# Patient Record
Sex: FEMALE | Race: WHITE | NOT HISPANIC OR LATINO | URBAN - METROPOLITAN AREA
[De-identification: names, ages, dates, MRNs, and addresses within clinical notes are randomized per-mention and may not be internally consistent; named-entity substitution may affect disease eponyms.]

---

## 2021-04-13 ENCOUNTER — INPATIENT (INPATIENT)
Facility: HOSPITAL | Age: 81
LOS: 7 days | Discharge: HOME HEALTH | End: 2021-04-21
Attending: INTERNAL MEDICINE | Admitting: INTERNAL MEDICINE
Payer: MEDICARE

## 2021-04-13 VITALS
OXYGEN SATURATION: 99 % | SYSTOLIC BLOOD PRESSURE: 160 MMHG | DIASTOLIC BLOOD PRESSURE: 64 MMHG | RESPIRATION RATE: 18 BRPM | TEMPERATURE: 98 F | HEART RATE: 100 BPM

## 2021-04-13 DIAGNOSIS — I26.99 OTHER PULMONARY EMBOLISM WITHOUT ACUTE COR PULMONALE: ICD-10-CM

## 2021-04-13 LAB
ALBUMIN SERPL ELPH-MCNC: 3.4 G/DL — SIGNIFICANT CHANGE UP (ref 3.3–5)
ALP SERPL-CCNC: 42 U/L — SIGNIFICANT CHANGE UP (ref 40–120)
ALT FLD-CCNC: 19 U/L — SIGNIFICANT CHANGE UP (ref 4–33)
ANION GAP SERPL CALC-SCNC: 12 MMOL/L — SIGNIFICANT CHANGE UP (ref 7–14)
APTT BLD: 29.5 SEC — SIGNIFICANT CHANGE UP (ref 27–36.3)
AST SERPL-CCNC: 33 U/L — HIGH (ref 4–32)
BASOPHILS # BLD AUTO: 0.04 K/UL — SIGNIFICANT CHANGE UP (ref 0–0.2)
BASOPHILS NFR BLD AUTO: 0.7 % — SIGNIFICANT CHANGE UP (ref 0–2)
BILIRUB SERPL-MCNC: 0.8 MG/DL — SIGNIFICANT CHANGE UP (ref 0.2–1.2)
BUN SERPL-MCNC: 22 MG/DL — SIGNIFICANT CHANGE UP (ref 7–23)
CALCIUM SERPL-MCNC: 9.4 MG/DL — SIGNIFICANT CHANGE UP (ref 8.4–10.5)
CHLORIDE SERPL-SCNC: 102 MMOL/L — SIGNIFICANT CHANGE UP (ref 98–107)
CO2 SERPL-SCNC: 24 MMOL/L — SIGNIFICANT CHANGE UP (ref 22–31)
CREAT SERPL-MCNC: 0.86 MG/DL — SIGNIFICANT CHANGE UP (ref 0.5–1.3)
EOSINOPHIL # BLD AUTO: 0.02 K/UL — SIGNIFICANT CHANGE UP (ref 0–0.5)
EOSINOPHIL NFR BLD AUTO: 0.3 % — SIGNIFICANT CHANGE UP (ref 0–6)
GLUCOSE SERPL-MCNC: 96 MG/DL — SIGNIFICANT CHANGE UP (ref 70–99)
HCT VFR BLD CALC: 37.3 % — SIGNIFICANT CHANGE UP (ref 34.5–45)
HGB BLD-MCNC: 12.8 G/DL — SIGNIFICANT CHANGE UP (ref 11.5–15.5)
IANC: 3.66 K/UL — SIGNIFICANT CHANGE UP (ref 1.5–8.5)
IMM GRANULOCYTES NFR BLD AUTO: 0.7 % — SIGNIFICANT CHANGE UP (ref 0–1.5)
INR BLD: 1.19 RATIO — HIGH (ref 0.88–1.16)
LYMPHOCYTES # BLD AUTO: 1.35 K/UL — SIGNIFICANT CHANGE UP (ref 1–3.3)
LYMPHOCYTES # BLD AUTO: 23.2 % — SIGNIFICANT CHANGE UP (ref 13–44)
MCHC RBC-ENTMCNC: 34.3 GM/DL — SIGNIFICANT CHANGE UP (ref 32–36)
MCHC RBC-ENTMCNC: 36.1 PG — HIGH (ref 27–34)
MCV RBC AUTO: 105.1 FL — HIGH (ref 80–100)
MONOCYTES # BLD AUTO: 0.71 K/UL — SIGNIFICANT CHANGE UP (ref 0–0.9)
MONOCYTES NFR BLD AUTO: 12.2 % — SIGNIFICANT CHANGE UP (ref 2–14)
NEUTROPHILS # BLD AUTO: 3.66 K/UL — SIGNIFICANT CHANGE UP (ref 1.8–7.4)
NEUTROPHILS NFR BLD AUTO: 62.9 % — SIGNIFICANT CHANGE UP (ref 43–77)
NRBC # BLD: 0 /100 WBCS — SIGNIFICANT CHANGE UP
NRBC # FLD: 0 K/UL — SIGNIFICANT CHANGE UP
NT-PROBNP SERPL-SCNC: 246 PG/ML — SIGNIFICANT CHANGE UP
PLATELET # BLD AUTO: 182 K/UL — SIGNIFICANT CHANGE UP (ref 150–400)
POTASSIUM SERPL-MCNC: 4 MMOL/L — SIGNIFICANT CHANGE UP (ref 3.5–5.3)
POTASSIUM SERPL-SCNC: 4 MMOL/L — SIGNIFICANT CHANGE UP (ref 3.5–5.3)
PROT SERPL-MCNC: 6.7 G/DL — SIGNIFICANT CHANGE UP (ref 6–8.3)
PROTHROM AB SERPL-ACNC: 13.5 SEC — SIGNIFICANT CHANGE UP (ref 10.6–13.6)
RBC # BLD: 3.55 M/UL — LOW (ref 3.8–5.2)
RBC # FLD: 14.6 % — HIGH (ref 10.3–14.5)
SARS-COV-2 RNA SPEC QL NAA+PROBE: SIGNIFICANT CHANGE UP
SODIUM SERPL-SCNC: 138 MMOL/L — SIGNIFICANT CHANGE UP (ref 135–145)
TROPONIN T, HIGH SENSITIVITY RESULT: 6 NG/L — SIGNIFICANT CHANGE UP
TROPONIN T, HIGH SENSITIVITY RESULT: 7 NG/L — SIGNIFICANT CHANGE UP
WBC # BLD: 5.82 K/UL — SIGNIFICANT CHANGE UP (ref 3.8–10.5)
WBC # FLD AUTO: 5.82 K/UL — SIGNIFICANT CHANGE UP (ref 3.8–10.5)

## 2021-04-13 PROCEDURE — 93010 ELECTROCARDIOGRAM REPORT: CPT

## 2021-04-13 PROCEDURE — 71045 X-RAY EXAM CHEST 1 VIEW: CPT | Mod: 26

## 2021-04-13 PROCEDURE — 71275 CT ANGIOGRAPHY CHEST: CPT | Mod: 26

## 2021-04-13 PROCEDURE — 99285 EMERGENCY DEPT VISIT HI MDM: CPT | Mod: CS,25,GC

## 2021-04-13 PROCEDURE — 93308 TTE F-UP OR LMTD: CPT | Mod: 26

## 2021-04-13 PROCEDURE — 74177 CT ABD & PELVIS W/CONTRAST: CPT | Mod: 26

## 2021-04-13 RX ORDER — HEPARIN SODIUM 5000 [USP'U]/ML
5500 INJECTION INTRAVENOUS; SUBCUTANEOUS EVERY 6 HOURS
Refills: 0 | Status: DISCONTINUED | OUTPATIENT
Start: 2021-04-13 | End: 2021-04-16

## 2021-04-13 RX ORDER — HEPARIN SODIUM 5000 [USP'U]/ML
2500 INJECTION INTRAVENOUS; SUBCUTANEOUS EVERY 6 HOURS
Refills: 0 | Status: DISCONTINUED | OUTPATIENT
Start: 2021-04-13 | End: 2021-04-16

## 2021-04-13 RX ORDER — HEPARIN SODIUM 5000 [USP'U]/ML
5500 INJECTION INTRAVENOUS; SUBCUTANEOUS ONCE
Refills: 0 | Status: COMPLETED | OUTPATIENT
Start: 2021-04-13 | End: 2021-04-13

## 2021-04-13 RX ORDER — NITROGLYCERIN 6.5 MG
0.4 CAPSULE, EXTENDED RELEASE ORAL ONCE
Refills: 0 | Status: COMPLETED | OUTPATIENT
Start: 2021-04-13 | End: 2021-04-13

## 2021-04-13 RX ORDER — ASPIRIN/CALCIUM CARB/MAGNESIUM 324 MG
162 TABLET ORAL ONCE
Refills: 0 | Status: COMPLETED | OUTPATIENT
Start: 2021-04-13 | End: 2021-04-13

## 2021-04-13 RX ORDER — HEPARIN SODIUM 5000 [USP'U]/ML
INJECTION INTRAVENOUS; SUBCUTANEOUS
Qty: 25000 | Refills: 0 | Status: DISCONTINUED | OUTPATIENT
Start: 2021-04-13 | End: 2021-04-14

## 2021-04-13 RX ORDER — ACETAMINOPHEN 500 MG
975 TABLET ORAL ONCE
Refills: 0 | Status: COMPLETED | OUTPATIENT
Start: 2021-04-13 | End: 2021-04-13

## 2021-04-13 RX ADMIN — Medication 162 MILLIGRAM(S): at 13:26

## 2021-04-13 RX ADMIN — HEPARIN SODIUM 5000 UNIT(S): 5000 INJECTION INTRAVENOUS; SUBCUTANEOUS at 17:17

## 2021-04-13 RX ADMIN — Medication 975 MILLIGRAM(S): at 18:03

## 2021-04-13 RX ADMIN — Medication 975 MILLIGRAM(S): at 02:10

## 2021-04-13 RX ADMIN — HEPARIN SODIUM 1200 UNIT(S)/HR: 5000 INJECTION INTRAVENOUS; SUBCUTANEOUS at 17:19

## 2021-04-13 NOTE — ED ADULT NURSE NOTE - OBJECTIVE STATEMENT
Pt rec'd in 3, A&Ox4, sent from [facility] for eval of non-radiating, non-pleuritic chest pain and SOB since this morning. Reports mild nausea in the past 2 days, denies vomiting. Denies any other symptoms.

## 2021-04-13 NOTE — ED PROVIDER NOTE - PHYSICAL EXAMINATION
General: NAD, good hygiene, well developed  HENT: Atraumatic, EOMI, no conjunctivae injection, moist mucosa.  Neck: normal ROM and trachea midline   Cardiovascular: RRR, S1&2, no M or R, radial pulses equal and b/l  Respiratory: CTABL, no wheezes or crackles, no decreased breath sounds  Abdominal: soft and non-tender non distended, neg for guarding, no CVA tenderness   Extremities: no edema of the legs/feet, DP/PT equal b/l  Skin: warm, well perfused  Neurologic: nonfocal, AAOx3  Psych: normal mood and affect General: NAD, good hygiene, well developed  HENT: Atraumatic, EOMI, no conjunctivae injection, moist mucosa.  Neck: normal ROM and trachea midline   Cardiovascular: RRR, S1&2, no M or R, radial pulses equal and b/l  Respiratory: CTABL, no wheezes or crackles, no decreased breath sounds  Abdominal: soft and non-tender non distended, neg for guarding, no CVA tenderness   Extremities: no edema of the legs/feet, DP/PT equal b/l  Skin: warm, well perfused  Neurologic: nonfocal, AAOx3  Psych: normal mood and affect    chest wall no crepitus

## 2021-04-13 NOTE — ED PROVIDER NOTE - PROGRESS NOTE DETAILS
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 denies any brain masses. Pt remains in no resp distress. started heparin. CT also shows possible R renal infarct, pt denying any R flank pain or urinary symptoms.

## 2021-04-13 NOTE — ED PROVIDER NOTE - OBJECTIVE STATEMENT
h.o HTN, non smoker, p.w acute chest pain w. exertional shortness of breath today. chest pain sharp and non radiating.  patient was seen in the urgent care today and have EKG showing TWI in the lateral and anterior leads. patient reported recent travel to [place] in car but no LE edema  or hx blood clots. h.o HTN, ANCA vasculitis, non smoker, p.w acute chest pain w. exertional shortness of breath today. chest pain sharp and non radiating, not associated with n/v and improved when laying down, patient was seen in the urgent care today and have EKG showing TWI in the lateral and anterior leads. patient reported recent travel to [place] in car but no LE edema or hx blood clots.  pcp: Dr. Mejia ([facility]) h.o HTN, ANCA vasculitis, non smoker, p.w acute chest pain w. exertional shortness of breath today. chest pain sharp and non radiating, not associated with n/v and improved when laying down, patient was seen in the urgent care today and have EKG showing TWI in the lateral and anterior leads. patient reported recent travel to [place] in car but no LE edema or hx blood clots.  pcp: Dr. Mejia ([facility])  Home meds: Azathioprine

## 2021-04-13 NOTE — ED PROVIDER NOTE - NS ED ROS FT
GENERAL: No fever or chills, weight changes, nightsweats  EYES: no change in vision  HEENT: no dysplasia, odynophagia, ear pain, rhinorrhea, epistasis   CARDIAC: no chest pain, palpitation   PULMONARY: no productive cough or SOB  GI: no abdominal pain, no nausea or no vomiting, no diarrhea or constipation  : No changes in urination for pain/freq.   SKIN: no rashes, abnormal bruising or bleeding  NEURO: no headache, numbness/tingling, extremity weakness   MSK: No joint pain GENERAL: No fever or chills, weight changes, nightsweats  EYES: no change in vision  HEENT: no dysplasia, odynophagia, ear pain, rhinorrhea, epistasis   CARDIAC: no palpitation   PULMONARY: no productive cough or SOB  GI: no abdominal pain, no nausea or no vomiting, no diarrhea or constipation  : No changes in urination for pain/freq.   SKIN: no rashes, abnormal bruising or bleeding  NEURO: no headache, numbness/tingling, extremity weakness   MSK: No joint pain GENERAL: No fever or chills, weight changes, nightsweats  EYES: no change in vision  HEENT: no dysplasia, odynophagia, ear pain, rhinorrhea, epistasis   CARDIAC: no palpitation  + shart left sided chest pain  PULMONARY: no productive cough or SOB, + mild cough nonproductive  GI: no abdominal pain, no nausea or no vomiting, no diarrhea or constipation  : No changes in urination for pain/freq.   SKIN: no rashes, abnormal bruising or bleeding  NEURO: no headache, numbness/tingling, extremity weakness   MSK: No joint pain

## 2021-04-13 NOTE — ED PROVIDER NOTE - CLINICAL SUMMARY MEDICAL DECISION MAKING FREE TEXT BOX
chest pain w. exertional shortness of breath today and + TWI in the lateral leads, recent travel to [place], concerning for ACS vs PE vs pleural effusion no fever and non tachycardiac, will give asa. physical exam unremarkable, and will get basic labs for anemia, leukocytosis and electrolyte derangement eval. ekg in ED showed no STEMI at this time. will get trop and CTA. chest pain w. exertional shortness of breath today and + TWI in the lateral leads, recent travel to [place], concerning for ACS vs PE vs pleural effusion, no fever and non tachycardiac, will give asa and nitro prn, physical exam unremarkable, and will get basic labs for anemia, leukocytosis and electrolyte derangement eval. ekg in ED showed no STEMI at this time. will get trop and CTA due to prior hx of travel and vasculitis. due to age and exertional shortness of breath and no recent stress test, will admit for further cardiac evaluation.

## 2021-04-13 NOTE — ED ADULT NURSE REASSESSMENT NOTE - NS ED NURSE REASSESS COMMENT FT1
report received at shift change, pt return from CT, remains AAOx3, VS as noted, pt in NAD, awaiting bed assignment, heparin gtt infusing as started by previous RN per MD order, will continue to monitor pt.

## 2021-04-13 NOTE — ED PROVIDER NOTE - CARE PLAN
Principal Discharge DX:	Pulmonary embolism  Secondary Diagnosis:	ANCA-positive vasculitis  Secondary Diagnosis:	Lung mass

## 2021-04-13 NOTE — ED ADULT NURSE NOTE - NSFALLRSKOUTCOME_ED_ALL_ED
Go for blood tests as directed. Your doctor will do lab tests at regular visits to monitor the effects of this medicine. Please follow up with your doctor and keep your health care provider appointments. Universal Safety Interventions

## 2021-04-13 NOTE — ED PROVIDER NOTE - ATTENDING CONTRIBUTION TO CARE
I performed a face to face evaluation of this patient and performed a full history and physical examination on the patient.  I agree with the resident's history, physical examination, and plan of the patient.  Pt with atypical chest pain, sharp, worse at times with movement and laying down, h/o ANCA vasculitis, no interventions needed, recent travel.    Consider PE, possible musculoskeletal, less likely cardiac, labs, xray, monitor, and consider admission and further testing.  Exam with  heart wnl, lungs cta, abd soft nontender, neuro wnl.  Also consider infection/PNA/Covid as pt with mild nonproductive cough.

## 2021-04-14 DIAGNOSIS — I26.99 OTHER PULMONARY EMBOLISM WITHOUT ACUTE COR PULMONALE: ICD-10-CM

## 2021-04-14 DIAGNOSIS — R09.89 OTHER SPECIFIED SYMPTOMS AND SIGNS INVOLVING THE CIRCULATORY AND RESPIRATORY SYSTEMS: ICD-10-CM

## 2021-04-14 LAB
APTT BLD: 102.5 SEC — HIGH (ref 27–36.3)
APTT BLD: 159.9 SEC — CRITICAL HIGH (ref 27–36.3)
APTT BLD: 90.6 SEC — HIGH (ref 27–36.3)
HCT VFR BLD CALC: 40.7 % — SIGNIFICANT CHANGE UP (ref 34.5–45)
HGB BLD-MCNC: 14 G/DL — SIGNIFICANT CHANGE UP (ref 11.5–15.5)
MCHC RBC-ENTMCNC: 34.4 GM/DL — SIGNIFICANT CHANGE UP (ref 32–36)
MCHC RBC-ENTMCNC: 35.7 PG — HIGH (ref 27–34)
MCV RBC AUTO: 103.8 FL — HIGH (ref 80–100)
NRBC # BLD: 0 /100 WBCS — SIGNIFICANT CHANGE UP
NRBC # FLD: 0 K/UL — SIGNIFICANT CHANGE UP
PLATELET # BLD AUTO: 177 K/UL — SIGNIFICANT CHANGE UP (ref 150–400)
PROCALCITONIN SERPL-MCNC: 0.07 NG/ML — SIGNIFICANT CHANGE UP (ref 0.02–0.1)
RBC # BLD: 3.92 M/UL — SIGNIFICANT CHANGE UP (ref 3.8–5.2)
RBC # FLD: 14.6 % — HIGH (ref 10.3–14.5)
WBC # BLD: 5.74 K/UL — SIGNIFICANT CHANGE UP (ref 3.8–10.5)
WBC # FLD AUTO: 5.74 K/UL — SIGNIFICANT CHANGE UP (ref 3.8–10.5)

## 2021-04-14 PROCEDURE — 76770 US EXAM ABDO BACK WALL COMP: CPT | Mod: 26

## 2021-04-14 PROCEDURE — 93970 EXTREMITY STUDY: CPT | Mod: 26

## 2021-04-14 RX ORDER — AZATHIOPRINE 100 MG/1
150 TABLET ORAL DAILY
Refills: 0 | Status: DISCONTINUED | OUTPATIENT
Start: 2021-04-14 | End: 2021-04-17

## 2021-04-14 RX ORDER — CITALOPRAM 10 MG/1
10 TABLET, FILM COATED ORAL DAILY
Refills: 0 | Status: DISCONTINUED | OUTPATIENT
Start: 2021-04-14 | End: 2021-04-21

## 2021-04-14 RX ORDER — HEPARIN SODIUM 5000 [USP'U]/ML
1000 INJECTION INTRAVENOUS; SUBCUTANEOUS
Qty: 25000 | Refills: 0 | Status: DISCONTINUED | OUTPATIENT
Start: 2021-04-14 | End: 2021-04-14

## 2021-04-14 RX ORDER — SIMVASTATIN 20 MG/1
10 TABLET, FILM COATED ORAL AT BEDTIME
Refills: 0 | Status: DISCONTINUED | OUTPATIENT
Start: 2021-04-14 | End: 2021-04-21

## 2021-04-14 RX ORDER — HEPARIN SODIUM 5000 [USP'U]/ML
900 INJECTION INTRAVENOUS; SUBCUTANEOUS
Qty: 25000 | Refills: 0 | Status: DISCONTINUED | OUTPATIENT
Start: 2021-04-14 | End: 2021-04-16

## 2021-04-14 RX ADMIN — AZATHIOPRINE 150 MILLIGRAM(S): 100 TABLET ORAL at 12:43

## 2021-04-14 RX ADMIN — CITALOPRAM 10 MILLIGRAM(S): 10 TABLET, FILM COATED ORAL at 12:43

## 2021-04-14 RX ADMIN — HEPARIN SODIUM 0 UNIT(S)/HR: 5000 INJECTION INTRAVENOUS; SUBCUTANEOUS at 00:16

## 2021-04-14 RX ADMIN — HEPARIN SODIUM 1000 UNIT(S)/HR: 5000 INJECTION INTRAVENOUS; SUBCUTANEOUS at 01:52

## 2021-04-14 RX ADMIN — HEPARIN SODIUM 900 UNIT(S)/HR: 5000 INJECTION INTRAVENOUS; SUBCUTANEOUS at 08:43

## 2021-04-14 RX ADMIN — SIMVASTATIN 10 MILLIGRAM(S): 20 TABLET, FILM COATED ORAL at 22:10

## 2021-04-14 RX ADMIN — HEPARIN SODIUM 900 UNIT(S)/HR: 5000 INJECTION INTRAVENOUS; SUBCUTANEOUS at 17:51

## 2021-04-14 RX ADMIN — HEPARIN SODIUM 900 UNIT(S)/HR: 5000 INJECTION INTRAVENOUS; SUBCUTANEOUS at 22:07

## 2021-04-14 NOTE — H&P ADULT - ATTENDING COMMENTS
Pt seen and examined with the NP. Agree with the assessment and plan.  Vitals, labs and radiology results personally reviewed.  Above note edited as appropriate.  Discussed with the pt and answered all questions.    Clinically feels well. currently no cp, no sob, no n/v/d. no abdominal pain. no headache, no dizziness. comfortable on room air O2.     80 year old female with PMHx of HTN, ANCA vasculitis (dx 20 years ago, been on Azathioprine since then, being followed by Rheum outpt), non smoker, presents with acute 9/10 stabbing, non-radiating chest pain lasting 10-15 mins. with exertional shortness of breath. EKG showing TWI in the lateral and anterior leads. Patient reported recent travel to [place] in car (~10 hrs ride).     # Acute pulmonary embolus, first episode. Prolonged car ride and recent sedentary life style during pandemic. no family hx. CTa chest reviewed. large pulm masses b/l noted: infectious vs. inflammatory. pulm Consulted. Will tentatively plan for biopsy. ID eval per pulm. check TTE, LE dopplers.   # hx ANCA vasculitis: c/w Hep gtt, pending biopsy  Physical therapy. Out of bed to chair with assistance. monitor O2 status.     [facility]  [phone] Pt seen and examined with the NP. Agree with the assessment and plan.  Vitals, labs and radiology results personally reviewed.  Above note edited as appropriate.  Discussed with the pt and answered all questions.    Clinically feels well. currently no cp, no sob, no n/v/d. no abdominal pain. no headache, no dizziness. comfortable on room air O2.     80 year old female with PMHx of HTN, ANCA vasculitis (dx 20 years ago, been on Azathioprine since then, being followed by Rheum outpt), non smoker, presents with acute 9/10 stabbing, non-radiating chest pain lasting 10-15 mins. with exertional shortness of breath. EKG showing TWI in the lateral and anterior leads. Patient reported recent travel to [place] in car (~10 hrs ride).     # Acute pulmonary embolus, first episode. Prolonged car ride and recent sedentary life style during pandemic. no family hx. CTa chest reviewed. large pulm masses b/l noted: infectious vs. inflammatory. pulm Consulted. Will tentatively plan for biopsy. ID eval per pulm. check TTE, LE dopplers.   # hx ANCA vasculitis: c/w Hep gtt, pending biopsy  # upper pole of right kidney: complex cyst? outpt MRI. also biopsy of lung mass as above.  # HTN  Physical therapy. Out of bed to chair with assistance. monitor O2 status.     [facility]  [phone]

## 2021-04-14 NOTE — CONSULT NOTE ADULT - ASSESSMENT
80 year old female with PMHx of HTN, ANCA vasculitis, Former smoker, presents with acute 9/10 stabbing, non-radiating chest pain with exertional shortness of breath with twi on ecg;  CTa revealed acute pe     1. Atypical chest pain   -in the setting of acute PE   -Ecg with twi noted to anterolateral leads  -HS trop neg  -check echo   -continue a.c for PE     2. Acute PE   -CTa chest with RLL, RML PE  -Cta also revealed  Bilateral masslike and nodular consolidations measuring up to 8.6 cm, Mediastinal lymphadenopathy.2 x 3 cm right lower lobe paraspinal chronic cyst, right kidney ? mass or cyst  -pulm following , med f/u   -Check echo, dopplers, c/w hep gtt    3. Lung Mass, Mediastinal lymphadenopathy  -renal US noted ? mass vs cyst   -work up per pulm. med     4. ANCA Vasculitis  -med , id f/u

## 2021-04-14 NOTE — H&P ADULT - NSHPSOCIALHISTORY_GEN_ALL_CORE
patient lives home alone, thinking of moving to something smaller because she cannot tolerate the stairs as well as she used to  denies tobacco/alcohol

## 2021-04-14 NOTE — CONSULT NOTE ADULT - ASSESSMENT
Patient is a 80 year old female with PMHx of HTN, ANCA vasculitis, non smoker, presents with acute 9/10 stabbing, non-radiating chest pain with exertional shortness of breath. Not associated with n/v and improved when laying down, patient was seen in the urgent care and had EKG showing TWI in the lateral and anterior leads. Patient reported recent travel to [place] in car but no LE edema or hx blood clots.    PE:  Lung masses  R renal infarct: CT showing possible R renal infarct, No complaints of R flank pain or urinary symptoms, Renal US IMPRESSION: Hypoechoic lesion in the upper pole of right kidney which corresponds to the cystic lesion identified on recent CT. This may represent a complex cyst or a solid renal mass and should be further characterized with a contrast-enhanced MRI on outpatient basis.  HTN:  ANCA Vasculitis:    4/14/2021:      PE: on heparin: get dopplers and echo   ANCA Vasculitis: on azathioprine: 150 mg per day : LFTs are normal   Lung masses: DW LORRAINE: She has large masses on both sides and small nodule: RUL and LLL: may be exacerbation of vasculitis ?: core biopsy vs vats biopsy : get RHEUM involved and ID involved: ? fungal infection can happen: id consult: she does not look sick   No enlarged axillary lymph nodes. A few enlarged mediastinal lymph nodes with the largest in a precarinal location measuring about 1.3 cm.: demi watkins: small : follow up   R renal infarct: CT showing possible R renal infarct, No complaints of R flank pain or urinary symptoms, Renal US IMPRESSION: Hypoechoic lesion in the upper pole of right kidney which corresponds to the cystic lesion identified on recent CT. This may represent a complex cyst or a solid renal mass and should be further characterized with a contrast-enhanced MRI on outpatient basis.  HTN: controlled  DW PMD

## 2021-04-14 NOTE — CONSULT NOTE ADULT - ASSESSMENT
Pt is a 80W w/ PMHx of HTN, ANCA vasculitis, p/w with acute 9/10 stabbing, non-radiating chest pain with exertional shortness of breath. Not associated with n/v and improved when laying down, patient was seen in the urgent care and had EKG showing TWI in the lateral and anterior leads. Patient reported recent travel to [place] in car but no LE edema or hx blood clots.    B/l lung masses concerning for infection vs inflammation vs malignancy  CT imaging showing Bilateral masslike and nodular consolidations measuring up to 8.6 cm as described above. Differential diagnosis include infectious/inflammatory or neoplastic etiologies and mediastinal lymphadenopathy.  Agree w/ plan for Bx  --please send for bacterial/fungal/AFB cx in additional to pathology/cytology    **THIS IS NOT A COMPLETE NOTE, FULL NOTE TO FOLLOW**    Acute PE  Chest Pain  CTA chest w/ Right lower and middle lobe pulmonary arterial emboli also w/ lung nodules  AC and additional management per primary team    ANCA Vasculitis  on Azathioprine 150MG daily  No hx of biologic use    Infectious Diseases will continue to follow. Please call with any questions.   Deonna Mclean M.D.  [facility], Division of Infectious Diseases [phone]  For over the weekend and after hours, please call [phone]     Pt is a 80W w/ PMHx of HTN, ANCA vasculitis, p/w with acute 9/10 stabbing, non-radiating chest pain with exertional shortness of breath. Not associated with n/v and improved when laying down, patient was seen in the urgent care and had EKG showing TWI in the lateral and anterior leads. Patient reported recent travel to [place] in car but no LE edema or hx blood clots.    B/l lung masses concerning for infection vs inflammation vs malignancy  CT imaging showing Bilateral masslike and nodular consolidations measuring up to 8.6 cm as described above. Differential diagnosis include infectious/inflammatory or neoplastic etiologies and mediastinal lymphadenopathy.  Would consider bronch or bx for further evaluation--Please send for bacterial/fungal/AFB cx in addition to pathology/cytology  Ordering fungal markers w/ AM labs  Low suspicion for PNA  However if pt w/ clinical worsening, possible post obstructive PNA; low threshold to start zosyn.    Acute PE  Chest Pain  CTA chest w/ Right lower and middle lobe pulmonary arterial emboli also w/ lung nodules  AC and additional management per primary team    ANCA Vasculitis  on Azathioprine 150MG daily  No hx of biologic use    Infectious Diseases will continue to follow. Please call with any questions.   Deonna Mclean M.D.  [facility], Division of Infectious Diseases [phone]  For over the weekend and after hours, please call [phone]

## 2021-04-14 NOTE — H&P ADULT - NSHPLABSRESULTS_GEN_ALL_CORE
LABS:                        14.0   5.74  )-----------( 177      ( 14 Apr 2021 07:35 )             40.7     04-13    138  |  102  |  22  ----------------------------<  96  4.0   |  24  |  0.86    Ca    9.4      13 Apr 2021 13:36    TPro  6.7  /  Alb  3.4  /  TBili  0.8  /  DBili  x   /  AST  33<H>  /  ALT  19  /  AlkPhos  42  04-13    PT/INR - ( 13 Apr 2021 13:36 )   PT: 13.5 sec;   INR: 1.19 ratio         PTT - ( 14 Apr 2021 07:35 )  PTT:102.5 sec  CAPILLARY BLOOD GLUCOSE        RADIOLOGY & ADDITIONAL TESTS:    < from: US TTE 2D F/U, Limited w/o Contrast (ED) (04.13.21 @ 14:55) >    IMPRESSION:  No Pericardial Effusion.  No RV strain. No RV dilation.  Mild LVH.  Preserved LV function.    < end of copied text >    < from: Xray Chest 1 View- PORTABLE-Urgent (04.13.21 @ 15:24) >    IMPRESSION:    Peripheral left upper lobe opacity may represent pneumonia versus mass, clinically correlate and short-term follow-up to resolution.    < end of copied text >    < from: CT Angio Chest w/ IV Cont (04.13.21 @ 15:53) >    MPRESSION: Right lower and middle lobe pulmonary arterial emboli as described above.    Bilateral masslike and nodular consolidations measuring up to 8.6 cm as described above. Differential diagnosis include infectious/inflammatory or neoplastic etiologies. A 1 month follow-up noncontrast chest CT is recommended for complete evaluation.    Mediastinal lymphadenopathy.    2 x 3 cm right lower lobe paraspinal chronic cyst with mild peripheral wall thickening can be monitored on the follow-up chest CT.    Rounded area of decreased enhancement involving the upper pole of the partially imaged right kidney. Differential diagnosis include renal infarct or renal neoplasm. Dedicated contrast-enhanced abdominal CT is recommended for complete evaluation.    Urgent findings were discussed with Dr. Orona on April 13, 2021 at 4:22 PM with read back.    < end of copied text >    < from: CT Abdomen and Pelvis w/ IV Cont (04.13.21 @ 20:38) >    IMPRESSION:      1. Indeterminate 3.1 cm rounded hypodense lesion in the upper pole of the right kidney with suspected adjacent mild stranding. Differential diagnosis includes renal mass, complex cyst, focal pyelonephritis or infarct. Correlation with contrast-enhanced MRI is recommended. Correlation with urinalysis is also recommended.    2. Lower chest findings better evaluated on same date chest CT.    3. Indeterminate 1.4 similar left adrenalnodule. Follow-up MRI is recommended for characterization.    < end of copied text >    < from: US Kidney and Bladder (04.14.21 @ 09:23) >    IMPRESSION:    Hypoechoic lesion in the upper pole of right kidney which corresponds to the cystic lesion identified on recent CT. This may represent a complex cyst or a solid renal mass and should be further characterized with a contrast-enhanced MRI on outpatient basis.    < end of copied text >        Imaging Personally Reviewed:  [x] YES  [ ] NO    Consultant(s) Notes Reviewed:  [x] YES  [ ] NO    Care Discussed with Consultants/Other Providers [x] YES  [ ] NO

## 2021-04-14 NOTE — H&P ADULT - ASSESSMENT
Patient is a 80 year old female with PMHx of HTN, ANCA vasculitis, non smoker, presents with acute 9/10 stabbing, non-radiating chest pain with exertional shortness of breath. Not associated with n/v and improved when laying down, patient was seen in the urgent care and had EKG showing TWI in the lateral and anterior leads. Patient reported recent travel to [place] in car but no LE edema or hx blood clots.    # Chest Pain:  Trops negative  Monitor on tele  Follow up Echo  Follow up cards consult    # PE:  CTA Chest IMPRESSION: Right lower and middle lobe pulmonary arterial emboli   Cont Heparin Gtt  COVID negative   Follow up Pulm cont    # R renal infarct:  CT showing possible R renal infarct  No complaints of R flank pain or urinary symptoms  Renal US IMPRESSION: Hypoechoic lesion in the upper pole of right kidney which corresponds to the cystic lesion identified on recent CT. This may represent a complex cyst or a solid renal mass and should be further characterized with a contrast-enhanced MRI on outpatient basis.    # HTN:  Chronic, stable    # ANCA Vasculitis:  Chronic, stable    # DVT ppx:  On Hep gtt Patient is a 80 year old female with PMHx of HTN, ANCA vasculitis, non smoker, presents with acute 9/10 stabbing, non-radiating chest pain with exertional shortness of breath. Not associated with n/v and improved when laying down, patient was seen in the urgent care and had EKG showing TWI in the lateral and anterior leads. Patient reported recent travel to [place] in car but no LE edema or hx blood clots.    # Chest Pain:  Trops negative  Monitor on tele  Follow up Echo  Follow up cards consult    # PE:  CTA Chest IMPRESSION: Right lower and middle lobe pulmonary arterial emboli also w/ lung nodules  Cont Heparin Gtt  COVID negative   Follow up Pulm cont    # R renal infarct:  CT showing possible R renal infarct  No complaints of R flank pain or urinary symptoms  Renal US IMPRESSION: Hypoechoic lesion in the upper pole of right kidney which corresponds to the cystic lesion identified on recent CT. This may represent a complex cyst or a solid renal mass and should be further characterized with a contrast-enhanced MRI on outpatient basis.    # HTN:  Chronic, stable    # ANCA Vasculitis:  Chronic, stable    # DVT ppx:  On Hep gtt Patient is a 80 year old female with PMHx of HTN, ANCA vasculitis, non smoker, presents with acute 9/10 stabbing, non-radiating chest pain with exertional shortness of breath. Not associated with n/v and improved when laying down, patient was seen in the urgent care and had EKG showing TWI in the lateral and anterior leads. Patient reported recent travel to [place] in car but no LE edema or hx blood clots.    # Chest Pain:  Trops negative  Monitor on tele  Follow up Echo  Follow up cards consult    # PE:  CTA Chest IMPRESSION: Right lower and middle lobe pulmonary arterial emboli also w/ lung nodules  Cont Heparin Gtt  COVID negative   Follow up Pulm cont    # R renal infarct:  CT showing possible R renal infarct  No complaints of R flank pain or urinary symptoms  Renal US IMPRESSION: Hypoechoic lesion in the upper pole of right kidney which corresponds to the cystic lesion identified on recent CT. This may represent a complex cyst or a solid renal mass and should be further characterized with a contrast-enhanced MRI on outpatient basis.    # HTN:  Chronic, stable    # ANCA Vasculitis:  Chronic, stable  Cont home medication regime Azathioprine 150MG daily    # DVT ppx:  On Hep gtt

## 2021-04-14 NOTE — H&P ADULT - NSHPPHYSICALEXAM_GEN_ALL_CORE
Vital Signs Last 24 Hrs  T(C): 36.6 (14 Apr 2021 10:00), Max: 36.9 (13 Apr 2021 12:11)  T(F): 97.8 (14 Apr 2021 10:00), Max: 98.4 (13 Apr 2021 12:11)  HR: 88 (14 Apr 2021 10:00) (66 - 100)  BP: 153/62 (14 Apr 2021 10:00) (147/76 - 182/73)  BP(mean): 104 (13 Apr 2021 17:20) (104 - 104)  RR: 16 (14 Apr 2021 10:00) (16 - 19)  SpO2: 95% (14 Apr 2021 10:00) (95% - 100%)    PHYSICAL EXAM:  GENERAL: NAD, well-developed, comfortable  HEAD:  Atraumatic, Normocephalic  EYES: EOMI, PERRLA, conjunctiva and sclera clear  NECK: Supple, No JVD  CHEST/LUNG: Clear to auscultation bilaterally; No wheeze  HEART: Regular rate and rhythm; No murmurs, rubs, or gallops  ABDOMEN: Soft, Nontender, Nondistended; Bowel sounds present  NEURO: AAOx3, no focal weakness, 5/5 b/l extremity strength, b/l knee no arthritis, no effusion   EXTREMITIES:  2+ Peripheral Pulses, No clubbing, cyanosis, or edema  SKIN: No rashes or lesions

## 2021-04-14 NOTE — H&P ADULT - NSHPREVIEWOFSYSTEMS_GEN_ALL_CORE
GENERAL: no weakness, no fever/chills, no weight loss/gain  EYES/ENT: No visual changes, no vertigo or throat pain  NECK: No pain or stiffness   RESPIRATORY: + cough, no wheezing, no hemoptysis, no dyspnea, no shortness of breath  CARDIOVASCULAR: + chest pain or palpitations  GASTROINTESTINAL: no n/v/d, no abdominal or epigastric pain  GENITOURINARY: no dysuria, no frequency, no nocturia, no hematuria  MUSCULOSKELETAL: no trauma, no sprain/strain, no myalgias, no arthralgias, no fracture  NEUROLOGICAL: no HA, no dizziness, no weakness, no numbness  SKIN: No itching, rashes

## 2021-04-14 NOTE — CONSULT NOTE ADULT - SUBJECTIVE AND OBJECTIVE BOX
[facility], Division of Infectious Diseases  ANDREW Rodríguez, SAMMIE Jarquin  [phone]    BETTIE PRATER  80y, Female  [id number]    HPI--  HPI:  Patient is a 80 year old female with PMHx of HTN, ANCA vasculitis, non smoker, presents with acute 9/10 stabbing, non-radiating chest pain with exertional shortness of breath. Not associated with n/v and improved when laying down, patient was seen in the urgent care and had EKG showing TWI in the lateral and anterior leads. Patient reported recent travel to [place] in car but no LE edema or hx blood clots. (14 Apr 2021 10:47)    ID c/s for further evaluation of lung masses.       Active Medications--  azaTHIOprine 150 milliGRAM(s) Oral daily  citalopram 10 milliGRAM(s) Oral daily  heparin   Injectable 5500 Unit(s) IV Push every 6 hours PRN  heparin   Injectable 2500 Unit(s) IV Push every 6 hours PRN  heparin  Infusion. 1000 Unit(s)/Hr IV Continuous <Continuous>  simvastatin 10 milliGRAM(s) Oral at bedtime    Antimicrobials:     Immunologic: azaTHIOprine 150 milliGRAM(s) Oral daily      ROS:  CONSTITUTIONAL: No fevers or chills. No weakness or headache. No weight changes.  EYES/ENT: No visual or hearing changes. No sore throat or throat pain .  NECK: No pain or stiffness  RESPIRATORY: No cough, wheezing, or hemoptysis. No shortness of breath  CARDIOVASCULAR: No chest pain or palpitations  GASTROINTESTINAL: No abdominal pain. No nausea or vomiting. No diarrhea or constipation.  GENITOURINARY: No dysuria, frequency or hematuria  NEUROLOGICAL: No numbness or weakness  SKIN: No itching or rashes  PSYCHIATRIC: Pleasant. Appropriate affect    Allergies: No Known Allergies    PMH -- Vasculitis    ANCA-associated vasculitis      PSH -- No significant past surgical history      FH -- No pertinent family history in first degree relatives      Social History --  EtOH: denies   Tobacco: denies   Drug Use: denies     Travel/Environmental/Occupational History:    Physical Exam--  Vital Signs Last 24 Hrs  T(F): 97.8 (14 Apr 2021 10:00), Max: 98.3 (13 Apr 2021 17:20)  HR: 88 (14 Apr 2021 10:00) (66 - 89)  BP: 153/62 (14 Apr 2021 10:00) (147/76 - 182/73)  RR: 16 (14 Apr 2021 10:00) (16 - 19)  SpO2: 95% (14 Apr 2021 10:00) (95% - 100%)  General: nontoxic-appearing, no acute distress  HEENT: NC/AT, EOMI, anicteric, conjunctiva pink and moist, oropharynx clear, dentition fair  Neck: Not rigid. No sense of mass. No LAD  Lungs: Clear bilaterally without rales, wheezing or rhonchi  Heart: Regular rate and rhythm. No murmur, rub or gallop.  Abdomen: Soft. Nondistended. Nontender. Bowel sounds present. No organomegaly.  Back: No spinal tenderness. No costovertebral angle tenderness.  Extremities: No cyanosis or clubbing. No edema.   Skin: Warm. Dry. Good turgor. No rash. No vasculitic stigmata.    Laboratory & Imaging Data--  CBC:                       14.0   5.74  )-----------( 177      ( 14 Apr 2021 07:35 )             40.7     CMP: 04-13    138  |  102  |  22  ----------------------------<  96  4.0   |  24  |  0.86    Ca    9.4      13 Apr 2021 13:36    TPro  6.7  /  Alb  3.4  /  TBili  0.8  /  DBili  x   /  AST  33<H>  /  ALT  19  /  AlkPhos  42  04-13    LIVER FUNCTIONS - ( 13 Apr 2021 13:36 )  Alb: 3.4 g/dL / Pro: 6.7 g/dL / ALK PHOS: 42 U/L / ALT: 19 U/L / AST: 33 U/L / GGT: x               Microbiology: reviewed      Radiology: reviewed  < from: US Kidney and Bladder (04.14.21 @ 09:23) >    EXAM:  US KIDNEYS AND BLADDER        PROCEDURE DATE:  Apr 14 2021         INTERPRETATION:  CLINICAL INFORMATION: Cystic lesion in the upper pole of right kidney identified on a recent CT.    COMPARISON: None available.    TECHNIQUE: Sonography of the kidneys and bladder.    FINDINGS:    Right kidney: 9.7 cm. A hypoechoic lesion of 2.6 cm in the upper pole.    Left kidney: 9.6 cm. No renal mass, hydronephrosis or calculi.    Urinary bladder: Within normal limits.    IMPRESSION:    Hypoechoic lesion in the upper pole of right kidney which corresponds to the cystic lesion identified on recent CT. This may represent a complex cyst or a solid renal mass and should be further characterized with a contrast-enhanced MRI on outpatient basis.                    MASTER BOGGS MD; Attending Radiologist  This document has been electronically signed. 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  Sagittal and coronal reformats were performed.    FINDINGS:  LOWER CHEST:Right lower lobe segmental pulmonary arterial embolus, better evaluated on the prior CTA. Left atrial enlargement. Calcified mitral valve annulus. Calcified coronary plaque. Right lower lobe bulla. Right middle lobe and upper lobe consolidative opacities partially imaged. Left upper lobe 5 mm nodule again noted. Large hiatal hernia.    LIVER: Right hepatic lobe cysts and scattered subcentimeter hypodensities too small to characterize.  BILE DUCTS: Normal caliber.  GALLBLADDER: Within normal limits.  SPLEEN: Within normal limits.  PANCREAS: Within normal limits.  ADRENALS: Indeterminate 1.4 cm left adrenal nodule (2, 41).  KIDNEYS/URETERS: Indeterminate 3.1 cm rounded hypodense lesion in the right upper pole measuring 83 HU with suspected mildadjacent stranding (601, 69). Multiple subcentimeter bilateral additional hypodensities too small to characterize. Excreted contrast from prior PE study within the bilateral collecting systems and ureters. No hydronephrosis.    BLADDER: Streak contrast within the bladder.  REPRODUCTIVE ORGANS: Small calcified uterine myoma. Uterus and adnexa otherwise within normal limits.    BOWEL: Colonic diverticulosis without diverticulitis. No bowel obstruction. Appendix is not visualized. No evidence of inflammation in the pericecal region.  PERITONEUM: No ascites.  VESSELS: Atherosclerotic changes.  RETROPERITONEUM/LYMPH NODES: No lymphadenopathy.  ABDOMINAL WALL: Small fat-containing umbilical hernia.  BONES: Age-indeterminate moderate L1 compression deformity, likely chronic. Degenerative changes.    IMPRESSION:      1. Indeterminate 3.1 cm rounded hypodense lesion in the upper pole of the right kidney with suspected adjacent mild stranding. Differential diagnosis includes renal mass, complex cyst, focal pyelonephritis or infarct. Correlation with contrast-enhanced MRI is recommended. Correlation with urinalysis is also recommended.    2. Lower chest findings better evaluated on same date chest CT.    3. Indeterminate 1.4 similar left adrenalnodule. Follow-up MRI is recommended for characterization.            MIL LAZO MD; Resident Radiology  This document has been electronically signed.  LAKSHMI WHITE MD; Attending Radiologist  This document has been electronically signed. Apr 13 2021  9:42PM    < end of copied text >  < from: CT Angio Chest w/ IV Cont (04.13.21 @ 15:53) >    EXAM:  CT ANGIO CHEST (W)AW IC        PROCEDURE DATE:  Apr 13 2021         INTERPRETATION:  CLINICAL INDICATION: Chest pain, shortness of breath, recent travel.    CT pulmonary angiogram was performed following intravenous administration of 90 cc of Omnipaque-350. 10 cc of contrast was discarded. MIP images are submitted.    No prior chest CTs are available for comparison.    Pulmonary embolus within a right lower lobe large segmental pulmonary arterial branch extending into the subsegmental branches. Right middle lobe pulmonary arterial subsegmental embolus.    No enlarged axillary lymph nodes. A few enlarged mediastinal lymph nodes with the largest in a precarinal location measuring about 1.3 cm.    No pericardial effusion. The left atrium is enlarged measuring about 4.3 cm in anteroposterior dimension. Intimal calcified and noncalcified plaques within the descending thoracic aorta. Coronary artery calcifications. Minimal left pleural fluid.    Evaluation of the upper abdomen demonstrate subcentimeter hepatic hypodensity which is too small characterize. The right hemidiaphragm is elevated. Moderate to large size hiatal hernia. Rounded partially imaged masslike area of decreased enhancement within the upper pole of the partially imaged right kidney.    Evaluation of the lungs demonstrate a few bilateral nodular or masslike consolidations with the largest in the left upper lobe measuring about 8.6 x 4.6 cm. Right mid lung paraspinal and paramediastinal masslike consolidation measures about 4.5 x 3.7 cm with involvement of the central aspect of the right upper and superior segment of the right lower lobe.    Right lower lobe paraspinal chronic cyst part of which measures about 2 x 3 cm mild peripheral wall thickening and containing internal septations.    Mild bilateral mid to lower lung areas of subsegmental atelectasis.    No central endobronchial lesions.    Degenerative changes of the spine.    IMPRESSION: Right lower and middle lobe pulmonary arterial emboli as described above.    Bilateral masslike and nodular consolidations measuring up to 8.6 cm as described above. Differential diagnosis include infectious/inflammatory or neoplastic etiologies. A 1 month follow-up noncontrast chest CT is recommended for complete evaluation.    Mediastinal lymphadenopathy.    2 x 3 cm right lower lobe paraspinal chronic cyst with mild peripheral wall thickening can be monitored on the follow-up chest CT.    Rounded area of decreased enhancement involving the upper pole of the partially imaged right kidney. Differential diagnosis include renal infarct or renal neoplasm. Dedicated contrast-enhanced abdominal CT is recommended for complete evaluation.    Urgent findings were discussed with Dr. Orona on April 13, 2021 at 4:22 PM with read back.              ISIDRO WHITESIDE MD; Attending Radiologist  This document has been electronically signed. Apr 13 2021  4:25PM    < end of copied text >  < from: Xray Chest 1 View- PORTABLE-Urgent (04.13.21 @ 15:24) >    EXAM:  XR CHEST PORTABLE URGENT 1V        PROCEDURE DATE:  Apr 13 2021         INTERPRETATION:  EXAMINATION: XR CHEST URGENT    CLINICAL INFORMATION: Chest Pain    COMPARISON: None.    TECHNIQUE: AP view of the chest.    FINDINGS:    LUNGS AND PLEURA: Peripheral left upper lobe opacity may represent pneumonia versus mass, clinically correlate and short-term follow-up to resolution.    HEART: Heart is normal in size.    BONES: No acute osseous pathology.    MISCELLANEOUS/LINES AND TUBES: None.    IMPRESSION:    Peripheral left upper lobe opacity may represent pneumonia versus mass, clinically correlate and short-term follow-up to resolution.                    CHAVEZ RUSSELL MD; Attending Radiologist  This document has been electronically signed.Apr 13 2021  4:36PM    < end of copied text >  < from: US TTE 2D F/U, Limited w/o Contrast (ED) (04.13.21 @ 14:55) >    Procedure was performed in the Emergency Department by a credentialed Emergency Medicine Attending Physician    EXAM:  ER TTE LIMITED      ORDER COMMENTS:      PROCEDURE DATE:  04/13/2021    FOCUSED ED ULTRASOUND REPORT          INTERPRETATION:  Indication: chest pain    Findings:  A focused transthoracic cardiac ultrasound examination was performed.  No pericardial effusion was present.  EPSS: 5mm  RV: 3.4cm  LV: 4.3cm.  TAPSE: 2.2cm  There is mild concentric LV thickening.  No global wall motion abnormality was identified.  Fractional shortening measurement of EF is 83%    IMPRESSION:  No Pericardial Effusion.  No RV strain. No RV dilation.  Mild LVH.  Preserved LV function.            CHIRAG HEREDIA ATTENDING EM PHYSICIAN  This document hasbeen electronically signed. Apr 13 2021  2:58PM    < end of copied text >

## 2021-04-14 NOTE — H&P ADULT - HISTORY OF PRESENT ILLNESS
Patient is a 80 year old female with PMHx of HTN, ANCA vasculitis, non smoker, presents with acute 9/10 stabbing, non-radiating chest pain with exertional shortness of breath. Not associated with n/v and improved when laying down, patient was seen in the urgent care and had EKG showing TWI in the lateral and anterior leads. Patient reported recent travel to [place] in car but no LE edema or hx blood clots.

## 2021-04-14 NOTE — CONSULT NOTE ADULT - SUBJECTIVE AND OBJECTIVE BOX
CARDIOLOGY CONSULT - Dr. Hooker         HPI:  Patient is a 80 year old female with PMHx of HTN, ANCA vasculitis, non smoker, presents with acute 9/10 stabbing, non-radiating chest pain with exertional shortness of breath. Not associated with n/v and improved when laying down, patient was seen in the urgent care and had EKG showing TWI in the lateral and anterior leads. Patient reported recent travel to [place] in car but no LE edema or hx blood clots.       PAST MEDICAL & SURGICAL HISTORY:  Vasculitis    ANCA-associated vasculitis    No significant past surgical history            PREVIOUS DIAGNOSTIC TESTING:    [ ] Echocardiogram:  [ ]  Catheterization:  [ ] Stress Test:  	    MEDICATIONS:  Home Medications:      MEDICATIONS  (STANDING):  azaTHIOprine 150 milliGRAM(s) Oral daily  citalopram 10 milliGRAM(s) Oral daily  heparin  Infusion. 1000 Unit(s)/Hr (10 mL/Hr) IV Continuous <Continuous>  simvastatin 10 milliGRAM(s) Oral at bedtime      FAMILY HISTORY:      SOCIAL HISTORY:    [ ] Non-smoker  [ ] Smoker  [ ] Alcohol    Allergies    No Known Allergies    Intolerances    	    REVIEW OF SYSTEMS:  CONSTITUTIONAL: No fever, weight loss, or fatigue  EYES: No eye pain, visual disturbances, or discharge  ENMT:  No difficulty hearing, tinnitus, vertigo; No sinus or throat pain  NECK: No pain or stiffness  RESPIRATORY: No cough, wheezing, chills or hemoptysis; No Shortness of Breath  CARDIOVASCULAR: No chest pain, palpitations, passing out, dizziness, or leg swelling  GASTROINTESTINAL: No abdominal or epigastric pain. No nausea, vomiting, or hematemesis; No diarrhea or constipation. No melena or hematochezia.  GENITOURINARY: No dysuria, frequency, hematuria, or incontinence  NEUROLOGICAL: No headaches, memory loss, loss of strength, numbness, or tremors  SKIN: No itching, burning, rashes, or lesions   	    [ ] All others negative	  [ ] Unable to obtain    PHYSICAL EXAM:  T(C): 36.6 (04-14-21 @ 10:00), Max: 36.8 (04-13-21 @ 17:20)  HR: 88 (04-14-21 @ 10:00) (66 - 89)  BP: 153/62 (04-14-21 @ 10:00) (147/76 - 182/73)  RR: 16 (04-14-21 @ 10:00) (16 - 19)  SpO2: 95% (04-14-21 @ 10:00) (95% - 100%)  Wt(kg): --  I&O's Summary    13 Apr 2021 07:01  -  14 Apr 2021 07:00  --------------------------------------------------------  IN: 0 mL / OUT: 400 mL / NET: -400 mL        Appearance: Normal	  Psychiatry: A & O x 3, Mood & affect appropriate  HEENT:   Normal oral mucosa, PERRL, EOMI	  Lymphatic: No lymphadenopathy  Cardiovascular: Normal S1 S2,RRR, No JVD, No murmurs  Respiratory: Lungs clear to auscultation	  Gastrointestinal:  Soft, Non-tender, + BS	  Skin: No rashes, No ecchymoses, No cyanosis	  Neurologic: Non-focal  Extremities: Normal range of motion, No clubbing, cyanosis or edema  Vascular: Peripheral pulses palpable 2+ bilaterally    TELEMETRY: 	    ECG:  	  RADIOLOGY:    < from: CT Angio Chest w/ IV Cont (04.13.21 @ 15:53) >  IMPRESSION: Right lower and middle lobe pulmonary arterial emboli as described above.    Bilateral masslike and nodular consolidations measuring up to 8.6 cm as described above. Differential diagnosis include infectious/inflammatory or neoplastic etiologies. A 1 month follow-up noncontrast chest CT is recommended for complete evaluation.    Mediastinal lymphadenopathy.    2 x 3 cm right lower lobe paraspinal chronic cyst with mild peripheral wall thickening can be monitored on the follow-up chest CT.    Rounded area of decreased enhancement involving the upper pole of the partially imaged right kidney. Differential diagnosis include renal infarct or renal neoplasm. Dedicated contrast-enhanced abdominal CT is recommended for complete evaluation.    Urgent findings were discussed with Dr. Orona on April 13, 2021 at 4:22 PM with read back.            < end of copied text >    OTHER: 	  	  LABS:	 	    CARDIAC MARKERS:  Troponin T, High Sensitivity Result: 7 ng/L (04-13 @ 16:14)  Troponin T, High Sensitivity Result: 6 ng/L (04-13 @ 13:36)                                  14.0   5.74  )-----------( 177      ( 14 Apr 2021 07:35 )             40.7     04-13    138  |  102  |  22  ----------------------------<  96  4.0   |  24  |  0.86    Ca    9.4      13 Apr 2021 13:36    TPro  6.7  /  Alb  3.4  /  TBili  0.8  /  DBili  x   /  AST  33<H>  /  ALT  19  /  AlkPhos  42  04-13    PT/INR - ( 13 Apr 2021 13:36 )   PT: 13.5 sec;   INR: 1.19 ratio         PTT - ( 14 Apr 2021 07:35 )  PTT:102.5 sec  proBNP: Serum Pro-Brain Natriuretic Peptide: 246 pg/mL (04-13 @ 13:36)    Lipid Profile:   HgA1c:   TSH:        CARDIOLOGY CONSULT - Dr. Hooker         HPI:  Patient is a 80 year old female with PMHx of HTN, ANCA vasculitis, Former smoker, presents with acute 9/10 stabbing, non-radiating chest pain with exertional shortness of breath. Not associated with n/v and improved when laying down.  patient was seen in the urgent care and had EKG showing TWI in the lateral and anterior leads. Patient reported recent travel to [place] in car but no LE edema or hx blood clots. She denies hx of arrhythmias, MI , CAD or valvular disease. No recent cardiac work up, Reports family hx of blood clots. CTA chest revealing acute pe, currently on Hep gtt  On exam she denies cp, sob, ROS otherwise negative         PAST MEDICAL & SURGICAL HISTORY:  Vasculitis    ANCA-associated vasculitis    No significant past surgical history            PREVIOUS DIAGNOSTIC TESTING:    [ ] Echocardiogram:  [ ]  Catheterization:  [ ] Stress Test:  	    MEDICATIONS:  Home Medications:      MEDICATIONS  (STANDING):  azaTHIOprine 150 milliGRAM(s) Oral daily  citalopram 10 milliGRAM(s) Oral daily  heparin  Infusion. 1000 Unit(s)/Hr (10 mL/Hr) IV Continuous <Continuous>  simvastatin 10 milliGRAM(s) Oral at bedtime      FAMILY HISTORY:      SOCIAL HISTORY:    [x Former smoker    Allergies    No Known Allergies    Intolerances    	    REVIEW OF SYSTEMS:  CONSTITUTIONAL: No fever, weight loss, or fatigue  EYES: No eye pain, visual disturbances, or discharge  ENMT:  No difficulty hearing, tinnitus, vertigo; No sinus or throat pain  NECK: No pain or stiffness  RESPIRATORY: No cough, wheezing, chills or hemoptysis; No Shortness of Breath  CARDIOVASCULAR: see hpi   GASTROINTESTINAL: No abdominal or epigastric pain. No nausea, vomiting, or hematemesis; No diarrhea or constipation. No melena or hematochezia.  GENITOURINARY: No dysuria, frequency, hematuria, or incontinence  NEUROLOGICAL: No headaches, memory loss, loss of strength, numbness, or tremors  SKIN: No itching, burning, rashes, or lesions   	    [x ] All others negative	  [ ] Unable to obtain    PHYSICAL EXAM:  T(C): 36.6 (04-14-21 @ 10:00), Max: 36.8 (04-13-21 @ 17:20)  HR: 88 (04-14-21 @ 10:00) (66 - 89)  BP: 153/62 (04-14-21 @ 10:00) (147/76 - 182/73)  RR: 16 (04-14-21 @ 10:00) (16 - 19)  SpO2: 95% (04-14-21 @ 10:00) (95% - 100%)  Wt(kg): --  I&O's Summary    13 Apr 2021 07:01  -  14 Apr 2021 07:00  --------------------------------------------------------  IN: 0 mL / OUT: 400 mL / NET: -400 mL        Appearance: Normal	  Psychiatry: A & O x 3, Mood & affect appropriate  HEENT:   Normal oral mucosa, PERRL, EOMI	  Lymphatic: No lymphadenopathy  Cardiovascular: Normal S1 S2,RRR, No JVD, No murmurs  Respiratory: Lungs clear to auscultation	  Gastrointestinal:  Soft, Non-tender, + BS	  Skin: No rashes, No ecchymoses, No cyanosis	  Neurologic: Non-focal  Extremities: Normal range of motion, No clubbing, cyanosis or edema  Vascular: Peripheral pulses palpable 2+ bilaterally    TELEMETRY: 	    ECG:  NSR hr 80. twi v3-v6  twi avl/ I 	  RADIOLOGY:  < from: US TTE 2D F/U, Limited w/o Contrast (ED) (04.13.21 @ 14:55) >    INTERPRETATION:  Indication: chest pain    Findings:  A focused transthoracic cardiac ultrasound examination was performed.  No pericardial effusion was present.  EPSS: 5mm  RV: 3.4cm  LV: 4.3cm.  TAPSE: 2.2cm  There is mild concentric LV thickening.  No global wall motion abnormality was identified.  Fractional shortening measurement of EF is 83%    IMPRESSION:  No Pericardial Effusion.  No RV strain. No RV dilation.  Mild LVH.  Preserved LV function.      < from: CT Angio Chest w/ IV Cont (04.13.21 @ 15:53) >    IMPRESSION: Right lower and middle lobe pulmonary arterial emboli as described above.    Bilateral masslike and nodular consolidations measuring up to 8.6 cm as described above. Differential diagnosis include infectious/inflammatory or neoplastic etiologies. A 1 month follow-up noncontrast chest CT is recommended for complete evaluation.    Mediastinal lymphadenopathy.    2 x 3 cm right lower lobe paraspinal chronic cyst with mild peripheral wall thickening can be monitored on the follow-up chest CT.    Rounded area of decreased enhancement involving the upper pole of the partially imaged right kidney. Differential diagnosis include renal infarct or renal neoplasm. Dedicated contrast-enhanced abdominal CT is recommended for complete evaluation.    Urgent findings were discussed with Dr. Orona on April 13, 2021 at 4:22 PM with read back.            < end of copied text >    OTHER: 	  	  LABS:	 	    CARDIAC MARKERS:  Troponin T, High Sensitivity Result: 7 ng/L (04-13 @ 16:14)  Troponin T, High Sensitivity Result: 6 ng/L (04-13 @ 13:36)                                  14.0   5.74  )-----------( 177      ( 14 Apr 2021 07:35 )             40.7     04-13    138  |  102  |  22  ----------------------------<  96  4.0   |  24  |  0.86    Ca    9.4      13 Apr 2021 13:36    TPro  6.7  /  Alb  3.4  /  TBili  0.8  /  DBili  x   /  AST  33<H>  /  ALT  19  /  AlkPhos  42  04-13    PT/INR - ( 13 Apr 2021 13:36 )   PT: 13.5 sec;   INR: 1.19 ratio         PTT - ( 14 Apr 2021 07:35 )  PTT:102.5 sec  proBNP: Serum Pro-Brain Natriuretic Peptide: 246 pg/mL (04-13 @ 13:36)    Lipid Profile:   HgA1c:   TSH:

## 2021-04-14 NOTE — CONSULT NOTE ADULT - SUBJECTIVE AND OBJECTIVE BOX
04-14-21 @ 11:27    Patient is a 80y old  Female who presents with a chief complaint of chest pain (14 Apr 2021 10:47)      HPI:  Patient is a 80 year old female with PMHx of HTN, ANCA vasculitis, non smoker, presents with acute 9/10 stabbing, non-radiating chest pain with exertional shortness of breath. Not associated with n/v and improved when laying down, patient was seen in the urgent care and had EKG showing TWI in the lateral and anterior leads. Patient reported recent travel to [place] in car but no LE edema or hx blood clots. (14 Apr 2021 10:47)    now she is found to have PE and hence pulmonary called:  She says she has no underlying pulmonary diseases:  She never smoked and not been using any inhalers at home: Currently shei s not having any chest pain :And she is on room air:       ?FOLLOWING PRESENT  [ x] Hx of PE/DVT, [x ] Hx COPD, [x ] Hx of Asthma, [x ] Hx of Hospitalization, [ ]x  Hx of BiPAP/CPAP use, [x ] Hx of KATE    Allergies    No Known Allergies    Intolerances        PAST MEDICAL & SURGICAL HISTORY:  Vasculitis    ANCA-associated vasculitis    No significant past surgical history        FAMILY HISTORY:      Social History: [ x ] TOBACCO                  [x  ] ETOH                                 [x  ] IVDA/DRUGS    REVIEW OF SYSTEMS      General:x	    Skin/Breast:x  	  Ophthalmologic:x  	  ENMT:	x    Respiratory and Thorax: chest pain , SOB  	  Cardiovascular:	x    Gastrointestinal:	  x  Genitourinary:	x    Musculoskeletal:	x    Neurological:	  x  Psychiatric:	  x  Hematology/Lymphatics:	  xx  Endocrine:	x    Allergic/Immunologic:	x    MEDICATIONS  (STANDING):  heparin  Infusion. 1000 Unit(s)/Hr (10 mL/Hr) IV Continuous <Continuous>    MEDICATIONS  (PRN):  heparin   Injectable 5500 Unit(s) IV Push every 6 hours PRN For aPTT less than 40  heparin   Injectable 2500 Unit(s) IV Push every 6 hours PRN For aPTT between 40 - 57       Vital Signs Last 24 Hrs  T(C): 36.6 (14 Apr 2021 10:00), Max: 36.9 (13 Apr 2021 12:11)  T(F): 97.8 (14 Apr 2021 10:00), Max: 98.4 (13 Apr 2021 12:11)  HR: 88 (14 Apr 2021 10:00) (66 - 100)  BP: 153/62 (14 Apr 2021 10:00) (147/76 - 182/73)  BP(mean): 104 (13 Apr 2021 17:20) (104 - 104)  RR: 16 (14 Apr 2021 10:00) (16 - 19)  SpO2: 95% (14 Apr 2021 10:00) (95% - 100%)Orthostatic VS          I&O's Summary    13 Apr 2021 07:01  -  14 Apr 2021 07:00  --------------------------------------------------------  IN: 0 mL / OUT: 400 mL / NET: -400 mL        Physical Exam:   GENERAL: Obese+  HEENT: RANJIT/   Atraumatic, Normocephalic  ENMT: No tonsillar erythema, exudates, or enlargement; Moist mucous membranes, Good dentition, No lesions  NECK: Supple, No JVD, Normal thyroid  CHEST/LUNG: Clear to auscultation bilaterally  CVS: Regular rate and rhythm; No murmurs, rubs, or gallops  GI: : Soft, Nontender, Nondistended; Bowel sounds present  NERVOUS SYSTEM:  Alert & Oriented X3  EXTREMITIES: - edema  LYMPH: No lymphadenopathy noted  SKIN: No rashes or lesions  ENDOCRINOLOGY: No Thyromegaly  PSYCH: Appropriate    Labs:  COVID-19 PCR: NotDetec (13 Apr 2021 13:51)                              14.0   5.74  )-----------( 177      ( 14 Apr 2021 07:35 )             40.7                         12.8   5.82  )-----------( 182      ( 13 Apr 2021 13:36 )             37.3     04-13    138  |  102  |  22  ----------------------------<  96  4.0   |  24  |  0.86    Ca    9.4      13 Apr 2021 13:36    TPro  6.7  /  Alb  3.4  /  TBili  0.8  /  DBili  x   /  AST  33<H>  /  ALT  19  /  AlkPhos  42  04-13    CAPILLARY BLOOD GLUCOSE        LIVER FUNCTIONS - ( 13 Apr 2021 13:36 )  Alb: 3.4 g/dL / Pro: 6.7 g/dL / ALK PHOS: 42 U/L / ALT: 19 U/L / AST: 33 U/L / GGT: x           PT/INR - ( 13 Apr 2021 13:36 )   PT: 13.5 sec;   INR: 1.19 ratio         PTT - ( 14 Apr 2021 07:35 )  PTT:102.5 sec    D DImer  Serum Pro-Brain Natriuretic Peptide: 246 pg/mL (04-13 @ 13:36)      Studies  Chest X-RAY  CT SCAN Chest   CT Abdomen  Venous Dopplers: LE:   Others    r< from: US Kidney and Bladder (04.14.21 @ 09:23) >  E DATE:  Apr 14 2021         INTERPRETATION:  CLINICAL INFORMATION: Cystic lesion in the upper pole of right kidney identified on a recent CT.    COMPARISON: None available.    TECHNIQUE: Sonography of the kidneys and bladder.    FINDINGS:    Right kidney: 9.7 cm. A hypoechoic lesion of 2.6 cm in the upper pole.    Left kidney: 9.6 cm. No renal mass, hydronephrosis or calculi.    Urinary bladder: Within normal limits.    IMPRESSION:    Hypoechoic lesion in the upper pole of right kidney which corresponds to the cystic lesion identified on recent CT. This may represent a complex cyst or a solid renal mass and should be further characterized with a contrast-enhanced MRI on outpatient basis.                    MASTER BOGGS MD; Attending Radiologist  This document has been electronically signed. Apr 14 2021  9:36AM    < end of copied text >  < from: CT Angio Chest w/ IV Cont (04.13.21 @ 15:53) >    Degenerative changes of the spine.    IMPRESSION: Right lower and middle lobe pulmonary arterial emboli as described above.    Bilateral masslike and nodular consolidations measuring up to 8.6 cm as described above. Differential diagnosis include infectious/inflammatory or neoplastic etiologies. A 1 month follow-up noncontrast chest CT is recommended for complete evaluation.    Mediastinal lymphadenopathy.    2 x 3 cm right lower lobe paraspinal chronic cyst with mild peripheral wall thickening can be monitored on the follow-up chest CT.    Rounded area of decreased enhancement involving the upper pole of the partially imaged right kidney. Differential diagnosis include renal infarct or renal neoplasm. Dedicated contrast-enhanced abdominal CT is recommended for complete evaluation.    Urgent findings were discussed with Dr. Orona on April 13, 2021 at 4:22 PM with read back.    < end of copied text >  < from: CT Angio Chest w/ IV Cont (04.13.21 @ 15:53) >    Degenerative changes of the spine.    IMPRESSION: Right lower and middle lobe pulmonary arterial emboli as described above.    Bilateral masslike and nodular consolidations measuring up to 8.6 cm as described above. Differential diagnosis include infectious/inflammatory or neoplastic etiologies. A 1 month follow-up noncontrast chest CT is recommended for complete evaluation.    Mediastinal lymphadenopathy.    2 x 3 cm right lower lobe paraspinal chronic cyst with mild peripheral wall thickening can be monitored on the follow-up chest CT.    Rounded area of decreased enhancement involving the upper pole of the partially imaged right kidney. Differential diagnosis include renal infarct or renal neoplasm. Dedicated contrast-enhanced abdominal CT is recommended for complete evaluation.    Urgent findings were discussed with Dr. Orona on April 13, 2021 at 4:22 PM with read back.    < end of copied text >      ct< from: CT Angio Chest w/ IV Cont (04.13.21 @ 15:53) >  No enlarged axillary lymph nodes. A few enlarged mediastinal lymph nodes with the largest in a precarinal location measuring about 1.3 cm.    < end of copied text >

## 2021-04-15 ENCOUNTER — TRANSCRIPTION ENCOUNTER (OUTPATIENT)
Age: 81
End: 2021-04-15

## 2021-04-15 PROBLEM — Z00.00 ENCOUNTER FOR PREVENTIVE HEALTH EXAMINATION: Status: ACTIVE | Noted: 2021-04-15

## 2021-04-15 LAB
ANION GAP SERPL CALC-SCNC: 12 MMOL/L — SIGNIFICANT CHANGE UP (ref 7–14)
APTT BLD: 105.3 SEC — HIGH (ref 27–36.3)
APTT BLD: 77.4 SEC — HIGH (ref 27–36.3)
APTT BLD: 84.3 SEC — HIGH (ref 27–36.3)
APTT BLD: 96.5 SEC — HIGH (ref 27–36.3)
BLD GP AB SCN SERPL QL: NEGATIVE — SIGNIFICANT CHANGE UP
BUN SERPL-MCNC: 15 MG/DL — SIGNIFICANT CHANGE UP (ref 7–23)
CALCIUM SERPL-MCNC: 8.8 MG/DL — SIGNIFICANT CHANGE UP (ref 8.4–10.5)
CHLORIDE SERPL-SCNC: 100 MMOL/L — SIGNIFICANT CHANGE UP (ref 98–107)
CO2 SERPL-SCNC: 22 MMOL/L — SIGNIFICANT CHANGE UP (ref 22–31)
CREAT SERPL-MCNC: 0.78 MG/DL — SIGNIFICANT CHANGE UP (ref 0.5–1.3)
CRYPTOC AG FLD QL: NEGATIVE — SIGNIFICANT CHANGE UP
GLUCOSE SERPL-MCNC: 104 MG/DL — HIGH (ref 70–99)
HCT VFR BLD CALC: 39.3 % — SIGNIFICANT CHANGE UP (ref 34.5–45)
HCT VFR BLD CALC: 39.9 % — SIGNIFICANT CHANGE UP (ref 34.5–45)
HGB BLD-MCNC: 13.6 G/DL — SIGNIFICANT CHANGE UP (ref 11.5–15.5)
HGB BLD-MCNC: 13.9 G/DL — SIGNIFICANT CHANGE UP (ref 11.5–15.5)
LEGIONELLA AG UR QL: NEGATIVE — SIGNIFICANT CHANGE UP
MCHC RBC-ENTMCNC: 34.1 GM/DL — SIGNIFICANT CHANGE UP (ref 32–36)
MCHC RBC-ENTMCNC: 35.4 GM/DL — SIGNIFICANT CHANGE UP (ref 32–36)
MCHC RBC-ENTMCNC: 35.6 PG — HIGH (ref 27–34)
MCHC RBC-ENTMCNC: 36.5 PG — HIGH (ref 27–34)
MCV RBC AUTO: 103.1 FL — HIGH (ref 80–100)
MCV RBC AUTO: 104.5 FL — HIGH (ref 80–100)
NRBC # BLD: 0 /100 WBCS — SIGNIFICANT CHANGE UP
NRBC # BLD: 0 /100 WBCS — SIGNIFICANT CHANGE UP
NRBC # FLD: 0 K/UL — SIGNIFICANT CHANGE UP
NRBC # FLD: 0 K/UL — SIGNIFICANT CHANGE UP
PLATELET # BLD AUTO: 165 K/UL — SIGNIFICANT CHANGE UP (ref 150–400)
PLATELET # BLD AUTO: 166 K/UL — SIGNIFICANT CHANGE UP (ref 150–400)
POTASSIUM SERPL-MCNC: 4 MMOL/L — SIGNIFICANT CHANGE UP (ref 3.5–5.3)
POTASSIUM SERPL-SCNC: 4 MMOL/L — SIGNIFICANT CHANGE UP (ref 3.5–5.3)
RBC # BLD: 3.81 M/UL — SIGNIFICANT CHANGE UP (ref 3.8–5.2)
RBC # BLD: 3.82 M/UL — SIGNIFICANT CHANGE UP (ref 3.8–5.2)
RBC # FLD: 14.2 % — SIGNIFICANT CHANGE UP (ref 10.3–14.5)
RBC # FLD: 14.4 % — SIGNIFICANT CHANGE UP (ref 10.3–14.5)
RH IG SCN BLD-IMP: POSITIVE — SIGNIFICANT CHANGE UP
SODIUM SERPL-SCNC: 134 MMOL/L — LOW (ref 135–145)
WBC # BLD: 5.74 K/UL — SIGNIFICANT CHANGE UP (ref 3.8–10.5)
WBC # BLD: 6.08 K/UL — SIGNIFICANT CHANGE UP (ref 3.8–10.5)
WBC # FLD AUTO: 5.74 K/UL — SIGNIFICANT CHANGE UP (ref 3.8–10.5)
WBC # FLD AUTO: 6.08 K/UL — SIGNIFICANT CHANGE UP (ref 3.8–10.5)

## 2021-04-15 PROCEDURE — 99222 1ST HOSP IP/OBS MODERATE 55: CPT | Mod: 57

## 2021-04-15 RX ORDER — POLYETHYLENE GLYCOL 3350 17 G/17G
17 POWDER, FOR SOLUTION ORAL DAILY
Refills: 0 | Status: DISCONTINUED | OUTPATIENT
Start: 2021-04-15 | End: 2021-04-21

## 2021-04-15 RX ADMIN — HEPARIN SODIUM 800 UNIT(S)/HR: 5000 INJECTION INTRAVENOUS; SUBCUTANEOUS at 08:28

## 2021-04-15 RX ADMIN — HEPARIN SODIUM 800 UNIT(S)/HR: 5000 INJECTION INTRAVENOUS; SUBCUTANEOUS at 15:39

## 2021-04-15 RX ADMIN — CITALOPRAM 10 MILLIGRAM(S): 10 TABLET, FILM COATED ORAL at 11:28

## 2021-04-15 RX ADMIN — HEPARIN SODIUM 900 UNIT(S)/HR: 5000 INJECTION INTRAVENOUS; SUBCUTANEOUS at 00:32

## 2021-04-15 RX ADMIN — POLYETHYLENE GLYCOL 3350 17 GRAM(S): 17 POWDER, FOR SOLUTION ORAL at 11:58

## 2021-04-15 RX ADMIN — AZATHIOPRINE 150 MILLIGRAM(S): 100 TABLET ORAL at 11:28

## 2021-04-15 RX ADMIN — SIMVASTATIN 10 MILLIGRAM(S): 20 TABLET, FILM COATED ORAL at 21:53

## 2021-04-15 NOTE — PROGRESS NOTE ADULT - ASSESSMENT
Pt is a 80W w/ PMHx of HTN, ANCA vasculitis, p/w with acute 9/10 stabbing, non-radiating chest pain with exertional shortness of breath. Not associated with n/v and improved when laying down, patient was seen in the urgent care and had EKG showing TWI in the lateral and anterior leads. Patient reported recent travel to [place] in car but no LE edema or hx blood clots.    B/l lung masses concerning for infection vs inflammation vs malignancy  CT imaging showing Bilateral masslike and nodular consolidations measuring up to 8.6 cm as described above. Differential diagnosis include infectious/inflammatory or neoplastic etiologies and mediastinal lymphadenopathy.  Would consider bronch or bx for further evaluation--Please send for bacterial/fungal/AFB cx in addition to pathology/cytology  Fungal makers for additional evaluation.  Low suspicion for PNA  However if pt w/ clinical worsening, possible post obstructive PNA; low threshold to start zosyn.    Acute PE  Chest Pain  CTA chest w/ Right lower and middle lobe pulmonary arterial emboli also w/ lung nodules  AC and additional management per primary team    ANCA Vasculitis  on Azathioprine 150MG daily  No hx of biologic use    Infectious Diseases will continue to follow. Please call with any questions.   Deonna Mclean M.D.  [facility], Division of Infectious Diseases [phone]  For over the weekend and after hours, please call [phone]

## 2021-04-15 NOTE — PROGRESS NOTE ADULT - SUBJECTIVE AND OBJECTIVE BOX
CARDIOLOGY FOLLOW UP - Dr. Hooker  DATE OF SERVICE: 04-15-21 @ 13:12    CC resting comfortably  cardiology f/u today for cardiac clearance   Patient denies any chest pain, dyspnea, palpitations, cough, syncope, edema, exertional symptoms, nausea, abdominal pain, fever, chills,  or rash.  Denies any history of CAD, MI, valve disease, arrythmia, cardiomyopathy, or congenital heart disease.     REVIEW OF SYSTEMS:   CONSTITUTIONAL: No fever, weight loss, or fatigue   RESPIRATORY:  No cough, wheezing, chills or hemoptysis; No SOB  CARDIOVASCULAR: No chest pain, palpitations, passing out, dizziness, or leg swelling   GASTROINTESTINAL: No abdominal or epigastric pain. No nausea, vomiting, or hematemesis, no diarreha, or constipation, No melena or hematochezia   VASCULAR: no edema.       PHYSICAL EXAM:  T(C): 36.7 (04-15-21 @ 12:03), Max: 37 (04-14-21 @ 21:32)  HR: 90 (04-15-21 @ 12:03) (78 - 98)  BP: 149/66 (04-15-21 @ 12:03) (149/66 - 156/60)  RR: 16 (04-15-21 @ 12:03) (16 - 16)  SpO2: 95% (04-15-21 @ 12:03) (93% - 95%)  Wt(kg): --  I&O's Summary    14 Apr 2021 07:01  -  15 Apr 2021 07:00  --------------------------------------------------------  IN: 0 mL / OUT: 400 mL / NET: -400 mL        Appearance: Normal	  Cardiovascular: Normal S1 S2,RRR, No JVD, No murmurs  Respiratory: Lungs clear to auscultation	  Gastrointestinal:  Soft, Non-tender, + BS	  Extremities: Normal range of motion, No clubbing, cyanosis or edema      HOME MEDICATIONS:      MEDICATIONS  (STANDING):  azaTHIOprine 150 milliGRAM(s) Oral daily  citalopram 10 milliGRAM(s) Oral daily  heparin  Infusion. 900 Unit(s)/Hr (9 mL/Hr) IV Continuous <Continuous>  polyethylene glycol 3350 17 Gram(s) Oral daily  simvastatin 10 milliGRAM(s) Oral at bedtime      TELEMETRY: 	    ECG:  	  RADIOLOGY:   DIAGNOSTIC TESTING:  [ ] Echocardiogram:   [ ]  Catheterization:  [ ] Stress Test:    OTHER: 	    LABS:	 	                                13.6   5.74  )-----------( 165      ( 15 Apr 2021 07:23 )             39.9     04-15    134<L>  |  100  |  15  ----------------------------<  104<H>  4.0   |  22  |  0.78    Ca    8.8      15 Apr 2021 07:23    TPro  6.7  /  Alb  3.4  /  TBili  0.8  /  DBili  x   /  AST  33<H>  /  ALT  19  /  AlkPhos  42  04-13    PT/INR - ( 13 Apr 2021 13:36 )   PT: 13.5 sec;   INR: 1.19 ratio         PTT - ( 15 Apr 2021 07:23 )  PTT:105.3 sec

## 2021-04-15 NOTE — CONSULT NOTE ADULT - ASSESSMENT
Interventional Radiology    Evaluate for Procedure: possible lung biopsy    HPI: 80y Female with history of HTN, ANCA vasculitis, p/w chest pain and SOB. CTPE shows acute pulmonary embolism; patient is on Hep GTT. Imaging also shows bilateral consolidations measuring up to 8.6cm. IR was consulted for possible lung biopsy.     Allergies:   Medications (Abx/Cardiac/Anticoagulation/Blood Products)  aspirin  chewable: 162 milliGRAM(s) Oral (04-13 @ 13:26)  heparin   Injectable: 5000 Unit(s) IV Push (04-13 @ 17:17)  heparin  Infusion.: 0 Unit(s)/Hr IV Continuous (04-13 @ 17:19)  heparin  Infusion.: 900 Unit(s)/Hr IV Continuous (04-14 @ 08:43)  heparin  Infusion.: 800 Unit(s)/Hr IV Continuous (04-15 @ 00:32)    Data:    T(C): 36.8  HR: 89  BP: 150/62  RR: 16  SpO2: 95%    -WBC 5.74 / HgB 13.6 / Hct 39.9 / Plt 165  -Na 134 / Cl 100 / BUN 15 / Glucose 104  -K 4.0 / CO2 22 / Cr 0.78  -ALT -- / Alk Phos -- / T.Bili --  -INR -- / .3      Radiology:   Evaluation of the lungs demonstrate a few bilateral nodular or masslike consolidations with the largest in the left upper lobe measuring about 8.6 x 4.6 cm. Right mid lung paraspinal and paramediastinal masslike consolidation measures about 4.5 x 3.7 cm with involvement of the central aspect of the right upper and superior segment of the right lower lobe.    Assessment/Plan:   80y Female with history of HTN, ANCA vasculitis, p/w chest pain and SOB. CTPE shows acute pulmonary embolism; patient is on Hep GTT. Imaging also shows bilateral consolidations measuring up to 8.6cm. IR was consulted for possible lung biopsy.   -- imaging reviewed; bilateral consolidations may be secondary to vasculitis flare or infection.   -- left consolidation has a vessel running through  -- at this time IR will defer percutaneous biopsy and recommend continuing medical management (Hep GTT for acute PE, as well as treatment for vasculitis and/or infection)   -- recommend repeat imaging after continued medical management to monitor for possible improvement of consolidations  -- if consolidations persist after treatment and future imaging and biopsy is still desired please re-consult IR service    d/w Dr. Fang Interventional Radiology    Evaluate for Procedure: possible lung biopsy    HPI: 80y Female with history of HTN, ANCA vasculitis, p/w chest pain and SOB. CTPE shows acute pulmonary embolism; patient is on Hep GTT. Imaging also shows bilateral consolidations measuring up to 8.6cm. IR was consulted for possible lung biopsy.     Allergies:   Medications (Abx/Cardiac/Anticoagulation/Blood Products)  aspirin  chewable: 162 milliGRAM(s) Oral (04-13 @ 13:26)  heparin   Injectable: 5000 Unit(s) IV Push (04-13 @ 17:17)  heparin  Infusion.: 0 Unit(s)/Hr IV Continuous (04-13 @ 17:19)  heparin  Infusion.: 900 Unit(s)/Hr IV Continuous (04-14 @ 08:43)  heparin  Infusion.: 800 Unit(s)/Hr IV Continuous (04-15 @ 00:32)    Data:    T(C): 36.8  HR: 89  BP: 150/62  RR: 16  SpO2: 95%    -WBC 5.74 / HgB 13.6 / Hct 39.9 / Plt 165  -Na 134 / Cl 100 / BUN 15 / Glucose 104  -K 4.0 / CO2 22 / Cr 0.78  -ALT -- / Alk Phos -- / T.Bili --  -INR -- / .3      Radiology:   Evaluation of the lungs demonstrate a few bilateral nodular or masslike consolidations with the largest in the left upper lobe measuring about 8.6 x 4.6 cm. Right mid lung paraspinal and paramediastinal masslike consolidation measures about 4.5 x 3.7 cm with involvement of the central aspect of the right upper and superior segment of the right lower lobe.    Assessment/Plan:   80y Female with history of HTN, ANCA vasculitis, p/w chest pain and SOB. CTPE shows acute pulmonary embolism; patient is on Hep GTT. Imaging also shows bilateral consolidations measuring up to 8.6cm. IR was consulted for possible lung biopsy.   -- imaging reviewed; bilateral consolidations may be secondary to vasculitis flare or infection.   -- left consolidation has a vessel running through. Case discussed with Dr. Penn who is requesting thoracici surgery input prior to intervention  -- at this time IR will defer percutaneous biopsy     d/w Dr. Fang

## 2021-04-15 NOTE — PROGRESS NOTE ADULT - ASSESSMENT
Patient is a 80 year old female with PMHx of HTN, ANCA vasculitis, non smoker, presents with acute 9/10 stabbing, non-radiating chest pain with exertional shortness of breath. Not associated with n/v and improved when laying down, patient was seen in the urgent care and had EKG showing TWI in the lateral and anterior leads. Patient reported recent travel to [place] in car but no LE edema or hx blood clots.    PE:  Lung masses  R renal infarct: CT showing possible R renal infarct, No complaints of R flank pain or urinary symptoms, Renal US IMPRESSION: Hypoechoic lesion in the upper pole of right kidney which corresponds to the cystic lesion identified on recent CT. This may represent a complex cyst or a solid renal mass and should be further characterized with a contrast-enhanced MRI on outpatient basis.  HTN:  ANCA Vasculitis:    4/14/2021:      PE: on heparin: get dopplers are postive and echo is still pending? noac ?  ANCA Vasculitis: on azathioprine: 150 mg per day : LFTs are normal   Lung masses: LEIGHA ROLAND: She has large masses on both sides and small nodule: RUL and LLL: may be exacerbation of vasculitis ?: core biopsy vs vats biopsy : get RHEUM involved and ID involved: ? fungal infection can happen: id consult: she does not look sick   No enlarged axillary lymph nodes. A few enlarged mediastinal lymph nodes with the largest in a precarinal location measuring about 1.3 cm.: leigha roland: small : follow up   R renal infarct: CT showing possible R renal infarct, No complaints of R flank pain or urinary symptoms, Renal US IMPRESSION: Hypoechoic lesion in the upper pole of right kidney which corresponds to the cystic lesion identified on recent CT. This may represent a complex cyst or a solid renal mass and should be further characterized with a contrast-enhanced MRI on outpatient basis.  HTN: controlled  DW PMD

## 2021-04-15 NOTE — CHART NOTE - NSCHARTNOTEFT_GEN_A_CORE
pt booked for FB, LVATS, lung resection tomorrow  will come to d/w pt after pulmonary discussion later today  pt will need cardiac clearance for OR  plan to hold heparin gtt romorrow 4/16/21 at 6am  Full consult to follow    Brianna OSWALD [id number]

## 2021-04-15 NOTE — PROGRESS NOTE ADULT - SUBJECTIVE AND OBJECTIVE BOX
[facility], Division of Infectious Diseases  ANDREW Rodríguez Y. Patel, S. Shah  [phone]    Name: BETTIE PRATER  Age: 80y  Gender: Female  MRN: [id number]  Note Date: 04-15-21    Interval History:  Patient seen and examined at bedside this morning  No acute overnight events. Afebrile  Sleeping comfortably  Notes reviewed    Antibiotics:      Medications:  azaTHIOprine 150 milliGRAM(s) Oral daily  citalopram 10 milliGRAM(s) Oral daily  heparin   Injectable 5500 Unit(s) IV Push every 6 hours PRN  heparin   Injectable 2500 Unit(s) IV Push every 6 hours PRN  heparin  Infusion. 900 Unit(s)/Hr IV Continuous <Continuous>  simvastatin 10 milliGRAM(s) Oral at bedtime      Review of Systems:  unable to obtain    Allergies: No Known Allergies    For details regarding the patient's past medical history, social history, family history, and other miscellaneous elements, please refer the initial infectious diseases consultation and/or the admitting history and physical examination for this admission.    Objective:  Vitals:   T(C): 36.8 (04-15-21 @ 05:28), Max: 37 (04-14-21 @ 21:32)  HR: 89 (04-15-21 @ 05:28) (78 - 98)  BP: 150/62 (04-15-21 @ 05:28) (150/62 - 156/60)  RR: 16 (04-15-21 @ 05:28) (16 - 16)  SpO2: 95% (04-15-21 @ 05:28) (93% - 95%)    Physical Examination:  General: no acute distress  HEENT: NC/AT, EOMI, anicteric, no oral lesions  Neck: supple, no palpable LAD  Cardio: S1, S2 heard, RRR, no murmurs  Resp: breath sounds heard bilaterally, no rales, wheezes or rhonchi  Abd: soft, NT, ND, + bowel sounds  Neuro: AAOx3, no obvious focal deficits  Ext: no edema or cyanosis  Skin: warm, dry, no visible rash  Lines:    Laboratory Studies:  CBC:                       13.6   5.74  )-----------( 165      ( 15 Apr 2021 07:23 )             39.9     CMP: 04-15    134<L>  |  100  |  15  ----------------------------<  104<H>  4.0   |  22  |  0.78    Ca    8.8      15 Apr 2021 07:23    TPro  6.7  /  Alb  3.4  /  TBili  0.8  /  DBili  x   /  AST  33<H>  /  ALT  19  /  AlkPhos  42  04-13    LIVER FUNCTIONS - ( 13 Apr 2021 13:36 )  Alb: 3.4 g/dL / Pro: 6.7 g/dL / ALK PHOS: 42 U/L / ALT: 19 U/L / AST: 33 U/L / GGT: x             Microbiology: reviewed      Radiology: reviewed  < from: US Duplex Venous Lower Ext Complete, Bilateral (04.14.21 @ 15:27) >    EXAM:  US DPLX LWR EXT VEINS COMPL BI        PROCEDURE DATE:  Apr 14 2021         INTERPRETATION:  CLINICAL INFORMATION: Patient on heparin, pulmonary emboli.    COMPARISON: None available.    TECHNIQUE: Duplex sonography of the BILATERAL LOWER extremity veins with color and spectral Doppler, with and without compression.    FINDINGS:    RIGHT:  Normal compressibility of the RIGHT common femoral, femoral and popliteal veins.  Doppler examination shows normal spontaneous and phasic flow.  Posterior tibial and peroneal veins demonstrate lack of color Doppler flow and are incompressible.    LEFT:  Normal compressibility of the LEFT common femoral, femoral and popliteal veins.  Doppler examination shows normal spontaneous and phasic flow.  Posterior tibial and peroneal veins demonstrate lack of color Doppler flow and are incompressible.    IMPRESSION:  Bilateral below the knee deep venous thrombosis.  Findings discussed with DEEPA Chong at 5:00 PM on 4/14/2021 with read back.                MASTER BOGGS MD; Attending Radiologist  This document has been electronically signed. Apr 14 2021  4:59PM    < end of copied text >

## 2021-04-15 NOTE — PROGRESS NOTE ADULT - SUBJECTIVE AND OBJECTIVE BOX
Date of Service: 04-15-21 @ 11:06    Patient is a 80y old  Female who presents with a chief complaint of chest pain (15 Apr 2021 10:56)      Any change in ROS:doing pretty well:       MEDICATIONS  (STANDING):  azaTHIOprine 150 milliGRAM(s) Oral daily  citalopram 10 milliGRAM(s) Oral daily  heparin  Infusion. 900 Unit(s)/Hr (9 mL/Hr) IV Continuous <Continuous>  simvastatin 10 milliGRAM(s) Oral at bedtime    MEDICATIONS  (PRN):  heparin   Injectable 5500 Unit(s) IV Push every 6 hours PRN For aPTT less than 40  heparin   Injectable 2500 Unit(s) IV Push every 6 hours PRN For aPTT between 40 - 57    Vital Signs Last 24 Hrs  T(C): 36.8 (15 Apr 2021 05:28), Max: 37 (14 Apr 2021 21:32)  T(F): 98.3 (15 Apr 2021 05:28), Max: 98.6 (14 Apr 2021 21:32)  HR: 89 (15 Apr 2021 05:28) (78 - 98)  BP: 150/62 (15 Apr 2021 05:28) (150/62 - 156/60)  BP(mean): --  RR: 16 (15 Apr 2021 05:28) (16 - 16)  SpO2: 95% (15 Apr 2021 05:28) (93% - 95%)    I&O's Summary    14 Apr 2021 07:01  -  15 Apr 2021 07:00  --------------------------------------------------------  IN: 0 mL / OUT: 400 mL / NET: -400 mL          Physical Exam:   GENERAL: NAD, well-groomed, well-developed  HEENT: RANJIT/   Atraumatic, Normocephalic  ENMT: No tonsillar erythema, exudates, or enlargement; Moist mucous membranes, Good dentition, No lesions  NECK: Supple, No JVD, Normal thyroid  CHEST/LUNG: Clear to ausculation  CVS: Regular rate and rhythm; No murmurs, rubs, or gallops  GI: : Soft, Nontender, Nondistended; Bowel sounds present  NERVOUS SYSTEM:  Alert & Oriented X3  EXTREMITIES: - edema  LYMPH: No lymphadenopathy noted  SKIN: No rashes or lesions  ENDOCRINOLOGY: No Thyromegaly  PSYCH: Appropriate    Labs:                              13.6   5.74  )-----------( 165      ( 15 Apr 2021 07:23 )             39.9                         14.0   5.74  )-----------( 177      ( 14 Apr 2021 07:35 )             40.7                         12.8   5.82  )-----------( 182      ( 13 Apr 2021 13:36 )             37.3     04-15    134<L>  |  100  |  15  ----------------------------<  104<H>  4.0   |  22  |  0.78  04-13    138  |  102  |  22  ----------------------------<  96  4.0   |  24  |  0.86    Ca    8.8      15 Apr 2021 07:23  Ca    9.4      13 Apr 2021 13:36    TPro  6.7  /  Alb  3.4  /  TBili  0.8  /  DBili  x   /  AST  33<H>  /  ALT  19  /  AlkPhos  42  04-13    CAPILLARY BLOOD GLUCOSE          LIVER FUNCTIONS - ( 13 Apr 2021 13:36 )  Alb: 3.4 g/dL / Pro: 6.7 g/dL / ALK PHOS: 42 U/L / ALT: 19 U/L / AST: 33 U/L / GGT: x           PT/INR - ( 13 Apr 2021 13:36 )   PT: 13.5 sec;   INR: 1.19 ratio         PTT - ( 15 Apr 2021 07:23 )  PTT:105.3 sec    Procalcitonin, Serum: 0.07 ng/mL (04-13 @ 16:37)  Serum Pro-Brain Natriuretic Peptide: 246 pg/mL (04-13 @ 13:36)        RECENT CULTURES:        RESPIRATORY CULTURES:    r< from: US Duplex Venous Lower Ext Complete, Bilateral (04.14.21 @ 15:27) >  PROCEDURE DATE:  Apr 14 2021         INTERPRETATION:  CLINICAL INFORMATION: Patient on heparin, pulmonary emboli.    COMPARISON: None available.    TECHNIQUE: Duplex sonography of the BILATERAL LOWER extremity veins with color and spectral Doppler, with and without compression.    FINDINGS:    RIGHT:  Normal compressibility of the RIGHT common femoral, femoral and popliteal veins.  Doppler examination shows normal spontaneous and phasic flow.  Posterior tibial and peroneal veins demonstrate lack of color Doppler flow and are incompressible.    LEFT:  Normal compressibility of the LEFT common femoral, femoral and popliteal veins.  Doppler examination shows normal spontaneous and phasic flow.  Posterior tibial and peroneal veins demonstrate lack of color Doppler flow and are incompressible.    IMPRESSION:  Bilateral below the knee deep venous thrombosis.  Findings discussed with RN Rosario Chong at 5:00 PM on 4/14/2021 with read back.                MASTER BOGGS MD; Attending Radiologist  This document has been electronically signed. Apr 14 2021  4:59PM    < end of copied text >  < from: CT Angio Chest w/ IV Cont (04.13.21 @ 15:53) >      < end of copied text >        Studies  Chest X-RAY  CT SCAN Chest   Venous Dopplers: LE:   CT Abdomen  Others

## 2021-04-15 NOTE — CONSULT NOTE ADULT - SUBJECTIVE AND OBJECTIVE BOX
HPI:  Patient is a 80 year old female with PMHx of HTN, ANCA vasculitis, non smoker, presents with acute 9/10 stabbing, non-radiating chest pain with exertional shortness of breath. Not associated with n/v and improved when laying down, patient was seen in the urgent care and had EKG showing TWI in the lateral and anterior leads. Patient reported recent travel to [place] in car but no LE edema or hx blood clots. (14 Apr 2021 10:47)     Pt had a CT angio of chest which showed large b/l lung masses. CTS consulted for possible VATS/bx      PAST MEDICAL & SURGICAL HISTORY:  Vasculitis    ANCA-associated vasculitis    No significant past surgical history        REVIEW OF SYSTEMS      General:No Weight change/ Fatigue/ HA/Dizzy	  Skin/Breast: No Rashes/ Lesions/ Masses  Ophthalmologic: No Blurry vision/ Glaucoma/ Blindness	  ENMT: No Hearing loss/ Drainage/ Lesions	  Respiratory and Thorax: No Cough/ Wheezing/ SOB/ Hemoptysis/ Sputum production	  Cardiovascular: No Chest pain/ Palpitations/ Diaphoresis	  Gastrointestinal: No Nausea/ Vomiting/ Constipation/ Appetite Change	  Genitourinary: No Heamturia/ Dysuria/ Frequency change/ Impotence	  Musculoskeletal: No Pain/ Weakness/ Claudication	  Neurological: No Seizures/ TIA/CVA/ Parastesias	  Psychiatric: No Dementia/ Depression/ SI/HI	  Hematology/Lymphatics: No hx of bleeding/ Edema	  Endocrine:	No Hyperglycemia/ Hypoglycemia  Allergic/Immunologic:	 No Anaphylaxis/ Intolerance/ Recent illnesses    MEDICATIONS  (STANDING):  azaTHIOprine 150 milliGRAM(s) Oral daily  citalopram 10 milliGRAM(s) Oral daily  heparin  Infusion. 900 Unit(s)/Hr (9 mL/Hr) IV Continuous <Continuous>  polyethylene glycol 3350 17 Gram(s) Oral daily  simvastatin 10 milliGRAM(s) Oral at bedtime    MEDICATIONS  (PRN):  heparin   Injectable 5500 Unit(s) IV Push every 6 hours PRN For aPTT less than 40  heparin   Injectable 2500 Unit(s) IV Push every 6 hours PRN For aPTT between 40 - 57      Allergies    No Known Allergies    Intolerances        SOCIAL HISTORY:  Occupation:  Smoking Hx: denies  Etoh Hx: denies  IVDA Hx: denies    FAMILY HISTORY:    unless noted, no significant family hx with Mother, Father, Siblings    Vital Signs Last 24 Hrs  T(C): 36.7 (15 Apr 2021 12:03), Max: 37 (14 Apr 2021 21:32)  T(F): 98 (15 Apr 2021 12:03), Max: 98.6 (14 Apr 2021 21:32)  HR: 90 (15 Apr 2021 12:03) (78 - 98)  BP: 149/66 (15 Apr 2021 12:03) (149/66 - 156/60)  BP(mean): --  RR: 16 (15 Apr 2021 12:03) (16 - 16)  SpO2: 95% (15 Apr 2021 12:03) (93% - 95%)    General: WN/WD NAD  Neurology: Awake, nonfocal, WEINSTEIN x 4  Eyes: Scleras clear, PERRLA/ EOMI, Gross vision intact  ENT:Gross hearing intact, grossly patent pharynx, no stridor  Neck: Neck supple, trachea midline, No JVD,   Respiratory: CTA B/L, No wheezing, rales, rhonchi  CV: RRR, S1S2, no murmurs, rubs or gallops  Abdominal: Soft, NT, ND +BS,   Extremities: No edema, + peripheral pulses  Skin: No Rashes, Hematoma, Ecchymosis  Lymphatic: No Neck, axilla, groin LAD  Psych: Oriented x 3, normal affect      LABS:                        13.6   5.74  )-----------( 165      ( 15 Apr 2021 07:23 )             39.9     04-15    134<L>  |  100  |  15  ----------------------------<  104<H>  4.0   |  22  |  0.78    Ca    8.8      15 Apr 2021 07:23    TPro  6.7  /  Alb  3.4  /  TBili  0.8  /  DBili  x   /  AST  33<H>  /  ALT  19  /  AlkPhos  42  04-13    PT/INR - ( 13 Apr 2021 13:36 )   PT: 13.5 sec;   INR: 1.19 ratio         PTT - ( 15 Apr 2021 07:23 )  PTT:105.3 sec      RADIOLOGY & ADDITIONAL STUDIES:    ASSESSMENT/PLAN:  Patient is a 80 year old female with PMHx of HTN, ANCA vasculitis, non smoker, presents with PE currently on hep gtt. CT Angio revealed large b/l pulmonary masses. CTS consulted for LVATS lung biopsy     - Add on for LVATS, Lung resection tomorrow  - Called cards for cardiac clearance  - Hold hep gtt at 6AM  - Last COVID test at 2PM on 4/13. COVID tests valid for 72 hours; tentative book time is 430PM. Will need another COVID test.  - NPOpMN, CBC, BMP Coags in AM    Goran Malave PAC [id number]

## 2021-04-15 NOTE — PROGRESS NOTE ADULT - SUBJECTIVE AND OBJECTIVE BOX
SUBJECTIVE / OVERNIGHT EVENTS:  Pt seen and examined at bedside.   No overnight event.  Feeling better.  no cp, no sob, no n/v/d.   feels well  breathing better.   tentative VATs        --------------------------------------------------------------------------------------------  LABS:                        13.6   5.74  )-----------( 165      ( 15 Apr 2021 07:23 )             39.9     04-15    134<L>  |  100  |  15  ----------------------------<  104<H>  4.0   |  22  |  0.78    Ca    8.8      15 Apr 2021 07:23    TPro  6.7  /  Alb  3.4  /  TBili  0.8  /  DBili  x   /  AST  33<H>  /  ALT  19  /  AlkPhos  42  04-13    PT/INR - ( 13 Apr 2021 13:36 )   PT: 13.5 sec;   INR: 1.19 ratio         PTT - ( 15 Apr 2021 07:23 )  PTT:105.3 sec  CAPILLARY BLOOD GLUCOSE                RADIOLOGY & ADDITIONAL TESTS:    Imaging Personally Reviewed:  [x] YES  [ ] NO    Consultant(s) Notes Reviewed:  [x] YES  [ ] NO    MEDICATIONS  (STANDING):  azaTHIOprine 150 milliGRAM(s) Oral daily  citalopram 10 milliGRAM(s) Oral daily  heparin  Infusion. 900 Unit(s)/Hr (9 mL/Hr) IV Continuous <Continuous>  polyethylene glycol 3350 17 Gram(s) Oral daily  simvastatin 10 milliGRAM(s) Oral at bedtime    MEDICATIONS  (PRN):  heparin   Injectable 5500 Unit(s) IV Push every 6 hours PRN For aPTT less than 40  heparin   Injectable 2500 Unit(s) IV Push every 6 hours PRN For aPTT between 40 - 57      Care Discussed with Consultants/Other Providers [x] YES  [ ] NO    Vital Signs Last 24 Hrs  T(C): 36.8 (15 Apr 2021 05:28), Max: 37 (14 Apr 2021 21:32)  T(F): 98.3 (15 Apr 2021 05:28), Max: 98.6 (14 Apr 2021 21:32)  HR: 89 (15 Apr 2021 05:28) (78 - 98)  BP: 150/62 (15 Apr 2021 05:28) (150/62 - 156/60)  BP(mean): --  RR: 16 (15 Apr 2021 05:28) (16 - 16)  SpO2: 95% (15 Apr 2021 05:28) (93% - 95%)  I&O's Summary    14 Apr 2021 07:01  -  15 Apr 2021 07:00  --------------------------------------------------------  IN: 0 mL / OUT: 400 mL / NET: -400 mL      PHYSICAL EXAM:  GENERAL: NAD, well-developed, comfortable  HEAD:  Atraumatic, Normocephalic  EYES: EOMI, PERRLA, conjunctiva and sclera clear  NECK: Supple, No JVD  CHEST/LUNG: mild decrease breath sounds bilaterally; No wheeze   HEART: Regular rate and rhythm; No murmurs, rubs, or gallops  ABDOMEN: Soft, Nontender, Nondistended; Bowel sounds present  Neuro: AAOx3, no focal weakness, 5/5 b/l extremity strength  EXTREMITIES:  2+ Peripheral Pulses, No clubbing, cyanosis, or edema  SKIN: No rashes or lesions

## 2021-04-15 NOTE — PROGRESS NOTE ADULT - ASSESSMENT
80 year old female with PMHx of HTN, ANCA vasculitis, Former smoker, presents with acute 9/10 stabbing, non-radiating chest pain with exertional shortness of breath with twi on ecg;  CTa revealed acute pe     1. Atypical chest pain   -in the setting of acute PE/ lung mass   -Ecg with twi noted to anterolateral leads  -HS trop neg  -echo pending  -continue a.c for PE     2. 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 mass or cyst  -pulm following , med f/u   -Echo pending  -c/w hep gtt       3. Lung Mass, Mediastinal lymphadenopathy  -CT Angio revealed large b/l pulmonary masses.  -plan for LVATS, Lung resection tomorrow  -CTS f/u   -patient denies significant cardiac history, no cp/sob/cui, no evidence of arrythmia or CHF on exam  -patent cardiac optimized and can proceed with LVATS, lung resection with acceptable cv risk.     4. ANCA Vasculitis  -med , id f/u

## 2021-04-15 NOTE — PROGRESS NOTE ADULT - ASSESSMENT
80 year old female with PMHx of HTN, ANCA vasculitis (dx 20 years ago, been on Azathioprine since then, being followed by Rheum outpt), non smoker, presents with acute 9/10 stabbing, non-radiating chest pain lasting 10-15 mins. with exertional shortness of breath. EKG showing TWI in the lateral and anterior leads. Patient reported recent travel to [place] in car (~10 hrs ride).     # Acute pulmonary embolus, first episode:  Prolonged car ride and recent sedentary life style during pandemic. no family hx.  CTa chest reviewed. large pulm masses b/l noted: infectious vs. inflammatory.   Pulm Consulted. Will tentatively plan for biopsy. VATs. ID eval per pulm. check TTE, LE dopplers.   She is medically optimized for procedure. pending card clearance.     # hx ANCA vasculitis:   Chronic, stable, Cont home medication regime Azathioprine 150MG daily    # upper pole of right kidney: complex cyst? outpt MRI. also biopsy of lung mass as above.    # HTN: monitor BP    Physical therapy. Out of bed to chair with assistance. monitor O2 status.   # DVT ppx:  On Hep gtt

## 2021-04-15 NOTE — CONSULT NOTE ADULT - TIME BILLING
high complexity of this case.  Discussed at length with pulmonary
Pt seen and examined, agree with the above assessment and plan by JUSTIN Brooks.  80 year old female with PMHx of HTN, ANCA vasculitis, Former smoker, presents with acute 9/10 stabbing, non-radiating chest pain with exertional shortness of breath with twi on ecg;  CTa revealed acute pe     1. Atypical chest pain   -in the setting of acute PE   -Ecg with twi noted to anterolateral leads  -HS trop neg  -check echo   -continue a.c for PE     2. Acute PE   -CTa chest with RLL, RML PE  -Cta also revealed  Bilateral masslike and nodular consolidations measuring up to 8.6 cm, Mediastinal lymphadenopathy.2 x 3 cm right lower lobe paraspinal chronic cyst, right kidney ? mass or cyst  -pulm following , med f/u   -Check echo, dopplers, c/w hep gtt    3. Lung Mass, Mediastinal lymphadenopathy  -renal US noted ? mass vs cyst   -work up per pulm. med     4. ANCA Vasculitis  -med , id f/u

## 2021-04-16 ENCOUNTER — RESULT REVIEW (OUTPATIENT)
Age: 81
End: 2021-04-16

## 2021-04-16 ENCOUNTER — APPOINTMENT (OUTPATIENT)
Dept: THORACIC SURGERY | Facility: HOSPITAL | Age: 81
End: 2021-04-16

## 2021-04-16 PROBLEM — I77.6 ARTERITIS, UNSPECIFIED: Chronic | Status: ACTIVE | Noted: 2021-04-13

## 2021-04-16 LAB
ANION GAP SERPL CALC-SCNC: 11 MMOL/L — SIGNIFICANT CHANGE UP (ref 7–14)
APTT BLD: 83.9 SEC — HIGH (ref 27–36.3)
BLD GP AB SCN SERPL QL: NEGATIVE — SIGNIFICANT CHANGE UP
BUN SERPL-MCNC: 15 MG/DL — SIGNIFICANT CHANGE UP (ref 7–23)
CALCIUM SERPL-MCNC: 9.1 MG/DL — SIGNIFICANT CHANGE UP (ref 8.4–10.5)
CHLORIDE SERPL-SCNC: 100 MMOL/L — SIGNIFICANT CHANGE UP (ref 98–107)
CO2 SERPL-SCNC: 23 MMOL/L — SIGNIFICANT CHANGE UP (ref 22–31)
CREAT SERPL-MCNC: 0.91 MG/DL — SIGNIFICANT CHANGE UP (ref 0.5–1.3)
GALACTOMANNAN AG SERPL-ACNC: <0.5 INDEX — SIGNIFICANT CHANGE UP
GLUCOSE SERPL-MCNC: 107 MG/DL — HIGH (ref 70–99)
GRAM STN FLD: SIGNIFICANT CHANGE UP
HCT VFR BLD CALC: 40.6 % — SIGNIFICANT CHANGE UP (ref 34.5–45)
HGB BLD-MCNC: 13.5 G/DL — SIGNIFICANT CHANGE UP (ref 11.5–15.5)
INR BLD: 1.1 RATIO — SIGNIFICANT CHANGE UP (ref 0.88–1.16)
MCHC RBC-ENTMCNC: 33.3 GM/DL — SIGNIFICANT CHANGE UP (ref 32–36)
MCHC RBC-ENTMCNC: 34.9 PG — HIGH (ref 27–34)
MCV RBC AUTO: 104.9 FL — HIGH (ref 80–100)
NRBC # BLD: 0 /100 WBCS — SIGNIFICANT CHANGE UP
NRBC # FLD: 0 K/UL — SIGNIFICANT CHANGE UP
PLATELET # BLD AUTO: 160 K/UL — SIGNIFICANT CHANGE UP (ref 150–400)
POTASSIUM SERPL-MCNC: 4.9 MMOL/L — SIGNIFICANT CHANGE UP (ref 3.5–5.3)
POTASSIUM SERPL-SCNC: 4.9 MMOL/L — SIGNIFICANT CHANGE UP (ref 3.5–5.3)
PROTHROM AB SERPL-ACNC: 12.6 SEC — SIGNIFICANT CHANGE UP (ref 10.6–13.6)
RBC # BLD: 3.87 M/UL — SIGNIFICANT CHANGE UP (ref 3.8–5.2)
RBC # FLD: 14.5 % — SIGNIFICANT CHANGE UP (ref 10.3–14.5)
RH IG SCN BLD-IMP: POSITIVE — SIGNIFICANT CHANGE UP
SARS-COV-2 RNA SPEC QL NAA+PROBE: SIGNIFICANT CHANGE UP
SODIUM SERPL-SCNC: 134 MMOL/L — LOW (ref 135–145)
SPECIMEN SOURCE: SIGNIFICANT CHANGE UP
WBC # BLD: 5.85 K/UL — SIGNIFICANT CHANGE UP (ref 3.8–10.5)
WBC # FLD AUTO: 5.85 K/UL — SIGNIFICANT CHANGE UP (ref 3.8–10.5)

## 2021-04-16 PROCEDURE — 99233 SBSQ HOSP IP/OBS HIGH 50: CPT

## 2021-04-16 PROCEDURE — 88342 IMHCHEM/IMCYTCHM 1ST ANTB: CPT | Mod: 26,59

## 2021-04-16 PROCEDURE — 88364 INSITU HYBRIDIZATION (FISH): CPT | Mod: 26

## 2021-04-16 PROCEDURE — 88367 INSITU HYBRIDIZATION AUTO: CPT | Mod: 26

## 2021-04-16 PROCEDURE — 88360 TUMOR IMMUNOHISTOCHEM/MANUAL: CPT | Mod: 26

## 2021-04-16 PROCEDURE — 88341 IMHCHEM/IMCYTCHM EA ADD ANTB: CPT | Mod: 26,59

## 2021-04-16 PROCEDURE — 32666 THORACOSCOPY W/WEDGE RESECT: CPT | Mod: LT

## 2021-04-16 PROCEDURE — 88331 PATH CONSLTJ SURG 1 BLK 1SPC: CPT | Mod: 26

## 2021-04-16 PROCEDURE — 88365 INSITU HYBRIDIZATION (FISH): CPT | Mod: 26,59

## 2021-04-16 PROCEDURE — 88307 TISSUE EXAM BY PATHOLOGIST: CPT | Mod: 26

## 2021-04-16 PROCEDURE — 88313 SPECIAL STAINS GROUP 2: CPT | Mod: 26

## 2021-04-16 PROCEDURE — 99447 NTRPROF PH1/NTRNET/EHR 11-20: CPT | Mod: GC

## 2021-04-16 PROCEDURE — 71045 X-RAY EXAM CHEST 1 VIEW: CPT | Mod: 26

## 2021-04-16 RX ORDER — NALOXONE HYDROCHLORIDE 4 MG/.1ML
0.1 SPRAY NASAL
Refills: 0 | Status: DISCONTINUED | OUTPATIENT
Start: 2021-04-16 | End: 2021-04-18

## 2021-04-16 RX ORDER — ONDANSETRON 8 MG/1
4 TABLET, FILM COATED ORAL EVERY 6 HOURS
Refills: 0 | Status: DISCONTINUED | OUTPATIENT
Start: 2021-04-16 | End: 2021-04-21

## 2021-04-16 RX ORDER — SODIUM CHLORIDE 9 MG/ML
1000 INJECTION, SOLUTION INTRAVENOUS
Refills: 0 | Status: DISCONTINUED | OUTPATIENT
Start: 2021-04-16 | End: 2021-04-18

## 2021-04-16 RX ORDER — DIPHENHYDRAMINE HCL 50 MG
25 CAPSULE ORAL EVERY 4 HOURS
Refills: 0 | Status: DISCONTINUED | OUTPATIENT
Start: 2021-04-16 | End: 2021-04-18

## 2021-04-16 RX ORDER — ACETAMINOPHEN 500 MG
650 TABLET ORAL EVERY 6 HOURS
Refills: 0 | Status: DISCONTINUED | OUTPATIENT
Start: 2021-04-16 | End: 2021-04-21

## 2021-04-16 RX ORDER — SODIUM CHLORIDE 9 MG/ML
250 INJECTION INTRAMUSCULAR; INTRAVENOUS; SUBCUTANEOUS ONCE
Refills: 0 | Status: COMPLETED | OUTPATIENT
Start: 2021-04-16

## 2021-04-16 RX ORDER — HYDROMORPHONE HYDROCHLORIDE 2 MG/ML
0.5 INJECTION INTRAMUSCULAR; INTRAVENOUS; SUBCUTANEOUS
Refills: 0 | Status: DISCONTINUED | OUTPATIENT
Start: 2021-04-16 | End: 2021-04-18

## 2021-04-16 RX ORDER — HYDROMORPHONE HYDROCHLORIDE 2 MG/ML
30 INJECTION INTRAMUSCULAR; INTRAVENOUS; SUBCUTANEOUS
Refills: 0 | Status: DISCONTINUED | OUTPATIENT
Start: 2021-04-16 | End: 2021-04-18

## 2021-04-16 RX ORDER — SODIUM CHLORIDE 9 MG/ML
250 INJECTION INTRAMUSCULAR; INTRAVENOUS; SUBCUTANEOUS ONCE
Refills: 0 | Status: DISCONTINUED | OUTPATIENT
Start: 2021-04-16 | End: 2021-04-16

## 2021-04-16 RX ORDER — GABAPENTIN 400 MG/1
100 CAPSULE ORAL ONCE
Refills: 0 | Status: COMPLETED | OUTPATIENT
Start: 2021-04-16 | End: 2021-04-16

## 2021-04-16 RX ORDER — HEPARIN SODIUM 5000 [USP'U]/ML
700 INJECTION INTRAVENOUS; SUBCUTANEOUS
Qty: 25000 | Refills: 0 | Status: DISCONTINUED | OUTPATIENT
Start: 2021-04-16 | End: 2021-04-18

## 2021-04-16 RX ORDER — ACETAMINOPHEN 500 MG
975 TABLET ORAL ONCE
Refills: 0 | Status: COMPLETED | OUTPATIENT
Start: 2021-04-16 | End: 2021-04-16

## 2021-04-16 RX ADMIN — Medication 650 MILLIGRAM(S): at 23:24

## 2021-04-16 RX ADMIN — GABAPENTIN 100 MILLIGRAM(S): 400 CAPSULE ORAL at 13:06

## 2021-04-16 RX ADMIN — HYDROMORPHONE HYDROCHLORIDE 30 MILLILITER(S): 2 INJECTION INTRAMUSCULAR; INTRAVENOUS; SUBCUTANEOUS at 16:50

## 2021-04-16 RX ADMIN — Medication 975 MILLIGRAM(S): at 13:06

## 2021-04-16 RX ADMIN — HYDROMORPHONE HYDROCHLORIDE 30 MILLILITER(S): 2 INJECTION INTRAMUSCULAR; INTRAVENOUS; SUBCUTANEOUS at 19:24

## 2021-04-16 RX ADMIN — CITALOPRAM 10 MILLIGRAM(S): 10 TABLET, FILM COATED ORAL at 12:15

## 2021-04-16 RX ADMIN — HEPARIN SODIUM 7 UNIT(S)/HR: 5000 INJECTION INTRAVENOUS; SUBCUTANEOUS at 23:00

## 2021-04-16 RX ADMIN — Medication 650 MILLIGRAM(S): at 23:54

## 2021-04-16 RX ADMIN — AZATHIOPRINE 150 MILLIGRAM(S): 100 TABLET ORAL at 12:15

## 2021-04-16 RX ADMIN — Medication 650 MILLIGRAM(S): at 17:40

## 2021-04-16 RX ADMIN — SIMVASTATIN 10 MILLIGRAM(S): 20 TABLET, FILM COATED ORAL at 23:24

## 2021-04-16 RX ADMIN — Medication 650 MILLIGRAM(S): at 19:24

## 2021-04-16 RX ADMIN — Medication 975 MILLIGRAM(S): at 16:49

## 2021-04-16 NOTE — PROGRESS NOTE ADULT - SUBJECTIVE AND OBJECTIVE BOX
SUBJECTIVE / OVERNIGHT EVENTS:  No events.  Feel well.  no complaints.   no cp, no sob, no n/v/d.  no abd pain.   await OR for VAT today        --------------------------------------------------------------------------------------------  LABS:                        13.5   5.85  )-----------( 160      ( 16 Apr 2021 06:29 )             40.6     04-16    134<L>  |  100  |  15  ----------------------------<  107<H>  4.9   |  23  |  0.91    Ca    9.1      16 Apr 2021 06:29      PT/INR - ( 16 Apr 2021 06:29 )   PT: 12.6 sec;   INR: 1.10 ratio         PTT - ( 16 Apr 2021 06:29 )  PTT:83.9 sec  CAPILLARY BLOOD GLUCOSE                RADIOLOGY & ADDITIONAL TESTS:    Imaging Personally Reviewed:  [x] YES  [ ] NO    Consultant(s) Notes Reviewed:  [x] YES  [ ] NO    MEDICATIONS  (STANDING):  acetaminophen   Tablet .. 650 milliGRAM(s) Oral every 6 hours  azaTHIOprine 150 milliGRAM(s) Oral daily  citalopram 10 milliGRAM(s) Oral daily  heparin  Infusion 700 Unit(s)/Hr (7 mL/Hr) IV Continuous <Continuous>  HYDROmorphone PCA (1 mG/mL) 30 milliLiter(s) PCA Continuous PCA Continuous  lactated ringers. 1000 milliLiter(s) (30 mL/Hr) IV Continuous <Continuous>  polyethylene glycol 3350 17 Gram(s) Oral daily  simvastatin 10 milliGRAM(s) Oral at bedtime    MEDICATIONS  (PRN):  diphenhydrAMINE   Injectable 25 milliGRAM(s) IV Push every 4 hours PRN Pruritus  HYDROmorphone PCA (1 mG/mL) Rescue Clinician Bolus 0.5 milliGRAM(s) IV Push every 15 minutes PRN for Pain Scale GREATER THAN 6  naloxone Injectable 0.1 milliGRAM(s) IV Push every 3 minutes PRN For ANY of the following changes in patient status:  A. RR LESS THAN 10 breaths per minute, B. Oxygen saturation LESS THAN 90%, C. Sedation score of 6  ondansetron Injectable 4 milliGRAM(s) IV Push every 6 hours PRN Nausea      Care Discussed with Consultants/Other Providers [x] YES  [ ] NO    Vital Signs Last 24 Hrs  T(C): 36.4 (16 Apr 2021 20:00), Max: 37.7 (15 Apr 2021 22:03)  T(F): 97.5 (16 Apr 2021 20:00), Max: 99.9 (15 Apr 2021 22:03)  HR: 63 (16 Apr 2021 20:00) (63 - 97)  BP: 123/53 (16 Apr 2021 20:00) (120/48 - 152/58)  BP(mean): 68 (16 Apr 2021 20:00) (62 - 69)  RR: 16 (16 Apr 2021 20:00) (15 - 23)  SpO2: 95% (16 Apr 2021 20:00) (91% - 96%)  I&O's Summary    15 Apr 2021 07:01  -  16 Apr 2021 07:00  --------------------------------------------------------  IN: 250 mL / OUT: 700 mL / NET: -450 mL      PHYSICAL EXAM:  GENERAL: NAD, well-developed, comfortable  HEAD:  Atraumatic, Normocephalic  EYES: EOMI, PERRLA, conjunctiva and sclera clear  NECK: Supple, No JVD  CHEST/LUNG: mild decrease breath sounds bilaterally; No wheeze   HEART: Regular rate and rhythm; No murmurs, rubs, or gallops  ABDOMEN: Soft, Nontender, Nondistended; Bowel sounds present  Neuro: AAOx3, no focal weakness, 5/5 b/l extremity strength  EXTREMITIES:  2+ Peripheral Pulses, No clubbing, cyanosis, or edema  SKIN: No rashes or lesions

## 2021-04-16 NOTE — PROGRESS NOTE ADULT - SUBJECTIVE AND OBJECTIVE BOX
[facility], Division of Infectious Diseases  ANDREW Rodríguez Y. Patel, S. Shah  [phone]    Name: BETTIE PRATER  Age: 80y  Gender: Female  MRN: [id number]  Note Date: 04-16-21    Interval History:  No acute overnight events.   Notes reviewed  Planned for VATS/lung resection today    Antibiotics:      Medications:  acetaminophen   Tablet .. 975 milliGRAM(s) Oral once  azaTHIOprine 150 milliGRAM(s) Oral daily  citalopram 10 milliGRAM(s) Oral daily  gabapentin 100 milliGRAM(s) Oral once  polyethylene glycol 3350 17 Gram(s) Oral daily  simvastatin 10 milliGRAM(s) Oral at bedtime      Review of Systems:  A 10-point review of systems was obtained.     Pertinent positives and negatives--  Constitutional: No fevers. No Chills. No Rigors.   Cardiovascular: No chest pain. No palpitations.  Respiratory: No shortness of breath. No cough.  Gastrointestinal: No nausea or vomiting. No diarrhea or constipation.   Psychiatric: Pleasant. Appropriate affect.    Review of systems otherwise negative except as previously noted.    Allergies: No Known Allergies    For details regarding the patient's past medical history, social history, family history, and other miscellaneous elements, please refer the initial infectious diseases consultation and/or the admitting history and physical examination for this admission.    Objective:  Vitals:   T(C): 37.2 (04-16-21 @ 05:47), Max: 37.7 (04-15-21 @ 22:03)  HR: 94 (04-16-21 @ 05:47) (90 - 97)  BP: 143/74 (04-16-21 @ 05:47) (120/70 - 149/66)  RR: 17 (04-16-21 @ 05:47) (16 - 18)  SpO2: 95% (04-16-21 @ 05:47) (94% - 95%)    Physical Examination:  General: no acute distress  HEENT: NC/AT, EOMI, anicteric, no oral lesions  Neck: supple, no palpable LAD  Cardio: S1, S2 heard, RRR, no murmurs  Resp: breath sounds heard bilaterally, no rales, wheezes or rhonchi  Abd: soft, NT, ND, + bowel sounds  Ext: no edema or cyanosis  Skin: warm, dry, no visible rash      Laboratory Studies:  CBC:                       13.5   5.85  )-----------( 160      ( 16 Apr 2021 06:29 )             40.6     CMP: 04-16    134<L>  |  100  |  15  ----------------------------<  107<H>  4.9   |  23  |  0.91    Ca    9.1      16 Apr 2021 06:29          Microbiology: reviewed      Radiology: reviewed

## 2021-04-16 NOTE — PROGRESS NOTE ADULT - SUBJECTIVE AND OBJECTIVE BOX
PROCEDURE: Left VATS, Left upper lobe wedge resection 16-Apr-2021       ISSUES:   Lung mass  Post op pain  Chest tube in place  Acute PE (RML RLL Pulm arteries)  Bilateral DVT (Peroneal and Tibia)  ANCA Vasculitis  HTN      INTERVAL EVENTS:   OR today. Extubated in OR. Transferred to CTICU.      HISTORY:   Patient reports moderate pain at chest wall incision sites which is worse with coughing and deep breathing without associated fever or dyspnea. Pain is improved with use of pain meds.     PHYSICAL EXAM:   Gen: Comfortable, No acute distress  Eyes: Sclera white, Conjunctiva normal, Eyelids normal, Pupils symmetrical   ENT: Mucous membranes moist,  ,  ,    Neck: Trachea midline,  ,  ,  ,  ,    CV: Rate regular, Rhythm regular,  ,  ,    Resp: Breath sounds clear, No accessory muscles use, L chest tube in place,    Abd: Soft, Non-distended, Non-tender, Bowel sounds normal,  ,  ,    Skin: Warm, No peripheral edema of lower extremities,  ,    : No lopez  Neuro: Moving all 4 extremities,    Psych: A&Ox3      ASSESSMENT AND PLAN:     NEURO:  Post-operative Pain - Pain control with PCA and Tylenol IV PRN.        RESPIRATORY:  Hypoxia - Wean nasal cannula for goal O2sat above 92. Obtain CXR. Incentive spirometry. Chest PT and frequent suctioning. Continue bronchodilators. OOB to chair & ambulate w/ assistance. Continuous pulse oximetry for support & to prevent decompensation.       Chest tube – Pleurevac water seal. Monitor chest tube output.           Acute Pulmonary Embolism and Bilateral peroneal DVT - stable. heparin gtt         CARDIOVASCULAR:  Hemodynamically stable - Not on pressors. Continue hemodynamic monitoring.  HTN - stable. Hold home antihypertensives for now.        ANCA vasculitis - Stable. Continue azathioprine 150mg qday.             RENAL:  Stable - Monitor IOs and electrolytes. Keep K above 4.0 and Mg above 2.0.     R renal infarct: CT showing possible R renal infarct, No complaints of R flank pain or urinary symptoms, Renal US IMPRESSION: Hypoechoic lesion in the upper pole of right kidney which corresponds to the cystic lesion identified on recent CT. This may represent a complex cyst or a solid renal mass and should be further characterized with a contrast-enhanced MRI on outpatient basis.      GASTROINTESTINAL:  GI prophylaxis not indicated  Zofran and Reglan IV PRN for nausea  Regular consistency diet            HEMATOLOGIC:  No signs of active bleeding. Monitor Hgb in CBC in AM  On therapeutic anticoagulation.         INFECTIOUS DISEASE:  All surgical sites appear clean. No signs of active infection. Will monitor for fever and leukocytosis.          ENDOCRINE:  Stable – Monitor glucose fingersticks for goal 120-180.            Pertinent clinical, laboratory, radiographic, hemodynamic, echocardiographic, respiratory data, microbiologic data and chart were reviewed by myself and analyzed frequently throughout the course of the day and night by myself.    Plan discussed at length with the CTICU staff and Attending CT Surgeon.     Patient's status was discussed with patient at bedside.         _________________________  VITAL SIGNS:  Vital Signs Last 24 Hrs  T(C): 37 (16 Apr 2021 17:00), Max: 37.7 (15 Apr 2021 22:03)  T(F): 98.6 (16 Apr 2021 17:00), Max: 99.9 (15 Apr 2021 22:03)  HR: 69 (16 Apr 2021 19:00) (65 - 97)  BP: 130/48 (16 Apr 2021 19:00) (120/48 - 152/58)  BP(mean): 69 (16 Apr 2021 19:00) (62 - 69)  RR: 15 (16 Apr 2021 19:00) (15 - 23)  SpO2: 96% (16 Apr 2021 19:00) (91% - 96%)  I/Os:   I&O's Detail    15 Apr 2021 07:01  -  16 Apr 2021 07:00  --------------------------------------------------------  IN:    Oral Fluid: 250 mL  Total IN: 250 mL    OUT:    Voided (mL): 700 mL  Total OUT: 700 mL    Total NET: -450 mL              MEDICATIONS:  MEDICATIONS  (STANDING):  acetaminophen   Tablet .. 650 milliGRAM(s) Oral every 6 hours  azaTHIOprine 150 milliGRAM(s) Oral daily  citalopram 10 milliGRAM(s) Oral daily  heparin  Infusion 700 Unit(s)/Hr (7 mL/Hr) IV Continuous <Continuous>  HYDROmorphone PCA (1 mG/mL) 30 milliLiter(s) PCA Continuous PCA Continuous  lactated ringers. 1000 milliLiter(s) (30 mL/Hr) IV Continuous <Continuous>  polyethylene glycol 3350 17 Gram(s) Oral daily  simvastatin 10 milliGRAM(s) Oral at bedtime    MEDICATIONS  (PRN):  diphenhydrAMINE   Injectable 25 milliGRAM(s) IV Push every 4 hours PRN Pruritus  HYDROmorphone PCA (1 mG/mL) Rescue Clinician Bolus 0.5 milliGRAM(s) IV Push every 15 minutes PRN for Pain Scale GREATER THAN 6  naloxone Injectable 0.1 milliGRAM(s) IV Push every 3 minutes PRN For ANY of the following changes in patient status:  A. RR LESS THAN 10 breaths per minute, B. Oxygen saturation LESS THAN 90%, C. Sedation score of 6  ondansetron Injectable 4 milliGRAM(s) IV Push every 6 hours PRN Nausea      LABS:                        13.5   5.85  )-----------( 160      ( 16 Apr 2021 06:29 )             40.6     04-16    134<L>  |  100  |  15  ----------------------------<  107<H>  4.9   |  23  |  0.91    Ca    9.1      16 Apr 2021 06:29        PT/INR - ( 16 Apr 2021 06:29 )   PT: 12.6 sec;   INR: 1.10 ratio         PTT - ( 16 Apr 2021 06:29 )  PTT:83.9 sec      _________________________        ________________________________________________

## 2021-04-16 NOTE — CONSULT NOTE ADULT - SUBJECTIVE AND OBJECTIVE BOX
Jose RafaelBETTIE DE LA ROSAMontour FallsDILMA  [id number]    HISTORY OF PRESENT ILLNESS:  Patient is a 80 year old female with PMHx of HTN, ANCA vasculitis, non smoker, presents with acute 9/10 stabbing, non-radiating chest pain with exertional shortness of breath. Not associated with n/v and improved when laying down, patient was seen in the urgent care and had EKG showing TWI in the lateral and anterior leads. Patient reported recent travel to [place] in car but no LE edema or hx blood clots.    Interval hx:  Pt had a CT angio of chest which showed RLL, RML PE, LE doppler with b/l below the knee DVT, CTA also revealed Bilateral masslike and nodular consolidations measuring up to 8.6 cm, Mediastinal lymphadenopathy.2 x 3 cm right lower lobe paraspinal chronic cyst, right kidney ? mass or cyst  and large b/l lung masses. CT surg consulted and patient getting VATS w/lung resection today    PAST MEDICAL & SURGICAL HISTORY:  Vasculitis    ANCA-associated vasculitis    No significant past surgical history    Review of Systems:  Gen:  No fevers/chills, weight loss  HEENT: No blurry vision, no difficulty swallowing  CVS: No chest pain/palpitations  Resp: No SOB/wheezing  GI: No N/V/C/D/abdominal pain  MSK:  Skin: No new rashes  Neuro: No headaches    MEDICATIONS  (STANDING):  azaTHIOprine 150 milliGRAM(s) Oral daily  citalopram 10 milliGRAM(s) Oral daily  polyethylene glycol 3350 17 Gram(s) Oral daily  simvastatin 10 milliGRAM(s) Oral at bedtime    MEDICATIONS  (PRN):      Allergies    codeine (Nausea)  penicillin (Hives)    Intolerances        PERTINENT MEDICATION HISTORY:    SOCIAL HISTORY:  OCCUPATION:  TRAVEL HISTORY:    FAMILY HISTORY:      Vital Signs Last 24 Hrs  T(C): 37.4 (16 Apr 2021 13:06), Max: 37.7 (15 Apr 2021 22:03)  T(F): 99.4 (16 Apr 2021 13:06), Max: 99.9 (15 Apr 2021 22:03)  HR: 92 (16 Apr 2021 13:06) (92 - 97)  BP: 152/58 (16 Apr 2021 13:06) (120/70 - 152/58)  BP(mean): --  RR: 17 (16 Apr 2021 13:06) (16 - 18)  SpO2: 94% (16 Apr 2021 13:06) (94% - 95%)    Physical Exam:  General: No apparent distress  HEENT: EOMI, MMM  CVS: +S1/S2, RRR, no murmurs/rubs/gallops  Resp: CTA b/l. No crackles/wheezing  GI: Soft, NT/ND +BS  MSK:  Shoulders: wnl  Elbows: wnl  Wrists: wnl  MCPs: wnl  PIPs: wnl  DIPs: wnl   Hips: wnl  Knees: wnl   Ankle: wnl  Neuro: AAOx3  Skin: no visible rashes    LABS:                        13.5   5.85  )-----------( 160      ( 16 Apr 2021 06:29 )             40.6     04-16    134<L>  |  100  |  15  ----------------------------<  107<H>  4.9   |  23  |  0.91    Ca    9.1      16 Apr 2021 06:29      PT/INR - ( 16 Apr 2021 06:29 )   PT: 12.6 sec;   INR: 1.10 ratio         PTT - ( 16 Apr 2021 06:29 )  PTT:83.9 sec      RADIOLOGY & ADDITIONAL STUDIES:    < from: CT Angio Chest w/ IV Cont (04.13.21 @ 15:53) >  IMPRESSION: Right lower and middle lobe pulmonary arterial emboli as described above.    Bilateral masslike and nodular consolidations measuring up to 8.6 cm as described above. Differential diagnosis include infectious/inflammatory or neoplastic etiologies. A 1 month follow-up noncontrast chest CT is recommended for complete evaluation.    Mediastinal lymphadenopathy.    2 x 3 cm right lower lobe paraspinal chronic cyst with mild peripheral wall thickening can be monitored on the follow-up chest CT.    Rounded area of decreased enhancement involving the upper pole of the partially imaged right kidney. Differential diagnosis include renal infarct or renal neoplasm. Dedicated contrast-enhanced abdominal CT is recommended for complete evaluation.    < end of copied text >

## 2021-04-16 NOTE — PROGRESS NOTE ADULT - SUBJECTIVE AND OBJECTIVE BOX
Date of Service: 04-16-21 @ 11:19    Patient is a 80y old  Female who presents with a chief complaint of chest pain (16 Apr 2021 11:10)      Any change in ROS:   Doing ok : no SOB :     MEDICATIONS  (STANDING):  acetaminophen   Tablet .. 975 milliGRAM(s) Oral once  azaTHIOprine 150 milliGRAM(s) Oral daily  citalopram 10 milliGRAM(s) Oral daily  gabapentin 100 milliGRAM(s) Oral once  polyethylene glycol 3350 17 Gram(s) Oral daily  simvastatin 10 milliGRAM(s) Oral at bedtime    MEDICATIONS  (PRN):    Vital Signs Last 24 Hrs  T(C): 36.8 (16 Apr 2021 10:55), Max: 37.7 (15 Apr 2021 22:03)  T(F): 98.2 (16 Apr 2021 10:55), Max: 99.9 (15 Apr 2021 22:03)  HR: 92 (16 Apr 2021 10:55) (90 - 97)  BP: 151/65 (16 Apr 2021 10:55) (120/70 - 151/65)  BP(mean): --  RR: 16 (16 Apr 2021 10:55) (16 - 18)  SpO2: 94% (16 Apr 2021 10:55) (94% - 95%)    I&O's Summary    15 Apr 2021 07:01  -  16 Apr 2021 07:00  --------------------------------------------------------  IN: 250 mL / OUT: 700 mL / NET: -450 mL          Physical Exam:   GENERAL: NAD, well-groomed, well-developed  HEENT: RANJIT/   Atraumatic, Normocephalic  ENMT: No tonsillar erythema, exudates, or enlargement; Moist mucous membranes, Good dentition, No lesions  NECK: Supple, No JVD, Normal thyroid  CHEST/LUNG: Clear to auscultaion  CVS: Regular rate and rhythm; No murmurs, rubs, or gallops  GI: : Soft, Nontender, Nondistended; Bowel sounds present  NERVOUS SYSTEM:  Alert & Oriented X3  EXTREMITIES:  - edema  LYMPH: No lymphadenopathy noted  SKIN: No rashes or lesions  ENDOCRINOLOGY: No Thyromegaly  PSYCH: Appropriate    Labs:                              13.5   5.85  )-----------( 160      ( 16 Apr 2021 06:29 )             40.6                         13.9   6.08  )-----------( 166      ( 15 Apr 2021 22:16 )             39.3                         13.6   5.74  )-----------( 165      ( 15 Apr 2021 07:23 )             39.9                         14.0   5.74  )-----------( 177      ( 14 Apr 2021 07:35 )             40.7                         12.8   5.82  )-----------( 182      ( 13 Apr 2021 13:36 )             37.3     04-16    134<L>  |  100  |  15  ----------------------------<  107<H>  4.9   |  23  |  0.91  04-15    134<L>  |  100  |  15  ----------------------------<  104<H>  4.0   |  22  |  0.78  04-13    138  |  102  |  22  ----------------------------<  96  4.0   |  24  |  0.86    Ca    9.1      16 Apr 2021 06:29  Ca    8.8      15 Apr 2021 07:23    TPro  6.7  /  Alb  3.4  /  TBili  0.8  /  DBili  x   /  AST  33<H>  /  ALT  19  /  AlkPhos  42  04-13    CAPILLARY BLOOD GLUCOSE            PT/INR - ( 16 Apr 2021 06:29 )   PT: 12.6 sec;   INR: 1.10 ratio         PTT - ( 16 Apr 2021 06:29 )  PTT:83.9 sec    Procalcitonin, Serum: 0.07 ng/mL (04-13 @ 16:37)  Serum Pro-Brain Natriuretic Peptide: 246 pg/mL (04-13 @ 13:36)        RECENT CULTURES:    r< from: US Duplex Venous Lower Ext Complete, Bilateral (04.14.21 @ 15:27) >    PROCEDURE DATE:  Apr 14 2021         INTERPRETATION:  CLINICAL INFORMATION: Patient on heparin, pulmonary emboli.    COMPARISON: None available.    TECHNIQUE: Duplex sonography of the BILATERAL LOWER extremity veins with color and spectral Doppler, with and without compression.    FINDINGS:    RIGHT:  Normal compressibility of the RIGHT common femoral, femoral and popliteal veins.  Doppler examination shows normal spontaneous and phasic flow.  Posterior tibial and peroneal veins demonstrate lack of color Doppler flow and are incompressible.    LEFT:  Normal compressibility of the LEFT common femoral, femoral and popliteal veins.  Doppler examination shows normal spontaneous and phasic flow.  Posterior tibial and peroneal veins demonstrate lack of color Doppler flow and are incompressible.    IMPRESSION:  Bilateral below the knee deep venous thrombosis.  Findings discussed with DEEPA Chong at 5:00 PM on 4/14/2021 with read back.                MASTER BOGGS MD; Attending Radiologist  This document has been electronically signed. Apr 14 2021  4:59PM    < end of copied text >      RESPIRATORY CULTURES:          Studies  Chest X-RAY  CT SCAN Chest   Venous Dopplers: LE:   CT Abdomen  Others

## 2021-04-16 NOTE — PROGRESS NOTE ADULT - SUBJECTIVE AND OBJECTIVE BOX
CARDIOLOGY FOLLOW UP - Dr. Hooker  DATE OF SERVICE: 04-16-21     CC no cp/sob  anxious about upcoming procedure.     REVIEW OF SYSTEMS:   CONSTITUTIONAL: No fever, weight loss, or fatigue   RESPIRATORY:  No cough, wheezing, chills or hemoptysis; No SOB  CARDIOVASCULAR: No chest pain, palpitations, passing out, dizziness, or leg swelling   GASTROINTESTINAL: No abdominal or epigastric pain. No nausea, vomiting, or hematemesis, no diarreha, or constipation, No melena or hematochezia   VASCULAR: no edema.       PHYSICAL EXAM:  T(C): 36.8 (04-16-21 @ 10:55), Max: 37.7 (04-15-21 @ 22:03)  HR: 92 (04-16-21 @ 10:55) (90 - 97)  BP: 151/65 (04-16-21 @ 10:55) (120/70 - 151/65)  RR: 16 (04-16-21 @ 10:55) (16 - 18)  SpO2: 94% (04-16-21 @ 10:55) (94% - 95%)  Wt(kg): --  I&O's Summary    15 Apr 2021 07:01  -  16 Apr 2021 07:00  --------------------------------------------------------  IN: 250 mL / OUT: 700 mL / NET: -450 mL        Appearance: Normal	  Cardiovascular: Normal S1 S2,RRR, No JVD, No murmurs  Respiratory: Lungs clear to auscultation	  Gastrointestinal:  Soft, Non-tender, + BS	  Extremities: Normal range of motion, No clubbing, cyanosis or edema      HOME MEDICATIONS:      MEDICATIONS  (STANDING):  acetaminophen   Tablet .. 975 milliGRAM(s) Oral once  azaTHIOprine 150 milliGRAM(s) Oral daily  citalopram 10 milliGRAM(s) Oral daily  gabapentin 100 milliGRAM(s) Oral once  polyethylene glycol 3350 17 Gram(s) Oral daily  simvastatin 10 milliGRAM(s) Oral at bedtime      TELEMETRY: 	    ECG:  	  RADIOLOGY:   DIAGNOSTIC TESTING:  [ ] Echocardiogram:  [ ]  Catheterization:  [ ] Stress Test:    OTHER: 	    LABS:	 	                                13.5   5.85  )-----------( 160      ( 16 Apr 2021 06:29 )             40.6     04-16    134<L>  |  100  |  15  ----------------------------<  107<H>  4.9   |  23  |  0.91    Ca    9.1      16 Apr 2021 06:29      PT/INR - ( 16 Apr 2021 06:29 )   PT: 12.6 sec;   INR: 1.10 ratio         PTT - ( 16 Apr 2021 06:29 )  PTT:83.9 sec

## 2021-04-16 NOTE — CHART NOTE - NSCHARTNOTEFT_GEN_A_CORE
Rheumatology consulted given reported history of ANCA vasculitis now with masslike lesions in chest and abdomen. Unable to see the patient today she went for VATS.  Full consult to follow by Rheumatology on call this weekend.

## 2021-04-16 NOTE — PROGRESS NOTE ADULT - ASSESSMENT
80 year old female with PMHx of HTN, ANCA vasculitis (dx 20 years ago, been on Azathioprine since then, being followed by Rheum outpt), non smoker, presents with acute 9/10 stabbing, non-radiating chest pain lasting 10-15 mins. with exertional shortness of breath. EKG showing TWI in the lateral and anterior leads. Patient reported recent travel to [place] in car (~10 hrs ride).     # Acute pulmonary embolus, first episode:  Prolonged car ride and recent sedentary life style during pandemic. no family hx.  CTa chest reviewed. large pulm masses b/l noted: infectious vs. inflammatory.   Pulm Consulted. Will tentatively plan for biopsy. VATs 4/16/21. ID eval per pulm. check TTE, LE dopplers.   She is medically optimized for procedure.     # hx ANCA vasculitis:   Chronic, stable, Cont home medication regime Azathioprine 150MG daily  rheum eval    # upper pole of right kidney: complex cyst? outpt MRI. also biopsy of lung mass as above.    # HTN: monitor BP    Physical therapy. Out of bed to chair with assistance. monitor O2 status.   # DVT ppx:  On Hep gtt

## 2021-04-16 NOTE — PROGRESS NOTE ADULT - ASSESSMENT
80 year old female with PMHx of HTN, ANCA vasculitis, Former smoker, presents with acute 9/10 stabbing, non-radiating chest pain with exertional shortness of breath with twi on ecg;  CTa revealed acute pe     1. Atypical chest pain   -in the setting of acute PE/ lung mass   -Ecg with twi noted to anterolateral leads  -HS trop neg  -ER echo with normal lv fxn  -echo pending    2. 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 mass or cyst  -pulm following , med f/u   -Echo pending  -hep gtt on hold for OR, resume post-op when ok with surgery team.      3. Lung Mass, Mediastinal lymphadenopathy  -CT Angio revealed large b/l pulmonary masses.  -plan for LVATS, Lung resection tomorrow  -CTS f/u   -patient denies significant cardiac history, no cp/sob/cui, no evidence of arrythmia or CHF on exam  -ER echo with normal lv fxn  -patent cardiac optimized and can proceed with LVATS, lung resection with acceptable cv risk.     4. ANCA Vasculitis  -med , id f/u

## 2021-04-16 NOTE — PROGRESS NOTE ADULT - ASSESSMENT
Pt is a 80W w/ PMHx of HTN, ANCA vasculitis, p/w with acute 9/10 stabbing, non-radiating chest pain with exertional shortness of breath. Not associated with n/v and improved when laying down, patient was seen in the urgent care and had EKG showing TWI in the lateral and anterior leads. Patient reported recent travel to [place] in car but no LE edema or hx blood clots.    B/l lung masses concerning for infection vs inflammation vs malignancy  CT imaging showing Bilateral masslike and nodular consolidations measuring up to 8.6 cm as described above. Differential diagnosis include infectious/inflammatory or neoplastic etiologies and mediastinal lymphadenopathy.  VATS/lung bx today--Please send for bacterial/fungal/AFB cx in addition to pathology/cytology  Fungal makers for additional evaluation pending  Low suspicion for PNA--However if pt w/ clinical worsening, possible post obstructive PNA; low threshold to start zosyn.    Acute PE  Chest Pain  CTA chest w/ Right lower and middle lobe pulmonary arterial emboli also w/ lung nodules  AC and additional management per primary team    ANCA Vasculitis  on Azathioprine 150MG daily  No hx of biologic use    Dr. Nayeli Nath covering the service this weekend.  Infectious Diseases will continue to follow. Please call with any questions.   Deonna Mclean M.D.  [facility], Division of Infectious Diseases [phone]  For over the weekend and after hours, please call [phone]

## 2021-04-16 NOTE — PROGRESS NOTE ADULT - ASSESSMENT
Patient is a 80 year old female with PMHx of HTN, ANCA vasculitis, non smoker, presents with acute 9/10 stabbing, non-radiating chest pain with exertional shortness of breath. Not associated with n/v and improved when laying down, patient was seen in the urgent care and had EKG showing TWI in the lateral and anterior leads. Patient reported recent travel to [place] in car but no LE edema or hx blood clots.    PE:  Lung masses  R renal infarct: CT showing possible R renal infarct, No complaints of R flank pain or urinary symptoms, Renal US IMPRESSION: Hypoechoic lesion in the upper pole of right kidney which corresponds to the cystic lesion identified on recent CT. This may represent a complex cyst or a solid renal mass and should be further characterized with a contrast-enhanced MRI on outpatient basis.  HTN:  ANCA Vasculitis:    4/14/2021:      PE: on heparin: get dopplers are postive and echo is still pending? noac ?  ANCA Vasculitis: on azathioprine: 150 mg per day : LFTs are normal   Lung masses: DW ROLAND: She has large masses on both sides and small nodule: RUL and LLL: may be exacerbation of vasculitis ?: core biopsy vs vats biopsy : get RHEUM involved and ID involved: ? fungal infection can happen: id consult: she does not look sick   No enlarged axillary lymph nodes. A few enlarged mediastinal lymph nodes with the largest in a precarinal location measuring about 1.3 cm.: dw roland: small : follow up   R renal infarct: CT showing possible R renal infarct, No complaints of R flank pain or urinary symptoms, Renal US IMPRESSION: Hypoechoic lesion in the upper pole of right kidney which corresponds to the cystic lesion identified on recent CT. This may represent a complex cyst or a solid renal mass and should be further characterized with a contrast-enhanced MRI on outpatient basis.  HTN: controlled  DW PMD     4/16:    PE: on heparin: for vats biopsy today   ANCA Vasculitis: on azathioprine: 150 mg per day : LFTs are normal  rheum consult pending  Lung masses: DW ROLAND: She has large masses on both sides and small nodule: RUL and LLL: may be exacerbation of vasculitis ?: core biopsy vs vats biopsy : get RHEUM involved and ID involved: ? fungal infection can happen: id consult: she does not look sick   No enlarged axillary lymph nodes. A few enlarged mediastinal lymph nodes with the largest in a precarinal location measuring about 1.3 cm.: dw rake: small : follow up   R renal infarct: CT showing possible R renal infarct, No complaints of R flank pain or urinary symptoms, Renal US IMPRESSION: Hypoechoic lesion in the upper pole of right kidney which corresponds to the cystic lesion identified on recent CT. This may represent a complex cyst or a solid renal mass and should be further characterized with a contrast-enhanced MRI on outpatient basis.  HTN: controlled  DW PMD

## 2021-04-17 LAB
ANION GAP SERPL CALC-SCNC: 15 MMOL/L — HIGH (ref 7–14)
APTT BLD: 60.4 SEC — HIGH (ref 27–36.3)
APTT BLD: 65.3 SEC — HIGH (ref 27–36.3)
APTT BLD: 71.2 SEC — HIGH (ref 27–36.3)
BUN SERPL-MCNC: 30 MG/DL — HIGH (ref 7–23)
CALCIUM SERPL-MCNC: 8.5 MG/DL — SIGNIFICANT CHANGE UP (ref 8.4–10.5)
CHLORIDE SERPL-SCNC: 102 MMOL/L — SIGNIFICANT CHANGE UP (ref 98–107)
CO2 SERPL-SCNC: 19 MMOL/L — LOW (ref 22–31)
COVID-19 SPIKE DOMAIN AB INTERP: POSITIVE
COVID-19 SPIKE DOMAIN ANTIBODY RESULT: 1.84 U/ML — HIGH
CREAT SERPL-MCNC: 1.2 MG/DL — SIGNIFICANT CHANGE UP (ref 0.5–1.3)
FUNGITELL: <31 PG/ML — SIGNIFICANT CHANGE UP
GLUCOSE SERPL-MCNC: 84 MG/DL — SIGNIFICANT CHANGE UP (ref 70–99)
H CAPSUL AG SPEC-ACNC: SIGNIFICANT CHANGE UP
H CAPSUL AG UR QL IA: SIGNIFICANT CHANGE UP
HCT VFR BLD CALC: 39.7 % — SIGNIFICANT CHANGE UP (ref 34.5–45)
HEPARIN-PF4 AB RESULT: <0.6 U/ML — SIGNIFICANT CHANGE UP (ref 0–0.9)
HGB BLD-MCNC: 12.9 G/DL — SIGNIFICANT CHANGE UP (ref 11.5–15.5)
INR BLD: 1.05 RATIO — SIGNIFICANT CHANGE UP (ref 0.88–1.16)
MAGNESIUM SERPL-MCNC: 2.2 MG/DL — SIGNIFICANT CHANGE UP (ref 1.6–2.6)
MCHC RBC-ENTMCNC: 32.5 GM/DL — SIGNIFICANT CHANGE UP (ref 32–36)
MCHC RBC-ENTMCNC: 34.8 PG — HIGH (ref 27–34)
MCV RBC AUTO: 107 FL — HIGH (ref 80–100)
NIGHT BLUE STAIN TISS: SIGNIFICANT CHANGE UP
NRBC # BLD: 0 /100 WBCS — SIGNIFICANT CHANGE UP
NRBC # FLD: 0 K/UL — SIGNIFICANT CHANGE UP
PF4 HEPARIN CMPLX AB SER-ACNC: NEGATIVE — SIGNIFICANT CHANGE UP
PHOSPHATE SERPL-MCNC: 6.5 MG/DL — HIGH (ref 2.5–4.5)
PLATELET # BLD AUTO: 143 K/UL — LOW (ref 150–400)
POTASSIUM SERPL-MCNC: 4.5 MMOL/L — SIGNIFICANT CHANGE UP (ref 3.5–5.3)
POTASSIUM SERPL-SCNC: 4.5 MMOL/L — SIGNIFICANT CHANGE UP (ref 3.5–5.3)
PROTHROM AB SERPL-ACNC: 12.1 SEC — SIGNIFICANT CHANGE UP (ref 10.6–13.6)
RBC # BLD: 3.71 M/UL — LOW (ref 3.8–5.2)
RBC # FLD: 14.8 % — HIGH (ref 10.3–14.5)
SARS-COV-2 IGG+IGM SERPL QL IA: 1.84 U/ML — HIGH
SARS-COV-2 IGG+IGM SERPL QL IA: POSITIVE
SODIUM SERPL-SCNC: 136 MMOL/L — SIGNIFICANT CHANGE UP (ref 135–145)
SPECIMEN SOURCE: SIGNIFICANT CHANGE UP
WBC # BLD: 6.11 K/UL — SIGNIFICANT CHANGE UP (ref 3.8–10.5)
WBC # FLD AUTO: 6.11 K/UL — SIGNIFICANT CHANGE UP (ref 3.8–10.5)

## 2021-04-17 PROCEDURE — 99223 1ST HOSP IP/OBS HIGH 75: CPT | Mod: GC

## 2021-04-17 PROCEDURE — 99233 SBSQ HOSP IP/OBS HIGH 50: CPT

## 2021-04-17 PROCEDURE — 71045 X-RAY EXAM CHEST 1 VIEW: CPT | Mod: 26

## 2021-04-17 RX ORDER — LIDOCAINE 4 G/100G
1 CREAM TOPICAL EVERY 24 HOURS
Refills: 0 | Status: COMPLETED | OUTPATIENT
Start: 2021-04-17 | End: 2021-04-21

## 2021-04-17 RX ORDER — SODIUM CHLORIDE 9 MG/ML
250 INJECTION INTRAMUSCULAR; INTRAVENOUS; SUBCUTANEOUS ONCE
Refills: 0 | Status: COMPLETED | OUTPATIENT
Start: 2021-04-17 | End: 2021-04-17

## 2021-04-17 RX ORDER — SODIUM CHLORIDE 9 MG/ML
250 INJECTION, SOLUTION INTRAVENOUS ONCE
Refills: 0 | Status: COMPLETED | OUTPATIENT
Start: 2021-04-17 | End: 2021-04-17

## 2021-04-17 RX ADMIN — Medication 650 MILLIGRAM(S): at 18:29

## 2021-04-17 RX ADMIN — LIDOCAINE 1 PATCH: 4 CREAM TOPICAL at 12:00

## 2021-04-17 RX ADMIN — HYDROMORPHONE HYDROCHLORIDE 30 MILLILITER(S): 2 INJECTION INTRAMUSCULAR; INTRAVENOUS; SUBCUTANEOUS at 07:18

## 2021-04-17 RX ADMIN — SIMVASTATIN 10 MILLIGRAM(S): 20 TABLET, FILM COATED ORAL at 21:04

## 2021-04-17 RX ADMIN — Medication 650 MILLIGRAM(S): at 05:45

## 2021-04-17 RX ADMIN — POLYETHYLENE GLYCOL 3350 17 GRAM(S): 17 POWDER, FOR SOLUTION ORAL at 12:30

## 2021-04-17 RX ADMIN — SODIUM CHLORIDE 250 MILLILITER(S): 9 INJECTION, SOLUTION INTRAVENOUS at 12:15

## 2021-04-17 RX ADMIN — CITALOPRAM 10 MILLIGRAM(S): 10 TABLET, FILM COATED ORAL at 12:29

## 2021-04-17 RX ADMIN — SODIUM CHLORIDE 500 MILLILITER(S): 9 INJECTION INTRAMUSCULAR; INTRAVENOUS; SUBCUTANEOUS at 04:48

## 2021-04-17 RX ADMIN — LIDOCAINE 1 PATCH: 4 CREAM TOPICAL at 20:00

## 2021-04-17 RX ADMIN — AZATHIOPRINE 150 MILLIGRAM(S): 100 TABLET ORAL at 12:29

## 2021-04-17 RX ADMIN — Medication 650 MILLIGRAM(S): at 12:30

## 2021-04-17 RX ADMIN — Medication 650 MILLIGRAM(S): at 12:00

## 2021-04-17 RX ADMIN — Medication 650 MILLIGRAM(S): at 05:15

## 2021-04-17 NOTE — PHYSICAL THERAPY INITIAL EVALUATION ADULT - PRECAUTIONS/LIMITATIONS, REHAB EVAL
2L/min via NC/oxygen therapy device and L/min 2L/min via NC; history DVT and PE./oxygen therapy device and L/min

## 2021-04-17 NOTE — PHYSICAL THERAPY INITIAL EVALUATION ADULT - GENERAL OBSERVATIONS, REHAB EVAL
Pt found in CTICU; +02; +lopez; +telemetry monitor. Pt found in CTICU; +02; +lopez; +PCA pump; +CT x 1 on water seal; +telemetry monitor.

## 2021-04-17 NOTE — PROGRESS NOTE ADULT - SUBJECTIVE AND OBJECTIVE BOX
S:  Feels well.    O:    General:  AOx3, appears comfortable in no distress.  Neuro: no gross neurologic or CN deficits  Pulm: CTABL. Chest tube in place with adequate tidaling.  CV: s1s2 RRR no m/r/g  Abdominal: NT/ND, TTP  Extremities: no cyanosis, clubbing or edema  Derm: no significant skin rash noted      _________________________  VITAL SIGNS:  Vital Signs Last 24 Hrs  T(C): 36.4 (17 Apr 2021 08:00), Max: 37.4 (16 Apr 2021 13:06)  T(F): 97.6 (17 Apr 2021 08:00), Max: 99.4 (16 Apr 2021 13:06)  HR: 91 (17 Apr 2021 10:00) (63 - 92)  BP: 115/44 (17 Apr 2021 09:00) (97/78 - 152/58)  BP(mean): 60 (17 Apr 2021 09:00) (59 - 83)  RR: 17 (17 Apr 2021 10:00) (12 - 23)  SpO2: 97% (17 Apr 2021 10:00) (91% - 97%)  I/Os:   I&O's Detail    16 Apr 2021 07:01  -  17 Apr 2021 07:00  --------------------------------------------------------  IN:    Heparin: 63 mL    Lactated Ringers: 360 mL  Total IN: 423 mL    OUT:    Chest Tube (mL): 0 mL  Total OUT: 0 mL    Total NET: 423 mL      17 Apr 2021 07:01  -  17 Apr 2021 10:56  --------------------------------------------------------  IN:    Heparin: 28 mL    Lactated Ringers: 120 mL  Total IN: 148 mL    OUT:    Chest Tube (mL): 0 mL    Indwelling Catheter - Urethral (mL): 275 mL  Total OUT: 275 mL    Total NET: -127 mL              MEDICATIONS:  MEDICATIONS  (STANDING):  acetaminophen   Tablet .. 650 milliGRAM(s) Oral every 6 hours  azaTHIOprine 150 milliGRAM(s) Oral daily  citalopram 10 milliGRAM(s) Oral daily  heparin  Infusion 700 Unit(s)/Hr (7 mL/Hr) IV Continuous <Continuous>  HYDROmorphone PCA (1 mG/mL) 30 milliLiter(s) PCA Continuous PCA Continuous  lactated ringers. 1000 milliLiter(s) (30 mL/Hr) IV Continuous <Continuous>  lidocaine   Patch 1 Patch Transdermal every 24 hours  polyethylene glycol 3350 17 Gram(s) Oral daily  simvastatin 10 milliGRAM(s) Oral at bedtime    MEDICATIONS  (PRN):  diphenhydrAMINE   Injectable 25 milliGRAM(s) IV Push every 4 hours PRN Pruritus  HYDROmorphone PCA (1 mG/mL) Rescue Clinician Bolus 0.5 milliGRAM(s) IV Push every 15 minutes PRN for Pain Scale GREATER THAN 6  naloxone Injectable 0.1 milliGRAM(s) IV Push every 3 minutes PRN For ANY of the following changes in patient status:  A. RR LESS THAN 10 breaths per minute, B. Oxygen saturation LESS THAN 90%, C. Sedation score of 6  ondansetron Injectable 4 milliGRAM(s) IV Push every 6 hours PRN Nausea      LABS:                        12.9   6.11  )-----------( 143      ( 17 Apr 2021 05:55 )             39.7     04-17    136  |  102  |  30<H>  ----------------------------<  84  4.5   |  19<L>  |  1.20    Ca    8.5      17 Apr 2021 05:55  Phos  6.5     04-17  Mg     2.2     04-17        PT/INR - ( 17 Apr 2021 05:55 )   PT: 12.1 sec;   INR: 1.05 ratio         PTT - ( 17 Apr 2021 05:55 )  PTT:65.3 sec      _________________________    A/P:    LUIS wedge resection    Neuro- No issues  Pulmonary- Cont bronchodilators. Incentive spirometry. OOB to chair and walking as tolerated. Pain control. Monitor chest tube output. Chest tube to water seal.  CV- HD stable, euvolemic. Hold antihypertensives moody-op.  Abdomen- no issues. Cont bowel regimen. Advance diet as tolerated.  Renal- stable Cr. Trend.  Endocrine- no active issues, trend FSG.    DVT ppx- SQH    Pertinent clinical, laboratory, radiographic, hemodynamic, echocardiographic, respiratory data, microbiologic data and chart were reviewed and analyzed frequently throughout the course of the day and night  Patient seen, examined and plan discussed with CT Surgeon / CTICU team during rounds.    Status discussed with patient /  updated plan of care

## 2021-04-17 NOTE — CONSULT NOTE ADULT - ASSESSMENT
- hold azathioprine while workup is ongoing, as AZA may increase risk of tumor progression, and which should also be held due to concern for infection.   - add serum IgG4, serum ACE levels, Vitamin D 1,25 and 25-OH Vitamin D levels to the bloodwork.  - will follow up results of lung biopsy from 4/16.   - If malignancy is ruled out and biopsy reveals evidence of active vasculitis, patient would need re-induction therapy. Would send the following labs in AM in anticipation of this:  - Quantiferon TB test, Hepatitis panel, and Full T cell subsets (to check CD19% count)- these have been ordered for AM.     Discussed with Dr. 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 Rheumatology consulted for further evaluation given history of ANCA vasculitis.    Impression:  #bilateral lung masses- differential includes malignancy (concerning as this can provoke DVT/PE, and pt with concurrent R renal pole decreased enhancement which could represent malignancy as well), vs. progression of ANCA vasculitis (can present similarly and vasculitis itself can also cause hypercoagulable state), vs. other inflammatory process such as IgG4 disease (can present with mass like lesions) vs. sarcoidosis (given pt with mediastinal lymphadenopathy). The patient's lung biopsy path will help to differentiate between these etiologies. Infection is also considered.     - hold azathioprine while workup is ongoing, as AZA may increase risk of tumor progression, and which should also be held due to concern for infection.   - add serum IgG4, serum ACE levels, Vitamin D 1,25 and 25-OH Vitamin D levels to the bloodwork.  - will follow up results of lung biopsy from 4/16.   - If malignancy is ruled out and biopsy reveals evidence of active vasculitis, patient would need re-induction therapy. Would send the following labs in AM in anticipation of this:  - Quantiferon TB test, Hepatitis panel, and Full T cell subsets (to check CD19% count)- these have been ordered for AM.     Discussed with Dr. 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 Rheumatology consulted for further evaluation given history of ANCA vasculitis.    Impression:  #bilateral lung masses- differential includes malignancy (concerning as this can provoke DVT/PE, and pt with concurrent R renal pole decreased enhancement which could represent malignancy as well), vs. progression of ANCA vasculitis (can present similarly and vasculitis itself can also cause hypercoagulable state), vs. other inflammatory process such as IgG4 disease (can present with mass like lesions) vs. sarcoidosis (given pt with mediastinal lymphadenopathy). The patient's lung biopsy path will help to differentiate between these etiologies. Infection is also considered.     - hold azathioprine while workup is ongoing, as AZA may increase risk of tumor progression, and which should also be held due to concern for infection.   - add serum IgG4, serum ACE levels, Vitamin D 1,25 and 25-OH Vitamin D levels to the bloodwork.  - check serum ANCA with MPO and PR3  - will follow up results of lung biopsy from 4/16.   - If malignancy is ruled out and biopsy reveals evidence of active vasculitis, patient would need re-induction therapy. Would send the following labs in AM in anticipation of this:  - Quantiferon TB test, Hepatitis panel, and Full T cell subsets (to check CD19% count)- these have been ordered for AM.     Discussed with Dr. Jesús Galo MD PGY4  Rheumatology Fellow (on call this weekend)  Pager [phone]/ cell [phone]

## 2021-04-17 NOTE — PHYSICAL THERAPY INITIAL EVALUATION ADULT - PERTINENT HX OF CURRENT PROBLEM, REHAB EVAL
80 year old female with PMHx of HTN, ANCA vasculitis, non smoker, presents with acute 9/10 stabbing, non-radiating chest pain with exertional shortness of breath. Not associated with n/v and improved when laying down, patient was seen in the urgent care and had EKG showing TWI in the lateral and anterior leads. Patient reported recent travel to [place] in car but no LE edema or hx blood clots. 80 year old female with PMHx of HTN, ANCA vasculitis, non smoker, presents with acute 9/10 stabbing, non-radiating chest pain with exertional shortness of breath. Not associated with n/v and improved when laying down, patient was seen in the urgent care and had EKG showing TWI in the lateral and anterior leads. Patient reported recent travel to [place] in car but no LE edema or hx blood clots. 4/13 CT chest showed left lung mass and PE right lower and middle lobe. 4/14 US bilateral LE DVTs.

## 2021-04-17 NOTE — PROGRESS NOTE ADULT - ASSESSMENT
80 year old female with PMHx of HTN, ANCA vasculitis (dx 20 years ago, been on Azathioprine since then, being followed by Rheum outpt), non smoker, presents with acute 9/10 stabbing, non-radiating chest pain lasting 10-15 mins. with exertional shortness of breath. EKG showing TWI in the lateral and anterior leads. Patient reported recent travel to [place] in car (~10 hrs ride).     # Acute pulmonary embolus, first episode:  Prolonged car ride and recent sedentary life style during pandemic. no family hx.  CTa chest reviewed. large pulm masses b/l noted: infectious vs. inflammatory.   Pulm Consulted. s/p biopsy/VATs 4/16/21. prelimp likely malignancy, but will wait for final results  LE dopplers: +DVT.   TTE pending.   CT-ICU care appreciated. chest tube removed.     # hx ANCA vasculitis:   Chronic, stable, hold home medication regime Azathioprine 150MG daily while waiting for biopsy results.  can likely resume if no infection. rheum eval appreciated.     # upper pole of right kidney: complex cyst? outpt MRI. also biopsy of lung mass as above.    # HTN: monitor BP    Physical therapy. Out of bed to chair with assistance. monitor O2 status.   # DVT ppx: On Hep gtt  d/w the daughter Leeann and pt. states all info can go through Leeann, who will inform the rest of the famly members.

## 2021-04-17 NOTE — CONSULT NOTE ADULT - CONSULT REASON
Dr. Arias
hx ANCA vasculitis and new lung lesions
chest pain
vasculitis
possible lung biopsy
Lung nodules
PE

## 2021-04-17 NOTE — CONSULT NOTE ADULT - ATTENDING COMMENTS
patient with multiple pulmonary masses and PE  1. NPO after midnight  2. OR for wedge resection LUIS to establish the diagnosis  3. hold the Heparin at 6am  4. medical clearance
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   Follow up biopsy to determine etiology of masses (? malignancy, ANCA vasc vs infection_  stop azathioprine for now and pending pathology report will determine next medication.   follow up anca, mpo, prot 3  check IgG4  Quantiferon TB test, Hepatitis panel, and Full T cell subsets (to check CD19% count)- these have been ordered for AM as these would be needed prior to further IS therapy if warranted based on biopsy results    Lorrie Espinosa MD  [facility], Division of Rheumatology   [phone]

## 2021-04-17 NOTE — PROGRESS NOTE ADULT - SUBJECTIVE AND OBJECTIVE BOX
Anesthesia Pain Management Service    SUBJECTIVE: Patient is doing well with IV PCA and no significant problems reported.  Patient states IV PCA helps with her pain.    Pain Scale Score	At rest: _7/10__ 	With Activity: ___ 	[X ] Refer to charted pain scores    THERAPY:    [ ] IV PCA Morphine		[ ] 5 mg/mL	[ ] 1 mg/mL  [X ] IV PCA Hydromorphone	[ ] 5 mg/mL	[X ] 1 mg/mL  [ ] IV PCA Fentanyl		[ ] 50 micrograms/mL    Demand dose __0.2_ lockout __6_ (minutes) Continuous Rate _0__ Total: 3.2  mg used (in past 24 hours)      MEDICATIONS  (STANDING):  acetaminophen   Tablet .. 650 milliGRAM(s) Oral every 6 hours  azaTHIOprine 150 milliGRAM(s) Oral daily  citalopram 10 milliGRAM(s) Oral daily  heparin  Infusion 700 Unit(s)/Hr (7 mL/Hr) IV Continuous <Continuous>  HYDROmorphone PCA (1 mG/mL) 30 milliLiter(s) PCA Continuous PCA Continuous  lactated ringers. 1000 milliLiter(s) (30 mL/Hr) IV Continuous <Continuous>  lidocaine   Patch 1 Patch Transdermal every 24 hours  polyethylene glycol 3350 17 Gram(s) Oral daily  simvastatin 10 milliGRAM(s) Oral at bedtime    MEDICATIONS  (PRN):  diphenhydrAMINE   Injectable 25 milliGRAM(s) IV Push every 4 hours PRN Pruritus  HYDROmorphone PCA (1 mG/mL) Rescue Clinician Bolus 0.5 milliGRAM(s) IV Push every 15 minutes PRN for Pain Scale GREATER THAN 6  naloxone Injectable 0.1 milliGRAM(s) IV Push every 3 minutes PRN For ANY of the following changes in patient status:  A. RR LESS THAN 10 breaths per minute, B. Oxygen saturation LESS THAN 90%, C. Sedation score of 6  ondansetron Injectable 4 milliGRAM(s) IV Push every 6 hours PRN Nausea      OBJECTIVE:  Patient is sitting up in chair with CT x1.    Sedation Score:	[ X] Alert	 [ ] Drowsy 	[ ] Arousable	[ ] Asleep	[ ] Unresponsive    Side Effects:	[X ] None	[ ] Nausea	[ ] Vomiting	[ ] Pruritus  		[ ] Other:    Vital Signs Last 24 Hrs  T(C): 36.6 (17 Apr 2021 04:00), Max: 37.4 (16 Apr 2021 13:06)  T(F): 97.8 (17 Apr 2021 04:00), Max: 99.4 (16 Apr 2021 13:06)  HR: 73 (17 Apr 2021 07:00) (63 - 92)  BP: 97/78 (17 Apr 2021 07:00) (97/78 - 152/58)  BP(mean): 83 (17 Apr 2021 07:00) (59 - 83)  RR: 18 (17 Apr 2021 07:00) (12 - 23)  SpO2: 97% (17 Apr 2021 07:00) (91% - 97%)    ASSESSMENT/ PLAN    Therapy to  be:	[ X] Continue   [ ] Discontinued   [ ] Change to prn Analgesics    Documentation and Verification of current medications:   [X] Done	[ ] Not done, not elligible    Comments: Will continue with IV Dilaudid PCA. Added lidocaine patch.  Recommend non-opioid adjuvant analgesics to be used when possible and when allowed by primary surgical team.

## 2021-04-17 NOTE — PROGRESS NOTE ADULT - ASSESSMENT
80 year old female with PMHx of HTN, ANCA vasculitis, Former smoker, presents with acute 9/10 stabbing, non-radiating chest pain with exertional shortness of breath with twi on ecg;  CTa revealed acute pe     1. Atypical chest pain   -in the setting of acute PE/ lung mass   -Ecg with twi noted to anterolateral leads  -HS trop neg  -ER echo with normal lv fxn  -echo pending    2. 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 mass or cyst  -pulm following , med f/u   -Echo pending  -hep gtt     3. Lung Mass, Mediastinal lymphadenopathy  -CT Angio revealed large b/l pulmonary masses.  -s/p  LVATS, Lung resection   -CTS f/u     4. ANCA Vasculitis  -med , id f/u

## 2021-04-17 NOTE — CONSULT NOTE ADULT - SUBJECTIVE AND OBJECTIVE BOX
BETTIE ESTRELLACarson  [id number]    HISTORY OF PRESENT ILLNESS:    PAST MEDICAL & SURGICAL HISTORY:  Vasculitis    ANCA-associated vasculitis    No significant past surgical history        Review of Systems:  Gen:  No fevers/chills, weight loss  HEENT: No blurry vision, no difficulty swallowing  CVS: No chest pain/palpitations  Resp: No SOB/wheezing  GI: No N/V/C/D/abdominal pain  MSK:  Skin: No new rashes  Neuro: No headaches    MEDICATIONS  (STANDING):  acetaminophen   Tablet .. 650 milliGRAM(s) Oral every 6 hours  azaTHIOprine 150 milliGRAM(s) Oral daily  citalopram 10 milliGRAM(s) Oral daily  heparin  Infusion 700 Unit(s)/Hr (7 mL/Hr) IV Continuous <Continuous>  HYDROmorphone PCA (1 mG/mL) 30 milliLiter(s) PCA Continuous PCA Continuous  lactated ringers. 1000 milliLiter(s) (30 mL/Hr) IV Continuous <Continuous>  lidocaine   Patch 1 Patch Transdermal every 24 hours  polyethylene glycol 3350 17 Gram(s) Oral daily  simvastatin 10 milliGRAM(s) Oral at bedtime    MEDICATIONS  (PRN):  diphenhydrAMINE   Injectable 25 milliGRAM(s) IV Push every 4 hours PRN Pruritus  HYDROmorphone PCA (1 mG/mL) Rescue Clinician Bolus 0.5 milliGRAM(s) IV Push every 15 minutes PRN for Pain Scale GREATER THAN 6  naloxone Injectable 0.1 milliGRAM(s) IV Push every 3 minutes PRN For ANY of the following changes in patient status:  A. RR LESS THAN 10 breaths per minute, B. Oxygen saturation LESS THAN 90%, C. Sedation score of 6  ondansetron Injectable 4 milliGRAM(s) IV Push every 6 hours PRN Nausea      Allergies    codeine (Nausea)  penicillin (Hives)    Intolerances        PERTINENT MEDICATION HISTORY:    SOCIAL HISTORY:  OCCUPATION:  TRAVEL HISTORY:    FAMILY HISTORY:      Vital Signs Last 24 Hrs  T(C): 36.4 (17 Apr 2021 08:00), Max: 37.4 (16 Apr 2021 13:06)  T(F): 97.6 (17 Apr 2021 08:00), Max: 99.4 (16 Apr 2021 13:06)  HR: 84 (17 Apr 2021 09:00) (63 - 92)  BP: 115/44 (17 Apr 2021 09:00) (97/78 - 152/58)  BP(mean): 60 (17 Apr 2021 09:00) (59 - 83)  RR: 16 (17 Apr 2021 09:00) (12 - 23)  SpO2: 92% (17 Apr 2021 09:00) (91% - 97%)    Physical Exam:  General: No apparent distress  HEENT: EOMI, MMM  CVS: +S1/S2, RRR, no murmurs/rubs/gallops  Resp: CTA b/l. No crackles/wheezing  GI: Soft, NT/ND +BS  MSK:  Shoulders: wnl  Elbows: wnl  Wrists: wnl  MCPs: wnl  PIPs: wnl  DIPs: wnl   Hips: wnl  Knees: wnl   Ankle: wnl  Neuro: AAOx3  Skin: no visible rashes    LABS:                        12.9   6.11  )-----------( 143      ( 17 Apr 2021 05:55 )             39.7     04-17    136  |  102  |  30<H>  ----------------------------<  84  4.5   |  19<L>  |  1.20    Ca    8.5      17 Apr 2021 05:55  Phos  6.5     04-17  Mg     2.2     04-17      PT/INR - ( 17 Apr 2021 05:55 )   PT: 12.1 sec;   INR: 1.05 ratio         PTT - ( 17 Apr 2021 05:55 )  PTT:65.3 sec      RADIOLOGY & ADDITIONAL STUDIES: BET[place]IE ESTRELLAFort [place]  [id number]    HISTORY OF PRESENT ILLNESS:   80 year old female with PMHx of HTN, ANCA vasculitis reportedly diagnosed 8-9 years ago, induced with rituximab and steroids at that time, and now only on maintenance with Azathioprine 150 mg QD, non smoker, was admitted to [facility] on 4/13/21 with acute 9/10 stabbing, non-radiating chest pain with exertional shortness of breath. Not associated with n/v and improved when laying down, patient was seen in the urgent care and had EKG showing TWI in the lateral and anterior leads. Patient reported recent travel from [place] in car (arrived to [place] a couple days prior to her hospitalization) but no LE edema or hx blood clots in the past.     During this admission, patient was found to have acute PE with b/l below the knee DVT's, currently on heparin gtt for anticoagulation. Her CT chest was also notable for bilateral mass lesions concerning for malignancy vs. inflammatory vs. infectious process. Patient underwent flexible bronchoscopy with VATS and LUIS wedge biopsy on 4/16, results are pending. She is currently in CTICU, her chest tube has been removed.    Regarding her ANCA vasculitis, she states she was diagnosed 8-9 years ago based on symptoms of "not being able to turn her head side to side" and "lower extremity weakness- unable to stand up"- these symptoms resolved after induction therapy.  Pt denies current or past symptoms of hemoptysis, sinusitis, abdominal pain, hematuria, or peripheral neuropathy. She reports never having lung or renal involvement of her vasculitis. She never had a biopsy done- her vasculitis was diagnosed based off of bloodwork.     PAST MEDICAL & SURGICAL HISTORY:  Vasculitis    ANCA-associated vasculitis    No significant past surgical history        Review of Systems:  Gen:  No fevers/chills, weight loss  HEENT: No blurry vision, no difficulty swallowing  CVS: No chest pain/palpitations  Resp: +dyspnea, discomfort on deep inspiration after surgery   GI: No N/V/C/D/abdominal pain  MSK: no joint pain or swelling  Skin: No new rashes  Neuro: No headaches    MEDICATIONS  (STANDING):  acetaminophen   Tablet .. 650 milliGRAM(s) Oral every 6 hours  azaTHIOprine 150 milliGRAM(s) Oral daily  citalopram 10 milliGRAM(s) Oral daily  heparin  Infusion 700 Unit(s)/Hr (7 mL/Hr) IV Continuous <Continuous>  HYDROmorphone PCA (1 mG/mL) 30 milliLiter(s) PCA Continuous PCA Continuous  lactated ringers. 1000 milliLiter(s) (30 mL/Hr) IV Continuous <Continuous>  lidocaine   Patch 1 Patch Transdermal every 24 hours  polyethylene glycol 3350 17 Gram(s) Oral daily  simvastatin 10 milliGRAM(s) Oral at bedtime    MEDICATIONS  (PRN):  diphenhydrAMINE   Injectable 25 milliGRAM(s) IV Push every 4 hours PRN Pruritus  HYDROmorphone PCA (1 mG/mL) Rescue Clinician Bolus 0.5 milliGRAM(s) IV Push every 15 minutes PRN for Pain Scale GREATER THAN 6  naloxone Injectable 0.1 milliGRAM(s) IV Push every 3 minutes PRN For ANY of the following changes in patient status:  A. RR LESS THAN 10 breaths per minute, B. Oxygen saturation LESS THAN 90%, C. Sedation score of 6  ondansetron Injectable 4 milliGRAM(s) IV Push every 6 hours PRN Nausea      Allergies    codeine (Nausea)  penicillin (Hives)    Intolerances        PERTINENT MEDICATION HISTORY:    SOCIAL HISTORY: No tobacco, alcohol or drug use. Tobacco use many decades ago.   OCCUPATION: Not employed  TRAVEL HISTORY: Just returned by car from [place] last week.     FAMILY HISTORY: +Family history of blood clots in patient's siblings.       Vital Signs Last 24 Hrs  T(C): 36.4 (17 Apr 2021 08:00), Max: 37.4 (16 Apr 2021 13:06)  T(F): 97.6 (17 Apr 2021 08:00), Max: 99.4 (16 Apr 2021 13:06)  HR: 84 (17 Apr 2021 09:00) (63 - 92)  BP: 115/44 (17 Apr 2021 09:00) (97/78 - 152/58)  BP(mean): 60 (17 Apr 2021 09:00) (59 - 83)  RR: 16 (17 Apr 2021 09:00) (12 - 23)  SpO2: 92% (17 Apr 2021 09:00) (91% - 97%)    Physical Exam:  General: No apparent distress, on nasal cannula supplemental O2   HEENT: EOMI, MMM  CVS: +S1/S2, RRR, no murmurs/rubs/gallops  Resp: limited inspiratory effort 2/2 pain; no crackles or wheezing  heard.   GI: Soft, NT/ND +BS  MSK: no joint synovitis on exam.   Shoulders: wnl  Elbows: wnl  Wrists: wnl  MCPs: wnl  PIPs: wnl  DIPs: wnl   Hips: wnl  Knees: wnl   Ankle: wnl  Neuro: AAOx3  Skin: no visible rashes    LABS:                        12.9   6.11  )-----------( 143      ( 17 Apr 2021 05:55 )             39.7     04-17    136  |  102  |  30<H>  ----------------------------<  84  4.5   |  19<L>  |  1.20    Ca    8.5      17 Apr 2021 05:55  Phos  6.5     04-17  Mg     2.2     04-17      PT/INR - ( 17 Apr 2021 05:55 )   PT: 12.1 sec;   INR: 1.05 ratio         PTT - ( 17 Apr 2021 05:55 )  PTT:65.3 sec      RADIOLOGY & ADDITIONAL STUDIES:    CTA chest 4/13/21    IMPRESSION: Right lower and middle lobe pulmonary arterial emboli as described above.    Bilateral masslike and nodular consolidations measuring up to 8.6 cm as described above. Differential diagnosis include infectious/inflammatory or neoplastic etiologies. A 1 month follow-up noncontrast chest CT is recommended for complete evaluation.    Mediastinal lymphadenopathy.    2 x 3 cm right lower lobe paraspinal chronic cyst with mild peripheral wall thickening can be monitored on the follow-up chest CT.    Rounded area of decreased enhancement involving the upper pole of the partially imaged right kidney. Differential diagnosis include renal infarct or renal neoplasm. Dedicated contrast-enhanced abdominal CT is recommended for complete evaluation. BETTIE ESTRELLADawn  [id number]    HISTORY OF PRESENT ILLNESS:   80 year old female with PMHx of HTN, ANCA vasculitis reportedly diagnosed 8-9 years ago, induced with rituximab and steroids at that time, and now only on maintenance with Azathioprine 150 mg QD, non smoker, was admitted to [facility] on 4/13/21 with acute 9/10 stabbing, non-radiating chest pain with exertional shortness of breath. Not associated with n/v and improved when laying down, patient was seen in the urgent care and had EKG showing TWI in the lateral and anterior leads. Patient reported recent travel from [place] in car (arrived to [place] a couple days prior to her hospitalization) but no LE edema or hx blood clots in the past.     During this admission, patient was found to have acute PE with b/l below the knee DVT's, currently on heparin gtt for anticoagulation. Her CT chest was also notable for bilateral mass lesions concerning for malignancy vs. inflammatory vs. infectious process. Patient underwent flexible bronchoscopy with VATS and LUIS wedge biopsy on 4/16, results are pending. She is currently in CTICU, her chest tube has been removed.    Regarding her ANCA vasculitis, she states she was diagnosed 8-9 years ago based on symptoms of "not being able to turn her head side to side" and "lower extremity weakness- unable to stand up"- these symptoms resolved after induction therapy.  Pt denies current or past symptoms of hemoptysis, sinusitis, abdominal pain, hematuria, joint pain/joint swelling, rashes, oral ulcers, or peripheral neuropathy. She reports never having lung or renal involvement of her vasculitis. She never had a biopsy done- her vasculitis was diagnosed based off of bloodwork.     PAST MEDICAL & SURGICAL HISTORY:  Vasculitis    ANCA-associated vasculitis    No significant past surgical history        Review of Systems:  Gen:  No fevers/chills, weight loss  HEENT: No blurry vision, no difficulty swallowing  CVS: No chest pain/palpitations  Resp: +dyspnea, discomfort on deep inspiration after surgery   GI: No N/V/C/D/abdominal pain  MSK: no joint pain or swelling  Skin: No new rashes  Neuro: No headaches    MEDICATIONS  (STANDING):  acetaminophen   Tablet .. 650 milliGRAM(s) Oral every 6 hours  azaTHIOprine 150 milliGRAM(s) Oral daily  citalopram 10 milliGRAM(s) Oral daily  heparin  Infusion 700 Unit(s)/Hr (7 mL/Hr) IV Continuous <Continuous>  HYDROmorphone PCA (1 mG/mL) 30 milliLiter(s) PCA Continuous PCA Continuous  lactated ringers. 1000 milliLiter(s) (30 mL/Hr) IV Continuous <Continuous>  lidocaine   Patch 1 Patch Transdermal every 24 hours  polyethylene glycol 3350 17 Gram(s) Oral daily  simvastatin 10 milliGRAM(s) Oral at bedtime    MEDICATIONS  (PRN):  diphenhydrAMINE   Injectable 25 milliGRAM(s) IV Push every 4 hours PRN Pruritus  HYDROmorphone PCA (1 mG/mL) Rescue Clinician Bolus 0.5 milliGRAM(s) IV Push every 15 minutes PRN for Pain Scale GREATER THAN 6  naloxone Injectable 0.1 milliGRAM(s) IV Push every 3 minutes PRN For ANY of the following changes in patient status:  A. RR LESS THAN 10 breaths per minute, B. Oxygen saturation LESS THAN 90%, C. Sedation score of 6  ondansetron Injectable 4 milliGRAM(s) IV Push every 6 hours PRN Nausea      Allergies    codeine (Nausea)  penicillin (Hives)    Intolerances        PERTINENT MEDICATION HISTORY:    SOCIAL HISTORY: No tobacco, alcohol or drug use. Tobacco use many decades ago.   OCCUPATION: Not employed  TRAVEL HISTORY: Just returned by car from [place] last week.     FAMILY HISTORY: +Family history of blood clots in patient's siblings.       Vital Signs Last 24 Hrs  T(C): 36.4 (17 Apr 2021 08:00), Max: 37.4 (16 Apr 2021 13:06)  T(F): 97.6 (17 Apr 2021 08:00), Max: 99.4 (16 Apr 2021 13:06)  HR: 84 (17 Apr 2021 09:00) (63 - 92)  BP: 115/44 (17 Apr 2021 09:00) (97/78 - 152/58)  BP(mean): 60 (17 Apr 2021 09:00) (59 - 83)  RR: 16 (17 Apr 2021 09:00) (12 - 23)  SpO2: 92% (17 Apr 2021 09:00) (91% - 97%)    Physical Exam:  General: No apparent distress, on nasal cannula supplemental O2   HEENT: EOMI, MMM  CVS: +S1/S2, RRR, no murmurs/rubs/gallops  Resp: limited inspiratory effort 2/2 pain; no crackles or wheezing  heard.   GI: Soft, NT/ND +BS  MSK: no joint synovitis on exam.   Shoulders: wnl  Elbows: wnl  Wrists: wnl  MCPs: wnl  PIPs: wnl  DIPs: wnl   Hips: wnl  Knees: wnl   Ankle: wnl  Neuro: AAOx3  Skin: no visible rashes    LABS:                        12.9   6.11  )-----------( 143      ( 17 Apr 2021 05:55 )             39.7     04-17    136  |  102  |  30<H>  ----------------------------<  84  4.5   |  19<L>  |  1.20    Ca    8.5      17 Apr 2021 05:55  Phos  6.5     04-17  Mg     2.2     04-17      PT/INR - ( 17 Apr 2021 05:55 )   PT: 12.1 sec;   INR: 1.05 ratio         PTT - ( 17 Apr 2021 05:55 )  PTT:65.3 sec      RADIOLOGY & ADDITIONAL STUDIES:    CTA chest 4/13/21    IMPRESSION: Right lower and middle lobe pulmonary arterial emboli as described above.    Bilateral masslike and nodular consolidations measuring up to 8.6 cm as described above. Differential diagnosis include infectious/inflammatory or neoplastic etiologies. A 1 month follow-up noncontrast chest CT is recommended for complete evaluation.    Mediastinal lymphadenopathy.    2 x 3 cm right lower lobe paraspinal chronic cyst with mild peripheral wall thickening can be monitored on the follow-up chest CT.    Rounded area of decreased enhancement involving the upper pole of the partially imaged right kidney. Differential diagnosis include renal infarct or renal neoplasm. Dedicated contrast-enhanced abdominal CT is recommended for complete evaluation. [place]IE [place]LLABirmingham  [id number]    HISTORY OF PRESENT ILLNESS:   80 year old female with PMHx of HTN, ANCA vasculitis reportedly diagnosed 8-9 years ago, induced with rituximab and steroids at that time, and now only on maintenance with Azathioprine 150 mg QD, non smoker, was admitted to [facility] on 4/13/21 with acute 9/10 stabbing, non-radiating chest pain with exertional shortness of breath. Not associated with n/v and improved when laying down, patient was seen in the urgent care and had EKG showing TWI in the lateral and anterior leads. Patient reported recent travel from [place] in car (arrived to [place] a couple days prior to her hospitalization) but no LE edema or hx blood clots in the past.     During this admission, patient was found to have acute PE with b/l below the knee DVT's, currently on heparin gtt for anticoagulation. Her CT chest was also notable for bilateral mass lesions concerning for malignancy vs. inflammatory vs. infectious process. Patient underwent flexible bronchoscopy with VATS and LUIS wedge biopsy on 4/16, results are pending. She is currently in CTICU, her chest tube has been removed.    Regarding her ANCA vasculitis, she states she was diagnosed 8-9 years ago based on symptoms of "not being able to turn her head side to side" and "lower extremity weakness- unable to stand up"- these symptoms resolved after induction therapy. Currently follows with Dr. Kasandra Plaza whom she sees once yearly.   Pt denies current or past symptoms of hemoptysis, sinusitis, abdominal pain, hematuria, joint pain/joint swelling, rashes, oral ulcers, or peripheral neuropathy. She reports never having lung or renal involvement of her vasculitis. She never had a biopsy done- her vasculitis was diagnosed based off of bloodwork.     PAST MEDICAL & SURGICAL HISTORY:  Vasculitis    ANCA-associated vasculitis    No significant past surgical history        Review of Systems:  Gen:  No fevers/chills, weight loss  HEENT: No blurry vision, no difficulty swallowing  CVS: No chest pain/palpitations  Resp: +dyspnea, discomfort on deep inspiration after surgery   GI: No N/V/C/D/abdominal pain  MSK: no joint pain or swelling  Skin: No new rashes  Neuro: No headaches    MEDICATIONS  (STANDING):  acetaminophen   Tablet .. 650 milliGRAM(s) Oral every 6 hours  azaTHIOprine 150 milliGRAM(s) Oral daily  citalopram 10 milliGRAM(s) Oral daily  heparin  Infusion 700 Unit(s)/Hr (7 mL/Hr) IV Continuous <Continuous>  HYDROmorphone PCA (1 mG/mL) 30 milliLiter(s) PCA Continuous PCA Continuous  lactated ringers. 1000 milliLiter(s) (30 mL/Hr) IV Continuous <Continuous>  lidocaine   Patch 1 Patch Transdermal every 24 hours  polyethylene glycol 3350 17 Gram(s) Oral daily  simvastatin 10 milliGRAM(s) Oral at bedtime    MEDICATIONS  (PRN):  diphenhydrAMINE   Injectable 25 milliGRAM(s) IV Push every 4 hours PRN Pruritus  HYDROmorphone PCA (1 mG/mL) Rescue Clinician Bolus 0.5 milliGRAM(s) IV Push every 15 minutes PRN for Pain Scale GREATER THAN 6  naloxone Injectable 0.1 milliGRAM(s) IV Push every 3 minutes PRN For ANY of the following changes in patient status:  A. RR LESS THAN 10 breaths per minute, B. Oxygen saturation LESS THAN 90%, C. Sedation score of 6  ondansetron Injectable 4 milliGRAM(s) IV Push every 6 hours PRN Nausea      Allergies    codeine (Nausea)  penicillin (Hives)    Intolerances        PERTINENT MEDICATION HISTORY:    SOCIAL HISTORY: No tobacco, alcohol or drug use. Tobacco use many decades ago.   OCCUPATION: Not employed  TRAVEL HISTORY: Just returned by car from [place] last week.     FAMILY HISTORY: +Family history of blood clots in patient's siblings.       Vital Signs Last 24 Hrs  T(C): 36.4 (17 Apr 2021 08:00), Max: 37.4 (16 Apr 2021 13:06)  T(F): 97.6 (17 Apr 2021 08:00), Max: 99.4 (16 Apr 2021 13:06)  HR: 84 (17 Apr 2021 09:00) (63 - 92)  BP: 115/44 (17 Apr 2021 09:00) (97/78 - 152/58)  BP(mean): 60 (17 Apr 2021 09:00) (59 - 83)  RR: 16 (17 Apr 2021 09:00) (12 - 23)  SpO2: 92% (17 Apr 2021 09:00) (91% - 97%)    Physical Exam:  General: No apparent distress, on nasal cannula supplemental O2   HEENT: EOMI, MMM  CVS: +S1/S2, RRR, no murmurs/rubs/gallops  Resp: limited inspiratory effort 2/2 pain; no crackles or wheezing  heard.   GI: Soft, NT/ND +BS  MSK: no joint synovitis on exam.   Shoulders: wnl  Elbows: wnl  Wrists: wnl  MCPs: wnl  PIPs: wnl  DIPs: wnl   Hips: wnl  Knees: wnl   Ankle: wnl  Neuro: AAOx3  Skin: no visible rashes    LABS:                        12.9   6.11  )-----------( 143      ( 17 Apr 2021 05:55 )             39.7     04-17    136  |  102  |  30<H>  ----------------------------<  84  4.5   |  19<L>  |  1.20    Ca    8.5      17 Apr 2021 05:55  Phos  6.5     04-17  Mg     2.2     04-17      PT/INR - ( 17 Apr 2021 05:55 )   PT: 12.1 sec;   INR: 1.05 ratio         PTT - ( 17 Apr 2021 05:55 )  PTT:65.3 sec      RADIOLOGY & ADDITIONAL STUDIES:    CTA chest 4/13/21    IMPRESSION: Right lower and middle lobe pulmonary arterial emboli as described above.    Bilateral masslike and nodular consolidations measuring up to 8.6 cm as described above. Differential diagnosis include infectious/inflammatory or neoplastic etiologies. A 1 month follow-up noncontrast chest CT is recommended for complete evaluation.    Mediastinal lymphadenopathy.    2 x 3 cm right lower lobe paraspinal chronic cyst with mild peripheral wall thickening can be monitored on the follow-up chest CT.    Rounded area of decreased enhancement involving the upper pole of the partially imaged right kidney. Differential diagnosis include renal infarct or renal neoplasm. Dedicated contrast-enhanced abdominal CT is recommended for complete evaluation.

## 2021-04-17 NOTE — PROGRESS NOTE ADULT - SUBJECTIVE AND OBJECTIVE BOX
SUBJECTIVE / OVERNIGHT EVENTS:  remain stable  chest tube removed  the daughter Leeann at bedside.  no cp, no sob, no n/v/d. no abdominal pain.  no headache, no dizziness.         --------------------------------------------------------------------------------------------  LABS:                        12.9   6.11  )-----------( 143      ( 17 Apr 2021 05:55 )             39.7     04-17    136  |  102  |  30<H>  ----------------------------<  84  4.5   |  19<L>  |  1.20    Ca    8.5      17 Apr 2021 05:55  Phos  6.5     04-17  Mg     2.2     04-17      PT/INR - ( 17 Apr 2021 05:55 )   PT: 12.1 sec;   INR: 1.05 ratio         PTT - ( 17 Apr 2021 12:30 )  PTT:60.4 sec  CAPILLARY BLOOD GLUCOSE      RADIOLOGY & ADDITIONAL TESTS:    Imaging Personally Reviewed:  [x] YES  [ ] NO    Consultant(s) Notes Reviewed:  [x] YES  [ ] NO    MEDICATIONS  (STANDING):  acetaminophen   Tablet .. 650 milliGRAM(s) Oral every 6 hours  citalopram 10 milliGRAM(s) Oral daily  heparin  Infusion 700 Unit(s)/Hr (7.5 mL/Hr) IV Continuous <Continuous>  HYDROmorphone PCA (1 mG/mL) 30 milliLiter(s) PCA Continuous PCA Continuous  lactated ringers. 1000 milliLiter(s) (30 mL/Hr) IV Continuous <Continuous>  lidocaine   Patch 1 Patch Transdermal every 24 hours  polyethylene glycol 3350 17 Gram(s) Oral daily  simvastatin 10 milliGRAM(s) Oral at bedtime    MEDICATIONS  (PRN):  diphenhydrAMINE   Injectable 25 milliGRAM(s) IV Push every 4 hours PRN Pruritus  HYDROmorphone PCA (1 mG/mL) Rescue Clinician Bolus 0.5 milliGRAM(s) IV Push every 15 minutes PRN for Pain Scale GREATER THAN 6  naloxone Injectable 0.1 milliGRAM(s) IV Push every 3 minutes PRN For ANY of the following changes in patient status:  A. RR LESS THAN 10 breaths per minute, B. Oxygen saturation LESS THAN 90%, C. Sedation score of 6  ondansetron Injectable 4 milliGRAM(s) IV Push every 6 hours PRN Nausea      Care Discussed with Consultants/Other Providers [x] YES  [ ] NO    Vital Signs Last 24 Hrs  T(C): 36.5 (17 Apr 2021 12:00), Max: 36.6 (17 Apr 2021 04:00)  T(F): 97.7 (17 Apr 2021 12:00), Max: 97.8 (17 Apr 2021 04:00)  HR: 104 (17 Apr 2021 18:00) (63 - 104)  BP: 151/52 (17 Apr 2021 18:00) (97/78 - 151/52)  BP(mean): 76 (17 Apr 2021 18:00) (58 - 83)  RR: 20 (17 Apr 2021 18:00) (12 - 20)  SpO2: 97% (17 Apr 2021 18:00) (91% - 97%)  I&O's Summary    16 Apr 2021 07:01  -  17 Apr 2021 07:00  --------------------------------------------------------  IN: 423 mL / OUT: 0 mL / NET: 423 mL    17 Apr 2021 07:01  -  17 Apr 2021 19:24  --------------------------------------------------------  IN: 446.5 mL / OUT: 645 mL / NET: -198.5 mL      PHYSICAL EXAM:  GENERAL: NAD, well-developed, comfortable  HEAD:  Atraumatic, Normocephalic  EYES: EOMI, PERRLA, conjunctiva and sclera clear  NECK: Supple, No JVD  CHEST/LUNG: mild decrease breath sounds bilaterally; No wheeze   HEART: Regular rate and rhythm; No murmurs, rubs, or gallops  ABDOMEN: Soft, Nontender, Nondistended; Bowel sounds present  Neuro: AAOx3, no focal weakness, 5/5 b/l extremity strength  EXTREMITIES:  2+ Peripheral Pulses, No clubbing, cyanosis, or edema  SKIN: No rashes or lesions

## 2021-04-17 NOTE — PROGRESS NOTE ADULT - SUBJECTIVE AND OBJECTIVE BOX
ANESTHESIA POSTOP CHECK    80y Female POSTOP DAY 1 S/P L VATS/ LUIS Wedge Resection.     Vital Signs Last 24 Hrs  T(C): 36.5 (17 Apr 2021 12:00), Max: 37 (16 Apr 2021 17:00)  T(F): 97.7 (17 Apr 2021 12:00), Max: 98.6 (16 Apr 2021 17:00)  HR: 71 (17 Apr 2021 15:00) (63 - 91)  BP: 119/45 (17 Apr 2021 15:00) (97/78 - 139/48)  BP(mean): 64 (17 Apr 2021 15:00) (58 - 83)  RR: 17 (17 Apr 2021 15:00) (12 - 23)  SpO2: 94% (17 Apr 2021 15:00) (91% - 97%)    I&O's Summary    16 Apr 2021 07:01  -  17 Apr 2021 07:00  --------------------------------------------------------  IN: 423 mL / OUT: 0 mL / NET: 423 mL    17 Apr 2021 07:01  -  17 Apr 2021 16:12  --------------------------------------------------------  IN: 296.5 mL / OUT: 415 mL / NET: -118.5 mL        [X ] NO APPARENT ANESTHESIA COMPLICATIONS      Gunjan Rawls, PGY 2  Anesthesiology  Pager: [phone]

## 2021-04-17 NOTE — PROGRESS NOTE ADULT - SUBJECTIVE AND OBJECTIVE BOX
Anesthesia Pain Management Service    SUBJECTIVE: Patient is doing well with IV PCA and no significant problems reported.  Patient states IV PCA helps with her pain.    Pain Scale Score	At rest: _7/10__ 	With Activity: ___ 	[X ] Refer to charted pain scores    THERAPY:    [ ] IV PCA Morphine		[ ] 5 mg/mL	[ ] 1 mg/mL  [X ] IV PCA Hydromorphone	[ ] 5 mg/mL	[X ] 1 mg/mL  [ ] IV PCA Fentanyl		[ ] 50 micrograms/mL    Demand dose __0.2_ lockout __6_ (minutes) Continuous Rate _0__ Total: 3.2  mg used (in past 24 hours)      MEDICATIONS  (STANDING):  acetaminophen   Tablet .. 650 milliGRAM(s) Oral every 6 hours  azaTHIOprine 150 milliGRAM(s) Oral daily  citalopram 10 milliGRAM(s) Oral daily  heparin  Infusion 700 Unit(s)/Hr (7 mL/Hr) IV Continuous <Continuous>  HYDROmorphone PCA (1 mG/mL) 30 milliLiter(s) PCA Continuous PCA Continuous  lactated ringers. 1000 milliLiter(s) (30 mL/Hr) IV Continuous <Continuous>  lidocaine   Patch 1 Patch Transdermal every 24 hours  polyethylene glycol 3350 17 Gram(s) Oral daily  simvastatin 10 milliGRAM(s) Oral at bedtime    MEDICATIONS  (PRN):  diphenhydrAMINE   Injectable 25 milliGRAM(s) IV Push every 4 hours PRN Pruritus  HYDROmorphone PCA (1 mG/mL) Rescue Clinician Bolus 0.5 milliGRAM(s) IV Push every 15 minutes PRN for Pain Scale GREATER THAN 6  naloxone Injectable 0.1 milliGRAM(s) IV Push every 3 minutes PRN For ANY of the following changes in patient status:  A. RR LESS THAN 10 breaths per minute, B. Oxygen saturation LESS THAN 90%, C. Sedation score of 6  ondansetron Injectable 4 milliGRAM(s) IV Push every 6 hours PRN Nausea      OBJECTIVE:  Patient is sitting up in chair with CT x1.    Sedation Score:	[ X] Alert	 [ ] Drowsy 	[ ] Arousable	[ ] Asleep	[ ] Unresponsive    Side Effects:	[X ] None	[ ] Nausea	[ ] Vomiting	[ ] Pruritus  		[ ] Other:    Vital Signs Last 24 Hrs  Vital Signs Last 24 Hrs  T(C): 36.5 (17 Apr 2021 12:00), Max: 37 (16 Apr 2021 17:00)  T(F): 97.7 (17 Apr 2021 12:00), Max: 98.6 (16 Apr 2021 17:00)  HR: 71 (17 Apr 2021 15:00) (63 - 91)  BP: 119/45 (17 Apr 2021 15:00) (97/78 - 139/48)  BP(mean): 64 (17 Apr 2021 15:00) (58 - 83)  RR: 17 (17 Apr 2021 15:00) (12 - 23)  SpO2: 94% (17 Apr 2021 15:00) (91% - 97%)    ASSESSMENT/ PLAN    Therapy to  be:	[ X] Continue   [ ] Discontinued   [ ] Change to prn Analgesics    Documentation and Verification of current medications:   [X] Done	[ ] Not done, not elligible    Comments: Will continue with IV Dilaudid PCA. Added lidocaine patch.  Recommend non-opioid adjuvant analgesics to be used when possible and when allowed by primary surgical team.    Gunjan Rawls, PGY 2  Department of Anesthesiology  [id number]

## 2021-04-17 NOTE — CONSULT NOTE ADULT - CONSULT REQUESTED DATE/TIME
14-Apr-2021 12:41
15-Apr-2021 10:57
15-Apr-2021
17-Apr-2021 09:40
14-Apr-2021 11:27
14-Apr-2021 12:16
16-Apr-2021 13:57

## 2021-04-17 NOTE — PROGRESS NOTE ADULT - SUBJECTIVE AND OBJECTIVE BOX
CARDIOLOGY FOLLOW UP - Dr. Hooker  DATE OF SERVICE: 4/17/21     CC no cp or sob   s/p Flexible bronchoscopy / vats yesterday       REVIEW OF SYSTEMS:  CONSTITUTIONAL: No fever, weight loss, or fatigue  RESPIRATORY: No cough, wheezing, chills or hemoptysis; No Shortness of Breath  CARDIOVASCULAR: No chest pain, palpitations, passing out, dizziness, or leg swelling  GASTROINTESTINAL: No abdominal or epigastric pain. No nausea, vomiting, or hematemesis; No diarrhea or constipation. No melena or hematochezia.  VASCULAR: No edema     PHYSICAL EXAM:  T(C): 36.6 (04-17-21 @ 04:00), Max: 37.4 (04-16-21 @ 13:06)  HR: 73 (04-17-21 @ 07:00) (63 - 92)  BP: 97/78 (04-17-21 @ 07:00) (97/78 - 152/58)  RR: 18 (04-17-21 @ 07:00) (12 - 23)  SpO2: 97% (04-17-21 @ 07:00) (91% - 97%)  Wt(kg): --  I&O's Summary    16 Apr 2021 07:01  -  17 Apr 2021 07:00  --------------------------------------------------------  IN: 201 mL / OUT: 0 mL / NET: 201 mL        Appearance: Normal	  Cardiovascular: Normal S1 S2,RRR  Respiratory: Lungs clear to auscultation	  Gastrointestinal:  Soft, Non-tender, + BS	  Extremities: Normal range of motion, No clubbing, cyanosis or edema      Home Medications:      MEDICATIONS  (STANDING):  acetaminophen   Tablet .. 650 milliGRAM(s) Oral every 6 hours  azaTHIOprine 150 milliGRAM(s) Oral daily  citalopram 10 milliGRAM(s) Oral daily  heparin  Infusion 700 Unit(s)/Hr (7 mL/Hr) IV Continuous <Continuous>  HYDROmorphone PCA (1 mG/mL) 30 milliLiter(s) PCA Continuous PCA Continuous  lactated ringers. 1000 milliLiter(s) (30 mL/Hr) IV Continuous <Continuous>  polyethylene glycol 3350 17 Gram(s) Oral daily  simvastatin 10 milliGRAM(s) Oral at bedtime      TELEMETRY: nsr  	    ECG:  	  RADIOLOGY:   DIAGNOSTIC TESTING:  [ ] Echocardiogram:  [ ]  Catheterization:  [ ] Stress Test:    OTHER: 	    LABS:	 	    Troponin T, High Sensitivity Result: 7 ng/L (04-13 @ 16:14)  Troponin T, High Sensitivity Result: 6 ng/L (04-13 @ 13:36)                          12.9   6.11  )-----------( 143      ( 17 Apr 2021 05:55 )             39.7     04-17    136  |  102  |  30<H>  ----------------------------<  84  4.5   |  19<L>  |  1.20    Ca    8.5      17 Apr 2021 05:55  Phos  6.5     04-17  Mg     2.2     04-17      PT/INR - ( 17 Apr 2021 05:55 )   PT: 12.1 sec;   INR: 1.05 ratio         PTT - ( 17 Apr 2021 05:55 )  PTT:65.3 sec

## 2021-04-18 LAB
24R-OH-CALCIDIOL SERPL-MCNC: 52.6 NG/ML — SIGNIFICANT CHANGE UP (ref 30–80)
ANION GAP SERPL CALC-SCNC: 8 MMOL/L — SIGNIFICANT CHANGE UP (ref 7–14)
APTT BLD: 74 SEC — HIGH (ref 27–36.3)
BUN SERPL-MCNC: 23 MG/DL — SIGNIFICANT CHANGE UP (ref 7–23)
CALCIUM SERPL-MCNC: 8.1 MG/DL — LOW (ref 8.4–10.5)
CHLORIDE SERPL-SCNC: 100 MMOL/L — SIGNIFICANT CHANGE UP (ref 98–107)
CO2 SERPL-SCNC: 22 MMOL/L — SIGNIFICANT CHANGE UP (ref 22–31)
CREAT SERPL-MCNC: 0.78 MG/DL — SIGNIFICANT CHANGE UP (ref 0.5–1.3)
GLUCOSE SERPL-MCNC: 93 MG/DL — SIGNIFICANT CHANGE UP (ref 70–99)
HAV IGM SER-ACNC: SIGNIFICANT CHANGE UP
HBV CORE AB SER-ACNC: SIGNIFICANT CHANGE UP
HBV CORE IGM SER-ACNC: SIGNIFICANT CHANGE UP
HBV SURFACE AG SER-ACNC: SIGNIFICANT CHANGE UP
HCT VFR BLD CALC: 36.8 % — SIGNIFICANT CHANGE UP (ref 34.5–45)
HCV AB S/CO SERPL IA: 0.12 S/CO — SIGNIFICANT CHANGE UP (ref 0–0.99)
HCV AB SERPL-IMP: SIGNIFICANT CHANGE UP
HGB BLD-MCNC: 12.3 G/DL — SIGNIFICANT CHANGE UP (ref 11.5–15.5)
IGA FLD-MCNC: 147 MG/DL — SIGNIFICANT CHANGE UP (ref 70–400)
IGG FLD-MCNC: 1278 MG/DL — SIGNIFICANT CHANGE UP (ref 700–1600)
IGM SERPL-MCNC: 66 MG/DL — SIGNIFICANT CHANGE UP (ref 40–230)
MAGNESIUM SERPL-MCNC: 2 MG/DL — SIGNIFICANT CHANGE UP (ref 1.6–2.6)
MCHC RBC-ENTMCNC: 33.4 GM/DL — SIGNIFICANT CHANGE UP (ref 32–36)
MCHC RBC-ENTMCNC: 35.2 PG — HIGH (ref 27–34)
MCV RBC AUTO: 105.4 FL — HIGH (ref 80–100)
NRBC # BLD: 0 /100 WBCS — SIGNIFICANT CHANGE UP
NRBC # FLD: 0 K/UL — SIGNIFICANT CHANGE UP
PHOSPHATE SERPL-MCNC: 1.9 MG/DL — LOW (ref 2.5–4.5)
PLATELET # BLD AUTO: 137 K/UL — LOW (ref 150–400)
POTASSIUM SERPL-MCNC: 4.5 MMOL/L — SIGNIFICANT CHANGE UP (ref 3.5–5.3)
POTASSIUM SERPL-SCNC: 4.5 MMOL/L — SIGNIFICANT CHANGE UP (ref 3.5–5.3)
RBC # BLD: 3.49 M/UL — LOW (ref 3.8–5.2)
RBC # FLD: 14.7 % — HIGH (ref 10.3–14.5)
SODIUM SERPL-SCNC: 130 MMOL/L — LOW (ref 135–145)
VIT D25+D1,25 OH+D1,25 PNL SERPL-MCNC: 96.8 PG/ML — HIGH (ref 19.9–79.3)
WBC # BLD: 6.47 K/UL — SIGNIFICANT CHANGE UP (ref 3.8–10.5)
WBC # FLD AUTO: 6.47 K/UL — SIGNIFICANT CHANGE UP (ref 3.8–10.5)

## 2021-04-18 PROCEDURE — 71045 X-RAY EXAM CHEST 1 VIEW: CPT | Mod: 26

## 2021-04-18 PROCEDURE — 99233 SBSQ HOSP IP/OBS HIGH 50: CPT

## 2021-04-18 RX ORDER — TRAMADOL HYDROCHLORIDE 50 MG/1
50 TABLET ORAL EVERY 6 HOURS
Refills: 0 | Status: DISCONTINUED | OUTPATIENT
Start: 2021-04-18 | End: 2021-04-21

## 2021-04-18 RX ORDER — APIXABAN 2.5 MG/1
10 TABLET, FILM COATED ORAL
Refills: 0 | Status: DISCONTINUED | OUTPATIENT
Start: 2021-04-18 | End: 2021-04-21

## 2021-04-18 RX ADMIN — Medication 650 MILLIGRAM(S): at 06:09

## 2021-04-18 RX ADMIN — SIMVASTATIN 10 MILLIGRAM(S): 20 TABLET, FILM COATED ORAL at 21:49

## 2021-04-18 RX ADMIN — LIDOCAINE 1 PATCH: 4 CREAM TOPICAL at 23:00

## 2021-04-18 RX ADMIN — APIXABAN 10 MILLIGRAM(S): 2.5 TABLET, FILM COATED ORAL at 17:59

## 2021-04-18 RX ADMIN — LIDOCAINE 1 PATCH: 4 CREAM TOPICAL at 11:45

## 2021-04-18 RX ADMIN — Medication 650 MILLIGRAM(S): at 17:59

## 2021-04-18 RX ADMIN — Medication 650 MILLIGRAM(S): at 12:00

## 2021-04-18 RX ADMIN — Medication 650 MILLIGRAM(S): at 17:30

## 2021-04-18 RX ADMIN — Medication 650 MILLIGRAM(S): at 12:23

## 2021-04-18 RX ADMIN — LIDOCAINE 1 PATCH: 4 CREAM TOPICAL at 20:00

## 2021-04-18 RX ADMIN — Medication 63.75 MILLIMOLE(S): at 10:30

## 2021-04-18 RX ADMIN — LIDOCAINE 1 PATCH: 4 CREAM TOPICAL at 00:00

## 2021-04-18 RX ADMIN — CITALOPRAM 10 MILLIGRAM(S): 10 TABLET, FILM COATED ORAL at 12:22

## 2021-04-18 RX ADMIN — TRAMADOL HYDROCHLORIDE 50 MILLIGRAM(S): 50 TABLET ORAL at 20:21

## 2021-04-18 NOTE — PROGRESS NOTE ADULT - ASSESSMENT
Patient is a 80 year old female with PMHx of HTN, ANCA vasculitis, non smoker, presents with acute 9/10 stabbing, non-radiating chest pain with exertional shortness of breath. Not associated with n/v and improved when laying down, patient was seen in the urgent care and had EKG showing TWI in the lateral and anterior leads. Patient reported recent travel to [place] in car but no LE edema or hx blood clots.    PE:  Lung masses  R renal infarct: CT showing possible R renal infarct, No complaints of R flank pain or urinary symptoms, Renal US IMPRESSION: Hypoechoic lesion in the upper pole of right kidney which corresponds to the cystic lesion identified on recent CT. This may represent a complex cyst or a solid renal mass and should be further characterized with a contrast-enhanced MRI on outpatient basis.  HTN:  ANCA Vasculitis:    4/14/2021:      PE: on heparin: get dopplers are postive and echo is still pending? noac ?  ANCA Vasculitis: on azathioprine: 150 mg per day : LFTs are normal   Lung masses: DW ROLAND: She has large masses on both sides and small nodule: RUL and LLL: may be exacerbation of vasculitis ?: core biopsy vs vats biopsy : get RHEUM involved and ID involved: ? fungal infection can happen: id consult: she does not look sick   No enlarged axillary lymph nodes. A few enlarged mediastinal lymph nodes with the largest in a precarinal location measuring about 1.3 cm.: dw roland: small : follow up   R renal infarct: CT showing possible R renal infarct, No complaints of R flank pain or urinary symptoms, Renal US IMPRESSION: Hypoechoic lesion in the upper pole of right kidney which corresponds to the cystic lesion identified on recent CT. This may represent a complex cyst or a solid renal mass and should be further characterized with a contrast-enhanced MRI on outpatient basis.  HTN: controlled  DW PMD     4/16:    PE: on heparin: for vats biopsy today   ANCA Vasculitis: on azathioprine: 150 mg per day : LFTs are normal  rheum consult pending  Lung masses: DW ROLAND: She has large masses on both sides and small nodule: RUL and LLL: may be exacerbation of vasculitis ?: core biopsy vs vats biopsy : get RHEUM involved and ID involved: ? fungal infection can happen: id consult: she does not look sick   No enlarged axillary lymph nodes. A few enlarged mediastinal lymph nodes with the largest in a precarinal location measuring about 1.3 cm.: leigha roland: small : follow up   R renal infarct: CT showing possible R renal infarct, No complaints of R flank pain or urinary symptoms, Renal US IMPRESSION: Hypoechoic lesion in the upper pole of right kidney which corresponds to the cystic lesion identified on recent CT. This may represent a complex cyst or a solid renal mass and should be further characterized with a contrast-enhanced MRI on outpatient basis.  HTN: controlled  LEIGHA PMD     4/18:      PE/dvt: on heparin: s/p vats biopsy : await path   ANCA Vasculitis: on azathioprine: 150 mg per day : LFTs are normal  rheum consult pending  Lung masses: LEIGHA ROLAND: She has large masses on both sides and small nodule: RUL and LLL: may be exacerbation of vasculitis : biopsy done: await pathology   No enlarged axillary lymph nodes. A few enlarged mediastinal lymph nodes with the largest in a precarinal location measuring about 1.3 cm.: leigha roland: small : follow up   R renal infarct: CT showing possible R renal infarct, No complaints of R flank pain or urinary symptoms, Renal US IMPRESSION: Hypoechoic lesion in the upper pole of right kidney which corresponds to the cystic lesion identified on recent CT. This may represent a complex cyst or a solid renal mass and should be further characterized with a contrast-enhanced MRI on outpatient basis.    HTN: controlled  DW PMD

## 2021-04-18 NOTE — PROGRESS NOTE ADULT - ASSESSMENT
80 year old female with PMHx of HTN, ANCA vasculitis (dx 20 years ago, been on Azathioprine since then, being followed by Rheum outpt), non smoker, presents with acute 9/10 stabbing, non-radiating chest pain lasting 10-15 mins. with exertional shortness of breath. EKG showing TWI in the lateral and anterior leads. Patient reported recent travel to [place] in car (~10 hrs ride).     # Acute pulmonary embolus, first episode:  Prolonged car ride and recent sedentary life style during pandemic. no family hx.  CTa chest reviewed. large pulm masses b/l noted: infectious vs. inflammatory. vs. malignancy induced   Pulm Consulted. s/p biopsy/VATs 4/16/21. prelimp likely malignancy, but will wait for final results  LE dopplers: +DVT.   TTE pending.   CT-ICU care appreciated. chest tube removed.   Hep gtt switched to Eliquis.     # hx ANCA vasculitis:   Chronic, stable, hold home medication regime Azathioprine 150MG daily while waiting for biopsy results.  can likely resume if no infection. rheum eval appreciated.     # upper pole of right kidney: complex cyst? outpt MRI. also biopsy of lung mass as above.    # HTN: monitor BP    Physical therapy. Out of bed to chair with assistance. monitor O2 status.   # DVT ppx: On Hep gtt --> Eliquis  d/w the daughter Leeann and pt. states all info can go through her, she will inform the rest of the family members.     - Dr. MARY Arias ([facility])  - [phone]

## 2021-04-18 NOTE — PROGRESS NOTE ADULT - SUBJECTIVE AND OBJECTIVE BOX
PROCEDURE: Left VATS, Left upper lobe wedge resection 16-Apr-2021       ISSUES:   Acute PE (RML RLL Pulm arteries)  Lung mass  MASON  Post op pain  Bilateral DVT (Peroneal and Tibia)  ANCA Vasculitis  HTN      INTERVAL EVENTS:   Chest tube removed yesterday.  On 2L O2  Remains on heparin gtt for PE. Can transition to oral agent per Thoracic Surgeon.  MASON resolved.    HISTORY:   Patient reports moderate pain at chest wall incision sites which is worse with coughing and deep breathing without associated fever or dyspnea. Pain is improved with use of pain meds.     PHYSICAL EXAM:   Gen: Comfortable, No acute distress  Eyes: Sclera white, Conjunctiva normal, Eyelids normal, Pupils symmetrical   ENT: Mucous membranes moist,  ,  ,    Neck: Trachea midline,  ,  ,  ,  ,    CV: Rate regular, Rhythm regular,  ,  ,    Resp: Breath sounds clear, No accessory muscles use, L chest tube in place,    Abd: Soft, Non-distended, Non-tender, Bowel sounds normal,  ,  ,    Skin: Warm, No peripheral edema of lower extremities,  ,    : No lopez  Neuro: Moving all 4 extremities,    Psych: A&Ox3      ASSESSMENT AND PLAN:     NEURO:  Post-operative Pain - Pain control with Tylenol PRN.        RESPIRATORY:  Hypoxia - Wean nasal cannula for goal O2sat above 92. Obtain CXR. Incentive spirometry. Chest PT and frequent suctioning. Continue bronchodilators. OOB to chair & ambulate w/ assistance. Continuous pulse oximetry for support & to prevent decompensation.           Acute Pulmonary Embolism and Bilateral peroneal DVT - stable. heparin gtt. Can transition to oral agent per Thoracic Surgeon.         CARDIOVASCULAR:  Hemodynamically stable - Not on pressors. Continue hemodynamic monitoring.  HTN - stable. Hold home antihypertensives for now.        ANCA vasculitis - Stable. Continue azathioprine 150mg qday.             RENAL:  MASON - Resolved. Monitor IOs and electrolytes. Keep K above 4.0 and Mg above 2.0.     R renal infarct: CT showing possible R renal infarct, No complaints of R flank pain or urinary symptoms, Renal US IMPRESSION: Hypoechoic lesion in the upper pole of right kidney which corresponds to the cystic lesion identified on recent CT. This may represent a complex cyst or a solid renal mass and should be further characterized with a contrast-enhanced MRI on outpatient basis.      GASTROINTESTINAL:  GI prophylaxis not indicated  Zofran and Reglan IV PRN for nausea  Regular consistency diet            HEMATOLOGIC:  No signs of active bleeding. Monitor Hgb in CBC in AM  On therapeutic anticoagulation.         INFECTIOUS DISEASE:  All surgical sites appear clean. No signs of active infection. Will monitor for fever and leukocytosis.          ENDOCRINE:  Stable – Monitor glucose fingersticks for goal 120-180.            Pertinent clinical, laboratory, radiographic, hemodynamic, echocardiographic, respiratory data, microbiologic data and chart were reviewed by myself and analyzed frequently throughout the course of the day and night by myself.    Plan discussed at length with the CTICU staff and Attending CT Surgeon.     Patient's status was discussed with patient at bedside.  _________________________  VITAL SIGNS:  Vital Signs Last 24 Hrs  T(C): 36.2 (18 Apr 2021 08:00), Max: 36.8 (17 Apr 2021 20:00)  T(F): 97.2 (18 Apr 2021 08:00), Max: 98.2 (17 Apr 2021 20:00)  HR: 84 (18 Apr 2021 09:00) (71 - 106)  BP: 142/48 (18 Apr 2021 08:00) (102/86 - 164/52)  BP(mean): 70 (18 Apr 2021 08:00) (58 - 90)  RR: 18 (18 Apr 2021 09:00) (16 - 26)  SpO2: 95% (18 Apr 2021 09:00) (93% - 97%)  I/Os:   I&O's Detail    17 Apr 2021 07:01  -  18 Apr 2021 07:00  --------------------------------------------------------  IN:    Heparin: 146.5 mL    Lactated Ringers: 600 mL  Total IN: 746.5 mL    OUT:    Chest Tube (mL): 0 mL    Indwelling Catheter - Urethral (mL): 1205 mL  Total OUT: 1205 mL    Total NET: -458.5 mL      18 Apr 2021 07:01  -  18 Apr 2021 11:30  --------------------------------------------------------  IN:    Heparin: 30 mL  Total IN: 30 mL    OUT:    Indwelling Catheter - Urethral (mL): 195 mL  Total OUT: 195 mL    Total NET: -165 mL              MEDICATIONS:  MEDICATIONS  (STANDING):  acetaminophen   Tablet .. 650 milliGRAM(s) Oral every 6 hours  citalopram 10 milliGRAM(s) Oral daily  heparin  Infusion 700 Unit(s)/Hr (7.5 mL/Hr) IV Continuous <Continuous>  lidocaine   Patch 1 Patch Transdermal every 24 hours  polyethylene glycol 3350 17 Gram(s) Oral daily  simvastatin 10 milliGRAM(s) Oral at bedtime    MEDICATIONS  (PRN):  ondansetron Injectable 4 milliGRAM(s) IV Push every 6 hours PRN Nausea  traMADol 50 milliGRAM(s) Oral every 6 hours PRN Moderate Pain (4 - 6)      LABS:                        12.3   6.47  )-----------( 137      ( 18 Apr 2021 03:41 )             36.8     04-18    130<L>  |  100  |  23  ----------------------------<  93  4.5   |  22  |  0.78    Ca    8.1<L>      18 Apr 2021 03:41  Phos  1.9     04-18  Mg     2.0     04-18        PT/INR - ( 17 Apr 2021 05:55 )   PT: 12.1 sec;   INR: 1.05 ratio         PTT - ( 18 Apr 2021 03:41 )  PTT:74.0 sec      _________________________

## 2021-04-18 NOTE — PROGRESS NOTE ADULT - SUBJECTIVE AND OBJECTIVE BOX
CC: feels well, no complaints    TELEMETRY: nsr    PHYSICAL EXAM:    T(C): 36.7 (04-18-21 @ 04:00), Max: 36.8 (04-17-21 @ 20:00)  HR: 106 (04-18-21 @ 06:00) (71 - 106)  BP: 141/61 (04-18-21 @ 06:00) (102/86 - 164/52)  RR: 24 (04-18-21 @ 06:00) (16 - 26)  SpO2: 94% (04-18-21 @ 06:00) (93% - 97%)  Wt(kg): --  I&O's Summary    17 Apr 2021 07:01  -  18 Apr 2021 07:00  --------------------------------------------------------  IN: 746.5 mL / OUT: 1205 mL / NET: -458.5 mL        Appearance: Normal	  Cardiovascular: Normal S1 S2,RRR, No JVD, No murmurs  Respiratory: Lungs clear to auscultation	  Gastrointestinal:  Soft, Non-tender, + BS	  Extremities: Normal range of motion, No clubbing, cyanosis or edema  Vascular: Peripheral pulses palpable 2+ bilaterally     LABS:	 	                          12.3   6.47  )-----------( 137      ( 18 Apr 2021 03:41 )             36.8     04-18    130<L>  |  100  |  23  ----------------------------<  93  4.5   |  22  |  0.78    Ca    8.1<L>      18 Apr 2021 03:41  Phos  1.9     04-18  Mg     2.0     04-18        PT/INR - ( 17 Apr 2021 05:55 )   PT: 12.1 sec;   INR: 1.05 ratio         PTT - ( 18 Apr 2021 03:41 )  PTT:74.0 sec    CARDIAC MARKERS:

## 2021-04-18 NOTE — PROGRESS NOTE ADULT - SUBJECTIVE AND OBJECTIVE BOX
Date of Service: 04-18-21 @ 11:24    Patient is a 80y old  Female who presents with a chief complaint of chest pain (18 Apr 2021 09:21)      Any change in ROS: Doingo k : on oxygen : no pain     MEDICATIONS  (STANDING):  acetaminophen   Tablet .. 650 milliGRAM(s) Oral every 6 hours  citalopram 10 milliGRAM(s) Oral daily  heparin  Infusion 700 Unit(s)/Hr (7.5 mL/Hr) IV Continuous <Continuous>  lidocaine   Patch 1 Patch Transdermal every 24 hours  polyethylene glycol 3350 17 Gram(s) Oral daily  simvastatin 10 milliGRAM(s) Oral at bedtime    MEDICATIONS  (PRN):  ondansetron Injectable 4 milliGRAM(s) IV Push every 6 hours PRN Nausea  traMADol 50 milliGRAM(s) Oral every 6 hours PRN Moderate Pain (4 - 6)    Vital Signs Last 24 Hrs  T(C): 36.2 (18 Apr 2021 08:00), Max: 36.8 (17 Apr 2021 20:00)  T(F): 97.2 (18 Apr 2021 08:00), Max: 98.2 (17 Apr 2021 20:00)  HR: 84 (18 Apr 2021 09:00) (71 - 106)  BP: 142/48 (18 Apr 2021 08:00) (102/86 - 164/52)  BP(mean): 70 (18 Apr 2021 08:00) (58 - 90)  RR: 18 (18 Apr 2021 09:00) (16 - 26)  SpO2: 95% (18 Apr 2021 09:00) (93% - 97%)    I&O's Summary    17 Apr 2021 07:01  -  18 Apr 2021 07:00  --------------------------------------------------------  IN: 746.5 mL / OUT: 1205 mL / NET: -458.5 mL    18 Apr 2021 07:01  -  18 Apr 2021 11:24  --------------------------------------------------------  IN: 30 mL / OUT: 195 mL / NET: -165 mL          Physical Exam:   GENERAL: Obese  HEENT: RANJIT/   Atraumatic, Normocephalic  ENMT: No tonsillar erythema, exudates, or enlargement; Moist mucous membranes, Good dentition, No lesions  NECK: Supple, No JVD, Normal thyroid  CHEST/LUNG: Clear to auscultaion, ; No rales, rhonchi, wheezing, or rubs  CVS: Regular rate and rhythm; No murmurs, rubs, or gallops  GI: : Soft, Nontender, Nondistended; Bowel sounds present  NERVOUS SYSTEM:  Alert & Oriented X3  EXTREMITIES:  2+ Peripheral Pulses, No clubbing, cyanosis, or edema  LYMPH: No lymphadenopathy noted  SKIN: No rashes or lesions  ENDOCRINOLOGY: No Thyromegaly  PSYCH: Appropriate    Labs:                              12.3   6.47  )-----------( 137      ( 18 Apr 2021 03:41 )             36.8                         12.9   6.11  )-----------( 143      ( 17 Apr 2021 05:55 )             39.7                         13.5   5.85  )-----------( 160      ( 16 Apr 2021 06:29 )             40.6                         13.9   6.08  )-----------( 166      ( 15 Apr 2021 22:16 )             39.3                         13.6   5.74  )-----------( 165      ( 15 Apr 2021 07:23 )             39.9     04-18    130<L>  |  100  |  23  ----------------------------<  93  4.5   |  22  |  0.78  04-17    136  |  102  |  30<H>  ----------------------------<  84  4.5   |  19<L>  |  1.20  04-16    134<L>  |  100  |  15  ----------------------------<  107<H>  4.9   |  23  |  0.91  04-15    134<L>  |  100  |  15  ----------------------------<  104<H>  4.0   |  22  |  0.78    Ca    8.1<L>      18 Apr 2021 03:41  Ca    8.5      17 Apr 2021 05:55  Phos  1.9     04-18  Phos  6.5     04-17  Mg     2.0     04-18  Mg     2.2     04-17      CAPILLARY BLOOD GLUCOSE            PT/INR - ( 17 Apr 2021 05:55 )   PT: 12.1 sec;   INR: 1.05 ratio         PTT - ( 18 Apr 2021 03:41 )  PTT:74.0 sec          RECENT CULTURES:  04-16 @ 21:17 .Tissue 1. LEFT UPER LOBE NODULE       No polymorphonuclear cells seen per low power field  No organisms seen per oil power field           No growth    r< from: Xray Chest 1 View- PORTABLE-Urgent (Xray Chest 1 View- PORTABLE-Urgent .) (04.17.21 @ 11:35) >  PROCEDURE DATE:  Apr 17 2021         INTERPRETATION:  CLINICAL INDICATION: follow-up status post left VATS and chest tube removal    EXAM:  Frontal views of the chest from 4/17/2021 at 0553 and 1135. Compared to prior study from 4/16/2021.    Rotated right.    IMPRESSION:  Left pleural chest tube removed with questionable residual tiny medial left apical pneumothorax.    Residual small amount of lower lateral left chest wall subcutaneous emphysema also noted.    Redemonstrated ill-defined patchy masslike opacification in peripheral left midlung abutting lateral chest wall pleural surface. Previously seen medial right upper lung masslike density on CT from 4/13/2021 not currently well demonstrated radiographically.    Clear remaining visualized lungs. No pleural effusions.    Stable cardiac and mediastinal silhouettes.    Trachea midline.              CORINA OSULLIVAN MD; Attending Radiologist  This document has been electronically signed. Apr 17 2021  3:30PM    < end of copied text >        RESPIRATORY CULTURES:          Studies  Chest X-RAY  CT SCAN Chest   Venous Dopplers: LE:   CT Abdomen  Others      r< from: US Duplex Venous Lower Ext Complete, Bilateral (04.14.21 @ 15:27) >  TE:  Apr 14 2021         INTERPRETATION:  CLINICAL INFORMATION: Patient on heparin, pulmonary emboli.    COMPARISON: None available.    TECHNIQUE: Duplex sonography of the BILATERAL LOWER extremity veins with color and spectral Doppler, with and without compression.    FINDINGS:    RIGHT:  Normal compressibility of the RIGHT common femoral, femoral and popliteal veins.  Doppler examination shows normal spontaneous and phasic flow.  Posterior tibial and peroneal veins demonstrate lack of color Doppler flow and are incompressible.    LEFT:  Normal compressibility of the LEFT common femoral, femoral and popliteal veins.  Doppler examination shows normal spontaneous and phasic flow.  Posterior tibial and peroneal veins demonstrate lack of color Doppler flow and are incompressible.    IMPRESSION:  Bilateral below the knee deep venous thrombosis.  Findings discussed with DEEPA Chong at 5:00 PM on 4/14/2021 with read back.                MASTER BOGGS MD; Attending Radiologist  This document has been electronically signed. Apr 14 2021  4:59PM    < end of copied text >

## 2021-04-18 NOTE — PROGRESS NOTE ADULT - SUBJECTIVE AND OBJECTIVE BOX
SUBJECTIVE / OVERNIGHT EVENTS:  No events. Feel well.  no complaints. out of bed in chair  Large BM this am, resolved constipation  no cp, no sob, no n/v/d.  no abd pain.   the daughter Leeann at bedside.   Hep gtt switched to Eliquis     --------------------------------------------------------------------------------------------  LABS:                        12.3   6.47  )-----------( 137      ( 18 Apr 2021 03:41 )             36.8     04-18    130<L>  |  100  |  23  ----------------------------<  93  4.5   |  22  |  0.78    Ca    8.1<L>      18 Apr 2021 03:41  Phos  1.9     04-18  Mg     2.0     04-18      PT/INR - ( 17 Apr 2021 05:55 )   PT: 12.1 sec;   INR: 1.05 ratio         PTT - ( 18 Apr 2021 03:41 )  PTT:74.0 sec  CAPILLARY BLOOD GLUCOSE                RADIOLOGY & ADDITIONAL TESTS:    Imaging Personally Reviewed:  [x] YES  [ ] NO    Consultant(s) Notes Reviewed:  [x] YES  [ ] NO    MEDICATIONS  (STANDING):  acetaminophen   Tablet .. 650 milliGRAM(s) Oral every 6 hours  apixaban 10 milliGRAM(s) Oral two times a day  citalopram 10 milliGRAM(s) Oral daily  lidocaine   Patch 1 Patch Transdermal every 24 hours  polyethylene glycol 3350 17 Gram(s) Oral daily  simvastatin 10 milliGRAM(s) Oral at bedtime    MEDICATIONS  (PRN):  ondansetron Injectable 4 milliGRAM(s) IV Push every 6 hours PRN Nausea  traMADol 50 milliGRAM(s) Oral every 6 hours PRN Moderate Pain (4 - 6)      Care Discussed with Consultants/Other Providers [x] YES  [ ] NO    Vital Signs Last 24 Hrs  T(C): 36.1 (18 Apr 2021 12:00), Max: 36.8 (17 Apr 2021 20:00)  T(F): 96.9 (18 Apr 2021 12:00), Max: 98.2 (17 Apr 2021 20:00)  HR: 86 (18 Apr 2021 17:00) (77 - 106)  BP: 113/45 (18 Apr 2021 17:00) (102/86 - 164/52)  BP(mean): 65 (18 Apr 2021 17:00) (58 - 96)  RR: 19 (18 Apr 2021 17:00) (17 - 26)  SpO2: 98% (18 Apr 2021 17:00) (92% - 98%)  I&O's Summary    17 Apr 2021 07:01  -  18 Apr 2021 07:00  --------------------------------------------------------  IN: 746.5 mL / OUT: 1205 mL / NET: -458.5 mL    18 Apr 2021 07:01  -  18 Apr 2021 19:28  --------------------------------------------------------  IN: 45 mL / OUT: 195 mL / NET: -150 mL      PHYSICAL EXAM:  GENERAL: NAD, well-developed, comfortable, on room air   HEAD:  Atraumatic, Normocephalic  EYES: EOMI, PERRLA, conjunctiva and sclera clear  NECK: Supple, No JVD  CHEST/LUNG: mild decrease breath sounds bilaterally; No wheeze   HEART: Regular rate and rhythm; No murmurs, rubs, or gallops  ABDOMEN: Soft, Nontender, Nondistended; Bowel sounds present  Neuro: AAOx3, no focal weakness, 5/5 b/l extremity strength  EXTREMITIES:  2+ Peripheral Pulses, No clubbing, cyanosis, or edema  SKIN: No rashes or lesions

## 2021-04-19 LAB
4/8 RATIO: 1.93 RATIO — SIGNIFICANT CHANGE UP (ref 0.86–4.14)
ABS CD8: 301 /UL — SIGNIFICANT CHANGE UP (ref 90–775)
ACE SERPL-CCNC: 82 U/L — SIGNIFICANT CHANGE UP (ref 14–82)
ANION GAP SERPL CALC-SCNC: 11 MMOL/L — SIGNIFICANT CHANGE UP (ref 7–14)
APTT BLD: 34.9 SEC — SIGNIFICANT CHANGE UP (ref 27–36.3)
BUN SERPL-MCNC: 17 MG/DL — SIGNIFICANT CHANGE UP (ref 7–23)
CALCIUM SERPL-MCNC: 8.5 MG/DL — SIGNIFICANT CHANGE UP (ref 8.4–10.5)
CD16+CD56+ CELLS NFR BLD: 7 % — SIGNIFICANT CHANGE UP (ref 7–27)
CD16+CD56+ CELLS NFR SPEC: 62 /UL — LOW (ref 80–426)
CD19 BLASTS SPEC-ACNC: 2 /UL — LOW (ref 32–326)
CD19 BLASTS SPEC-ACNC: <1 % — LOW (ref 4–18)
CD3 BLASTS SPEC-ACNC: 871 /UL — SIGNIFICANT CHANGE UP (ref 396–2024)
CD3 BLASTS SPEC-ACNC: 91 % — HIGH (ref 58–84)
CD4 %: 59 % — HIGH (ref 30–56)
CD8 %: 30 % — SIGNIFICANT CHANGE UP (ref 11–43)
CHLORIDE SERPL-SCNC: 102 MMOL/L — SIGNIFICANT CHANGE UP (ref 98–107)
CO2 SERPL-SCNC: 23 MMOL/L — SIGNIFICANT CHANGE UP (ref 22–31)
CREAT SERPL-MCNC: 0.64 MG/DL — SIGNIFICANT CHANGE UP (ref 0.5–1.3)
GLUCOSE SERPL-MCNC: 107 MG/DL — HIGH (ref 70–99)
HCT VFR BLD CALC: 37.3 % — SIGNIFICANT CHANGE UP (ref 34.5–45)
HGB BLD-MCNC: 12.7 G/DL — SIGNIFICANT CHANGE UP (ref 11.5–15.5)
INR BLD: 1.67 RATIO — HIGH (ref 0.88–1.16)
MAGNESIUM SERPL-MCNC: 1.8 MG/DL — SIGNIFICANT CHANGE UP (ref 1.6–2.6)
MCHC RBC-ENTMCNC: 34 GM/DL — SIGNIFICANT CHANGE UP (ref 32–36)
MCHC RBC-ENTMCNC: 35.2 PG — HIGH (ref 27–34)
MCV RBC AUTO: 103.3 FL — HIGH (ref 80–100)
MPO AB + PR3 PNL SER: SIGNIFICANT CHANGE UP
NRBC # BLD: 0 /100 WBCS — SIGNIFICANT CHANGE UP
NRBC # FLD: 0 K/UL — SIGNIFICANT CHANGE UP
PHOSPHATE SERPL-MCNC: 2 MG/DL — LOW (ref 2.5–4.5)
PLATELET # BLD AUTO: 161 K/UL — SIGNIFICANT CHANGE UP (ref 150–400)
POTASSIUM SERPL-MCNC: 4.4 MMOL/L — SIGNIFICANT CHANGE UP (ref 3.5–5.3)
POTASSIUM SERPL-SCNC: 4.4 MMOL/L — SIGNIFICANT CHANGE UP (ref 3.5–5.3)
PROTHROM AB SERPL-ACNC: 18.7 SEC — HIGH (ref 10.6–13.6)
RBC # BLD: 3.61 M/UL — LOW (ref 3.8–5.2)
RBC # FLD: 14.4 % — SIGNIFICANT CHANGE UP (ref 10.3–14.5)
SODIUM SERPL-SCNC: 136 MMOL/L — SIGNIFICANT CHANGE UP (ref 135–145)
T-CELL CD4 SUBSET PNL BLD: 582 /UL — SIGNIFICANT CHANGE UP (ref 325–1251)
WBC # BLD: 6.96 K/UL — SIGNIFICANT CHANGE UP (ref 3.8–10.5)
WBC # FLD AUTO: 6.96 K/UL — SIGNIFICANT CHANGE UP (ref 3.8–10.5)

## 2021-04-19 PROCEDURE — 71045 X-RAY EXAM CHEST 1 VIEW: CPT | Mod: 26

## 2021-04-19 PROCEDURE — 99233 SBSQ HOSP IP/OBS HIGH 50: CPT

## 2021-04-19 RX ORDER — MAGNESIUM SULFATE 500 MG/ML
2 VIAL (ML) INJECTION ONCE
Refills: 0 | Status: COMPLETED | OUTPATIENT
Start: 2021-04-19 | End: 2021-04-19

## 2021-04-19 RX ADMIN — Medication 650 MILLIGRAM(S): at 03:00

## 2021-04-19 RX ADMIN — Medication 650 MILLIGRAM(S): at 18:21

## 2021-04-19 RX ADMIN — APIXABAN 10 MILLIGRAM(S): 2.5 TABLET, FILM COATED ORAL at 18:21

## 2021-04-19 RX ADMIN — LIDOCAINE 1 PATCH: 4 CREAM TOPICAL at 19:27

## 2021-04-19 RX ADMIN — CITALOPRAM 10 MILLIGRAM(S): 10 TABLET, FILM COATED ORAL at 11:41

## 2021-04-19 RX ADMIN — Medication 650 MILLIGRAM(S): at 23:08

## 2021-04-19 RX ADMIN — LIDOCAINE 1 PATCH: 4 CREAM TOPICAL at 22:42

## 2021-04-19 RX ADMIN — SIMVASTATIN 10 MILLIGRAM(S): 20 TABLET, FILM COATED ORAL at 23:08

## 2021-04-19 RX ADMIN — Medication 63.75 MILLIMOLE(S): at 06:28

## 2021-04-19 RX ADMIN — Medication 650 MILLIGRAM(S): at 11:41

## 2021-04-19 RX ADMIN — Medication 650 MILLIGRAM(S): at 19:20

## 2021-04-19 RX ADMIN — LIDOCAINE 1 PATCH: 4 CREAM TOPICAL at 11:41

## 2021-04-19 RX ADMIN — Medication 50 GRAM(S): at 06:28

## 2021-04-19 RX ADMIN — Medication 650 MILLIGRAM(S): at 16:38

## 2021-04-19 RX ADMIN — ONDANSETRON 4 MILLIGRAM(S): 8 TABLET, FILM COATED ORAL at 03:10

## 2021-04-19 RX ADMIN — APIXABAN 10 MILLIGRAM(S): 2.5 TABLET, FILM COATED ORAL at 06:28

## 2021-04-19 RX ADMIN — Medication 650 MILLIGRAM(S): at 04:00

## 2021-04-19 NOTE — PROGRESS NOTE ADULT - ASSESSMENT
Patient is a 80 year old female with PMHx of HTN, ANCA vasculitis, non smoker, presents with acute 9/10 stabbing, non-radiating chest pain with exertional shortness of breath. Not associated with n/v and improved when laying down, patient was seen in the urgent care and had EKG showing TWI in the lateral and anterior leads. Patient reported recent travel to [place] in car but no LE edema or hx blood clots.    PE:  Lung masses  R renal infarct: CT showing possible R renal infarct, No complaints of R flank pain or urinary symptoms, Renal US IMPRESSION: Hypoechoic lesion in the upper pole of right kidney which corresponds to the cystic lesion identified on recent CT. This may represent a complex cyst or a solid renal mass and should be further characterized with a contrast-enhanced MRI on outpatient basis.  HTN:  ANCA Vasculitis:    4/14/2021:      PE: on heparin: get dopplers are postive and echo is still pending? noac ?  ANCA Vasculitis: on azathioprine: 150 mg per day : LFTs are normal   Lung masses: DW ROLAND: She has large masses on both sides and small nodule: RUL and LLL: may be exacerbation of vasculitis ?: core biopsy vs vats biopsy : get RHEUM involved and ID involved: ? fungal infection can happen: id consult: she does not look sick   No enlarged axillary lymph nodes. A few enlarged mediastinal lymph nodes with the largest in a precarinal location measuring about 1.3 cm.: dw roland: small : follow up   R renal infarct: CT showing possible R renal infarct, No complaints of R flank pain or urinary symptoms, Renal US IMPRESSION: Hypoechoic lesion in the upper pole of right kidney which corresponds to the cystic lesion identified on recent CT. This may represent a complex cyst or a solid renal mass and should be further characterized with a contrast-enhanced MRI on outpatient basis.  HTN: controlled  DW PMD     4/16:    PE: on heparin: for vats biopsy today   ANCA Vasculitis: on azathioprine: 150 mg per day : LFTs are normal  rheum consult pending  Lung masses: DW ROLAND: She has large masses on both sides and small nodule: RUL and LLL: may be exacerbation of vasculitis ?: core biopsy vs vats biopsy : get RHEUM involved and ID involved: ? fungal infection can happen: id consult: she does not look sick   No enlarged axillary lymph nodes. A few enlarged mediastinal lymph nodes with the largest in a precarinal location measuring about 1.3 cm.: demi roland: small : follow up   R renal infarct: CT showing possible R renal infarct, No complaints of R flank pain or urinary symptoms, Renal US IMPRESSION: Hypoechoic lesion in the upper pole of right kidney which corresponds to the cystic lesion identified on recent CT. This may represent a complex cyst or a solid renal mass and should be further characterized with a contrast-enhanced MRI on outpatient basis.  HTN: controlled   PMD     4/18:      PE/dvt: on heparin: s/p vats biopsy : await path   ANCA Vasculitis: on azathioprine: 150 mg per day : LFTs are normal  rheum consult pending  Lung masses: DW ROLAND: She has large masses on both sides and small nodule: RUL and LLL: may be exacerbation of vasculitis : biopsy done: await pathology   No enlarged axillary lymph nodes. A few enlarged mediastinal lymph nodes with the largest in a precarinal location measuring about 1.3 cm.: demi saraviawilian: small : follow up   R renal infarct: CT showing possible R renal infarct, No complaints of R flank pain or urinary symptoms, Renal US IMPRESSION: Hypoechoic lesion in the upper pole of right kidney which corresponds to the cystic lesion identified on recent CT. This may represent a complex cyst or a solid renal mass and should be further characterized with a contrast-enhanced MRI on outpatient basis.    HTN: controlled   PMD     4/19:      PE/dvt: on Eliquis: s/p vats biopsy : await path : no AFB   ANCA Vasculitis: on azathioprine: 150 mg per day : LFTs are normal  rheum follow p   Lung masses: DW ROLAND: She has large masses on both sides and small nodule: RUL and LLL: may be exacerbation of vasculitis : biopsy done: await pathology   No enlarged axillary lymph nodes. A few enlarged mediastinal lymph nodes with the largest in a precarinal location measuring about 1.3 cm.: demi saraviawilian: small : follow up   R renal infarct: CT showing possible R renal infarct, No complaints of R flank pain or urinary symptoms, Renal US IMPRESSION: Hypoechoic lesion in the upper pole of right kidney which corresponds to the cystic lesion identified on recent CT. This may represent a complex cyst or a solid renal mass and should be further characterized with a contrast-enhanced MRI on outpatient basis.    HTN: controlled   team

## 2021-04-19 NOTE — DIETITIAN INITIAL EVALUATION ADULT. - OTHER INFO
80 year old female with PMHx of HTN, ANCA vasculitis (dx 20 years ago, been on Azathioprine since then, being followed by Rheum outpt), non smoker, presents with acute 9/10 stabbing, non-radiating chest pain lasting 10-15 mins. with exertional shortness of breath. EKG showing TWI in the lateral and anterior leads. Patient reported recent travel to [place] in car (~10 hrs ride).   Pt now S/P  OR for mass of L lung-flex bronch, LUIS wedge resection of L lung with VATS on 4/16.

## 2021-04-19 NOTE — PROGRESS NOTE ADULT - SUBJECTIVE AND OBJECTIVE BOX
JOSIBETTIE      80y   Female   MRN-[id number]         codeine (Nausea)  penicillin (Hives)             Daily     Daily Drug Dosing Weight  Height (cm): 152.4 (16 Apr 2021 05:47)  Weight (kg): 69.2 (16 Apr 2021 05:47)  BMI (kg/m2): 29.8 (16 Apr 2021 05:47)  BSA (m2): 1.66 (16 Apr 2021 05:47)    HPI:  Patient is a 80 year old female with PMHx of HTN, ANCA vasculitis, non smoker, presents with acute 9/10 stabbing, non-radiating chest pain with exertional shortness of breath. Not associated with n/v and improved when laying down, patient was seen in the urgent care and had EKG showing TWI in the lateral and anterior leads. Patient reported recent travel to [place] in car but no LE edema or hx blood clots. (14 Apr 2021 10:47)      Procedure:  Left VATS, Left upper lobe wedge resection 16-Apr-2021                        Issues:  Acute PE (RML RLL Pulm arteries)  Lung mass  MASON  Post op pain  Bilateral DVT (Peroneal and Tibia)  ANCA Vasculitis  HTN                 Home Medications:    PAST MEDICAL & SURGICAL HISTORY:  Vasculitis    ANCA-associated vasculitis    No significant past surgical history      Vital Signs Last 24 Hrs  T(C): 36.3 (19 Apr 2021 09:00), Max: 37.7 (19 Apr 2021 00:00)  T(F): 97.4 (19 Apr 2021 09:00), Max: 99.8 (19 Apr 2021 00:00)  HR: 94 (19 Apr 2021 09:13) (77 - 107)  BP: 142/49 (19 Apr 2021 09:00) (105/72 - 167/78)  BP(mean): 70 (19 Apr 2021 09:00) (58 - 99)  RR: 24 (19 Apr 2021 09:13) (17 - 27)  SpO2: 91% (19 Apr 2021 09:13) (87% - 98%)  I&O's Detail    18 Apr 2021 07:01  -  19 Apr 2021 07:00  --------------------------------------------------------  IN:    Heparin: 45 mL  Total IN: 45 mL    OUT:    Indwelling Catheter - Urethral (mL): 195 mL    Voided (mL): 300 mL  Total OUT: 495 mL    Total NET: -450 mL      19 Apr 2021 07:01  -  19 Apr 2021 09:22  --------------------------------------------------------  IN:    Oral Fluid: 225 mL  Total IN: 225 mL    OUT:  Total OUT: 0 mL    Total NET: 225 mL    CAPILLARY BLOOD GLUCOSE    Home Medications:      MEDICATIONS  (STANDING):  acetaminophen   Tablet .. 650 milliGRAM(s) Oral every 6 hours  apixaban 10 milliGRAM(s) Oral two times a day  citalopram 10 milliGRAM(s) Oral daily  lidocaine   Patch 1 Patch Transdermal every 24 hours  polyethylene glycol 3350 17 Gram(s) Oral daily  simvastatin 10 milliGRAM(s) Oral at bedtime    MEDICATIONS  (PRN):  ondansetron Injectable 4 milliGRAM(s) IV Push every 6 hours PRN Nausea  traMADol 50 milliGRAM(s) Oral every 6 hours PRN Moderate Pain (4 - 6)        Physical exam:                             General:               Pt is awake, alert,  appears to be in pain but not in  distress                                                  Neuro:                  Nonfocal                             Cardiovascular:   S1 & S2, regular                           Respiratory:         Air entry is fair and equal on both sides, has bilateral conducted sounds                           GI:                          Soft, nondistended and nontender, Bowel sounds active                            Ext:                        No cyanosis or edema     Labs:                                                                           12.7   6.96  )-----------( 161      ( 19 Apr 2021 03:47 )             37.3             04-19    136  |  102  |  17  ----------------------------<  107<H>  4.4   |  23  |  0.64    Ca    8.5      19 Apr 2021 03:47  Phos  2.0     04-19  Mg     1.8     04-19                    PT/INR - ( 19 Apr 2021 03:47 )   PT: 18.7 sec;   INR: 1.67 ratio    PTT - ( 19 Apr 2021 03:47 )  PTT:34.9 sec      Culture - Acid Fast - Tissue w/Smear (collected 16 Apr 2021 21:17)  Source: .Tissue 1. LEFT UPER LOBE NODULE    Culture - Fungal, Tissue (collected 16 Apr 2021 21:17)  Source: .Tissue 1. LEFT UPER LOBE NODULE  Preliminary Report (19 Apr 2021 09:04):    Testing in progress    Culture - Tissue with Gram Stain (collected 16 Apr 2021 21:17)  Source: .Tissue 1. LEFT UPER LOBE NODULE  Gram Stain (16 Apr 2021 23:45):    No polymorphonuclear cells seen per low power field    No organisms seen per oil power field  Preliminary Report (17 Apr 2021 18:25):    No growth        CXR:    < from: Xray Chest 1 View- PORTABLE-Routine (Xray Chest 1 View- PORTABLE-Routine in AM.) (04.19.21 @ 04:29) >  Pleural-based peripheral mass seen in the left upper lobe unaltered from the study of the day before. Small amount of subcutaneous emphysema remains. Lungs are otherwise clear, the heart is not enlarged and there is no effusion or pneumothorax.    COMPARISON:  April 18    IMPRESSION:  Status post left thoracotomy with peripheral left upper lobe mass like opacity.        Plan:    General: 80yFemale s/p Left VATS, Left upper lobe wedge resection 16-Apr-2021 , experiencing  pain with deep breathing.                             Neuro:                                         Pain control with Tramadol /  Tylenol PRN                            Cardiovascular:                                          HTN: Continue hemodynamic monitoring. Not on ant-hypertensive meds at this time    HLD: On Zocar    PE / DVT: Continue Eliquis  2D Echo  Needs O2 via NC                            Respiratory:                                         Pt is on 2L  nasal canula, desaturated when off                                          Comfortable, not in any distress.                                         Encourage incentive spirometry     PE / DVT: Continue Eliquis                                                                Continue bronchodilators, pulmonary toilet - PRN                            GI                                         On  DASH  diet as tolerated                                         Continue Zofran / Reglan for nausea - PRN	                                                                 Renal:                                                                                  Monitor I/Os and electrolytes                                                                                        Hem/ Onc:                                                                                  No signs of bleeding.                                                                   Infectious disease:                                            Monitor for fever / leukocytosis.                                          All surgical incision / chest tube  sites look clean                            Endocrine                                             Continue Accu-Checks with coverage    ANCA Vasculitis:        Currently off Azathioprine pending biopsy results       Rheumatology f/u      Pertinent clinical, laboratory, radiographic, hemodynamic, echocardiographic, respiratory data, microbiologic data and chart were reviewed and analyzed frequently throughout the course of the day and night  Patient seen, examined and plan discussed with CT Surgeon Dr. Pettit / CTICU team during rounds.    Status discussed with patient /  updated plan of care    Awaiting TTE /  transfer to Medicine service / discharge home on O2            Isra Garber MD

## 2021-04-19 NOTE — PROGRESS NOTE ADULT - ASSESSMENT
80 year old female with PMHx of HTN, ANCA vasculitis, Former smoker, presents with acute 9/10 stabbing, non-radiating chest pain with exertional shortness of breath with twi on ecg;  CTa revealed acute pe     1. Atypical chest pain   -in the setting of acute PE/ lung mass   -Ecg with twi noted to anterolateral leads  -HS trop neg  -ER echo with normal lv fxn  -echo pending    2. 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 mass or cyst  -pulm following , med f/u   -Echo pending  -on Eliquis     3. Lung Mass, Mediastinal lymphadenopathy  -CT Angio revealed large b/l pulmonary masses.  -s/p  LVATS, Lung resection   -CTS f/u     4. ANCA Vasculitis  -med , id f/u  80 year old female with PMHx of HTN, ANCA vasculitis, Former smoker, presents with acute 9/10 stabbing, non-radiating chest pain with exertional shortness of breath with twi on ecg;  CTa revealed acute pe     1. Atypical chest pain   -in the setting of acute PE/ lung mass   -Ecg with twi noted to anterolateral leads  -HS trop neg  -ER echo with normal lv fxn  -full echo pending    2. 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 mass or cyst  -pulm following , med f/u   -Echo pending  -on Eliquis     3. Lung Mass, Mediastinal lymphadenopathy  -CT Angio revealed large b/l pulmonary masses.  -s/p  LVATS, Lung resection   -CTS f/u     4. ANCA Vasculitis  -med , id f/u

## 2021-04-19 NOTE — PROGRESS NOTE ADULT - SUBJECTIVE AND OBJECTIVE BOX
CARDIOLOGY FOLLOW UP - Dr. Hooker  DATE OF SERVICE: 4/19/21     CC no cp or sob       REVIEW OF SYSTEMS:  CONSTITUTIONAL: No fever, weight loss, or fatigue  RESPIRATORY: No cough, wheezing, chills or hemoptysis; No Shortness of Breath  CARDIOVASCULAR: No chest pain, palpitations, passing out, dizziness, or leg swelling  GASTROINTESTINAL: No abdominal or epigastric pain. No nausea, vomiting, or hematemesis; No diarrhea or constipation. No melena or hematochezia.  VASCULAR: No edema     PHYSICAL EXAM:  T(C): 36.1 (04-19-21 @ 16:00), Max: 37.7 (04-19-21 @ 00:00)  HR: 86 (04-19-21 @ 16:00) (80 - 107)  BP: 139/48 (04-19-21 @ 16:00) (125/70 - 167/78)  RR: 20 (04-19-21 @ 16:00) (19 - 27)  SpO2: 94% (04-19-21 @ 16:00) (87% - 97%)  Wt(kg): --  I&O's Summary    18 Apr 2021 07:01  -  19 Apr 2021 07:00  --------------------------------------------------------  IN: 45 mL / OUT: 495 mL / NET: -450 mL    19 Apr 2021 07:01  -  19 Apr 2021 17:10  --------------------------------------------------------  IN: 225 mL / OUT: 0 mL / NET: 225 mL        Appearance: Normal	  Cardiovascular: Normal S1 S2,RRR, No JVD, No murmurs  Respiratory: Lungs clear to auscultation	  Gastrointestinal:  Soft, Non-tender, + BS	  Extremities: Normal range of motion, No clubbing, cyanosis or edema      Home Medications:      MEDICATIONS  (STANDING):  acetaminophen   Tablet .. 650 milliGRAM(s) Oral every 6 hours  apixaban 10 milliGRAM(s) Oral two times a day  citalopram 10 milliGRAM(s) Oral daily  lidocaine   Patch 1 Patch Transdermal every 24 hours  polyethylene glycol 3350 17 Gram(s) Oral daily  simvastatin 10 milliGRAM(s) Oral at bedtime      TELEMETRY: nsr	    ECG:  	  RADIOLOGY:   DIAGNOSTIC TESTING:  [ ] Echocardiogram:  [ ]  Catheterization:  [ ] Stress Test:    OTHER: 	    LABS:	 	    Troponin T, High Sensitivity Result: 7 ng/L (04-13 @ 16:14)  Troponin T, High Sensitivity Result: 6 ng/L (04-13 @ 13:36)                          12.7   6.96  )-----------( 161      ( 19 Apr 2021 03:47 )             37.3     04-19    136  |  102  |  17  ----------------------------<  107<H>  4.4   |  23  |  0.64    Ca    8.5      19 Apr 2021 03:47  Phos  2.0     04-19  Mg     1.8     04-19      PT/INR - ( 19 Apr 2021 03:47 )   PT: 18.7 sec;   INR: 1.67 ratio         PTT - ( 19 Apr 2021 03:47 )  PTT:34.9 sec

## 2021-04-19 NOTE — PROGRESS NOTE ADULT - ASSESSMENT
80 year old female with PMHx of HTN, ANCA vasculitis (dx 20 years ago, been on Azathioprine since then, being followed by Rheum outpt), non smoker, presents with acute 9/10 stabbing, non-radiating chest pain lasting 10-15 mins. with exertional shortness of breath. EKG showing TWI in the lateral and anterior leads. Patient reported recent travel to [place] in car (~10 hrs ride).     # Acute pulmonary embolus, first episode:  Prolonged car ride and recent sedentary life style during pandemic. no family hx.  CTa chest reviewed. large pulm masses b/l noted: infectious vs. inflammatory. vs. malignancy induced   Pulm Consulted. s/p biopsy/VATs 4/16/21. prelimp likely malignancy, but will wait for final results  LE dopplers: +DVT.   TTE pending.   CT-ICU care appreciated. chest tube removed.   Hep gtt switched to Eliquis. Tolerating.  Check spO2 on room air and on exertion to see if the pt needs home O2.     # hx ANCA vasculitis:   Chronic, stable, hold home medication regime Azathioprine 150MG daily while waiting for biopsy results.  can likely resume if no infection. rheum eval appreciated.     # upper pole of right kidney: complex cyst? outpt MRI. also biopsy of lung mass as above.    # HTN: monitor BP    Physical therapy. Out of bed to chair with assistance. monitor O2 status.   # DVT ppx: On Hep gtt --> Eliquis  d/w the daughter Leeann and pt, all info can go to her (the daughter), she will inform the rest of the family members.     - Dr. MARY Arias ([employer])  - [phone]

## 2021-04-19 NOTE — DIETITIAN INITIAL EVALUATION ADULT. - ADD RECOMMEND
1) Monitor weights, labs, BM's, skin integrity, p.o. intake. 2) Continue to encourage and assist pt w/meals.

## 2021-04-19 NOTE — CHART NOTE - NSCHARTNOTEFT_GEN_A_CORE
Resting SpO2 on Room Air 87%.   Resting SpO2 on 2L 95%    Dx code Bilateral Pulmonary Nodules and Pulmonary Embolism    Goran Malave PAC [id number]

## 2021-04-19 NOTE — PROGRESS NOTE ADULT - ASSESSMENT
Pt is a 80W w/ PMHx of HTN, ANCA vasculitis, p/w with acute 9/10 stabbing, non-radiating chest pain with exertional shortness of breath. Not associated with n/v and improved when laying down, patient was seen in the urgent care and had EKG showing TWI in the lateral and anterior leads. Patient reported recent travel to [place] in car but no LE edema or hx blood clots.    B/l lung masses concerning for infection vs inflammation vs malignancy  CT imaging showing Bilateral masslike and nodular consolidations measuring up to 8.6 cm as described above. Differential diagnosis include infectious/inflammatory or neoplastic etiologies and mediastinal lymphadenopathy.  s/p VATS/lung bx 4/16  -fungal markers negative  -bacterial/AFB negative; pending fungal TCx    Acute PE  Chest Pain  CTA chest w/ Right lower and middle lobe pulmonary arterial emboli also w/ lung nodules  AC and additional management per primary team    ANCA Vasculitis  No hx of biologic use    Pt can make appt with me to follow up additional results by calling [phone]  Infectious Diseases will continue to follow. Please call with any questions.   Deonna Mclean M.D.  [facility], Division of Infectious Diseases [phone]  For over the weekend and after hours, please call [phone]

## 2021-04-19 NOTE — PROGRESS NOTE ADULT - SUBJECTIVE AND OBJECTIVE BOX
[facility], Division of Infectious Diseases  ANDREW Rodríguez Y. Patel, S. Shah  [phone]    Name: BETTIE PRATER  Age: 80y  Gender: Female  MRN: [id number]  Note Date: 04-19-21    Interval History:  Patient seen and examined at bedside this morning  No acute overnight events. Afebrile, remained afebrile through the weekend  Continues to remain on NC  S/p VATs on 4/16, s/p CT removal this wknd.  Notes reviewed    Antibiotics:      Medications:  acetaminophen   Tablet .. 650 milliGRAM(s) Oral every 6 hours  apixaban 10 milliGRAM(s) Oral two times a day  citalopram 10 milliGRAM(s) Oral daily  lidocaine   Patch 1 Patch Transdermal every 24 hours  ondansetron Injectable 4 milliGRAM(s) IV Push every 6 hours PRN  polyethylene glycol 3350 17 Gram(s) Oral daily  simvastatin 10 milliGRAM(s) Oral at bedtime  traMADol 50 milliGRAM(s) Oral every 6 hours PRN      Review of Systems:  A 10-point review of systems was obtained.     Pertinent positives and negatives--  Constitutional: No fevers. No Chills. No Rigors.   Cardiovascular: No chest pain. No palpitations.  Respiratory: No shortness of breath. No cough.  Gastrointestinal: No nausea or vomiting. No diarrhea or constipation.   Psychiatric: Pleasant. Appropriate affect.    Review of systems otherwise negative except as previously noted.    Allergies: codeine (Nausea)  penicillin (Hives)    For details regarding the patient's past medical history, social history, family history, and other miscellaneous elements, please refer the initial infectious diseases consultation and/or the admitting history and physical examination for this admission.    Objective:  Vitals:   T(C): 36.3 (04-19-21 @ 09:00), Max: 37.7 (04-19-21 @ 00:00)  HR: 94 (04-19-21 @ 09:13) (77 - 107)  BP: 142/49 (04-19-21 @ 09:00) (105/72 - 167/78)  RR: 24 (04-19-21 @ 09:13) (17 - 27)  SpO2: 91% (04-19-21 @ 09:13) (87% - 98%)    Physical Examination:  General: no acute distress  HEENT: NC/AT, EOMI, anicteric, no oral lesions  Neck: supple, no palpable LAD  Cardio: S1, S2 heard, RRR, no murmurs  Resp: decreased b/l breath sounds on NC  Abd: soft, NT, ND, + bowel sounds  Neuro: AAOx3, no obvious focal deficits  Ext: no edema or cyanosis  Skin: warm, dry, no visible rash      Laboratory Studies:  CBC:                       12.7   6.96  )-----------( 161      ( 19 Apr 2021 03:47 )             37.3     CMP: 04-19    136  |  102  |  17  ----------------------------<  107<H>  4.4   |  23  |  0.64    Ca    8.5      19 Apr 2021 03:47  Phos  2.0     04-19  Mg     1.8     04-19          Microbiology: reviewed    Culture - Acid Fast - Tissue w/Smear (collected 04-16-21 @ 21:17)  Source: .Tissue 1. LEFT UPER LOBE NODULE    Culture - Fungal, Tissue (collected 04-16-21 @ 21:17)  Source: .Tissue 1. LEFT UPER LOBE NODULE  Preliminary Report (04-19-21 @ 09:04):    Testing in progress    Culture - Tissue with Gram Stain (collected 04-16-21 @ 21:17)  Source: .Tissue 1. LEFT UPER LOBE NODULE  Gram Stain (04-16-21 @ 23:45):    No polymorphonuclear cells seen per low power field    No organisms seen per oil power field  Preliminary Report (04-17-21 @ 18:25):    No growth        Radiology: reviewed

## 2021-04-19 NOTE — PROGRESS NOTE ADULT - SUBJECTIVE AND OBJECTIVE BOX
Date of Service: 04-19-21 @ 11:17    Patient is a 80y old  Female who presents with a chief complaint of chest pain (19 Apr 2021 09:41)      Any change in ROS: on 3 L of oxygen: no SOB : sleepy:      MEDICATIONS  (STANDING):  acetaminophen   Tablet .. 650 milliGRAM(s) Oral every 6 hours  apixaban 10 milliGRAM(s) Oral two times a day  citalopram 10 milliGRAM(s) Oral daily  lidocaine   Patch 1 Patch Transdermal every 24 hours  polyethylene glycol 3350 17 Gram(s) Oral daily  simvastatin 10 milliGRAM(s) Oral at bedtime    MEDICATIONS  (PRN):  ondansetron Injectable 4 milliGRAM(s) IV Push every 6 hours PRN Nausea  traMADol 50 milliGRAM(s) Oral every 6 hours PRN Moderate Pain (4 - 6)    Vital Signs Last 24 Hrs  T(C): 36.3 (19 Apr 2021 09:00), Max: 37.7 (19 Apr 2021 00:00)  T(F): 97.4 (19 Apr 2021 09:00), Max: 99.8 (19 Apr 2021 00:00)  HR: 94 (19 Apr 2021 09:13) (77 - 107)  BP: 142/49 (19 Apr 2021 09:00) (113/45 - 167/78)  BP(mean): 70 (19 Apr 2021 09:00) (58 - 99)  RR: 24 (19 Apr 2021 09:13) (17 - 27)  SpO2: 91% (19 Apr 2021 09:13) (87% - 98%)    I&O's Summary    18 Apr 2021 07:01  -  19 Apr 2021 07:00  --------------------------------------------------------  IN: 45 mL / OUT: 495 mL / NET: -450 mL    19 Apr 2021 07:01  -  19 Apr 2021 11:17  --------------------------------------------------------  IN: 225 mL / OUT: 0 mL / NET: 225 mL          Physical Exam:   GENERAL: NAD, well-groomed, well-developed  HEENT: RANJIT/   Atraumatic, Normocephalic  ENMT: No tonsillar erythema, exudates, or enlargement; Moist mucous membranes, Good dentition, No lesions  NECK: Supple, No JVD, Normal thyroid  CHEST/LUNG: Clear to auscultaion  CVS: Regular rate and rhythm; No murmurs, rubs, or gallops  GI: : Soft, Nontender, Nondistended; Bowel sounds present  NERVOUS SYSTEM:  Alert & Oriented X3  EXTREMITIES:  2+ Peripheral Pulses, No clubbing, cyanosis, or edema  LYMPH: No lymphadenopathy noted  SKIN: No rashes or lesions  ENDOCRINOLOGY: No Thyromegaly  PSYCH: Appropriate    Labs:                              12.7   6.96  )-----------( 161      ( 19 Apr 2021 03:47 )             37.3                         12.3   6.47  )-----------( 137      ( 18 Apr 2021 03:41 )             36.8                         12.9   6.11  )-----------( 143      ( 17 Apr 2021 05:55 )             39.7                         13.5   5.85  )-----------( 160      ( 16 Apr 2021 06:29 )             40.6                         13.9   6.08  )-----------( 166      ( 15 Apr 2021 22:16 )             39.3     04-19    136  |  102  |  17  ----------------------------<  107<H>  4.4   |  23  |  0.64  04-18    130<L>  |  100  |  23  ----------------------------<  93  4.5   |  22  |  0.78  04-17    136  |  102  |  30<H>  ----------------------------<  84  4.5   |  19<L>  |  1.20  04-16    134<L>  |  100  |  15  ----------------------------<  107<H>  4.9   |  23  |  0.91    Ca    8.5      19 Apr 2021 03:47  Ca    8.1<L>      18 Apr 2021 03:41  Phos  2.0     04-19  Phos  1.9     04-18  Mg     1.8     04-19  Mg     2.0     04-18      CAPILLARY BLOOD GLUCOSE      < from: Xray Chest 1 View- PORTABLE-Routine (Xray Chest 1 View- PORTABLE-Routine in AM.) (04.19.21 @ 04:29) >    EXAM:  XR CHEST PORTABLE ROUTINE 1V        PROCEDURE DATE:  Apr 19 2021         INTERPRETATION:  TIME OF EXAM: April 19, 2021 at 4:29 AM    CLINICAL INFORMATION: Follow-up post thoracotomy.    TECHNIQUE:   Portable chest    INTERPRETATION:    Pleural-based peripheral mass seen in the left upper lobe unaltered from the study of the day before. Small amount of subcutaneous emphysema remains. Lungs are otherwise clear, the heart is not enlarged and there is no effusion or pneumothorax.      COMPARISON:  April 18      IMPRESSION:  Status post left thoracotomy with peripheral left upper lobe mass like opacity.              DANIEL NASSAR MD; Attending Radiologist  This document has been electronically signed. Apr 19 2021  7:02AM    < end of copied text >  < from: Xray Chest 1 View- PORTABLE-Urgent (Xray Chest 1 View- PORTABLE-Urgent .) (04.17.21 @ 11:35) >    EXAM:  XR CHEST PORTABLE ROUTINE 1V        PROCEDURE DATE:  Apr 17 2021         INTERPRETATION:  CLINICAL INDICATION: follow-up status post left VATS and chest tube removal    EXAM:  Frontal views of the chest from 4/17/2021 at 0553 and 1135. Compared to prior study from 4/16/2021.    Rotated right.    IMPRESSION:  Left pleural chest tube removed with questionable residual tiny medial left apical pneumothorax.    Residual small amount of lower lateral left chest wall subcutaneous emphysema also noted.    Redemonstrated ill-defined patchy masslike opacification in peripheral left midlung abutting lateral chest wall pleural surface. Previously seen medial right upper lung masslike density on CT from 4/13/2021 not currently well demonstrated radiographically.    Clear remaining visualized lungs. No pleural effusions.    Stable cardiac and mediastinal silhouettes.    Trachea midline.              CORINA OSULLIVAN MD; Attending Radiologist  This document has been electronically signed. Apr 17 2021  3:30PM    < end of copied text >        PT/INR - ( 19 Apr 2021 03:47 )   PT: 18.7 sec;   INR: 1.67 ratio         PTT - ( 19 Apr 2021 03:47 )  PTT:34.9 sec          RECENT CULTURES:  04-16 @ 21:17 .Tissue 1. LEFT UPER LOBE NODULE       No polymorphonuclear cells seen per low power field  No organisms seen per oil power field           No growth          RESPIRATORY CULTURES:          Studies  Chest X-RAY  CT SCAN Chest   Venous Dopplers: LE:   CT Abdomen  Others

## 2021-04-19 NOTE — PROGRESS NOTE ADULT - SUBJECTIVE AND OBJECTIVE BOX
SUBJECTIVE / OVERNIGHT EVENTS:  Feeling better today.  comfortable on NC. mild hypoxia on room air  stable xrays.   No complaints.  Chest pain free. no SOB, no N/V/D.  Denied HA/dizziness, abdominal pain.   await biopsy results and TTE       --------------------------------------------------------------------------------------------  LABS:                        12.7   6.96  )-----------( 161      ( 19 Apr 2021 03:47 )             37.3     04-19    136  |  102  |  17  ----------------------------<  107<H>  4.4   |  23  |  0.64    Ca    8.5      19 Apr 2021 03:47  Phos  2.0     04-19  Mg     1.8     04-19      PT/INR - ( 19 Apr 2021 03:47 )   PT: 18.7 sec;   INR: 1.67 ratio         PTT - ( 19 Apr 2021 03:47 )  PTT:34.9 sec  CAPILLARY BLOOD GLUCOSE                RADIOLOGY & ADDITIONAL TESTS:    Imaging Personally Reviewed:  [x] YES  [ ] NO    Consultant(s) Notes Reviewed:  [x] YES  [ ] NO    MEDICATIONS  (STANDING):  acetaminophen   Tablet .. 650 milliGRAM(s) Oral every 6 hours  apixaban 10 milliGRAM(s) Oral two times a day  citalopram 10 milliGRAM(s) Oral daily  lidocaine   Patch 1 Patch Transdermal every 24 hours  polyethylene glycol 3350 17 Gram(s) Oral daily  simvastatin 10 milliGRAM(s) Oral at bedtime    MEDICATIONS  (PRN):  ondansetron Injectable 4 milliGRAM(s) IV Push every 6 hours PRN Nausea  traMADol 50 milliGRAM(s) Oral every 6 hours PRN Moderate Pain (4 - 6)      Care Discussed with Consultants/Other Providers [x] YES  [ ] NO    Vital Signs Last 24 Hrs  T(C): 36.3 (19 Apr 2021 09:00), Max: 37.7 (19 Apr 2021 00:00)  T(F): 97.4 (19 Apr 2021 09:00), Max: 99.8 (19 Apr 2021 00:00)  HR: 94 (19 Apr 2021 09:13) (77 - 107)  BP: 142/49 (19 Apr 2021 09:00) (113/45 - 167/78)  BP(mean): 70 (19 Apr 2021 09:00) (58 - 99)  RR: 24 (19 Apr 2021 09:13) (17 - 27)  SpO2: 91% (19 Apr 2021 09:13) (87% - 98%)  I&O's Summary    18 Apr 2021 07:01  -  19 Apr 2021 07:00  --------------------------------------------------------  IN: 45 mL / OUT: 495 mL / NET: -450 mL    19 Apr 2021 07:01  -  19 Apr 2021 12:32  --------------------------------------------------------  IN: 225 mL / OUT: 0 mL / NET: 225 mL      PHYSICAL EXAM:  GENERAL: NAD, well-developed, comfortable, on NC  HEAD:  Atraumatic, Normocephalic  EYES: EOMI, PERRLA, conjunctiva and sclera clear  NECK: Supple, No JVD  CHEST/LUNG: mild decrease breath sounds bilaterally; No wheeze   HEART: Regular rate and rhythm; No murmurs, rubs, or gallops  ABDOMEN: Soft, Nontender, Nondistended; Bowel sounds present  Neuro: AAOx3, no focal weakness, 5/5 b/l extremity strength  EXTREMITIES:  2+ Peripheral Pulses, No clubbing, cyanosis, or edema  SKIN: No rashes or lesions

## 2021-04-19 NOTE — DIETITIAN INITIAL EVALUATION ADULT. - ORAL INTAKE PTA/DIET HISTORY
Met w/pt who provided nutrition hx.  Pt denies food allergies, difficulty chewing/swallowing, modified diet PTA or wt change.  Pt reports her appetite is usually good but since surgery it has decreased.  She states she is eating fruit.  She has been filling out her menu and receiving what she orders, but at this time has no desire to eat.

## 2021-04-20 LAB
ANION GAP SERPL CALC-SCNC: 7 MMOL/L — SIGNIFICANT CHANGE UP (ref 7–14)
AUTO DIFF PNL BLD: ABNORMAL
BUN SERPL-MCNC: 14 MG/DL — SIGNIFICANT CHANGE UP (ref 7–23)
C-ANCA SER-ACNC: NEGATIVE — SIGNIFICANT CHANGE UP
CALCIUM SERPL-MCNC: 8.4 MG/DL — SIGNIFICANT CHANGE UP (ref 8.4–10.5)
CHLORIDE SERPL-SCNC: 103 MMOL/L — SIGNIFICANT CHANGE UP (ref 98–107)
CO2 SERPL-SCNC: 25 MMOL/L — SIGNIFICANT CHANGE UP (ref 22–31)
CREAT SERPL-MCNC: 0.7 MG/DL — SIGNIFICANT CHANGE UP (ref 0.5–1.3)
GAMMA INTERFERON BACKGROUND BLD IA-ACNC: 0.45 IU/ML — SIGNIFICANT CHANGE UP
GLUCOSE SERPL-MCNC: 99 MG/DL — SIGNIFICANT CHANGE UP (ref 70–99)
HCT VFR BLD CALC: 37.7 % — SIGNIFICANT CHANGE UP (ref 34.5–45)
HGB BLD-MCNC: 12.4 G/DL — SIGNIFICANT CHANGE UP (ref 11.5–15.5)
M TB IFN-G BLD-IMP: NEGATIVE — SIGNIFICANT CHANGE UP
M TB IFN-G CD4+ BCKGRND COR BLD-ACNC: -0.01 IU/ML — SIGNIFICANT CHANGE UP
M TB IFN-G CD4+CD8+ BCKGRND COR BLD-ACNC: -0.02 IU/ML — SIGNIFICANT CHANGE UP
MAGNESIUM SERPL-MCNC: 1.9 MG/DL — SIGNIFICANT CHANGE UP (ref 1.6–2.6)
MCHC RBC-ENTMCNC: 32.9 GM/DL — SIGNIFICANT CHANGE UP (ref 32–36)
MCHC RBC-ENTMCNC: 34.3 PG — HIGH (ref 27–34)
MCV RBC AUTO: 104.1 FL — HIGH (ref 80–100)
NRBC # BLD: 0 /100 WBCS — SIGNIFICANT CHANGE UP
NRBC # FLD: 0 K/UL — SIGNIFICANT CHANGE UP
P-ANCA SER-ACNC: NEGATIVE — SIGNIFICANT CHANGE UP
PHOSPHATE SERPL-MCNC: 2.2 MG/DL — LOW (ref 2.5–4.5)
PLATELET # BLD AUTO: 171 K/UL — SIGNIFICANT CHANGE UP (ref 150–400)
POTASSIUM SERPL-MCNC: 4.5 MMOL/L — SIGNIFICANT CHANGE UP (ref 3.5–5.3)
POTASSIUM SERPL-SCNC: 4.5 MMOL/L — SIGNIFICANT CHANGE UP (ref 3.5–5.3)
QUANT TB PLUS MITOGEN MINUS NIL: 7.68 IU/ML — SIGNIFICANT CHANGE UP
RBC # BLD: 3.62 M/UL — LOW (ref 3.8–5.2)
RBC # FLD: 15 % — HIGH (ref 10.3–14.5)
SODIUM SERPL-SCNC: 135 MMOL/L — SIGNIFICANT CHANGE UP (ref 135–145)
WBC # BLD: 6.17 K/UL — SIGNIFICANT CHANGE UP (ref 3.8–10.5)
WBC # FLD AUTO: 6.17 K/UL — SIGNIFICANT CHANGE UP (ref 3.8–10.5)

## 2021-04-20 PROCEDURE — 99233 SBSQ HOSP IP/OBS HIGH 50: CPT

## 2021-04-20 RX ADMIN — CITALOPRAM 10 MILLIGRAM(S): 10 TABLET, FILM COATED ORAL at 11:43

## 2021-04-20 RX ADMIN — LIDOCAINE 1 PATCH: 4 CREAM TOPICAL at 11:43

## 2021-04-20 RX ADMIN — Medication 650 MILLIGRAM(S): at 18:25

## 2021-04-20 RX ADMIN — LIDOCAINE 1 PATCH: 4 CREAM TOPICAL at 21:28

## 2021-04-20 RX ADMIN — Medication 650 MILLIGRAM(S): at 14:54

## 2021-04-20 RX ADMIN — Medication 650 MILLIGRAM(S): at 06:00

## 2021-04-20 RX ADMIN — APIXABAN 10 MILLIGRAM(S): 2.5 TABLET, FILM COATED ORAL at 06:00

## 2021-04-20 RX ADMIN — Medication 650 MILLIGRAM(S): at 11:43

## 2021-04-20 RX ADMIN — SIMVASTATIN 10 MILLIGRAM(S): 20 TABLET, FILM COATED ORAL at 21:28

## 2021-04-20 RX ADMIN — LIDOCAINE 1 PATCH: 4 CREAM TOPICAL at 23:30

## 2021-04-20 RX ADMIN — APIXABAN 10 MILLIGRAM(S): 2.5 TABLET, FILM COATED ORAL at 18:26

## 2021-04-20 NOTE — PROGRESS NOTE ADULT - SUBJECTIVE AND OBJECTIVE BOX
Date of Service: 04-20-21 @ 13:57    Patient is a 80y old  Female who presents with a chief complaint of chest pain (20 Apr 2021 13:24)      Any change in ROS: Doing ok : no SOB :       MEDICATIONS  (STANDING):  acetaminophen   Tablet .. 650 milliGRAM(s) Oral every 6 hours  apixaban 10 milliGRAM(s) Oral two times a day  citalopram 10 milliGRAM(s) Oral daily  lidocaine   Patch 1 Patch Transdermal every 24 hours  polyethylene glycol 3350 17 Gram(s) Oral daily  simvastatin 10 milliGRAM(s) Oral at bedtime    MEDICATIONS  (PRN):  ondansetron Injectable 4 milliGRAM(s) IV Push every 6 hours PRN Nausea  traMADol 50 milliGRAM(s) Oral every 6 hours PRN Moderate Pain (4 - 6)    Vital Signs Last 24 Hrs  T(C): 36.9 (20 Apr 2021 12:00), Max: 37.2 (20 Apr 2021 04:00)  T(F): 98.4 (20 Apr 2021 12:00), Max: 99 (20 Apr 2021 04:00)  HR: 92 (20 Apr 2021 12:00) (77 - 100)  BP: 142/69 (20 Apr 2021 12:00) (106/73 - 166/62)  BP(mean): 87 (20 Apr 2021 12:00) (67 - 96)  RR: 16 (20 Apr 2021 12:00) (16 - 26)  SpO2: 96% (20 Apr 2021 12:00) (88% - 98%)    I&O's Summary    19 Apr 2021 07:01  -  20 Apr 2021 07:00  --------------------------------------------------------  IN: 925 mL / OUT: 400 mL / NET: 525 mL    20 Apr 2021 07:01  -  20 Apr 2021 13:57  --------------------------------------------------------  IN: 450 mL / OUT: 400 mL / NET: 50 mL          Physical Exam:   GENERAL: NAD, well-groomed, well-developed  HEENT: RANJIT/   Atraumatic, Normocephalic  ENMT: No tonsillar erythema, exudates, or enlargement; Moist mucous membranes, Good dentition, No lesions  NECK: Supple, No JVD, Normal thyroid  CHEST/LUNG: Clear to auscultaion  CVS: Regular rate and rhythm; No murmurs, rubs, or gallops  GI: : Soft, Nontender, Nondistended; Bowel sounds present  NERVOUS SYSTEM:  Alert & Oriented X3  EXTREMITIES: -edema  LYMPH: No lymphadenopathy noted  SKIN: No rashes or lesions  ENDOCRINOLOGY: No Thyromegaly  PSYCH: Appropriate    Labs:                              12.4   6.17  )-----------( 171      ( 20 Apr 2021 06:15 )             37.7                         12.7   6.96  )-----------( 161      ( 19 Apr 2021 03:47 )             37.3                         12.3   6.47  )-----------( 137      ( 18 Apr 2021 03:41 )             36.8                         12.9   6.11  )-----------( 143      ( 17 Apr 2021 05:55 )             39.7     04-20    135  |  103  |  14  ----------------------------<  99  4.5   |  25  |  0.70  04-19    136  |  102  |  17  ----------------------------<  107<H>  4.4   |  23  |  0.64  04-18    130<L>  |  100  |  23  ----------------------------<  93  4.5   |  22  |  0.78  04-17    136  |  102  |  30<H>  ----------------------------<  84  4.5   |  19<L>  |  1.20    Ca    8.4      20 Apr 2021 06:15  Ca    8.5      19 Apr 2021 03:47  Phos  2.2     04-20  Phos  2.0     04-19  Mg     1.9     04-20  Mg     1.8     04-19      CAPILLARY BLOOD GLUCOSE            PT/INR - ( 19 Apr 2021 03:47 )   PT: 18.7 sec;   INR: 1.67 ratio         PTT - ( 19 Apr 2021 03:47 )  PTT:34.9 sec    rad< from: Xray Chest 1 View- PORTABLE-Routine (Xray Chest 1 View- PORTABLE-Routine in AM.) (04.19.21 @ 04:29) >    EXAM:  XR CHEST PORTABLE ROUTINE 1V        PROCEDURE DATE:  Apr 19 2021         INTERPRETATION:  TIME OF EXAM: April 19, 2021 at 4:29 AM    CLINICAL INFORMATION: Follow-up post thoracotomy.    TECHNIQUE:   Portable chest    INTERPRETATION:    Pleural-based peripheral mass seen in the left upper lobe unaltered from the study of the day before. Small amount of subcutaneous emphysema remains. Lungs are otherwise clear, the heart is not enlarged and there is no effusion or pneumothorax.      COMPARISON:  April 18      IMPRESSION:  Status post left thoracotomy with peripheral left upper lobe mass like opacity.              DANIEL NASSAR MD; Attending Radiologist  This document has been electronically signed. Apr 19 2021  7:02AM    < end of copied text >        RECENT CULTURES:  04-16 @ 21:17 .Tissue 1. LEFT UPER LOBE NODULE       No polymorphonuclear cells seen per low power field  No organisms seen per oil power field           No growth          RESPIRATORY CULTURES:          Studies  Chest X-RAY  CT SCAN Chest   Venous Dopplers: LE:   CT Abdomen  Others

## 2021-04-20 NOTE — PROGRESS NOTE ADULT - ASSESSMENT
Pt is a 80W w/ PMHx of HTN, ANCA vasculitis, p/w with acute 9/10 stabbing, non-radiating chest pain with exertional shortness of breath. Not associated with n/v and improved when laying down, patient was seen in the urgent care and had EKG showing TWI in the lateral and anterior leads. Patient reported recent travel to [place] in car but no LE edema or hx blood clots.    B/l lung masses concerning for infection vs inflammation vs malignancy  CT imaging showing Bilateral masslike and nodular consolidations measuring up to 8.6 cm as described above. Differential diagnosis include infectious/inflammatory or neoplastic etiologies and mediastinal lymphadenopathy.  s/p VATS/lung bx 4/16  -fungal markers negative  -bacterial/AFB negative; pending fungal TCx  -pending path    Acute PE  Chest Pain  CTA chest w/ Right lower and middle lobe pulmonary arterial emboli also w/ lung nodules  AC and additional management per primary team    ANCA Vasculitis  No hx of biologic use    Pt can make appt with me to follow up additional results by calling [phone] if being planned for d/c.  Infectious Diseases will continue to follow. Please call with any questions.   Deonna Mclean M.D.  [facility], Division of Infectious Diseases [phone]  For over the weekend and after hours, please call [phone]

## 2021-04-20 NOTE — PROGRESS NOTE ADULT - ASSESSMENT
80 year old female with PMHx of HTN, ANCA vasculitis (dx 20 years ago, been on Azathioprine since then, being followed by Rheum outpt), nonsmoker, presents with acute 9/10 stabbing, non-radiating chest pain lasting 10-15 mins. with exertional shortness of breath. EKG showing TWI in the lateral and anterior leads. Patient reported recent travel to [place] in car (~10 hrs ride).     # Acute pulmonary embolus, first episode:  Prolonged car ride and recent sedentary life style during pandemic. no family hx.  CTa chest reviewed. large pulm masses b/l noted: infectious vs. inflammatory. vs. malignancy induced   Pulm Consulted. s/p biopsy/VATs 4/16/21. prelimp likely malignancy, but will wait for final results  LE dopplers: +DVT.   TTE pending.   CT-ICU care appreciated. chest tube removed.   Hep gtt switched to Eliquis. Tolerating.  Check spO2 on room air and on exertion to see if the pt needs home O2.     # hx ANCA vasculitis:   Chronic, stable, hold home medication regime Azathioprine 150MG daily while waiting for biopsy results.  can likely resume if no infection. rheum eval appreciated.     # upper pole of right kidney: complex cyst? outpt MRI. also biopsy of lung mass as above.    # HTN: monitor BP    Physical therapy. Out of bed to chair with assistance. monitor O2 status.   # DVT ppx: On Hep gtt --> Eliquis  d/w the daughter Leeann and pt, all info can go to her (the daughter), she will inform the rest of the family members.     - Dr. MARY Arias ([facility])  - [phone]  80 year old female with PMHx of HTN, ANCA vasculitis (dx 20 years ago, been on Azathioprine since then, being followed by Rheum outpt), nonsmoker, presents with acute 9/10 stabbing, non-radiating chest pain lasting 10-15 mins. with exertional shortness of breath. EKG showing TWI in the lateral and anterior leads. Patient reported recent travel to [place] in car (~10 hrs ride).     # Acute pulmonary embolus, first episode:  Prolonged car ride and recent sedentary life style during pandemic. no family hx.  CTa chest reviewed. large pulm masses b/l noted: infectious vs. inflammatory. vs. malignancy induced   Pulm Consulted. s/p biopsy/VATs 4/16/21. prelimp likely malignancy, but will wait for final results  LE dopplers: +DVT.   TTE pending.   CT-ICU care appreciated. chest tube removed.   Hep gtt switched to Eliquis. Tolerating.  Check spO2 on room air and on exertion to see if the pt needs home O2.     # hx ANCA vasculitis:   Chronic, stable, hold home medication regime Azathioprine 150MG daily while waiting for biopsy results.  can likely resume if no infection. rheum eval appreciated.     # upper pole of right kidney: complex cyst? outpt MRI. also biopsy of lung mass as above.    # HTN: monitor BP    Physical therapy. Out of bed to chair with assistance. monitor O2 status.   # DVT ppx: On Hep gtt --> Eliquis  d/w the daughter Leeann and pt, all info can go to her (the daughter), she will inform the rest of the family members.   Pt prefers to go home, as oppose to rehab.   no objection to d/c planning per the primary team.     d/w CT surgery.     - [facility]  - [phone]

## 2021-04-20 NOTE — PROGRESS NOTE ADULT - SUBJECTIVE AND OBJECTIVE BOX
PROCEDURE: Left VATS, Left upper lobe wedge resection 16-Apr-2021       ISSUES:   Acute PE (RML RLL Pulm arteries)  Lung mass  MASON  Post op pain  Bilateral DVT (Peroneal and Tibia)  ANCA Vasculitis  HTN      INTERVAL EVENTS:   On 2L O2. Awaiting home O2.  On apixaban for PE.    HISTORY:   Patient reports moderate pain at chest wall incision sites which is worse with coughing and deep breathing without associated fever or dyspnea. Pain is improved with use of pain meds.     PHYSICAL EXAM:   Gen: Comfortable, No acute distress  Eyes: Sclera white, Conjunctiva normal, Eyelids normal, Pupils symmetrical   ENT: Mucous membranes moist,  ,  ,    Neck: Trachea midline,  ,  ,  ,  ,    CV: Rate regular, Rhythm regular,  ,  ,    Resp: Breath sounds clear, No accessory muscles use, L chest tube in place,    Abd: Soft, Non-distended, Non-tender, Bowel sounds normal,  ,  ,    Skin: Warm, No peripheral edema of lower extremities,  ,    : No lopez  Neuro: Moving all 4 extremities,    Psych: A&Ox3      ASSESSMENT AND PLAN:     NEURO:  Post-operative Pain - Pain control with Tylenol PRN.        RESPIRATORY:  Hypoxia - Home O2. Wean nasal cannula for goal O2sat above 92. Obtain CXR. Incentive spirometry. Chest PT and frequent suctioning. Continue bronchodilators. OOB to chair & ambulate w/ assistance. Continuous pulse oximetry for support & to prevent decompensation.           Acute Pulmonary Embolism and Bilateral peroneal DVT - stable. Apixaban.         CARDIOVASCULAR:  Hemodynamically stable - Not on pressors. Continue hemodynamic monitoring.  HTN - stable. Hold home antihypertensives for now.        ANCA vasculitis - Stable. Stop azathioprine due to risk of accelerated cancer.             RENAL:  MASON - Resolved. Monitor IOs and electrolytes. Keep K above 4.0 and Mg above 2.0.     R renal infarct: CT showing possible R renal infarct, No complaints of R flank pain or urinary symptoms, Renal US IMPRESSION: Hypoechoic lesion in the upper pole of right kidney which corresponds to the cystic lesion identified on recent CT. This may represent a complex cyst or a solid renal mass and should be further characterized with a contrast-enhanced MRI on outpatient basis.      GASTROINTESTINAL:  GI prophylaxis not indicated  Zofran and Reglan IV PRN for nausea  Regular consistency diet            HEMATOLOGIC:  No signs of active bleeding. Monitor Hgb in CBC in AM  On therapeutic anticoagulation.         INFECTIOUS DISEASE:  All surgical sites appear clean. No signs of active infection. Will monitor for fever and leukocytosis.          ENDOCRINE:  Stable – Monitor glucose fingersticks for goal 120-180.            Pertinent clinical, laboratory, radiographic, hemodynamic, echocardiographic, respiratory data, microbiologic data and chart were reviewed by myself and analyzed frequently throughout the course of the day and night by myself.    Plan discussed at length with the CTICU staff and Attending CT Surgeon.     Patient's status was discussed with patient at bedside.    _________________________  VITAL SIGNS:  Vital Signs Last 24 Hrs  T(C): 36.9 (20 Apr 2021 12:00), Max: 37.2 (20 Apr 2021 04:00)  T(F): 98.4 (20 Apr 2021 12:00), Max: 99 (20 Apr 2021 04:00)  HR: 92 (20 Apr 2021 12:00) (77 - 100)  BP: 142/69 (20 Apr 2021 12:00) (106/73 - 166/62)  BP(mean): 87 (20 Apr 2021 12:00) (69 - 96)  RR: 16 (20 Apr 2021 12:00) (16 - 26)  SpO2: 96% (20 Apr 2021 12:00) (88% - 98%)  I/Os:   I&O's Detail    19 Apr 2021 07:01  -  20 Apr 2021 07:00  --------------------------------------------------------  IN:    Oral Fluid: 925 mL  Total IN: 925 mL    OUT:    Voided (mL): 400 mL  Total OUT: 400 mL    Total NET: 525 mL      20 Apr 2021 07:01  -  20 Apr 2021 15:36  --------------------------------------------------------  IN:    Oral Fluid: 450 mL  Total IN: 450 mL    OUT:    Voided (mL): 400 mL  Total OUT: 400 mL    Total NET: 50 mL              MEDICATIONS:  MEDICATIONS  (STANDING):  acetaminophen   Tablet .. 650 milliGRAM(s) Oral every 6 hours  apixaban 10 milliGRAM(s) Oral two times a day  citalopram 10 milliGRAM(s) Oral daily  lidocaine   Patch 1 Patch Transdermal every 24 hours  polyethylene glycol 3350 17 Gram(s) Oral daily  simvastatin 10 milliGRAM(s) Oral at bedtime    MEDICATIONS  (PRN):  ondansetron Injectable 4 milliGRAM(s) IV Push every 6 hours PRN Nausea  traMADol 50 milliGRAM(s) Oral every 6 hours PRN Moderate Pain (4 - 6)      LABS:                        12.4   6.17  )-----------( 171      ( 20 Apr 2021 06:15 )             37.7     04-20    135  |  103  |  14  ----------------------------<  99  4.5   |  25  |  0.70    Ca    8.4      20 Apr 2021 06:15  Phos  2.2     04-20  Mg     1.9     04-20        PT/INR - ( 19 Apr 2021 03:47 )   PT: 18.7 sec;   INR: 1.67 ratio         PTT - ( 19 Apr 2021 03:47 )  PTT:34.9 sec      _________________________

## 2021-04-20 NOTE — PROGRESS NOTE ADULT - SUBJECTIVE AND OBJECTIVE BOX
[facility], Division of Infectious Diseases  ANDREW Rodríguez Y. Patel, S. Shah  [phone]    Name: BETTIE PRATER  Age: 80y  Gender: Female  MRN: [id number]  Note Date: 04-20-21    Interval History:  Patient seen and examined at bedside this morning  No acute overnight events. Afebrile  Continues to remain on NC  Notes reviewed    Antibiotics:      Medications:  acetaminophen   Tablet .. 650 milliGRAM(s) Oral every 6 hours  apixaban 10 milliGRAM(s) Oral two times a day  citalopram 10 milliGRAM(s) Oral daily  lidocaine   Patch 1 Patch Transdermal every 24 hours  ondansetron Injectable 4 milliGRAM(s) IV Push every 6 hours PRN  polyethylene glycol 3350 17 Gram(s) Oral daily  simvastatin 10 milliGRAM(s) Oral at bedtime  traMADol 50 milliGRAM(s) Oral every 6 hours PRN      Review of Systems:  A 10-point review of systems was obtained.     Pertinent positives and negatives--  Constitutional: No fevers. No Chills. No Rigors.   Cardiovascular: No chest pain. No palpitations.  Respiratory: No shortness of breath. No cough.  Gastrointestinal: No nausea or vomiting. No diarrhea or constipation.   Psychiatric: Pleasant. Appropriate affect.    Review of systems otherwise negative except as previously noted.    Allergies: codeine (Nausea)  penicillin (Hives)    For details regarding the patient's past medical history, social history, family history, and other miscellaneous elements, please refer the initial infectious diseases consultation and/or the admitting history and physical examination for this admission.    Objective:  Vitals:   T(C): 37.2 (04-20-21 @ 04:00), Max: 37.2 (04-20-21 @ 04:00)  HR: 100 (04-20-21 @ 07:00) (77 - 100)  BP: 152/65 (04-20-21 @ 07:00) (125/70 - 166/62)  RR: 22 (04-20-21 @ 07:00) (18 - 26)  SpO2: 96% (04-20-21 @ 07:00) (87% - 97%)    Physical Examination:  General: no acute distress  HEENT: NC/AT, EOMI, anicteric, no oral lesions  Neck: supple, no palpable LAD  Cardio: S1, S2 heard, RRR, no murmurs  Resp: decreased b/l breath sounds on NC  Abd: soft, NT, ND, + bowel sounds  Ext: no edema or cyanosis  Skin: warm, dry, no visible rash      Laboratory Studies:  CBC:                       12.4   6.17  )-----------( 171      ( 20 Apr 2021 06:15 )             37.7     CMP: 04-20    135  |  103  |  14  ----------------------------<  99  4.5   |  25  |  0.70    Ca    8.4      20 Apr 2021 06:15  Phos  2.2     04-20  Mg     1.9     04-20          Microbiology: reviewed    Culture - Acid Fast - Tissue w/Smear (collected 04-16-21 @ 21:17)  Source: .Tissue 1. LEFT UPER LOBE NODULE    Culture - Fungal, Tissue (collected 04-16-21 @ 21:17)  Source: .Tissue 1. LEFT UPER LOBE NODULE  Preliminary Report (04-19-21 @ 09:04):    Testing in progress    Culture - Tissue with Gram Stain (collected 04-16-21 @ 21:17)  Source: .Tissue 1. LEFT UPER LOBE NODULE  Gram Stain (04-16-21 @ 23:45):    No polymorphonuclear cells seen per low power field    No organisms seen per oil power field  Preliminary Report (04-17-21 @ 18:25):    No growth        Radiology: reviewed

## 2021-04-20 NOTE — CHART NOTE - NSCHARTNOTEFT_GEN_A_CORE
Patients [id number] is 87% at rest on room-air  Hemodynamically stable and comfortable with 2L nasal cannula   Requires Home oxygen due to recent pulmonary embolism and lung cancer

## 2021-04-20 NOTE — PROGRESS NOTE ADULT - ASSESSMENT
Patient is a 80 year old female with PMHx of HTN, ANCA vasculitis, non smoker, presents with acute 9/10 stabbing, non-radiating chest pain with exertional shortness of breath. Not associated with n/v and improved when laying down, patient was seen in the urgent care and had EKG showing TWI in the lateral and anterior leads. Patient reported recent travel to [place] in car but no LE edema or hx blood clots.    PE:  Lung masses  R renal infarct: CT showing possible R renal infarct, No complaints of R flank pain or urinary symptoms, Renal US IMPRESSION: Hypoechoic lesion in the upper pole of right kidney which corresponds to the cystic lesion identified on recent CT. This may represent a complex cyst or a solid renal mass and should be further characterized with a contrast-enhanced MRI on outpatient basis.  HTN:  ANCA Vasculitis:    4/14/2021:      PE: on heparin: get dopplers are postive and echo is still pending? noac ?  ANCA Vasculitis: on azathioprine: 150 mg per day : LFTs are normal   Lung masses: DW ROLAND: She has large masses on both sides and small nodule: RUL and LLL: may be exacerbation of vasculitis ?: core biopsy vs vats biopsy : get RHEUM involved and ID involved: ? fungal infection can happen: id consult: she does not look sick   No enlarged axillary lymph nodes. A few enlarged mediastinal lymph nodes with the largest in a precarinal location measuring about 1.3 cm.: dw roladn: small : follow up   R renal infarct: CT showing possible R renal infarct, No complaints of R flank pain or urinary symptoms, Renal US IMPRESSION: Hypoechoic lesion in the upper pole of right kidney which corresponds to the cystic lesion identified on recent CT. This may represent a complex cyst or a solid renal mass and should be further characterized with a contrast-enhanced MRI on outpatient basis.  HTN: controlled  DW PMD     4/16:    PE: on heparin: for vats biopsy today   ANCA Vasculitis: on azathioprine: 150 mg per day : LFTs are normal  rheum consult pending  Lung masses: DW ROLAND: She has large masses on both sides and small nodule: RUL and LLL: may be exacerbation of vasculitis ?: core biopsy vs vats biopsy : get RHEUM involved and ID involved: ? fungal infection can happen: id consult: she does not look sick   No enlarged axillary lymph nodes. A few enlarged mediastinal lymph nodes with the largest in a precarinal location measuring about 1.3 cm.: dw rake: small : follow up   R renal infarct: CT showing possible R renal infarct, No complaints of R flank pain or urinary symptoms, Renal US IMPRESSION: Hypoechoic lesion in the upper pole of right kidney which corresponds to the cystic lesion identified on recent CT. This may represent a complex cyst or a solid renal mass and should be further characterized with a contrast-enhanced MRI on outpatient basis.  HTN: controlled   PMD     4/18:      PE/dvt: on heparin: s/p vats biopsy : await path   ANCA Vasculitis: on azathioprine: 150 mg per day : LFTs are normal  rheum consult pending  Lung masses: DW RAKE: She has large masses on both sides and small nodule: RUL and LLL: may be exacerbation of vasculitis : biopsy done: await pathology   No enlarged axillary lymph nodes. A few enlarged mediastinal lymph nodes with the largest in a precarinal location measuring about 1.3 cm.: dw rake: small : follow up   R renal infarct: CT showing possible R renal infarct, No complaints of R flank pain or urinary symptoms, Renal US IMPRESSION: Hypoechoic lesion in the upper pole of right kidney which corresponds to the cystic lesion identified on recent CT. This may represent a complex cyst or a solid renal mass and should be further characterized with a contrast-enhanced MRI on outpatient basis.    HTN: controlled   PMD     4/19:      PE/dvt: on Eliquis: s/p vats biopsy : await path : no AFB   ANCA Vasculitis: on azathioprine: 150 mg per day : LFTs are normal  rheum follow p   Lung masses: DW RAKE: She has large masses on both sides and small nodule: RUL and LLL: may be exacerbation of vasculitis : biopsy done: await pathology   No enlarged axillary lymph nodes. A few enlarged mediastinal lymph nodes with the largest in a precarinal location measuring about 1.3 cm.: dw rake: small : follow up   R renal infarct: CT showing possible R renal infarct, No complaints of R flank pain or urinary symptoms, Renal US IMPRESSION: Hypoechoic lesion in the upper pole of right kidney which corresponds to the cystic lesion identified on recent CT. This may represent a complex cyst or a solid renal mass and should be further characterized with a contrast-enhanced MRI on outpatient basis.    HTN: controlled   team    4/20:      PE/dvt: on Eliquis: s/p vats biopsy : await path : no AFB   ANCA Vasculitis: on azathioprine: 150 mg per day : LFTs are normal  rheum follow p   Lung masses: LEIGHA ROLAND: She has large masses on both sides and small nodule: RUL and LLL: may be exacerbation of vasculitis : biopsy done: await pathology   No enlarged axillary lymph nodes. A few enlarged mediastinal lymph nodes with the largest in a precarinal location measuring about 1.3 cm.: leigha roland: small : follow up   R renal infarct: CT showing possible R renal infarct, No complaints of R flank pain or urinary symptoms, Renal US IMPRESSION: Hypoechoic lesion in the upper pole of right kidney which corresponds to the cystic lesion identified on recent CT. This may represent a complex cyst or a solid renal mass and should be further characterized with a contrast-enhanced MRI on outpatient basis.  HTN: controlled  awaiting dc ? taper oxygen off  DW team

## 2021-04-20 NOTE — PROGRESS NOTE ADULT - SUBJECTIVE AND OBJECTIVE BOX
SUBJECTIVE / OVERNIGHT EVENTS:  feels well, comfortable on NC  no cp, no sob, no n/v/d. no abdominal pain.  no headache, no dizziness.   wants to go home instead of rehab.    --------------------------------------------------------------------------------------------  LABS:                        12.4   6.17  )-----------( 171      ( 20 Apr 2021 06:15 )             37.7     04-20    135  |  103  |  14  ----------------------------<  99  4.5   |  25  |  0.70    Ca    8.4      20 Apr 2021 06:15  Phos  2.2     04-20  Mg     1.9     04-20      PT/INR - ( 19 Apr 2021 03:47 )   PT: 18.7 sec;   INR: 1.67 ratio         PTT - ( 19 Apr 2021 03:47 )  PTT:34.9 sec  CAPILLARY BLOOD GLUCOSE        RADIOLOGY & ADDITIONAL TESTS:    Imaging Personally Reviewed:  [x] YES  [ ] NO    Consultant(s) Notes Reviewed:  [x] YES  [ ] NO    MEDICATIONS  (STANDING):  acetaminophen   Tablet .. 650 milliGRAM(s) Oral every 6 hours  apixaban 10 milliGRAM(s) Oral two times a day  citalopram 10 milliGRAM(s) Oral daily  lidocaine   Patch 1 Patch Transdermal every 24 hours  polyethylene glycol 3350 17 Gram(s) Oral daily  simvastatin 10 milliGRAM(s) Oral at bedtime    MEDICATIONS  (PRN):  ondansetron Injectable 4 milliGRAM(s) IV Push every 6 hours PRN Nausea  traMADol 50 milliGRAM(s) Oral every 6 hours PRN Moderate Pain (4 - 6)      Care Discussed with Consultants/Other Providers [x] YES  [ ] NO    Vital Signs Last 24 Hrs  T(C): 36.8 (20 Apr 2021 08:00), Max: 37.2 (20 Apr 2021 04:00)  T(F): 98.2 (20 Apr 2021 08:00), Max: 99 (20 Apr 2021 04:00)  HR: 85 (20 Apr 2021 11:00) (77 - 100)  BP: 126/55 (20 Apr 2021 09:00) (106/73 - 166/62)  BP(mean): 74 (20 Apr 2021 09:00) (67 - 96)  RR: 19 (20 Apr 2021 11:00) (18 - 26)  SpO2: 98% (20 Apr 2021 11:00) (88% - 98%)  I&O's Summary    19 Apr 2021 07:01  -  20 Apr 2021 07:00  --------------------------------------------------------  IN: 925 mL / OUT: 400 mL / NET: 525 mL    20 Apr 2021 07:01  -  20 Apr 2021 12:44  --------------------------------------------------------  IN: 450 mL / OUT: 400 mL / NET: 50 mL      PHYSICAL EXAM:  GENERAL: NAD, well-developed, comfortable, on NC  HEAD:  Atraumatic, Normocephalic  EYES: EOMI, PERRLA, conjunctiva and sclera clear  NECK: Supple, No JVD  CHEST/LUNG: mild decrease breath sounds bilaterally; No wheeze   HEART: Regular rate and rhythm; No murmurs, rubs, or gallops  ABDOMEN: Soft, Nontender, Nondistended; Bowel sounds present  Neuro: AAOx3, no focal weakness, 5/5 b/l extremity strength  EXTREMITIES:  2+ Peripheral Pulses, No clubbing, cyanosis, or edema  SKIN: No rashes or lesions

## 2021-04-20 NOTE — PROGRESS NOTE ADULT - SUBJECTIVE AND OBJECTIVE BOX
CC: no new complaints    TELEMETRY:     PHYSICAL EXAM:    T(C): 36.9 (04-20-21 @ 12:00), Max: 37.2 (04-20-21 @ 04:00)  HR: 92 (04-20-21 @ 12:00) (77 - 100)  BP: 142/69 (04-20-21 @ 12:00) (106/73 - 166/62)  RR: 16 (04-20-21 @ 12:00) (16 - 26)  SpO2: 96% (04-20-21 @ 12:00) (88% - 98%)  Wt(kg): --  I&O's Summary    19 Apr 2021 07:01  -  20 Apr 2021 07:00  --------------------------------------------------------  IN: 925 mL / OUT: 400 mL / NET: 525 mL    20 Apr 2021 07:01  -  20 Apr 2021 13:24  --------------------------------------------------------  IN: 450 mL / OUT: 400 mL / NET: 50 mL        Appearance: Normal	  Cardiovascular: Normal S1 S2,RRR, No JVD, No murmurs  Respiratory: Lungs clear to auscultation	  Gastrointestinal:  Soft, Non-tender, + BS	  Extremities: Normal range of motion, No clubbing, cyanosis or edema  Vascular: Peripheral pulses palpable 2+ bilaterally     LABS:	 	                          12.4   6.17  )-----------( 171      ( 20 Apr 2021 06:15 )             37.7     04-20    135  |  103  |  14  ----------------------------<  99  4.5   |  25  |  0.70    Ca    8.4      20 Apr 2021 06:15  Phos  2.2     04-20  Mg     1.9     04-20        PT/INR - ( 19 Apr 2021 03:47 )   PT: 18.7 sec;   INR: 1.67 ratio         PTT - ( 19 Apr 2021 03:47 )  PTT:34.9 sec    CARDIAC MARKERS:

## 2021-04-20 NOTE — CHART NOTE - NSCHARTNOTEFT_GEN_A_CORE
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

## 2021-04-20 NOTE — PROGRESS NOTE ADULT - ASSESSMENT
80 year old female with PMHx of HTN, ANCA vasculitis, Former smoker, presents with acute 9/10 stabbing, non-radiating chest pain with exertional shortness of breath with twi on ecg;  CTa revealed acute pe     1. Atypical chest pain   -in the setting of acute PE/ lung mass   -Ecg with twi noted to anterolateral leads  -HS trop neg  -ER echo with normal lv fxn  -full echo pending    2. 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 mass or cyst  -pulm following , med f/u   -Echo pending  -on Eliquis     3. Lung Mass, Mediastinal lymphadenopathy  -CT Angio revealed large b/l pulmonary masses.  -s/p  LVATS, Lung resection   -CTS f/u     4. ANCA Vasculitis  -med , id f/u

## 2021-04-20 NOTE — CHART NOTE - NSCHARTNOTESELECT_GEN_ALL_CORE
Rheumatology/Event Note
Home Oxygen Need
Home Oxygen Note/Event Note
Rheumatology/Event Note
cardiac clearance for OR needed/Event Note

## 2021-04-21 ENCOUNTER — TRANSCRIPTION ENCOUNTER (OUTPATIENT)
Age: 81
End: 2021-04-21

## 2021-04-21 VITALS
SYSTOLIC BLOOD PRESSURE: 143 MMHG | DIASTOLIC BLOOD PRESSURE: 58 MMHG | RESPIRATION RATE: 20 BRPM | OXYGEN SATURATION: 96 % | HEART RATE: 90 BPM

## 2021-04-21 LAB
ANION GAP SERPL CALC-SCNC: 8 MMOL/L — SIGNIFICANT CHANGE UP (ref 7–14)
BUN SERPL-MCNC: 12 MG/DL — SIGNIFICANT CHANGE UP (ref 7–23)
CALCIUM SERPL-MCNC: 8.5 MG/DL — SIGNIFICANT CHANGE UP (ref 8.4–10.5)
CHLORIDE SERPL-SCNC: 101 MMOL/L — SIGNIFICANT CHANGE UP (ref 98–107)
CO2 SERPL-SCNC: 24 MMOL/L — SIGNIFICANT CHANGE UP (ref 22–31)
CREAT SERPL-MCNC: 0.69 MG/DL — SIGNIFICANT CHANGE UP (ref 0.5–1.3)
CULTURE RESULTS: SIGNIFICANT CHANGE UP
GLUCOSE SERPL-MCNC: 93 MG/DL — SIGNIFICANT CHANGE UP (ref 70–99)
HCT VFR BLD CALC: 38 % — SIGNIFICANT CHANGE UP (ref 34.5–45)
HGB BLD-MCNC: 12.7 G/DL — SIGNIFICANT CHANGE UP (ref 11.5–15.5)
MAGNESIUM SERPL-MCNC: 2 MG/DL — SIGNIFICANT CHANGE UP (ref 1.6–2.6)
MCHC RBC-ENTMCNC: 33.4 GM/DL — SIGNIFICANT CHANGE UP (ref 32–36)
MCHC RBC-ENTMCNC: 35.4 PG — HIGH (ref 27–34)
MCV RBC AUTO: 105.8 FL — HIGH (ref 80–100)
NRBC # BLD: 0 /100 WBCS — SIGNIFICANT CHANGE UP
NRBC # FLD: 0 K/UL — SIGNIFICANT CHANGE UP
PHOSPHATE SERPL-MCNC: 2.6 MG/DL — SIGNIFICANT CHANGE UP (ref 2.5–4.5)
PLATELET # BLD AUTO: 197 K/UL — SIGNIFICANT CHANGE UP (ref 150–400)
POTASSIUM SERPL-MCNC: 4.3 MMOL/L — SIGNIFICANT CHANGE UP (ref 3.5–5.3)
POTASSIUM SERPL-SCNC: 4.3 MMOL/L — SIGNIFICANT CHANGE UP (ref 3.5–5.3)
RBC # BLD: 3.59 M/UL — LOW (ref 3.8–5.2)
RBC # FLD: 15.1 % — HIGH (ref 10.3–14.5)
SODIUM SERPL-SCNC: 133 MMOL/L — LOW (ref 135–145)
SPECIMEN SOURCE: SIGNIFICANT CHANGE UP
WBC # BLD: 5.94 K/UL — SIGNIFICANT CHANGE UP (ref 3.8–10.5)
WBC # FLD AUTO: 5.94 K/UL — SIGNIFICANT CHANGE UP (ref 3.8–10.5)

## 2021-04-21 PROCEDURE — 99233 SBSQ HOSP IP/OBS HIGH 50: CPT

## 2021-04-21 PROCEDURE — 93306 TTE W/DOPPLER COMPLETE: CPT | Mod: 26

## 2021-04-21 PROCEDURE — 71045 X-RAY EXAM CHEST 1 VIEW: CPT | Mod: 26

## 2021-04-21 RX ORDER — APIXABAN 2.5 MG/1
1 TABLET, FILM COATED ORAL
Qty: 60 | Refills: 0
Start: 2021-04-21 | End: 2021-05-20

## 2021-04-21 RX ORDER — APIXABAN 2.5 MG/1
2 TABLET, FILM COATED ORAL
Qty: 14 | Refills: 0
Start: 2021-04-21 | End: 2021-04-24

## 2021-04-21 RX ADMIN — Medication 650 MILLIGRAM(S): at 12:05

## 2021-04-21 RX ADMIN — Medication 650 MILLIGRAM(S): at 05:07

## 2021-04-21 RX ADMIN — Medication 650 MILLIGRAM(S): at 16:31

## 2021-04-21 RX ADMIN — CITALOPRAM 10 MILLIGRAM(S): 10 TABLET, FILM COATED ORAL at 12:05

## 2021-04-21 RX ADMIN — Medication 650 MILLIGRAM(S): at 18:14

## 2021-04-21 RX ADMIN — APIXABAN 10 MILLIGRAM(S): 2.5 TABLET, FILM COATED ORAL at 05:07

## 2021-04-21 RX ADMIN — POLYETHYLENE GLYCOL 3350 17 GRAM(S): 17 POWDER, FOR SOLUTION ORAL at 12:05

## 2021-04-21 RX ADMIN — APIXABAN 10 MILLIGRAM(S): 2.5 TABLET, FILM COATED ORAL at 18:14

## 2021-04-21 NOTE — DISCHARGE NOTE NURSING/CASE MANAGEMENT/SOCIAL WORK - NSSCNAMETXT_GEN_ALL_CORE
[facility] [place] [phone] initial visit will be day after discharge home. A nurse will call prior to the home visit.

## 2021-04-21 NOTE — PROGRESS NOTE ADULT - ASSESSMENT
80 year old female with PMHx of HTN, ANCA vasculitis (dx 20 years ago, been on Azathioprine since then, being followed by Rheum outpt), nonsmoker, presents with acute 9/10 stabbing, non-radiating chest pain lasting 10-15 mins. with exertional shortness of breath. EKG showing TWI in the lateral and anterior leads. Patient reported recent travel to [place] in car (~10 hrs ride).     # Acute pulmonary embolus, first episode:  Prolonged car ride and recent sedentary life style during pandemic. no family hx.  CTa chest reviewed. large pulm masses b/l noted: infectious vs. inflammatory. vs. malignancy induced   Pulm Consulted. s/p biopsy/VATs 4/16/21. prelimp likely malignancy, but will wait for final results  LE dopplers: +DVT.   TTE pending-   CT-ICU care appreciated. chest tube removed.   Hep gtt switched to Eliquis. Tolerating.  Check spO2 on room air and on exertion to see if the pt needs home O2.     # hx ANCA vasculitis:   Chronic, stable, hold home medication regime Azathioprine 150MG daily while waiting for biopsy results.  can likely resume if no infection. rheum eval appreciated.     # upper pole of right kidney: complex cyst? outpt MRI. also biopsy of lung mass as above.    # HTN: monitor BP    Physical therapy. Out of bed to chair with assistance. monitor O2 status.   # DVT ppx: On Hep gtt --> Eliquis  d/w the daughter Leeann and pt, all info can go to her (the daughter), she will inform the rest of the family members.   Pt prefers to go home, as oppose to rehab.     d/w CT surgery.   dc planning pending TTE    [facility]  [phone]

## 2021-04-21 NOTE — DISCHARGE NOTE PROVIDER - NSDCCPCAREPLAN_GEN_ALL_CORE_FT
PRINCIPAL DISCHARGE DIAGNOSIS  Diagnosis: Pulmonary embolism  Assessment and Plan of Treatment: Please follow up with your primary care provider within 1 week. This is imperative as you are on anticoagulation (blood thinners) for the blood clots in your lung and legs.      SECONDARY DISCHARGE DIAGNOSES  Diagnosis: ANCA-positive vasculitis  Assessment and Plan of Treatment: Continue your current treatment regimen as directed by your primary care provider. Please also follow up with your primary care provider in one week.    Diagnosis: Lung mass  Assessment and Plan of Treatment: Please follow up with Dr. Pettit in the office in 2 weeks. Please call and make an appointment. You may shower but remove all dressings first. Leave the white steri strips in place, they can get wet and they will fall off on their own. You may eat a regular diet. Please remain active & ambulatory but refrain from any heavy lifting.

## 2021-04-21 NOTE — DISCHARGE NOTE PROVIDER - NSDCFUADDINST_GEN_ALL_CORE_FT
Please walk 4-5 x per day; increase as tolerated. You may climb stairs.   Continue to use the incentive spirometer.   You may keep wounds uncovered. Please shower daily with soap and water.   The suture will be removed in the office at the follow up appointment.   Please call the office at [phone] if you have fevers, chills, worsening shortness of breath, chest pain, warmth, redness or purulent discharge from the wound.

## 2021-04-21 NOTE — PROGRESS NOTE ADULT - ASSESSMENT
Pt is a 80W w/ PMHx of HTN, ANCA vasculitis, p/w with acute 9/10 stabbing, non-radiating chest pain with exertional shortness of breath. Not associated with n/v and improved when laying down, patient was seen in the urgent care and had EKG showing TWI in the lateral and anterior leads. Patient reported recent travel to [place] in car but no LE edema or hx blood clots.    B/l lung masses concerning for infection vs inflammation vs malignancy  CT imaging showing Bilateral masslike and nodular consolidations measuring up to 8.6 cm as described above. Differential diagnosis include infectious/inflammatory or neoplastic etiologies and mediastinal lymphadenopathy.  s/p VATS/lung bx 4/16  -fungal markers negative  -QuantiFeron negative  -bacterial/AFB negative; pending fungal TCx--please f/u final cx  -pending path    Acute PE  Chest Pain  CTA chest w/ Right lower and middle lobe pulmonary arterial emboli also w/ lung nodules  AC and additional management per primary team    ANCA Vasculitis  No hx of biologic use    Pt can make appt with me to follow up additional results by calling [phone] if being planned for d/c.  Infectious Diseases will sign off at this time, please call back with any questions or concerns  Deonna Mclean M.D.  [facility], Division of Infectious Diseases [phone]  For over the weekend and after hours, please call [phone]

## 2021-04-21 NOTE — DISCHARGE NOTE PROVIDER - NSDCCPTREATMENT_GEN_ALL_CORE_FT
PRINCIPAL PROCEDURE  Procedure: Wedge resection of left lung with video-assisted thoracoscopic surgery (VATS)  Findings and Treatment:

## 2021-04-21 NOTE — PROGRESS NOTE ADULT - SUBJECTIVE AND OBJECTIVE BOX
[facility] [address], Division of Infectious Diseases  ANDREW Rodríguez Y. Patel, S. Shah  [phone]    Name: BETTIE PRATER  Age: 80y  Gender: Female  MRN: [id number]  Note Date: 04-20-21    Interval History:  No acute overnight events. Afebrile  Continues to remain on NC  Notes reviewed    Antibiotics:      Medications:  acetaminophen   Tablet .. 650 milliGRAM(s) Oral every 6 hours  apixaban 10 milliGRAM(s) Oral two times a day  citalopram 10 milliGRAM(s) Oral daily  lidocaine   Patch 1 Patch Transdermal every 24 hours  ondansetron Injectable 4 milliGRAM(s) IV Push every 6 hours PRN  polyethylene glycol 3350 17 Gram(s) Oral daily  simvastatin 10 milliGRAM(s) Oral at bedtime  traMADol 50 milliGRAM(s) Oral every 6 hours PRN      Review of Systems:  A 10-point review of systems was obtained.     Pertinent positives and negatives--  Constitutional: No fevers. No Chills. No Rigors.   Cardiovascular: No chest pain. No palpitations.  Respiratory: No shortness of breath. No cough.  Gastrointestinal: No nausea or vomiting. No diarrhea or constipation.   Psychiatric: Pleasant. Appropriate affect.    Review of systems otherwise negative except as previously noted.    Allergies: codeine (Nausea)  penicillin (Hives)    For details regarding the patient's past medical history, social history, family history, and other miscellaneous elements, please refer the initial infectious diseases consultation and/or the admitting history and physical examination for this admission.    Objective:  Vital Signs Last 24 Hrs  T(C): 37.2 (21 Apr 2021 04:00), Max: 37.2 (21 Apr 2021 04:00)  T(F): 98.9 (21 Apr 2021 04:00), Max: 98.9 (21 Apr 2021 04:00)  HR: 87 (21 Apr 2021 07:00) (81 - 102)  BP: 158/64 (21 Apr 2021 07:00) (126/55 - 177/67)  BP(mean): 89 (21 Apr 2021 07:00) (73 - 100)  RR: 21 (21 Apr 2021 07:00) (16 - 25)  SpO2: 93% (21 Apr 2021 07:00) (92% - 99%)    Physical Examination:  General: no acute distress  HEENT: NC/AT, EOMI, anicteric, no oral lesions  Neck: supple, no palpable LAD  Cardio: S1, S2 heard, RRR, no murmurs  Resp: decreased b/l breath sounds on NC  Abd: soft, NT, ND, + bowel sounds  Ext: no edema or cyanosis  Skin: warm, dry, no visible rash      Laboratory Studies:                        12.7   5.94  )-----------( 197      ( 21 Apr 2021 04:30 )             38.0   04-21    133<L>  |  101  |  12  ----------------------------<  93  4.3   |  24  |  0.69    Ca    8.5      21 Apr 2021 04:30  Phos  2.6     04-21  Mg     2.0     04-21          Microbiology: reviewed    Culture - Acid Fast - Tissue w/Smear (collected 04-16-21 @ 21:17)  Source: .Tissue 1. LEFT UPER LOBE NODULE    Culture - Fungal, Tissue (collected 04-16-21 @ 21:17)  Source: .Tissue 1. LEFT UPER LOBE NODULE  Preliminary Report (04-19-21 @ 09:04):    Testing in progress    Culture - Tissue with Gram Stain (collected 04-16-21 @ 21:17)  Source: .Tissue 1. LEFT UPER LOBE NODULE  Gram Stain (04-16-21 @ 23:45):    No polymorphonuclear cells seen per low power field    No organisms seen per oil power field  Preliminary Report (04-17-21 @ 18:25):    No growth        Radiology: reviewed

## 2021-04-21 NOTE — DISCHARGE NOTE PROVIDER - CARE PROVIDER_API CALL
Triston Pettit)  Surgery; Thoracic Surgery  [phone] [place] [address], [place] [address]  Phone: [phone]  Fax: [phone]  Follow Up Time: 2 weeks

## 2021-04-21 NOTE — PROGRESS NOTE ADULT - PROVIDER SPECIALTY LIST ADULT
Critical Care
Infectious Disease
Infectious Disease
Internal Medicine
Internal Medicine
Anesthesia
Cardiology
Infectious Disease
Pulmonology
Pulmonology
Cardiology
Cardiology
Critical Care
Critical Care
Infectious Disease
Internal Medicine
Pulmonology
Cardiology
Critical Care
Infectious Disease
Internal Medicine
Internal Medicine
Pain Medicine
Pulmonology
Pulmonology
Cardiology
Cardiology
Pain Medicine
Pulmonology
Cardiology

## 2021-04-21 NOTE — PROGRESS NOTE ADULT - NSICDXPILOT_GEN_ALL_CORE
[place]

## 2021-04-21 NOTE — DISCHARGE NOTE PROVIDER - NSDCFUADDAPPT_GEN_ALL_CORE_FT
Follow up with Dr. Pettit in 1-2 weeks [phone].   Have your Chest x-ray done 1-2 days prior to your appointment.    Please bring copy of x-ray to your appointment.  Follow up with primary care provider in one week.  Follow up with Dr. Pettit in 1-2 weeks [phone].   Have your Chest x-ray done 1-2 days prior to your appointment.    Please bring copy of x-ray to your appointment.  Follow up with primary care provider in one week. This is imperative as you are on blood thinners for the blood clots in your lungs and legs.

## 2021-04-21 NOTE — PROGRESS NOTE ADULT - SUBJECTIVE AND OBJECTIVE BOX
CARDIOLOGY FOLLOW UP - Dr. Hooker  DATE OF SERVICE: 4/21/21     CC no cp or sob        REVIEW OF SYSTEMS:  CONSTITUTIONAL: No fever, weight loss, or fatigue  RESPIRATORY: No cough, wheezing, chills or hemoptysis; No Shortness of Breath  CARDIOVASCULAR: No chest pain, palpitations, passing out, dizziness, or leg swelling  GASTROINTESTINAL: No abdominal or epigastric pain. No nausea, vomiting, or hematemesis; No diarrhea or constipation. No melena or hematochezia.  VASCULAR: No edema     PHYSICAL EXAM:  T(C): 36.7 (04-21-21 @ 08:00), Max: 37.2 (04-21-21 @ 04:00)  HR: 86 (04-21-21 @ 10:00) (80 - 102)  BP: 139/60 (04-21-21 @ 10:00) (137/52 - 177/67)  RR: 20 (04-21-21 @ 10:00) (19 - 25)  SpO2: 95% (04-21-21 @ 10:00) (92% - 99%)  Wt(kg): --  I&O's Summary    20 Apr 2021 07:01  -  21 Apr 2021 07:00  --------------------------------------------------------  IN: 900 mL / OUT: 1650 mL / NET: -750 mL    21 Apr 2021 07:01  -  21 Apr 2021 12:21  --------------------------------------------------------  IN: 200 mL / OUT: 800 mL / NET: -600 mL        Appearance: Normal	  Cardiovascular: Normal S1 S2,RR  Respiratory: Lungs clear to auscultation	  Gastrointestinal:  Soft, Non-tender, + BS	  Extremities: Normal range of motion, No clubbing, cyanosis or edema      Home Medications:      MEDICATIONS  (STANDING):  acetaminophen   Tablet .. 650 milliGRAM(s) Oral every 6 hours  apixaban 10 milliGRAM(s) Oral two times a day  citalopram 10 milliGRAM(s) Oral daily  polyethylene glycol 3350 17 Gram(s) Oral daily  simvastatin 10 milliGRAM(s) Oral at bedtime      TELEMETRY: nsr  	    ECG:  	  RADIOLOGY:   DIAGNOSTIC TESTING:  [ ] Echocardiogram:  [ ]  Catheterization:  [ ] Stress Test:    OTHER: 	    LABS:	 	                            12.7   5.94  )-----------( 197      ( 21 Apr 2021 04:30 )             38.0     04-21    133<L>  |  101  |  12  ----------------------------<  93  4.3   |  24  |  0.69    Ca    8.5      21 Apr 2021 04:30  Phos  2.6     04-21  Mg     2.0     04-21               CARDIOLOGY FOLLOW UP - Dr. Hooker  DATE OF SERVICE: 4/21/21     CC no cp or sob        REVIEW OF SYSTEMS:  CONSTITUTIONAL: No fever, weight loss, or fatigue  RESPIRATORY: No cough, wheezing, chills or hemoptysis; No Shortness of Breath  CARDIOVASCULAR: No chest pain, palpitations, passing out, dizziness, or leg swelling  GASTROINTESTINAL: No abdominal or epigastric pain. No nausea, vomiting, or hematemesis; No diarrhea or constipation. No melena or hematochezia.  VASCULAR: No edema     PHYSICAL EXAM:  T(C): 36.7 (04-21-21 @ 08:00), Max: 37.2 (04-21-21 @ 04:00)  HR: 86 (04-21-21 @ 10:00) (80 - 102)  BP: 139/60 (04-21-21 @ 10:00) (137/52 - 177/67)  RR: 20 (04-21-21 @ 10:00) (19 - 25)  SpO2: 95% (04-21-21 @ 10:00) (92% - 99%)  Wt(kg): --  I&O's Summary    20 Apr 2021 07:01  -  21 Apr 2021 07:00  --------------------------------------------------------  IN: 900 mL / OUT: 1650 mL / NET: -750 mL    21 Apr 2021 07:01  -  21 Apr 2021 12:21  --------------------------------------------------------  IN: 200 mL / OUT: 800 mL / NET: -600 mL        Appearance: Normal	  Cardiovascular: Normal S1 S2,RR  Respiratory: Lungs clear to auscultation	  Gastrointestinal:  Soft, Non-tender, + BS	  Extremities: Normal range of motion, No clubbing, cyanosis or edema      Home Medications:      MEDICATIONS  (STANDING):  acetaminophen   Tablet .. 650 milliGRAM(s) Oral every 6 hours  apixaban 10 milliGRAM(s) Oral two times a day  citalopram 10 milliGRAM(s) Oral daily  polyethylene glycol 3350 17 Gram(s) Oral daily  simvastatin 10 milliGRAM(s) Oral at bedtime      TELEMETRY: nsr  	    ECG:  	  RADIOLOGY:   DIAGNOSTIC TESTING:  [ ] Echocardiogram:  [ ]  Catheterization:  [ ] Stress Test:    OTHER: 	    LABS:	 	                            12.7   5.94  )-----------( 197      ( 21 Apr 2021 04:30 )             38.0     04-21    133<L>  |  101  |  12  ----------------------------<  93  4.3   |  24  |  0.69    Ca    8.5      21 Apr 2021 04:30  Phos  2.6     04-21  Mg     2.0     04-21              MEDICATIONS  (STANDING):  acetaminophen   Tablet .. 650 milliGRAM(s) Oral every 6 hours  apixaban 10 milliGRAM(s) Oral two times a day  citalopram 10 milliGRAM(s) Oral daily  polyethylene glycol 3350 17 Gram(s) Oral daily  simvastatin 10 milliGRAM(s) Oral at bedtime

## 2021-04-21 NOTE — PROGRESS NOTE ADULT - ASSESSMENT
Patient is a 80 year old female with PMHx of HTN, ANCA vasculitis, non smoker, presents with acute 9/10 stabbing, non-radiating chest pain with exertional shortness of breath. Not associated with n/v and improved when laying down, patient was seen in the urgent care and had EKG showing TWI in the lateral and anterior leads. Patient reported recent travel to [place] in car but no LE edema or hx blood clots.    PE:  Lung masses  R renal infarct: CT showing possible R renal infarct, No complaints of R flank pain or urinary symptoms, Renal US IMPRESSION: Hypoechoic lesion in the upper pole of right kidney which corresponds to the cystic lesion identified on recent CT. This may represent a complex cyst or a solid renal mass and should be further characterized with a contrast-enhanced MRI on outpatient basis.  HTN:  ANCA Vasculitis:    4/14/2021:      PE: on heparin: get dopplers are postive and echo is still pending? noac ?  ANCA Vasculitis: on azathioprine: 150 mg per day : LFTs are normal   Lung masses: DW LORRAINE: She has large masses on both sides and small nodule: RUL and LLL: may be exacerbation of vasculitis ?: core biopsy vs vats biopsy : get RHEUM involved and ID involved: ? fungal infection can happen: id consult: she does not look sick   No enlarged axillary lymph nodes. A few enlarged mediastinal lymph nodes with the largest in a precarinal location measuring about 1.3 cm.: dw lorraine: small : follow up   R renal infarct: CT showing possible R renal infarct, No complaints of R flank pain or urinary symptoms, Renal US IMPRESSION: Hypoechoic lesion in the upper pole of right kidney which corresponds to the cystic lesion identified on recent CT. This may represent a complex cyst or a solid renal mass and should be further characterized with a contrast-enhanced MRI on outpatient basis.  HTN: controlled  DW PMD     4/16:    PE: on heparin: for vats biopsy today   ANCA Vasculitis: on azathioprine: 150 mg per day : LFTs are normal  rheum consult pending  Lung masses: DW LORRAINE: She has large masses on both sides and small nodule: RUL and LLL: may be exacerbation of vasculitis ?: core biopsy vs vats biopsy : get RHEUM involved and ID involved: ? fungal infection can happen: id consult: she does not look sick   No enlarged axillary lymph nodes. A few enlarged mediastinal lymph nodes with the largest in a precarinal location measuring about 1.3 cm.: dw rake: small : follow up   R renal infarct: CT showing possible R renal infarct, No complaints of R flank pain or urinary symptoms, Renal US IMPRESSION: Hypoechoic lesion in the upper pole of right kidney which corresponds to the cystic lesion identified on recent CT. This may represent a complex cyst or a solid renal mass and should be further characterized with a contrast-enhanced MRI on outpatient basis.  HTN: controlled   PMD     4/18:      PE/dvt: on heparin: s/p vats biopsy : await path   ANCA Vasculitis: on azathioprine: 150 mg per day : LFTs are normal  rheum consult pending  Lung masses: DW RAKE: She has large masses on both sides and small nodule: RUL and LLL: may be exacerbation of vasculitis : biopsy done: await pathology   No enlarged axillary lymph nodes. A few enlarged mediastinal lymph nodes with the largest in a precarinal location measuring about 1.3 cm.: dw rake: small : follow up   R renal infarct: CT showing possible R renal infarct, No complaints of R flank pain or urinary symptoms, Renal US IMPRESSION: Hypoechoic lesion in the upper pole of right kidney which corresponds to the cystic lesion identified on recent CT. This may represent a complex cyst or a solid renal mass and should be further characterized with a contrast-enhanced MRI on outpatient basis.    HTN: controlled   PMD     4/19:      PE/dvt: on Eliquis: s/p vats biopsy : await path : no AFB   ANCA Vasculitis: on azathioprine: 150 mg per day : LFTs are normal  rheum follow p   Lung masses: DW RAKE: She has large masses on both sides and small nodule: RUL and LLL: may be exacerbation of vasculitis : biopsy done: await pathology   No enlarged axillary lymph nodes. A few enlarged mediastinal lymph nodes with the largest in a precarinal location measuring about 1.3 cm.: dw rake: small : follow up   R renal infarct: CT showing possible R renal infarct, No complaints of R flank pain or urinary symptoms, Renal US IMPRESSION: Hypoechoic lesion in the upper pole of right kidney which corresponds to the cystic lesion identified on recent CT. This may represent a complex cyst or a solid renal mass and should be further characterized with a contrast-enhanced MRI on outpatient basis.    HTN: controlled   team    4/20:      PE/dvt: on Eliquis: s/p vats biopsy : await path : no AFB   ANCA Vasculitis: on azathioprine: 150 mg per day : LFTs are normal  rheum follow p   Lung masses: DEMI PETERS: She has large masses on both sides and small nodule: RUL and LLL: may be exacerbation of vasculitis : biopsy done: await pathology   No enlarged axillary lymph nodes. A few enlarged mediastinal lymph nodes with the largest in a precarinal location measuring about 1.3 cm.: demi lorraine: small : follow up   R renal infarct: CT showing possible R renal infarct, No complaints of R flank pain or urinary symptoms, Renal US IMPRESSION: Hypoechoic lesion in the upper pole of right kidney which corresponds to the cystic lesion identified on recent CT. This may represent a complex cyst or a solid renal mass and should be further characterized with a contrast-enhanced MRI on outpatient basis.  HTN: controlled  awaiting dc ? taper oxygen off  DW team    4/21:      PE/dvt: on Eliquis: s/p vats biopsy : await path : no AFB : no events overnight: for dc today   ANCA Vasculitis: on azathioprine: 150 mg per day : LFTs are normal  rheum follow p   Lung masses: DW LORRAINE: She has large masses on both sides and small nodule: RUL and LLL: may be exacerbation of vasculitis : biopsy done: await pathology   No enlarged axillary lymph nodes. A few enlarged mediastinal lymph nodes with the largest in a precarinal location measuring about 1.3 cm.: demi saraviawilian: small : follow up   R renal infarct: CT showing possible R renal infarct, No complaints of R flank pain or urinary symptoms, Renal US IMPRESSION: Hypoechoic lesion in the upper pole of right kidney which corresponds to the cystic lesion identified on recent CT. This may represent a complex cyst or a solid renal mass and should be further characterized with a contrast-enhanced MRI on outpatient basis.  HTN: controlled  awaiting dc ? taper oxygen off  DW team

## 2021-04-21 NOTE — PROGRESS NOTE ADULT - SUBJECTIVE AND OBJECTIVE BOX
Date of Service: 04-21-21 @ 11:20    Patient is a 80y old  Female who presents with a chief complaint of chest pain (21 Apr 2021 09:49)      Any change in ROS: doing OK:    MEDICATIONS  (STANDING):  acetaminophen   Tablet .. 650 milliGRAM(s) Oral every 6 hours  apixaban 10 milliGRAM(s) Oral two times a day  citalopram 10 milliGRAM(s) Oral daily  lidocaine   Patch 1 Patch Transdermal every 24 hours  polyethylene glycol 3350 17 Gram(s) Oral daily  simvastatin 10 milliGRAM(s) Oral at bedtime    MEDICATIONS  (PRN):  ondansetron Injectable 4 milliGRAM(s) IV Push every 6 hours PRN Nausea  traMADol 50 milliGRAM(s) Oral every 6 hours PRN Moderate Pain (4 - 6)    Vital Signs Last 24 Hrs  T(C): 36.7 (21 Apr 2021 08:00), Max: 37.2 (21 Apr 2021 04:00)  T(F): 98.1 (21 Apr 2021 08:00), Max: 98.9 (21 Apr 2021 04:00)  HR: 86 (21 Apr 2021 10:00) (80 - 102)  BP: 139/60 (21 Apr 2021 10:00) (137/52 - 177/67)  BP(mean): 78 (21 Apr 2021 10:00) (73 - 115)  RR: 20 (21 Apr 2021 10:00) (16 - 25)  SpO2: 95% (21 Apr 2021 10:00) (92% - 99%)    I&O's Summary    20 Apr 2021 07:01  -  21 Apr 2021 07:00  --------------------------------------------------------  IN: 900 mL / OUT: 1650 mL / NET: -750 mL    21 Apr 2021 07:01  -  21 Apr 2021 11:20  --------------------------------------------------------  IN: 200 mL / OUT: 800 mL / NET: -600 mL          Physical Exam:   GENERAL: NAD, well-groomed, well-developed  HEENT: RANJIT/   Atraumatic, Normocephalic  ENMT: No tonsillar erythema, exudates, or enlargement; Moist mucous membranes, Good dentition, No lesions  NECK: Supple, No JVD, Normal thyroid  CHEST/LUNG: Clear to auscultaion  CVS: Regular rate and rhythm; No murmurs, rubs, or gallops  GI: : Soft, Nontender, Nondistended; Bowel sounds present  NERVOUS SYSTEM:  Alert & Oriented X3  EXTREMITIES: - edema  LYMPH: No lymphadenopathy noted  SKIN: No rashes or lesions  ENDOCRINOLOGY: No Thyromegaly  PSYCH: Appropriate    Labs:                              12.7   5.94  )-----------( 197      ( 21 Apr 2021 04:30 )             38.0                         12.4   6.17  )-----------( 171      ( 20 Apr 2021 06:15 )             37.7                         12.7   6.96  )-----------( 161      ( 19 Apr 2021 03:47 )             37.3                         12.3   6.47  )-----------( 137      ( 18 Apr 2021 03:41 )             36.8     04-21    133<L>  |  101  |  12  ----------------------------<  93  4.3   |  24  |  0.69  04-20    135  |  103  |  14  ----------------------------<  99  4.5   |  25  |  0.70  04-19    136  |  102  |  17  ----------------------------<  107<H>  4.4   |  23  |  0.64  04-18    130<L>  |  100  |  23  ----------------------------<  93  4.5   |  22  |  0.78    Ca    8.5      21 Apr 2021 04:30  Ca    8.4      20 Apr 2021 06:15  Phos  2.6     04-21  Phos  2.2     04-20  Mg     2.0     04-21  Mg     1.9     04-20      CAPILLARY BLOOD GLUCOSE              r< from: Xray Chest 1 View- PORTABLE-Routine (Xray Chest 1 View- PORTABLE-Routine in AM.) (04.19.21 @ 04:29) >    TECHNIQUE:   Portable chest    INTERPRETATION:    Pleural-based peripheral mass seen in the left upper lobe unaltered from the study of the day before. Small amount of subcutaneous emphysema remains. Lungs are otherwise clear, the heart is not enlarged and there is no effusion or pneumothorax.      COMPARISON:  April 18      IMPRESSION:  Status post left thoracotomy with peripheral left upper lobe mass like opacity.              DANIEL NASSAR MD; Attending Radiologist  This document has been electronically signed. Apr 19 2021  7:02AM    < end of copied text >          RECENT CULTURES:  04-16 @ 21:17 .Tissue 1. LEFT UPER LOBE NODULE       No polymorphonuclear cells seen per low power field  No organisms seen per oil power field           No growth          RESPIRATORY CULTURES:          Studies  Chest X-RAY  CT SCAN Chest   Venous Dopplers: LE:   CT Abdomen  Others

## 2021-04-21 NOTE — DISCHARGE NOTE PROVIDER - NSDCMRMEDTOKEN_GEN_ALL_CORE_FT
oxygen: HOME Oxygen  Portable and Concentrator  2L nasal cannula at all times  ANDREA = 99  dx I26.99   Eliquis 5 mg oral tablet: 2 tab(s) orally 2 times a day MDD:4 tabs  10 mg twice a day for seven additional doses  Eliquis 5 mg oral tablet: 1 tab(s) orally 2 times a day MDD:2 tabs

## 2021-04-21 NOTE — DISCHARGE NOTE NURSING/CASE MANAGEMENT/SOCIAL WORK - PATIENT PORTAL LINK FT
You can access the FollowMyHealth Patient Portal offered by [facility] by registering at the following website: [email or web address]. By joining [facility]’s FollowMyHealth portal, you will also be able to view your health information using other applications (apps) compatible with our system.

## 2021-04-21 NOTE — PROGRESS NOTE ADULT - TIME BILLING
.
Agree with above NP note.  cv stable  s/p VATS  a/c per thoracic
Agree with above NP note.  cv stable  80 year old woman with history of HTN, ANCA vasculitis, Former smoker, admitted with acute PE     Atypical chest pain   -in the setting of acute PE/lung mass   -HS trop neg  -er echo with normal lv function  -continue a/c for PE     Acute PE/Acute DVT   -CTA chest with RLL, RML PE  -  +b/l below the knee DVT   -cont a/c    Lung Mass, mediastinal lymphadenopathy  -pulm, thoracic f/u   -await VATS, Lung resection tomorrow  -ER echo with normal lv fxn  -remains cardiac optimized and can proceed with VATS/lung resection with acceptable cardiac risk
Agree with above NP note.  cv stable  s/p VATS  f/u pathology  a/c per thoracic  await full echo   cont care per icu
Agree with above NP note.  cv stable  s/p VATS  f/u pathology  a/c per thoracic  await full echo   cont care per icu
Agree with above NP note.  cv stable  await vATS per thoracic   a/c per thoracic

## 2021-04-21 NOTE — PROGRESS NOTE ADULT - SUBJECTIVE AND OBJECTIVE BOX
Patient is a 80y old  Female who presents with a chief complaint of chest pain (21 Apr 2021 16:03)      INTERVAL HPI/OVERNIGHT EVENTS: noted  pt seen and examined this am   events noted  feels well      Vital Signs Last 24 Hrs  T(C): 36.7 (21 Apr 2021 08:00), Max: 37.2 (21 Apr 2021 04:00)  T(F): 98.1 (21 Apr 2021 08:00), Max: 98.9 (21 Apr 2021 04:00)  HR: 90 (21 Apr 2021 12:00) (80 - 100)  BP: 143/58 (21 Apr 2021 12:00) (138/55 - 177/67)  BP(mean): 78 (21 Apr 2021 12:00) (74 - 115)  RR: 20 (21 Apr 2021 12:00) (20 - 25)  SpO2: 96% (21 Apr 2021 12:00) (92% - 96%)    acetaminophen   Tablet .. 650 milliGRAM(s) Oral every 6 hours  apixaban 10 milliGRAM(s) Oral two times a day  citalopram 10 milliGRAM(s) Oral daily  ondansetron Injectable 4 milliGRAM(s) IV Push every 6 hours PRN  polyethylene glycol 3350 17 Gram(s) Oral daily  simvastatin 10 milliGRAM(s) Oral at bedtime  traMADol 50 milliGRAM(s) Oral every 6 hours PRN      PHYSICAL EXAM:  GENERAL: NAD,   EYES: conjunctiva and sclera clear  ENMT: Moist mucous membranes  NECK: Supple, No JVD, Normal thyroid  CHEST/LUNG: non labored, cta b/l  HEART: Regular rate and rhythm; No murmurs, rubs, or gallops  ABDOMEN: Soft, Nontender, Nondistended; Bowel sounds present  EXTREMITIES:  2+ Peripheral Pulses, No clubbing, cyanosis, or edema  LYMPH: No lymphadenopathy noted  SKIN: No rashes or lesions    Consultant(s) Notes Reviewed:  [x ] YES  [ ] NO  Care Discussed with Consultants/Other Providers [ x] YES  [ ] NO    LABS:                        12.7   5.94  )-----------( 197      ( 21 Apr 2021 04:30 )             38.0     04-21    133<L>  |  101  |  12  ----------------------------<  93  4.3   |  24  |  0.69    Ca    8.5      21 Apr 2021 04:30  Phos  2.6     04-21  Mg     2.0     04-21          CAPILLARY BLOOD GLUCOSE                  RADIOLOGY & ADDITIONAL TESTS:    Imaging Personally Reviewed:  [x ] YES  [ ] NO

## 2021-04-21 NOTE — DISCHARGE NOTE PROVIDER - HOSPITAL COURSE
80 year old female with PMHx of HTN, ANCA vasculitis (dx 20 years ago, been on Azathioprine since then, being followed by Rheum outpt), nonsmoker, presents with acute 9/10 stabbing, non-radiating chest pain lasting 10-15 mins. with exertional shortness of breath. EKG showing TWI in the lateral and anterior leads. Patient reported recent travel to [place] in car (~10 hrs ride). On CTA she was found to have a pulmonary embolus & bilateral pulmonary masses & DVTs & was started on anticoagulation with a Heparin Drip. CT Surgery was consulted for the lung masses and taken to the operating room on 4/16/21 for a Left VATS & Left Upper Lobe wedge resection with Dr. Pettit. The procedure was uneventful as was her post-operative course besides some shortness of breath & desaturation off supplemental oxygen. She was then arranged for her to be discharged with home oxygen therapy.  Her chest tube was removed on post-op day #1 and anticoagulation with Eliquis was resumed. Also has a history of ANCA vasculitis and was seen by Rheumatology during her stay here. Cardiology was following her for her presentation with chest pain and an ECHO was done. She will follow up with Dr. Pettit in 2 weeks.

## 2021-04-21 NOTE — PROGRESS NOTE ADULT - REASON FOR ADMISSION
chest pain

## 2021-04-21 NOTE — PROGRESS NOTE ADULT - SUBJECTIVE AND OBJECTIVE BOX
JOSIBETTIE      80y   Female   MRN-[id number]         codeine (Nausea)  penicillin (Hives)             Daily     Daily Drug Dosing Weight  Height (cm): 152.4 (16 Apr 2021 05:47)  Weight (kg): 69.2 (16 Apr 2021 05:47)  BMI (kg/m2): 29.8 (16 Apr 2021 05:47)  BSA (m2): 1.66 (16 Apr 2021 05:47)        Patient is a 80 year old female with PMHx of HTN, ANCA vasculitis, non smoker, presents with acute 9/10 stabbing, non-radiating chest pain with exertional shortness of breath. Not associated with n/v and improved when laying down, patient was seen in the urgent care and had EKG showing TWI in the lateral and anterior leads. Patient reported recent travel to [place] in car but no LE edema or hx blood clots. (14 Apr 2021 10:47)      Procedure:  Left VATS, Left upper lobe wedge resection 16-Apr-2021                        Issues:  Acute PE (RML RLL Pulm arteries)  Lung mass  MASON  Post op pain  Bilateral DVT (Peroneal and Tibia)  ANCA Vasculitis  HTN                 Home Medications:      PAST MEDICAL & SURGICAL HISTORY:  Vasculitis    ANCA-associated vasculitis    No significant past surgical history      Vital Signs Last 24 Hrs  T(C): 37.2 (21 Apr 2021 04:00), Max: 37.2 (21 Apr 2021 04:00)  T(F): 98.9 (21 Apr 2021 04:00), Max: 98.9 (21 Apr 2021 04:00)  HR: 87 (21 Apr 2021 07:00) (81 - 102)  BP: 158/64 (21 Apr 2021 07:00) (137/52 - 177/67)  BP(mean): 89 (21 Apr 2021 07:00) (73 - 100)  RR: 21 (21 Apr 2021 07:00) (16 - 25)  SpO2: 93% (21 Apr 2021 07:00) (92% - 99%)  I&O's Detail    20 Apr 2021 07:01  -  21 Apr 2021 07:00  --------------------------------------------------------  IN:    Oral Fluid: 900 mL  Total IN: 900 mL    OUT:    Voided (mL): 1650 mL  Total OUT: 1650 mL    Total NET: -750 mL      CAPILLARY BLOOD GLUCOSE      Home Medications:      MEDICATIONS  (STANDING):  acetaminophen   Tablet .. 650 milliGRAM(s) Oral every 6 hours  apixaban 10 milliGRAM(s) Oral two times a day  citalopram 10 milliGRAM(s) Oral daily  lidocaine   Patch 1 Patch Transdermal every 24 hours  polyethylene glycol 3350 17 Gram(s) Oral daily  simvastatin 10 milliGRAM(s) Oral at bedtime    MEDICATIONS  (PRN):  ondansetron Injectable 4 milliGRAM(s) IV Push every 6 hours PRN Nausea  traMADol 50 milliGRAM(s) Oral every 6 hours PRN Moderate Pain (4 - 6)          Physical exam:     General:               Pt is awake, alert,  appears to be in pain but not in  distress                                                  Neuro:                  Nonfocal                             Cardiovascular:   S1 & S2, regular                           Respiratory:         Air entry is fair and equal on both sides, has bilateral conducted sounds                           GI:                          Soft, nondistended and nontender, Bowel sounds active                            Ext:                        No cyanosis or edema                             Labs:                                                                           12.7   5.94  )-----------( 197      ( 21 Apr 2021 04:30 )             38.0             04-21    133<L>  |  101  |  12  ----------------------------<  93  4.3   |  24  |  0.69    Ca    8.5      21 Apr 2021 04:30  Phos  2.6     04-21  Mg     2.0     04-21                      CXR:    < from: Xray Chest 1 View- PORTABLE-Routine (Xray Chest 1 View- PORTABLE-Routine in AM.) (04.19.21 @ 04:29) >  Pleural-based peripheral mass seen in the left upper lobe unaltered from the study of the day before. Small amount of subcutaneous emphysema remains. Lungs are otherwise clear, the heart is not enlarged and there is no effusion or pneumothorax.    COMPARISON:  April 18    IMPRESSION:  Status post left thoracotomy with peripheral left upper lobe mass like opacity.      Plan:    General:   80yFemale s/p Left VATS, Left upper lobe wedge resection 16-Apr-2021 , experiencing  pain with deep breathing.                             Neuro:                                         Pain control with Tramadol /  Tylenol PRN                            Cardiovascular:                                          HTN: Continue hemodynamic monitoring. Not on ant-hypertensive meds at this time    HLD: On Zocar    PE / DVT: Continue Eliquis  Needs O2 via NC at home                             Respiratory:                                         Pt is on 2L  nasal canula, desaturated when off. Needs O2 via NC at home                                          Comfortable, not in any distress.                                         Encourage incentive spirometry     PE / DVT: Continue Eliquis                                                                Continue bronchodilators, pulmonary toilet - PRN                            GI                                         On  DASH  diet as tolerated                                         Continue Zofran / Reglan for nausea - PRN	                                                                 Renal:                                                                                  Monitor I/Os and electrolytes                                                                                        Hem/ Onc:                                                                                  No signs of bleeding.                                                                   Infectious disease:      Pt will f/u with I.D                                           Monitor for fever / leukocytosis.                                          All surgical incision / chest tube  sites look clean                            Endocrine                                             Continue Accu-Checks with coverage    ANCA Vasculitis:        Currently off Azathioprine pending biopsy results       Rheumatology f/u      Pertinent clinical, laboratory, radiographic, hemodynamic, echocardiographic, respiratory data, microbiologic data and chart were reviewed and analyzed frequently throughout the course of the day and night  Patient seen, examined and plan discussed with CT Surgeon Dr. Pettit / CTICU team during rounds.    Status discussed with patient /  updated plan of care          Isra Garber MD

## 2021-04-23 LAB — SURGICAL PATHOLOGY STUDY: SIGNIFICANT CHANGE UP

## 2021-05-04 ENCOUNTER — OUTPATIENT (OUTPATIENT)
Dept: OUTPATIENT SERVICES | Facility: HOSPITAL | Age: 81
LOS: 1 days | End: 2021-05-04
Payer: MEDICARE

## 2021-05-04 ENCOUNTER — APPOINTMENT (OUTPATIENT)
Dept: THORACIC SURGERY | Facility: CLINIC | Age: 81
End: 2021-05-04
Payer: COMMERCIAL

## 2021-05-04 ENCOUNTER — RESULT REVIEW (OUTPATIENT)
Age: 81
End: 2021-05-04

## 2021-05-04 ENCOUNTER — NON-APPOINTMENT (OUTPATIENT)
Age: 81
End: 2021-05-04

## 2021-05-04 ENCOUNTER — APPOINTMENT (OUTPATIENT)
Dept: RADIOLOGY | Facility: HOSPITAL | Age: 81
End: 2021-05-04

## 2021-05-04 VITALS
TEMPERATURE: 97.8 F | HEIGHT: 60 IN | WEIGHT: 144 LBS | SYSTOLIC BLOOD PRESSURE: 155 MMHG | DIASTOLIC BLOOD PRESSURE: 80 MMHG | OXYGEN SATURATION: 97 % | BODY MASS INDEX: 28.27 KG/M2 | HEART RATE: 83 BPM

## 2021-05-04 DIAGNOSIS — R91.1 SOLITARY PULMONARY NODULE: ICD-10-CM

## 2021-05-04 PROCEDURE — 99024 POSTOP FOLLOW-UP VISIT: CPT

## 2021-05-04 PROCEDURE — 71046 X-RAY EXAM CHEST 2 VIEWS: CPT | Mod: 26

## 2021-05-07 ENCOUNTER — APPOINTMENT (OUTPATIENT)
Dept: HEMATOLOGY ONCOLOGY | Facility: CLINIC | Age: 81
End: 2021-05-07
Payer: MEDICARE

## 2021-05-07 VITALS — WEIGHT: 149 LBS | BODY MASS INDEX: 27.42 KG/M2 | HEART RATE: 80 BPM | OXYGEN SATURATION: 96 % | HEIGHT: 62 IN

## 2021-05-07 DIAGNOSIS — K21.9 GASTRO-ESOPHAGEAL REFLUX DISEASE W/OUT ESOPHAGITIS: ICD-10-CM

## 2021-05-07 PROCEDURE — 99072 ADDL SUPL MATRL&STAF TM PHE: CPT

## 2021-05-07 PROCEDURE — 85025 COMPLETE CBC W/AUTO DIFF WBC: CPT | Mod: GC

## 2021-05-07 PROCEDURE — 99205 OFFICE O/P NEW HI 60 MIN: CPT | Mod: GC

## 2021-05-07 RX ORDER — CHOLECALCIFEROL (VITAMIN D3) 25 MCG
TABLET ORAL
Refills: 0 | Status: ACTIVE | COMMUNITY

## 2021-05-07 RX ORDER — OMEPRAZOLE 40 MG/1
CAPSULE, DELAYED RELEASE ORAL
Refills: 0 | Status: ACTIVE | COMMUNITY

## 2021-05-08 LAB
ALBUMIN SERPL ELPH-MCNC: 4 G/DL
ALP BLD-CCNC: 76 U/L
ALT SERPL-CCNC: 12 U/L
ANION GAP SERPL CALC-SCNC: 11 MMOL/L
AST SERPL-CCNC: 29 U/L
B2 MICROGLOB SERPL-MCNC: 4.7 MG/L
BILIRUB SERPL-MCNC: 0.5 MG/DL
BUN SERPL-MCNC: 23 MG/DL
CALCIUM SERPL-MCNC: 9.2 MG/DL
CD3 CELLS # BLD: 1417 /UL
CD3 CELLS NFR BLD: 93 %
CD3+CD4+ CELLS # BLD: 882 /UL
CD3+CD4+ CELLS NFR BLD: 58 %
CD3+CD4+ CELLS/CD3+CD8+ CLL SPEC: 1.63 RATIO
CD3+CD8+ CELLS # SPEC: 542 /UL
CD3+CD8+ CELLS NFR BLD: 35 %
CHLORIDE SERPL-SCNC: 105 MMOL/L
CO2 SERPL-SCNC: 24 MMOL/L
CREAT SERPL-MCNC: 1.2 MG/DL
GLUCOSE SERPL-MCNC: 105 MG/DL
HBV CORE IGG+IGM SER QL: NONREACTIVE
HBV SURFACE AB SER QL: NONREACTIVE
HBV SURFACE AG SER QL: NONREACTIVE
HCV AB SER QL: NONREACTIVE
HCV S/CO RATIO: 0.14 S/CO
HIV1+2 AB SPEC QL IA.RAPID: NONREACTIVE
LDH SERPL-CCNC: 277 U/L
MAGNESIUM SERPL-MCNC: 2.1 MG/DL
PHOSPHATE SERPL-MCNC: 3.2 MG/DL
POTASSIUM SERPL-SCNC: 4.3 MMOL/L
PROT SERPL-MCNC: 7.1 G/DL
SODIUM SERPL-SCNC: 140 MMOL/L
URATE SERPL-MCNC: 6.5 MG/DL

## 2021-05-08 NOTE — RESULTS/DATA
[FreeTextEntry1] : cbc done 5/7/21 reviewed\par wbc - 5.6\par hgb - 12.8\par plt - 310\par anc - 3.47\par alc - 1.42\par \par Final Diagnosis\par 1. 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 Suggest correlation with clinical\par and therapeutic history for underlying immune deficiency; test\par for EBV viral load is recommended.\par \par Microscopic description: Sections show dense lymphoid infiltrate\par with necrosis; predominantly medium-large sized cells forming\par larger clusters and sheets. Angiocentric and angiodestructive\par pattern is noted; confirmed by elastin stain on block 2B.\par Immunohistochemical stains for CD3, CD20, CD79a, Pax5, ,\par Bcl-2, Bcl-6, Mum-1, CD5, CD10, CD23, CD30, CD43, cyclin D1,\par cMYC, p53, and Ki-67, AE1/AE3,  in situ hybridization for\par Renay-Barr virus-\par -encoded small RNA (VEENA), kappa and lambda\par light chains were performed on block 2B. Lymphoma cells are B\par cells, positive VEENA, CD20, CD79a, Pax5 (dim), Mum-1, Bcl2, CD43,\par variable CD30, negative CD5, CD10, CD23, cyclin D1, p53 (<5%),\par cMYC (<20%). Ki-67 proliferation index is high (>90%). CD3+ T\par cells comprise a minor population in the infiltrate.  stains\par few plasma cells; B cells are lambda-restricted.\par Cam 5.2, P40, TTF-1, synaptohysing, chromogranin, PAX-8 and MALU-\par 3 were performed on block 2C. They are negative in lymphoma\par cells.\par \par

## 2021-05-08 NOTE — ASSESSMENT
[FreeTextEntry1] : Ms. Magallon is a 79 yo female with PMHx of  ANCA vasculitis (dx 20 years ago, been on Azathioprine since then, being followed by Rheum outpt) who presents today for initial consultation for newly Diagnosed Diffuse Large B cell Lymphoma. Pt presented tot he ER on 4/13/21 with chest pain and exertional shortness of breath. Patient reported recent travel to [place] in car (~10 hrs ride). She had a CT angio done which showed right lower lobe and middle lobe pulmonary arterial emboli. She was noted to have B/l LE DVTs - she was started on heparin and switched to Eliquis. Pt need to have chest tube placement as well, discharged with home O2. .\par \par In addition pt was found to have large b/l lung masses measuring up to 8.6cm. \par Pulm was consulted and pt underwent biopsy/VATs on 4/16/21, path showed  EBV+ diffuse large B-cell lymphoma. Lymphoma cells are B cells, positive VEENA, CD20, CD79a, Pax5 (dim), Mum-1, Bcl2, CD43,variable CD30, negative CD5, CD10, CD23, cyclin D1, p53 (<5%), cMYC (<20%). Ki-67 proliferation index is high (>90%). CD3+ T cells comprise a minor population in the infiltrate.  stains few plasma cells; B cells are lambda-restricted. \par \par \par \par #Diffuse Large B Cell Lymphoma EBV positive\par - Discussed with patient and her sister in law the nature of the disease. We discussed the need to start treatment with chemotherapy with [facility] for curative intent. Went over the risks and side effects of the regimen including - infusion reactions, lower of blood counts, infections, reactivation of Hep B (labs sent out today). Pt agrees to therapy and consent was signed. Info on chemo was printed and given to patient.\par - Pt will need PET/CT scan for staging\par -pt needs Port placement - order is in\par - Echo was done inpatient on 4/21/21 with an EF of 69%\par -may need LP pending PET/CT to rule out CNS disease given renal mass \par \par #PE/DVT\par - Pt is currently on Eliquis\par -would continue until 6 months after CR from [facility] \par \par #Right Kidney lesion\par - Pt was noted to have a hypodense lesion on right upper pole on CT done 4/13/21\par - contisder MRI after PET/CT for further \par \par #ANCA positive vasculitis \par -hold azathioprine\par -will discus case with Dr. Romano- rituximab should maintain her remission \par -monitor for symptoms of recurrence (bone/back pain) \par \par Follow up in 1-2 weeks

## 2021-05-08 NOTE — REVIEW OF SYSTEMS
[Fatigue] : fatigue [Shortness Of Breath] : shortness of breath [Joint Pain] : joint pain [Negative] : Allergic/Immunologic [FreeTextEntry7] : Refulx

## 2021-05-08 NOTE — PHYSICAL EXAM
[Restricted in physically strenuous activity but ambulatory and able to carry out work of a light or sedentary nature] : Status 1- Restricted in physically strenuous activity but ambulatory and able to carry out work of a light or sedentary nature, e.g., light house work, office work [Normal] : affect appropriate [de-identified] : healing left chest wound

## 2021-05-08 NOTE — HISTORY OF PRESENT ILLNESS
[de-identified] : Ms. Magallon is a 79 yo female with PMHx of ANCA vasculitis (dx 20 years ago, been on Azathioprine since then, being followed by Rheum outpt) who presents today for initial consultation for newly Diagnosed Diffuse Large B cell Lymphoma. Pt noticed earlier this year that she had more SOB with activity and fatigue. She did not have night sweats, fever/chills or weight loss with this fatigue.\par Pt presented tot he ER on 4/13/21 with chest pain lasting and exertional shortness of breath. Patient reported recent travel to [place] in car (~10 hrs ride). She had a CT angio done which showed right lower lobe and middle lobe pulmonary arterial emboli. She was noted to have B/l LE DVTs - she was started on heparin and switched to Eliquis. Pt need to have chest tuble placement as well and was d/c on O2 therapy. Pt states she used O2 for the first few days at home but does not currently use it. O2 stats at home have been in the 90s\par In addition pt was found to have large b/l lung masses measuring up to 8.6cm. \par Pulm was consulted and pt underwent biopsy/VATs on 4/16/21, path showed  EBV+ diffuse large B-cell lymphoma. Lymphoma cells are B cells, positive VEENA, CD20, CD79a, Pax5 (dim), Mum-1, Bcl2, CD43,variable CD30, negative CD5, CD10, CD23, cyclin D1, p53 (<5%), cMYC (<20%). Ki-67 proliferation index is high (>90%). CD3+ T cells comprise a minor population in the infiltrate.  stains few plasma cells; B cells are lambda-restricted. Cam 5.2, P40, TTF-1, synaptohysing, chromogranin, PAX-8 and MALU-3 were performed on block 2C. They are negative in lymphoma cells.\par \par Since her hospital admission pt noticed she had lost ~7lbs. She continues to have fatigue and gets SOB with activity. Pt is able to walk a few blocks without needing to stop to rest. Appetite has decreased as well. \par

## 2021-05-10 ENCOUNTER — APPOINTMENT (OUTPATIENT)
Dept: NUCLEAR MEDICINE | Facility: IMAGING CENTER | Age: 81
End: 2021-05-10
Payer: MEDICARE

## 2021-05-10 ENCOUNTER — OUTPATIENT (OUTPATIENT)
Dept: OUTPATIENT SERVICES | Facility: HOSPITAL | Age: 81
LOS: 1 days | End: 2021-05-10
Payer: MEDICARE

## 2021-05-10 DIAGNOSIS — C85.90 NON-HODGKIN LYMPHOMA, UNSPECIFIED, UNSPECIFIED SITE: ICD-10-CM

## 2021-05-10 PROCEDURE — 78815 PET IMAGE W/CT SKULL-THIGH: CPT | Mod: 26,PI,MH

## 2021-05-10 PROCEDURE — 78815 PET IMAGE W/CT SKULL-THIGH: CPT

## 2021-05-10 PROCEDURE — A9552: CPT

## 2021-05-11 ENCOUNTER — OUTPATIENT (OUTPATIENT)
Dept: OUTPATIENT SERVICES | Facility: HOSPITAL | Age: 81
LOS: 1 days | End: 2021-05-11

## 2021-05-11 VITALS
HEIGHT: 60 IN | HEART RATE: 79 BPM | WEIGHT: 147.93 LBS | OXYGEN SATURATION: 99 % | DIASTOLIC BLOOD PRESSURE: 78 MMHG | SYSTOLIC BLOOD PRESSURE: 120 MMHG | RESPIRATION RATE: 16 BRPM | TEMPERATURE: 99 F

## 2021-05-11 DIAGNOSIS — I80.229 PHLEBITIS AND THROMBOPHLEBITIS OF UNSPECIFIED POPLITEAL VEIN: ICD-10-CM

## 2021-05-11 DIAGNOSIS — I26.99 OTHER PULMONARY EMBOLISM WITHOUT ACUTE COR PULMONALE: ICD-10-CM

## 2021-05-11 DIAGNOSIS — Z90.49 ACQUIRED ABSENCE OF OTHER SPECIFIED PARTS OF DIGESTIVE TRACT: Chronic | ICD-10-CM

## 2021-05-11 DIAGNOSIS — C85.90 NON-HODGKIN LYMPHOMA, UNSPECIFIED, UNSPECIFIED SITE: ICD-10-CM

## 2021-05-11 DIAGNOSIS — T78.40XA ALLERGY, UNSPECIFIED, INITIAL ENCOUNTER: ICD-10-CM

## 2021-05-11 LAB
APTT BLD: 37.3 SEC — HIGH (ref 27–36.3)
EBV DNA SERPL NAA+PROBE-ACNC: ABNORMAL IU/ML
INR BLD: 1.14 RATIO — SIGNIFICANT CHANGE UP (ref 0.88–1.16)
PROTHROM AB SERPL-ACNC: 12.9 SEC — SIGNIFICANT CHANGE UP (ref 10.6–13.6)

## 2021-05-11 NOTE — ASU PATIENT PROFILE, ADULT - PMH
ANCA-associated vasculitis    Anxiety and depression    DVT, lower extremity    GERD (gastroesophageal reflux disease)    Hyperlipidemia    Lung mass  s/p left wedge resection  Pulmonary embolism

## 2021-05-11 NOTE — H&P PST ADULT - NSICDXPROBLEM_GEN_ALL_CORE_FT
PROBLEM DIAGNOSES  Problem: Non Hodgkin's lymphoma  Assessment and Plan: Patient tentatively scheduled for insertion of infusa port on 5/12/21.  Pre-op instructions provided. Pt given verbal and written instructions with teach back on chlorhexidine shampoo and take her own omeprazole. Pt verbalized understanding with return demonstration.   covid test done result in chart    Problem: Pulmonary embolism  Assessment and Plan: Patient with PE and B/L LE DVT on Eliquis. Stoped Eliquis on her own. Last day 5/8/21. Hematology office (MARGRET Lucas Notified).  Case discussed with Dr Gil. No further evalution at this time.       Problem: Allergy  Assessment and Plan: Patient allergic to PCN and codine . OR booking notified.        PROBLEM DIAGNOSES  Problem: Non Hodgkin's lymphoma  Assessment and Plan: Patient tentatively scheduled for insertion of infusa port on 5/12/21.  Pre-op instructions provided. Pt given verbal and written instructions with teach back on chlorhexidine shampoo and take her own omeprazole. Pt verbalized understanding with return demonstration.   covid test done result in chart    Problem: Pulmonary embolism  Assessment and Plan: Patient with PE and B/L LE DVT on Eliquis. Stoped Eliquis on her own. Last day 5/8/21. Hematology office (MARGRET Lucas Notified- requested to obtain PT, PTT/INR).  Case discussed with Dr Gil. No further evalution at this time.       Problem: Allergy  Assessment and Plan: Patient allergic to PCN and codine . OR booking notified.        PROBLEM DIAGNOSES  Problem: Non Hodgkin's lymphoma  Assessment and Plan: Patient tentatively scheduled for insertion of infusa port on 5/12/21.  Pre-op instructions provided. Pt given verbal and written instructions with teach back on chlorhexidine shampoo and take her own omeprazole. Pt verbalized understanding with return demonstration.   covid test done result in chart    Problem: Pulmonary embolism  Assessment and Plan: Patient with PE and B/L LE DVT on Eliquis. Stoped Eliquis on her own. Last day 5/8/21. Hematology office (MARGRET uLcas Notified- requested to obtain PT, PTT/INR, Patient ok to be off eliquis to restart the day after surgery).  Case discussed with Dr Gil. No further evalution at this time.       Problem: Allergy  Assessment and Plan: Patient allergic to PCN and codine . OR booking notified.

## 2021-05-11 NOTE — H&P PST ADULT - NEGATIVE NEUROLOGICAL SYMPTOMS
no transient paralysis/no weakness/no paresthesias/no generalized seizures/no syncope/no tremors/no difficulty walking

## 2021-05-11 NOTE — H&P PST ADULT - NSANTHOSAYNRD_GEN_A_CORE
No. AKTE screening performed.  STOP BANG Legend: 0-2 = LOW Risk; 3-4 = INTERMEDIATE Risk; 5-8 = HIGH Risk

## 2021-05-11 NOTE — H&P PST ADULT - HISTORY OF PRESENT ILLNESS
80 year old female with PMH of  HDL, depression recently diagnosed with Right PE and B/l LE DVT on Eliquis unspecified presents to Presurgical testing with diagnosis of  Non Hodgkin lymphoma scheduled for insertion of infusa port.     Patient was admitted for 10 days at [facility]  in April 2021( 4/13/21- 4/22/21) for sharp chest pain and shortness of breath. Found to have Right lower and middle lobe PE and b/l LE DVT. Also  found to have left lung mass on CT scan. S/P Left VATs and left lobe wedge resection on 4/16/21.

## 2021-05-11 NOTE — ASU PATIENT PROFILE, ADULT - VISION (WITH CORRECTIVE LENSES IF THE PATIENT USUALLY WEARS THEM):
bilateral hearing aid/Partially impaired: cannot see medication labels or newsprint, but can see obstacles in path, and the surrounding layout; can count fingers at arm's length

## 2021-05-11 NOTE — H&P PST ADULT - NEGATIVE MUSCULOSKELETAL SYMPTOMS
no arthralgia/no arthritis/no joint swelling/no myalgia/no muscle cramps/no muscle weakness/no neck pain/no back pain

## 2021-05-11 NOTE — H&P PST ADULT - NSICDXFAMILYHX_GEN_ALL_CORE_FT
FAMILY HISTORY:  Sibling  Still living? No  FH: CAD (coronary artery disease), Age at diagnosis: Age Unknown  FH: lung cancer, Age at diagnosis: Age Unknown

## 2021-05-11 NOTE — H&P PST ADULT - NEGATIVE ENMT SYMPTOMS
no ear pain/no tinnitus/no vertigo/no sinus symptoms/no nasal congestion/no nasal discharge/no throat pain/no dysphagia

## 2021-05-11 NOTE — H&P PST ADULT - NSICDXPASTMEDICALHX_GEN_ALL_CORE_FT
PAST MEDICAL HISTORY:  ANCA-associated vasculitis     Anxiety and depression     DVT, lower extremity     GERD (gastroesophageal reflux disease)     Hyperlipidemia     Lung mass s/p left wedge resection    Pulmonary embolism

## 2021-05-12 ENCOUNTER — RESULT REVIEW (OUTPATIENT)
Age: 81
End: 2021-05-12

## 2021-05-12 ENCOUNTER — OUTPATIENT (OUTPATIENT)
Dept: OUTPATIENT SERVICES | Facility: HOSPITAL | Age: 81
LOS: 1 days | Discharge: HOME | End: 2021-05-12
Payer: MEDICARE

## 2021-05-12 ENCOUNTER — APPOINTMENT (OUTPATIENT)
Dept: THORACIC SURGERY | Facility: HOSPITAL | Age: 81
End: 2021-05-12

## 2021-05-12 VITALS
DIASTOLIC BLOOD PRESSURE: 66 MMHG | SYSTOLIC BLOOD PRESSURE: 151 MMHG | HEART RATE: 86 BPM | RESPIRATION RATE: 15 BRPM | WEIGHT: 147.93 LBS | TEMPERATURE: 98 F | OXYGEN SATURATION: 99 % | HEIGHT: 60 IN

## 2021-05-12 VITALS
RESPIRATION RATE: 17 BRPM | SYSTOLIC BLOOD PRESSURE: 131 MMHG | DIASTOLIC BLOOD PRESSURE: 50 MMHG | OXYGEN SATURATION: 94 % | HEART RATE: 73 BPM

## 2021-05-12 DIAGNOSIS — C85.90 NON-HODGKIN LYMPHOMA, UNSPECIFIED, UNSPECIFIED SITE: ICD-10-CM

## 2021-05-12 DIAGNOSIS — Z90.49 ACQUIRED ABSENCE OF OTHER SPECIFIED PARTS OF DIGESTIVE TRACT: Chronic | ICD-10-CM

## 2021-05-12 PROCEDURE — 36561 INSERT TUNNELED CV CATH: CPT | Mod: AS,RT

## 2021-05-12 PROCEDURE — 77001 FLUOROGUIDE FOR VEIN DEVICE: CPT | Mod: 26,AS

## 2021-05-12 PROCEDURE — 36561 INSERT TUNNELED CV CATH: CPT | Mod: 79

## 2021-05-12 PROCEDURE — 77001 FLUOROGUIDE FOR VEIN DEVICE: CPT | Mod: 26

## 2021-05-12 PROCEDURE — 71045 X-RAY EXAM CHEST 1 VIEW: CPT | Mod: 26

## 2021-05-12 RX ORDER — FENTANYL CITRATE 50 UG/ML
25 INJECTION INTRAVENOUS
Refills: 0 | Status: DISCONTINUED | OUTPATIENT
Start: 2021-05-12 | End: 2021-05-12

## 2021-05-12 RX ORDER — ONDANSETRON 8 MG/1
4 TABLET, FILM COATED ORAL ONCE
Refills: 0 | Status: DISCONTINUED | OUTPATIENT
Start: 2021-05-12 | End: 2021-05-26

## 2021-05-12 NOTE — BRIEF OPERATIVE NOTE - NSICDXBRIEFPROCEDURE_GEN_ALL_CORE_FT
PROCEDURES:  Imaging guided insertion of central venous cannula 12-May-2021 08:33:13  Cesar Arrington

## 2021-05-12 NOTE — ASU DISCHARGE PLAN (ADULT/PEDIATRIC) - CALL YOUR DOCTOR IF YOU HAVE ANY OF THE FOLLOWING:
Bleeding that does not stop/Swelling that gets worse/Pain not relieved by Medications/Fever greater than (need to indicate Fahrenheit or Celsius)/Wound/Surgical Site with redness, or foul smelling discharge or pus Bleeding that does not stop/Swelling that gets worse/Pain not relieved by Medications/Fever greater than (need to indicate Fahrenheit or Celsius)/Wound/Surgical Site with redness, or foul smelling discharge or pus/Nausea and vomiting that does not stop

## 2021-05-12 NOTE — ASU DISCHARGE PLAN (ADULT/PEDIATRIC) - NURSING INSTRUCTIONS
DO NOT take any Tylenol (Acetaminophen) or narcotics containing Tylenol until after 1.45pm . You received Tylenol during your operation and it can cause damage to your liver if too much is taken within a 24 hour time period.

## 2021-05-12 NOTE — ASU DISCHARGE PLAN (ADULT/PEDIATRIC) - CARE PROVIDER_API CALL
Triston Pettit)  Surgery; Thoracic Surgery  [phone] [place] [address], [place] [address]  Phone: [phone]  Fax: [phone]  Follow Up Time: 2 weeks

## 2021-05-12 NOTE — ASU DISCHARGE PLAN (ADULT/PEDIATRIC) - ASU DC SPECIAL INSTRUCTIONSFT
Restart Eliquis tomorrow morning, 5/13/21    Wipe skin over mediport with alcohol pad prior to accessing mediport

## 2021-05-13 RX ORDER — APIXABAN 2.5 MG/1
1 TABLET, FILM COATED ORAL
Qty: 60 | Refills: 0
Start: 2021-05-13 | End: 2021-06-11

## 2021-05-15 LAB
CULTURE RESULTS: SIGNIFICANT CHANGE UP
SPECIMEN SOURCE: SIGNIFICANT CHANGE UP

## 2021-05-17 ENCOUNTER — INPATIENT (INPATIENT)
Facility: HOSPITAL | Age: 81
LOS: 3 days | Discharge: HOME | End: 2021-05-21
Attending: HOSPITALIST | Admitting: HOSPITALIST
Payer: MEDICARE

## 2021-05-17 VITALS
HEIGHT: 61 IN | OXYGEN SATURATION: 99 % | WEIGHT: 148.37 LBS | SYSTOLIC BLOOD PRESSURE: 153 MMHG | HEART RATE: 79 BPM | RESPIRATION RATE: 17 BRPM | TEMPERATURE: 98 F | DIASTOLIC BLOOD PRESSURE: 66 MMHG

## 2021-05-17 DIAGNOSIS — Z90.49 ACQUIRED ABSENCE OF OTHER SPECIFIED PARTS OF DIGESTIVE TRACT: Chronic | ICD-10-CM

## 2021-05-17 DIAGNOSIS — C83.39 DIFFUSE LARGE B-CELL LYMPHOMA, EXTRANODAL AND SOLID ORGAN SITES: ICD-10-CM

## 2021-05-17 DIAGNOSIS — I27.82 CHRONIC PULMONARY EMBOLISM: ICD-10-CM

## 2021-05-17 DIAGNOSIS — F41.9 ANXIETY DISORDER, UNSPECIFIED: ICD-10-CM

## 2021-05-17 DIAGNOSIS — Z29.9 ENCOUNTER FOR PROPHYLACTIC MEASURES, UNSPECIFIED: ICD-10-CM

## 2021-05-17 PROBLEM — R91.8 OTHER NONSPECIFIC ABNORMAL FINDING OF LUNG FIELD: Chronic | Status: ACTIVE | Noted: 2021-05-11

## 2021-05-17 PROBLEM — K21.9 GASTRO-ESOPHAGEAL REFLUX DISEASE WITHOUT ESOPHAGITIS: Chronic | Status: ACTIVE | Noted: 2021-05-11

## 2021-05-17 PROBLEM — E78.5 HYPERLIPIDEMIA, UNSPECIFIED: Chronic | Status: ACTIVE | Noted: 2021-05-11

## 2021-05-17 LAB
ALBUMIN SERPL ELPH-MCNC: 3.5 G/DL — SIGNIFICANT CHANGE UP (ref 3.3–5)
ALBUMIN SERPL ELPH-MCNC: 3.6 G/DL — SIGNIFICANT CHANGE UP (ref 3.3–5)
ALP SERPL-CCNC: 72 U/L — SIGNIFICANT CHANGE UP (ref 40–120)
ALP SERPL-CCNC: 77 U/L — SIGNIFICANT CHANGE UP (ref 40–120)
ALT FLD-CCNC: 13 U/L — SIGNIFICANT CHANGE UP (ref 4–33)
ALT FLD-CCNC: 15 U/L — SIGNIFICANT CHANGE UP (ref 4–33)
ANION GAP SERPL CALC-SCNC: 12 MMOL/L — SIGNIFICANT CHANGE UP (ref 7–14)
ANION GAP SERPL CALC-SCNC: 16 MMOL/L — HIGH (ref 7–14)
APTT BLD: 37 SEC — HIGH (ref 27–36.3)
AST SERPL-CCNC: 41 U/L — HIGH (ref 4–32)
AST SERPL-CCNC: 43 U/L — HIGH (ref 4–32)
B2 MICROGLOB SERPL-MCNC: 4.9 MG/L — HIGH (ref 0.8–2.2)
BASOPHILS # BLD AUTO: 0.03 K/UL — SIGNIFICANT CHANGE UP (ref 0–0.2)
BASOPHILS NFR BLD AUTO: 0.5 % — SIGNIFICANT CHANGE UP (ref 0–2)
BILIRUB SERPL-MCNC: 0.3 MG/DL — SIGNIFICANT CHANGE UP (ref 0.2–1.2)
BILIRUB SERPL-MCNC: 0.5 MG/DL — SIGNIFICANT CHANGE UP (ref 0.2–1.2)
BLD GP AB SCN SERPL QL: NEGATIVE — SIGNIFICANT CHANGE UP
BUN SERPL-MCNC: 16 MG/DL — SIGNIFICANT CHANGE UP (ref 7–23)
BUN SERPL-MCNC: 17 MG/DL — SIGNIFICANT CHANGE UP (ref 7–23)
CALCIUM SERPL-MCNC: 8.9 MG/DL — SIGNIFICANT CHANGE UP (ref 8.4–10.5)
CALCIUM SERPL-MCNC: 9.2 MG/DL — SIGNIFICANT CHANGE UP (ref 8.4–10.5)
CHLORIDE SERPL-SCNC: 104 MMOL/L — SIGNIFICANT CHANGE UP (ref 98–107)
CHLORIDE SERPL-SCNC: 105 MMOL/L — SIGNIFICANT CHANGE UP (ref 98–107)
CO2 SERPL-SCNC: 16 MMOL/L — LOW (ref 22–31)
CO2 SERPL-SCNC: 20 MMOL/L — LOW (ref 22–31)
COVID-19 SPIKE DOMAIN ANTIBODY INTERPRETATION: POSITIVE
CREAT SERPL-MCNC: 0.68 MG/DL — SIGNIFICANT CHANGE UP (ref 0.5–1.3)
CREAT SERPL-MCNC: 0.84 MG/DL — SIGNIFICANT CHANGE UP (ref 0.5–1.3)
EOSINOPHIL # BLD AUTO: 0.1 K/UL — SIGNIFICANT CHANGE UP (ref 0–0.5)
EOSINOPHIL NFR BLD AUTO: 1.7 % — SIGNIFICANT CHANGE UP (ref 0–6)
GLUCOSE SERPL-MCNC: 248 MG/DL — HIGH (ref 70–99)
GLUCOSE SERPL-MCNC: 92 MG/DL — SIGNIFICANT CHANGE UP (ref 70–99)
HCT VFR BLD CALC: 38.4 % — SIGNIFICANT CHANGE UP (ref 34.5–45)
HGB BLD-MCNC: 12.6 G/DL — SIGNIFICANT CHANGE UP (ref 11.5–15.5)
IANC: 3.28 K/UL — SIGNIFICANT CHANGE UP (ref 1.5–8.5)
IMM GRANULOCYTES NFR BLD AUTO: 0.3 % — SIGNIFICANT CHANGE UP (ref 0–1.5)
INR BLD: 1.84 RATIO — HIGH (ref 0.88–1.16)
LDH SERPL L TO P-CCNC: 458 U/L — HIGH (ref 135–225)
LDH SERPL L TO P-CCNC: 495 U/L — HIGH (ref 135–225)
LYMPHOCYTES # BLD AUTO: 1.8 K/UL — SIGNIFICANT CHANGE UP (ref 1–3.3)
LYMPHOCYTES # BLD AUTO: 30.2 % — SIGNIFICANT CHANGE UP (ref 13–44)
MAGNESIUM SERPL-MCNC: 1.9 MG/DL — SIGNIFICANT CHANGE UP (ref 1.6–2.6)
MAGNESIUM SERPL-MCNC: 2 MG/DL — SIGNIFICANT CHANGE UP (ref 1.6–2.6)
MCHC RBC-ENTMCNC: 32.8 GM/DL — SIGNIFICANT CHANGE UP (ref 32–36)
MCHC RBC-ENTMCNC: 34.2 PG — HIGH (ref 27–34)
MCV RBC AUTO: 104.3 FL — HIGH (ref 80–100)
MONOCYTES # BLD AUTO: 0.73 K/UL — SIGNIFICANT CHANGE UP (ref 0–0.9)
MONOCYTES NFR BLD AUTO: 12.2 % — SIGNIFICANT CHANGE UP (ref 2–14)
NEUTROPHILS # BLD AUTO: 3.28 K/UL — SIGNIFICANT CHANGE UP (ref 1.8–7.4)
NEUTROPHILS NFR BLD AUTO: 55.1 % — SIGNIFICANT CHANGE UP (ref 43–77)
NRBC # BLD: 0 /100 WBCS — SIGNIFICANT CHANGE UP
NRBC # FLD: 0 K/UL — SIGNIFICANT CHANGE UP
PHOSPHATE SERPL-MCNC: 2.5 MG/DL — SIGNIFICANT CHANGE UP (ref 2.5–4.5)
PHOSPHATE SERPL-MCNC: 3 MG/DL — SIGNIFICANT CHANGE UP (ref 2.5–4.5)
PLATELET # BLD AUTO: 234 K/UL — SIGNIFICANT CHANGE UP (ref 150–400)
POTASSIUM SERPL-MCNC: 4.3 MMOL/L — SIGNIFICANT CHANGE UP (ref 3.5–5.3)
POTASSIUM SERPL-MCNC: 4.9 MMOL/L — SIGNIFICANT CHANGE UP (ref 3.5–5.3)
POTASSIUM SERPL-SCNC: 4.3 MMOL/L — SIGNIFICANT CHANGE UP (ref 3.5–5.3)
POTASSIUM SERPL-SCNC: 4.9 MMOL/L — SIGNIFICANT CHANGE UP (ref 3.5–5.3)
PROT SERPL-MCNC: 6.9 G/DL — SIGNIFICANT CHANGE UP (ref 6–8.3)
PROT SERPL-MCNC: 7.3 G/DL — SIGNIFICANT CHANGE UP (ref 6–8.3)
PROTHROM AB SERPL-ACNC: 20.4 SEC — HIGH (ref 10.6–13.6)
RBC # BLD: 3.68 M/UL — LOW (ref 3.8–5.2)
RBC # FLD: 14.2 % — SIGNIFICANT CHANGE UP (ref 10.3–14.5)
RH IG SCN BLD-IMP: POSITIVE — SIGNIFICANT CHANGE UP
SARS-COV-2 AB SERPL IA-ACNC: 3.96 U/ML
SARS-COV-2 N GENE NPH QL NAA+PROBE: NOT DETECTED
SODIUM SERPL-SCNC: 136 MMOL/L — SIGNIFICANT CHANGE UP (ref 135–145)
SODIUM SERPL-SCNC: 137 MMOL/L — SIGNIFICANT CHANGE UP (ref 135–145)
URATE SERPL-MCNC: 4.5 MG/DL — SIGNIFICANT CHANGE UP (ref 2.5–7)
URATE SERPL-MCNC: 5.4 MG/DL — SIGNIFICANT CHANGE UP (ref 2.5–7)
WBC # BLD: 5.96 K/UL — SIGNIFICANT CHANGE UP (ref 3.8–10.5)
WBC # FLD AUTO: 5.96 K/UL — SIGNIFICANT CHANGE UP (ref 3.8–10.5)

## 2021-05-17 PROCEDURE — 99223 1ST HOSP IP/OBS HIGH 75: CPT

## 2021-05-17 PROCEDURE — 74183 MRI ABD W/O CNTR FLWD CNTR: CPT | Mod: 26

## 2021-05-17 PROCEDURE — 99223 1ST HOSP IP/OBS HIGH 75: CPT | Mod: GC

## 2021-05-17 RX ORDER — ACETAMINOPHEN 500 MG
325 TABLET ORAL EVERY 6 HOURS
Refills: 0 | Status: DISCONTINUED | OUTPATIENT
Start: 2021-05-17 | End: 2021-05-21

## 2021-05-17 RX ORDER — EZETIMIBE 10 MG/1
10 TABLET ORAL DAILY
Refills: 0 | Status: DISCONTINUED | OUTPATIENT
Start: 2021-05-17 | End: 2021-05-21

## 2021-05-17 RX ORDER — HEPARIN SODIUM 5000 [USP'U]/ML
2500 INJECTION INTRAVENOUS; SUBCUTANEOUS EVERY 6 HOURS
Refills: 0 | Status: DISCONTINUED | OUTPATIENT
Start: 2021-05-17 | End: 2021-05-20

## 2021-05-17 RX ORDER — LIDOCAINE AND PRILOCAINE CREAM 25; 25 MG/G; MG/G
1 CREAM TOPICAL ONCE
Refills: 0 | Status: COMPLETED | OUTPATIENT
Start: 2021-05-17 | End: 2021-05-17

## 2021-05-17 RX ORDER — CITALOPRAM 10 MG/1
10 TABLET, FILM COATED ORAL DAILY
Refills: 0 | Status: DISCONTINUED | OUTPATIENT
Start: 2021-05-17 | End: 2021-05-21

## 2021-05-17 RX ORDER — PANTOPRAZOLE SODIUM 20 MG/1
40 TABLET, DELAYED RELEASE ORAL
Refills: 0 | Status: DISCONTINUED | OUTPATIENT
Start: 2021-05-17 | End: 2021-05-21

## 2021-05-17 RX ORDER — ONDANSETRON 8 MG/1
4 TABLET, FILM COATED ORAL EVERY 6 HOURS
Refills: 0 | Status: DISCONTINUED | OUTPATIENT
Start: 2021-05-17 | End: 2021-05-21

## 2021-05-17 RX ORDER — SODIUM CHLORIDE 9 MG/ML
1000 INJECTION INTRAMUSCULAR; INTRAVENOUS; SUBCUTANEOUS
Refills: 0 | Status: DISCONTINUED | OUTPATIENT
Start: 2021-05-17 | End: 2021-05-21

## 2021-05-17 RX ORDER — HEPARIN SODIUM 5000 [USP'U]/ML
5500 INJECTION INTRAVENOUS; SUBCUTANEOUS EVERY 6 HOURS
Refills: 0 | Status: DISCONTINUED | OUTPATIENT
Start: 2021-05-17 | End: 2021-05-20

## 2021-05-17 RX ORDER — HEPARIN SODIUM 5000 [USP'U]/ML
INJECTION INTRAVENOUS; SUBCUTANEOUS
Qty: 25000 | Refills: 0 | Status: DISCONTINUED | OUTPATIENT
Start: 2021-05-17 | End: 2021-05-20

## 2021-05-17 RX ADMIN — SODIUM CHLORIDE 75 MILLILITER(S): 9 INJECTION INTRAMUSCULAR; INTRAVENOUS; SUBCUTANEOUS at 11:13

## 2021-05-17 RX ADMIN — LIDOCAINE AND PRILOCAINE CREAM 1 APPLICATION(S): 25; 25 CREAM TOPICAL at 14:23

## 2021-05-17 RX ADMIN — Medication 325 MILLIGRAM(S): at 15:03

## 2021-05-17 RX ADMIN — HEPARIN SODIUM 1200 UNIT(S)/HR: 5000 INJECTION INTRAVENOUS; SUBCUTANEOUS at 20:01

## 2021-05-17 RX ADMIN — Medication 325 MILLIGRAM(S): at 16:00

## 2021-05-17 RX ADMIN — Medication 100 MILLIGRAM(S): at 14:22

## 2021-05-17 NOTE — CONSULT NOTE ADULT - ASSESSMENT
Vincent Novak MD   Hematology/Oncology Fellow   pager [phone]    #EBV+ DLBCL  - start prednisone 100mg daily x 5 days   - ensure patient is on PPI while on steroids   - Plan to start chemotherapy as below:                        - Wednesday 5/19: cytoxan 750mg/m2, doxorubicin 50mg/m2, vincristine 1mg flat dose                        - Thursday 5/20: Rituximab 375mg/m2   - Patient will need fulphila appointment and twice weekly lab appointments day after discharge   - Please arrange for LP for diagnostic and therapeutic LP (plan to treat with IT MTX 12.5mg). CSF flow to be sent   - anticoagulation needs to be held for LP procedure and can be resumed after per neuroradiology   - Please order MRI Abdomen w/ contrast to assess renal and adrenal lesion seen on PET/CT   - check TLS labs q8hr   - gentle IVF hydration given diastolic dysfunction     Vincent Novak MD   Hematology/Oncology Fellow   pager [phone]  80 year old female with history of ANCA vasculitis diagnosed 20 years (has been following with Rheumatology on Azathioprine) presented to [facility] for first cycle of chemotherapy for DLBCL.    #EBV+ DLBCL  - diagnosed 4/13/21 after presenting to the ED with SOB/CP found to have RML RLL PE and b/l LE DVT along with lung masses up to 8.6cm s/p VATS/biopsy confirming EBV+ DLBCL: VEENA+, positive for: CD20 CD79a Pax5 MUM1 Bcl2 CD43 varriable CD30, Negative: CD5 CD10 CD23 Cyclin D1 p53 cmyc, Ki67 >90%.  - Hepatitis panel and HIV negative   - TTE 4/21/21 69%   - s/p chemoport   - PET/CT 1. Status post wedge resection of left upper lobe lung nodule, as compared to CT dated 4/13/2021. Additional FDG-avid, partially necrotic bilateral lung masses and mildly avid groundglass opacities are not significantly changed as compared to prior CT, compatible with biopsy-proven lymphoma. 2. Few mildly FDG-avid, mildly enlarged mediastinal and bilateral hilar lymph nodes are nonspecific. 3. Previously seen indeterminate3.1 cm lesion in the upper pole of right kidney is FDG-avid.  4. FDG-avid left adrenal nodule is indeterminate.   - start prednisone 100mg daily x 5 days   - ensure patient is on PPI while on steroids   - Plan to start chemotherapy as below:                        - Wednesday 5/19: cytoxan 750mg/m2, doxorubicin 50mg/m2, vincristine 1mg flat dose                        - Thursday 5/20: Rituximab 375mg/m2   - Patient will need fulphila appointment and twice weekly lab appointments day after discharge   - Please arrange for LP for diagnostic and therapeutic LP (plan to treat with IT MTX 12.5mg). CSF flow to be sent   - anticoagulation needs to be held for LP procedure and can be resumed after per neuroradiology   - Please order MRI Abdomen w/ contrast to assess renal and adrenal lesion seen on PET/CT   - check TLS labs q8hr   - gentle IVF hydration given diastolic dysfunction     Vincent Novak MD   Hematology/Oncology Fellow   pager [phone]  80 year old female with history of ANCA vasculitis diagnosed 20 years (has been following with Rheumatology on Azathioprine) presented to [facility] for first cycle of chemotherapy for DLBCL.    #EBV+ DLBCL  - diagnosed 4/13/21 after presenting to the ED with SOB/CP found to have RML RLL PE and b/l LE DVT along with lung masses up to 8.6cm s/p VATS/biopsy confirming EBV+ DLBCL: VEENA+, positive for: CD20 CD79a Pax5 MUM1 Bcl2 CD43 varriable CD30, Negative: CD5 CD10 CD23 Cyclin D1 p53 cmyc, Ki67 >90%.  - Hepatitis panel and HIV negative   - TTE 4/21/21 69%   - s/p chemoport   - PET/CT 1. Status post wedge resection of left upper lobe lung nodule, as compared to CT dated 4/13/2021. Additional FDG-avid, partially necrotic bilateral lung masses and mildly avid groundglass opacities are not significantly changed as compared to prior CT, compatible with biopsy-proven lymphoma. 2. Few mildly FDG-avid, mildly enlarged mediastinal and bilateral hilar lymph nodes are nonspecific. 3. Previously seen indeterminate3.1 cm lesion in the upper pole of right kidney is FDG-avid.  4. FDG-avid left adrenal nodule is indeterminate.   - start prednisone 100mg daily x 5 days   - ensure patient is on PPI while on steroids   - start allopurinol 300mg daily   - Plan to start chemotherapy as below:                        - Wednesday 5/19: cytoxan 750mg/m2, doxorubicin 50mg/m2, vincristine 1mg flat dose                        - Thursday 5/20: Rituximab 375mg/m2   - Patient will need fulphila appointment and twice weekly lab appointments day after discharge   - Please arrange for LP for diagnostic and therapeutic LP (plan to treat with IT MTX 12.5mg). CSF flow to be sent   - anticoagulation needs to be held for LP procedure and can be resumed after per neuroradiology   - Please order MRI Abdomen w/ contrast to assess renal and adrenal lesion seen on PET/CT   - check TLS labs q8hr   - gentle IVF hydration given diastolic dysfunction     Vincent Novak MD   Hematology/Oncology Fellow   pager [phone]

## 2021-05-17 NOTE — CONSULT NOTE ADULT - SUBJECTIVE AND OBJECTIVE BOX
HPI:      PAST MEDICAL & SURGICAL HISTORY:  ANCA-associated vasculitis    Anxiety and depression    Pulmonary embolism    DVT, lower extremity    GERD (gastroesophageal reflux disease)    Hyperlipidemia    Lung mass  s/p left wedge resection    History of appendectomy        Allergies    codeine (Nausea)  penicillin (Hives)    Intolerances        MEDICATIONS  (STANDING):    MEDICATIONS  (PRN):      FAMILY HISTORY:  FH: lung cancer (Sibling)    FH: CAD (coronary artery disease) (Sibling)        SOCIAL HISTORY: No EtOH, no tobacco    REVIEW OF SYSTEMS:    CONSTITUTIONAL: No weakness, fevers or chills  EYES/ENT: No visual changes;  No vertigo or throat pain   NECK: No pain or stiffness  RESPIRATORY: No cough, wheezing, hemoptysis; No shortness of breath  CARDIOVASCULAR: No chest pain or palpitations  GASTROINTESTINAL: No abdominal or epigastric pain. No nausea, vomiting, or hematemesis; No diarrhea or constipation. No melena or hematochezia.  GENITOURINARY: No dysuria, frequency or hematuria  NEUROLOGICAL: No numbness or weakness  SKIN: No itching, burning, rashes, or lesions   All other review of systems is negative unless indicated above.    Height (cm): 154.9 (05-17 @ 09:54)  Weight (kg): 67.3 (05-17 @ 09:54)  BMI (kg/m2): 28 (05-17 @ 09:54)  BSA (m2): 1.66 (05-17 @ 09:54)    T(F): 98.3 (05-17-21 @ 09:54), Max: 98.3 (05-17-21 @ 09:54)  HR: 79 (05-17-21 @ 09:54)  BP: 153/66 (05-17-21 @ 09:54)  RR: 17 (05-17-21 @ 09:54)  SpO2: 99% (05-17-21 @ 09:54)  Wt(kg): --    GENERAL: NAD, well-developed  HEAD:  Atraumatic, Normocephalic  EYES: EOMI, PERRLA, conjunctiva and sclera clear  NECK: Supple, No JVD  CHEST/LUNG: Clear to auscultation bilaterally; No wheeze  HEART: Regular rate and rhythm; No murmurs, rubs, or gallops  ABDOMEN: Soft, Nontender, Nondistended; Bowel sounds present  EXTREMITIES:  2+ Peripheral Pulses, No clubbing, cyanosis, or edema  NEUROLOGY: non-focal  SKIN: No rashes or lesions                        .Tissue 1. LEFT UPER LOBE NODULE  04-16 @ 21:17   No growth at 1 week.  --  --      .Tissue 1. LEFT UPER LOBE NODULE  04-16 @ 16:46   No growth at 5 days  --    No polymorphonuclear cells seen per low power field  No organisms seen per oil power field       HPI:  80 year old female with history of ANCA vasculitis diagnosed 20 years (has been following with Rheumatology on Azathioprine) presented to [facility] for first cycle of chemotherapy for DLBCL. She currently has no specific complaints. Overall feels fatigued and that her chemo port feels swollen. The patients history regarding DLBCL is that the patient presented to the ER on 4/13/21 with CP and SOB. A CTA showed a RLL and RML pulmonary emboli and found to have bilateral DVTs which she has been on Eliquis since. During that admission she had a chest tube and weaned off oxygen. At this time      PAST MEDICAL & SURGICAL HISTORY:  ANCA-associated vasculitis    Anxiety and depression    Pulmonary embolism    DVT, lower extremity    GERD (gastroesophageal reflux disease)    Hyperlipidemia    Lung mass  s/p left wedge resection    History of appendectomy        Allergies    codeine (Nausea)  penicillin (Hives)    Intolerances        MEDICATIONS  (STANDING):    MEDICATIONS  (PRN):      FAMILY HISTORY:  FH: lung cancer (Sibling)    FH: CAD (coronary artery disease) (Sibling)        SOCIAL HISTORY: No EtOH, no tobacco    REVIEW OF SYSTEMS:    CONSTITUTIONAL: No weakness, fevers or chills  EYES/ENT: No visual changes;  No vertigo or throat pain   NECK: No pain or stiffness  RESPIRATORY: No cough, wheezing, hemoptysis; No shortness of breath  CARDIOVASCULAR: No chest pain or palpitations  GASTROINTESTINAL: No abdominal or epigastric pain. No nausea, vomiting, or hematemesis; No diarrhea or constipation. No melena or hematochezia.  GENITOURINARY: No dysuria, frequency or hematuria  NEUROLOGICAL: No numbness or weakness  SKIN: No itching, burning, rashes, or lesions   All other review of systems is negative unless indicated above.    Height (cm): 154.9 (05-17 @ 09:54)  Weight (kg): 67.3 (05-17 @ 09:54)  BMI (kg/m2): 28 (05-17 @ 09:54)  BSA (m2): 1.66 (05-17 @ 09:54)    T(F): 98.3 (05-17-21 @ 09:54), Max: 98.3 (05-17-21 @ 09:54)  HR: 79 (05-17-21 @ 09:54)  BP: 153/66 (05-17-21 @ 09:54)  RR: 17 (05-17-21 @ 09:54)  SpO2: 99% (05-17-21 @ 09:54)  Wt(kg): --    GENERAL: NAD, well-developed  HEAD:  Atraumatic, Normocephalic  EYES: EOMI, PERRLA, conjunctiva and sclera clear  NECK: Supple, No JVD  CHEST/LUNG: Clear to auscultation bilaterally; No wheeze  HEART: Regular rate and rhythm; No murmurs, rubs, or gallops  ABDOMEN: Soft, Nontender, Nondistended; Bowel sounds present  EXTREMITIES:  2+ Peripheral Pulses, No clubbing, cyanosis, or edema  NEUROLOGY: non-focal  SKIN: No rashes or lesions                        .Tissue 1. LEFT UPER LOBE NODULE  04-16 @ 21:17   No growth at 1 week.  --  --      .Tissue 1. LEFT UPER LOBE NODULE  04-16 @ 16:46   No growth at 5 days  --    No polymorphonuclear cells seen per low power field  No organisms seen per oil power field       HPI:  80 year old female with history of ANCA vasculitis diagnosed 20 years (has been following with Rheumatology on Azathioprine) presented to [facility] for first cycle of chemotherapy for DLBCL. She currently has no specific complaints. Overall feels fatigued and that her chemo port feels swollen. The patients history regarding DLBCL is that the patient presented to the ER on 4/13/21 with CP and SOB. A CTA showed a RLL and RML pulmonary emboli and found to have bilateral DVTs which she has been on Eliquis since. During that admission she had a chest tube and weaned off oxygen. At this time  also found to have large b/l lung masses measuring up to 8.6cm s/p biopsy/VATS 4/16 with pathology confirming EBV+ DLBCL: VEENA+, positive for: CD20 CD79a Pax5 MUM1 Bcl2 CD43 varriable CD30, Negative: CD5 CD10 CD23 Cyclin D1 p53 cmyc, Ki67 >90%. Her hepaitis panel and HIV was negative. TTE 4/21/21 69%. She had a PET/CT showing lung, hilar and mediastinal DEMAR, right renal and adrenal gland lesion.     PAST MEDICAL & SURGICAL HISTORY:  ANCA-associated vasculitis    Anxiety and depression    Pulmonary embolism    DVT, lower extremity    GERD (gastroesophageal reflux disease)    Hyperlipidemia    Lung mass  s/p left wedge resection    History of appendectomy        Allergies    codeine (Nausea)  penicillin (Hives)    Intolerances        MEDICATIONS  (STANDING):    MEDICATIONS  (PRN):      FAMILY HISTORY:  FH: lung cancer (Sibling)    FH: CAD (coronary artery disease) (Sibling)        SOCIAL HISTORY: No EtOH, no tobacco    REVIEW OF SYSTEMS:    CONSTITUTIONAL: No weakness, fevers or chills  EYES/ENT: No visual changes;  No vertigo or throat pain   NECK: No pain or stiffness  RESPIRATORY: No cough, wheezing, hemoptysis; No shortness of breath  CARDIOVASCULAR: No chest pain or palpitations  GASTROINTESTINAL: No abdominal or epigastric pain. No nausea, vomiting, or hematemesis; No diarrhea or constipation. No melena or hematochezia.  GENITOURINARY: No dysuria, frequency or hematuria  NEUROLOGICAL: No numbness or weakness  SKIN: No itching, burning, rashes, or lesions   All other review of systems is negative unless indicated above.    Height (cm): 154.9 (05-17 @ 09:54)  Weight (kg): 67.3 (05-17 @ 09:54)  BMI (kg/m2): 28 (05-17 @ 09:54)  BSA (m2): 1.66 (05-17 @ 09:54)    T(F): 98.3 (05-17-21 @ 09:54), Max: 98.3 (05-17-21 @ 09:54)  HR: 79 (05-17-21 @ 09:54)  BP: 153/66 (05-17-21 @ 09:54)  RR: 17 (05-17-21 @ 09:54)  SpO2: 99% (05-17-21 @ 09:54)  Wt(kg): --    GENERAL: NAD, well-developed  HEAD:  Atraumatic, Normocephalic  EYES: EOMI, PERRLA, conjunctiva and sclera clear  NECK: Supple, No JVD  CHEST/LUNG: Clear to auscultation bilaterally; No wheeze  HEART: Regular rate and rhythm; No murmurs, rubs, or gallops  ABDOMEN: Soft, Nontender, Nondistended; Bowel sounds present  EXTREMITIES:  2+ Peripheral Pulses, No clubbing, cyanosis, or edema  NEUROLOGY: non-focal  SKIN: No rashes or lesions                        .Tissue 1. LEFT UPER LOBE NODULE  04-16 @ 21:17   No growth at 1 week.  --  --      .Tissue 1. LEFT UPER LOBE NODULE  04-16 @ 16:46   No growth at 5 days  --    No polymorphonuclear cells seen per low power field  No organisms seen per oil power field

## 2021-05-17 NOTE — H&P ADULT - PROBLEM SELECTOR PLAN 2
currently on Eliquis, will hold for now, pending LP  monitor O2. DVT ppx when off AC. currently on Eliquis (last dose 5/17/21 @8AM)  - hold Eliquis, pending LP  monitor O2.   DVT ppx when off AC. currently on Eliquis (last dose 5/17/21 @8AM)  - hold Eliquis for LP. start Hep gtt at 8PM tonight.  monitor O2.

## 2021-05-17 NOTE — H&P ADULT - NSHPREVIEWOFSYSTEMS_GEN_ALL_CORE
CONSTITUTIONAL: No fever; No chills; No night sweats; No weight loss; No fatigue  EYES: No eye pain; No blurry vision  ENMT:  No difficulty hearing; No sinus or throat pain  NECK: No pain or stiffness  RESPIRATORY: No cough; No wheezing; No hemoptysis; No shortness of breath; No sputum production  CARDIOVASCULAR: No chest pain; No palpitations; No leg swelling  GASTROINTESTINAL: No abdominal pain; No nausea; No vomiting; No hematemesis; No diarrhea; No constipation. No melena or hematochezia.  GENITOURINARY: No dysuria; No frequency; No hematuria; No incontinence  NEUROLOGICAL: No headaches; No loss of strength; No numbness  SKIN: No itching/burning; No rashes or lesions   MUSCULOSKELETAL: No joint pain or swelling; No muscle, back, or extremity pain  HEME/LYMPH: No easy bruising, or bleeding gums

## 2021-05-17 NOTE — H&P ADULT - PROBLEM SELECTOR PLAN 1
diagnosed in 4/2021 presented for chemo  There was additional adrenal and renal mass - MRI Abd/Pelvis with contrast for further evaluation.  Concern for CNS involvement - Neuro radiology cs for ITM and CSF sampling.   start gentle hydration - recent echo EF69% Grade 1 diastolic DF. monitor for pulmonary edema.  TLS labs now and q8hrs. - CMP, Mg, Phos, CBC, Uric Acid, LDH  chemo plan per Hematology. diagnosed in 4/2021 presented for chemo  There was additional adrenal and renal mass - MRI Abd/Pelvis with contrast for further evaluation.  Concern for CNS involvement - Neuro radiology cs for ITM and CSF sampling, pending coag.   start gentle hydration - recent echo EF69% Grade 1 diastolic DF. monitor for pulmonary edema.  TLS labs now and q8hrs. - CMP, Mg, Phos, CBC, Uric Acid, LDH  chemo plan per Hematology. diagnosed in 4/2021 presented for chemo  There was additional adrenal and renal mass - MRI Abd/Pelvis with contrast for further evaluation.  Need to r/o CNS involvement - Neuro radiology cs for ITM and CSF sampling,   - patient on Eliquis, per discussion with Neurorad, will need to hold DOAC for 72hrs prior to LP. last dose Eliquis 5/17 @8AM. Call Neurorad on 5/19 for possible LP on 5/20. repeat coag on 5/19  start gentle hydration - recent echo EF69% Grade 1 diastolic DF. monitor for pulmonary edema.  TLS labs now and q8hrs. - CMP, Mg, Phos, CBC, Uric Acid, LDH  chemo plan per Hematology.

## 2021-05-17 NOTE — H&P ADULT - HISTORY OF PRESENT ILLNESS
80 year old female with PMHx of HTN, ANCA vasculitis on Azathioprine, recently diagnosed PE (on Eliquis) and DLBCL with pulmonary involvement found in 4/2021 presented for chemotherapy initiation.      80 year old female with PMHx of HTN, ANCA vasculitis on Azathioprine, recently diagnosed PE (on Eliquis) and DLBCL with pulmonary involvement found in 4/2021 presented for chemotherapy initiation. Patient states she's baseline SOB with overt exertion. no change since last hospitalization. She was prescribed home O2 on last admission, but never used it since she hasn't felt too out of breath. She denied any recent fever, chills, CP, abd pain, n/v/d, dysuria, LE swelling or calf tenderness.      80 year old female with PMHx of HTN, ANCA vasculitis (8yrs ago was on Azathioprine, stopped on last admission due to cancer), recently diagnosed PE (on Eliquis) and DLBCL with pulmonary involvement found in 4/2021 presented for chemotherapy initiation. Patient states she's baseline SOB with overt exertion. no change since last hospitalization. She was prescribed home O2 on last admission, but never used it since she hasn't felt too out of breath. She denied any recent fever, chills, CP, abd pain, n/v/d, dysuria, LE swelling or calf tenderness.

## 2021-05-17 NOTE — H&P ADULT - NSHPPHYSICALEXAM_GEN_ALL_CORE
T(C): 36.8 (05-17-21 @ 09:54), Max: 36.8 (05-17-21 @ 09:54)  HR: 79 (05-17-21 @ 09:54) (79 - 79)  BP: 153/66 (05-17-21 @ 09:54) (153/66 - 153/66)  RR: 17 (05-17-21 @ 09:54) (17 - 17)  SpO2: 99% (05-17-21 @ 09:54) (99% - 99%)    GENERAL: no apparent distress, on room air  HEAD:  Atraumatic, Normocephalic  EYES: EOMI, PERRLA, conjunctiva and sclera clear b/l  NECK: No cervical lymphadenopathy; no obvious masses.   CHEST/LUNG: Clear to auscultation bilaterally; No wheezing or crackles  HEART: Regular rate and rhythm; No obvious murmurs; nl S1/S2; No peripheral edema  ABDOMEN: Soft, Nontender, Nondistended; Bowel sounds present  EXTREMITIES:  2+ Peripheral Pulses; No joint swelling.  PSYCH: normal affect, calm demeanor  NEUROLOGY: Awake and alert; CN 2-12 grossly intact; no obvious FND  SKIN: No rashes or lesions T(C): 36.8 (05-17-21 @ 09:54), Max: 36.8 (05-17-21 @ 09:54)  HR: 79 (05-17-21 @ 09:54) (79 - 79)  BP: 153/66 (05-17-21 @ 09:54) (153/66 - 153/66)  RR: 17 (05-17-21 @ 09:54) (17 - 17)  SpO2: 99% (05-17-21 @ 09:54) (99% - 99%)    GENERAL: no apparent distress, on room air  HEAD:  Atraumatic, Normocephalic  EYES: EOMI, PERRLA, conjunctiva and sclera clear b/l  NECK: No cervical lymphadenopathy; no obvious masses.   CHEST/LUNG: Clear to auscultation bilaterally; No wheezing or crackles  HEART: Regular rate and rhythm; No obvious murmurs; nl S1/S2; No peripheral edema  ABDOMEN: Soft, Nontender, Nondistended; Bowel sounds present  EXTREMITIES:  2+ Peripheral Pulses; No joint swelling.  PSYCH: normal affect, calm demeanor  NEUROLOGY: Awake and alert; CN 2-12 grossly intact; no obvious FND  SKIN: No rashes or lesions; Left upper chest wall with sterile strips and sutures from recent chemoport placement. area is clean and dry. mild post-operative soft tissue swelling, otherwise no collection or indurations. non-tender on palpation.

## 2021-05-17 NOTE — H&P ADULT - NSICDXPASTMEDICALHX_GEN_ALL_CORE_FT
PAST MEDICAL HISTORY:  ANCA-associated vasculitis     Anxiety and depression     DLBCL (diffuse large B cell lymphoma)     DVT, lower extremity     GERD (gastroesophageal reflux disease)     Hyperlipidemia     Lung mass s/p left wedge resection    Pulmonary embolism

## 2021-05-17 NOTE — H&P ADULT - ASSESSMENT
80F with recently diagnosed DLBCL presented for chemotherapy initiation. Require close inpatient monitoring for TLS.

## 2021-05-17 NOTE — H&P ADULT - PROBLEM SELECTOR PLAN 4
DVT: Lovenox HSQ  Diet: Regular, c/w home PPI  Dispo: expect 5 days of hospitalization for chemo. DVT: on therapeutic coagulation.  Diet: Regular, c/w home PPI  Dispo: expect 5 days of hospitalization for chemo.

## 2021-05-18 LAB
ALBUMIN SERPL ELPH-MCNC: 3.3 G/DL — SIGNIFICANT CHANGE UP (ref 3.3–5)
ALBUMIN SERPL ELPH-MCNC: 3.6 G/DL — SIGNIFICANT CHANGE UP (ref 3.3–5)
ALBUMIN SERPL ELPH-MCNC: 3.7 G/DL — SIGNIFICANT CHANGE UP (ref 3.3–5)
ALP SERPL-CCNC: 63 U/L — SIGNIFICANT CHANGE UP (ref 40–120)
ALP SERPL-CCNC: 68 U/L — SIGNIFICANT CHANGE UP (ref 40–120)
ALP SERPL-CCNC: 75 U/L — SIGNIFICANT CHANGE UP (ref 40–120)
ALT FLD-CCNC: 11 U/L — SIGNIFICANT CHANGE UP (ref 4–33)
ALT FLD-CCNC: 12 U/L — SIGNIFICANT CHANGE UP (ref 4–33)
ALT FLD-CCNC: 13 U/L — SIGNIFICANT CHANGE UP (ref 4–33)
ANION GAP SERPL CALC-SCNC: 10 MMOL/L — SIGNIFICANT CHANGE UP (ref 7–14)
ANION GAP SERPL CALC-SCNC: 13 MMOL/L — SIGNIFICANT CHANGE UP (ref 7–14)
ANION GAP SERPL CALC-SCNC: 15 MMOL/L — HIGH (ref 7–14)
APTT BLD: >200 SEC — SIGNIFICANT CHANGE UP (ref 27–36.3)
APTT BLD: >200 SEC — SIGNIFICANT CHANGE UP (ref 27–36.3)
APTT BLD: SIGNIFICANT CHANGE UP SEC (ref 27–36.3)
AST SERPL-CCNC: 24 U/L — SIGNIFICANT CHANGE UP (ref 4–32)
AST SERPL-CCNC: 24 U/L — SIGNIFICANT CHANGE UP (ref 4–32)
AST SERPL-CCNC: 28 U/L — SIGNIFICANT CHANGE UP (ref 4–32)
BASOPHILS # BLD AUTO: 0 K/UL — SIGNIFICANT CHANGE UP (ref 0–0.2)
BASOPHILS NFR BLD AUTO: 0 % — SIGNIFICANT CHANGE UP (ref 0–2)
BILIRUB SERPL-MCNC: 0.3 MG/DL — SIGNIFICANT CHANGE UP (ref 0.2–1.2)
BILIRUB SERPL-MCNC: 0.3 MG/DL — SIGNIFICANT CHANGE UP (ref 0.2–1.2)
BILIRUB SERPL-MCNC: 0.4 MG/DL — SIGNIFICANT CHANGE UP (ref 0.2–1.2)
BUN SERPL-MCNC: 12 MG/DL — SIGNIFICANT CHANGE UP (ref 7–23)
BUN SERPL-MCNC: 13 MG/DL — SIGNIFICANT CHANGE UP (ref 7–23)
BUN SERPL-MCNC: 18 MG/DL — SIGNIFICANT CHANGE UP (ref 7–23)
CALCIUM SERPL-MCNC: 8.8 MG/DL — SIGNIFICANT CHANGE UP (ref 8.4–10.5)
CALCIUM SERPL-MCNC: 9 MG/DL — SIGNIFICANT CHANGE UP (ref 8.4–10.5)
CALCIUM SERPL-MCNC: 9.4 MG/DL — SIGNIFICANT CHANGE UP (ref 8.4–10.5)
CHLORIDE SERPL-SCNC: 106 MMOL/L — SIGNIFICANT CHANGE UP (ref 98–107)
CHLORIDE SERPL-SCNC: 107 MMOL/L — SIGNIFICANT CHANGE UP (ref 98–107)
CHLORIDE SERPL-SCNC: 110 MMOL/L — HIGH (ref 98–107)
CO2 SERPL-SCNC: 19 MMOL/L — LOW (ref 22–31)
CO2 SERPL-SCNC: 20 MMOL/L — LOW (ref 22–31)
CO2 SERPL-SCNC: 21 MMOL/L — LOW (ref 22–31)
CREAT SERPL-MCNC: 0.63 MG/DL — SIGNIFICANT CHANGE UP (ref 0.5–1.3)
CREAT SERPL-MCNC: 0.81 MG/DL — SIGNIFICANT CHANGE UP (ref 0.5–1.3)
CREAT SERPL-MCNC: 0.91 MG/DL — SIGNIFICANT CHANGE UP (ref 0.5–1.3)
EOSINOPHIL # BLD AUTO: 0 K/UL — SIGNIFICANT CHANGE UP (ref 0–0.5)
EOSINOPHIL NFR BLD AUTO: 0 % — SIGNIFICANT CHANGE UP (ref 0–6)
GLUCOSE SERPL-MCNC: 153 MG/DL — HIGH (ref 70–99)
GLUCOSE SERPL-MCNC: 165 MG/DL — HIGH (ref 70–99)
GLUCOSE SERPL-MCNC: 179 MG/DL — HIGH (ref 70–99)
HCT VFR BLD CALC: 35.8 % — SIGNIFICANT CHANGE UP (ref 34.5–45)
HCT VFR BLD CALC: 36.9 % — SIGNIFICANT CHANGE UP (ref 34.5–45)
HGB BLD-MCNC: 11.8 G/DL — SIGNIFICANT CHANGE UP (ref 11.5–15.5)
HGB BLD-MCNC: 12.1 G/DL — SIGNIFICANT CHANGE UP (ref 11.5–15.5)
IANC: 3.49 K/UL — SIGNIFICANT CHANGE UP (ref 1.5–8.5)
IMM GRANULOCYTES NFR BLD AUTO: 0.4 % — SIGNIFICANT CHANGE UP (ref 0–1.5)
LDH SERPL L TO P-CCNC: 276 U/L — HIGH (ref 135–225)
LDH SERPL L TO P-CCNC: 302 U/L — HIGH (ref 135–225)
LDH SERPL L TO P-CCNC: 321 U/L — HIGH (ref 135–225)
LYMPHOCYTES # BLD AUTO: 1.17 K/UL — SIGNIFICANT CHANGE UP (ref 1–3.3)
LYMPHOCYTES # BLD AUTO: 23.9 % — SIGNIFICANT CHANGE UP (ref 13–44)
MAGNESIUM SERPL-MCNC: 2 MG/DL — SIGNIFICANT CHANGE UP (ref 1.6–2.6)
MCHC RBC-ENTMCNC: 32.8 GM/DL — SIGNIFICANT CHANGE UP (ref 32–36)
MCHC RBC-ENTMCNC: 33 GM/DL — SIGNIFICANT CHANGE UP (ref 32–36)
MCHC RBC-ENTMCNC: 33.9 PG — SIGNIFICANT CHANGE UP (ref 27–34)
MCHC RBC-ENTMCNC: 33.9 PG — SIGNIFICANT CHANGE UP (ref 27–34)
MCV RBC AUTO: 102.9 FL — HIGH (ref 80–100)
MCV RBC AUTO: 103.4 FL — HIGH (ref 80–100)
MONOCYTES # BLD AUTO: 0.21 K/UL — SIGNIFICANT CHANGE UP (ref 0–0.9)
MONOCYTES NFR BLD AUTO: 4.3 % — SIGNIFICANT CHANGE UP (ref 2–14)
NEUTROPHILS # BLD AUTO: 3.49 K/UL — SIGNIFICANT CHANGE UP (ref 1.8–7.4)
NEUTROPHILS NFR BLD AUTO: 71.4 % — SIGNIFICANT CHANGE UP (ref 43–77)
NRBC # BLD: 0 /100 WBCS — SIGNIFICANT CHANGE UP
NRBC # BLD: 0 /100 WBCS — SIGNIFICANT CHANGE UP
NRBC # FLD: 0 K/UL — SIGNIFICANT CHANGE UP
NRBC # FLD: 0 K/UL — SIGNIFICANT CHANGE UP
PHOSPHATE SERPL-MCNC: 2.5 MG/DL — SIGNIFICANT CHANGE UP (ref 2.5–4.5)
PLATELET # BLD AUTO: 223 K/UL — SIGNIFICANT CHANGE UP (ref 150–400)
PLATELET # BLD AUTO: 227 K/UL — SIGNIFICANT CHANGE UP (ref 150–400)
POTASSIUM SERPL-MCNC: 3.8 MMOL/L — SIGNIFICANT CHANGE UP (ref 3.5–5.3)
POTASSIUM SERPL-MCNC: 3.9 MMOL/L — SIGNIFICANT CHANGE UP (ref 3.5–5.3)
POTASSIUM SERPL-MCNC: 4 MMOL/L — SIGNIFICANT CHANGE UP (ref 3.5–5.3)
POTASSIUM SERPL-SCNC: 3.8 MMOL/L — SIGNIFICANT CHANGE UP (ref 3.5–5.3)
POTASSIUM SERPL-SCNC: 3.9 MMOL/L — SIGNIFICANT CHANGE UP (ref 3.5–5.3)
POTASSIUM SERPL-SCNC: 4 MMOL/L — SIGNIFICANT CHANGE UP (ref 3.5–5.3)
PROT SERPL-MCNC: 6.6 G/DL — SIGNIFICANT CHANGE UP (ref 6–8.3)
PROT SERPL-MCNC: 7 G/DL — SIGNIFICANT CHANGE UP (ref 6–8.3)
PROT SERPL-MCNC: 7.2 G/DL — SIGNIFICANT CHANGE UP (ref 6–8.3)
RBC # BLD: 3.48 M/UL — LOW (ref 3.8–5.2)
RBC # BLD: 3.57 M/UL — LOW (ref 3.8–5.2)
RBC # FLD: 14 % — SIGNIFICANT CHANGE UP (ref 10.3–14.5)
RBC # FLD: 14.1 % — SIGNIFICANT CHANGE UP (ref 10.3–14.5)
SODIUM SERPL-SCNC: 139 MMOL/L — SIGNIFICANT CHANGE UP (ref 135–145)
SODIUM SERPL-SCNC: 141 MMOL/L — SIGNIFICANT CHANGE UP (ref 135–145)
SODIUM SERPL-SCNC: 141 MMOL/L — SIGNIFICANT CHANGE UP (ref 135–145)
URATE SERPL-MCNC: 3.2 MG/DL — SIGNIFICANT CHANGE UP (ref 2.5–7)
URATE SERPL-MCNC: 4.3 MG/DL — SIGNIFICANT CHANGE UP (ref 2.5–7)
WBC # BLD: 2.78 K/UL — LOW (ref 3.8–10.5)
WBC # BLD: 4.89 K/UL — SIGNIFICANT CHANGE UP (ref 3.8–10.5)
WBC # FLD AUTO: 2.78 K/UL — LOW (ref 3.8–10.5)
WBC # FLD AUTO: 4.89 K/UL — SIGNIFICANT CHANGE UP (ref 3.8–10.5)

## 2021-05-18 PROCEDURE — 99233 SBSQ HOSP IP/OBS HIGH 50: CPT | Mod: GC

## 2021-05-18 RX ORDER — PALONOSETRON HYDROCHLORIDE 0.25 MG/5ML
0.25 INJECTION, SOLUTION INTRAVENOUS ONCE
Refills: 0 | Status: COMPLETED | OUTPATIENT
Start: 2021-05-19 | End: 2021-05-19

## 2021-05-18 RX ORDER — METOCLOPRAMIDE HCL 10 MG
10 TABLET ORAL EVERY 8 HOURS
Refills: 0 | Status: DISCONTINUED | OUTPATIENT
Start: 2021-05-19 | End: 2021-05-21

## 2021-05-18 RX ORDER — CHLORHEXIDINE GLUCONATE 213 G/1000ML
1 SOLUTION TOPICAL DAILY
Refills: 0 | Status: DISCONTINUED | OUTPATIENT
Start: 2021-05-18 | End: 2021-05-21

## 2021-05-18 RX ORDER — ALLOPURINOL 300 MG
300 TABLET ORAL DAILY
Refills: 0 | Status: DISCONTINUED | OUTPATIENT
Start: 2021-05-18 | End: 2021-05-21

## 2021-05-18 RX ORDER — DIPHENHYDRAMINE HCL 50 MG
50 CAPSULE ORAL ONCE
Refills: 0 | Status: DISCONTINUED | OUTPATIENT
Start: 2021-05-20 | End: 2021-05-21

## 2021-05-18 RX ORDER — RITUXIMAB 10 MG/ML
622 INJECTION, SOLUTION INTRAVENOUS ONCE
Refills: 0 | Status: COMPLETED | OUTPATIENT
Start: 2021-05-20 | End: 2021-05-20

## 2021-05-18 RX ORDER — VINCRISTINE SULFATE 1 MG/ML
1 VIAL (ML) INTRAVENOUS ONCE
Refills: 0 | Status: COMPLETED | OUTPATIENT
Start: 2021-05-19 | End: 2021-05-19

## 2021-05-18 RX ORDER — DIPHENHYDRAMINE HCL 50 MG
50 CAPSULE ORAL ONCE
Refills: 0 | Status: COMPLETED | OUTPATIENT
Start: 2021-05-20 | End: 2021-05-20

## 2021-05-18 RX ORDER — CYCLOPHOSPHAMIDE 100 MG
1245 VIAL (EA) INTRAVENOUS ONCE
Refills: 0 | Status: COMPLETED | OUTPATIENT
Start: 2021-05-19 | End: 2021-05-19

## 2021-05-18 RX ORDER — VALACYCLOVIR 500 MG/1
1000 TABLET, FILM COATED ORAL
Refills: 0 | Status: DISCONTINUED | OUTPATIENT
Start: 2021-05-18 | End: 2021-05-21

## 2021-05-18 RX ORDER — DOXORUBICIN HYDROCHLORIDE 2 MG/ML
83 INJECTION, SOLUTION INTRAVENOUS ONCE
Refills: 0 | Status: COMPLETED | OUTPATIENT
Start: 2021-05-19 | End: 2021-05-19

## 2021-05-18 RX ORDER — FOSAPREPITANT DIMEGLUMINE 150 MG/5ML
150 INJECTION, POWDER, LYOPHILIZED, FOR SOLUTION INTRAVENOUS ONCE
Refills: 0 | Status: COMPLETED | OUTPATIENT
Start: 2021-05-19 | End: 2021-05-19

## 2021-05-18 RX ORDER — ACETAMINOPHEN 500 MG
650 TABLET ORAL ONCE
Refills: 0 | Status: COMPLETED | OUTPATIENT
Start: 2021-05-20 | End: 2021-05-20

## 2021-05-18 RX ORDER — HYDROCORTISONE 20 MG
50 TABLET ORAL ONCE
Refills: 0 | Status: DISCONTINUED | OUTPATIENT
Start: 2021-05-20 | End: 2021-05-21

## 2021-05-18 RX ADMIN — SODIUM CHLORIDE 75 MILLILITER(S): 9 INJECTION INTRAMUSCULAR; INTRAVENOUS; SUBCUTANEOUS at 06:46

## 2021-05-18 RX ADMIN — HEPARIN SODIUM 800 UNIT(S)/HR: 5000 INJECTION INTRAVENOUS; SUBCUTANEOUS at 13:47

## 2021-05-18 RX ADMIN — Medication 100 MILLIGRAM(S): at 06:45

## 2021-05-18 RX ADMIN — SODIUM CHLORIDE 75 MILLILITER(S): 9 INJECTION INTRAMUSCULAR; INTRAVENOUS; SUBCUTANEOUS at 00:25

## 2021-05-18 RX ADMIN — Medication 300 MILLIGRAM(S): at 12:29

## 2021-05-18 RX ADMIN — HEPARIN SODIUM 0 UNIT(S)/HR: 5000 INJECTION INTRAVENOUS; SUBCUTANEOUS at 12:30

## 2021-05-18 RX ADMIN — HEPARIN SODIUM 1000 UNIT(S)/HR: 5000 INJECTION INTRAVENOUS; SUBCUTANEOUS at 03:42

## 2021-05-18 RX ADMIN — HEPARIN SODIUM 0 UNIT(S)/HR: 5000 INJECTION INTRAVENOUS; SUBCUTANEOUS at 02:39

## 2021-05-18 RX ADMIN — HEPARIN SODIUM 600 UNIT(S)/HR: 5000 INJECTION INTRAVENOUS; SUBCUTANEOUS at 21:48

## 2021-05-18 RX ADMIN — PANTOPRAZOLE SODIUM 40 MILLIGRAM(S): 20 TABLET, DELAYED RELEASE ORAL at 06:45

## 2021-05-18 RX ADMIN — CHLORHEXIDINE GLUCONATE 1 APPLICATION(S): 213 SOLUTION TOPICAL at 12:34

## 2021-05-18 RX ADMIN — EZETIMIBE 10 MILLIGRAM(S): 10 TABLET ORAL at 12:30

## 2021-05-18 RX ADMIN — HEPARIN SODIUM 0 UNIT(S)/HR: 5000 INJECTION INTRAVENOUS; SUBCUTANEOUS at 20:43

## 2021-05-18 RX ADMIN — VALACYCLOVIR 1000 MILLIGRAM(S): 500 TABLET, FILM COATED ORAL at 18:47

## 2021-05-18 RX ADMIN — CITALOPRAM 10 MILLIGRAM(S): 10 TABLET, FILM COATED ORAL at 12:30

## 2021-05-18 NOTE — PROGRESS NOTE ADULT - ASSESSMENT
80 year old female with history of ANCA vasculitis diagnosed 20 years (has been following with Rheumatology on Azathioprine) presented to [facility] for first cycle of chemotherapy for DLBCL.    #EBV+ DLBCL  - diagnosed 4/13/21 after presenting to the ED with SOB/CP found to have RML RLL PE and b/l LE DVT along with lung masses up to 8.6cm s/p VATS/biopsy confirming EBV+ DLBCL: VEENA+, positive for: CD20 CD79a Pax5 MUM1 Bcl2 CD43 varriable CD30, Negative: CD5 CD10 CD23 Cyclin D1 p53 cmyc, Ki67 >90%.  - Hepatitis panel and HIV negative   - TTE 4/21/21 69%   - s/p chemoport   - PET/CT 1. Status post wedge resection of left upper lobe lung nodule, as compared to CT dated 4/13/2021. Additional FDG-avid, partially necrotic bilateral lung masses and mildly avid groundglass opacities are not significantly changed as compared to prior CT, compatible with biopsy-proven lymphoma. 2. Few mildly FDG-avid, mildly enlarged mediastinal and bilateral hilar lymph nodes are nonspecific. 3. Previously seen indeterminate3.1 cm lesion in the upper pole of right kidney is FDG-avid.  4. FDG-avid left adrenal nodule is indeterminate.   - start prednisone 100mg daily x 5 days (Day 2/5)   - ensure patient is on PPI while on steroids   - start allopurinol 300mg daily   - Plan to start chemotherapy as below:                        - Wednesday 5/19: cytoxan 750mg/m2, doxorubicin 50mg/m2, vincristine 1mg flat dose                        - Thursday 5/20: Rituximab 375mg/m2   - Patient will need fulphila appointment and twice weekly lab appointments day after discharge   - Please arrange for LP for diagnostic and therapeutic LP (plan to treat with IT MTX 12mg). CSF flow to be sent: Tentatively planned for 5/20/21. If possible please arrange to be done in AM prior to the start of Rituximab to prevent interruption of infusion.   - anticoagulation needs to be held for LP procedure and can be resumed after per neuroradiology   - MRI Abdomen result noted (right renal lesion decreased in size and left adrenal lesion remains indeterminate)   - check TLS labs q8hr   - gentle IVF hydration given diastolic dysfunction     Vincent Novak MD   Hematology/Oncology Fellow   pager [phone]

## 2021-05-18 NOTE — CHART NOTE - NSCHARTNOTEFT_GEN_A_CORE
Pre-Interventional Radiology Procedure Note    80y    Female    Procedure: Diagnostic / Theraputic LP with IT MTX on 5/20/21    Diagnosis/Indication: Patient is a 80y old  Female who presents with a chief complaint of chemo (18 May 2021 15:05)      Interventional Radiology Attending Physician: Dr. Scott    Ordering Attending Physician: Dr. Arias     PAST MEDICAL & SURGICAL HISTORY:  ANCA-associated vasculitis    Anxiety and depression    Pulmonary embolism    DVT, lower extremity    GERD (gastroesophageal reflux disease)    Hyperlipidemia    Lung mass  s/p left wedge resection    DLBCL (diffuse large B cell lymphoma)    History of appendectomy         CBC Full  -  ( 18 May 2021 05:02 )  WBC Count : 4.89 K/uL  RBC Count : 3.57 M/uL  Hemoglobin : 12.1 g/dL  Hematocrit : 36.9 %  Platelet Count - Automated : 227 K/uL  Mean Cell Volume : 103.4 fL  Mean Cell Hemoglobin : 33.9 pg  Mean Cell Hemoglobin Concentration : 32.8 gm/dL  Auto Neutrophil # : 3.49 K/uL  Auto Lymphocyte # : 1.17 K/uL  Auto Monocyte # : 0.21 K/uL  Auto Eosinophil # : 0.00 K/uL  Auto Basophil # : 0.00 K/uL  Auto Neutrophil % : 71.4 %  Auto Lymphocyte % : 23.9 %  Auto Monocyte % : 4.3 %  Auto Eosinophil % : 0.0 %  Auto Basophil % : 0.0 %    05-18    141  |  106  |  13  ----------------------------<  179<H>  3.9   |  20<L>  |  0.81    Ca    9.4      18 May 2021 14:35  Phos  2.5     05-18  Mg     2.0     05-18    TPro  7.0  /  Alb  3.6  /  TBili  0.4  /  DBili  x   /  AST  24  /  ALT  13  /  AlkPhos  68  05-18    PT/INR - ( 17 May 2021 11:32 )   PT: 20.4 sec;   INR: 1.84 ratio         PTT - ( 18 May 2021 11:39 )  PTT:>200 sec

## 2021-05-19 PROBLEM — C83.30 DIFFUSE LARGE B-CELL LYMPHOMA, UNSPECIFIED SITE: Chronic | Status: ACTIVE | Noted: 2021-05-17

## 2021-05-19 LAB
ALBUMIN SERPL ELPH-MCNC: 3.3 G/DL — SIGNIFICANT CHANGE UP (ref 3.3–5)
ALBUMIN SERPL ELPH-MCNC: 3.4 G/DL — SIGNIFICANT CHANGE UP (ref 3.3–5)
ALBUMIN SERPL ELPH-MCNC: 3.4 G/DL — SIGNIFICANT CHANGE UP (ref 3.3–5)
ALP SERPL-CCNC: 62 U/L — SIGNIFICANT CHANGE UP (ref 40–120)
ALP SERPL-CCNC: 62 U/L — SIGNIFICANT CHANGE UP (ref 40–120)
ALP SERPL-CCNC: 64 U/L — SIGNIFICANT CHANGE UP (ref 40–120)
ALT FLD-CCNC: 12 U/L — SIGNIFICANT CHANGE UP (ref 4–33)
ALT FLD-CCNC: 14 U/L — SIGNIFICANT CHANGE UP (ref 4–33)
ALT FLD-CCNC: 15 U/L — SIGNIFICANT CHANGE UP (ref 4–33)
ANION GAP SERPL CALC-SCNC: 12 MMOL/L — SIGNIFICANT CHANGE UP (ref 7–14)
ANION GAP SERPL CALC-SCNC: 12 MMOL/L — SIGNIFICANT CHANGE UP (ref 7–14)
ANION GAP SERPL CALC-SCNC: 14 MMOL/L — SIGNIFICANT CHANGE UP (ref 7–14)
APTT BLD: 148.8 SEC — SIGNIFICANT CHANGE UP (ref 27–36.3)
APTT BLD: 71.4 SEC — HIGH (ref 27–36.3)
APTT BLD: 89.4 SEC — HIGH (ref 27–36.3)
AST SERPL-CCNC: 24 U/L — SIGNIFICANT CHANGE UP (ref 4–32)
AST SERPL-CCNC: 26 U/L — SIGNIFICANT CHANGE UP (ref 4–32)
AST SERPL-CCNC: 27 U/L — SIGNIFICANT CHANGE UP (ref 4–32)
BILIRUB SERPL-MCNC: 0.3 MG/DL — SIGNIFICANT CHANGE UP (ref 0.2–1.2)
BUN SERPL-MCNC: 16 MG/DL — SIGNIFICANT CHANGE UP (ref 7–23)
BUN SERPL-MCNC: 17 MG/DL — SIGNIFICANT CHANGE UP (ref 7–23)
BUN SERPL-MCNC: 18 MG/DL — SIGNIFICANT CHANGE UP (ref 7–23)
CALCIUM SERPL-MCNC: 8.3 MG/DL — LOW (ref 8.4–10.5)
CALCIUM SERPL-MCNC: 8.5 MG/DL — SIGNIFICANT CHANGE UP (ref 8.4–10.5)
CALCIUM SERPL-MCNC: 8.7 MG/DL — SIGNIFICANT CHANGE UP (ref 8.4–10.5)
CHLORIDE SERPL-SCNC: 108 MMOL/L — HIGH (ref 98–107)
CHLORIDE SERPL-SCNC: 109 MMOL/L — HIGH (ref 98–107)
CHLORIDE SERPL-SCNC: 109 MMOL/L — HIGH (ref 98–107)
CO2 SERPL-SCNC: 19 MMOL/L — LOW (ref 22–31)
CO2 SERPL-SCNC: 20 MMOL/L — LOW (ref 22–31)
CO2 SERPL-SCNC: 20 MMOL/L — LOW (ref 22–31)
CREAT SERPL-MCNC: 0.67 MG/DL — SIGNIFICANT CHANGE UP (ref 0.5–1.3)
CREAT SERPL-MCNC: 0.75 MG/DL — SIGNIFICANT CHANGE UP (ref 0.5–1.3)
CREAT SERPL-MCNC: 0.82 MG/DL — SIGNIFICANT CHANGE UP (ref 0.5–1.3)
GLUCOSE SERPL-MCNC: 107 MG/DL — HIGH (ref 70–99)
GLUCOSE SERPL-MCNC: 156 MG/DL — HIGH (ref 70–99)
GLUCOSE SERPL-MCNC: 160 MG/DL — HIGH (ref 70–99)
HCT VFR BLD CALC: 35.4 % — SIGNIFICANT CHANGE UP (ref 34.5–45)
HGB BLD-MCNC: 11.4 G/DL — LOW (ref 11.5–15.5)
LDH SERPL L TO P-CCNC: 250 U/L — HIGH (ref 135–225)
LDH SERPL L TO P-CCNC: 262 U/L — HIGH (ref 135–225)
LDH SERPL L TO P-CCNC: 279 U/L — HIGH (ref 135–225)
MAGNESIUM SERPL-MCNC: 2 MG/DL — SIGNIFICANT CHANGE UP (ref 1.6–2.6)
MAGNESIUM SERPL-MCNC: 2.1 MG/DL — SIGNIFICANT CHANGE UP (ref 1.6–2.6)
MAGNESIUM SERPL-MCNC: 2.2 MG/DL — SIGNIFICANT CHANGE UP (ref 1.6–2.6)
MCHC RBC-ENTMCNC: 32.2 GM/DL — SIGNIFICANT CHANGE UP (ref 32–36)
MCHC RBC-ENTMCNC: 33.6 PG — SIGNIFICANT CHANGE UP (ref 27–34)
MCV RBC AUTO: 104.4 FL — HIGH (ref 80–100)
NRBC # BLD: 0 /100 WBCS — SIGNIFICANT CHANGE UP
NRBC # FLD: 0.02 K/UL — HIGH
PHOSPHATE SERPL-MCNC: 2 MG/DL — LOW (ref 2.5–4.5)
PHOSPHATE SERPL-MCNC: 2.3 MG/DL — LOW (ref 2.5–4.5)
PHOSPHATE SERPL-MCNC: 2.3 MG/DL — LOW (ref 2.5–4.5)
PLATELET # BLD AUTO: 253 K/UL — SIGNIFICANT CHANGE UP (ref 150–400)
POTASSIUM SERPL-MCNC: 3.4 MMOL/L — LOW (ref 3.5–5.3)
POTASSIUM SERPL-MCNC: 3.6 MMOL/L — SIGNIFICANT CHANGE UP (ref 3.5–5.3)
POTASSIUM SERPL-MCNC: 3.7 MMOL/L — SIGNIFICANT CHANGE UP (ref 3.5–5.3)
POTASSIUM SERPL-SCNC: 3.4 MMOL/L — LOW (ref 3.5–5.3)
POTASSIUM SERPL-SCNC: 3.6 MMOL/L — SIGNIFICANT CHANGE UP (ref 3.5–5.3)
POTASSIUM SERPL-SCNC: 3.7 MMOL/L — SIGNIFICANT CHANGE UP (ref 3.5–5.3)
PROT SERPL-MCNC: 6.2 G/DL — SIGNIFICANT CHANGE UP (ref 6–8.3)
PROT SERPL-MCNC: 6.4 G/DL — SIGNIFICANT CHANGE UP (ref 6–8.3)
PROT SERPL-MCNC: 6.6 G/DL — SIGNIFICANT CHANGE UP (ref 6–8.3)
RBC # BLD: 3.39 M/UL — LOW (ref 3.8–5.2)
RBC # FLD: 14.4 % — SIGNIFICANT CHANGE UP (ref 10.3–14.5)
SODIUM SERPL-SCNC: 140 MMOL/L — SIGNIFICANT CHANGE UP (ref 135–145)
SODIUM SERPL-SCNC: 141 MMOL/L — SIGNIFICANT CHANGE UP (ref 135–145)
SODIUM SERPL-SCNC: 142 MMOL/L — SIGNIFICANT CHANGE UP (ref 135–145)
URATE SERPL-MCNC: 2.7 MG/DL — SIGNIFICANT CHANGE UP (ref 2.5–7)
URATE SERPL-MCNC: 2.9 MG/DL — SIGNIFICANT CHANGE UP (ref 2.5–7)
URATE SERPL-MCNC: 3.2 MG/DL — SIGNIFICANT CHANGE UP (ref 2.5–7)
WBC # BLD: 13.14 K/UL — HIGH (ref 3.8–10.5)
WBC # FLD AUTO: 13.14 K/UL — HIGH (ref 3.8–10.5)

## 2021-05-19 PROCEDURE — 99233 SBSQ HOSP IP/OBS HIGH 50: CPT | Mod: GC

## 2021-05-19 RX ORDER — METHOTREXATE 2.5 MG/1
12 TABLET ORAL ONCE
Refills: 0 | Status: DISCONTINUED | OUTPATIENT
Start: 2021-05-20 | End: 2021-05-20

## 2021-05-19 RX ORDER — LIDOCAINE AND PRILOCAINE CREAM 25; 25 MG/G; MG/G
1 CREAM TOPICAL ONCE
Refills: 0 | Status: COMPLETED | OUTPATIENT
Start: 2021-05-19 | End: 2021-05-19

## 2021-05-19 RX ADMIN — PALONOSETRON HYDROCHLORIDE 0.25 MILLIGRAM(S): 0.25 INJECTION, SOLUTION INTRAVENOUS at 13:59

## 2021-05-19 RX ADMIN — Medication 250 MILLIGRAM(S): at 15:52

## 2021-05-19 RX ADMIN — FOSAPREPITANT DIMEGLUMINE 465 MILLIGRAM(S): 150 INJECTION, POWDER, LYOPHILIZED, FOR SOLUTION INTRAVENOUS at 13:59

## 2021-05-19 RX ADMIN — VALACYCLOVIR 1000 MILLIGRAM(S): 500 TABLET, FILM COATED ORAL at 05:45

## 2021-05-19 RX ADMIN — VALACYCLOVIR 1000 MILLIGRAM(S): 500 TABLET, FILM COATED ORAL at 17:45

## 2021-05-19 RX ADMIN — PANTOPRAZOLE SODIUM 40 MILLIGRAM(S): 20 TABLET, DELAYED RELEASE ORAL at 05:45

## 2021-05-19 RX ADMIN — Medication 300 MILLIGRAM(S): at 12:25

## 2021-05-19 RX ADMIN — DOXORUBICIN HYDROCHLORIDE 249 MILLIGRAM(S): 2 INJECTION, SOLUTION INTRAVENOUS at 15:04

## 2021-05-19 RX ADMIN — HEPARIN SODIUM 0 UNIT(S)/HR: 5000 INJECTION INTRAVENOUS; SUBCUTANEOUS at 07:37

## 2021-05-19 RX ADMIN — CITALOPRAM 10 MILLIGRAM(S): 10 TABLET, FILM COATED ORAL at 12:25

## 2021-05-19 RX ADMIN — HEPARIN SODIUM 400 UNIT(S)/HR: 5000 INJECTION INTRAVENOUS; SUBCUTANEOUS at 22:48

## 2021-05-19 RX ADMIN — EZETIMIBE 10 MILLIGRAM(S): 10 TABLET ORAL at 05:48

## 2021-05-19 RX ADMIN — Medication 100 MILLIGRAM(S): at 05:45

## 2021-05-19 RX ADMIN — Medication 612 MILLIGRAM(S): at 15:37

## 2021-05-19 RX ADMIN — HEPARIN SODIUM 400 UNIT(S)/HR: 5000 INJECTION INTRAVENOUS; SUBCUTANEOUS at 08:45

## 2021-05-19 RX ADMIN — LIDOCAINE AND PRILOCAINE CREAM 1 APPLICATION(S): 25; 25 CREAM TOPICAL at 13:00

## 2021-05-19 RX ADMIN — CHLORHEXIDINE GLUCONATE 1 APPLICATION(S): 213 SOLUTION TOPICAL at 13:46

## 2021-05-19 NOTE — PROGRESS NOTE ADULT - ASSESSMENT
80 year old female with history of ANCA vasculitis diagnosed 20 years (has been following with Rheumatology on Azathioprine) presented to [facility] for first cycle of chemotherapy for DLBCL.    #EBV+ DLBCL  - diagnosed 4/13/21 after presenting to the ED with SOB/CP found to have RML RLL PE and b/l LE DVT along with lung masses up to 8.6cm s/p VATS/biopsy confirming EBV+ DLBCL: VEENA+, positive for: CD20 CD79a Pax5 MUM1 Bcl2 CD43 varriable CD30, Negative: CD5 CD10 CD23 Cyclin D1 p53 cmyc, Ki67 >90%.  - Hepatitis panel and HIV negative   - TTE 4/21/21 69%   - s/p chemoport   - PET/CT 1. Status post wedge resection of left upper lobe lung nodule, as compared to CT dated 4/13/2021. Additional FDG-avid, partially necrotic bilateral lung masses and mildly avid groundglass opacities are not significantly changed as compared to prior CT, compatible with biopsy-proven lymphoma. 2. Few mildly FDG-avid, mildly enlarged mediastinal and bilateral hilar lymph nodes are nonspecific. 3. Previously seen indeterminate3.1 cm lesion in the upper pole of right kidney is FDG-avid.  4. FDG-avid left adrenal nodule is indeterminate.   - start prednisone 100mg daily x 5 days (Day 2/5)   - ensure patient is on PPI while on steroids   - start allopurinol 300mg daily   - Plan to start chemotherapy as below:                        - Wednesday 5/19: cytoxan 750mg/m2, doxorubicin 50mg/m2, vincristine 1mg flat dose                        - Thursday 5/20: Rituximab 375mg/m2   - Patient will need fulphila appointment and twice weekly lab appointments day after discharge. Sent an email to schedule. Will update primary team with appointments when secured.   - Please arrange for LP for diagnostic and therapeutic LP (plan to treat with IT MTX 12mg). CSF flow to be sent: Planned for 5/20/21 AM and then will receive Rituximab after.   - anticoagulation needs to be held for LP procedure and can be resumed after per neuroradiology   - MRI Abdomen result noted (right renal lesion decreased in size and left adrenal lesion remains indeterminate)   - check TLS labs q8hr   - gentle IVF hydration given diastolic dysfunction     Vincent Novak MD   Hematology/Oncology Fellow   pager [phone]  80 year old female with history of ANCA vasculitis diagnosed 20 years (has been following with Rheumatology on Azathioprine) presented to [facility] for first cycle of chemotherapy for DLBCL.    #EBV+ DLBCL  - diagnosed 4/13/21 after presenting to the ED with SOB/CP found to have RML RLL PE and b/l LE DVT along with lung masses up to 8.6cm s/p VATS/biopsy confirming EBV+ DLBCL: VEENA+, positive for: CD20 CD79a Pax5 MUM1 Bcl2 CD43 varriable CD30, Negative: CD5 CD10 CD23 Cyclin D1 p53 cmyc, Ki67 >90%.  - Hepatitis panel and HIV negative   - TTE 4/21/21 69%   - s/p chemoport   - PET/CT 1. Status post wedge resection of left upper lobe lung nodule, as compared to CT dated 4/13/2021. Additional FDG-avid, partially necrotic bilateral lung masses and mildly avid groundglass opacities are not significantly changed as compared to prior CT, compatible with biopsy-proven lymphoma. 2. Few mildly FDG-avid, mildly enlarged mediastinal and bilateral hilar lymph nodes are nonspecific. 3. Previously seen indeterminate3.1 cm lesion in the upper pole of right kidney is FDG-avid.  4. FDG-avid left adrenal nodule is indeterminate.   - start prednisone 100mg daily x 5 days (Day 2/5)   - ensure patient is on PPI while on steroids   - start allopurinol 300mg daily   - Plan to start chemotherapy as below:                        - Wednesday 5/19: cytoxan 750mg/m2, doxorubicin 50mg/m2, vincristine 1mg flat dose                        - Thursday 5/20: Rituximab 375mg/m2   - Fulphila appointment made for Saturday 5/22/21 at 1050am. Securing a lab appointment for next week and will update primary team once secured.   - Please arrange for LP for diagnostic and therapeutic LP (plan to treat with IT MTX 12mg). CSF flow to be sent: Planned for 5/20/21 AM and then will receive Rituximab after.   - anticoagulation needs to be held for LP procedure and can be resumed after per neuroradiology   - MRI Abdomen result noted (right renal lesion decreased in size and left adrenal lesion remains indeterminate)   - check TLS labs q8hr   - gentle IVF hydration given diastolic dysfunction     Vincent Novak MD   Hematology/Oncology Fellow   pager [phone]

## 2021-05-20 ENCOUNTER — RESULT REVIEW (OUTPATIENT)
Age: 81
End: 2021-05-20

## 2021-05-20 ENCOUNTER — TRANSCRIPTION ENCOUNTER (OUTPATIENT)
Age: 81
End: 2021-05-20

## 2021-05-20 ENCOUNTER — OUTPATIENT (OUTPATIENT)
Dept: OUTPATIENT SERVICES | Facility: HOSPITAL | Age: 81
LOS: 1 days | Discharge: HOME | End: 2021-05-20

## 2021-05-20 DIAGNOSIS — Z90.49 ACQUIRED ABSENCE OF OTHER SPECIFIED PARTS OF DIGESTIVE TRACT: Chronic | ICD-10-CM

## 2021-05-20 DIAGNOSIS — Z15.89 GENETIC SUSCEPTIBILITY TO OTHER DISEASE: ICD-10-CM

## 2021-05-20 LAB
ALBUMIN SERPL ELPH-MCNC: 3.3 G/DL — SIGNIFICANT CHANGE UP (ref 3.3–5)
ALBUMIN SERPL ELPH-MCNC: 3.6 G/DL — SIGNIFICANT CHANGE UP (ref 3.3–5)
ALP SERPL-CCNC: 62 U/L — SIGNIFICANT CHANGE UP (ref 40–120)
ALP SERPL-CCNC: 67 U/L — SIGNIFICANT CHANGE UP (ref 40–120)
ALT FLD-CCNC: 14 U/L — SIGNIFICANT CHANGE UP (ref 4–33)
ALT FLD-CCNC: 16 U/L — SIGNIFICANT CHANGE UP (ref 4–33)
ANION GAP SERPL CALC-SCNC: 11 MMOL/L — SIGNIFICANT CHANGE UP (ref 7–14)
ANION GAP SERPL CALC-SCNC: 9 MMOL/L — SIGNIFICANT CHANGE UP (ref 7–14)
APPEARANCE CSF: CLEAR — SIGNIFICANT CHANGE UP
APPEARANCE SPUN FLD: COLORLESS — SIGNIFICANT CHANGE UP
APTT BLD: 59.5 SEC — HIGH (ref 27–36.3)
AST SERPL-CCNC: 25 U/L — SIGNIFICANT CHANGE UP (ref 4–32)
AST SERPL-CCNC: 26 U/L — SIGNIFICANT CHANGE UP (ref 4–32)
BACTERIAL AG PNL SER: 0 % — SIGNIFICANT CHANGE UP
BASOPHILS # BLD AUTO: 0.02 K/UL — SIGNIFICANT CHANGE UP (ref 0–0.2)
BASOPHILS NFR BLD AUTO: 0.2 % — SIGNIFICANT CHANGE UP (ref 0–2)
BILIRUB SERPL-MCNC: 0.4 MG/DL — SIGNIFICANT CHANGE UP (ref 0.2–1.2)
BILIRUB SERPL-MCNC: 0.4 MG/DL — SIGNIFICANT CHANGE UP (ref 0.2–1.2)
BUN SERPL-MCNC: 15 MG/DL — SIGNIFICANT CHANGE UP (ref 7–23)
BUN SERPL-MCNC: 16 MG/DL — SIGNIFICANT CHANGE UP (ref 7–23)
CALCIUM SERPL-MCNC: 8.5 MG/DL — SIGNIFICANT CHANGE UP (ref 8.4–10.5)
CALCIUM SERPL-MCNC: 8.7 MG/DL — SIGNIFICANT CHANGE UP (ref 8.4–10.5)
CHLORIDE SERPL-SCNC: 108 MMOL/L — HIGH (ref 98–107)
CHLORIDE SERPL-SCNC: 110 MMOL/L — HIGH (ref 98–107)
CO2 SERPL-SCNC: 20 MMOL/L — LOW (ref 22–31)
CO2 SERPL-SCNC: 23 MMOL/L — SIGNIFICANT CHANGE UP (ref 22–31)
COLOR CSF: COLORLESS — SIGNIFICANT CHANGE UP
CREAT SERPL-MCNC: 0.63 MG/DL — SIGNIFICANT CHANGE UP (ref 0.5–1.3)
CREAT SERPL-MCNC: 0.69 MG/DL — SIGNIFICANT CHANGE UP (ref 0.5–1.3)
EOSINOPHIL # BLD AUTO: 0 K/UL — SIGNIFICANT CHANGE UP (ref 0–0.5)
EOSINOPHIL # CSF: 0 % — SIGNIFICANT CHANGE UP
EOSINOPHIL NFR BLD AUTO: 0 % — SIGNIFICANT CHANGE UP (ref 0–6)
GLUCOSE CSF-MCNC: 63 MG/DL — SIGNIFICANT CHANGE UP (ref 40–70)
GLUCOSE SERPL-MCNC: 114 MG/DL — HIGH (ref 70–99)
GLUCOSE SERPL-MCNC: 83 MG/DL — SIGNIFICANT CHANGE UP (ref 70–99)
HCT VFR BLD CALC: 36.8 % — SIGNIFICANT CHANGE UP (ref 34.5–45)
HGB BLD-MCNC: 12.1 G/DL — SIGNIFICANT CHANGE UP (ref 11.5–15.5)
IANC: 9.27 K/UL — HIGH (ref 1.5–8.5)
IMM GRANULOCYTES NFR BLD AUTO: 0.7 % — SIGNIFICANT CHANGE UP (ref 0–1.5)
LDH CSF L TO P-CCNC: 29 U/L — SIGNIFICANT CHANGE UP
LDH FLD-CCNC: 29 U/L — SIGNIFICANT CHANGE UP
LDH SERPL L TO P-CCNC: 255 U/L — HIGH (ref 135–225)
LDH SERPL L TO P-CCNC: 265 U/L — HIGH (ref 135–225)
LYMPHOCYTES # BLD AUTO: 18.5 % — SIGNIFICANT CHANGE UP (ref 13–44)
LYMPHOCYTES # BLD AUTO: 2.42 K/UL — SIGNIFICANT CHANGE UP (ref 1–3.3)
LYMPHOCYTES # CSF: 33 % — SIGNIFICANT CHANGE UP
MAGNESIUM SERPL-MCNC: 2 MG/DL — SIGNIFICANT CHANGE UP (ref 1.6–2.6)
MAGNESIUM SERPL-MCNC: 2 MG/DL — SIGNIFICANT CHANGE UP (ref 1.6–2.6)
MCHC RBC-ENTMCNC: 32.9 GM/DL — SIGNIFICANT CHANGE UP (ref 32–36)
MCHC RBC-ENTMCNC: 33.3 PG — SIGNIFICANT CHANGE UP (ref 27–34)
MCV RBC AUTO: 101.4 FL — HIGH (ref 80–100)
MONOCYTES # BLD AUTO: 1.25 K/UL — HIGH (ref 0–0.9)
MONOCYTES NFR BLD AUTO: 9.6 % — SIGNIFICANT CHANGE UP (ref 2–14)
MONOS+MACROS NFR CSF: 67 % — SIGNIFICANT CHANGE UP
NEUTROPHILS # BLD AUTO: 9.27 K/UL — HIGH (ref 1.8–7.4)
NEUTROPHILS # CSF: 0 % — SIGNIFICANT CHANGE UP
NEUTROPHILS NFR BLD AUTO: 71 % — SIGNIFICANT CHANGE UP (ref 43–77)
NRBC # BLD: 0 /100 WBCS — SIGNIFICANT CHANGE UP
NRBC # FLD: 0 K/UL — SIGNIFICANT CHANGE UP
NRBC NFR CSF: 1 CELLS/UL — SIGNIFICANT CHANGE UP (ref 0–5)
OTHER CELLS CSF MANUAL: 0 % — SIGNIFICANT CHANGE UP
PHOSPHATE SERPL-MCNC: 2.5 MG/DL — SIGNIFICANT CHANGE UP (ref 2.5–4.5)
PLATELET # BLD AUTO: 318 K/UL — SIGNIFICANT CHANGE UP (ref 150–400)
POTASSIUM SERPL-MCNC: 3.7 MMOL/L — SIGNIFICANT CHANGE UP (ref 3.5–5.3)
POTASSIUM SERPL-MCNC: 3.7 MMOL/L — SIGNIFICANT CHANGE UP (ref 3.5–5.3)
POTASSIUM SERPL-SCNC: 3.7 MMOL/L — SIGNIFICANT CHANGE UP (ref 3.5–5.3)
POTASSIUM SERPL-SCNC: 3.7 MMOL/L — SIGNIFICANT CHANGE UP (ref 3.5–5.3)
PROT CSF-MCNC: 43 MG/DL — SIGNIFICANT CHANGE UP (ref 15–45)
PROT SERPL-MCNC: 6.4 G/DL — SIGNIFICANT CHANGE UP (ref 6–8.3)
PROT SERPL-MCNC: 6.9 G/DL — SIGNIFICANT CHANGE UP (ref 6–8.3)
RBC # BLD: 3.63 M/UL — LOW (ref 3.8–5.2)
RBC # CSF: 23 CELLS/UL — HIGH (ref 0–0)
RBC # FLD: 14.4 % — SIGNIFICANT CHANGE UP (ref 10.3–14.5)
SODIUM SERPL-SCNC: 140 MMOL/L — SIGNIFICANT CHANGE UP (ref 135–145)
SODIUM SERPL-SCNC: 141 MMOL/L — SIGNIFICANT CHANGE UP (ref 135–145)
TOTAL CELLS COUNTED, SPINAL FLUID: 3 CELLS — SIGNIFICANT CHANGE UP
TUBE TYPE: SIGNIFICANT CHANGE UP
URATE SERPL-MCNC: 2.6 MG/DL — SIGNIFICANT CHANGE UP (ref 2.5–7)
WBC # BLD: 13.05 K/UL — HIGH (ref 3.8–10.5)
WBC # FLD AUTO: 13.05 K/UL — HIGH (ref 3.8–10.5)

## 2021-05-20 PROCEDURE — 88188 FLOWCYTOMETRY/READ 9-15: CPT

## 2021-05-20 PROCEDURE — 77003 FLUOROGUIDE FOR SPINE INJECT: CPT | Mod: 26,GC

## 2021-05-20 PROCEDURE — 96450 CHEMOTHERAPY INTO CNS: CPT

## 2021-05-20 PROCEDURE — 99233 SBSQ HOSP IP/OBS HIGH 50: CPT | Mod: GC

## 2021-05-20 RX ORDER — METHOTREXATE 2.5 MG/1
12 TABLET ORAL ONCE
Refills: 0 | Status: DISCONTINUED | OUTPATIENT
Start: 2021-05-20 | End: 2021-05-21

## 2021-05-20 RX ORDER — MEPERIDINE HYDROCHLORIDE 50 MG/ML
25 INJECTION INTRAMUSCULAR; INTRAVENOUS; SUBCUTANEOUS ONCE
Refills: 0 | Status: DISCONTINUED | OUTPATIENT
Start: 2021-05-20 | End: 2021-05-21

## 2021-05-20 RX ORDER — APIXABAN 2.5 MG/1
5 TABLET, FILM COATED ORAL
Refills: 0 | Status: DISCONTINUED | OUTPATIENT
Start: 2021-05-21 | End: 2021-05-21

## 2021-05-20 RX ADMIN — VALACYCLOVIR 1000 MILLIGRAM(S): 500 TABLET, FILM COATED ORAL at 06:19

## 2021-05-20 RX ADMIN — EZETIMIBE 10 MILLIGRAM(S): 10 TABLET ORAL at 12:35

## 2021-05-20 RX ADMIN — VALACYCLOVIR 1000 MILLIGRAM(S): 500 TABLET, FILM COATED ORAL at 17:26

## 2021-05-20 RX ADMIN — SODIUM CHLORIDE 75 MILLILITER(S): 9 INJECTION INTRAMUSCULAR; INTRAVENOUS; SUBCUTANEOUS at 12:35

## 2021-05-20 RX ADMIN — Medication 50 MILLIGRAM(S): at 16:43

## 2021-05-20 RX ADMIN — CITALOPRAM 10 MILLIGRAM(S): 10 TABLET, FILM COATED ORAL at 12:35

## 2021-05-20 RX ADMIN — Medication 650 MILLIGRAM(S): at 16:43

## 2021-05-20 RX ADMIN — Medication 100 MILLIGRAM(S): at 06:19

## 2021-05-20 RX ADMIN — Medication 300 MILLIGRAM(S): at 12:35

## 2021-05-20 RX ADMIN — CHLORHEXIDINE GLUCONATE 1 APPLICATION(S): 213 SOLUTION TOPICAL at 12:35

## 2021-05-20 RX ADMIN — RITUXIMAB 622 MILLIGRAM(S): 10 INJECTION, SOLUTION INTRAVENOUS at 17:54

## 2021-05-20 RX ADMIN — PANTOPRAZOLE SODIUM 40 MILLIGRAM(S): 20 TABLET, DELAYED RELEASE ORAL at 06:19

## 2021-05-20 NOTE — PROGRESS NOTE ADULT - PROBLEM SELECTOR PLAN 4
DVT: on therapeutic coagulation.  Diet: Regular, c/w home PPI  Dispo: expect 5 days of hospitalization for chemo.

## 2021-05-20 NOTE — PROGRESS NOTE ADULT - ASSESSMENT
80 year old female with history of ANCA vasculitis diagnosed 20 years (has been following with Rheumatology on Azathioprine) presented to [facility] for first cycle of chemotherapy for DLBCL.    #EBV+ DLBCL  - diagnosed 4/13/21 after presenting to the ED with SOB/CP found to have RML RLL PE and b/l LE DVT along with lung masses up to 8.6cm s/p VATS/biopsy confirming EBV+ DLBCL: VEENA+, positive for: CD20 CD79a Pax5 MUM1 Bcl2 CD43 varriable CD30, Negative: CD5 CD10 CD23 Cyclin D1 p53 cmyc, Ki67 >90%.  - Hepatitis panel and HIV negative   - TTE 4/21/21 69%   - s/p chemoport   - PET/CT 1. Status post wedge resection of left upper lobe lung nodule, as compared to CT dated 4/13/2021. Additional FDG-avid, partially necrotic bilateral lung masses and mildly avid groundglass opacities are not significantly changed as compared to prior CT, compatible with biopsy-proven lymphoma. 2. Few mildly FDG-avid, mildly enlarged mediastinal and bilateral hilar lymph nodes are nonspecific. 3. Previously seen indeterminate3.1 cm lesion in the upper pole of right kidney is FDG-avid.  4. FDG-avid left adrenal nodule is indeterminate.   - start prednisone 100mg daily x 5 days (Day 4/5)   - ensure patient is on PPI while on steroids   - continue allopurinol 300mg daily   - Plan to start chemotherapy as below:                        - Wednesday 5/19: cytoxan 750mg/m2, doxorubicin 50mg/m2, vincristine 1mg flat dose                        - Thursday 5/20: Rituximab 375mg/m2   - Fulphila appointment made for Saturday 5/22/21 at 1050am. Securing a lab appointment for next week and will update primary team once secured.   - Please arrange for LP for diagnostic and therapeutic LP (plan to treat with IT MTX 12mg). CSF flow to be sent: Planned for 5/20/21 AM and then will receive Rituximab after.   - anticoagulation needs to be held for LP procedure and can be resumed after per neuroradiology   - MRI Abdomen result noted (right renal lesion decreased in size and left adrenal lesion remains indeterminate)   - check TLS labs q8hr   - gentle IVF hydration given diastolic dysfunction     Vincent Novak MD   Hematology/Oncology Fellow   pager [phone]  80 year old female with history of ANCA vasculitis diagnosed 20 years (has been following with Rheumatology on Azathioprine) presented to [facility] for first cycle of chemotherapy for DLBCL.    #EBV+ DLBCL  - diagnosed 4/13/21 after presenting to the ED with SOB/CP found to have RML RLL PE and b/l LE DVT along with lung masses up to 8.6cm s/p VATS/biopsy confirming EBV+ DLBCL: VEENA+, positive for: CD20 CD79a Pax5 MUM1 Bcl2 CD43 varriable CD30, Negative: CD5 CD10 CD23 Cyclin D1 p53 cmyc, Ki67 >90%.  - Hepatitis panel and HIV negative   - TTE 4/21/21 69%   - s/p chemoport   - PET/CT 1. Status post wedge resection of left upper lobe lung nodule, as compared to CT dated 4/13/2021. Additional FDG-avid, partially necrotic bilateral lung masses and mildly avid groundglass opacities are not significantly changed as compared to prior CT, compatible with biopsy-proven lymphoma. 2. Few mildly FDG-avid, mildly enlarged mediastinal and bilateral hilar lymph nodes are nonspecific. 3. Previously seen indeterminate3.1 cm lesion in the upper pole of right kidney is FDG-avid.  4. FDG-avid left adrenal nodule is indeterminate.   - start prednisone 100mg daily x 5 days (Day 4/5)   - ensure patient is on PPI while on steroids   - continue allopurinol 300mg daily   - Plan to start chemotherapy as below:                        - Wednesday 5/19: cytoxan 750mg/m2, doxorubicin 50mg/m2, vincristine 1mg flat dose                        - Thursday 5/20: Rituximab 375mg/m2   - Fulphila appointment made for Saturday 5/22/21 at 1050am. Securing a lab appointment for next week and will update primary team once secured.   - Please arrange for LP for diagnostic and therapeutic LP (plan to treat with IT MTX 12mg). CSF flow, glucose, LDH, protein and cell count to be sent: Planned for 5/20/21 AM and then will receive Rituximab after.   - anticoagulation needs to be held for LP procedure and can be resumed after per neuroradiology   - MRI Abdomen result noted (right renal lesion decreased in size and left adrenal lesion remains indeterminate)   - check TLS labs q8hr   - gentle IVF hydration given diastolic dysfunction     Vincent Novak MD   Hematology/Oncology Fellow   pager [phone]  80 year old female with history of ANCA vasculitis diagnosed 20 years (has been following with Rheumatology on Azathioprine) presented to [facility] for first cycle of chemotherapy for DLBCL.    #EBV+ DLBCL  - diagnosed 4/13/21 after presenting to the ED with SOB/CP found to have RML RLL PE and b/l LE DVT along with lung masses up to 8.6cm s/p VATS/biopsy confirming EBV+ DLBCL: VEENA+, positive for: CD20 CD79a Pax5 MUM1 Bcl2 CD43 varriable CD30, Negative: CD5 CD10 CD23 Cyclin D1 p53 cmyc, Ki67 >90%.  - Hepatitis panel and HIV negative   - TTE 4/21/21 69%   - s/p chemoport   - PET/CT 1. Status post wedge resection of left upper lobe lung nodule, as compared to CT dated 4/13/2021. Additional FDG-avid, partially necrotic bilateral lung masses and mildly avid groundglass opacities are not significantly changed as compared to prior CT, compatible with biopsy-proven lymphoma. 2. Few mildly FDG-avid, mildly enlarged mediastinal and bilateral hilar lymph nodes are nonspecific. 3. Previously seen indeterminate3.1 cm lesion in the upper pole of right kidney is FDG-avid.  4. FDG-avid left adrenal nodule is indeterminate.   - start prednisone 100mg daily x 5 days (Day 4/5)   - ensure patient is on PPI while on steroids   - continue allopurinol 300mg daily   - Plan to start chemotherapy as below:                        - Wednesday 5/19: cytoxan 750mg/m2, doxorubicin 50mg/m2, vincristine 1mg flat dose                        - Thursday 5/20: Rituximab 375mg/m2   - Fulphila appointment made for Saturday 5/22/21 at 1050am. Lab appointment for count check made for Wednesday 5/24 at 3pm.   - Please arrange for LP for diagnostic and therapeutic LP (plan to treat with IT MTX 12mg). CSF flow, glucose, LDH, protein and cell count to be sent: Planned for 5/20/21 AM and then will receive Rituximab after.   - anticoagulation needs to be held for LP procedure and can be resumed after per neuroradiology   - MRI Abdomen result noted (right renal lesion decreased in size and left adrenal lesion remains indeterminate)   - check TLS labs q8hr   - gentle IVF hydration given diastolic dysfunction     Vincent Novak MD   Hematology/Oncology Fellow   pager [phone]

## 2021-05-20 NOTE — PROGRESS NOTE ADULT - PROBLEM SELECTOR PROBLEM 2
Chronic pulmonary embolism without acute cor pulmonale, unspecified pulmonary embolism type

## 2021-05-20 NOTE — PROGRESS NOTE ADULT - PROBLEM SELECTOR PROBLEM 1
Diffuse large B-cell lymphoma of solid organ excluding spleen

## 2021-05-20 NOTE — DISCHARGE NOTE PROVIDER - PROVIDER TOKENS
PROVIDER:[TOKEN:[[id number]]] PROVIDER:[TOKEN:[[id number]]],PROVIDER:[TOKEN:[[id number]],FOLLOWUP:[1 week]]

## 2021-05-20 NOTE — CHART NOTE - NSCHARTNOTEFT_GEN_A_CORE
Pt s/p IT MTX today. Per discussion with Dr. Robertson and hematology, plan to resume AC tomorrow (Eliquis to be resumed tomorrow).

## 2021-05-20 NOTE — DISCHARGE NOTE PROVIDER - NSDCCPCAREPLAN_GEN_ALL_CORE_FT
PRINCIPAL DISCHARGE DIAGNOSIS  Diagnosis: Diffuse large B-cell lymphoma of solid organ excluding spleen  Assessment and Plan of Treatment: You were admitted for chemotherapy. You recieved chemotherapy intravenously. You also recieved intrathecal methotrexate via lumbar puncture. You tolerated the treatment. Please follow up with your oncologist. You are scheduled for: Fulphila appointment Saturday 5/22/21 at 1050am. You are also scheduled for a lab appointment for count check made for Wednesday 5/24 at 3pm.      SECONDARY DISCHARGE DIAGNOSES  Diagnosis: Chronic pulmonary embolism without acute cor pulmonale, unspecified pulmonary embolism type  Assessment and Plan of Treatment: Continue with Eliquis.    Diagnosis: Anxiety and depression  Assessment and Plan of Treatment: Continue with Citalopram.     PRINCIPAL DISCHARGE DIAGNOSIS  Diagnosis: Diffuse large B-cell lymphoma of solid organ excluding spleen  Assessment and Plan of Treatment: You were admitted for chemotherapy. You recieved chemotherapy intravenously. You also recieved intrathecal methotrexate via lumbar puncture. You tolerated the treatment. Please follow up with your oncologist. You are scheduled for: Fulphila appointment Saturday 5/22/21 at 1050am. You are also scheduled for a lab appointment for count check made for Wednesday 5/24 at 3pm. Continue valcyclovir as directed.      SECONDARY DISCHARGE DIAGNOSES  Diagnosis: Chronic pulmonary embolism without acute cor pulmonale, unspecified pulmonary embolism type  Assessment and Plan of Treatment: Continue with Eliquis.    Diagnosis: Anxiety and depression  Assessment and Plan of Treatment: Continue with Citalopram.

## 2021-05-20 NOTE — PROGRESS NOTE ADULT - PROBLEM SELECTOR PLAN 1
diagnosed in 4/2021 presented for chemo  There was additional adrenal and renal mass - MRI Abd/Pelvis with contrast shows decreased size.   Need to r/o CNS involvement - Neuro radiology consulted for ITM and CSF sampling.   - patient on Eliquis, per discussion with Neurorad, will need to hold DOAC for 72hrs prior to LP. last dose Eliquis 5/17 @8AM. Call Neurorad on 5/19 for possible LP on 5/20. repeat coag on 5/19  start gentle hydration - recent echo EF69% Grade 1 diastolic DF. monitor for pulmonary edema.  TLS labs now and q8hrs. - CMP, Mg, Phos, CBC, Uric Acid, LDH  chemo plan per Hematology to start today-  .- Wednesday 5/19: cytoxan 750mg/m2, doxorubicin 50mg/m2, vincristine 1mg flat dose        Thursday 5/20: Rituximab 375mg/m2    for diagnostic and therapeutic LP (plan to treat with IT MTX 12mg). CSF flow to be sent: Planned for 5/20/21 AM and then will receive Rituximab after.
diagnosed in 4/2021 presented for chemo  There was additional adrenal and renal mass - MRI Abd/Pelvis with contrast shows decreased size.   Need to r/o CNS involvement - Neuro radiology consulted for ITM and CSF sampling.   - patient on Eliquis, per discussion with Neurorad, will need to hold DOAC for 72hrs prior to LP. last dose Eliquis 5/17 @8AM. Call Neurorad on 5/19 for possible LP on 5/20. repeat coag on 5/19  start gentle hydration - recent echo EF69% Grade 1 diastolic DF. monitor for pulmonary edema.  TLS labs now and q8hrs. - CMP, Mg, Phos, CBC, Uric Acid, LDH  chemo plan per Hematology to start today-  .- Wednesday 5/19: cytoxan 750mg/m2, doxorubicin 50mg/m2, vincristine 1mg flat dose        Thursday 5/20: Rituximab 375mg/m2    for diagnostic and therapeutic LP (plan to treat with IT MTX 12mg). CSF flow to be sent: Planned for 5/21/21 AM and then will receive Rituximab after.
diagnosed in 4/2021 presented for chemo  There was additional adrenal and renal mass - MRI Abd/Pelvis with contrast shows decreased size.   Need to r/o CNS involvement - Neuro radiology consulted for ITM and CSF sampling.   - patient on Eliquis, per discussion with Neurorad, will need to hold DOAC for 72hrs prior to LP. last dose Eliquis 5/17 @8AM. Call Neurorad on 5/19 for possible LP on 5/20. repeat coag on 5/19  start gentle hydration - recent echo EF69% Grade 1 diastolic DF. monitor for pulmonary edema.  TLS labs now and q8hrs. - CMP, Mg, Phos, CBC, Uric Acid, LDH  chemo plan per Hematology.

## 2021-05-20 NOTE — DISCHARGE NOTE PROVIDER - NSDCFUSCHEDAPPT_GEN_ALL_CORE_FT
BETTIE PRATER ; 05/22/2021 ; [facility]  BETTIE PRATER ; 05/26/2021 ; [facility]  BETTIE PRATER ; 05/27/2021 ; [address]  BETTIE PRATER ; 06/10/2021 ; [facility]  BETTIE PRATER ; 07/01/2021 ; [facility]  BETTIE PRATER ; 07/22/2021 ; [facility]  BETTIE PRATER ; 08/12/2021 ; [facility]

## 2021-05-20 NOTE — DISCHARGE NOTE PROVIDER - CARE PROVIDERS DIRECT ADDRESSES
[email or web address]

## 2021-05-20 NOTE — DISCHARGE NOTE PROVIDER - CARE PROVIDER_API CALL
Shaina Mejia)  Internal Medicine  [address] [place] [address]  Phone: [phone]  Fax: [phone]  Follow Up Time:    Shaina Mejia)  Internal Medicine  [address] [place] [address]  Phone: [phone]  Fax: [phone]  Follow Up Time:     Zonia Madrigal)  Internal Medicine  [address] [place] [address]  Phone: [phone]  Fax: ()-  Follow Up Time: 1 week

## 2021-05-20 NOTE — PROGRESS NOTE ADULT - PROBLEM SELECTOR PLAN 2
currently on Eliquis (last dose 5/17/21 @8AM)  - hold Eliquis for LP. start Hep gtt at 8PM tonight.  monitor O2.
currently on Eliquis (last dose 5/17/21 @8AM)  - hold Eliquis for LP. start Hep gtt at 8PM tonight.  monitor O2.
currently on Eliquis (last dose 5/17/21 @8AM)  - hold Eliquis for LP. Hep gtt  monitor O2.

## 2021-05-20 NOTE — DISCHARGE NOTE PROVIDER - NSFOLLOWUPCLINICS_GEN_ALL_ED_FT
[facility]  Hematology/Oncology  [address] [id number]  Phone: [phone]  Fax:

## 2021-05-20 NOTE — DISCHARGE NOTE PROVIDER - HOSPITAL COURSE
80 year old female with PMHx of HTN, ANCA vasculitis (8yrs ago was on Azathioprine, stopped on last admission due to cancer), recently diagnosed PE (on Eliquis) and DLBCL with pulmonary involvement found in 4/2021 presented for chemotherapy initiation.     Hospital course:     Diffuse large B-cell lymphoma  - diagnosed in 4/2021 presented for chemo  - MRI Abd/Pelvis: MRI Abdomen: right renal lesion decreased in size and left adrenal lesion remains indeterminate  - Need to r/o CNS involvement - S/p LP on 5/20 with NeuroRads   - Patient on Eliquis, per discussion with Neurorad, will need to hold DOAC for 72hrs prior to LP. last dose Eliquis 5/17 @8AM --> thus changed to heparin gtt   - S/p chemo: RCHOP (pt also given gentle hydration, recent echo EF69% Grade 1 diastolic DF)  - IT MTX on 5/20 with neuro-rads   - Monitored TLS labs now and q8hrs  - Chemo plan per Heme (RCHOP)    Chronic pulmonary embolism without acute cor pulmonale, unspecified pulmonary embolism type  - currently on Eliquis (last dose 5/17/21 @8AM), changed to heparin gtt in preparation for LP     Anxiety and depression  - c/w Escitalopram home dose.     Prophylactic measure  - DVT: on therapeutic coagulation.  - Diet: Regular, c/w home PPI    Pt medically stable for discharge on 5/21 as per discussion with ______.  Dispo: home        80 year old female with PMHx of HTN, ANCA vasculitis (8yrs ago was on Azathioprine, stopped on last admission due to cancer), recently diagnosed PE (on Eliquis) and DLBCL with pulmonary involvement found in 4/2021 presented for chemotherapy initiation.     Hospital course:     Diffuse large B-cell lymphoma  - diagnosed in 4/2021 presented for chemo  - MRI Abd/Pelvis: MRI Abdomen: right renal lesion decreased in size and left adrenal lesion remains indeterminate  - Need to r/o CNS involvement - S/p LP on 5/20 with NeuroRads   - Patient on Eliquis, per discussion with Neurorad, will need to hold DOAC for 72hrs prior to LP. last dose Eliquis 5/17 @8AM --> thus changed to heparin gtt   - S/p chemo: RCHOP (pt also given gentle hydration, recent echo EF69% Grade 1 diastolic DF)  - IT MTX on 5/20 with neuro-rads   - Monitored TLS labs now and q8hrs  - S/p Chemo per Heme (RCHOP)    Chronic pulmonary embolism without acute cor pulmonale, unspecified pulmonary embolism type  - currently on Eliquis (last dose 5/17/21 @8AM), changed to heparin gtt in preparation for LP     Anxiety and depression  - c/w Escitalopram home dose.     Prophylactic measure  - DVT: on therapeutic coagulation.  - Diet: Regular, c/w home PPI    Pt medically stable for discharge on 5/21 as per discussion with  Dr. Willams and hematology.   Dispo: home        80 year old female with PMHx of HTN, ANCA vasculitis (8yrs ago was on Azathioprine, stopped on last admission due to cancer), recently diagnosed PE (on Eliquis) and DLBCL with pulmonary involvement found in 4/2021 presented for chemotherapy initiation.     Hospital course:     Diffuse large B-cell lymphoma  - diagnosed in 4/2021 presented for chemo  - MRI Abd/Pelvis: MRI Abdomen: right renal lesion decreased in size and left adrenal lesion remains indeterminate  - Need to r/o CNS involvement - S/p LP on 5/20 with NeuroRads   - Patient on Eliquis, per discussion with Neurorad, will need to hold DOAC for 72hrs prior to LP. last dose Eliquis 5/17 @8AM --> thus changed to heparin gtt, transitioned back to Eliquis on 5/21  - S/p chemo: RCHOP (pt also given gentle hydration, recent echo EF69% Grade 1 diastolic DF)  - IT MTX on 5/20 with neuro-rads   - Monitored TLS labs now and q8hrs  - S/p Chemo per Heme (RCHOP)  - Per discussion with hematology Dr. Novak- no need for allopurinol on d/c and continue valacyclovir.     Chronic pulmonary embolism without acute cor pulmonale, unspecified pulmonary embolism type  - currently on Eliquis (last dose 5/17/21 @8AM), changed to heparin gtt in preparation for LP--> transitioned back to Eliquis on 5/21     Anxiety and depression  - c/w Escitalopram home dose.     Prophylactic measure  - DVT: on therapeutic coagulation.  - Diet: Regular, c/w home PPI    Pt medically stable for discharge on 5/21 as per discussion with  Dr. Willams and hematology.   Dispo: home        80 year old female with PMHx of HTN, ANCA vasculitis (8yrs ago was on Azathioprine, stopped on last admission due to cancer), recently diagnosed PE (on Eliquis) and DLBCL with pulmonary involvement found in 4/2021 presented for chemotherapy initiation.     Hospital course:     Diffuse large B-cell lymphoma  - diagnosed in 4/2021 presented for chemo  - MRI Abd/Pelvis: MRI Abdomen: right renal lesion decreased in size and left adrenal lesion remains indeterminate  - Need to r/o CNS involvement - S/p LP on 5/20 with NeuroRads   - Patient on Eliquis, per discussion with Neurorad, will need to hold DOAC for 72hrs prior to LP. last dose Eliquis 5/17 @8AM --> thus changed to heparin gtt, transitioned back to Eliquis on 5/21  - S/p chemo: RCHOP (pt also given gentle hydration, recent echo EF69% Grade 1 diastolic DF)  - IT MTX on 5/20 with neuro-rads   - Monitored TLS labs now and q8hrs  - S/p Chemo per Heme (RCHOP)  - Per discussion with hematology Dr. Novak- no need for allopurinol on d/c and continue valacyclovir.     Chronic pulmonary embolism without acute cor pulmonale, unspecified pulmonary embolism type  - currently on Eliquis (last dose 5/17/21 @8AM), changed to heparin gtt in preparation for LP--> transitioned back to Eliquis on 5/21     Anxiety and depression  - c/w Escitalopram home dose.     Borderline htn  - pt denies any hx of htn   - reports BP well controlled at home, also on IVF during hospital stay   - no BP meds on d/c per Dr. Robertson     Prophylactic measure  - DVT: on therapeutic coagulation.  - Diet: Regular, c/w home PPI    Pt medically stable for discharge on 5/21 as per discussion with  Dr. Willams and hematology.   Dispo: home

## 2021-05-21 ENCOUNTER — TRANSCRIPTION ENCOUNTER (OUTPATIENT)
Age: 81
End: 2021-05-21

## 2021-05-21 VITALS
HEART RATE: 61 BPM | RESPIRATION RATE: 19 BRPM | SYSTOLIC BLOOD PRESSURE: 157 MMHG | OXYGEN SATURATION: 99 % | DIASTOLIC BLOOD PRESSURE: 74 MMHG | TEMPERATURE: 98 F

## 2021-05-21 LAB
ALBUMIN SERPL ELPH-MCNC: 3.3 G/DL — SIGNIFICANT CHANGE UP (ref 3.3–5)
ALBUMIN SERPL ELPH-MCNC: 3.4 G/DL — SIGNIFICANT CHANGE UP (ref 3.3–5)
ALP SERPL-CCNC: 62 U/L — SIGNIFICANT CHANGE UP (ref 40–120)
ALP SERPL-CCNC: 63 U/L — SIGNIFICANT CHANGE UP (ref 40–120)
ALT FLD-CCNC: 13 U/L — SIGNIFICANT CHANGE UP (ref 4–33)
ALT FLD-CCNC: 14 U/L — SIGNIFICANT CHANGE UP (ref 4–33)
ANION GAP SERPL CALC-SCNC: 11 MMOL/L — SIGNIFICANT CHANGE UP (ref 7–14)
ANION GAP SERPL CALC-SCNC: 9 MMOL/L — SIGNIFICANT CHANGE UP (ref 7–14)
AST SERPL-CCNC: 23 U/L — SIGNIFICANT CHANGE UP (ref 4–32)
AST SERPL-CCNC: 27 U/L — SIGNIFICANT CHANGE UP (ref 4–32)
BASOPHILS # BLD AUTO: 0 K/UL — SIGNIFICANT CHANGE UP (ref 0–0.2)
BASOPHILS NFR BLD AUTO: 0 % — SIGNIFICANT CHANGE UP (ref 0–2)
BILIRUB SERPL-MCNC: 0.4 MG/DL — SIGNIFICANT CHANGE UP (ref 0.2–1.2)
BILIRUB SERPL-MCNC: 0.5 MG/DL — SIGNIFICANT CHANGE UP (ref 0.2–1.2)
BUN SERPL-MCNC: 14 MG/DL — SIGNIFICANT CHANGE UP (ref 7–23)
BUN SERPL-MCNC: 18 MG/DL — SIGNIFICANT CHANGE UP (ref 7–23)
CALCIUM SERPL-MCNC: 8.7 MG/DL — SIGNIFICANT CHANGE UP (ref 8.4–10.5)
CALCIUM SERPL-MCNC: 8.8 MG/DL — SIGNIFICANT CHANGE UP (ref 8.4–10.5)
CHLORIDE SERPL-SCNC: 108 MMOL/L — HIGH (ref 98–107)
CHLORIDE SERPL-SCNC: 109 MMOL/L — HIGH (ref 98–107)
CO2 SERPL-SCNC: 19 MMOL/L — LOW (ref 22–31)
CO2 SERPL-SCNC: 22 MMOL/L — SIGNIFICANT CHANGE UP (ref 22–31)
COVID-19 SPIKE DOMAIN AB INTERP: POSITIVE
COVID-19 SPIKE DOMAIN ANTIBODY RESULT: 4.75 U/ML — HIGH
CREAT SERPL-MCNC: 0.6 MG/DL — SIGNIFICANT CHANGE UP (ref 0.5–1.3)
CREAT SERPL-MCNC: 0.84 MG/DL — SIGNIFICANT CHANGE UP (ref 0.5–1.3)
EOSINOPHIL # BLD AUTO: 0 K/UL — SIGNIFICANT CHANGE UP (ref 0–0.5)
EOSINOPHIL NFR BLD AUTO: 0 % — SIGNIFICANT CHANGE UP (ref 0–6)
GLUCOSE SERPL-MCNC: 113 MG/DL — HIGH (ref 70–99)
GLUCOSE SERPL-MCNC: 207 MG/DL — HIGH (ref 70–99)
HCT VFR BLD CALC: 36.7 % — SIGNIFICANT CHANGE UP (ref 34.5–45)
HGB BLD-MCNC: 12 G/DL — SIGNIFICANT CHANGE UP (ref 11.5–15.5)
IANC: 8.6 K/UL — HIGH (ref 1.5–8.5)
IMM GRANULOCYTES NFR BLD AUTO: 0.7 % — SIGNIFICANT CHANGE UP (ref 0–1.5)
LDH SERPL L TO P-CCNC: 263 U/L — HIGH (ref 135–225)
LDH SERPL L TO P-CCNC: 312 U/L — HIGH (ref 135–225)
LYMPHOCYTES # BLD AUTO: 0.48 K/UL — LOW (ref 1–3.3)
LYMPHOCYTES # BLD AUTO: 5.1 % — LOW (ref 13–44)
MAGNESIUM SERPL-MCNC: 2 MG/DL — SIGNIFICANT CHANGE UP (ref 1.6–2.6)
MCHC RBC-ENTMCNC: 32.7 GM/DL — SIGNIFICANT CHANGE UP (ref 32–36)
MCHC RBC-ENTMCNC: 33.4 PG — SIGNIFICANT CHANGE UP (ref 27–34)
MCV RBC AUTO: 102.2 FL — HIGH (ref 80–100)
MONOCYTES # BLD AUTO: 0.21 K/UL — SIGNIFICANT CHANGE UP (ref 0–0.9)
MONOCYTES NFR BLD AUTO: 2.2 % — SIGNIFICANT CHANGE UP (ref 2–14)
NEUTROPHILS # BLD AUTO: 8.6 K/UL — HIGH (ref 1.8–7.4)
NEUTROPHILS NFR BLD AUTO: 92 % — HIGH (ref 43–77)
NRBC # BLD: 0 /100 WBCS — SIGNIFICANT CHANGE UP
NRBC # FLD: 0 K/UL — SIGNIFICANT CHANGE UP
PHOSPHATE SERPL-MCNC: 2.9 MG/DL — SIGNIFICANT CHANGE UP (ref 2.5–4.5)
PLATELET # BLD AUTO: 268 K/UL — SIGNIFICANT CHANGE UP (ref 150–400)
POTASSIUM SERPL-MCNC: 3.3 MMOL/L — LOW (ref 3.5–5.3)
POTASSIUM SERPL-MCNC: 4.7 MMOL/L — SIGNIFICANT CHANGE UP (ref 3.5–5.3)
POTASSIUM SERPL-SCNC: 3.3 MMOL/L — LOW (ref 3.5–5.3)
POTASSIUM SERPL-SCNC: 4.7 MMOL/L — SIGNIFICANT CHANGE UP (ref 3.5–5.3)
PROT SERPL-MCNC: 6.2 G/DL — SIGNIFICANT CHANGE UP (ref 6–8.3)
PROT SERPL-MCNC: 6.5 G/DL — SIGNIFICANT CHANGE UP (ref 6–8.3)
RBC # BLD: 3.59 M/UL — LOW (ref 3.8–5.2)
RBC # FLD: 14.1 % — SIGNIFICANT CHANGE UP (ref 10.3–14.5)
SARS-COV-2 IGG+IGM SERPL QL IA: 4.75 U/ML — HIGH
SARS-COV-2 IGG+IGM SERPL QL IA: POSITIVE
SODIUM SERPL-SCNC: 138 MMOL/L — SIGNIFICANT CHANGE UP (ref 135–145)
SODIUM SERPL-SCNC: 140 MMOL/L — SIGNIFICANT CHANGE UP (ref 135–145)
URATE SERPL-MCNC: 2.9 MG/DL — SIGNIFICANT CHANGE UP (ref 2.5–7)
WBC # BLD: 9.36 K/UL — SIGNIFICANT CHANGE UP (ref 3.8–10.5)
WBC # FLD AUTO: 9.36 K/UL — SIGNIFICANT CHANGE UP (ref 3.8–10.5)

## 2021-05-21 PROCEDURE — 99233 SBSQ HOSP IP/OBS HIGH 50: CPT | Mod: GC

## 2021-05-21 RX ORDER — ACETAMINOPHEN 500 MG
1 TABLET ORAL
Qty: 0 | Refills: 0 | DISCHARGE
Start: 2021-05-21

## 2021-05-21 RX ORDER — METOCLOPRAMIDE HCL 10 MG
1 TABLET ORAL
Qty: 15 | Refills: 0
Start: 2021-05-21 | End: 2021-05-25

## 2021-05-21 RX ORDER — VALACYCLOVIR 500 MG/1
1 TABLET, FILM COATED ORAL
Qty: 60 | Refills: 0
Start: 2021-05-21 | End: 2021-06-19

## 2021-05-21 RX ADMIN — CITALOPRAM 10 MILLIGRAM(S): 10 TABLET, FILM COATED ORAL at 12:38

## 2021-05-21 RX ADMIN — Medication 100 MILLIGRAM(S): at 07:00

## 2021-05-21 RX ADMIN — PANTOPRAZOLE SODIUM 40 MILLIGRAM(S): 20 TABLET, DELAYED RELEASE ORAL at 06:59

## 2021-05-21 RX ADMIN — APIXABAN 5 MILLIGRAM(S): 2.5 TABLET, FILM COATED ORAL at 06:59

## 2021-05-21 RX ADMIN — SODIUM CHLORIDE 75 MILLILITER(S): 9 INJECTION INTRAMUSCULAR; INTRAVENOUS; SUBCUTANEOUS at 01:09

## 2021-05-21 RX ADMIN — CHLORHEXIDINE GLUCONATE 1 APPLICATION(S): 213 SOLUTION TOPICAL at 13:00

## 2021-05-21 RX ADMIN — EZETIMIBE 10 MILLIGRAM(S): 10 TABLET ORAL at 12:38

## 2021-05-21 RX ADMIN — Medication 300 MILLIGRAM(S): at 12:38

## 2021-05-21 RX ADMIN — VALACYCLOVIR 1000 MILLIGRAM(S): 500 TABLET, FILM COATED ORAL at 06:59

## 2021-05-21 RX ADMIN — SODIUM CHLORIDE 75 MILLILITER(S): 9 INJECTION INTRAMUSCULAR; INTRAVENOUS; SUBCUTANEOUS at 12:43

## 2021-05-21 NOTE — PROGRESS NOTE ADULT - ASSESSMENT
80 year old female with history of ANCA vasculitis diagnosed 20 years (has been following with Rheumatology on Azathioprine) presented to [facility] for first cycle of chemotherapy for DLBCL.    #EBV+ DLBCL  - diagnosed 4/13/21 after presenting to the ED with SOB/CP found to have RML RLL PE and b/l LE DVT along with lung masses up to 8.6cm s/p VATS/biopsy confirming EBV+ DLBCL: VEENA+, positive for: CD20 CD79a Pax5 MUM1 Bcl2 CD43 varriable CD30, Negative: CD5 CD10 CD23 Cyclin D1 p53 cmyc, Ki67 >90%.  - Hepatitis panel and HIV negative   - TTE 4/21/21 69%   - s/p chemoport   - PET/CT 1. Status post wedge resection of left upper lobe lung nodule, as compared to CT dated 4/13/2021. Additional FDG-avid, partially necrotic bilateral lung masses and mildly avid groundglass opacities are not significantly changed as compared to prior CT, compatible with biopsy-proven lymphoma. 2. Few mildly FDG-avid, mildly enlarged mediastinal and bilateral hilar lymph nodes are nonspecific. 3. Previously seen indeterminate3.1 cm lesion in the upper pole of right kidney is FDG-avid.  4. FDG-avid left adrenal nodule is indeterminate.   - start prednisone 100mg daily x 5 days (Day 4/5)   - ensure patient is on PPI while on steroids   - continue allopurinol 300mg daily   - S/p chemotherapy as below- tolerated well:                        - Wednesday 5/19: cytoxan 750mg/m2, doxorubicin 50mg/m2, vincristine 1mg flat dose                        - Thursday 5/20: Rituximab 375mg/m2   - Fulphila appointment made for Saturday 5/22/21 at 1050am. Lab appointment for count check made for Wednesday 5/24 at 3pm.   - 5/20/21 s/p IT MTX 12mg. CSF flow pending  - can resume Eliquis today   - MRI Abdomen result noted (right renal lesion decreased in size and left adrenal lesion remains indeterminate)   - TLS labs stable. Plan for discharge. If for some reason patient is not discharged can check TLS q12hr   - gentle IVF hydration given diastolic dysfunction     Vincent Novak MD   Hematology/Oncology Fellow   pager [phone]

## 2021-05-21 NOTE — DISCHARGE NOTE NURSING/CASE MANAGEMENT/SOCIAL WORK - PATIENT PORTAL LINK FT
You can access the FollowMyHealth Patient Portal offered by [facility] by registering at the following website: [email or web address]. By joining [facility]’s FollowMyHealth portal, you will also be able to view your health information using other applications (apps) compatible with our system.

## 2021-05-21 NOTE — PROGRESS NOTE ADULT - SUBJECTIVE AND OBJECTIVE BOX
INTERVAL HPI/OVERNIGHT EVENTS:  Patient S&E at bedside. No o/n events. Tolerating prednisone. Plan for chemotherapy today.     VITAL SIGNS:  T(F): 97.9 (05-19-21 @ 05:41)  HR: 70 (05-19-21 @ 05:41)  BP: 152/64 (05-19-21 @ 05:41)  RR: 18 (05-19-21 @ 05:41)  SpO2: 99% (05-19-21 @ 05:41)  Wt(kg): --    PHYSICAL EXAM:    Constitutional: NAD  Eyes: EOMI, sclera non-icteric  Neck: supple  Respiratory: CTAB, no wheezes or crackles   Cardiovascular: RRR  Gastrointestinal: soft, NTND, + BS  Extremities: no cyanosis, clubbing or edema   Neurological: awake and alert      MEDICATIONS  (STANDING):  allopurinol 300 milliGRAM(s) Oral daily  chlorhexidine 2% Cloths 1 Application(s) Topical daily  citalopram 10 milliGRAM(s) Oral daily  ezetimibe 10 milliGRAM(s) Oral daily  heparin  Infusion.  Unit(s)/Hr (12 mL/Hr) IV Continuous <Continuous>  lidocaine/prilocaine Cream 1 Application(s) Topical once  pantoprazole    Tablet 40 milliGRAM(s) Oral before breakfast  predniSONE   Tablet 100 milliGRAM(s) Oral daily  sodium chloride 0.9%. 1000 milliLiter(s) (75 mL/Hr) IV Continuous <Continuous>  valACYclovir 1000 milliGRAM(s) Oral two times a day    MEDICATIONS  (PRN):  acetaminophen   Tablet .. 325 milliGRAM(s) Oral every 6 hours PRN Mild Pain (1 - 3)  heparin   Injectable 5500 Unit(s) IV Push every 6 hours PRN For aPTT less than 40  heparin   Injectable 2500 Unit(s) IV Push every 6 hours PRN For aPTT between 40 - 57  ondansetron Injectable 4 milliGRAM(s) IV Push every 6 hours PRN Nausea      Allergies    codeine (Nausea)  penicillin (Hives)    Intolerances        LABS:                        11.4   13.14 )-----------( 253      ( 19 May 2021 05:47 )             35.4     05-19    141  |  109<H>  |  16  ----------------------------<  107<H>  3.6   |  20<L>  |  0.75    Ca    8.7      19 May 2021 05:47  Phos  2.3     05-19  Mg     2.2     05-19    TPro  6.4  /  Alb  3.3  /  TBili  0.3  /  DBili  x   /  AST  24  /  ALT  12  /  AlkPhos  62  05-19    PT/INR - ( 17 May 2021 11:32 )   PT: 20.4 sec;   INR: 1.84 ratio         PTT - ( 19 May 2021 05:47 )  PTT:148.8 sec      RADIOLOGY & ADDITIONAL TESTS:  Studies reviewed.  
INTERVAL HPI/OVERNIGHT EVENTS:  Patient S&E at bedside. No o/n events. S/P IT MTX and Rituximab yesterday, tolerated well. To resume ELiquis today and discharged to follow up outpatient.     VITAL SIGNS:  T(F): 98 (05-21-21 @ 06:56)  HR: 74 (05-21-21 @ 06:56)  BP: 160/75 (05-21-21 @ 06:56)  RR: 17 (05-21-21 @ 06:56)  SpO2: 99% (05-21-21 @ 06:56)  Wt(kg): --    PHYSICAL EXAM:    Constitutional: NAD  Eyes: EOMI, sclera non-icteric  Neck: supple  Respiratory: CTAB, no wheezes or crackles   Cardiovascular: RRR  Gastrointestinal: soft, NTND, + BS  Extremities: no cyanosis, clubbing or edema   Neurological: awake and alert      MEDICATIONS  (STANDING):  allopurinol 300 milliGRAM(s) Oral daily  apixaban 5 milliGRAM(s) Oral two times a day  chlorhexidine 2% Cloths 1 Application(s) Topical daily  citalopram 10 milliGRAM(s) Oral daily  ezetimibe 10 milliGRAM(s) Oral daily  methotrexate PF IntraThecal (eMAR) 12 milliGRAM(s) IntraThecal once  pantoprazole    Tablet 40 milliGRAM(s) Oral before breakfast  predniSONE   Tablet 100 milliGRAM(s) Oral daily  sodium chloride 0.9%. 1000 milliLiter(s) (75 mL/Hr) IV Continuous <Continuous>  valACYclovir 1000 milliGRAM(s) Oral two times a day    MEDICATIONS  (PRN):  acetaminophen   Tablet .. 325 milliGRAM(s) Oral every 6 hours PRN Mild Pain (1 - 3)  diphenhydrAMINE   Injectable 50 milliGRAM(s) IV Push once PRN PRN Chemotherapy Reaction  hydrocortisone sodium succinate Injectable 50 milliGRAM(s) IV Push once PRN PRN Chemotherapy Reaction  meperidine     Injectable 25 milliGRAM(s) IV Push once PRN PRN Chemotherapy Reaction ([place])  metoclopramide Injectable 10 milliGRAM(s) IV Push every 8 hours PRN N/V  ondansetron Injectable 4 milliGRAM(s) IV Push every 6 hours PRN Nausea      Allergies    codeine (Nausea)  penicillin (Hives)    Intolerances        LABS:                        12.0   9.36  )-----------( 268      ( 21 May 2021 10:06 )             36.7     05-21    138  |  108<H>  |  14  ----------------------------<  207<H>  3.3<L>   |  19<L>  |  0.60    Ca    8.7      21 May 2021 10:06  Phos  2.9     05-21  Mg     2.0     05-21    TPro  6.5  /  Alb  3.4  /  TBili  0.5  /  DBili  x   /  AST  27  /  ALT  13  /  AlkPhos  63  05-21    PT/INR - ( 20 May 2021 14:41 )   PT: 12.1 sec;   INR: 1.05 ratio         PTT - ( 20 May 2021 14:41 )  PTT:21.4 sec      RADIOLOGY & ADDITIONAL TESTS:  Studies reviewed.  
INTERVAL HPI/OVERNIGHT EVENTS:  Patient S&E at bedside. No o/n events. No complaints. On prednisone 100mg. Plan to start chemotherapy tomorrow 5/19.     VITAL SIGNS:  T(F): 97.5 (05-18-21 @ 06:47)  HR: 69 (05-18-21 @ 06:47)  BP: 133/65 (05-18-21 @ 06:47)  RR: 17 (05-18-21 @ 06:47)  SpO2: 97% (05-18-21 @ 06:47)  Wt(kg): --    PHYSICAL EXAM:    Constitutional: NAD  Eyes: EOMI, sclera non-icteric  Neck: supple  Respiratory: CTAB, no wheezes or crackles   Cardiovascular: RRR  Gastrointestinal: soft, NTND, + BS  Extremities: no cyanosis, clubbing or edema   Neurological: awake and alert      MEDICATIONS  (STANDING):  allopurinol 300 milliGRAM(s) Oral daily  chlorhexidine 2% Cloths 1 Application(s) Topical daily  citalopram 10 milliGRAM(s) Oral daily  ezetimibe 10 milliGRAM(s) Oral daily  heparin  Infusion.  Unit(s)/Hr (12 mL/Hr) IV Continuous <Continuous>  pantoprazole    Tablet 40 milliGRAM(s) Oral before breakfast  predniSONE   Tablet 100 milliGRAM(s) Oral daily  sodium chloride 0.9%. 1000 milliLiter(s) (75 mL/Hr) IV Continuous <Continuous>  valACYclovir 1000 milliGRAM(s) Oral two times a day    MEDICATIONS  (PRN):  acetaminophen   Tablet .. 325 milliGRAM(s) Oral every 6 hours PRN Mild Pain (1 - 3)  heparin   Injectable 5500 Unit(s) IV Push every 6 hours PRN For aPTT less than 40  heparin   Injectable 2500 Unit(s) IV Push every 6 hours PRN For aPTT between 40 - 57  ondansetron Injectable 4 milliGRAM(s) IV Push every 6 hours PRN Nausea      Allergies    codeine (Nausea)  penicillin (Hives)    Intolerances        LABS:                        12.1   4.89  )-----------( 227      ( 18 May 2021 05:02 )             36.9     05-18    141  |  110<H>  |  12  ----------------------------<  153<H>  4.0   |  21<L>  |  0.63    Ca    9.0      18 May 2021 05:02  Phos  2.5     05-18  Mg     2.0     05-18    TPro  7.2  /  Alb  3.7  /  TBili  0.3  /  DBili  x   /  AST  28  /  ALT  11  /  AlkPhos  75  05-18    PT/INR - ( 17 May 2021 11:32 )   PT: 20.4 sec;   INR: 1.84 ratio         PTT - ( 18 May 2021 11:39 )  PTT:>200 sec      RADIOLOGY & ADDITIONAL TESTS:  Studies reviewed.  
INTERVAL HPI/OVERNIGHT EVENTS:  Patient S&E at bedside. No o/n events. Tolerated chemo well. Plan for IT MTX and Rituximab today.     VITAL SIGNS:  T(F): 97.5 (05-20-21 @ 06:17)  HR: 59 (05-20-21 @ 06:17)  BP: 142/58 (05-20-21 @ 06:17)  RR: 18 (05-20-21 @ 06:17)  SpO2: 98% (05-20-21 @ 06:17)  Wt(kg): --    PHYSICAL EXAM:    Constitutional: NAD  Eyes: EOMI, sclera non-icteric  Neck: supple  Respiratory: CTAB, no wheezes or crackles   Cardiovascular: RRR  Gastrointestinal: soft, NTND, + BS  Extremities: no cyanosis, clubbing or edema   Neurological: awake and alert      MEDICATIONS  (STANDING):  acetaminophen   Tablet .. 650 milliGRAM(s) Oral once  allopurinol 300 milliGRAM(s) Oral daily  chlorhexidine 2% Cloths 1 Application(s) Topical daily  citalopram 10 milliGRAM(s) Oral daily  diphenhydrAMINE   Injectable 50 milliGRAM(s) IV Push once  ezetimibe 10 milliGRAM(s) Oral daily  methotrexate PF IntraThecal (eMAR) 12 milliGRAM(s) IntraThecal once  pantoprazole    Tablet 40 milliGRAM(s) Oral before breakfast  predniSONE   Tablet 100 milliGRAM(s) Oral daily  riTUXimab-pvvr (RUXIENCE) IVPB (eMAR) 622 milliGRAM(s) IV Intermittent once  sodium chloride 0.9%. 1000 milliLiter(s) (75 mL/Hr) IV Continuous <Continuous>  valACYclovir 1000 milliGRAM(s) Oral two times a day    MEDICATIONS  (PRN):  acetaminophen   Tablet .. 325 milliGRAM(s) Oral every 6 hours PRN Mild Pain (1 - 3)  diphenhydrAMINE   Injectable 50 milliGRAM(s) IV Push once PRN PRN Chemotherapy Reaction  hydrocortisone sodium succinate Injectable 50 milliGRAM(s) IV Push once PRN PRN Chemotherapy Reaction  metoclopramide Injectable 10 milliGRAM(s) IV Push every 8 hours PRN N/V  ondansetron Injectable 4 milliGRAM(s) IV Push every 6 hours PRN Nausea      Allergies    codeine (Nausea)  penicillin (Hives)    Intolerances        LABS:                        12.1   13.05 )-----------( 318      ( 20 May 2021 06:29 )             36.8     05-20    141  |  110<H>  |  15  ----------------------------<  83  3.7   |  20<L>  |  0.69    Ca    8.7      20 May 2021 06:29  Phos  2.2     05-20  Mg     2.0     05-20    TPro  6.9  /  Alb  3.6  /  TBili  0.4  /  DBili  x   /  AST  26  /  ALT  14  /  AlkPhos  67  05-20    PTT - ( 20 May 2021 06:29 )  PTT:59.5 sec      RADIOLOGY & ADDITIONAL TESTS:  Studies reviewed.  
Patient is a 80y old  Female who presents with a chief complaint of chemo (19 May 2021 10:30)      INTERVAL HPI/OVERNIGHT EVENTS: noted  pt seen and examined this am   events noted  feels well, no new complaints      Vital Signs Last 24 Hrs  T(C): 36.6 (19 May 2021 05:41), Max: 36.7 (18 May 2021 15:51)  T(F): 97.9 (19 May 2021 05:41), Max: 98 (18 May 2021 15:51)  HR: 70 (19 May 2021 05:41) (60 - 78)  BP: 152/64 (19 May 2021 05:41) (126/86 - 152/64)  BP(mean): --  RR: 18 (19 May 2021 05:41) (18 - 18)  SpO2: 99% (19 May 2021 05:41) (97% - 99%)    acetaminophen   Tablet .. 325 milliGRAM(s) Oral every 6 hours PRN  allopurinol 300 milliGRAM(s) Oral daily  chlorhexidine 2% Cloths 1 Application(s) Topical daily  citalopram 10 milliGRAM(s) Oral daily  cyclophosphamide IVPB (eMAR) 1245 milliGRAM(s) IV Intermittent once  DOXOrubicin Injectable (eMAR) 83 milliGRAM(s) IV Push Chemo once  ezetimibe 10 milliGRAM(s) Oral daily  fosaprepitant IVPB 150 milliGRAM(s) IV Intermittent once  heparin   Injectable 5500 Unit(s) IV Push every 6 hours PRN  heparin   Injectable 2500 Unit(s) IV Push every 6 hours PRN  heparin  Infusion.  Unit(s)/Hr IV Continuous <Continuous>  lidocaine/prilocaine Cream 1 Application(s) Topical once  metoclopramide Injectable 10 milliGRAM(s) IV Push every 8 hours PRN  ondansetron Injectable 4 milliGRAM(s) IV Push every 6 hours PRN  palonosetron Injectable 0.25 milliGRAM(s) IV Push once  pantoprazole    Tablet 40 milliGRAM(s) Oral before breakfast  predniSONE   Tablet 100 milliGRAM(s) Oral daily  sodium chloride 0.9%. 1000 milliLiter(s) IV Continuous <Continuous>  valACYclovir 1000 milliGRAM(s) Oral two times a day  vinCRIStine IVPB (eMAR) 1 milliGRAM(s) IV Intermittent once      PHYSICAL EXAM:  GENERAL: NAD,   EYES: conjunctiva and sclera clear  ENMT: Moist mucous membranes  NECK: Supple, No JVD, Normal thyroid  CHEST/LUNG: non labored, cta b/l  HEART: Regular rate and rhythm; No murmurs, rubs, or gallops  ABDOMEN: Soft, Nontender, Nondistended; Bowel sounds present  EXTREMITIES:  2+ Peripheral Pulses, No clubbing, cyanosis, or edema  LYMPH: No lymphadenopathy noted  SKIN: No rashes or lesions    Consultant(s) Notes Reviewed:  [x ] YES  [ ] NO  Care Discussed with Consultants/Other Providers [ x] YES  [ ] NO    LABS:                        11.4   13.14 )-----------( 253      ( 19 May 2021 05:47 )             35.4     05-19    141  |  109<H>  |  16  ----------------------------<  107<H>  3.6   |  20<L>  |  0.75    Ca    8.7      19 May 2021 05:47  Phos  2.3     05-19  Mg     2.2     05-19    TPro  6.4  /  Alb  3.3  /  TBili  0.3  /  DBili  x   /  AST  24  /  ALT  12  /  AlkPhos  62  05-19    PTT - ( 19 May 2021 05:47 )  PTT:148.8 sec    CAPILLARY BLOOD GLUCOSE                  RADIOLOGY & ADDITIONAL TESTS:    Imaging Personally Reviewed:  [x ] YES  [ ] NO
Patient is a 80y old  Female who presents with a chief complaint of chemo (20 May 2021 17:04)      INTERVAL HPI/OVERNIGHT EVENTS: noted  pt seen and examined this am   events noted  feels well  denies cp/sob/n/v/abd pain      Vital Signs Last 24 Hrs  T(C): 36.2 (20 May 2021 21:21), Max: 36.7 (20 May 2021 10:47)  T(F): 97.2 (20 May 2021 21:21), Max: 98 (20 May 2021 10:47)  HR: 66 (20 May 2021 21:21) (59 - 77)  BP: 165/58 (20 May 2021 21:21) (129/68 - 177/69)  BP(mean): --  RR: 18 (20 May 2021 21:21) (16 - 18)  SpO2: 97% (20 May 2021 21:21) (97% - 100%)    acetaminophen   Tablet .. 325 milliGRAM(s) Oral every 6 hours PRN  allopurinol 300 milliGRAM(s) Oral daily  chlorhexidine 2% Cloths 1 Application(s) Topical daily  citalopram 10 milliGRAM(s) Oral daily  diphenhydrAMINE   Injectable 50 milliGRAM(s) IV Push once PRN  ezetimibe 10 milliGRAM(s) Oral daily  hydrocortisone sodium succinate Injectable 50 milliGRAM(s) IV Push once PRN  meperidine     Injectable 25 milliGRAM(s) IV Push once PRN  methotrexate PF IntraThecal (eMAR) 12 milliGRAM(s) IntraThecal once  metoclopramide Injectable 10 milliGRAM(s) IV Push every 8 hours PRN  ondansetron Injectable 4 milliGRAM(s) IV Push every 6 hours PRN  pantoprazole    Tablet 40 milliGRAM(s) Oral before breakfast  predniSONE   Tablet 100 milliGRAM(s) Oral daily  sodium chloride 0.9%. 1000 milliLiter(s) IV Continuous <Continuous>  valACYclovir 1000 milliGRAM(s) Oral two times a day      PHYSICAL EXAM:  GENERAL: NAD,   EYES: conjunctiva and sclera clear  ENMT: Moist mucous membranes  NECK: Supple, No JVD, Normal thyroid  CHEST/LUNG: non labored, cta b/l  HEART: Regular rate and rhythm; No murmurs, rubs, or gallops  ABDOMEN: Soft, Nontender, Nondistended; Bowel sounds present  EXTREMITIES:  2+ Peripheral Pulses, No clubbing, cyanosis, or edema  LYMPH: No lymphadenopathy noted  SKIN: No rashes or lesions    Consultant(s) Notes Reviewed:  [x ] YES  [ ] NO  Care Discussed with Consultants/Other Providers [ x] YES  [ ] NO    LABS:                        12.1   13.05 )-----------( 318      ( 20 May 2021 06:29 )             36.8     05-20    140  |  108<H>  |  16  ----------------------------<  114<H>  3.7   |  23  |  0.63    Ca    8.5      20 May 2021 17:28  Phos  2.5     05-20  Mg     2.0     05-20    TPro  6.4  /  Alb  3.3  /  TBili  0.4  /  DBili  x   /  AST  25  /  ALT  16  /  AlkPhos  62  05-20    PT/INR - ( 20 May 2021 14:41 )   PT: 12.1 sec;   INR: 1.05 ratio         PTT - ( 20 May 2021 14:41 )  PTT:21.4 sec    CAPILLARY BLOOD GLUCOSE                  RADIOLOGY & ADDITIONAL TESTS:    Imaging Personally Reviewed:  [x ] YES  [ ] NO
SUBJECTIVE / OVERNIGHT EVENTS:  Pt seen and examined at bedside.   No overnight event.  Feeling better.  no cp, no sob, no n/v/d.   in good spirit   denied pain, ambulating around         --------------------------------------------------------------------------------------------  LABS:                        12.1   4.89  )-----------( 227      ( 18 May 2021 05:02 )             36.9     05-18    139  |  107  |  18  ----------------------------<  165<H>  3.8   |  19<L>  |  0.91    Ca    8.8      18 May 2021 22:17  Phos  2.6     05-18  Mg     2.0     05-18    TPro  6.6  /  Alb  3.3  /  TBili  0.3  /  DBili  x   /  AST  24  /  ALT  12  /  AlkPhos  63  05-18    PT/INR - ( 17 May 2021 11:32 )   PT: 20.4 sec;   INR: 1.84 ratio         PTT - ( 18 May 2021 19:44 )  PTT:163.6 sec  CAPILLARY BLOOD GLUCOSE                RADIOLOGY & ADDITIONAL TESTS:    Imaging Personally Reviewed:  [x] YES  [ ] NO    Consultant(s) Notes Reviewed:  [x] YES  [ ] NO    MEDICATIONS  (STANDING):  allopurinol 300 milliGRAM(s) Oral daily  chlorhexidine 2% Cloths 1 Application(s) Topical daily  citalopram 10 milliGRAM(s) Oral daily  ezetimibe 10 milliGRAM(s) Oral daily  heparin  Infusion.  Unit(s)/Hr (12 mL/Hr) IV Continuous <Continuous>  pantoprazole    Tablet 40 milliGRAM(s) Oral before breakfast  predniSONE   Tablet 100 milliGRAM(s) Oral daily  sodium chloride 0.9%. 1000 milliLiter(s) (75 mL/Hr) IV Continuous <Continuous>  valACYclovir 1000 milliGRAM(s) Oral two times a day    MEDICATIONS  (PRN):  acetaminophen   Tablet .. 325 milliGRAM(s) Oral every 6 hours PRN Mild Pain (1 - 3)  heparin   Injectable 5500 Unit(s) IV Push every 6 hours PRN For aPTT less than 40  heparin   Injectable 2500 Unit(s) IV Push every 6 hours PRN For aPTT between 40 - 57  ondansetron Injectable 4 milliGRAM(s) IV Push every 6 hours PRN Nausea      Care Discussed with Consultants/Other Providers [x] YES  [ ] NO    Vital Signs Last 24 Hrs  T(C): 36.4 (18 May 2021 21:15), Max: 36.7 (18 May 2021 15:51)  T(F): 97.5 (18 May 2021 21:15), Max: 98 (18 May 2021 15:51)  HR: 78 (18 May 2021 21:15) (60 - 78)  BP: 139/90 (18 May 2021 21:15) (126/86 - 139/90)  BP(mean): --  RR: 18 (18 May 2021 21:15) (17 - 18)  SpO2: 97% (18 May 2021 21:15) (97% - 99%)  I&O's Summary      PHYSICAL EXAM:  GENERAL: NAD, well-developed, comfortable  HEAD:  Atraumatic, Normocephalic  EYES: EOMI, PERRLA, conjunctiva and sclera clear  NECK: Supple, No JVD  CHEST/LUNG: Clear to auscultation bilaterally; No wheeze  HEART: Regular rate and rhythm; No murmurs, rubs, or gallops  ABDOMEN: Soft, Nontender, Nondistended; Bowel sounds present  Neuro: AAOx3, no focal weakness, 5/5 b/l extremity strength  EXTREMITIES:  2+ Peripheral Pulses, No clubbing, cyanosis, or edema  SKIN: No rashes or lesions

## 2021-05-21 NOTE — CHART NOTE - NSCHARTNOTEFT_GEN_A_CORE
Patient cleared for discharge per discussion with hematology Dr. Novak, for d/c recommend antiemetic prn and valcyclovir ppx, no need to continue allopurinol. Pt medically cleared for discharge per Dr. Robertson.

## 2021-05-22 ENCOUNTER — APPOINTMENT (OUTPATIENT)
Dept: INFUSION THERAPY | Facility: HOSPITAL | Age: 81
End: 2021-05-22

## 2021-05-24 DIAGNOSIS — C83.30 DIFFUSE LARGE B-CELL LYMPHOMA, UNSPECIFIED SITE: ICD-10-CM

## 2021-05-24 DIAGNOSIS — Z51.89 ENCOUNTER FOR OTHER SPECIFIED AFTERCARE: ICD-10-CM

## 2021-05-24 DIAGNOSIS — C85.90 NON-HODGKIN LYMPHOMA, UNSPECIFIED, UNSPECIFIED SITE: ICD-10-CM

## 2021-05-24 LAB — TM INTERPRETATION: SIGNIFICANT CHANGE UP

## 2021-05-26 ENCOUNTER — RESULT REVIEW (OUTPATIENT)
Age: 81
End: 2021-05-26

## 2021-05-26 ENCOUNTER — APPOINTMENT (OUTPATIENT)
Dept: HEMATOLOGY ONCOLOGY | Facility: CLINIC | Age: 81
End: 2021-05-26

## 2021-05-26 LAB
BASOPHILS # BLD AUTO: 0.01 K/UL — SIGNIFICANT CHANGE UP (ref 0–0.2)
BASOPHILS NFR BLD AUTO: 1 % — SIGNIFICANT CHANGE UP (ref 0–2)
EOSINOPHIL # BLD AUTO: 0.17 K/UL — SIGNIFICANT CHANGE UP (ref 0–0.5)
EOSINOPHIL NFR BLD AUTO: 16 % — HIGH (ref 0–6)
HCT VFR BLD CALC: 34.3 % — LOW (ref 34.5–45)
HGB BLD-MCNC: 11.7 G/DL — SIGNIFICANT CHANGE UP (ref 11.5–15.5)
LYMPHOCYTES # BLD AUTO: 0.87 K/UL — LOW (ref 1–3.3)
LYMPHOCYTES # BLD AUTO: 80 % — HIGH (ref 13–44)
MCHC RBC-ENTMCNC: 34 PG — SIGNIFICANT CHANGE UP (ref 27–34)
MCHC RBC-ENTMCNC: 34.1 G/DL — SIGNIFICANT CHANGE UP (ref 32–36)
MCV RBC AUTO: 99.7 FL — SIGNIFICANT CHANGE UP (ref 80–100)
MONOCYTES # BLD AUTO: 0 K/UL — SIGNIFICANT CHANGE UP (ref 0–0.9)
MONOCYTES NFR BLD AUTO: 0 % — LOW (ref 2–14)
NEUTROPHILS # BLD AUTO: 0.03 K/UL — LOW (ref 1.8–7.4)
NEUTROPHILS NFR BLD AUTO: 3 % — LOW (ref 43–77)
NRBC # BLD: 0 /100 — SIGNIFICANT CHANGE UP (ref 0–0)
NRBC # BLD: SIGNIFICANT CHANGE UP /100 WBCS (ref 0–0)
PLAT MORPH BLD: NORMAL — SIGNIFICANT CHANGE UP
PLATELET # BLD AUTO: 123 K/UL — LOW (ref 150–400)
RBC # BLD: 3.44 M/UL — LOW (ref 3.8–5.2)
RBC # FLD: 13.8 % — SIGNIFICANT CHANGE UP (ref 10.3–14.5)
RBC BLD AUTO: SIGNIFICANT CHANGE UP
WBC # BLD: 1.09 K/UL — LOW (ref 3.8–10.5)
WBC # FLD AUTO: 1.09 K/UL — LOW (ref 3.8–10.5)

## 2021-05-27 ENCOUNTER — APPOINTMENT (OUTPATIENT)
Dept: HEMATOLOGY ONCOLOGY | Facility: CLINIC | Age: 81
End: 2021-05-27
Payer: MEDICARE

## 2021-05-27 VITALS
TEMPERATURE: 98.5 F | SYSTOLIC BLOOD PRESSURE: 120 MMHG | HEART RATE: 78 BPM | OXYGEN SATURATION: 98 % | DIASTOLIC BLOOD PRESSURE: 80 MMHG | WEIGHT: 144 LBS | RESPIRATION RATE: 15 BRPM | BODY MASS INDEX: 26.34 KG/M2

## 2021-05-27 PROCEDURE — 85025 COMPLETE CBC W/AUTO DIFF WBC: CPT | Mod: GC

## 2021-05-27 PROCEDURE — 99072 ADDL SUPL MATRL&STAF TM PHE: CPT

## 2021-05-27 PROCEDURE — 99214 OFFICE O/P EST MOD 30 MIN: CPT | Mod: GC

## 2021-05-27 RX ORDER — EZETIMIBE 10 MG/1
10 TABLET ORAL
Refills: 0 | Status: ACTIVE | COMMUNITY
Start: 2021-05-27

## 2021-05-27 RX ORDER — CEFDINIR 300 MG/1
300 CAPSULE ORAL
Qty: 14 | Refills: 0 | Status: DISCONTINUED | COMMUNITY
Start: 2021-05-27 | End: 2021-05-27

## 2021-05-28 LAB
ALBUMIN SERPL ELPH-MCNC: 3.9 G/DL
ALP BLD-CCNC: 75 U/L
ALT SERPL-CCNC: 9 U/L
ANION GAP SERPL CALC-SCNC: 16 MMOL/L
AST SERPL-CCNC: 18 U/L
BILIRUB SERPL-MCNC: 0.6 MG/DL
BUN SERPL-MCNC: 20 MG/DL
CALCIUM SERPL-MCNC: 9.7 MG/DL
CHLORIDE SERPL-SCNC: 102 MMOL/L
CO2 SERPL-SCNC: 20 MMOL/L
CREAT SERPL-MCNC: 0.85 MG/DL
GLUCOSE SERPL-MCNC: 108 MG/DL
LDH SERPL-CCNC: 234 U/L
MAGNESIUM SERPL-MCNC: 2.1 MG/DL
PHOSPHATE SERPL-MCNC: 3.3 MG/DL
POTASSIUM SERPL-SCNC: 4.5 MMOL/L
PROT SERPL-MCNC: 6.7 G/DL
SODIUM SERPL-SCNC: 138 MMOL/L
URATE SERPL-MCNC: 3.4 MG/DL

## 2021-06-02 ENCOUNTER — APPOINTMENT (OUTPATIENT)
Dept: HEMATOLOGY ONCOLOGY | Facility: CLINIC | Age: 81
End: 2021-06-02
Payer: MEDICARE

## 2021-06-02 VITALS
RESPIRATION RATE: 15 BRPM | WEIGHT: 146 LBS | HEART RATE: 68 BPM | OXYGEN SATURATION: 98 % | BODY MASS INDEX: 26.7 KG/M2 | DIASTOLIC BLOOD PRESSURE: 72 MMHG | SYSTOLIC BLOOD PRESSURE: 110 MMHG

## 2021-06-02 DIAGNOSIS — Z85.72 PERSONAL HISTORY OF NON-HODGKIN LYMPHOMAS: ICD-10-CM

## 2021-06-02 PROCEDURE — 85025 COMPLETE CBC W/AUTO DIFF WBC: CPT | Mod: GC

## 2021-06-02 PROCEDURE — 99214 OFFICE O/P EST MOD 30 MIN: CPT | Mod: GC

## 2021-06-02 PROCEDURE — 99072 ADDL SUPL MATRL&STAF TM PHE: CPT

## 2021-06-02 NOTE — ASSESSMENT
[FreeTextEntry1] : Ms. Magallon is a 79 yo female with PMHx of  ANCA vasculitis (dx 20 years ago, been on Azathioprine since then, being followed by Rheum outpt) who presents today for initial consultation for newly Diagnosed Diffuse Large B cell Lymphoma. Pt presented tot he ER on 4/13/21 with chest pain and exertional shortness of breath. Patient reported recent travel to [place] in car (~10 hrs ride). She had a CT angio done which showed right lower lobe and middle lobe pulmonary arterial emboli. She was noted to have B/l LE DVTs - she was started on heparin and switched to Eliquis. Pt need to have chest tube placement as well, discharged with home O2.\par \par In addition pt was found to have large b/l lung masses measuring up to 8.6cm. \par Pulm was consulted and pt underwent biopsy/VATs on 4/16/21, path showed  EBV+ diffuse large B-cell lymphoma. Lymphoma cells are B cells, positive VEENA, CD20, CD79a, Pax5 (dim), Mum-1, Bcl2, CD43,variable CD30, negative CD5, CD10, CD23, cyclin D1, p53 (<5%), cMYC (<20%). Ki-67 proliferation index is high (>90%). CD3+ T cells comprise a minor population in the infiltrate.  stains few plasma cells; B cells are lambda-restricted. \par \par Patient is s/p  CHOP on 5/19,  Rituxan and IT MTX on 5/20 at [facility].\par \par \par #Diffuse Large B Cell Lymphoma EBV positive\par - Discussed with patient and her sister in law the nature of the disease. We discussed the need to start treatment with chemotherapy with RCHOP for curative intent. Went over the risks and side effects of the regimen including - infusion reactions, lower of blood counts, infections, reactivation of Hep B (labs sent out today). Pt agrees to therapy and consent was signed. Info on chemo was printed and given to patient.\par -PET/CT 5/10/21: Status post wedge resection of left upper lobe lung nodule, as compared to CT dated 4/13/2021. Additional FDG-avid, partially necrotic bilateral lung masses and mildly avid groundglass opacities are not significantly changed as compared to prior CT, compatible with biopsy-proven lymphoma. 2. Few mildly FDG-avid, mildly enlarged mediastinal and bilateral hilar lymph nodes are nonspecific. 3. Previously seen indeterminate 3.1 cm lesion in the upper pole of right kidney s FDG-avid. 4. FDG-avid left adrenal nodule is indeterminate. \par -MRI 5/17/21: Right upper pole renal lesion significantly decreased in size of uncertain etiology. A 1.3 cm left adrenal nodule remains indeterminate.\par -s/p C1 RCHOP with IT MTX on 5/19 and 5/20\par - Echo was done inpatient on 4/21/21 with an EF of 69%\par -C2 RCHOP at [facility] on 6/10/21\par -CBC reviewed today:  ANC improved, 4.34. all other counts remain stable\par -start twice weekly blood draws at home\par -c/w Magic Mouth wash for mouth sores. \par -c/w valtrex\par \par #PE/DVT\par - Pt is currently on Eliquis\par -would continue until 6 months after CR from DLBCL \par \par #Right Kidney lesion\par - Pt was noted to have a hypodense lesion on right upper pole on CT done 4/13/21\par - consider MRI after PET/CT for further characterization\par \par #ANCA positive vasculitis \par -hold azathioprine\par -discussed case with Dr. Romano- rituximab should maintain her remission and will monitor symptoms after completion of therapy \par -monitor for symptoms of recurrence (bone/back pain) \par \par Follow up after treatment, sooner PRN

## 2021-06-02 NOTE — HISTORY OF PRESENT ILLNESS
[de-identified] : Ms. Magallon is a 79 yo female with PMHx of ANCA vasculitis (dx 20 years ago, been on Azathioprine since then, being followed by Rheum outpt) who presents today for initial consultation for newly Diagnosed Diffuse Large B cell Lymphoma. Pt noticed earlier this year that she had more SOB with activity and fatigue. She did not have night sweats, fever/chills or weight loss with this fatigue.\par Pt presented tot he ER on 4/13/21 with chest pain lasting and exertional shortness of breath. Patient reported recent travel to [place] in car (~10 hrs ride). She had a CT angio done which showed right lower lobe and middle lobe pulmonary arterial emboli. She was noted to have B/l LE DVTs - she was started on heparin and switched to Eliquis. Pt need to have chest tuble placement as well and was d/c on O2 therapy. Pt states she used O2 for the first few days at home but does not currently use it. O2 stats at home have been in the 90s\par In addition pt was found to have large b/l lung masses measuring up to 8.6cm. \par Pulm was consulted and pt underwent biopsy/VATs on 4/16/21, path showed  EBV+ diffuse large B-cell lymphoma. Lymphoma cells are B cells, positive VEENA, CD20, CD79a, Pax5 (dim), Mum-1, Bcl2, CD43,variable CD30, negative CD5, CD10, CD23, cyclin D1, p53 (<5%), cMYC (<20%). Ki-67 proliferation index is high (>90%). CD3+ T cells comprise a minor population in the infiltrate.  stains few plasma cells; B cells are lambda-restricted. Cam 5.2, P40, TTF-1, synaptophysin, chromogranin, PAX-8 and MALU-3 were performed on block 2C. They are negative in lymphoma cells.\par \par She received Cycle 1 of R-CHOP on 5/20/21 while admitted to monitor her respiratory function. She had a LP with IT MTX which did not demonstrate involvement with lymphoma. \par \par \par  [de-identified] : S/p  CHOP on 5/19,  Rituxan and IT MTX on 5/20 at [facility]. \par 6/2/2021: Since last visit, she is feeling better. Fatiue is overall stable. Her mouth sores have imrpove with magic mouth wash. No more ear pressure and fullness. No fevers/chills. No drenching sweats. Appetite is okay, not the best. Weight stable.No abd pain. No N/V/D. No numbness/tingling. No CP. Breathing has improved. No swelling. No bleeding/easy bruising. No dysuria. Constipation has improved. \par

## 2021-06-02 NOTE — RESULTS/DATA
[FreeTextEntry1] : CBC 6/2/21: reviewed\par wbc - 14.8\par hgb - 11.7\par plt - 192.0\par anc - 4.34\par alc - 3.68\par \par Final Diagnosis\par 1. 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 Suggest correlation with clinical\par and therapeutic history for underlying immune deficiency; test\par for EBV viral load is recommended.\par \par Microscopic description: Sections show dense lymphoid infiltrate\par with necrosis; predominantly medium-large sized cells forming\par larger clusters and sheets. Angiocentric and angiodestructive\par pattern is noted; confirmed by elastin stain on block 2B.\par Immunohistochemical stains for CD3, CD20, CD79a, Pax5, ,\par Bcl-2, Bcl-6, Mum-1, CD5, CD10, CD23, CD30, CD43, cyclin D1,\par cMYC, p53, and Ki-67, AE1/AE3,  in situ hybridization for\par Renay-Barr virus-\par -encoded small RNA (VEENA), kappa and lambda\par light chains were performed on block 2B. Lymphoma cells are B\par cells, positive VEENA, CD20, CD79a, Pax5 (dim), Mum-1, Bcl2, CD43,\par variable CD30, negative CD5, CD10, CD23, cyclin D1, p53 (<5%),\par cMYC (<20%). Ki-67 proliferation index is high (>90%). CD3+ T\par cells comprise a minor population in the infiltrate.  stains\par few plasma cells; B cells are lambda-restricted.\par Cam 5.2, P40, TTF-1, synaptohysing, chromogranin, PAX-8 and MALU-\par 3 were performed on block 2C. They are negative in lymphoma\par cells.\par \par

## 2021-06-02 NOTE — REVIEW OF SYSTEMS
[Fatigue] : fatigue [Recent Change In Weight] : ~T recent weight change [Shortness Of Breath] : shortness of breath [Joint Pain] : joint pain [Negative] : Allergic/Immunologic [FreeTextEntry6] : SOB improved since start of treatment [FreeTextEntry7] : Reflux

## 2021-06-02 NOTE — PHYSICAL EXAM
[Restricted in physically strenuous activity but ambulatory and able to carry out work of a light or sedentary nature] : Status 1- Restricted in physically strenuous activity but ambulatory and able to carry out work of a light or sedentary nature, e.g., light house work, office work [Ulcers] : ulcers [Normal] : affect appropriate [de-identified] : mouth sores impoved [de-identified] : healing left chest wound

## 2021-06-03 LAB
ALBUMIN SERPL ELPH-MCNC: 3.9 G/DL
ALP BLD-CCNC: 112 U/L
ALT SERPL-CCNC: 12 U/L
ANION GAP SERPL CALC-SCNC: 17 MMOL/L
AST SERPL-CCNC: 29 U/L
BILIRUB SERPL-MCNC: 0.2 MG/DL
BUN SERPL-MCNC: 16 MG/DL
CALCIUM SERPL-MCNC: 9.5 MG/DL
CHLORIDE SERPL-SCNC: 106 MMOL/L
CO2 SERPL-SCNC: 19 MMOL/L
CREAT SERPL-MCNC: 0.93 MG/DL
GLUCOSE SERPL-MCNC: 69 MG/DL
LDH SERPL-CCNC: 426 U/L
MAGNESIUM SERPL-MCNC: 2 MG/DL
PHOSPHATE SERPL-MCNC: 3 MG/DL
POTASSIUM SERPL-SCNC: 4.5 MMOL/L
PROT SERPL-MCNC: 6.3 G/DL
SODIUM SERPL-SCNC: 142 MMOL/L
URATE SERPL-MCNC: 5.4 MG/DL

## 2021-06-05 LAB
CULTURE RESULTS: SIGNIFICANT CHANGE UP
SPECIMEN SOURCE: SIGNIFICANT CHANGE UP

## 2021-06-10 ENCOUNTER — APPOINTMENT (OUTPATIENT)
Dept: INFUSION THERAPY | Facility: HOSPITAL | Age: 81
End: 2021-06-10

## 2021-06-10 ENCOUNTER — RESULT REVIEW (OUTPATIENT)
Age: 81
End: 2021-06-10

## 2021-06-10 LAB
BASOPHILS # BLD AUTO: 0.08 K/UL — SIGNIFICANT CHANGE UP (ref 0–0.2)
BASOPHILS NFR BLD AUTO: 1 % — SIGNIFICANT CHANGE UP (ref 0–2)
EOSINOPHIL # BLD AUTO: 0.08 K/UL — SIGNIFICANT CHANGE UP (ref 0–0.5)
EOSINOPHIL NFR BLD AUTO: 1 % — SIGNIFICANT CHANGE UP (ref 0–6)
HCT VFR BLD CALC: 34.7 % — SIGNIFICANT CHANGE UP (ref 34.5–45)
HGB BLD-MCNC: 11.7 G/DL — SIGNIFICANT CHANGE UP (ref 11.5–15.5)
LYMPHOCYTES # BLD AUTO: 1.73 K/UL — SIGNIFICANT CHANGE UP (ref 1–3.3)
LYMPHOCYTES # BLD AUTO: 22 % — SIGNIFICANT CHANGE UP (ref 13–44)
MCHC RBC-ENTMCNC: 33.7 G/DL — SIGNIFICANT CHANGE UP (ref 32–36)
MCHC RBC-ENTMCNC: 34.1 PG — HIGH (ref 27–34)
MCV RBC AUTO: 101.2 FL — HIGH (ref 80–100)
MONOCYTES # BLD AUTO: 1.26 K/UL — HIGH (ref 0–0.9)
MONOCYTES NFR BLD AUTO: 16 % — HIGH (ref 2–14)
NEUTROPHILS # BLD AUTO: 4.72 K/UL — SIGNIFICANT CHANGE UP (ref 1.8–7.4)
NEUTROPHILS NFR BLD AUTO: 60 % — SIGNIFICANT CHANGE UP (ref 43–77)
NRBC # BLD: 0 /100 — SIGNIFICANT CHANGE UP (ref 0–0)
NRBC # BLD: SIGNIFICANT CHANGE UP /100 WBCS (ref 0–0)
PLAT MORPH BLD: NORMAL — SIGNIFICANT CHANGE UP
PLATELET # BLD AUTO: 444 K/UL — HIGH (ref 150–400)
RBC # BLD: 3.43 M/UL — LOW (ref 3.8–5.2)
RBC # FLD: 15.9 % — HIGH (ref 10.3–14.5)
RBC BLD AUTO: SIGNIFICANT CHANGE UP
WBC # BLD: 7.86 K/UL — SIGNIFICANT CHANGE UP (ref 3.8–10.5)
WBC # FLD AUTO: 7.86 K/UL — SIGNIFICANT CHANGE UP (ref 3.8–10.5)

## 2021-06-10 NOTE — PHYSICAL EXAM
[Restricted in physically strenuous activity but ambulatory and able to carry out work of a light or sedentary nature] : Status 1- Restricted in physically strenuous activity but ambulatory and able to carry out work of a light or sedentary nature, e.g., light house work, office work [Normal] : affect appropriate [Ulcers] : ulcers [de-identified] : +mouth sores. left ear: TM erythematous and buldging  [de-identified] : healing left chest wound

## 2021-06-10 NOTE — HISTORY OF PRESENT ILLNESS
[de-identified] : Ms. Magallon is a 81 yo female with PMHx of ANCA vasculitis (dx 20 years ago, been on Azathioprine since then, being followed by Rheum outpt) who presents today for initial consultation for newly Diagnosed Diffuse Large B cell Lymphoma. Pt noticed earlier this year that she had more SOB with activity and fatigue. She did not have night sweats, fever/chills or weight loss with this fatigue.\par Pt presented tot he ER on 4/13/21 with chest pain lasting and exertional shortness of breath. Patient reported recent travel to [place] in car (~10 hrs ride). She had a CT angio done which showed right lower lobe and middle lobe pulmonary arterial emboli. She was noted to have B/l LE DVTs - she was started on heparin and switched to Eliquis. Pt need to have chest tuble placement as well and was d/c on O2 therapy. Pt states she used O2 for the first few days at home but does not currently use it. O2 stats at home have been in the 90s\par In addition pt was found to have large b/l lung masses measuring up to 8.6cm. \par Pulm was consulted and pt underwent biopsy/VATs on 4/16/21, path showed  EBV+ diffuse large B-cell lymphoma. Lymphoma cells are B cells, positive VEENA, CD20, CD79a, Pax5 (dim), Mum-1, Bcl2, CD43,variable CD30, negative CD5, CD10, CD23, cyclin D1, p53 (<5%), cMYC (<20%). Ki-67 proliferation index is high (>90%). CD3+ T cells comprise a minor population in the infiltrate.  stains few plasma cells; B cells are lambda-restricted. Cam 5.2, P40, TTF-1, synaptohysing, chromogranin, PAX-8 and MALU-3 were performed on block 2C. They are negative in lymphoma cells.\par \par Since her hospital admission pt noticed she had lost ~7lbs. She continues to have fatigue and gets SOB with activity. Pt is able to walk a few blocks without needing to stop to rest. Appetite has decreased as well. \par  [de-identified] : 5/27/21: Since last visit, patient is s/p  CHOP on 5/19,  Rituxan and IT MTX on 5/20 at [facility]. \par She report feeling more tired. She also report a lot of mouth sores and pressure/fullness in left ear for last few days. No fevers/chills. No drenching sweats. Appetite is okay, not the best. Weight stable.No abd pain. No N/V/D. No numbness/tingling. No CP. Breathing has improved. No swelling. No bleeding/easy bruising. No dysuria.Has constipation. \par  [Date: ____________] : Patient's last distress assessment performed on [unfilled]. [4 - Distress Level] : Distress Level: 4

## 2021-06-10 NOTE — REVIEW OF SYSTEMS
[Fatigue] : fatigue [Shortness Of Breath] : shortness of breath [Joint Pain] : joint pain [Negative] : Allergic/Immunologic [Recent Change In Weight] : ~T recent weight change [FreeTextEntry6] : SOB improved since start of treatment [FreeTextEntry7] : Reflux

## 2021-06-10 NOTE — RESULTS/DATA
[FreeTextEntry1] : CBC 5/27/21:reviewed \par wbc - 1.2\par hgb - 11.9\par plt - 114.0\par anc - 0.05\par alc - 1.05\par \par Final Diagnosis\par 1. 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 Suggest correlation with clinical\par and therapeutic history for underlying immune deficiency; test\par for EBV viral load is recommended.\par \par Microscopic description: Sections show dense lymphoid infiltrate\par with necrosis; predominantly medium-large sized cells forming\par larger clusters and sheets. Angiocentric and angiodestructive\par pattern is noted; confirmed by elastin stain on block 2B.\par Immunohistochemical stains for CD3, CD20, CD79a, Pax5, ,\par Bcl-2, Bcl-6, Mum-1, CD5, CD10, CD23, CD30, CD43, cyclin D1,\par cMYC, p53, and Ki-67, AE1/AE3,  in situ hybridization for\par Renay-Barr virus-\par -encoded small RNA (VEENA), kappa and lambda\par light chains were performed on block 2B. Lymphoma cells are B\par cells, positive VEENA, CD20, CD79a, Pax5 (dim), Mum-1, Bcl2, CD43,\par variable CD30, negative CD5, CD10, CD23, cyclin D1, p53 (<5%),\par cMYC (<20%). Ki-67 proliferation index is high (>90%). CD3+ T\par cells comprise a minor population in the infiltrate.  stains\par few plasma cells; B cells are lambda-restricted.\par Cam 5.2, P40, TTF-1, synaptohysing, chromogranin, PAX-8 and MALU-\par 3 were performed on block 2C. They are negative in lymphoma\par cells.\par \par

## 2021-06-10 NOTE — ASSESSMENT
[FreeTextEntry1] : Ms. Magallon is a 79 yo female with PMHx of  ANCA vasculitis (dx 20 years ago, been on Azathioprine since then, being followed by Rheum outpt) who presents today for initial consultation for newly Diagnosed Diffuse Large B cell Lymphoma. Pt presented tot he ER on 4/13/21 with chest pain and exertional shortness of breath. Patient reported recent travel to [place] in car (~10 hrs ride). She had a CT angio done which showed right lower lobe and middle lobe pulmonary arterial emboli. She was noted to have B/l LE DVTs - she was started on heparin and switched to Eliquis. Pt need to have chest tube placement as well, discharged with home O2.\par \par In addition pt was found to have large b/l lung masses measuring up to 8.6cm. \par Pulm was consulted and pt underwent biopsy/VATs on 4/16/21, path showed  EBV+ diffuse large B-cell lymphoma. Lymphoma cells are B cells, positive VEENA, CD20, CD79a, Pax5 (dim), Mum-1, Bcl2, CD43,variable CD30, negative CD5, CD10, CD23, cyclin D1, p53 (<5%), cMYC (<20%). Ki-67 proliferation index is high (>90%). CD3+ T cells comprise a minor population in the infiltrate.  stains few plasma cells; B cells are lambda-restricted. \par \par Patient is s/p  CHOP on 5/19,  Rituxan and IT MTX on 5/20 at [facility].\par \par \par #Diffuse Large B Cell Lymphoma EBV positive\par - Discussed with patient and her sister in law the nature of the disease. We discussed the need to start treatment with chemotherapy with RCHOP for curative intent. Went over the risks and side effects of the regimen including - infusion reactions, lower of blood counts, infections, reactivation of Hep B (labs sent out today). Pt agrees to therapy and consent was signed. Info on chemo was printed and given to patient.\par -PET/CT 5/10/21: Status post wedge resection of left upper lobe lung nodule, as compared to CT dated 4/13/2021. Additional FDG-avid, partially necrotic bilateral lung masses and mildly avid groundglass opacities are not significantly changed as compared to prior CT, compatible with biopsy-proven lymphoma. 2. Few mildly FDG-avid, mildly enlarged mediastinal and bilateral hilar lymph nodes are nonspecific. 3. Previously seen indeterminate 3.1 cm lesion in the upper pole of right kidney s FDG-avid. 4. FDG-avid left adrenal nodule is indeterminate. \par -MRI 5/17/21: Right upper pole renal lesion significantly decreased in size of uncertain etiology. A 1.3 cm left adrenal nodule remains indeterminate.\par -s/p C1 RCHOP with IT MTX on 5/19 and 5/20\par - Echo was done inpatient on 4/21/21 with an EF of 69%\par -C2 RCHOP at [facility] on 6/10/21\par -CBC reviewed today: at peri, severely neutropenic. Went over neutropenic precautions with patient. \par -start twice weekly blood draws at home\par -rx Magic Mouth wash for mouth sore. c/w valtrex\par -will start azithromycin for left ear infection\par \par \par #PE/DVT\par - Pt is currently on Eliquis\par -would continue until 6 months after CR from DLBCL \par \par #Right Kidney lesion\par - Pt was noted to have a hypodense lesion on right upper pole on CT done 4/13/21\par - consider MRI after PET/CT for further characterization\par \par #ANCA positive vasculitis \par -hold azathioprine\par -discussed case with Dr. Romano- rituximab should maintain her remission and will monitor symptoms after completion of therapy \par -monitor for symptoms of recurrence (bone/back pain) \par \par Follow up in 1 week, sooner PRN

## 2021-06-11 ENCOUNTER — APPOINTMENT (OUTPATIENT)
Dept: INFUSION THERAPY | Facility: HOSPITAL | Age: 81
End: 2021-06-11

## 2021-06-11 ENCOUNTER — NON-APPOINTMENT (OUTPATIENT)
Age: 81
End: 2021-06-11

## 2021-06-11 DIAGNOSIS — R11.2 NAUSEA WITH VOMITING, UNSPECIFIED: ICD-10-CM

## 2021-06-11 DIAGNOSIS — Z51.11 ENCOUNTER FOR ANTINEOPLASTIC CHEMOTHERAPY: ICD-10-CM

## 2021-06-15 ENCOUNTER — NON-APPOINTMENT (OUTPATIENT)
Age: 81
End: 2021-06-15

## 2021-06-21 ENCOUNTER — LABORATORY RESULT (OUTPATIENT)
Age: 81
End: 2021-06-21

## 2021-06-21 ENCOUNTER — APPOINTMENT (OUTPATIENT)
Dept: HEMATOLOGY ONCOLOGY | Facility: CLINIC | Age: 81
End: 2021-06-21
Payer: MEDICARE

## 2021-06-21 VITALS
SYSTOLIC BLOOD PRESSURE: 120 MMHG | HEART RATE: 96 BPM | BODY MASS INDEX: 26.89 KG/M2 | WEIGHT: 147 LBS | DIASTOLIC BLOOD PRESSURE: 70 MMHG | OXYGEN SATURATION: 97 %

## 2021-06-21 PROCEDURE — 85025 COMPLETE CBC W/AUTO DIFF WBC: CPT | Mod: GC

## 2021-06-21 PROCEDURE — 99214 OFFICE O/P EST MOD 30 MIN: CPT | Mod: GC

## 2021-06-21 PROCEDURE — 99072 ADDL SUPL MATRL&STAF TM PHE: CPT

## 2021-06-21 RX ORDER — DIPHENHYDRAMINE HYDROCHLORIDE AND LIDOCAINE HYDROCHLORIDE AND ALUMINUM HYDROXIDE AND MAGNESIUM HYDRO
KIT
Qty: 1 | Refills: 1 | Status: ACTIVE | COMMUNITY
Start: 2021-05-27 | End: 1900-01-01

## 2021-06-22 ENCOUNTER — LABORATORY RESULT (OUTPATIENT)
Age: 81
End: 2021-06-22

## 2021-06-22 LAB
ALBUMIN SERPL ELPH-MCNC: 3.7 G/DL
ALP BLD-CCNC: 73 U/L
ALT SERPL-CCNC: 11 U/L
ANION GAP SERPL CALC-SCNC: 11 MMOL/L
AST SERPL-CCNC: 20 U/L
BILIRUB SERPL-MCNC: 0.2 MG/DL
BUN SERPL-MCNC: 12 MG/DL
CALCIUM SERPL-MCNC: 9.5 MG/DL
CHLORIDE SERPL-SCNC: 107 MMOL/L
CO2 SERPL-SCNC: 24 MMOL/L
CREAT SERPL-MCNC: 0.94 MG/DL
GLUCOSE SERPL-MCNC: 105 MG/DL
LDH SERPL-CCNC: 258 U/L
MAGNESIUM SERPL-MCNC: 1.8 MG/DL
PHOSPHATE SERPL-MCNC: 3.6 MG/DL
POTASSIUM SERPL-SCNC: 4.4 MMOL/L
PROT SERPL-MCNC: 5.8 G/DL
SODIUM SERPL-SCNC: 142 MMOL/L
URATE SERPL-MCNC: 5.6 MG/DL

## 2021-06-22 NOTE — PHYSICAL EXAM
[Restricted in physically strenuous activity but ambulatory and able to carry out work of a light or sedentary nature] : Status 1- Restricted in physically strenuous activity but ambulatory and able to carry out work of a light or sedentary nature, e.g., light house work, office work [Ulcers] : ulcers [Normal] : affect appropriate [de-identified] : mouth sores impoved [de-identified] : healing left chest wound

## 2021-06-22 NOTE — RESULTS/DATA
[FreeTextEntry1] : CBC 6/21/21 reviewed \par wbc - 10.3\par hgb - 9.6\par plt - 93\par anc -2.71\par alc - 2.71\par \par Final Diagnosis\par 1. 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 Suggest correlation with clinical\par and therapeutic history for underlying immune deficiency; test\par for EBV viral load is recommended.\par \par Microscopic description: Sections show dense lymphoid infiltrate\par with necrosis; predominantly medium-large sized cells forming\par larger clusters and sheets. Angiocentric and angiodestructive\par pattern is noted; confirmed by elastin stain on block 2B.\par Immunohistochemical stains for CD3, CD20, CD79a, Pax5, ,\par Bcl-2, Bcl-6, Mum-1, CD5, CD10, CD23, CD30, CD43, cyclin D1,\par cMYC, p53, and Ki-67, AE1/AE3,  in situ hybridization for\par Renay-Barr virus-\par -encoded small RNA (VEENA), kappa and lambda\par light chains were performed on block 2B. Lymphoma cells are B\par cells, positive VEENA, CD20, CD79a, Pax5 (dim), Mum-1, Bcl2, CD43,\par variable CD30, negative CD5, CD10, CD23, cyclin D1, p53 (<5%),\par cMYC (<20%). Ki-67 proliferation index is high (>90%). CD3+ T\par cells comprise a minor population in the infiltrate.  stains\par few plasma cells; B cells are lambda-restricted.\par Cam 5.2, P40, TTF-1, synaptohysing, chromogranin, PAX-8 and MALU-\par 3 were performed on block 2C. They are negative in lymphoma\par cells.\par \par

## 2021-06-22 NOTE — ASSESSMENT
[FreeTextEntry1] : Ms. Magallon is a 81 yo female with PMHx of  ANCA vasculitis (dx 20 years ago, treated with Azathioprine since then, being followed by Rheum outpt) who presents today for initial consultation for newly Diagnosed Diffuse Large B cell Lymphoma. Pt presented tot he ER on 4/13/21 with chest pain and exertional shortness of breath. Patient reported recent travel to [place] in car (~10 hrs ride). She had a CT angio done which showed right lower lobe and middle lobe pulmonary arterial emboli. She was noted to have B/l LE DVTs - she was started on heparin and switched to Eliquis. Pt need to have chest tube placement as well, discharged with home O2.\par \par In addition pt was found to have large b/l lung masses measuring up to 8.6cm. \par Pulm was consulted and pt underwent biopsy/VATs on 4/16/21, path showed  EBV+ diffuse large B-cell lymphoma. Lymphoma cells are B cells, positive VEENA, CD20, CD79a, Pax5 (dim), Mum-1, Bcl2, CD43,variable CD30, negative CD5, CD10, CD23, cyclin D1, p53 (<5%), cMYC (<20%). Ki-67 proliferation index is high (>90%). CD3+ T cells comprise a minor population in the infiltrate.  stains few plasma cells; B cells are lambda-restricted. \par \par Patient is s/p  CHOP on 5/19,  Rituxan and IT MTX on 5/20 at [facility]. She received Cycle 2 as outpatieent. \par \par \par #Diffuse Large B Cell Lymphoma EBV positive\par - Discussed with patient and her sister in law the nature of the disease. We discussed the need to start treatment with chemotherapy with RCHOP for curative intent. Went over the risks and side effects of the regimen including - infusion reactions, lower of blood counts, infections, reactivation of Hep B (labs sent out today). Pt agrees to therapy and consent was signed. Info on chemo was printed and given to patient.\par -PET/CT 5/10/21: Status post wedge resection of left upper lobe lung nodule, as compared to CT dated 4/13/2021. Additional FDG-avid, partially necrotic bilateral lung masses and mildly avid groundglass opacities are not significantly changed as compared to prior CT, compatible with biopsy-proven lymphoma. 2. Few mildly FDG-avid, mildly enlarged mediastinal and bilateral hilar lymph nodes are nonspecific. 3. Previously seen indeterminate 3.1 cm lesion in the upper pole of right kidney s FDG-avid. 4. FDG-avid left adrenal nodule is indeterminate. \par -MRI 5/17/21: Right upper pole renal lesion significantly decreased in size of uncertain etiology. A 1.3 cm left adrenal nodule remains indeterminate.\par -s/p C1 RCHOP with IT MTX on 5/19 and 5/20\par - Echo was done inpatient on 4/21/21 with an EF of 69%\par -C3 RCHOP at [facility] on 7/1/2021\par -CBC reviewed today:  ANC at 2.71, hgb at 9.6, plt today are 93. Pt will have lab draw done 6/24/21 at local [facility] lab to check plts. If less than 50 will hold eliquis\par -PET/Ct scan ordered, pt to have it done in the next 1-2 weeks.\par -c/w Magic Mouth wash for mouth sores. \par -c/w valtrex\par \par #PE/DVT\par - Pt is currently on Eliquis\par -would continue until 6 months after CR from DLBCL \par \par #Right Kidney lesion\par - Pt was noted to have a hypodense lesion on right upper pole on CT done 4/13/21\par - consider MRI after interim PET/CT for further characterization, suspect this could be related to DLBCL\par \par #ANCA positive vasculitis \par -hold azathioprine\par -discussed case with Dr. Romano- rituximab should maintain her remission and will monitor symptoms after completion of therapy \par -monitor for symptoms of recurrence (bone/back pain) \par \par Follow up after treatment, sooner PRN

## 2021-06-22 NOTE — HISTORY OF PRESENT ILLNESS
[de-identified] : Ms. Magallon is a 79 yo female with PMHx of ANCA vasculitis (dx 20 years ago, been on Azathioprine since then, being followed by Rheum outpt) who presents today for initial consultation for newly Diagnosed Diffuse Large B cell Lymphoma. Pt noticed earlier this year that she had more SOB with activity and fatigue. She did not have night sweats, fever/chills or weight loss with this fatigue.\par Pt presented tot he ER on 4/13/21 with chest pain lasting and exertional shortness of breath. Patient reported recent travel to [place] in car (~10 hrs ride). She had a CT angio done which showed right lower lobe and middle lobe pulmonary arterial emboli. She was noted to have B/l LE DVTs - she was started on heparin and switched to Eliquis. Pt need to have chest tube placement as well and was d/c on O2 therapy. Pt states she used O2 for the first few days at home but does not currently use it. O2 stats at home have been in the 90s\par In addition pt was found to have large b/l lung masses measuring up to 8.6cm. \par Pulm was consulted and pt underwent biopsy/VATs on 4/16/21, path showed  EBV+ diffuse large B-cell lymphoma. Lymphoma cells are B cells, positive VENEA, CD20, CD79a, Pax5 (dim), Mum-1, Bcl2, CD43,variable CD30, negative CD5, CD10, CD23, cyclin D1, p53 (<5%), cMYC (<20%). Ki-67 proliferation index is high (>90%). CD3+ T cells comprise a minor population in the infiltrate.  stains few plasma cells; B cells are lambda-restricted. Cam 5.2, P40, TTF-1, synaptophysin, chromogranin, PAX-8 and MALU-3 were performed on block 2C. They are negative in lymphoma cells.\par \par She received Cycle 1 of R-CHOP on 5/20/21 while admitted to monitor her respiratory function. She had a LP with IT MTX which did not demonstrate involvement with lymphoma.  \par \par \par  [de-identified] : S/p  Cycle 2 RCHOP on 6/10/21\par 6/21/21: Since last visit, she is feeling better. Fatigue is worse after this cycle. Her mouth sores are bothersome, she's using mouth wash which helps. Painful brushing her teeth. Pt has sleep disturbance from the prednisone. No fevers/chills. No drenching sweats. Appetite is okay, not the best. Weight stable.No abd pain. No N/V/D. No numbness/tingling. No CP.She has noticed increase in SOB with exertion. No swelling. No bleeding/easy bruising. No dysuria. Constipation has improved. \par

## 2021-06-22 NOTE — REVIEW OF SYSTEMS
[Fatigue] : fatigue [Recent Change In Weight] : ~T recent weight change [Shortness Of Breath] : shortness of breath [Joint Pain] : joint pain [Negative] : Allergic/Immunologic [Mucosal Pain] : mucosal pain [FreeTextEntry7] : Reflux

## 2021-06-23 LAB
APPEARANCE: ABNORMAL
BILIRUBIN URINE: NEGATIVE
BLOOD URINE: NORMAL
COLOR: YELLOW
GLUCOSE QUALITATIVE U: NEGATIVE
KETONES URINE: NORMAL
LEUKOCYTE ESTERASE URINE: ABNORMAL
NITRITE URINE: POSITIVE
PH URINE: 5.5
PROTEIN URINE: ABNORMAL
SPECIFIC GRAVITY URINE: 1.03
UROBILINOGEN URINE: NORMAL

## 2021-06-25 ENCOUNTER — OUTPATIENT (OUTPATIENT)
Dept: OUTPATIENT SERVICES | Facility: HOSPITAL | Age: 81
LOS: 1 days | End: 2021-06-25
Payer: MEDICARE

## 2021-06-25 ENCOUNTER — APPOINTMENT (OUTPATIENT)
Dept: NUCLEAR MEDICINE | Facility: IMAGING CENTER | Age: 81
End: 2021-06-25
Payer: MEDICARE

## 2021-06-25 DIAGNOSIS — Z90.49 ACQUIRED ABSENCE OF OTHER SPECIFIED PARTS OF DIGESTIVE TRACT: Chronic | ICD-10-CM

## 2021-06-25 DIAGNOSIS — C83.30 DIFFUSE LARGE B-CELL LYMPHOMA, UNSPECIFIED SITE: ICD-10-CM

## 2021-06-25 DIAGNOSIS — B27.09 GAMMAHERPESVIRAL MONONUCLEOSIS WITH OTHER COMPLICATIONS: ICD-10-CM

## 2021-06-25 PROCEDURE — 78815 PET IMAGE W/CT SKULL-THIGH: CPT | Mod: 26,PS,MH

## 2021-06-25 PROCEDURE — A9552: CPT

## 2021-06-25 PROCEDURE — 78815 PET IMAGE W/CT SKULL-THIGH: CPT

## 2021-06-26 ENCOUNTER — NON-APPOINTMENT (OUTPATIENT)
Age: 81
End: 2021-06-26

## 2021-06-26 ENCOUNTER — OUTPATIENT (OUTPATIENT)
Dept: OUTPATIENT SERVICES | Facility: HOSPITAL | Age: 81
LOS: 1 days | Discharge: HOME | End: 2021-06-26

## 2021-06-26 DIAGNOSIS — Z90.49 ACQUIRED ABSENCE OF OTHER SPECIFIED PARTS OF DIGESTIVE TRACT: Chronic | ICD-10-CM

## 2021-06-26 DIAGNOSIS — C83.30 DIFFUSE LARGE B-CELL LYMPHOMA, UNSPECIFIED SITE: ICD-10-CM

## 2021-07-01 ENCOUNTER — APPOINTMENT (OUTPATIENT)
Dept: INFUSION THERAPY | Facility: HOSPITAL | Age: 81
End: 2021-07-01

## 2021-07-01 ENCOUNTER — RESULT REVIEW (OUTPATIENT)
Age: 81
End: 2021-07-01

## 2021-07-01 DIAGNOSIS — Z51.89 ENCOUNTER FOR OTHER SPECIFIED AFTERCARE: ICD-10-CM

## 2021-07-01 DIAGNOSIS — Z51.11 ENCOUNTER FOR ANTINEOPLASTIC CHEMOTHERAPY: ICD-10-CM

## 2021-07-01 DIAGNOSIS — R11.2 NAUSEA WITH VOMITING, UNSPECIFIED: ICD-10-CM

## 2021-07-01 LAB
BASOPHILS # BLD AUTO: 0.09 K/UL — SIGNIFICANT CHANGE UP (ref 0–0.2)
BASOPHILS NFR BLD AUTO: 1.8 % — SIGNIFICANT CHANGE UP (ref 0–2)
EOSINOPHIL # BLD AUTO: 0.01 K/UL — SIGNIFICANT CHANGE UP (ref 0–0.5)
EOSINOPHIL NFR BLD AUTO: 0.2 % — SIGNIFICANT CHANGE UP (ref 0–6)
HCT VFR BLD CALC: 34.3 % — LOW (ref 34.5–45)
HGB BLD-MCNC: 11.3 G/DL — LOW (ref 11.5–15.5)
IMM GRANULOCYTES NFR BLD AUTO: 1.2 % — SIGNIFICANT CHANGE UP (ref 0–1.5)
LYMPHOCYTES # BLD AUTO: 1.15 K/UL — SIGNIFICANT CHANGE UP (ref 1–3.3)
LYMPHOCYTES # BLD AUTO: 22.5 % — SIGNIFICANT CHANGE UP (ref 13–44)
MCHC RBC-ENTMCNC: 32.9 G/DL — SIGNIFICANT CHANGE UP (ref 32–36)
MCHC RBC-ENTMCNC: 34.9 PG — HIGH (ref 27–34)
MCV RBC AUTO: 105.9 FL — HIGH (ref 80–100)
MONOCYTES # BLD AUTO: 0.87 K/UL — SIGNIFICANT CHANGE UP (ref 0–0.9)
MONOCYTES NFR BLD AUTO: 17 % — HIGH (ref 2–14)
NEUTROPHILS # BLD AUTO: 2.94 K/UL — SIGNIFICANT CHANGE UP (ref 1.8–7.4)
NEUTROPHILS NFR BLD AUTO: 57.3 % — SIGNIFICANT CHANGE UP (ref 43–77)
NRBC # BLD: 0 /100 WBCS — SIGNIFICANT CHANGE UP (ref 0–0)
PLATELET # BLD AUTO: 386 K/UL — SIGNIFICANT CHANGE UP (ref 150–400)
RBC # BLD: 3.24 M/UL — LOW (ref 3.8–5.2)
RBC # FLD: 19.1 % — HIGH (ref 10.3–14.5)
WBC # BLD: 5.12 K/UL — SIGNIFICANT CHANGE UP (ref 3.8–10.5)
WBC # FLD AUTO: 5.12 K/UL — SIGNIFICANT CHANGE UP (ref 3.8–10.5)

## 2021-07-02 ENCOUNTER — NON-APPOINTMENT (OUTPATIENT)
Age: 81
End: 2021-07-02

## 2021-07-15 ENCOUNTER — APPOINTMENT (OUTPATIENT)
Dept: HEMATOLOGY ONCOLOGY | Facility: CLINIC | Age: 81
End: 2021-07-15
Payer: MEDICARE

## 2021-07-15 VITALS
DIASTOLIC BLOOD PRESSURE: 75 MMHG | TEMPERATURE: 98.1 F | HEART RATE: 91 BPM | WEIGHT: 146 LBS | BODY MASS INDEX: 26.7 KG/M2 | RESPIRATION RATE: 15 BRPM | OXYGEN SATURATION: 98 % | SYSTOLIC BLOOD PRESSURE: 110 MMHG

## 2021-07-15 PROCEDURE — 85025 COMPLETE CBC W/AUTO DIFF WBC: CPT | Mod: GC

## 2021-07-15 PROCEDURE — 99214 OFFICE O/P EST MOD 30 MIN: CPT | Mod: GC

## 2021-07-15 PROCEDURE — 99072 ADDL SUPL MATRL&STAF TM PHE: CPT

## 2021-07-15 RX ORDER — AZITHROMYCIN 250 MG/1
250 TABLET, FILM COATED ORAL
Qty: 1 | Refills: 0 | Status: COMPLETED | COMMUNITY
Start: 2021-05-27 | End: 2021-07-15

## 2021-07-15 RX ORDER — NITROFURANTOIN (MONOHYDRATE/MACROCRYSTALS) 25; 75 MG/1; MG/1
100 CAPSULE ORAL TWICE DAILY
Qty: 14 | Refills: 0 | Status: COMPLETED | COMMUNITY
Start: 2021-06-25 | End: 2021-07-15

## 2021-07-15 NOTE — REVIEW OF SYSTEMS
[Fatigue] : fatigue [Recent Change In Weight] : ~T recent weight change [Shortness Of Breath] : shortness of breath [Negative] : Allergic/Immunologic [FreeTextEntry2] : worsened fatigue after C3 [FreeTextEntry4] : healing mouth sores, improved [FreeTextEntry7] : Reflux

## 2021-07-15 NOTE — END OF VISIT
[FreeTextEntry3] : Patient seen and case discussed with MARGRET Prado. I agree with above and have edited the note where needed.\par

## 2021-07-15 NOTE — PHYSICAL EXAM
[Restricted in physically strenuous activity but ambulatory and able to carry out work of a light or sedentary nature] : Status 1- Restricted in physically strenuous activity but ambulatory and able to carry out work of a light or sedentary nature, e.g., light house work, office work [Ulcers] : ulcers [Normal] : affect appropriate [de-identified] : mouth sores improved [de-identified] : healing left chest wound

## 2021-07-15 NOTE — RESULTS/DATA
[FreeTextEntry1] : CBC 7/15/21: reviewed \par wbc - 8.9\par hgb - 10.4\par plt - 216.0\par anc -4.03\par alc - 2.65\par \par Interim PET/CT 6/25/21: \par IMPRESSION: Compared to FDG-PET/CT scan dated 5/10/2021:\par \par 1. Mildly FDG-avid bilateral lung nodules are decreased in size and metabolism, compatible with a partial response to interval therapy ([place]au[place] 4). Resolution of mildly FDG-avid bilateral groundglass pulmonary opacities.\par \par 2. Few minimally FDG-avid mediastinal and bilateral hilar lymph nodes are similar in size and decreased in metabolism.\par \par 3. Resolution of FDG activity associated with right upper pole renal lesion which is better delineated on prior contrast-enhanced CT and interval MRI.\par \par 4. Resolution of FDG-avid left adrenal nodule.\par \par 5. Resolution of increased FDG activity in anterior aspect of left 9th rib.\par \par 6. Resolution of linear focus of increased FDG activity in lateral aspect of left chest wall.\par \par 7. Complete opacification of left maxillary sinus with mild, peripheral FDG avidity, unchanged. Correlate clinically for acute sinusitis.\par \par \par \par \par \par \par \par \par \par KIRILL MIR MD; Attending Nuclear Medicine\par This document has been electronically signed. Jun 28 2021  3:35PM\par \par  \par \par \par Final Diagnosis\par 1. 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 Suggest correlation with clinical\par and therapeutic history for underlying immune deficiency; test\par for EBV viral load is recommended.\par \par Microscopic description: Sections show dense lymphoid infiltrate\par with necrosis; predominantly medium-large sized cells forming\par larger clusters and sheets. Angiocentric and angiodestructive\par pattern is noted; confirmed by elastin stain on block 2B.\par Immunohistochemical stains for CD3, CD20, CD79a, Pax5, ,\par Bcl-2, Bcl-6, Mum-1, CD5, CD10, CD23, CD30, CD43, cyclin D1,\par cMYC, p53, and Ki-67, AE1/AE3,  in situ hybridization for\par Renay-Barr virus-\par -encoded small RNA (VEENA), kappa and lambda\par light chains were performed on block 2B. Lymphoma cells are B\par cells, positive VEENA, CD20, CD79a, Pax5 (dim), Mum-1, Bcl2, CD43,\par variable CD30, negative CD5, CD10, CD23, cyclin D1, p53 (<5%),\par cMYC (<20%). Ki-67 proliferation index is high (>90%). CD3+ T\par cells comprise a minor population in the infiltrate.  stains\par few plasma cells; B cells are lambda-restricted.\par Cam 5.2, P40, TTF-1, synaptohysing, chromogranin, PAX-8 and MALU-\par 3 were performed on block 2C. They are negative in lymphoma\par cells.\par \par

## 2021-07-15 NOTE — ASSESSMENT
[FreeTextEntry1] : Ms. Magallon is a 79 yo female with PMHx of  ANCA vasculitis (dx 20 years ago, treated with Azathioprine since then, being followed by Rheum outpt) who presents today for initial consultation for newly Diagnosed Diffuse Large B cell Lymphoma. Pt presented tot he ER on 4/13/21 with chest pain and exertional shortness of breath. Patient reported recent travel to [place] in car (~10 hrs ride). She had a CT angio done which showed right lower lobe and middle lobe pulmonary arterial emboli. She was noted to have B/l LE DVTs - she was started on heparin and switched to Eliquis. Pt need to have chest tube placement as well, discharged with home O2.\par \par In addition pt was found to have large b/l lung masses measuring up to 8.6cm. \par Pulm was consulted and pt underwent biopsy/VATs on 4/16/21, path showed  EBV+ diffuse large B-cell lymphoma. Lymphoma cells are B cells, positive VEENA, CD20, CD79a, Pax5 (dim), Mum-1, Bcl2, CD43,variable CD30, negative CD5, CD10, CD23, cyclin D1, p53 (<5%), cMYC (<20%). Ki-67 proliferation index is high (>90%). CD3+ T cells comprise a minor population in the infiltrate.  stains few plasma cells; B cells are lambda-restricted. \par \par Patient is s/p  CHOP on 5/19,  Rituxan and IT MTX on 5/20 at [facility]. She received Cycle 2 as outpatieent. \par \par #Diffuse Large B Cell Lymphoma EBV positive\par - Discussed with patient and her sister in law the nature of the disease. We discussed the need to start treatment with chemotherapy with RCHOP for curative intent. Went over the risks and side effects of the regimen including - infusion reactions, lower of blood counts, infections, reactivation of Hepatitis B. Pt agrees to therapy and consent was signed. Info on chemo was printed and given to patient.\par -PET/CT 5/10/21: Status post wedge resection of left upper lobe lung nodule, as compared to CT dated 4/13/2021. Additional FDG-avid, partially necrotic bilateral lung masses and mildly avid groundglass opacities are not significantly changed as compared to prior CT, compatible with biopsy-proven lymphoma. 2. Few mildly FDG-avid, mildly enlarged mediastinal and bilateral hilar lymph nodes are nonspecific. 3. Previously seen indeterminate 3.1 cm lesion in the upper pole of right kidney s FDG-avid. 4. FDG-avid left adrenal nodule is indeterminate. \par -MRI 5/17/21: Right upper pole renal lesion significantly decreased in size of uncertain etiology. A 1.3 cm left adrenal nodule remains indeterminate.\par -s/p C1 RCHOP with IT MTX on 5/19 and 5/20\par - Echo was done inpatient on 4/21/21 with an EF of 69%\par -s/[place] [facility] [place]\par -C4 RCHOP on 7/22/21\par -CBC reviewed today: Hgb 10.4, plt wnl @ 216k. \par -due to worsening fatigue with chemo, will schedule at home weekly labs and IVFs\par -Interim PET/CT 6/25/21 shows no progression of disease and is responding to treatment. Plan to repeat PET/CT after C4\par -c/w Magic Mouth wash for mouth sores. \par -c/w valtrex\par \par #PE/DVT\par - Pt is currently on Eliquis\par -would continue until 6 months after CR from DLBCL \par \par #Right Kidney lesion\par - Pt was noted to have a hypodense lesion on right upper pole on CT done 4/13/21\par - consider MRI after interim PET/CT for further characterization, suspect this could be related to DLBCL\par \par #ANCA positive vasculitis \par -hold azathioprine\par -discussed case with Dr. Romano- rituximab should maintain her remission and will monitor symptoms after completion of therapy \par -monitor for symptoms of recurrence (bone/back pain) \par \par Follow up after treatment, sooner PRN

## 2021-07-15 NOTE — HISTORY OF PRESENT ILLNESS
[de-identified] : Ms. Magallon is a 81 yo female with PMHx of ANCA vasculitis (dx 20 years ago, been on Azathioprine since then, being followed by Rheum outpt) who presents today for initial consultation for newly Diagnosed Diffuse Large B cell Lymphoma. Pt noticed earlier this year that she had more SOB with activity and fatigue. She did not have night sweats, fever/chills or weight loss with this fatigue.\par Pt presented tot he ER on 4/13/21 with chest pain lasting and exertional shortness of breath. Patient reported recent travel to [place] in car (~10 hrs ride). She had a CT angio done which showed right lower lobe and middle lobe pulmonary arterial emboli. She was noted to have B/l LE DVTs - she was started on heparin and switched to Eliquis. Pt need to have chest tube placement as well and was d/c on O2 therapy. Pt states she used O2 for the first few days at home but does not currently use it. O2 stats at home have been in the 90s\par In addition pt was found to have large b/l lung masses measuring up to 8.6cm. \par Pulm was consulted and pt underwent biopsy/VATs on 4/16/21, path showed  EBV+ diffuse large B-cell lymphoma. Lymphoma cells are B cells, positive VEENA, CD20, CD79a, Pax5 (dim), Mum-1, Bcl2, CD43,variable CD30, negative CD5, CD10, CD23, cyclin D1, p53 (<5%), cMYC (<20%). Ki-67 proliferation index is high (>90%). CD3+ T cells comprise a minor population in the infiltrate.  stains few plasma cells; B cells are lambda-restricted. Cam 5.2, P40, TTF-1, synaptophysin, chromogranin, PAX-8 and MALU-3 were performed on block 2C. They are negative in lymphoma cells.\par \par She received Cycle 1 of R-CHOP on 5/20/21 while admitted to monitor her respiratory function. She had a LP with IT MTX which did not demonstrate involvement with lymphoma.  \par \par \par  [de-identified] : S/p  Cycle 3 RCHOP on 7/1/21:\par 7/15/21: Since last visit, patient reports fatigue is worse after this past cycle of treatment. She feels tired and occasionally unable to walk for a long time without being out of breath. She also endorses she needs to f/u with derm for her eczema. Her mouth sores have improved, she's using mouth wash which helps. Pt has sleep disturbance from the prednisone. No fevers/chills. No drenching sweats. Appetite is okay, not the best. Weight stable.No abd pain. No N/V/D. No numbness/tingling. No CP. No palpitations. She has noticed increase in SOB with exertion. No swelling. No bleeding/easy bruising. No dysuria. Constipation has improved with laxatives. \par

## 2021-07-16 LAB
ALBUMIN SERPL ELPH-MCNC: 3.9 G/DL
ALP BLD-CCNC: 71 U/L
ALT SERPL-CCNC: 13 U/L
ANION GAP SERPL CALC-SCNC: 10 MMOL/L
AST SERPL-CCNC: 25 U/L
BILIRUB SERPL-MCNC: 0.2 MG/DL
BUN SERPL-MCNC: 11 MG/DL
CALCIUM SERPL-MCNC: 9.3 MG/DL
CHLORIDE SERPL-SCNC: 108 MMOL/L
CO2 SERPL-SCNC: 22 MMOL/L
CREAT SERPL-MCNC: 0.82 MG/DL
GLUCOSE SERPL-MCNC: 98 MG/DL
LDH SERPL-CCNC: 244 U/L
MAGNESIUM SERPL-MCNC: 2.1 MG/DL
PHOSPHATE SERPL-MCNC: 3.6 MG/DL
POTASSIUM SERPL-SCNC: 4.6 MMOL/L
PROT SERPL-MCNC: 6.1 G/DL
SODIUM SERPL-SCNC: 140 MMOL/L
URATE SERPL-MCNC: 5.8 MG/DL

## 2021-07-22 ENCOUNTER — RESULT REVIEW (OUTPATIENT)
Age: 81
End: 2021-07-22

## 2021-07-22 ENCOUNTER — APPOINTMENT (OUTPATIENT)
Dept: INFUSION THERAPY | Facility: HOSPITAL | Age: 81
End: 2021-07-22

## 2021-07-22 LAB
BASOPHILS # BLD AUTO: 0.09 K/UL — SIGNIFICANT CHANGE UP (ref 0–0.2)
BASOPHILS NFR BLD AUTO: 2.1 % — HIGH (ref 0–2)
EOSINOPHIL # BLD AUTO: 0.01 K/UL — SIGNIFICANT CHANGE UP (ref 0–0.5)
EOSINOPHIL NFR BLD AUTO: 0.2 % — SIGNIFICANT CHANGE UP (ref 0–6)
HCT VFR BLD CALC: 32.3 % — LOW (ref 34.5–45)
HGB BLD-MCNC: 11 G/DL — LOW (ref 11.5–15.5)
IMM GRANULOCYTES NFR BLD AUTO: 1.2 % — SIGNIFICANT CHANGE UP (ref 0–1.5)
LYMPHOCYTES # BLD AUTO: 0.99 K/UL — LOW (ref 1–3.3)
LYMPHOCYTES # BLD AUTO: 22.9 % — SIGNIFICANT CHANGE UP (ref 13–44)
MCHC RBC-ENTMCNC: 34.1 G/DL — SIGNIFICANT CHANGE UP (ref 32–36)
MCHC RBC-ENTMCNC: 36.3 PG — HIGH (ref 27–34)
MCV RBC AUTO: 106.6 FL — HIGH (ref 80–100)
MONOCYTES # BLD AUTO: 0.78 K/UL — SIGNIFICANT CHANGE UP (ref 0–0.9)
MONOCYTES NFR BLD AUTO: 18 % — HIGH (ref 2–14)
NEUTROPHILS # BLD AUTO: 2.41 K/UL — SIGNIFICANT CHANGE UP (ref 1.8–7.4)
NEUTROPHILS NFR BLD AUTO: 55.6 % — SIGNIFICANT CHANGE UP (ref 43–77)
NRBC # BLD: 0 /100 WBCS — SIGNIFICANT CHANGE UP (ref 0–0)
PLATELET # BLD AUTO: 268 K/UL — SIGNIFICANT CHANGE UP (ref 150–400)
RBC # BLD: 3.03 M/UL — LOW (ref 3.8–5.2)
RBC # FLD: 18.8 % — HIGH (ref 10.3–14.5)
WBC # BLD: 4.33 K/UL — SIGNIFICANT CHANGE UP (ref 3.8–10.5)
WBC # FLD AUTO: 4.33 K/UL — SIGNIFICANT CHANGE UP (ref 3.8–10.5)

## 2021-07-29 ENCOUNTER — OUTPATIENT (OUTPATIENT)
Dept: OUTPATIENT SERVICES | Facility: HOSPITAL | Age: 81
LOS: 1 days | Discharge: HOME | End: 2021-07-29

## 2021-07-29 DIAGNOSIS — C83.30 DIFFUSE LARGE B-CELL LYMPHOMA, UNSPECIFIED SITE: ICD-10-CM

## 2021-07-29 DIAGNOSIS — Z90.49 ACQUIRED ABSENCE OF OTHER SPECIFIED PARTS OF DIGESTIVE TRACT: Chronic | ICD-10-CM

## 2021-07-29 RX ORDER — SULFAMETHOXAZOLE AND TRIMETHOPRIM 800; 160 MG/1; MG/1
800-160 TABLET ORAL
Qty: 90 | Refills: 1 | Status: DISCONTINUED | COMMUNITY
Start: 2021-07-29 | End: 2021-07-29

## 2021-07-30 ENCOUNTER — RESULT REVIEW (OUTPATIENT)
Age: 81
End: 2021-07-30

## 2021-07-30 ENCOUNTER — INPATIENT (INPATIENT)
Facility: HOSPITAL | Age: 81
LOS: 6 days | Discharge: NURSING, REHAB OR LONG-TERM CARE | End: 2021-08-06
Attending: HOSPITALIST | Admitting: HOSPITALIST
Payer: MEDICARE

## 2021-07-30 ENCOUNTER — APPOINTMENT (OUTPATIENT)
Dept: INFUSION THERAPY | Facility: HOSPITAL | Age: 81
End: 2021-07-30

## 2021-07-30 ENCOUNTER — OUTPATIENT (OUTPATIENT)
Dept: OUTPATIENT SERVICES | Facility: HOSPITAL | Age: 81
LOS: 1 days | End: 2021-07-30
Payer: MEDICARE

## 2021-07-30 VITALS
DIASTOLIC BLOOD PRESSURE: 43 MMHG | SYSTOLIC BLOOD PRESSURE: 118 MMHG | HEIGHT: 61 IN | OXYGEN SATURATION: 97 % | HEART RATE: 85 BPM | RESPIRATION RATE: 20 BRPM | TEMPERATURE: 98 F

## 2021-07-30 DIAGNOSIS — C83.30 DIFFUSE LARGE B-CELL LYMPHOMA, UNSPECIFIED SITE: ICD-10-CM

## 2021-07-30 DIAGNOSIS — Z90.49 ACQUIRED ABSENCE OF OTHER SPECIFIED PARTS OF DIGESTIVE TRACT: Chronic | ICD-10-CM

## 2021-07-30 LAB
ALBUMIN SERPL ELPH-MCNC: 3.3 G/DL — SIGNIFICANT CHANGE UP (ref 3.3–5)
ALP SERPL-CCNC: 47 U/L — SIGNIFICANT CHANGE UP (ref 40–120)
ALT FLD-CCNC: 7 U/L — SIGNIFICANT CHANGE UP (ref 4–33)
ANION GAP SERPL CALC-SCNC: 15 MMOL/L — HIGH (ref 7–14)
APTT BLD: 29.9 SEC — SIGNIFICANT CHANGE UP (ref 27–36.3)
AST SERPL-CCNC: 17 U/L — SIGNIFICANT CHANGE UP (ref 4–32)
BASE EXCESS BLDV CALC-SCNC: -4.1 MMOL/L — LOW (ref -3–2)
BASOPHILS # BLD AUTO: 0.02 K/UL — SIGNIFICANT CHANGE UP (ref 0–0.2)
BASOPHILS # BLD AUTO: 0.04 K/UL — SIGNIFICANT CHANGE UP (ref 0–0.2)
BASOPHILS NFR BLD AUTO: 4 % — HIGH (ref 0–2)
BASOPHILS NFR BLD AUTO: 4 % — HIGH (ref 0–2)
BILIRUB SERPL-MCNC: 0.4 MG/DL — SIGNIFICANT CHANGE UP (ref 0.2–1.2)
BLD GP AB SCN SERPL QL: NEGATIVE — SIGNIFICANT CHANGE UP
BUN SERPL-MCNC: 20 MG/DL — SIGNIFICANT CHANGE UP (ref 7–23)
CALCIUM SERPL-MCNC: 8.4 MG/DL — SIGNIFICANT CHANGE UP (ref 8.4–10.5)
CHLORIDE SERPL-SCNC: 106 MMOL/L — SIGNIFICANT CHANGE UP (ref 98–107)
CO2 SERPL-SCNC: 17 MMOL/L — LOW (ref 22–31)
CREAT SERPL-MCNC: 0.7 MG/DL — SIGNIFICANT CHANGE UP (ref 0.5–1.3)
EOSINOPHIL # BLD AUTO: 0 K/UL — SIGNIFICANT CHANGE UP (ref 0–0.5)
EOSINOPHIL # BLD AUTO: 0.01 K/UL — SIGNIFICANT CHANGE UP (ref 0–0.5)
EOSINOPHIL NFR BLD AUTO: 0 % — SIGNIFICANT CHANGE UP (ref 0–6)
EOSINOPHIL NFR BLD AUTO: 2 % — SIGNIFICANT CHANGE UP (ref 0–6)
GLUCOSE SERPL-MCNC: 167 MG/DL — HIGH (ref 70–99)
HCO3 BLDV-SCNC: 21 MMOL/L — SIGNIFICANT CHANGE UP (ref 20–27)
HCT VFR BLD CALC: 25.6 % — LOW (ref 34.5–45)
HCT VFR BLD CALC: 27.7 % — LOW (ref 34.5–45)
HGB BLD-MCNC: 8.4 G/DL — LOW (ref 11.5–15.5)
HGB BLD-MCNC: 9.6 G/DL — LOW (ref 11.5–15.5)
IANC: 0.18 K/UL — LOW (ref 1.5–8.5)
IMM GRANULOCYTES NFR BLD AUTO: 0 % — SIGNIFICANT CHANGE UP (ref 0–1.5)
INR BLD: 1.62 RATIO — HIGH (ref 0.88–1.16)
LYMPHOCYTES # BLD AUTO: 0.25 K/UL — LOW (ref 1–3.3)
LYMPHOCYTES # BLD AUTO: 0.84 K/UL — LOW (ref 1–3.3)
LYMPHOCYTES # BLD AUTO: 50 % — HIGH (ref 13–44)
LYMPHOCYTES # BLD AUTO: 90 % — HIGH (ref 13–44)
MAGNESIUM SERPL-MCNC: 1.9 MG/DL — SIGNIFICANT CHANGE UP (ref 1.6–2.6)
MCHC RBC-ENTMCNC: 32.8 GM/DL — SIGNIFICANT CHANGE UP (ref 32–36)
MCHC RBC-ENTMCNC: 34.7 G/DL — SIGNIFICANT CHANGE UP (ref 32–36)
MCHC RBC-ENTMCNC: 35.6 PG — HIGH (ref 27–34)
MCHC RBC-ENTMCNC: 36.4 PG — HIGH (ref 27–34)
MCV RBC AUTO: 104.9 FL — HIGH (ref 80–100)
MCV RBC AUTO: 108.5 FL — HIGH (ref 80–100)
MONOCYTES # BLD AUTO: 0.02 K/UL — SIGNIFICANT CHANGE UP (ref 0–0.9)
MONOCYTES # BLD AUTO: 0.04 K/UL — SIGNIFICANT CHANGE UP (ref 0–0.9)
MONOCYTES NFR BLD AUTO: 2 % — SIGNIFICANT CHANGE UP (ref 2–14)
MONOCYTES NFR BLD AUTO: 8 % — SIGNIFICANT CHANGE UP (ref 2–14)
NEUTROPHILS # BLD AUTO: 0.04 K/UL — LOW (ref 1.8–7.4)
NEUTROPHILS # BLD AUTO: 0.18 K/UL — LOW (ref 1.8–7.4)
NEUTROPHILS NFR BLD AUTO: 36 % — LOW (ref 43–77)
NEUTROPHILS NFR BLD AUTO: 4 % — LOW (ref 43–77)
NRBC # BLD: 0 /100 WBCS — SIGNIFICANT CHANGE UP
NRBC # BLD: 0 /100 — SIGNIFICANT CHANGE UP (ref 0–0)
NRBC # BLD: SIGNIFICANT CHANGE UP /100 WBCS (ref 0–0)
NRBC # FLD: 0 K/UL — SIGNIFICANT CHANGE UP
PCO2 BLDV: 34 MMHG — LOW (ref 41–51)
PH BLDV: 7.39 — SIGNIFICANT CHANGE UP (ref 7.32–7.43)
PLAT MORPH BLD: NORMAL — SIGNIFICANT CHANGE UP
PLATELET # BLD AUTO: 7 K/UL — CRITICAL LOW (ref 150–400)
PLATELET # BLD AUTO: 9 K/UL — CRITICAL LOW (ref 150–400)
PO2 BLDV: 33 MMHG — LOW (ref 35–40)
POTASSIUM SERPL-MCNC: 3.6 MMOL/L — SIGNIFICANT CHANGE UP (ref 3.5–5.3)
POTASSIUM SERPL-SCNC: 3.6 MMOL/L — SIGNIFICANT CHANGE UP (ref 3.5–5.3)
PROT SERPL-MCNC: 5.6 G/DL — LOW (ref 6–8.3)
PROTHROM AB SERPL-ACNC: 18.1 SEC — HIGH (ref 10.6–13.6)
RBC # BLD: 2.36 M/UL — LOW (ref 3.8–5.2)
RBC # BLD: 2.64 M/UL — LOW (ref 3.8–5.2)
RBC # FLD: 15.5 % — HIGH (ref 10.3–14.5)
RBC # FLD: 15.7 % — HIGH (ref 10.3–14.5)
RBC BLD AUTO: SIGNIFICANT CHANGE UP
RH IG SCN BLD-IMP: POSITIVE — SIGNIFICANT CHANGE UP
SAO2 % BLDV: 54.5 % — LOW (ref 60–85)
SODIUM SERPL-SCNC: 138 MMOL/L — SIGNIFICANT CHANGE UP (ref 135–145)
TROPONIN T, HIGH SENSITIVITY RESULT: 13 NG/L — SIGNIFICANT CHANGE UP
TROPONIN T, HIGH SENSITIVITY RESULT: 15 NG/L — SIGNIFICANT CHANGE UP
WBC # BLD: 0.5 K/UL — CRITICAL LOW (ref 3.8–10.5)
WBC # BLD: 0.93 K/UL — CRITICAL LOW (ref 3.8–10.5)
WBC # FLD AUTO: 0.5 K/UL — CRITICAL LOW (ref 3.8–10.5)
WBC # FLD AUTO: 0.93 K/UL — CRITICAL LOW (ref 3.8–10.5)

## 2021-07-30 PROCEDURE — 71045 X-RAY EXAM CHEST 1 VIEW: CPT | Mod: 26

## 2021-07-30 PROCEDURE — 99285 EMERGENCY DEPT VISIT HI MDM: CPT

## 2021-07-30 PROCEDURE — 86850 RBC ANTIBODY SCREEN: CPT

## 2021-07-30 PROCEDURE — 86900 BLOOD TYPING SEROLOGIC ABO: CPT

## 2021-07-30 PROCEDURE — 73600 X-RAY EXAM OF ANKLE: CPT | Mod: 26,LT

## 2021-07-30 PROCEDURE — 73630 X-RAY EXAM OF FOOT: CPT | Mod: 26,LT

## 2021-07-30 PROCEDURE — 86901 BLOOD TYPING SEROLOGIC RH(D): CPT

## 2021-07-30 RX ORDER — SODIUM CHLORIDE 9 MG/ML
500 INJECTION INTRAMUSCULAR; INTRAVENOUS; SUBCUTANEOUS ONCE
Refills: 0 | Status: COMPLETED | OUTPATIENT
Start: 2021-07-30 | End: 2021-07-30

## 2021-07-30 RX ADMIN — SODIUM CHLORIDE 500 MILLILITER(S): 9 INJECTION INTRAMUSCULAR; INTRAVENOUS; SUBCUTANEOUS at 20:59

## 2021-07-30 NOTE — ED PROVIDER NOTE - PROGRESS NOTE DETAILS
PGY 1 Saúl Miller: Accepted for admission to hospitalist. ortho to see. Platelets given. No indication for abx at this time.

## 2021-07-30 NOTE — ED ADULT NURSE REASSESSMENT NOTE - NS ED NURSE REASSESS COMMENT FT1
Report received from dayshift RN. Pt aaox4. Vital signs reassessed as noted. Labs sent. Pt repositioned for comfort. Will continue to monitor.

## 2021-07-30 NOTE — ED PROVIDER NOTE - PMH
ANCA-associated vasculitis    Anxiety and depression    DLBCL (diffuse large B cell lymphoma)    DVT, lower extremity    GERD (gastroesophageal reflux disease)    Hyperlipidemia    Lung mass  s/p left wedge resection  Pulmonary embolism

## 2021-07-30 NOTE — ED PROVIDER NOTE - WR ORDER ID 4
Body Location Override (Optional - Billing Will Still Be Based On Selected Body Map Location If Applicable): left superior forehead Detail Level: Detailed Add 15268 Cpt? (Important Note: In 2017 The Use Of 75179 Is Being Tracked By Cms To Determine Future Global Period Reimbursement For Global Periods): yes [id number]

## 2021-07-30 NOTE — ED ADULT NURSE NOTE - INTERVENTIONS DEFINITIONS
[place] to call system/Call bell, personal items and telephone within reach/Instruct patient to call for assistance/Non-slip footwear when patient is off stretcher/Physically safe environment: no spills, clutter or unnecessary equipment/Stretcher in lowest position, wheels locked, appropriate side rails in place/Monitor gait and stability

## 2021-07-30 NOTE — ED ADULT NURSE NOTE - CCCP TRG CHIEF CMPLNT
sent from [facility] center for medical eval  has left ACW infusa port/abnormal lab result

## 2021-07-30 NOTE — ED ADULT NURSE NOTE - OBJECTIVE STATEMENT
pt received in rm 15 AAO x 3. pt with hx of non hodgkin's lymphoma last chemo a week ago. pt states she was advised to come to ed for low platelet and WBC. pt c/o urine frequency and burning, weakness and pain to Left ankle. pt also reports a fall today. pt denies LOC, head trauma, sob, chest pain, n/v/d, fevers, chills. respirations even and unlabored. pt noted to have chemo port to left chest wall. pt arrives with 22g to right ac. awaiting Md orders at this time.

## 2021-07-30 NOTE — ED PROVIDER NOTE - PHYSICAL EXAMINATION
Gen: A&Ox4   HEENT: Atraumatic. Mucous membranes dry, no scleral icterus.  CV: RRR. No murmurs. No significant LE edema. Distal pulses 2+. Capillary refill < 2 seconds.  Resp: Respirations unlabored. CTAB, no rales, rhonchi, or wheezes.  GI: Abdomen non tender to palpation, soft and non-distended. + BS.  Skin/MSK: Scattered erythema over bilateral lower extremities, does not appear cellulitic. Tender over lateral malleolus on left. Mild pain w/ passive and active ROM. Pulses and sensation intact. No open wounds. No ecchymosis appreciated.  Neuro: Following commands. Moving extremities spontaneously. CN2-12 grossly intact. Motor strength +5/5 throughout upper and lower extremities. Sensation intact throughout upper and lower extremities. Finger to nose well coordinated.  Psych: Appropriate mood, cooperative

## 2021-07-30 NOTE — ED PROVIDER NOTE - ATTENDING CONTRIBUTION TO CARE
agree with resident note    " 81 yo F hx of HTN, ANCA vasculitis (8yrs ago was on Azathioprine, stopped on last admission due to cancer), PE (on Eliquis), and DLBCL with pulmonary involvement found in 4/2021, presenting from outpt clinic as directed by her oncologist for generalized weakness, low platelets, and fall this morning ~10 AM. Also c/o of left ankle pain since fall, and burning w/ urination. No mechanical fall or syncope, states fell due to weakness. Did not his head, no LOC, no neck pain. Was supposed to get platelet infusion outpt today, but was sent to ED instead. No cough, CP, SOB, fevers, sore throat, abd pain, diarrhea, or N/V."    PE: well appearing; VSS: CTAB/L; s1 s2 no m/r/g abd soft/NT/ND ext: TTP left lateral malleolus; PERRL; NCAT    Imp: sent in for thrombocytopenia and fall; will check platelets and CT of head/neck and xray of ankle

## 2021-07-30 NOTE — ED ADULT TRIAGE NOTE - CCCP TRG CHIEF CMPLNT
sent from [facility] center for medical eval  has left ACW infusa port/abnormal lab result

## 2021-07-30 NOTE — ED ADULT TRIAGE NOTE - CHIEF COMPLAINT QUOTE
low platelets and low WBC  last chemo 1 week has hx of non Hodgkin's lymphoma   c/o left ankle pain s/p fall this am

## 2021-07-30 NOTE — ED PROVIDER NOTE - OBJECTIVE STATEMENT
79 yo F hx of HTN, ANCA vasculitis (8yrs ago was on Azathioprine, stopped on last admission due to cancer), PE (on Eliquis), and DLBCL with pulmonary involvement found in 4/2021, presenting from outpt clinic as directed by her oncologist for generalized weakness, low platelets, and fall this morning ~10 AM. Also c/o of left ankle pain. 81 yo F hx of HTN, ANCA vasculitis (8yrs ago was on Azathioprine, stopped on last admission due to cancer), PE (on Eliquis), and DLBCL with pulmonary involvement found in 4/2021, presenting from outpt clinic as directed by her oncologist for generalized weakness, low platelets, and fall this morning ~10 AM. Also c/o of left ankle pain since fall, and burning w/ urination. No mechanical fall or syncope, states fell due to weakness. Did not his head, no LOC, no neck pain. Was supposed to get platelet infusion outpt today, but was sent to ED instead. No cough, CP, SOB, fevers, sore throat, abd pain, diarrhea, or N/V.

## 2021-07-30 NOTE — ED PROVIDER NOTE - CLINICAL SUMMARY MEDICAL DECISION MAKING FREE TEXT BOX
81 yo F hx of HTN, ANCA vasculitis (8yrs ago was on Azathioprine, stopped on last admission due to cancer), PE (on Eliquis), and DLBCL with pulmonary involvement found in 4/2021, presenting from outpt clinic as directed by her oncologist for generalized weakness, low platelets, and fall this morning ~10 AM due to weakness w/ L ankle pain. Also reports dysuria. No CP, SOB, HA, LOC. Exam neuro intact, VSS, borderline tachycardic. L ankle TTP. Consider occult infxn, uti, electrolyte abnormality, dehydration, cardiac etiology, arrhythmia, CM, anemia, less likely stroke/ich. Low concern for PE w/ pt already anticoagulated, last dose today AM. Labs, xr, ct, fluids, discuss case w/ oncologist, will reassess.    Onc: Dr. Zonia Madrigal

## 2021-07-30 NOTE — ED PROVIDER NOTE - NS ED ROS FT
Gen: Denies fever. + gen weakness.  HEENT: Denies headache. Denies congestion.  CV: Denies chest pain. Denies lightheadedness.  Skin: +rash (chronic - lower extremities)  Resp: Denies SOB. Denies cough.  GI: Denies abd pain. Denies nausea. Denies vomiting. Denies diarrhea. Denies melena. Denies hematochezia.  Msk: +extremity swelling on left since fall. + left ankle pain.  : +dysuria. Denies hematuria.  Neuro: Denies LOC. Denies dizziness. Denies new numbness/tingling. Denies new focal weakness.  Psych: Denies SI

## 2021-07-30 NOTE — ED PROVIDER NOTE - CCCP TRG CHIEF CMPLNT
sent from [facility] center for medical eval  has left ACW infusa port/abnormal lab result

## 2021-07-31 DIAGNOSIS — D64.9 ANEMIA, UNSPECIFIED: ICD-10-CM

## 2021-07-31 DIAGNOSIS — I26.99 OTHER PULMONARY EMBOLISM WITHOUT ACUTE COR PULMONALE: ICD-10-CM

## 2021-07-31 DIAGNOSIS — K12.1 OTHER FORMS OF STOMATITIS: ICD-10-CM

## 2021-07-31 DIAGNOSIS — C83.30 DIFFUSE LARGE B-CELL LYMPHOMA, UNSPECIFIED SITE: ICD-10-CM

## 2021-07-31 DIAGNOSIS — D61.818 OTHER PANCYTOPENIA: ICD-10-CM

## 2021-07-31 DIAGNOSIS — R53.1 WEAKNESS: ICD-10-CM

## 2021-07-31 DIAGNOSIS — D69.6 THROMBOCYTOPENIA, UNSPECIFIED: ICD-10-CM

## 2021-07-31 DIAGNOSIS — S82.899A OTHER FRACTURE OF UNSPECIFIED LOWER LEG, INITIAL ENCOUNTER FOR CLOSED FRACTURE: ICD-10-CM

## 2021-07-31 DIAGNOSIS — T14.8XXA OTHER INJURY OF UNSPECIFIED BODY REGION, INITIAL ENCOUNTER: ICD-10-CM

## 2021-07-31 DIAGNOSIS — Z29.9 ENCOUNTER FOR PROPHYLACTIC MEASURES, UNSPECIFIED: ICD-10-CM

## 2021-07-31 LAB
ALBUMIN SERPL ELPH-MCNC: 3.5 G/DL — SIGNIFICANT CHANGE UP (ref 3.3–5)
ALP SERPL-CCNC: 52 U/L — SIGNIFICANT CHANGE UP (ref 40–120)
ALT FLD-CCNC: 10 U/L — SIGNIFICANT CHANGE UP (ref 4–33)
ANION GAP SERPL CALC-SCNC: 13 MMOL/L — SIGNIFICANT CHANGE UP (ref 7–14)
APPEARANCE UR: CLEAR — SIGNIFICANT CHANGE UP
AST SERPL-CCNC: 13 U/L — SIGNIFICANT CHANGE UP (ref 4–32)
BACTERIA # UR AUTO: ABNORMAL
BASOPHILS # BLD AUTO: 0.03 K/UL — SIGNIFICANT CHANGE UP (ref 0–0.2)
BASOPHILS NFR BLD AUTO: 3.8 % — HIGH (ref 0–2)
BILIRUB SERPL-MCNC: 0.8 MG/DL — SIGNIFICANT CHANGE UP (ref 0.2–1.2)
BILIRUB UR-MCNC: NEGATIVE — SIGNIFICANT CHANGE UP
BUN SERPL-MCNC: 15 MG/DL — SIGNIFICANT CHANGE UP (ref 7–23)
CALCIUM SERPL-MCNC: 8.7 MG/DL — SIGNIFICANT CHANGE UP (ref 8.4–10.5)
CHLORIDE SERPL-SCNC: 108 MMOL/L — HIGH (ref 98–107)
CO2 SERPL-SCNC: 18 MMOL/L — LOW (ref 22–31)
COLOR SPEC: YELLOW — SIGNIFICANT CHANGE UP
CREAT SERPL-MCNC: 0.62 MG/DL — SIGNIFICANT CHANGE UP (ref 0.5–1.3)
CULTURE RESULTS: SIGNIFICANT CHANGE UP
DIFF PNL FLD: NEGATIVE — SIGNIFICANT CHANGE UP
EOSINOPHIL # BLD AUTO: 0.02 K/UL — SIGNIFICANT CHANGE UP (ref 0–0.5)
EOSINOPHIL NFR BLD AUTO: 2.5 % — SIGNIFICANT CHANGE UP (ref 0–6)
EPI CELLS # UR: 7 /HPF — HIGH (ref 0–5)
GLUCOSE SERPL-MCNC: 101 MG/DL — HIGH (ref 70–99)
GLUCOSE UR QL: NEGATIVE — SIGNIFICANT CHANGE UP
HCT VFR BLD CALC: 24.7 % — LOW (ref 34.5–45)
HGB BLD-MCNC: 8.2 G/DL — LOW (ref 11.5–15.5)
HYALINE CASTS # UR AUTO: 14 /LPF — HIGH (ref 0–7)
IANC: 0.32 K/UL — LOW (ref 1.5–8.5)
IMM GRANULOCYTES NFR BLD AUTO: 1.3 % — SIGNIFICANT CHANGE UP (ref 0–1.5)
KETONES UR-MCNC: NEGATIVE — SIGNIFICANT CHANGE UP
LEUKOCYTE ESTERASE UR-ACNC: NEGATIVE — SIGNIFICANT CHANGE UP
LYMPHOCYTES # BLD AUTO: 0.36 K/UL — LOW (ref 1–3.3)
LYMPHOCYTES # BLD AUTO: 45 % — HIGH (ref 13–44)
MAGNESIUM SERPL-MCNC: 1.9 MG/DL — SIGNIFICANT CHANGE UP (ref 1.6–2.6)
MCHC RBC-ENTMCNC: 33.2 GM/DL — SIGNIFICANT CHANGE UP (ref 32–36)
MCHC RBC-ENTMCNC: 35.8 PG — HIGH (ref 27–34)
MCV RBC AUTO: 107.9 FL — HIGH (ref 80–100)
MONOCYTES # BLD AUTO: 0.06 K/UL — SIGNIFICANT CHANGE UP (ref 0–0.9)
MONOCYTES NFR BLD AUTO: 7.5 % — SIGNIFICANT CHANGE UP (ref 2–14)
NEUTROPHILS # BLD AUTO: 0.32 K/UL — LOW (ref 1.8–7.4)
NEUTROPHILS NFR BLD AUTO: 39.9 % — LOW (ref 43–77)
NITRITE UR-MCNC: NEGATIVE — SIGNIFICANT CHANGE UP
NRBC # BLD: 0 /100 WBCS — SIGNIFICANT CHANGE UP
NRBC # FLD: 0 K/UL — SIGNIFICANT CHANGE UP
PH UR: 6 — SIGNIFICANT CHANGE UP (ref 5–8)
PHOSPHATE SERPL-MCNC: 2.7 MG/DL — SIGNIFICANT CHANGE UP (ref 2.5–4.5)
PLATELET # BLD AUTO: 40 K/UL — LOW (ref 150–400)
POTASSIUM SERPL-MCNC: 3.3 MMOL/L — LOW (ref 3.5–5.3)
POTASSIUM SERPL-SCNC: 3.3 MMOL/L — LOW (ref 3.5–5.3)
PROT SERPL-MCNC: 6.3 G/DL — SIGNIFICANT CHANGE UP (ref 6–8.3)
PROT UR-MCNC: ABNORMAL
RBC # BLD: 2.29 M/UL — LOW (ref 3.8–5.2)
RBC # FLD: 15.2 % — HIGH (ref 10.3–14.5)
RBC CASTS # UR COMP ASSIST: 2 /HPF — SIGNIFICANT CHANGE UP (ref 0–4)
SARS-COV-2 RNA SPEC QL NAA+PROBE: SIGNIFICANT CHANGE UP
SODIUM SERPL-SCNC: 139 MMOL/L — SIGNIFICANT CHANGE UP (ref 135–145)
SP GR SPEC: 1.02 — SIGNIFICANT CHANGE UP (ref 1.01–1.02)
SPECIMEN SOURCE: SIGNIFICANT CHANGE UP
UROBILINOGEN FLD QL: SIGNIFICANT CHANGE UP
WBC # BLD: 0.8 K/UL — CRITICAL LOW (ref 3.8–10.5)
WBC # FLD AUTO: 0.8 K/UL — CRITICAL LOW (ref 3.8–10.5)
WBC UR QL: 13 /HPF — HIGH (ref 0–5)

## 2021-07-31 PROCEDURE — 73600 X-RAY EXAM OF ANKLE: CPT | Mod: 26,LT

## 2021-07-31 PROCEDURE — 99223 1ST HOSP IP/OBS HIGH 75: CPT | Mod: GC

## 2021-07-31 PROCEDURE — 72125 CT NECK SPINE W/O DYE: CPT | Mod: 26

## 2021-07-31 PROCEDURE — 99222 1ST HOSP IP/OBS MODERATE 55: CPT

## 2021-07-31 PROCEDURE — 70450 CT HEAD/BRAIN W/O DYE: CPT | Mod: 26

## 2021-07-31 PROCEDURE — 73590 X-RAY EXAM OF LOWER LEG: CPT | Mod: 26,LT

## 2021-07-31 RX ORDER — CITALOPRAM 10 MG/1
10 TABLET, FILM COATED ORAL DAILY
Refills: 0 | Status: DISCONTINUED | OUTPATIENT
Start: 2021-07-31 | End: 2021-08-06

## 2021-07-31 RX ORDER — ACETAMINOPHEN 500 MG
660 TABLET ORAL EVERY 6 HOURS
Refills: 0 | Status: DISCONTINUED | OUTPATIENT
Start: 2021-07-31 | End: 2021-08-06

## 2021-07-31 RX ORDER — FERROUS SULFATE 325(65) MG
325 TABLET ORAL DAILY
Refills: 0 | Status: DISCONTINUED | OUTPATIENT
Start: 2021-07-31 | End: 2021-08-06

## 2021-07-31 RX ORDER — DIPHENHYDRAMINE HYDROCHLORIDE AND LIDOCAINE HYDROCHLORIDE AND ALUMINUM HYDROXIDE AND MAGNESIUM HYDRO
10 KIT THREE TIMES A DAY
Refills: 0 | Status: DISCONTINUED | OUTPATIENT
Start: 2021-07-31 | End: 2021-08-06

## 2021-07-31 RX ORDER — NYSTATIN 500MM UNIT
5000 POWDER (EA) MISCELLANEOUS
Refills: 0 | Status: DISCONTINUED | OUTPATIENT
Start: 2021-07-31 | End: 2021-07-31

## 2021-07-31 RX ORDER — POTASSIUM CHLORIDE 20 MEQ
10 PACKET (EA) ORAL
Refills: 0 | Status: DISCONTINUED | OUTPATIENT
Start: 2021-07-31 | End: 2021-07-31

## 2021-07-31 RX ORDER — MAGNESIUM SULFATE 500 MG/ML
1 VIAL (ML) INJECTION ONCE
Refills: 0 | Status: COMPLETED | OUTPATIENT
Start: 2021-07-31 | End: 2021-07-31

## 2021-07-31 RX ORDER — NYSTATIN 500MM UNIT
500000 POWDER (EA) MISCELLANEOUS
Refills: 0 | Status: DISCONTINUED | OUTPATIENT
Start: 2021-07-31 | End: 2021-08-06

## 2021-07-31 RX ORDER — LANOLIN ALCOHOL/MO/W.PET/CERES
3 CREAM (GRAM) TOPICAL AT BEDTIME
Refills: 0 | Status: DISCONTINUED | OUTPATIENT
Start: 2021-07-31 | End: 2021-08-06

## 2021-07-31 RX ORDER — METOCLOPRAMIDE HCL 10 MG
5 TABLET ORAL EVERY 8 HOURS
Refills: 0 | Status: DISCONTINUED | OUTPATIENT
Start: 2021-07-31 | End: 2021-08-06

## 2021-07-31 RX ORDER — POTASSIUM CHLORIDE 20 MEQ
20 PACKET (EA) ORAL ONCE
Refills: 0 | Status: COMPLETED | OUTPATIENT
Start: 2021-07-31 | End: 2021-07-31

## 2021-07-31 RX ORDER — PREGABALIN 225 MG/1
1000 CAPSULE ORAL DAILY
Refills: 0 | Status: DISCONTINUED | OUTPATIENT
Start: 2021-07-31 | End: 2021-08-06

## 2021-07-31 RX ORDER — FOLIC ACID 0.8 MG
1 TABLET ORAL DAILY
Refills: 0 | Status: DISCONTINUED | OUTPATIENT
Start: 2021-07-31 | End: 2021-08-06

## 2021-07-31 RX ORDER — VALACYCLOVIR 500 MG/1
1000 TABLET, FILM COATED ORAL
Refills: 0 | Status: DISCONTINUED | OUTPATIENT
Start: 2021-07-31 | End: 2021-08-06

## 2021-07-31 RX ADMIN — Medication 325 MILLIGRAM(S): at 13:18

## 2021-07-31 RX ADMIN — Medication 100 MILLIEQUIVALENT(S): at 10:59

## 2021-07-31 RX ADMIN — CITALOPRAM 10 MILLIGRAM(S): 10 TABLET, FILM COATED ORAL at 18:05

## 2021-07-31 RX ADMIN — Medication 3 MILLIGRAM(S): at 21:19

## 2021-07-31 RX ADMIN — Medication 100 GRAM(S): at 09:57

## 2021-07-31 RX ADMIN — Medication 1 MILLIGRAM(S): at 13:18

## 2021-07-31 RX ADMIN — Medication 500000 UNIT(S): at 18:13

## 2021-07-31 RX ADMIN — Medication 500000 UNIT(S): at 23:33

## 2021-07-31 RX ADMIN — PREGABALIN 1000 MICROGRAM(S): 225 CAPSULE ORAL at 18:05

## 2021-07-31 RX ADMIN — VALACYCLOVIR 1000 MILLIGRAM(S): 500 TABLET, FILM COATED ORAL at 18:06

## 2021-07-31 RX ADMIN — DIPHENHYDRAMINE HYDROCHLORIDE AND LIDOCAINE HYDROCHLORIDE AND ALUMINUM HYDROXIDE AND MAGNESIUM HYDRO 10 MILLILITER(S): KIT at 13:17

## 2021-07-31 RX ADMIN — Medication 1 DROP(S): at 13:17

## 2021-07-31 RX ADMIN — Medication 20 MILLIEQUIVALENT(S): at 13:17

## 2021-07-31 RX ADMIN — DIPHENHYDRAMINE HYDROCHLORIDE AND LIDOCAINE HYDROCHLORIDE AND ALUMINUM HYDROXIDE AND MAGNESIUM HYDRO 10 MILLILITER(S): KIT at 21:19

## 2021-07-31 RX ADMIN — Medication 1 DROP(S): at 21:18

## 2021-07-31 NOTE — H&P ADULT - PROBLEM SELECTOR PLAN 4
- over last few days, more weakness than usual. difficulty walking, sleeping more than usual.   - has not felt this tired after previous chemo cycles - feels extreme weakness this time. seen by ortho  - Pain control - prefers tylenol   - NWB LLE in trilaminar splint   - Keep splint clean, dry and intact until follow up  - Cane/crutches/walker as needed  - Ice/elevation  - Patient counseled on possible need for operative intervention  - Follow up with Dr. Greene in 1 week, call office for appointment

## 2021-07-31 NOTE — PHYSICAL THERAPY INITIAL EVALUATION ADULT - PATIENT PROFILE REVIEW, REHAB EVAL
PT orders received: no formal activity order. Consult with RN Dayami CHRISTIE, pt may participate in PT evaluation./yes

## 2021-07-31 NOTE — PHYSICAL THERAPY INITIAL EVALUATION ADULT - LEVEL OF INDEPENDENCE: GAIT, REHAB EVAL
Pt performed sit to stand transfer 3x, unable to achieve full standing position without compromising NWB/unable to perform

## 2021-07-31 NOTE — PHYSICAL THERAPY INITIAL EVALUATION ADULT - PERTINENT HX OF CURRENT PROBLEM, REHAB EVAL
Patient is a 80 year old  admitted to [facility] with thrombocytopenia. Pt also s/p fall with subsequent ankle pain. Pt sustained left ankle fracture (x-ray: +obliquely oriented distal fibular fracture), s/p closed reduction and splinting. PMH: HTN, non hodgkins lymphoma.

## 2021-07-31 NOTE — PROGRESS NOTE ADULT - PROBLEM SELECTOR PLAN 2
- EBV+ DLBCL - diagnosed 4/13/21 after presenting to the ED with SOB/CP found to have RML RLL PE and b/l LE DVT along with lung masses up to 8.6cm s/p VATS/biopsy confirming EBV+ DLBCL. s/p chemoport.   - PET/CT:  Status post wedge resection of left upper lobe lung nodule, as compared to CT dated 4/13/2021. Additional FDG-avid, partially necrotic bilateral lung masses and mildly avid groundglass opacities are not significantly changed as compared to prior CT, compatible with biopsy-proven lymphoma. 2. Few mildly FDG-avid, mildly enlarged mediastinal and bilateral hilar lymph nodes are nonspecific. 3. Previously seen indeterminate3.1 cm lesion in the upper pole of right kidney is FDG-avid.  4. FDG-avid left adrenal nodule is indeterminate.   - contact onc in am - EBV+ DLBCL - diagnosed 4/13/21 after presenting to the ED with SOB/CP found to have RML RLL PE and b/l LE DVT along with lung masses up to 8.6cm s/p VATS/biopsy confirming EBV+ DLBCL. s/p chemoport.   - PET/CT:  Status post wedge resection of left upper lobe lung nodule, as compared to CT dated 4/13/2021. Additional FDG-avid, partially necrotic bilateral lung masses and mildly avid groundglass opacities are not significantly changed as compared to prior CT, compatible with biopsy-proven lymphoma. 2. Few mildly FDG-avid, mildly enlarged mediastinal and bilateral hilar lymph nodes are nonspecific. 3. Previously seen indeterminate3.1 cm lesion in the upper pole of right kidney is FDG-avid.  4. FDG-avid left adrenal nodule is indeterminate.   - no inpatient tx per heme onc

## 2021-07-31 NOTE — PROVIDER CONTACT NOTE (CRITICAL VALUE NOTIFICATION) - BACKGROUND
81yo F with hx of lymphoma currently on chemo. Admitted for weakness and fall with left leg fracture

## 2021-07-31 NOTE — H&P ADULT - NSHPREVIEWOFSYSTEMS_GEN_ALL_CORE
CONSTITUTIONAL: No fever, weight loss, or fatigue  EYES: No eye pain, visual disturbances, or discharge  ENMT:  No difficulty hearing, tinnitus  RESPIRATORY: No cough, wheezing  CARDIOVASCULAR: No chest pain, palpitations  GASTROINTESTINAL: No abdominal or epigastric pain  GENITOURINARY: dysuria 2 days ago, lasted for a day. no hematuria  NEUROLOGICAL: no headache, no focal neurological deficits  SKIN: mucus ulcers, no itching  LYMPH NODES: No enlarged glands  ENDOCRINE: No heat or cold intolerance  MUSCULOSKELETAL: No joint pain or swelling CONSTITUTIONAL: No fever, weight loss, or fatigue  EYES: No eye pain, visual disturbances, or discharge  ENMT:  No difficulty hearing, tinnitus  RESPIRATORY: No cough, wheezing  CARDIOVASCULAR: No chest pain, palpitations  GASTROINTESTINAL: No abdominal pain, no vomiting  GENITOURINARY: dysuria 2 days ago, lasted for a day. no hematuria  NEUROLOGICAL: no headache, no focal neurological deficits  SKIN: mucous ulcers, no itching  LYMPH NODES: No enlarged glands  ENDOCRINE: No heat or cold intolerance  MUSCULOSKELETAL: +ankle fracture/pain, +joint swelling

## 2021-07-31 NOTE — PROGRESS NOTE ADULT - PROBLEM SELECTOR PLAN 3
seen by ortho  - Pain control - prefers tylenol   - NWB LLE in trilaminar splint   - Keep splint clean, dry and intact until follow up  - Cane/crutches/walker as needed  - Ice/elevation  - Patient counseled on possible need for operative intervention  - Follow up with Dr. Greene in 1 week, call office for appointment

## 2021-07-31 NOTE — H&P ADULT - ASSESSMENT
Ms. Higgins is a 79 y/o woman hxHTN, ANCA vasculitis (8yrs ago was on Azathioprine, stopped on last admission due to cancer), PE (on apixaban), DLBCL with pulmonary involvement found in 4/2021 presenting from outpt onc clinic for weakness, thrombocytopenia, fall today am, dysuria.    Ms. Higgins is a 79 y/o woman hxHTN, ANCA vasculitis (8yrs ago was on Azathioprine, stopped on last admission due to cancer), PE (on apixaban), DLBCL with pulmonary involvement found in 4/2021 presenting from outpt onc clinic for pancytopenia, fall.

## 2021-07-31 NOTE — PHYSICAL THERAPY INITIAL EVALUATION ADULT - RANGE OF MOTION EXAMINATION, REHAB EVAL
unable to assess left ROM secondary to splint/bilateral upper extremity ROM was WFL (within functional limits)/Right LE ROM was WFL (within functional limits)

## 2021-07-31 NOTE — H&P ADULT - PROBLEM SELECTOR PLAN 1
- platelet drop from 268 to 7  - s/p 1U plt transfusion in ED - platelet drop from 268 to 7  - s/p 1U plt transfusion in ED  - f/u repeat cbc in am - transfuse as needed  - CTH without bleeding - platelet drop from 268 to 7; no active bleed. CTH without bleeding  - s/p 1U plt transfusion in ED  - f/u repeat cbc in am - transfuse as needed  - consult heme/onc in am  - cbc qd   - plt goal >20 - low wbc, anemia, thrombocytopenia. no active bleeding. CTH without hemorrhage. likely 2/2 chemo on July 22nd.   - presents w extreme weakness, fatigue. no infectious signs or sx currently  - s/p 1U platelet in ED, monitor cbc at least qd and aim plt >20. monitor for bleeding.   - consult heme/onc in am - low wbc, anemia, thrombocytopenia. no active bleeding. CTH without hemorrhage. likely 2/2 chemo on July 22nd.   - presents w extreme weakness, fatigue. no infectious signs or sx currently  - s/p 1U platelet in ED, monitor cbc at least qd and aim plt >20. monitor for bleeding.   - consult heme/onc in am  -  - discuss if neupogen to be given with heme

## 2021-07-31 NOTE — CONSULT NOTE ADULT - NSCONSULTADDITIONALINFOA_GEN_ALL_CORE
Patient seen at bedside. Case discussed with Dr Holland. Plan as above.   80f with DLBCL s/p C4 RCHOP 7/22, presenting s/p a fall, found to have R ankle fx. Also has pain and swelling in left knee but has refused imaging, says she prefers not to know if she has fx there too.   Significant cytopenia s/p R-CHOP. Has had Neulasta onpro.   No indication for neupogen at this time.   Transfuse plt as needed for plt<10k, if febrile and plt<20k and in case of active bleeding.  Hold AC while plt<50k.  Consider imaging right leg if pt agrees.

## 2021-07-31 NOTE — H&P ADULT - NSHPLABSRESULTS_GEN_ALL_CORE
LABS:                        8.4    0.50  )-----------( 7        ( 30 Jul 2021 21:05 )             25.6     30 Jul 2021 21:05    138    |  106    |  20     ----------------------------<  167    3.6     |  17     |  0.70     Ca    8.4        30 Jul 2021 21:05  Mg     1.90      30 Jul 2021 21:05    TPro  5.6    /  Alb  3.3    /  TBili  0.4    /  DBili  x      /  AST  17     /  ALT  7      /  AlkPhos  47     30 Jul 2021 21:05    PT/INR - ( 30 Jul 2021 21:05 )   PT: 18.1 sec;   INR: 1.62 ratio         PTT - ( 30 Jul 2021 21:05 )  PTT:29.9 sec  CAPILLARY BLOOD GLUCOSE        BLOOD CULTURE    RADIOLOGY & ADDITIONAL TESTS:    Imaging Personally Reviewed:  [ ] YES Labs reviewed by me:                        8.4    0.50  )-----------( 7        ( 30 Jul 2021 21:05 )             25.6     30 Jul 2021 21:05    138    |  106    |  20     ----------------------------<  167    3.6     |  17     |  0.70     Ca    8.4        30 Jul 2021 21:05  Mg     1.90      30 Jul 2021 21:05    TPro  5.6    /  Alb  3.3    /  TBili  0.4    /  DBili  x      /  AST  17     /  ALT  7      /  AlkPhos  47     30 Jul 2021 21:05    PT/INR - ( 30 Jul 2021 21:05 )   PT: 18.1 sec;   INR: 1.62 ratio         PTT - ( 30 Jul 2021 21:05 )  PTT:29.9 sec  CAPILLARY BLOOD GLUCOSE    Troponin 13--15    EKG ordered    Imaging reviewed:  Head CT: No acute intracranial hemorrhage or displaced skull fracture. Additional findings as described. If clinically indicated, short-term follow-up or MRI may be obtained for further evaluation.    Cervical spine CT: Osteopenia without obvious acute fracture or traumatic malalignment in the cervical spine. If there is clinical suspicion for acute fracture or ligamentous/cord injury, MRI may be obtained for further evaluation.    CXR reviewed and interpreted clear lungs    X ray ankle Acute obliquely oriented minimally displaced fracture of the distal fibular metadiaphysis.

## 2021-07-31 NOTE — PROGRESS NOTE ADULT - PROBLEM SELECTOR PLAN 4
- over last few days, more weakness than usual. difficulty walking, sleeping more than usual.   - has not felt this tired after previous chemo cycles - feels extreme weakness this time.

## 2021-07-31 NOTE — PROGRESS NOTE ADULT - PROBLEM SELECTOR PLAN 1
- platelet drop from 268 to 7  - s/p 1U plt transfusion in ED  - f/u repeat cbc in am - transfuse as needed  - CTH without bleeding i/s/o recent chemo on 7/22. Platelet 268 on 7/22/21.    - s/p 1U plt transfusion in ED  - post-transfusion cbc showed plt of 40, up from 7   - CTH without bleeding

## 2021-07-31 NOTE — PROGRESS NOTE ADULT - SUBJECTIVE AND OBJECTIVE BOX
PROGRESS NOTE:   Authored by Dr. Phyllis Jorgensen MD (PGY-1). Pager [facility] [phone]/ [facility]     Patient is a 80y old  Female who presents with a chief complaint of     SUBJECTIVE / OVERNIGHT EVENTS:  No acute events overnight.     ADDITIONAL REVIEW OF SYSTEMS:  Patient denies fevers, chills, chest pain, shortness of breath, nausea, abdominal pain, diarrhea, constipation, dysuria, leg swelling, headache, light headedness.    MEDICATIONS  (STANDING):  artificial tears (preservative free) Ophthalmic Solution 1 Drop(s) Both EYES three times a day  melatonin 3 milliGRAM(s) Oral at bedtime    MEDICATIONS  (PRN):  acetaminophen    Suspension .. 660 milliGRAM(s) Oral every 6 hours PRN Mild Pain (1 - 3), Moderate Pain (4 - 6)      CAPILLARY BLOOD GLUCOSE        I&O's Summary      PHYSICAL EXAM:  Vital Signs Last 24 Hrs  T(C): 36.8 (2021 03:08), Max: 36.9 (2021 00:40)  T(F): 98.3 (2021 03:08), Max: 98.5 (2021 00:40)  HR: 94 (2021 03:08) (85 - 99)  BP: 141/60 (2021 03:08) (101/87 - 146/53)  BP(mean): --  RR: 19 (2021 03:08) (16 - 20)  SpO2: 100% (2021 03:08) (97% - 100%)    CONSTITUTIONAL: NAD, well-developed  RESPIRATORY: Normal respiratory effort; lungs are clear to auscultation bilaterally  CARDIOVASCULAR: Regular rate and rhythm, normal S1 and S2, no murmur/rub/gallop; No lower extremity edema; Peripheral pulses are 2+ bilaterally  ABDOMEN: Nontender to palpation, normoactive bowel sounds, no rebound/guarding; No hepatosplenomegaly  MUSCLOSKELETAL: no clubbing or cyanosis of digits; no joint swelling or tenderness to palpation  PSYCH: A+O to person, place, and time; affect appropriate    LABS:                        8.4    0.50  )-----------( 7        ( 2021 21:05 )             25.6     07-30    138  |  106  |  20  ----------------------------<  167<H>  3.6   |  17<L>  |  0.70    Ca    8.4      2021 21:05  Mg     1.90         TPro  5.6<L>  /  Alb  3.3  /  TBili  0.4  /  DBili  x   /  AST  17  /  ALT  7   /  AlkPhos  47      PT/INR - ( 2021 21:05 )   PT: 18.1 sec;   INR: 1.62 ratio         PTT - ( 2021 21:05 )  PTT:29.9 sec      Urinalysis Basic - ( 2021 00:54 )    Color: Yellow / Appearance: Clear / S.024 / pH: x  Gluc: x / Ketone: Negative  / Bili: Negative / Urobili: <2 mg/dL   Blood: x / Protein: Trace / Nitrite: Negative   Leuk Esterase: Negative / RBC: 2 /HPF / WBC 13 /HPF   Sq Epi: x / Non Sq Epi: 7 /HPF / Bacteria: Occasional          Tele Reviewed:    RADIOLOGY & ADDITIONAL TESTS:  Results Reviewed:   Imaging Personally Reviewed:  Electrocardiogram Personally Reviewed:     PROGRESS NOTE:   Authored by Dr. Phyllis Jorgensen MD ([id number]-1). Pager [facility] [phone]/ [facility]     Patient is a 80y old  Female who presents with a chief complaint of generalized weakness, thrombocytopenia    SUBJECTIVE / OVERNIGHT EVENTS:    Patient endorses odynophagia due to mouth sores. Reports decreased appetite the past couple of days. Patient says weakness has improved since yesterday. Denies L ankle pain. Endorses right knee pain and swelling s/p fall.     Patient denies hematuria, gum bleeding, epistaxis, bloody stools, hemoptysis, dysuria.     ADDITIONAL REVIEW OF SYSTEMS:  Patient denies fevers, chills, chest pain, shortness of breath, nausea, abdominal pain, diarrhea, constipation, dysuria, leg swelling, headache, light headedness.    MEDICATIONS  (STANDING):  artificial tears (preservative free) Ophthalmic Solution 1 Drop(s) Both EYES three times a day  melatonin 3 milliGRAM(s) Oral at bedtime    MEDICATIONS  (PRN):  acetaminophen    Suspension .. 660 milliGRAM(s) Oral every 6 hours PRN Mild Pain (1 - 3), Moderate Pain (4 - 6)      CAPILLARY BLOOD GLUCOSE        I&O's Summary      PHYSICAL EXAM:  Vital Signs Last 24 Hrs  T(C): 36.8 (2021 03:08), Max: 36.9 (2021 00:40)  T(F): 98.3 (2021 03:08), Max: 98.5 (2021 00:40)  HR: 94 (2021 03:08) (85 - 99)  BP: 141/60 (2021 03:08) (101/87 - 146/53)  BP(mean): --  RR: 19 (2021 03:08) (16 - 20)  SpO2: 100% (2021 03:08) (97% - 100%)    CONSTITUTIONAL: NAD, well-developed  RESPIRATORY: Normal respiratory effort; lungs are clear to auscultation bilaterally  CARDIOVASCULAR: Regular rate and rhythm, normal S1 and S2, no murmur/rub/gallop; No lower extremity edema; Peripheral pulses are 2+ bilaterally  ABDOMEN: Nontender to palpation, normoactive bowel sounds, no rebound/guarding; No hepatosplenomegaly  MUSCULOSKELETAL: LLE splinted and wrapped in ACE, right knee w/ mild swelling, no erythema, no tenderness to palpation, no ecchymosis,    PSYCH: A+O to person, place, and time; affect appropriate    LABS:                        8.4    0.50  )-----------( 7        ( 2021 21:05 )             25.6         138  |  106  |  20  ----------------------------<  167<H>  3.6   |  17<L>  |  0.70    Ca    8.4      2021 21:05  Mg     1.90         TPro  5.6<L>  /  Alb  3.3  /  TBili  0.4  /  DBili  x   /  AST  17  /  ALT  7   /  AlkPhos  47      PT/INR - ( 2021 21:05 )   PT: 18.1 sec;   INR: 1.62 ratio         PTT - ( 2021 21:05 )  PTT:29.9 sec      Urinalysis Basic - ( 2021 00:54 )    Color: Yellow / Appearance: Clear / S.024 / pH: x  Gluc: x / Ketone: Negative  / Bili: Negative / Urobili: <2 mg/dL   Blood: x / Protein: Trace / Nitrite: Negative   Leuk Esterase: Negative / RBC: 2 /HPF / WBC 13 /HPF   Sq Epi: x / Non Sq Epi: 7 /HPF / Bacteria: Occasional          Tele Reviewed:    RADIOLOGY & ADDITIONAL TESTS:  Results Reviewed:   Imaging Personally Reviewed:  Electrocardiogram Personally Reviewed:

## 2021-07-31 NOTE — CONSULT NOTE ADULT - SUBJECTIVE AND OBJECTIVE BOX
Hematology/Oncology Consult Note    HPI:  79 y/o woman hx HTN, ANCA vasculitis (8yrs ago was on Azathioprine, PE + b/l DVT on apixaban, stopped on last admission due to cancer), DLBCL with pulmonary involvement found in 4/2021 on [facility] presenting from outpt onc clinic for thrombocytopenia, fall, ankle pain. She received chemo on July 22nd. Supposed to get platelet transfusion yesterday. Found to have low plt there (drop from >260 to 9, sent to ED. Pt is not bleeding but is on apixaban 5mg bid, CTH negative. Plt never been so low before. No fevers/chills/chest pain/sob/diarrhea/abdominal pain. She did have a unwitnessed fall yesterday am while walking to kitchen, had generalized weakness and fatigue but denies chest pain/LH/palpitations/dyspnea/LOC but says she is unable to remember events clearly. Fall c/b L ankle fracture with pain. Since last chemo july 22nd, she has felt extreme weakness, sleeping a lot, fatigue.   She had dysuria for a day couple days ago, resolved.     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 Patient also reported travel to [place] in car (~10 hrs ride). She had a CT angio done which showed right lower lobe and middle lobe pulmonary arterial emboli. She was noted to have B/l LE DVTs, and placed on Eliquis. In addition pt was found to have large b/l lung masses measuring up to 8.6cm. Pulm was consulted and pt underwent biopsy/VATs on 4/16/21, path showed EBV+ diffuse large B-cell lymphoma. Lymphoma cells are B cells, positive VEENA, CD20, CD79a, Pax5 (dim), Mum-1, Bcl2, CD43,variable CD30, negative CD5, CD10, CD23, cyclin D1, p53 (<5%), cMYC (<20%). Ki-67 proliferation index is high (>90%). CD3+ T cells comprise a minor population in the infiltrate.  stains few plasma cells; B cells are lambda-restricted. Cam 5.2, P40, TTF-1, synaptophysin, chromogranin, PAX-8 and MALU-3 were performed on block 2C. They are negative in lymphoma cells. Pt underwent C1 of R CHOP on 05/19/21 and IT MTX 5/20/21 with LP negative for lymphoma involvement.     Recent PET 06/2021 showed decrease in size of mass. She received Cycle 4 of R-CHOP on 7/22/21. Pending PET after C4.        Allergies:  codeine (Nausea)  penicillin (Hives)          MEDICATIONS  (STANDING):  artificial tears (preservative free) Ophthalmic Solution 1 Drop(s) Both EYES three times a day  citalopram 10 milliGRAM(s) Oral daily  FIRST- Mouthwash  BLM 10 milliLiter(s) Swish and Spit three times a day  magnesium sulfate  IVPB 1 Gram(s) IV Intermittent once  melatonin 3 milliGRAM(s) Oral at bedtime  nystatin    Suspension 5000 Unit(s) Oral two times a day  potassium chloride  10 mEq/100 mL IVPB 10 milliEquivalent(s) IV Intermittent every 1 hour  valACYclovir 1000 milliGRAM(s) Oral two times a day    MEDICATIONS  (PRN):  acetaminophen    Suspension .. 660 milliGRAM(s) Oral every 6 hours PRN Mild Pain (1 - 3), Moderate Pain (4 - 6)  metoclopramide 5 milliGRAM(s) Oral every 8 hours PRN nausea/vomiting      PAST MEDICAL & SURGICAL HISTORY:  ANCA-associated vasculitis  Anxiety and depression  Pulmonary embolism  DVT, lower extremity  GERD (gastroesophageal reflux disease)  Hyperlipidemia  Lung mass  s/p left wedge resection  DLBCL (diffuse large B cell lymphoma)  History of appendectomy        FAMILY HISTORY:  FH: lung cancer (Sibling)    FH: CAD (coronary artery disease) (Sibling)        SOCIAL HISTORY: No EtOH, no tobacco    REVIEW OF SYSTEMS:    CONSTITUTIONAL: No weakness, fevers or chills  EYES/ENT: No visual changes;  No vertigo or throat pain   NECK: No pain or stiffness  RESPIRATORY: No cough, wheezing, hemoptysis; No shortness of breath  CARDIOVASCULAR: No chest pain or palpitations  GASTROINTESTINAL: No abdominal or epigastric pain. No nausea, vomiting, or hematemesis; No diarrhea or constipation. No melena or hematochezia.  GENITOURINARY: No dysuria, frequency or hematuria  NEUROLOGICAL: No numbness or weakness  SKIN: No itching, burning, rashes, or lesions   All other review of systems is negative unless indicated above.    Height (cm): 154.9 (07-30 @ 18:57)  Weight (kg): 65.8 (07-31 @ 03:08)  BMI (kg/m2): 27.4 (07-31 @ 03:08)  BSA (m2): 1.65 (07-31 @ 03:08)    T(F): 98.2 (07-31-21 @ 07:00), Max: 98.5 (07-31-21 @ 00:40)  HR: 88 (07-31-21 @ 07:00) (85 - 99)  BP: 133/76 (07-31-21 @ 07:00)  RR: 18 (07-31-21 @ 07:00)  SpO2: 100% (07-31-21 @ 07:00) (97% - 100%)    Physical Exam  GENERAL: NAD, well-developed  HEAD:  Atraumatic, Normocephalic  EYES: EOMI, PERRLA, conjunctiva and sclera clear  NECK: Supple, No JVD  CHEST/LUNG: Clear to auscultation bilaterally; No wheeze  HEART: Regular rate and rhythm; No murmurs, rubs, or gallops  ABDOMEN: Soft, Nontender, Nondistended; Bowel sounds present  EXTREMITIES:  2+ Peripheral Pulses, No clubbing, cyanosis, or edema  NEUROLOGY: non-focal  SKIN: No rashes or lesions                          8.2    0.80  )-----------( 40       ( 31 Jul 2021 07:32 )             24.7       07-31    139  |  108<H>  |  15  ----------------------------<  101<H>  3.3<L>   |  18<L>  |  0.62    Ca    8.7      31 Jul 2021 07:32  Phos  2.7     07-31  Mg     1.90     07-31    TPro  6.3  /  Alb  3.5  /  TBili  0.8  /  DBili  x   /  AST  13  /  ALT  10  /  AlkPhos  52  07-31      Magnesium, Serum: 1.90 mg/dL (07-31 @ 07:32)  Phosphorus Level, Serum: 2.7 mg/dL (07-31 @ 07:32)  Magnesium, Serum: 1.90 mg/dL (07-30 @ 21:05)        Imaging:  < from: CT Head No Cont (07.31.21 @ 00:09) >  IMPRESSION:    Head CT: No acute intracranial hemorrhage or displaced skull fracture. Additional findings as described. If clinically indicated, short-term follow-up or MRI may be obtained for further evaluation.    Cervical spine CT: Osteopenia without obvious acute fracture or traumatic malalignment in the cervical spine. If there is clinical suspicion for acute fracture or ligamentous/cord injury, MRI may be obtained for further evaluation.    < end of copied text >  < from: Xray Chest 1 View- PORTABLE-Urgent (Xray Chest 1 View- PORTABLE-Urgent .) (07.30.21 @ 21:45) >      INTERPRETATION:  clear lungs      < from: Xray Foot AP + Lateral + Oblique, Left (07.30.21 @ 21:44) >  IMPRESSION:    Acute obliquely oriented minimally displaced fracture of the distal fibular metadiaphysis.      < from: NM PET/CT Onc FDG Skull to Thigh, Subsq (06.25.21 @ 18:31) >    IMPRESSION: Compared to FDG-PET/CT scan dated 5/10/2021:    1. Mildly FDG-avid bilateral lung nodules are decreased in size and metabolism, compatible with a partialresponse to interval therapy (Deauville 4). Resolution of mildly FDG-avid bilateral groundglass pulmonary opacities.  2. Few minimally FDG-avid mediastinal and bilateral hilar lymph nodes are similar in size and decreased in metabolism.  3. Resolution of FDG activity associated with right upper pole renal lesion which is better delineated on prior contrast-enhanced CT and interval MRI.  4. Resolution of FDG-avid left adrenal nodule.  5. Resolution of increased FDG activity in anterior aspect of left 9th rib.  6. Resolution of linear focus of increased FDG activity in lateral aspect of left chest wall.  7. Complete opacification of left maxillary sinus with mild, peripheral FDG avidity, unchanged. Correlate clinically for acute sinusitis. Hematology/Oncology Consult Note    HPI:  81 y/o woman hx HTN, ANCA vasculitis (8yrs ago was on Azathioprine, PE + b/l DVT on apixaban, stopped on last admission due to cancer), DLBCL with pulmonary involvement found in 4/2021 on [facility] presenting from outpt onc clinic for thrombocytopenia, fall, ankle pain. She received chemo on July 22nd. Supposed to get platelet transfusion yesterday. Found to have low plt there (drop from >260 to 9, sent to ED. Pt is not bleeding but is on apixaban 5mg bid, CTH negative. Plt never been so low before. No fevers/chills/chest pain/sob/diarrhea/abdominal pain. She did have a unwitnessed fall yesterday am while walking to kitchen, had generalized weakness and fatigue but denies chest pain/LH/palpitations/dyspnea/LOC but says she is unable to remember events clearly. Fall c/b L ankle fracture with pain. Since last chemo july 22nd, she has felt extreme weakness, sleeping a lot, fatigue.   She had dysuria for a day couple days ago, resolved.     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 Patient also reported travel to [place] in car (~10 hrs ride). She had a CT angio done which showed right lower lobe and middle lobe pulmonary arterial emboli. She was noted to have B/l LE DVTs, and placed on Eliquis. In addition pt was found to have large b/l lung masses measuring up to 8.6cm. Pulm was consulted and pt underwent biopsy/VATs on 4/16/21, path showed EBV+ diffuse large B-cell lymphoma. Lymphoma cells are B cells, positive VEENA, CD20, CD79a, Pax5 (dim), Mum-1, Bcl2, CD43,variable CD30, negative CD5, CD10, CD23, cyclin D1, p53 (<5%), cMYC (<20%). Ki-67 proliferation index is high (>90%). CD3+ T cells comprise a minor population in the infiltrate.  stains few plasma cells; B cells are lambda-restricted. Cam 5.2, P40, TTF-1, synaptophysin, chromogranin, PAX-8 and MALU-3 were performed on block 2C. They are negative in lymphoma cells. Pt underwent C1 of R CHOP on 05/19/21 and IT MTX 5/20/21 with LP negative for lymphoma involvement.     Recent PET 06/2021 showed decrease in size of mass. She received Cycle 4 of R-CHOP on 7/22/21. Pending PET after C4.        Allergies:  codeine (Nausea)  penicillin (Hives)          MEDICATIONS  (STANDING):  artificial tears (preservative free) Ophthalmic Solution 1 Drop(s) Both EYES three times a day  citalopram 10 milliGRAM(s) Oral daily  FIRST- Mouthwash  BLM 10 milliLiter(s) Swish and Spit three times a day  magnesium sulfate  IVPB 1 Gram(s) IV Intermittent once  melatonin 3 milliGRAM(s) Oral at bedtime  nystatin    Suspension 5000 Unit(s) Oral two times a day  potassium chloride  10 mEq/100 mL IVPB 10 milliEquivalent(s) IV Intermittent every 1 hour  valACYclovir 1000 milliGRAM(s) Oral two times a day    MEDICATIONS  (PRN):  acetaminophen    Suspension .. 660 milliGRAM(s) Oral every 6 hours PRN Mild Pain (1 - 3), Moderate Pain (4 - 6)  metoclopramide 5 milliGRAM(s) Oral every 8 hours PRN nausea/vomiting      PAST MEDICAL & SURGICAL HISTORY:  ANCA-associated vasculitis  Anxiety and depression  Pulmonary embolism  DVT, lower extremity  GERD (gastroesophageal reflux disease)  Hyperlipidemia  Lung mass  s/p left wedge resection  DLBCL (diffuse large B cell lymphoma)  History of appendectomy        FAMILY HISTORY:  FH: lung cancer (Sibling)    FH: CAD (coronary artery disease) (Sibling)        SOCIAL HISTORY: No EtOH, no tobacco    REVIEW OF SYSTEMS:    CONSTITUTIONAL: No weakness, fevers or chills  EYES/ENT: No visual changes;  No vertigo or throat pain   NECK: No pain or stiffness  RESPIRATORY: No cough, wheezing, hemoptysis; No shortness of breath  CARDIOVASCULAR: No chest pain or palpitations  GASTROINTESTINAL: No abdominal or epigastric pain. No nausea, vomiting, or hematemesis; No diarrhea or constipation. No melena or hematochezia.  GENITOURINARY: No dysuria, frequency or hematuria  NEUROLOGICAL: No numbness or weakness  SKIN: No itching, burning, rashes, or lesions   All other review of systems is negative unless indicated above.    Height (cm): 154.9 (07-30 @ 18:57)  Weight (kg): 65.8 (07-31 @ 03:08)  BMI (kg/m2): 27.4 (07-31 @ 03:08)  BSA (m2): 1.65 (07-31 @ 03:08)    T(F): 98.2 (07-31-21 @ 07:00), Max: 98.5 (07-31-21 @ 00:40)  HR: 88 (07-31-21 @ 07:00) (85 - 99)  BP: 133/76 (07-31-21 @ 07:00)  RR: 18 (07-31-21 @ 07:00)  SpO2: 100% (07-31-21 @ 07:00) (97% - 100%)    Physical Exam  GENERAL: NAD, well-developed  HEAD:  Atraumatic, Normocephalic  EYES: EOMI, PERRLA, conjunctiva and sclera clear  NECK: Supple, No JVD  CHEST/LUNG: Clear to auscultation bilaterally; No wheeze  HEART: Regular rate and rhythm; No murmurs, rubs, or gallops  ABDOMEN: Soft, Nontender, Nondistended; Bowel sounds present  EXTREMITIES:  2+ Peripheral Pulses, No clubbing, cyanosis, or edema. Left Lower Extremity in cast. Right Lower Extremity with edema at the R knee. ROM intact b/l but limited.  NEUROLOGY: non-focal  SKIN: No rashes or lesions                          8.2    0.80  )-----------( 40       ( 31 Jul 2021 07:32 )             24.7       07-31    139  |  108<H>  |  15  ----------------------------<  101<H>  3.3<L>   |  18<L>  |  0.62    Ca    8.7      31 Jul 2021 07:32  Phos  2.7     07-31  Mg     1.90     07-31    TPro  6.3  /  Alb  3.5  /  TBili  0.8  /  DBili  x   /  AST  13  /  ALT  10  /  AlkPhos  52  07-31      Magnesium, Serum: 1.90 mg/dL (07-31 @ 07:32)  Phosphorus Level, Serum: 2.7 mg/dL (07-31 @ 07:32)  Magnesium, Serum: 1.90 mg/dL (07-30 @ 21:05)        Imaging:  < from: CT Head No Cont (07.31.21 @ 00:09) >  IMPRESSION:    Head CT: No acute intracranial hemorrhage or displaced skull fracture. Additional findings as described. If clinically indicated, short-term follow-up or MRI may be obtained for further evaluation.    Cervical spine CT: Osteopenia without obvious acute fracture or traumatic malalignment in the cervical spine. If there is clinical suspicion for acute fracture or ligamentous/cord injury, MRI may be obtained for further evaluation.    < end of copied text >  < from: Xray Chest 1 View- PORTABLE-Urgent (Xray Chest 1 View- PORTABLE-Urgent .) (07.30.21 @ 21:45) >      INTERPRETATION:  clear lungs      < from: Xray Foot AP + Lateral + Oblique, Left (07.30.21 @ 21:44) >  IMPRESSION:    Acute obliquely oriented minimally displaced fracture of the distal fibular metadiaphysis.      < from: NM PET/CT Onc FDG Skull to Thigh, Subsq (06.25.21 @ 18:31) >    IMPRESSION: Compared to FDG-PET/CT scan dated 5/10/2021:    1. Mildly FDG-avid bilateral lung nodules are decreased in size and metabolism, compatible with a partialresponse to interval therapy (Deauville 4). Resolution of mildly FDG-avid bilateral groundglass pulmonary opacities.  2. Few minimally FDG-avid mediastinal and bilateral hilar lymph nodes are similar in size and decreased in metabolism.  3. Resolution of FDG activity associated with right upper pole renal lesion which is better delineated on prior contrast-enhanced CT and interval MRI.  4. Resolution of FDG-avid left adrenal nodule.  5. Resolution of increased FDG activity in anterior aspect of left 9th rib.  6. Resolution of linear focus of increased FDG activity in lateral aspect of left chest wall.  7. Complete opacification of left maxillary sinus with mild, peripheral FDG avidity, unchanged. Correlate clinically for acute sinusitis. Hematology/Oncology Consult Note    HPI:  81 y/o woman hx HTN, ANCA vasculitis (8yrs ago was on Azathioprine, PE + b/l DVT on apixaban, stopped on last admission due to cancer), DLBCL with pulmonary involvement found in 4/2021 on [facility] presenting from outpt onc clinic for thrombocytopenia, fall, ankle pain. She received chemo on July 22nd. Supposed to get platelet transfusion yesterday. Found to have low plt there (drop from >260 to 9, sent to ED. Pt is not bleeding but is on apixaban 5mg bid, CTH negative. Plt never been so low before. No fevers/chills/chest pain/sob/diarrhea/abdominal pain. She did have a unwitnessed fall yesterday am while walking to kitchen, had generalized weakness and fatigue but denies chest pain/LH/palpitations/dyspnea/LOC but says she is unable to remember events clearly. Fall c/b L ankle fracture with pain. Since last chemo july 22nd, she has felt extreme weakness, sleeping a lot, fatigue.   She had dysuria for a day couple days ago, resolved.     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 Patient also reported travel to [place] in car (~10 hrs ride). She had a CT angio done which showed right lower lobe and middle lobe pulmonary arterial emboli. She was noted to have B/l LE DVTs, and placed on Eliquis. In addition pt was found to have large b/l lung masses measuring up to 8.6cm. Pulm was consulted and pt underwent biopsy/VATs on 4/16/21, path showed EBV+ diffuse large B-cell lymphoma. Lymphoma cells are B cells, positive VEENA, CD20, CD79a, Pax5 (dim), Mum-1, Bcl2, CD43,variable CD30, negative CD5, CD10, CD23, cyclin D1, p53 (<5%), cMYC (<20%). Ki-67 proliferation index is high (>90%). CD3+ T cells comprise a minor population in the infiltrate.  stains few plasma cells; B cells are lambda-restricted. Cam 5.2, P40, TTF-1, synaptophysin, chromogranin, PAX-8 and MALU-3 were performed on block 2C. They are negative in lymphoma cells. Pt underwent C1 of R CHOP on 05/19/21 and IT MTX 5/20/21 with LP negative for lymphoma involvement.     Recent PET 06/2021 showed decrease in size of mass. She received Cycle 4 of R-CHOP on 7/22/21. Pending PET after C4.        Allergies:  codeine (Nausea)  penicillin (Hives)      MEDICATIONS  (STANDING):  artificial tears (preservative free) Ophthalmic Solution 1 Drop(s) Both EYES three times a day  citalopram 10 milliGRAM(s) Oral daily  FIRST- Mouthwash  BLM 10 milliLiter(s) Swish and Spit three times a day  magnesium sulfate  IVPB 1 Gram(s) IV Intermittent once  melatonin 3 milliGRAM(s) Oral at bedtime  nystatin    Suspension 5000 Unit(s) Oral two times a day  potassium chloride  10 mEq/100 mL IVPB 10 milliEquivalent(s) IV Intermittent every 1 hour  valACYclovir 1000 milliGRAM(s) Oral two times a day    MEDICATIONS  (PRN):  acetaminophen    Suspension .. 660 milliGRAM(s) Oral every 6 hours PRN Mild Pain (1 - 3), Moderate Pain (4 - 6)  metoclopramide 5 milliGRAM(s) Oral every 8 hours PRN nausea/vomiting      PAST MEDICAL & SURGICAL HISTORY:  ANCA-associated vasculitis  Anxiety and depression  Pulmonary embolism  DVT, lower extremity  GERD (gastroesophageal reflux disease)  Hyperlipidemia  Lung mass  s/p left wedge resection  DLBCL (diffuse large B cell lymphoma)  History of appendectomy        FAMILY HISTORY:  FH: lung cancer (Sibling)    FH: CAD (coronary artery disease) (Sibling)        SOCIAL HISTORY: No EtOH, no tobacco    REVIEW OF SYSTEMS:    CONSTITUTIONAL: No weakness, fevers or chills  EYES/ENT: No visual changes;  No vertigo or throat pain   NECK: No pain or stiffness  RESPIRATORY: No cough, wheezing, hemoptysis; No shortness of breath  CARDIOVASCULAR: No chest pain or palpitations  GASTROINTESTINAL: No abdominal or epigastric pain. No nausea, vomiting, or hematemesis; No diarrhea or constipation. No melena or hematochezia.  GENITOURINARY: No dysuria, frequency or hematuria  NEUROLOGICAL: No numbness or weakness  SKIN: No itching, burning, rashes, or lesions   All other review of systems is negative unless indicated above.    Height (cm): 154.9 (07-30 @ 18:57)  Weight (kg): 65.8 (07-31 @ 03:08)  BMI (kg/m2): 27.4 (07-31 @ 03:08)  BSA (m2): 1.65 (07-31 @ 03:08)    T(F): 98.2 (07-31-21 @ 07:00), Max: 98.5 (07-31-21 @ 00:40)  HR: 88 (07-31-21 @ 07:00) (85 - 99)  BP: 133/76 (07-31-21 @ 07:00)  RR: 18 (07-31-21 @ 07:00)  SpO2: 100% (07-31-21 @ 07:00) (97% - 100%)    Physical Exam  GENERAL: NAD, well-developed  HEAD:  Atraumatic, Normocephalic  EYES: EOMI, PERRLA, conjunctiva and sclera clear  NECK: Supple, No JVD  CHEST/LUNG: Clear to auscultation bilaterally; No wheeze  HEART: Regular rate and rhythm; No murmurs, rubs, or gallops  ABDOMEN: Soft, Nontender, Nondistended; Bowel sounds present  EXTREMITIES:  2+ Peripheral Pulses, No clubbing, cyanosis, or edema. Left Lower Extremity in cast. Right Lower Extremity with edema at the R knee. ROM intact b/l but limited.  NEUROLOGY: non-focal  SKIN: No rashes or lesions                          8.2    0.80  )-----------( 40       ( 31 Jul 2021 07:32 )             24.7       07-31    139  |  108<H>  |  15  ----------------------------<  101<H>  3.3<L>   |  18<L>  |  0.62    Ca    8.7      31 Jul 2021 07:32  Phos  2.7     07-31  Mg     1.90     07-31    TPro  6.3  /  Alb  3.5  /  TBili  0.8  /  DBili  x   /  AST  13  /  ALT  10  /  AlkPhos  52  07-31      Magnesium, Serum: 1.90 mg/dL (07-31 @ 07:32)  Phosphorus Level, Serum: 2.7 mg/dL (07-31 @ 07:32)  Magnesium, Serum: 1.90 mg/dL (07-30 @ 21:05)        Imaging:  < from: CT Head No Cont (07.31.21 @ 00:09) >  IMPRESSION:    Head CT: No acute intracranial hemorrhage or displaced skull fracture. Additional findings as described. If clinically indicated, short-term follow-up or MRI may be obtained for further evaluation.    Cervical spine CT: Osteopenia without obvious acute fracture or traumatic malalignment in the cervical spine. If there is clinical suspicion for acute fracture or ligamentous/cord injury, MRI may be obtained for further evaluation.    < end of copied text >  < from: Xray Chest 1 View- PORTABLE-Urgent (Xray Chest 1 View- PORTABLE-Urgent .) (07.30.21 @ 21:45) >      INTERPRETATION:  clear lungs      < from: Xray Foot AP + Lateral + Oblique, Left (07.30.21 @ 21:44) >  IMPRESSION:    Acute obliquely oriented minimally displaced fracture of the distal fibular metadiaphysis.      < from: NM PET/CT Onc FDG Skull to Thigh, Subsq (06.25.21 @ 18:31) >    IMPRESSION: Compared to FDG-PET/CT scan dated 5/10/2021:    1. Mildly FDG-avid bilateral lung nodules are decreased in size and metabolism, compatible with a partialresponse to interval therapy (Deauville 4). Resolution of mildly FDG-avid bilateral groundglass pulmonary opacities.  2. Few minimally FDG-avid mediastinal and bilateral hilar lymph nodes are similar in size and decreased in metabolism.  3. Resolution of FDG activity associated with right upper pole renal lesion which is better delineated on prior contrast-enhanced CT and interval MRI.  4. Resolution of FDG-avid left adrenal nodule.  5. Resolution of increased FDG activity in anterior aspect of left 9th rib.  6. Resolution of linear focus of increased FDG activity in lateral aspect of left chest wall.  7. Complete opacification of left maxillary sinus with mild, peripheral FDG avidity, unchanged. Correlate clinically for acute sinusitis.

## 2021-07-31 NOTE — H&P ADULT - PROBLEM SELECTOR PLAN 2
- EBV+ DLBCL - diagnosed 4/13/21 after presenting to the ED with SOB/CP found to have RML RLL PE and b/l LE DVT along with lung masses up to 8.6cm s/p VATS/biopsy confirming EBV+ DLBCL. s/p chemoport.   - PET/CT:  Status post wedge resection of left upper lobe lung nodule, as compared to CT dated 4/13/2021. Additional FDG-avid, partially necrotic bilateral lung masses and mildly avid groundglass opacities are not significantly changed as compared to prior CT, compatible with biopsy-proven lymphoma. 2. Few mildly FDG-avid, mildly enlarged mediastinal and bilateral hilar lymph nodes are nonspecific. 3. Previously seen indeterminate3.1 cm lesion in the upper pole of right kidney is FDG-avid.  4. FDG-avid left adrenal nodule is indeterminate.   - contact onc in am - EBV+ DLBCL - diagnosed 4/13/21 after presenting to the ED with SOB/CP found to have RML RLL PE and b/l LE DVT along with lung masses up to 8.6cm s/p VATS/biopsy confirming EBV+ DLBCL. s/p chemoport.   - PET/CT 6/25:  Status post wedge resection of left upper lobe lung nodule, as compared to CT dated 4/13/2021. Additional FDG-avid, partially necrotic bilateral lung masses and mildly avid groundglass opacities are not significantly changed as compared to prior CT, compatible with biopsy-proven lymphoma. 2. Few mildly FDG-avid, mildly enlarged mediastinal and bilateral hilar lymph nodes are nonspecific. 3. Previously seen indeterminate3.1 cm lesion in the upper pole of right kidney is FDG-avid.  4. FDG-avid left adrenal nodule is indeterminate.   - chemo - rchop  - contact onc in am - hx RLL PE and b/l LE DVT, started on apixaban 5mg bid  - currently held apixaban in setting of platelet  - high risk bleed and high risk clotting - f/u heme/onc recs regarding restarting anticoagulation - hx RLL PE and b/l LE DVT, started on apixaban 5mg bid  - currently held apixaban in setting of severe thrombocytopenia  - high risk bleed and high risk clotting - f/u heme/onc recs regarding restarting anticoagulation

## 2021-07-31 NOTE — PROGRESS NOTE ADULT - ATTENDING COMMENTS
Pt examined bedside. Plan discussed with resident. Heme onc stated to hold off n neupogen for now. Pt denies any signs of bleeding. Expresses major fatigue. Will continue to monitor platelets currently 40. Macrocytic anemia noted so start on B12, FA, and iron. continue to hold Eliquis until platelets improve. Pt would like to go to rehab and see PT as she lives alone. Would also need a HHA for the same reason. Phos and K replenished.      Ruth Henriquez MD  Attending Hospitalist.

## 2021-07-31 NOTE — PHYSICAL THERAPY INITIAL EVALUATION ADULT - ADDITIONAL COMMENTS
Patient lives alone in a private house, 5 steps to enter. Patient reports being independent in ADLs and ambulation prior to admission. Pt reports her niece and granddaughters can assist when she returns home.    Patient was left safely in bed, all lines/tubes intact and call bell within reach, RN aware

## 2021-07-31 NOTE — H&P ADULT - HISTORY OF PRESENT ILLNESS
Ms. Higgins is a 79 y/o woman hxHTN, ANCA vasculitis (8yrs ago was on Azathioprine, stopped on last admission due to cancer), PE (on apixabanBCL with pulmonary involvement found in 4/2021. She is on apixaban for PE.   For EBV+ DLBCL - diagnosed 4/13/21 after presenting to the ED with SOB/CP found to have RML RLL PE and b/l LE DVT along with lung masses up to 8.6cm s/p VATS/biopsy confirming EBV+ DLBCL. s/p chemoport.     PET/CT:  Status post wedge resection of left upper lobe lung nodule, as compared to CT dated 4/13/2021. Additional FDG-avid, partially necrotic bilateral lung masses and mildly avid groundglass opacities are not significantly changed as compared to prior CT, compatible with biopsy-proven lymphoma. 2. Few mildly FDG-avid, mildly enlarged mediastinal and bilateral hilar lymph nodes are nonspecific. 3. Previously seen indeterminate3.1 cm lesion in the upper pole of right kidney is FDG-avid.  4. FDG-avid left adrenal nodule is indeterminate.     ANCA vasculitis diagnosed 20 years (has been following with Rheumatology on Azathioprine).               Ms. 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     For EBV+ DLBCL - diagnosed 4/13/21 after presenting to the ED with SOB/CP found to have RML RLL PE and b/l LE DVT along with lung masses up to 8.6cm s/p VATS/biopsy confirming EBV+ DLBCL. s/p chemoport.     PET/CT:  Status post wedge resection of left upper lobe lung nodule, as compared to CT dated 4/13/2021. Additional FDG-avid, partially necrotic bilateral lung masses and mildly avid groundglass opacities are not significantly changed as compared to prior CT, compatible with biopsy-proven lymphoma. 2. Few mildly FDG-avid, mildly enlarged mediastinal and bilateral hilar lymph nodes are nonspecific. 3. Previously seen indeterminate3.1 cm lesion in the upper pole of right kidney is FDG-avid.  4. FDG-avid left adrenal nodule is indeterminate.     ANCA vasculitis diagnosed 20 years (has been following with Rheumatology on Azathioprine).               Ms. 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 Chemo was 22nd. Supposed to get platelet transfusion yesterday. Found to have low plt, sent to ED. CTH negative. Plt never been so low before.   She had dysuria for a day couple days ago, resolved.     For EBV+ DLBCL - diagnosed 4/13/21 after presenting to the ED with SOB/CP found to have RML RLL PE and b/l LE DVT along with lung masses up to 8.6cm s/p VATS/biopsy confirming EBV+ DLBCL. s/p chemoport.     PET/CT:  Status post wedge resection of left upper lobe lung nodule, as compared to CT dated 4/13/2021. Additional FDG-avid, partially necrotic bilateral lung masses and mildly avid groundglass opacities are not significantly changed as compared to prior CT, compatible with biopsy-proven lymphoma. 2. Few mildly FDG-avid, mildly enlarged mediastinal and bilateral hilar lymph nodes are nonspecific. 3. Previously seen indeterminate3.1 cm lesion in the upper pole of right kidney is FDG-avid.  4. FDG-avid left adrenal nodule is indeterminate. No fever/chill. No chest pain. No sob. No abdominal pain or diarrhea. Pain in ankle fracture.     ANCA vasculitis diagnosed 20 years (has been following with Rheumatology on Azathioprine).     medication: apixaban 5mg bid, mouth sores medication, stool softener, omeprazole, cholesterol lowering, citalopram.   rxn to penicillin allergy - welts, does not remember if resp distress   codeine - nausea    surgeries: s/p tubes tied 20 years ago  no smoking/alcohol/drugs            Ms. 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 Chemo was 22nd. Supposed to get platelet transfusion yesterday. Found to have low plt, sent to ED. CTH negative. Plt never been so low before.   She had dysuria for a day couple days ago, resolved.     For EBV+ DLBCL - diagnosed 4/13/21 after presenting to the ED with SOB/CP found to have RML RLL PE and b/l LE DVT along with lung masses up to 8.6cm s/p VATS/biopsy confirming EBV+ DLBCL. s/p chemoport. rchop.     PET/CT:  Status post wedge resection of left upper lobe lung nodule, as compared to CT dated 4/13/2021. Additional FDG-avid, partially necrotic bilateral lung masses and mildly avid groundglass opacities are not significantly changed as compared to prior CT, compatible with biopsy-proven lymphoma. 2. Few mildly FDG-avid, mildly enlarged mediastinal and bilateral hilar lymph nodes are nonspecific. 3. Previously seen indeterminate3.1 cm lesion in the upper pole of right kidney is FDG-avid.  4. FDG-avid left adrenal nodule is indeterminate. No fever/chill. No chest pain. No sob. No abdominal pain or diarrhea. Pain in ankle fracture.     ANCA vasculitis diagnosed 20 years (has been following with Rheumatology on Azathioprine).     medication: apixaban 5mg bid, mouth sores medication, stool softener, omeprazole, cholesterol lowering, citalopram.   rxn to penicillin allergy - welts, does not remember if resp distress   codeine - nausea    surgeries: s/p tubes tied 20 years ago  no smoking/alcohol/drugs            Ms. Higgins is a 79 y/o woman hxHTN, ANCA vasculitis (8yrs ago was on Azathioprine, PE + b/l DVT on apixaban, stopped on last admission due to cancer), DLBCL with pulmonary involvement found in 4/2021 presenting from outpt onc clinic for thrombocytopenia. She received chemo on July 22nd. Supposed to get platelet transfusion yesterday. Found to have low plt there (drop from >260 to 9, sent to ED. Pt is not bleeding but is on apixaban 5mg bid, CTH negative. Plt never been so low before. No fevers/chills/chest pain/sob/diarrhea/abdominal pain. She did have a unwitnessed fall yesterday am while walking to kitchen, had weakness, denies chest pain/LH/palpitations/dyspnea/LOC but says she is unable to remember events clearly. Fall c/b L ankle fracture. Since last chemo july 22nd, she has felt extreme weakness, sleeping a lot, fatigue.   She had dysuria for a day couple days ago, resolved.     For EBV+ DLBCL - diagnosed 4/13/21 after presenting to the ED with SOB/CP found to have RML RLL PE and b/l LE DVT along with lung masses up to 8.6cm s/p VATS/biopsy confirming EBV+ DLBCL. s/p chemoport. On RCHOP, most recent cycle July 22nd.     PET/CT:  Status post wedge resection of left upper lobe lung nodule, as compared to CT dated 4/13/2021. Additional FDG-avid, partially necrotic bilateral lung masses and mildly avid groundglass opacities are not significantly changed as compared to prior CT, compatible with biopsy-proven lymphoma. 2. Few mildly FDG-avid, mildly enlarged mediastinal and bilateral hilar lymph nodes are nonspecific. 3. Previously seen indeterminate3.1 cm lesion in the upper pole of right kidney is FDG-avid.  4. FDG-avid left adrenal nodule is indeterminate. No fever/chill. No chest pain. No sob. No abdominal pain or diarrhea. Pain in ankle fracture.     ANCA vasculitis diagnosed 20 years (has been following with Rheumatology on Azathioprine).     medication: apixaban 5mg bid, mouth sores medication, stool softener, omeprazole, cholesterol lowering, citalopram.   rxn to penicillin allergy - welts, does not remember if resp distress   codeine - nausea    surgeries: s/p tubes tied 20 years ago  no smoking/alcohol/drugs            81 y/o woman hx HTN, ANCA vasculitis (8yrs ago was on Azathioprine, PE + b/l DVT on apixaban, stopped on last admission due to cancer), DLBCL with pulmonary involvement found in 4/2021 on [facility] presenting from outpt onc clinic for thrombocytopenia, fall, ankle pain. She received chemo on July 22nd. Supposed to get platelet transfusion yesterday. Found to have low plt there (drop from >260 to 9, sent to ED. Pt is not bleeding but is on apixaban 5mg bid, CTH negative. Plt never been so low before. No fevers/chills/chest pain/sob/diarrhea/abdominal pain. She did have a unwitnessed fall yesterday am while walking to kitchen, had generalized weakness and fatigue but denies chest pain/LH/palpitations/dyspnea/LOC but says she is unable to remember events clearly. Fall c/b L ankle fracture with pain. Since last chemo july 22nd, she has felt extreme weakness, sleeping a lot, fatigue.   She had dysuria for a day couple days ago, resolved.     For EBV+ DLBCL - diagnosed 4/13/21 after presenting to the ED with SOB/CP found to have RML RLL PE and b/l LE DVT along with lung masses up to 8.6cm s/p VATS/biopsy confirming EBV+ DLBCL. s/p chemoport. On [facility], most recent cycle July 22nd.     PET/CT:  Status post wedge resection of left upper lobe lung nodule, as compared to CT dated 4/13/2021. Additional FDG-avid, partially necrotic bilateral lung masses and mildly avid groundglass opacities are not significantly changed as compared to prior CT, compatible with biopsy-proven lymphoma. 2. Few mildly FDG-avid, mildly enlarged mediastinal and bilateral hilar lymph nodes are nonspecific. 3. Previously seen indeterminate3.1 cm lesion in the upper pole of right kidney is FDG-avid.  4. FDG-avid left adrenal nodule is indeterminate. No fever/chill. No chest pain. No sob. No abdominal pain or diarrhea. Pain in ankle fracture.     ANCA vasculitis diagnosed 20 years (has been following with Rheumatology on Azathioprine).     medication: apixaban 5mg bid, mouth sores medication, stool softener, omeprazole, cholesterol lowering, citalopram.   rxn to penicillin allergy - welts, does not remember if resp distress   codeine - nausea    surgeries: s/p tubes tied 20 years ago  no smoking/alcohol/drugs

## 2021-07-31 NOTE — H&P ADULT - ATTENDING COMMENTS
80F with PMHx HTN, ANCA vasculitis (8yrs ago was on Azathioprine, stopped due to initiation of chemo), PE 4/2021 (on apixaban), DLBCL with pulmonary involvement found in 4/2021 presenting from outpt onc clinic for pancytopenia, fall. Patient had unwitnessed fall yesterday in the setting of generalized weakness - CTH neg, CT C-spine neg but found to have left ankle fracture and seen by ortho s/p splint. NWB LLE and possible eventual operation. Today found to have plt 7 with pancytopenia and sent in. Given 1u Plt and repeat CBC pending. Goal plt >20 and pending heme onc consult. Hgb downtrending slowly since initiation of RCHOP. May require PRBC transfusion for symptomatic anemia but defer to heme onc.  and may require neupogen but will discuss with heme onc. Hold eliquis for now pending improvement in plt count but is high risk for VTE given malignancy and PE 3 months ago and bilateral DVTs. Will need to resume when plt stabilized.

## 2021-07-31 NOTE — CONSULT NOTE ADULT - ASSESSMENT
80 year old woman with history of ANCA vasculitis diagnosed 20 years , PE with b/l DVT 04/21 on apixaban and DLBCL s/p C4 R-CHOP 7/22/21 and IT MTX 5/20, LP negative for lymphoma. Plan was to repeat PET after C4 presenting with weakness and fall found to have thrombocytopenia (7) s/p 1 unit of plts. CTH negative for bleeding. Hematology consulted for further management of thrombocytopenia in the setting of recent chemotherapy.    #DLBCL  -Pt follows with Dr. Madrigal  -s/p C4 R-CHOP 7/22  -PET 06/2021 with decrease in size of lymphoma  -At this time pt does not meet requirement for inpatient treatment    #Thrombocytopenia  -In the setting of chemotherapy  -s/p 1 unit plt transfusion  -CTH negative for ICH  -daily CBC  -Transfuse for plt <10 and Plt <20 with fevers    #DVT/PE  -Can continue to hold eliquis for now given drop in platelet  -Will monitor plt curve, if stable can restart eliquis once plts above 50K    #Fall  -Found to have  L ankle fracture s/p closed reduction and splinting by ortho team  -Appreciate recs  -f/u OP ortho in 1 week  -pain control    Please page with questions or concerns. Will Follow with you.      Stanislav Holland M.D.  Hematology/Oncology Fellow PGY4  Pager [phone]  After 5pm, please contact on-call team.   80 year old woman with history of ANCA vasculitis diagnosed 20 years , PE with b/l DVT 04/21 on apixaban and DLBCL s/p C4 R-CHOP 7/22/21 and IT MTX 5/20, LP negative for lymphoma. Plan was to repeat PET after C4 presenting with weakness and fall found to have thrombocytopenia (7) s/p 1 unit of plts. CTH negative for bleeding. Hematology consulted for further management of thrombocytopenia in the setting of recent chemotherapy.    #DLBCL  -Pt follows with Dr. Madrigal  -s/p C4 R-CHOP 7/22  -PET 06/2021 with decrease in size of lymphoma  -At this time pt does not meet requirement for inpatient treatment    #Thrombocytopenia  -In the setting of chemotherapy  -s/p 1 unit plt transfusion  -CTH negative for ICH  -daily CBC  -Transfuse for plt <10 and Plt <20 with fevers    #Neutropenia  -Hold off on any bone marrow stimulant for now  -Check daily ANC    #DVT/PE  -Can continue to hold eliquis for now given drop in platelet  -Will monitor plt curve, if stable can restart eliquis once plts above 50K    #Fall  -Found to have L ankle fracture s/p closed reduction and splinting by ortho team  -Pt also with Right Lower Extremity pain, but refusing xray at this time. Recommend xrays if pain or swelling worsen  -f/u OP ortho in 1 week from discharge  -pain control  -PT recommending YOHANA    Please page with questions or concerns. Will Follow with you.      Stanislav Holland M.D.  Hematology/Oncology Fellow PGY4  Pager [phone]  After 5pm, please contact on-call team.   80 year old woman with history of ANCA vasculitis diagnosed 20 years , PE with b/l DVT 04/21 on apixaban and DLBCL s/p C4 R-CHOP 7/22/21 and IT MTX 5/20, LP negative for lymphoma. Plan was to repeat PET after C4 presenting with weakness and fall found to have thrombocytopenia (7) s/p 1 unit of plts. CTH negative for bleeding. c/b L ankle fx s/p splinting by ortho. Hematology consulted for further management of thrombocytopenia in the setting of recent chemotherapy.    #DLBCL  -Pt follows with Dr. Madrigal  -s/p C4 R-CHOP 7/22  -PET 06/2021 with decrease in size of lymphoma  -At this time pt does not meet requirement for inpatient treatment    #Thrombocytopenia  -In the setting of chemotherapy  -s/p 1 unit plt transfusion  -CTH negative for ICH  -daily CBC  -Transfuse for plt <10 and Plt <20 with fevers    #Neutropenia  -Hold off on any bone marrow stimulant for now  -Check daily ANC    #DVT/PE  -Can continue to hold eliquis for now given drop in platelet  -Will monitor plt curve, if stable can restart eliquis once plts above 50K    #Fall  -Found to have L ankle fracture s/p closed reduction and splinting by ortho team  -Pt also with Right Lower Extremity pain, but refusing xray at this time. Recommend xrays if pain or swelling worsen  -f/u OP ortho in 1 week from discharge  -pain control  -PT recommending YOHANA    Please page with questions or concerns. Will Follow with you.      Stanislav Holland M.D.  Hematology/Oncology Fellow PGY4  Pager [phone]  After 5pm, please contact on-call team.

## 2021-07-31 NOTE — H&P ADULT - PROBLEM SELECTOR PLAN 5
- hold apixaban - likely 2/2 chemo - magic mouth wash  - oral thrush - evaluate for esophageal thrush - nystatin swish       #R eye dryness  - eye drops ordered

## 2021-07-31 NOTE — PROVIDER CONTACT NOTE (CRITICAL VALUE NOTIFICATION) - ASSESSMENT
Patient A+Ox4, denies chest pain or SOB. No acute s/s of distress noted. Resting comfortably in bed.

## 2021-07-31 NOTE — H&P ADULT - NSHPPHYSICALEXAM_GEN_ALL_CORE
PHYSICAL EXAM:  GENERAL: NAD, lying in bed comfortably  HEAD:  Atraumatic, Normocephalic  EYES: EOMI, PERRLA, conjunctiva and sclera clear  ENT: Moist mucous membranes  NECK: Supple, No JVD  CHEST/LUNG: Clear to auscultation bilaterally; No rales, rhonchi, wheezing, or rubs. Unlabored respirations  HEART: Regular rate and rhythm; No murmurs, rubs, or gallops  ABDOMEN: Bowel sounds present; Soft, Nontender, Nondistended. No hepatomegally  EXTREMITIES:  2+ Peripheral Pulses, brisk capillary refill. No clubbing, cyanosis, or edema  NERVOUS SYSTEM:  Alert & Oriented X3, speech clear. No deficits   MSK: FROM all 4 extremities, full and equal strength  SKIN: No rashes or lesions PHYSICAL EXAM:  GENERAL: appears comfortable on RA  HEAD:  Atraumatic, Normocephalic  EYES: EOMI, PERRLA, conjunctiva and sclera clear  CHEST/LUNG: Clear to auscultation bilaterally; No rales, rhonchi, wheezing, or rubs. Unlabored respirations  HEART: Regular rate and rhythm; No murmurs, rubs, or gallops  ABDOMEN: Bowel sounds present; Soft, Nontender, Nondistended. No hepatomegally  EXTREMITIES: L ankle in splint, R LE knee medial tender to palpation. moves all extremities  NERVOUS SYSTEM:  Alert & Oriented X3, speech clear. No deficits. 5/5 b/l UE.   MSK: FROM all 4 extremities, full and equal strength PHYSICAL EXAM:  GENERAL: appears comfortable on RA, well developed  HEAD:  Atraumatic, Normocephalic  EYES: EOMI, PERRLA, conjunctiva and sclera clear  ENT: tongue ulcer, possible thrush  CHEST/LUNG: Clear to auscultation bilaterally; No rales, rhonchi, wheezing, or rubs. Unlabored respirations  HEART: Regular rate and rhythm; No murmurs, rubs, or gallops, no edema  ABDOMEN: Bowel sounds present; Soft, Nontender, Nondistended. No hepatomegaly  EXTREMITIES: L ankle in splint, R LE knee medial tender to palpation. moves all extremities  NERVOUS SYSTEM:  Alert & Oriented X3, speech clear. No deficits. 5/5 b/l UE.   MSK: FROM all 4 extremities, full and equal strength

## 2021-07-31 NOTE — PROGRESS NOTE ADULT - PROBLEM SELECTOR PROBLEM 2
Normal vision: sees adequately in most situations; can see medication labels, newsprint DLBCL (diffuse large B cell lymphoma)

## 2021-07-31 NOTE — H&P ADULT - PROBLEM SELECTOR PLAN 3
seen by ortho  - Pain control - prefers tylenol   - NWB LLE in trilaminar splint   - Keep splint clean, dry and intact until follow up  - Cane/crutches/walker as needed  - Ice/elevation  - Patient counseled on possible need for operative intervention  - Follow up with Dr. Greene in 1 week, call office for appointment - EBV+ DLBCL - diagnosed 4/13/21 after presenting to the ED with SOB/CP found to have RML RLL PE and b/l LE DVT along with lung masses up to 8.6cm s/p VATS/biopsy confirming EBV+ DLBCL. s/p chemoport.   - PET/CT 6/25:  Status post wedge resection of left upper lobe lung nodule, as compared to CT dated 4/13/2021. Additional FDG-avid, partially necrotic bilateral lung masses and mildly avid groundglass opacities are not significantly changed as compared to prior CT, compatible with biopsy-proven lymphoma. 2. Few mildly FDG-avid, mildly enlarged mediastinal and bilateral hilar lymph nodes are nonspecific. 3. Previously seen indeterminate3.1 cm lesion in the upper pole of right kidney is FDG-avid.  4. FDG-avid left adrenal nodule is indeterminate.   - chemo - on R-CHOP, last cycle july 22  - contact onc in am

## 2021-07-31 NOTE — CONSULT NOTE ADULT - SUBJECTIVE AND OBJECTIVE BOX
Orthopedic Surgery Consult Note    80yFemale p/w L ankle pain and inability to bear weight s/p mechanical fall. Patient has non hodgkins lymphoma and is on chemotherapy. She experienced weakness and fell today, she believes the weakness is from her chemo. Denies headstrike/LOC. Denies numbness/tingling in the feet/toes. Patient states that her R knee had some mild pain but is resolving                          8.4    0.50  )-----------( 7        ( 30 Jul 2021 21:05 )             25.6     30 Jul 2021 21:05    138    |  106    |  20     ----------------------------<  167    3.6     |  17     |  0.70     Ca    8.4        30 Jul 2021 21:05  Mg     1.90      30 Jul 2021 21:05    TPro  5.6    /  Alb  3.3    /  TBili  0.4    /  DBili  x      /  AST  17     /  ALT  7      /  AlkPhos  47     30 Jul 2021 21:05    PT/INR - ( 30 Jul 2021 21:05 )   PT: 18.1 sec;   INR: 1.62 ratio         PTT - ( 30 Jul 2021 21:05 )  PTT:29.9 sec  Vital Signs Last 24 Hrs  T(C): 36.8 (07-31-21 @ 01:00), Max: 36.9 (07-31-21 @ 00:40)  T(F): 98.3 (07-31-21 @ 01:00), Max: 98.5 (07-31-21 @ 00:40)  HR: 93 (07-31-21 @ 01:00) (85 - 99)  BP: 128/54 (07-31-21 @ 01:00) (101/87 - 146/53)  BP(mean): --  RR: 18 (07-31-21 @ 01:00) (16 - 20)  SpO2: 100% (07-31-21 @ 01:00) (97% - 100%)    Physical Exam  Gen: Nad  LLE: Skin intact, +skin tenting medially +TTP medial/lateral malleolus  motor intact distally  SILT s/s/sp/dp/t  2+ DP  RLE: Mild R knee effusion noted, patietn on blood thinners. Active and passive ROm intact with minimal pain. Mild TTP about anterior knee. No bruising or erythema noted    Imaging:  XR showing L ankle fracture. Stress review did not reveal any clear space widening     Procedure: Closed reduction performed followed by placement of a well padded trilam splint. Patient tolerated the procedure well. Post procedure imaging obtained and showed improved alignment. Post procedure the patient was NV intact.    A/P: 80yFemale with L ankle fracture s/p closed reduction and splinting, medial clear space did not open on stress view. Patient had also complained of mild and improving R knee pain and was noted to have a small effusion on exam, patient likely has some mild hemarthrosis. Recommended patient get R knee xrays however patient currently refusing as pain is improving and she was able to ambulate on the RLE.   - Pain control  - NWB LLE in trilaminar splint   - Keep splint clean, dry and intact until follow up  - Cane/crutches/walker as needed  - Ice/elevation  - Patient counseled on possible need for operative intervention  - Follow up with Dr. Greene in 1 week, call office for appointment

## 2021-07-31 NOTE — PROGRESS NOTE ADULT - ASSESSMENT
Ms. 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    Ms. 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

## 2021-08-01 LAB
ANION GAP SERPL CALC-SCNC: 13 MMOL/L — SIGNIFICANT CHANGE UP (ref 7–14)
ANISOCYTOSIS BLD QL: SLIGHT — SIGNIFICANT CHANGE UP
BASOPHILS # BLD AUTO: 0.08 K/UL — SIGNIFICANT CHANGE UP (ref 0–0.2)
BASOPHILS NFR BLD AUTO: 4.4 % — HIGH (ref 0–2)
BUN SERPL-MCNC: 12 MG/DL — SIGNIFICANT CHANGE UP (ref 7–23)
CALCIUM SERPL-MCNC: 8.6 MG/DL — SIGNIFICANT CHANGE UP (ref 8.4–10.5)
CHLORIDE SERPL-SCNC: 110 MMOL/L — HIGH (ref 98–107)
CO2 SERPL-SCNC: 18 MMOL/L — LOW (ref 22–31)
COVID-19 SPIKE DOMAIN AB INTERP: POSITIVE
COVID-19 SPIKE DOMAIN ANTIBODY RESULT: 59.7 U/ML — HIGH
CREAT SERPL-MCNC: 0.56 MG/DL — SIGNIFICANT CHANGE UP (ref 0.5–1.3)
DACRYOCYTES BLD QL SMEAR: SLIGHT — SIGNIFICANT CHANGE UP
EOSINOPHIL # BLD AUTO: 0.03 K/UL — SIGNIFICANT CHANGE UP (ref 0–0.5)
EOSINOPHIL NFR BLD AUTO: 1.8 % — SIGNIFICANT CHANGE UP (ref 0–6)
GIANT PLATELETS BLD QL SMEAR: PRESENT — SIGNIFICANT CHANGE UP
GLUCOSE SERPL-MCNC: 96 MG/DL — SIGNIFICANT CHANGE UP (ref 70–99)
HCT VFR BLD CALC: 25 % — LOW (ref 34.5–45)
HGB BLD-MCNC: 8.3 G/DL — LOW (ref 11.5–15.5)
HYPOCHROMIA BLD QL: SLIGHT — SIGNIFICANT CHANGE UP
IANC: 1 K/UL — LOW (ref 1.5–8.5)
LYMPHOCYTES # BLD AUTO: 0.44 K/UL — LOW (ref 1–3.3)
LYMPHOCYTES # BLD AUTO: 25.7 % — SIGNIFICANT CHANGE UP (ref 13–44)
MACROCYTES BLD QL: SLIGHT — SIGNIFICANT CHANGE UP
MAGNESIUM SERPL-MCNC: 2.2 MG/DL — SIGNIFICANT CHANGE UP (ref 1.6–2.6)
MANUAL SMEAR VERIFICATION: SIGNIFICANT CHANGE UP
MCHC RBC-ENTMCNC: 33.2 GM/DL — SIGNIFICANT CHANGE UP (ref 32–36)
MCHC RBC-ENTMCNC: 36.6 PG — HIGH (ref 27–34)
MCV RBC AUTO: 110.1 FL — HIGH (ref 80–100)
METAMYELOCYTES # FLD: 1.8 % — HIGH (ref 0–1)
MONOCYTES # BLD AUTO: 0.18 K/UL — SIGNIFICANT CHANGE UP (ref 0–0.9)
MONOCYTES NFR BLD AUTO: 10.6 % — SIGNIFICANT CHANGE UP (ref 2–14)
NEUTROPHILS # BLD AUTO: 0.9 K/UL — LOW (ref 1.8–7.4)
NEUTROPHILS NFR BLD AUTO: 46.9 % — SIGNIFICANT CHANGE UP (ref 43–77)
NEUTS BAND # BLD: 5.3 % — SIGNIFICANT CHANGE UP (ref 0–6)
OVALOCYTES BLD QL SMEAR: SLIGHT — SIGNIFICANT CHANGE UP
PHOSPHATE SERPL-MCNC: 3.1 MG/DL — SIGNIFICANT CHANGE UP (ref 2.5–4.5)
PLAT MORPH BLD: NORMAL — SIGNIFICANT CHANGE UP
PLATELET # BLD AUTO: 33 K/UL — LOW (ref 150–400)
PLATELET COUNT - ESTIMATE: ABNORMAL
POIKILOCYTOSIS BLD QL AUTO: SLIGHT — SIGNIFICANT CHANGE UP
POLYCHROMASIA BLD QL SMEAR: SLIGHT — SIGNIFICANT CHANGE UP
POTASSIUM SERPL-MCNC: 3.9 MMOL/L — SIGNIFICANT CHANGE UP (ref 3.5–5.3)
POTASSIUM SERPL-SCNC: 3.9 MMOL/L — SIGNIFICANT CHANGE UP (ref 3.5–5.3)
RBC # BLD: 2.27 M/UL — LOW (ref 3.8–5.2)
RBC # FLD: 15.2 % — HIGH (ref 10.3–14.5)
RBC BLD AUTO: ABNORMAL
SARS-COV-2 IGG+IGM SERPL QL IA: 59.7 U/ML — HIGH
SARS-COV-2 IGG+IGM SERPL QL IA: POSITIVE
SODIUM SERPL-SCNC: 141 MMOL/L — SIGNIFICANT CHANGE UP (ref 135–145)
VARIANT LYMPHS # BLD: 3.5 % — SIGNIFICANT CHANGE UP (ref 0–6)
WBC # BLD: 1.73 K/UL — LOW (ref 3.8–10.5)
WBC # FLD AUTO: 1.73 K/UL — LOW (ref 3.8–10.5)

## 2021-08-01 PROCEDURE — 99232 SBSQ HOSP IP/OBS MODERATE 35: CPT

## 2021-08-01 RX ADMIN — CITALOPRAM 10 MILLIGRAM(S): 10 TABLET, FILM COATED ORAL at 12:44

## 2021-08-01 RX ADMIN — Medication 500000 UNIT(S): at 21:18

## 2021-08-01 RX ADMIN — Medication 325 MILLIGRAM(S): at 12:44

## 2021-08-01 RX ADMIN — Medication 3 MILLIGRAM(S): at 21:19

## 2021-08-01 RX ADMIN — Medication 500000 UNIT(S): at 06:18

## 2021-08-01 RX ADMIN — DIPHENHYDRAMINE HYDROCHLORIDE AND LIDOCAINE HYDROCHLORIDE AND ALUMINUM HYDROXIDE AND MAGNESIUM HYDRO 10 MILLILITER(S): KIT at 21:17

## 2021-08-01 RX ADMIN — Medication 1 MILLIGRAM(S): at 12:44

## 2021-08-01 RX ADMIN — VALACYCLOVIR 1000 MILLIGRAM(S): 500 TABLET, FILM COATED ORAL at 17:44

## 2021-08-01 RX ADMIN — Medication 1 DROP(S): at 21:19

## 2021-08-01 RX ADMIN — DIPHENHYDRAMINE HYDROCHLORIDE AND LIDOCAINE HYDROCHLORIDE AND ALUMINUM HYDROXIDE AND MAGNESIUM HYDRO 10 MILLILITER(S): KIT at 12:44

## 2021-08-01 RX ADMIN — Medication 500000 UNIT(S): at 12:45

## 2021-08-01 RX ADMIN — PREGABALIN 1000 MICROGRAM(S): 225 CAPSULE ORAL at 12:44

## 2021-08-01 RX ADMIN — Medication 1 DROP(S): at 12:44

## 2021-08-01 RX ADMIN — Medication 500000 UNIT(S): at 17:44

## 2021-08-01 RX ADMIN — Medication 1 DROP(S): at 06:19

## 2021-08-01 RX ADMIN — VALACYCLOVIR 1000 MILLIGRAM(S): 500 TABLET, FILM COATED ORAL at 06:18

## 2021-08-01 RX ADMIN — DIPHENHYDRAMINE HYDROCHLORIDE AND LIDOCAINE HYDROCHLORIDE AND ALUMINUM HYDROXIDE AND MAGNESIUM HYDRO 10 MILLILITER(S): KIT at 06:18

## 2021-08-01 NOTE — PROGRESS NOTE ADULT - ASSESSMENT
Ms. 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

## 2021-08-01 NOTE — PROGRESS NOTE ADULT - PROBLEM SELECTOR PLAN 2
- EBV+ DLBCL - diagnosed 4/13/21 after presenting to the ED with SOB/CP found to have RML RLL PE and b/l LE DVT along with lung masses up to 8.6cm s/p VATS/biopsy confirming EBV+ DLBCL. s/p chemoport.   - PET/CT:  Status post wedge resection of left upper lobe lung nodule, as compared to CT dated 4/13/2021. Additional FDG-avid, partially necrotic bilateral lung masses and mildly avid groundglass opacities are not significantly changed as compared to prior CT, compatible with biopsy-proven lymphoma. 2. Few mildly FDG-avid, mildly enlarged mediastinal and bilateral hilar lymph nodes are nonspecific. 3. Previously seen indeterminate3.1 cm lesion in the upper pole of right kidney is FDG-avid.  4. FDG-avid left adrenal nodule is indeterminate.   - no inpatient tx per heme onc

## 2021-08-01 NOTE — CHART NOTE - NSCHARTNOTEFT_GEN_A_CORE
Paged for low diastolic /43. Saw patient, retook BP got 130/50 BP manually. Patient asymptomatic. Denies HA, SOB, dizziness, CP.     Marquis Aguirre PGY1

## 2021-08-01 NOTE — PROGRESS NOTE ADULT - ATTENDING COMMENTS
Examined pt bedside. Discussed plan with resident. Pt's plts are 33 and above transfusion threshold with no signs of bleeding or fever. Will continue to monitor with cbc differential daily. PT recs appreciated. Dispo planning with SW regarding STR as pt unable to bear weight on ankle and lives alone. HHA placement if possible.      Ruth Henriquez MD  Attending Hospitalist Examined pt bedside. Discussed plan with resident. Pt's plts are 33 and above transfusion threshold with no signs of bleeding or fever. Will continue to monitor with cbc differential daily. PT recs appreciated. Dispo planning with SW regarding STR as pt unable to bear weight on ankle and lives alone. HHA placement if possible. Brother bedside also updated.      Ruth Henriquez MD  Attending Hospitalist

## 2021-08-01 NOTE — PROGRESS NOTE ADULT - SUBJECTIVE AND OBJECTIVE BOX
PROGRESS NOTE:   Authored by Dr. Phyllis Jorgensen MD (PGY-1). Pager [facility] [phone]/ [facility]     Patient is a 80y old  Female who presents with a chief complaint of fall, thrombocytopenia, ankle fracture (2021 09:37)      SUBJECTIVE / OVERNIGHT EVENTS:  No acute events overnight.     ADDITIONAL REVIEW OF SYSTEMS:  Patient denies fevers, chills, chest pain, shortness of breath, nausea, abdominal pain, diarrhea, constipation, dysuria, leg swelling, headache, light headedness.    MEDICATIONS  (STANDING):  artificial tears (preservative free) Ophthalmic Solution 1 Drop(s) Both EYES three times a day  citalopram 10 milliGRAM(s) Oral daily  cyanocobalamin 1000 MICROGram(s) Oral daily  ferrous    sulfate 325 milliGRAM(s) Oral daily  FIRST- Mouthwash  BLM 10 milliLiter(s) Swish and Spit three times a day  folic acid 1 milliGRAM(s) Oral daily  melatonin 3 milliGRAM(s) Oral at bedtime  nystatin    Suspension [id number] Unit(s) Swish and Swallow four times a day  valACYclovir 1000 milliGRAM(s) Oral two times a day    MEDICATIONS  (PRN):  acetaminophen    Suspension .. 660 milliGRAM(s) Oral every 6 hours PRN Mild Pain (1 - 3), Moderate Pain (4 - 6)  metoclopramide 5 milliGRAM(s) Oral every 8 hours PRN nausea/vomiting      CAPILLARY BLOOD GLUCOSE        I&O's Summary      PHYSICAL EXAM:  Vital Signs Last 24 Hrs  T(C): 36.6 (01 Aug 2021 06:15), Max: 36.9 (2021 21:41)  T(F): 97.8 (01 Aug 2021 06:15), Max: 98.5 (2021 21:41)  HR: 83 (01 Aug 2021 06:15) (81 - 94)  BP: 120/46 (01 Aug 2021 06:15) (109/62 - 143/43)  BP(mean): --  RR: 19 (01 Aug 2021 06:15) (18 - 19)  SpO2: 100% (01 Aug 2021 06:15) (100% - 100%)    CONSTITUTIONAL: NAD, well-developed  RESPIRATORY: Normal respiratory effort; lungs are clear to auscultation bilaterally  CARDIOVASCULAR: Regular rate and rhythm, normal S1 and S2, no murmur/rub/gallop; No lower extremity edema; Peripheral pulses are 2+ bilaterally  ABDOMEN: Nontender to palpation, normoactive bowel sounds, no rebound/guarding; No hepatosplenomegaly  MUSCLOSKELETAL: no clubbing or cyanosis of digits; no joint swelling or tenderness to palpation  PSYCH: A+O to person, place, and time; affect appropriate    LABS:                        8.2    0.80  )-----------( 40       ( 2021 07:32 )             24.7     07-    139  |  108<H>  |  15  ----------------------------<  101<H>  3.3<L>   |  18<L>  |  0.62    Ca    8.7      2021 07:32  Phos  2.7       Mg     1.90         TPro  6.3  /  Alb  3.5  /  TBili  0.8  /  DBili  x   /  AST  13  /  ALT  10  /  AlkPhos  52  07-31    PT/INR - ( 2021 21:05 )   PT: 18.1 sec;   INR: 1.62 ratio         PTT - ( 2021 21:05 )  PTT:29.9 sec      Urinalysis Basic - ( 2021 00:54 )    Color: Yellow / Appearance: Clear / S.024 / pH: x  Gluc: x / Ketone: Negative  / Bili: Negative / Urobili: <2 mg/dL   Blood: x / Protein: Trace / Nitrite: Negative   Leuk Esterase: Negative / RBC: 2 /HPF / WBC 13 /HPF   Sq Epi: x / Non Sq Epi: 7 /HPF / Bacteria: Occasional        Culture - Urine (collected 2021 00:25)  Source: Clean Catch Clean Catch (Midstream)  Final Report (2021 22:18):    <10,000 CFU/mL Normal Urogenital [place]        Tele Reviewed:    RADIOLOGY & ADDITIONAL TESTS:  Results Reviewed:   Imaging Personally Reviewed:  Electrocardiogram Personally Reviewed:

## 2021-08-01 NOTE — PROGRESS NOTE ADULT - PROBLEM SELECTOR PLAN 1
i/s/o recent chemo on 7/22. Platelet 268 on 7/22/21.    - s/p 1U plt transfusion in ED  - post-transfusion cbc showed plt of 40, up from 7   - CTH without bleeding

## 2021-08-02 DIAGNOSIS — E86.0 DEHYDRATION: ICD-10-CM

## 2021-08-02 LAB
ANION GAP SERPL CALC-SCNC: 12 MMOL/L — SIGNIFICANT CHANGE UP (ref 7–14)
BASOPHILS # BLD AUTO: 0.08 K/UL — SIGNIFICANT CHANGE UP (ref 0–0.2)
BASOPHILS NFR BLD AUTO: 2.2 % — HIGH (ref 0–2)
BUN SERPL-MCNC: 14 MG/DL — SIGNIFICANT CHANGE UP (ref 7–23)
CALCIUM SERPL-MCNC: 8.5 MG/DL — SIGNIFICANT CHANGE UP (ref 8.4–10.5)
CHLORIDE SERPL-SCNC: 110 MMOL/L — HIGH (ref 98–107)
CO2 SERPL-SCNC: 17 MMOL/L — LOW (ref 22–31)
CREAT SERPL-MCNC: 0.65 MG/DL — SIGNIFICANT CHANGE UP (ref 0.5–1.3)
EOSINOPHIL # BLD AUTO: 0.03 K/UL — SIGNIFICANT CHANGE UP (ref 0–0.5)
EOSINOPHIL NFR BLD AUTO: 0.8 % — SIGNIFICANT CHANGE UP (ref 0–6)
GLUCOSE SERPL-MCNC: 93 MG/DL — SIGNIFICANT CHANGE UP (ref 70–99)
HCT VFR BLD CALC: 22.5 % — LOW (ref 34.5–45)
HGB BLD-MCNC: 7.5 G/DL — LOW (ref 11.5–15.5)
IANC: 2.51 K/UL — SIGNIFICANT CHANGE UP (ref 1.5–8.5)
IMM GRANULOCYTES NFR BLD AUTO: 1.7 % — HIGH (ref 0–1.5)
LYMPHOCYTES # BLD AUTO: 0.52 K/UL — LOW (ref 1–3.3)
LYMPHOCYTES # BLD AUTO: 14.5 % — SIGNIFICANT CHANGE UP (ref 13–44)
MAGNESIUM SERPL-MCNC: 1.9 MG/DL — SIGNIFICANT CHANGE UP (ref 1.6–2.6)
MCHC RBC-ENTMCNC: 33.3 GM/DL — SIGNIFICANT CHANGE UP (ref 32–36)
MCHC RBC-ENTMCNC: 36.4 PG — HIGH (ref 27–34)
MCV RBC AUTO: 109.2 FL — HIGH (ref 80–100)
MONOCYTES # BLD AUTO: 0.39 K/UL — SIGNIFICANT CHANGE UP (ref 0–0.9)
MONOCYTES NFR BLD AUTO: 10.9 % — SIGNIFICANT CHANGE UP (ref 2–14)
NEUTROPHILS # BLD AUTO: 2.51 K/UL — SIGNIFICANT CHANGE UP (ref 1.8–7.4)
NEUTROPHILS NFR BLD AUTO: 69.9 % — SIGNIFICANT CHANGE UP (ref 43–77)
NRBC # BLD: 0 /100 WBCS — SIGNIFICANT CHANGE UP
NRBC # FLD: 0 K/UL — SIGNIFICANT CHANGE UP
PHOSPHATE SERPL-MCNC: 2.8 MG/DL — SIGNIFICANT CHANGE UP (ref 2.5–4.5)
PLATELET # BLD AUTO: 43 K/UL — LOW (ref 150–400)
POTASSIUM SERPL-MCNC: 3.6 MMOL/L — SIGNIFICANT CHANGE UP (ref 3.5–5.3)
POTASSIUM SERPL-SCNC: 3.6 MMOL/L — SIGNIFICANT CHANGE UP (ref 3.5–5.3)
RBC # BLD: 2.06 M/UL — LOW (ref 3.8–5.2)
RBC # FLD: 14.7 % — HIGH (ref 10.3–14.5)
SODIUM SERPL-SCNC: 139 MMOL/L — SIGNIFICANT CHANGE UP (ref 135–145)
WBC # BLD: 3.59 K/UL — LOW (ref 3.8–10.5)
WBC # FLD AUTO: 3.59 K/UL — LOW (ref 3.8–10.5)

## 2021-08-02 PROCEDURE — 99233 SBSQ HOSP IP/OBS HIGH 50: CPT | Mod: GC

## 2021-08-02 PROCEDURE — 99232 SBSQ HOSP IP/OBS MODERATE 35: CPT | Mod: GC

## 2021-08-02 RX ADMIN — DIPHENHYDRAMINE HYDROCHLORIDE AND LIDOCAINE HYDROCHLORIDE AND ALUMINUM HYDROXIDE AND MAGNESIUM HYDRO 10 MILLILITER(S): KIT at 21:56

## 2021-08-02 RX ADMIN — VALACYCLOVIR 1000 MILLIGRAM(S): 500 TABLET, FILM COATED ORAL at 05:47

## 2021-08-02 RX ADMIN — Medication 1 DROP(S): at 05:46

## 2021-08-02 RX ADMIN — Medication 500000 UNIT(S): at 12:24

## 2021-08-02 RX ADMIN — Medication 1 DROP(S): at 21:56

## 2021-08-02 RX ADMIN — CITALOPRAM 10 MILLIGRAM(S): 10 TABLET, FILM COATED ORAL at 12:25

## 2021-08-02 RX ADMIN — DIPHENHYDRAMINE HYDROCHLORIDE AND LIDOCAINE HYDROCHLORIDE AND ALUMINUM HYDROXIDE AND MAGNESIUM HYDRO 10 MILLILITER(S): KIT at 05:46

## 2021-08-02 RX ADMIN — Medication 1 DROP(S): at 13:32

## 2021-08-02 RX ADMIN — PREGABALIN 1000 MICROGRAM(S): 225 CAPSULE ORAL at 12:25

## 2021-08-02 RX ADMIN — Medication 500000 UNIT(S): at 17:33

## 2021-08-02 RX ADMIN — Medication 500000 UNIT(S): at 05:46

## 2021-08-02 RX ADMIN — Medication 3 MILLIGRAM(S): at 21:56

## 2021-08-02 RX ADMIN — Medication 325 MILLIGRAM(S): at 12:25

## 2021-08-02 RX ADMIN — VALACYCLOVIR 1000 MILLIGRAM(S): 500 TABLET, FILM COATED ORAL at 17:34

## 2021-08-02 RX ADMIN — DIPHENHYDRAMINE HYDROCHLORIDE AND LIDOCAINE HYDROCHLORIDE AND ALUMINUM HYDROXIDE AND MAGNESIUM HYDRO 10 MILLILITER(S): KIT at 13:32

## 2021-08-02 RX ADMIN — Medication 1 MILLIGRAM(S): at 12:25

## 2021-08-02 NOTE — PROGRESS NOTE ADULT - ATTENDING COMMENTS
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 Ortho – NWB LLE, evaluate for other bony mets.  Monitor Plt level, H/H - transfuse as needed.

## 2021-08-02 NOTE — PROGRESS NOTE ADULT - PROBLEM SELECTOR PLAN 6
- hold apixaban  - SCDs - hold apixaban until thrombocytopenia resolved  - SCDs - hold apixaban until thrombocytopenia resolved  - SCDs    Discharge planning: Pt rec YOHANA. Discuss plans for chemo w/ heme onc. Pt concerned she will not be able to get chemo in rehab.

## 2021-08-02 NOTE — PROGRESS NOTE ADULT - PROBLEM SELECTOR PLAN 5
Hgb 8.2 .9    -Start folate, B12, and iron Hgb 8.2 .9. Ortho team noted small effusion on exam c/f mild hemarthrosis    - c/w folate, B12, and iron  - ordered right knee XR

## 2021-08-02 NOTE — PROGRESS NOTE ADULT - PROBLEM SELECTOR PLAN 1
i/s/o recent chemo on 7/22. Platelet 268 on 7/22/21.    - s/p 1U plt transfusion in ED  - post-transfusion cbc showed plt of 40, up from 7   - CTH without bleeding - s/p 1U plt transfusion in ED  - post-transfusion cbc showed plt of 40, up from 7   - CTH without bleeding

## 2021-08-02 NOTE — PROGRESS NOTE ADULT - ATTENDING COMMENTS
80 yo female with history of ankylosing spondylitis previously on azathioprine, EBV+ DLBCL s/p 4 cycles R-CHOP, admitted for neutropenia, thrombocytopenia and failure to thrive. Found to have L ankle fracture post fall. CBC improving   -continue to trend CBC   -check XR R knee, consider ortho eval for possible joint aspiration   -will discuss prednisone with ortho with future treatment cycles   -restart eliquis once plt >50K   -will need further response assessment, can be done outpatient  -will dose reduce next 2 cycles of chemotherapy

## 2021-08-02 NOTE — PROGRESS NOTE ADULT - ASSESSMENT
Ms. 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 Ms. 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 XR left ankle showed left fibular fracture. S/p 1 platelet transfusion.

## 2021-08-02 NOTE — PROGRESS NOTE ADULT - SUBJECTIVE AND OBJECTIVE BOX
PROGRESS NOTE:   Authored by Dr. Phyllis Jorgensen MD (PGY-1). Pager [facility] [phone]/ [facility]     Patient is a 80y old  Female who presents with a chief complaint of fall, thrombocytopenia, ankle fracture (01 Aug 2021 06:43)      SUBJECTIVE / OVERNIGHT EVENTS:  No acute events overnight.     ADDITIONAL REVIEW OF SYSTEMS:  Patient denies fevers, chills, chest pain, shortness of breath, nausea, abdominal pain, diarrhea, constipation, dysuria, leg swelling, headache, light headedness.    MEDICATIONS  (STANDING):  artificial tears (preservative free) Ophthalmic Solution 1 Drop(s) Both EYES three times a day  citalopram 10 milliGRAM(s) Oral daily  cyanocobalamin 1000 MICROGram(s) Oral daily  ferrous    sulfate 325 milliGRAM(s) Oral daily  FIRST- Mouthwash  BLM 10 milliLiter(s) Swish and Spit three times a day  folic acid 1 milliGRAM(s) Oral daily  melatonin 3 milliGRAM(s) Oral at bedtime  nystatin    Suspension [id number] Unit(s) Swish and Swallow four times a day  valACYclovir 1000 milliGRAM(s) Oral two times a day    MEDICATIONS  (PRN):  acetaminophen    Suspension .. 660 milliGRAM(s) Oral every 6 hours PRN Mild Pain (1 - 3), Moderate Pain (4 - 6)  metoclopramide 5 milliGRAM(s) Oral every 8 hours PRN nausea/vomiting      CAPILLARY BLOOD GLUCOSE        I&O's Summary      PHYSICAL EXAM:  Vital Signs Last 24 Hrs  T(C): 36.7 (02 Aug 2021 04:20), Max: 37 (02 Aug 2021 00:18)  T(F): 98.1 (02 Aug 2021 04:20), Max: 98.6 (02 Aug 2021 00:18)  HR: 84 (02 Aug 2021 04:20) (81 - 107)  BP: 117/65 (02 Aug 2021 04:20) (99/45 - 140/54)  BP(mean): --  RR: 18 (02 Aug 2021 04:20) (18 - 19)  SpO2: 99% (02 Aug 2021 04:20) (97% - 100%)    CONSTITUTIONAL: NAD, well-developed  RESPIRATORY: Normal respiratory effort; lungs are clear to auscultation bilaterally  CARDIOVASCULAR: Regular rate and rhythm, normal S1 and S2, no murmur/rub/gallop; No lower extremity edema; Peripheral pulses are 2+ bilaterally  ABDOMEN: Nontender to palpation, normoactive bowel sounds, no rebound/guarding; No hepatosplenomegaly  MUSCLOSKELETAL: no clubbing or cyanosis of digits; no joint swelling or tenderness to palpation  PSYCH: A+O to person, place, and time; affect appropriate    LABS:                        8.3    1.73  )-----------( 33       ( 01 Aug 2021 07:48 )             25.0     08-01    141  |  110<H>  |  12  ----------------------------<  96  3.9   |  18<L>  |  0.56    Ca    8.6      01 Aug 2021 07:51  Phos  3.1     08-01  Mg     2.20     08-01                Culture - Urine (collected 31 Jul 2021 00:25)  Source: Clean Catch Clean Catch (Midstream)  Final Report (31 Jul 2021 22:18):    <10,000 CFU/mL Normal Urogenital [place]        Tele Reviewed:    RADIOLOGY & ADDITIONAL TESTS:  Results Reviewed:   Imaging Personally Reviewed:  Electrocardiogram Personally Reviewed:     PROGRESS NOTE:   Authored by Dr. Phyllis Jorgensen MD (PGY-1). Pager [facility] [phone]/ [facility]     Patient is a 80y old  Female who presents with a chief complaint of fall, thrombocytopenia, ankle fracture (01 Aug 2021 06:43)      SUBJECTIVE / OVERNIGHT EVENTS:  No acute events overnight.     ADDITIONAL REVIEW OF SYSTEMS:  Patient denies fevers, chills, chest pain, shortness of breath, nausea, abdominal pain, diarrhea, constipation, dysuria, leg swelling, headache, light headedness.    MEDICATIONS  (STANDING):  artificial tears (preservative free) Ophthalmic Solution 1 Drop(s) Both EYES three times a day  citalopram 10 milliGRAM(s) Oral daily  cyanocobalamin 1000 MICROGram(s) Oral daily  ferrous    sulfate 325 milliGRAM(s) Oral daily  FIRST- Mouthwash  BLM 10 milliLiter(s) Swish and Spit three times a day  folic acid 1 milliGRAM(s) Oral daily  melatonin 3 milliGRAM(s) Oral at bedtime  nystatin    Suspension [id number] Unit(s) Swish and Swallow four times a day  valACYclovir 1000 milliGRAM(s) Oral two times a day    MEDICATIONS  (PRN):  acetaminophen    Suspension .. 660 milliGRAM(s) Oral every 6 hours PRN Mild Pain (1 - 3), Moderate Pain (4 - 6)  metoclopramide 5 milliGRAM(s) Oral every 8 hours PRN nausea/vomiting      CAPILLARY BLOOD GLUCOSE        I&O's Summary      PHYSICAL EXAM:  Vital Signs Last 24 Hrs  T(C): 36.7 (02 Aug 2021 04:20), Max: 37 (02 Aug 2021 00:18)  T(F): 98.1 (02 Aug 2021 04:20), Max: 98.6 (02 Aug 2021 00:18)  HR: 84 (02 Aug 2021 04:20) (81 - 107)  BP: 117/65 (02 Aug 2021 04:20) (99/45 - 140/54)  BP(mean): --  RR: 18 (02 Aug 2021 04:20) (18 - 19)  SpO2: 99% (02 Aug 2021 04:20) (97% - 100%)    PHYSICAL EXAM:  CONSTITUTIONAL: NAD, frail, well-groomed  EYES: PERRLA; conjunctiva and sclera clear  ENMT: Moist oral mucosa, no pharyngeal injection or exudates; normal dentition  NECK: Supple, no palpable masses; no thyromegaly  RESPIRATORY: Normal respiratory effort; lungs are clear to auscultation bilaterally  CARDIOVASCULAR: Regular rate and rhythm, normal S1 and S2, no murmur/rub/gallop; No lower extremity edema; Peripheral pulses are 2+ bilaterally  ABDOMEN: Nontender to palpation, normoactive bowel sounds, no rebound/guarding; No hepatosplenomegaly  MUSCULOSKELETAL:  Normal gait; no clubbing or cyanosis of digits; no joint swelling or tenderness to palpation; Left ankle in cast  PSYCH: A+O to person, place, and time; affect appropriate  NEUROLOGY: CN 2-12 are intact and symmetric; no gross sensory deficits   SKIN: No rashes; no palpable lesions    LABS:                        8.3    1.73  )-----------( 33       ( 01 Aug 2021 07:48 )             25.0     08-01    141  |  110<H>  |  12  ----------------------------<  96  3.9   |  18<L>  |  0.56    Ca    8.6      01 Aug 2021 07:51  Phos  3.1     08-01  Mg     2.20     08-01                Culture - Urine (collected 31 Jul 2021 00:25)  Source: Clean Catch Clean Catch (Midstream)  Final Report (31 Jul 2021 22:18):    <10,000 CFU/mL Normal Urogenital [place]        Tele Reviewed:    RADIOLOGY & ADDITIONAL TESTS:  Results Reviewed:   Imaging Personally Reviewed:  Electrocardiogram Personally Reviewed:     PROGRESS NOTE:   Authored by Dr. Phyllis Jorgensen MD (PGY-1). Pager [facility] [phone]/ [facility]     Patient is a 80y old  Female who presents with a chief complaint of fall, thrombocytopenia, ankle fracture (01 Aug 2021 06:43)      SUBJECTIVE / OVERNIGHT EVENTS:  No acute events overnight.     Patient says fatigue and generalized weakness have improved, was able to get out of bed and into the chair/onto commode yesterday with help. Good oral hydration. Endorses mouth pain due to mouth sores (on valacyclovir). Also endorses mild right knee pain and swelling s/p fall. Denies any bleeding, lightheadedness.    ADDITIONAL REVIEW OF SYSTEMS:  Patient denies fevers, chills, chest pain, shortness of breath, nausea, abdominal pain, diarrhea, constipation, dysuria, leg swelling, headache, light headedness.    MEDICATIONS  (STANDING):  artificial tears (preservative free) Ophthalmic Solution 1 Drop(s) Both EYES three times a day  citalopram 10 milliGRAM(s) Oral daily  cyanocobalamin 1000 MICROGram(s) Oral daily  ferrous    sulfate 325 milliGRAM(s) Oral daily  FIRST- Mouthwash  BLM 10 milliLiter(s) Swish and Spit three times a day  folic acid 1 milliGRAM(s) Oral daily  melatonin 3 milliGRAM(s) Oral at bedtime  nystatin    Suspension [id number] Unit(s) Swish and Swallow four times a day  valACYclovir 1000 milliGRAM(s) Oral two times a day    MEDICATIONS  (PRN):  acetaminophen    Suspension .. 660 milliGRAM(s) Oral every 6 hours PRN Mild Pain (1 - 3), Moderate Pain (4 - 6)  metoclopramide 5 milliGRAM(s) Oral every 8 hours PRN nausea/vomiting      CAPILLARY BLOOD GLUCOSE        I&O's Summary      PHYSICAL EXAM:  Vital Signs Last 24 Hrs  T(C): 36.7 (02 Aug 2021 04:20), Max: 37 (02 Aug 2021 00:18)  T(F): 98.1 (02 Aug 2021 04:20), Max: 98.6 (02 Aug 2021 00:18)  HR: 84 (02 Aug 2021 04:20) (81 - 107)  BP: 117/65 (02 Aug 2021 04:20) (99/45 - 140/54)  BP(mean): --  RR: 18 (02 Aug 2021 04:20) (18 - 19)  SpO2: 99% (02 Aug 2021 04:20) (97% - 100%)    PHYSICAL EXAM:  CONSTITUTIONAL: NAD, frail, well-groomed  EYES: PERRLA; conjunctiva and sclera clear  ENMT: Moist oral mucosa, no pharyngeal injection or exudates; normal dentition  NECK: Supple, no palpable masses; no thyromegaly  RESPIRATORY: Normal respiratory effort; lungs are clear to auscultation bilaterally  CARDIOVASCULAR: Regular rate and rhythm, normal S1 and S2, no murmur/rub/gallop; No lower extremity edema; Peripheral pulses are 2+ bilaterally  ABDOMEN: Nontender to palpation, normoactive bowel sounds, no rebound/guarding; No hepatosplenomegaly  MUSCULOSKELETAL:  Medial aspect of right knee tender to palpation, w/ swelling; eft ankle splinted and wrapped  PSYCH: A+O to person, place, and time; affect appropriate  NEUROLOGY: CN 2-12 are intact and symmetric; no gross sensory deficits   SKIN: No rashes; no palpable lesions    LABS:                        8.3    1.73  )-----------( 33       ( 01 Aug 2021 07:48 )             25.0     08-01    141  |  110<H>  |  12  ----------------------------<  96  3.9   |  18<L>  |  0.56    Ca    8.6      01 Aug 2021 07:51  Phos  3.1     08-01  Mg     2.20     08-01                Culture - Urine (collected 31 Jul 2021 00:25)  Source: Clean Catch Clean Catch (Midstream)  Final Report (31 Jul 2021 22:18):    <10,000 CFU/mL Normal Urogenital [place]        Tele Reviewed:    RADIOLOGY & ADDITIONAL TESTS:  Results Reviewed:   Imaging Personally Reviewed:  Electrocardiogram Personally Reviewed:

## 2021-08-02 NOTE — PROGRESS NOTE ADULT - ASSESSMENT
80 year old woman with history of ANCA vasculitis diagnosed 20 years , PE with b/l DVT 04/21 on apixaban and DLBCL s/p C4 R-CHOP 7/22/21 and IT MTX 5/20, LP negative for lymphoma. Plan was to repeat PET after C4 presenting with weakness and fall found to have thrombocytopenia (7) s/p 1 unit of plts. CTH negative for bleeding. c/b L ankle fx s/p splinting by ortho. Hematology consulted for further management of thrombocytopenia in the setting of recent chemotherapy.    #DLBCL  -Pt follows with Dr. Madrigal  -s/p C4 R-CHOP 7/22;   -PET 06/2021 with decrease in size of lymphoma  -At this time pt does not meet requirement for inpatient treatment; will likely delay next cycle due to delayed recovery and change to Mini- RCHOP     #Thrombocytopenia  -In the setting of chemotherapy  -s/p 1 unit plt transfusion  -CTH negative for ICH  -daily CBC  -Transfuse for plt <10 and Plt <20 with fevers    #Neutropenia  -Hold off on any bone marrow stimulant for now  -Check daily ANC    #DVT/PE  -Continue to hold elliquis   -Will monitor plt curve, if stable can restart eliquis once plts above 50K    #Fall  -Found to have L ankle fracture s/p closed reduction and splinting by ortho team  -Pt also with Right Knee pain ,Patient is now agreeable to imaging in order to reduce pain and movement restrictions; Recommend X rays and/or US Right knee  -f/u OP ortho in 1 week from discharge  -pain control  -PT recommending YOHANA    Please page with questions or concerns. Will Follow with you.      Sunita Carson   PGY4 Hematology/Oncology   [phone]   After 5pm, please contact on-call team.

## 2021-08-02 NOTE — PROGRESS NOTE ADULT - PROBLEM SELECTOR PLAN 2
- EBV+ DLBCL - diagnosed 4/13/21 after presenting to the ED with SOB/CP found to have RML RLL PE and b/l LE DVT along with lung masses up to 8.6cm s/p VATS/biopsy confirming EBV+ DLBCL. s/p chemoport.   - PET/CT:  Status post wedge resection of left upper lobe lung nodule, as compared to CT dated 4/13/2021. Additional FDG-avid, partially necrotic bilateral lung masses and mildly avid groundglass opacities are not significantly changed as compared to prior CT, compatible with biopsy-proven lymphoma. 2. Few mildly FDG-avid, mildly enlarged mediastinal and bilateral hilar lymph nodes are nonspecific. 3. Previously seen indeterminate3.1 cm lesion in the upper pole of right kidney is FDG-avid.  4. FDG-avid left adrenal nodule is indeterminate.   - no inpatient tx per heme onc - EBV+ DLBCL - diagnosed 4/13/21 after presenting to the ED with SOB/CP found to have RML RLL PE and b/l LE DVT along with lung masses up to 8.6cm s/p VATS/biopsy confirming EBV+ DLBCL. s/p chemoport.   - PET/CT on 6/25/21:  Status post wedge resection of left upper lobe lung nodule, as compared to CT dated 4/13/2021. Additional FDG-avid, partially necrotic bilateral lung masses and mildly avid groundglass opacities are not significantly changed as compared to prior CT, compatible with biopsy-proven lymphoma. 2. Few mildly FDG-avid, mildly enlarged mediastinal and bilateral hilar lymph nodes are nonspecific. 3. Previously seen indeterminate3.1 cm lesion in the upper pole of right kidney is FDG-avid.  4. FDG-avid left adrenal nodule is indeterminate.   - Last chemo on 7/22. Next chemo scheduled for 8/12  - no inpatient tx per heme onc

## 2021-08-02 NOTE — PROGRESS NOTE ADULT - SUBJECTIVE AND OBJECTIVE BOX
Hematology Oncology Follow-up    INTERVAL HPI/OVERNIGHT EVENTS:  No o/n events, patient resting comfortably. reports feeling much better although does not like her left foot cast as it limits her movement. Continues to have pain in the right knee    VITAL SIGNS:  T(F): 98.1 (08-02-21 @ 15:55)  HR: 97 (08-02-21 @ 15:55)  BP: 136/66 (08-02-21 @ 15:55)  RR: 18 (08-02-21 @ 15:55)  SpO2: 98% (08-02-21 @ 15:55)  Wt(kg): --      PHYSICAL EXAM:    Constitutional: AAOx3, NAD  Eyes: PERRL, EOMI, sclera non-icteric  Neck: supple, no masses, no JVD, no lymphadenopathy  Respiratory: CTA b/l, no wheezing, rhonchi, rales, with normal respiratory effort  Cardiovascular: RRR, normal S1S2, no M/R/G  Gastrointestinal: soft, NTND, no masses palpable, BS normal in all four quadrants, no HSM  Extremities:  no edema  MSK: no obvious abnormalities, normal ROM, no lymphadenopathy  Neurological: Grossly intact  Skin: Normal temperature, no rash, no echymoses, no petichiae  Psych: normal affect    MEDICATIONS  (STANDING):  artificial tears (preservative free) Ophthalmic Solution 1 Drop(s) Both EYES three times a day  citalopram 10 milliGRAM(s) Oral daily  cyanocobalamin 1000 MICROGram(s) Oral daily  ferrous    sulfate 325 milliGRAM(s) Oral daily  FIRST- Mouthwash  BLM 10 milliLiter(s) Swish and Spit three times a day  folic acid 1 milliGRAM(s) Oral daily  melatonin 3 milliGRAM(s) Oral at bedtime  nystatin    Suspension 013039 Unit(s) Swish and Swallow four times a day  valACYclovir 1000 milliGRAM(s) Oral two times a day    MEDICATIONS  (PRN):  acetaminophen    Suspension .. 660 milliGRAM(s) Oral every 6 hours PRN Mild Pain (1 - 3), Moderate Pain (4 - 6)  metoclopramide 5 milliGRAM(s) Oral every 8 hours PRN nausea/vomiting      codeine (Nausea)  penicillin (Hives)      LABS:                        7.5    3.59  )-----------( 43       ( 02 Aug 2021 07:51 )             22.5     08-02    139  |  110<H>  |  14  ----------------------------<  93  3.6   |  17<L>  |  0.65    Ca    8.5      02 Aug 2021 07:20  Phos  2.8     08-02  Mg     1.90     08-02                       RADIOLOGY & ADDITIONAL TESTS:  Studies reviewed. Hematology Oncology Follow-up    INTERVAL HPI/OVERNIGHT EVENTS:  No o/n events, patient resting comfortably. reports feeling much better although does not like her left foot cast as it limits her movement. Continues to have pain in the right knee    VITAL SIGNS:  T(F): 98.1 (08-02-21 @ 15:55)  HR: 97 (08-02-21 @ 15:55)  BP: 136/66 (08-02-21 @ 15:55)  RR: 18 (08-02-21 @ 15:55)  SpO2: 98% (08-02-21 @ 15:55)  Wt(kg): --      PHYSICAL EXAM:    Constitutional: AAOx3, NAD  Eyes: PERRL, EOMI, sclera non-icteric  Neck: supple, no masses, no JVD, no lymphadenopathy  Respiratory: CTA b/l, no wheezing, rhonchi, rales, with normal respiratory effort  Cardiovascular: RRR, normal S1S2, no M/R/G  Gastrointestinal: soft, NTND, no masses palpable, BS normal in all four quadrants, no HSM  Extremities:  Cast on left lower extremity; Swelling of right knee  MSK: no obvious abnormalities, normal ROM, no lymphadenopathy  Neurological: Grossly intact  Skin: Normal temperature, no rash, no echymoses, no petichiae  Psych: normal affect    MEDICATIONS  (STANDING):  artificial tears (preservative free) Ophthalmic Solution 1 Drop(s) Both EYES three times a day  citalopram 10 milliGRAM(s) Oral daily  cyanocobalamin 1000 MICROGram(s) Oral daily  ferrous    sulfate 325 milliGRAM(s) Oral daily  FIRST- Mouthwash  BLM 10 milliLiter(s) Swish and Spit three times a day  folic acid 1 milliGRAM(s) Oral daily  melatonin 3 milliGRAM(s) Oral at bedtime  nystatin    Suspension [id number] Unit(s) Swish and Swallow four times a day  valACYclovir 1000 milliGRAM(s) Oral two times a day    MEDICATIONS  (PRN):  acetaminophen    Suspension .. 660 milliGRAM(s) Oral every 6 hours PRN Mild Pain (1 - 3), Moderate Pain (4 - 6)  metoclopramide 5 milliGRAM(s) Oral every 8 hours PRN nausea/vomiting      codeine (Nausea)  penicillin (Hives)      LABS:                        7.5    3.59  )-----------( 43       ( 02 Aug 2021 07:51 )             22.5     08-02    139  |  110<H>  |  14  ----------------------------<  93  3.6   |  17<L>  |  0.65    Ca    8.5      02 Aug 2021 07:20  Phos  2.8     08-02  Mg     1.90     08-02                       RADIOLOGY & ADDITIONAL TESTS:  Studies reviewed.

## 2021-08-02 NOTE — PROGRESS NOTE ADULT - PROBLEM SELECTOR PLAN 3
seen by ortho  - Pain control - prefers tylenol   - NWB LLE in trilaminar splint   - Keep splint clean, dry and intact until follow up  - Cane/crutches/walker as needed  - Ice/elevation  - Patient counseled on possible need for operative intervention  - Follow up with Dr. Greene in 1 week, call office for appointment concern for pathologic fracture  - ortho consult  - Pain control - prefers tylenol   - NWB LLE in trilaminar splint   - Keep splint clean, dry and intact until follow up  - Patient counseled on possible need for operative intervention Recent fall. Found to have left ankle fracture s/p closed reduction and splinting by ortho team. Ortho team recommended patient get R knee xrays however patient refused as she was able to ambulate on the RLE. Patient now amenable to R knee XR due to persistent pain.    - ortho consult  - Pain control - prefers tylenol   - NWB LLE in trilaminar splint   - Keep splint clean, dry and intact until follow up  - Patient counseled on possible need for operative intervention  - Ordered right knee XR

## 2021-08-03 DIAGNOSIS — M25.561 PAIN IN RIGHT KNEE: ICD-10-CM

## 2021-08-03 LAB
ANION GAP SERPL CALC-SCNC: 14 MMOL/L — SIGNIFICANT CHANGE UP (ref 7–14)
BASOPHILS # BLD AUTO: 0.11 K/UL — SIGNIFICANT CHANGE UP (ref 0–0.2)
BASOPHILS NFR BLD AUTO: 2.4 % — HIGH (ref 0–2)
BUN SERPL-MCNC: 11 MG/DL — SIGNIFICANT CHANGE UP (ref 7–23)
CALCIUM SERPL-MCNC: 8.5 MG/DL — SIGNIFICANT CHANGE UP (ref 8.4–10.5)
CHLORIDE SERPL-SCNC: 110 MMOL/L — HIGH (ref 98–107)
CO2 SERPL-SCNC: 17 MMOL/L — LOW (ref 22–31)
CREAT SERPL-MCNC: 0.55 MG/DL — SIGNIFICANT CHANGE UP (ref 0.5–1.3)
EOSINOPHIL # BLD AUTO: 0.04 K/UL — SIGNIFICANT CHANGE UP (ref 0–0.5)
EOSINOPHIL NFR BLD AUTO: 0.9 % — SIGNIFICANT CHANGE UP (ref 0–6)
GLUCOSE SERPL-MCNC: 91 MG/DL — SIGNIFICANT CHANGE UP (ref 70–99)
HCT VFR BLD CALC: 22.1 % — LOW (ref 34.5–45)
HGB BLD-MCNC: 7.1 G/DL — LOW (ref 11.5–15.5)
IANC: 2.94 K/UL — SIGNIFICANT CHANGE UP (ref 1.5–8.5)
IMM GRANULOCYTES NFR BLD AUTO: 2.2 % — HIGH (ref 0–1.5)
LYMPHOCYTES # BLD AUTO: 0.75 K/UL — LOW (ref 1–3.3)
LYMPHOCYTES # BLD AUTO: 16.4 % — SIGNIFICANT CHANGE UP (ref 13–44)
MAGNESIUM SERPL-MCNC: 1.8 MG/DL — SIGNIFICANT CHANGE UP (ref 1.6–2.6)
MCHC RBC-ENTMCNC: 32.1 GM/DL — SIGNIFICANT CHANGE UP (ref 32–36)
MCHC RBC-ENTMCNC: 35.7 PG — HIGH (ref 27–34)
MCV RBC AUTO: 111.1 FL — HIGH (ref 80–100)
MONOCYTES # BLD AUTO: 0.63 K/UL — SIGNIFICANT CHANGE UP (ref 0–0.9)
MONOCYTES NFR BLD AUTO: 13.8 % — SIGNIFICANT CHANGE UP (ref 2–14)
NEUTROPHILS # BLD AUTO: 2.94 K/UL — SIGNIFICANT CHANGE UP (ref 1.8–7.4)
NEUTROPHILS NFR BLD AUTO: 64.3 % — SIGNIFICANT CHANGE UP (ref 43–77)
NRBC # BLD: 0 /100 WBCS — SIGNIFICANT CHANGE UP
NRBC # FLD: 0.02 K/UL — HIGH
PHOSPHATE SERPL-MCNC: 3 MG/DL — SIGNIFICANT CHANGE UP (ref 2.5–4.5)
PLATELET # BLD AUTO: 67 K/UL — LOW (ref 150–400)
POTASSIUM SERPL-MCNC: 4.4 MMOL/L — SIGNIFICANT CHANGE UP (ref 3.5–5.3)
POTASSIUM SERPL-SCNC: 4.4 MMOL/L — SIGNIFICANT CHANGE UP (ref 3.5–5.3)
RBC # BLD: 1.99 M/UL — LOW (ref 3.8–5.2)
RBC # FLD: 14.8 % — HIGH (ref 10.3–14.5)
SODIUM SERPL-SCNC: 141 MMOL/L — SIGNIFICANT CHANGE UP (ref 135–145)
WBC # BLD: 4.57 K/UL — SIGNIFICANT CHANGE UP (ref 3.8–10.5)
WBC # FLD AUTO: 4.57 K/UL — SIGNIFICANT CHANGE UP (ref 3.8–10.5)

## 2021-08-03 PROCEDURE — 99233 SBSQ HOSP IP/OBS HIGH 50: CPT | Mod: GC

## 2021-08-03 PROCEDURE — 73564 X-RAY EXAM KNEE 4 OR MORE: CPT | Mod: 26,RT

## 2021-08-03 RX ORDER — SALIVA SUBSTITUTE COMB NO.11 351 MG
30 POWDER IN PACKET (EA) MUCOUS MEMBRANE DAILY
Refills: 0 | Status: DISCONTINUED | OUTPATIENT
Start: 2021-08-03 | End: 2021-08-06

## 2021-08-03 RX ADMIN — Medication 1 DROP(S): at 05:22

## 2021-08-03 RX ADMIN — PREGABALIN 1000 MICROGRAM(S): 225 CAPSULE ORAL at 13:06

## 2021-08-03 RX ADMIN — CITALOPRAM 10 MILLIGRAM(S): 10 TABLET, FILM COATED ORAL at 13:05

## 2021-08-03 RX ADMIN — Medication 1 DROP(S): at 13:06

## 2021-08-03 RX ADMIN — Medication 30 MILLILITER(S): at 13:06

## 2021-08-03 RX ADMIN — Medication 500000 UNIT(S): at 05:22

## 2021-08-03 RX ADMIN — Medication 3 MILLIGRAM(S): at 22:45

## 2021-08-03 RX ADMIN — Medication 1 DROP(S): at 22:45

## 2021-08-03 RX ADMIN — Medication 500000 UNIT(S): at 00:58

## 2021-08-03 RX ADMIN — VALACYCLOVIR 1000 MILLIGRAM(S): 500 TABLET, FILM COATED ORAL at 05:22

## 2021-08-03 RX ADMIN — Medication 1 MILLIGRAM(S): at 13:05

## 2021-08-03 RX ADMIN — Medication 500000 UNIT(S): at 13:04

## 2021-08-03 RX ADMIN — DIPHENHYDRAMINE HYDROCHLORIDE AND LIDOCAINE HYDROCHLORIDE AND ALUMINUM HYDROXIDE AND MAGNESIUM HYDRO 10 MILLILITER(S): KIT at 05:22

## 2021-08-03 RX ADMIN — VALACYCLOVIR 1000 MILLIGRAM(S): 500 TABLET, FILM COATED ORAL at 17:13

## 2021-08-03 RX ADMIN — Medication 325 MILLIGRAM(S): at 13:05

## 2021-08-03 NOTE — PROGRESS NOTE ADULT - PROBLEM SELECTOR PLAN 2
- EBV+ DLBCL - diagnosed 4/13/21 after presenting to the ED with SOB/CP found to have RML RLL PE and b/l LE DVT along with lung masses up to 8.6cm s/p VATS/biopsy confirming EBV+ DLBCL. s/p chemoport.   - PET/CT on 6/25/21:  Status post wedge resection of left upper lobe lung nodule, as compared to CT dated 4/13/2021. Additional FDG-avid, partially necrotic bilateral lung masses and mildly avid groundglass opacities are not significantly changed as compared to prior CT, compatible with biopsy-proven lymphoma. 2. Few mildly FDG-avid, mildly enlarged mediastinal and bilateral hilar lymph nodes are nonspecific. 3. Previously seen indeterminate3.1 cm lesion in the upper pole of right kidney is FDG-avid.  4. FDG-avid left adrenal nodule is indeterminate.   - Last chemo on 7/22. Next chemo scheduled for 8/12  - no inpatient tx per heme onc likely 2/2 last chemo treatment on 7/29    - s/p 1U plt transfusion in ED  - CTH without bleeding  - appreciate heme onc recs about restarting eliquis given plt of 67 today  - c/w folate, B12, and iron

## 2021-08-03 NOTE — CHART NOTE - NSCHARTNOTEFT_GEN_A_CORE
Reviewed XR of R knee with primary team. Patient initially refusing XR of R knee on initial evaluation, XR recommended to rule out fracture in the setting of knee pain and mild effusion on exam. No obvious effusion on XR though evaluation limited by obliquity. No fracture or dislocation. Lateral compartment osteoarthritis and diffuse osteopenia are present.     A/P:  Clinical picture consistent with hemarthrosis in the setting of anticoagulation and recent trauma.   - WBAT RLE with assistive devices as needed  - May apply compressive wrap to knee  - Must weight risks of worsening hemarthrosis with risks of continuing to hold anticoagulation  - Ice/elevate affected extremity  - PT/OT/OOB  - NWB LLE in splint, no plan for inpatient operative intervention    We will sign off at this time. Please re-consult with questions.     Patient should follow up with Dr. Greene in the office 1 week after discharge. Call [phone] for appt.    Thelma Payan PGY-2  Orthopaedic Surgery  [facility] [id number]  [place] [id number]  [facility] [phone]

## 2021-08-03 NOTE — CHART NOTE - NSCHARTNOTEFT_GEN_A_CORE
Spoke with heme/onc about patient's disposition/discharge planning. Recommended calling ortho before restarting Eliquis in case they are planning for procedural/surgical intervention. Hold off on pRBC transfusion until closer to discharge. Plan is for patient to resume R-CHOP at [facility] on 12/12. Chemo can be delayed to 12/19 if patient is discharged to [facility] not allowing chemo tx. Spoke with heme/onc about patient's disposition/discharge planning. Recommended calling ortho before restarting Eliquis in case they are planning for procedural/surgical intervention. Hold off on pRBC transfusion until closer to discharge. Plan is for patient to resume R-CHOP at [facility] on 12/12. Chemo can be delayed to 12/19 if patient is discharged to [facility] not allowing chemo tx.    Phyllis Jorgensen  PGY-1 Medicine Spoke with heme/onc about patient's disposition/discharge planning. Recommended calling ortho before restarting Eliquis in case they are planning for procedural/surgical intervention. Hold off on pRBC transfusion until closer to discharge. Plan is for patient to resume R-CHOP at [facility] on 8/12. Chemo can be delayed to 8/19 if patient is discharged to [facility] not allowing chemo tx.    Phyllis Jorgensen  PGY-1 Medicine

## 2021-08-03 NOTE — PROGRESS NOTE ADULT - ATTENDING COMMENTS
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 Ortho – NWB LLE, evaluate for other bony mets vs Rt knee hemarthosis.  Improved Plt level but will hold off on resuming Eliquis until hemarthrosis is ruled out. H/H - will inquire about transfusion as patient is slated for [facility].

## 2021-08-03 NOTE — PROGRESS NOTE ADULT - ASSESSMENT
Ms. 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 XR left ankle showed left fibular fracture. S/p 1 platelet transfusion.

## 2021-08-03 NOTE — PROGRESS NOTE ADULT - PROBLEM SELECTOR PLAN 1
- s/p 1U plt transfusion in ED  - post-transfusion cbc showed plt of 40, up from 7   - CTH without bleeding - s/p 1U plt transfusion in ED  - CTH without bleeding i/s/o recent fall. Ortho team recommended patient get R knee xrays due to small effusion on exam c/f mild hemarthrosis however patient refused as she was able to ambulate on the RLE. Patient now agreeable to R knee XR due to persistent pain    -f/u right knee XR

## 2021-08-03 NOTE — PROGRESS NOTE ADULT - SUBJECTIVE AND OBJECTIVE BOX
PROGRESS NOTE:   Authored by Dr. Phyllis Jorgensen MD (PGY-1). Pager [facility] [phone]/ [facility]     Patient is a 80y old  Female who presents with a chief complaint of fall, thrombocytopenia, ankle fracture (02 Aug 2021 18:58)      SUBJECTIVE / OVERNIGHT EVENTS:  No acute events overnight.     ADDITIONAL REVIEW OF SYSTEMS:  Patient denies fevers, chills, chest pain, shortness of breath, nausea, abdominal pain, diarrhea, constipation, dysuria, leg swelling, headache, light headedness.    MEDICATIONS  (STANDING):  artificial tears (preservative free) Ophthalmic Solution 1 Drop(s) Both EYES three times a day  citalopram 10 milliGRAM(s) Oral daily  cyanocobalamin 1000 MICROGram(s) Oral daily  ferrous    sulfate 325 milliGRAM(s) Oral daily  FIRST- Mouthwash  BLM 10 milliLiter(s) Swish and Spit three times a day  folic acid 1 milliGRAM(s) Oral daily  melatonin 3 milliGRAM(s) Oral at bedtime  nystatin    Suspension 140682 Unit(s) Swish and Swallow four times a day  valACYclovir 1000 milliGRAM(s) Oral two times a day    MEDICATIONS  (PRN):  acetaminophen    Suspension .. 660 milliGRAM(s) Oral every 6 hours PRN Mild Pain (1 - 3), Moderate Pain (4 - 6)  metoclopramide 5 milliGRAM(s) Oral every 8 hours PRN nausea/vomiting      CAPILLARY BLOOD GLUCOSE        I&O's Summary      PHYSICAL EXAM:  Vital Signs Last 24 Hrs  T(C): 36.6 (03 Aug 2021 03:55), Max: 36.7 (02 Aug 2021 15:55)  T(F): 97.8 (03 Aug 2021 03:55), Max: 98.1 (02 Aug 2021 15:55)  HR: 84 (03 Aug 2021 03:55) (83 - 101)  BP: 148/52 (03 Aug 2021 03:55) (101/53 - 148/52)  BP(mean): --  RR: 18 (03 Aug 2021 03:55) (18 - 18)  SpO2: 100% (03 Aug 2021 03:55) (97% - 100%)    CONSTITUTIONAL: NAD, well-developed  HEET: MMM, EOMI, PERRLA  NECK: supple  RESPIRATORY: Normal respiratory effort; lungs are clear to auscultation bilaterally  CARDIOVASCULAR: Regular rate and rhythm, normal S1 and S2, no murmur/rub/gallop; No lower extremity edema; Peripheral pulses are 2+ bilaterally  ABDOMEN: Nontender to palpation, normoactive bowel sounds, no rebound/guarding; No hepatosplenomegaly  MUSCULOSKELETAL: no clubbing or cyanosis of digits; no joint swelling or tenderness to palpation  NEURO: Moving all four extremities, sensation grossly intact  PSYCH: A+O to person, place, and time; affect appropriate  SKIN: No rash    LABS:                        7.1    4.57  )-----------( 67       ( 03 Aug 2021 06:43 )             22.1     08-03    141  |  110<H>  |  11  ----------------------------<  91  4.4   |  17<L>  |  0.55    Ca    8.5      03 Aug 2021 06:23  Phos  3.0     08-03  Mg     1.80     08-03                  Tele Reviewed:    RADIOLOGY & ADDITIONAL TESTS:  Results Reviewed:   Imaging Personally Reviewed:  Electrocardiogram Personally Reviewed:     PROGRESS NOTE:   Authored by Dr. Phyllis Jorgensen MD (PGY-1). Pager [facility] [phone]/ [facility]     Patient is a 80y old  Female who presents with a chief complaint of fall, thrombocytopenia, ankle fracture (02 Aug 2021 18:58)      SUBJECTIVE / OVERNIGHT EVENTS:  No acute events overnight.   Agreeable to x-ray this morning.    ADDITIONAL REVIEW OF SYSTEMS:  Patient denies fevers, chills, chest pain, shortness of breath, nausea, abdominal pain, diarrhea, constipation, dysuria, leg swelling, headache, light headedness.    MEDICATIONS  (STANDING):  artificial tears (preservative free) Ophthalmic Solution 1 Drop(s) Both EYES three times a day  citalopram 10 milliGRAM(s) Oral daily  cyanocobalamin 1000 MICROGram(s) Oral daily  ferrous    sulfate 325 milliGRAM(s) Oral daily  FIRST- Mouthwash  BLM 10 milliLiter(s) Swish and Spit three times a day  folic acid 1 milliGRAM(s) Oral daily  melatonin 3 milliGRAM(s) Oral at bedtime  nystatin    Suspension 137886 Unit(s) Swish and Swallow four times a day  valACYclovir 1000 milliGRAM(s) Oral two times a day    MEDICATIONS  (PRN):  acetaminophen    Suspension .. 660 milliGRAM(s) Oral every 6 hours PRN Mild Pain (1 - 3), Moderate Pain (4 - 6)  metoclopramide 5 milliGRAM(s) Oral every 8 hours PRN nausea/vomiting      CAPILLARY BLOOD GLUCOSE        I&O's Summary      PHYSICAL EXAM:  Vital Signs Last 24 Hrs  T(C): 36.6 (03 Aug 2021 03:55), Max: 36.7 (02 Aug 2021 15:55)  T(F): 97.8 (03 Aug 2021 03:55), Max: 98.1 (02 Aug 2021 15:55)  HR: 84 (03 Aug 2021 03:55) (83 - 101)  BP: 148/52 (03 Aug 2021 03:55) (101/53 - 148/52)  BP(mean): --  RR: 18 (03 Aug 2021 03:55) (18 - 18)  SpO2: 100% (03 Aug 2021 03:55) (97% - 100%)    CONSTITUTIONAL: NAD, well-developed  HEET: MMM, EOMI, PERRLA  NECK: supple  RESPIRATORY: Normal respiratory effort; lungs are clear to auscultation bilaterally  CARDIOVASCULAR: Regular rate and rhythm, normal S1 and S2, no murmur/rub/gallop; No lower extremity edema; Peripheral pulses are 2+ bilaterally  ABDOMEN: Nontender to palpation, normoactive bowel sounds, no rebound/guarding; No hepatosplenomegaly  MUSCULOSKELETAL: no clubbing or cyanosis of digits; no joint swelling or tenderness to palpation  NEURO: Moving all four extremities, sensation grossly intact  PSYCH: A+O to person, place, and time; affect appropriate  SKIN: No rash    LABS:                        7.1    4.57  )-----------( 67       ( 03 Aug 2021 06:43 )             22.1     08-03    141  |  110<H>  |  11  ----------------------------<  91  4.4   |  17<L>  |  0.55    Ca    8.5      03 Aug 2021 06:23  Phos  3.0     08-03  Mg     1.80     08-03                  Tele Reviewed:    RADIOLOGY & ADDITIONAL TESTS:  Results Reviewed:   Imaging Personally Reviewed:  Electrocardiogram Personally Reviewed:     PROGRESS NOTE:   Authored by Dr. Phyllis Jorgensen MD (PGY-1). Pager [facility] [phone]/ [facility]     Patient is a 80y old  Female who presents with a chief complaint of fall, thrombocytopenia, ankle fracture (02 Aug 2021 18:58)      SUBJECTIVE / OVERNIGHT EVENTS:  No acute events overnight.     Patient complaining of worsening right knee pain. Agreeable to x-ray this morning. Also complaining of mouth sores that make it painful to eat. Good oral hydration.    Denies chills, any bleeding,     ADDITIONAL REVIEW OF SYSTEMS:  Patient denies fevers, chills, chest pain, shortness of breath, nausea, abdominal pain, diarrhea, constipation, dysuria, leg swelling, headache, light headedness.    MEDICATIONS  (STANDING):  artificial tears (preservative free) Ophthalmic Solution 1 Drop(s) Both EYES three times a day  citalopram 10 milliGRAM(s) Oral daily  cyanocobalamin 1000 MICROGram(s) Oral daily  ferrous    sulfate 325 milliGRAM(s) Oral daily  FIRST- Mouthwash  BLM 10 milliLiter(s) Swish and Spit three times a day  folic acid 1 milliGRAM(s) Oral daily  melatonin 3 milliGRAM(s) Oral at bedtime  nystatin    Suspension [id number] Unit(s) Swish and Swallow four times a day  valACYclovir 1000 milliGRAM(s) Oral two times a day    MEDICATIONS  (PRN):  acetaminophen    Suspension .. 660 milliGRAM(s) Oral every 6 hours PRN Mild Pain (1 - 3), Moderate Pain (4 - 6)  metoclopramide 5 milliGRAM(s) Oral every 8 hours PRN nausea/vomiting      CAPILLARY BLOOD GLUCOSE        I&O's Summary      PHYSICAL EXAM:  Vital Signs Last 24 Hrs  T(C): 36.6 (03 Aug 2021 03:55), Max: 36.7 (02 Aug 2021 15:55)  T(F): 97.8 (03 Aug 2021 03:55), Max: 98.1 (02 Aug 2021 15:55)  HR: 84 (03 Aug 2021 03:55) (83 - 101)  BP: 148/52 (03 Aug 2021 03:55) (101/53 - 148/52)  BP(mean): --  RR: 18 (03 Aug 2021 03:55) (18 - 18)  SpO2: 100% (03 Aug 2021 03:55) (97% - 100%)    CONSTITUTIONAL: NAD, well-developed  HEET: MMM, EOMI, PERRLA, mouth sore on tip of tongue   NECK: supple  RESPIRATORY: Normal respiratory effort; lungs are clear to auscultation bilaterally  CARDIOVASCULAR: Regular rate and rhythm, normal S1 and S2, no murmur/rub/gallop; No lower extremity edema; Peripheral pulses are 2+ bilaterally  ABDOMEN: Nontender to palpation, normoactive bowel sounds, no rebound/guarding; No hepatosplenomegaly  MUSCULOSKELETAL: medial aspect of right knee tender to palpation, right knee swelling, full ROM, pain with flexion; no clubbing or cyanosis of digits  NEURO: Moving all four extremities, sensation grossly intact  PSYCH: A+O to person, place, and time; affect appropriate  SKIN: No rash    LABS:                        7.1    4.57  )-----------( 67       ( 03 Aug 2021 06:43 )             22.1     08-03    141  |  110<H>  |  11  ----------------------------<  91  4.4   |  17<L>  |  0.55    Ca    8.5      03 Aug 2021 06:23  Phos  3.0     08-03  Mg     1.80     08-03                  Tele Reviewed:    RADIOLOGY & ADDITIONAL TESTS:  Results Reviewed:   Imaging Personally Reviewed:  Electrocardiogram Personally Reviewed:

## 2021-08-03 NOTE — PROGRESS NOTE ADULT - PROBLEM SELECTOR PLAN 5
Hgb 8.2 .9. Ortho team noted small effusion on exam c/f mild hemarthrosis    - c/w folate, B12, and iron  - ordered right knee XR - over last few days, more weakness than usual. difficulty walking, sleeping more than usual.   - has not felt this tired after previous chemo cycles - feels extreme weakness this time.

## 2021-08-03 NOTE — PROGRESS NOTE ADULT - PROBLEM SELECTOR PLAN 4
- over last few days, more weakness than usual. difficulty walking, sleeping more than usual.   - has not felt this tired after previous chemo cycles - feels extreme weakness this time. Recent fall. Found to have left ankle fracture s/p closed reduction and splinting by ortho team.    - ortho consult  - Pain control - prefers tylenol   - NWB LLE in trilaminar splint   - Keep splint clean, dry and intact until follow up  - Patient counseled on possible need for operative intervention

## 2021-08-03 NOTE — PROGRESS NOTE ADULT - PROBLEM SELECTOR PLAN 6
- hold apixaban until thrombocytopenia resolved  - SCDs    Discharge planning: Pt rec [facility]. Discuss plans for chemo w/ heme onc. Pt concerned she will not be able to get chemo in rehab. - hold apixaban until thrombocytopenia resolved  - SCDs    Discharge planning: Pt rec [facility].

## 2021-08-03 NOTE — PROGRESS NOTE ADULT - PROBLEM SELECTOR PLAN 3
Recent fall. Found to have left ankle fracture s/p closed reduction and splinting by ortho team. Ortho team recommended patient get R knee xrays however patient refused as she was able to ambulate on the RLE. Patient now amenable to R knee XR due to persistent pain.    - ortho consult  - Pain control - prefers tylenol   - NWB LLE in trilaminar splint   - Keep splint clean, dry and intact until follow up  - Patient counseled on possible need for operative intervention  - Ordered right knee XR - EBV+ DLBCL - diagnosed 4/13/21 after presenting to the ED with SOB/CP found to have RML RLL PE and b/l LE DVT along with lung masses up to 8.6cm s/p VATS/biopsy confirming EBV+ DLBCL. s/p chemoport.   - PET/CT on 6/25/21:  Status post wedge resection of left upper lobe lung nodule, as compared to CT dated 4/13/2021. Additional FDG-avid, partially necrotic bilateral lung masses and mildly avid groundglass opacities are not significantly changed as compared to prior CT, compatible with biopsy-proven lymphoma. 2. Few mildly FDG-avid, mildly enlarged mediastinal and bilateral hilar lymph nodes are nonspecific. 3. Previously seen indeterminate3.1 cm lesion in the upper pole of right kidney is FDG-avid.  4. FDG-avid left adrenal nodule is indeterminate.   - Last chemo on 7/22. Next chemo scheduled for 8/12  - no inpatient tx per heme onc

## 2021-08-04 DIAGNOSIS — M25.00 HEMARTHROSIS, UNSPECIFIED JOINT: ICD-10-CM

## 2021-08-04 DIAGNOSIS — K13.79 OTHER LESIONS OF ORAL MUCOSA: ICD-10-CM

## 2021-08-04 DIAGNOSIS — I26.99 OTHER PULMONARY EMBOLISM WITHOUT ACUTE COR PULMONALE: ICD-10-CM

## 2021-08-04 DIAGNOSIS — K21.9 GASTRO-ESOPHAGEAL REFLUX DISEASE WITHOUT ESOPHAGITIS: ICD-10-CM

## 2021-08-04 LAB
ANION GAP SERPL CALC-SCNC: 15 MMOL/L — HIGH (ref 7–14)
BASOPHILS # BLD AUTO: 0.09 K/UL — SIGNIFICANT CHANGE UP (ref 0–0.2)
BASOPHILS NFR BLD AUTO: 2 % — SIGNIFICANT CHANGE UP (ref 0–2)
BUN SERPL-MCNC: 12 MG/DL — SIGNIFICANT CHANGE UP (ref 7–23)
CALCIUM SERPL-MCNC: 8.5 MG/DL — SIGNIFICANT CHANGE UP (ref 8.4–10.5)
CHLORIDE SERPL-SCNC: 108 MMOL/L — HIGH (ref 98–107)
CO2 SERPL-SCNC: 19 MMOL/L — LOW (ref 22–31)
CREAT SERPL-MCNC: 0.59 MG/DL — SIGNIFICANT CHANGE UP (ref 0.5–1.3)
EOSINOPHIL # BLD AUTO: 0.02 K/UL — SIGNIFICANT CHANGE UP (ref 0–0.5)
EOSINOPHIL NFR BLD AUTO: 0.4 % — SIGNIFICANT CHANGE UP (ref 0–6)
GLUCOSE SERPL-MCNC: 80 MG/DL — SIGNIFICANT CHANGE UP (ref 70–99)
HCT VFR BLD CALC: 24.9 % — LOW (ref 34.5–45)
HCT VFR BLD CALC: 25.8 % — LOW (ref 34.5–45)
HGB BLD-MCNC: 8.3 G/DL — LOW (ref 11.5–15.5)
HGB BLD-MCNC: 8.4 G/DL — LOW (ref 11.5–15.5)
IANC: 2.88 K/UL — SIGNIFICANT CHANGE UP (ref 1.5–8.5)
IMM GRANULOCYTES NFR BLD AUTO: 4.7 % — HIGH (ref 0–1.5)
LYMPHOCYTES # BLD AUTO: 0.81 K/UL — LOW (ref 1–3.3)
LYMPHOCYTES # BLD AUTO: 18.2 % — SIGNIFICANT CHANGE UP (ref 13–44)
MAGNESIUM SERPL-MCNC: 1.9 MG/DL — SIGNIFICANT CHANGE UP (ref 1.6–2.6)
MCHC RBC-ENTMCNC: 32.6 GM/DL — SIGNIFICANT CHANGE UP (ref 32–36)
MCHC RBC-ENTMCNC: 33.3 GM/DL — SIGNIFICANT CHANGE UP (ref 32–36)
MCHC RBC-ENTMCNC: 35.9 PG — HIGH (ref 27–34)
MCHC RBC-ENTMCNC: 36.2 PG — HIGH (ref 27–34)
MCV RBC AUTO: 107.8 FL — HIGH (ref 80–100)
MCV RBC AUTO: 111.2 FL — HIGH (ref 80–100)
MONOCYTES # BLD AUTO: 0.44 K/UL — SIGNIFICANT CHANGE UP (ref 0–0.9)
MONOCYTES NFR BLD AUTO: 9.9 % — SIGNIFICANT CHANGE UP (ref 2–14)
NEUTROPHILS # BLD AUTO: 2.88 K/UL — SIGNIFICANT CHANGE UP (ref 1.8–7.4)
NEUTROPHILS NFR BLD AUTO: 64.8 % — SIGNIFICANT CHANGE UP (ref 43–77)
NRBC # BLD: 1 /100 WBCS — SIGNIFICANT CHANGE UP
NRBC # BLD: 1 /100 WBCS — SIGNIFICANT CHANGE UP
NRBC # FLD: 0.06 K/UL — HIGH
NRBC # FLD: 0.07 K/UL — HIGH
PHOSPHATE SERPL-MCNC: 3.1 MG/DL — SIGNIFICANT CHANGE UP (ref 2.5–4.5)
PLATELET # BLD AUTO: 101 K/UL — LOW (ref 150–400)
PLATELET # BLD AUTO: 111 K/UL — LOW (ref 150–400)
POTASSIUM SERPL-MCNC: 3.5 MMOL/L — SIGNIFICANT CHANGE UP (ref 3.5–5.3)
POTASSIUM SERPL-SCNC: 3.5 MMOL/L — SIGNIFICANT CHANGE UP (ref 3.5–5.3)
RBC # BLD: 2.31 M/UL — LOW (ref 3.8–5.2)
RBC # BLD: 2.32 M/UL — LOW (ref 3.8–5.2)
RBC # FLD: 14.8 % — HIGH (ref 10.3–14.5)
RBC # FLD: 15.2 % — HIGH (ref 10.3–14.5)
SODIUM SERPL-SCNC: 142 MMOL/L — SIGNIFICANT CHANGE UP (ref 135–145)
WBC # BLD: 4.45 K/UL — SIGNIFICANT CHANGE UP (ref 3.8–10.5)
WBC # BLD: 5.05 K/UL — SIGNIFICANT CHANGE UP (ref 3.8–10.5)
WBC # FLD AUTO: 4.45 K/UL — SIGNIFICANT CHANGE UP (ref 3.8–10.5)
WBC # FLD AUTO: 5.05 K/UL — SIGNIFICANT CHANGE UP (ref 3.8–10.5)

## 2021-08-04 PROCEDURE — 99233 SBSQ HOSP IP/OBS HIGH 50: CPT | Mod: GC

## 2021-08-04 RX ORDER — PANTOPRAZOLE SODIUM 20 MG/1
40 TABLET, DELAYED RELEASE ORAL DAILY
Refills: 0 | Status: DISCONTINUED | OUTPATIENT
Start: 2021-08-04 | End: 2021-08-06

## 2021-08-04 RX ADMIN — Medication 1 MILLIGRAM(S): at 13:32

## 2021-08-04 RX ADMIN — Medication 1 DROP(S): at 21:47

## 2021-08-04 RX ADMIN — VALACYCLOVIR 1000 MILLIGRAM(S): 500 TABLET, FILM COATED ORAL at 17:08

## 2021-08-04 RX ADMIN — Medication 1 DROP(S): at 13:31

## 2021-08-04 RX ADMIN — PANTOPRAZOLE SODIUM 40 MILLIGRAM(S): 20 TABLET, DELAYED RELEASE ORAL at 14:31

## 2021-08-04 RX ADMIN — Medication 325 MILLIGRAM(S): at 13:32

## 2021-08-04 RX ADMIN — VALACYCLOVIR 1000 MILLIGRAM(S): 500 TABLET, FILM COATED ORAL at 06:40

## 2021-08-04 RX ADMIN — CITALOPRAM 10 MILLIGRAM(S): 10 TABLET, FILM COATED ORAL at 13:31

## 2021-08-04 RX ADMIN — Medication 1 DROP(S): at 06:41

## 2021-08-04 RX ADMIN — Medication 30 MILLILITER(S): at 13:32

## 2021-08-04 RX ADMIN — PREGABALIN 1000 MICROGRAM(S): 225 CAPSULE ORAL at 13:32

## 2021-08-04 RX ADMIN — Medication 3 MILLIGRAM(S): at 21:47

## 2021-08-04 NOTE — PROGRESS NOTE ADULT - ATTENDING COMMENTS
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 Ortho – NWB LLE; no surgical intervention for hemarthrosis. PE - Eliquis on hold until discussion with heme. Pancytopenia - improved Hgb, plt, WBC.  If ok by hematology, Patient medically optimized for discharge.  Discharge planning 40 minutes - discussed with patient and consultants.

## 2021-08-04 NOTE — PROGRESS NOTE ADULT - PROBLEM SELECTOR PLAN 7
- hold apixaban until thrombocytopenia resolved  - SCDs    Discharge planning: Pt rec [place]. h/o GERD. Takes omeprazole at home    - started protonix 40 mg BID

## 2021-08-04 NOTE — PROGRESS NOTE ADULT - PROBLEM SELECTOR PROBLEM 4
Fracture Pulmonary embolism, unspecified chronicity, unspecified pulmonary embolism type, unspecified whether acute cor pulmonale present

## 2021-08-04 NOTE — PROGRESS NOTE ADULT - PROBLEM SELECTOR PLAN 6
- hold apixaban until thrombocytopenia resolved  - SCDs    Discharge planning: Pt rec [facility]. - over last few days, more weakness than usual. difficulty walking, sleeping more than usual.   - has not felt this tired after previous chemo cycles - feels extreme weakness this time.

## 2021-08-04 NOTE — PROGRESS NOTE ADULT - ASSESSMENT
Ms. 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 XR left ankle showed left fibular fracture. S/p 1 platelet transfusion.  Ms. 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 XR left ankle showed left fibular fracture. S/p 1 platelet transfusion, Rt knee hemarthosis.

## 2021-08-04 NOTE — PROGRESS NOTE ADULT - PROBLEM SELECTOR PLAN 3
- EBV+ DLBCL - diagnosed 4/13/21 after presenting to the ED with SOB/CP found to have RML RLL PE and b/l LE DVT along with lung masses up to 8.6cm s/p VATS/biopsy confirming EBV+ DLBCL. s/p chemoport.   - PET/CT on 6/25/21:  Status post wedge resection of left upper lobe lung nodule, as compared to CT dated 4/13/2021. Additional FDG-avid, partially necrotic bilateral lung masses and mildly avid groundglass opacities are not significantly changed as compared to prior CT, compatible with biopsy-proven lymphoma. 2. Few mildly FDG-avid, mildly enlarged mediastinal and bilateral hilar lymph nodes are nonspecific. 3. Previously seen indeterminate3.1 cm lesion in the upper pole of right kidney is FDG-avid.  4. FDG-avid left adrenal nodule is indeterminate.   - Last chemo on 7/22. Next chemo scheduled for 8/12  - no inpatient tx per heme onc

## 2021-08-04 NOTE — PROGRESS NOTE ADULT - PROBLEM SELECTOR PLAN 2
likely 2/2 last chemo treatment on 7/29    - s/p 1U plt transfusion in ED  - CTH without bleeding  - appreciate heme onc recs about restarting eliquis given plt of 67 today  - c/w folate, B12, and iron

## 2021-08-04 NOTE — PROGRESS NOTE ADULT - PROBLEM SELECTOR PLAN 1
i/s/o recent fall. Ortho team recommended patient get R knee xrays due to small effusion on exam c/f mild hemarthrosis however patient refused as she was able to ambulate on the RLE. Patient now agreeable to R knee XR due to persistent pain    -f/u right knee XR Likely traumatic hemarthrosis  - Ortho consult appreciated  - No surgical intervention  - Compressive dressing  - Pain control  - WIll discuss with heme about when would be the appropriate time to resume A/C.

## 2021-08-04 NOTE — PROGRESS NOTE ADULT - PROBLEM SELECTOR PLAN 4
Recent fall. Found to have left ankle fracture s/p closed reduction and splinting by ortho team.    - ortho consult  - Pain control - prefers tylenol   - NWB LLE in trilaminar splint   - Keep splint clean, dry and intact until follow up  - Patient counseled on possible need for operative intervention Eliquis on hold due to hemarthrosis  - will discuss with heme on timing of resuming A/C

## 2021-08-05 ENCOUNTER — APPOINTMENT (OUTPATIENT)
Dept: HEMATOLOGY ONCOLOGY | Facility: CLINIC | Age: 81
End: 2021-08-05

## 2021-08-05 LAB
ANION GAP SERPL CALC-SCNC: 13 MMOL/L — SIGNIFICANT CHANGE UP (ref 7–14)
BUN SERPL-MCNC: 13 MG/DL — SIGNIFICANT CHANGE UP (ref 7–23)
CALCIUM SERPL-MCNC: 8.7 MG/DL — SIGNIFICANT CHANGE UP (ref 8.4–10.5)
CHLORIDE SERPL-SCNC: 109 MMOL/L — HIGH (ref 98–107)
CO2 SERPL-SCNC: 20 MMOL/L — LOW (ref 22–31)
CREAT SERPL-MCNC: 0.56 MG/DL — SIGNIFICANT CHANGE UP (ref 0.5–1.3)
GLUCOSE SERPL-MCNC: 107 MG/DL — HIGH (ref 70–99)
HCT VFR BLD CALC: 27.3 % — LOW (ref 34.5–45)
HGB BLD-MCNC: 9 G/DL — LOW (ref 11.5–15.5)
MAGNESIUM SERPL-MCNC: 2 MG/DL — SIGNIFICANT CHANGE UP (ref 1.6–2.6)
MCHC RBC-ENTMCNC: 33 GM/DL — SIGNIFICANT CHANGE UP (ref 32–36)
MCHC RBC-ENTMCNC: 36.7 PG — HIGH (ref 27–34)
MCV RBC AUTO: 111.4 FL — HIGH (ref 80–100)
NRBC # BLD: 2 /100 WBCS — SIGNIFICANT CHANGE UP
NRBC # FLD: 0.06 K/UL — HIGH
PHOSPHATE SERPL-MCNC: 3.3 MG/DL — SIGNIFICANT CHANGE UP (ref 2.5–4.5)
PLATELET # BLD AUTO: 148 K/UL — LOW (ref 150–400)
POTASSIUM SERPL-MCNC: 3.6 MMOL/L — SIGNIFICANT CHANGE UP (ref 3.5–5.3)
POTASSIUM SERPL-SCNC: 3.6 MMOL/L — SIGNIFICANT CHANGE UP (ref 3.5–5.3)
RBC # BLD: 2.45 M/UL — LOW (ref 3.8–5.2)
RBC # FLD: 15.5 % — HIGH (ref 10.3–14.5)
SODIUM SERPL-SCNC: 142 MMOL/L — SIGNIFICANT CHANGE UP (ref 135–145)
WBC # BLD: 4.13 K/UL — SIGNIFICANT CHANGE UP (ref 3.8–10.5)
WBC # FLD AUTO: 4.13 K/UL — SIGNIFICANT CHANGE UP (ref 3.8–10.5)

## 2021-08-05 PROCEDURE — 99233 SBSQ HOSP IP/OBS HIGH 50: CPT | Mod: GC

## 2021-08-05 RX ORDER — APIXABAN 2.5 MG/1
5 TABLET, FILM COATED ORAL EVERY 12 HOURS
Refills: 0 | Status: DISCONTINUED | OUTPATIENT
Start: 2021-08-05 | End: 2021-08-06

## 2021-08-05 RX ADMIN — VALACYCLOVIR 1000 MILLIGRAM(S): 500 TABLET, FILM COATED ORAL at 18:05

## 2021-08-05 RX ADMIN — Medication 1 DROP(S): at 05:37

## 2021-08-05 RX ADMIN — CITALOPRAM 10 MILLIGRAM(S): 10 TABLET, FILM COATED ORAL at 13:11

## 2021-08-05 RX ADMIN — Medication 1 DROP(S): at 21:12

## 2021-08-05 RX ADMIN — APIXABAN 5 MILLIGRAM(S): 2.5 TABLET, FILM COATED ORAL at 21:12

## 2021-08-05 RX ADMIN — Medication 1 DROP(S): at 13:09

## 2021-08-05 RX ADMIN — Medication 30 MILLILITER(S): at 13:09

## 2021-08-05 RX ADMIN — Medication 325 MILLIGRAM(S): at 13:11

## 2021-08-05 RX ADMIN — PREGABALIN 1000 MICROGRAM(S): 225 CAPSULE ORAL at 13:10

## 2021-08-05 RX ADMIN — Medication 3 MILLIGRAM(S): at 21:12

## 2021-08-05 RX ADMIN — VALACYCLOVIR 1000 MILLIGRAM(S): 500 TABLET, FILM COATED ORAL at 05:37

## 2021-08-05 RX ADMIN — PANTOPRAZOLE SODIUM 40 MILLIGRAM(S): 20 TABLET, DELAYED RELEASE ORAL at 13:11

## 2021-08-05 RX ADMIN — Medication 1 MILLIGRAM(S): at 13:10

## 2021-08-05 NOTE — PROGRESS NOTE ADULT - PROBLEM SELECTOR PLAN 1
Likely traumatic hemarthrosis  - Ortho consult appreciated  - No surgical intervention  - Compressive dressing  - Pain control  - WIll discuss with heme about when would be the appropriate time to resume A/C. Likely traumatic hemarthrosis  - No surgical intervention per ortho  - Compressive dressing  - Pain control - tylenol   - WIll discuss with heme about when would be the appropriate time to resume A/C.

## 2021-08-05 NOTE — PROGRESS NOTE ADULT - ATTENDING COMMENTS
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 Ortho – NWB LLE; no surgical intervention for hemarthrosis; Eliquis on hold until discussion with heme and ortho about need for diagnostic arthrocentesis for hemarthrosis. Pancytopenia - improved Hgb, plt, WBC.

## 2021-08-05 NOTE — PROGRESS NOTE ADULT - SUBJECTIVE AND OBJECTIVE BOX
PROGRESS NOTE:   Authored by Dr. Phyllis Jorgensen MD (PGY-1). Pager [facility] [phone]/ [facility]     Patient is a 80y old  Female who presents with a chief complaint of fall, thrombocytopenia, ankle fracture (04 Aug 2021 06:44)      SUBJECTIVE / OVERNIGHT EVENTS:  No acute events overnight.     ADDITIONAL REVIEW OF SYSTEMS:  Patient denies fevers, chills, chest pain, shortness of breath, nausea, abdominal pain, diarrhea, constipation, dysuria, leg swelling, headache, light headedness.    MEDICATIONS  (STANDING):  artificial tears (preservative free) Ophthalmic Solution 1 Drop(s) Both EYES three times a day  citalopram 10 milliGRAM(s) Oral daily  cyanocobalamin 1000 MICROGram(s) Oral daily  ferrous    sulfate 325 milliGRAM(s) Oral daily  FIRST- Mouthwash  BLM 10 milliLiter(s) Swish and Spit three times a day  folic acid 1 milliGRAM(s) Oral daily  melatonin 3 milliGRAM(s) Oral at bedtime  nystatin    Suspension 968270 Unit(s) Swish and Swallow four times a day  pantoprazole    Tablet 40 milliGRAM(s) Oral daily  Saliva Substitute (CAPHOSOL) 30 milliLiter(s) Swish and Spit daily  valACYclovir 1000 milliGRAM(s) Oral two times a day    MEDICATIONS  (PRN):  acetaminophen    Suspension .. 660 milliGRAM(s) Oral every 6 hours PRN Mild Pain (1 - 3), Moderate Pain (4 - 6)  metoclopramide 5 milliGRAM(s) Oral every 8 hours PRN nausea/vomiting      CAPILLARY BLOOD GLUCOSE        I&O's Summary      PHYSICAL EXAM:  Vital Signs Last 24 Hrs  T(C): 36.4 (05 Aug 2021 05:40), Max: 36.9 (04 Aug 2021 20:00)  T(F): 97.6 (05 Aug 2021 05:40), Max: 98.4 (04 Aug 2021 20:00)  HR: 75 (05 Aug 2021 05:40) (75 - 89)  BP: 138/64 (05 Aug 2021 05:40) (131/67 - 142/60)  BP(mean): --  RR: 17 (05 Aug 2021 05:40) (17 - 18)  SpO2: 97% (05 Aug 2021 05:40) (97% - 100%)    CONSTITUTIONAL: NAD, well-developed  HEET: MMM, EOMI, PERRLA  NECK: supple  RESPIRATORY: Normal respiratory effort; lungs are clear to auscultation bilaterally  CARDIOVASCULAR: Regular rate and rhythm, normal S1 and S2, no murmur/rub/gallop; No lower extremity edema; Peripheral pulses are 2+ bilaterally  ABDOMEN: Nontender to palpation, normoactive bowel sounds, no rebound/guarding; No hepatosplenomegaly  MUSCULOSKELETAL: no clubbing or cyanosis of digits; no joint swelling or tenderness to palpation  NEURO: Moving all four extremities, sensation grossly intact  PSYCH: A+O to person, place, and time; affect appropriate  SKIN: No rash    LABS:                        8.3    5.05  )-----------( 111      ( 04 Aug 2021 11:02 )             24.9     08-04    142  |  108<H>  |  12  ----------------------------<  80  3.5   |  19<L>  |  0.59    Ca    8.5      04 Aug 2021 08:07  Phos  3.1     08-04  Mg     1.90     08-04                  Tele Reviewed:    RADIOLOGY & ADDITIONAL TESTS:  Results Reviewed:   Imaging Personally Reviewed:  Electrocardiogram Personally Reviewed:     PROGRESS NOTE:   Authored by Dr. Phyllis Jorgensen MD (PGY-1). Pager [facility] [phone]/ [facility]     Patient is a 80y old  Female who presents with a chief complaint of fall, thrombocytopenia, ankle fracture (04 Aug 2021 06:44)      SUBJECTIVE / OVERNIGHT EVENTS:  No acute events overnight.     Patient says weakness and right knee pain are better. She reports mouth sores have improved with caphasol. Endorses poor appetite.     ADDITIONAL REVIEW OF SYSTEMS:  Patient denies fevers, chills, chest pain, shortness of breath, nausea, abdominal pain, diarrhea, constipation, dysuria, leg swelling, headache, light headedness.    MEDICATIONS  (STANDING):  artificial tears (preservative free) Ophthalmic Solution 1 Drop(s) Both EYES three times a day  citalopram 10 milliGRAM(s) Oral daily  cyanocobalamin 1000 MICROGram(s) Oral daily  ferrous    sulfate 325 milliGRAM(s) Oral daily  FIRST- Mouthwash  BLM 10 milliLiter(s) Swish and Spit three times a day  folic acid 1 milliGRAM(s) Oral daily  melatonin 3 milliGRAM(s) Oral at bedtime  nystatin    Suspension 158994 Unit(s) Swish and Swallow four times a day  pantoprazole    Tablet 40 milliGRAM(s) Oral daily  Saliva Substitute (CAPHOSOL) 30 milliLiter(s) Swish and Spit daily  valACYclovir 1000 milliGRAM(s) Oral two times a day    MEDICATIONS  (PRN):  acetaminophen    Suspension .. 660 milliGRAM(s) Oral every 6 hours PRN Mild Pain (1 - 3), Moderate Pain (4 - 6)  metoclopramide 5 milliGRAM(s) Oral every 8 hours PRN nausea/vomiting      CAPILLARY BLOOD GLUCOSE        I&O's Summary      PHYSICAL EXAM:  Vital Signs Last 24 Hrs  T(C): 36.4 (05 Aug 2021 05:40), Max: 36.9 (04 Aug 2021 20:00)  T(F): 97.6 (05 Aug 2021 05:40), Max: 98.4 (04 Aug 2021 20:00)  HR: 75 (05 Aug 2021 05:40) (75 - 89)  BP: 138/64 (05 Aug 2021 05:40) (131/67 - 142/60)  BP(mean): --  RR: 17 (05 Aug 2021 05:40) (17 - 18)  SpO2: 97% (05 Aug 2021 05:40) (97% - 100%)    CONSTITUTIONAL: NAD, well-developed  HEET: MMM, EOMI, PERRLA  NECK: supple  RESPIRATORY: Normal respiratory effort; lungs are clear to auscultation bilaterally  CARDIOVASCULAR: Regular rate and rhythm, normal S1 and S2, no murmur/rub/gallop; No lower extremity edema; Peripheral pulses are 2+ bilaterally  ABDOMEN: Nontender to palpation, normoactive bowel sounds, no rebound/guarding; No hepatosplenomegaly  MUSCULOSKELETAL: no clubbing or cyanosis of digits; no joint swelling or tenderness to palpation, LLE splinted and wrapped, right knee with ACE wrap, mild tenderness to palpation of medial aspectof right knee  NEURO: Moving all four extremities, sensation grossly intact  PSYCH: A+O to person, place, and time; affect appropriate  SKIN: No rash    LABS:                        8.3    5.05  )-----------( 111      ( 04 Aug 2021 11:02 )             24.9     08-04    142  |  108<H>  |  12  ----------------------------<  80  3.5   |  19<L>  |  0.59    Ca    8.5      04 Aug 2021 08:07  Phos  3.1     08-04  Mg     1.90     08-04                  Tele Reviewed:    RADIOLOGY & ADDITIONAL TESTS:  Results Reviewed:   Imaging Personally Reviewed:  Electrocardiogram Personally Reviewed:

## 2021-08-05 NOTE — PROGRESS NOTE ADULT - ASSESSMENT
Ms. 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 XR left ankle showed left fibular fracture. S/p 1 platelet transfusion, Rt knee hemarthosis.

## 2021-08-05 NOTE — PROGRESS NOTE ADULT - PROBLEM SELECTOR PLAN 5
Recent fall. Found to have left ankle fracture s/p closed reduction and splinting by ortho team.    - ortho consult  - Pain control - prefers tylenol   - NWB LLE in trilaminar splint   - Keep splint clean, dry and intact until follow up  - Patient counseled on possible need for operative intervention

## 2021-08-05 NOTE — PROGRESS NOTE ADULT - PROBLEM SELECTOR PLAN 2
likely 2/2 last chemo treatment on 7/29    - s/p 1U plt transfusion in ED  - CTH without bleeding  - appreciate heme onc recs about restarting eliquis given plt of 67 today  - c/w folate, B12, and iron likely 2/2 last chemo treatment on 7/29    - s/p 1U plt transfusion in ED  - CTH without bleeding  - appreciate heme onc recs about restarting eliquis   - c/w folate, B12, and iron

## 2021-08-05 NOTE — CHART NOTE - NSCHARTNOTEFT_GEN_A_CORE
Hematology Fellow Chart Note    Discussed case with  and with primary team.    Patient will continue to require chemotherapy for hx of DLBCL. She requires chemotherapy every 3 weeks; next dose is planned for 8/19/21; subsequent cycles to be decided by her hematologist, Dr. Madrigal. Treatment appointments to will require transportation to the treatment room to the [facility] to be arranged once she is in [place].    Please call with any questions about plan.    Lit Rahman MD, MPH  Hematology / Oncology Fellow, PGY7  p

## 2021-08-06 ENCOUNTER — TRANSCRIPTION ENCOUNTER (OUTPATIENT)
Age: 81
End: 2021-08-06

## 2021-08-06 VITALS
HEART RATE: 88 BPM | RESPIRATION RATE: 17 BRPM | OXYGEN SATURATION: 100 % | SYSTOLIC BLOOD PRESSURE: 124 MMHG | TEMPERATURE: 98 F | DIASTOLIC BLOOD PRESSURE: 62 MMHG

## 2021-08-06 LAB
ANION GAP SERPL CALC-SCNC: 12 MMOL/L — SIGNIFICANT CHANGE UP (ref 7–14)
BASOPHILS # BLD AUTO: 0.04 K/UL — SIGNIFICANT CHANGE UP (ref 0–0.2)
BASOPHILS NFR BLD AUTO: 0.8 % — SIGNIFICANT CHANGE UP (ref 0–2)
BUN SERPL-MCNC: 15 MG/DL — SIGNIFICANT CHANGE UP (ref 7–23)
CALCIUM SERPL-MCNC: 8.7 MG/DL — SIGNIFICANT CHANGE UP (ref 8.4–10.5)
CHLORIDE SERPL-SCNC: 111 MMOL/L — HIGH (ref 98–107)
CO2 SERPL-SCNC: 20 MMOL/L — LOW (ref 22–31)
CREAT SERPL-MCNC: 0.62 MG/DL — SIGNIFICANT CHANGE UP (ref 0.5–1.3)
EOSINOPHIL # BLD AUTO: 0.02 K/UL — SIGNIFICANT CHANGE UP (ref 0–0.5)
EOSINOPHIL NFR BLD AUTO: 0.4 % — SIGNIFICANT CHANGE UP (ref 0–6)
GLUCOSE SERPL-MCNC: 91 MG/DL — SIGNIFICANT CHANGE UP (ref 70–99)
HCT VFR BLD CALC: 24.8 % — LOW (ref 34.5–45)
HGB BLD-MCNC: 8 G/DL — LOW (ref 11.5–15.5)
IANC: 3.35 K/UL — SIGNIFICANT CHANGE UP (ref 1.5–8.5)
IMM GRANULOCYTES NFR BLD AUTO: 5.9 % — HIGH (ref 0–1.5)
LYMPHOCYTES # BLD AUTO: 0.75 K/UL — LOW (ref 1–3.3)
LYMPHOCYTES # BLD AUTO: 14.3 % — SIGNIFICANT CHANGE UP (ref 13–44)
MAGNESIUM SERPL-MCNC: 2 MG/DL — SIGNIFICANT CHANGE UP (ref 1.6–2.6)
MCHC RBC-ENTMCNC: 32.3 GM/DL — SIGNIFICANT CHANGE UP (ref 32–36)
MCHC RBC-ENTMCNC: 35.9 PG — HIGH (ref 27–34)
MCV RBC AUTO: 111.2 FL — HIGH (ref 80–100)
MONOCYTES # BLD AUTO: 0.79 K/UL — SIGNIFICANT CHANGE UP (ref 0–0.9)
MONOCYTES NFR BLD AUTO: 15 % — HIGH (ref 2–14)
NEUTROPHILS # BLD AUTO: 3.35 K/UL — SIGNIFICANT CHANGE UP (ref 1.8–7.4)
NEUTROPHILS NFR BLD AUTO: 63.6 % — SIGNIFICANT CHANGE UP (ref 43–77)
NRBC # BLD: 1 /100 WBCS — SIGNIFICANT CHANGE UP
NRBC # FLD: 0.07 K/UL — HIGH
PHOSPHATE SERPL-MCNC: 3.8 MG/DL — SIGNIFICANT CHANGE UP (ref 2.5–4.5)
PLATELET # BLD AUTO: 161 K/UL — SIGNIFICANT CHANGE UP (ref 150–400)
POTASSIUM SERPL-MCNC: 3.7 MMOL/L — SIGNIFICANT CHANGE UP (ref 3.5–5.3)
POTASSIUM SERPL-SCNC: 3.7 MMOL/L — SIGNIFICANT CHANGE UP (ref 3.5–5.3)
RBC # BLD: 2.23 M/UL — LOW (ref 3.8–5.2)
RBC # FLD: 16.2 % — HIGH (ref 10.3–14.5)
SODIUM SERPL-SCNC: 143 MMOL/L — SIGNIFICANT CHANGE UP (ref 135–145)
WBC # BLD: 5.26 K/UL — SIGNIFICANT CHANGE UP (ref 3.8–10.5)
WBC # FLD AUTO: 5.26 K/UL — SIGNIFICANT CHANGE UP (ref 3.8–10.5)

## 2021-08-06 PROCEDURE — 99239 HOSP IP/OBS DSCHRG MGMT >30: CPT

## 2021-08-06 RX ORDER — NYSTATIN 500MM UNIT
5 POWDER (EA) MISCELLANEOUS
Qty: 0 | Refills: 0 | DISCHARGE
Start: 2021-08-06

## 2021-08-06 RX ORDER — FERROUS SULFATE 325(65) MG
1 TABLET ORAL
Qty: 0 | Refills: 0 | DISCHARGE
Start: 2021-08-06

## 2021-08-06 RX ORDER — DIPHENHYDRAMINE HYDROCHLORIDE AND LIDOCAINE HYDROCHLORIDE AND ALUMINUM HYDROXIDE AND MAGNESIUM HYDRO
10 KIT
Qty: 0 | Refills: 0 | DISCHARGE
Start: 2021-08-06

## 2021-08-06 RX ORDER — SALIVA SUBSTITUTE COMB NO.11 351 MG
30 POWDER IN PACKET (EA) MUCOUS MEMBRANE
Qty: 0 | Refills: 0 | DISCHARGE
Start: 2021-08-06

## 2021-08-06 RX ORDER — PREGABALIN 225 MG/1
1 CAPSULE ORAL
Qty: 0 | Refills: 0 | DISCHARGE
Start: 2021-08-06

## 2021-08-06 RX ORDER — FOLIC ACID 0.8 MG
1 TABLET ORAL
Qty: 0 | Refills: 0 | DISCHARGE
Start: 2021-08-06

## 2021-08-06 RX ORDER — ACETAMINOPHEN 500 MG
20.63 TABLET ORAL
Qty: 0 | Refills: 0 | DISCHARGE
Start: 2021-08-06

## 2021-08-06 RX ADMIN — Medication 1 DROP(S): at 13:42

## 2021-08-06 RX ADMIN — Medication 30 MILLILITER(S): at 13:42

## 2021-08-06 RX ADMIN — Medication 325 MILLIGRAM(S): at 13:41

## 2021-08-06 RX ADMIN — PREGABALIN 1000 MICROGRAM(S): 225 CAPSULE ORAL at 13:41

## 2021-08-06 RX ADMIN — VALACYCLOVIR 1000 MILLIGRAM(S): 500 TABLET, FILM COATED ORAL at 05:58

## 2021-08-06 RX ADMIN — APIXABAN 5 MILLIGRAM(S): 2.5 TABLET, FILM COATED ORAL at 06:00

## 2021-08-06 RX ADMIN — CITALOPRAM 10 MILLIGRAM(S): 10 TABLET, FILM COATED ORAL at 13:41

## 2021-08-06 RX ADMIN — Medication 1 DROP(S): at 05:58

## 2021-08-06 RX ADMIN — Medication 1 MILLIGRAM(S): at 13:41

## 2021-08-06 RX ADMIN — PANTOPRAZOLE SODIUM 40 MILLIGRAM(S): 20 TABLET, DELAYED RELEASE ORAL at 13:54

## 2021-08-06 NOTE — DISCHARGE NOTE PROVIDER - CARE PROVIDERS DIRECT ADDRESSES
,[email or web address]

## 2021-08-06 NOTE — PROGRESS NOTE ADULT - PROBLEM SELECTOR PLAN 5
Recent fall. Found to have left ankle fracture s/p closed reduction and splinting by ortho team.    - ortho consult  - Pain control - prefers tylenol   - NWB LLE in trilaminar splint   - Keep splint clean, dry and intact until follow up  - Patient counseled on possible need for operative intervention Recent fall. Found to have left ankle fracture s/p closed reduction and splinting by ortho team.    - ortho consult  - Pain control - prefers tylenol   - NWB LLE in trilaminar splint   - Keep splint clean, dry and intact until follow up

## 2021-08-06 NOTE — PROGRESS NOTE ADULT - ASSESSMENT
Ms. 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 Course c/b left ankle fracture, right knee hemarthrosis presenting from outpt onc clinic for weakness, thrombocytopenia, and fall. XR left ankle showed left fibular fracture. S/p 1 platelet transfusion, Rt knee hemarthosis. Ms. 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 Course c/b left ankle fracture, right knee hemarthrosis

## 2021-08-06 NOTE — PROGRESS NOTE ADULT - PROBLEM SELECTOR PLAN 3
- EBV+ DLBCL - diagnosed 4/13/21 after presenting to the ED with SOB/CP found to have RML RLL PE and b/l LE DVT along with lung masses up to 8.6cm s/p VATS/biopsy confirming EBV+ DLBCL. s/p chemoport.   - PET/CT on 6/25/21:  Status post wedge resection of left upper lobe lung nodule, as compared to CT dated 4/13/2021. Additional FDG-avid, partially necrotic bilateral lung masses and mildly avid groundglass opacities are not significantly changed as compared to prior CT, compatible with biopsy-proven lymphoma. 2. Few mildly FDG-avid, mildly enlarged mediastinal and bilateral hilar lymph nodes are nonspecific. 3. Previously seen indeterminate3.1 cm lesion in the upper pole of right kidney is FDG-avid.  4. FDG-avid left adrenal nodule is indeterminate.   - Last chemo on 7/22. Next chemo scheduled for 8/12  - no inpatient tx per heme onc DLBCL - diagnosed 4/21, with lung involvement s/p R VATS    - no inpatient tx per heme onc  - Last chemo on 7/22. Next chemo scheduled for 8/19

## 2021-08-06 NOTE — DISCHARGE NOTE PROVIDER - NSDCCPCAREPLAN_GEN_ALL_CORE_FT
PRINCIPAL DISCHARGE DIAGNOSIS  Diagnosis: Weakness  Assessment and Plan of Treatment: You came to the hospital due to extreme weakness. Your weakness was due to your low red blood cell count, low platelet count, and low white blood cell count. This was caused by your recent chemotherapy. You received 1 unit of platelets. You were also given B12, folate, and iron for your low red blood cell count. You were seen by hematology and oncology who determined that you did not need inpatient treatment for your lymphoma. Your eliquis was held while your platelet count was low due to an increased risk of bleeding. Eliquis was resumed once your platelet count improved and once the orthpoedic team determined you did not need any surgical intervention for your left ankle fracture and right knee swelling. Please follow up outpatient with your oncologist. Your next chemo appointment is scheduled for 8/19 at 8:30 AM.        SECONDARY DISCHARGE DIAGNOSES  Diagnosis: Ankle fracture, left  Assessment and Plan of Treatment: An x-ray of your left ankle was done and showed a left ankle fracture. Orthopedic surgery saw you and determined there was no need for surgical intervention and splinted your left ankle. You should not bear weight on your left foot. You should keep the spint clean, dry and intact until your follow up appointment with orthopedic surgery. Please follow up with orthopedic surgeon Dr. Greene in 1 week. Call [phone] to make an appointment.    Diagnosis: Hemarthrosis, right knee  Assessment and Plan of Treatment: You came in with right knee swelling after a fall. A compressive dressing was applied to your right knee due to hemarthrosis or bleeding into the joint caused by your fall. The orthopedic surgeon determined there was no need to drain the knee. Please follow up with orthopedic surgeon Dr. Greene in 1 week     PRINCIPAL DISCHARGE DIAGNOSIS  Diagnosis: Weakness  Assessment and Plan of Treatment: You came to the hospital due to extreme weakness. Your weakness was due to your low red blood cell count, low platelet count, and low white blood cell count. This was caused by your recent chemotherapy. You received 1 unit of platelets. You were also given B12, folate, and iron for your low red blood cell count. You were seen by hematology and oncology who determined that you did not need inpatient treatment for your lymphoma. Your eliquis was held while your platelet count was low due to an increased risk of bleeding. Eliquis was resumed once your platelet count improved and once the orthpoedic team determined you did not need any surgical intervention for your left ankle fracture and right knee swelling. Continue to take your Eliquis after discharge. Please follow up outpatient with your oncologist. Your next chemo appointment is scheduled for 8/19 at 8:30 AM.        SECONDARY DISCHARGE DIAGNOSES  Diagnosis: Ankle fracture, left  Assessment and Plan of Treatment: An x-ray of your left ankle was done and showed a left ankle fracture. Orthopedic surgery saw you and determined there was no need for surgical intervention and splinted your left ankle. You should not bear weight on your left foot. You should keep the spint clean, dry and intact until your follow up appointment with orthopedic surgery. Please follow up with orthopedic surgeon Dr. Greene in 1 week. Call [phone] to make an appointment.    Diagnosis: Hemarthrosis, right knee  Assessment and Plan of Treatment: You came in with right knee swelling after a fall. A compressive dressing was applied to your right knee due to hemarthrosis or bleeding into the joint caused by your fall. The orthopedic surgeon determined there was no need to drain the knee. Please follow up with orthopedic surgeon Dr. Greene in 1 week     PRINCIPAL DISCHARGE DIAGNOSIS  Diagnosis: Weakness  Assessment and Plan of Treatment: You came to the hospital due to extreme weakness. Your weakness was due to your low red blood cell count, low platelet count, and low white blood cell count. This was caused by your recent chemotherapy. You received 1 unit of platelets. You were also given B12, folate, and iron for your low red blood cell count. You were seen by hematology and oncology who determined that you did not need inpatient treatment for your lymphoma. Your eliquis was held while your platelet count was low due to an increased risk of bleeding. Eliquis was resumed once your platelet count improved and once the orthpoedic team determined you did not need any surgical intervention for your left ankle fracture and right knee swelling. Continue to take your Eliquis after discharge. Please follow up outpatient with your oncologist. Your next chemo appointment is scheduled for 8/19 at 8:30 AM.        SECONDARY DISCHARGE DIAGNOSES  Diagnosis: Ankle fracture, left  Assessment and Plan of Treatment: An x-ray of your left ankle was done and showed a left ankle fracture. Orthopedic surgery saw you and determined there was no need for surgical intervention and splinted your left ankle. You should not bear weight on your left foot. You should keep the spint clean, dry and intact until your follow up appointment with orthopedic surgery. Please follow up with orthopedic surgeon Dr. Greene in 1 week. Call [phone] to make an appointment.    Diagnosis: Hemarthrosis, right knee  Assessment and Plan of Treatment: You came in with right knee swelling after a fall. A compressive dressing was applied to your right knee due to hemarthrosis or bleeding into the joint caused by your fall. The orthopedic surgeon determined there was no need to drain the knee. Please follow up with orthopedic surgeon Dr. Greene in 1 week  - Weight bearing as tolerated right lower extremity with assistive devices as needed  - May apply compressive wrap to knee  - Ice/elevate affected extremity  - Non weight bearing Left lower extremity in splint  Please monitor for increased swelling & seek medical attention/come back to the hospital.

## 2021-08-06 NOTE — DISCHARGE NOTE PROVIDER - NSDCMRMEDTOKEN_GEN_ALL_CORE_FT
acetaminophen 160 mg/5 mL oral suspension: 20.63 milliliter(s) orally every 6 hours, As needed, Mild Pain (1 - 3), Moderate Pain (4 - 6)  citalopram 10 mg oral tablet: 1 tab(s) orally once a day  cyanocobalamin 1000 mcg oral tablet: 1 tab(s) orally once a day  diphenhydramine/lidocaine/aluminum hydroxide/magnesium hydroxide/simethicone mucous membrane suspension: 10 milliliter(s) mucous membrane 3 times a day, As Needed Mouth pain  Eliquis 5 mg oral tablet: 1 tab(s) orally 2 times a day MDD:2 tabs  ezetimibe 10 mg oral tablet: 1 tab(s) orally once a day  ferrous sulfate 325 mg (65 mg elemental iron) oral tablet: 1 tab(s) orally once a day  folic acid 1 mg oral tablet: 1 tab(s) orally once a day  nystatin 100,000 units/mL oral suspension: 5 milliliter(s) orally 4 times a day, As Needed for Thrush  ocular lubricant ophthalmic solution: 1 drop(s) to each affected eye 3 times a day  omeprazole 20 mg oral delayed release capsule: 1 cap(s) orally once a day  Reglan 10 mg oral tablet: 1 tab(s) orally every 8 hours, As Needed for nausea/vomiting  saliva substitutes oral solution: 30 milliliter(s) orally once a day, As Needed mouth pain  valACYclovir 1 g oral tablet: 1 tab(s) orally 2 times a day

## 2021-08-06 NOTE — PROGRESS NOTE ADULT - ATTENDING COMMENTS
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   Fracture and hemarthrosis - Ortho – NWB LLE; no surgical intervention for hemarthrosis.  PE - Resume Eliquis.  Pancytopenia - improved Hgb, plt, WBC.  Patient medically optimized for discharge.  Discharge planning 40 minutes - discussed with patient and consultants.

## 2021-08-06 NOTE — PROGRESS NOTE ADULT - PROBLEM SELECTOR PLAN 9
- hold apixaban until thrombocytopenia resolved  - SCDs    Discharge planning: Pt rec [place]. - SCDs    Discharge planning: Pt rec [place]. Patient medically optimized for discharge

## 2021-08-06 NOTE — PROGRESS NOTE ADULT - SUBJECTIVE AND OBJECTIVE BOX
PROGRESS NOTE:   Authored by Dr. Phyllis Jorgensen MD (PGY-1). Pager [facility] [phone]/ [facility]     Patient is a 80y old  Female who presents with a chief complaint of fall, thrombocytopenia, ankle fracture (06 Aug 2021 12:57)      SUBJECTIVE / OVERNIGHT EVENTS:  No acute events overnight.     ADDITIONAL REVIEW OF SYSTEMS:  Patient denies fevers, chills, chest pain, shortness of breath, nausea, abdominal pain, diarrhea, constipation, dysuria, leg swelling, headache, light headedness.    MEDICATIONS  (STANDING):  apixaban 5 milliGRAM(s) Oral every 12 hours  artificial tears (preservative free) Ophthalmic Solution 1 Drop(s) Both EYES three times a day  citalopram 10 milliGRAM(s) Oral daily  cyanocobalamin 1000 MICROGram(s) Oral daily  ferrous    sulfate 325 milliGRAM(s) Oral daily  FIRST- Mouthwash  BLM 10 milliLiter(s) Swish and Spit three times a day  folic acid 1 milliGRAM(s) Oral daily  melatonin 3 milliGRAM(s) Oral at bedtime  nystatin    Suspension 754198 Unit(s) Swish and Swallow four times a day  pantoprazole    Tablet 40 milliGRAM(s) Oral daily  Saliva Substitute (CAPHOSOL) 30 milliLiter(s) Swish and Spit daily  valACYclovir 1000 milliGRAM(s) Oral two times a day    MEDICATIONS  (PRN):  acetaminophen    Suspension .. 660 milliGRAM(s) Oral every 6 hours PRN Mild Pain (1 - 3), Moderate Pain (4 - 6)  metoclopramide 5 milliGRAM(s) Oral every 8 hours PRN nausea/vomiting      CAPILLARY BLOOD GLUCOSE        I&O's Summary      PHYSICAL EXAM:  Vital Signs Last 24 Hrs  T(C): 36.7 (06 Aug 2021 14:06), Max: 37 (06 Aug 2021 06:08)  T(F): 98 (06 Aug 2021 14:06), Max: 98.6 (06 Aug 2021 06:08)  HR: 88 (06 Aug 2021 14:06) (75 - 134)  BP: 124/62 (06 Aug 2021 14:06) (116/49 - 146/48)  BP(mean): --  RR: 17 (06 Aug 2021 14:06) (17 - 19)  SpO2: 100% (06 Aug 2021 14:06) (98% - 100%)    CONSTITUTIONAL: NAD, well-developed  HEET: MMM, EOMI, PERRLA  NECK: supple  RESPIRATORY: Normal respiratory effort; lungs are clear to auscultation bilaterally  CARDIOVASCULAR: Regular rate and rhythm, normal S1 and S2, no murmur/rub/gallop; No lower extremity edema; Peripheral pulses are 2+ bilaterally  ABDOMEN: Nontender to palpation, normoactive bowel sounds, no rebound/guarding; No hepatosplenomegaly  MUSCULOSKELETAL: no clubbing or cyanosis of digits; no joint swelling or tenderness to palpation  NEURO: Moving all four extremities, sensation grossly intact  PSYCH: A+O to person, place, and time; affect appropriate  SKIN: No rash    LABS:                        8.0    5.26  )-----------( 161      ( 06 Aug 2021 06:58 )             24.8     08-06    143  |  111<H>  |  15  ----------------------------<  91  3.7   |  20<L>  |  0.62    Ca    8.7      06 Aug 2021 06:58  Phos  3.8     08-06  Mg     2.00     08-06                  Tele Reviewed:    RADIOLOGY & ADDITIONAL TESTS:  Results Reviewed:   Imaging Personally Reviewed:  Electrocardiogram Personally Reviewed:     PROGRESS NOTE:   Authored by Dr. Phyllis Jorgensen MD (PGY-1). Pager [facility] [phone]/ [facility]     Patient is a 80y old  Female who presents with a chief complaint of fall, thrombocytopenia, ankle fracture (06 Aug 2021 12:57)      SUBJECTIVE / OVERNIGHT EVENTS:  Eliquis resumed last night. No signs of bleeding.    Weakness has improved. Patient says she was able to bear weight on right leg yesterday without any pain. Denies CP, SOB, dysuria.     ADDITIONAL REVIEW OF SYSTEMS:  Patient denies fevers, chills, chest pain, shortness of breath, nausea, abdominal pain, diarrhea, constipation, dysuria, leg swelling, headache, light headedness.    MEDICATIONS  (STANDING):  apixaban 5 milliGRAM(s) Oral every 12 hours  artificial tears (preservative free) Ophthalmic Solution 1 Drop(s) Both EYES three times a day  citalopram 10 milliGRAM(s) Oral daily  cyanocobalamin 1000 MICROGram(s) Oral daily  ferrous    sulfate 325 milliGRAM(s) Oral daily  FIRST- Mouthwash  BLM 10 milliLiter(s) Swish and Spit three times a day  folic acid 1 milliGRAM(s) Oral daily  melatonin 3 milliGRAM(s) Oral at bedtime  nystatin    Suspension 974229 Unit(s) Swish and Swallow four times a day  pantoprazole    Tablet 40 milliGRAM(s) Oral daily  Saliva Substitute (CAPHOSOL) 30 milliLiter(s) Swish and Spit daily  valACYclovir 1000 milliGRAM(s) Oral two times a day    MEDICATIONS  (PRN):  acetaminophen    Suspension .. 660 milliGRAM(s) Oral every 6 hours PRN Mild Pain (1 - 3), Moderate Pain (4 - 6)  metoclopramide 5 milliGRAM(s) Oral every 8 hours PRN nausea/vomiting      CAPILLARY BLOOD GLUCOSE        I&O's Summary      PHYSICAL EXAM:  Vital Signs Last 24 Hrs  T(C): 36.7 (06 Aug 2021 14:06), Max: 37 (06 Aug 2021 06:08)  T(F): 98 (06 Aug 2021 14:06), Max: 98.6 (06 Aug 2021 06:08)  HR: 88 (06 Aug 2021 14:06) (75 - 134)  BP: 124/62 (06 Aug 2021 14:06) (116/49 - 146/48)  BP(mean): --  RR: 17 (06 Aug 2021 14:06) (17 - 19)  SpO2: 100% (06 Aug 2021 14:06) (98% - 100%)    CONSTITUTIONAL: NAD, well-developed  HEET: MMM, EOMI, PERRLA  NECK: supple  RESPIRATORY: Normal respiratory effort; lungs are clear to auscultation bilaterally  CARDIOVASCULAR: Regular rate and rhythm, normal S1 and S2, no murmur/rub/gallop; No lower extremity edema; Peripheral pulses are 2+ bilaterally  ABDOMEN: Nontender to palpation, normoactive bowel sounds, no rebound/guarding; No hepatosplenomegaly  MUSCULOSKELETAL: no clubbing or cyanosis of digits; no joint swelling or tenderness to palpation, splint on LLE, compression dressing on right knee  NEURO: Moving all four extremities, sensation grossly intact  PSYCH: A+O to person, place, and time; affect appropriate  SKIN: No rash    LABS:                        8.0    5.26  )-----------( 161      ( 06 Aug 2021 06:58 )             24.8     08-06    143  |  111<H>  |  15  ----------------------------<  91  3.7   |  20<L>  |  0.62    Ca    8.7      06 Aug 2021 06:58  Phos  3.8     08-06  Mg     2.00     08-06                  Tele Reviewed:    RADIOLOGY & ADDITIONAL TESTS:  Results Reviewed:   Imaging Personally Reviewed:  Electrocardiogram Personally Reviewed:

## 2021-08-06 NOTE — PROGRESS NOTE ADULT - PROBLEM SELECTOR PLAN 8
on valacyclovir at home for ppx during chemo    - c/w nystatin  - c/w home valacyclovir  - started caphasol

## 2021-08-06 NOTE — PROGRESS NOTE ADULT - REASON FOR ADMISSION
fall, thrombocytopenia, ankle fracture

## 2021-08-06 NOTE — DISCHARGE NOTE PROVIDER - PROVIDER TOKENS
PROVIDER:[TOKEN:[[id number]],FOLLOWUP:[1 week]],PROVIDER:[TOKEN:[[id number]],FOLLOWUP:[2 weeks],ESTABLISHEDPATIENT:[T]]

## 2021-08-06 NOTE — DISCHARGE NOTE NURSING/CASE MANAGEMENT/SOCIAL WORK - NSDCCRNAME_GEN_ALL_CORE_FT
Marcio address: [address] [place] [id number], 2:30pm  via [facility]

## 2021-08-06 NOTE — DISCHARGE NOTE PROVIDER - HOSPITAL COURSE
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 Course complicated by right knee hemarthrosis and L ankle fracture due to mechanical fall PTA. Patient's platelet count on arrival was 9. Eliquis was held due to low platelet count. Pt was given 1 unit of platelets and platelet count improved. Platelet count continued to increase throughout hospitalization and required no further transfusions. Patient showed no signs of bleeding. Patient was given folate, B12 and iron for her anemia. Hematology-oncology was consulted and recommended no inpatient treatment for DLBCL. Patient should follow up with outpatient heme/onc within 2 weeks. Her next chemo is scheduled for 8/19/21.     Orthopedic surgery was consulted for left distal fibular fracture seen on XR. Orthopedic team performed closed reduction and splinting of left ankle. No further surgical intervention was deemed necessary. Patient was also treated for right knee hemarthrosis. Compressive dressing was applied to right knee. Orthopedic team determined patient did not need arthrocentesis. Eliquis was resumed once platelet count was above 50 and once it was determined pt would not need arthrocentesis for hemarthrosis. Patient should follow up outpatient with orthopedic surgery in 1 week.     Patient is hemodynamically stable and medically optimized for discharge.

## 2021-08-06 NOTE — PROGRESS NOTE ADULT - PROBLEM SELECTOR PLAN 1
Likely traumatic hemarthrosis  - No surgical intervention per ortho  - Compressive dressing  - Pain control - tylenol   - WIll discuss with heme about when would be the appropriate time to resume A/C. Likely traumatic hemarthrosis  - No surgical intervention per ortho  - Compressive dressing  - Pain control - tylenol

## 2021-08-06 NOTE — DISCHARGE NOTE PROVIDER - NSDCFUSCHEDAPPT_GEN_ALL_CORE_FT
BETTIE PRATER ; 08/19/2021 ; [facility]  BETTIE PRATER ; 09/02/2021 ; [facility]

## 2021-08-06 NOTE — CHART NOTE - NSCHARTNOTEFT_GEN_A_CORE
Hematology Fellow Chart Note    Discussed case with primary team yesterday; recommended restarting patient's apixaban on 8/5/21. Continue to monitor knee for worsening swelling that could indicate hemoarthrosis after anticoagulation resumed.    Please call with any questions.    Lit Rahman MD, MPH  Hematology / Oncology Fellow, PGY7  p

## 2021-08-06 NOTE — DISCHARGE NOTE PROVIDER - CARE PROVIDER_API CALL
Zamzam Greene (MD; MPH)  Orthopaedic Surgery  [address], [address]  [place] [address]  Phone: [phone]  Fax: [phone]  Follow Up Time: 1 week    Zonia Madrigal)  Internal Medicine  [address], [address]  [place] [address]  Phone: [phone]  Fax: ()-  Established Patient  Follow Up Time: 2 weeks

## 2021-08-06 NOTE — PROGRESS NOTE ADULT - PROBLEM SELECTOR PLAN 4
Eliquis on hold due to hemarthrosis  - will discuss with heme on timing of resuming A/C - Eliquis resumed yesterday

## 2021-08-06 NOTE — DISCHARGE NOTE NURSING/CASE MANAGEMENT/SOCIAL WORK - PATIENT PORTAL LINK FT
You can access the FollowMyHealth Patient Portal offered by [facility] by registering at the following website: [email or web address]. By joining [employer]’s FollowMyHealth portal, you will also be able to view your health information using other applications (apps) compatible with our system.

## 2021-08-06 NOTE — PROGRESS NOTE ADULT - PROBLEM SELECTOR PLAN 2
likely 2/2 last chemo treatment on 7/29    - s/p 1U plt transfusion in ED  - CTH without bleeding  - appreciate heme onc recs about restarting eliquis   - c/w folate, B12, and iron likely 2/2 last chemo treatment on 7/22    - s/p 1U plt transfusion in ED  - CTH without bleeding  - c/w folate, B12, and iron  - resumed eliquis yesterday. No signs of bleeding. No need for plts or pRBCs at this time

## 2021-08-06 NOTE — PROGRESS NOTE ADULT - PROBLEM SELECTOR PLAN 7
h/o GERD. Takes omeprazole at home    - started protonix 40 mg BID h/o GERD. Takes omeprazole at home    - protonix 40 mg BID

## 2021-08-13 ENCOUNTER — APPOINTMENT (OUTPATIENT)
Dept: ORTHOPEDIC SURGERY | Facility: CLINIC | Age: 81
End: 2021-08-13
Payer: MEDICARE

## 2021-08-13 VITALS — WEIGHT: 142 LBS | HEIGHT: 62 IN | BODY MASS INDEX: 26.13 KG/M2

## 2021-08-13 VITALS — HEART RATE: 85 BPM | SYSTOLIC BLOOD PRESSURE: 118 MMHG | DIASTOLIC BLOOD PRESSURE: 68 MMHG

## 2021-08-13 DIAGNOSIS — S82.65XA NONDISPLACED FRACTURE OF LATERAL MALLEOLUS OF LEFT FIBULA, INITIAL ENCOUNTER FOR CLOSED FRACTURE: ICD-10-CM

## 2021-08-13 DIAGNOSIS — Z87.891 PERSONAL HISTORY OF NICOTINE DEPENDENCE: ICD-10-CM

## 2021-08-13 DIAGNOSIS — Z82.49 FAMILY HISTORY OF ISCHEMIC HEART DISEASE AND OTHER DISEASES OF THE CIRCULATORY SYSTEM: ICD-10-CM

## 2021-08-13 DIAGNOSIS — Z78.9 OTHER SPECIFIED HEALTH STATUS: ICD-10-CM

## 2021-08-13 DIAGNOSIS — Z80.8 FAMILY HISTORY OF MALIGNANT NEOPLASM OF OTHER ORGANS OR SYSTEMS: ICD-10-CM

## 2021-08-13 DIAGNOSIS — M17.11 UNILATERAL PRIMARY OSTEOARTHRITIS, RIGHT KNEE: ICD-10-CM

## 2021-08-13 DIAGNOSIS — Z85.72 PERSONAL HISTORY OF NON-HODGKIN LYMPHOMAS: ICD-10-CM

## 2021-08-13 PROCEDURE — 99204 OFFICE O/P NEW MOD 45 MIN: CPT | Mod: 57

## 2021-08-13 PROCEDURE — 27786 TREATMENT OF ANKLE FRACTURE: CPT | Mod: LT

## 2021-08-13 PROCEDURE — 73610 X-RAY EXAM OF ANKLE: CPT | Mod: LT

## 2021-08-13 NOTE — HISTORY OF PRESENT ILLNESS
[de-identified] : 80 year old female with lymphoma presents for evaluation of a left ankle injury sustained 7/30/21. She was feeling weak after chemotherapy and fell injuring her left ankle and right knee. Her left ankle swelled immediately and she had difficulty bearing weight. She was evaluated in the ED at [facility] where she was diagnosed with fracture of the distal fibula which was reduced and splinted. She was discharged to [facility]. She complains of persistent pain in her right knee. She has been favoring her right leg while transferring from her wheelchair. She is doing PT and OT daily.

## 2021-08-13 NOTE — PHYSICAL EXAM
[Slightly Antalgic] : slightly antalgic [Wheelchair] : uses a wheelchair [LE] : Sensory: Intact in bilateral lower extremities [DP] : dorsalis pedis 2+ and symmetric bilaterally [PT] : posterior tibial 2+ and symmetric bilaterally [Normal] : Alert and in no acute distress [Poor Appearance] : well-appearing [Acute Distress] : not in acute distress [de-identified] : The patient has no respiratory distress. Mood and affect are normal. The patient is alert and oriented to person, place and time.\par The patient is in splints on the left lower extremity.  The splints are removed.  The skin is intact.  She has mild lateral tenderness at the left ankle.  Achilles tendon is intact.  Examination of the right lower extremity demonstrates mild anterior tenderness of the right knee.  There is also lateral sided tenderness.  Collateral and cruciate ligaments are stable.  Quadriceps and hamstring function are intact.  The calves are nontender. [de-identified] : \par EXAM: XR FOOT COMP MIN 3 VIEWS LT\par EXAM: XR ANKLE 2 VIEWS LT\par PROCEDURE DATE: Jul 30 2021\par INTERPRETATION: CLINICAL INDICATION: Left ankle pain\par \par TECHNIQUE: 3 view radiograph the left ankle, 3 view radiograph of the left foot\par \par COMPARISON: No similar prior comparisons available.\par \par FINDINGS:\par \par There is an acute obliquely oriented minimally displaced fracture of the distal fibular metadiaphysis extending to the level of the tibial plafond. Soft tissue swelling overlies the lateral malleolus. There is no acute dislocation.\par \par IMPRESSION:\par \par Acute obliquely oriented minimally displaced fracture of the distal fibular metadiaphysis.\par \par --- End of Report ---\par \par CHAVEZ LEMUS MD; Resident Interventional Radiology\par This document has been electronically signed.\par ROD SOUSA MD; Attending Radiologist\par This document has been electronically signed. Jul 31 2021 8:54AM\par \par \par \par \par \par \par EXAM: XR ANKLE 2 VIEWS LT\par EXAM: XR TIB FIB AP LAT 2 VIEWS LT\par PROCEDURE DATE: Jul 31 2021\par \par INTERPRETATION: CLINICAL INDICATION: left ankle fracture; evaluation with applied stress and following splinting\par \par EXAM:\par Frontal, oblique, and lateral left leg and stress view of left ankle from 7/31/2021 and 0202. Compared to left ankle radiographs from previous day.\par \par IMPRESSION:\par Redemonstrated slightly offset obliquely oriented distal fibular fracture slightly above joint line level.\par \par No abnormal ankle mortise widening appreciated with applied stress.\par \par Ultimately applied short leg splint stabilizing the distal fibular fracture without significant change in configuration or alignment compared to prereduction images and with residual minimal cortical offset again noted.\par \par No dislocations or additional fractures.\par \par Preserved joint spaces and no joint margin erosions.\par \par Generalized osteopenia otherwise no discrete lytic or blastic lesions.\par \par --- End of Report ---\par \par CORINA OSULLIVAN MD; Attending Radiologist\par This document has been electronically signed. Jul 31 2021 11:25AM\par \par \par \par \par \par EXAM: XR KNEE COMP 4+ VIEWS RT\par PROCEDURE DATE: Aug 3 2021\par INTERPRETATION: Clinical indications: Fall with right knee pain.\par \par 3 views of the right knee are without comparison.\par \par IMPRESSION: There is no acute fracture or dislocation. There is moderate lateral tibiofemoral component joint space narrowing with spurring consistent with osteoarthrosis. Evaluation for a knee joint effusion is limited by obliquity. Small calcifications are noted within the anterior subcutaneous tissues at the level of the proximal tibia.\par \par --- End of Report ---\par \par CITLALY BECK MD; Attending Radiologist\par This document has been electronically signed. Aug 3 2021 11:46AM\par \par \par AP, lateral and mortise x-rays of the left ankle taken today demonstrate distal fibular fracture in good position.\par \par X-rays of the left ankle and right knee taken July 30, 2021 are reviewed.  Patient has a fracture of the left distal fibula.  Stress views confirm stability of the ankle.  Post casting views confirm position.  Right knee x-rays demonstrate arthritis.

## 2021-08-13 NOTE — DISCUSSION/SUMMARY
[de-identified] : The patient has arthritis of the right knee.  She has had a recent increase in pain because of the need to bear weight on her right leg while protecting her left ankle fracture.  In terms of the left lateral malleolus fracture it is stable.  She is switched to a short leg cast.  I anticipate 4 weeks in the cast.  She will be reevaluated in 2 weeks.  She is to remain nonweightbearing on the left side at this time.

## 2021-08-19 ENCOUNTER — RESULT REVIEW (OUTPATIENT)
Age: 81
End: 2021-08-19

## 2021-08-19 ENCOUNTER — OUTPATIENT (OUTPATIENT)
Dept: OUTPATIENT SERVICES | Facility: HOSPITAL | Age: 81
LOS: 1 days | Discharge: HOME | End: 2021-08-19

## 2021-08-19 ENCOUNTER — APPOINTMENT (OUTPATIENT)
Dept: INFUSION THERAPY | Facility: HOSPITAL | Age: 81
End: 2021-08-19

## 2021-08-19 DIAGNOSIS — Z51.11 ENCOUNTER FOR ANTINEOPLASTIC CHEMOTHERAPY: ICD-10-CM

## 2021-08-19 DIAGNOSIS — Z90.49 ACQUIRED ABSENCE OF OTHER SPECIFIED PARTS OF DIGESTIVE TRACT: Chronic | ICD-10-CM

## 2021-08-19 DIAGNOSIS — R11.2 NAUSEA WITH VOMITING, UNSPECIFIED: ICD-10-CM

## 2021-08-19 DIAGNOSIS — C83.30 DIFFUSE LARGE B-CELL LYMPHOMA, UNSPECIFIED SITE: ICD-10-CM

## 2021-08-19 LAB
BASOPHILS # BLD AUTO: 0.1 K/UL — SIGNIFICANT CHANGE UP (ref 0–0.2)
BASOPHILS NFR BLD AUTO: 3 % — HIGH (ref 0–2)
EOSINOPHIL # BLD AUTO: 0.07 K/UL — SIGNIFICANT CHANGE UP (ref 0–0.5)
EOSINOPHIL NFR BLD AUTO: 2 % — SIGNIFICANT CHANGE UP (ref 0–6)
HCT VFR BLD CALC: 33.7 % — LOW (ref 34.5–45)
HGB BLD-MCNC: 11.6 G/DL — SIGNIFICANT CHANGE UP (ref 11.5–15.5)
LYMPHOCYTES # BLD AUTO: 0.92 K/UL — LOW (ref 1–3.3)
LYMPHOCYTES # BLD AUTO: 27 % — SIGNIFICANT CHANGE UP (ref 13–44)
MCHC RBC-ENTMCNC: 34.4 G/DL — SIGNIFICANT CHANGE UP (ref 32–36)
MCHC RBC-ENTMCNC: 37.8 PG — HIGH (ref 27–34)
MCV RBC AUTO: 109.8 FL — HIGH (ref 80–100)
MONOCYTES # BLD AUTO: 0.51 K/UL — SIGNIFICANT CHANGE UP (ref 0–0.9)
MONOCYTES NFR BLD AUTO: 15 % — HIGH (ref 2–14)
MYELOCYTES NFR BLD: 1 % — HIGH (ref 0–0)
NEUTROPHILS # BLD AUTO: 1.77 K/UL — LOW (ref 1.8–7.4)
NEUTROPHILS NFR BLD AUTO: 52 % — SIGNIFICANT CHANGE UP (ref 43–77)
NRBC # BLD: 0 /100 — SIGNIFICANT CHANGE UP (ref 0–0)
NRBC # BLD: SIGNIFICANT CHANGE UP /100 WBCS (ref 0–0)
PLAT MORPH BLD: NORMAL — SIGNIFICANT CHANGE UP
PLATELET # BLD AUTO: 225 K/UL — SIGNIFICANT CHANGE UP (ref 150–400)
RBC # BLD: 3.07 M/UL — LOW (ref 3.8–5.2)
RBC # FLD: 15.8 % — HIGH (ref 10.3–14.5)
RBC BLD AUTO: SIGNIFICANT CHANGE UP
WBC # BLD: 3.41 K/UL — LOW (ref 3.8–10.5)
WBC # FLD AUTO: 3.41 K/UL — LOW (ref 3.8–10.5)

## 2021-08-27 ENCOUNTER — APPOINTMENT (OUTPATIENT)
Dept: ORTHOPEDIC SURGERY | Facility: CLINIC | Age: 81
End: 2021-08-27
Payer: MEDICARE

## 2021-08-27 VITALS
WEIGHT: 142 LBS | HEART RATE: 98 BPM | DIASTOLIC BLOOD PRESSURE: 72 MMHG | HEIGHT: 62 IN | SYSTOLIC BLOOD PRESSURE: 113 MMHG | BODY MASS INDEX: 26.13 KG/M2

## 2021-08-27 DIAGNOSIS — S82.62XD DISPLACED FRACTURE OF LATERAL MALLEOLUS OF LEFT FIBULA, SUBSEQUENT ENCOUNTER FOR CLOSED FRACTURE WITH ROUTINE HEALING: ICD-10-CM

## 2021-08-27 PROCEDURE — 73610 X-RAY EXAM OF ANKLE: CPT | Mod: 26,LT

## 2021-08-27 PROCEDURE — 99024 POSTOP FOLLOW-UP VISIT: CPT

## 2021-08-27 NOTE — PHYSICAL EXAM
[Slightly Antalgic] : slightly antalgic [Wheelchair] : uses a wheelchair [LE] : Sensory: Intact in bilateral lower extremities [DP] : dorsalis pedis 2+ and symmetric bilaterally [PT] : posterior tibial 2+ and symmetric bilaterally [Normal] : Alert and in no acute distress [Poor Appearance] : well-appearing [Acute Distress] : not in acute distress [de-identified] : The patient has no respiratory distress. Mood and affect are normal. The patient is alert and oriented to person, place and time.\par Short leg cast on the left lower extremity is in good condition.  She has full toe motion and intact sensation.  There is no pain with motion of the left hip or knee. [de-identified] : AP, lateral and mortise x-rays of the left ankle demonstrate well reduced mortise with lateral malleolus fracture in good position.

## 2021-08-27 NOTE — HISTORY OF PRESENT ILLNESS
[de-identified] : \par The patient returns for reevaluation of her left lateral malleolus fracture.  It is now 4 weeks following the injury.  She is comfortable in the short leg cast.  She has been nonweightbearing.

## 2021-08-27 NOTE — DISCUSSION/SUMMARY
[de-identified] : The patient's left ankle fracture is healing.  She will be maintained in the cast.  She will return in 2 weeks for evaluation out of the cast.

## 2021-09-09 ENCOUNTER — APPOINTMENT (OUTPATIENT)
Dept: INFUSION THERAPY | Facility: HOSPITAL | Age: 81
End: 2021-09-09

## 2021-09-09 ENCOUNTER — LABORATORY RESULT (OUTPATIENT)
Age: 81
End: 2021-09-09

## 2021-09-09 ENCOUNTER — RESULT REVIEW (OUTPATIENT)
Age: 81
End: 2021-09-09

## 2021-09-09 ENCOUNTER — APPOINTMENT (OUTPATIENT)
Dept: HEMATOLOGY ONCOLOGY | Facility: CLINIC | Age: 81
End: 2021-09-09
Payer: MEDICARE

## 2021-09-09 DIAGNOSIS — Z51.89 ENCOUNTER FOR OTHER SPECIFIED AFTERCARE: ICD-10-CM

## 2021-09-09 LAB
BASOPHILS # BLD AUTO: 0.04 K/UL — SIGNIFICANT CHANGE UP (ref 0–0.2)
BASOPHILS NFR BLD AUTO: 1 % — SIGNIFICANT CHANGE UP (ref 0–2)
EOSINOPHIL # BLD AUTO: 0.11 K/UL — SIGNIFICANT CHANGE UP (ref 0–0.5)
EOSINOPHIL NFR BLD AUTO: 3 % — SIGNIFICANT CHANGE UP (ref 0–6)
HCT VFR BLD CALC: 34.9 % — SIGNIFICANT CHANGE UP (ref 34.5–45)
HGB BLD-MCNC: 11.7 G/DL — SIGNIFICANT CHANGE UP (ref 11.5–15.5)
LYMPHOCYTES # BLD AUTO: 1.02 K/UL — SIGNIFICANT CHANGE UP (ref 1–3.3)
LYMPHOCYTES # BLD AUTO: 28 % — SIGNIFICANT CHANGE UP (ref 13–44)
MCHC RBC-ENTMCNC: 33.5 G/DL — SIGNIFICANT CHANGE UP (ref 32–36)
MCHC RBC-ENTMCNC: 37.4 PG — HIGH (ref 27–34)
MCV RBC AUTO: 111.5 FL — HIGH (ref 80–100)
MONOCYTES # BLD AUTO: 0.29 K/UL — SIGNIFICANT CHANGE UP (ref 0–0.9)
MONOCYTES NFR BLD AUTO: 8 % — SIGNIFICANT CHANGE UP (ref 2–14)
MYELOCYTES NFR BLD: 1 % — HIGH (ref 0–0)
NEUTROPHILS # BLD AUTO: 2.15 K/UL — SIGNIFICANT CHANGE UP (ref 1.8–7.4)
NEUTROPHILS NFR BLD AUTO: 59 % — SIGNIFICANT CHANGE UP (ref 43–77)
NRBC # BLD: 0 /100 — SIGNIFICANT CHANGE UP (ref 0–0)
NRBC # BLD: SIGNIFICANT CHANGE UP /100 WBCS (ref 0–0)
PLAT MORPH BLD: NORMAL — SIGNIFICANT CHANGE UP
PLATELET # BLD AUTO: 158 K/UL — SIGNIFICANT CHANGE UP (ref 150–400)
RBC # BLD: 3.13 M/UL — LOW (ref 3.8–5.2)
RBC # FLD: 13.7 % — SIGNIFICANT CHANGE UP (ref 10.3–14.5)
RBC BLD AUTO: SIGNIFICANT CHANGE UP
WBC # BLD: 3.65 K/UL — LOW (ref 3.8–10.5)
WBC # FLD AUTO: 3.65 K/UL — LOW (ref 3.8–10.5)

## 2021-09-09 PROCEDURE — 99215 OFFICE O/P EST HI 40 MIN: CPT

## 2021-09-09 NOTE — REVIEW OF SYSTEMS
[Fatigue] : fatigue [Recent Change In Weight] : ~T recent weight change [Shortness Of Breath] : shortness of breath [Negative] : Allergic/Immunologic [FreeTextEntry2] : worsened fatigue after C3 [FreeTextEntry7] : Reflux [FreeTextEntry4] : healing mouth sores, improved

## 2021-09-09 NOTE — ASSESSMENT
[FreeTextEntry1] : Ms. Magallon is a 79 yo female with PMHx of  ANCA vasculitis (dx 20 years ago, treated with Azathioprine since then, being followed by Rheum outpt) who presents today for initial consultation for newly Diagnosed Diffuse Large B cell Lymphoma. Pt presented tot he ER on 4/13/21 with chest pain and exertional shortness of breath. Patient reported recent travel to [place] in car (~10 hrs ride). She had a CT angio done which showed right lower lobe and middle lobe pulmonary arterial emboli. She was noted to have B/l LE DVTs - she was started on heparin and switched to Eliquis. Pt need to have chest tube placement as well, discharged with home O2.\par \par In addition pt was found to have large b/l lung masses measuring up to 8.6cm. \par Pulm was consulted and pt underwent biopsy/VATs on 4/16/21, path showed  EBV+ diffuse large B-cell lymphoma. Lymphoma cells are B cells, positive VEENA, CD20, CD79a, Pax5 (dim), Mum-1, Bcl2, CD43,variable CD30, negative CD5, CD10, CD23, cyclin D1, p53 (<5%), cMYC (<20%). Ki-67 proliferation index is high (>90%). CD3+ T cells comprise a minor population in the infiltrate.  stains few plasma cells; B cells are lambda-restricted. \par \par Patient is s/p  CHOP on 5/19,  Rituxan and IT MTX on 5/20 at [facility]. She received Cycle 2 as outpatient. Cycle 4 was complicated by significant neutropenia, thrombocytopenia and fall which caused an ankle fracture. She was discharged form [facility] to [facility]. she left [facility] 1 weeks ago and is nearly independent with all IADL/ADls and is moving well. She received C5 fo R-miniCHOP which was overall well tolerated. \par \par #Diffuse Large B Cell Lymphoma EBV positive\par -PET/CT 5/10/21: Status post wedge resection of left upper lobe lung nodule, as compared to CT dated 4/13/2021. Additional FDG-avid, partially necrotic bilateral lung masses and mildly avid groundglass opacities are not significantly changed as compared to prior CT, compatible with biopsy-proven lymphoma. 2. Few mildly FDG-avid, mildly enlarged mediastinal and bilateral hilar lymph nodes are nonspecific. 3. Previously seen indeterminate 3.1 cm lesion in the upper pole of right kidney s FDG-avid. 4. FDG-avid left adrenal nodule is indeterminate. \par -MRI 5/17/21: Right upper pole renal lesion significantly decreased in size of uncertain etiology. A 1.3 cm left adrenal nodule remains indeterminate.\par -s/p C1 RCHOP with IT MTX on 5/19 and 5/20\par - Echo was done inpatient on 4/21/21 with an EF of 69%\par -s/p C3 RCHOP at [facility] on 7/1/2021\par -s/p C4 RCHOP on 7/22/21\par -received C5 of R-miniCHOP on 8/19/21\par -due to worsening fatigue with chemo, will schedule at home weekly labs and IVFs\par -Interim PET/CT 6/25/21 shows no progression of disease and is responding to treatment. Plan to repeat PET/CT after C4\par -c/w Magic Mouth wash for mouth sores. \par -c/w valtrex\par -will check EOT PET/CT \par \par #PE/DVT\par - Pt is currently on Eliquis\par -would continue until 6 months after CR from DLBCL \par \par #Right Kidney lesion\par - Pt was noted to have a hypodense lesion on right upper pole on CT done 4/13/21\par - consider MRI after interim PET/CT for further characterization, suspect this could be related to DLBCL\par \par #ANCA positive vasculitis \par -hold azathioprine\par -discussed case with Dr. Romano- rituximab should maintain her remission and will monitor symptoms after completion of therapy \par -monitor for symptoms of recurrence (bone/back pain) \par \par Follow up in 2 weeks

## 2021-09-09 NOTE — HISTORY OF PRESENT ILLNESS
[de-identified] : Ms. Magallon is a 79 yo female with PMHx of ANCA vasculitis (dx 20 years ago, been on Azathioprine since then, being followed by Rheum outpt) who presents today for initial consultation for newly Diagnosed Diffuse Large B cell Lymphoma. Pt noticed earlier this year that she had more SOB with activity and fatigue. She did not have night sweats, fever/chills or weight loss with this fatigue.\par Pt presented tot he ER on 4/13/21 with chest pain lasting and exertional shortness of breath. Patient reported recent travel to [place] in car (~10 hrs ride). She had a CT angio done which showed right lower lobe and middle lobe pulmonary arterial emboli. She was noted to have B/l LE DVTs - she was started on heparin and switched to Eliquis. Pt need to have chest tube placement as well and was d/c on O2 therapy. Pt states she used O2 for the first few days at home but does not currently use it. O2 stats at home have been in the 90s\par In addition pt was found to have large b/l lung masses measuring up to 8.6cm. \par Pulm was consulted and pt underwent biopsy/VATs on 4/16/21, path showed  EBV+ diffuse large B-cell lymphoma. Lymphoma cells are B cells, positive VEENA, CD20, CD79a, Pax5 (dim), Mum-1, Bcl2, CD43,variable CD30, negative CD5, CD10, CD23, cyclin D1, p53 (<5%), cMYC (<20%). Ki-67 proliferation index is high (>90%). CD3+ T cells comprise a minor population in the infiltrate.  stains few plasma cells; B cells are lambda-restricted. Cam 5.2, P40, TTF-1, synaptophysin, chromogranin, PAX-8 and MALU-3 were performed on block 2C. They are negative in lymphoma cells.\par \par She received Cycle 1 of R-CHOP on 5/20/21 while admitted to monitor her respiratory function. She had a LP with IT MTX which did not demonstrate involvement with lymphoma.  She received C2-C3 only complicated by fatigue. After C4, she became neutropenic and profoundly fatigued. She has a fall at home prior to presenting to [facility] for IVF hydration. At that time, platelets were 10K. She was sent ot the ER and admitted for cytopenias and new ankle fracture. She was discharged to rehab at [facility]. She received C5 of mini-R-CHOP given her prior complications which was overall well tolerated. \par \par \par  [de-identified] : S/p  Cycle 5 R-miniCHOP on 8/19/21:\par 9/9/21: Since last visit, she was discharged from [facility] rehab. Overall she feels very strong. Her pain is well controlled with occasional tylenol. She is seeing orthopedics tomorrow. She remains on xarelto. She has no mouth sores. No fevers/chills. No drenching sweats. Appetite is okay, not the best. Weight stable.No abd pain. No N/V/D. No numbness/tingling. No CP. No palpitations. She has noticed increase in SOB with exertion. No swelling. No bleeding/easy bruising. No dysuria. Constipation has improved with laxatives. \par

## 2021-09-10 ENCOUNTER — APPOINTMENT (OUTPATIENT)
Dept: ORTHOPEDIC SURGERY | Facility: CLINIC | Age: 81
End: 2021-09-10
Payer: MEDICARE

## 2021-09-10 VITALS
WEIGHT: 142 LBS | HEIGHT: 62 IN | BODY MASS INDEX: 26.13 KG/M2 | DIASTOLIC BLOOD PRESSURE: 76 MMHG | SYSTOLIC BLOOD PRESSURE: 174 MMHG | HEART RATE: 73 BPM

## 2021-09-10 PROCEDURE — 99024 POSTOP FOLLOW-UP VISIT: CPT

## 2021-09-10 PROCEDURE — 73610 X-RAY EXAM OF ANKLE: CPT | Mod: LT

## 2021-09-15 LAB — EBV DNA SERPL NAA+PROBE-ACNC: NOT DETECTED IU/ML

## 2021-09-23 ENCOUNTER — APPOINTMENT (OUTPATIENT)
Dept: HEMATOLOGY ONCOLOGY | Facility: CLINIC | Age: 81
End: 2021-09-23
Payer: MEDICARE

## 2021-09-23 VITALS
HEART RATE: 86 BPM | OXYGEN SATURATION: 98 % | WEIGHT: 141 LBS | DIASTOLIC BLOOD PRESSURE: 70 MMHG | SYSTOLIC BLOOD PRESSURE: 142 MMHG | BODY MASS INDEX: 25.79 KG/M2

## 2021-09-23 PROCEDURE — 99214 OFFICE O/P EST MOD 30 MIN: CPT | Mod: GC

## 2021-09-23 PROCEDURE — 85025 COMPLETE CBC W/AUTO DIFF WBC: CPT | Mod: GC

## 2021-09-23 NOTE — REVIEW OF SYSTEMS
[FreeTextEntry2] : worsened fatigue after C3 [FreeTextEntry7] : Reflux [FreeTextEntry4] : healing mouth sores, improved

## 2021-09-23 NOTE — PHYSICAL EXAM
[Restricted in physically strenuous activity but ambulatory and able to carry out work of a light or sedentary nature] : Status 1- Restricted in physically strenuous activity but ambulatory and able to carry out work of a light or sedentary nature, e.g., light house work, office work [Normal] : affect appropriate [de-identified] : left ankle boot [de-identified] : lesions on b/l LE - small, irregular shape, with erythema and scales

## 2021-09-23 NOTE — ASSESSMENT
[FreeTextEntry1] : Ms. Magallon is a 79 yo female with PMHx of  ANCA vasculitis (dx 20 years ago, treated with Azathioprine since then, being followed by Rheum outpt) who presents today for initial consultation for newly Diagnosed Diffuse Large B cell Lymphoma. Pt presented tot he ER on 4/13/21 with chest pain and exertional shortness of breath. Patient reported recent travel to [place] in car (~10 hrs ride). She had a CT angio done which showed right lower lobe and middle lobe pulmonary arterial emboli. She was noted to have B/l LE DVTs - she was started on heparin and switched to Eliquis. Pt need to have chest tube placement as well, discharged with home O2.\par \par In addition pt was found to have large b/l lung masses measuring up to 8.6cm. \par Pulm was consulted and pt underwent biopsy/VATs on 4/16/21, path showed  EBV+ diffuse large B-cell lymphoma. Lymphoma cells are B cells, positive VEENA, CD20, CD79a, Pax5 (dim), Mum-1, Bcl2, CD43,variable CD30, negative CD5, CD10, CD23, cyclin D1, p53 (<5%), cMYC (<20%). Ki-67 proliferation index is high (>90%). CD3+ T cells comprise a minor population in the infiltrate.  stains few plasma cells; B cells are lambda-restricted. \par \par Patient is s/p  CHOP on 5/19,  Rituxan and IT MTX on 5/20 at [facility]. She received Cycle 2 as outpatient. Cycle 4 was complicated by significant neutropenia, thrombocytopenia and fall which caused an ankle fracture. She was discharged form [facility] to [facility]. she left [facility] 1 weeks ago and is nearly independent with all IADL/ADls and is moving well. She received C5 fo R-miniCHOP which was overall well tolerated. \par \par #Diffuse Large B Cell Lymphoma EBV positive\par -PET/CT 5/10/21: Status post wedge resection of left upper lobe lung nodule, as compared to CT dated 4/13/2021. Additional FDG-avid, partially necrotic bilateral lung masses and mildly avid groundglass opacities are not significantly changed as compared to prior CT, compatible with biopsy-proven lymphoma. 2. Few mildly FDG-avid, mildly enlarged mediastinal and bilateral hilar lymph nodes are nonspecific. 3. Previously seen indeterminate 3.1 cm lesion in the upper pole of right kidney s FDG-avid. 4. FDG-avid left adrenal nodule is indeterminate. \par -MRI 5/17/21: Right upper pole renal lesion significantly decreased in size of uncertain etiology. A 1.3 cm left adrenal nodule remains indeterminate.\par -s/p C1 RCHOP with IT MTX on 5/19 and 5/20\par - Echo was done inpatient on 4/21/21 with an EF of 69%\par -s/p C3 RCHOP at [facility] on 7/1/2021\par -s/p C4 RCHOP on 7/22/21\par -received C5 of R-miniCHOP on 8/19/21 and C6 on 9/9/21\par -due to worsening fatigue with chemo, will schedule at home weekly labs and IVFs\par -Interim PET/CT 6/25/21 shows no progression of disease and is responding to treatment. Plan to repeat PET/CT after C6 -scheduled for 9/27/21\par -EBV level now undetectable, will check at each visit \par -c/w Magic Mouth wash for mouth sores. \par - valtrex refilled\par \par \par #PE/DVT\par - Pt is currently on Eliquis\par -would continue until 6 months after CR from DLBCL \par \par #Right Kidney lesion\par - Pt was noted to have a hypodense lesion on right upper pole on CT done 4/13/21\par - consider MRI after interim PET/CT for further characterization, suspect this could be related to DLBCL\par \par #ANCA positive vasculitis \par -hold azathioprine\par -discussed case with Dr. Romano- rituximab should maintain her remission and will monitor symptoms after completion of therapy \par -monitor for symptoms of recurrence (bone/back pain) \par \par Follow up in 4 weeks

## 2021-09-23 NOTE — RESULTS/DATA
[FreeTextEntry1] : CBC 9/23/21: reviewed \par wbc - 5.6\par hgb - 12.0\par plt - 150\par anc -3.21\par alc - 2.08\par \par Interim PET/CT 6/25/21: \par IMPRESSION: Compared to FDG-PET/CT scan dated 5/10/2021:\par \par 1. Mildly FDG-avid bilateral lung nodules are decreased in size and metabolism, compatible with a partial response to interval therapy (Deauville 4). Resolution of mildly FDG-avid bilateral groundglass pulmonary opacities.\par \par 2. Few minimally FDG-avid mediastinal and bilateral hilar lymph nodes are similar in size and decreased in metabolism.\par \par 3. Resolution of FDG activity associated with right upper pole renal lesion which is better delineated on prior contrast-enhanced CT and interval MRI.\par \par 4. Resolution of FDG-avid left adrenal nodule.\par \par 5. Resolution of increased FDG activity in anterior aspect of left 9th rib.\par \par 6. Resolution of linear focus of increased FDG activity in lateral aspect of left chest wall.\par \par 7. Complete opacification of left maxillary sinus with mild, peripheral FDG avidity, unchanged. Correlate clinically for acute sinusitis.\par \par \par \par \par \par \par \par \par \par KIRILL MIR MD; Attending Nuclear Medicine\par This document has been electronically signed. Jun 28 2021  3:35PM\par \par  \par \par \par Final Diagnosis\par 1. 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 Suggest correlation with clinical\par and therapeutic history for underlying immune deficiency; test\par for EBV viral load is recommended.\par \par Microscopic description: Sections show dense lymphoid infiltrate\par with necrosis; predominantly medium-large sized cells forming\par larger clusters and sheets. Angiocentric and angiodestructive\par pattern is noted; confirmed by elastin stain on block 2B.\par Immunohistochemical stains for CD3, CD20, CD79a, Pax5, ,\par Bcl-2, Bcl-6, Mum-1, CD5, CD10, CD23, CD30, CD43, cyclin D1,\par cMYC, p53, and Ki-67, AE1/AE3,  in situ hybridization for\par Renay-Barr virus-\par -encoded small RNA (VEENA), kappa and lambda\par light chains were performed on block 2B. Lymphoma cells are B\par cells, positive VEENA, CD20, CD79a, Pax5 (dim), Mum-1, Bcl2, CD43,\par variable CD30, negative CD5, CD10, CD23, cyclin D1, p53 (<5%),\par cMYC (<20%). Ki-67 proliferation index is high (>90%). CD3+ T\par cells comprise a minor population in the infiltrate.  stains\par few plasma cells; B cells are lambda-restricted.\par Cam 5.2, P40, TTF-1, synaptohysing, chromogranin, PAX-8 and MALU-\par 3 were performed on block 2C. They are negative in lymphoma\par cells.\par \par

## 2021-09-23 NOTE — END OF VISIT
[FreeTextEntry3] : Patient seen and case discussed with MARGRET Man. I agree with above and have edited the note where needed.\par

## 2021-09-23 NOTE — HISTORY OF PRESENT ILLNESS
[de-identified] : Ms. Magallon is a 79 yo female with PMHx of ANCA vasculitis (dx 20 years ago, been on Azathioprine since then, being followed by Rheum outpt) who presents today for initial consultation for newly Diagnosed Diffuse Large B cell Lymphoma. Pt noticed earlier this year that she had more SOB with activity and fatigue. She did not have night sweats, fever/chills or weight loss with this fatigue.\par Pt presented tot he ER on 4/13/21 with chest pain lasting and exertional shortness of breath. Patient reported recent travel to [place] in car (~10 hrs ride). She had a CT angio done which showed right lower lobe and middle lobe pulmonary arterial emboli. She was noted to have B/l LE DVTs - she was started on heparin and switched to Eliquis. Pt need to have chest tube placement as well and was d/c on O2 therapy. Pt states she used O2 for the first few days at home but does not currently use it. O2 stats at home have been in the 90s\par In addition pt was found to have large b/l lung masses measuring up to 8.6cm. \par Pulm was consulted and pt underwent biopsy/VATs on 4/16/21, path showed  EBV+ diffuse large B-cell lymphoma. Lymphoma cells are B cells, positive VEENA, CD20, CD79a, Pax5 (dim), Mum-1, Bcl2, CD43,variable CD30, negative CD5, CD10, CD23, cyclin D1, p53 (<5%), cMYC (<20%). Ki-67 proliferation index is high (>90%). CD3+ T cells comprise a minor population in the infiltrate.  stains few plasma cells; B cells are lambda-restricted. Cam 5.2, P40, TTF-1, synaptophysin, chromogranin, PAX-8 and MALU-3 were performed on block 2C. They are negative in lymphoma cells.\par \par She received Cycle 1 of R-CHOP on 5/20/21 while admitted to monitor her respiratory function. She had a LP with IT MTX which did not demonstrate involvement with lymphoma.  She received C2-C3 only complicated by fatigue. After C4, she became neutropenic and profoundly fatigued. She has a fall at home prior to presenting to [facility] for IVF hydration. At that time, platelets were 10K. She was sent ot the ER and admitted for cytopenias and new ankle fracture. She was discharged to rehab at [facility]. She received C5 of mini-R-CHOP given her prior complications which was overall well tolerated. \par \par \par  [de-identified] : S/p  Cycle 6 R-miniCHOP on 9/9/21:\par 9/23/21: Pt feels wells, she is ambulating with a cane now and continues with PT. She remains on xarelto. She has no mouth sores. No fevers/chills. No drenching sweats. Appetite is okay, not the best. Weight stable.No abd pain. No N/V/D. No numbness/tingling. No CP. No palpitations. Her breathing has improved. No swelling. No bleeding/easy bruising. No dysuria. Constipation has improved with laxatives. She has noticed new right eye irritation/ blurry vision for the past few weeks.\par

## 2021-09-24 LAB
ALBUMIN SERPL ELPH-MCNC: 4.2 G/DL
ALP BLD-CCNC: 81 U/L
ALT SERPL-CCNC: 13 U/L
ANION GAP SERPL CALC-SCNC: 16 MMOL/L
AST SERPL-CCNC: 26 U/L
BILIRUB SERPL-MCNC: 0.3 MG/DL
BUN SERPL-MCNC: 14 MG/DL
CALCIUM SERPL-MCNC: 9.3 MG/DL
CHLORIDE SERPL-SCNC: 106 MMOL/L
CO2 SERPL-SCNC: 21 MMOL/L
CREAT SERPL-MCNC: 0.79 MG/DL
GLUCOSE SERPL-MCNC: 85 MG/DL
LDH SERPL-CCNC: 255 U/L
MAGNESIUM SERPL-MCNC: 2 MG/DL
PHOSPHATE SERPL-MCNC: 2.9 MG/DL
POTASSIUM SERPL-SCNC: 4.3 MMOL/L
PROT SERPL-MCNC: 6.2 G/DL
SODIUM SERPL-SCNC: 142 MMOL/L
URATE SERPL-MCNC: 5.5 MG/DL

## 2021-09-27 ENCOUNTER — APPOINTMENT (OUTPATIENT)
Dept: NUCLEAR MEDICINE | Facility: IMAGING CENTER | Age: 81
End: 2021-09-27
Payer: MEDICARE

## 2021-09-27 ENCOUNTER — OUTPATIENT (OUTPATIENT)
Dept: OUTPATIENT SERVICES | Facility: HOSPITAL | Age: 81
LOS: 1 days | End: 2021-09-27
Payer: MEDICARE

## 2021-09-27 DIAGNOSIS — C83.30 DIFFUSE LARGE B-CELL LYMPHOMA, UNSPECIFIED SITE: ICD-10-CM

## 2021-09-27 DIAGNOSIS — Z90.49 ACQUIRED ABSENCE OF OTHER SPECIFIED PARTS OF DIGESTIVE TRACT: Chronic | ICD-10-CM

## 2021-09-27 PROCEDURE — A9552: CPT

## 2021-09-27 PROCEDURE — 78815 PET IMAGE W/CT SKULL-THIGH: CPT | Mod: 26,PS,MH

## 2021-09-27 PROCEDURE — 78815 PET IMAGE W/CT SKULL-THIGH: CPT

## 2021-10-01 ENCOUNTER — APPOINTMENT (OUTPATIENT)
Dept: ORTHOPEDIC SURGERY | Facility: CLINIC | Age: 81
End: 2021-10-01
Payer: MEDICARE

## 2021-10-01 VITALS — DIASTOLIC BLOOD PRESSURE: 79 MMHG | SYSTOLIC BLOOD PRESSURE: 143 MMHG | HEART RATE: 106 BPM

## 2021-10-01 DIAGNOSIS — S82.65XD NONDISPLACED FRACTURE OF LATERAL MALLEOLUS OF LEFT FIBULA, SUBSEQUENT ENCOUNTER FOR CLOSED FRACTURE WITH ROUTINE HEALING: ICD-10-CM

## 2021-10-01 PROCEDURE — 99024 POSTOP FOLLOW-UP VISIT: CPT

## 2021-10-01 PROCEDURE — 73610 X-RAY EXAM OF ANKLE: CPT | Mod: LT

## 2021-10-01 NOTE — PHYSICAL EXAM
[Slightly Antalgic] : slightly antalgic [Wheelchair] : uses a wheelchair [LE] : Sensory: Intact in bilateral lower extremities [DP] : dorsalis pedis 2+ and symmetric bilaterally [PT] : posterior tibial 2+ and symmetric bilaterally [Normal] : Alert and in no acute distress [Poor Appearance] : well-appearing [Acute Distress] : not in acute distress [de-identified] : The patient has no respiratory distress. Mood and affect are normal. The patient is alert and oriented to person, place and time.\par Left lower extremity examination demonstrates minimal lateral tenderness at the left ankle.  The Achilles tendon is intact.  Ankle strength is good.  There are healing bruises on both lower extremities. [de-identified] : AP, lateral and mortise x-rays of the left ankle taken today demonstrate union of the lateral malleolus fracture

## 2021-10-01 NOTE — HISTORY OF PRESENT ILLNESS
[de-identified] : 79 y/o female returns for f/u left lateral malleolus fracture x 7/30/21. Pt reports very well today and says she is having no pain today. She says she has been elevating her ankle for the swelling to go down. She denies any numbness/tingling. Pt denies any new symptoms/injuries to L foot/ankle. Pt is currently wearing a short lace up brace and is ambulating with a cane for support. .

## 2021-10-01 NOTE — DISCUSSION/SUMMARY
[de-identified] : The patient's left ankle fracture has healed.  She will complete 1 month of physical therapy and resume normal activities.  She may wear normal shoes.  She will discontinue use of the lace up ankle brace.  She may use an elastic wrap for support.  She will return as needed.

## 2021-10-21 ENCOUNTER — APPOINTMENT (OUTPATIENT)
Dept: HEMATOLOGY ONCOLOGY | Facility: CLINIC | Age: 81
End: 2021-10-21
Payer: MEDICARE

## 2021-10-21 VITALS
HEART RATE: 96 BPM | WEIGHT: 142 LBS | BODY MASS INDEX: 25.97 KG/M2 | SYSTOLIC BLOOD PRESSURE: 140 MMHG | OXYGEN SATURATION: 97 % | DIASTOLIC BLOOD PRESSURE: 80 MMHG

## 2021-10-21 PROCEDURE — 85025 COMPLETE CBC W/AUTO DIFF WBC: CPT | Mod: GC

## 2021-10-21 PROCEDURE — 99214 OFFICE O/P EST MOD 30 MIN: CPT | Mod: GC

## 2021-10-21 RX ORDER — PREDNISONE 50 MG/1
50 TABLET ORAL
Qty: 40 | Refills: 0 | Status: COMPLETED | COMMUNITY
Start: 2021-06-10 | End: 2021-10-21

## 2021-10-21 NOTE — HISTORY OF PRESENT ILLNESS
[de-identified] : Ms. Magallon is a 79 yo female with PMHx of ANCA vasculitis (dx 20 years ago, been on Azathioprine since then, being followed by Rheum outpt) who presents today for initial consultation for newly Diagnosed Diffuse Large B cell Lymphoma. Pt noticed earlier this year that she had more SOB with activity and fatigue. She did not have night sweats, fever/chills or weight loss with this fatigue.\par Pt presented tot he ER on 4/13/21 with chest pain lasting and exertional shortness of breath. Patient reported recent travel to [place] in car (~10 hrs ride). She had a CT angio done which showed right lower lobe and middle lobe pulmonary arterial emboli. She was noted to have B/l LE DVTs - she was started on heparin and switched to Eliquis. Pt need to have chest tube placement as well and was d/c on O2 therapy. Pt states she used O2 for the first few days at home but does not currently use it. O2 stats at home have been in the 90s\par In addition pt was found to have large b/l lung masses measuring up to 8.6cm. \par Pulm was consulted and pt underwent biopsy/VATs on 4/16/21, path showed  EBV+ diffuse large B-cell lymphoma. Lymphoma cells are B cells, positive VEENA, CD20, CD79a, Pax5 (dim), Mum-1, Bcl2, CD43,variable CD30, negative CD5, CD10, CD23, cyclin D1, p53 (<5%), cMYC (<20%). Ki-67 proliferation index is high (>90%). CD3+ T cells comprise a minor population in the infiltrate.  stains few plasma cells; B cells are lambda-restricted. Cam 5.2, P40, TTF-1, synaptophysin, chromogranin, PAX-8 and MALU-3 were performed on block 2C. They are negative in lymphoma cells.\par \par She received Cycle 1 of R-CHOP on 5/20/21 while admitted to monitor her respiratory function. She had a LP with IT MTX which did not demonstrate involvement with lymphoma.  She received C2-C3 only complicated by fatigue. After C4, she became neutropenic and profoundly fatigued. She has a fall at home prior to presenting to [facility] for IVF hydration. At that time, platelets were 10K. She was sent ot the ER and admitted for cytopenias and new ankle fracture. She was discharged to rehab at [facility]. She received C5 of mini-R-CHOP given her prior complications which was overall well tolerated. \par \par \par  [de-identified] : S/p  Cycle 6 R-miniCHOP on 9/9/21:\par 10/21/21 -Appetite is good, pt is eating better. Weight stable.No abd pain. No N/V/D. No numbness/tingling. No CP. No palpitations. Her breathing has improved. No swelling. No bleeding/easy bruising. No dysuria. Constipation has improved with laxatives. She went to derm lat week for b/l lower leg wounds, which were cleaned and bandaged. She is continuing PT which is helping.

## 2021-10-21 NOTE — RESULTS/DATA
[FreeTextEntry1] : CBC 10/21/21: reviewed \par wbc - 4.5\par hgb - 12.3\par plt - 108\par anc -1.27\par alc - 2.78\par \par Interim PET/CT 6/25/21: \par IMPRESSION: Compared to FDG-PET/CT scan dated 5/10/2021:\par \par 1. Mildly FDG-avid bilateral lung nodules are decreased in size and metabolism, compatible with a partial response to interval therapy (Deauville 4). Resolution of mildly FDG-avid bilateral groundglass pulmonary opacities.\par \par 2. Few minimally FDG-avid mediastinal and bilateral hilar lymph nodes are similar in size and decreased in metabolism.\par \par 3. Resolution of FDG activity associated with right upper pole renal lesion which is better delineated on prior contrast-enhanced CT and interval MRI.\par \par 4. Resolution of FDG-avid left adrenal nodule.\par \par 5. Resolution of increased FDG activity in anterior aspect of left 9th rib.\par \par 6. Resolution of linear focus of increased FDG activity in lateral aspect of left chest wall.\par \par 7. Complete opacification of left maxillary sinus with mild, peripheral FDG avidity, unchanged. Correlate clinically for acute sinusitis.\par \par \par \par \par \par \par \par \par \par KIRILL MIR MD; Attending Nuclear Medicine\par This document has been electronically signed. Jun 28 2021  3:35PM\par \par  \par \par \par Final Diagnosis\par 1. 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 Suggest correlation with clinical\par and therapeutic history for underlying immune deficiency; test\par for EBV viral load is recommended.\par \par Microscopic description: Sections show dense lymphoid infiltrate\par with necrosis; predominantly medium-large sized cells forming\par larger clusters and sheets. Angiocentric and angiodestructive\par pattern is noted; confirmed by elastin stain on block 2B.\par Immunohistochemical stains for CD3, CD20, CD79a, Pax5, ,\par Bcl-2, Bcl-6, Mum-1, CD5, CD10, CD23, CD30, CD43, cyclin D1,\par cMYC, p53, and Ki-67, AE1/AE3,  in situ hybridization for\par Renay-Barr virus-\par -encoded small RNA (VEENA), kappa and lambda\par light chains were performed on block 2B. Lymphoma cells are B\par cells, positive VEENA, CD20, CD79a, Pax5 (dim), Mum-1, Bcl2, CD43,\par variable CD30, negative CD5, CD10, CD23, cyclin D1, p53 (<5%),\par cMYC (<20%). Ki-67 proliferation index is high (>90%). CD3+ T\par cells comprise a minor population in the infiltrate.  stains\par few plasma cells; B cells are lambda-restricted.\par Cam 5.2, P40, TTF-1, synaptohysing, chromogranin, PAX-8 and MALU-\par 3 were performed on block 2C. They are negative in lymphoma\par cells.\par \par

## 2021-10-21 NOTE — ASSESSMENT
[FreeTextEntry1] : Ms. Magallon is a 81 yo female with PMHx of  ANCA vasculitis (dx 20 years ago, treated with Azathioprine since then, being followed by Rheum outpt) who presents today for initial consultation for newly Diagnosed Diffuse Large B cell Lymphoma. Pt presented tot he ER on 4/13/21 with chest pain and exertional shortness of breath. Patient reported recent travel to [place] in car (~10 hrs ride). She had a CT angio done which showed right lower lobe and middle lobe pulmonary arterial emboli. She was noted to have B/l LE DVTs - she was started on heparin and switched to Eliquis. Pt need to have chest tube placement as well, discharged with home O2.\par \par In addition pt was found to have large b/l lung masses measuring up to 8.6cm. \par Pulm was consulted and pt underwent biopsy/VATs on 4/16/21, path showed  EBV+ diffuse large B-cell lymphoma. Lymphoma cells are B cells, positive VEENA, CD20, CD79a, Pax5 (dim), Mum-1, Bcl2, CD43,variable CD30, negative CD5, CD10, CD23, cyclin D1, p53 (<5%), cMYC (<20%). Ki-67 proliferation index is high (>90%). CD3+ T cells comprise a minor population in the infiltrate.  stains few plasma cells; B cells are lambda-restricted. \par \par Patient is s/p  CHOP on 5/19,  Rituxan and IT MTX on 5/20 at [facility]. She received Cycle 2 as outpatient. Cycle 4 was complicated by significant neutropenia, thrombocytopenia and fall which caused an ankle fracture. She was discharged form [facility] to [facility]. she left [facility] 1 weeks ago and is nearly independent with all IADL/ADls and is moving well. She received C5 fo R-miniCHOP which was overall well tolerated. She completed treatment on 9/9/21 and PET/CT demonstrated further response to treatment. \par \par #Diffuse Large B Cell Lymphoma EBV positive\par -PET/CT 5/10/21: Status post wedge resection of left upper lobe lung nodule, as compared to CT dated 4/13/2021. Additional FDG-avid, partially necrotic bilateral lung masses and mildly avid groundglass opacities are not significantly changed as compared to prior CT, compatible with biopsy-proven lymphoma. 2. Few mildly FDG-avid, mildly enlarged mediastinal and bilateral hilar lymph nodes are nonspecific. 3. Previously seen indeterminate 3.1 cm lesion in the upper pole of right kidney s FDG-avid. 4. FDG-avid left adrenal nodule is indeterminate. \par -MRI 5/17/21: Right upper pole renal lesion significantly decreased in size of uncertain etiology. A 1.3 cm left adrenal nodule remains indeterminate.\par -s/p C1 RCHOP with IT MTX on 5/19 and 5/20\par - Echo was done inpatient on 4/21/21 with an EF of 69%\par -s/p C3 RCHOP at [facility] on 7/1/2021\par -s/p C4 RCHOP on 7/22/21\par -received C5 of R-miniCHOP on 8/19/21 and C6 on 9/9/21\par -Interim PET/CT 6/25/21 shows no progression of disease and is responding to treatment. \par -EOT PET/CT done 9/27/21 shows Interval further decrease in size and FDG avidity of left upper lung nodule (Deauville 3) and resolution of mildly FDG avid right upper lobe nodule.\par -will check short interval scans to ensure progressive improvement in LAD\par -EBV level now undetectable, will check at each visit \par -mouth sores are resolved\par - valtrex refilled\par \par \par #PE/DVT\par - Pt is currently on Eliquis\par -would continue until 3/2022\par \par #Right Kidney lesion\par - Pt was noted to have a hypodense lesion on right upper pole on CT done 4/13/21\par - consider MRI after interim PET/CT for further characterization, suspect this could be related to DLBCL\par \par #ANCA positive vasculitis \par -hold azathioprine\par -discussed case with Dr. Romano- rituximab should maintain her remission and will monitor symptoms after completion of therapy \par -monitor for symptoms of recurrence (bone/back pain) \par \par Follow up in 6-8 weeks

## 2021-10-21 NOTE — PHYSICAL EXAM
[Restricted in physically strenuous activity but ambulatory and able to carry out work of a light or sedentary nature] : Status 1- Restricted in physically strenuous activity but ambulatory and able to carry out work of a light or sedentary nature, e.g., light house work, office work [Normal] : affect appropriate [de-identified] : left ankle boot [de-identified] : lesions on b/l LE - small, irregular shape, with erythema and scales

## 2021-10-21 NOTE — END OF VISIT
[Time Spent: ___ minutes] : I have spent [unfilled] minutes of time on the encounter. [FreeTextEntry3] : Patient seen and case discussed with MARGRET Man. I agree with above and have edited the note where needed.\par

## 2021-10-22 LAB
ALBUMIN SERPL ELPH-MCNC: 4 G/DL
ALP BLD-CCNC: 60 U/L
ALT SERPL-CCNC: 13 U/L
ANION GAP SERPL CALC-SCNC: 13 MMOL/L
AST SERPL-CCNC: 31 U/L
BILIRUB SERPL-MCNC: 0.4 MG/DL
BUN SERPL-MCNC: 16 MG/DL
CALCIUM SERPL-MCNC: 9.2 MG/DL
CHLORIDE SERPL-SCNC: 107 MMOL/L
CO2 SERPL-SCNC: 23 MMOL/L
COVID-19 SPIKE DOMAIN ANTIBODY INTERPRETATION: POSITIVE
CREAT SERPL-MCNC: 0.74 MG/DL
GLUCOSE SERPL-MCNC: 71 MG/DL
LDH SERPL-CCNC: 276 U/L
MAGNESIUM SERPL-MCNC: 1.9 MG/DL
PHOSPHATE SERPL-MCNC: 3.5 MG/DL
POTASSIUM SERPL-SCNC: 4.3 MMOL/L
PROT SERPL-MCNC: 6.1 G/DL
SARS-COV-2 AB SERPL IA-ACNC: 2.85 U/ML
SODIUM SERPL-SCNC: 143 MMOL/L
URATE SERPL-MCNC: 4.6 MG/DL

## 2021-10-25 LAB — EBV DNA SERPL NAA+PROBE-ACNC: NOT DETECTED IU/ML

## 2021-12-01 RX ORDER — FERROUS SULFATE TAB 325 MG (65 MG ELEMENTAL FE) 325 (65 FE) MG
325 (65 FE) TAB ORAL DAILY
Qty: 60 | Refills: 0 | Status: ACTIVE | COMMUNITY
Start: 2021-09-30 | End: 1900-01-01

## 2021-12-13 ENCOUNTER — APPOINTMENT (OUTPATIENT)
Dept: HEMATOLOGY ONCOLOGY | Facility: CLINIC | Age: 81
End: 2021-12-13
Payer: MEDICARE

## 2021-12-13 ENCOUNTER — RESULT REVIEW (OUTPATIENT)
Age: 81
End: 2021-12-13

## 2021-12-13 VITALS
DIASTOLIC BLOOD PRESSURE: 80 MMHG | WEIGHT: 140 LBS | BODY MASS INDEX: 25.61 KG/M2 | SYSTOLIC BLOOD PRESSURE: 150 MMHG | HEART RATE: 78 BPM | OXYGEN SATURATION: 99 %

## 2021-12-13 PROCEDURE — 99214 OFFICE O/P EST MOD 30 MIN: CPT

## 2021-12-13 NOTE — RESULTS/DATA
[FreeTextEntry1] : CBC 12/13/21: reviewed \par wbc - 5.4\par hgb - 13.9\par plt - 118\par anc -2.37\par alc - 2.46\par \par Interim PET/CT 6/25/21: \par IMPRESSION: Compared to FDG-PET/CT scan dated 5/10/2021:\par \par 1. Mildly FDG-avid bilateral lung nodules are decreased in size and metabolism, compatible with a partial response to interval therapy (Deauville 4). Resolution of mildly FDG-avid bilateral groundglass pulmonary opacities.\par \par 2. Few minimally FDG-avid mediastinal and bilateral hilar lymph nodes are similar in size and decreased in metabolism.\par \par 3. Resolution of FDG activity associated with right upper pole renal lesion which is better delineated on prior contrast-enhanced CT and interval MRI.\par \par 4. Resolution of FDG-avid left adrenal nodule.\par \par 5. Resolution of increased FDG activity in anterior aspect of left 9th rib.\par \par 6. Resolution of linear focus of increased FDG activity in lateral aspect of left chest wall.\par \par 7. Complete opacification of left maxillary sinus with mild, peripheral FDG avidity, unchanged. Correlate clinically for acute sinusitis.\par \par \par \par \par \par \par \par \par \par KIRILL MIR MD; Attending Nuclear Medicine\par This document has been electronically signed. Jun 28 2021  3:35PM\par \par  \par \par \par Final Diagnosis\par 1. 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 Suggest correlation with clinical\par and therapeutic history for underlying immune deficiency; test\par for EBV viral load is recommended.\par \par Microscopic description: Sections show dense lymphoid infiltrate\par with necrosis; predominantly medium-large sized cells forming\par larger clusters and sheets. Angiocentric and angiodestructive\par pattern is noted; confirmed by elastin stain on block 2B.\par Immunohistochemical stains for CD3, CD20, CD79a, Pax5, ,\par Bcl-2, Bcl-6, Mum-1, CD5, CD10, CD23, CD30, CD43, cyclin D1,\par cMYC, p53, and Ki-67, AE1/AE3,  in situ hybridization for\par Renay-Barr virus-\par -encoded small RNA (VEENA), kappa and lambda\par light chains were performed on block 2B. Lymphoma cells are B\par cells, positive VEENA, CD20, CD79a, Pax5 (dim), Mum-1, Bcl2, CD43,\par variable CD30, negative CD5, CD10, CD23, cyclin D1, p53 (<5%),\par cMYC (<20%). Ki-67 proliferation index is high (>90%). CD3+ T\par cells comprise a minor population in the infiltrate.  stains\par few plasma cells; B cells are lambda-restricted.\par Cam 5.2, P40, TTF-1, synaptohysing, chromogranin, PAX-8 and MALU-\par 3 were performed on block 2C. They are negative in lymphoma\par cells.\par \par

## 2021-12-13 NOTE — HISTORY OF PRESENT ILLNESS
[de-identified] : Ms. Magallon is a 79 yo female with PMHx of ANCA vasculitis (dx 20 years ago, been on Azathioprine since then, being followed by Rheum outpt) who presents today for initial consultation for newly Diagnosed Diffuse Large B cell Lymphoma. Pt noticed earlier this year that she had more SOB with activity and fatigue. She did not have night sweats, fever/chills or weight loss with this fatigue.\par Pt presented tot he ER on 4/13/21 with chest pain lasting and exertional shortness of breath. Patient reported recent travel to [place] in car (~10 hrs ride). She had a CT angio done which showed right lower lobe and middle lobe pulmonary arterial emboli. She was noted to have B/l LE DVTs - she was started on heparin and switched to Eliquis. Pt need to have chest tube placement as well and was d/c on O2 therapy. Pt states she used O2 for the first few days at home but does not currently use it. O2 stats at home have been in the 90s\par In addition pt was found to have large b/l lung masses measuring up to 8.6cm. \par Pulm was consulted and pt underwent biopsy/VATs on 4/16/21, path showed  EBV+ diffuse large B-cell lymphoma. Lymphoma cells are B cells, positive VEENA, CD20, CD79a, Pax5 (dim), Mum-1, Bcl2, CD43,variable CD30, negative CD5, CD10, CD23, cyclin D1, p53 (<5%), cMYC (<20%). Ki-67 proliferation index is high (>90%). CD3+ T cells comprise a minor population in the infiltrate.  stains few plasma cells; B cells are lambda-restricted. Cam 5.2, P40, TTF-1, synaptophysin, chromogranin, PAX-8 and MALU-3 were performed on block 2C. They are negative in lymphoma cells.\par \par She received Cycle 1 of R-CHOP on 5/20/21 while admitted to monitor her respiratory function. She had a LP with IT MTX which did not demonstrate involvement with lymphoma.  She received C2-C3 only complicated by fatigue. After C4, she became neutropenic and profoundly fatigued. She has a fall at home prior to presenting to [facility] for IVF hydration. At that time, platelets were 10K. She was sent ot the ER and admitted for cytopenias and new ankle fracture. She was discharged to rehab at [facility]. She received C5 of mini-R-CHOP given her prior complications which was overall well tolerated. \par \par \par  [de-identified] : S/p  Cycle 6 R-miniCHOP on 9/9/21:\par 12/13/21 -Appetite is good, pt is eating better. Weight stable.No abd pain. No N/V/D. No numbness/tingling. No CP. No palpitations. Her breathing has improved. No swelling. No bleeding/easy bruising. No dysuria. Pt has completed PT. She noticed intermitted left >right lower leg cramping at night which is alleviated with standing. She has continued follow up with Derm \par  She is planning on going to [place] starting 12/18/21 -4/2022

## 2021-12-13 NOTE — PHYSICAL EXAM
[Restricted in physically strenuous activity but ambulatory and able to carry out work of a light or sedentary nature] : Status 1- Restricted in physically strenuous activity but ambulatory and able to carry out work of a light or sedentary nature, e.g., light house work, office work [Normal] : full range of motion and no deformities appreciated [de-identified] : lesions on b/l LE - small, irregular shape, with erythema and scales - stable, she is following up with Derm

## 2021-12-13 NOTE — ASSESSMENT
[FreeTextEntry1] : Ms. Magallon is a 81 yo female with PMHx of  ANCA vasculitis (dx 20 years ago, treated with Azathioprine since then, being followed by Rheum outpt) who presents today for initial consultation for newly Diagnosed Diffuse Large B cell Lymphoma. Pt presented tot he ER on 4/13/21 with chest pain and exertional shortness of breath. Patient reported recent travel to [place] in car (~10 hrs ride). She had a CT angio done which showed right lower lobe and middle lobe pulmonary arterial emboli. She was noted to have B/l LE DVTs - she was started on heparin and switched to Eliquis. Pt need to have chest tube placement as well, discharged with home O2.\par \par In addition pt was found to have large b/l lung masses measuring up to 8.6cm. \par Pulm was consulted and pt underwent biopsy/VATs on 4/16/21, path showed  EBV+ diffuse large B-cell lymphoma. Lymphoma cells are B cells, positive VEENA, CD20, CD79a, Pax5 (dim), Mum-1, Bcl2, CD43,variable CD30, negative CD5, CD10, CD23, cyclin D1, p53 (<5%), cMYC (<20%). Ki-67 proliferation index is high (>90%). CD3+ T cells comprise a minor population in the infiltrate.  stains few plasma cells; B cells are lambda-restricted. \par \par Patient is s/p  CHOP on 5/19,  Rituxan and IT MTX on 5/20 at [facility]. She received Cycle 2 as outpatient. Cycle 4 was complicated by significant neutropenia, thrombocytopenia and fall which caused an ankle fracture. She was discharged form [facility] to [facility]. she left [facility] 1 weeks ago and is nearly independent with all IADL/ADls and is moving well. She received C5 fo R-miniCHOP which was overall well tolerated. She completed treatment on 9/9/21 and PET/CT demonstrated further response to treatment. \par \par #Diffuse Large B Cell Lymphoma EBV positive\par -PET/CT 5/10/21: Status post wedge resection of left upper lobe lung nodule, as compared to CT dated 4/13/2021. Additional FDG-avid, partially necrotic bilateral lung masses and mildly avid groundglass opacities are not significantly changed as compared to prior CT, compatible with biopsy-proven lymphoma. 2. Few mildly FDG-avid, mildly enlarged mediastinal and bilateral hilar lymph nodes are nonspecific. 3. Previously seen indeterminate 3.1 cm lesion in the upper pole of right kidney s FDG-avid. 4. FDG-avid left adrenal nodule is indeterminate. \par -MRI 5/17/21: Right upper pole renal lesion significantly decreased in size of uncertain etiology. A 1.3 cm left adrenal nodule remains indeterminate.\par -s/p C1 RCHOP with IT MTX on 5/19 and 5/20\par - Echo was done inpatient on 4/21/21 with an EF of 69%\par -s/p C3 RCHOP at [facility] on 7/1/2021\par -s/p C4 RCHOP on 7/22/21\par -received C5 of R-miniCHOP on 8/19/21 and C6 on 9/9/21\par -Interim PET/CT 6/25/21 shows no progression of disease and is responding to treatment. \par -EOT PET/CT done 9/27/21 shows Interval further decrease in size and FDG avidity of left upper lung nodule (Deauville 3) and resolution of mildly FDG avid right upper lobe nodule.\par -will check short interval scans to ensure progressive improvement in LAD - will schedule for this week as pt is leaving for [place] on 12/18/21 and returning 4/2022\par -Needs port flush appt- will scheudle for this week\par -b/l lower leg cramping - will send for b/l LE doppler\par -EBV level now undetectable, will check at each visit \par -mouth sores are resolved\par \par \par \par #PE/DVT\par - Pt is currently on Eliquis\par -would continue until 3/2022\par \par #Right Kidney lesion\par - Pt was noted to have a hypodense lesion on right upper pole on CT done 4/13/21\par - consider MRI after interim PET/CT for further characterization, suspect this could be related to DLBCL\par \par #ANCA positive vasculitis \par -hold azathioprine\par -discussed case with Dr. Romano- rituximab should maintain her remission and will monitor symptoms after completion of therapy \par -monitor for symptoms of recurrence (bone/back pain) \par \par Follow up in 4/2022 - will have pt establish care with a [place] hematologist for port flush

## 2021-12-14 ENCOUNTER — RESULT REVIEW (OUTPATIENT)
Age: 81
End: 2021-12-14

## 2021-12-14 ENCOUNTER — APPOINTMENT (OUTPATIENT)
Dept: ULTRASOUND IMAGING | Facility: CLINIC | Age: 81
End: 2021-12-14
Payer: MEDICARE

## 2021-12-14 ENCOUNTER — OUTPATIENT (OUTPATIENT)
Dept: OUTPATIENT SERVICES | Facility: HOSPITAL | Age: 81
LOS: 1 days | End: 2021-12-14
Payer: MEDICARE

## 2021-12-14 DIAGNOSIS — Z90.49 ACQUIRED ABSENCE OF OTHER SPECIFIED PARTS OF DIGESTIVE TRACT: Chronic | ICD-10-CM

## 2021-12-14 DIAGNOSIS — C83.30 DIFFUSE LARGE B-CELL LYMPHOMA, UNSPECIFIED SITE: ICD-10-CM

## 2021-12-14 DIAGNOSIS — C85.90 NON-HODGKIN LYMPHOMA, UNSPECIFIED, UNSPECIFIED SITE: ICD-10-CM

## 2021-12-14 LAB
ALBUMIN SERPL ELPH-MCNC: 4.1 G/DL
ALP BLD-CCNC: 77 U/L
ALT SERPL-CCNC: 13 U/L
ANION GAP SERPL CALC-SCNC: 10 MMOL/L
AST SERPL-CCNC: 33 U/L
BILIRUB SERPL-MCNC: 0.3 MG/DL
BUN SERPL-MCNC: 18 MG/DL
CALCIUM SERPL-MCNC: 9.4 MG/DL
CHLORIDE SERPL-SCNC: 108 MMOL/L
CO2 SERPL-SCNC: 27 MMOL/L
COVID-19 SPIKE DOMAIN ANTIBODY INTERPRETATION: POSITIVE
CREAT SERPL-MCNC: 0.76 MG/DL
GLUCOSE SERPL-MCNC: 72 MG/DL
LDH SERPL-CCNC: 321 U/L
MAGNESIUM SERPL-MCNC: 2 MG/DL
PHOSPHATE SERPL-MCNC: 2.9 MG/DL
POTASSIUM SERPL-SCNC: 4.7 MMOL/L
PROT SERPL-MCNC: 6.2 G/DL
SARS-COV-2 AB SERPL IA-ACNC: 0.86 U/ML
SODIUM SERPL-SCNC: 144 MMOL/L
URATE SERPL-MCNC: 4.6 MG/DL

## 2021-12-14 PROCEDURE — 93970 EXTREMITY STUDY: CPT

## 2021-12-14 PROCEDURE — 93970 EXTREMITY STUDY: CPT | Mod: 26

## 2021-12-15 ENCOUNTER — OUTPATIENT (OUTPATIENT)
Dept: OUTPATIENT SERVICES | Facility: HOSPITAL | Age: 81
LOS: 1 days | Discharge: HOME | End: 2021-12-15

## 2021-12-15 DIAGNOSIS — Z90.49 ACQUIRED ABSENCE OF OTHER SPECIFIED PARTS OF DIGESTIVE TRACT: Chronic | ICD-10-CM

## 2021-12-15 DIAGNOSIS — C83.30 DIFFUSE LARGE B-CELL LYMPHOMA, UNSPECIFIED SITE: ICD-10-CM

## 2021-12-16 ENCOUNTER — OUTPATIENT (OUTPATIENT)
Dept: OUTPATIENT SERVICES | Facility: HOSPITAL | Age: 81
LOS: 1 days | End: 2021-12-16
Payer: MEDICARE

## 2021-12-16 ENCOUNTER — APPOINTMENT (OUTPATIENT)
Dept: CT IMAGING | Facility: IMAGING CENTER | Age: 81
End: 2021-12-16
Payer: MEDICARE

## 2021-12-16 ENCOUNTER — APPOINTMENT (OUTPATIENT)
Dept: CT IMAGING | Facility: IMAGING CENTER | Age: 81
End: 2021-12-16

## 2021-12-16 ENCOUNTER — APPOINTMENT (OUTPATIENT)
Dept: HEMATOLOGY ONCOLOGY | Facility: CLINIC | Age: 81
End: 2021-12-16

## 2021-12-16 DIAGNOSIS — Z90.49 ACQUIRED ABSENCE OF OTHER SPECIFIED PARTS OF DIGESTIVE TRACT: Chronic | ICD-10-CM

## 2021-12-16 DIAGNOSIS — C83.30 DIFFUSE LARGE B-CELL LYMPHOMA, UNSPECIFIED SITE: ICD-10-CM

## 2021-12-16 LAB — EBV DNA SERPL NAA+PROBE-ACNC: NOT DETECTED IU/ML

## 2021-12-16 PROCEDURE — 71260 CT THORAX DX C+: CPT | Mod: MG

## 2021-12-16 PROCEDURE — G1004: CPT

## 2021-12-16 PROCEDURE — 74177 CT ABD & PELVIS W/CONTRAST: CPT | Mod: 26,ME

## 2021-12-16 PROCEDURE — 74177 CT ABD & PELVIS W/CONTRAST: CPT | Mod: ME

## 2021-12-16 PROCEDURE — 71260 CT THORAX DX C+: CPT | Mod: 26,MG

## 2021-12-16 PROCEDURE — 70491 CT SOFT TISSUE NECK W/DYE: CPT | Mod: 26,MG

## 2021-12-16 PROCEDURE — 70491 CT SOFT TISSUE NECK W/DYE: CPT | Mod: MG

## 2021-12-17 ENCOUNTER — APPOINTMENT (OUTPATIENT)
Dept: INFUSION THERAPY | Facility: HOSPITAL | Age: 81
End: 2021-12-17

## 2022-01-01 ENCOUNTER — APPOINTMENT (OUTPATIENT)
Dept: NUCLEAR MEDICINE | Facility: IMAGING CENTER | Age: 82
End: 2022-01-01

## 2022-01-01 ENCOUNTER — RESULT REVIEW (OUTPATIENT)
Age: 82
End: 2022-01-01

## 2022-01-01 ENCOUNTER — INPATIENT (INPATIENT)
Facility: HOSPITAL | Age: 82
LOS: 4 days | Discharge: HOME | End: 2022-10-05
Attending: HOSPITALIST | Admitting: HOSPITALIST

## 2022-01-01 ENCOUNTER — TRANSCRIPTION ENCOUNTER (OUTPATIENT)
Age: 82
End: 2022-01-01

## 2022-01-01 ENCOUNTER — APPOINTMENT (OUTPATIENT)
Dept: INTERVENTIONAL RADIOLOGY/VASCULAR | Facility: CLINIC | Age: 82
End: 2022-01-01
Payer: MEDICARE

## 2022-01-01 ENCOUNTER — NON-APPOINTMENT (OUTPATIENT)
Age: 82
End: 2022-01-01

## 2022-01-01 ENCOUNTER — APPOINTMENT (OUTPATIENT)
Dept: THORACIC SURGERY | Facility: HOSPITAL | Age: 82
End: 2022-01-01

## 2022-01-01 ENCOUNTER — APPOINTMENT (OUTPATIENT)
Dept: HEMATOLOGY ONCOLOGY | Facility: CLINIC | Age: 82
End: 2022-01-01

## 2022-01-01 ENCOUNTER — OUTPATIENT (OUTPATIENT)
Dept: OUTPATIENT SERVICES | Facility: HOSPITAL | Age: 82
LOS: 1 days | Discharge: HOME | End: 2022-01-01

## 2022-01-01 ENCOUNTER — OUTPATIENT (OUTPATIENT)
Dept: OUTPATIENT SERVICES | Facility: HOSPITAL | Age: 82
LOS: 1 days | End: 2022-01-01
Payer: MEDICARE

## 2022-01-01 ENCOUNTER — APPOINTMENT (OUTPATIENT)
Dept: HEMATOLOGY ONCOLOGY | Facility: CLINIC | Age: 82
End: 2022-01-01
Payer: MEDICARE

## 2022-01-01 ENCOUNTER — APPOINTMENT (OUTPATIENT)
Dept: INFUSION THERAPY | Facility: HOSPITAL | Age: 82
End: 2022-01-01

## 2022-01-01 ENCOUNTER — APPOINTMENT (OUTPATIENT)
Dept: PULMONOLOGY | Facility: CLINIC | Age: 82
End: 2022-01-01

## 2022-01-01 ENCOUNTER — APPOINTMENT (OUTPATIENT)
Dept: CV DIAGNOSITCS | Facility: HOSPITAL | Age: 82
End: 2022-01-01

## 2022-01-01 ENCOUNTER — LABORATORY RESULT (OUTPATIENT)
Age: 82
End: 2022-01-01

## 2022-01-01 ENCOUNTER — OUTPATIENT (OUTPATIENT)
Dept: OUTPATIENT SERVICES | Facility: HOSPITAL | Age: 82
LOS: 1 days | End: 2022-01-01

## 2022-01-01 ENCOUNTER — APPOINTMENT (OUTPATIENT)
Dept: THORACIC SURGERY | Facility: CLINIC | Age: 82
End: 2022-01-01

## 2022-01-01 ENCOUNTER — APPOINTMENT (OUTPATIENT)
Dept: CT IMAGING | Facility: IMAGING CENTER | Age: 82
End: 2022-01-01

## 2022-01-01 ENCOUNTER — APPOINTMENT (OUTPATIENT)
Dept: CARDIOLOGY | Facility: CLINIC | Age: 82
End: 2022-01-01

## 2022-01-01 ENCOUNTER — APPOINTMENT (OUTPATIENT)
Dept: CT IMAGING | Facility: IMAGING CENTER | Age: 82
End: 2022-01-01
Payer: MEDICARE

## 2022-01-01 ENCOUNTER — INPATIENT (INPATIENT)
Facility: HOSPITAL | Age: 82
LOS: 24 days | Discharge: NURSING, REHAB OR LONG-TERM CARE | End: 2022-12-04
Attending: HOSPITALIST | Admitting: HOSPITALIST
Payer: MEDICARE

## 2022-01-01 VITALS
RESPIRATION RATE: 18 BRPM | OXYGEN SATURATION: 97 % | TEMPERATURE: 98 F | HEART RATE: 92 BPM | SYSTOLIC BLOOD PRESSURE: 131 MMHG | DIASTOLIC BLOOD PRESSURE: 41 MMHG

## 2022-01-01 VITALS
HEART RATE: 87 BPM | TEMPERATURE: 97.2 F | SYSTOLIC BLOOD PRESSURE: 110 MMHG | OXYGEN SATURATION: 97 % | DIASTOLIC BLOOD PRESSURE: 70 MMHG

## 2022-01-01 VITALS
HEART RATE: 75 BPM | WEIGHT: 145 LBS | TEMPERATURE: 97.5 F | OXYGEN SATURATION: 98 % | BODY MASS INDEX: 26.52 KG/M2 | SYSTOLIC BLOOD PRESSURE: 122 MMHG | DIASTOLIC BLOOD PRESSURE: 70 MMHG

## 2022-01-01 VITALS — OXYGEN SATURATION: 97 % | DIASTOLIC BLOOD PRESSURE: 60 MMHG | HEART RATE: 107 BPM | SYSTOLIC BLOOD PRESSURE: 130 MMHG

## 2022-01-01 VITALS
WEIGHT: 128.09 LBS | SYSTOLIC BLOOD PRESSURE: 130 MMHG | HEIGHT: 59 IN | TEMPERATURE: 100 F | HEART RATE: 120 BPM | OXYGEN SATURATION: 98 % | RESPIRATION RATE: 18 BRPM | DIASTOLIC BLOOD PRESSURE: 80 MMHG

## 2022-01-01 VITALS
DIASTOLIC BLOOD PRESSURE: 83 MMHG | BODY MASS INDEX: 25.76 KG/M2 | HEIGHT: 62 IN | OXYGEN SATURATION: 99 % | HEART RATE: 114 BPM | SYSTOLIC BLOOD PRESSURE: 138 MMHG | WEIGHT: 140 LBS

## 2022-01-01 VITALS
DIASTOLIC BLOOD PRESSURE: 43 MMHG | OXYGEN SATURATION: 95 % | HEART RATE: 80 BPM | TEMPERATURE: 98 F | RESPIRATION RATE: 18 BRPM | SYSTOLIC BLOOD PRESSURE: 126 MMHG

## 2022-01-01 VITALS
HEART RATE: 61 BPM | DIASTOLIC BLOOD PRESSURE: 63 MMHG | SYSTOLIC BLOOD PRESSURE: 131 MMHG | TEMPERATURE: 99 F | OXYGEN SATURATION: 96 % | RESPIRATION RATE: 16 BRPM

## 2022-01-01 VITALS
TEMPERATURE: 97.4 F | SYSTOLIC BLOOD PRESSURE: 140 MMHG | HEART RATE: 84 BPM | OXYGEN SATURATION: 99 % | DIASTOLIC BLOOD PRESSURE: 72 MMHG | RESPIRATION RATE: 15 BRPM

## 2022-01-01 VITALS
TEMPERATURE: 97.4 F | RESPIRATION RATE: 15 BRPM | SYSTOLIC BLOOD PRESSURE: 103 MMHG | DIASTOLIC BLOOD PRESSURE: 67 MMHG | HEART RATE: 118 BPM | OXYGEN SATURATION: 97 %

## 2022-01-01 VITALS
HEIGHT: 61 IN | OXYGEN SATURATION: 96 % | HEART RATE: 94 BPM | SYSTOLIC BLOOD PRESSURE: 132 MMHG | RESPIRATION RATE: 16 BRPM | TEMPERATURE: 98 F | DIASTOLIC BLOOD PRESSURE: 50 MMHG

## 2022-01-01 VITALS
BODY MASS INDEX: 25.76 KG/M2 | OXYGEN SATURATION: 97 % | WEIGHT: 140 LBS | DIASTOLIC BLOOD PRESSURE: 78 MMHG | HEIGHT: 62 IN | HEART RATE: 121 BPM | SYSTOLIC BLOOD PRESSURE: 140 MMHG

## 2022-01-01 VITALS
BODY MASS INDEX: 23.95 KG/M2 | OXYGEN SATURATION: 98 % | DIASTOLIC BLOOD PRESSURE: 63 MMHG | WEIGHT: 122 LBS | TEMPERATURE: 97.2 F | HEIGHT: 60 IN | HEART RATE: 120 BPM | SYSTOLIC BLOOD PRESSURE: 96 MMHG

## 2022-01-01 VITALS — BODY MASS INDEX: 25.79 KG/M2 | WEIGHT: 141 LBS

## 2022-01-01 VITALS — HEIGHT: 60 IN | WEIGHT: 140 LBS | BODY MASS INDEX: 27.48 KG/M2

## 2022-01-01 DIAGNOSIS — C83.30 DIFFUSE LARGE B-CELL LYMPHOMA, UNSPECIFIED SITE: ICD-10-CM

## 2022-01-01 DIAGNOSIS — R91.1 SOLITARY PULMONARY NODULE: ICD-10-CM

## 2022-01-01 DIAGNOSIS — I05.0 RHEUMATIC MITRAL STENOSIS: ICD-10-CM

## 2022-01-01 DIAGNOSIS — R91.1 SOLITARY PULMONARY NODULE: Chronic | ICD-10-CM

## 2022-01-01 DIAGNOSIS — C85.90 NON-HODGKIN LYMPHOMA, UNSPECIFIED, UNSPECIFIED SITE: ICD-10-CM

## 2022-01-01 DIAGNOSIS — Z86.79 PERSONAL HISTORY OF OTHER DISEASES OF THE CIRCULATORY SYSTEM: ICD-10-CM

## 2022-01-01 DIAGNOSIS — I80.229 PHLEBITIS AND THROMBOPHLEBITIS OF UNSPECIFIED POPLITEAL VEIN: ICD-10-CM

## 2022-01-01 DIAGNOSIS — C85.90 NON-HODGKIN LYMPHOMA, UNSPECIFIED, UNSPECIFIED SITE: Chronic | ICD-10-CM

## 2022-01-01 DIAGNOSIS — R79.89 OTHER SPECIFIED ABNORMAL FINDINGS OF BLOOD CHEMISTRY: ICD-10-CM

## 2022-01-01 DIAGNOSIS — E78.5 HYPERLIPIDEMIA, UNSPECIFIED: ICD-10-CM

## 2022-01-01 DIAGNOSIS — Z90.49 ACQUIRED ABSENCE OF OTHER SPECIFIED PARTS OF DIGESTIVE TRACT: Chronic | ICD-10-CM

## 2022-01-01 DIAGNOSIS — D64.9 ANEMIA, UNSPECIFIED: ICD-10-CM

## 2022-01-01 DIAGNOSIS — Z86.718 PERSONAL HISTORY OF OTHER VENOUS THROMBOSIS AND EMBOLISM: ICD-10-CM

## 2022-01-01 DIAGNOSIS — R91.8 OTHER NONSPECIFIC ABNORMAL FINDING OF LUNG FIELD: ICD-10-CM

## 2022-01-01 DIAGNOSIS — Z01.818 ENCOUNTER FOR OTHER PREPROCEDURAL EXAMINATION: ICD-10-CM

## 2022-01-01 DIAGNOSIS — E86.0 DEHYDRATION: ICD-10-CM

## 2022-01-01 DIAGNOSIS — R06.02 SHORTNESS OF BREATH: ICD-10-CM

## 2022-01-01 DIAGNOSIS — I26.99 OTHER PULMONARY EMBOLISM WITHOUT ACUTE COR PULMONALE: ICD-10-CM

## 2022-01-01 DIAGNOSIS — R06.09 OTHER FORMS OF DYSPNEA: ICD-10-CM

## 2022-01-01 DIAGNOSIS — R03.0 ELEVATED BLOOD-PRESSURE READING, WITHOUT DIAGNOSIS OF HYPERTENSION: ICD-10-CM

## 2022-01-01 DIAGNOSIS — K21.9 GASTRO-ESOPHAGEAL REFLUX DISEASE WITHOUT ESOPHAGITIS: ICD-10-CM

## 2022-01-01 DIAGNOSIS — F41.9 ANXIETY DISORDER, UNSPECIFIED: ICD-10-CM

## 2022-01-01 DIAGNOSIS — B27.09 DIFFUSE LARGE B-CELL LYMPHOMA, UNSPECIFIED SITE: ICD-10-CM

## 2022-01-01 DIAGNOSIS — Z01.810 ENCOUNTER FOR PREPROCEDURAL CARDIOVASCULAR EXAMINATION: ICD-10-CM

## 2022-01-01 DIAGNOSIS — Z13.6 ENCOUNTER FOR SCREENING FOR CARDIOVASCULAR DISORDERS: ICD-10-CM

## 2022-01-01 LAB
24R-OH-CALCIDIOL SERPL-MCNC: 38.3 NG/ML — SIGNIFICANT CHANGE UP (ref 30–80)
4/8 RATIO: 0.45 RATIO — LOW (ref 0.86–4.14)
ABS CD8: 978 /UL — HIGH (ref 90–775)
ALBUMIN FLD-MCNC: 1.5 G/DL — SIGNIFICANT CHANGE UP
ALBUMIN SERPL ELPH-MCNC: 2.3 G/DL — LOW (ref 3.3–5)
ALBUMIN SERPL ELPH-MCNC: 2.4 G/DL — LOW (ref 3.3–5)
ALBUMIN SERPL ELPH-MCNC: 2.5 G/DL — LOW (ref 3.3–5)
ALBUMIN SERPL ELPH-MCNC: 2.5 G/DL — LOW (ref 3.3–5)
ALBUMIN SERPL ELPH-MCNC: 2.6 G/DL — LOW (ref 3.3–5)
ALBUMIN SERPL ELPH-MCNC: 2.6 G/DL — LOW (ref 3.3–5)
ALBUMIN SERPL ELPH-MCNC: 2.7 G/DL — LOW (ref 3.3–5)
ALBUMIN SERPL ELPH-MCNC: 2.7 G/DL — LOW (ref 3.3–5)
ALBUMIN SERPL ELPH-MCNC: 2.8 G/DL
ALBUMIN SERPL ELPH-MCNC: 2.8 G/DL — LOW (ref 3.3–5)
ALBUMIN SERPL ELPH-MCNC: 2.8 G/DL — LOW (ref 3.3–5)
ALBUMIN SERPL ELPH-MCNC: 3 G/DL — LOW (ref 3.3–5)
ALBUMIN SERPL ELPH-MCNC: 3.2 G/DL — LOW (ref 3.3–5)
ALBUMIN SERPL ELPH-MCNC: 3.2 G/DL — LOW (ref 3.3–5)
ALBUMIN SERPL ELPH-MCNC: 3.4 G/DL
ALBUMIN SERPL ELPH-MCNC: 4 G/DL
ALBUMIN SERPL ELPH-MCNC: 4.1 G/DL
ALP BLD-CCNC: 59 U/L
ALP BLD-CCNC: 69 U/L
ALP BLD-CCNC: 69 U/L
ALP BLD-CCNC: 70 U/L
ALP SERPL-CCNC: 51 U/L — SIGNIFICANT CHANGE UP (ref 40–120)
ALP SERPL-CCNC: 55 U/L — SIGNIFICANT CHANGE UP (ref 40–120)
ALP SERPL-CCNC: 56 U/L — SIGNIFICANT CHANGE UP (ref 40–120)
ALP SERPL-CCNC: 57 U/L — SIGNIFICANT CHANGE UP (ref 40–120)
ALP SERPL-CCNC: 59 U/L — SIGNIFICANT CHANGE UP (ref 40–120)
ALP SERPL-CCNC: 60 U/L — SIGNIFICANT CHANGE UP (ref 40–120)
ALP SERPL-CCNC: 61 U/L — SIGNIFICANT CHANGE UP (ref 40–120)
ALP SERPL-CCNC: 64 U/L — SIGNIFICANT CHANGE UP (ref 40–120)
ALP SERPL-CCNC: 71 U/L — SIGNIFICANT CHANGE UP (ref 40–120)
ALP SERPL-CCNC: 73 U/L — SIGNIFICANT CHANGE UP (ref 40–120)
ALP SERPL-CCNC: 73 U/L — SIGNIFICANT CHANGE UP (ref 40–120)
ALP SERPL-CCNC: 74 U/L — SIGNIFICANT CHANGE UP (ref 40–120)
ALP SERPL-CCNC: 75 U/L — SIGNIFICANT CHANGE UP (ref 40–120)
ALP SERPL-CCNC: 79 U/L — SIGNIFICANT CHANGE UP (ref 40–120)
ALP SERPL-CCNC: 80 U/L — SIGNIFICANT CHANGE UP (ref 40–120)
ALP SERPL-CCNC: 89 U/L — SIGNIFICANT CHANGE UP (ref 40–120)
ALT FLD-CCNC: 10 U/L — SIGNIFICANT CHANGE UP (ref 4–33)
ALT FLD-CCNC: 5 U/L — SIGNIFICANT CHANGE UP (ref 4–33)
ALT FLD-CCNC: 5 U/L — SIGNIFICANT CHANGE UP (ref 4–33)
ALT FLD-CCNC: 6 U/L — SIGNIFICANT CHANGE UP (ref 4–33)
ALT FLD-CCNC: 7 U/L — SIGNIFICANT CHANGE UP (ref 4–33)
ALT FLD-CCNC: 8 U/L — SIGNIFICANT CHANGE UP (ref 4–33)
ALT FLD-CCNC: <5 U/L — LOW (ref 4–33)
ALT FLD-CCNC: <5 U/L — LOW (ref 4–33)
ALT FLD-CCNC: <5 U/L — SIGNIFICANT CHANGE UP (ref 4–33)
ALT FLD-CCNC: <5 U/L — SIGNIFICANT CHANGE UP (ref 4–33)
ALT SERPL-CCNC: 10 U/L
ALT SERPL-CCNC: 15 U/L
ALT SERPL-CCNC: 9 U/L
ALT SERPL-CCNC: 9 U/L
ANION GAP SERPL CALC-SCNC: 10 MMOL/L — SIGNIFICANT CHANGE UP (ref 7–14)
ANION GAP SERPL CALC-SCNC: 11 MMOL/L — SIGNIFICANT CHANGE UP (ref 7–14)
ANION GAP SERPL CALC-SCNC: 12 MMOL/L
ANION GAP SERPL CALC-SCNC: 12 MMOL/L — SIGNIFICANT CHANGE UP (ref 7–14)
ANION GAP SERPL CALC-SCNC: 13 MMOL/L
ANION GAP SERPL CALC-SCNC: 13 MMOL/L
ANION GAP SERPL CALC-SCNC: 13 MMOL/L — SIGNIFICANT CHANGE UP (ref 7–14)
ANION GAP SERPL CALC-SCNC: 14 MMOL/L — SIGNIFICANT CHANGE UP (ref 7–14)
ANION GAP SERPL CALC-SCNC: 15 MMOL/L
ANION GAP SERPL CALC-SCNC: 8 MMOL/L — SIGNIFICANT CHANGE UP (ref 7–14)
ANION GAP SERPL CALC-SCNC: 9 MMOL/L — SIGNIFICANT CHANGE UP (ref 7–14)
ANISOCYTOSIS BLD QL: SLIGHT — SIGNIFICANT CHANGE UP
APPEARANCE UR: ABNORMAL
APPEARANCE UR: CLEAR — SIGNIFICANT CHANGE UP
APPEARANCE UR: CLEAR — SIGNIFICANT CHANGE UP
APTT BLD: 31.6 SEC — SIGNIFICANT CHANGE UP (ref 27–36.3)
APTT BLD: 32.6 SEC — SIGNIFICANT CHANGE UP (ref 27–36.3)
APTT BLD: 34.8 SEC — SIGNIFICANT CHANGE UP (ref 27–36.3)
APTT BLD: 36.9 SEC — HIGH (ref 27–36.3)
APTT BLD: 38.7 SEC — HIGH (ref 27–36.3)
AST SERPL-CCNC: 11 U/L — SIGNIFICANT CHANGE UP (ref 4–32)
AST SERPL-CCNC: 12 U/L — SIGNIFICANT CHANGE UP (ref 4–32)
AST SERPL-CCNC: 14 U/L — SIGNIFICANT CHANGE UP (ref 4–32)
AST SERPL-CCNC: 15 U/L — SIGNIFICANT CHANGE UP (ref 4–32)
AST SERPL-CCNC: 15 U/L — SIGNIFICANT CHANGE UP (ref 4–32)
AST SERPL-CCNC: 16 U/L — SIGNIFICANT CHANGE UP (ref 4–32)
AST SERPL-CCNC: 16 U/L — SIGNIFICANT CHANGE UP (ref 4–32)
AST SERPL-CCNC: 18 U/L
AST SERPL-CCNC: 18 U/L — SIGNIFICANT CHANGE UP (ref 4–32)
AST SERPL-CCNC: 19 U/L — SIGNIFICANT CHANGE UP (ref 4–32)
AST SERPL-CCNC: 19 U/L — SIGNIFICANT CHANGE UP (ref 4–32)
AST SERPL-CCNC: 20 U/L
AST SERPL-CCNC: 20 U/L — SIGNIFICANT CHANGE UP (ref 4–32)
AST SERPL-CCNC: 22 U/L — SIGNIFICANT CHANGE UP (ref 4–32)
AST SERPL-CCNC: 29 U/L
AST SERPL-CCNC: 32 U/L
AST SERPL-CCNC: 8 U/L — SIGNIFICANT CHANGE UP (ref 4–32)
AUTO DIFF PNL BLD: ABNORMAL
B PERT DNA SPEC QL NAA+PROBE: SIGNIFICANT CHANGE UP
B PERT DNA SPEC QL NAA+PROBE: SIGNIFICANT CHANGE UP
B PERT IGG+IGM PNL SER: ABNORMAL
B PERT+PARAPERT DNA PNL SPEC NAA+PROBE: SIGNIFICANT CHANGE UP
B PERT+PARAPERT DNA PNL SPEC NAA+PROBE: SIGNIFICANT CHANGE UP
BACTERIA # UR AUTO: ABNORMAL
BASE EXCESS BLDV CALC-SCNC: -2 MMOL/L — SIGNIFICANT CHANGE UP (ref -2–3)
BASE EXCESS BLDV CALC-SCNC: -2.6 MMOL/L — LOW (ref -2–3)
BASE EXCESS BLDV CALC-SCNC: -8.2 MMOL/L — LOW (ref -2–3)
BASOPHILS # BLD AUTO: 0 K/UL — SIGNIFICANT CHANGE UP (ref 0–0.2)
BASOPHILS # BLD AUTO: 0.08 K/UL — SIGNIFICANT CHANGE UP (ref 0–0.2)
BASOPHILS # BLD AUTO: 0.09 K/UL — SIGNIFICANT CHANGE UP (ref 0–0.2)
BASOPHILS # BLD AUTO: 0.09 K/UL — SIGNIFICANT CHANGE UP (ref 0–0.2)
BASOPHILS # BLD AUTO: 0.11 K/UL — SIGNIFICANT CHANGE UP (ref 0–0.2)
BASOPHILS # BLD AUTO: 0.13 K/UL — SIGNIFICANT CHANGE UP (ref 0–0.2)
BASOPHILS # BLD AUTO: 0.14 K/UL — SIGNIFICANT CHANGE UP (ref 0–0.2)
BASOPHILS # BLD AUTO: 0.15 K/UL — SIGNIFICANT CHANGE UP (ref 0–0.2)
BASOPHILS # BLD AUTO: 0.16 K/UL — SIGNIFICANT CHANGE UP (ref 0–0.2)
BASOPHILS # BLD AUTO: 0.16 K/UL — SIGNIFICANT CHANGE UP (ref 0–0.2)
BASOPHILS # BLD AUTO: 0.17 K/UL — SIGNIFICANT CHANGE UP (ref 0–0.2)
BASOPHILS # BLD AUTO: 0.21 K/UL — HIGH (ref 0–0.2)
BASOPHILS # BLD AUTO: 0.26 K/UL — HIGH (ref 0–0.2)
BASOPHILS NFR BLD AUTO: 0 % — SIGNIFICANT CHANGE UP (ref 0–2)
BASOPHILS NFR BLD AUTO: 0.8 % — SIGNIFICANT CHANGE UP (ref 0–2)
BASOPHILS NFR BLD AUTO: 0.8 % — SIGNIFICANT CHANGE UP (ref 0–2)
BASOPHILS NFR BLD AUTO: 0.9 % — SIGNIFICANT CHANGE UP (ref 0–2)
BASOPHILS NFR BLD AUTO: 1 % — SIGNIFICANT CHANGE UP (ref 0–2)
BASOPHILS NFR BLD AUTO: 1.1 % — SIGNIFICANT CHANGE UP (ref 0–2)
BASOPHILS NFR BLD AUTO: 1.1 % — SIGNIFICANT CHANGE UP (ref 0–2)
BASOPHILS NFR BLD AUTO: 1.6 % — SIGNIFICANT CHANGE UP (ref 0–2)
BASOPHILS NFR BLD AUTO: 2 % — SIGNIFICANT CHANGE UP (ref 0–2)
BASOPHILS NFR BLD AUTO: 2 % — SIGNIFICANT CHANGE UP (ref 0–2)
BASOPHILS NFR BLD AUTO: 2.4 % — HIGH (ref 0–2)
BASOPHILS NFR BLD AUTO: 2.9 % — HIGH (ref 0–2)
BASOPHILS NFR BLD AUTO: 3 % — HIGH (ref 0–2)
BILIRUB DIRECT SERPL-MCNC: 0.3 MG/DL
BILIRUB DIRECT SERPL-MCNC: 0.3 MG/DL — SIGNIFICANT CHANGE UP (ref 0–0.3)
BILIRUB INDIRECT FLD-MCNC: 0.4 MG/DL — SIGNIFICANT CHANGE UP (ref 0–1)
BILIRUB SERPL-MCNC: 0.4 MG/DL
BILIRUB SERPL-MCNC: 0.4 MG/DL — SIGNIFICANT CHANGE UP (ref 0.2–1.2)
BILIRUB SERPL-MCNC: 0.4 MG/DL — SIGNIFICANT CHANGE UP (ref 0.2–1.2)
BILIRUB SERPL-MCNC: 0.5 MG/DL
BILIRUB SERPL-MCNC: 0.5 MG/DL — SIGNIFICANT CHANGE UP (ref 0.2–1.2)
BILIRUB SERPL-MCNC: 0.6 MG/DL
BILIRUB SERPL-MCNC: 0.6 MG/DL
BILIRUB SERPL-MCNC: 0.6 MG/DL — SIGNIFICANT CHANGE UP (ref 0.2–1.2)
BILIRUB SERPL-MCNC: 0.7 MG/DL — SIGNIFICANT CHANGE UP (ref 0.2–1.2)
BILIRUB SERPL-MCNC: 0.8 MG/DL — SIGNIFICANT CHANGE UP (ref 0.2–1.2)
BILIRUB SERPL-MCNC: 0.9 MG/DL — SIGNIFICANT CHANGE UP (ref 0.2–1.2)
BILIRUB SERPL-MCNC: 0.9 MG/DL — SIGNIFICANT CHANGE UP (ref 0.2–1.2)
BILIRUB UR-MCNC: ABNORMAL
BILIRUB UR-MCNC: ABNORMAL
BILIRUB UR-MCNC: NEGATIVE — SIGNIFICANT CHANGE UP
BLD GP AB SCN SERPL QL: NEGATIVE — SIGNIFICANT CHANGE UP
BLOOD GAS VENOUS COMPREHENSIVE RESULT: SIGNIFICANT CHANGE UP
BLOOD GAS VENOUS COMPREHENSIVE RESULT: SIGNIFICANT CHANGE UP
BORDETELLA PARAPERTUSSIS (RAPRVP): SIGNIFICANT CHANGE UP
BORDETELLA PARAPERTUSSIS (RAPRVP): SIGNIFICANT CHANGE UP
BUN SERPL-MCNC: 10 MG/DL — SIGNIFICANT CHANGE UP (ref 7–23)
BUN SERPL-MCNC: 12 MG/DL — SIGNIFICANT CHANGE UP (ref 7–23)
BUN SERPL-MCNC: 12 MG/DL — SIGNIFICANT CHANGE UP (ref 7–23)
BUN SERPL-MCNC: 13 MG/DL — SIGNIFICANT CHANGE UP (ref 7–23)
BUN SERPL-MCNC: 14 MG/DL
BUN SERPL-MCNC: 14 MG/DL — SIGNIFICANT CHANGE UP (ref 7–23)
BUN SERPL-MCNC: 15 MG/DL — SIGNIFICANT CHANGE UP (ref 7–23)
BUN SERPL-MCNC: 16 MG/DL
BUN SERPL-MCNC: 16 MG/DL — SIGNIFICANT CHANGE UP (ref 7–23)
BUN SERPL-MCNC: 16 MG/DL — SIGNIFICANT CHANGE UP (ref 7–23)
BUN SERPL-MCNC: 17 MG/DL — SIGNIFICANT CHANGE UP (ref 7–23)
BUN SERPL-MCNC: 18 MG/DL — SIGNIFICANT CHANGE UP (ref 7–23)
BUN SERPL-MCNC: 18 MG/DL — SIGNIFICANT CHANGE UP (ref 7–23)
BUN SERPL-MCNC: 19 MG/DL — SIGNIFICANT CHANGE UP (ref 7–23)
BUN SERPL-MCNC: 19 MG/DL — SIGNIFICANT CHANGE UP (ref 7–23)
BUN SERPL-MCNC: 20 MG/DL — SIGNIFICANT CHANGE UP (ref 7–23)
BUN SERPL-MCNC: 21 MG/DL
BUN SERPL-MCNC: 21 MG/DL — SIGNIFICANT CHANGE UP (ref 7–23)
BUN SERPL-MCNC: 23 MG/DL — SIGNIFICANT CHANGE UP (ref 7–23)
BUN SERPL-MCNC: 30 MG/DL — HIGH (ref 7–23)
BUN SERPL-MCNC: 34 MG/DL
BUN SERPL-MCNC: 9 MG/DL — SIGNIFICANT CHANGE UP (ref 7–23)
C PNEUM DNA SPEC QL NAA+PROBE: SIGNIFICANT CHANGE UP
C PNEUM DNA SPEC QL NAA+PROBE: SIGNIFICANT CHANGE UP
C-ANCA SER-ACNC: NEGATIVE — SIGNIFICANT CHANGE UP
CA-I SERPL-SCNC: 1.15 MMOL/L — SIGNIFICANT CHANGE UP (ref 1.15–1.33)
CALCIUM SERPL-MCNC: 7.4 MG/DL — LOW (ref 8.4–10.5)
CALCIUM SERPL-MCNC: 7.6 MG/DL — LOW (ref 8.4–10.5)
CALCIUM SERPL-MCNC: 7.6 MG/DL — LOW (ref 8.4–10.5)
CALCIUM SERPL-MCNC: 7.7 MG/DL — LOW (ref 8.4–10.5)
CALCIUM SERPL-MCNC: 7.8 MG/DL — LOW (ref 8.4–10.5)
CALCIUM SERPL-MCNC: 7.9 MG/DL — LOW (ref 8.4–10.5)
CALCIUM SERPL-MCNC: 8 MG/DL — LOW (ref 8.4–10.5)
CALCIUM SERPL-MCNC: 8.1 MG/DL — LOW (ref 8.4–10.5)
CALCIUM SERPL-MCNC: 8.2 MG/DL — LOW (ref 8.4–10.5)
CALCIUM SERPL-MCNC: 8.3 MG/DL — LOW (ref 8.4–10.5)
CALCIUM SERPL-MCNC: 8.3 MG/DL — LOW (ref 8.4–10.5)
CALCIUM SERPL-MCNC: 8.4 MG/DL — SIGNIFICANT CHANGE UP (ref 8.4–10.5)
CALCIUM SERPL-MCNC: 8.4 MG/DL — SIGNIFICANT CHANGE UP (ref 8.4–10.5)
CALCIUM SERPL-MCNC: 8.5 MG/DL — SIGNIFICANT CHANGE UP (ref 8.4–10.5)
CALCIUM SERPL-MCNC: 8.6 MG/DL
CALCIUM SERPL-MCNC: 8.6 MG/DL — SIGNIFICANT CHANGE UP (ref 8.4–10.5)
CALCIUM SERPL-MCNC: 8.8 MG/DL — SIGNIFICANT CHANGE UP (ref 8.4–10.5)
CALCIUM SERPL-MCNC: 9.1 MG/DL — SIGNIFICANT CHANGE UP (ref 8.4–10.5)
CALCIUM SERPL-MCNC: 9.3 MG/DL
CALCIUM SERPL-MCNC: 9.3 MG/DL
CALCIUM SERPL-MCNC: 9.7 MG/DL
CD16+CD56+ CELLS NFR BLD: 5 % — LOW (ref 7–27)
CD16+CD56+ CELLS NFR SPEC: 86 /UL — SIGNIFICANT CHANGE UP (ref 80–426)
CD19 BLASTS SPEC-ACNC: 5 % — SIGNIFICANT CHANGE UP (ref 4–18)
CD19 BLASTS SPEC-ACNC: 81 /UL — SIGNIFICANT CHANGE UP (ref 32–326)
CD3 BLASTS SPEC-ACNC: 1445 /UL — SIGNIFICANT CHANGE UP (ref 396–2024)
CD3 BLASTS SPEC-ACNC: 88 % — HIGH (ref 58–84)
CD4 %: 27 % — LOW (ref 30–56)
CD8 %: 59 % — HIGH (ref 11–43)
CHLORIDE BLDV-SCNC: 103 MMOL/L — SIGNIFICANT CHANGE UP (ref 96–108)
CHLORIDE BLDV-SCNC: 105 MMOL/L — SIGNIFICANT CHANGE UP (ref 96–108)
CHLORIDE BLDV-SCNC: 107 MMOL/L — SIGNIFICANT CHANGE UP (ref 96–108)
CHLORIDE SERPL-SCNC: 100 MMOL/L
CHLORIDE SERPL-SCNC: 100 MMOL/L — SIGNIFICANT CHANGE UP (ref 98–107)
CHLORIDE SERPL-SCNC: 100 MMOL/L — SIGNIFICANT CHANGE UP (ref 98–107)
CHLORIDE SERPL-SCNC: 101 MMOL/L — SIGNIFICANT CHANGE UP (ref 98–107)
CHLORIDE SERPL-SCNC: 102 MMOL/L — SIGNIFICANT CHANGE UP (ref 98–107)
CHLORIDE SERPL-SCNC: 103 MMOL/L
CHLORIDE SERPL-SCNC: 103 MMOL/L — SIGNIFICANT CHANGE UP (ref 98–107)
CHLORIDE SERPL-SCNC: 104 MMOL/L — SIGNIFICANT CHANGE UP (ref 98–107)
CHLORIDE SERPL-SCNC: 105 MMOL/L — SIGNIFICANT CHANGE UP (ref 98–107)
CHLORIDE SERPL-SCNC: 105 MMOL/L — SIGNIFICANT CHANGE UP (ref 98–107)
CHLORIDE SERPL-SCNC: 106 MMOL/L
CHLORIDE SERPL-SCNC: 106 MMOL/L — SIGNIFICANT CHANGE UP (ref 98–107)
CHLORIDE SERPL-SCNC: 107 MMOL/L — SIGNIFICANT CHANGE UP (ref 98–107)
CHLORIDE SERPL-SCNC: 107 MMOL/L — SIGNIFICANT CHANGE UP (ref 98–107)
CHLORIDE SERPL-SCNC: 109 MMOL/L
CHLORIDE SERPL-SCNC: 109 MMOL/L — HIGH (ref 98–107)
CHLORIDE SERPL-SCNC: 96 MMOL/L — LOW (ref 98–107)
CHLORIDE SERPL-SCNC: 97 MMOL/L — LOW (ref 98–107)
CHLORIDE SERPL-SCNC: 98 MMOL/L — SIGNIFICANT CHANGE UP (ref 98–107)
CHLORIDE SERPL-SCNC: 98 MMOL/L — SIGNIFICANT CHANGE UP (ref 98–107)
CHLORIDE SERPL-SCNC: 99 MMOL/L — SIGNIFICANT CHANGE UP (ref 98–107)
CHOLEST FLD-MCNC: 28 MG/DL — SIGNIFICANT CHANGE UP
CHOLEST SERPL-MCNC: 84 MG/DL — SIGNIFICANT CHANGE UP
CHROM ANALY OVERALL INTERP SPEC-IMP: SIGNIFICANT CHANGE UP
CO2 BLDV-SCNC: 18 MMOL/L — LOW (ref 22–26)
CO2 BLDV-SCNC: 22.8 MMOL/L — SIGNIFICANT CHANGE UP (ref 22–26)
CO2 BLDV-SCNC: 22.9 MMOL/L — SIGNIFICANT CHANGE UP (ref 22–26)
CO2 SERPL-SCNC: 16 MMOL/L — LOW (ref 22–31)
CO2 SERPL-SCNC: 18 MMOL/L — LOW (ref 22–31)
CO2 SERPL-SCNC: 19 MMOL/L — LOW (ref 22–31)
CO2 SERPL-SCNC: 20 MMOL/L — LOW (ref 22–31)
CO2 SERPL-SCNC: 21 MMOL/L
CO2 SERPL-SCNC: 21 MMOL/L — LOW (ref 22–31)
CO2 SERPL-SCNC: 22 MMOL/L — SIGNIFICANT CHANGE UP (ref 22–31)
CO2 SERPL-SCNC: 23 MMOL/L
CO2 SERPL-SCNC: 23 MMOL/L
CO2 SERPL-SCNC: 23 MMOL/L — SIGNIFICANT CHANGE UP (ref 22–31)
CO2 SERPL-SCNC: 24 MMOL/L
CO2 SERPL-SCNC: 24 MMOL/L — SIGNIFICANT CHANGE UP (ref 22–31)
CO2 SERPL-SCNC: 25 MMOL/L — SIGNIFICANT CHANGE UP (ref 22–31)
CO2 SERPL-SCNC: 25 MMOL/L — SIGNIFICANT CHANGE UP (ref 22–31)
CO2 SERPL-SCNC: 26 MMOL/L — SIGNIFICANT CHANGE UP (ref 22–31)
CO2 SERPL-SCNC: 27 MMOL/L — SIGNIFICANT CHANGE UP (ref 22–31)
CO2 SERPL-SCNC: 27 MMOL/L — SIGNIFICANT CHANGE UP (ref 22–31)
COLOR FLD: YELLOW
COLOR SPEC: ABNORMAL
COLOR SPEC: YELLOW — SIGNIFICANT CHANGE UP
COLOR SPEC: YELLOW — SIGNIFICANT CHANGE UP
CREAT ?TM UR-MCNC: 126 MG/DL — SIGNIFICANT CHANGE UP
CREAT ?TM UR-MCNC: 58 MG/DL — SIGNIFICANT CHANGE UP
CREAT ?TM UR-MCNC: 93 MG/DL — SIGNIFICANT CHANGE UP
CREAT SERPL-MCNC: 0.41 MG/DL — LOW (ref 0.5–1.3)
CREAT SERPL-MCNC: 0.44 MG/DL — LOW (ref 0.5–1.3)
CREAT SERPL-MCNC: 0.45 MG/DL — LOW (ref 0.5–1.3)
CREAT SERPL-MCNC: 0.46 MG/DL — LOW (ref 0.5–1.3)
CREAT SERPL-MCNC: 0.46 MG/DL — LOW (ref 0.5–1.3)
CREAT SERPL-MCNC: 0.47 MG/DL — LOW (ref 0.5–1.3)
CREAT SERPL-MCNC: 0.47 MG/DL — LOW (ref 0.5–1.3)
CREAT SERPL-MCNC: 0.48 MG/DL — LOW (ref 0.5–1.3)
CREAT SERPL-MCNC: 0.48 MG/DL — LOW (ref 0.5–1.3)
CREAT SERPL-MCNC: 0.49 MG/DL — LOW (ref 0.5–1.3)
CREAT SERPL-MCNC: 0.49 MG/DL — LOW (ref 0.5–1.3)
CREAT SERPL-MCNC: 0.52 MG/DL — SIGNIFICANT CHANGE UP (ref 0.5–1.3)
CREAT SERPL-MCNC: 0.53 MG/DL — SIGNIFICANT CHANGE UP (ref 0.5–1.3)
CREAT SERPL-MCNC: 0.55 MG/DL — SIGNIFICANT CHANGE UP (ref 0.5–1.3)
CREAT SERPL-MCNC: 0.57 MG/DL — SIGNIFICANT CHANGE UP (ref 0.5–1.3)
CREAT SERPL-MCNC: 0.57 MG/DL — SIGNIFICANT CHANGE UP (ref 0.5–1.3)
CREAT SERPL-MCNC: 0.59 MG/DL — SIGNIFICANT CHANGE UP (ref 0.5–1.3)
CREAT SERPL-MCNC: 0.59 MG/DL — SIGNIFICANT CHANGE UP (ref 0.5–1.3)
CREAT SERPL-MCNC: 0.6 MG/DL — SIGNIFICANT CHANGE UP (ref 0.5–1.3)
CREAT SERPL-MCNC: 0.6 MG/DL — SIGNIFICANT CHANGE UP (ref 0.5–1.3)
CREAT SERPL-MCNC: 0.62 MG/DL — SIGNIFICANT CHANGE UP (ref 0.5–1.3)
CREAT SERPL-MCNC: 0.63 MG/DL — SIGNIFICANT CHANGE UP (ref 0.5–1.3)
CREAT SERPL-MCNC: 0.64 MG/DL — SIGNIFICANT CHANGE UP (ref 0.5–1.3)
CREAT SERPL-MCNC: 0.66 MG/DL — SIGNIFICANT CHANGE UP (ref 0.5–1.3)
CREAT SERPL-MCNC: 0.67 MG/DL — SIGNIFICANT CHANGE UP (ref 0.5–1.3)
CREAT SERPL-MCNC: 0.68 MG/DL — SIGNIFICANT CHANGE UP (ref 0.5–1.3)
CREAT SERPL-MCNC: 0.73 MG/DL — SIGNIFICANT CHANGE UP (ref 0.5–1.3)
CREAT SERPL-MCNC: 0.75 MG/DL — SIGNIFICANT CHANGE UP (ref 0.5–1.3)
CREAT SERPL-MCNC: 0.76 MG/DL — SIGNIFICANT CHANGE UP (ref 0.5–1.3)
CREAT SERPL-MCNC: 0.77 MG/DL — SIGNIFICANT CHANGE UP (ref 0.5–1.3)
CREAT SERPL-MCNC: 0.78 MG/DL — SIGNIFICANT CHANGE UP (ref 0.5–1.3)
CREAT SERPL-MCNC: 0.8 MG/DL
CREAT SERPL-MCNC: 0.82 MG/DL
CREAT SERPL-MCNC: 0.82 MG/DL — SIGNIFICANT CHANGE UP (ref 0.5–1.3)
CREAT SERPL-MCNC: 0.85 MG/DL
CREAT SERPL-MCNC: 1 MG/DL
CRP SERPL-MCNC: 124.3 MG/L — HIGH
CULTURE RESULTS: SIGNIFICANT CHANGE UP
D DIMER BLD IA.RAPID-MCNC: 1111 NG/ML DDU — HIGH
D DIMER BLD IA.RAPID-MCNC: 1291 NG/ML DDU — HIGH
D DIMER BLD IA.RAPID-MCNC: 1867 NG/ML DDU — HIGH
D DIMER BLD IA.RAPID-MCNC: 2028 NG/ML DDU — HIGH
D DIMER BLD IA.RAPID-MCNC: 2286 NG/ML DDU — HIGH
D DIMER BLD IA.RAPID-MCNC: 2599 NG/ML DDU — HIGH
D DIMER BLD IA.RAPID-MCNC: 2792 NG/ML DDU — HIGH
DACRYOCYTES BLD QL SMEAR: SLIGHT — SIGNIFICANT CHANGE UP
DEPRECATED S PNEUM 1 IGG SER-MCNC: 4.6 MCG/ML — SIGNIFICANT CHANGE UP
DEPRECATED S PNEUM12 IGG SER-MCNC: 0.9 MCG/ML — SIGNIFICANT CHANGE UP
DEPRECATED S PNEUM14 IGG SER-MCNC: 5.7 MCG/ML — SIGNIFICANT CHANGE UP
DEPRECATED S PNEUM17 IGG SER-MCNC: 3 MCG/ML — SIGNIFICANT CHANGE UP
DEPRECATED S PNEUM19 IGG SER-MCNC: 2.7 MCG/ML — SIGNIFICANT CHANGE UP
DEPRECATED S PNEUM19 IGG SER-MCNC: 4.3 MCG/ML — SIGNIFICANT CHANGE UP
DEPRECATED S PNEUM2 IGG SER-MCNC: 1.4 MCG/ML — SIGNIFICANT CHANGE UP
DEPRECATED S PNEUM20 IGG SER-MCNC: 4.8 MCG/ML — SIGNIFICANT CHANGE UP
DEPRECATED S PNEUM22 IGG SER-MCNC: 3.2 MCG/ML — SIGNIFICANT CHANGE UP
DEPRECATED S PNEUM23 IGG SER-MCNC: SIGNIFICANT CHANGE UP MCG/ML
DEPRECATED S PNEUM3 IGG SER-MCNC: 1.5 MCG/ML — SIGNIFICANT CHANGE UP
DEPRECATED S PNEUM4 IGG SER-MCNC: 0.9 MCG/ML — SIGNIFICANT CHANGE UP
DEPRECATED S PNEUM5 IGG SER-MCNC: 1.6 MCG/ML — SIGNIFICANT CHANGE UP
DEPRECATED S PNEUM8 IGG SER-MCNC: <0.4 MCG/ML — SIGNIFICANT CHANGE UP
DEPRECATED S PNEUM9 IGG SER-MCNC: 6.7 MCG/ML — SIGNIFICANT CHANGE UP
DEPRECATED S PNEUM9 IGG SER-MCNC: SIGNIFICANT CHANGE UP MCG/ML
DIFF PNL FLD: ABNORMAL
DIFF PNL FLD: NEGATIVE — SIGNIFICANT CHANGE UP
DIFF PNL FLD: NEGATIVE — SIGNIFICANT CHANGE UP
EBV DNA SERPL NAA+PROBE-ACNC: 37 IU/ML
EBV DNA SERPL NAA+PROBE-ACNC: ABNORMAL IU/ML
EBV DNA SERPL NAA+PROBE-ACNC: ABNORMAL IU/ML
EBV DNA SERPL NAA+PROBE-ACNC: NOT DETECTED IU/ML
EBVPCR LOG: 1.57 LOG10IU/ML
EBVPCR LOG: ABNORMAL LOG10IU/ML
EBVPCR LOG: ABNORMAL LOG10IU/ML
EGFR: 100 ML/MIN/1.73M2 — SIGNIFICANT CHANGE UP
EGFR: 57 ML/MIN/1.73M2
EGFR: 70 ML/MIN/1.73M2
EGFR: 73 ML/MIN/1.73M2
EGFR: 73 ML/MIN/1.73M2 — SIGNIFICANT CHANGE UP
EGFR: 75 ML/MIN/1.73M2
EGFR: 77 ML/MIN/1.73M2 — SIGNIFICANT CHANGE UP
EGFR: 78 ML/MIN/1.73M2 — SIGNIFICANT CHANGE UP
EGFR: 80 ML/MIN/1.73M2 — SIGNIFICANT CHANGE UP
EGFR: 81 ML/MIN/1.73M2 — SIGNIFICANT CHANGE UP
EGFR: 84 ML/MIN/1.73M2 — SIGNIFICANT CHANGE UP
EGFR: 89 ML/MIN/1.73M2 — SIGNIFICANT CHANGE UP
EGFR: 90 ML/MIN/1.73M2 — SIGNIFICANT CHANGE UP
EGFR: 90 ML/MIN/1.73M2 — SIGNIFICANT CHANGE UP
EGFR: 91 ML/MIN/1.73M2 — SIGNIFICANT CHANGE UP
EGFR: 92 ML/MIN/1.73M2 — SIGNIFICANT CHANGE UP
EGFR: 93 ML/MIN/1.73M2 — SIGNIFICANT CHANGE UP
EGFR: 94 ML/MIN/1.73M2 — SIGNIFICANT CHANGE UP
EGFR: 94 ML/MIN/1.73M2 — SIGNIFICANT CHANGE UP
EGFR: 96 ML/MIN/1.73M2 — SIGNIFICANT CHANGE UP
EGFR: 97 ML/MIN/1.73M2 — SIGNIFICANT CHANGE UP
EGFR: 98 ML/MIN/1.73M2 — SIGNIFICANT CHANGE UP
EOSINOPHIL # BLD AUTO: 0 K/UL — SIGNIFICANT CHANGE UP (ref 0–0.5)
EOSINOPHIL # BLD AUTO: 0.01 K/UL — SIGNIFICANT CHANGE UP (ref 0–0.5)
EOSINOPHIL # BLD AUTO: 0.02 K/UL — SIGNIFICANT CHANGE UP (ref 0–0.5)
EOSINOPHIL # BLD AUTO: 0.07 K/UL — SIGNIFICANT CHANGE UP (ref 0–0.5)
EOSINOPHIL # BLD AUTO: 0.12 K/UL — SIGNIFICANT CHANGE UP (ref 0–0.5)
EOSINOPHIL # BLD AUTO: 0.23 K/UL — SIGNIFICANT CHANGE UP (ref 0–0.5)
EOSINOPHIL # BLD AUTO: 0.24 K/UL — SIGNIFICANT CHANGE UP (ref 0–0.5)
EOSINOPHIL # FLD: 2 % — SIGNIFICANT CHANGE UP
EOSINOPHIL NFR BLD AUTO: 0 % — SIGNIFICANT CHANGE UP (ref 0–6)
EOSINOPHIL NFR BLD AUTO: 0.1 % — SIGNIFICANT CHANGE UP (ref 0–6)
EOSINOPHIL NFR BLD AUTO: 0.3 % — SIGNIFICANT CHANGE UP (ref 0–6)
EOSINOPHIL NFR BLD AUTO: 0.9 % — SIGNIFICANT CHANGE UP (ref 0–6)
EOSINOPHIL NFR BLD AUTO: 1 % — SIGNIFICANT CHANGE UP (ref 0–6)
EOSINOPHIL NFR BLD AUTO: 1.7 % — SIGNIFICANT CHANGE UP (ref 0–6)
EOSINOPHIL NFR BLD AUTO: 1.9 % — SIGNIFICANT CHANGE UP (ref 0–6)
EPI CELLS # UR: 11 /HPF — HIGH (ref 0–5)
EPI CELLS # UR: 2 /HPF — SIGNIFICANT CHANGE UP (ref 0–5)
FERRITIN SERPL-MCNC: 1570 NG/ML — HIGH (ref 15–150)
FERRITIN SERPL-MCNC: 416 NG/ML — HIGH (ref 15–150)
FERRITIN SERPL-MCNC: 830 NG/ML
FERRITIN SERPL-MCNC: 986 NG/ML — HIGH (ref 15–150)
FLUAV AG NPH QL: SIGNIFICANT CHANGE UP
FLUAV SUBTYP SPEC NAA+PROBE: SIGNIFICANT CHANGE UP
FLUAV SUBTYP SPEC NAA+PROBE: SIGNIFICANT CHANGE UP
FLUBV AG NPH QL: SIGNIFICANT CHANGE UP
FLUBV RNA SPEC QL NAA+PROBE: SIGNIFICANT CHANGE UP
FLUBV RNA SPEC QL NAA+PROBE: SIGNIFICANT CHANGE UP
FLUID INTAKE SUBSTANCE CLASS: SIGNIFICANT CHANGE UP
FOLATE SERPL-MCNC: 17.7 NG/ML
FOLATE SERPL-MCNC: 20 NG/ML — HIGH (ref 3.1–17.5)
FOLATE SERPL-MCNC: >20 NG/ML
GAMMA INTERFERON BACKGROUND BLD IA-ACNC: 0.03 IU/ML — SIGNIFICANT CHANGE UP
GAMMA INTERFERON BACKGROUND BLD IA-ACNC: 0.06 IU/ML — SIGNIFICANT CHANGE UP
GAS PNL BLDV: 133 MMOL/L — LOW (ref 136–145)
GAS PNL BLDV: 134 MMOL/L — LOW (ref 136–145)
GAS PNL BLDV: 138 MMOL/L — SIGNIFICANT CHANGE UP (ref 136–145)
GAS PNL BLDV: SIGNIFICANT CHANGE UP
GIANT PLATELETS BLD QL SMEAR: PRESENT — SIGNIFICANT CHANGE UP
GLUCOSE BLDV-MCNC: 102 MG/DL — HIGH (ref 70–99)
GLUCOSE BLDV-MCNC: 119 MG/DL — HIGH (ref 70–99)
GLUCOSE BLDV-MCNC: 186 MG/DL — HIGH (ref 70–99)
GLUCOSE FLD-MCNC: 115 MG/DL — SIGNIFICANT CHANGE UP
GLUCOSE SERPL-MCNC: 100 MG/DL — HIGH (ref 70–99)
GLUCOSE SERPL-MCNC: 102 MG/DL — HIGH (ref 70–99)
GLUCOSE SERPL-MCNC: 104 MG/DL — HIGH (ref 70–99)
GLUCOSE SERPL-MCNC: 110 MG/DL — HIGH (ref 70–99)
GLUCOSE SERPL-MCNC: 112 MG/DL — HIGH (ref 70–99)
GLUCOSE SERPL-MCNC: 114 MG/DL — HIGH (ref 70–99)
GLUCOSE SERPL-MCNC: 114 MG/DL — HIGH (ref 70–99)
GLUCOSE SERPL-MCNC: 115 MG/DL — HIGH (ref 70–99)
GLUCOSE SERPL-MCNC: 118 MG/DL — HIGH (ref 70–99)
GLUCOSE SERPL-MCNC: 119 MG/DL
GLUCOSE SERPL-MCNC: 119 MG/DL — HIGH (ref 70–99)
GLUCOSE SERPL-MCNC: 119 MG/DL — HIGH (ref 70–99)
GLUCOSE SERPL-MCNC: 120 MG/DL — HIGH (ref 70–99)
GLUCOSE SERPL-MCNC: 124 MG/DL — HIGH (ref 70–99)
GLUCOSE SERPL-MCNC: 124 MG/DL — HIGH (ref 70–99)
GLUCOSE SERPL-MCNC: 133 MG/DL — HIGH (ref 70–99)
GLUCOSE SERPL-MCNC: 137 MG/DL — HIGH (ref 70–99)
GLUCOSE SERPL-MCNC: 147 MG/DL — HIGH (ref 70–99)
GLUCOSE SERPL-MCNC: 166 MG/DL — HIGH (ref 70–99)
GLUCOSE SERPL-MCNC: 196 MG/DL — HIGH (ref 70–99)
GLUCOSE SERPL-MCNC: 75 MG/DL — SIGNIFICANT CHANGE UP (ref 70–99)
GLUCOSE SERPL-MCNC: 75 MG/DL — SIGNIFICANT CHANGE UP (ref 70–99)
GLUCOSE SERPL-MCNC: 82 MG/DL — SIGNIFICANT CHANGE UP (ref 70–99)
GLUCOSE SERPL-MCNC: 83 MG/DL — SIGNIFICANT CHANGE UP (ref 70–99)
GLUCOSE SERPL-MCNC: 83 MG/DL — SIGNIFICANT CHANGE UP (ref 70–99)
GLUCOSE SERPL-MCNC: 89 MG/DL
GLUCOSE SERPL-MCNC: 91 MG/DL
GLUCOSE SERPL-MCNC: 91 MG/DL — SIGNIFICANT CHANGE UP (ref 70–99)
GLUCOSE SERPL-MCNC: 91 MG/DL — SIGNIFICANT CHANGE UP (ref 70–99)
GLUCOSE SERPL-MCNC: 92 MG/DL — SIGNIFICANT CHANGE UP (ref 70–99)
GLUCOSE SERPL-MCNC: 93 MG/DL — SIGNIFICANT CHANGE UP (ref 70–99)
GLUCOSE SERPL-MCNC: 95 MG/DL — SIGNIFICANT CHANGE UP (ref 70–99)
GLUCOSE SERPL-MCNC: 96 MG/DL
GLUCOSE SERPL-MCNC: 98 MG/DL — SIGNIFICANT CHANGE UP (ref 70–99)
GLUCOSE SERPL-MCNC: 98 MG/DL — SIGNIFICANT CHANGE UP (ref 70–99)
GLUCOSE UR QL: NEGATIVE — SIGNIFICANT CHANGE UP
GRAM STN FLD: SIGNIFICANT CHANGE UP
HADV DNA SPEC QL NAA+PROBE: SIGNIFICANT CHANGE UP
HADV DNA SPEC QL NAA+PROBE: SIGNIFICANT CHANGE UP
HAPTOGLOB SERPL-MCNC: 282 MG/DL — HIGH (ref 34–200)
HAPTOGLOB SERPL-MCNC: 380 MG/DL
HBV CORE AB SER-ACNC: SIGNIFICANT CHANGE UP
HBV SURFACE AB SER-ACNC: SIGNIFICANT CHANGE UP
HBV SURFACE AG SER-ACNC: SIGNIFICANT CHANGE UP
HCO3 BLDV-SCNC: 17 MMOL/L — LOW (ref 22–29)
HCO3 BLDV-SCNC: 22 MMOL/L — SIGNIFICANT CHANGE UP (ref 22–29)
HCO3 BLDV-SCNC: 22 MMOL/L — SIGNIFICANT CHANGE UP (ref 22–29)
HCOV 229E RNA SPEC QL NAA+PROBE: SIGNIFICANT CHANGE UP
HCOV 229E RNA SPEC QL NAA+PROBE: SIGNIFICANT CHANGE UP
HCOV HKU1 RNA SPEC QL NAA+PROBE: SIGNIFICANT CHANGE UP
HCOV HKU1 RNA SPEC QL NAA+PROBE: SIGNIFICANT CHANGE UP
HCOV NL63 RNA SPEC QL NAA+PROBE: SIGNIFICANT CHANGE UP
HCOV NL63 RNA SPEC QL NAA+PROBE: SIGNIFICANT CHANGE UP
HCOV OC43 RNA SPEC QL NAA+PROBE: SIGNIFICANT CHANGE UP
HCOV OC43 RNA SPEC QL NAA+PROBE: SIGNIFICANT CHANGE UP
HCT VFR BLD CALC: 21.6 % — LOW (ref 34.5–45)
HCT VFR BLD CALC: 23.6 % — LOW (ref 34.5–45)
HCT VFR BLD CALC: 24.6 % — LOW (ref 34.5–45)
HCT VFR BLD CALC: 25.1 % — LOW (ref 34.5–45)
HCT VFR BLD CALC: 25.3 % — LOW (ref 34.5–45)
HCT VFR BLD CALC: 26 % — LOW (ref 34.5–45)
HCT VFR BLD CALC: 26.1 % — LOW (ref 34.5–45)
HCT VFR BLD CALC: 27.1 % — LOW (ref 34.5–45)
HCT VFR BLD CALC: 27.6 % — LOW (ref 34.5–45)
HCT VFR BLD CALC: 27.7 % — LOW (ref 34.5–45)
HCT VFR BLD CALC: 27.9 % — LOW (ref 34.5–45)
HCT VFR BLD CALC: 28.2 % — LOW (ref 34.5–45)
HCT VFR BLD CALC: 28.9 % — LOW (ref 34.5–45)
HCT VFR BLD CALC: 29.1 % — LOW (ref 34.5–45)
HCT VFR BLD CALC: 29.1 % — LOW (ref 34.5–45)
HCT VFR BLD CALC: 29.4 % — LOW (ref 34.5–45)
HCT VFR BLD CALC: 29.5 % — LOW (ref 34.5–45)
HCT VFR BLD CALC: 29.7 % — LOW (ref 34.5–45)
HCT VFR BLD CALC: 29.8 % — LOW (ref 34.5–45)
HCT VFR BLD CALC: 30 % — LOW (ref 34.5–45)
HCT VFR BLD CALC: 30.5 % — LOW (ref 34.5–45)
HCT VFR BLD CALC: 30.7 % — LOW (ref 34.5–45)
HCT VFR BLD CALC: 30.9 % — LOW (ref 34.5–45)
HCT VFR BLD CALC: 31 % — LOW (ref 34.5–45)
HCT VFR BLD CALC: 31 % — LOW (ref 34.5–45)
HCT VFR BLD CALC: 31.2 % — LOW (ref 34.5–45)
HCT VFR BLD CALC: 32.6 % — LOW (ref 34.5–45)
HCT VFR BLD CALC: 33.2 % — LOW (ref 34.5–45)
HCT VFR BLD CALC: 34.6 % — SIGNIFICANT CHANGE UP (ref 34.5–45)
HCT VFR BLD CALC: 35.1 % — SIGNIFICANT CHANGE UP (ref 34.5–45)
HCT VFR BLD CALC: 35.5 % — SIGNIFICANT CHANGE UP (ref 34.5–45)
HCT VFR BLD CALC: 35.6 % — SIGNIFICANT CHANGE UP (ref 34.5–45)
HCT VFR BLD CALC: 36.7 % — SIGNIFICANT CHANGE UP (ref 34.5–45)
HCT VFR BLDA CALC: 28 % — LOW (ref 34.5–46.5)
HCT VFR BLDA CALC: 32 % — LOW (ref 34.5–46.5)
HCT VFR BLDA CALC: 32 % — LOW (ref 34.5–46.5)
HCV AB S/CO SERPL IA: 0.1 S/CO — SIGNIFICANT CHANGE UP (ref 0–0.99)
HCV AB SERPL-IMP: SIGNIFICANT CHANGE UP
HDLC SERPL-MCNC: 22 MG/DL — LOW
HEMATOPATHOLOGY REPORT: SIGNIFICANT CHANGE UP
HGB BLD CALC-MCNC: 10.5 G/DL — LOW (ref 11.5–15.5)
HGB BLD CALC-MCNC: 10.6 G/DL — LOW (ref 11.5–15.5)
HGB BLD CALC-MCNC: 9.4 G/DL — LOW (ref 11.5–15.5)
HGB BLD-MCNC: 10 G/DL — LOW (ref 11.5–15.5)
HGB BLD-MCNC: 10.1 G/DL — LOW (ref 11.5–15.5)
HGB BLD-MCNC: 10.6 G/DL — LOW (ref 11.5–15.5)
HGB BLD-MCNC: 10.9 G/DL — LOW (ref 11.5–15.5)
HGB BLD-MCNC: 11 G/DL — LOW (ref 11.5–15.5)
HGB BLD-MCNC: 11.6 G/DL — SIGNIFICANT CHANGE UP (ref 11.5–15.5)
HGB BLD-MCNC: 6.7 G/DL — CRITICAL LOW (ref 11.5–15.5)
HGB BLD-MCNC: 7.3 G/DL — LOW (ref 11.5–15.5)
HGB BLD-MCNC: 7.6 G/DL — LOW (ref 11.5–15.5)
HGB BLD-MCNC: 7.8 G/DL — LOW (ref 11.5–15.5)
HGB BLD-MCNC: 7.9 G/DL — LOW (ref 11.5–15.5)
HGB BLD-MCNC: 8.1 G/DL — LOW (ref 11.5–15.5)
HGB BLD-MCNC: 8.1 G/DL — LOW (ref 11.5–15.5)
HGB BLD-MCNC: 8.4 G/DL — LOW (ref 11.5–15.5)
HGB BLD-MCNC: 8.6 G/DL — LOW (ref 11.5–15.5)
HGB BLD-MCNC: 8.7 G/DL — LOW (ref 11.5–15.5)
HGB BLD-MCNC: 8.7 G/DL — LOW (ref 11.5–15.5)
HGB BLD-MCNC: 8.8 G/DL — LOW (ref 11.5–15.5)
HGB BLD-MCNC: 8.9 G/DL — LOW (ref 11.5–15.5)
HGB BLD-MCNC: 9 G/DL — LOW (ref 11.5–15.5)
HGB BLD-MCNC: 9 G/DL — LOW (ref 11.5–15.5)
HGB BLD-MCNC: 9.1 G/DL — LOW (ref 11.5–15.5)
HGB BLD-MCNC: 9.3 G/DL — LOW (ref 11.5–15.5)
HGB BLD-MCNC: 9.3 G/DL — LOW (ref 11.5–15.5)
HGB BLD-MCNC: 9.4 G/DL — LOW (ref 11.5–15.5)
HGB BLD-MCNC: 9.4 G/DL — LOW (ref 11.5–15.5)
HGB BLD-MCNC: 9.5 G/DL — LOW (ref 11.5–15.5)
HGB BLD-MCNC: 9.5 G/DL — LOW (ref 11.5–15.5)
HGB BLD-MCNC: 9.7 G/DL — LOW (ref 11.5–15.5)
HGB BLD-MCNC: 9.8 G/DL — LOW (ref 11.5–15.5)
HMPV RNA SPEC QL NAA+PROBE: SIGNIFICANT CHANGE UP
HMPV RNA SPEC QL NAA+PROBE: SIGNIFICANT CHANGE UP
HPIV1 RNA SPEC QL NAA+PROBE: SIGNIFICANT CHANGE UP
HPIV1 RNA SPEC QL NAA+PROBE: SIGNIFICANT CHANGE UP
HPIV2 RNA SPEC QL NAA+PROBE: SIGNIFICANT CHANGE UP
HPIV2 RNA SPEC QL NAA+PROBE: SIGNIFICANT CHANGE UP
HPIV3 RNA SPEC QL NAA+PROBE: SIGNIFICANT CHANGE UP
HPIV3 RNA SPEC QL NAA+PROBE: SIGNIFICANT CHANGE UP
HPIV4 RNA SPEC QL NAA+PROBE: SIGNIFICANT CHANGE UP
HPIV4 RNA SPEC QL NAA+PROBE: SIGNIFICANT CHANGE UP
HYALINE CASTS # UR AUTO: 9 /LPF — HIGH (ref 0–7)
HYPOCHROMIA BLD QL: SIGNIFICANT CHANGE UP
HYPOCHROMIA BLD QL: SLIGHT — SIGNIFICANT CHANGE UP
IANC: 12.87 K/UL — HIGH (ref 1.8–7.4)
IANC: 2.44 K/UL — SIGNIFICANT CHANGE UP (ref 1.8–7.4)
IANC: 3.06 K/UL — SIGNIFICANT CHANGE UP (ref 1.8–7.4)
IANC: 3.77 K/UL — SIGNIFICANT CHANGE UP (ref 1.8–7.4)
IANC: 3.92 K/UL — SIGNIFICANT CHANGE UP (ref 1.8–7.4)
IANC: 4.17 K/UL — SIGNIFICANT CHANGE UP (ref 1.8–7.4)
IANC: 4.31 K/UL — SIGNIFICANT CHANGE UP (ref 1.8–7.4)
IANC: 4.38 K/UL — SIGNIFICANT CHANGE UP (ref 1.8–7.4)
IANC: 4.41 K/UL — SIGNIFICANT CHANGE UP (ref 1.8–7.4)
IANC: 4.73 K/UL — SIGNIFICANT CHANGE UP (ref 1.8–7.4)
IANC: 4.98 K/UL — SIGNIFICANT CHANGE UP (ref 1.8–7.4)
IANC: 5.07 K/UL — SIGNIFICANT CHANGE UP (ref 1.8–7.4)
IANC: 5.24 K/UL — SIGNIFICANT CHANGE UP (ref 1.8–7.4)
IANC: 6.19 K/UL — SIGNIFICANT CHANGE UP (ref 1.8–7.4)
IANC: 8.29 K/UL — HIGH (ref 1.8–7.4)
IANC: 9.2 K/UL — HIGH (ref 1.8–7.4)
IANC: 9.7 K/UL — HIGH (ref 1.8–7.4)
IMM GRANULOCYTES NFR BLD AUTO: 10.9 % — HIGH (ref 0–0.9)
IMM GRANULOCYTES NFR BLD AUTO: 4.7 % — HIGH (ref 0–0.9)
IMM GRANULOCYTES NFR BLD AUTO: 4.9 % — HIGH (ref 0–0.9)
IMM GRANULOCYTES NFR BLD AUTO: 5.2 % — HIGH (ref 0–0.9)
IMM GRANULOCYTES NFR BLD AUTO: 6 % — HIGH (ref 0–0.9)
IMM GRANULOCYTES NFR BLD AUTO: 6.3 % — HIGH (ref 0–0.9)
IMM GRANULOCYTES NFR BLD AUTO: 6.4 % — HIGH (ref 0–0.9)
IMM GRANULOCYTES NFR BLD AUTO: 7.1 % — HIGH (ref 0–0.9)
IMM GRANULOCYTES NFR BLD AUTO: 7.2 % — HIGH (ref 0–0.9)
IMM GRANULOCYTES NFR BLD AUTO: 7.2 % — HIGH (ref 0–0.9)
IMM GRANULOCYTES NFR BLD AUTO: 7.4 % — HIGH (ref 0–0.9)
IMM GRANULOCYTES NFR BLD AUTO: 9 % — HIGH (ref 0–0.9)
INR BLD: 1.2 RATIO — HIGH (ref 0.88–1.16)
INR BLD: 1.27 RATIO — HIGH (ref 0.88–1.16)
INR BLD: 1.31 RATIO — HIGH (ref 0.88–1.16)
INR BLD: 1.36 RATIO — HIGH (ref 0.88–1.16)
INR BLD: 1.47 RATIO — HIGH (ref 0.88–1.16)
INR BLD: 1.6 RATIO — HIGH (ref 0.88–1.16)
INR PPP: 1.3 RATIO
IRON SATN MFR SERPL: 16 % — SIGNIFICANT CHANGE UP (ref 14–50)
IRON SATN MFR SERPL: 25 UG/DL — LOW (ref 30–160)
IRON SATN MFR SERPL: 97 UG/DL — SIGNIFICANT CHANGE UP (ref 30–160)
KETONES UR-MCNC: ABNORMAL
KETONES UR-MCNC: ABNORMAL
KETONES UR-MCNC: NEGATIVE — SIGNIFICANT CHANGE UP
LACTATE BLDV-MCNC: 1.2 MMOL/L — SIGNIFICANT CHANGE UP (ref 0.5–2)
LACTATE BLDV-MCNC: 2.3 MMOL/L — HIGH (ref 0.5–2)
LACTATE BLDV-MCNC: 2.4 MMOL/L — HIGH (ref 0.5–2)
LACTATE SERPL-SCNC: 1.4 MMOL/L — SIGNIFICANT CHANGE UP (ref 0.5–2)
LACTATE SERPL-SCNC: 2.4 MMOL/L — HIGH (ref 0.5–2)
LDH SERPL L TO P-CCNC: 223 U/L — SIGNIFICANT CHANGE UP (ref 135–225)
LDH SERPL L TO P-CCNC: 232 U/L — HIGH (ref 135–225)
LDH SERPL L TO P-CCNC: 249 U/L — HIGH (ref 135–225)
LDH SERPL L TO P-CCNC: 279 U/L — HIGH (ref 135–225)
LDH SERPL L TO P-CCNC: 296 U/L — HIGH (ref 135–225)
LDH SERPL L TO P-CCNC: 299 U/L — HIGH (ref 135–225)
LDH SERPL L TO P-CCNC: 305 U/L — HIGH (ref 135–225)
LDH SERPL L TO P-CCNC: 310 U/L — HIGH (ref 135–225)
LDH SERPL L TO P-CCNC: 325 U/L — HIGH (ref 135–225)
LDH SERPL L TO P-CCNC: 353 U/L — SIGNIFICANT CHANGE UP
LDH SERPL L TO P-CCNC: 365 U/L — HIGH (ref 135–225)
LDH SERPL L TO P-CCNC: 372 U/L — HIGH (ref 135–225)
LDH SERPL-CCNC: 256 U/L
LDH SERPL-CCNC: 257 U/L
LDH SERPL-CCNC: 303 U/L
LDH SERPL-CCNC: 304 U/L
LDH SERPL-CCNC: 311 U/L
LEGIONELLA AG UR QL: NEGATIVE — SIGNIFICANT CHANGE UP
LEUKOCYTE ESTERASE UR-ACNC: NEGATIVE — SIGNIFICANT CHANGE UP
LIPID PNL WITH DIRECT LDL SERPL: 46 MG/DL — SIGNIFICANT CHANGE UP
LYMPHOCYTES # BLD AUTO: 0.47 K/UL — LOW (ref 1–3.3)
LYMPHOCYTES # BLD AUTO: 0.95 K/UL — LOW (ref 1–3.3)
LYMPHOCYTES # BLD AUTO: 1.04 K/UL — SIGNIFICANT CHANGE UP (ref 1–3.3)
LYMPHOCYTES # BLD AUTO: 1.09 K/UL — SIGNIFICANT CHANGE UP (ref 1–3.3)
LYMPHOCYTES # BLD AUTO: 1.16 K/UL — SIGNIFICANT CHANGE UP (ref 1–3.3)
LYMPHOCYTES # BLD AUTO: 1.49 K/UL — SIGNIFICANT CHANGE UP (ref 1–3.3)
LYMPHOCYTES # BLD AUTO: 1.61 K/UL — SIGNIFICANT CHANGE UP (ref 1–3.3)
LYMPHOCYTES # BLD AUTO: 1.69 K/UL — SIGNIFICANT CHANGE UP (ref 1–3.3)
LYMPHOCYTES # BLD AUTO: 1.74 K/UL — SIGNIFICANT CHANGE UP (ref 1–3.3)
LYMPHOCYTES # BLD AUTO: 1.76 K/UL — SIGNIFICANT CHANGE UP (ref 1–3.3)
LYMPHOCYTES # BLD AUTO: 1.79 K/UL — SIGNIFICANT CHANGE UP (ref 1–3.3)
LYMPHOCYTES # BLD AUTO: 1.86 K/UL — SIGNIFICANT CHANGE UP (ref 1–3.3)
LYMPHOCYTES # BLD AUTO: 1.86 K/UL — SIGNIFICANT CHANGE UP (ref 1–3.3)
LYMPHOCYTES # BLD AUTO: 1.98 K/UL — SIGNIFICANT CHANGE UP (ref 1–3.3)
LYMPHOCYTES # BLD AUTO: 12.5 % — LOW (ref 13–44)
LYMPHOCYTES # BLD AUTO: 14.9 % — SIGNIFICANT CHANGE UP (ref 13–44)
LYMPHOCYTES # BLD AUTO: 17 % — SIGNIFICANT CHANGE UP (ref 13–44)
LYMPHOCYTES # BLD AUTO: 17.4 % — SIGNIFICANT CHANGE UP (ref 13–44)
LYMPHOCYTES # BLD AUTO: 17.5 % — SIGNIFICANT CHANGE UP (ref 13–44)
LYMPHOCYTES # BLD AUTO: 18.6 % — SIGNIFICANT CHANGE UP (ref 13–44)
LYMPHOCYTES # BLD AUTO: 19.8 % — SIGNIFICANT CHANGE UP (ref 13–44)
LYMPHOCYTES # BLD AUTO: 2.1 K/UL — SIGNIFICANT CHANGE UP (ref 1–3.3)
LYMPHOCYTES # BLD AUTO: 2.11 K/UL — SIGNIFICANT CHANGE UP (ref 1–3.3)
LYMPHOCYTES # BLD AUTO: 2.3 K/UL — SIGNIFICANT CHANGE UP (ref 1–3.3)
LYMPHOCYTES # BLD AUTO: 2.37 K/UL — SIGNIFICANT CHANGE UP (ref 1–3.3)
LYMPHOCYTES # BLD AUTO: 20 % — SIGNIFICANT CHANGE UP (ref 13–44)
LYMPHOCYTES # BLD AUTO: 21.1 % — SIGNIFICANT CHANGE UP (ref 13–44)
LYMPHOCYTES # BLD AUTO: 22.3 % — SIGNIFICANT CHANGE UP (ref 13–44)
LYMPHOCYTES # BLD AUTO: 25.4 % — SIGNIFICANT CHANGE UP (ref 13–44)
LYMPHOCYTES # BLD AUTO: 26.3 % — SIGNIFICANT CHANGE UP (ref 13–44)
LYMPHOCYTES # BLD AUTO: 28.5 % — SIGNIFICANT CHANGE UP (ref 13–44)
LYMPHOCYTES # BLD AUTO: 30 % — SIGNIFICANT CHANGE UP (ref 13–44)
LYMPHOCYTES # BLD AUTO: 6 % — LOW (ref 13–44)
LYMPHOCYTES # BLD AUTO: 6.2 % — LOW (ref 13–44)
LYMPHOCYTES # BLD AUTO: 7 % — LOW (ref 13–44)
LYMPHOCYTES # BLD AUTO: 8.9 % — LOW (ref 13–44)
LYMPHOCYTES # FLD: 35 % — SIGNIFICANT CHANGE UP
LYMPHOCYTES # SPEC AUTO: 2 % — HIGH (ref 0–0)
LYMPHOCYTES # SPEC AUTO: 2.6 % — HIGH (ref 0–0)
M PNEUMO DNA SPEC QL NAA+PROBE: SIGNIFICANT CHANGE UP
M PNEUMO DNA SPEC QL NAA+PROBE: SIGNIFICANT CHANGE UP
M TB IFN-G BLD-IMP: NEGATIVE — SIGNIFICANT CHANGE UP
M TB IFN-G BLD-IMP: NEGATIVE — SIGNIFICANT CHANGE UP
M TB IFN-G CD4+ BCKGRND COR BLD-ACNC: -0.01 IU/ML — SIGNIFICANT CHANGE UP
M TB IFN-G CD4+ BCKGRND COR BLD-ACNC: -0.02 IU/ML — SIGNIFICANT CHANGE UP
M TB IFN-G CD4+CD8+ BCKGRND COR BLD-ACNC: -0.01 IU/ML — SIGNIFICANT CHANGE UP
M TB IFN-G CD4+CD8+ BCKGRND COR BLD-ACNC: -0.02 IU/ML — SIGNIFICANT CHANGE UP
MACROCYTES BLD QL: SIGNIFICANT CHANGE UP
MACROCYTES BLD QL: SLIGHT — SIGNIFICANT CHANGE UP
MAGNESIUM SERPL-MCNC: 1.6 MG/DL — SIGNIFICANT CHANGE UP (ref 1.6–2.6)
MAGNESIUM SERPL-MCNC: 1.7 MG/DL
MAGNESIUM SERPL-MCNC: 1.7 MG/DL — SIGNIFICANT CHANGE UP (ref 1.6–2.6)
MAGNESIUM SERPL-MCNC: 1.8 MG/DL — SIGNIFICANT CHANGE UP (ref 1.6–2.6)
MAGNESIUM SERPL-MCNC: 1.9 MG/DL
MAGNESIUM SERPL-MCNC: 1.9 MG/DL — SIGNIFICANT CHANGE UP (ref 1.6–2.6)
MAGNESIUM SERPL-MCNC: 2 MG/DL
MAGNESIUM SERPL-MCNC: 2 MG/DL
MAGNESIUM SERPL-MCNC: 2 MG/DL — SIGNIFICANT CHANGE UP (ref 1.6–2.6)
MAGNESIUM SERPL-MCNC: 2.1 MG/DL — SIGNIFICANT CHANGE UP (ref 1.6–2.6)
MAGNESIUM SERPL-MCNC: 2.2 MG/DL — SIGNIFICANT CHANGE UP (ref 1.6–2.6)
MAGNESIUM SERPL-MCNC: 2.2 MG/DL — SIGNIFICANT CHANGE UP (ref 1.6–2.6)
MAGNESIUM SERPL-MCNC: 2.3 MG/DL — SIGNIFICANT CHANGE UP (ref 1.6–2.6)
MANUAL DIF COMMENT BLD-IMP: SIGNIFICANT CHANGE UP
MANUAL DIF COMMENT BLD-IMP: SIGNIFICANT CHANGE UP
MANUAL SMEAR VERIFICATION: SIGNIFICANT CHANGE UP
MCHC RBC-ENTMCNC: 28 PG — SIGNIFICANT CHANGE UP (ref 27–34)
MCHC RBC-ENTMCNC: 28.1 PG — SIGNIFICANT CHANGE UP (ref 27–34)
MCHC RBC-ENTMCNC: 28.1 PG — SIGNIFICANT CHANGE UP (ref 27–34)
MCHC RBC-ENTMCNC: 28.2 PG — SIGNIFICANT CHANGE UP (ref 27–34)
MCHC RBC-ENTMCNC: 28.3 PG — SIGNIFICANT CHANGE UP (ref 27–34)
MCHC RBC-ENTMCNC: 28.3 PG — SIGNIFICANT CHANGE UP (ref 27–34)
MCHC RBC-ENTMCNC: 28.4 PG — SIGNIFICANT CHANGE UP (ref 27–34)
MCHC RBC-ENTMCNC: 28.4 PG — SIGNIFICANT CHANGE UP (ref 27–34)
MCHC RBC-ENTMCNC: 28.5 PG — SIGNIFICANT CHANGE UP (ref 27–34)
MCHC RBC-ENTMCNC: 28.6 PG — SIGNIFICANT CHANGE UP (ref 27–34)
MCHC RBC-ENTMCNC: 28.7 PG — SIGNIFICANT CHANGE UP (ref 27–34)
MCHC RBC-ENTMCNC: 28.9 PG — SIGNIFICANT CHANGE UP (ref 27–34)
MCHC RBC-ENTMCNC: 28.9 PG — SIGNIFICANT CHANGE UP (ref 27–34)
MCHC RBC-ENTMCNC: 29 PG — SIGNIFICANT CHANGE UP (ref 27–34)
MCHC RBC-ENTMCNC: 29.1 PG — SIGNIFICANT CHANGE UP (ref 27–34)
MCHC RBC-ENTMCNC: 29.1 PG — SIGNIFICANT CHANGE UP (ref 27–34)
MCHC RBC-ENTMCNC: 29.2 PG — SIGNIFICANT CHANGE UP (ref 27–34)
MCHC RBC-ENTMCNC: 29.3 PG — SIGNIFICANT CHANGE UP (ref 27–34)
MCHC RBC-ENTMCNC: 29.5 PG — SIGNIFICANT CHANGE UP (ref 27–34)
MCHC RBC-ENTMCNC: 29.5 PG — SIGNIFICANT CHANGE UP (ref 27–34)
MCHC RBC-ENTMCNC: 29.6 PG — SIGNIFICANT CHANGE UP (ref 27–34)
MCHC RBC-ENTMCNC: 29.7 PG — SIGNIFICANT CHANGE UP (ref 27–34)
MCHC RBC-ENTMCNC: 29.7 PG — SIGNIFICANT CHANGE UP (ref 27–34)
MCHC RBC-ENTMCNC: 29.8 GM/DL — LOW (ref 32–36)
MCHC RBC-ENTMCNC: 29.8 PG — SIGNIFICANT CHANGE UP (ref 27–34)
MCHC RBC-ENTMCNC: 29.9 GM/DL — LOW (ref 32–36)
MCHC RBC-ENTMCNC: 29.9 GM/DL — LOW (ref 32–36)
MCHC RBC-ENTMCNC: 29.9 PG — SIGNIFICANT CHANGE UP (ref 27–34)
MCHC RBC-ENTMCNC: 30 GM/DL — LOW (ref 32–36)
MCHC RBC-ENTMCNC: 30.1 GM/DL — LOW (ref 32–36)
MCHC RBC-ENTMCNC: 30.4 GM/DL — LOW (ref 32–36)
MCHC RBC-ENTMCNC: 30.4 GM/DL — LOW (ref 32–36)
MCHC RBC-ENTMCNC: 30.5 GM/DL — LOW (ref 32–36)
MCHC RBC-ENTMCNC: 30.6 GM/DL — LOW (ref 32–36)
MCHC RBC-ENTMCNC: 30.7 GM/DL — LOW (ref 32–36)
MCHC RBC-ENTMCNC: 30.8 GM/DL — LOW (ref 32–36)
MCHC RBC-ENTMCNC: 30.8 GM/DL — LOW (ref 32–36)
MCHC RBC-ENTMCNC: 30.9 GM/DL — LOW (ref 32–36)
MCHC RBC-ENTMCNC: 31 GM/DL — LOW (ref 32–36)
MCHC RBC-ENTMCNC: 31.1 GM/DL — LOW (ref 32–36)
MCHC RBC-ENTMCNC: 31.2 GM/DL — LOW (ref 32–36)
MCHC RBC-ENTMCNC: 31.3 G/DL — LOW (ref 32–36)
MCHC RBC-ENTMCNC: 31.3 GM/DL — LOW (ref 32–36)
MCHC RBC-ENTMCNC: 31.3 GM/DL — LOW (ref 32–36)
MCHC RBC-ENTMCNC: 31.4 GM/DL — LOW (ref 32–36)
MCHC RBC-ENTMCNC: 31.6 GM/DL — LOW (ref 32–36)
MCHC RBC-ENTMCNC: 31.6 GM/DL — LOW (ref 32–36)
MCHC RBC-ENTMCNC: 31.8 GM/DL — LOW (ref 32–36)
MCHC RBC-ENTMCNC: 31.9 GM/DL — LOW (ref 32–36)
MCHC RBC-ENTMCNC: 32.3 GM/DL — SIGNIFICANT CHANGE UP (ref 32–36)
MCHC RBC-ENTMCNC: 32.4 GM/DL — SIGNIFICANT CHANGE UP (ref 32–36)
MCV RBC AUTO: 89.9 FL — SIGNIFICANT CHANGE UP (ref 80–100)
MCV RBC AUTO: 90.4 FL — SIGNIFICANT CHANGE UP (ref 80–100)
MCV RBC AUTO: 90.6 FL — SIGNIFICANT CHANGE UP (ref 80–100)
MCV RBC AUTO: 90.9 FL — SIGNIFICANT CHANGE UP (ref 80–100)
MCV RBC AUTO: 90.9 FL — SIGNIFICANT CHANGE UP (ref 80–100)
MCV RBC AUTO: 91.2 FL — SIGNIFICANT CHANGE UP (ref 80–100)
MCV RBC AUTO: 91.3 FL — SIGNIFICANT CHANGE UP (ref 80–100)
MCV RBC AUTO: 91.5 FL — SIGNIFICANT CHANGE UP (ref 80–100)
MCV RBC AUTO: 91.5 FL — SIGNIFICANT CHANGE UP (ref 80–100)
MCV RBC AUTO: 91.6 FL — SIGNIFICANT CHANGE UP (ref 80–100)
MCV RBC AUTO: 91.7 FL — SIGNIFICANT CHANGE UP (ref 80–100)
MCV RBC AUTO: 91.8 FL — SIGNIFICANT CHANGE UP (ref 80–100)
MCV RBC AUTO: 92.7 FL — SIGNIFICANT CHANGE UP (ref 80–100)
MCV RBC AUTO: 92.8 FL — SIGNIFICANT CHANGE UP (ref 80–100)
MCV RBC AUTO: 92.9 FL — SIGNIFICANT CHANGE UP (ref 80–100)
MCV RBC AUTO: 92.9 FL — SIGNIFICANT CHANGE UP (ref 80–100)
MCV RBC AUTO: 93 FL — SIGNIFICANT CHANGE UP (ref 80–100)
MCV RBC AUTO: 93.2 FL — SIGNIFICANT CHANGE UP (ref 80–100)
MCV RBC AUTO: 93.2 FL — SIGNIFICANT CHANGE UP (ref 80–100)
MCV RBC AUTO: 93.3 FL — SIGNIFICANT CHANGE UP (ref 80–100)
MCV RBC AUTO: 93.3 FL — SIGNIFICANT CHANGE UP (ref 80–100)
MCV RBC AUTO: 93.4 FL — SIGNIFICANT CHANGE UP (ref 80–100)
MCV RBC AUTO: 93.6 FL — SIGNIFICANT CHANGE UP (ref 80–100)
MCV RBC AUTO: 93.8 FL — SIGNIFICANT CHANGE UP (ref 80–100)
MCV RBC AUTO: 93.9 FL — SIGNIFICANT CHANGE UP (ref 80–100)
MCV RBC AUTO: 94 FL — SIGNIFICANT CHANGE UP (ref 80–100)
MCV RBC AUTO: 94 FL — SIGNIFICANT CHANGE UP (ref 80–100)
MCV RBC AUTO: 94.1 FL — SIGNIFICANT CHANGE UP (ref 80–100)
MCV RBC AUTO: 94.4 FL — SIGNIFICANT CHANGE UP (ref 80–100)
MCV RBC AUTO: 94.8 FL — SIGNIFICANT CHANGE UP (ref 80–100)
MCV RBC AUTO: 95.1 FL — SIGNIFICANT CHANGE UP (ref 80–100)
MCV RBC AUTO: 95.7 FL — SIGNIFICANT CHANGE UP (ref 80–100)
MCV RBC AUTO: 96.2 FL — SIGNIFICANT CHANGE UP (ref 80–100)
MCV RBC AUTO: 97.3 FL — SIGNIFICANT CHANGE UP (ref 80–100)
METAMYELOCYTES # FLD: 0.9 % — SIGNIFICANT CHANGE UP (ref 0–1)
METAMYELOCYTES # FLD: 0.9 % — SIGNIFICANT CHANGE UP (ref 0–1)
METAMYELOCYTES # FLD: 1 % — HIGH (ref 0–0)
METAMYELOCYTES # FLD: 1.8 % — HIGH (ref 0–1)
METAMYELOCYTES # FLD: 2.6 % — HIGH (ref 0–1)
METAMYELOCYTES # FLD: 8.8 % — HIGH (ref 0–1)
MICROCYTES BLD QL: SLIGHT — SIGNIFICANT CHANGE UP
MONOCYTES # BLD AUTO: 0.2 K/UL — SIGNIFICANT CHANGE UP (ref 0–0.9)
MONOCYTES # BLD AUTO: 0.21 K/UL — SIGNIFICANT CHANGE UP (ref 0–0.9)
MONOCYTES # BLD AUTO: 0.33 K/UL — SIGNIFICANT CHANGE UP (ref 0–0.9)
MONOCYTES # BLD AUTO: 0.35 K/UL — SIGNIFICANT CHANGE UP (ref 0–0.9)
MONOCYTES # BLD AUTO: 0.36 K/UL — SIGNIFICANT CHANGE UP (ref 0–0.9)
MONOCYTES # BLD AUTO: 0.42 K/UL — SIGNIFICANT CHANGE UP (ref 0–0.9)
MONOCYTES # BLD AUTO: 0.63 K/UL — SIGNIFICANT CHANGE UP (ref 0–0.9)
MONOCYTES # BLD AUTO: 0.71 K/UL — SIGNIFICANT CHANGE UP (ref 0–0.9)
MONOCYTES # BLD AUTO: 0.92 K/UL — HIGH (ref 0–0.9)
MONOCYTES # BLD AUTO: 1.09 K/UL — HIGH (ref 0–0.9)
MONOCYTES # BLD AUTO: 1.14 K/UL — HIGH (ref 0–0.9)
MONOCYTES # BLD AUTO: 1.36 K/UL — HIGH (ref 0–0.9)
MONOCYTES # BLD AUTO: 1.45 K/UL — HIGH (ref 0–0.9)
MONOCYTES # BLD AUTO: 1.59 K/UL — HIGH (ref 0–0.9)
MONOCYTES # BLD AUTO: 2.54 K/UL — HIGH (ref 0–0.9)
MONOCYTES # BLD AUTO: 2.61 K/UL — HIGH (ref 0–0.9)
MONOCYTES # BLD AUTO: 2.78 K/UL — HIGH (ref 0–0.9)
MONOCYTES # BLD AUTO: 2.99 K/UL — HIGH (ref 0–0.9)
MONOCYTES NFR BLD AUTO: 15.3 % — HIGH (ref 2–14)
MONOCYTES NFR BLD AUTO: 16.3 % — HIGH (ref 2–14)
MONOCYTES NFR BLD AUTO: 18.6 % — HIGH (ref 2–14)
MONOCYTES NFR BLD AUTO: 2.6 % — SIGNIFICANT CHANGE UP (ref 2–14)
MONOCYTES NFR BLD AUTO: 2.6 % — SIGNIFICANT CHANGE UP (ref 2–14)
MONOCYTES NFR BLD AUTO: 20.7 % — HIGH (ref 2–14)
MONOCYTES NFR BLD AUTO: 26.3 % — HIGH (ref 2–14)
MONOCYTES NFR BLD AUTO: 27.9 % — HIGH (ref 2–14)
MONOCYTES NFR BLD AUTO: 28.9 % — HIGH (ref 2–14)
MONOCYTES NFR BLD AUTO: 3 % — SIGNIFICANT CHANGE UP (ref 2–14)
MONOCYTES NFR BLD AUTO: 3 % — SIGNIFICANT CHANGE UP (ref 2–14)
MONOCYTES NFR BLD AUTO: 3.6 % — SIGNIFICANT CHANGE UP (ref 2–14)
MONOCYTES NFR BLD AUTO: 30.5 % — HIGH (ref 2–14)
MONOCYTES NFR BLD AUTO: 4.7 % — SIGNIFICANT CHANGE UP (ref 2–14)
MONOCYTES NFR BLD AUTO: 4.9 % — SIGNIFICANT CHANGE UP (ref 2–14)
MONOCYTES NFR BLD AUTO: 6 % — SIGNIFICANT CHANGE UP (ref 2–14)
MONOCYTES NFR BLD AUTO: 7.9 % — SIGNIFICANT CHANGE UP (ref 2–14)
MONOCYTES NFR BLD AUTO: 8.8 % — SIGNIFICANT CHANGE UP (ref 2–14)
MONOS+MACROS # FLD: 37 % — SIGNIFICANT CHANGE UP
MPO AB + PR3 PNL SER: SIGNIFICANT CHANGE UP
MRSA PCR RESULT.: SIGNIFICANT CHANGE UP
MYELOCYTES NFR BLD: 2 % — HIGH (ref 0–0)
MYELOCYTES NFR BLD: 2.6 % — HIGH (ref 0–0)
MYELOCYTES NFR BLD: 4 % — HIGH (ref 0–0)
MYELOCYTES NFR BLD: 4.5 % — HIGH (ref 0–0)
MYELOCYTES NFR BLD: 8.8 % — HIGH (ref 0–0)
MYELOPEROXIDASE AB SER-ACNC: <5 UNITS — SIGNIFICANT CHANGE UP
MYELOPEROXIDASE CELLS FLD QL: NEGATIVE — SIGNIFICANT CHANGE UP
NEUTROPHILS # BLD AUTO: 11.7 K/UL — HIGH (ref 1.8–7.4)
NEUTROPHILS # BLD AUTO: 15.21 K/UL — HIGH (ref 1.8–7.4)
NEUTROPHILS # BLD AUTO: 2.44 K/UL — SIGNIFICANT CHANGE UP (ref 1.8–7.4)
NEUTROPHILS # BLD AUTO: 3.06 K/UL — SIGNIFICANT CHANGE UP (ref 1.8–7.4)
NEUTROPHILS # BLD AUTO: 4.17 K/UL — SIGNIFICANT CHANGE UP (ref 1.8–7.4)
NEUTROPHILS # BLD AUTO: 4.19 K/UL — SIGNIFICANT CHANGE UP (ref 1.8–7.4)
NEUTROPHILS # BLD AUTO: 4.31 K/UL — SIGNIFICANT CHANGE UP (ref 1.8–7.4)
NEUTROPHILS # BLD AUTO: 4.38 K/UL — SIGNIFICANT CHANGE UP (ref 1.8–7.4)
NEUTROPHILS # BLD AUTO: 4.41 K/UL — SIGNIFICANT CHANGE UP (ref 1.8–7.4)
NEUTROPHILS # BLD AUTO: 4.73 K/UL — SIGNIFICANT CHANGE UP (ref 1.8–7.4)
NEUTROPHILS # BLD AUTO: 4.98 K/UL — SIGNIFICANT CHANGE UP (ref 1.8–7.4)
NEUTROPHILS # BLD AUTO: 5.07 K/UL — SIGNIFICANT CHANGE UP (ref 1.8–7.4)
NEUTROPHILS # BLD AUTO: 5.24 K/UL — SIGNIFICANT CHANGE UP (ref 1.8–7.4)
NEUTROPHILS # BLD AUTO: 5.94 K/UL — SIGNIFICANT CHANGE UP (ref 1.8–7.4)
NEUTROPHILS # BLD AUTO: 8.29 K/UL — HIGH (ref 1.8–7.4)
NEUTROPHILS # BLD AUTO: 8.41 K/UL — HIGH (ref 1.8–7.4)
NEUTROPHILS # BLD AUTO: 8.99 K/UL — HIGH (ref 1.8–7.4)
NEUTROPHILS # BLD AUTO: 9.2 K/UL — HIGH (ref 1.8–7.4)
NEUTROPHILS NFR BLD AUTO: 36.7 % — LOW (ref 43–77)
NEUTROPHILS NFR BLD AUTO: 44.4 % — SIGNIFICANT CHANGE UP (ref 43–77)
NEUTROPHILS NFR BLD AUTO: 45.7 % — SIGNIFICANT CHANGE UP (ref 43–77)
NEUTROPHILS NFR BLD AUTO: 48.8 % — SIGNIFICANT CHANGE UP (ref 43–77)
NEUTROPHILS NFR BLD AUTO: 51.1 % — SIGNIFICANT CHANGE UP (ref 43–77)
NEUTROPHILS NFR BLD AUTO: 51.8 % — SIGNIFICANT CHANGE UP (ref 43–77)
NEUTROPHILS NFR BLD AUTO: 53.3 % — SIGNIFICANT CHANGE UP (ref 43–77)
NEUTROPHILS NFR BLD AUTO: 53.4 % — SIGNIFICANT CHANGE UP (ref 43–77)
NEUTROPHILS NFR BLD AUTO: 55 % — SIGNIFICANT CHANGE UP (ref 43–77)
NEUTROPHILS NFR BLD AUTO: 56.1 % — SIGNIFICANT CHANGE UP (ref 43–77)
NEUTROPHILS NFR BLD AUTO: 59.1 % — SIGNIFICANT CHANGE UP (ref 43–77)
NEUTROPHILS NFR BLD AUTO: 69 % — SIGNIFICANT CHANGE UP (ref 43–77)
NEUTROPHILS NFR BLD AUTO: 70.8 % — SIGNIFICANT CHANGE UP (ref 43–77)
NEUTROPHILS NFR BLD AUTO: 71.1 % — SIGNIFICANT CHANGE UP (ref 43–77)
NEUTROPHILS NFR BLD AUTO: 71.7 % — SIGNIFICANT CHANGE UP (ref 43–77)
NEUTROPHILS NFR BLD AUTO: 75.9 % — SIGNIFICANT CHANGE UP (ref 43–77)
NEUTROPHILS NFR BLD AUTO: 82 % — HIGH (ref 43–77)
NEUTROPHILS NFR BLD AUTO: 86.1 % — HIGH (ref 43–77)
NEUTROPHILS-BODY FLUID: 24 % — SIGNIFICANT CHANGE UP
NEUTS BAND # BLD: 0.9 % — SIGNIFICANT CHANGE UP (ref 0–6)
NEUTS BAND # BLD: 2 % — SIGNIFICANT CHANGE UP (ref 0–8)
NEUTS BAND # BLD: 3.4 % — SIGNIFICANT CHANGE UP (ref 0–6)
NEUTS BAND # BLD: 5 % — SIGNIFICANT CHANGE UP (ref 0–6)
NEUTS BAND # BLD: 5.3 % — SIGNIFICANT CHANGE UP (ref 0–6)
NEUTS BAND # BLD: 7.9 % — HIGH (ref 0–6)
NEUTS BAND # BLD: 7.9 % — HIGH (ref 0–6)
NITRITE UR-MCNC: NEGATIVE — SIGNIFICANT CHANGE UP
NON HDL CHOLESTEROL: 62 MG/DL — SIGNIFICANT CHANGE UP
NON-GYNECOLOGICAL CYTOLOGY STUDY: SIGNIFICANT CHANGE UP
NRBC # BLD: 0 /100 WBCS — SIGNIFICANT CHANGE UP (ref 0–0)
NRBC # BLD: 1 /100 WBCS — HIGH (ref 0–0)
NRBC # BLD: 1 /100 — HIGH (ref 0–0)
NRBC # BLD: 10 /100 — HIGH (ref 0–0)
NRBC # BLD: 2 /100 WBCS — HIGH (ref 0–0)
NRBC # BLD: 2 /100 — HIGH (ref 0–0)
NRBC # BLD: 3 /100 — HIGH (ref 0–0)
NRBC # BLD: SIGNIFICANT CHANGE UP /100 WBCS (ref 0–0)
NRBC # FLD: 0.04 K/UL — HIGH (ref 0–0)
NRBC # FLD: 0.04 K/UL — HIGH (ref 0–0)
NRBC # FLD: 0.05 K/UL — HIGH (ref 0–0)
NRBC # FLD: 0.06 K/UL — HIGH (ref 0–0)
NRBC # FLD: 0.07 K/UL — HIGH (ref 0–0)
NRBC # FLD: 0.09 K/UL — HIGH (ref 0–0)
NRBC # FLD: 0.09 K/UL — HIGH (ref 0–0)
NRBC # FLD: 0.11 K/UL — HIGH (ref 0–0)
NRBC # FLD: 0.12 K/UL — HIGH (ref 0–0)
NRBC # FLD: 0.13 K/UL — HIGH (ref 0–0)
NRBC # FLD: 0.13 K/UL — HIGH (ref 0–0)
NRBC # FLD: 0.14 K/UL — HIGH (ref 0–0)
NRBC # FLD: 0.14 K/UL — HIGH (ref 0–0)
NRBC # FLD: 0.15 K/UL — HIGH (ref 0–0)
NRBC # FLD: 0.15 K/UL — HIGH (ref 0–0)
NRBC # FLD: 0.16 K/UL — HIGH (ref 0–0)
NRBC # FLD: 0.17 K/UL — HIGH (ref 0–0)
NRBC # FLD: 0.18 K/UL — HIGH (ref 0–0)
NRBC # FLD: 0.18 K/UL — HIGH (ref 0–0)
NRBC # FLD: 0.19 K/UL — HIGH (ref 0–0)
NRBC # FLD: 0.19 K/UL — HIGH (ref 0–0)
NRBC # FLD: 0.2 K/UL — HIGH (ref 0–0)
NRBC # FLD: 0.21 K/UL — HIGH (ref 0–0)
NRBC # FLD: 0.22 K/UL — HIGH (ref 0–0)
NRBC # FLD: 0.24 K/UL — HIGH (ref 0–0)
NRBC # FLD: 0.26 K/UL — HIGH (ref 0–0)
NRBC # FLD: 0.48 K/UL — HIGH (ref 0–0)
NT-PROBNP SERPL-SCNC: 1026 PG/ML — HIGH
OTHER CELLS FLD MANUAL: 2 % — SIGNIFICANT CHANGE UP
OVALOCYTES BLD QL SMEAR: SLIGHT — SIGNIFICANT CHANGE UP
P-ANCA SER-ACNC: NEGATIVE — SIGNIFICANT CHANGE UP
PCO2 BLDV: 33 MMHG — LOW (ref 39–42)
PCO2 BLDV: 33 MMHG — LOW (ref 39–42)
PCO2 BLDV: 35 MMHG — LOW (ref 39–42)
PH BLDV: 7.32 — SIGNIFICANT CHANGE UP (ref 7.32–7.43)
PH BLDV: 7.4 — SIGNIFICANT CHANGE UP (ref 7.32–7.43)
PH BLDV: 7.43 — SIGNIFICANT CHANGE UP (ref 7.32–7.43)
PH FLD: 7.7 — SIGNIFICANT CHANGE UP
PH UR: 6 — SIGNIFICANT CHANGE UP (ref 5–8)
PH UR: 6.5 — SIGNIFICANT CHANGE UP (ref 5–8)
PH UR: 7 — SIGNIFICANT CHANGE UP (ref 5–8)
PHOSPHATE SERPL-MCNC: 1.5 MG/DL — LOW (ref 2.5–4.5)
PHOSPHATE SERPL-MCNC: 1.7 MG/DL — LOW (ref 2.5–4.5)
PHOSPHATE SERPL-MCNC: 1.8 MG/DL — LOW (ref 2.5–4.5)
PHOSPHATE SERPL-MCNC: 1.9 MG/DL — LOW (ref 2.5–4.5)
PHOSPHATE SERPL-MCNC: 2 MG/DL — LOW (ref 2.5–4.5)
PHOSPHATE SERPL-MCNC: 2.1 MG/DL — LOW (ref 2.5–4.5)
PHOSPHATE SERPL-MCNC: 2.3 MG/DL — LOW (ref 2.5–4.5)
PHOSPHATE SERPL-MCNC: 2.3 MG/DL — LOW (ref 2.5–4.5)
PHOSPHATE SERPL-MCNC: 2.5 MG/DL — SIGNIFICANT CHANGE UP (ref 2.5–4.5)
PHOSPHATE SERPL-MCNC: 2.6 MG/DL
PHOSPHATE SERPL-MCNC: 2.6 MG/DL — SIGNIFICANT CHANGE UP (ref 2.5–4.5)
PHOSPHATE SERPL-MCNC: 2.6 MG/DL — SIGNIFICANT CHANGE UP (ref 2.5–4.5)
PHOSPHATE SERPL-MCNC: 2.7 MG/DL — SIGNIFICANT CHANGE UP (ref 2.5–4.5)
PHOSPHATE SERPL-MCNC: 2.7 MG/DL — SIGNIFICANT CHANGE UP (ref 2.5–4.5)
PHOSPHATE SERPL-MCNC: 2.8 MG/DL — SIGNIFICANT CHANGE UP (ref 2.5–4.5)
PHOSPHATE SERPL-MCNC: 2.9 MG/DL — SIGNIFICANT CHANGE UP (ref 2.5–4.5)
PHOSPHATE SERPL-MCNC: 2.9 MG/DL — SIGNIFICANT CHANGE UP (ref 2.5–4.5)
PHOSPHATE SERPL-MCNC: 3 MG/DL
PHOSPHATE SERPL-MCNC: 3 MG/DL — SIGNIFICANT CHANGE UP (ref 2.5–4.5)
PHOSPHATE SERPL-MCNC: 3.1 MG/DL
PHOSPHATE SERPL-MCNC: 3.1 MG/DL — SIGNIFICANT CHANGE UP (ref 2.5–4.5)
PHOSPHATE SERPL-MCNC: 3.2 MG/DL — SIGNIFICANT CHANGE UP (ref 2.5–4.5)
PHOSPHATE SERPL-MCNC: 3.3 MG/DL — SIGNIFICANT CHANGE UP (ref 2.5–4.5)
PHOSPHATE SERPL-MCNC: 3.6 MG/DL — SIGNIFICANT CHANGE UP (ref 2.5–4.5)
PHOSPHATE SERPL-MCNC: 3.7 MG/DL — SIGNIFICANT CHANGE UP (ref 2.5–4.5)
PHOSPHATE SERPL-MCNC: 3.8 MG/DL
PHOSPHATE SERPL-MCNC: 4.6 MG/DL — HIGH (ref 2.5–4.5)
PLAT MORPH BLD: ABNORMAL
PLAT MORPH BLD: NORMAL — SIGNIFICANT CHANGE UP
PLATELET # BLD AUTO: 203 K/UL — SIGNIFICANT CHANGE UP (ref 150–400)
PLATELET # BLD AUTO: 203 K/UL — SIGNIFICANT CHANGE UP (ref 150–400)
PLATELET # BLD AUTO: 206 K/UL — SIGNIFICANT CHANGE UP (ref 150–400)
PLATELET # BLD AUTO: 211 K/UL — SIGNIFICANT CHANGE UP (ref 150–400)
PLATELET # BLD AUTO: 213 K/UL — SIGNIFICANT CHANGE UP (ref 150–400)
PLATELET # BLD AUTO: 213 K/UL — SIGNIFICANT CHANGE UP (ref 150–400)
PLATELET # BLD AUTO: 215 K/UL — SIGNIFICANT CHANGE UP (ref 150–400)
PLATELET # BLD AUTO: 233 K/UL — SIGNIFICANT CHANGE UP (ref 150–400)
PLATELET # BLD AUTO: 238 K/UL — SIGNIFICANT CHANGE UP (ref 150–400)
PLATELET # BLD AUTO: 240 K/UL — SIGNIFICANT CHANGE UP (ref 150–400)
PLATELET # BLD AUTO: 240 K/UL — SIGNIFICANT CHANGE UP (ref 150–400)
PLATELET # BLD AUTO: 247 K/UL — SIGNIFICANT CHANGE UP (ref 150–400)
PLATELET # BLD AUTO: 250 K/UL — SIGNIFICANT CHANGE UP (ref 150–400)
PLATELET # BLD AUTO: 253 K/UL — SIGNIFICANT CHANGE UP (ref 150–400)
PLATELET # BLD AUTO: 253 K/UL — SIGNIFICANT CHANGE UP (ref 150–400)
PLATELET # BLD AUTO: 265 K/UL — SIGNIFICANT CHANGE UP (ref 150–400)
PLATELET # BLD AUTO: 266 K/UL — SIGNIFICANT CHANGE UP (ref 150–400)
PLATELET # BLD AUTO: 268 K/UL — SIGNIFICANT CHANGE UP (ref 150–400)
PLATELET # BLD AUTO: 274 K/UL — SIGNIFICANT CHANGE UP (ref 150–400)
PLATELET # BLD AUTO: 278 K/UL — SIGNIFICANT CHANGE UP (ref 150–400)
PLATELET # BLD AUTO: 287 K/UL — SIGNIFICANT CHANGE UP (ref 150–400)
PLATELET # BLD AUTO: 288 K/UL — SIGNIFICANT CHANGE UP (ref 150–400)
PLATELET # BLD AUTO: 297 K/UL — SIGNIFICANT CHANGE UP (ref 150–400)
PLATELET # BLD AUTO: 298 K/UL — SIGNIFICANT CHANGE UP (ref 150–400)
PLATELET # BLD AUTO: 309 K/UL — SIGNIFICANT CHANGE UP (ref 150–400)
PLATELET # BLD AUTO: 316 K/UL — SIGNIFICANT CHANGE UP (ref 150–400)
PLATELET # BLD AUTO: 320 K/UL — SIGNIFICANT CHANGE UP (ref 150–400)
PLATELET # BLD AUTO: 355 K/UL — SIGNIFICANT CHANGE UP (ref 150–400)
PLATELET # BLD AUTO: 382 K/UL — SIGNIFICANT CHANGE UP (ref 150–400)
PLATELET # BLD AUTO: 475 K/UL — HIGH (ref 150–400)
PLATELET # BLD AUTO: 498 K/UL — HIGH (ref 150–400)
PLATELET # BLD AUTO: 503 K/UL — HIGH (ref 150–400)
PLATELET # BLD AUTO: 525 K/UL — HIGH (ref 150–400)
PLATELET # BLD AUTO: 548 K/UL — HIGH (ref 150–400)
PLATELET # BLD AUTO: 576 K/UL — HIGH (ref 150–400)
PLATELET # BLD AUTO: 650 K/UL — HIGH (ref 150–400)
PLATELET CLUMP BLD QL SMEAR: ABNORMAL
PLATELET COUNT - ESTIMATE: ABNORMAL
PLATELET COUNT - ESTIMATE: NORMAL — SIGNIFICANT CHANGE UP
PO2 BLDV: 43 MMHG — SIGNIFICANT CHANGE UP
PO2 BLDV: 85 MMHG — SIGNIFICANT CHANGE UP
PO2 BLDV: 87 MMHG — SIGNIFICANT CHANGE UP
POIKILOCYTOSIS BLD QL AUTO: SIGNIFICANT CHANGE UP
POIKILOCYTOSIS BLD QL AUTO: SLIGHT — SIGNIFICANT CHANGE UP
POLYCHROMASIA BLD QL SMEAR: SLIGHT — SIGNIFICANT CHANGE UP
POTASSIUM BLDV-SCNC: 2.9 MMOL/L — CRITICAL LOW (ref 3.5–5.1)
POTASSIUM BLDV-SCNC: 3.5 MMOL/L — SIGNIFICANT CHANGE UP (ref 3.5–5.1)
POTASSIUM BLDV-SCNC: 3.6 MMOL/L — SIGNIFICANT CHANGE UP (ref 3.5–5.1)
POTASSIUM SERPL-MCNC: 3 MMOL/L — LOW (ref 3.5–5.3)
POTASSIUM SERPL-MCNC: 3.4 MMOL/L — LOW (ref 3.5–5.3)
POTASSIUM SERPL-MCNC: 3.5 MMOL/L — SIGNIFICANT CHANGE UP (ref 3.5–5.3)
POTASSIUM SERPL-MCNC: 3.6 MMOL/L — SIGNIFICANT CHANGE UP (ref 3.5–5.3)
POTASSIUM SERPL-MCNC: 3.6 MMOL/L — SIGNIFICANT CHANGE UP (ref 3.5–5.3)
POTASSIUM SERPL-MCNC: 3.7 MMOL/L — SIGNIFICANT CHANGE UP (ref 3.5–5.3)
POTASSIUM SERPL-MCNC: 3.8 MMOL/L — SIGNIFICANT CHANGE UP (ref 3.5–5.3)
POTASSIUM SERPL-MCNC: 3.8 MMOL/L — SIGNIFICANT CHANGE UP (ref 3.5–5.3)
POTASSIUM SERPL-MCNC: 3.9 MMOL/L — SIGNIFICANT CHANGE UP (ref 3.5–5.3)
POTASSIUM SERPL-MCNC: 4 MMOL/L — SIGNIFICANT CHANGE UP (ref 3.5–5.3)
POTASSIUM SERPL-MCNC: 4.1 MMOL/L — SIGNIFICANT CHANGE UP (ref 3.5–5.3)
POTASSIUM SERPL-MCNC: 4.2 MMOL/L — SIGNIFICANT CHANGE UP (ref 3.5–5.3)
POTASSIUM SERPL-MCNC: 4.2 MMOL/L — SIGNIFICANT CHANGE UP (ref 3.5–5.3)
POTASSIUM SERPL-MCNC: 4.3 MMOL/L — SIGNIFICANT CHANGE UP (ref 3.5–5.3)
POTASSIUM SERPL-MCNC: 4.4 MMOL/L — SIGNIFICANT CHANGE UP (ref 3.5–5.3)
POTASSIUM SERPL-MCNC: 4.6 MMOL/L — SIGNIFICANT CHANGE UP (ref 3.5–5.3)
POTASSIUM SERPL-MCNC: 4.7 MMOL/L — SIGNIFICANT CHANGE UP (ref 3.5–5.3)
POTASSIUM SERPL-MCNC: 4.8 MMOL/L — SIGNIFICANT CHANGE UP (ref 3.5–5.3)
POTASSIUM SERPL-MCNC: 5.1 MMOL/L — SIGNIFICANT CHANGE UP (ref 3.5–5.3)
POTASSIUM SERPL-SCNC: 3 MMOL/L — LOW (ref 3.5–5.3)
POTASSIUM SERPL-SCNC: 3.4 MMOL/L — LOW (ref 3.5–5.3)
POTASSIUM SERPL-SCNC: 3.5 MMOL/L — SIGNIFICANT CHANGE UP (ref 3.5–5.3)
POTASSIUM SERPL-SCNC: 3.6 MMOL/L — SIGNIFICANT CHANGE UP (ref 3.5–5.3)
POTASSIUM SERPL-SCNC: 3.6 MMOL/L — SIGNIFICANT CHANGE UP (ref 3.5–5.3)
POTASSIUM SERPL-SCNC: 3.7 MMOL/L — SIGNIFICANT CHANGE UP (ref 3.5–5.3)
POTASSIUM SERPL-SCNC: 3.8 MMOL/L — SIGNIFICANT CHANGE UP (ref 3.5–5.3)
POTASSIUM SERPL-SCNC: 3.8 MMOL/L — SIGNIFICANT CHANGE UP (ref 3.5–5.3)
POTASSIUM SERPL-SCNC: 3.9 MMOL/L — SIGNIFICANT CHANGE UP (ref 3.5–5.3)
POTASSIUM SERPL-SCNC: 4 MMOL/L — SIGNIFICANT CHANGE UP (ref 3.5–5.3)
POTASSIUM SERPL-SCNC: 4.1 MMOL/L
POTASSIUM SERPL-SCNC: 4.1 MMOL/L — SIGNIFICANT CHANGE UP (ref 3.5–5.3)
POTASSIUM SERPL-SCNC: 4.2 MMOL/L — SIGNIFICANT CHANGE UP (ref 3.5–5.3)
POTASSIUM SERPL-SCNC: 4.2 MMOL/L — SIGNIFICANT CHANGE UP (ref 3.5–5.3)
POTASSIUM SERPL-SCNC: 4.3 MMOL/L
POTASSIUM SERPL-SCNC: 4.3 MMOL/L
POTASSIUM SERPL-SCNC: 4.3 MMOL/L — SIGNIFICANT CHANGE UP (ref 3.5–5.3)
POTASSIUM SERPL-SCNC: 4.4 MMOL/L
POTASSIUM SERPL-SCNC: 4.4 MMOL/L — SIGNIFICANT CHANGE UP (ref 3.5–5.3)
POTASSIUM SERPL-SCNC: 4.6 MMOL/L — SIGNIFICANT CHANGE UP (ref 3.5–5.3)
POTASSIUM SERPL-SCNC: 4.7 MMOL/L — SIGNIFICANT CHANGE UP (ref 3.5–5.3)
POTASSIUM SERPL-SCNC: 4.8 MMOL/L — SIGNIFICANT CHANGE UP (ref 3.5–5.3)
POTASSIUM SERPL-SCNC: 5.1 MMOL/L — SIGNIFICANT CHANGE UP (ref 3.5–5.3)
PROCALCITONIN SERPL-MCNC: 0.12 NG/ML — HIGH (ref 0.02–0.1)
PROCALCITONIN SERPL-MCNC: 0.21 NG/ML — HIGH (ref 0.02–0.1)
PROCALCITONIN SERPL-MCNC: 0.22 NG/ML — HIGH (ref 0.02–0.1)
PROMYELOCYTES # FLD: 0.9 % — HIGH (ref 0–0)
PROT ?TM UR-MCNC: 126 MG/DL — SIGNIFICANT CHANGE UP
PROT ?TM UR-MCNC: 36 MG/DL — SIGNIFICANT CHANGE UP
PROT ?TM UR-MCNC: 63 MG/DL — SIGNIFICANT CHANGE UP
PROT FLD-MCNC: 2.9 G/DL — SIGNIFICANT CHANGE UP
PROT SERPL-MCNC: 5.5 G/DL — LOW (ref 6–8.3)
PROT SERPL-MCNC: 5.5 G/DL — LOW (ref 6–8.3)
PROT SERPL-MCNC: 5.6 G/DL — LOW (ref 6–8.3)
PROT SERPL-MCNC: 5.6 G/DL — LOW (ref 6–8.3)
PROT SERPL-MCNC: 5.7 G/DL — LOW (ref 6–8.3)
PROT SERPL-MCNC: 5.7 G/DL — LOW (ref 6–8.3)
PROT SERPL-MCNC: 5.8 G/DL — LOW (ref 6–8.3)
PROT SERPL-MCNC: 5.8 G/DL — LOW (ref 6–8.3)
PROT SERPL-MCNC: 5.9 G/DL — LOW (ref 6–8.3)
PROT SERPL-MCNC: 6 G/DL — SIGNIFICANT CHANGE UP (ref 6–8.3)
PROT SERPL-MCNC: 6 G/DL — SIGNIFICANT CHANGE UP (ref 6–8.3)
PROT SERPL-MCNC: 6.1 G/DL — SIGNIFICANT CHANGE UP (ref 6–8.3)
PROT SERPL-MCNC: 6.3 G/DL — SIGNIFICANT CHANGE UP (ref 6–8.3)
PROT SERPL-MCNC: 6.6 G/DL
PROT SERPL-MCNC: 6.8 G/DL — SIGNIFICANT CHANGE UP (ref 6–8.3)
PROT SERPL-MCNC: 7.1 G/DL
PROT SERPL-MCNC: 7.2 G/DL
PROT SERPL-MCNC: 7.2 G/DL
PROT SERPL-MCNC: 7.9 G/DL — SIGNIFICANT CHANGE UP (ref 6–8.3)
PROT SERPL-MCNC: 8.2 G/DL — SIGNIFICANT CHANGE UP (ref 6–8.3)
PROT UR-MCNC: ABNORMAL
PROT/CREAT UR-RTO: 0.6 RATIO — HIGH (ref 0–0.2)
PROT/CREAT UR-RTO: 0.7 RATIO — HIGH (ref 0–0.2)
PROT/CREAT UR-RTO: 1 RATIO — HIGH (ref 0–0.2)
PROTEINASE3 AB FLD-ACNC: <5 UNITS — SIGNIFICANT CHANGE UP
PROTEINASE3 AB SER-ACNC: NEGATIVE — SIGNIFICANT CHANGE UP
PROTHROM AB SERPL-ACNC: 13.9 SEC — HIGH (ref 10.5–13.4)
PROTHROM AB SERPL-ACNC: 14.8 SEC — HIGH (ref 10.5–13.4)
PROTHROM AB SERPL-ACNC: 15.2 SEC — HIGH (ref 10.5–13.4)
PROTHROM AB SERPL-ACNC: 15.8 SEC — HIGH (ref 10.5–13.4)
PROTHROM AB SERPL-ACNC: 17.1 SEC — HIGH (ref 10.5–13.4)
PROTHROM AB SERPL-ACNC: 18.6 SEC — HIGH (ref 10.5–13.4)
PT BLD: 15.2 SEC
QUANT TB PLUS MITOGEN MINUS NIL: 0.79 IU/ML — SIGNIFICANT CHANGE UP
QUANT TB PLUS MITOGEN MINUS NIL: 2.26 IU/ML — SIGNIFICANT CHANGE UP
RAPID RVP RESULT: DETECTED
RAPID RVP RESULT: SIGNIFICANT CHANGE UP
RBC # BLD: 2.36 M/UL — LOW (ref 3.8–5.2)
RBC # BLD: 2.58 M/UL — LOW (ref 3.8–5.2)
RBC # BLD: 2.65 M/UL — LOW (ref 3.8–5.2)
RBC # BLD: 2.74 M/UL — LOW (ref 3.8–5.2)
RBC # BLD: 2.8 M/UL — LOW (ref 3.8–5.2)
RBC # BLD: 2.8 M/UL — LOW (ref 3.8–5.2)
RBC # BLD: 2.85 M/UL — LOW (ref 3.8–5.2)
RBC # BLD: 2.87 M/UL — LOW (ref 3.8–5.2)
RBC # BLD: 2.96 M/UL — LOW (ref 3.8–5.2)
RBC # BLD: 3 M/UL — LOW (ref 3.8–5.2)
RBC # BLD: 3.04 M/UL — LOW (ref 3.8–5.2)
RBC # BLD: 3.08 M/UL — LOW (ref 3.8–5.2)
RBC # BLD: 3.08 M/UL — LOW (ref 3.8–5.2)
RBC # BLD: 3.1 M/UL — LOW (ref 3.8–5.2)
RBC # BLD: 3.11 M/UL — LOW (ref 3.8–5.2)
RBC # BLD: 3.14 M/UL — LOW (ref 3.8–5.2)
RBC # BLD: 3.14 M/UL — LOW (ref 3.8–5.2)
RBC # BLD: 3.19 M/UL — LOW (ref 3.8–5.2)
RBC # BLD: 3.23 M/UL — LOW (ref 3.8–5.2)
RBC # BLD: 3.24 M/UL — LOW (ref 3.8–5.2)
RBC # BLD: 3.28 M/UL — LOW (ref 3.8–5.2)
RBC # BLD: 3.28 M/UL — LOW (ref 3.8–5.2)
RBC # BLD: 3.31 M/UL — LOW (ref 3.8–5.2)
RBC # BLD: 3.35 M/UL — LOW (ref 3.8–5.2)
RBC # BLD: 3.41 M/UL — LOW (ref 3.8–5.2)
RBC # BLD: 3.42 M/UL — LOW (ref 3.8–5.2)
RBC # BLD: 3.42 M/UL — LOW (ref 3.8–5.2)
RBC # BLD: 3.44 M/UL — LOW (ref 3.8–5.2)
RBC # BLD: 3.56 M/UL — LOW (ref 3.8–5.2)
RBC # BLD: 3.65 M/UL — LOW (ref 3.8–5.2)
RBC # BLD: 3.7 M/UL — LOW (ref 3.8–5.2)
RBC # BLD: 3.71 M/UL — LOW (ref 3.8–5.2)
RBC # BLD: 3.73 M/UL — LOW (ref 3.8–5.2)
RBC # BLD: 3.91 M/UL — SIGNIFICANT CHANGE UP (ref 3.8–5.2)
RBC # FLD: 16.9 % — HIGH (ref 10.3–14.5)
RBC # FLD: 16.9 % — HIGH (ref 10.3–14.5)
RBC # FLD: 17.1 % — HIGH (ref 10.3–14.5)
RBC # FLD: 17.3 % — HIGH (ref 10.3–14.5)
RBC # FLD: 17.3 % — HIGH (ref 10.3–14.5)
RBC # FLD: 17.4 % — HIGH (ref 10.3–14.5)
RBC # FLD: 18.1 % — HIGH (ref 10.3–14.5)
RBC # FLD: 18.6 % — HIGH (ref 10.3–14.5)
RBC # FLD: 18.6 % — HIGH (ref 10.3–14.5)
RBC # FLD: 18.7 % — HIGH (ref 10.3–14.5)
RBC # FLD: 18.8 % — HIGH (ref 10.3–14.5)
RBC # FLD: 18.8 % — HIGH (ref 10.3–14.5)
RBC # FLD: 18.9 % — HIGH (ref 10.3–14.5)
RBC # FLD: 19.1 % — HIGH (ref 10.3–14.5)
RBC # FLD: 19.2 % — HIGH (ref 10.3–14.5)
RBC # FLD: 19.3 % — HIGH (ref 10.3–14.5)
RBC # FLD: 19.4 % — HIGH (ref 10.3–14.5)
RBC # FLD: 19.5 % — HIGH (ref 10.3–14.5)
RBC # FLD: 19.6 % — HIGH (ref 10.3–14.5)
RBC # FLD: 19.6 % — HIGH (ref 10.3–14.5)
RBC # FLD: 19.7 % — HIGH (ref 10.3–14.5)
RBC # FLD: 19.8 % — HIGH (ref 10.3–14.5)
RBC # FLD: 19.9 % — HIGH (ref 10.3–14.5)
RBC # FLD: 20 % — HIGH (ref 10.3–14.5)
RBC BLD AUTO: ABNORMAL
RBC BLD AUTO: NORMAL — SIGNIFICANT CHANGE UP
RBC CASTS # UR COMP ASSIST: 1 /HPF — SIGNIFICANT CHANGE UP (ref 0–4)
RBC CASTS # UR COMP ASSIST: 12 /HPF — HIGH (ref 0–4)
RCV VOL RI: HIGH CELLS/UL (ref 0–5)
RETICS #: 113.4 K/UL — SIGNIFICANT CHANGE UP (ref 25–125)
RETICS/RBC NFR: 3.6 % — HIGH (ref 0.5–2.5)
RH IG SCN BLD-IMP: POSITIVE — SIGNIFICANT CHANGE UP
RSV RNA NPH QL NAA+NON-PROBE: SIGNIFICANT CHANGE UP
RSV RNA SPEC QL NAA+PROBE: SIGNIFICANT CHANGE UP
RSV RNA SPEC QL NAA+PROBE: SIGNIFICANT CHANGE UP
RV+EV RNA SPEC QL NAA+PROBE: SIGNIFICANT CHANGE UP
RV+EV RNA SPEC QL NAA+PROBE: SIGNIFICANT CHANGE UP
S AUREUS DNA NOSE QL NAA+PROBE: SIGNIFICANT CHANGE UP
S PNEUM SEROTYPE IGG SER-IMP: 0.7 MCG/ML — SIGNIFICANT CHANGE UP
S PNEUM SEROTYPE IGG SER-IMP: 0.7 MCG/ML — SIGNIFICANT CHANGE UP
S PNEUM SEROTYPE IGG SER-IMP: 0.9 MCG/ML — SIGNIFICANT CHANGE UP
S PNEUM SEROTYPE IGG SER-IMP: 1 MCG/ML — SIGNIFICANT CHANGE UP
S PNEUM SEROTYPE IGG SER-IMP: 1.8 MCG/ML — SIGNIFICANT CHANGE UP
S PNEUM SEROTYPE IGG SER-IMP: 1.9 MCG/ML — SIGNIFICANT CHANGE UP
S PNEUM SEROTYPE IGG SER-IMP: 11.5 MCG/ML — SIGNIFICANT CHANGE UP
SAO2 % BLDV: 72 % — SIGNIFICANT CHANGE UP
SAO2 % BLDV: 97 % — SIGNIFICANT CHANGE UP
SAO2 % BLDV: 98.3 % — SIGNIFICANT CHANGE UP
SARS-COV-2 RNA SPEC QL NAA+PROBE: DETECTED
SARS-COV-2 RNA SPEC QL NAA+PROBE: DETECTED
SARS-COV-2 RNA SPEC QL NAA+PROBE: SIGNIFICANT CHANGE UP
SMUDGE CELLS # BLD: PRESENT — SIGNIFICANT CHANGE UP
SODIUM SERPL-SCNC: 131 MMOL/L — LOW (ref 135–145)
SODIUM SERPL-SCNC: 131 MMOL/L — LOW (ref 135–145)
SODIUM SERPL-SCNC: 132 MMOL/L — LOW (ref 135–145)
SODIUM SERPL-SCNC: 133 MMOL/L — LOW (ref 135–145)
SODIUM SERPL-SCNC: 134 MMOL/L — LOW (ref 135–145)
SODIUM SERPL-SCNC: 135 MMOL/L — SIGNIFICANT CHANGE UP (ref 135–145)
SODIUM SERPL-SCNC: 136 MMOL/L
SODIUM SERPL-SCNC: 136 MMOL/L — SIGNIFICANT CHANGE UP (ref 135–145)
SODIUM SERPL-SCNC: 136 MMOL/L — SIGNIFICANT CHANGE UP (ref 135–145)
SODIUM SERPL-SCNC: 137 MMOL/L — SIGNIFICANT CHANGE UP (ref 135–145)
SODIUM SERPL-SCNC: 138 MMOL/L — SIGNIFICANT CHANGE UP (ref 135–145)
SODIUM SERPL-SCNC: 139 MMOL/L
SODIUM SERPL-SCNC: 139 MMOL/L — SIGNIFICANT CHANGE UP (ref 135–145)
SODIUM SERPL-SCNC: 139 MMOL/L — SIGNIFICANT CHANGE UP (ref 135–145)
SODIUM SERPL-SCNC: 140 MMOL/L — SIGNIFICANT CHANGE UP (ref 135–145)
SODIUM SERPL-SCNC: 140 MMOL/L — SIGNIFICANT CHANGE UP (ref 135–145)
SODIUM SERPL-SCNC: 143 MMOL/L
SODIUM SERPL-SCNC: 144 MMOL/L
SODIUM SERPL-SCNC: 144 MMOL/L — SIGNIFICANT CHANGE UP (ref 135–145)
SODIUM UR-SCNC: 227 MMOL/L — SIGNIFICANT CHANGE UP
SP GR SPEC: 1.02 — SIGNIFICANT CHANGE UP (ref 1.01–1.05)
SP GR SPEC: 1.04 — SIGNIFICANT CHANGE UP (ref 1.01–1.05)
SP GR SPEC: >1.03 — SIGNIFICANT CHANGE UP (ref 1.01–1.05)
SPECIMEN SOURCE: SIGNIFICANT CHANGE UP
STOMATOCYTES BLD QL SMEAR: SLIGHT — SIGNIFICANT CHANGE UP
T-CELL CD4 SUBSET PNL BLD: 445 /UL — SIGNIFICANT CHANGE UP (ref 325–1251)
TIBC SERPL-MCNC: 159 UG/DL — LOW (ref 220–430)
TIBC SERPL-MCNC: <127 UG/DL — LOW (ref 220–430)
TM INTERPRETATION: SIGNIFICANT CHANGE UP
TOTAL NUCLEATED CELL COUNT, BODY FLUID: 338 CELLS/UL — HIGH (ref 0–5)
TRIGL FLD-MCNC: 28 MG/DL — SIGNIFICANT CHANGE UP
TRIGL SERPL-MCNC: 81 MG/DL — SIGNIFICANT CHANGE UP
TROPONIN T, HIGH SENSITIVITY RESULT: 10 NG/L — SIGNIFICANT CHANGE UP
TROPONIN T, HIGH SENSITIVITY RESULT: 10 NG/L — SIGNIFICANT CHANGE UP
TUBE TYPE: SIGNIFICANT CHANGE UP
UFH PPP CHRO-ACNC: 0 IU/ML — LOW (ref 0.3–0.7)
UIBC SERPL-MCNC: 134 UG/DL — SIGNIFICANT CHANGE UP (ref 110–370)
UIBC SERPL-MCNC: <30 UG/DL — LOW (ref 110–370)
URATE SERPL-MCNC: 2.7 MG/DL — SIGNIFICANT CHANGE UP (ref 2.5–7)
URATE SERPL-MCNC: 2.8 MG/DL — SIGNIFICANT CHANGE UP (ref 2.5–7)
URATE SERPL-MCNC: 2.9 MG/DL — SIGNIFICANT CHANGE UP (ref 2.5–7)
URATE SERPL-MCNC: 3.2 MG/DL — SIGNIFICANT CHANGE UP (ref 2.5–7)
URATE SERPL-MCNC: 3.7 MG/DL — SIGNIFICANT CHANGE UP (ref 2.5–7)
URATE SERPL-MCNC: 3.9 MG/DL — SIGNIFICANT CHANGE UP (ref 2.5–7)
URATE SERPL-MCNC: 4.1 MG/DL
URATE SERPL-MCNC: 4.1 MG/DL
URATE SERPL-MCNC: 4.4 MG/DL
URATE SERPL-MCNC: 4.5 MG/DL — SIGNIFICANT CHANGE UP (ref 2.5–7)
URATE SERPL-MCNC: 5.1 MG/DL
URATE SERPL-MCNC: 5.9 MG/DL — SIGNIFICANT CHANGE UP (ref 2.5–7)
UROBILINOGEN FLD QL: ABNORMAL
UROBILINOGEN FLD QL: ABNORMAL
UROBILINOGEN FLD QL: SIGNIFICANT CHANGE UP
VARIANT LYMPHS # BLD: 1 % — SIGNIFICANT CHANGE UP (ref 0–6)
VARIANT LYMPHS # BLD: 2.6 % — SIGNIFICANT CHANGE UP (ref 0–6)
VARIANT LYMPHS # BLD: 4.4 % — SIGNIFICANT CHANGE UP (ref 0–6)
VIT B12 SERPL-MCNC: 1527 PG/ML
VIT B12 SERPL-MCNC: 2000 PG/ML — HIGH (ref 200–900)
VIT B12 SERPL-MCNC: >2000 PG/ML
VIT D25+D1,25 OH+D1,25 PNL SERPL-MCNC: 62.1 PG/ML — SIGNIFICANT CHANGE UP (ref 19.9–79.3)
WBC # BLD: 10.07 K/UL — SIGNIFICANT CHANGE UP (ref 3.8–10.5)
WBC # BLD: 10.68 K/UL — HIGH (ref 3.8–10.5)
WBC # BLD: 10.72 K/UL — HIGH (ref 3.8–10.5)
WBC # BLD: 10.89 K/UL — HIGH (ref 3.8–10.5)
WBC # BLD: 11.41 K/UL — HIGH (ref 3.8–10.5)
WBC # BLD: 11.47 K/UL — HIGH (ref 3.8–10.5)
WBC # BLD: 11.57 K/UL — HIGH (ref 3.8–10.5)
WBC # BLD: 11.62 K/UL — HIGH (ref 3.8–10.5)
WBC # BLD: 11.69 K/UL — HIGH (ref 3.8–10.5)
WBC # BLD: 11.7 K/UL — HIGH (ref 3.8–10.5)
WBC # BLD: 11.71 K/UL — HIGH (ref 3.8–10.5)
WBC # BLD: 11.85 K/UL — HIGH (ref 3.8–10.5)
WBC # BLD: 12.19 K/UL — HIGH (ref 3.8–10.5)
WBC # BLD: 12.35 K/UL — HIGH (ref 3.8–10.5)
WBC # BLD: 12.66 K/UL — HIGH (ref 3.8–10.5)
WBC # BLD: 12.82 K/UL — HIGH (ref 3.8–10.5)
WBC # BLD: 12.83 K/UL — HIGH (ref 3.8–10.5)
WBC # BLD: 12.84 K/UL — HIGH (ref 3.8–10.5)
WBC # BLD: 13.59 K/UL — HIGH (ref 3.8–10.5)
WBC # BLD: 16.19 K/UL — HIGH (ref 3.8–10.5)
WBC # BLD: 18.1 K/UL — HIGH (ref 3.8–10.5)
WBC # BLD: 18.17 K/UL — HIGH (ref 3.8–10.5)
WBC # BLD: 22.07 K/UL — HIGH (ref 3.8–10.5)
WBC # BLD: 5.5 K/UL — SIGNIFICANT CHANGE UP (ref 3.8–10.5)
WBC # BLD: 6.99 K/UL — SIGNIFICANT CHANGE UP (ref 3.8–10.5)
WBC # BLD: 7.06 K/UL — SIGNIFICANT CHANGE UP (ref 3.8–10.5)
WBC # BLD: 7.52 K/UL — SIGNIFICANT CHANGE UP (ref 3.8–10.5)
WBC # BLD: 7.71 K/UL — SIGNIFICANT CHANGE UP (ref 3.8–10.5)
WBC # BLD: 8.07 K/UL — SIGNIFICANT CHANGE UP (ref 3.8–10.5)
WBC # BLD: 8.32 K/UL — SIGNIFICANT CHANGE UP (ref 3.8–10.5)
WBC # BLD: 8.36 K/UL — SIGNIFICANT CHANGE UP (ref 3.8–10.5)
WBC # BLD: 8.53 K/UL — SIGNIFICANT CHANGE UP (ref 3.8–10.5)
WBC # BLD: 8.88 K/UL — SIGNIFICANT CHANGE UP (ref 3.8–10.5)
WBC # BLD: 8.88 K/UL — SIGNIFICANT CHANGE UP (ref 3.8–10.5)
WBC # BLD: 9.61 K/UL — SIGNIFICANT CHANGE UP (ref 3.8–10.5)
WBC # BLD: 9.93 K/UL — SIGNIFICANT CHANGE UP (ref 3.8–10.5)
WBC # FLD AUTO: 10.07 K/UL — SIGNIFICANT CHANGE UP (ref 3.8–10.5)
WBC # FLD AUTO: 10.68 K/UL — HIGH (ref 3.8–10.5)
WBC # FLD AUTO: 10.72 K/UL — HIGH (ref 3.8–10.5)
WBC # FLD AUTO: 10.89 K/UL — HIGH (ref 3.8–10.5)
WBC # FLD AUTO: 11.41 K/UL — HIGH (ref 3.8–10.5)
WBC # FLD AUTO: 11.47 K/UL — HIGH (ref 3.8–10.5)
WBC # FLD AUTO: 11.57 K/UL — HIGH (ref 3.8–10.5)
WBC # FLD AUTO: 11.62 K/UL — HIGH (ref 3.8–10.5)
WBC # FLD AUTO: 11.69 K/UL — HIGH (ref 3.8–10.5)
WBC # FLD AUTO: 11.7 K/UL — HIGH (ref 3.8–10.5)
WBC # FLD AUTO: 11.71 K/UL — HIGH (ref 3.8–10.5)
WBC # FLD AUTO: 11.85 K/UL — HIGH (ref 3.8–10.5)
WBC # FLD AUTO: 12.19 K/UL — HIGH (ref 3.8–10.5)
WBC # FLD AUTO: 12.35 K/UL — HIGH (ref 3.8–10.5)
WBC # FLD AUTO: 12.66 K/UL — HIGH (ref 3.8–10.5)
WBC # FLD AUTO: 12.82 K/UL — HIGH (ref 3.8–10.5)
WBC # FLD AUTO: 12.83 K/UL — HIGH (ref 3.8–10.5)
WBC # FLD AUTO: 12.84 K/UL — HIGH (ref 3.8–10.5)
WBC # FLD AUTO: 13.59 K/UL — HIGH (ref 3.8–10.5)
WBC # FLD AUTO: 16.19 K/UL — HIGH (ref 3.8–10.5)
WBC # FLD AUTO: 18.1 K/UL — HIGH (ref 3.8–10.5)
WBC # FLD AUTO: 18.17 K/UL — HIGH (ref 3.8–10.5)
WBC # FLD AUTO: 22.07 K/UL — HIGH (ref 3.8–10.5)
WBC # FLD AUTO: 5.5 K/UL — SIGNIFICANT CHANGE UP (ref 3.8–10.5)
WBC # FLD AUTO: 6.99 K/UL — SIGNIFICANT CHANGE UP (ref 3.8–10.5)
WBC # FLD AUTO: 7.06 K/UL — SIGNIFICANT CHANGE UP (ref 3.8–10.5)
WBC # FLD AUTO: 7.52 K/UL — SIGNIFICANT CHANGE UP (ref 3.8–10.5)
WBC # FLD AUTO: 7.71 K/UL — SIGNIFICANT CHANGE UP (ref 3.8–10.5)
WBC # FLD AUTO: 8.07 K/UL — SIGNIFICANT CHANGE UP (ref 3.8–10.5)
WBC # FLD AUTO: 8.32 K/UL — SIGNIFICANT CHANGE UP (ref 3.8–10.5)
WBC # FLD AUTO: 8.36 K/UL — SIGNIFICANT CHANGE UP (ref 3.8–10.5)
WBC # FLD AUTO: 8.53 K/UL — SIGNIFICANT CHANGE UP (ref 3.8–10.5)
WBC # FLD AUTO: 8.88 K/UL — SIGNIFICANT CHANGE UP (ref 3.8–10.5)
WBC # FLD AUTO: 8.88 K/UL — SIGNIFICANT CHANGE UP (ref 3.8–10.5)
WBC # FLD AUTO: 9.61 K/UL — SIGNIFICANT CHANGE UP (ref 3.8–10.5)
WBC # FLD AUTO: 9.93 K/UL — SIGNIFICANT CHANGE UP (ref 3.8–10.5)
WBC UR QL: 11 /HPF — HIGH (ref 0–5)
WBC UR QL: 2 /HPF — SIGNIFICANT CHANGE UP (ref 0–5)

## 2022-01-01 PROCEDURE — 70491 CT SOFT TISSUE NECK W/DYE: CPT | Mod: 26,MH

## 2022-01-01 PROCEDURE — 32674 THORACOSCOPY LYMPH NODE EXC: CPT

## 2022-01-01 PROCEDURE — 88305 TISSUE EXAM BY PATHOLOGIST: CPT | Mod: 26

## 2022-01-01 PROCEDURE — 71046 X-RAY EXAM CHEST 2 VIEWS: CPT | Mod: 26

## 2022-01-01 PROCEDURE — 71045 X-RAY EXAM CHEST 1 VIEW: CPT | Mod: 26

## 2022-01-01 PROCEDURE — 94729 DIFFUSING CAPACITY: CPT

## 2022-01-01 PROCEDURE — 99222 1ST HOSP IP/OBS MODERATE 55: CPT | Mod: GC

## 2022-01-01 PROCEDURE — 99203 OFFICE O/P NEW LOW 30 MIN: CPT | Mod: 25

## 2022-01-01 PROCEDURE — 88367 INSITU HYBRIDIZATION AUTO: CPT | Mod: 26

## 2022-01-01 PROCEDURE — 88364 INSITU HYBRIDIZATION (FISH): CPT | Mod: 26

## 2022-01-01 PROCEDURE — 71260 CT THORAX DX C+: CPT | Mod: 26,MH

## 2022-01-01 PROCEDURE — 32608 THORACOSCOPY W/BX NODULE: CPT | Mod: RT

## 2022-01-01 PROCEDURE — 99232 SBSQ HOSP IP/OBS MODERATE 35: CPT | Mod: GC

## 2022-01-01 PROCEDURE — 99223 1ST HOSP IP/OBS HIGH 75: CPT | Mod: GC,25

## 2022-01-01 PROCEDURE — 99222 1ST HOSP IP/OBS MODERATE 55: CPT

## 2022-01-01 PROCEDURE — 99213 OFFICE O/P EST LOW 20 MIN: CPT | Mod: 25,GC

## 2022-01-01 PROCEDURE — 99233 SBSQ HOSP IP/OBS HIGH 50: CPT

## 2022-01-01 PROCEDURE — 77012 CT SCAN FOR NEEDLE BIOPSY: CPT | Mod: 26

## 2022-01-01 PROCEDURE — 86900 BLOOD TYPING SEROLOGIC ABO: CPT

## 2022-01-01 PROCEDURE — 85025 COMPLETE CBC W/AUTO DIFF WBC: CPT | Mod: GC

## 2022-01-01 PROCEDURE — 38220 DX BONE MARROW ASPIRATIONS: CPT | Mod: LT,59

## 2022-01-01 PROCEDURE — 99214 OFFICE O/P EST MOD 30 MIN: CPT | Mod: GC,25

## 2022-01-01 PROCEDURE — 88342 IMHCHEM/IMCYTCHM 1ST ANTB: CPT | Mod: 26

## 2022-01-01 PROCEDURE — 94010 BREATHING CAPACITY TEST: CPT

## 2022-01-01 PROCEDURE — 74177 CT ABD & PELVIS W/CONTRAST: CPT | Mod: 26,MH

## 2022-01-01 PROCEDURE — 71260 CT THORAX DX C+: CPT

## 2022-01-01 PROCEDURE — 88173 CYTOPATH EVAL FNA REPORT: CPT | Mod: 26

## 2022-01-01 PROCEDURE — 71260 CT THORAX DX C+: CPT | Mod: MG

## 2022-01-01 PROCEDURE — 88365 INSITU HYBRIDIZATION (FISH): CPT | Mod: 26,59

## 2022-01-01 PROCEDURE — 93970 EXTREMITY STUDY: CPT | Mod: 26

## 2022-01-01 PROCEDURE — 71045 X-RAY EXAM CHEST 1 VIEW: CPT | Mod: 26,77

## 2022-01-01 PROCEDURE — 94726 PLETHYSMOGRAPHY LUNG VOLUMES: CPT

## 2022-01-01 PROCEDURE — ZZZZZ: CPT | Mod: PD

## 2022-01-01 PROCEDURE — 94726 PLETHYSMOGRAPHY LUNG VOLUMES: CPT | Mod: PD

## 2022-01-01 PROCEDURE — 99231 SBSQ HOSP IP/OBS SF/LOW 25: CPT

## 2022-01-01 PROCEDURE — 88112 CYTOPATH CELL ENHANCE TECH: CPT | Mod: 26

## 2022-01-01 PROCEDURE — 88341 IMHCHEM/IMCYTCHM EA ADD ANTB: CPT | Mod: 26,59

## 2022-01-01 PROCEDURE — 71275 CT ANGIOGRAPHY CHEST: CPT | Mod: 26

## 2022-01-01 PROCEDURE — 71275 CT ANGIOGRAPHY CHEST: CPT | Mod: 26,MA

## 2022-01-01 PROCEDURE — 85060 BLOOD SMEAR INTERPRETATION: CPT

## 2022-01-01 PROCEDURE — 99214 OFFICE O/P EST MOD 30 MIN: CPT

## 2022-01-01 PROCEDURE — 99203 OFFICE O/P NEW LOW 30 MIN: CPT | Mod: 95

## 2022-01-01 PROCEDURE — 86923 COMPATIBILITY TEST ELECTRIC: CPT

## 2022-01-01 PROCEDURE — 99204 OFFICE O/P NEW MOD 45 MIN: CPT

## 2022-01-01 PROCEDURE — 93010 ELECTROCARDIOGRAM REPORT: CPT

## 2022-01-01 PROCEDURE — 99223 1ST HOSP IP/OBS HIGH 75: CPT

## 2022-01-01 PROCEDURE — 99285 EMERGENCY DEPT VISIT HI MDM: CPT | Mod: CS,GC

## 2022-01-01 PROCEDURE — 74177 CT ABD & PELVIS W/CONTRAST: CPT | Mod: 26,MG

## 2022-01-01 PROCEDURE — 86901 BLOOD TYPING SEROLOGIC RH(D): CPT

## 2022-01-01 PROCEDURE — 38221 DX BONE MARROW BIOPSIES: CPT

## 2022-01-01 PROCEDURE — 99223 1ST HOSP IP/OBS HIGH 75: CPT | Mod: FS,57

## 2022-01-01 PROCEDURE — 31652 BRONCH EBUS SAMPLNG 1/2 NODE: CPT | Mod: GC

## 2022-01-01 PROCEDURE — 32999 UNLISTED PX LUNGS & PLEURA: CPT | Mod: 26

## 2022-01-01 PROCEDURE — 86850 RBC ANTIBODY SCREEN: CPT

## 2022-01-01 PROCEDURE — 99214 OFFICE O/P EST MOD 30 MIN: CPT | Mod: GC

## 2022-01-01 PROCEDURE — 78815 PET IMAGE W/CT SKULL-THIGH: CPT | Mod: 26,PS,MH

## 2022-01-01 PROCEDURE — 71260 CT THORAX DX C+: CPT | Mod: 26,MG

## 2022-01-01 PROCEDURE — 88341 IMHCHEM/IMCYTCHM EA ADD ANTB: CPT | Mod: 26

## 2022-01-01 PROCEDURE — 88189 FLOWCYTOMETRY/READ 16 & >: CPT

## 2022-01-01 PROCEDURE — 31645 BRNCHSC W/THER ASPIR 1ST: CPT | Mod: GC

## 2022-01-01 PROCEDURE — 74177 CT ABD & PELVIS W/CONTRAST: CPT

## 2022-01-01 PROCEDURE — 78815 PET IMAGE W/CT SKULL-THIGH: CPT

## 2022-01-01 PROCEDURE — G1004: CPT

## 2022-01-01 PROCEDURE — 99223 1ST HOSP IP/OBS HIGH 75: CPT | Mod: GC

## 2022-01-01 PROCEDURE — 88342 IMHCHEM/IMCYTCHM 1ST ANTB: CPT | Mod: 26,59

## 2022-01-01 PROCEDURE — 99213 OFFICE O/P EST LOW 20 MIN: CPT | Mod: GC,25

## 2022-01-01 PROCEDURE — 70491 CT SOFT TISSUE NECK W/DYE: CPT

## 2022-01-01 PROCEDURE — 94010 BREATHING CAPACITY TEST: CPT | Mod: PD

## 2022-01-01 PROCEDURE — 99233 SBSQ HOSP IP/OBS HIGH 50: CPT | Mod: GC

## 2022-01-01 PROCEDURE — 74177 CT ABD & PELVIS W/CONTRAST: CPT | Mod: MG

## 2022-01-01 PROCEDURE — 93000 ELECTROCARDIOGRAM COMPLETE: CPT

## 2022-01-01 PROCEDURE — 88365 INSITU HYBRIDIZATION (FISH): CPT | Mod: 26

## 2022-01-01 PROCEDURE — 93306 TTE W/DOPPLER COMPLETE: CPT | Mod: 26

## 2022-01-01 PROCEDURE — A9552: CPT

## 2022-01-01 DEVICE — SEALANT VISTASEAL FIBRIN HUMAN 10ML 4/KIT: Type: IMPLANTABLE DEVICE | Site: RIGHT | Status: FUNCTIONAL

## 2022-01-01 DEVICE — SURGICEL NU-KNIT 6 X 9": Type: IMPLANTABLE DEVICE | Site: RIGHT | Status: FUNCTIONAL

## 2022-01-01 DEVICE — STAPLER ETHICON GST ECHELON 45MM GREEN RELOAD: Type: IMPLANTABLE DEVICE | Site: RIGHT | Status: FUNCTIONAL

## 2022-01-01 DEVICE — CLIP APPLIER COVIDIEN ENDOCLIP III 5MM: Type: IMPLANTABLE DEVICE | Site: RIGHT | Status: FUNCTIONAL

## 2022-01-01 DEVICE — SURGICEL FIBRILLAR 2 X 4": Type: IMPLANTABLE DEVICE | Site: RIGHT | Status: FUNCTIONAL

## 2022-01-01 DEVICE — VISTASEAL FIBRIN HUMAN 10ML: Type: IMPLANTABLE DEVICE | Site: RIGHT | Status: FUNCTIONAL

## 2022-01-01 DEVICE — CHEST DRAIN THORACIC ARGYLE PVC 20FR STRAIGHT: Type: IMPLANTABLE DEVICE | Site: RIGHT | Status: FUNCTIONAL

## 2022-01-01 RX ORDER — IPRATROPIUM/ALBUTEROL SULFATE 18-103MCG
3 AEROSOL WITH ADAPTER (GRAM) INHALATION EVERY 6 HOURS
Refills: 0 | Status: DISCONTINUED | OUTPATIENT
Start: 2022-01-01 | End: 2022-01-01

## 2022-01-01 RX ORDER — METOPROLOL TARTRATE 50 MG
25 TABLET ORAL
Refills: 0 | Status: DISCONTINUED | OUTPATIENT
Start: 2022-01-01 | End: 2022-01-01

## 2022-01-01 RX ORDER — SODIUM BICARBONATE 1 MEQ/ML
650 SYRINGE (ML) INTRAVENOUS THREE TIMES A DAY
Refills: 0 | Status: DISCONTINUED | OUTPATIENT
Start: 2022-01-01 | End: 2022-01-01

## 2022-01-01 RX ORDER — LIDOCAINE 4 G/100G
1 CREAM TOPICAL DAILY
Refills: 0 | Status: DISCONTINUED | OUTPATIENT
Start: 2022-01-01 | End: 2022-01-01

## 2022-01-01 RX ORDER — METOPROLOL TARTRATE 50 MG
12.5 TABLET ORAL ONCE
Refills: 0 | Status: COMPLETED | OUTPATIENT
Start: 2022-01-01 | End: 2022-01-01

## 2022-01-01 RX ORDER — SODIUM CHLORIDE 9 MG/ML
1000 INJECTION, SOLUTION INTRAVENOUS
Refills: 0 | Status: DISCONTINUED | OUTPATIENT
Start: 2022-01-01 | End: 2022-01-01

## 2022-01-01 RX ORDER — REMDESIVIR 5 MG/ML
INJECTION INTRAVENOUS
Refills: 0 | Status: DISCONTINUED | OUTPATIENT
Start: 2022-01-01 | End: 2022-01-01

## 2022-01-01 RX ORDER — MAGNESIUM SULFATE 500 MG/ML
2 VIAL (ML) INJECTION ONCE
Refills: 0 | Status: COMPLETED | OUTPATIENT
Start: 2022-01-01 | End: 2022-01-01

## 2022-01-01 RX ORDER — POTASSIUM PHOSPHATE, MONOBASIC POTASSIUM PHOSPHATE, DIBASIC 236; 224 MG/ML; MG/ML
15 INJECTION, SOLUTION INTRAVENOUS ONCE
Refills: 0 | Status: COMPLETED | OUTPATIENT
Start: 2022-01-01 | End: 2022-01-01

## 2022-01-01 RX ORDER — POTASSIUM CHLORIDE 20 MEQ
40 PACKET (EA) ORAL ONCE
Refills: 0 | Status: COMPLETED | OUTPATIENT
Start: 2022-01-01 | End: 2022-01-01

## 2022-01-01 RX ORDER — FOLIC ACID 1 MG/1
1 TABLET ORAL DAILY
Qty: 90 | Refills: 0 | Status: ACTIVE | COMMUNITY
Start: 2021-09-30 | End: 1900-01-01

## 2022-01-01 RX ORDER — LANOLIN ALCOHOL/MO/W.PET/CERES
1 CREAM (GRAM) TOPICAL
Qty: 0 | Refills: 0 | DISCHARGE
Start: 2022-01-01

## 2022-01-01 RX ORDER — NORMAL SALT TABLETS 1 G/G
1 TABLET ORAL
Refills: 0 | Status: ACTIVE | COMMUNITY

## 2022-01-01 RX ORDER — METOPROLOL TARTRATE 50 MG
37.5 TABLET ORAL
Refills: 0 | Status: DISCONTINUED | OUTPATIENT
Start: 2022-01-01 | End: 2022-01-01

## 2022-01-01 RX ORDER — ACETAMINOPHEN 500 MG
750 TABLET ORAL ONCE
Refills: 0 | Status: COMPLETED | OUTPATIENT
Start: 2022-01-01 | End: 2022-01-01

## 2022-01-01 RX ORDER — HYDROMORPHONE HYDROCHLORIDE 2 MG/ML
1 INJECTION INTRAMUSCULAR; INTRAVENOUS; SUBCUTANEOUS
Refills: 0 | Status: DISCONTINUED | OUTPATIENT
Start: 2022-01-01 | End: 2022-01-01

## 2022-01-01 RX ORDER — LEVALBUTEROL 1.25 MG/.5ML
2 SOLUTION, CONCENTRATE RESPIRATORY (INHALATION) EVERY 6 HOURS
Refills: 0 | Status: DISCONTINUED | OUTPATIENT
Start: 2022-01-01 | End: 2022-01-01

## 2022-01-01 RX ORDER — CEFEPIME 1 G/1
2000 INJECTION, POWDER, FOR SOLUTION INTRAMUSCULAR; INTRAVENOUS EVERY 8 HOURS
Refills: 0 | Status: DISCONTINUED | OUTPATIENT
Start: 2022-01-01 | End: 2022-01-01

## 2022-01-01 RX ORDER — SODIUM CHLORIDE 9 MG/ML
500 INJECTION, SOLUTION INTRAVENOUS ONCE
Refills: 0 | Status: COMPLETED | OUTPATIENT
Start: 2022-01-01 | End: 2022-01-01

## 2022-01-01 RX ORDER — FLUTICASONE PROPIONATE 50 MCG
1 SPRAY, SUSPENSION NASAL
Refills: 0 | Status: DISCONTINUED | OUTPATIENT
Start: 2022-01-01 | End: 2022-01-01

## 2022-01-01 RX ORDER — SIMVASTATIN 20 MG/1
10 TABLET, FILM COATED ORAL AT BEDTIME
Refills: 0 | Status: DISCONTINUED | OUTPATIENT
Start: 2022-01-01 | End: 2022-01-01

## 2022-01-01 RX ORDER — HYDROMORPHONE HYDROCHLORIDE 2 MG/ML
0.2 INJECTION INTRAMUSCULAR; INTRAVENOUS; SUBCUTANEOUS
Refills: 0 | Status: DISCONTINUED | OUTPATIENT
Start: 2022-01-01 | End: 2022-01-01

## 2022-01-01 RX ORDER — ENOXAPARIN SODIUM 100 MG/ML
40 INJECTION SUBCUTANEOUS
Qty: 0 | Refills: 0 | DISCHARGE
Start: 2022-01-01

## 2022-01-01 RX ORDER — SODIUM,POTASSIUM PHOSPHATES 278-250MG
1 POWDER IN PACKET (EA) ORAL ONCE
Refills: 0 | Status: COMPLETED | OUTPATIENT
Start: 2022-01-01 | End: 2022-01-01

## 2022-01-01 RX ORDER — PANTOPRAZOLE SODIUM 20 MG/1
40 TABLET, DELAYED RELEASE ORAL
Refills: 0 | Status: DISCONTINUED | OUTPATIENT
Start: 2022-01-01 | End: 2022-01-01

## 2022-01-01 RX ORDER — CEFTRIAXONE 500 MG/1
1000 INJECTION, POWDER, FOR SOLUTION INTRAMUSCULAR; INTRAVENOUS EVERY 24 HOURS
Refills: 0 | Status: DISCONTINUED | OUTPATIENT
Start: 2022-01-01 | End: 2022-01-01

## 2022-01-01 RX ORDER — ENOXAPARIN SODIUM 100 MG/ML
40 INJECTION SUBCUTANEOUS EVERY 24 HOURS
Refills: 0 | Status: DISCONTINUED | OUTPATIENT
Start: 2022-01-01 | End: 2022-01-01

## 2022-01-01 RX ORDER — LEVALBUTEROL 1.25 MG/.5ML
2 SOLUTION, CONCENTRATE RESPIRATORY (INHALATION)
Qty: 0 | Refills: 0 | DISCHARGE
Start: 2022-01-01

## 2022-01-01 RX ORDER — FLUTICASONE PROPIONATE 50 MCG
1 SPRAY, SUSPENSION NASAL
Qty: 1 | Refills: 0
Start: 2022-01-01 | End: 2022-01-01

## 2022-01-01 RX ORDER — ALBUTEROL 90 UG/1
2 AEROSOL, METERED ORAL
Qty: 0 | Refills: 0 | DISCHARGE
Start: 2022-01-01

## 2022-01-01 RX ORDER — AZITHROMYCIN 500 MG/1
TABLET, FILM COATED ORAL
Refills: 0 | Status: DISCONTINUED | OUTPATIENT
Start: 2022-01-01 | End: 2022-01-01

## 2022-01-01 RX ORDER — METOPROLOL TARTRATE 50 MG
1 TABLET ORAL
Qty: 0 | Refills: 0 | DISCHARGE
Start: 2022-01-01

## 2022-01-01 RX ORDER — POTASSIUM CHLORIDE 20 MEQ
40 PACKET (EA) ORAL ONCE
Refills: 0 | Status: DISCONTINUED | OUTPATIENT
Start: 2022-01-01 | End: 2022-01-01

## 2022-01-01 RX ORDER — ACETAMINOPHEN 500 MG
1000 TABLET ORAL ONCE
Refills: 0 | Status: COMPLETED | OUTPATIENT
Start: 2022-01-01 | End: 2022-01-01

## 2022-01-01 RX ORDER — IBUPROFEN 200 MG
400 TABLET ORAL ONCE
Refills: 0 | Status: COMPLETED | OUTPATIENT
Start: 2022-01-01 | End: 2022-01-01

## 2022-01-01 RX ORDER — METOPROLOL TARTRATE 50 MG
12.5 TABLET ORAL
Refills: 0 | Status: DISCONTINUED | OUTPATIENT
Start: 2022-01-01 | End: 2022-01-01

## 2022-01-01 RX ORDER — TOBRAMYCIN 0.3 %
1 DROPS OPHTHALMIC (EYE) EVERY 6 HOURS
Refills: 0 | Status: COMPLETED | OUTPATIENT
Start: 2022-01-01 | End: 2022-01-01

## 2022-01-01 RX ORDER — DEXAMETHASONE 0.5 MG/5ML
1 ELIXIR ORAL
Qty: 0 | Refills: 0 | DISCHARGE
Start: 2022-01-01 | End: 2022-01-01

## 2022-01-01 RX ORDER — FOLIC ACID 0.8 MG
1 TABLET ORAL DAILY
Refills: 0 | Status: DISCONTINUED | OUTPATIENT
Start: 2022-01-01 | End: 2022-01-01

## 2022-01-01 RX ORDER — APIXABAN 5 MG/1
5 TABLET, FILM COATED ORAL
Qty: 60 | Refills: 3 | Status: COMPLETED | COMMUNITY
Start: 2021-06-02 | End: 2022-01-01

## 2022-01-01 RX ORDER — SODIUM CHLORIDE 9 MG/ML
0 INJECTION INTRAMUSCULAR; INTRAVENOUS; SUBCUTANEOUS
Qty: 0 | Refills: 0 | DISCHARGE

## 2022-01-01 RX ORDER — SODIUM CHLORIDE 9 MG/ML
1 INJECTION INTRAMUSCULAR; INTRAVENOUS; SUBCUTANEOUS DAILY
Refills: 0 | Status: DISCONTINUED | OUTPATIENT
Start: 2022-01-01 | End: 2022-01-01

## 2022-01-01 RX ORDER — SODIUM,POTASSIUM PHOSPHATES 278-250MG
1 POWDER IN PACKET (EA) ORAL
Refills: 0 | Status: COMPLETED | OUTPATIENT
Start: 2022-01-01 | End: 2022-01-01

## 2022-01-01 RX ORDER — EZETIMIBE 10 MG/1
0 TABLET ORAL
Qty: 0 | Refills: 0 | DISCHARGE

## 2022-01-01 RX ORDER — SODIUM BICARBONATE 1 MEQ/ML
1 SYRINGE (ML) INTRAVENOUS
Qty: 0 | Refills: 0 | DISCHARGE
Start: 2022-01-01

## 2022-01-01 RX ORDER — DEXAMETHASONE 0.5 MG/5ML
1 ELIXIR ORAL
Qty: 0 | Refills: 0 | DISCHARGE
Start: 2022-01-01

## 2022-01-01 RX ORDER — CITALOPRAM HYDROBROMIDE 10 MG/1
10 TABLET, FILM COATED ORAL
Refills: 0 | Status: ACTIVE | COMMUNITY

## 2022-01-01 RX ORDER — MAGNESIUM SULFATE 500 MG/ML
1 VIAL (ML) INJECTION ONCE
Refills: 0 | Status: COMPLETED | OUTPATIENT
Start: 2022-01-01 | End: 2022-01-01

## 2022-01-01 RX ORDER — NALOXONE HYDROCHLORIDE 4 MG/.1ML
0.1 SPRAY NASAL
Refills: 0 | Status: DISCONTINUED | OUTPATIENT
Start: 2022-01-01 | End: 2022-01-01

## 2022-01-01 RX ORDER — REMDESIVIR 5 MG/ML
100 INJECTION INTRAVENOUS EVERY 24 HOURS
Refills: 0 | Status: DISCONTINUED | OUTPATIENT
Start: 2022-01-01 | End: 2022-01-01

## 2022-01-01 RX ORDER — FUROSEMIDE 40 MG
20 TABLET ORAL ONCE
Refills: 0 | Status: COMPLETED | OUTPATIENT
Start: 2022-01-01 | End: 2022-01-01

## 2022-01-01 RX ORDER — IPRATROPIUM/ALBUTEROL SULFATE 18-103MCG
3 AEROSOL WITH ADAPTER (GRAM) INHALATION ONCE
Refills: 0 | Status: COMPLETED | OUTPATIENT
Start: 2022-01-01 | End: 2022-01-01

## 2022-01-01 RX ORDER — ACETAMINOPHEN 500 MG
2 TABLET ORAL
Qty: 0 | Refills: 0 | DISCHARGE
Start: 2022-01-01

## 2022-01-01 RX ORDER — METOCLOPRAMIDE HYDROCHLORIDE 10 MG/1
10 TABLET ORAL EVERY 8 HOURS
Qty: 90 | Refills: 3 | Status: COMPLETED | COMMUNITY
Start: 2021-05-27 | End: 2022-01-01

## 2022-01-01 RX ORDER — HYDROMORPHONE HYDROCHLORIDE 2 MG/ML
0.5 INJECTION INTRAMUSCULAR; INTRAVENOUS; SUBCUTANEOUS
Refills: 0 | Status: DISCONTINUED | OUTPATIENT
Start: 2022-01-01 | End: 2022-01-01

## 2022-01-01 RX ORDER — SENNA PLUS 8.6 MG/1
1 TABLET ORAL
Qty: 0 | Refills: 0 | DISCHARGE
Start: 2022-01-01

## 2022-01-01 RX ORDER — SODIUM CHLORIDE 9 MG/ML
1 INJECTION INTRAMUSCULAR; INTRAVENOUS; SUBCUTANEOUS THREE TIMES A DAY
Refills: 0 | Status: COMPLETED | OUTPATIENT
Start: 2022-01-01 | End: 2022-01-01

## 2022-01-01 RX ORDER — POTASSIUM CHLORIDE 20 MEQ
20 PACKET (EA) ORAL ONCE
Refills: 0 | Status: COMPLETED | OUTPATIENT
Start: 2022-01-01 | End: 2022-01-01

## 2022-01-01 RX ORDER — VALACYCLOVIR 500 MG/1
500 TABLET, FILM COATED ORAL
Qty: 30 | Refills: 2 | Status: COMPLETED | COMMUNITY
Start: 2021-05-27 | End: 2022-01-01

## 2022-01-01 RX ORDER — ALBUTEROL 90 UG/1
2 AEROSOL, METERED ORAL EVERY 6 HOURS
Refills: 0 | Status: DISCONTINUED | OUTPATIENT
Start: 2022-01-01 | End: 2022-01-01

## 2022-01-01 RX ORDER — POLYETHYLENE GLYCOL 3350 17 G/17G
17 POWDER, FOR SOLUTION ORAL DAILY
Refills: 0 | Status: DISCONTINUED | OUTPATIENT
Start: 2022-01-01 | End: 2022-01-01

## 2022-01-01 RX ORDER — DEXAMETHASONE 0.5 MG/5ML
6 ELIXIR ORAL DAILY
Refills: 0 | Status: DISCONTINUED | OUTPATIENT
Start: 2022-01-01 | End: 2022-01-01

## 2022-01-01 RX ORDER — CITALOPRAM 10 MG/1
10 TABLET, FILM COATED ORAL DAILY
Refills: 0 | Status: DISCONTINUED | OUTPATIENT
Start: 2022-01-01 | End: 2022-01-01

## 2022-01-01 RX ORDER — IBUPROFEN 200 MG
200 TABLET ORAL ONCE
Refills: 0 | Status: COMPLETED | OUTPATIENT
Start: 2022-01-01 | End: 2022-01-01

## 2022-01-01 RX ORDER — IPRATROPIUM BROMIDE 0.2 MG/ML
2 SOLUTION, NON-ORAL INHALATION
Qty: 0 | Refills: 0 | DISCHARGE
Start: 2022-01-01

## 2022-01-01 RX ORDER — METOPROLOL TARTRATE 50 MG
5 TABLET ORAL ONCE
Refills: 0 | Status: COMPLETED | OUTPATIENT
Start: 2022-01-01 | End: 2022-01-01

## 2022-01-01 RX ORDER — POLYETHYLENE GLYCOL 3350 17 G/17G
17 POWDER, FOR SOLUTION ORAL
Qty: 0 | Refills: 0 | DISCHARGE
Start: 2022-01-01

## 2022-01-01 RX ORDER — IPRATROPIUM BROMIDE 0.2 MG/ML
2 SOLUTION, NON-ORAL INHALATION EVERY 6 HOURS
Refills: 0 | Status: DISCONTINUED | OUTPATIENT
Start: 2022-01-01 | End: 2022-01-01

## 2022-01-01 RX ORDER — BUDESONIDE AND FORMOTEROL FUMARATE DIHYDRATE 160; 4.5 UG/1; UG/1
2 AEROSOL RESPIRATORY (INHALATION)
Refills: 0 | Status: DISCONTINUED | OUTPATIENT
Start: 2022-01-01 | End: 2022-01-01

## 2022-01-01 RX ORDER — ACETAMINOPHEN 500 MG
650 TABLET ORAL EVERY 6 HOURS
Refills: 0 | Status: DISCONTINUED | OUTPATIENT
Start: 2022-01-01 | End: 2022-01-01

## 2022-01-01 RX ORDER — AZITHROMYCIN 500 MG/1
500 TABLET, FILM COATED ORAL EVERY 24 HOURS
Refills: 0 | Status: DISCONTINUED | OUTPATIENT
Start: 2022-01-01 | End: 2022-01-01

## 2022-01-01 RX ORDER — ONDANSETRON 8 MG/1
4 TABLET, FILM COATED ORAL EVERY 8 HOURS
Refills: 0 | Status: DISCONTINUED | OUTPATIENT
Start: 2022-01-01 | End: 2022-01-01

## 2022-01-01 RX ORDER — POTASSIUM CHLORIDE 20 MEQ
10 PACKET (EA) ORAL
Refills: 0 | Status: COMPLETED | OUTPATIENT
Start: 2022-01-01 | End: 2022-01-01

## 2022-01-01 RX ORDER — HEPARIN SODIUM 5000 [USP'U]/ML
5000 INJECTION INTRAVENOUS; SUBCUTANEOUS EVERY 12 HOURS
Refills: 0 | Status: DISCONTINUED | OUTPATIENT
Start: 2022-01-01 | End: 2022-01-01

## 2022-01-01 RX ORDER — GUAIFENESIN 400 MG/1
400 TABLET ORAL EVERY 4 HOURS
Qty: 60 | Refills: 0 | Status: ACTIVE | COMMUNITY
Start: 2022-01-01 | End: 1900-01-01

## 2022-01-01 RX ORDER — CEFTRIAXONE 500 MG/1
1000 INJECTION, POWDER, FOR SOLUTION INTRAMUSCULAR; INTRAVENOUS ONCE
Refills: 0 | Status: COMPLETED | OUTPATIENT
Start: 2022-01-01 | End: 2022-01-01

## 2022-01-01 RX ORDER — LANOLIN ALCOHOL/MO/W.PET/CERES
3 CREAM (GRAM) TOPICAL AT BEDTIME
Refills: 0 | Status: DISCONTINUED | OUTPATIENT
Start: 2022-01-01 | End: 2022-01-01

## 2022-01-01 RX ORDER — REMDESIVIR 5 MG/ML
100 INJECTION INTRAVENOUS
Refills: 0 | Status: COMPLETED | OUTPATIENT
Start: 2022-01-01 | End: 2022-01-01

## 2022-01-01 RX ORDER — LEVALBUTEROL 1.25 MG/.5ML
0.63 SOLUTION, CONCENTRATE RESPIRATORY (INHALATION) EVERY 6 HOURS
Refills: 0 | Status: DISCONTINUED | OUTPATIENT
Start: 2022-01-01 | End: 2022-01-01

## 2022-01-01 RX ORDER — METOPROLOL TARTRATE 50 MG
50 TABLET ORAL
Refills: 0 | Status: DISCONTINUED | OUTPATIENT
Start: 2022-01-01 | End: 2022-01-01

## 2022-01-01 RX ORDER — INFLUENZA VIRUS VACCINE 15; 15; 15; 15 UG/.5ML; UG/.5ML; UG/.5ML; UG/.5ML
0.7 SUSPENSION INTRAMUSCULAR ONCE
Refills: 0 | Status: DISCONTINUED | OUTPATIENT
Start: 2022-01-01 | End: 2022-01-01

## 2022-01-01 RX ORDER — REMDESIVIR 5 MG/ML
200 INJECTION INTRAVENOUS EVERY 24 HOURS
Refills: 0 | Status: COMPLETED | OUTPATIENT
Start: 2022-01-01 | End: 2022-01-01

## 2022-01-01 RX ORDER — ACETAMINOPHEN 500 MG
750 TABLET ORAL ONCE
Refills: 0 | Status: DISCONTINUED | OUTPATIENT
Start: 2022-01-01 | End: 2022-01-01

## 2022-01-01 RX ORDER — DOCUSATE SODIUM 100 MG/1
100 CAPSULE ORAL 3 TIMES DAILY
Qty: 90 | Refills: 0 | Status: COMPLETED | COMMUNITY
Start: 2021-05-27 | End: 2022-01-01

## 2022-01-01 RX ORDER — ACETAMINOPHEN 500 MG
1000 TABLET ORAL ONCE
Refills: 0 | Status: DISCONTINUED | OUTPATIENT
Start: 2022-01-01 | End: 2022-01-01

## 2022-01-01 RX ORDER — ALBUTEROL 90 UG/1
2.5 AEROSOL, METERED ORAL ONCE
Refills: 0 | Status: DISCONTINUED | OUTPATIENT
Start: 2022-01-01 | End: 2022-01-01

## 2022-01-01 RX ORDER — SENNA PLUS 8.6 MG/1
1 TABLET ORAL DAILY
Refills: 0 | Status: DISCONTINUED | OUTPATIENT
Start: 2022-01-01 | End: 2022-01-01

## 2022-01-01 RX ORDER — BUDESONIDE AND FORMOTEROL FUMARATE DIHYDRATE 160; 4.5 UG/1; UG/1
2 AEROSOL RESPIRATORY (INHALATION)
Qty: 0 | Refills: 0 | DISCHARGE
Start: 2022-01-01

## 2022-01-01 RX ORDER — ONDANSETRON 8 MG/1
4 TABLET, FILM COATED ORAL EVERY 6 HOURS
Refills: 0 | Status: DISCONTINUED | OUTPATIENT
Start: 2022-01-01 | End: 2022-01-01

## 2022-01-01 RX ORDER — BENZONATATE 100 MG/1
100 CAPSULE ORAL
Qty: 90 | Refills: 0 | Status: ACTIVE | COMMUNITY
Start: 2022-01-01 | End: 1900-01-01

## 2022-01-01 RX ORDER — CEFUROXIME AXETIL 250 MG
1 TABLET ORAL
Qty: 6 | Refills: 0
Start: 2022-01-01 | End: 2022-01-01

## 2022-01-01 RX ORDER — LEVOFLOXACIN 500 MG/1
500 TABLET, FILM COATED ORAL DAILY
Qty: 14 | Refills: 0 | Status: COMPLETED | COMMUNITY
Start: 2021-07-29 | End: 2022-01-01

## 2022-01-01 RX ORDER — AZITHROMYCIN 500 MG/1
500 TABLET, FILM COATED ORAL ONCE
Refills: 0 | Status: COMPLETED | OUTPATIENT
Start: 2022-01-01 | End: 2022-01-01

## 2022-01-01 RX ORDER — CEFTRIAXONE 500 MG/1
INJECTION, POWDER, FOR SOLUTION INTRAMUSCULAR; INTRAVENOUS
Refills: 0 | Status: DISCONTINUED | OUTPATIENT
Start: 2022-01-01 | End: 2022-01-01

## 2022-01-01 RX ORDER — HYDROMORPHONE HYDROCHLORIDE 2 MG/ML
30 INJECTION INTRAMUSCULAR; INTRAVENOUS; SUBCUTANEOUS
Refills: 0 | Status: DISCONTINUED | OUTPATIENT
Start: 2022-01-01 | End: 2022-01-01

## 2022-01-01 RX ADMIN — CITALOPRAM 10 MILLIGRAM(S): 10 TABLET, FILM COATED ORAL at 11:49

## 2022-01-01 RX ADMIN — Medication 650 MILLIGRAM(S): at 21:55

## 2022-01-01 RX ADMIN — ALBUTEROL 2 PUFF(S): 90 AEROSOL, METERED ORAL at 05:48

## 2022-01-01 RX ADMIN — LIDOCAINE 1 PATCH: 4 CREAM TOPICAL at 11:32

## 2022-01-01 RX ADMIN — Medication 25 GRAM(S): at 09:19

## 2022-01-01 RX ADMIN — LIDOCAINE 1 PATCH: 4 CREAM TOPICAL at 07:20

## 2022-01-01 RX ADMIN — LEVALBUTEROL 2 PUFF(S): 1.25 SOLUTION, CONCENTRATE RESPIRATORY (INHALATION) at 17:04

## 2022-01-01 RX ADMIN — Medication 6 MILLIGRAM(S): at 05:30

## 2022-01-01 RX ADMIN — CITALOPRAM 10 MILLIGRAM(S): 10 TABLET, FILM COATED ORAL at 17:19

## 2022-01-01 RX ADMIN — Medication 3 MILLIGRAM(S): at 22:05

## 2022-01-01 RX ADMIN — HEPARIN SODIUM 5000 UNIT(S): 5000 INJECTION INTRAVENOUS; SUBCUTANEOUS at 17:11

## 2022-01-01 RX ADMIN — Medication 3 MILLIGRAM(S): at 21:31

## 2022-01-01 RX ADMIN — Medication 1 SPRAY(S): at 06:50

## 2022-01-01 RX ADMIN — Medication 6 MILLIGRAM(S): at 05:50

## 2022-01-01 RX ADMIN — HEPARIN SODIUM 5000 UNIT(S): 5000 INJECTION INTRAVENOUS; SUBCUTANEOUS at 05:10

## 2022-01-01 RX ADMIN — Medication 12.5 MILLIGRAM(S): at 12:34

## 2022-01-01 RX ADMIN — HYDROMORPHONE HYDROCHLORIDE 1 MILLIGRAM(S): 2 INJECTION INTRAMUSCULAR; INTRAVENOUS; SUBCUTANEOUS at 17:04

## 2022-01-01 RX ADMIN — LIDOCAINE 1 PATCH: 4 CREAM TOPICAL at 13:19

## 2022-01-01 RX ADMIN — PANTOPRAZOLE SODIUM 40 MILLIGRAM(S): 20 TABLET, DELAYED RELEASE ORAL at 05:11

## 2022-01-01 RX ADMIN — Medication 2 PUFF(S): at 17:23

## 2022-01-01 RX ADMIN — HEPARIN SODIUM 5000 UNIT(S): 5000 INJECTION INTRAVENOUS; SUBCUTANEOUS at 18:44

## 2022-01-01 RX ADMIN — SODIUM CHLORIDE 1 GRAM(S): 9 INJECTION INTRAMUSCULAR; INTRAVENOUS; SUBCUTANEOUS at 14:18

## 2022-01-01 RX ADMIN — SENNA PLUS 1 TABLET(S): 8.6 TABLET ORAL at 13:58

## 2022-01-01 RX ADMIN — Medication 200 MILLIGRAM(S): at 22:24

## 2022-01-01 RX ADMIN — ALBUTEROL 2 PUFF(S): 90 AEROSOL, METERED ORAL at 05:43

## 2022-01-01 RX ADMIN — ALBUTEROL 2 PUFF(S): 90 AEROSOL, METERED ORAL at 21:01

## 2022-01-01 RX ADMIN — SODIUM CHLORIDE 1 GRAM(S): 9 INJECTION INTRAMUSCULAR; INTRAVENOUS; SUBCUTANEOUS at 05:11

## 2022-01-01 RX ADMIN — PANTOPRAZOLE SODIUM 40 MILLIGRAM(S): 20 TABLET, DELAYED RELEASE ORAL at 05:16

## 2022-01-01 RX ADMIN — HEPARIN SODIUM 5000 UNIT(S): 5000 INJECTION INTRAVENOUS; SUBCUTANEOUS at 17:53

## 2022-01-01 RX ADMIN — HEPARIN SODIUM 5000 UNIT(S): 5000 INJECTION INTRAVENOUS; SUBCUTANEOUS at 18:51

## 2022-01-01 RX ADMIN — PANTOPRAZOLE SODIUM 40 MILLIGRAM(S): 20 TABLET, DELAYED RELEASE ORAL at 07:59

## 2022-01-01 RX ADMIN — Medication 650 MILLIGRAM(S): at 15:01

## 2022-01-01 RX ADMIN — POLYETHYLENE GLYCOL 3350 17 GRAM(S): 17 POWDER, FOR SOLUTION ORAL at 11:43

## 2022-01-01 RX ADMIN — Medication 1 MILLIGRAM(S): at 11:12

## 2022-01-01 RX ADMIN — LEVALBUTEROL 2 PUFF(S): 1.25 SOLUTION, CONCENTRATE RESPIRATORY (INHALATION) at 14:35

## 2022-01-01 RX ADMIN — SODIUM CHLORIDE 75 MILLILITER(S): 9 INJECTION, SOLUTION INTRAVENOUS at 16:08

## 2022-01-01 RX ADMIN — LIDOCAINE 1 PATCH: 4 CREAM TOPICAL at 00:37

## 2022-01-01 RX ADMIN — Medication 1 MILLIGRAM(S): at 13:59

## 2022-01-01 RX ADMIN — LIDOCAINE 1 PATCH: 4 CREAM TOPICAL at 19:30

## 2022-01-01 RX ADMIN — PANTOPRAZOLE SODIUM 40 MILLIGRAM(S): 20 TABLET, DELAYED RELEASE ORAL at 05:06

## 2022-01-01 RX ADMIN — Medication 1 SPRAY(S): at 05:51

## 2022-01-01 RX ADMIN — HEPARIN SODIUM 5000 UNIT(S): 5000 INJECTION INTRAVENOUS; SUBCUTANEOUS at 17:02

## 2022-01-01 RX ADMIN — Medication 50 MILLIGRAM(S): at 17:24

## 2022-01-01 RX ADMIN — Medication 50 MILLIGRAM(S): at 18:13

## 2022-01-01 RX ADMIN — Medication 25 MILLIGRAM(S): at 05:14

## 2022-01-01 RX ADMIN — Medication 650 MILLIGRAM(S): at 20:20

## 2022-01-01 RX ADMIN — ENOXAPARIN SODIUM 40 MILLIGRAM(S): 100 INJECTION SUBCUTANEOUS at 12:06

## 2022-01-01 RX ADMIN — Medication 1 MILLIGRAM(S): at 12:04

## 2022-01-01 RX ADMIN — Medication 1 PACKET(S): at 09:17

## 2022-01-01 RX ADMIN — Medication 650 MILLIGRAM(S): at 09:52

## 2022-01-01 RX ADMIN — REMDESIVIR 500 MILLIGRAM(S): 5 INJECTION INTRAVENOUS at 14:34

## 2022-01-01 RX ADMIN — Medication 6 MILLIGRAM(S): at 06:43

## 2022-01-01 RX ADMIN — SENNA PLUS 1 TABLET(S): 8.6 TABLET ORAL at 11:36

## 2022-01-01 RX ADMIN — Medication 2 PUFF(S): at 05:31

## 2022-01-01 RX ADMIN — AZITHROMYCIN 255 MILLIGRAM(S): 500 TABLET, FILM COATED ORAL at 17:14

## 2022-01-01 RX ADMIN — POTASSIUM PHOSPHATE, MONOBASIC POTASSIUM PHOSPHATE, DIBASIC 62.5 MILLIMOLE(S): 236; 224 INJECTION, SOLUTION INTRAVENOUS at 12:50

## 2022-01-01 RX ADMIN — Medication 50 MILLIGRAM(S): at 18:10

## 2022-01-01 RX ADMIN — HEPARIN SODIUM 5000 UNIT(S): 5000 INJECTION INTRAVENOUS; SUBCUTANEOUS at 06:16

## 2022-01-01 RX ADMIN — Medication 3 MILLIGRAM(S): at 22:14

## 2022-01-01 RX ADMIN — Medication 1 PACKET(S): at 18:54

## 2022-01-01 RX ADMIN — Medication 3 MILLIGRAM(S): at 22:46

## 2022-01-01 RX ADMIN — Medication 100 MILLIEQUIVALENT(S): at 23:18

## 2022-01-01 RX ADMIN — PANTOPRAZOLE SODIUM 40 MILLIGRAM(S): 20 TABLET, DELAYED RELEASE ORAL at 06:06

## 2022-01-01 RX ADMIN — SODIUM CHLORIDE 1 GRAM(S): 9 INJECTION INTRAMUSCULAR; INTRAVENOUS; SUBCUTANEOUS at 11:31

## 2022-01-01 RX ADMIN — BUDESONIDE AND FORMOTEROL FUMARATE DIHYDRATE 2 PUFF(S): 160; 4.5 AEROSOL RESPIRATORY (INHALATION) at 11:39

## 2022-01-01 RX ADMIN — Medication 1 SPRAY(S): at 06:28

## 2022-01-01 RX ADMIN — CITALOPRAM 10 MILLIGRAM(S): 10 TABLET, FILM COATED ORAL at 11:37

## 2022-01-01 RX ADMIN — CITALOPRAM 10 MILLIGRAM(S): 10 TABLET, FILM COATED ORAL at 13:06

## 2022-01-01 RX ADMIN — Medication 25 MILLIGRAM(S): at 18:44

## 2022-01-01 RX ADMIN — Medication 100 MILLIEQUIVALENT(S): at 04:42

## 2022-01-01 RX ADMIN — ENOXAPARIN SODIUM 40 MILLIGRAM(S): 100 INJECTION SUBCUTANEOUS at 17:41

## 2022-01-01 RX ADMIN — Medication 600 MILLIGRAM(S): at 05:33

## 2022-01-01 RX ADMIN — SENNA PLUS 1 TABLET(S): 8.6 TABLET ORAL at 13:18

## 2022-01-01 RX ADMIN — LIDOCAINE 1 PATCH: 4 CREAM TOPICAL at 13:07

## 2022-01-01 RX ADMIN — Medication 1 SPRAY(S): at 17:24

## 2022-01-01 RX ADMIN — POLYETHYLENE GLYCOL 3350 17 GRAM(S): 17 POWDER, FOR SOLUTION ORAL at 11:32

## 2022-01-01 RX ADMIN — HEPARIN SODIUM 5000 UNIT(S): 5000 INJECTION INTRAVENOUS; SUBCUTANEOUS at 07:01

## 2022-01-01 RX ADMIN — Medication 1 SPRAY(S): at 06:06

## 2022-01-01 RX ADMIN — Medication 1 SPRAY(S): at 06:41

## 2022-01-01 RX ADMIN — Medication 600 MILLIGRAM(S): at 11:06

## 2022-01-01 RX ADMIN — LIDOCAINE 1 PATCH: 4 CREAM TOPICAL at 12:55

## 2022-01-01 RX ADMIN — Medication 1 MILLIGRAM(S): at 18:44

## 2022-01-01 RX ADMIN — CITALOPRAM 10 MILLIGRAM(S): 10 TABLET, FILM COATED ORAL at 12:09

## 2022-01-01 RX ADMIN — Medication 1 SPRAY(S): at 06:00

## 2022-01-01 RX ADMIN — Medication 1 SPRAY(S): at 05:43

## 2022-01-01 RX ADMIN — CITALOPRAM 10 MILLIGRAM(S): 10 TABLET, FILM COATED ORAL at 15:45

## 2022-01-01 RX ADMIN — REMDESIVIR 500 MILLIGRAM(S): 5 INJECTION INTRAVENOUS at 14:41

## 2022-01-01 RX ADMIN — Medication 1 MILLIGRAM(S): at 16:07

## 2022-01-01 RX ADMIN — SODIUM CHLORIDE 1 GRAM(S): 9 INJECTION INTRAMUSCULAR; INTRAVENOUS; SUBCUTANEOUS at 11:12

## 2022-01-01 RX ADMIN — Medication 3 MILLIGRAM(S): at 22:23

## 2022-01-01 RX ADMIN — LIDOCAINE 1 PATCH: 4 CREAM TOPICAL at 00:00

## 2022-01-01 RX ADMIN — Medication 1 SPRAY(S): at 18:12

## 2022-01-01 RX ADMIN — ALBUTEROL 2 PUFF(S): 90 AEROSOL, METERED ORAL at 09:14

## 2022-01-01 RX ADMIN — Medication 3 MILLIGRAM(S): at 21:43

## 2022-01-01 RX ADMIN — Medication 50 MILLIGRAM(S): at 06:08

## 2022-01-01 RX ADMIN — CITALOPRAM 10 MILLIGRAM(S): 10 TABLET, FILM COATED ORAL at 12:30

## 2022-01-01 RX ADMIN — LEVALBUTEROL 2 PUFF(S): 1.25 SOLUTION, CONCENTRATE RESPIRATORY (INHALATION) at 05:49

## 2022-01-01 RX ADMIN — HEPARIN SODIUM 5000 UNIT(S): 5000 INJECTION INTRAVENOUS; SUBCUTANEOUS at 05:11

## 2022-01-01 RX ADMIN — Medication 2 PUFF(S): at 22:05

## 2022-01-01 RX ADMIN — ALBUTEROL 2 PUFF(S): 90 AEROSOL, METERED ORAL at 05:57

## 2022-01-01 RX ADMIN — Medication 1 SPRAY(S): at 05:06

## 2022-01-01 RX ADMIN — HEPARIN SODIUM 5000 UNIT(S): 5000 INJECTION INTRAVENOUS; SUBCUTANEOUS at 06:27

## 2022-01-01 RX ADMIN — POTASSIUM PHOSPHATE, MONOBASIC POTASSIUM PHOSPHATE, DIBASIC 62.5 MILLIMOLE(S): 236; 224 INJECTION, SOLUTION INTRAVENOUS at 10:46

## 2022-01-01 RX ADMIN — LEVALBUTEROL 2 PUFF(S): 1.25 SOLUTION, CONCENTRATE RESPIRATORY (INHALATION) at 21:32

## 2022-01-01 RX ADMIN — CEFTRIAXONE 100 MILLIGRAM(S): 500 INJECTION, POWDER, FOR SOLUTION INTRAMUSCULAR; INTRAVENOUS at 14:13

## 2022-01-01 RX ADMIN — Medication 1 SPRAY(S): at 17:29

## 2022-01-01 RX ADMIN — Medication 6 MILLIGRAM(S): at 17:34

## 2022-01-01 RX ADMIN — Medication 650 MILLIGRAM(S): at 09:49

## 2022-01-01 RX ADMIN — ENOXAPARIN SODIUM 40 MILLIGRAM(S): 100 INJECTION SUBCUTANEOUS at 11:58

## 2022-01-01 RX ADMIN — HEPARIN SODIUM 5000 UNIT(S): 5000 INJECTION INTRAVENOUS; SUBCUTANEOUS at 17:30

## 2022-01-01 RX ADMIN — LEVALBUTEROL 2 PUFF(S): 1.25 SOLUTION, CONCENTRATE RESPIRATORY (INHALATION) at 05:42

## 2022-01-01 RX ADMIN — Medication 1 SPRAY(S): at 05:41

## 2022-01-01 RX ADMIN — LIDOCAINE 1 PATCH: 4 CREAM TOPICAL at 00:24

## 2022-01-01 RX ADMIN — Medication 12.5 MILLIGRAM(S): at 01:14

## 2022-01-01 RX ADMIN — CITALOPRAM 10 MILLIGRAM(S): 10 TABLET, FILM COATED ORAL at 12:04

## 2022-01-01 RX ADMIN — CEFEPIME 100 MILLIGRAM(S): 1 INJECTION, POWDER, FOR SOLUTION INTRAMUSCULAR; INTRAVENOUS at 05:01

## 2022-01-01 RX ADMIN — ALBUTEROL 2 PUFF(S): 90 AEROSOL, METERED ORAL at 16:19

## 2022-01-01 RX ADMIN — ALBUTEROL 2 PUFF(S): 90 AEROSOL, METERED ORAL at 05:33

## 2022-01-01 RX ADMIN — ALBUTEROL 2 PUFF(S): 90 AEROSOL, METERED ORAL at 22:05

## 2022-01-01 RX ADMIN — LEVALBUTEROL 2 PUFF(S): 1.25 SOLUTION, CONCENTRATE RESPIRATORY (INHALATION) at 21:01

## 2022-01-01 RX ADMIN — Medication 1 MILLIGRAM(S): at 12:27

## 2022-01-01 RX ADMIN — Medication 650 MILLIGRAM(S): at 10:19

## 2022-01-01 RX ADMIN — LEVALBUTEROL 2 PUFF(S): 1.25 SOLUTION, CONCENTRATE RESPIRATORY (INHALATION) at 23:07

## 2022-01-01 RX ADMIN — Medication 5 MILLIGRAM(S): at 09:06

## 2022-01-01 RX ADMIN — HEPARIN SODIUM 5000 UNIT(S): 5000 INJECTION INTRAVENOUS; SUBCUTANEOUS at 06:02

## 2022-01-01 RX ADMIN — Medication 2 PUFF(S): at 16:56

## 2022-01-01 RX ADMIN — LIDOCAINE 1 PATCH: 4 CREAM TOPICAL at 11:40

## 2022-01-01 RX ADMIN — Medication 600 MILLIGRAM(S): at 18:13

## 2022-01-01 RX ADMIN — CITALOPRAM 10 MILLIGRAM(S): 10 TABLET, FILM COATED ORAL at 12:21

## 2022-01-01 RX ADMIN — HEPARIN SODIUM 5000 UNIT(S): 5000 INJECTION INTRAVENOUS; SUBCUTANEOUS at 05:57

## 2022-01-01 RX ADMIN — LEVALBUTEROL 2 PUFF(S): 1.25 SOLUTION, CONCENTRATE RESPIRATORY (INHALATION) at 16:57

## 2022-01-01 RX ADMIN — HEPARIN SODIUM 5000 UNIT(S): 5000 INJECTION INTRAVENOUS; SUBCUTANEOUS at 06:00

## 2022-01-01 RX ADMIN — LEVALBUTEROL 2 PUFF(S): 1.25 SOLUTION, CONCENTRATE RESPIRATORY (INHALATION) at 16:19

## 2022-01-01 RX ADMIN — SODIUM CHLORIDE 1 GRAM(S): 9 INJECTION INTRAMUSCULAR; INTRAVENOUS; SUBCUTANEOUS at 14:42

## 2022-01-01 RX ADMIN — Medication 1 MILLIGRAM(S): at 12:41

## 2022-01-01 RX ADMIN — LEVALBUTEROL 2 PUFF(S): 1.25 SOLUTION, CONCENTRATE RESPIRATORY (INHALATION) at 09:53

## 2022-01-01 RX ADMIN — Medication 1 MILLIGRAM(S): at 12:54

## 2022-01-01 RX ADMIN — Medication 3 MILLIGRAM(S): at 19:49

## 2022-01-01 RX ADMIN — CEFEPIME 100 MILLIGRAM(S): 1 INJECTION, POWDER, FOR SOLUTION INTRAMUSCULAR; INTRAVENOUS at 05:55

## 2022-01-01 RX ADMIN — BUDESONIDE AND FORMOTEROL FUMARATE DIHYDRATE 2 PUFF(S): 160; 4.5 AEROSOL RESPIRATORY (INHALATION) at 21:47

## 2022-01-01 RX ADMIN — PANTOPRAZOLE SODIUM 40 MILLIGRAM(S): 20 TABLET, DELAYED RELEASE ORAL at 06:48

## 2022-01-01 RX ADMIN — Medication 25 MILLIGRAM(S): at 05:02

## 2022-01-01 RX ADMIN — HYDROMORPHONE HYDROCHLORIDE 1 MILLIGRAM(S): 2 INJECTION INTRAMUSCULAR; INTRAVENOUS; SUBCUTANEOUS at 11:42

## 2022-01-01 RX ADMIN — PANTOPRAZOLE SODIUM 40 MILLIGRAM(S): 20 TABLET, DELAYED RELEASE ORAL at 05:14

## 2022-01-01 RX ADMIN — Medication 650 MILLIGRAM(S): at 21:02

## 2022-01-01 RX ADMIN — Medication 650 MILLIGRAM(S): at 01:43

## 2022-01-01 RX ADMIN — HEPARIN SODIUM 5000 UNIT(S): 5000 INJECTION INTRAVENOUS; SUBCUTANEOUS at 06:52

## 2022-01-01 RX ADMIN — ALBUTEROL 2 PUFF(S): 90 AEROSOL, METERED ORAL at 16:49

## 2022-01-01 RX ADMIN — LEVALBUTEROL 2 PUFF(S): 1.25 SOLUTION, CONCENTRATE RESPIRATORY (INHALATION) at 04:23

## 2022-01-01 RX ADMIN — Medication 600 MILLIGRAM(S): at 17:24

## 2022-01-01 RX ADMIN — Medication 200 MILLIGRAM(S): at 16:36

## 2022-01-01 RX ADMIN — Medication 1 MILLIGRAM(S): at 11:57

## 2022-01-01 RX ADMIN — Medication 600 MILLIGRAM(S): at 18:20

## 2022-01-01 RX ADMIN — Medication 1 SPRAY(S): at 06:02

## 2022-01-01 RX ADMIN — Medication 1 MILLIGRAM(S): at 12:09

## 2022-01-01 RX ADMIN — ONDANSETRON 4 MILLIGRAM(S): 8 TABLET, FILM COATED ORAL at 21:24

## 2022-01-01 RX ADMIN — LEVALBUTEROL 2 PUFF(S): 1.25 SOLUTION, CONCENTRATE RESPIRATORY (INHALATION) at 09:21

## 2022-01-01 RX ADMIN — Medication 20 MILLIEQUIVALENT(S): at 09:26

## 2022-01-01 RX ADMIN — BUDESONIDE AND FORMOTEROL FUMARATE DIHYDRATE 2 PUFF(S): 160; 4.5 AEROSOL RESPIRATORY (INHALATION) at 00:00

## 2022-01-01 RX ADMIN — Medication 50 MILLIGRAM(S): at 17:08

## 2022-01-01 RX ADMIN — Medication 600 MILLIGRAM(S): at 17:40

## 2022-01-01 RX ADMIN — Medication 750 MILLIGRAM(S): at 04:45

## 2022-01-01 RX ADMIN — Medication 12.5 MILLIGRAM(S): at 13:17

## 2022-01-01 RX ADMIN — SENNA PLUS 1 TABLET(S): 8.6 TABLET ORAL at 12:55

## 2022-01-01 RX ADMIN — Medication 50 MILLIGRAM(S): at 05:31

## 2022-01-01 RX ADMIN — SENNA PLUS 1 TABLET(S): 8.6 TABLET ORAL at 11:33

## 2022-01-01 RX ADMIN — ENOXAPARIN SODIUM 40 MILLIGRAM(S): 100 INJECTION SUBCUTANEOUS at 17:07

## 2022-01-01 RX ADMIN — POTASSIUM PHOSPHATE, MONOBASIC POTASSIUM PHOSPHATE, DIBASIC 62.5 MILLIMOLE(S): 236; 224 INJECTION, SOLUTION INTRAVENOUS at 12:53

## 2022-01-01 RX ADMIN — SENNA PLUS 1 TABLET(S): 8.6 TABLET ORAL at 12:20

## 2022-01-01 RX ADMIN — LEVALBUTEROL 2 PUFF(S): 1.25 SOLUTION, CONCENTRATE RESPIRATORY (INHALATION) at 09:41

## 2022-01-01 RX ADMIN — Medication 650 MILLIGRAM(S): at 05:51

## 2022-01-01 RX ADMIN — Medication 650 MILLIGRAM(S): at 21:26

## 2022-01-01 RX ADMIN — CEFEPIME 100 MILLIGRAM(S): 1 INJECTION, POWDER, FOR SOLUTION INTRAMUSCULAR; INTRAVENOUS at 21:43

## 2022-01-01 RX ADMIN — POLYETHYLENE GLYCOL 3350 17 GRAM(S): 17 POWDER, FOR SOLUTION ORAL at 11:56

## 2022-01-01 RX ADMIN — Medication 1 MILLIGRAM(S): at 13:19

## 2022-01-01 RX ADMIN — Medication 50 MILLIGRAM(S): at 05:43

## 2022-01-01 RX ADMIN — Medication 6 MILLIGRAM(S): at 12:42

## 2022-01-01 RX ADMIN — Medication 600 MILLIGRAM(S): at 06:09

## 2022-01-01 RX ADMIN — Medication 1 MILLIGRAM(S): at 11:55

## 2022-01-01 RX ADMIN — Medication 650 MILLIGRAM(S): at 21:38

## 2022-01-01 RX ADMIN — LIDOCAINE 1 PATCH: 4 CREAM TOPICAL at 12:10

## 2022-01-01 RX ADMIN — Medication 650 MILLIGRAM(S): at 13:07

## 2022-01-01 RX ADMIN — REMDESIVIR 500 MILLIGRAM(S): 5 INJECTION INTRAVENOUS at 13:58

## 2022-01-01 RX ADMIN — Medication 2 PUFF(S): at 21:01

## 2022-01-01 RX ADMIN — LEVALBUTEROL 2 PUFF(S): 1.25 SOLUTION, CONCENTRATE RESPIRATORY (INHALATION) at 04:36

## 2022-01-01 RX ADMIN — Medication 50 MILLIGRAM(S): at 06:42

## 2022-01-01 RX ADMIN — Medication 1 MILLIGRAM(S): at 12:19

## 2022-01-01 RX ADMIN — Medication 1 SPRAY(S): at 17:55

## 2022-01-01 RX ADMIN — ALBUTEROL 2 PUFF(S): 90 AEROSOL, METERED ORAL at 16:28

## 2022-01-01 RX ADMIN — LIDOCAINE 1 PATCH: 4 CREAM TOPICAL at 12:20

## 2022-01-01 RX ADMIN — Medication 1 SPRAY(S): at 17:15

## 2022-01-01 RX ADMIN — Medication 650 MILLIGRAM(S): at 05:06

## 2022-01-01 RX ADMIN — Medication 400 MILLIGRAM(S): at 22:13

## 2022-01-01 RX ADMIN — HEPARIN SODIUM 5000 UNIT(S): 5000 INJECTION INTRAVENOUS; SUBCUTANEOUS at 17:16

## 2022-01-01 RX ADMIN — Medication 600 MILLIGRAM(S): at 05:50

## 2022-01-01 RX ADMIN — CEFTRIAXONE 100 MILLIGRAM(S): 500 INJECTION, POWDER, FOR SOLUTION INTRAMUSCULAR; INTRAVENOUS at 14:38

## 2022-01-01 RX ADMIN — SENNA PLUS 1 TABLET(S): 8.6 TABLET ORAL at 13:09

## 2022-01-01 RX ADMIN — LIDOCAINE 1 PATCH: 4 CREAM TOPICAL at 19:22

## 2022-01-01 RX ADMIN — Medication 650 MILLIGRAM(S): at 05:33

## 2022-01-01 RX ADMIN — Medication 650 MILLIGRAM(S): at 16:30

## 2022-01-01 RX ADMIN — Medication 50 MILLIGRAM(S): at 05:58

## 2022-01-01 RX ADMIN — Medication 1000 MILLIGRAM(S): at 21:30

## 2022-01-01 RX ADMIN — PANTOPRAZOLE SODIUM 40 MILLIGRAM(S): 20 TABLET, DELAYED RELEASE ORAL at 06:16

## 2022-01-01 RX ADMIN — Medication 1 MILLIGRAM(S): at 13:07

## 2022-01-01 RX ADMIN — Medication 1 SPRAY(S): at 06:36

## 2022-01-01 RX ADMIN — PANTOPRAZOLE SODIUM 40 MILLIGRAM(S): 20 TABLET, DELAYED RELEASE ORAL at 06:00

## 2022-01-01 RX ADMIN — CEFEPIME 100 MILLIGRAM(S): 1 INJECTION, POWDER, FOR SOLUTION INTRAMUSCULAR; INTRAVENOUS at 05:13

## 2022-01-01 RX ADMIN — CITALOPRAM 10 MILLIGRAM(S): 10 TABLET, FILM COATED ORAL at 13:03

## 2022-01-01 RX ADMIN — LIDOCAINE 1 PATCH: 4 CREAM TOPICAL at 19:00

## 2022-01-01 RX ADMIN — CEFEPIME 100 MILLIGRAM(S): 1 INJECTION, POWDER, FOR SOLUTION INTRAMUSCULAR; INTRAVENOUS at 21:34

## 2022-01-01 RX ADMIN — Medication 650 MILLIGRAM(S): at 05:50

## 2022-01-01 RX ADMIN — CITALOPRAM 10 MILLIGRAM(S): 10 TABLET, FILM COATED ORAL at 12:39

## 2022-01-01 RX ADMIN — PANTOPRAZOLE SODIUM 40 MILLIGRAM(S): 20 TABLET, DELAYED RELEASE ORAL at 05:57

## 2022-01-01 RX ADMIN — Medication 1 SPRAY(S): at 17:05

## 2022-01-01 RX ADMIN — LIDOCAINE 1 PATCH: 4 CREAM TOPICAL at 20:30

## 2022-01-01 RX ADMIN — Medication 650 MILLIGRAM(S): at 13:50

## 2022-01-01 RX ADMIN — SODIUM CHLORIDE 30 MILLILITER(S): 9 INJECTION, SOLUTION INTRAVENOUS at 20:12

## 2022-01-01 RX ADMIN — Medication 650 MILLIGRAM(S): at 06:09

## 2022-01-01 RX ADMIN — ALBUTEROL 2 PUFF(S): 90 AEROSOL, METERED ORAL at 10:26

## 2022-01-01 RX ADMIN — Medication 1 SPRAY(S): at 18:21

## 2022-01-01 RX ADMIN — HEPARIN SODIUM 5000 UNIT(S): 5000 INJECTION INTRAVENOUS; SUBCUTANEOUS at 05:15

## 2022-01-01 RX ADMIN — Medication 50 MILLIGRAM(S): at 17:30

## 2022-01-01 RX ADMIN — HEPARIN SODIUM 5000 UNIT(S): 5000 INJECTION INTRAVENOUS; SUBCUTANEOUS at 17:58

## 2022-01-01 RX ADMIN — Medication 25 MILLIGRAM(S): at 02:50

## 2022-01-01 RX ADMIN — Medication 650 MILLIGRAM(S): at 06:15

## 2022-01-01 RX ADMIN — HEPARIN SODIUM 5000 UNIT(S): 5000 INJECTION INTRAVENOUS; SUBCUTANEOUS at 18:35

## 2022-01-01 RX ADMIN — Medication 650 MILLIGRAM(S): at 13:28

## 2022-01-01 RX ADMIN — POLYETHYLENE GLYCOL 3350 17 GRAM(S): 17 POWDER, FOR SOLUTION ORAL at 12:55

## 2022-01-01 RX ADMIN — Medication 1 SPRAY(S): at 17:02

## 2022-01-01 RX ADMIN — CITALOPRAM 10 MILLIGRAM(S): 10 TABLET, FILM COATED ORAL at 12:28

## 2022-01-01 RX ADMIN — Medication 2 PUFF(S): at 05:57

## 2022-01-01 RX ADMIN — HEPARIN SODIUM 5000 UNIT(S): 5000 INJECTION INTRAVENOUS; SUBCUTANEOUS at 17:04

## 2022-01-01 RX ADMIN — CITALOPRAM 10 MILLIGRAM(S): 10 TABLET, FILM COATED ORAL at 14:18

## 2022-01-01 RX ADMIN — LEVALBUTEROL 2 PUFF(S): 1.25 SOLUTION, CONCENTRATE RESPIRATORY (INHALATION) at 16:50

## 2022-01-01 RX ADMIN — Medication 1 MILLIGRAM(S): at 13:08

## 2022-01-01 RX ADMIN — HYDROMORPHONE HYDROCHLORIDE 30 MILLILITER(S): 2 INJECTION INTRAMUSCULAR; INTRAVENOUS; SUBCUTANEOUS at 20:13

## 2022-01-01 RX ADMIN — Medication 1 SPRAY(S): at 17:58

## 2022-01-01 RX ADMIN — ENOXAPARIN SODIUM 40 MILLIGRAM(S): 100 INJECTION SUBCUTANEOUS at 17:30

## 2022-01-01 RX ADMIN — Medication 1 SPRAY(S): at 05:25

## 2022-01-01 RX ADMIN — Medication 650 MILLIGRAM(S): at 16:18

## 2022-01-01 RX ADMIN — Medication 1 TABLET(S): at 12:17

## 2022-01-01 RX ADMIN — Medication 1 MILLIGRAM(S): at 12:40

## 2022-01-01 RX ADMIN — SENNA PLUS 1 TABLET(S): 8.6 TABLET ORAL at 13:19

## 2022-01-01 RX ADMIN — Medication 2 PUFF(S): at 16:19

## 2022-01-01 RX ADMIN — Medication 1 SPRAY(S): at 05:02

## 2022-01-01 RX ADMIN — CEFTRIAXONE 1000 MILLIGRAM(S): 500 INJECTION, POWDER, FOR SOLUTION INTRAMUSCULAR; INTRAVENOUS at 15:03

## 2022-01-01 RX ADMIN — BUDESONIDE AND FORMOTEROL FUMARATE DIHYDRATE 2 PUFF(S): 160; 4.5 AEROSOL RESPIRATORY (INHALATION) at 09:20

## 2022-01-01 RX ADMIN — PANTOPRAZOLE SODIUM 40 MILLIGRAM(S): 20 TABLET, DELAYED RELEASE ORAL at 06:08

## 2022-01-01 RX ADMIN — Medication 1 SPRAY(S): at 05:01

## 2022-01-01 RX ADMIN — Medication 600 MILLIGRAM(S): at 05:42

## 2022-01-01 RX ADMIN — SENNA PLUS 1 TABLET(S): 8.6 TABLET ORAL at 11:12

## 2022-01-01 RX ADMIN — Medication 12.5 MILLIGRAM(S): at 18:48

## 2022-01-01 RX ADMIN — SENNA PLUS 1 TABLET(S): 8.6 TABLET ORAL at 14:18

## 2022-01-01 RX ADMIN — Medication 1 TABLET(S): at 17:14

## 2022-01-01 RX ADMIN — POLYETHYLENE GLYCOL 3350 17 GRAM(S): 17 POWDER, FOR SOLUTION ORAL at 11:12

## 2022-01-01 RX ADMIN — Medication 25 MILLIGRAM(S): at 05:11

## 2022-01-01 RX ADMIN — POLYETHYLENE GLYCOL 3350 17 GRAM(S): 17 POWDER, FOR SOLUTION ORAL at 12:19

## 2022-01-01 RX ADMIN — Medication 50 MILLIGRAM(S): at 05:51

## 2022-01-01 RX ADMIN — CEFEPIME 100 MILLIGRAM(S): 1 INJECTION, POWDER, FOR SOLUTION INTRAMUSCULAR; INTRAVENOUS at 21:41

## 2022-01-01 RX ADMIN — Medication 750 MILLIGRAM(S): at 06:10

## 2022-01-01 RX ADMIN — ENOXAPARIN SODIUM 40 MILLIGRAM(S): 100 INJECTION SUBCUTANEOUS at 06:50

## 2022-01-01 RX ADMIN — Medication 650 MILLIGRAM(S): at 05:30

## 2022-01-01 RX ADMIN — Medication 2 PUFF(S): at 09:41

## 2022-01-01 RX ADMIN — HYDROMORPHONE HYDROCHLORIDE 1 MILLIGRAM(S): 2 INJECTION INTRAMUSCULAR; INTRAVENOUS; SUBCUTANEOUS at 17:44

## 2022-01-01 RX ADMIN — PANTOPRAZOLE SODIUM 40 MILLIGRAM(S): 20 TABLET, DELAYED RELEASE ORAL at 05:30

## 2022-01-01 RX ADMIN — CEFEPIME 100 MILLIGRAM(S): 1 INJECTION, POWDER, FOR SOLUTION INTRAMUSCULAR; INTRAVENOUS at 13:22

## 2022-01-01 RX ADMIN — Medication 1 MILLIGRAM(S): at 11:33

## 2022-01-01 RX ADMIN — ENOXAPARIN SODIUM 40 MILLIGRAM(S): 100 INJECTION SUBCUTANEOUS at 18:10

## 2022-01-01 RX ADMIN — Medication 650 MILLIGRAM(S): at 19:49

## 2022-01-01 RX ADMIN — Medication 25 GRAM(S): at 18:35

## 2022-01-01 RX ADMIN — Medication 650 MILLIGRAM(S): at 18:43

## 2022-01-01 RX ADMIN — LEVALBUTEROL 2 PUFF(S): 1.25 SOLUTION, CONCENTRATE RESPIRATORY (INHALATION) at 17:22

## 2022-01-01 RX ADMIN — Medication 200 MILLIGRAM(S): at 12:55

## 2022-01-01 RX ADMIN — Medication 3 MILLILITER(S): at 14:29

## 2022-01-01 RX ADMIN — LEVALBUTEROL 2 PUFF(S): 1.25 SOLUTION, CONCENTRATE RESPIRATORY (INHALATION) at 22:04

## 2022-01-01 RX ADMIN — SENNA PLUS 1 TABLET(S): 8.6 TABLET ORAL at 12:11

## 2022-01-01 RX ADMIN — POTASSIUM PHOSPHATE, MONOBASIC POTASSIUM PHOSPHATE, DIBASIC 62.5 MILLIMOLE(S): 236; 224 INJECTION, SOLUTION INTRAVENOUS at 13:19

## 2022-01-01 RX ADMIN — Medication 1 SPRAY(S): at 17:16

## 2022-01-01 RX ADMIN — Medication 3 MILLIGRAM(S): at 21:26

## 2022-01-01 RX ADMIN — Medication 1 MILLIGRAM(S): at 11:56

## 2022-01-01 RX ADMIN — CEFEPIME 100 MILLIGRAM(S): 1 INJECTION, POWDER, FOR SOLUTION INTRAMUSCULAR; INTRAVENOUS at 12:37

## 2022-01-01 RX ADMIN — ALBUTEROL 2 PUFF(S): 90 AEROSOL, METERED ORAL at 09:45

## 2022-01-01 RX ADMIN — Medication 1 MILLIGRAM(S): at 12:51

## 2022-01-01 RX ADMIN — Medication 650 MILLIGRAM(S): at 15:12

## 2022-01-01 RX ADMIN — ALBUTEROL 2 PUFF(S): 90 AEROSOL, METERED ORAL at 16:57

## 2022-01-01 RX ADMIN — BUDESONIDE AND FORMOTEROL FUMARATE DIHYDRATE 2 PUFF(S): 160; 4.5 AEROSOL RESPIRATORY (INHALATION) at 12:10

## 2022-01-01 RX ADMIN — Medication 2 PUFF(S): at 16:29

## 2022-01-01 RX ADMIN — CITALOPRAM 10 MILLIGRAM(S): 10 TABLET, FILM COATED ORAL at 11:07

## 2022-01-01 RX ADMIN — Medication 6 MILLIGRAM(S): at 06:06

## 2022-01-01 RX ADMIN — Medication 6 MILLIGRAM(S): at 05:59

## 2022-01-01 RX ADMIN — Medication 650 MILLIGRAM(S): at 22:14

## 2022-01-01 RX ADMIN — Medication 650 MILLIGRAM(S): at 13:03

## 2022-01-01 RX ADMIN — Medication 12.5 MILLIGRAM(S): at 09:38

## 2022-01-01 RX ADMIN — Medication 1 SPRAY(S): at 05:58

## 2022-01-01 RX ADMIN — Medication 200 MILLIGRAM(S): at 05:51

## 2022-01-01 RX ADMIN — Medication 3 MILLIGRAM(S): at 21:55

## 2022-01-01 RX ADMIN — LEVALBUTEROL 2 PUFF(S): 1.25 SOLUTION, CONCENTRATE RESPIRATORY (INHALATION) at 12:19

## 2022-01-01 RX ADMIN — Medication 650 MILLIGRAM(S): at 05:42

## 2022-01-01 RX ADMIN — SENNA PLUS 1 TABLET(S): 8.6 TABLET ORAL at 12:14

## 2022-01-01 RX ADMIN — Medication 750 MILLIGRAM(S): at 10:45

## 2022-01-01 RX ADMIN — Medication 1 SPRAY(S): at 17:08

## 2022-01-01 RX ADMIN — ENOXAPARIN SODIUM 40 MILLIGRAM(S): 100 INJECTION SUBCUTANEOUS at 18:16

## 2022-01-01 RX ADMIN — Medication 400 MILLIGRAM(S): at 21:30

## 2022-01-01 RX ADMIN — ALBUTEROL 2 PUFF(S): 90 AEROSOL, METERED ORAL at 09:25

## 2022-01-01 RX ADMIN — PANTOPRAZOLE SODIUM 40 MILLIGRAM(S): 20 TABLET, DELAYED RELEASE ORAL at 06:36

## 2022-01-01 RX ADMIN — Medication 650 MILLIGRAM(S): at 06:36

## 2022-01-01 RX ADMIN — Medication 1 SPRAY(S): at 17:23

## 2022-01-01 RX ADMIN — Medication 20 MILLIGRAM(S): at 16:25

## 2022-01-01 RX ADMIN — SODIUM CHLORIDE 1 GRAM(S): 9 INJECTION INTRAMUSCULAR; INTRAVENOUS; SUBCUTANEOUS at 21:30

## 2022-01-01 RX ADMIN — Medication 1 SPRAY(S): at 17:31

## 2022-01-01 RX ADMIN — SODIUM CHLORIDE 1 GRAM(S): 9 INJECTION INTRAMUSCULAR; INTRAVENOUS; SUBCUTANEOUS at 21:35

## 2022-01-01 RX ADMIN — Medication 650 MILLIGRAM(S): at 13:17

## 2022-01-01 RX ADMIN — Medication 1 DROP(S): at 03:14

## 2022-01-01 RX ADMIN — CITALOPRAM 10 MILLIGRAM(S): 10 TABLET, FILM COATED ORAL at 11:55

## 2022-01-01 RX ADMIN — Medication 200 MILLIGRAM(S): at 05:15

## 2022-01-01 RX ADMIN — CITALOPRAM 10 MILLIGRAM(S): 10 TABLET, FILM COATED ORAL at 11:31

## 2022-01-01 RX ADMIN — BUDESONIDE AND FORMOTEROL FUMARATE DIHYDRATE 2 PUFF(S): 160; 4.5 AEROSOL RESPIRATORY (INHALATION) at 21:41

## 2022-01-01 RX ADMIN — Medication 12.5 MILLIGRAM(S): at 00:11

## 2022-01-01 RX ADMIN — LEVALBUTEROL 2 PUFF(S): 1.25 SOLUTION, CONCENTRATE RESPIRATORY (INHALATION) at 05:56

## 2022-01-01 RX ADMIN — ENOXAPARIN SODIUM 40 MILLIGRAM(S): 100 INJECTION SUBCUTANEOUS at 12:30

## 2022-01-01 RX ADMIN — Medication 3 MILLIGRAM(S): at 21:29

## 2022-01-01 RX ADMIN — CEFEPIME 100 MILLIGRAM(S): 1 INJECTION, POWDER, FOR SOLUTION INTRAMUSCULAR; INTRAVENOUS at 11:06

## 2022-01-01 RX ADMIN — ENOXAPARIN SODIUM 40 MILLIGRAM(S): 100 INJECTION SUBCUTANEOUS at 17:22

## 2022-01-01 RX ADMIN — Medication 600 MILLIGRAM(S): at 17:16

## 2022-01-01 RX ADMIN — LEVALBUTEROL 2 PUFF(S): 1.25 SOLUTION, CONCENTRATE RESPIRATORY (INHALATION) at 09:44

## 2022-01-01 RX ADMIN — SODIUM CHLORIDE 1 GRAM(S): 9 INJECTION INTRAMUSCULAR; INTRAVENOUS; SUBCUTANEOUS at 11:37

## 2022-01-01 RX ADMIN — PANTOPRAZOLE SODIUM 40 MILLIGRAM(S): 20 TABLET, DELAYED RELEASE ORAL at 05:50

## 2022-01-01 RX ADMIN — Medication 1 MILLIGRAM(S): at 11:37

## 2022-01-01 RX ADMIN — Medication 1 SPRAY(S): at 17:06

## 2022-01-01 RX ADMIN — Medication 2 PUFF(S): at 21:45

## 2022-01-01 RX ADMIN — Medication 1 MILLIGRAM(S): at 12:30

## 2022-01-01 RX ADMIN — PANTOPRAZOLE SODIUM 40 MILLIGRAM(S): 20 TABLET, DELAYED RELEASE ORAL at 06:52

## 2022-01-01 RX ADMIN — Medication 1 SPRAY(S): at 18:32

## 2022-01-01 RX ADMIN — Medication 650 MILLIGRAM(S): at 21:42

## 2022-01-01 RX ADMIN — ALBUTEROL 2 PUFF(S): 90 AEROSOL, METERED ORAL at 21:32

## 2022-01-01 RX ADMIN — Medication 650 MILLIGRAM(S): at 06:06

## 2022-01-01 RX ADMIN — LEVALBUTEROL 2 PUFF(S): 1.25 SOLUTION, CONCENTRATE RESPIRATORY (INHALATION) at 22:05

## 2022-01-01 RX ADMIN — CEFTRIAXONE 100 MILLIGRAM(S): 500 INJECTION, POWDER, FOR SOLUTION INTRAMUSCULAR; INTRAVENOUS at 13:13

## 2022-01-01 RX ADMIN — Medication 100 GRAM(S): at 03:25

## 2022-01-01 RX ADMIN — Medication 25 GRAM(S): at 06:11

## 2022-01-01 RX ADMIN — Medication 2 PUFF(S): at 05:56

## 2022-01-01 RX ADMIN — Medication 1 SPRAY(S): at 05:12

## 2022-01-01 RX ADMIN — ENOXAPARIN SODIUM 40 MILLIGRAM(S): 100 INJECTION SUBCUTANEOUS at 17:33

## 2022-01-01 RX ADMIN — LEVALBUTEROL 2 PUFF(S): 1.25 SOLUTION, CONCENTRATE RESPIRATORY (INHALATION) at 09:14

## 2022-01-01 RX ADMIN — Medication 3 MILLIGRAM(S): at 21:38

## 2022-01-01 RX ADMIN — LEVALBUTEROL 2 PUFF(S): 1.25 SOLUTION, CONCENTRATE RESPIRATORY (INHALATION) at 22:24

## 2022-01-01 RX ADMIN — POLYETHYLENE GLYCOL 3350 17 GRAM(S): 17 POWDER, FOR SOLUTION ORAL at 12:26

## 2022-01-01 RX ADMIN — Medication 650 MILLIGRAM(S): at 13:18

## 2022-01-01 RX ADMIN — Medication 200 MILLIGRAM(S): at 16:06

## 2022-01-01 RX ADMIN — Medication 100 MILLIEQUIVALENT(S): at 20:45

## 2022-01-01 RX ADMIN — SODIUM CHLORIDE 1 GRAM(S): 9 INJECTION INTRAMUSCULAR; INTRAVENOUS; SUBCUTANEOUS at 13:12

## 2022-01-01 RX ADMIN — Medication 2 PUFF(S): at 05:34

## 2022-01-01 RX ADMIN — Medication 25 MILLIGRAM(S): at 17:10

## 2022-01-01 RX ADMIN — CITALOPRAM 10 MILLIGRAM(S): 10 TABLET, FILM COATED ORAL at 11:33

## 2022-01-01 RX ADMIN — Medication 400 MILLIGRAM(S): at 21:43

## 2022-01-01 RX ADMIN — LEVALBUTEROL 2 PUFF(S): 1.25 SOLUTION, CONCENTRATE RESPIRATORY (INHALATION) at 16:29

## 2022-01-01 RX ADMIN — Medication 3 MILLIGRAM(S): at 21:04

## 2022-01-01 RX ADMIN — CITALOPRAM 10 MILLIGRAM(S): 10 TABLET, FILM COATED ORAL at 12:54

## 2022-01-01 RX ADMIN — Medication 300 MILLIGRAM(S): at 05:50

## 2022-01-01 RX ADMIN — Medication 1 SPRAY(S): at 18:09

## 2022-01-01 RX ADMIN — CITALOPRAM 10 MILLIGRAM(S): 10 TABLET, FILM COATED ORAL at 11:57

## 2022-01-01 RX ADMIN — Medication 600 MILLIGRAM(S): at 06:43

## 2022-01-01 RX ADMIN — HEPARIN SODIUM 5000 UNIT(S): 5000 INJECTION INTRAVENOUS; SUBCUTANEOUS at 05:00

## 2022-01-01 RX ADMIN — Medication 1 SPRAY(S): at 05:32

## 2022-01-01 RX ADMIN — LIDOCAINE 1 PATCH: 4 CREAM TOPICAL at 20:14

## 2022-01-01 RX ADMIN — SODIUM CHLORIDE 500 MILLILITER(S): 9 INJECTION, SOLUTION INTRAVENOUS at 21:50

## 2022-01-01 RX ADMIN — Medication 1 MILLIGRAM(S): at 11:06

## 2022-01-01 RX ADMIN — Medication 1 SPRAY(S): at 05:14

## 2022-01-01 RX ADMIN — Medication 25 MILLIGRAM(S): at 18:00

## 2022-01-01 RX ADMIN — LIDOCAINE 1 PATCH: 4 CREAM TOPICAL at 00:30

## 2022-01-01 RX ADMIN — Medication 1 TABLET(S): at 09:44

## 2022-01-01 RX ADMIN — Medication 1 SPRAY(S): at 06:52

## 2022-01-01 RX ADMIN — REMDESIVIR 500 MILLIGRAM(S): 5 INJECTION INTRAVENOUS at 16:22

## 2022-01-01 RX ADMIN — Medication 200 MILLIGRAM(S): at 05:42

## 2022-01-01 RX ADMIN — Medication 1 SPRAY(S): at 18:20

## 2022-01-01 RX ADMIN — Medication 1 SPRAY(S): at 17:09

## 2022-01-01 RX ADMIN — Medication 63.75 MILLIMOLE(S): at 17:56

## 2022-01-01 RX ADMIN — Medication 1 SPRAY(S): at 18:44

## 2022-01-01 RX ADMIN — LIDOCAINE 1 PATCH: 4 CREAM TOPICAL at 12:52

## 2022-01-01 RX ADMIN — CITALOPRAM 10 MILLIGRAM(S): 10 TABLET, FILM COATED ORAL at 12:40

## 2022-01-01 RX ADMIN — Medication 2 PUFF(S): at 10:26

## 2022-01-01 RX ADMIN — Medication 50 MILLIGRAM(S): at 05:50

## 2022-01-01 RX ADMIN — LEVALBUTEROL 2 PUFF(S): 1.25 SOLUTION, CONCENTRATE RESPIRATORY (INHALATION) at 21:45

## 2022-01-01 RX ADMIN — Medication 3 MILLIGRAM(S): at 21:35

## 2022-01-01 RX ADMIN — Medication 1 SPRAY(S): at 07:01

## 2022-01-01 RX ADMIN — POLYETHYLENE GLYCOL 3350 17 GRAM(S): 17 POWDER, FOR SOLUTION ORAL at 13:07

## 2022-01-01 RX ADMIN — Medication 3 MILLIGRAM(S): at 21:47

## 2022-01-01 RX ADMIN — Medication 1 TABLET(S): at 18:35

## 2022-01-01 RX ADMIN — SENNA PLUS 1 TABLET(S): 8.6 TABLET ORAL at 11:56

## 2022-01-01 RX ADMIN — SODIUM CHLORIDE 1 GRAM(S): 9 INJECTION INTRAMUSCULAR; INTRAVENOUS; SUBCUTANEOUS at 12:39

## 2022-01-01 RX ADMIN — Medication 50 MILLIGRAM(S): at 05:33

## 2022-01-01 RX ADMIN — Medication 600 MILLIGRAM(S): at 17:29

## 2022-01-01 RX ADMIN — Medication 1 SPRAY(S): at 06:08

## 2022-01-01 RX ADMIN — Medication 1 MILLIGRAM(S): at 14:17

## 2022-01-01 RX ADMIN — Medication 600 MILLIGRAM(S): at 06:07

## 2022-01-01 RX ADMIN — ALBUTEROL 2 PUFF(S): 90 AEROSOL, METERED ORAL at 21:42

## 2022-01-01 RX ADMIN — ALBUTEROL 2 PUFF(S): 90 AEROSOL, METERED ORAL at 09:41

## 2022-01-01 RX ADMIN — Medication 2 PUFF(S): at 21:42

## 2022-01-01 RX ADMIN — Medication 40 MILLIEQUIVALENT(S): at 18:43

## 2022-01-01 RX ADMIN — LEVALBUTEROL 2 PUFF(S): 1.25 SOLUTION, CONCENTRATE RESPIRATORY (INHALATION) at 17:24

## 2022-01-01 RX ADMIN — Medication 1 SPRAY(S): at 05:11

## 2022-01-01 RX ADMIN — SENNA PLUS 1 TABLET(S): 8.6 TABLET ORAL at 11:42

## 2022-01-01 RX ADMIN — Medication 650 MILLIGRAM(S): at 05:11

## 2022-01-01 RX ADMIN — PANTOPRAZOLE SODIUM 40 MILLIGRAM(S): 20 TABLET, DELAYED RELEASE ORAL at 05:03

## 2022-01-01 RX ADMIN — Medication 40 MILLIEQUIVALENT(S): at 05:08

## 2022-01-01 RX ADMIN — Medication 650 MILLIGRAM(S): at 13:01

## 2022-01-01 RX ADMIN — Medication 600 MILLIGRAM(S): at 05:51

## 2022-01-01 RX ADMIN — ALBUTEROL 2 PUFF(S): 90 AEROSOL, METERED ORAL at 09:20

## 2022-01-01 RX ADMIN — Medication 600 MILLIGRAM(S): at 06:08

## 2022-01-01 RX ADMIN — Medication 2 PUFF(S): at 09:21

## 2022-01-01 RX ADMIN — Medication 300 MILLIGRAM(S): at 20:00

## 2022-01-01 RX ADMIN — Medication 1 MILLIGRAM(S): at 13:04

## 2022-01-01 RX ADMIN — HEPARIN SODIUM 5000 UNIT(S): 5000 INJECTION INTRAVENOUS; SUBCUTANEOUS at 06:41

## 2022-01-01 RX ADMIN — LIDOCAINE 1 PATCH: 4 CREAM TOPICAL at 11:57

## 2022-01-01 RX ADMIN — Medication 1 SPRAY(S): at 05:31

## 2022-01-01 RX ADMIN — Medication 37.5 MILLIGRAM(S): at 18:21

## 2022-01-01 RX ADMIN — Medication 650 MILLIGRAM(S): at 16:20

## 2022-01-01 RX ADMIN — PANTOPRAZOLE SODIUM 40 MILLIGRAM(S): 20 TABLET, DELAYED RELEASE ORAL at 06:28

## 2022-01-01 RX ADMIN — BUDESONIDE AND FORMOTEROL FUMARATE DIHYDRATE 2 PUFF(S): 160; 4.5 AEROSOL RESPIRATORY (INHALATION) at 09:21

## 2022-01-01 RX ADMIN — CEFTRIAXONE 100 MILLIGRAM(S): 500 INJECTION, POWDER, FOR SOLUTION INTRAMUSCULAR; INTRAVENOUS at 15:53

## 2022-01-01 RX ADMIN — CITALOPRAM 10 MILLIGRAM(S): 10 TABLET, FILM COATED ORAL at 11:12

## 2022-01-01 RX ADMIN — Medication 600 MILLIGRAM(S): at 18:19

## 2022-01-01 RX ADMIN — ALBUTEROL 2 PUFF(S): 90 AEROSOL, METERED ORAL at 21:46

## 2022-01-01 RX ADMIN — POTASSIUM PHOSPHATE, MONOBASIC POTASSIUM PHOSPHATE, DIBASIC 62.5 MILLIMOLE(S): 236; 224 INJECTION, SOLUTION INTRAVENOUS at 06:41

## 2022-01-01 RX ADMIN — LIDOCAINE 1 PATCH: 4 CREAM TOPICAL at 01:23

## 2022-01-01 RX ADMIN — Medication 25 MILLIGRAM(S): at 17:16

## 2022-01-01 RX ADMIN — Medication 600 MILLIGRAM(S): at 17:23

## 2022-01-01 RX ADMIN — CITALOPRAM 10 MILLIGRAM(S): 10 TABLET, FILM COATED ORAL at 12:52

## 2022-01-01 RX ADMIN — LEVALBUTEROL 2 PUFF(S): 1.25 SOLUTION, CONCENTRATE RESPIRATORY (INHALATION) at 22:14

## 2022-01-01 RX ADMIN — PANTOPRAZOLE SODIUM 40 MILLIGRAM(S): 20 TABLET, DELAYED RELEASE ORAL at 06:02

## 2022-01-01 RX ADMIN — Medication 50 MILLIGRAM(S): at 17:22

## 2022-01-01 RX ADMIN — CITALOPRAM 10 MILLIGRAM(S): 10 TABLET, FILM COATED ORAL at 13:10

## 2022-01-01 RX ADMIN — LIDOCAINE 1 PATCH: 4 CREAM TOPICAL at 23:00

## 2022-01-01 RX ADMIN — LEVALBUTEROL 2 PUFF(S): 1.25 SOLUTION, CONCENTRATE RESPIRATORY (INHALATION) at 10:37

## 2022-01-01 RX ADMIN — LEVALBUTEROL 2 PUFF(S): 1.25 SOLUTION, CONCENTRATE RESPIRATORY (INHALATION) at 09:22

## 2022-01-01 RX ADMIN — Medication 25 MILLIGRAM(S): at 17:05

## 2022-01-01 RX ADMIN — LEVALBUTEROL 2 PUFF(S): 1.25 SOLUTION, CONCENTRATE RESPIRATORY (INHALATION) at 18:12

## 2022-01-01 RX ADMIN — Medication 650 MILLIGRAM(S): at 02:53

## 2022-01-01 RX ADMIN — Medication 1 SPRAY(S): at 17:28

## 2022-01-01 RX ADMIN — Medication 1 SPRAY(S): at 06:17

## 2022-01-01 RX ADMIN — Medication 650 MILLIGRAM(S): at 14:13

## 2022-01-01 RX ADMIN — HEPARIN SODIUM 5000 UNIT(S): 5000 INJECTION INTRAVENOUS; SUBCUTANEOUS at 17:29

## 2022-01-01 RX ADMIN — POLYETHYLENE GLYCOL 3350 17 GRAM(S): 17 POWDER, FOR SOLUTION ORAL at 11:36

## 2022-01-01 RX ADMIN — CITALOPRAM 10 MILLIGRAM(S): 10 TABLET, FILM COATED ORAL at 12:10

## 2022-01-01 RX ADMIN — CEFEPIME 100 MILLIGRAM(S): 1 INJECTION, POWDER, FOR SOLUTION INTRAMUSCULAR; INTRAVENOUS at 13:12

## 2022-01-01 RX ADMIN — Medication 1 PACKET(S): at 23:58

## 2022-01-01 RX ADMIN — LIDOCAINE 1 PATCH: 4 CREAM TOPICAL at 12:40

## 2022-01-01 RX ADMIN — Medication 37.5 MILLIGRAM(S): at 06:16

## 2022-01-01 RX ADMIN — Medication 50 MILLIGRAM(S): at 06:06

## 2022-01-01 RX ADMIN — Medication 650 MILLIGRAM(S): at 09:19

## 2022-01-01 RX ADMIN — HEPARIN SODIUM 5000 UNIT(S): 5000 INJECTION INTRAVENOUS; SUBCUTANEOUS at 05:02

## 2022-01-01 RX ADMIN — HEPARIN SODIUM 5000 UNIT(S): 5000 INJECTION INTRAVENOUS; SUBCUTANEOUS at 18:32

## 2022-01-01 RX ADMIN — ALBUTEROL 2 PUFF(S): 90 AEROSOL, METERED ORAL at 18:16

## 2022-01-01 RX ADMIN — LIDOCAINE 1 PATCH: 4 CREAM TOPICAL at 12:44

## 2022-01-01 RX ADMIN — Medication 1 MILLIGRAM(S): at 11:31

## 2022-01-01 RX ADMIN — HEPARIN SODIUM 5000 UNIT(S): 5000 INJECTION INTRAVENOUS; SUBCUTANEOUS at 05:07

## 2022-01-01 RX ADMIN — CITALOPRAM 10 MILLIGRAM(S): 10 TABLET, FILM COATED ORAL at 11:42

## 2022-01-01 RX ADMIN — Medication 1 SPRAY(S): at 17:18

## 2022-01-01 RX ADMIN — Medication 650 MILLIGRAM(S): at 22:06

## 2022-01-01 RX ADMIN — LIDOCAINE 1 PATCH: 4 CREAM TOPICAL at 17:44

## 2022-01-01 RX ADMIN — Medication 1 SPRAY(S): at 18:35

## 2022-01-01 RX ADMIN — Medication 650 MILLIGRAM(S): at 10:22

## 2022-01-01 RX ADMIN — HEPARIN SODIUM 5000 UNIT(S): 5000 INJECTION INTRAVENOUS; SUBCUTANEOUS at 17:55

## 2022-01-01 RX ADMIN — PANTOPRAZOLE SODIUM 40 MILLIGRAM(S): 20 TABLET, DELAYED RELEASE ORAL at 05:43

## 2022-01-01 RX ADMIN — CITALOPRAM 10 MILLIGRAM(S): 10 TABLET, FILM COATED ORAL at 18:55

## 2022-01-01 RX ADMIN — Medication 650 MILLIGRAM(S): at 13:59

## 2022-01-01 RX ADMIN — Medication 200 MILLIGRAM(S): at 23:09

## 2022-01-01 RX ADMIN — Medication 2 PUFF(S): at 09:44

## 2022-01-01 RX ADMIN — Medication 300 MILLIGRAM(S): at 04:15

## 2022-01-01 RX ADMIN — HEPARIN SODIUM 5000 UNIT(S): 5000 INJECTION INTRAVENOUS; SUBCUTANEOUS at 05:25

## 2022-01-01 RX ADMIN — Medication 650 MILLIGRAM(S): at 19:13

## 2022-01-01 RX ADMIN — ALBUTEROL 2 PUFF(S): 90 AEROSOL, METERED ORAL at 17:23

## 2022-01-01 RX ADMIN — HEPARIN SODIUM 5000 UNIT(S): 5000 INJECTION INTRAVENOUS; SUBCUTANEOUS at 18:22

## 2022-01-01 RX ADMIN — HYDROMORPHONE HYDROCHLORIDE 1 MILLIGRAM(S): 2 INJECTION INTRAMUSCULAR; INTRAVENOUS; SUBCUTANEOUS at 15:43

## 2022-01-01 RX ADMIN — SODIUM CHLORIDE 1 GRAM(S): 9 INJECTION INTRAMUSCULAR; INTRAVENOUS; SUBCUTANEOUS at 11:55

## 2022-01-01 RX ADMIN — Medication 1 MILLIGRAM(S): at 11:40

## 2022-01-01 RX ADMIN — SENNA PLUS 1 TABLET(S): 8.6 TABLET ORAL at 11:07

## 2022-01-01 RX ADMIN — Medication 2 PUFF(S): at 09:22

## 2022-01-01 RX ADMIN — Medication 1 DROP(S): at 09:30

## 2022-01-01 RX ADMIN — Medication 6 MILLIGRAM(S): at 05:42

## 2022-01-01 RX ADMIN — Medication 1 SPRAY(S): at 05:16

## 2022-01-01 RX ADMIN — LIDOCAINE 1 PATCH: 4 CREAM TOPICAL at 11:36

## 2022-01-01 RX ADMIN — CITALOPRAM 10 MILLIGRAM(S): 10 TABLET, FILM COATED ORAL at 13:58

## 2022-01-01 RX ADMIN — Medication 1 SPRAY(S): at 08:17

## 2022-01-01 RX ADMIN — LIDOCAINE 1 PATCH: 4 CREAM TOPICAL at 00:46

## 2022-01-01 RX ADMIN — LEVALBUTEROL 2 PUFF(S): 1.25 SOLUTION, CONCENTRATE RESPIRATORY (INHALATION) at 10:26

## 2022-01-01 RX ADMIN — Medication 100 MILLIEQUIVALENT(S): at 06:01

## 2022-01-01 RX ADMIN — Medication 1 SPRAY(S): at 17:19

## 2022-01-01 RX ADMIN — Medication 25 MILLIGRAM(S): at 17:58

## 2022-01-01 RX ADMIN — PANTOPRAZOLE SODIUM 40 MILLIGRAM(S): 20 TABLET, DELAYED RELEASE ORAL at 05:25

## 2022-01-01 RX ADMIN — Medication 650 MILLIGRAM(S): at 22:05

## 2022-01-01 RX ADMIN — Medication 650 MILLIGRAM(S): at 21:31

## 2022-01-01 RX ADMIN — SENNA PLUS 1 TABLET(S): 8.6 TABLET ORAL at 12:27

## 2022-01-01 RX ADMIN — CEFEPIME 100 MILLIGRAM(S): 1 INJECTION, POWDER, FOR SOLUTION INTRAMUSCULAR; INTRAVENOUS at 21:29

## 2022-01-01 RX ADMIN — Medication 1 SPRAY(S): at 17:43

## 2022-01-01 RX ADMIN — Medication 6 MILLIGRAM(S): at 05:51

## 2022-01-01 RX ADMIN — Medication 3 MILLIGRAM(S): at 22:06

## 2022-01-01 RX ADMIN — Medication 2 PUFF(S): at 18:13

## 2022-01-01 RX ADMIN — LEVALBUTEROL 2 PUFF(S): 1.25 SOLUTION, CONCENTRATE RESPIRATORY (INHALATION) at 05:36

## 2022-01-01 RX ADMIN — Medication 1 SPRAY(S): at 18:31

## 2022-01-01 RX ADMIN — LEVALBUTEROL 2 PUFF(S): 1.25 SOLUTION, CONCENTRATE RESPIRATORY (INHALATION) at 05:31

## 2022-01-01 RX ADMIN — HEPARIN SODIUM 5000 UNIT(S): 5000 INJECTION INTRAVENOUS; SUBCUTANEOUS at 05:16

## 2022-01-01 RX ADMIN — SODIUM CHLORIDE 1 GRAM(S): 9 INJECTION INTRAMUSCULAR; INTRAVENOUS; SUBCUTANEOUS at 12:35

## 2022-01-01 RX ADMIN — Medication 600 MILLIGRAM(S): at 17:07

## 2022-01-01 RX ADMIN — Medication 650 MILLIGRAM(S): at 21:48

## 2022-01-01 RX ADMIN — CITALOPRAM 10 MILLIGRAM(S): 10 TABLET, FILM COATED ORAL at 13:19

## 2022-01-01 RX ADMIN — Medication 1 SPRAY(S): at 05:57

## 2022-01-01 RX ADMIN — PANTOPRAZOLE SODIUM 40 MILLIGRAM(S): 20 TABLET, DELAYED RELEASE ORAL at 06:50

## 2022-01-01 RX ADMIN — CEFEPIME 100 MILLIGRAM(S): 1 INJECTION, POWDER, FOR SOLUTION INTRAMUSCULAR; INTRAVENOUS at 05:10

## 2022-01-01 RX ADMIN — Medication 650 MILLIGRAM(S): at 21:05

## 2022-01-01 RX ADMIN — Medication 20 MILLIGRAM(S): at 13:04

## 2022-01-01 RX ADMIN — Medication 650 MILLIGRAM(S): at 22:24

## 2022-01-01 RX ADMIN — Medication 1 MILLIGRAM(S): at 12:11

## 2022-01-01 RX ADMIN — Medication 2 PUFF(S): at 11:39

## 2022-01-01 RX ADMIN — POLYETHYLENE GLYCOL 3350 17 GRAM(S): 17 POWDER, FOR SOLUTION ORAL at 13:19

## 2022-01-01 RX ADMIN — Medication 300 MILLIGRAM(S): at 10:15

## 2022-01-01 RX ADMIN — LIDOCAINE 1 PATCH: 4 CREAM TOPICAL at 20:00

## 2022-01-01 RX ADMIN — REMDESIVIR 500 MILLIGRAM(S): 5 INJECTION INTRAVENOUS at 16:25

## 2022-01-01 RX ADMIN — HYDROMORPHONE HYDROCHLORIDE 1 MILLIGRAM(S): 2 INJECTION INTRAMUSCULAR; INTRAVENOUS; SUBCUTANEOUS at 16:17

## 2022-01-01 RX ADMIN — Medication 650 MILLIGRAM(S): at 14:38

## 2022-01-01 RX ADMIN — Medication 25 MILLIGRAM(S): at 05:57

## 2022-01-01 RX ADMIN — Medication 600 MILLIGRAM(S): at 05:30

## 2022-01-01 RX ADMIN — BUDESONIDE AND FORMOTEROL FUMARATE DIHYDRATE 2 PUFF(S): 160; 4.5 AEROSOL RESPIRATORY (INHALATION) at 21:01

## 2022-01-01 RX ADMIN — HEPARIN SODIUM 5000 UNIT(S): 5000 INJECTION INTRAVENOUS; SUBCUTANEOUS at 18:01

## 2022-01-01 RX ADMIN — ENOXAPARIN SODIUM 40 MILLIGRAM(S): 100 INJECTION SUBCUTANEOUS at 18:13

## 2022-01-01 RX ADMIN — Medication 50 MILLIGRAM(S): at 17:41

## 2022-01-01 RX ADMIN — HYDROMORPHONE HYDROCHLORIDE 1 MILLIGRAM(S): 2 INJECTION INTRAMUSCULAR; INTRAVENOUS; SUBCUTANEOUS at 11:11

## 2022-01-01 RX ADMIN — Medication 100 MILLIEQUIVALENT(S): at 03:35

## 2022-01-01 RX ADMIN — Medication 40 MILLIEQUIVALENT(S): at 03:38

## 2022-01-01 RX ADMIN — ALBUTEROL 2 PUFF(S): 90 AEROSOL, METERED ORAL at 05:31

## 2022-01-01 RX ADMIN — Medication 2 PUFF(S): at 05:43

## 2022-01-01 RX ADMIN — Medication 650 MILLIGRAM(S): at 03:23

## 2022-01-01 RX ADMIN — Medication 750 MILLIGRAM(S): at 20:30

## 2022-01-01 RX ADMIN — HEPARIN SODIUM 5000 UNIT(S): 5000 INJECTION INTRAVENOUS; SUBCUTANEOUS at 17:08

## 2022-01-01 RX ADMIN — LIDOCAINE 1 PATCH: 4 CREAM TOPICAL at 08:00

## 2022-01-01 RX ADMIN — Medication 1 DROP(S): at 21:00

## 2022-01-01 RX ADMIN — Medication 50 MILLIGRAM(S): at 18:19

## 2022-01-01 RX ADMIN — BUDESONIDE AND FORMOTEROL FUMARATE DIHYDRATE 2 PUFF(S): 160; 4.5 AEROSOL RESPIRATORY (INHALATION) at 09:41

## 2022-01-01 RX ADMIN — Medication 650 MILLIGRAM(S): at 06:08

## 2022-01-01 RX ADMIN — Medication 100 MILLIEQUIVALENT(S): at 22:00

## 2022-01-01 RX ADMIN — ENOXAPARIN SODIUM 40 MILLIGRAM(S): 100 INJECTION SUBCUTANEOUS at 17:23

## 2022-01-01 RX ADMIN — Medication 1 SPRAY(S): at 05:49

## 2022-01-01 RX ADMIN — SODIUM CHLORIDE 1 GRAM(S): 9 INJECTION INTRAMUSCULAR; INTRAVENOUS; SUBCUTANEOUS at 05:14

## 2022-01-18 DIAGNOSIS — U07.1 COVID-19: ICD-10-CM

## 2022-01-18 RX ORDER — BENZONATATE 100 MG/1
100 CAPSULE ORAL 3 TIMES DAILY
Qty: 60 | Refills: 0 | Status: ACTIVE | COMMUNITY
Start: 2022-01-18 | End: 1900-01-01

## 2022-02-24 NOTE — PROGRESS NOTE ADULT - SUBJECTIVE AND OBJECTIVE BOX
PROGRESS NOTE:   Authored by Dr. Phyllis Jorgensen MD (PGY-1). Pager [facility] [phone]/ [facility]     Patient is a 80y old  Female who presents with a chief complaint of fall, thrombocytopenia, ankle fracture (03 Aug 2021 09:18)      SUBJECTIVE / OVERNIGHT EVENTS:  No acute events overnight.     ADDITIONAL REVIEW OF SYSTEMS:  Patient denies fevers, chills, chest pain, shortness of breath, nausea, abdominal pain, diarrhea, constipation, dysuria, leg swelling, headache, light headedness.    MEDICATIONS  (STANDING):  artificial tears (preservative free) Ophthalmic Solution 1 Drop(s) Both EYES three times a day  citalopram 10 milliGRAM(s) Oral daily  cyanocobalamin 1000 MICROGram(s) Oral daily  ferrous    sulfate 325 milliGRAM(s) Oral daily  FIRST- Mouthwash  BLM 10 milliLiter(s) Swish and Spit three times a day  folic acid 1 milliGRAM(s) Oral daily  melatonin 3 milliGRAM(s) Oral at bedtime  nystatin    Suspension 669366 Unit(s) Swish and Swallow four times a day  Saliva Substitute (CAPHOSOL) 30 milliLiter(s) Swish and Spit daily  valACYclovir 1000 milliGRAM(s) Oral two times a day    MEDICATIONS  (PRN):  acetaminophen    Suspension .. 660 milliGRAM(s) Oral every 6 hours PRN Mild Pain (1 - 3), Moderate Pain (4 - 6)  metoclopramide 5 milliGRAM(s) Oral every 8 hours PRN nausea/vomiting      CAPILLARY BLOOD GLUCOSE        I&O's Summary      PHYSICAL EXAM:  Vital Signs Last 24 Hrs  T(C): 36.7 (04 Aug 2021 02:36), Max: 37 (03 Aug 2021 19:30)  T(F): 98 (04 Aug 2021 02:36), Max: 98.6 (03 Aug 2021 19:30)  HR: 88 (04 Aug 2021 02:36) (74 - 91)  BP: 138/61 (04 Aug 2021 02:36) (131/60 - 151/56)  BP(mean): --  RR: 18 (04 Aug 2021 02:36) (16 - 18)  SpO2: 100% (04 Aug 2021 02:36) (98% - 100%)    CONSTITUTIONAL: NAD, well-developed  HEET: MMM, EOMI, PERRLA  NECK: supple  RESPIRATORY: Normal respiratory effort; lungs are clear to auscultation bilaterally  CARDIOVASCULAR: Regular rate and rhythm, normal S1 and S2, no murmur/rub/gallop; No lower extremity edema; Peripheral pulses are 2+ bilaterally  ABDOMEN: Nontender to palpation, normoactive bowel sounds, no rebound/guarding; No hepatosplenomegaly  MUSCULOSKELETAL: no clubbing or cyanosis of digits; no joint swelling or tenderness to palpation  NEURO: Moving all four extremities, sensation grossly intact  PSYCH: A+O to person, place, and time; affect appropriate  SKIN: No rash    LABS:                        7.1    4.57  )-----------( 67       ( 03 Aug 2021 06:43 )             22.1     08-03    141  |  110<H>  |  11  ----------------------------<  91  4.4   |  17<L>  |  0.55    Ca    8.5      03 Aug 2021 06:23  Phos  3.0     08-03  Mg     1.80     08-03                  Tele Reviewed:    RADIOLOGY & ADDITIONAL TESTS:  Results Reviewed:   Imaging Personally Reviewed:  Electrocardiogram Personally Reviewed:     PROGRESS NOTE:   Authored by Dr. Phyllis Jorgensen MD (PGY-1). Pager [facility] [phone]/ [facility]     Patient is a 80y old  Female who presents with a chief complaint of fall, thrombocytopenia, ankle fracture (03 Aug 2021 09:18)      SUBJECTIVE / OVERNIGHT EVENTS:  No acute events overnight.     ADDITIONAL REVIEW OF SYSTEMS:  Patient denies fevers, chills, chest pain, shortness of breath, nausea, abdominal pain, diarrhea, constipation, dysuria, leg swelling, headache, light headedness.    MEDICATIONS  (STANDING):  artificial tears (preservative free) Ophthalmic Solution 1 Drop(s) Both EYES three times a day  citalopram 10 milliGRAM(s) Oral daily  cyanocobalamin 1000 MICROGram(s) Oral daily  ferrous    sulfate 325 milliGRAM(s) Oral daily  FIRST- Mouthwash  BLM 10 milliLiter(s) Swish and Spit three times a day  folic acid 1 milliGRAM(s) Oral daily  melatonin 3 milliGRAM(s) Oral at bedtime  nystatin    Suspension 004001 Unit(s) Swish and Swallow four times a day  Saliva Substitute (CAPHOSOL) 30 milliLiter(s) Swish and Spit daily  valACYclovir 1000 milliGRAM(s) Oral two times a day    MEDICATIONS  (PRN):  acetaminophen    Suspension .. 660 milliGRAM(s) Oral every 6 hours PRN Mild Pain (1 - 3), Moderate Pain (4 - 6)  metoclopramide 5 milliGRAM(s) Oral every 8 hours PRN nausea/vomiting      CAPILLARY BLOOD GLUCOSE        I&O's Summary      PHYSICAL EXAM:  Vital Signs Last 24 Hrs  T(C): 36.7 (04 Aug 2021 02:36), Max: 37 (03 Aug 2021 19:30)  T(F): 98 (04 Aug 2021 02:36), Max: 98.6 (03 Aug 2021 19:30)  HR: 88 (04 Aug 2021 02:36) (74 - 91)  BP: 138/61 (04 Aug 2021 02:36) (131/60 - 151/56)  BP(mean): --  RR: 18 (04 Aug 2021 02:36) (16 - 18)  SpO2: 100% (04 Aug 2021 02:36) (98% - 100%)    CONSTITUTIONAL: NAD, well-developed  HEET: MMM, EOMI, PERRLA  NECK: supple  RESPIRATORY: Normal respiratory effort; lungs are clear to auscultation bilaterally  CARDIOVASCULAR: Regular rate and rhythm, normal S1 and S2, no murmur/rub/gallop; No lower extremity edema; Peripheral pulses are 2+ bilaterally  ABDOMEN: Nontender to palpation, normoactive bowel sounds, no rebound/guarding; No hepatosplenomegaly  MUSCULOSKELETAL: no clubbing or cyanosis of digits; no joint swelling or tenderness to palpation  NEURO: Moving all four extremities, sensation grossly intact  PSYCH: A+O to person, place, and time; affect appropriate  SKIN: No rash    LABS:                        8.3    5.05  )-----------( 111      ( 04 Aug 2021 11:02 )             24.9   08-04    142  |  108<H>  |  12  ----------------------------<  80  3.5   |  19<L>  |  0.59    Ca    8.5      04 Aug 2021 08:07  Phos  3.1     08-04  Mg     1.90     08-04                  Tele Reviewed:    RADIOLOGY & ADDITIONAL TESTS:  Results Reviewed:   Imaging Personally Reviewed:  Electrocardiogram Personally Reviewed:     85 PROGRESS NOTE:   Authored by Dr. Phyllis Jorgensen MD (PGY-1). Pager [facility] [phone]/ [facility]     Patient is a 80y old  Female who presents with a chief complaint of fall, thrombocytopenia, ankle fracture (03 Aug 2021 09:18)      SUBJECTIVE / OVERNIGHT EVENTS:  Patient says right knee pain has improved. Endorses weakness and lightheadedness when she got out of bed this morning to use the commode.  Complaining of pain associated with mouth sores that has improved since yesterday. Also complaining of reflux after eating breakfast this morning. Says she takes omeprazole at home for reflux.      ADDITIONAL REVIEW OF SYSTEMS:  Patient denies fevers, chills, chest pain, shortness of breath, nausea, abdominal pain, diarrhea, constipation, dysuria, leg swelling, headache, light headedness.    MEDICATIONS  (STANDING):  artificial tears (preservative free) Ophthalmic Solution 1 Drop(s) Both EYES three times a day  citalopram 10 milliGRAM(s) Oral daily  cyanocobalamin 1000 MICROGram(s) Oral daily  ferrous    sulfate 325 milliGRAM(s) Oral daily  FIRST- Mouthwash  BLM 10 milliLiter(s) Swish and Spit three times a day  folic acid 1 milliGRAM(s) Oral daily  melatonin 3 milliGRAM(s) Oral at bedtime  nystatin    Suspension 124006 Unit(s) Swish and Swallow four times a day  Saliva Substitute (CAPHOSOL) 30 milliLiter(s) Swish and Spit daily  valACYclovir 1000 milliGRAM(s) Oral two times a day    MEDICATIONS  (PRN):  acetaminophen    Suspension .. 660 milliGRAM(s) Oral every 6 hours PRN Mild Pain (1 - 3), Moderate Pain (4 - 6)  metoclopramide 5 milliGRAM(s) Oral every 8 hours PRN nausea/vomiting      CAPILLARY BLOOD GLUCOSE        I&O's Summary      PHYSICAL EXAM:  Vital Signs Last 24 Hrs  T(C): 36.7 (04 Aug 2021 02:36), Max: 37 (03 Aug 2021 19:30)  T(F): 98 (04 Aug 2021 02:36), Max: 98.6 (03 Aug 2021 19:30)  HR: 88 (04 Aug 2021 02:36) (74 - 91)  BP: 138/61 (04 Aug 2021 02:36) (131/60 - 151/56)  BP(mean): --  RR: 18 (04 Aug 2021 02:36) (16 - 18)  SpO2: 100% (04 Aug 2021 02:36) (98% - 100%)    CONSTITUTIONAL: NAD, well-developed  HEET: MMM, EOMI, PERRLA, mouth sore on tip of tongue  NECK: supple  RESPIRATORY: Normal respiratory effort; lungs are clear to auscultation bilaterally  CARDIOVASCULAR: Regular rate and rhythm, normal S1 and S2, no murmur/rub/gallop; No lower extremity edema; Peripheral pulses are 2+ bilaterally  ABDOMEN: Nontender to palpation, normoactive bowel sounds, no rebound/guarding; No hepatosplenomegaly  MUSCULOSKELETAL: tenderness to palpation of medial aspect of right knee, swelling unchanged from yesterday, pain with flexion; no clubbing or cyanosis of digits; no joint swelling or tenderness to palpation; LLE splinted and wrapped  NEURO: Moving all four extremities, sensation grossly intact  PSYCH: A+O to person, place, and time; cooperative, pleasant  SKIN: No rash    LABS:                        8.3    5.05  )-----------( 111      ( 04 Aug 2021 11:02 )             24.9   08-04    142  |  108<H>  |  12  ----------------------------<  80  3.5   |  19<L>  |  0.59    Ca    8.5      04 Aug 2021 08:07  Phos  3.1     08-04  Mg     1.90     08-04                  Tele Reviewed:    RADIOLOGY & ADDITIONAL TESTS:  Results Reviewed:   Imaging Personally Reviewed:  Electrocardiogram Personally Reviewed:

## 2022-02-24 NOTE — ED PROVIDER NOTE - NEUROLOGICAL, MLM
Kai Massey needs to be seen for an appointment before further refills are given.  
69
Alert and oriented, no focal deficits, no motor or sensory deficits.

## 2022-03-10 NOTE — H&P ADULT - NSHPSOCIALHISTORY_GEN_ALL_CORE
all other ROS negative except as per HPI lives at home alone  indendent adls  never smoker/alcohol/drugs

## 2022-04-20 NOTE — ASSESSMENT
[FreeTextEntry1] : Ms. Magallon is a 79 yo female with PMHx of  ANCA vasculitis (dx 20 years ago, treated with Azathioprine since then, being followed by Rheum outpt) who presents today for initial consultation for newly Diagnosed Diffuse Large B cell Lymphoma. Pt presented tot he ER on 4/13/21 with chest pain and exertional shortness of breath. Patient reported recent travel to [place] in car (~10 hrs ride). She had a CT angio done which showed right lower lobe and middle lobe pulmonary arterial emboli. She was noted to have B/l LE DVTs - she was started on heparin and switched to Eliquis. Pt need to have chest tube placement as well, discharged with home O2.\par \par In addition pt was found to have large b/l lung masses measuring up to 8.6cm. \par Pulm was consulted and pt underwent biopsy/VATs on 4/16/21, path showed  EBV+ diffuse large B-cell lymphoma. Lymphoma cells are B cells, positive VEENA, CD20, CD79a, Pax5 (dim), Mum-1, Bcl2, CD43,variable CD30, negative CD5, CD10, CD23, cyclin D1, p53 (<5%), cMYC (<20%). Ki-67 proliferation index is high (>90%). CD3+ T cells comprise a minor population in the infiltrate.  stains few plasma cells; B cells are lambda-restricted. \par \par Patient is s/p  CHOP on 5/19,  Rituxan and IT MTX on 5/20 at [facility]. She received Cycle 2 as outpatient. Cycle 4 was complicated by significant neutropenia, thrombocytopenia and fall which caused an ankle fracture. She was discharged form [facility] to [facility]. she left [facility] 1 weeks ago and is nearly independent with all IADL/ADls and is moving well. She received C5 fo R-miniCHOP which was overall well tolerated. She completed treatment on 9/9/21 and PET/CT demonstrated further response to treatment. \par \par #Diffuse Large B Cell Lymphoma EBV positive\par -PET/CT 5/10/21: Status post wedge resection of left upper lobe lung nodule, as compared to CT dated 4/13/2021. Additional FDG-avid, partially necrotic bilateral lung masses and mildly avid groundglass opacities are not significantly changed as compared to prior CT, compatible with biopsy-proven lymphoma. 2. Few mildly FDG-avid, mildly enlarged mediastinal and bilateral hilar lymph nodes are nonspecific. 3. Previously seen indeterminate 3.1 cm lesion in the upper pole of right kidney s FDG-avid. 4. FDG-avid left adrenal nodule is indeterminate. \par -MRI 5/17/21: Right upper pole renal lesion significantly decreased in size of uncertain etiology. A 1.3 cm left adrenal nodule remains indeterminate.\par -s/p C1 RCHOP with IT MTX on 5/19 and 5/20\par - Echo was done inpatient on 4/21/21 with an EF of 69%\par [place] [facility] [place]\par -s/p C4 RCHOP on 7/22/21\par -received C5 of R-miniCHOP on 8/19/21 and C6 on 9/9/21\par -Interim PET/CT 6/25/21 shows no progression of disease and is responding to treatment. \par -EOT PET/CT done 9/27/21 shows Interval further decrease in size and FDG avidity of left upper lung nodule (Deauville 3) and resolution of mildly FDG avid right upper lobe nodule.\par -surveillance CT 12/2021: A left upper lobe lung nodule is stable to slightly decreased in size since 9/27/2021 PET/CT. Mediastinal lymph nodes are also similar. Groundglass and reticular opacities in the lower lobes bilaterally and left upper lobe of uncertain etiology, similar in appearance to prior CT dated 4/13/2021.\par A previously noted indeterminate right renal mass has decreased in size, currently measuring 0.6 cm. A previously noted left adrenal nodule has also decreased in size.\par New mild compression deformity of the superior endplate of L5. Unchanged compression deformity of L1.\par Moderate hiatal hernia with partially intrathoracic stomach.\par -Will plan for surveillance CT scan in the coming week\par -c/w port flushes\par -EBV level now undetectable, will check at each visit \par -mouth sores are resolved\par \par #PE/DVT\par - can d/c Eliquis (has been on AC for 1 year)\par \par #Right Kidney lesion\par - Pt was noted to have a hypodense lesion on right upper pole on CT done 4/13/21\par - consider MRI after interim PET/CT for further characterization, suspect this could be related to DLBCL\par \par #ANCA positive vasculitis \par -hold azathioprine\par -discussed case with Dr. Romano- rituximab should maintain her remission and will monitor symptoms after completion of therapy \par -f/u rheum\par -monitor for symptoms of recurrence (bone/back pain) \par \par RTO in 3 months ,sooner PRN\par

## 2022-04-20 NOTE — PHYSICAL EXAM
[Restricted in physically strenuous activity but ambulatory and able to carry out work of a light or sedentary nature] : Status 1- Restricted in physically strenuous activity but ambulatory and able to carry out work of a light or sedentary nature, e.g., light house work, office work [Normal] : affect appropriate [de-identified] : lesions on b/l LE - small, irregular shape, with erythema and scales - stable, she is following up with Derm

## 2022-04-20 NOTE — END OF VISIT
[FreeTextEntry3] : Patient seen and case discussed with MARGRET Prado. I agree with above and have edited the note where needed.\par  [Time Spent: ___ minutes] : I have spent [unfilled] minutes of time on the encounter.

## 2022-04-20 NOTE — HISTORY OF PRESENT ILLNESS
[de-identified] : Ms. Magallon is a 81 yo female with PMHx of ANCA vasculitis (dx 20 years ago, been on Azathioprine since then, being followed by Rheum outpt) who presents today for initial consultation for newly Diagnosed Diffuse Large B cell Lymphoma. Pt noticed earlier this year that she had more SOB with activity and fatigue. She did not have night sweats, fever/chills or weight loss with this fatigue.\par Pt presented tot he ER on 4/13/21 with chest pain lasting and exertional shortness of breath. Patient reported recent travel to [place] in car (~10 hrs ride). She had a CT angio done which showed right lower lobe and middle lobe pulmonary arterial emboli. She was noted to have B/l LE DVTs - she was started on heparin and switched to Eliquis. Pt need to have chest tube placement as well and was d/c on O2 therapy. Pt states she used O2 for the first few days at home but does not currently use it. O2 stats at home have been in the 90s\par In addition pt was found to have large b/l lung masses measuring up to 8.6cm. \par Pulm was consulted and pt underwent biopsy/VATs on 4/16/21, path showed  EBV+ diffuse large B-cell lymphoma. Lymphoma cells are B cells, positive VEENA, CD20, CD79a, Pax5 (dim), Mum-1, Bcl2, CD43,variable CD30, negative CD5, CD10, CD23, cyclin D1, p53 (<5%), cMYC (<20%). Ki-67 proliferation index is high (>90%). CD3+ T cells comprise a minor population in the infiltrate.  stains few plasma cells; B cells are lambda-restricted. Cam 5.2, P40, TTF-1, synaptophysin, chromogranin, PAX-8 and MALU-3 were performed on block 2C. They are negative in lymphoma cells.\par \par She received Cycle 1 of R-CHOP on 5/20/21 while admitted to monitor her respiratory function. She had a LP with IT MTX which did not demonstrate involvement with lymphoma.  She received C2-C3 only complicated by fatigue. After C4, she became neutropenic and profoundly fatigued. She has a fall at home prior to presenting to [facility] for IVF hydration. At that time, platelets were 10K. She was sent ot the ER and admitted for cytopenias and new ankle fracture. She was discharged to rehab at [facility]. She received C5 of mini-R-CHOP given her prior complications which was overall well tolerated and completed therapy on 9/9/21 (6 cycles). \par \par \par  [de-identified] : S/p  Cycle 6 R-mini CHOP on 9/9/21:\par 4/19/22: Patient has returned from [place] this month. S/p port flush today. She feels great since her last treatment. She reports she had bleeding with bm on Friday-she endorses history of hemorrhoids and is following up with her colorectal surgeon this Thursday.  No fevers/chills. No drenching night sweats. Patient reports her appetite is excellent. Weight stable.No abd pain. No N/V/D. No numbness/tingling. No CP. No palpitations. Her breathing has improved. No swelling. No bleeding/easy bruising. No dysuria.  \par

## 2022-06-07 NOTE — PHYSICAL THERAPY INITIAL EVALUATION ADULT - LEVEL OF INDEPENDENCE: SIT/SUPINE, REHAB EVAL
Patient informed and states understanding. Denies any questions. Patient informed to contact our office with any concerns or questions.   +assistance to left LE/minimum assist (75% patients effort)

## 2022-07-25 NOTE — ASSESSMENT
[FreeTextEntry1] : Ms. Magallon is a 80 yo female with PMHx of  ANCA vasculitis (dx 20 years ago, treated with Azathioprine since then, being followed by Rheum outpt) who presents today for initial consultation for newly Diagnosed Diffuse Large B cell Lymphoma. Pt presented tot he ER on 4/13/21 with chest pain and exertional shortness of breath. Patient reported recent travel to [place] in car (~10 hrs ride). She had a CT angio done which showed right lower lobe and middle lobe pulmonary arterial emboli. She was noted to have B/l LE DVTs - she was started on heparin and switched to Eliquis. Pt need to have chest tube placement as well, discharged with home O2.\par \par In addition pt was found to have large b/l lung masses measuring up to 8.6cm. \par Pulm was consulted and pt underwent biopsy/VATs on 4/16/21, path showed  EBV+ diffuse large B-cell lymphoma. Lymphoma cells are B cells, positive VEENA, CD20, CD79a, Pax5 (dim), Mum-1, Bcl2, CD43,variable CD30, negative CD5, CD10, CD23, cyclin D1, p53 (<5%), cMYC (<20%). Ki-67 proliferation index is high (>90%). CD3+ T cells comprise a minor population in the infiltrate.  stains few plasma cells; B cells are lambda-restricted. \par \par Patient is s/p  CHOP on 5/19,  Rituxan and IT MTX on 5/20 at [facility]. She received Cycle 2 as outpatient. Cycle 4 was complicated by significant neutropenia, thrombocytopenia and fall which caused an ankle fracture. She was discharged form [facility] to [facility]. she left [facility] 1 weeks ago and is nearly independent with all IADL/ADls and is moving well. She received C5 fo R-miniCHOP which was overall well tolerated. She completed treatment on 9/9/21 and PET/CT demonstrated further response to treatment. \par \par #Diffuse Large B Cell Lymphoma EBV positive\par -PET/CT 5/10/21: Status post wedge resection of left upper lobe lung nodule, as compared to CT dated 4/13/2021. Additional FDG-avid, partially necrotic bilateral lung masses and mildly avid groundglass opacities are not significantly changed as compared to prior CT, compatible with biopsy-proven lymphoma. 2. Few mildly FDG-avid, mildly enlarged mediastinal and bilateral hilar lymph nodes are nonspecific. 3. Previously seen indeterminate 3.1 cm lesion in the upper pole of right kidney s FDG-avid. 4. FDG-avid left adrenal nodule is indeterminate. \par -MRI 5/17/21: Right upper pole renal lesion significantly decreased in size of uncertain etiology. A 1.3 cm left adrenal nodule remains indeterminate.\par -s/p C1 RCHOP with IT MTX on 5/19 and 5/20\par - Echo was done inpatient on 4/21/21 with an EF of 69%\par -[place] [facility] [place]\par -s/p C4 RCHOP on 7/22/21\par -received C5 of R-miniCHOP on 8/19/21 and C6 on 9/9/21\par -Interim PET/CT 6/25/21 shows no progression of disease and is responding to treatment. \par -EOT PET/CT done 9/27/21 shows Interval further decrease in size and FDG avidity of left upper lung nodule (Deauville 3) and resolution of mildly FDG avid right upper lobe nodule.\par -surveillance CT 12/2021: A left upper lobe lung nodule is stable to slightly decreased in size since 9/27/2021 PET/CT. Mediastinal lymph nodes are also similar. Groundglass and reticular opacities in the lower lobes bilaterally and left upper lobe of uncertain etiology, similar in appearance to prior CT dated 4/13/2021.\par A previously noted indeterminate right renal mass has decreased in size, currently measuring 0.6 cm. A previously noted left adrenal nodule has also decreased in size.\par New mild compression deformity of the superior endplate of L5. Unchanged compression deformity of L1.\par Moderate hiatal hernia with partially intrathoracic stomach.\par -CT scan 4/28/2022: no new areas of disease. Stable mediastinal lymphadenopathy.Bilateral patchy groundglass and reticular opacities which have overall decreased from prior CT.\par -Will plan for surveillance CT scan in the coming week\par -c/w port flushes (plan to keep port for 1 year from last treatment)\par -EBV level now undetectable, will check at each visit \par -mouth sores are resolved\par \par #PE/DVT\par - can d/c Boo (has been on AC for 1 year)\par \par #Right Kidney lesion\par - Pt was noted to have a hypodense lesion on right upper pole on CT done 4/13/21\par - consider MRI after interim PET/CT for further characterization, suspect this could be related to DLBCL\par \par #ANCA positive vasculitis \par -hold azathioprine\par -discussed case with Dr. Romano- rituximab should maintain her remission and will monitor symptoms after completion of therapy \par -f/u rheum\par -monitor for symptoms of recurrence (bone/back pain) \par \par RTO in 3 months ,sooner PRN\par

## 2022-07-25 NOTE — PHYSICAL EXAM
[Restricted in physically strenuous activity but ambulatory and able to carry out work of a light or sedentary nature] : Status 1- Restricted in physically strenuous activity but ambulatory and able to carry out work of a light or sedentary nature, e.g., light house work, office work [Normal] : affect appropriate [de-identified] : lesions on b/l LE - small, irregular shape, with erythema and scales - stable, she is following up with Derm

## 2022-07-25 NOTE — RESULTS/DATA
[FreeTextEntry1] : CBC 7/25/22: reviewed:\par wbc - 7.9\par hgb - 12.6\par plt - 227.0\par anc -4.13\par alc - 2.80\par \par Interim PET/CT 6/25/21: \par IMPRESSION: Compared to FDG-PET/CT scan dated 5/10/2021:\par \par 1. Mildly FDG-avid bilateral lung nodules are decreased in size and metabolism, compatible with a partial response to interval therapy (Deauville 4). Resolution of mildly FDG-avid bilateral groundglass pulmonary opacities.\par \par 2. Few minimally FDG-avid mediastinal and bilateral hilar lymph nodes are similar in size and decreased in metabolism.\par \par 3. Resolution of FDG activity associated with right upper pole renal lesion which is better delineated on prior contrast-enhanced CT and interval MRI.\par \par 4. Resolution of FDG-avid left adrenal nodule.\par \par 5. Resolution of increased FDG activity in anterior aspect of left 9th rib.\par \par 6. Resolution of linear focus of increased FDG activity in lateral aspect of left chest wall.\par \par 7. Complete opacification of left maxillary sinus with mild, peripheral FDG avidity, unchanged. Correlate clinically for acute sinusitis.\par \par \par \par \par \par \par \par \par \par KIRILL MIR MD; Attending Nuclear Medicine\par This document has been electronically signed. Jun 28 2021  3:35PM\par \par  \par \par \par Final Diagnosis\par 1. 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 Suggest correlation with clinical\par and therapeutic history for underlying immune deficiency; test\par for EBV viral load is recommended.\par \par Microscopic description: Sections show dense lymphoid infiltrate\par with necrosis; predominantly medium-large sized cells forming\par larger clusters and sheets. Angiocentric and angiodestructive\par pattern is noted; confirmed by elastin stain on block 2B.\par Immunohistochemical stains for CD3, CD20, CD79a, Pax5, ,\par Bcl-2, Bcl-6, Mum-1, CD5, CD10, CD23, CD30, CD43, cyclin D1,\par cMYC, p53, and Ki-67, AE1/AE3,  in situ hybridization for\par Renay-Barr virus-\par -encoded small RNA (VEENA), kappa and lambda\par light chains were performed on block 2B. Lymphoma cells are B\par cells, positive VEENA, CD20, CD79a, Pax5 (dim), Mum-1, Bcl2, CD43,\par variable CD30, negative CD5, CD10, CD23, cyclin D1, p53 (<5%),\par cMYC (<20%). Ki-67 proliferation index is high (>90%). CD3+ T\par cells comprise a minor population in the infiltrate.  stains\par few plasma cells; B cells are lambda-restricted.\par Cam 5.2, P40, TTF-1, synaptohysing, chromogranin, PAX-8 and MALU-\par 3 were performed on block 2C. They are negative in lymphoma\par cells.\par \par

## 2022-07-25 NOTE — HISTORY OF PRESENT ILLNESS
[de-identified] : Ms. Magallon is a 81 yo female with PMHx of ANCA vasculitis (dx 20 years ago, been on Azathioprine since then, being followed by Rheum outpt) who presents today for initial consultation for newly Diagnosed Diffuse Large B cell Lymphoma. Pt noticed earlier this year that she had more SOB with activity and fatigue. She did not have night sweats, fever/chills or weight loss with this fatigue.\par Pt presented tot he ER on 4/13/21 with chest pain lasting and exertional shortness of breath. Patient reported recent travel to [place] in car (~10 hrs ride). She had a CT angio done which showed right lower lobe and middle lobe pulmonary arterial emboli. She was noted to have B/l LE DVTs - she was started on heparin and switched to Eliquis. Pt need to have chest tube placement as well and was d/c on O2 therapy. Pt states she used O2 for the first few days at home but does not currently use it. O2 stats at home have been in the 90s\par In addition pt was found to have large b/l lung masses measuring up to 8.6cm. \par Pulm was consulted and pt underwent biopsy/VATs on 4/16/21, path showed  EBV+ diffuse large B-cell lymphoma. Lymphoma cells are B cells, positive VEENA, CD20, CD79a, Pax5 (dim), Mum-1, Bcl2, CD43,variable CD30, negative CD5, CD10, CD23, cyclin D1, p53 (<5%), cMYC (<20%). Ki-67 proliferation index is high (>90%). CD3+ T cells comprise a minor population in the infiltrate.  stains few plasma cells; B cells are lambda-restricted. Cam 5.2, P40, TTF-1, synaptophysin, chromogranin, PAX-8 and MALU-3 were performed on block 2C. They are negative in lymphoma cells.\par \par She received Cycle 1 of R-CHOP on 5/20/21 while admitted to monitor her respiratory function. She had a LP with IT MTX which did not demonstrate involvement with lymphoma.  She received C2-C3 only complicated by fatigue. After C4, she became neutropenic and profoundly fatigued. She has a fall at home prior to presenting to [facility] for IVF hydration. At that time, platelets were 10K. She was sent ot the ER and admitted for cytopenias and new ankle fracture. She was discharged to rehab at [facility]. She received C5 of mini-R-CHOP given her prior complications which was overall well tolerated and completed therapy on 9/9/21 (6 cycles). \par \par \par  [de-identified] : S/p  Cycle 6 R-mini CHOP on 9/9/21:\par 7/25/22: Patient reports feeling well overall.  No fevers/chills. No drenching night sweats. Patient reports her appetite is excellent. Weight stable.No abd pain. No N/V/D. No numbness/tingling. No bleeding/bruising.No CP. No palpitations. Her breathing has improved. No swelling. No bleeding/easy bruising. No dysuria.  \par

## 2022-09-30 NOTE — H&P ADULT - PROBLEM SELECTOR PLAN 1
Acute on chronic  Now associated with a nonproductive cough  Ddimer 2386  CTPA: increased ground glass opacities compared to prior and increased mediastinal lymphadenopathy, no PE  RVP ordered  hold off abx  Consider pulm consult in AM

## 2022-09-30 NOTE — H&P ADULT - NSHPLABSRESULTS_GEN_ALL_CORE
labs reviewed                        10.9   13.59 )-----------( 525      ( 30 Sep 2022 12:39 )             35.5       09-30    136  |  101  |  13  ----------------------------<  124<H>  4.1   |  23  |  0.82    Ca    8.8      30 Sep 2022 12:39  Mg     1.70     09-30    TPro  7.9  /  Alb  3.2<L>  /  TBili  0.7  /  DBili  x   /  AST  19  /  ALT  10  /  AlkPhos  73  09-30    PT/INR - ( 30 Sep 2022 12:39 )   PT: 15.8 sec;   INR: 1.36 ratio         PTT - ( 30 Sep 2022 12:39 )  PTT:31.6 sec    12:39 - VBG - pH: 7.40  | pCO2: 35    | pO2: 43    | Lactate: 2.4        Urinalysis Basic - ( 30 Sep 2022 16:43 )    Color: Yellow / Appearance: Slightly Turbid / SG: >1.030 / pH: x  Gluc: x / Ketone: Trace  / Bili: Small / Urobili: <2 mg/dL   Blood: x / Protein: 300 mg/dL / Nitrite: Negative   Leuk Esterase: Negative / RBC: 1 /HPF / WBC 2 /HPF   Sq Epi: x / Non Sq Epi: 2 /HPF / Bacteria: x    EKG interpreted by myself: NSR  CTPA interpreted by radiology: No pulmonary embolism.  New patchy groundglass opacities since 8/6/2022, likely infectious/inflammatory  Increased mediastinal lymphadenopathy.  Unchanged left upper lobe nodule since the prior study and decreased since more remote studies.

## 2022-09-30 NOTE — H&P ADULT - PROBLEM SELECTOR PLAN 3
New    hx on ANCA+ vasculitis- no current flair, UA negative, CXR no consolidation, RVP ordered  Monitor, consider Hemeonc consult in AM

## 2022-09-30 NOTE — ED PROVIDER NOTE - ATTENDING CONTRIBUTION TO CARE
Brief HPI:  80yo F pmhx of DLBCL (diffuse large B cell lymphoma) in remission, non-hodgkin's lymphoma, HLD, GERD, PE and DVT (4/2021) comes to ED w/ abnormal lab values.  Patient reports fatigue, cough, sob for 1-2 weeks.  Presented to pcp office and had labs drawn showing low hb (patient does not recall value).  Denies cp, nausea, vomiting, bloody or dark stool.      Vitals:   Reviewed    Exam:    GEN:  Non-toxic appearing, non-distressed, speaking full sentences, non-diaphoretic, AAOx3  HEENT:  NCAT, neck supple, EOMI, PERRLA, sclera anicteric, no conjunctival pallor or injection, no stridor, normal voice, no tonsillar exudate, uvula midline  CV:  regular rhythm and rate, s1/s2 audible, no murmurs, rubs or gallops, peripheral pulses 2+ and symmetric  PULM:  non-labored respirations, lungs clear to auscultation bilaterally, no wheezes, crackles or rales  ABD:  non distended, non-tender, no rebound, no guarding, negative Martínez's sign, bowel sounds normal, no cvat  MSK:  no gross deformity, non-tender extremities and joints, range of motion grossly normal appearing, no extremity edema, extremities warm and well perfused   NEURO:  AAOx3, CN II-XII intact, motor 5/5 in upper and lower extremities bilaterally, sensation grossly intact in extremities and trunk, finger to nose testing wnl, no nystagmus, negative Romberg, no pronator drift, no gait deficit  SKIN:  warm, dry, no rash or vesicles     A/P:  80yo F pmhx of DLBCL (diffuse large B cell lymphoma) in remission, non-hodgkin's lymphoma, HLD, GERD, PE and DVT (4/2021) comes to ED for anemia.  Also reports fatigue, sob, cough.  Possible viral uri, pna, vs. symptomatic anemia.  No e/o bleeding on exam.  Will send labs, cxr, supportive care.  Dispo pending. Brief HPI:  82yo F pmhx of DLBCL (diffuse large B cell lymphoma) in remission, non-hodgkin's lymphoma, HLD, GERD, PE and DVT (4/2021) comes to ED w/ abnormal lab values.  Patient reports fatigue, cough, sob for 1-2 weeks.  Presented to pcp office and had labs drawn showing low hb (patient does not recall value).  Denies cp, nausea, vomiting, bloody or dark stool.      Vitals:   Reviewed    Exam:    GEN:  Non-toxic appearing, non-distressed, speaking full sentences, non-diaphoretic, AAOx3  HEENT:  NCAT, neck supple, EOMI, PERRLA, sclera anicteric, no conjunctival pallor or injection, no stridor, normal voice, no tonsillar exudate, uvula midline  CV:  regular rhythm and rate, s1/s2 audible, no murmurs, rubs or gallops, peripheral pulses 2+ and symmetric  PULM:  non-labored respirations, lungs clear to auscultation bilaterally, no wheezes, crackles or rales  ABD:  non distended, non-tender, no rebound, no guarding, negative Martínez's sign, bowel sounds normal, no cvat  MSK:  no gross deformity, non-tender extremities and joints, range of motion grossly normal appearing, no extremity edema, extremities warm and well perfused   NEURO:  AAOx3, CN II-XII intact, motor 5/5 in upper and lower extremities bilaterally, sensation grossly intact in extremities and trunk, finger to nose testing wnl, no nystagmus, negative Romberg, no pronator drift, no gait deficit  SKIN:  warm, dry, no rash or vesicles     A/P:  82yo F pmhx of DLBCL (diffuse large B cell lymphoma) in remission, non-hodgkin's lymphoma, HLD, GERD, PE and DVT (4/2021) comes to ED for anemia.  Also reports fatigue, sob, cough.  Possible viral uri, pna, vs. symptomatic anemia.  No e/o bleeding on exam.  PE considered given history of malignancy, but low pre-test probability. Will send labs, cxr, supportive care.  Dispo pending.

## 2022-09-30 NOTE — ED PROVIDER NOTE - OBJECTIVE STATEMENT
82yo F pmhx of DLBCL (diffuse large B cell lymphoma) in remission, non-hodgkin's lymphoma, HLD, GERD, PE and DVT (4/2021) comes to ED w/ abnormal lab values. Patient recently placed on z-pack for presumed pneumonia however chest xray outpatient at that time was normal. Patient went to their PCP for fatigue, cough, mild sob and received labs indicating their hemoglobin was low and platelets were high. Their symptom is moderate, constant, non mediating. Started randomly. Reports symptoms of cough, fatigue, decreased po intake, loose stools (2 weeks, regular color, no blood). Denies chest pain, falls, trauma, n/v. Flu shot 9/14/22, Covid 2 vaccine series no booster.    Not on any blood thinners. Dr. Shaina Mejia PCP, Dr. Zonia Madrigal Oncologist (both [facility])

## 2022-09-30 NOTE — ED ADULT NURSE NOTE - NSIMPLEMENTINTERV_GEN_ALL_ED
Implemented All Fall Risk Interventions:  [place] to call system. Call bell, personal items and telephone within reach. Instruct patient to call for assistance. Room bathroom lighting operational. Non-slip footwear when patient is off stretcher. Physically safe environment: no spills, clutter or unnecessary equipment. Stretcher in lowest position, wheels locked, appropriate side rails in place. Provide visual cue, wrist band, yellow gown, etc. Monitor gait and stability. Monitor for mental status changes and reorient to person, place, and time. Review medications for side effects contributing to fall risk. Reinforce activity limits and safety measures with patient and family.

## 2022-09-30 NOTE — ED ADULT TRIAGE NOTE - CHIEF COMPLAINT QUOTE
c/o increased fatigue and over several days with cough, went to PCP was told hemoglobin is low (unknown value), denies chest pain, SOB hx of non hodgkin's lymphoma in remission

## 2022-09-30 NOTE — ED ADULT NURSE REASSESSMENT NOTE - NS ED NURSE REASSESS COMMENT FT1
Break coverage RN: pt A&Ox4, respirations are even and unlabored, sating at 97% on RA, denies CP, SOB, HA dizziness, and in no acute distress at this time.

## 2022-09-30 NOTE — ED PROVIDER NOTE - NS ED ROS FT
Constitutional: fatigue. no fevers, chills  HEENT: cough. no rhinorrhea  Cardiac: no chest pain, palpitations  Respiratory: SOB  GI: decreased po intake, diarrhea. no n/v, abd pain, bloody or dark stools  : no dysuria, frequency, or hematuria  MSK: no joint pain  Skin: no rashes  Neuro: no headache, change in vision, focal weakness  Psych: negative

## 2022-09-30 NOTE — H&P ADULT - HISTORY OF PRESENT ILLNESS
81 yr old female with a pmh of diffuse large B cell lymphoma in remission, non-hodgkin's lymphoma, depression, HLD, GERD, PE/DVT (4/2021 no longer on eliquis), presents with fatigue for the past 2 weeks. Reports this started after receiving her flu shot 9/14. She also reports a 1 week history of a nonproductive cough and slight increase in SOB. Pt also reports a 4 week hx of loose, brown, nonbloody stool with no associated abdominal pain.   9/28 she was placed on a  zpack by her PCP for a possible PNA even though the CXR was clear.   Per ED note pt presented for abnormal labs, per pt, she reports her PLT was high as outpt "but the numbers are better here".  Denies  headache, dizziness, chest pain, palpitations, abdominal pain, joint pain, urinary symptoms.   Vitals: T 98.8, , /75, RR 20 satting 97% RA

## 2022-09-30 NOTE — H&P ADULT - NSHPREVIEWOFSYSTEMS_GEN_ALL_CORE
REVIEW OF SYSTEMS:    CONSTITUTIONAL: No weakness, fevers or chills  EYES/ENT: No visual changes;  No dysphagia; No sore throat; No rhinorrhea; No sinus pain/pressure  NECK: No pain or stiffness  RESPIRATORY: +cough, no wheezing, hemoptysis; + shortness of breath  CARDIOVASCULAR: No chest pain or palpitations; No lower extremity edema  GASTROINTESTINAL: No abdominal or epigastric pain. No nausea, vomiting, or hematemesis; + loose stool. No melena or hematochezia.  GENITOURINARY: No dysuria, frequency or hematuria  NEUROLOGICAL: No numbness or weakness  MSK: ambulates without assistance   SKIN: No itching, burning, rashes, or lesions   All other review of systems is negative unless indicated above.

## 2022-09-30 NOTE — ED PROVIDER NOTE - CLINICAL SUMMARY MEDICAL DECISION MAKING FREE TEXT BOX
Impression: 80yo F pmhx of DLBCL (diffuse large B cell lymphoma), non-hodgkin's lymphoma, HLD, GERD, PE and DVT (4/2021) comes to ED w/ abnormal lab values. Their symptoms of fatigue, cough, mild sob, with benign exam findings are concerning for COVID, viral URI. PE considered however low risk due to current presentation lacking in evidence for DVT, tachycardia, recent surgery or immobilization, hemoptysis, or recent malignancy in the past 6 months.     Ordered labs, imaging, medications for diagnosis, management, and treatment.

## 2022-09-30 NOTE — ED PROVIDER NOTE - PHYSICAL EXAMINATION
General: NAD  HEENT: NCAT, PERRL  Cardiac: RRR, no murmurs, 2+ peripheral pulses  Chest: CTAB  Abdomen: soft, non-distended, bowel sounds present, no ttp, no rebound or guarding  Extremities: no peripheral edema, calf tenderness, or leg size discrepancies  Skin: no rashes  Neuro: AAOx4, 5+motor, sensory grossly intact  Psych: mood and affect appropriate

## 2022-09-30 NOTE — ED ADULT NURSE NOTE - OBJECTIVE STATEMENT
Pt bought in for increased weakness and exertional sob x few days.  Pt with no co fever or chills. Pt is NSR on cardiac monitor, denies any chest discomfort or nausea. iv placed blood collected. Pts granddaughter at bedside. Pt given call bell instructed to call for assistance if needed.

## 2022-10-01 NOTE — CONSULT NOTE ADULT - SUBJECTIVE AND OBJECTIVE BOX
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 Reports this started after receiving her flu shot 9/14. She also reports a 1 week history of a nonproductive cough and slight increase in SOB. Pt also reports a 4 week hx of loose, brown, nonbloody stool with no associated abdominal pain.  9/28 she was placed on a Z-pack by her PCP for a possible PNA, even though the CXR was clear.   Reportedly, she had higher platelets outpatient than here.    In terms of her Oncological history, she was originally diagnosed in 05/2021 with DLBCL, EBV+. She received 4 cycles R-CHOP c/b neutropenia/fatigue and so was switched to mini-CHOP for her last 2 cycles (total 6 cycles), completed 09/2021. Her post-treatment PET/CT showed improvement in disease. Her EBV PCRs has been negative for a year. Her most recent CT from 8/6/22 showed stable b/l GGOs and mediastinal/hilar LAD (2.4 x 1.8 cm at that time), along with persistently improving LUIS nodule (1.4 x 1.0 cm)    Allergies    codeine (Nausea)  penicillin (Hives)    Intolerances        MEDICATIONS  (STANDING):  citalopram 10 milliGRAM(s) Oral daily  enoxaparin Injectable 40 milliGRAM(s) SubCutaneous every 24 hours  folic acid 1 milliGRAM(s) Oral daily  pantoprazole    Tablet 40 milliGRAM(s) Oral before breakfast    MEDICATIONS  (PRN):  acetaminophen     Tablet .. 650 milliGRAM(s) Oral every 6 hours PRN Temp greater or equal to 38C (100.4F), Mild Pain (1 - 3)  aluminum hydroxide/magnesium hydroxide/simethicone Suspension 30 milliLiter(s) Oral every 4 hours PRN Dyspepsia  melatonin 3 milliGRAM(s) Oral at bedtime PRN Insomnia  ondansetron Injectable 4 milliGRAM(s) IV Push every 8 hours PRN Nausea and/or Vomiting      PAST MEDICAL & SURGICAL HISTORY:  ANCA-associated vasculitis      Anxiety and depression      Pulmonary embolism      DVT, lower extremity      GERD (gastroesophageal reflux disease)      Hyperlipidemia      Lung mass  s/p left wedge resection      DLBCL (diffuse large B cell lymphoma)      History of appendectomy          FAMILY HISTORY:  FH: lung cancer (Sibling)    FH: CAD (coronary artery disease) (Sibling)        SOCIAL HISTORY: No alcohol, tobacco, or drug use history    REVIEW OF SYSTEMS:  CONSTITUTIONAL: + fatigue. No fever  EYES/ENT: No visual changes; no throat pain  NECK: No pain or stiffness  RESPIRATORY: + SOB, cough  CARDIOVASCULAR: No chest pain or palpitations  GASTROINTESTINAL: No abdominal pain. No N/V/D/C  GENITOURINARY: No dysuria or hematuria  NEUROLOGICAL: No numbness or focal weakness  SKIN: No itching, burning, rashes, or lesions  Psych: No depression  MSK: no joint pain  Allergy: no urticaria        T(F): 98.4 (10-01-22 @ 10:00), Max: 98.8 (09-30-22 @ 19:00)  HR: 75 (10-01-22 @ 10:00)  BP: 134/54 (10-01-22 @ 10:00)  RR: 17 (10-01-22 @ 10:00)  SpO2: 93% (10-01-22 @ 10:00)  Wt(kg): --    GENERAL: NAD  HEENT: EOMI, MMM, no oropharyngeal lesions or erythema appreciated  Pulm: no increased WOB, CTAB/L  CV: RRR, S1, S2, no m/g/r  ABDOMEN: soft, NT, ND, no masses felt, no HSM  MSK: nl ROM  EXTREMITIES: no appreciable edema in b/l LE  Neuro: A&Ox3, no focal deficits  SKIN: warm and dry, no visible rash                          10.6   12.84 )-----------( 475      ( 01 Oct 2022 07:10 )             33.2       10-01    134<L>  |  102  |  12  ----------------------------<  118<H>  3.5   |  21<L>  |  0.76    Ca    8.8      01 Oct 2022 07:10  Mg     1.70     09-30    TPro  7.9  /  Alb  3.2<L>  /  TBili  0.7  /  DBili  x   /  AST  19  /  ALT  10  /  AlkPhos  73  09-30      Magnesium, Serum: 1.70 mg/dL (09-30 @ 12:39)       80 yo F with a PMH of ANCA vasculitis (reportedly not on meds), 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 Reports this started after receiving her flu shot 9/14. She also reports a 1 week history of a nonproductive cough and slight increase in SOB. Her SOB is mostly on exertion. Pt also reports a 4 week hx of loose, brown, nonbloody stool with no associated abdominal pain.  9/28 she was placed on a Z-pack by her PCP for a possible PNA, even though the CXR was clear.   Reportedly, she had higher platelets outpatient than here.    In terms of her Oncological history, she was originally diagnosed in 05/2021 with DLBCL, EBV+. She received 4 cycles R-CHOP c/b neutropenia/fatigue and so was switched to mini-CHOP for her last 2 cycles (total 6 cycles), completed 09/2021. Her post-treatment PET/CT showed improvement in disease. Her EBV PCRs has been negative for a year. Her most recent CT from 8/6/22 showed stable b/l GGOs and mediastinal/hilar LAD (2.4 x 1.8 cm at that time), along with persistently improving LUIS nodule (1.4 x 1.0 cm)    Allergies    codeine (Nausea)  penicillin (Hives)    Intolerances        MEDICATIONS  (STANDING):  citalopram 10 milliGRAM(s) Oral daily  enoxaparin Injectable 40 milliGRAM(s) SubCutaneous every 24 hours  folic acid 1 milliGRAM(s) Oral daily  pantoprazole    Tablet 40 milliGRAM(s) Oral before breakfast    MEDICATIONS  (PRN):  acetaminophen     Tablet .. 650 milliGRAM(s) Oral every 6 hours PRN Temp greater or equal to 38C (100.4F), Mild Pain (1 - 3)  aluminum hydroxide/magnesium hydroxide/simethicone Suspension 30 milliLiter(s) Oral every 4 hours PRN Dyspepsia  melatonin 3 milliGRAM(s) Oral at bedtime PRN Insomnia  ondansetron Injectable 4 milliGRAM(s) IV Push every 8 hours PRN Nausea and/or Vomiting      PAST MEDICAL & SURGICAL HISTORY:  ANCA-associated vasculitis      Anxiety and depression      Pulmonary embolism      DVT, lower extremity      GERD (gastroesophageal reflux disease)      Hyperlipidemia      Lung mass  s/p left wedge resection      DLBCL (diffuse large B cell lymphoma)      History of appendectomy          FAMILY HISTORY:  FH: lung cancer (Sibling)    FH: CAD (coronary artery disease) (Sibling)        SOCIAL HISTORY: No current alcohol, tobacco, or drug use. Former tobacco use, stopped at least 20-30 years ago.    REVIEW OF SYSTEMS:  CONSTITUTIONAL: + fatigue. No fever  EYES/ENT: No visual changes; no throat pain  NECK: No pain or stiffness  RESPIRATORY: + SOB, cough  CARDIOVASCULAR: No chest pain or palpitations  GASTROINTESTINAL: + loose stools. No abdominal pain. No N/V/C  GENITOURINARY: No dysuria or hematuria  NEUROLOGICAL: No numbness or focal weakness  SKIN: No itching, burning, rashes, or lesions  Psych: No depression  MSK: no joint pain  Allergy: no urticaria        T(F): 98.4 (10-01-22 @ 10:00), Max: 98.8 (09-30-22 @ 19:00)  HR: 75 (10-01-22 @ 10:00)  BP: 134/54 (10-01-22 @ 10:00)  RR: 17 (10-01-22 @ 10:00)  SpO2: 93% (10-01-22 @ 10:00)  Wt(kg): --    GENERAL: NAD  HEENT: EOMI, MM dry, no oropharyngeal lesions or erythema appreciated  Pulm: no increased WOB, CTAB/L  CV: RRR, S1, S2, no m/g/r  ABDOMEN: soft, NT, ND, no masses felt, no HSM  MSK: nl ROM  EXTREMITIES: no appreciable edema in b/l LE  Neuro: A&Ox3, no focal deficits  SKIN: warm and dry, no visible rash                          10.6   12.84 )-----------( 475      ( 01 Oct 2022 07:10 )             33.2       10-01    134<L>  |  102  |  12  ----------------------------<  118<H>  3.5   |  21<L>  |  0.76    Ca    8.8      01 Oct 2022 07:10  Mg     1.70     09-30    TPro  7.9  /  Alb  3.2<L>  /  TBili  0.7  /  DBili  x   /  AST  19  /  ALT  10  /  AlkPhos  73  09-30      Magnesium, Serum: 1.70 mg/dL (09-30 @ 12:39)       82 yo F with a PMH of ANCA vasculitis (reportedly not on meds), 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 Reports this started after receiving her flu shot 9/14. She also reports a 1 week history of a nonproductive cough and slight increase in SOB. Her SOB is mostly on exertion. Pt also reports a 4 week hx of loose, brown, nonbloody stool with no associated abdominal pain.  9/28 she was placed on a Z-pack by her PCP for a possible PNA, even though the CXR was clear.   Reportedly, she had higher platelets outpatient than here.    In terms of her Oncological history, she was originally diagnosed in 05/2021 with DLBCL, EBV+. She received 4 cycles R-CHOP c/b neutropenia/fatigue and so was switched to mini-CHOP for her last 2 cycles (total 6 cycles), completed 09/2021. Her post-treatment PET/CT showed improvement in disease. Her EBV PCRs has been negative for a year. Her most recent CT from 8/6/22 showed stable b/l GGOs and mediastinal/hilar LAD (2.4 x 1.8 cm at that time), along with persistently improving LUIS nodule (1.4 x 1.0 cm)    Allergies    codeine (Nausea)  penicillin (Hives)    Intolerances        MEDICATIONS  (STANDING):  citalopram 10 milliGRAM(s) Oral daily  enoxaparin Injectable 40 milliGRAM(s) SubCutaneous every 24 hours  folic acid 1 milliGRAM(s) Oral daily  pantoprazole    Tablet 40 milliGRAM(s) Oral before breakfast    MEDICATIONS  (PRN):  acetaminophen     Tablet .. 650 milliGRAM(s) Oral every 6 hours PRN Temp greater or equal to 38C (100.4F), Mild Pain (1 - 3)  aluminum hydroxide/magnesium hydroxide/simethicone Suspension 30 milliLiter(s) Oral every 4 hours PRN Dyspepsia  melatonin 3 milliGRAM(s) Oral at bedtime PRN Insomnia  ondansetron Injectable 4 milliGRAM(s) IV Push every 8 hours PRN Nausea and/or Vomiting      PAST MEDICAL & SURGICAL HISTORY:  ANCA-associated vasculitis      Anxiety and depression      Pulmonary embolism      DVT, lower extremity      GERD (gastroesophageal reflux disease)      Hyperlipidemia      Lung mass  s/p left wedge resection      DLBCL (diffuse large B cell lymphoma)      History of appendectomy          FAMILY HISTORY:  FH: lung cancer (Sibling)    FH: CAD (coronary artery disease) (Sibling)        SOCIAL HISTORY: No current alcohol, tobacco, or drug use. Former tobacco use, stopped at least 20-30 years ago.    REVIEW OF SYSTEMS:  CONSTITUTIONAL: + fatigue. No fever  EYES/ENT: No visual changes; no throat pain  NECK: No pain or stiffness  RESPIRATORY: + SOB, cough  CARDIOVASCULAR: No chest pain or palpitations  GASTROINTESTINAL: + loose stools. No abdominal pain. No N/V/C  GENITOURINARY: No dysuria or hematuria  NEUROLOGICAL: No numbness or focal weakness  SKIN: No itching, burning, rashes, or lesions  Psych: No depression  MSK: no joint pain  Allergy: no urticaria        T(F): 98.4 (10-01-22 @ 10:00), Max: 98.8 (09-30-22 @ 19:00)  HR: 75 (10-01-22 @ 10:00)  BP: 134/54 (10-01-22 @ 10:00)  RR: 17 (10-01-22 @ 10:00)  SpO2: 93% (10-01-22 @ 10:00)  Wt(kg): --    GENERAL: NAD  HEENT: EOMI, MM dry, no oropharyngeal lesions or erythema appreciated  Pulm: no increased WOB, CTAB/L  CV: RRR, S1, S2, no m/g/r  ABDOMEN: soft, NT, ND, no masses felt, no HSM  MSK: nl ROM  EXTREMITIES: no appreciable edema in b/l LE  Neuro: A&Ox3, no focal deficits  SKIN: warm and dry, no visible rash                          10.6   12.84 )-----------( 475      ( 01 Oct 2022 07:10 )             33.2       10-01    134<L>  |  102  |  12  ----------------------------<  118<H>  3.5   |  21<L>  |  0.76    Ca    8.8      01 Oct 2022 07:10  Mg     1.70     09-30    TPro  7.9  /  Alb  3.2<L>  /  TBili  0.7  /  DBili  x   /  AST  19  /  ALT  10  /  AlkPhos  73  09-30      Magnesium, Serum: 1.70 mg/dL (09-30 @ 12:39)

## 2022-10-01 NOTE — CONSULT NOTE ADULT - ASSESSMENT
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    #DLBCL  - Originally diagnosed 04/2021, EBV+, s/p wedge resection of L lung mass. Follows with Dr. Madrigal at [facility]  - S/p 4 cycles R-CHOP (IT MTX tx given with negative CSF flow) complicated by severe cytopenias/fatigue, then completed 2 more cycles of R-mini-CHOP (6 total cycles completed on 9/9/21)  - Post-treatment PET/CT showed Deauville 3 response with noted mediastinal lymph nodes and GGOs bilaterally. has been off treatment since  - Most recent CT 8/6/22 showed improvement of a prior LUIS mass and stable GGOs/LNs  - However, now with worsening GGOs and mediastinal LAD (i.e. 2 x 1.2 --> 2.4 x 1.8 cm), concerning for recurrence of her lymphoma  - Will therefore need a biopsy of LNs to eval for recurrence  - Recommend pulmonology consult to obtain biopsy  - Will also check EBV PCR, has been negative since May 2021      Aryles Hedjar, MD, PGY-5  Hematology/Oncology Fellow  [facility]  Pager: [phone]  After 5PM and on weekends and holidays, please call the inpatient fellow on call. 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    #DLBCL  - Originally diagnosed 04/2021, EBV+, s/p wedge resection of L lung mass. Follows with Dr. Madrigal at [facility]  - S/p 4 cycles R-CHOP (IT MTX tx given with negative CSF flow) complicated by severe cytopenias/fatigue, then completed 2 more cycles of R-mini-CHOP (6 total cycles completed on 9/9/21)  - Post-treatment PET/CT showed Deauville 3 response with noted mediastinal lymph nodes and GGOs bilaterally. has been off treatment since  - Most recent CT 8/6/22 showed improvement of a prior LUIS mass and stable GGOs/LNs  - However, now with worsening GGOs and mediastinal LAD (i.e. 2 x 1.2 --> 2.4 x 1.8 cm), concerning for recurrence of her lymphoma  - Stable residual LUIS mass (1.4 x 1 cm) compared to last CT  - Will therefore need a biopsy of LNs to eval for recurrence  - Recommend pulmonology consult to obtain biopsy  - Will also check EBV PCR, has been negative since May 2021      Aryles Hedjar, MD, PGY-5  Hematology/Oncology Fellow  [facility]  Pager: [phone]  After 5PM and on weekends and holidays, please call the inpatient fellow on call. 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    #DLBCL  - Originally diagnosed 04/2021, EBV+, s/p wedge resection of L lung mass. Follows with Dr. Madrigal at [facility]  - S/p 4 cycles R-CHOP (IT MTX tx given with negative CSF flow) complicated by severe cytopenias/fatigue, then completed 2 more cycles of R-mini-CHOP (6 total cycles completed on 9/9/21)  - Post-treatment PET/CT showed Deauville 3 response with noted mediastinal lymph nodes and GGOs bilaterally. Has been off treatment since  - Most recent CT 8/6/22 showed improvement of a prior LUIS mass and stable GGOs/LNs  - However, now with worsening GGOs and mediastinal LAD (i.e. 2 x 1.2 --> 2.4 x 1.8 cm), concerning for recurrence of her lymphoma  - Stable residual LUIS mass (1.4 x 1 cm) compared to last CT  - Will therefore need a biopsy of LNs to eval for recurrence  - Recommend pulmonology consult to obtain biopsy  - Will also check EBV PCR, has been negative since May 2021      Aryles Hedjar, MD, PGY-5  Hematology/Oncology Fellow  [facility]  Pager: [phone]  After 5PM and on weekends and holidays, please call the inpatient fellow on call.

## 2022-10-01 NOTE — PATIENT PROFILE ADULT - FALL HARM RISK - UNIVERSAL INTERVENTIONS
Bed in lowest position, wheels locked, appropriate side rails in place/Call bell, personal items and telephone in reach/Instruct patient to call for assistance before getting out of bed or chair/Non-slip footwear when patient is out of bed/Parks to call system/Physically safe environment - no spills, clutter or unnecessary equipment/Purposeful Proactive Rounding/Room/bathroom lighting operational, light cord in reach

## 2022-10-01 NOTE — CONSULT NOTE ADULT - ATTENDING COMMENTS
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  Patient received 4 cycles of RCHOP with IT MTX, along with 2 more cycles of R-miniCHOP due to fatigue and cytopenias. Completed 9/9/21. Patient had demonstrated a good response to mediastinal lymph nodes.  Now off treatment.  Most recent CT scan 8/6/22 patient continued to have improvement in the LUIS mass, stable ground glass opacities, and stable LN.    Most recently, Mediastinal LN starting to enlarge 2.4X1.8cm concerning for recurrence.  Patient now presents for worsening fatigue.      Recommend rechecking EBV status. Will need a bronchoscopy for biopsy of mediastinal LN to confirm recurrence before considering salvage therapy.

## 2022-10-01 NOTE — CONSULT NOTE ADULT - SUBJECTIVE AND OBJECTIVE BOX
10-01-22 @ 14:16    Patient is a 81y old  Female who presents with a chief complaint of SOB (01 Oct 2022 11:34)      HPI:  81 yr old female with a pmh of diffuse large B cell lymphoma in remission, non-hodgkin's lymphoma, depression, HLD, GERD, PE/DVT (4/2021 no longer on eliquis), presents with fatigue for the past 2 weeks. Reports this started after receiving her flu shot 9/14. She also reports a 1 week history of a nonproductive cough and slight increase in SOB. Pt also reports a 4 week hx of loose, brown, nonbloody stool with no associated abdominal pain.   9/28 she was placed on a  zpack by her PCP for a possible PNA even though the CXR was clear.   Per ED note pt presented for abnormal labs, per pt, she reports her PLT was high as outpt "but the numbers are better here".  Denies  headache, dizziness, chest pain, palpitations, abdominal pain, joint pain, urinary symptoms.   Vitals: T 98.8, , /75, RR 20 satting 97% RA (30 Sep 2022 22:50)   she says she is not sob; she roger room air; she has no  underlyingl marisa disease: never had covid too:  hx of pe : not on chemo now for last one year:  she has no cough or phlegm or fver at home; ;  she said her symptoms are that she was not feeling right:       ?FOLLOWING PRESENT  [ y] Hx of PE/DVT, [x ] Hx COPD, x[ ] Hx of Asthma, [ x] Hx of Hospitalization, [ x]  Hx of BiPAP/CPAP use, [ x] Hx of KATE    Allergies    codeine (Nausea)  penicillin (Hives)    Intolerances        PAST MEDICAL & SURGICAL HISTORY:  ANCA-associated vasculitis      Anxiety and depression      Pulmonary embolism      DVT, lower extremity      GERD (gastroesophageal reflux disease)      Hyperlipidemia      Lung mass  s/p left wedge resection      DLBCL (diffuse large B cell lymphoma)      History of appendectomy          FAMILY HISTORY:  FH: lung cancer (Sibling)    FH: CAD (coronary artery disease) (Sibling)        Social History: [x  ] TOBACCO                  [  x] ETOH                                 [ x ] IVDA/DRUGS    REVIEW OF SYSTEMS      General:	fatigue : weakness    Skin/Breast:x  	  Ophthalmologic:x  	  ENMT:	x    Respiratory and Thorax: minimal sob: no wheezing  	  Cardiovascular:	x    Gastrointestinal:	x    Genitourinary:	x    Musculoskeletal:	x    Neurological:	x    Psychiatric:	x    Hematology/Lymphatics:	x    Endocrine:	x    Allergic/Immunologic:	x    MEDICATIONS  (STANDING):  citalopram 10 milliGRAM(s) Oral daily  enoxaparin Injectable 40 milliGRAM(s) SubCutaneous every 24 hours  folic acid 1 milliGRAM(s) Oral daily  pantoprazole    Tablet 40 milliGRAM(s) Oral before breakfast    MEDICATIONS  (PRN):  acetaminophen     Tablet .. 650 milliGRAM(s) Oral every 6 hours PRN Temp greater or equal to 38C (100.4F), Mild Pain (1 - 3)  aluminum hydroxide/magnesium hydroxide/simethicone Suspension 30 milliLiter(s) Oral every 4 hours PRN Dyspepsia  melatonin 3 milliGRAM(s) Oral at bedtime PRN Insomnia  ondansetron Injectable 4 milliGRAM(s) IV Push every 8 hours PRN Nausea and/or Vomiting       Vital Signs Last 24 Hrs  T(C): 36.9 (01 Oct 2022 10:00), Max: 37.1 (30 Sep 2022 19:00)  T(F): 98.4 (01 Oct 2022 10:00), Max: 98.8 (30 Sep 2022 19:00)  HR: 75 (01 Oct 2022 10:00) (75 - 102)  BP: 134/54 (01 Oct 2022 10:00) (134/54 - 168/72)  BP(mean): --  RR: 17 (01 Oct 2022 10:00) (17 - 24)  SpO2: 93% (01 Oct 2022 10:00) (93% - 99%)    Parameters below as of 01 Oct 2022 10:00  Patient On (Oxygen Delivery Method): room air    Orthostatic VS          I&O's Summary    30 Sep 2022 07:01  -  01 Oct 2022 07:00  --------------------------------------------------------  IN: 450 mL / OUT: 0 mL / NET: 450 mL        Physical Exam:   GENERAL: NAD, well-groomed, well-developed  HEENT: RANJIT/   Atraumatic, Normocephalic  ENMT: No tonsillar erythema, exudates, or enlargement; Moist mucous membranes, Good dentition, No lesions  NECK: Supple, No JVD, Normal thyroid  CHEST/LUNG: Clear to auscultation bilaterally  CVS: Regular rate and rhythm; No murmurs, rubs, or gallops  GI: : Soft, Nontender, Nondistended; Bowel sounds present  NERVOUS SYSTEM:  Alert & Oriented X3  EXTREMITIES:  -edema  LYMPH: No lymphadenopathy noted  SKIN: No rashes or lesions  ENDOCRINOLOGY: No Thyromegaly  PSYCH: Appropriate    Labs:  -2.6<35<4>>43<<7.405>>-2.6<<3><<4><<5<<439>>                            10.6   12.84 )-----------( 475      ( 01 Oct 2022 07:10 )             33.2                         10.9   13.59 )-----------( 525      ( 30 Sep 2022 12:39 )             35.5     10-01    134<L>  |  102  |  12  ----------------------------<  118<H>  3.5   |  21<L>  |  0.76  09-30    136  |  101  |  13  ----------------------------<  124<H>  4.1   |  23  |  0.82    Ca    8.8      01 Oct 2022 07:10  Ca    8.8      30 Sep 2022 12:39  Mg     1.70     09-30    TPro  7.9  /  Alb  3.2<L>  /  TBili  0.7  /  DBili  x   /  AST  19  /  ALT  10  /  AlkPhos  73  09-30    CAPILLARY BLOOD GLUCOSE        LIVER FUNCTIONS - ( 30 Sep 2022 12:39 )  Alb: 3.2 g/dL / Pro: 7.9 g/dL / ALK PHOS: 73 U/L / ALT: 10 U/L / AST: 19 U/L / GGT: x           PT/INR - ( 30 Sep 2022 12:39 )   PT: 15.8 sec;   INR: 1.36 ratio         PTT - ( 30 Sep 2022 12:39 )  PTT:31.6 sec  Urinalysis Basic - ( 30 Sep 2022 16:43 )    Color: Yellow / Appearance: Slightly Turbid / SG: >1.030 / pH: x  Gluc: x / Ketone: Trace  / Bili: Small / Urobili: <2 mg/dL   Blood: x / Protein: 300 mg/dL / Nitrite: Negative   Leuk Esterase: Negative / RBC: 1 /HPF / WBC 2 /HPF   Sq Epi: x / Non Sq Epi: 2 /HPF / Bacteria: x      Culture - Urine (collected 30 Sep 2022 17:53)  Source: Clean Catch Clean Catch (Midstream)  Final Report (01 Oct 2022 14:13):    <10,000 CFU/mL Normal Urogenital [place]      D DImer  D-Dimer Assay, Quantitative: 2599 ng/mL DDU (09-30 @ 12:39)  Serum Pro-Brain Natriuretic Peptide: 1026 pg/mL (09-30 @ 12:39)      Studies  Chest X-RAY  CT SCAN Chest   CT Abdomen  Venous Dopplers: LE:   Others        rad< from: CT Angio Chest PE Protocol w/ IV Cont (09.30.22 @ 18:25) >  opacities, for example in the anterior right upper lobe on series 2 image   28 and right middle lobe peripherally. The 1.4 x 1.0 cm irregularleft   upper lobe nodule (2, 37) is unchanged since the prior study and   decreased since more remote studies. Right lower lobe bulla. No pleural   effusion.    UPPER ABDOMEN: No acute findings    BONES/SOFT TISSUES: Unchanged T11 and L1 compression deformities.    IMPRESSION:  No pulmonary embolism.    New patchy groundglass opacities since 8/6/2022, likely   infectious/inflammatory    Increased mediastinal lymphadenopathy.    Unchanged left upper lobe nodule since the prior study and decreased   since more remote studies.    --- End of Report ---    < end of copied text >  < from: Transthoracic Echocardiogram (04.21.21 @ 10:08) >  regurgitation.  Pericardium/PleuraNormal pericardium with no pericardial  effusion.  Hemodynamic: Estimated right ventricular systolic pressure  equals 40 mm Hg, assuming right atrial pressure equals 10  mm Hg, consistent with mild pulmonary hypertension.  ------------------------------------------------------------------------  CONCLUSIONS:  1. Mitral annular calcification, otherwise normal mitral  valve. Mild mitral regurgitation.  2. Calcified trileaflet aortic valve with normal opening.  Mild aortic regurgitation.  3. Normal left ventricular internal dimensions and wall  thicknesses.  4. Normal left ventricular systolic function. No segmental    < end of copied text >

## 2022-10-03 NOTE — PROGRESS NOTE ADULT - NSPROGADDITIONALINFOA_GEN_ALL_CORE
DW ACP : and pmd andpt in detaIl about the pros and cons of ebus biopsy she agrees: arranged for tomorrow at 2 Pm:  CELL: [phone]: DR FREEDMAN; ONC ; LEIGHA ABOUT EBUS BIOPSY DW ACP : and pmd and pt in detail about the pros and cons of ebus biopsy she agrees: arranged for tomorrow at 2 Pm:  CELL: [phone]: DR FREEDMAN; ONC ; DW ABOUT EBUS BIOPSY:    demi onc: : nga; [phone] ; dr pepe; hematologist; herp vt oncologist

## 2022-10-03 NOTE — DIETITIAN INITIAL EVALUATION ADULT - PERTINENT MEDS FT
MEDICATIONS  (STANDING):  cefTRIAXone   IVPB 1000 milliGRAM(s) IV Intermittent every 24 hours  cefTRIAXone   IVPB      citalopram 10 milliGRAM(s) Oral daily  fluticasone propionate 50 MICROgram(s)/spray Nasal Spray 1 Spray(s) Both Nostrils two times a day  folic acid 1 milliGRAM(s) Oral daily  pantoprazole    Tablet 40 milliGRAM(s) Oral before breakfast    MEDICATIONS  (PRN):  acetaminophen     Tablet .. 650 milliGRAM(s) Oral every 6 hours PRN Temp greater or equal to 38C (100.4F), Mild Pain (1 - 3)  aluminum hydroxide/magnesium hydroxide/simethicone Suspension 30 milliLiter(s) Oral every 4 hours PRN Dyspepsia  melatonin 3 milliGRAM(s) Oral at bedtime PRN Insomnia  ondansetron Injectable 4 milliGRAM(s) IV Push every 8 hours PRN Nausea and/or Vomiting

## 2022-10-03 NOTE — PHYSICAL THERAPY INITIAL EVALUATION ADULT - GENERAL OBSERVATIONS, REHAB EVAL
Pt received semisupine in bed, in NAD. Pt agreeable to participate in PT evaluation. Pt left semisupine in bed as found, all lines intact and RN aware.

## 2022-10-03 NOTE — DIETITIAN INITIAL EVALUATION ADULT - PERTINENT LABORATORY DATA
10-03    137  |  103  |  17  ----------------------------<  82  4.1   |  23  |  0.67    Ca    8.8      03 Oct 2022 06:25  Phos  3.3     10-03  Mg     2.10     10-03

## 2022-10-03 NOTE — DIETITIAN INITIAL EVALUATION ADULT - ORAL INTAKE PTA/DIET HISTORY
Patient seen for assessment, lethargic during encounter. Reports decreased appetite a few days PTA from "having no energy". Doesn't follow a diet at home.

## 2022-10-03 NOTE — DIETITIAN INITIAL EVALUATION ADULT - OTHER INFO
81 year old female with a PMH of diffuse large B cell lymphoma in remission, non-hodgkin's lymphoma, depression, HLD, GERD, PE/DVT presents with fatigue for the past 2 weeks per chart.    Patient reports poor appetite and has not much desire to eat. Amenable to nutritional supplements and reviewed food preferences. No GI distress or chewing/swallowing difficulties. Has no food allergies. Reports UBW is around 138 lbs. No weight noted in chart. Bed scale weight reading weight 138.6 lbs. (10/3) per observation. No edema or pressure injuries noted per RN flow sheet.    Nutrition education deferred at this time.

## 2022-10-03 NOTE — PHYSICAL THERAPY INITIAL EVALUATION ADULT - PERTINENT HX OF CURRENT PROBLEM, REHAB EVAL
Pt is a 81 year old female presenting Pt is a 81 year old female presenting abnormal lab values and SOB. CT chest, VA duplex negative for clots. Admitted for further management.

## 2022-10-03 NOTE — PHYSICAL THERAPY INITIAL EVALUATION ADULT - PATIENT PROFILE REVIEW, REHAB EVAL
PT evaluate and treat orders received: Ambulate as tolerated. Consult with DEEPA KNOX, pt may participate in PT evaluation./yes

## 2022-10-03 NOTE — PHYSICAL THERAPY INITIAL EVALUATION ADULT - ADDITIONAL COMMENTS
Pt currently living in a house, 5 exterior steps to enter and a flight of stairs within home. Pt will be moving to an apartment with elevator access at the end of the month. Prior to admission, pt ambulated independently, no assistive device.

## 2022-10-04 NOTE — DISCHARGE NOTE PROVIDER - NSDCFUSCHEDAPPT_GEN_ALL_CORE_FT
[facility]  [facility] [address]  Scheduled Appointment: 10/17/2022

## 2022-10-04 NOTE — PROGRESS NOTE ADULT - NSPROGADDITIONALINFOA_GEN_ALL_CORE
d/w pt and NP.  d/w pulm.    Roge Cooper will be covering for me starting 10/5/22. He can be reached at  if needed.     - Dr. MARY Arias ([facility])  - [phone]

## 2022-10-04 NOTE — PROGRESS NOTE ADULT - PROBLEM SELECTOR PLAN 5
Chronic stable  Continue citalopram

## 2022-10-04 NOTE — DISCHARGE NOTE PROVIDER - NSDCFUADDAPPT_GEN_ALL_CORE_FT
APPTS ARE READY TO BE MADE: [X] YES    Best Family or Patient Contact (if needed):  Patient: [phone]  Granddaughter Corinne Smith: [phone]    Additional Information about above appointments (if needed):    1: PNA follow up   2: EBUS biopsy results  3:     Other comments or requests:    APPTS ARE READY TO BE MADE: [X] YES    Best Family or Patient Contact (if needed):  Patient: [phone]  Granddaughter Corinne Smith: [phone]    Additional Information about above appointments (if needed):    1: PNA follow up   2: EBUS biopsy results  3:     Other comments or requests: Appointment with Pulmonologist Dr. Penn within 1-2 weeks

## 2022-10-04 NOTE — GOALS OF CARE CONVERSATION - ADVANCED CARE PLANNING - CONVERSATION DETAILS
Advance directives discussed.    Pt was introduced the philosophy of palliative care: quality vs. quantity of life.   Goals of care discussed. All questions answered.   Full Code for now.  Will revisit this discussion in the future per the pt.

## 2022-10-04 NOTE — CONSULT NOTE ADULT - ASSESSMENT
80 yo F PMH prior tobacco use, DLBCL in remission, non-hodgkin's lymphoma, depression, HLD, GERD, and prior PE/DVT (4/2021, not currently on AC) presenting for subacute fatigue and shortness of breath found to have patchy ground glass opacities and progression of mediastinal lymphadenopathy. Interventional pulmonary consulted for bronchoscopy and lymph node biopsy    - will plan for bronchoscopy with EBUS and lymph node biopsy today  - follow up bronch specimens  - okay to restart prophylactic AC stacy Bautista MD  PGY-6  PCCM Fellow  Pager [phone]

## 2022-10-04 NOTE — PROGRESS NOTE ADULT - NSPROGADDITIONALINFOA_GEN_ALL_CORE
DW ACP : and pmd and pt in detail about the pros and cons of ebus biopsy she agrees: arranged for tomorrow at 2 Pm:  CELL: [phone]: DR FREEDMAN; ONC ; DW ABOUT EBUS BIOPSY:    demi onc: : nga; [phone] ; dr pepe; hematologist; herp vt oncologist    10/4: as above

## 2022-10-04 NOTE — CONSULT NOTE ADULT - SUBJECTIVE AND OBJECTIVE BOX
CHIEF COMPLAINT: Dyspnea    HPI:  80 yo F PMH prior tobacco use, DLBCL in remission, non-hodgkin's lymphoma, depression, HLD, GERD, and prior PE/DVT (4/2021, not currently on AC) presenting for subacute fatigue and shortness of breath. Reports that symptoms began after flu vaccine in mid september. Also w/ symptoms of non-productive cough with worsening shortness of breath. Of note, for past month prior to admission also reporting loose brown stool without associated abdominal pain. Was initiated on antibiotics (zpack) by PCP starting 9/28 for possible pneumonia. Given progression of symptoms presented to ED.     Upon arrival noted to be Afebrile and hypertensive to 160s SBP with saturations of 97% on RA. Labs with leukocytosis to 13.6, D-dimer 2599, and proBNP of 1026. CTPE performed w/o evidence of PE though new patchy ground glass opacities and progression of lymphadenopathy compared to CT scan from August. Interventional pulmonary consulted for bronchoscopy and lymph node biopsy.     PAST MEDICAL & SURGICAL HISTORY:  ANCA-associated vasculitis      Anxiety and depression      Pulmonary embolism      DVT, lower extremity      GERD (gastroesophageal reflux disease)      Hyperlipidemia      Lung mass  s/p left wedge resection      DLBCL (diffuse large B cell lymphoma)      History of appendectomy          FAMILY HISTORY:  FH: lung cancer (Sibling)    FH: CAD (coronary artery disease) (Sibling)        SOCIAL HISTORY:  Smoking: [ ] Never Smoked [x] Former Smoker (__ packs x ___ years) [ ] Current Smoker  (__ packs x ___ years)  Substance Use: [ ] Never Used [ ] Used ____  EtOH Use:  Marital Status: [ ] Single [ ]  [ ]  [ ]   Sexual History:   Occupation:  Recent Travel:  Country of Birth:  Advance Directives:    Allergies    codeine (Nausea)  penicillin (Hives)    Intolerances        HOME MEDICATIONS:  Home Medications:  citalopram 10 mg oral tablet: 1 tab(s) orally once a day (30 Sep 2022 22:44)  ezetimibe 10 mg oral tablet: 1 tab(s) orally once a day (30 Sep 2022 22:44)  folic acid 1 mg oral tablet: 1 tab(s) orally once a day (30 Sep 2022 22:44)  omeprazole 20 mg oral delayed release capsule: 1 cap(s) orally once a day (30 Sep 2022 22:44)      REVIEW OF SYSTEMS:  Constitutional: [x] negative [ ] fevers [ ] chills [ ] weight loss [ ] weight gain  HEENT: [x] negative [ ] dry eyes [ ] eye irritation [ ] postnasal drip [ ] nasal congestion  CV: [x] negative  [ ] chest pain [ ] orthopnea [ ] palpitations [ ] murmur  Resp: [ ] negative [x] cough [x] shortness of breath [ ] dyspnea [ ] wheezing [ ] sputum [ ] hemoptysis  GI: [ ] negative [ ] nausea [ ] vomiting [x] diarrhea [ ] constipation [ ] abd pain [ ] dysphagia   : [x] negative [ ] dysuria [ ] nocturia [ ] hematuria [ ] increased urinary frequency  Musculoskeletal: [x] negative [ ] back pain [ ] myalgias [ ] arthralgias [ ] fracture  Skin: [x] negative [ ] rash [ ] itch  Neurological: [x] negative [ ] headache [ ] dizziness [ ] syncope [ ] weakness [ ] numbness  Psychiatric: [ ] negative [ ] anxiety [ ] depression  Endocrine: [ ] negative [ ] diabetes [ ] thyroid problem  Hematologic/Lymphatic: [ ] negative [ ] anemia [ ] bleeding problem  Allergic/Immunologic: [ ] negative [ ] itchy eyes [ ] nasal discharge [ ] hives [ ] angioedema  [x] All other systems negative  [ ] Unable to assess ROS because ________    OBJECTIVE:  ICU Vital Signs Last 24 Hrs  T(C): 36.8 (04 Oct 2022 11:51), Max: 37.1 (03 Oct 2022 20:57)  T(F): 98.2 (04 Oct 2022 11:51), Max: 98.8 (03 Oct 2022 20:57)  HR: 81 (04 Oct 2022 11:51) (80 - 92)  BP: 154/66 (04 Oct 2022 11:51) (119/94 - 156/73)  BP(mean): --  ABP: --  ABP(mean): --  RR: 17 (04 Oct 2022 11:51) (17 - 18)  SpO2: 98% (04 Oct 2022 11:51) (95% - 99%)    O2 Parameters below as of 04 Oct 2022 10:15  Patient On (Oxygen Delivery Method): room air              10-03 @ 07:01  -  10-04 @ 07:00  --------------------------------------------------------  IN: 480 mL / OUT: 0 mL / NET: 480 mL      CAPILLARY BLOOD GLUCOSE          PHYSICAL EXAM:  General: Female, NAD  HEENT: EOMI  Neck: Supple  Respiratory: No wheezes or increased WOB  Cardiovascular: RRR no mrg  Abdomen: Soft  Extremities: WWP, no edema  Skin: No rashes  Neurological: AOx3  Psychiatry: Normal affect    HOSPITAL MEDICATIONS:  Standing Meds:  cefTRIAXone   IVPB 1000 milliGRAM(s) IV Intermittent every 24 hours  cefTRIAXone   IVPB      citalopram 10 milliGRAM(s) Oral daily  fluticasone propionate 50 MICROgram(s)/spray Nasal Spray 1 Spray(s) Both Nostrils two times a day  folic acid 1 milliGRAM(s) Oral daily  lactated ringers. 1000 milliLiter(s) IV Continuous <Continuous>  pantoprazole    Tablet 40 milliGRAM(s) Oral before breakfast      PRN Meds:  acetaminophen     Tablet .. 650 milliGRAM(s) Oral every 6 hours PRN  aluminum hydroxide/magnesium hydroxide/simethicone Suspension 30 milliLiter(s) Oral every 4 hours PRN  melatonin 3 milliGRAM(s) Oral at bedtime PRN  ondansetron Injectable 4 milliGRAM(s) IV Push every 8 hours PRN      LABS:                        11.6   11.57 )-----------( 650      ( 04 Oct 2022 05:30 )             36.7     Hgb Trend: 11.6<--, 11.0<--, 11.0<--, 10.6<--, 10.9<--  10-04    137  |  101  |  16  ----------------------------<  83  4.2   |  24  |  0.73    Ca    8.5      04 Oct 2022 05:30  Phos  2.8     10-04  Mg     2.00     10-04    TPro  8.2  /  Alb  3.2<L>  /  TBili  0.4  /  DBili  x   /  AST  22  /  ALT  8   /  AlkPhos  75  10-04    Creatinine Trend: 0.73<--, 0.67<--, 0.68<--, 0.76<--, 0.82<--  PT/INR - ( 04 Oct 2022 05:30 )   PT: 13.9 sec;   INR: 1.20 ratio         PTT - ( 03 Oct 2022 06:25 )  PTT:32.6 sec          MICROBIOLOGY:       RADIOLOGY:  [ ] Reviewed and interpreted by me    PULMONARY FUNCTION TESTS:    EKG:

## 2022-10-04 NOTE — PROGRESS NOTE ADULT - PROBLEM SELECTOR PLAN 7
Chronic stable  Continue ezetimibe  Lipid panel

## 2022-10-04 NOTE — DISCHARGE NOTE PROVIDER - HOSPITAL COURSE
81 yr old female with a PMHx of diffuse large B cell lymphoma in remission, non-hodgkin's lymphoma, depression, HLD, GERD, PE/DVT (4/2021 no longer on Eliquis), presents with fatigue for the past 2 weeks. Reports this started after receiving her flu shot 9/14. She also reports a 1 week history of a nonproductive cough and slight increase in SOB. Pt also reports a 4 week hx of loose, brown, nonbloody stool with no associated abdominal pain. 9/28 she was placed on a  zpack by her PCP for a possible PNA even though the CXR was clear.       SOB (shortness of breath).   -Acute on chronic  Now associated with a nonproductive cough  D-dimer 2599  CTPA: increased ground glass opacities compared to prior and increased mediastinal lymphadenopathy, no PE  RVP neg  started empiric abx  pulm consult appreciated  heme/onc consulted in regard to possible recurrent of her lymphoma  pulm f/u: EBUS bx of mediastinal lymphadenopathy  10/4/22. f/u path.     Anemia.   -H/H 10.9/35.5, MCV 97.3 (2/2022 Hb: 13.5)  Anemia work-up ordered.    Elevated platelet count.   -   hx on ANCA+ vasculitis- no current flair, UA negative, CXR no consolidation, RVP neg      Elevated blood pressure reading without diagnosis of hypertension.   -/75  Not on any antihypertensives at home      Anxiety and depression.   -Chronic stable  Continue citalopram.    GERD (gastroesophageal reflux disease).   -Chronic stable  Continue omeprazole.     Hyperlipidemia.   -Chronic stable  Continue ezetimibe    Patient has a follow up appointment with Dr. Madrigal on 10/17 and with Dr. Penn on 10/21/2022 at 8:30am. Patient is aware of these appointments.   Per Dr. Penn, patient is clear to go, discharge with Ceftin 500mg BID for 3 days.       On 10/05/2022 this case was reviewed with Dr. Hicks, the patient is medically stable and optimized for discharge. All medications were reviewed and prescriptions were sent to mutually agreed upon pharmacy. The patient agrees to follow up with providers as recommended.

## 2022-10-04 NOTE — DISCHARGE NOTE PROVIDER - CARE PROVIDERS DIRECT ADDRESSES
[email or web address]

## 2022-10-04 NOTE — DISCHARGE NOTE PROVIDER - NSDCMRMEDTOKEN_GEN_ALL_CORE_FT
citalopram 10 mg oral tablet: 1 tab(s) orally once a day  ezetimibe 10 mg oral tablet: 1 tab(s) orally once a day  folic acid 1 mg oral tablet: 1 tab(s) orally once a day  omeprazole 20 mg oral delayed release capsule: 1 cap(s) orally once a day   cefuroxime 500 mg oral tablet: 1 tab(s) orally 2 times a day. Start on 10/6/2022  citalopram 10 mg oral tablet: 1 tab(s) orally once a day  ezetimibe 10 mg oral tablet: 1 tab(s) orally once a day  fluticasone 50 mcg/inh nasal spray: 1 spray(s) nasal 2 times a day  folic acid 1 mg oral tablet: 1 tab(s) orally once a day  omeprazole 20 mg oral delayed release capsule: 1 cap(s) orally once a day

## 2022-10-04 NOTE — BRIEF OPERATIVE NOTE - NSICDXBRIEFPROCEDURE_GEN_ALL_CORE_FT
PROCEDURES:  Bronchoscopy, with EBUS and 1 or 2 lymph node sampling 04-Oct-2022 13:55:36  Shaan Bautista

## 2022-10-04 NOTE — DISCHARGE NOTE PROVIDER - PROVIDER TOKENS
PROVIDER:[TOKEN:[[id number]]] PROVIDER:[TOKEN:[[id number]]],PROVIDER:[TOKEN:[[id number]]] PROVIDER:[TOKEN:[[id number]]],PROVIDER:[TOKEN:[[id number]],SCHEDULEDAPPT:[10/21/2022],SCHEDULEDAPPTTIME:[08:30 AM]]

## 2022-10-04 NOTE — PRE-OP CHECKLIST - ALLERGIES REVIEWED
ANTICOAGULATION FOLLOW-UP CLINIC VISIT    Patient Name:  Cayetano Lunsford  Date:  2/2/2017  Contact Type:  Face to Face    SUBJECTIVE:     Patient Findings     Positives Medication Changes, No Problem Findings    Comments Added inhaler to Spiriva- now as Respimal also. INR stays in range           OBJECTIVE    INR PROTIME   Date Value Ref Range Status   02/02/2017 2.1* 0.86 - 1.14 Final       ASSESSMENT / PLAN  INR assessment THER    Recheck INR In: 4 WEEKS    INR Location Clinic      Anticoagulation Summary as of 2/2/2017     INR goal 2.0-3.0   Selected INR 2.1 (2/2/2017)   Maintenance plan 7.5 mg (5 mg x 1.5) on Mon; 5 mg (5 mg x 1) all other days   Full instructions 7.5 mg on Mon; 5 mg all other days   Weekly total 37.5 mg   No change documented Lorin Wheat RN   Plan last modified Lorin Wheat RN (11/3/2016)   Next INR check 3/2/2017   Priority INR   Target end date     Indications   Long-term (current) use of anticoagulants [Z79.01] [Z79.01]  Atrial fibrillation  unspecified [I48.91]         Anticoagulation Episode Summary     INR check location     Preferred lab     Send INR reminders to [facility]    Comments       Anticoagulation Care Providers     Provider Role Specialty Phone number    Debbie Lewsi MD [facility] [phone]            See the Encounter Report to view Anticoagulation Flowsheet and Dosing Calendar (Go to Encounters tab in chart review, and find the Anticoagulation Therapy Visit)        Lorin Wheat RN                 
done

## 2022-10-04 NOTE — DISCHARGE NOTE PROVIDER - NSDCCPCAREPLAN_GEN_ALL_CORE_FT
PRINCIPAL DISCHARGE DIAGNOSIS  Diagnosis: Shortness of breath  Assessment and Plan of Treatment: You were admitted to the hospital for evaluation and managment of your shortness of breath, which was increased from your baseline. You received antibiotics to treat your pneumonia and you are being discharged on oral antibiotics to complete the course. You also had a bronchoscopy and lymph node biopsy to assess for possible reaccurance of your Lymphoma. You have a follow up appointment with Dr. Madrigal on 10/17 10:40am and with Dr. Penn on 10/21/2022 at 8:30am.         SECONDARY DISCHARGE DIAGNOSES  Diagnosis: Elevated platelet count  Assessment and Plan of Treatment: Your platelets were elevated while in the hospital. Please follow up with Dr. Madrigal at your appointment.    Diagnosis: Elevated blood pressure reading without diagnosis of hypertension  Assessment and Plan of Treatment: You had some elevated blood pressure readings while in the hospital. Follow up with your primary care provider for further evaluation and managment of your blood pressure.    Diagnosis: GERD (gastroesophageal reflux disease)  Assessment and Plan of Treatment: Avoid fatty, fried foods, acidic foods such as tomatoes, lime and chocolate. Continue to take your medications as prescribed.      Diagnosis: Anemia  Assessment and Plan of Treatment: Eat a well balanced diet high in iron. Follow up with your Hematologist/oncologist.      Diagnosis: Hyperlipidemia  Assessment and Plan of Treatment: Continue prescribed medications to control your cholesterol levels and a DASH (Low fat/salt) diet. Follow up with your primary care provider upon discharge for further management and monitoring of cholesterol levels.      Diagnosis: Anxiety and depression  Assessment and Plan of Treatment: Continue to take your medications as prescribed. Please follow up with your primary care provider.

## 2022-10-04 NOTE — DISCHARGE NOTE PROVIDER - CARE PROVIDER_API CALL
Shaina Mejia)  Internal Medicine  [address] [place] [address]  Phone: [phone]  Fax: [phone]  Follow Up Time:    Shaina Mejia)  Internal Medicine  [address] [place] [address]  Phone: [phone]  Fax: [phone]  Follow Up Time:     Philip Penn)  Critical Care Medicine; Internal Medicine; Pulmonary Disease  [address] [place] [address]  Phone: [phone]  Fax: [phone]  Follow Up Time:    Shaina Mejia)  Internal Medicine  [address] [place] [address]  Phone: [phone]  Fax: [phone]  Follow Up Time:     Philip Penn)  Critical Care Medicine; Internal Medicine; Pulmonary Disease  [address] [place] [address]  Phone: [phone]  Fax: [phone]  Scheduled Appointment: 10/21/2022 08:30 AM

## 2022-10-05 NOTE — PROGRESS NOTE ADULT - SUBJECTIVE AND OBJECTIVE BOX
Date of Service: 10-04-22 @ 14:12    Patient is a 81y old  Female who presents with a chief complaint of SOB (04 Oct 2022 13:46)      Any change in ROS: Doing ok: no sob: no cough:     MEDICATIONS  (STANDING):  cefTRIAXone   IVPB 1000 milliGRAM(s) IV Intermittent every 24 hours  cefTRIAXone   IVPB      citalopram 10 milliGRAM(s) Oral daily  fluticasone propionate 50 MICROgram(s)/spray Nasal Spray 1 Spray(s) Both Nostrils two times a day  folic acid 1 milliGRAM(s) Oral daily  lactated ringers. 1000 milliLiter(s) (75 mL/Hr) IV Continuous <Continuous>  pantoprazole    Tablet 40 milliGRAM(s) Oral before breakfast    MEDICATIONS  (PRN):  acetaminophen     Tablet .. 650 milliGRAM(s) Oral every 6 hours PRN Temp greater or equal to 38C (100.4F), Mild Pain (1 - 3)  aluminum hydroxide/magnesium hydroxide/simethicone Suspension 30 milliLiter(s) Oral every 4 hours PRN Dyspepsia  melatonin 3 milliGRAM(s) Oral at bedtime PRN Insomnia  ondansetron Injectable 4 milliGRAM(s) IV Push every 8 hours PRN Nausea and/or Vomiting    Vital Signs Last 24 Hrs  T(C): 37.4 (04 Oct 2022 14:00), Max: 37.4 (04 Oct 2022 14:00)  T(F): 99.3 (04 Oct 2022 14:00), Max: 99.3 (04 Oct 2022 14:00)  HR: 88 (04 Oct 2022 14:10) (80 - 92)  BP: 97/23 (04 Oct 2022 14:10) (97/23 - 158/78)  BP(mean): 38 (04 Oct 2022 14:10) (38 - 90)  RR: 17 (04 Oct 2022 14:10) (17 - 19)  SpO2: 100% (04 Oct 2022 14:10) (93% - 100%)    Parameters below as of 04 Oct 2022 14:00  Patient On (Oxygen Delivery Method): face tent  O2 Flow (L/min): 3      I&O's Summary    03 Oct 2022 07:01  -  04 Oct 2022 07:00  --------------------------------------------------------  IN: 480 mL / OUT: 0 mL / NET: 480 mL    04 Oct 2022 07:01  -  04 Oct 2022 14:12  --------------------------------------------------------  IN: 0 mL / OUT: 0 mL / NET: 0 mL          Physical Exam:   GENERAL: NAD, well-groomed, well-developed  HEENT: RANJIT/   Atraumatic, Normocephalic  ENMT: No tonsillar erythema, exudates, or enlargement; Moist mucous membranes, Good dentition, No lesions  NECK: Supple, No JVD, Normal thyroid  CHEST/LUNG: Clear to auscultaion  CVS: Regular rate and rhythm; No murmurs, rubs, or gallops  GI: : Soft, Nontender, Nondistended; Bowel sounds present  NERVOUS SYSTEM:  Alert & Oriented X3  EXTREMITIES:  2+ Peripheral Pulses, No clubbing, cyanosis, or edema  LYMPH: No lymphadenopathy noted  SKIN: No rashes or lesions  ENDOCRINOLOGY: No Thyromegaly  PSYCH: Appropriate    Labs:  22                            11.6   11.57 )-----------( 650      ( 04 Oct 2022 05:30 )             36.7                         11.0   10.68 )-----------( 548      ( 03 Oct 2022 06:25 )             35.6                         11.0   11.41 )-----------( 503      ( 02 Oct 2022 07:03 )             34.6                         10.6   12.84 )-----------( 475      ( 01 Oct 2022 07:10 )             33.2     10-04    137  |  101  |  16  ----------------------------<  83  4.2   |  24  |  0.73  10-03    137  |  103  |  17  ----------------------------<  82  4.1   |  23  |  0.67  10-02    138  |  104  |  15  ----------------------------<  91  3.9   |  23  |  0.68  10-01    134<L>  |  102  |  12  ----------------------------<  118<H>  3.5   |  21<L>  |  0.76    Ca    8.5      04 Oct 2022 05:30  Ca    8.8      03 Oct 2022 06:25  Phos  2.8     10-04  Phos  3.3     10-03  Mg     2.00     10-04  Mg     2.10     10-03    TPro  8.2  /  Alb  3.2<L>  /  TBili  0.4  /  DBili  x   /  AST  22  /  ALT  8   /  AlkPhos  75  10-04    CAPILLARY BLOOD GLUCOSE          LIVER FUNCTIONS - ( 04 Oct 2022 05:30 )  Alb: 3.2 g/dL / Pro: 8.2 g/dL / ALK PHOS: 75 U/L / ALT: 8 U/L / AST: 22 U/L / GGT: x           PT/INR - ( 04 Oct 2022 05:30 )   PT: 13.9 sec;   INR: 1.20 ratio         PTT - ( 03 Oct 2022 06:25 )  PTT:32.6 sec    D-Dimer Assay, Quantitative: 2599 ng/mL DDU (09-30 @ 12:39)        RECENT CULTURES:  09-30 @ 17:53 Clean Catch Clean Catch (Midstream)         rad< from: CT Angio Chest PE Protocol w/ IV Cont (09.30.22 @ 18:25) >  embolism.    MEDIASTINUM/LYMPH NODES: Increased mediastinal lymphadenopathy. A   reference low right paratracheal node measures 2.4 x 1.8 cm (2, 39),   interval increase in size from prior when it measured 2.0 x 1.2 cm.   Moderate-sized hiatal hernia.    HEART/VASCULATURE: Heart size normal. No pericardial effusion. Coronary   artery calcifications. Aortic calcifications. Left chest wall port with   tip terminating at the superior cavoatrial junction.    AIRWAYS/LUNGS/PLEURA: Left upper lobe wedge resection. The central   airways are patent. Since 8/6/2022, there are new patchy groundglass   opacities, for example in the anterior right upper lobe on series 2 image   28 and right middle lobe peripherally. The 1.4 x 1.0 cm irregularleft   upper lobe nodule (2, 37) is unchanged since the prior study and   decreased since more remote studies. Right lower lobe bulla. No pleural   effusion.    UPPER ABDOMEN: No acute findings    BONES/SOFT TISSUES: Unchanged T11 and L1 compression deformities.    IMPRESSION:  No pulmonary embolism.    New patchy groundglass opacities since 8/6/2022, likely   infectious/inflammatory    Increased mediastinal lymphadenopathy.    Unchanged left upper lobe nodule since the prior study and decreased   since more remote studies.    --- End of Report ---    < end of copied text >         <10,000 CFU/mL Normal Urogenital [place]          RESPIRATORY CULTURES:          Studies  Chest X-RAY  CT SCAN Chest   Venous Dopplers: LE:   CT Abdomen  Others              
SUBJECTIVE/ OVERNIGHT EVENTS:  have more energy today  cough stable  no cp, no sob, no n/v/d. no abdominal pain.  no headache, no dizziness.       --------------------------------------------------------------------------------------------  LABS:                        11.0   11.41 )-----------( 503      ( 02 Oct 2022 07:03 )             34.6     10-02    138  |  104  |  15  ----------------------------<  91  3.9   |  23  |  0.68    Ca    8.6      02 Oct 2022 07:03  Phos  3.7     10-02  Mg     1.90     10-02        CAPILLARY BLOOD GLUCOSE            Urinalysis Basic - ( 30 Sep 2022 16:43 )    Color: Yellow / Appearance: Slightly Turbid / SG: >1.030 / pH: x  Gluc: x / Ketone: Trace  / Bili: Small / Urobili: <2 mg/dL   Blood: x / Protein: 300 mg/dL / Nitrite: Negative   Leuk Esterase: Negative / RBC: 1 /HPF / WBC 2 /HPF   Sq Epi: x / Non Sq Epi: 2 /HPF / Bacteria: x        RADIOLOGY & ADDITIONAL TESTS:    Imaging Personally Reviewed:  [x] YES  [ ] NO    Consultant(s) Notes Reviewed:  [x] YES  [ ] NO    MEDICATIONS  (STANDING):  azithromycin  IVPB 500 milliGRAM(s) IV Intermittent every 24 hours  azithromycin  IVPB      cefTRIAXone   IVPB 1000 milliGRAM(s) IV Intermittent every 24 hours  cefTRIAXone   IVPB      citalopram 10 milliGRAM(s) Oral daily  enoxaparin Injectable 40 milliGRAM(s) SubCutaneous every 24 hours  fluticasone propionate 50 MICROgram(s)/spray Nasal Spray 1 Spray(s) Both Nostrils two times a day  folic acid 1 milliGRAM(s) Oral daily  pantoprazole    Tablet 40 milliGRAM(s) Oral before breakfast    MEDICATIONS  (PRN):  acetaminophen     Tablet .. 650 milliGRAM(s) Oral every 6 hours PRN Temp greater or equal to 38C (100.4F), Mild Pain (1 - 3)  aluminum hydroxide/magnesium hydroxide/simethicone Suspension 30 milliLiter(s) Oral every 4 hours PRN Dyspepsia  melatonin 3 milliGRAM(s) Oral at bedtime PRN Insomnia  ondansetron Injectable 4 milliGRAM(s) IV Push every 8 hours PRN Nausea and/or Vomiting      Care Discussed with Consultants/Other Providers [x] YES  [ ] NO    Vital Signs Last 24 Hrs  T(C): 36.8 (02 Oct 2022 11:51), Max: 36.8 (01 Oct 2022 20:47)  T(F): 98.3 (02 Oct 2022 11:51), Max: 98.3 (01 Oct 2022 20:47)  HR: 78 (02 Oct 2022 11:51) (77 - 84)  BP: 141/61 (02 Oct 2022 11:51) (141/61 - 173/74)  BP(mean): --  RR: 19 (02 Oct 2022 11:51) (18 - 21)  SpO2: 95% (02 Oct 2022 11:51) (94% - 99%)    Parameters below as of 02 Oct 2022 11:51  Patient On (Oxygen Delivery Method): room air      I&O's Summary    01 Oct 2022 07:01  -  02 Oct 2022 07:00  --------------------------------------------------------  IN: 850 mL / OUT: 0 mL / NET: 850 mL      PHYSICAL EXAM:  GENERAL: NAD, well-developed, comfortable  HEAD:  Atraumatic, Normocephalic  EYES: EOMI, PERRLA, conjunctiva and sclera clear  NECK: Supple, No JVD  CHEST/LUNG: mild decrease breath sounds bilaterally; No wheeze   HEART: Regular rate and rhythm; No murmurs, rubs, or gallops  ABDOMEN: Soft, Nontender, Nondistended; Bowel sounds present  Neuro: AAOx3, no focal weakness, 5/5 b/l extremity strength  EXTREMITIES:  2+ Peripheral Pulses, No clubbing, cyanosis, or edema  SKIN: No rashes or lesions     
Date of Service: 10-03-22 @ 13:08    Patient is a 81y old  Female who presents with a chief complaint of SOB (02 Oct 2022 13:44)      Any change in ROS:     MEDICATIONS  (STANDING):  cefTRIAXone   IVPB 1000 milliGRAM(s) IV Intermittent every 24 hours  cefTRIAXone   IVPB      citalopram 10 milliGRAM(s) Oral daily  enoxaparin Injectable 40 milliGRAM(s) SubCutaneous every 24 hours  fluticasone propionate 50 MICROgram(s)/spray Nasal Spray 1 Spray(s) Both Nostrils two times a day  folic acid 1 milliGRAM(s) Oral daily  ibuprofen  Tablet. 200 milliGRAM(s) Oral once  pantoprazole    Tablet 40 milliGRAM(s) Oral before breakfast    MEDICATIONS  (PRN):  acetaminophen     Tablet .. 650 milliGRAM(s) Oral every 6 hours PRN Temp greater or equal to 38C (100.4F), Mild Pain (1 - 3)  aluminum hydroxide/magnesium hydroxide/simethicone Suspension 30 milliLiter(s) Oral every 4 hours PRN Dyspepsia  melatonin 3 milliGRAM(s) Oral at bedtime PRN Insomnia  ondansetron Injectable 4 milliGRAM(s) IV Push every 8 hours PRN Nausea and/or Vomiting    Vital Signs Last 24 Hrs  T(C): 36.6 (03 Oct 2022 09:05), Max: 36.9 (02 Oct 2022 20:44)  T(F): 97.8 (03 Oct 2022 09:05), Max: 98.4 (02 Oct 2022 20:44)  HR: 88 (03 Oct 2022 09:05) (62 - 88)  BP: 145/78 (03 Oct 2022 09:05) (121/75 - 168/70)  BP(mean): --  RR: 18 (03 Oct 2022 09:05) (18 - 18)  SpO2: 100% (03 Oct 2022 09:05) (96% - 100%)    Parameters below as of 03 Oct 2022 09:05  Patient On (Oxygen Delivery Method): room air        I&O's Summary    02 Oct 2022 07:01  -  03 Oct 2022 07:00  --------------------------------------------------------  IN: 0 mL / OUT: 0 mL / NET: 0 mL          Physical Exam:   GENERAL: NAD, well-groomed, well-developed  HEENT: RANJIT/   Atraumatic, Normocephalic  ENMT: No tonsillar erythema, exudates, or enlargement; Moist mucous membranes, Good dentition, No lesions  NECK: Supple, No JVD, Normal thyroid  CHEST/LUNG: Clear to auscultaion, ; No rales, rhonchi, wheezing, or rubs  CVS: Regular rate and rhythm; No murmurs, rubs, or gallops  GI: : Soft, Nontender, Nondistended; Bowel sounds present  NERVOUS SYSTEM:  Alert & Oriented X3, Good concentration; Motor Strength 5/5 B/L upper and lower extremities; DTRs 2+ intact and symmetric  EXTREMITIES:  2+ Peripheral Pulses, No clubbing, cyanosis, or edema  LYMPH: No lymphadenopathy noted  SKIN: No rashes or lesions  ENDOCRINOLOGY: No Thyromegaly  PSYCH: Appropriate    Labs:  22                            11.0   10.68 )-----------( 548      ( 03 Oct 2022 06:25 )             35.6                         11.0   11.41 )-----------( 503      ( 02 Oct 2022 07:03 )             34.6                         10.6   12.84 )-----------( 475      ( 01 Oct 2022 07:10 )             33.2                         10.9   13.59 )-----------( 525      ( 30 Sep 2022 12:39 )             35.5     10-03    137  |  103  |  17  ----------------------------<  82  4.1   |  23  |  0.67  10-02    138  |  104  |  15  ----------------------------<  91  3.9   |  23  |  0.68  10-01    134<L>  |  102  |  12  ----------------------------<  118<H>  3.5   |  21<L>  |  0.76  09-30    136  |  101  |  13  ----------------------------<  124<H>  4.1   |  23  |  0.82    Ca    8.8      03 Oct 2022 06:25  Ca    8.6      02 Oct 2022 07:03  Phos  3.3     10-03  Phos  3.7     10-02  Mg     2.10     10-03  Mg     1.90     10-02    TPro  7.9  /  Alb  3.2<L>  /  TBili  0.7  /  DBili  x   /  AST  19  /  ALT  10  /  AlkPhos  73  09-30    CAPILLARY BLOOD GLUCOSE            PT/INR - ( 03 Oct 2022 06:25 )   PT: 15.2 sec;   INR: 1.31 ratio         PTT - ( 03 Oct 2022 06:25 )  PTT:32.6 sec    D-Dimer Assay, Quantitative: 2599 ng/mL DDU (09-30 @ 12:39)        RECENT CULTURES:  09-30 @ 17:53 Clean Catch Clean Catch (Midstream)     rad< from: CT Angio Chest PE Protocol w/ IV Cont (09.30.22 @ 18:25) >  diffuse large B-cell lymphoma. History of ANCA vasculitis    COMPARISON: CT chest abdomen pelvis 8/6/2022    CONTRAST/COMPLICATIONS:  IV Contrast: Omnipaque 350  60 cc administered  Oral Contrast: NONE  Complications: None reported at time of study completion    PROCEDURE:  CT Angiogram of the chest was obtained with intravenous contrast. Three   dimensional maximum intensity projection (MIP) images were generated.    FINDINGS:    PULMONARY ANGIOGRAM: There is adequate filling of the pulmonary arterial   tree. There is no main, lobar, segmental or subsegmentalpulmonary   embolism.    MEDIASTINUM/LYMPH NODES: Increased mediastinal lymphadenopathy. A   reference low right paratracheal node measures 2.4 x 1.8 cm (2, 39),   interval increase in size from prior when it measured 2.0 x 1.2 cm.   Moderate-sized hiatal hernia.    HEART/VASCULATURE: Heart size normal. No pericardial effusion. Coronary   artery calcifications. Aortic calcifications. Left chest wall port with   tip terminating at the superior cavoatrial junction.    AIRWAYS/LUNGS/PLEURA: Left upper lobe wedge resection. The central   airways are patent. Since 8/6/2022, there are new patchy groundglass   opacities, for example in the anterior right upper lobe on series 2 image   28 and right middle lobe peripherally. The 1.4 x 1.0 cm irregularleft   upper lobe nodule (2, 37) is unchanged since the prior study and   decreased since more remote studies. Right lower lobe bulla. No pleural   effusion.    UPPER ABDOMEN: No acute findings    BONES/SOFT TISSUES: Unchanged T11 and L1 compression deformities.    IMPRESSION:  No pulmonary embolism.    New patchy groundglass opacities since 8/6/2022, likely   infectious/inflammatory    Increased mediastinal lymphadenopathy.    Unchanged left upper lobe nodule since the prior study and decreased   since more remote studies.    --- End of Report ---    < end of copied text >             <10,000 CFU/mL Normal Urogenital [place]          RESPIRATORY CULTURES:          Studies  Chest X-RAY  CT SCAN Chest   Venous Dopplers: LE:   CT Abdomen  Others        < from: Transthoracic Echocardiogram (04.21.21 @ 10:08) >  OBSERVATIONS:  Mitral Valve: Mitral annular calcification, otherwise  normal mitral valve. Mild mitral regurgitation.  Aortic Root: Normal aortic root.  Aortic Valve: Calcified trileaflet aortic valve with normal  opening. Mild aortic regurgitation.  Left Atrium: Normal left atrium.  LA volume index = 23  cc/m2.  Left Ventricle: Normal left ventricular systolic function.  No segmental wall motion abnormalities. Normal left  ventricular internal dimensions and wallthicknesses. Mild  diastolic dysfunction (Stage I).  Right Heart: Normal right atrium. Normal right ventricular  size and function. Normal tricuspid valve.   Mild tricuspid  regurgitation. Normal pulmonic valve. Minimal pulmonic  regurgitation.  Pericardium/PleuraNormal pericardium with no pericardial  effusion.  Hemodynamic: Estimated right ventricular systolic pressure  equals 40 mm Hg, assuming right atrial pressure equals 10  mm Hg, consistent with mild pulmonary hypertension.  ------------------------------------------------------------------------  CONCLUSIONS:  1. Mitral annular calcification, otherwise normal mitral  valve. Mild mitral regurgitation.  2. Calcified trileaflet aortic valve with normal opening.  Mild aortic regurgitation.  3. Normal left ventricular internal dimensions and wall  thicknesses.  4. Normal left ventricular systolic function. No segmental  wall motion abnormalities.  5. Mild diastolic dysfunction (Stage I).  6. Normal right ventricular size and function.  7. Estimated right ventricular systolic pressure equals 40  mm Hg, assuming right atrial pressure equals 10 mm Hg,  consistent with mild pulmonary hypertension.  ------------------------------------------------------------------------    < end of copied text >        
Date of Service: 10-02-22 @ 13:45    Patient is a 81y old  Female who presents with a chief complaint of SOB (02 Oct 2022 12:46)      Any change in ROS: she is feeling better:  her nose drip is better:      MEDICATIONS  (STANDING):  azithromycin  IVPB 500 milliGRAM(s) IV Intermittent every 24 hours  azithromycin  IVPB      cefTRIAXone   IVPB 1000 milliGRAM(s) IV Intermittent every 24 hours  cefTRIAXone   IVPB      citalopram 10 milliGRAM(s) Oral daily  enoxaparin Injectable 40 milliGRAM(s) SubCutaneous every 24 hours  fluticasone propionate 50 MICROgram(s)/spray Nasal Spray 1 Spray(s) Both Nostrils two times a day  folic acid 1 milliGRAM(s) Oral daily  pantoprazole    Tablet 40 milliGRAM(s) Oral before breakfast    MEDICATIONS  (PRN):  acetaminophen     Tablet .. 650 milliGRAM(s) Oral every 6 hours PRN Temp greater or equal to 38C (100.4F), Mild Pain (1 - 3)  aluminum hydroxide/magnesium hydroxide/simethicone Suspension 30 milliLiter(s) Oral every 4 hours PRN Dyspepsia  melatonin 3 milliGRAM(s) Oral at bedtime PRN Insomnia  ondansetron Injectable 4 milliGRAM(s) IV Push every 8 hours PRN Nausea and/or Vomiting    Vital Signs Last 24 Hrs  T(C): 36.8 (02 Oct 2022 11:51), Max: 36.8 (01 Oct 2022 20:47)  T(F): 98.3 (02 Oct 2022 11:51), Max: 98.3 (01 Oct 2022 20:47)  HR: 78 (02 Oct 2022 11:51) (77 - 84)  BP: 141/61 (02 Oct 2022 11:51) (141/61 - 173/74)  BP(mean): --  RR: 19 (02 Oct 2022 11:51) (18 - 21)  SpO2: 95% (02 Oct 2022 11:51) (94% - 99%)    Parameters below as of 02 Oct 2022 11:51  Patient On (Oxygen Delivery Method): room air        I&O's Summary    01 Oct 2022 07:01  -  02 Oct 2022 07:00  --------------------------------------------------------  IN: 850 mL / OUT: 0 mL / NET: 850 mL          Physical Exam:   GENERAL: NAD, well-groomed, well-developed  HEENT: RANJIT/   Atraumatic, Normocephalic  ENMT: No tonsillar erythema, exudates, or enlargement; Moist mucous membranes, Good dentition, No lesions  NECK: Supple, No JVD, Normal thyroid  CHEST/LUNG: Clear to auscultaion, ; No rales, rhonchi, wheezing, or rubs  CVS: Regular rate and rhythm; No murmurs, rubs, or gallops  GI: : Soft, Nontender, Nondistended; Bowel sounds present  NERVOUS SYSTEM:  Alert & Oriented X3  EXTREMITIES:  2+ Peripheral Pulses, No clubbing, cyanosis, or edema  LYMPH: No lymphadenopathy noted  SKIN: No rashes or lesions  ENDOCRINOLOGY: No Thyromegaly  PSYCH: Appropriate    Labs:  22                            11.0   11.41 )-----------( 503      ( 02 Oct 2022 07:03 )             34.6                         10.6   12.84 )-----------( 475      ( 01 Oct 2022 07:10 )             33.2                         10.9   13.59 )-----------( 525      ( 30 Sep 2022 12:39 )             35.5     10-02    138  |  104  |  15  ----------------------------<  91  3.9   |  23  |  0.68  10-01    134<L>  |  102  |  12  ----------------------------<  118<H>  3.5   |  21<L>  |  0.76  09-30    136  |  101  |  13  ----------------------------<  124<H>  4.1   |  23  |  0.82    Ca    8.6      02 Oct 2022 07:03  Ca    8.8      01 Oct 2022 07:10  Phos  3.7     10-02  Mg     1.90     10-02    TPro  7.9  /  Alb  3.2<L>  /  TBili  0.7  /  DBili  x   /  AST  19  /  ALT  10  /  AlkPhos  73  09-30    CAPILLARY BLOOD GLUCOSE              Urinalysis Basic - ( 30 Sep 2022 16:43 )    Color: Yellow / Appearance: Slightly Turbid / SG: >1.030 / pH: x  Gluc: x / Ketone: Trace  / Bili: Small / Urobili: <2 mg/dL   Blood: x / Protein: 300 mg/dL / Nitrite: Negative   Leuk Esterase: Negative / RBC: 1 /HPF / WBC 2 /HPF   Sq Epi: x / Non Sq Epi: 2 /HPF / Bacteria: x      D-Dimer Assay, Quantitative: 2599 ng/mL DDU (09-30 @ 12:39)  Serum Pro-Brain Natriuretic Peptide: 1026 pg/mL (09-30 @ 12:39)        RECENT CULTURES:  09-30 @ 17:53 Clean Catch Clean Catch (Midstream)         rad< from: CT Angio Chest PE Protocol w/ IV Cont (09.30.22 @ 18:25) >  Moderate-sized hiatal hernia.    HEART/VASCULATURE: Heart size normal. No pericardial effusion. Coronary   artery calcifications. Aortic calcifications. Left chest wall port with   tip terminating at the superior cavoatrial junction.    AIRWAYS/LUNGS/PLEURA: Left upper lobe wedge resection. The central   airways are patent. Since 8/6/2022, there are new patchy groundglass   opacities, for example in the anterior right upper lobe on series 2 image   28 and right middle lobe peripherally. The 1.4 x 1.0 cm irregularleft   upper lobe nodule (2, 37) is unchanged since the prior study and   decreased since more remote studies. Right lower lobe bulla. No pleural   effusion.    UPPER ABDOMEN: No acute findings    BONES/SOFT TISSUES: Unchanged T11 and L1 compression deformities.    IMPRESSION:  No pulmonary embolism.    New patchy groundglass opacities since 8/6/2022, likely   infectious/inflammatory    Increased mediastinal lymphadenopathy.    Unchanged left upper lobe nodule since the prior study and decreased   since more remote studies.    --- End of Report ---    < end of copied text >         <10,000 CFU/mL Normal Urogenital [place]          RESPIRATORY CULTURES:          Studies  Chest X-RAY  CT SCAN Chest   Venous Dopplers: LE:   CT Abdomen  Others              
Date of Service: 10-05-22 @ 14:15    Patient is a 81y old  Female who presents with a chief complaint of SOB (04 Oct 2022 15:41)      Any change in ROS:  doing ok: no sob: no cough  no phlegm ::  sp ebus     MEDICATIONS  (STANDING):  cefTRIAXone   IVPB 1000 milliGRAM(s) IV Intermittent every 24 hours  cefTRIAXone   IVPB      citalopram 10 milliGRAM(s) Oral daily  enoxaparin Injectable 40 milliGRAM(s) SubCutaneous every 24 hours  fluticasone propionate 50 MICROgram(s)/spray Nasal Spray 1 Spray(s) Both Nostrils two times a day  folic acid 1 milliGRAM(s) Oral daily  pantoprazole    Tablet 40 milliGRAM(s) Oral before breakfast    MEDICATIONS  (PRN):  acetaminophen     Tablet .. 650 milliGRAM(s) Oral every 6 hours PRN Temp greater or equal to 38C (100.4F), Mild Pain (1 - 3)  aluminum hydroxide/magnesium hydroxide/simethicone Suspension 30 milliLiter(s) Oral every 4 hours PRN Dyspepsia  melatonin 3 milliGRAM(s) Oral at bedtime PRN Insomnia  ondansetron Injectable 4 milliGRAM(s) IV Push every 8 hours PRN Nausea and/or Vomiting    Vital Signs Last 24 Hrs  T(C): 36.9 (05 Oct 2022 10:39), Max: 37.1 (04 Oct 2022 21:52)  T(F): 98.4 (05 Oct 2022 10:39), Max: 98.8 (04 Oct 2022 21:52)  HR: 80 (05 Oct 2022 10:39) (69 - 97)  BP: 125/51 (05 Oct 2022 10:39) (106/46 - 159/74)  BP(mean): 67 (04 Oct 2022 16:00) (57 - 78)  RR: 18 (05 Oct 2022 10:39) (15 - 20)  SpO2: 95% (05 Oct 2022 10:39) (95% - 100%)    Parameters below as of 05 Oct 2022 10:39  Patient On (Oxygen Delivery Method): room air        I&O's Summary    04 Oct 2022 07:01  -  05 Oct 2022 07:00  --------------------------------------------------------  IN: 525 mL / OUT: 0 mL / NET: 525 mL          Physical Exam:   GENERAL: NAD, well-groomed, well-developed  HEENT: RANJIT/   Atraumatic, Normocephalic  ENMT: No tonsillar erythema, exudates, or enlargement; Moist mucous membranes, Good dentition, No lesions  NECK: Supple, No JVD, Normal thyroid  CHEST/LUNG: Clear to auscultaion, ; No rales, rhonchi, wheezing, or rubs  CVS: Regular rate and rhythm; No murmurs, rubs, or gallops  GI: : Soft, Nontender, Nondistended; Bowel sounds present  NERVOUS SYSTEM:  Alert & Oriented X3  EXTREMITIES:  2+ Peripheral Pulses, No clubbing, cyanosis, or edema  LYMPH: No lymphadenopathy noted  SKIN: No rashes or lesions  ENDOCRINOLOGY: No Thyromegaly  PSYCH: Appropriate    Labs:  22                            11.0   10.07 )-----------( 576      ( 05 Oct 2022 07:55 )             35.1                         11.6   11.57 )-----------( 650      ( 04 Oct 2022 05:30 )             36.7                         11.0   10.68 )-----------( 548      ( 03 Oct 2022 06:25 )             35.6                         11.0   11.41 )-----------( 503      ( 02 Oct 2022 07:03 )             34.6     10-05    134<L>  |  101  |  17  ----------------------------<  119<H>  4.4   |  23  |  0.66  10-04    137  |  101  |  16  ----------------------------<  83  4.2   |  24  |  0.73  10-03    137  |  103  |  17  ----------------------------<  82  4.1   |  23  |  0.67  10-02    138  |  104  |  15  ----------------------------<  91  3.9   |  23  |  0.68    Ca    9.1      05 Oct 2022 07:55  Ca    8.5      04 Oct 2022 05:30  Phos  2.7     10-05  Phos  2.8     10-04  Mg     2.20     10-05  Mg     2.00     10-04    TPro  8.2  /  Alb  3.2<L>  /  TBili  0.4  /  DBili  x   /  AST  22  /  ALT  8   /  AlkPhos  75  10-04    CAPILLARY BLOOD GLUCOSE          LIVER FUNCTIONS - ( 04 Oct 2022 05:30 )  Alb: 3.2 g/dL / Pro: 8.2 g/dL / ALK PHOS: 75 U/L / ALT: 8 U/L / AST: 22 U/L / GGT: x           PT/INR - ( 04 Oct 2022 05:30 )   PT: 13.9 sec;   INR: 1.20 ratio         rad< from: CT Angio Chest PE Protocol w/ IV Cont (09.30.22 @ 18:25) >    UPPER ABDOMEN: No acute findings    BONES/SOFT TISSUES: Unchanged T11 and L1 compression deformities.    IMPRESSION:  No pulmonary embolism.    New patchy groundglass opacities since 8/6/2022, likely   infectious/inflammatory    Increased mediastinal lymphadenopathy.    Unchanged left upper lobe nodule since the prior study and decreased   since more remote studies.    --- End of Report ---    < end of copied text >      D-Dimer Assay, Quantitative: 2599 ng/mL DDU (09-30 @ 12:39)        RECENT CULTURES:  09-30 @ 17:53 Clean Catch Clean Catch (Midstream)                <10,000 CFU/mL Normal Urogenital [place]          RESPIRATORY CULTURES:          Studies  Chest X-RAY  CT SCAN Chest   Venous Dopplers: LE:   CT Abdomen  Others              
SUBJECTIVE/ OVERNIGHT EVENTS:  No overnight events.  No complaints.  still feels weak, fatigue.   No cp, sob, abdominal pain.  No n/v/d. no HA/dizziness.       --------------------------------------------------------------------------------------------  LABS:                        10.6   12.84 )-----------( 475      ( 01 Oct 2022 07:10 )             33.2     10-01    134<L>  |  102  |  12  ----------------------------<  118<H>  3.5   |  21<L>  |  0.76    Ca    8.8      01 Oct 2022 07:10  Mg     1.70     09-30    TPro  7.9  /  Alb  3.2<L>  /  TBili  0.7  /  DBili  x   /  AST  19  /  ALT  10  /  AlkPhos  73  09-30    PT/INR - ( 30 Sep 2022 12:39 )   PT: 15.8 sec;   INR: 1.36 ratio         PTT - ( 30 Sep 2022 12:39 )  PTT:31.6 sec  CAPILLARY BLOOD GLUCOSE            Urinalysis Basic - ( 30 Sep 2022 16:43 )    Color: Yellow / Appearance: Slightly Turbid / SG: >1.030 / pH: x  Gluc: x / Ketone: Trace  / Bili: Small / Urobili: <2 mg/dL   Blood: x / Protein: 300 mg/dL / Nitrite: Negative   Leuk Esterase: Negative / RBC: 1 /HPF / WBC 2 /HPF   Sq Epi: x / Non Sq Epi: 2 /HPF / Bacteria: x        RADIOLOGY & ADDITIONAL TESTS:    Imaging Personally Reviewed:  [x] YES  [ ] NO    Consultant(s) Notes Reviewed:  [x] YES  [ ] NO    MEDICATIONS  (STANDING):  azithromycin  IVPB      cefTRIAXone   IVPB      citalopram 10 milliGRAM(s) Oral daily  enoxaparin Injectable 40 milliGRAM(s) SubCutaneous every 24 hours  fluticasone propionate 50 MICROgram(s)/spray Nasal Spray 1 Spray(s) Both Nostrils two times a day  folic acid 1 milliGRAM(s) Oral daily  ibuprofen  Tablet. 400 milliGRAM(s) Oral once  pantoprazole    Tablet 40 milliGRAM(s) Oral before breakfast    MEDICATIONS  (PRN):  acetaminophen     Tablet .. 650 milliGRAM(s) Oral every 6 hours PRN Temp greater or equal to 38C (100.4F), Mild Pain (1 - 3)  aluminum hydroxide/magnesium hydroxide/simethicone Suspension 30 milliLiter(s) Oral every 4 hours PRN Dyspepsia  melatonin 3 milliGRAM(s) Oral at bedtime PRN Insomnia  ondansetron Injectable 4 milliGRAM(s) IV Push every 8 hours PRN Nausea and/or Vomiting      Care Discussed with Consultants/Other Providers [x] YES  [ ] NO    Vital Signs Last 24 Hrs  T(C): 36.8 (01 Oct 2022 20:47), Max: 37 (30 Sep 2022 23:34)  T(F): 98.3 (01 Oct 2022 20:47), Max: 98.6 (30 Sep 2022 23:34)  HR: 84 (01 Oct 2022 20:47) (75 - 100)  BP: 150/58 (01 Oct 2022 20:47) (134/54 - 168/72)  BP(mean): --  RR: 18 (01 Oct 2022 20:47) (17 - 18)  SpO2: 98% (01 Oct 2022 20:47) (93% - 99%)    Parameters below as of 01 Oct 2022 20:47  Patient On (Oxygen Delivery Method): room air      I&O's Summary    30 Sep 2022 07:01  -  01 Oct 2022 07:00  --------------------------------------------------------  IN: 450 mL / OUT: 0 mL / NET: 450 mL    01 Oct 2022 07:01  -  01 Oct 2022 21:23  --------------------------------------------------------  IN: 300 mL / OUT: 0 mL / NET: 300 mL      PHYSICAL EXAM:  GENERAL: NAD, well-developed, comfortable  HEAD:  Atraumatic, Normocephalic  EYES: EOMI, PERRLA, conjunctiva and sclera clear  NECK: Supple, No JVD  CHEST/LUNG: mild decrease breath sounds bilaterally; No wheeze   HEART: Regular rate and rhythm; No murmurs, rubs, or gallops  ABDOMEN: Soft, Nontender, Nondistended; Bowel sounds present  Neuro: AAOx3, no focal weakness, 5/5 b/l extremity strength  EXTREMITIES:  2+ Peripheral Pulses, No clubbing, cyanosis, or edema  SKIN: No rashes or lesions     
SUBJECTIVE/ OVERNIGHT EVENTS:  seen and examined this am  no cp, no sob, no n/v/d. no abdominal pain.  no headache, no dizziness.   states able to ambulate without issues  stable on abx.      --------------------------------------------------------------------------------------------  LABS:                        11.0   10.68 )-----------( 548      ( 03 Oct 2022 06:25 )             35.6     10-03    137  |  103  |  17  ----------------------------<  82  4.1   |  23  |  0.67    Ca    8.8      03 Oct 2022 06:25  Phos  3.3     10-03  Mg     2.10     10-03      PT/INR - ( 03 Oct 2022 06:25 )   PT: 15.2 sec;   INR: 1.31 ratio         PTT - ( 03 Oct 2022 06:25 )  PTT:32.6 sec  CAPILLARY BLOOD GLUCOSE                RADIOLOGY & ADDITIONAL TESTS:    Imaging Personally Reviewed:  [x] YES  [ ] NO    Consultant(s) Notes Reviewed:  [x] YES  [ ] NO    MEDICATIONS  (STANDING):  cefTRIAXone   IVPB 1000 milliGRAM(s) IV Intermittent every 24 hours  cefTRIAXone   IVPB      citalopram 10 milliGRAM(s) Oral daily  enoxaparin Injectable 40 milliGRAM(s) SubCutaneous every 24 hours  fluticasone propionate 50 MICROgram(s)/spray Nasal Spray 1 Spray(s) Both Nostrils two times a day  folic acid 1 milliGRAM(s) Oral daily  ibuprofen  Tablet. 200 milliGRAM(s) Oral once  pantoprazole    Tablet 40 milliGRAM(s) Oral before breakfast    MEDICATIONS  (PRN):  acetaminophen     Tablet .. 650 milliGRAM(s) Oral every 6 hours PRN Temp greater or equal to 38C (100.4F), Mild Pain (1 - 3)  aluminum hydroxide/magnesium hydroxide/simethicone Suspension 30 milliLiter(s) Oral every 4 hours PRN Dyspepsia  melatonin 3 milliGRAM(s) Oral at bedtime PRN Insomnia  ondansetron Injectable 4 milliGRAM(s) IV Push every 8 hours PRN Nausea and/or Vomiting      Care Discussed with Consultants/Other Providers [x] YES  [ ] NO    Vital Signs Last 24 Hrs  T(C): 36.6 (03 Oct 2022 09:05), Max: 36.9 (02 Oct 2022 20:44)  T(F): 97.8 (03 Oct 2022 09:05), Max: 98.4 (02 Oct 2022 20:44)  HR: 88 (03 Oct 2022 09:05) (62 - 88)  BP: 145/78 (03 Oct 2022 09:05) (121/75 - 168/70)  BP(mean): --  RR: 18 (03 Oct 2022 09:05) (18 - 18)  SpO2: 100% (03 Oct 2022 09:05) (96% - 100%)    Parameters below as of 03 Oct 2022 09:05  Patient On (Oxygen Delivery Method): room air      I&O's Summary    02 Oct 2022 07:01  -  03 Oct 2022 07:00  --------------------------------------------------------  IN: 0 mL / OUT: 0 mL / NET: 0 mL        PHYSICAL EXAM:  GENERAL: NAD, well-developed, comfortable  HEAD:  Atraumatic, Normocephalic  EYES: EOMI, PERRLA, conjunctiva and sclera clear  NECK: Supple, No JVD  CHEST/LUNG: mild decrease breath sounds bilaterally; No wheeze   HEART: Regular rate and rhythm; No murmurs, rubs, or gallops  ABDOMEN: Soft, Nontender, Nondistended; Bowel sounds present  Neuro: AAOx3, no focal weakness, 5/5 b/l extremity strength  EXTREMITIES:  2+ Peripheral Pulses, No clubbing, cyanosis, or edema  SKIN: No rashes or lesions     
SUBJECTIVE/ OVERNIGHT EVENTS:  bronch bx today  no cp, no sob, no n/v/d. no abdominal pain.  no headache, no dizziness.   stable vitals       --------------------------------------------------------------------------------------------  LABS:                        11.6   11.57 )-----------( 650      ( 04 Oct 2022 05:30 )             36.7     10-04    137  |  101  |  16  ----------------------------<  83  4.2   |  24  |  0.73    Ca    8.5      04 Oct 2022 05:30  Phos  2.8     10-04  Mg     2.00     10-04    TPro  8.2  /  Alb  3.2<L>  /  TBili  0.4  /  DBili  x   /  AST  22  /  ALT  8   /  AlkPhos  75  10-04    PT/INR - ( 04 Oct 2022 05:30 )   PT: 13.9 sec;   INR: 1.20 ratio         PTT - ( 03 Oct 2022 06:25 )  PTT:32.6 sec  CAPILLARY BLOOD GLUCOSE                RADIOLOGY & ADDITIONAL TESTS:    Imaging Personally Reviewed:  [x] YES  [ ] NO    Consultant(s) Notes Reviewed:  [x] YES  [ ] NO    MEDICATIONS  (STANDING):  cefTRIAXone   IVPB 1000 milliGRAM(s) IV Intermittent every 24 hours  cefTRIAXone   IVPB      citalopram 10 milliGRAM(s) Oral daily  enoxaparin Injectable 40 milliGRAM(s) SubCutaneous every 24 hours  fluticasone propionate 50 MICROgram(s)/spray Nasal Spray 1 Spray(s) Both Nostrils two times a day  folic acid 1 milliGRAM(s) Oral daily  pantoprazole    Tablet 40 milliGRAM(s) Oral before breakfast    MEDICATIONS  (PRN):  acetaminophen     Tablet .. 650 milliGRAM(s) Oral every 6 hours PRN Temp greater or equal to 38C (100.4F), Mild Pain (1 - 3)  aluminum hydroxide/magnesium hydroxide/simethicone Suspension 30 milliLiter(s) Oral every 4 hours PRN Dyspepsia  melatonin 3 milliGRAM(s) Oral at bedtime PRN Insomnia  ondansetron Injectable 4 milliGRAM(s) IV Push every 8 hours PRN Nausea and/or Vomiting      Care Discussed with Consultants/Other Providers [x] YES  [ ] NO    Vital Signs Last 24 Hrs  T(C): 36.5 (04 Oct 2022 15:00), Max: 37.4 (04 Oct 2022 14:00)  T(F): 97.7 (04 Oct 2022 15:00), Max: 99.3 (04 Oct 2022 14:00)  HR: 83 (04 Oct 2022 16:00) (80 - 97)  BP: 123/49 (04 Oct 2022 16:00) (97/23 - 158/78)  BP(mean): 67 (04 Oct 2022 16:00) (38 - 90)  RR: 20 (04 Oct 2022 16:00) (15 - 20)  SpO2: 100% (04 Oct 2022 16:00) (93% - 100%)    Parameters below as of 04 Oct 2022 15:30  Patient On (Oxygen Delivery Method): room air      I&O's Summary    03 Oct 2022 07:01  -  04 Oct 2022 07:00  --------------------------------------------------------  IN: 480 mL / OUT: 0 mL / NET: 480 mL    04 Oct 2022 07:01  -  04 Oct 2022 17:02  --------------------------------------------------------  IN: 225 mL / OUT: 0 mL / NET: 225 mL        PHYSICAL EXAM:  GENERAL: NAD, well-developed, comfortable  HEAD:  Atraumatic, Normocephalic  EYES: EOMI, PERRLA, conjunctiva and sclera clear  NECK: Supple, No JVD  CHEST/LUNG: mild decrease breath sounds bilaterally; No wheeze   HEART: Regular rate and rhythm; No murmurs, rubs, or gallops  ABDOMEN: Soft, Nontender, Nondistended; Bowel sounds present  Neuro: AAOx3, no focal weakness, 5/5 b/l extremity strength  EXTREMITIES:  2+ Peripheral Pulses, No clubbing, cyanosis, or edema  SKIN: No rashes or lesions

## 2022-10-05 NOTE — PROGRESS NOTE ADULT - PROBLEM SELECTOR PLAN 2
per hemoc
H/H 10.9/35.5, MCV 97.3 (2/2022 Hb: 13.5)  Anemia work-up ordered
per hemoc
H/H 10.9/35.5, MCV 97.3 (2/2022 Hb: 13.5)  Anemia work-up ordered

## 2022-10-05 NOTE — PROGRESS NOTE ADULT - PROBLEM SELECTOR PLAN 3
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
defer to hemonc
  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
defer to hemonc
  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
defer to hemonc
defer to hemonc
  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

## 2022-10-05 NOTE — PROGRESS NOTE ADULT - ASSESSMENT
81 yr old female with a PMHx of diffuse large B cell lymphoma in remission, non-hodgkin's lymphoma, depression, HLD, GERD, PE/DVT (4/2021 no longer on Eliquis), presents with fatigue for the past 2 weeks. Reports this started after receiving her flu shot 9/14. She also reports a 1 week history of a nonproductive cough and slight increase in SOB. Pt also reports a 4 week hx of loose, brown, nonbloody stool with no associated abdominal pain. 9/28 she was placed on a  zpack by her PCP for a possible PNA even though the CXR was clear.   
81 yr old female with a PMHx of diffuse large B cell lymphoma in remission, non-hodgkin's lymphoma, depression, HLD, GERD, PE/DVT (4/2021 no longer on Eliquis), presents with fatigue for the past 2 weeks. Reports this started after receiving her flu shot 9/14. She also reports a 1 week history of a nonproductive cough and slight increase in SOB. Pt also reports a 4 week hx of loose, brown, nonbloody stool with no associated abdominal pain. 9/28 she was placed on a  zpack by her PCP for a possible PNA even though the CXR was clear.   
81 yr old female presenting for SOB with a nonproductive cough and abnormal lab results 
81 yr old female with a PMHx of diffuse large B cell lymphoma in remission, non-hodgkin's lymphoma, depression, HLD, GERD, PE/DVT (4/2021 no longer on Eliquis), presents with fatigue for the past 2 weeks. Reports this started after receiving her flu shot 9/14. She also reports a 1 week history of a nonproductive cough and slight increase in SOB. Pt also reports a 4 week hx of loose, brown, nonbloody stool with no associated abdominal pain. 9/28 she was placed on a  zpack by her PCP for a possible PNA even though the CXR was clear.   
81 yr old female with a PMHx of diffuse large B cell lymphoma in remission, non-hodgkin's lymphoma, depression, HLD, GERD, PE/DVT (4/2021 no longer on Eliquis), presents with fatigue for the past 2 weeks. Reports this started after receiving her flu shot 9/14. She also reports a 1 week history of a nonproductive cough and slight increase in SOB. Pt also reports a 4 week hx of loose, brown, nonbloody stool with no associated abdominal pain. 9/28 she was placed on a  zpack by her PCP for a possible PNA even though the CXR was clear.

## 2022-10-05 NOTE — PROGRESS NOTE ADULT - PROBLEM SELECTOR PLAN 1
better: legionella is negative: dc Zithromax: cont ceftriaxone  Increasing mediastinal ln concerning for recurrence: will arrange for ebus biopsy next week  rpt inr pt ptt in am    10/03: she is doing ok : no sob; : for ebus biopsy tomorrow : keep npo after midnight  10/4: seems OK: for ebus today : worry about recurrence of lymphoma    10/5: she is doingok: on rooma ir:  I have advised her to follow with me in the office for the results:  demi santoyo
better: legionella is negative: dc Zithromax: cont ceftriaxone  Increasing mediastinal ln concerning for recurrence: will arrange for ebus biopsy next week  rpt inr pt ptt in am    10/03: she is doing ok : no sob; : for ebus biopsy tomorrow : keep npo after midnight
Acute on chronic  Now associated with a nonproductive cough  D-dimer 2599  CTPA: increased ground glass opacities compared to prior and increased mediastinal lymphadenopathy, no PE  RVP neg  started empiric abx  pulm consult appreciated  heme/onc consulted in regard to possible recurrent of her lymphoma  pulm f/u: EBUS bx of mediastinal lymphadenopathy  10/4/22. f/u path
Acute on chronic  Now associated with a nonproductive cough  Ddimer 2594  CTPA: increased ground glass opacities compared to prior and increased mediastinal lymphadenopathy, no PE  RVP neg  start empiric abx  pulm consult appreciated  heme/onc consulted in regard to possible recurrent of her lymphoma
Acute on chronic  Now associated with a nonproductive cough  D-dimer 2599  CTPA: increased ground glass opacities compared to prior and increased mediastinal lymphadenopathy, no PE  RVP neg  started empiric abx  pulm consult appreciated  heme/onc consulted in regard to possible recurrent of her lymphoma  pulm f/u: EBUS bx of mediastinal lymphadenopathy?
Acute on chronic  Now associated with a nonproductive cough  Ddimer 2595  CTPA: increased ground glass opacities compared to prior and increased mediastinal lymphadenopathy, no PE  RVP neg  started empiric abx  pulm consult appreciated  heme/onc consulted in regard to possible recurrent of her lymphoma
better: legionella is negative: dc Zithromax: cont ceftriaxone  Increasing mediastinal ln concerning for recurrence: will arrange for ebus biopsy next week  rpt inr pt ptt donta
better: legionella is negative: dc Zithromax: cont ceftriaxone  Increasing mediastinal ln concerning for recurrence: will arrange for ebus biopsy next week  rpt inr pt ptt in am    10/03: she is doing ok : no sob; : for ebus biopsy tomorrow : keep npo after midnight  10/4: seems OK: for ebus today : worry about recurrence of lymphoma

## 2022-10-05 NOTE — PROGRESS NOTE ADULT - PROBLEM SELECTOR PROBLEM 5
Anxiety and depression

## 2022-10-05 NOTE — PROGRESS NOTE ADULT - PROBLEM SELECTOR PROBLEM 4
Elevated blood pressure reading without diagnosis of hypertension

## 2022-10-05 NOTE — PROGRESS NOTE ADULT - PROVIDER SPECIALTY LIST ADULT
Pulmonology
Internal Medicine
Pulmonology
Internal Medicine
Pulmonology
Internal Medicine
Pulmonology
Internal Medicine

## 2022-10-05 NOTE — DISCHARGE NOTE NURSING/CASE MANAGEMENT/SOCIAL WORK - NSDCPEFALRISK_GEN_ALL_CORE
For information on Fall & Injury Prevention, visit: [email or web address] OR  [email or web address] OR  https://www.cdc.gov/steadi/patient.html

## 2022-10-05 NOTE — PROGRESS NOTE ADULT - PROBLEM SELECTOR PLAN 4
better controlled
better controlled  10/3: controlled    10/4; controlled    10/5: STABLE
/75  Not on any antihypertensives at home  Monitor
better controlled  10/3: controlled    10/4; controlled
better controlled  10/3: controlled
/75  Not on any antihypertensives at home  Monitor

## 2022-10-05 NOTE — DISCHARGE NOTE NURSING/CASE MANAGEMENT/SOCIAL WORK - PATIENT PORTAL LINK FT
You can access the FollowMyHealth Patient Portal offered by [facility] by registering at the following website: [email or web address]. By joining [employer]’s FollowMyHealth portal, you will also be able to view your health information using other applications (apps) compatible with our system.

## 2022-10-05 NOTE — PROGRESS NOTE ADULT - PROBLEM SELECTOR PROBLEM 3
Elevated platelet count

## 2022-10-05 NOTE — PROGRESS NOTE ADULT - PROBLEM SELECTOR PLAN 6
Chronic stable  Continue omeprazole
Chronic stable  Continue omeprazole
PPI
Chronic stable  Continue omeprazole
PPI
Chronic stable  Continue omeprazole

## 2022-10-05 NOTE — DISCHARGE NOTE NURSING/CASE MANAGEMENT/SOCIAL WORK - WILL THE PATIENT ACCEPT THE PFIZER COVID-19 VACCINE IF ELIGIBLE AND IT IS AVAILABLE?
TRANSFER - OUT REPORT:    Verbal report given to Scotty Mike RN(name) on John Gee  being transferred to [facility]) for ordered procedure       Report consisted of patients Situation, Background, Assessment and   Recommendations(SBAR). Information from the following report(s) SBAR, ED Summary, [facility] and Recent Results was reviewed with the receiving nurse. Lines:   Peripheral IV 02/01/20 Right Antecubital (Active)   Site Assessment Clean, dry, & intact 2/1/2020 10:34 AM   Phlebitis Assessment 0 2/1/2020 10:34 AM   Infiltration Assessment 0 2/1/2020 10:34 AM   Dressing Status Clean, dry, & intact 2/1/2020 10:34 AM        Opportunity for questions and clarification was provided. No

## 2022-10-05 NOTE — PROGRESS NOTE ADULT - NSPROGADDITIONALINFOA_GEN_ALL_CORE
LEIGHA ACP : and pmd and pt in detail about the pros and cons of ebus biopsy she agrees: arranged for tomorrow at 2 Pm:  CELL: [phone]: DR FREEDMAN; ONC ; DW ABOUT EBUS BIOPSY:    leigha onc: : nga; [phone] ; dr pepe; hematologist; herp vt oncologist    10/4: as above    10/05: LEIGHA SIFUENTES

## 2022-10-17 NOTE — RESULTS/DATA
[FreeTextEntry1] : CBC 10/17/22: reviewed:\par wbc -12.1 \par hgb - 10.5\par plt - 531.0\par anc -7.77\par alc -3.37\par \par Interim PET/CT 6/25/21: \par IMPRESSION: Compared to FDG-PET/CT scan dated 5/10/2021:\par \par 1. Mildly FDG-avid bilateral lung nodules are decreased in size and metabolism, compatible with a partial response to interval therapy ([place] 4). Resolution of mildly FDG-avid bilateral groundglass pulmonary opacities.\par \par 2. Few minimally FDG-avid mediastinal and bilateral hilar lymph nodes are similar in size and decreased in metabolism.\par \par 3. Resolution of FDG activity associated with right upper pole renal lesion which is better delineated on prior contrast-enhanced CT and interval MRI.\par \par 4. Resolution of FDG-avid left adrenal nodule.\par \par 5. Resolution of increased FDG activity in anterior aspect of left 9th rib.\par \par 6. Resolution of linear focus of increased FDG activity in lateral aspect of left chest wall.\par \par 7. Complete opacification of left maxillary sinus with mild, peripheral FDG avidity, unchanged. Correlate clinically for acute sinusitis.\par \par \par \par \par \par \par \par \par \par KIRILL MIR MD; Attending Nuclear Medicine\par This document has been electronically signed. Jun 28 2021  3:35PM\par \par  \par \par \par Final Diagnosis\par 1. 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 Suggest correlation with clinical\par and therapeutic history for underlying immune deficiency; test\par for EBV viral load is recommended.\par \par Microscopic description: Sections show dense lymphoid infiltrate\par with necrosis; predominantly medium-large sized cells forming\par larger clusters and sheets. Angiocentric and angiodestructive\par pattern is noted; confirmed by elastin stain on block 2B.\par Immunohistochemical stains for CD3, CD20, CD79a, Pax5, ,\par Bcl-2, Bcl-6, Mum-1, CD5, CD10, CD23, CD30, CD43, cyclin D1,\par cMYC, p53, and Ki-67, AE1/AE3,  in situ hybridization for\par Renay-Barr virus-\par -encoded small RNA (VEENA), kappa and lambda\par light chains were performed on block 2B. Lymphoma cells are B\par cells, positive VEENA, CD20, CD79a, Pax5 (dim), Mum-1, Bcl2, CD43,\par variable CD30, negative CD5, CD10, CD23, cyclin D1, p53 (<5%),\par cMYC (<20%). Ki-67 proliferation index is high (>90%). CD3+ T\par cells comprise a minor population in the infiltrate.  stains\par few plasma cells; B cells are lambda-restricted.\par Cam 5.2, P40, TTF-1, synaptohysing, chromogranin, PAX-8 and MALU-\par 3 were performed on block 2C. They are negative in lymphoma\par cells.\par \par

## 2022-10-17 NOTE — ASSESSMENT
[FreeTextEntry1] : Ms. Magallon is a 82 yo female with PMHx of  ANCA vasculitis (dx 20 years ago, treated with Azathioprine since then, being followed by Rheum outpt) who presents today for initial consultation for newly Diagnosed Diffuse Large B cell Lymphoma. Pt presented tot he ER on 4/13/21 with chest pain and exertional shortness of breath. Patient reported recent travel to [place] in car (~10 hrs ride). She had a CT angio done which showed right lower lobe and middle lobe pulmonary arterial emboli. She was noted to have B/l LE DVTs - she was started on heparin and switched to Eliquis. Pt need to have chest tube placement as well, discharged with home O2.\par \par In addition pt was found to have large b/l lung masses measuring up to 8.6cm. \par Pulm was consulted and pt underwent biopsy/VATs on 4/16/21, path showed  EBV+ diffuse large B-cell lymphoma. Lymphoma cells are B cells, positive VEENA, CD20, CD79a, Pax5 (dim), Mum-1, Bcl2, CD43,variable CD30, negative CD5, CD10, CD23, cyclin D1, p53 (<5%), cMYC (<20%). Ki-67 proliferation index is high (>90%). CD3+ T cells comprise a minor population in the infiltrate.  stains few plasma cells; B cells are lambda-restricted. \par \par Patient is s/p  CHOP on 5/19,  Rituxan and IT MTX on 5/20 at [facility]. She received Cycle 2 as outpatient. Cycle 4 was complicated by significant neutropenia, thrombocytopenia and fall which caused an ankle fracture. She was discharged form [facility] to [facility]. she left [facility] 1 weeks ago and is nearly independent with all IADL/ADls and is moving well. She received C5 fo R-miniCHOP which was overall well tolerated. She completed treatment on 9/9/21 and PET/CT demonstrated further response to treatment. \par \par #Diffuse Large B Cell Lymphoma EBV positive\par -PET/CT 5/10/21: Status post wedge resection of left upper lobe lung nodule, as compared to CT dated 4/13/2021. Additional FDG-avid, partially necrotic bilateral lung masses and mildly avid groundglass opacities are not significantly changed as compared to prior CT, compatible with biopsy-proven lymphoma. 2. Few mildly FDG-avid, mildly enlarged mediastinal and bilateral hilar lymph nodes are nonspecific. 3. Previously seen indeterminate 3.1 cm lesion in the upper pole of right kidney s FDG-avid. 4. FDG-avid left adrenal nodule is indeterminate. \par -MRI 5/17/21: Right upper pole renal lesion significantly decreased in size of uncertain etiology. A 1.3 cm left adrenal nodule remains indeterminate.\par -s/p C1 RCHOP with IT MTX on 5/19 and 5/20\par - Echo was done inpatient on 4/21/21 with an EF of 69%\par -s/p C3 RCHOP at [facility] on 7/1/2021\par -s/p C4 RCHOP on 7/22/21\par -received C5 of R-miniCHOP on 8/19/21 and C6 on 9/9/21\par -Interim PET/CT 6/25/21 shows no progression of disease and is responding to treatment. \par -EOT PET/CT done 9/27/21 shows Interval further decrease in size and FDG avidity of left upper lung nodule (Deauville 3) and resolution of mildly FDG avid right upper lobe nodule.\par -surveillance CT 12/2021: A left upper lobe lung nodule is stable to slightly decreased in size since 9/27/2021 PET/CT. Mediastinal lymph nodes are also similar. Groundglass and reticular opacities in the lower lobes bilaterally and left upper lobe of uncertain etiology, similar in appearance to prior CT dated 4/13/2021.\par A previously noted indeterminate right renal mass has decreased in size, currently measuring 0.6 cm. A previously noted left adrenal nodule has also decreased in size.\par New mild compression deformity of the superior endplate of L5. Unchanged compression deformity of L1.\par Moderate hiatal hernia with partially intrathoracic stomach.\par -CT scan 4/28/2022: no new areas of disease. Stable mediastinal lymphadenopathy.Bilateral patchy groundglass and reticular opacities which have overall decreased from prior CT.\par -Will plan for surveillance CT scan in the coming week\par -c/w port flushes (plan to keep port for 1 year from last treatment)\par -EBV level now undetectable, will check at each visit \par -recent hospitalization to [facility] on 9/30/22 for 2 weeks of  SOB on exertion, nonproductive cough. CTA chest showed  worsening GGOs and mediastinal LAD (i.e. 2 x 1.2 --> 2.4 x 1.8 cm), concerning for recurrence of her lymphoma.  Stable residual LUIS mass (1.4 x 1 cm) compared to last CT. s/p EBUS guided FNA which revealed lymphoplasmacytic cells present .\par -PET/CT for restaging on 10/21/22\par  -will refer to cardiothoracic for further diagnostic biopsy\par -IVF and port flush today at [facility]\par \par \par #PE/DVT\par - can d/c Eliquis (has been on AC for 1 year)\par \par #Right Kidney lesion\par - Pt was noted to have a hypodense lesion on right upper pole on CT done 4/13/21\par - consider MRI after interim PET/CT for further characterization, suspect this could be related to DLBCL\par \par #ANCA positive vasculitis \par -hold azathioprine\par -discussed case with Dr. Romano- rituximab should maintain her remission and will monitor symptoms after completion of therapy \par -f/u rheum\par -monitor for symptoms of recurrence (bone/back pain) \par \par RTO in 2 weeks ,sooner PRN\par

## 2022-10-17 NOTE — REVIEW OF SYSTEMS
[Negative] : Allergic/Immunologic [Fatigue] : fatigue [Recent Change In Weight] : ~T recent weight change [Cough] : cough [SOB on Exertion] : shortness of breath during exertion [Shortness Of Breath] : no shortness of breath [Wheezing] : no wheezing [FreeTextEntry4] : no mouth sores [FreeTextEntry7] : Reflux

## 2022-10-17 NOTE — HISTORY OF PRESENT ILLNESS
[de-identified] : Ms. Magallon is a 79 yo female with PMHx of ANCA vasculitis (dx 20 years ago, been on Azathioprine since then, being followed by Rheum outpt) who presents today for initial consultation for newly Diagnosed Diffuse Large B cell Lymphoma. Pt noticed earlier this year that she had more SOB with activity and fatigue. She did not have night sweats, fever/chills or weight loss with this fatigue.\par Pt presented tot he ER on 4/13/21 with chest pain lasting and exertional shortness of breath. Patient reported recent travel to [place] in car (~10 hrs ride). She had a CT angio done which showed right lower lobe and middle lobe pulmonary arterial emboli. She was noted to have B/l LE DVTs - she was started on heparin and switched to Eliquis. Pt need to have chest tube placement as well and was d/c on O2 therapy. Pt states she used O2 for the first few days at home but does not currently use it. O2 stats at home have been in the 90s\par In addition pt was found to have large b/l lung masses measuring up to 8.6cm. \par Pulm was consulted and pt underwent biopsy/VATs on 4/16/21, path showed  EBV+ diffuse large B-cell lymphoma. Lymphoma cells are B cells, positive VEENA, CD20, CD79a, Pax5 (dim), Mum-1, Bcl2, CD43,variable CD30, negative CD5, CD10, CD23, cyclin D1, p53 (<5%), cMYC (<20%). Ki-67 proliferation index is high (>90%). CD3+ T cells comprise a minor population in the infiltrate.  stains few plasma cells; B cells are lambda-restricted. Cam 5.2, P40, TTF-1, synaptophysin, chromogranin, PAX-8 and MALU-3 were performed on block 2C. They are negative in lymphoma cells.\par \par She received Cycle 1 of R-CHOP on 5/20/21 while admitted to monitor her respiratory function. She had a LP with IT MTX which did not demonstrate involvement with lymphoma.  She received C2-C3 only complicated by fatigue. After C4, she became neutropenic and profoundly fatigued. She has a fall at home prior to presenting to [facility] for IVF hydration. At that time, platelets were 10K. She was sent ot the ER and admitted for cytopenias and new ankle fracture. She was discharged to rehab at [facility]. She received C5 of mini-R-CHOP given her prior complications which was overall well tolerated and completed therapy on 9/9/21 (6 cycles). \par \par \par  [de-identified] : S/p  Cycle 6 R-mini CHOP on 9/9/21:\par 10/17/22: Patient presents today for follow up after recent hospitalization to [facility] on 9/30/22 for 2 weeks of  SOB on exertion, nonproductive cough. CTA chest showed  worsening GGOs and mediastinal LAD (i.e. 2 x 1.2 --> 2.4 x 1.8 cm), concerning for recurrence of her lymphoma.  Stable residual LUIS mass (1.4 x 1 cm) compared to last CT. s/p EBUS guided FNA which revealed lymphoplasmacytic cells present .\par Patient continues to endorse fatigue and weakness since discharge.She also continues endorse dry cough. She has a poor appetite. She is currently trying protein shakes. No fevers. No drenching night sweats. No CP/SOB. No bleeding. No new infections. \par \par \par

## 2022-10-17 NOTE — PHYSICAL EXAM
[Restricted in physically strenuous activity but ambulatory and able to carry out work of a light or sedentary nature] : Status 1- Restricted in physically strenuous activity but ambulatory and able to carry out work of a light or sedentary nature, e.g., light house work, office work [Normal] : affect appropriate [de-identified] : lesions on b/l LE - small, irregular shape, with erythema and scales - stable, she is following up with Derm

## 2022-10-26 PROBLEM — Z86.79 HISTORY OF HYPERTENSION: Status: RESOLVED | Noted: 2022-01-01 | Resolved: 2022-01-01

## 2022-10-26 PROBLEM — Z86.718 HISTORY OF DEEP VENOUS THROMBOSIS: Status: RESOLVED | Noted: 2022-01-01 | Resolved: 2022-01-01

## 2022-10-26 PROBLEM — E78.5 HYPERLIPIDEMIA: Status: ACTIVE | Noted: 2021-05-07

## 2022-10-26 NOTE — HISTORY OF PRESENT ILLNESS
[FreeTextEntry1] : Ms. BETTIE PRATER, 81 year old female, non smoker, w/ hx of HTN, ANCA vasculitis (dx 20 years ago, been on Azathioprine since then, being followed by Rheum) who presented to ED on 4/13/21 w/ complaints of chest pain.  CTA she was found to have a pulmonary embolus & bilateral pulmonary masses & DVTs. She was started on anticoagulation with a Heparin Drip; CT Surgery was consulted and patient was advised to have tissue diagnosis.\par \par -s/p Flex Bronch, uniportal left VATS, wedge resection of left upper lobe x1, intercostal nerve block. on 4/16/21. Path revealing Involvement by EBV+ diffuse large B-cell lymphoma.\par \par Patient was referred to Dr. Zonia Madrigal, started chemo on 05/19/2021, s/p 6 cycles completed on 09/09/2021. \par \par Patient was hospitalized Sept. 2022 for  SOB on exertion and nonproductive cough. CTA of chest showed worsening GGOs and mediastinal LAD (i.e. 2 x 1.2 --> 2.4 x 1.8 cm), concerning for recurrence of her lymphoma. Stable residual LUIS mass (1.4 x 1 cm) compared to last CT. \par \par CTA on 09/30/2022:\par - Increased mediastinal lymphadenopathy. A reference low right paratracheal node measures 2.4 x 1.8 cm (2, 39), interval increase in size from prior when it measured 2.0 x 1.2 cm. Moderate-sized hiatal hernia.\par -  Since 8/6/2022, there are new patchy groundglass opacities, for example in the anterior right upper lobe on series 2 image 28 and right middle lobe peripherally. The 1.4 x 1.0 cm irregular left upper lobe nodule (2, 37) is unchanged since the prior study and decreased since more remote studies. Right lower lobe bulla.\par \par Patient is s/p EBUS guided FNA of Level 7 LN, which revealed lymphoplasmacytic cells present. Heterogeneous lymphoid cells are seen, with increased plasmacytoid/plasma cells. Cell block contains limited lymphoid tissue, they show polytypic kappa or lambda light chains by FLORINDA stain, VEENA and CD30 are negative. Flow cytometry analysis shows no diagnostic abnormalities. The overall findings are not diagnostic of lymphoproliferative disorder/lymphoma, clinical correlation is necessary. H/O EBV+ DLBCL is noted. Suggest additional tissue examination if lymphadenopathy persists and clinically indicated.\par \par PET/CT on 10/21/2022: \par - Multiple FDG-avid mildly enlarged mediastinal and bilateral hilar lymph nodes are increased in size, number, and metabolism as compared to prior PET/CT (9.27/21), and are not significantly changed as compared to recent diagnostic CT. For reference, a precarinal lymph node measures 1.6 x 1.2 cm, SUV 5.7 (image 78); previously 1.3 x 1.1 cm, SUV 2.6. A difficult to delineate right perihilar lymph node demonstrates SUV 5.1 (image 80); previous SUV 3.1\par -  A Mediport is present in the left anterior chest wall with tip of catheter in superior vena cava.\par - Diffuse patchy mildly FDG-avid bilateral groundglass opacities, most prominent in the lower lobes, are new as compared to prior PET/CT, and appear increased since 9/30/2022, however, comparison is limited due to differences in CT technique. An FDG-avid right upper lobe pulmonary nodule is new as compared to prior PET/CT, and increased in size and density as compared to CT dated 9/30/2022, measuring 1.2 x 1.1 cm, SUV 9.5 (image 70). An irregular left upper lobe pulmonary nodule containing small focus of increased FDG activity is unchanged as compared to CT dated 9/30/2022, and decreased in size and metabolism as compared to prior PET/CT, measuring 1.4 x 1.0 cm, SUV 2.6 (image 77); previously 2.2 x 2.1 cm, SUV 3.7. Non-FDG avid right lower lobe cyst with peripheral wall thickening is unchanged measuring approximately 2 x 3 cm (image 95). Non-FDG avid postsurgical changes of left upper lobe wedge resection, unchanged.\par - Diffuse splenic hypermetabolism, more intense than hepatic uptake of FDG, and increased as compared to prior study (SUV 5.8; image 107). Normal in size.\par \par Patient is referred back to cardiothoracic for further diagnostic biopsy. \par

## 2022-10-26 NOTE — PHYSICAL EXAM
[Capable of only limited self care, confined to bed or chair more than 50% of waking hours] : Status 3- Capable of only limited self care, confined to bed or chair more than 50% of waking hours [Sclera] : the sclera and conjunctiva were normal [PERRL With Normal Accommodation] : pupils were equal in size, round, and reactive to light [Extraocular Movements] : extraocular movements were intact [Neck Appearance] : the appearance of the neck was normal [Neck Cervical Mass (___cm)] : no neck mass was observed [] : no respiratory distress [Respiration, Rhythm And Depth] : normal respiratory rhythm and effort [Exaggerated Use Of Accessory Muscles For Inspiration] : no accessory muscle use [Auscultation Breath Sounds / Voice Sounds] : lungs were clear to auscultation bilaterally [Heart Rate And Rhythm] : heart rate was normal and rhythm regular [Examination Of The Chest] : the chest was normal in appearance [Chest Visual Inspection Thoracic Asymmetry] : no chest asymmetry [Diminished Respiratory Excursion] : normal chest expansion [2+] : left 2+ [Breast Appearance] : normal in appearance [Breast Palpation Mass] : no palpable masses [Bowel Sounds] : normal bowel sounds [Abdomen Soft] : soft [Abdomen Tenderness] : non-tender [Cervical Lymph Nodes Enlarged Posterior Bilaterally] : posterior cervical [Cervical Lymph Nodes Enlarged Anterior Bilaterally] : anterior cervical [Supraclavicular Lymph Nodes Enlarged Bilaterally] : supraclavicular [No CVA Tenderness] : no ~M costovertebral angle tenderness [No Spinal Tenderness] : no spinal tenderness [Involuntary Movements] : no involuntary movements were seen [Skin Color & Pigmentation] : normal skin color and pigmentation [No Focal Deficits] : no focal deficits [Oriented To Time, Place, And Person] : oriented to person, place, and time [FreeTextEntry1] : Deferred

## 2022-10-26 NOTE — CONSULT LETTER
[Dear  ___] : Dear  [unfilled], [Courtesy Letter:] : I had the pleasure of seeing your patient, [unfilled], in my office today. [Please see my note below.] : Please see my note below. [Consult Closing:] : Thank you very much for allowing me to participate in the care of this patient.  If you have any questions, please do not hesitate to contact me. [Sincerely,] : Sincerely, [FreeTextEntry2] :  Dr. Zonia Madrigal ([facility])\par  [FreeTextEntry3] : Triston Pettit MD, FACS \par Chief, Division of Thoracic Surgery \par Director, Minimally Invasive Thoracic Surgery \par Department of Cardiovascular and Thoracic Surgery \par [facility] \par , Cardiovascular and Thoracic Surgery\par \par \par

## 2022-10-26 NOTE — REVIEW OF SYSTEMS
[As Noted in HPI] : as noted in HPI [Negative] : Heme/Lymph [FreeTextEntry9] : + lower back pain; Only able to ambulate short distances due to dyspnea

## 2022-11-01 PROBLEM — Z01.818 PRE-OP TESTING: Status: ACTIVE | Noted: 2022-01-01

## 2022-11-01 NOTE — ASSESSMENT
[FreeTextEntry1] : Ms. Magallon is a 82 yo female with PMHx of  ANCA vasculitis (dx 20 years ago, treated with Azathioprine since then, being followed by Rheum outpt) who presents today for initial consultation for newly Diagnosed Diffuse Large B cell Lymphoma. Pt presented tot he ER on 4/13/21 with chest pain and exertional shortness of breath. Patient reported recent travel to [place] in car (~10 hrs ride). She had a CT angio done which showed right lower lobe and middle lobe pulmonary arterial emboli. She was noted to have B/l LE DVTs - she was started on heparin and switched to Eliquis. Pt need to have chest tube placement as well, discharged with home O2.\par \par In addition pt was found to have large b/l lung masses measuring up to 8.6cm. \par Pulm was consulted and pt underwent biopsy/VATs on 4/16/21, path showed  EBV+ diffuse large B-cell lymphoma. Lymphoma cells are B cells, positive VEENA, CD20, CD79a, Pax5 (dim), Mum-1, Bcl2, CD43,variable CD30, negative CD5, CD10, CD23, cyclin D1, p53 (<5%), cMYC (<20%). Ki-67 proliferation index is high (>90%). CD3+ T cells comprise a minor population in the infiltrate.  stains few plasma cells; B cells are lambda-restricted. \par \par Patient is s/p  CHOP on 5/19,  Rituxan and IT MTX on 5/20 at [facility]. She received Cycle 2 as outpatient. Cycle 4 was complicated by significant neutropenia, thrombocytopenia and fall which caused an ankle fracture. She was discharged form [facility] to [facility]. she left [facility] 1 weeks ago and is nearly independent with all IADL/ADls and is moving well. She received C5 fo R-miniCHOP which was overall well tolerated. She completed treatment on 9/9/21 and PET/CT demonstrated further response to treatment. \par \par #Diffuse Large B Cell Lymphoma EBV positive\par -PET/CT 5/10/21: Status post wedge resection of left upper lobe lung nodule, as compared to CT dated 4/13/2021. Additional FDG-avid, partially necrotic bilateral lung masses and mildly avid groundglass opacities are not significantly changed as compared to prior CT, compatible with biopsy-proven lymphoma. 2. Few mildly FDG-avid, mildly enlarged mediastinal and bilateral hilar lymph nodes are nonspecific. 3. Previously seen indeterminate 3.1 cm lesion in the upper pole of right kidney s FDG-avid. 4. FDG-avid left adrenal nodule is indeterminate. \par -MRI 5/17/21: Right upper pole renal lesion significantly decreased in size of uncertain etiology. A 1.3 cm left adrenal nodule remains indeterminate.\par -s/p C1 RCHOP with IT MTX on 5/19 and 5/20\par - Echo was done inpatient on 4/21/21 with an EF of 69%\par -s/p [place] [facility] [place]\par -s/p C4 RCHOP on 7/22/21\par -received C5 of R-miniCHOP on 8/19/21 and C6 on 9/9/21\par -Interim PET/CT 6/25/21 shows no progression of disease and is responding to treatment. \par -EOT PET/CT done 9/27/21 shows Interval further decrease in size and FDG avidity of left upper lung nodule (Deauville 3) and resolution of mildly FDG avid right upper lobe nodule.\par -surveillance CT 12/2021: A left upper lobe lung nodule is stable to slightly decreased in size since 9/27/2021 PET/CT. Mediastinal lymph nodes are also similar. Groundglass and reticular opacities in the lower lobes bilaterally and left upper lobe of uncertain etiology, similar in appearance to prior CT dated 4/13/2021.\par A previously noted indeterminate right renal mass has decreased in size, currently measuring 0.6 cm. A previously noted left adrenal nodule has also decreased in size.\par New mild compression deformity of the superior endplate of L5. Unchanged compression deformity of L1.\par Moderate hiatal hernia with partially intrathoracic stomach.\par -CT scan 4/28/2022: no new areas of disease. Stable mediastinal lymphadenopathy.Bilateral patchy groundglass and reticular opacities which have overall decreased from prior CT.\par -Will plan for surveillance CT scan in the coming week\par -c/w port flushes (plan to keep port for 1 year from last treatment)\par -EBV level correlates with disease status, check at each visit \par -recent hospitalization to [facility] on 9/30/22 for 2 weeks of  SOB on exertion, nonproductive cough. CTA chest showed  worsening GGOs and mediastinal LAD (i.e. 2 x 1.2 --> 2.4 x 1.8 cm), concerning for recurrence of her lymphoma.  Stable residual LUIS mass (1.4 x 1 cm) compared to last CT. s/p EBUS guided FNA which revealed lymphoplasmacytic cells present but not diagnostic of recurrent DLBCL \par -PET/CT for restaging on 10/21/22\par 1. Diffuse patchy mildly FDG-avid bilateral groundglass pulmonary opacities are new as compared to prior PET/CT, and appear increased since 9/30/2022, however, comparison is limited due to differences in CT technique. An FDG-avid right upper lobe pulmonary nodule is new as compared to prior PET/CT, and increased in size and density as compared to CT dated 9/30/2022 (SUV 9.5). Findings are concerning for progression of lymphoma. Correlate clinically to exclude infection. An irregular left upper lobe pulmonary nodule containing small focus of increased FDG activity is unchanged as compared to CT dated 9/30/2022, and is decreased in size and metabolism as compared to prior PET/CT.\par 2. Multiple nonspecific FDG-avid mildly enlarged mediastinal and bilateral hilar lymph nodes are increased in size, number, and metabolism. Differential diagnosis includes recurrent lymphoma.\par 3. New nonspecific diffuse splenic and bone marrow hypermetabolism may be secondary to lymphoma.\par 4. Resolution of FDG-avid left maxillary sinus mucosal thickening. New non-FDG-avid minimal right maxillary sinus mucosal thickening.\par  -will refer to cardiothoracic for further diagnostic biopsy; pt followed with Dr. Pettit - planning for surgery on 11/9/22\par -CBC from 10/29/22 - Hgb 7.2 - will send over to [facility] for T&C today with 2 units PRBC 11/1/22\par -Bone marrow bx 11/1/22\par \par \par #PE/DVT\par - can d/c Eliquis (has been on AC for 1 year)\par \par #Right Kidney lesion\par - Pt was noted to have a hypodense lesion on right upper pole on CT done 4/13/21\par - consider MRI after interim PET/CT for further characterization, suspect this could be related to DLBCL\par \par #ANCA positive vasculitis \par -hold azathioprine\par -discussed case with Dr. Romano- rituximab should maintain her remission and will monitor symptoms after completion of therapy \par -f/u rheum\par -monitor for symptoms of recurrence (bone/back pain) \par \par RTO in 1 week ,sooner PRN\par

## 2022-11-01 NOTE — RESULTS/DATA
[FreeTextEntry1] : CBC 10/31/22: reviewed:\par wbc - 15.8\par hgb - 7.2\par plt - 428\par anc -11.9\par alc -2.7\par \par Interim PET/CT 6/25/21: \par IMPRESSION: Compared to FDG-PET/CT scan dated 5/10/2021:\par \par 1. Mildly FDG-avid bilateral lung nodules are decreased in size and metabolism, compatible with a partial response to interval therapy ([place] 4). Resolution of mildly FDG-avid bilateral groundglass pulmonary opacities.\par \par 2. Few minimally FDG-avid mediastinal and bilateral hilar lymph nodes are similar in size and decreased in metabolism.\par \par 3. Resolution of FDG activity associated with right upper pole renal lesion which is better delineated on prior contrast-enhanced CT and interval MRI.\par \par 4. Resolution of FDG-avid left adrenal nodule.\par \par 5. Resolution of increased FDG activity in anterior aspect of left 9th rib.\par \par 6. Resolution of linear focus of increased FDG activity in lateral aspect of left chest wall.\par \par 7. Complete opacification of left maxillary sinus with mild, peripheral FDG avidity, unchanged. Correlate clinically for acute sinusitis.\par \par \par \par \par \par \par \par \par \par KIRILL MIR MD; Attending Nuclear Medicine\par This document has been electronically signed. Jun 28 2021  3:35PM\par \par  \par \par \par Final Diagnosis\par 1. 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 Suggest correlation with clinical\par and therapeutic history for underlying immune deficiency; test\par for EBV viral load is recommended.\par \par Microscopic description: Sections show dense lymphoid infiltrate\par with necrosis; predominantly medium-large sized cells forming\par larger clusters and sheets. Angiocentric and angiodestructive\par pattern is noted; confirmed by elastin stain on block 2B.\par Immunohistochemical stains for CD3, CD20, CD79a, Pax5, ,\par Bcl-2, Bcl-6, Mum-1, CD5, CD10, CD23, CD30, CD43, cyclin D1,\par cMYC, p53, and Ki-67, AE1/AE3,  in situ hybridization for\par Renay-Barr virus-\par -encoded small RNA (VEENA), kappa and lambda\par light chains were performed on block 2B. Lymphoma cells are B\par cells, positive VEENA, CD20, CD79a, Pax5 (dim), Mum-1, Bcl2, CD43,\par variable CD30, negative CD5, CD10, CD23, cyclin D1, p53 (<5%),\par cMYC (<20%). Ki-67 proliferation index is high (>90%). CD3+ T\par cells comprise a minor population in the infiltrate.  stains\par few plasma cells; B cells are lambda-restricted.\par Cam 5.2, P40, TTF-1, synaptohysing, chromogranin, PAX-8 and MALU-\par 3 were performed on block 2C. They are negative in lymphoma\par cells.\par \par

## 2022-11-01 NOTE — PROCEDURE
[Bone Marrow Biopsy] : bone marrow biopsy [Bone Marrow Aspiration] : bone marrow aspiration  [Patient] : the patient [Patient identification verified] : patient identification verified [Procedure verified and consent obtained] : procedure verified and consent obtained [Laterality verified and correct site marked] : laterality verified and correct site marked [Left] : site: left [Correct positioning] : correct positioning [Prone] : prone [Left lateral decibitus position] : left lateral decibitus position [Superior iliac spine was identified] : the superior iliac spine was identified. [The left posterior iliac crest was prepped with betadine and draped, using sterile technique.] : The left posterior iliac crest was prepped with betadine and draped, using sterile technique. [Lidocaine was injected and into the periosteum overlying the site.] : Lidocaine was injected and into the periosteum overlying the site. [Aspirate] : aspirate [Cytogenetics] : cytogenetics [FISH] : FISH [Biopsy] : biopsy [Flow Cytometry] : flow cytometry [] : The patient was instructed to remove the bandage the following AM. The patient may bathe. Acetaminophen may be taken for discomfort, as per package directions.If there are any other problems, the patient was instructed to call the office. The patient verbalized understanding, and is aware of the office contact numbers.

## 2022-11-01 NOTE — HISTORY OF PRESENT ILLNESS
[de-identified] : Ms. Magallon is a 80 yo female with PMHx of ANCA vasculitis (dx 20 years ago, been on Azathioprine since then, being followed by Rheum outpt) who presents today for initial consultation for newly Diagnosed Diffuse Large B cell Lymphoma. Pt noticed earlier this year that she had more SOB with activity and fatigue. She did not have night sweats, fever/chills or weight loss with this fatigue.\par Pt presented tot he ER on 4/13/21 with chest pain lasting and exertional shortness of breath. Patient reported recent travel to [place] in car (~10 hrs ride). She had a CT angio done which showed right lower lobe and middle lobe pulmonary arterial emboli. She was noted to have B/l LE DVTs - she was started on heparin and switched to Eliquis. Pt need to have chest tube placement as well and was d/c on O2 therapy. Pt states she used O2 for the first few days at home but does not currently use it. O2 stats at home have been in the 90s\par In addition pt was found to have large b/l lung masses measuring up to 8.6cm. \par Pulm was consulted and pt underwent biopsy/VATs on 4/16/21, path showed  EBV+ diffuse large B-cell lymphoma. Lymphoma cells are B cells, positive VEENA, CD20, CD79a, Pax5 (dim), Mum-1, Bcl2, CD43,variable CD30, negative CD5, CD10, CD23, cyclin D1, p53 (<5%), cMYC (<20%). Ki-67 proliferation index is high (>90%). CD3+ T cells comprise a minor population in the infiltrate.  stains few plasma cells; B cells are lambda-restricted. Cam 5.2, P40, TTF-1, synaptophysin, chromogranin, PAX-8 and MALU-3 were performed on block 2C. They are negative in lymphoma cells.\par \par She received Cycle 1 of R-CHOP on 5/20/21 while admitted to monitor her respiratory function. She had a LP with IT MTX which did not demonstrate involvement with lymphoma.  She received C2-C3 only complicated by fatigue. After C4, she became neutropenic and profoundly fatigued. She has a fall at home prior to presenting to [facility] for IVF hydration. At that time, platelets were 10K. She was sent ot the ER and admitted for cytopenias and new ankle fracture. She was discharged to rehab at [facility]. She received C5 of mini-R-CHOP given her prior complications which was overall well tolerated and completed therapy on 9/9/21 (6 cycles). \par \par \par  [de-identified] : S/p  Cycle 6 R-mini CHOP on 9/9/21:\par 10/17/22: Patient presents today for follow up after recent hospitalization to [facility] on 9/30/22 for 2 weeks of  SOB on exertion, nonproductive cough. CTA chest showed  worsening GGOs and mediastinal LAD (i.e. 2 x 1.2 --> 2.4 x 1.8 cm), concerning for recurrence of her lymphoma.  Stable residual LUIS mass (1.4 x 1 cm) compared to last CT. s/p EBUS guided FNA which revealed lymphoplasmacytic cells present but not diagnostic of recurrent DLBCL. \par \par \par 10/31/22: Since last visit pt followed with Dr. Pettit, planning for surgery 11/9/22 - pt will need cardiology and pulmonary clearance prior to procedure. Patient continues to endorse fatigue and weakness.She also continues endorse dry cough. She has a poor appetite. She is currently trying protein shakes. No fevers. No drenching night sweats. No CP/SOB. No bleeding. No new infections. \par \par

## 2022-11-01 NOTE — PHYSICAL EXAM
[de-identified] : lesions on b/l LE - small, irregular shape, with erythema and scales - stable, she is following up with Derm

## 2022-11-01 NOTE — REVIEW OF SYSTEMS
[Shortness Of Breath] : no shortness of breath [Wheezing] : no wheezing [FreeTextEntry4] : no mouth sores [FreeTextEntry7] : Reflux

## 2022-11-03 PROBLEM — R91.1 LUNG NODULE: Status: ACTIVE | Noted: 2022-01-01

## 2022-11-03 PROBLEM — R06.09 DYSPNEA ON EXERTION: Status: ACTIVE | Noted: 2022-01-01

## 2022-11-03 PROBLEM — C85.90 LYMPHOMA: Status: ACTIVE | Noted: 2021-05-04

## 2022-11-04 PROBLEM — I05.0 MITRAL STENOSIS: Status: ACTIVE | Noted: 2022-01-01

## 2022-11-04 PROBLEM — Z13.6 SCREENING FOR CARDIOVASCULAR CONDITION: Status: ACTIVE | Noted: 2022-01-01

## 2022-11-04 PROBLEM — Z01.810 PRE-OPERATIVE CARDIOVASCULAR EXAMINATION: Status: ACTIVE | Noted: 2022-01-01

## 2022-11-04 NOTE — H&P PST ADULT - PROBLEM SELECTOR PLAN 1
Pt with solitary right upper lobe pulmonary nodule. pt presents for preop eval to have flexible bronchoscopy right VATS, lung resection, with interventional radiology marking of right upper lobe nodule, and mediastinal lymph node dissection on 11/10/22.  labs pending, ekg in chart.  Preop instructions provided to pt.  Famotidine and chlorhexidine scrubs provided to pt with instructions.  Pt advised to obtain COVID pcr 3 days prior to DOS.

## 2022-11-04 NOTE — H&P PST ADULT - ASSESSMENT
Pt with solitary right upper lobe pulmonary nodule. pt presents for preop eval to have flexible bronchoscopy right VATS, lung resection, with interventional radiology marking of right upper lobe nodule, and mediastinal lymph node dissection on 11/10/22.

## 2022-11-04 NOTE — H&P PST ADULT - RESPIRATORY
normal/clear to auscultation bilaterally/no wheezes/no rales/no rhonchi clear to auscultation bilaterally/no wheezes/no rales/no rhonchi

## 2022-11-04 NOTE — REASON FOR VISIT
[FreeTextEntry1] : Dear Dr. Pettit:\par \par I had the pleasure of evaluating Ms. BETTIE PRATER for initial cardiovascular and pre-operative cardiovascular consultation.\par \par She is a 81 year old woman, non-smoker, with HTN, ANCA vasculitis, prior PE/DVT, diffuse large B-cell lymphoma, completed 6 cycles of chemotherapy on 09/09/2021.  She was hospitalized on September 2022 for exertional dyspnea and nonproductive cough. CTA chest demonstrated worsening GGOs and mediastinal LAD, concerning for recurrence of her lymphoma. Stable residual LUIS mass (1.4 x 1 cm) compared to last CT. Subsequently sent for PET/CT with findings concerning for progression of lymphoma. \par \par The surgical plan is for the patient to undergo bronchoscopy, right VATS, lung resection with IR marking of right upper lobe nodule nodule, and mediastinal lymph node dissection.\par \par She reports that her exercise capacity is limited to exertional dyspnea that limits her walk distance.  She believes her symptoms are related to her underlying pulmonary condition.  She has no other associated cardiac complaints.\par \par Echocardiogram performed on 11/3/22 noted normal biventricular systolic function, LVEF 65-70%, mild to moderate mitral stenosis, mild AI, mild-moderate TR, with mild pulmonary hypertension (PASP 41 mmHg).\par \par My review of her 12-lead ECG notes sinus tachycardia at 114 bpm, normal axis, normal intervals.\par \par From the cardiac perspective, the patient appears to be medically optimized and may proceed with thoracic surgery as outlined above, without the need for additional cardiac testing or risk stratification.\par Advise judicious use of moody- and intra-operative intravenous fluids in the setting of mitral stenosis.\par \par Thank you for involving me in her care.\par \par Warmest regards,\par Ady Cooper\par \par

## 2022-11-04 NOTE — H&P PST ADULT - MUSCULOSKELETAL
normal/ROM intact/normal gait/strength 5/5 bilateral upper extremities/strength 5/5 bilateral lower extremities details… decreased ROM/decreased strength

## 2022-11-04 NOTE — H&P PST ADULT - PROBLEM SELECTOR PLAN 2
Pt had cardiac clearance with Dr. Cooper - will obtain copy.   EKG done by Cardiologist office - will obtain copy

## 2022-11-04 NOTE — H&P PST ADULT - PROBLEM SELECTOR PLAN 3
Pt looks tired and weak.  Temp 99.6, tachy at 120. b/p 130/80 and P.ox 96  Spoke to Dr. Zonia Madrigal, pt's oncologist regarding sending the pt to ER. As per her oncologist, patient has been this way for a few days and pt is receiving hydration at home as pt is not eating or hydrating well. could be because of her disease condition. Pt also not willing to go to ER. Pt send home in wheel chair accompanied by her son.

## 2022-11-04 NOTE — H&P PST ADULT - NEUROLOGICAL
normal/cranial nerves II-XII intact/sensation intact details… cranial nerves II-XII intact/sensation intact

## 2022-11-04 NOTE — H&P PST ADULT - PRIMARY CARE PROVIDER
Dr Shaina Bernardo (PCP) ; Dr. Ady Estrada [facility] - Cardiologist - [phone]

## 2022-11-04 NOTE — PHYSICAL EXAM
[No Acute Distress] : no acute distress [Normal Conjunctiva] : normal conjunctiva [Normal Venous Pressure] : normal venous pressure [No Carotid Bruit] : no carotid bruit [Normal S1, S2] : normal S1, S2 [No Rub] : no rub [No Gallop] : no gallop [Clear Lung Fields] : clear lung fields [Good Air Entry] : good air entry [No Respiratory Distress] : no respiratory distress  [Soft] : abdomen soft [Non Tender] : non-tender [No Masses/organomegaly] : no masses/organomegaly [Normal Bowel Sounds] : normal bowel sounds [Normal Gait] : normal gait [No Edema] : no edema [No Cyanosis] : no cyanosis [No Clubbing] : no clubbing [No Varicosities] : no varicosities [No Rash] : no rash [No Skin Lesions] : no skin lesions [Moves all extremities] : moves all extremities [No Focal Deficits] : no focal deficits [Normal Speech] : normal speech [Alert and Oriented] : alert and oriented [Normal memory] : normal memory [de-identified] : Grade 2/6 DM

## 2022-11-04 NOTE — H&P PST ADULT - CARDIOVASCULAR
details… normal/regular rate and rhythm/S1 S2 present/no gallops/no rub/no murmur regular rate and rhythm/S1 S2 present/no gallops/no rub

## 2022-11-04 NOTE — H&P PST ADULT - NSICDXPASTMEDICALHX_GEN_ALL_CORE_FT
PAST MEDICAL HISTORY:  ANCA-associated vasculitis     Anxiety and depression     DLBCL (diffuse large B cell lymphoma)     DVT, lower extremity 2021    GERD (gastroesophageal reflux disease)     Hyperlipidemia     Lung mass s/p left wedge resection    Pulmonary embolism 2021

## 2022-11-04 NOTE — H&P PST ADULT - HISTORY OF PRESENT ILLNESS
81 year old female with PMH of  HDL, depression recently diagnosed with Right PE and B/l LE DVT on Eliquis discontinued 6 months ago, received chemo via chemoport, last chemo Sep 2021.  Pt also found to have left lung mass on CT scan. S/P Left VATs and left lobe wedge resection on 4/16/21.   Pt now found to have a solitary right upper lobe pulmonary nodule. pt presents for preop eval to have flexible bronchoscopy right VATS, lung resection, with interventional radiology marking of right upper lobe nodule, and mediastinal lymph node dissection on 11/10/22.

## 2022-11-04 NOTE — H&P PST ADULT - NSICDXPASTSURGICALHX_GEN_ALL_CORE_FT
PAST SURGICAL HISTORY:  History of appendectomy     Lung nodule pt had wedge resection in 2021    Non-Hodgkins lymphoma chemoport inserted in 2021

## 2022-11-09 PROBLEM — C83.30: Status: ACTIVE | Noted: 2021-06-02

## 2022-11-09 NOTE — H&P ADULT - NSHPLABSRESULTS_GEN_ALL_CORE
< end of copied text >    < from: CT Angio Chest PE Protocol w/ IV Cont (09.30.22 @ 18:25) >      ACC: [id number] EXAM:  CT ANGIO CHEST PULM ART WAWIC                          PROCEDURE DATE:  09/30/2022          INTERPRETATION:  CLINICAL INFORMATION: Cough and fatigue. History of   diffuse large B-cell lymphoma. History of ANCA vasculitis    COMPARISON: CT chest abdomen pelvis 8/6/2022    CONTRAST/COMPLICATIONS:  IV Contrast: Omnipaque 350  60 cc administered  Oral Contrast: NONE  Complications: None reported at time of study completion    PROCEDURE:  CT Angiogram of the chest was obtained with intravenous contrast. Three   dimensional maximum intensity projection (MIP) images were generated.    FINDINGS:    PULMONARY ANGIOGRAM: There is adequate filling of the pulmonary arterial   tree. There is no main, lobar, segmental or subsegmentalpulmonary   embolism.    MEDIASTINUM/LYMPH NODES: Increased mediastinal lymphadenopathy. A   reference low right paratracheal node measures 2.4 x 1.8 cm (2, 39),   interval increase in size from prior when it measured 2.0 x 1.2 cm.   Moderate-sized hiatal hernia.    HEART/VASCULATURE: Heart size normal. No pericardial effusion. Coronary   artery calcifications. Aortic calcifications. Left chest wall port with   tip terminating at the superior cavoatrial junction.    AIRWAYS/LUNGS/PLEURA: Left upper lobe wedge resection. The central   airways are patent. Since 8/6/2022, there are new patchy groundglass   opacities, for example in the anterior right upper lobe on series 2 image   28 and right middle lobe peripherally. The 1.4 x 1.0 cm irregularleft   upper lobe nodule (2, 37) is unchanged since the prior study and   decreased since more remote studies. Right lower lobe bulla. No pleural   effusion.    UPPER ABDOMEN: No acute findings    BONES/SOFT TISSUES: Unchanged T11 and L1 compression deformities.    IMPRESSION:  No pulmonary embolism.    New patchy groundglass opacities since 8/6/2022, likely   infectious/inflammatory    Increased mediastinal lymphadenopathy.    Unchanged left upper lobe nodule since the prior study and decreased   since more remote studies.    --- End of Report ---          LORETTA TANG MD; Resident Radiologist  This document has been electronically signed.  STEPHEN FLYNN M.D., Attending Radiologist  This document has been electronically signed. Sep 30 2022  7:23PM    < end of copied text >

## 2022-11-09 NOTE — REASON FOR VISIT
[Consultation] : a consultation visit [Home] : at home, [unfilled] , at the time of the visit. [Medical Office: ([facility])___] : at the medical office located in  [Patient] : the patient [Self] : self [Other:____] : [unfilled] [Other: _____] : [unfilled] [FreeTextEntry1] : RUL lung mass marking

## 2022-11-09 NOTE — ASSESSMENT
[FreeTextEntry1] : the patient appears markedly dehydrated. Blood pressure is down and she is running a sinus tachycardia. CAT scan shows diffuse groundglass opacities suggesting possibility of an infectious etiology in addition to her underlying lymphoma. Her lung function showed normal lungs lungs mechanics but her diffusing capacity is markedly diminished likely related to the pulmonary infiltrates and her anemia. She does look paler than her current hemoglobin.\par She is to be admitted to the hospital where she will need to be hydrated. Her resting oxygen saturation appears satisfactory but it is highly likely that she desaturates on minimal exertion.\par The patient is scheduled for surgery to establish a diagnosis.

## 2022-11-09 NOTE — H&P ADULT - ASSESSMENT
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 Patient is admitted to be optimized for her surgical procedure.    Plan: OR tomorrow for RVATS, RUL Nodule Biopsy w/ IR Marking.    Gen: Will consult medicine w/ Dr. Barr.  Neuro: Pain management  Pulm: Encourage coughing, deep breathing and use of incentive spirometry. Daily CXR.   Cardio: Monitor telemetry/alarms  GI: Tolerating diet. Continue stool softeners.  Renal: monitor urine output, supplement electrolytes as needed  Vasc: Heparin SC/SCDs for DVT prophylaxis  Heme: Heme/Onc on board, appreciate recs. Will consult Rheum.   ID: Off antibiotics. Stable.  Therapy: OOB/ambulate  Disposition: OR tomorrow for RVATS, RUL Nodule Biopsy  Discussed with Cardiothoracic Team at AM rounds.

## 2022-11-09 NOTE — CONSULT NOTE ADULT - ATTENDING COMMENTS
as above    Dr. Jennifer Rey  Rheumatology Attending  [phone]  [email or web address]

## 2022-11-09 NOTE — CONSULT NOTE ADULT - NSCONSULTADDITIONALINFOA_GEN_ALL_CORE
d/w pt and the son at bedside.  message left with Card Dr. Cooper   d/w karissa Johns.  d/w Dr. Pettit.    - Dr. MARY Arias ([facility])  - [phone]

## 2022-11-09 NOTE — PATIENT PROFILE ADULT - FALL HARM RISK - HARM RISK INTERVENTIONS

## 2022-11-09 NOTE — HISTORY OF PRESENT ILLNESS
[TextBox_4] : 81F with hx of ANCA vasculiti, Renay Barr Virus, GERD, Lymphoma.\par In 2021 she presented to the ED with chest pain and CTA showed PE with bilateral pulmonary masses.  \par She is s/p Flex Bronch, uniportal left VATS, wedge resection of left upper lobe x1, intercostal nerve block. on 4/16/21. 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 Zonia Madrigal, started chemo on 05/19/2021, s/p 6 cycles completed on 09/09/2021. \par \par In September 2022 hospitalized for shortness of breath and dry cough. CTA shoed worsening GGOs and mediastinal LAD concerning ofr recurrence of lymphoma. Stable LUIS mass 1.4cm compatred to prior scan. \par She is s/'p EBUS guided FNA of level 7LN that showed lymphoplasmacytic cells present. Heterogeneous lymphoid cells are seen.  The overall findings are not diagnostic of lymphoproliferative disorder/lymphoma, clinical correlation is necessary. \par Patient then had the PET/CT scan done on 10/21/22 and was seen by Dr. Pettit for consult and plan is for patient to undergo Bronch, Right VATS, lung resection with IR marking of right upper lobe nodule Nodule; Mediastinal lymph node dissection. \par \par She was evaluated by IR on 11/3/22. \par . Patient underwent pulmonary function testing. She is complaining of lightheadedness and dyspnea on minimal exertion. Her appetite is markedly diminished. Weight recorded at 122 pounds. Denies any pain or fever

## 2022-11-09 NOTE — PHYSICAL THERAPY INITIAL EVALUATION ADULT - PATIENT PROFILE REVIEW, REHAB EVAL
ACTIVITY: Ambulate with Assistance; Spoke with DEEPA Vital prior to PT evaluation--> Pt OK for PT consult/OOB activity./yes

## 2022-11-09 NOTE — CONSULT NOTE ADULT - SUBJECTIVE AND OBJECTIVE BOX
Patient is a 81y old  Female who presents with a chief complaint of RVATS, RUL Nodule Biopsy w/ IR marking (09 Nov 2022 12:31)      HPI:  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), s/p LVATS, LUIS wedge resection (2021) direct admit for RVATS, RUL Nodule Biopsy w/ IR marking. Patient states that recently she has been feeling very fatigued. She states that moving around her house, or even to the bathroom, has been causing her to get very tired and causes her some minor chest pain, which resolves quickly with rest. She states that her breathing has been non-labored, denies dyspnea, shortness of breath, inability to catch her breath. She states that she has also not had much of an appetite over the last month and has been eating mostly soft foods. She admits to about a 5lb weight loss over the last month. She admits to a minor cough that occasionally occurs in fits and makes her feel as if she may vomit. Patient admits to some nausea and aversion to food but denies mechanical issues, inability to eat or feeling of choking. Denies hematemesis, hemoptysis.   (09 Nov 2022 12:31)      PAST MEDICAL & SURGICAL HISTORY:  ANCA-associated vasculitis  Anxiety and depression  Pulmonary embolism 2021  DVT, lower extremity 2021  GERD (gastroesophageal reflux disease)  Hyperlipidemia  Lung mass s/p left wedge resection  DLBCL (diffuse large B cell lymphoma)    History of appendectomy    Lung nodule  pt had wedge resection in 2021    Non-Hodgkins lymphoma  chemoport inserted in 2021      FAMILY HISTORY: Noncontributory   FH: lung cancer (Sibling)    FH: CAD (coronary artery disease) (Sibling)        SOCIAL HISTORY: Denied smoking, EtOH history. Denied illicit drug use.     Allergies:   codeine (Nausea)  doxycycline (Nausea)  penicillin (Hives)    Intolerances      REVIEW OF SYSTEMS:  General: +fatigue/ weakness, no fever/chills, no weight loss/gain  Skin/Breast: no rash, no jaundice  Ophthalmologic: no vision changes, no dry eyes   Respiratory and Thorax: no cough, no wheezing, no hemoptysis, no dyspnea  Cardiovascular: no chest pain, no shortness of breath, no orthopnea  Gastrointestinal: no n/v/d, no abdominal pain, no dysphagia   Genitourinary: no dysuria, no frequency, no nocturia, no hematuria  Musculoskeletal: no trauma, no sprain/strain, no myalgias, no arthralgias, no fracture  Neurological: no HA, no dizziness, no weakness, no numbness  Psychiatric: no depression, no SI/HI  Hematology/Lymphatics: no easy bruising  Endocrine: no heat or cold intolerance. no weight gain or loss  Allergic/Immunologic: no allergy or recent reaction       Vital Signs Last 24 Hrs  T(C): 36.6 (09 Nov 2022 12:54), Max: 36.6 (09 Nov 2022 12:54)  T(F): 97.9 (09 Nov 2022 12:54), Max: 97.9 (09 Nov 2022 12:54)  HR: 92 (09 Nov 2022 12:54) (92 - 92)  BP: 131/41 (09 Nov 2022 12:54) (131/41 - 131/41)  BP(mean): --  RR: 18 (09 Nov 2022 12:54) (18 - 18)  SpO2: 97% (09 Nov 2022 12:54) (97% - 97%)      PHYSICAL EXAM:  GENERAL: NAD, thin-elderly, comfortable  HEAD:  Atraumatic, Normocephalic  EYES: EOMI, PERRLA, conjunctiva and sclera clear  NECK: Supple, No JVD  CHEST/LUNG: mild decrease breath sounds bilaterally; No wheeze   HEART: Regular rate and rhythm; No murmurs, rubs, or gallops  ABDOMEN: Soft, Nontender, Nondistended; Bowel sounds present  Neuro: AAOx3, no focal weakness   EXTREMITIES:  2+ Peripheral Pulses, No clubbing, cyanosis, or edema  SKIN: No rashes or lesions     I&O's Summary         LABS:                        8.1    18.17 )-----------( 287      ( 09 Nov 2022 12:45 )             26.0     11-09    134<L>  |  97<L>  |  15  ----------------------------<  119<H>  3.4<L>   |  23  |  0.78    Ca    8.0<L>      09 Nov 2022 12:45  Phos  3.1     11-09  Mg     1.70     11-09    TPro  6.8  /  Alb  2.7<L>  /  TBili  0.9  /  DBili  x   /  AST  18  /  ALT  10  /  AlkPhos  79  11-09    PT/INR - ( 09 Nov 2022 14:55 )   PT: 18.6 sec;   INR: 1.60 ratio         PTT - ( 09 Nov 2022 14:55 )  PTT:34.8 sec  CAPILLARY BLOOD GLUCOSE      RADIOLOGY & ADDITIONAL TESTS:    Imaging Personally Reviewed:  [x] YES  [ ] NO    Consultant(s) Notes Reviewed:  [x] YES  [ ] NO      MEDICATIONS  (STANDING):  citalopram 10 milliGRAM(s) Oral daily  fluticasone propionate 50 MICROgram(s)/spray Nasal Spray 1 Spray(s) Both Nostrils two times a day  folic acid 1 milliGRAM(s) Oral daily  heparin   Injectable 5000 Unit(s) SubCutaneous every 12 hours  lactated ringers. 1000 milliLiter(s) (75 mL/Hr) IV Continuous <Continuous>  pantoprazole    Tablet 40 milliGRAM(s) Oral before breakfast  sodium chloride 1 Gram(s) Oral daily    MEDICATIONS  (PRN):      Care Discussed with Consultants/Other Providers [x] YES  [ ] NO

## 2022-11-09 NOTE — H&P ADULT - NS ATTEND AMEND GEN_ALL_CORE FT
for R VATS lung resection with IR markings in am.  Preop will:  1. Medical consult/clearance  2. Rheumatology consult  3. IVF  4. Full set of labs   5. Repeat labs in am  6. NPO after midnight  7. Repeat COVID

## 2022-11-09 NOTE — H&P ADULT - NSHPPHYSICALEXAM_GEN_ALL_CORE
PHYSICAL EXAM:  General: Thin, tired, NAD  Eyes: Vision grossly intact, EOMI  ENMT: Hearing grossly intact. Airway grossly patent, no stridor  Neck: Neck supple, trachea midline  Cardiovascular: RRR, S1S2  Respiratory: Breathing comfortably on RA, no respiratory distress, no accessory muscle use.  Gastrointestinal: Soft, NT, ND, +BS  Extremities: WEINSTEIN x4  Vascular: >2 sec cap refill. Slow turgor  Neurological: Nonfocal, no deficits  Psychiatric: Tired but Appropriate affect

## 2022-11-09 NOTE — REVIEW OF SYSTEMS
[Fatigue] : fatigue [SOB on Exertion] : sob on exertion [Anemia] : anemia [Negative] : Cardiovascular

## 2022-11-09 NOTE — CONSULT NOTE ADULT - ASSESSMENT
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 Patient states that recently she has been feeling very fatigued.    Fatigue/dyspnea, with hx of Lymphoma: will need to r/o recurrent  - Tentative plan for R VATS with RUL lung resection and mediastinal LN dissection  - heme/onc following   - per pt, recently seen by Cardiology Dr. Ady Cooper and was cleared for OR. Recent TTE on 11/3/22 reviewed: normal LV  - PT also saw pulm Mack Tucker in the office, with some concern for her overall status. She was hypotensive to systolic 90s in the office.  (now BP improves s/p IVF here).   - d/w Dr. Pettit, will transfer case under medicine.   - Rheum has also been consulted to r/o vasculitis per heme/onc recommendation.   - clinically she feels okay without chest pain.   - will optimize her medically.     Anemia  - hgb 8.1 lower than her baseline.  - no melena, no hematochezia. no BRBPR.   - recent Anemia work-up reviewed: normal vit b12, folate.  - will repeat iron studies and continue to trend.   - s/p 2 units PRBC 11/1/22 per heme's note.     hx on ANCA+ vasculitis  - no current flair   - rheum eval per heme/onc request given pulm findings on CT chest.     Anxiety and depression  - stable, continue citalopram.  - Pt denied SI/HI ideations, denied visual and auditory hallucinations.     GERD (gastroesophageal reflux disease)  - continue PPI    Hyperlipidemia.   - continue home ezetimibe. okay to hold if nonformulary   - Lipid panel    DVT ppx    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), s/p LVATS, LUIS wedge resection (2021) direct admit for RVATS, RUL Nodule Biopsy w/ IR marking. Patient states that recently she has been feeling very fatigued.    Fatigue/dyspnea, with hx of Lymphoma: will need to r/o recurrent  - Tentative plan for R VATS with RUL lung resection and mediastinal LN dissection  - heme/onc following   - per pt, recently seen by Cardiology Dr. Ady Cooper and was cleared for OR. Recent TTE on 11/3/22 reviewed: normal LV  - PT also saw pulm Mack Tucker in the office, with some concern for her overall status. She was hypotensive to systolic 90s in the office.  (now BP improves s/p IVF here).   - d/w Dr. Pettit, will transfer case under medicine.   - Rheum has also been consulted to r/o vasculitis per heme/onc recommendation.   - clinically she feels okay without chest pain.   - will optimize her medically.     Anemia  - hgb 8.1 lower than her baseline.  - no melena, no hematochezia. no BRBPR.   - recent Anemia work-up reviewed: normal vit b12, folate.  - will repeat iron studies and continue to trend.   - s/p 2 units PRBC 11/1/22 per heme's note.   - plan to give 1 unit prbc today to optimize.     hx on ANCA+ vasculitis  - no current flair   - rheum eval per heme/onc request given pulm findings on CT chest.     Anxiety and depression  - stable, continue citalopram.  - Pt denied SI/HI ideations, denied visual and auditory hallucinations.     GERD (gastroesophageal reflux disease)  - continue PPI    Hyperlipidemia.   - continue home ezetimibe. okay to hold if nonformulary   - Lipid panel    DVT ppx    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), s/p LVATS, LUIS wedge resection (2021) direct admit for RVATS, RUL Nodule Biopsy w/ IR marking. Patient states that recently she has been feeling very fatigued.    Fatigue/dyspnea, with hx of Lymphoma: will need to r/o recurrent  - Tentative plan for R VATS with RUL lung resection and mediastinal LN dissection  - heme/onc following   - per pt, recently seen by Cardiology Dr. Ady Cooper and was cleared for OR. Recent TTE on 11/3/22 reviewed: normal LV  - PT also saw pulm Mack Tucker in the office, with some concern for her overall status. She was hypotensive to systolic 90s in the office.  (now BP improves s/p IVF here).   - d/w Dr. Pettit, will transfer case under medicine.   - Rheum has also been consulted to r/o vasculitis per heme/onc recommendation.   - clinically she feels okay without chest pain.   - will optimize her medically.     Anemia  - hgb 8.1 lower than her baseline.  - no melena, no hematochezia. no BRBPR.   - recent Anemia work-up reviewed: normal vit b12, folate.  - will repeat iron studies and continue to trend.   - s/p 2 units PRBC 11/1/22 per heme's note.   - plan to give 1 unit prbc today to optimize (with IVF, anticipate downtrend).     Leukocytosis  - check CXR, UA. (no urinary symptoms)  - will repeat post transfusion hgb.     hx on ANCA+ vasculitis  - no current flair   - rheum eval per heme/onc request given pulm findings on CT chest.     Anxiety and depression  - stable, continue citalopram.  - Pt denied SI/HI ideations, denied visual and auditory hallucinations.     GERD (gastroesophageal reflux disease)  - continue PPI    Hyperlipidemia.   - continue home ezetimibe. okay to hold if nonformulary   - Lipid panel    DVT ppx

## 2022-11-09 NOTE — CONSULT NOTE ADULT - ASSESSMENT
#DLBCL  - plan for biopsy today by CT Sx  - please send EBV PCR   - monitor CBC with diff and TLS labs daily (LDH, uric acid, K, Phos, Ca)     #ANCA Vasculitis  - azathioprine has been on hold  - given GGOs and PET/CT findings, off azathioprine please have Rheumatology evaluate patient    NOTE INCOMPLETE     Vincent Novak MD, PGY6  Hematology/Oncology Fellow   pager [phone]  After 5pm and on weekends page on call fellow  81 year old female with PMH of EBV+ DLBCL s/p 6 cycles of R-CHOP (miniCHOP last 2 cycles) with Deauville 3 response on surveillance with suspicion of recurrence, depression, B/L DVTs and PE dx 9/2021 s/p 1 year of Eliquis now off ac, and vasculitis (approx 20 years ago, off azathioprine since DLBCL diagnosis) presenting for R VATS with RUL lung resection and mediastinal LN dissection on 11/10/22. Patient has been having worsening sob and fatigue along with recent PET/CT concerning for lymphoma recurrence.     #EBV+ DLBCL  - dx 2021 s/p 4 cycles R-CHOP c/b fatigue, neutropenia and thrombocytopenia, last few cycles R-miniCHOP (completed 6 cycles total on 9/9/21 with EOT PET/CT LUIS nodule decrease in size and resolution of RUL nodule: Deauville 3. On surveillance imaging and found to have worsening symptoms of fatigue and sob with hospitalization 9/2022 CTA chest showed worsening GGOs and mediastinal LAD (i.e. 2 x 1.2 --> 2.4 x 1.8 cm), concerning for recurrence of her lymphoma. Stable residual LUIS mass (1.4 x 1 cm) compared to last CT. s/p EBUS guided FNA which revealed lymphoplasmacytic cells present but not diagnostic of recurrent DLBCL   -PET/CT for restaging on 10/21/22: 1. Diffuse patchy mildly FDG-avid bilateral groundglass pulmonary opacities are new as compared to prior PET/CT, and appear increased since 9/30/2022, however, comparison is limited due to differences in CT technique. An FDG-avid right upper lobe pulmonary nodule is new as compared to prior PET/CT, and increased in size and density as compared to CT dated 9/30/2022 (SUV 9.5). Findings are concerning for progression of lymphoma. Correlate clinically to exclude infection. An irregular left upper lobe pulmonary nodule containing small focus of increased FDG activity is unchanged as compared to CT dated 9/30/2022, and is decreased in size and metabolism as compared to prior PET/CT. 2. Multiple nonspecific FDG-avid mildly enlarged mediastinal and bilateral hilar lymph nodes are increased in size, number, and metabolism. Differential diagnosis includes recurrent lymphoma.3. New nonspecific difuse splenic and bone marrow hypermetabolism may be secondary to lymphoma.4. Resolution of FDG-avid left maxillary sinus mucosal thickening. New non-FDG-avid minimal right maxillary sinus mucosal thickening.  - noted to moose anemic to 7.2 Hgb 10/29/22 s/p 2 units PRBC 11/1/22   - Dr. Pettit - planning for surgery on 11/9/22  - Bone marrow bx 11/1/22 negative   - plan for flex bronch, right VATS RUL lung resection and mediastinal LN dissection on 11/10 with Dr. Pettit   - please send EBV PCR   - monitor CBC with diff and TLS labs daily (LDH, uric acid, K, Phos, Ca)     #ANCA Vasculitis  - azathioprine has been on hold  - given GGOs and PET/CT findings, off azathioprine please have Rheumatology evaluate patient      Vincent Novak MD, PGY6  Hematology/Oncology Fellow   pager [phone]  After 5pm and on weekends page on call fellow

## 2022-11-09 NOTE — CONSULT NOTE ADULT - ASSESSMENT
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), s/p LVATS, LUIS wedge resection (2021) presents for R VATS with RUL resection and mediastinal LN dissection on 11/10 with concern for lymphoma recurrence. Patient with hx of ANCA Vasculitis but has been off maintenance therapy for 2 years. Differential diagnosis includes Lymphoma vs. ANCA Vasculitis vs. Infection (less likely).    Plan  - send ANCA, MPO, PR3, Urinalysis, Urine Pr/Cr ratio (ordered)  - will follow-up results of lung biopsy  - send Hep B status, Quant TB, Full T cell subsets for preparation for possibility of ANCA Vasculitis pathology (ordered)    Discussed with Dr. Rey, Attending    Jesus Ritter MD  Rheumatology Fellow, PGY-5

## 2022-11-09 NOTE — CONSULT NOTE ADULT - SUBJECTIVE AND OBJECTIVE BOX
HPI:  81 year old female with PMH of  HDL, depression recently diagnosed with Right PE and B/l LE DVT on Eliquis discontinued 6 months ago, received chemo via chemoport, last chemo Sep 2021.  Pt also found to have left lung mass on CT scan. S/P Left VATs and left lobe wedge resection on 4/16/21.   Pt now found to have a solitary right upper lobe pulmonary nodule. pt presents for preop eval to have flexible bronchoscopy right VATS, lung resection, with interventional radiology marking of right upper lobe nodule, and mediastinal lymph node dissection on 11/10/22. (04 Nov 2022 15:02)      PAST MEDICAL & SURGICAL HISTORY:  ANCA-associated vasculitis      Anxiety and depression      Pulmonary embolism  2021      DVT, lower extremity  2021      GERD (gastroesophageal reflux disease)      Hyperlipidemia      Lung mass  s/p left wedge resection      DLBCL (diffuse large B cell lymphoma)      History of appendectomy      Lung nodule  pt had wedge resection in 2021      Non-Hodgkins lymphoma  chemoport inserted in 2021          Allergies    codeine (Nausea)  doxycycline (Nausea)  penicillin (Hives)    Intolerances        MEDICATIONS  (STANDING):    MEDICATIONS  (PRN):      FAMILY HISTORY:  FH: lung cancer (Sibling)    FH: CAD (coronary artery disease) (Sibling)        SOCIAL HISTORY: No EtOH, no tobacco    REVIEW OF SYSTEMS:    CONSTITUTIONAL: No weakness, fevers or chills  EYES/ENT: No visual changes;  No vertigo or throat pain   NECK: No pain or stiffness  RESPIRATORY: No cough, wheezing, hemoptysis; No shortness of breath  CARDIOVASCULAR: No chest pain or palpitations  GASTROINTESTINAL: No abdominal or epigastric pain. No nausea, vomiting, or hematemesis; No diarrhea or constipation. No melena or hematochezia.  GENITOURINARY: No dysuria, frequency or hematuria  NEUROLOGICAL: No numbness or weakness  SKIN: No itching, burning, rashes, or lesions   All other review of systems is negative unless indicated above.        T(F): --  HR: --  BP: --  RR: --  SpO2: --  Wt(kg): --    GENERAL: NAD, well-developed  HEAD:  Atraumatic, Normocephalic  EYES: EOMI, PERRLA, conjunctiva and sclera clear  NECK: Supple, No JVD  CHEST/LUNG: Clear to auscultation bilaterally; No wheeze  HEART: Regular rate and rhythm; No murmurs, rubs, or gallops  ABDOMEN: Soft, Nontender, Nondistended; Bowel sounds present  EXTREMITIES:  2+ Peripheral Pulses, No clubbing, cyanosis, or edema  NEUROLOGY: non-focal  SKIN: No rashes or lesions                        Clean Catch Clean Catch (Midstream)  09-30 @ 17:53   <10,000 CFU/mL Normal Urogenital [place]  --  --       HPI:  81 year old female with PMH of EBV+ DLBCL s/p 6 cycles of R-CHOP (miniCHOP last 2 cycles) with Deauville 3 response on surveillance with suspicion of recurrence, depression, B/L DVTs and PE dx 9/2021 s/p 1 year of Eliquis now off ac, and vasculitis (approx 20 years ago, off azathioprine since DLBCL diagnosis) presenting for R VATS with RUL lung resection and mediastinal LN dissection on 11/10/22. Patient has been having worsening sob and fatigue along with recent PET/CT concerning for lymphoma recurrence.     Hematologic History  - 2021 presented with fatigue and shortness of breath. ER visit on 4/13/21 CT angio done which showed large b/l lung masses measuring up to 8.6cm, right lower lobe and middle lobe pulmonary arterial emboli with b/l LE DVTs, s/p Eliquis for one year now off.   - s/p biopsy/VATs on 4/16/21, path showed EBV+ diffuse large B-cell lymphoma. Lymphoma cells are B cells, positive VEENA, CD20, CD79a, Pax5 (dim), Mum-1, Bcl2, CD43,variable CD30, negative CD5, CD10, CD23, cyclin D1, p53 (<5%), cMYC (<20%). Ki-67 (>90%). CD3+ T cells comprise a minor population in the infiltrate.  stains few plasma cells; B cells are lambda-restricted. Cam 5.2, P40, TTF-1, synaptophysin, chromogranin, PAX-8 and MALU-3 were performed on block 2C. They are negative in lymphoma cells.  - s/p Cycle 1 of R-CHOP on 5/20/21 while admitted to monitor her respiratory function. s/p LP with IT MTX which did not demonstrate involvement with lymphoma. C2-C3 complicated by fatigue. C4 c/b neutropenia and profound fatigued. S/p fall at home found to have ankle fracture with cytopenias including plt count 10K discharged to rehab. C5 of mini-R-CHOP given her prior complications which was overall well tolerated and completed C6 on 9/9/21.   - EOT PET/CT done 9/27/21 shows Interval further decrease in size and FDG avidity of left upper lung nodule (Deauville 3) and resolution of mildly FDG avid right upper lobe nodule.  - surveillance scans stable until PET/CT 9/2022  - Hospitalized at [facility] on 9/30/22 for 2 weeks of SOB on exertion, nonproductive cough. CTA chest showed worsening GGOs and mediastinal LAD (i.e. 2 x 1.2 --> 2.4 x 1.8 cm), concerning for recurrence of her lymphoma. Stable residual LUIS mass (1.4 x 1 cm) compared to last CT. s/p EBUS guided FNA which revealed lymphoplasmacytic cells present but not diagnostic of recurrent DLBCL   -PET/CT for restaging on 10/21/22: 1. Diffuse patchy mildly FDG-avid bilateral groundglass pulmonary opacities are new as compared to prior PET/CT, and appear increased since 9/30/2022, however, comparison is limited due to differences in CT technique. An FDG-avid right upper lobe pulmonary nodule is new as compared to prior PET/CT, and increased in size and density as compared to CT dated 9/30/2022 (SUV 9.5). Findings are concerning for progression of lymphoma. Correlate clinically to exclude infection. An irregular left upper lobe pulmonary nodule containing small focus of increased FDG activity is unchanged as compared to CT dated 9/30/2022, and is decreased in size and metabolism as compared to prior PET/CT. 2. Multiple nonspecific FDG-avid mildly enlarged mediastinal and bilateral hilar lymph nodes are increased in size, number, and metabolism. Differential diagnosis includes recurrent lymphoma.3. New nonspecific difuse splenic and bone marrow hypermetabolism may be secondary to lymphoma.4. Resolution of FDG-avid left maxillary sinus mucosal thickening. New non-FDG-avid minimal right maxillary sinus mucosal thickening.  - noted to moose anemic to 7.2 Hgb 10/29/22 s/p 2 units PRBC 11/1/22   - Dr. Pettit - planning for surgery on 11/9/22  - Bone marrow bx 11/1/22 negative     PAST MEDICAL & SURGICAL HISTORY:  ANCA-associated vasculitis      Anxiety and depression      Pulmonary embolism  2021      DVT, lower extremity  2021      GERD (gastroesophageal reflux disease)      Hyperlipidemia      Lung mass  s/p left wedge resection      DLBCL (diffuse large B cell lymphoma)      History of appendectomy      Lung nodule  pt had wedge resection in 2021      Non-Hodgkins lymphoma  chemoport inserted in 2021          Allergies    codeine (Nausea)  doxycycline (Nausea)  penicillin (Hives)    Intolerances        MEDICATIONS  (STANDING):    MEDICATIONS  (PRN):      FAMILY HISTORY:  FH: lung cancer (Sibling)    FH: CAD (coronary artery disease) (Sibling)        SOCIAL HISTORY: No EtOH, no tobacco    REVIEW OF SYSTEMS:    CONSTITUTIONAL: No weakness, fevers or chills  EYES/ENT: No visual changes;  No vertigo or throat pain   NECK: No pain or stiffness  RESPIRATORY: No cough, wheezing, hemoptysis; No shortness of breath  CARDIOVASCULAR: No chest pain or palpitations  GASTROINTESTINAL: No abdominal or epigastric pain. No nausea, vomiting, or hematemesis; No diarrhea or constipation. No melena or hematochezia.  GENITOURINARY: No dysuria, frequency or hematuria  NEUROLOGICAL: No numbness or weakness  SKIN: No itching, burning, rashes, or lesions   All other review of systems is negative unless indicated above.        T(F): --  HR: --  BP: --  RR: --  SpO2: --  Wt(kg): --    GENERAL: NAD, well-developed  HEAD:  Atraumatic, Normocephalic  EYES: EOMI, PERRLA, conjunctiva and sclera clear  NECK: Supple, No JVD  CHEST/LUNG: Clear to auscultation bilaterally; No wheeze  HEART: Regular rate and rhythm; No murmurs, rubs, or gallops  ABDOMEN: Soft, Nontender, Nondistended; Bowel sounds present  EXTREMITIES:  2+ Peripheral Pulses, No clubbing, cyanosis, or edema  NEUROLOGY: non-focal  SKIN: No rashes or lesions                        Clean Catch Clean Catch (Midstream)  09-30 @ 17:53   <10,000 CFU/mL Normal Urogenital [place]  --  --

## 2022-11-09 NOTE — CONSULT NOTE ADULT - ATTENDING COMMENTS
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 Patient has been having worsening sob and fatigue along with recent PET/CT concerning for lymphoma recurrence. PET/CT for restaging on 10/21/22 shows new diffuse patchy mildly FDG-avid bilateral groundglass pulmonary opacities, a new FDG-avid right upper lobe pulmonary nodule, and multiple nonspecific FDG-avid mildly enlarged mediastinal and bilateral hilar lymph nodes are increased in size, number, and metabolism. Bone marrow bx 11/1/22 negative. Plan for flex bronch, right VATS RUL lung resection and mediastinal LN dissection on 11/10 with Dr. Pettit. She also has a history of ANCA vasculitis with azathioprine on hold since receiving chemotherapy. Given GGOs and PET/CT findings, off azathioprine would have Rheumatology evaluate patient.

## 2022-11-09 NOTE — HISTORY OF PRESENT ILLNESS
[FreeTextEntry1] :  Patient is a 81 year old female, non smoker, w/ hx of HTN, ANCA vasculitis (dx 20 years ago, been on Azathioprine since then, being followed by Rheum) who presented to ED on 4/13/21 w/ complaints of chest pain. CTA she was found to have a pulmonary embolus & bilateral pulmonary masses & DVTs. She was started on anticoagulation. -s/p Flex Bronch, uniportal left VATS, wedge resection of left upper lobe x1, intercostal nerve block. on 4/16/21. 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 Zonia Madrigal, started chemo on 05/19/2021, s/p 6 cycles completed on 09/09/2021. \par Patient was hospitalized Sept. 2022 for SOB on exertion and nonproductive cough. CTA of chest showed worsening GGOs and mediastinal LAD concerning for recurrence of her lymphoma. Stable residual LUIS mass (1.4 x 1 cm) compared to last CT. \par s/p EBUS guided FNA of Level 7 LN, which revealed lymphoplasmacytic cells present. Heterogeneous lymphoid cells are seen. The overall findings are not diagnostic of lymphoproliferative disorder/lymphoma, clinical correlation is necessary. \par Patient then had the PET/CT scan done on 10/21/22 and was seen by Dr. Pettit for consult and plan is for patient to undergo Bronch, Right VATS, lung resection with IR marking of right upper lobe nodule Nodule; Mediastinal lymph node dissection. \par \par Patient is now being referred for consultation to discuss RUL lung nodule marking. \par \par Patient endorses coughing \par \par Denies any recent SOB, CP, fever, chills, n/v/d.\par

## 2022-11-09 NOTE — PHYSICAL THERAPY INITIAL EVALUATION ADULT - PERTINENT HX OF CURRENT PROBLEM, REHAB EVAL
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), s/p Left VATS, Left Upper Lobe wedge resection (2021) direct admit for Right VATS, Right Upper Lobe Nodule Biopsy w/ IR marking on 11/10. Patient is admitted to be optimized for her surgical procedure.

## 2022-11-09 NOTE — H&P ADULT - NSVTERISKREFERASSESS_GEN_ALL_CORE
Refer to the Assessment tab to view/cancel completed assessment.
14-Sep-2018 16:24
Clear bilaterally, pupils equal, round and reactive to light.

## 2022-11-09 NOTE — H&P ADULT - HISTORY OF PRESENT ILLNESS
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 Patient states that recently she has been feeling very fatigued. She states that moving around her house, or even to the bathroom, has been causing her to get very tired and causes her some minor chest pain, which resolves quickly with rest. She states that her breathing has been non-labored, denies dyspnea, shortness of breath, inability to catch her breath. She states that she has also not had much of an appetite over the last month and has been eating mostly soft foods. She admits to about a 5lb weight loss over the last month. She admits to a minor cough that occasionally occurs in fits and makes her feel as if she may vomit. Patient admits to some nausea and aversion to food but denies mechanical issues, inability to eat or feeling of choking. Denies hematemesis, hemoptysis.

## 2022-11-09 NOTE — PHYSICAL THERAPY INITIAL EVALUATION ADULT - ADDITIONAL COMMENTS
Pt reports that she lives alone in a private house with ~5 steps to enter; (+)bilateral handrails; bedroom/bathroom is on the first floor. Prior to hospital admission pt was ambulating independently using a rolling walker. Pt's son visits her often and her friends come over to help her with bathing and dressing. Pt denies any recent falls.    Pt left comfortable in bed, NAD, all lines intact, all precautions maintained, with call bell in reach, bed alarm, and RN aware.
Admission

## 2022-11-09 NOTE — CONSULT NOTE ADULT - SUBJECTIVE AND OBJECTIVE BOX
BETTIE DE LA ROSAPutney  [id number]    HISTORY OF PRESENT ILLNESS:  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), s/p LVATS, LUIS wedge resection (2021) direct admit for RVATS, RUL Nodule Biopsy w/ IR marking. Patient states that recently she has been feeling very fatigued. She states that moving around her house, or even to the bathroom, has been causing her to get very tired and causes her some minor chest pain, which resolves quickly with rest. She states that her breathing has been non-labored, denies dyspnea, shortness of breath, inability to catch her breath. She states that she has also not had much of an appetite over the last month and has been eating mostly soft foods. She admits to about a 5lb weight loss over the last month. She admits to a minor cough that occasionally occurs in fits and makes her feel as if she may vomit. Patient admits to some nausea and aversion to food but denies mechanical issues, inability to eat or feeling of choking. Denies hematemesis, hemoptysis. (11/9/22 12:31)    Rheumatology consulted due to hx of ANCA Vasculitis. Currently not on treatment for it. Hx as per above. Patient with constitutional symptoms and respiratory symptoms as per above. Denies headache, ocular symptoms (except one momentary episode of right eye pain in the past month), chest pain, abdominal pain, diarrhea, urinary symptoms. Denies rashes, numbness and tingling in the extremities, hemoptysis, sinus and hearing issues. Patient was also seen in early 2021 by Rheumatology at the time when the Lymphoma diagnosis was made. Patient's own recollection of her ANCA vasculitis is having stiff neck and leg weakness. Per last Rheumatology note in 2021.   GPA history:  Follow with Dr. Kasandra Plaza at [facility]  +pANCA, MPO -- dx 2013 (neuropathy, LCV, sinusitis, kidney disease)  CT Chest 2013 - minimal interstitial lung disease  s/p steroids and RTX in 2013, went into remission shortly after and was then placed on AZA as maintenance tx since ~2014  Last seen for follow up 10/2020 - UA bland, CBC nl, sCr nl, MPO 3.4, PR3 negative (always).    PAST MEDICAL & SURGICAL HISTORY:  ANCA-associated vasculitis      Anxiety and depression      Pulmonary embolism  2021      DVT, lower extremity  2021      GERD (gastroesophageal reflux disease)      Hyperlipidemia      Lung mass  s/p left wedge resection      DLBCL (diffuse large B cell lymphoma)      History of appendectomy      Lung nodule  pt had wedge resection in 2021      Non-Hodgkins lymphoma  chemoport inserted in 2021          Review of Systems:  Gen:  Positive for subjective fevers and weight loss  HEENT: No blurry vision, no difficulty swallowing  CVS: No chest pain/palpitations  Resp: Positive for SOB  GI: Positive for poor appetite  MSK: No joint pain  Skin: No new rashes  Neuro: No headaches    MEDICATIONS  (STANDING):  citalopram 10 milliGRAM(s) Oral daily  fluticasone propionate 50 MICROgram(s)/spray Nasal Spray 1 Spray(s) Both Nostrils two times a day  folic acid 1 milliGRAM(s) Oral daily  heparin   Injectable 5000 Unit(s) SubCutaneous every 12 hours  lactated ringers. 1000 milliLiter(s) (75 mL/Hr) IV Continuous <Continuous>  pantoprazole    Tablet 40 milliGRAM(s) Oral before breakfast  potassium chloride    Tablet ER 40 milliEquivalent(s) Oral once  sodium chloride 1 Gram(s) Oral daily    MEDICATIONS  (PRN):      Allergies    codeine (Nausea)  doxycycline (Nausea)  penicillin (Hives)    Intolerances        PERTINENT MEDICATION HISTORY:    SOCIAL HISTORY: Denies toxic habits      FAMILY HISTORY:  FH: lung cancer (Sibling)    FH: CAD (coronary artery disease) (Sibling)        Vital Signs Last 24 Hrs  T(C): 36.4 (09 Nov 2022 17:29), Max: 36.6 (09 Nov 2022 12:54)  T(F): 97.6 (09 Nov 2022 17:29), Max: 97.9 (09 Nov 2022 12:54)  HR: 94 (09 Nov 2022 17:29) (92 - 94)  BP: 111/59 (09 Nov 2022 17:29) (111/59 - 131/41)  BP(mean): --  RR: 18 (09 Nov 2022 17:29) (18 - 18)  SpO2: 100% (09 Nov 2022 17:29) (97% - 100%)    Parameters below as of 09 Nov 2022 17:29  Patient On (Oxygen Delivery Method): room air        Daily     Daily     Physical Exam:  General: No apparent distress  HEENT: EOMI, MMM, Protruding nodular lesions from the top of her head (described in CT Head in 2021)  CVS: +S1/S2, RRR  Resp: Right sided crackles  GI: Soft, NT/ND  MSK: No synovitis or effusions; Heberden nodes present  Neuro: AAOx3  muscle strength: 5/5 except in proximal lower extremities where she is 4/5  Skin: no  rashes    LABS:                        8.1    18.17 )-----------( 287      ( 09 Nov 2022 12:45 )             26.0     11-09    134<L>  |  97<L>  |  15  ----------------------------<  119<H>  3.4<L>   |  23  |  0.78    Ca    8.0<L>      09 Nov 2022 12:45  Phos  3.1     11-09  Mg     1.70     11-09    TPro  6.8  /  Alb  2.7<L>  /  TBili  0.9  /  DBili  x   /  AST  18  /  ALT  10  /  AlkPhos  79  11-09    PT/INR - ( 09 Nov 2022 14:55 )   PT: 18.6 sec;   INR: 1.60 ratio         PTT - ( 09 Nov 2022 14:55 )  PTT:34.8 sec      RADIOLOGY & ADDITIONAL STUDIES:  < from: CT Angio Chest PE Protocol w/ IV Cont (09.30.22 @ 18:25) >  IMPRESSION:  No pulmonary embolism.    New patchy groundglass opacities since 8/6/2022, likely   infectious/inflammatory    Increased mediastinal lymphadenopathy.    Unchanged left upper lobe nodule since the prior study and decreased   since more remote studies.    < end of copied text >  < from: NM PET/CT Onc FDG Skull to Thigh, Subsq (10.21.22 @ 17:32) >  IMPRESSION: Compared to FDG-PET/CT scan dated 9/27/2021:    1. Diffuse patchy mildly FDG-avid bilateral groundglass pulmonary   opacities are new as compared to prior PET/CT, and appear increased since   9/30/2022, however, comparison is limited due to differences in CT   technique. An FDG-avid right upper lobe pulmonary nodule is new as   compared to prior PET/CT, and increased in size and density as compared   to CT dated 9/30/2022 (SUV 9.5). Findings are concerning for progression   of lymphoma. Correlate clinically to exclude infection. An irregular left   upper lobe pulmonary nodule containing small focus of increased FDG   activity is unchanged as compared to CT dated 9/30/2022, and is decreased   in size and metabolism as compared to prior PET/CT.    2. Multiple nonspecific FDG-avid mildly enlarged mediastinal and   bilateralhilar lymph nodes are increased in size, number, and   metabolism. Differential diagnosis includes recurrent lymphoma.    3. New nonspecific diffuse splenic and bone marrow hypermetabolism may be   secondary to lymphoma.    4. Resolution of FDG-avid left maxillary sinus mucosal thickening. New   non-FDG-avid minimal right maxillary sinus mucosal thickening.    Findings emailed to Dr. Zonia Madrigal.    < end of copied text >   BETTIE DE LA ROSAMancos  [id number]    HISTORY OF PRESENT ILLNESS:  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), s/p LVATS, LUIS wedge resection (2021) direct admit for RVATS, RUL Nodule Biopsy w/ IR marking. Patient states that recently she has been feeling very fatigued. She states that moving around her house, or even to the bathroom, has been causing her to get very tired and causes her some minor chest pain, which resolves quickly with rest. She states that her breathing has been non-labored, denies dyspnea, shortness of breath, inability to catch her breath. She states that she has also not had much of an appetite over the last month and has been eating mostly soft foods. She admits to about a 5lb weight loss over the last month. She admits to a minor cough that occasionally occurs in fits and makes her feel as if she may vomit. Patient admits to some nausea and aversion to food but denies mechanical issues, inability to eat or feeling of choking. Denies hematemesis, hemoptysis. (11/9/22 12:31)    Rheumatology consulted due to hx of ANCA Vasculitis. Currently not on treatment for it. Hx as per above. Patient with constitutional symptoms and respiratory symptoms as per above. Denies headache, ocular symptoms (except one momentary episode of right eye pain in the past month), chest pain, abdominal pain, diarrhea, urinary symptoms. Denies rashes, numbness and tingling in the extremities, hemoptysis, sinus and hearing issues. Patient was also seen in early 2021 by Rheumatology at the time when the Lymphoma diagnosis was made. Patient's own recollection of her ANCA vasculitis is having stiff neck and leg weakness. Per last Rheumatology note in 2021.     GPA history:  Follow with Dr. Kasandra Plaza at [facility]  +pANCA, MPO -- dx 2013 (neuropathy, LCV, sinusitis, kidney disease)  CT Chest 2013 - minimal interstitial lung disease  s/p steroids and RTX in 2013, went into remission shortly after and was then placed on AZA as maintenance tx since ~2014  Last seen for follow up 10/2020 - UA bland, CBC nl, sCr nl, MPO 3.4, PR3 negative (always).    PAST MEDICAL & SURGICAL HISTORY:  ANCA-associated vasculitis      Anxiety and depression      Pulmonary embolism  2021      DVT, lower extremity  2021      GERD (gastroesophageal reflux disease)      Hyperlipidemia      Lung mass  s/p left wedge resection      DLBCL (diffuse large B cell lymphoma)      History of appendectomy      Lung nodule  pt had wedge resection in 2021      Non-Hodgkins lymphoma  chemoport inserted in 2021          Review of Systems:  Gen:  Positive for subjective fevers and weight loss  HEENT: No blurry vision, no difficulty swallowing  CVS: No chest pain/palpitations  Resp: Positive for SOB  GI: Positive for poor appetite  MSK: No joint pain  Skin: No new rashes  Neuro: No headaches    MEDICATIONS  (STANDING):  citalopram 10 milliGRAM(s) Oral daily  fluticasone propionate 50 MICROgram(s)/spray Nasal Spray 1 Spray(s) Both Nostrils two times a day  folic acid 1 milliGRAM(s) Oral daily  heparin   Injectable 5000 Unit(s) SubCutaneous every 12 hours  lactated ringers. 1000 milliLiter(s) (75 mL/Hr) IV Continuous <Continuous>  pantoprazole    Tablet 40 milliGRAM(s) Oral before breakfast  potassium chloride    Tablet ER 40 milliEquivalent(s) Oral once  sodium chloride 1 Gram(s) Oral daily    MEDICATIONS  (PRN):      Allergies    codeine (Nausea)  doxycycline (Nausea)  penicillin (Hives)    Intolerances        PERTINENT MEDICATION HISTORY:    SOCIAL HISTORY: Denies toxic habits      FAMILY HISTORY:  FH: lung cancer (Sibling)    FH: CAD (coronary artery disease) (Sibling)        Vital Signs Last 24 Hrs  T(C): 36.4 (09 Nov 2022 17:29), Max: 36.6 (09 Nov 2022 12:54)  T(F): 97.6 (09 Nov 2022 17:29), Max: 97.9 (09 Nov 2022 12:54)  HR: 94 (09 Nov 2022 17:29) (92 - 94)  BP: 111/59 (09 Nov 2022 17:29) (111/59 - 131/41)  BP(mean): --  RR: 18 (09 Nov 2022 17:29) (18 - 18)  SpO2: 100% (09 Nov 2022 17:29) (97% - 100%)    Parameters below as of 09 Nov 2022 17:29  Patient On (Oxygen Delivery Method): room air        Daily     Daily     Physical Exam:  General: No apparent distress  HEENT: EOMI, MMM, Protruding nodular lesions from the top of her head (described in CT Head in 2021)  CVS: +S1/S2, RRR  Resp: Right sided crackles  GI: Soft, NT/ND  MSK: No synovitis or effusions; Heberden nodes present  Neuro: AAOx3  muscle strength: 5/5 except in proximal lower extremities where she is 4/5  Skin: no  rashes    LABS:                        8.1    18.17 )-----------( 287      ( 09 Nov 2022 12:45 )             26.0     11-09    134<L>  |  97<L>  |  15  ----------------------------<  119<H>  3.4<L>   |  23  |  0.78    Ca    8.0<L>      09 Nov 2022 12:45  Phos  3.1     11-09  Mg     1.70     11-09    TPro  6.8  /  Alb  2.7<L>  /  TBili  0.9  /  DBili  x   /  AST  18  /  ALT  10  /  AlkPhos  79  11-09    PT/INR - ( 09 Nov 2022 14:55 )   PT: 18.6 sec;   INR: 1.60 ratio         PTT - ( 09 Nov 2022 14:55 )  PTT:34.8 sec      RADIOLOGY & ADDITIONAL STUDIES:  < from: CT Angio Chest PE Protocol w/ IV Cont (09.30.22 @ 18:25) >  IMPRESSION:  No pulmonary embolism.    New patchy groundglass opacities since 8/6/2022, likely   infectious/inflammatory    Increased mediastinal lymphadenopathy.    Unchanged left upper lobe nodule since the prior study and decreased   since more remote studies.    < end of copied text >  < from: NM PET/CT Onc FDG Skull to Thigh, Subsq (10.21.22 @ 17:32) >  IMPRESSION: Compared to FDG-PET/CT scan dated 9/27/2021:    1. Diffuse patchy mildly FDG-avid bilateral groundglass pulmonary   opacities are new as compared to prior PET/CT, and appear increased since   9/30/2022, however, comparison is limited due to differences in CT   technique. An FDG-avid right upper lobe pulmonary nodule is new as   compared to prior PET/CT, and increased in size and density as compared   to CT dated 9/30/2022 (SUV 9.5). Findings are concerning for progression   of lymphoma. Correlate clinically to exclude infection. An irregular left   upper lobe pulmonary nodule containing small focus of increased FDG   activity is unchanged as compared to CT dated 9/30/2022, and is decreased   in size and metabolism as compared to prior PET/CT.    2. Multiple nonspecific FDG-avid mildly enlarged mediastinal and   bilateralhilar lymph nodes are increased in size, number, and   metabolism. Differential diagnosis includes recurrent lymphoma.    3. New nonspecific diffuse splenic and bone marrow hypermetabolism may be   secondary to lymphoma.    4. Resolution of FDG-avid left maxillary sinus mucosal thickening. New   non-FDG-avid minimal right maxillary sinus mucosal thickening.    Findings emailed to Dr. Zonia Madrigal.    < end of copied text >

## 2022-11-09 NOTE — PHYSICAL THERAPY INITIAL EVALUATION ADULT - GENERAL OBSERVATIONS, REHAB EVAL
Pt encountered in semisupine position, no distress, AxOx4, with +IV, and +cardiac monitor. Pt agreeable to participate in PT evaluation.

## 2022-11-09 NOTE — PHYSICAL THERAPY INITIAL EVALUATION ADULT - DIAGNOSIS, PT EVAL
Pt admitted for SOB and fatigue along with recent PET/CT concerning for lymphoma recurrence; Pt presents with decreased strength and decreased balance.

## 2022-11-09 NOTE — PHYSICAL EXAM
[Normal Appearance] : normal appearance [TextBox_2] : pale, dehydrated and hypotensive [TextBox_54] : sinus tachycardia [TextBox_68] : diffuse rhonchi and crackles [TextBox_99] : wheelchair

## 2022-11-09 NOTE — ASSESSMENT
[FreeTextEntry1] : 81 year old female with history of lymphoma referred for lung marking of right upper lobe nodule prior to VATS/resection. \par \par Right upper lobe lesion is FDG avid and new since prior CT chest 8/6/22. Patient is appropriate candidate for lung marking, scheduled for 11/10/2022. All questions answered and informed consent obtained. Left sided lesion have been stable across multiple PET/CTs. \par \par Sedation: IV sedation \par Position: Supine\par Imaging: Anterior right upper lobe nodule on PET/CT 10/21/2022 Image [id number]\par

## 2022-11-09 NOTE — REVIEW OF SYSTEMS
[Cough] : cough [Fever] : no fever [Chills] : no chills [Nosebleeds] : no nosebleeds [Sore Throat] : no sore throat [Chest Pain] : no chest pain [Palpitations] : no palpitations [Shortness Of Breath] : no shortness of breath [Wheezing] : no wheezing [Abdominal Pain] : no abdominal pain [Vomiting] : no vomiting [Constipation] : no constipation [Diarrhea] : no diarrhea [Easy Bleeding] : no tendency for easy bleeding [Easy Bruising] : no tendency for easy bruising

## 2022-11-10 NOTE — BRIEF OPERATIVE NOTE - NSICDXBRIEFPROCEDURE_GEN_ALL_CORE_FT
PROCEDURES:  Lung wedge resection 10-Nov-2022 15:45:22  Claire Toscano  Thoracoscopic biopsy of mediastinal lymph node 10-Nov-2022 15:46:04  Claire Toscano

## 2022-11-10 NOTE — PROGRESS NOTE ADULT - SUBJECTIVE AND OBJECTIVE BOX
SUBJECTIVE/ OVERNIGHT EVENTS:  Pt seen and examined at bedside.   No overnight event.  Feeling better.  no cp, no sob, no n/v/d.   awake alert  comfortable  hgb stable  no melena, no hematochezia. no BRBPR.   WBC better  OR today      --------------------------------------------------------------------------------------------  LABS:                        8.7    12.19 )-----------( 238      ( 10 Nov 2022 06:55 )             27.7     11-10    135  |  103  |  13  ----------------------------<  92  3.8   |  22  |  0.59    Ca    7.8<L>      10 Nov 2022 06:55  Phos  1.8     11-10  Mg     1.70     11-10    TPro  6.8  /  Alb  2.7<L>  /  TBili  0.9  /  DBili  x   /  AST  18  /  ALT  10  /  AlkPhos  79  11-09    PT/INR - ( 10 Nov 2022 07:44 )   PT: 17.1 sec;   INR: 1.47 ratio         PTT - ( 10 Nov 2022 07:44 )  PTT:36.9 sec  CAPILLARY BLOOD GLUCOSE                RADIOLOGY & ADDITIONAL TESTS:    Imaging Personally Reviewed:  [x] YES  [ ] NO    Consultant(s) Notes Reviewed:  [x] YES  [ ] NO    MEDICATIONS  (STANDING):  citalopram 10 milliGRAM(s) Oral daily  fluticasone propionate 50 MICROgram(s)/spray Nasal Spray 1 Spray(s) Both Nostrils two times a day  folic acid 1 milliGRAM(s) Oral daily  heparin   Injectable 5000 Unit(s) SubCutaneous every 12 hours  lactated ringers. 1000 milliLiter(s) (75 mL/Hr) IV Continuous <Continuous>  pantoprazole    Tablet 40 milliGRAM(s) Oral before breakfast  sodium chloride 1 Gram(s) Oral daily    MEDICATIONS  (PRN):      Care Discussed with Consultants/Other Providers [x] YES  [ ] NO    Vital Signs Last 24 Hrs  T(C): 36.6 (10 Nov 2022 10:05), Max: 37.1 (10 Nov 2022 01:00)  T(F): 97.8 (10 Nov 2022 10:05), Max: 98.8 (10 Nov 2022 01:00)  HR: 89 (10 Nov 2022 10:40) (59 - 94)  BP: 113/50 (10 Nov 2022 10:40) (111/59 - 146/58)  BP(mean): --  RR: 18 (10 Nov 2022 10:40) (14 - 18)  SpO2: 96% (10 Nov 2022 10:40) (95% - 100%)    Parameters below as of 10 Nov 2022 10:40  Patient On (Oxygen Delivery Method): room air      I&O's Summary    09 Nov 2022 07:01  -  10 Nov 2022 07:00  --------------------------------------------------------  IN: 975 mL / OUT: 100 mL / NET: 875 mL      PHYSICAL EXAM:  GENERAL: NAD, thin-elderly, comfortable  HEAD:  Atraumatic, Normocephalic  EYES: EOMI, PERRLA, conjunctiva and sclera clear  NECK: Supple, No JVD  CHEST/LUNG: mild decrease breath sounds bilaterally; No wheeze   HEART: Regular rate and rhythm; No murmurs, rubs, or gallops  ABDOMEN: Soft, Nontender, Nondistended; Bowel sounds present  Neuro: AAOx3, no focal weakness   EXTREMITIES:  2+ Peripheral Pulses, No clubbing, cyanosis, or edema  SKIN: No rashes or lesions

## 2022-11-10 NOTE — PRE-OP CHECKLIST - ALLERGY BAND ON
Discharge Instructions for Heart Attack  You have had a heart attack (acute myocardial infarction). A heart attack occurs when a vessel that sends blood to your heart suddenly becomes blocked. Follow these guidelines for home care and lifestyle changes.  Home care  · Take your medicines exactly as directed. Don’t skip doses.  · Remember that recovery after a heart attack takes time. Plan to rest for at least 4 to 8 weeks while you recover. Then return to normal activity when your doctor says it’s OK.  · Ask your doctor about joining a heart rehabilitation program.  · Tell your doctor if you are feeling depressed. Feelings of sadness are common after a heart attack. But it is important to speak to someone if you are feeling overwhelmed by these feelings.  · If you are having chest pain, call 911 for an ambulance. Do not drive yourself to the hospital.  · Ask your family members to learn CPR.  · Learn to take your own blood pressure and pulse. Keep a record of your results. Ask your doctor when you should seek emergency medical attention. He or she will tell you which blood pressure reading is dangerous.  Lifestyle changes  Your heart attack might have been caused by cardiovascular disease. Your healthcare provider will work with you to make changes to your lifestyle. This will help the heart disease from getting worse.  Diet  Your healthcare provider will tell you what changes you need to make to your diet. You may need to see a registered dietitian for help with these diet changes. These changes may include:  · Cutting back on the amount of fat and cholesterol in your diet  · Cutting back on the amount of sodium (salt) in your food, especially if you have high blood pressure  · Eating more fresh vegetables and fruits  · Eating lean proteins such as fish, poultry, and legumes (beans and peas), and eating less red meat and processed meats  · Using low-fat dairy products  · Using vegetable and nut oils in limited  amounts  · Limiting how many sweets and processed foods such as chips, cookies, and baked goods that you eat  Exercise  Your healthcare provider may tell you to get more exercise if you haven't been physically active. Depending on your case, your provider may recommend that you get moderate to vigorous physical activity for at least 40 minutes each day, and for at least 3 to 4 days each week. A few examples of moderate to vigorous activity include:  · Walking at a brisk pace, about 3 to 4 miles per hour  · Jogging or running  · Swimming or water aerobics  · Hiking  · Dancing  · Martial arts  · Tennis  · Riding a bicycle or stationary bike  · Dancing  Other changes  Your healthcare provider may also recommend that you:  · Lose weight. If you are overweight or obese, your provider will work with you to lose extra pounds. Making diet changes and getting more exercise can help.  · Stop smoking. Sign up for a stop-smoking program to make it more likely for you to quit for good.  · Learn to manage stress. Stress management techniques to help you deal with stress in your home and work life.  Follow-up  Make a follow-up appointment as directed by our staff.     When to seek medical advice  Call 911 right away if you have:  · Chest pain that is not relieved by medicine.  · Shortness of breath.  Otherwise, call your doctor immediately if you have:  · Lightheadedness, dizziness, or fainting.  · Feeling of irregular heartbeat or fast pulse.   © 9108-5126 [employer]. [address] [place] [address], [place] [address]. All rights reserved. This information is not intended as a substitute for professional medical care. Always follow your healthcare professional's instructions.      [facility] will follow you at home with Palliative care.  They will call you to set up an appointment.  If you need to reach them, they can be reached at:  [phone].   done

## 2022-11-10 NOTE — PROGRESS NOTE ADULT - SUBJECTIVE AND OBJECTIVE BOX
CHIEF COMPLAINT: FOLLOW UP IN ICU FOR POSTOPERATIVE CARE OF PATIENT WHO IS S/P LUNG RESECTION      PROCEDURES: R VATS RUL wedge, MLND  10-Nov-2022      ISSUES:   Lung nodule  Post op pain  Chest tube in place  Non-Hodgkin's Lymphoma  Hx of ANCA Vasculitis   Hx of L Lung nodule s/p LUIS wedge resection (2021)  Hx of diffuse large B cell lymphoma in remission  Hx of DVT and PE (s/p completed treatment with Apixaban in 4/2021)  GERD  HLD      INTERVAL EVENTS:   OR today. Extubated in OR. Transferred to CTICU.      HISTORY:   Patient reports moderate pain at chest wall incision sites which is worse with coughing and deep breathing without associated fever or dyspnea. Pain is improved with use of pain meds.     PHYSICAL EXAM:   Gen: Comfortable, No acute distress  Eyes: Sclera white, Conjunctiva normal, Eyelids normal, Pupils symmetrical   ENT: Mucous membranes moist,  ,  ,    Neck: Trachea midline,  ,  ,  ,  ,  ,    CV: Rate regular, Rhythm regular,  ,  ,    Resp: Breath sounds clear, No accessory muscles use, R chest tube in place,  ,    Abd: Soft, Non-distended, Non-tender, Bowel sounds normal,  ,  ,    Skin: Warm, No peripheral edema of lower extremities,  ,    : No lopez  Neuro: Moving all 4 extremities,    Psych: A&Ox3      ASSESSMENT AND PLAN:     NEURO:  Post-operative Pain - Stable. Pain control with PCA and Tylenol IV PRN.     Depression - stable. continue psych meds.         RESPIRATORY:  Hypoxia - Wean nasal cannula for goal O2sat above 92. Obtain CXR. Incentive spirometry. Chest PT and frequent suctioning. Continue bronchodilators. OOB to chair & ambulate w/ assistance. Continuous pulse oximetry for support & to prevent decompensation.       Chest tube – Pleurevac water seal. Monitor chest tube output.        CARDIOVASCULAR:  Hemodynamically stable - Not on pressors. Continue hemodynamic monitoring.  Telemetry (medical test) - Reviewed by me today independently. Normal sinus rhythm.            RENAL:  Stable - Monitor IOs and electrolytes. Keep K above 4.0 and Mg above 2.0.              GASTROINTESTINAL:  GI prophylaxis not indicated  Zofran and Reglan IV PRN for nausea  Regular consistency diet        GERD - stable. Continue pantoprazole               HEMATOLOGIC:  No signs of active bleeding. Monitor Hgb in CBC in AM  DVT prophylaxis with heparin subQ and SCDs.               INFECTIOUS DISEASE:  All surgical sites appear clean. No signs of active infection. Will monitor for fever and leukocytosis.              ENDOCRINE:  Stable – Monitor glucose fingersticks for goal 120-180.               ONCOLOGY:  Lung nodule - Improved. S/P resection. Follow up final pathology.           Pertinent clinical, laboratory, radiographic, hemodynamic, echocardiographic, respiratory data, microbiologic data and chart were reviewed by myself and analyzed frequently throughout the course of the day and night by myself.    Plan discussed at length with the CTICU staff and Attending CT Surgeon -   Dr Triston Pettit.      Patient's status was discussed with patient at bedside.     	      ________________________________________________      _________________________  VITAL SIGNS:  Vital Signs Last 24 Hrs  T(C): 36.6 (10 Nov 2022 10:05), Max: 37.1 (10 Nov 2022 01:00)  T(F): 97.8 (10 Nov 2022 10:05), Max: 98.8 (10 Nov 2022 01:00)  HR: 89 (10 Nov 2022 10:40) (59 - 94)  BP: 113/50 (10 Nov 2022 10:40) (111/59 - 146/58)  BP(mean): --  RR: 18 (10 Nov 2022 10:40) (14 - 18)  SpO2: 96% (10 Nov 2022 10:40) (95% - 100%)    Parameters below as of 10 Nov 2022 10:40  Patient On (Oxygen Delivery Method): room air      I/Os:   I&O's Detail    09 Nov 2022 07:01  -  10 Nov 2022 07:00  --------------------------------------------------------  IN:    Lactated Ringers: 825 mL    Oral Fluid: 150 mL  Total IN: 975 mL    OUT:    Voided (mL): 100 mL  Total OUT: 100 mL    Total NET: 875 mL              MEDICATIONS:  MEDICATIONS  (STANDING):  acetaminophen   IVPB .. 750 milliGRAM(s) IV Intermittent once  citalopram 10 milliGRAM(s) Oral daily  fluticasone propionate 50 MICROgram(s)/spray Nasal Spray 1 Spray(s) Both Nostrils two times a day  folic acid 1 milliGRAM(s) Oral daily  heparin   Injectable 5000 Unit(s) SubCutaneous every 12 hours  HYDROmorphone PCA (1 mG/mL) 30 milliLiter(s) PCA Continuous PCA Continuous  lidocaine   4% Patch 1 Patch Transdermal daily  pantoprazole    Tablet 40 milliGRAM(s) Oral before breakfast  polyethylene glycol 3350 17 Gram(s) Oral daily  senna 1 Tablet(s) Oral daily  sodium chloride 1 Gram(s) Oral daily    MEDICATIONS  (PRN):  HYDROmorphone PCA (1 mG/mL) Rescue Clinician Bolus 0.5 milliGRAM(s) IV Push every 15 minutes PRN for Pain Scale GREATER THAN 6  naloxone Injectable 0.1 milliGRAM(s) IV Push every 3 minutes PRN For ANY of the following changes in patient status:  A. RR LESS THAN 10 breaths per minute, B. Oxygen saturation LESS THAN 90%, C. Sedation score of 6  ondansetron Injectable 4 milliGRAM(s) IV Push every 6 hours PRN Nausea      LABS:  Laboratory data was independently reviewed by me today.                           8.7    12.19 )-----------( 238      ( 10 Nov 2022 06:55 )             27.7     11-10    135  |  103  |  13  ----------------------------<  92  3.8   |  22  |  0.59    Ca    7.8<L>      10 Nov 2022 06:55  Phos  1.8     11-10  Mg     1.70     11-10    TPro  6.8  /  Alb  2.7<L>  /  TBili  0.9  /  DBili  x   /  AST  18  /  ALT  10  /  AlkPhos  79  11-09    LIVER FUNCTIONS - ( 09 Nov 2022 12:45 )  Alb: 2.7 g/dL / Pro: 6.8 g/dL / ALK PHOS: 79 U/L / ALT: 10 U/L / AST: 18 U/L / GGT: x           PT/INR - ( 10 Nov 2022 07:44 )   PT: 17.1 sec;   INR: 1.47 ratio         PTT - ( 10 Nov 2022 07:44 )  PTT:36.9 sec        RADIOLOGY:   Radiology images were independently reviewed by me today. Reports were reviewed by me today.    Xray Chest 1 View- PORTABLE-Urgent:   ACC: [id number] EXAM:  XR CHEST PORTABLE URGENT 1V                          PROCEDURE DATE:  11/09/2022          INTERPRETATION:  CLINICAL INFORMATION: Lymphoma with right upper lung   nodule, plan for right VATS    EXAM: Frontal view of the chest.    COMPARISON: PET scan 10/21/2022, CT images from IR procedure 11/10/2022.    FINDINGS:    Left chest wall MediPort with tip in the SVC.  The heart is normal in size.  The lungs are clear. Known right upper lung nodule is better visualized   on prior CT chest.  Small right pneumothorax which has resorbed on CT images from IR   procedure taken the following day. No pleural effusions.    IMPRESSION:  Small right pneumothorax which has resorbed on CT images from IR   procedure taken the following day.    --- End of Report ---          GAMAL BOOKER MD; Resident Radiology  This document has been electronically signed.  DANIEL NASSAR MD; Attending Radiologist  This document has been electronically signed. Nov 10 2022 10:55AM (11-09-22 @ 17:51)       CHIEF COMPLAINT: FOLLOW UP IN ICU FOR POSTOPERATIVE CARE OF PATIENT WHO IS S/P LUNG RESECTION      PROCEDURES: R VATS RUL wedge, MLND  10-Nov-2022      ISSUES:   Lung nodule  Post op pain  Chest tube in place  Non-Hodgkin's Lymphoma  ANCA Vasculitis   Hx of L Lung nodule s/p LUIS wedge resection (2021)  Hx of diffuse large B cell lymphoma in remission  Hx of DVT and PE (s/p completed treatment with Apixaban in 4/2021)  GERD  HLD      INTERVAL EVENTS:   OR today. Extubated in OR. Transferred to CTICU.      HISTORY:   Patient reports moderate pain at chest wall incision sites which is worse with coughing and deep breathing without associated fever or dyspnea. Pain is improved with use of pain meds.     PHYSICAL EXAM:   Gen: Comfortable, No acute distress  Eyes: Sclera white, Conjunctiva normal, Eyelids normal, Pupils symmetrical   ENT: Mucous membranes moist,  ,  ,    Neck: Trachea midline,  ,  ,  ,  ,  ,    CV: Rate regular, Rhythm regular,  ,  ,    Resp: Breath sounds clear, No accessory muscles use, R chest tube in place,  ,    Abd: Soft, Non-distended, Non-tender, Bowel sounds normal,  ,  ,    Skin: Warm, No peripheral edema of lower extremities,  ,    : No lopez  Neuro: Moving all 4 extremities,    Psych: A&Ox3      ASSESSMENT AND PLAN:     NEURO:  Post-operative Pain - Stable. Pain control with PCA and Tylenol IV PRN.     Depression - stable. continue psych meds.         RESPIRATORY:  Hypoxia - Wean nasal cannula for goal O2sat above 92. Obtain CXR. Incentive spirometry. Chest PT and frequent suctioning. Continue bronchodilators. OOB to chair & ambulate w/ assistance. Continuous pulse oximetry for support & to prevent decompensation.       Chest tube – Pleurevac water seal. Monitor chest tube output.        CARDIOVASCULAR:  Hemodynamically stable - Not on pressors. Continue hemodynamic monitoring.  Telemetry (medical test) - Reviewed by me today independently. Normal sinus rhythm.     ANCA Vasculitis - Rheumatology consult. Workup pending prior to surgery. Follow up labs results.          RENAL:  Stable - Monitor IOs and electrolytes. Keep K above 4.0 and Mg above 2.0.              GASTROINTESTINAL:  GI prophylaxis not indicated  Zofran and Reglan IV PRN for nausea  Regular consistency diet        GERD - stable. Continue pantoprazole               HEMATOLOGIC:  No signs of active bleeding. Monitor Hgb in CBC in AM  DVT prophylaxis with heparin subQ and SCDs.               INFECTIOUS DISEASE:  All surgical sites appear clean. No signs of active infection. Will monitor for fever and leukocytosis.              ENDOCRINE:  Stable – Monitor glucose fingersticks for goal 120-180.               ONCOLOGY:  Lung nodule - Improved. S/P resection. Follow up final pathology.           Pertinent clinical, laboratory, radiographic, hemodynamic, echocardiographic, respiratory data, microbiologic data and chart were reviewed by myself and analyzed frequently throughout the course of the day and night by myself.    Plan discussed at length with the CTICU staff and Attending CT Surgeon -   Dr Triston Pettit.      Patient's status was discussed with patient at bedside.     	      ________________________________________________      _________________________  VITAL SIGNS:  Vital Signs Last 24 Hrs  T(C): 36.6 (10 Nov 2022 10:05), Max: 37.1 (10 Nov 2022 01:00)  T(F): 97.8 (10 Nov 2022 10:05), Max: 98.8 (10 Nov 2022 01:00)  HR: 89 (10 Nov 2022 10:40) (59 - 94)  BP: 113/50 (10 Nov 2022 10:40) (111/59 - 146/58)  BP(mean): --  RR: 18 (10 Nov 2022 10:40) (14 - 18)  SpO2: 96% (10 Nov 2022 10:40) (95% - 100%)    Parameters below as of 10 Nov 2022 10:40  Patient On (Oxygen Delivery Method): room air      I/Os:   I&O's Detail    09 Nov 2022 07:01  -  10 Nov 2022 07:00  --------------------------------------------------------  IN:    Lactated Ringers: 825 mL    Oral Fluid: 150 mL  Total IN: 975 mL    OUT:    Voided (mL): 100 mL  Total OUT: 100 mL    Total NET: 875 mL              MEDICATIONS:  MEDICATIONS  (STANDING):  acetaminophen   IVPB .. 750 milliGRAM(s) IV Intermittent once  citalopram 10 milliGRAM(s) Oral daily  fluticasone propionate 50 MICROgram(s)/spray Nasal Spray 1 Spray(s) Both Nostrils two times a day  folic acid 1 milliGRAM(s) Oral daily  heparin   Injectable 5000 Unit(s) SubCutaneous every 12 hours  HYDROmorphone PCA (1 mG/mL) 30 milliLiter(s) PCA Continuous PCA Continuous  lidocaine   4% Patch 1 Patch Transdermal daily  pantoprazole    Tablet 40 milliGRAM(s) Oral before breakfast  polyethylene glycol 3350 17 Gram(s) Oral daily  senna 1 Tablet(s) Oral daily  sodium chloride 1 Gram(s) Oral daily    MEDICATIONS  (PRN):  HYDROmorphone PCA (1 mG/mL) Rescue Clinician Bolus 0.5 milliGRAM(s) IV Push every 15 minutes PRN for Pain Scale GREATER THAN 6  naloxone Injectable 0.1 milliGRAM(s) IV Push every 3 minutes PRN For ANY of the following changes in patient status:  A. RR LESS THAN 10 breaths per minute, B. Oxygen saturation LESS THAN 90%, C. Sedation score of 6  ondansetron Injectable 4 milliGRAM(s) IV Push every 6 hours PRN Nausea      LABS:  Laboratory data was independently reviewed by me today.                           8.7    12.19 )-----------( 238      ( 10 Nov 2022 06:55 )             27.7     11-10    135  |  103  |  13  ----------------------------<  92  3.8   |  22  |  0.59    Ca    7.8<L>      10 Nov 2022 06:55  Phos  1.8     11-10  Mg     1.70     11-10    TPro  6.8  /  Alb  2.7<L>  /  TBili  0.9  /  DBili  x   /  AST  18  /  ALT  10  /  AlkPhos  79  11-09    LIVER FUNCTIONS - ( 09 Nov 2022 12:45 )  Alb: 2.7 g/dL / Pro: 6.8 g/dL / ALK PHOS: 79 U/L / ALT: 10 U/L / AST: 18 U/L / GGT: x           PT/INR - ( 10 Nov 2022 07:44 )   PT: 17.1 sec;   INR: 1.47 ratio         PTT - ( 10 Nov 2022 07:44 )  PTT:36.9 sec        RADIOLOGY:   Radiology images were independently reviewed by me today. Reports were reviewed by me today.    Xray Chest 1 View- PORTABLE-Urgent:   ACC: [id number] EXAM:  XR CHEST PORTABLE URGENT 1V                          PROCEDURE DATE:  11/09/2022          INTERPRETATION:  CLINICAL INFORMATION: Lymphoma with right upper lung   nodule, plan for right VATS    EXAM: Frontal view of the chest.    COMPARISON: PET scan 10/21/2022, CT images from IR procedure 11/10/2022.    FINDINGS:    Left chest wall MediPort with tip in the SVC.  The heart is normal in size.  The lungs are clear. Known right upper lung nodule is better visualized   on prior CT chest.  Small right pneumothorax which has resorbed on CT images from IR   procedure taken the following day. No pleural effusions.    IMPRESSION:  Small right pneumothorax which has resorbed on CT images from IR   procedure taken the following day.    --- End of Report ---          GAMAL BOOKER MD; Resident Radiology  This document has been electronically signed.  DANIEL NASSAR MD; Attending Radiologist  This document has been electronically signed. Nov 10 2022 10:55AM (11-09-22 @ 17:51)

## 2022-11-10 NOTE — CHART NOTE - NSCHARTNOTEFT_GEN_A_CORE
PRE-INTERVENTIONAL RADIOLOGY PROCEDURE NOTE      Patient Age: 81    Patient Gender: Female    Procedure: IR Marking of RUL Lung Nodule    Diagnosis/Indication: RUL Nodule    Interventional Radiology Attending Physician: Dr. Fang    Ordering Attending Physician: Dr. Pettit    Pertinent Medical History: HTN, Non-Hodgkins Lymphoma, PE/DVT (no longer on AC), ANCA vasculitis, b/l GGO    Pertinent labs:                      8.7    12.19 )-----------( 238      ( 10 Nov 2022 06:55 )             27.7       11-09    134<L>  |  97<L>  |  15  ----------------------------<  119<H>  3.4<L>   |  23  |  0.78    Ca    8.0<L>      09 Nov 2022 12:45  Phos  3.1     11-09  Mg     1.70     11-09    TPro  6.8  /  Alb  2.7<L>  /  TBili  0.9  /  DBili  x   /  AST  18  /  ALT  10  /  AlkPhos  79  11-09      PT/INR - ( 09 Nov 2022 14:55 )   PT: 18.6 sec;   INR: 1.60 ratio         PTT - ( 09 Nov 2022 14:55 )  PTT:34.8 sec        Patient and Family Aware ? Yes

## 2022-11-10 NOTE — PROGRESS NOTE ADULT - ASSESSMENT
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 Patient states that recently she has been feeling very fatigued.    Fatigue/dyspnea, with hx of Lymphoma: will need to r/o recurrent  - plan for R VATS with RUL lung resection and mediastinal LN dissection  - heme/onc following   - per pt, recently seen by Cardiology Dr. Ady Cooper and was cleared for OR. Recent TTE on 11/3/22 reviewed: normal LV  - PT also saw pulm Mack Tucker in the office, with some concern for her overall status. She was hypotensive to systolic 90s in the office.  (now BP improves s/p IVF here).   - d/w Dr. Pettit, will transfer case under medicine.   - Rheum has also been consulted to r/o vasculitis per heme/onc recommendation. labs sent.   - clinically she feels okay without chest pain.   - medically optimized.     Anemia  - hgb 8.1 lower than her baseline.  - no melena, no hematochezia. no BRBPR.   - recent Anemia work-up reviewed: normal vit b12, folate.  - repeat iron studies ordered    - s/p 2 units PRBC 11/1/22 per heme's note.   - given 1 unit prbc 11/9/22 with appropriate post transfusion hgb.      Leukocytosis  - stable CXR, UA. (no urinary symptoms)  - stable post transfusion hgb.     hx on ANCA+ vasculitis  - no current flair   - rheum eval per heme/onc request given pulm findings on CT chest.     Anxiety and depression  - stable, continue citalopram.  - Pt denied SI/HI ideations, denied visual and auditory hallucinations.     GERD (gastroesophageal reflux disease)  - continue PPI    Hyperlipidemia.   - continue home ezetimibe. okay to hold if nonformulary   - Lipid panel    DVT ppx

## 2022-11-10 NOTE — PROCEDURE NOTE - PROCEDURE FINDINGS AND DETAILS
Limited CT chest revealed RUL nodule seen on recent PET CT. Nodule was marked with 1cc methylene blue and a 3mm x 14cm coil was deployed. Post procedure limited CT revealed coil in good position without complications. Full report to follow.

## 2022-11-10 NOTE — CHART NOTE - NSCHARTNOTEFT_GEN_A_CORE
[facility] [facility]    Attempted to see Pt today. Pt was in PACU at time of visit.    Will attempt at another time.

## 2022-11-10 NOTE — PRE-OP CHECKLIST - SIDE RAILS UP
Patient Education       Earwax, Home Treatment    Everyone produces earwax from the lining of the ear canal. It serves to lubricate and protect the ear. The wax that forms in the canal naturally moves toward the outside of the ear and falls out. Sometimes the ear canal may contain too much wax. This can cause a blockage and loss of hearing. Directions are given below for home treatment.  Home care  If your doctor has advised you to remove a wax blockage yourself, follow these directions:  · Unless a medicine was prescribed, you may use an over-the-counter product made for clearing earwax. These contain carbamide peroxide. Lie down with the blocked ear facing upward. Apply one dropper full of medicine and wait a few minutes. Grasp the outer ear and wiggle it to help the solution enter the canal.  · Lean over a sink or basin with the blocked ear facing downward. Use a bulb syringe filled with warm (not hot or cold) water to rinse the ear several times. Use gentle pressure only.  · If you are having trouble draining the water out of your ear canal, put a few drops of rubbing alcohol (isopropyl alcohol) into the ear canal. This will help remove the remaining water.  · Repeat this procedure once a day for up to three days, or until your hearing is back to normal. Do not use this treatment for more than three days in a row.  Don’ts  · Don’t use cold water to rinse the ear. This will make you dizzy.  · Don’t perform this procedure if you have an ear infection.  · Don’t perform this procedure if you have a ruptured eardrum.  · Don’t use cotton swabs, matches, hairpins, keys, or other objects to “clean” the ear canal. This can cause infection of the ear canal or rupture the eardrum. Because of their size and shape, cotton swabs can push earwax deeper into the ear canal instead of removing it.  Follow-up care  Follow up with your health care provider if you are not improving after three cleaning attempts, or as advised.  When  to seek medical advice  Call your health care provider right away if any of these occur:  · Worsening ear pain  · Fever of 101°F (38.3°C) or higher, or as directed by your health care provider  · Hearing does not return to normal after three days of treatment  · Fluid drainage or bleeding from the ear canal  · Swelling, redness, or tenderness of the outer ear  · Headache, neck pain, or stiff neck  © 0892-2019 [employer]. [address] [place] [address], [place] [address]. All rights reserved. This information is not intended as a substitute for professional medical care. Always follow your healthcare professional's instructions.              done

## 2022-11-10 NOTE — PROGRESS NOTE ADULT - NSPROGADDITIONALINFOA_GEN_ALL_CORE
d/w pt and Dr. Pettit.    - Dr. MARY Tanget ([facility])  - [phone] d/w pt and Dr. Pettit.    Pt of PCP Dr. Crystal Santiago at [facility].     - Dr. MARY Arias ([facility])  - [phone]

## 2022-11-10 NOTE — PRE PROCEDURE NOTE - PRE PROCEDURE EVALUATION
Interventional Radiology Pre-Procedure Note    Procedure: RUL nodule lung marking    Diagnosis/Indication: Patient is a 81y old  Female with PMH DLBCL, s/p LVATS and LUIS wedge resection 2021 who presents for planned RVATS, RUL Nodule Biopsy w/ IR marking (09 Nov 2022 18:07).       PAST MEDICAL & SURGICAL HISTORY:  ANCA-associated vasculitis      Anxiety and depression      Pulmonary embolism  2021      DVT, lower extremity  2021      GERD (gastroesophageal reflux disease)      Hyperlipidemia      Lung mass  s/p left wedge resection      DLBCL (diffuse large B cell lymphoma)      History of appendectomy      Lung nodule  pt had wedge resection in 2021      Non-Hodgkins lymphoma  chemoport inserted in 2021           Allergies: codeine (Nausea)  doxycycline (Nausea)  penicillin (Hives)      LABS:                        8.7    12.19 )-----------( 238      ( 10 Nov 2022 06:55 )             27.7     11-10    135  |  103  |  13  ----------------------------<  92  3.8   |  22  |  0.59    Ca    7.8<L>      10 Nov 2022 06:55  Phos  1.8     11-10  Mg     1.70     11-10    TPro  6.8  /  Alb  2.7<L>  /  TBili  0.9  /  DBili  x   /  AST  18  /  ALT  10  /  AlkPhos  79  11-09    PT/INR - ( 10 Nov 2022 07:44 )   PT: 17.1 sec;   INR: 1.47 ratio         PTT - ( 10 Nov 2022 07:44 )  PTT:36.9 sec    Procedure/ risks/ benefits were explained, informed consent obtained from patient, verbalizes understanding.

## 2022-11-11 NOTE — PROGRESS NOTE ADULT - SUBJECTIVE AND OBJECTIVE BOX
Anesthesia Pain Management Service- Attending Addendum    SUBJECTIVE: Patient's pain control adequate    Therapy:	  [ X] IV PCA	   [ ] Epidural           [ ] s/p Spinal Opoid              [ ] Postpartum infusion	  [ ] Patient controlled regional anesthesia (PCRA)    [ ] prn Analgesics    Allergies    codeine (Nausea)  doxycycline (Nausea)  penicillin (Hives)    Intolerances      MEDICATIONS  (STANDING):  acetaminophen   IVPB .. 750 milliGRAM(s) IV Intermittent once  citalopram 10 milliGRAM(s) Oral daily  fluticasone propionate 50 MICROgram(s)/spray Nasal Spray 1 Spray(s) Both Nostrils two times a day  folic acid 1 milliGRAM(s) Oral daily  heparin   Injectable 5000 Unit(s) SubCutaneous every 12 hours  lidocaine   4% Patch 1 Patch Transdermal daily  pantoprazole    Tablet 40 milliGRAM(s) Oral before breakfast  polyethylene glycol 3350 17 Gram(s) Oral daily  senna 1 Tablet(s) Oral daily  sodium chloride 1 Gram(s) Oral daily    MEDICATIONS  (PRN):  HYDROmorphone   Tablet 1 milliGRAM(s) Oral every 3 hours PRN Moderate-Severe Pain (4 - 10)  HYDROmorphone  Injectable 0.2 milliGRAM(s) IV Push every 3 hours PRN Severe Breakthrough Pain (7 - 10)  naloxone Injectable 0.1 milliGRAM(s) IV Push every 3 minutes PRN For ANY of the following changes in patient status:  A. RR LESS THAN 10 breaths per minute, B. Oxygen saturation LESS THAN 90%, C. Sedation score of 6  ondansetron Injectable 4 milliGRAM(s) IV Push every 6 hours PRN Nausea      OBJECTIVE:   [X] No new signs     [ ] Other:    Side Effects:  [X ] None			[ ] Other:      ASSESSMENT/PLAN  -Discontinue current therapy    [ ] Therapy changed to:    [ ] IV PCA       [ ] Epidural     [ X] prn Analgesics     Comments: Pain management per primary team, APS to sign off    Note entered after patient seen

## 2022-11-11 NOTE — PROGRESS NOTE ADULT - ASSESSMENT
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 Patient states that recently she has been feeling very fatigued.    Fatigue/dyspnea, with hx of Lymphoma: will need to r/o recurrent  - heme/onc following   - Recent TTE on 11/3/22 reviewed: normal LV. outpt card Dr. Ady Cooper  - PT also saw pulm Mack Tucker in the office, with some concern for her overall status. She was hypotensive to systolic 90s in the office.  (now BP improves s/p IVF here).   - Rheum has also been consulted to r/o vasculitis per heme/onc recommendation. labs sent.   - clinically she feels okay without chest pain.   - medically optimized.   - s/p thoracoscopic biopsy of mediastinal lymph node and Lung wedge resection 11/10/22  - f/u path results (pending)   - monitored in CT-ICU post op, remain stable. plan for floor transfer.   - PT: rehab. Pt prefers home PT. fall precautions discussed.     Anemia  - hgb 8.1 lower than her baseline.  - no melena, no hematochezia. no BRBPR.   - recent Anemia work-up reviewed: normal vit b12, folate.  - repeat iron studies ordered    - s/p 2 units PRBC 11/1/22 per heme's note.   - given 1 unit prbc 11/9/22 with appropriate post transfusion hgb.    - no melena, no hematochezia. no BRBPR.   - will continue to monitor    Leukocytosis  - stable CXR, UA. (no urinary symptoms)  - stable post transfusion hgb.     hx on ANCA+ vasculitis  - no current flair   - rheum eval per heme/onc request given pulm findings on CT chest.     Anxiety and depression  - stable, continue citalopram.  - Pt denied SI/HI ideations, denied visual and auditory hallucinations.     GERD (gastroesophageal reflux disease)  - continue PPI    Hyperlipidemia.   - continue home ezetimibe. okay to hold if nonformulary   - Lipid panel    DVT ppx

## 2022-11-11 NOTE — PROGRESS NOTE ADULT - ASSESSMENT
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 Patient has been having worsening sob and fatigue along with recent PET/CT concerning for lymphoma recurrence.     #EBV+ DLBCL  - dx 2021 s/p 4 cycles R-CHOP c/b fatigue, neutropenia and thrombocytopenia, last few cycles R-miniCHOP (completed 6 cycles total on 9/9/21 with EOT PET/CT LUIS nodule decrease in size and resolution of RUL nodule: Deauville 3. On surveillance imaging and found to have worsening symptoms of fatigue and sob with hospitalization 9/2022 CTA chest showed worsening GGOs and mediastinal LAD (i.e. 2 x 1.2 --> 2.4 x 1.8 cm), concerning for recurrence of her lymphoma. Stable residual LUIS mass (1.4 x 1 cm) compared to last CT. s/p EBUS guided FNA which revealed lymphoplasmacytic cells present but not diagnostic of recurrent DLBCL   -PET/CT for restaging on 10/21/22: 1. Diffuse patchy mildly FDG-avid bilateral groundglass pulmonary opacities are new as compared to prior PET/CT, and appear increased since 9/30/2022, however, comparison is limited due to differences in CT technique. An FDG-avid right upper lobe pulmonary nodule is new as compared to prior PET/CT, and increased in size and density as compared to CT dated 9/30/2022 (SUV 9.5). Findings are concerning for progression of lymphoma. Correlate clinically to exclude infection. An irregular left upper lobe pulmonary nodule containing small focus of increased FDG activity is unchanged as compared to CT dated 9/30/2022, and is decreased in size and metabolism as compared to prior PET/CT. 2. Multiple nonspecific FDG-avid mildly enlarged mediastinal and bilateral hilar lymph nodes are increased in size, number, and metabolism. Differential diagnosis includes recurrent lymphoma.3. New nonspecific difuse splenic and bone marrow hypermetabolism may be secondary to lymphoma.4. Resolution of FDG-avid left maxillary sinus mucosal thickening. New non-FDG-avid minimal right maxillary sinus mucosal thickening.  - noted to moose anemic to 7.2 Hgb 10/29/22 s/p 2 units PRBC 11/1/22   - Dr. Pettit - planning for surgery on 11/9/22  - Bone marrow bx 11/1/22 negative   - plan for flex bronch, right VATS RUL lung resection and mediastinal LN dissection on 11/10 with Dr. Pettit   - EBV PCR <35  - monitor CBC with diff and TLS labs daily (LDH, uric acid, K, Phos, Ca)   - If no objection from Rheumatology would start prednisone 40mg daily    #ANCA Vasculitis  - azathioprine has been on hold  - given GGOs and PET/CT findings, off azathioprine  - appreciate rheum eval- pending biopsy results       Vincent Novak MD, PGY6  Hematology/Oncology Fellow   pager [phone]  After 5pm and on weekends page on call fellow

## 2022-11-11 NOTE — PROGRESS NOTE ADULT - SUBJECTIVE AND OBJECTIVE BOX
INTERVAL HPI/OVERNIGHT EVENTS:  Patient S&E at bedside. S/P VATS RUL wedge resection and LN dissection on 11/10. Chest tube removed, doing well overall pending path.     VITAL SIGNS:  T(F): 97.6 (11-11-22 @ 12:00)  HR: 68 (11-11-22 @ 12:00)  BP: 105/44 (11-11-22 @ 12:00)  RR: 18 (11-11-22 @ 12:00)  SpO2: 100% (11-11-22 @ 12:00)  Wt(kg): --    PHYSICAL EXAM:    Constitutional: NAD  Eyes: EOMI, sclera non-icteric  Neck: supple  Respiratory: CTAB, no wheezes or crackles   Cardiovascular: RRR  Gastrointestinal: soft, NTND, + BS  Extremities: no cyanosis, clubbing or edema   Neurological: awake and alert      MEDICATIONS  (STANDING):  acetaminophen   IVPB .. 750 milliGRAM(s) IV Intermittent once  citalopram 10 milliGRAM(s) Oral daily  fluticasone propionate 50 MICROgram(s)/spray Nasal Spray 1 Spray(s) Both Nostrils two times a day  folic acid 1 milliGRAM(s) Oral daily  heparin   Injectable 5000 Unit(s) SubCutaneous every 12 hours  lidocaine   4% Patch 1 Patch Transdermal daily  pantoprazole    Tablet 40 milliGRAM(s) Oral before breakfast  polyethylene glycol 3350 17 Gram(s) Oral daily  senna 1 Tablet(s) Oral daily  sodium chloride 1 Gram(s) Oral daily    MEDICATIONS  (PRN):  HYDROmorphone   Tablet 1 milliGRAM(s) Oral every 3 hours PRN Moderate-Severe Pain (4 - 10)  HYDROmorphone  Injectable 0.2 milliGRAM(s) IV Push every 3 hours PRN Severe Breakthrough Pain (7 - 10)  naloxone Injectable 0.1 milliGRAM(s) IV Push every 3 minutes PRN For ANY of the following changes in patient status:  A. RR LESS THAN 10 breaths per minute, B. Oxygen saturation LESS THAN 90%, C. Sedation score of 6  ondansetron Injectable 4 milliGRAM(s) IV Push every 6 hours PRN Nausea      Allergies    codeine (Nausea)  doxycycline (Nausea)  penicillin (Hives)    Intolerances        LABS:                        9.4    8.36  )-----------( 240      ( 11 Nov 2022 04:15 )             30.7     11-11    132<L>  |  100  |  21  ----------------------------<  147<H>  4.8   |  21<L>  |  0.75    Ca    7.9<L>      11 Nov 2022 04:15  Phos  4.6     11-11  Mg     1.90     11-11    TPro  6.8  /  Alb  2.7<L>  /  TBili  0.9  /  DBili  x   /  AST  18  /  ALT  10  /  AlkPhos  79  11-09    PT/INR - ( 10 Nov 2022 07:44 )   PT: 17.1 sec;   INR: 1.47 ratio         PTT - ( 10 Nov 2022 07:44 )  PTT:36.9 sec      RADIOLOGY & ADDITIONAL TESTS:  Studies reviewed.

## 2022-11-11 NOTE — PROGRESS NOTE ADULT - ATTENDING COMMENTS
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 Patient has been having worsening sob and fatigue along with recent PET/CT concerning for lymphoma recurrence. PET/CT for restaging on 10/21/22 shows new diffuse patchy mildly FDG-avid bilateral groundglass pulmonary opacities, a new FDG-avid right upper lobe pulmonary nodule, and multiple nonspecific FDG-avid mildly enlarged mediastinal and bilateral hilar lymph nodes are increased in size, number, and metabolism. Bone marrow bx 11/1/22 negative. Flex bronch, right VATS RUL lung resection and mediastinal LN dissection done on 11/10 with Dr. Pettit. She also has a history of ANCA vasculitis with azathioprine on hold since receiving chemotherapy. Given GGOs and PET/CT findings, off azathioprine Rheumatology asked to evaluate patient. Will follow up on pathology when available and plan to start Prednisone 40 mg daily on 11/12/22.

## 2022-11-11 NOTE — PROGRESS NOTE ADULT - SUBJECTIVE AND OBJECTIVE BOX
BETTIE PRATER      81y   Female   MRN-[id number]         codeine (Nausea)  doxycycline (Nausea)  penicillin (Hives)             Daily     Daily Drug Dosing Weight  Height (cm): 147.3 (10 Nov 2022 02:06)  Weight (kg): 58.1 (10 Nov 2022 02:06)  BMI (kg/m2): 26.8 (10 Nov 2022 02:06)  BSA (m2): 1.51 (10 Nov 2022 02:06)    HPI:  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), s/p LVATS, LUIS wedge resection (2021) direct admit for RVATS, RUL Nodule Biopsy w/ IR marking. Patient states that recently she has been feeling very fatigued. She states that moving around her house, or even to the bathroom, has been causing her to get very tired and causes her some minor chest pain, which resolves quickly with rest. She states that her breathing has been non-labored, denies dyspnea, shortness of breath, inability to catch her breath. She states that she has also not had much of an appetite over the last month and has been eating mostly soft foods. She admits to about a 5lb weight loss over the last month. She admits to a minor cough that occasionally occurs in fits and makes her feel as if she may vomit. Patient admits to some nausea and aversion to food but denies mechanical issues, inability to eat or feeling of choking. Denies hematemesis, hemoptysis.   (09 Nov 2022 12:31)      CHIEF COMPLAINT: Follow up in ICU  for postoperative care of patient who is s/p  R VATS RUL wedge, MLND    Procedure: R VATS RUL wedge, MLND  10-Nov-2022                       Issues:              Lung nodule              Postop pain              Chest tube in place  Non-Hodgkin's Lymphoma  ANCA Vasculitis   Hx of L Lung nodule s/p LUIS wedge resection (2021)  Hx of diffuse large B cell lymphoma in remission  Hx of DVT and PE (s/p completed treatment with Apixaban in 4/2021)  GERD  HLD    Postop course:     Patient reports moderate pain at chest wall incision sites which is worse with coughing and deep breathing without associated fever or dyspnea. Pain is improved with use of PCA and  oral pain meds.         Home Medications:  citalopram 10 mg oral tablet: 1 tab(s) orally once a day (04 Nov 2022 15:16)  ezetimibe 10 mg oral tablet: 1 tab(s) orally once a day (04 Nov 2022 15:16)  folic acid 1 mg oral tablet: 1 tab(s) orally once a day (04 Nov 2022 15:16)  omeprazole 20 mg oral delayed release capsule: 1 cap(s) orally prn (04 Nov 2022 15:16)  Sodium Chloride 1 g oral tablet: daily (04 Nov 2022 15:16)    PAST MEDICAL & SURGICAL HISTORY:  ANCA-associated vasculitis      Anxiety and depression      Pulmonary embolism  2021      DVT, lower extremity  2021      GERD (gastroesophageal reflux disease)      Hyperlipidemia      Lung mass  s/p left wedge resection      DLBCL (diffuse large B cell lymphoma)      History of appendectomy      Lung nodule  pt had wedge resection in 2021      Non-Hodgkins lymphoma  chemoport inserted in 2021        Vital Signs Last 24 Hrs  T(C): 36.4 (11 Nov 2022 04:00), Max: 36.6 (10 Nov 2022 10:05)  T(F): 97.6 (11 Nov 2022 04:00), Max: 97.8 (10 Nov 2022 10:05)  HR: 66 (11 Nov 2022 09:00) (59 - 91)  BP: 120/45 (11 Nov 2022 09:00) (101/50 - 144/56)  BP(mean): 65 (11 Nov 2022 09:00) (60 - 86)  RR: 17 (11 Nov 2022 09:00) (12 - 21)  SpO2: 96% (11 Nov 2022 09:00) (92% - 100%)    Parameters below as of 11 Nov 2022 09:00  Patient On (Oxygen Delivery Method): room air      I&O's Detail    10 Nov 2022 07:01  -  11 Nov 2022 07:00  --------------------------------------------------------  IN:    IV PiggyBack: 250 mL    Lactated Ringers: 480 mL  Total IN: 730 mL    OUT:    Chest Tube (mL): 60 mL    Voided (mL): 300 mL  Total OUT: 360 mL    Total NET: 370 mL        CAPILLARY BLOOD GLUCOSE        Home Medications:  citalopram 10 mg oral tablet: 1 tab(s) orally once a day (04 Nov 2022 15:16)  ezetimibe 10 mg oral tablet: 1 tab(s) orally once a day (04 Nov 2022 15:16)  folic acid 1 mg oral tablet: 1 tab(s) orally once a day (04 Nov 2022 15:16)  omeprazole 20 mg oral delayed release capsule: 1 cap(s) orally prn (04 Nov 2022 15:16)  Sodium Chloride 1 g oral tablet: daily (04 Nov 2022 15:16)    MEDICATIONS  (STANDING):  acetaminophen   IVPB .. 750 milliGRAM(s) IV Intermittent once  acetaminophen   IVPB .. 750 milliGRAM(s) IV Intermittent once  citalopram 10 milliGRAM(s) Oral daily  fluticasone propionate 50 MICROgram(s)/spray Nasal Spray 1 Spray(s) Both Nostrils two times a day  folic acid 1 milliGRAM(s) Oral daily  heparin   Injectable 5000 Unit(s) SubCutaneous every 12 hours  lactated ringers. 1000 milliLiter(s) (30 mL/Hr) IV Continuous <Continuous>  lidocaine   4% Patch 1 Patch Transdermal daily  pantoprazole    Tablet 40 milliGRAM(s) Oral before breakfast  polyethylene glycol 3350 17 Gram(s) Oral daily  senna 1 Tablet(s) Oral daily  sodium chloride 1 Gram(s) Oral daily  tobramycin 0.3% Ophthalmic Solution 1 Drop(s) Right EYE every 6 hours    MEDICATIONS  (PRN):  HYDROmorphone   Tablet 1 milliGRAM(s) Oral every 3 hours PRN Moderate-Severe Pain (4 - 10)  HYDROmorphone  Injectable 0.2 milliGRAM(s) IV Push every 3 hours PRN Severe Breakthrough Pain (7 - 10)  naloxone Injectable 0.1 milliGRAM(s) IV Push every 3 minutes PRN For ANY of the following changes in patient status:  A. RR LESS THAN 10 breaths per minute, B. Oxygen saturation LESS THAN 90%, C. Sedation score of 6  ondansetron Injectable 4 milliGRAM(s) IV Push every 6 hours PRN Nausea        Physical exam:                             General:               Pt is awake, alert,  appears to be in pain but not in distress                                                  Neuro:                  Nonfocal                             Psych:                   A&Ox3                          Cardiovascular:   S1 & S2, regular / irregular                          Respiratory:         Air entry is fair and equal on both sides, has bilateral conducted sounds                           GI:                          Soft, nondistended and nontender, Bowel sounds active                            Ext:                        No cyanosis or edema     Labs:                                                                           9.4    8.36  )-----------( 240      ( 11 Nov 2022 04:15 )             30.7             11-11    132<L>  |  100  |  21  ----------------------------<  147<H>  4.8   |  21<L>  |  0.75    Ca    7.9<L>      11 Nov 2022 04:15  Phos  4.6     11-11  Mg     1.90     11-11    TPro  6.8  /  Alb  2.7<L>  /  TBili  0.9  /  DBili  x   /  AST  18  /  ALT  10  /  AlkPhos  79  11-09                  PT/INR - ( 10 Nov 2022 07:44 )   PT: 17.1 sec;   INR: 1.47 ratio         PTT - ( 10 Nov 2022 07:44 )  PTT:36.9 sec  LIVER FUNCTIONS - ( 09 Nov 2022 12:45 )  Alb: 2.7 g/dL / Pro: 6.8 g/dL / ALK PHOS: 79 U/L / ALT: 10 U/L / AST: 18 U/L / GGT: x                 CXR:  < from: Xray Chest 1 View AP/PA (11.11.22 @ 05:44) >  11/10/2022 at 5:15 PM findings:  The heart size is normal. Left chest wall Mediport with tip at cavoatrial   junction.  Interval insertion of a right-sided chest tube. Chain suture line is   utilized. There is some postsurgical atelectatic changes near the suture   line. Left chain suture line from previous surgery is visualized.  There is no pneumothorax or pleural effusion.  There is generalized osteopenia.    11/11/2020 2:27 AM findings:  Heart size is normal. No changes in position of right-sided chest tube or   left chest wall Mediport. Developing bibasilar opacities likely   atelectasis.  No pneumothorax or pleural effusion.    IMPRESSION: Status post right thoracotomy with chest tube.      Plan:  General: 81yFemale s/p R VATS RUL wedge, MLND  10-Nov-2022, experiencing  pain with deep breathing.                             Neuro:                                         Pain control with Oxy /  Tylenol PRN    Anxiety and depression - On Citalopram                            Cardiovascular:                                          Telemetry (medical test) - Reviewed by me today independently. Normal sinus rhythm.      HLD: Start Lipitor                                        Continue hemodynamic monitoring to prevent decompensation.                            Respiratory:                                         Postop hypoxemia - Resolved, on RA and looks comfortable.                                              CXR is clear. Encourage incentive spirometry.                                                   Chest PT and frequent suctioning. Continue bronchodilators, Pulmozyme and inhaled 3% saline inhalations.                                                      OOB to chair & ambulate w/ assistance.                                                           Continuous pulse oximetry for support & to prevent decompensation.                                         Monitor chest tube output                                         Chest tube to water seal, to be d/cd later today                                                                                         GI                                         On puree diet, advance to DASH  diet as tolerated                                         Continue Zofran / Reglan for nausea - PRN                                         Continue bowel regimen	                                                                 Renal:                                         Continue LR  30cc/hr                                         Monitor I/Os and electrolytes                                                                                        Hem/ Onc:                                         DVT prophylaxis with SQ Heparin and SCDs     Hx of DVT / PE: No active respiratory issues now                                         Monitor chest tube output &  signs of bleeding.                                          Follow CBC in AM                           Infectious disease:                                            Monitor for fever / leukocytosis.                                          All surgical incision / chest tube  sites look clean                            Endocrine                                             Continue Accu-Checks with coverage                                                  Pertinent clinical, laboratory, radiographic, hemodynamic, echocardiographic, respiratory data, microbiologic data and chart were reviewed and analyzed frequently throughout the course of the day and night.     Patient seen, examined and plan discussed with CT Surgeon Dr. Pettit  / CTICU team during rounds.    OOB to chair and ambulate with physical therapy as tolerated.     Status discussed with patient and updated plan of care.     I have spent 40 minutes with this patient including 20 minutes of time coordinating care in the ICU.            Isra Garber MD

## 2022-11-11 NOTE — PROGRESS NOTE ADULT - SUBJECTIVE AND OBJECTIVE BOX
SUBJECTIVE/ OVERNIGHT EVENTS:  Pt seen and examined at bedside in CT-ICU.   Feeling better. pain controlled. s/p Tylenol  chest tube already removed.  stable on room air.  Serial xrays done.   No chest pain, no shortness of breath.   No overnight event.   No N/V/D. No abdominal pain. +BM (states brown color).  no melena, no hematochezia. no BRBPR.     --------------------------------------------------------------------------------------------  LABS:                        9.4    8.36  )-----------( 240      ( 11 Nov 2022 04:15 )             30.7     11-11    132<L>  |  100  |  21  ----------------------------<  147<H>  4.8   |  21<L>  |  0.75    Ca    7.9<L>      11 Nov 2022 04:15  Phos  4.6     11-11  Mg     1.90     11-11    TPro  6.8  /  Alb  2.7<L>  /  TBili  0.9  /  DBili  x   /  AST  18  /  ALT  10  /  AlkPhos  79  11-09    PT/INR - ( 10 Nov 2022 07:44 )   PT: 17.1 sec;   INR: 1.47 ratio         PTT - ( 10 Nov 2022 07:44 )  PTT:36.9 sec  CAPILLARY BLOOD GLUCOSE                RADIOLOGY & ADDITIONAL TESTS:    Imaging Personally Reviewed:  [x] YES  [ ] NO    Consultant(s) Notes Reviewed:  [x] YES  [ ] NO    MEDICATIONS  (STANDING):  acetaminophen   IVPB .. 750 milliGRAM(s) IV Intermittent once  citalopram 10 milliGRAM(s) Oral daily  fluticasone propionate 50 MICROgram(s)/spray Nasal Spray 1 Spray(s) Both Nostrils two times a day  folic acid 1 milliGRAM(s) Oral daily  heparin   Injectable 5000 Unit(s) SubCutaneous every 12 hours  lidocaine   4% Patch 1 Patch Transdermal daily  pantoprazole    Tablet 40 milliGRAM(s) Oral before breakfast  polyethylene glycol 3350 17 Gram(s) Oral daily  senna 1 Tablet(s) Oral daily  sodium chloride 1 Gram(s) Oral daily    MEDICATIONS  (PRN):  HYDROmorphone   Tablet 1 milliGRAM(s) Oral every 3 hours PRN Moderate-Severe Pain (4 - 10)  HYDROmorphone  Injectable 0.2 milliGRAM(s) IV Push every 3 hours PRN Severe Breakthrough Pain (7 - 10)  naloxone Injectable 0.1 milliGRAM(s) IV Push every 3 minutes PRN For ANY of the following changes in patient status:  A. RR LESS THAN 10 breaths per minute, B. Oxygen saturation LESS THAN 90%, C. Sedation score of 6  ondansetron Injectable 4 milliGRAM(s) IV Push every 6 hours PRN Nausea      Care Discussed with Consultants/Other Providers [x] YES  [ ] NO    Vital Signs Last 24 Hrs  T(C): 36.4 (11 Nov 2022 04:00), Max: 36.5 (10 Nov 2022 16:00)  T(F): 97.6 (11 Nov 2022 04:00), Max: 97.7 (10 Nov 2022 16:00)  HR: 66 (11 Nov 2022 09:00) (62 - 91)  BP: 120/45 (11 Nov 2022 09:00) (101/50 - 144/56)  BP(mean): 65 (11 Nov 2022 09:00) (60 - 86)  RR: 17 (11 Nov 2022 09:00) (12 - 21)  SpO2: 96% (11 Nov 2022 09:00) (92% - 100%)    Parameters below as of 11 Nov 2022 09:00  Patient On (Oxygen Delivery Method): room air      I&O's Summary    10 Nov 2022 07:01  -  11 Nov 2022 07:00  --------------------------------------------------------  IN: 730 mL / OUT: 360 mL / NET: 370 mL          PHYSICAL EXAM:  GENERAL: NAD, thin-elderly, comfortable  HEAD:  Atraumatic, Normocephalic  EYES: EOMI, PERRLA, conjunctiva and sclera clear  NECK: Supple, No JVD  CHEST/LUNG: mild decrease breath sounds bilaterally; No wheeze   HEART: Regular rate and rhythm; No murmurs, rubs, or gallops  ABDOMEN: Soft, Nontender, Nondistended; Bowel sounds present  Neuro: AAOx3, no focal weakness   EXTREMITIES:  2+ Peripheral Pulses, No clubbing, cyanosis, or edema  SKIN: No rashes or lesions

## 2022-11-11 NOTE — CHART NOTE - NSCHARTNOTEFT_GEN_A_CORE
Chart reviewed. ANCA serologies back, with negative ANCA, PR3. Wedge biopsy performed. Recommend waiting for biopsy results before starting steroids. Biopsy show be back early next week.     Stanislav Saenz MD

## 2022-11-11 NOTE — PROGRESS NOTE ADULT - SUBJECTIVE AND OBJECTIVE BOX
Anesthesia Pain Management Service    SUBJECTIVE: Patient is doing well with IV PCA and no significant problems reported.    Pain Scale Score	At rest: ___ 	With Activity: ___ 	[X ] Refer to charted pain scores    THERAPY:    [ ] IV PCA Morphine		[ ] 5 mg/mL	[ ] 1 mg/mL  [X ] IV PCA Hydromorphone	[ ] 5 mg/mL	[X ] 1 mg/mL  [ ] IV PCA Fentanyl		[ ] 50 micrograms/mL    Demand dose __0.2_ lockout __6_ (minutes) Continuous Rate _0__ Total: __0.8_   mg used (in past 24 hrs)      MEDICATIONS  (STANDING):  acetaminophen   IVPB .. 750 milliGRAM(s) IV Intermittent once  citalopram 10 milliGRAM(s) Oral daily  fluticasone propionate 50 MICROgram(s)/spray Nasal Spray 1 Spray(s) Both Nostrils two times a day  folic acid 1 milliGRAM(s) Oral daily  heparin   Injectable 5000 Unit(s) SubCutaneous every 12 hours  lactated ringers. 1000 milliLiter(s) (30 mL/Hr) IV Continuous <Continuous>  lidocaine   4% Patch 1 Patch Transdermal daily  pantoprazole    Tablet 40 milliGRAM(s) Oral before breakfast  polyethylene glycol 3350 17 Gram(s) Oral daily  senna 1 Tablet(s) Oral daily  sodium chloride 1 Gram(s) Oral daily  tobramycin 0.3% Ophthalmic Solution 1 Drop(s) Right EYE every 6 hours    MEDICATIONS  (PRN):  HYDROmorphone   Tablet 1 milliGRAM(s) Oral every 3 hours PRN Moderate-Severe Pain (4 - 10)  HYDROmorphone  Injectable 0.2 milliGRAM(s) IV Push every 3 hours PRN Severe Breakthrough Pain (7 - 10)  naloxone Injectable 0.1 milliGRAM(s) IV Push every 3 minutes PRN For ANY of the following changes in patient status:  A. RR LESS THAN 10 breaths per minute, B. Oxygen saturation LESS THAN 90%, C. Sedation score of 6  ondansetron Injectable 4 milliGRAM(s) IV Push every 6 hours PRN Nausea      OBJECTIVE: Patient sitting up in chair, chest tubex1.    Sedation Score:	[ X] Alert	[ ] Drowsy 	[ ] Arousable	[ ] Asleep	[ ] Unresponsive    Side Effects:	[X ] None	[ ] Nausea	[ ] Vomiting	[ ] Pruritus  		[ ] Other:    Vital Signs Last 24 Hrs  T(C): 36.4 (11 Nov 2022 04:00), Max: 36.6 (10 Nov 2022 10:05)  T(F): 97.6 (11 Nov 2022 04:00), Max: 97.8 (10 Nov 2022 10:05)  HR: 66 (11 Nov 2022 09:00) (59 - 91)  BP: 120/45 (11 Nov 2022 09:00) (101/50 - 144/56)  BP(mean): 65 (11 Nov 2022 09:00) (60 - 86)  RR: 17 (11 Nov 2022 09:00) (12 - 21)  SpO2: 96% (11 Nov 2022 09:00) (92% - 100%)    Parameters below as of 11 Nov 2022 09:00  Patient On (Oxygen Delivery Method): room air        ASSESSMENT/ PLAN    Therapy to  be:	[ ] Continue   [ X] Discontinued   [X ] Change to prn Analgesics    Documentation and Verification of current medications:   [X] Done	[ ] Not done, not elligible    Comments: Discussed patient with primary team, IV PCA discontinued. PRN Oral/IV opioids and/or Adjuvant non-opioid medication to be ordered at this point.    Progress Note written now but Patient was seen earlier.

## 2022-11-11 NOTE — PROGRESS NOTE ADULT - NSPROGADDITIONALINFOA_GEN_ALL_CORE
d/w pt and Dr. Pettit.  d/w CT-ICU team.     Pt of PCP Dr. Crystal Santiago at [facility].     - Dr. MARY Arias ([facility])  - [phone]

## 2022-11-12 NOTE — CONSULT NOTE ADULT - ASSESSMENT
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 Patient states that recently she has been feeling very fatigued. She states that moving around her house, or even to the bathroom, has been causing her to get very tired and causes her some minor chest pain, which resolves quickly with rest. She states that her breathing has been non-labored, denies dyspnea, shortness of breath, inability to catch her breath. She states that she has also not had much of an appetite over the last month and has been eating mostly soft foods. She admits to about a 5lb weight loss over the last month. She admits to a minor cough that occasionally occurs in fits and makes her feel as if she may vomit. Patient admits to some nausea and aversion to food but denies mechanical issues, inability to eat or feeling of choking. Denies hematemesis, hemoptysis.   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   Prior to 11/11 she had LR and sodium dropped to 132  Off IVF now improved  Na(137)/K(4.7)/Cl(104)/HCO3(22)/BUN(30)/Cr(0.77)Glu(91)/Ca(8.0)/Mg(2.30)/PO4(2.7)    11-12 @ 05:59  Na(132)/K(4.8)/Cl(100)/HCO3(21)/BUN(21)/Cr(0.75)Glu(147)/Ca(7.9)/Mg(1.90)/PO4(4.6)    11-11 @ 04:15  Na(135)/K(3.8)/Cl(103)/HCO3(22)/BUN(13)/Cr(0.59)Glu(92)/Ca(7      Anemia  Defer to hematology      ANCA-associated vasculitis  Defer                        9.3    11.70 )-----------( 355      ( 12 Nov 2022 05:59 )             30.9

## 2022-11-12 NOTE — CONSULT NOTE ADULT - SUBJECTIVE AND OBJECTIVE BOX
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 Patient states that recently she has been feeling very fatigued. She states that moving around her house, or even to the bathroom, has been causing her to get very tired and causes her some minor chest pain, which resolves quickly with rest. She states that her breathing has been non-labored, denies dyspnea, shortness of breath, inability to catch her breath. She states that she has also not had much of an appetite over the last month and has been eating mostly soft foods. She admits to about a 5lb weight loss over the last month. She admits to a minor cough that occasionally occurs in fits and makes her feel as if she may vomit. Patient admits to some nausea and aversion to food but denies mechanical issues, inability to eat or feeling of choking. Denies hematemesis, hemoptysis. Nephrology consulted for hyponatremia   (09 Nov 2022 12:31)        Allergies:  codeine (Nausea)  doxycycline (Nausea)  penicillin (Hives)      PAST MEDICAL & SURGICAL HISTORY:  ANCA-associated vasculitis      Anxiety and depression      Pulmonary embolism  2021      DVT, lower extremity  2021      GERD (gastroesophageal reflux disease)      Hyperlipidemia      Lung mass  s/p left wedge resection      DLBCL (diffuse large B cell lymphoma)      History of appendectomy      Lung nodule  pt had wedge resection in 2021      Non-Hodgkins lymphoma  chemoport inserted in 2021          Home Medications Reviewed    Hospital Medications:   MEDICATIONS  (STANDING):  acetaminophen   IVPB .. 750 milliGRAM(s) IV Intermittent once  citalopram 10 milliGRAM(s) Oral daily  fluticasone propionate 50 MICROgram(s)/spray Nasal Spray 1 Spray(s) Both Nostrils two times a day  folic acid 1 milliGRAM(s) Oral daily  heparin   Injectable 5000 Unit(s) SubCutaneous every 12 hours  lidocaine   4% Patch 1 Patch Transdermal daily  pantoprazole    Tablet 40 milliGRAM(s) Oral before breakfast  polyethylene glycol 3350 17 Gram(s) Oral daily  senna 1 Tablet(s) Oral daily  sodium chloride 1 Gram(s) Oral daily      SOCIAL HISTORY:  Denies ETOh, Smoking,     FAMILY HISTORY:  FH: lung cancer (Sibling)    FH: CAD (coronary artery disease) (Sibling)        REVIEW OF SYSTEMS:  CONSTITUTIONAL: No weakness, fevers or chills  EYES/ENT: No visual changes;  No vertigo or throat pain   NECK: No pain or stiffness  RESPIRATORY: No cough, wheezing, hemoptysis; No shortness of breath  CARDIOVASCULAR: No chest pain or palpitations.  GASTROINTESTINAL: No abdominal or epigastric pain. No nausea, vomiting, or hematemesis; No diarrhea or constipation. No melena or hematochezia.  GENITOURINARY: No dysuria, frequency, foamy urine, urinary urgency, incontinence or hematuria  NEUROLOGICAL: No numbness or weakness  SKIN: No itching, burning, rashes, or lesions   VASCULAR: No bilateral lower extremity edema.   All other review of systems is negative unless indicated above.    VITALS:  T(F): 98.1 (11-12-22 @ 21:12), Max: 98.1 (11-12-22 @ 21:12)  HR: 84 (11-12-22 @ 21:12)  BP: 122/39 (11-12-22 @ 21:12)  RR: 18 (11-12-22 @ 21:12)  SpO2: 91% (11-12-22 @ 21:12)  Wt(kg): --    11-11 @ 07:01  -  11-12 @ 07:00  --------------------------------------------------------  IN: 770 mL / OUT: 850 mL / NET: -80 mL    11-12 @ 07:01  -  11-12 @ 21:55  --------------------------------------------------------  IN: 1040 mL / OUT: 550 mL / NET: 490 mL          PHYSICAL EXAM:  General: NAD  HEENT: anicteric sclera, oropharynx clear, MMM  Neck: No JVD  Respiratory: CTAB, no wheezes, rales or rhonchi  Cardiovascular: S1, S2, RRR  Gastrointestinal: BS+, soft, NT/ND  Extremities: No cyanosis or clubbing. No peripheral edema  Neurological: A/O x 3, no focal deficits  Psychiatric: Normal mood, normal affect  : No CVA tenderness. No lopez.   Skin: No rashes      LABS:  11-12    137  |  104  |  30<H>  ----------------------------<  91  4.7   |  22  |  0.77    Ca    8.0<L>      12 Nov 2022 05:59  Phos  2.7     11-12  Mg     2.30     11-12    TPro  5.9<L>  /  Alb  2.4<L>  /  TBili  0.5  /  DBili      /  AST  16  /  ALT  7   /  AlkPhos  56  11-12    Creatinine Trend: 0.77 <--, 0.75 <--, 0.59 <--, 0.78 <--                        9.3    11.70 )-----------( 355      ( 12 Nov 2022 05:59 )             30.9     Urine Studies:        RADIOLOGY & ADDITIONAL STUDIES:

## 2022-11-12 NOTE — PROGRESS NOTE ADULT - SUBJECTIVE AND OBJECTIVE BOX
Patient seen at bedside by Thoracic, resting comfortably in bed, in NAD.  States she is feeling better since having the tube come out. Admits to still feeling a bit tired and sore but states she is okay otherwise.  Is currently deciding on going to rehab.  Denies any other acute overnight events or active complaints at this time.    Vital Signs:  Vital Signs Last 24 Hrs  T(C): 36.4 (11-12-22 @ 04:45), Max: 36.4 (11-11-22 @ 12:00)  T(F): 97.6 (11-12-22 @ 04:45), Max: 97.6 (11-11-22 @ 12:00)  HR: 88 (11-12-22 @ 04:45) (68 - 88)  BP: 104/42 (11-12-22 @ 04:45) (100/33 - 112/43)  RR: 19 (11-12-22 @ 04:45) (16 - 25)  SpO2: 99% (11-12-22 @ 04:45) (94% - 100%)     Constitutional: NAD  Eyes: EOMI, sclera non-icteric  Neck: supple  Respiratory: CTAB, no wheezes or crackles   Cardiovascular: RRR  Gastrointestinal: soft, NTND, + BS  Extremities: no cyanosis, clubbing or edema   Neurological: awake and alert  Tubes: out. site c/d/i      Relevant labs, radiology and Medications reviewed                        9.3    11.70 )-----------( 355      ( 12 Nov 2022 05:59 )             30.9     11-12    137  |  104  |  30<H>  ----------------------------<  91  4.7   |  22  |  0.77    Ca    8.0<L>      12 Nov 2022 05:59  Phos  2.7     11-12  Mg     2.30     11-12    TPro  5.9<L>  /  Alb  2.4<L>  /  TBili  0.5  /  DBili  x   /  AST  16  /  ALT  7   /  AlkPhos  56  11-12      MEDICATIONS  (STANDING):  acetaminophen   IVPB .. 750 milliGRAM(s) IV Intermittent once  citalopram 10 milliGRAM(s) Oral daily  fluticasone propionate 50 MICROgram(s)/spray Nasal Spray 1 Spray(s) Both Nostrils two times a day  folic acid 1 milliGRAM(s) Oral daily  heparin   Injectable 5000 Unit(s) SubCutaneous every 12 hours  lidocaine   4% Patch 1 Patch Transdermal daily  pantoprazole    Tablet 40 milliGRAM(s) Oral before breakfast  polyethylene glycol 3350 17 Gram(s) Oral daily  senna 1 Tablet(s) Oral daily  sodium chloride 1 Gram(s) Oral daily    MEDICATIONS  (PRN):  HYDROmorphone   Tablet 1 milliGRAM(s) Oral every 3 hours PRN Moderate-Severe Pain (4 - 10)  HYDROmorphone  Injectable 0.2 milliGRAM(s) IV Push every 3 hours PRN Severe Breakthrough Pain (7 - 10)  naloxone Injectable 0.1 milliGRAM(s) IV Push every 3 minutes PRN For ANY of the following changes in patient status:  A. RR LESS THAN 10 breaths per minute, B. Oxygen saturation LESS THAN 90%, C. 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 Patient admitted to be optimized for her surgical procedure.  11/12: s/p RUL Wedge on 11/10    Plan:  -Tube is out, patient stable per Thoracic   -Cont. Tobramycin eyedrops for corneal abrasion  -Pt deciding between Rehab and home  -Ok for discharge when primary team deems ready.  -No further Thoracic interventions necessary.  -Contact with concerns.

## 2022-11-12 NOTE — PROGRESS NOTE ADULT - NSPROGADDITIONALINFOA_GEN_ALL_CORE
d/w pt and Dr. Pettit.  d/w CT-ICU team.     undecided about home vs rehab.  Lives alone. PT recommends rehab.   risk of prolonged hospitalization explained.  fall precautions discussed.    Pt of PCP Dr. Crystal Santiago at [facility].     - Dr. MARY Arias ([facility])  - [phone]

## 2022-11-12 NOTE — PROGRESS NOTE ADULT - SUBJECTIVE AND OBJECTIVE BOX
SUBJECTIVE/ OVERNIGHT EVENTS:  feels weak  no cp, no sob, no n/v/d. no abdominal pain.  no headache, no dizziness.   undecided about home vs rehab.  lives alone  the son helpful  out of bed to chair today with PCA's help.       --------------------------------------------------------------------------------------------  LABS:                        9.3    11.70 )-----------( 355      ( 12 Nov 2022 05:59 )             30.9     11-12    137  |  104  |  30<H>  ----------------------------<  91  4.7   |  22  |  0.77    Ca    8.0<L>      12 Nov 2022 05:59  Phos  2.7     11-12  Mg     2.30     11-12    TPro  5.9<L>  /  Alb  2.4<L>  /  TBili  0.5  /  DBili  x   /  AST  16  /  ALT  7   /  AlkPhos  56  11-12      CAPILLARY BLOOD GLUCOSE                RADIOLOGY & ADDITIONAL TESTS:    Imaging Personally Reviewed:  [x] YES  [ ] NO    Consultant(s) Notes Reviewed:  [x] YES  [ ] NO    MEDICATIONS  (STANDING):  acetaminophen   IVPB .. 750 milliGRAM(s) IV Intermittent once  citalopram 10 milliGRAM(s) Oral daily  fluticasone propionate 50 MICROgram(s)/spray Nasal Spray 1 Spray(s) Both Nostrils two times a day  folic acid 1 milliGRAM(s) Oral daily  heparin   Injectable 5000 Unit(s) SubCutaneous every 12 hours  lidocaine   4% Patch 1 Patch Transdermal daily  pantoprazole    Tablet 40 milliGRAM(s) Oral before breakfast  polyethylene glycol 3350 17 Gram(s) Oral daily  senna 1 Tablet(s) Oral daily  sodium chloride 1 Gram(s) Oral daily    MEDICATIONS  (PRN):  HYDROmorphone   Tablet 1 milliGRAM(s) Oral every 3 hours PRN Moderate-Severe Pain (4 - 10)  HYDROmorphone  Injectable 0.2 milliGRAM(s) IV Push every 3 hours PRN Severe Breakthrough Pain (7 - 10)  naloxone Injectable 0.1 milliGRAM(s) IV Push every 3 minutes PRN For ANY of the following changes in patient status:  A. RR LESS THAN 10 breaths per minute, B. Oxygen saturation LESS THAN 90%, C. Sedation score of 6  ondansetron Injectable 4 milliGRAM(s) IV Push every 6 hours PRN Nausea      Care Discussed with Consultants/Other Providers [x] YES  [ ] NO    Vital Signs Last 24 Hrs  T(C): 36.5 (12 Nov 2022 16:29), Max: 36.5 (12 Nov 2022 09:20)  T(F): 97.7 (12 Nov 2022 16:29), Max: 97.7 (12 Nov 2022 09:20)  HR: 81 (12 Nov 2022 16:29) (70 - 88)  BP: 109/39 (12 Nov 2022 16:29) (100/33 - 114/75)  BP(mean): --  RR: 18 (12 Nov 2022 16:29) (17 - 20)  SpO2: 96% (12 Nov 2022 16:29) (94% - 99%)    Parameters below as of 12 Nov 2022 16:29  Patient On (Oxygen Delivery Method): room air      I&O's Summary    11 Nov 2022 07:01  -  12 Nov 2022 07:00  --------------------------------------------------------  IN: 770 mL / OUT: 850 mL / NET: -80 mL    12 Nov 2022 07:01  -  12 Nov 2022 18:23  --------------------------------------------------------  IN: 1040 mL / OUT: 550 mL / NET: 490 mL        PHYSICAL EXAM:  GENERAL: NAD, thin-elderly, comfortable on room air  HEAD:  Atraumatic, Normocephalic  EYES: EOMI, PERRLA, conjunctiva and sclera clear  NECK: Supple, No JVD  CHEST/LUNG: mild decrease breath sounds bilaterally; No wheeze   HEART: Regular rate and rhythm; No murmurs, rubs, or gallops  ABDOMEN: Soft, Nontender, Nondistended; Bowel sounds present  Neuro: AAOx3, no focal weakness   EXTREMITIES:  2+ Peripheral Pulses, No clubbing, cyanosis, or edema  SKIN: No rashes or lesions

## 2022-11-13 NOTE — PROGRESS NOTE ADULT - SUBJECTIVE AND OBJECTIVE BOX
SUBJECTIVE/ OVERNIGHT EVENTS:  more energy today  the daughter and son-in-law at bedside  eating a little more  no cp, no sob, no n/v/d. no abdominal pain.  no headache, no dizziness.   she decided she wants to go to rehab.  lives alone      --------------------------------------------------------------------------------------------  LABS:                        9.0    7.06  )-----------( 298      ( 13 Nov 2022 06:10 )             30.0     11-13    137  |  106  |  20  ----------------------------<  75  4.4   |  20<L>  |  0.59    Ca    7.7<L>      13 Nov 2022 06:10  Phos  2.3     11-13  Mg     2.00     11-13    TPro  5.9<L>  /  Alb  2.3<L>  /  TBili  0.4  /  DBili  x   /  AST  20  /  ALT  8   /  AlkPhos  59  11-13      CAPILLARY BLOOD GLUCOSE                RADIOLOGY & ADDITIONAL TESTS:    Imaging Personally Reviewed:  [x] YES  [ ] NO    Consultant(s) Notes Reviewed:  [x] YES  [ ] NO    MEDICATIONS  (STANDING):  acetaminophen   IVPB .. 750 milliGRAM(s) IV Intermittent once  citalopram 10 milliGRAM(s) Oral daily  fluticasone propionate 50 MICROgram(s)/spray Nasal Spray 1 Spray(s) Both Nostrils two times a day  folic acid 1 milliGRAM(s) Oral daily  heparin   Injectable 5000 Unit(s) SubCutaneous every 12 hours  lidocaine   4% Patch 1 Patch Transdermal daily  pantoprazole    Tablet 40 milliGRAM(s) Oral before breakfast  polyethylene glycol 3350 17 Gram(s) Oral daily  senna 1 Tablet(s) Oral daily  sodium chloride 1 Gram(s) Oral daily    MEDICATIONS  (PRN):  HYDROmorphone   Tablet 1 milliGRAM(s) Oral every 3 hours PRN Moderate-Severe Pain (4 - 10)  HYDROmorphone  Injectable 0.2 milliGRAM(s) IV Push every 3 hours PRN Severe Breakthrough Pain (7 - 10)  naloxone Injectable 0.1 milliGRAM(s) IV Push every 3 minutes PRN For ANY of the following changes in patient status:  A. RR LESS THAN 10 breaths per minute, B. Oxygen saturation LESS THAN 90%, C. Sedation score of 6  ondansetron Injectable 4 milliGRAM(s) IV Push every 6 hours PRN Nausea      Care Discussed with Consultants/Other Providers [x] YES  [ ] NO    Vital Signs Last 24 Hrs  T(C): 36.9 (13 Nov 2022 13:12), Max: 36.9 (13 Nov 2022 13:12)  T(F): 98.5 (13 Nov 2022 13:12), Max: 98.5 (13 Nov 2022 13:12)  HR: 91 (13 Nov 2022 13:12) (81 - 91)  BP: 113/63 (13 Nov 2022 13:12) (109/39 - 137/48)  BP(mean): --  RR: 19 (13 Nov 2022 13:12) (18 - 19)  SpO2: 100% (13 Nov 2022 13:12) (91% - 100%)    Parameters below as of 13 Nov 2022 09:00  Patient On (Oxygen Delivery Method): room air      I&O's Summary    12 Nov 2022 07:01  -  13 Nov 2022 07:00  --------------------------------------------------------  IN: 1040 mL / OUT: 550 mL / NET: 490 mL          PHYSICAL EXAM:  GENERAL: NAD, thin-elderly, comfortable on room air  HEAD:  Atraumatic, Normocephalic  EYES: EOMI, PERRLA, conjunctiva and sclera clear  NECK: Supple, No JVD  CHEST/LUNG: mild decrease breath sounds bilaterally; No wheeze   HEART: Regular rate and rhythm; No murmurs, rubs, or gallops  ABDOMEN: Soft, Nontender, Nondistended; Bowel sounds present  Neuro: AAOx3, no focal weakness   EXTREMITIES:  2+ Peripheral Pulses, No clubbing, cyanosis, or edema  SKIN: No rashes or lesions

## 2022-11-13 NOTE — PROGRESS NOTE ADULT - ASSESSMENT
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 Patient states that recently she has been feeling very fatigued. She states that moving around her house, or even to the bathroom, has been causing her to get very tired and causes her some minor chest pain, which resolves quickly with rest. She states that her breathing has been non-labored, denies dyspnea, shortness of breath, inability to catch her breath. She states that she has also not had much of an appetite over the last month and has been eating mostly soft foods. She admits to about a 5lb weight loss over the last month. She admits to a minor cough that occasionally occurs in fits and makes her feel as if she may vomit. Patient admits to some nausea and aversion to food but denies mechanical issues, inability to eat or feeling of choking. Denies hematemesis, hemoptysis.   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   Prior to 11/11 she had LR and sodium dropped to 132  Off IVF now improved    Hypophosphatemia  Will need to be supplemented  Check phosphorus daily.     Anemia  Defer to hematology      ANCA-associated vasculitis  Defer

## 2022-11-13 NOTE — PROGRESS NOTE ADULT - SUBJECTIVE AND OBJECTIVE BOX
[facility]   MD RONEL FRIAS MD KRISTINE SOLTANPOUR, DO ANGELA WONG, PA    TEL:  OFFICE: [phone]  From 5pm-7am Answering Service [phone]    -- RENAL FOLLOW UP NOTE ---Date of Service 11-13-22 @ 22:14    Patient is a 81y old  Female who presents with a chief complaint of RVATS, RUL Nodule Biopsy w/ IR marking (13 Nov 2022 14:12)      Patient seen and examined at bedside. No chest pain/sob    VITALS:  T(F): 97.9 (11-13-22 @ 20:59), Max: 98.5 (11-13-22 @ 13:12)  HR: 91 (11-13-22 @ 20:59)  BP: 133/41 (11-13-22 @ 20:59)  RR: 18 (11-13-22 @ 20:59)  SpO2: 95% (11-13-22 @ 20:59)  Wt(kg): --    11-12 @ 07:01  -  11-13 @ 07:00  --------------------------------------------------------  IN: 1040 mL / OUT: 550 mL / NET: 490 mL    11-13 @ 07:01  -  11-13 @ 22:14  --------------------------------------------------------  IN: 150 mL / OUT: 600 mL / NET: -450 mL          PHYSICAL EXAM:  General: NAD  Neck: No JVD  Respiratory: CTAB, no wheezes, rales or rhonchi  Cardiovascular: S1, S2, RRR  Gastrointestinal: BS+, soft, NT/ND  Extremities: No peripheral edema    Hospital Medications:   MEDICATIONS  (STANDING):  acetaminophen   IVPB .. 750 milliGRAM(s) IV Intermittent once  citalopram 10 milliGRAM(s) Oral daily  fluticasone propionate 50 MICROgram(s)/spray Nasal Spray 1 Spray(s) Both Nostrils two times a day  folic acid 1 milliGRAM(s) Oral daily  heparin   Injectable 5000 Unit(s) SubCutaneous every 12 hours  lidocaine   4% Patch 1 Patch Transdermal daily  pantoprazole    Tablet 40 milliGRAM(s) Oral before breakfast  polyethylene glycol 3350 17 Gram(s) Oral daily  senna 1 Tablet(s) Oral daily  sodium chloride 1 Gram(s) Oral daily      LABS:  11-13    137  |  106  |  20  ----------------------------<  75  4.4   |  20<L>  |  0.59    Ca    7.7<L>      13 Nov 2022 06:10  Phos  2.3     11-13  Mg     2.00     11-13    TPro  5.9<L>  /  Alb  2.3<L>  /  TBili  0.4  /  DBili      /  AST  20  /  ALT  8   /  AlkPhos  59  11-13    Creatinine Trend: 0.59 <--, 0.77 <--, 0.75 <--, 0.59 <--, 0.78 <--    Albumin, Serum: 2.3 g/dL (11-13 @ 06:10)  Phosphorus Level, Serum: 2.3 mg/dL (11-13 @ 06:10)                              9.0    7.06  )-----------( 298      ( 13 Nov 2022 06:10 )             30.0     Urine Studies:      Iron 97, TIBC <127, %sat --      [11-10-22 @ 06:55]  Ferritin 986      [11-10-22 @ 06:55]  Lipid: chol 84, TG 81, HDL 22, LDL --      [10-01-22 @ 07:10]    HBsAb Nonreact      [11-11-22 @ 04:15]  HBsAg Nonreact      [11-11-22 @ 04:15]  HBcAb Nonreact      [11-11-22 @ 04:15]    ANCA: cANCA Negative, pANCA Negative, atypical ANCA Indeterminate Method interference due to CATHERINE Fluorescence      [11-10-22 @ 06:55]  MPO-ANCA <5.0, interpretation: Negative      [11-10-22 @ 06:55]  PR3-ANCA <5.0, interpretation: Negative      [11-10-22 @ 06:55]    RADIOLOGY & ADDITIONAL STUDIES:

## 2022-11-13 NOTE — PROGRESS NOTE ADULT - NSPROGADDITIONALINFOA_GEN_ALL_CORE
d/w pt and family at bedside.   d/w MARGRET Rangel.     undecided about home vs rehab. Lives alone. PT recommends rehab.   risk of prolonged hospitalization explained.  fall precautions discussed.  Today, she decided she wants to go to rehab, thinking about [facility].   SW/CM follow up for choices.     Pt of PCP Dr. Crystal Santiago at [facility].     - Dr. MARY Arias ([facility])  - [phone]

## 2022-11-13 NOTE — PROGRESS NOTE ADULT - ASSESSMENT
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 Patient states that recently she has been feeling very fatigued.    Fatigue/dyspnea, with hx of Lymphoma: will need to r/o recurrent  - heme/onc following   - Recent TTE on 11/3/22 reviewed: normal LV. outpt card Dr. Ady Cooper  - PT also saw pulm Mack Tucker in the office, with some concern for her overall status. She was hypotensive to systolic 90s in the office.  (now BP improves s/p IVF here).   - Rheum has also been consulted to r/o vasculitis per heme/onc recommendation. labs sent.   - clinically she feels okay without chest pain.   - medically optimized.   - s/p thoracoscopic biopsy of mediastinal lymph node and Lung wedge resection 11/10/22  - f/u path results (pending)   - monitored in CT-ICU post op, remain stable. Transferred to the floor.  - PT: rehab. Pt prefers home PT. Fall precautions discussed.     Anemia  - hgb 8.1 lower than her baseline.  - no melena, no hematochezia. no BRBPR.   - recent Anemia work-up reviewed: normal vit b12, folate.  - repeat iron studies ordered    - s/p 2 units PRBC 11/1/22 per heme's note.   - given 1 unit prbc 11/9/22 with appropriate post transfusion hgb.    - no melena, no hematochezia. no BRBPR.   - will continue to monitor    Leukocytosis  - stable CXR, UA. (no urinary symptoms)  - stable post transfusion hgb.     hx on ANCA+ vasculitis  - no current flair   - rheum eval per heme/onc request given pulm findings on CT chest.     Anxiety and depression  - stable, continue citalopram.  - Pt denied SI/HI ideations, denied visual and auditory hallucinations.     GERD (gastroesophageal reflux disease)  - continue PPI    Hyperlipidemia.   - continue home ezetimibe. okay to hold if nonformulary   - Lipid panel    DVT ppx

## 2022-11-14 NOTE — PROGRESS NOTE ADULT - SUBJECTIVE AND OBJECTIVE BOX
SUBJECTIVE/ OVERNIGHT EVENTS:  Pt seen and examined earlier today.   she feels well  comfortable. out of bed in chair  denied pain.   no cp, no sob, no n/v/d.  no HA, no dizziness.   awaiting rehab.    Later in the evening, PA reported sinus tachycardia with fever.  IV fluid started. sepsis work up initiated.   no leukocytosis, remain stable clinically  repeat labs reviewed. Procal low.  monitoring off abx with low threshold to start abx if any decompensation.      --------------------------------------------------------------------------------------------  LABS:                        8.6    8.07  )-----------( 266      ( 14 Nov 2022 22:00 )             27.9     11-14    136  |  103  |  10  ----------------------------<  118<H>  4.0   |  23  |  0.57    Ca    7.7<L>      14 Nov 2022 22:00  Phos  1.5     11-14  Mg     1.70     11-14    TPro  5.8<L>  /  Alb  2.5<L>  /  TBili  0.5  /  DBili  x   /  AST  18  /  ALT  10  /  AlkPhos  80  11-14    PT/INR - ( 14 Nov 2022 22:00 )   PT: 14.8 sec;   INR: 1.27 ratio         PTT - ( 14 Nov 2022 22:00 )  PTT:38.7 sec  CAPILLARY BLOOD GLUCOSE        CARDIAC MARKERS ( 14 Nov 2022 22:00 )  x     / x     / 16 U/L / x     / <1.0 ng/mL          RADIOLOGY & ADDITIONAL TESTS:    Imaging Personally Reviewed:  [x] YES  [ ] NO    Consultant(s) Notes Reviewed:  [x] YES  [ ] NO    MEDICATIONS  (STANDING):  acetaminophen   IVPB .. 750 milliGRAM(s) IV Intermittent once  citalopram 10 milliGRAM(s) Oral daily  fluticasone propionate 50 MICROgram(s)/spray Nasal Spray 1 Spray(s) Both Nostrils two times a day  folic acid 1 milliGRAM(s) Oral daily  heparin   Injectable 5000 Unit(s) SubCutaneous every 12 hours  lidocaine   4% Patch 1 Patch Transdermal daily  melatonin 3 milliGRAM(s) Oral at bedtime  pantoprazole    Tablet 40 milliGRAM(s) Oral before breakfast  polyethylene glycol 3350 17 Gram(s) Oral daily  potassium phosphate / sodium phosphate Powder (PHOS-NaK) 1 Packet(s) Oral once  senna 1 Tablet(s) Oral daily  sodium chloride 1 Gram(s) Oral daily    MEDICATIONS  (PRN):  HYDROmorphone   Tablet 1 milliGRAM(s) Oral every 3 hours PRN Moderate-Severe Pain (4 - 10)  HYDROmorphone  Injectable 0.2 milliGRAM(s) IV Push every 3 hours PRN Severe Breakthrough Pain (7 - 10)  naloxone Injectable 0.1 milliGRAM(s) IV Push every 3 minutes PRN For ANY of the following changes in patient status:  A. RR LESS THAN 10 breaths per minute, B. Oxygen saturation LESS THAN 90%, C. Sedation score of 6  ondansetron Injectable 4 milliGRAM(s) IV Push every 6 hours PRN Nausea      Care Discussed with Consultants/Other Providers [x] YES  [ ] NO    Vital Signs Last 24 Hrs  T(C): 36.7 (14 Nov 2022 22:26), Max: 38.6 (14 Nov 2022 20:31)  T(F): 98 (14 Nov 2022 22:26), Max: 101.5 (14 Nov 2022 20:31)  HR: 106 (14 Nov 2022 22:26) (90 - 118)  BP: 135/52 (14 Nov 2022 22:26) (103/78 - 145/45)  BP(mean): --  RR: 19 (14 Nov 2022 22:26) (17 - 19)  SpO2: 91% (14 Nov 2022 22:26) (91% - 99%)    Parameters below as of 14 Nov 2022 22:26  Patient On (Oxygen Delivery Method): room air      I&O's Summary    13 Nov 2022 07:01  -  14 Nov 2022 07:00  --------------------------------------------------------  IN: 400 mL / OUT: 900 mL / NET: -500 mL    14 Nov 2022 07:01  -  14 Nov 2022 23:51  --------------------------------------------------------  IN: 480 mL / OUT: 700 mL / NET: -220 mL        PHYSICAL EXAM:  GENERAL: NAD, thin-elderly, comfortable on room air  HEAD:  Atraumatic, Normocephalic  EYES: EOMI, PERRLA, conjunctiva and sclera clear  NECK: Supple, No JVD  CHEST/LUNG: mild decrease breath sounds bilaterally; No wheeze   HEART: Regular rate and rhythm; No murmurs, rubs, or gallops  ABDOMEN: Soft, Nontender, Nondistended; Bowel sounds present  Neuro: AAOx3, no focal weakness   EXTREMITIES:  2+ Peripheral Pulses, No clubbing, cyanosis, or edema  SKIN: No rashes or lesions

## 2022-11-14 NOTE — DIETITIAN INITIAL EVALUATION ADULT - PERTINENT LABORATORY DATA
11-14    137  |  106  |  14  ----------------------------<  93  4.0   |  21<L>  |  0.57    Ca    7.9<L>      14 Nov 2022 06:04  Phos  1.7     11-14  Mg     1.80     11-14    TPro  5.7<L>  /  Alb  2.3<L>  /  TBili  0.5  /  DBili  x   /  AST  19  /  ALT  6   /  AlkPhos  74  11-14

## 2022-11-14 NOTE — PROGRESS NOTE ADULT - ASSESSMENT
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 Patient states that recently she has been feeling very fatigued.    Fatigue/dyspnea, with hx of Lymphoma: will need to r/o recurrent  - heme/onc following   - Recent TTE on 11/3/22 reviewed: normal LV. outpt card Dr. Ady Cooper  - PT also saw pulm Mack Tucker in the office, with some concern for her overall status. She was hypotensive to systolic 90s in the office.  (now BP improves s/p IVF here).   - Rheum has also been consulted to r/o vasculitis per heme/onc recommendation. labs sent.   - clinically she feels okay without chest pain.   - s/p thoracoscopic biopsy of mediastinal lymph node and Lung wedge resection 11/10/22  - f/u path results (pending)   - monitored in CT-ICU post op, remain stable. Transferred to the floor.  - PT: rehab. Pt now agreeable to rehab, awaiting placement    Fever 101F with sinus tachycardia: r/o sepsis vs. tumor fever   - IV fluid started. sepsis work up initiated.   - check UA, CXR. RVP repeat done.  - no leukocytosis, remain stable clinically  - repeat labs reviewed. Procal low.  - monitoring off abx with low threshold to start abx if any decompensation.    Anemia  - hgb 8.1 lower than her baseline.  - no melena, no hematochezia. no BRBPR.   - recent Anemia work-up reviewed: normal vit b12, folate.  - repeat iron studies ordered    - s/p 2 units PRBC 11/1/22 per heme's note.   - given 1 unit prbc 11/9/22 with appropriate post transfusion hgb.    - no melena, no hematochezia. no BRBPR.   - will continue to monitor    Leukocytosis  - stable CXR, UA. (no urinary symptoms)  - stable post transfusion hgb.     hx on ANCA+ vasculitis  - no current flair   - rheum eval per heme/onc request given pulm findings on CT chest.     Anxiety and depression  - stable, continue citalopram.  - Pt denied SI/HI ideations, denied visual and auditory hallucinations.     GERD (gastroesophageal reflux disease)  - continue PPI    Hyperlipidemia.   - continue home ezetimibe. okay to hold if nonformulary   - Lipid panel    DVT ppx

## 2022-11-14 NOTE — DIETITIAN INITIAL EVALUATION ADULT - OTHER INFO
Pt has a history of diffuse large B cell lymphoma in remission, non-hodgkin's lymphoma, depression, HLD, GERD, PE/DVT, ANCA-vasculitis, s/p LVATS, LUIS wedge resection. Pt is s/p RVATS, RUL Nodule Biopsy w/ IR marking.   Pt reports her appetite has been improving since surgery. She reports her appetite was very poor. She states "I just didn't feel like eating". Pt had no complaints of GI distress nor of difficulty chewing or swallowing.  Pt has no known food allergies.  Pt was unable to provide her usual weight.   Pt is amenable to trying Strawberry Ensure Plus HP and Magic Cup.

## 2022-11-14 NOTE — CHART NOTE - NSCHARTNOTEFT_GEN_A_CORE
[facility] NIGHT MEDICINE COVERAGE    Notified by RN that pt is tachycardic to 113 on tele, pt asymptomatic, offering no complaints to RN.  Rectal temp and EKG ordered.  Pt found to be febrile to 101.5, remainder of vitals are stable.  Pt assessed at bedside, pt reports feeling "tired", reports poor sleep, denies any generalized pain, chest pain, palpitations, difficulty breathing, abdominal pain, or weakness.  Pt s/p RUL wedge resection on 11/10/22.    Vital Signs Last 24 Hrs  T(C): 36.7 (14 Nov 2022 22:26), Max: 38.6 (14 Nov 2022 20:31)  T(F): 98 (14 Nov 2022 22:26), Max: 101.5 (14 Nov 2022 20:31)  HR: 106 (14 Nov 2022 22:26) (90 - 118)  BP: 135/52 (14 Nov 2022 22:26) (103/78 - 145/45)  RR: 19 (14 Nov 2022 22:26) (17 - 19)  SpO2: 91% (14 Nov 2022 22:26) (91% - 99%)    O2 Parameters below as of 14 Nov 2022 22:26  Patient On (Oxygen Delivery Method): room air    Physical Exam:  GS: A&Ox3, in NAD, calm  Lungs: Diminished breath sounds over R apex, otherwise CTA b/l, w/o w/r/r  Heart: Tachycardic, +S1S2, w/o m/r/g  Abdomen: Soft, NT/ND, +BS w/o rebound or guarding  Extremities: Warm, well-perfused, no LE edema noted b/l  Neuro: Non-focal, WEINSTEIN x4    Plan:  -SIRS positive  -Sepsis work-up ordered  -Giving 500cc LR @ 100cchr (RN aware of order), pt has mild to moderate mitral stenosis on TTE, use caution w/ IV fluids  -Trend Lactate, procalcitonin  -Vitals q4hrs  -CXR ordered  -UA ordered  -BCx x2 ordered  -RVP and COVID pending, f/u results  -Case d/w attending, Dr. Arias, can hold off on empiric antibiotics for now, should pt's condition change, then start empiric Zosyn.  -Hemodynamically stable presently    Plan d/w pt and RN, all questions answered.  Pt stable at this time, will continue to monitor.    Jorge Hamilton PA-C  Department of Medicine - [facility]  In-House Pager: #[phone] [facility] NIGHT MEDICINE COVERAGE    Notified by RN that pt is tachycardic to 113 on tele, pt asymptomatic, offering no complaints to RN.  Rectal temp and EKG ordered.  Pt found to be febrile to 101.5, remainder of vitals are stable.  Pt assessed at bedside, pt reports feeling "tired", reports poor sleep, denies any generalized pain, chest pain, palpitations, difficulty breathing, abdominal pain, or weakness.  Pt s/p RUL wedge resection on 11/10/22.    Vital Signs Last 24 Hrs  T(C): 36.7 (14 Nov 2022 22:26), Max: 38.6 (14 Nov 2022 20:31)  T(F): 98 (14 Nov 2022 22:26), Max: 101.5 (14 Nov 2022 20:31)  HR: 106 (14 Nov 2022 22:26) (90 - 118)  BP: 135/52 (14 Nov 2022 22:26) (103/78 - 145/45)  RR: 19 (14 Nov 2022 22:26) (17 - 19)  SpO2: 91% (14 Nov 2022 22:26) (91% - 99%)    O2 Parameters below as of 14 Nov 2022 22:26  Patient On (Oxygen Delivery Method): room air    Physical Exam:  GS: A&Ox3, in NAD, calm  Lungs: Diminished breath sounds over R apex, otherwise CTA b/l, w/o w/r/r  Heart: Tachycardic, +S1S2, w/o m/r/g  Abdomen: Soft, NT/ND, +BS w/o rebound or guarding  Extremities: Warm, well-perfused, no LE edema noted b/l  Neuro: Non-focal, WEINSTEIN x4    Plan:  -SIRS positive  -EKG shows sinus tachycardia @ 108 bpm, TWI noted in I, aVL, V5, and V6, Trop 10 -> 14, CK-MB negative, pt denies any chest pain, monitor for now, repeat EKG in AM  -Sepsis work-up ordered  -Giving 500cc LR @ 100cchr (RN aware of order), pt has mild to moderate mitral stenosis on TTE, use caution w/ IV fluids  -Trend Lactate, procalcitonin  -Vitals q4hrs  -CXR ordered  -UA ordered  -BCx x2 ordered  -RVP and COVID pending, f/u results  -Case d/w attending, Dr. Arias, can hold off on empiric antibiotics for now, should pt's condition change, then start empiric Zosyn.  -Hemodynamically stable presently    Plan d/w pt and RN, all questions answered.  Pt stable at this time, will continue to monitor.    Jorge Hamilton PA-C  Department of Medicine - [facility]  In-House Pager: #[phone] [facility] NIGHT MEDICINE COVERAGE    Notified by RN that pt is tachycardic to 113 on tele, pt asymptomatic, offering no complaints to RN.  Rectal temp and EKG ordered.  Pt found to be febrile to 101.5, remainder of vitals are stable.  Pt assessed at bedside, pt reports feeling "tired", reports poor sleep, denies any generalized pain, chest pain, palpitations, difficulty breathing, abdominal pain, or weakness.  Pt s/p RUL wedge resection on 11/10/22.    Vital Signs Last 24 Hrs  T(C): 36.7 (14 Nov 2022 22:26), Max: 38.6 (14 Nov 2022 20:31)  T(F): 98 (14 Nov 2022 22:26), Max: 101.5 (14 Nov 2022 20:31)  HR: 106 (14 Nov 2022 22:26) (90 - 118)  BP: 135/52 (14 Nov 2022 22:26) (103/78 - 145/45)  RR: 19 (14 Nov 2022 22:26) (17 - 19)  SpO2: 91% (14 Nov 2022 22:26) (91% - 99%)    O2 Parameters below as of 14 Nov 2022 22:26  Patient On (Oxygen Delivery Method): room air    Physical Exam:  GS: A&Ox3, in NAD, calm  Lungs: Diminished breath sounds over R apex, otherwise CTA b/l, w/o w/r/r  Heart: Tachycardic, +S1S2, w/o m/r/g  Abdomen: Soft, NT/ND, +BS w/o rebound or guarding  Extremities: Warm, well-perfused, no LE edema noted b/l  Neuro: Non-focal, WEINSTEIN x4    Plan:  -SIRS positive  -EKG shows sinus tachycardia @ 108 bpm, TWI noted in I, aVL, V5, and V6, Trop 10 -> 14, CK-MB negative, pt denies any chest pain, monitor for now, repeat EKG in AM  -Sepsis work-up ordered  -Giving 500cc LR @ 100cchr (RN aware of order), pt has mild to moderate mitral stenosis on TTE, use caution w/ IV fluids  -Trend Lactate, procalcitonin  -Vitals q4hrs  -CXR ordered  -UA ordered  -BCx x2 ordered  -RVP and COVID pending, f/u results  -Case d/w attending, Dr. Arias, can hold off on empiric antibiotics for now, should pt's condition change, then start empiric coverage.  -Hemodynamically stable presently    Plan d/w pt and RN, all questions answered.  Pt stable at this time, will continue to monitor.    Jorge Hamilton PA-C  Department of Medicine - [facility]  In-House Pager: [phone] [facility] NIGHT MEDICINE COVERAGE    Notified by RN that pt is tachycardic to 113 on tele, pt asymptomatic, offering no complaints to RN.  Rectal temp and EKG ordered.  Pt found to be febrile to 101.5, remainder of vitals are stable.  Pt assessed at bedside, pt reports feeling "tired", reports poor sleep, denies any generalized pain, chest pain, palpitations, difficulty breathing, abdominal pain, or weakness.  Pt s/p RUL wedge resection on 11/10/22.    Vital Signs Last 24 Hrs  T(C): 36.7 (14 Nov 2022 22:26), Max: 38.6 (14 Nov 2022 20:31)  T(F): 98 (14 Nov 2022 22:26), Max: 101.5 (14 Nov 2022 20:31)  HR: 106 (14 Nov 2022 22:26) (90 - 118)  BP: 135/52 (14 Nov 2022 22:26) (103/78 - 145/45)  RR: 19 (14 Nov 2022 22:26) (17 - 19)  SpO2: 91% (14 Nov 2022 22:26) (91% - 99%)    O2 Parameters below as of 14 Nov 2022 22:26  Patient On (Oxygen Delivery Method): room air    Physical Exam:  GS: A&Ox3, in NAD, calm  Lungs: Diminished breath sounds over R apex, otherwise CTA b/l, w/o w/r/r  Heart: Tachycardic, +S1S2, w/o m/r/g  Abdomen: Soft, NT/ND, +BS w/o rebound or guarding  Extremities: Warm, well-perfused, no LE edema noted b/l  Neuro: Non-focal, WEINSTEIN x4  R Chest wall dressing assessed in presence of primary RN, dressing is clean, dry, and intact, no erythema of skin surrounding dressing noted.  Site is non-tender.    Plan:  -SIRS positive  -EKG shows sinus tachycardia @ 108 bpm, TWI noted in I, aVL, V5, and V6, Trop 10 -> 14, CK-MB negative, pt denies any chest pain, monitor for now, repeat EKG in AM  -Sepsis work-up ordered  -Giving 500cc LR @ 100cchr (RN aware of order), pt has mild to moderate mitral stenosis on TTE, use caution w/ IV fluids  -Trend Lactate, procalcitonin  -Vitals q4hrs  -CXR ordered  -UA ordered  -BCx x2 ordered  -RVP and COVID pending, f/u results  -Case d/w attending, Dr. Arias, can hold off on empiric antibiotics for now, should pt's condition change, then start empiric coverage.  -Hemodynamically stable presently    Plan d/w pt and RN, all questions answered.  Pt stable at this time, will continue to monitor.    Jorge Hamilton PA-C  Department of Medicine - [facility]  In-House Pager: #[phone]

## 2022-11-14 NOTE — PROGRESS NOTE ADULT - NSPROGADDITIONALINFOA_GEN_ALL_CORE
d/w pt and NP.    Pt of PCP Dr. Crystal Santiago at [facility].     - Dr. MARY Arias ([facility])  - [phone]

## 2022-11-14 NOTE — DIETITIAN INITIAL EVALUATION ADULT - PERTINENT MEDS FT
MEDICATIONS  (STANDING):  acetaminophen   IVPB .. 750 milliGRAM(s) IV Intermittent once  citalopram 10 milliGRAM(s) Oral daily  fluticasone propionate 50 MICROgram(s)/spray Nasal Spray 1 Spray(s) Both Nostrils two times a day  folic acid 1 milliGRAM(s) Oral daily  heparin   Injectable 5000 Unit(s) SubCutaneous every 12 hours  lidocaine   4% Patch 1 Patch Transdermal daily  pantoprazole    Tablet 40 milliGRAM(s) Oral before breakfast  polyethylene glycol 3350 17 Gram(s) Oral daily  senna 1 Tablet(s) Oral daily  sodium chloride 1 Gram(s) Oral daily    MEDICATIONS  (PRN):  HYDROmorphone   Tablet 1 milliGRAM(s) Oral every 3 hours PRN Moderate-Severe Pain (4 - 10)  HYDROmorphone  Injectable 0.2 milliGRAM(s) IV Push every 3 hours PRN Severe Breakthrough Pain (7 - 10)  naloxone Injectable 0.1 milliGRAM(s) IV Push every 3 minutes PRN For ANY of the following changes in patient status:  A. RR LESS THAN 10 breaths per minute, B. Oxygen saturation LESS THAN 90%, C. Sedation score of 6  ondansetron Injectable 4 milliGRAM(s) IV Push every 6 hours PRN Nausea

## 2022-11-14 NOTE — PROGRESS NOTE ADULT - SUBJECTIVE AND OBJECTIVE BOX
[facility] [place] [facility]   MD RONEL FRIAS MD KRISTINE SOLTANPOUR, DO ANGELA WONG, PA    TEL:  OFFICE: [phone]  From 5pm-7am Answering Service [phone]    -- RENAL FOLLOW UP NOTE ---Date of Service 11-14-22 @ 13:38    Patient is a 81y old  Female who presents with a chief complaint of RVATS, RUL Nodule Biopsy w/ IR marking (13 Nov 2022 19:13)      Patient seen and examined at bedside. No chest pain/sob    VITALS:  T(F): 98 (11-14-22 @ 10:17), Max: 98.5 (11-14-22 @ 05:30)  HR: 102 (11-14-22 @ 10:17)  BP: 103/78 (11-14-22 @ 10:17)  RR: 18 (11-14-22 @ 10:17)  SpO2: 95% (11-14-22 @ 10:17)  Wt(kg): --    11-13 @ 07:01  -  11-14 @ 07:00  --------------------------------------------------------  IN: 400 mL / OUT: 900 mL / NET: -500 mL          PHYSICAL EXAM:  General: NAD  Neck: No JVD  Respiratory: CTAB, no wheezes, rales or rhonchi  Cardiovascular: S1, S2, RRR  Gastrointestinal: BS+, soft, NT/ND  Extremities: No peripheral edema    Hospital Medications:   MEDICATIONS  (STANDING):  acetaminophen   IVPB .. 750 milliGRAM(s) IV Intermittent once  citalopram 10 milliGRAM(s) Oral daily  fluticasone propionate 50 MICROgram(s)/spray Nasal Spray 1 Spray(s) Both Nostrils two times a day  folic acid 1 milliGRAM(s) Oral daily  heparin   Injectable 5000 Unit(s) SubCutaneous every 12 hours  lidocaine   4% Patch 1 Patch Transdermal daily  pantoprazole    Tablet 40 milliGRAM(s) Oral before breakfast  polyethylene glycol 3350 17 Gram(s) Oral daily  senna 1 Tablet(s) Oral daily  sodium chloride 1 Gram(s) Oral daily      LABS:  11-14    137  |  106  |  14  ----------------------------<  93  4.0   |  21<L>  |  0.57    Ca    7.9<L>      14 Nov 2022 06:04  Phos  1.7     11-14  Mg     1.80     11-14    TPro  5.7<L>  /  Alb  2.3<L>  /  TBili  0.5  /  DBili      /  AST  19  /  ALT  6   /  AlkPhos  74  11-14    Creatinine Trend: 0.57 <--, 0.59 <--, 0.77 <--, 0.75 <--, 0.59 <--, 0.78 <--    Albumin, Serum: 2.3 g/dL (11-14 @ 06:04)  Phosphorus Level, Serum: 1.7 mg/dL (11-14 @ 06:04)                              8.8    6.99  )-----------( 309      ( 14 Nov 2022 06:04 )             29.4     Urine Studies:      Iron 97, TIBC <127, %sat --      [11-10-22 @ 06:55]  Ferritin 986      [11-10-22 @ 06:55]  Lipid: chol 84, TG 81, HDL 22, LDL --      [10-01-22 @ 07:10]    HBsAb Nonreact      [11-11-22 @ 04:15]  HBsAg Nonreact      [11-11-22 @ 04:15]  HBcAb Nonreact      [11-11-22 @ 04:15]    ANCA: cANCA Negative, pANCA Negative, atypical ANCA Indeterminate Method interference due to CATHERINE Fluorescence      [11-10-22 @ 06:55]  MPO-ANCA <5.0, interpretation: Negative      [11-10-22 @ 06:55]  PR3-ANCA <5.0, interpretation: Negative      [11-10-22 @ 06:55]    RADIOLOGY & ADDITIONAL STUDIES:

## 2022-11-14 NOTE — PROGRESS NOTE ADULT - ASSESSMENT
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 Patient states that recently she has been feeling very fatigued. She states that moving around her house, or even to the bathroom, has been causing her to get very tired and causes her some minor chest pain, which resolves quickly with rest. She states that her breathing has been non-labored, denies dyspnea, shortness of breath, inability to catch her breath. She states that she has also not had much of an appetite over the last month and has been eating mostly soft foods. She admits to about a 5lb weight loss over the last month. She admits to a minor cough that occasionally occurs in fits and makes her feel as if she may vomit. Patient admits to some nausea and aversion to food but denies mechanical issues, inability to eat or feeling of choking. Denies hematemesis, hemoptysis.   (09 Nov 2022 12:31)      Hyponatremia  Likely SIADH resolved  on na tab 1g daily  monitor    Hypophosphatemia  Will need to be supplemented  Check phosphorus daily.     Anemia  Defer to hematology    ANCA-associated vasculitis  Defer             hypocalcemia  sec to low alb  check vit d 1,25

## 2022-11-14 NOTE — CHART NOTE - NSCHARTNOTEFT_GEN_A_CORE
Pt s/p wedge lung x w mediastinal lymph note. Serologies so far negative. Awaiting final report. Will f/u mid week after final path report.     Stanislav Saenz MD   [phone] Pt s/p wedge lung x w mediastinal lymph note. Serologies so far negative. Awaiting final report. Will f/u mid week after final path report.     Please send Quantiferon Gold, Hep C ab. Please send urine protein/cr.    Stanislva Saenz MD   [phone] Pt s/p wedge lung x w mediastinal lymph note. Serologies so far negative. Awaiting final report. Will f/u mid week after final path report.     Please send Quantiferon Gold, Hep C ab, full T subsets. Please send urine protein/cr.    Stanislav Saenz MD   [phone]

## 2022-11-15 NOTE — PROGRESS NOTE ADULT - SUBJECTIVE AND OBJECTIVE BOX
SUBJECTIVE/ OVERNIGHT EVENTS:  monitoring fever curve  sinus tachy  Cta chest and LE US dopplers ordered  mental status baseline  low dose metorpolol started  card Dr. Hooker consulted for tomorrow.  labs stable  no cp, no sob, no n/v/d. no abdominal pain.  no headache, no dizziness.         --------------------------------------------------------------------------------------------  LABS:                        9.7    7.71  )-----------( 268      ( 15 Nov 2022 06:47 )             32.6     11-15    135  |  102  |  9   ----------------------------<  115<H>  4.0   |  24  |  0.63    Ca    7.8<L>      15 Nov 2022 06:47  Phos  2.5     11-15  Mg     1.70     11-15    TPro  5.8<L>  /  Alb  2.5<L>  /  TBili  0.5  /  DBili  x   /  AST  18  /  ALT  10  /  AlkPhos  80  11-14    PT/INR - ( 2022 22:00 )   PT: 14.8 sec;   INR: 1.27 ratio         PTT - ( 2022 22:00 )  PTT:38.7 sec  CAPILLARY BLOOD GLUCOSE        CARDIAC MARKERS ( 2022 22:00 )  x     / x     / 16 U/L / x     / <1.0 ng/mL      Urinalysis Basic - ( 15 Nov 2022 05:00 )    Color: Yellow / Appearance: Clear / S.019 / pH: x  Gluc: x / Ketone: Negative  / Bili: Negative / Urobili: 12 mg/dL   Blood: x / Protein: Trace / Nitrite: Negative   Leuk Esterase: Negative / RBC: x / WBC x   Sq Epi: x / Non Sq Epi: x / Bacteria: x        RADIOLOGY & ADDITIONAL TESTS:    Imaging Personally Reviewed:  [x] YES  [ ] NO    Consultant(s) Notes Reviewed:  [x] YES  [ ] NO    MEDICATIONS  (STANDING):  acetaminophen   IVPB .. 750 milliGRAM(s) IV Intermittent once  citalopram 10 milliGRAM(s) Oral daily  fluticasone propionate 50 MICROgram(s)/spray Nasal Spray 1 Spray(s) Both Nostrils two times a day  folic acid 1 milliGRAM(s) Oral daily  heparin   Injectable 5000 Unit(s) SubCutaneous every 12 hours  lidocaine   4% Patch 1 Patch Transdermal daily  melatonin 3 milliGRAM(s) Oral at bedtime  metoprolol tartrate 12.5 milliGRAM(s) Oral two times a day  pantoprazole    Tablet 40 milliGRAM(s) Oral before breakfast  polyethylene glycol 3350 17 Gram(s) Oral daily  senna 1 Tablet(s) Oral daily  sodium chloride 1 Gram(s) Oral daily    MEDICATIONS  (PRN):  HYDROmorphone   Tablet 1 milliGRAM(s) Oral every 3 hours PRN Moderate-Severe Pain (4 - 10)  HYDROmorphone  Injectable 0.2 milliGRAM(s) IV Push every 3 hours PRN Severe Breakthrough Pain (7 - 10)  naloxone Injectable 0.1 milliGRAM(s) IV Push every 3 minutes PRN For ANY of the following changes in patient status:  A. RR LESS THAN 10 breaths per minute, B. Oxygen saturation LESS THAN 90%, C. Sedation score of 6  ondansetron Injectable 4 milliGRAM(s) IV Push every 6 hours PRN Nausea      Care Discussed with Consultants/Other Providers [x] YES  [ ] NO    Vital Signs Last 24 Hrs  T(C): 36.4 (15 Nov 2022 18:45), Max: 36.7 (2022 22:26)  T(F): 97.6 (15 Nov 2022 18:45), Max: 98.1 (15 Nov 2022 16:14)  HR: 121 (15 Nov 2022 18:45) (93 - 121)  BP: 131/51 (15 Nov 2022 18:45) (118/45 - 170/70)  BP(mean): --  RR: 18 (15 Nov 2022 18:45) (16 - 19)  SpO2: 96% (15 Nov 2022 18:45) (91% - 100%)    Parameters below as of 15 Nov 2022 18:45  Patient On (Oxygen Delivery Method): room air      I&O's Summary    2022 07:01  -  15 2022 07:00  --------------------------------------------------------  IN: 580 mL / OUT: 1100 mL / NET: -520 mL    15 Nov 2022 07:01  -  15 2022 20:34  --------------------------------------------------------  IN: 240 mL / OUT: 500 mL / NET: -260 mL      PHYSICAL EXAM:  GENERAL: NAD, thin-elderly, comfortable on room air  HEAD:  Atraumatic, Normocephalic  EYES: EOMI, PERRLA, conjunctiva and sclera clear  NECK: Supple, No JVD  CHEST/LUNG: mild decrease breath sounds bilaterally; No wheeze   HEART: Regular rate and rhythm; No murmurs, rubs, or gallops  ABDOMEN: Soft, Nontender, Nondistended; Bowel sounds present  Neuro: AAOx3, no focal weakness   EXTREMITIES:  2+ Peripheral Pulses, No clubbing, cyanosis, or edema  SKIN: No rashes or lesions

## 2022-11-15 NOTE — PROVIDER CONTACT NOTE (CHANGE IN STATUS NOTIFICATION) - ASSESSMENT
Pt A&O x4. Pt vitals stable, except heart rate up to 140s. Pt denies chest pain and shortness of breath. Pt denies nausea. Pt denies pain.

## 2022-11-15 NOTE — PROGRESS NOTE ADULT - SUBJECTIVE AND OBJECTIVE BOX
[facility] [place] [facility]   MD RONEL FRIAS MD KRISTINE SOLTANPOUR, DO ANGELA WONG, PA    TEL:  OFFICE: [phone]  From 5pm-7am Answering Service [phone]    -- RENAL FOLLOW UP NOTE ---Date of Service 11-15-22 @ 11:23    Patient is a 81y old  Female who presents with a chief complaint of RVATS, RUL Nodule Biopsy w/ IR marking (14 Nov 2022 23:50)      Patient seen and examined at bedside. No chest pain/sob    VITALS:  T(F): 97.8 (11-15-22 @ 09:25), Max: 101.5 (11-14-22 @ 20:31)  HR: 108 (11-15-22 @ 09:25)  BP: 146/61 (11-15-22 @ 09:25)  RR: 16 (11-15-22 @ 09:25)  SpO2: 97% (11-15-22 @ 09:25)  Wt(kg): --    11-14 @ 07:01  -  11-15 @ 07:00  --------------------------------------------------------  IN: 580 mL / OUT: 1100 mL / NET: -520 mL    11-15 @ 07:01  -  11-15 @ 11:23  --------------------------------------------------------  IN: 120 mL / OUT: 0 mL / NET: 120 mL          PHYSICAL EXAM:  General: NAD  Neck: No JVD  Respiratory: CTAB, no wheezes, rales or rhonchi  Cardiovascular: S1, S2, RRR  Gastrointestinal: BS+, soft, NT/ND  Extremities: No peripheral edema    Hospital Medications:   MEDICATIONS  (STANDING):  acetaminophen   IVPB .. 750 milliGRAM(s) IV Intermittent once  citalopram 10 milliGRAM(s) Oral daily  fluticasone propionate 50 MICROgram(s)/spray Nasal Spray 1 Spray(s) Both Nostrils two times a day  folic acid 1 milliGRAM(s) Oral daily  heparin   Injectable 5000 Unit(s) SubCutaneous every 12 hours  lidocaine   4% Patch 1 Patch Transdermal daily  melatonin 3 milliGRAM(s) Oral at bedtime  pantoprazole    Tablet 40 milliGRAM(s) Oral before breakfast  polyethylene glycol 3350 17 Gram(s) Oral daily  senna 1 Tablet(s) Oral daily  sodium chloride 1 Gram(s) Oral daily      LABS:  11-15    135  |  102  |  9   ----------------------------<  115<H>  4.0   |  24  |  0.63    Ca    7.8<L>      15 Nov 2022 06:47  Phos  2.5     11-15  Mg     1.70     11-15    TPro  5.8<L>  /  Alb  2.5<L>  /  TBili  0.5  /  DBili      /  AST  18  /  ALT  10  /  AlkPhos  80  11-14    Creatinine Trend: 0.63 <--, 0.57 <--, 0.57 <--, 0.59 <--, 0.77 <--, 0.75 <--, 0.59 <--, 0.78 <--    Vitamin D, 25-Hydroxy: 38.3 ng/mL (11-15 @ 06:47)  Phosphorus Level, Serum: 2.5 mg/dL (11-15 @ 06:47)  Albumin, Serum: 2.5 g/dL (11-14 @ 22:00)  Phosphorus Level, Serum: 1.5 mg/dL (11-14 @ 22:00)                              9.7    7.71  )-----------( 268      ( 15 Nov 2022 06:47 )             32.6     Urine Studies:  Urinalysis - [11-15-22 @ 05:00]      Color Yellow / Appearance Clear / SG 1.019 / pH 7.0      Gluc Negative / Ketone Negative  / Bili Negative / Urobili 12 mg/dL       Blood Negative / Protein Trace / Leuk Est Negative / Nitrite Negative      RBC  / WBC  / Hyaline  / Gran  / Sq Epi  / Non Sq Epi  / Bacteria     Urine Creatinine 58      [11-15-22 @ 05:00]  Urine Protein 36      [11-15-22 @ 05:00]    Iron 97, TIBC <127, %sat --      [11-10-22 @ 06:55]  Ferritin 986      [11-10-22 @ 06:55]  Vitamin D (25OH) 38.3      [11-15-22 @ 06:47]  Lipid: chol 84, TG 81, HDL 22, LDL --      [10-01-22 @ 07:10]    HBsAb Nonreact      [11-11-22 @ 04:15]  HBsAg Nonreact      [11-11-22 @ 04:15]  HBcAb Nonreact      [11-11-22 @ 04:15]    ANCA: cANCA Negative, pANCA Negative, atypical ANCA Indeterminate Method interference due to CATHERINE Fluorescence      [11-10-22 @ 06:55]  MPO-ANCA <5.0, interpretation: Negative      [11-10-22 @ 06:55]  PR3-ANCA <5.0, interpretation: Negative      [11-10-22 @ 06:55]    RADIOLOGY & ADDITIONAL STUDIES:

## 2022-11-15 NOTE — PROGRESS NOTE ADULT - ASSESSMENT
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 Patient states that recently she has been feeling very fatigued.    Fatigue/dyspnea, with hx of Lymphoma: will need to r/o recurrent  - heme/onc following   - Recent TTE on 11/3/22 reviewed: normal LV. outpt card Dr. Ady Cooper  - PT also saw pulm Mack Tucker in the office, with some concern for her overall status. She was hypotensive to systolic 90s in the office.  (now BP improves s/p IVF here).   - Rheum has also been consulted to r/o vasculitis per heme/onc recommendation. labs sent.   - clinically she feels okay without chest pain.   - s/p thoracoscopic biopsy of mediastinal lymph node and Lung wedge resection 11/10/22  - f/u path results (pending)   - monitored in CT-ICU post op, remain stable. Transferred to the floor.  - PT: rehab. Pt now agreeable to rehab, awaiting placement    Fever 101F with sinus tachycardia: r/o sepsis vs. tumor fever   - IV fluid started. sepsis work up initiated.   - UA, CXR unimpressive. RVP neg.   - no leukocytosis, remain stable clinically  - repeat labs reviewed. Procal low.  - monitoring off abx with low threshold to start abx if any decompensation.  - ID consulted.  - CTa chest and LE Dopplers ordered for sinus tachy  - low dose metoprolol started after IVF.  - card consulted (Dr. Hooker) for tomorrow    Anemia  - hgb 8.1 lower than her baseline.  - no melena, no hematochezia. no BRBPR.   - recent Anemia work-up reviewed: normal vit b12, folate.  - repeat iron studies ordered    - s/p 2 units PRBC 11/1/22 per heme's note.   - given 1 unit prbc 11/9/22 with appropriate post transfusion hgb.    - no melena, no hematochezia. no BRBPR.   - will continue to monitor, repeat hgb stable     Leukocytosis  - stable CXR, UA. (no urinary symptoms)  - stable post transfusion hgb.     hx on ANCA+ vasculitis  - no current flair   - rheum eval per heme/onc request given pulm findings on CT chest.     Anxiety and depression  - stable, continue citalopram.  - Pt denied SI/HI ideations, denied visual and auditory hallucinations.     GERD (gastroesophageal reflux disease)  - continue PPI    Hyperlipidemia.   - continue home ezetimibe. okay to hold if nonformulary   - Lipid panel    DVT ppx

## 2022-11-15 NOTE — PROGRESS NOTE ADULT - NSPROGADDITIONALINFOA_GEN_ALL_CORE
d/w pt and NP.  Roge Cooper will be covering for the pt starting 11/16/22. He can be reached at  if needed.     Pt of PCP Dr. Crystal Santiago at [facility].     - Dr. MARY Arias ([facility])  - [phone]

## 2022-11-15 NOTE — CONSULT NOTE ADULT - ASSESSMENT
80 y/o F PMhx diffuse large B cell lymphoma s/p R-CHOP, depression, GERD, PE/DVT (4/2021 no longer on Eliquis), ANCA-vasculitis (20 y/a, not on therapy), s/p LVATS, LUIS wedge resection (2021) who presented as for RVATS, RUL Nodule Biopsy    fever  febrile to 101.5 on 11/14  no evidence of SSTI on exam, no erythema or tenderness surrounding mediport  no localizing signs of symptoms  RVP negative and SARS-CoV-2 PCR neg  quant gold negative  f/u blood cultures  f/u LE US  monitor off antibiotics at this time    DLBCL  concern for recurrence  s/p R VATS with RUL wedge resection and mediastinal LN dissection on 11/10/22  f/u pathology    Gio Tate M.D.  [facility], Division of Infectious Diseases  [phone]  After 5pm on weekdays and all day on weekends - please call [phone]  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), s/p LVATS, LUIS wedge resection (2021) who presented as for RVATS, RUL Nodule Biopsy    fever  febrile to 101.5 on 11/14  no evidence of SSTI on exam, no erythema or tenderness surrounding mediport  no localizing signs of symptoms  RVP negative and SARS-CoV-2 PCR neg  quant gold negative  f/u blood cultures  f/u LE US  f/u CT chest  monitor off antibiotics at this time    DLBCL  concern for recurrence  s/p R VATS with RUL wedge resection and mediastinal LN dissection on 11/10/22  f/u pathology    Gio Tate M.D.  [facility], Division of Infectious Diseases  [phone]  After 5pm on weekdays and all day on weekends - please call [phone]

## 2022-11-15 NOTE — PROGRESS NOTE ADULT - ASSESSMENT
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 Patient states that recently she has been feeling very fatigued. She states that moving around her house, or even to the bathroom, has been causing her to get very tired and causes her some minor chest pain, which resolves quickly with rest. She states that her breathing has been non-labored, denies dyspnea, shortness of breath, inability to catch her breath. She states that she has also not had much of an appetite over the last month and has been eating mostly soft foods. She admits to about a 5lb weight loss over the last month. She admits to a minor cough that occasionally occurs in fits and makes her feel as if she may vomit. Patient admits to some nausea and aversion to food but denies mechanical issues, inability to eat or feeling of choking. Denies hematemesis, hemoptysis.   (09 Nov 2022 12:31)      Hyponatremia  Likely SIADH resolved  on na tab 1g daily  monitor    Hypophosphatemia  supplemented  Check phosphorus daily.     Anemia  Defer to hematology    ANCA-associated vasculitis  Defer             hypocalcemia  sec to low alb  check vit d 1,25

## 2022-11-15 NOTE — CONSULT NOTE ADULT - SUBJECTIVE AND OBJECTIVE BOX
[facility], Division of Infectious Diseases  ANDREW Rodríguez S. Shah, Y. Patel, G. Bebeto  [phone]  ([phone] - weekdays after 5pm and weekends)    BETTIE PRATER  81y, Female  [id number]    HPI--  HPI:  80 y/o F PMhx diffuse large B cell lymphoma s/p R-CHOP, depression, GERD, PE/DVT (2021 no longer on Eliquis), ANCA-vasculitis (22 y/a, not on therapy), s/p LVATS, LUIS wedge resection () who presented as direct admit for RVATS, RUL Nodule Biopsy. She underwent R VATS with RUL wedge resection and mediastinal LN dissection on 11/10/22. Hospital course c/b fever on . She admits to a minor cough. Denies fever, chest pain, SOB, abd pain, n/v, diarrhea, dysuria, myalgias, arthralgias.    ROS: 10 point review of systems completed, pertinent positives and negatives as per HPI.    Allergies: codeine (Nausea)  doxycycline (Nausea)  penicillin (Hives)    PMH -- Vasculitis    ANCA-associated vasculitis    Anxiety and depression    Pulmonary embolism    DVT, lower extremity    GERD (gastroesophageal reflux disease)    Hyperlipidemia    Lung mass    DLBCL (diffuse large B cell lymphoma)      PSH -- No significant past surgical history    History of appendectomy    Lung nodule    Non-Hodgkins lymphoma      FH -- No pertinent family history in first degree relatives    FH: lung cancer (Sibling)    FH: CAD (coronary artery disease) (Sibling)      Social History -- denies tobacco, alcohol or illicit drug use    Physical Exam--  Vital Signs Last 24 Hrs  T(F): 97.8 (15 Nov 2022 09:25), Max: 101.5 (2022 20:31)  HR: 108 (15 Nov 2022 09:25) (93 - 118)  BP: 146/61 (15 Nov 2022 09:25) (135/52 - 170/70)  RR: 16 (15 Nov 2022 09:25) (16 - 19)  SpO2: 97% (15 Nov 2022 09:25) (91% - 100%)  General: nontoxic-appearing, no acute distress  HEENT: NC/AT, anicteric, oropharynx clear, several dental fillings  Neck: Not rigid. No LAD  Lungs: L lung clear, R lung base w/ decreased breath sounds  Heart: S1, S2, normal rate, L chest mediport w/o surrounding erythema or tenderness  Abdomen: Soft. Nondistended. Nontender.   Neuro: no obvious focal deficits   Back: No costovertebral angle tenderness.  Extremities: No LE edema.   Skin: Warm. Dry. No rash. dressing to R flank  Psychiatric: Appropriate affect and mood for situation.     Laboratory & Imaging Data--  CBC:                       9.7    7.71  )-----------( 268      ( 15 Nov 2022 06:47 )             32.6     WBC Count: 7.71 K/uL (11-15-22 @ 06:47)  WBC Count: 8.07 K/uL (22 @ 22:00)  WBC Count: 6.99 K/uL (22 @ 06:04)  WBC Count: 7.06 K/uL (22 @ 06:10)  WBC Count: 11.70 K/uL (22 @ 05:59)  WBC Count: 8.36 K/uL (22 @ 04:15)  WBC Count: 12.19 K/uL (11-10-22 @ 06:55)  WBC Count: 18.17 K/uL (22 @ 12:45)    CMP: 11-15    135  |  102  |  9   ----------------------------<  115<H>  4.0   |  24  |  0.63    Ca    7.8<L>      15 Nov 2022 06:47  Phos  2.5     11-15  Mg     1.70     11-15    TPro  5.8<L>  /  Alb  2.5<L>  /  TBili  0.5  /  DBili  x   /  AST  18  /  ALT  10  /  AlkPhos  80      LIVER FUNCTIONS - ( 2022 22:00 )  Alb: 2.5 g/dL / Pro: 5.8 g/dL / ALK PHOS: 80 U/L / ALT: 10 U/L / AST: 18 U/L / GGT: x           Urinalysis Basic - ( 15 Nov 2022 05:00 )    Color: Yellow / Appearance: Clear / S.019 / pH: x  Gluc: x / Ketone: Negative  / Bili: Negative / Urobili: 12 mg/dL   Blood: x / Protein: Trace / Nitrite: Negative   Leuk Esterase: Negative / RBC: x / WBC x   Sq Epi: x / Non Sq Epi: x / Bacteria: x      Microbiology:       Radiology--  ***  Active Medications--  acetaminophen   IVPB .. 750 milliGRAM(s) IV Intermittent once  citalopram 10 milliGRAM(s) Oral daily  fluticasone propionate 50 MICROgram(s)/spray Nasal Spray 1 Spray(s) Both Nostrils two times a day  folic acid 1 milliGRAM(s) Oral daily  heparin   Injectable 5000 Unit(s) SubCutaneous every 12 hours  HYDROmorphone   Tablet 1 milliGRAM(s) Oral every 3 hours PRN  HYDROmorphone  Injectable 0.2 milliGRAM(s) IV Push every 3 hours PRN  lidocaine   4% Patch 1 Patch Transdermal daily  melatonin 3 milliGRAM(s) Oral at bedtime  naloxone Injectable 0.1 milliGRAM(s) IV Push every 3 minutes PRN  ondansetron Injectable 4 milliGRAM(s) IV Push every 6 hours PRN  pantoprazole    Tablet 40 milliGRAM(s) Oral before breakfast  polyethylene glycol 3350 17 Gram(s) Oral daily  senna 1 Tablet(s) Oral daily  sodium chloride 1 Gram(s) Oral daily    Antimicrobials:     Immunologic:

## 2022-11-16 NOTE — PROGRESS NOTE ADULT - ASSESSMENT
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 Patient states that recently she has been feeling very fatigued.    Fatigue/dyspnea, with hx of Lymphoma: will need to r/o recurrent  - heme/onc following   - Recent TTE on 11/3/22 reviewed: normal LV. outpt card Dr. Ady Cooper  - PT also saw pulm Mack Tucker in the office, with some concern for her overall status. She was hypotensive to systolic 90s in the office.  (now BP improves s/p IVF here).   - Rheum has also been consulted to r/o vasculitis per heme/onc recommendation. labs sent.   - clinically she feels okay without chest pain.   - s/p thoracoscopic biopsy of mediastinal lymph node and Lung wedge resection 11/10/22  - f/u path results (pending)   - monitored in CT-ICU post op, remain stable. Transferred to the floor.  - PT: rehab. Pt now agreeable to rehab    Fever 101F with sinus tachycardia: r/o sepsis vs. tumor fever   - IV fluid started. sepsis work up initiated.   - UA, CXR unimpressive. RVP neg.   - no leukocytosis, remain stable clinically  - repeat labs reviewed. Procal low.  - monitoring off abx with low threshold to start abx if any decompensation.  - ID consulted.  - CTA chest and LE Dopplers ordered for sinus tachy; LE venous dopplers (-) for DVT  - low dose metoprolol started after IVF.  - card consulted (Dr. Hooker) appreciated    Anemia  - hgb 8.1 lower than her baseline.  - no melena, no hematochezia. no BRBPR.   - recent Anemia work-up reviewed: normal vit b12, folate.  - repeat iron studies ordered    - s/p 2 units PRBC 11/1/22 per heme's note.   - given 1 unit prbc 11/9/22 with appropriate post transfusion hgb.    - no melena, no hematochezia. no BRBPR.   - will continue to monitor, repeat hgb stable     Leukocytosis  - stable CXR, UA. (no urinary symptoms)  - stable post transfusion hgb.     hx on ANCA+ vasculitis  - no current flair   - rheum eval per heme/onc request given pulm findings on CT chest.     Anxiety and depression  - stable, continue citalopram.  - Pt denied SI/HI ideations, denied visual and auditory hallucinations.     GERD (gastroesophageal reflux disease)  - continue PPI    Hyperlipidemia.   - continue home ezetimibe. okay to hold if nonformulary   - Lipid panel    DVT ppx

## 2022-11-16 NOTE — PROGRESS NOTE ADULT - SUBJECTIVE AND OBJECTIVE BOX
[facility], Division of Infectious Diseases  ANDREW Rodríguez Y. Patel, S. Shah, G. Bebeto  [phone]  ([phone] - weekdays after 5pm and weekends)    Name: BETTIE PRATER  Age/Gender: 81y Female  MRN: [id number]    Interval History:  Patient seen this morning.   Notes reviewed.   No concerning overnight events.  Afebrile.   denies abd pain, dysuria, diarrhea  reports mild R sided, pleurisy    Allergies: codeine (Nausea)  doxycycline (Nausea)  penicillin (Hives)      Objective:  Vitals:   T(F): 97.8 (22 @ 09:09), Max: 98.7 (22 @ 05:45)  HR: 102 (22 @ 09:09) (101 - 121)  BP: 143/60 (22 @ 09:09) (118/45 - 160/62)  RR: 18 (22 @ 09:09) (17 - 19)  SpO2: 100% (22 @ 09:09) (96% - 100%)  Physical Examination:  General: no acute distress  HEENT: anicteric  Cardio: S1, S2, tachycardic, L chest mediport, no erythema or tenderness  Resp: L lung clear, R lung base w/ decreased breath sounds  Abd: soft, NT, ND  Ext: no LE edema  Skin: warm, dry    Laboratory Studies:  CBC:                       8.1    7.52  )-----------( 203      ( 2022 06:39 )             26.1     WBC Trend:  7.52 22 @ 06:39  7.71 11-15-22 @ 06:47  8.07 22 @ 22:00  6.99 22 @ 06:04  7.06 22 @ 06:10  11.70 22 @ 05:59  8.36 22 @ 04:15  12.19 11-10-22 @ 06:55  18.17 22 @ 12:45    CMP: -    133<L>  |  102  |  10  ----------------------------<  114<H>  3.9   |  23  |  0.55    Ca    7.6<L>      2022 06:39  Phos  2.6       Mg     1.60         TPro  5.8<L>  /  Alb  2.5<L>  /  TBili  0.5  /  DBili  x   /  AST  18  /  ALT  10  /  AlkPhos  80        LIVER FUNCTIONS - ( 2022 22:00 )  Alb: 2.5 g/dL / Pro: 5.8 g/dL / ALK PHOS: 80 U/L / ALT: 10 U/L / AST: 18 U/L / GGT: x             Urinalysis Basic - ( 15 Nov 2022 05:00 )    Color: Yellow / Appearance: Clear / S.019 / pH: x  Gluc: x / Ketone: Negative  / Bili: Negative / Urobili: 12 mg/dL   Blood: x / Protein: Trace / Nitrite: Negative   Leuk Esterase: Negative / RBC: x / WBC x   Sq Epi: x / Non Sq Epi: x / Bacteria: x      Microbiology: reviewed     Culture - Blood (collected 22 @ 23:17)  Source: .Blood Blood-Peripheral  Preliminary Report (22 @ 07:01):    No growth to date.    Culture - Blood (collected 22 @ 21:40)  Source: .Blood Blood-Peripheral  Preliminary Report (22 @ 04:01):    No growth to date.        Radiology: reviewed     Medications:  acetaminophen   IVPB .. 750 milliGRAM(s) IV Intermittent once  citalopram 10 milliGRAM(s) Oral daily  fluticasone propionate 50 MICROgram(s)/spray Nasal Spray 1 Spray(s) Both Nostrils two times a day  folic acid 1 milliGRAM(s) Oral daily  heparin   Injectable 5000 Unit(s) SubCutaneous every 12 hours  HYDROmorphone   Tablet 1 milliGRAM(s) Oral every 3 hours PRN  HYDROmorphone  Injectable 0.2 milliGRAM(s) IV Push every 3 hours PRN  lidocaine   4% Patch 1 Patch Transdermal daily  melatonin 3 milliGRAM(s) Oral at bedtime  metoprolol tartrate 12.5 milliGRAM(s) Oral two times a day  naloxone Injectable 0.1 milliGRAM(s) IV Push every 3 minutes PRN  ondansetron Injectable 4 milliGRAM(s) IV Push every 6 hours PRN  pantoprazole    Tablet 40 milliGRAM(s) Oral before breakfast  polyethylene glycol 3350 17 Gram(s) Oral daily  senna 1 Tablet(s) Oral daily  sodium chloride 1 Gram(s) Oral daily    Antimicrobials:

## 2022-11-16 NOTE — CONSULT NOTE ADULT - ASSESSMENT
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

## 2022-11-16 NOTE — PROGRESS NOTE ADULT - SUBJECTIVE AND OBJECTIVE BOX
Hematology Follow-up    INTERVAL HPI/OVERNIGHT EVENTS:  Patient S&E at bedside. No o/n events, patient resting comfortably. No complaints at this time. Patient denies fever, chills, dizziness, weakness, CP, palpitations, SOB, cough, N/V/D/C, dysuria, changes in bowel movements, LE edema.    VITAL SIGNS:  T(F): 97.8 (22 @ 09:09)  HR: 102 (22 @ 09:09)  BP: 143/60 (22 @ 09:09)  RR: 18 (22 @ 09:09)  SpO2: 100% (22 @ 09:09)  Wt(kg): --    PHYSICAL EXAM:    Constitutional: AAOx3, NAD,   Eyes: PERRL, EOMI, sclera non-icteric  Neck: supple, no masses, no JVD  Respiratory: CTA b/l, good air entry b/l, no wheezing, rhonchi, rales, with normal respiratory effort and no intercostal retractions  Cardiovascular: RRR, normal S1S2, no M/R/G  Gastrointestinal: soft, NTND, no masses palpable, BS normal in all four quadrants, no HSM  Extremities:  no c/c/e  Neurological: Grossly intact  Skin: Normal temperature    MEDICATIONS  (STANDING):  acetaminophen   IVPB .. 750 milliGRAM(s) IV Intermittent once  citalopram 10 milliGRAM(s) Oral daily  fluticasone propionate 50 MICROgram(s)/spray Nasal Spray 1 Spray(s) Both Nostrils two times a day  folic acid 1 milliGRAM(s) Oral daily  heparin   Injectable 5000 Unit(s) SubCutaneous every 12 hours  lidocaine   4% Patch 1 Patch Transdermal daily  melatonin 3 milliGRAM(s) Oral at bedtime  metoprolol tartrate 12.5 milliGRAM(s) Oral two times a day  pantoprazole    Tablet 40 milliGRAM(s) Oral before breakfast  polyethylene glycol 3350 17 Gram(s) Oral daily  senna 1 Tablet(s) Oral daily  sodium chloride 1 Gram(s) Oral daily    MEDICATIONS  (PRN):  HYDROmorphone   Tablet 1 milliGRAM(s) Oral every 3 hours PRN Moderate-Severe Pain (4 - 10)  HYDROmorphone  Injectable 0.2 milliGRAM(s) IV Push every 3 hours PRN Severe Breakthrough Pain (7 - 10)  naloxone Injectable 0.1 milliGRAM(s) IV Push every 3 minutes PRN For ANY of the following changes in patient status:  A. RR LESS THAN 10 breaths per minute, B. Oxygen saturation LESS THAN 90%, C. Sedation score of 6  ondansetron Injectable 4 milliGRAM(s) IV Push every 6 hours PRN Nausea      codeine (Nausea)  doxycycline (Nausea)  penicillin (Hives)      LABS:                        8.1    7.52  )-----------( 203      ( 2022 06:39 )             26.1     11-16    133<L>  |  102  |  10  ----------------------------<  114<H>  3.9   |  23  |  0.55    Ca    7.6<L>      2022 06:39  Phos  2.6     11-16  Mg     1.60     11-16    TPro  5.8<L>  /  Alb  2.5<L>  /  TBili  0.5  /  DBili  x   /  AST  18  /  ALT  10  /  AlkPhos  80  11-14    PT/INR - ( 2022 22:00 )   PT: 14.8 sec;   INR: 1.27 ratio         PTT - ( 2022 22:00 )  PTT:38.7 sec   Urinalysis Basic - ( 15 Nov 2022 05:00 )    Color: Yellow / Appearance: Clear / S.019 / pH: x  Gluc: x / Ketone: Negative  / Bili: Negative / Urobili: 12 mg/dL   Blood: x / Protein: Trace / Nitrite: Negative   Leuk Esterase: Negative / RBC: x / WBC x   Sq Epi: x / Non Sq Epi: x / Bacteria: x        RADIOLOGY & ADDITIONAL TESTS:  Studies reviewed.   Hematology Follow-up    INTERVAL HPI/OVERNIGHT EVENTS:  Patient S&E at bedside. No o/n events, patient resting comfortably. Still having a dry cough, but she had this cough even prior to this admission. Occasionally using the incentive spirometer. Poor appetite.    VITAL SIGNS:  T(F): 97.8 (22 @ 09:09)  HR: 102 (22 @ 09:09)  BP: 143/60 (22 @ 09:09)  RR: 18 (22 @ 09:09)  SpO2: 100% (22 @ 09:09)  Wt(kg): --    PHYSICAL EXAM:  Constitutional: AAOx3, NAD. Fatigued  Eyes: PERRL, EOMI, sclera non-icteric  Neck: supple, no masses, no JVD  Respiratory: Decreased breath sounds in right middle and lower lung fields  Cardiovascular: RRR, normal S1S2, no M/R/G  Gastrointestinal: soft, NTND, no masses palpable, BS normal in all four quadrants, no HSM  Extremities:  no c/c/e  Neurological: Grossly intact  Skin: Normal temperature    MEDICATIONS  (STANDING):  acetaminophen   IVPB .. 750 milliGRAM(s) IV Intermittent once  citalopram 10 milliGRAM(s) Oral daily  fluticasone propionate 50 MICROgram(s)/spray Nasal Spray 1 Spray(s) Both Nostrils two times a day  folic acid 1 milliGRAM(s) Oral daily  heparin   Injectable 5000 Unit(s) SubCutaneous every 12 hours  lidocaine   4% Patch 1 Patch Transdermal daily  melatonin 3 milliGRAM(s) Oral at bedtime  metoprolol tartrate 12.5 milliGRAM(s) Oral two times a day  pantoprazole    Tablet 40 milliGRAM(s) Oral before breakfast  polyethylene glycol 3350 17 Gram(s) Oral daily  senna 1 Tablet(s) Oral daily  sodium chloride 1 Gram(s) Oral daily    MEDICATIONS  (PRN):  HYDROmorphone   Tablet 1 milliGRAM(s) Oral every 3 hours PRN Moderate-Severe Pain (4 - 10)  HYDROmorphone  Injectable 0.2 milliGRAM(s) IV Push every 3 hours PRN Severe Breakthrough Pain (7 - 10)  naloxone Injectable 0.1 milliGRAM(s) IV Push every 3 minutes PRN For ANY of the following changes in patient status:  A. RR LESS THAN 10 breaths per minute, B. Oxygen saturation LESS THAN 90%, C. Sedation score of 6  ondansetron Injectable 4 milliGRAM(s) IV Push every 6 hours PRN Nausea      codeine (Nausea)  doxycycline (Nausea)  penicillin (Hives)      LABS:                        8.1    7.52  )-----------( 203      ( 2022 06:39 )             26.1     11-16    133<L>  |  102  |  10  ----------------------------<  114<H>  3.9   |  23  |  0.55    Ca    7.6<L>      2022 06:39  Phos  2.6     11-16  Mg     1.60     11-16    TPro  5.8<L>  /  Alb  2.5<L>  /  TBili  0.5  /  DBili  x   /  AST  18  /  ALT  10  /  AlkPhos  80  11-14    PT/INR - ( 2022 22:00 )   PT: 14.8 sec;   INR: 1.27 ratio         PTT - ( 2022 22:00 )  PTT:38.7 sec   Urinalysis Basic - ( 15 Nov 2022 05:00 )    Color: Yellow / Appearance: Clear / S.019 / pH: x  Gluc: x / Ketone: Negative  / Bili: Negative / Urobili: 12 mg/dL   Blood: x / Protein: Trace / Nitrite: Negative   Leuk Esterase: Negative / RBC: x / WBC x   Sq Epi: x / Non Sq Epi: x / Bacteria: x        RADIOLOGY & ADDITIONAL TESTS:  Studies reviewed.

## 2022-11-16 NOTE — PROGRESS NOTE ADULT - SUBJECTIVE AND OBJECTIVE BOX
SUBJECTIVE/ OVERNIGHT EVENTS:  she is comfortably sitting in chair  no hemoptysis  no chest pain  no shortness of breath    Vital Signs Last 24 Hrs  T(C): 36.9 (2022 12:33), Max: 37.1 (2022 05:45)  T(F): 98.4 (:33), Max: 98.7 (2022 05:45)  HR: 101 (:) (101 - 121)  BP: 116/59 (:) (116/59 - 143/64)  BP(mean): --  RR: 18 (:) (18 - 19)  SpO2: 100% (:) (96% - 100%)    I&O's Summary    11-15-22 @ 07:01  -  22 @ 07:00  --------------------------------------------------------  IN: 240 mL / OUT: 500 mL / NET: -260 mL    22 @ 07:01  -  22 @ 16:32  --------------------------------------------------------  IN: 480 mL / OUT: 200 mL / NET: 280 mL        PHYSICAL EXAM:  GENERAL: NAD, thin-elderly, comfortable on room air  HEAD:  Atraumatic, Normocephalic  EYES: EOMI, PERRLA, conjunctiva and sclera clear  NECK: Supple, No JVD  CHEST/LUNG: mild decrease breath sounds bilaterally; No wheeze   HEART: Regular rate and rhythm; No murmurs, rubs, or gallops  ABDOMEN: Soft, Nontender, Nondistended; Bowel sounds present  Neuro: AAOx3, no focal weakness   EXTREMITIES:  2+ Peripheral Pulses, No clubbing, cyanosis, or edema  SKIN: No rashes or lesions     LABS:                        8.1    7.52  )-----------( 203      ( 2022 06:39 )             26.1     11-16    134<L>  |  101  |  10  ----------------------------<  124<H>  4.6   |  22  |  0.60    Ca    8.2<L>      2022 13:04  Phos  2.3     11-16  Mg     2.30     11-16    TPro  5.8<L>  /  Alb  2.5<L>  /  TBili  0.5  /  DBili  x   /  AST  18  /  ALT  10  /  AlkPhos  80  11-14    PT/INR - ( 2022 22:00 )   PT: 14.8 sec;   INR: 1.27 ratio         PTT - ( 2022 22:00 )  PTT:38.7 sec  CAPILLARY BLOOD GLUCOSE        CARDIAC MARKERS ( 2022 22:00 )  x     / x     / 16 U/L / x     / <1.0 ng/mL      Urinalysis Basic - ( 15 Nov 2022 05:00 )    Color: Yellow / Appearance: Clear / S.019 / pH: x  Gluc: x / Ketone: Negative  / Bili: Negative / Urobili: 12 mg/dL   Blood: x / Protein: Trace / Nitrite: Negative   Leuk Esterase: Negative / RBC: x / WBC x   Sq Epi: x / Non Sq Epi: x / Bacteria: x        RADIOLOGY & ADDITIONAL TESTS:    Imaging Personally Reviewed:  [x] YES  [ ] NO    Will obtain old records:  [ ] YES  [x] NO

## 2022-11-16 NOTE — PROGRESS NOTE ADULT - ASSESSMENT
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 Patient states that recently she has been feeling very fatigued. She states that moving around her house, or even to the bathroom, has been causing her to get very tired and causes her some minor chest pain, which resolves quickly with rest. She states that her breathing has been non-labored, denies dyspnea, shortness of breath, inability to catch her breath. She states that she has also not had much of an appetite over the last month and has been eating mostly soft foods. She admits to about a 5lb weight loss over the last month. She admits to a minor cough that occasionally occurs in fits and makes her feel as if she may vomit. Patient admits to some nausea and aversion to food but denies mechanical issues, inability to eat or feeling of choking. Denies hematemesis, hemoptysis.   (09 Nov 2022 12:31)      Hyponatremia  Likely SIADH   on na tab 1g daily  Na dropped slightly today  free water restriction <1L/day  monitor    Hypophosphatemia  supplemented  Check phosphorus daily.     Anemia  Defer to hematology    ANCA-associated vasculitis  Defer             hypocalcemia  sec to low alb  check vit d 1,25

## 2022-11-16 NOTE — PROGRESS NOTE ADULT - ASSESSMENT
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 Patient has been having worsening sob and fatigue along with recent PET/CT concerning for lymphoma recurrence.     #EBV+ DLBCL  Follows with Dr. Madrigal at [facility]. Dx 2021 s/p 4 cycles R-CHOP c/b fatigue, neutropenia and thrombocytopenia, last few cycles R-miniCHOP (completed 6 cycles total on 9/9/21 with EOT PET/CT LUIS nodule decrease in size and resolution of RUL nodule: Deauville 3. On surveillance imaging and found to have worsening symptoms of fatigue and sob with hospitalization 9/2022 CTA chest showed worsening GGOs and mediastinal LAD (i.e. 2 x 1.2 --> 2.4 x 1.8 cm), concerning for recurrence of her lymphoma. Stable residual LUIS mass (1.4 x 1 cm) compared to last CT. s/p EBUS guided FNA which revealed lymphoplasmacytic cells present but not diagnostic of recurrent DLBCL. PET/CT for restaging on 10/21/22: 1. Diffuse patchy mildly FDG-avid bilateral groundglass pulmonary opacities are new as compared to prior PET/CT, and appear increased since 9/30/2022, however, comparison is limited due to differences in CT technique. An FDG-avid right upper lobe pulmonary nodule is new as compared to prior PET/CT, and increased in size and density as compared to CT dated 9/30/2022 (SUV 9.5). Findings are concerning for progression of lymphoma. Correlate clinically to exclude infection. An irregular left upper lobe pulmonary nodule containing small focus of increased FDG activity is unchanged as compared to CT dated 9/30/2022, and is decreased in size and metabolism as compared to prior PET/CT. 2. Multiple nonspecific FDG-avid mildly enlarged mediastinal and bilateral hilar lymph nodes are increased in size, number, and metabolism. Differential diagnosis includes recurrent lymphoma.3. New nonspecific difuse splenic and bone marrow hypermetabolism may be secondary to lymphoma.4. Resolution of FDG-avid left maxillary sinus mucosal thickening. New non-FDG-avid minimal right maxillary sinus mucosal thickening.  - noted to be anemic to 7.2 Hgb 10/29/22 s/p 2 units PRBC 11/1/22   - Bone marrow bx 11/1/22 negative  - s/p flex bronch, right VATS RUL lung resection and mediastinal LN dissection on 11/10 with Dr. 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 Patient has been having worsening sob and fatigue along with recent PET/CT concerning for lymphoma recurrence.     #EBV+ DLBCL  Follows with Dr. Madrigal at [facility]. Dx 2021 s/p 4 cycles R-CHOP c/b fatigue, neutropenia and thrombocytopenia, last few cycles R-miniCHOP (completed 6 cycles total on 9/9/21 with EOT PET/CT LUIS nodule decrease in size and resolution of RUL nodule: Deauville 3. On surveillance imaging and found to have worsening symptoms of fatigue and sob with hospitalization 9/2022 CTA chest showed worsening GGOs and mediastinal LAD (i.e. 2 x 1.2 --> 2.4 x 1.8 cm), concerning for recurrence of her lymphoma. Stable residual LUIS mass (1.4 x 1 cm) compared to last CT. s/p EBUS guided FNA which revealed lymphoplasmacytic cells present but not diagnostic of recurrent DLBCL. PET/CT for restaging on 10/21/22: 1. Diffuse patchy mildly FDG-avid bilateral groundglass pulmonary opacities are new as compared to prior PET/CT, and appear increased since 9/30/2022, however, comparison is limited due to differences in CT technique. An FDG-avid right upper lobe pulmonary nodule is new as compared to prior PET/CT, and increased in size and density as compared to CT dated 9/30/2022 (SUV 9.5). Findings are concerning for progression of lymphoma. Correlate clinically to exclude infection. An irregular left upper lobe pulmonary nodule containing small focus of increased FDG activity is unchanged as compared to CT dated 9/30/2022, and is decreased in size and metabolism as compared to prior PET/CT. 2. Multiple nonspecific FDG-avid mildly enlarged mediastinal and bilateral hilar lymph nodes are increased in size, number, and metabolism. Differential diagnosis includes recurrent lymphoma.3. New nonspecific difuse splenic and bone marrow hypermetabolism may be secondary to lymphoma.4. Resolution of FDG-avid left maxillary sinus mucosal thickening. New non-FDG-avid minimal right maxillary sinus mucosal thickening.  - noted to be anemic to 7.2 Hgb 10/29/22 s/p 2 units PRBC 11/1/22   - Bone marrow bx 11/1/22 negative  - s/p flex bronch, right VATS RUL lung resection and mediastinal LN dissection on 11/10 with Dr. Pettit. Preliminary result is negative for lymphoma; seems to be granuloma-like lesions and more of an infectious etiology rather than due to vasculitis. Still pending further staining.  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 Patient has been having worsening sob and fatigue along with recent PET/CT concerning for lymphoma recurrence.     #EBV+ DLBCL  Follows with Dr. Madrigal at [facility]. Dx 2021 s/p 4 cycles R-CHOP c/b fatigue, neutropenia and thrombocytopenia, last few cycles R-miniCHOP (completed 6 cycles total on 9/9/21 with EOT PET/CT LUIS nodule decrease in size and resolution of RUL nodule: Deauville 3. On surveillance imaging and found to have worsening symptoms of fatigue and sob with hospitalization 9/2022 CTA chest showed worsening GGOs and mediastinal LAD (i.e. 2 x 1.2 --> 2.4 x 1.8 cm), concerning for recurrence of her lymphoma. Stable residual LUIS mass (1.4 x 1 cm) compared to last CT. s/p EBUS guided FNA which revealed lymphoplasmacytic cells present but not diagnostic of recurrent DLBCL. PET/CT for restaging on 10/21/22: 1. Diffuse patchy mildly FDG-avid bilateral groundglass pulmonary opacities are new as compared to prior PET/CT, and appear increased since 9/30/2022, however, comparison is limited due to differences in CT technique. An FDG-avid right upper lobe pulmonary nodule is new as compared to prior PET/CT, and increased in size and density as compared to CT dated 9/30/2022 (SUV 9.5). Findings are concerning for progression of lymphoma. Correlate clinically to exclude infection. An irregular left upper lobe pulmonary nodule containing small focus of increased FDG activity is unchanged as compared to CT dated 9/30/2022, and is decreased in size and metabolism as compared to prior PET/CT. 2. Multiple nonspecific FDG-avid mildly enlarged mediastinal and bilateral hilar lymph nodes are increased in size, number, and metabolism. Differential diagnosis includes recurrent lymphoma.3. New nonspecific difuse splenic and bone marrow hypermetabolism may be secondary to lymphoma.4. Resolution of FDG-avid left maxillary sinus mucosal thickening. New non-FDG-avid minimal right maxillary sinus mucosal thickening.  - noted to be anemic to 7.2 Hgb 10/29/22 s/p 2 units PRBC 11/1/22   - Bone marrow bx 11/1/22 negative  - s/p flex bronch, right VATS RUL lung resection and mediastinal LN dissection on 11/10 with Dr. Pettit. Preliminary result is negative for lymphoma; seems to be granuloma-like lesions and more of an infectious etiology rather than due to vasculitis. Still pending further staining. However, infectious workup thus far has been negative and so our suspicion is much lower for infectious etiology.  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 Patient has been having worsening sob and fatigue along with recent PET/CT concerning for lymphoma recurrence.     #EBV+ DLBCL  Follows with Dr. Madrigal at [facility]. Dx 2021 s/p 4 cycles R-CHOP c/b fatigue, neutropenia and thrombocytopenia, last few cycles R-miniCHOP (completed 6 cycles total on 9/9/21 with EOT PET/CT LUIS nodule decrease in size and resolution of RUL nodule: Deauville 3. On surveillance imaging and found to have worsening symptoms of fatigue and sob with hospitalization 9/2022 CTA chest showed worsening GGOs and mediastinal LAD (i.e. 2 x 1.2 --> 2.4 x 1.8 cm), concerning for recurrence of her lymphoma. Stable residual LUIS mass (1.4 x 1 cm) compared to last CT. s/p EBUS guided FNA which revealed lymphoplasmacytic cells present but not diagnostic of recurrent DLBCL. PET/CT for restaging on 10/21/22: 1. Diffuse patchy mildly FDG-avid bilateral groundglass pulmonary opacities are new as compared to prior PET/CT, and appear increased since 9/30/2022, however, comparison is limited due to differences in CT technique. An FDG-avid right upper lobe pulmonary nodule is new as compared to prior PET/CT, and increased in size and density as compared to CT dated 9/30/2022 (SUV 9.5). Findings are concerning for progression of lymphoma. Correlate clinically to exclude infection. An irregular left upper lobe pulmonary nodule containing small focus of increased FDG activity is unchanged as compared to CT dated 9/30/2022, and is decreased in size and metabolism as compared to prior PET/CT. 2. Multiple nonspecific FDG-avid mildly enlarged mediastinal and bilateral hilar lymph nodes are increased in size, number, and metabolism. Differential diagnosis includes recurrent lymphoma.3. New nonspecific difuse splenic and bone marrow hypermetabolism may be secondary to lymphoma.4. Resolution of FDG-avid left maxillary sinus mucosal thickening. New non-FDG-avid minimal right maxillary sinus mucosal thickening.  - noted to be anemic to 7.2 Hgb 10/29/22 s/p 2 units PRBC 11/1/22   - Bone marrow bx 11/1/22 negative  - s/p flex bronch, right VATS RUL lung resection and mediastinal LN dissection on 11/10 with Dr. Pettit. Preliminary result is negative for lymphoma; seems to be granuloma-like lesions and more of an infectious etiology rather than due to vasculitis. Still pending further staining. However, infectious workup thus far has been negative and so our suspicion is much lower for infectious etiology.  - EBV PCR <35  - Monitor CBC with diff and TLS labs daily (LDH, uric acid, K, Phos, Ca)   - Discussed with rheumatology fellow on-call; steroids to be discussed with attending tomorrow 11/17    #ANCA Vasculitis  - azathioprine has been on hold  - given GGOs and PET/CT findings, off azathioprine  - Appreciate rheumatology recs    Milton Nye MD  Hematology/Oncology Fellow PGY-4  Pager: [facility] [phone] / [place] [phone]   After 5pm and on weekends please page on-call fellow

## 2022-11-16 NOTE — CONSULT NOTE ADULT - SUBJECTIVE AND OBJECTIVE BOX
CARDIOLOGY CONSULT - Dr. Hooker  Date of Service: 11/16/2022      HPI:  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), s/p LVATS, LUIS wedge resection (2021) direct admit for RVATS, RUL Nodule Biopsy w/ IR marking. Patient states that recently she has been feeling very fatigued. She states that moving around her house, or even to the bathroom, has been causing her to get very tired and causes her some minor chest pain, which resolves quickly with rest. She states that her breathing has been non-labored, denies dyspnea, shortness of breath, inability to catch her breath. She states that she has also not had much of an appetite over the last month and has been eating mostly soft foods. She admits to about a 5lb weight loss over the last month. She admits to a minor cough that occasionally occurs in fits and makes her feel as if she may vomit. Patient admits to some nausea and aversion to food but denies mechanical issues, inability to eat or feeling of choking. Denies hematemesis, hemoptysis.   (09 Nov 2022 12:31)      PAST MEDICAL & SURGICAL HISTORY:  ANCA-associated vasculitis      Anxiety and depression      Pulmonary embolism  2021      DVT, lower extremity  2021      GERD (gastroesophageal reflux disease)      Hyperlipidemia      Lung mass  s/p left wedge resection      DLBCL (diffuse large B cell lymphoma)      History of appendectomy      Lung nodule  pt had wedge resection in 2021      Non-Hodgkins lymphoma  chemoport inserted in 2021              PREVIOUS DIAGNOSTIC TESTING:    [ ] Echocardiogram:  [ ]  Catheterization:  [ ] Stress Test:  	    MEDICATIONS:  MEDICATIONS  (STANDING):  acetaminophen   IVPB .. 750 milliGRAM(s) IV Intermittent once  citalopram 10 milliGRAM(s) Oral daily  fluticasone propionate 50 MICROgram(s)/spray Nasal Spray 1 Spray(s) Both Nostrils two times a day  folic acid 1 milliGRAM(s) Oral daily  heparin   Injectable 5000 Unit(s) SubCutaneous every 12 hours  lidocaine   4% Patch 1 Patch Transdermal daily  melatonin 3 milliGRAM(s) Oral at bedtime  metoprolol tartrate 12.5 milliGRAM(s) Oral two times a day  pantoprazole    Tablet 40 milliGRAM(s) Oral before breakfast  polyethylene glycol 3350 17 Gram(s) Oral daily  senna 1 Tablet(s) Oral daily  sodium chloride 1 Gram(s) Oral daily      FAMILY HISTORY:  FH: lung cancer (Sibling)    FH: CAD (coronary artery disease) (Sibling)        SOCIAL HISTORY:    [ ] Non-smoker  [ ] Smoker  [ ] Alcohol    Allergies    codeine (Nausea)  doxycycline (Nausea)  penicillin (Hives)    Intolerances    	    REVIEW OF SYSTEMS:  CONSTITUTIONAL: No fever, weight loss, or fatigue  EYES: No eye pain, visual disturbances, or discharge  ENMT:  No difficulty hearing, tinnitus, vertigo; No sinus or throat pain  NECK: No pain or stiffness  RESPIRATORY: No cough, wheezing, chills or hemoptysis; No Shortness of Breath  CARDIOVASCULAR: No chest pain, palpitations, passing out, dizziness, or leg swelling  GASTROINTESTINAL: No abdominal or epigastric pain. No nausea, vomiting, or hematemesis; No diarrhea or constipation. No melena or hematochezia.  GENITOURINARY: No dysuria, frequency, hematuria, or incontinence  NEUROLOGICAL: No headaches, memory loss, loss of strength, numbness, or tremors  SKIN: No itching, burning, rashes, or lesions   	    [ ] All others negative	  [ ] Unable to obtain    PHYSICAL EXAM:  T(C): 36.9 (11-16-22 @ 12:33), Max: 37.1 (11-16-22 @ 05:45)  HR: 101 (11-16-22 @ 12:33) (101 - 121)  BP: 116/59 (11-16-22 @ 12:33) (116/59 - 160/62)  RR: 18 (11-16-22 @ 12:33) (18 - 19)  SpO2: 100% (11-16-22 @ 12:33) (96% - 100%)  Wt(kg): --  I&O's Summary    15 Nov 2022 07:01  -  16 Nov 2022 07:00  --------------------------------------------------------  IN: 240 mL / OUT: 500 mL / NET: -260 mL    16 Nov 2022 07:01  -  16 Nov 2022 15:32  --------------------------------------------------------  IN: 480 mL / OUT: 200 mL / NET: 280 mL        Appearance: Normal	  Psychiatry: A & O x 3, Mood & affect appropriate  HEENT:   Normal oral mucosa, PERRL, EOMI	  Lymphatic: No lymphadenopathy  Cardiovascular: Normal S1 S2,RRR, No JVD, No murmurs  Respiratory: Lungs clear to auscultation	  Gastrointestinal:  Soft, Non-tender, + BS	  Skin: No rashes, No ecchymoses, No cyanosis	  Neurologic: Non-focal  Extremities: Normal range of motion, No clubbing, cyanosis or edema  Vascular: Peripheral pulses palpable 2+ bilaterally    TELEMETRY: 	    ECG:  	  RADIOLOGY:  OTHER: 	  	  LABS:	 	    CARDIAC MARKERS:                                  8.1    7.52  )-----------( 203      ( 16 Nov 2022 06:39 )             26.1     11-16    134<L>  |  101  |  10  ----------------------------<  124<H>  4.6   |  22  |  0.60    Ca    8.2<L>      16 Nov 2022 13:04  Phos  2.3     11-16  Mg     2.30     11-16    TPro  5.8<L>  /  Alb  2.5<L>  /  TBili  0.5  /  DBili  x   /  AST  18  /  ALT  10  /  AlkPhos  80  11-14    PT/INR - ( 14 Nov 2022 22:00 )   PT: 14.8 sec;   INR: 1.27 ratio         PTT - ( 14 Nov 2022 22:00 )  PTT:38.7 sec  proBNP:   Lipid Profile:   HgA1c:   TSH:     ASSESSMENT/PLAN: 	              70 minutes spent on total encounter; more than 50% of the visit was spent counseling and/or coordinating care by the attending physician.

## 2022-11-16 NOTE — PROGRESS NOTE ADULT - SUBJECTIVE AND OBJECTIVE BOX
[facility]   MD RONEL FRIAS MD KRISTINE SOLTANPOUR, DO ANGELA WONG, PA    TEL:  OFFICE: [phone]  From 5pm-7am Answering Service [phone]    -- RENAL FOLLOW UP NOTE ---Date of Service 11-16-22 @ 11:30    Patient is a 81y old  Female who presents with a chief complaint of RVATS, RUL Nodule Biopsy w/ IR marking (16 Nov 2022 09:56)      Patient seen and examined at bedside. No chest pain/sob    VITALS:  T(F): 97.8 (11-16-22 @ 09:09), Max: 98.7 (11-16-22 @ 05:45)  HR: 102 (11-16-22 @ 09:09)  BP: 143/60 (11-16-22 @ 09:09)  RR: 18 (11-16-22 @ 09:09)  SpO2: 100% (11-16-22 @ 09:09)  Wt(kg): --    11-15 @ 07:01  -  11-16 @ 07:00  --------------------------------------------------------  IN: 240 mL / OUT: 500 mL / NET: -260 mL    11-16 @ 07:01  -  11-16 @ 11:30  --------------------------------------------------------  IN: 240 mL / OUT: 200 mL / NET: 40 mL          PHYSICAL EXAM:  General: NAD  Neck: No JVD  Respiratory: CTAB, no wheezes, rales or rhonchi  Cardiovascular: S1, S2, RRR  Gastrointestinal: BS+, soft, NT/ND  Extremities: No peripheral edema    Hospital Medications:   MEDICATIONS  (STANDING):  acetaminophen   IVPB .. 750 milliGRAM(s) IV Intermittent once  citalopram 10 milliGRAM(s) Oral daily  fluticasone propionate 50 MICROgram(s)/spray Nasal Spray 1 Spray(s) Both Nostrils two times a day  folic acid 1 milliGRAM(s) Oral daily  heparin   Injectable 5000 Unit(s) SubCutaneous every 12 hours  lidocaine   4% Patch 1 Patch Transdermal daily  melatonin 3 milliGRAM(s) Oral at bedtime  metoprolol tartrate 12.5 milliGRAM(s) Oral two times a day  pantoprazole    Tablet 40 milliGRAM(s) Oral before breakfast  polyethylene glycol 3350 17 Gram(s) Oral daily  senna 1 Tablet(s) Oral daily  sodium chloride 1 Gram(s) Oral daily      LABS:  11-16    133<L>  |  102  |  10  ----------------------------<  114<H>  3.9   |  23  |  0.55    Ca    7.6<L>      16 Nov 2022 06:39  Phos  2.6     11-16  Mg     1.60     11-16    TPro  5.8<L>  /  Alb  2.5<L>  /  TBili  0.5  /  DBili      /  AST  18  /  ALT  10  /  AlkPhos  80  11-14    Creatinine Trend: 0.55 <--, 0.63 <--, 0.57 <--, 0.57 <--, 0.59 <--, 0.77 <--, 0.75 <--, 0.59 <--, 0.78 <--    Phosphorus Level, Serum: 2.6 mg/dL (11-16 @ 06:39)                              8.1    7.52  )-----------( 203      ( 16 Nov 2022 06:39 )             26.1     Urine Studies:  Urinalysis - [11-15-22 @ 05:00]      Color Yellow / Appearance Clear / SG 1.019 / pH 7.0      Gluc Negative / Ketone Negative  / Bili Negative / Urobili 12 mg/dL       Blood Negative / Protein Trace / Leuk Est Negative / Nitrite Negative      RBC  / WBC  / Hyaline  / Gran  / Sq Epi  / Non Sq Epi  / Bacteria     Urine Creatinine 58      [11-15-22 @ 05:00]  Urine Protein 36      [11-15-22 @ 05:00]    Iron 97, TIBC <127, %sat --      [11-10-22 @ 06:55]  Ferritin 986      [11-10-22 @ 06:55]  Vitamin D (25OH) 38.3      [11-15-22 @ 06:47]  Lipid: chol 84, TG 81, HDL 22, LDL --      [10-01-22 @ 07:10]    HBsAb Nonreact      [11-11-22 @ 04:15]  HBsAg Nonreact      [11-11-22 @ 04:15]  HBcAb Nonreact      [11-11-22 @ 04:15]  HCV 0.10, Nonreact      [11-15-22 @ 06:47]    ANCA: cANCA Negative, pANCA Negative, atypical ANCA Indeterminate Method interference due to CATHERINE Fluorescence      [11-10-22 @ 06:55]  MPO-ANCA <5.0, interpretation: Negative      [11-10-22 @ 06:55]  PR3-ANCA <5.0, interpretation: Negative      [11-10-22 @ 06:55]    RADIOLOGY & ADDITIONAL STUDIES:

## 2022-11-16 NOTE — CHART NOTE - NSCHARTNOTEFT_GEN_A_CORE
< from: CT Angio Chest PE Protocol w/ IV Cont (11.16.22 @ 20:31) >    PROCEDURE DATE:  11/16/2022    ******PRELIMINARY REPORT******      ******PRELIMINARY REPORT******       INTERPRETATION:  No pulmonary embolus.  Trace right apical pneumothorax. Moderate right and small left pleural   effusion.  F/u official report.  The above findings were discussed with    < end of copied text >      Spoke with Thoracic Surgery PA who recommended following up with CXR in AM and continuing to monitor patient. Patient remains stable and without complaints. VSS.

## 2022-11-16 NOTE — PROGRESS NOTE ADULT - ASSESSMENT
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), s/p LVATS, LUIS wedge resection (2021) who presented as for RVATS, RUL Nodule Biopsy    fever  febrile to 101.5 on 11/14, afebrile since  no evidence of SSTI on exam, no erythema or tenderness surrounding mediport  no localizing signs of symptoms  RVP negative and SARS-CoV-2 PCR neg  quant gold negative  US LE neg for DVT  f/u blood cultures- NGTD  reports mild R sided pleurisy today  CTA chest pending  monitor off antibiotics at this time    DLBCL  concern for recurrence  s/p R VATS with RUL wedge resection and mediastinal LN dissection on 11/10/22  f/u pathology  hem/onc following    Gio Tate M.D.  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), s/p LVATS, LUIS wedge resection (2021) who presented as for RVATS, RUL Nodule Biopsy    fever  febrile to 101.5 on 11/14, afebrile since  no evidence of SSTI on exam, no erythema or tenderness surrounding mediport  no localizing signs of symptoms  RVP negative and SARS-CoV-2 PCR neg  quant gold negative  US LE neg for DVT  f/u blood cultures- NGTD  reports mild R sided pleurisy today  CTA chest pending  monitor off antibiotics at this time    DLBCL  concern for recurrence  s/p R VATS with RUL wedge resection and mediastinal LN dissection on 11/10/22  f/u pathology- heterogenous population of lymphocytes  hem/onc following    Gio Tate M.D.  [facility], Division of Infectious Diseases  [phone]  After 5pm on weekdays and all day on weekends - please call [phone]

## 2022-11-17 NOTE — PROGRESS NOTE ADULT - ASSESSMENT
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 Patient states that recently she has been feeling very fatigued.    Fatigue/dyspnea, with hx of Lymphoma: will need to r/o recurrent  - heme/onc following   - Recent TTE on 11/3/22 reviewed: normal LV. outpt card Dr. Ady Cooper  - PT also saw pulm Mack Tucker in the office, with some concern for her overall status. She was hypotensive to systolic 90s in the office.  (now BP improves s/p IVF here).   - Rheum has also been consulted to r/o vasculitis per heme/onc recommendation. labs sent.   - clinically she feels okay without chest pain.   - s/p thoracoscopic biopsy of mediastinal lymph node and Lung wedge resection 11/10/22  - f/u path results (pending)   - monitored in CT-ICU post op, remain stable. Transferred to the floor.  - PT: rehab. Pt now agreeable to rehab    Fever 101F with sinus tachycardia: r/o sepsis vs. tumor fever   - IV fluid started. sepsis work up initiated.   - UA, CXR unimpressive. RVP neg.   - no leukocytosis, remain stable clinically  - repeat labs reviewed. Procal low.  - monitoring off abx with low threshold to start abx if any decompensation.  - ID consulted.  - LE venous dopplers (-) for DVT  - CTA chest preliminarily showing moderate rt effusion  - low dose metoprolol started after IVF.  - card consulted (Dr. Hooker) appreciated  - pulmonology consult appreciated    Anemia  - hgb 8.1 lower than her baseline.  - no melena, no hematochezia. no BRBPR.   - recent Anemia work-up reviewed: normal vit b12, folate.  - repeat iron studies ordered    - s/p 2 units PRBC 11/1/22 per heme's note.   - given 1 unit prbc 11/9/22 with appropriate post transfusion hgb.    - no melena, no hematochezia. no BRBPR.   - will continue to monitor, repeat hgb stable     Leukocytosis  - stable CXR, UA. (no urinary symptoms)  - stable post transfusion hgb.     hx on ANCA+ vasculitis  - no current flair   - rheum eval per heme/onc request given pulm findings on CT chest.     Anxiety and depression  - stable, continue citalopram.  - Pt denied SI/HI ideations, denied visual and auditory hallucinations.     GERD (gastroesophageal reflux disease)  - continue PPI    Hyperlipidemia.   - continue home ezetimibe. okay to hold if nonformulary   - Lipid panel    DVT ppx

## 2022-11-17 NOTE — PROGRESS NOTE ADULT - SUBJECTIVE AND OBJECTIVE BOX
[facility], Division of Infectious Diseases  ANDREW Rodríguez Y. Patel, S. Shah, G. Bebeto  [phone]  ([phone] - weekdays after 5pm and weekends)    Name: BETTIE PRATER  Age/Gender: 81y Female  MRN: [id number]    Interval History:  Patient seen this morning.   Notes reviewed.   No concerning overnight events.  Afebrile.   reports occasional R-sided pleurisy  denies abd pain, diarrhea, dysuria    Allergies: codeine (Nausea)  doxycycline (Nausea)  penicillin (Hives)      Objective:  Vitals:   T(F): 98.1 (11-17-22 @ 05:00), Max: 99.1 (11-16-22 @ 17:30)  HR: 114 (11-17-22 @ 05:00) (101 - 116)  BP: 142/63 (11-17-22 @ 05:00) (116/59 - 157/62)  RR: 18 (11-17-22 @ 05:00) (18 - 18)  SpO2: 94% (11-17-22 @ 05:00) (92% - 100%)  Physical Examination:  General: no acute distress  HEENT: anicteric  Cardio: S1, S2, tachycardic, L chest mediport, no erythema or tenderness  Resp: L lung clear, R lung w/ decreased breath sounds  Abd: soft, NT, ND  Ext: no LE edema  Skin: warm, dry    Laboratory Studies:  CBC:                       7.9    12.35 )-----------( 213      ( 17 Nov 2022 05:10 )             25.3     WBC Trend:  12.35 11-17-22 @ 05:10  7.52 11-16-22 @ 06:39  7.71 11-15-22 @ 06:47  8.07 11-14-22 @ 22:00  6.99 11-14-22 @ 06:04  7.06 11-13-22 @ 06:10  11.70 11-12-22 @ 05:59  8.36 11-11-22 @ 04:15    CMP: 11-17    131<L>  |  98  |  9   ----------------------------<  120<H>  3.9   |  21<L>  |  0.55    Ca    7.8<L>      17 Nov 2022 05:10  Phos  1.9     11-17  Mg     1.90     11-17              Microbiology: reviewed     Culture - Blood (collected 11-14-22 @ 23:17)  Source: .Blood Blood-Peripheral  Preliminary Report (11-16-22 @ 07:01):    No growth to date.    Culture - Blood (collected 11-14-22 @ 21:40)  Source: .Blood Blood-Peripheral  Preliminary Report (11-16-22 @ 04:01):    No growth to date.        Radiology: reviewed     Medications:  acetaminophen   IVPB .. 750 milliGRAM(s) IV Intermittent once  citalopram 10 milliGRAM(s) Oral daily  fluticasone propionate 50 MICROgram(s)/spray Nasal Spray 1 Spray(s) Both Nostrils two times a day  folic acid 1 milliGRAM(s) Oral daily  heparin   Injectable 5000 Unit(s) SubCutaneous every 12 hours  HYDROmorphone   Tablet 1 milliGRAM(s) Oral every 3 hours PRN  HYDROmorphone  Injectable 0.2 milliGRAM(s) IV Push every 3 hours PRN  lidocaine   4% Patch 1 Patch Transdermal daily  melatonin 3 milliGRAM(s) Oral at bedtime  metoprolol tartrate 12.5 milliGRAM(s) Oral two times a day  naloxone Injectable 0.1 milliGRAM(s) IV Push every 3 minutes PRN  ondansetron Injectable 4 milliGRAM(s) IV Push every 6 hours PRN  pantoprazole    Tablet 40 milliGRAM(s) Oral before breakfast  polyethylene glycol 3350 17 Gram(s) Oral daily  potassium phosphate IVPB 15 milliMole(s) IV Intermittent once  senna 1 Tablet(s) Oral daily  sodium chloride 1 Gram(s) Oral daily    Antimicrobials:

## 2022-11-17 NOTE — CONSULT NOTE ADULT - CONSULT REQUESTED DATE/TIME
15-Nov-2022 11:51
09-Nov-2022 16:16
09-Nov-2022 18:08
12-Nov-2022 18:55
09-Nov-2022 11:46
16-Nov-2022 15:32
17-Nov-2022 14:02

## 2022-11-17 NOTE — PROCEDURE NOTE - NSICDXPROCEDURE_GEN_ALL_CORE_FT
PROCEDURES:  Therapeutic thoracentesis of right pleural cavity with ultrasound guidance 17-Nov-2022 14:41:02  Ben Asif

## 2022-11-17 NOTE — PROGRESS NOTE ADULT - SUBJECTIVE AND OBJECTIVE BOX
Minimal cough  Effort of breathing is about the same as yesterday  Still feels twinge of sharp rt chest discomfort every now and then    Vital Signs Last 24 Hrs  T(C): 36.7 (17 Nov 2022 05:00), Max: 37.3 (16 Nov 2022 17:30)  T(F): 98.1 (17 Nov 2022 05:00), Max: 99.1 (16 Nov 2022 17:30)  HR: 114 (17 Nov 2022 05:00) (101 - 116)  BP: 142/63 (17 Nov 2022 05:00) (116/59 - 157/62)  BP(mean): --  RR: 18 (17 Nov 2022 05:00) (18 - 18)  SpO2: 94% (17 Nov 2022 05:00) (92% - 100%)    I&O's Summary    11-16-22 @ 07:01  -  11-17-22 @ 07:00  --------------------------------------------------------  IN: 880 mL / OUT: 700 mL / NET: 180 mL        PHYSICAL EXAM:  GENERAL: NAD, thin-elderly, comfortable on room air  HEAD:  Atraumatic, Normocephalic  EYES: EOMI, PERRLA, conjunctiva and sclera clear  NECK: Supple, No JVD  CHEST/LUNG: mild decrease breath sounds bilaterally; No wheeze   HEART: Regular rate and rhythm; No murmurs, rubs, or gallops  ABDOMEN: Soft, Nontender, Nondistended; Bowel sounds present  Neuro: AAOx3, no focal weakness   EXTREMITIES:  2+ Peripheral Pulses, No clubbing, cyanosis, or edema      LABS:                        7.9    12.35 )-----------( 213      ( 17 Nov 2022 05:10 )             25.3     11-17    131<L>  |  98  |  9   ----------------------------<  120<H>  3.9   |  21<L>  |  0.55    Ca    7.8<L>      17 Nov 2022 05:10  Phos  1.9     11-17  Mg     1.90     11-17        CAPILLARY BLOOD GLUCOSE                RADIOLOGY & ADDITIONAL TESTS:    Imaging Personally Reviewed:  [x] YES  [ ] NO    Will obtain old records:  [ ] YES  [x] NO

## 2022-11-17 NOTE — PROGRESS NOTE ADULT - SUBJECTIVE AND OBJECTIVE BOX
CARDIOLOGY FOLLOW UP NOTE - DR. CRUZ    Patient Name: BETTIE PRATER  Date of Service: 11-17-22 @ 11:34    Patient seen and examined    Subjective:    cv: denies chest pain, dyspnea, palpitations, dizziness  pulmonary: denies cough  GI: denies abdominal pain, nausea, vomiting  vascular/legs: no edema   skin: no rash  ROS: otherwise negative   overnight events:      PHYSICAL EXAM:  T(C): 36.7 (11-17-22 @ 05:00), Max: 37.3 (11-16-22 @ 17:30)  HR: 114 (11-17-22 @ 05:00) (101 - 116)  BP: 142/63 (11-17-22 @ 05:00) (116/59 - 157/62)  RR: 18 (11-17-22 @ 05:00) (18 - 18)  SpO2: 94% (11-17-22 @ 05:00) (92% - 100%)  Wt(kg): --  I&O's Summary    16 Nov 2022 07:01  -  17 Nov 2022 07:00  --------------------------------------------------------  IN: 880 mL / OUT: 700 mL / NET: 180 mL      Daily     Daily     Appearance: Normal	  Cardiovascular: Normal S1 S2,RRR, No JVD, No murmurs  Respiratory: Lungs clear to auscultation	  Gastrointestinal:  Soft, Non-tender, + BS	  Extremities: Normal range of motion, No clubbing, cyanosis or edema      Home Medications:  citalopram 10 mg oral tablet: 1 tab(s) orally once a day (04 Nov 2022 15:16)  ezetimibe 10 mg oral tablet: 1 tab(s) orally once a day (04 Nov 2022 15:16)  folic acid 1 mg oral tablet: 1 tab(s) orally once a day (04 Nov 2022 15:16)  omeprazole 20 mg oral delayed release capsule: 1 cap(s) orally prn (04 Nov 2022 15:16)  Sodium Chloride 1 g oral tablet: daily (04 Nov 2022 15:16)      MEDICATIONS  (STANDING):  acetaminophen   IVPB .. 750 milliGRAM(s) IV Intermittent once  citalopram 10 milliGRAM(s) Oral daily  fluticasone propionate 50 MICROgram(s)/spray Nasal Spray 1 Spray(s) Both Nostrils two times a day  folic acid 1 milliGRAM(s) Oral daily  heparin   Injectable 5000 Unit(s) SubCutaneous every 12 hours  lidocaine   4% Patch 1 Patch Transdermal daily  melatonin 3 milliGRAM(s) Oral at bedtime  metoprolol tartrate 12.5 milliGRAM(s) Oral two times a day  pantoprazole    Tablet 40 milliGRAM(s) Oral before breakfast  polyethylene glycol 3350 17 Gram(s) Oral daily  senna 1 Tablet(s) Oral daily  sodium chloride 1 Gram(s) Oral daily      TELEMETRY: 	    ECG:  	  RADIOLOGY:   DIAGNOSTIC TESTING:  [ ] Echocardiogram:  [ ] Catheterization:  [ ] Stress Test:    OTHER: 	    LABS:	 	    CARDIAC MARKERS:        Troponin T, High Sensitivity Result: 14 ng/L (11-14 @ 22:00)                                7.9    12.35 )-----------( 213      ( 17 Nov 2022 05:10 )             25.3     11-17    131<L>  |  98  |  9   ----------------------------<  120<H>  3.9   |  21<L>  |  0.55    Ca    7.8<L>      17 Nov 2022 05:10  Phos  1.9     11-17  Mg     1.90     11-17      proBNP:     Lipid Profile:   HgA1c:     Creatinine, Serum: 0.55 mg/dL (11-17-22 @ 05:10)  Creatinine, Serum: 0.60 mg/dL (11-16-22 @ 13:04)  Creatinine, Serum: 0.55 mg/dL (11-16-22 @ 06:39)  Creatinine, Serum: 0.63 mg/dL (11-15-22 @ 06:47)  Creatinine, Serum: 0.57 mg/dL (11-14-22 @ 22:00)

## 2022-11-17 NOTE — PROGRESS NOTE ADULT - ASSESSMENT
80 yo F PMH ANCA vascultis (dx 2013), diffuse large B cell lymphoma (dx 2021) in remission, non-hodgkin's lymphoma (chemoport), depression, HLD, GERD, PE/DVT (4/2021 no longer on Eliquis), s/p LVATS, LUIS wedge resection (2021) p/w worsening weakness, weight loss, fatigue w/ new RUL mass, s/p resection and mediastinal LN dissection on 11/10. Rheumatology consulted for evaluation for vasculitis flare.     #RUL mass  =p/w worsening weakness, weight loss, fatigue, non-productive cough for last 1 month  =s/p RCHOP for lymphoma, last dose 9/2021, in remission  =RUL mass biopsy showing signs of active vasculitis vs infection. 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 P-ANCA and MPO positive. Was induced w/ high dose steroid and rituxan. Had been on maintenance azathioprine w/o reoccurrence.  =has been off her maintenance azathioprine for vasculitis since 2021 after lymphoma diagnosis  =Pt w/o current symptoms of vasculitis, except for just fatigue/weakness. Serology not showing signs of disease activity (may be altered 2/2 previous RCHOP therapy). She may have lung isolated vasculitic activity, if infection ruled out.    Serology:   Negative ANCA, MPO, PR3. UA w/o blood, does have protein creatinine ratio of 0.6 and 0.7  Negative hepatitis and QuantiFeron 11/2022. CD19%=5     Plan  -follow up official pathology report and FINAL special staining to definitively rule out any sort of occult infection  -if no infection on path, will plan for steroid therapy and steroid sparing maintenance therapy such as daily PO cellcept. Would avoid longer lasting infusions such as rituxan 2/2 to her age and risk for opportunistic infections     -discussed plan w/ her Rheumatologist Dr. Kasandra Plaza who she will need to follow up with on discharge  - repeat Urine Pr/Cr ratio (ordered)  - will follow    Discussed with Attending Dr. Calvo-Oleksandr Pulliam DO  Rheumatology Fellow  Pager: [phone]  Available on TEAMS

## 2022-11-17 NOTE — CONSULT NOTE ADULT - ASSESSMENT
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 Patient is admitted to be optimized for her surgical procedure.    Plan: OR tomorrow for RVATS, RUL Nodule Biopsy w/ IR Marking.    1: hx of lymphoma: DBLCL  2: Pleural effusion :  3: HLD:   '4: ANCA ASSOCIATED VASCULITIS      11/17:    1: hx of lymphoma: DBLCL: S/P SURGERY RUL wedge resection and LN biopsy : await results:   2: Pleural effusion : not much of chest xray  : but ct scan chest revelas more pleurl effusion on rt sdie then left:  send for chem and cultures   3: HLD: : per PMD  '4: ANCA ASSOCIATED VASCULITIS : sHE HAD FEVER:  RHEUMATOLOGY FOLLOWING ON ANTIBIOTICS:    LEIGHA THORACIC

## 2022-11-17 NOTE — PROGRESS NOTE ADULT - SUBJECTIVE AND OBJECTIVE BOX
Pt seen with Dr. Pettit this am. Events and images reviewed  Pt looks well. STates SOB not worse from PTA but states she feels  very fatigued and generally "not well"  No chest pains. Pt afebrile x 48hrs.     Vital Signs Last 24 Hrs  T(C): 36.8 (17 Nov 2022 12:11), Max: 37.3 (16 Nov 2022 17:30)  T(F): 98.3 (17 Nov 2022 12:11), Max: 99.1 (16 Nov 2022 17:30)  HR: 121 (17 Nov 2022 12:11) (106 - 121)  BP: 149/60 (17 Nov 2022 12:11) (141/53 - 157/62)  BP(mean): --  RR: 20 (17 Nov 2022 12:11) (18 - 20)  SpO2: 93% (17 Nov 2022 12:11) (92% - 94%)    Parameters below as of 17 Nov 2022 12:11  Patient On (Oxygen Delivery Method): room air    A+O x 3  BS dec to Rt base  CTA to left  Rt vATS site c/d/i  Dressing removed. Suture in place  CXR with trace Rt effusion. However, CTA from   11/16 neg for PE but showing moderate rt effusion      A/p: 80yo F with multiple medical issues s/p Rt VATs RUL wedge resection on  11/10/22 now with moderate post op effusion    -Will do bedside thoracentesis today to help improve symptoms  -Afebrile x 48hr  -Will send fluid studies  -FU CXR after tap  -CXR in am  -If CXRs stable, no objection to d/c planning  Care per primary team.

## 2022-11-17 NOTE — CONSULT NOTE ADULT - SUBJECTIVE AND OBJECTIVE BOX
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 Patient states that recently she has been feeling very fatigued. She states that moving around her house, or even to the bathroom, has been causing her to get very tired and causes her some minor chest pain, which resolves quickly with rest. She states that her breathing has been non-labored, denies dyspnea, shortness of breath, inability to catch her breath. She states that she has also not had much of an appetite over the last month and has been eating mostly soft foods. She admits to about a 5lb weight loss over the last month. She admits to a minor cough that occasionally occurs in fits and makes her feel as if she may vomit. Patient admits to some nausea and aversion to food but denies mechanical issues, inability to eat or feeling of choking. Denies hematemesis, hemoptysis.   (09 Nov 2022 12:31)   she also had recent ebus biopsy which was not suggestive of lymphoma:  now she had vats surgery:   doing ok : on 2 L of oxygen :   now she is  having fever: evauted by id:   for chesdt tube to drain the pleural effusion now?       ?FOLLOWING PRESENT  [ x] Hx of PE/DVT, [x ] Hx COPD, [ x] Hx of Asthma, [y ] Hx of Hospitalization, x ]  Hx of BiPAP/CPAP use, [ x] Hx of KATE    Allergies    codeine (Nausea)  doxycycline (Nausea)  penicillin (Hives)    Intolerances        PAST MEDICAL & SURGICAL HISTORY:  ANCA-associated vasculitis      Anxiety and depression      Pulmonary embolism  2021      DVT, lower extremity  2021      GERD (gastroesophageal reflux disease)      Hyperlipidemia      Lung mass  s/p left wedge resection      DLBCL (diffuse large B cell lymphoma)      History of appendectomy      Lung nodule  pt had wedge resection in 2021      Non-Hodgkins lymphoma  chemoport inserted in 2021          FAMILY HISTORY:  FH: lung cancer (Sibling)    FH: CAD (coronary artery disease) (Sibling)        Social History: [ x ] TOBACCO                  [ x ] ETOH                                 [ x ] IVDA/DRUGS    REVIEW OF SYSTEMS      General:	x    Skin/Breast:x  	  Ophthalmologic:x  	  ENMT:	x    Respiratory and Thorax:  some chest pain  	  Cardiovascular:	x    Gastrointestinal:	x    Genitourinary:	x    Musculoskeletal:	x    Neurological:	x    Psychiatric:	x    Hematology/Lymphatics:	x    Endocrine:	x    Allergic/Immunologic:	x    MEDICATIONS  (STANDING):  acetaminophen   IVPB .. 750 milliGRAM(s) IV Intermittent once  citalopram 10 milliGRAM(s) Oral daily  fluticasone propionate 50 MICROgram(s)/spray Nasal Spray 1 Spray(s) Both Nostrils two times a day  folic acid 1 milliGRAM(s) Oral daily  heparin   Injectable 5000 Unit(s) SubCutaneous every 12 hours  lidocaine   4% Patch 1 Patch Transdermal daily  melatonin 3 milliGRAM(s) Oral at bedtime  metoprolol tartrate 12.5 milliGRAM(s) Oral two times a day  pantoprazole    Tablet 40 milliGRAM(s) Oral before breakfast  polyethylene glycol 3350 17 Gram(s) Oral daily  senna 1 Tablet(s) Oral daily    MEDICATIONS  (PRN):  HYDROmorphone   Tablet 1 milliGRAM(s) Oral every 3 hours PRN Moderate-Severe Pain (4 - 10)  HYDROmorphone  Injectable 0.2 milliGRAM(s) IV Push every 3 hours PRN Severe Breakthrough Pain (7 - 10)  naloxone Injectable 0.1 milliGRAM(s) IV Push every 3 minutes PRN For ANY of the following changes in patient status:  A. RR LESS THAN 10 breaths per minute, B. Oxygen saturation LESS THAN 90%, C. Sedation score of 6  ondansetron Injectable 4 milliGRAM(s) IV Push every 6 hours PRN Nausea       Vital Signs Last 24 Hrs  T(C): 36.8 (17 Nov 2022 12:11), Max: 37.3 (16 Nov 2022 17:30)  T(F): 98.3 (17 Nov 2022 12:11), Max: 99.1 (16 Nov 2022 17:30)  HR: 121 (17 Nov 2022 12:11) (106 - 121)  BP: 149/60 (17 Nov 2022 12:11) (141/53 - 157/62)  BP(mean): --  RR: 20 (17 Nov 2022 12:11) (18 - 20)  SpO2: 93% (17 Nov 2022 12:11) (92% - 94%)    Parameters below as of 17 Nov 2022 12:11  Patient On (Oxygen Delivery Method): room air    Orthostatic VS          I&O's Summary    16 Nov 2022 07:01  -  17 Nov 2022 07:00  --------------------------------------------------------  IN: 880 mL / OUT: 700 mL / NET: 180 mL        Physical Exam:   GENERAL: NAD, well-groomed, well-developed  HEENT: RANJIT/   Atraumatic, Normocephalic  ENMT: No tonsillar erythema, exudates, or enlargement; Moist mucous membranes, Good dentition, No lesions  NECK: Supple, No JVD, Normal thyroid  CHEST/LUNG: decreased air entry bilaterally:   CVS: Regular rate and rhythm; No murmurs, rubs, or gallops  GI: : Soft, Nontender, Nondistended; Bowel sounds present  NERVOUS SYSTEM:  Alert & Oriented X3  EXTREMITIES:  -edema  LYMPH: No lymphadenopathy noted  SKIN: No rashes or lesions  ENDOCRINOLOGY: No Thyromegaly  PSYCH: Appropriate    Labs:  SARS-CoV-2: NotDetec (15 Nov 2022 01:00)  COVID-19 PCR: NotDetec (09 Nov 2022 16:30)                              7.9    12.35 )-----------( 213      ( 17 Nov 2022 05:10 )             25.3                         8.1    7.52  )-----------( 203      ( 16 Nov 2022 06:39 )             26.1                         9.7    7.71  )-----------( 268      ( 15 Nov 2022 06:47 )             32.6                         8.6    8.07  )-----------( 266      ( 14 Nov 2022 22:00 )             27.9                         8.8    6.99  )-----------( 309      ( 14 Nov 2022 06:04 )             29.4     11-17    131<L>  |  98  |  9   ----------------------------<  120<H>  3.9   |  21<L>  |  0.55  11-16    134<L>  |  101  |  10  ----------------------------<  124<H>  4.6   |  22  |  0.60  11-16    133<L>  |  102  |  10  ----------------------------<  114<H>  3.9   |  23  |  0.55  11-15    135  |  102  |  9   ----------------------------<  115<H>  4.0   |  24  |  0.63  11-14    136  |  103  |  10  ----------------------------<  118<H>  4.0   |  23  |  0.57  11-14    137  |  106  |  14  ----------------------------<  93  4.0   |  21<L>  |  0.57    Ca    7.8<L>      17 Nov 2022 05:10  Ca    8.2<L>      16 Nov 2022 13:04  Ca    7.6<L>      16 Nov 2022 06:39  Phos  1.9     11-17  Phos  2.3     11-16  Phos  2.6     11-16  Mg     1.90     11-17  Mg     2.30     11-16  Mg     1.60     11-16    TPro  5.8<L>  /  Alb  2.5<L>  /  TBili  0.5  /  DBili  x   /  AST  18  /  ALT  10  /  AlkPhos  80  11-14  TPro  5.7<L>  /  Alb  2.3<L>  /  TBili  0.5  /  DBili  x   /  AST  19  /  ALT  6   /  AlkPhos  74  11-14    CAPILLARY BLOOD GLUCOSE              Culture - Blood (collected 14 Nov 2022 23:17)  Source: .Blood Blood-Peripheral  Preliminary Report (16 Nov 2022 07:01):    No growth to date.    Culture - Blood (collected 14 Nov 2022 21:40)  Source: .Blood Blood-Peripheral  Preliminary Report (16 Nov 2022 04:01):    No growth to date.      D DImerLactate, Blood: 2.4 mmol/L (11-15 @ 07:55)  Lactate, Blood: 1.4 mmol/L (11-14 @ 22:00)    Procalcitonin, Serum: 0.21 ng/mL (11-14 @ 22:00)      Studies  Chest X-RAY  CT SCAN Chest   CT Abdomen  Venous Dopplers: LE:   Others      rad< from: CT Angio Chest PE Protocol w/ IV Cont (11.16.22 @ 20:31) >  terminating in the right atrium. Degenerative changes of the spine.   Unchanged compression deformities of T11 and L1 vertebral bodies.    IMPRESSION:    No pulmonary embolism.    Status post right upper lobe recent wedge resection with associated   postsurgical change. Right upper paratracheal 1.9 right 2.6 cm   hypodensity as described above may be postoperative in etiology. 3 month   follow-up noncontrast chest CT can be performed for further evaluation.    < end of copied text >

## 2022-11-17 NOTE — PROGRESS NOTE ADULT - SUBJECTIVE AND OBJECTIVE BOX
incomplete BETTIE DE LA ROSASeale  [id number]    INTERVAL HPI/OVERNIGHT EVENTS: Pt says she is feeling okay. Admits to fatigue, weakness, cough, and weight loss. Denies fever, chills, chest pain, sob, epistaxis, rash, sinusitis, hemoptysis, neuropathy.     MEDICATIONS  (STANDING):  acetaminophen   IVPB .. 750 milliGRAM(s) IV Intermittent once  citalopram 10 milliGRAM(s) Oral daily  fluticasone propionate 50 MICROgram(s)/spray Nasal Spray 1 Spray(s) Both Nostrils two times a day  folic acid 1 milliGRAM(s) Oral daily  heparin   Injectable 5000 Unit(s) SubCutaneous every 12 hours  lidocaine   4% Patch 1 Patch Transdermal daily  melatonin 3 milliGRAM(s) Oral at bedtime  metoprolol tartrate 12.5 milliGRAM(s) Oral two times a day  pantoprazole    Tablet 40 milliGRAM(s) Oral before breakfast  polyethylene glycol 3350 17 Gram(s) Oral daily  senna 1 Tablet(s) Oral daily    MEDICATIONS  (PRN):  HYDROmorphone   Tablet 1 milliGRAM(s) Oral every 3 hours PRN Moderate-Severe Pain (4 - 10)  HYDROmorphone  Injectable 0.2 milliGRAM(s) IV Push every 3 hours PRN Severe Breakthrough Pain (7 - 10)  naloxone Injectable 0.1 milliGRAM(s) IV Push every 3 minutes PRN For ANY of the following changes in patient status:  A. RR LESS THAN 10 breaths per minute, B. Oxygen saturation LESS THAN 90%, C. Sedation score of 6  ondansetron Injectable 4 milliGRAM(s) IV Push every 6 hours PRN Nausea      Allergies    codeine (Nausea)  doxycycline (Nausea)  penicillin (Hives)    Intolerances        Review of Systems:  As above    Vital Signs Last 24 Hrs  T(C): 36.8 (17 Nov 2022 12:11), Max: 37.3 (16 Nov 2022 17:30)  T(F): 98.3 (17 Nov 2022 12:11), Max: 99.1 (16 Nov 2022 17:30)  HR: 121 (17 Nov 2022 12:11) (106 - 121)  BP: 149/60 (17 Nov 2022 12:11) (141/53 - 157/62)  BP(mean): --  RR: 20 (17 Nov 2022 12:11) (18 - 20)  SpO2: 93% (17 Nov 2022 12:11) (92% - 96%)    Parameters below as of 17 Nov 2022 12:11  Patient On (Oxygen Delivery Method): room air        Physical Exam:  General: elderly, frail, breathing comfortably on room air, no distress  HEENT: EOMI, MMM, no oral ulcers  CVS: +S1/S2, RRR  Resp: CTA b/l. No crackles/wheezing  GI: Soft, NT/ND +BS  MSK: 5/5 muscle strength in neck, shoulders, arms, hands, wrists, thighs, legs, feet. No synovitis. No wrist/foot drop.   Skin: no visible rashes  Neuro: Sensation intact in b/l arms and legs    LABS:                        7.9    12.35 )-----------( 213      ( 17 Nov 2022 05:10 )             25.3     11-17    131<L>  |  98  |  9   ----------------------------<  120<H>  3.9   |  21<L>  |  0.55    Ca    7.8<L>      17 Nov 2022 05:10  Phos  1.9     11-17  Mg     1.90     11-17           BETTIE DE LA ROSASherwood  [id number]    INTERVAL HPI/OVERNIGHT EVENTS: Pt says she is feeling okay. Admits to fatigue, weakness, cough, and weight loss. Denies fever, chills, chest pain, sob, epistaxis, rash, sinusitis, hemoptysis, neuropathy.     MEDICATIONS  (STANDING):  acetaminophen   IVPB .. 750 milliGRAM(s) IV Intermittent once  citalopram 10 milliGRAM(s) Oral daily  fluticasone propionate 50 MICROgram(s)/spray Nasal Spray 1 Spray(s) Both Nostrils two times a day  folic acid 1 milliGRAM(s) Oral daily  heparin   Injectable 5000 Unit(s) SubCutaneous every 12 hours  lidocaine   4% Patch 1 Patch Transdermal daily  melatonin 3 milliGRAM(s) Oral at bedtime  metoprolol tartrate 12.5 milliGRAM(s) Oral two times a day  pantoprazole    Tablet 40 milliGRAM(s) Oral before breakfast  polyethylene glycol 3350 17 Gram(s) Oral daily  senna 1 Tablet(s) Oral daily    MEDICATIONS  (PRN):  HYDROmorphone   Tablet 1 milliGRAM(s) Oral every 3 hours PRN Moderate-Severe Pain (4 - 10)  HYDROmorphone  Injectable 0.2 milliGRAM(s) IV Push every 3 hours PRN Severe Breakthrough Pain (7 - 10)  naloxone Injectable 0.1 milliGRAM(s) IV Push every 3 minutes PRN For ANY of the following changes in patient status:  A. RR LESS THAN 10 breaths per minute, B. Oxygen saturation LESS THAN 90%, C. Sedation score of 6  ondansetron Injectable 4 milliGRAM(s) IV Push every 6 hours PRN Nausea      Allergies    codeine (Nausea)  doxycycline (Nausea)  penicillin (Hives)    Intolerances        Review of Systems:  As above    Vital Signs Last 24 Hrs  T(C): 36.8 (17 Nov 2022 12:11), Max: 37.3 (16 Nov 2022 17:30)  T(F): 98.3 (17 Nov 2022 12:11), Max: 99.1 (16 Nov 2022 17:30)  HR: 121 (17 Nov 2022 12:11) (106 - 121)  BP: 149/60 (17 Nov 2022 12:11) (141/53 - 157/62)  BP(mean): --  RR: 20 (17 Nov 2022 12:11) (18 - 20)  SpO2: 93% (17 Nov 2022 12:11) (92% - 96%)    Parameters below as of 17 Nov 2022 12:11  Patient On (Oxygen Delivery Method): room air        Physical Exam:  General: elderly, frail, breathing comfortably on room air, no distress  HEENT: EOMI, MMM, no oral ulcers  CVS: +S1/S2, RRR  Resp: CTA b/l. No crackles/wheezing  GI: Soft, NT/ND +BS  MSK: 5/5 muscle strength in neck, shoulders, arms, hands, wrists, thighs, legs, feet. No synovitis. No wrist/foot drop.   Skin: no visible rashes  Neuro: Sensation intact in b/l arms and legs    LABS:                        7.9    12.35 )-----------( 213      ( 17 Nov 2022 05:10 )             25.3     11-17    131<L>  |  98  |  9   ----------------------------<  120<H>  3.9   |  21<L>  |  0.55    Ca    7.8<L>      17 Nov 2022 05:10  Phos  1.9     11-17  Mg     1.90     11-17    Hematopathology Report:   ACCESSION No: [id number]   Patient: BETTIE PRATER   Accession: [id number]   Collected Date/Time: 11/10/2022 13:28 EST   Received Date/Time: 11/11/2022 06:23 EST   Hematopathology Report - Auth (Verified)   Specimen(s) Submitted   1- Wedge resection right upper lobe   2- R4   3- Level 10   4- R4b   5- R4c   6- R4d   7- R4e   8- R2   9- R4f   10- Level 7   Final Diagnosis   1. Lung, wedge resection, right upper lobe:   - Lung wedge with inflammatory nodule showing marked type II   pneumocyte hyperplasia. Adjacent lung tissue shows foci of capillaritis   with fibrin as well as giant cells and inflammation around small   and intermediate sized vessels. Some hemosiderin is also present in   association with fibrin. Elastic stain supports vasculocentricity of the   inflammation. Special stains are pending to rule out an infectious cause   for the inflammation. No evidence of carcinoma. No evidence of lymphoma   is seen, see Note.   2. Lymph node, right level 4:   - Lymph nodes, reactive. Focal giant cells and focal necrosis are   seen in the lymph node as well. No evidence of lymphoma.   3. Lymph node, level 10:   -Lymph node, benign and reactive. No evidence of lymphoma.   4. Lymph node, R4B:   - Lymph node, benign and reactive. No evidence of lymphoma.   5. Lymph node, R4C:   - Lymph node, benign and reactive. No evidence of lymphoma. Giant   cells are seen in the lymph node.   6. Lymph node, R4D:   - Lymph node, benign and reactive. No evidence of lymphoma.   7. Lymph node, R4E:   - Lymph node, benign and reactive. No evidence of lymphoma. Giant   cells are seen in the lymph node. Focal necrosis is seen in the lymph   node.   8. Lymph node, R2:   - Lymph node, benign and reactive. No evidence of lymphoma. Rare   giant cells are seen in the lymph nodes.   9. Lymph node, R4F:   - Lymph node, benign and reactive. No evidence of lymphoma. Giant   cells are seen in lymph node. Inflammation is present in adipose tissue.   10. Lymph node, level 7:   - Lymph node, benign and reactive. No evidence of lymphoma.   Giant cells are seen in the lymph node multifocally. A perivascular   area of inflammation with giant cells is seen within the lymph node.   Immunohistochemistry for CD20, PAX5, and in situ hybridization for   EBV are negative. CD30 highlights scattered cells that are considered   reactive.   Note: Sections of the lung and lymph node were shown to Hematopathology.   Immunohistochemistry of the lung lesion demonstrates a marked type II   pneumocyte hyperplasia with atypia in association with inflammation.   Immunohistochemistry for TTF-1 highlights atypical pneumocyte   proliferation and this was the proliferation resulting in the false   positive diagnosis on frozen, as it mimicked adenocarcinoma. It is   however not considered to be carcinoma.   Immunohistochemistry for CD20, PAX5 and in situ hybridization for EBV   were also performed on the lung lesion andwere negative. CD30 also   highlighted scattered reactive cells. The prior lymphoma was reviewed   (Diffuse VEENA positive, CD20 positive, CD30 positive).   It is also of note that clinical and microbiology evaluation for   respiratory viruses including RSV are negative   In summary, both the lymph nodes and lung do not show evidence of EBV   positive B-cell lymphoma. The differential diagnosis remains between   recurrence of her ANCA positive vasculitis and infection. While these   2 can overlap, the presence of capillaritis and vasculocentricity of   the inflammation, not only in the lung nodule, but also in the lymph   nodes, favors the diagnosis of vasculitis. Special stains are pending   to rule out infection and this should also be correlated with the   results of culture. However if special stains are negative and cultures   remain negative the findings in the lung are compatible with pulmonary   involvement by an ANCA positive vasculitis, and the giant cells and   necrosis in the lymph nodes are likely a manifestation of this as well.   Verified by: Ruben Nicholson MD   (Electronic Signature)   Reported on: 11/17/22 09:50 [facility], [facility], [address] [place] [address]   Phone: [phone] Fax: [phone]

## 2022-11-17 NOTE — PROGRESS NOTE ADULT - ASSESSMENT
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), s/p LVATS, LUIS wedge resection (2021) who presented as for RVATS, RUL Nodule Biopsy    fever  febrile to 101.5 on 11/14, afebrile since  no evidence of SSTI on exam, no erythema or tenderness surrounding mediport  no localizing signs of symptoms  RVP negative and SARS-CoV-2 PCR neg  quant gold negative  US LE neg for DVT  f/u blood cultures- NGTD  CTA chest- trace right apical pneumothorax. Moderate right and small left pleural effusion.  f/u official CTA read  monitor off antibiotics for now  given leukocytosis and recent fever would perform thoracentesis if can be performed safely; would send for cell count w/ diff, protein, glucose, LDH, culture, gram stain, cytology    DLBCL  s/p R VATS with RUL wedge resection and mediastinal LN dissection on 11/10/22  pathology- heterogenous population of lymphocytes; not consistent w/ recurrence  hem/onc following    penicillin allergy  reports reaction- welts as a child  tolerated ceftriaxone last month    Gio Tate M.D.  [facility], Division of Infectious Diseases  [phone]  After 5pm on weekdays and all day on weekends - please call [phone]  80 y/o F PMhx diffuse large B cell lymphoma s/p R-CHOP, depression, GERD, PE/DVT (4/2021 no longer on Eliquis), ANCA-vasculitis (22 y/a, not on therapy), s/p LVATS, LUIS wedge resection (2021) who presented as for RVATS, RUL Nodule Biopsy    fever  febrile to 101.5 on 11/14, afebrile since  no evidence of SSTI on exam, no erythema or tenderness surrounding mediport  no localizing signs of symptoms  RVP negative and SARS-CoV-2 PCR neg  quant gold negative  US LE neg for DVT  f/u blood cultures- NGTD  CTA chest- trace right apical pneumothorax. Moderate right and small left pleural effusion.  f/u official CTA read  monitor off antibiotics for now  given leukocytosis and recent fever would perform R thoracentesis if can be performed safely; would send for cell count w/ diff, protein, glucose, LDH, culture, gram stain, cytology    DLBCL  s/p R VATS with RUL wedge resection and mediastinal LN dissection on 11/10/22  pathology- heterogenous population of lymphocytes; not consistent w/ recurrence  hem/onc following    penicillin allergy  reports reaction- welts as a child  tolerated ceftriaxone last month    Gio Tate M.D.  [facility] [place], Division of Infectious Diseases  [phone]  After 5pm on weekdays and all day on weekends - please call [phone]

## 2022-11-17 NOTE — CONSULT NOTE ADULT - REASON FOR ADMISSION
RVATS, RUL Nodule Biopsy w/ IR marking

## 2022-11-17 NOTE — PROGRESS NOTE ADULT - ASSESSMENT
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), s/p LVATS, LUIS wedge resection (2021) direct admit for RVATS, RUL Nodule Biopsy w/ IR marking    #Tachycardia  -likely driven by fever, overall medical condition  -recent echo w normal LVEF and mild-moderate MS  -s/p IVF  -no need to repeat ECHO  -low dose BB to allow better diastolic filling if BP will allow    #B cell Lymphoma s/p RVATS, RUL nodule biopsy  -followup path  -onc followup    #H/o DVT/PE  -LE dopplers neg  -CTPA noted, no pe  -some suggestion of pulmonary edema, effusions  -clinically not overloaded  -diuretics prn for inc dyspnea/hypoxia   -d/c ivf     35 minutes spent on total encounter; more than 50% of the visit was spent counseling and/or coordinating care by the attending physician.

## 2022-11-17 NOTE — CONSULT NOTE ADULT - CONSULT REASON
Follow up and co-management of medical problems
h/o DLBCL, concern for relapse
tachycardia
Hx of ANCA Vasculitis; evaluate for flare
Hyponatremia
fever
pl effusion s/p vats biopsy

## 2022-11-17 NOTE — PROGRESS NOTE ADULT - ATTENDING COMMENTS
Agree with above   pt aware of and in agreement with our assessment and plan.  pt seen with son at bedside who is also aware of ongoing evaluation/potential plans.  will follow

## 2022-11-17 NOTE — PROGRESS NOTE ADULT - SUBJECTIVE AND OBJECTIVE BOX
[facility]   MD RONEL FRIAS MD KRISTINE SOLTANPOUR, DO ANGELA WONG, PA    TEL:  OFFICE: [phone]  From 5pm-7am Answering Service [phone]    -- RENAL FOLLOW UP NOTE ---Date of Service 11-17-22 @ 13:22    Patient is a 81y old  Female who presents with a chief complaint of RVATS, RUL Nodule Biopsy w/ IR marking (17 Nov 2022 11:34)      Patient seen and examined at bedside. No chest pain/sob    VITALS:  T(F): 98.3 (11-17-22 @ 12:11), Max: 99.1 (11-16-22 @ 17:30)  HR: 121 (11-17-22 @ 12:11)  BP: 149/60 (11-17-22 @ 12:11)  RR: 20 (11-17-22 @ 12:11)  SpO2: 93% (11-17-22 @ 12:11)  Wt(kg): --    11-16 @ 07:01  -  11-17 @ 07:00  --------------------------------------------------------  IN: 880 mL / OUT: 700 mL / NET: 180 mL          PHYSICAL EXAM:  General: NAD  Neck: No JVD  Respiratory: CTAB, no wheezes, rales or rhonchi  Cardiovascular: S1, S2, RRR  Gastrointestinal: BS+, soft, NT/ND  Extremities: No peripheral edema    Hospital Medications:   MEDICATIONS  (STANDING):  acetaminophen   IVPB .. 750 milliGRAM(s) IV Intermittent once  citalopram 10 milliGRAM(s) Oral daily  fluticasone propionate 50 MICROgram(s)/spray Nasal Spray 1 Spray(s) Both Nostrils two times a day  folic acid 1 milliGRAM(s) Oral daily  heparin   Injectable 5000 Unit(s) SubCutaneous every 12 hours  lidocaine   4% Patch 1 Patch Transdermal daily  melatonin 3 milliGRAM(s) Oral at bedtime  metoprolol tartrate 12.5 milliGRAM(s) Oral two times a day  pantoprazole    Tablet 40 milliGRAM(s) Oral before breakfast  polyethylene glycol 3350 17 Gram(s) Oral daily  senna 1 Tablet(s) Oral daily  sodium chloride 1 Gram(s) Oral daily      LABS:  11-17    131<L>  |  98  |  9   ----------------------------<  120<H>  3.9   |  21<L>  |  0.55    Ca    7.8<L>      17 Nov 2022 05:10  Phos  1.9     11-17  Mg     1.90     11-17      Creatinine Trend: 0.55 <--, 0.60 <--, 0.55 <--, 0.63 <--, 0.57 <--, 0.57 <--, 0.59 <--, 0.77 <--, 0.75 <--    Phosphorus Level, Serum: 1.9 mg/dL (11-17 @ 05:10)                              7.9    12.35 )-----------( 213      ( 17 Nov 2022 05:10 )             25.3     Urine Studies:  Urinalysis - [11-15-22 @ 05:00]      Color Yellow / Appearance Clear / SG 1.019 / pH 7.0      Gluc Negative / Ketone Negative  / Bili Negative / Urobili 12 mg/dL       Blood Negative / Protein Trace / Leuk Est Negative / Nitrite Negative      RBC  / WBC  / Hyaline  / Gran  / Sq Epi  / Non Sq Epi  / Bacteria     Urine Creatinine 58      [11-15-22 @ 05:00]  Urine Protein 36      [11-15-22 @ 05:00]    Iron 97, TIBC <127, %sat --      [11-10-22 @ 06:55]  Ferritin 986      [11-10-22 @ 06:55]  Vitamin D (25OH) 38.3      [11-15-22 @ 06:47]  Lipid: chol 84, TG 81, HDL 22, LDL --      [10-01-22 @ 07:10]    HBsAb Nonreact      [11-11-22 @ 04:15]  HBsAg Nonreact      [11-11-22 @ 04:15]  HBcAb Nonreact      [11-11-22 @ 04:15]  HCV 0.10, Nonreact      [11-15-22 @ 06:47]    ANCA: cANCA Negative, pANCA Negative, atypical ANCA Indeterminate Method interference due to CATHERINE Fluorescence      [11-10-22 @ 06:55]  MPO-ANCA <5.0, interpretation: Negative      [11-10-22 @ 06:55]  PR3-ANCA <5.0, interpretation: Negative      [11-10-22 @ 06:55]    RADIOLOGY & ADDITIONAL STUDIES:

## 2022-11-17 NOTE — PROGRESS NOTE ADULT - ASSESSMENT
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 Patient states that recently she has been feeling very fatigued. She states that moving around her house, or even to the bathroom, has been causing her to get very tired and causes her some minor chest pain, which resolves quickly with rest. She states that her breathing has been non-labored, denies dyspnea, shortness of breath, inability to catch her breath. She states that she has also not had much of an appetite over the last month and has been eating mostly soft foods. She admits to about a 5lb weight loss over the last month. She admits to a minor cough that occasionally occurs in fits and makes her feel as if she may vomit. Patient admits to some nausea and aversion to food but denies mechanical issues, inability to eat or feeling of choking. Denies hematemesis, hemoptysis.   (09 Nov 2022 12:31)      Hyponatremia  Likely SIADH   on na tab 1g daily  Na dropped again today  check urine na and osmo again  xray with pulm edema. clinically not overloaded. consider one dose of lasix 20x1. hold na tab  free water restriction <1L/day  monitor    Hypophosphatemia  supplemented  Check phosphorus daily.     Anemia  Defer to hematology    ANCA-associated vasculitis  Defer             hypocalcemia  sec to low alb  check vit d 1,25

## 2022-11-18 NOTE — PROGRESS NOTE ADULT - SUBJECTIVE AND OBJECTIVE BOX
Date of Service: 22 @ 13:54    Patient is a 81y old  Female who presents with a chief complaint of RVATS, RUL Nodule Biopsy w/ IR marking (2022 12:35)      Any change in ROS: s/p rt sided thorracenesis    MEDICATIONS  (STANDING):  acetaminophen   IVPB .. 750 milliGRAM(s) IV Intermittent once  cefepime   IVPB 2000 milliGRAM(s) IV Intermittent every 8 hours  citalopram 10 milliGRAM(s) Oral daily  fluticasone propionate 50 MICROgram(s)/spray Nasal Spray 1 Spray(s) Both Nostrils two times a day  folic acid 1 milliGRAM(s) Oral daily  heparin   Injectable 5000 Unit(s) SubCutaneous every 12 hours  lidocaine   4% Patch 1 Patch Transdermal daily  melatonin 3 milliGRAM(s) Oral at bedtime  metoprolol tartrate 12.5 milliGRAM(s) Oral two times a day  pantoprazole    Tablet 40 milliGRAM(s) Oral before breakfast  polyethylene glycol 3350 17 Gram(s) Oral daily  senna 1 Tablet(s) Oral daily    MEDICATIONS  (PRN):  HYDROmorphone   Tablet 1 milliGRAM(s) Oral every 3 hours PRN Moderate-Severe Pain (4 - 10)  HYDROmorphone  Injectable 0.2 milliGRAM(s) IV Push every 3 hours PRN Severe Breakthrough Pain (7 - 10)  naloxone Injectable 0.1 milliGRAM(s) IV Push every 3 minutes PRN For ANY of the following changes in patient status:  A. RR LESS THAN 10 breaths per minute, B. Oxygen saturation LESS THAN 90%, C. Sedation score of 6  ondansetron Injectable 4 milliGRAM(s) IV Push every 6 hours PRN Nausea    Vital Signs Last 24 Hrs  T(C): 36.6 (2022 11:06), Max: 37.2 (2022 20:04)  T(F): 97.8 (2022 11:06), Max: 99 (2022 20:04)  HR: 106 (2022 11:06) (103 - 110)  BP: 134/46 (2022 11:06) (131/50 - 157/64)  BP(mean): --  RR: 18 (2022 11:06) (17 - 18)  SpO2: 100% (2022 11:06) (90% - 100%)    Parameters below as of 2022 11:06  Patient On (Oxygen Delivery Method): nasal cannula  O2 Flow (L/min): 2      I&O's Summary    2022 07:01  -  2022 07:00  --------------------------------------------------------  IN: 0 mL / OUT: 100 mL / NET: -100 mL    2022 07:01  -  2022 13:54  --------------------------------------------------------  IN: 80 mL / OUT: 300 mL / NET: -220 mL          Physical Exam:   GENERAL: NAD, well-groomed, well-developed  HEENT: RANJTI/   Atraumatic, Normocephalic  ENMT: No tonsillar erythema, exudates, or enlargement; Moist mucous membranes, Good dentition, No lesions  NECK: Supple, No JVD, Normal thyroid  CHEST/LUNG: Clear to auscultaion  CVS: Regular rate and rhythm; No murmurs, rubs, or gallops  GI: : Soft, Nontender, Nondistended; Bowel sounds present  NERVOUS SYSTEM:  Alert & Oriented X3  EXTREMITIES:  - edema  LYMPH: No lymphadenopathy noted  SKIN: No rashes or lesions  ENDOCRINOLOGY: No Thyromegaly  PSYCH: Appropriate    Labs:                              7.6    18.10 )-----------( 215      ( 2022 05:15 )             24.6                         7.9    12.35 )-----------( 213      ( 2022 05:10 )             25.3                         8.1    7.52  )-----------( 203      ( 2022 06:39 )             26.1                         9.7    7.71  )-----------( 268      ( 15 Nov 2022 06:47 )             32.6                         8.6    8.07  )-----------( 266      ( 2022 22:00 )             27.9     11-18    132<L>  |  100  |  12  ----------------------------<  115<H>  4.3   |  22  |  0.60  -17    131<L>  |  98  |  9   ----------------------------<  120<H>  3.9   |  21<L>  |  0.55  11-16    134<L>  |  101  |  10  ----------------------------<  124<H>  4.6   |  22  |  0.60  11-16    133<L>  |  102  |  10  ----------------------------<  114<H>  3.9   |  23  |  0.55  -15    135  |  102  |  9   ----------------------------<  115<H>  4.0   |  24  |  0.63  -14    136  |  103  |  10  ----------------------------<  118<H>  4.0   |  23  |  0.57    Ca    7.7<L>      2022 05:15  Ca    7.8<L>      2022 05:10  Phos  2.5     11-18  Phos  1.9     11-17  Mg     1.90     11-18  Mg     1.90     11-17    TPro  6.3  /  Alb  2.3<L>  /  TBili  0.9  /  DBili  x   /  AST  14  /  ALT  6   /  AlkPhos  89  11-18  TPro  5.8<L>  /  Alb  2.5<L>  /  TBili  0.5  /  DBili  x   /  AST  18  /  ALT  10  /  AlkPhos  80  11-14    CAPILLARY BLOOD GLUCOSE          LIVER FUNCTIONS - ( 2022 05:15 )  Alb: 2.3 g/dL / Pro: 6.3 g/dL / ALK PHOS: 89 U/L / ALT: 6 U/L / AST: 14 U/L / GGT: x             Urinalysis Basic - ( 2022 20:04 )    Color: Judy / Appearance: Clear / S.044 / pH: x  Gluc: x / Ketone: Small  / Bili: Small / Urobili: >12 mg/dL   Blood: x / Protein: 100 mg/dL / Nitrite: Negative   Leuk Esterase: Negative / RBC: 12 /HPF / WBC 11 /HPF   Sq Epi: x / Non Sq Epi: 11 /HPF / Bacteria: Few      Procalcitonin, Serum: 0.21 ng/mL ( @ 22:00)  Lactate, Blood: 2.4 mmol/L (11-15 @ 07:55)  Lactate, Blood: 1.4 mmol/L ( @ 22:00)  Fluid Source --  Albumin, Fluid1.5 g/dL  Glucose, [id number] mg/dL  Protein total, Fluid2.9 g/dL  Lacatate Dehydrogenase, [id number] U/L  pH, Fluid--  Cytopathology-Non Gyn Report--        RECENT CULTURES:   @ 16:04 Pleural Fl Pleural Fluid       polymorphonuclear leukocytes seen  No organisms seen  by cytocentrifuge              @ 23:17 .Blood Blood-Peripheral              rad< from: Xray Chest 1 View- PORTABLE-Urgent (Xray Chest 1 View- PORTABLE-Urgent .) (22 @ 15:53) >    ACC: [id number] EXAM:  XR CHEST PORTABLE URGENT 1V                          PROCEDURE DATE:  2022          INTERPRETATION:  INDICATION: S/P tap - assess for pneumothorax.    COMPARISON: Earlier 22 exam - done 6:33AM    Technique: AP radiograph of the chest    FINDINGS:  Left-sided MediPort with tip at AVC/RA junction.  Heart/Vascular: Difficult to assess heart size on this AP film - the   heart is similar in appearance.  Pulmonary: Interval improvement in interstitial edema. Interval decrease   in right pleural effusion. No pneumothorax.  Bones: No acute bony finding.    Impression:    Interval improvement in interstitial edema.  Decrease in right pleural effusion.  No pneumothorax.      --- End of Report ---            ALYSSA FOSTER MD; Attending Radiologist  This document has been electronically signed. 2022 10:05AM    < end of copied text >    No growth to date.     @ 21:40 .Blood Blood-Peripheral                No growth to date.          RESPIRATORY CULTURES:          Studies  Chest X-RAY  CT SCAN Chest   Venous Dopplers: LE:   CT Abdomen  Others

## 2022-11-18 NOTE — PROGRESS NOTE ADULT - ASSESSMENT
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  s/p thoracentesis 11/17, cell count not consistent w/ empyema, f/u cultures  leukocytosis increased today  repeat blood cultures  cefepime started  trend wbc/ temps  f/u pathology & pending special stains to r/o infection    DLBCL  s/p R VATS with RUL wedge resection and mediastinal LN dissection on 11/10/22  pathology- heterogenous population of lymphocytes; not consistent w/ recurrence  hem/onc following    penicillin allergy  reports reaction- welts as a child  tolerated ceftriaxone last month    Gio Tate M.D.  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  s/p thoracentesis 11/17, exudative effusion, cell count not consistent w/ empyema, f/u cultures  leukocytosis increased today  repeat blood cultures  cefepime started  trend wbc/ temps  f/u pathology & pending special stains to r/o infection    DLBCL  s/p R VATS with RUL wedge resection and mediastinal LN dissection on 11/10/22  pathology- heterogenous population of lymphocytes; not consistent w/ recurrence  hem/onc following    penicillin allergy  reports reaction- welts as a child  tolerated ceftriaxone last month    Gio Tate M.D.  [facility], Division of Infectious Diseases  [phone]  After 5pm on weekdays and all day on weekends - please call [phone]

## 2022-11-18 NOTE — PROGRESS NOTE ADULT - SUBJECTIVE AND OBJECTIVE BOX
CARDIOLOGY FOLLOW UP NOTE - DR. CRUZ    Patient Name: EBTTIE PRATER  Date of Service: 22 @ 12:35    s/p thoracentesis     Subjective:    cv: denies chest pain, dyspnea, palpitations, dizziness  pulmonary: denies cough  GI: denies abdominal pain, nausea, vomiting  vascular/legs: no edema   skin: no rash  ROS: otherwise negative   overnight events:      PHYSICAL EXAM:  T(C): 36.6 (22 @ 11:06), Max: 37.2 (22 @ 20:04)  HR: 106 (22 @ 11:06) (103 - 110)  BP: 134/46 (22 @ 11:06) (131/50 - 157/64)  RR: 18 (22 @ 11:06) (17 - )  SpO2: 100% (22 @ 11:06) (90% - 100%)  Wt(kg): --  I&O's Summary    2022 07:  -  2022 07:00  --------------------------------------------------------  IN: 0 mL / OUT: 100 mL / NET: -100 mL    2022 07:01  -  2022 12:35  --------------------------------------------------------  IN: 80 mL / OUT: 300 mL / NET: -220 mL      Daily     Daily Weight in k.8 (2022 05:00)    Appearance: Normal	  Cardiovascular: Normal S1 S2,RRR, tachy  Respiratory: Lungs clear to auscultation	  Gastrointestinal:  Soft, Non-tender, + BS	  Extremities: Normal range of motion, No clubbing, cyanosis or edema      Home Medications:  citalopram 10 mg oral tablet: 1 tab(s) orally once a day (2022 15:16)  ezetimibe 10 mg oral tablet: 1 tab(s) orally once a day (2022 15:16)  folic acid 1 mg oral tablet: 1 tab(s) orally once a day (2022 15:16)  omeprazole 20 mg oral delayed release capsule: 1 cap(s) orally prn (2022 15:16)  Sodium Chloride 1 g oral tablet: daily (2022 15:16)      MEDICATIONS  (STANDING):  acetaminophen   IVPB .. 750 milliGRAM(s) IV Intermittent once  cefepime   IVPB 2000 milliGRAM(s) IV Intermittent every 8 hours  citalopram 10 milliGRAM(s) Oral daily  fluticasone propionate 50 MICROgram(s)/spray Nasal Spray 1 Spray(s) Both Nostrils two times a day  folic acid 1 milliGRAM(s) Oral daily  heparin   Injectable 5000 Unit(s) SubCutaneous every 12 hours  lidocaine   4% Patch 1 Patch Transdermal daily  melatonin 3 milliGRAM(s) Oral at bedtime  metoprolol tartrate 12.5 milliGRAM(s) Oral two times a day  pantoprazole    Tablet 40 milliGRAM(s) Oral before breakfast  polyethylene glycol 3350 17 Gram(s) Oral daily  senna 1 Tablet(s) Oral daily      TELEMETRY: 	    ECG:  	  RADIOLOGY:   DIAGNOSTIC TESTING:  [ ] Echocardiogram:  [ ] Catheterization:  [ ] Stress Test:    OTHER: 	    LABS:	 	    CARDIAC MARKERS:        Troponin T, High Sensitivity Result: 14 ng/L ( @ 22:00)                                7.6    18.10 )-----------( 215      ( 2022 05:15 )             24.6         132<L>  |  100  |  12  ----------------------------<  115<H>  4.3   |  22  |  0.60    Ca    7.7<L>      2022 05:15  Phos  2.5       Mg     1.90         TPro  6.3  /  Alb  2.3<L>  /  TBili  0.9  /  DBili  x   /  AST  14  /  ALT  6   /  AlkPhos  89      proBNP:     Lipid Profile:   HgA1c:     Creatinine, Serum: 0.60 mg/dL (22 @ 05:15)  Creatinine, Serum: 0.55 mg/dL (22 @ 05:10)  Creatinine, Serum: 0.60 mg/dL (22 @ 13:04)  Creatinine, Serum: 0.55 mg/dL (22 @ 06:39)

## 2022-11-18 NOTE — PROGRESS NOTE ADULT - ASSESSMENT
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 Patient states that recently she has been feeling very fatigued. She states that moving around her house, or even to the bathroom, has been causing her to get very tired and causes her some minor chest pain, which resolves quickly with rest. She states that her breathing has been non-labored, denies dyspnea, shortness of breath, inability to catch her breath. She states that she has also not had much of an appetite over the last month and has been eating mostly soft foods. She admits to about a 5lb weight loss over the last month. She admits to a minor cough that occasionally occurs in fits and makes her feel as if she may vomit. Patient admits to some nausea and aversion to food but denies mechanical issues, inability to eat or feeling of choking. Denies hematemesis, hemoptysis.   (09 Nov 2022 12:31)      Hyponatremia  Likely SIADH   stable today  xray with improving pulm edema  Na tab on hold  free water restriction <1L/day  monitor    Hypophosphatemia  supplemented  Check phosphorus daily.     Anemia  Defer to hematology    ANCA-associated vasculitis  Defer             hypocalcemia  sec to low alb  check vit d 1,25

## 2022-11-18 NOTE — PROGRESS NOTE ADULT - SUBJECTIVE AND OBJECTIVE BOX
[facility], Division of Infectious Diseases  ANDREW Rodríguez Y. Patel, S. Shah, G. Bebeto  [phone]  ([phone] - weekdays after 5pm and weekends)    Name: BETTIE PRATER  Age/Gender: 81y Female  MRN: [id number]    Interval History:  Patient seen this morning.   Notes reviewed.   No concerning overnight events.  Afebrile.     Allergies: codeine (Nausea)  doxycycline (Nausea)  penicillin (Hives)      Objective:  Vitals:   T(F): 97.3 (22 @ 05:00), Max: 99 (22 @ 20:04)  HR: 103 (22 @ 05:00) (103 - 121)  BP: 157/64 (22 @ 05:00) (131/50 - 157/64)  RR: 18 (22 @ 05:00) (18 - 20)  SpO2: 100% (22 @ 05:00) (90% - 100%)  Physical Examination:  General: no acute distress  HEENT: anicteric, NC  Cardio: S1, S2, tachycardic, L chest mediport, no erythema  Resp: R lung w/ decreased breath sounds  Abd: soft, NT, ND  Ext: no LE edema  Skin: warm, dry    Laboratory Studies:  CBC:                       7.6    18.10 )-----------( 215      ( 2022 05:15 )             24.6     WBC Trend:  18.10 22 @ 05:15  12.35 22 @ 05:10  7.52 22 @ 06:39  7.71 11-15-22 @ 06:47  8.07 22 @ 22:00  6.99 22 @ 06:04  7.06 22 @ 06:10  11.70 22 @ 05:59    CMP:     132<L>  |  100  |  12  ----------------------------<  115<H>  4.3   |  22  |  0.60    Ca    7.7<L>      2022 05:15  Phos  2.5     11-18  Mg     1.90         TPro  6.3  /  Alb  2.3<L>  /  TBili  0.9  /  DBili  x   /  AST  14  /  ALT  6   /  AlkPhos  89        LIVER FUNCTIONS - ( 2022 05:15 )  Alb: 2.3 g/dL / Pro: 6.3 g/dL / ALK PHOS: 89 U/L / ALT: 6 U/L / AST: 14 U/L / GGT: x             Urinalysis Basic - ( 2022 20:04 )    Color: Judy / Appearance: Clear / S.044 / pH: x  Gluc: x / Ketone: Small  / Bili: Small / Urobili: >12 mg/dL   Blood: x / Protein: 100 mg/dL / Nitrite: Negative   Leuk Esterase: Negative / RBC: 12 /HPF / WBC 11 /HPF   Sq Epi: x / Non Sq Epi: 11 /HPF / Bacteria: Few      Microbiology: reviewed     Culture - Body Fluid with Gram Stain (collected 22 @ 16:04)  Source: Pleural Fl Pleural Fluid  Gram Stain (22 @ 22:06):    polymorphonuclear leukocytes seen    No organisms seen    by cytocentrifuge    Culture - Blood (collected 22 @ 23:17)  Source: .Blood Blood-Peripheral  Preliminary Report (22 @ 07:01):    No growth to date.    Culture - Blood (collected 22 @ 21:40)  Source: .Blood Blood-Peripheral  Preliminary Report (22 @ 04:01):    No growth to date.        Radiology: reviewed     Medications:  acetaminophen   IVPB .. 750 milliGRAM(s) IV Intermittent once  cefepime   IVPB 2000 milliGRAM(s) IV Intermittent every 8 hours  citalopram 10 milliGRAM(s) Oral daily  fluticasone propionate 50 MICROgram(s)/spray Nasal Spray 1 Spray(s) Both Nostrils two times a day  folic acid 1 milliGRAM(s) Oral daily  heparin   Injectable 5000 Unit(s) SubCutaneous every 12 hours  HYDROmorphone   Tablet 1 milliGRAM(s) Oral every 3 hours PRN  HYDROmorphone  Injectable 0.2 milliGRAM(s) IV Push every 3 hours PRN  lidocaine   4% Patch 1 Patch Transdermal daily  melatonin 3 milliGRAM(s) Oral at bedtime  metoprolol tartrate 12.5 milliGRAM(s) Oral two times a day  naloxone Injectable 0.1 milliGRAM(s) IV Push every 3 minutes PRN  ondansetron Injectable 4 milliGRAM(s) IV Push every 6 hours PRN  pantoprazole    Tablet 40 milliGRAM(s) Oral before breakfast  polyethylene glycol 3350 17 Gram(s) Oral daily  senna 1 Tablet(s) Oral daily    Antimicrobials:  cefepime   IVPB 2000 milliGRAM(s) IV Intermittent every 8 hours

## 2022-11-18 NOTE — CHART NOTE - NSCHARTNOTEFT_GEN_A_CORE
Official pathology report special staining without findings of infection, and lung biopsy result ultimately deemed to be 2/2 vasculitis as w/ findings of capillaritis and giant cells. However, pt now w/ rising leukocytosis and started on antibiotics per ID. Will hold off on immunosuppression for now as currently being treated for active infection. Will follow.    Discussed with Attending Dr. Deny Pulliam,   Rheumatology Fellow  Pager: [phone]  Available on TEAMS

## 2022-11-18 NOTE — PROGRESS NOTE ADULT - SUBJECTIVE AND OBJECTIVE BOX
S/p 650 cc U/S-guided rt thoracentesis yesterday    Vital Signs Last 24 Hrs  T(C): 36.3 (2022 09:00), Max: 37.2 (2022 20:04)  T(F): 97.4 (2022 09:00), Max: 99 (2022 20:04)  HR: 106 (2022 09:00) (103 - 121)  BP: 149/61 (2022 09:00) (131/50 - 157/64)  BP(mean): --  RR: 17 (2022 09:00) (17 - 20)  SpO2: 100% (:) (90% - 100%)    I&O's Summary    22 @ 07:01  -  22 @ 07:00  --------------------------------------------------------  IN: 0 mL / OUT: 100 mL / NET: -100 mL    22 @ 07:01  -  22 @ 10:39  --------------------------------------------------------  IN: 80 mL / OUT: 300 mL / NET: -220 mL          PHYSICAL EXAM:  GENERAL: NAD, thin-elderly, comfortable on room air  HEAD:  Atraumatic, Normocephalic  EYES: EOMI, PERRLA, conjunctiva and sclera clear  NECK: Supple, No JVD  CHEST/LUNG: mild decrease breath sounds bilaterally; No wheeze   HEART: Regular rate and rhythm; No murmurs, rubs, or gallops  ABDOMEN: Soft, Nontender, Nondistended; Bowel sounds present  Neuro: AAOx3, no focal weakness   EXTREMITIES:  2+ Peripheral Pulses, No clubbing, cyanosis, or edema    LABS:                        7.6    18.10 )-----------( 215      ( 2022 05:15 )             24.6     11-18    132<L>  |  100  |  12  ----------------------------<  115<H>  4.3   |  22  |  0.60    Ca    7.7<L>      2022 05:15  Phos  2.5       Mg     1.90         TPro  6.3  /  Alb  2.3<L>  /  TBili  0.9  /  DBili  x   /  AST  14  /  ALT  6   /  AlkPhos  89        CAPILLARY BLOOD GLUCOSE            Urinalysis Basic - ( 2022 20:04 )    Color: Judy / Appearance: Clear / S.044 / pH: x  Gluc: x / Ketone: Small  / Bili: Small / Urobili: >12 mg/dL   Blood: x / Protein: 100 mg/dL / Nitrite: Negative   Leuk Esterase: Negative / RBC: 12 /HPF / WBC 11 /HPF   Sq Epi: x / Non Sq Epi: 11 /HPF / Bacteria: Few        RADIOLOGY & ADDITIONAL TESTS:    Imaging Personally Reviewed:  [x] YES  [ ] NO    Will obtain old records:  [ ] YES  [x] NO

## 2022-11-18 NOTE — PROGRESS NOTE ADULT - ASSESSMENT
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 Patient states that recently she has been feeling very fatigued.    Fatigue/dyspnea, with hx of Lymphoma: will need to r/o recurrent  - Recent TTE on 11/3/22 reviewed: normal LV. outpt card Dr. Ady Cooper  - PT also saw pulm Mack Tucker in the office, with some concern for her overall status. She was hypotensive to systolic 90s in the office.  (now BP improves s/p IVF here).   - Rheum has also been consulted to r/o vasculitis per heme/onc recommendation. labs sent.   - clinically she feels okay without chest pain.   - s/p thoracoscopic biopsy of mediastinal lymph node and Lung wedge resection 11/10/22  - path results favoring vasculitis, but final stains to r/o infection are still pending; no evidence for lymphoma  - appreciate rheum, heme-onc    Fever 101F with sinus tachycardia: r/o sepsis vs. tumor fever   - IV fluid started. sepsis work up initiated.   - UA, CXR unimpressive. RVP neg.   - no leukocytosis, remain stable clinically  - repeat labs reviewed. Procal low.  - LE venous dopplers (-) for DVT  - cont low-dose metoprolol  - s/p rt thoracentesis 11/17/22  - cefepime started 11/18/22 following worsened leukocytosis 11/18/22   - card consulted (Dr. Hooker) appreciated  - ID appreciated    Rt pleural effusion  - CTA chest 11/16/22 revealed moderate rt effusion  - s/p 650 cc U/S-guided rt thoracentesis 11/17/22  - exudative but no neutrophilic predominance and initial Gram stain w/o organisms  - f/u final cultures  - pulmonology consult appreciated    Anemia  - hgb 8.1 lower than her baseline.  - no melena, no hematochezia. no BRBPR.   - recent Anemia work-up reviewed: normal vit b12, folate.  - repeat iron studies ordered    - s/p 2 units PRBC 11/1/22 per heme's note.   - given 1 unit prbc 11/9/22 with appropriate post transfusion hgb.    - no melena, no hematochezia. no BRBPR.   - will continue to monitor, repeat hgb stable     Leukocytosis  - stable CXR, UA. (no urinary symptoms)  - stable post transfusion hgb.     hx on ANCA+ vasculitis  - no current flair   - rheum eval per heme/onc request given pulm findings on CT chest.     Anxiety and depression  - stable, continue citalopram.  - Pt denied SI/HI ideations, denied visual and auditory hallucinations.     GERD (gastroesophageal reflux disease)  - continue PPI    Hyperlipidemia.   - continue home ezetimibe. okay to hold if nonformulary   - Lipid panel    DVT ppx   - SC heparin    Disposition  - PT: rehab. 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 Patient states that recently she has been feeling very fatigued.    Fatigue/dyspnea, with hx of Lymphoma  - Recent TTE on 11/3/22 reviewed: normal LV. outpt card Dr. Ady Cooper  - PT also saw pulm Mack Tucker in the office, with some concern for her overall status. She was hypotensive to systolic 90s in the office.  (now BP improves s/p IVF here).   - s/p thoracoscopic biopsy of mediastinal lymph node and Lung wedge resection 11/10/22  - path results favoring vasculitis, but final stains to r/o infection are still pending; no evidence for lymphoma  - appreciate rheum, heme-onc    Fever either 2' ANCA-mediated vasculitis vs infection  - UA, CXR unimpressive. RVP neg.   - no leukocytosis, remain stable clinically  - procal low.  - LE venous dopplers (-) for DVT  - s/p rt thoracentesis 11/17/22  - cefepime started 11/18/22 following worsened leukocytosis 11/18/22   - card consulted (Dr. Hooker) appreciated  - ID appreciated    Rt pleural effusion  - CTA chest 11/16/22 revealed moderate rt effusion  - s/p 650 cc U/S-guided rt thoracentesis 11/17/22  - exudative but no neutrophilic predominance and initial Gram stain w/o organisms  - f/u final cultures  - pulmonology consult appreciated    Tachycardia likely 2' fever  - cont low-dose metoprolol    Anemia  - hgb 8.1 lower than her baseline.  - no melena, no hematochezia. no BRBPR.   - recent Anemia work-up reviewed: normal vit b12, folate.  - repeat iron studies ordered    - s/p 2 units PRBC 11/1/22 per heme's note.   - given 1 unit prbc 11/9/22 with appropriate post transfusion hgb.    - no melena, no hematochezia. no BRBPR.   - will continue to monitor, repeat hgb stable     Anxiety and depression  - stable, continue citalopram.  - Pt denied SI/HI ideations, denied visual and auditory hallucinations.     GERD (gastroesophageal reflux disease)  - continue PPI    Hyperlipidemia.   - continue home ezetimibe. okay to hold if nonformulary   - Lipid panel    DVT ppx   - SC heparin    Disposition  - PT: rehab. 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 Patient states that recently she has been feeling very fatigued.    Fatigue/dyspnea, with hx of Lymphoma  - Recent TTE on 11/3/22 reviewed: normal LV. outpt card Dr. Ady Cooper  - PT also saw pulm Mack Tucker in the office, with some concern for her overall status. She was hypotensive to systolic 90s in the office.  (now BP improves s/p IVF here).   - s/p thoracoscopic biopsy of mediastinal lymph node and Lung wedge resection 11/10/22  - path results favoring vasculitis, but final stains to r/o infection are still pending; no evidence for lymphoma  - appreciate rheum, heme-onc    Fever either 2' ANCA-mediated vasculitis vs infection  - UA, CXR unimpressive. RVP neg.   - no leukocytosis, remain stable clinically  - procal low.  - LE venous dopplers (-) for DVT  - s/p rt thoracentesis 11/17/22  - cefepime started 11/18/22 following worsened leukocytosis 11/18/22   - blood cxs 11/18/22 -->  - card consulted (Dr. Hooker) appreciated  - ID appreciated    Rt pleural effusion  - CTA chest 11/16/22 revealed moderate rt effusion  - s/p 650 cc U/S-guided rt thoracentesis 11/17/22  - exudative but no neutrophilic predominance and initial Gram stain w/o organisms  - f/u final cultures  - pulmonology consult appreciated    Tachycardia likely 2' fever  - cont low-dose metoprolol    Anemia  - hgb 8.1 lower than her baseline.  - no melena, no hematochezia. no BRBPR.   - recent Anemia work-up reviewed: normal vit b12, folate.  - repeat iron studies ordered    - s/p 2 units PRBC 11/1/22 per heme's note.   - given 1 unit prbc 11/9/22 with appropriate post transfusion hgb.    - no melena, no hematochezia. no BRBPR.   - will continue to monitor, repeat hgb stable     Anxiety and depression  - stable, continue citalopram.  - Pt denied SI/HI ideations, denied visual and auditory hallucinations.     GERD (gastroesophageal reflux disease)  - continue PPI    Hyperlipidemia.   - continue home ezetimibe. okay to hold if nonformulary   - Lipid panel    DVT ppx   - SC heparin    Disposition  - PT: rehab. Pt now agreeable to rehab

## 2022-11-18 NOTE — CHART NOTE - NSCHARTNOTEFT_GEN_A_CORE
ACP spoke with patient son Levon to provide update on his mother's care. All questions answered at this time.    Stevan Hendricks PA-C,   Internal Medicine ACP   In house pager #[phone]

## 2022-11-18 NOTE — PROGRESS NOTE ADULT - ASSESSMENT
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), s/p LVATS, LUIS wedge resection (2021) direct admit for RVATS, RUL Nodule Biopsy w/ IR marking    #Tachycardia  -stable  -likely driven by overall medical issues, deconditioning  -recent echo w normal LVEF and mild-moderate MS  -s/p IVF  -can inc bb to 25mg bid for htn/tachy    #B cell Lymphoma s/p RVATS, RUL nodule biopsy  -followup path  -onc followup  -s/p thoracentesis 11/17      #H/o DVT/PE  -LE dopplers neg  -CTPA noted, no pe  -some suggestion of pulmonary edema, effusions  -clinically not overloaded  -diuretics prn for inc dyspnea/hypoxia   -off ivf     35 minutes spent on total encounter; more than 50% of the visit was spent counseling and/or coordinating care by the attending physician.

## 2022-11-18 NOTE — PROGRESS NOTE ADULT - ASSESSMENT
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 Patient is admitted to be optimized for her surgical procedure.    Plan: OR tomorrow for RVATS, RUL Nodule Biopsy w/ IR Marking.    1: hx of lymphoma: DBLCL  2: Pleural effusion :  3: HLD:   '4: ANCA ASSOCIATED VASCULITIS      11/17:    1: hx of lymphoma: DBLCL: S/P SURGERY RUL wedge resection and LN biopsy : await results:   2: Pleural effusion : not much of chest xray  : but ct scan chest revelas more pleurl effusion on rt sdie then left:  send for chem and cultures   3: HLD: : per PMD  '4: ANCA ASSOCIATED VASCULITIS : sHE HAD FEVER:  RHEUMATOLOGY FOLLOWING ON ANTIBIOTICS:    DW THORACIC     11/18:    1: hx of lymphoma: DBLCL: S/P SURGERY RUL wedge resection and LN biopsy : await results:   2: Pleural effusion : not much of chest xray  : but ct scan chest reveals more pleural effusion on rt side then left: s/p thoracentesis : sent for cultures:  has exudative effusion ? malignancy ? lymphoma  3: HLD: : per PMD  '4: ANCA ASSOCIATED VASCULITIS : ssHE HAD FEVER:  RHEUMATOLOGY FOLLOWING ON ANTIBIOTICS: AFEBRILE IN LAST 24 HOURS AND IS ON CEFEPIME:     DW THORACIC AND ACP

## 2022-11-18 NOTE — PROGRESS NOTE ADULT - SUBJECTIVE AND OBJECTIVE BOX
[facility]   MD RONEL FRIAS MD KRISTINE SOLTANPOUR, DO ANGELA WONG, PA    TEL:  OFFICE: [phone]  From 5pm-7am Answering Service [phone]    -- RENAL FOLLOW UP NOTE ---Date of Service 11-18-22 @ 14:26    Patient is a 81y old  Female who presents with a chief complaint of RVATS, RUL Nodule Biopsy w/ IR marking (18 Nov 2022 13:54)      Patient seen and examined at bedside. has dry cough, but no sob    VITALS:  T(F): 97.8 (11-18-22 @ 11:06), Max: 99 (11-17-22 @ 20:04)  HR: 106 (11-18-22 @ 11:06)  BP: 134/46 (11-18-22 @ 11:06)  RR: 18 (11-18-22 @ 11:06)  SpO2: 100% (11-18-22 @ 11:06)  Wt(kg): --    11-17 @ 07:01  -  11-18 @ 07:00  --------------------------------------------------------  IN: 0 mL / OUT: 100 mL / NET: -100 mL    11-18 @ 07:01  -  11-18 @ 14:26  --------------------------------------------------------  IN: 80 mL / OUT: 300 mL / NET: -220 mL          PHYSICAL EXAM:  General: NAD  Neck: No JVD  Respiratory: CTAB, no wheezes, rales or rhonchi  Cardiovascular: S1, S2, RRR  Gastrointestinal: BS+, soft, NT/ND  Extremities: No peripheral edema    Hospital Medications:   MEDICATIONS  (STANDING):  acetaminophen   IVPB .. 750 milliGRAM(s) IV Intermittent once  cefepime   IVPB 2000 milliGRAM(s) IV Intermittent every 8 hours  citalopram 10 milliGRAM(s) Oral daily  fluticasone propionate 50 MICROgram(s)/spray Nasal Spray 1 Spray(s) Both Nostrils two times a day  folic acid 1 milliGRAM(s) Oral daily  heparin   Injectable 5000 Unit(s) SubCutaneous every 12 hours  lidocaine   4% Patch 1 Patch Transdermal daily  melatonin 3 milliGRAM(s) Oral at bedtime  metoprolol tartrate 25 milliGRAM(s) Oral two times a day  pantoprazole    Tablet 40 milliGRAM(s) Oral before breakfast  polyethylene glycol 3350 17 Gram(s) Oral daily  senna 1 Tablet(s) Oral daily      LABS:  11-18    132<L>  |  100  |  12  ----------------------------<  115<H>  4.3   |  22  |  0.60    Ca    7.7<L>      18 Nov 2022 05:15  Phos  2.5     11-18  Mg     1.90     11-18    TPro  6.3  /  Alb  2.3<L>  /  TBili  0.9  /  DBili      /  AST  14  /  ALT  6   /  AlkPhos  89  11-18    Creatinine Trend: 0.60 <--, 0.55 <--, 0.60 <--, 0.55 <--, 0.63 <--, 0.57 <--, 0.57 <--, 0.59 <--, 0.77 <--    Albumin, Serum: 2.3 g/dL (11-18 @ 05:15)  Phosphorus Level, Serum: 2.5 mg/dL (11-18 @ 05:15)                              7.6    18.10 )-----------( 215      ( 18 Nov 2022 05:15 )             24.6     Urine Studies:  Urinalysis - [11-17-22 @ 20:04]      Color Judy / Appearance Clear / SG 1.044 / pH 6.5      Gluc Negative / Ketone Small  / Bili Small / Urobili >12 mg/dL       Blood Small / Protein 100 mg/dL / Leuk Est Negative / Nitrite Negative      RBC 12 / WBC 11 / Hyaline 9 / Gran  / Sq Epi  / Non Sq Epi 11 / Bacteria Few    Urine Creatinine 126      [11-17-22 @ 20:04]  Urine Protein 126      [11-17-22 @ 20:04]  Urine Sodium 119      [11-17-22 @ 20:04]    Iron 97, TIBC <127, %sat --      [11-10-22 @ 06:55]  Ferritin 986      [11-10-22 @ 06:55]  Vitamin D (25OH) 38.3      [11-15-22 @ 06:47]  Lipid: chol 84, TG 81, HDL 22, LDL --      [10-01-22 @ 07:10]    HBsAb Nonreact      [11-11-22 @ 04:15]  HBsAg Nonreact      [11-11-22 @ 04:15]  HBcAb Nonreact      [11-11-22 @ 04:15]  HCV 0.10, Nonreact      [11-15-22 @ 06:47]    ANCA: cANCA Negative, pANCA Negative, atypical ANCA Indeterminate Method interference due to CATHERINE Fluorescence      [11-10-22 @ 06:55]  MPO-ANCA <5.0, interpretation: Negative      [11-10-22 @ 06:55]  PR3-ANCA <5.0, interpretation: Negative      [11-10-22 @ 06:55]    RADIOLOGY & ADDITIONAL STUDIES:

## 2022-11-19 NOTE — PROGRESS NOTE ADULT - SUBJECTIVE AND OBJECTIVE BOX
Breathing comfortably  No acute abd pain  No diarrhea    Vital Signs Last 24 Hrs  T(C): 36.5 (2022 04:30), Max: 36.9 (2022 21:11)  T(F): 97.7 (2022 04:30), Max: 98.4 (2022 21:11)  HR: 100 (2022 05:55) (92 - 106)  BP: 136/54 (2022 05:55) (128/45 - 137/54)  BP(mean): --  RR: 18 (2022 04:30) (18 - 19)  SpO2: 92% (2022 04:30) (92% - 100%)    I&O's Summary    22 @ 07:01  -  22 @ 07:00  --------------------------------------------------------  IN: 640 mL / OUT: 300 mL / NET: 340 mL        PHYSICAL EXAM:  GENERAL: NAD, thin-elderly, comfortable on room air  HEAD:  Atraumatic, Normocephalic  EYES: EOMI, PERRLA, conjunctiva and sclera clear  NECK: Supple, No JVD  CHEST/LUNG: mild decrease breath sounds bilaterally; No wheeze   HEART: Regular rate and rhythm; No murmurs, rubs, or gallops  ABDOMEN: Soft, Nontender, Nondistended; Bowel sounds present  Neuro: AAOx3, no focal weakness   EXTREMITIES:  2+ Peripheral Pulses, No clubbing, cyanosis, or edema    LABS:                        7.3    12.83 )-----------( 211      ( 2022 05:35 )             23.6         134<L>  |  102  |  15  ----------------------------<  104<H>  3.9   |  22  |  0.53    Ca    7.7<L>      2022 05:35  Phos  1.9       Mg     1.80         TPro  6.3  /  Alb  2.3<L>  /  TBili  0.9  /  DBili  x   /  AST  14  /  ALT  6   /  AlkPhos  89  11-18      CAPILLARY BLOOD GLUCOSE            Urinalysis Basic - ( 2022 20:04 )    Color: Judy / Appearance: Clear / S.044 / pH: x  Gluc: x / Ketone: Small  / Bili: Small / Urobili: >12 mg/dL   Blood: x / Protein: 100 mg/dL / Nitrite: Negative   Leuk Esterase: Negative / RBC: 12 /HPF / WBC 11 /HPF   Sq Epi: x / Non Sq Epi: 11 /HPF / Bacteria: Few        RADIOLOGY & ADDITIONAL TESTS:    Imaging Personally Reviewed:  [x] YES  [ ] NO    Will obtain old records:  [ ] YES  [x] NO

## 2022-11-19 NOTE — PROGRESS NOTE ADULT - ASSESSMENT
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  s/p thoracentesis 11/17, exudative effusion, cell count not consistent w/ empyema, f/u cultures- no growth  leukocytosis improved today  f/u repeat blood cultures  c/w cefepime 2g every 8 hours  trend wbc/ temps  f/u pathology & pending special stains to r/o infection    DLBCL  s/p R VATS with RUL wedge resection and mediastinal LN dissection on 11/10/22  pathology- heterogenous population of lymphocytes; not consistent w/ recurrence  hem/onc following    penicillin allergy  reports reaction- welts as a child  tolerated ceftriaxone last month  tolerating cefepime    Gio Tate M.D.  [facility] [place], Division of Infectious Diseases  [phone]  After 5pm on weekdays and all day on weekends - please call [phone]

## 2022-11-19 NOTE — PROGRESS NOTE ADULT - SUBJECTIVE AND OBJECTIVE BOX
CARDIOLOGY FOLLOW UP - Dr. Hooker  Date of Service: 11/19/2022  CC: no events    Review of Systems:  Constitutional: No fever, weight loss, or fatigue  Respiratory: No cough, wheezing, or hemoptysis, no shortness of breath  Cardiovascular: No chest pain, palpitations, passing out, dizziness, or leg swelling  Gastrointestinal: No abd or epigastric pain. No nausea, vomiting, or hematemesis; no diarrhea or consiptaiton, no melena or hematochezia  Vascular: No edema     TELEMETRY:    PHYSICAL EXAM:  T(C): 36.3 (11-19-22 @ 09:05), Max: 36.9 (11-18-22 @ 21:11)  HR: 90 (11-19-22 @ 09:05) (90 - 105)  BP: 130/56 (11-19-22 @ 09:05) (128/45 - 137/54)  RR: 18 (11-19-22 @ 09:05) (18 - 19)  SpO2: 95% (11-19-22 @ 09:05) (92% - 95%)  Wt(kg): --  I&O's Summary    18 Nov 2022 07:01  -  19 Nov 2022 07:00  --------------------------------------------------------  IN: 640 mL / OUT: 300 mL / NET: 340 mL        Appearance: Normal	  Cardiovascular: Normal S1 S2,RRR, No JVD, No murmurs  Respiratory: Lungs clear to auscultation	  Gastrointestinal:  Soft, Non-tender, + BS	  Extremities: Normal range of motion, No clubbing, cyanosis or edema  Vascular: Peripheral pulses palpable 2+ bilaterally       Home Medications:  citalopram 10 mg oral tablet: 1 tab(s) orally once a day (04 Nov 2022 15:16)  ezetimibe 10 mg oral tablet: 1 tab(s) orally once a day (04 Nov 2022 15:16)  folic acid 1 mg oral tablet: 1 tab(s) orally once a day (04 Nov 2022 15:16)  omeprazole 20 mg oral delayed release capsule: 1 cap(s) orally prn (04 Nov 2022 15:16)  Sodium Chloride 1 g oral tablet: daily (04 Nov 2022 15:16)        MEDICATIONS  (STANDING):  acetaminophen   IVPB .. 750 milliGRAM(s) IV Intermittent once  cefepime   IVPB 2000 milliGRAM(s) IV Intermittent every 8 hours  citalopram 10 milliGRAM(s) Oral daily  fluticasone propionate 50 MICROgram(s)/spray Nasal Spray 1 Spray(s) Both Nostrils two times a day  folic acid 1 milliGRAM(s) Oral daily  heparin   Injectable 5000 Unit(s) SubCutaneous every 12 hours  lidocaine   4% Patch 1 Patch Transdermal daily  melatonin 3 milliGRAM(s) Oral at bedtime  metoprolol tartrate 25 milliGRAM(s) Oral two times a day  pantoprazole    Tablet 40 milliGRAM(s) Oral before breakfast  polyethylene glycol 3350 17 Gram(s) Oral daily  potassium phosphate IVPB 15 milliMole(s) IV Intermittent once  senna 1 Tablet(s) Oral daily        EKG:  RADIOLOGY:  DIAGNOSTIC TESTING:  [ ] Echocardiogram:  [ ] Catherterization:  [ ] Stress Test:  OTHER:     LABS:	 	                          7.3    12.83 )-----------( 211      ( 19 Nov 2022 05:35 )             23.6     11-19    134<L>  |  102  |  15  ----------------------------<  104<H>  3.9   |  22  |  0.53    Ca    7.7<L>      19 Nov 2022 05:35  Phos  1.9     11-19  Mg     1.80     11-19    TPro  6.3  /  Alb  2.3<L>  /  TBili  0.9  /  DBili  x   /  AST  14  /  ALT  6   /  AlkPhos  89  11-18          CARDIAC MARKERS:

## 2022-11-19 NOTE — PROGRESS NOTE ADULT - SUBJECTIVE AND OBJECTIVE BOX
[facility], Division of Infectious Diseases  ANDREW Rodríguez Y. Patel, S. Shah, G. Bebeto  [phone]  ([phone] - weekdays after 5pm and weekends)    Name: BETTIE PRATER  Age/Gender: 81y Female  MRN: [id number]    Interval History:  Patient seen this morning.   Notes reviewed.   No concerning overnight events.  Afebrile.   denies any complaints  reports tolerating cefepime     Allergies: codeine (Nausea)  doxycycline (Nausea)  penicillin (Hives)      Objective:  Vitals:   T(F): 97.7 (22 @ 04:30), Max: 98.4 (22 @ 21:11)  HR: 100 (22 @ 05:55) (92 - 106)  BP: 136/54 (22 @ 05:55) (128/45 - 149/61)  RR: 18 (22 @ 04:30) (17 - 19)  SpO2: 92% (22 @ 04:30) (92% - 100%)  Physical Examination:  General: no acute distress  HEENT: anicteric, NC  Cardio: S1, S2, tachycardic, L chest mediport, no erythema  Resp: R lung w/ decreased breath sounds  Abd: soft, NT, ND  Ext: no LE edema  Skin: warm, dry    Laboratory Studies:  CBC:                       7.3    12.83 )-----------( 211      ( 2022 05:35 )             23.6     WBC Trend:  12.83 22 @ 05:35  18.10 22 @ 05:15  12.35 22 @ 05:10  7.52 22 @ 06:39  7.71 11-15-22 @ 06:47  8.07 22 @ 22:00  6.99 22 @ 06:04  7.06 22 @ 06:10    CMP: 11-19    134<L>  |  102  |  15  ----------------------------<  104<H>  3.9   |  22  |  0.53    Ca    7.7<L>      2022 05:35  Phos  1.9       Mg     1.80         TPro  6.3  /  Alb  2.3<L>  /  TBili  0.9  /  DBili  x   /  AST  14  /  ALT  6   /  AlkPhos  89        LIVER FUNCTIONS - ( 2022 05:15 )  Alb: 2.3 g/dL / Pro: 6.3 g/dL / ALK PHOS: 89 U/L / ALT: 6 U/L / AST: 14 U/L / GGT: x             Urinalysis Basic - ( 2022 20:04 )    Color: Judy / Appearance: Clear / S.044 / pH: x  Gluc: x / Ketone: Small  / Bili: Small / Urobili: >12 mg/dL   Blood: x / Protein: 100 mg/dL / Nitrite: Negative   Leuk Esterase: Negative / RBC: 12 /HPF / WBC 11 /HPF   Sq Epi: x / Non Sq Epi: 11 /HPF / Bacteria: Few      Microbiology: reviewed     Culture - Body Fluid with Gram Stain (collected 22 @ 16:04)  Source: Pleural Fl Pleural Fluid  Gram Stain (22 @ 22:06):    polymorphonuclear leukocytes seen    No organisms seen    by cytocentrifuge  Preliminary Report (22 @ 16:58):    No growth    Culture - Blood (collected 22 @ 23:17)  Source: .Blood Blood-Peripheral  Preliminary Report (22 @ 07:01):    No growth to date.    Culture - Blood (collected 22 @ 21:40)  Source: .Blood Blood-Peripheral  Preliminary Report (22 @ 04:01):    No growth to date.        Radiology: reviewed     Medications:  acetaminophen   IVPB .. 750 milliGRAM(s) IV Intermittent once  cefepime   IVPB 2000 milliGRAM(s) IV Intermittent every 8 hours  citalopram 10 milliGRAM(s) Oral daily  fluticasone propionate 50 MICROgram(s)/spray Nasal Spray 1 Spray(s) Both Nostrils two times a day  folic acid 1 milliGRAM(s) Oral daily  heparin   Injectable 5000 Unit(s) SubCutaneous every 12 hours  HYDROmorphone   Tablet 1 milliGRAM(s) Oral every 3 hours PRN  HYDROmorphone  Injectable 0.2 milliGRAM(s) IV Push every 3 hours PRN  lidocaine   4% Patch 1 Patch Transdermal daily  melatonin 3 milliGRAM(s) Oral at bedtime  metoprolol tartrate 25 milliGRAM(s) Oral two times a day  naloxone Injectable 0.1 milliGRAM(s) IV Push every 3 minutes PRN  ondansetron Injectable 4 milliGRAM(s) IV Push every 6 hours PRN  pantoprazole    Tablet 40 milliGRAM(s) Oral before breakfast  polyethylene glycol 3350 17 Gram(s) Oral daily  potassium phosphate IVPB 15 milliMole(s) IV Intermittent once  senna 1 Tablet(s) Oral daily    Antimicrobials:  cefepime   IVPB 2000 milliGRAM(s) IV Intermittent every 8 hours

## 2022-11-19 NOTE — PROGRESS NOTE ADULT - ASSESSMENT
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), s/p LVATS, LUIS wedge resection (2021) direct admit for RVATS, RUL Nodule Biopsy w/ IR marking    #Tachycardia  -stable  -likely driven by overall medical issues, deconditioning  -recent echo w normal LVEF and mild-moderate MS  -s/p IVF  -cont BB    #B cell Lymphoma s/p RVATS, RUL nodule biopsy  -followup path  -onc followup  -s/p thoracentesis 11/17      #H/o DVT/PE  -LE dopplers neg  -CTPA noted, no pe  -some suggestion of pulmonary edema, effusions  -clinically not overloaded  -diuretics prn for inc dyspnea/hypoxia   -off ivf     35 minutes spent on total encounter; more than 50% of the visit was spent counseling and/or coordinating care by the attending physician.

## 2022-11-19 NOTE — PROGRESS NOTE ADULT - ASSESSMENT
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 Patient states that recently she has been feeling very fatigued. She states that moving around her house, or even to the bathroom, has been causing her to get very tired and causes her some minor chest pain, which resolves quickly with rest. She states that her breathing has been non-labored, denies dyspnea, shortness of breath, inability to catch her breath. She states that she has also not had much of an appetite over the last month and has been eating mostly soft foods. She admits to about a 5lb weight loss over the last month. She admits to a minor cough that occasionally occurs in fits and makes her feel as if she may vomit. Patient admits to some nausea and aversion to food but denies mechanical issues, inability to eat or feeling of choking. Denies hematemesis, hemoptysis.   (09 Nov 2022 12:31)      Hyponatremia  Likely SIADH   xray with improving pulm edema  Na tab on hold  free water restriction <1L/day  Sodium improving   monitor    Hypophosphatemia  supplement as needed  Check phosphorus daily.     Anemia  Defer to hematology    ANCA-associated vasculitis  Defer             hypocalcemia  sec to low alb  vit d 1,25 ok  Monitor

## 2022-11-19 NOTE — PROGRESS NOTE ADULT - ASSESSMENT
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 Patient states that recently she has been feeling very fatigued.    Fatigue/dyspnea, with hx of Lymphoma  - Recent TTE on 11/3/22 reviewed: normal LV. outpt card Dr. Ady Cooper  - PT also saw pulm Mack Tucker in the office, with some concern for her overall status. She was hypotensive to systolic 90s in the office.  (now BP improves s/p IVF here).   - s/p thoracoscopic biopsy of mediastinal lymph node and Lung wedge resection 11/10/22  - path results favoring vasculitis, but final stains to r/o infection are still pending; no evidence for lymphoma  - appreciate rheum, heme-onc    Fever either 2' ANCA-mediated vasculitis vs infection  - UA, CXR unimpressive. RVP neg.   - no leukocytosis, remain stable clinically  - procal low.  - LE venous dopplers (-) for DVT  - s/p rt thoracentesis 11/17/22  - cefepime started 11/18/22 following worsened leukocytosis 11/18/22   - blood cxs 11/18/22 -->  - card consulted (Dr. Hooker) appreciated  - ID appreciated    Rt pleural effusion  - CTA chest 11/16/22 revealed moderate rt effusion  - s/p 650 cc U/S-guided rt thoracentesis 11/17/22  - exudative but no neutrophilic predominance and initial Gram stain w/o organisms  - f/u final cultures  - pulmonology consult appreciated    Tachycardia likely 2' fever  - cont low-dose metoprolol    Anemia  - hgb 8.1 lower than her baseline.  - no melena, no hematochezia. no BRBPR.   - recent Anemia work-up reviewed: normal vit b12, folate.  - repeat iron studies ordered    - s/p 2 units PRBC 11/1/22 per heme's note.   - given 1 unit prbc 11/9/22 with appropriate post transfusion hgb.    - no melena, no hematochezia. no BRBPR.   - will continue to monitor, repeat hgb stable     Anxiety and depression  - stable, continue citalopram.  - Pt denied SI/HI ideations, denied visual and auditory hallucinations.     GERD (gastroesophageal reflux disease)  - continue PPI    Hyperlipidemia.   - continue home ezetimibe. okay to hold if nonformulary   - Lipid panel    DVT ppx   - SC heparin    Disposition  - PT: rehab. Pt now agreeable to rehab

## 2022-11-19 NOTE — PROGRESS NOTE ADULT - SUBJECTIVE AND OBJECTIVE BOX
[facility] [place] [facility]   MD RONEL FRIAS MD RUORU WONG, PA    TEL:  FROM 9 AM to 5 PM ---OFFICE: [phone]    FROM 5 PM - 9 AM PLEASE CALL ANSWERING SERVICE: [phone]    [facility] FOLLOW UP NOTE--Date of Service 11-19-22 @ 09:31  --------------------------------------------------------------------------------  HPI:      Pt seen and examined at bedside.   Eliana SOB, chest pain     PAST HISTORY  --------------------------------------------------------------------------------  No significant changes to PMH, PSH, FHx, SHx, unless otherwise noted    ALLERGIES & MEDICATIONS  --------------------------------------------------------------------------------  Allergies    codeine (Nausea)  doxycycline (Nausea)  penicillin (Hives)    Intolerances      Standing Inpatient Medications  acetaminophen   IVPB .. 750 milliGRAM(s) IV Intermittent once  cefepime   IVPB 2000 milliGRAM(s) IV Intermittent every 8 hours  citalopram 10 milliGRAM(s) Oral daily  fluticasone propionate 50 MICROgram(s)/spray Nasal Spray 1 Spray(s) Both Nostrils two times a day  folic acid 1 milliGRAM(s) Oral daily  heparin   Injectable 5000 Unit(s) SubCutaneous every 12 hours  lidocaine   4% Patch 1 Patch Transdermal daily  melatonin 3 milliGRAM(s) Oral at bedtime  metoprolol tartrate 25 milliGRAM(s) Oral two times a day  pantoprazole    Tablet 40 milliGRAM(s) Oral before breakfast  polyethylene glycol 3350 17 Gram(s) Oral daily  potassium phosphate IVPB 15 milliMole(s) IV Intermittent once  senna 1 Tablet(s) Oral daily    PRN Inpatient Medications  HYDROmorphone   Tablet 1 milliGRAM(s) Oral every 3 hours PRN  HYDROmorphone  Injectable 0.2 milliGRAM(s) IV Push every 3 hours PRN  naloxone Injectable 0.1 milliGRAM(s) IV Push every 3 minutes PRN  ondansetron Injectable 4 milliGRAM(s) IV Push every 6 hours PRN      REVIEW OF SYSTEMS  --------------------------------------------------------------------------------  General: no fever  CVS: no chest pain  MSK: no edema     VITALS/PHYSICAL EXAM  --------------------------------------------------------------------------------  T(C): 36.5 (11-19-22 @ 04:30), Max: 36.9 (11-18-22 @ 21:11)  HR: 100 (11-19-22 @ 05:55) (92 - 106)  BP: 136/54 (11-19-22 @ 05:55) (128/45 - 137/54)  RR: 18 (11-19-22 @ 04:30) (18 - 19)  SpO2: 92% (11-19-22 @ 04:30) (92% - 100%)  Wt(kg): --        11-18-22 @ 07:01  -  11-19-22 @ 07:00  --------------------------------------------------------  IN: 640 mL / OUT: 300 mL / NET: 340 mL      Physical Exam:  	Gen: NAD  	HEENT: MMM  	Pulm: CTA B/L  	CV: S1S2  	Abd: Soft, +BS  	Ext: No LE edema B/L                      Neuro: Awake   	Skin: Warm and Dry   	Vascular access: NO HD catheter            no  lopez  LABS/STUDIES  --------------------------------------------------------------------------------              7.3    12.83 >-----------<  211      [11-19-22 @ 05:35]              23.6     134  |  102  |  15  ----------------------------<  104      [11-19-22 @ 05:35]  3.9   |  22  |  0.53        Ca     7.7     [11-19-22 @ 05:35]      Mg     1.80     [11-19-22 @ 05:35]      Phos  1.9     [11-19-22 @ 05:35]    TPro  6.3  /  Alb  2.3  /  TBili  0.9  /  DBili  x   /  AST  14  /  ALT  6   /  AlkPhos  89  [11-18-22 @ 05:15]        Uric acid 2.7      [11-19-22 @ 05:35]        [11-19-22 @ 05:35]    Creatinine Trend:  SCr 0.53 [11-19 @ 05:35]  SCr 0.60 [11-18 @ 05:15]  SCr 0.55 [11-17 @ 05:10]  SCr 0.60 [11-16 @ 13:04]  SCr 0.55 [11-16 @ 06:39]    Urinalysis - [11-17-22 @ 20:04]      Color Judy / Appearance Clear / SG 1.044 / pH 6.5      Gluc Negative / Ketone Small  / Bili Small / Urobili >12 mg/dL       Blood Small / Protein 100 mg/dL / Leuk Est Negative / Nitrite Negative      RBC 12 / WBC 11 / Hyaline 9 / Gran  / Sq Epi  / Non Sq Epi 11 / Bacteria Few    Urine Creatinine 126      [11-17-22 @ 20:04]  Urine Protein 126      [11-17-22 @ 20:04]  Urine Sodium 119      [11-17-22 @ 20:04]    Iron 97, TIBC <127, %sat --      [11-10-22 @ 06:55]  Ferritin 986      [11-10-22 @ 06:55]  Vitamin D (25OH) 38.3      [11-15-22 @ 06:47]  Lipid: chol 84, TG 81, HDL 22, LDL --      [10-01-22 @ 07:10]    HBsAb Nonreact      [11-11-22 @ 04:15]  HBsAg Nonreact      [11-11-22 @ 04:15]  HBcAb Nonreact      [11-11-22 @ 04:15]  HCV 0.10, Nonreact      [11-15-22 @ 06:47]    ANCA: cANCA Negative, pANCA Negative, atypical ANCA Indeterminate Method interference due to CATHERINE Fluorescence      [11-10-22 @ 06:55]  MPO-ANCA <5.0, interpretation: Negative      [11-10-22 @ 06:55]  PR3-ANCA <5.0, interpretation: Negative      [11-10-22 @ 06:55]

## 2022-11-20 NOTE — PROGRESS NOTE ADULT - SUBJECTIVE AND OBJECTIVE BOX
Date of Service: 11-20-22 @ 13:43    Patient is a 81y old  Female who presents with a chief complaint of RVATS, RUL Nodule Biopsy w/ IR marking (20 Nov 2022 09:53)      Any change in ROS: seems OK : no sob:  feels weak      MEDICATIONS  (STANDING):  acetaminophen   IVPB .. 750 milliGRAM(s) IV Intermittent once  cefepime   IVPB 2000 milliGRAM(s) IV Intermittent every 8 hours  citalopram 10 milliGRAM(s) Oral daily  fluticasone propionate 50 MICROgram(s)/spray Nasal Spray 1 Spray(s) Both Nostrils two times a day  folic acid 1 milliGRAM(s) Oral daily  heparin   Injectable 5000 Unit(s) SubCutaneous every 12 hours  lidocaine   4% Patch 1 Patch Transdermal daily  melatonin 3 milliGRAM(s) Oral at bedtime  metoprolol tartrate 25 milliGRAM(s) Oral two times a day  pantoprazole    Tablet 40 milliGRAM(s) Oral before breakfast  polyethylene glycol 3350 17 Gram(s) Oral daily  senna 1 Tablet(s) Oral daily  sodium chloride 1 Gram(s) Oral three times a day    MEDICATIONS  (PRN):  acetaminophen     Tablet .. 650 milliGRAM(s) Oral every 6 hours PRN Mild Pain (1 - 3)  HYDROmorphone   Tablet 1 milliGRAM(s) Oral every 3 hours PRN Moderate-Severe Pain (4 - 10)  HYDROmorphone  Injectable 0.2 milliGRAM(s) IV Push every 3 hours PRN Severe Breakthrough Pain (7 - 10)  naloxone Injectable 0.1 milliGRAM(s) IV Push every 3 minutes PRN For ANY of the following changes in patient status:  A. RR LESS THAN 10 breaths per minute, B. Oxygen saturation LESS THAN 90%, C. Sedation score of 6  ondansetron Injectable 4 milliGRAM(s) IV Push every 6 hours PRN Nausea    Vital Signs Last 24 Hrs  T(C): 36.3 (20 Nov 2022 12:29), Max: 36.9 (19 Nov 2022 16:31)  T(F): 97.4 (20 Nov 2022 12:29), Max: 98.5 (19 Nov 2022 16:31)  HR: 99 (20 Nov 2022 12:29) (77 - 101)  BP: 107/40 (20 Nov 2022 12:29) (107/40 - 132/53)  BP(mean): --  RR: 18 (20 Nov 2022 12:29) (17 - 18)  SpO2: 99% (20 Nov 2022 12:29) (93% - 99%)    Parameters below as of 20 Nov 2022 12:29  Patient On (Oxygen Delivery Method): room air        I&O's Summary    19 Nov 2022 07:01  -  20 Nov 2022 07:00  --------------------------------------------------------  IN: 906 mL / OUT: 250 mL / NET: 656 mL    20 Nov 2022 07:01  -  20 Nov 2022 13:43  --------------------------------------------------------  IN: 0 mL / OUT: 0 mL / NET: 0 mL          Physical Exam:   GENERAL: NAD, well-groomed, well-developed  HEENT: RANJIT/   Atraumatic, Normocephalic  ENMT: No tonsillar erythema, exudates, or enlargement; Moist mucous membranes, Good dentition, No lesions  NECK: Supple, No JVD, Normal thyroid  CHEST/LUNG: Clear to auscultaion  CVS: Regular rate and rhythm; No murmurs, rubs, or gallops  GI: : Soft, Nontender, Nondistended; Bowel sounds present  NERVOUS SYSTEM:  Alert & Oriented X3  EXTREMITIES:  2+ Peripheral Pulses, No clubbing, cyanosis, or edema  LYMPH: No lymphadenopathy noted  SKIN: No rashes or lesions  ENDOCRINOLOGY: No Thyromegaly  PSYCH: Appropriate    Labs:                              7.8    11.47 )-----------( 213      ( 20 Nov 2022 06:04 )             25.1                         7.3    12.83 )-----------( 211      ( 19 Nov 2022 05:35 )             23.6                         7.6    18.10 )-----------( 215      ( 18 Nov 2022 05:15 )             24.6                         7.9    12.35 )-----------( 213      ( 17 Nov 2022 05:10 )             25.3     11-20    133<L>  |  104  |  15  ----------------------------<  115<H>  3.6   |  21<L>  |  0.55  11-19    134<L>  |  102  |  15  ----------------------------<  104<H>  3.9   |  22  |  0.53  11-18    132<L>  |  100  |  12  ----------------------------<  115<H>  4.3   |  22  |  0.60  11-17    131<L>  |  98  |  9   ----------------------------<  120<H>  3.9   |  21<L>  |  0.55    Ca    7.6<L>      20 Nov 2022 06:04  Ca    7.7<L>      19 Nov 2022 05:35  Phos  2.9     11-20  Phos  1.9     11-19  Mg     1.90     11-20  Mg     1.80     11-19    TPro  6.3  /  Alb  2.3<L>  /  TBili  0.9  /  DBili  x   /  AST  14  /  ALT  6   /  AlkPhos  89  11-18    CAPILLARY BLOOD GLUCOSE            < from: Xray Chest 1 View- PORTABLE-Routine (Xray Chest 1 View- PORTABLE-Routine in AM.) (11.18.22 @ 08:26) >  INTERPRETATION:  CLINICAL INFORMATION: Follow-up post thoracotomy with   effusion.    TIME OF EXAMINATION: November 18 at 6:28 AM    EXAM: Portable chest    FINDINGS:  The lungs are free of focal abnormalities. Hazy lung bases has the   appearance of trace layering effusions. Left-sided chemotherapy port in   place. No pneumothorax.        COMPARISON: November 17        IMPRESSION: Clear lungs with bilateral trace pleural effusions.    --- End of Report ---            DANIEL NASSAR MD; Attending Radiologist  This document has been electronically signed. Nov 18 2022  2:11PM    < end of copied text >      Fluid Source --  Albumin, Fluid1.5 g/dL  Glucose, [id number] mg/dL  Protein total, Fluid2.9 g/dL  Lacatate Dehydrogenase, [id number] U/L  pH, Fluid7.7  Cytopathology-Non Gyn Report--        RECENT CULTURES:  11-18 @ 09:45 .Blood Blood-Peripheral                No growth to date.    11-18 @ 06:00 .Blood Blood-Peripheral                No growth to date.    11-17 @ 16:04 Pleural Fl Pleural Fluid       polymorphonuclear leukocytes seen  No organisms seen  by cytocentrifuge           No growth    11-14 @ 23:17 .Blood Blood-Peripheral                No Growth Final    11-14 @ 21:40 .Blood Blood-Peripheral                No Growth Final          RESPIRATORY CULTURES:          Studies  Chest X-RAY  CT SCAN Chest   Venous Dopplers: LE:   CT Abdomen  Others

## 2022-11-20 NOTE — PROGRESS NOTE ADULT - ASSESSMENT
80 y/o F PMhx diffuse large B cell lymphoma s/p R-CHOP, depression, GERD, PE/DVT (4/2021 no longer on Eliquis), ANCA-vasculitis (20 y/a, not on therapy), s/p LVATS, LUIS wedge resection (2021) who presented as for RVATS, RUL Nodule Biopsy    fever  febrile to 101.5 on 11/14, afebrile since  no evidence of SSTI on exam, no erythema or tenderness surrounding mediport  no localizing signs of symptoms  RVP negative and SARS-CoV-2 PCR neg  quant gold negative  US LE neg for DVT  CTA chest- Moderate right and small left pleural effusions with some interlobular septal thickening suggestive of pulmonary edema.Small right pneumothorax.  s/p thoracentesis 11/17, exudative effusion, cell count not consistent w/ empyema, f/u cultures- no growth  leukocytosis continues to improve  f/u blood cultures- NGTD  c/w cefepime 2g every 8 hours for now  trend wbc/ temps  f/u pathology & pending special stains to r/o infection    DLBCL  s/p R VATS with RUL wedge resection and mediastinal LN dissection on 11/10/22  pathology- heterogenous population of lymphocytes; not consistent w/ recurrence  hem/onc following    penicillin allergy  reports reaction- welts as a child  tolerated ceftriaxone last month  tolerating cefepime    Gio Tate M.D.  [facility], Division of Infectious Diseases  [phone]  After 5pm on weekdays and all day on weekends - please call [phone]

## 2022-11-20 NOTE — PROGRESS NOTE ADULT - ASSESSMENT
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), s/p LVATS, LUIS wedge resection (2021) direct admit for RVATS, RUL Nodule Biopsy w/ IR marking    #Tachycardia  -stable  -likely driven by overall medical issues, deconditioning  -recent echo w normal LVEF and mild-moderate MS  -s/p IVF  -cont bb for htn/tachy    #B cell Lymphoma s/p RVATS, RUL nodule biopsy  -followup path  -onc followup  -s/p thoracentesis 11/17      #H/o DVT/PE  -LE dopplers neg  -CTPA noted, no pe  -some suggestion of pulmonary edema, effusions  -clinically not overloaded  -diuretics prn for inc dyspnea/hypoxia   -off ivf     35 minutes spent on total encounter; more than 50% of the visit was spent counseling and/or coordinating care by the attending physician.

## 2022-11-20 NOTE — PROVIDER CONTACT NOTE (OTHER) - REASON
Pt. has a dry cough, trying to sleep. Pt. has a dry cough, unable to sleep. Pt. has a dry frequent cough.

## 2022-11-20 NOTE — PROGRESS NOTE ADULT - ASSESSMENT
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 Patient states that recently she has been feeling very fatigued. She states that moving around her house, or even to the bathroom, has been causing her to get very tired and causes her some minor chest pain, which resolves quickly with rest. She states that her breathing has been non-labored, denies dyspnea, shortness of breath, inability to catch her breath. She states that she has also not had much of an appetite over the last month and has been eating mostly soft foods. She admits to about a 5lb weight loss over the last month. She admits to a minor cough that occasionally occurs in fits and makes her feel as if she may vomit. Patient admits to some nausea and aversion to food but denies mechanical issues, inability to eat or feeling of choking. Denies hematemesis, hemoptysis.   (09 Nov 2022 12:31)      Hyponatremia  Likely SIADH  free water restriction <1L/day  Sodium worsening -- start salt tab again 1 gm TID x 2 days  monitor    Hypophosphatemia  supplement as needed  Check phosphorus daily.     Anemia  Defer to hematology    ANCA-associated vasculitis  Defer             hypocalcemia  sec to low alb  vit d 1,25 ok  Monitor

## 2022-11-20 NOTE — PROGRESS NOTE ADULT - SUBJECTIVE AND OBJECTIVE BOX
[facility]   MD RONEL FRIAS MD RUORU WONG, PA    TEL:  FROM 9 AM to 5 PM ---OFFICE: [phone]    FROM 5 PM - 9 AM PLEASE CALL ANSWERING SERVICE: [phone]    RENAL FOLLOW UP NOTE--Date of Service 11-20-22 @ 09:29  --------------------------------------------------------------------------------  HPI:      Pt seen and examined at bedside.   sitting up in chair having breakfast    PAST HISTORY  --------------------------------------------------------------------------------  No significant changes to PMH, PSH, FHx, SHx, unless otherwise noted    ALLERGIES & MEDICATIONS  --------------------------------------------------------------------------------  Allergies    codeine (Nausea)  doxycycline (Nausea)  penicillin (Hives)    Intolerances      Standing Inpatient Medications  acetaminophen   IVPB .. 750 milliGRAM(s) IV Intermittent once  cefepime   IVPB 2000 milliGRAM(s) IV Intermittent every 8 hours  citalopram 10 milliGRAM(s) Oral daily  fluticasone propionate 50 MICROgram(s)/spray Nasal Spray 1 Spray(s) Both Nostrils two times a day  folic acid 1 milliGRAM(s) Oral daily  heparin   Injectable 5000 Unit(s) SubCutaneous every 12 hours  lidocaine   4% Patch 1 Patch Transdermal daily  melatonin 3 milliGRAM(s) Oral at bedtime  metoprolol tartrate 25 milliGRAM(s) Oral two times a day  pantoprazole    Tablet 40 milliGRAM(s) Oral before breakfast  polyethylene glycol 3350 17 Gram(s) Oral daily  senna 1 Tablet(s) Oral daily    PRN Inpatient Medications  acetaminophen     Tablet .. 650 milliGRAM(s) Oral every 6 hours PRN  HYDROmorphone   Tablet 1 milliGRAM(s) Oral every 3 hours PRN  HYDROmorphone  Injectable 0.2 milliGRAM(s) IV Push every 3 hours PRN  naloxone Injectable 0.1 milliGRAM(s) IV Push every 3 minutes PRN  ondansetron Injectable 4 milliGRAM(s) IV Push every 6 hours PRN      REVIEW OF SYSTEMS  --------------------------------------------------------------------------------  General: no fever  MSK: no edema     VITALS/PHYSICAL EXAM  --------------------------------------------------------------------------------  T(C): 36.6 (11-20-22 @ 08:35), Max: 36.9 (11-19-22 @ 16:31)  HR: 78 (11-20-22 @ 08:35) (77 - 101)  BP: 131/54 (11-20-22 @ 08:35) (111/42 - 132/53)  RR: 18 (11-20-22 @ 08:35) (17 - 18)  SpO2: 96% (11-20-22 @ 08:35) (93% - 96%)  Wt(kg): --        11-19-22 @ 07:01  -  11-20-22 @ 07:00  --------------------------------------------------------  IN: 906 mL / OUT: 250 mL / NET: 656 mL      Physical Exam:  	Gen: NAD  	HEENT: MMM  	Pulm: CTA B/L  	CV: S1S2  	Abd: Soft, +BS  	Ext: No LE edema B/L                      Neuro: Awake   	Skin: Warm and Dry   	Vascular access: NO HD catheter             no lopez  LABS/STUDIES  --------------------------------------------------------------------------------              7.8    11.47 >-----------<  213      [11-20-22 @ 06:04]              25.1     133  |  104  |  15  ----------------------------<  115      [11-20-22 @ 06:04]  3.6   |  21  |  0.55        Ca     7.6     [11-20-22 @ 06:04]      Mg     1.90     [11-20-22 @ 06:04]      Phos  2.9     [11-20-22 @ 06:04]          Uric acid 2.8      [11-20-22 @ 06:04]        [11-20-22 @ 06:04]    Creatinine Trend:  SCr 0.55 [11-20 @ 06:04]  SCr 0.53 [11-19 @ 05:35]  SCr 0.60 [11-18 @ 05:15]  SCr 0.55 [11-17 @ 05:10]  SCr 0.60 [11-16 @ 13:04]    Urinalysis - [11-17-22 @ 20:04]      Color Judy / Appearance Clear / SG 1.044 / pH 6.5      Gluc Negative / Ketone Small  / Bili Small / Urobili >12 mg/dL       Blood Small / Protein 100 mg/dL / Leuk Est Negative / Nitrite Negative      RBC 12 / WBC 11 / Hyaline 9 / Gran  / Sq Epi  / Non Sq Epi 11 / Bacteria Few    Urine Creatinine 126      [11-17-22 @ 20:04]  Urine Protein 126      [11-17-22 @ 20:04]  Urine Sodium 119      [11-17-22 @ 20:04]    Iron 97, TIBC <127, %sat --      [11-10-22 @ 06:55]  Ferritin 986      [11-10-22 @ 06:55]  Vitamin D (25OH) 38.3      [11-15-22 @ 06:47]  Lipid: chol 84, TG 81, HDL 22, LDL --      [10-01-22 @ 07:10]    HBsAb Nonreact      [11-11-22 @ 04:15]  HBsAg Nonreact      [11-11-22 @ 04:15]  HBcAb Nonreact      [11-11-22 @ 04:15]  HCV 0.10, Nonreact      [11-15-22 @ 06:47]    ANCA: cANCA Negative, pANCA Negative, atypical ANCA Indeterminate Method interference due to CATHERINE Fluorescence      [11-10-22 @ 06:55]  MPO-ANCA <5.0, interpretation: Negative      [11-10-22 @ 06:55]  PR3-ANCA <5.0, interpretation: Negative      [11-10-22 @ 06:55]

## 2022-11-20 NOTE — PROGRESS NOTE ADULT - SUBJECTIVE AND OBJECTIVE BOX
[facility], Division of Infectious Diseases  ANDREW Rodríguez Y. Patel, S. Shah, G. Bebeto  [phone]  ([phone] - weekdays after 5pm and weekends)    Name: BETTIE PRATER  Age/Gender: 81y Female  MRN: [id number]    Interval History:  Patient seen this morning.   Notes reviewed.   No concerning overnight events.  Afebrile.   denies SOB, dysuria  states she feels fine this AM    Allergies: codeine (Nausea)  doxycycline (Nausea)  penicillin (Hives)      Objective:  Vitals:   T(F): 97.9 (11-20-22 @ 08:35), Max: 98.5 (11-19-22 @ 16:31)  HR: 78 (11-20-22 @ 08:35) (77 - 101)  BP: 131/54 (11-20-22 @ 08:35) (111/42 - 132/53)  RR: 18 (11-20-22 @ 08:35) (17 - 18)  SpO2: 96% (11-20-22 @ 08:35) (93% - 96%)  Physical Examination:  General: no acute distress  HEENT: anicteric  Cardio: S1, S2, normal rate, L chest mediport, no erythema  Resp: clear to auscultation anteriorly  Abd: soft, NT, ND  Ext: no LE edema  Skin: warm, dry    Laboratory Studies:  CBC:                       7.8    11.47 )-----------( 213      ( 20 Nov 2022 06:04 )             25.1     WBC Trend:  11.47 11-20-22 @ 06:04  12.83 11-19-22 @ 05:35  18.10 11-18-22 @ 05:15  12.35 11-17-22 @ 05:10  7.52 11-16-22 @ 06:39  7.71 11-15-22 @ 06:47  8.07 11-14-22 @ 22:00  6.99 11-14-22 @ 06:04    CMP: 11-20    133<L>  |  104  |  15  ----------------------------<  115<H>  3.6   |  21<L>  |  0.55    Ca    7.6<L>      20 Nov 2022 06:04  Phos  2.9     11-20  Mg     1.90     11-20              Microbiology: reviewed     Culture - Blood (collected 11-18-22 @ 09:45)  Source: .Blood Blood-Peripheral  Preliminary Report (11-19-22 @ 13:02):    No growth to date.    Culture - Blood (collected 11-18-22 @ 06:00)  Source: .Blood Blood-Peripheral  Preliminary Report (11-19-22 @ 13:02):    No growth to date.    Culture - Body Fluid with Gram Stain (collected 11-17-22 @ 16:04)  Source: Pleural Fl Pleural Fluid  Gram Stain (11-17-22 @ 22:06):    polymorphonuclear leukocytes seen    No organisms seen    by cytocentrifuge  Preliminary Report (11-18-22 @ 16:58):    No growth    Culture - Blood (collected 11-14-22 @ 23:17)  Source: .Blood Blood-Peripheral  Final Report (11-20-22 @ 07:00):    No Growth Final    Culture - Blood (collected 11-14-22 @ 21:40)  Source: .Blood Blood-Peripheral  Final Report (11-20-22 @ 04:00):    No Growth Final        Radiology: reviewed     Medications:  acetaminophen     Tablet .. 650 milliGRAM(s) Oral every 6 hours PRN  acetaminophen   IVPB .. 750 milliGRAM(s) IV Intermittent once  cefepime   IVPB 2000 milliGRAM(s) IV Intermittent every 8 hours  citalopram 10 milliGRAM(s) Oral daily  fluticasone propionate 50 MICROgram(s)/spray Nasal Spray 1 Spray(s) Both Nostrils two times a day  folic acid 1 milliGRAM(s) Oral daily  heparin   Injectable 5000 Unit(s) SubCutaneous every 12 hours  HYDROmorphone   Tablet 1 milliGRAM(s) Oral every 3 hours PRN  HYDROmorphone  Injectable 0.2 milliGRAM(s) IV Push every 3 hours PRN  lidocaine   4% Patch 1 Patch Transdermal daily  melatonin 3 milliGRAM(s) Oral at bedtime  metoprolol tartrate 25 milliGRAM(s) Oral two times a day  naloxone Injectable 0.1 milliGRAM(s) IV Push every 3 minutes PRN  ondansetron Injectable 4 milliGRAM(s) IV Push every 6 hours PRN  pantoprazole    Tablet 40 milliGRAM(s) Oral before breakfast  polyethylene glycol 3350 17 Gram(s) Oral daily  senna 1 Tablet(s) Oral daily  sodium chloride 1 Gram(s) Oral three times a day    Antimicrobials:  cefepime   IVPB 2000 milliGRAM(s) IV Intermittent every 8 hours

## 2022-11-20 NOTE — PROGRESS NOTE ADULT - SUBJECTIVE AND OBJECTIVE BOX
CARDIOLOGY FOLLOW UP - Dr. Hooker  Date of Service: 11/20/22  CC: no events    Review of Systems:  Constitutional: No fever, weight loss, or fatigue  Respiratory: No cough, wheezing, or hemoptysis, no shortness of breath  Cardiovascular: No chest pain, palpitations, passing out, dizziness, or leg swelling  Gastrointestinal: No abd or epigastric pain. No nausea, vomiting, or hematemesis; no diarrhea or consiptaiton, no melena or hematochezia  Vascular: No edema     TELEMETRY:    PHYSICAL EXAM:  T(C): 36.6 (11-20-22 @ 08:35), Max: 36.9 (11-19-22 @ 16:31)  HR: 78 (11-20-22 @ 08:35) (77 - 101)  BP: 131/54 (11-20-22 @ 08:35) (111/42 - 132/53)  RR: 18 (11-20-22 @ 08:35) (17 - 18)  SpO2: 96% (11-20-22 @ 08:35) (93% - 96%)  Wt(kg): --  I&O's Summary    19 Nov 2022 07:01  -  20 Nov 2022 07:00  --------------------------------------------------------  IN: 906 mL / OUT: 250 mL / NET: 656 mL        Appearance: Normal	  Cardiovascular: Normal S1 S2,RRR, No JVD, No murmurs  Respiratory: Lungs clear to auscultation	  Gastrointestinal:  Soft, Non-tender, + BS	  Extremities: Normal range of motion, No clubbing, cyanosis or edema  Vascular: Peripheral pulses palpable 2+ bilaterally       Home Medications:  citalopram 10 mg oral tablet: 1 tab(s) orally once a day (04 Nov 2022 15:16)  ezetimibe 10 mg oral tablet: 1 tab(s) orally once a day (04 Nov 2022 15:16)  folic acid 1 mg oral tablet: 1 tab(s) orally once a day (04 Nov 2022 15:16)  omeprazole 20 mg oral delayed release capsule: 1 cap(s) orally prn (04 Nov 2022 15:16)  Sodium Chloride 1 g oral tablet: daily (04 Nov 2022 15:16)        MEDICATIONS  (STANDING):  acetaminophen   IVPB .. 750 milliGRAM(s) IV Intermittent once  cefepime   IVPB 2000 milliGRAM(s) IV Intermittent every 8 hours  citalopram 10 milliGRAM(s) Oral daily  fluticasone propionate 50 MICROgram(s)/spray Nasal Spray 1 Spray(s) Both Nostrils two times a day  folic acid 1 milliGRAM(s) Oral daily  heparin   Injectable 5000 Unit(s) SubCutaneous every 12 hours  lidocaine   4% Patch 1 Patch Transdermal daily  melatonin 3 milliGRAM(s) Oral at bedtime  metoprolol tartrate 25 milliGRAM(s) Oral two times a day  pantoprazole    Tablet 40 milliGRAM(s) Oral before breakfast  polyethylene glycol 3350 17 Gram(s) Oral daily  senna 1 Tablet(s) Oral daily        EKG:  RADIOLOGY:  DIAGNOSTIC TESTING:  [ ] Echocardiogram:  [ ] Catherterization:  [ ] Stress Test:  OTHER:     LABS:	 	                          7.8    11.47 )-----------( 213      ( 20 Nov 2022 06:04 )             25.1     11-20    133<L>  |  104  |  15  ----------------------------<  115<H>  3.6   |  21<L>  |  0.55    Ca    7.6<L>      20 Nov 2022 06:04  Phos  2.9     11-20  Mg     1.90     11-20            CARDIAC MARKERS:

## 2022-11-20 NOTE — PROGRESS NOTE ADULT - ASSESSMENT
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 Patient is admitted to be optimized for her surgical procedure.    Plan: OR tomorrow for RVATS, RUL Nodule Biopsy w/ IR Marking.    1: hx of lymphoma: DBLCL  2: Pleural effusion :  3: HLD:   '4: ANCA ASSOCIATED VASCULITIS      11/17:    1: hx of lymphoma: DBLCL: S/P SURGERY RUL wedge resection and LN biopsy : await results:   2: Pleural effusion : not much of chest xray  : but ct scan chest revelas more pleurl effusion on rt sdie then left:  send for chem and cultures   3: HLD: : per PMD  '4: ANCA ASSOCIATED VASCULITIS : sHE HAD FEVER:  RHEUMATOLOGY FOLLOWING ON ANTIBIOTICS:    DW THORACIC     11/18:    1: hx of lymphoma: DBLCL: S/P SURGERY RUL wedge resection and LN biopsy : await results:   2: Pleural effusion : not much of chest xray  : but ct scan chest reveals more pleural effusion on rt side then left: s/p thoracentesis : sent for cultures:  has exudative effusion ? malignancy ? lymphoma  3: HLD: : per PMD  '4: ANCA ASSOCIATED VASCULITIS : ssHE HAD FEVER:  RHEUMATOLOGY FOLLOWING ON ANTIBIOTICS: AFEBRILE IN LAST 24 HOURS AND IS ON CEFEPIME:     DW THORACIC AND ACP     11/20:      1: hx of lymphoma: DBLCL: S/P SURGERY RUL wedge resection and LN biopsy : await results:   2: Pleural effusion : not much of chest xray  : but ct scan chest reveals more pleural effusion on rt side then left: s/p thoracentesis : sent for cultures:  has exudative effusion ? malignancy ? lymphoma : await flow cyto   3: HLD: : per PMD  '4: ANCA ASSOCIATED VASCULITIS : ssHE HAD FEVER:  RHEUMATOLOGY FOLLOWING ON ANTIBIOTICS: AFEBRILE IN LAST 72 HOURS AND IS ON CEFEPIME:     DW  ACP

## 2022-11-20 NOTE — PROGRESS NOTE ADULT - ASSESSMENT
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 Patient states that recently she has been feeling very fatigued.    Fatigue/dyspnea, with hx of Lymphoma  - Recent TTE on 11/3/22 reviewed: normal LV. outpt card Dr. Ady Cooper  - PT also saw pulm Mack Tucker in the office, with some concern for her overall status. She was hypotensive to systolic 90s in the office.  (now BP improves s/p IVF here).   - s/p thoracoscopic biopsy of mediastinal lymph node and Lung wedge resection 11/10/22  - path results favoring vasculitis, but final stains to r/o infection are still pending; no evidence for lymphoma  - appreciate rheum, heme-onc    Fever either 2' ANCA-mediated vasculitis vs infection  - UA, CXR unimpressive. RVP neg.   - no leukocytosis, remain stable clinically  - procal low.  - LE venous dopplers (-) for DVT  - s/p rt thoracentesis 11/17/22  - cefepime started 11/18/22 following worsened leukocytosis 11/18/22   - blood cxs 11/18/22 --> (-)  - card consulted (Dr. Hooker) appreciated  - ID appreciated    Rt pleural effusion  - CTA chest 11/16/22 revealed moderate rt effusion  - s/p 650 cc U/S-guided rt thoracentesis 11/17/22  - exudative but no neutrophilic predominance and initial Gram stain w/o organisms  - f/u final cultures  - pulmonology consult appreciated    Tachycardia likely 2' fever  - cont low-dose metoprolol    Anemia  - hgb 8.1 lower than her baseline.  - no melena, no hematochezia. no BRBPR.   - recent Anemia work-up reviewed: normal vit b12, folate.  - repeat iron studies ordered    - s/p 2 units PRBC 11/1/22 per heme's note.   - given 1 unit prbc 11/9/22 with appropriate post transfusion hgb.    - no melena, no hematochezia. no BRBPR.   - will continue to monitor, repeat hgb stable     Anxiety and depression  - stable, continue citalopram.  - Pt denied SI/HI ideations, denied visual and auditory hallucinations.     GERD (gastroesophageal reflux disease)  - continue PPI    Hyperlipidemia.   - continue home ezetimibe. okay to hold if nonformulary   - Lipid panel    DVT ppx   - SC heparin    Disposition  - PT: rehab. Pt now agreeable to rehab

## 2022-11-20 NOTE — PROGRESS NOTE ADULT - SUBJECTIVE AND OBJECTIVE BOX
WBC continues to downtrend  No acute changes in her breathing    Vital Signs Last 24 Hrs  T(C): 36.6 (20 Nov 2022 08:35), Max: 36.9 (19 Nov 2022 16:31)  T(F): 97.9 (20 Nov 2022 08:35), Max: 98.5 (19 Nov 2022 16:31)  HR: 78 (20 Nov 2022 08:35) (77 - 101)  BP: 131/54 (20 Nov 2022 08:35) (111/42 - 132/53)  BP(mean): --  RR: 18 (20 Nov 2022 08:35) (17 - 18)  SpO2: 96% (20 Nov 2022 08:35) (93% - 96%)    I&O's Summary    11-19-22 @ 07:01  -  11-20-22 @ 07:00  --------------------------------------------------------  IN: 906 mL / OUT: 250 mL / NET: 656 mL          PHYSICAL EXAM:  GENERAL: NAD, thin-elderly, comfortable on room air  HEAD:  Atraumatic, Normocephalic  EYES: EOMI, PERRLA, conjunctiva and sclera clear  NECK: Supple, No JVD  CHEST/LUNG: mild decrease breath sounds bilaterally; No wheeze   HEART: Regular rate and rhythm; No murmurs, rubs, or gallops  ABDOMEN: Soft, Nontender, Nondistended; Bowel sounds present  Neuro: AAOx3, no focal weakness   EXTREMITIES:  2+ Peripheral Pulses, No clubbing, cyanosis, or edema      LABS:                        7.8    11.47 )-----------( 213      ( 20 Nov 2022 06:04 )             25.1     11-20    133<L>  |  104  |  15  ----------------------------<  115<H>  3.6   |  21<L>  |  0.55    Ca    7.6<L>      20 Nov 2022 06:04  Phos  2.9     11-20  Mg     1.90     11-20        CAPILLARY BLOOD GLUCOSE                RADIOLOGY & ADDITIONAL TESTS:    Imaging Personally Reviewed:  [x] YES  [ ] NO    Will obtain old records:  [ ] YES  [x] NO

## 2022-11-21 NOTE — CHART NOTE - NSCHARTNOTEFT_GEN_A_CORE
NUTRITION FOLLOW-UP    Pt seen for Nutrition consult for assessment and education.    Pt has a history of diffuse large B cell lymphoma in remission, non-hodgkin's lymphoma, depression, HLD, GERD, PE/DVT, ANCA-vasculitis, s/p LVATS, LUIS wedge resection. Pt is s/p RVATS, RUL Nodule Biopsy w/ IR marking.     At time of last visit, Pt was eating more than 50% of her meals. Pt is now eating very little. Pt's son at bedside reported that Pt will eat a few spoonfuls of soup and stop. she has not been eating solid foods. Discussed food choice and will provide. Pt reports drinking Ensure supplement but was unable to quantify amount. Encouraged Pt to increase po intake do meals and supplement.    Pt's weight on 11/4 was 58.1 kg. Her weight on 11/18 was 54.8 kg. Pt has had a 7 lb/5.46% weight loss in 2 weeks.   Pt is at risk for severe malnutrition.    Weight: 54.8 kg  11/18 /22    Labs: 11-21 Na 135 mmol/L Glu 95 mg/dL K+ 3.4 mmol/L<L> Cr 0.62 mg/dL BUN 18 mg/dL Phos 2.1 mg/dL<L>      Current Diet: Diet, Regular:   1500mL Fluid Restriction ([id number])  Supplement Feeding Modality:  Oral  Ensure Enlive Cans or Servings Per Day:  1       Frequency:  Two Times a day (11-20-22 @ 18:01)    Edema: No edema noted    Skin" Surgical incision right chest RVATS    Nutrition diagnosis: Severe malnutrition                                 Related to Pt meeting criteria for severe malnutrition in context of acute illness                                Evidenced by 5.46% weight loss in 2 weeks and eating <50% of nutrient needs for >5 days    RD to Remain Available: Amanda Milton MS, RDN, CDN pager [phone]     Additional Recommendations:   1) Provide food preferences as able  2) Encourage po intake  3) Monitor weight, labs, po intake and skin integrity.

## 2022-11-21 NOTE — PROGRESS NOTE ADULT - SUBJECTIVE AND OBJECTIVE BOX
No acute SOB  Appetite remains suboptimal  No overt bleeding    Vital Signs Last 24 Hrs  T(C): 36.7 (21 Nov 2022 08:37), Max: 37.4 (21 Nov 2022 05:05)  T(F): 98 (21 Nov 2022 08:37), Max: 99.4 (21 Nov 2022 05:05)  HR: 84 (21 Nov 2022 08:37) (82 - 108)  BP: 124/45 (21 Nov 2022 08:37) (107/40 - 135/52)  BP(mean): --  RR: 19 (21 Nov 2022 08:37) (18 - 19)  SpO2: 97% (21 Nov 2022 08:37) (95% - 100%)    I&O's Summary    11-20-22 @ 07:01  -  11-21-22 @ 07:00  --------------------------------------------------------  IN: 0 mL / OUT: 0 mL / NET: 0 mL    11-21-22 @ 07:01  -  11-21-22 @ 10:08  --------------------------------------------------------  IN: 540 mL / OUT: 300 mL / NET: 240 mL        PHYSICAL EXAM:  GENERAL: NAD, thin-elderly, comfortable on room air  HEAD:  Atraumatic, Normocephalic  EYES: EOMI, PERRLA, conjunctiva and sclera clear  NECK: Supple, No JVD  CHEST/LUNG: mild decrease breath sounds bilaterally; No wheeze   HEART: Regular rate and rhythm; No murmurs, rubs, or gallops  ABDOMEN: Soft, Nontender, Nondistended; Bowel sounds present  Neuro: AAOx3, no focal weakness   EXTREMITIES:  2+ Peripheral Pulses, No clubbing, cyanosis, or edema    LABS:                        6.7    12.82 )-----------( 278      ( 21 Nov 2022 04:43 )             21.6     11-21    135  |  103  |  18  ----------------------------<  95  3.4<L>   |  18<L>  |  0.62    Ca    7.8<L>      21 Nov 2022 04:43  Phos  2.1     11-21  Mg     1.80     11-21        CAPILLARY BLOOD GLUCOSE                RADIOLOGY & ADDITIONAL TESTS:    Imaging Personally Reviewed:  [x] YES  [ ] NO    Will obtain old records:  [ ] YES  [x] NO

## 2022-11-21 NOTE — PROGRESS NOTE ADULT - SUBJECTIVE AND OBJECTIVE BOX
[facility], Division of Infectious Diseases  ANDREW Rodríguez Y. Patel, S. Shah, G. Bebeto  [phone]  ([phone] - weekdays after 5pm and weekends)    Name: BETTIE PRATER  Age/Gender: 81y Female  MRN: [id number]    Interval History:  Patient seen this morning.   Notes reviewed.   No concerning overnight events.  Afebrile.   reports she is doing ok this AM  denies diarrhea, SOB  receiving transfusion this AM    Allergies: codeine (Nausea)  doxycycline (Nausea)  penicillin (Hives)      Objective:  Vitals:   T(F): 98 (11-21-22 @ 08:37), Max: 99.4 (11-21-22 @ 05:05)  HR: 84 (11-21-22 @ 08:37) (82 - 108)  BP: 124/45 (11-21-22 @ 08:37) (107/40 - 135/52)  RR: 19 (11-21-22 @ 08:37) (18 - 19)  SpO2: 97% (11-21-22 @ 08:37) (95% - 100%)  Physical Examination:  General: no acute distress  HEENT: anicteric  Cardio: normal rate, L chest mediport, no erythema  Resp: clear to auscultation anteriorly  Abd: soft, NT, ND  Ext: no LE edema  Skin: warm, dry    Laboratory Studies:  CBC:                       6.7    12.82 )-----------( 278      ( 21 Nov 2022 04:43 )             21.6     WBC Trend:  12.82 11-21-22 @ 04:43  11.47 11-20-22 @ 06:04  12.83 11-19-22 @ 05:35  18.10 11-18-22 @ 05:15  12.35 11-17-22 @ 05:10  7.52 11-16-22 @ 06:39  7.71 11-15-22 @ 06:47  8.07 11-14-22 @ 22:00    CMP: 11-21    135  |  103  |  18  ----------------------------<  95  3.4<L>   |  18<L>  |  0.62    Ca    7.8<L>      21 Nov 2022 04:43  Phos  2.1     11-21  Mg     1.80     11-21              Microbiology: reviewed     Culture - Blood (collected 11-18-22 @ 09:45)  Source: .Blood Blood-Peripheral  Preliminary Report (11-19-22 @ 13:02):    No growth to date.    Culture - Blood (collected 11-18-22 @ 06:00)  Source: .Blood Blood-Peripheral  Preliminary Report (11-19-22 @ 13:02):    No growth to date.    Culture - Body Fluid with Gram Stain (collected 11-17-22 @ 16:04)  Source: Pleural Fl Pleural Fluid  Gram Stain (11-17-22 @ 22:06):    polymorphonuclear leukocytes seen    No organisms seen    by cytocentrifuge  Preliminary Report (11-18-22 @ 16:58):    No growth    Culture - Blood (collected 11-14-22 @ 23:17)  Source: .Blood Blood-Peripheral  Final Report (11-20-22 @ 07:00):    No Growth Final    Culture - Blood (collected 11-14-22 @ 21:40)  Source: .Blood Blood-Peripheral  Final Report (11-20-22 @ 04:00):    No Growth Final        Radiology: reviewed     Medications:  acetaminophen     Tablet .. 650 milliGRAM(s) Oral every 6 hours PRN  acetaminophen   IVPB .. 750 milliGRAM(s) IV Intermittent once  benzonatate 200 milliGRAM(s) Oral three times a day PRN  cefepime   IVPB 2000 milliGRAM(s) IV Intermittent every 8 hours  citalopram 10 milliGRAM(s) Oral daily  fluticasone propionate 50 MICROgram(s)/spray Nasal Spray 1 Spray(s) Both Nostrils two times a day  folic acid 1 milliGRAM(s) Oral daily  guaiFENesin Oral Liquid (Sugar-Free) 200 milliGRAM(s) Oral every 6 hours PRN  heparin   Injectable 5000 Unit(s) SubCutaneous every 12 hours  HYDROmorphone   Tablet 1 milliGRAM(s) Oral every 3 hours PRN  HYDROmorphone  Injectable 0.2 milliGRAM(s) IV Push every 3 hours PRN  lidocaine   4% Patch 1 Patch Transdermal daily  melatonin 3 milliGRAM(s) Oral at bedtime  metoprolol tartrate 25 milliGRAM(s) Oral two times a day  naloxone Injectable 0.1 milliGRAM(s) IV Push every 3 minutes PRN  ondansetron Injectable 4 milliGRAM(s) IV Push every 6 hours PRN  pantoprazole    Tablet 40 milliGRAM(s) Oral before breakfast  polyethylene glycol 3350 17 Gram(s) Oral daily  senna 1 Tablet(s) Oral daily  sodium chloride 1 Gram(s) Oral three times a day    Antimicrobials:  cefepime   IVPB 2000 milliGRAM(s) IV Intermittent every 8 hours   [facility], Division of Infectious Diseases  ANDREW Rodríguez Y. Patel, S. Shah, G. Bebeto  [phone]  ([phone] - weekdays after 5pm and weekends)    Name: BETTIE PRATER  Age/Gender: 81y Female  MRN: [id number]    Interval History:  Patient seen this morning.   Notes reviewed.   No concerning overnight events.  Afebrile.   reports she is doing ok this AM  denies diarrhea  receiving transfusion this AM    Allergies: codeine (Nausea)  doxycycline (Nausea)  penicillin (Hives)      Objective:  Vitals:   T(F): 98 (11-21-22 @ 08:37), Max: 99.4 (11-21-22 @ 05:05)  HR: 84 (11-21-22 @ 08:37) (82 - 108)  BP: 124/45 (11-21-22 @ 08:37) (107/40 - 135/52)  RR: 19 (11-21-22 @ 08:37) (18 - 19)  SpO2: 97% (11-21-22 @ 08:37) (95% - 100%)  Physical Examination:  General: no acute distress  HEENT: anicteric  Cardio: normal rate, L chest mediport, no erythema  Resp: clear to auscultation anteriorly  Abd: soft, NT, ND  Ext: no LE edema  Skin: warm, dry    Laboratory Studies:  CBC:                       6.7    12.82 )-----------( 278      ( 21 Nov 2022 04:43 )             21.6     WBC Trend:  12.82 11-21-22 @ 04:43  11.47 11-20-22 @ 06:04  12.83 11-19-22 @ 05:35  18.10 11-18-22 @ 05:15  12.35 11-17-22 @ 05:10  7.52 11-16-22 @ 06:39  7.71 11-15-22 @ 06:47  8.07 11-14-22 @ 22:00    CMP: 11-21    135  |  103  |  18  ----------------------------<  95  3.4<L>   |  18<L>  |  0.62    Ca    7.8<L>      21 Nov 2022 04:43  Phos  2.1     11-21  Mg     1.80     11-21              Microbiology: reviewed     Culture - Blood (collected 11-18-22 @ 09:45)  Source: .Blood Blood-Peripheral  Preliminary Report (11-19-22 @ 13:02):    No growth to date.    Culture - Blood (collected 11-18-22 @ 06:00)  Source: .Blood Blood-Peripheral  Preliminary Report (11-19-22 @ 13:02):    No growth to date.    Culture - Body Fluid with Gram Stain (collected 11-17-22 @ 16:04)  Source: Pleural Fl Pleural Fluid  Gram Stain (11-17-22 @ 22:06):    polymorphonuclear leukocytes seen    No organisms seen    by cytocentrifuge  Preliminary Report (11-18-22 @ 16:58):    No growth    Culture - Blood (collected 11-14-22 @ 23:17)  Source: .Blood Blood-Peripheral  Final Report (11-20-22 @ 07:00):    No Growth Final    Culture - Blood (collected 11-14-22 @ 21:40)  Source: .Blood Blood-Peripheral  Final Report (11-20-22 @ 04:00):    No Growth Final        Radiology: reviewed     Medications:  acetaminophen     Tablet .. 650 milliGRAM(s) Oral every 6 hours PRN  acetaminophen   IVPB .. 750 milliGRAM(s) IV Intermittent once  benzonatate 200 milliGRAM(s) Oral three times a day PRN  cefepime   IVPB 2000 milliGRAM(s) IV Intermittent every 8 hours  citalopram 10 milliGRAM(s) Oral daily  fluticasone propionate 50 MICROgram(s)/spray Nasal Spray 1 Spray(s) Both Nostrils two times a day  folic acid 1 milliGRAM(s) Oral daily  guaiFENesin Oral Liquid (Sugar-Free) 200 milliGRAM(s) Oral every 6 hours PRN  heparin   Injectable 5000 Unit(s) SubCutaneous every 12 hours  HYDROmorphone   Tablet 1 milliGRAM(s) Oral every 3 hours PRN  HYDROmorphone  Injectable 0.2 milliGRAM(s) IV Push every 3 hours PRN  lidocaine   4% Patch 1 Patch Transdermal daily  melatonin 3 milliGRAM(s) Oral at bedtime  metoprolol tartrate 25 milliGRAM(s) Oral two times a day  naloxone Injectable 0.1 milliGRAM(s) IV Push every 3 minutes PRN  ondansetron Injectable 4 milliGRAM(s) IV Push every 6 hours PRN  pantoprazole    Tablet 40 milliGRAM(s) Oral before breakfast  polyethylene glycol 3350 17 Gram(s) Oral daily  senna 1 Tablet(s) Oral daily  sodium chloride 1 Gram(s) Oral three times a day    Antimicrobials:  cefepime   IVPB 2000 milliGRAM(s) IV Intermittent every 8 hours

## 2022-11-21 NOTE — PROVIDER CONTACT NOTE (CRITICAL VALUE NOTIFICATION) - BACKGROUND
Pt. admitted for RVATS. Tachycardic, failure to thrive, weakness. Pt. admitted for RVATS. MHX Tachycardic, failure to thrive, weakness.

## 2022-11-21 NOTE — PROGRESS NOTE ADULT - ASSESSMENT
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 Small right pneumothorax.  s/p thoracentesis 11/17, exudative effusion, cell count not consistent w/ empyema, f/u cultures- no growth  f/u blood cultures- NGTD  thus far all cultures   c/w cefepime 2g every 8 hours for now  trend wbc/ temps  pathology special stains negative for microorganisms    DLBCL  s/p R VATS with RUL wedge resection and mediastinal LN dissection on 11/10/22  pathology- heterogenous population of lymphocytes; not consistent w/ recurrence  pathology c/f vasculitis vs infection  hem/onc following    anemia  receiving transfusion today    penicillin allergy  reports reaction- welts as a child  tolerated ceftriaxone last month  tolerating cefepime    Gio Tate M.D.  [facility], Division of Infectious Diseases  [phone]  After 5pm on weekdays and all day on weekends - please call [phone]  80 y/o F PMhx diffuse large B cell lymphoma s/p R-CHOP, depression, GERD, PE/DVT (4/2021 no longer on Eliquis), ANCA-vasculitis (22 y/a, not on therapy), s/p LVATS, LUIS wedge resection (2021) who presented as for RVATS, RUL Nodule Biopsy    fever  febrile to 101.5 on 11/14, afebrile since  no evidence of SSTI on exam, no erythema or tenderness surrounding mediport  no localizing signs of symptoms  RVP negative and SARS-CoV-2 PCR neg  quant gold negative  US LE neg for DVT  CTA chest- Moderate right and small left pleural effusions with some interlobular septal thickening suggestive of pulmonary edema. Small right pneumothorax.  s/p thoracentesis 11/17, exudative effusion, cell count not consistent w/ empyema, f/u cultures- no growth  f/u blood cultures- NGTD  thus far all cultures   f/u CXR, RVP  c/w cefepime 2g every 8 hours for now pending CXR  trend wbc/ temps  pathology special stains negative for microorganisms  rheumatology f/u    DLBCL  s/p R VATS with RUL wedge resection and mediastinal LN dissection on 11/10/22  pathology- heterogenous population of lymphocytes; not consistent w/ recurrence  pathology c/f vasculitis vs infection  hem/onc following    anemia  receiving transfusion today    penicillin allergy  reports reaction- welts as a child  tolerated ceftriaxone last month  tolerating cefepime    Gio Tate M.D.  [facility], Division of Infectious Diseases  [phone]  After 5pm on weekdays and all day on weekends - please call [phone]  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), s/p LVATS, LUIS wedge resection (2021) who presented as for RVATS, RUL Nodule Biopsy    COVID  SARS-CoV-2 PCR positive  patient w/ cough and risk factors for progression to severe disease given age, lymphoma  start remdesivir x 3 day course  monitor liver enzymes while on remdesivir  trend inflammatory markers  monitor SpO2  supportive care  isolation per infection control policy   discussed plan w/ patient's son    fever  febrile to 101.5 on 11/14, afebrile since  no evidence of SSTI on exam, no erythema or tenderness surrounding mediport  no localizing signs of symptoms  RVP negative and SARS-CoV-2 PCR neg  quant gold negative  US LE neg for DVT  procal <.5  CTA chest- Moderate right and small left pleural effusions with some interlobular septal thickening suggestive of pulmonary edema. Small right pneumothorax.  s/p thoracentesis 11/17, exudative effusion, cell count not consistent w/ empyema, f/u cultures- no growth  f/u blood cultures- NGTD  thus far all cultures   Pending CXR results will plan to discontinue cefepime given all infectious work-up negative except for COVID  trend wbc/ temps  pathology special stains negative for microorganisms  rheumatology f/u for vasculitis    DLBCL  s/p R VATS with RUL wedge resection and mediastinal LN dissection on 11/10/22  pathology- heterogenous population of lymphocytes; not consistent w/ recurrence  pathology c/f vasculitis vs infection  hem/onc following    anemia  receiving transfusion today    penicillin allergy  reports reaction- welts as a child  tolerated ceftriaxone last month  tolerating cefepime    Gio Tate M.D.  [facility], Division of Infectious Diseases  [phone]  After 5pm on weekdays and all day on weekends - please call [phone]

## 2022-11-21 NOTE — PROGRESS NOTE ADULT - SUBJECTIVE AND OBJECTIVE BOX
CARDIOLOGY FOLLOW UP NOTE - DR. CRUZ    Patient Name: BETTIE PRATER  Date of Service: 11-21-22 @ 11:56    no new events    Subjective:    cv: denies chest pain, dyspnea, palpitations, dizziness  pulmonary: denies cough  GI: denies abdominal pain, nausea, vomiting  vascular/legs: no edema   skin: no rash  ROS: otherwise negative   overnight events:      PHYSICAL EXAM:  T(C): 36.7 (11-21-22 @ 08:37), Max: 37.4 (11-21-22 @ 05:05)  HR: 84 (11-21-22 @ 08:37) (82 - 108)  BP: 124/45 (11-21-22 @ 08:37) (107/40 - 135/52)  RR: 19 (11-21-22 @ 08:37) (18 - 19)  SpO2: 97% (11-21-22 @ 08:37) (95% - 100%)  Wt(kg): --  I&O's Summary    20 Nov 2022 07:01  -  21 Nov 2022 07:00  --------------------------------------------------------  IN: 0 mL / OUT: 0 mL / NET: 0 mL    21 Nov 2022 07:01  -  21 Nov 2022 11:56  --------------------------------------------------------  IN: 540 mL / OUT: 300 mL / NET: 240 mL      Daily     Daily     Appearance: Normal	  Cardiovascular: Normal S1 S2,RRR, No JVD, No murmurs  Respiratory: Lungs clear to auscultation	  Gastrointestinal:  Soft, Non-tender, + BS	  Extremities: Normal range of motion, No clubbing, cyanosis or edema      Home Medications:  citalopram 10 mg oral tablet: 1 tab(s) orally once a day (04 Nov 2022 15:16)  ezetimibe 10 mg oral tablet: 1 tab(s) orally once a day (04 Nov 2022 15:16)  folic acid 1 mg oral tablet: 1 tab(s) orally once a day (04 Nov 2022 15:16)  omeprazole 20 mg oral delayed release capsule: 1 cap(s) orally prn (04 Nov 2022 15:16)  Sodium Chloride 1 g oral tablet: daily (04 Nov 2022 15:16)      MEDICATIONS  (STANDING):  acetaminophen   IVPB .. 750 milliGRAM(s) IV Intermittent once  cefepime   IVPB 2000 milliGRAM(s) IV Intermittent every 8 hours  citalopram 10 milliGRAM(s) Oral daily  fluticasone propionate 50 MICROgram(s)/spray Nasal Spray 1 Spray(s) Both Nostrils two times a day  folic acid 1 milliGRAM(s) Oral daily  heparin   Injectable 5000 Unit(s) SubCutaneous every 12 hours  lidocaine   4% Patch 1 Patch Transdermal daily  melatonin 3 milliGRAM(s) Oral at bedtime  metoprolol tartrate 25 milliGRAM(s) Oral two times a day  pantoprazole    Tablet 40 milliGRAM(s) Oral before breakfast  polyethylene glycol 3350 17 Gram(s) Oral daily  senna 1 Tablet(s) Oral daily  sodium chloride 1 Gram(s) Oral three times a day      TELEMETRY: 	    ECG:  	  RADIOLOGY:   DIAGNOSTIC TESTING:  [ ] Echocardiogram:  [ ] Catheterization:  [ ] Stress Test:    OTHER: 	    LABS:	 	    CARDIAC MARKERS:                                      6.7    12.82 )-----------( 278      ( 21 Nov 2022 04:43 )             21.6     11-21    135  |  103  |  18  ----------------------------<  95  3.4<L>   |  18<L>  |  0.62    Ca    7.8<L>      21 Nov 2022 04:43  Phos  2.1     11-21  Mg     1.80     11-21      proBNP:     Lipid Profile:   HgA1c:     Creatinine, Serum: 0.62 mg/dL (11-21-22 @ 04:43)  Creatinine, Serum: 0.55 mg/dL (11-20-22 @ 06:04)  Creatinine, Serum: 0.53 mg/dL (11-19-22 @ 05:35)

## 2022-11-21 NOTE — PROGRESS NOTE ADULT - ASSESSMENT
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 Patient is admitted to be optimized for her surgical procedure.    Plan: OR tomorrow for RVATS, RUL Nodule Biopsy w/ IR Marking.    1: hx of lymphoma: DBLCL  2: Pleural effusion :  3: HLD:   '4: ANCA ASSOCIATED VASCULITIS      11/17:    1: hx of lymphoma: DBLCL: S/P SURGERY RUL wedge resection and LN biopsy : await results:   2: Pleural effusion : not much of chest xray  : but ct scan chest revelas more pleurl effusion on rt sdie then left:  send for chem and cultures   3: HLD: : per PMD  '4: ANCA ASSOCIATED VASCULITIS : sHE HAD FEVER:  RHEUMATOLOGY FOLLOWING ON ANTIBIOTICS:    DW THORACIC     11/18:    1: hx of lymphoma: DBLCL: S/P SURGERY RUL wedge resection and LN biopsy : await results:   2: Pleural effusion : not much of chest xray  : but ct scan chest reveals more pleural effusion on rt side then left: s/p thoracentesis : sent for cultures:  has exudative effusion ? malignancy ? lymphoma  3: HLD: : per PMD  '4: ANCA ASSOCIATED VASCULITIS : ssHE HAD FEVER:  RHEUMATOLOGY FOLLOWING ON ANTIBIOTICS: AFEBRILE IN LAST 24 HOURS AND IS ON CEFEPIME:     DW THORACIC AND ACP     11/20:      1: hx of lymphoma: DBLCL: S/P SURGERY RUL wedge resection and LN biopsy : await results:   2: Pleural effusion : not much of chest xray  : but ct scan chest reveals more pleural effusion on rt side then left: s/p thoracentesis : sent for cultures:  has exudative effusion ? malignancy ? lymphoma : await flow cyto   3: HLD: : per PMD  '4: ANCA ASSOCIATED VASCULITIS : ssHE HAD FEVER:  RHEUMATOLOGY FOLLOWING ON ANTIBIOTICS: AFEBRILE IN LAST 72 HOURS AND IS ON CEFEPIME:     DW  ACP     11/21:      1: hx of lymphoma: DBLCL: S/P SURGERY RUL wedge resection and LN biopsy : await results:   2: Pleural effusion : not much of chest xray  : but ct scan chest reveals more pleural effusion on rt side then left: s/p thoracentesis : sent for cultures:  has exudative effusion ? malignancy ? lymphoma : await flow cyto   3: HLD: : per PMD  '4: ANCA ASSOCIATED VASCULITIS : SHE HAD FEVER:  RHEUMATOLOGY FOLLOWING ON ANTIBIOTICS: AFEBRILE IN LAST 72 HOURS AND IS still ON CEFEPIME:     LEIGHA  ACP  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), s/p LVATS, LUIS wedge resection (2021) direct admit for RVATS, RUL Nodule Biopsy w/ IR marking on 11/10. Patient is admitted to be optimized for her surgical procedure.    Plan: OR tomorrow for RVATS, RUL Nodule Biopsy w/ IR Marking.    1: hx of lymphoma: DBLCL  2: Pleural effusion :  3: HLD:   '4: ANCA ASSOCIATED VASCULITIS      11/17:    1: hx of lymphoma: DBLCL: S/P SURGERY RUL wedge resection and LN biopsy : await results:   2: Pleural effusion : not much of chest xray  : but ct scan chest revelas more pleurl effusion on rt sdie then left:  send for chem and cultures   3: HLD: : per PMD  '4: ANCA ASSOCIATED VASCULITIS : sHE HAD FEVER:  RHEUMATOLOGY FOLLOWING ON ANTIBIOTICS:    DW THORACIC     11/18:    1: hx of lymphoma: DBLCL: S/P SURGERY RUL wedge resection and LN biopsy : await results:   2: Pleural effusion : not much of chest xray  : but ct scan chest reveals more pleural effusion on rt side then left: s/p thoracentesis : sent for cultures:  has exudative effusion ? malignancy ? lymphoma  3: HLD: : per PMD  '4: ANCA ASSOCIATED VASCULITIS : ssHE HAD FEVER:  RHEUMATOLOGY FOLLOWING ON ANTIBIOTICS: AFEBRILE IN LAST 24 HOURS AND IS ON CEFEPIME:     DW THORACIC AND ACP     11/20:      1: hx of lymphoma: DBLCL: S/P SURGERY RUL wedge resection and LN biopsy : await results:   2: Pleural effusion : not much of chest xray  : but ct scan chest reveals more pleural effusion on rt side then left: s/p thoracentesis : sent for cultures:  has exudative effusion ? malignancy ? lymphoma : await flow cyto   3: HLD: : per PMD  '4: ANCA ASSOCIATED VASCULITIS : ssHE HAD FEVER:  RHEUMATOLOGY FOLLOWING ON ANTIBIOTICS: AFEBRILE IN LAST 72 HOURS AND IS ON CEFEPIME:     DW  ACP     11/21:      1: hx of lymphoma: DBLCL: S/P SURGERY RUL wedge resection and LN biopsy : await results:   2: Pleural effusion : not much of chest xray  : but ct scan chest reveals more pleural effusion on rt side then left: s/p thoracentesis : sent for cultures:  has exudative effusion ? malignancy ? lymphoma : await flow cyto   3: HLD: : per PMD  '4: ANCA ASSOCIATED VASCULITIS : SHE HAD FEVER:  RHEUMATOLOGY FOLLOWING ON ANTIBIOTICS: AFEBRILE IN LAST 72 HOURS AND IS still ON CEFEPIME: ;  5: now with covid : on remdesivir:  ? is she a candidate for mab? defer to id    LEIGHA  ACP

## 2022-11-21 NOTE — PROGRESS NOTE ADULT - ASSESSMENT
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 Patient states that recently she has been feeling very fatigued.    Fatigue/dyspnea, with hx of Lymphoma  - Recent TTE on 11/3/22 reviewed: normal LV. outpt card Dr. Ady Cooper  - PT also saw pulm Mack Tucker in the office, with some concern for her overall status. She was hypotensive to systolic 90s in the office.  (now BP improves s/p IVF here).   - s/p thoracoscopic biopsy of mediastinal lymph node and Lung wedge resection 11/10/22  - path results favoring vasculitis, but final stains to r/o infection are still pending; no evidence for lymphoma  - appreciate rheum, heme-onc    Fever either 2' ANCA-mediated vasculitis vs infection  - UA, CXR unimpressive. RVP neg.   - no leukocytosis, remain stable clinically  - procal low.  - LE venous dopplers (-) for DVT  - s/p rt thoracentesis 11/17/22  - cefepime started 11/18/22 following worsened leukocytosis 11/18/22   - blood cxs 11/18/22 --> (-)  - card consulted (Dr. Hooker) appreciated  - ID appreciated    Rt pleural effusion  - CTA chest 11/16/22 revealed moderate rt effusion  - s/p 650 cc U/S-guided rt thoracentesis 11/17/22  - exudative but no neutrophilic predominance and initial Gram stain w/o organisms  - f/u final cultures  - pulmonology consult appreciated    Tachycardia likely 2' fever  - cont low-dose metoprolol    Anemia, unspecified  - hgb 8.1 lower than her baseline.  - no melena, no hematochezia. no BRBPR.   - recent Anemia work-up reviewed: normal vit b12, folate.  - repeat iron studies ordered    - s/p 2 units pRBC 11/1/22 per heme's note.   - s/p 1 unit pRBC 11/9/22 with appropriate post transfusion hgb.    - no melena, no hematochezia. no BRBPR.   - 11/21/22 --> will need another unit of pRBC    Anxiety and depression  - stable, continue citalopram.  - Pt denied SI/HI ideations, denied visual and auditory hallucinations.     GERD (gastroesophageal reflux disease)  - continue PPI    Hyperlipidemia.   - continue home ezetimibe. okay to hold if nonformulary   - Lipid panel    DVT ppx   - SC heparin    Disposition  - PT: rehab. Pt now agreeable to rehab

## 2022-11-21 NOTE — PROGRESS NOTE ADULT - ASSESSMENT

## 2022-11-21 NOTE — PROGRESS NOTE ADULT - SUBJECTIVE AND OBJECTIVE BOX
[facility] [place] NEPHROLOGY PRACTICE   MD RONEL FRIAS MD KRISTINE SOLTANPOUR, DO ANGELA WONG, PA    TEL:  OFFICE: [phone]  From 5pm-7am Answering Service [phone]    -- RENAL FOLLOW UP NOTE ---Date of Service 11-21-22 @ 12:10    Patient is a 81y old  Female who presents with a chief complaint of RVATS, RUL Nodule Biopsy w/ IR marking (21 Nov 2022 11:56)      Patient seen and examined at bedside. No chest pain/sob    VITALS:  T(F): 98 (11-21-22 @ 08:37), Max: 99.4 (11-21-22 @ 05:05)  HR: 84 (11-21-22 @ 08:37)  BP: 124/45 (11-21-22 @ 08:37)  RR: 19 (11-21-22 @ 08:37)  SpO2: 97% (11-21-22 @ 08:37)  Wt(kg): --    11-20 @ 07:01  -  11-21 @ 07:00  --------------------------------------------------------  IN: 0 mL / OUT: 0 mL / NET: 0 mL    11-21 @ 07:01  -  11-21 @ 12:10  --------------------------------------------------------  IN: 540 mL / OUT: 300 mL / NET: 240 mL          PHYSICAL EXAM:  General: NAD  Neck: No JVD  Respiratory: CTAB, no wheezes, rales or rhonchi  Cardiovascular: S1, S2, RRR  Gastrointestinal: BS+, soft, NT/ND  Extremities: No peripheral edema    Hospital Medications:   MEDICATIONS  (STANDING):  acetaminophen   IVPB .. 750 milliGRAM(s) IV Intermittent once  cefepime   IVPB 2000 milliGRAM(s) IV Intermittent every 8 hours  citalopram 10 milliGRAM(s) Oral daily  fluticasone propionate 50 MICROgram(s)/spray Nasal Spray 1 Spray(s) Both Nostrils two times a day  folic acid 1 milliGRAM(s) Oral daily  heparin   Injectable 5000 Unit(s) SubCutaneous every 12 hours  lidocaine   4% Patch 1 Patch Transdermal daily  melatonin 3 milliGRAM(s) Oral at bedtime  metoprolol tartrate 25 milliGRAM(s) Oral two times a day  pantoprazole    Tablet 40 milliGRAM(s) Oral before breakfast  polyethylene glycol 3350 17 Gram(s) Oral daily  senna 1 Tablet(s) Oral daily  sodium chloride 1 Gram(s) Oral three times a day      LABS:  11-21    135  |  103  |  18  ----------------------------<  95  3.4<L>   |  18<L>  |  0.62    Ca    7.8<L>      21 Nov 2022 04:43  Phos  2.1     11-21  Mg     1.80     11-21      Creatinine Trend: 0.62 <--, 0.55 <--, 0.53 <--, 0.60 <--, 0.55 <--, 0.60 <--, 0.55 <--, 0.63 <--, 0.57 <--    Phosphorus Level, Serum: 2.1 mg/dL (11-21 @ 04:43)                              6.7    12.82 )-----------( 278      ( 21 Nov 2022 04:43 )             21.6     Urine Studies:  Urinalysis - [11-17-22 @ 20:04]      Color Judy / Appearance Clear / SG 1.044 / pH 6.5      Gluc Negative / Ketone Small  / Bili Small / Urobili >12 mg/dL       Blood Small / Protein 100 mg/dL / Leuk Est Negative / Nitrite Negative      RBC 12 / WBC 11 / Hyaline 9 / Gran  / Sq Epi  / Non Sq Epi 11 / Bacteria Few    Urine Creatinine 126      [11-17-22 @ 20:04]  Urine Protein 126      [11-17-22 @ 20:04]  Urine Sodium 119      [11-17-22 @ 20:04]    Iron 97, TIBC <127, %sat --      [11-10-22 @ 06:55]  Ferritin 986      [11-10-22 @ 06:55]  Vitamin D (25OH) 38.3      [11-15-22 @ 06:47]  Lipid: chol 84, TG 81, HDL 22, LDL --      [10-01-22 @ 07:10]    HBsAb Nonreact      [11-11-22 @ 04:15]  HBsAg Nonreact      [11-11-22 @ 04:15]  HBcAb Nonreact      [11-11-22 @ 04:15]  HCV 0.10, Nonreact      [11-15-22 @ 06:47]    ANCA: cANCA Negative, pANCA Negative, atypical ANCA Indeterminate Method interference due to CATHERINE Fluorescence      [11-10-22 @ 06:55]  MPO-ANCA <5.0, interpretation: Negative      [11-10-22 @ 06:55]  PR3-ANCA <5.0, interpretation: Negative      [11-10-22 @ 06:55]    RADIOLOGY & ADDITIONAL STUDIES:

## 2022-11-21 NOTE — PROGRESS NOTE ADULT - SUBJECTIVE AND OBJECTIVE BOX
Date of Service: 11-21-22 @ 13:34    Patient is a 81y old  Female who presents with a chief complaint of RVATS, RUL Nodule Biopsy w/ IR marking (21 Nov 2022 12:09)      Any change in ROS:  doing ok : no sob: now found to have covid :     MEDICATIONS  (STANDING):  acetaminophen   IVPB .. 750 milliGRAM(s) IV Intermittent once  cefepime   IVPB 2000 milliGRAM(s) IV Intermittent every 8 hours  citalopram 10 milliGRAM(s) Oral daily  fluticasone propionate 50 MICROgram(s)/spray Nasal Spray 1 Spray(s) Both Nostrils two times a day  folic acid 1 milliGRAM(s) Oral daily  heparin   Injectable 5000 Unit(s) SubCutaneous every 12 hours  lidocaine   4% Patch 1 Patch Transdermal daily  melatonin 3 milliGRAM(s) Oral at bedtime  metoprolol tartrate 25 milliGRAM(s) Oral two times a day  pantoprazole    Tablet 40 milliGRAM(s) Oral before breakfast  polyethylene glycol 3350 17 Gram(s) Oral daily  remdesivir  IVPB   IV Intermittent   remdesivir  IVPB 200 milliGRAM(s) IV Intermittent every 24 hours  senna 1 Tablet(s) Oral daily  sodium chloride 1 Gram(s) Oral three times a day    MEDICATIONS  (PRN):  acetaminophen     Tablet .. 650 milliGRAM(s) Oral every 6 hours PRN Mild Pain (1 - 3)  benzonatate 200 milliGRAM(s) Oral three times a day PRN Cough  guaiFENesin Oral Liquid (Sugar-Free) 200 milliGRAM(s) Oral every 6 hours PRN Cough  HYDROmorphone   Tablet 1 milliGRAM(s) Oral every 3 hours PRN Moderate-Severe Pain (4 - 10)  HYDROmorphone  Injectable 0.2 milliGRAM(s) IV Push every 3 hours PRN Severe Breakthrough Pain (7 - 10)  naloxone Injectable 0.1 milliGRAM(s) IV Push every 3 minutes PRN For ANY of the following changes in patient status:  A. RR LESS THAN 10 breaths per minute, B. Oxygen saturation LESS THAN 90%, C. Sedation score of 6  ondansetron Injectable 4 milliGRAM(s) IV Push every 6 hours PRN Nausea    Vital Signs Last 24 Hrs  T(C): 36.6 (21 Nov 2022 11:57), Max: 37.4 (21 Nov 2022 05:05)  T(F): 97.9 (21 Nov 2022 11:57), Max: 99.4 (21 Nov 2022 05:05)  HR: 87 (21 Nov 2022 11:57) (82 - 108)  BP: 132/50 (21 Nov 2022 11:57) (121/39 - 135/52)  BP(mean): --  RR: 19 (21 Nov 2022 11:57) (18 - 19)  SpO2: 96% (21 Nov 2022 11:57) (95% - 100%)    Parameters below as of 21 Nov 2022 11:57  Patient On (Oxygen Delivery Method): room air        I&O's Summary    20 Nov 2022 07:01  -  21 Nov 2022 07:00  --------------------------------------------------------  IN: 0 mL / OUT: 0 mL / NET: 0 mL    21 Nov 2022 07:01  -  21 Nov 2022 13:34  --------------------------------------------------------  IN: 540 mL / OUT: 300 mL / NET: 240 mL          Physical Exam:   GENERAL: NAD, well-groomed, well-developed  HEENT: RANJIT/   Atraumatic, Normocephalic  ENMT: No tonsillar erythema, exudates, or enlargement; Moist mucous membranes, Good dentition, No lesions  NECK: Supple, No JVD, Normal thyroid  CHEST/LUNG: Clear to auscultaion  CVS: Regular rate and rhythm; No murmurs, rubs, or gallops  GI: : Soft, Nontender, Nondistended; Bowel sounds present  NERVOUS SYSTEM:  Alert & Oriented X3  EXTREMITIES:  - edema  LYMPH: No lymphadenopathy noted  SKIN: No rashes or lesions  ENDOCRINOLOGY: No Thyromegaly  PSYCH: Appropriate    Labs:                              6.7    12.82 )-----------( 278      ( 21 Nov 2022 04:43 )             21.6                         7.8    11.47 )-----------( 213      ( 20 Nov 2022 06:04 )             25.1                         7.3    12.83 )-----------( 211      ( 19 Nov 2022 05:35 )             23.6                         7.6    18.10 )-----------( 215      ( 18 Nov 2022 05:15 )             24.6     11-21    135  |  103  |  18  ----------------------------<  95  3.4<L>   |  18<L>  |  0.62  11-20    133<L>  |  104  |  15  ----------------------------<  115<H>  3.6   |  21<L>  |  0.55  11-19    134<L>  |  102  |  15  ----------------------------<  104<H>  3.9   |  22  |  0.53  11-18    132<L>  |  100  |  12  ----------------------------<  115<H>  4.3   |  22  |  0.60    Ca    7.8<L>      21 Nov 2022 04:43  Ca    7.6<L>      20 Nov 2022 06:04  Phos  2.1     11-21  Phos  2.9     11-20  Mg     1.80     11-21  Mg     1.90     11-20    TPro  6.3  /  Alb  2.3<L>  /  TBili  0.9  /  DBili  x   /  AST  14  /  ALT  6   /  AlkPhos  89  11-18    CAPILLARY BLOOD GLUCOSE      rad    < from: Xray Chest 1 View- PORTABLE-Routine (Xray Chest 1 View- PORTABLE-Routine in AM.) (11.18.22 @ 08:26) >  PROCEDURE DATE:  11/18/2022          INTERPRETATION:  CLINICAL INFORMATION: Follow-up post thoracotomy with   effusion.    TIME OF EXAMINATION: November 18 at 6:28 AM    EXAM: Portable chest    FINDINGS:  The lungs are free of focal abnormalities. Hazy lung bases has the   appearance of trace layering effusions. Left-sided chemotherapy port in   place. No pneumothorax.        COMPARISON: November 17        IMPRESSION: Clear lungs with bilateral trace pleural effusions.    --- End of Report ---            DANIEL NASSAR MD; Attending Radiologist  This document has been electronically signed. Nov 18 2022  2:11PM    < end of copied text >        Fluid Source --  Albumin, Fluid1.5 g/dL  Glucose, [id number] mg/dL  Protein total, Fluid2.9 g/dL  Lacatate Dehydrogenase, [id number] U/L  pH, Fluid7.7  Cytopathology-Non Gyn Report--        RECENT CULTURES:  11-18 @ 09:45 .Blood Blood-Peripheral                No growth to date.    11-18 @ 06:00 .Blood Blood-Peripheral                No growth to date.    11-17 @ 16:04 Pleural Fl Pleural Fluid       polymorphonuclear leukocytes seen  No organisms seen  by cytocentrifuge           No growth    11-14 @ 23:17 .Blood Blood-Peripheral                No Growth Final    11-14 @ 21:40 .Blood Blood-Peripheral                No Growth Final          RESPIRATORY CULTURES:          Studies  Chest X-RAY  CT SCAN Chest   Venous Dopplers: LE:   CT Abdomen  Others

## 2022-11-21 NOTE — PROGRESS NOTE ADULT - ASSESSMENT
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), s/p LVATS, LUIS wedge resection (2021) direct admit for RVATS, RUL Nodule Biopsy w/ IR marking    #Tachycardia  -stable, improved on BB  -likely driven by overall medical issues, deconditioning  -recent echo w normal LVEF and mild-moderate MS  -s/p IVF  -cont bb as ordered for htn/tachy    #B cell Lymphoma s/p RVATS, RUL nodule biopsy  -onc followup  -s/p thoracentesis 11/17    #H/o DVT/PE  -LE dopplers neg  -CTPA noted, no pe  -some suggestion of pulmonary edema, effusions  -clinically not overloaded  -diuretics prn for inc dyspnea/hypoxia   -off ivf     #anemia   -prbc's per med    #R/o vasculitis   -w/u per primary team   35 minutes spent on total encounter; more than 50% of the visit was spent counseling and/or coordinating care by the attending physician.

## 2022-11-21 NOTE — DIETITIAN NUTRITION RISK NOTIFICATION - TREATMENT: THE FOLLOWING DIET HAS BEEN RECOMMENDED
Diet, Regular:   1500mL Fluid Restriction ([id number])  Supplement Feeding Modality:  Oral  Ensure Enlive Cans or Servings Per Day:  1       Frequency:  Two Times a day (11-20-22 @ 18:01) [Active]

## 2022-11-22 NOTE — PROGRESS NOTE ADULT - ASSESSMENT

## 2022-11-22 NOTE — PROGRESS NOTE ADULT - ATTENDING COMMENTS
Patient seen with Fellow at bedside.  As discussed with pathology, there is no lymphoma noted on the lung biopsy.  Patient is made aware of this finding.  I have requested that the pathology report be posted on inpatient record for review.  This will be addressed.  Rheumatology will consider immunosuppressive therapy.    Douglas Clark MD  Hematology Attending  cell [phone]

## 2022-11-22 NOTE — PROGRESS NOTE ADULT - SUBJECTIVE AND OBJECTIVE BOX
[facility], Division of Infectious Diseases  ANDREW Rodríguez Y. Patel, S. Shah, G. Bebeto  [phone]  ([phone] - weekdays after 5pm and weekends)    Name: BETTIE PRATER  Age/Gender: 81y Female  MRN: [id number]    Interval History:  Patient seen this morning.   Notes reviewed.   No concerning overnight events.  Afebrile.   states she feels fine, feels better than yesterday  denies SOB  s/p transfusion yesterday    Allergies: codeine (Nausea)  doxycycline (Nausea)  penicillin (Hives)      Objective:  Vitals:   T(F): 97.3 (11-22-22 @ 08:19), Max: 98.6 (11-22-22 @ 05:00)  HR: 119 (11-22-22 @ 08:19) (87 - 119)  BP: 144/77 (11-22-22 @ 08:19) (132/50 - 152/70)  RR: 20 (11-22-22 @ 08:19) (18 - 24)  SpO2: 97% (11-22-22 @ 08:19) (92% - 97%)  Physical Examination:  General: no acute distress  HEENT: anicteric  Cardio: normal rate, L chest mediport, no erythema  Resp: clear to auscultation anteriorly  Abd: soft, NT, ND  Ext: no LE edema  Skin: warm, dry    Laboratory Studies:  CBC:                       10.0   22.07 )-----------( 320      ( 22 Nov 2022 07:48 )             31.0     WBC Trend:  22.07 11-22-22 @ 07:48  16.19 11-21-22 @ 17:12  12.82 11-21-22 @ 04:43  11.47 11-20-22 @ 06:04  12.83 11-19-22 @ 05:35  18.10 11-18-22 @ 05:15  12.35 11-17-22 @ 05:10  7.52 11-16-22 @ 06:39    CMP: 11-22    132<L>  |  104  |  13  ----------------------------<  196<H>  3.7   |  16<L>  |  0.49<L>    Ca    7.8<L>      22 Nov 2022 07:48  Phos  2.6     11-22  Mg     2.10     11-22    TPro  x   /  Alb  x   /  TBili  0.7  /  DBili  0.3  /  AST  x   /  ALT  x   /  AlkPhos  x   11-22            Microbiology: reviewed     Culture - Blood (collected 11-18-22 @ 09:45)  Source: .Blood Blood-Peripheral  Preliminary Report (11-19-22 @ 13:02):    No growth to date.    Culture - Blood (collected 11-18-22 @ 06:00)  Source: .Blood Blood-Peripheral  Preliminary Report (11-19-22 @ 13:02):    No growth to date.    Culture - Body Fluid with Gram Stain (collected 11-17-22 @ 16:04)  Source: Pleural Fl Pleural Fluid  Gram Stain (11-17-22 @ 22:06):    polymorphonuclear leukocytes seen    No organisms seen    by cytocentrifuge  Preliminary Report (11-18-22 @ 16:58):    No growth    Culture - Blood (collected 11-14-22 @ 23:17)  Source: .Blood Blood-Peripheral  Final Report (11-20-22 @ 07:00):    No Growth Final    Culture - Blood (collected 11-14-22 @ 21:40)  Source: .Blood Blood-Peripheral  Final Report (11-20-22 @ 04:00):    No Growth Final        Radiology: reviewed     Medications:  acetaminophen     Tablet .. 650 milliGRAM(s) Oral every 6 hours PRN  acetaminophen   IVPB .. 750 milliGRAM(s) IV Intermittent once  benzonatate 200 milliGRAM(s) Oral three times a day PRN  citalopram 10 milliGRAM(s) Oral daily  fluticasone propionate 50 MICROgram(s)/spray Nasal Spray 1 Spray(s) Both Nostrils two times a day  folic acid 1 milliGRAM(s) Oral daily  guaiFENesin Oral Liquid (Sugar-Free) 200 milliGRAM(s) Oral every 6 hours PRN  heparin   Injectable 5000 Unit(s) SubCutaneous every 12 hours  HYDROmorphone   Tablet 1 milliGRAM(s) Oral every 3 hours PRN  HYDROmorphone  Injectable 0.2 milliGRAM(s) IV Push every 3 hours PRN  levalbuterol Inhalation 0.63 milliGRAM(s) Inhalation every 6 hours  lidocaine   4% Patch 1 Patch Transdermal daily  melatonin 3 milliGRAM(s) Oral at bedtime  metoprolol tartrate 25 milliGRAM(s) Oral two times a day  naloxone Injectable 0.1 milliGRAM(s) IV Push every 3 minutes PRN  ondansetron Injectable 4 milliGRAM(s) IV Push every 6 hours PRN  pantoprazole    Tablet 40 milliGRAM(s) Oral before breakfast  polyethylene glycol 3350 17 Gram(s) Oral daily  remdesivir  IVPB   IV Intermittent   remdesivir  IVPB 100 milliGRAM(s) IV Intermittent every 24 hours  senna 1 Tablet(s) Oral daily    Antimicrobials:  remdesivir  IVPB   IV Intermittent   remdesivir  IVPB 100 milliGRAM(s) IV Intermittent every 24 hours

## 2022-11-22 NOTE — PROGRESS NOTE ADULT - SUBJECTIVE AND OBJECTIVE BOX
[facility] [place] [facility]   MD RONEL FRIAS MD KRISTINE SOLTANPOUR, DO ANGELA WONG, PA    TEL:  OFFICE: [phone]  From 5pm-7am Answering Service [phone]    -- RENAL FOLLOW UP NOTE ---Date of Service 11-22-22 @ 15:05    Patient is a 81y old  Female who presents with a chief complaint of RVATS, RUL Nodule Biopsy w/ IR marking (22 Nov 2022 13:45)      Patient seen and examined at bedside. +sob and cough    VITALS:  T(F): 97.3 (11-22-22 @ 08:19), Max: 98.6 (11-22-22 @ 05:00)  HR: 119 (11-22-22 @ 08:19)  BP: 144/77 (11-22-22 @ 08:19)  RR: 20 (11-22-22 @ 08:19)  SpO2: 97% (11-22-22 @ 08:19)  Wt(kg): --    11-21 @ 07:01  -  11-22 @ 07:00  --------------------------------------------------------  IN: 660 mL / OUT: 500 mL / NET: 160 mL          PHYSICAL EXAM:  General: NAD  Neck: No JVD  Respiratory: diffused rhonchi  Cardiovascular: S1, S2, RRR  Gastrointestinal: BS+, soft, NT/ND  Extremities: No peripheral edema    Hospital Medications:   MEDICATIONS  (STANDING):  acetaminophen   IVPB .. 750 milliGRAM(s) IV Intermittent once  citalopram 10 milliGRAM(s) Oral daily  fluticasone propionate 50 MICROgram(s)/spray Nasal Spray 1 Spray(s) Both Nostrils two times a day  folic acid 1 milliGRAM(s) Oral daily  heparin   Injectable 5000 Unit(s) SubCutaneous every 12 hours  levalbuterol Inhalation 0.63 milliGRAM(s) Inhalation every 6 hours  lidocaine   4% Patch 1 Patch Transdermal daily  melatonin 3 milliGRAM(s) Oral at bedtime  metoprolol tartrate 25 milliGRAM(s) Oral two times a day  pantoprazole    Tablet 40 milliGRAM(s) Oral before breakfast  polyethylene glycol 3350 17 Gram(s) Oral daily  remdesivir  IVPB   IV Intermittent   remdesivir  IVPB 100 milliGRAM(s) IV Intermittent every 24 hours  senna 1 Tablet(s) Oral daily  sodium bicarbonate 650 milliGRAM(s) Oral three times a day      LABS:  11-22    132<L>  |  104  |  13  ----------------------------<  196<H>  3.7   |  16<L>  |  0.49<L>    Ca    7.8<L>      22 Nov 2022 07:48  Phos  2.6     11-22  Mg     2.10     11-22    TPro      /  Alb      /  TBili  0.7  /  DBili  0.3  /  AST      /  ALT      /  AlkPhos      11-22    Creatinine Trend: 0.49 <--, 0.62 <--, 0.55 <--, 0.53 <--, 0.60 <--, 0.55 <--, 0.60 <--, 0.55 <--    Phosphorus Level, Serum: 2.6 mg/dL (11-22 @ 07:48)  Ferritin, Serum: 1813 ng/mL (11-22 @ 07:48)                              10.0   22.07 )-----------( 320      ( 22 Nov 2022 07:48 )             31.0     Urine Studies:  Urinalysis - [11-17-22 @ 20:04]      Color Judy / Appearance Clear / SG 1.044 / pH 6.5      Gluc Negative / Ketone Small  / Bili Small / Urobili >12 mg/dL       Blood Small / Protein 100 mg/dL / Leuk Est Negative / Nitrite Negative      RBC 12 / WBC 11 / Hyaline 9 / Gran  / Sq Epi  / Non Sq Epi 11 / Bacteria Few    Urine Creatinine 126      [11-17-22 @ 20:04]  Urine Protein 126      [11-17-22 @ 20:04]  Urine Sodium 119      [11-17-22 @ 20:04]    Iron 97, TIBC <127, %sat --      [11-10-22 @ 06:55]  Ferritin 1813      [11-22-22 @ 07:48]  Vitamin D (25OH) 38.3      [11-15-22 @ 06:47]  Lipid: chol 84, TG 81, HDL 22, LDL --      [10-01-22 @ 07:10]    HBsAb Nonreact      [11-11-22 @ 04:15]  HBsAg Nonreact      [11-11-22 @ 04:15]  HBcAb Nonreact      [11-11-22 @ 04:15]  HCV 0.10, Nonreact      [11-15-22 @ 06:47]    ANCA: cANCA Negative, pANCA Negative, atypical ANCA Indeterminate Method interference due to CATHERINE Fluorescence      [11-10-22 @ 06:55]  MPO-ANCA <5.0, interpretation: Negative      [11-10-22 @ 06:55]  PR3-ANCA <5.0, interpretation: Negative      [11-10-22 @ 06:55]    RADIOLOGY & ADDITIONAL STUDIES:

## 2022-11-22 NOTE — PROGRESS NOTE ADULT - SUBJECTIVE AND OBJECTIVE BOX
CARDIOLOGY FOLLOW UP - Dr. Hooker  Date of Service: 11/22/22  CC:    Review of Systems:  Constitutional: No fever, weight loss, or fatigue  Respiratory: No cough, wheezing, or hemoptysis, no shortness of breath  Cardiovascular: No chest pain, palpitations, passing out, dizziness, or leg swelling  Gastrointestinal: No abd or epigastric pain. No nausea, vomiting, or hematemesis; no diarrhea or consiptaiton, no melena or hematochezia  Vascular: No edema     TELEMETRY:    PHYSICAL EXAM:  T(C): 36.3 (11-22-22 @ 08:19), Max: 37 (11-22-22 @ 05:00)  HR: 119 (11-22-22 @ 08:19) (87 - 119)  BP: 144/77 (11-22-22 @ 08:19) (132/50 - 152/70)  RR: 20 (11-22-22 @ 08:19) (18 - 24)  SpO2: 97% (11-22-22 @ 08:19) (92% - 97%)  Wt(kg): --  I&O's Summary    21 Nov 2022 07:01  -  22 Nov 2022 07:00  --------------------------------------------------------  IN: 660 mL / OUT: 500 mL / NET: 160 mL        Appearance: Normal	  Cardiovascular: Normal S1 S2,RRR, No JVD, No murmurs  Respiratory: Lungs clear to auscultation	  Gastrointestinal:  Soft, Non-tender, + BS	  Extremities: Normal range of motion, No clubbing, cyanosis or edema  Vascular: Peripheral pulses palpable 2+ bilaterally       Home Medications:  citalopram 10 mg oral tablet: 1 tab(s) orally once a day (04 Nov 2022 15:16)  ezetimibe 10 mg oral tablet: 1 tab(s) orally once a day (04 Nov 2022 15:16)  folic acid 1 mg oral tablet: 1 tab(s) orally once a day (04 Nov 2022 15:16)  omeprazole 20 mg oral delayed release capsule: 1 cap(s) orally prn (04 Nov 2022 15:16)  Sodium Chloride 1 g oral tablet: daily (04 Nov 2022 15:16)        MEDICATIONS  (STANDING):  acetaminophen   IVPB .. 750 milliGRAM(s) IV Intermittent once  citalopram 10 milliGRAM(s) Oral daily  fluticasone propionate 50 MICROgram(s)/spray Nasal Spray 1 Spray(s) Both Nostrils two times a day  folic acid 1 milliGRAM(s) Oral daily  heparin   Injectable 5000 Unit(s) SubCutaneous every 12 hours  levalbuterol Inhalation 0.63 milliGRAM(s) Inhalation every 6 hours  lidocaine   4% Patch 1 Patch Transdermal daily  melatonin 3 milliGRAM(s) Oral at bedtime  metoprolol tartrate 25 milliGRAM(s) Oral two times a day  pantoprazole    Tablet 40 milliGRAM(s) Oral before breakfast  polyethylene glycol 3350 17 Gram(s) Oral daily  remdesivir  IVPB   IV Intermittent   remdesivir  IVPB 100 milliGRAM(s) IV Intermittent every 24 hours  senna 1 Tablet(s) Oral daily  sodium bicarbonate 650 milliGRAM(s) Oral three times a day        EKG:  RADIOLOGY:  DIAGNOSTIC TESTING:  [ ] Echocardiogram:  [ ] Catherterization:  [ ] Stress Test:  OTHER:     LABS:	 	                          10.0   22.07 )-----------( 320      ( 22 Nov 2022 07:48 )             31.0     11-22    132<L>  |  104  |  13  ----------------------------<  196<H>  3.7   |  16<L>  |  0.49<L>    Ca    7.8<L>      22 Nov 2022 07:48  Phos  2.6     11-22  Mg     2.10     11-22    TPro  x   /  Alb  x   /  TBili  0.7  /  DBili  0.3  /  AST  x   /  ALT  x   /  AlkPhos  x   11-22          CARDIAC MARKERS:

## 2022-11-22 NOTE — PROGRESS NOTE ADULT - ASSESSMENT
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 Patient has been having worsening sob and fatigue along with recent PET/CT concerning for lymphoma recurrence.     # EBV+ DLBCL  [facility]. Dx 2021 s/p 4 cycles R-CHOP c/b fatigue, neutropenia and thrombocytopenia, last few cycles R-miniCHOP (completed 6 cycles total on 9/9/21 with EOT PET/CT LUIS nodule decrease in size and resolution of RUL nodule: Deauville 3. On surveillance imaging and found to have worsening symptoms of fatigue and sob with hospitalization 9/2022 CTA chest showed worsening GGOs and mediastinal LAD (i.e. 2 x 1.2 --> 2.4 x 1.8 cm), concerning for recurrence of her lymphoma. Stable residual LUIS mass (1.4 x 1 cm) compared to last CT. s/p EBUS guided FNA which revealed lymphoplasmacytic cells present but not diagnostic of recurrent DLBCL. PET/CT for restaging on 10/21/22: 1. Diffuse patchy mildly FDG-avid bilateral groundglass pulmonary opacities are new as compared to prior PET/CT, and appear increased since 9/30/2022, however, comparison is limited due to differences in CT technique. An FDG-avid right upper lobe pulmonary nodule is new as compared to prior PET/CT, and increased in size and density as compared to CT dated 9/30/2022 (SUV 9.5). Findings are concerning for progression of lymphoma. Correlate clinically to exclude infection. An irregular left upper lobe pulmonary nodule containing small focus of increased FDG activity is unchanged as compared to CT dated 9/30/2022, and is decreased in size and metabolism as compared to prior PET/CT. 2. Multiple nonspecific FDG-avid mildly enlarged mediastinal and bilateral hilar lymph nodes are increased in size, number, and metabolism. Differential diagnosis includes recurrent lymphoma.3. New nonspecific difuse splenic and bone marrow hypermetabolism may be secondary to lymphoma.4. Resolution of FDG-avid left maxillary sinus mucosal thickening. New non-FDG-avid minimal right maxillary sinus mucosal thickening.  - noted to be anemic to 7.2 Hgb 10/29/22 s/p 2 units PRBC 11/1/22   - Bone marrow bx 11/1/22 negative  - s/p flex bronch, right VATS RUL lung resection and mediastinal LN dissection on 11/10 with Dr. Pettit.  - Result of resection/biopsy is negative for lymphoma; seems to be granuloma-like lesions and more of an infectious etiology rather than due to vasculitis.   - EBV PCR <35  - Monitor CBC with diff and TLS labs daily (LDH, uric acid, K, Phos, Ca)   - Rheumatology holding immunosuppression in the setting of treatment for acute infection     #ANCA Vasculitis  - azathioprine has been on hold  - Appreciate rheumatology recs      Damion Basurto MD, PGY-4  Hematology/Medical Oncology Fellow  Pager: [phone]  Available on Microsoft Teams  After 5pm or on weekends please contact  to page on-call fellow

## 2022-11-22 NOTE — PROGRESS NOTE ADULT - ASSESSMENT
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), s/p LVATS, LUIS wedge resection (2021) who presented as for RVATS, RUL Nodule Biopsy    COVID  SARS-CoV-2 PCR positive  c/w remdesivir x 5 day course  monitor liver enzymes while on remdesivir  trend inflammatory markers  hold off steroids for COVID as pt <7 days  monitor SpO2  supportive care  isolation per infection control policy     fever  febrile to 101.5 on 11/14, afebrile since  no evidence of SSTI on exam, no erythema or tenderness surrounding mediport  no localizing signs of symptoms  RVP negative and SARS-CoV-2 PCR neg  quant gold negative  US LE neg for DVT  procal <.5  CTA chest- Moderate right and small left pleural effusions with some interlobular septal thickening suggestive of pulmonary edema. Small right pneumothorax.  s/p thoracentesis 11/17, exudative effusion, cell count not consistent w/ empyema, f/u cultures- no growth  f/u blood cultures- NGTD  pathology special stains negative for microorganisms  thus far all cultures, discontinue cefepime  rheumatology f/u for vasculitis; no objection to steroids if needed for vasculitis treatment    DLBCL  s/p R VATS with RUL wedge resection and mediastinal LN dissection on 11/10/22  pathology- heterogenous population of lymphocytes; not consistent w/ recurrence  pathology c/f vasculitis vs infection  hem/onc following    penicillin allergy  reports reaction- welts as a child  tolerated ceftriaxone last month  tolerating cefepime    Gio Tate M.D.  [facility], Division of Infectious Diseases  [phone]  After 5pm on weekdays and all day on weekends - please call [phone]

## 2022-11-22 NOTE — PROGRESS NOTE ADULT - SUBJECTIVE AND OBJECTIVE BOX
BETTIE PRATER 81y Female      Patient is a 81y old  Female who presents with a chief complaint of RVATS, RUL Nodule Biopsy w/ IR marking (22 Nov 2022 09:42)        INTERVAL HPI/OVERNIGHT EVENTS: No acute events overnight. Patient was seen and evaluated at the bedside. The patient denies pain. Vitals stable. Patient denies fever/chills, chest pain, shortness of breath, abdominal pain, headaches, nausea/vomiting, and diarrhea/constipation.      PHYSICAL EXAM:  GENERAL: NAD  HEAD:  Normocephalic  EYES:  conjunctiva and sclera clear  ENMT: Moist mucous membranes  NECK: Supple  NERVOUS SYSTEM:  Alert, awake  CHEST/LUNG: Good air exchange bilaterally, no wheeze  HEART: Regular rate and rhythm        Vital Signs Last 24 Hrs  T(C): 36.3 (22 Nov 2022 08:19), Max: 37 (22 Nov 2022 05:00)  T(F): 97.3 (22 Nov 2022 08:19), Max: 98.6 (22 Nov 2022 05:00)  HR: 119 (22 Nov 2022 08:19) (87 - 119)  BP: 144/77 (22 Nov 2022 08:19) (132/50 - 152/70)  BP(mean): --  RR: 20 (22 Nov 2022 08:19) (18 - 24)  SpO2: 97% (22 Nov 2022 08:19) (92% - 97%)    Parameters below as of 22 Nov 2022 08:19  Patient On (Oxygen Delivery Method): nasal cannula  O2 Flow (L/min): 2        MEDICATIONS  (STANDING):  acetaminophen   IVPB .. 750 milliGRAM(s) IV Intermittent once  citalopram 10 milliGRAM(s) Oral daily  fluticasone propionate 50 MICROgram(s)/spray Nasal Spray 1 Spray(s) Both Nostrils two times a day  folic acid 1 milliGRAM(s) Oral daily  heparin   Injectable 5000 Unit(s) SubCutaneous every 12 hours  levalbuterol Inhalation 0.63 milliGRAM(s) Inhalation every 6 hours  lidocaine   4% Patch 1 Patch Transdermal daily  melatonin 3 milliGRAM(s) Oral at bedtime  metoprolol tartrate 25 milliGRAM(s) Oral two times a day  pantoprazole    Tablet 40 milliGRAM(s) Oral before breakfast  polyethylene glycol 3350 17 Gram(s) Oral daily  remdesivir  IVPB   IV Intermittent   remdesivir  IVPB 100 milliGRAM(s) IV Intermittent every 24 hours  senna 1 Tablet(s) Oral daily  sodium bicarbonate 650 milliGRAM(s) Oral three times a day    MEDICATIONS  (PRN):  acetaminophen     Tablet .. 650 milliGRAM(s) Oral every 6 hours PRN Mild Pain (1 - 3)  benzonatate 200 milliGRAM(s) Oral three times a day PRN Cough  guaiFENesin Oral Liquid (Sugar-Free) 200 milliGRAM(s) Oral every 6 hours PRN Cough  HYDROmorphone   Tablet 1 milliGRAM(s) Oral every 3 hours PRN Moderate-Severe Pain (4 - 10)  HYDROmorphone  Injectable 0.2 milliGRAM(s) IV Push every 3 hours PRN Severe Breakthrough Pain (7 - 10)  naloxone Injectable 0.1 milliGRAM(s) IV Push every 3 minutes PRN For ANY of the following changes in patient status:  A. RR LESS THAN 10 breaths per minute, B. Oxygen saturation LESS THAN 90%, C. Sedation score of 6  ondansetron Injectable 4 milliGRAM(s) IV Push every 6 hours PRN Nausea      Consultant(s) Notes Reviewed:  [X] YES  [ ] NO  Care Discussed with Other Providers [X] YES  [ ] NO  Imaging Personally Reviewed:  [X] YES  [ ] NO      LABS:                        10.0   22.07 )-----------( 320      ( 22 Nov 2022 07:48 )             31.0     11-22    132<L>  |  104  |  13  ----------------------------<  196<H>  3.7   |  16<L>  |  0.49<L>    Ca    7.8<L>      22 Nov 2022 07:48  Phos  2.6     11-22  Mg     2.10     11-22    TPro  x   /  Alb  x   /  TBili  0.7  /  DBili  0.3  /  AST  x   /  ALT  x   /  AlkPhos  x   11-22

## 2022-11-22 NOTE — PROGRESS NOTE ADULT - ASSESSMENT
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 Patient states that recently she has been feeling very fatigued.    Fatigue/dyspnea, with hx of Lymphoma  - Recent TTE on 11/3/22 reviewed: normal LV. outpt card Dr. Ady Cooper  - PT also saw pulm Mack Tucker in the office, with some concern for her overall status. She was hypotensive to systolic 90s in the office.  (now BP improves s/p IVF here).   - s/p thoracoscopic biopsy of mediastinal lymph node and Lung wedge resection 11/10/22  - path results favoring vasculitis, but final stains to r/o infection are still pending; no evidence for lymphoma  - 11/22/22 a pt felt more SOB early morning hours, placed on 2LNCO2, CXR pending, ? initiation of IV steroids?, medicine team to touch base with rheumatology service  - appreciate rheum, heme-onc    Fever either 2' ANCA-mediated vasculitis vs infection  - UA, CXR unimpressive. RVP neg.   - no leukocytosis, remain stable clinically  - procal low.  - LE venous dopplers (-) for DVT  - s/p rt thoracentesis 11/17/22  - cefepime started 11/18/22 following worsened leukocytosis 11/18/22   - blood cxs 11/18/22 --> (-)  - ID appreciated    (+)COVID-19 11/21/22  - remdesivir 11/21/22-->11/23/22  - medicine service to clarify steroid initiation with rheumatology service    Rt pleural effusion  - CTA chest 11/16/22 revealed moderate rt effusion  - s/p 650 cc U/S-guided rt thoracentesis 11/17/22  - exudative but no neutrophilic predominance and initial Gram stain w/o organisms  - f/u final cultures  - pulmonology consult appreciated    Tachycardia likely 2' fever  - cont low-dose metoprolol  - card consulted (Dr. Hooker) appreciated    Anemia, unspecified  - hgb 8.1 lower than her baseline.  - no melena, no hematochezia. no BRBPR.   - recent Anemia work-up reviewed: normal vit b12, folate.  - repeat iron studies ordered    - s/p 2 units pRBC 11/1/22 per heme's note.   - s/p 1 unit pRBC 11/9/22 with appropriate post transfusion hgb.    - no melena, no hematochezia. no BRBPR.   - 11/21/22 --> s/p another unit of pRBC    Anxiety and depression  - stable, continue citalopram.  - Pt denied SI/HI ideations, denied visual and auditory hallucinations.     GERD (gastroesophageal reflux disease)  - continue PPI    Hyperlipidemia.   - continue home ezetimibe. okay to hold if nonformulary   - Lipid panel    DVT ppx   - SC heparin    Disposition  - PT: rehab. 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 Patient states that recently she has been feeling very fatigued.    Fatigue/dyspnea, with hx of Lymphoma  - Recent TTE on 11/3/22 reviewed: normal LV. outpt card Dr. Ady Cooper  - PT also saw pulm Mack Tucker in the office, with some concern for her overall status. She was hypotensive to systolic 90s in the office.  (now BP improves s/p IVF here).   - s/p thoracoscopic biopsy of mediastinal lymph node and Lung wedge resection 11/10/22  - path results favoring vasculitis, but final stains to r/o infection are still pending; no evidence for lymphoma  - 11/22/22 a pt felt more SOB early morning hours, placed on 2LNCO2, CXR pending, ? initiation of IV steroids?, medicine team to touch base with rheumatology service  - appreciate rheum, heme-onc    Fever either 2' ANCA-mediated vasculitis vs infection  - UA, CXR unimpressive. RVP neg.   - no leukocytosis, remain stable clinically  - procal low.  - LE venous dopplers (-) for DVT  - s/p rt thoracentesis 11/17/22  - cefepime started 11/18/22 following worsened leukocytosis 11/18/22   - blood cxs 11/18/22 --> (-)  - cefepime stopped 11/22/22  - ID appreciated    (+)COVID-19 11/21/22  - remdesivir 11/21/22-->11/23/22  - medicine service to clarify steroid initiation with rheumatology service    Rt pleural effusion  - CTA chest 11/16/22 revealed moderate rt effusion  - s/p 650 cc U/S-guided rt thoracentesis 11/17/22  - exudative but no neutrophilic predominance and initial Gram stain w/o organisms  - f/u final cultures  - pulmonology consult appreciated    Tachycardia likely 2' fever  - cont low-dose metoprolol  - card consulted (Dr. Hooker) appreciated    Anemia, unspecified  - hgb 8.1 lower than her baseline.  - no melena, no hematochezia. no BRBPR.   - recent Anemia work-up reviewed: normal vit b12, folate.  - repeat iron studies ordered    - s/p 2 units pRBC 11/1/22 per heme's note.   - s/p 1 unit pRBC 11/9/22 with appropriate post transfusion hgb.    - no melena, no hematochezia. no BRBPR.   - 11/21/22 --> s/p another unit of pRBC    Anxiety and depression  - stable, continue citalopram.  - Pt denied SI/HI ideations, denied visual and auditory hallucinations.     GERD (gastroesophageal reflux disease)  - continue PPI    Hyperlipidemia.   - continue home ezetimibe. okay to hold if nonformulary   - Lipid panel    DVT ppx   - SC heparin    Disposition  - PT: rehab. Pt now agreeable to rehab

## 2022-11-22 NOTE — PROGRESS NOTE ADULT - ASSESSMENT
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 Patient is admitted to be optimized for her surgical procedure.    Plan: OR tomorrow for RVATS, RUL Nodule Biopsy w/ IR Marking.    1: hx of lymphoma: DBLCL  2: Pleural effusion :  3: HLD:   '4: ANCA ASSOCIATED VASCULITIS      11/17:    1: hx of lymphoma: DBLCL: S/P SURGERY RUL wedge resection and LN biopsy : await results:   2: Pleural effusion : not much of chest xray  : but ct scan chest revelas more pleurl effusion on rt sdie then left:  send for chem and cultures   3: HLD: : per PMD  '4: ANCA ASSOCIATED VASCULITIS : sHE HAD FEVER:  RHEUMATOLOGY FOLLOWING ON ANTIBIOTICS:    DW THORACIC     11/18:    1: hx of lymphoma: DBLCL: S/P SURGERY RUL wedge resection and LN biopsy : await results:   2: Pleural effusion : not much of chest xray  : but ct scan chest reveals more pleural effusion on rt side then left: s/p thoracentesis : sent for cultures:  has exudative effusion ? malignancy ? lymphoma  3: HLD: : per PMD  '4: ANCA ASSOCIATED VASCULITIS : ssHE HAD FEVER:  RHEUMATOLOGY FOLLOWING ON ANTIBIOTICS: AFEBRILE IN LAST 24 HOURS AND IS ON CEFEPIME:     DW THORACIC AND ACP     11/20:      1: hx of lymphoma: DBLCL: S/P SURGERY RUL wedge resection and LN biopsy : await results:   2: Pleural effusion : not much of chest xray  : but ct scan chest reveals more pleural effusion on rt side then left: s/p thoracentesis : sent for cultures:  has exudative effusion ? malignancy ? lymphoma : await flow cyto   3: HLD: : per PMD  '4: ANCA ASSOCIATED VASCULITIS : ssHE HAD FEVER:  RHEUMATOLOGY FOLLOWING ON ANTIBIOTICS: AFEBRILE IN LAST 72 HOURS AND IS ON CEFEPIME:     DW  ACP     11/21:      1: hx of lymphoma: DBLCL: S/P SURGERY RUL wedge resection and LN biopsy : await results:   2: Pleural effusion : not much of chest xray  : but ct scan chest reveals more pleural effusion on rt side then left: s/p thoracentesis : sent for cultures:  has exudative effusion ? malignancy ? lymphoma : await flow cyto   3: HLD: : per PMD  '4: ANCA ASSOCIATED VASCULITIS : SHE HAD FEVER:  RHEUMATOLOGY FOLLOWING ON ANTIBIOTICS: AFEBRILE IN LAST 72 HOURS AND IS still off CEFEPIME: now  5: now with covid : on remdesivir:  : she is on  2 L of oxygen : but on record she did not desaturate too much      LEIGHA  ACP     11/22    1: hx of lymphoma: DBLCL: S/P SURGERY RUL wedge resection and LN biopsy : await results:   2: Pleural effusion : not much of chest xray  : but ct scan chest reveals more pleural effusion on rt side then left: s/p thoracentesis : sent for cultures:  has exudative effusion ? malignancy ? lymphoma : await flow cyto   3: HLD: : per PMD  '4: ANCA ASSOCIATED VASCULITIS : SHE HAD FEVER:  RHEUMATOLOGY FOLLOWING ON ANTIBIOTICS: AFEBRILE IN LAST 72 HOURS AND IS  off CEFEPIME: now  5: now with covid : on remdesivir:  : she is on  2 L of oxygen : but on record she did not desaturate too much  : she is on it for comfort:  leigha acp : to remove oxygen and see her real o2 sao2: HER D DIMER NEEDS TO BE CHECKED    leigha acp

## 2022-11-22 NOTE — CHART NOTE - NSCHARTNOTEFT_GEN_A_CORE
Pt now w/ symptomatic covid infection (sob w/ O2 requirements) being treated w/ Remdesevir. Will consider starting immunosuppression (such as steroid/cellcept) for lung isolated incidental findings of vasculitis (via path), once asymptomatic within 7 to 14 days of symptom resolution per ACR guidelines.     Falguni TR, Petros SR, Rafaela L, Apolinar RJ, Luiz LR, Chip BL, et al.  American College of Rheumatology Guidance for the Management of Rheumatic  Disease in Adult Patients During the COVID- 19 Pandemic: Version 1. Arthritis  Rheumatol 2020;72:1241–51.     Please call back once infection resolved  Will sign off for now. Please call/TEAMS if any questions.  Discussed with Attending Dr. Deny Pulliam DO  Rheumatology Fellow  Pager: [phone]  Available on TEAMS

## 2022-11-22 NOTE — PROGRESS NOTE ADULT - SUBJECTIVE AND OBJECTIVE BOX
Reports feeling more SOB starting earlier this morning  Saturating 92% on RA  Minimal cough    Vital Signs Last 24 Hrs  T(C): 36.3 (22 Nov 2022 08:19), Max: 37 (22 Nov 2022 05:00)  T(F): 97.3 (22 Nov 2022 08:19), Max: 98.6 (22 Nov 2022 05:00)  HR: 119 (22 Nov 2022 08:19) (87 - 119)  BP: 144/77 (22 Nov 2022 08:19) (132/50 - 152/70)  BP(mean): --  RR: 20 (22 Nov 2022 08:19) (18 - 24)  SpO2: 97% (22 Nov 2022 08:19) (92% - 97%)    I&O's Summary    11-21-22 @ 07:01  -  11-22-22 @ 07:00  --------------------------------------------------------  IN: 660 mL / OUT: 500 mL / NET: 160 mL        PHYSICAL EXAM:  GENERAL: NAD, thin-elderly, comfortable on room air  HEAD:  Atraumatic, Normocephalic  EYES: EOMI, PERRLA, conjunctiva and sclera clear  NECK: Supple, No JVD  CHEST/LUNG: mild decrease breath sounds bilaterally; No wheeze   HEART: Regular rate and rhythm; No murmurs, rubs, or gallops  ABDOMEN: Soft, Nontender, Nondistended; Bowel sounds present  Neuro: AAOx3, no focal weakness   EXTREMITIES:  2+ Peripheral Pulses, No clubbing, cyanosis, or edema      LABS:                        10.0   22.07 )-----------( 320      ( 22 Nov 2022 07:48 )             31.0     11-22    132<L>  |  104  |  13  ----------------------------<  196<H>  3.7   |  16<L>  |  0.49<L>    Ca    7.8<L>      22 Nov 2022 07:48  Phos  2.6     11-22  Mg     2.10     11-22    TPro  x   /  Alb  x   /  TBili  0.7  /  DBili  0.3  /  AST  x   /  ALT  x   /  AlkPhos  x   11-22      CAPILLARY BLOOD GLUCOSE                RADIOLOGY & ADDITIONAL TESTS:    Imaging Personally Reviewed:  [x] YES  [ ] NO    Will obtain old records:  [ ] YES  [x] NO

## 2022-11-22 NOTE — PROGRESS NOTE ADULT - SUBJECTIVE AND OBJECTIVE BOX
Date of Service: 11-22-22 @ 13:45    Patient is a 81y old  Female who presents with a chief complaint of RVATS, RUL Nodule Biopsy w/ IR marking (22 Nov 2022 11:42)      Any change in ROS: on 2 L of oxygen  now:     MEDICATIONS  (STANDING):  acetaminophen   IVPB .. 750 milliGRAM(s) IV Intermittent once  citalopram 10 milliGRAM(s) Oral daily  fluticasone propionate 50 MICROgram(s)/spray Nasal Spray 1 Spray(s) Both Nostrils two times a day  folic acid 1 milliGRAM(s) Oral daily  heparin   Injectable 5000 Unit(s) SubCutaneous every 12 hours  levalbuterol Inhalation 0.63 milliGRAM(s) Inhalation every 6 hours  lidocaine   4% Patch 1 Patch Transdermal daily  melatonin 3 milliGRAM(s) Oral at bedtime  metoprolol tartrate 25 milliGRAM(s) Oral two times a day  pantoprazole    Tablet 40 milliGRAM(s) Oral before breakfast  polyethylene glycol 3350 17 Gram(s) Oral daily  remdesivir  IVPB   IV Intermittent   remdesivir  IVPB 100 milliGRAM(s) IV Intermittent every 24 hours  senna 1 Tablet(s) Oral daily  sodium bicarbonate 650 milliGRAM(s) Oral three times a day    MEDICATIONS  (PRN):  acetaminophen     Tablet .. 650 milliGRAM(s) Oral every 6 hours PRN Mild Pain (1 - 3)  benzonatate 200 milliGRAM(s) Oral three times a day PRN Cough  guaiFENesin Oral Liquid (Sugar-Free) 200 milliGRAM(s) Oral every 6 hours PRN Cough  HYDROmorphone   Tablet 1 milliGRAM(s) Oral every 3 hours PRN Moderate-Severe Pain (4 - 10)  HYDROmorphone  Injectable 0.2 milliGRAM(s) IV Push every 3 hours PRN Severe Breakthrough Pain (7 - 10)  naloxone Injectable 0.1 milliGRAM(s) IV Push every 3 minutes PRN For ANY of the following changes in patient status:  A. RR LESS THAN 10 breaths per minute, B. Oxygen saturation LESS THAN 90%, C. Sedation score of 6  ondansetron Injectable 4 milliGRAM(s) IV Push every 6 hours PRN Nausea    Vital Signs Last 24 Hrs  T(C): 36.3 (22 Nov 2022 08:19), Max: 37 (22 Nov 2022 05:00)  T(F): 97.3 (22 Nov 2022 08:19), Max: 98.6 (22 Nov 2022 05:00)  HR: 119 (22 Nov 2022 08:19) (90 - 119)  BP: 144/77 (22 Nov 2022 08:19) (144/77 - 152/70)  BP(mean): --  RR: 20 (22 Nov 2022 08:19) (18 - 24)  SpO2: 97% (22 Nov 2022 08:19) (92% - 97%)    Parameters below as of 22 Nov 2022 08:19  Patient On (Oxygen Delivery Method): nasal cannula  O2 Flow (L/min): 2      I&O's Summary    21 Nov 2022 07:01  -  22 Nov 2022 07:00  --------------------------------------------------------  IN: 660 mL / OUT: 500 mL / NET: 160 mL          Physical Exam:   GENERAL: NAD, well-groomed, well-developed  HEENT: RANJIT/   Atraumatic, Normocephalic  ENMT: No tonsillar erythema, exudates, or enlargement; Moist mucous membranes, Good dentition, No lesions  NECK: Supple, No JVD, Normal thyroid  CHEST/LUNG: Clear to auscultaionn  CVS: Regular rate and rhythm; No murmurs, rubs, or gallops  GI: : Soft, Nontender, Nondistended; Bowel sounds present  NERVOUS SYSTEM:  Alert & Oriented X3  EXTREMITIES:  - edema  LYMPH: No lymphadenopathy noted  SKIN: No rashes or lesions  ENDOCRINOLOGY: No Thyromegaly  PSYCH: Appropriate    Labs:  17                            10.0   22.07 )-----------( 320      ( 22 Nov 2022 07:48 )             31.0                         9.4    16.19 )-----------( 250      ( 21 Nov 2022 17:12 )             29.5                         6.7    12.82 )-----------( 278      ( 21 Nov 2022 04:43 )             21.6                         7.8    11.47 )-----------( 213      ( 20 Nov 2022 06:04 )             25.1                         7.3    12.83 )-----------( 211      ( 19 Nov 2022 05:35 )             23.6     11-22    132<L>  |  104  |  13  ----------------------------<  196<H>  3.7   |  16<L>  |  0.49<L>  11-21    135  |  103  |  18  ----------------------------<  95  3.4<L>   |  18<L>  |  0.62  11-20    133<L>  |  104  |  15  ----------------------------<  115<H>  3.6   |  21<L>  |  0.55  11-19    134<L>  |  102  |  15  ----------------------------<  104<H>  3.9   |  22  |  0.53    Ca    7.8<L>      22 Nov 2022 07:48  Ca    7.8<L>      21 Nov 2022 04:43  Phos  2.6     11-22  Phos  2.1     11-21  Mg     2.10     11-22  Mg     1.80     11-21    TPro  x   /  Alb  x   /  TBili  0.7  /  DBili  0.3  /  AST  x   /  ALT  x   /  AlkPhos  x   11-22    CAPILLARY BLOOD GLUCOSE        rad< from: Xray Chest 1 View- PORTABLE-Routine (Xray Chest 1 View- PORTABLE-Routine in AM.) (11.18.22 @ 08:26) >    ACC: [id number] EXAM:  XR CHEST PORTABLE ROUTINE 1V                          PROCEDURE DATE:  11/18/2022          INTERPRETATION:  CLINICAL INFORMATION: Follow-up post thoracotomy with   effusion.    TIME OF EXAMINATION: November 18 at 6:28 AM    EXAM: Portable chest    FINDINGS:  The lungs are free of focal abnormalities. Hazy lung bases has the   appearance of trace layering effusions. Left-sided chemotherapy port in   place. No pneumothorax.        COMPARISON: November 17        IMPRESSION: Clear lungs with bilateral trace pleural effusions.    --- End of Report ---    < end of copied text >  < from: Xray Chest 1 View- PORTABLE-Urgent (Xray Chest 1 View- PORTABLE-Urgent .) (11.17.22 @ 15:53) >    ACC: [id number] EXAM:  XR CHEST PORTABLE URGENT 1V                          PROCEDURE DATE:  11/17/2022          INTERPRETATION:  INDICATION: S/P tap - assess for pneumothorax.    COMPARISON: Earlier 11/17/22 exam - done 6:33AM    Technique: AP radiograph of the chest    FINDINGS:  Left-sided MediPort with tip at AVC/RA junction.  Heart/Vascular: Difficult to assess heart size on this AP film - the   heart is similar in appearance.  Pulmonary: Interval improvement in interstitial edema. Interval decrease   in right pleural effusion. No pneumothorax.  Bones: No acute bony finding.    Impression:    Interval improvement in interstitial edema.  Decrease in right pleural effusion.  No pneumothorax.      --- End of Report ---            ALYSSA FOSTER MD; Attending Radiologist  This document has been electronically signed. Nov 18 2022 10:05AM    < end of copied text >          Fluid Source --  Albumin, Fluid1.5 g/dL  Glucose, [id number] mg/dL  Protein total, Fluid2.9 g/dL  Lacatate Dehydrogenase, [id number] U/L  pH, Fluid7.7  Cytopathology-Non Gyn Report--        RECENT CULTURES:  11-18 @ 09:45 .Blood Blood-Peripheral                No growth to date.    11-18 @ 06:00 .Blood Blood-Peripheral                No growth to date.    11-17 @ 16:04 Pleural Fl Pleural Fluid       polymorphonuclear leukocytes seen  No organisms seen  by cytocentrifuge           No growth          RESPIRATORY CULTURES:          Studies  Chest X-RAY  CT SCAN Chest   Venous Dopplers: LE:   CT Abdomen  Others

## 2022-11-22 NOTE — CHART NOTE - NSCHARTNOTEFT_GEN_A_CORE
**Late entry**  [employer] MEDICINE COVERAGE - Medicine Subsequent Hospital Care Note    CC: tachypnea  HPI/Subjective: Pt seen and examined at bedside noted w/ tachypnea. Pt states she has been having trouble breathing ever since the hospital but noticed it worsened today ever since having been diagnosed with COVID. Admits to generalized weakness.    ROS:  Denies fever, chills, diaphoresis, malaise, night sweats, headache, changes in vision, dizziness, cough, sputum production, wheezing, hemoptysis, chest pain, palpitations, PND, dysphagia, new abdominal pain, nausea, vomiting, diarrhea, constipation, melena, hematochezia, dysuria, increased urinary frequency/urgency, hematuria, nocturia, numbness/weakness/tingling, any other complaints.    Vital Signs Last 24 Hrs  T(C): 36.3 (22 @ 08:19), Max: 37 (22 @ 05:00)  T(F): 97.3 (22 @ 08:19), Max: 98.6 (22 @ 05:00)  HR: 115 (22 @ 17:00) (90 - 119)  BP: 149/69 (22 @ 17:00) (144/77 - 152/70)  RR: 20 (22 @ 17:00) (18 - 24)  SpO2: 95% (22 @ 17:00) (92% - 97%) on (O2)    PHYSICAL EXAM:  Constitutional: NAD, well-developed, fragile appearing.  Ears, nose, Mouth, and Throat: normal external ears and nose, normal hearing, moist oral mucosa  Eyes: normal conjunctiva, EOMI, PEERL  Neck: supple, no JVD  Respiratory: +Tachypnea. B/l rhonchi. No wheezes or rales. Normal respiratory effort  Cardiovascular: +tachycardia, positive S1 and S2, no murmurs, rubs or gallops, no edema, 2+ peripheral pulses  Gastrointestinal: soft, nontender, nondistended, +bowel sounds, no hernia  Skin: warm, dry, no rash  Neurologic: sensation grossly intact, CN grossly intact, non-focal exam  Musculoskeletal: no clubbing, no cyanosis, no joint swelling  Psychiatric: A&Ox3, appropriate mood, affect    LABS:                        10.0   22.07 )-----------( 320      ( 2022 07:48 )             31.0         132<L>  |  104  |  13  ----------------------------<  196<H>  3.7   |  16<L>  |  0.49<L>    Ca    7.8<L>      2022 07:48  Phos  2.6       Mg     2.10         TPro  x   /  Alb  x   /  TBili  0.7  /  DBili  0.3  /  AST  x   /  ALT  x   /  AlkPhos  x       PT/INR: PT: 14.8 sec | INR: 1.27 ratio (22 @ 22:00)  PTT: 38.7 sec (22 @ 22:00)  PT/INR: PT: 17.1 sec | INR: 1.47 ratio (11-10-22 @ 07:44)  PTT: 36.9 sec (11-10-22 @ 07:44)  PT/INR: PT: 18.6 sec | INR: 1.60 ratio (22 @ 14:55)  PTT: 34.8 sec (22 @ 14:55)    CARDIAC ENZYMES    Creatine Kinase, Serum: 16 U/L (22 @ 22:00)          Serum Pro-Brain Natriuretic Peptide:   D-Dimer Assay:     Urinanalysis Basic (22 @ 18:05):  Color: Judy / Appearance: Clear / S.044 / pH: --  Gluc: -- / Ketone: Small / Bili: Small / Urobili: >12 mg/dL  Blood: -- / Protein: 100 mg/dL / Nitrite: Negative  Leuk Esterase: Negative / RBC: 12 / WBC: 11  Sq Epi: -- / Non Sq Epi: -- / Bacteria: Few      Blood Gas Venous (22 @ 18:05):  pH: 7.32 | HCO3: 17 | pCO2: 33 | pO2: 87 | Lactate: 2.3      Blood Gas Arterial (22 @ 18:05):      CAPILLARY BLOOD GLUCOSE:      COVID PCR:  NotDetec (22 @ 16:30)      RADIOLOGY:    MEDICATIONS  (STANDING):  acetaminophen   IVPB .. 750 milliGRAM(s) IV Intermittent once  citalopram 10 milliGRAM(s) Oral daily  fluticasone propionate 50 MICROgram(s)/spray Nasal Spray 1 Spray(s) Both Nostrils two times a day  folic acid 1 milliGRAM(s) Oral daily  heparin   Injectable 5000 Unit(s) SubCutaneous every 12 hours  levalbuterol Inhalation 0.63 milliGRAM(s) Inhalation every 6 hours  lidocaine   4% Patch 1 Patch Transdermal daily  melatonin 3 milliGRAM(s) Oral at bedtime  metoprolol tartrate 25 milliGRAM(s) Oral two times a day  pantoprazole    Tablet 40 milliGRAM(s) Oral before breakfast  polyethylene glycol 3350 17 Gram(s) Oral daily  remdesivir  IVPB   IV Intermittent   remdesivir  IVPB 100 milliGRAM(s) IV Intermittent every 24 hours  senna 1 Tablet(s) Oral daily  sodium bicarbonate 650 milliGRAM(s) Oral three times a day    MEDICATIONS  (PRN):  acetaminophen     Tablet .. 650 milliGRAM(s) Oral every 6 hours PRN Mild Pain (1 - 3)  benzonatate 200 milliGRAM(s) Oral three times a day PRN Cough  guaiFENesin Oral Liquid (Sugar-Free) 200 milliGRAM(s) Oral every 6 hours PRN Cough  HYDROmorphone   Tablet 1 milliGRAM(s) Oral every 3 hours PRN Moderate-Severe Pain (4 - 10)  HYDROmorphone  Injectable 0.2 milliGRAM(s) IV Push every 3 hours PRN Severe Breakthrough Pain (7 - 10)  naloxone Injectable 0.1 milliGRAM(s) IV Push every 3 minutes PRN For ANY of the following changes in patient status:  A. RR LESS THAN 10 breaths per minute, B. Oxygen saturation LESS THAN 90%, C. Sedation score of 6  ondansetron Injectable 4 milliGRAM(s) IV Push every 6 hours PRN Nausea    I&O's Summary    2022 07:01  -  2022 07:00  --------------------------------------------------------  IN: 660 mL / OUT: 500 mL / NET: 160 mL      I reviewed the above labs, radiology, medications, tests, telemetry, and EKG interpretation.    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 S/P VATS RUL wedge resection and LN biopsy on 11/10. Path results favoring vasculitis; no evidence for lymphoma. Rheum following for ANCA vasculitis. C/b Nosocomial COVID . Pt now w/ tachypnea likely 2/2 COVID.    1. Tachypnea 2/2 COVID- Xopenex ordered however per respiratory unable to give treatment due to COVID Status. CXR ordered w/ Moderate interstitial edema. Trace bilateral pleural effusions with bibasilar atelectasis, decreased from prior x-ray. Incentive spirometer ordered for this & reordered PT to see her daily to prevent deconditioning. Inflammatory markers ordered. Ddimer ordered for AM. Pt had recent CTA chest that was neg for PE and recent b/l dopplers that were negative for DVT. VBG done w/ likely compensated metabolic acidosis from respiratory alkalosis. PO Sodium bicarbonate started.  Pulse oximetry reviewed - pt has been satting well on RA maintaining O2 sat >92%. She was placed on 2L NC for comfort. Assessed her O2 while walking with PT off O2 and pt remained above 93%.  Remdesivir extended for pt to receive total 5 day course instead of 3 days. Spoke with ID who recommended hold off on Decadron.  Discussed above plan with Dr. Hicks and Infectious Disease.  2. Tachycardia- tele reviewed HR has been in 120s this AM. EKG done w/ sinus tachycardia. Cardiology has been on board for sinus tachycardia and presumed her tachycardia to be from her fevers therefore will need to treat underlying COVID infxn (being treated w/ Remdesivir). Metoprolol was already started --> Metoprolol 5mg IVP given and HR improved to low 100s shortly after. Pts HR has been 100-105s.  3. ANCA Vasculitis- Rheum consulted for ?possibly starting steroids. Will f/u rheum recs.    - Clinical findings, labs, tests, telemetry, and ekg reviewed with attending. Will monitor patient closely.       high complexity ( >60 min)

## 2022-11-23 NOTE — PROGRESS NOTE ADULT - ASSESSMENT
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 Patient is admitted to be optimized for her surgical procedure.    Plan: OR tomorrow for RVATS, RUL Nodule Biopsy w/ IR Marking.    1: hx of lymphoma: DBLCL  2: Pleural effusion :  3: HLD:   '4: ANCA ASSOCIATED VASCULITIS      11/17:    1: hx of lymphoma: DBLCL: S/P SURGERY RUL wedge resection and LN biopsy : await results:   2: Pleural effusion : not much of chest xray  : but ct scan chest revelas more pleurl effusion on rt sdie then left:  send for chem and cultures   3: HLD: : per PMD  '4: ANCA ASSOCIATED VASCULITIS : sHE HAD FEVER:  RHEUMATOLOGY FOLLOWING ON ANTIBIOTICS:    DW THORACIC     11/18:    1: hx of lymphoma: DBLCL: S/P SURGERY RUL wedge resection and LN biopsy : await results:   2: Pleural effusion : not much of chest xray  : but ct scan chest reveals more pleural effusion on rt side then left: s/p thoracentesis : sent for cultures:  has exudative effusion ? malignancy ? lymphoma  3: HLD: : per PMD  '4: ANCA ASSOCIATED VASCULITIS : ssHE HAD FEVER:  RHEUMATOLOGY FOLLOWING ON ANTIBIOTICS: AFEBRILE IN LAST 24 HOURS AND IS ON CEFEPIME:     DW THORACIC AND ACP     11/20:      1: hx of lymphoma: DBLCL: S/P SURGERY RUL wedge resection and LN biopsy : await results:   2: Pleural effusion : not much of chest xray  : but ct scan chest reveals more pleural effusion on rt side then left: s/p thoracentesis : sent for cultures:  has exudative effusion ? malignancy ? lymphoma : await flow cyto   3: HLD: : per PMD  '4: ANCA ASSOCIATED VASCULITIS : ssHE HAD FEVER:  RHEUMATOLOGY FOLLOWING ON ANTIBIOTICS: AFEBRILE IN LAST 72 HOURS AND IS ON CEFEPIME:     DW  ACP     11/21:      1: hx of lymphoma: DBLCL: S/P SURGERY RUL wedge resection and LN biopsy : await results:   2: Pleural effusion : not much of chest xray  : but ct scan chest reveals more pleural effusion on rt side then left: s/p thoracentesis : sent for cultures:  has exudative effusion ? malignancy ? lymphoma : await flow cyto   3: HLD: : per PMD  '4: ANCA ASSOCIATED VASCULITIS : SHE HAD FEVER:  RHEUMATOLOGY FOLLOWING ON ANTIBIOTICS: AFEBRILE IN LAST 72 HOURS AND IS still off CEFEPIME: now  5: now with covid : on remdesivir:  : she is on  2 L of oxygen : but on record she did not desaturate too much      DW  ACP     11/22    1: hx of lymphoma: DBLCL: S/P SURGERY RUL wedge resection and LN biopsy : await results:   2: Pleural effusion : not much of chest xray  : but ct scan chest reveals more pleural effusion on rt side then left: s/p thoracentesis : sent for cultures:  has exudative effusion ? malignancy ? lymphoma : await flow cyto   3: HLD: : per PMD  '4: ANCA ASSOCIATED VASCULITIS : SHE HAD FEVER:  RHEUMATOLOGY FOLLOWING ON ANTIBIOTICS: AFEBRILE IN LAST 72 HOURS AND IS  off CEFEPIME: now  5: now with covid : on remdesivir:  : she is on  2 L of oxygen : but on record she did not desaturate too much  : she is on it for comfort:  demi acp : to remove oxygen and see her real o2 sao2: HER D DIMER NEEDS TO BE CHECKED    dw acp    11/23:      1: hx of lymphoma: DBLCL: S/P SURGERY RUL wedge resection and LN biopsy : await results:   2: Pleural effusion : not much of chest xray  : but ct scan chest reveals more pleural effusion on rt side then left: s/p thoracentesis : sent for cultures:  has exudative effusion ? malignancy ? lymphoma : await flow cyto   3: HLD: : per PMD  '4: ANCA ASSOCIATED VASCULITIS : SHE HAD FEVER:  RHEUMATOLOGY FOLLOWING ON ANTIBIOTICS: AFEBRILE IN LAST 72 HOURS AND IS  off CEFEPIME: now  5: now with covid : on remdesivir:  : she is on  2 L of oxygen : but on record she did not desaturate too much  : she is on it for comfort:  demi acp : to remove oxygen and see her real o2 sao2: HER D DIMER is high   ut she already had negative pe:     demi acp

## 2022-11-23 NOTE — PROGRESS NOTE ADULT - SUBJECTIVE AND OBJECTIVE BOX
CARDIOLOGY FOLLOW UP - Dr. Hooker  Date of Service: 11/23/2022  CC: no events    Review of Systems:  Constitutional: No fever, weight loss, or fatigue  Respiratory: No cough, wheezing, or hemoptysis, no shortness of breath  Cardiovascular: No chest pain, palpitations, passing out, dizziness, or leg swelling  Gastrointestinal: No abd or epigastric pain. No nausea, vomiting, or hematemesis; no diarrhea or consiptaiton, no melena or hematochezia  Vascular: No edema     TELEMETRY:    PHYSICAL EXAM:  T(C): 36.3 (11-23-22 @ 09:40), Max: 36.3 (11-22-22 @ 21:10)  HR: 99 (11-23-22 @ 09:40) (86 - 115)  BP: 147/70 (11-23-22 @ 09:40) (147/70 - 152/64)  RR: 17 (11-23-22 @ 09:40) (17 - 20)  SpO2: 97% (11-23-22 @ 09:40) (95% - 97%)  Wt(kg): --  I&O's Summary      Appearance: Normal	  Cardiovascular: Normal S1 S2,RRR, No JVD, No murmurs  Respiratory: Lungs clear to auscultation	  Gastrointestinal:  Soft, Non-tender, + BS	  Extremities: Normal range of motion, No clubbing, cyanosis or edema  Vascular: Peripheral pulses palpable 2+ bilaterally       Home Medications:  citalopram 10 mg oral tablet: 1 tab(s) orally once a day (04 Nov 2022 15:16)  ezetimibe 10 mg oral tablet: 1 tab(s) orally once a day (04 Nov 2022 15:16)  folic acid 1 mg oral tablet: 1 tab(s) orally once a day (04 Nov 2022 15:16)  omeprazole 20 mg oral delayed release capsule: 1 cap(s) orally prn (04 Nov 2022 15:16)  Sodium Chloride 1 g oral tablet: daily (04 Nov 2022 15:16)        MEDICATIONS  (STANDING):  acetaminophen   IVPB .. 750 milliGRAM(s) IV Intermittent once  citalopram 10 milliGRAM(s) Oral daily  fluticasone propionate 50 MICROgram(s)/spray Nasal Spray 1 Spray(s) Both Nostrils two times a day  folic acid 1 milliGRAM(s) Oral daily  heparin   Injectable 5000 Unit(s) SubCutaneous every 12 hours  levalbuterol Inhalation 0.63 milliGRAM(s) Inhalation every 6 hours  lidocaine   4% Patch 1 Patch Transdermal daily  melatonin 3 milliGRAM(s) Oral at bedtime  metoprolol tartrate 25 milliGRAM(s) Oral two times a day  pantoprazole    Tablet 40 milliGRAM(s) Oral before breakfast  polyethylene glycol 3350 17 Gram(s) Oral daily  remdesivir  IVPB 100 milliGRAM(s) IV Intermittent <User Schedule>  senna 1 Tablet(s) Oral daily  sodium bicarbonate 650 milliGRAM(s) Oral three times a day        EKG:  RADIOLOGY:  DIAGNOSTIC TESTING:  [ ] Echocardiogram:  [ ] Catherterization:  [ ] Stress Test:  OTHER:     LABS:	 	                          10.1   11.71 )-----------( 253      ( 23 Nov 2022 06:07 )             31.2     11-23    137  |  106  |  16  ----------------------------<  118<H>  3.9   |  19<L>  |  0.46<L>    Ca    7.8<L>      23 Nov 2022 06:07  Phos  1.9     11-23  Mg     2.30     11-23    TPro  6.1  /  Alb  2.6<L>  /  TBili  0.6  /  DBili  x   /  AST  18  /  ALT  6   /  AlkPhos  73  11-23          CARDIAC MARKERS:

## 2022-11-23 NOTE — PROGRESS NOTE ADULT - SUBJECTIVE AND OBJECTIVE BOX
Date of Service: 11-23-22 @ 16:14    Patient is a 81y old  Female who presents with a chief complaint of RVATS, RUL Nodule Biopsy w/ IR marking (23 Nov 2022 15:40)      Any change in ROS:  sheis alert and awake:  no sob; on 2 L ofoxygen     MEDICATIONS  (STANDING):  acetaminophen   IVPB .. 750 milliGRAM(s) IV Intermittent once  citalopram 10 milliGRAM(s) Oral daily  fluticasone propionate 50 MICROgram(s)/spray Nasal Spray 1 Spray(s) Both Nostrils two times a day  folic acid 1 milliGRAM(s) Oral daily  furosemide   Injectable 20 milliGRAM(s) IV Push once  heparin   Injectable 5000 Unit(s) SubCutaneous every 12 hours  levalbuterol Inhalation 0.63 milliGRAM(s) Inhalation every 6 hours  lidocaine   4% Patch 1 Patch Transdermal daily  melatonin 3 milliGRAM(s) Oral at bedtime  metoprolol tartrate 25 milliGRAM(s) Oral two times a day  pantoprazole    Tablet 40 milliGRAM(s) Oral before breakfast  polyethylene glycol 3350 17 Gram(s) Oral daily  remdesivir  IVPB 100 milliGRAM(s) IV Intermittent <User Schedule>  senna 1 Tablet(s) Oral daily  sodium bicarbonate 650 milliGRAM(s) Oral three times a day  sodium phosphate 15 milliMole(s)/250 mL IVPB 15 milliMole(s) IV Intermittent once    MEDICATIONS  (PRN):  acetaminophen     Tablet .. 650 milliGRAM(s) Oral every 6 hours PRN Mild Pain (1 - 3)  benzonatate 200 milliGRAM(s) Oral three times a day PRN Cough  guaiFENesin Oral Liquid (Sugar-Free) 200 milliGRAM(s) Oral every 6 hours PRN Cough  HYDROmorphone   Tablet 1 milliGRAM(s) Oral every 3 hours PRN Moderate-Severe Pain (4 - 10)  HYDROmorphone  Injectable 0.2 milliGRAM(s) IV Push every 3 hours PRN Severe Breakthrough Pain (7 - 10)  naloxone Injectable 0.1 milliGRAM(s) IV Push every 3 minutes PRN For ANY of the following changes in patient status:  A. RR LESS THAN 10 breaths per minute, B. Oxygen saturation LESS THAN 90%, C. Sedation score of 6  ondansetron Injectable 4 milliGRAM(s) IV Push every 6 hours PRN Nausea    Vital Signs Last 24 Hrs  T(C): 36.3 (23 Nov 2022 12:50), Max: 36.3 (22 Nov 2022 21:10)  T(F): 97.3 (23 Nov 2022 12:50), Max: 97.3 (22 Nov 2022 21:10)  HR: 98 (23 Nov 2022 12:50) (86 - 115)  BP: 112/54 (23 Nov 2022 12:50) (112/54 - 152/64)  BP(mean): --  RR: 17 (23 Nov 2022 12:50) (17 - 20)  SpO2: 100% (23 Nov 2022 12:50) (95% - 100%)    Parameters below as of 23 Nov 2022 12:50  Patient On (Oxygen Delivery Method): nasal cannula  O2 Flow (L/min): 2      I&O's Summary        Physical Exam:   GENERAL: NAD, well-groomed, well-developed  HEENT: RANJIT/   Atraumatic, Normocephalic  ENMT: No tonsillar erythema, exudates, or enlargement; Moist mucous membranes, Good dentition, No lesions  NECK: Supple, No JVD, Normal thyroid  CHEST/LUNG: jean-claude cracles  CVS: Regular rate and rhythm; No murmurs, rubs, or gallops  GI: : Soft, Nontender, Nondistended; Bowel sounds present  NERVOUS SYSTEM:  Alert & Oriented X3  EXTREMITIES:  -edema  LYMPH: No lymphadenopathy noted  SKIN: No rashes or lesions  ENDOCRINOLOGY: No Thyromegaly  PSYCH: Appropriate    Labs:  17                            10.1   11.71 )-----------( 253      ( 23 Nov 2022 06:07 )             31.2                         10.0   22.07 )-----------( 320      ( 22 Nov 2022 07:48 )             31.0                         9.4    16.19 )-----------( 250      ( 21 Nov 2022 17:12 )             29.5                         6.7    12.82 )-----------( 278      ( 21 Nov 2022 04:43 )             21.6                         7.8    11.47 )-----------( 213      ( 20 Nov 2022 06:04 )             25.1     11-23    137  |  106  |  16  ----------------------------<  118<H>  3.9   |  19<L>  |  0.46<L>  11-22    132<L>  |  104  |  13  ----------------------------<  196<H>  3.7   |  16<L>  |  0.49<L>  11-21    135  |  103  |  18  ----------------------------<  95  3.4<L>   |  18<L>  |  0.62  11-20    133<L>  |  104  |  15  ----------------------------<  115<H>  3.6   |  21<L>  |  0.55    Ca    7.8<L>      23 Nov 2022 06:07  Ca    7.8<L>      22 Nov 2022 07:48  Phos  1.9     11-23  Phos  2.6     11-22  Mg     2.30     11-23  Mg     2.10     11-22    TPro  6.1  /  Alb  2.6<L>  /  TBili  0.6  /  DBili  x   /  AST  18  /  ALT  6   /  AlkPhos  73  11-23  TPro  x   /  Alb  x   /  TBili  0.7  /  DBili  0.3  /  AST  x   /  ALT  x   /  AlkPhos  x   11-22    CAPILLARY BLOOD GLUCOSE          LIVER FUNCTIONS - ( 23 Nov 2022 06:07 )  Alb: 2.6 g/dL / Pro: 6.1 g/dL / ALK PHOS: 73 U/L / ALT: 6 U/L / AST: 18 U/L / GGT: x               D-Dimer Assay, Quantitative: 2286 ng/mL DDU (11-23 @ 06:07)  Procalcitonin, Serum: 0.24 ng/mL (11-22 @ 07:48)  Fluid Source --  Albumin, Fluid1.5 g/dL  Glucose, Vxxsy348 mg/dL  Protein total, Fluid2.9 g/dL  Lacatate Dehydrogenase, Azkom561 U/L  pH, Fluid7.7  Cytopathology-Non Gyn Report--        RECENT CULTURES:  11-18 @ 09:45 .Blood Blood-Peripheral         < from: Xray Chest 1 View-PORTABLE IMMEDIATE (Xray Chest 1 View-PORTABLE IMMEDIATE .) (11.22.22 @ 16:18) >    PROCEDURE DATE:  11/22/2022          INTERPRETATION:  EXAMINATION: XR CHEST IMMEDIATE    CLINICAL INDICATION: cough r/o pna    TECHNIQUE: Single frontal, portable view of the chest was obtained.    COMPARISON: Chest x-ray 11/18/2022.    FINDINGS:  Implantable port overlying the left chest wall with its tip terminating   in the upper right atrium.  Left upper lung field chain sutures are noted.  The heart size cannot be accurately assessed in this projection.  Bilateral interstitial and bibasilar hazy opacities.  There is no pneumothorax.  No acute bony abnormality.    IMPRESSION:  Bilateral interstitial opacities.  Bibasilar hazy opacities, compatible with small bilateral effusions   and/or bibasilar subsegmental atelectasis. Infectious etiology cannot be   ruled out.    --- End of Report ---          KATHY FERGUSON MD; Resident Radiologist  This document has been electronically signed.  ALEXA ZABALA MD; AttendingInterventional Radiologist  This document has been electronically signed. Nov 23 2022 12:47PM    < end of copied text >         No Growth Final    11-18 @ 06:00 .Blood Blood-Peripheral                No Growth Final    11-17 @ 16:04 Pleural Fl Pleural Fluid       polymorphonuclear leukocytes seen  No organisms seen  by cytocentrifuge           No growth at 5 days          RESPIRATORY CULTURES:          Studies  Chest X-RAY  CT SCAN Chest   Venous Dopplers: LE:   CT Abdomen  Others

## 2022-11-23 NOTE — PROGRESS NOTE ADULT - SUBJECTIVE AND OBJECTIVE BOX
[facility], Division of Infectious Diseases  ANDREW Rodríguez Y. Patel, S. Shah, G. Bebeto  [phone]  ([phone] - weekdays after 5pm and weekends)    Name: BETTIE PRATER  Age/Gender: 81y Female  MRN: [id number]    Interval History:  Patient seen this morning.   Notes reviewed.   Afebrile.   states she feels better today    Allergies: codeine (Nausea)  doxycycline (Nausea)  penicillin (Hives)      Objective:  Vitals:   T(F): 97.3 (11-23-22 @ 09:40), Max: 97.3 (11-22-22 @ 21:10)  HR: 99 (11-23-22 @ 09:40) (86 - 115)  BP: 147/70 (11-23-22 @ 09:40) (147/70 - 152/64)  RR: 17 (11-23-22 @ 09:40) (17 - 20)  SpO2: 97% (11-23-22 @ 09:40) (95% - 97%)  Physical Examination:  General: no acute distress  HEENT: anicteric, NC  Cardio: normal rate, L chest mediport  Resp: clear to auscultation anteriorly  Abd: soft, NT, ND  Ext: no LE edema  Skin: warm, dry    Laboratory Studies:  CBC:                       10.1   11.71 )-----------( 253      ( 23 Nov 2022 06:07 )             31.2     WBC Trend:  11.71 11-23-22 @ 06:07  22.07 11-22-22 @ 07:48  16.19 11-21-22 @ 17:12  12.82 11-21-22 @ 04:43  11.47 11-20-22 @ 06:04  12.83 11-19-22 @ 05:35  18.10 11-18-22 @ 05:15  12.35 11-17-22 @ 05:10    CMP: 11-23    137  |  106  |  16  ----------------------------<  118<H>  3.9   |  19<L>  |  0.46<L>    Ca    7.8<L>      23 Nov 2022 06:07  Phos  1.9     11-23  Mg     2.30     11-23    TPro  6.1  /  Alb  2.6<L>  /  TBili  0.6  /  DBili  x   /  AST  18  /  ALT  6   /  AlkPhos  73  11-23      LIVER FUNCTIONS - ( 23 Nov 2022 06:07 )  Alb: 2.6 g/dL / Pro: 6.1 g/dL / ALK PHOS: 73 U/L / ALT: 6 U/L / AST: 18 U/L / GGT: x               Microbiology: reviewed     Culture - Blood (collected 11-18-22 @ 09:45)  Source: .Blood Blood-Peripheral  Preliminary Report (11-19-22 @ 13:02):    No growth to date.    Culture - Blood (collected 11-18-22 @ 06:00)  Source: .Blood Blood-Peripheral  Preliminary Report (11-19-22 @ 13:02):    No growth to date.    Culture - Body Fluid with Gram Stain (collected 11-17-22 @ 16:04)  Source: Pleural Fl Pleural Fluid  Gram Stain (11-17-22 @ 22:06):    polymorphonuclear leukocytes seen    No organisms seen    by cytocentrifuge  Final Report (11-22-22 @ 18:19):    No growth at 5 days    Culture - Blood (collected 11-14-22 @ 23:17)  Source: .Blood Blood-Peripheral  Final Report (11-20-22 @ 07:00):    No Growth Final    Culture - Blood (collected 11-14-22 @ 21:40)  Source: .Blood Blood-Peripheral  Final Report (11-20-22 @ 04:00):    No Growth Final        Radiology: reviewed     Medications:  acetaminophen     Tablet .. 650 milliGRAM(s) Oral every 6 hours PRN  acetaminophen   IVPB .. 750 milliGRAM(s) IV Intermittent once  benzonatate 200 milliGRAM(s) Oral three times a day PRN  citalopram 10 milliGRAM(s) Oral daily  fluticasone propionate 50 MICROgram(s)/spray Nasal Spray 1 Spray(s) Both Nostrils two times a day  folic acid 1 milliGRAM(s) Oral daily  guaiFENesin Oral Liquid (Sugar-Free) 200 milliGRAM(s) Oral every 6 hours PRN  heparin   Injectable 5000 Unit(s) SubCutaneous every 12 hours  HYDROmorphone   Tablet 1 milliGRAM(s) Oral every 3 hours PRN  HYDROmorphone  Injectable 0.2 milliGRAM(s) IV Push every 3 hours PRN  levalbuterol Inhalation 0.63 milliGRAM(s) Inhalation every 6 hours  lidocaine   4% Patch 1 Patch Transdermal daily  melatonin 3 milliGRAM(s) Oral at bedtime  metoprolol tartrate 25 milliGRAM(s) Oral two times a day  naloxone Injectable 0.1 milliGRAM(s) IV Push every 3 minutes PRN  ondansetron Injectable 4 milliGRAM(s) IV Push every 6 hours PRN  pantoprazole    Tablet 40 milliGRAM(s) Oral before breakfast  polyethylene glycol 3350 17 Gram(s) Oral daily  remdesivir  IVPB 100 milliGRAM(s) IV Intermittent <User Schedule>  senna 1 Tablet(s) Oral daily  sodium bicarbonate 650 milliGRAM(s) Oral three times a day    Antimicrobials:  remdesivir  IVPB 100 milliGRAM(s) IV Intermittent <User Schedule>

## 2022-11-23 NOTE — PROGRESS NOTE ADULT - SUBJECTIVE AND OBJECTIVE BOX
[facility]   MD RONEL FRIAS MD KRISTINE SOLTANPOUR, DO ANGELA WONG, PA    TEL:  OFFICE: [phone]  From 5pm-7am Answering Service [phone]    -- RENAL FOLLOW UP NOTE ---Date of Service 11-23-22 @ 13:54    Patient is a 81y old  Female who presents with a chief complaint of RVATS, RUL Nodule Biopsy w/ IR marking (23 Nov 2022 13:11)      Patient seen and examined at bedside. No chest pain/sob    VITALS:  T(F): 97.3 (11-23-22 @ 12:50), Max: 97.3 (11-22-22 @ 21:10)  HR: 98 (11-23-22 @ 12:50)  BP: 112/54 (11-23-22 @ 12:50)  RR: 17 (11-23-22 @ 12:50)  SpO2: 100% (11-23-22 @ 12:50)  Wt(kg): --        PHYSICAL EXAM:  General: NAD  Neck: No JVD  Respiratory: b/l wheeze  Cardiovascular: S1, S2, RRR  Gastrointestinal: BS+, soft, NT/ND  Extremities: No peripheral edema    Hospital Medications:   MEDICATIONS  (STANDING):  acetaminophen   IVPB .. 750 milliGRAM(s) IV Intermittent once  citalopram 10 milliGRAM(s) Oral daily  fluticasone propionate 50 MICROgram(s)/spray Nasal Spray 1 Spray(s) Both Nostrils two times a day  folic acid 1 milliGRAM(s) Oral daily  heparin   Injectable 5000 Unit(s) SubCutaneous every 12 hours  levalbuterol Inhalation 0.63 milliGRAM(s) Inhalation every 6 hours  lidocaine   4% Patch 1 Patch Transdermal daily  melatonin 3 milliGRAM(s) Oral at bedtime  metoprolol tartrate 25 milliGRAM(s) Oral two times a day  pantoprazole    Tablet 40 milliGRAM(s) Oral before breakfast  polyethylene glycol 3350 17 Gram(s) Oral daily  remdesivir  IVPB 100 milliGRAM(s) IV Intermittent <User Schedule>  senna 1 Tablet(s) Oral daily  sodium bicarbonate 650 milliGRAM(s) Oral three times a day      LABS:  11-23    137  |  106  |  16  ----------------------------<  118<H>  3.9   |  19<L>  |  0.46<L>    Ca    7.8<L>      23 Nov 2022 06:07  Phos  1.9     11-23  Mg     2.30     11-23    TPro  6.1  /  Alb  2.6<L>  /  TBili  0.6  /  DBili      /  AST  18  /  ALT  6   /  AlkPhos  73  11-23    Creatinine Trend: 0.46 <--, 0.49 <--, 0.62 <--, 0.55 <--, 0.53 <--, 0.60 <--, 0.55 <--    Albumin, Serum: 2.6 g/dL (11-23 @ 06:07)  Phosphorus Level, Serum: 1.9 mg/dL (11-23 @ 06:07)                              10.1   11.71 )-----------( 253      ( 23 Nov 2022 06:07 )             31.2     Urine Studies:  Urinalysis - [11-17-22 @ 20:04]      Color Judy / Appearance Clear / SG 1.044 / pH 6.5      Gluc Negative / Ketone Small  / Bili Small / Urobili >12 mg/dL       Blood Small / Protein 100 mg/dL / Leuk Est Negative / Nitrite Negative      RBC 12 / WBC 11 / Hyaline 9 / Gran  / Sq Epi  / Non Sq Epi 11 / Bacteria Few    Urine Creatinine 126      [11-17-22 @ 20:04]  Urine Protein 126      [11-17-22 @ 20:04]  Urine Sodium 119      [11-17-22 @ 20:04]    Iron 97, TIBC <127, %sat --      [11-10-22 @ 06:55]  Ferritin 1813      [11-22-22 @ 07:48]  Vitamin D (25OH) 38.3      [11-15-22 @ 06:47]  Lipid: chol 84, TG 81, HDL 22, LDL --      [10-01-22 @ 07:10]    HBsAb Nonreact      [11-11-22 @ 04:15]  HBsAg Nonreact      [11-11-22 @ 04:15]  HBcAb Nonreact      [11-11-22 @ 04:15]  HCV 0.10, Nonreact      [11-15-22 @ 06:47]    ANCA: cANCA Negative, pANCA Negative, atypical ANCA Indeterminate Method interference due to CATHERINE Fluorescence      [11-10-22 @ 06:55]  MPO-ANCA <5.0, interpretation: Negative      [11-10-22 @ 06:55]  PR3-ANCA <5.0, interpretation: Negative      [11-10-22 @ 06:55]    RADIOLOGY & ADDITIONAL STUDIES:

## 2022-11-23 NOTE — PROGRESS NOTE ADULT - ASSESSMENT

## 2022-11-23 NOTE — PROGRESS NOTE ADULT - ASSESSMENT
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), s/p LVATS, LUIS wedge resection (2021) who presented as for RVATS, RUL Nodule Biopsy    COVID  SARS-CoV-2 PCR positive  O2 sat to 92%  c/w remdesivir x 5 day course  monitor liver enzymes while on remdesivir  trend inflammatory markers  hold off steroids for COVID as pt <7 days  monitor SpO2, supplemental O2 as needed, wean as tolerated  supportive care  isolation per infection control policy     vasculitis  pathology concerning for vasculitis, special stains negative for microorganisms  would hold off immunosuppressives at this time; Ideal to wait 7 days from positive test/symptoms onset for steroids in setting of COVID but no objection from ID standpoint for steroids if needed for vasculitis  per rheum awaiting clinical resolution prior to starting treatment    fever- resolved  febrile to 101.5 on 11/14, afebrile since  no evidence of SSTI  quant gold negative  US LE neg for DVT  procal <.5  CTA chest- Moderate right and small left pleural effusions with some interlobular septal thickening suggestive of pulmonary edema. Small right pneumothorax.  s/p thoracentesis 11/17, exudative effusion, cell count not consistent w/ empyema, f/u cultures- no growth  f/u blood cultures- NGTD  pathology special stains negative for microorganisms  thus far all cultures, cefepime discontinued 11/22 AM    DLBCL  s/p R VATS with RUL wedge resection and mediastinal LN dissection on 11/10/22  pathology- heterogenous population of lymphocytes; not consistent w/ recurrence  pathology c/f vasculitis vs infection  hem/onc following    penicillin allergy  reports reaction- welts as a child  tolerated ceftriaxone last month  tolerating cefepime    Gio Tate M.D.  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, not on therapy), s/p LVATS, LUIS wedge resection (2021) who presented as for RVATS, RUL Nodule Biopsy    COVID  SARS-CoV-2 PCR positive  O2 sat to 92%  c/w remdesivir x 5 day course  monitor liver enzymes while on remdesivir  trend inflammatory markers  hold off steroids for COVID as pt <7 days  monitor SpO2, supplemental O2 as needed, wean as tolerated  supportive care  isolation per infection control policy     vasculitis  pathology concerning for vasculitis, special stains negative for microorganisms  would hold off immunosuppressives at this time; Ideal to wait 7 days from positive test/symptoms onset for steroids in setting of COVID but no objection from ID standpoint for steroids if needed for vasculitis  per rheum awaiting clinical resolution prior to starting treatment    fever- resolved  febrile to 101.5 on 11/14, afebrile since  no evidence of SSTI  quant gold negative  US LE neg for DVT  procal <.5  CTA chest- Moderate right and small left pleural effusions with some interlobular septal thickening suggestive of pulmonary edema. Small right pneumothorax.  s/p thoracentesis 11/17, exudative effusion, cell count not consistent w/ empyema, f/u cultures- no growth  f/u blood cultures- NGTD  pathology special stains negative for microorganisms  thus far all cultures, cefepime discontinued 11/22 AM    DLBCL  s/p R VATS with RUL wedge resection and mediastinal LN dissection on 11/10/22  pathology- heterogenous population of lymphocytes; not consistent w/ recurrence  hem/onc following    penicillin allergy  reports reaction- welts as a child  tolerated ceftriaxone last month, tolerated cefepime this admission    Gio Tate M.D.  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), s/p LVATS, LUIS wedge resection (2021) who presented as for RVATS, RUL Nodule Biopsy    COVID  SARS-CoV-2 PCR positive  O2 sat to 92%  c/w remdesivir x 5 day course  monitor liver enzymes while on remdesivir  trend inflammatory markers  hold off steroids for COVID as pt <7 days  monitor SpO2, supplemental O2 as needed, wean as tolerated  supportive care  isolation per infection control policy     vasculitis  pathology concerning for vasculitis, special stains negative for microorganisms  would hold off immunosuppressives at this time; Ideal to wait 7 days from positive test/symptoms onset for steroids in setting of COVID but no objection from ID standpoint for steroids if needed for vasculitis  per rheum awaiting clinical resolution prior to starting treatment    fever- resolved  febrile to 101.5 on 11/14, afebrile since  no evidence of SSTI  quant gold negative  US LE neg for DVT  procal <.5  CTA chest- Moderate right and small left pleural effusions with some interlobular septal thickening suggestive of pulmonary edema. Small right pneumothorax.  s/p thoracentesis 11/17, exudative effusion, cell count not consistent w/ empyema, f/u cultures- no growth  f/u blood cultures- NGTD  pathology special stains negative for microorganisms  thus far all cultures, cefepime discontinued 11/22 AM    DLBCL  s/p R VATS with RUL wedge resection and mediastinal LN dissection on 11/10/22  pathology- heterogenous population of lymphocytes; not consistent w/ recurrence  hem/onc following    penicillin allergy  reports reaction- welts as a child  tolerated ceftriaxone last month, tolerated cefepime this admission    Dr. Naila Rogers will be covering 11/24     Gio Tate M.D.  [facility], Division of Infectious Diseases  [phone]  After 5pm on weekdays and all day on weekends - please call [phone]

## 2022-11-23 NOTE — PROGRESS NOTE ADULT - ASSESSMENT
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 Patient states that recently she has been feeling very fatigued.    Fatigue/dyspnea, with hx of Lymphoma  - Recent TTE on 11/3/22 reviewed: normal LV. outpt card Dr. Ady Cooper  - PT also saw pulm Mack Tucker in the office, with some concern for her overall status. She was hypotensive to systolic 90s in the office.  (now BP improves s/p IVF here).   - s/p thoracoscopic biopsy of mediastinal lymph node and Lung wedge resection 11/10/22  - path results favoring vasculitis, but final stains to r/o infection are still pending; no evidence for lymphoma  - 11/22/22 a pt felt more SOB early morning hours, placed on 2LNCO2, CXR  some moderate interstitial edema. ordered IV Lasix 20 mg x 1  - comfortable on nasal canula   - appreciate rheum, heme-onc f/u    Fever either 2' ANCA-mediated vasculitis vs infection  - UA, CXR unimpressive. RVP neg.   - no leukocytosis, remain stable clinically  - procal low.  - LE venous dopplers (-) for DVT  - s/p rt thoracentesis 11/17/22  - cefepime started 11/18/22 following worsened leukocytosis 11/18/22   - blood cxs 11/18/22 --> (-)  - cefepime stopped 11/22/22  - ID appreciated    (+) COVID-19 11/21/22  - remdesivir 11/21/22-->11/23/22  - steroid initiation per rheumatology service and ID. holding for now.     Rt pleural effusion  - CTA chest 11/16/22 revealed moderate rt effusion  - s/p 650 cc U/S-guided rt thoracentesis 11/17/22  - exudative but no neutrophilic predominance and initial Gram stain w/o organisms  - cultures neg.   - ID/ pulmonology consult appreciated  - monitoring off abx     Tachycardia likely 2' fever  - cont low-dose metoprolol  - card consulted (Dr. Hooker) appreciated    Anemia, unspecified  - hgb 8.1 lower than her baseline.  - no melena, no hematochezia. no BRBPR.   - recent Anemia work-up reviewed: normal vit b12, folate.  - repeat iron studies ordered    - s/p 2 units pRBC 11/1/22 per heme's note.   - s/p 1 unit pRBC 11/9/22 with appropriate post transfusion hgb.    - no melena, no hematochezia. no BRBPR.   - 11/21/22 --> s/p another unit of pRBC  - Lasix 20 mg PRN     Anxiety and depression  - stable, continue citalopram.  - Pt denied SI/HI ideations, denied visual and auditory hallucinations.     GERD (gastroesophageal reflux disease)  - continue PPI    Hyperlipidemia.   - continue home ezetimibe. okay to hold if nonformulary   - Lipid panel    DVT ppx   - SC heparin    Disposition  - PT: rehab. Pt now agreeable to rehab

## 2022-11-23 NOTE — PROGRESS NOTE ADULT - SUBJECTIVE AND OBJECTIVE BOX
SUBJECTIVE/ OVERNIGHT EVENTS:  slightly congested  (received prbc on 11/21/22)   comfortable on nasal canula  CXR with some moderate interstitial edema.  IV Lasix 20 mg x 1  no cp, no n/v/d. no abdominal pain.  no headache, no dizziness.       --------------------------------------------------------------------------------------------  LABS:                        10.1   11.71 )-----------( 253      ( 23 Nov 2022 06:07 )             31.2     11-23    137  |  106  |  16  ----------------------------<  118<H>  3.9   |  19<L>  |  0.46<L>    Ca    7.8<L>      23 Nov 2022 06:07  Phos  1.9     11-23  Mg     2.30     11-23    TPro  6.1  /  Alb  2.6<L>  /  TBili  0.6  /  DBili  x   /  AST  18  /  ALT  6   /  AlkPhos  73  11-23      CAPILLARY BLOOD GLUCOSE                RADIOLOGY & ADDITIONAL TESTS:    Imaging Personally Reviewed:  [x] YES  [ ] NO    Consultant(s) Notes Reviewed:  [x] YES  [ ] NO    MEDICATIONS  (STANDING):  acetaminophen   IVPB .. 750 milliGRAM(s) IV Intermittent once  citalopram 10 milliGRAM(s) Oral daily  fluticasone propionate 50 MICROgram(s)/spray Nasal Spray 1 Spray(s) Both Nostrils two times a day  folic acid 1 milliGRAM(s) Oral daily  furosemide   Injectable 20 milliGRAM(s) IV Push once  heparin   Injectable 5000 Unit(s) SubCutaneous every 12 hours  levalbuterol Inhalation 0.63 milliGRAM(s) Inhalation every 6 hours  lidocaine   4% Patch 1 Patch Transdermal daily  melatonin 3 milliGRAM(s) Oral at bedtime  metoprolol tartrate 25 milliGRAM(s) Oral two times a day  pantoprazole    Tablet 40 milliGRAM(s) Oral before breakfast  polyethylene glycol 3350 17 Gram(s) Oral daily  remdesivir  IVPB 100 milliGRAM(s) IV Intermittent <User Schedule>  senna 1 Tablet(s) Oral daily  sodium bicarbonate 650 milliGRAM(s) Oral three times a day  sodium phosphate 15 milliMole(s)/250 mL IVPB 15 milliMole(s) IV Intermittent once    MEDICATIONS  (PRN):  acetaminophen     Tablet .. 650 milliGRAM(s) Oral every 6 hours PRN Mild Pain (1 - 3)  benzonatate 200 milliGRAM(s) Oral three times a day PRN Cough  guaiFENesin Oral Liquid (Sugar-Free) 200 milliGRAM(s) Oral every 6 hours PRN Cough  HYDROmorphone   Tablet 1 milliGRAM(s) Oral every 3 hours PRN Moderate-Severe Pain (4 - 10)  HYDROmorphone  Injectable 0.2 milliGRAM(s) IV Push every 3 hours PRN Severe Breakthrough Pain (7 - 10)  naloxone Injectable 0.1 milliGRAM(s) IV Push every 3 minutes PRN For ANY of the following changes in patient status:  A. RR LESS THAN 10 breaths per minute, B. Oxygen saturation LESS THAN 90%, C. Sedation score of 6  ondansetron Injectable 4 milliGRAM(s) IV Push every 6 hours PRN Nausea      Care Discussed with Consultants/Other Providers [x] YES  [ ] NO    Vital Signs Last 24 Hrs  T(C): 36.3 (23 Nov 2022 12:50), Max: 36.3 (22 Nov 2022 21:10)  T(F): 97.3 (23 Nov 2022 12:50), Max: 97.3 (22 Nov 2022 21:10)  HR: 98 (23 Nov 2022 12:50) (86 - 115)  BP: 112/54 (23 Nov 2022 12:50) (112/54 - 152/64)  BP(mean): --  RR: 17 (23 Nov 2022 12:50) (17 - 20)  SpO2: 100% (23 Nov 2022 12:50) (95% - 100%)    Parameters below as of 23 Nov 2022 12:50  Patient On (Oxygen Delivery Method): nasal cannula  O2 Flow (L/min): 2    I&O's Summary      PHYSICAL EXAM:  GENERAL: NAD, thin-elderly, comfortable on nasal canula.   HEAD:  Atraumatic, Normocephalic  EYES: EOMI, PERRLA, conjunctiva and sclera clear  NECK: Supple, No JVD  CHEST/LUNG: mild decrease breath sounds bilaterally; No wheeze   HEART: Regular rate and rhythm; No murmurs, rubs, or gallops  ABDOMEN: Soft, Nontender, Nondistended; Bowel sounds present  Neuro: AAOx3, no focal weakness   EXTREMITIES:  2+ Peripheral Pulses, No clubbing, cyanosis, trace edema

## 2022-11-23 NOTE — PROGRESS NOTE ADULT - NSPROGADDITIONALINFOA_GEN_ALL_CORE
monitor respiratory status. IV Lasix x 1 today.   will try to wean O2 NC as tolerates.   close monitoring.     - Dr. MARY Arias ([facility])  - [phone] monitor respiratory status. IV Lasix x 1 today.   will try to wean O2 NC as tolerates.   close monitoring.     d/w the son Levon. Plan updated.     - [facility] M[facility]  - [phone]

## 2022-11-24 NOTE — CHART NOTE - NSCHARTNOTEFT_GEN_A_CORE
[facility] MEDICINE NIGHT COVERAGE    CC: Vtach  Notified by RN that patient showed NSVT for 1 minute on the tele monitor. Reviewed tele strip which is noted with 52 beats of Vtach. Seen and assessed the patient at bedside. Patient reported palpitations which she experienced 10 minutes before the writer was in the room. Patient denies chest pain, SOB, dizziness, and any other complaints.    Vital Signs:  T(C): 36.2 (24 Nov 2022 01:50), Max: 36.8 (23 Nov 2022 17:54)  T(F): 97.2 (24 Nov 2022 01:50), Max: 98.3 (23 Nov 2022 17:54)  HR: 103 (24 Nov 2022 02:50) (86 - 109)  BP: 141/60 (24 Nov 2022 02:50) (112/54 - 152/64)  RR: 17 (24 Nov 2022 01:50) (17 - 18)  SpO2: 98% (24 Nov 2022 01:50) (96% - 100%)    Parameters below as of 24 Nov 2022 01:50  Patient On (Oxygen Delivery Method): nasal cannula  O2 Flow (L/min): 2      PHYSICAL EXAM:  General: A&Ox3. Laying in bed, NAD.  Lungs: CTA b/l.  CV: +S1/S2, no murmur appreciated.  Extremities: Peripheral pulses intact b/l. No cyanosis or edema.                          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 MEDICINE NIGHT COVERAGE    CC: Vtach  Notified by RN that patient showed NSVT for 1 minute on the tele monitor. Reviewed tele strip which is noted with 52 beats of Vtach. Seen and assessed the patient at bedside. Patient reported palpitations which she experienced 10 minutes before the writer was in the room. Patient denies chest pain, SOB, dizziness, and any other complaints.    Vital Signs:  T(C): 36.2 (24 Nov 2022 01:50), Max: 36.8 (23 Nov 2022 17:54)  T(F): 97.2 (24 Nov 2022 01:50), Max: 98.3 (23 Nov 2022 17:54)  HR: 103 (24 Nov 2022 02:50) (86 - 109)  BP: 141/60 (24 Nov 2022 02:50) (112/54 - 152/64)  RR: 17 (24 Nov 2022 01:50) (17 - 18)  SpO2: 98% (24 Nov 2022 01:50) (96% - 100%)    Parameters below as of 24 Nov 2022 01:50  Patient On (Oxygen Delivery Method): nasal cannula  O2 Flow (L/min): 2      PHYSICAL EXAM:  General: A&Ox3. Laying in bed, NAD.  Lungs: b/l Crackles  CV: +S1/S2, no murmur appreciated.  Extremities: Peripheral pulses intact b/l. No cyanosis or edema.                          9.5    8.88  )-----------( 240      ( 24 Nov 2022 02:28 )             29.8     11-24    138  |  104  |  15  ----------------------------<  114<H>  3.0<L>   |  22  |  0.45<L>    Ca    7.4<L>      24 Nov 2022 02:28  Phos  2.8     11-24  Mg     1.80     11-24    TPro  5.7<L>  /  Alb  2.4<L>  /  TBili  0.5  /  DBili  x   /  AST  14  /  ALT  5   /  AlkPhos  61  11-24    Assessment:  80 y/o F PMhx diffuse large B cell lymphoma s/p R-CHOP, depression, GERD, PE/DVT (4/2021 no longer on Eliquis), ANCA-vasculitis (20 y/a, not on therapy), s/p LVATS, LUIS wedge resection (2021) who presented as for RVATS, RUL Nodule Biopsy, covid positive now with NSVT -52 beats likely due to electrolyte abnormality     Problem: Nonsustained episode of VTACH    Plan:  - 12 LEAD EKG   - STAT labs, lytes-->Serum K- 2.9, Repletion ordered,   Will continue to monitor and observe closely  -Administer lopressor Am dose early  - Cardiology to follow up [facility] MEDICINE NIGHT COVERAGE    CC: Vtach  Notified by RN that patient showed NSVT for 1 minute on the tele monitor. Reviewed tele strip which is noted with 52 beats of Vtach. Seen and assessed the patient at bedside. Patient reported palpitations which she experienced 10 minutes before the writer was in the room. Patient denies chest pain, SOB, dizziness, and any other complaints.    Vital Signs:  T(C): 36.2 (24 Nov 2022 01:50), Max: 36.8 (23 Nov 2022 17:54)  T(F): 97.2 (24 Nov 2022 01:50), Max: 98.3 (23 Nov 2022 17:54)  HR: 103 (24 Nov 2022 02:50) (86 - 109)  BP: 141/60 (24 Nov 2022 02:50) (112/54 - 152/64)  RR: 17 (24 Nov 2022 01:50) (17 - 18)  SpO2: 98% (24 Nov 2022 01:50) (96% - 100%)    Parameters below as of 24 Nov 2022 01:50  Patient On (Oxygen Delivery Method): nasal cannula  O2 Flow (L/min): 2      PHYSICAL EXAM:  General: A&Ox2-3. Laying in bed, NAD.  Lungs: b/l Crackles  CV: +S1/S2, no murmur appreciated.  Extremities: Peripheral pulses intact b/l. No cyanosis or edema.                          9.5    8.88  )-----------( 240      ( 24 Nov 2022 02:28 )             29.8     11-24    138  |  104  |  15  ----------------------------<  114<H>  3.0<L>   |  22  |  0.45<L>    Ca    7.4<L>      24 Nov 2022 02:28  Phos  2.8     11-24  Mg     1.80     11-24    TPro  5.7<L>  /  Alb  2.4<L>  /  TBili  0.5  /  DBili  x   /  AST  14  /  ALT  5   /  AlkPhos  61  11-24    Assessment:  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), s/p LVATS, LUIS wedge resection (2021) who presented as for RVATS, RUL Nodule Biopsy, covid positive now with NSVT -52 beats likely due to electrolyte abnormality     Problem: Nonsustained episode of VTACH    Plan:  - 12 LEAD EKG   - STAT labs, lytes-->Serum K- 2.9, Repletion ordered,   Will continue to monitor and observe closely  -Administer lopressor Am dose early  - Cardiology to follow up [facility] MEDICINE NIGHT COVERAGE    CC: Vtach  Notified by RN that patient showed NSVT for 1 minute on the tele monitor. Reviewed tele strip which is noted with 52 beats of Vtach. Seen and assessed the patient at bedside. Patient reported palpitations which she experienced 10 minutes before the writer was in the room. Patient denies chest pain, SOB, dizziness, and any other complaints.    Vital Signs:  T(C): 36.2 (24 Nov 2022 01:50), Max: 36.8 (23 Nov 2022 17:54)  T(F): 97.2 (24 Nov 2022 01:50), Max: 98.3 (23 Nov 2022 17:54)  HR: 103 (24 Nov 2022 02:50) (86 - 109)  BP: 141/60 (24 Nov 2022 02:50) (112/54 - 152/64)  RR: 17 (24 Nov 2022 01:50) (17 - 18)  SpO2: 98% (24 Nov 2022 01:50) (96% - 100%)    Parameters below as of 24 Nov 2022 01:50  Patient On (Oxygen Delivery Method): nasal cannula  O2 Flow (L/min): 2      PHYSICAL EXAM:  General: A&Ox2-3. Laying in bed, NAD.  Lungs: b/l Crackles  CV: +S1/S2, no murmur appreciated.  Extremities: Peripheral pulses intact b/l. No cyanosis or edema.                          9.5    8.88  )-----------( 240      ( 24 Nov 2022 02:28 )             29.8     11-24    138  |  104  |  15  ----------------------------<  114<H>  3.0<L>   |  22  |  0.45<L>    Ca    7.4<L>      24 Nov 2022 02:28  Phos  2.8     11-24  Mg     1.80     11-24    TPro  5.7<L>  /  Alb  2.4<L>  /  TBili  0.5  /  DBili  x   /  AST  14  /  ALT  5   /  AlkPhos  61  11-24    Assessment:  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), s/p LVATS, LUIS wedge resection (2021) who presented as for RVATS, RUL Nodule Biopsy, covid positive now with NSVT -52 beats likely due to electrolyte abnormality     Problem: Nonsustained episode of VTACH    Plan:  - 12 LEAD EKG   - STAT labs, lytes-->Serum K- 2.9, Repletion ordered,   Will continue to monitor and observe closely  -Will repeat BMP after the repletion at 0800 am   -Administer lopressor Am dose early  - Cardiology to follow up

## 2022-11-24 NOTE — PROGRESS NOTE ADULT - ASSESSMENT
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 Patient is admitted to be optimized for her surgical procedure.    Plan: OR tomorrow for RVATS, RUL Nodule Biopsy w/ IR Marking.    1: hx of lymphoma: DBLCL  2: Pleural effusion :  3: HLD:   '4: ANCA ASSOCIATED VASCULITIS      11/17:    1: hx of lymphoma: DBLCL: S/P SURGERY RUL wedge resection and LN biopsy : await results:   2: Pleural effusion : not much of chest xray  : but ct scan chest revelas more pleurl effusion on rt sdie then left:  send for chem and cultures   3: HLD: : per PMD  '4: ANCA ASSOCIATED VASCULITIS : sHE HAD FEVER:  RHEUMATOLOGY FOLLOWING ON ANTIBIOTICS:    DW THORACIC     11/18:    1: hx of lymphoma: DBLCL: S/P SURGERY RUL wedge resection and LN biopsy : await results:   2: Pleural effusion : not much of chest xray  : but ct scan chest reveals more pleural effusion on rt side then left: s/p thoracentesis : sent for cultures:  has exudative effusion ? malignancy ? lymphoma  3: HLD: : per PMD  '4: ANCA ASSOCIATED VASCULITIS : ssHE HAD FEVER:  RHEUMATOLOGY FOLLOWING ON ANTIBIOTICS: AFEBRILE IN LAST 24 HOURS AND IS ON CEFEPIME:     DW THORACIC AND ACP     11/20:      1: hx of lymphoma: DBLCL: S/P SURGERY RUL wedge resection and LN biopsy : await results:   2: Pleural effusion : not much of chest xray  : but ct scan chest reveals more pleural effusion on rt side then left: s/p thoracentesis : sent for cultures:  has exudative effusion ? malignancy ? lymphoma : await flow cyto   3: HLD: : per PMD  '4: ANCA ASSOCIATED VASCULITIS : ssHE HAD FEVER:  RHEUMATOLOGY FOLLOWING ON ANTIBIOTICS: AFEBRILE IN LAST 72 HOURS AND IS ON CEFEPIME:     DW  ACP     11/21:      1: hx of lymphoma: DBLCL: S/P SURGERY RUL wedge resection and LN biopsy : await results:   2: Pleural effusion : not much of chest xray  : but ct scan chest reveals more pleural effusion on rt side then left: s/p thoracentesis : sent for cultures:  has exudative effusion ? malignancy ? lymphoma : await flow cyto   3: HLD: : per PMD  '4: ANCA ASSOCIATED VASCULITIS : SHE HAD FEVER:  RHEUMATOLOGY FOLLOWING ON ANTIBIOTICS: AFEBRILE IN LAST 72 HOURS AND IS still off CEFEPIME: now  5: now with covid : on remdesivir:  : she is on  2 L of oxygen : but on record she did not desaturate too much      DW  ACP     11/22    1: hx of lymphoma: DBLCL: S/P SURGERY RUL wedge resection and LN biopsy : await results:   2: Pleural effusion : not much of chest xray  : but ct scan chest reveals more pleural effusion on rt side then left: s/p thoracentesis : sent for cultures:  has exudative effusion ? malignancy ? lymphoma : await flow cyto   3: HLD: : per PMD  '4: ANCA ASSOCIATED VASCULITIS : SHE HAD FEVER:  RHEUMATOLOGY FOLLOWING ON ANTIBIOTICS: AFEBRILE IN LAST 72 HOURS AND IS  off CEFEPIME: now  5: now with covid : on remdesivir:  : she is on  2 L of oxygen : but on record she did not desaturate too much  : she is on it for comfort:  demi acp : to remove oxygen and see her real o2 sao2: HER D DIMER NEEDS TO BE CHECKED    dw acp    11/23:      1: hx of lymphoma: DBLCL: S/P SURGERY RUL wedge resection and LN biopsy : await results:   2: Pleural effusion : not much of chest xray  : but ct scan chest reveals more pleural effusion on rt side then left: s/p thoracentesis : sent for cultures:  has exudative effusion ? malignancy ? lymphoma : await flow cyto   3: HLD: : per PMD  '4: ANCA ASSOCIATED VASCULITIS : SHE HAD FEVER:  RHEUMATOLOGY FOLLOWING ON ANTIBIOTICS: AFEBRILE IN LAST 72 HOURS AND IS  off CEFEPIME: now  5: now with covid : on remdesivir:  : she is on  2 L of oxygen : but on record she did not desaturate too much  : she is on it for comfort:  demi acp : to remove oxygen and see her real o2 sao2: HER D DIMER is high   ut she already had negative pe:     demi acp    11/24:    1: hx of lymphoma: DBLCL: S/P SURGERY RUL wedge resection and LN biopsy : await results:   2: Pleural effusion : not much of chest xray  : but ct scan chest reveals more pleural effusion on rt side then left: s/p thoracentesis : sent for cultures:  has exudative effusion ? malignancy ? lymphoma :   3: HLD: : per PMD  '4: ANCA ASSOCIATED VASCULITIS : SHE HAD FEVER:  RHEUMATOLOGY FOLLOWING ON ANTIBIOTICS: AFEBRILE IN LAST 72 HOURS AND IS  off CEFEPIME: now  5: now with covid : on remdesivir still   : she is on  2 L of oxygen : but on record she did not desaturate too much  : she is on it for comfort:  demi acp : to remove oxygen and see her real o2 sao2: HER D DIMER is high   but she already had negative pe: not on steroids per ID     demi acp

## 2022-11-24 NOTE — PROGRESS NOTE ADULT - NSPROGADDITIONALINFOA_GEN_ALL_CORE
d/w the son Levon. Plan updated.   the son Levon wants Rheum follow up in regards to starting steroids.    - [facility])  - [phone]

## 2022-11-24 NOTE — CHART NOTE - NSCHARTNOTEFT_GEN_A_CORE
Overnight event discussed with cards, recommended to increase metoprolol to 37.5mg BID and replete lyte

## 2022-11-24 NOTE — PROGRESS NOTE ADULT - ASSESSMENT

## 2022-11-24 NOTE — PROGRESS NOTE ADULT - SUBJECTIVE AND OBJECTIVE BOX
SUBJECTIVE/ OVERNIGHT EVENTS:  Breathing much better after Lasix  comfortable on 1L  WCT noted on tele  card increased betablocker  electrolytes supplemented  no cp, no sob, no n/v/d. no abdominal pain.  no headache, no dizziness.   the son Levon wants Rheum follow up in regards to starting steroids.    --------------------------------------------------------------------------------------------  LABS:                        9.5    8.88  )-----------( 240      ( 24 Nov 2022 02:28 )             29.8     11-24    139  |  109<H>  |  14  ----------------------------<  112<H>  5.1   |  22  |  0.47<L>    Ca    7.8<L>      24 Nov 2022 08:20  Phos  1.9     11-24  Mg     2.20     11-24    TPro  5.7<L>  /  Alb  2.4<L>  /  TBili  0.5  /  DBili  x   /  AST  14  /  ALT  5   /  AlkPhos  61  11-24      CAPILLARY BLOOD GLUCOSE                RADIOLOGY & ADDITIONAL TESTS:    Imaging Personally Reviewed:  [x] YES  [ ] NO    Consultant(s) Notes Reviewed:  [x] YES  [ ] NO    MEDICATIONS  (STANDING):  acetaminophen   IVPB .. 750 milliGRAM(s) IV Intermittent once  citalopram 10 milliGRAM(s) Oral daily  fluticasone propionate 50 MICROgram(s)/spray Nasal Spray 1 Spray(s) Both Nostrils two times a day  folic acid 1 milliGRAM(s) Oral daily  heparin   Injectable 5000 Unit(s) SubCutaneous every 12 hours  levalbuterol Inhalation 0.63 milliGRAM(s) Inhalation every 6 hours  lidocaine   4% Patch 1 Patch Transdermal daily  melatonin 3 milliGRAM(s) Oral at bedtime  metoprolol tartrate 37.5 milliGRAM(s) Oral two times a day  pantoprazole    Tablet 40 milliGRAM(s) Oral before breakfast  polyethylene glycol 3350 17 Gram(s) Oral daily  remdesivir  IVPB 100 milliGRAM(s) IV Intermittent <User Schedule>  senna 1 Tablet(s) Oral daily  sodium bicarbonate 650 milliGRAM(s) Oral three times a day    MEDICATIONS  (PRN):  acetaminophen     Tablet .. 650 milliGRAM(s) Oral every 6 hours PRN Mild Pain (1 - 3)  benzonatate 200 milliGRAM(s) Oral three times a day PRN Cough  guaiFENesin Oral Liquid (Sugar-Free) 200 milliGRAM(s) Oral every 6 hours PRN Cough  HYDROmorphone   Tablet 1 milliGRAM(s) Oral every 3 hours PRN Moderate-Severe Pain (4 - 10)  HYDROmorphone  Injectable 0.2 milliGRAM(s) IV Push every 3 hours PRN Severe Breakthrough Pain (7 - 10)  naloxone Injectable 0.1 milliGRAM(s) IV Push every 3 minutes PRN For ANY of the following changes in patient status:  A. RR LESS THAN 10 breaths per minute, B. Oxygen saturation LESS THAN 90%, C. Sedation score of 6  ondansetron Injectable 4 milliGRAM(s) IV Push every 6 hours PRN Nausea      Care Discussed with Consultants/Other Providers [x] YES  [ ] NO    Vital Signs Last 24 Hrs  T(C): 36.7 (24 Nov 2022 22:38), Max: 36.7 (24 Nov 2022 22:38)  T(F): 98.1 (24 Nov 2022 22:38), Max: 98.1 (24 Nov 2022 22:38)  HR: 100 (24 Nov 2022 22:38) (90 - 105)  BP: 119/53 (24 Nov 2022 22:38) (115/51 - 142/63)  BP(mean): --  RR: 18 (24 Nov 2022 22:38) (17 - 18)  SpO2: 100% (24 Nov 2022 22:38) (95% - 100%)    Parameters below as of 24 Nov 2022 22:38  Patient On (Oxygen Delivery Method): room air      I&O's Summary        PHYSICAL EXAM:  GENERAL: NAD, thin-elderly, comfortable on nasal canula.   HEAD:  Atraumatic, Normocephalic  EYES: EOMI, PERRLA, conjunctiva and sclera clear  NECK: Supple, No JVD  CHEST/LUNG: mild decrease breath sounds bilaterally; No wheeze   HEART: Regular rate and rhythm; No murmurs, rubs, or gallops  ABDOMEN: Soft, Nontender, Nondistended; Bowel sounds present  Neuro: AAOx3, no focal weakness   EXTREMITIES:  2+ Peripheral Pulses, No clubbing, cyanosis, trace edema

## 2022-11-24 NOTE — PROGRESS NOTE ADULT - ASSESSMENT
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 Patient states that recently she has been feeling very fatigued.    Fatigue/dyspnea, with hx of Lymphoma  - Recent TTE on 11/3/22 reviewed: normal LV. outpt card Dr. Ady Cooper  - PT also saw pulm Mack Tucker in the office, with some concern for her overall status. She was hypotensive to systolic 90s in the office.  (now BP improves s/p IVF here).   - s/p thoracoscopic biopsy of mediastinal lymph node and Lung wedge resection 11/10/22  - path results favoring vasculitis, but final stains to r/o infection are still pending; no evidence for lymphoma  - 11/22/22 a pt felt more SOB early morning hours, placed on 2LNCO2, CXR  some moderate interstitial edema. ordered IV Lasix 20 mg x 1  - comfortable on nasal canula, better post diuretic. wean O2 as tolerates   - appreciate rheum, heme-onc f/u    Fever either 2' ANCA-mediated vasculitis vs infection  - UA, CXR unimpressive. RVP neg.   - no leukocytosis, remain stable clinically  - procal low.  - LE venous dopplers (-) for DVT  - s/p rt thoracentesis 11/17/22  - cefepime started 11/18/22 following worsened leukocytosis 11/18/22   - blood cxs 11/18/22 --> (-)  - cefepime stopped 11/22/22  - ID appreciated  - rheum f/u in regards to steroids     (+) COVID-19 11/21/22  - remdesivir 11/21/22-->11/23/22  - steroid initiation per rheumatology service and ID. holding for now.     Rt pleural effusion  - CTA chest 11/16/22 revealed moderate rt effusion  - s/p 650 cc U/S-guided rt thoracentesis 11/17/22  - exudative but no neutrophilic predominance and initial Gram stain w/o organisms  - cultures neg.   - ID/ pulmonology consult appreciated  - monitoring off abx     Tachycardia likely 2' fever  - cont low-dose metoprolol  - card consulted (Dr. Hooker) appreciated    Anemia, unspecified  - hgb 8.1 lower than her baseline.  - no melena, no hematochezia. no BRBPR.   - recent Anemia work-up reviewed: normal vit b12, folate.  - repeat iron studies ordered    - s/p 2 units pRBC 11/1/22 per heme's note.   - s/p 1 unit pRBC 11/9/22 with appropriate post transfusion hgb.    - no melena, no hematochezia. no BRBPR.   - 11/21/22 --> s/p another unit of pRBC  - Lasix 20 mg PRN     Anxiety and depression  - stable, continue citalopram.  - Pt denied SI/HI ideations, denied visual and auditory hallucinations.     GERD (gastroesophageal reflux disease)  - continue PPI    Hyperlipidemia.   - continue home ezetimibe. okay to hold if nonformulary   - Lipid panel    DVT ppx   - SC heparin    Disposition  - PT: rehab. Pt now agreeable to rehab

## 2022-11-24 NOTE — PROGRESS NOTE ADULT - ASSESSMENT
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), s/p LVATS, LUIS wedge resection (2021) direct admit for RVATS, RUL Nodule Biopsy w/ IR marking    #Tachycardia, WCT  -WCT in setting of hypokalemia   -continue increased metoprolol dosing   -replete K+  -cont tele   -recent echo w normal LVEF and mild-moderate MS    #B cell Lymphoma s/p RVATS, RUL nodule biopsy  -onc followup  -s/p thoracentesis 11/17    #H/o DVT/PE  -LE dopplers neg  -CTPA noted, no pe    #HFpEF  -cxr with inc effusions, inter edema  -s/p iv lasix 11/23  -LASIX IV as needed    #covid   -tx per med/pulmo    #anemia   -prbc's per med    #R/o vasculitis   -w/u per primary team     35 minutes spent on total encounter; more than 50% of the visit was spent counseling and/or coordinating care by the attending physician.

## 2022-11-24 NOTE — PROGRESS NOTE ADULT - SUBJECTIVE AND OBJECTIVE BOX
CARDIOLOGY FOLLOW UP NOTE - DR. CRUZ    Patient Name: BETTIE PRAETR  Date of Service: 11-24-22     events noted  WCT on tele   BB increased    Subjective:    cv: denies chest pain, dyspnea, palpitations, dizziness  pulmonary: denies cough  GI: denies abdominal pain, nausea, vomiting  vascular/legs: no edema   skin: no rash  ROS: otherwise negative   overnight events:      PHYSICAL EXAM:  T(C): 36.6 (11-24-22 @ 12:14), Max: 36.8 (11-23-22 @ 17:54)  HR: 100 (11-24-22 @ 12:14) (92 - 109)  BP: 130/55 (11-24-22 @ 12:14) (122/63 - 145/66)  RR: 17 (11-24-22 @ 12:14) (17 - 18)  SpO2: 98% (11-24-22 @ 12:14) (97% - 100%)  Wt(kg): --  I&O's Summary    Daily     Daily     Appearance: Normal	  Cardiovascular: Normal S1 S2,RRR, No JVD, No murmurs  Respiratory: Lungs clear to auscultation	  Gastrointestinal:  Soft, Non-tender, + BS	  Extremities: Normal range of motion, No clubbing, cyanosis or edema      Home Medications:  citalopram 10 mg oral tablet: 1 tab(s) orally once a day (04 Nov 2022 15:16)  ezetimibe 10 mg oral tablet: 1 tab(s) orally once a day (04 Nov 2022 15:16)  folic acid 1 mg oral tablet: 1 tab(s) orally once a day (04 Nov 2022 15:16)  omeprazole 20 mg oral delayed release capsule: 1 cap(s) orally prn (04 Nov 2022 15:16)  Sodium Chloride 1 g oral tablet: daily (04 Nov 2022 15:16)      MEDICATIONS  (STANDING):  acetaminophen   IVPB .. 750 milliGRAM(s) IV Intermittent once  citalopram 10 milliGRAM(s) Oral daily  fluticasone propionate 50 MICROgram(s)/spray Nasal Spray 1 Spray(s) Both Nostrils two times a day  folic acid 1 milliGRAM(s) Oral daily  heparin   Injectable 5000 Unit(s) SubCutaneous every 12 hours  levalbuterol Inhalation 0.63 milliGRAM(s) Inhalation every 6 hours  lidocaine   4% Patch 1 Patch Transdermal daily  melatonin 3 milliGRAM(s) Oral at bedtime  metoprolol tartrate 37.5 milliGRAM(s) Oral two times a day  pantoprazole    Tablet 40 milliGRAM(s) Oral before breakfast  polyethylene glycol 3350 17 Gram(s) Oral daily  remdesivir  IVPB 100 milliGRAM(s) IV Intermittent <User Schedule>  senna 1 Tablet(s) Oral daily  sodium bicarbonate 650 milliGRAM(s) Oral three times a day      TELEMETRY: 	    ECG:  	  RADIOLOGY:   DIAGNOSTIC TESTING:  [ ] Echocardiogram:  [ ] Catheterization:  [ ] Stress Test:    OTHER: 	    LABS:	 	    CARDIAC MARKERS:                                      9.5    8.88  )-----------( 240      ( 24 Nov 2022 02:28 )             29.8     11-24    139  |  109<H>  |  14  ----------------------------<  112<H>  5.1   |  22  |  0.47<L>    Ca    7.8<L>      24 Nov 2022 08:20  Phos  1.9     11-24  Mg     2.20     11-24    TPro  5.7<L>  /  Alb  2.4<L>  /  TBili  0.5  /  DBili  x   /  AST  14  /  ALT  5   /  AlkPhos  61  11-24    proBNP:     Lipid Profile:   HgA1c:     Creatinine, Serum: 0.47 mg/dL (11-24-22 @ 08:20)  Creatinine, Serum: 0.45 mg/dL (11-24-22 @ 02:28)  Creatinine, Serum: 0.46 mg/dL (11-23-22 @ 06:07)  Creatinine, Serum: 0.49 mg/dL (11-22-22 @ 07:48)

## 2022-11-24 NOTE — PROGRESS NOTE ADULT - SUBJECTIVE AND OBJECTIVE BOX
Date of Service: 11-24-22 @ 12:59    Patient is a 81y old  Female who presents with a chief complaint of RVATS, RUL Nodule Biopsy w/ IR marking (23 Nov 2022 16:13)      Any change in ROS: doing  ok : 2 L of oxygen : she looks pretty weak    MEDICATIONS  (STANDING):  acetaminophen   IVPB .. 750 milliGRAM(s) IV Intermittent once  citalopram 10 milliGRAM(s) Oral daily  fluticasone propionate 50 MICROgram(s)/spray Nasal Spray 1 Spray(s) Both Nostrils two times a day  folic acid 1 milliGRAM(s) Oral daily  heparin   Injectable 5000 Unit(s) SubCutaneous every 12 hours  levalbuterol Inhalation 0.63 milliGRAM(s) Inhalation every 6 hours  lidocaine   4% Patch 1 Patch Transdermal daily  melatonin 3 milliGRAM(s) Oral at bedtime  metoprolol tartrate 37.5 milliGRAM(s) Oral two times a day  pantoprazole    Tablet 40 milliGRAM(s) Oral before breakfast  polyethylene glycol 3350 17 Gram(s) Oral daily  remdesivir  IVPB 100 milliGRAM(s) IV Intermittent <User Schedule>  senna 1 Tablet(s) Oral daily  sodium bicarbonate 650 milliGRAM(s) Oral three times a day    MEDICATIONS  (PRN):  acetaminophen     Tablet .. 650 milliGRAM(s) Oral every 6 hours PRN Mild Pain (1 - 3)  benzonatate 200 milliGRAM(s) Oral three times a day PRN Cough  guaiFENesin Oral Liquid (Sugar-Free) 200 milliGRAM(s) Oral every 6 hours PRN Cough  HYDROmorphone   Tablet 1 milliGRAM(s) Oral every 3 hours PRN Moderate-Severe Pain (4 - 10)  HYDROmorphone  Injectable 0.2 milliGRAM(s) IV Push every 3 hours PRN Severe Breakthrough Pain (7 - 10)  naloxone Injectable 0.1 milliGRAM(s) IV Push every 3 minutes PRN For ANY of the following changes in patient status:  A. RR LESS THAN 10 breaths per minute, B. Oxygen saturation LESS THAN 90%, C. Sedation score of 6  ondansetron Injectable 4 milliGRAM(s) IV Push every 6 hours PRN Nausea    Vital Signs Last 24 Hrs  T(C): 36.6 (24 Nov 2022 12:14), Max: 36.8 (23 Nov 2022 17:54)  T(F): 97.8 (24 Nov 2022 12:14), Max: 98.3 (23 Nov 2022 17:54)  HR: 100 (24 Nov 2022 12:14) (92 - 109)  BP: 130/55 (24 Nov 2022 12:14) (122/63 - 145/66)  BP(mean): --  RR: 17 (24 Nov 2022 12:14) (17 - 18)  SpO2: 98% (24 Nov 2022 12:14) (97% - 100%)    Parameters below as of 24 Nov 2022 12:14  Patient On (Oxygen Delivery Method): nasal cannula  O2 Flow (L/min): 1      I&O's Summary        Physical Exam:   GENERAL: NAD, well-groomed, well-developed  HEENT: RANJIT/   Atraumatic, Normocephalic  ENMT: No tonsillar erythema, exudates, or enlargement; Moist mucous membranes, Good dentition, No lesions  NECK: Supple, No JVD, Normal thyroid  CHEST/LUNG: bibasilar cracekls+ : no wheezing  CVS: Regular rate and rhythm; No murmurs, rubs, or gallops  GI: : Soft, Nontender, Nondistended; Bowel sounds present  NERVOUS SYSTEM:  Alert & Oriented X3  EXTREMITIES:- edema  LYMPH: No lymphadenopathy noted  SKIN: No rashes or lesions  ENDOCRINOLOGY: No Thyromegaly  PSYCH: Appropriate    Labs:  22, 17                            9.5    8.88  )-----------( 240      ( 24 Nov 2022 02:28 )             29.8                         10.1   11.71 )-----------( 253      ( 23 Nov 2022 06:07 )             31.2                         10.0   22.07 )-----------( 320      ( 22 Nov 2022 07:48 )             31.0                         9.4    16.19 )-----------( 250      ( 21 Nov 2022 17:12 )             29.5                         6.7    12.82 )-----------( 278      ( 21 Nov 2022 04:43 )             21.6     11-24    139  |  109<H>  |  14  ----------------------------<  112<H>  5.1   |  22  |  0.47<L>  11-24    138  |  104  |  15  ----------------------------<  114<H>  3.0<L>   |  22  |  0.45<L>  11-23    137  |  106  |  16  ----------------------------<  118<H>  3.9   |  19<L>  |  0.46<L>  11-22    132<L>  |  104  |  13  ----------------------------<  196<H>  3.7   |  16<L>  |  0.49<L>  11-21    135  |  103  |  18  ----------------------------<  95  3.4<L>   |  18<L>  |  0.62    Ca    7.8<L>      24 Nov 2022 08:20  Ca    7.4<L>      24 Nov 2022 02:28  Ca    7.8<L>      23 Nov 2022 06:07  Phos  1.9     11-24  Phos  2.8     11-24  Phos  1.9     11-23  Mg     2.20     11-24  Mg     1.80     11-24  Mg     2.30     11-23    TPro  5.7<L>  /  Alb  2.4<L>  /  TBili  0.5  /  DBili  x   /  AST  14  /  ALT  5   /  AlkPhos  61  11-24  TPro  6.1  /  Alb  2.6<L>  /  TBili  0.6  /  DBili  x   /  AST  18  /  ALT  6   /  AlkPhos  73  11-23  TPro  x   /  Alb  x   /  TBili  0.7  /  DBili  0.3  /  AST  x   /  ALT  x   /  AlkPhos  x   11-22    CAPILLARY BLOOD GLUCOSE          LIVER FUNCTIONS - ( 24 Nov 2022 02:28 )  Alb: 2.4 g/dL / Pro: 5.7 g/dL / ALK PHOS: 61 U/L / ALT: 5 U/L / AST: 14 U/L / GGT: x               D-Dimer Assay, Quantitative: 2286 ng/mL DDU (11-23 @ 06:07)  Procalcitonin, Serum: 0.24 ng/mL (11-22 @ 07:48)  Fluid Source --  Albumin, Fluid1.5 g/dL  Glucose, Sflfr467 mg/dL  Protein total, Fluid2.9 g/dL  Lacatate Dehydrogenase, Hfogs003 U/L  pH, Fluid7.7  Cytopathology-Non Gyn Report--        RECENT CULTURES:  11-18 @ 09:45 .Blood Blood-Peripheral                No Growth Final    11-18 @ 06:00 .Blood Blood-Peripheral         rad< from: Xray Chest 1 View-PORTABLE IMMEDIATE (Xray Chest 1 View-PORTABLE IMMEDIATE .) (11.22.22 @ 16:18) >  Implantable port overlying the left chest wall with its tip terminating   in the upper right atrium.  Left upper lung field chain sutures are noted.  The heart size cannot be accurately assessed in this projection.  Bilateral interstitial and bibasilar hazy opacities.  There is no pneumothorax.  No acute bony abnormality.    IMPRESSION:  Bilateral interstitial opacities.  Bibasilar hazy opacities, compatible with small bilateral effusions   and/or bibasilar subsegmental atelectasis. Infectious etiology cannot be   ruled out.    --- End of Report ---          KATHY FERGUSON MD; Resident Radiologist  This document has been electronically signed.  ALEXA ZABALA MD; AttendingInterventional Radiologist  This document has been electronically signed. Nov 23 2022 12:47PM    < end of copied text >         No Growth Final    11-17 @ 16:04 Pleural Fl Pleural Fluid       polymorphonuclear leukocytes seen  No organisms seen  by cytocentrifuge           No growth at 5 days          RESPIRATORY CULTURES:          Studies  Chest X-RAY  CT SCAN Chest   Venous Dopplers: LE:   CT Abdomen  Others

## 2022-11-24 NOTE — PROGRESS NOTE ADULT - SUBJECTIVE AND OBJECTIVE BOX
[facility] [place] [facility]   MD RONEL FRIAS MD KRISTINE SOLTANPOUR, DO ANGELA WONG, PA    TEL:  OFFICE: [phone]  From 5pm-7am Answering Service [phone]    -- RENAL FOLLOW UP NOTE ---Date of Service 11-24-22 @ 23:08    Patient is a 81y old  Female who presents with a chief complaint of RVATS, RUL Nodule Biopsy w/ IR marking (24 Nov 2022 15:37)      Patient seen and examined at bedside. No chest pain/sob    VITALS:  T(F): 98.1 (11-24-22 @ 22:38), Max: 98.1 (11-24-22 @ 22:38)  HR: 100 (11-24-22 @ 22:38)  BP: 119/53 (11-24-22 @ 22:38)  RR: 18 (11-24-22 @ 22:38)  SpO2: 100% (11-24-22 @ 22:38)  Wt(kg): --        PHYSICAL EXAM:  General: NAD  Neck: No JVD  Respiratory: CTAB, no wheezes, rales or rhonchi  Cardiovascular: S1, S2, RRR  Gastrointestinal: BS+, soft, NT/ND  Extremities: No peripheral edema    Hospital Medications:   MEDICATIONS  (STANDING):  acetaminophen   IVPB .. 750 milliGRAM(s) IV Intermittent once  citalopram 10 milliGRAM(s) Oral daily  fluticasone propionate 50 MICROgram(s)/spray Nasal Spray 1 Spray(s) Both Nostrils two times a day  folic acid 1 milliGRAM(s) Oral daily  heparin   Injectable 5000 Unit(s) SubCutaneous every 12 hours  levalbuterol Inhalation 0.63 milliGRAM(s) Inhalation every 6 hours  lidocaine   4% Patch 1 Patch Transdermal daily  melatonin 3 milliGRAM(s) Oral at bedtime  metoprolol tartrate 37.5 milliGRAM(s) Oral two times a day  pantoprazole    Tablet 40 milliGRAM(s) Oral before breakfast  polyethylene glycol 3350 17 Gram(s) Oral daily  remdesivir  IVPB 100 milliGRAM(s) IV Intermittent <User Schedule>  senna 1 Tablet(s) Oral daily  sodium bicarbonate 650 milliGRAM(s) Oral three times a day      LABS:  11-24    139  |  109<H>  |  14  ----------------------------<  112<H>  5.1   |  22  |  0.47<L>    Ca    7.8<L>      24 Nov 2022 08:20  Phos  1.9     11-24  Mg     2.20     11-24    TPro  5.7<L>  /  Alb  2.4<L>  /  TBili  0.5  /  DBili      /  AST  14  /  ALT  5   /  AlkPhos  61  11-24    Creatinine Trend: 0.47 <--, 0.45 <--, 0.46 <--, 0.49 <--, 0.62 <--, 0.55 <--, 0.53 <--, 0.60 <--    Phosphorus Level, Serum: 1.9 mg/dL (11-24 @ 08:20)  Albumin, Serum: 2.4 g/dL (11-24 @ 02:28)  Phosphorus Level, Serum: 2.8 mg/dL (11-24 @ 02:28)                              9.5    8.88  )-----------( 240      ( 24 Nov 2022 02:28 )             29.8     Urine Studies:  Urinalysis - [11-17-22 @ 20:04]      Color Judy / Appearance Clear / SG 1.044 / pH 6.5      Gluc Negative / Ketone Small  / Bili Small / Urobili >12 mg/dL       Blood Small / Protein 100 mg/dL / Leuk Est Negative / Nitrite Negative      RBC 12 / WBC 11 / Hyaline 9 / Gran  / Sq Epi  / Non Sq Epi 11 / Bacteria Few      Iron 97, TIBC <127, %sat --      [11-10-22 @ 06:55]  Ferritin 1813      [11-22-22 @ 07:48]  Vitamin D (25OH) 38.3      [11-15-22 @ 06:47]  Lipid: chol 84, TG 81, HDL 22, LDL --      [10-01-22 @ 07:10]    HBsAb Nonreact      [11-11-22 @ 04:15]  HBsAg Nonreact      [11-11-22 @ 04:15]  HBcAb Nonreact      [11-11-22 @ 04:15]  HCV 0.10, Nonreact      [11-15-22 @ 06:47]    ANCA: cANCA Negative, pANCA Negative, atypical ANCA Indeterminate Method interference due to CATHERINE Fluorescence      [11-10-22 @ 06:55]  MPO-ANCA <5.0, interpretation: Negative      [11-10-22 @ 06:55]  PR3-ANCA <5.0, interpretation: Negative      [11-10-22 @ 06:55]    RADIOLOGY & ADDITIONAL STUDIES:

## 2022-11-25 NOTE — PROGRESS NOTE ADULT - SUBJECTIVE AND OBJECTIVE BOX
Date of Service: 11-25-22 @ 10:50    Patient is a 81y old  Female who presents with a chief complaint of RVATS, RUL Nodule Biopsy w/ IR marking (25 Nov 2022 09:45)      Any change in ROS:  she is on 2 L of oxygen  : but at times she deos not use it;     MEDICATIONS  (STANDING):  acetaminophen   IVPB .. 750 milliGRAM(s) IV Intermittent once  citalopram 10 milliGRAM(s) Oral daily  fluticasone propionate 50 MICROgram(s)/spray Nasal Spray 1 Spray(s) Both Nostrils two times a day  folic acid 1 milliGRAM(s) Oral daily  guaiFENesin  milliGRAM(s) Oral every 12 hours  heparin   Injectable 5000 Unit(s) SubCutaneous every 12 hours  levalbuterol 45 MICROgram(s) HFA Inhaler 2 Puff(s) Inhalation every 6 hours  lidocaine   4% Patch 1 Patch Transdermal daily  melatonin 3 milliGRAM(s) Oral at bedtime  metoprolol tartrate 37.5 milliGRAM(s) Oral two times a day  pantoprazole    Tablet 40 milliGRAM(s) Oral before breakfast  polyethylene glycol 3350 17 Gram(s) Oral daily  potassium phosphate / sodium phosphate Tablet (K-PHOS No. 2) 1 Tablet(s) Oral three times a day with meals  remdesivir  IVPB 100 milliGRAM(s) IV Intermittent <User Schedule>  senna 1 Tablet(s) Oral daily  sodium bicarbonate 650 milliGRAM(s) Oral three times a day    MEDICATIONS  (PRN):  acetaminophen     Tablet .. 650 milliGRAM(s) Oral every 6 hours PRN Mild Pain (1 - 3)  benzonatate 200 milliGRAM(s) Oral three times a day PRN Cough  naloxone Injectable 0.1 milliGRAM(s) IV Push every 3 minutes PRN For ANY of the following changes in patient status:  A. RR LESS THAN 10 breaths per minute, B. Oxygen saturation LESS THAN 90%, C. Sedation score of 6  ondansetron Injectable 4 milliGRAM(s) IV Push every 6 hours PRN Nausea    Vital Signs Last 24 Hrs  T(C): 36.3 (25 Nov 2022 06:15), Max: 36.7 (24 Nov 2022 22:38)  T(F): 97.3 (25 Nov 2022 06:15), Max: 98.1 (24 Nov 2022 22:38)  HR: 100 (25 Nov 2022 06:15) (90 - 100)  BP: 143/64 (25 Nov 2022 06:15) (115/51 - 143/64)  BP(mean): --  RR: 17 (25 Nov 2022 06:15) (17 - 18)  SpO2: 100% (25 Nov 2022 06:15) (95% - 100%)    Parameters below as of 25 Nov 2022 06:15  Patient On (Oxygen Delivery Method): nasal cannula  O2 Flow (L/min): 1      I&O's Summary        Physical Exam:   GENERAL: NAD, well-groomed, well-developed  HEENT: RANJIT/   Atraumatic, Normocephalic  ENMT: No tonsillar erythema, exudates, or enlargement; Moist mucous membranes, Good dentition, No lesions  NECK: Supple, No JVD, Normal thyroid  CHEST/LUNG: Clear to auscultaion  CVS: Regular rate and rhythm; No murmurs, rubs, or gallops  GI: : Soft, Nontender, Nondistended; Bowel sounds present  NERVOUS SYSTEM:  Alert & Oriented X3  EXTREMITIES: - edema  LYMPH: No lymphadenopathy noted  SKIN: No rashes or lesions  ENDOCRINOLOGY: No Thyromegaly  PSYCH: Appropriate    Labs:  22, 17                            9.5    8.88  )-----------( 253      ( 25 Nov 2022 06:00 )             30.5                         9.5    8.88  )-----------( 240      ( 24 Nov 2022 02:28 )             29.8                         10.1   11.71 )-----------( 253      ( 23 Nov 2022 06:07 )             31.2                         10.0   22.07 )-----------( 320      ( 22 Nov 2022 07:48 )             31.0                         9.4    16.19 )-----------( 250      ( 21 Nov 2022 17:12 )             29.5     11-25    138  |  107  |  13  ----------------------------<  102<H>  4.4   |  23  |  0.48<L>  11-24    139  |  109<H>  |  14  ----------------------------<  112<H>  5.1   |  22  |  0.47<L>  11-24    138  |  104  |  15  ----------------------------<  114<H>  3.0<L>   |  22  |  0.45<L>  11-23    137  |  106  |  16  ----------------------------<  118<H>  3.9   |  19<L>  |  0.46<L>  11-22    132<L>  |  104  |  13  ----------------------------<  196<H>  3.7   |  16<L>  |  0.49<L>    Ca    7.9<L>      25 Nov 2022 06:00  Ca    7.8<L>      24 Nov 2022 08:20  Ca    7.4<L>      24 Nov 2022 02:28  Phos  2.0     11-25  Phos  1.9     11-24  Phos  2.8     11-24  Mg     1.90     11-25  Mg     2.20     11-24  Mg     1.80     11-24    TPro  5.6<L>  /  Alb  2.4<L>  /  TBili  0.6  /  DBili  x   /  AST  15  /  ALT  <5  /  AlkPhos  71  11-25  TPro  5.7<L>  /  Alb  2.4<L>  /  TBili  0.5  /  DBili  x   /  AST  14  /  ALT  5   /  AlkPhos  61  11-24  TPro  6.1  /  Alb  2.6<L>  /  TBili  0.6  /  DBili  x   /  AST  18  /  ALT  6   /  AlkPhos  73  11-23  TPro  x   /  Alb  x   /  TBili  0.7  /  DBili  0.3  /  AST  x   /  ALT  x   /  AlkPhos  x   11-22    CAPILLARY BLOOD GLUCOSE          LIVER FUNCTIONS - ( 25 Nov 2022 06:00 )  Alb: 2.4 g/dL / Pro: 5.6 g/dL / ALK PHOS: 71 U/L / ALT: <5 U/L / AST: 15 U/L / GGT: x               D-Dimer Assay, Quantitative: 2792 ng/mL DDU (11-25 @ 06:00)  D-Dimer Assay, Quantitative: 2286 ng/mL DDU (11-23 @ 06:07)  Procalcitonin, Serum: 0.22 ng/mL (11-25 @ 06:00)  Procalcitonin, Serum: 0.24 ng/mL (11-22 @ 07:48)        RECENT CULTURES:        RESPIRATORY CULTURES:      rad< from: Xray Chest 1 View-PORTABLE IMMEDIATE (Xray Chest 1 View-PORTABLE IMMEDIATE .) (11.22.22 @ 16:18) >    TECHNIQUE: Single frontal, portable view of the chest was obtained.    COMPARISON: Chest x-ray 11/18/2022.    FINDINGS:  Implantable port overlying the left chest wall with its tip terminating   in the upper right atrium.  Left upper lung field chain sutures are noted.  The heart size cannot be accurately assessed in this projection.  Bilateral interstitial and bibasilar hazy opacities.  There is no pneumothorax.  No acute bony abnormality.    IMPRESSION:  Bilateral interstitial opacities.  Bibasilar hazy opacities, compatible with small bilateral effusions   and/or bibasilar subsegmental atelectasis. Infectious etiology cannot be   ruled out.    --- End of Report ---          KATHY FERGUSON MD; Resident Radiologist  This document has been electronically signed.  ALEXA ZABALA MD; AttendingInterventional Radiologist  This document has been electronically signed. Nov 23 2022 12:47PM    < end of copied text >      Studies  Chest X-RAY  CT SCAN Chest   Venous Dopplers: LE:   CT Abdomen  Others

## 2022-11-25 NOTE — PROGRESS NOTE ADULT - ASSESSMENT
80 y/o F PMhx diffuse large B cell lymphoma s/p R-CHOP, depression, GERD, PE/DVT (4/2021 no longer on Eliquis), ANCA-vasculitis (22 y/a, not on therapy), s/p LVATS, LUIS wedge resection (2021) who presented as for RVATS, RUL Nodule Biopsy    COVID  SARS-CoV-2 PCR positive  O2 sat to 92%  c/w remdesivir x 5 day course, last day today  monitor liver enzymes while on remdesivir  trend inflammatory markers  monitor SpO2, supplemental O2 as needed, wean as tolerated  if patient 94% or less ok RA ok to start decadron  supportive care  isolation per infection control policy     vasculitis  pathology concerning for vasculitis, special stains negative for microorganisms  no objection from ID standpoint for steroids for vasculitis  per rheum awaiting clinical resolution prior to starting treatment    fever- resolved  febrile to 101.5 on 11/14, afebrile since  no evidence of SSTI  quant gold negative  US LE neg for DVT  procal <.5  CTA chest- Moderate right and small left pleural effusions with some interlobular septal thickening suggestive of pulmonary edema. Small right pneumothorax.  s/p thoracentesis 11/17, exudative effusion, cultures- no growth  blood cultures- no growth final  pathology special stains negative for microorganisms  all cultures w/o growth, cefepime discontinued 11/22    DLBCL  s/p R VATS with RUL wedge resection and mediastinal LN dissection on 11/10/22  pathology- heterogenous population of lymphocytes; not consistent w/ recurrence  hem/onc following    penicillin allergy  reports reaction- welts as a child  tolerated ceftriaxone last month, tolerated cefepime this admission    Dr. Naila Rogers will be covering 11/26-11/27     Gio Tate M.D.  [facility], Division of Infectious Diseases  [phone]  After 5pm on weekdays and all day on weekends - please call [phone]

## 2022-11-25 NOTE — PROGRESS NOTE ADULT - ASSESSMENT
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), s/p LVATS, LUIS wedge resection (2021) direct admit for RVATS, RUL Nodule Biopsy w/ IR marking    #Tachycardia, WCT, SVT  -WCT in setting of hypokalemia   -increase metoprolol to 50mg PO BID  -replete K+  -cont tele   -recent echo w normal LVEF and mild-moderate MS    #B cell Lymphoma s/p RVATS, RUL nodule biopsy  -onc followup  -s/p thoracentesis 11/17    #H/o DVT/PE  -LE dopplers neg  -CTPA noted, no pe    #HFpEF  -cxr with inc effusions, inter edema  -s/p iv lasix 11/23  -LASIX IV as needed    #covid   -tx per med/pulmo    #anemia   -prbc's per med    #R/o vasculitis   -w/u per primary team     35 minutes spent on total encounter; more than 50% of the visit was spent counseling and/or coordinating care by the attending physician.

## 2022-11-25 NOTE — CHART NOTE - NSCHARTNOTEFT_GEN_A_CORE
Notified by RN pt with SVT 140s.   Telemetry reviewed appear more atrial tachycardia in 140s now back to sinus 100s     Pt assess at bedside pt denied any CP, SOB (that is worse than baseline), palpitation. Continue to have persistent cough    Vital Signs Last 24 Hrs  T(C): 36.3 (25 Nov 2022 06:15), Max: 36.7 (24 Nov 2022 22:38)  T(F): 97.3 (25 Nov 2022 06:15), Max: 98.1 (24 Nov 2022 22:38)  HR: 100 (25 Nov 2022 06:15) (90 - 104)  BP: 143/64 (25 Nov 2022 06:15) (115/51 - 143/64)  BP(mean): --  RR: 17 (25 Nov 2022 06:15) (17 - 18)  SpO2: 100% (25 Nov 2022 06:15) (95% - 100%)    Parameters below as of 25 Nov 2022 06:15  Patient On (Oxygen Delivery Method): nasal cannula  O2 Flow (L/min): 1      S1S2 no murmur no gallops   Anterior lung noted mild wheezing. Posterior lung base slight crackle   ABD soft, NT, ND, +BS  no pitting edema    - albuterol HFA  - continue on BB and monitor   - will discuss with above event with cardiology Notified by RN pt with SVT 140s.   Telemetry reviewed appear more atrial tachycardia in 140s now back to sinus 100s     Pt assess at bedside pt denied any CP, SOB (that is worse than baseline), palpitation. Continue to have persistent cough    Vital Signs Last 24 Hrs  T(C): 36.3 (25 Nov 2022 06:15), Max: 36.7 (24 Nov 2022 22:38)  T(F): 97.3 (25 Nov 2022 06:15), Max: 98.1 (24 Nov 2022 22:38)  HR: 100 (25 Nov 2022 06:15) (90 - 104)  BP: 143/64 (25 Nov 2022 06:15) (115/51 - 143/64)  BP(mean): --  RR: 17 (25 Nov 2022 06:15) (17 - 18)  SpO2: 100% (25 Nov 2022 06:15) (95% - 100%)    Parameters below as of 25 Nov 2022 06:15  Patient On (Oxygen Delivery Method): nasal cannula  O2 Flow (L/min): 1      S1S2 no murmur no gallops   Anterior lung noted mild wheezing. Posterior lung base slight crackle   ABD soft, NT, ND, +BS  no pitting edema    - Xopenex HFA, mucinex ER (of not pt cannot get nebs due to COVID status, Xopenex inhalation discontinued)  - continue on BB and monitor   - will discuss with above event with cardiology

## 2022-11-25 NOTE — PROGRESS NOTE ADULT - ASSESSMENT
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 Patient states that recently she has been feeling very fatigued.    Fatigue/dyspnea, with hx of Lymphoma  - Recent TTE on 11/3/22 reviewed: normal LV. outpt card Dr. Ady Cooper  - PT also saw pulm Mack Tucker in the office, with some concern for her overall status. She was hypotensive to systolic 90s in the office.  (now BP improves s/p IVF here).   - s/p thoracoscopic biopsy of mediastinal lymph node and Lung wedge resection 11/10/22  - path results favoring vasculitis, but final stains to r/o infection are still pending; no evidence for lymphoma  - 11/22/22 a pt felt more SOB early morning hours, placed on 2LNCO2, CXR  some moderate interstitial edema. ordered IV Lasix 20 mg x 1  - comfortable on nasal canula, better post diuretic. wean O2 as tolerates   - appreciate rheum, heme-onc f/u  - no plan for immunosuppressants while being treated for COVID19    Fever either 2' ANCA-mediated vasculitis vs infection  - UA, CXR unimpressive. RVP neg.   - no leukocytosis, remain stable clinically  - procal low.  - LE venous dopplers (-) for DVT  - s/p rt thoracentesis 11/17/22  - cefepime started 11/18/22 following worsened leukocytosis 11/18/22   - blood cxs 11/18/22 --> (-)  - cefepime stopped 11/22/22  - ID appreciated  - rheum f/u in regards to steroids     (+) COVID-19 11/21/22  - remdesivir 11/21/22-->11/23/22  - steroid initiation per rheumatology service and ID. holding for now.   - monitoring inflammatory markers  - ferritin, CRP downtrending  - d-dimer uptrending  - Hep SQ BID switch to Lovenox SQ (Cr normal)    Rt pleural effusion  - CTA chest 11/16/22 revealed moderate rt effusion  - s/p 650 cc U/S-guided rt thoracentesis 11/17/22  - exudative but no neutrophilic predominance and initial Gram stain w/o organisms  - cultures neg.   - ID/ pulmonology consult appreciated  - monitoring off abx     Tachycardia likely 2' fever  - cont low-dose metoprolol  - card consulted (Dr. Hooker) appreciated    Anemia, unspecified  - hgb 8.1 lower than her baseline.  - no melena, no hematochezia. no BRBPR.   - recent Anemia work-up reviewed: normal vit b12, folate.  - repeat iron studies ordered    - s/p 2 units pRBC 11/1/22 per heme's note.   - s/p 1 unit pRBC 11/9/22 with appropriate post transfusion hgb.    - no melena, no hematochezia. no BRBPR.   - 11/21/22 --> s/p another unit of pRBC  - Lasix 20 mg PRN     Anxiety and depression  - stable, continue citalopram.  - Pt denied SI/HI ideations, denied visual and auditory hallucinations.     GERD (gastroesophageal reflux disease)  - continue PPI    Hyperlipidemia.   - continue home ezetimibe. okay to hold if nonformulary   - Lipid panel    DVT ppx   - SC heparin --> Lovenox SQ    Disposition  - PT: rehab. Pt now agreeable to rehab

## 2022-11-25 NOTE — PROGRESS NOTE ADULT - NSPROGADDITIONALINFOA_GEN_ALL_CORE
d/w the son Levon. Plan updated.   the son Levon wants Rheum follow up in regards to starting steroids.    - Dr. MARY Arias ([facility])  - [phone] d/w the son Levon. Plan updated 11/24/22. the son Levon wants Rheum follow up in regards to starting steroids. no plan for now per rheum.    monitor D-dimer.     - Dr. MARY Arias ([facility])  - [phone]

## 2022-11-25 NOTE — PROGRESS NOTE ADULT - SUBJECTIVE AND OBJECTIVE BOX
[facility]   MD RONEL FRIAS MD KRISTINE SOLTANPOUR, DO ANGELA WONG, PA    TEL:  OFFICE: [phone]  From 5pm-7am Answering Service [phone]    -- RENAL FOLLOW UP NOTE ---Date of Service 11-25-22 @ 11:53    Patient is a 81y old  Female who presents with a chief complaint of RVATS, RUL Nodule Biopsy w/ IR marking (25 Nov 2022 11:19)      Patient seen and examined at bedside. No chest pain/sob    VITALS:  T(F): 97.3 (11-25-22 @ 06:15), Max: 98.1 (11-24-22 @ 22:38)  HR: 100 (11-25-22 @ 06:15)  BP: 143/64 (11-25-22 @ 06:15)  RR: 17 (11-25-22 @ 06:15)  SpO2: 100% (11-25-22 @ 06:15)  Wt(kg): --        PHYSICAL EXAM:  General: NAD  Neck: No JVD  Respiratory: slight rhonchi  Cardiovascular: S1, S2, RRR  Gastrointestinal: BS+, soft, NT/ND  Extremities: No peripheral edema    Hospital Medications:   MEDICATIONS  (STANDING):  acetaminophen   IVPB .. 750 milliGRAM(s) IV Intermittent once  citalopram 10 milliGRAM(s) Oral daily  fluticasone propionate 50 MICROgram(s)/spray Nasal Spray 1 Spray(s) Both Nostrils two times a day  folic acid 1 milliGRAM(s) Oral daily  guaiFENesin  milliGRAM(s) Oral every 12 hours  heparin   Injectable 5000 Unit(s) SubCutaneous every 12 hours  levalbuterol 45 MICROgram(s) HFA Inhaler 2 Puff(s) Inhalation every 6 hours  lidocaine   4% Patch 1 Patch Transdermal daily  melatonin 3 milliGRAM(s) Oral at bedtime  metoprolol tartrate 50 milliGRAM(s) Oral two times a day  pantoprazole    Tablet 40 milliGRAM(s) Oral before breakfast  polyethylene glycol 3350 17 Gram(s) Oral daily  potassium phosphate / sodium phosphate Tablet (K-PHOS No. 2) 1 Tablet(s) Oral three times a day with meals  remdesivir  IVPB 100 milliGRAM(s) IV Intermittent <User Schedule>  senna 1 Tablet(s) Oral daily  sodium bicarbonate 650 milliGRAM(s) Oral three times a day      LABS:  11-25    138  |  107  |  13  ----------------------------<  102<H>  4.4   |  23  |  0.48<L>    Ca    7.9<L>      25 Nov 2022 06:00  Phos  2.0     11-25  Mg     1.90     11-25    TPro  5.6<L>  /  Alb  2.4<L>  /  TBili  0.6  /  DBili      /  AST  15  /  ALT  <5  /  AlkPhos  71  11-25    Creatinine Trend: 0.48 <--, 0.47 <--, 0.45 <--, 0.46 <--, 0.49 <--, 0.62 <--, 0.55 <--, 0.53 <--    Ferritin, Serum: 1570 ng/mL (11-25 @ 06:00)  Albumin, Serum: 2.4 g/dL (11-25 @ 06:00)  Phosphorus Level, Serum: 2.0 mg/dL (11-25 @ 06:00)                              9.5    8.88  )-----------( 253      ( 25 Nov 2022 06:00 )             30.5     Urine Studies:  Urinalysis - [11-17-22 @ 20:04]      Color Judy / Appearance Clear / SG 1.044 / pH 6.5      Gluc Negative / Ketone Small  / Bili Small / Urobili >12 mg/dL       Blood Small / Protein 100 mg/dL / Leuk Est Negative / Nitrite Negative      RBC 12 / WBC 11 / Hyaline 9 / Gran  / Sq Epi  / Non Sq Epi 11 / Bacteria Few      Iron 97, TIBC <127, %sat --      [11-10-22 @ 06:55]  Ferritin 1570      [11-25-22 @ 06:00]  Vitamin D (25OH) 38.3      [11-15-22 @ 06:47]  Lipid: chol 84, TG 81, HDL 22, LDL --      [10-01-22 @ 07:10]    HBsAb Nonreact      [11-11-22 @ 04:15]  HBsAg Nonreact      [11-11-22 @ 04:15]  HBcAb Nonreact      [11-11-22 @ 04:15]  HCV 0.10, Nonreact      [11-15-22 @ 06:47]    ANCA: cANCA Negative, pANCA Negative, atypical ANCA Indeterminate Method interference due to CATHERINE Fluorescence      [11-10-22 @ 06:55]  MPO-ANCA <5.0, interpretation: Negative      [11-10-22 @ 06:55]  PR3-ANCA <5.0, interpretation: Negative      [11-10-22 @ 06:55]    RADIOLOGY & ADDITIONAL STUDIES:

## 2022-11-25 NOTE — PROGRESS NOTE ADULT - ASSESSMENT
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 Patient is admitted to be optimized for her surgical procedure.    Plan: OR tomorrow for RVATS, RUL Nodule Biopsy w/ IR Marking.    1: hx of lymphoma: DBLCL  2: Pleural effusion :  3: HLD:   '4: ANCA ASSOCIATED VASCULITIS      11/17:    1: hx of lymphoma: DBLCL: S/P SURGERY RUL wedge resection and LN biopsy : await results:   2: Pleural effusion : not much of chest xray  : but ct scan chest revelas more pleurl effusion on rt sdie then left:  send for chem and cultures   3: HLD: : per PMD  '4: ANCA ASSOCIATED VASCULITIS : sHE HAD FEVER:  RHEUMATOLOGY FOLLOWING ON ANTIBIOTICS:    DW THORACIC     11/18:    1: hx of lymphoma: DBLCL: S/P SURGERY RUL wedge resection and LN biopsy : await results:   2: Pleural effusion : not much of chest xray  : but ct scan chest reveals more pleural effusion on rt side then left: s/p thoracentesis : sent for cultures:  has exudative effusion ? malignancy ? lymphoma  3: HLD: : per PMD  '4: ANCA ASSOCIATED VASCULITIS : ssHE HAD FEVER:  RHEUMATOLOGY FOLLOWING ON ANTIBIOTICS: AFEBRILE IN LAST 24 HOURS AND IS ON CEFEPIME:     DW THORACIC AND ACP     11/20:      1: hx of lymphoma: DBLCL: S/P SURGERY RUL wedge resection and LN biopsy : await results:   2: Pleural effusion : not much of chest xray  : but ct scan chest reveals more pleural effusion on rt side then left: s/p thoracentesis : sent for cultures:  has exudative effusion ? malignancy ? lymphoma : await flow cyto   3: HLD: : per PMD  '4: ANCA ASSOCIATED VASCULITIS : ssHE HAD FEVER:  RHEUMATOLOGY FOLLOWING ON ANTIBIOTICS: AFEBRILE IN LAST 72 HOURS AND IS ON CEFEPIME:     DW  ACP     11/21:      1: hx of lymphoma: DBLCL: S/P SURGERY RUL wedge resection and LN biopsy : await results:   2: Pleural effusion : not much of chest xray  : but ct scan chest reveals more pleural effusion on rt side then left: s/p thoracentesis : sent for cultures:  has exudative effusion ? malignancy ? lymphoma : await flow cyto   3: HLD: : per PMD  '4: ANCA ASSOCIATED VASCULITIS : SHE HAD FEVER:  RHEUMATOLOGY FOLLOWING ON ANTIBIOTICS: AFEBRILE IN LAST 72 HOURS AND IS still off CEFEPIME: now  5: now with covid : on remdesivir:  : she is on  2 L of oxygen : but on record she did not desaturate too much      DW  ACP     11/22    1: hx of lymphoma: DBLCL: S/P SURGERY RUL wedge resection and LN biopsy : await results:   2: Pleural effusion : not much of chest xray  : but ct scan chest reveals more pleural effusion on rt side then left: s/p thoracentesis : sent for cultures:  has exudative effusion ? malignancy ? lymphoma : await flow cyto   3: HLD: : per PMD  '4: ANCA ASSOCIATED VASCULITIS : SHE HAD FEVER:  RHEUMATOLOGY FOLLOWING ON ANTIBIOTICS: AFEBRILE IN LAST 72 HOURS AND IS  off CEFEPIME: now  5: now with covid : on remdesivir:  : she is on  2 L of oxygen : but on record she did not desaturate too much  : she is on it for comfort:  demi acp : to remove oxygen and see her real o2 sao2: HER D DIMER NEEDS TO BE CHECKED    dw acp    11/23:      1: hx of lymphoma: DBLCL: S/P SURGERY RUL wedge resection and LN biopsy : await results:   2: Pleural effusion : not much of chest xray  : but ct scan chest reveals more pleural effusion on rt side then left: s/p thoracentesis : sent for cultures:  has exudative effusion ? malignancy ? lymphoma : await flow cyto   3: HLD: : per PMD  '4: ANCA ASSOCIATED VASCULITIS : SHE HAD FEVER:  RHEUMATOLOGY FOLLOWING ON ANTIBIOTICS: AFEBRILE IN LAST 72 HOURS AND IS  off CEFEPIME: now  5: now with covid : on remdesivir:  : she is on  2 L of oxygen : but on record she did not desaturate too much  : she is on it for comfort:  demi acp : to remove oxygen and see her real o2 sao2: HER D DIMER is high   ut she already had negative pe:     dw acp    11/24:    1: hx of lymphoma: DBLCL: S/P SURGERY RUL wedge resection and LN biopsy : await results:   2: Pleural effusion : not much of chest xray  : but ct scan chest reveals more pleural effusion on rt side then left: s/p thoracentesis : sent for cultures:  has exudative effusion ? malignancy ? lymphoma :   3: HLD: : per PMD  '4: ANCA ASSOCIATED VASCULITIS : SHE HAD FEVER:  RHEUMATOLOGY FOLLOWING ON ANTIBIOTICS: AFEBRILE IN LAST 72 HOURS AND IS  off CEFEPIME: now  5: now with covid : on remdesivir still   : she is on  2 L of oxygen : but on record she did not desaturate too much  : she is on it for comfort:  dw acp : to remove oxygen and see her real o2 sao2: HER D DIMER is high   but she already had negative pe: not on steroids per ID     dw acp    11/25:    1: hx of lymphoma: DBLCL: S/P SURGERY RUL wedge resection and LN biopsy : previous ebus biopsy was negative:  no granulomas reproted  2: Pleural effusion : not much of chest xray  : but ct scan chest reveals more pleural effusion on rt side then left: s/p thoracentesis : sent for cultures:  has exudative effusion ? malignancy ? lymphoma :   3: HLD: : per PMD  '4: ANCA ASSOCIATED VASCULITIS : SHE HAD FEVER:  RHEUMATOLOGY FOLLOWING ON ANTIBIOTICS: AFEBRILE IN LAST 72 HOURS AND IS  off CEFEPIME: now  5: now with covid : on remdesivir still   : she is on  2 L of oxygen : but on record she did not desaturate too much  : she is on it for comfort:  dw acp : to remove oxygen and see her real o2 sao2: HER D DIMER is high   but she already had negative pe: not on steroids per ID     dw acp

## 2022-11-25 NOTE — PROGRESS NOTE ADULT - SUBJECTIVE AND OBJECTIVE BOX
[facility], Division of Infectious Diseases  ANDREW Rodrígeuz Y. Patel, S. Shah, G. Bebeto  [phone]  ([phone] - weekdays after 5pm and weekends)    Name: BETTIE PRATER  Age/Gender: 81y Female  MRN: [id number]    Interval History:  Patient seen this morning.   Notes reviewed.   Afebrile.   denies SOB, abd pain, dysuria    Allergies: codeine (Nausea)  doxycycline (Nausea)  penicillin (Hives)      Objective:  Vitals:   T(F): 97.3 (11-25-22 @ 06:15), Max: 98.1 (11-24-22 @ 22:38)  HR: 100 (11-25-22 @ 06:15) (90 - 100)  BP: 143/64 (11-25-22 @ 06:15) (115/51 - 143/64)  RR: 17 (11-25-22 @ 06:15) (17 - 18)  SpO2: 100% (11-25-22 @ 06:15) (95% - 100%)  Physical Examination:  General: no acute distress  HEENT: anicteric, NC  Cardio: normal rate, L chest mediport  Resp: mild wheezes R lung field, L lung clear to ausculation  Abd: soft, NT, ND  Ext: no LE edema  Skin: warm, dry    Laboratory Studies:  CBC:                       9.5    8.88  )-----------( 253      ( 25 Nov 2022 06:00 )             30.5     WBC Trend:  8.88 11-25-22 @ 06:00  8.88 11-24-22 @ 02:28  11.71 11-23-22 @ 06:07  22.07 11-22-22 @ 07:48  16.19 11-21-22 @ 17:12  12.82 11-21-22 @ 04:43  11.47 11-20-22 @ 06:04  12.83 11-19-22 @ 05:35    CMP: 11-25    138  |  107  |  13  ----------------------------<  102<H>  4.4   |  23  |  0.48<L>    Ca    7.9<L>      25 Nov 2022 06:00  Phos  2.0     11-25  Mg     1.90     11-25    TPro  5.6<L>  /  Alb  2.4<L>  /  TBili  0.6  /  DBili  x   /  AST  15  /  ALT  <5  /  AlkPhos  71  11-25      LIVER FUNCTIONS - ( 25 Nov 2022 06:00 )  Alb: 2.4 g/dL / Pro: 5.6 g/dL / ALK PHOS: 71 U/L / ALT: <5 U/L / AST: 15 U/L / GGT: x               Microbiology: reviewed     Culture - Blood (collected 11-18-22 @ 09:45)  Source: .Blood Blood-Peripheral  Final Report (11-23-22 @ 13:01):    No Growth Final    Culture - Blood (collected 11-18-22 @ 06:00)  Source: .Blood Blood-Peripheral  Final Report (11-23-22 @ 13:01):    No Growth Final    Culture - Body Fluid with Gram Stain (collected 11-17-22 @ 16:04)  Source: Pleural Fl Pleural Fluid  Gram Stain (11-17-22 @ 22:06):    polymorphonuclear leukocytes seen    No organisms seen    by cytocentrifuge  Final Report (11-22-22 @ 18:19):    No growth at 5 days    Culture - Blood (collected 11-14-22 @ 23:17)  Source: .Blood Blood-Peripheral  Final Report (11-20-22 @ 07:00):    No Growth Final    Culture - Blood (collected 11-14-22 @ 21:40)  Source: .Blood Blood-Peripheral  Final Report (11-20-22 @ 04:00):    No Growth Final        Radiology: reviewed     Medications:  acetaminophen     Tablet .. 650 milliGRAM(s) Oral every 6 hours PRN  acetaminophen   IVPB .. 750 milliGRAM(s) IV Intermittent once  benzonatate 200 milliGRAM(s) Oral three times a day PRN  citalopram 10 milliGRAM(s) Oral daily  fluticasone propionate 50 MICROgram(s)/spray Nasal Spray 1 Spray(s) Both Nostrils two times a day  folic acid 1 milliGRAM(s) Oral daily  guaiFENesin  milliGRAM(s) Oral every 12 hours  heparin   Injectable 5000 Unit(s) SubCutaneous every 12 hours  levalbuterol 45 MICROgram(s) HFA Inhaler 2 Puff(s) Inhalation every 6 hours  lidocaine   4% Patch 1 Patch Transdermal daily  melatonin 3 milliGRAM(s) Oral at bedtime  metoprolol tartrate 37.5 milliGRAM(s) Oral two times a day  naloxone Injectable 0.1 milliGRAM(s) IV Push every 3 minutes PRN  ondansetron Injectable 4 milliGRAM(s) IV Push every 6 hours PRN  pantoprazole    Tablet 40 milliGRAM(s) Oral before breakfast  polyethylene glycol 3350 17 Gram(s) Oral daily  potassium phosphate / sodium phosphate Tablet (K-PHOS No. 2) 1 Tablet(s) Oral three times a day with meals  remdesivir  IVPB 100 milliGRAM(s) IV Intermittent <User Schedule>  senna 1 Tablet(s) Oral daily  sodium bicarbonate 650 milliGRAM(s) Oral three times a day    Antimicrobials:  remdesivir  IVPB 100 milliGRAM(s) IV Intermittent <User Schedule>

## 2022-11-25 NOTE — CHART NOTE - NSCHARTNOTEFT_GEN_A_CORE
Elevated D-Dimer likely inflammatory from COVID and vasculitis, discussed with attending to repeat LE duplex. HSQ changed to Lovenox PPX

## 2022-11-25 NOTE — PROGRESS NOTE ADULT - SUBJECTIVE AND OBJECTIVE BOX
SUBJECTIVE/ OVERNIGHT EVENTS:  ferritin CRP downtrending  d-dimer uptrending  Hep SQ BID switch to Lovenox SQ  Cr normal  pt states she feels better  no cp, no sob, no n/v/d. no abdominal pain.  no headache, no dizziness.       --------------------------------------------------------------------------------------------  LABS:                        9.5    8.88  )-----------( 253      ( 25 Nov 2022 06:00 )             30.5     11-25    138  |  107  |  13  ----------------------------<  102<H>  4.4   |  23  |  0.48<L>    Ca    7.9<L>      25 Nov 2022 06:00  Phos  2.0     11-25  Mg     1.90     11-25    TPro  5.6<L>  /  Alb  2.4<L>  /  TBili  0.6  /  DBili  x   /  AST  15  /  ALT  <5  /  AlkPhos  71  11-25      CAPILLARY BLOOD GLUCOSE                RADIOLOGY & ADDITIONAL TESTS:    Imaging Personally Reviewed:  [x] YES  [ ] NO    Consultant(s) Notes Reviewed:  [x] YES  [ ] NO    MEDICATIONS  (STANDING):  acetaminophen   IVPB .. 750 milliGRAM(s) IV Intermittent once  citalopram 10 milliGRAM(s) Oral daily  enoxaparin Injectable 40 milliGRAM(s) SubCutaneous every 24 hours  fluticasone propionate 50 MICROgram(s)/spray Nasal Spray 1 Spray(s) Both Nostrils two times a day  folic acid 1 milliGRAM(s) Oral daily  guaiFENesin  milliGRAM(s) Oral every 12 hours  levalbuterol 45 MICROgram(s) HFA Inhaler 2 Puff(s) Inhalation every 6 hours  lidocaine   4% Patch 1 Patch Transdermal daily  melatonin 3 milliGRAM(s) Oral at bedtime  metoprolol tartrate 50 milliGRAM(s) Oral two times a day  pantoprazole    Tablet 40 milliGRAM(s) Oral before breakfast  polyethylene glycol 3350 17 Gram(s) Oral daily  potassium phosphate / sodium phosphate Tablet (K-PHOS No. 2) 1 Tablet(s) Oral three times a day with meals  senna 1 Tablet(s) Oral daily  sodium bicarbonate 650 milliGRAM(s) Oral three times a day    MEDICATIONS  (PRN):  acetaminophen     Tablet .. 650 milliGRAM(s) Oral every 6 hours PRN Mild Pain (1 - 3)  benzonatate 200 milliGRAM(s) Oral three times a day PRN Cough  naloxone Injectable 0.1 milliGRAM(s) IV Push every 3 minutes PRN For ANY of the following changes in patient status:  A. RR LESS THAN 10 breaths per minute, B. Oxygen saturation LESS THAN 90%, C. Sedation score of 6  ondansetron Injectable 4 milliGRAM(s) IV Push every 6 hours PRN Nausea      Care Discussed with Consultants/Other Providers [x] YES  [ ] NO    Vital Signs Last 24 Hrs  T(C): 36.6 (25 Nov 2022 18:15), Max: 36.7 (24 Nov 2022 22:38)  T(F): 97.9 (25 Nov 2022 18:15), Max: 98.1 (24 Nov 2022 22:38)  HR: 117 (25 Nov 2022 18:15) (82 - 117)  BP: 145/64 (25 Nov 2022 18:15) (119/53 - 145/64)  BP(mean): --  RR: 19 (25 Nov 2022 18:15) (17 - 19)  SpO2: 97% (25 Nov 2022 18:15) (96% - 100%)    Parameters below as of 25 Nov 2022 18:15  Patient On (Oxygen Delivery Method): room air  O2 Flow (L/min): 1    I&O's Summary          PHYSICAL EXAM:  GENERAL: NAD, thin-elderly, comfortable on nasal canula.   HEAD:  Atraumatic, Normocephalic  EYES: EOMI, PERRLA, conjunctiva and sclera clear  NECK: Supple, No JVD  CHEST/LUNG: mild decrease breath sounds bilaterally; No wheeze   HEART: Regular rate and rhythm; No murmurs, rubs, or gallops  ABDOMEN: Soft, Nontender, Nondistended; Bowel sounds present  Neuro: AAOx3, no focal weakness   EXTREMITIES:  2+ Peripheral Pulses, No clubbing, cyanosis, trace edema

## 2022-11-25 NOTE — PROGRESS NOTE ADULT - SUBJECTIVE AND OBJECTIVE BOX
CARDIOLOGY FOLLOW UP - Dr. Hooker  Date of Service: 11/25/2022  CC: SVT earlier, no complaints    Review of Systems:  Constitutional: No fever, weight loss, or fatigue  Respiratory: No cough, wheezing, or hemoptysis, no shortness of breath  Cardiovascular: No chest pain, palpitations, passing out, dizziness, or leg swelling  Gastrointestinal: No abd or epigastric pain. No nausea, vomiting, or hematemesis; no diarrhea or consiptaiton, no melena or hematochezia  Vascular: No edema     TELEMETRY:    PHYSICAL EXAM:  T(C): 36.3 (11-25-22 @ 06:15), Max: 36.7 (11-24-22 @ 22:38)  HR: 100 (11-25-22 @ 06:15) (90 - 100)  BP: 143/64 (11-25-22 @ 06:15) (115/51 - 143/64)  RR: 17 (11-25-22 @ 06:15) (17 - 18)  SpO2: 100% (11-25-22 @ 06:15) (95% - 100%)  Wt(kg): --  I&O's Summary      Appearance: Normal	  Cardiovascular: Normal S1 S2,RRR, No JVD, No murmurs  Respiratory: Lungs clear to auscultation	  Gastrointestinal:  Soft, Non-tender, + BS	  Extremities: Normal range of motion, No clubbing, cyanosis or edema  Vascular: Peripheral pulses palpable 2+ bilaterally       Home Medications:  citalopram 10 mg oral tablet: 1 tab(s) orally once a day (04 Nov 2022 15:16)  ezetimibe 10 mg oral tablet: 1 tab(s) orally once a day (04 Nov 2022 15:16)  folic acid 1 mg oral tablet: 1 tab(s) orally once a day (04 Nov 2022 15:16)  omeprazole 20 mg oral delayed release capsule: 1 cap(s) orally prn (04 Nov 2022 15:16)  Sodium Chloride 1 g oral tablet: daily (04 Nov 2022 15:16)        MEDICATIONS  (STANDING):  acetaminophen   IVPB .. 750 milliGRAM(s) IV Intermittent once  citalopram 10 milliGRAM(s) Oral daily  fluticasone propionate 50 MICROgram(s)/spray Nasal Spray 1 Spray(s) Both Nostrils two times a day  folic acid 1 milliGRAM(s) Oral daily  guaiFENesin  milliGRAM(s) Oral every 12 hours  heparin   Injectable 5000 Unit(s) SubCutaneous every 12 hours  levalbuterol 45 MICROgram(s) HFA Inhaler 2 Puff(s) Inhalation every 6 hours  lidocaine   4% Patch 1 Patch Transdermal daily  melatonin 3 milliGRAM(s) Oral at bedtime  metoprolol tartrate 37.5 milliGRAM(s) Oral two times a day  pantoprazole    Tablet 40 milliGRAM(s) Oral before breakfast  polyethylene glycol 3350 17 Gram(s) Oral daily  potassium phosphate / sodium phosphate Tablet (K-PHOS No. 2) 1 Tablet(s) Oral three times a day with meals  remdesivir  IVPB 100 milliGRAM(s) IV Intermittent <User Schedule>  senna 1 Tablet(s) Oral daily  sodium bicarbonate 650 milliGRAM(s) Oral three times a day        EKG:  RADIOLOGY:  DIAGNOSTIC TESTING:  [ ] Echocardiogram:  [ ] Catherterization:  [ ] Stress Test:  OTHER:     LABS:	 	                          9.5    8.88  )-----------( 253      ( 25 Nov 2022 06:00 )             30.5     11-25    138  |  107  |  13  ----------------------------<  102<H>  4.4   |  23  |  0.48<L>    Ca    7.9<L>      25 Nov 2022 06:00  Phos  2.0     11-25  Mg     1.90     11-25    TPro  5.6<L>  /  Alb  2.4<L>  /  TBili  0.6  /  DBili  x   /  AST  15  /  ALT  <5  /  AlkPhos  71  11-25          CARDIAC MARKERS:

## 2022-11-26 NOTE — PROGRESS NOTE ADULT - ASSESSMENT
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 Patient states that recently she has been feeling very fatigued.    Fatigue/dyspnea, with hx of Lymphoma  - Recent TTE on 11/3/22 reviewed: normal LV. outpt card Dr. Ady Cooper  - PT also saw pulm Mack Tucker in the office, with some concern for her overall status. She was hypotensive to systolic 90s in the office.  (now BP improves s/p IVF here).   - s/p thoracoscopic biopsy of mediastinal lymph node and Lung wedge resection 11/10/22  - path results favoring vasculitis, but final stains to r/o infection are still pending; no evidence for lymphoma  - 11/22/22 a pt felt more SOB early morning hours, placed on 2LNCO2, CXR  some moderate interstitial edema. ordered IV Lasix 20 mg x 1  - comfortable on nasal canula, better post diuretic. wean O2 as tolerates   - appreciate rheum, heme-onc f/u  - no plan for immunosuppressants while being treated for COVID19    Wheezing today 11/26  - comfortable on nasal canula  - started Decadron 6 mg qdaily IV for covid. repeat CXR ordered  - d/w rehum and pulm   - will repeat CXR  - Lasix 20 mg x 1     Fever: either 2' ANCA-mediated vasculitis vs infection  - UA, CXR unimpressive. RVP neg.   - no leukocytosis, remain stable clinically  - procal low.  - LE venous dopplers (-) for DVT  - s/p rt thoracentesis 11/17/22  - cefepime started 11/18/22 following worsened leukocytosis 11/18/22   - blood cxs 11/18/22 --> (-)  - cefepime stopped 11/22/22  - ID appreciated  - rheum f/u in regards to steroids     (+) COVID-19 11/21/22  - Remdesivir 11/21/22-->11/23/22  - steroid initiation per rheumatology service and ID. holding for now.   - monitoring inflammatory markers  - ferritin, CRP downtrending  - d-dimer uptrending  - Hep SQ BID switch to Lovenox SQ      Rt pleural effusion  - CTA chest 11/16/22 revealed moderate rt effusion  - s/p 650 cc U/S-guided rt thoracentesis 11/17/22  - exudative but no neutrophilic predominance and initial Gram stain w/o organisms  - cultures neg.   - ID/ pulmonology consult appreciated  - monitoring off abx     Tachycardia likely 2' fever  - cont low-dose metoprolol  - card consulted (Dr. Hooker) appreciated    Anemia, unspecified  - hgb 8.1 lower than her baseline.  - no melena, no hematochezia. no BRBPR.   - recent Anemia work-up reviewed: normal vit b12, folate.  - repeat iron studies ordered    - s/p 2 units pRBC 11/1/22 per heme's note.   - s/p 1 unit pRBC 11/9/22 with appropriate post transfusion hgb.    - no melena, no hematochezia. no BRBPR.   - 11/21/22 --> s/p another unit of pRBC  - Lasix 20 mg PRN     Anxiety and depression  - stable, continue citalopram.  - Pt denied SI/HI ideations, denied visual and auditory hallucinations.     GERD (gastroesophageal reflux disease)  - continue PPI    Hyperlipidemia.   - continue home ezetimibe. okay to hold if nonformulary   - Lipid panel    DVT ppx   - SC heparin --> Lovenox SQ    Disposition  - PT: rehab. Pt now agreeable to rehab

## 2022-11-26 NOTE — PROGRESS NOTE ADULT - NSPROGADDITIONALINFOA_GEN_ALL_CORE
d/w the son Levon. Plan updated 11/24/22. the son Levon wants Rheum follow up in regards to starting steroids.   no plan for immunosuppresants per rheum. however, okay with steroids. started decadron for covid. see above.     monitor D-dimer. will repeat tomorrow am.    d/w the tran Dos Santos. plan updated again 11/26/22.     - Dr. MARY Arias ([facility])  - [phone]

## 2022-11-26 NOTE — PROGRESS NOTE ADULT - SUBJECTIVE AND OBJECTIVE BOX
[facility]   MD RONEL FRIAS MD KRISTINE SOLTANPOUR, DO ANGELA WONG, PA    TEL:  OFFICE: [phone]  From 5pm-7am Answering Service [phone]    -- RENAL FOLLOW UP NOTE ---Date of Service 11-26-22 @ 11:51    Patient is a 81y old  Female who presents with a chief complaint of RVATS, RUL Nodule Biopsy w/ IR marking (25 Nov 2022 15:12)      Patient seen and examined at bedside. +cough    VITALS:  T(F): 98.4 (11-26-22 @ 11:48), Max: 98.4 (11-26-22 @ 11:48)  HR: 109 (11-26-22 @ 11:48)  BP: 152/70 (11-26-22 @ 11:48)  RR: 17 (11-26-22 @ 11:48)  SpO2: 98% (11-26-22 @ 11:48)  Wt(kg): --        PHYSICAL EXAM:  General: NAD  Neck: No JVD  Respiratory: +or rhonchi  Cardiovascular: S1, S2, RRR  Gastrointestinal: BS+, soft, NT/ND  Extremities: No peripheral edema    Hospital Medications:   MEDICATIONS  (STANDING):  acetaminophen   IVPB .. 750 milliGRAM(s) IV Intermittent once  citalopram 10 milliGRAM(s) Oral daily  enoxaparin Injectable 40 milliGRAM(s) SubCutaneous every 24 hours  fluticasone propionate 50 MICROgram(s)/spray Nasal Spray 1 Spray(s) Both Nostrils two times a day  folic acid 1 milliGRAM(s) Oral daily  guaiFENesin  milliGRAM(s) Oral every 12 hours  levalbuterol 45 MICROgram(s) HFA Inhaler 2 Puff(s) Inhalation every 6 hours  lidocaine   4% Patch 1 Patch Transdermal daily  melatonin 3 milliGRAM(s) Oral at bedtime  metoprolol tartrate 50 milliGRAM(s) Oral two times a day  pantoprazole    Tablet 40 milliGRAM(s) Oral before breakfast  polyethylene glycol 3350 17 Gram(s) Oral daily  senna 1 Tablet(s) Oral daily  sodium bicarbonate 650 milliGRAM(s) Oral three times a day      LABS:  11-26    137  |  105  |  14  ----------------------------<  133<H>  4.1   |  21<L>  |  0.47<L>    Ca    7.8<L>      26 Nov 2022 06:56  Phos  3.3     11-26  Mg     1.80     11-26    TPro  5.9<L>  /  Alb  2.4<L>  /  TBili  0.7  /  DBili      /  AST  18  /  ALT  5   /  AlkPhos  64  11-26    Creatinine Trend: 0.47 <--, 0.48 <--, 0.47 <--, 0.45 <--, 0.46 <--, 0.49 <--, 0.62 <--, 0.55 <--    Albumin, Serum: 2.4 g/dL (11-26 @ 06:56)  Phosphorus Level, Serum: 3.3 mg/dL (11-26 @ 06:56)                              9.0    8.53  )-----------( 274      ( 26 Nov 2022 06:56 )             28.9     Urine Studies:  Urinalysis - [11-17-22 @ 20:04]      Color Judy / Appearance Clear / SG 1.044 / pH 6.5      Gluc Negative / Ketone Small  / Bili Small / Urobili >12 mg/dL       Blood Small / Protein 100 mg/dL / Leuk Est Negative / Nitrite Negative      RBC 12 / WBC 11 / Hyaline 9 / Gran  / Sq Epi  / Non Sq Epi 11 / Bacteria Few      Iron 97, TIBC <127, %sat --      [11-10-22 @ 06:55]  Ferritin 1570      [11-25-22 @ 06:00]  Vitamin D (25OH) 38.3      [11-15-22 @ 06:47]  Lipid: chol 84, TG 81, HDL 22, LDL --      [10-01-22 @ 07:10]    HBsAb Nonreact      [11-11-22 @ 04:15]  HBsAg Nonreact      [11-11-22 @ 04:15]  HBcAb Nonreact      [11-11-22 @ 04:15]  HCV 0.10, Nonreact      [11-15-22 @ 06:47]    ANCA: cANCA Negative, pANCA Negative, atypical ANCA Indeterminate Method interference due to CATHERINE Fluorescence      [11-10-22 @ 06:55]  MPO-ANCA <5.0, interpretation: Negative      [11-10-22 @ 06:55]  PR3-ANCA <5.0, interpretation: Negative      [11-10-22 @ 06:55]    RADIOLOGY & ADDITIONAL STUDIES:

## 2022-11-26 NOTE — PROGRESS NOTE ADULT - ASSESSMENT
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 Patient states that recently she has been feeling very fatigued. She states that moving around her house, or even to the bathroom, has been causing her to get very tired and causes her some minor chest pain, which resolves quickly with rest. She states that her breathing has been non-labored, denies dyspnea, shortness of breath, inability to catch her breath. She states that she has also not had much of an appetite over the last month and has been eating mostly soft foods. She admits to about a 5lb weight loss over the last month. She admits to a minor cough that occasionally occurs in fits and makes her feel as if she may vomit. Patient admits to some nausea and aversion to food but denies mechanical issues, inability to eat or feeling of choking. Denies hematemesis, hemoptysis.   (09 Nov 2022 12:31)      Hyponatremia  resolved  Likely SIADH  free water restriction <1L/day  improved  monitor    Hypophosphatemia  supplement as needed  Check phosphorus daily.     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 Patient states that recently she has been feeling very fatigued. She states that moving around her house, or even to the bathroom, has been causing her to get very tired and causes her some minor chest pain, which resolves quickly with rest. She states that her breathing has been non-labored, denies dyspnea, shortness of breath, inability to catch her breath. She states that she has also not had much of an appetite over the last month and has been eating mostly soft foods. She admits to about a 5lb weight loss over the last month. She admits to a minor cough that occasionally occurs in fits and makes her feel as if she may vomit. Patient admits to some nausea and aversion to food but denies mechanical issues, inability to eat or feeling of choking. Denies hematemesis, hemoptysis.   (09 Nov 2022 12:31)      Hyponatremia  resolved  Likely SIADH  free water restriction <1L/day  improved  monitor    Hypophosphatemia  supplement as needed  Check phosphorus daily.     Anemia  Defer to hematology               hypocalcemia  sec to low alb  vit d 1,25 ok  Monitor

## 2022-11-26 NOTE — PROGRESS NOTE ADULT - SUBJECTIVE AND OBJECTIVE BOX
SUBJECTIVE/ OVERNIGHT EVENTS:  COVID+  noted wheezing today  she remains comfortable  on nasal canula  started Decadron IV for covid. repeat CXR ordered  d/w rheum and pulm  will also give 1 dose of Lasix 20 mg IV x 1.   no cp, no sob, no n/v/d. no abdominal pain.  no headache, no dizziness.   states she feels stronger today.        --------------------------------------------------------------------------------------------  LABS:                        9.0    8.53  )-----------( 274      ( 26 Nov 2022 06:56 )             28.9     11-26    137  |  105  |  14  ----------------------------<  133<H>  4.1   |  21<L>  |  0.47<L>    Ca    7.8<L>      26 Nov 2022 06:56  Phos  3.3     11-26  Mg     1.80     11-26    TPro  5.9<L>  /  Alb  2.4<L>  /  TBili  0.7  /  DBili  x   /  AST  18  /  ALT  5   /  AlkPhos  64  11-26      CAPILLARY BLOOD GLUCOSE                RADIOLOGY & ADDITIONAL TESTS:    Imaging Personally Reviewed:  [x] YES  [ ] NO    Consultant(s) Notes Reviewed:  [x] YES  [ ] NO    MEDICATIONS  (STANDING):  acetaminophen   IVPB .. 750 milliGRAM(s) IV Intermittent once  albuterol/ipratropium for Nebulization 3 milliLiter(s) Nebulizer every 6 hours  citalopram 10 milliGRAM(s) Oral daily  dexAMETHasone  Injectable 6 milliGRAM(s) IV Push daily  enoxaparin Injectable 40 milliGRAM(s) SubCutaneous every 24 hours  fluticasone propionate 50 MICROgram(s)/spray Nasal Spray 1 Spray(s) Both Nostrils two times a day  folic acid 1 milliGRAM(s) Oral daily  guaiFENesin  milliGRAM(s) Oral every 12 hours  levalbuterol 45 MICROgram(s) HFA Inhaler 2 Puff(s) Inhalation every 6 hours  lidocaine   4% Patch 1 Patch Transdermal daily  melatonin 3 milliGRAM(s) Oral at bedtime  metoprolol tartrate 50 milliGRAM(s) Oral two times a day  pantoprazole    Tablet 40 milliGRAM(s) Oral before breakfast  polyethylene glycol 3350 17 Gram(s) Oral daily  senna 1 Tablet(s) Oral daily  sodium bicarbonate 650 milliGRAM(s) Oral three times a day    MEDICATIONS  (PRN):  acetaminophen     Tablet .. 650 milliGRAM(s) Oral every 6 hours PRN Mild Pain (1 - 3)  benzonatate 200 milliGRAM(s) Oral three times a day PRN Cough  naloxone Injectable 0.1 milliGRAM(s) IV Push every 3 minutes PRN For ANY of the following changes in patient status:  A. RR LESS THAN 10 breaths per minute, B. Oxygen saturation LESS THAN 90%, C. Sedation score of 6  ondansetron Injectable 4 milliGRAM(s) IV Push every 6 hours PRN Nausea      Care Discussed with Consultants/Other Providers [x] YES  [ ] NO    Vital Signs Last 24 Hrs  T(C): 36.9 (26 Nov 2022 11:48), Max: 36.9 (26 Nov 2022 11:48)  T(F): 98.4 (26 Nov 2022 11:48), Max: 98.4 (26 Nov 2022 11:48)  HR: 109 (26 Nov 2022 11:48) (105 - 117)  BP: 152/70 (26 Nov 2022 11:48) (140/61 - 152/70)  BP(mean): --  RR: 17 (26 Nov 2022 11:48) (17 - 19)  SpO2: 98% (26 Nov 2022 11:48) (95% - 98%)    Parameters below as of 26 Nov 2022 11:48  Patient On (Oxygen Delivery Method): nasal cannula  O2 Flow (L/min): 1    I&O's Summary        PHYSICAL EXAM:  GENERAL: NAD, thin-elderly, comfortable on nasal canula.   HEAD:  Atraumatic, Normocephalic  EYES: EOMI, PERRLA, conjunctiva and sclera clear  NECK: Supple, No JVD  CHEST/LUNG: mild decrease breath sounds bilaterally; + wheeze   HEART: Regular rate and rhythm; No murmurs, rubs, or gallops  ABDOMEN: Soft, Nontender, Nondistended; Bowel sounds present  Neuro: AAOx3, no focal weakness   EXTREMITIES:  2+ Peripheral Pulses, No clubbing, cyanosis, trace edema

## 2022-11-27 NOTE — PROGRESS NOTE ADULT - SUBJECTIVE AND OBJECTIVE BOX
SUBJECTIVE/ OVERNIGHT EVENTS:  clinically feels better  still with faint wheeze  on decadron already  no cp, no sob, no n/v/d. no abdominal pain.  no headache, no dizziness.     --------------------------------------------------------------------------------------------  LABS:                        8.8    5.50  )-----------( 203      ( 27 Nov 2022 06:54 )             28.9     11-27    144  |  107  |  19  ----------------------------<  166<H>  4.2   |  25  |  0.48<L>    Ca    8.0<L>      27 Nov 2022 06:54  Phos  3.6     11-27  Mg     2.00     11-27    TPro  5.8<L>  /  Alb  2.5<L>  /  TBili  0.6  /  DBili  x   /  AST  11  /  ALT  <5<L>  /  AlkPhos  55  11-27      CAPILLARY BLOOD GLUCOSE                RADIOLOGY & ADDITIONAL TESTS:    Imaging Personally Reviewed:  [x] YES  [ ] NO    Consultant(s) Notes Reviewed:  [x] YES  [ ] NO    MEDICATIONS  (STANDING):  acetaminophen   IVPB .. 750 milliGRAM(s) IV Intermittent once  albuterol/ipratropium for Nebulization 3 milliLiter(s) Nebulizer every 6 hours  citalopram 10 milliGRAM(s) Oral daily  dexAMETHasone  Injectable 6 milliGRAM(s) IV Push daily  enoxaparin Injectable 40 milliGRAM(s) SubCutaneous every 24 hours  fluticasone propionate 50 MICROgram(s)/spray Nasal Spray 1 Spray(s) Both Nostrils two times a day  folic acid 1 milliGRAM(s) Oral daily  guaiFENesin  milliGRAM(s) Oral every 12 hours  levalbuterol 45 MICROgram(s) HFA Inhaler 2 Puff(s) Inhalation every 6 hours  lidocaine   4% Patch 1 Patch Transdermal daily  melatonin 3 milliGRAM(s) Oral at bedtime  metoprolol tartrate 50 milliGRAM(s) Oral two times a day  pantoprazole    Tablet 40 milliGRAM(s) Oral before breakfast  polyethylene glycol 3350 17 Gram(s) Oral daily  senna 1 Tablet(s) Oral daily  sodium bicarbonate 650 milliGRAM(s) Oral three times a day    MEDICATIONS  (PRN):  acetaminophen     Tablet .. 650 milliGRAM(s) Oral every 6 hours PRN Mild Pain (1 - 3)  benzonatate 200 milliGRAM(s) Oral three times a day PRN Cough  naloxone Injectable 0.1 milliGRAM(s) IV Push every 3 minutes PRN For ANY of the following changes in patient status:  A. RR LESS THAN 10 breaths per minute, B. Oxygen saturation LESS THAN 90%, C. Sedation score of 6  ondansetron Injectable 4 milliGRAM(s) IV Push every 6 hours PRN Nausea      Care Discussed with Consultants/Other Providers [x] YES  [ ] NO    Vital Signs Last 24 Hrs  T(C): 36.3 (27 Nov 2022 21:04), Max: 36.7 (27 Nov 2022 18:13)  T(F): 97.4 (27 Nov 2022 21:04), Max: 98.1 (27 Nov 2022 18:13)  HR: 73 (27 Nov 2022 21:04) (45 - 98)  BP: 128/50 (27 Nov 2022 21:04) (123/71 - 140/78)  BP(mean): --  RR: 17 (27 Nov 2022 21:04) (16 - 19)  SpO2: 96% (27 Nov 2022 21:04) (94% - 97%)    Parameters below as of 27 Nov 2022 21:04  Patient On (Oxygen Delivery Method): nasal cannula  O2 Flow (L/min): 2    I&O's Summary          PHYSICAL EXAM:  GENERAL: NAD, thin-elderly, comfortable on nasal canula.   HEAD:  Atraumatic, Normocephalic  EYES: EOMI, PERRLA, conjunctiva and sclera clear  NECK: Supple, No JVD  CHEST/LUNG: mild decrease breath sounds bilaterally; + wheeze   HEART: Regular rate and rhythm; No murmurs, rubs, or gallops  ABDOMEN: Soft, Nontender, Nondistended; Bowel sounds present  Neuro: AAOx3, no focal weakness   EXTREMITIES:  2+ Peripheral Pulses, No clubbing, cyanosis, trace edema

## 2022-11-27 NOTE — PROGRESS NOTE ADULT - ASSESSMENT
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 Patient states that recently she has been feeling very fatigued. She states that moving around her house, or even to the bathroom, has been causing her to get very tired and causes her some minor chest pain, which resolves quickly with rest. She states that her breathing has been non-labored, denies dyspnea, shortness of breath, inability to catch her breath. She states that she has also not had much of an appetite over the last month and has been eating mostly soft foods. She admits to about a 5lb weight loss over the last month. She admits to a minor cough that occasionally occurs in fits and makes her feel as if she may vomit. Patient admits to some nausea and aversion to food but denies mechanical issues, inability to eat or feeling of choking. Denies hematemesis, hemoptysis.   (09 Nov 2022 12:31)      Hyponatremia  resolved  Likely SIADH  free water restriction <1L/day  improved  monitor    Hypophosphatemia  supplement as needed  Check phosphorus daily.     Anemia  Defer to hematology               hypocalcemia  sec to low alb  vit d 1,25 ok  Monitor

## 2022-11-27 NOTE — PROGRESS NOTE ADULT - SUBJECTIVE AND OBJECTIVE BOX
Date of Service: 11-27-22 @ 14:36    Patient is a 81y old  Female who presents with a chief complaint of RVATS, RUL Nodule Biopsy w/ IR marking (27 Nov 2022 14:28)      Any change in ROS: she started wheezing yesterday and she was started on dexa:     MEDICATIONS  (STANDING):  acetaminophen   IVPB .. 750 milliGRAM(s) IV Intermittent once  albuterol/ipratropium for Nebulization 3 milliLiter(s) Nebulizer every 6 hours  citalopram 10 milliGRAM(s) Oral daily  dexAMETHasone  Injectable 6 milliGRAM(s) IV Push daily  enoxaparin Injectable 40 milliGRAM(s) SubCutaneous every 24 hours  fluticasone propionate 50 MICROgram(s)/spray Nasal Spray 1 Spray(s) Both Nostrils two times a day  folic acid 1 milliGRAM(s) Oral daily  guaiFENesin  milliGRAM(s) Oral every 12 hours  levalbuterol 45 MICROgram(s) HFA Inhaler 2 Puff(s) Inhalation every 6 hours  lidocaine   4% Patch 1 Patch Transdermal daily  melatonin 3 milliGRAM(s) Oral at bedtime  metoprolol tartrate 50 milliGRAM(s) Oral two times a day  pantoprazole    Tablet 40 milliGRAM(s) Oral before breakfast  polyethylene glycol 3350 17 Gram(s) Oral daily  senna 1 Tablet(s) Oral daily  sodium bicarbonate 650 milliGRAM(s) Oral three times a day    MEDICATIONS  (PRN):  acetaminophen     Tablet .. 650 milliGRAM(s) Oral every 6 hours PRN Mild Pain (1 - 3)  benzonatate 200 milliGRAM(s) Oral three times a day PRN Cough  naloxone Injectable 0.1 milliGRAM(s) IV Push every 3 minutes PRN For ANY of the following changes in patient status:  A. RR LESS THAN 10 breaths per minute, B. Oxygen saturation LESS THAN 90%, C. Sedation score of 6  ondansetron Injectable 4 milliGRAM(s) IV Push every 6 hours PRN Nausea    Vital Signs Last 24 Hrs  T(C): 36.6 (27 Nov 2022 13:10), Max: 36.6 (27 Nov 2022 13:10)  T(F): 97.8 (27 Nov 2022 13:10), Max: 97.8 (27 Nov 2022 13:10)  HR: 45 (27 Nov 2022 13:10) (45 - 98)  BP: 139/79 (27 Nov 2022 13:10) (139/79 - 158/82)  BP(mean): --  RR: 18 (27 Nov 2022 13:10) (16 - 18)  SpO2: 95% (27 Nov 2022 13:10) (94% - 97%)    Parameters below as of 27 Nov 2022 13:10  Patient On (Oxygen Delivery Method): nasal cannula  O2 Flow (L/min): 2      I&O's Summary        Physical Exam:   GENERAL: NAD, well-groomed, well-developed  HEENT: RANJIT/   Atraumatic, Normocephalic  ENMT: No tonsillar erythema, exudates, or enlargement; Moist mucous membranes, Good dentition, No lesions  NECK: Supple, No JVD, Normal thyroid  CHEST/LUNG: wheezing mild   CVS: Regular rate and rhythm; No murmurs, rubs, or gallops  GI: : Soft, Nontender, Nondistended; Bowel sounds present  NERVOUS SYSTEM:  Alert & Oriented X3  EXTREMITIES:  -edema  LYMPH: No lymphadenopathy noted  SKIN: No rashes or lesions  ENDOCRINOLOGY: No Thyromegaly  PSYCH: Appropriate    Labs:  22, 17                            8.8    5.50  )-----------( 203      ( 27 Nov 2022 06:54 )             28.9                         9.0    8.53  )-----------( 274      ( 26 Nov 2022 06:56 )             28.9                         9.5    8.88  )-----------( 253      ( 25 Nov 2022 06:00 )             30.5                         9.5    8.88  )-----------( 240      ( 24 Nov 2022 02:28 )             29.8     11-27    144  |  107  |  19  ----------------------------<  166<H>  4.2   |  25  |  0.48<L>  11-26    137  |  105  |  14  ----------------------------<  133<H>  4.1   |  21<L>  |  0.47<L>  11-25    138  |  107  |  13  ----------------------------<  102<H>  4.4   |  23  |  0.48<L>  11-24    139  |  109<H>  |  14  ----------------------------<  112<H>  5.1   |  22  |  0.47<L>  11-24    138  |  104  |  15  ----------------------------<  114<H>  3.0<L>   |  22  |  0.45<L>    Ca    8.0<L>      27 Nov 2022 06:54  Ca    7.8<L>      26 Nov 2022 06:56  Phos  3.6     11-27  Phos  3.3     11-26  Mg     2.00     11-27  Mg     1.80     11-26    TPro  5.8<L>  /  Alb  2.5<L>  /  TBili  0.6  /  DBili  x   /  AST  11  /  ALT  <5<L>  /  AlkPhos  55  11-27  TPro  5.9<L>  /  Alb  2.4<L>  /  TBili  0.7  /  DBili  x   /  AST  18  /  ALT  5   /  AlkPhos  64  11-26  TPro  5.6<L>  /  Alb  2.4<L>  /  TBili  0.6  /  DBili  x   /  AST  15  /  ALT  <5  /  AlkPhos  71  11-25  TPro  5.7<L>  /  Alb  2.4<L>  /  TBili  0.5  /  DBili  x   /  AST  14  /  ALT  5   /  AlkPhos  61  11-24    CAPILLARY BLOOD GLUCOSE          LIVER FUNCTIONS - ( 27 Nov 2022 06:54 )  Alb: 2.5 g/dL / Pro: 5.8 g/dL / ALK PHOS: 55 U/L / ALT: <5 U/L / AST: 11 U/L / GGT: x               D-Dimer Assay, Quantitative: 1867 ng/mL DDU (11-27 @ 06:54)  D-Dimer Assay, Quantitative: 2792 ng/mL DDU (11-25 @ 06:00)  D-Dimer Assay, Quantitative: 2286 ng/mL DDU (11-23 @ 06:07)  Procalcitonin, Serum: 0.22 ng/mL (11-25 @ 06:00)    rad< from: Xray Chest 1 View-PORTABLE IMMEDIATE (Xray Chest 1 View-PORTABLE IMMEDIATE .) (11.22.22 @ 16:18) >  INTERPRETATION:  EXAMINATION: XR CHEST IMMEDIATE    CLINICAL INDICATION: cough r/o pna    TECHNIQUE: Single frontal, portable view of the chest was obtained.    COMPARISON: Chest x-ray 11/18/2022.    FINDINGS:  Implantable port overlying the left chest wall with its tip terminating   in the upper right atrium.  Left upper lung field chain sutures are noted.  The heart size cannot be accurately assessed in this projection.  Bilateral interstitial and bibasilar hazy opacities.  There is no pneumothorax.  No acute bony abnormality.    IMPRESSION:  Bilateral interstitial opacities.  Bibasilar hazy opacities, compatible with small bilateral effusions   and/or bibasilar subsegmental atelectasis. Infectious etiology cannot be   ruled out.    --- End of Report ---          KATHY FERGUSON MD; Resident Radiologist  This document has been electronically signed.  ALEXA ZABALA MD; AttendingInterventional Radiologist  This document has been electronically signed. Nov 23 2022 12:47PM    < end of copied text >      RECENT CULTURES:        RESPIRATORY CULTURES:          Studies  Chest X-RAY  CT SCAN Chest   Venous Dopplers: LE:   CT Abdomen  Others

## 2022-11-27 NOTE — PROGRESS NOTE ADULT - ASSESSMENT
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 Patient is admitted to be optimized for her surgical procedure.    Plan: OR tomorrow for RVATS, RUL Nodule Biopsy w/ IR Marking.    1: hx of lymphoma: DBLCL  2: Pleural effusion :  3: HLD:   '4: ANCA ASSOCIATED VASCULITIS      11/17:    1: hx of lymphoma: DBLCL: S/P SURGERY RUL wedge resection and LN biopsy : await results:   2: Pleural effusion : not much of chest xray  : but ct scan chest revelas more pleurl effusion on rt sdie then left:  send for chem and cultures   3: HLD: : per PMD  '4: ANCA ASSOCIATED VASCULITIS : sHE HAD FEVER:  RHEUMATOLOGY FOLLOWING ON ANTIBIOTICS:    DW THORACIC     11/18:    1: hx of lymphoma: DBLCL: S/P SURGERY RUL wedge resection and LN biopsy : await results:   2: Pleural effusion : not much of chest xray  : but ct scan chest reveals more pleural effusion on rt side then left: s/p thoracentesis : sent for cultures:  has exudative effusion ? malignancy ? lymphoma  3: HLD: : per PMD  '4: ANCA ASSOCIATED VASCULITIS : ssHE HAD FEVER:  RHEUMATOLOGY FOLLOWING ON ANTIBIOTICS: AFEBRILE IN LAST 24 HOURS AND IS ON CEFEPIME:     DW THORACIC AND ACP     11/20:      1: hx of lymphoma: DBLCL: S/P SURGERY RUL wedge resection and LN biopsy : await results:   2: Pleural effusion : not much of chest xray  : but ct scan chest reveals more pleural effusion on rt side then left: s/p thoracentesis : sent for cultures:  has exudative effusion ? malignancy ? lymphoma : await flow cyto   3: HLD: : per PMD  '4: ANCA ASSOCIATED VASCULITIS : ssHE HAD FEVER:  RHEUMATOLOGY FOLLOWING ON ANTIBIOTICS: AFEBRILE IN LAST 72 HOURS AND IS ON CEFEPIME:     DW  ACP     11/21:      1: hx of lymphoma: DBLCL: S/P SURGERY RUL wedge resection and LN biopsy : await results:   2: Pleural effusion : not much of chest xray  : but ct scan chest reveals more pleural effusion on rt side then left: s/p thoracentesis : sent for cultures:  has exudative effusion ? malignancy ? lymphoma : await flow cyto   3: HLD: : per PMD  '4: ANCA ASSOCIATED VASCULITIS : SHE HAD FEVER:  RHEUMATOLOGY FOLLOWING ON ANTIBIOTICS: AFEBRILE IN LAST 72 HOURS AND IS still off CEFEPIME: now  5: now with covid : on remdesivir:  : she is on  2 L of oxygen : but on record she did not desaturate too much      DW  ACP     11/22    1: hx of lymphoma: DBLCL: S/P SURGERY RUL wedge resection and LN biopsy : await results:   2: Pleural effusion : not much of chest xray  : but ct scan chest reveals more pleural effusion on rt side then left: s/p thoracentesis : sent for cultures:  has exudative effusion ? malignancy ? lymphoma : await flow cyto   3: HLD: : per PMD  '4: ANCA ASSOCIATED VASCULITIS : SHE HAD FEVER:  RHEUMATOLOGY FOLLOWING ON ANTIBIOTICS: AFEBRILE IN LAST 72 HOURS AND IS  off CEFEPIME: now  5: now with covid : on remdesivir:  : she is on  2 L of oxygen : but on record she did not desaturate too much  : she is on it for comfort:  demi acp : to remove oxygen and see her real o2 sao2: HER D DIMER NEEDS TO BE CHECKED    dw acp    11/23:      1: hx of lymphoma: DBLCL: S/P SURGERY RUL wedge resection and LN biopsy : await results:   2: Pleural effusion : not much of chest xray  : but ct scan chest reveals more pleural effusion on rt side then left: s/p thoracentesis : sent for cultures:  has exudative effusion ? malignancy ? lymphoma : await flow cyto   3: HLD: : per PMD  '4: ANCA ASSOCIATED VASCULITIS : SHE HAD FEVER:  RHEUMATOLOGY FOLLOWING ON ANTIBIOTICS: AFEBRILE IN LAST 72 HOURS AND IS  off CEFEPIME: now  5: now with covid : on remdesivir:  : she is on  2 L of oxygen : but on record she did not desaturate too much  : she is on it for comfort:  demi acp : to remove oxygen and see her real o2 sao2: HER D DIMER is high   ut she already had negative pe:     dw acp    11/24:    1: hx of lymphoma: DBLCL: S/P SURGERY RUL wedge resection and LN biopsy : await results:   2: Pleural effusion : not much of chest xray  : but ct scan chest reveals more pleural effusion on rt side then left: s/p thoracentesis : sent for cultures:  has exudative effusion ? malignancy ? lymphoma :   3: HLD: : per PMD  '4: ANCA ASSOCIATED VASCULITIS : SHE HAD FEVER:  RHEUMATOLOGY FOLLOWING ON ANTIBIOTICS: AFEBRILE IN LAST 72 HOURS AND IS  off CEFEPIME: now  5: now with covid : on remdesivir still   : she is on  2 L of oxygen : but on record she did not desaturate too much  : she is on it for comfort:  dw acp : to remove oxygen and see her real o2 sao2: HER D DIMER is high   but she already had negative pe: not on steroids per ID     dw acp    11/25:    1: hx of lymphoma: DBLCL: S/P SURGERY RUL wedge resection and LN biopsy : previous ebus biopsy was negative:  no granulomas reproted  2: Pleural effusion : not much of chest xray  : but ct scan chest reveals more pleural effusion on rt side then left: s/p thoracentesis : sent for cultures:  has exudative effusion ? malignancy ? lymphoma :   3: HLD: : per PMD  '4: ANCA ASSOCIATED VASCULITIS : SHE HAD FEVER:  RHEUMATOLOGY FOLLOWING ON ANTIBIOTICS: AFEBRILE IN LAST 72 HOURS AND IS  off CEFEPIME: now  5: now with covid : on remdesivir still   : she is on  2 L of oxygen : but on record she did not desaturate too much  : she is on it for comfort:  dw acp : to remove oxygen and see her real o2 sao2: HER D DIMER is high   but she already had negative pe: not on steroids per ID     dw acp    11/27:    1: hx of lymphoma: DBLCL: S/P SURGERY RUL wedge resection and LN biopsy : previous ebus biopsy was negative:  no granulomas reported  2: Pleural effusion : not much of chest xray  : but ct scan chest reveals more pleural effusion on rt side then left: s/p thoracentesis : sent for cultures:  has exudative effusion ? malignancy ? lymphoma :   3: HLD: : per PMD  '4: ANCA ASSOCIATED VASCULITIS : SHE HAD FEVER:  RHEUMATOLOGY FOLLOWING ON ANTIBIOTICS: AFEBRILE IN LAST 72 HOURS AND IS  off CEFEPIME: now  5: now with covid : on remdesivir still   :dexa started on for wheezing +    dw PMD

## 2022-11-27 NOTE — PROGRESS NOTE ADULT - NSPROGADDITIONALINFOA_GEN_ALL_CORE
d/w the son Levon. Plan updated 11/24/22. the son Levon wants Rheum follow up in regards to starting steroids.   no plan for immunosuppresants per rheum. however, okay with steroids. started decadron for covid. see above.     monitor D-dimer. downtrending.     d/w the tran Dos Santos. plan updated again 11/26/22.     - Dr. MARY Arias ([facility])  - [phone]

## 2022-11-27 NOTE — PROGRESS NOTE ADULT - SUBJECTIVE AND OBJECTIVE BOX
[facility]   MD RONEL FRIAS MD RUORU WONG, PA KRISTINE SOLTANPOUR, DO    TEL:  OFFICE: [phone]    RENAL FOLLOW UP NOTE-- Date of Service ;; 11-27-22 @ 14:28  --------------------------------------------------------------------------------  HPI:  Pt seen and examined at bedside  Denies SOB, chest pain.         PAST HISTORY  --------------------------------------------------------------------------------  No significant changes to PMH, PSH, FHx, SHx, unless otherwise noted    ALLERGIES & MEDICATIONS  --------------------------------------------------------------------------------  Allergies    codeine (Nausea)  doxycycline (Nausea)  penicillin (Hives)    Intolerances      Standing Inpatient Medications  acetaminophen   IVPB .. 750 milliGRAM(s) IV Intermittent once  albuterol/ipratropium for Nebulization 3 milliLiter(s) Nebulizer every 6 hours  citalopram 10 milliGRAM(s) Oral daily  dexAMETHasone  Injectable 6 milliGRAM(s) IV Push daily  enoxaparin Injectable 40 milliGRAM(s) SubCutaneous every 24 hours  fluticasone propionate 50 MICROgram(s)/spray Nasal Spray 1 Spray(s) Both Nostrils two times a day  folic acid 1 milliGRAM(s) Oral daily  guaiFENesin  milliGRAM(s) Oral every 12 hours  levalbuterol 45 MICROgram(s) HFA Inhaler 2 Puff(s) Inhalation every 6 hours  lidocaine   4% Patch 1 Patch Transdermal daily  melatonin 3 milliGRAM(s) Oral at bedtime  metoprolol tartrate 50 milliGRAM(s) Oral two times a day  pantoprazole    Tablet 40 milliGRAM(s) Oral before breakfast  polyethylene glycol 3350 17 Gram(s) Oral daily  senna 1 Tablet(s) Oral daily  sodium bicarbonate 650 milliGRAM(s) Oral three times a day    PRN Inpatient Medications  acetaminophen     Tablet .. 650 milliGRAM(s) Oral every 6 hours PRN  benzonatate 200 milliGRAM(s) Oral three times a day PRN  naloxone Injectable 0.1 milliGRAM(s) IV Push every 3 minutes PRN  ondansetron Injectable 4 milliGRAM(s) IV Push every 6 hours PRN      REVIEW OF SYSTEMS  --------------------------------------------------------------------------------  General: no fever  CVS: no chest pain  RESP: no sob, congested  ABD: no abdominal pain  : no dysuria,  MSK: no edema     VITALS/PHYSICAL EXAM  --------------------------------------------------------------------------------  T(C): 36.4 (11-27-22 @ 07:04), Max: 36.4 (11-27-22 @ 07:04)  HR: 98 (11-27-22 @ 07:04) (90 - 98)  BP: 140/78 (11-27-22 @ 07:04) (140/78 - 158/82)  RR: 16 (11-27-22 @ 07:04) (16 - 18)  SpO2: 94% (11-27-22 @ 07:04) (94% - 97%)  Wt(kg): --        Physical Exam:  	Gen: NAD  	HEENT: MMM  	Pulm: Congested, minimal rhonchi L>R  	CV: S1S2  	Abd: Soft, +BS  	Ext: No LE edema B/L                      Neuro: Awake , alert  	Skin: Warm and Dry   	Vascular access: None             LABS/STUDIES  --------------------------------------------------------------------------------              8.8    5.50  >-----------<  203      [11-27-22 @ 06:54]              28.9     144  |  107  |  19  ----------------------------<  166      [11-27-22 @ 06:54]  4.2   |  25  |  0.48        Ca     8.0     [11-27-22 @ 06:54]      Mg     2.00     [11-27-22 @ 06:54]      Phos  3.6     [11-27-22 @ 06:54]    TPro  5.8  /  Alb  2.5  /  TBili  0.6  /  DBili  x   /  AST  11  /  ALT  <5  /  AlkPhos  55  [11-27-22 @ 06:54]          Creatinine Trend:  SCr 0.48 [11-27 @ 06:54]  SCr 0.47 [11-26 @ 06:56]  SCr 0.48 [11-25 @ 06:00]  SCr 0.47 [11-24 @ 08:20]  SCr 0.45 [11-24 @ 02:28]    Urinalysis - [11-17-22 @ 20:04]      Color Judy / Appearance Clear / SG 1.044 / pH 6.5      Gluc Negative / Ketone Small  / Bili Small / Urobili >12 mg/dL       Blood Small / Protein 100 mg/dL / Leuk Est Negative / Nitrite Negative      RBC 12 / WBC 11 / Hyaline 9 / Gran  / Sq Epi  / Non Sq Epi 11 / Bacteria Few      Iron 97, TIBC <127, %sat --      [11-10-22 @ 06:55]  Ferritin 1570      [11-25-22 @ 06:00]  Vitamin D (25OH) 38.3      [11-15-22 @ 06:47]  Lipid: chol 84, TG 81, HDL 22, LDL --      [10-01-22 @ 07:10]    HBsAb Nonreact      [11-11-22 @ 04:15]  HBsAg Nonreact      [11-11-22 @ 04:15]  HBcAb Nonreact      [11-11-22 @ 04:15]  HCV 0.10, Nonreact      [11-15-22 @ 06:47]    ANCA: cANCA Negative, pANCA Negative, atypical ANCA Indeterminate Method interference due to CATHERINE Fluorescence      [11-10-22 @ 06:55]  MPO-ANCA <5.0, interpretation: Negative      [11-10-22 @ 06:55]  PR3-ANCA <5.0, interpretation: Negative      [11-10-22 @ 06:55]

## 2022-11-27 NOTE — PROGRESS NOTE ADULT - ASSESSMENT
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 Patient states that recently she has been feeling very fatigued.    Fatigue/dyspnea, with hx of Lymphoma  - Recent TTE on 11/3/22 reviewed: normal LV. outpt card Dr. Ady Cooper  - PT also saw pulm Mack Tucker in the office, with some concern for her overall status. She was hypotensive to systolic 90s in the office.  (now BP improves s/p IVF here).   - s/p thoracoscopic biopsy of mediastinal lymph node and Lung wedge resection 11/10/22  - path results favoring vasculitis, but final stains to r/o infection are still pending; no evidence for lymphoma  - 11/22/22 a pt felt more SOB early morning hours, placed on 2LNCO2, CXR  some moderate interstitial edema. ordered IV Lasix 20 mg x 1  - comfortable on nasal canula, better post diuretic. wean O2 as tolerates   - appreciate rheum, heme-onc f/u  - no plan for immunosuppressants while being treated for COVID19    Wheezing on 11/26  - comfortable on nasal canula  - started Decadron 6 mg qdaily IV for covid.   - repeat CXR stable.  - d/w rehum and pulm   - Lasix 20 mg x 1     Fever: either 2' ANCA-mediated vasculitis vs infection  - UA, CXR unimpressive. RVP neg.   - no leukocytosis, remain stable clinically  - procal low.  - LE venous dopplers (-) for DVT  - s/p rt thoracentesis 11/17/22  - cefepime started 11/18/22 following worsened leukocytosis 11/18/22   - blood cxs 11/18/22 --> (-)  - cefepime stopped 11/22/22  - ID appreciated  - rheum f/u in regards to steroids     (+) COVID-19 11/21/22  - Remdesivir 11/21/22-->11/23/22  - steroid initiation per rheumatology service and ID.    - monitoring inflammatory markers  - ferritin, CRP downtrending  - d-dimer uptrending, Hep SQ BID switch to Lovenox SQ    - now downtrending    Rt pleural effusion  - CTA chest 11/16/22 revealed moderate rt effusion  - s/p 650 cc U/S-guided rt thoracentesis 11/17/22  - exudative but no neutrophilic predominance and initial Gram stain w/o organisms  - cultures neg.   - ID/ pulmonology consult appreciated  - monitoring off abx     Tachycardia likely 2' fever  - cont low-dose metoprolol  - card consulted (Dr. Hooker) appreciated    Anemia, unspecified  - hgb 8.1 lower than her baseline.  - no melena, no hematochezia. no BRBPR.   - recent Anemia work-up reviewed: normal vit b12, folate.  - repeat iron studies ordered    - s/p 2 units pRBC 11/1/22 per heme's note.   - s/p 1 unit pRBC 11/9/22 with appropriate post transfusion hgb.    - no melena, no hematochezia. no BRBPR.   - 11/21/22 --> s/p another unit of pRBC  - Lasix 20 mg PRN     Anxiety and depression  - stable, continue citalopram.  - Pt denied SI/HI ideations, denied visual and auditory hallucinations.     GERD (gastroesophageal reflux disease)  - continue PPI    Hyperlipidemia.   - continue home ezetimibe. okay to hold if nonformulary   - Lipid panel    DVT ppx   - SC heparin --> Lovenox SQ    Disposition  - PT: rehab. Pt now agreeable to rehab

## 2022-11-28 NOTE — CHART NOTE - NSCHARTNOTEFT_GEN_A_CORE
Pt now off antibiotics and s/p remdesevir, on decadron 6mg qd for 10 days for wheezing/covid treatment. Pt says her shortness of breath improved w/ covid treatment, denies hemoptysis. 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 Once decadron is finished after 10 days, can taper to prednisone 20mg qd. Pt can then follow up with her outpatient Rheumatologist Dr. Kasandra Plaza (within 1-2 weeks of discharge) to discuss further steroid sparing immunosuppressant therapy.    Rheumatology will sign off. Please call/TEAMS if any questions  Discussed with Attending Dr. Deny Pulliam DO  Rheumatology Fellow  Pager: [phone]  Available on TEAMS

## 2022-11-28 NOTE — PROGRESS NOTE ADULT - NSPROGADDITIONALINFOA_GEN_ALL_CORE
d/w the son Levon. Plan updated 11/24/22 and 11/26/22.      monitor D-dimer. LE dopplers recently neg. CTa chest earlier also ne.g    If her wheezing improves, and d-dimer downtrending, can likely dc to rehab on PO steroid regimen as above.     - Dr. MARY Arias ([facility])  - [phone]

## 2022-11-28 NOTE — PROGRESS NOTE ADULT - ASSESSMENT
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 Patient states that recently she has been feeling very fatigued.    Fatigue/dyspnea, with hx of Lymphoma  - Recent TTE on 11/3/22 reviewed: normal LV. outpt card Dr. Ady Cooper  - PT also saw pulm Mack Tucker in the office, with some concern for her overall status. She was hypotensive to systolic 90s in the office.  (now BP improves s/p IVF here).   - s/p thoracoscopic biopsy of mediastinal lymph node and Lung wedge resection 11/10/22  - path results favoring vasculitis, but final stains to r/o infection are still pending; no evidence for lymphoma. Cytology also came back negative.   - 11/22/22 a pt felt more SOB early morning hours, placed on 2LNCO2, CXR  some moderate interstitial edema. Given IV Lasix 20 mg x 1. improved.   - comfortable on nasal canula, better post diuretic. wean O2 as tolerates   - appreciate rheum, heme-onc f/u  - no plan for immunosuppressants such as cellcept while being treated for COVID19    Wheezing on 11/26  - comfortable on nasal canula  - started Decadron 6 mg qdaily IV for covid. After completion of 10 days course, can be switched to Prednisone 20 mg per reheum. then f/u outpt at rheum clinic.   - repeat CXR stable.  - d/w rehum and pulm   - s/p Lasix 20 mg x 1 as above.     Fever: either 2' ANCA-mediated vasculitis vs infection  - UA, CXR unimpressive. RVP neg.   - no leukocytosis, remain stable clinically  - procal low.  - LE venous dopplers (-) for DVT  - s/p rt thoracentesis 11/17/22  - cefepime started 11/18/22 following worsened leukocytosis 11/18/22   - blood cxs 11/18/22 --> (-)  - cefepime stopped 11/22/22  - ID appreciated  - rheum f/u in regards to steroids     (+) COVID-19 11/21/22  - Remdesivir 11/21/22-->11/23/22  - steroid initiation per rheumatology service and ID.    - monitoring inflammatory markers  - ferritin, CRP downtrending  - d-dimer uptrending, Hep SQ BID switch to Lovenox SQ    - now downtrending    Rt pleural effusion  - CTA chest 11/16/22 revealed moderate rt effusion  - s/p 650 cc U/S-guided rt thoracentesis 11/17/22  - exudative but no neutrophilic predominance and initial Gram stain w/o organisms  - cultures neg.   - ID/ pulmonology consult appreciated  - monitoring off abx     Tachycardia likely 2' fever  - cont low-dose metoprolol  - card consulted (Dr. Hooker) appreciated    Anemia, unspecified  - hgb 8.1 lower than her baseline.  - no melena, no hematochezia. no BRBPR.   - recent Anemia work-up reviewed: normal vit b12, folate.  - repeat iron studies ordered    - s/p 2 units pRBC 11/1/22 per heme's note.   - s/p 1 unit pRBC 11/9/22 with appropriate post transfusion hgb.    - no melena, no hematochezia. no BRBPR.   - 11/21/22 --> s/p another unit of pRBC  - Lasix 20 mg PRN     Anxiety and depression  - stable, continue citalopram.  - Pt denied SI/HI ideations, denied visual and auditory hallucinations.     GERD (gastroesophageal reflux disease)  - continue PPI    Hyperlipidemia.   - continue home ezetimibe. okay to hold if nonformulary   - Lipid panel    DVT ppx   - SC heparin --> Lovenox SQ    Disposition  - PT: rehab. Pt now agreeable to rehab

## 2022-11-28 NOTE — PROGRESS NOTE ADULT - SUBJECTIVE AND OBJECTIVE BOX
CARDIOLOGY FOLLOW UP NOTE - DR. CRUZ    Patient Name: BETTIE PRATER  Date of Service: 11-28-22 @ 19:07    events noted  subjective:    cv: denies chest pain, dyspnea, palpitations, dizziness  pulmonary: denies cough  GI: denies abdominal pain, nausea, vomiting  vascular/legs: no edema   skin: no rash  ROS: otherwise negative   overnight events:      PHYSICAL EXAM:  T(C): 36.3 (11-28-22 @ 18:00), Max: 36.6 (11-28-22 @ 05:42)  HR: 74 (11-28-22 @ 18:00) (64 - 96)  BP: 129/49 (11-28-22 @ 18:00) (128/50 - 143/65)  RR: 17 (11-28-22 @ 18:00) (16 - 17)  SpO2: 98% (11-28-22 @ 18:00) (96% - 99%)  Wt(kg): --  I&O's Summary    Daily     Daily     Appearance: Normal	  Cardiovascular: Normal S1 S2,RRR, No JVD, No murmurs  Respiratory: Lungs clear to auscultation	  Gastrointestinal:  Soft, Non-tender, + BS	  Extremities: Normal range of motion, No clubbing, cyanosis or edema      Home Medications:  citalopram 10 mg oral tablet: 1 tab(s) orally once a day (04 Nov 2022 15:16)  ezetimibe 10 mg oral tablet: 1 tab(s) orally once a day (04 Nov 2022 15:16)  folic acid 1 mg oral tablet: 1 tab(s) orally once a day (04 Nov 2022 15:16)  omeprazole 20 mg oral delayed release capsule: 1 cap(s) orally prn (04 Nov 2022 15:16)  Sodium Chloride 1 g oral tablet: daily (04 Nov 2022 15:16)      MEDICATIONS  (STANDING):  acetaminophen   IVPB .. 750 milliGRAM(s) IV Intermittent once  albuterol    90 MICROgram(s) HFA Inhaler 2 Puff(s) Inhalation every 6 hours  citalopram 10 milliGRAM(s) Oral daily  dexAMETHasone  Injectable 6 milliGRAM(s) IV Push daily  enoxaparin Injectable 40 milliGRAM(s) SubCutaneous every 24 hours  fluticasone propionate 50 MICROgram(s)/spray Nasal Spray 1 Spray(s) Both Nostrils two times a day  folic acid 1 milliGRAM(s) Oral daily  guaiFENesin  milliGRAM(s) Oral every 12 hours  ipratropium 17 MICROgram(s) HFA Inhaler 2 Puff(s) Inhalation every 6 hours  levalbuterol 45 MICROgram(s) HFA Inhaler 2 Puff(s) Inhalation every 6 hours  lidocaine   4% Patch 1 Patch Transdermal daily  melatonin 3 milliGRAM(s) Oral at bedtime  metoprolol tartrate 50 milliGRAM(s) Oral two times a day  pantoprazole    Tablet 40 milliGRAM(s) Oral before breakfast  polyethylene glycol 3350 17 Gram(s) Oral daily  senna 1 Tablet(s) Oral daily  sodium bicarbonate 650 milliGRAM(s) Oral three times a day      TELEMETRY: 	    ECG:  	  RADIOLOGY:   DIAGNOSTIC TESTING:  [ ] Echocardiogram:  [ ] Catheterization:  [ ] Stress Test:    OTHER: 	    LABS:	 	    CARDIAC MARKERS:                                      8.8    9.93  )-----------( 288      ( 28 Nov 2022 06:09 )             28.9     11-28    140  |  105  |  23  ----------------------------<  137<H>  4.0   |  24  |  0.52    Ca    8.4      28 Nov 2022 06:09  Phos  2.8     11-28  Mg     2.10     11-28    TPro  6.0  /  Alb  2.6<L>  /  TBili  0.5  /  DBili  x   /  AST  8   /  ALT  <5  /  AlkPhos  57  11-28    proBNP:     Lipid Profile:   HgA1c:     Creatinine, Serum: 0.52 mg/dL (11-28-22 @ 06:09)  Creatinine, Serum: 0.48 mg/dL (11-27-22 @ 06:54)  Creatinine, Serum: 0.47 mg/dL (11-26-22 @ 06:56)

## 2022-11-28 NOTE — PROGRESS NOTE ADULT - SUBJECTIVE AND OBJECTIVE BOX
Date of Service: 11-28-22 @ 14:40    Patient is a 81y old  Female who presents with a chief complaint of RVATS, RUL Nodule Biopsy w/ IR marking (28 Nov 2022 11:41)      Any change in ROS: She feels much better:  no sob:      MEDICATIONS  (STANDING):  acetaminophen   IVPB .. 750 milliGRAM(s) IV Intermittent once  albuterol    90 MICROgram(s) HFA Inhaler 2 Puff(s) Inhalation every 6 hours  citalopram 10 milliGRAM(s) Oral daily  dexAMETHasone  Injectable 6 milliGRAM(s) IV Push daily  enoxaparin Injectable 40 milliGRAM(s) SubCutaneous every 24 hours  fluticasone propionate 50 MICROgram(s)/spray Nasal Spray 1 Spray(s) Both Nostrils two times a day  folic acid 1 milliGRAM(s) Oral daily  guaiFENesin  milliGRAM(s) Oral every 12 hours  ipratropium 17 MICROgram(s) HFA Inhaler 2 Puff(s) Inhalation every 6 hours  levalbuterol 45 MICROgram(s) HFA Inhaler 2 Puff(s) Inhalation every 6 hours  lidocaine   4% Patch 1 Patch Transdermal daily  melatonin 3 milliGRAM(s) Oral at bedtime  metoprolol tartrate 50 milliGRAM(s) Oral two times a day  pantoprazole    Tablet 40 milliGRAM(s) Oral before breakfast  polyethylene glycol 3350 17 Gram(s) Oral daily  senna 1 Tablet(s) Oral daily  sodium bicarbonate 650 milliGRAM(s) Oral three times a day    MEDICATIONS  (PRN):  acetaminophen     Tablet .. 650 milliGRAM(s) Oral every 6 hours PRN Mild Pain (1 - 3)  benzonatate 200 milliGRAM(s) Oral three times a day PRN Cough  naloxone Injectable 0.1 milliGRAM(s) IV Push every 3 minutes PRN For ANY of the following changes in patient status:  A. RR LESS THAN 10 breaths per minute, B. Oxygen saturation LESS THAN 90%, C. Sedation score of 6  ondansetron Injectable 4 milliGRAM(s) IV Push every 6 hours PRN Nausea    Vital Signs Last 24 Hrs  T(C): 36.4 (28 Nov 2022 10:40), Max: 36.7 (27 Nov 2022 18:13)  T(F): 97.6 (28 Nov 2022 10:40), Max: 98.1 (27 Nov 2022 18:13)  HR: 64 (28 Nov 2022 10:40) (64 - 97)  BP: 141/57 (28 Nov 2022 10:40) (123/71 - 143/65)  BP(mean): --  RR: 17 (28 Nov 2022 10:40) (16 - 19)  SpO2: 99% (28 Nov 2022 10:40) (96% - 99%)    Parameters below as of 28 Nov 2022 10:40  Patient On (Oxygen Delivery Method): nasal cannula  O2 Flow (L/min): 2      I&O's Summary        Physical Exam:   GENERAL: NAD, well-groomed, well-developed  HEENT: RANJIT/   Atraumatic, Normocephalic  ENMT: No tonsillar erythema, exudates, or enlargement; Moist mucous membranes, Good dentition, No lesions  NECK: Supple, No JVD, Normal thyroid  CHEST/LUNG: Clear to auscultaion,-o wheezing today   CVS: Regular rate and rhythm; No murmurs, rubs, or gallops  GI: : Soft, Nontender, Nondistended; Bowel sounds present  NERVOUS SYSTEM:  Alert & Oriented X3  EXTREMITIES: -r edema  LYMPH: No lymphadenopathy noted  SKIN: No rashes or lesions  ENDOCRINOLOGY: No Thyromegaly  PSYCH: Appropriate    Labs:  22, 17                            8.8    9.93  )-----------( 288      ( 28 Nov 2022 06:09 )             28.9                         8.8    5.50  )-----------( 203      ( 27 Nov 2022 06:54 )             28.9                         9.0    8.53  )-----------( 274      ( 26 Nov 2022 06:56 )             28.9                         9.5    8.88  )-----------( 253      ( 25 Nov 2022 06:00 )             30.5     11-28    140  |  105  |  23  ----------------------------<  137<H>  4.0   |  24  |  0.52  11-27    144  |  107  |  19  ----------------------------<  166<H>  4.2   |  25  |  0.48<L>  11-26    137  |  105  |  14  ----------------------------<  133<H>  4.1   |  21<L>  |  0.47<L>  11-25    138  |  107  |  13  ----------------------------<  102<H>  4.4   |  23  |  0.48<L>    Ca    8.4      28 Nov 2022 06:09  Ca    8.0<L>      27 Nov 2022 06:54  Phos  2.8     11-28  Phos  3.6     11-27  Mg     2.10     11-28  Mg     2.00     11-27    TPro  6.0  /  Alb  2.6<L>  /  TBili  0.5  /  DBili  x   /  AST  8   /  ALT  <5  /  AlkPhos  57  11-28  TPro  5.8<L>  /  Alb  2.5<L>  /  TBili  0.6  /  DBili  x   /  AST  11  /  ALT  <5<L>  /  AlkPhos  55  11-27  TPro  5.9<L>  /  Alb  2.4<L>  /  TBili  0.7  /  DBili  x   /  AST  18  /  ALT  5   /  AlkPhos  64  11-26  TPro  5.6<L>  /  Alb  2.4<L>  /  TBili  0.6  /  DBili  x   /  AST  15  /  ALT  <5  /  AlkPhos  71  11-25    CAPILLARY BLOOD GLUCOSE          LIVER FUNCTIONS - ( 28 Nov 2022 06:09 )  Alb: 2.6 g/dL / Pro: 6.0 g/dL / ALK PHOS: 57 U/L / ALT: <5 U/L / AST: 8 U/L / GGT: x               D-Dimer Assay, Quantitative: 2028 ng/mL DDU (11-28 @ 06:09)  D-Dimer Assay, Quantitative: 1867 ng/mL DDU (11-27 @ 06:54)  D-Dimer Assay, Quantitative: 2792 ng/mL DDU (11-25 @ 06:00)  D-Dimer Assay, Quantitative: 2286 ng/mL DDU (11-23 @ 06:07)  Procalcitonin, Serum: 0.12 ng/mL (11-28 @ 06:09)  Procalcitonin, Serum: 0.22 ng/mL (11-25 @ 06:00)        RECENT CULTURES:  < from: Xray Chest 1 View- PORTABLE-Routine (Xray Chest 1 View- PORTABLE-Routine .) (11.26.22 @ 18:48) >  HISTORY: Covid-19    COMPARISON STUDY: 11/22/2022    Frontal expiratory view of the chest shows the heart to be similar in   size. Left chest port is again noted.    The lungs show similar patchy infiltrates with small pleural effusions   and there is no evidence of pneumothorax.    IMPRESSION:  Similar patchy infiltrates.        Thank you for the courtesy of this referral.    --- End of Report ---            ALEXA ZABALA MD; Attending Interventional Radiologist  This document has been electronically signed. Nov 27 2022  3:59PM    < end of copied text >  < from: CT Angio Chest PE Protocol w/ IV Cont (11.16.22 @ 20:31) >  mediastinal lymph nodes with the largest in the precarinal location   measuring about 1.6 cm, mildly enlarged, are either unchanged or   demonstrate slight interval increase in size since October 21, 2022.   Large hiatal hernia.    HEART/VASCULATURE: The heart is normal in size. Trace pericardial fluid..   Mitral annular and coronary artery calcifications. Atherosclerotic   changes of the aorta.    AIRWAYS/LUNGS/PLEURA: Status post right upper lobe recent wedge resection   with nodular and linear opacities at the suture line, likely   postsurgical. Status post left upper lobe wedge resection with unchanged   linear opacity in the periphery of the left upper lobe. There are mild   bilateral lower lung groundglass opacities with interlobular septal   thickening. Unchanged right lower lobe bulla and cysts. There are   moderate right and small left pleural effusions with associated adjacent   passive atelectasis. A small right pneumothorax is present.    UPPER ABDOMEN: Within normal limits.    BONES/SOFT TISSUES: Left subcutaneous chest wall port with distal tip   terminating in the right atrium. Degenerative changes of the spine.   Unchanged compression deformities of T11 and L1 vertebral bodies.    IMPRESSION:    No pulmonary embolism.    Status post right upper lobe recent wedge resection with associated   postsurgical change. Right upper paratracheal 1.9 right 2.6 cm   hypodensity as described above may be postoperative in etiology. 3 month   follow-up noncontrast chest CT can be performed for further evaluation.    Moderate right and small left pleural effusions with some interlobular   septal thickening suggestive of pulmonary edema.    Small right pneumothorax.    Dr. Hernandez discussed these findings with MARGRET Marques on 11/16/2022 11:26   PM with read back.    --- End of Report ---    < end of copied text >  < from: CT Angio Chest PE Protocol w/ IV Cont (11.16.22 @ 20:31) >  mediastinal lymph nodes with the largest in the precarinal location   measuring about 1.6 cm, mildly enlarged, are either unchanged or   demonstrate slight interval increase in size since October 21, 2022.   Large hiatal hernia.    HEART/VASCULATURE: The heart is normal in size. Trace pericardial fluid..   Mitral annular and coronary artery calcifications. Atherosclerotic   changes of the aorta.    AIRWAYS/LUNGS/PLEURA: Status post right upper lobe recent wedge resection   with nodular and linear opacities at the suture line, likely   postsurgical. Status post left upper lobe wedge resection with unchanged   linear opacity in the periphery of the left upper lobe. There are mild   bilateral lower lung groundglass opacities with interlobular septal   thickening. Unchanged right lower lobe bulla and cysts. There are   moderate right and small left pleural effusions with associated adjacent   passive atelectasis. A small right pneumothorax is present.    UPPER ABDOMEN: Within normal limits.    BONES/SOFT TISSUES: Left subcutaneous chest wall port with distal tip   terminating in the right atrium. Degenerative changes of the spine.   Unchanged compression deformities of T11 and L1 vertebral bodies.    IMPRESSION:    No pulmonary embolism.    Status post right upper lobe recent wedge resection with associated   postsurgical change. Right upper paratracheal 1.9 right 2.6 cm   hypodensity as described above may be postoperative in etiology. 3 month   follow-up noncontrast chest CT can be performed for further evaluation.    Moderate right and small left pleural effusions with some interlobular   septal thickening suggestive of pulmonary edema.    Small right pneumothorax.    Dr. Hernandez discussed these findings with MARGRET Marques on 11/16/2022 11:26   PM with read back.    --- End of Report ---    < end of copied text >  < from: Xray Chest 1 View- PORTABLE-Routine (Xray Chest 1 View- PORTABLE-Routine .) (11.26.22 @ 18:48) >    ACC: [id number] EXAM:  XR CHEST PORTABLE ROUTINE 1V                          PROCEDURE DATE:  11/26/2022          INTERPRETATION:  Chest one view    HISTORY: Covid-19    COMPARISON STUDY: 11/22/2022    Frontal expiratory view of the chest shows the heart to be similar in   size. Left chest port is again noted.    The lungs show similar patchy infiltrates with small pleural effusions   and there is no evidence of pneumothorax.    IMPRESSION:  Similar patchy infiltrates.        Thank you for the courtesy of this referral.    --- End of Report ---            ALEXA ZABALA MD; Attending Interventional Radiologist  This document has been electronically signed. Nov 27 2022  3:59PM    < end of copied text >        RESPIRATORY CULTURES:          Studies  Chest X-RAY  CT SCAN Chest   Venous Dopplers: LE:   CT Abdomen  Others

## 2022-11-28 NOTE — PROGRESS NOTE ADULT - ASSESSMENT
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), s/p LVATS, LUIS wedge resection (2021) who presented as for RVATS, RUL Nodule Biopsy    COVID  s/p remdesivir x 5 day course, completed 11/25  trend inflammatory markers  monitor SpO2, supplemental O2 as needed, wean as tolerated  c/w decadron  supportive care  isolation per infection control policy     vasculitis  pathology concerning for vasculitis, special stains negative for microorganisms  no objection from ID standpoint for steroids for vasculitis  rheum f/u    fever- resolved  febrile to 101.5 on 11/14, afebrile since  no evidence of SSTI  quant gold negative  US LE neg for DVT  procal <.5  CTA chest- Moderate right and small left pleural effusions with some interlobular septal thickening suggestive of pulmonary edema. Small right pneumothorax.  s/p thoracentesis 11/17, exudative effusion, cultures- no growth  blood cultures- no growth final  pathology special stains negative for microorganisms  all cultures w/o growth, cefepime discontinued 11/22    DLBCL  s/p R VATS with RUL wedge resection and mediastinal LN dissection on 11/10/22  pathology- heterogenous population of lymphocytes; not consistent w/ recurrence  hem/onc following    penicillin allergy  reports reaction- welts as a child  tolerated ceftriaxone last month, tolerated cefepime this admission    Gio Tate M.D.  [facility], Division of Infectious Diseases  [phone]  After 5pm on weekdays and all day on weekends - please call [phone]

## 2022-11-28 NOTE — CHART NOTE - NSCHARTNOTEFT_GEN_A_CORE
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 Patient is admitted to be optimized for her surgical procedure.      Nutrition Course: Nutrition interview: No recent episodes of nausea, vomiting, diarrhea or constipation, BM noted on 11/27 per RN flowsheets. Denies any chewing/swallowing difficulties. Food preferences explored and noted. Intake is 0-25% per RN flowsheets and per pt, and as observed today upon visit.  Feeding skills: set up help. Pt does not like the Ensure plus supplements, requesting to change supplement to ensure clear- will recommend Ensure Clear 3x daily (540 jose luis and 24 gm protein) to promote optimal PO intake.     Diet Prescription: Diet, Regular:   1500mL Fluid Restriction ([id number])  Supplement Feeding Modality:  Oral  Ensure Enlive Cans or Servings Per Day:  1       Frequency:  Two Times a day (11-20-22 @ 18:01)    Pertinent Medications: MEDICATIONS  (STANDING):  acetaminophen   IVPB .. 750 milliGRAM(s) IV Intermittent once  albuterol    90 MICROgram(s) HFA Inhaler 2 Puff(s) Inhalation every 6 hours  citalopram 10 milliGRAM(s) Oral daily  dexAMETHasone  Injectable 6 milliGRAM(s) IV Push daily  enoxaparin Injectable 40 milliGRAM(s) SubCutaneous every 24 hours  fluticasone propionate 50 MICROgram(s)/spray Nasal Spray 1 Spray(s) Both Nostrils two times a day  folic acid 1 milliGRAM(s) Oral daily  guaiFENesin  milliGRAM(s) Oral every 12 hours  ipratropium 17 MICROgram(s) HFA Inhaler 2 Puff(s) Inhalation every 6 hours  levalbuterol 45 MICROgram(s) HFA Inhaler 2 Puff(s) Inhalation every 6 hours  lidocaine   4% Patch 1 Patch Transdermal daily  melatonin 3 milliGRAM(s) Oral at bedtime  metoprolol tartrate 50 milliGRAM(s) Oral two times a day  pantoprazole    Tablet 40 milliGRAM(s) Oral before breakfast  polyethylene glycol 3350 17 Gram(s) Oral daily  senna 1 Tablet(s) Oral daily  sodium bicarbonate 650 milliGRAM(s) Oral three times a day    MEDICATIONS  (PRN):  acetaminophen     Tablet .. 650 milliGRAM(s) Oral every 6 hours PRN Mild Pain (1 - 3)  benzonatate 200 milliGRAM(s) Oral three times a day PRN Cough  naloxone Injectable 0.1 milliGRAM(s) IV Push every 3 minutes PRN For ANY of the following changes in patient status:  A. RR LESS THAN 10 breaths per minute, B. Oxygen saturation LESS THAN 90%, C. Sedation score of 6  ondansetron Injectable 4 milliGRAM(s) IV Push every 6 hours PRN Nausea    Pertinent Labs: 11-28 Na140 mmol/L Glu 137 mg/dL<H> K+ 4.0 mmol/L Cr  0.52 mg/dL BUN 23 mg/dL 11-28 Phos 2.8 mg/dL 11-28 Alb 2.6 g/dL<L>      Weight: Height (cm): 147.3 (11-10 @ 02:06), 154.9 (10-04 @ 11:51)  Weight (kg): 54.9 (11-18), 58.1 (11-10 @ 02:06), 62.6 (10-04 @ 12:30)  BMI (kg/m2): 26.8 (11-10 @ 02:06), 26.1 (10-04 @ 12:30)  Weight Assessment: Pt with significant wt loss x1 week (-5.4%).       Physical Assessment, per flowsheets:  Edema: none noted  Skin: intact    Estimated Needs:   [X] No change since previous assessment    Previous Nutrition Diagnosis: [x ] Malnutrition   Nutrition Diagnosis is [x ] ongoing  [ ] resolved [ ] not applicable   New Nutrition Diagnosis: [x ] not applicable     Interventions:   1) Continue on current diet rx: Regular, 1500ml fluid restriction per MD discretion   2) d/c ensure enlive, recommend Ensure Clear 3x daily (540 jose luis and 24 gm protein).   3) Encourage PO intake and honor food preferences as able.  4) Obtain weekly wts  5) RD to f/u prn     Monitor & Evaluate:  PO intake, tolerance to diet/supplement, nutrition related lab values, weight trends, BMs/GI distress, hydration status, skin integrity.    Erlinda Amezquita MS, RDN (Pager #[phone]) | Also available on TEAMS

## 2022-11-28 NOTE — PROGRESS NOTE ADULT - SUBJECTIVE AND OBJECTIVE BOX
SUBJECTIVE/ OVERNIGHT EVENTS:  wheezing improving on decadron  no cp, no sob, no n/v/d. no abdominal pain.  no headache, no dizziness.   she wants to go home.       --------------------------------------------------------------------------------------------  LABS:                        8.8    9.93  )-----------( 288      ( 28 Nov 2022 06:09 )             28.9     11-28    140  |  105  |  23  ----------------------------<  137<H>  4.0   |  24  |  0.52    Ca    8.4      28 Nov 2022 06:09  Phos  2.8     11-28  Mg     2.10     11-28    TPro  6.0  /  Alb  2.6<L>  /  TBili  0.5  /  DBili  x   /  AST  8   /  ALT  <5  /  AlkPhos  57  11-28      CAPILLARY BLOOD GLUCOSE                RADIOLOGY & ADDITIONAL TESTS:    Imaging Personally Reviewed:  [x] YES  [ ] NO    Consultant(s) Notes Reviewed:  [x] YES  [ ] NO    MEDICATIONS  (STANDING):  acetaminophen   IVPB .. 750 milliGRAM(s) IV Intermittent once  albuterol    90 MICROgram(s) HFA Inhaler 2 Puff(s) Inhalation every 6 hours  citalopram 10 milliGRAM(s) Oral daily  dexAMETHasone  Injectable 6 milliGRAM(s) IV Push daily  enoxaparin Injectable 40 milliGRAM(s) SubCutaneous every 24 hours  fluticasone propionate 50 MICROgram(s)/spray Nasal Spray 1 Spray(s) Both Nostrils two times a day  folic acid 1 milliGRAM(s) Oral daily  guaiFENesin  milliGRAM(s) Oral every 12 hours  ipratropium 17 MICROgram(s) HFA Inhaler 2 Puff(s) Inhalation every 6 hours  levalbuterol 45 MICROgram(s) HFA Inhaler 2 Puff(s) Inhalation every 6 hours  lidocaine   4% Patch 1 Patch Transdermal daily  melatonin 3 milliGRAM(s) Oral at bedtime  metoprolol tartrate 50 milliGRAM(s) Oral two times a day  pantoprazole    Tablet 40 milliGRAM(s) Oral before breakfast  polyethylene glycol 3350 17 Gram(s) Oral daily  senna 1 Tablet(s) Oral daily  sodium bicarbonate 650 milliGRAM(s) Oral three times a day    MEDICATIONS  (PRN):  acetaminophen     Tablet .. 650 milliGRAM(s) Oral every 6 hours PRN Mild Pain (1 - 3)  benzonatate 200 milliGRAM(s) Oral three times a day PRN Cough  naloxone Injectable 0.1 milliGRAM(s) IV Push every 3 minutes PRN For ANY of the following changes in patient status:  A. RR LESS THAN 10 breaths per minute, B. Oxygen saturation LESS THAN 90%, C. Sedation score of 6  ondansetron Injectable 4 milliGRAM(s) IV Push every 6 hours PRN Nausea      Care Discussed with Consultants/Other Providers [x] YES  [ ] NO    Vital Signs Last 24 Hrs  T(C): 36.3 (28 Nov 2022 18:00), Max: 36.6 (28 Nov 2022 05:42)  T(F): 97.4 (28 Nov 2022 18:00), Max: 97.8 (28 Nov 2022 05:42)  HR: 74 (28 Nov 2022 18:00) (64 - 96)  BP: 129/49 (28 Nov 2022 18:00) (128/50 - 143/65)  BP(mean): --  RR: 17 (28 Nov 2022 18:00) (16 - 17)  SpO2: 98% (28 Nov 2022 18:00) (96% - 99%)    Parameters below as of 28 Nov 2022 18:00  Patient On (Oxygen Delivery Method): nasal cannula  O2 Flow (L/min): 2    I&O's Summary          PHYSICAL EXAM:  GENERAL: NAD, thin-elderly, comfortable on nasal canula.   HEAD:  Atraumatic, Normocephalic  EYES: EOMI, PERRLA, conjunctiva and sclera clear  NECK: Supple, No JVD  CHEST/LUNG: mild decrease breath sounds bilaterally; + wheeze much improved.   HEART: Regular rate and rhythm; No murmurs, rubs, or gallops  ABDOMEN: Soft, Nontender, Nondistended; Bowel sounds present  Neuro: AAOx3, no focal weakness   EXTREMITIES:  2+ Peripheral Pulses, No clubbing, cyanosis, trace edema

## 2022-11-28 NOTE — PROGRESS NOTE ADULT - SUBJECTIVE AND OBJECTIVE BOX
[facility], Division of Infectious Diseases  ANDREW Rodríguez Y. Patel, S. Shah, G. Bebeto  [phone]  ([phone] - weekdays after 5pm and weekends)    Name: BETTIE PRATER  Age/Gender: 81y Female  MRN: [id number]    Interval History:  Patient seen this morning.   Notes reviewed.   No concerning overnight events.  Afebrile.   states she feels better since starting steroids  denies any complaints today    Allergies: codeine (Nausea)  doxycycline (Nausea)  penicillin (Hives)      Objective:  Vitals:   T(F): 97.5 (11-28-22 @ 07:30), Max: 98.1 (11-27-22 @ 18:13)  HR: 92 (11-28-22 @ 07:30) (45 - 97)  BP: 142/54 (11-28-22 @ 07:30) (123/71 - 143/65)  RR: 17 (11-28-22 @ 07:30) (16 - 19)  SpO2: 99% (11-28-22 @ 07:30) (95% - 99%)  Physical Examination:  General: no acute distress  HEENT: anicteric, NC  Cardio: normal rate  Resp: mild wheezes R lung field, L lung clear to ausculation  Abd: soft, NT, ND  Ext: no LE edema  Skin: warm, dry    Laboratory Studies:  CBC:                       8.8    9.93  )-----------( 288      ( 28 Nov 2022 06:09 )             28.9     WBC Trend:  9.93 11-28-22 @ 06:09  5.50 11-27-22 @ 06:54  8.53 11-26-22 @ 06:56  8.88 11-25-22 @ 06:00  8.88 11-24-22 @ 02:28  11.71 11-23-22 @ 06:07  22.07 11-22-22 @ 07:48  16.19 11-21-22 @ 17:12    CMP: 11-28    140  |  105  |  23  ----------------------------<  137<H>  4.0   |  24  |  0.52    Ca    8.4      28 Nov 2022 06:09  Phos  2.8     11-28  Mg     2.10     11-28    TPro  6.0  /  Alb  2.6<L>  /  TBili  0.5  /  DBili  x   /  AST  8   /  ALT  <5  /  AlkPhos  57  11-28      LIVER FUNCTIONS - ( 28 Nov 2022 06:09 )  Alb: 2.6 g/dL / Pro: 6.0 g/dL / ALK PHOS: 57 U/L / ALT: <5 U/L / AST: 8 U/L / GGT: x               Microbiology: reviewed     Culture - Blood (collected 11-18-22 @ 09:45)  Source: .Blood Blood-Peripheral  Final Report (11-23-22 @ 13:01):    No Growth Final    Culture - Blood (collected 11-18-22 @ 06:00)  Source: .Blood Blood-Peripheral  Final Report (11-23-22 @ 13:01):    No Growth Final    Culture - Body Fluid with Gram Stain (collected 11-17-22 @ 16:04)  Source: Pleural Fl Pleural Fluid  Gram Stain (11-17-22 @ 22:06):    polymorphonuclear leukocytes seen    No organisms seen    by cytocentrifuge  Final Report (11-22-22 @ 18:19):    No growth at 5 days    Culture - Blood (collected 11-14-22 @ 23:17)  Source: .Blood Blood-Peripheral  Final Report (11-20-22 @ 07:00):    No Growth Final    Culture - Blood (collected 11-14-22 @ 21:40)  Source: .Blood Blood-Peripheral  Final Report (11-20-22 @ 04:00):    No Growth Final        Radiology: reviewed     Medications:  acetaminophen     Tablet .. 650 milliGRAM(s) Oral every 6 hours PRN  acetaminophen   IVPB .. 750 milliGRAM(s) IV Intermittent once  albuterol/ipratropium for Nebulization 3 milliLiter(s) Nebulizer every 6 hours  benzonatate 200 milliGRAM(s) Oral three times a day PRN  citalopram 10 milliGRAM(s) Oral daily  dexAMETHasone  Injectable 6 milliGRAM(s) IV Push daily  enoxaparin Injectable 40 milliGRAM(s) SubCutaneous every 24 hours  fluticasone propionate 50 MICROgram(s)/spray Nasal Spray 1 Spray(s) Both Nostrils two times a day  folic acid 1 milliGRAM(s) Oral daily  guaiFENesin  milliGRAM(s) Oral every 12 hours  levalbuterol 45 MICROgram(s) HFA Inhaler 2 Puff(s) Inhalation every 6 hours  lidocaine   4% Patch 1 Patch Transdermal daily  melatonin 3 milliGRAM(s) Oral at bedtime  metoprolol tartrate 50 milliGRAM(s) Oral two times a day  naloxone Injectable 0.1 milliGRAM(s) IV Push every 3 minutes PRN  ondansetron Injectable 4 milliGRAM(s) IV Push every 6 hours PRN  pantoprazole    Tablet 40 milliGRAM(s) Oral before breakfast  polyethylene glycol 3350 17 Gram(s) Oral daily  senna 1 Tablet(s) Oral daily  sodium bicarbonate 650 milliGRAM(s) Oral three times a day    Antimicrobials:

## 2022-11-28 NOTE — PROGRESS NOTE ADULT - ASSESSMENT
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 Patient is admitted to be optimized for her surgical procedure.    Plan: OR tomorrow for RVATS, RUL Nodule Biopsy w/ IR Marking.    1: hx of lymphoma: DBLCL  2: Pleural effusion :  3: HLD:   '4: ANCA ASSOCIATED VASCULITIS      11/17:    1: hx of lymphoma: DBLCL: S/P SURGERY RUL wedge resection and LN biopsy : await results:   2: Pleural effusion : not much of chest xray  : but ct scan chest revelas more pleurl effusion on rt sdie then left:  send for chem and cultures   3: HLD: : per PMD  '4: ANCA ASSOCIATED VASCULITIS : sHE HAD FEVER:  RHEUMATOLOGY FOLLOWING ON ANTIBIOTICS:    DW THORACIC     11/18:    1: hx of lymphoma: DBLCL: S/P SURGERY RUL wedge resection and LN biopsy : await results:   2: Pleural effusion : not much of chest xray  : but ct scan chest reveals more pleural effusion on rt side then left: s/p thoracentesis : sent for cultures:  has exudative effusion ? malignancy ? lymphoma  3: HLD: : per PMD  '4: ANCA ASSOCIATED VASCULITIS : ssHE HAD FEVER:  RHEUMATOLOGY FOLLOWING ON ANTIBIOTICS: AFEBRILE IN LAST 24 HOURS AND IS ON CEFEPIME:     DW THORACIC AND ACP     11/20:      1: hx of lymphoma: DBLCL: S/P SURGERY RUL wedge resection and LN biopsy : await results:   2: Pleural effusion : not much of chest xray  : but ct scan chest reveals more pleural effusion on rt side then left: s/p thoracentesis : sent for cultures:  has exudative effusion ? malignancy ? lymphoma : await flow cyto   3: HLD: : per PMD  '4: ANCA ASSOCIATED VASCULITIS : ssHE HAD FEVER:  RHEUMATOLOGY FOLLOWING ON ANTIBIOTICS: AFEBRILE IN LAST 72 HOURS AND IS ON CEFEPIME:     DW  ACP     11/21:      1: hx of lymphoma: DBLCL: S/P SURGERY RUL wedge resection and LN biopsy : await results:   2: Pleural effusion : not much of chest xray  : but ct scan chest reveals more pleural effusion on rt side then left: s/p thoracentesis : sent for cultures:  has exudative effusion ? malignancy ? lymphoma : await flow cyto   3: HLD: : per PMD  '4: ANCA ASSOCIATED VASCULITIS : SHE HAD FEVER:  RHEUMATOLOGY FOLLOWING ON ANTIBIOTICS: AFEBRILE IN LAST 72 HOURS AND IS still off CEFEPIME: now  5: now with covid : on remdesivir:  : she is on  2 L of oxygen : but on record she did not desaturate too much      DW  ACP     11/22    1: hx of lymphoma: DBLCL: S/P SURGERY RUL wedge resection and LN biopsy : await results:   2: Pleural effusion : not much of chest xray  : but ct scan chest reveals more pleural effusion on rt side then left: s/p thoracentesis : sent for cultures:  has exudative effusion ? malignancy ? lymphoma : await flow cyto   3: HLD: : per PMD  '4: ANCA ASSOCIATED VASCULITIS : SHE HAD FEVER:  RHEUMATOLOGY FOLLOWING ON ANTIBIOTICS: AFEBRILE IN LAST 72 HOURS AND IS  off CEFEPIME: now  5: now with covid : on remdesivir:  : she is on  2 L of oxygen : but on record she did not desaturate too much  : she is on it for comfort:  demi acp : to remove oxygen and see her real o2 sao2: HER D DIMER NEEDS TO BE CHECKED    dw acp    11/23:      1: hx of lymphoma: DBLCL: S/P SURGERY RUL wedge resection and LN biopsy : await results:   2: Pleural effusion : not much of chest xray  : but ct scan chest reveals more pleural effusion on rt side then left: s/p thoracentesis : sent for cultures:  has exudative effusion ? malignancy ? lymphoma : await flow cyto   3: HLD: : per PMD  '4: ANCA ASSOCIATED VASCULITIS : SHE HAD FEVER:  RHEUMATOLOGY FOLLOWING ON ANTIBIOTICS: AFEBRILE IN LAST 72 HOURS AND IS  off CEFEPIME: now  5: now with covid : on remdesivir:  : she is on  2 L of oxygen : but on record she did not desaturate too much  : she is on it for comfort:  demi acp : to remove oxygen and see her real o2 sao2: HER D DIMER is high   ut she already had negative pe:     dw acp    11/24:    1: hx of lymphoma: DBLCL: S/P SURGERY RUL wedge resection and LN biopsy : await results:   2: Pleural effusion : not much of chest xray  : but ct scan chest reveals more pleural effusion on rt side then left: s/p thoracentesis : sent for cultures:  has exudative effusion ? malignancy ? lymphoma :   3: HLD: : per PMD  '4: ANCA ASSOCIATED VASCULITIS : SHE HAD FEVER:  RHEUMATOLOGY FOLLOWING ON ANTIBIOTICS: AFEBRILE IN LAST 72 HOURS AND IS  off CEFEPIME: now  5: now with covid : on remdesivir still   : she is on  2 L of oxygen : but on record she did not desaturate too much  : she is on it for comfort:  dw acp : to remove oxygen and see her real o2 sao2: HER D DIMER is high   but she already had negative pe: not on steroids per ID     dw acp    11/25:    1: hx of lymphoma: DBLCL: S/P SURGERY RUL wedge resection and LN biopsy : previous ebus biopsy was negative:  no granulomas reproted  2: Pleural effusion : not much of chest xray  : but ct scan chest reveals more pleural effusion on rt side then left: s/p thoracentesis : sent for cultures:  has exudative effusion ? malignancy ? lymphoma :   3: HLD: : per PMD  '4: ANCA ASSOCIATED VASCULITIS : SHE HAD FEVER:  RHEUMATOLOGY FOLLOWING ON ANTIBIOTICS: AFEBRILE IN LAST 72 HOURS AND IS  off CEFEPIME: now  5: now with covid : on remdesivir still   : she is on  2 L of oxygen : but on record she did not desaturate too much  : she is on it for comfort:  dw acp : to remove oxygen and see her real o2 sao2: HER D DIMER is high   but she already had negative pe: not on steroids per ID     dw acp    11/27:    1: hx of lymphoma: DBLCL: S/P SURGERY RUL wedge resection and LN biopsy : previous ebus biopsy was negative:  no granulomas reported  2: Pleural effusion : not much of chest xray  : but ct scan chest reveals more pleural effusion on rt side then left: s/p thoracentesis : sent for cultures:  has exudative effusion ? malignancy ? lymphoma :   3: HLD: : per PMD  '4: ANCA ASSOCIATED VASCULITIS : SHE HAD FEVER:  RHEUMATOLOGY FOLLOWING ON ANTIBIOTICS: AFEBRILE IN LAST 72 HOURS AND IS  off CEFEPIME: now  5: now with covid : on remdesivir still   :dexa started on for wheezing +    dw PMD    11/28:    1: hx of lymphoma: DBLCL: S/P SURGERY RUL wedge resection and LN biopsy : previous ebus biopsy was negative:  no granulomas reported  2: Pleural effusion : not much of chest xray  : but ct scan chest reveals more pleural effusion on rt side then left: s/p thoracentesis : sent for cultures:  has exudative effusion ? malignancy ? lymphoma :   3: HLD: : per PMD  '4: ANCA ASSOCIATED VASCULITIS : SHE HAD FEVER:  RHEUMATOLOGY FOLLOWING ON ANTIBIOTICS: AFEBRILE IN LAST 72 HOURS AND IS  off CEFEPIME: now :per rheum   5: now with covid : on remdesivir still   :and cont dexa for now : now not much of wheezing:     demi PMD

## 2022-11-28 NOTE — PROGRESS NOTE ADULT - ASSESSMENT
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), s/p LVATS, LUIS wedge resection (2021) direct admit for RVATS, RUL Nodule Biopsy w/ IR marking    #Tachycardia, WCT, SVT  -WCT in setting of hypokalemia   -cont metoprolol as ordered  -recent echo w normal LVEF and mild-moderate MS    #B cell Lymphoma s/p RVATS, RUL nodule biopsy  -onc followup  -s/p thoracentesis 11/17    #H/o DVT/PE  -LE dopplers neg  -CTPA noted, no pe    #HFpEF  -cxr with inc effusions, inter edema  -s/p iv lasix 11/23  -LASIX IV as needed    #covid   -tx per med/pulmo    #anemia   -prbc's per med    #R/o vasculitis   -w/u per primary team     35 minutes spent on total encounter; more than 50% of the visit was spent counseling and/or coordinating care by the attending physician.

## 2022-11-28 NOTE — H&P PST ADULT - PSY GEN HX ROS MEA POS PC
Daily alcohol use; vodka; last drink was on 11/24; attending AA meetings and with active sponsor depression/anxiety

## 2022-11-28 NOTE — PROGRESS NOTE ADULT - SUBJECTIVE AND OBJECTIVE BOX
[facility] [place] [facility]   MD RONEL FRIAS MD KRISTINE SOLTANPOUR, DO ANGELA WONG, PA    TEL:  OFFICE: [phone]  From 5pm-7am Answering Service [phone]    -- RENAL FOLLOW UP NOTE ---Date of Service 11-28-22 @ 11:41    Patient is a 81y old  Female who presents with a chief complaint of RVATS, RUL Nodule Biopsy w/ IR marking (28 Nov 2022 09:51)      Patient seen and examined at bedside. No chest pain/sob    VITALS:  T(F): 97.6 (11-28-22 @ 10:40), Max: 98.1 (11-27-22 @ 18:13)  HR: 64 (11-28-22 @ 10:40)  BP: 141/57 (11-28-22 @ 10:40)  RR: 17 (11-28-22 @ 10:40)  SpO2: 99% (11-28-22 @ 10:40)  Wt(kg): --        PHYSICAL EXAM:  General: NAD  Neck: No JVD  Respiratory: +rhonchi  Cardiovascular: S1, S2, RRR  Gastrointestinal: BS+, soft, NT/ND  Extremities: No peripheral edema    Hospital Medications:   MEDICATIONS  (STANDING):  acetaminophen   IVPB .. 750 milliGRAM(s) IV Intermittent once  albuterol    90 MICROgram(s) HFA Inhaler 2 Puff(s) Inhalation every 6 hours  citalopram 10 milliGRAM(s) Oral daily  dexAMETHasone  Injectable 6 milliGRAM(s) IV Push daily  enoxaparin Injectable 40 milliGRAM(s) SubCutaneous every 24 hours  fluticasone propionate 50 MICROgram(s)/spray Nasal Spray 1 Spray(s) Both Nostrils two times a day  folic acid 1 milliGRAM(s) Oral daily  guaiFENesin  milliGRAM(s) Oral every 12 hours  ipratropium 17 MICROgram(s) HFA Inhaler 2 Puff(s) Inhalation every 6 hours  levalbuterol 45 MICROgram(s) HFA Inhaler 2 Puff(s) Inhalation every 6 hours  lidocaine   4% Patch 1 Patch Transdermal daily  melatonin 3 milliGRAM(s) Oral at bedtime  metoprolol tartrate 50 milliGRAM(s) Oral two times a day  pantoprazole    Tablet 40 milliGRAM(s) Oral before breakfast  polyethylene glycol 3350 17 Gram(s) Oral daily  senna 1 Tablet(s) Oral daily  sodium bicarbonate 650 milliGRAM(s) Oral three times a day      LABS:  11-28    140  |  105  |  23  ----------------------------<  137<H>  4.0   |  24  |  0.52    Ca    8.4      28 Nov 2022 06:09  Phos  2.8     11-28  Mg     2.10     11-28    TPro  6.0  /  Alb  2.6<L>  /  TBili  0.5  /  DBili      /  AST  8   /  ALT  <5  /  AlkPhos  57  11-28    Creatinine Trend: 0.52 <--, 0.48 <--, 0.47 <--, 0.48 <--, 0.47 <--, 0.45 <--, 0.46 <--, 0.49 <--    Albumin, Serum: 2.6 g/dL (11-28 @ 06:09)  Phosphorus Level, Serum: 2.8 mg/dL (11-28 @ 06:09)                              8.8    9.93  )-----------( 288      ( 28 Nov 2022 06:09 )             28.9     Urine Studies:  Urinalysis - [11-17-22 @ 20:04]      Color Judy / Appearance Clear / SG 1.044 / pH 6.5      Gluc Negative / Ketone Small  / Bili Small / Urobili >12 mg/dL       Blood Small / Protein 100 mg/dL / Leuk Est Negative / Nitrite Negative      RBC 12 / WBC 11 / Hyaline 9 / Gran  / Sq Epi  / Non Sq Epi 11 / Bacteria Few      Iron 97, TIBC <127, %sat --      [11-10-22 @ 06:55]  Ferritin 1570      [11-25-22 @ 06:00]  Vitamin D (25OH) 38.3      [11-15-22 @ 06:47]  Lipid: chol 84, TG 81, HDL 22, LDL --      [10-01-22 @ 07:10]    HBsAb Nonreact      [11-11-22 @ 04:15]  HBsAg Nonreact      [11-11-22 @ 04:15]  HBcAb Nonreact      [11-11-22 @ 04:15]  HCV 0.10, Nonreact      [11-15-22 @ 06:47]    ANCA: cANCA Negative, pANCA Negative, atypical ANCA Indeterminate Method interference due to CATHERINE Fluorescence      [11-10-22 @ 06:55]  MPO-ANCA <5.0, interpretation: Negative      [11-10-22 @ 06:55]  PR3-ANCA <5.0, interpretation: Negative      [11-10-22 @ 06:55]    RADIOLOGY & ADDITIONAL STUDIES:

## 2022-11-29 NOTE — PROGRESS NOTE ADULT - ASSESSMENT
80 y/o F PMhx diffuse large B cell lymphoma s/p R-CHOP, depression, GERD, PE/DVT (4/2021 no longer on Eliquis), ANCA-vasculitis (20 y/a, not on therapy), s/p LVATS, LUIS wedge resection (2021) who presented as for RVATS, RUL Nodule Biopsy    COVID  s/p remdesivir x 5 day course, completed 11/25  trend inflammatory markers  monitor SpO2, supplemental O2 as needed, wean as tolerated  c/w decadron  supportive care  isolation per infection control policy   discharge planning    vasculitis  pathology concerning for vasculitis, special stains negative for microorganisms  d/u rheumatology recs- transition to prednisone 20mg daily after decadron complete & f/u outpt    fever- resolved  febrile to 101.5 on 11/14, afebrile since  no evidence of SSTI  quant gold negative  US LE neg for DVT  procal <.5  CTA chest- Moderate right and small left pleural effusions with some interlobular septal thickening suggestive of pulmonary edema. Small right pneumothorax.  s/p thoracentesis 11/17, exudative effusion, cultures- no growth  blood cultures- no growth final  pathology special stains negative for microorganisms  all cultures w/o growth, cefepime discontinued 11/22    DLBCL  s/p R VATS with RUL wedge resection and mediastinal LN dissection on 11/10/22  pathology- heterogenous population of lymphocytes; not consistent w/ recurrence  hem/onc following    penicillin allergy  reports reaction- welts as a child  tolerated ceftriaxone and cefepime    Gio Tate M.D.  [facility], Division of Infectious Diseases  [phone]  After 5pm on weekdays and all day on weekends - please call [phone]

## 2022-11-29 NOTE — PROGRESS NOTE ADULT - SUBJECTIVE AND OBJECTIVE BOX
[facility], Division of Infectious Diseases  ANDREW Rodríguez Y. Patel, S. Shah, G. Bebeto  [phone]  ([phone] - weekdays after 5pm and weekends)    Name: BETTIE PRATER  Age/Gender: 81y Female  MRN: [id number]    Interval History:  Patient seen this morning.   Notes reviewed.   No concerning overnight events.  Afebrile.   states she feels good today  reports not eating much    Allergies: codeine (Nausea)  doxycycline (Nausea)  penicillin (Hives)      Objective:  Vitals:   T(F): 97.8 (11-29-22 @ 05:50), Max: 98.9 (11-28-22 @ 21:30)  HR: 73 (11-29-22 @ 05:50) (64 - 87)  BP: 151/55 (11-29-22 @ 05:50) (123/53 - 151/55)  RR: 17 (11-29-22 @ 05:50) (17 - 18)  SpO2: 97% (11-29-22 @ 05:50) (97% - 99%)  Physical Examination:  General: no acute distress  HEENT: anicteric, NC  Cardio: normal rate  Resp: mild wheezes b/l  Abd: soft, NT, ND  Ext: no LE edema  Skin: warm, dry    Laboratory Studies:  CBC:                       8.7    10.72 )-----------( 316      ( 29 Nov 2022 06:00 )             29.1     WBC Trend:  10.72 11-29-22 @ 06:00  9.93 11-28-22 @ 06:09  5.50 11-27-22 @ 06:54  8.53 11-26-22 @ 06:56  8.88 11-25-22 @ 06:00  8.88 11-24-22 @ 02:28  11.71 11-23-22 @ 06:07    CMP: 11-29    140  |  104  |  23  ----------------------------<  98  3.9   |  26  |  0.49<L>    Ca    8.3<L>      29 Nov 2022 06:00  Phos  3.0     11-29  Mg     2.10     11-29    TPro  6.0  /  Alb  2.8<L>  /  TBili  0.5  /  DBili  x   /  AST  12  /  ALT  <5<L>  /  AlkPhos  55  11-29      LIVER FUNCTIONS - ( 29 Nov 2022 06:00 )  Alb: 2.8 g/dL / Pro: 6.0 g/dL / ALK PHOS: 55 U/L / ALT: <5 U/L / AST: 12 U/L / GGT: x               Microbiology: reviewed     Culture - Blood (collected 11-18-22 @ 09:45)  Source: .Blood Blood-Peripheral  Final Report (11-23-22 @ 13:01):    No Growth Final    Culture - Blood (collected 11-18-22 @ 06:00)  Source: .Blood Blood-Peripheral  Final Report (11-23-22 @ 13:01):    No Growth Final    Culture - Body Fluid with Gram Stain (collected 11-17-22 @ 16:04)  Source: Pleural Fl Pleural Fluid  Gram Stain (11-17-22 @ 22:06):    polymorphonuclear leukocytes seen    No organisms seen    by cytocentrifuge  Final Report (11-22-22 @ 18:19):    No growth at 5 days        Radiology: reviewed     Medications:  acetaminophen     Tablet .. 650 milliGRAM(s) Oral every 6 hours PRN  acetaminophen   IVPB .. 750 milliGRAM(s) IV Intermittent once  albuterol    90 MICROgram(s) HFA Inhaler 2 Puff(s) Inhalation every 6 hours  benzonatate 200 milliGRAM(s) Oral three times a day PRN  citalopram 10 milliGRAM(s) Oral daily  dexAMETHasone  Injectable 6 milliGRAM(s) IV Push daily  enoxaparin Injectable 40 milliGRAM(s) SubCutaneous every 24 hours  fluticasone propionate 50 MICROgram(s)/spray Nasal Spray 1 Spray(s) Both Nostrils two times a day  folic acid 1 milliGRAM(s) Oral daily  guaiFENesin  milliGRAM(s) Oral every 12 hours  ipratropium 17 MICROgram(s) HFA Inhaler 2 Puff(s) Inhalation every 6 hours  levalbuterol 45 MICROgram(s) HFA Inhaler 2 Puff(s) Inhalation every 6 hours  lidocaine   4% Patch 1 Patch Transdermal daily  melatonin 3 milliGRAM(s) Oral at bedtime  metoprolol tartrate 50 milliGRAM(s) Oral two times a day  naloxone Injectable 0.1 milliGRAM(s) IV Push every 3 minutes PRN  ondansetron Injectable 4 milliGRAM(s) IV Push every 6 hours PRN  pantoprazole    Tablet 40 milliGRAM(s) Oral before breakfast  polyethylene glycol 3350 17 Gram(s) Oral daily  senna 1 Tablet(s) Oral daily  sodium bicarbonate 650 milliGRAM(s) Oral three times a day    Antimicrobials:

## 2022-11-29 NOTE — CHART NOTE - NSCHARTNOTEFT_GEN_A_CORE
Attempted to wean pt off O2 however notified by RN that patient then desatted to 79% nonsustaining with good pleth. Pt placed on 3L and O2 sat improved to high 90s. Pts wheezing improved and pt states that she feels better today. IV Lasix 20mg given x1. Discussed with Dr. Arias.

## 2022-11-29 NOTE — PROGRESS NOTE ADULT - SUBJECTIVE AND OBJECTIVE BOX
[facility]   MD RONEL FRIAS MD KRISTINE SOLTANPOUR, DO ANGELA WONG, PA    TEL:  OFFICE: [phone]  From 5pm-7am Answering Service [phone]    -- RENAL FOLLOW UP NOTE ---Date of Service 11-29-22 @ 14:22    Patient is a 81y old  Female who presents with a chief complaint of RVATS, RUL Nodule Biopsy w/ IR marking (29 Nov 2022 11:41)      Patient seen and examined at bedside. No chest pain/sob    VITALS:  T(F): 97.5 (11-29-22 @ 12:40), Max: 98.9 (11-28-22 @ 21:30)  HR: 83 (11-29-22 @ 12:40)  BP: 143/61 (11-29-22 @ 12:40)  RR: 18 (11-29-22 @ 12:40)  SpO2: 93% (11-29-22 @ 12:40)  Wt(kg): --        PHYSICAL EXAM:  General: NAD  Neck: No JVD  Respiratory: wheeze  Cardiovascular: S1, S2, RRR  Gastrointestinal: BS+, soft, NT/ND  Extremities: No peripheral edema    Hospital Medications:   MEDICATIONS  (STANDING):  acetaminophen   IVPB .. 750 milliGRAM(s) IV Intermittent once  albuterol    90 MICROgram(s) HFA Inhaler 2 Puff(s) Inhalation every 6 hours  citalopram 10 milliGRAM(s) Oral daily  dexAMETHasone  Injectable 6 milliGRAM(s) IV Push daily  enoxaparin Injectable 40 milliGRAM(s) SubCutaneous every 24 hours  fluticasone propionate 50 MICROgram(s)/spray Nasal Spray 1 Spray(s) Both Nostrils two times a day  folic acid 1 milliGRAM(s) Oral daily  furosemide   Injectable 20 milliGRAM(s) IV Push once  guaiFENesin  milliGRAM(s) Oral every 12 hours  ipratropium 17 MICROgram(s) HFA Inhaler 2 Puff(s) Inhalation every 6 hours  levalbuterol 45 MICROgram(s) HFA Inhaler 2 Puff(s) Inhalation every 6 hours  lidocaine   4% Patch 1 Patch Transdermal daily  melatonin 3 milliGRAM(s) Oral at bedtime  metoprolol tartrate 50 milliGRAM(s) Oral two times a day  pantoprazole    Tablet 40 milliGRAM(s) Oral before breakfast  polyethylene glycol 3350 17 Gram(s) Oral daily  senna 1 Tablet(s) Oral daily  sodium bicarbonate 650 milliGRAM(s) Oral three times a day      LABS:  11-29    140  |  104  |  23  ----------------------------<  98  3.9   |  26  |  0.49<L>    Ca    8.3<L>      29 Nov 2022 06:00  Phos  3.0     11-29  Mg     2.10     11-29    TPro  6.0  /  Alb  2.8<L>  /  TBili  0.5  /  DBili      /  AST  12  /  ALT  <5<L>  /  AlkPhos  55  11-29    Creatinine Trend: 0.49 <--, 0.52 <--, 0.48 <--, 0.47 <--, 0.48 <--, 0.47 <--, 0.45 <--, 0.46 <--    Albumin, Serum: 2.8 g/dL (11-29 @ 06:00)  Phosphorus Level, Serum: 3.0 mg/dL (11-29 @ 06:00)                              8.7    10.72 )-----------( 316      ( 29 Nov 2022 06:00 )             29.1     Urine Studies:  Urinalysis - [11-17-22 @ 20:04]      Color Judy / Appearance Clear / SG 1.044 / pH 6.5      Gluc Negative / Ketone Small  / Bili Small / Urobili >12 mg/dL       Blood Small / Protein 100 mg/dL / Leuk Est Negative / Nitrite Negative      RBC 12 / WBC 11 / Hyaline 9 / Gran  / Sq Epi  / Non Sq Epi 11 / Bacteria Few      Iron 97, TIBC <127, %sat --      [11-10-22 @ 06:55]  Ferritin 1570      [11-25-22 @ 06:00]  Vitamin D (25OH) 38.3      [11-15-22 @ 06:47]  Lipid: chol 84, TG 81, HDL 22, LDL --      [10-01-22 @ 07:10]    HBsAb Nonreact      [11-11-22 @ 04:15]  HBsAg Nonreact      [11-11-22 @ 04:15]  HBcAb Nonreact      [11-11-22 @ 04:15]  HCV 0.10, Nonreact      [11-15-22 @ 06:47]    ANCA: cANCA Negative, pANCA Negative, atypical ANCA Indeterminate Method interference due to CATHERINE Fluorescence      [11-10-22 @ 06:55]  MPO-ANCA <5.0, interpretation: Negative      [11-10-22 @ 06:55]  PR3-ANCA <5.0, interpretation: Negative      [11-10-22 @ 06:55]    RADIOLOGY & ADDITIONAL STUDIES:

## 2022-11-29 NOTE — PROGRESS NOTE ADULT - SUBJECTIVE AND OBJECTIVE BOX
CARDIOLOGY FOLLOW UP - Dr. Hooker  Date of Service: 11/29/2022  CC: no events    Review of Systems:  Constitutional: No fever, weight loss, or fatigue  Respiratory: No cough, wheezing, or hemoptysis, no shortness of breath  Cardiovascular: No chest pain, palpitations, passing out, dizziness, or leg swelling  Gastrointestinal: No abd or epigastric pain. No nausea, vomiting, or hematemesis; no diarrhea or consiptaiton, no melena or hematochezia  Vascular: No edema     TELEMETRY:    PHYSICAL EXAM:  T(C): 36.6 (11-29-22 @ 05:50), Max: 37.2 (11-28-22 @ 21:30)  HR: 73 (11-29-22 @ 05:50) (73 - 87)  BP: 151/55 (11-29-22 @ 05:50) (123/53 - 151/55)  RR: 17 (11-29-22 @ 05:50) (17 - 18)  SpO2: 97% (11-29-22 @ 05:50) (97% - 99%)  Wt(kg): --  I&O's Summary      Appearance: Normal	  Cardiovascular: Normal S1 S2,RRR, No JVD, No murmurs  Respiratory: Lungs clear to auscultation	  Gastrointestinal:  Soft, Non-tender, + BS	  Extremities: Normal range of motion, No clubbing, cyanosis or edema  Vascular: Peripheral pulses palpable 2+ bilaterally       Home Medications:  citalopram 10 mg oral tablet: 1 tab(s) orally once a day (04 Nov 2022 15:16)  ezetimibe 10 mg oral tablet: 1 tab(s) orally once a day (04 Nov 2022 15:16)  folic acid 1 mg oral tablet: 1 tab(s) orally once a day (04 Nov 2022 15:16)  omeprazole 20 mg oral delayed release capsule: 1 cap(s) orally prn (04 Nov 2022 15:16)  Sodium Chloride 1 g oral tablet: daily (04 Nov 2022 15:16)        MEDICATIONS  (STANDING):  acetaminophen   IVPB .. 750 milliGRAM(s) IV Intermittent once  albuterol    90 MICROgram(s) HFA Inhaler 2 Puff(s) Inhalation every 6 hours  citalopram 10 milliGRAM(s) Oral daily  dexAMETHasone  Injectable 6 milliGRAM(s) IV Push daily  enoxaparin Injectable 40 milliGRAM(s) SubCutaneous every 24 hours  fluticasone propionate 50 MICROgram(s)/spray Nasal Spray 1 Spray(s) Both Nostrils two times a day  folic acid 1 milliGRAM(s) Oral daily  guaiFENesin  milliGRAM(s) Oral every 12 hours  ipratropium 17 MICROgram(s) HFA Inhaler 2 Puff(s) Inhalation every 6 hours  levalbuterol 45 MICROgram(s) HFA Inhaler 2 Puff(s) Inhalation every 6 hours  lidocaine   4% Patch 1 Patch Transdermal daily  melatonin 3 milliGRAM(s) Oral at bedtime  metoprolol tartrate 50 milliGRAM(s) Oral two times a day  pantoprazole    Tablet 40 milliGRAM(s) Oral before breakfast  polyethylene glycol 3350 17 Gram(s) Oral daily  senna 1 Tablet(s) Oral daily  sodium bicarbonate 650 milliGRAM(s) Oral three times a day        EKG:  RADIOLOGY:  DIAGNOSTIC TESTING:  [ ] Echocardiogram:  [ ] Catherterization:  [ ] Stress Test:  OTHER:     LABS:	 	                          8.7    10.72 )-----------( 316      ( 29 Nov 2022 06:00 )             29.1     11-29    140  |  104  |  23  ----------------------------<  98  3.9   |  26  |  0.49<L>    Ca    8.3<L>      29 Nov 2022 06:00  Phos  3.0     11-29  Mg     2.10     11-29    TPro  6.0  /  Alb  2.8<L>  /  TBili  0.5  /  DBili  x   /  AST  12  /  ALT  <5<L>  /  AlkPhos  55  11-29          CARDIAC MARKERS:

## 2022-11-29 NOTE — PROGRESS NOTE ADULT - ASSESSMENT
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 Patient is admitted to be optimized for her surgical procedure.    Plan: OR tomorrow for RVATS, RUL Nodule Biopsy w/ IR Marking.    1: hx of lymphoma: DBLCL  2: Pleural effusion :  3: HLD:   '4: ANCA ASSOCIATED VASCULITIS      11/17:    1: hx of lymphoma: DBLCL: S/P SURGERY RUL wedge resection and LN biopsy : await results:   2: Pleural effusion : not much of chest xray  : but ct scan chest revelas more pleurl effusion on rt sdie then left:  send for chem and cultures   3: HLD: : per PMD  '4: ANCA ASSOCIATED VASCULITIS : sHE HAD FEVER:  RHEUMATOLOGY FOLLOWING ON ANTIBIOTICS:    DW THORACIC     11/18:    1: hx of lymphoma: DBLCL: S/P SURGERY RUL wedge resection and LN biopsy : await results:   2: Pleural effusion : not much of chest xray  : but ct scan chest reveals more pleural effusion on rt side then left: s/p thoracentesis : sent for cultures:  has exudative effusion ? malignancy ? lymphoma  3: HLD: : per PMD  '4: ANCA ASSOCIATED VASCULITIS : ssHE HAD FEVER:  RHEUMATOLOGY FOLLOWING ON ANTIBIOTICS: AFEBRILE IN LAST 24 HOURS AND IS ON CEFEPIME:     DW THORACIC AND ACP     11/20:      1: hx of lymphoma: DBLCL: S/P SURGERY RUL wedge resection and LN biopsy : await results:   2: Pleural effusion : not much of chest xray  : but ct scan chest reveals more pleural effusion on rt side then left: s/p thoracentesis : sent for cultures:  has exudative effusion ? malignancy ? lymphoma : await flow cyto   3: HLD: : per PMD  '4: ANCA ASSOCIATED VASCULITIS : ssHE HAD FEVER:  RHEUMATOLOGY FOLLOWING ON ANTIBIOTICS: AFEBRILE IN LAST 72 HOURS AND IS ON CEFEPIME:     DW  ACP     11/21:      1: hx of lymphoma: DBLCL: S/P SURGERY RUL wedge resection and LN biopsy : await results:   2: Pleural effusion : not much of chest xray  : but ct scan chest reveals more pleural effusion on rt side then left: s/p thoracentesis : sent for cultures:  has exudative effusion ? malignancy ? lymphoma : await flow cyto   3: HLD: : per PMD  '4: ANCA ASSOCIATED VASCULITIS : SHE HAD FEVER:  RHEUMATOLOGY FOLLOWING ON ANTIBIOTICS: AFEBRILE IN LAST 72 HOURS AND IS still off CEFEPIME: now  5: now with covid : on remdesivir:  : she is on  2 L of oxygen : but on record she did not desaturate too much      DW  ACP     11/22    1: hx of lymphoma: DBLCL: S/P SURGERY RUL wedge resection and LN biopsy : await results:   2: Pleural effusion : not much of chest xray  : but ct scan chest reveals more pleural effusion on rt side then left: s/p thoracentesis : sent for cultures:  has exudative effusion ? malignancy ? lymphoma : await flow cyto   3: HLD: : per PMD  '4: ANCA ASSOCIATED VASCULITIS : SHE HAD FEVER:  RHEUMATOLOGY FOLLOWING ON ANTIBIOTICS: AFEBRILE IN LAST 72 HOURS AND IS  off CEFEPIME: now  5: now with covid : on remdesivir:  : she is on  2 L of oxygen : but on record she did not desaturate too much  : she is on it for comfort:  demi acp : to remove oxygen and see her real o2 sao2: HER D DIMER NEEDS TO BE CHECKED    dw acp    11/23:      1: hx of lymphoma: DBLCL: S/P SURGERY RUL wedge resection and LN biopsy : await results:   2: Pleural effusion : not much of chest xray  : but ct scan chest reveals more pleural effusion on rt side then left: s/p thoracentesis : sent for cultures:  has exudative effusion ? malignancy ? lymphoma : await flow cyto   3: HLD: : per PMD  '4: ANCA ASSOCIATED VASCULITIS : SHE HAD FEVER:  RHEUMATOLOGY FOLLOWING ON ANTIBIOTICS: AFEBRILE IN LAST 72 HOURS AND IS  off CEFEPIME: now  5: now with covid : on remdesivir:  : she is on  2 L of oxygen : but on record she did not desaturate too much  : she is on it for comfort:  demi acp : to remove oxygen and see her real o2 sao2: HER D DIMER is high   ut she already had negative pe:     dw acp    11/24:    1: hx of lymphoma: DBLCL: S/P SURGERY RUL wedge resection and LN biopsy : await results:   2: Pleural effusion : not much of chest xray  : but ct scan chest reveals more pleural effusion on rt side then left: s/p thoracentesis : sent for cultures:  has exudative effusion ? malignancy ? lymphoma :   3: HLD: : per PMD  '4: ANCA ASSOCIATED VASCULITIS : SHE HAD FEVER:  RHEUMATOLOGY FOLLOWING ON ANTIBIOTICS: AFEBRILE IN LAST 72 HOURS AND IS  off CEFEPIME: now  5: now with covid : on remdesivir still   : she is on  2 L of oxygen : but on record she did not desaturate too much  : she is on it for comfort:  dw acp : to remove oxygen and see her real o2 sao2: HER D DIMER is high   but she already had negative pe: not on steroids per ID     dw acp    11/25:    1: hx of lymphoma: DBLCL: S/P SURGERY RUL wedge resection and LN biopsy : previous ebus biopsy was negative:  no granulomas reproted  2: Pleural effusion : not much of chest xray  : but ct scan chest reveals more pleural effusion on rt side then left: s/p thoracentesis : sent for cultures:  has exudative effusion ? malignancy ? lymphoma :   3: HLD: : per PMD  '4: ANCA ASSOCIATED VASCULITIS : SHE HAD FEVER:  RHEUMATOLOGY FOLLOWING ON ANTIBIOTICS: AFEBRILE IN LAST 72 HOURS AND IS  off CEFEPIME: now  5: now with covid : on remdesivir still   : she is on  2 L of oxygen : but on record she did not desaturate too much  : she is on it for comfort:  dw acp : to remove oxygen and see her real o2 sao2: HER D DIMER is high   but she already had negative pe: not on steroids per ID     dw acp    11/27:    1: hx of lymphoma: DBLCL: S/P SURGERY RUL wedge resection and LN biopsy : previous ebus biopsy was negative:  no granulomas reported  2: Pleural effusion : not much of chest xray  : but ct scan chest reveals more pleural effusion on rt side then left: s/p thoracentesis : sent for cultures:  has exudative effusion ? malignancy ? lymphoma :   3: HLD: : per PMD  '4: ANCA ASSOCIATED VASCULITIS : SHE HAD FEVER:  RHEUMATOLOGY FOLLOWING ON ANTIBIOTICS: AFEBRILE IN LAST 72 HOURS AND IS  off CEFEPIME: now  5: now with covid : on remdesivir still   :dexa started on for wheezing +    dw PMD    11/28:    1: hx of lymphoma: DBLCL: S/P SURGERY RUL wedge resection and LN biopsy : previous ebus biopsy was negative:  no granulomas reported  2: Pleural effusion : not much of chest xray  : but ct scan chest reveals more pleural effusion on rt side then left: s/p thoracentesis : sent for cultures:  has exudative effusion ? malignancy ? lymphoma :   3: HLD: : per PMD  '4: ANCA ASSOCIATED VASCULITIS : SHE HAD FEVER:  RHEUMATOLOGY FOLLOWING ON ANTIBIOTICS: AFEBRILE IN LAST 72 HOURS AND IS  off CEFEPIME: now :per rheum   5: now with covid : on remdesivir still   :and cont dexa for now : now not much of wheezing:     dw PMD    11/29:    1: Hx of lymphoma: DBLCL: S/P SURGERY RUL wedge resection and LN biopsy : previous ebus biopsy was negative:  no granulomas reported  2: Pleural effusion : not much of chest xray  : but ct scan chest reveals more pleural effusion on rt side then left: s/p thoracentesis : sent for cultures:  has exudative effusion ? malignancy ? lymphoma :   3: HLD: : per PMD  '4: ANCA ASSOCIATED VASCULITIS : SHE HAD FEVER:  RHEUMATOLOGY FOLLOWING ON ANTIBIOTICS: AFEBRILE IN LAST 72 HOURS AND IS  off CEFEPIME: now :per rheum : she is already on steroids:   5: now with covid : on remdesivir still   :and cont dexa for now  fr a totola of 10 days  : now not much of wheezing:     dw acp " she does feel better no concerns

## 2022-11-30 NOTE — PROGRESS NOTE ADULT - ASSESSMENT
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 Patient states that recently she has been feeling very fatigued.    Fatigue/dyspnea, with hx of Lymphoma  - Recent TTE on 11/3/22 reviewed: normal LV. outpt card Dr. Ady Cooper  - PT also saw pulm Mack Tucker in the office, with some concern for her overall status. She was hypotensive to systolic 90s in the office.  (now BP improves s/p IVF here).   - s/p thoracoscopic biopsy of mediastinal lymph node and Lung wedge resection 11/10/22  - path results favoring vasculitis, but final stains to r/o infection are still pending; no evidence for lymphoma. Cytology also came back negative.   - 11/22/22 a pt felt more SOB early morning hours, placed on 2LNCO2, CXR  some moderate interstitial edema. Given IV Lasix 20 mg x 1. improved.   - comfortable on nasal canula, better post diuretic. wean O2 as tolerated   - appreciate rheum, heme-onc f/u  - no plan for immunosuppressants such as cellcept while being treated for COVID19    Wheezing on 11/26  - comfortable on nasal canula  - started Decadron 6 mg qdaily IV for covid. After completion of 10 days course, can be switched to Prednisone 20 mg per rheum. then f/u outpt at rheum clinic.   - repeat CXR stable.  - d/w rehum and pulm   - s/p Lasix 20 mg x 1 as needed    Fever: either 2' ANCA-mediated vasculitis vs infection  - UA, CXR unimpressive. RVP neg.   - no leukocytosis, remain stable clinically  - procal low.  - LE venous dopplers (-) for DVT  - s/p rt thoracentesis 11/17/22  - cefepime started 11/18/22 following worsened leukocytosis 11/18/22   - blood cxs 11/18/22 --> (-)  - cefepime stopped 11/22/22  - ID appreciated  - After completion of 10 days course of decadron, can be switched to Prednisone 20 mg per rheum    (+) COVID-19 11/21/22  - Remdesivir 11/21/22-->11/23/22  - steroid initiation per rheumatology service and ID.    - monitoring inflammatory markers  - ferritin, CRP downtrending  - d-dimer uptrending, Hep SQ BID switch to Lovenox SQ    - now downtrending    Rt pleural effusion  - CTA chest 11/16/22 revealed moderate rt effusion  - s/p 650 cc U/S-guided rt thoracentesis 11/17/22  - exudative but no neutrophilic predominance and initial Gram stain w/o organisms  - cultures neg.   - ID/ pulmonology consult appreciated  - monitoring off abx     Tachycardia likely 2' fever  - cont low-dose metoprolol  - card consulted (Dr. Hooker) appreciated    Anemia, unspecified  - hgb 8.1 lower than her baseline.  - no melena, no hematochezia. no BRBPR.   - recent Anemia work-up reviewed: normal vit b12, folate.  - repeat iron studies ordered    - s/p 2 units pRBC 11/1/22 per heme's note.   - s/p 1 unit pRBC 11/9/22 with appropriate post transfusion hgb.    - no melena, no hematochezia. no BRBPR.   - 11/21/22 --> s/p another unit of pRBC  - Lasix 20 mg PRN     Anxiety and depression  - stable, continue citalopram.  - Pt denied SI/HI ideations, denied visual and auditory hallucinations.     GERD (gastroesophageal reflux disease)  - continue PPI    Hyperlipidemia.   - continue home ezetimibe. okay to hold if nonformulary   - Lipid panel    DVT ppx   - SC heparin --> Lovenox SQ    Disposition  - PT: rehab. Pt now agreeable to rehab

## 2022-11-30 NOTE — PROGRESS NOTE ADULT - ASSESSMENT
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), s/p LVATS, LUIS wedge resection (2021) direct admit for RVATS, RUL Nodule Biopsy w/ IR marking    #Tachycardia, WCT, SVT  -WCT in setting of hypokalemia   -cont metoprolol as ordered  -recent echo w normal LVEF and mild-moderate MS    #B cell Lymphoma s/p RVATS, RUL nodule biopsy  -onc followup  -s/p thoracentesis 11/17    #H/o DVT/PE  -LE dopplers neg  -CTPA noted, no pe    #HFpEF  -cxr with inc effusions, inter edema  -s/p iv lasix 11/23. 11/29  -LASIX IV as needed    #covid   -tx per med/pulmo    #anemia   -prbc's per med    #R/o vasculitis   -w/u per primary team     35 minutes spent on total encounter; more than 50% of the visit was spent counseling and/or coordinating care by the attending physician.

## 2022-11-30 NOTE — PROGRESS NOTE ADULT - SUBJECTIVE AND OBJECTIVE BOX
Desatted to 79% last evening during attempt to wean off 3LNCO2    Vital Signs Last 24 Hrs  T(C): 36.5 (30 Nov 2022 09:30), Max: 36.6 (29 Nov 2022 18:19)  T(F): 97.7 (30 Nov 2022 09:30), Max: 97.9 (29 Nov 2022 18:19)  HR: 68 (30 Nov 2022 09:30) (43 - 91)  BP: 133/63 (30 Nov 2022 09:30) (129/67 - 163/61)  BP(mean): --  RR: 18 (30 Nov 2022 09:30) (18 - 20)  SpO2: 100% (30 Nov 2022 09:30) (79% - 100%)    I&O's Summary        PHYSICAL EXAM:  GENERAL: NAD, thin-elderly, comfortable on nasal canula.   HEAD:  Atraumatic, Normocephalic  EYES: EOMI, PERRLA, conjunctiva and sclera clear  NECK: Supple, No JVD  CHEST/LUNG: mild decrease breath sounds bilaterally; + wheeze much improved.   HEART: Regular rate and rhythm; No murmurs, rubs, or gallops  ABDOMEN: Soft, Nontender, Nondistended; Bowel sounds present  Neuro: AAOx3, no focal weakness   EXTREMITIES:  2+ Peripheral Pulses, No clubbing, cyanosis, trace edema    LABS:                        8.4    9.61  )-----------( 297      ( 30 Nov 2022 07:05 )             27.1     11-30    139  |  102  |  23  ----------------------------<  110<H>  3.7   |  27  |  0.46<L>    Ca    8.1<L>      30 Nov 2022 07:05  Phos  3.2     11-30  Mg     2.00     11-30    TPro  5.5<L>  /  Alb  2.7<L>  /  TBili  0.5  /  DBili  x   /  AST  15  /  ALT  6   /  AlkPhos  51  11-30      CAPILLARY BLOOD GLUCOSE                RADIOLOGY & ADDITIONAL TESTS:    Imaging Personally Reviewed:  [x] YES  [ ] NO    Will obtain old records:  [ ] YES  [x] NO

## 2022-11-30 NOTE — PROGRESS NOTE ADULT - SUBJECTIVE AND OBJECTIVE BOX
[facility] [place] [facility]   MD RONEL FRIAS MD KRISTINE SOLTANPOUR, DO ANGELA WONG, PA    TEL:  OFFICE: [phone]  From 5pm-7am Answering Service [phone]    -- RENAL FOLLOW UP NOTE ---Date of Service 11-30-22 @ 13:48    Patient is a 81y old  Female who presents with a chief complaint of RVATS, RUL Nodule Biopsy w/ IR marking (30 Nov 2022 12:01)      Patient seen and examined at bedside. No chest pain/sob    VITALS:  T(F): 97.5 (11-30-22 @ 12:43), Max: 97.9 (11-29-22 @ 18:19)  HR: 66 (11-30-22 @ 12:43)  BP: 122/62 (11-30-22 @ 13:00)  RR: 18 (11-30-22 @ 12:43)  SpO2: 100% (11-30-22 @ 09:30)  Wt(kg): --        PHYSICAL EXAM:  General: NAD  Neck: No JVD  Respiratory: +rhonchi  Cardiovascular: S1, S2, RRR  Gastrointestinal: BS+, soft, NT/ND  Extremities: No peripheral edema    Hospital Medications:   MEDICATIONS  (STANDING):  acetaminophen   IVPB .. 750 milliGRAM(s) IV Intermittent once  albuterol    90 MICROgram(s) HFA Inhaler 2 Puff(s) Inhalation every 6 hours  budesonide 160 MICROgram(s)/formoterol 4.5 MICROgram(s) Inhaler 2 Puff(s) Inhalation two times a day  citalopram 10 milliGRAM(s) Oral daily  dexAMETHasone  Injectable 6 milliGRAM(s) IV Push daily  enoxaparin Injectable 40 milliGRAM(s) SubCutaneous every 24 hours  fluticasone propionate 50 MICROgram(s)/spray Nasal Spray 1 Spray(s) Both Nostrils two times a day  folic acid 1 milliGRAM(s) Oral daily  guaiFENesin  milliGRAM(s) Oral every 12 hours  ipratropium 17 MICROgram(s) HFA Inhaler 2 Puff(s) Inhalation every 6 hours  levalbuterol 45 MICROgram(s) HFA Inhaler 2 Puff(s) Inhalation every 6 hours  lidocaine   4% Patch 1 Patch Transdermal daily  melatonin 3 milliGRAM(s) Oral at bedtime  metoprolol tartrate 50 milliGRAM(s) Oral two times a day  pantoprazole    Tablet 40 milliGRAM(s) Oral before breakfast  polyethylene glycol 3350 17 Gram(s) Oral daily  senna 1 Tablet(s) Oral daily  sodium bicarbonate 650 milliGRAM(s) Oral three times a day      LABS:  11-30    139  |  102  |  23  ----------------------------<  110<H>  3.7   |  27  |  0.46<L>    Ca    8.1<L>      30 Nov 2022 07:05  Phos  3.2     11-30  Mg     2.00     11-30    TPro  5.5<L>  /  Alb  2.7<L>  /  TBili  0.5  /  DBili      /  AST  15  /  ALT  6   /  AlkPhos  51  11-30    Creatinine Trend: 0.46 <--, 0.49 <--, 0.52 <--, 0.48 <--, 0.47 <--, 0.48 <--, 0.47 <--, 0.45 <--    Albumin, Serum: 2.7 g/dL (11-30 @ 07:05)  Phosphorus Level, Serum: 3.2 mg/dL (11-30 @ 07:05)                              8.4    9.61  )-----------( 297      ( 30 Nov 2022 07:05 )             27.1     Urine Studies:  Urinalysis - [11-17-22 @ 20:04]      Color Judy / Appearance Clear / SG 1.044 / pH 6.5      Gluc Negative / Ketone Small  / Bili Small / Urobili >12 mg/dL       Blood Small / Protein 100 mg/dL / Leuk Est Negative / Nitrite Negative      RBC 12 / WBC 11 / Hyaline 9 / Gran  / Sq Epi  / Non Sq Epi 11 / Bacteria Few      Iron 97, TIBC <127, %sat --      [11-10-22 @ 06:55]  Ferritin 1570      [11-25-22 @ 06:00]  Vitamin D (25OH) 38.3      [11-15-22 @ 06:47]  Lipid: chol 84, TG 81, HDL 22, LDL --      [10-01-22 @ 07:10]    HBsAb Nonreact      [11-11-22 @ 04:15]  HBsAg Nonreact      [11-11-22 @ 04:15]  HBcAb Nonreact      [11-11-22 @ 04:15]  HCV 0.10, Nonreact      [11-15-22 @ 06:47]    ANCA: cANCA Negative, pANCA Negative, atypical ANCA Indeterminate Method interference due to CATHERINE Fluorescence      [11-10-22 @ 06:55]  MPO-ANCA <5.0, interpretation: Negative      [11-10-22 @ 06:55]  PR3-ANCA <5.0, interpretation: Negative      [11-10-22 @ 06:55]    RADIOLOGY & ADDITIONAL STUDIES:

## 2022-11-30 NOTE — PROGRESS NOTE ADULT - SUBJECTIVE AND OBJECTIVE BOX
CARDIOLOGY FOLLOW UP - Dr. Hooker  Date of Service: 11/30/22  CC: no events, improved following lasix yest    Review of Systems:  Constitutional: No fever, weight loss, or fatigue  Respiratory: No cough, wheezing, or hemoptysis, no shortness of breath  Cardiovascular: No chest pain, palpitations, passing out, dizziness, or leg swelling  Gastrointestinal: No abd or epigastric pain. No nausea, vomiting, or hematemesis; no diarrhea or consiptaiton, no melena or hematochezia  Vascular: No edema     TELEMETRY:    PHYSICAL EXAM:  T(C): 36.5 (11-30-22 @ 09:30), Max: 36.6 (11-29-22 @ 18:19)  HR: 68 (11-30-22 @ 09:30) (43 - 91)  BP: 133/63 (11-30-22 @ 09:30) (129/67 - 163/61)  RR: 18 (11-30-22 @ 09:30) (18 - 20)  SpO2: 100% (11-30-22 @ 09:30) (79% - 100%)  Wt(kg): --  I&O's Summary      Appearance: Normal	  Cardiovascular: Normal S1 S2,RRR, No JVD, No murmurs  Respiratory: Lungs clear to auscultation	  Gastrointestinal:  Soft, Non-tender, + BS	  Extremities: Normal range of motion, No clubbing, cyanosis or edema  Vascular: Peripheral pulses palpable 2+ bilaterally       Home Medications:  citalopram 10 mg oral tablet: 1 tab(s) orally once a day (04 Nov 2022 15:16)  ezetimibe 10 mg oral tablet: 1 tab(s) orally once a day (04 Nov 2022 15:16)  folic acid 1 mg oral tablet: 1 tab(s) orally once a day (04 Nov 2022 15:16)  omeprazole 20 mg oral delayed release capsule: 1 cap(s) orally prn (04 Nov 2022 15:16)  Sodium Chloride 1 g oral tablet: daily (04 Nov 2022 15:16)        MEDICATIONS  (STANDING):  acetaminophen   IVPB .. 750 milliGRAM(s) IV Intermittent once  albuterol    90 MICROgram(s) HFA Inhaler 2 Puff(s) Inhalation every 6 hours  budesonide 160 MICROgram(s)/formoterol 4.5 MICROgram(s) Inhaler 2 Puff(s) Inhalation two times a day  citalopram 10 milliGRAM(s) Oral daily  dexAMETHasone  Injectable 6 milliGRAM(s) IV Push daily  enoxaparin Injectable 40 milliGRAM(s) SubCutaneous every 24 hours  fluticasone propionate 50 MICROgram(s)/spray Nasal Spray 1 Spray(s) Both Nostrils two times a day  folic acid 1 milliGRAM(s) Oral daily  guaiFENesin  milliGRAM(s) Oral every 12 hours  ipratropium 17 MICROgram(s) HFA Inhaler 2 Puff(s) Inhalation every 6 hours  levalbuterol 45 MICROgram(s) HFA Inhaler 2 Puff(s) Inhalation every 6 hours  lidocaine   4% Patch 1 Patch Transdermal daily  melatonin 3 milliGRAM(s) Oral at bedtime  metoprolol tartrate 50 milliGRAM(s) Oral two times a day  pantoprazole    Tablet 40 milliGRAM(s) Oral before breakfast  polyethylene glycol 3350 17 Gram(s) Oral daily  senna 1 Tablet(s) Oral daily  sodium bicarbonate 650 milliGRAM(s) Oral three times a day        EKG:  RADIOLOGY:  DIAGNOSTIC TESTING:  [ ] Echocardiogram:  [ ] Catherterization:  [ ] Stress Test:  OTHER:     LABS:	 	                          8.4    9.61  )-----------( 297      ( 30 Nov 2022 07:05 )             27.1     11-30    139  |  102  |  23  ----------------------------<  110<H>  3.7   |  27  |  0.46<L>    Ca    8.1<L>      30 Nov 2022 07:05  Phos  3.2     11-30  Mg     2.00     11-30    TPro  5.5<L>  /  Alb  2.7<L>  /  TBili  0.5  /  DBili  x   /  AST  15  /  ALT  6   /  AlkPhos  51  11-30          CARDIAC MARKERS:

## 2022-11-30 NOTE — PROGRESS NOTE ADULT - ASSESSMENT
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 Patient is admitted to be optimized for her surgical procedure.    Plan: OR tomorrow for RVATS, RUL Nodule Biopsy w/ IR Marking.    1: hx of lymphoma: DBLCL  2: Pleural effusion :  3: HLD:   '4: ANCA ASSOCIATED VASCULITIS      11/17:    1: hx of lymphoma: DBLCL: S/P SURGERY RUL wedge resection and LN biopsy : await results:   2: Pleural effusion : not much of chest xray  : but ct scan chest revelas more pleurl effusion on rt sdie then left:  send for chem and cultures   3: HLD: : per PMD  '4: ANCA ASSOCIATED VASCULITIS : sHE HAD FEVER:  RHEUMATOLOGY FOLLOWING ON ANTIBIOTICS:    DW THORACIC     11/18:    1: hx of lymphoma: DBLCL: S/P SURGERY RUL wedge resection and LN biopsy : await results:   2: Pleural effusion : not much of chest xray  : but ct scan chest reveals more pleural effusion on rt side then left: s/p thoracentesis : sent for cultures:  has exudative effusion ? malignancy ? lymphoma  3: HLD: : per PMD  '4: ANCA ASSOCIATED VASCULITIS : ssHE HAD FEVER:  RHEUMATOLOGY FOLLOWING ON ANTIBIOTICS: AFEBRILE IN LAST 24 HOURS AND IS ON CEFEPIME:     DW THORACIC AND ACP     11/20:      1: hx of lymphoma: DBLCL: S/P SURGERY RUL wedge resection and LN biopsy : await results:   2: Pleural effusion : not much of chest xray  : but ct scan chest reveals more pleural effusion on rt side then left: s/p thoracentesis : sent for cultures:  has exudative effusion ? malignancy ? lymphoma : await flow cyto   3: HLD: : per PMD  '4: ANCA ASSOCIATED VASCULITIS : ssHE HAD FEVER:  RHEUMATOLOGY FOLLOWING ON ANTIBIOTICS: AFEBRILE IN LAST 72 HOURS AND IS ON CEFEPIME:     DW  ACP     11/21:      1: hx of lymphoma: DBLCL: S/P SURGERY RUL wedge resection and LN biopsy : await results:   2: Pleural effusion : not much of chest xray  : but ct scan chest reveals more pleural effusion on rt side then left: s/p thoracentesis : sent for cultures:  has exudative effusion ? malignancy ? lymphoma : await flow cyto   3: HLD: : per PMD  '4: ANCA ASSOCIATED VASCULITIS : SHE HAD FEVER:  RHEUMATOLOGY FOLLOWING ON ANTIBIOTICS: AFEBRILE IN LAST 72 HOURS AND IS still off CEFEPIME: now  5: now with covid : on remdesivir:  : she is on  2 L of oxygen : but on record she did not desaturate too much      DW  ACP     11/22    1: hx of lymphoma: DBLCL: S/P SURGERY RUL wedge resection and LN biopsy : await results:   2: Pleural effusion : not much of chest xray  : but ct scan chest reveals more pleural effusion on rt side then left: s/p thoracentesis : sent for cultures:  has exudative effusion ? malignancy ? lymphoma : await flow cyto   3: HLD: : per PMD  '4: ANCA ASSOCIATED VASCULITIS : SHE HAD FEVER:  RHEUMATOLOGY FOLLOWING ON ANTIBIOTICS: AFEBRILE IN LAST 72 HOURS AND IS  off CEFEPIME: now  5: now with covid : on remdesivir:  : she is on  2 L of oxygen : but on record she did not desaturate too much  : she is on it for comfort:  demi acp : to remove oxygen and see her real o2 sao2: HER D DIMER NEEDS TO BE CHECKED    dw acp    11/23:      1: hx of lymphoma: DBLCL: S/P SURGERY RUL wedge resection and LN biopsy : await results:   2: Pleural effusion : not much of chest xray  : but ct scan chest reveals more pleural effusion on rt side then left: s/p thoracentesis : sent for cultures:  has exudative effusion ? malignancy ? lymphoma : await flow cyto   3: HLD: : per PMD  '4: ANCA ASSOCIATED VASCULITIS : SHE HAD FEVER:  RHEUMATOLOGY FOLLOWING ON ANTIBIOTICS: AFEBRILE IN LAST 72 HOURS AND IS  off CEFEPIME: now  5: now with covid : on remdesivir:  : she is on  2 L of oxygen : but on record she did not desaturate too much  : she is on it for comfort:  demi acp : to remove oxygen and see her real o2 sao2: HER D DIMER is high   ut she already had negative pe:     dw acp    11/24:    1: hx of lymphoma: DBLCL: S/P SURGERY RUL wedge resection and LN biopsy : await results:   2: Pleural effusion : not much of chest xray  : but ct scan chest reveals more pleural effusion on rt side then left: s/p thoracentesis : sent for cultures:  has exudative effusion ? malignancy ? lymphoma :   3: HLD: : per PMD  '4: ANCA ASSOCIATED VASCULITIS : SHE HAD FEVER:  RHEUMATOLOGY FOLLOWING ON ANTIBIOTICS: AFEBRILE IN LAST 72 HOURS AND IS  off CEFEPIME: now  5: now with covid : on remdesivir still   : she is on  2 L of oxygen : but on record she did not desaturate too much  : she is on it for comfort:  dw acp : to remove oxygen and see her real o2 sao2: HER D DIMER is high   but she already had negative pe: not on steroids per ID     dw acp    11/25:    1: hx of lymphoma: DBLCL: S/P SURGERY RUL wedge resection and LN biopsy : previous ebus biopsy was negative:  no granulomas reproted  2: Pleural effusion : not much of chest xray  : but ct scan chest reveals more pleural effusion on rt side then left: s/p thoracentesis : sent for cultures:  has exudative effusion ? malignancy ? lymphoma :   3: HLD: : per PMD  '4: ANCA ASSOCIATED VASCULITIS : SHE HAD FEVER:  RHEUMATOLOGY FOLLOWING ON ANTIBIOTICS: AFEBRILE IN LAST 72 HOURS AND IS  off CEFEPIME: now  5: now with covid : on remdesivir still   : she is on  2 L of oxygen : but on record she did not desaturate too much  : she is on it for comfort:  dw acp : to remove oxygen and see her real o2 sao2: HER D DIMER is high   but she already had negative pe: not on steroids per ID     dw acp    11/27:    1: hx of lymphoma: DBLCL: S/P SURGERY RUL wedge resection and LN biopsy : previous ebus biopsy was negative:  no granulomas reported  2: Pleural effusion : not much of chest xray  : but ct scan chest reveals more pleural effusion on rt side then left: s/p thoracentesis : sent for cultures:  has exudative effusion ? malignancy ? lymphoma :   3: HLD: : per PMD  '4: ANCA ASSOCIATED VASCULITIS : SHE HAD FEVER:  RHEUMATOLOGY FOLLOWING ON ANTIBIOTICS: AFEBRILE IN LAST 72 HOURS AND IS  off CEFEPIME: now  5: now with covid : on remdesivir still   :dexa started on for wheezing +    dw PMD    11/28:    1: hx of lymphoma: DBLCL: S/P SURGERY RUL wedge resection and LN biopsy : previous ebus biopsy was negative:  no granulomas reported  2: Pleural effusion : not much of chest xray  : but ct scan chest reveals more pleural effusion on rt side then left: s/p thoracentesis : sent for cultures:  has exudative effusion ? malignancy ? lymphoma :   3: HLD: : per PMD  '4: ANCA ASSOCIATED VASCULITIS : SHE HAD FEVER:  RHEUMATOLOGY FOLLOWING ON ANTIBIOTICS: AFEBRILE IN LAST 72 HOURS AND IS  off CEFEPIME: now :per rheum   5: now with covid : on remdesivir still   :and cont dexa for now : now not much of wheezing:     dw PMD    11/29:    1: Hx of lymphoma: DBLCL: S/P SURGERY RUL wedge resection and LN biopsy : previous ebus biopsy was negative:  no granulomas reported  2: Pleural effusion : not much of chest xray  : but ct scan chest reveals more pleural effusion on rt side then left: s/p thoracentesis : sent for cultures:  has exudative effusion ? malignancy ? lymphoma :   3: HLD: : per PMD  '4: ANCA ASSOCIATED VASCULITIS : SHE HAD FEVER:  RHEUMATOLOGY FOLLOWING ON ANTIBIOTICS: AFEBRILE IN LAST 72 HOURS AND IS  off CEFEPIME: now :per rheum : she is already on steroids:   5: now with covid : on remdesivir still   :and cont dexa for now  fr a totola of 10 days  : now not much of wheezing:     dw acp " she does feel better    11/30:      1: Hx of lymphoma: DBLCL: S/P SURGERY RUL wedge resection and LN biopsy : previous ebus biopsy was negative:  no granulomas reported  2: Pleural effusion : not much of chest xray  : but ct scan chest reveals more pleural effusion on rt side then left: s/p thoracentesis : sent for cultures:  has exudative effusion ? malignancy ? lymphoma :   3: HLD: : per PMD  '4: ANCA ASSOCIATED VASCULITIS : SHE HAD FEVER:  RHEUMATOLOGY FOLLOWING ON ANTIBIOTICS: AFEBRILE IN LAST 96 HOURS AND IS  off CEFEPIME: now :per rheum : she is already on steroids:   5: now with covid : on remdesivir still   :and cont dexa for now  fr a total of 10 days  : now not much of wheezing: : d dimer has decreased significantly     seems to be doing  ok : needs pt ot:  demi acp

## 2022-11-30 NOTE — PROGRESS NOTE ADULT - SUBJECTIVE AND OBJECTIVE BOX
[facility], Division of Infectious Diseases  ANDREW Rodríguez Y. Patel, S. Shah, G. Bebeto  [phone]  ([phone] - weekdays after 5pm and weekends)    Name: BETTIE PRATER  Age/Gender: 81y Female  MRN: [id number]    Interval History:  Patient seen this morning.   Notes reviewed.   No concerning overnight events.  Afebrile.   denies any complaints today  states she slept well and is regaining her appetitie    Allergies: codeine (Nausea)  doxycycline (Nausea)  penicillin (Hives)      Objective:  Vitals:   T(F): 97.4 (11-30-22 @ 05:33), Max: 97.9 (11-29-22 @ 18:19)  HR: 77 (11-30-22 @ 05:33) (43 - 91)  BP: 163/61 (11-30-22 @ 05:33) (129/67 - 163/61)  RR: 19 (11-30-22 @ 05:33) (18 - 20)  SpO2: 100% (11-30-22 @ 05:33) (79% - 100%)  Physical Examination:  General: no acute distress  HEENT: anicteric, NC  Cardio: normal rate  Resp: clear to auscultation anteriorly  Abd: soft, NT, ND  Ext: no LE edema  Skin: warm, dry    Laboratory Studies:  CBC:                       8.4    9.61  )-----------( 297      ( 30 Nov 2022 07:05 )             27.1     WBC Trend:  9.61 11-30-22 @ 07:05  10.72 11-29-22 @ 06:00  9.93 11-28-22 @ 06:09  5.50 11-27-22 @ 06:54  8.53 11-26-22 @ 06:56  8.88 11-25-22 @ 06:00  8.88 11-24-22 @ 02:28    CMP: 11-30    139  |  102  |  23  ----------------------------<  110<H>  3.7   |  27  |  0.46<L>    Ca    8.1<L>      30 Nov 2022 07:05  Phos  3.2     11-30  Mg     2.00     11-30    TPro  5.5<L>  /  Alb  2.7<L>  /  TBili  0.5  /  DBili  x   /  AST  15  /  ALT  6   /  AlkPhos  51  11-30      LIVER FUNCTIONS - ( 30 Nov 2022 07:05 )  Alb: 2.7 g/dL / Pro: 5.5 g/dL / ALK PHOS: 51 U/L / ALT: 6 U/L / AST: 15 U/L / GGT: x               Microbiology: reviewed     Culture - Blood (collected 11-18-22 @ 09:45)  Source: .Blood Blood-Peripheral  Final Report (11-23-22 @ 13:01):    No Growth Final    Culture - Blood (collected 11-18-22 @ 06:00)  Source: .Blood Blood-Peripheral  Final Report (11-23-22 @ 13:01):    No Growth Final    Culture - Body Fluid with Gram Stain (collected 11-17-22 @ 16:04)  Source: Pleural Fl Pleural Fluid  Gram Stain (11-17-22 @ 22:06):    polymorphonuclear leukocytes seen    No organisms seen    by cytocentrifuge  Final Report (11-22-22 @ 18:19):    No growth at 5 days        Radiology: reviewed     Medications:  acetaminophen     Tablet .. 650 milliGRAM(s) Oral every 6 hours PRN  acetaminophen   IVPB .. 750 milliGRAM(s) IV Intermittent once  albuterol    90 MICROgram(s) HFA Inhaler 2 Puff(s) Inhalation every 6 hours  benzonatate 200 milliGRAM(s) Oral three times a day PRN  budesonide 160 MICROgram(s)/formoterol 4.5 MICROgram(s) Inhaler 2 Puff(s) Inhalation two times a day  citalopram 10 milliGRAM(s) Oral daily  dexAMETHasone  Injectable 6 milliGRAM(s) IV Push daily  enoxaparin Injectable 40 milliGRAM(s) SubCutaneous every 24 hours  fluticasone propionate 50 MICROgram(s)/spray Nasal Spray 1 Spray(s) Both Nostrils two times a day  folic acid 1 milliGRAM(s) Oral daily  guaiFENesin  milliGRAM(s) Oral every 12 hours  ipratropium 17 MICROgram(s) HFA Inhaler 2 Puff(s) Inhalation every 6 hours  levalbuterol 45 MICROgram(s) HFA Inhaler 2 Puff(s) Inhalation every 6 hours  lidocaine   4% Patch 1 Patch Transdermal daily  melatonin 3 milliGRAM(s) Oral at bedtime  metoprolol tartrate 50 milliGRAM(s) Oral two times a day  naloxone Injectable 0.1 milliGRAM(s) IV Push every 3 minutes PRN  ondansetron Injectable 4 milliGRAM(s) IV Push every 6 hours PRN  pantoprazole    Tablet 40 milliGRAM(s) Oral before breakfast  polyethylene glycol 3350 17 Gram(s) Oral daily  senna 1 Tablet(s) Oral daily  sodium bicarbonate 650 milliGRAM(s) Oral three times a day    Antimicrobials:

## 2022-11-30 NOTE — PROGRESS NOTE ADULT - ASSESSMENT
80 y/o F PMhx diffuse large B cell lymphoma s/p R-CHOP, depression, GERD, PE/DVT (4/2021 no longer on Eliquis), ANCA-vasculitis (22 y/a, not on therapy), s/p LVATS, LUIS wedge resection (2021) who presented as for RVATS, RUL Nodule Biopsy    COVID  s/p remdesivir x 5 day course, completed 11/25  trend inflammatory markers  monitor SpO2, supplemental O2 as needed, wean as tolerated  c/w decadron x 10 days  supportive care  isolation per infection control policy   discharge planning    vasculitis  pathology concerning for vasculitis, special stains negative for microorganisms  rheumatology recs- transition to prednisone 20mg daily after decadron complete & f/u outpt  poor appetite will likely improve while on steroids    fever- resolved  quant gold negative  US LE neg for DVT  procal <.5  CTA chest- Moderate right and small left pleural effusions with some interlobular septal thickening suggestive of pulmonary edema. Small right pneumothorax.  s/p thoracentesis 11/17, exudative effusion, cultures- no growth  blood cultures- no growth final  pathology special stains negative for microorganisms  all cultures w/o growth, cefepime discontinued 11/22    DLBCL  s/p R VATS with RUL wedge resection and mediastinal LN dissection on 11/10/22  pathology- heterogenous population of lymphocytes; not consistent w/ recurrence  hem/onc following    penicillin allergy  reports reaction- welts as a child  tolerated ceftriaxone and cefepime    Gio Tate M.D.  [facility], Division of Infectious Diseases  [phone]  After 5pm on weekdays and all day on weekends - please call [phone]

## 2022-11-30 NOTE — PROGRESS NOTE ADULT - SUBJECTIVE AND OBJECTIVE BOX
Date of Service: 11-30-22 @ 17:39    Patient is a 81y old  Female who presents with a chief complaint of RVATS, RUL Nodule Biopsy w/ IR marking (30 Nov 2022 13:48)      Any change in ROS:  doing ok : no sob:     MEDICATIONS  (STANDING):  acetaminophen   IVPB .. 750 milliGRAM(s) IV Intermittent once  albuterol    90 MICROgram(s) HFA Inhaler 2 Puff(s) Inhalation every 6 hours  budesonide 160 MICROgram(s)/formoterol 4.5 MICROgram(s) Inhaler 2 Puff(s) Inhalation two times a day  citalopram 10 milliGRAM(s) Oral daily  dexAMETHasone  Injectable 6 milliGRAM(s) IV Push daily  enoxaparin Injectable 40 milliGRAM(s) SubCutaneous every 24 hours  fluticasone propionate 50 MICROgram(s)/spray Nasal Spray 1 Spray(s) Both Nostrils two times a day  folic acid 1 milliGRAM(s) Oral daily  guaiFENesin  milliGRAM(s) Oral every 12 hours  ipratropium 17 MICROgram(s) HFA Inhaler 2 Puff(s) Inhalation every 6 hours  levalbuterol 45 MICROgram(s) HFA Inhaler 2 Puff(s) Inhalation every 6 hours  lidocaine   4% Patch 1 Patch Transdermal daily  melatonin 3 milliGRAM(s) Oral at bedtime  metoprolol tartrate 50 milliGRAM(s) Oral two times a day  pantoprazole    Tablet 40 milliGRAM(s) Oral before breakfast  polyethylene glycol 3350 17 Gram(s) Oral daily  senna 1 Tablet(s) Oral daily  sodium bicarbonate 650 milliGRAM(s) Oral three times a day    MEDICATIONS  (PRN):  acetaminophen     Tablet .. 650 milliGRAM(s) Oral every 6 hours PRN Mild Pain (1 - 3)  benzonatate 200 milliGRAM(s) Oral three times a day PRN Cough  naloxone Injectable 0.1 milliGRAM(s) IV Push every 3 minutes PRN For ANY of the following changes in patient status:  A. RR LESS THAN 10 breaths per minute, B. Oxygen saturation LESS THAN 90%, C. Sedation score of 6  ondansetron Injectable 4 milliGRAM(s) IV Push every 6 hours PRN Nausea    Vital Signs Last 24 Hrs  T(C): 36.4 (30 Nov 2022 12:43), Max: 36.6 (29 Nov 2022 18:19)  T(F): 97.5 (30 Nov 2022 12:43), Max: 97.9 (29 Nov 2022 18:19)  HR: 66 (30 Nov 2022 12:43) (43 - 91)  BP: 122/62 (30 Nov 2022 13:00) (119/42 - 163/61)  BP(mean): --  RR: 18 (30 Nov 2022 14:35) (18 - 19)  SpO2: 92% (30 Nov 2022 14:35) (80% - 100%)    Parameters below as of 30 Nov 2022 14:35  Patient On (Oxygen Delivery Method): nasal cannula  O2 Flow (L/min): 1      I&O's Summary        Physical Exam:   GENERAL: NAD, well-groomed, well-developed  HEENT: RANJIT/   Atraumatic, Normocephalic  ENMT: No tonsillar erythema, exudates, or enlargement; Moist mucous membranes, Good dentition, No lesions  NECK: Supple, No JVD, Normal thyroid  CHEST/LUNG: Clear to auscultaion  CVS: Regular rate and rhythm; No murmurs, rubs, or gallops  GI: : Soft, Nontender, Nondistended; Bowel sounds present  NERVOUS SYSTEM:  Alert & Oriented X3  EXTREMITIES: - edema  LYMPH: No lymphadenopathy noted  SKIN: No rashes or lesions  ENDOCRINOLOGY: No Thyromegaly  PSYCH: Appropriate    Labs:  22                            8.4    9.61  )-----------( 297      ( 30 Nov 2022 07:05 )             27.1                         8.7    10.72 )-----------( 316      ( 29 Nov 2022 06:00 )             29.1                         8.8    9.93  )-----------( 288      ( 28 Nov 2022 06:09 )             28.9                         8.8    5.50  )-----------( 203      ( 27 Nov 2022 06:54 )             28.9     11-30    139  |  102  |  23  ----------------------------<  110<H>  3.7   |  27  |  0.46<L>  11-29    140  |  104  |  23  ----------------------------<  98  3.9   |  26  |  0.49<L>  11-28    140  |  105  |  23  ----------------------------<  137<H>  4.0   |  24  |  0.52  11-27    144  |  107  |  19  ----------------------------<  166<H>  4.2   |  25  |  0.48<L>    Ca    8.1<L>      30 Nov 2022 07:05  Ca    8.3<L>      29 Nov 2022 06:00  Phos  3.2     11-30  Phos  3.0     11-29  Mg     2.00     11-30  Mg     2.10     11-29    TPro  5.5<L>  /  Alb  2.7<L>  /  TBili  0.5  /  DBili  x   /  AST  15  /  ALT  6   /  AlkPhos  51  11-30  TPro  6.0  /  Alb  2.8<L>  /  TBili  0.5  /  DBili  x   /  AST  12  /  ALT  <5<L>  /  AlkPhos  55  11-29  TPro  6.0  /  Alb  2.6<L>  /  TBili  0.5  /  DBili  x   /  AST  8   /  ALT  <5  /  AlkPhos  57  11-28  TPro  5.8<L>  /  Alb  2.5<L>  /  TBili  0.6  /  DBili  x   /  AST  11  /  ALT  <5<L>  /  AlkPhos  55  11-27    CAPILLARY BLOOD GLUCOSE          LIVER FUNCTIONS - ( 30 Nov 2022 07:05 )  Alb: 2.7 g/dL / Pro: 5.5 g/dL / ALK PHOS: 51 U/L / ALT: 6 U/L / AST: 15 U/L / GGT: x               D-Dimer Assay, Quantitative: 1111 ng/mL DDU (11-29 @ 06:00)  D-Dimer Assay, Quantitative: 2028 ng/mL DDU (11-28 @ 06:09)  D-Dimer Assay, Quantitative: 1867 ng/mL DDU (11-27 @ 06:54)  D-Dimer Assay, Quantitative: 2792 ng/mL DDU (11-25 @ 06:00)  Procalcitonin, Serum: 0.12 ng/mL (11-28 @ 06:09)    rad< from: Xray Chest 1 View- PORTABLE-Routine (Xray Chest 1 View- PORTABLE-Routine .) (11.26.22 @ 18:48) >  COMPARISON STUDY: 11/22/2022    Frontal expiratory view of the chest shows the heart to be similar in   size. Left chest port is again noted.    The lungs show similar patchy infiltrates with small pleural effusions   and there is no evidence of pneumothorax.    IMPRESSION:  Similar patchy infiltrates.        Thank you for the courtesy of this referral.    --- End of Report ---    < end of copied text >      RECENT CULTURES:        RESPIRATORY CULTURES:          Studies  Chest X-RAY  CT SCAN Chest   Venous Dopplers: LE:   CT Abdomen  Others

## 2022-12-01 NOTE — PROGRESS NOTE ADULT - ASSESSMENT
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 11/29  -LASIX IV as needed    #covid   -tx per med/pulmo    #anemia   -prbc's per med    #R/o vasculitis   -w/u per primary team

## 2022-12-01 NOTE — PROGRESS NOTE ADULT - ASSESSMENT
80 y/o F PMhx diffuse large B cell lymphoma s/p R-CHOP, depression, GERD, PE/DVT (4/2021 no longer on Eliquis), ANCA-vasculitis (20 y/a, not on therapy), s/p LVATS, LUIS wedge resection (2021) who presented as for RVATS, RUL Nodule Biopsy    COVID  s/p remdesivir x 5 day course, completed 11/25  trend inflammatory markers  monitor SpO2, supplemental O2 as needed, wean as tolerated  c/w decadron x 10 days  supportive care  isolation per infection control policy   discharge planning    vasculitis  pathology concerning for vasculitis, special stains negative for microorganisms  rheumatology recs- transition to prednisone 20mg daily after decadron complete & f/u outpt  poor appetite will likely improve while on steroids    fever- resolved  quant gold negative  US LE neg for DVT  s/p thoracentesis 11/17, exudative effusion, cultures- no growth  blood cultures- no growth final  pathology special stains negative for microorganisms  all cultures w/o growth, cefepime discontinued 11/22    DLBCL  s/p R VATS with RUL wedge resection and mediastinal LN dissection on 11/10/22  pathology- heterogenous population of lymphocytes; not consistent w/ recurrence  hem/onc following    penicillin allergy  reports reaction- welts as a child  tolerated ceftriaxone and cefepime    Gio Tate M.D.  [facility], Division of Infectious Diseases  [phone]  After 5pm on weekdays and all day on weekends - please call [phone]

## 2022-12-01 NOTE — PROGRESS NOTE ADULT - ASSESSMENT
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 Patient states that recently she has been feeling very fatigued.    Fatigue/dyspnea, with hx of Lymphoma  - Recent TTE on 11/3/22 reviewed: normal LV. outpt card Dr. Ady Cooper  - PT also saw pulm Mack Tucker in the office, with some concern for her overall status. She was hypotensive to systolic 90s in the office.  (now BP improves s/p IVF here).   - s/p thoracoscopic biopsy of mediastinal lymph node and Lung wedge resection 11/10/22  - path results favoring vasculitis, but final stains to r/o infection are still pending; no evidence for lymphoma. Cytology also came back negative.   - 11/22/22 a pt felt more SOB early morning hours, placed on 2LNCO2, CXR  some moderate interstitial edema. Given IV Lasix 20 mg x 1. improved.   - comfortable on nasal canula, better post diuretic. wean O2 as tolerated   - appreciate rheum, heme-onc f/u  - no plan for immunosuppressants such as cellcept while being treated for COVID19    Wheezing on 11/26  - comfortable on nasal canula  - started Decadron 6 mg qdaily IV for covid. After completion of 10 days course, can be switched to Prednisone 20 mg per rheum. then f/u outpt at rheum clinic.   - repeat CXR stable.  - appreciate rheum and pulm   - s/p Lasix 20 mg x 1 as needed    Fever: either 2' ANCA-mediated vasculitis vs infection  - UA, CXR unimpressive. RVP neg.   - no leukocytosis, remain stable clinically  - procal low.  - LE venous dopplers (-) for DVT  - s/p rt thoracentesis 11/17/22  - cefepime started 11/18/22 following worsened leukocytosis 11/18/22   - blood cxs 11/18/22 --> (-)  - cefepime stopped 11/22/22  - ID appreciated  - After completion of 10 days course of decadron, can be switched to Prednisone 20 mg per rheum    (+) COVID-19 11/21/22  - Remdesivir 11/21/22-->11/23/22  - steroid initiation per rheumatology service and ID.    - monitoring inflammatory markers  - ferritin, CRP downtrending  - d-dimer uptrending, Hep SQ BID switch to Lovenox SQ    - now downtrending    Rt pleural effusion  - CTA chest 11/16/22 revealed moderate rt effusion  - s/p 650 cc U/S-guided rt thoracentesis 11/17/22  - exudative but no neutrophilic predominance and initial Gram stain w/o organisms  - cultures neg.   - ID/ pulmonology consult appreciated  - monitoring off abx     Tachycardia likely 2' fever  - cont low-dose metoprolol  - card consulted (Dr. Hooker) appreciated    Anemia, unspecified  - hgb 8.1 lower than her baseline.  - no melena, no hematochezia. no BRBPR.   - recent Anemia work-up reviewed: normal vit b12, folate.  - repeat iron studies ordered    - s/p 2 units pRBC 11/1/22 per heme's note.   - s/p 1 unit pRBC 11/9/22 with appropriate post transfusion hgb.    - no melena, no hematochezia. no BRBPR.   - 11/21/22 --> s/p another unit of pRBC  - Lasix 20 mg PRN     Anxiety and depression  - stable, continue citalopram.  - Pt denied SI/HI ideations, denied visual and auditory hallucinations.     GERD (gastroesophageal reflux disease)  - continue PPI    Hyperlipidemia.   - continue home ezetimibe. okay to hold if nonformulary   - Lipid panel    DVT ppx   - SC heparin --> Lovenox SQ    Disposition  - PT: rehab. Pt now agreeable to rehab

## 2022-12-01 NOTE — DISCHARGE NOTE PROVIDER - NSDCCPCAREPLAN_GEN_ALL_CORE_FT
PRINCIPAL DISCHARGE DIAGNOSIS  Diagnosis: Fatigue  Assessment and Plan of Treatment: hx of Lymphoma  Recent TTE on 11/3/22 with normal LV.      SECONDARY DISCHARGE DIAGNOSES  Diagnosis: Anxiety and depression  Assessment and Plan of Treatment: stable, continue citalopram.      Diagnosis: HLD (hyperlipidemia)  Assessment and Plan of Treatment: continue home ezetimibe.    Diagnosis: Wheezing  Assessment and Plan of Treatment: Wheezing on 11/26  You were started on steriods for covid.  Will receive decardron 6mg x 10 days then switch to Prednisone 20 mg per rheumatology    Diagnosis: Fever  Assessment and Plan of Treatment: You were seen by te infectious disease team and received IV antibiotics .    Diagnosis: 2019 novel coronavirus disease (COVID-19)  Assessment and Plan of Treatment: (+) COVID-19 11/21/22  - Remdesivir 11/21/22-->11/23/22  Continue steriods        Diagnosis: Pleural effusion  Assessment and Plan of Treatment: You were seen and evaluated by the Pulmonology team.  - Noted on CTA chest 11/16/22 revealed moderate rt effusion  - You underwent an ultrasound guided thoracentesis on  11/17/22 to remove collection      Diagnosis: Anemia  Assessment and Plan of Treatment: -recent Follow-up with your outpatient provider for further care/recommendations. Monitor for signs/symptoms indicating worsening of disease, such as, easy bleeding/bruising, pale skin, fatigue, dizziness, increased heart rate, or chest pain. work-up reviewed: normal .  You received blood transfusions during this admission    Lasix 20 mg PRN    Diagnosis: GERD (gastroesophageal reflux disease)  Assessment and Plan of Treatment: Continue PPI     PRINCIPAL DISCHARGE DIAGNOSIS  Diagnosis: Fatigue  Assessment and Plan of Treatment: hx of Lymphoma  Recent TTE on 11/3/22 with normal LV.      SECONDARY DISCHARGE DIAGNOSES  Diagnosis: Anxiety and depression  Assessment and Plan of Treatment: stable, continue citalopram.      Diagnosis: HLD (hyperlipidemia)  Assessment and Plan of Treatment: continue home ezetimibe.    Diagnosis: Wheezing  Assessment and Plan of Treatment: Wheezing on 11/26  You were started on steriods for covid.  Will receive decardron 6mg x 10 days then switch to Prednisone 20 mg per rheumatology    Diagnosis: Fever  Assessment and Plan of Treatment: You were seen by te infectious disease team and received IV antibiotics .    Diagnosis: 2019 novel coronavirus disease (COVID-19)  Assessment and Plan of Treatment: (+) COVID-19 11/21/22  - Remdesivir 11/21/22-->11/23/22  Continue steriods        Diagnosis: Pleural effusion  Assessment and Plan of Treatment: You were seen and evaluated by the Pulmonology team.  - Noted on CTA chest 11/16/22 revealed moderate rt effusion  - You underwent an ultrasound guided thoracentesis on  11/17/22 to remove collection      Diagnosis: Anemia  Assessment and Plan of Treatment: -recent Follow-up with your outpatient provider for further care/recommendations. Monitor for signs/symptoms indicating worsening of disease, such as, easy bleeding/bruising, pale skin, fatigue, dizziness, increased heart rate, or chest pain. work-up reviewed: normal .  You received blood transfusions during this admission    Lasix 20 mg PRN    Diagnosis: GERD (gastroesophageal reflux disease)  Assessment and Plan of Treatment: Continue PPI    Diagnosis: Vasculitis  Assessment and Plan of Treatment: F/U with her outpatient Rheum Dr. Kasandra Plaza (within 1-2 weeks of dc) to discuss further steroid sparing immunosuppressant therapy.     PRINCIPAL DISCHARGE DIAGNOSIS  Diagnosis: Fatigue  Assessment and Plan of Treatment: You have a hx of Lymphoma. Your recent echo on 11/3/22  was normal. Please follow up with your primary care physician.      SECONDARY DISCHARGE DIAGNOSES  Diagnosis: Anxiety and depression  Assessment and Plan of Treatment: Please continue your anxiety medication.       Diagnosis: HLD (hyperlipidemia)  Assessment and Plan of Treatment: continue home ezetimibe. Low fat diet, exercise daily and continue current medications. Follow up with primary care physician and cardiologist for management.    Diagnosis: Wheezing  Assessment and Plan of Treatment: Wheezing on 11/26  You were started on steriods for covid.  Will receive decadron 6mg PO per rheumatology    Diagnosis: Fever  Assessment and Plan of Treatment: You were seen by the infectious disease team and received IV antibiotics .    Diagnosis: 2019 novel coronavirus disease (COVID-19)  Assessment and Plan of Treatment: (+) COVID-19 11/21/22. You received Remdesivir 11/21/22-->11/23/22. Please continue steriods . Monitor for fevers, shortness of breath and cough primarily.  Monitor your temperature daily to not any changes and increases.  You should restrict activities outside your home, except for getting medical care. Call ahead before visiting your doctor. Wear a facemask. Cover your coughs and sneezes. Clean your hands often. Avoid sharing personal household items. Clean all “high-touch” surfaces everyday. Monitor your symptoms.       Diagnosis: Pleural effusion  Assessment and Plan of Treatment: You were seen and evaluated by the Pulmonology team.  - Noted on CTA chest 11/16/22 revealed moderate rt effusion  - You underwent an ultrasound guided thoracentesis on  11/17/22 to remove collection. Please follow up with pulmonologist and primary care physician.    Diagnosis: Anemia  Assessment and Plan of Treatment: You received blood transfusions during hospitalization. Continue to take current medications as prescribed.  Follow up your routine physician's appointments.  If you notice any bleeding, please notify your doctor immediately or go to the nearest emergency room.    Diagnosis: GERD (gastroesophageal reflux disease)  Assessment and Plan of Treatment: Avoid fatty, fried foods, acidic foods such as tomatoes, lime and chocolate. Continue to take your medications as prescribed, exercise daily and weight loss.    Diagnosis: Vasculitis  Assessment and Plan of Treatment: F/U with her outpatient Rheum Dr. Kasandra Plaza (within 1-2 weeks of dc) to discuss further steroid sparing immunosuppressant therapy.

## 2022-12-01 NOTE — DISCHARGE NOTE PROVIDER - CARE PROVIDER_API CALL
Kasandra Plaza  INTERNAL MEDICINE  [address] [facility], [address]  [place] [address]  Phone: [phone]  Fax: [phone]  Follow Up Time: 1 week    Ady Cooper (MD; PhD)  Cardiology; Internal Medicine; Vascular Medicine  [address] [place] [address], [address]  [place] [address]  Phone: [phone]  Fax: [phone]  Follow Up Time: 2 weeks   Kasandra Plaza  INTERNAL MEDICINE  [address] [facility], [address]  [place] [address]  Phone: [phone]  Fax: [phone]  Follow Up Time: 1 week    Ady Cooper; PhD)  Cardiology; Internal Medicine; Vascular Medicine  [address] [place] [address], [address]  [place] [address]  Phone: [phone]  Fax: [phone]  Follow Up Time: 2 weeks    Philip Penn)  Critical Care Medicine; Internal Medicine; Pulmonary Disease  [address] [place] [address]  Phone: [phone]  Fax: [phone]  Follow Up Time: 1 week

## 2022-12-01 NOTE — DISCHARGE NOTE PROVIDER - HOSPITAL COURSE
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 Patient states that recently she has been feeling very fatigued.    Fatigue/dyspnea, with hx of Lymphoma  - Recent TTE on 11/3/22 with normal LV. outpt card Dr. Ady Cooper  - PT also saw pulm Mack Tucker in the office, with some concern for her overall status. She was hypotensive to systolic 90s in the office.  (now BP improves s/p IVF here).   - s/p thoracoscopic biopsy of mediastinal lymph node and Lung wedge resection 11/10/22  - path results favoring vasculitis, but final stains to r/o infection are still pending; no evidence for lymphoma. Cytology also came back negative.   - 11/22/22 a pt felt more SOB early morning hours, placed on 2LNCO2, CXR  some moderate interstitial edema. Given IV Lasix 20 mg x 1. improved.   - comfortable on nasal canula, better post diuretic. wean O2 as tolerated   - appreciate rheum, heme-onc f/u  - no plan for immunosuppressants such as cellcept while being treated for COVID19    Wheezing on 11/26  - comfortable on nasal canula  - started on Decadron 6 mg qdaily IV for covid. After completion of 10 days course, can be switched to Prednisone 20 mg per rheum. then f/u outpt at rheum clinic.   - repeat CXR stable.  - s/p Lasix 20 mg x 1 as needed    Fever: either 2' ANCA-mediated vasculitis vs infection  - UA, CXR unimpressive. RVP neg.   - LE venous dopplers (-) for DVT  - s/p rt thoracentesis 11/17/22  - cefepime started 11/18/22 following worsened leukocytosis 11/18/22   - blood cxs 11/18/22 --> (-)  - cefepime stopped 11/22/22  - ID appreciated  - After completion of 10 days course of decadron, can be switched to Prednisone 20 mg per rheum    (+) COVID-19 11/21/22  - Remdesivir 11/21/22-->11/23/22  - steroid initiation per rheumatology service and ID.        Rt pleural effusion  - CTA chest 11/16/22 revealed moderate rt effusion  - s/p 650 cc U/S-guided rt thoracentesis 11/17/22  - exudative but no neutrophilic predominance and initial Gram stain w/o organisms  - cultures neg.   - ID/ pulmonology consult appreciated  - monitoring off abx     Tachycardia likely 2' fever  - cont low-dose metoprolol  - card consulted (Dr. Hooker) appreciated    Anemia, unspecified  - hgb 8.1 lower than her baseline.  - no melena, no hematochezia. no BRBPR.   - recent Anemia work-up reviewed: normal vit b12, folate.  - repeat iron studies ordered    - s/p 2 units pRBC 11/1/22 per heme's note.   - s/p 1 unit pRBC 11/9/22 with appropriate post transfusion hgb.    - no melena, no hematochezia. no BRBPR.   - 11/21/22 --> s/p another unit of pRBC  - Lasix 20 mg PRN     Anxiety and depression  - stable, continue citalopram.  - Pt denied SI/HI ideations, denied visual and auditory hallucinations.     GERD (gastroesophageal reflux disease)  - continue PPI    Hyperlipidemia.   - continue home ezetimibe.    Disposition  - PT: rehab. Pt now agreeable to rehab      Hospital course discussed with medical attending. Patient is medically stable for discharge to rehab Patient is 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), s/p LVATS, LUIS wedge resection (2021) direct admit for RVATS, RUL Nodule Biopsy w/ IR marking. Patient states that recently she has been feeling very fatigued.    Fatigue/dyspnea, with hx of Lymphoma  - Recent TTE on 11/3/22 with normal LV. outpt card Dr. Ady Coopre  - PT also saw pulm Mack Tucker in the office, with some concern for her overall status. She was hypotensive to systolic 90s in the office.  (now BP improves s/p IVF here).   - s/p thoracoscopic biopsy of mediastinal lymph node and Lung wedge resection 11/10/22  - path results favoring vasculitis, but final stains to r/o infection are still pending; no evidence for lymphoma. Cytology also came back negative.   - 11/22/22 a pt felt more SOB early morning hours, placed on 2LNCO2, CXR  some moderate interstitial edema. Given IV Lasix 20 mg x 1. improved.   - comfortable on nasal canula, better post diuretic. wean O2 as tolerated   - appreciate rheum, heme-onc f/u  - no plan for immunosuppressants such as cellcept while being treated for COVID19    Wheezing on 11/26  - comfortable on nasal canula  - started on Decadron 6 mg qdaily IV for covid. After completion of 10 days course, can be switched to Prednisone 20 mg per rheum. then f/u outpt at rheum clinic.   - CXR stable.  - s/p Lasix 20 mg x 1 as needed    Fever: either 2' ANCA-mediated vasculitis vs infection  - UA, CXR unimpressive. RVP neg.   - LE venous dopplers (-) for DVT  - s/p rt thoracentesis 11/17/22  - cefepime started 11/18/22 following worsened leukocytosis 11/18/22   - blood cxs 11/18/22 --> (-)  - cefepime stopped 11/22/22  - ID appreciated  - After completion of 10 days course of decadron, can be switched to Prednisone 20 mg per rheum    (+) COVID-19 11/21/22  - Remdesivir 11/21/22-->11/23/22  - steroid initiation per rheumatology service and ID.        Rt pleural effusion  - CTA chest 11/16/22 revealed moderate rt effusion  - s/p 650 cc U/S-guided rt thoracentesis 11/17/22  - exudative but no neutrophilic predominance and initial Gram stain w/o organisms  - cultures neg.   - ID/ pulmonology consult appreciated  - monitoring off abx     Tachycardia likely 2' fever  - cont low-dose metoprolol  - card consulted (Dr. Hooker) appreciated    Anemia, unspecified  - hgb 8.1 lower than her baseline.  - no melena, no hematochezia. no BRBPR.   - s/p 2 units pRBC 11/1/22 per heme's note.   - s/p 1 unit pRBC 11/9/22 with appropriate post transfusion hgb.    - no melena, no hematochezia. no BRBPR.   - 11/21/22 --> s/p another unit of pRBC  - Lasix 20 mg PRN     Anxiety and depression  - stable, continue citalopram.  - Pt denied SI/HI ideations, denied visual and auditory hallucinations.     GERD (gastroesophageal reflux disease)  - continue PPI    Hyperlipidemia.   - continue home ezetimibe.    Disposition  - PT: rehab. Pt now agreeable to rehab      Hospital course discussed with medical attending. Patient is medically stable for discharge to rehab Patient is 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), s/p LVATS, LUIS wedge resection (2021) direct admit for RVATS, RUL Nodule Biopsy w/ IR marking. Patient states that recently she has been feeling very fatigued.    Fatigue/dyspnea, with hx of Lymphoma  - Recent TTE on 11/3/22 with normal LV. outpt card Dr. Ady Cooper  - PT also saw pulm Mack Tucker in the office, with some concern for her overall status. She was hypotensive to systolic 90s in the office.  (now BP improves s/p IVF here).   - s/p thoracoscopic biopsy of mediastinal lymph node and Lung wedge resection 11/10/22  - path results favoring vasculitis, but final stains to r/o infection are still pending; no evidence for lymphoma. Cytology also came back negative.   - 11/22/22 a pt felt more SOB early morning hours, placed on 2LNCO2, CXR  some moderate interstitial edema. Given IV Lasix 20 mg x 1. improved.   - comfortable on nasal canula, better post diuretic. wean O2 as tolerated   - appreciate rheum, heme-onc f/u  - no plan for immunosuppressants such as cellcept while being treated for COVID19    Wheezing on 11/26  - comfortable on nasal canula  - started on Decadron 6 mg qdaily IV for covid. After completion of 10 days course, can be switched to Prednisone 20 mg per rheum. then f/u outpt at rheum clinic.   - CXR stable.  - s/p Lasix 20 mg x 1 as needed    Fever: either 2' ANCA-mediated vasculitis vs infection  - UA, CXR unimpressive. RVP neg.   - LE venous dopplers (-) for DVT  - s/p rt thoracentesis 11/17/22  - cefepime started 11/18/22 following worsened leukocytosis 11/18/22   - blood cxs 11/18/22 --> (-)  - cefepime stopped 11/22/22  - ID appreciated  - After completion of 10 days course of decadron, can be switched to Prednisone 20 mg per rheum     (+) COVID-19 11/21/22  - Remdesivir 11/21/22-->11/23/22  - steroid initiation per rheumatology service and ID.      Rt pleural effusion  - CTA chest 11/16/22 revealed moderate rt effusion  - s/p 650 cc U/S-guided rt thoracentesis 11/17/22  - exudative but no neutrophilic predominance and initial Gram stain w/o organisms  - cultures neg.   - ID/ pulmonology consult appreciated  - monitoring off abx     Tachycardia likely 2' fever  - cont low-dose metoprolol  - card consulted (Dr. Hooker) appreciated    Anemia, unspecified  - hgb 8.1 lower than her baseline.  - no melena, no hematochezia. no BRBPR.   - s/p 2 units pRBC 11/1/22 per heme's note.   - s/p 1 unit pRBC 11/9/22 with appropriate post transfusion hgb.    - no melena, no hematochezia. no BRBPR.   - 11/21/22 --> s/p another unit of pRBC  - Lasix 20 mg PRN     Anxiety and depression  - stable, continue citalopram.  - Pt denied SI/HI ideations, denied visual and auditory hallucinations.     GERD (gastroesophageal reflux disease)  - continue PPI    Hyperlipidemia.   - continue home ezetimibe.    On 12/2/22, case was discussed with , patient is medically cleared and optimized for discharge today. All medications were reviewed with attending. 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 Patient states that recently she has been feeling very fatigued.    Fatigue/dyspnea, with hx of Lymphoma  - Recent TTE on 11/3/22 with normal LV. outpt card Dr. Ady Cooper  - PT also saw pulm Mack Tucker in the office, with some concern for her overall status. She was hypotensive to systolic 90s in the office.  (now BP improves s/p IVF here).   - s/p thoracoscopic biopsy of mediastinal lymph node and Lung wedge resection 11/10/22  - path results favoring vasculitis, but final stains to r/o infection are still pending; no evidence for lymphoma. Cytology also came back negative.   - 11/22/22 a pt felt more SOB early morning hours, placed on 2LNCO2, CXR  some moderate interstitial edema. Given IV Lasix 20 mg x 1. improved.   - comfortable on nasal canula, better post diuretic. wean O2 as tolerated   - appreciate rheum, heme-onc f/u  - no plan for immunosuppressants such as cellcept while being treated for COVID19    Wheezing on 11/26  - comfortable on nasal canula  - s/p Decadron 6 mg qdaily IV changed to PO. After completion of 10 days course, can be switched to Prednisone 20 mg per rheum. then f/u outpt at rheum clinic.   - CXR stable.  - s/p Lasix 20 mg x 1 as needed    Fever: either 2' ANCA-mediated vasculitis vs infection  - UA, CXR unimpressive. RVP neg.   - LE venous dopplers (-) for DVT  - s/p rt thoracentesis 11/17/22  - cefepime started 11/18/22 following worsened leukocytosis 11/18/22   - blood cxs 11/18/22 --> (-)  - cefepime stopped 11/22/22  - ID appreciated  - After completion of 10 days course of decadron, can be switched to Prednisone 20 mg per rheum     (+) COVID-19 11/21/22  - Remdesivir 11/21/22-->11/23/22  - steroid initiation per rheumatology service and ID.      Rt pleural effusion  - CTA chest 11/16/22 revealed moderate rt effusion  - s/p 650 cc U/S-guided rt thoracentesis 11/17/22  - exudative but no neutrophilic predominance and initial Gram stain w/o organisms  - cultures neg.   - ID/ pulmonology consult appreciated  - monitoring off abx     Tachycardia likely 2' fever  - cont low-dose metoprolol  - card consulted (Dr. Hooker) appreciated    Anemia, unspecified  - hgb 8.1 lower than her baseline.  - no melena, no hematochezia. no BRBPR.   - s/p 2 units pRBC 11/1/22 per heme's note.   - s/p 1 unit pRBC 11/9/22 with appropriate post transfusion hgb.    - no melena, no hematochezia. no BRBPR.   - 11/21/22 --> s/p another unit of pRBC  - Lasix 20 mg PRN     Anxiety and depression  - stable, continue citalopram.  - Pt denied SI/HI ideations, denied visual and auditory hallucinations.     GERD (gastroesophageal reflux disease)  - continue PPI    Hyperlipidemia.   - continue home ezetimibe.    On 12/2/22, case was discussed with , patient is medically cleared and optimized for discharge today. All medications were reviewed with attending. 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 Patient states that recently she has been feeling very fatigued.    Fatigue/dyspnea, with hx of Lymphoma  - Recent TTE on 11/3/22 with normal LV. outpt card Dr. Ady Cooper  - PT also saw pulm Mack Tucker in the office, with some concern for her overall status. She was hypotensive to systolic 90s in the office.  (now BP improves s/p IVF here).   - s/p thoracoscopic biopsy of mediastinal lymph node and Lung wedge resection 11/10/22  - path results favoring vasculitis, but final stains to r/o infection are still pending; no evidence for lymphoma. Cytology also came back negative.   - 11/22/22 a pt felt more SOB early morning hours, placed on 2LNCO2, CXR  some moderate interstitial edema. Given IV Lasix 20 mg x 1. improved.   - comfortable on nasal canula, better post diuretic. wean O2 as tolerated   - appreciate rheum, heme-onc f/u  - no plan for immunosuppressants such as cellcept while being treated for COVID19    Wheezing on 11/26  - comfortable on nasal canula  - s/p Decadron 6 mg qdaily IV changed to PO. After completion of 10 days course, can be switched to Prednisone 20 mg per rheum. then f/u outpt at rheum clinic.   - CXR stable.  - s/p Lasix 20 mg x 1 as needed    Fever: either 2' ANCA-mediated vasculitis vs infection  - UA, CXR unimpressive. RVP neg.   - LE venous dopplers (-) for DVT  - s/p rt thoracentesis 11/17/22  - cefepime started 11/18/22 following worsened leukocytosis 11/18/22   - blood cxs 11/18/22 --> (-)  - cefepime stopped 11/22/22  - ID appreciated  - After completion of 10 days course of decadron, can be switched to Prednisone 20 mg per rheum     (+) COVID-19 11/21/22  - Remdesivir 11/21/22-->11/23/22  - steroid initiation per rheumatology service and ID.      Rt pleural effusion  - CTA chest 11/16/22 revealed moderate rt effusion  - s/p 650 cc U/S-guided rt thoracentesis 11/17/22  - exudative but no neutrophilic predominance and initial Gram stain w/o organisms  - cultures neg.   - ID/ pulmonology consult appreciated  - monitoring off abx     Tachycardia likely 2' fever  - cont low-dose metoprolol  - card consulted (Dr. Hooker) appreciated    Anemia, unspecified  - hgb 8.1 lower than her baseline.  - no melena, no hematochezia. no BRBPR.   - s/p 2 units pRBC 11/1/22 per heme's note.   - s/p 1 unit pRBC 11/9/22 with appropriate post transfusion hgb.    - no melena, no hematochezia. no BRBPR.   - 11/21/22 --> s/p another unit of pRBC  - Lasix 20 mg PRN     Anxiety and depression  - stable, continue citalopram.  - Pt denied SI/HI ideations, denied visual and auditory hallucinations.     GERD (gastroesophageal reflux disease)  - continue PPI    Hyperlipidemia.   - continue home ezetimibe.    On 12/4/22, case was discussed with , patient is medically cleared and optimized for discharge today. All medications were reviewed with attending.

## 2022-12-01 NOTE — PROGRESS NOTE ADULT - SUBJECTIVE AND OBJECTIVE BOX
98% on RA earlier this afternoon    Vital Signs Last 24 Hrs  T(C): 36.4 (01 Dec 2022 13:58), Max: 36.7 (30 Nov 2022 17:40)  T(F): 97.6 (01 Dec 2022 13:58), Max: 98 (30 Nov 2022 17:40)  HR: 67 (01 Dec 2022 13:58) (67 - 86)  BP: 141/51 (01 Dec 2022 13:58) (141/51 - 156/63)  BP(mean): --  RR: 18 (01 Dec 2022 13:58) (17 - 18)  SpO2: 98% (01 Dec 2022 13:58) (93% - 98%)    I&O's Summary      PHYSICAL EXAM:  GENERAL: NAD, thin-elderly, comfortable on nasal canula.   HEAD:  Atraumatic, Normocephalic  EYES: EOMI, PERRLA, conjunctiva and sclera clear  NECK: Supple, No JVD  CHEST/LUNG: mild decrease breath sounds bilaterally; + wheeze much improved.   HEART: Regular rate and rhythm; No murmurs, rubs, or gallops  ABDOMEN: Soft, Nontender, Nondistended; Bowel sounds present  Neuro: AAOx3, no focal weakness   EXTREMITIES:  2+ Peripheral Pulses, No clubbing, cyanosis, trace edema    LABS:                        8.6    8.32  )-----------( 265      ( 01 Dec 2022 05:45 )             27.6     12-01    137  |  101  |  19  ----------------------------<  98  4.0   |  27  |  0.45<L>    Ca    8.1<L>      01 Dec 2022 05:45  Phos  3.3     12-01  Mg     2.00     12-01    TPro  5.5<L>  /  Alb  2.8<L>  /  TBili  0.6  /  DBili  x   /  AST  14  /  ALT  8   /  AlkPhos  55  12-01      CAPILLARY BLOOD GLUCOSE                RADIOLOGY & ADDITIONAL TESTS:    Imaging Personally Reviewed:  [x] YES  [ ] NO    Will obtain old records:  [ ] YES  [x] NO

## 2022-12-01 NOTE — PROGRESS NOTE ADULT - SUBJECTIVE AND OBJECTIVE BOX
[facility] [place] [facility]   MD ROENL FRIAS MD KRISTINE SOLTANPOUR, DO ANGELA WONG, PA    TEL:  OFFICE: [phone]  From 5pm-7am Answering Service [phone]    -- RENAL FOLLOW UP NOTE ---Date of Service 12-01-22 @ 13:34    Patient is a 81y old  Female who presents with a chief complaint of RVATS, RUL Nodule Biopsy w/ IR marking (01 Dec 2022 12:57)      Patient seen and examined at bedside. No chest pain/sob    VITALS:  T(F): 97.3 (12-01-22 @ 07:45), Max: 98 (11-30-22 @ 17:40)  HR: 80 (12-01-22 @ 09:05)  BP: 156/63 (12-01-22 @ 07:45)  RR: 17 (12-01-22 @ 09:05)  SpO2: 95% (12-01-22 @ 09:05)  Wt(kg): --        PHYSICAL EXAM:  General: NAD  Neck: No JVD  Respiratory: CTAB, no wheezes, rales or rhonchi  Cardiovascular: S1, S2, RRR  Gastrointestinal: BS+, soft, NT/ND  Extremities: No peripheral edema    Hospital Medications:   MEDICATIONS  (STANDING):  acetaminophen   IVPB .. 750 milliGRAM(s) IV Intermittent once  albuterol    90 MICROgram(s) HFA Inhaler 2 Puff(s) Inhalation every 6 hours  budesonide 160 MICROgram(s)/formoterol 4.5 MICROgram(s) Inhaler 2 Puff(s) Inhalation two times a day  citalopram 10 milliGRAM(s) Oral daily  enoxaparin Injectable 40 milliGRAM(s) SubCutaneous every 24 hours  fluticasone propionate 50 MICROgram(s)/spray Nasal Spray 1 Spray(s) Both Nostrils two times a day  folic acid 1 milliGRAM(s) Oral daily  guaiFENesin  milliGRAM(s) Oral every 12 hours  ipratropium 17 MICROgram(s) HFA Inhaler 2 Puff(s) Inhalation every 6 hours  levalbuterol 45 MICROgram(s) HFA Inhaler 2 Puff(s) Inhalation every 6 hours  lidocaine   4% Patch 1 Patch Transdermal daily  melatonin 3 milliGRAM(s) Oral at bedtime  metoprolol tartrate 50 milliGRAM(s) Oral two times a day  pantoprazole    Tablet 40 milliGRAM(s) Oral before breakfast  polyethylene glycol 3350 17 Gram(s) Oral daily  senna 1 Tablet(s) Oral daily  sodium bicarbonate 650 milliGRAM(s) Oral three times a day      LABS:  12-01    137  |  101  |  19  ----------------------------<  98  4.0   |  27  |  0.45<L>    Ca    8.1<L>      01 Dec 2022 05:45  Phos  3.3     12-01  Mg     2.00     12-01    TPro  5.5<L>  /  Alb  2.8<L>  /  TBili  0.6  /  DBili      /  AST  14  /  ALT  8   /  AlkPhos  55  12-01    Creatinine Trend: 0.45 <--, 0.46 <--, 0.49 <--, 0.52 <--, 0.48 <--, 0.47 <--, 0.48 <--    Albumin, Serum: 2.8 g/dL (12-01 @ 05:45)  Phosphorus Level, Serum: 3.3 mg/dL (12-01 @ 05:45)                              8.6    8.32  )-----------( 265      ( 01 Dec 2022 05:45 )             27.6     Urine Studies:  Urinalysis - [11-17-22 @ 20:04]      Color Judy / Appearance Clear / SG 1.044 / pH 6.5      Gluc Negative / Ketone Small  / Bili Small / Urobili >12 mg/dL       Blood Small / Protein 100 mg/dL / Leuk Est Negative / Nitrite Negative      RBC 12 / WBC 11 / Hyaline 9 / Gran  / Sq Epi  / Non Sq Epi 11 / Bacteria Few      Iron 97, TIBC <127, %sat --      [11-10-22 @ 06:55]  Ferritin 1570      [11-25-22 @ 06:00]  Vitamin D (25OH) 38.3      [11-15-22 @ 06:47]  Lipid: chol 84, TG 81, HDL 22, LDL --      [10-01-22 @ 07:10]    HBsAb Nonreact      [11-11-22 @ 04:15]  HBsAg Nonreact      [11-11-22 @ 04:15]  HBcAb Nonreact      [11-11-22 @ 04:15]  HCV 0.10, Nonreact      [11-15-22 @ 06:47]    ANCA: cANCA Negative, pANCA Negative, atypical ANCA Indeterminate Method interference due to CATHERINE Fluorescence      [11-10-22 @ 06:55]  MPO-ANCA <5.0, interpretation: Negative      [11-10-22 @ 06:55]  PR3-ANCA <5.0, interpretation: Negative      [11-10-22 @ 06:55]    RADIOLOGY & ADDITIONAL STUDIES:

## 2022-12-01 NOTE — PROGRESS NOTE ADULT - SUBJECTIVE AND OBJECTIVE BOX
CARDIOLOGY FOLLOW UP - Dr. Hooker  DATE OF SERVICE: 12/1/22     CC no acute cv events       REVIEW OF SYSTEMS:  CONSTITUTIONAL: No fever, weight loss, or fatigue  RESPIRATORY: No cough, wheezing, chills or hemoptysis; No Shortness of Breath  CARDIOVASCULAR: No chest pain, palpitations, passing out, dizziness, or leg swelling  GASTROINTESTINAL: No abdominal or epigastric pain. No nausea, vomiting, or hematemesis; No diarrhea or constipation. No melena or hematochezia.  VASCULAR: No edema     PHYSICAL EXAM:  T(C): 36.3 (11-30-22 @ 21:47), Max: 36.7 (11-30-22 @ 17:40)  HR: 86 (12-01-22 @ 05:50) (66 - 86)  BP: 150/63 (12-01-22 @ 05:50) (119/42 - 155/56)  RR: 18 (11-30-22 @ 21:47) (18 - 19)  SpO2: 94% (11-30-22 @ 21:47) (80% - 100%)  Wt(kg): --  I&O's Summary      Appearance: Normal	  Cardiovascular: Normal S1 S2,RRR, No JVD, No murmurs  Respiratory: Lungs clear to auscultation	  Gastrointestinal:  Soft, Non-tender, + BS	  Extremities: Normal range of motion, No clubbing, cyanosis or edema      Home Medications:  citalopram 10 mg oral tablet: 1 tab(s) orally once a day (04 Nov 2022 15:16)  ezetimibe 10 mg oral tablet: 1 tab(s) orally once a day (04 Nov 2022 15:16)  folic acid 1 mg oral tablet: 1 tab(s) orally once a day (04 Nov 2022 15:16)  omeprazole 20 mg oral delayed release capsule: 1 cap(s) orally prn (04 Nov 2022 15:16)  Sodium Chloride 1 g oral tablet: daily (04 Nov 2022 15:16)      MEDICATIONS  (STANDING):  acetaminophen   IVPB .. 750 milliGRAM(s) IV Intermittent once  albuterol    90 MICROgram(s) HFA Inhaler 2 Puff(s) Inhalation every 6 hours  budesonide 160 MICROgram(s)/formoterol 4.5 MICROgram(s) Inhaler 2 Puff(s) Inhalation two times a day  citalopram 10 milliGRAM(s) Oral daily  dexAMETHasone  Injectable 6 milliGRAM(s) IV Push daily  enoxaparin Injectable 40 milliGRAM(s) SubCutaneous every 24 hours  fluticasone propionate 50 MICROgram(s)/spray Nasal Spray 1 Spray(s) Both Nostrils two times a day  folic acid 1 milliGRAM(s) Oral daily  guaiFENesin  milliGRAM(s) Oral every 12 hours  ipratropium 17 MICROgram(s) HFA Inhaler 2 Puff(s) Inhalation every 6 hours  levalbuterol 45 MICROgram(s) HFA Inhaler 2 Puff(s) Inhalation every 6 hours  lidocaine   4% Patch 1 Patch Transdermal daily  melatonin 3 milliGRAM(s) Oral at bedtime  metoprolol tartrate 50 milliGRAM(s) Oral two times a day  pantoprazole    Tablet 40 milliGRAM(s) Oral before breakfast  polyethylene glycol 3350 17 Gram(s) Oral daily  senna 1 Tablet(s) Oral daily  sodium bicarbonate 650 milliGRAM(s) Oral three times a day      TELEMETRY: 	nsr     ECG:  	  RADIOLOGY:   DIAGNOSTIC TESTING:  [ ] Echocardiogram:  [ ]  Catheterization:  [ ] Stress Test:    OTHER: 	    LABS:	 	                            8.6    8.32  )-----------( 265      ( 01 Dec 2022 05:45 )             27.6     12-01    137  |  101  |  19  ----------------------------<  98  4.0   |  27  |  0.45<L>    Ca    8.1<L>      01 Dec 2022 05:45  Phos  3.3     12-01  Mg     2.00     12-01    TPro  5.5<L>  /  Alb  2.8<L>  /  TBili  0.6  /  DBili  x   /  AST  14  /  ALT  8   /  AlkPhos  55  12-01

## 2022-12-01 NOTE — PROGRESS NOTE ADULT - ASSESSMENT
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 Patient is admitted to be optimized for her surgical procedure.    Plan: OR tomorrow for RVATS, RUL Nodule Biopsy w/ IR Marking.    1: hx of lymphoma: DBLCL  2: Pleural effusion :  3: HLD:   '4: ANCA ASSOCIATED VASCULITIS      11/17:    1: hx of lymphoma: DBLCL: S/P SURGERY RUL wedge resection and LN biopsy : await results:   2: Pleural effusion : not much of chest xray  : but ct scan chest revelas more pleurl effusion on rt sdie then left:  send for chem and cultures   3: HLD: : per PMD  '4: ANCA ASSOCIATED VASCULITIS : sHE HAD FEVER:  RHEUMATOLOGY FOLLOWING ON ANTIBIOTICS:    DW THORACIC     11/18:    1: hx of lymphoma: DBLCL: S/P SURGERY RUL wedge resection and LN biopsy : await results:   2: Pleural effusion : not much of chest xray  : but ct scan chest reveals more pleural effusion on rt side then left: s/p thoracentesis : sent for cultures:  has exudative effusion ? malignancy ? lymphoma  3: HLD: : per PMD  '4: ANCA ASSOCIATED VASCULITIS : ssHE HAD FEVER:  RHEUMATOLOGY FOLLOWING ON ANTIBIOTICS: AFEBRILE IN LAST 24 HOURS AND IS ON CEFEPIME:     DW THORACIC AND ACP     11/20:      1: hx of lymphoma: DBLCL: S/P SURGERY RUL wedge resection and LN biopsy : await results:   2: Pleural effusion : not much of chest xray  : but ct scan chest reveals more pleural effusion on rt side then left: s/p thoracentesis : sent for cultures:  has exudative effusion ? malignancy ? lymphoma : await flow cyto   3: HLD: : per PMD  '4: ANCA ASSOCIATED VASCULITIS : ssHE HAD FEVER:  RHEUMATOLOGY FOLLOWING ON ANTIBIOTICS: AFEBRILE IN LAST 72 HOURS AND IS ON CEFEPIME:     DW  ACP     11/21:      1: hx of lymphoma: DBLCL: S/P SURGERY RUL wedge resection and LN biopsy : await results:   2: Pleural effusion : not much of chest xray  : but ct scan chest reveals more pleural effusion on rt side then left: s/p thoracentesis : sent for cultures:  has exudative effusion ? malignancy ? lymphoma : await flow cyto   3: HLD: : per PMD  '4: ANCA ASSOCIATED VASCULITIS : SHE HAD FEVER:  RHEUMATOLOGY FOLLOWING ON ANTIBIOTICS: AFEBRILE IN LAST 72 HOURS AND IS still off CEFEPIME: now  5: now with covid : on remdesivir:  : she is on  2 L of oxygen : but on record she did not desaturate too much      DW  ACP     11/22    1: hx of lymphoma: DBLCL: S/P SURGERY RUL wedge resection and LN biopsy : await results:   2: Pleural effusion : not much of chest xray  : but ct scan chest reveals more pleural effusion on rt side then left: s/p thoracentesis : sent for cultures:  has exudative effusion ? malignancy ? lymphoma : await flow cyto   3: HLD: : per PMD  '4: ANCA ASSOCIATED VASCULITIS : SHE HAD FEVER:  RHEUMATOLOGY FOLLOWING ON ANTIBIOTICS: AFEBRILE IN LAST 72 HOURS AND IS  off CEFEPIME: now  5: now with covid : on remdesivir:  : she is on  2 L of oxygen : but on record she did not desaturate too much  : she is on it for comfort:  demi acp : to remove oxygen and see her real o2 sao2: HER D DIMER NEEDS TO BE CHECKED    dw acp    11/23:      1: hx of lymphoma: DBLCL: S/P SURGERY RUL wedge resection and LN biopsy : await results:   2: Pleural effusion : not much of chest xray  : but ct scan chest reveals more pleural effusion on rt side then left: s/p thoracentesis : sent for cultures:  has exudative effusion ? malignancy ? lymphoma : await flow cyto   3: HLD: : per PMD  '4: ANCA ASSOCIATED VASCULITIS : SHE HAD FEVER:  RHEUMATOLOGY FOLLOWING ON ANTIBIOTICS: AFEBRILE IN LAST 72 HOURS AND IS  off CEFEPIME: now  5: now with covid : on remdesivir:  : she is on  2 L of oxygen : but on record she did not desaturate too much  : she is on it for comfort:  demi acp : to remove oxygen and see her real o2 sao2: HER D DIMER is high   ut she already had negative pe:     dw acp    11/24:    1: hx of lymphoma: DBLCL: S/P SURGERY RUL wedge resection and LN biopsy : await results:   2: Pleural effusion : not much of chest xray  : but ct scan chest reveals more pleural effusion on rt side then left: s/p thoracentesis : sent for cultures:  has exudative effusion ? malignancy ? lymphoma :   3: HLD: : per PMD  '4: ANCA ASSOCIATED VASCULITIS : SHE HAD FEVER:  RHEUMATOLOGY FOLLOWING ON ANTIBIOTICS: AFEBRILE IN LAST 72 HOURS AND IS  off CEFEPIME: now  5: now with covid : on remdesivir still   : she is on  2 L of oxygen : but on record she did not desaturate too much  : she is on it for comfort:  dw acp : to remove oxygen and see her real o2 sao2: HER D DIMER is high   but she already had negative pe: not on steroids per ID     dw acp    11/25:    1: hx of lymphoma: DBLCL: S/P SURGERY RUL wedge resection and LN biopsy : previous ebus biopsy was negative:  no granulomas reproted  2: Pleural effusion : not much of chest xray  : but ct scan chest reveals more pleural effusion on rt side then left: s/p thoracentesis : sent for cultures:  has exudative effusion ? malignancy ? lymphoma :   3: HLD: : per PMD  '4: ANCA ASSOCIATED VASCULITIS : SHE HAD FEVER:  RHEUMATOLOGY FOLLOWING ON ANTIBIOTICS: AFEBRILE IN LAST 72 HOURS AND IS  off CEFEPIME: now  5: now with covid : on remdesivir still   : she is on  2 L of oxygen : but on record she did not desaturate too much  : she is on it for comfort:  dw acp : to remove oxygen and see her real o2 sao2: HER D DIMER is high   but she already had negative pe: not on steroids per ID     dw acp    11/27:    1: hx of lymphoma: DBLCL: S/P SURGERY RUL wedge resection and LN biopsy : previous ebus biopsy was negative:  no granulomas reported  2: Pleural effusion : not much of chest xray  : but ct scan chest reveals more pleural effusion on rt side then left: s/p thoracentesis : sent for cultures:  has exudative effusion ? malignancy ? lymphoma :   3: HLD: : per PMD  '4: ANCA ASSOCIATED VASCULITIS : SHE HAD FEVER:  RHEUMATOLOGY FOLLOWING ON ANTIBIOTICS: AFEBRILE IN LAST 72 HOURS AND IS  off CEFEPIME: now  5: now with covid : on remdesivir still   :dexa started on for wheezing +    dw PMD    11/28:    1: hx of lymphoma: DBLCL: S/P SURGERY RUL wedge resection and LN biopsy : previous ebus biopsy was negative:  no granulomas reported  2: Pleural effusion : not much of chest xray  : but ct scan chest reveals more pleural effusion on rt side then left: s/p thoracentesis : sent for cultures:  has exudative effusion ? malignancy ? lymphoma :   3: HLD: : per PMD  '4: ANCA ASSOCIATED VASCULITIS : SHE HAD FEVER:  RHEUMATOLOGY FOLLOWING ON ANTIBIOTICS: AFEBRILE IN LAST 72 HOURS AND IS  off CEFEPIME: now :per rheum   5: now with covid : on remdesivir still   :and cont dexa for now : now not much of wheezing:     dw PMD    11/29:    1: Hx of lymphoma: DBLCL: S/P SURGERY RUL wedge resection and LN biopsy : previous ebus biopsy was negative:  no granulomas reported  2: Pleural effusion : not much of chest xray  : but ct scan chest reveals more pleural effusion on rt side then left: s/p thoracentesis : sent for cultures:  has exudative effusion ? malignancy ? lymphoma :   3: HLD: : per PMD  '4: ANCA ASSOCIATED VASCULITIS : SHE HAD FEVER:  RHEUMATOLOGY FOLLOWING ON ANTIBIOTICS: AFEBRILE IN LAST 72 HOURS AND IS  off CEFEPIME: now :per rheum : she is already on steroids:   5: now with covid : on remdesivir still   :and cont dexa for now  fr a totola of 10 days  : now not much of wheezing:     dw acp " she does feel better    11/30:      1: Hx of lymphoma: DBLCL: S/P SURGERY RUL wedge resection and LN biopsy : previous ebus biopsy was negative:  no granulomas reported  2: Pleural effusion : not much of chest xray  : but ct scan chest reveals more pleural effusion on rt side then left: s/p thoracentesis : sent for cultures:  has exudative effusion ? malignancy ? lymphoma :   3: HLD: : per PMD  '4: ANCA ASSOCIATED VASCULITIS : SHE HAD FEVER:  RHEUMATOLOGY FOLLOWING ON ANTIBIOTICS: AFEBRILE IN LAST 96 HOURS AND IS  off CEFEPIME: now :per rheum : she is already on steroids:   5: now with covid : on remdesivir still   :and cont dexa for now  fr a total of 10 days  : now not much of wheezing: : d dimer has decreased significantly     seems to be doing  ok : needs pt ot:  demi santoyo      12/1:      1: Hx of lymphoma: DBLCL: S/P SURGERY RUL wedge resection and LN biopsy : previous ebus biopsy was negative:  no granulomas reported  2: Pleural effusion : not much of chest xray  : but ct scan chest reveals more pleural effusion on rt side then left: s/p thoracentesis : sent for cultures:  has exudative effusion ? malignancy ? lymphoma :   3: HLD: : per PMD  '4: ANCA ASSOCIATED VASCULITIS : SHE HAD FEVER:  RHEUMATOLOGY FOLLOWING ON ANTIBIOTICS: AFEBRILE IN LAST 96 HOURS AND IS  off CEFEPIME: now :per rheum : she is already on steroids:  and will eventually be transitioned to prednisone one dexa course is finished she had minmal ptx on previous ct after surgery   5: now with covid : on remdesivir still   :and cont dexa for now  fr a total of 10 days  : now not much of wheezing: : d dimer has decreased significantly ; but today d diemr has lsihty increased:  but clinically she looks better and is on room air infact today     seems to be doing  ok : needs pt ot:  demi santoyo

## 2022-12-01 NOTE — PROGRESS NOTE ADULT - SUBJECTIVE AND OBJECTIVE BOX
[facility], Division of Infectious Diseases  ANDREW Rodríguez Y. Patel, S. Shah, G. Bebeto  [phone]  ([phone] - weekdays after 5pm and weekends)    Name: BETTIE PRATER  Age/Gender: 81y Female  MRN: [id number]    Interval History:  Patient seen this morning.   Notes reviewed.   No concerning overnight events.  Afebrile.   states she has been sleeping well  reports appetite is still not great but has improved    Allergies: codeine (Nausea)  doxycycline (Nausea)  penicillin (Hives)      Objective:  Vitals:   T(F): 97.3 (12-01-22 @ 07:45), Max: 98 (11-30-22 @ 17:40)  HR: 80 (12-01-22 @ 09:05) (66 - 86)  BP: 156/63 (12-01-22 @ 07:45) (119/42 - 156/63)  RR: 17 (12-01-22 @ 09:05) (17 - 19)  SpO2: 95% (12-01-22 @ 09:05) (80% - 96%)  Physical Examination:  General: no acute distress  HEENT: anicteric  Cardio: normal rate  Resp: mild wheezes b/l anteriorly  Abd: soft, NT, ND  Ext: no LE edema  Skin: warm, dry    Laboratory Studies:  CBC:                       8.6    8.32  )-----------( 265      ( 01 Dec 2022 05:45 )             27.6     WBC Trend:  8.32 12-01-22 @ 05:45  9.61 11-30-22 @ 07:05  10.72 11-29-22 @ 06:00  9.93 11-28-22 @ 06:09  5.50 11-27-22 @ 06:54  8.53 11-26-22 @ 06:56  8.88 11-25-22 @ 06:00    CMP: 12-01    137  |  101  |  19  ----------------------------<  98  4.0   |  27  |  0.45<L>    Ca    8.1<L>      01 Dec 2022 05:45  Phos  3.3     12-01  Mg     2.00     12-01    TPro  5.5<L>  /  Alb  2.8<L>  /  TBili  0.6  /  DBili  x   /  AST  14  /  ALT  8   /  AlkPhos  55  12-01      LIVER FUNCTIONS - ( 01 Dec 2022 05:45 )  Alb: 2.8 g/dL / Pro: 5.5 g/dL / ALK PHOS: 55 U/L / ALT: 8 U/L / AST: 14 U/L / GGT: x               Microbiology: reviewed     Culture - Blood (collected 11-18-22 @ 09:45)  Source: .Blood Blood-Peripheral  Final Report (11-23-22 @ 13:01):    No Growth Final    Culture - Blood (collected 11-18-22 @ 06:00)  Source: .Blood Blood-Peripheral  Final Report (11-23-22 @ 13:01):    No Growth Final    Culture - Body Fluid with Gram Stain (collected 11-17-22 @ 16:04)  Source: Pleural Fl Pleural Fluid  Gram Stain (11-17-22 @ 22:06):    polymorphonuclear leukocytes seen    No organisms seen    by cytocentrifuge  Final Report (11-22-22 @ 18:19):    No growth at 5 days        Radiology: reviewed     Medications:  acetaminophen     Tablet .. 650 milliGRAM(s) Oral every 6 hours PRN  acetaminophen   IVPB .. 750 milliGRAM(s) IV Intermittent once  albuterol    90 MICROgram(s) HFA Inhaler 2 Puff(s) Inhalation every 6 hours  benzonatate 200 milliGRAM(s) Oral three times a day PRN  budesonide 160 MICROgram(s)/formoterol 4.5 MICROgram(s) Inhaler 2 Puff(s) Inhalation two times a day  citalopram 10 milliGRAM(s) Oral daily  dexAMETHasone  Injectable 6 milliGRAM(s) IV Push daily  enoxaparin Injectable 40 milliGRAM(s) SubCutaneous every 24 hours  fluticasone propionate 50 MICROgram(s)/spray Nasal Spray 1 Spray(s) Both Nostrils two times a day  folic acid 1 milliGRAM(s) Oral daily  guaiFENesin  milliGRAM(s) Oral every 12 hours  ipratropium 17 MICROgram(s) HFA Inhaler 2 Puff(s) Inhalation every 6 hours  levalbuterol 45 MICROgram(s) HFA Inhaler 2 Puff(s) Inhalation every 6 hours  lidocaine   4% Patch 1 Patch Transdermal daily  melatonin 3 milliGRAM(s) Oral at bedtime  metoprolol tartrate 50 milliGRAM(s) Oral two times a day  naloxone Injectable 0.1 milliGRAM(s) IV Push every 3 minutes PRN  ondansetron Injectable 4 milliGRAM(s) IV Push every 6 hours PRN  pantoprazole    Tablet 40 milliGRAM(s) Oral before breakfast  polyethylene glycol 3350 17 Gram(s) Oral daily  senna 1 Tablet(s) Oral daily  sodium bicarbonate 650 milliGRAM(s) Oral three times a day    Antimicrobials:

## 2022-12-01 NOTE — DISCHARGE NOTE PROVIDER - NSDCFUADDAPPT_GEN_ALL_CORE_FT
follow up with outpatient Rheumatologist Dr. Kasandra Plaza (within 1-2 weeks of discharge) to discuss further steroid sparing immunosuppressant therapy.

## 2022-12-01 NOTE — DISCHARGE NOTE PROVIDER - DETAILS OF MALNUTRITION DIAGNOSIS/DIAGNOSES
This patient has been assessed with a concern for Malnutrition and was treated during this hospitalization for the following Nutrition diagnosis/diagnoses:     -  11/21/2022: Severe protein-calorie malnutrition

## 2022-12-01 NOTE — DISCHARGE NOTE PROVIDER - NSDCMRMEDTOKEN_GEN_ALL_CORE_FT
acetaminophen 325 mg oral tablet: 2 tab(s) orally every 6 hours, As needed, Mild Pain (1 - 3)  citalopram 10 mg oral tablet: 1 tab(s) orally once a day  dexamethasone 6 mg oral tablet: 1 tab(s) orally once a day  ezetimibe 10 mg oral tablet: 1 tab(s) orally once a day  fluticasone 50 mcg/inh nasal spray: 1 spray(s) nasal 2 times a day  folic acid 1 mg oral tablet: 1 tab(s) orally once a day  ipratropium CFC free 17 mcg/inh inhalation aerosol: 2 puff(s) inhaled every 6 hours  levalbuterol 45 mcg/inh inhalation aerosol: 2 puff(s) inhaled every 6 hours  melatonin 3 mg oral tablet: 1 tab(s) orally once a day (at bedtime)  metoprolol tartrate 50 mg oral tablet: 1 tab(s) orally 2 times a day  omeprazole 20 mg oral delayed release capsule: 1 cap(s) orally prn  Sodium Chloride 1 g oral tablet: daily   acetaminophen 325 mg oral tablet: 2 tab(s) orally every 6 hours, As needed, Mild Pain (1 - 3)  albuterol 90 mcg/inh inhalation aerosol: 2 puff(s) inhaled every 6 hours  budesonide-formoterol 160 mcg-4.5 mcg/inh inhalation aerosol: 2 puff(s) inhaled 2 times a day  citalopram 10 mg oral tablet: 1 tab(s) orally once a day  dexamethasone 6 mg oral tablet: 1 tab(s) orally once a day  enoxaparin: 40 milligram(s) subcutaneous once a day  ezetimibe 10 mg oral tablet: 1 tab(s) orally once a day  fluticasone 50 mcg/inh nasal spray: 1 spray(s) nasal 2 times a day  folic acid 1 mg oral tablet: 1 tab(s) orally once a day  guaiFENesin 600 mg oral tablet, extended release: 1 tab(s) orally every 12 hours  ipratropium CFC free 17 mcg/inh inhalation aerosol: 2 puff(s) inhaled every 6 hours  levalbuterol 45 mcg/inh inhalation aerosol: 2 puff(s) inhaled every 6 hours  melatonin 3 mg oral tablet: 1 tab(s) orally once a day (at bedtime)  metoprolol tartrate 50 mg oral tablet: 1 tab(s) orally 2 times a day  omeprazole 20 mg oral delayed release capsule: 1 cap(s) orally prn  polyethylene glycol 3350 oral powder for reconstitution: 17 gram(s) orally once a day  senna leaf extract oral tablet: 1 tab(s) orally once a day  sodium bicarbonate 650 mg oral tablet: 1 tab(s) orally 3 times a day   acetaminophen 325 mg oral tablet: 2 tab(s) orally every 6 hours, As needed, Mild Pain (1 - 3)  albuterol 90 mcg/inh inhalation aerosol: 2 puff(s) inhaled every 6 hours  budesonide-formoterol 160 mcg-4.5 mcg/inh inhalation aerosol: 2 puff(s) inhaled 2 times a day  citalopram 10 mg oral tablet: 1 tab(s) orally once a day  dexamethasone 6 mg oral tablet: 1 tab(s) orally once a day   Last dose 12/5  enoxaparin: 40 milligram(s) subcutaneous once a day  ezetimibe 10 mg oral tablet: 1 tab(s) orally once a day  fluticasone 50 mcg/inh nasal spray: 1 spray(s) nasal 2 times a day  folic acid 1 mg oral tablet: 1 tab(s) orally once a day  guaiFENesin 600 mg oral tablet, extended release: 1 tab(s) orally every 12 hours  ipratropium CFC free 17 mcg/inh inhalation aerosol: 2 puff(s) inhaled every 6 hours  levalbuterol 45 mcg/inh inhalation aerosol: 2 puff(s) inhaled every 6 hours  melatonin 3 mg oral tablet: 1 tab(s) orally once a day (at bedtime)  metoprolol tartrate 50 mg oral tablet: 1 tab(s) orally 2 times a day  omeprazole 20 mg oral delayed release capsule: 1 cap(s) orally prn  polyethylene glycol 3350 oral powder for reconstitution: 17 gram(s) orally once a day  predniSONE 20 mg oral tablet: 1 tab(s) orally once a day  Start on 12/16  senna leaf extract oral tablet: 1 tab(s) orally once a day  sodium bicarbonate 650 mg oral tablet: 1 tab(s) orally 3 times a day

## 2022-12-01 NOTE — DISCHARGE NOTE PROVIDER - CARE PROVIDERS DIRECT ADDRESSES
Symptoms are stable, patient will continue current medication as prescribed   ,DirectAddress_Unknown,[email or web address] ,DirectAddress_Unknown,[email or web address],[email or web address]

## 2022-12-01 NOTE — PROGRESS NOTE ADULT - SUBJECTIVE AND OBJECTIVE BOX
Date of Service: 12-01-22 @ 15:20    Patient is a 81y old  Female who presents with a chief complaint of RVATS, RUL Nodule Biopsy w/ IR marking (01 Dec 2022 13:34)      Any change in ROS: seems OK : no sob:  no cough: on room air now:       MEDICATIONS  (STANDING):  acetaminophen   IVPB .. 750 milliGRAM(s) IV Intermittent once  albuterol    90 MICROgram(s) HFA Inhaler 2 Puff(s) Inhalation every 6 hours  budesonide 160 MICROgram(s)/formoterol 4.5 MICROgram(s) Inhaler 2 Puff(s) Inhalation two times a day  citalopram 10 milliGRAM(s) Oral daily  enoxaparin Injectable 40 milliGRAM(s) SubCutaneous every 24 hours  fluticasone propionate 50 MICROgram(s)/spray Nasal Spray 1 Spray(s) Both Nostrils two times a day  folic acid 1 milliGRAM(s) Oral daily  guaiFENesin  milliGRAM(s) Oral every 12 hours  ipratropium 17 MICROgram(s) HFA Inhaler 2 Puff(s) Inhalation every 6 hours  levalbuterol 45 MICROgram(s) HFA Inhaler 2 Puff(s) Inhalation every 6 hours  lidocaine   4% Patch 1 Patch Transdermal daily  melatonin 3 milliGRAM(s) Oral at bedtime  metoprolol tartrate 50 milliGRAM(s) Oral two times a day  pantoprazole    Tablet 40 milliGRAM(s) Oral before breakfast  polyethylene glycol 3350 17 Gram(s) Oral daily  senna 1 Tablet(s) Oral daily  sodium bicarbonate 650 milliGRAM(s) Oral three times a day    MEDICATIONS  (PRN):  acetaminophen     Tablet .. 650 milliGRAM(s) Oral every 6 hours PRN Mild Pain (1 - 3)  benzonatate 200 milliGRAM(s) Oral three times a day PRN Cough  naloxone Injectable 0.1 milliGRAM(s) IV Push every 3 minutes PRN For ANY of the following changes in patient status:  A. RR LESS THAN 10 breaths per minute, B. Oxygen saturation LESS THAN 90%, C. Sedation score of 6  ondansetron Injectable 4 milliGRAM(s) IV Push every 6 hours PRN Nausea    Vital Signs Last 24 Hrs  T(C): 36.4 (01 Dec 2022 13:58), Max: 36.7 (30 Nov 2022 17:40)  T(F): 97.6 (01 Dec 2022 13:58), Max: 98 (30 Nov 2022 17:40)  HR: 67 (01 Dec 2022 13:58) (67 - 86)  BP: 141/51 (01 Dec 2022 13:58) (141/51 - 156/63)  BP(mean): --  RR: 18 (01 Dec 2022 13:58) (17 - 18)  SpO2: 98% (01 Dec 2022 13:58) (93% - 98%)    Parameters below as of 01 Dec 2022 13:58  Patient On (Oxygen Delivery Method): room air        I&O's Summary        Physical Exam:   GENERAL: NAD, well-groomed, well-developed  HEENT: RANJIT/   Atraumatic, Normocephalic  ENMT: No tonsillar erythema, exudates, or enlargement; Moist mucous membranes, Good dentition, No lesions  NECK: Supple, No JVD, Normal thyroid  CHEST/LUNG: Clear to auscultaion  CVS: Regular rate and rhythm; No murmurs, rubs, or gallops  GI: : Soft, Nontender, Nondistended; Bowel sounds present  NERVOUS SYSTEM:  Alert & Oriented X3  EXTREMITIES:  2+ Peripheral Pulses, No clubbing, cyanosis, or edema  LYMPH: No lymphadenopathy noted  SKIN: No rashes or lesions  ENDOCRINOLOGY: No Thyromegaly  PSYCH: Appropriate    Labs:                              8.6    8.32  )-----------( 265      ( 01 Dec 2022 05:45 )             27.6                         8.4    9.61  )-----------( 297      ( 30 Nov 2022 07:05 )             27.1                         8.7    10.72 )-----------( 316      ( 29 Nov 2022 06:00 )             29.1                         8.8    9.93  )-----------( 288      ( 28 Nov 2022 06:09 )             28.9     12-01    137  |  101  |  19  ----------------------------<  98  4.0   |  27  |  0.45<L>  11-30    139  |  102  |  23  ----------------------------<  110<H>  3.7   |  27  |  0.46<L>  11-29    140  |  104  |  23  ----------------------------<  98  3.9   |  26  |  0.49<L>  11-28    140  |  105  |  23  ----------------------------<  137<H>  4.0   |  24  |  0.52    Ca    8.1<L>      01 Dec 2022 05:45  Ca    8.1<L>      30 Nov 2022 07:05  Phos  3.3     12-01  Phos  3.2     11-30  Mg     2.00     12-01  Mg     2.00     11-30    TPro  5.5<L>  /  Alb  2.8<L>  /  TBili  0.6  /  DBili  x   /  AST  14  /  ALT  8   /  AlkPhos  55  12-01  TPro  5.5<L>  /  Alb  2.7<L>  /  TBili  0.5  /  DBili  x   /  AST  15  /  ALT  6   /  AlkPhos  51  11-30  TPro  6.0  /  Alb  2.8<L>  /  TBili  0.5  /  DBili  x   /  AST  12  /  ALT  <5<L>  /  AlkPhos  55  11-29  TPro  6.0  /  Alb  2.6<L>  /  TBili  0.5  /  DBili  x   /  AST  8   /  ALT  <5  /  AlkPhos  57  11-28    CAPILLARY BLOOD GLUCOSE          LIVER FUNCTIONS - ( 01 Dec 2022 05:45 )  Alb: 2.8 g/dL / Pro: 5.5 g/dL / ALK PHOS: 55 U/L / ALT: 8 U/L / AST: 14 U/L / GGT: x               D-Dimer Assay, Quantitative: 1291 ng/mL DDU (12-01 @ 05:45)  D-Dimer Assay, Quantitative: 1111 ng/mL DDU (11-29 @ 06:00)  D-Dimer Assay, Quantitative: 2028 ng/mL DDU (11-28 @ 06:09)  D-Dimer Assay, Quantitative: 1867 ng/mL DDU (11-27 @ 06:54)  D-Dimer Assay, Quantitative: 2792 ng/mL DDU (11-25 @ 06:00)  Procalcitonin, Serum: 0.12 ng/mL (11-28 @ 06:09)        RECENT CULTURES:    rad< from: Xray Chest 1 View- PORTABLE-Routine (Xray Chest 1 View- PORTABLE-Routine .) (11.26.22 @ 18:48) >  ACC: [id number] EXAM:  XR CHEST PORTABLE ROUTINE 1V                          PROCEDURE DATE:  11/26/2022          INTERPRETATION:  Chest one view    HISTORY: Covid-19    COMPARISON STUDY: 11/22/2022    Frontal expiratory view of the chest shows the heart to be similar in   size. Left chest port is again noted.    The lungs show similar patchy infiltrates with small pleural effusions   and there is no evidence of pneumothorax.    IMPRESSION:  Similar patchy infiltrates.        Thank you for the courtesy of this referral.    --- End of Report ---            ALEXA ZABALA MD; Attending Interventional Radiologist  This document has been electronically signed. Nov 27 2022  3:59PM    < end of copied text >      RESPIRATORY CULTURES:          Studies  Chest X-RAY  CT SCAN Chest   Venous Dopplers: LE:   CT Abdomen  Others

## 2022-12-01 NOTE — DISCHARGE NOTE PROVIDER - PROVIDER TOKENS
PROVIDER:[TOKEN:[[id number]],FOLLOWUP:[1 week]],PROVIDER:[TOKEN:[[id number]],FOLLOWUP:[2 weeks]] PROVIDER:[TOKEN:[[id number]],FOLLOWUP:[1 week]],PROVIDER:[TOKEN:[[id number]],FOLLOWUP:[2 weeks]],PROVIDER:[TOKEN:[[id number]],FOLLOWUP:[1 week]]

## 2022-12-02 NOTE — PROGRESS NOTE ADULT - SUBJECTIVE AND OBJECTIVE BOX
CARDIOLOGY FOLLOW UP - Dr. Hooker  DATE OF SERVICE: 12/2/22    CC no acute cv events       REVIEW OF SYSTEMS:  CONSTITUTIONAL: No fever, weight loss, or fatigue  RESPIRATORY: No cough, wheezing, chills or hemoptysis; No Shortness of Breath  CARDIOVASCULAR: No chest pain, palpitations, passing out, dizziness, or leg swelling  GASTROINTESTINAL: No abdominal or epigastric pain. No nausea, vomiting, or hematemesis; No diarrhea or constipation. No melena or hematochezia.  VASCULAR: No edema     PHYSICAL EXAM:  T(C): 36.2 (12-02-22 @ 08:05), Max: 36.6 (12-02-22 @ 05:20)  HR: 67 (12-02-22 @ 08:05) (67 - 77)  BP: 153/80 (12-02-22 @ 08:05) (136/60 - 156/64)  RR: 17 (12-02-22 @ 08:05) (17 - 18)  SpO2: 99% (12-02-22 @ 08:05) (99% - 100%)  Wt(kg): --  I&O's Summary      Appearance: Normal	  Cardiovascular: Normal S1 S2,RRR, No JVD, No murmurs  Respiratory: Lungs clear to auscultation	  Gastrointestinal:  Soft, Non-tender, + BS	  Extremities: Normal range of motion, No clubbing, cyanosis or edema      Home Medications:  acetaminophen 325 mg oral tablet: 2 tab(s) orally every 6 hours, As needed, Mild Pain (1 - 3) (01 Dec 2022 13:22)  albuterol 90 mcg/inh inhalation aerosol: 2 puff(s) inhaled every 6 hours (02 Dec 2022 11:18)  budesonide-formoterol 160 mcg-4.5 mcg/inh inhalation aerosol: 2 puff(s) inhaled 2 times a day (02 Dec 2022 11:18)  citalopram 10 mg oral tablet: 1 tab(s) orally once a day (04 Nov 2022 15:16)  dexamethasone 6 mg oral tablet: 1 tab(s) orally once a day (02 Dec 2022 11:14)  enoxaparin: 40 milligram(s) subcutaneous once a day (02 Dec 2022 11:25)  ezetimibe 10 mg oral tablet: 1 tab(s) orally once a day (04 Nov 2022 15:16)  folic acid 1 mg oral tablet: 1 tab(s) orally once a day (04 Nov 2022 15:16)  guaiFENesin 600 mg oral tablet, extended release: 1 tab(s) orally every 12 hours (02 Dec 2022 11:18)  ipratropium CFC free 17 mcg/inh inhalation aerosol: 2 puff(s) inhaled every 6 hours (01 Dec 2022 13:22)  levalbuterol 45 mcg/inh inhalation aerosol: 2 puff(s) inhaled every 6 hours (01 Dec 2022 13:22)  melatonin 3 mg oral tablet: 1 tab(s) orally once a day (at bedtime) (01 Dec 2022 13:22)  metoprolol tartrate 50 mg oral tablet: 1 tab(s) orally 2 times a day (01 Dec 2022 13:22)  omeprazole 20 mg oral delayed release capsule: 1 cap(s) orally prn (04 Nov 2022 15:16)  polyethylene glycol 3350 oral powder for reconstitution: 17 gram(s) orally once a day (02 Dec 2022 11:18)  senna leaf extract oral tablet: 1 tab(s) orally once a day (02 Dec 2022 11:18)  sodium bicarbonate 650 mg oral tablet: 1 tab(s) orally 3 times a day (02 Dec 2022 11:18)      MEDICATIONS  (STANDING):  acetaminophen   IVPB .. 750 milliGRAM(s) IV Intermittent once  albuterol    90 MICROgram(s) HFA Inhaler 2 Puff(s) Inhalation every 6 hours  budesonide 160 MICROgram(s)/formoterol 4.5 MICROgram(s) Inhaler 2 Puff(s) Inhalation two times a day  citalopram 10 milliGRAM(s) Oral daily  dexAMETHasone     Tablet 6 milliGRAM(s) Oral daily  enoxaparin Injectable 40 milliGRAM(s) SubCutaneous every 24 hours  fluticasone propionate 50 MICROgram(s)/spray Nasal Spray 1 Spray(s) Both Nostrils two times a day  folic acid 1 milliGRAM(s) Oral daily  guaiFENesin  milliGRAM(s) Oral every 12 hours  ipratropium 17 MICROgram(s) HFA Inhaler 2 Puff(s) Inhalation every 6 hours  levalbuterol 45 MICROgram(s) HFA Inhaler 2 Puff(s) Inhalation every 6 hours  lidocaine   4% Patch 1 Patch Transdermal daily  melatonin 3 milliGRAM(s) Oral at bedtime  metoprolol tartrate 50 milliGRAM(s) Oral two times a day  pantoprazole    Tablet 40 milliGRAM(s) Oral before breakfast  polyethylene glycol 3350 17 Gram(s) Oral daily  senna 1 Tablet(s) Oral daily  sodium bicarbonate 650 milliGRAM(s) Oral three times a day      TELEMETRY:  nsr	    ECG:  	  RADIOLOGY:   DIAGNOSTIC TESTING:  [ ] Echocardiogram:  [ ]  Catheterization:  [ ] Stress Test:    OTHER: 	    LABS:	 	                            8.9    10.89 )-----------( 247      ( 02 Dec 2022 04:20 )             28.9     12-02    132<L>  |  98  |  18  ----------------------------<  83  3.8   |  25  |  0.44<L>    Ca    8.1<L>      02 Dec 2022 04:20  Phos  3.3     12-01  Mg     2.00     12-01    TPro  5.6<L>  /  Alb  3.0<L>  /  TBili  0.8  /  DBili  x   /  AST  16  /  ALT  8   /  AlkPhos  60  12-02

## 2022-12-02 NOTE — CHART NOTE - NSCHARTNOTESELECT_GEN_ALL_CORE
Chart Note/Nutrition Services
Event Note
Nutrition Services
Chart Note/Nutrition Services
Event Note
IR Pre-Procedure/Event Note
Nutrition Services
Rheumatology/Event Note

## 2022-12-02 NOTE — CHART NOTE - NSCHARTNOTEFT_GEN_A_CORE
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 Patient states that recently she has been feeling very fatigued.      Nutrition Course: Nutrition interview: No recent episodes of nausea, vomiting, diarrhea or constipation, BM noted on 11/29 per RN flowsheets. Denies any chewing/swallowing difficulties. Food preferences explored and noted. Intake is 5% per pt. Pt states she has not been consuming the Ensure Clear 3x daily (540 jose luis and 24 gm protein), RD and Pt went over importance of consuming adequate nutrition through food and supplements given. Pt states she will try to consume supplements. RD and Pt went over food presences at length, and will note CBORD with Pt's preferences. Feeding skills: set up help required.     Diet Prescription: Diet, Regular:   1500mL Fluid Restriction ([id number])  Supplement Feeding Modality:  Oral  Ensure Clear Cans or Servings Per Day:  1       Frequency:  Three Times a day (11-28-22 @ 16:57)    Pertinent Medications: MEDICATIONS  (STANDING):  acetaminophen   IVPB .. 750 milliGRAM(s) IV Intermittent once  albuterol    90 MICROgram(s) HFA Inhaler 2 Puff(s) Inhalation every 6 hours  budesonide 160 MICROgram(s)/formoterol 4.5 MICROgram(s) Inhaler 2 Puff(s) Inhalation two times a day  citalopram 10 milliGRAM(s) Oral daily  dexAMETHasone     Tablet 6 milliGRAM(s) Oral daily  enoxaparin Injectable 40 milliGRAM(s) SubCutaneous every 24 hours  fluticasone propionate 50 MICROgram(s)/spray Nasal Spray 1 Spray(s) Both Nostrils two times a day  folic acid 1 milliGRAM(s) Oral daily  guaiFENesin  milliGRAM(s) Oral every 12 hours  ipratropium 17 MICROgram(s) HFA Inhaler 2 Puff(s) Inhalation every 6 hours  levalbuterol 45 MICROgram(s) HFA Inhaler 2 Puff(s) Inhalation every 6 hours  lidocaine   4% Patch 1 Patch Transdermal daily  melatonin 3 milliGRAM(s) Oral at bedtime  metoprolol tartrate 50 milliGRAM(s) Oral two times a day  pantoprazole    Tablet 40 milliGRAM(s) Oral before breakfast  polyethylene glycol 3350 17 Gram(s) Oral daily  senna 1 Tablet(s) Oral daily  sodium bicarbonate 650 milliGRAM(s) Oral three times a day    MEDICATIONS  (PRN):  acetaminophen     Tablet .. 650 milliGRAM(s) Oral every 6 hours PRN Mild Pain (1 - 3)  benzonatate 200 milliGRAM(s) Oral three times a day PRN Cough  naloxone Injectable 0.1 milliGRAM(s) IV Push every 3 minutes PRN For ANY of the following changes in patient status:  A. RR LESS THAN 10 breaths per minute, B. Oxygen saturation LESS THAN 90%, C. Sedation score of 6  ondansetron Injectable 4 milliGRAM(s) IV Push every 6 hours PRN Nausea    Pertinent Labs: 12-02 Na132 mmol/L<L> Glu 83 mg/dL K+ 3.8 mmol/L Cr  0.44 mg/dL<L> BUN 18 mg/dL 12-01 Phos 3.3 mg/dL 12-02 Alb 3.0 g/dL<L>      Weight: Height (cm): 147.3 (11-10 @ 02:06), 154.9 (10-04 @ 11:51)  Weight (kg): 54.9 (11-18), 58.1 (11-10 @ 02:06), 62.6 (10-04 @ 12:30)  BMI (kg/m2): 26.8 (11-10 @ 02:06), 26.1 (10-04 @ 12:30)  Weight Assessment: Pt noted with wt loss x 8d (-5.4%). etiology unknown however Pt is not consuming much nutrition thus could be true wt loss at this time.      Physical Assessment, per flowsheets:  Edema: None noted  Skin: intact    Estimated Needs:   [X] No change since previous assessment    Previous Nutrition Diagnosis:  [ x] Malnutrition   Nutrition Diagnosis is [ x] ongoing  [ ] resolved [ ] not applicable   New Nutrition Diagnosis: [ x] not applicable     Interventions:   1) Continue on regular diet, 1500ml fluid restriction per MD discretion  2) Continue to provide Ensure Clear 3x daily (540 jose luis and 24 gm protein).   3) Encourage PO intake and honor food preferences as able.  5) Obtain weekly wts  6) RD to f/u prn     Monitor & Evaluate:  PO intake, tolerance to diet/supplement, nutrition related lab values, weight trends, BMs/GI distress, hydration status, skin integrity.    Erlinda Amezquita MS, RDN (Pager #[phone]) | Also available on TEAMS

## 2022-12-02 NOTE — PROGRESS NOTE ADULT - SUBJECTIVE AND OBJECTIVE BOX
[facility], Division of Infectious Diseases  ANDREW Rodríguez Y. Patel, S. Shah, G. Bebeto  [phone]  ([phone] - weekdays after 5pm and weekends)    Name: BETTIE PRATER  Age/Gender: 81y Female  MRN: [id number]    Interval History:  Patient seen this morning.   Notes reviewed.   No concerning overnight events.  Afebrile.   states she feels well and has been sleeping well  denies SOB, dysuria, abd pain    Allergies: codeine (Nausea)  doxycycline (Nausea)  penicillin (Hives)      Objective:  Vitals:   T(F): 97.2 (12-02-22 @ 08:05), Max: 97.9 (12-02-22 @ 05:20)  HR: 67 (12-02-22 @ 08:05) (67 - 77)  BP: 153/80 (12-02-22 @ 08:05) (136/60 - 156/64)  RR: 17 (12-02-22 @ 08:05) (17 - 18)  SpO2: 99% (12-02-22 @ 08:05) (98% - 100%)  Physical Examination:  General: no acute distress  HEENT: anicteric  Cardio: normal rate  Resp: clear to auscultation anteriorly  Abd: soft, NT, ND  Ext: no LE edema  Skin: warm, dry    Laboratory Studies:  CBC:                       8.9    10.89 )-----------( 247      ( 02 Dec 2022 04:20 )             28.9     WBC Trend:  10.89 12-02-22 @ 04:20  8.32 12-01-22 @ 05:45  9.61 11-30-22 @ 07:05  10.72 11-29-22 @ 06:00  9.93 11-28-22 @ 06:09  5.50 11-27-22 @ 06:54  8.53 11-26-22 @ 06:56    CMP: 12-02    132<L>  |  98  |  18  ----------------------------<  83  3.8   |  25  |  0.44<L>    Ca    8.1<L>      02 Dec 2022 04:20  Phos  3.3     12-01  Mg     2.00     12-01    TPro  5.6<L>  /  Alb  3.0<L>  /  TBili  0.8  /  DBili  x   /  AST  16  /  ALT  8   /  AlkPhos  60  12-02      LIVER FUNCTIONS - ( 02 Dec 2022 04:20 )  Alb: 3.0 g/dL / Pro: 5.6 g/dL / ALK PHOS: 60 U/L / ALT: 8 U/L / AST: 16 U/L / GGT: x               Microbiology: reviewed       Radiology: reviewed     Medications:  acetaminophen     Tablet .. 650 milliGRAM(s) Oral every 6 hours PRN  acetaminophen   IVPB .. 750 milliGRAM(s) IV Intermittent once  albuterol    90 MICROgram(s) HFA Inhaler 2 Puff(s) Inhalation every 6 hours  benzonatate 200 milliGRAM(s) Oral three times a day PRN  budesonide 160 MICROgram(s)/formoterol 4.5 MICROgram(s) Inhaler 2 Puff(s) Inhalation two times a day  citalopram 10 milliGRAM(s) Oral daily  dexAMETHasone     Tablet 6 milliGRAM(s) Oral daily  enoxaparin Injectable 40 milliGRAM(s) SubCutaneous every 24 hours  fluticasone propionate 50 MICROgram(s)/spray Nasal Spray 1 Spray(s) Both Nostrils two times a day  folic acid 1 milliGRAM(s) Oral daily  guaiFENesin  milliGRAM(s) Oral every 12 hours  ipratropium 17 MICROgram(s) HFA Inhaler 2 Puff(s) Inhalation every 6 hours  levalbuterol 45 MICROgram(s) HFA Inhaler 2 Puff(s) Inhalation every 6 hours  lidocaine   4% Patch 1 Patch Transdermal daily  melatonin 3 milliGRAM(s) Oral at bedtime  metoprolol tartrate 50 milliGRAM(s) Oral two times a day  naloxone Injectable 0.1 milliGRAM(s) IV Push every 3 minutes PRN  ondansetron Injectable 4 milliGRAM(s) IV Push every 6 hours PRN  pantoprazole    Tablet 40 milliGRAM(s) Oral before breakfast  polyethylene glycol 3350 17 Gram(s) Oral daily  senna 1 Tablet(s) Oral daily  sodium bicarbonate 650 milliGRAM(s) Oral three times a day    Antimicrobials:

## 2022-12-02 NOTE — PROGRESS NOTE ADULT - SUBJECTIVE AND OBJECTIVE BOX
No acute N/V  No acute SOB or CP    Vital Signs Last 24 Hrs  T(C): 36.2 (02 Dec 2022 08:05), Max: 36.6 (02 Dec 2022 05:20)  T(F): 97.2 (02 Dec 2022 08:05), Max: 97.9 (02 Dec 2022 05:20)  HR: 67 (02 Dec 2022 08:05) (67 - 77)  BP: 153/80 (02 Dec 2022 08:05) (136/60 - 156/64)  BP(mean): --  RR: 17 (02 Dec 2022 08:05) (17 - 18)  SpO2: 99% (02 Dec 2022 08:05) (99% - 100%)    I&O's Summary        PHYSICAL EXAM:  GENERAL: NAD, thin-elderly, comfortable  HEAD:  Atraumatic, Normocephalic  EYES: EOMI, PERRLA, conjunctiva and sclera clear  NECK: Supple, No JVD  CHEST/LUNG: mild decrease breath sounds bilaterally @ bases; no rales or wheezes  HEART: Regular rate and rhythm; No murmurs, rubs, or gallops  ABDOMEN: Soft, Nontender, Nondistended; Bowel sounds present  Neuro: AAOx3, no focal weakness   EXTREMITIES:  2+ Peripheral Pulses, No clubbing, cyanosis, trace edema    LABS:                        8.9    10.89 )-----------( 247      ( 02 Dec 2022 04:20 )             28.9     12-02    132<L>  |  98  |  18  ----------------------------<  83  3.8   |  25  |  0.44<L>    Ca    8.1<L>      02 Dec 2022 04:20  Phos  3.3     12-01  Mg     2.00     12-01    TPro  5.6<L>  /  Alb  3.0<L>  /  TBili  0.8  /  DBili  x   /  AST  16  /  ALT  8   /  AlkPhos  60  12-02      CAPILLARY BLOOD GLUCOSE                RADIOLOGY & ADDITIONAL TESTS:    Imaging Personally Reviewed:  [x] YES  [ ] NO    Will obtain old records:  [ ] YES  [x] NO

## 2022-12-02 NOTE — PROGRESS NOTE ADULT - ASSESSMENT
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 Patient states that recently she has been feeling very fatigued. She states that moving around her house, or even to the bathroom, has been causing her to get very tired and causes her some minor chest pain, which resolves quickly with rest. She states that her breathing has been non-labored, denies dyspnea, shortness of breath, inability to catch her breath. She states that she has also not had much of an appetite over the last month and has been eating mostly soft foods. She admits to about a 5lb weight loss over the last month. She admits to a minor cough that occasionally occurs in fits and makes her feel as if she may vomit. Patient admits to some nausea and aversion to food but denies mechanical issues, inability to eat or feeling of choking. Denies hematemesis, hemoptysis.   (09 Nov 2022 12:31)      Hyponatremia  Likely SIADH  free water restriction <1L/day  Na dropped again today  check urine na and osmo  continue fluid restriction  monitor    Hypophosphatemia  supplement as needed  Check phosphorus daily.     Anemia  Defer to hematology    hypocalcemia  sec to low alb  vit d 1,25 ok  Monitor

## 2022-12-02 NOTE — PROGRESS NOTE ADULT - SUBJECTIVE AND OBJECTIVE BOX
[facility] [place] [facility]ACT[facility]   MD RONEL FRIAS MD KRISTINE SOLTANPOUR, DO ANGELA WONG, PA    TEL:  OFFICE: [phone]  From 5pm-7am Answering Service [phone]    -- RENAL FOLLOW UP NOTE ---Date of Service 12-02-22 @ 12:11    Patient is a 81y old  Female who presents with a chief complaint of RVATS, RUL Nodule Biopsy w/ IR marking (02 Dec 2022 10:12)      Patient seen and examined at bedside. No chest pain/sob    VITALS:  T(F): 97.2 (12-02-22 @ 08:05), Max: 97.9 (12-02-22 @ 05:20)  HR: 67 (12-02-22 @ 08:05)  BP: 153/80 (12-02-22 @ 08:05)  RR: 17 (12-02-22 @ 08:05)  SpO2: 99% (12-02-22 @ 08:05)  Wt(kg): --        PHYSICAL EXAM:  General: NAD  Neck: No JVD  Respiratory: CTAB, no wheezes, rales or rhonchi  Cardiovascular: S1, S2, RRR  Gastrointestinal: BS+, soft, NT/ND  Extremities: No peripheral edema    Hospital Medications:   MEDICATIONS  (STANDING):  acetaminophen   IVPB .. 750 milliGRAM(s) IV Intermittent once  albuterol    90 MICROgram(s) HFA Inhaler 2 Puff(s) Inhalation every 6 hours  budesonide 160 MICROgram(s)/formoterol 4.5 MICROgram(s) Inhaler 2 Puff(s) Inhalation two times a day  citalopram 10 milliGRAM(s) Oral daily  dexAMETHasone     Tablet 6 milliGRAM(s) Oral daily  enoxaparin Injectable 40 milliGRAM(s) SubCutaneous every 24 hours  fluticasone propionate 50 MICROgram(s)/spray Nasal Spray 1 Spray(s) Both Nostrils two times a day  folic acid 1 milliGRAM(s) Oral daily  guaiFENesin  milliGRAM(s) Oral every 12 hours  ipratropium 17 MICROgram(s) HFA Inhaler 2 Puff(s) Inhalation every 6 hours  levalbuterol 45 MICROgram(s) HFA Inhaler 2 Puff(s) Inhalation every 6 hours  lidocaine   4% Patch 1 Patch Transdermal daily  melatonin 3 milliGRAM(s) Oral at bedtime  metoprolol tartrate 50 milliGRAM(s) Oral two times a day  pantoprazole    Tablet 40 milliGRAM(s) Oral before breakfast  polyethylene glycol 3350 17 Gram(s) Oral daily  senna 1 Tablet(s) Oral daily  sodium bicarbonate 650 milliGRAM(s) Oral three times a day      LABS:  12-02    132<L>  |  98  |  18  ----------------------------<  83  3.8   |  25  |  0.44<L>    Ca    8.1<L>      02 Dec 2022 04:20  Phos  3.3     12-01  Mg     2.00     12-01    TPro  5.6<L>  /  Alb  3.0<L>  /  TBili  0.8  /  DBili      /  AST  16  /  ALT  8   /  AlkPhos  60  12-02    Creatinine Trend: 0.44 <--, 0.45 <--, 0.46 <--, 0.49 <--, 0.52 <--, 0.48 <--, 0.47 <--    Albumin, Serum: 3.0 g/dL (12-02 @ 04:20)                              8.9    10.89 )-----------( 247      ( 02 Dec 2022 04:20 )             28.9     Urine Studies:  Urinalysis - [11-17-22 @ 20:04]      Color Judy / Appearance Clear / SG 1.044 / pH 6.5      Gluc Negative / Ketone Small  / Bili Small / Urobili >12 mg/dL       Blood Small / Protein 100 mg/dL / Leuk Est Negative / Nitrite Negative      RBC 12 / WBC 11 / Hyaline 9 / Gran  / Sq Epi  / Non Sq Epi 11 / Bacteria Few      Iron 97, TIBC <127, %sat --      [11-10-22 @ 06:55]  Ferritin 1570      [11-25-22 @ 06:00]  Vitamin D (25OH) 38.3      [11-15-22 @ 06:47]  Lipid: chol 84, TG 81, HDL 22, LDL --      [10-01-22 @ 07:10]    HBsAb Nonreact      [11-11-22 @ 04:15]  HBsAg Nonreact      [11-11-22 @ 04:15]  HBcAb Nonreact      [11-11-22 @ 04:15]  HCV 0.10, Nonreact      [11-15-22 @ 06:47]    ANCA: cANCA Negative, pANCA Negative, atypical ANCA Indeterminate Method interference due to CATHERINE Fluorescence      [11-10-22 @ 06:55]  MPO-ANCA <5.0, interpretation: Negative      [11-10-22 @ 06:55]  PR3-ANCA <5.0, interpretation: Negative      [11-10-22 @ 06:55]    RADIOLOGY & ADDITIONAL STUDIES:

## 2022-12-02 NOTE — DISCHARGE NOTE NURSING/CASE MANAGEMENT/SOCIAL WORK - NSDCPEFALRISK_GEN_ALL_CORE
For information on Fall & Injury Prevention, visit: [email or web address] OR  [email or web address] OR  https://www.cdc.gov/steadi/patient.html
For information on Fall & Injury Prevention, visit: [email or web address] OR  [email or web address] OR  https://www.cdc.gov/steadi/patient.html

## 2022-12-02 NOTE — PROGRESS NOTE ADULT - SUBJECTIVE AND OBJECTIVE BOX
Date of Service: 12-02-22 @ 15:22    Patient is a 81y old  Female who presents with a chief complaint of RVATS, RUL Nodule Biopsy w/ IR marking (02 Dec 2022 15:17)      Any change in ROS:  doing ok : no sob:  no issues overnight  on room air:  feels pretty good:     MEDICATIONS  (STANDING):  acetaminophen   IVPB .. 750 milliGRAM(s) IV Intermittent once  albuterol    90 MICROgram(s) HFA Inhaler 2 Puff(s) Inhalation every 6 hours  budesonide 160 MICROgram(s)/formoterol 4.5 MICROgram(s) Inhaler 2 Puff(s) Inhalation two times a day  citalopram 10 milliGRAM(s) Oral daily  dexAMETHasone     Tablet 6 milliGRAM(s) Oral daily  enoxaparin Injectable 40 milliGRAM(s) SubCutaneous every 24 hours  fluticasone propionate 50 MICROgram(s)/spray Nasal Spray 1 Spray(s) Both Nostrils two times a day  folic acid 1 milliGRAM(s) Oral daily  guaiFENesin  milliGRAM(s) Oral every 12 hours  ipratropium 17 MICROgram(s) HFA Inhaler 2 Puff(s) Inhalation every 6 hours  levalbuterol 45 MICROgram(s) HFA Inhaler 2 Puff(s) Inhalation every 6 hours  lidocaine   4% Patch 1 Patch Transdermal daily  melatonin 3 milliGRAM(s) Oral at bedtime  metoprolol tartrate 50 milliGRAM(s) Oral two times a day  pantoprazole    Tablet 40 milliGRAM(s) Oral before breakfast  polyethylene glycol 3350 17 Gram(s) Oral daily  senna 1 Tablet(s) Oral daily  sodium bicarbonate 650 milliGRAM(s) Oral three times a day    MEDICATIONS  (PRN):  acetaminophen     Tablet .. 650 milliGRAM(s) Oral every 6 hours PRN Mild Pain (1 - 3)  benzonatate 200 milliGRAM(s) Oral three times a day PRN Cough  naloxone Injectable 0.1 milliGRAM(s) IV Push every 3 minutes PRN For ANY of the following changes in patient status:  A. RR LESS THAN 10 breaths per minute, B. Oxygen saturation LESS THAN 90%, C. Sedation score of 6  ondansetron Injectable 4 milliGRAM(s) IV Push every 6 hours PRN Nausea    Vital Signs Last 24 Hrs  T(C): 36.2 (02 Dec 2022 08:05), Max: 36.6 (02 Dec 2022 05:20)  T(F): 97.2 (02 Dec 2022 08:05), Max: 97.9 (02 Dec 2022 05:20)  HR: 67 (02 Dec 2022 08:05) (67 - 77)  BP: 153/80 (02 Dec 2022 08:05) (136/60 - 156/64)  BP(mean): --  RR: 17 (02 Dec 2022 08:05) (17 - 18)  SpO2: 99% (02 Dec 2022 08:05) (99% - 100%)    Parameters below as of 02 Dec 2022 08:05  Patient On (Oxygen Delivery Method): room air        I&O's Summary        Physical Exam:   GENERAL: NAD, well-groomed, well-developed  HEENT: RANJIT/   Atraumatic, Normocephalic  ENMT: No tonsillar erythema, exudates, or enlargement; Moist mucous membranes, Good dentition, No lesions  NECK: Supple, No JVD, Normal thyroid  CHEST/LUNG: Clear to auscultaion,- no wheezing  CVS: Regular rate and rhythm; No murmurs, rubs, or gallops  GI: : Soft, Nontender, Nondistended; Bowel sounds present  NERVOUS SYSTEM:  Alert & Oriented X3  EXTREMITIES: -edema  LYMPH: No lymphadenopathy noted  SKIN: No rashes or lesions  ENDOCRINOLOGY: No Thyromegaly  PSYCH: Appropriate    Labs:                              8.9    10.89 )-----------( 247      ( 02 Dec 2022 04:20 )             28.9                         8.6    8.32  )-----------( 265      ( 01 Dec 2022 05:45 )             27.6                         8.4    9.61  )-----------( 297      ( 30 Nov 2022 07:05 )             27.1                         8.7    10.72 )-----------( 316      ( 29 Nov 2022 06:00 )             29.1     12-02    132<L>  |  98  |  18  ----------------------------<  83  3.8   |  25  |  0.44<L>  12-01    137  |  101  |  19  ----------------------------<  98  4.0   |  27  |  0.45<L>  11-30    139  |  102  |  23  ----------------------------<  110<H>  3.7   |  27  |  0.46<L>  11-29    140  |  104  |  23  ----------------------------<  98  3.9   |  26  |  0.49<L>    Ca    8.1<L>      02 Dec 2022 04:20  Ca    8.1<L>      01 Dec 2022 05:45  Phos  3.3     12-01  Mg     2.00     12-01    TPro  5.6<L>  /  Alb  3.0<L>  /  TBili  0.8  /  DBili  x   /  AST  16  /  ALT  8   /  AlkPhos  60  12-02  TPro  5.5<L>  /  Alb  2.8<L>  /  TBili  0.6  /  DBili  x   /  AST  14  /  ALT  8   /  AlkPhos  55  12-01  TPro  5.5<L>  /  Alb  2.7<L>  /  TBili  0.5  /  DBili  x   /  AST  15  /  ALT  6   /  AlkPhos  51  11-30  TPro  6.0  /  Alb  2.8<L>  /  TBili  0.5  /  DBili  x   /  AST  12  /  ALT  <5<L>  /  AlkPhos  55  11-29    CAPILLARY BLOOD GLUCOSE          LIVER FUNCTIONS - ( 02 Dec 2022 04:20 )  Alb: 3.0 g/dL / Pro: 5.6 g/dL / ALK PHOS: 60 U/L / ALT: 8 U/L / AST: 16 U/L / GGT: x               D-Dimer Assay, Quantitative: 1291 ng/mL DDU (12-01 @ 05:45)  D-Dimer Assay, Quantitative: 1111 ng/mL DDU (11-29 @ 06:00)  D-Dimer Assay, Quantitative: 2028 ng/mL DDU (11-28 @ 06:09)  D-Dimer Assay, Quantitative: 1867 ng/mL DDU (11-27 @ 06:54)    rad< from: Xray Chest 1 View- PORTABLE-Routine (Xray Chest 1 View- PORTABLE-Routine .) (11.26.22 @ 18:48) >      INTERPRETATION:  Chest one view    HISTORY: Covid-19    COMPARISON STUDY: 11/22/2022    Frontal expiratory view of the chest shows the heart to be similar in   size. Left chest port is again noted.    The lungs show similar patchy infiltrates with small pleural effusions   and there is no evidence of pneumothorax.    IMPRESSION:  Similar patchy infiltrates.        Thank you for the courtesy of this referral.    --- End of Report ---            ALEXA ZABALA MD; Attending Interventional Radiologist  This document has been electronically signed. Nov 27 2022  3:59PM    < end of copied text >  < from: CT Angio Chest PE Protocol w/ IV Cont (11.16.22 @ 20:31) >    UPPER ABDOMEN: Within normal limits.    BONES/SOFT TISSUES: Left subcutaneous chest wall port with distal tip   terminating in the right atrium. Degenerative changes of the spine.   Unchanged compression deformities of T11 and L1 vertebral bodies.    IMPRESSION:    No pulmonary embolism.    Status post right upper lobe recent wedge resection with associated   postsurgical change. Right upper paratracheal 1.9 right 2.6 cm   hypodensity as described above may be postoperative in etiology. 3 month   follow-up noncontrast chest CT can be performed for further evaluation.    Moderate right and small left pleural effusions with some interlobular   septal thickening suggestive of pulmonary edema.    < end of copied text >      RECENT CULTURES:        RESPIRATORY CULTURES:          Studies  Chest X-RAY  CT SCAN Chest   Venous Dopplers: LE:   CT Abdomen  Others

## 2022-12-02 NOTE — PROGRESS NOTE ADULT - ASSESSMENT
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), s/p LVATS, LUIS wedge resection (2021) who presented as for RVATS, RUL Nodule Biopsy    COVID  s/p remdesivir x 5 day course, completed 11/25  trend inflammatory markers  monitor SpO2, supplemental O2 as needed, wean as tolerated  c/w decadron x 10 days  supportive care  isolation per infection control policy   discharge planning    vasculitis  pathology concerning for vasculitis, special stains negative for microorganisms  rheumatology recs- transition to prednisone 20mg daily after decadron complete & f/u outpt  poor appetite will likely improve while on steroids    fever- resolved  quant gold negative  US LE neg for DVT  s/p thoracentesis 11/17, exudative effusion, cultures- no growth  blood cultures- no growth final  pathology special stains negative for microorganisms  all cultures w/o growth, cefepime discontinued 11/22    DLBCL  s/p R VATS with RUL wedge resection and mediastinal LN dissection on 11/10/22  pathology- heterogenous population of lymphocytes; not consistent w/ recurrence  hem/onc following    penicillin allergy  reports reaction- welts as a child  tolerated ceftriaxone and cefepime    We will sign off. Thank you for allowing us to participate in the care of Ms. Campos. Please feel free to call with any questions or concerns.     Gio Tate M.D.  [facility], Division of Infectious Diseases  [phone]  After 5pm on weekdays and all day on weekends - please call [phone]

## 2022-12-02 NOTE — DISCHARGE NOTE NURSING/CASE MANAGEMENT/SOCIAL WORK - PATIENT PORTAL LINK FT
You can access the FollowMyHealth Patient Portal offered by [facility] by registering at the following website: [email or web address]. By joining [employer]’s FollowMyHealth portal, you will also be able to view your health information using other applications (apps) compatible with our system.
You can access the FollowMyHealth Patient Portal offered by [facility] by registering at the following website: [email or web address]. By joining [employer]’s FollowMyHealth portal, you will also be able to view your health information using other applications (apps) compatible with our system.

## 2022-12-02 NOTE — PROGRESS NOTE ADULT - ASSESSMENT
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 Patient is admitted to be optimized for her surgical procedure.    Plan: OR tomorrow for RVATS, RUL Nodule Biopsy w/ IR Marking.    1: hx of lymphoma: DBLCL  2: Pleural effusion :  3: HLD:   '4: ANCA ASSOCIATED VASCULITIS      11/17:    1: hx of lymphoma: DBLCL: S/P SURGERY RUL wedge resection and LN biopsy : await results:   2: Pleural effusion : not much of chest xray  : but ct scan chest revelas more pleurl effusion on rt sdie then left:  send for chem and cultures   3: HLD: : per PMD  '4: ANCA ASSOCIATED VASCULITIS : sHE HAD FEVER:  RHEUMATOLOGY FOLLOWING ON ANTIBIOTICS:    DW THORACIC     11/18:    1: hx of lymphoma: DBLCL: S/P SURGERY RUL wedge resection and LN biopsy : await results:   2: Pleural effusion : not much of chest xray  : but ct scan chest reveals more pleural effusion on rt side then left: s/p thoracentesis : sent for cultures:  has exudative effusion ? malignancy ? lymphoma  3: HLD: : per PMD  '4: ANCA ASSOCIATED VASCULITIS : ssHE HAD FEVER:  RHEUMATOLOGY FOLLOWING ON ANTIBIOTICS: AFEBRILE IN LAST 24 HOURS AND IS ON CEFEPIME:     DW THORACIC AND ACP     11/20:      1: hx of lymphoma: DBLCL: S/P SURGERY RUL wedge resection and LN biopsy : await results:   2: Pleural effusion : not much of chest xray  : but ct scan chest reveals more pleural effusion on rt side then left: s/p thoracentesis : sent for cultures:  has exudative effusion ? malignancy ? lymphoma : await flow cyto   3: HLD: : per PMD  '4: ANCA ASSOCIATED VASCULITIS : ssHE HAD FEVER:  RHEUMATOLOGY FOLLOWING ON ANTIBIOTICS: AFEBRILE IN LAST 72 HOURS AND IS ON CEFEPIME:     DW  ACP     11/21:      1: hx of lymphoma: DBLCL: S/P SURGERY RUL wedge resection and LN biopsy : await results:   2: Pleural effusion : not much of chest xray  : but ct scan chest reveals more pleural effusion on rt side then left: s/p thoracentesis : sent for cultures:  has exudative effusion ? malignancy ? lymphoma : await flow cyto   3: HLD: : per PMD  '4: ANCA ASSOCIATED VASCULITIS : SHE HAD FEVER:  RHEUMATOLOGY FOLLOWING ON ANTIBIOTICS: AFEBRILE IN LAST 72 HOURS AND IS still off CEFEPIME: now  5: now with covid : on remdesivir:  : she is on  2 L of oxygen : but on record she did not desaturate too much      DW  ACP     11/22    1: hx of lymphoma: DBLCL: S/P SURGERY RUL wedge resection and LN biopsy : await results:   2: Pleural effusion : not much of chest xray  : but ct scan chest reveals more pleural effusion on rt side then left: s/p thoracentesis : sent for cultures:  has exudative effusion ? malignancy ? lymphoma : await flow cyto   3: HLD: : per PMD  '4: ANCA ASSOCIATED VASCULITIS : SHE HAD FEVER:  RHEUMATOLOGY FOLLOWING ON ANTIBIOTICS: AFEBRILE IN LAST 72 HOURS AND IS  off CEFEPIME: now  5: now with covid : on remdesivir:  : she is on  2 L of oxygen : but on record she did not desaturate too much  : she is on it for comfort:  demi acp : to remove oxygen and see her real o2 sao2: HER D DIMER NEEDS TO BE CHECKED    dw acp    11/23:      1: hx of lymphoma: DBLCL: S/P SURGERY RUL wedge resection and LN biopsy : await results:   2: Pleural effusion : not much of chest xray  : but ct scan chest reveals more pleural effusion on rt side then left: s/p thoracentesis : sent for cultures:  has exudative effusion ? malignancy ? lymphoma : await flow cyto   3: HLD: : per PMD  '4: ANCA ASSOCIATED VASCULITIS : SHE HAD FEVER:  RHEUMATOLOGY FOLLOWING ON ANTIBIOTICS: AFEBRILE IN LAST 72 HOURS AND IS  off CEFEPIME: now  5: now with covid : on remdesivir:  : she is on  2 L of oxygen : but on record she did not desaturate too much  : she is on it for comfort:  demi acp : to remove oxygen and see her real o2 sao2: HER D DIMER is high   ut she already had negative pe:     dw acp    11/24:    1: hx of lymphoma: DBLCL: S/P SURGERY RUL wedge resection and LN biopsy : await results:   2: Pleural effusion : not much of chest xray  : but ct scan chest reveals more pleural effusion on rt side then left: s/p thoracentesis : sent for cultures:  has exudative effusion ? malignancy ? lymphoma :   3: HLD: : per PMD  '4: ANCA ASSOCIATED VASCULITIS : SHE HAD FEVER:  RHEUMATOLOGY FOLLOWING ON ANTIBIOTICS: AFEBRILE IN LAST 72 HOURS AND IS  off CEFEPIME: now  5: now with covid : on remdesivir still   : she is on  2 L of oxygen : but on record she did not desaturate too much  : she is on it for comfort:  dw acp : to remove oxygen and see her real o2 sao2: HER D DIMER is high   but she already had negative pe: not on steroids per ID     dw acp    11/25:    1: hx of lymphoma: DBLCL: S/P SURGERY RUL wedge resection and LN biopsy : previous ebus biopsy was negative:  no granulomas reproted  2: Pleural effusion : not much of chest xray  : but ct scan chest reveals more pleural effusion on rt side then left: s/p thoracentesis : sent for cultures:  has exudative effusion ? malignancy ? lymphoma :   3: HLD: : per PMD  '4: ANCA ASSOCIATED VASCULITIS : SHE HAD FEVER:  RHEUMATOLOGY FOLLOWING ON ANTIBIOTICS: AFEBRILE IN LAST 72 HOURS AND IS  off CEFEPIME: now  5: now with covid : on remdesivir still   : she is on  2 L of oxygen : but on record she did not desaturate too much  : she is on it for comfort:  dw acp : to remove oxygen and see her real o2 sao2: HER D DIMER is high   but she already had negative pe: not on steroids per ID     dw acp    11/27:    1: hx of lymphoma: DBLCL: S/P SURGERY RUL wedge resection and LN biopsy : previous ebus biopsy was negative:  no granulomas reported  2: Pleural effusion : not much of chest xray  : but ct scan chest reveals more pleural effusion on rt side then left: s/p thoracentesis : sent for cultures:  has exudative effusion ? malignancy ? lymphoma :   3: HLD: : per PMD  '4: ANCA ASSOCIATED VASCULITIS : SHE HAD FEVER:  RHEUMATOLOGY FOLLOWING ON ANTIBIOTICS: AFEBRILE IN LAST 72 HOURS AND IS  off CEFEPIME: now  5: now with covid : on remdesivir still   :dexa started on for wheezing +    dw PMD    11/28:    1: hx of lymphoma: DBLCL: S/P SURGERY RUL wedge resection and LN biopsy : previous ebus biopsy was negative:  no granulomas reported  2: Pleural effusion : not much of chest xray  : but ct scan chest reveals more pleural effusion on rt side then left: s/p thoracentesis : sent for cultures:  has exudative effusion ? malignancy ? lymphoma :   3: HLD: : per PMD  '4: ANCA ASSOCIATED VASCULITIS : SHE HAD FEVER:  RHEUMATOLOGY FOLLOWING ON ANTIBIOTICS: AFEBRILE IN LAST 72 HOURS AND IS  off CEFEPIME: now :per rheum   5: now with covid : on remdesivir still   :and cont dexa for now : now not much of wheezing:     dw PMD    11/29:    1: Hx of lymphoma: DBLCL: S/P SURGERY RUL wedge resection and LN biopsy : previous ebus biopsy was negative:  no granulomas reported  2: Pleural effusion : not much of chest xray  : but ct scan chest reveals more pleural effusion on rt side then left: s/p thoracentesis : sent for cultures:  has exudative effusion ? malignancy ? lymphoma :   3: HLD: : per PMD  '4: ANCA ASSOCIATED VASCULITIS : SHE HAD FEVER:  RHEUMATOLOGY FOLLOWING ON ANTIBIOTICS: AFEBRILE IN LAST 72 HOURS AND IS  off CEFEPIME: now :per rheum : she is already on steroids:   5: now with covid : on remdesivir still   :and cont dexa for now  fr a totola of 10 days  : now not much of wheezing:     dw acp " she does feel better    11/30:      1: Hx of lymphoma: DBLCL: S/P SURGERY RUL wedge resection and LN biopsy : previous ebus biopsy was negative:  no granulomas reported  2: Pleural effusion : not much of chest xray  : but ct scan chest reveals more pleural effusion on rt side then left: s/p thoracentesis : sent for cultures:  has exudative effusion ? malignancy ? lymphoma :   3: HLD: : per PMD  '4: ANCA ASSOCIATED VASCULITIS : SHE HAD FEVER:  RHEUMATOLOGY FOLLOWING ON ANTIBIOTICS: AFEBRILE IN LAST 96 HOURS AND IS  off CEFEPIME: now :per rheum : she is already on steroids:   5: now with covid : on remdesivir still   :and cont dexa for now  fr a total of 10 days  : now not much of wheezing: : d dimer has decreased significantly     seems to be doing  ok : needs pt ot:  demi santoyo      12/1:      1: Hx of lymphoma: DBLCL: S/P SURGERY RUL wedge resection and LN biopsy : previous ebus biopsy was negative:  no granulomas reported  2: Pleural effusion : not much of chest xray  : but ct scan chest reveals more pleural effusion on rt side then left: s/p thoracentesis : sent for cultures:  has exudative effusion ? malignancy ? lymphoma :   3: HLD: : per PMD  '4: ANCA ASSOCIATED VASCULITIS : SHE HAD FEVER:  RHEUMATOLOGY FOLLOWING ON ANTIBIOTICS: AFEBRILE IN LAST 96 HOURS AND IS  off CEFEPIME: now :per rheum : she is already on steroids:  and will eventually be transitioned to prednisone one dexa course is finished she had minmal ptx on previous ct after surgery   5: now with covid : on remdesivir still   :and cont dexa for now  fr a total of 10 days  : now not much of wheezing: : d dimer has decreased significantly ; but today d diemr has lsihty increased:  but clinically she looks better and is on room air infact today     seems to be doing  ok : needs pt ot:  demi santoyo      12/2:    1: Hx of lymphoma: DBLCL: S/P SURGERY RUL wedge resection and LN biopsy : previous ebus biopsy was negative:  no granulomas reported  2: Pleural effusion : not much of chest xray  : but ct scan chest reveals more pleural effusion on rt side then left: s/p thoracentesis : sent for cultures:  has exudative effusion ? malignancy ? lymphoma :   3: HLD: : per PMD  '4: ANCA ASSOCIATED VASCULITIS : SHE HAD FEVER:  RHEUMATOLOGY FOLLOWING ON ANTIBIOTICS: AFEBRILE IN LAST 96 HOURS AND IS  off CEFEPIME: now :per rheum : she is already on steroids:  and will eventually be transitioned to prednisone one dexa course is finished she had minimal ptx on previous ct after surgery  : she has no sob:  no cough:   5: Now with covid : on remdesivir still   :and cont dexa for now  for a total of 10 days  : now not much of wheezing: : d dimer has decreased significantly ; but today d dimer has slightly increased:  but clinically she looks better and is on room air infact today     seems to be doing  ok : needs pt ot:  demi santoyo

## 2022-12-03 NOTE — PROVIDER CONTACT NOTE (OTHER) - DATE AND TIME:
20-Nov-2022 17:47
20-Nov-2022 22:28
14-Nov-2022 21:00
27-Nov-2022 13:10
29-Nov-2022 13:00
15-Nov-2022 04:19
25-Nov-2022 07:00
29-Nov-2022 18:46
03-Dec-2022 17:39
24-Nov-2022 01:54

## 2022-12-03 NOTE — PROVIDER CONTACT NOTE (OTHER) - RECOMMENDATIONS
Provider to assess at bedside.
Provider made aware no new orders
Provider made aware. Lasix ordered.
Provider to assess at bedside.
Continue to monitor. Safety maintained.
Made NP aware of patients status.
Provider made aware. No new orders at this time. Patient is to be kept awake.
notify provider
notify provider
Provider to assess at bedside and order cough suppressant to make patient comfortable.

## 2022-12-03 NOTE — PROGRESS NOTE ADULT - SUBJECTIVE AND OBJECTIVE BOX
CARDIOLOGY FOLLOW UP - Dr. Hooker  Date of Service: 12/3/2022  CC: no events    Review of Systems:  Constitutional: No fever, weight loss, or fatigue  Respiratory: No cough, wheezing, or hemoptysis, no shortness of breath  Cardiovascular: No chest pain, palpitations, passing out, dizziness, or leg swelling  Gastrointestinal: No abd or epigastric pain. No nausea, vomiting, or hematemesis; no diarrhea or consiptaiton, no melena or hematochezia  Vascular: No edema     TELEMETRY:    PHYSICAL EXAM:  T(C): 36.3 (12-03-22 @ 05:50), Max: 36.7 (12-02-22 @ 20:27)  HR: 81 (12-03-22 @ 05:50) (58 - 81)  BP: 121/52 (12-03-22 @ 05:50) (121/52 - 153/63)  RR: 16 (12-03-22 @ 05:50) (16 - 17)  SpO2: 100% (12-03-22 @ 05:50) (98% - 100%)  Wt(kg): --  I&O's Summary      Appearance: Normal	  Cardiovascular: Normal S1 S2,RRR, No JVD, No murmurs  Respiratory: Lungs clear to auscultation	  Gastrointestinal:  Soft, Non-tender, + BS	  Extremities: Normal range of motion, No clubbing, cyanosis or edema  Vascular: Peripheral pulses palpable 2+ bilaterally       Home Medications:  acetaminophen 325 mg oral tablet: 2 tab(s) orally every 6 hours, As needed, Mild Pain (1 - 3) (01 Dec 2022 13:22)  albuterol 90 mcg/inh inhalation aerosol: 2 puff(s) inhaled every 6 hours (02 Dec 2022 11:18)  budesonide-formoterol 160 mcg-4.5 mcg/inh inhalation aerosol: 2 puff(s) inhaled 2 times a day (02 Dec 2022 11:18)  citalopram 10 mg oral tablet: 1 tab(s) orally once a day (04 Nov 2022 15:16)  dexamethasone 6 mg oral tablet: 1 tab(s) orally once a day (02 Dec 2022 11:14)  enoxaparin: 40 milligram(s) subcutaneous once a day (02 Dec 2022 11:25)  ezetimibe 10 mg oral tablet: 1 tab(s) orally once a day (04 Nov 2022 15:16)  folic acid 1 mg oral tablet: 1 tab(s) orally once a day (04 Nov 2022 15:16)  guaiFENesin 600 mg oral tablet, extended release: 1 tab(s) orally every 12 hours (02 Dec 2022 11:18)  ipratropium CFC free 17 mcg/inh inhalation aerosol: 2 puff(s) inhaled every 6 hours (01 Dec 2022 13:22)  levalbuterol 45 mcg/inh inhalation aerosol: 2 puff(s) inhaled every 6 hours (01 Dec 2022 13:22)  melatonin 3 mg oral tablet: 1 tab(s) orally once a day (at bedtime) (01 Dec 2022 13:22)  metoprolol tartrate 50 mg oral tablet: 1 tab(s) orally 2 times a day (01 Dec 2022 13:22)  omeprazole 20 mg oral delayed release capsule: 1 cap(s) orally prn (04 Nov 2022 15:16)  polyethylene glycol 3350 oral powder for reconstitution: 17 gram(s) orally once a day (02 Dec 2022 11:18)  senna leaf extract oral tablet: 1 tab(s) orally once a day (02 Dec 2022 11:18)  sodium bicarbonate 650 mg oral tablet: 1 tab(s) orally 3 times a day (02 Dec 2022 11:18)        MEDICATIONS  (STANDING):  acetaminophen   IVPB .. 750 milliGRAM(s) IV Intermittent once  albuterol    90 MICROgram(s) HFA Inhaler 2 Puff(s) Inhalation every 6 hours  budesonide 160 MICROgram(s)/formoterol 4.5 MICROgram(s) Inhaler 2 Puff(s) Inhalation two times a day  citalopram 10 milliGRAM(s) Oral daily  dexAMETHasone     Tablet 6 milliGRAM(s) Oral daily  enoxaparin Injectable 40 milliGRAM(s) SubCutaneous every 24 hours  fluticasone propionate 50 MICROgram(s)/spray Nasal Spray 1 Spray(s) Both Nostrils two times a day  folic acid 1 milliGRAM(s) Oral daily  guaiFENesin  milliGRAM(s) Oral every 12 hours  ipratropium 17 MICROgram(s) HFA Inhaler 2 Puff(s) Inhalation every 6 hours  levalbuterol 45 MICROgram(s) HFA Inhaler 2 Puff(s) Inhalation every 6 hours  lidocaine   4% Patch 1 Patch Transdermal daily  melatonin 3 milliGRAM(s) Oral at bedtime  metoprolol tartrate 50 milliGRAM(s) Oral two times a day  pantoprazole    Tablet 40 milliGRAM(s) Oral before breakfast  polyethylene glycol 3350 17 Gram(s) Oral daily  potassium chloride   Powder 40 milliEquivalent(s) Oral once  senna 1 Tablet(s) Oral daily  sodium bicarbonate 650 milliGRAM(s) Oral three times a day        EKG:  RADIOLOGY:  DIAGNOSTIC TESTING:  [ ] Echocardiogram:  [ ] Catherterization:  [ ] Stress Test:  OTHER:     LABS:	 	                          9.8    11.69 )-----------( 233      ( 03 Dec 2022 06:36 )             31.0     12-03    131<L>  |  99  |  17  ----------------------------<  100<H>  3.4<L>   |  23  |  0.45<L>    Ca    8.4      03 Dec 2022 06:36  Phos  2.9     12-03  Mg     1.80     12-03    TPro  5.6<L>  /  Alb  3.0<L>  /  TBili  0.8  /  DBili  x   /  AST  16  /  ALT  8   /  AlkPhos  60  12-02          CARDIAC MARKERS:

## 2022-12-03 NOTE — PROGRESS NOTE ADULT - SUBJECTIVE AND OBJECTIVE BOX
No acute changes in her breathing  No acute pain    Vital Signs Last 24 Hrs  T(C): 36.6 (03 Dec 2022 12:51), Max: 36.7 (02 Dec 2022 20:27)  T(F): 97.9 (03 Dec 2022 12:51), Max: 98 (02 Dec 2022 20:27)  HR: 75 (03 Dec 2022 12:51) (58 - 81)  BP: 134/51 (03 Dec 2022 12:51) (121/52 - 153/63)  BP(mean): --  RR: 17 (03 Dec 2022 12:51) (16 - 17)  SpO2: 97% (03 Dec 2022 12:51) (97% - 100%)    I&O's Summary      PHYSICAL EXAM:  GENERAL: NAD, thin-elderly, comfortable  HEAD:  Atraumatic, Normocephalic  EYES: EOMI, PERRLA, conjunctiva and sclera clear  NECK: Supple, No JVD  CHEST/LUNG: mild decrease breath sounds bilaterally @ bases; no rales or wheezes  HEART: Regular rate and rhythm; No murmurs, rubs, or gallops  ABDOMEN: Soft, Nontender, Nondistended; Bowel sounds present  Neuro: AAOx3, no focal weakness   EXTREMITIES:  2+ Peripheral Pulses, No clubbing, cyanosis, trace edema    LABS:                        9.8    11.69 )-----------( 233      ( 03 Dec 2022 06:36 )             31.0     12-03    131<L>  |  99  |  17  ----------------------------<  100<H>  3.4<L>   |  23  |  0.45<L>    Ca    8.4      03 Dec 2022 06:36  Phos  2.9     12-03  Mg     1.80     12-03    TPro  5.6<L>  /  Alb  3.0<L>  /  TBili  0.8  /  DBili  x   /  AST  16  /  ALT  8   /  AlkPhos  60  12-02      CAPILLARY BLOOD GLUCOSE                RADIOLOGY & ADDITIONAL TESTS:    Imaging Personally Reviewed:  [x] YES  [ ] NO    Will obtain old records:  [ ] YES  [x] NO

## 2022-12-03 NOTE — PROGRESS NOTE ADULT - ASSESSMENT
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 Patient states that recently she has been feeling very fatigued. She states that moving around her house, or even to the bathroom, has been causing her to get very tired and causes her some minor chest pain, which resolves quickly with rest. She states that her breathing has been non-labored, denies dyspnea, shortness of breath, inability to catch her breath. She states that she has also not had much of an appetite over the last month and has been eating mostly soft foods. She admits to about a 5lb weight loss over the last month. She admits to a minor cough that occasionally occurs in fits and makes her feel as if she may vomit. Patient admits to some nausea and aversion to food but denies mechanical issues, inability to eat or feeling of choking. Denies hematemesis, hemoptysis.   (09 Nov 2022 12:31)      Hyponatremia  Likely SIADH  free water restriction <1L/day  Na dropped again today  check urine na and osmo  continue fluid restriction  monitor    Hypophosphatemia  resolved  supplement as needed  Check phosphorus daily.     Anemia  Defer to hematology    hypocalcemia  sec to low alb  vit d 1,25 ok  Monitor

## 2022-12-03 NOTE — PROGRESS NOTE ADULT - ASSESSMENT
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 Patient is admitted to be optimized for her surgical procedure.    Plan: OR tomorrow for RVATS, RUL Nodule Biopsy w/ IR Marking.    1: hx of lymphoma: DBLCL  2: Pleural effusion :  3: HLD:   '4: ANCA ASSOCIATED VASCULITIS      11/17:    1: hx of lymphoma: DBLCL: S/P SURGERY RUL wedge resection and LN biopsy : await results:   2: Pleural effusion : not much of chest xray  : but ct scan chest revelas more pleurl effusion on rt sdie then left:  send for chem and cultures   3: HLD: : per PMD  '4: ANCA ASSOCIATED VASCULITIS : sHE HAD FEVER:  RHEUMATOLOGY FOLLOWING ON ANTIBIOTICS:    DW THORACIC     11/18:    1: hx of lymphoma: DBLCL: S/P SURGERY RUL wedge resection and LN biopsy : await results:   2: Pleural effusion : not much of chest xray  : but ct scan chest reveals more pleural effusion on rt side then left: s/p thoracentesis : sent for cultures:  has exudative effusion ? malignancy ? lymphoma  3: HLD: : per PMD  '4: ANCA ASSOCIATED VASCULITIS : ssHE HAD FEVER:  RHEUMATOLOGY FOLLOWING ON ANTIBIOTICS: AFEBRILE IN LAST 24 HOURS AND IS ON CEFEPIME:     DW THORACIC AND ACP     11/20:      1: hx of lymphoma: DBLCL: S/P SURGERY RUL wedge resection and LN biopsy : await results:   2: Pleural effusion : not much of chest xray  : but ct scan chest reveals more pleural effusion on rt side then left: s/p thoracentesis : sent for cultures:  has exudative effusion ? malignancy ? lymphoma : await flow cyto   3: HLD: : per PMD  '4: ANCA ASSOCIATED VASCULITIS : ssHE HAD FEVER:  RHEUMATOLOGY FOLLOWING ON ANTIBIOTICS: AFEBRILE IN LAST 72 HOURS AND IS ON CEFEPIME:     DW  ACP     11/21:      1: hx of lymphoma: DBLCL: S/P SURGERY RUL wedge resection and LN biopsy : await results:   2: Pleural effusion : not much of chest xray  : but ct scan chest reveals more pleural effusion on rt side then left: s/p thoracentesis : sent for cultures:  has exudative effusion ? malignancy ? lymphoma : await flow cyto   3: HLD: : per PMD  '4: ANCA ASSOCIATED VASCULITIS : SHE HAD FEVER:  RHEUMATOLOGY FOLLOWING ON ANTIBIOTICS: AFEBRILE IN LAST 72 HOURS AND IS still off CEFEPIME: now  5: now with covid : on remdesivir:  : she is on  2 L of oxygen : but on record she did not desaturate too much      DW  ACP     11/22    1: hx of lymphoma: DBLCL: S/P SURGERY RUL wedge resection and LN biopsy : await results:   2: Pleural effusion : not much of chest xray  : but ct scan chest reveals more pleural effusion on rt side then left: s/p thoracentesis : sent for cultures:  has exudative effusion ? malignancy ? lymphoma : await flow cyto   3: HLD: : per PMD  '4: ANCA ASSOCIATED VASCULITIS : SHE HAD FEVER:  RHEUMATOLOGY FOLLOWING ON ANTIBIOTICS: AFEBRILE IN LAST 72 HOURS AND IS  off CEFEPIME: now  5: now with covid : on remdesivir:  : she is on  2 L of oxygen : but on record she did not desaturate too much  : she is on it for comfort:  demi acp : to remove oxygen and see her real o2 sao2: HER D DIMER NEEDS TO BE CHECKED    dw acp    11/23:      1: hx of lymphoma: DBLCL: S/P SURGERY RUL wedge resection and LN biopsy : await results:   2: Pleural effusion : not much of chest xray  : but ct scan chest reveals more pleural effusion on rt side then left: s/p thoracentesis : sent for cultures:  has exudative effusion ? malignancy ? lymphoma : await flow cyto   3: HLD: : per PMD  '4: ANCA ASSOCIATED VASCULITIS : SHE HAD FEVER:  RHEUMATOLOGY FOLLOWING ON ANTIBIOTICS: AFEBRILE IN LAST 72 HOURS AND IS  off CEFEPIME: now  5: now with covid : on remdesivir:  : she is on  2 L of oxygen : but on record she did not desaturate too much  : she is on it for comfort:  demi acp : to remove oxygen and see her real o2 sao2: HER D DIMER is high   ut she already had negative pe:     dw acp    11/24:    1: hx of lymphoma: DBLCL: S/P SURGERY RUL wedge resection and LN biopsy : await results:   2: Pleural effusion : not much of chest xray  : but ct scan chest reveals more pleural effusion on rt side then left: s/p thoracentesis : sent for cultures:  has exudative effusion ? malignancy ? lymphoma :   3: HLD: : per PMD  '4: ANCA ASSOCIATED VASCULITIS : SHE HAD FEVER:  RHEUMATOLOGY FOLLOWING ON ANTIBIOTICS: AFEBRILE IN LAST 72 HOURS AND IS  off CEFEPIME: now  5: now with covid : on remdesivir still   : she is on  2 L of oxygen : but on record she did not desaturate too much  : she is on it for comfort:  dw acp : to remove oxygen and see her real o2 sao2: HER D DIMER is high   but she already had negative pe: not on steroids per ID     dw acp    11/25:    1: hx of lymphoma: DBLCL: S/P SURGERY RUL wedge resection and LN biopsy : previous ebus biopsy was negative:  no granulomas reproted  2: Pleural effusion : not much of chest xray  : but ct scan chest reveals more pleural effusion on rt side then left: s/p thoracentesis : sent for cultures:  has exudative effusion ? malignancy ? lymphoma :   3: HLD: : per PMD  '4: ANCA ASSOCIATED VASCULITIS : SHE HAD FEVER:  RHEUMATOLOGY FOLLOWING ON ANTIBIOTICS: AFEBRILE IN LAST 72 HOURS AND IS  off CEFEPIME: now  5: now with covid : on remdesivir still   : she is on  2 L of oxygen : but on record she did not desaturate too much  : she is on it for comfort:  dw acp : to remove oxygen and see her real o2 sao2: HER D DIMER is high   but she already had negative pe: not on steroids per ID     dw acp    11/27:    1: hx of lymphoma: DBLCL: S/P SURGERY RUL wedge resection and LN biopsy : previous ebus biopsy was negative:  no granulomas reported  2: Pleural effusion : not much of chest xray  : but ct scan chest reveals more pleural effusion on rt side then left: s/p thoracentesis : sent for cultures:  has exudative effusion ? malignancy ? lymphoma :   3: HLD: : per PMD  '4: ANCA ASSOCIATED VASCULITIS : SHE HAD FEVER:  RHEUMATOLOGY FOLLOWING ON ANTIBIOTICS: AFEBRILE IN LAST 72 HOURS AND IS  off CEFEPIME: now  5: now with covid : on remdesivir still   :dexa started on for wheezing +    dw PMD    11/28:    1: hx of lymphoma: DBLCL: S/P SURGERY RUL wedge resection and LN biopsy : previous ebus biopsy was negative:  no granulomas reported  2: Pleural effusion : not much of chest xray  : but ct scan chest reveals more pleural effusion on rt side then left: s/p thoracentesis : sent for cultures:  has exudative effusion ? malignancy ? lymphoma :   3: HLD: : per PMD  '4: ANCA ASSOCIATED VASCULITIS : SHE HAD FEVER:  RHEUMATOLOGY FOLLOWING ON ANTIBIOTICS: AFEBRILE IN LAST 72 HOURS AND IS  off CEFEPIME: now :per rheum   5: now with covid : on remdesivir still   :and cont dexa for now : now not much of wheezing:     dw PMD    11/29:    1: Hx of lymphoma: DBLCL: S/P SURGERY RUL wedge resection and LN biopsy : previous ebus biopsy was negative:  no granulomas reported  2: Pleural effusion : not much of chest xray  : but ct scan chest reveals more pleural effusion on rt side then left: s/p thoracentesis : sent for cultures:  has exudative effusion ? malignancy ? lymphoma :   3: HLD: : per PMD  '4: ANCA ASSOCIATED VASCULITIS : SHE HAD FEVER:  RHEUMATOLOGY FOLLOWING ON ANTIBIOTICS: AFEBRILE IN LAST 72 HOURS AND IS  off CEFEPIME: now :per rheum : she is already on steroids:   5: now with covid : on remdesivir still   :and cont dexa for now  fr a totola of 10 days  : now not much of wheezing:     dw acp " she does feel better    11/30:      1: Hx of lymphoma: DBLCL: S/P SURGERY RUL wedge resection and LN biopsy : previous ebus biopsy was negative:  no granulomas reported  2: Pleural effusion : not much of chest xray  : but ct scan chest reveals more pleural effusion on rt side then left: s/p thoracentesis : sent for cultures:  has exudative effusion ? malignancy ? lymphoma :   3: HLD: : per PMD  '4: ANCA ASSOCIATED VASCULITIS : SHE HAD FEVER:  RHEUMATOLOGY FOLLOWING ON ANTIBIOTICS: AFEBRILE IN LAST 96 HOURS AND IS  off CEFEPIME: now :per rheum : she is already on steroids:   5: now with covid : on remdesivir still   :and cont dexa for now  fr a total of 10 days  : now not much of wheezing: : d dimer has decreased significantly     seems to be doing  ok : needs pt ot:  demi santoyo      12/1:      1: Hx of lymphoma: DBLCL: S/P SURGERY RUL wedge resection and LN biopsy : previous ebus biopsy was negative:  no granulomas reported  2: Pleural effusion : not much of chest xray  : but ct scan chest reveals more pleural effusion on rt side then left: s/p thoracentesis : sent for cultures:  has exudative effusion ? malignancy ? lymphoma :   3: HLD: : per PMD  '4: ANCA ASSOCIATED VASCULITIS : SHE HAD FEVER:  RHEUMATOLOGY FOLLOWING ON ANTIBIOTICS: AFEBRILE IN LAST 96 HOURS AND IS  off CEFEPIME: now :per rheum : she is already on steroids:  and will eventually be transitioned to prednisone one dexa course is finished she had minmal ptx on previous ct after surgery   5: now with covid : on remdesivir still   :and cont dexa for now  fr a total of 10 days  : now not much of wheezing: : d dimer has decreased significantly ; but today d diemr has lsihty increased:  but clinically she looks better and is on room air infact today     seems to be doing  ok : needs pt ot:  demi santoyo      12/2:    1: Hx of lymphoma: DBLCL: S/P SURGERY RUL wedge resection and LN biopsy : previous ebus biopsy was negative:  no granulomas reported  2: Pleural effusion : not much of chest xray  : but ct scan chest reveals more pleural effusion on rt side then left: s/p thoracentesis : sent for cultures:  has exudative effusion ? malignancy ? lymphoma :   3: HLD: : per PMD  '4: ANCA ASSOCIATED VASCULITIS : SHE HAD FEVER:  RHEUMATOLOGY FOLLOWING ON ANTIBIOTICS: AFEBRILE IN LAST 96 HOURS AND IS  off CEFEPIME: now :per rheum : she is already on steroids:  and will eventually be transitioned to prednisone one dexa course is finished she had minimal ptx on previous ct after surgery  : she has no sob:  no cough:   5: Now with covid : on remdesivir still   :and cont dexa for now  for a total of 10 days  : now not much of wheezing: : d dimer has decreased significantly ; but today d dimer has slightly increased:  but clinically she looks better and is on room air infact today     seems to be doing  ok : needs pt ot:  demi santoyo      12/3:    1: Hx of lymphoma: DBLCL: S/P SURGERY RUL wedge resection and LN biopsy : previous ebus biopsy was negative:  no granulomas reported  awaiting dc:   2: Pleural effusion : not much of chest xray  : but ct scan chest reveals more pleural effusion on rt side then left: s/p thoracentesis : sent for cultures:  has exudative effusion ? malignancy ? lymphoma :   3: HLD: : per PMD  '4: ANCA ASSOCIATED VASCULITIS : SHE HAD FEVER:  RHEUMATOLOGY FOLLOWING ON ANTIBIOTICS: AFEBRILE IN LAST 96 HOURS AND IS  off CEFEPIME: now :per rheum : she is already on steroids:  and will eventually be transitioned to prednisone one dexa course is finished she had minimal ptx on previous ct after surgery  : she has no sob:  no cough:   5: Now with covid : on remdesivir still   :and cont dexa for now  for a total of 10 days  : now not much of wheezing: : d dimer has decreased significantly ; but today d dimer has slightly increased:  but clinically she looks better and is on room air infact today : will be switched to po predniosne    seems to be doing  ok : needs pt ot:  demi santoyo

## 2022-12-03 NOTE — PROVIDER CONTACT NOTE (OTHER) - ASSESSMENT
A&Ox3, forgetful. Patient found sleeping in bed, no complaints. Vitals as per flowsheet. Currently on 3L while sleeping, O2 sat currently 96%
A&Ox3, forgetful. Patient found sleeping in bed, no complaints. Vitals as per flowsheet. Currently on 3L while sleeping, O2 sat currently 96%.
atrial tachycardia. . Nonsustained. Asymptomatic. /64. HR now 100.
Pt. denies any pain or discomfort. pt had 1 minute run of VT. VSS per flowsheet patient asymptomatic at time of event . No chest pain.
Pt. not in pain, sleeping in bed and comfortable. No signs of distress. on 2L NC.
patient a&ox4 in no acute distress hemodynamically stable cleared for rehab
Pt. denies any pain or discomfort. O2 Sat remains > 95%. VSS. No chest pain. Sips of water encouraged and HOB raised 60-90 degrees.
patient lying in bed, very tired today. VSS.
A&Ox2-3. Patient found sleeping in bed, no complaints. Vitals as per flowsheet
Pt. not in pain, NSR on tele monitor. VSS. Pt. sleeping and comfortable.

## 2022-12-03 NOTE — PROVIDER CONTACT NOTE (OTHER) - ACTION/TREATMENT ORDERED:
PA made aware
Provider concerned with sepsis, ordered a septic workup.
provider made aware. all safety maintained
Provider made aware Lasix administered.
Provider saw pt at the bedside. Reviewed tele strip. Will continue to monitor.
Manual BP taken of the right arm 170/70, no new orders given at this time.
No new orders at the moment. Will continue to monitor.
Provider made aware. No new orders at this time. Patient is to be kept awake.
Provider made aware no new orders
Guaifenesin 10 ml UD cup ordered. Administered to patient with no relief. Benzonatate 200 mg ordered afterwards and pt. refused medication. Will continue to encourage overnight.

## 2022-12-03 NOTE — PROGRESS NOTE ADULT - SUBJECTIVE AND OBJECTIVE BOX
Date of Service: 12-03-22 @ 14:33    Patient is a 81y old  Female who presents with a chief complaint of RVATS, RUL Nodule Biopsy w/ IR marking (03 Dec 2022 12:56)      Any change in ROS: she looks pretty good:  no sob:  no cough : no phlegm  : on room air:     MEDICATIONS  (STANDING):  acetaminophen   IVPB .. 750 milliGRAM(s) IV Intermittent once  albuterol    90 MICROgram(s) HFA Inhaler 2 Puff(s) Inhalation every 6 hours  budesonide 160 MICROgram(s)/formoterol 4.5 MICROgram(s) Inhaler 2 Puff(s) Inhalation two times a day  citalopram 10 milliGRAM(s) Oral daily  dexAMETHasone     Tablet 6 milliGRAM(s) Oral daily  enoxaparin Injectable 40 milliGRAM(s) SubCutaneous every 24 hours  fluticasone propionate 50 MICROgram(s)/spray Nasal Spray 1 Spray(s) Both Nostrils two times a day  folic acid 1 milliGRAM(s) Oral daily  guaiFENesin  milliGRAM(s) Oral every 12 hours  ipratropium 17 MICROgram(s) HFA Inhaler 2 Puff(s) Inhalation every 6 hours  levalbuterol 45 MICROgram(s) HFA Inhaler 2 Puff(s) Inhalation every 6 hours  lidocaine   4% Patch 1 Patch Transdermal daily  melatonin 3 milliGRAM(s) Oral at bedtime  metoprolol tartrate 50 milliGRAM(s) Oral two times a day  pantoprazole    Tablet 40 milliGRAM(s) Oral before breakfast  polyethylene glycol 3350 17 Gram(s) Oral daily  potassium chloride   Powder 40 milliEquivalent(s) Oral once  senna 1 Tablet(s) Oral daily  sodium bicarbonate 650 milliGRAM(s) Oral three times a day    MEDICATIONS  (PRN):  acetaminophen     Tablet .. 650 milliGRAM(s) Oral every 6 hours PRN Mild Pain (1 - 3)  benzonatate 200 milliGRAM(s) Oral three times a day PRN Cough  naloxone Injectable 0.1 milliGRAM(s) IV Push every 3 minutes PRN For ANY of the following changes in patient status:  A. RR LESS THAN 10 breaths per minute, B. Oxygen saturation LESS THAN 90%, C. Sedation score of 6  ondansetron Injectable 4 milliGRAM(s) IV Push every 6 hours PRN Nausea    Vital Signs Last 24 Hrs  T(C): 36.6 (03 Dec 2022 12:51), Max: 36.7 (02 Dec 2022 20:27)  T(F): 97.9 (03 Dec 2022 12:51), Max: 98 (02 Dec 2022 20:27)  HR: 75 (03 Dec 2022 12:51) (58 - 81)  BP: 134/51 (03 Dec 2022 12:51) (121/52 - 153/63)  BP(mean): --  RR: 17 (03 Dec 2022 12:51) (16 - 17)  SpO2: 97% (03 Dec 2022 12:51) (97% - 100%)    Parameters below as of 03 Dec 2022 12:51  Patient On (Oxygen Delivery Method): room air        I&O's Summary        Physical Exam:   GENERAL: NAD, well-groomed, well-developed  HEENT: RANJIT/   Atraumatic, Normocephalic  ENMT: No tonsillar erythema, exudates, or enlargement; Moist mucous membranes, Good dentition, No lesions  NECK: Supple, No JVD, Normal thyroid  CHEST/LUNG: Clear to auscultaion  CVS: Regular rate and rhythm; No murmurs, rubs, or gallops  GI: : Soft, Nontender, Nondistended; Bowel sounds present  NERVOUS SYSTEM:  Alert & Oriented X3  EXTREMITIES:-edema  LYMPH: No lymphadenopathy noted  SKIN: No rashes or lesions  ENDOCRINOLOGY: No Thyromegaly  PSYCH: Appropriate    Labs:                              9.8    11.69 )-----------( 233      ( 03 Dec 2022 06:36 )             31.0                         8.9    10.89 )-----------( 247      ( 02 Dec 2022 04:20 )             28.9                         8.6    8.32  )-----------( 265      ( 01 Dec 2022 05:45 )             27.6                         8.4    9.61  )-----------( 297      ( 30 Nov 2022 07:05 )             27.1     12-03    131<L>  |  99  |  17  ----------------------------<  100<H>  3.4<L>   |  23  |  0.45<L>  12-02    132<L>  |  98  |  18  ----------------------------<  83  3.8   |  25  |  0.44<L>  12-01    137  |  101  |  19  ----------------------------<  98  4.0   |  27  |  0.45<L>  11-30    139  |  102  |  23  ----------------------------<  110<H>  3.7   |  27  |  0.46<L>    Ca    8.4      03 Dec 2022 06:36  Ca    8.1<L>      02 Dec 2022 04:20  Phos  2.9     12-03  Mg     1.80     12-03    TPro  5.6<L>  /  Alb  3.0<L>  /  TBili  0.8  /  DBili  x   /  AST  16  /  ALT  8   /  AlkPhos  60  12-02  TPro  5.5<L>  /  Alb  2.8<L>  /  TBili  0.6  /  DBili  x   /  AST  14  /  ALT  8   /  AlkPhos  55  12-01  TPro  5.5<L>  /  Alb  2.7<L>  /  TBili  0.5  /  DBili  x   /  AST  15  /  ALT  6   /  AlkPhos  51  11-30    CAPILLARY BLOOD GLUCOSE          LIVER FUNCTIONS - ( 02 Dec 2022 04:20 )  Alb: 3.0 g/dL / Pro: 5.6 g/dL / ALK PHOS: 60 U/L / ALT: 8 U/L / AST: 16 U/L / GGT: x               D-Dimer Assay, Quantitative: 1291 ng/mL DDU (12-01 @ 05:45)  D-Dimer Assay, Quantitative: 1111 ng/mL DDU (11-29 @ 06:00)  D-Dimer Assay, Quantitative: 2028 ng/mL DDU (11-28 @ 06:09)  D-Dimer Assay, Quantitative: 1867 ng/mL DDU (11-27 @ 06:54)      rad< from: Xray Chest 1 View- PORTABLE-Routine (Xray Chest 1 View- PORTABLE-Routine .) (11.26.22 @ 18:48) >  ACC: [id number] EXAM:  XR CHEST PORTABLE ROUTINE 1V                          PROCEDURE DATE:  11/26/2022          INTERPRETATION:  Chest one view    HISTORY: Covid-19    COMPARISON STUDY: 11/22/2022    Frontal expiratory view of the chest shows the heart to be similar in   size. Left chest port is again noted.    The lungs show similar patchy infiltrates with small pleural effusions   and there is no evidence of pneumothorax.    IMPRESSION:  Similar patchy infiltrates.        Thank you for the courtesy of this referral.    --- End of Report ---            ALEXA ZABALA MD; Attending Interventional Radiologist  This document has been electronically signed. Nov 27 2022  3:59PM    < end of copied text >    RECENT CULTURES:        RESPIRATORY CULTURES:          Studies  Chest X-RAY  CT SCAN Chest   Venous Dopplers: LE:   CT Abdomen  Others

## 2022-12-03 NOTE — PROGRESS NOTE ADULT - ASSESSMENT
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 11/29  -LASIX IV as needed    #covid   -tx per med/pulmo    #anemia   -prbc's per med    #R/o vasculitis   -w/u per primary team     35 minutes spent on total encounter; more than 50% of the visit was spent counseling and/or coordinating care by the attending physician.

## 2022-12-03 NOTE — PROVIDER CONTACT NOTE (OTHER) - SITUATION
pt had 1 minute run of VT. VSS per flowsheet patient asymptomatic at time of event
Patient bradycardic to 43 while sleeping, nonsustaining. RN made aware 1830 by teletech Patient was woken up to receive 1800 medications & had already received metoprolol
patient FTT, weak and tired. Refusing to eat much today. Very poor appetite
patient bradycardic to 45 nonsustaining while sleeping
patient desat to 79% nonsustaining with good pleth for <1 minute
patient refusing potassium supplement
Pt. HR increased to 120s and sustained, then remained at 115 after.
Pt. has frequent, nonstop cough.
Pt. BP increased to 152/85 on the left arm and 180/70 on the right arm.
atrial tachycardia. . Nonsustained Asymptomatic

## 2022-12-03 NOTE — PROGRESS NOTE ADULT - SUBJECTIVE AND OBJECTIVE BOX
[facility] [place] [facility]   MD RONEL FRIAS MD KRISTINE SOLTANPOUR, DO ANGELA WONG, PA    TEL:  OFFICE: [phone]  From 5pm-7am Answering Service [phone]    -- RENAL FOLLOW UP NOTE ---Date of Service 12-03-22 @ 20:12    Patient is a 81y old  Female who presents with a chief complaint of RVATS, RUL Nodule Biopsy w/ IR marking (03 Dec 2022 14:33)      Patient seen and examined at bedside. No chest pain/sob    VITALS:  T(F): 97.9 (12-03-22 @ 12:51), Max: 98 (12-02-22 @ 20:27)  HR: 75 (12-03-22 @ 12:51)  BP: 134/51 (12-03-22 @ 12:51)  RR: 17 (12-03-22 @ 12:51)  SpO2: 97% (12-03-22 @ 12:51)  Wt(kg): --        PHYSICAL EXAM:  General: NAD  Neck: No JVD  Respiratory: CTAB, no wheezes, rales or rhonchi  Cardiovascular: S1, S2, RRR  Gastrointestinal: BS+, soft, NT/ND  Extremities: No peripheral edema    Hospital Medications:   MEDICATIONS  (STANDING):  acetaminophen   IVPB .. 750 milliGRAM(s) IV Intermittent once  albuterol    90 MICROgram(s) HFA Inhaler 2 Puff(s) Inhalation every 6 hours  budesonide 160 MICROgram(s)/formoterol 4.5 MICROgram(s) Inhaler 2 Puff(s) Inhalation two times a day  citalopram 10 milliGRAM(s) Oral daily  dexAMETHasone     Tablet 6 milliGRAM(s) Oral daily  enoxaparin Injectable 40 milliGRAM(s) SubCutaneous every 24 hours  fluticasone propionate 50 MICROgram(s)/spray Nasal Spray 1 Spray(s) Both Nostrils two times a day  folic acid 1 milliGRAM(s) Oral daily  guaiFENesin  milliGRAM(s) Oral every 12 hours  ipratropium 17 MICROgram(s) HFA Inhaler 2 Puff(s) Inhalation every 6 hours  levalbuterol 45 MICROgram(s) HFA Inhaler 2 Puff(s) Inhalation every 6 hours  lidocaine   4% Patch 1 Patch Transdermal daily  melatonin 3 milliGRAM(s) Oral at bedtime  metoprolol tartrate 50 milliGRAM(s) Oral two times a day  pantoprazole    Tablet 40 milliGRAM(s) Oral before breakfast  polyethylene glycol 3350 17 Gram(s) Oral daily  potassium chloride   Powder 40 milliEquivalent(s) Oral once  senna 1 Tablet(s) Oral daily  sodium bicarbonate 650 milliGRAM(s) Oral three times a day      LABS:  12-03    131<L>  |  99  |  17  ----------------------------<  100<H>  3.4<L>   |  23  |  0.45<L>    Ca    8.4      03 Dec 2022 06:36  Phos  2.9     12-03  Mg     1.80     12-03    TPro  5.6<L>  /  Alb  3.0<L>  /  TBili  0.8  /  DBili      /  AST  16  /  ALT  8   /  AlkPhos  60  12-02    Creatinine Trend: 0.45 <--, 0.44 <--, 0.45 <--, 0.46 <--, 0.49 <--, 0.52 <--, 0.48 <--    Phosphorus Level, Serum: 2.9 mg/dL (12-03 @ 06:36)                              9.8    11.69 )-----------( 233      ( 03 Dec 2022 06:36 )             31.0     Urine Studies:  Urinalysis - [11-17-22 @ 20:04]      Color Judy / Appearance Clear / SG 1.044 / pH 6.5      Gluc Negative / Ketone Small  / Bili Small / Urobili >12 mg/dL       Blood Small / Protein 100 mg/dL / Leuk Est Negative / Nitrite Negative      RBC 12 / WBC 11 / Hyaline 9 / Gran  / Sq Epi  / Non Sq Epi 11 / Bacteria Few    Urine Sodium 227      [12-02-22 @ 17:07]    Iron 97, TIBC <127, %sat --      [11-10-22 @ 06:55]  Ferritin 1570      [11-25-22 @ 06:00]  Vitamin D (25OH) 38.3      [11-15-22 @ 06:47]  Lipid: chol 84, TG 81, HDL 22, LDL --      [10-01-22 @ 07:10]    HBsAb Nonreact      [11-11-22 @ 04:15]  HBsAg Nonreact      [11-11-22 @ 04:15]  HBcAb Nonreact      [11-11-22 @ 04:15]  HCV 0.10, Nonreact      [11-15-22 @ 06:47]    ANCA: cANCA Negative, pANCA Negative, atypical ANCA Indeterminate Method interference due to CATHERINE Fluorescence      [11-10-22 @ 06:55]  MPO-ANCA <5.0, interpretation: Negative      [11-10-22 @ 06:55]  PR3-ANCA <5.0, interpretation: Negative      [11-10-22 @ 06:55]    RADIOLOGY & ADDITIONAL STUDIES:

## 2022-12-03 NOTE — PROGRESS NOTE ADULT - PROBLEM SELECTOR PROBLEM 1
Lung nodules

## 2022-12-03 NOTE — PROVIDER CONTACT NOTE (OTHER) - BACKGROUND
80 Y/O f with lung cancer, HLD, Gerd, DVT, anxiety & depression
Pt. had a RVATS.
Pt. s/p RVATS.
82 Y/O f with lung cancer, HLD, Gerd
S/P VATS RUL wedge
Pt. had a RVATS.
80 Y/O f with lung cancer, HLD, Gerd, DVT, anxiety & depression; patient does bradycardia at times while sleeping.
82 Y/O f with lung cancer, HLD, Gerd, DVT, anxiety & depression
patient admitted for covid 19 and LVATS procedure
Pt. s/p RVATS.

## 2022-12-04 NOTE — PROGRESS NOTE ADULT - SUBJECTIVE AND OBJECTIVE BOX
CARDIOLOGY FOLLOW UP - Dr. Hooker  Date of Service: 12/4/2022  CC: no events    Review of Systems:  Constitutional: No fever, weight loss, or fatigue  Respiratory: No cough, wheezing, or hemoptysis, no shortness of breath  Cardiovascular: No chest pain, palpitations, passing out, dizziness, or leg swelling  Gastrointestinal: No abd or epigastric pain. No nausea, vomiting, or hematemesis; no diarrhea or consiptaiton, no melena or hematochezia  Vascular: No edema     TELEMETRY:    PHYSICAL EXAM:  T(C): 36.5 (12-04-22 @ 05:30), Max: 36.7 (12-03-22 @ 21:33)  HR: 63 (12-04-22 @ 05:30) (61 - 75)  BP: 137/50 (12-04-22 @ 05:30) (134/51 - 140/46)  RR: 16 (12-04-22 @ 05:30) (16 - 18)  SpO2: 96% (12-04-22 @ 05:30) (95% - 97%)  Wt(kg): --  I&O's Summary      Appearance: Normal	  Cardiovascular: Normal S1 S2,RRR, No JVD, No murmurs  Respiratory: Lungs clear to auscultation	  Gastrointestinal:  Soft, Non-tender, + BS	  Extremities: Normal range of motion, No clubbing, cyanosis or edema  Vascular: Peripheral pulses palpable 2+ bilaterally       Home Medications:  acetaminophen 325 mg oral tablet: 2 tab(s) orally every 6 hours, As needed, Mild Pain (1 - 3) (01 Dec 2022 13:22)  albuterol 90 mcg/inh inhalation aerosol: 2 puff(s) inhaled every 6 hours (02 Dec 2022 11:18)  budesonide-formoterol 160 mcg-4.5 mcg/inh inhalation aerosol: 2 puff(s) inhaled 2 times a day (02 Dec 2022 11:18)  citalopram 10 mg oral tablet: 1 tab(s) orally once a day (04 Nov 2022 15:16)  dexamethasone 6 mg oral tablet: 1 tab(s) orally once a day (02 Dec 2022 11:14)  enoxaparin: 40 milligram(s) subcutaneous once a day (02 Dec 2022 11:25)  ezetimibe 10 mg oral tablet: 1 tab(s) orally once a day (04 Nov 2022 15:16)  folic acid 1 mg oral tablet: 1 tab(s) orally once a day (04 Nov 2022 15:16)  guaiFENesin 600 mg oral tablet, extended release: 1 tab(s) orally every 12 hours (02 Dec 2022 11:18)  ipratropium CFC free 17 mcg/inh inhalation aerosol: 2 puff(s) inhaled every 6 hours (01 Dec 2022 13:22)  levalbuterol 45 mcg/inh inhalation aerosol: 2 puff(s) inhaled every 6 hours (01 Dec 2022 13:22)  melatonin 3 mg oral tablet: 1 tab(s) orally once a day (at bedtime) (01 Dec 2022 13:22)  metoprolol tartrate 50 mg oral tablet: 1 tab(s) orally 2 times a day (01 Dec 2022 13:22)  omeprazole 20 mg oral delayed release capsule: 1 cap(s) orally prn (04 Nov 2022 15:16)  polyethylene glycol 3350 oral powder for reconstitution: 17 gram(s) orally once a day (02 Dec 2022 11:18)  senna leaf extract oral tablet: 1 tab(s) orally once a day (02 Dec 2022 11:18)  sodium bicarbonate 650 mg oral tablet: 1 tab(s) orally 3 times a day (02 Dec 2022 11:18)        MEDICATIONS  (STANDING):  acetaminophen   IVPB .. 750 milliGRAM(s) IV Intermittent once  albuterol    90 MICROgram(s) HFA Inhaler 2 Puff(s) Inhalation every 6 hours  budesonide 160 MICROgram(s)/formoterol 4.5 MICROgram(s) Inhaler 2 Puff(s) Inhalation two times a day  citalopram 10 milliGRAM(s) Oral daily  dexAMETHasone     Tablet 6 milliGRAM(s) Oral daily  enoxaparin Injectable 40 milliGRAM(s) SubCutaneous every 24 hours  fluticasone propionate 50 MICROgram(s)/spray Nasal Spray 1 Spray(s) Both Nostrils two times a day  folic acid 1 milliGRAM(s) Oral daily  guaiFENesin  milliGRAM(s) Oral every 12 hours  ipratropium 17 MICROgram(s) HFA Inhaler 2 Puff(s) Inhalation every 6 hours  levalbuterol 45 MICROgram(s) HFA Inhaler 2 Puff(s) Inhalation every 6 hours  lidocaine   4% Patch 1 Patch Transdermal daily  melatonin 3 milliGRAM(s) Oral at bedtime  metoprolol tartrate 50 milliGRAM(s) Oral two times a day  pantoprazole    Tablet 40 milliGRAM(s) Oral before breakfast  polyethylene glycol 3350 17 Gram(s) Oral daily  potassium chloride   Powder 40 milliEquivalent(s) Oral once  senna 1 Tablet(s) Oral daily  sodium bicarbonate 650 milliGRAM(s) Oral three times a day        EKG:  RADIOLOGY:  DIAGNOSTIC TESTING:  [ ] Echocardiogram:  [ ] Catherterization:  [ ] Stress Test:  OTHER:     LABS:	 	                          9.3    11.62 )-----------( 206      ( 04 Dec 2022 06:00 )             29.7     12-04    133<L>  |  96<L>  |  17  ----------------------------<  75  3.7   |  23  |  0.41<L>    Ca    8.1<L>      04 Dec 2022 06:00  Phos  2.5     12-04  Mg     1.80     12-04            CARDIAC MARKERS:

## 2022-12-04 NOTE — PROGRESS NOTE ADULT - ASSESSMENT
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 Patient states that recently she has been feeling very fatigued. She states that moving around her house, or even to the bathroom, has been causing her to get very tired and causes her some minor chest pain, which resolves quickly with rest. She states that her breathing has been non-labored, denies dyspnea, shortness of breath, inability to catch her breath. She states that she has also not had much of an appetite over the last month and has been eating mostly soft foods. She admits to about a 5lb weight loss over the last month. She admits to a minor cough that occasionally occurs in fits and makes her feel as if she may vomit. Patient admits to some nausea and aversion to food but denies mechanical issues, inability to eat or feeling of choking. Denies hematemesis, hemoptysis.   (09 Nov 2022 12:31)      Hyponatremia  Likely SIADH  free water restriction <1L/day  Na dropped again today  continue fluid restriction  monitor  DC sodium bicarb.     Hypophosphatemia  resolved  supplement as needed  Check phosphorus daily.     Anemia  Defer to hematology    hypocalcemia  sec to low alb  vit d 1,25 ok  Monitor

## 2022-12-04 NOTE — PROGRESS NOTE ADULT - ASSESSMENT
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 Patient states that recently she has been feeling very fatigued.    Fatigue/dyspnea, with hx of Lymphoma  - Recent TTE on 11/3/22 reviewed: normal LV. outpt card Dr. Ady Cooper  - PT also saw pulm Mack Tucker in the office, with some concern for her overall status. She was hypotensive to systolic 90s in the office.  (now BP improves s/p IVF here).   - s/p thoracoscopic biopsy of mediastinal lymph node and Lung wedge resection 11/10/22  - path results favoring vasculitis, but final stains to r/o infection are still pending; no evidence for lymphoma. Cytology also came back negative.   - 11/22/22 a pt felt more SOB early morning hours, placed on 2LNCO2, CXR  some moderate interstitial edema. Given IV Lasix 20 mg x 1. improved.   - comfortable on nasal canula, better post diuretic. wean O2 as tolerated   - appreciate rheum, heme-onc f/u  - no plan for immunosuppressants such as cellcept while being treated for COVID19    Wheezing on 11/26  - comfortable on nasal canula  - started Decadron 6 mg qdaily IV for covid. After completion of 10 days course, can be switched to Prednisone 20 mg per rheum. then f/u outpt at rheum clinic.   - repeat CXR stable.  - appreciate rheum and pulm   - s/p Lasix 20 mg x 1 as needed    Fever: either 2' ANCA-mediated vasculitis vs infection  - UA, CXR unimpressive. RVP neg.   - no leukocytosis, remain stable clinically  - procal low.  - LE venous dopplers (-) for DVT  - s/p rt thoracentesis 11/17/22  - cefepime started 11/18/22 following worsened leukocytosis 11/18/22   - blood cxs 11/18/22 --> (-)  - cefepime stopped 11/22/22  - ID appreciated  - After completion of 10 days course of decadron, can be switched to Prednisone 20 mg per rheum    (+) COVID-19 11/21/22  - Remdesivir 11/21/22-->11/23/22  - steroid initiation per rheumatology service and ID.    - monitoring inflammatory markers  - ferritin, CRP downtrending  - d-dimer uptrending, Hep SQ BID switch to Lovenox SQ    - now downtrending    Rt pleural effusion  - CTA chest 11/16/22 revealed moderate rt effusion  - s/p 650 cc U/S-guided rt thoracentesis 11/17/22  - exudative but no neutrophilic predominance and initial Gram stain w/o organisms  - cultures neg.   - ID/ pulmonology consult appreciated  - monitoring off abx     Tachycardia likely 2' fever  - cont low-dose metoprolol  - card consulted (Dr. Hooker) appreciated    Anemia, unspecified  - hgb 8.1 lower than her baseline.  - no melena, no hematochezia. no BRBPR.   - recent Anemia work-up reviewed: normal vit b12, folate.  - repeat iron studies ordered    - s/p 2 units pRBC 11/1/22 per heme's note.   - s/p 1 unit pRBC 11/9/22 with appropriate post transfusion hgb.    - no melena, no hematochezia. no BRBPR.   - 11/21/22 --> s/p another unit of pRBC  - Lasix 20 mg PRN     Anxiety and depression  - stable, continue citalopram.  - Pt denied SI/HI ideations, denied visual and auditory hallucinations.     GERD (gastroesophageal reflux disease)  - continue PPI    Hyperlipidemia.   - continue home ezetimibe. okay to hold if nonformulary   - Lipid panel    DVT ppx   - SC heparin --> Lovenox SQ    Disposition  - Medically cleared for STR

## 2022-12-04 NOTE — PROGRESS NOTE ADULT - PROVIDER SPECIALTY LIST ADULT
Cardiology
Cardiology
Critical Care
Heme/Onc
Infectious Disease
Infectious Disease
Internal Medicine
Nephrology
Pain Medicine
CT Surgery
Cardiology
Critical Care
Infectious Disease
Internal Medicine
Nephrology
Pain Medicine
Pulmonology
Pulmonology
Cardiology
Heme/Onc
Infectious Disease
Internal Medicine
Nephrology
Pulmonology
Thoracic Surgery
Cardiology
Heme/Onc
Infectious Disease
Internal Medicine
Nephrology
Pulmonology
Nephrology
Pulmonology
Internal Medicine
Pulmonology

## 2022-12-04 NOTE — PROGRESS NOTE ADULT - NUTRITIONAL ASSESSMENT
This patient has been assessed with a concern for Malnutrition and has been determined to have a diagnosis/diagnoses of Severe protein-calorie malnutrition.    This patient is being managed with:   Diet Regular-  1500mL Fluid Restriction ([id number])  Supplement Feeding Modality:  Oral  Ensure Enlive Cans or Servings Per Day:  1       Frequency:  Two Times a day  Entered: Nov 20 2022  6:01PM    
This patient has been assessed with a concern for Malnutrition and has been determined to have a diagnosis/diagnoses of Severe protein-calorie malnutrition.    This patient is being managed with:   Diet Regular-  1500mL Fluid Restriction ([id number])  Supplement Feeding Modality:  Oral  Ensure Enlive Cans or Servings Per Day:  1       Frequency:  Two Times a day  Entered: Nov 20 2022  6:01PM    
This patient has been assessed with a concern for Malnutrition and has been determined to have a diagnosis/diagnoses of Severe protein-calorie malnutrition.    This patient is being managed with:   Diet Regular-  1500mL Fluid Restriction ([id number])  Supplement Feeding Modality:  Oral  Ensure Enlive Cans or Servings Per Day:  1       Frequency:  Two Times a day  Entered: Nov 20 2022  6:01PM    
This patient has been assessed with a concern for Malnutrition and has been determined to have a diagnosis/diagnoses of Severe protein-calorie malnutrition.    This patient is being managed with:   Diet Regular-  1500mL Fluid Restriction ([id number])  Supplement Feeding Modality:  Oral  Ensure Enlive Cans or Servings Per Day:  1       Frequency:  Two Times a day  Entered: Nov 20 2022  6:01PM    
This patient has been assessed with a concern for Malnutrition and has been determined to have a diagnosis/diagnoses of Severe protein-calorie malnutrition.    This patient is being managed with:   Diet Regular-  1500mL Fluid Restriction ([id number])  Supplement Feeding Modality:  Oral  Ensure Enlive Cans or Servings Per Day:  1       Frequency:  Two Times a day  Entered: Nov 20 2022  6:01PM    
This patient has been assessed with a concern for Malnutrition and has been determined to have a diagnosis/diagnoses of Severe protein-calorie malnutrition.    This patient is being managed with:   Diet Regular-  1500mL Fluid Restriction ([id number])  Supplement Feeding Modality:  Oral  Ensure Enlive Cans or Servings Per Day:  1       Frequency:  Two Times a day  Entered: Nov 20 2022  6:01PM    
This patient has been assessed with a concern for Malnutrition and has been determined to have a diagnosis/diagnoses of Severe protein-calorie malnutrition.    This patient is being managed with:   Diet Regular-  1000mL Fluid Restriction ([id number])  Supplement Feeding Modality:  Oral  Ensure Clear Cans or Servings Per Day:  1       Frequency:  Three Times a day  Entered: Dec  2 2022  4:41PM    
This patient has been assessed with a concern for Malnutrition and has been determined to have a diagnosis/diagnoses of Severe protein-calorie malnutrition.    This patient is being managed with:   Diet Regular-  1000mL Fluid Restriction ([id number])  Supplement Feeding Modality:  Oral  Ensure Clear Cans or Servings Per Day:  1       Frequency:  Three Times a day  Entered: Dec  2 2022  4:41PM    
This patient has been assessed with a concern for Malnutrition and has been determined to have a diagnosis/diagnoses of Severe protein-calorie malnutrition.    This patient is being managed with:   Diet Regular-  1500mL Fluid Restriction ([id number])  Supplement Feeding Modality:  Oral  Ensure Clear Cans or Servings Per Day:  1       Frequency:  Three Times a day  Entered: Nov 28 2022  4:58PM    
This patient has been assessed with a concern for Malnutrition and has been determined to have a diagnosis/diagnoses of Severe protein-calorie malnutrition.    This patient is being managed with:   Diet Regular-  1500mL Fluid Restriction ([id number])  Supplement Feeding Modality:  Oral  Ensure Clear Cans or Servings Per Day:  1       Frequency:  Three Times a day  Entered: Nov 28 2022  4:58PM    
This patient has been assessed with a concern for Malnutrition and has been determined to have a diagnosis/diagnoses of Severe protein-calorie malnutrition.    This patient is being managed with:   Diet Regular-  1500mL Fluid Restriction ([id number])  Supplement Feeding Modality:  Oral  Ensure Clear Cans or Servings Per Day:  1       Frequency:  Three Times a day  Entered: Nov 28 2022  4:58PM    
This patient has been assessed with a concern for Malnutrition and has been determined to have a diagnosis/diagnoses of Severe protein-calorie malnutrition.    This patient is being managed with:   Diet Regular-  1500mL Fluid Restriction ([id number])  Supplement Feeding Modality:  Oral  Ensure Clear Cans or Servings Per Day:  1       Frequency:  Three Times a day  Entered: Nov 28 2022  4:58PM    
This patient has been assessed with a concern for Malnutrition and has been determined to have a diagnosis/diagnoses of Severe protein-calorie malnutrition.    This patient is being managed with:   Diet Regular-  1500mL Fluid Restriction ([id number])  Supplement Feeding Modality:  Oral  Ensure Enlive Cans or Servings Per Day:  1       Frequency:  Two Times a day  Entered: Nov 20 2022  6:01PM    
This patient has been assessed with a concern for Malnutrition and has been determined to have a diagnosis/diagnoses of Severe protein-calorie malnutrition.    This patient is being managed with:   Diet Regular-  1500mL Fluid Restriction ([id number])  Supplement Feeding Modality:  Oral  Ensure Enlive Cans or Servings Per Day:  1       Frequency:  Two Times a day  Entered: Nov 20 2022  6:01PM    
This patient has been assessed with a concern for Malnutrition and has been determined to have a diagnosis/diagnoses of Severe protein-calorie malnutrition.    This patient is being managed with:   Diet Regular-  1500mL Fluid Restriction ([id number])  Supplement Feeding Modality:  Oral  Ensure Enlive Cans or Servings Per Day:  1       Frequency:  Two Times a day  Entered: Nov 20 2022  6:01PM    
This patient has been assessed with a concern for Malnutrition and has been determined to have a diagnosis/diagnoses of Severe protein-calorie malnutrition.    This patient is being managed with:   Diet Regular-  1500mL Fluid Restriction ([id number])  Supplement Feeding Modality:  Oral  Ensure Enlive Cans or Servings Per Day:  1       Frequency:  Two Times a day  Entered: Nov 20 2022  6:01PM    
This patient has been assessed with a concern for Malnutrition and has been determined to have a diagnosis/diagnoses of Severe protein-calorie malnutrition.    This patient is being managed with:   Diet Regular-  1500mL Fluid Restriction ([id number])  Supplement Feeding Modality:  Oral  Ensure Enlive Cans or Servings Per Day:  1       Frequency:  Two Times a day  Entered: Nov 20 2022  6:01PM    
This patient has been assessed with a concern for Malnutrition and has been determined to have a diagnosis/diagnoses of Severe protein-calorie malnutrition.    This patient is being managed with:   Diet Regular-  1500mL Fluid Restriction ([id number])  Supplement Feeding Modality:  Oral  Ensure Enlive Cans or Servings Per Day:  1       Frequency:  Two Times a day  Entered: Nov 20 2022  6:01PM

## 2022-12-04 NOTE — PROGRESS NOTE ADULT - SUBJECTIVE AND OBJECTIVE BOX
(-) fevers overnight  Saturating high 90s on room air    Vital Signs Last 24 Hrs  T(C): 36.5 (04 Dec 2022 05:30), Max: 36.7 (03 Dec 2022 21:33)  T(F): 97.7 (04 Dec 2022 05:30), Max: 98 (03 Dec 2022 21:33)  HR: 63 (04 Dec 2022 05:30) (61 - 75)  BP: 137/50 (04 Dec 2022 05:30) (134/51 - 140/46)  BP(mean): --  RR: 16 (04 Dec 2022 05:30) (16 - 18)  SpO2: 96% (04 Dec 2022 05:30) (95% - 97%)    I&O's Summary        PHYSICAL EXAM:  GENERAL: NAD, thin-elderly, comfortable  HEAD:  Atraumatic, Normocephalic  EYES: EOMI, PERRLA, conjunctiva and sclera clear  NECK: Supple, No JVD  CHEST/LUNG: mild decrease breath sounds bilaterally @ bases; no rales or wheezes  HEART: Regular rate and rhythm; No murmurs, rubs, or gallops  ABDOMEN: Soft, Nontender, Nondistended; Bowel sounds present  Neuro: AAOx3, no focal weakness   EXTREMITIES:  2+ Peripheral Pulses, No clubbing, cyanosis, trace edema    LABS:                        9.3    11.62 )-----------( 206      ( 04 Dec 2022 06:00 )             29.7     12-04    133<L>  |  96<L>  |  17  ----------------------------<  75  3.7   |  23  |  0.41<L>    Ca    8.1<L>      04 Dec 2022 06:00  Phos  2.5     12-04  Mg     1.80     12-04        CAPILLARY BLOOD GLUCOSE                RADIOLOGY & ADDITIONAL TESTS:    Imaging Personally Reviewed:  [x] YES  [ ] NO    Will obtain old records:  [ ] YES  [x] NO

## 2022-12-04 NOTE — PROGRESS NOTE ADULT - SUBJECTIVE AND OBJECTIVE BOX
[facility]   MD RONEL FRIAS MD KRISTINE SOLTANPOUR, DO ANGELA WONG, PA    TEL:  OFFICE: [phone]  From 5pm-7am Answering Service [phone]    -- RENAL FOLLOW UP NOTE ---Date of Service 12-04-22 @ 16:23    Patient is a 81y old  Female who presents with a chief complaint of RVATS, RUL Nodule Biopsy w/ IR marking (04 Dec 2022 11:03)      Patient seen and examined at bedside. No chest pain/sob    VITALS:  T(F): 98.8 (12-04-22 @ 12:08), Max: 98.8 (12-04-22 @ 12:08)  HR: 61 (12-04-22 @ 12:08)  BP: 131/63 (12-04-22 @ 12:08)  RR: 16 (12-04-22 @ 12:08)  SpO2: 96% (12-04-22 @ 12:08)  Wt(kg): --        PHYSICAL EXAM:  General: NAD  Neck: No JVD  Respiratory: CTAB, no wheezes, rales or rhonchi  Cardiovascular: S1, S2, RRR  Gastrointestinal: BS+, soft, NT/ND  Extremities: No peripheral edema    Hospital Medications:   MEDICATIONS  (STANDING):  acetaminophen   IVPB .. 750 milliGRAM(s) IV Intermittent once  albuterol    90 MICROgram(s) HFA Inhaler 2 Puff(s) Inhalation every 6 hours  budesonide 160 MICROgram(s)/formoterol 4.5 MICROgram(s) Inhaler 2 Puff(s) Inhalation two times a day  citalopram 10 milliGRAM(s) Oral daily  dexAMETHasone     Tablet 6 milliGRAM(s) Oral daily  enoxaparin Injectable 40 milliGRAM(s) SubCutaneous every 24 hours  fluticasone propionate 50 MICROgram(s)/spray Nasal Spray 1 Spray(s) Both Nostrils two times a day  folic acid 1 milliGRAM(s) Oral daily  guaiFENesin  milliGRAM(s) Oral every 12 hours  ipratropium 17 MICROgram(s) HFA Inhaler 2 Puff(s) Inhalation every 6 hours  levalbuterol 45 MICROgram(s) HFA Inhaler 2 Puff(s) Inhalation every 6 hours  lidocaine   4% Patch 1 Patch Transdermal daily  melatonin 3 milliGRAM(s) Oral at bedtime  metoprolol tartrate 50 milliGRAM(s) Oral two times a day  pantoprazole    Tablet 40 milliGRAM(s) Oral before breakfast  polyethylene glycol 3350 17 Gram(s) Oral daily  potassium chloride   Powder 40 milliEquivalent(s) Oral once  senna 1 Tablet(s) Oral daily  sodium bicarbonate 650 milliGRAM(s) Oral three times a day      LABS:  12-04    133<L>  |  96<L>  |  17  ----------------------------<  75  3.7   |  23  |  0.41<L>    Ca    8.1<L>      04 Dec 2022 06:00  Phos  2.5     12-04  Mg     1.80     12-04      Creatinine Trend: 0.41 <--, 0.45 <--, 0.44 <--, 0.45 <--, 0.46 <--, 0.49 <--, 0.52 <--    Phosphorus Level, Serum: 2.5 mg/dL (12-04 @ 06:00)                              9.3    11.62 )-----------( 206      ( 04 Dec 2022 06:00 )             29.7     Urine Studies:  Urinalysis - [11-17-22 @ 20:04]      Color Judy / Appearance Clear / SG 1.044 / pH 6.5      Gluc Negative / Ketone Small  / Bili Small / Urobili >12 mg/dL       Blood Small / Protein 100 mg/dL / Leuk Est Negative / Nitrite Negative      RBC 12 / WBC 11 / Hyaline 9 / Gran  / Sq Epi  / Non Sq Epi 11 / Bacteria Few    Urine Sodium 227      [12-02-22 @ 17:07]    Iron 97, TIBC <127, %sat --      [11-10-22 @ 06:55]  Ferritin 1570      [11-25-22 @ 06:00]  Vitamin D (25OH) 38.3      [11-15-22 @ 06:47]  Lipid: chol 84, TG 81, HDL 22, LDL --      [10-01-22 @ 07:10]    HBsAb Nonreact      [11-11-22 @ 04:15]  HBsAg Nonreact      [11-11-22 @ 04:15]  HBcAb Nonreact      [11-11-22 @ 04:15]  HCV 0.10, Nonreact      [11-15-22 @ 06:47]    ANCA: cANCA Negative, pANCA Negative, atypical ANCA Indeterminate Method interference due to CATHERINE Fluorescence      [11-10-22 @ 06:55]  MPO-ANCA <5.0, interpretation: Negative      [11-10-22 @ 06:55]  PR3-ANCA <5.0, interpretation: Negative      [11-10-22 @ 06:55]    RADIOLOGY & ADDITIONAL STUDIES:

## 2022-12-04 NOTE — PROGRESS NOTE ADULT - REASON FOR ADMISSION
RVATS, RUL Nodule Biopsy w/ IR marking

## 2022-12-23 PROBLEM — I26.99 OTHER PULMONARY EMBOLISM WITHOUT ACUTE COR PULMONALE: Chronic | Status: ACTIVE | Noted: 2021-05-11

## 2022-12-23 PROBLEM — I82.409 ACUTE EMBOLISM AND THROMBOSIS OF UNSPECIFIED DEEP VEINS OF UNSPECIFIED LOWER EXTREMITY: Chronic | Status: ACTIVE | Noted: 2021-05-11

## 2023-01-01 ENCOUNTER — RESULT REVIEW (OUTPATIENT)
Age: 83
End: 2023-01-01

## 2023-01-01 ENCOUNTER — APPOINTMENT (OUTPATIENT)
Dept: RHEUMATOLOGY | Facility: CLINIC | Age: 83
End: 2023-01-01
Payer: MEDICARE

## 2023-01-01 ENCOUNTER — INPATIENT (INPATIENT)
Facility: HOSPITAL | Age: 83
LOS: 33 days | DRG: 811 | End: 2023-02-21
Attending: INTERNAL MEDICINE | Admitting: HOSPITALIST
Payer: MEDICARE

## 2023-01-01 VITALS
HEART RATE: 100 BPM | OXYGEN SATURATION: 97 % | WEIGHT: 115.08 LBS | SYSTOLIC BLOOD PRESSURE: 113 MMHG | TEMPERATURE: 98 F | HEIGHT: 60 IN | DIASTOLIC BLOOD PRESSURE: 57 MMHG

## 2023-01-01 VITALS
DIASTOLIC BLOOD PRESSURE: 56 MMHG | TEMPERATURE: 98 F | OXYGEN SATURATION: 81 % | SYSTOLIC BLOOD PRESSURE: 94 MMHG | RESPIRATION RATE: 20 BRPM | HEART RATE: 126 BPM

## 2023-01-01 DIAGNOSIS — Z71.89 OTHER SPECIFIED COUNSELING: ICD-10-CM

## 2023-01-01 DIAGNOSIS — R52 PAIN, UNSPECIFIED: ICD-10-CM

## 2023-01-01 DIAGNOSIS — Z51.5 ENCOUNTER FOR PALLIATIVE CARE: ICD-10-CM

## 2023-01-01 DIAGNOSIS — R45.1 RESTLESSNESS AND AGITATION: ICD-10-CM

## 2023-01-01 DIAGNOSIS — I77.6 ARTERITIS, UNSPECIFIED: ICD-10-CM

## 2023-01-01 DIAGNOSIS — R53.81 OTHER MALAISE: ICD-10-CM

## 2023-01-01 DIAGNOSIS — C85.90 NON-HODGKIN LYMPHOMA, UNSPECIFIED, UNSPECIFIED SITE: Chronic | ICD-10-CM

## 2023-01-01 DIAGNOSIS — D64.9 ANEMIA, UNSPECIFIED: ICD-10-CM

## 2023-01-01 DIAGNOSIS — R04.0 EPISTAXIS: ICD-10-CM

## 2023-01-01 DIAGNOSIS — R09.89 OTHER SPECIFIED SYMPTOMS AND SIGNS INVOLVING THE CIRCULATORY AND RESPIRATORY SYSTEMS: ICD-10-CM

## 2023-01-01 DIAGNOSIS — R91.1 SOLITARY PULMONARY NODULE: Chronic | ICD-10-CM

## 2023-01-01 DIAGNOSIS — B00.9 HERPESVIRAL INFECTION, UNSPECIFIED: ICD-10-CM

## 2023-01-01 DIAGNOSIS — Z90.49 ACQUIRED ABSENCE OF OTHER SPECIFIED PARTS OF DIGESTIVE TRACT: Chronic | ICD-10-CM

## 2023-01-01 DIAGNOSIS — R06.00 DYSPNEA, UNSPECIFIED: ICD-10-CM

## 2023-01-01 DIAGNOSIS — R53.2 FUNCTIONAL QUADRIPLEGIA: ICD-10-CM

## 2023-01-01 LAB
-  AMPICILLIN/SULBACTAM: SIGNIFICANT CHANGE UP
-  AMPICILLIN: SIGNIFICANT CHANGE UP
-  CEFAZOLIN: SIGNIFICANT CHANGE UP
-  CIPROFLOXACIN: SIGNIFICANT CHANGE UP
-  CIPROFLOXACIN: SIGNIFICANT CHANGE UP
-  CLINDAMYCIN: SIGNIFICANT CHANGE UP
-  DAPTOMYCIN: SIGNIFICANT CHANGE UP
-  DAPTOMYCIN: SIGNIFICANT CHANGE UP
-  ERYTHROMYCIN: SIGNIFICANT CHANGE UP
-  GENTAMICIN SYNERGY: SIGNIFICANT CHANGE UP
-  GENTAMICIN: SIGNIFICANT CHANGE UP
-  LEVOFLOXACIN: SIGNIFICANT CHANGE UP
-  LEVOFLOXACIN: SIGNIFICANT CHANGE UP
-  LINEZOLID: SIGNIFICANT CHANGE UP
-  LINEZOLID: SIGNIFICANT CHANGE UP
-  NITROFURANTOIN: SIGNIFICANT CHANGE UP
-  NITROFURANTOIN: SIGNIFICANT CHANGE UP
-  OXACILLIN: SIGNIFICANT CHANGE UP
-  PENICILLIN: SIGNIFICANT CHANGE UP
-  RIFAMPIN: SIGNIFICANT CHANGE UP
-  STAPHYLOCOCCUS EPIDERMIDIS, METHICILLIN RESISTANT: SIGNIFICANT CHANGE UP
-  STAPHYLOCOCCUS EPIDERMIDIS, METHICILLIN RESISTANT: SIGNIFICANT CHANGE UP
-  STREPTOMYCIN SYNERGY: SIGNIFICANT CHANGE UP
-  TETRACYCLINE: SIGNIFICANT CHANGE UP
-  TRIMETHOPRIM/SULFAMETHOXAZOLE: SIGNIFICANT CHANGE UP
-  VANCOMYCIN: SIGNIFICANT CHANGE UP
24R-OH-CALCIDIOL SERPL-MCNC: 28.1 NG/ML — LOW (ref 30–80)
4/8 RATIO: 0.62 RATIO — LOW (ref 0.86–4.14)
4/8 RATIO: 0.85 RATIO — LOW (ref 0.86–4.14)
ABS CD8: 1126 CELLS/UL — HIGH (ref 90–775)
ABS CD8: 2341 CELLS/UL — HIGH (ref 90–775)
ALBUMIN SERPL ELPH-MCNC: 2.9 G/DL — LOW (ref 3.3–5)
ALBUMIN SERPL ELPH-MCNC: 2.9 G/DL — LOW (ref 3.3–5)
ALBUMIN SERPL ELPH-MCNC: 3.1 G/DL — LOW (ref 3.3–5)
ALBUMIN SERPL ELPH-MCNC: 3.2 G/DL — LOW (ref 3.3–5)
ALBUMIN SERPL ELPH-MCNC: 3.3 G/DL — SIGNIFICANT CHANGE UP (ref 3.3–5)
ALBUMIN SERPL ELPH-MCNC: 3.4 G/DL — SIGNIFICANT CHANGE UP (ref 3.3–5)
ALBUMIN SERPL ELPH-MCNC: 3.5 G/DL — SIGNIFICANT CHANGE UP (ref 3.3–5)
ALP SERPL-CCNC: 103 U/L — SIGNIFICANT CHANGE UP (ref 40–120)
ALP SERPL-CCNC: 104 U/L — SIGNIFICANT CHANGE UP (ref 40–120)
ALP SERPL-CCNC: 106 U/L — SIGNIFICANT CHANGE UP (ref 40–120)
ALP SERPL-CCNC: 50 U/L — SIGNIFICANT CHANGE UP (ref 40–120)
ALP SERPL-CCNC: 51 U/L — SIGNIFICANT CHANGE UP (ref 40–120)
ALP SERPL-CCNC: 54 U/L — SIGNIFICANT CHANGE UP (ref 40–120)
ALP SERPL-CCNC: 57 U/L — SIGNIFICANT CHANGE UP (ref 40–120)
ALP SERPL-CCNC: 61 U/L — SIGNIFICANT CHANGE UP (ref 40–120)
ALP SERPL-CCNC: 61 U/L — SIGNIFICANT CHANGE UP (ref 40–120)
ALP SERPL-CCNC: 63 U/L — SIGNIFICANT CHANGE UP (ref 40–120)
ALP SERPL-CCNC: 63 U/L — SIGNIFICANT CHANGE UP (ref 40–120)
ALP SERPL-CCNC: 69 U/L — SIGNIFICANT CHANGE UP (ref 40–120)
ALP SERPL-CCNC: 71 U/L — SIGNIFICANT CHANGE UP (ref 40–120)
ALP SERPL-CCNC: 74 U/L — SIGNIFICANT CHANGE UP (ref 40–120)
ALP SERPL-CCNC: 78 U/L — SIGNIFICANT CHANGE UP (ref 40–120)
ALP SERPL-CCNC: 93 U/L — SIGNIFICANT CHANGE UP (ref 40–120)
ALP SERPL-CCNC: 94 U/L — SIGNIFICANT CHANGE UP (ref 40–120)
ALP SERPL-CCNC: 97 U/L — SIGNIFICANT CHANGE UP (ref 40–120)
ALT FLD-CCNC: 14 U/L — SIGNIFICANT CHANGE UP (ref 10–45)
ALT FLD-CCNC: 5 U/L — LOW (ref 10–45)
ALT FLD-CCNC: 6 U/L — LOW (ref 10–45)
ALT FLD-CCNC: 7 U/L — LOW (ref 10–45)
ALT FLD-CCNC: 8 U/L — LOW (ref 10–45)
ALT FLD-CCNC: 8 U/L — LOW (ref 10–45)
ANION GAP SERPL CALC-SCNC: 10 MMOL/L — SIGNIFICANT CHANGE UP (ref 5–17)
ANION GAP SERPL CALC-SCNC: 11 MMOL/L — SIGNIFICANT CHANGE UP (ref 5–17)
ANION GAP SERPL CALC-SCNC: 12 MMOL/L — SIGNIFICANT CHANGE UP (ref 5–17)
ANION GAP SERPL CALC-SCNC: 13 MMOL/L — SIGNIFICANT CHANGE UP (ref 5–17)
ANION GAP SERPL CALC-SCNC: 14 MMOL/L — SIGNIFICANT CHANGE UP (ref 5–17)
ANION GAP SERPL CALC-SCNC: 15 MMOL/L — SIGNIFICANT CHANGE UP (ref 5–17)
ANION GAP SERPL CALC-SCNC: 16 MMOL/L — SIGNIFICANT CHANGE UP (ref 5–17)
ANION GAP SERPL CALC-SCNC: 18 MMOL/L — HIGH (ref 5–17)
ANISOCYTOSIS BLD QL: SIGNIFICANT CHANGE UP
APPEARANCE UR: ABNORMAL
APPEARANCE UR: ABNORMAL
APPEARANCE UR: CLEAR — SIGNIFICANT CHANGE UP
APTT BLD: 32.4 SEC — SIGNIFICANT CHANGE UP (ref 27.5–35.5)
APTT BLD: 35.9 SEC — HIGH (ref 27.5–35.5)
AST SERPL-CCNC: 15 U/L — SIGNIFICANT CHANGE UP (ref 10–40)
AST SERPL-CCNC: 16 U/L — SIGNIFICANT CHANGE UP (ref 10–40)
AST SERPL-CCNC: 17 U/L — SIGNIFICANT CHANGE UP (ref 10–40)
AST SERPL-CCNC: 18 U/L — SIGNIFICANT CHANGE UP (ref 10–40)
AST SERPL-CCNC: 19 U/L — SIGNIFICANT CHANGE UP (ref 10–40)
AST SERPL-CCNC: 19 U/L — SIGNIFICANT CHANGE UP (ref 10–40)
AST SERPL-CCNC: 20 U/L — SIGNIFICANT CHANGE UP (ref 10–40)
AST SERPL-CCNC: 21 U/L — SIGNIFICANT CHANGE UP (ref 10–40)
AST SERPL-CCNC: 21 U/L — SIGNIFICANT CHANGE UP (ref 10–40)
AST SERPL-CCNC: 22 U/L — SIGNIFICANT CHANGE UP (ref 10–40)
AST SERPL-CCNC: 27 U/L — SIGNIFICANT CHANGE UP (ref 10–40)
AST SERPL-CCNC: 27 U/L — SIGNIFICANT CHANGE UP (ref 10–40)
AST SERPL-CCNC: 28 U/L — SIGNIFICANT CHANGE UP (ref 10–40)
AST SERPL-CCNC: 30 U/L — SIGNIFICANT CHANGE UP (ref 10–40)
AST SERPL-CCNC: 39 U/L — SIGNIFICANT CHANGE UP (ref 10–40)
AST SERPL-CCNC: 68 U/L — HIGH (ref 10–40)
B PERT DNA SPEC QL NAA+PROBE: SIGNIFICANT CHANGE UP
BACTERIA # UR AUTO: ABNORMAL
BACTERIA # UR AUTO: ABNORMAL
BACTERIA # UR AUTO: NEGATIVE — SIGNIFICANT CHANGE UP
BASE EXCESS BLDA CALC-SCNC: -3.8 MMOL/L — LOW (ref -2–3)
BASE EXCESS BLDV CALC-SCNC: -1.8 MMOL/L — SIGNIFICANT CHANGE UP (ref -2–3)
BASOPHILS # BLD AUTO: 0 K/UL — SIGNIFICANT CHANGE UP (ref 0–0.2)
BASOPHILS # BLD AUTO: 0.3 K/UL — HIGH (ref 0–0.2)
BASOPHILS # BLD AUTO: 0.45 K/UL — HIGH (ref 0–0.2)
BASOPHILS # BLD AUTO: 0.85 K/UL — HIGH (ref 0–0.2)
BASOPHILS # BLD AUTO: 0.86 K/UL — HIGH (ref 0–0.2)
BASOPHILS NFR BLD AUTO: 0 % — SIGNIFICANT CHANGE UP (ref 0–2)
BASOPHILS NFR BLD AUTO: 0.9 % — SIGNIFICANT CHANGE UP (ref 0–2)
BASOPHILS NFR BLD AUTO: 1 % — SIGNIFICANT CHANGE UP (ref 0–2)
BASOPHILS NFR BLD AUTO: 1.9 % — SIGNIFICANT CHANGE UP (ref 0–2)
BASOPHILS NFR BLD AUTO: 2 % — SIGNIFICANT CHANGE UP (ref 0–2)
BCR/ABL BY RT - PCR QUANTITATIVE: SIGNIFICANT CHANGE UP
BILIRUB DIRECT SERPL-MCNC: 0.2 MG/DL — SIGNIFICANT CHANGE UP (ref 0–0.3)
BILIRUB DIRECT SERPL-MCNC: 0.9 MG/DL — HIGH (ref 0–0.3)
BILIRUB INDIRECT FLD-MCNC: 0.3 MG/DL — SIGNIFICANT CHANGE UP (ref 0.2–1)
BILIRUB SERPL-MCNC: 0.5 MG/DL — SIGNIFICANT CHANGE UP (ref 0.2–1.2)
BILIRUB SERPL-MCNC: 0.6 MG/DL — SIGNIFICANT CHANGE UP (ref 0.2–1.2)
BILIRUB SERPL-MCNC: 0.7 MG/DL — SIGNIFICANT CHANGE UP (ref 0.2–1.2)
BILIRUB SERPL-MCNC: 0.8 MG/DL — SIGNIFICANT CHANGE UP (ref 0.2–1.2)
BILIRUB SERPL-MCNC: 0.8 MG/DL — SIGNIFICANT CHANGE UP (ref 0.2–1.2)
BILIRUB SERPL-MCNC: 1 MG/DL — SIGNIFICANT CHANGE UP (ref 0.2–1.2)
BILIRUB SERPL-MCNC: 1 MG/DL — SIGNIFICANT CHANGE UP (ref 0.2–1.2)
BILIRUB SERPL-MCNC: 1.1 MG/DL — SIGNIFICANT CHANGE UP (ref 0.2–1.2)
BILIRUB SERPL-MCNC: 1.2 MG/DL — SIGNIFICANT CHANGE UP (ref 0.2–1.2)
BILIRUB SERPL-MCNC: 1.3 MG/DL — HIGH (ref 0.2–1.2)
BILIRUB SERPL-MCNC: 1.6 MG/DL — HIGH (ref 0.2–1.2)
BILIRUB SERPL-MCNC: 2 MG/DL — HIGH (ref 0.2–1.2)
BILIRUB SERPL-MCNC: 2.3 MG/DL — HIGH (ref 0.2–1.2)
BILIRUB UR-MCNC: NEGATIVE — SIGNIFICANT CHANGE UP
BLASTS # FLD: 2 % — HIGH (ref 0–0)
BLASTS # FLD: 2 % — HIGH (ref 0–0)
BLD GP AB SCN SERPL QL: NEGATIVE — SIGNIFICANT CHANGE UP
BUN SERPL-MCNC: 12 MG/DL — SIGNIFICANT CHANGE UP (ref 7–23)
BUN SERPL-MCNC: 12 MG/DL — SIGNIFICANT CHANGE UP (ref 7–23)
BUN SERPL-MCNC: 14 MG/DL — SIGNIFICANT CHANGE UP (ref 7–23)
BUN SERPL-MCNC: 14 MG/DL — SIGNIFICANT CHANGE UP (ref 7–23)
BUN SERPL-MCNC: 15 MG/DL — SIGNIFICANT CHANGE UP (ref 7–23)
BUN SERPL-MCNC: 16 MG/DL — SIGNIFICANT CHANGE UP (ref 7–23)
BUN SERPL-MCNC: 16 MG/DL — SIGNIFICANT CHANGE UP (ref 7–23)
BUN SERPL-MCNC: 17 MG/DL — SIGNIFICANT CHANGE UP (ref 7–23)
BUN SERPL-MCNC: 17 MG/DL — SIGNIFICANT CHANGE UP (ref 7–23)
BUN SERPL-MCNC: 20 MG/DL — SIGNIFICANT CHANGE UP (ref 7–23)
BUN SERPL-MCNC: 20 MG/DL — SIGNIFICANT CHANGE UP (ref 7–23)
BUN SERPL-MCNC: 22 MG/DL — SIGNIFICANT CHANGE UP (ref 7–23)
BUN SERPL-MCNC: 26 MG/DL — HIGH (ref 7–23)
BUN SERPL-MCNC: 26 MG/DL — HIGH (ref 7–23)
BUN SERPL-MCNC: 29 MG/DL — HIGH (ref 7–23)
BUN SERPL-MCNC: 30 MG/DL — HIGH (ref 7–23)
BUN SERPL-MCNC: 30 MG/DL — HIGH (ref 7–23)
BUN SERPL-MCNC: 32 MG/DL — HIGH (ref 7–23)
BUN SERPL-MCNC: 32 MG/DL — HIGH (ref 7–23)
BUN SERPL-MCNC: 33 MG/DL — HIGH (ref 7–23)
BUN SERPL-MCNC: 34 MG/DL — HIGH (ref 7–23)
BUN SERPL-MCNC: 36 MG/DL — HIGH (ref 7–23)
C PNEUM DNA SPEC QL NAA+PROBE: SIGNIFICANT CHANGE UP
CA-I SERPL-SCNC: 1.23 MMOL/L — SIGNIFICANT CHANGE UP (ref 1.15–1.33)
CALCIUM SERPL-MCNC: 7.7 MG/DL — LOW (ref 8.4–10.5)
CALCIUM SERPL-MCNC: 7.7 MG/DL — LOW (ref 8.4–10.5)
CALCIUM SERPL-MCNC: 8 MG/DL — LOW (ref 8.4–10.5)
CALCIUM SERPL-MCNC: 8.3 MG/DL — LOW (ref 8.4–10.5)
CALCIUM SERPL-MCNC: 8.4 MG/DL — SIGNIFICANT CHANGE UP (ref 8.4–10.5)
CALCIUM SERPL-MCNC: 8.5 MG/DL — SIGNIFICANT CHANGE UP (ref 8.4–10.5)
CALCIUM SERPL-MCNC: 8.6 MG/DL — SIGNIFICANT CHANGE UP (ref 8.4–10.5)
CALCIUM SERPL-MCNC: 8.7 MG/DL — SIGNIFICANT CHANGE UP (ref 8.4–10.5)
CALCIUM SERPL-MCNC: 8.8 MG/DL — SIGNIFICANT CHANGE UP (ref 8.4–10.5)
CALCIUM SERPL-MCNC: 8.8 MG/DL — SIGNIFICANT CHANGE UP (ref 8.4–10.5)
CALCIUM SERPL-MCNC: 8.9 MG/DL — SIGNIFICANT CHANGE UP (ref 8.4–10.5)
CALCIUM SERPL-MCNC: 9 MG/DL — SIGNIFICANT CHANGE UP (ref 8.4–10.5)
CALCIUM SERPL-MCNC: 9.1 MG/DL — SIGNIFICANT CHANGE UP (ref 8.4–10.5)
CALCIUM SERPL-MCNC: 9.2 MG/DL — SIGNIFICANT CHANGE UP (ref 8.4–10.5)
CD16+CD56+ CELLS NFR BLD: 13 % — SIGNIFICANT CHANGE UP (ref 7–27)
CD16+CD56+ CELLS NFR BLD: 9 % — SIGNIFICANT CHANGE UP (ref 7–27)
CD16+CD56+ CELLS NFR SPEC: 412 CELLS/UL — SIGNIFICANT CHANGE UP (ref 80–426)
CD16+CD56+ CELLS NFR SPEC: 426 CELLS/UL — SIGNIFICANT CHANGE UP (ref 80–426)
CD19 BLASTS SPEC-ACNC: 161 CELLS/UL — SIGNIFICANT CHANGE UP (ref 32–326)
CD19 BLASTS SPEC-ACNC: 440 CELLS/UL — HIGH (ref 32–326)
CD19 BLASTS SPEC-ACNC: 5 % — SIGNIFICANT CHANGE UP (ref 4–18)
CD19 BLASTS SPEC-ACNC: 9 % — SIGNIFICANT CHANGE UP (ref 4–18)
CD3 BLASTS SPEC-ACNC: 2745 CELLS/UL — HIGH (ref 396–2024)
CD3 BLASTS SPEC-ACNC: 3958 CELLS/UL — HIGH (ref 396–2024)
CD3 BLASTS SPEC-ACNC: 80 % — SIGNIFICANT CHANGE UP (ref 58–84)
CD3 BLASTS SPEC-ACNC: 82 % — SIGNIFICANT CHANGE UP (ref 58–84)
CD4 %: 28 % — LOW (ref 30–56)
CD4 %: 29 % — LOW (ref 30–56)
CD8 %: 33 % — SIGNIFICANT CHANGE UP (ref 11–43)
CD8 %: 46 % — HIGH (ref 11–43)
CHLORIDE BLDV-SCNC: 103 MMOL/L — SIGNIFICANT CHANGE UP (ref 96–108)
CHLORIDE SERPL-SCNC: 100 MMOL/L — SIGNIFICANT CHANGE UP (ref 96–108)
CHLORIDE SERPL-SCNC: 101 MMOL/L — SIGNIFICANT CHANGE UP (ref 96–108)
CHLORIDE SERPL-SCNC: 101 MMOL/L — SIGNIFICANT CHANGE UP (ref 96–108)
CHLORIDE SERPL-SCNC: 102 MMOL/L — SIGNIFICANT CHANGE UP (ref 96–108)
CHLORIDE SERPL-SCNC: 103 MMOL/L — SIGNIFICANT CHANGE UP (ref 96–108)
CHLORIDE SERPL-SCNC: 104 MMOL/L — SIGNIFICANT CHANGE UP (ref 96–108)
CHLORIDE SERPL-SCNC: 105 MMOL/L — SIGNIFICANT CHANGE UP (ref 96–108)
CHLORIDE SERPL-SCNC: 106 MMOL/L — SIGNIFICANT CHANGE UP (ref 96–108)
CHLORIDE SERPL-SCNC: 107 MMOL/L — SIGNIFICANT CHANGE UP (ref 96–108)
CHLORIDE SERPL-SCNC: 95 MMOL/L — LOW (ref 96–108)
CHLORIDE SERPL-SCNC: 99 MMOL/L — SIGNIFICANT CHANGE UP (ref 96–108)
CO2 BLDA-SCNC: 20 MMOL/L — SIGNIFICANT CHANGE UP (ref 19–24)
CO2 BLDV-SCNC: 22 MMOL/L — SIGNIFICANT CHANGE UP (ref 22–26)
CO2 SERPL-SCNC: 16 MMOL/L — LOW (ref 22–31)
CO2 SERPL-SCNC: 18 MMOL/L — LOW (ref 22–31)
CO2 SERPL-SCNC: 19 MMOL/L — LOW (ref 22–31)
CO2 SERPL-SCNC: 20 MMOL/L — LOW (ref 22–31)
CO2 SERPL-SCNC: 21 MMOL/L — LOW (ref 22–31)
CO2 SERPL-SCNC: 22 MMOL/L — SIGNIFICANT CHANGE UP (ref 22–31)
CO2 SERPL-SCNC: 23 MMOL/L — SIGNIFICANT CHANGE UP (ref 22–31)
CO2 SERPL-SCNC: 25 MMOL/L — SIGNIFICANT CHANGE UP (ref 22–31)
CO2 SERPL-SCNC: 26 MMOL/L — SIGNIFICANT CHANGE UP (ref 22–31)
CO2 SERPL-SCNC: 27 MMOL/L — SIGNIFICANT CHANGE UP (ref 22–31)
COLOR SPEC: YELLOW — SIGNIFICANT CHANGE UP
CREAT ?TM UR-MCNC: 79 MG/DL — SIGNIFICANT CHANGE UP
CREAT SERPL-MCNC: 0.53 MG/DL — SIGNIFICANT CHANGE UP (ref 0.5–1.3)
CREAT SERPL-MCNC: 0.55 MG/DL — SIGNIFICANT CHANGE UP (ref 0.5–1.3)
CREAT SERPL-MCNC: 0.55 MG/DL — SIGNIFICANT CHANGE UP (ref 0.5–1.3)
CREAT SERPL-MCNC: 0.57 MG/DL — SIGNIFICANT CHANGE UP (ref 0.5–1.3)
CREAT SERPL-MCNC: 0.58 MG/DL — SIGNIFICANT CHANGE UP (ref 0.5–1.3)
CREAT SERPL-MCNC: 0.59 MG/DL — SIGNIFICANT CHANGE UP (ref 0.5–1.3)
CREAT SERPL-MCNC: 0.6 MG/DL — SIGNIFICANT CHANGE UP (ref 0.5–1.3)
CREAT SERPL-MCNC: 0.61 MG/DL — SIGNIFICANT CHANGE UP (ref 0.5–1.3)
CREAT SERPL-MCNC: 0.64 MG/DL — SIGNIFICANT CHANGE UP (ref 0.5–1.3)
CREAT SERPL-MCNC: 0.65 MG/DL — SIGNIFICANT CHANGE UP (ref 0.5–1.3)
CREAT SERPL-MCNC: 0.67 MG/DL — SIGNIFICANT CHANGE UP (ref 0.5–1.3)
CREAT SERPL-MCNC: 0.68 MG/DL — SIGNIFICANT CHANGE UP (ref 0.5–1.3)
CREAT SERPL-MCNC: 0.69 MG/DL — SIGNIFICANT CHANGE UP (ref 0.5–1.3)
CREAT SERPL-MCNC: 0.69 MG/DL — SIGNIFICANT CHANGE UP (ref 0.5–1.3)
CREAT SERPL-MCNC: 0.72 MG/DL — SIGNIFICANT CHANGE UP (ref 0.5–1.3)
CREAT SERPL-MCNC: 0.72 MG/DL — SIGNIFICANT CHANGE UP (ref 0.5–1.3)
CREAT SERPL-MCNC: 0.73 MG/DL — SIGNIFICANT CHANGE UP (ref 0.5–1.3)
CREAT SERPL-MCNC: 0.8 MG/DL — SIGNIFICANT CHANGE UP (ref 0.5–1.3)
CREAT SERPL-MCNC: 0.81 MG/DL — SIGNIFICANT CHANGE UP (ref 0.5–1.3)
CREAT SERPL-MCNC: 0.83 MG/DL — SIGNIFICANT CHANGE UP (ref 0.5–1.3)
CREAT SERPL-MCNC: 0.86 MG/DL — SIGNIFICANT CHANGE UP (ref 0.5–1.3)
CREAT SERPL-MCNC: 0.87 MG/DL — SIGNIFICANT CHANGE UP (ref 0.5–1.3)
CREAT SERPL-MCNC: 1.02 MG/DL — SIGNIFICANT CHANGE UP (ref 0.5–1.3)
CREAT SERPL-MCNC: 1.04 MG/DL — SIGNIFICANT CHANGE UP (ref 0.5–1.3)
CREAT SERPL-MCNC: 1.05 MG/DL — SIGNIFICANT CHANGE UP (ref 0.5–1.3)
CREAT SERPL-MCNC: 1.05 MG/DL — SIGNIFICANT CHANGE UP (ref 0.5–1.3)
CREAT SERPL-MCNC: 1.2 MG/DL — SIGNIFICANT CHANGE UP (ref 0.5–1.3)
CREAT SERPL-MCNC: 1.43 MG/DL — HIGH (ref 0.5–1.3)
CULTURE RESULTS: SIGNIFICANT CHANGE UP
DIFF PNL FLD: ABNORMAL
DNA PLOIDY SPEC FC-IMP: SIGNIFICANT CHANGE UP
DNA PLOIDY SPEC FC-IMP: SIGNIFICANT CHANGE UP
E FAECALIS DNA BLD POS QL NAA+NON-PROBE: SIGNIFICANT CHANGE UP
EGFR: 37 ML/MIN/1.73M2 — LOW
EGFR: 46 ML/MIN/1.73M2 — LOW
EGFR: 54 ML/MIN/1.73M2 — LOW
EGFR: 56 ML/MIN/1.73M2 — LOW
EGFR: 67 ML/MIN/1.73M2 — SIGNIFICANT CHANGE UP
EGFR: 68 ML/MIN/1.73M2 — SIGNIFICANT CHANGE UP
EGFR: 71 ML/MIN/1.73M2 — SIGNIFICANT CHANGE UP
EGFR: 73 ML/MIN/1.73M2 — SIGNIFICANT CHANGE UP
EGFR: 74 ML/MIN/1.73M2 — SIGNIFICANT CHANGE UP
EGFR: 83 ML/MIN/1.73M2 — SIGNIFICANT CHANGE UP
EGFR: 84 ML/MIN/1.73M2 — SIGNIFICANT CHANGE UP
EGFR: 84 ML/MIN/1.73M2 — SIGNIFICANT CHANGE UP
EGFR: 88 ML/MIN/1.73M2 — SIGNIFICANT CHANGE UP
EGFR: 89 ML/MIN/1.73M2 — SIGNIFICANT CHANGE UP
EGFR: 89 ML/MIN/1.73M2 — SIGNIFICANT CHANGE UP
EGFR: 90 ML/MIN/1.73M2 — SIGNIFICANT CHANGE UP
EGFR: 91 ML/MIN/1.73M2 — SIGNIFICANT CHANGE UP
EGFR: 92 ML/MIN/1.73M2 — SIGNIFICANT CHANGE UP
EGFR: 93 ML/MIN/1.73M2 — SIGNIFICANT CHANGE UP
ELLIPTOCYTES BLD QL SMEAR: SLIGHT — SIGNIFICANT CHANGE UP
EOSINOPHIL # BLD AUTO: 0 K/UL — SIGNIFICANT CHANGE UP (ref 0–0.5)
EOSINOPHIL # BLD AUTO: 0.42 K/UL — SIGNIFICANT CHANGE UP (ref 0–0.5)
EOSINOPHIL NFR BLD AUTO: 0 % — SIGNIFICANT CHANGE UP (ref 0–6)
EOSINOPHIL NFR BLD AUTO: 1 % — SIGNIFICANT CHANGE UP (ref 0–6)
EPI CELLS # UR: 10 /HPF — HIGH
EPI CELLS # UR: 2 /HPF — SIGNIFICANT CHANGE UP
EPI CELLS # UR: 4 /HPF — SIGNIFICANT CHANGE UP
FERRITIN SERPL-MCNC: 5768 NG/ML — HIGH (ref 15–150)
FLUAV H1 2009 PAND RNA SPEC QL NAA+PROBE: SIGNIFICANT CHANGE UP
FLUAV H1 RNA SPEC QL NAA+PROBE: SIGNIFICANT CHANGE UP
FLUAV H3 RNA SPEC QL NAA+PROBE: SIGNIFICANT CHANGE UP
FLUAV SUBTYP SPEC NAA+PROBE: SIGNIFICANT CHANGE UP
FLUBV RNA SPEC QL NAA+PROBE: SIGNIFICANT CHANGE UP
FOLATE SERPL-MCNC: >20 NG/ML — SIGNIFICANT CHANGE UP
FUNGITELL: 60 PG/ML — SIGNIFICANT CHANGE UP
G6PD RBC-CCNC: 27.3 U/G HGB — HIGH (ref 7–20.5)
GAMMA INTERFERON BACKGROUND BLD IA-ACNC: 0.06 IU/ML — SIGNIFICANT CHANGE UP
GAS PNL BLDV: 133 MMOL/L — LOW (ref 136–145)
GAS PNL BLDV: SIGNIFICANT CHANGE UP
GLUCOSE BLDV-MCNC: 97 MG/DL — SIGNIFICANT CHANGE UP (ref 70–99)
GLUCOSE SERPL-MCNC: 107 MG/DL — HIGH (ref 70–99)
GLUCOSE SERPL-MCNC: 112 MG/DL — HIGH (ref 70–99)
GLUCOSE SERPL-MCNC: 119 MG/DL — HIGH (ref 70–99)
GLUCOSE SERPL-MCNC: 120 MG/DL — HIGH (ref 70–99)
GLUCOSE SERPL-MCNC: 126 MG/DL — HIGH (ref 70–99)
GLUCOSE SERPL-MCNC: 131 MG/DL — HIGH (ref 70–99)
GLUCOSE SERPL-MCNC: 132 MG/DL — HIGH (ref 70–99)
GLUCOSE SERPL-MCNC: 149 MG/DL — HIGH (ref 70–99)
GLUCOSE SERPL-MCNC: 153 MG/DL — HIGH (ref 70–99)
GLUCOSE SERPL-MCNC: 401 MG/DL — HIGH (ref 70–99)
GLUCOSE SERPL-MCNC: 48 MG/DL — CRITICAL LOW (ref 70–99)
GLUCOSE SERPL-MCNC: 53 MG/DL — CRITICAL LOW (ref 70–99)
GLUCOSE SERPL-MCNC: 58 MG/DL — LOW (ref 70–99)
GLUCOSE SERPL-MCNC: 64 MG/DL — LOW (ref 70–99)
GLUCOSE SERPL-MCNC: 64 MG/DL — LOW (ref 70–99)
GLUCOSE SERPL-MCNC: 65 MG/DL — LOW (ref 70–99)
GLUCOSE SERPL-MCNC: 66 MG/DL — LOW (ref 70–99)
GLUCOSE SERPL-MCNC: 66 MG/DL — LOW (ref 70–99)
GLUCOSE SERPL-MCNC: 67 MG/DL — LOW (ref 70–99)
GLUCOSE SERPL-MCNC: 72 MG/DL — SIGNIFICANT CHANGE UP (ref 70–99)
GLUCOSE SERPL-MCNC: 73 MG/DL — SIGNIFICANT CHANGE UP (ref 70–99)
GLUCOSE SERPL-MCNC: 74 MG/DL — SIGNIFICANT CHANGE UP (ref 70–99)
GLUCOSE SERPL-MCNC: 77 MG/DL — SIGNIFICANT CHANGE UP (ref 70–99)
GLUCOSE SERPL-MCNC: 80 MG/DL — SIGNIFICANT CHANGE UP (ref 70–99)
GLUCOSE SERPL-MCNC: 94 MG/DL — SIGNIFICANT CHANGE UP (ref 70–99)
GLUCOSE SERPL-MCNC: 95 MG/DL — SIGNIFICANT CHANGE UP (ref 70–99)
GLUCOSE UR QL: NEGATIVE — SIGNIFICANT CHANGE UP
GRAM STN FLD: SIGNIFICANT CHANGE UP
HADV DNA SPEC QL NAA+PROBE: SIGNIFICANT CHANGE UP
HAPTOGLOB SERPL-MCNC: 202 MG/DL — HIGH (ref 34–200)
HAV IGM SER-ACNC: SIGNIFICANT CHANGE UP
HBV CORE AB SER-ACNC: SIGNIFICANT CHANGE UP
HBV CORE IGM SER-ACNC: SIGNIFICANT CHANGE UP
HBV SURFACE AG SER-ACNC: SIGNIFICANT CHANGE UP
HCO3 BLDA-SCNC: 19 MMOL/L — LOW (ref 21–28)
HCO3 BLDV-SCNC: 21 MMOL/L — LOW (ref 22–29)
HCOV PNL SPEC NAA+PROBE: SIGNIFICANT CHANGE UP
HCT VFR BLD CALC: 17.3 % — CRITICAL LOW (ref 34.5–45)
HCT VFR BLD CALC: 18.1 % — CRITICAL LOW (ref 34.5–45)
HCT VFR BLD CALC: 18.2 % — CRITICAL LOW (ref 34.5–45)
HCT VFR BLD CALC: 18.2 % — CRITICAL LOW (ref 34.5–45)
HCT VFR BLD CALC: 18.3 % — CRITICAL LOW (ref 34.5–45)
HCT VFR BLD CALC: 18.4 % — CRITICAL LOW (ref 34.5–45)
HCT VFR BLD CALC: 19.7 % — CRITICAL LOW (ref 34.5–45)
HCT VFR BLD CALC: 20.8 % — CRITICAL LOW (ref 34.5–45)
HCT VFR BLD CALC: 20.9 % — CRITICAL LOW (ref 34.5–45)
HCT VFR BLD CALC: 21.7 % — LOW (ref 34.5–45)
HCT VFR BLD CALC: 21.9 % — LOW (ref 34.5–45)
HCT VFR BLD CALC: 21.9 % — LOW (ref 34.5–45)
HCT VFR BLD CALC: 22.1 % — LOW (ref 34.5–45)
HCT VFR BLD CALC: 22.1 % — LOW (ref 34.5–45)
HCT VFR BLD CALC: 23.1 % — LOW (ref 34.5–45)
HCT VFR BLD CALC: 23.6 % — LOW (ref 34.5–45)
HCT VFR BLD CALC: 23.7 % — LOW (ref 34.5–45)
HCT VFR BLD CALC: 24.1 % — LOW (ref 34.5–45)
HCT VFR BLD CALC: 24.8 % — LOW (ref 34.5–45)
HCT VFR BLD CALC: 26.3 % — LOW (ref 34.5–45)
HCT VFR BLD CALC: 26.3 % — LOW (ref 34.5–45)
HCT VFR BLD CALC: 26.4 % — LOW (ref 34.5–45)
HCT VFR BLD CALC: 26.7 % — LOW (ref 34.5–45)
HCT VFR BLD CALC: 26.8 % — LOW (ref 34.5–45)
HCT VFR BLD CALC: 26.9 % — LOW (ref 34.5–45)
HCT VFR BLD CALC: 27.1 % — LOW (ref 34.5–45)
HCT VFR BLD CALC: 28.9 % — LOW (ref 34.5–45)
HCT VFR BLD CALC: 29 % — LOW (ref 34.5–45)
HCT VFR BLD CALC: 29.5 % — LOW (ref 34.5–45)
HCT VFR BLD CALC: 29.6 % — LOW (ref 34.5–45)
HCT VFR BLD CALC: 29.7 % — LOW (ref 34.5–45)
HCT VFR BLD CALC: 30.1 % — LOW (ref 34.5–45)
HCT VFR BLD CALC: 30.7 % — LOW (ref 34.5–45)
HCT VFR BLD CALC: 30.7 % — LOW (ref 34.5–45)
HCT VFR BLD CALC: 31.2 % — LOW (ref 34.5–45)
HCT VFR BLD CALC: 32.2 % — LOW (ref 34.5–45)
HCT VFR BLD CALC: 33.7 % — LOW (ref 34.5–45)
HCT VFR BLD CALC: 34.1 % — LOW (ref 34.5–45)
HCT VFR BLDA CALC: 32 % — LOW (ref 34.5–46.5)
HCV AB S/CO SERPL IA: 0.15 S/CO — SIGNIFICANT CHANGE UP (ref 0–0.99)
HCV AB SERPL-IMP: SIGNIFICANT CHANGE UP
HGB BLD CALC-MCNC: 10.7 G/DL — LOW (ref 11.7–16.1)
HGB BLD-MCNC: 10.1 G/DL — LOW (ref 11.5–15.5)
HGB BLD-MCNC: 10.1 G/DL — LOW (ref 11.5–15.5)
HGB BLD-MCNC: 10.2 G/DL — LOW (ref 11.5–15.5)
HGB BLD-MCNC: 10.4 G/DL — LOW (ref 11.5–15.5)
HGB BLD-MCNC: 11.1 G/DL — LOW (ref 11.5–15.5)
HGB BLD-MCNC: 11.8 G/DL — SIGNIFICANT CHANGE UP (ref 11.5–15.5)
HGB BLD-MCNC: 12 G/DL — SIGNIFICANT CHANGE UP (ref 11.5–15.5)
HGB BLD-MCNC: 5.9 G/DL — CRITICAL LOW (ref 11.5–15.5)
HGB BLD-MCNC: 6.1 G/DL — CRITICAL LOW (ref 11.5–15.5)
HGB BLD-MCNC: 6.2 G/DL — CRITICAL LOW (ref 11.5–15.5)
HGB BLD-MCNC: 6.3 G/DL — CRITICAL LOW (ref 11.5–15.5)
HGB BLD-MCNC: 6.4 G/DL — CRITICAL LOW (ref 11.5–15.5)
HGB BLD-MCNC: 6.8 G/DL — CRITICAL LOW (ref 11.5–15.5)
HGB BLD-MCNC: 6.9 G/DL — CRITICAL LOW (ref 11.5–15.5)
HGB BLD-MCNC: 7 G/DL — CRITICAL LOW (ref 11.5–15.5)
HGB BLD-MCNC: 7.1 G/DL — LOW (ref 11.5–15.5)
HGB BLD-MCNC: 7.2 G/DL — LOW (ref 11.5–15.5)
HGB BLD-MCNC: 7.2 G/DL — LOW (ref 11.5–15.5)
HGB BLD-MCNC: 7.4 G/DL — LOW (ref 11.5–15.5)
HGB BLD-MCNC: 7.4 G/DL — LOW (ref 11.5–15.5)
HGB BLD-MCNC: 7.5 G/DL — LOW (ref 11.5–15.5)
HGB BLD-MCNC: 7.6 G/DL — LOW (ref 11.5–15.5)
HGB BLD-MCNC: 7.7 G/DL — LOW (ref 11.5–15.5)
HGB BLD-MCNC: 7.7 G/DL — LOW (ref 11.5–15.5)
HGB BLD-MCNC: 8.2 G/DL — LOW (ref 11.5–15.5)
HGB BLD-MCNC: 8.3 G/DL — LOW (ref 11.5–15.5)
HGB BLD-MCNC: 8.6 G/DL — LOW (ref 11.5–15.5)
HGB BLD-MCNC: 8.6 G/DL — LOW (ref 11.5–15.5)
HGB BLD-MCNC: 8.9 G/DL — LOW (ref 11.5–15.5)
HGB BLD-MCNC: 8.9 G/DL — LOW (ref 11.5–15.5)
HGB BLD-MCNC: 9 G/DL — LOW (ref 11.5–15.5)
HGB BLD-MCNC: 9.3 G/DL — LOW (ref 11.5–15.5)
HGB BLD-MCNC: 9.4 G/DL — LOW (ref 11.5–15.5)
HGB BLD-MCNC: 9.6 G/DL — LOW (ref 11.5–15.5)
HGB BLD-MCNC: 9.7 G/DL — LOW (ref 11.5–15.5)
HGB BLD-MCNC: 9.9 G/DL — LOW (ref 11.5–15.5)
HLX FLT3 FINAL REPORT: SIGNIFICANT CHANGE UP
HMPV RNA SPEC QL NAA+PROBE: SIGNIFICANT CHANGE UP
HPIV1 RNA SPEC QL NAA+PROBE: SIGNIFICANT CHANGE UP
HPIV2 RNA SPEC QL NAA+PROBE: SIGNIFICANT CHANGE UP
HPIV3 RNA SPEC QL NAA+PROBE: SIGNIFICANT CHANGE UP
HPIV4 RNA SPEC QL NAA+PROBE: SIGNIFICANT CHANGE UP
HSV+VZV DNA SPEC QL NAA+PROBE: ABNORMAL
HYALINE CASTS # UR AUTO: 0 /LPF — SIGNIFICANT CHANGE UP (ref 0–2)
HYALINE CASTS # UR AUTO: 4 /LPF — HIGH (ref 0–2)
HYALINE CASTS # UR AUTO: 5 /LPF — HIGH (ref 0–2)
HYPOSEGMENTATION: PRESENT — SIGNIFICANT CHANGE UP
IGA FLD-MCNC: 135 MG/DL — SIGNIFICANT CHANGE UP (ref 84–499)
IGA FLD-MCNC: 158 MG/DL — SIGNIFICANT CHANGE UP (ref 84–499)
IGG FLD-MCNC: 1133 MG/DL — SIGNIFICANT CHANGE UP (ref 610–1660)
IGG FLD-MCNC: 994 MG/DL — SIGNIFICANT CHANGE UP (ref 610–1660)
IGM SERPL-MCNC: 68 MG/DL — SIGNIFICANT CHANGE UP (ref 35–242)
IGM SERPL-MCNC: 72 MG/DL — SIGNIFICANT CHANGE UP (ref 35–242)
INR BLD: 1.2 RATIO — HIGH (ref 0.88–1.16)
INR BLD: 1.46 RATIO — HIGH (ref 0.88–1.16)
IRON SATN MFR SERPL: 152 UG/DL — SIGNIFICANT CHANGE UP (ref 30–160)
IRON SATN MFR SERPL: 84 % — HIGH (ref 14–50)
KAPPA LC SER QL IFE: 3.32 MG/DL — HIGH (ref 0.33–1.94)
KAPPA LC SER QL IFE: 4.65 MG/DL — HIGH (ref 0.33–1.94)
KAPPA/LAMBDA FREE LIGHT CHAIN RATIO, SERUM: 1.27 RATIO — SIGNIFICANT CHANGE UP (ref 0.26–1.65)
KAPPA/LAMBDA FREE LIGHT CHAIN RATIO, SERUM: 1.79 RATIO — HIGH (ref 0.26–1.65)
KETONES UR-MCNC: NEGATIVE — SIGNIFICANT CHANGE UP
KETONES UR-MCNC: SIGNIFICANT CHANGE UP
KETONES UR-MCNC: SIGNIFICANT CHANGE UP
LACTATE BLDV-MCNC: 1.4 MMOL/L — SIGNIFICANT CHANGE UP (ref 0.5–2)
LACTATE SERPL-SCNC: 1.5 MMOL/L — SIGNIFICANT CHANGE UP (ref 0.5–2)
LAMBDA LC SER QL IFE: 2.6 MG/DL — SIGNIFICANT CHANGE UP (ref 0.57–2.63)
LAMBDA LC SER QL IFE: 2.61 MG/DL — SIGNIFICANT CHANGE UP (ref 0.57–2.63)
LDH SERPL L TO P-CCNC: 1014 U/L — HIGH (ref 50–242)
LDH SERPL L TO P-CCNC: 785 U/L — HIGH (ref 50–242)
LDH SERPL L TO P-CCNC: 890 U/L — HIGH (ref 50–242)
LDH SERPL L TO P-CCNC: 896 U/L — HIGH (ref 50–242)
LDH SERPL L TO P-CCNC: 930 U/L — HIGH (ref 50–242)
LEUKOCYTE ESTERASE UR-ACNC: NEGATIVE — SIGNIFICANT CHANGE UP
LYMPHOCYTES # BLD AUTO: 11.5 K/UL — HIGH (ref 1–3.3)
LYMPHOCYTES # BLD AUTO: 12 % — LOW (ref 13–44)
LYMPHOCYTES # BLD AUTO: 12.65 K/UL — HIGH (ref 1–3.3)
LYMPHOCYTES # BLD AUTO: 13 % — SIGNIFICANT CHANGE UP (ref 13–44)
LYMPHOCYTES # BLD AUTO: 13.57 K/UL — HIGH (ref 1–3.3)
LYMPHOCYTES # BLD AUTO: 15 % — SIGNIFICANT CHANGE UP (ref 13–44)
LYMPHOCYTES # BLD AUTO: 16.4 K/UL — HIGH (ref 1–3.3)
LYMPHOCYTES # BLD AUTO: 21 % — SIGNIFICANT CHANGE UP (ref 13–44)
LYMPHOCYTES # BLD AUTO: 24.32 K/UL — HIGH (ref 1–3.3)
LYMPHOCYTES # BLD AUTO: 28 % — SIGNIFICANT CHANGE UP (ref 13–44)
LYMPHOCYTES # BLD AUTO: 28 % — SIGNIFICANT CHANGE UP (ref 13–44)
LYMPHOCYTES # BLD AUTO: 3.62 K/UL — HIGH (ref 1–3.3)
LYMPHOCYTES # BLD AUTO: 30 % — SIGNIFICANT CHANGE UP (ref 13–44)
LYMPHOCYTES # BLD AUTO: 37 % — SIGNIFICANT CHANGE UP (ref 13–44)
LYMPHOCYTES # BLD AUTO: 49.1 % — HIGH (ref 13–44)
LYMPHOCYTES # BLD AUTO: 8.06 K/UL — HIGH (ref 1–3.3)
LYMPHOCYTES # BLD AUTO: 8.7 K/UL — HIGH (ref 1–3.3)
LYMPHOCYTES # BLD AUTO: 8.9 K/UL — HIGH (ref 1–3.3)
LYMPHOCYTES # SPEC AUTO: 23 % — HIGH (ref 0–0)
M TB IFN-G BLD-IMP: ABNORMAL
M TB IFN-G CD4+ BCKGRND COR BLD-ACNC: -0.01 IU/ML — SIGNIFICANT CHANGE UP
M TB IFN-G CD4+CD8+ BCKGRND COR BLD-ACNC: -0.01 IU/ML — SIGNIFICANT CHANGE UP
MACROCYTES BLD QL: SLIGHT — SIGNIFICANT CHANGE UP
MAGNESIUM SERPL-MCNC: 1.6 MG/DL — SIGNIFICANT CHANGE UP (ref 1.6–2.6)
MAGNESIUM SERPL-MCNC: 1.7 MG/DL — SIGNIFICANT CHANGE UP (ref 1.6–2.6)
MAGNESIUM SERPL-MCNC: 1.8 MG/DL — SIGNIFICANT CHANGE UP (ref 1.6–2.6)
MAGNESIUM SERPL-MCNC: 1.8 MG/DL — SIGNIFICANT CHANGE UP (ref 1.6–2.6)
MAGNESIUM SERPL-MCNC: 1.9 MG/DL — SIGNIFICANT CHANGE UP (ref 1.6–2.6)
MAGNESIUM SERPL-MCNC: 1.9 MG/DL — SIGNIFICANT CHANGE UP (ref 1.6–2.6)
MAGNESIUM SERPL-MCNC: 2 MG/DL — SIGNIFICANT CHANGE UP (ref 1.6–2.6)
MAGNESIUM SERPL-MCNC: 2 MG/DL — SIGNIFICANT CHANGE UP (ref 1.6–2.6)
MAGNESIUM SERPL-MCNC: 2.3 MG/DL — SIGNIFICANT CHANGE UP (ref 1.6–2.6)
MANUAL DIF COMMENT BLD-IMP: SIGNIFICANT CHANGE UP
MANUAL SMEAR VERIFICATION: SIGNIFICANT CHANGE UP
MCHC RBC-ENTMCNC: 27.1 PG — SIGNIFICANT CHANGE UP (ref 27–34)
MCHC RBC-ENTMCNC: 27.1 PG — SIGNIFICANT CHANGE UP (ref 27–34)
MCHC RBC-ENTMCNC: 27.3 PG — SIGNIFICANT CHANGE UP (ref 27–34)
MCHC RBC-ENTMCNC: 27.4 PG — SIGNIFICANT CHANGE UP (ref 27–34)
MCHC RBC-ENTMCNC: 27.4 PG — SIGNIFICANT CHANGE UP (ref 27–34)
MCHC RBC-ENTMCNC: 27.9 PG — SIGNIFICANT CHANGE UP (ref 27–34)
MCHC RBC-ENTMCNC: 28.1 PG — SIGNIFICANT CHANGE UP (ref 27–34)
MCHC RBC-ENTMCNC: 28.1 PG — SIGNIFICANT CHANGE UP (ref 27–34)
MCHC RBC-ENTMCNC: 28.3 PG — SIGNIFICANT CHANGE UP (ref 27–34)
MCHC RBC-ENTMCNC: 28.4 PG — SIGNIFICANT CHANGE UP (ref 27–34)
MCHC RBC-ENTMCNC: 28.5 PG — SIGNIFICANT CHANGE UP (ref 27–34)
MCHC RBC-ENTMCNC: 28.6 PG — SIGNIFICANT CHANGE UP (ref 27–34)
MCHC RBC-ENTMCNC: 28.7 PG — SIGNIFICANT CHANGE UP (ref 27–34)
MCHC RBC-ENTMCNC: 28.7 PG — SIGNIFICANT CHANGE UP (ref 27–34)
MCHC RBC-ENTMCNC: 28.8 PG — SIGNIFICANT CHANGE UP (ref 27–34)
MCHC RBC-ENTMCNC: 29 PG — SIGNIFICANT CHANGE UP (ref 27–34)
MCHC RBC-ENTMCNC: 29.1 PG — SIGNIFICANT CHANGE UP (ref 27–34)
MCHC RBC-ENTMCNC: 29.1 PG — SIGNIFICANT CHANGE UP (ref 27–34)
MCHC RBC-ENTMCNC: 29.2 PG — SIGNIFICANT CHANGE UP (ref 27–34)
MCHC RBC-ENTMCNC: 29.2 PG — SIGNIFICANT CHANGE UP (ref 27–34)
MCHC RBC-ENTMCNC: 29.3 PG — SIGNIFICANT CHANGE UP (ref 27–34)
MCHC RBC-ENTMCNC: 29.4 PG — SIGNIFICANT CHANGE UP (ref 27–34)
MCHC RBC-ENTMCNC: 29.4 PG — SIGNIFICANT CHANGE UP (ref 27–34)
MCHC RBC-ENTMCNC: 29.6 PG — SIGNIFICANT CHANGE UP (ref 27–34)
MCHC RBC-ENTMCNC: 29.6 PG — SIGNIFICANT CHANGE UP (ref 27–34)
MCHC RBC-ENTMCNC: 29.7 PG — SIGNIFICANT CHANGE UP (ref 27–34)
MCHC RBC-ENTMCNC: 29.8 PG — SIGNIFICANT CHANGE UP (ref 27–34)
MCHC RBC-ENTMCNC: 29.9 PG — SIGNIFICANT CHANGE UP (ref 27–34)
MCHC RBC-ENTMCNC: 29.9 PG — SIGNIFICANT CHANGE UP (ref 27–34)
MCHC RBC-ENTMCNC: 30 PG — SIGNIFICANT CHANGE UP (ref 27–34)
MCHC RBC-ENTMCNC: 30.1 PG — SIGNIFICANT CHANGE UP (ref 27–34)
MCHC RBC-ENTMCNC: 30.1 PG — SIGNIFICANT CHANGE UP (ref 27–34)
MCHC RBC-ENTMCNC: 30.7 PG — SIGNIFICANT CHANGE UP (ref 27–34)
MCHC RBC-ENTMCNC: 30.8 GM/DL — LOW (ref 32–36)
MCHC RBC-ENTMCNC: 31.5 GM/DL — LOW (ref 32–36)
MCHC RBC-ENTMCNC: 32 GM/DL — SIGNIFICANT CHANGE UP (ref 32–36)
MCHC RBC-ENTMCNC: 32.3 GM/DL — SIGNIFICANT CHANGE UP (ref 32–36)
MCHC RBC-ENTMCNC: 32.4 GM/DL — SIGNIFICANT CHANGE UP (ref 32–36)
MCHC RBC-ENTMCNC: 32.4 GM/DL — SIGNIFICANT CHANGE UP (ref 32–36)
MCHC RBC-ENTMCNC: 32.5 GM/DL — SIGNIFICANT CHANGE UP (ref 32–36)
MCHC RBC-ENTMCNC: 32.5 GM/DL — SIGNIFICANT CHANGE UP (ref 32–36)
MCHC RBC-ENTMCNC: 32.6 GM/DL — SIGNIFICANT CHANGE UP (ref 32–36)
MCHC RBC-ENTMCNC: 32.6 GM/DL — SIGNIFICANT CHANGE UP (ref 32–36)
MCHC RBC-ENTMCNC: 32.7 GM/DL — SIGNIFICANT CHANGE UP (ref 32–36)
MCHC RBC-ENTMCNC: 32.9 GM/DL — SIGNIFICANT CHANGE UP (ref 32–36)
MCHC RBC-ENTMCNC: 33 GM/DL — SIGNIFICANT CHANGE UP (ref 32–36)
MCHC RBC-ENTMCNC: 33.1 GM/DL — SIGNIFICANT CHANGE UP (ref 32–36)
MCHC RBC-ENTMCNC: 33.2 GM/DL — SIGNIFICANT CHANGE UP (ref 32–36)
MCHC RBC-ENTMCNC: 33.3 GM/DL — SIGNIFICANT CHANGE UP (ref 32–36)
MCHC RBC-ENTMCNC: 33.3 GM/DL — SIGNIFICANT CHANGE UP (ref 32–36)
MCHC RBC-ENTMCNC: 33.5 GM/DL — SIGNIFICANT CHANGE UP (ref 32–36)
MCHC RBC-ENTMCNC: 33.7 GM/DL — SIGNIFICANT CHANGE UP (ref 32–36)
MCHC RBC-ENTMCNC: 33.9 GM/DL — SIGNIFICANT CHANGE UP (ref 32–36)
MCHC RBC-ENTMCNC: 34.1 GM/DL — SIGNIFICANT CHANGE UP (ref 32–36)
MCHC RBC-ENTMCNC: 34.2 GM/DL — SIGNIFICANT CHANGE UP (ref 32–36)
MCHC RBC-ENTMCNC: 34.2 GM/DL — SIGNIFICANT CHANGE UP (ref 32–36)
MCHC RBC-ENTMCNC: 34.5 GM/DL — SIGNIFICANT CHANGE UP (ref 32–36)
MCHC RBC-ENTMCNC: 35 GM/DL — SIGNIFICANT CHANGE UP (ref 32–36)
MCHC RBC-ENTMCNC: 35 GM/DL — SIGNIFICANT CHANGE UP (ref 32–36)
MCHC RBC-ENTMCNC: 35.2 GM/DL — SIGNIFICANT CHANGE UP (ref 32–36)
MCHC RBC-ENTMCNC: 35.4 GM/DL — SIGNIFICANT CHANGE UP (ref 32–36)
MCHC RBC-ENTMCNC: 35.8 GM/DL — SIGNIFICANT CHANGE UP (ref 32–36)
MCV RBC AUTO: 79.9 FL — LOW (ref 80–100)
MCV RBC AUTO: 81.4 FL — SIGNIFICANT CHANGE UP (ref 80–100)
MCV RBC AUTO: 82.2 FL — SIGNIFICANT CHANGE UP (ref 80–100)
MCV RBC AUTO: 82.3 FL — SIGNIFICANT CHANGE UP (ref 80–100)
MCV RBC AUTO: 83 FL — SIGNIFICANT CHANGE UP (ref 80–100)
MCV RBC AUTO: 83.4 FL — SIGNIFICANT CHANGE UP (ref 80–100)
MCV RBC AUTO: 83.5 FL — SIGNIFICANT CHANGE UP (ref 80–100)
MCV RBC AUTO: 83.9 FL — SIGNIFICANT CHANGE UP (ref 80–100)
MCV RBC AUTO: 84.2 FL — SIGNIFICANT CHANGE UP (ref 80–100)
MCV RBC AUTO: 84.2 FL — SIGNIFICANT CHANGE UP (ref 80–100)
MCV RBC AUTO: 84.8 FL — SIGNIFICANT CHANGE UP (ref 80–100)
MCV RBC AUTO: 84.9 FL — SIGNIFICANT CHANGE UP (ref 80–100)
MCV RBC AUTO: 85 FL — SIGNIFICANT CHANGE UP (ref 80–100)
MCV RBC AUTO: 85.2 FL — SIGNIFICANT CHANGE UP (ref 80–100)
MCV RBC AUTO: 85.2 FL — SIGNIFICANT CHANGE UP (ref 80–100)
MCV RBC AUTO: 85.4 FL — SIGNIFICANT CHANGE UP (ref 80–100)
MCV RBC AUTO: 85.5 FL — SIGNIFICANT CHANGE UP (ref 80–100)
MCV RBC AUTO: 86 FL — SIGNIFICANT CHANGE UP (ref 80–100)
MCV RBC AUTO: 86.1 FL — SIGNIFICANT CHANGE UP (ref 80–100)
MCV RBC AUTO: 86.1 FL — SIGNIFICANT CHANGE UP (ref 80–100)
MCV RBC AUTO: 86.3 FL — SIGNIFICANT CHANGE UP (ref 80–100)
MCV RBC AUTO: 86.5 FL — SIGNIFICANT CHANGE UP (ref 80–100)
MCV RBC AUTO: 86.7 FL — SIGNIFICANT CHANGE UP (ref 80–100)
MCV RBC AUTO: 86.7 FL — SIGNIFICANT CHANGE UP (ref 80–100)
MCV RBC AUTO: 86.8 FL — SIGNIFICANT CHANGE UP (ref 80–100)
MCV RBC AUTO: 87 FL — SIGNIFICANT CHANGE UP (ref 80–100)
MCV RBC AUTO: 87.2 FL — SIGNIFICANT CHANGE UP (ref 80–100)
MCV RBC AUTO: 87.5 FL — SIGNIFICANT CHANGE UP (ref 80–100)
MCV RBC AUTO: 87.6 FL — SIGNIFICANT CHANGE UP (ref 80–100)
MCV RBC AUTO: 87.8 FL — SIGNIFICANT CHANGE UP (ref 80–100)
MCV RBC AUTO: 87.9 FL — SIGNIFICANT CHANGE UP (ref 80–100)
MCV RBC AUTO: 88 FL — SIGNIFICANT CHANGE UP (ref 80–100)
MCV RBC AUTO: 88 FL — SIGNIFICANT CHANGE UP (ref 80–100)
MCV RBC AUTO: 88.3 FL — SIGNIFICANT CHANGE UP (ref 80–100)
MCV RBC AUTO: 88.6 FL — SIGNIFICANT CHANGE UP (ref 80–100)
MCV RBC AUTO: 89.3 FL — SIGNIFICANT CHANGE UP (ref 80–100)
MCV RBC AUTO: 90.5 FL — SIGNIFICANT CHANGE UP (ref 80–100)
MCV RBC AUTO: 92.9 FL — SIGNIFICANT CHANGE UP (ref 80–100)
METAMYELOCYTES # FLD: 1 % — HIGH (ref 0–0)
METAMYELOCYTES # FLD: 2 % — HIGH (ref 0–0)
METAMYELOCYTES # FLD: 2 % — HIGH (ref 0–0)
METHOD TYPE: SIGNIFICANT CHANGE UP
MICROCYTES BLD QL: SLIGHT — SIGNIFICANT CHANGE UP
MONOCYTES # BLD AUTO: 10.66 K/UL — HIGH (ref 0–0.9)
MONOCYTES # BLD AUTO: 14.76 K/UL — HIGH (ref 0–0.9)
MONOCYTES # BLD AUTO: 16.11 K/UL — HIGH (ref 0–0.9)
MONOCYTES # BLD AUTO: 19.33 K/UL — HIGH (ref 0–0.9)
MONOCYTES # BLD AUTO: 20.75 K/UL — HIGH (ref 0–0.9)
MONOCYTES # BLD AUTO: 5.84 K/UL — HIGH (ref 0–0.9)
MONOCYTES # BLD AUTO: 8.04 K/UL — HIGH (ref 0–0.9)
MONOCYTES # BLD AUTO: 9.05 K/UL — HIGH (ref 0–0.9)
MONOCYTES # BLD AUTO: 9.32 K/UL — HIGH (ref 0–0.9)
MONOCYTES NFR BLD AUTO: 11.8 % — SIGNIFICANT CHANGE UP (ref 2–14)
MONOCYTES NFR BLD AUTO: 17.8 % — HIGH (ref 2–14)
MONOCYTES NFR BLD AUTO: 22 % — HIGH (ref 2–14)
MONOCYTES NFR BLD AUTO: 27 % — HIGH (ref 2–14)
MONOCYTES NFR BLD AUTO: 30 % — HIGH (ref 2–14)
MONOCYTES NFR BLD AUTO: 30 % — HIGH (ref 2–14)
MONOCYTES NFR BLD AUTO: 31 % — HIGH (ref 2–14)
MONOCYTES NFR BLD AUTO: 36 % — HIGH (ref 2–14)
MONOCYTES NFR BLD AUTO: 38 % — HIGH (ref 2–14)
MYELOCYTES NFR BLD: 3 % — HIGH (ref 0–0)
MYELOCYTES NFR BLD: 4.5 % — HIGH (ref 0–0)
MYELOCYTES NFR BLD: 6 % — HIGH (ref 0–0)
MYELOCYTES NFR BLD: 7 % — HIGH (ref 0–0)
MYELOCYTES NFR BLD: 9 % — HIGH (ref 0–0)
MYELOPEROXIDASE AB SER-ACNC: <5 UNITS — SIGNIFICANT CHANGE UP
MYELOPEROXIDASE CELLS FLD QL: NEGATIVE — SIGNIFICANT CHANGE UP
NEUTROPHILS # BLD AUTO: 11.46 K/UL — HIGH (ref 1.8–7.4)
NEUTROPHILS # BLD AUTO: 12.89 K/UL — HIGH (ref 1.8–7.4)
NEUTROPHILS # BLD AUTO: 13.52 K/UL — HIGH (ref 1.8–7.4)
NEUTROPHILS # BLD AUTO: 13.57 K/UL — HIGH (ref 1.8–7.4)
NEUTROPHILS # BLD AUTO: 14.21 K/UL — HIGH (ref 1.8–7.4)
NEUTROPHILS # BLD AUTO: 14.77 K/UL — HIGH (ref 1.8–7.4)
NEUTROPHILS # BLD AUTO: 16.74 K/UL — HIGH (ref 1.8–7.4)
NEUTROPHILS # BLD AUTO: 4.36 K/UL — SIGNIFICANT CHANGE UP (ref 1.8–7.4)
NEUTROPHILS # BLD AUTO: 4.97 K/UL — SIGNIFICANT CHANGE UP (ref 1.8–7.4)
NEUTROPHILS NFR BLD AUTO: 14 % — LOW (ref 43–77)
NEUTROPHILS NFR BLD AUTO: 18 % — LOW (ref 43–77)
NEUTROPHILS NFR BLD AUTO: 22 % — LOW (ref 43–77)
NEUTROPHILS NFR BLD AUTO: 24 % — LOW (ref 43–77)
NEUTROPHILS NFR BLD AUTO: 27.3 % — LOW (ref 43–77)
NEUTROPHILS NFR BLD AUTO: 29 % — LOW (ref 43–77)
NEUTROPHILS NFR BLD AUTO: 31 % — LOW (ref 43–77)
NEUTROPHILS NFR BLD AUTO: 31.8 % — LOW (ref 43–77)
NEUTROPHILS NFR BLD AUTO: 9 % — LOW (ref 43–77)
NEUTS BAND # BLD: 2 % — SIGNIFICANT CHANGE UP (ref 0–8)
NEUTS BAND # BLD: 3 % — SIGNIFICANT CHANGE UP (ref 0–8)
NEUTS BAND # BLD: 6 % — SIGNIFICANT CHANGE UP (ref 0–8)
NITRITE UR-MCNC: NEGATIVE — SIGNIFICANT CHANGE UP
NRBC # BLD: 0 /100 WBCS — SIGNIFICANT CHANGE UP (ref 0–0)
NRBC # BLD: 0 /100 — SIGNIFICANT CHANGE UP (ref 0–0)
NRBC # BLD: 0 /100 — SIGNIFICANT CHANGE UP (ref 0–0)
NRBC # BLD: 1 /100 WBCS — HIGH (ref 0–0)
NRBC # BLD: 1 /100 — HIGH (ref 0–0)
NRBC # BLD: 2 /100 — HIGH (ref 0–0)
NRBC # BLD: 3 /100 — HIGH (ref 0–0)
OB PNL STL: NEGATIVE — SIGNIFICANT CHANGE UP
ORGANISM # SPEC MICROSCOPIC CNT: SIGNIFICANT CHANGE UP
OSMOLALITY UR: 814 MOS/KG — SIGNIFICANT CHANGE UP (ref 300–900)
PCO2 BLDA: 28 MMHG — LOW (ref 32–45)
PCO2 BLDV: 30 MMHG — LOW (ref 39–42)
PH BLDA: 7.44 — SIGNIFICANT CHANGE UP (ref 7.35–7.45)
PH BLDV: 7.46 — HIGH (ref 7.32–7.43)
PH UR: 7 — SIGNIFICANT CHANGE UP (ref 5–8)
PH UR: 7.5 — SIGNIFICANT CHANGE UP (ref 5–8)
PH UR: 7.5 — SIGNIFICANT CHANGE UP (ref 5–8)
PHOSPHATE SERPL-MCNC: 1.9 MG/DL — LOW (ref 2.5–4.5)
PHOSPHATE SERPL-MCNC: 2.2 MG/DL — LOW (ref 2.5–4.5)
PHOSPHATE SERPL-MCNC: 2.3 MG/DL — LOW (ref 2.5–4.5)
PHOSPHATE SERPL-MCNC: 2.4 MG/DL — LOW (ref 2.5–4.5)
PHOSPHATE SERPL-MCNC: 2.4 MG/DL — LOW (ref 2.5–4.5)
PHOSPHATE SERPL-MCNC: 2.5 MG/DL — SIGNIFICANT CHANGE UP (ref 2.5–4.5)
PHOSPHATE SERPL-MCNC: 2.8 MG/DL — SIGNIFICANT CHANGE UP (ref 2.5–4.5)
PHOSPHATE SERPL-MCNC: 3 MG/DL — SIGNIFICANT CHANGE UP (ref 2.5–4.5)
PHOSPHATE SERPL-MCNC: 3.6 MG/DL — SIGNIFICANT CHANGE UP (ref 2.5–4.5)
PHOSPHATE SERPL-MCNC: 3.8 MG/DL — SIGNIFICANT CHANGE UP (ref 2.5–4.5)
PLAT MORPH BLD: NORMAL — SIGNIFICANT CHANGE UP
PLATELET # BLD AUTO: 10 K/UL — CRITICAL LOW (ref 150–400)
PLATELET # BLD AUTO: 11 K/UL — CRITICAL LOW (ref 150–400)
PLATELET # BLD AUTO: 11 K/UL — CRITICAL LOW (ref 150–400)
PLATELET # BLD AUTO: 12 K/UL — CRITICAL LOW (ref 150–400)
PLATELET # BLD AUTO: 13 K/UL — CRITICAL LOW (ref 150–400)
PLATELET # BLD AUTO: 14 K/UL — CRITICAL LOW (ref 150–400)
PLATELET # BLD AUTO: 18 K/UL — CRITICAL LOW (ref 150–400)
PLATELET # BLD AUTO: 18 K/UL — CRITICAL LOW (ref 150–400)
PLATELET # BLD AUTO: 21 K/UL — LOW (ref 150–400)
PLATELET # BLD AUTO: 24 K/UL — LOW (ref 150–400)
PLATELET # BLD AUTO: 24 K/UL — LOW (ref 150–400)
PLATELET # BLD AUTO: 26 K/UL — LOW (ref 150–400)
PLATELET # BLD AUTO: 30 K/UL — LOW (ref 150–400)
PLATELET # BLD AUTO: 32 K/UL — LOW (ref 150–400)
PLATELET # BLD AUTO: 33 K/UL — LOW (ref 150–400)
PLATELET # BLD AUTO: 33 K/UL — LOW (ref 150–400)
PLATELET # BLD AUTO: 34 K/UL — LOW (ref 150–400)
PLATELET # BLD AUTO: 35 K/UL — LOW (ref 150–400)
PLATELET # BLD AUTO: 35 K/UL — LOW (ref 150–400)
PLATELET # BLD AUTO: 37 K/UL — LOW (ref 150–400)
PLATELET # BLD AUTO: 4 K/UL — CRITICAL LOW (ref 150–400)
PLATELET # BLD AUTO: 4 K/UL — CRITICAL LOW (ref 150–400)
PLATELET # BLD AUTO: 43 K/UL — LOW (ref 150–400)
PLATELET # BLD AUTO: 5 K/UL — CRITICAL LOW (ref 150–400)
PLATELET # BLD AUTO: 52 K/UL — LOW (ref 150–400)
PLATELET # BLD AUTO: 56 K/UL — LOW (ref 150–400)
PLATELET # BLD AUTO: 68 K/UL — LOW (ref 150–400)
PLATELET # BLD AUTO: 7 K/UL — CRITICAL LOW (ref 150–400)
PLATELET # BLD AUTO: 79 K/UL — LOW (ref 150–400)
PLATELET # BLD AUTO: 8 K/UL — CRITICAL LOW (ref 150–400)
PLATELET # BLD AUTO: 84 K/UL — LOW (ref 150–400)
PO2 BLDA: 74 MMHG — LOW (ref 83–108)
PO2 BLDV: 30 MMHG — SIGNIFICANT CHANGE UP (ref 25–45)
POLYCHROMASIA BLD QL SMEAR: SLIGHT — SIGNIFICANT CHANGE UP
POTASSIUM BLDV-SCNC: 4.3 MMOL/L — SIGNIFICANT CHANGE UP (ref 3.5–5.1)
POTASSIUM SERPL-MCNC: 2.9 MMOL/L — CRITICAL LOW (ref 3.5–5.3)
POTASSIUM SERPL-MCNC: 3.1 MMOL/L — LOW (ref 3.5–5.3)
POTASSIUM SERPL-MCNC: 3.1 MMOL/L — LOW (ref 3.5–5.3)
POTASSIUM SERPL-MCNC: 3.2 MMOL/L — LOW (ref 3.5–5.3)
POTASSIUM SERPL-MCNC: 3.3 MMOL/L — LOW (ref 3.5–5.3)
POTASSIUM SERPL-MCNC: 3.4 MMOL/L — LOW (ref 3.5–5.3)
POTASSIUM SERPL-MCNC: 3.4 MMOL/L — LOW (ref 3.5–5.3)
POTASSIUM SERPL-MCNC: 3.5 MMOL/L — SIGNIFICANT CHANGE UP (ref 3.5–5.3)
POTASSIUM SERPL-MCNC: 3.6 MMOL/L — SIGNIFICANT CHANGE UP (ref 3.5–5.3)
POTASSIUM SERPL-MCNC: 3.7 MMOL/L — SIGNIFICANT CHANGE UP (ref 3.5–5.3)
POTASSIUM SERPL-MCNC: 3.7 MMOL/L — SIGNIFICANT CHANGE UP (ref 3.5–5.3)
POTASSIUM SERPL-MCNC: 3.8 MMOL/L — SIGNIFICANT CHANGE UP (ref 3.5–5.3)
POTASSIUM SERPL-MCNC: 3.9 MMOL/L — SIGNIFICANT CHANGE UP (ref 3.5–5.3)
POTASSIUM SERPL-MCNC: 3.9 MMOL/L — SIGNIFICANT CHANGE UP (ref 3.5–5.3)
POTASSIUM SERPL-MCNC: 4 MMOL/L — SIGNIFICANT CHANGE UP (ref 3.5–5.3)
POTASSIUM SERPL-MCNC: 4 MMOL/L — SIGNIFICANT CHANGE UP (ref 3.5–5.3)
POTASSIUM SERPL-MCNC: 4.1 MMOL/L — SIGNIFICANT CHANGE UP (ref 3.5–5.3)
POTASSIUM SERPL-MCNC: 4.2 MMOL/L — SIGNIFICANT CHANGE UP (ref 3.5–5.3)
POTASSIUM SERPL-MCNC: 4.3 MMOL/L — SIGNIFICANT CHANGE UP (ref 3.5–5.3)
POTASSIUM SERPL-MCNC: 4.5 MMOL/L — SIGNIFICANT CHANGE UP (ref 3.5–5.3)
POTASSIUM SERPL-MCNC: 4.6 MMOL/L — SIGNIFICANT CHANGE UP (ref 3.5–5.3)
POTASSIUM SERPL-MCNC: 4.7 MMOL/L — SIGNIFICANT CHANGE UP (ref 3.5–5.3)
POTASSIUM SERPL-MCNC: 4.9 MMOL/L — SIGNIFICANT CHANGE UP (ref 3.5–5.3)
POTASSIUM SERPL-MCNC: 4.9 MMOL/L — SIGNIFICANT CHANGE UP (ref 3.5–5.3)
POTASSIUM SERPL-SCNC: 2.9 MMOL/L — CRITICAL LOW (ref 3.5–5.3)
POTASSIUM SERPL-SCNC: 3.1 MMOL/L — LOW (ref 3.5–5.3)
POTASSIUM SERPL-SCNC: 3.1 MMOL/L — LOW (ref 3.5–5.3)
POTASSIUM SERPL-SCNC: 3.2 MMOL/L — LOW (ref 3.5–5.3)
POTASSIUM SERPL-SCNC: 3.3 MMOL/L — LOW (ref 3.5–5.3)
POTASSIUM SERPL-SCNC: 3.4 MMOL/L — LOW (ref 3.5–5.3)
POTASSIUM SERPL-SCNC: 3.4 MMOL/L — LOW (ref 3.5–5.3)
POTASSIUM SERPL-SCNC: 3.5 MMOL/L — SIGNIFICANT CHANGE UP (ref 3.5–5.3)
POTASSIUM SERPL-SCNC: 3.6 MMOL/L — SIGNIFICANT CHANGE UP (ref 3.5–5.3)
POTASSIUM SERPL-SCNC: 3.7 MMOL/L — SIGNIFICANT CHANGE UP (ref 3.5–5.3)
POTASSIUM SERPL-SCNC: 3.7 MMOL/L — SIGNIFICANT CHANGE UP (ref 3.5–5.3)
POTASSIUM SERPL-SCNC: 3.8 MMOL/L — SIGNIFICANT CHANGE UP (ref 3.5–5.3)
POTASSIUM SERPL-SCNC: 3.9 MMOL/L — SIGNIFICANT CHANGE UP (ref 3.5–5.3)
POTASSIUM SERPL-SCNC: 3.9 MMOL/L — SIGNIFICANT CHANGE UP (ref 3.5–5.3)
POTASSIUM SERPL-SCNC: 4 MMOL/L — SIGNIFICANT CHANGE UP (ref 3.5–5.3)
POTASSIUM SERPL-SCNC: 4 MMOL/L — SIGNIFICANT CHANGE UP (ref 3.5–5.3)
POTASSIUM SERPL-SCNC: 4.1 MMOL/L — SIGNIFICANT CHANGE UP (ref 3.5–5.3)
POTASSIUM SERPL-SCNC: 4.2 MMOL/L — SIGNIFICANT CHANGE UP (ref 3.5–5.3)
POTASSIUM SERPL-SCNC: 4.3 MMOL/L — SIGNIFICANT CHANGE UP (ref 3.5–5.3)
POTASSIUM SERPL-SCNC: 4.5 MMOL/L — SIGNIFICANT CHANGE UP (ref 3.5–5.3)
POTASSIUM SERPL-SCNC: 4.6 MMOL/L — SIGNIFICANT CHANGE UP (ref 3.5–5.3)
POTASSIUM SERPL-SCNC: 4.7 MMOL/L — SIGNIFICANT CHANGE UP (ref 3.5–5.3)
POTASSIUM SERPL-SCNC: 4.9 MMOL/L — SIGNIFICANT CHANGE UP (ref 3.5–5.3)
POTASSIUM SERPL-SCNC: 4.9 MMOL/L — SIGNIFICANT CHANGE UP (ref 3.5–5.3)
PROCALCITONIN SERPL-MCNC: 0.16 NG/ML — HIGH (ref 0.02–0.1)
PROMYELOCYTES # FLD: 13 % — HIGH (ref 0–0)
PROMYELOCYTES # FLD: 23 % — HIGH (ref 0–0)
PROMYELOCYTES # FLD: 5 % — HIGH (ref 0–0)
PROMYELOCYTES # FLD: 6.4 % — HIGH (ref 0–0)
PROMYELOCYTES # FLD: 7 % — HIGH (ref 0–0)
PROT ?TM UR-MCNC: 122 MG/DL — HIGH (ref 0–12)
PROT SERPL-MCNC: 5.3 G/DL — LOW (ref 6–8.3)
PROT SERPL-MCNC: 5.4 G/DL — LOW (ref 6–8.3)
PROT SERPL-MCNC: 5.6 G/DL — LOW (ref 6–8.3)
PROT SERPL-MCNC: 5.8 G/DL — LOW (ref 6–8.3)
PROT SERPL-MCNC: 5.9 G/DL — LOW (ref 6–8.3)
PROT SERPL-MCNC: 5.9 G/DL — LOW (ref 6–8.3)
PROT SERPL-MCNC: 6 G/DL — SIGNIFICANT CHANGE UP (ref 6–8.3)
PROT SERPL-MCNC: 6.1 G/DL — SIGNIFICANT CHANGE UP (ref 6–8.3)
PROT SERPL-MCNC: 6.2 G/DL — SIGNIFICANT CHANGE UP (ref 6–8.3)
PROT SERPL-MCNC: 6.3 G/DL — SIGNIFICANT CHANGE UP (ref 6–8.3)
PROT SERPL-MCNC: 6.4 G/DL — SIGNIFICANT CHANGE UP (ref 6–8.3)
PROT SERPL-MCNC: 6.5 G/DL — SIGNIFICANT CHANGE UP (ref 6–8.3)
PROT SERPL-MCNC: 6.5 G/DL — SIGNIFICANT CHANGE UP (ref 6–8.3)
PROT UR-MCNC: ABNORMAL
PROT/CREAT UR-RTO: 1.5 RATIO — HIGH (ref 0–0.2)
PROTEINASE3 AB FLD-ACNC: <5 UNITS — SIGNIFICANT CHANGE UP
PROTEINASE3 AB SER-ACNC: NEGATIVE — SIGNIFICANT CHANGE UP
PROTHROM AB SERPL-ACNC: 13.8 SEC — HIGH (ref 10.5–13.4)
PROTHROM AB SERPL-ACNC: 17 SEC — HIGH (ref 10.5–13.4)
QUANT TB PLUS MITOGEN MINUS NIL: 0 IU/ML — SIGNIFICANT CHANGE UP
RAPID RVP RESULT: SIGNIFICANT CHANGE UP
RBC # BLD: 1.96 M/UL — LOW (ref 3.8–5.2)
RBC # BLD: 2.03 M/UL — LOW (ref 3.8–5.2)
RBC # BLD: 2.11 M/UL — LOW (ref 3.8–5.2)
RBC # BLD: 2.12 M/UL — LOW (ref 3.8–5.2)
RBC # BLD: 2.14 M/UL — LOW (ref 3.8–5.2)
RBC # BLD: 2.18 M/UL — LOW (ref 3.8–5.2)
RBC # BLD: 2.23 M/UL — LOW (ref 3.8–5.2)
RBC # BLD: 2.31 M/UL — LOW (ref 3.8–5.2)
RBC # BLD: 2.41 M/UL — LOW (ref 3.8–5.2)
RBC # BLD: 2.45 M/UL — LOW (ref 3.8–5.2)
RBC # BLD: 2.48 M/UL — LOW (ref 3.8–5.2)
RBC # BLD: 2.5 M/UL — LOW (ref 3.8–5.2)
RBC # BLD: 2.5 M/UL — LOW (ref 3.8–5.2)
RBC # BLD: 2.51 M/UL — LOW (ref 3.8–5.2)
RBC # BLD: 2.51 M/UL — LOW (ref 3.8–5.2)
RBC # BLD: 2.6 M/UL — LOW (ref 3.8–5.2)
RBC # BLD: 2.65 M/UL — LOW (ref 3.8–5.2)
RBC # BLD: 2.7 M/UL — LOW (ref 3.8–5.2)
RBC # BLD: 2.7 M/UL — LOW (ref 3.8–5.2)
RBC # BLD: 2.74 M/UL — LOW (ref 3.8–5.2)
RBC # BLD: 2.86 M/UL — LOW (ref 3.8–5.2)
RBC # BLD: 3.05 M/UL — LOW (ref 3.8–5.2)
RBC # BLD: 3.07 M/UL — LOW (ref 3.8–5.2)
RBC # BLD: 3.1 M/UL — LOW (ref 3.8–5.2)
RBC # BLD: 3.1 M/UL — LOW (ref 3.8–5.2)
RBC # BLD: 3.15 M/UL — LOW (ref 3.8–5.2)
RBC # BLD: 3.15 M/UL — LOW (ref 3.8–5.2)
RBC # BLD: 3.22 M/UL — LOW (ref 3.8–5.2)
RBC # BLD: 3.32 M/UL — LOW (ref 3.8–5.2)
RBC # BLD: 3.34 M/UL — LOW (ref 3.8–5.2)
RBC # BLD: 3.46 M/UL — LOW (ref 3.8–5.2)
RBC # BLD: 3.48 M/UL — LOW (ref 3.8–5.2)
RBC # BLD: 3.5 M/UL — LOW (ref 3.8–5.2)
RBC # BLD: 3.66 M/UL — LOW (ref 3.8–5.2)
RBC # BLD: 3.69 M/UL — LOW (ref 3.8–5.2)
RBC # BLD: 3.73 M/UL — LOW (ref 3.8–5.2)
RBC # BLD: 3.86 M/UL — SIGNIFICANT CHANGE UP (ref 3.8–5.2)
RBC # BLD: 4.1 M/UL — SIGNIFICANT CHANGE UP (ref 3.8–5.2)
RBC # BLD: 4.19 M/UL — SIGNIFICANT CHANGE UP (ref 3.8–5.2)
RBC # FLD: 14.5 % — SIGNIFICANT CHANGE UP (ref 10.3–14.5)
RBC # FLD: 14.7 % — HIGH (ref 10.3–14.5)
RBC # FLD: 14.9 % — HIGH (ref 10.3–14.5)
RBC # FLD: 15.1 % — HIGH (ref 10.3–14.5)
RBC # FLD: 15.2 % — HIGH (ref 10.3–14.5)
RBC # FLD: 15.6 % — HIGH (ref 10.3–14.5)
RBC # FLD: 15.9 % — HIGH (ref 10.3–14.5)
RBC # FLD: 15.9 % — HIGH (ref 10.3–14.5)
RBC # FLD: 16 % — HIGH (ref 10.3–14.5)
RBC # FLD: 16.1 % — HIGH (ref 10.3–14.5)
RBC # FLD: 16.1 % — HIGH (ref 10.3–14.5)
RBC # FLD: 16.2 % — HIGH (ref 10.3–14.5)
RBC # FLD: 16.2 % — HIGH (ref 10.3–14.5)
RBC # FLD: 16.6 % — HIGH (ref 10.3–14.5)
RBC # FLD: 17.1 % — HIGH (ref 10.3–14.5)
RBC # FLD: 17.1 % — HIGH (ref 10.3–14.5)
RBC # FLD: 17.3 % — HIGH (ref 10.3–14.5)
RBC # FLD: 17.5 % — HIGH (ref 10.3–14.5)
RBC # FLD: 17.6 % — HIGH (ref 10.3–14.5)
RBC # FLD: 17.6 % — HIGH (ref 10.3–14.5)
RBC # FLD: 17.9 % — HIGH (ref 10.3–14.5)
RBC # FLD: 18 % — HIGH (ref 10.3–14.5)
RBC # FLD: 18.2 % — HIGH (ref 10.3–14.5)
RBC # FLD: 18.3 % — HIGH (ref 10.3–14.5)
RBC # FLD: 18.4 % — HIGH (ref 10.3–14.5)
RBC # FLD: 18.6 % — HIGH (ref 10.3–14.5)
RBC # FLD: 18.8 % — HIGH (ref 10.3–14.5)
RBC # FLD: 20.6 % — HIGH (ref 10.3–14.5)
RBC # FLD: 20.9 % — HIGH (ref 10.3–14.5)
RBC # FLD: 21 % — HIGH (ref 10.3–14.5)
RBC # FLD: 21.6 % — HIGH (ref 10.3–14.5)
RBC # FLD: 22.5 % — HIGH (ref 10.3–14.5)
RBC # FLD: 23.4 % — HIGH (ref 10.3–14.5)
RBC # FLD: 23.6 % — HIGH (ref 10.3–14.5)
RBC BLD AUTO: ABNORMAL
RBC BLD AUTO: SIGNIFICANT CHANGE UP
RBC CASTS # UR COMP ASSIST: 3 /HPF — SIGNIFICANT CHANGE UP (ref 0–4)
RBC CASTS # UR COMP ASSIST: 4 /HPF — SIGNIFICANT CHANGE UP (ref 0–4)
RBC CASTS # UR COMP ASSIST: 6 /HPF — HIGH (ref 0–4)
RETICS #: 19.4 K/UL — LOW (ref 25–125)
RETICS/RBC NFR: 0.5 % — SIGNIFICANT CHANGE UP (ref 0.5–2.5)
RH IG SCN BLD-IMP: POSITIVE — SIGNIFICANT CHANGE UP
RSV RNA SPEC QL NAA+PROBE: SIGNIFICANT CHANGE UP
RV+EV RNA SPEC QL NAA+PROBE: SIGNIFICANT CHANGE UP
SAO2 % BLDA: 97.5 % — SIGNIFICANT CHANGE UP (ref 94–98)
SAO2 % BLDV: 61.1 % — LOW (ref 67–88)
SARS-COV-2 RNA SPEC QL NAA+PROBE: SIGNIFICANT CHANGE UP
SMUDGE CELLS # BLD: PRESENT — SIGNIFICANT CHANGE UP
SODIUM SERPL-SCNC: 127 MMOL/L — LOW (ref 135–145)
SODIUM SERPL-SCNC: 133 MMOL/L — LOW (ref 135–145)
SODIUM SERPL-SCNC: 134 MMOL/L — LOW (ref 135–145)
SODIUM SERPL-SCNC: 134 MMOL/L — LOW (ref 135–145)
SODIUM SERPL-SCNC: 135 MMOL/L — SIGNIFICANT CHANGE UP (ref 135–145)
SODIUM SERPL-SCNC: 135 MMOL/L — SIGNIFICANT CHANGE UP (ref 135–145)
SODIUM SERPL-SCNC: 136 MMOL/L — SIGNIFICANT CHANGE UP (ref 135–145)
SODIUM SERPL-SCNC: 137 MMOL/L — SIGNIFICANT CHANGE UP (ref 135–145)
SODIUM SERPL-SCNC: 138 MMOL/L — SIGNIFICANT CHANGE UP (ref 135–145)
SODIUM SERPL-SCNC: 139 MMOL/L — SIGNIFICANT CHANGE UP (ref 135–145)
SODIUM SERPL-SCNC: 140 MMOL/L — SIGNIFICANT CHANGE UP (ref 135–145)
SODIUM SERPL-SCNC: 141 MMOL/L — SIGNIFICANT CHANGE UP (ref 135–145)
SODIUM SERPL-SCNC: 141 MMOL/L — SIGNIFICANT CHANGE UP (ref 135–145)
SODIUM SERPL-SCNC: 142 MMOL/L — SIGNIFICANT CHANGE UP (ref 135–145)
SODIUM SERPL-SCNC: 142 MMOL/L — SIGNIFICANT CHANGE UP (ref 135–145)
SODIUM SERPL-SCNC: 144 MMOL/L — SIGNIFICANT CHANGE UP (ref 135–145)
SODIUM UR-SCNC: 169 MMOL/L — SIGNIFICANT CHANGE UP
SP GR SPEC: 1.02 — SIGNIFICANT CHANGE UP (ref 1.01–1.02)
SP GR SPEC: 1.03 — HIGH (ref 1.01–1.02)
SP GR SPEC: 1.03 — HIGH (ref 1.01–1.02)
SPECIMEN SOURCE: SIGNIFICANT CHANGE UP
T-CELL CD4 SUBSET PNL BLD: 1453 CELLS/UL — HIGH (ref 325–1251)
T-CELL CD4 SUBSET PNL BLD: 960 CELLS/UL — SIGNIFICANT CHANGE UP (ref 325–1251)
TIBC SERPL-MCNC: 180 UG/DL — LOW (ref 220–430)
TM INTERPRETATION: SIGNIFICANT CHANGE UP
TM INTERPRETATION: SIGNIFICANT CHANGE UP
TRI-PHOS CRY UR QL COMP ASSIST: ABNORMAL
UIBC SERPL-MCNC: 28 UG/DL — LOW (ref 110–370)
URATE SERPL-MCNC: 4.4 MG/DL — SIGNIFICANT CHANGE UP (ref 2.5–7)
URATE SERPL-MCNC: 4.8 MG/DL — SIGNIFICANT CHANGE UP (ref 2.5–7)
URATE SERPL-MCNC: 5.7 MG/DL — SIGNIFICANT CHANGE UP (ref 2.5–7)
URATE SERPL-MCNC: 5.7 MG/DL — SIGNIFICANT CHANGE UP (ref 2.5–7)
URATE SERPL-MCNC: 6 MG/DL — SIGNIFICANT CHANGE UP (ref 2.5–7)
UROBILINOGEN FLD QL: NEGATIVE — SIGNIFICANT CHANGE UP
VANCOMYCIN FLD-MCNC: 17.9 UG/ML — SIGNIFICANT CHANGE UP
VANCOMYCIN FLD-MCNC: 29.2 UG/ML
VANCOMYCIN TROUGH SERPL-MCNC: 17.4 UG/ML — SIGNIFICANT CHANGE UP (ref 10–20)
VANCOMYCIN TROUGH SERPL-MCNC: 9.5 UG/ML — LOW (ref 10–20)
VARIANT LYMPHS # BLD: 1 % — SIGNIFICANT CHANGE UP (ref 0–6)
VARIANT LYMPHS # BLD: 5 % — SIGNIFICANT CHANGE UP (ref 0–6)
VIT B12 SERPL-MCNC: >2000 PG/ML — HIGH (ref 232–1245)
WBC # BLD: 19.05 K/UL — HIGH (ref 3.8–10.5)
WBC # BLD: 22.3 K/UL — HIGH (ref 3.8–10.5)
WBC # BLD: 28.03 K/UL — HIGH (ref 3.8–10.5)
WBC # BLD: 28.48 K/UL — HIGH (ref 3.8–10.5)
WBC # BLD: 28.78 K/UL — HIGH (ref 3.8–10.5)
WBC # BLD: 28.99 K/UL — HIGH (ref 3.8–10.5)
WBC # BLD: 29.33 K/UL — HIGH (ref 3.8–10.5)
WBC # BLD: 30.15 K/UL — HIGH (ref 3.8–10.5)
WBC # BLD: 31.08 K/UL — HIGH (ref 3.8–10.5)
WBC # BLD: 31.79 K/UL — HIGH (ref 3.8–10.5)
WBC # BLD: 33.45 K/UL — HIGH (ref 3.8–10.5)
WBC # BLD: 33.58 K/UL — HIGH (ref 3.8–10.5)
WBC # BLD: 35.77 K/UL — HIGH (ref 3.8–10.5)
WBC # BLD: 39.93 K/UL — HIGH (ref 3.8–10.5)
WBC # BLD: 40.74 K/UL — CRITICAL HIGH (ref 3.8–10.5)
WBC # BLD: 42.4 K/UL — CRITICAL HIGH (ref 3.8–10.5)
WBC # BLD: 42.59 K/UL — CRITICAL HIGH (ref 3.8–10.5)
WBC # BLD: 44.17 K/UL — CRITICAL HIGH (ref 3.8–10.5)
WBC # BLD: 45.18 K/UL — CRITICAL HIGH (ref 3.8–10.5)
WBC # BLD: 45.24 K/UL — CRITICAL HIGH (ref 3.8–10.5)
WBC # BLD: 46.62 K/UL — CRITICAL HIGH (ref 3.8–10.5)
WBC # BLD: 47.65 K/UL — CRITICAL HIGH (ref 3.8–10.5)
WBC # BLD: 48.45 K/UL — CRITICAL HIGH (ref 3.8–10.5)
WBC # BLD: 49 K/UL — CRITICAL HIGH (ref 3.8–10.5)
WBC # BLD: 49.53 K/UL — CRITICAL HIGH (ref 3.8–10.5)
WBC # BLD: 49.81 K/UL — CRITICAL HIGH (ref 3.8–10.5)
WBC # BLD: 53.7 K/UL — CRITICAL HIGH (ref 3.8–10.5)
WBC # BLD: 54.66 K/UL — CRITICAL HIGH (ref 3.8–10.5)
WBC # BLD: 54.99 K/UL — CRITICAL HIGH (ref 3.8–10.5)
WBC # BLD: 55.63 K/UL — CRITICAL HIGH (ref 3.8–10.5)
WBC # BLD: 57.56 K/UL — CRITICAL HIGH (ref 3.8–10.5)
WBC # BLD: 60.71 K/UL — CRITICAL HIGH (ref 3.8–10.5)
WBC # BLD: 61.18 K/UL — CRITICAL HIGH (ref 3.8–10.5)
WBC # BLD: 62.29 K/UL — CRITICAL HIGH (ref 3.8–10.5)
WBC # BLD: 64.59 K/UL — CRITICAL HIGH (ref 3.8–10.5)
WBC # BLD: 66.95 K/UL — CRITICAL HIGH (ref 3.8–10.5)
WBC # BLD: 70.94 K/UL — CRITICAL HIGH (ref 3.8–10.5)
WBC # BLD: 73.31 K/UL — CRITICAL HIGH (ref 3.8–10.5)
WBC # BLD: 73.44 K/UL — CRITICAL HIGH (ref 3.8–10.5)
WBC # BLD: 74.2 K/UL — CRITICAL HIGH (ref 3.8–10.5)
WBC # FLD AUTO: 19.05 K/UL — HIGH (ref 3.8–10.5)
WBC # FLD AUTO: 22.3 K/UL — HIGH (ref 3.8–10.5)
WBC # FLD AUTO: 28.03 K/UL — HIGH (ref 3.8–10.5)
WBC # FLD AUTO: 28.48 K/UL — HIGH (ref 3.8–10.5)
WBC # FLD AUTO: 28.78 K/UL — HIGH (ref 3.8–10.5)
WBC # FLD AUTO: 28.99 K/UL — HIGH (ref 3.8–10.5)
WBC # FLD AUTO: 29.33 K/UL — HIGH (ref 3.8–10.5)
WBC # FLD AUTO: 30.15 K/UL — HIGH (ref 3.8–10.5)
WBC # FLD AUTO: 31.08 K/UL — HIGH (ref 3.8–10.5)
WBC # FLD AUTO: 31.79 K/UL — HIGH (ref 3.8–10.5)
WBC # FLD AUTO: 33.45 K/UL — HIGH (ref 3.8–10.5)
WBC # FLD AUTO: 33.58 K/UL — HIGH (ref 3.8–10.5)
WBC # FLD AUTO: 35.77 K/UL — HIGH (ref 3.8–10.5)
WBC # FLD AUTO: 39.93 K/UL — HIGH (ref 3.8–10.5)
WBC # FLD AUTO: 40.74 K/UL — CRITICAL HIGH (ref 3.8–10.5)
WBC # FLD AUTO: 42.4 K/UL — CRITICAL HIGH (ref 3.8–10.5)
WBC # FLD AUTO: 42.59 K/UL — CRITICAL HIGH (ref 3.8–10.5)
WBC # FLD AUTO: 44.17 K/UL — CRITICAL HIGH (ref 3.8–10.5)
WBC # FLD AUTO: 45.18 K/UL — CRITICAL HIGH (ref 3.8–10.5)
WBC # FLD AUTO: 45.24 K/UL — CRITICAL HIGH (ref 3.8–10.5)
WBC # FLD AUTO: 46.62 K/UL — CRITICAL HIGH (ref 3.8–10.5)
WBC # FLD AUTO: 47.65 K/UL — CRITICAL HIGH (ref 3.8–10.5)
WBC # FLD AUTO: 48.45 K/UL — CRITICAL HIGH (ref 3.8–10.5)
WBC # FLD AUTO: 49 K/UL — CRITICAL HIGH (ref 3.8–10.5)
WBC # FLD AUTO: 49.53 K/UL — CRITICAL HIGH (ref 3.8–10.5)
WBC # FLD AUTO: 49.81 K/UL — CRITICAL HIGH (ref 3.8–10.5)
WBC # FLD AUTO: 53.7 K/UL — CRITICAL HIGH (ref 3.8–10.5)
WBC # FLD AUTO: 54.66 K/UL — CRITICAL HIGH (ref 3.8–10.5)
WBC # FLD AUTO: 54.99 K/UL — CRITICAL HIGH (ref 3.8–10.5)
WBC # FLD AUTO: 55.63 K/UL — CRITICAL HIGH (ref 3.8–10.5)
WBC # FLD AUTO: 57.56 K/UL — CRITICAL HIGH (ref 3.8–10.5)
WBC # FLD AUTO: 60.71 K/UL — CRITICAL HIGH (ref 3.8–10.5)
WBC # FLD AUTO: 61.18 K/UL — CRITICAL HIGH (ref 3.8–10.5)
WBC # FLD AUTO: 62.29 K/UL — CRITICAL HIGH (ref 3.8–10.5)
WBC # FLD AUTO: 64.59 K/UL — CRITICAL HIGH (ref 3.8–10.5)
WBC # FLD AUTO: 66.95 K/UL — CRITICAL HIGH (ref 3.8–10.5)
WBC # FLD AUTO: 70.94 K/UL — CRITICAL HIGH (ref 3.8–10.5)
WBC # FLD AUTO: 73.31 K/UL — CRITICAL HIGH (ref 3.8–10.5)
WBC # FLD AUTO: 73.44 K/UL — CRITICAL HIGH (ref 3.8–10.5)
WBC # FLD AUTO: 74.2 K/UL — CRITICAL HIGH (ref 3.8–10.5)
WBC UR QL: 114 /HPF — HIGH (ref 0–5)
WBC UR QL: 2 /HPF — SIGNIFICANT CHANGE UP (ref 0–5)
WBC UR QL: 87 /HPF — HIGH (ref 0–5)

## 2023-01-01 PROCEDURE — 99232 SBSQ HOSP IP/OBS MODERATE 35: CPT | Mod: GC

## 2023-01-01 PROCEDURE — 86357 NK CELLS TOTAL COUNT: CPT

## 2023-01-01 PROCEDURE — 93306 TTE W/DOPPLER COMPLETE: CPT | Mod: 26

## 2023-01-01 PROCEDURE — 85097 BONE MARROW INTERPRETATION: CPT

## 2023-01-01 PROCEDURE — 42972 CONTROL NOSE/THROAT BLEEDING: CPT | Mod: AS

## 2023-01-01 PROCEDURE — 86355 B CELLS TOTAL COUNT: CPT

## 2023-01-01 PROCEDURE — 74018 RADEX ABDOMEN 1 VIEW: CPT

## 2023-01-01 PROCEDURE — 88280 CHROMOSOME KARYOTYPE STUDY: CPT

## 2023-01-01 PROCEDURE — 71045 X-RAY EXAM CHEST 1 VIEW: CPT | Mod: 26

## 2023-01-01 PROCEDURE — 82306 VITAMIN D 25 HYDROXY: CPT

## 2023-01-01 PROCEDURE — C8929: CPT

## 2023-01-01 PROCEDURE — 88185 FLOWCYTOMETRY/TC ADD-ON: CPT

## 2023-01-01 PROCEDURE — 86480 TB TEST CELL IMMUN MEASURE: CPT

## 2023-01-01 PROCEDURE — 87205 SMEAR GRAM STAIN: CPT

## 2023-01-01 PROCEDURE — 71275 CT ANGIOGRAPHY CHEST: CPT | Mod: MA

## 2023-01-01 PROCEDURE — 93970 EXTREMITY STUDY: CPT

## 2023-01-01 PROCEDURE — 85025 COMPLETE CBC W/AUTO DIFF WBC: CPT

## 2023-01-01 PROCEDURE — 81206 BCR/ABL1 GENE MAJOR BP: CPT

## 2023-01-01 PROCEDURE — 86704 HEP B CORE ANTIBODY TOTAL: CPT

## 2023-01-01 PROCEDURE — 88342 IMHCHEM/IMCYTCHM 1ST ANTB: CPT

## 2023-01-01 PROCEDURE — 74177 CT ABD & PELVIS W/CONTRAST: CPT | Mod: 26,MA

## 2023-01-01 PROCEDURE — 84156 ASSAY OF PROTEIN URINE: CPT

## 2023-01-01 PROCEDURE — 81001 URINALYSIS AUTO W/SCOPE: CPT

## 2023-01-01 PROCEDURE — 99233 SBSQ HOSP IP/OBS HIGH 50: CPT

## 2023-01-01 PROCEDURE — 81245 FLT3 GENE: CPT

## 2023-01-01 PROCEDURE — 82248 BILIRUBIN DIRECT: CPT

## 2023-01-01 PROCEDURE — 82962 GLUCOSE BLOOD TEST: CPT

## 2023-01-01 PROCEDURE — 88271 CYTOGENETICS DNA PROBE: CPT

## 2023-01-01 PROCEDURE — 82728 ASSAY OF FERRITIN: CPT

## 2023-01-01 PROCEDURE — 99233 SBSQ HOSP IP/OBS HIGH 50: CPT | Mod: GC

## 2023-01-01 PROCEDURE — 88264 CHROMOSOME ANALYSIS 20-25: CPT

## 2023-01-01 PROCEDURE — 99223 1ST HOSP IP/OBS HIGH 75: CPT | Mod: GC

## 2023-01-01 PROCEDURE — 74018 RADEX ABDOMEN 1 VIEW: CPT | Mod: 26

## 2023-01-01 PROCEDURE — 97116 GAIT TRAINING THERAPY: CPT

## 2023-01-01 PROCEDURE — 97161 PT EVAL LOW COMPLEX 20 MIN: CPT

## 2023-01-01 PROCEDURE — 80076 HEPATIC FUNCTION PANEL: CPT

## 2023-01-01 PROCEDURE — 85730 THROMBOPLASTIN TIME PARTIAL: CPT

## 2023-01-01 PROCEDURE — 80074 ACUTE HEPATITIS PANEL: CPT

## 2023-01-01 PROCEDURE — 93321 DOPPLER ECHO F-UP/LMTD STD: CPT

## 2023-01-01 PROCEDURE — 82272 OCCULT BLD FECES 1-3 TESTS: CPT

## 2023-01-01 PROCEDURE — 87150 DNA/RNA AMPLIFIED PROBE: CPT

## 2023-01-01 PROCEDURE — 0225U NFCT DS DNA&RNA 21 SARSCOV2: CPT

## 2023-01-01 PROCEDURE — 97110 THERAPEUTIC EXERCISES: CPT

## 2023-01-01 PROCEDURE — 88305 TISSUE EXAM BY PATHOLOGIST: CPT

## 2023-01-01 PROCEDURE — 88365 INSITU HYBRIDIZATION (FISH): CPT | Mod: 26

## 2023-01-01 PROCEDURE — 71250 CT THORAX DX C-: CPT | Mod: 26

## 2023-01-01 PROCEDURE — 84300 ASSAY OF URINE SODIUM: CPT

## 2023-01-01 PROCEDURE — 81261 IGH GENE REARRANGE AMP METH: CPT

## 2023-01-01 PROCEDURE — 85018 HEMOGLOBIN: CPT

## 2023-01-01 PROCEDURE — 85045 AUTOMATED RETICULOCYTE COUNT: CPT

## 2023-01-01 PROCEDURE — 36430 TRANSFUSION BLD/BLD COMPNT: CPT

## 2023-01-01 PROCEDURE — 87449 NOS EACH ORGANISM AG IA: CPT

## 2023-01-01 PROCEDURE — 83540 ASSAY OF IRON: CPT

## 2023-01-01 PROCEDURE — 88239 TISSUE CULTURE TUMOR: CPT

## 2023-01-01 PROCEDURE — 87086 URINE CULTURE/COLONY COUNT: CPT

## 2023-01-01 PROCEDURE — 83735 ASSAY OF MAGNESIUM: CPT

## 2023-01-01 PROCEDURE — 82607 VITAMIN B-12: CPT

## 2023-01-01 PROCEDURE — 70450 CT HEAD/BRAIN W/O DYE: CPT | Mod: 26

## 2023-01-01 PROCEDURE — 85060 BLOOD SMEAR INTERPRETATION: CPT

## 2023-01-01 PROCEDURE — 86901 BLOOD TYPING SEROLOGIC RH(D): CPT

## 2023-01-01 PROCEDURE — 99285 EMERGENCY DEPT VISIT HI MDM: CPT | Mod: 25

## 2023-01-01 PROCEDURE — 83516 IMMUNOASSAY NONANTIBODY: CPT

## 2023-01-01 PROCEDURE — 86360 T CELL ABSOLUTE COUNT/RATIO: CPT

## 2023-01-01 PROCEDURE — 81246 FLT3 GENE ANALYSIS: CPT

## 2023-01-01 PROCEDURE — 71275 CT ANGIOGRAPHY CHEST: CPT | Mod: 26,MA

## 2023-01-01 PROCEDURE — 88313 SPECIAL STAINS GROUP 2: CPT

## 2023-01-01 PROCEDURE — 82784 ASSAY IGA/IGD/IGG/IGM EACH: CPT

## 2023-01-01 PROCEDURE — 81264 IGK REARRANGEABN CLONAL POP: CPT

## 2023-01-01 PROCEDURE — 83935 ASSAY OF URINE OSMOLALITY: CPT

## 2023-01-01 PROCEDURE — 88313 SPECIAL STAINS GROUP 2: CPT | Mod: 26

## 2023-01-01 PROCEDURE — 99232 SBSQ HOSP IP/OBS MODERATE 35: CPT

## 2023-01-01 PROCEDURE — 82570 ASSAY OF URINE CREATININE: CPT

## 2023-01-01 PROCEDURE — 93005 ELECTROCARDIOGRAM TRACING: CPT

## 2023-01-01 PROCEDURE — 88187 FLOWCYTOMETRY/READ 2-8: CPT

## 2023-01-01 PROCEDURE — 93308 TTE F-UP OR LMTD: CPT | Mod: 26

## 2023-01-01 PROCEDURE — 88189 FLOWCYTOMETRY/READ 16 & >: CPT

## 2023-01-01 PROCEDURE — 87529 HSV DNA AMP PROBE: CPT

## 2023-01-01 PROCEDURE — 81270 JAK2 GENE: CPT

## 2023-01-01 PROCEDURE — 84295 ASSAY OF SERUM SODIUM: CPT

## 2023-01-01 PROCEDURE — G0452: CPT | Mod: 26

## 2023-01-01 PROCEDURE — 93010 ELECTROCARDIOGRAM REPORT: CPT

## 2023-01-01 PROCEDURE — 94640 AIRWAY INHALATION TREATMENT: CPT

## 2023-01-01 PROCEDURE — 85014 HEMATOCRIT: CPT

## 2023-01-01 PROCEDURE — 83615 LACTATE (LD) (LDH) ENZYME: CPT

## 2023-01-01 PROCEDURE — 93321 DOPPLER ECHO F-UP/LMTD STD: CPT | Mod: 26

## 2023-01-01 PROCEDURE — P9100: CPT

## 2023-01-01 PROCEDURE — 85610 PROTHROMBIN TIME: CPT

## 2023-01-01 PROCEDURE — 81340 TRB@ GENE REARRANGE AMPLIFY: CPT

## 2023-01-01 PROCEDURE — 88365 INSITU HYBRIDIZATION (FISH): CPT

## 2023-01-01 PROCEDURE — 80048 BASIC METABOLIC PNL TOTAL CA: CPT

## 2023-01-01 PROCEDURE — 84484 ASSAY OF TROPONIN QUANT: CPT

## 2023-01-01 PROCEDURE — 82746 ASSAY OF FOLIC ACID SERUM: CPT

## 2023-01-01 PROCEDURE — 84132 ASSAY OF SERUM POTASSIUM: CPT

## 2023-01-01 PROCEDURE — P9040: CPT

## 2023-01-01 PROCEDURE — 80053 COMPREHEN METABOLIC PANEL: CPT

## 2023-01-01 PROCEDURE — 82947 ASSAY GLUCOSE BLOOD QUANT: CPT

## 2023-01-01 PROCEDURE — 84550 ASSAY OF BLOOD/URIC ACID: CPT

## 2023-01-01 PROCEDURE — 81207 BCR/ABL1 GENE MINOR BP: CPT

## 2023-01-01 PROCEDURE — 82435 ASSAY OF BLOOD CHLORIDE: CPT

## 2023-01-01 PROCEDURE — 86850 RBC ANTIBODY SCREEN: CPT

## 2023-01-01 PROCEDURE — 99285 EMERGENCY DEPT VISIT HI MDM: CPT | Mod: FS

## 2023-01-01 PROCEDURE — 71045 X-RAY EXAM CHEST 1 VIEW: CPT

## 2023-01-01 PROCEDURE — 93970 EXTREMITY STUDY: CPT | Mod: 26

## 2023-01-01 PROCEDURE — 96374 THER/PROPH/DIAG INJ IV PUSH: CPT

## 2023-01-01 PROCEDURE — 81342 TRG GENE REARRANGEMENT ANAL: CPT

## 2023-01-01 PROCEDURE — 36415 COLL VENOUS BLD VENIPUNCTURE: CPT

## 2023-01-01 PROCEDURE — 74177 CT ABD & PELVIS W/CONTRAST: CPT | Mod: MA

## 2023-01-01 PROCEDURE — 99442: CPT | Mod: 95

## 2023-01-01 PROCEDURE — 83880 ASSAY OF NATRIURETIC PEPTIDE: CPT

## 2023-01-01 PROCEDURE — 83010 ASSAY OF HAPTOGLOBIN QUANT: CPT

## 2023-01-01 PROCEDURE — 97530 THERAPEUTIC ACTIVITIES: CPT

## 2023-01-01 PROCEDURE — 82330 ASSAY OF CALCIUM: CPT

## 2023-01-01 PROCEDURE — 80202 ASSAY OF VANCOMYCIN: CPT

## 2023-01-01 PROCEDURE — 99231 SBSQ HOSP IP/OBS SF/LOW 25: CPT | Mod: FS,24

## 2023-01-01 PROCEDURE — P9073: CPT

## 2023-01-01 PROCEDURE — P9037: CPT

## 2023-01-01 PROCEDURE — 84145 PROCALCITONIN (PCT): CPT

## 2023-01-01 PROCEDURE — 84100 ASSAY OF PHOSPHORUS: CPT

## 2023-01-01 PROCEDURE — 85027 COMPLETE CBC AUTOMATED: CPT

## 2023-01-01 PROCEDURE — 86900 BLOOD TYPING SEROLOGIC ABO: CPT

## 2023-01-01 PROCEDURE — 87040 BLOOD CULTURE FOR BACTERIA: CPT

## 2023-01-01 PROCEDURE — 87186 SC STD MICRODIL/AGAR DIL: CPT

## 2023-01-01 PROCEDURE — 86359 T CELLS TOTAL COUNT: CPT

## 2023-01-01 PROCEDURE — 88237 TISSUE CULTURE BONE MARROW: CPT

## 2023-01-01 PROCEDURE — 87798 DETECT AGENT NOS DNA AMP: CPT

## 2023-01-01 PROCEDURE — 36600 WITHDRAWAL OF ARTERIAL BLOOD: CPT

## 2023-01-01 PROCEDURE — 93308 TTE F-UP OR LMTD: CPT

## 2023-01-01 PROCEDURE — 82803 BLOOD GASES ANY COMBINATION: CPT

## 2023-01-01 PROCEDURE — 70450 CT HEAD/BRAIN W/O DYE: CPT

## 2023-01-01 PROCEDURE — 88341 IMHCHEM/IMCYTCHM EA ADD ANTB: CPT

## 2023-01-01 PROCEDURE — 81450 HL NEO GSAP 5-50DNA/DNA&RNA: CPT

## 2023-01-01 PROCEDURE — 83605 ASSAY OF LACTIC ACID: CPT

## 2023-01-01 PROCEDURE — 86923 COMPATIBILITY TEST ELECTRIC: CPT

## 2023-01-01 PROCEDURE — 88291 CYTO/MOLECULAR REPORT: CPT | Mod: 59

## 2023-01-01 PROCEDURE — 88341 IMHCHEM/IMCYTCHM EA ADD ANTB: CPT | Mod: 26

## 2023-01-01 PROCEDURE — 88342 IMHCHEM/IMCYTCHM 1ST ANTB: CPT | Mod: 26,59

## 2023-01-01 PROCEDURE — 82955 ASSAY OF G6PD ENZYME: CPT

## 2023-01-01 PROCEDURE — 71250 CT THORAX DX C-: CPT

## 2023-01-01 PROCEDURE — 83550 IRON BINDING TEST: CPT

## 2023-01-01 PROCEDURE — 88305 TISSUE EXAM BY PATHOLOGIST: CPT | Mod: 26

## 2023-01-01 PROCEDURE — 87077 CULTURE AEROBIC IDENTIFY: CPT

## 2023-01-01 RX ORDER — POTASSIUM CHLORIDE 20 MEQ
40 PACKET (EA) ORAL ONCE
Refills: 0 | Status: DISCONTINUED | OUTPATIENT
Start: 2023-01-01 | End: 2023-01-01

## 2023-01-01 RX ORDER — POTASSIUM CHLORIDE 20 MEQ
40 PACKET (EA) ORAL ONCE
Refills: 0 | Status: COMPLETED | OUTPATIENT
Start: 2023-01-01 | End: 2023-01-01

## 2023-01-01 RX ORDER — LANOLIN ALCOHOL/MO/W.PET/CERES
3 CREAM (GRAM) TOPICAL AT BEDTIME
Refills: 0 | Status: DISCONTINUED | OUTPATIENT
Start: 2023-01-01 | End: 2023-01-01

## 2023-01-01 RX ORDER — METOPROLOL TARTRATE 50 MG
25 TABLET ORAL ONCE
Refills: 0 | Status: COMPLETED | OUTPATIENT
Start: 2023-01-01 | End: 2023-01-01

## 2023-01-01 RX ORDER — THIAMINE MONONITRATE (VIT B1) 100 MG
100 TABLET ORAL THREE TIMES A DAY
Refills: 0 | Status: COMPLETED | OUTPATIENT
Start: 2023-01-01 | End: 2023-01-01

## 2023-01-01 RX ORDER — ACETAMINOPHEN 500 MG
1000 TABLET ORAL ONCE
Refills: 0 | Status: DISCONTINUED | OUTPATIENT
Start: 2023-01-01 | End: 2023-01-01

## 2023-01-01 RX ORDER — LACTOBACILLUS ACIDOPHILUS 100MM CELL
1 CAPSULE ORAL DAILY
Refills: 0 | Status: DISCONTINUED | OUTPATIENT
Start: 2023-01-01 | End: 2023-01-01

## 2023-01-01 RX ORDER — POLYETHYLENE GLYCOL 3350 17 G/17G
17 POWDER, FOR SOLUTION ORAL DAILY
Refills: 0 | Status: DISCONTINUED | OUTPATIENT
Start: 2023-01-01 | End: 2023-01-01

## 2023-01-01 RX ORDER — VANCOMYCIN HCL 1 G
250 VIAL (EA) INTRAVENOUS EVERY 6 HOURS
Refills: 0 | Status: DISCONTINUED | OUTPATIENT
Start: 2023-01-01 | End: 2023-01-01

## 2023-01-01 RX ORDER — HYDROMORPHONE HYDROCHLORIDE 2 MG/ML
0.5 INJECTION INTRAMUSCULAR; INTRAVENOUS; SUBCUTANEOUS ONCE
Refills: 0 | Status: DISCONTINUED | OUTPATIENT
Start: 2023-01-01 | End: 2023-01-01

## 2023-01-01 RX ORDER — CITALOPRAM 10 MG/1
1 TABLET, FILM COATED ORAL
Qty: 0 | Refills: 0 | DISCHARGE

## 2023-01-01 RX ORDER — HYDROMORPHONE HYDROCHLORIDE 2 MG/ML
0.5 INJECTION INTRAMUSCULAR; INTRAVENOUS; SUBCUTANEOUS EVERY 4 HOURS
Refills: 0 | Status: DISCONTINUED | OUTPATIENT
Start: 2023-01-01 | End: 2023-01-01

## 2023-01-01 RX ORDER — FLUTICASONE PROPIONATE 50 MCG
1 SPRAY, SUSPENSION NASAL
Refills: 0 | Status: DISCONTINUED | OUTPATIENT
Start: 2023-01-01 | End: 2023-01-01

## 2023-01-01 RX ORDER — ACETAMINOPHEN 500 MG
650 TABLET ORAL EVERY 6 HOURS
Refills: 0 | Status: DISCONTINUED | OUTPATIENT
Start: 2023-01-01 | End: 2023-01-01

## 2023-01-01 RX ORDER — IPRATROPIUM BROMIDE 0.2 MG/ML
2 SOLUTION, NON-ORAL INHALATION
Qty: 0 | Refills: 0 | DISCHARGE

## 2023-01-01 RX ORDER — CHLORHEXIDINE GLUCONATE 213 G/1000ML
1 SOLUTION TOPICAL DAILY
Refills: 0 | Status: DISCONTINUED | OUTPATIENT
Start: 2023-01-01 | End: 2023-01-01

## 2023-01-01 RX ORDER — MIRTAZAPINE 45 MG/1
1 TABLET, ORALLY DISINTEGRATING ORAL
Qty: 0 | Refills: 0 | DISCHARGE

## 2023-01-01 RX ORDER — SODIUM,POTASSIUM PHOSPHATES 278-250MG
2 POWDER IN PACKET (EA) ORAL ONCE
Refills: 0 | Status: COMPLETED | OUTPATIENT
Start: 2023-01-01 | End: 2023-01-01

## 2023-01-01 RX ORDER — POTASSIUM CHLORIDE 20 MEQ
10 PACKET (EA) ORAL
Refills: 0 | Status: COMPLETED | OUTPATIENT
Start: 2023-01-01 | End: 2023-01-01

## 2023-01-01 RX ORDER — FUROSEMIDE 40 MG
20 TABLET ORAL DAILY
Refills: 0 | Status: DISCONTINUED | OUTPATIENT
Start: 2023-01-01 | End: 2023-01-01

## 2023-01-01 RX ORDER — HYDROMORPHONE HYDROCHLORIDE 2 MG/ML
0.5 INJECTION INTRAMUSCULAR; INTRAVENOUS; SUBCUTANEOUS
Refills: 0 | Status: DISCONTINUED | OUTPATIENT
Start: 2023-01-01 | End: 2023-01-01

## 2023-01-01 RX ORDER — THIAMINE MONONITRATE (VIT B1) 100 MG
500 TABLET ORAL ONCE
Refills: 0 | Status: COMPLETED | OUTPATIENT
Start: 2023-01-01 | End: 2023-01-01

## 2023-01-01 RX ORDER — ACETAMINOPHEN 500 MG
1000 TABLET ORAL ONCE
Refills: 0 | Status: COMPLETED | OUTPATIENT
Start: 2023-01-01 | End: 2023-01-01

## 2023-01-01 RX ORDER — SODIUM BICARBONATE 1 MEQ/ML
0.11 SYRINGE (ML) INTRAVENOUS
Qty: 75 | Refills: 0 | Status: DISCONTINUED | OUTPATIENT
Start: 2023-01-01 | End: 2023-01-01

## 2023-01-01 RX ORDER — NYSTATIN CREAM 100000 [USP'U]/G
1 CREAM TOPICAL
Refills: 0 | Status: DISCONTINUED | OUTPATIENT
Start: 2023-01-01 | End: 2023-01-01

## 2023-01-01 RX ORDER — FUROSEMIDE 40 MG
20 TABLET ORAL ONCE
Refills: 0 | Status: COMPLETED | OUTPATIENT
Start: 2023-01-01 | End: 2023-01-01

## 2023-01-01 RX ORDER — POTASSIUM CHLORIDE 20 MEQ
40 PACKET (EA) ORAL EVERY 4 HOURS
Refills: 0 | Status: DISCONTINUED | OUTPATIENT
Start: 2023-01-01 | End: 2023-01-01

## 2023-01-01 RX ORDER — VANCOMYCIN HCL 1 G
500 VIAL (EA) INTRAVENOUS EVERY 6 HOURS
Refills: 0 | Status: DISCONTINUED | OUTPATIENT
Start: 2023-01-01 | End: 2023-01-01

## 2023-01-01 RX ORDER — VANCOMYCIN HCL 1 G
125 VIAL (EA) INTRAVENOUS EVERY 6 HOURS
Refills: 0 | Status: DISCONTINUED | OUTPATIENT
Start: 2023-01-01 | End: 2023-01-01

## 2023-01-01 RX ORDER — SODIUM CHLORIDE 0.65 %
2 AEROSOL, SPRAY (ML) NASAL EVERY 6 HOURS
Refills: 0 | Status: DISCONTINUED | OUTPATIENT
Start: 2023-01-01 | End: 2023-01-01

## 2023-01-01 RX ORDER — ROBINUL 0.2 MG/ML
0.4 INJECTION INTRAMUSCULAR; INTRAVENOUS EVERY 6 HOURS
Refills: 0 | Status: DISCONTINUED | OUTPATIENT
Start: 2023-01-01 | End: 2023-01-01

## 2023-01-01 RX ORDER — EZETIMIBE 10 MG/1
1 TABLET ORAL
Qty: 0 | Refills: 0 | DISCHARGE

## 2023-01-01 RX ORDER — HYDROMORPHONE HYDROCHLORIDE 2 MG/ML
0.25 INJECTION INTRAMUSCULAR; INTRAVENOUS; SUBCUTANEOUS
Refills: 0 | Status: DISCONTINUED | OUTPATIENT
Start: 2023-01-01 | End: 2023-01-01

## 2023-01-01 RX ORDER — CHOLECALCIFEROL (VITAMIN D3) 125 MCG
2000 CAPSULE ORAL DAILY
Refills: 0 | Status: DISCONTINUED | OUTPATIENT
Start: 2023-01-01 | End: 2023-01-01

## 2023-01-01 RX ORDER — SODIUM BICARBONATE 1 MEQ/ML
650 SYRINGE (ML) INTRAVENOUS THREE TIMES A DAY
Refills: 0 | Status: DISCONTINUED | OUTPATIENT
Start: 2023-01-01 | End: 2023-01-01

## 2023-01-01 RX ORDER — BENZOCAINE AND MENTHOL 5; 1 G/100ML; G/100ML
1 LIQUID ORAL ONCE
Refills: 0 | Status: COMPLETED | OUTPATIENT
Start: 2023-01-01 | End: 2023-01-01

## 2023-01-01 RX ORDER — GUAIFENESIN/DEXTROMETHORPHAN 600MG-30MG
10 TABLET, EXTENDED RELEASE 12 HR ORAL
Qty: 0 | Refills: 0 | DISCHARGE

## 2023-01-01 RX ORDER — DIPHENHYDRAMINE HYDROCHLORIDE AND LIDOCAINE HYDROCHLORIDE AND ALUMINUM HYDROXIDE AND MAGNESIUM HYDRO
30 KIT
Refills: 0 | Status: DISCONTINUED | OUTPATIENT
Start: 2023-01-01 | End: 2023-01-01

## 2023-01-01 RX ORDER — CITALOPRAM 10 MG/1
10 TABLET, FILM COATED ORAL DAILY
Refills: 0 | Status: DISCONTINUED | OUTPATIENT
Start: 2023-01-01 | End: 2023-01-01

## 2023-01-01 RX ORDER — ZINC OXIDE 200 MG/G
1 OINTMENT TOPICAL
Refills: 0 | Status: DISCONTINUED | OUTPATIENT
Start: 2023-01-01 | End: 2023-01-01

## 2023-01-01 RX ORDER — ACETAMINOPHEN 500 MG
500 TABLET ORAL ONCE
Refills: 0 | Status: COMPLETED | OUTPATIENT
Start: 2023-01-01 | End: 2023-01-01

## 2023-01-01 RX ORDER — BENZOCAINE AND MENTHOL 5; 1 G/100ML; G/100ML
1 LIQUID ORAL EVERY 8 HOURS
Refills: 0 | Status: DISCONTINUED | OUTPATIENT
Start: 2023-01-01 | End: 2023-01-01

## 2023-01-01 RX ORDER — CEFEPIME 1 G/1
2000 INJECTION, POWDER, FOR SOLUTION INTRAMUSCULAR; INTRAVENOUS EVERY 12 HOURS
Refills: 0 | Status: DISCONTINUED | OUTPATIENT
Start: 2023-01-01 | End: 2023-01-01

## 2023-01-01 RX ORDER — VANCOMYCIN HCL 1 G
500 VIAL (EA) INTRAVENOUS ONCE
Refills: 0 | Status: DISCONTINUED | OUTPATIENT
Start: 2023-01-01 | End: 2023-01-01

## 2023-01-01 RX ORDER — LOPERAMIDE HCL 2 MG
1 TABLET ORAL
Qty: 0 | Refills: 0 | DISCHARGE

## 2023-01-01 RX ORDER — SALIVA SUBSTITUTE COMB NO.11 351 MG
15 POWDER IN PACKET (EA) MUCOUS MEMBRANE
Refills: 0 | Status: DISCONTINUED | OUTPATIENT
Start: 2023-01-01 | End: 2023-01-01

## 2023-01-01 RX ORDER — SODIUM BICARBONATE 1 MEQ/ML
0.11 SYRINGE (ML) INTRAVENOUS
Qty: 75 | Refills: 0 | Status: COMPLETED | OUTPATIENT
Start: 2023-01-01 | End: 2023-01-01

## 2023-01-01 RX ORDER — HYDROMORPHONE HYDROCHLORIDE 2 MG/ML
0.2 INJECTION INTRAMUSCULAR; INTRAVENOUS; SUBCUTANEOUS
Refills: 0 | Status: DISCONTINUED | OUTPATIENT
Start: 2023-01-01 | End: 2023-01-01

## 2023-01-01 RX ORDER — THIAMINE MONONITRATE (VIT B1) 100 MG
100 TABLET ORAL THREE TIMES A DAY
Refills: 0 | Status: DISCONTINUED | OUTPATIENT
Start: 2023-01-01 | End: 2023-01-01

## 2023-01-01 RX ORDER — POTASSIUM CHLORIDE 20 MEQ
40 PACKET (EA) ORAL EVERY 4 HOURS
Refills: 0 | Status: COMPLETED | OUTPATIENT
Start: 2023-01-01 | End: 2023-01-01

## 2023-01-01 RX ORDER — METOPROLOL TARTRATE 50 MG
5 TABLET ORAL ONCE
Refills: 0 | Status: COMPLETED | OUTPATIENT
Start: 2023-01-01 | End: 2023-01-01

## 2023-01-01 RX ORDER — VANCOMYCIN HCL 1 G
1000 VIAL (EA) INTRAVENOUS EVERY 24 HOURS
Refills: 0 | Status: COMPLETED | OUTPATIENT
Start: 2023-01-01 | End: 2023-01-01

## 2023-01-01 RX ORDER — OMEPRAZOLE 10 MG/1
1 CAPSULE, DELAYED RELEASE ORAL
Qty: 0 | Refills: 0 | DISCHARGE

## 2023-01-01 RX ORDER — POTASSIUM PHOSPHATE, MONOBASIC POTASSIUM PHOSPHATE, DIBASIC 236; 224 MG/ML; MG/ML
15 INJECTION, SOLUTION INTRAVENOUS ONCE
Refills: 0 | Status: COMPLETED | OUTPATIENT
Start: 2023-01-01 | End: 2023-01-01

## 2023-01-01 RX ORDER — CEFEPIME 1 G/1
2000 INJECTION, POWDER, FOR SOLUTION INTRAMUSCULAR; INTRAVENOUS ONCE
Refills: 0 | Status: COMPLETED | OUTPATIENT
Start: 2023-01-01 | End: 2023-01-01

## 2023-01-01 RX ORDER — VANCOMYCIN HCL 1 G
5 VIAL (EA) INTRAVENOUS
Qty: 0 | Refills: 0 | DISCHARGE
Start: 2023-01-01

## 2023-01-01 RX ORDER — DECITABINE 50 MG/20ML
30 INJECTION, POWDER, LYOPHILIZED, FOR SOLUTION INTRAVENOUS EVERY 24 HOURS
Refills: 0 | Status: COMPLETED | OUTPATIENT
Start: 2023-01-01 | End: 2023-01-01

## 2023-01-01 RX ORDER — VANCOMYCIN HCL 1 G
1000 VIAL (EA) INTRAVENOUS EVERY 24 HOURS
Refills: 0 | Status: DISCONTINUED | OUTPATIENT
Start: 2023-01-01 | End: 2023-01-01

## 2023-01-01 RX ORDER — POTASSIUM CHLORIDE 20 MEQ
20 PACKET (EA) ORAL ONCE
Refills: 0 | Status: COMPLETED | OUTPATIENT
Start: 2023-01-01 | End: 2023-01-01

## 2023-01-01 RX ORDER — HYDROMORPHONE HYDROCHLORIDE 2 MG/ML
1 INJECTION INTRAMUSCULAR; INTRAVENOUS; SUBCUTANEOUS
Refills: 0 | Status: DISCONTINUED | OUTPATIENT
Start: 2023-01-01 | End: 2023-01-01

## 2023-01-01 RX ORDER — MAGNESIUM SULFATE 500 MG/ML
1 VIAL (ML) INJECTION ONCE
Refills: 0 | Status: COMPLETED | OUTPATIENT
Start: 2023-01-01 | End: 2023-01-01

## 2023-01-01 RX ORDER — NITROFURANTOIN MACROCRYSTAL 50 MG
100 CAPSULE ORAL
Refills: 0 | Status: COMPLETED | OUTPATIENT
Start: 2023-01-01 | End: 2023-01-01

## 2023-01-01 RX ORDER — ONDANSETRON 8 MG/1
4 TABLET, FILM COATED ORAL EVERY 8 HOURS
Refills: 0 | Status: DISCONTINUED | OUTPATIENT
Start: 2023-01-01 | End: 2023-01-01

## 2023-01-01 RX ORDER — SODIUM CHLORIDE 9 MG/ML
1 INJECTION INTRAMUSCULAR; INTRAVENOUS; SUBCUTANEOUS THREE TIMES A DAY
Refills: 0 | Status: DISCONTINUED | OUTPATIENT
Start: 2023-01-01 | End: 2023-01-01

## 2023-01-01 RX ORDER — METOPROLOL TARTRATE 50 MG
25 TABLET ORAL
Refills: 0 | Status: DISCONTINUED | OUTPATIENT
Start: 2023-01-01 | End: 2023-01-01

## 2023-01-01 RX ORDER — ACYCLOVIR SODIUM 500 MG
260 VIAL (EA) INTRAVENOUS EVERY 8 HOURS
Refills: 0 | Status: DISCONTINUED | OUTPATIENT
Start: 2023-01-01 | End: 2023-01-01

## 2023-01-01 RX ORDER — BUDESONIDE AND FORMOTEROL FUMARATE DIHYDRATE 160; 4.5 UG/1; UG/1
2 AEROSOL RESPIRATORY (INHALATION)
Refills: 0 | Status: DISCONTINUED | OUTPATIENT
Start: 2023-01-01 | End: 2023-01-01

## 2023-01-01 RX ORDER — MAGNESIUM SULFATE 500 MG/ML
2 VIAL (ML) INJECTION ONCE
Refills: 0 | Status: COMPLETED | OUTPATIENT
Start: 2023-01-01 | End: 2023-01-01

## 2023-01-01 RX ORDER — VANCOMYCIN HCL 1 G
750 VIAL (EA) INTRAVENOUS EVERY 24 HOURS
Refills: 0 | Status: DISCONTINUED | OUTPATIENT
Start: 2023-01-01 | End: 2023-01-01

## 2023-01-01 RX ORDER — HYDROMORPHONE HYDROCHLORIDE 2 MG/ML
0.5 INJECTION INTRAMUSCULAR; INTRAVENOUS; SUBCUTANEOUS EVERY 6 HOURS
Refills: 0 | Status: DISCONTINUED | OUTPATIENT
Start: 2023-01-01 | End: 2023-01-01

## 2023-01-01 RX ORDER — DIPHENHYDRAMINE HYDROCHLORIDE AND LIDOCAINE HYDROCHLORIDE AND ALUMINUM HYDROXIDE AND MAGNESIUM HYDRO
10 KIT
Refills: 0 | Status: DISCONTINUED | OUTPATIENT
Start: 2023-01-01 | End: 2023-01-01

## 2023-01-01 RX ORDER — SENNA PLUS 8.6 MG/1
1 TABLET ORAL DAILY
Refills: 0 | Status: DISCONTINUED | OUTPATIENT
Start: 2023-01-01 | End: 2023-01-01

## 2023-01-01 RX ORDER — IPRATROPIUM BROMIDE 0.2 MG/ML
1 SOLUTION, NON-ORAL INHALATION EVERY 6 HOURS
Refills: 0 | Status: DISCONTINUED | OUTPATIENT
Start: 2023-01-01 | End: 2023-01-01

## 2023-01-01 RX ORDER — HYDROMORPHONE HYDROCHLORIDE 2 MG/ML
1 INJECTION INTRAMUSCULAR; INTRAVENOUS; SUBCUTANEOUS EVERY 6 HOURS
Refills: 0 | Status: DISCONTINUED | OUTPATIENT
Start: 2023-01-01 | End: 2023-01-01

## 2023-01-01 RX ORDER — MEROPENEM 1 G/30ML
1000 INJECTION INTRAVENOUS EVERY 12 HOURS
Refills: 0 | Status: DISCONTINUED | OUTPATIENT
Start: 2023-01-01 | End: 2023-01-01

## 2023-01-01 RX ORDER — INFLUENZA VIRUS VACCINE 15; 15; 15; 15 UG/.5ML; UG/.5ML; UG/.5ML; UG/.5ML
0.7 SUSPENSION INTRAMUSCULAR ONCE
Refills: 0 | Status: DISCONTINUED | OUTPATIENT
Start: 2023-01-01 | End: 2023-01-01

## 2023-01-01 RX ORDER — ONDANSETRON 8 MG/1
8 TABLET, FILM COATED ORAL EVERY 24 HOURS
Refills: 0 | Status: COMPLETED | OUTPATIENT
Start: 2023-01-01 | End: 2023-01-01

## 2023-01-01 RX ORDER — LEVALBUTEROL 1.25 MG/.5ML
2 SOLUTION, CONCENTRATE RESPIRATORY (INHALATION)
Qty: 0 | Refills: 0 | DISCHARGE

## 2023-01-01 RX ORDER — SODIUM,POTASSIUM PHOSPHATES 278-250MG
1 POWDER IN PACKET (EA) ORAL ONCE
Refills: 0 | Status: COMPLETED | OUTPATIENT
Start: 2023-01-01 | End: 2023-01-01

## 2023-01-01 RX ORDER — PANTOPRAZOLE SODIUM 20 MG/1
1 TABLET, DELAYED RELEASE ORAL
Qty: 0 | Refills: 0 | DISCHARGE

## 2023-01-01 RX ORDER — IPRATROPIUM BROMIDE 0.2 MG/ML
500 SOLUTION, NON-ORAL INHALATION EVERY 6 HOURS
Refills: 0 | Status: DISCONTINUED | OUTPATIENT
Start: 2023-01-01 | End: 2023-01-01

## 2023-01-01 RX ORDER — PANTOPRAZOLE SODIUM 20 MG/1
40 TABLET, DELAYED RELEASE ORAL
Refills: 0 | Status: DISCONTINUED | OUTPATIENT
Start: 2023-01-01 | End: 2023-01-01

## 2023-01-01 RX ORDER — MIRTAZAPINE 45 MG/1
7.5 TABLET, ORALLY DISINTEGRATING ORAL DAILY
Refills: 0 | Status: DISCONTINUED | OUTPATIENT
Start: 2023-01-01 | End: 2023-01-01

## 2023-01-01 RX ORDER — ACETAMINOPHEN 500 MG
650 TABLET ORAL ONCE
Refills: 0 | Status: COMPLETED | OUTPATIENT
Start: 2023-01-01 | End: 2023-01-01

## 2023-01-01 RX ORDER — FOLIC ACID 0.8 MG
1 TABLET ORAL DAILY
Refills: 0 | Status: DISCONTINUED | OUTPATIENT
Start: 2023-01-01 | End: 2023-01-01

## 2023-01-01 RX ADMIN — Medication 1 TABLET(S): at 11:35

## 2023-01-01 RX ADMIN — PANTOPRAZOLE SODIUM 40 MILLIGRAM(S): 20 TABLET, DELAYED RELEASE ORAL at 05:18

## 2023-01-01 RX ADMIN — Medication 2 PACKET(S): at 18:10

## 2023-01-01 RX ADMIN — BUDESONIDE AND FORMOTEROL FUMARATE DIHYDRATE 2 PUFF(S): 160; 4.5 AEROSOL RESPIRATORY (INHALATION) at 17:16

## 2023-01-01 RX ADMIN — Medication 400 MILLIGRAM(S): at 01:00

## 2023-01-01 RX ADMIN — Medication 1 SPRAY(S): at 17:54

## 2023-01-01 RX ADMIN — DIPHENHYDRAMINE HYDROCHLORIDE AND LIDOCAINE HYDROCHLORIDE AND ALUMINUM HYDROXIDE AND MAGNESIUM HYDRO 10 MILLILITER(S): KIT at 06:51

## 2023-01-01 RX ADMIN — PANTOPRAZOLE SODIUM 40 MILLIGRAM(S): 20 TABLET, DELAYED RELEASE ORAL at 06:21

## 2023-01-01 RX ADMIN — Medication 25 MILLIGRAM(S): at 18:10

## 2023-01-01 RX ADMIN — CITALOPRAM 10 MILLIGRAM(S): 10 TABLET, FILM COATED ORAL at 12:23

## 2023-01-01 RX ADMIN — Medication 2 SPRAY(S): at 11:35

## 2023-01-01 RX ADMIN — Medication 500 MICROGRAM(S): at 17:00

## 2023-01-01 RX ADMIN — Medication 100 MILLIGRAM(S): at 05:37

## 2023-01-01 RX ADMIN — Medication 25 MILLIGRAM(S): at 17:28

## 2023-01-01 RX ADMIN — Medication 650 MILLIGRAM(S): at 05:21

## 2023-01-01 RX ADMIN — PANTOPRAZOLE SODIUM 40 MILLIGRAM(S): 20 TABLET, DELAYED RELEASE ORAL at 06:09

## 2023-01-01 RX ADMIN — BUDESONIDE AND FORMOTEROL FUMARATE DIHYDRATE 2 PUFF(S): 160; 4.5 AEROSOL RESPIRATORY (INHALATION) at 17:56

## 2023-01-01 RX ADMIN — Medication 2000 UNIT(S): at 13:48

## 2023-01-01 RX ADMIN — PANTOPRAZOLE SODIUM 40 MILLIGRAM(S): 20 TABLET, DELAYED RELEASE ORAL at 07:11

## 2023-01-01 RX ADMIN — Medication 1 SPRAY(S): at 17:01

## 2023-01-01 RX ADMIN — Medication 500 MICROGRAM(S): at 17:19

## 2023-01-01 RX ADMIN — Medication 2 SPRAY(S): at 12:09

## 2023-01-01 RX ADMIN — Medication 250 MILLIGRAM(S): at 13:38

## 2023-01-01 RX ADMIN — Medication 1 SPRAY(S): at 18:33

## 2023-01-01 RX ADMIN — Medication 2000 UNIT(S): at 12:59

## 2023-01-01 RX ADMIN — Medication 1 MILLIGRAM(S): at 12:27

## 2023-01-01 RX ADMIN — HYDROMORPHONE HYDROCHLORIDE 0.5 MILLIGRAM(S): 2 INJECTION INTRAMUSCULAR; INTRAVENOUS; SUBCUTANEOUS at 06:27

## 2023-01-01 RX ADMIN — BENZOCAINE AND MENTHOL 1 LOZENGE: 5; 1 LIQUID ORAL at 13:55

## 2023-01-01 RX ADMIN — Medication 15 MILLILITER(S): at 05:36

## 2023-01-01 RX ADMIN — ONDANSETRON 8 MILLIGRAM(S): 8 TABLET, FILM COATED ORAL at 13:25

## 2023-01-01 RX ADMIN — Medication 20 MILLIGRAM(S): at 05:07

## 2023-01-01 RX ADMIN — BUDESONIDE AND FORMOTEROL FUMARATE DIHYDRATE 2 PUFF(S): 160; 4.5 AEROSOL RESPIRATORY (INHALATION) at 18:20

## 2023-01-01 RX ADMIN — Medication 5 MILLIGRAM(S): at 08:14

## 2023-01-01 RX ADMIN — Medication 20 MILLIGRAM(S): at 18:33

## 2023-01-01 RX ADMIN — Medication 250 MILLIGRAM(S): at 17:43

## 2023-01-01 RX ADMIN — Medication 20 MILLIGRAM(S): at 06:31

## 2023-01-01 RX ADMIN — Medication 1 MILLIGRAM(S): at 11:25

## 2023-01-01 RX ADMIN — HYDROMORPHONE HYDROCHLORIDE 0.5 MILLIGRAM(S): 2 INJECTION INTRAMUSCULAR; INTRAVENOUS; SUBCUTANEOUS at 17:33

## 2023-01-01 RX ADMIN — MIRTAZAPINE 7.5 MILLIGRAM(S): 45 TABLET, ORALLY DISINTEGRATING ORAL at 11:08

## 2023-01-01 RX ADMIN — BUDESONIDE AND FORMOTEROL FUMARATE DIHYDRATE 2 PUFF(S): 160; 4.5 AEROSOL RESPIRATORY (INHALATION) at 06:47

## 2023-01-01 RX ADMIN — CHLORHEXIDINE GLUCONATE 1 APPLICATION(S): 213 SOLUTION TOPICAL at 11:15

## 2023-01-01 RX ADMIN — Medication 500 MICROGRAM(S): at 23:43

## 2023-01-01 RX ADMIN — Medication 500 MICROGRAM(S): at 00:04

## 2023-01-01 RX ADMIN — Medication 20 MILLIGRAM(S): at 06:32

## 2023-01-01 RX ADMIN — PANTOPRAZOLE SODIUM 40 MILLIGRAM(S): 20 TABLET, DELAYED RELEASE ORAL at 07:57

## 2023-01-01 RX ADMIN — MEROPENEM 100 MILLIGRAM(S): 1 INJECTION INTRAVENOUS at 06:26

## 2023-01-01 RX ADMIN — Medication 2 SPRAY(S): at 05:20

## 2023-01-01 RX ADMIN — Medication 2 SPRAY(S): at 00:47

## 2023-01-01 RX ADMIN — Medication 15 MILLILITER(S): at 12:37

## 2023-01-01 RX ADMIN — BUDESONIDE AND FORMOTEROL FUMARATE DIHYDRATE 2 PUFF(S): 160; 4.5 AEROSOL RESPIRATORY (INHALATION) at 05:51

## 2023-01-01 RX ADMIN — Medication 2 SPRAY(S): at 17:41

## 2023-01-01 RX ADMIN — Medication 25 MILLIGRAM(S): at 05:27

## 2023-01-01 RX ADMIN — Medication 1 SPRAY(S): at 05:39

## 2023-01-01 RX ADMIN — Medication 500 MILLIGRAM(S): at 06:30

## 2023-01-01 RX ADMIN — Medication 500 MICROGRAM(S): at 05:42

## 2023-01-01 RX ADMIN — MIRTAZAPINE 7.5 MILLIGRAM(S): 45 TABLET, ORALLY DISINTEGRATING ORAL at 14:35

## 2023-01-01 RX ADMIN — Medication 400 MILLIGRAM(S): at 02:34

## 2023-01-01 RX ADMIN — Medication 650 MILLIGRAM(S): at 13:29

## 2023-01-01 RX ADMIN — Medication 1 SPRAY(S): at 17:11

## 2023-01-01 RX ADMIN — Medication 25 MILLIGRAM(S): at 18:34

## 2023-01-01 RX ADMIN — Medication 1 TABLET(S): at 12:26

## 2023-01-01 RX ADMIN — Medication 500 MICROGRAM(S): at 05:40

## 2023-01-01 RX ADMIN — Medication 25 MILLIGRAM(S): at 05:31

## 2023-01-01 RX ADMIN — Medication 600 MILLIGRAM(S): at 05:27

## 2023-01-01 RX ADMIN — BUDESONIDE AND FORMOTEROL FUMARATE DIHYDRATE 2 PUFF(S): 160; 4.5 AEROSOL RESPIRATORY (INHALATION) at 17:49

## 2023-01-01 RX ADMIN — SENNA PLUS 1 TABLET(S): 8.6 TABLET ORAL at 21:32

## 2023-01-01 RX ADMIN — Medication 25 MILLIGRAM(S): at 18:17

## 2023-01-01 RX ADMIN — PANTOPRAZOLE SODIUM 40 MILLIGRAM(S): 20 TABLET, DELAYED RELEASE ORAL at 08:22

## 2023-01-01 RX ADMIN — Medication 1 TABLET(S): at 11:08

## 2023-01-01 RX ADMIN — CITALOPRAM 10 MILLIGRAM(S): 10 TABLET, FILM COATED ORAL at 11:39

## 2023-01-01 RX ADMIN — DIPHENHYDRAMINE HYDROCHLORIDE AND LIDOCAINE HYDROCHLORIDE AND ALUMINUM HYDROXIDE AND MAGNESIUM HYDRO 10 MILLILITER(S): KIT at 05:36

## 2023-01-01 RX ADMIN — PANTOPRAZOLE SODIUM 40 MILLIGRAM(S): 20 TABLET, DELAYED RELEASE ORAL at 05:43

## 2023-01-01 RX ADMIN — Medication 1 MILLIGRAM(S): at 13:47

## 2023-01-01 RX ADMIN — Medication 650 MILLIGRAM(S): at 05:19

## 2023-01-01 RX ADMIN — ONDANSETRON 8 MILLIGRAM(S): 8 TABLET, FILM COATED ORAL at 13:43

## 2023-01-01 RX ADMIN — CITALOPRAM 10 MILLIGRAM(S): 10 TABLET, FILM COATED ORAL at 11:29

## 2023-01-01 RX ADMIN — Medication 25 MILLIGRAM(S): at 17:52

## 2023-01-01 RX ADMIN — Medication 2 SPRAY(S): at 23:05

## 2023-01-01 RX ADMIN — Medication 1 MILLIGRAM(S): at 11:09

## 2023-01-01 RX ADMIN — SENNA PLUS 1 TABLET(S): 8.6 TABLET ORAL at 21:19

## 2023-01-01 RX ADMIN — Medication 2 SPRAY(S): at 12:35

## 2023-01-01 RX ADMIN — Medication 25 MILLIGRAM(S): at 18:13

## 2023-01-01 RX ADMIN — Medication 1 MILLIGRAM(S): at 11:12

## 2023-01-01 RX ADMIN — Medication 500 MICROGRAM(S): at 05:53

## 2023-01-01 RX ADMIN — Medication 20 MILLIGRAM(S): at 05:19

## 2023-01-01 RX ADMIN — Medication 20 MILLIGRAM(S): at 05:39

## 2023-01-01 RX ADMIN — SENNA PLUS 1 TABLET(S): 8.6 TABLET ORAL at 21:39

## 2023-01-01 RX ADMIN — CHLORHEXIDINE GLUCONATE 1 APPLICATION(S): 213 SOLUTION TOPICAL at 13:49

## 2023-01-01 RX ADMIN — Medication 600 MILLIGRAM(S): at 18:34

## 2023-01-01 RX ADMIN — Medication 500 MICROGRAM(S): at 23:31

## 2023-01-01 RX ADMIN — MEROPENEM 100 MILLIGRAM(S): 1 INJECTION INTRAVENOUS at 17:56

## 2023-01-01 RX ADMIN — Medication 1 SPRAY(S): at 06:23

## 2023-01-01 RX ADMIN — Medication 500 MICROGRAM(S): at 16:58

## 2023-01-01 RX ADMIN — Medication 2000 UNIT(S): at 13:29

## 2023-01-01 RX ADMIN — CHLORHEXIDINE GLUCONATE 1 APPLICATION(S): 213 SOLUTION TOPICAL at 12:27

## 2023-01-01 RX ADMIN — Medication 500 MICROGRAM(S): at 11:59

## 2023-01-01 RX ADMIN — PANTOPRAZOLE SODIUM 40 MILLIGRAM(S): 20 TABLET, DELAYED RELEASE ORAL at 05:50

## 2023-01-01 RX ADMIN — Medication 500 MICROGRAM(S): at 18:23

## 2023-01-01 RX ADMIN — Medication 1 TABLET(S): at 14:37

## 2023-01-01 RX ADMIN — Medication 2 SPRAY(S): at 11:54

## 2023-01-01 RX ADMIN — CHLORHEXIDINE GLUCONATE 1 APPLICATION(S): 213 SOLUTION TOPICAL at 11:54

## 2023-01-01 RX ADMIN — Medication 600 MILLIGRAM(S): at 05:20

## 2023-01-01 RX ADMIN — Medication 1 SPRAY(S): at 05:53

## 2023-01-01 RX ADMIN — Medication 75 MEQ/KG/HR: at 15:30

## 2023-01-01 RX ADMIN — Medication 500 MICROGRAM(S): at 06:48

## 2023-01-01 RX ADMIN — Medication 1 MILLIGRAM(S): at 11:57

## 2023-01-01 RX ADMIN — CITALOPRAM 10 MILLIGRAM(S): 10 TABLET, FILM COATED ORAL at 11:12

## 2023-01-01 RX ADMIN — Medication 2 SPRAY(S): at 11:23

## 2023-01-01 RX ADMIN — HYDROMORPHONE HYDROCHLORIDE 0.5 MILLIGRAM(S): 2 INJECTION INTRAMUSCULAR; INTRAVENOUS; SUBCUTANEOUS at 06:42

## 2023-01-01 RX ADMIN — Medication 1 SPRAY(S): at 05:29

## 2023-01-01 RX ADMIN — BUDESONIDE AND FORMOTEROL FUMARATE DIHYDRATE 2 PUFF(S): 160; 4.5 AEROSOL RESPIRATORY (INHALATION) at 05:13

## 2023-01-01 RX ADMIN — Medication 650 MILLIGRAM(S): at 21:38

## 2023-01-01 RX ADMIN — Medication 650 MILLIGRAM(S): at 22:19

## 2023-01-01 RX ADMIN — Medication 1 TABLET(S): at 12:33

## 2023-01-01 RX ADMIN — Medication 1 SPRAY(S): at 05:32

## 2023-01-01 RX ADMIN — Medication 25 MILLIGRAM(S): at 05:33

## 2023-01-01 RX ADMIN — Medication 1 MILLIGRAM(S): at 11:59

## 2023-01-01 RX ADMIN — MIRTAZAPINE 7.5 MILLIGRAM(S): 45 TABLET, ORALLY DISINTEGRATING ORAL at 12:21

## 2023-01-01 RX ADMIN — DECITABINE 30 MILLIGRAM(S): 50 INJECTION, POWDER, LYOPHILIZED, FOR SOLUTION INTRAVENOUS at 15:29

## 2023-01-01 RX ADMIN — PANTOPRAZOLE SODIUM 40 MILLIGRAM(S): 20 TABLET, DELAYED RELEASE ORAL at 05:41

## 2023-01-01 RX ADMIN — Medication 100 MILLIGRAM(S): at 06:31

## 2023-01-01 RX ADMIN — CITALOPRAM 10 MILLIGRAM(S): 10 TABLET, FILM COATED ORAL at 12:26

## 2023-01-01 RX ADMIN — BUDESONIDE AND FORMOTEROL FUMARATE DIHYDRATE 2 PUFF(S): 160; 4.5 AEROSOL RESPIRATORY (INHALATION) at 17:55

## 2023-01-01 RX ADMIN — Medication 1 MILLIGRAM(S): at 12:16

## 2023-01-01 RX ADMIN — BUDESONIDE AND FORMOTEROL FUMARATE DIHYDRATE 2 PUFF(S): 160; 4.5 AEROSOL RESPIRATORY (INHALATION) at 12:37

## 2023-01-01 RX ADMIN — Medication 2 SPRAY(S): at 00:55

## 2023-01-01 RX ADMIN — Medication 2000 UNIT(S): at 11:58

## 2023-01-01 RX ADMIN — Medication 600 MILLIGRAM(S): at 18:22

## 2023-01-01 RX ADMIN — Medication 1 MILLIGRAM(S): at 12:33

## 2023-01-01 RX ADMIN — CITALOPRAM 10 MILLIGRAM(S): 10 TABLET, FILM COATED ORAL at 12:34

## 2023-01-01 RX ADMIN — CHLORHEXIDINE GLUCONATE 1 APPLICATION(S): 213 SOLUTION TOPICAL at 12:40

## 2023-01-01 RX ADMIN — Medication 1 SPRAY(S): at 05:54

## 2023-01-01 RX ADMIN — CITALOPRAM 10 MILLIGRAM(S): 10 TABLET, FILM COATED ORAL at 11:10

## 2023-01-01 RX ADMIN — Medication 100 MILLIGRAM(S): at 05:31

## 2023-01-01 RX ADMIN — Medication 1 PUFF(S): at 14:44

## 2023-01-01 RX ADMIN — Medication 500 MILLIGRAM(S): at 07:06

## 2023-01-01 RX ADMIN — Medication 1 SPRAY(S): at 05:36

## 2023-01-01 RX ADMIN — Medication 20 MILLIGRAM(S): at 05:20

## 2023-01-01 RX ADMIN — BUDESONIDE AND FORMOTEROL FUMARATE DIHYDRATE 2 PUFF(S): 160; 4.5 AEROSOL RESPIRATORY (INHALATION) at 17:54

## 2023-01-01 RX ADMIN — Medication 650 MILLIGRAM(S): at 05:31

## 2023-01-01 RX ADMIN — Medication 40 MILLIEQUIVALENT(S): at 17:12

## 2023-01-01 RX ADMIN — Medication 650 MILLIGRAM(S): at 21:32

## 2023-01-01 RX ADMIN — Medication 1 SPRAY(S): at 05:08

## 2023-01-01 RX ADMIN — SENNA PLUS 1 TABLET(S): 8.6 TABLET ORAL at 22:05

## 2023-01-01 RX ADMIN — Medication 20 MILLIGRAM(S): at 05:33

## 2023-01-01 RX ADMIN — Medication 25 MILLIGRAM(S): at 06:42

## 2023-01-01 RX ADMIN — Medication 650 MILLIGRAM(S): at 21:44

## 2023-01-01 RX ADMIN — Medication 1 SPRAY(S): at 06:36

## 2023-01-01 RX ADMIN — Medication 1 TABLET(S): at 12:22

## 2023-01-01 RX ADMIN — Medication 500 MICROGRAM(S): at 17:12

## 2023-01-01 RX ADMIN — Medication 500 MICROGRAM(S): at 13:47

## 2023-01-01 RX ADMIN — Medication 500 MILLIGRAM(S): at 01:22

## 2023-01-01 RX ADMIN — Medication 650 MILLIGRAM(S): at 21:26

## 2023-01-01 RX ADMIN — Medication 650 MILLIGRAM(S): at 06:09

## 2023-01-01 RX ADMIN — Medication 2 SPRAY(S): at 11:12

## 2023-01-01 RX ADMIN — SENNA PLUS 1 TABLET(S): 8.6 TABLET ORAL at 21:27

## 2023-01-01 RX ADMIN — Medication 20 MILLIGRAM(S): at 06:50

## 2023-01-01 RX ADMIN — Medication 650 MILLIGRAM(S): at 21:57

## 2023-01-01 RX ADMIN — Medication 500 MICROGRAM(S): at 18:00

## 2023-01-01 RX ADMIN — Medication 100 MILLIGRAM(S): at 18:05

## 2023-01-01 RX ADMIN — Medication 650 MILLIGRAM(S): at 13:39

## 2023-01-01 RX ADMIN — Medication 2 SPRAY(S): at 23:10

## 2023-01-01 RX ADMIN — Medication 650 MILLIGRAM(S): at 16:47

## 2023-01-01 RX ADMIN — Medication 250 MILLIGRAM(S): at 09:38

## 2023-01-01 RX ADMIN — POLYETHYLENE GLYCOL 3350 17 GRAM(S): 17 POWDER, FOR SOLUTION ORAL at 11:38

## 2023-01-01 RX ADMIN — BUDESONIDE AND FORMOTEROL FUMARATE DIHYDRATE 2 PUFF(S): 160; 4.5 AEROSOL RESPIRATORY (INHALATION) at 05:20

## 2023-01-01 RX ADMIN — Medication 500 MICROGRAM(S): at 00:29

## 2023-01-01 RX ADMIN — Medication 650 MILLIGRAM(S): at 02:19

## 2023-01-01 RX ADMIN — Medication 25 MILLIGRAM(S): at 05:55

## 2023-01-01 RX ADMIN — BUDESONIDE AND FORMOTEROL FUMARATE DIHYDRATE 2 PUFF(S): 160; 4.5 AEROSOL RESPIRATORY (INHALATION) at 17:09

## 2023-01-01 RX ADMIN — Medication 1 SPRAY(S): at 05:20

## 2023-01-01 RX ADMIN — Medication 1 MILLIGRAM(S): at 11:39

## 2023-01-01 RX ADMIN — SENNA PLUS 1 TABLET(S): 8.6 TABLET ORAL at 11:09

## 2023-01-01 RX ADMIN — BUDESONIDE AND FORMOTEROL FUMARATE DIHYDRATE 2 PUFF(S): 160; 4.5 AEROSOL RESPIRATORY (INHALATION) at 05:42

## 2023-01-01 RX ADMIN — Medication 2000 UNIT(S): at 11:11

## 2023-01-01 RX ADMIN — Medication 20 MILLIGRAM(S): at 14:55

## 2023-01-01 RX ADMIN — Medication 650 MILLIGRAM(S): at 22:23

## 2023-01-01 RX ADMIN — BUDESONIDE AND FORMOTEROL FUMARATE DIHYDRATE 2 PUFF(S): 160; 4.5 AEROSOL RESPIRATORY (INHALATION) at 05:33

## 2023-01-01 RX ADMIN — Medication 500 MICROGRAM(S): at 17:35

## 2023-01-01 RX ADMIN — Medication 1000 MILLIGRAM(S): at 03:00

## 2023-01-01 RX ADMIN — Medication 20 MILLIGRAM(S): at 05:55

## 2023-01-01 RX ADMIN — Medication 20 MILLIGRAM(S): at 19:11

## 2023-01-01 RX ADMIN — Medication 0.25 MILLIGRAM(S): at 18:30

## 2023-01-01 RX ADMIN — Medication 1 TABLET(S): at 11:43

## 2023-01-01 RX ADMIN — Medication 25 MILLIGRAM(S): at 17:12

## 2023-01-01 RX ADMIN — Medication 25 MILLIGRAM(S): at 05:30

## 2023-01-01 RX ADMIN — Medication 600 MILLIGRAM(S): at 05:41

## 2023-01-01 RX ADMIN — CITALOPRAM 10 MILLIGRAM(S): 10 TABLET, FILM COATED ORAL at 16:08

## 2023-01-01 RX ADMIN — Medication 500 MICROGRAM(S): at 17:17

## 2023-01-01 RX ADMIN — CITALOPRAM 10 MILLIGRAM(S): 10 TABLET, FILM COATED ORAL at 13:47

## 2023-01-01 RX ADMIN — Medication 2 SPRAY(S): at 11:57

## 2023-01-01 RX ADMIN — Medication 600 MILLIGRAM(S): at 18:17

## 2023-01-01 RX ADMIN — Medication 650 MILLIGRAM(S): at 23:37

## 2023-01-01 RX ADMIN — Medication 63.75 MILLIMOLE(S): at 11:31

## 2023-01-01 RX ADMIN — SODIUM CHLORIDE 1 GRAM(S): 9 INJECTION INTRAMUSCULAR; INTRAVENOUS; SUBCUTANEOUS at 13:38

## 2023-01-01 RX ADMIN — Medication 2 SPRAY(S): at 17:36

## 2023-01-01 RX ADMIN — Medication 500 MICROGRAM(S): at 23:05

## 2023-01-01 RX ADMIN — Medication 650 MILLIGRAM(S): at 15:08

## 2023-01-01 RX ADMIN — PANTOPRAZOLE SODIUM 40 MILLIGRAM(S): 20 TABLET, DELAYED RELEASE ORAL at 07:51

## 2023-01-01 RX ADMIN — Medication 500 MICROGRAM(S): at 12:15

## 2023-01-01 RX ADMIN — Medication 2000 UNIT(S): at 12:20

## 2023-01-01 RX ADMIN — Medication 1 SPRAY(S): at 18:07

## 2023-01-01 RX ADMIN — Medication 500 MICROGRAM(S): at 06:20

## 2023-01-01 RX ADMIN — Medication 2000 UNIT(S): at 11:08

## 2023-01-01 RX ADMIN — BUDESONIDE AND FORMOTEROL FUMARATE DIHYDRATE 2 PUFF(S): 160; 4.5 AEROSOL RESPIRATORY (INHALATION) at 05:29

## 2023-01-01 RX ADMIN — MIRTAZAPINE 7.5 MILLIGRAM(S): 45 TABLET, ORALLY DISINTEGRATING ORAL at 12:26

## 2023-01-01 RX ADMIN — BUDESONIDE AND FORMOTEROL FUMARATE DIHYDRATE 2 PUFF(S): 160; 4.5 AEROSOL RESPIRATORY (INHALATION) at 18:12

## 2023-01-01 RX ADMIN — Medication 500 MICROGRAM(S): at 05:32

## 2023-01-01 RX ADMIN — BUDESONIDE AND FORMOTEROL FUMARATE DIHYDRATE 2 PUFF(S): 160; 4.5 AEROSOL RESPIRATORY (INHALATION) at 17:08

## 2023-01-01 RX ADMIN — Medication 40 MILLIEQUIVALENT(S): at 17:40

## 2023-01-01 RX ADMIN — Medication 2 SPRAY(S): at 17:42

## 2023-01-01 RX ADMIN — HYDROMORPHONE HYDROCHLORIDE 0.5 MILLIGRAM(S): 2 INJECTION INTRAMUSCULAR; INTRAVENOUS; SUBCUTANEOUS at 03:00

## 2023-01-01 RX ADMIN — ONDANSETRON 8 MILLIGRAM(S): 8 TABLET, FILM COATED ORAL at 11:34

## 2023-01-01 RX ADMIN — CHLORHEXIDINE GLUCONATE 1 APPLICATION(S): 213 SOLUTION TOPICAL at 11:10

## 2023-01-01 RX ADMIN — PANTOPRAZOLE SODIUM 40 MILLIGRAM(S): 20 TABLET, DELAYED RELEASE ORAL at 06:40

## 2023-01-01 RX ADMIN — Medication 1 SPRAY(S): at 18:22

## 2023-01-01 RX ADMIN — MIRTAZAPINE 7.5 MILLIGRAM(S): 45 TABLET, ORALLY DISINTEGRATING ORAL at 11:12

## 2023-01-01 RX ADMIN — Medication 2 SPRAY(S): at 01:46

## 2023-01-01 RX ADMIN — Medication 25 MILLIGRAM(S): at 18:21

## 2023-01-01 RX ADMIN — Medication 25 MILLIGRAM(S): at 17:33

## 2023-01-01 RX ADMIN — Medication 15 MILLILITER(S): at 00:57

## 2023-01-01 RX ADMIN — Medication 500 MICROGRAM(S): at 05:15

## 2023-01-01 RX ADMIN — Medication 1 SPRAY(S): at 17:56

## 2023-01-01 RX ADMIN — Medication 1 SPRAY(S): at 06:33

## 2023-01-01 RX ADMIN — Medication 1 PACKET(S): at 08:50

## 2023-01-01 RX ADMIN — Medication 15 MILLILITER(S): at 05:21

## 2023-01-01 RX ADMIN — Medication 2 SPRAY(S): at 23:30

## 2023-01-01 RX ADMIN — Medication 650 MILLIGRAM(S): at 05:55

## 2023-01-01 RX ADMIN — Medication 100 MILLIGRAM(S): at 17:51

## 2023-01-01 RX ADMIN — Medication 1 TABLET(S): at 13:30

## 2023-01-01 RX ADMIN — BUDESONIDE AND FORMOTEROL FUMARATE DIHYDRATE 2 PUFF(S): 160; 4.5 AEROSOL RESPIRATORY (INHALATION) at 05:43

## 2023-01-01 RX ADMIN — BUDESONIDE AND FORMOTEROL FUMARATE DIHYDRATE 2 PUFF(S): 160; 4.5 AEROSOL RESPIRATORY (INHALATION) at 17:19

## 2023-01-01 RX ADMIN — Medication 2 SPRAY(S): at 05:51

## 2023-01-01 RX ADMIN — Medication 650 MILLIGRAM(S): at 05:41

## 2023-01-01 RX ADMIN — Medication 2 SPRAY(S): at 06:31

## 2023-01-01 RX ADMIN — Medication 500 MILLIGRAM(S): at 14:34

## 2023-01-01 RX ADMIN — Medication 15 MILLILITER(S): at 23:10

## 2023-01-01 RX ADMIN — ONDANSETRON 8 MILLIGRAM(S): 8 TABLET, FILM COATED ORAL at 15:22

## 2023-01-01 RX ADMIN — CITALOPRAM 10 MILLIGRAM(S): 10 TABLET, FILM COATED ORAL at 11:33

## 2023-01-01 RX ADMIN — PANTOPRAZOLE SODIUM 40 MILLIGRAM(S): 20 TABLET, DELAYED RELEASE ORAL at 05:24

## 2023-01-01 RX ADMIN — Medication 500 MICROGRAM(S): at 06:47

## 2023-01-01 RX ADMIN — CITALOPRAM 10 MILLIGRAM(S): 10 TABLET, FILM COATED ORAL at 13:30

## 2023-01-01 RX ADMIN — PANTOPRAZOLE SODIUM 40 MILLIGRAM(S): 20 TABLET, DELAYED RELEASE ORAL at 12:08

## 2023-01-01 RX ADMIN — Medication 1 SPRAY(S): at 18:04

## 2023-01-01 RX ADMIN — CITALOPRAM 10 MILLIGRAM(S): 10 TABLET, FILM COATED ORAL at 11:50

## 2023-01-01 RX ADMIN — Medication 1 SPRAY(S): at 06:51

## 2023-01-01 RX ADMIN — Medication 600 MILLIGRAM(S): at 17:54

## 2023-01-01 RX ADMIN — Medication 500 MICROGRAM(S): at 13:38

## 2023-01-01 RX ADMIN — CHLORHEXIDINE GLUCONATE 1 APPLICATION(S): 213 SOLUTION TOPICAL at 11:34

## 2023-01-01 RX ADMIN — Medication 1 SPRAY(S): at 06:29

## 2023-01-01 RX ADMIN — Medication 500 MICROGRAM(S): at 18:22

## 2023-01-01 RX ADMIN — Medication 650 MILLIGRAM(S): at 14:35

## 2023-01-01 RX ADMIN — Medication 2 SPRAY(S): at 05:32

## 2023-01-01 RX ADMIN — Medication 2 SPRAY(S): at 05:40

## 2023-01-01 RX ADMIN — Medication 20 MILLIGRAM(S): at 06:43

## 2023-01-01 RX ADMIN — Medication 1 SPRAY(S): at 18:12

## 2023-01-01 RX ADMIN — Medication 20 MILLIGRAM(S): at 05:50

## 2023-01-01 RX ADMIN — BUDESONIDE AND FORMOTEROL FUMARATE DIHYDRATE 2 PUFF(S): 160; 4.5 AEROSOL RESPIRATORY (INHALATION) at 06:04

## 2023-01-01 RX ADMIN — Medication 2000 UNIT(S): at 11:22

## 2023-01-01 RX ADMIN — Medication 0.25 MILLIGRAM(S): at 17:37

## 2023-01-01 RX ADMIN — Medication 25 MILLIGRAM(S): at 05:39

## 2023-01-01 RX ADMIN — PANTOPRAZOLE SODIUM 40 MILLIGRAM(S): 20 TABLET, DELAYED RELEASE ORAL at 05:31

## 2023-01-01 RX ADMIN — HYDROMORPHONE HYDROCHLORIDE 0.5 MILLIGRAM(S): 2 INJECTION INTRAMUSCULAR; INTRAVENOUS; SUBCUTANEOUS at 18:30

## 2023-01-01 RX ADMIN — CITALOPRAM 10 MILLIGRAM(S): 10 TABLET, FILM COATED ORAL at 11:36

## 2023-01-01 RX ADMIN — Medication 650 MILLIGRAM(S): at 07:06

## 2023-01-01 RX ADMIN — Medication 2 SPRAY(S): at 01:18

## 2023-01-01 RX ADMIN — Medication 20 MILLIGRAM(S): at 05:21

## 2023-01-01 RX ADMIN — Medication 1 MILLIGRAM(S): at 12:59

## 2023-01-01 RX ADMIN — Medication 500 MICROGRAM(S): at 05:21

## 2023-01-01 RX ADMIN — Medication 20 MILLIGRAM(S): at 06:21

## 2023-01-01 RX ADMIN — Medication 100 GRAM(S): at 17:58

## 2023-01-01 RX ADMIN — BUDESONIDE AND FORMOTEROL FUMARATE DIHYDRATE 2 PUFF(S): 160; 4.5 AEROSOL RESPIRATORY (INHALATION) at 06:30

## 2023-01-01 RX ADMIN — BUDESONIDE AND FORMOTEROL FUMARATE DIHYDRATE 2 PUFF(S): 160; 4.5 AEROSOL RESPIRATORY (INHALATION) at 05:35

## 2023-01-01 RX ADMIN — Medication 1 SPRAY(S): at 17:42

## 2023-01-01 RX ADMIN — Medication 500 MICROGRAM(S): at 05:34

## 2023-01-01 RX ADMIN — Medication 1 MILLIGRAM(S): at 11:10

## 2023-01-01 RX ADMIN — Medication 650 MILLIGRAM(S): at 06:28

## 2023-01-01 RX ADMIN — Medication 2 SPRAY(S): at 23:54

## 2023-01-01 RX ADMIN — Medication 500 MICROGRAM(S): at 11:11

## 2023-01-01 RX ADMIN — Medication 650 MILLIGRAM(S): at 21:47

## 2023-01-01 RX ADMIN — Medication 25 MILLIGRAM(S): at 16:59

## 2023-01-01 RX ADMIN — Medication 100 MILLIGRAM(S): at 18:12

## 2023-01-01 RX ADMIN — Medication 600 MILLIGRAM(S): at 05:30

## 2023-01-01 RX ADMIN — Medication 100 MILLIEQUIVALENT(S): at 11:10

## 2023-01-01 RX ADMIN — Medication 25 MILLIGRAM(S): at 05:07

## 2023-01-01 RX ADMIN — Medication 600 MILLIGRAM(S): at 18:12

## 2023-01-01 RX ADMIN — Medication 15 MILLILITER(S): at 17:10

## 2023-01-01 RX ADMIN — HYDROMORPHONE HYDROCHLORIDE 0.25 MILLIGRAM(S): 2 INJECTION INTRAMUSCULAR; INTRAVENOUS; SUBCUTANEOUS at 14:14

## 2023-01-01 RX ADMIN — Medication 600 MILLIGRAM(S): at 17:10

## 2023-01-01 RX ADMIN — Medication 20 MILLIGRAM(S): at 07:06

## 2023-01-01 RX ADMIN — CHLORHEXIDINE GLUCONATE 1 APPLICATION(S): 213 SOLUTION TOPICAL at 12:57

## 2023-01-01 RX ADMIN — BUDESONIDE AND FORMOTEROL FUMARATE DIHYDRATE 2 PUFF(S): 160; 4.5 AEROSOL RESPIRATORY (INHALATION) at 17:11

## 2023-01-01 RX ADMIN — MIRTAZAPINE 7.5 MILLIGRAM(S): 45 TABLET, ORALLY DISINTEGRATING ORAL at 11:30

## 2023-01-01 RX ADMIN — Medication 25 MILLIGRAM(S): at 18:12

## 2023-01-01 RX ADMIN — Medication 600 MILLIGRAM(S): at 05:08

## 2023-01-01 RX ADMIN — Medication 250 MILLIGRAM(S): at 17:53

## 2023-01-01 RX ADMIN — PANTOPRAZOLE SODIUM 40 MILLIGRAM(S): 20 TABLET, DELAYED RELEASE ORAL at 06:30

## 2023-01-01 RX ADMIN — Medication 105.2 MILLIGRAM(S): at 23:21

## 2023-01-01 RX ADMIN — Medication 600 MILLIGRAM(S): at 06:32

## 2023-01-01 RX ADMIN — Medication 500 MICROGRAM(S): at 18:17

## 2023-01-01 RX ADMIN — Medication 25 MILLIGRAM(S): at 06:30

## 2023-01-01 RX ADMIN — Medication 15 MILLILITER(S): at 17:41

## 2023-01-01 RX ADMIN — Medication 1 SPRAY(S): at 06:31

## 2023-01-01 RX ADMIN — BUDESONIDE AND FORMOTEROL FUMARATE DIHYDRATE 2 PUFF(S): 160; 4.5 AEROSOL RESPIRATORY (INHALATION) at 05:41

## 2023-01-01 RX ADMIN — Medication 0.25 MILLIGRAM(S): at 07:40

## 2023-01-01 RX ADMIN — Medication 500 MICROGRAM(S): at 23:34

## 2023-01-01 RX ADMIN — DIPHENHYDRAMINE HYDROCHLORIDE AND LIDOCAINE HYDROCHLORIDE AND ALUMINUM HYDROXIDE AND MAGNESIUM HYDRO 10 MILLILITER(S): KIT at 05:42

## 2023-01-01 RX ADMIN — Medication 650 MILLIGRAM(S): at 06:32

## 2023-01-01 RX ADMIN — DIPHENHYDRAMINE HYDROCHLORIDE AND LIDOCAINE HYDROCHLORIDE AND ALUMINUM HYDROXIDE AND MAGNESIUM HYDRO 10 MILLILITER(S): KIT at 00:57

## 2023-01-01 RX ADMIN — DIPHENHYDRAMINE HYDROCHLORIDE AND LIDOCAINE HYDROCHLORIDE AND ALUMINUM HYDROXIDE AND MAGNESIUM HYDRO 10 MILLILITER(S): KIT at 23:11

## 2023-01-01 RX ADMIN — Medication 2000 UNIT(S): at 12:26

## 2023-01-01 RX ADMIN — Medication 500 MILLIGRAM(S): at 00:20

## 2023-01-01 RX ADMIN — Medication 1 SPRAY(S): at 17:08

## 2023-01-01 RX ADMIN — BENZOCAINE AND MENTHOL 1 LOZENGE: 5; 1 LIQUID ORAL at 23:25

## 2023-01-01 RX ADMIN — HYDROMORPHONE HYDROCHLORIDE 0.5 MILLIGRAM(S): 2 INJECTION INTRAMUSCULAR; INTRAVENOUS; SUBCUTANEOUS at 09:29

## 2023-01-01 RX ADMIN — Medication 600 MILLIGRAM(S): at 05:57

## 2023-01-01 RX ADMIN — Medication 1 PACKET(S): at 18:11

## 2023-01-01 RX ADMIN — Medication 105.2 MILLIGRAM(S): at 13:27

## 2023-01-01 RX ADMIN — PANTOPRAZOLE SODIUM 40 MILLIGRAM(S): 20 TABLET, DELAYED RELEASE ORAL at 05:57

## 2023-01-01 RX ADMIN — Medication 2 SPRAY(S): at 17:38

## 2023-01-01 RX ADMIN — Medication 1 SPRAY(S): at 07:06

## 2023-01-01 RX ADMIN — Medication 1 TABLET(S): at 12:08

## 2023-01-01 RX ADMIN — Medication 25 MILLIGRAM(S): at 05:41

## 2023-01-01 RX ADMIN — BUDESONIDE AND FORMOTEROL FUMARATE DIHYDRATE 2 PUFF(S): 160; 4.5 AEROSOL RESPIRATORY (INHALATION) at 17:01

## 2023-01-01 RX ADMIN — Medication 1 MILLIGRAM(S): at 11:43

## 2023-01-01 RX ADMIN — Medication 650 MILLIGRAM(S): at 05:32

## 2023-01-01 RX ADMIN — Medication 2 SPRAY(S): at 05:42

## 2023-01-01 RX ADMIN — Medication 25 MILLIGRAM(S): at 17:55

## 2023-01-01 RX ADMIN — PANTOPRAZOLE SODIUM 40 MILLIGRAM(S): 20 TABLET, DELAYED RELEASE ORAL at 06:22

## 2023-01-01 RX ADMIN — Medication 500 MICROGRAM(S): at 00:10

## 2023-01-01 RX ADMIN — Medication 650 MILLIGRAM(S): at 13:47

## 2023-01-01 RX ADMIN — MIRTAZAPINE 7.5 MILLIGRAM(S): 45 TABLET, ORALLY DISINTEGRATING ORAL at 12:16

## 2023-01-01 RX ADMIN — Medication 2 SPRAY(S): at 05:39

## 2023-01-01 RX ADMIN — Medication 105.2 MILLIGRAM(S): at 06:36

## 2023-01-01 RX ADMIN — Medication 2000 UNIT(S): at 11:50

## 2023-01-01 RX ADMIN — PANTOPRAZOLE SODIUM 40 MILLIGRAM(S): 20 TABLET, DELAYED RELEASE ORAL at 06:50

## 2023-01-01 RX ADMIN — Medication 500 MICROGRAM(S): at 17:53

## 2023-01-01 RX ADMIN — DIPHENHYDRAMINE HYDROCHLORIDE AND LIDOCAINE HYDROCHLORIDE AND ALUMINUM HYDROXIDE AND MAGNESIUM HYDRO 10 MILLILITER(S): KIT at 11:26

## 2023-01-01 RX ADMIN — Medication 600 MILLIGRAM(S): at 17:19

## 2023-01-01 RX ADMIN — Medication 2 SPRAY(S): at 06:51

## 2023-01-01 RX ADMIN — Medication 105.2 MILLIGRAM(S): at 06:29

## 2023-01-01 RX ADMIN — SENNA PLUS 1 TABLET(S): 8.6 TABLET ORAL at 23:10

## 2023-01-01 RX ADMIN — Medication 2000 UNIT(S): at 11:29

## 2023-01-01 RX ADMIN — Medication 500 MICROGRAM(S): at 11:35

## 2023-01-01 RX ADMIN — Medication 1 SPRAY(S): at 05:16

## 2023-01-01 RX ADMIN — BUDESONIDE AND FORMOTEROL FUMARATE DIHYDRATE 2 PUFF(S): 160; 4.5 AEROSOL RESPIRATORY (INHALATION) at 18:11

## 2023-01-01 RX ADMIN — BUDESONIDE AND FORMOTEROL FUMARATE DIHYDRATE 2 PUFF(S): 160; 4.5 AEROSOL RESPIRATORY (INHALATION) at 06:13

## 2023-01-01 RX ADMIN — BUDESONIDE AND FORMOTEROL FUMARATE DIHYDRATE 2 PUFF(S): 160; 4.5 AEROSOL RESPIRATORY (INHALATION) at 17:53

## 2023-01-01 RX ADMIN — Medication 2 SPRAY(S): at 06:04

## 2023-01-01 RX ADMIN — Medication 650 MILLIGRAM(S): at 18:21

## 2023-01-01 RX ADMIN — Medication 600 MILLIGRAM(S): at 05:39

## 2023-01-01 RX ADMIN — Medication 1 SPRAY(S): at 05:38

## 2023-01-01 RX ADMIN — DIPHENHYDRAMINE HYDROCHLORIDE AND LIDOCAINE HYDROCHLORIDE AND ALUMINUM HYDROXIDE AND MAGNESIUM HYDRO 10 MILLILITER(S): KIT at 12:33

## 2023-01-01 RX ADMIN — HYDROMORPHONE HYDROCHLORIDE 0.5 MILLIGRAM(S): 2 INJECTION INTRAMUSCULAR; INTRAVENOUS; SUBCUTANEOUS at 03:05

## 2023-01-01 RX ADMIN — Medication 250 MILLIGRAM(S): at 09:25

## 2023-01-01 RX ADMIN — Medication 500 MICROGRAM(S): at 23:50

## 2023-01-01 RX ADMIN — Medication 2 SPRAY(S): at 17:28

## 2023-01-01 RX ADMIN — Medication 105.2 MILLIGRAM(S): at 06:28

## 2023-01-01 RX ADMIN — Medication 30 MILLILITER(S): at 22:04

## 2023-01-01 RX ADMIN — Medication 1 TABLET(S): at 11:57

## 2023-01-01 RX ADMIN — HYDROMORPHONE HYDROCHLORIDE 0.5 MILLIGRAM(S): 2 INJECTION INTRAMUSCULAR; INTRAVENOUS; SUBCUTANEOUS at 21:39

## 2023-01-01 RX ADMIN — Medication 2 SPRAY(S): at 17:15

## 2023-01-01 RX ADMIN — BUDESONIDE AND FORMOTEROL FUMARATE DIHYDRATE 2 PUFF(S): 160; 4.5 AEROSOL RESPIRATORY (INHALATION) at 05:39

## 2023-01-01 RX ADMIN — Medication 1 TABLET(S): at 11:21

## 2023-01-01 RX ADMIN — Medication 25 MILLIGRAM(S): at 05:50

## 2023-01-01 RX ADMIN — Medication 600 MILLIGRAM(S): at 05:50

## 2023-01-01 RX ADMIN — Medication 2 SPRAY(S): at 02:13

## 2023-01-01 RX ADMIN — Medication 500 MICROGRAM(S): at 13:30

## 2023-01-01 RX ADMIN — Medication 15 MILLILITER(S): at 01:46

## 2023-01-01 RX ADMIN — BUDESONIDE AND FORMOTEROL FUMARATE DIHYDRATE 2 PUFF(S): 160; 4.5 AEROSOL RESPIRATORY (INHALATION) at 17:24

## 2023-01-01 RX ADMIN — Medication 600 MILLIGRAM(S): at 17:07

## 2023-01-01 RX ADMIN — Medication 2 SPRAY(S): at 11:09

## 2023-01-01 RX ADMIN — HYDROMORPHONE HYDROCHLORIDE 0.5 MILLIGRAM(S): 2 INJECTION INTRAMUSCULAR; INTRAVENOUS; SUBCUTANEOUS at 03:33

## 2023-01-01 RX ADMIN — Medication 1 TABLET(S): at 12:59

## 2023-01-01 RX ADMIN — Medication 500 MICROGRAM(S): at 00:36

## 2023-01-01 RX ADMIN — Medication 600 MILLIGRAM(S): at 05:15

## 2023-01-01 RX ADMIN — BUDESONIDE AND FORMOTEROL FUMARATE DIHYDRATE 2 PUFF(S): 160; 4.5 AEROSOL RESPIRATORY (INHALATION) at 06:31

## 2023-01-01 RX ADMIN — Medication 105.2 MILLIGRAM(S): at 23:25

## 2023-01-01 RX ADMIN — SENNA PLUS 1 TABLET(S): 8.6 TABLET ORAL at 11:49

## 2023-01-01 RX ADMIN — Medication 100 MILLIEQUIVALENT(S): at 12:50

## 2023-01-01 RX ADMIN — SENNA PLUS 1 TABLET(S): 8.6 TABLET ORAL at 11:30

## 2023-01-01 RX ADMIN — PANTOPRAZOLE SODIUM 40 MILLIGRAM(S): 20 TABLET, DELAYED RELEASE ORAL at 06:29

## 2023-01-01 RX ADMIN — CHLORHEXIDINE GLUCONATE 1 APPLICATION(S): 213 SOLUTION TOPICAL at 16:08

## 2023-01-01 RX ADMIN — Medication 100 MILLIGRAM(S): at 23:53

## 2023-01-01 RX ADMIN — Medication 40 MILLIEQUIVALENT(S): at 08:50

## 2023-01-01 RX ADMIN — Medication 600 MILLIGRAM(S): at 17:52

## 2023-01-01 RX ADMIN — DIPHENHYDRAMINE HYDROCHLORIDE AND LIDOCAINE HYDROCHLORIDE AND ALUMINUM HYDROXIDE AND MAGNESIUM HYDRO 10 MILLILITER(S): KIT at 17:23

## 2023-01-01 RX ADMIN — BUDESONIDE AND FORMOTEROL FUMARATE DIHYDRATE 2 PUFF(S): 160; 4.5 AEROSOL RESPIRATORY (INHALATION) at 17:12

## 2023-01-01 RX ADMIN — Medication 650 MILLIGRAM(S): at 05:38

## 2023-01-01 RX ADMIN — Medication 2 SPRAY(S): at 13:31

## 2023-01-01 RX ADMIN — MEROPENEM 100 MILLIGRAM(S): 1 INJECTION INTRAVENOUS at 05:34

## 2023-01-01 RX ADMIN — Medication 2 SPRAY(S): at 00:48

## 2023-01-01 RX ADMIN — Medication 650 MILLIGRAM(S): at 05:33

## 2023-01-01 RX ADMIN — Medication 650 MILLIGRAM(S): at 06:30

## 2023-01-01 RX ADMIN — Medication 40 MILLIEQUIVALENT(S): at 13:52

## 2023-01-01 RX ADMIN — Medication 20 MILLIGRAM(S): at 05:41

## 2023-01-01 RX ADMIN — Medication 250 MILLIGRAM(S): at 09:53

## 2023-01-01 RX ADMIN — Medication 1 MILLIGRAM(S): at 11:34

## 2023-01-01 RX ADMIN — Medication 1 SPRAY(S): at 18:11

## 2023-01-01 RX ADMIN — PANTOPRAZOLE SODIUM 40 MILLIGRAM(S): 20 TABLET, DELAYED RELEASE ORAL at 05:32

## 2023-01-01 RX ADMIN — Medication 650 MILLIGRAM(S): at 01:22

## 2023-01-01 RX ADMIN — BUDESONIDE AND FORMOTEROL FUMARATE DIHYDRATE 2 PUFF(S): 160; 4.5 AEROSOL RESPIRATORY (INHALATION) at 05:54

## 2023-01-01 RX ADMIN — Medication 100 MILLIEQUIVALENT(S): at 13:08

## 2023-01-01 RX ADMIN — Medication 20 MILLIEQUIVALENT(S): at 17:53

## 2023-01-01 RX ADMIN — HYDROMORPHONE HYDROCHLORIDE 0.5 MILLIGRAM(S): 2 INJECTION INTRAMUSCULAR; INTRAVENOUS; SUBCUTANEOUS at 23:35

## 2023-01-01 RX ADMIN — Medication 600 MILLIGRAM(S): at 06:51

## 2023-01-01 RX ADMIN — BUDESONIDE AND FORMOTEROL FUMARATE DIHYDRATE 2 PUFF(S): 160; 4.5 AEROSOL RESPIRATORY (INHALATION) at 18:23

## 2023-01-01 RX ADMIN — HYDROMORPHONE HYDROCHLORIDE 0.5 MILLIGRAM(S): 2 INJECTION INTRAMUSCULAR; INTRAVENOUS; SUBCUTANEOUS at 11:08

## 2023-01-01 RX ADMIN — CHLORHEXIDINE GLUCONATE 1 APPLICATION(S): 213 SOLUTION TOPICAL at 13:36

## 2023-01-01 RX ADMIN — CHLORHEXIDINE GLUCONATE 1 APPLICATION(S): 213 SOLUTION TOPICAL at 12:34

## 2023-01-01 RX ADMIN — Medication 25 MILLIGRAM(S): at 18:00

## 2023-01-01 RX ADMIN — Medication 1 TABLET(S): at 11:22

## 2023-01-01 RX ADMIN — Medication 600 MILLIGRAM(S): at 18:10

## 2023-01-01 RX ADMIN — CITALOPRAM 10 MILLIGRAM(S): 10 TABLET, FILM COATED ORAL at 12:58

## 2023-01-01 RX ADMIN — Medication 20 MILLIGRAM(S): at 05:38

## 2023-01-01 RX ADMIN — Medication 1 SPRAY(S): at 05:43

## 2023-01-01 RX ADMIN — Medication 500 MICROGRAM(S): at 17:49

## 2023-01-01 RX ADMIN — Medication 650 MILLIGRAM(S): at 21:19

## 2023-01-01 RX ADMIN — Medication 650 MILLIGRAM(S): at 12:26

## 2023-01-01 RX ADMIN — Medication 100 MILLIEQUIVALENT(S): at 15:36

## 2023-01-01 RX ADMIN — CHLORHEXIDINE GLUCONATE 1 APPLICATION(S): 213 SOLUTION TOPICAL at 12:02

## 2023-01-01 RX ADMIN — MIRTAZAPINE 7.5 MILLIGRAM(S): 45 TABLET, ORALLY DISINTEGRATING ORAL at 11:49

## 2023-01-01 RX ADMIN — Medication 100 MILLIGRAM(S): at 16:47

## 2023-01-01 RX ADMIN — BUDESONIDE AND FORMOTEROL FUMARATE DIHYDRATE 2 PUFF(S): 160; 4.5 AEROSOL RESPIRATORY (INHALATION) at 18:05

## 2023-01-01 RX ADMIN — Medication 600 MILLIGRAM(S): at 14:35

## 2023-01-01 RX ADMIN — Medication 600 MILLIGRAM(S): at 17:34

## 2023-01-01 RX ADMIN — Medication 125 MILLIGRAM(S): at 12:27

## 2023-01-01 RX ADMIN — Medication 650 MILLIGRAM(S): at 05:00

## 2023-01-01 RX ADMIN — Medication 500 MICROGRAM(S): at 07:08

## 2023-01-01 RX ADMIN — Medication 1 TABLET(S): at 11:10

## 2023-01-01 RX ADMIN — Medication 650 MILLIGRAM(S): at 12:59

## 2023-01-01 RX ADMIN — Medication 1 MILLIGRAM(S): at 12:08

## 2023-01-01 RX ADMIN — Medication 1 MILLIGRAM(S): at 12:26

## 2023-01-01 RX ADMIN — HYDROMORPHONE HYDROCHLORIDE 0.5 MILLIGRAM(S): 2 INJECTION INTRAMUSCULAR; INTRAVENOUS; SUBCUTANEOUS at 12:57

## 2023-01-01 RX ADMIN — MIRTAZAPINE 7.5 MILLIGRAM(S): 45 TABLET, ORALLY DISINTEGRATING ORAL at 13:48

## 2023-01-01 RX ADMIN — MIRTAZAPINE 7.5 MILLIGRAM(S): 45 TABLET, ORALLY DISINTEGRATING ORAL at 12:59

## 2023-01-01 RX ADMIN — Medication 105.2 MILLIGRAM(S): at 21:43

## 2023-01-01 RX ADMIN — CITALOPRAM 10 MILLIGRAM(S): 10 TABLET, FILM COATED ORAL at 11:59

## 2023-01-01 RX ADMIN — Medication 600 MILLIGRAM(S): at 06:27

## 2023-01-01 RX ADMIN — CHLORHEXIDINE GLUCONATE 1 APPLICATION(S): 213 SOLUTION TOPICAL at 11:12

## 2023-01-01 RX ADMIN — Medication 500 MICROGRAM(S): at 17:56

## 2023-01-01 RX ADMIN — Medication 600 MILLIGRAM(S): at 17:53

## 2023-01-01 RX ADMIN — Medication 20 MILLIGRAM(S): at 05:32

## 2023-01-01 RX ADMIN — SENNA PLUS 1 TABLET(S): 8.6 TABLET ORAL at 21:44

## 2023-01-01 RX ADMIN — Medication 105.2 MILLIGRAM(S): at 13:42

## 2023-01-01 RX ADMIN — Medication 25 MILLIGRAM(S): at 17:10

## 2023-01-01 RX ADMIN — MIRTAZAPINE 7.5 MILLIGRAM(S): 45 TABLET, ORALLY DISINTEGRATING ORAL at 11:35

## 2023-01-01 RX ADMIN — Medication 2000 UNIT(S): at 12:32

## 2023-01-01 RX ADMIN — Medication 600 MILLIGRAM(S): at 18:06

## 2023-01-01 RX ADMIN — MIRTAZAPINE 7.5 MILLIGRAM(S): 45 TABLET, ORALLY DISINTEGRATING ORAL at 11:25

## 2023-01-01 RX ADMIN — HYDROMORPHONE HYDROCHLORIDE 0.5 MILLIGRAM(S): 2 INJECTION INTRAMUSCULAR; INTRAVENOUS; SUBCUTANEOUS at 03:30

## 2023-01-01 RX ADMIN — Medication 20 MILLIGRAM(S): at 05:30

## 2023-01-01 RX ADMIN — BUDESONIDE AND FORMOTEROL FUMARATE DIHYDRATE 2 PUFF(S): 160; 4.5 AEROSOL RESPIRATORY (INHALATION) at 07:07

## 2023-01-01 RX ADMIN — BUDESONIDE AND FORMOTEROL FUMARATE DIHYDRATE 2 PUFF(S): 160; 4.5 AEROSOL RESPIRATORY (INHALATION) at 05:40

## 2023-01-01 RX ADMIN — Medication 500 MICROGRAM(S): at 17:28

## 2023-01-01 RX ADMIN — Medication 650 MILLIGRAM(S): at 21:35

## 2023-01-01 RX ADMIN — Medication 100 MILLIEQUIVALENT(S): at 13:21

## 2023-01-01 RX ADMIN — Medication 500 MICROGRAM(S): at 11:24

## 2023-01-01 RX ADMIN — BUDESONIDE AND FORMOTEROL FUMARATE DIHYDRATE 2 PUFF(S): 160; 4.5 AEROSOL RESPIRATORY (INHALATION) at 18:33

## 2023-01-01 RX ADMIN — Medication 500 MICROGRAM(S): at 06:51

## 2023-01-01 RX ADMIN — Medication 500 MICROGRAM(S): at 05:17

## 2023-01-01 RX ADMIN — Medication 600 MILLIGRAM(S): at 06:29

## 2023-01-01 RX ADMIN — Medication 500 MICROGRAM(S): at 05:56

## 2023-01-01 RX ADMIN — Medication 500 MICROGRAM(S): at 18:14

## 2023-01-01 RX ADMIN — Medication 500 MICROGRAM(S): at 18:13

## 2023-01-01 RX ADMIN — Medication 500 MICROGRAM(S): at 11:57

## 2023-01-01 RX ADMIN — Medication 650 MILLIGRAM(S): at 21:39

## 2023-01-01 RX ADMIN — Medication 25 MILLIGRAM(S): at 18:04

## 2023-01-01 RX ADMIN — Medication 25 MILLIGRAM(S): at 06:32

## 2023-01-01 RX ADMIN — MIRTAZAPINE 7.5 MILLIGRAM(S): 45 TABLET, ORALLY DISINTEGRATING ORAL at 12:33

## 2023-01-01 RX ADMIN — Medication 2000 UNIT(S): at 12:08

## 2023-01-01 RX ADMIN — CHLORHEXIDINE GLUCONATE 1 APPLICATION(S): 213 SOLUTION TOPICAL at 11:09

## 2023-01-01 RX ADMIN — Medication 1 SPRAY(S): at 17:24

## 2023-01-01 RX ADMIN — Medication 2 SPRAY(S): at 06:28

## 2023-01-01 RX ADMIN — Medication 500 MICROGRAM(S): at 12:04

## 2023-01-01 RX ADMIN — Medication 1 SPRAY(S): at 18:20

## 2023-01-01 RX ADMIN — Medication 250 MILLIGRAM(S): at 12:07

## 2023-01-01 RX ADMIN — HYDROMORPHONE HYDROCHLORIDE 0.5 MILLIGRAM(S): 2 INJECTION INTRAMUSCULAR; INTRAVENOUS; SUBCUTANEOUS at 12:03

## 2023-01-01 RX ADMIN — Medication 20 MILLIGRAM(S): at 06:39

## 2023-01-01 RX ADMIN — MIRTAZAPINE 7.5 MILLIGRAM(S): 45 TABLET, ORALLY DISINTEGRATING ORAL at 11:43

## 2023-01-01 RX ADMIN — Medication 2 SPRAY(S): at 18:12

## 2023-01-01 RX ADMIN — HYDROMORPHONE HYDROCHLORIDE 0.5 MILLIGRAM(S): 2 INJECTION INTRAMUSCULAR; INTRAVENOUS; SUBCUTANEOUS at 07:52

## 2023-01-01 RX ADMIN — ONDANSETRON 8 MILLIGRAM(S): 8 TABLET, FILM COATED ORAL at 14:04

## 2023-01-01 RX ADMIN — Medication 500 MICROGRAM(S): at 19:57

## 2023-01-01 RX ADMIN — CHLORHEXIDINE GLUCONATE 1 APPLICATION(S): 213 SOLUTION TOPICAL at 12:05

## 2023-01-01 RX ADMIN — Medication 500 MICROGRAM(S): at 05:26

## 2023-01-01 RX ADMIN — Medication 2000 UNIT(S): at 13:37

## 2023-01-01 RX ADMIN — Medication 600 MILLIGRAM(S): at 05:33

## 2023-01-01 RX ADMIN — Medication 1000 MILLIGRAM(S): at 21:00

## 2023-01-01 RX ADMIN — HYDROMORPHONE HYDROCHLORIDE 0.5 MILLIGRAM(S): 2 INJECTION INTRAMUSCULAR; INTRAVENOUS; SUBCUTANEOUS at 08:30

## 2023-01-01 RX ADMIN — Medication 100 MILLIGRAM(S): at 18:10

## 2023-01-01 RX ADMIN — CHLORHEXIDINE GLUCONATE 1 APPLICATION(S): 213 SOLUTION TOPICAL at 11:31

## 2023-01-01 RX ADMIN — HYDROMORPHONE HYDROCHLORIDE 0.5 MILLIGRAM(S): 2 INJECTION INTRAMUSCULAR; INTRAVENOUS; SUBCUTANEOUS at 07:40

## 2023-01-01 RX ADMIN — MIRTAZAPINE 7.5 MILLIGRAM(S): 45 TABLET, ORALLY DISINTEGRATING ORAL at 11:09

## 2023-01-01 RX ADMIN — Medication 650 MILLIGRAM(S): at 23:13

## 2023-01-01 RX ADMIN — CHLORHEXIDINE GLUCONATE 1 APPLICATION(S): 213 SOLUTION TOPICAL at 11:35

## 2023-01-01 RX ADMIN — Medication 500 MICROGRAM(S): at 12:23

## 2023-01-01 RX ADMIN — Medication 650 MILLIGRAM(S): at 22:44

## 2023-01-01 RX ADMIN — Medication 2 SPRAY(S): at 12:37

## 2023-01-01 RX ADMIN — Medication 2000 UNIT(S): at 11:43

## 2023-01-01 RX ADMIN — Medication 20 MILLIGRAM(S): at 06:09

## 2023-01-01 RX ADMIN — BUDESONIDE AND FORMOTEROL FUMARATE DIHYDRATE 2 PUFF(S): 160; 4.5 AEROSOL RESPIRATORY (INHALATION) at 18:07

## 2023-01-01 RX ADMIN — Medication 1 SPRAY(S): at 17:16

## 2023-01-01 RX ADMIN — Medication 40 MILLIEQUIVALENT(S): at 14:09

## 2023-01-01 RX ADMIN — Medication 1 TABLET(S): at 13:48

## 2023-01-01 RX ADMIN — Medication 2 SPRAY(S): at 06:23

## 2023-01-01 RX ADMIN — Medication 500 MICROGRAM(S): at 18:04

## 2023-01-01 RX ADMIN — Medication 105.2 MILLIGRAM(S): at 13:36

## 2023-01-01 RX ADMIN — Medication 500 MICROGRAM(S): at 05:41

## 2023-01-01 RX ADMIN — BUDESONIDE AND FORMOTEROL FUMARATE DIHYDRATE 2 PUFF(S): 160; 4.5 AEROSOL RESPIRATORY (INHALATION) at 05:22

## 2023-01-01 RX ADMIN — Medication 500 MICROGRAM(S): at 17:33

## 2023-01-01 RX ADMIN — Medication 25 MILLIGRAM(S): at 17:09

## 2023-01-01 RX ADMIN — Medication 500 MICROGRAM(S): at 01:20

## 2023-01-01 RX ADMIN — CHLORHEXIDINE GLUCONATE 1 APPLICATION(S): 213 SOLUTION TOPICAL at 15:10

## 2023-01-01 RX ADMIN — Medication 650 MILLIGRAM(S): at 11:50

## 2023-01-01 RX ADMIN — POLYETHYLENE GLYCOL 3350 17 GRAM(S): 17 POWDER, FOR SOLUTION ORAL at 11:10

## 2023-01-01 RX ADMIN — CITALOPRAM 10 MILLIGRAM(S): 10 TABLET, FILM COATED ORAL at 11:08

## 2023-01-01 RX ADMIN — SENNA PLUS 1 TABLET(S): 8.6 TABLET ORAL at 21:57

## 2023-01-01 RX ADMIN — Medication 600 MILLIGRAM(S): at 05:31

## 2023-01-01 RX ADMIN — Medication 600 MILLIGRAM(S): at 18:05

## 2023-01-01 RX ADMIN — Medication 25 MILLIGRAM(S): at 07:06

## 2023-01-01 RX ADMIN — Medication 1 SPRAY(S): at 17:09

## 2023-01-01 RX ADMIN — Medication 25 MILLIGRAM(S): at 17:24

## 2023-01-01 RX ADMIN — BUDESONIDE AND FORMOTEROL FUMARATE DIHYDRATE 2 PUFF(S): 160; 4.5 AEROSOL RESPIRATORY (INHALATION) at 17:29

## 2023-01-01 RX ADMIN — BUDESONIDE AND FORMOTEROL FUMARATE DIHYDRATE 2 PUFF(S): 160; 4.5 AEROSOL RESPIRATORY (INHALATION) at 17:43

## 2023-01-01 RX ADMIN — Medication 25 MILLIGRAM(S): at 14:26

## 2023-01-01 RX ADMIN — Medication 1 TABLET(S): at 11:59

## 2023-01-01 RX ADMIN — Medication 25 MILLIGRAM(S): at 17:14

## 2023-01-01 RX ADMIN — BUDESONIDE AND FORMOTEROL FUMARATE DIHYDRATE 2 PUFF(S): 160; 4.5 AEROSOL RESPIRATORY (INHALATION) at 16:59

## 2023-01-01 RX ADMIN — Medication 2000 UNIT(S): at 12:33

## 2023-01-01 RX ADMIN — Medication 600 MILLIGRAM(S): at 17:09

## 2023-01-01 RX ADMIN — Medication 600 MILLIGRAM(S): at 05:35

## 2023-01-01 RX ADMIN — Medication 1 SPRAY(S): at 17:19

## 2023-01-01 RX ADMIN — Medication 20 MILLIGRAM(S): at 06:30

## 2023-01-01 RX ADMIN — Medication 500 MICROGRAM(S): at 11:09

## 2023-01-01 RX ADMIN — Medication 500 MICROGRAM(S): at 17:55

## 2023-01-01 RX ADMIN — Medication 500 MICROGRAM(S): at 05:39

## 2023-01-01 RX ADMIN — Medication 1 SPRAY(S): at 05:33

## 2023-01-01 RX ADMIN — HYDROMORPHONE HYDROCHLORIDE 0.5 MILLIGRAM(S): 2 INJECTION INTRAMUSCULAR; INTRAVENOUS; SUBCUTANEOUS at 14:53

## 2023-01-01 RX ADMIN — HYDROMORPHONE HYDROCHLORIDE 0.5 MILLIGRAM(S): 2 INJECTION INTRAMUSCULAR; INTRAVENOUS; SUBCUTANEOUS at 18:45

## 2023-01-01 RX ADMIN — Medication 500 MICROGRAM(S): at 11:42

## 2023-01-01 RX ADMIN — CHLORHEXIDINE GLUCONATE 1 APPLICATION(S): 213 SOLUTION TOPICAL at 13:42

## 2023-01-01 RX ADMIN — HYDROMORPHONE HYDROCHLORIDE 1 MILLIGRAM(S): 2 INJECTION INTRAMUSCULAR; INTRAVENOUS; SUBCUTANEOUS at 17:38

## 2023-01-01 RX ADMIN — Medication 500 MICROGRAM(S): at 05:38

## 2023-01-01 RX ADMIN — Medication 500 MICROGRAM(S): at 05:23

## 2023-01-01 RX ADMIN — Medication 2 SPRAY(S): at 17:53

## 2023-01-01 RX ADMIN — Medication 500 MICROGRAM(S): at 17:10

## 2023-01-01 RX ADMIN — Medication 2 SPRAY(S): at 05:13

## 2023-01-01 RX ADMIN — Medication 2 SPRAY(S): at 18:04

## 2023-01-01 RX ADMIN — Medication 1 TABLET(S): at 11:12

## 2023-01-01 RX ADMIN — Medication 1 SPRAY(S): at 05:42

## 2023-01-01 RX ADMIN — Medication 600 MILLIGRAM(S): at 05:42

## 2023-01-01 RX ADMIN — Medication 100 MILLIGRAM(S): at 21:27

## 2023-01-01 RX ADMIN — Medication 500 MICROGRAM(S): at 00:12

## 2023-01-01 RX ADMIN — BUDESONIDE AND FORMOTEROL FUMARATE DIHYDRATE 2 PUFF(S): 160; 4.5 AEROSOL RESPIRATORY (INHALATION) at 06:23

## 2023-01-01 RX ADMIN — MEROPENEM 100 MILLIGRAM(S): 1 INJECTION INTRAVENOUS at 18:13

## 2023-01-01 RX ADMIN — Medication 1 MILLIGRAM(S): at 12:22

## 2023-01-01 RX ADMIN — Medication 650 MILLIGRAM(S): at 21:27

## 2023-01-01 RX ADMIN — Medication 2 SPRAY(S): at 17:11

## 2023-01-01 RX ADMIN — Medication 650 MILLIGRAM(S): at 06:50

## 2023-01-01 RX ADMIN — MEROPENEM 100 MILLIGRAM(S): 1 INJECTION INTRAVENOUS at 17:00

## 2023-01-01 RX ADMIN — Medication 25 MILLIGRAM(S): at 17:07

## 2023-01-01 RX ADMIN — CITALOPRAM 10 MILLIGRAM(S): 10 TABLET, FILM COATED ORAL at 12:59

## 2023-01-01 RX ADMIN — Medication 25 MILLIGRAM(S): at 05:34

## 2023-01-01 RX ADMIN — Medication 500 MICROGRAM(S): at 01:46

## 2023-01-01 RX ADMIN — Medication 500 MICROGRAM(S): at 12:33

## 2023-01-01 RX ADMIN — Medication 20 MILLIGRAM(S): at 05:16

## 2023-01-01 RX ADMIN — DECITABINE 30 MILLIGRAM(S): 50 INJECTION, POWDER, LYOPHILIZED, FOR SOLUTION INTRAVENOUS at 14:25

## 2023-01-01 RX ADMIN — SENNA PLUS 1 TABLET(S): 8.6 TABLET ORAL at 23:37

## 2023-01-01 RX ADMIN — Medication 1 SPRAY(S): at 06:04

## 2023-01-01 RX ADMIN — Medication 500 MICROGRAM(S): at 23:13

## 2023-01-01 RX ADMIN — Medication 1 SPRAY(S): at 17:28

## 2023-01-01 RX ADMIN — CEFEPIME 100 MILLIGRAM(S): 1 INJECTION, POWDER, FOR SOLUTION INTRAMUSCULAR; INTRAVENOUS at 18:16

## 2023-01-01 RX ADMIN — Medication 2000 UNIT(S): at 11:35

## 2023-01-01 RX ADMIN — Medication 500 MICROGRAM(S): at 05:30

## 2023-01-01 RX ADMIN — CHLORHEXIDINE GLUCONATE 1 APPLICATION(S): 213 SOLUTION TOPICAL at 11:11

## 2023-01-01 RX ADMIN — Medication 600 MILLIGRAM(S): at 06:09

## 2023-01-01 RX ADMIN — Medication 650 MILLIGRAM(S): at 06:29

## 2023-01-01 RX ADMIN — Medication 2 SPRAY(S): at 13:38

## 2023-01-01 RX ADMIN — Medication 2 SPRAY(S): at 06:37

## 2023-01-01 RX ADMIN — Medication 500 MICROGRAM(S): at 06:30

## 2023-01-01 RX ADMIN — Medication 105.2 MILLIGRAM(S): at 15:10

## 2023-01-01 RX ADMIN — Medication 500 MICROGRAM(S): at 00:20

## 2023-01-01 RX ADMIN — Medication 1 SPRAY(S): at 17:36

## 2023-01-01 RX ADMIN — CHLORHEXIDINE GLUCONATE 1 APPLICATION(S): 213 SOLUTION TOPICAL at 18:20

## 2023-01-01 RX ADMIN — PANTOPRAZOLE SODIUM 40 MILLIGRAM(S): 20 TABLET, DELAYED RELEASE ORAL at 06:52

## 2023-01-01 RX ADMIN — BUDESONIDE AND FORMOTEROL FUMARATE DIHYDRATE 2 PUFF(S): 160; 4.5 AEROSOL RESPIRATORY (INHALATION) at 05:32

## 2023-01-01 RX ADMIN — Medication 2 SPRAY(S): at 12:56

## 2023-01-01 RX ADMIN — Medication 100 MILLIGRAM(S): at 05:39

## 2023-01-01 RX ADMIN — Medication 25 MILLIGRAM(S): at 05:20

## 2023-01-01 RX ADMIN — Medication 1 TABLET(S): at 11:29

## 2023-01-01 RX ADMIN — SENNA PLUS 1 TABLET(S): 8.6 TABLET ORAL at 23:53

## 2023-01-01 RX ADMIN — CITALOPRAM 10 MILLIGRAM(S): 10 TABLET, FILM COATED ORAL at 12:16

## 2023-01-01 RX ADMIN — Medication 600 MILLIGRAM(S): at 17:14

## 2023-01-01 RX ADMIN — Medication 500 MICROGRAM(S): at 18:10

## 2023-01-01 RX ADMIN — Medication 20 MILLIGRAM(S): at 06:28

## 2023-01-01 RX ADMIN — CHLORHEXIDINE GLUCONATE 1 APPLICATION(S): 213 SOLUTION TOPICAL at 11:39

## 2023-01-01 RX ADMIN — Medication 650 MILLIGRAM(S): at 14:54

## 2023-01-01 RX ADMIN — Medication 650 MILLIGRAM(S): at 05:37

## 2023-01-01 RX ADMIN — Medication 100 MILLIEQUIVALENT(S): at 13:43

## 2023-01-01 RX ADMIN — Medication 600 MILLIGRAM(S): at 16:59

## 2023-01-01 RX ADMIN — MIRTAZAPINE 7.5 MILLIGRAM(S): 45 TABLET, ORALLY DISINTEGRATING ORAL at 12:09

## 2023-01-01 RX ADMIN — Medication 1 MILLIGRAM(S): at 11:35

## 2023-01-01 RX ADMIN — BUDESONIDE AND FORMOTEROL FUMARATE DIHYDRATE 2 PUFF(S): 160; 4.5 AEROSOL RESPIRATORY (INHALATION) at 18:21

## 2023-01-01 RX ADMIN — Medication 15 MILLILITER(S): at 05:41

## 2023-01-01 RX ADMIN — Medication 2 SPRAY(S): at 00:18

## 2023-01-01 RX ADMIN — Medication 20 MILLIGRAM(S): at 05:27

## 2023-01-01 RX ADMIN — Medication 1 TABLET(S): at 11:25

## 2023-01-01 RX ADMIN — Medication 100 MILLIEQUIVALENT(S): at 14:10

## 2023-01-01 RX ADMIN — CITALOPRAM 10 MILLIGRAM(S): 10 TABLET, FILM COATED ORAL at 13:37

## 2023-01-01 RX ADMIN — Medication 100 MILLIGRAM(S): at 05:16

## 2023-01-01 RX ADMIN — Medication 2 SPRAY(S): at 11:58

## 2023-01-01 RX ADMIN — MIRTAZAPINE 7.5 MILLIGRAM(S): 45 TABLET, ORALLY DISINTEGRATING ORAL at 11:22

## 2023-01-01 RX ADMIN — Medication 25 MILLIGRAM(S): at 18:23

## 2023-01-01 RX ADMIN — Medication 25 MILLIGRAM(S): at 17:57

## 2023-01-01 RX ADMIN — Medication 105.2 MILLIGRAM(S): at 06:27

## 2023-01-01 RX ADMIN — SENNA PLUS 1 TABLET(S): 8.6 TABLET ORAL at 21:55

## 2023-01-01 RX ADMIN — Medication 1 SPRAY(S): at 17:06

## 2023-01-01 RX ADMIN — Medication 500 MICROGRAM(S): at 17:09

## 2023-01-01 RX ADMIN — Medication 500 MICROGRAM(S): at 23:38

## 2023-01-01 RX ADMIN — Medication 650 MILLIGRAM(S): at 13:43

## 2023-01-01 RX ADMIN — Medication 100 MILLIEQUIVALENT(S): at 11:57

## 2023-01-01 RX ADMIN — Medication 40 MILLIEQUIVALENT(S): at 18:10

## 2023-01-01 RX ADMIN — MEROPENEM 100 MILLIGRAM(S): 1 INJECTION INTRAVENOUS at 05:35

## 2023-01-01 RX ADMIN — Medication 1 SPRAY(S): at 06:30

## 2023-01-01 RX ADMIN — Medication 1 SPRAY(S): at 20:29

## 2023-01-01 RX ADMIN — Medication 1 MILLIGRAM(S): at 13:29

## 2023-01-01 RX ADMIN — Medication 25 GRAM(S): at 17:13

## 2023-01-01 RX ADMIN — Medication 600 MILLIGRAM(S): at 17:12

## 2023-01-01 RX ADMIN — Medication 25 MILLIGRAM(S): at 17:00

## 2023-01-01 RX ADMIN — Medication 500 MICROGRAM(S): at 18:07

## 2023-01-01 RX ADMIN — Medication 1 TABLET(S): at 11:09

## 2023-01-01 RX ADMIN — Medication 100 MILLIGRAM(S): at 13:40

## 2023-01-01 RX ADMIN — Medication 650 MILLIGRAM(S): at 23:53

## 2023-01-01 RX ADMIN — Medication 25 MILLIGRAM(S): at 17:19

## 2023-01-01 RX ADMIN — Medication 20 MILLIGRAM(S): at 05:35

## 2023-01-01 RX ADMIN — Medication 1 MILLIGRAM(S): at 12:32

## 2023-01-01 RX ADMIN — Medication 1 TABLET(S): at 11:39

## 2023-01-01 RX ADMIN — Medication 20 MILLIGRAM(S): at 05:54

## 2023-01-01 RX ADMIN — Medication 40 MILLIEQUIVALENT(S): at 00:36

## 2023-01-01 RX ADMIN — Medication 25 MILLIGRAM(S): at 06:09

## 2023-01-01 RX ADMIN — Medication 500 MICROGRAM(S): at 06:04

## 2023-01-01 RX ADMIN — Medication 1 SPRAY(S): at 05:21

## 2023-01-01 RX ADMIN — Medication 20 MILLIGRAM(S): at 14:34

## 2023-01-01 RX ADMIN — DECITABINE 30 MILLIGRAM(S): 50 INJECTION, POWDER, LYOPHILIZED, FOR SOLUTION INTRAVENOUS at 12:45

## 2023-01-01 RX ADMIN — Medication 1 SPRAY(S): at 17:46

## 2023-01-01 RX ADMIN — Medication 500 MICROGRAM(S): at 21:55

## 2023-01-01 RX ADMIN — Medication 1 SPRAY(S): at 06:14

## 2023-01-01 RX ADMIN — Medication 500 MICROGRAM(S): at 16:02

## 2023-01-01 RX ADMIN — Medication 1 TABLET(S): at 12:32

## 2023-01-01 RX ADMIN — PANTOPRAZOLE SODIUM 40 MILLIGRAM(S): 20 TABLET, DELAYED RELEASE ORAL at 05:34

## 2023-01-01 RX ADMIN — CEFEPIME 100 MILLIGRAM(S): 1 INJECTION, POWDER, FOR SOLUTION INTRAMUSCULAR; INTRAVENOUS at 18:01

## 2023-01-01 RX ADMIN — Medication 650 MILLIGRAM(S): at 05:34

## 2023-01-01 RX ADMIN — Medication 1 SPRAY(S): at 05:22

## 2023-01-01 RX ADMIN — MIRTAZAPINE 7.5 MILLIGRAM(S): 45 TABLET, ORALLY DISINTEGRATING ORAL at 11:38

## 2023-01-01 RX ADMIN — Medication 15 MILLILITER(S): at 18:06

## 2023-01-01 RX ADMIN — Medication 600 MILLIGRAM(S): at 05:19

## 2023-01-01 RX ADMIN — Medication 1 TABLET(S): at 13:38

## 2023-01-01 RX ADMIN — Medication 25 MILLIGRAM(S): at 06:28

## 2023-01-01 RX ADMIN — BUDESONIDE AND FORMOTEROL FUMARATE DIHYDRATE 2 PUFF(S): 160; 4.5 AEROSOL RESPIRATORY (INHALATION) at 05:37

## 2023-01-01 RX ADMIN — BUDESONIDE AND FORMOTEROL FUMARATE DIHYDRATE 2 PUFF(S): 160; 4.5 AEROSOL RESPIRATORY (INHALATION) at 17:47

## 2023-01-01 RX ADMIN — Medication 2000 UNIT(S): at 11:25

## 2023-01-01 RX ADMIN — Medication 650 MILLIGRAM(S): at 14:12

## 2023-01-01 RX ADMIN — Medication 600 MILLIGRAM(S): at 05:38

## 2023-01-01 RX ADMIN — SODIUM CHLORIDE 1 GRAM(S): 9 INJECTION INTRAMUSCULAR; INTRAVENOUS; SUBCUTANEOUS at 05:50

## 2023-01-01 RX ADMIN — DIPHENHYDRAMINE HYDROCHLORIDE AND LIDOCAINE HYDROCHLORIDE AND ALUMINUM HYDROXIDE AND MAGNESIUM HYDRO 10 MILLILITER(S): KIT at 12:37

## 2023-01-01 RX ADMIN — Medication 650 MILLIGRAM(S): at 04:17

## 2023-01-01 RX ADMIN — Medication 25 MILLIGRAM(S): at 05:15

## 2023-01-01 RX ADMIN — DIPHENHYDRAMINE HYDROCHLORIDE AND LIDOCAINE HYDROCHLORIDE AND ALUMINUM HYDROXIDE AND MAGNESIUM HYDRO 10 MILLILITER(S): KIT at 23:31

## 2023-01-01 RX ADMIN — Medication 600 MILLIGRAM(S): at 17:24

## 2023-01-01 RX ADMIN — Medication 50 MILLIEQUIVALENT(S): at 17:59

## 2023-01-01 RX ADMIN — Medication 15 MILLILITER(S): at 06:52

## 2023-01-01 RX ADMIN — Medication 25 MILLIGRAM(S): at 17:49

## 2023-01-01 RX ADMIN — Medication 1 TABLET(S): at 12:16

## 2023-01-01 RX ADMIN — Medication 500 MICROGRAM(S): at 12:27

## 2023-01-01 RX ADMIN — CHLORHEXIDINE GLUCONATE 1 APPLICATION(S): 213 SOLUTION TOPICAL at 11:44

## 2023-01-01 RX ADMIN — Medication 500 MICROGRAM(S): at 12:08

## 2023-01-01 RX ADMIN — Medication 25 MILLIGRAM(S): at 05:54

## 2023-01-01 RX ADMIN — Medication 1 MILLIGRAM(S): at 11:30

## 2023-01-01 RX ADMIN — Medication 1 TABLET(S): at 13:37

## 2023-01-01 RX ADMIN — CITALOPRAM 10 MILLIGRAM(S): 10 TABLET, FILM COATED ORAL at 11:34

## 2023-01-01 RX ADMIN — Medication 500 MICROGRAM(S): at 06:31

## 2023-01-01 RX ADMIN — Medication 1 MILLIGRAM(S): at 11:49

## 2023-01-01 RX ADMIN — Medication 25 MILLIGRAM(S): at 06:50

## 2023-01-01 RX ADMIN — BUDESONIDE AND FORMOTEROL FUMARATE DIHYDRATE 2 PUFF(S): 160; 4.5 AEROSOL RESPIRATORY (INHALATION) at 05:16

## 2023-01-01 RX ADMIN — Medication 500 MICROGRAM(S): at 11:26

## 2023-01-01 RX ADMIN — Medication 250 MILLIGRAM(S): at 18:10

## 2023-01-01 RX ADMIN — CHLORHEXIDINE GLUCONATE 1 APPLICATION(S): 213 SOLUTION TOPICAL at 12:09

## 2023-01-01 RX ADMIN — BUDESONIDE AND FORMOTEROL FUMARATE DIHYDRATE 2 PUFF(S): 160; 4.5 AEROSOL RESPIRATORY (INHALATION) at 06:29

## 2023-01-01 RX ADMIN — SENNA PLUS 1 TABLET(S): 8.6 TABLET ORAL at 21:26

## 2023-01-01 RX ADMIN — Medication 15 MILLILITER(S): at 06:03

## 2023-01-01 RX ADMIN — Medication 600 MILLIGRAM(S): at 06:21

## 2023-01-01 RX ADMIN — Medication 1 TABLET(S): at 11:38

## 2023-01-01 RX ADMIN — PANTOPRAZOLE SODIUM 40 MILLIGRAM(S): 20 TABLET, DELAYED RELEASE ORAL at 05:35

## 2023-01-01 RX ADMIN — Medication 600 MILLIGRAM(S): at 17:42

## 2023-01-01 RX ADMIN — Medication 650 MILLIGRAM(S): at 22:56

## 2023-01-01 RX ADMIN — Medication 2 SPRAY(S): at 18:06

## 2023-01-01 RX ADMIN — Medication 1 MILLIGRAM(S): at 11:08

## 2023-01-01 RX ADMIN — Medication 650 MILLIGRAM(S): at 12:16

## 2023-01-01 RX ADMIN — Medication 500 MICROGRAM(S): at 12:57

## 2023-01-01 RX ADMIN — Medication 15 MILLILITER(S): at 12:34

## 2023-01-01 RX ADMIN — CITALOPRAM 10 MILLIGRAM(S): 10 TABLET, FILM COATED ORAL at 11:57

## 2023-01-01 RX ADMIN — MIRTAZAPINE 7.5 MILLIGRAM(S): 45 TABLET, ORALLY DISINTEGRATING ORAL at 13:38

## 2023-01-01 RX ADMIN — Medication 500 MICROGRAM(S): at 23:27

## 2023-01-01 RX ADMIN — MIRTAZAPINE 7.5 MILLIGRAM(S): 45 TABLET, ORALLY DISINTEGRATING ORAL at 11:34

## 2023-01-01 RX ADMIN — CITALOPRAM 10 MILLIGRAM(S): 10 TABLET, FILM COATED ORAL at 11:25

## 2023-01-01 RX ADMIN — Medication 100 MILLIGRAM(S): at 06:20

## 2023-01-01 RX ADMIN — Medication 1 SPRAY(S): at 17:57

## 2023-01-01 RX ADMIN — PANTOPRAZOLE SODIUM 40 MILLIGRAM(S): 20 TABLET, DELAYED RELEASE ORAL at 07:06

## 2023-01-01 RX ADMIN — BUDESONIDE AND FORMOTEROL FUMARATE DIHYDRATE 2 PUFF(S): 160; 4.5 AEROSOL RESPIRATORY (INHALATION) at 05:21

## 2023-01-01 RX ADMIN — MIRTAZAPINE 7.5 MILLIGRAM(S): 45 TABLET, ORALLY DISINTEGRATING ORAL at 11:57

## 2023-01-01 RX ADMIN — Medication 650 MILLIGRAM(S): at 05:30

## 2023-01-01 RX ADMIN — Medication 1 MILLIGRAM(S): at 13:37

## 2023-01-01 RX ADMIN — Medication 500 MICROGRAM(S): at 00:22

## 2023-01-01 RX ADMIN — Medication 2000 UNIT(S): at 12:27

## 2023-01-01 RX ADMIN — SENNA PLUS 1 TABLET(S): 8.6 TABLET ORAL at 21:35

## 2023-01-01 RX ADMIN — Medication 500 MICROGRAM(S): at 00:17

## 2023-01-01 RX ADMIN — Medication 500 MICROGRAM(S): at 06:03

## 2023-01-01 RX ADMIN — Medication 650 MILLIGRAM(S): at 05:39

## 2023-01-01 RX ADMIN — Medication 500 MICROGRAM(S): at 18:33

## 2023-01-01 RX ADMIN — Medication 25 MILLIGRAM(S): at 05:19

## 2023-01-01 RX ADMIN — MIRTAZAPINE 7.5 MILLIGRAM(S): 45 TABLET, ORALLY DISINTEGRATING ORAL at 11:39

## 2023-01-01 RX ADMIN — Medication 15 MILLILITER(S): at 17:23

## 2023-01-01 RX ADMIN — Medication 1 SPRAY(S): at 05:41

## 2023-01-01 RX ADMIN — Medication 1 MILLIGRAM(S): at 11:38

## 2023-01-01 RX ADMIN — Medication 105 MILLIGRAM(S): at 19:49

## 2023-01-01 RX ADMIN — Medication 600 MILLIGRAM(S): at 18:13

## 2023-01-01 RX ADMIN — Medication 650 MILLIGRAM(S): at 23:08

## 2023-01-01 RX ADMIN — DIPHENHYDRAMINE HYDROCHLORIDE AND LIDOCAINE HYDROCHLORIDE AND ALUMINUM HYDROXIDE AND MAGNESIUM HYDRO 10 MILLILITER(S): KIT at 17:10

## 2023-01-01 RX ADMIN — BUDESONIDE AND FORMOTEROL FUMARATE DIHYDRATE 2 PUFF(S): 160; 4.5 AEROSOL RESPIRATORY (INHALATION) at 17:57

## 2023-01-01 RX ADMIN — PANTOPRAZOLE SODIUM 40 MILLIGRAM(S): 20 TABLET, DELAYED RELEASE ORAL at 05:19

## 2023-01-01 RX ADMIN — Medication 1 SPRAY(S): at 17:10

## 2023-01-01 RX ADMIN — Medication 500 MICROGRAM(S): at 11:50

## 2023-01-01 RX ADMIN — Medication 500 MICROGRAM(S): at 05:33

## 2023-01-01 RX ADMIN — Medication 100 MILLIEQUIVALENT(S): at 11:59

## 2023-01-01 RX ADMIN — SENNA PLUS 1 TABLET(S): 8.6 TABLET ORAL at 21:58

## 2023-01-01 RX ADMIN — Medication 15 MILLILITER(S): at 00:11

## 2023-01-01 RX ADMIN — MIRTAZAPINE 7.5 MILLIGRAM(S): 45 TABLET, ORALLY DISINTEGRATING ORAL at 17:53

## 2023-01-01 RX ADMIN — Medication 600 MILLIGRAM(S): at 17:57

## 2023-01-01 RX ADMIN — Medication 2000 UNIT(S): at 11:37

## 2023-01-01 RX ADMIN — Medication 2 SPRAY(S): at 11:10

## 2023-01-01 RX ADMIN — Medication 500 MICROGRAM(S): at 17:08

## 2023-01-01 RX ADMIN — Medication 20 MILLIGRAM(S): at 05:15

## 2023-01-01 RX ADMIN — HYDROMORPHONE HYDROCHLORIDE 0.5 MILLIGRAM(S): 2 INJECTION INTRAMUSCULAR; INTRAVENOUS; SUBCUTANEOUS at 23:20

## 2023-01-01 RX ADMIN — DECITABINE 30 MILLIGRAM(S): 50 INJECTION, POWDER, LYOPHILIZED, FOR SOLUTION INTRAVENOUS at 16:30

## 2023-01-01 RX ADMIN — Medication 500 MICROGRAM(S): at 00:19

## 2023-01-01 RX ADMIN — Medication 250 MILLIGRAM(S): at 08:50

## 2023-01-01 RX ADMIN — Medication 500 MILLIGRAM(S): at 23:10

## 2023-01-01 RX ADMIN — Medication 500 MICROGRAM(S): at 13:00

## 2023-01-01 RX ADMIN — Medication 2 SPRAY(S): at 05:23

## 2023-01-01 RX ADMIN — Medication 500 MICROGRAM(S): at 00:56

## 2023-01-01 RX ADMIN — HYDROMORPHONE HYDROCHLORIDE 0.5 MILLIGRAM(S): 2 INJECTION INTRAMUSCULAR; INTRAVENOUS; SUBCUTANEOUS at 07:19

## 2023-01-01 RX ADMIN — Medication 500 MICROGRAM(S): at 13:44

## 2023-01-01 RX ADMIN — CITALOPRAM 10 MILLIGRAM(S): 10 TABLET, FILM COATED ORAL at 12:08

## 2023-01-01 RX ADMIN — CHLORHEXIDINE GLUCONATE 1 APPLICATION(S): 213 SOLUTION TOPICAL at 12:21

## 2023-01-01 RX ADMIN — DIPHENHYDRAMINE HYDROCHLORIDE AND LIDOCAINE HYDROCHLORIDE AND ALUMINUM HYDROXIDE AND MAGNESIUM HYDRO 10 MILLILITER(S): KIT at 01:46

## 2023-01-01 RX ADMIN — Medication 2 SPRAY(S): at 18:11

## 2023-01-01 RX ADMIN — Medication 600 MILLIGRAM(S): at 05:55

## 2023-01-01 RX ADMIN — Medication 1000 MILLIGRAM(S): at 13:30

## 2023-01-01 RX ADMIN — Medication 40 MILLIEQUIVALENT(S): at 14:42

## 2023-01-01 RX ADMIN — CITALOPRAM 10 MILLIGRAM(S): 10 TABLET, FILM COATED ORAL at 14:37

## 2023-01-01 RX ADMIN — Medication 500 MICROGRAM(S): at 11:37

## 2023-01-01 RX ADMIN — HYDROMORPHONE HYDROCHLORIDE 0.5 MILLIGRAM(S): 2 INJECTION INTRAMUSCULAR; INTRAVENOUS; SUBCUTANEOUS at 11:26

## 2023-01-01 RX ADMIN — Medication 650 MILLIGRAM(S): at 21:50

## 2023-01-01 RX ADMIN — Medication 1 SPRAY(S): at 05:51

## 2023-01-01 RX ADMIN — Medication 25 MILLIGRAM(S): at 17:54

## 2023-01-01 RX ADMIN — SENNA PLUS 1 TABLET(S): 8.6 TABLET ORAL at 11:38

## 2023-01-01 RX ADMIN — Medication 25 MILLIGRAM(S): at 17:42

## 2023-01-01 RX ADMIN — DIPHENHYDRAMINE HYDROCHLORIDE AND LIDOCAINE HYDROCHLORIDE AND ALUMINUM HYDROXIDE AND MAGNESIUM HYDRO 10 MILLILITER(S): KIT at 05:21

## 2023-01-01 RX ADMIN — DIPHENHYDRAMINE HYDROCHLORIDE AND LIDOCAINE HYDROCHLORIDE AND ALUMINUM HYDROXIDE AND MAGNESIUM HYDRO 10 MILLILITER(S): KIT at 06:01

## 2023-01-01 RX ADMIN — Medication 650 MILLIGRAM(S): at 13:03

## 2023-01-01 RX ADMIN — Medication 1 MILLIGRAM(S): at 11:22

## 2023-01-01 RX ADMIN — Medication 1 SPRAY(S): at 17:33

## 2023-01-01 RX ADMIN — Medication 500 MICROGRAM(S): at 17:42

## 2023-01-01 RX ADMIN — Medication 500 MICROGRAM(S): at 23:30

## 2023-01-01 RX ADMIN — CEFEPIME 100 MILLIGRAM(S): 1 INJECTION, POWDER, FOR SOLUTION INTRAMUSCULAR; INTRAVENOUS at 05:21

## 2023-01-01 RX ADMIN — Medication 2 SPRAY(S): at 23:25

## 2023-01-01 RX ADMIN — DIPHENHYDRAMINE HYDROCHLORIDE AND LIDOCAINE HYDROCHLORIDE AND ALUMINUM HYDROXIDE AND MAGNESIUM HYDRO 10 MILLILITER(S): KIT at 00:11

## 2023-01-01 RX ADMIN — Medication 500 MICROGRAM(S): at 00:49

## 2023-01-01 RX ADMIN — Medication 650 MILLIGRAM(S): at 07:04

## 2023-01-01 RX ADMIN — Medication 1 SPRAY(S): at 16:59

## 2023-01-01 RX ADMIN — DIPHENHYDRAMINE HYDROCHLORIDE AND LIDOCAINE HYDROCHLORIDE AND ALUMINUM HYDROXIDE AND MAGNESIUM HYDRO 10 MILLILITER(S): KIT at 17:39

## 2023-01-01 RX ADMIN — Medication 650 MILLIGRAM(S): at 05:15

## 2023-01-01 RX ADMIN — Medication 500 MICROGRAM(S): at 11:08

## 2023-01-01 RX ADMIN — Medication 2 SPRAY(S): at 17:54

## 2023-01-01 RX ADMIN — Medication 2 SPRAY(S): at 17:05

## 2023-01-01 RX ADMIN — BUDESONIDE AND FORMOTEROL FUMARATE DIHYDRATE 2 PUFF(S): 160; 4.5 AEROSOL RESPIRATORY (INHALATION) at 17:33

## 2023-01-01 RX ADMIN — HYDROMORPHONE HYDROCHLORIDE 0.5 MILLIGRAM(S): 2 INJECTION INTRAMUSCULAR; INTRAVENOUS; SUBCUTANEOUS at 18:00

## 2023-01-01 RX ADMIN — Medication 500 MICROGRAM(S): at 05:54

## 2023-01-01 RX ADMIN — BUDESONIDE AND FORMOTEROL FUMARATE DIHYDRATE 2 PUFF(S): 160; 4.5 AEROSOL RESPIRATORY (INHALATION) at 06:52

## 2023-01-01 RX ADMIN — CEFEPIME 100 MILLIGRAM(S): 1 INJECTION, POWDER, FOR SOLUTION INTRAMUSCULAR; INTRAVENOUS at 05:54

## 2023-01-01 RX ADMIN — MEROPENEM 100 MILLIGRAM(S): 1 INJECTION INTRAVENOUS at 17:55

## 2023-01-01 RX ADMIN — SENNA PLUS 1 TABLET(S): 8.6 TABLET ORAL at 21:11

## 2023-01-01 RX ADMIN — Medication 15 MILLILITER(S): at 11:25

## 2023-01-01 RX ADMIN — Medication 650 MILLIGRAM(S): at 14:37

## 2023-01-01 RX ADMIN — Medication 100 MILLIGRAM(S): at 21:19

## 2023-01-01 RX ADMIN — POTASSIUM PHOSPHATE, MONOBASIC POTASSIUM PHOSPHATE, DIBASIC 62.5 MILLIMOLE(S): 236; 224 INJECTION, SOLUTION INTRAVENOUS at 08:18

## 2023-01-01 RX ADMIN — Medication 100 MILLIGRAM(S): at 17:14

## 2023-01-01 RX ADMIN — Medication 2 SPRAY(S): at 23:26

## 2023-01-01 RX ADMIN — DECITABINE 30 MILLIGRAM(S): 50 INJECTION, POWDER, LYOPHILIZED, FOR SOLUTION INTRAVENOUS at 14:53

## 2023-01-01 RX ADMIN — CHLORHEXIDINE GLUCONATE 1 APPLICATION(S): 213 SOLUTION TOPICAL at 11:57

## 2023-01-01 RX ADMIN — CITALOPRAM 10 MILLIGRAM(S): 10 TABLET, FILM COATED ORAL at 11:43

## 2023-01-01 RX ADMIN — Medication 200 MILLIGRAM(S): at 22:39

## 2023-01-01 RX ADMIN — Medication 25 MILLIGRAM(S): at 05:38

## 2023-01-01 RX ADMIN — CITALOPRAM 10 MILLIGRAM(S): 10 TABLET, FILM COATED ORAL at 11:09

## 2023-01-01 RX ADMIN — Medication 2000 UNIT(S): at 11:10

## 2023-01-01 RX ADMIN — PANTOPRAZOLE SODIUM 40 MILLIGRAM(S): 20 TABLET, DELAYED RELEASE ORAL at 05:20

## 2023-01-01 RX ADMIN — Medication 600 MILLIGRAM(S): at 17:00

## 2023-01-01 RX ADMIN — Medication 2000 UNIT(S): at 11:39

## 2023-01-01 RX ADMIN — Medication 650 MILLIGRAM(S): at 14:55

## 2023-01-01 RX ADMIN — Medication 100 MILLIGRAM(S): at 06:32

## 2023-01-01 RX ADMIN — Medication 500 MICROGRAM(S): at 17:52

## 2023-01-01 RX ADMIN — Medication 2000 UNIT(S): at 12:16

## 2023-01-01 RX ADMIN — DIPHENHYDRAMINE HYDROCHLORIDE AND LIDOCAINE HYDROCHLORIDE AND ALUMINUM HYDROXIDE AND MAGNESIUM HYDRO 10 MILLILITER(S): KIT at 18:07

## 2023-01-01 RX ADMIN — PANTOPRAZOLE SODIUM 40 MILLIGRAM(S): 20 TABLET, DELAYED RELEASE ORAL at 06:32

## 2023-01-01 RX ADMIN — Medication 500 MICROGRAM(S): at 01:00

## 2023-01-01 RX ADMIN — Medication 100 MILLIEQUIVALENT(S): at 14:36

## 2023-01-01 RX ADMIN — Medication 25 MILLIGRAM(S): at 06:21

## 2023-01-01 RX ADMIN — Medication 600 MILLIGRAM(S): at 17:49

## 2023-01-01 RX ADMIN — Medication 100 MILLIGRAM(S): at 13:49

## 2023-01-01 RX ADMIN — Medication 400 MILLIGRAM(S): at 20:32

## 2023-01-01 RX ADMIN — Medication 2 SPRAY(S): at 17:46

## 2023-01-01 RX ADMIN — Medication 650 MILLIGRAM(S): at 04:38

## 2023-01-01 RX ADMIN — Medication 15 MILLILITER(S): at 23:25

## 2023-01-01 RX ADMIN — Medication 600 MILLIGRAM(S): at 06:30

## 2023-01-01 RX ADMIN — Medication 650 MILLIGRAM(S): at 22:17

## 2023-01-01 RX ADMIN — Medication 1 SPRAY(S): at 05:35

## 2023-01-01 RX ADMIN — Medication 1 TABLET(S): at 11:50

## 2023-01-01 RX ADMIN — Medication 25 MILLIGRAM(S): at 06:29

## 2023-01-01 RX ADMIN — SODIUM CHLORIDE 1 GRAM(S): 9 INJECTION INTRAMUSCULAR; INTRAVENOUS; SUBCUTANEOUS at 21:11

## 2023-01-01 RX ADMIN — Medication 600 MILLIGRAM(S): at 17:56

## 2023-01-01 RX ADMIN — Medication 650 MILLIGRAM(S): at 00:17

## 2023-01-01 RX ADMIN — Medication 500 MILLIGRAM(S): at 19:58

## 2023-01-01 RX ADMIN — CHLORHEXIDINE GLUCONATE 1 APPLICATION(S): 213 SOLUTION TOPICAL at 13:22

## 2023-01-01 NOTE — ED PROVIDER NOTE - WR ORDER NAME 1
Speech Language Pathology  Infant Feeding Daily Note     Patient Name: Baby Bebeto Burnhamras  AGE:  2 wk.o., SEX:  female  Medical Record #: [id number]  Date of Service: 2023      Precautions: Swallow Precautions    Current Supports  NICU: None  Parents/Family Present:No     Current Feeding Status  Nipple: Dr. Brown's Preemie  Formula/EMBM: Enfamil Term  RN report: Infant is ad nora and is now off of LFNC.  She did lose weight last night.      TODAY'S FEEDING:    Oral readiness: Rooting and / or bringing Hands to Mouth.   Nipple/Bottle used:  Dr. Brown's Preemie and Dr. Page's Transition  Feeder:SLP  Amount Taken: 80 mLs  Goal Amount: 50 mLs is minimal goal  Feeding Position: swaddled , elevated, and sidelying   Feeding Length: 22 minutes  Strategies used: external pacing- cue based, nipple selection , and swaddle   Spit up: no  Anterior spillage: Mild  Recommended nipple: Dr. Page's Transition--return to the preemie nipple with any signs of intolerance    Behavior/State Control/Sensory Responses  Behavior/State Control: sustained appropriate alertness throughout    Stress Signs/Disengagement Cues  Shutting down and Frantic    State: Pre Feed: Active alert, Crying , and fussy            During Feed: Quiet alert            Post Feed: Quiet alert      Suck/Swallow/Breathe  Non-Nutritive Suck:  normal    Nutritive Suck: Suction: Strong and Moderate                           Coordinated:Immature    Rhythm: Immature and Integrated    Breaks in Suction: Yes                           Initiates Sucking: yes                                      Swallowing:  fluid loss from mouth   Respiratory: within normal limits      Comments: Infant was offered Dr. Page's bottle with preemie nipple per her current SLP POC.  Infant latched quickly, and fell into a fairly consistent sucking pattern, but did appear to be taking an inconsistent number of sucks per swallow and did appear to be working harder to transfer milk.  Given this, she  was trialed on the Transition nipple.  She latched, and initiated what appears to be a transitional sucking pattern with 3-11 sucks per burst before swallowing then breathing followed by a pause with return to sucking burst.  She only required pacing initially and then at the very end due to gulping.  She did not exhibit any overt S/Sx of aspiration with PO intake, and vital signs remained stable throughout this session.     Clinical Impressions:    In summary, infant continues to make gains towards her oral motor and feeding goals, and anticipate that infant should move through this sucking phase phase quickly as she transitions from an immature to more  mature oral motor coordination. She does appear to tolerate the flow from the TRANSITION nipple well with pacing as needed to minimize gulping and spillage. If for any reason she appears to demonstrate any intolerance to the faster flow, please return to the preemie nipple to ensure neuro protection.  SLP will continue to follow during her acute care stay and am available for parent questions/education as well.  Thank you very much for allowing us to participate in her care.    Recommendations:     Upgrade to the Dr. Page's bottle with the Transition nipple; however, please return to the preemie nipple with any signs of intolerance to the faster flow  FEEDING STRATEGIES:   Swaddle with arms up  Feed in elevated sidelying position  external pacing- cue based      Plan     SLP Treatment Plan:  Recommend Speech Therapy 3 times per week until therapy goals are met for the following treatments:  Feeding therapy;  Training and Patient / Family / Caregiver Education.    Anticipated Discharge Needs  Discharge Recommendations: Recommend [facility] follow up for continued progression toward developmental milestones  Therapy Recommendations Upon DC: Dysphagia Training, Patient / Family / Caregiver Education    Patient / Family Goals  Patient / Family Goal #1: for infant to have  positive oral experiences  Goal #1 Outcome: Progressing as expected  Short Term Goals  Short Term Goal # 1: Infant will be able to tolerate oral sensory stimullation with stable vital signs and no signs of stress  Goal Outcome # 1: Progressing as expected      Valerie Pires, SLP   Xray Chest 1 View- PORTABLE-Urgent

## 2023-01-05 PROBLEM — I77.6 ANCA-ASSOCIATED VASCULITIS: Status: ACTIVE | Noted: 2021-05-07

## 2023-01-18 NOTE — ED PROVIDER NOTE - ATTENDING APP SHARED VISIT CONTRIBUTION OF CARE
RGUJRAL 83yo f hx listed BIB EMS for Leukocytosis, anemia. Pt states for the past 2-3 she has increased weakness, decreased appetite. Pt noted to have cough, + SOB w exertion. Denies any abdominal pain. + Diarrhea, pt currently being treated for C Diff with oral vancomycin per [facility] medication list. Patient on supplemental O2, mild tachypnea, decreased bs at bases. Abd soft mild distention, non tender. Check labs, pan culture, CT chest/abd to eval for not limited to anemia, PE, pneumonia, CDiff. Closely monitor patient, case discussed and signed out to Dr. Villareal.

## 2023-01-18 NOTE — H&P ADULT - NSHPLABSRESULTS_GEN_ALL_CORE
LABS:                        5.9    45.18 )-----------( 68       ( 18 Jan 2023 16:08 )             18.2     01-18    136  |  102  |  16  ----------------------------<  149<H>  3.9   |  18<L>  |  0.69    Ca    8.0<L>      18 Jan 2023 16:08    TPro  5.9<L>  /  Alb  3.1<L>  /  TBili  0.6  /  DBili  x   /  AST  17  /  ALT  6<L>  /  AlkPhos  61  01-18    PT/INR - ( 18 Jan 2023 16:08 )   PT: 17.0 sec;   INR: 1.46 ratio         PTT - ( 18 Jan 2023 16:08 )  PTT:35.9 sec  CAPILLARY BLOOD GLUCOSE                RADIOLOGY & ADDITIONAL TESTS:    Imaging Personally Reviewed:  [x] YES  [ ] NO    Consultant(s) Notes Reviewed:  [x] YES  [ ] NO    Care Discussed with Consultants/Other Providers [x] YES  [ ] NO

## 2023-01-18 NOTE — ED PROVIDER NOTE - OBJECTIVE STATEMENT
83 yo female with pmh lymphoma (no longer on treatment), HTN, HLD, DVT on lovenox, vasculitis, LVATS here from [facility] for elevated WBC and low hemoglobin. Pt states for the past 2-3 days has been having increased weakness and lethargy, decreased appetite. +sputum productive cough. + cui, no sob, no difficulty breathing. No abdominal pain, nausea, vomiting, no bloody stools. Has endorsed multiple episodes of loose stools x weeks, currently being treated for c.diff with oral vancomycin.

## 2023-01-18 NOTE — H&P ADULT - NSHPREVIEWOFSYSTEMS_GEN_ALL_CORE
REVIEW OF SYSTEMS:  General: +weakness, no fever/chills, no weight loss/gain  Skin/Breast: no rash, no jaundice  Ophthalmologic: no vision changes, no dry eyes   Respiratory and Thorax: no cough, no wheezing, no hemoptysis, no dyspnea  Cardiovascular: no chest pain, + shortness of breath, no orthopnea  Gastrointestinal: no n/v. +diarrhea, no abdominal pain, no dysphagia   Genitourinary: no dysuria, no frequency, no nocturia, no hematuria  Musculoskeletal: no trauma, no sprain/strain, no myalgias, no arthralgias, no fracture  Neurological: no HA, no dizziness, no weakness, no numbness  Psychiatric: no depression, no SI/HI  Hematology/Lymphatics: no easy bruising  Endocrine: no heat or cold intolerance. no weight gain or loss  Allergic/Immunologic: no allergy or recent reaction

## 2023-01-18 NOTE — ED PROVIDER NOTE - PHYSICAL EXAMINATION
A&Ox3, NAD  Neck supple, no LAD  Lungs CTAB. + decreased lung sounds RLL,   Cardiac +S1S2, RRR, + systolic murmur, no r/g.   Abd soft, NT/ND, +BS, no rebound or guarding.   Extremities: cap refill <2, pulses in distal extremities 4+, no edema.   Skin without rash, diffuse petechiae upper/lower extremities, MM white  No focal Deficits

## 2023-01-18 NOTE — ED PROCEDURE NOTE - PROCEDURE ADDITIONAL DETAILS
Emergency Department Focused Ultrasound performed at patient's bedside for educational purposes. An appropriate follow up study is ordered. -Remedios Sadler PA-C

## 2023-01-18 NOTE — ED PROVIDER NOTE - SEPSIS CONTRAINDICATION FLUID ADMINISTRATION
Alert-The patient is alert, awake and responds to voice. The patient is oriented to time, place, and person. The triage nurse is able to obtain subjective information.
Not applicable

## 2023-01-18 NOTE — H&P ADULT - NSHPPHYSICALEXAM_GEN_ALL_CORE
Vital Signs Last 24 Hrs  T(C): 36.6 (18 Jan 2023 20:04), Max: 36.6 (18 Jan 2023 20:04)  T(F): 97.8 (18 Jan 2023 20:04), Max: 97.8 (18 Jan 2023 20:04)  HR: 88 (18 Jan 2023 20:04) (88 - 100)  BP: 129/57 (18 Jan 2023 20:04) (96/72 - 129/57)  BP(mean): 78 (18 Jan 2023 20:04) (78 - 78)  RR: 18 (18 Jan 2023 20:04) (18 - 18)  SpO2: 98% (18 Jan 2023 20:04) (97% - 98%)    Parameters below as of 18 Jan 2023 20:04  Patient On (Oxygen Delivery Method): nasal cannula  O2 Flow (L/min): 3      PHYSICAL EXAM:  GENERAL: NAD, well-developed, comfortable on nasal canula  HEAD:  Atraumatic, Normocephalic  EYES: EOMI, PERRLA, conjunctiva and sclera clear  NECK: Supple, No JVD  CHEST/LUNG: mild decrease breath sounds bilaterally; No wheeze   HEART: Regular rate and rhythm; No murmurs, rubs, or gallops  ABDOMEN: Soft, Nontender, Nondistended; Bowel sounds present  Neuro: AAOx3, no focal weakness, 5/5 b/l extremity strength  EXTREMITIES:  2+ Peripheral Pulses, No clubbing, cyanosis, trace b/l edema  SKIN: No rashes or lesions

## 2023-01-18 NOTE — ED ADULT NURSE NOTE - IS THE PATIENT ABLE TO BE SCREENED?
67 y/o M, with PMH significant for HTN and HLD, presents to the ED BIBA, s/p fall, around 7pm. Patient is a poor historian, states was walking through the parking lot of his apartment complex, when he stepped in a uneven surface, lost his balance and fell, denies any head trauma or LOC. Patient reports being unable to get on is own and was on the ground for approximately 2 hours, before a neighbor call an EMS. Patient admits having drinking alcohol (1 beer) yesterday prior to the fall. Patient uses a walker in a regular basis, reports has balance issues, does not specify what shelly of issues, however reports had a brain injury during childhood.  Denies fever, chills, chest pain, cough, SOB, abd pain, N/V/D, back pain, neck pain, numbness, tingling, LOC, or HA.    1) Acute Rhabdomyolysis secondary to mechanical fall  - on NS at 175 cc/hour; will continue  - CK level improving, continue to monitor  - repeat CK level in am  - OOB to chair and ambulation as tolerated    2) Mechanical fall  -Patient reports several episodes in the past, last fall a month ago  -Uses a walker in a regular basis  -Fall precautions  -PT recommends [facility] when available    3) RONAK: resolved  -Likely secondary to Rhabdo and Dehydration  -Will continue with IV hydration   -monitor Cr level, seems to be have resolved    4) HTN  -C/w Amlodipine and monitor    5) HLD  -hold Atorvastatin 40mg PO daily (home regimen)  -restart on discharge home    6) Prophylactic measure   -DVT Prophylaxis: Lovenox SQ     7) Anxiety; patient notes that he feels 'excited' most of the time; he doesn't want to be medicated for this  - started PRN xanax for anxiety  - hold off on other SSRI  - no hallucinations/psychosis present  - caretaker notes that patient has 1 beer a week on occasion; counseled her on avoidance given history of TBI    8) Leucocytosis; resolved and likely reactive    Dispo   -Likely to be discharge in next 48 hours if CK level continues to decline Yes

## 2023-01-18 NOTE — H&P ADULT - ASSESSMENT
82 yr old female with a PMHx of diffuse large B cell lymphoma in remission, non-hodgkin's lymphoma (chemoport), depression, HLD, GERD, PE/DVT (4/2021 no longer on Eliquis), ANCA-vasculitis (20 y/a, no meds), s/p LVATS, LUIS wedge resection (2021), recent direct admit for RVATS, RUL Nodule Biopsy w/ IR marking in [facility], path favoring vasculitis, treated with Decadron, then switched to PO prednisone, went to [facility]. She presented here from [facility] for elevated WBC and low hemoglobin, severe weakness. Pt states for the past 2-3 days has been having increased weakness and lethargy, decreased appetite. +sputum productive cough. + cui, no sob, no difficulty breathing. No abdominal pain, nausea, vomiting, no bloody stools. Has endorsed multiple episodes of loose stools x weeks, currently being treated for c.diff with oral vancomycin.    82 yr old female with a PMHx of diffuse large B cell lymphoma in remission, non-hodgkin's lymphoma (chemoport), depression, HLD, GERD, PE/DVT (4/2021 no longer on Eliquis), ANCA-vasculitis (20 y/a, no meds), s/p LVATS, LUIS wedge resection (2021), recent direct admit for RVATS, RUL Nodule Biopsy w/ IR marking in [facility], path favoring vasculitis, treated with Decadron, then switched to PO prednisone, went to [facility]. She presented here from [facility] for elevated WBC and low hemoglobin, severe weakness. Pt states for the past 2-3 days has been having increased weakness and lethargy, decreased appetite. +sputum productive cough. + cui, no sob, no difficulty breathing. No abdominal pain, nausea, vomiting, no bloody stools. Has endorsed multiple episodes of loose stools x weeks, currently being treated for c.diff with oral vancomycin.       Fatigue/dyspnea: likely due to severe anemia  - pt s/p 1 unit PRB  - no melena, no hematochezia. no BRBPR. occult stool neg.   - repeat post transfusion hgb  - likely poor appetite with iron deficiency  - no clear signs of infection, will monitor off abx.   - monitor for fluid overload, she tends to require IV Lasix intermittently post transfusion.  - check iron studies (though recent transfusion likely affect results), vit b12, D, folic, TSH.   - resume diet. doubt GI bleed   - Physical therapy. Out of bed to chair with assistance.     Diarrhea  - elevated WBC  - no documented c.diff PCR, re-ordered.  - continuing Vanco PO in the meantime.     Pulmonary vasculitis   - Recent TTE on 11/3/22 reviewed: normal LV. outpt card Dr. Ady Cooper  - PT also saw pulm Mack Tucker in the office, with some concern for her overall status. She was hypotensive to systolic 90s in the office.  (now BP improves s/p IVF here).   - s/p thoracoscopic biopsy of mediastinal lymph node and Lung wedge resection 11/10/22  - recent pleural effusion s/p 650 cc U/S-guided rt thoracentesis 11/17/22  - exudative but no neutrophilic predominance and initial Gram stain w/o organisms  - cultures neg.   - path results favoring vasculitis: no evidence for lymphoma. Cytology also came back negative.   - comfortable on nasal canula, better post diuretic last admission.   - rheum and heme-onc following previously   - no plan for immunosuppressants such as cellcept given recent treatment for COVID19  - can follow up with rehum/pulm/heme outpt   - c/w prednisone 20 mg qdaily. will consider rheum consult interms of taper regimen.     hx of Tachycardia    - cont low-dose metoprolol, 50 mg to 25 mg dose reduced in rehab.   - card consult (Dr. Hooker) in the past, if needed    Anxiety and depression  - stable, continue citalopram and Remeron.  - Pt denied SI/HI ideations, denied visual and auditory hallucinations.     GERD (gastroesophageal reflux disease)  - continue PPI    Hyperlipidemia.   - continue home ezetimibe. okay to hold if nonformulary   - Lipid panel    DVT ppx   - holding AC in the setting of anemia.  - will start Lovenox SQ if no evidence of bleeding and if hgb stable.

## 2023-01-18 NOTE — ED ADULT NURSE NOTE - FINAL NURSING ELECTRONIC SIGNATURE
[facility] Hospital Medicine  Progress Note    Patient Name: João Aguirre  MRN: [id number]  Patient Class: IP- Inpatient   Admission Date: 8/26/2018  Length of Stay: 5 days  Attending Physician: Gilles Lake MD  Primary Care Provider: Primary Doctor No        Subjective:     Principal Problem:Septic shock    HPI:  This is a 54-year-old male with a past medical history of end-stage renal disease on hemodialysis Tuesday, Thursday, and  Saturday, diabetes, chronic back pain on methadone, CAD with prior coronary stent placement 2 years ago, hypertension, prior pneumonia, prior stroke, and recurrent episodes of mid epigastric pain and vomiting but no formal diagnosis of gastroparesis who presents to emergency department for evaluation of epigastric, abdominal pain, and CP with inability to tolerate liquids or food for the past few days.  Patient was seen here in the emergency department yesterday where he underwent basic labs and was given antiemetics.  He was discharged home with a prescription for Reglan and Benadryl which she has not been able to fill secondary to being home vomiting. He presents today via EMS because the symptoms have not improved and are worsening.  He did not get dialysis yesterday.  He denies any shortness of breath but does have an exacerbation of his chronic back pain which is thought to be secondary to him vomiting. The patient is unable hold down his chronic pain medicine which includes methadone.  Patient does not have a medication list with him at this time.    Patient found to have elevated troponin of 0.763 and reports he has Hx of CAD with prior coronary stent placement 2 years ago but has not been following up with cardiology. He does report some left sided chest pain and occasionally diaphoresis along with his nausea and vomiting. His EKG currently with no significant St elevation or depression. He does have Hx ESRD requiring HD with prior elevated  troponins in past of 0.16-0.17. Patient discussed with Dr. Law. Will consult cardiology and trend troponins due to prior cardiac Hx and continue monitoring GI status as well.          Hospital Course:  He was noted to have an elevated troponin level and Cardiology was also consulted. Nephrology was consulted for renal/ HD management. Patient initially noted to be in SR, however, later he converted to atrial fibrillation with some RVR with heart rate up to 120-130s. Dr. Kee was notified and patient given IV Bblockade.  The patient later converted back to SR. He was placed on toprol Xl 50mg po daily.  The patient was noted to have a decrease in his platelets and sq heparin was discontinued. Developed fever with evidence of sepsis overnight, now on broad spectrum antibiotics with thombocytopenia and coagulopathy.    Interval History:   Follow up septic shock    Still intermittently hypoglycemic and remains on D10.  He is feeling much better, pain in right leg persists but improved.  MRI today without osteomyelitis.      Review of Systems   Constitutional: Negative for chills and fever.   Respiratory: Negative for cough and shortness of breath.    Cardiovascular: Negative for chest pain.   Gastrointestinal: Negative for abdominal pain, diarrhea, nausea and vomiting.     Objective:     Vital Signs (Most Recent):  Temp: 97.7 °F (36.5 °C) (08/31/18 1537)  Pulse: (!) 57 (08/31/18 1537)  Resp: 20 (08/31/18 1537)  BP: (!) 188/85 (08/31/18 1537)  SpO2: (!) 92 % (08/31/18 1635) Vital Signs (24h Range):  Temp:  [97.2 °F (36.2 °C)-98.2 °F (36.8 °C)] 97.7 °F (36.5 °C)  Pulse:  [46-91] 57  Resp:  [11-30] 20  SpO2:  [91 %-100 %] 92 %  BP: (108-198)/(59-88) 188/85     Weight: 92.9 kg (204 lb 12.9 oz)  Body mass index is 27.78 kg/m².    Intake/Output Summary (Last 24 hours) at 8/31/2018 1800  Last data filed at 8/31/2018 1300  Gross per 24 hour   Intake 710 ml   Output --   Net 710 ml      Physical Exam   Constitutional: He is  oriented to person, place, and time.   Chronically ill appearing man in no acute distress, appears much better, non toxic   Cardiovascular: Normal rate and regular rhythm.   No murmur heard.  Pulmonary/Chest: Effort normal and breath sounds normal. He has no wheezes. He has no rales.   Abdominal: Soft. Bowel sounds are normal. He exhibits no distension. There is no tenderness.   Musculoskeletal:   rle with stable thrombophelbitis, distal erythema, plantar ulcer with dressing in place without purulence   Neurological: He is alert and oriented to person, place, and time.       Significant Labs:   BMP:   Recent Labs   Lab  08/31/18 0348   GLU  102   NA  133*   K  4.8   CL  98   CO2  25   BUN  37*   CREATININE  4.3*   CALCIUM  8.4*     CBC:   Recent Labs   Lab  08/30/18 0315 08/31/18 0348   WBC  20.60*  15.00*   HGB  10.3*  10.1*   HCT  31.4*  30.4*   PLT  15*  42*     CMP:   Recent Labs   Lab  08/30/18 0315 08/30/18 2125 08/31/18 0348   NA  132*   --   133*   K  4.3   --   4.8   CL  96   --   98   CO2  24   --   25   GLU  96  130*  102   BUN  40*   --   37*   CREATININE  5.7*   --   4.3*   CALCIUM  8.0*   --   8.4*   PROT  5.2*   --   5.7*   ALBUMIN  1.7*   --   1.8*   BILITOT  1.2*   --   1.2*   ALKPHOS  173*   --   163*   AST  13   --   10   ALT  9*   --   9*   ANIONGAP  12   --   10   EGFRNONAA  11*   --   15*     Coagulation:   Recent Labs   Lab  08/31/18   0348   INR  1.4*       Significant Imaging: I have reviewed all pertinent imaging results/findings within the past 24 hours.    Assessment/Plan:      * Septic shock    Transferred to ICU 8/29 with hypotension  Complicated with hypoglycemia, thrombocytopenia, elevated INR with normal fibrinogen  Blood cultures with pasteurella and proteus.  Source likely his right foot ulcer with abscess that was drained by ID 8/30 with associated cellulitis and thrombophebitis  Much improved now, HD stable.   Transferred from ICU today  Appreciate ID  assistance  Continue zosyn  Repeat blood cultures and right foot wound cultures pending  Maintain euvolemia  MRI foot without evidence of persistent abscess or osteomyelitis            Thrombocytopenia    Likely from sepsis with underlying chronic liver disease.  Plts decreased to 15 8/30 and transfused 1 unit.  Improved today.  No evidence of bleeding.  Holding lovenox, recommend no heparin with HD.  Monitor closely        Hypoglycemia    Likely from sepsis, liver dysfunction, decreased PO intake.  Persistent but overall improved, still requiring D10 drip  Insulin and c-peptide level pending to rule out insulinoma   Wean D10 as tolerated          Chronic hepatitis C without hepatic coma    With Hx of possible liver cirrhosis  Likely contributing to thrombocytopenia  Ammonia normal  Monitor LFTs          Ischemic cardiomyopathy, LVEF 25%-30%    See elevated troponin  No evidence of acute CHF          Elevated troponin    Management per cardiology, not felt to be ACS  Suspect demand ischemia and ESRD as the etiology        ESRD (T,Th,Sat) dialysis onset 2013    HD 8/28  Nephrology following for renal/HD management  Electrolytes stable            Coronary artery disease involving native coronary artery of native heart without angina pectoris    With prior coronary stent placement 2016/ Hx ischemic cardiomyopathy-  Cardiology following  ON metoprolol.  Hold asa with thrombocytopenia          Paroxysmal atrial fibrillation with RVR    Management per cardiology.    Stable heart rate at present.  Careful with beta blocker/CCBs in the setting of hypotension          Chest pain    Chest pain free at present  See elevated troponin for plan          Benign hypertension with ESRD (end-stage renal disease)    Intermittently hypotensive  Stop minoxidil  Monitor closely          Methadone dependence    Methadone dose decreased with acute illness.  Continue to monitor closely          Cirrhosis of liver with ascites    Possible  diagnosis though CT abdomen did not note cirrhosis or ascites          Dehydration    Will give NS 500ml IV bolus today          Intractable vomiting with nausea    Likely from sepsis  Lipase and  Wbcs Wnl, Imaging and exam without evidence of acute abdominal process  Continue  PPI          Chronic back pain    With chronic pain syndrome-  Continue home methadone            VTE Risk Mitigation (From admission, onward)        Ordered     IP VTE HIGH RISK PATIENT  Once      08/26/18 1106     Place sequential compression device  Until discontinued      08/26/18 1106     Place ZACHARY hose  Until discontinued      08/26/18 1106              Gilles Lake MD  Department of Hospital Medicine   [facility]   19-Jan-2023 00:00 19-Jan-2023 00:11

## 2023-01-18 NOTE — ED ADULT NURSE REASSESSMENT NOTE - NS ED NURSE REASSESS COMMENT FT1
Received called from lab for critical value.  states that Hgb 6.3 and Hct 19.0 on blood gas. Pt receiving blood transfusion, repeat CBC sent awaiting results, will contact admitting team when CBC results.

## 2023-01-18 NOTE — H&P ADULT - HISTORY OF PRESENT ILLNESS
82 yr old female with a PMHx of diffuse large B cell lymphoma in remission, non-hodgkin's lymphoma (chemoport), depression, HLD, GERD, PE/DVT (4/2021 no longer on Eliquis), ANCA-vasculitis (20 y/a, no meds), s/p LVATS, LUIS wedge resection (2021), recent direct admit for RVATS, RUL Nodule Biopsy w/ IR marking in [facility], path favoring vasculitis, treated with Decadron, then switched to PO prednisone, went to [facility]. She presented here from [facility] for elevated WBC and low hemoglobin, severe weakness. Pt states for the past 2-3 days has been having increased weakness and lethargy, decreased appetite. +sputum productive cough. + cui, no sob, no difficulty breathing. No abdominal pain, nausea, vomiting, no bloody stools. Has endorsed multiple episodes of loose stools x weeks, currently being treated for c.diff with oral vancomycin.  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 She presented here from [facility] for elevated WBC and low hemoglobin, severe weakness. Pt states for the past 2-3 days has been having increased weakness and lethargy, decreased appetite. +sputum productive cough. + cui, no sob, no difficulty breathing. No abdominal pain, nausea, vomiting, no bloody stools. Has endorsed multiple episodes of loose stools x weeks, currently being treated for c.diff with oral vancomycin.

## 2023-01-19 NOTE — PHYSICAL THERAPY INITIAL EVALUATION ADULT - PERTINENT HX OF CURRENT PROBLEM, REHAB EVAL
81y/o F with a PMHx of diffuse large B cell lymphoma in remission, non-hodgkin's lymphoma (chemoport), depression, HLD, GERD, PE/DVT (4/2021 no longer on Eliquis), ANCA-vasculitis (20 y/a, no meds), s/p LVATS, LUIS wedge resection (2021), recent direct admit for RVATS, RUL Nodule Biopsy w/ IR marking in [facility], path favoring vasculitis, treated with Decadron, then switched to PO prednisone, went to [facility]. She presented here from [facility] for elevated WBC and low hemoglobin, severe weakness. Pt states for the past 2-3 days has been having increased weakness and lethargy, decreased appetite. +sputum productive cough. + cui, no sob, no difficulty breathing. No abdominal pain, nausea, vomiting, no bloody stools. Has endorsed multiple episodes of loose stools x weeks, currently being treated for c.diff with oral vancomycin. CTAngioChest: No pulmonary arterial embolism.Status post wedge resections in both upper lobes. 2 cm nodular opacity associated with the staple line in the right upper lobe is noted. Etiology is unclear. Multiple ill-defined opacities are noted within both lungs, more so in the right lower lobe. Exact etiology is unclear.Bilateral pleural effusions, right more than left. 81y/o F with a PMHx of diffuse large B cell lymphoma in remission, non-hodgkin's lymphoma (chemoport), depression, HLD, GERD, PE/DVT (4/2021 no longer on Eliquis), ANCA-vasculitis (20 y/a, no meds), s/p LVATS, LUIS wedge resection (2021), recent direct admit for RVATS, RUL Nodule Biopsy w/ IR marking in [facility], path favoring vasculitis, treated with Decadron, then switched to PO prednisone, went to [facility]. She presented here from [facility] for elevated WBC and low hemoglobin, severe weakness. Pt states for the past 2-3 days has been having increased weakness and lethargy, decreased appetite. +sputum productive cough. + cui, no sob, no difficulty breathing. No abdominal pain, nausea, vomiting, no bloody stools. Has endorsed multiple episodes of loose stools x weeks, currently being treated for c.diff with oral vancomycin. CTAngio Chest: No pulmonary arterial embolism. Status post wedge resections in both upper lobes. 2 cm nodular opacity associated with the staple line in the right upper lobe is noted. Etiology is unclear. Multiple ill-defined opacities are noted within both lungs, more so in the right lower lobe. Exact etiology is unclear.Bilateral pleural effusions, right more than left.

## 2023-01-19 NOTE — PROGRESS NOTE ADULT - ASSESSMENT
82 yr old female with a PMHx of diffuse large B cell lymphoma in remission, non-hodgkin's lymphoma (chemoport), depression, HLD, GERD, PE/DVT (4/2021 no longer on Eliquis), ANCA-vasculitis (20 y/a, no meds), s/p LVATS, LUIS wedge resection (2021), recent direct admit for RVATS, RUL Nodule Biopsy w/ IR marking in [facility], path favoring vasculitis, treated with Decadron, then switched to PO prednisone, went to [facility]. She presented here from [facility] for elevated WBC and low hemoglobin, severe weakness. Pt states for the past 2-3 days has been having increased weakness and lethargy, decreased appetite. +sputum productive cough. + cui, no sob, no difficulty breathing. No abdominal pain, nausea, vomiting, no bloody stools. Has endorsed multiple episodes of loose stools x weeks, currently being treated for c.diff with oral vancomycin.       Fatigue/dyspnea: likely due to severe anemia  - pt s/p 1 unit PRBC, then another 1 unit overnight.  - no melena, no hematochezia. no BRBPR. occult stool neg.   - repeat post transfusion hgb stable.  - likely poor appetite with iron deficiency  - no clear signs of infection, will monitor off abx.   - monitor for fluid overload, she tends to require IV Lasix intermittently post transfusion.  - IV Lasix 20 mg x 1 for basilar crackles post transfusion.   - iron studies (though recent transfusion likely affect results), vit b12, D, folic, TSH.   - resume diet. doubt GI bleed   - Physical therapy. Out of bed to chair with assistance.   - Please consult House heme/onc for pancytopenia with elevated WBC. Rheum eval for follow up treatment of vasculitis  (she is chronically on steroids). ID consulted to see if she may need PCP ppx regimen.    Diarrhea  - elevated WBC  - no documented c.diff PCR, re-ordered.  - continuing Vanco PO in the meantime.   - diarrhea completely resolved.  - will anticipate 10 days course empirically.     Pulmonary vasculitis   - Recent TTE on 11/3/22 reviewed: normal LV. outpt card Dr. Ady Cooper  - outpt pulm Mack Tucker   - s/p thoracoscopic biopsy of mediastinal lymph node and Lung wedge resection 11/10/22  - recent pleural effusion s/p 650 cc U/S-guided rt thoracentesis 11/17/22  - exudative but no neutrophilic predominance and initial Gram stain w/o organisms  - cultures neg.   - path results favoring vasculitis: no evidence for lymphoma. Cytology also came back negative.   - comfortable on nasal canula, better post diuretic last admission.   - rheum and heme-onc following previously, will reconsult.   - last admission, she was not started on immunosuppressants such as cellcept given recent treatment for COVID19 at that time  - c/w prednisone 20 mg qdaily. consider rheum consult interms of taper regimen.     hx of Tachycardia    - cont low-dose metoprolol, 50 mg to 25 mg dose reduced in rehab.   - card consult (Dr. Hooker) in the past, if needed    Anxiety and depression  - stable, continue citalopram and Remeron.  - Pt denied SI/HI ideations, denied visual and auditory hallucinations.     GERD (gastroesophageal reflux disease)  - continue PPI    Hyperlipidemia.   - continue home ezetimibe. okay to hold if nonformulary   - Lipid panel    DVT ppx   - holding AC in the setting of anemia, pancytopenia.   - will start Lovenox SQ if no evidence of bleeding and if hgb stable and if plts improves

## 2023-01-19 NOTE — PHYSICAL THERAPY INITIAL EVALUATION ADULT - ADDITIONAL COMMENTS
[facility] Pt states she lives alone in a house with 5 steps to enter, +HR. Pt states she resides on the 1st floor. Pt owns RW, cane, wheelchair and shower chair. Pt reports her son and friends assists her when needed.

## 2023-01-19 NOTE — PROGRESS NOTE ADULT - NSPROGADDITIONALINFOA_GEN_ALL_CORE
[facility] NOTIFIED FOR CTA PRE MED d/w pt and NP.    - Dr. MARY Arias ([facility])  - [phone]

## 2023-01-19 NOTE — PATIENT PROFILE ADULT - FALL HARM RISK - HARM RISK INTERVENTIONS

## 2023-01-19 NOTE — CHART NOTE - NSCHARTNOTEFT_GEN_A_CORE
Notified RN, repeat Hgb s/p 1u PRBC 6.9.  Currently patient is hemodynamically stable, VSS, no s/s fluid overload at this time, no overt signs of bleeding.  Will transfuse 2nd unit PRBC, keep Hgb >7 per Dr. Arias  check post transfusion CBC  monitor pt status closely    Era Wang NP  Medicine  [id number]

## 2023-01-19 NOTE — PROGRESS NOTE ADULT - SUBJECTIVE AND OBJECTIVE BOX
SUBJECTIVE/ OVERNIGHT EVENTS:  no further diarrhea  no cp, no sob, no n/v/d. no abdominal pain.  no headache, no dizziness.   basilar crackles post prbc transfusion, Lasix IV x 1 ordered      --------------------------------------------------------------------------------------------  LABS:                        10.1   42.59 )-----------( 35       ( 19 Jan 2023 11:20 )             29.5     01-19    138  |  106  |  15  ----------------------------<  112<H>  4.3   |  18<L>  |  0.57    Ca    8.7      19 Jan 2023 11:20  Phos  2.4     01-19  Mg     2.0     01-19    TPro  5.9<L>  /  Alb  3.1<L>  /  TBili  0.6  /  DBili  x   /  AST  17  /  ALT  6<L>  /  AlkPhos  61  01-18    PT/INR - ( 18 Jan 2023 16:08 )   PT: 17.0 sec;   INR: 1.46 ratio         PTT - ( 18 Jan 2023 16:08 )  PTT:35.9 sec  CAPILLARY BLOOD GLUCOSE        RADIOLOGY & ADDITIONAL TESTS:    Imaging Personally Reviewed:  [x] YES  [ ] NO    Consultant(s) Notes Reviewed:  [x] YES  [ ] NO    MEDICATIONS  (STANDING):  budesonide 160 MICROgram(s)/formoterol 4.5 MICROgram(s) Inhaler 2 Puff(s) Inhalation two times a day  cholecalciferol 2000 Unit(s) Oral daily  citalopram 10 milliGRAM(s) Oral daily  fluticasone propionate 50 MICROgram(s)/spray Nasal Spray 1 Spray(s) Both Nostrils two times a day  folic acid 1 milliGRAM(s) Oral daily  guaiFENesin  milliGRAM(s) Oral every 12 hours  influenza  Vaccine (HIGH DOSE) 0.7 milliLiter(s) IntraMuscular once  ipratropium    for Nebulization 500 MICROGram(s) Nebulizer every 6 hours  metoprolol tartrate 25 milliGRAM(s) Oral two times a day  mirtazapine 7.5 milliGRAM(s) Oral daily  pantoprazole    Tablet 40 milliGRAM(s) Oral before breakfast  polyethylene glycol 3350 17 Gram(s) Oral daily  predniSONE   Tablet 20 milliGRAM(s) Oral daily  senna 1 Tablet(s) Oral daily  sodium bicarbonate 650 milliGRAM(s) Oral three times a day  vancomycin    Solution 500 milliGRAM(s) Oral every 6 hours    MEDICATIONS  (PRN):  acetaminophen     Tablet .. 650 milliGRAM(s) Oral every 6 hours PRN Temp greater or equal to 38C (100.4F), Mild Pain (1 - 3)  aluminum hydroxide/magnesium hydroxide/simethicone Suspension 30 milliLiter(s) Oral every 4 hours PRN Dyspepsia  melatonin 3 milliGRAM(s) Oral at bedtime PRN Insomnia  ondansetron Injectable 4 milliGRAM(s) IV Push every 8 hours PRN Nausea and/or Vomiting      Care Discussed with Consultants/Other Providers [x] YES  [ ] NO    Vital Signs Last 24 Hrs  T(C): 36.4 (19 Jan 2023 13:37), Max: 36.8 (19 Jan 2023 11:16)  T(F): 97.6 (19 Jan 2023 13:37), Max: 98.3 (19 Jan 2023 11:16)  HR: 86 (19 Jan 2023 13:37) (80 - 88)  BP: 131/80 (19 Jan 2023 13:37) (121/59 - 138/56)  BP(mean): 78 (18 Jan 2023 20:04) (78 - 78)  RR: 18 (19 Jan 2023 13:37) (18 - 20)  SpO2: 100% (19 Jan 2023 13:37) (96% - 100%)    Parameters below as of 19 Jan 2023 13:37  Patient On (Oxygen Delivery Method): nasal cannula  O2 Flow (L/min): 2    I&O's Summary    19 Jan 2023 07:01  -  19 Jan 2023 19:11  --------------------------------------------------------  IN: 650 mL / OUT: 0 mL / NET: 650 mL      PHYSICAL EXAM:  GENERAL: NAD, well-developed, comfortable on nasal canula  HEAD:  Atraumatic, Normocephalic  EYES: EOMI, PERRLA, conjunctiva and sclera clear  NECK: Supple, No JVD  CHEST/LUNG: mild decrease breath sounds bilaterally; No wheeze   HEART: Regular rate and rhythm; No murmurs, rubs, or gallops  ABDOMEN: Soft, Nontender, Nondistended; Bowel sounds present  Neuro: AAOx3, no focal weakness, 5/5 b/l extremity strength  EXTREMITIES:  2+ Peripheral Pulses, No clubbing, cyanosis, trace b/l edema  SKIN: No rashes or lesions

## 2023-01-20 NOTE — CONSULT NOTE ADULT - SUBJECTIVE AND OBJECTIVE BOX
[facility] DIVISION OF INFECTIOUS DISEASES  ANDREW Rodríguez S. Shah, Y. Patel, G. 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 She presented here from [facility] for elevated WBC and low hemoglobin, severe weakness. Pt states for the past 2-3 days has been having increased weakness and lethargy, decreased appetite. +sputum productive cough. + cui, no sob, no difficulty breathing. No abdominal pain, nausea, vomiting, no bloody stools. Has endorsed multiple episodes of loose stools x weeks, currently being treated for c.diff with oral vancomycin.  (18 Jan 2023 21:52)  Patient seen and examined at bedside this morning. Patient states she was having diarrhea for about 1 month intermittently and was worse for the last week, denies taking any antibiotics at home. States she did try marijuana around that time to help her appetite. She reports diarrhea has now resolved, has not had a BM here. She has been on steroids, not on prophylaxis, has not been able to see rheumatologist as she was in rehab. She reports cough for the last 2 months, currently nonproductive. She does not feel short of breath now, denies chest pain. Denies fever, chills, abdominal pain, nausea, vomiting, dysuria. Has L sided port, reports no issues with it.   ROS: 14 point review of systems completed, pertinent positives and negatives as per HPI.    Allergies: codeine (Nausea)  doxycycline (Nausea)  penicillin (Hives)    PMH -- Vasculitis  ANCA-associated vasculitis  Anxiety and depression  Pulmonary embolism  DVT, lower extremity  GERD (gastroesophageal reflux disease)  Hyperlipidemia  Lung mass  DLBCL (diffuse large B cell lymphoma)    PSH -- History of appendectomy  Lung nodule  Non-Hodgkins lymphoma    FH -- No pertinent family history in first degree relatives  FH: lung cancer (Sibling)  FH: CAD (coronary artery disease) (Sibling)    Social History -- denies tobacco, alcohol or illicit drug use    Physical Exam--  Vital Signs Last 24 Hrs  T(F): 99.2 (20 Jan 2023 11:42), Max: 99.7 (20 Jan 2023 05:24)  HR: 74 (20 Jan 2023 11:42) (74 - 100)  BP: 120/63 (20 Jan 2023 11:42) (107/61 - 156/81)  RR: 18 (20 Jan 2023 11:42) (18 - 18)  SpO2: 99% (20 Jan 2023 11:42) (91% - 100%)  General: no acute distress  HEENT: NC/AT, EOMI, anicteric, neck supple  Lungs: decreased breath sounds b/l  Heart: S1, S2 present, RRR. No murmur, rub or gallop.  Abdomen: Soft. Nondistended. Nontender. BS present.   Neuro: AAOx3, no obvious focal deficits   Extremities: No cyanosis or clubbing. Trace edema.   Skin: Warm. Dry. Good turgor. No visible rash.   Psychiatric: Appropriate affect and mood for situation.   Lines: L chest port with no erythema/TTP    Laboratory & Imaging Data--  CBC:                       10.2   46.62 )-----------( 33       ( 20 Jan 2023 07:13 )             30.7     WBC Count: 46.62 K/uL (01-20-23 @ 07:13)  WBC Count: 42.59 K/uL (01-19-23 @ 11:20)  WBC Count: 39.93 K/uL (01-18-23 @ 22:47)  WBC Count: 45.18 K/uL (01-18-23 @ 16:08)    CMP: 01-20    142  |  107  |  14  ----------------------------<  58<L>  4.0   |  23  |  0.67    Ca    8.6      20 Jan 2023 07:14  Phos  2.4     01-19  Mg     2.0     01-19    TPro  5.9<L>  /  Alb  3.1<L>  /  TBili  0.6  /  DBili  x   /  AST  17  /  ALT  6<L>  /  AlkPhos  61  01-18    LIVER FUNCTIONS - ( 18 Jan 2023 16:08 )  Alb: 3.1 g/dL / Pro: 5.9 g/dL / ALK PHOS: 61 U/L / ALT: 6 U/L / AST: 17 U/L / GGT: x             Microbiology: reviewed  Culture - Blood (collected 01-18-23 @ 15:50)  Source: .Blood Blood-Peripheral  Preliminary Report (01-19-23 @ 23:02):    No growth to date.    Culture - Blood (collected 01-18-23 @ 15:30)  Source: .Blood Blood-Peripheral  Preliminary Report (01-19-23 @ 23:02):    No growth to date.    Respiratory Viral Panel with COVID-19 by DIANA (01.18.23 @ 16:08)    Rapid RVP Result: [facility]    SARS-CoV-2: [facility]: This Respiratory Panel uses polymerase chain reaction (PCR) to detect for  adenovirus; coronavirus (HKU1, NL63, 229E, OC43); human metapneumovirus  (hMPV); human enterovirus/rhinovirus (Entero/RV); influenza A; influenza  A/H1; influenza A/H3; influenza A/H1-2009; influenza B; parainfluenza  viruses 1, 2, 3, 4; respiratory syncytial virus; Mycoplasma pneumoniae;  Chlamydophila pneumoniae; and SARS-CoV-2.    Radiology--reviewed  CT Chest Abdomen and Pelvis w/ IV Cont (01.18.23 @ 17:19) >IMPRESSION: No pulmonary arterial embolism. Status post wedge resections in both upper lobes. 2 cm nodular opacity  associated with the staple line in the right upper lobe is noted.  Etiology is unclear. Multiple ill-defined opacities are noted within both lungs, more so in the right lower lobe. Exact etiology is unclear. Bilateral pleural effusions, right more than left.     Xray Chest 1 View- PORTABLE-Urgent (Xray Chest 1 View- PORTABLE-Urgent .) (01.18.23 @ 12:02) >IMPRESSION: Bilateral interstitial opacities which may be related to viral pneumonia  or pulmonary edema. There are improved slightly since the prior study.    Active Medications--  acetaminophen     Tablet .. 650 milliGRAM(s) Oral every 6 hours PRN  aluminum hydroxide/magnesium hydroxide/simethicone Suspension 30 milliLiter(s) Oral every 4 hours PRN  budesonide 160 MICROgram(s)/formoterol 4.5 MICROgram(s) Inhaler 2 Puff(s) Inhalation two times a day  cholecalciferol 2000 Unit(s) Oral daily  citalopram 10 milliGRAM(s) Oral daily  fluticasone propionate 50 MICROgram(s)/spray Nasal Spray 1 Spray(s) Both Nostrils two times a day  folic acid 1 milliGRAM(s) Oral daily  guaiFENesin  milliGRAM(s) Oral every 12 hours  influenza  Vaccine (HIGH DOSE) 0.7 milliLiter(s) IntraMuscular once  ipratropium    for Nebulization 500 MICROGram(s) Nebulizer every 6 hours  melatonin 3 milliGRAM(s) Oral at bedtime PRN  metoprolol tartrate 25 milliGRAM(s) Oral two times a day  mirtazapine 7.5 milliGRAM(s) Oral daily  ondansetron Injectable 4 milliGRAM(s) IV Push every 8 hours PRN  pantoprazole    Tablet 40 milliGRAM(s) Oral before breakfast  polyethylene glycol 3350 17 Gram(s) Oral daily  predniSONE   Tablet 20 milliGRAM(s) Oral daily  senna 1 Tablet(s) Oral daily  sodium bicarbonate 650 milliGRAM(s) Oral three times a day  vancomycin    Solution 125 milliGRAM(s) Oral every 6 hours    Current Antimicrobials:   vancomycin    Solution 125 milliGRAM(s) Oral every 6 hours    Prior/Completed Antimicrobials:  cefepime   IVPB    Immunologic:   influenza  Vaccine (HIGH DOSE) 0.7 milliLiter(s) IntraMuscular once

## 2023-01-20 NOTE — PROGRESS NOTE ADULT - ASSESSMENT
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 She presented here from [facility] for elevated WBC and low hemoglobin, severe weakness. Pt states for the past 2-3 days has been having increased weakness and lethargy, decreased appetite. +sputum productive cough. + cui, no sob, no difficulty breathing. No abdominal pain, nausea, vomiting, no bloody stools. Has endorsed multiple episodes of loose stools x weeks, currently being treated for c.diff with oral vancomycin.       Fatigue/dyspnea: likely due to severe anemia  - pt s/p 1 unit PRBC, then another 1 unit overnight.  - no melena, no hematochezia. no BRBPR. occult stool neg.   - repeat post transfusion hgb stable.  - likely poor appetite with iron deficiency  - no clear signs of infection, will monitor off abx.   - monitor for fluid overload, she tends to require IV Lasix intermittently post transfusion.  - IV Lasix 20 mg x 1 for basilar crackles post transfusion.   - iron studies (though recent transfusion likely affect results), vit b12, D, folic, TSH.   - resume diet. doubt GI bleed   - Physical therapy. Out of bed to chair with assistance.   - Please consult House heme/onc for pancytopenia with elevated WBC. Rheum eval for follow up treatment of vasculitis  (she is chronically on steroids). ID consulted to see if she may need PCP ppx regimen.    Diarrhea  - elevated WBC  - no documented c.diff PCR, re-ordered.  - continuing Vanco PO in the meantime.   - diarrhea completely resolved.  - will anticipate 10 days course empirically vs. dc and monitor off if no clear positive specimen     Pulmonary vasculitis   - Recent TTE on 11/3/22 reviewed: normal LV. outpt card Dr. Ady Cooper  - outpt pulm Mack Tucker   - s/p thoracoscopic biopsy of mediastinal lymph node and Lung wedge resection 11/10/22  - recent pleural effusion s/p 650 cc U/S-guided rt thoracentesis 11/17/22  - exudative but no neutrophilic predominance and initial Gram stain w/o organisms  - cultures neg.   - path results favoring vasculitis: no evidence for lymphoma. Cytology also came back negative.   - comfortable on nasal canula, better post diuretic last admission.   - rheum and heme-onc following previously, will reconsult.   - last admission, she was not started on immunosuppressants such as cellcept given recent treatment for COVID19 at that time  - c/w prednisone 20 mg qdaily. consider rheum consult interms of taper regimen.   - ID consulted for PCP ppx.     hx of Tachycardia    - cont low-dose metoprolol, 50 mg to 25 mg dose reduced in rehab.   - card consult (Dr. Hooker) in the past, if needed    Anxiety and depression  - stable, continue citalopram and Remeron.  - Pt denied SI/HI ideations, denied visual and auditory hallucinations.     GERD (gastroesophageal reflux disease)  - continue PPI    Hyperlipidemia.   - continue home ezetimibe. okay to hold if nonformulary   - Lipid panel    DVT ppx   - holding AC in the setting of anemia, pancytopenia.   - will start Lovenox SQ if no evidence of bleeding and if hgb stable and if plts improves

## 2023-01-20 NOTE — CONSULT NOTE ADULT - ASSESSMENT
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 She presented here from [facility] for elevated WBC and low hemoglobin, severe weakness. Pt states for the past 2-3 days has been having increased weakness and lethargy, decreased appetite. Reports cough, currently nonproductive, no sob, no difficulty breathing. No abdominal pain, nausea, vomiting, no bloody stools. Has endorsed multiple episodes of loose stools x weeks, currently being treated for c.diff with oral vancomycin.     Fatigue/dyspnea likely due to severe anemia s/p transfusions  Diarrhea - recently tried marijuana to help with appetite and noted worsening    - started on empiric po vancomycin -- now with no diarrhea, unable to send sample   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- prednisone 20mg/d  Leukocytosis -- unclear etiology - no clear infectious etiology, ?reactive in setting of above and also on steroids    - RVP/COVID negative, has chronic cough   - L chest port without sign of infection, no s/o SSTI   - overall exam without focal findings, abd benign   - CT imaging reviewed as above - no PE, s/p wedge resections in b/l upper lobes, 2 cm nodular opacity a/w staple line in RUL-unclear etiology; multiple ill-defined opacities in both lungs more in RLL -unclear etiology, b/l effusions R>L   - Prior hx/chart reviewed; cultures were negative at that time    - Bcx here negative to date x2     Recommendations:  Discontinue po vancomycin and monitor off    - if patient develops diarrhea again -- please send stool for C.diff, GI stool PCR and stool culture  Start on Bactrim DS 1 tablet daily while on prednisone   (typically given for PJP ppx in pts on prednisone 20mg/d or higher for >1 month)  Can monitor off abx other than above   Pulmonary, Rheumatology and Heme/Onc following  Monitor temps/CBC    Over the weekend Dr. Deonna Mclean will be covering for our group. If you have any questions, concerns or new micro data, please reach out to them at [phone].    D/w Dr. Hugo Malave M.D.  [facility], Division of Infectious Diseases  [phone]  After 5pm on weekdays and all day on weekends - please call [phone]

## 2023-01-20 NOTE — PROGRESS NOTE ADULT - NSPROGADDITIONALINFOA_GEN_ALL_CORE
d/w pt and MARGRET Wylie.   d/w ID and pulm.    - Dr. MARY Arias ([facility])  - [phone]

## 2023-01-20 NOTE — CHART NOTE - NSCHARTNOTEFT_GEN_A_CORE
82 yr old female with a PMHx of diffuse large B cell lymphoma in remission, non-hodgkin's lymphoma (chemoport), depression, HLD, GERD, PE/DVT (4/2021 no longer on Eliquis), ANCA-vasculitis (20 y/a, no meds), s/p LVATS, LUIS wedge resection (2021), recent direct admit for RVATS, RUL Nodule Biopsy w/ IR marking in [facility] sent in from [facility] rehab for 2-3 days of lethargy and weakness with productive cough, found to have anemia, thrombocytopenia and leukocytosis. Hematology consulted for further work up.     # Hx of DLBCL EBV+  - Follows with Dr Madrigal, s/p R-mini-chop in 2021   - Recent bone marrow biopsy for new anemia in Nov /2022 did not show any disease reccurence.   - CBC at admission with anemia of 5.9; platelet count of 68, wbc of 45.18. Review of records, patient with anemia since at least october 2022, however thrombocytopenia and leukocytosis is new.   - Received pRBC transfusion and responded appropriately   - Platelets are downtrending   - Iron panel consistent AOCD; Ferritin elevated at 5768; B12 elevated, folate>20   - Coags elevated --> Recommend checking Fibrinogen  - CT C/A/P with contrast without signs of LAD however, Multiple ill-defined opacities are noted within both lungs, more so in the right lower lobe. Exact etiology is unclear. B/L pleural effusions R>L  - Recommend reticulocyte count, LDH, procal, RVP, fibrinogen, CBC with Diff daily, urine studies, blood cultures.   - Will review peripheral smear  - Recommend rheumatology, ID consult   - At this time, no clear etiology for the leukocytosis. Per Outpatient oncologist, this has happened previously, may require treatment with rituximab and steroids if remains bicytopenic with leukocytosis. Given the leukocytosis and chronic steroids, would likely recommend coverage with empiric antibiotics until cultures negative.   - Can consider Bone marrow biopsy on monday if counts haven't improved   - Supportive transfusions for plts <10 if afebrile, <15 if febrile, 50 if bleeding, hbg <7     Full consult to be done in AM    Sunita Carson MD   PGY5 heme onc fellow

## 2023-01-20 NOTE — CONSULT NOTE ADULT - SUBJECTIVE AND OBJECTIVE BOX
01-20-23 @ 16:34    Patient is a 82y old  Female who presents with a chief complaint of weakness (20 Jan 2023 11:34)      HPI:  82 yr old female with a PMHx of diffuse large B cell lymphoma in remission, non-hodgkin's lymphoma (chemoport), depression, HLD, GERD, PE/DVT (4/2021 no longer on Eliquis), ANCA-vasculitis (20 y/a, no meds), s/p LVATS, LUIS wedge resection (2021), recent direct admit for RVATS, RUL Nodule Biopsy w/ IR marking in [facility], path favoring vasculitis, treated with Decadron, then switched to PO prednisone, went to [facility]. She presented here from [facility] for elevated WBC and low hemoglobin, severe weakness. Pt states for the past 2-3 days has been having increased weakness and lethargy, decreased appetite. +sputum productive cough. + cui, no sob, no difficulty breathing. No abdominal pain, nausea, vomiting, no bloody stools. Has endorsed multiple episodes of loose stools x weeks, currently being treated for c.diff with oral vancomycin.  (18 Jan 2023 21:52)     she says her breathing is OK: but still feels very weak and feels SOB on exertion:  very anemic on admissions  ?FOLLOWING PRESENT  [ y] Hx of PE/DVT, [x ] Hx COPD, [x ] Hx of Asthma, [ ]y Hx of Hospitalization, [x ]  Hx of BiPAP/CPAP use, [ x] Hx of KATE    Allergies    codeine (Nausea)  doxycycline (Nausea)  penicillin (Hives)    Intolerances        PAST MEDICAL & SURGICAL HISTORY:  ANCA-associated vasculitis      Anxiety and depression      Pulmonary embolism  2021      DVT, lower extremity  2021      GERD (gastroesophageal reflux disease)      Hyperlipidemia      Lung mass  s/p left wedge resection      DLBCL (diffuse large B cell lymphoma)      History of appendectomy      Lung nodule  pt had wedge resection in 2021      Non-Hodgkins lymphoma  chemoport inserted in 2021          FAMILY HISTORY:  FH: lung cancer (Sibling)    FH: CAD (coronary artery disease) (Sibling)        Social History: [  x] TOBACCO                  [x  ] ETOH                                 x[  ] IVDA/DRUGS    REVIEW OF SYSTEMS      General:	weakness    Skin/Breast:x  	  Ophthalmologic:x  	  ENMT:	x    Respiratory and Thorax: cui,  sob  	x  Cardiovascular:	    Gastrointestinal:	x    Genitourinary:	x    Musculoskeletal:	x    Neurological:	x    Psychiatric:	x    Hematology/Lymphatics:	x    Endocrine:	x    Allergic/Immunologic:	x    MEDICATIONS  (STANDING):  budesonide 160 MICROgram(s)/formoterol 4.5 MICROgram(s) Inhaler 2 Puff(s) Inhalation two times a day  cholecalciferol 2000 Unit(s) Oral daily  citalopram 10 milliGRAM(s) Oral daily  fluticasone propionate 50 MICROgram(s)/spray Nasal Spray 1 Spray(s) Both Nostrils two times a day  folic acid 1 milliGRAM(s) Oral daily  guaiFENesin  milliGRAM(s) Oral every 12 hours  influenza  Vaccine (HIGH DOSE) 0.7 milliLiter(s) IntraMuscular once  ipratropium    for Nebulization 500 MICROGram(s) Nebulizer every 6 hours  metoprolol tartrate 25 milliGRAM(s) Oral two times a day  mirtazapine 7.5 milliGRAM(s) Oral daily  pantoprazole    Tablet 40 milliGRAM(s) Oral before breakfast  polyethylene glycol 3350 17 Gram(s) Oral daily  predniSONE   Tablet 20 milliGRAM(s) Oral daily  senna 1 Tablet(s) Oral daily  sodium bicarbonate 650 milliGRAM(s) Oral three times a day  trimethoprim  160 mG/sulfamethoxazole 800 mG 1 Tablet(s) Oral daily    MEDICATIONS  (PRN):  acetaminophen     Tablet .. 650 milliGRAM(s) Oral every 6 hours PRN Temp greater or equal to 38C (100.4F), Mild Pain (1 - 3)  aluminum hydroxide/magnesium hydroxide/simethicone Suspension 30 milliLiter(s) Oral every 4 hours PRN Dyspepsia  melatonin 3 milliGRAM(s) Oral at bedtime PRN Insomnia  ondansetron Injectable 4 milliGRAM(s) IV Push every 8 hours PRN Nausea and/or Vomiting       Vital Signs Last 24 Hrs  T(C): 37.3 (20 Jan 2023 11:42), Max: 37.6 (20 Jan 2023 05:24)  T(F): 99.2 (20 Jan 2023 11:42), Max: 99.7 (20 Jan 2023 05:24)  HR: 74 (20 Jan 2023 11:42) (74 - 100)  BP: 120/63 (20 Jan 2023 11:42) (107/61 - 156/81)  BP(mean): --  RR: 18 (20 Jan 2023 11:42) (18 - 18)  SpO2: 99% (20 Jan 2023 11:42) (91% - 99%)    Parameters below as of 20 Jan 2023 11:42  Patient On (Oxygen Delivery Method): nasal cannula  O2 Flow (L/min): 2  Orthostatic VS          I&O's Summary    19 Jan 2023 07:01  -  20 Jan 2023 07:00  --------------------------------------------------------  IN: 650 mL / OUT: 500 mL / NET: 150 mL        Physical Exam:   GENERAL: NAD, well-groomed, well-developed  HEENT: RANJIT/   Atraumatic, Normocephalic  ENMT: No tonsillar erythema, exudates, or enlargement; Moist mucous membranes, Good dentition, No lesions  NECK: Supple, No JVD, Normal thyroid  CHEST/LUNG: Clear to auscultation bilaterally  CVS: Regular rate and rhythm; No murmurs, rubs, or gallops  GI: : Soft, Nontender, Nondistended; Bowel sounds present  NERVOUS SYSTEM:  Alert & Oriented X3  EXTREMITIES: - edema  LYMPH: No lymphadenopathy noted  SKIN: No rashes or lesions  ENDOCRINOLOGY: No Thyromegaly  PSYCH: Appropriate    Labs:  Venous<37<4>>30<<7.345>>Venous<<3><<4><<5<<309>>, Venous<35<4>>28<<7.375>>Venous<<3><<4><<5<<289>>SARS-CoV-2: NotDetec (18 Jan 2023 16:08)  COVID-19 PCR: Detected (02 Dec 2022 10:09)  SARS-CoV-2: Detected (21 Nov 2022 09:12)  SARS-CoV-2: NotDetec (15 Nov 2022 01:00)  COVID-19 PCR: NotDetec (09 Nov 2022 16:30)                              10.2   46.62 )-----------( 33       ( 20 Jan 2023 07:13 )             30.7                         10.1   42.59 )-----------( 35       ( 19 Jan 2023 11:20 )             29.5                         6.9    39.93 )-----------( 56       ( 18 Jan 2023 22:47 )             20.9                         5.9    45.18 )-----------( 68       ( 18 Jan 2023 16:08 )             18.2     01-20    142  |  107  |  14  ----------------------------<  58<L>  4.0   |  23  |  0.67  01-19    138  |  106  |  15  ----------------------------<  112<H>  4.3   |  18<L>  |  0.57  01-18    136  |  102  |  16  ----------------------------<  149<H>  3.9   |  18<L>  |  0.69    Ca    8.6      20 Jan 2023 07:14  Ca    8.7      19 Jan 2023 11:20  Phos  2.4     01-19  Mg     2.0     01-19    TPro  5.9<L>  /  Alb  3.1<L>  /  TBili  0.6  /  DBili  x   /  AST  17  /  ALT  6<L>  /  AlkPhos  61  01-18    CAPILLARY BLOOD GLUCOSE              Culture - Blood (collected 18 Jan 2023 15:50)  Source: .Blood Blood-Peripheral  Preliminary Report (19 Jan 2023 23:02):    No growth to date.    Culture - Blood (collected 18 Jan 2023 15:30)  Source: .Blood Blood-Peripheral  Preliminary Report (19 Jan 2023 23:02):    No growth to date.      D DImerLactate, Blood: 1.5 mmol/L (01-18 @ 20:32)    Serum Pro-Brain Natriuretic Peptide: 4818 pg/mL (01-18 @ 20:35)      Studies  Chest X-RAY  CT SCAN Chest   CT Abdomen  Venous Dopplers: LE:   Others      r< from: CT Angio Chest PE Protocol w/ IV Cont (01.18.23 @ 17:19) >  PERITONEUM: No ascites.  RETROPERITONEUM/LYMPH NODES: No lymphadenopathy.  ABDOMINAL WALL: Within normal limits.  BONES: Multilevel degenerative changes of the spine and chronic appearing   compression fractures, most severe at the T12 vertebral body level.    IMPRESSION:  No pulmonary arterial embolism.    Status post wedge resections in both upper lobes. 2 cm nodular opacity   associated with the staple line in the right upper lobe is noted.   Etiology is unclear.    Multiple ill-defined opacities are noted within both lungs, more so in   the right lower lobe. Exact etiology is unclear.    Bilateral pleural effusions, right more than left.      --- End of Report ---    < end of copied text >

## 2023-01-20 NOTE — PROGRESS NOTE ADULT - SUBJECTIVE AND OBJECTIVE BOX
SUBJECTIVE/ OVERNIGHT EVENTS:  no further diarrhea  hgb stable  no cp, no sob, no n/v/d. no abdominal pain.  no headache, no dizziness.       --------------------------------------------------------------------------------------------  LABS:                        10.2   46.62 )-----------( 33       ( 20 Jan 2023 07:13 )             30.7     01-20    142  |  107  |  14  ----------------------------<  58<L>  4.0   |  23  |  0.67    Ca    8.6      20 Jan 2023 07:14  Phos  2.4     01-19  Mg     2.0     01-19    TPro  5.9<L>  /  Alb  3.1<L>  /  TBili  0.6  /  DBili  x   /  AST  17  /  ALT  6<L>  /  AlkPhos  61  01-18    PT/INR - ( 18 Jan 2023 16:08 )   PT: 17.0 sec;   INR: 1.46 ratio         PTT - ( 18 Jan 2023 16:08 )  PTT:35.9 sec  CAPILLARY BLOOD GLUCOSE                RADIOLOGY & ADDITIONAL TESTS:    Imaging Personally Reviewed:  [x] YES  [ ] NO    Consultant(s) Notes Reviewed:  [x] YES  [ ] NO    MEDICATIONS  (STANDING):  budesonide 160 MICROgram(s)/formoterol 4.5 MICROgram(s) Inhaler 2 Puff(s) Inhalation two times a day  cholecalciferol 2000 Unit(s) Oral daily  citalopram 10 milliGRAM(s) Oral daily  fluticasone propionate 50 MICROgram(s)/spray Nasal Spray 1 Spray(s) Both Nostrils two times a day  folic acid 1 milliGRAM(s) Oral daily  guaiFENesin  milliGRAM(s) Oral every 12 hours  influenza  Vaccine (HIGH DOSE) 0.7 milliLiter(s) IntraMuscular once  ipratropium    for Nebulization 500 MICROGram(s) Nebulizer every 6 hours  metoprolol tartrate 25 milliGRAM(s) Oral two times a day  mirtazapine 7.5 milliGRAM(s) Oral daily  pantoprazole    Tablet 40 milliGRAM(s) Oral before breakfast  polyethylene glycol 3350 17 Gram(s) Oral daily  predniSONE   Tablet 20 milliGRAM(s) Oral daily  senna 1 Tablet(s) Oral daily  sodium bicarbonate 650 milliGRAM(s) Oral three times a day  vancomycin    Solution 125 milliGRAM(s) Oral every 6 hours    MEDICATIONS  (PRN):  acetaminophen     Tablet .. 650 milliGRAM(s) Oral every 6 hours PRN Temp greater or equal to 38C (100.4F), Mild Pain (1 - 3)  aluminum hydroxide/magnesium hydroxide/simethicone Suspension 30 milliLiter(s) Oral every 4 hours PRN Dyspepsia  melatonin 3 milliGRAM(s) Oral at bedtime PRN Insomnia  ondansetron Injectable 4 milliGRAM(s) IV Push every 8 hours PRN Nausea and/or Vomiting      Care Discussed with Consultants/Other Providers [x] YES  [ ] NO    Vital Signs Last 24 Hrs  T(C): 37.6 (20 Jan 2023 05:24), Max: 37.6 (20 Jan 2023 05:24)  T(F): 99.7 (20 Jan 2023 05:24), Max: 99.7 (20 Jan 2023 05:24)  HR: 100 (20 Jan 2023 05:24) (85 - 100)  BP: 156/81 (20 Jan 2023 05:24) (107/61 - 156/81)  BP(mean): --  RR: 18 (20 Jan 2023 05:24) (18 - 20)  SpO2: 91% (20 Jan 2023 05:24) (91% - 100%)    Parameters below as of 20 Jan 2023 05:24  Patient On (Oxygen Delivery Method): nasal cannula  O2 Flow (L/min): 2    I&O's Summary    19 Jan 2023 07:01  -  20 Jan 2023 07:00  --------------------------------------------------------  IN: 650 mL / OUT: 500 mL / NET: 150 mL        PHYSICAL EXAM:  GENERAL: NAD, well-developed, comfortable on nasal canula  HEAD:  Atraumatic, Normocephalic  EYES: EOMI, PERRLA, conjunctiva and sclera clear  NECK: Supple, No JVD  CHEST/LUNG: mild decrease breath sounds bilaterally; No wheeze   HEART: Regular rate and rhythm; No murmurs, rubs, or gallops  ABDOMEN: Soft, Nontender, Nondistended; Bowel sounds present  Neuro: AAOx3, no focal weakness, 5/5 b/l extremity strength  EXTREMITIES:  2+ Peripheral Pulses, No clubbing, cyanosis, trace b/l edema  SKIN: No rashes or lesions

## 2023-01-21 NOTE — PROGRESS NOTE ADULT - SUBJECTIVE AND OBJECTIVE BOX
SUBJECTIVE/ OVERNIGHT EVENTS:  feels well.  no diarrhea  wbc still elevated  no cp, no sob, no n/v/d. no abdominal pain.  no headache, no dizziness.       --------------------------------------------------------------------------------------------  LABS:                        10.2   40.74 )-----------( 33       ( 21 Jan 2023 07:25 )             30.7     01-21    141  |  104  |  12  ----------------------------<  95  3.4<L>   |  21<L>  |  0.55    Ca    8.5      21 Jan 2023 07:22  Mg     1.9     01-21    TPro  5.8<L>  /  Alb  3.2<L>  /  TBili  0.5  /  DBili  0.2  /  AST  16  /  ALT  5<L>  /  AlkPhos  57  01-21      CAPILLARY BLOOD GLUCOSE                RADIOLOGY & ADDITIONAL TESTS:    Imaging Personally Reviewed:  [x] YES  [ ] NO    Consultant(s) Notes Reviewed:  [x] YES  [ ] NO    MEDICATIONS  (STANDING):  budesonide 160 MICROgram(s)/formoterol 4.5 MICROgram(s) Inhaler 2 Puff(s) Inhalation two times a day  chlorhexidine 2% Cloths 1 Application(s) Topical daily  cholecalciferol 2000 Unit(s) Oral daily  citalopram 10 milliGRAM(s) Oral daily  fluticasone propionate 50 MICROgram(s)/spray Nasal Spray 1 Spray(s) Both Nostrils two times a day  folic acid 1 milliGRAM(s) Oral daily  guaiFENesin  milliGRAM(s) Oral every 12 hours  influenza  Vaccine (HIGH DOSE) 0.7 milliLiter(s) IntraMuscular once  ipratropium    for Nebulization 500 MICROGram(s) Nebulizer every 6 hours  metoprolol tartrate 25 milliGRAM(s) Oral two times a day  mirtazapine 7.5 milliGRAM(s) Oral daily  pantoprazole    Tablet 40 milliGRAM(s) Oral before breakfast  polyethylene glycol 3350 17 Gram(s) Oral daily  predniSONE   Tablet 20 milliGRAM(s) Oral daily  senna 1 Tablet(s) Oral daily  sodium bicarbonate 650 milliGRAM(s) Oral three times a day  trimethoprim  160 mG/sulfamethoxazole 800 mG 1 Tablet(s) Oral daily    MEDICATIONS  (PRN):  acetaminophen     Tablet .. 650 milliGRAM(s) Oral every 6 hours PRN Temp greater or equal to 38C (100.4F), Mild Pain (1 - 3)  aluminum hydroxide/magnesium hydroxide/simethicone Suspension 30 milliLiter(s) Oral every 4 hours PRN Dyspepsia  melatonin 3 milliGRAM(s) Oral at bedtime PRN Insomnia  ondansetron Injectable 4 milliGRAM(s) IV Push every 8 hours PRN Nausea and/or Vomiting      Care Discussed with Consultants/Other Providers [x] YES  [ ] NO    Vital Signs Last 24 Hrs  T(C): 36.4 (21 Jan 2023 20:13), Max: 37.5 (21 Jan 2023 04:53)  T(F): 97.5 (21 Jan 2023 20:13), Max: 99.5 (21 Jan 2023 04:53)  HR: 88 (21 Jan 2023 20:13) (77 - 110)  BP: 144/76 (21 Jan 2023 20:13) (107/58 - 144/76)  BP(mean): --  RR: 18 (21 Jan 2023 20:13) (18 - 18)  SpO2: 98% (21 Jan 2023 20:13) (90% - 98%)    Parameters below as of 21 Jan 2023 20:13  Patient On (Oxygen Delivery Method): nasal cannula      I&O's Summary    21 Jan 2023 07:01  -  21 Jan 2023 20:47  --------------------------------------------------------  IN: 120 mL / OUT: 0 mL / NET: 120 mL          PHYSICAL EXAM:  GENERAL: NAD, well-developed, comfortable on nasal canula  HEAD:  Atraumatic, Normocephalic  EYES: EOMI, PERRLA, conjunctiva and sclera clear  NECK: Supple, No JVD  CHEST/LUNG: mild decrease breath sounds bilaterally; No wheeze   HEART: Regular rate and rhythm; No murmurs, rubs, or gallops  ABDOMEN: Soft, Nontender, Nondistended; Bowel sounds present  Neuro: AAOx3, no focal weakness, 5/5 b/l extremity strength  EXTREMITIES:  2+ Peripheral Pulses, No clubbing, cyanosis, trace b/l edema  SKIN: No rashes or lesions   Explain prior to doing

## 2023-01-21 NOTE — CONSULT NOTE ADULT - ASSESSMENT
82 yr old female with a PMHx of diffuse large B cell lymphoma in remission, non-hodgkin's lymphoma (chemoport), depression, HLD, GERD, PE/DVT (4/2021 no longer on Eliquis), ANCA-vasculitis (20 y/a, no meds), s/p LVATS, LUIS wedge resection (2021), recent direct admit for RVATS, RUL Nodule Biopsy w/ IR marking in [facility] sent in from [facility] rehab for 2-3 days of lethargy and weakness with productive cough, found to have anemia, thrombocytopenia and leukocytosis. Hematology consulted for further work up.     # Hx of DLBCL EBV+  - Follows with Dr Madrigal, s/p R-mini-chop in 2021   - Recent bone marrow biopsy for new anemia in Nov /2022 did not show any disease reccurence.   - CBC at admission with anemia of 5.9; platelet count of 68, wbc of 45.18. Review of records, patient with anemia since at least october 2022, however thrombocytopenia and leukocytosis is new.   - Received pRBC transfusion and responded appropriately, platelets downtrending ~30k  - Iron panel consistent AOCD; Ferritin elevated at 5768; B12 elevated, folate>20   - Coags elevated, fibrinogen pending  - CT C/A/P with contrast without signs of LAD however, Multiple ill-defined opacities are noted within both lungs, more so in the right lower lobe. Exact etiology is unclear. B/L pleural effusions R>L  - reticulocyte count wnl,  elevated from 200s prior, procal mildly elevated .16  - RVP/covid negative, Bcx ngtd, no UA/Ucx available to review  - peripheral smear reviewed: no blasts, but immature WBCs noted, no schistocytes, thrombocytopenia noted  - At this time, no clear etiology for the leukocytosis. Per Outpatient oncologist, this has happened previously, may require treatment with rituximab and steroids if remains bicytopenic with leukocytosis. Given the leukocytosis and chronic steroids, would likely recommend coverage with empiric antibiotics until cultures negative  - sent peripheral flow cytometry w/ cytogenetics and FISH  - Can consider Bone marrow biopsy on monday if counts haven't improved   - Supportive transfusions for plts <10 if afebrile, <15 if febrile, 50 if bleeding, hbg <7   - ID following, recommend rheum consult

## 2023-01-21 NOTE — PROGRESS NOTE ADULT - NSPROGADDITIONALINFOA_GEN_ALL_CORE
d/w pt and NP.  d/w RN.  d/w ID.     - Dr. MARY Arias ([facility] [place])  - [phone]

## 2023-01-21 NOTE — PROGRESS NOTE ADULT - ASSESSMENT
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 She presented here from [facility] for elevated WBC and low hemoglobin, severe weakness. Pt states for the past 2-3 days has been having increased weakness and lethargy, decreased appetite. +sputum productive cough. + cui, no sob, no difficulty breathing. No abdominal pain, nausea, vomiting, no bloody stools. Has endorsed multiple episodes of loose stools x weeks, currently being treated for c.diff with oral vancomycin.       Fatigue/dyspnea: likely due to severe anemia  - pt s/p 1 unit PRBC, then another 1 unit overnight.  - no melena, no hematochezia. no BRBPR. occult stool neg.   - repeat post transfusion hgb stable.  - likely poor appetite with iron deficiency  - no clear signs of infection, will monitor off abx.   - monitor for fluid overload, she tends to require IV Lasix intermittently post transfusion.  - IV Lasix 20 mg x 1 for basilar crackles post transfusion.   - iron studies (though recent transfusion likely affect results), vit b12, D, folic, TSH.   - resume diet. doubt GI bleed   - Physical therapy. Out of bed to chair with assistance.   - Please consult House heme/onc for pancytopenia with elevated WBC. Rheum eval for follow up treatment of vasculitis  (she is chronically on steroids). ID consulted to see if she may need PCP ppx regimen.    Diarrhea  - elevated WBC  - no documented c.diff PCR, re-ordered.  - s/p Vanco PO.   - diarrhea completely resolved.  - monitor off PO vanco per ID    Pulmonary vasculitis   - Recent TTE on 11/3/22 reviewed: normal LV. outpt card Dr. Ady Coopre  - outpt pulm Mack Tucker   - s/p thoracoscopic biopsy of mediastinal lymph node and Lung wedge resection 11/10/22  - recent pleural effusion s/p 650 cc U/S-guided rt thoracentesis 11/17/22  - exudative but no neutrophilic predominance and initial Gram stain w/o organisms  - cultures neg.   - path results favoring vasculitis: no evidence for lymphoma. Cytology also came back negative.   - comfortable on nasal canula, better post diuretic last admission.   - rheum and heme-onc following previously, will reconsult.   - last admission, she was not started on immunosuppressants such as cellcept given recent treatment for COVID19 at that time  - c/w prednisone 20 mg qdaily. consider rheum consult interms of taper regimen.   - ID consulted for PCP ppx.     hx of Tachycardia    - cont low-dose metoprolol, 50 mg to 25 mg dose reduced in rehab.   - card consult (Dr. Hooker) in the past, if needed    Anxiety and depression  - stable, continue citalopram and Remeron.  - Pt denied SI/HI ideations, denied visual and auditory hallucinations.     GERD (gastroesophageal reflux disease)  - continue PPI    Hyperlipidemia.   - continue home ezetimibe. okay to hold if nonformulary   - Lipid panel    DVT ppx   - holding AC in the setting of anemia, pancytopenia.   - will start Lovenox SQ if pancytopenia/ plts recovers.

## 2023-01-21 NOTE — CONSULT NOTE ADULT - SUBJECTIVE AND OBJECTIVE BOX
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 She presented here from [facility] for elevated WBC and low hemoglobin, severe weakness. Pt states for the past 2-3 days has been having increased weakness and lethargy, decreased appetite. +sputum productive cough. + cui, no sob, no difficulty breathing. No abdominal pain, nausea, vomiting, no bloody stools. Has endorsed multiple episodes of loose stools x weeks, currently being treated for c.diff with oral vancomycin.  (18 Jan 2023 21:52)      Allergies    codeine (Nausea)  doxycycline (Nausea)  penicillin (Hives)    Intolerances        MEDICATIONS  (STANDING):  budesonide 160 MICROgram(s)/formoterol 4.5 MICROgram(s) Inhaler 2 Puff(s) Inhalation two times a day  chlorhexidine 2% Cloths 1 Application(s) Topical daily  cholecalciferol 2000 Unit(s) Oral daily  citalopram 10 milliGRAM(s) Oral daily  fluticasone propionate 50 MICROgram(s)/spray Nasal Spray 1 Spray(s) Both Nostrils two times a day  folic acid 1 milliGRAM(s) Oral daily  guaiFENesin  milliGRAM(s) Oral every 12 hours  influenza  Vaccine (HIGH DOSE) 0.7 milliLiter(s) IntraMuscular once  ipratropium    for Nebulization 500 MICROGram(s) Nebulizer every 6 hours  metoprolol tartrate 25 milliGRAM(s) Oral two times a day  mirtazapine 7.5 milliGRAM(s) Oral daily  pantoprazole    Tablet 40 milliGRAM(s) Oral before breakfast  polyethylene glycol 3350 17 Gram(s) Oral daily  predniSONE   Tablet 20 milliGRAM(s) Oral daily  senna 1 Tablet(s) Oral daily  sodium bicarbonate 650 milliGRAM(s) Oral three times a day  trimethoprim  160 mG/sulfamethoxazole 800 mG 1 Tablet(s) Oral daily    MEDICATIONS  (PRN):  acetaminophen     Tablet .. 650 milliGRAM(s) Oral every 6 hours PRN Temp greater or equal to 38C (100.4F), Mild Pain (1 - 3)  aluminum hydroxide/magnesium hydroxide/simethicone Suspension 30 milliLiter(s) Oral every 4 hours PRN Dyspepsia  melatonin 3 milliGRAM(s) Oral at bedtime PRN Insomnia  ondansetron Injectable 4 milliGRAM(s) IV Push every 8 hours PRN Nausea and/or Vomiting      PAST MEDICAL & SURGICAL HISTORY:  ANCA-associated vasculitis      Anxiety and depression      Pulmonary embolism  2021      DVT, lower extremity  2021      GERD (gastroesophageal reflux disease)      Hyperlipidemia      Lung mass  s/p left wedge resection      DLBCL (diffuse large B cell lymphoma)      History of appendectomy      Lung nodule  pt had wedge resection in 2021      Non-Hodgkins lymphoma  chemoport inserted in 2021          FAMILY HISTORY:  FH: lung cancer (Sibling)    FH: CAD (coronary artery disease) (Sibling)        SOCIAL HISTORY: No EtOH, no tobacco    REVIEW OF SYSTEMS:    CONSTITUTIONAL: No weakness, fevers or chills  EYES/ENT: No visual changes;  No vertigo or throat pain   NECK: No pain or stiffness  RESPIRATORY: No cough, wheezing, hemoptysis; No shortness of breath  CARDIOVASCULAR: No chest pain or palpitations  GASTROINTESTINAL: No abdominal or epigastric pain. No nausea, vomiting, or hematemesis; No diarrhea or constipation. No melena or hematochezia.  GENITOURINARY: No dysuria, frequency or hematuria  NEUROLOGICAL: No numbness or weakness  SKIN: No itching, burning, rashes, or lesions   All other review of systems is negative unless indicated above.        T(F): 99.5 (01-21-23 @ 04:53), Max: 99.5 (01-21-23 @ 04:53)  HR: 110 (01-21-23 @ 04:53)  BP: 138/76 (01-21-23 @ 04:53)  RR: 18 (01-21-23 @ 04:53)  SpO2: 93% (01-21-23 @ 04:53)  Wt(kg): --    Constitutional: NAD  Eyes: EOMI, sclera non-icteric  Neck: supple  Respiratory: no inc wob  Cardiovascular: RRR,   Gastrointestinal: soft, NTND, no masses palpable,  Extremities: no cyanosis, no clubbing                          10.2   40.74 )-----------( 33       ( 21 Jan 2023 07:25 )             30.7       01-21    141  |  104  |  12  ----------------------------<  95  3.4<L>   |  21<L>  |  0.55    Ca    8.5      21 Jan 2023 07:22  Phos  2.4     01-19  Mg     1.9     01-21    TPro  5.8<L>  /  Alb  3.2<L>  /  TBili  0.5  /  DBili  0.2  /  AST  16  /  ALT  5<L>  /  AlkPhos  57  01-21      Lactate Dehydrogenase, Serum: 890 U/L (01-21 @ 07:22)  Magnesium, Serum: 1.9 mg/dL (01-21 @ 07:22)      RADIOLOGY & ADDITIONAL TESTS:  Studies reviewed.

## 2023-01-22 NOTE — PROGRESS NOTE ADULT - ASSESSMENT
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 She presented here from [facility] for elevated WBC and low hemoglobin, severe weakness. Pt states for the past 2-3 days has been having increased weakness and lethargy, decreased appetite. +sputum productive cough. + cui, no sob, no difficulty breathing. No abdominal pain, nausea, vomiting, no bloody stools. Has endorsed multiple episodes of loose stools x weeks, currently being treated for c.diff with oral vancomycin.       Fatigue/dyspnea: likely due to severe anemia :  pt s/p 1 unit PRB  Diarrhea  Pulmonary vasculitis  :  hx of Tachycardia :  Recent TTE on 11/3/22 reviewed: normal LV. outpt card Dr. Ady Cooper  Anxiety and depression  GERD (gastroesophageal reflux disease)  Hyperlipidemia.   DVT ppx     1/20:  Fatigue/dyspnea: likely due to severe anemia :  pt s/p 1 unit PRBC: hb now  > 10  : she is on 2 L of oxygen : no wheezing: no overt signs of bleeding : ct chest is abnormal report noted:  has jean-claude ill defined opacities of unclear etiology  : venous ph is mild acidotic:  no need for Bipap at this time : monitor and trend hb  Diarrhea : symptomatic rx:  workup per primary team  Pulmonary vasculitis  : per rheumatology  : had recent vats surgery : LN biopsy noted:   hx of Tachycardia :  Recent TTE on 11/3/22 reviewed: normal LV. outpt card Dr. Ady Cooper  Anxiety and depression  GERD (gastroesophageal reflux disease) : ppi   Hyperlipidemia.   recent covid  : o n last admission she was treated for covid infection:   DVT ppx   d wteam    1/22:    Fatigue/dyspnea: likely due to severe anemia :  pt s/p 1 unit PRBC: hb now  > 10  : she is on 2 L of oxygen : no wheezing: no overt signs of bleeding : ct chest is abnormal report noted:  has jean-claude ill defined opacities of unclear etiology  : venous ph is mild acidotic:  no need for Bipap at this time : monitor and trend hb: she remained stable o gracie last 1 days:  she is not on any antibtiocs at this time: RVP  and blood cultures are negative: she had ebus biopsy also before: reviewed: ID following  Diarrhea : symptomatic rx:  workup per primary team : seems to have resolved  Pulmonary vasculitis  : per rheumatology  : had recent vats surgery : LN biopsy noted:   Bicytopneia and leucocytosis: ? etiology  : for possible bone marrow biopsy on Monday    hx of Tachycardia :  Recent TTE on 11/3/22 reviewed: normal LV. outpt card Dr. Ady Cooper  Anxiety and depression  GERD (gastroesophageal reflux disease) : ppi   Hyperlipidemia.   recent covid  : on last admission she was treated for covid infection:   DVT ppx   dw team

## 2023-01-22 NOTE — PROGRESS NOTE ADULT - SUBJECTIVE AND OBJECTIVE BOX
SUBJECTIVE/ OVERNIGHT EVENTS:  constipated now  no BM past 4-5 days  on senna and miralax  no cp, no sob, no n/v/d. no abdominal pain.  no headache, no dizziness.   wbc still elevated.        --------------------------------------------------------------------------------------------  LABS:                        9.6    44.17 )-----------( 34       ( 22 Jan 2023 07:13 )             29.7     01-22    137  |  102  |  12  ----------------------------<  64<L>  4.0   |  20<L>  |  0.55    Ca    8.5      22 Jan 2023 07:11  Mg     1.9     01-21    TPro  6.5  /  Alb  3.3  /  TBili  0.6  /  DBili  x   /  AST  20  /  ALT  5<L>  /  AlkPhos  63  01-22      CAPILLARY BLOOD GLUCOSE                RADIOLOGY & ADDITIONAL TESTS:    Imaging Personally Reviewed:  [x] YES  [ ] NO    Consultant(s) Notes Reviewed:  [x] YES  [ ] NO    MEDICATIONS  (STANDING):  budesonide 160 MICROgram(s)/formoterol 4.5 MICROgram(s) Inhaler 2 Puff(s) Inhalation two times a day  chlorhexidine 2% Cloths 1 Application(s) Topical daily  cholecalciferol 2000 Unit(s) Oral daily  citalopram 10 milliGRAM(s) Oral daily  fluticasone propionate 50 MICROgram(s)/spray Nasal Spray 1 Spray(s) Both Nostrils two times a day  folic acid 1 milliGRAM(s) Oral daily  guaiFENesin  milliGRAM(s) Oral every 12 hours  influenza  Vaccine (HIGH DOSE) 0.7 milliLiter(s) IntraMuscular once  ipratropium    for Nebulization 500 MICROGram(s) Nebulizer every 6 hours  metoprolol tartrate 25 milliGRAM(s) Oral two times a day  mirtazapine 7.5 milliGRAM(s) Oral daily  pantoprazole    Tablet 40 milliGRAM(s) Oral before breakfast  polyethylene glycol 3350 17 Gram(s) Oral daily  predniSONE   Tablet 20 milliGRAM(s) Oral daily  senna 1 Tablet(s) Oral daily  sodium bicarbonate 650 milliGRAM(s) Oral three times a day  trimethoprim  160 mG/sulfamethoxazole 800 mG 1 Tablet(s) Oral daily    MEDICATIONS  (PRN):  acetaminophen     Tablet .. 650 milliGRAM(s) Oral every 6 hours PRN Temp greater or equal to 38C (100.4F), Mild Pain (1 - 3)  aluminum hydroxide/magnesium hydroxide/simethicone Suspension 30 milliLiter(s) Oral every 4 hours PRN Dyspepsia  melatonin 3 milliGRAM(s) Oral at bedtime PRN Insomnia  ondansetron Injectable 4 milliGRAM(s) IV Push every 8 hours PRN Nausea and/or Vomiting      Care Discussed with Consultants/Other Providers [x] YES  [ ] NO    Vital Signs Last 24 Hrs  T(C): 37 (22 Jan 2023 05:06), Max: 37 (22 Jan 2023 05:06)  T(F): 98.6 (22 Jan 2023 05:06), Max: 98.6 (22 Jan 2023 05:06)  HR: 104 (22 Jan 2023 05:06) (87 - 104)  BP: 136/78 (22 Jan 2023 05:06) (107/58 - 144/76)  BP(mean): --  RR: 18 (22 Jan 2023 05:06) (18 - 18)  SpO2: 92% (22 Jan 2023 05:06) (90% - 98%)    Parameters below as of 22 Jan 2023 05:06  Patient On (Oxygen Delivery Method): nasal cannula      I&O's Summary    21 Jan 2023 07:01  -  22 Jan 2023 07:00  --------------------------------------------------------  IN: 120 mL / OUT: 250 mL / NET: -130 mL    22 Jan 2023 07:01  -  22 Jan 2023 10:18  --------------------------------------------------------  IN: 240 mL / OUT: 0 mL / NET: 240 mL      PHYSICAL EXAM:  GENERAL: NAD, well-developed, comfortable on nasal canula  HEAD:  Atraumatic, Normocephalic  EYES: EOMI, PERRLA, conjunctiva and sclera clear  NECK: Supple, No JVD  CHEST/LUNG: mild decrease breath sounds bilaterally; No wheeze   HEART: Regular rate and rhythm; No murmurs, rubs, or gallops  ABDOMEN: Soft, Nontender, Nondistended; Bowel sounds present  Neuro: AAOx3, no focal weakness, 5/5 b/l extremity strength  EXTREMITIES:  2+ Peripheral Pulses, No clubbing, cyanosis, trace b/l edema  SKIN: No rashes or lesions

## 2023-01-22 NOTE — PROGRESS NOTE ADULT - SUBJECTIVE AND OBJECTIVE BOX
Date of Service: 01-22-23 @ 13:33    Patient is a 82y old  Female who presents with a chief complaint of weakness (22 Jan 2023 10:17)      Any change in ROS: on 3 L : alert and awake and not in any resp idstress    MEDICATIONS  (STANDING):  budesonide 160 MICROgram(s)/formoterol 4.5 MICROgram(s) Inhaler 2 Puff(s) Inhalation two times a day  chlorhexidine 2% Cloths 1 Application(s) Topical daily  cholecalciferol 2000 Unit(s) Oral daily  citalopram 10 milliGRAM(s) Oral daily  fluticasone propionate 50 MICROgram(s)/spray Nasal Spray 1 Spray(s) Both Nostrils two times a day  folic acid 1 milliGRAM(s) Oral daily  guaiFENesin  milliGRAM(s) Oral every 12 hours  influenza  Vaccine (HIGH DOSE) 0.7 milliLiter(s) IntraMuscular once  ipratropium    for Nebulization 500 MICROGram(s) Nebulizer every 6 hours  metoprolol tartrate 25 milliGRAM(s) Oral two times a day  mirtazapine 7.5 milliGRAM(s) Oral daily  pantoprazole    Tablet 40 milliGRAM(s) Oral before breakfast  polyethylene glycol 3350 17 Gram(s) Oral daily  predniSONE   Tablet 20 milliGRAM(s) Oral daily  senna 1 Tablet(s) Oral daily  sodium bicarbonate 650 milliGRAM(s) Oral three times a day  trimethoprim  160 mG/sulfamethoxazole 800 mG 1 Tablet(s) Oral daily    MEDICATIONS  (PRN):  acetaminophen     Tablet .. 650 milliGRAM(s) Oral every 6 hours PRN Temp greater or equal to 38C (100.4F), Mild Pain (1 - 3)  aluminum hydroxide/magnesium hydroxide/simethicone Suspension 30 milliLiter(s) Oral every 4 hours PRN Dyspepsia  melatonin 3 milliGRAM(s) Oral at bedtime PRN Insomnia  ondansetron Injectable 4 milliGRAM(s) IV Push every 8 hours PRN Nausea and/or Vomiting    Vital Signs Last 24 Hrs  T(C): 36.9 (22 Jan 2023 11:40), Max: 37 (22 Jan 2023 05:06)  T(F): 98.4 (22 Jan 2023 11:40), Max: 98.6 (22 Jan 2023 05:06)  HR: 102 (22 Jan 2023 11:40) (87 - 104)  BP: 121/68 (22 Jan 2023 11:40) (114/64 - 144/76)  BP(mean): --  RR: 18 (22 Jan 2023 11:40) (18 - 18)  SpO2: 90% (22 Jan 2023 11:40) (90% - 98%)    Parameters below as of 22 Jan 2023 11:40  Patient On (Oxygen Delivery Method): nasal cannula  O2 Flow (L/min): 2      I&O's Summary    21 Jan 2023 07:01  -  22 Jan 2023 07:00  --------------------------------------------------------  IN: 120 mL / OUT: 250 mL / NET: -130 mL    22 Jan 2023 07:01  -  22 Jan 2023 13:33  --------------------------------------------------------  IN: 240 mL / OUT: 0 mL / NET: 240 mL          Physical Exam:   GENERAL: NAD, well-groomed, well-developed  HEENT: RANJIT/   Atraumatic, Normocephalic  ENMT: No tonsillar erythema, exudates, or enlargement; Moist mucous membranes, Good dentition, No lesions  NECK: Supple, No JVD, Normal thyroid  CHEST/LUNG: Clear to auscultaion- no wheezing  CVS: Regular rate and rhythm; No murmurs, rubs, or gallops  GI: : Soft, Nontender, Nondistended; Bowel sounds present  NERVOUS SYSTEM:  Alert & Oriented X3  EXTREMITIES:  -edema  LYMPH: No lymphadenopathy noted  SKIN: No rashes or lesions  ENDOCRINOLOGY: No Thyromegaly  PSYCH: Appropriate    Labs:  20, 20                            9.6    44.17 )-----------( 34       ( 22 Jan 2023 07:13 )             29.7                         10.2   40.74 )-----------( 33       ( 21 Jan 2023 07:25 )             30.7                         10.2   46.62 )-----------( 33       ( 20 Jan 2023 07:13 )             30.7                         10.1   42.59 )-----------( 35       ( 19 Jan 2023 11:20 )             29.5                         6.9    39.93 )-----------( 56       ( 18 Jan 2023 22:47 )             20.9                         5.9    45.18 )-----------( 68       ( 18 Jan 2023 16:08 )             18.2     01-22    137  |  102  |  12  ----------------------------<  64<L>  4.0   |  20<L>  |  0.55  01-21    141  |  104  |  12  ----------------------------<  95  3.4<L>   |  21<L>  |  0.55  01-20    142  |  107  |  14  ----------------------------<  58<L>  4.0   |  23  |  0.67  01-19    138  |  106  |  15  ----------------------------<  112<H>  4.3   |  18<L>  |  0.57  01-18    136  |  102  |  16  ----------------------------<  149<H>  3.9   |  18<L>  |  0.69    Ca    8.5      22 Jan 2023 07:11  Ca    8.5      21 Jan 2023 07:22  Mg     1.9     01-21    TPro  6.5  /  Alb  3.3  /  TBili  0.6  /  DBili  x   /  AST  20  /  ALT  5<L>  /  AlkPhos  63  01-22  TPro  5.8<L>  /  Alb  3.2<L>  /  TBili  0.5  /  DBili  0.2  /  AST  16  /  ALT  5<L>  /  AlkPhos  57  01-21  TPro  5.9<L>  /  Alb  3.1<L>  /  TBili  0.6  /  DBili  x   /  AST  17  /  ALT  6<L>  /  AlkPhos  61  01-18    CAPILLARY BLOOD GLUCOSE          LIVER FUNCTIONS - ( 22 Jan 2023 07:11 )  Alb: 3.3 g/dL / Pro: 6.5 g/dL / ALK PHOS: 63 U/L / ALT: 5 U/L / AST: 20 U/L / GGT: x               Procalcitonin, Serum: 0.16 ng/mL (01-21 @ 07:22)  Lactate, Blood: 1.5 mmol/L (01-18 @ 20:32)        RECENT CULTURES:  01-18 @ 15:50 .Blood Blood-Peripheral                No growth to date.    01-18 @ 15:30 .Blood Blood-Peripheral              rad< from: CT Abdomen and Pelvis w/ IV Cont (01.18.23 @ 17:19) >    IMPRESSION:  No pulmonary arterial embolism.    Status post wedge resections in both upper lobes. 2 cm nodular opacity   associated with the staple line in the right upper lobe is noted.   Etiology is unclear.    Multiple ill-defined opacities are noted within both lungs, more so in   the right lower lobe. Exact etiology is unclear.    Bilateral pleural effusions, right more than left.    < end of copied text >    No growth to date.          RESPIRATORY CULTURES:          Studies  Chest X-RAY  CT SCAN Chest   Venous Dopplers: LE:   CT Abdomen  Others

## 2023-01-22 NOTE — CONSULT NOTE ADULT - SUBJECTIVE AND OBJECTIVE BOX
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 Patient was sent to Rehab. Subsequently on 1/18 patient is readmitted for productive cough and general weakness. Rheumatology was consulted for further help in management.     On admission, patient required 3 L of ncO2. Labs were remarkable for WBC 45.18, h/h 5.9/18.2, platelet 68. Patient received 1 U PRBC at the ED. Patient had hx of C.diff diarrhea and was on vancomycin.     Hospitalization summary:  Patient was started on .CTA showed multiple ill-defined opacities are noted within both lungs, more so in the right lower lobe. Exact etiology is unclear. Bilateral pleural effusions, right more than left. ID reviewed the case and stated that there is no active sign of infection and antibiotics were held. Platelet was kept dropping and Hem/Onc was consulted and no sign of active hemolysis (peripheral smear no schistocyte). Plan for bone marrow bx tentatively on 1/23/23.     [facility] admission 11-12/2022   Previous admission for SOB due to possible COVID19. Rheumatologist was consulted w/new mass in the lung w/hx of AAV. Patient was DC 12/2/22 s/p Decadron 6 mg qdaily IV changed to PO. After completion of 10 days course, can be switched to Prednisone 20 mg per rheum.      Consult day:   1/22/23  Patient stated that she was feeling more weak and SOB. She stated that after receiving 1 PRBC, she started feeling well. She was having diarrhea over the rehab which now is more form stools.    ROS:  Positive: SOB, dry cough, rash legs and arms (during heparin subq in rehab), weakness  Negative: hemoptysis, epistaxis, gum bleeding, melena, hematochezia, numbness, tingling, vision change, hematuria, foamy urine, fever, chills       #Diffuse large B cell lymphoma:   Dx 4/2021  -CT angio done which showed right lower lobe and middle lobe pulmonary arterial emboli and b/l large  mass of 8.6 cm  -S/P biopsy by pulmonology on 4/16/21 showed diffuse large B cell lymphoma   -Treatment: R-CHOP on 5/20/21     #MPO AAV vasculitis  Dx 2013  Rheumatologist Dr. Kasandra Plaza  Clinical presentation: neuropathy, sinusitis, kidney   CT chest Increased mediastinal lymphadenopathy. A reference low right paratracheal node measures 2.4 x 1.8 cm (2, 39), interval increase in size from prior when it measured 2.0 x 1.2 cm. Moderate-sized hiatal hernia.  11/10/22 R wedge resection showed capillaritis with fibrin, giant cells and inflammation around small and intermediate sized vessels. Elastic stain showed vasculocentricity of the inflammation. Lymph node showed:  Focal giant cells and focal necrosis are seen in the lymph node as well   Previous treatment: RTX 2013 was on remission and transitioned to AZA since 2014  Negative ANCA, MPO, PR3. UA w/o blood, does have protein creatinine ratio of 0.6 and 0.7  Negative hepatitis and QuantiFeron 11/2022. CD19%=5               PAST MEDICAL & SURGICAL HISTORY:  ANCA-associated vasculitis      Anxiety and depression      Pulmonary embolism  2021      DVT, lower extremity  2021      GERD (gastroesophageal reflux disease)      Hyperlipidemia      Lung mass  s/p left wedge resection      DLBCL (diffuse large B cell lymphoma)      History of appendectomy      Lung nodule  pt had wedge resection in 2021      Non-Hodgkins lymphoma  chemoport inserted in 2021          Review of Systems:  See H&P    MEDICATIONS  (STANDING):  budesonide 160 MICROgram(s)/formoterol 4.5 MICROgram(s) Inhaler 2 Puff(s) Inhalation two times a day  chlorhexidine 2% Cloths 1 Application(s) Topical daily  cholecalciferol 2000 Unit(s) Oral daily  citalopram 10 milliGRAM(s) Oral daily  fluticasone propionate 50 MICROgram(s)/spray Nasal Spray 1 Spray(s) Both Nostrils two times a day  folic acid 1 milliGRAM(s) Oral daily  guaiFENesin  milliGRAM(s) Oral every 12 hours  influenza  Vaccine (HIGH DOSE) 0.7 milliLiter(s) IntraMuscular once  ipratropium    for Nebulization 500 MICROGram(s) Nebulizer every 6 hours  metoprolol tartrate 25 milliGRAM(s) Oral two times a day  mirtazapine 7.5 milliGRAM(s) Oral daily  pantoprazole    Tablet 40 milliGRAM(s) Oral before breakfast  polyethylene glycol 3350 17 Gram(s) Oral daily  predniSONE   Tablet 20 milliGRAM(s) Oral daily  senna 1 Tablet(s) Oral daily  sodium bicarbonate 650 milliGRAM(s) Oral three times a day  trimethoprim  160 mG/sulfamethoxazole 800 mG 1 Tablet(s) Oral daily    MEDICATIONS  (PRN):  acetaminophen     Tablet .. 650 milliGRAM(s) Oral every 6 hours PRN Temp greater or equal to 38C (100.4F), Mild Pain (1 - 3)  aluminum hydroxide/magnesium hydroxide/simethicone Suspension 30 milliLiter(s) Oral every 4 hours PRN Dyspepsia  melatonin 3 milliGRAM(s) Oral at bedtime PRN Insomnia  ondansetron Injectable 4 milliGRAM(s) IV Push every 8 hours PRN Nausea and/or Vomiting      Allergies    codeine (Nausea)  doxycycline (Nausea)  penicillin (Hives)    Intolerances        PERTINENT MEDICATION HISTORY:    SOCIAL HISTORY:  OCCUPATION:  TRAVEL HISTORY:    FAMILY HISTORY:  FH: lung cancer (Sibling)    FH: CAD (coronary artery disease) (Sibling)        Vital Signs Last 24 Hrs  T(C): 36.9 (22 Jan 2023 11:40), Max: 37 (22 Jan 2023 05:06)  T(F): 98.4 (22 Jan 2023 11:40), Max: 98.6 (22 Jan 2023 05:06)  HR: 102 (22 Jan 2023 11:40) (87 - 104)  BP: 121/68 (22 Jan 2023 11:40) (114/64 - 144/76)  BP(mean): --  RR: 18 (22 Jan 2023 11:40) (18 - 18)  SpO2: 90% (22 Jan 2023 11:40) (90% - 98%)    Parameters below as of 22 Jan 2023 11:40  Patient On (Oxygen Delivery Method): nasal cannula  O2 Flow (L/min): 2      Physical Exam:  General: No apparent distress  HEENT: EOMI, MMM  CVS: +S1/S2, RRR, no murmurs/rubs/gallops, Chemo-port  Resp: On 3L of ncO2. Hypoventilation in the L>R  GI: Soft, NT/ND +BS  MSK:   MSK: no synovitis, joints NTP    tongue: midline, no deviation   neck flexion/extension: 5/5   deltoid: 5/5 B/L  biceps/triceps: 5/5 B/L  Hand/: 5/5 B/L  Hip flexion/extension: 5/5 B/L  knee flexion/extension: 5/5 B/L    dorsiflexion: 3/5 L, 5/5 B/L  plantar flex: 5/5 B/L  Neuro: AAOx3  Skin: non blanching multiple purpuric lesions in the lower extremities, arms, and chest     LABS:                        9.6    44.17 )-----------( 34       ( 22 Jan 2023 07:13 )             29.7     01-22    137  |  102  |  12  ----------------------------<  64<L>  4.0   |  20<L>  |  0.55    Ca    8.5      22 Jan 2023 07:11  Mg     1.9     01-21    TPro  6.5  /  Alb  3.3  /  TBili  0.6  /  DBili  x   /  AST  20  /  ALT  5<L>  /  AlkPhos  63  01-22      Reticulocyte Percent: 0.5 % (01.21.23 @ 07:25) Procalcitonin, Serum: 0.16: This assay is intended for use to determine the change of PCT over time Lactate Dehydrogenase, Serum: 890 U/L (01.21.23 @ 07:22) Folate, Serum: >20.0 ng/mL (01.19.23 @ 11:19) Vitamin B12, Serum: >2000 pg/mL (01.19.23 @ 11:19)         RADIOLOGY & ADDITIONAL STUDIES:  `< from: CT Abdomen and Pelvis w/ IV Cont (01.18.23 @ 17:19) >  No pulmonary arterial embolism.    Status post wedge resections in both upper lobes. 2 cm nodular opacity   associated with the staple line in the right upper lobe is noted.   Etiology is unclear.    Multiple ill-defined opacities are noted within both lungs, more so in   the right lower lobe. Exact etiology is unclear.    < end of copied text >  < from: CT Abdomen and Pelvis w/ IV Cont (01.18.23 @ 17:19) >  LUNGS AND LARGE AIRWAYS: Patent central airways. Bilateral upper lobe   wedge resection. 2 cm nodular opacity associated with the staple line is   noted in the right upper lobe. 1.3 cm nodule containing punctate   calcification in the left upper lobe is unchanged when compared to   previous exam. New ill-defined opacities are noted within both lungs,   more so in the right lower lobe. Stable right lower lobe cystic change.  PLEURA: Moderate partially loculated right pleural effusion with adjacent   passive atelectasis. Small left pleural effusion. Previously seen   pneumothorax is not present on the current exam.    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 Patient was sent to Rehab. Subsequently on 1/18 patient is readmitted for productive cough and general weakness. Rheumatology was consulted for further help in management.     On admission, patient required 3 L of ncO2. Labs were remarkable for WBC 45.18, h/h 5.9/18.2, platelet 68. Patient received 1 U PRBC at the ED. Patient had hx of C.diff diarrhea and was on vancomycin.     Hospitalization summary:  Patient was started on .CTA showed multiple ill-defined opacities are noted within both lungs, more so in the right lower lobe. Exact etiology is unclear. Bilateral pleural effusions, right more than left. ID reviewed the case and stated that there is no active sign of infection and antibiotics were held. Platelet was kept dropping and Hem/Onc was consulted and no sign of active hemolysis (peripheral smear no schistocyte). Plan for bone marrow bx tentatively on 1/23/23.     [facility] admission 11-12/2022   Previous admission for SOB due to possible COVID19. Rheumatologist was consulted w/new mass in the lung w/hx of AAV. Patient was DC 12/2/22 s/p Decadron 6 mg qdaily IV changed to PO. After completion of 10 days course, can be switched to Prednisone 20 mg per rheum.      Consult day:   1/22/23  Patient stated that she was feeling more weak and SOB. She stated that after receiving 1 PRBC, she started feeling well. She was having diarrhea over the rehab which now is more form stools.    ROS:  Positive: SOB, dry cough, rash legs and arms (during heparin subq in rehab), weakness  Negative: hemoptysis, epistaxis, gum bleeding, melena, hematochezia, numbness, tingling, vision change, hematuria, foamy urine, fever, chills       #Diffuse large B cell lymphoma:   Dx 4/2021  -CT angio done which showed right lower lobe and middle lobe pulmonary arterial emboli and b/l large  mass of 8.6 cm  -S/P biopsy by pulmonology on 4/16/21 showed diffuse large B cell lymphoma   -Treatment: R-CHOP on 5/20/21     #MPO AAV vasculitis  Dx 2013  Rheumatologist Dr. Kasandra Plaza  Clinical presentation: neuropathy, sinusitis, kidney   CT chest Increased mediastinal lymphadenopathy. A reference low right paratracheal node measures 2.4 x 1.8 cm (2, 39), interval increase in size from prior when it measured 2.0 x 1.2 cm. Moderate-sized hiatal hernia.  11/10/22 R wedge resection showed capillaritis with fibrin, giant cells and inflammation around small and intermediate sized vessels. Elastic stain showed vasculocentricity of the inflammation. Lymph node showed:  Focal giant cells and focal necrosis are seen in the lymph node as well   Previous treatment: RTX 2013 was on remission and transitioned to AZA since 2014  Negative ANCA, MPO, PR3. UA w/o blood, does have protein creatinine ratio of 0.6 and 0.7  Negative hepatitis and QuantiFeron 11/2022. CD19%=5               PAST MEDICAL & SURGICAL HISTORY:  ANCA-associated vasculitis      Anxiety and depression      Pulmonary embolism  2021      DVT, lower extremity  2021      GERD (gastroesophageal reflux disease)      Hyperlipidemia      Lung mass  s/p left wedge resection      DLBCL (diffuse large B cell lymphoma)      History of appendectomy      Lung nodule  pt had wedge resection in 2021      Non-Hodgkins lymphoma  chemoport inserted in 2021          Review of Systems:  See H&P    MEDICATIONS  (STANDING):  budesonide 160 MICROgram(s)/formoterol 4.5 MICROgram(s) Inhaler 2 Puff(s) Inhalation two times a day  chlorhexidine 2% Cloths 1 Application(s) Topical daily  cholecalciferol 2000 Unit(s) Oral daily  citalopram 10 milliGRAM(s) Oral daily  fluticasone propionate 50 MICROgram(s)/spray Nasal Spray 1 Spray(s) Both Nostrils two times a day  folic acid 1 milliGRAM(s) Oral daily  guaiFENesin  milliGRAM(s) Oral every 12 hours  influenza  Vaccine (HIGH DOSE) 0.7 milliLiter(s) IntraMuscular once  ipratropium    for Nebulization 500 MICROGram(s) Nebulizer every 6 hours  metoprolol tartrate 25 milliGRAM(s) Oral two times a day  mirtazapine 7.5 milliGRAM(s) Oral daily  pantoprazole    Tablet 40 milliGRAM(s) Oral before breakfast  polyethylene glycol 3350 17 Gram(s) Oral daily  predniSONE   Tablet 20 milliGRAM(s) Oral daily  senna 1 Tablet(s) Oral daily  sodium bicarbonate 650 milliGRAM(s) Oral three times a day  trimethoprim  160 mG/sulfamethoxazole 800 mG 1 Tablet(s) Oral daily    MEDICATIONS  (PRN):  acetaminophen     Tablet .. 650 milliGRAM(s) Oral every 6 hours PRN Temp greater or equal to 38C (100.4F), Mild Pain (1 - 3)  aluminum hydroxide/magnesium hydroxide/simethicone Suspension 30 milliLiter(s) Oral every 4 hours PRN Dyspepsia  melatonin 3 milliGRAM(s) Oral at bedtime PRN Insomnia  ondansetron Injectable 4 milliGRAM(s) IV Push every 8 hours PRN Nausea and/or Vomiting      Allergies    codeine (Nausea)  doxycycline (Nausea)  penicillin (Hives)    Intolerances        FAMILY HISTORY:  FH: lung cancer (Sibling)    FH: CAD (coronary artery disease) (Sibling)        Vital Signs Last 24 Hrs  T(C): 36.9 (22 Jan 2023 11:40), Max: 37 (22 Jan 2023 05:06)  T(F): 98.4 (22 Jan 2023 11:40), Max: 98.6 (22 Jan 2023 05:06)  HR: 102 (22 Jan 2023 11:40) (87 - 104)  BP: 121/68 (22 Jan 2023 11:40) (114/64 - 144/76)  BP(mean): --  RR: 18 (22 Jan 2023 11:40) (18 - 18)  SpO2: 90% (22 Jan 2023 11:40) (90% - 98%)    Parameters below as of 22 Jan 2023 11:40  Patient On (Oxygen Delivery Method): nasal cannula  O2 Flow (L/min): 2      Physical Exam:  General: No apparent distress  HEENT: EOMI, MMM  CVS: +S1/S2, RRR, no murmurs/rubs/gallops, Chemo-port  Resp: On 3L of ncO2. Hypoventilation in the L>R  GI: Soft, NT/ND +BS  MSK:   MSK: no synovitis, joints NTP    tongue: midline, no deviation   neck flexion/extension: 5/5   deltoid: 5/5 B/L  biceps/triceps: 5/5 B/L  Hand/: 5/5 B/L  Hip flexion/extension: 5/5 B/L  knee flexion/extension: 5/5 B/L    dorsiflexion: 3/5 L, 5/5 B/L  plantar flex: 5/5 B/L  Neuro: AAOx3  Skin: non blanching multiple purpuric lesions in the lower extremities, arms, and chest     LABS:                        9.6    44.17 )-----------( 34       ( 22 Jan 2023 07:13 )             29.7     01-22    137  |  102  |  12  ----------------------------<  64<L>  4.0   |  20<L>  |  0.55    Ca    8.5      22 Jan 2023 07:11  Mg     1.9     01-21    TPro  6.5  /  Alb  3.3  /  TBili  0.6  /  DBili  x   /  AST  20  /  ALT  5<L>  /  AlkPhos  63  01-22      Reticulocyte Percent: 0.5 % (01.21.23 @ 07:25) Procalcitonin, Serum: 0.16: This assay is intended for use to determine the change of PCT over time Lactate Dehydrogenase, Serum: 890 U/L (01.21.23 @ 07:22) Folate, Serum: >20.0 ng/mL (01.19.23 @ 11:19) Vitamin B12, Serum: >2000 pg/mL (01.19.23 @ 11:19)         RADIOLOGY & ADDITIONAL STUDIES:  `< from: CT Abdomen and Pelvis w/ IV Cont (01.18.23 @ 17:19) >  No pulmonary arterial embolism.    Status post wedge resections in both upper lobes. 2 cm nodular opacity   associated with the staple line in the right upper lobe is noted.   Etiology is unclear.    Multiple ill-defined opacities are noted within both lungs, more so in   the right lower lobe. Exact etiology is unclear.    < end of copied text >  < from: CT Abdomen and Pelvis w/ IV Cont (01.18.23 @ 17:19) >  LUNGS AND LARGE AIRWAYS: Patent central airways. Bilateral upper lobe   wedge resection. 2 cm nodular opacity associated with the staple line is   noted in the right upper lobe. 1.3 cm nodule containing punctate   calcification in the left upper lobe is unchanged when compared to   previous exam. New ill-defined opacities are noted within both lungs,   more so in the right lower lobe. Stable right lower lobe cystic change.  PLEURA: Moderate partially loculated right pleural effusion with adjacent   passive atelectasis. Small left pleural effusion. Previously seen   pneumothorax is not present on the current exam.    < end of copied text >

## 2023-01-22 NOTE — CONSULT NOTE ADULT - ASSESSMENT
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 Patient was sent to Rehab. Subsequently on 1/18 patient is readmitted for productive cough and general weakness w/hb 5. Rheumatology was consulted for further help in management.    #AAV-MPO  -Patient w/hx of mononeuritis multiplex, kidney involvement, and lung   -Follows w/Dr. Kasandra Plaza, rheumatologist  -Previous treatment on RTX, transitioned to AZA and off while patient was on R-CHOP   -CT chest IN 11/2022 Increased mediastinal lymphadenopathy. A reference low right paratracheal node measures 2.4 x 1.8 cm (2, 39), interval increase in size from prior when it measured 2.0 x 1.2 cm. -11/10/22 R wedge resection showed capillaritis with fibrin, giant cells and inflammation around small and intermediate sized vessels. Elastic stain showed vasculocentricity of the inflammation. Lymph node showed:  Focal giant cells and focal necrosis are seen in the lymph node as well   -CT chest on this admission showed multiple ill-defined opacities are noted within both lungs, more so in the right lower lobe. Exact etiology is unclear. Bilateral pleural effusions, right more than left.  -On exam: Left paralysis on dorsiflexion and skin concerning for non-blanching purpura in lower extremities   -On prednisone 20 mg daily   -Concerning for reactivation of AAV-MPO    #Bicytopenia   -Admitted w/H/H 5.9/18.2, platelet 68 requiring 1 U PRBC  -Denied hemoptysis, melena, hematochezia, and hematuria  -Etiology is unclear and plan for bone marrow bx per Hem/Onc w/underlying hx of Large B cell lymphoma       #C.diff  Patient endorsed diarrhea stopped     #Leukocytosis  WBC 49 on admission     Plan  -We will plan for 1 mg/Kg steroid for possible AAV-MPO relapse but we will hold with possible plan for bone marrow bx on 1/23/23  -We will discuss with radiologist for the CT chest finding   -Check immunoglobulin levels   -We will use RTX as possible maintenance therapy after checking her neoplasia status and r/o infection   -Repeat QuantiFeron, and hepatitis in anticipation of immunosuppressive therapy   -Recommend PCP prophylaxis and PPI   -Need to discuss with ID regarding the C.Diff colitis status     We will follow the case, appreciate the consult  DW Dr. 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 Patient was sent to Rehab. Subsequently on 1/18 patient is readmitted for productive cough and general weakness w/hb 5. Rheumatology was consulted for further help in management.    #AAV-MPO  -Patient w/hx of mononeuritis multiplex, kidney  and lung involvement,  -Previous treatment with RTX, transitioned to AZA and off any therapy while receiving R-CHOP   -CT chest IN 11/2022 Increased mediastinal lymphadenopathy. A reference low right paratracheal node measures 2.4 x 1.8 cm (2, 39), interval increase in size from prior when it measured 2.0 x 1.2 cm. -11/10/22 R wedge resection showed capillaritis with fibrin, giant cells and inflammation around small and intermediate sized vessels. Elastic stain showed vasculocentricity of the inflammation. Lymph node showed:  Focal giant cells and focal necrosis are seen in the lymph node as well   -CT chest on this admission showed multiple ill-defined opacities are noted within both lungs, more so in the right lower lobe. Exact etiology is unclear. Bilateral pleural effusions, right more than left.  -On exam: weakness of dorsiflexion of left foot, petechial lesions of UE and LE (?secondary to thrombocytopenia)    -On prednisone 20 mg daily   -Concerning for reactivation of AAV-MPO    #Bicytopenia   -Admitted w/H/H 5.9/18.2, platelet 68 requiring 1 U PRBC  -Denied hemoptysis, melena, hematochezia, and hematuria  -Etiology is unclear and plan for bone marrow bx per Hem/Onc w/underlying hx of Large B cell lymphoma       #C.diff  Patient endorsed diarrhea stopped     #Leukocytosis  WBC 49 on admission     Plan  -We will plan for 1 mg/Kg steroid for possible AAV-MPO relapse but we will hold with possible plan for bone marrow bx on 1/23/23  -We will discuss with radiologist the CT chest finding   -Check immunoglobulin levels and T cell subsets   -We will use RTX as possible induction therapy after BM bx (pending discussion with heme) and if active infection resolved   -Repeat QuantiFeron, and hepatitis in anticipation of immunosuppressive therapy   -Recommend PCP prophylaxis and PPI   -Need to discuss with ID regarding the C.Diff colitis status     We will follow the case, appreciate the consult  DW Dr. Aguila Devine MD  PGY-4 Rheumatology Fellow  Pager: [phone]   Available in teams

## 2023-01-22 NOTE — PROGRESS NOTE ADULT - ASSESSMENT
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 She presented here from [facility] for elevated WBC and low hemoglobin, severe weakness. Pt states for the past 2-3 days has been having increased weakness and lethargy, decreased appetite. +sputum productive cough. + cui, no sob, no difficulty breathing. No abdominal pain, nausea, vomiting, no bloody stools. Has endorsed multiple episodes of loose stools x weeks, currently being treated for c.diff with oral vancomycin.       Fatigue/dyspnea: likely due to severe anemia  - pt s/p 1 unit PRBC, then another 1 unit overnight.  - no melena, no hematochezia. no BRBPR. occult stool neg.   - repeat post transfusion hgb stable.  - likely poor appetite with iron deficiency  - no clear signs of infection, will monitor off abx.   - monitor for fluid overload, she tends to require IV Lasix intermittently post transfusion.  - IV Lasix 20 mg x 1 for basilar crackles post transfusion.   - iron studies (though recent transfusion likely affect results), vit b12, D, folic, TSH.   - resume diet. doubt GI bleed   - Physical therapy. Out of bed to chair with assistance.   - Please consult House heme/onc for pancytopenia with elevated WBC. please consult Rheum for follow up treatment of vasculitis  (she is chronically on steroids). ID consulted to see if she may need PCP ppx regimen.    Diarrhea  - elevated WBC  - no documented c.diff PCR, re-ordered.  - s/p Vanco PO.   - diarrhea completely resolved.  - monitor off PO vanco per ID  - now constipated. PRN bowel regimen     Pulmonary vasculitis   - Recent TTE on 11/3/22 reviewed: normal LV. outpt card Dr. Ady Cooper  - outpt pulm Mack Tucker   - s/p thoracoscopic biopsy of mediastinal lymph node and Lung wedge resection 11/10/22  - recent pleural effusion s/p 650 cc U/S-guided rt thoracentesis 11/17/22  - exudative but no neutrophilic predominance and initial Gram stain w/o organisms  - cultures neg.   - path results favoring vasculitis: no evidence for lymphoma. Cytology also came back negative.   - comfortable on nasal canula, better post diuretic last admission.   - rheum and heme-onc following previously, will reconsult.   - last admission, she was not started on immunosuppressants such as cellcept given recent treatment for COVID19 at that time  - c/w prednisone 20 mg qdaily. consider rheum consult interms of taper regimen.   - ID consulted for PCP ppx.     hx of Tachycardia    - cont low-dose metoprolol, 50 mg to 25 mg dose reduced in rehab.   - card consult (Dr. Hooker) in the past, if needed    Anxiety and depression  - stable, continue citalopram and Remeron.  - Pt denied SI/HI ideations, denied visual and auditory hallucinations.     GERD (gastroesophageal reflux disease)  - continue PPI    Hyperlipidemia.   - continue home ezetimibe. okay to hold if nonformulary   - Lipid panel    DVT ppx   - holding AC in the setting of anemia, pancytopenia.   - will start Lovenox SQ if pancytopenia/ plts recovers.

## 2023-01-23 NOTE — PROGRESS NOTE ADULT - ASSESSMENT
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 Patient was sent to Rehab. Subsequently on 1/18 patient is readmitted for productive cough and general weakness w/hb 5. Rheumatology was consulted for further help in management.    #AAV-MPO  -Patient w/hx of mononeuritis multiplex, kidney  and lung involvement,  -Previous treatment with RTX, transitioned to AZA and off any therapy while receiving R-CHOP   -CT chest IN 11/2022 Increased mediastinal lymphadenopathy. A reference low right paratracheal node measures 2.4 x 1.8 cm (2, 39), interval increase in size from prior when it measured 2.0 x 1.2 cm. -11/10/22 R wedge resection showed capillaritis with fibrin, giant cells and inflammation around small and intermediate sized vessels. Elastic stain showed vasculocentricity of the inflammation. Lymph node showed:  Focal giant cells and focal necrosis are seen in the lymph node as well   -CT chest on this admission showed multiple ill-defined opacities are noted within both lungs, more so in the right lower lobe. Exact etiology is unclear. Bilateral pleural effusions, right more than left.  -On exam: weakness of dorsiflexion of left foot, petechial lesions of UE and LE (?secondary to thrombocytopenia)    -On prednisone 20 mg daily   -Concerning for reactivation of AAV-MPO    #Bicytopenia   -Admitted w/H/H 5.9/18.2, platelet 68 requiring 1 U PRBC  -Denied hemoptysis, melena, hematochezia, and hematuria  -Etiology is unclear and plan for bone marrow bx per Hem/Onc w/underlying hx of Large B cell lymphoma       #C.diff  Patient endorsed diarrhea stopped     #Leukocytosis  WBC 49 on admission     Plan  -Patient remains on 3L of ncO2 w/o sign of hemoptysis. W/high risk of infection and plan for bone marrow biopsy, we will hold on pulse of steroid.   -CW 20 mg prednisone  -D/w outpatient Hem/Onc and agreed with RTX   -We will discuss with radiologist the CT chest finding   -pending immunoglobulin levels and T cell subsets   -Bone marrow bx result pending  -Repeat QuantiFeron, and hepatitis in anticipation of immunosuppressive therapy   -Recommend PCP prophylaxis and PPI   -Need to discuss with ID regarding the C.Diff colitis status     We will follow the case, appreciate the consult  DW Dr. 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 Patient was sent to Rehab. Subsequently on 1/18 patient is readmitted for productive cough and general weakness w/hb 5. Rheumatology was consulted for further help in management.    #AAV-MPO  -Patient w/hx of mononeuritis multiplex, kidney  and lung involvement,  -Previous treatment with RTX, transitioned to AZA and off any therapy while receiving R-CHOP   -CT chest IN 11/2022 Increased mediastinal lymphadenopathy. A reference low right paratracheal node measures 2.4 x 1.8 cm (2, 39), interval increase in size from prior when it measured 2.0 x 1.2 cm. -11/10/22 R wedge resection showed capillaritis with fibrin, giant cells and inflammation around small and intermediate sized vessels. Elastic stain showed vasculocentricity of the inflammation. Lymph node showed:  Focal giant cells and focal necrosis are seen in the lymph node as well   -CT chest on this admission showed multiple ill-defined opacities are noted within both lungs, more so in the right lower lobe. Exact etiology is unclear. Bilateral pleural effusions, right more than left.  -On exam: weakness of dorsiflexion of left foot, petechial lesions of UE and LE (?secondary to thrombocytopenia)    -On prednisone 20 mg daily   -Concerning for reactivation of AAV-MPO    #Bicytopenia   -Admitted w/H/H 5.9/18.2, platelet 68 requiring 1 U PRBC  -Denied hemoptysis, melena, hematochezia, and hematuria  -Etiology is unclear and plan for bone marrow bx per Hem/Onc w/underlying hx of Large B cell lymphoma       #C.diff  Patient endorsed diarrhea stopped     #Leukocytosis  WBC 49 on admission     Plan  -Patient  on 2L of ncO2 w/o sign of hemoptysis. W/high risk of infection and plan for bone marrow biopsy, we will hold on pulse of steroid.   -CW 20 mg prednisone  -D/w outpatient Hem/Onc Dr Madrigal and agreed with RTX   -We will discuss with radiologist the CT chest finding   -pending immunoglobulin levels and T cell subsets   -Bone marrow bx result pending  -Repeat QuantiFeron, and hepatitis in anticipation of immunosuppressive therapy   -Recommend PCP prophylaxis and PPI   -Need to discuss with ID regarding empiric abx coverage given IS status and cytopenias, also recommended by heme    We will follow the case, appreciate the consult  DW Dr. Aguila Devine MD  PGY-4 Rheumatology Fellow  Pager: [phone]   Available in teams

## 2023-01-23 NOTE — PROGRESS NOTE ADULT - ASSESSMENT
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 She presented here from [facility] for elevated WBC and low hemoglobin, severe weakness. Pt states for the past 2-3 days has been having increased weakness and lethargy, decreased appetite. +sputum productive cough. + cui, no sob, no difficulty breathing. No abdominal pain, nausea, vomiting, no bloody stools. Has endorsed multiple episodes of loose stools x weeks, currently being treated for c.diff with oral vancomycin.       Fatigue/dyspnea: likely due to severe anemia  - pt s/p 1 unit PRBC, then another 1 unit overnight.  - no melena, no hematochezia. no BRBPR. occult stool neg.   - repeat post transfusion hgb stable.  - likely poor appetite with iron deficiency  - no clear signs of infection, will monitor off abx.   - monitor for fluid overload, she tends to require IV Lasix intermittently post transfusion.  - IV Lasix 20 mg x 1 for basilar crackles post transfusion.   - iron studies (though recent transfusion likely affect results), vit b12, D, folic, TSH.   - resume diet. doubt GI bleed   - Physical therapy. Out of bed to chair with assistance.   - Please consult House heme/onc for pancytopenia with elevated WBC. please consult Rheum for follow up treatment of vasculitis  (she is chronically on steroids). ID consulted to see if she may need PCP ppx regimen.    Diarrhea  - elevated WBC  - no documented c.diff PCR, re-ordered.  - s/p Vanco PO.   - diarrhea completely resolved.  - monitor off PO vanco per ID  - now constipated. PRN bowel regimen     Pulmonary vasculitis   - Recent TTE on 11/3/22 reviewed: normal LV. outpt card Dr. Ady Cooper  - outpt pulm Mack Tucker   - s/p thoracoscopic biopsy of mediastinal lymph node and Lung wedge resection 11/10/22  - recent pleural effusion s/p 650 cc U/S-guided rt thoracentesis 11/17/22  - exudative but no neutrophilic predominance and initial Gram stain w/o organisms  - cultures neg.   - path results favoring vasculitis: no evidence for lymphoma. Cytology also came back negative.   - comfortable on nasal canula, better post diuretic last admission.   - rheum and heme-onc following previously, will reconsult.   - last admission, she was not started on immunosuppressants such as cellcept given recent treatment for COVID19 at that time  - c/w prednisone 20 mg qdaily. consider rheum consult interms of taper regimen.   - ID consulted for PCP ppx.     hx of Tachycardia    - cont low-dose metoprolol, 50 mg to 25 mg dose reduced in rehab.   - card consult (Dr. Hooker) in the past, if needed    Anxiety and depression  - stable, continue citalopram and Remeron.  - Pt denied SI/HI ideations, denied visual and auditory hallucinations.     GERD (gastroesophageal reflux disease)  - continue PPI    Hyperlipidemia.   - continue home ezetimibe. okay to hold if nonformulary   - Lipid panel    DVT ppx   - holding AC in the setting of anemia, pancytopenia.   - will start Lovenox SQ if pancytopenia/ plts recovers.

## 2023-01-23 NOTE — PROGRESS NOTE ADULT - NSPROGADDITIONALINFOA_GEN_ALL_CORE
d/w pt and NP.  d/w RN.  d/w ID.   Rheum f/u.     - Dr. MARY Arias ([facility])  - [phone]

## 2023-01-23 NOTE — PROGRESS NOTE ADULT - ASSESSMENT
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 Hematology consulted for further work up.     # Hx of DLBCL EBV+  - Follows with Dr Madrigal, s/p R-mini-chop in 2021   - Recent bone marrow biopsy for new anemia in Nov /2022 did not show any disease recurrence.   - CBC at admission with anemia of 5.9; platelet count of 68, wbc of 45.18. Review of records, patient with anemia since at least october 2022, however thrombocytopenia and leukocytosis is new.   - Received pRBC transfusion and responded appropriately, platelets downtrending ~30k  - Iron panel consistent AOCD; Ferritin elevated at 5768; B12 elevated, folate>20   - Coags elevated, fibrinogen pending  - CT C/A/P with contrast without signs of LAD however, Multiple ill-defined opacities are noted within both lungs, more so in the right lower lobe. Exact etiology is unclear. B/L pleural effusions R>L  - reticulocyte count wnl,  elevated from 200s prior, procal mildly elevated .16  - RVP/covid negative, Bcx ngtd, no UA/Ucx available to review  - peripheral smear reviewed by hematology team during the initial consultation after this admission: no blasts, but immature WBCs noted, no schistocytes, thrombocytopenia noted  - At this time, no clear etiology for the leukocytosis. Per Outpatient oncologist, this has happened previously, may require treatment with rituximab and steroids if remains bicytopenic with leukocytosis. Given the leukocytosis and chronic steroids, would likely recommend coverage with empiric antibiotics until cultures negative: currently on bactrim DS daily for ppx  (off IV abx and monitoring)  - sent peripheral flow cytometry w/ cytogenetics and FISH pending final result.   - Bone marrow biopsy today 1/23/2023;   - Supportive transfusions for plts <10 if afebrile, <15 if febrile, 50 if bleeding, hbg <7   - ID following, recommend rheum consult      note is not finalized until signed by attending   Sal Nye PGY5   hem & onc fellow [phone]

## 2023-01-23 NOTE — PROCEDURE NOTE - ADDITIONAL PROCEDURE DETAILS
Bone Marrow Aspiration/Biopsy           Indication: DLBCL          Bone marrow aspiration and biopsy procedure description, risks, and benefits were discussed in detail with the patient.  All questions were answered.  Informed consent was obtained and time-out performed.             The area of the left posterior iliac crest was prepped and draped using sterile technique. Local anesthetic with  2% Lidocaine     Bone marrow aspiration and biopsy  was performed using sterile technique  by myself. Specimens were obtained.           The procedure was well tolerated and no local bleeding or other complications were observed.  Pressure was applied to the procedure site and a wound dressing was placed.  The patient and nursing staff were advised that the patient is to lie flat for 30 minutes post procedure. Tylenol may be used if no contraindications for pain at the procedure site.

## 2023-01-23 NOTE — PROGRESS NOTE ADULT - ASSESSMENT
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 She presented here from [facility] for elevated WBC and low hemoglobin, severe weakness. Pt states for the past 2-3 days has been having increased weakness and lethargy, decreased appetite. Reports cough, currently nonproductive, no sob, no difficulty breathing. No abdominal pain, nausea, vomiting, no bloody stools. Has endorsed multiple episodes of loose stools x weeks, currently being treated for c.diff with oral vancomycin.     Fatigue/dyspnea likely due to severe anemia s/p transfusions  Diarrhea - recently tried marijuana to help with appetite and noted worsening    - off po vancomycin given no diarrhea, unable to send sample   - now noted with constipation--started on bowel regimen  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  - Pulmonary, Rheumatology and Heme/Onc following    -noted plan for induction therapy to treat vasculitis pending possible BMBx  Leukocytosis -- unclear etiology - no clear infectious etiology, ?reactive in setting of above and also on steroids    - RVP/COVID negative, has chronic cough-unchanged    - L chest port without sign of infection, no s/o SSTI   - overall exam without focal findings, abd benign   - CT no PE, s/p wedge resections in b/l upper lobes, 2 cm nodular opacity a/w staple line in RUL-unclear etiology; multiple ill-defined opacities in both lungs more in RLL -unclear etiology, b/l effusions R>L   - Prior hx/chart reviewed; cultures were negative at that time    - Bcx here negative to date x2     Recommendations:  Discontinued contact/enteric isolation given no BM since admission, less likely C.diff  Continue on Bactrim DS 1 tablet daily for PCP ppx while on prednisone  Can monitor off abx other than above   Monitor temps/CBC      Yevgeniy Malave M.D.  [facility], Division of Infectious Diseases  [phone]  After 5pm on weekdays and all day on weekends - please call [phone]

## 2023-01-23 NOTE — PROGRESS NOTE ADULT - SUBJECTIVE AND OBJECTIVE BOX
BETTIE WENDY  [id number]    INTERVAL HPI/OVERNIGHT EVENTS:  Patient was seen at the bedside. She remains in 3L of ncO2. Plan for bone marrow bx today.       PMHx/PSHx/FamHx/SocHx reviewed and no significant changes    REVIEW OF SYSTEMS   - reviewed with patient and  negative other than as above or previously documented.     MEDICATIONS  (STANDING):  budesonide 160 MICROgram(s)/formoterol 4.5 MICROgram(s) Inhaler 2 Puff(s) Inhalation two times a day  chlorhexidine 2% Cloths 1 Application(s) Topical daily  cholecalciferol 2000 Unit(s) Oral daily  citalopram 10 milliGRAM(s) Oral daily  fluticasone propionate 50 MICROgram(s)/spray Nasal Spray 1 Spray(s) Both Nostrils two times a day  folic acid 1 milliGRAM(s) Oral daily  guaiFENesin  milliGRAM(s) Oral every 12 hours  influenza  Vaccine (HIGH DOSE) 0.7 milliLiter(s) IntraMuscular once  ipratropium    for Nebulization 500 MICROGram(s) Nebulizer every 6 hours  metoprolol tartrate 25 milliGRAM(s) Oral two times a day  mirtazapine 7.5 milliGRAM(s) Oral daily  pantoprazole    Tablet 40 milliGRAM(s) Oral before breakfast  polyethylene glycol 3350 17 Gram(s) Oral daily  predniSONE   Tablet 20 milliGRAM(s) Oral daily  senna 1 Tablet(s) Oral daily  sodium bicarbonate 650 milliGRAM(s) Oral three times a day  trimethoprim  160 mG/sulfamethoxazole 800 mG 1 Tablet(s) Oral daily    MEDICATIONS  (PRN):  acetaminophen     Tablet .. 650 milliGRAM(s) Oral every 6 hours PRN Temp greater or equal to 38C (100.4F), Mild Pain (1 - 3)  aluminum hydroxide/magnesium hydroxide/simethicone Suspension 30 milliLiter(s) Oral every 4 hours PRN Dyspepsia  melatonin 3 milliGRAM(s) Oral at bedtime PRN Insomnia  ondansetron Injectable 4 milliGRAM(s) IV Push every 8 hours PRN Nausea and/or Vomiting      Allergies    codeine (Nausea)  doxycycline (Nausea)  penicillin (Hives)    Intolerances          Vital Signs Last 24 Hrs  T(C): 36.6 (2023 17:14), Max: 36.7 (2023 04:46)  T(F): 97.8 (2023 17:14), Max: 98.1 (2023 04:46)  HR: 101 (2023 17:14) (81 - 101)  BP: 121/70 (2023 17:14) (107/63 - 127/74)  BP(mean): --  RR: 18 (2023 17:14) (18 - 18)  SpO2: 93% (2023 17:14) (91% - 95%)    Parameters below as of 2023 17:14  Patient On (Oxygen Delivery Method): nasal cannula  O2 Flow (L/min): 2      Physical Exam:  General: No apparent distress  HEENT: EOMI, MMM  CVS: +S1/S2, RRR, no murmurs/rubs/gallops, Chemo-port  Resp: On 3L of ncO2. Hypoventilation in the L>R  GI: Soft, NT/ND +BS  MSK:   MSK: no synovitis, joints NTP    tongue: midline, no deviation   neck flexion/extension: 5/5   deltoid: 5/5 B/L  biceps/triceps: 5/5 B/L  Hand/: 5/5 B/L  Hip flexion/extension: 5/5 B/L  knee flexion/extension: 5/5 B/L    dorsiflexion: 3/5 L, 5/5 B/L  plantar flex: 5/5 B/L  Neuro: AAOx3  Skin: non blanching multiple purpuric lesions in the lower extremities, arms, and chest       LABS:                        9.6    44.17 )-----------( 34       ( 2023 07:13 )             29.7         137  |  102  |  12  ----------------------------<  64<L>  4.0   |  20<L>  |  0.55    Ca    8.5      2023 07:11    TPro  6.5  /  Alb  3.3  /  TBili  0.6  /  DBili  x   /  AST  20  /  ALT  5<L>  /  AlkPhos  63        Urinalysis Basic - ( 2023 07:16 )    Color: Yellow / Appearance: Clear / S.025 / pH: x  Gluc: x / Ketone: Negative  / Bili: Negative / Urobili: Negative   Blood: x / Protein: 100 mg/dl / Nitrite: Negative   Leuk Esterase: Negative / RBC: 6 /hpf / WBC 2 /HPF   Sq Epi: x / Non Sq Epi: 2 /hpf / Bacteria: Negative          RADIOLOGY & ADDITIONAL TESTS:       BETTIE WENDY  [id number]    INTERVAL HPI/OVERNIGHT EVENTS:  Patient was seen at the bedside. She remains on 2L of ncO2. Plan for bone marrow bx today.       PMHx/PSHx/FamHx/SocHx reviewed and no significant changes    REVIEW OF SYSTEMS   - reviewed with patient and  negative other than as above or previously documented.     MEDICATIONS  (STANDING):  budesonide 160 MICROgram(s)/formoterol 4.5 MICROgram(s) Inhaler 2 Puff(s) Inhalation two times a day  chlorhexidine 2% Cloths 1 Application(s) Topical daily  cholecalciferol 2000 Unit(s) Oral daily  citalopram 10 milliGRAM(s) Oral daily  fluticasone propionate 50 MICROgram(s)/spray Nasal Spray 1 Spray(s) Both Nostrils two times a day  folic acid 1 milliGRAM(s) Oral daily  guaiFENesin  milliGRAM(s) Oral every 12 hours  influenza  Vaccine (HIGH DOSE) 0.7 milliLiter(s) IntraMuscular once  ipratropium    for Nebulization 500 MICROGram(s) Nebulizer every 6 hours  metoprolol tartrate 25 milliGRAM(s) Oral two times a day  mirtazapine 7.5 milliGRAM(s) Oral daily  pantoprazole    Tablet 40 milliGRAM(s) Oral before breakfast  polyethylene glycol 3350 17 Gram(s) Oral daily  predniSONE   Tablet 20 milliGRAM(s) Oral daily  senna 1 Tablet(s) Oral daily  sodium bicarbonate 650 milliGRAM(s) Oral three times a day  trimethoprim  160 mG/sulfamethoxazole 800 mG 1 Tablet(s) Oral daily    MEDICATIONS  (PRN):  acetaminophen     Tablet .. 650 milliGRAM(s) Oral every 6 hours PRN Temp greater or equal to 38C (100.4F), Mild Pain (1 - 3)  aluminum hydroxide/magnesium hydroxide/simethicone Suspension 30 milliLiter(s) Oral every 4 hours PRN Dyspepsia  melatonin 3 milliGRAM(s) Oral at bedtime PRN Insomnia  ondansetron Injectable 4 milliGRAM(s) IV Push every 8 hours PRN Nausea and/or Vomiting      Allergies    codeine (Nausea)  doxycycline (Nausea)  penicillin (Hives)    Intolerances          Vital Signs Last 24 Hrs  T(C): 36.6 (2023 17:14), Max: 36.7 (2023 04:46)  T(F): 97.8 (2023 17:14), Max: 98.1 (2023 04:46)  HR: 101 (2023 17:14) (81 - 101)  BP: 121/70 (2023 17:14) (107/63 - 127/74)  BP(mean): --  RR: 18 (2023 17:14) (18 - 18)  SpO2: 93% (2023 17:14) (91% - 95%)    Parameters below as of 2023 17:14  Patient On (Oxygen Delivery Method): nasal cannula  O2 Flow (L/min): 2      Physical Exam:  General: No apparent distress  HEENT: EOMI, MMM  CVS: +S1/S2, RRR, no murmurs/rubs/gallops, Chemo-port  Resp: On 3L of ncO2. Hypoventilation in the L>R  GI: Soft, NT/ND +BS  MSK:   MSK: no synovitis, joints NTP    tongue: midline, no deviation   neck flexion/extension: 5/5   deltoid: 5/5 B/L  biceps/triceps: 5/5 B/L  Hand/: 5/5 B/L  Hip flexion/extension: 5/5 B/L  knee flexion/extension: 5/5 B/L    dorsiflexion: 3/5 L, 5/5 B/L  plantar flex: 5/5 B/L  Neuro: AAOx3  Skin: non blanching multiple purpuric lesions in the lower extremities, arms, and chest       LABS:                        9.6    44.17 )-----------( 34       ( 2023 07:13 )             29.7         137  |  102  |  12  ----------------------------<  64<L>  4.0   |  20<L>  |  0.55    Ca    8.5      2023 07:11    TPro  6.5  /  Alb  3.3  /  TBili  0.6  /  DBili  x   /  AST  20  /  ALT  5<L>  /  AlkPhos  63  -      Urinalysis Basic - ( 2023 07:16 )    Color: Yellow / Appearance: Clear / S.025 / pH: x  Gluc: x / Ketone: Negative  / Bili: Negative / Urobili: Negative   Blood: x / Protein: 100 mg/dl / Nitrite: Negative   Leuk Esterase: Negative / RBC: 6 /hpf / WBC 2 /HPF   Sq Epi: x / Non Sq Epi: 2 /hpf / Bacteria: Negative          RADIOLOGY & ADDITIONAL TESTS:

## 2023-01-23 NOTE — PROGRESS NOTE ADULT - ASSESSMENT
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 She presented here from [facility] for elevated WBC and low hemoglobin, severe weakness. Pt states for the past 2-3 days has been having increased weakness and lethargy, decreased appetite. +sputum productive cough. + cui, no sob, no difficulty breathing. No abdominal pain, nausea, vomiting, no bloody stools. Has endorsed multiple episodes of loose stools x weeks, currently being treated for c.diff with oral vancomycin.       Fatigue/dyspnea: likely due to severe anemia :  pt s/p 1 unit PRB  Diarrhea  Pulmonary vasculitis  :  hx of Tachycardia :  Recent TTE on 11/3/22 reviewed: normal LV. outpt card Dr. Ady Cooper  Anxiety and depression  GERD (gastroesophageal reflux disease)  Hyperlipidemia.   DVT ppx     1/20:  Fatigue/dyspnea: likely due to severe anemia :  pt s/p 1 unit PRBC: hb now  > 10  : she is on 2 L of oxygen : no wheezing: no overt signs of bleeding : ct chest is abnormal report noted:  has jean-claude ill defined opacities of unclear etiology  : venous ph is mild acidotic:  no need for Bipap at this time : monitor and trend hb  Diarrhea : symptomatic rx:  workup per primary team  Pulmonary vasculitis  : per rheumatology  : had recent vats surgery : LN biopsy noted:   hx of Tachycardia :  Recent TTE on 11/3/22 reviewed: normal LV. outpt card Dr. Ady Cooper  Anxiety and depression  GERD (gastroesophageal reflux disease) : ppi   Hyperlipidemia.   recent covid  : o n last admission she was treated for covid infection:   DVT ppx   d wteam    1/22:    Fatigue/dyspnea: likely due to severe anemia :  pt s/p 1 unit PRBC: hb now  > 10  : she is on 2 L of oxygen : no wheezing: no overt signs of bleeding : ct chest is abnormal report noted:  has jean-claude ill defined opacities of unclear etiology  : venous ph is mild acidotic:  no need for Bipap at this time : monitor and trend hb: she remained stable o gracie last 1 days:  she is not on any antibtiocs at this time: RVP  and blood cultures are negative: she had ebus biopsy also before: reviewed: ID following  Diarrhea : symptomatic rx:  workup per primary team : seems to have resolved  Pulmonary vasculitis  : per rheumatology  : had recent vats surgery : LN biopsy noted:   Bicytopneia and leucocytosis: ? etiology  : for possible bone marrow biopsy on Monday    hx of Tachycardia :  Recent TTE on 11/3/22 reviewed: normal LV. outpt card Dr. Ady Cooper  Anxiety and depression  GERD (gastroesophageal reflux disease) : ppi   Hyperlipidemia.   recent covid  : on last admission she was treated for covid infection:   DVT ppx   dw team    1/23:    Fatigue/dyspnea: likely due to severe anemia : feels weak  but no bleeding noted:  on 2 L of oxygen : she needs PT OT   Diarrhea :resolved:   Pulmonary vasculitis  : per rheumatology  : had recent vats surgery : LN biopsy noted: : she never received teat ment for vasculitis except low dose steroids:  she is awaiting bone marrow biopsy prior to induction therapy with rituximab: The ct chest done on this admission does not show pneumothorax and has jean-claude effusions:  There are some new opacites in rigth lower lobe which were not therre:  clinically she does not seem to have pneumonia: ID following: could it be a prt of vasculitis  Bicytopneia and leucocytosis: ? etiology  : for possible bone marrow biopsy today  hx of Tachycardia :  Recent TTE on 11/3/22 reviewed: normal LV. outpt card Dr. Ady Cooper  Anxiety and depression  GERD (gastroesophageal reflux disease) : ppi   Hyperlipidemia.   recent covid  : on last admission she was treated for covid infection:   DVT ppx   dw team

## 2023-01-23 NOTE — PROGRESS NOTE ADULT - ATTENDING COMMENTS
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 Hematology consulted for further work up.     # Hx of DLBCL EBV+  - Recent bone marrow biopsy for new anemia in Nov /2022 did not show disease recurrence.   - CT C/A/P with contrast without signs of LAD however, Multiple ill-defined opacities are noted within both lungs, more so in the right lower lobe.   - peripheral smear reviewed by hematology team during the initial consultation after this admission: no blasts, but immature WBCs noted, no schistocytes, thrombocytopenia noted  - Bone marrow biopsy today 1/23/2023;

## 2023-01-23 NOTE — PROGRESS NOTE ADULT - SUBJECTIVE AND OBJECTIVE BOX
Date of Service: 23 @ 11:43    Patient is a 82y old  Female who presents with a chief complaint of weakness (2023 09:30)      Any change in ROS: on 2 L of oxygen : feels OK:  no cough : feels weak      MEDICATIONS  (STANDING):  budesonide 160 MICROgram(s)/formoterol 4.5 MICROgram(s) Inhaler 2 Puff(s) Inhalation two times a day  chlorhexidine 2% Cloths 1 Application(s) Topical daily  cholecalciferol 2000 Unit(s) Oral daily  citalopram 10 milliGRAM(s) Oral daily  fluticasone propionate 50 MICROgram(s)/spray Nasal Spray 1 Spray(s) Both Nostrils two times a day  folic acid 1 milliGRAM(s) Oral daily  guaiFENesin  milliGRAM(s) Oral every 12 hours  influenza  Vaccine (HIGH DOSE) 0.7 milliLiter(s) IntraMuscular once  ipratropium    for Nebulization 500 MICROGram(s) Nebulizer every 6 hours  metoprolol tartrate 25 milliGRAM(s) Oral two times a day  mirtazapine 7.5 milliGRAM(s) Oral daily  pantoprazole    Tablet 40 milliGRAM(s) Oral before breakfast  polyethylene glycol 3350 17 Gram(s) Oral daily  predniSONE   Tablet 20 milliGRAM(s) Oral daily  senna 1 Tablet(s) Oral daily  sodium bicarbonate 650 milliGRAM(s) Oral three times a day  trimethoprim  160 mG/sulfamethoxazole 800 mG 1 Tablet(s) Oral daily    MEDICATIONS  (PRN):  acetaminophen     Tablet .. 650 milliGRAM(s) Oral every 6 hours PRN Temp greater or equal to 38C (100.4F), Mild Pain (1 - 3)  aluminum hydroxide/magnesium hydroxide/simethicone Suspension 30 milliLiter(s) Oral every 4 hours PRN Dyspepsia  melatonin 3 milliGRAM(s) Oral at bedtime PRN Insomnia  ondansetron Injectable 4 milliGRAM(s) IV Push every 8 hours PRN Nausea and/or Vomiting    Vital Signs Last 24 Hrs  T(C): 36.7 (2023 04:46), Max: 36.7 (2023 04:46)  T(F): 98.1 (2023 04:46), Max: 98.1 (2023 04:46)  HR: 92 (2023 04:46) (81 - 92)  BP: 127/74 (2023 04:46) (124/72 - 127/74)  BP(mean): --  RR: 18 (2023 04:46) (18 - 18)  SpO2: 92% (2023 04:46) (92% - 95%)    Parameters below as of 2023 04:46  Patient On (Oxygen Delivery Method): nasal cannula  O2 Flow (L/min): 2      I&O's Summary    2023 07:01  -  2023 07:00  --------------------------------------------------------  IN: 240 mL / OUT: 0 mL / NET: 240 mL          Physical Exam:   GENERAL: NAD, well-groomed, well-developed  HEENT: RANJIT/   Atraumatic, Normocephalic  ENMT: No tonsillar erythema, exudates, or enlargement; Moist mucous membranes, Good dentition, No lesions  NECK: Supple, No JVD, Normal thyroid  CHEST/LUNG: no wheezing:  poor air entry   CVS: Regular rate and rhythm; No murmurs, rubs, or gallops  GI: : Soft, Nontender, Nondistended; Bowel sounds present  NERVOUS SYSTEM:  Alert & Oriented X3  EXTREMITIES: - edema  LYMPH: No lymphadenopathy noted  SKIN: No rashes or lesions  ENDOCRINOLOGY: No Thyromegaly  PSYCH: calm    Labs:  20, 20                            9.6    44.17 )-----------( 34       ( 2023 07:13 )             29.7                         10.2   40.74 )-----------( 33       ( 2023 07:25 )             30.7                         10.2   46.62 )-----------( 33       ( 2023 07:13 )             30.7         137  |  102  |  12  ----------------------------<  64<L>  4.0   |  20<L>  |  0.55      141  |  104  |  12  ----------------------------<  95  3.4<L>   |  21<L>  |  0.55      142  |  107  |  14  ----------------------------<  58<L>  4.0   |  23  |  0.67    Ca    8.5      2023 07:11    TPro  6.5  /  Alb  3.3  /  TBili  0.6  /  DBili  x   /  AST  20  /  ALT  5<L>  /  AlkPhos  63    TPro  5.8<L>  /  Alb  3.2<L>  /  TBili  0.5  /  DBili  0.2  /  AST  16  /  ALT  5<L>  /  AlkPhos  57      CAPILLARY BLOOD GLUCOSE          LIVER FUNCTIONS - ( 2023 07:11 )  Alb: 3.3 g/dL / Pro: 6.5 g/dL / ALK PHOS: 63 U/L / ALT: 5 U/L / AST: 20 U/L / GGT: x             Urinalysis Basic - ( 2023 07:16 )    Color: Yellow / Appearance: Clear / S.025 / pH: x  Gluc: x / Ketone: Negative  / Bili: Negative / Urobili: Negative   Blood: x / Protein: 100 mg/dl / Nitrite: Negative   Leuk Esterase: Negative / RBC: 6 /hpf / WBC 2 /HPF   Sq Epi: x / Non Sq Epi: 2 /hpf / Bacteria: Negative      Procalcitonin, Serum: 0.16 ng/mL ( @ 07:22)        RECENT CULTURES:   @ 15:50 .Blood Blood-Peripheral     rad< from: CT Angio Chest PE Protocol w/ IV Cont (23 @ 17:19) >  BOWEL: Moderate hiatal hernia. No bowel obstruction. Colonic   diverticulosis without acute diverticulitis. Stool is noted within the   large bowel.  PERITONEUM: No ascites.  RETROPERITONEUM/LYMPH NODES: No lymphadenopathy.  ABDOMINAL WALL: Within normal limits.  BONES: Multilevel degenerative changes of the spine and chronic appearing   compression fractures, most severe at the T12 vertebral body level.    IMPRESSION:  No pulmonary arterial embolism.    Status post wedge resections in both upper lobes. 2 cm nodular opacity   associated with the staple line in the right upper lobe is noted.   Etiology is unclear.    Multiple ill-defined opacities are noted within both lungs, more so in   the right lower lobe. Exact etiology is unclear.    Bilateral pleural effusions, right more than left.      --- End of Report ---           NIKA HANDY MD; Resident Radiologist  This document has been electronically signed.  SUSAN HEREDIA MD; Attending Radiologist  This document has been electronically signed. 2023  6:09PM    < end of copied text >  < from: CT Angio Chest PE Protocol w/ IV Cont (22 @ 20:31) >    FINDINGS:    PULMONARY ANGIOGRAM: No pulmonary embolism.    MEDIASTINUM / LYMPH NODES: There is a 1.9 x 2.6cm hypodensity within the   right paratracheal location the region of the previously seen small   benign-appearing lymph node may be postoperative in etiology. A few other   mediastinal lymph nodes with the largest in the precarinal location   measuring about 1.6 cm, mildly enlarged, are either unchanged or   demonstrate slight interval increase in size since 2022.   Large hiatal hernia.    HEART/VASCULATURE: The heart is normal in size. Trace pericardial fluid..   Mitral annular and coronary artery calcifications. Atherosclerotic   changes of the aorta.    AIRWAYS/LUNGS/PLEURA: Status post right upper lobe recent wedge resection   with nodular and linear opacities at the suture line, likely   postsurgical. Status post left upper lobe wedge resection with unchanged   linear opacity in the periphery of the left upper lobe. There are mild   bilateral lower lung groundglass opacities with interlobular septal   thickening. Unchanged right lower lobe bulla and cysts. There are   moderate right and small left pleural effusions with associated adjacent   passive atelectasis. A small right pneumothorax is present.    UPPER ABDOMEN: Within normal limits.    BONES/SOFT TISSUES: Left subcutaneous chest wall port with distal tip   terminating in the right atrium. Degenerative changes of the spine.   Unchanged compression deformities of T11 and L1 vertebral bodies.    IMPRESSION:    No pulmonary embolism.    Status post right upper lobe recent wedge resection with associated   postsurgical change. Right upper paratracheal 1.9 right 2.6 cm   hypodensity as described above may be postoperative in etiology. 3 month   follow-up noncontrast chest CT can be performed for further evaluation.    Moderate right and small left pleural effusions with some interlobular   septal thickening suggestive of pulmonary edema.    Small right pneumothorax.    Dr. Hernandez discussed these findings with MARGRET Marques on 2022 11:26   PM with read back.    --- End of Report ---          LAKSHMI RAMIREZ MD; Resident Radiology  This document has been electronically signed.  ISIDRO WHITESIDE MD; Attending Radiologist  This document has been electronically signed. 2022 11:02AM    < end of copied text >  < from: TTE with Doppler (w/Cont) (23 @ 13:34) >  Right Heart: Normal right atrium. Normal right ventricular  size and function. Normal tricuspid valve. Mild-moderate  tricuspid regurgitation. Normal pulmonic valve. Minimal  pulmonic regurgitation.  Pericardium/Pleura: Small pericardial effusion.   No  echocardiographic evidence of pericardial tamponade.  Bilateral pleural effusions.  Hemodynamic: Estimated right atrial pressure is 8 mm Hg.  Estimated right ventricular systolic pressure equals 52 mm  Hg, assuming right atrial pressure equals 8 mm Hg,  consistent with moderate pulmonary hypertension.  ------------------------------------------------------------------------  Conclusions:  1. Mitral annular calcification and calcified mitral  leaflets. Peak mitral valve gradient equals 13 mm Hg, mean  transmitral valve gradient equals 6 mm Hg, consistent with  moderate mitral stenosis (heart rate 93 bpm).  2. Mildly dilated left atrium.  LA volume index = 38 cc/m2.  3. Increased relative wall thickness with normal left  ventricular mass index, consistent with concentric left  ventricular remodeling.  4. Normal left ventricular systolic function. No segmental  wall motion abnormalities.   Endocardial visualization  enhanced with intravenous injection of Ultrasonic Enhancing  Agent (Definity).  5. Normal right ventricular size and function.  6. Normal tricuspid valve. Mild-moderate tricuspid  regurgitation.  7. Estimated pulmonary artery systolic pressure equals 52  mm Hg, assuming right atrial pressure equals 8 mm Hg,  consistent with moderate pulmonary pressures.  8. Small pericardial effusion.   No echocardiographic  evidence of pericardial tamponade.  *** No previous Echo exam.  ------------------------------------------------------------------------  Confirmed on  2023 - 16:15:51 by Tito Johns M.D.    < end of copied text >             No growth to date.     @ 15:30 .Blood Blood-Peripheral                No growth to date.          RESPIRATORY CULTURES:          Studies  Chest X-RAY  CT SCAN Chest   Venous Dopplers: LE:   CT Abdomen  Others

## 2023-01-23 NOTE — PROGRESS NOTE ADULT - SUBJECTIVE AND OBJECTIVE BOX
SUBJECTIVE/ OVERNIGHT EVENTS:  feeling better  no cp, no sob, no n/v/d. no abdominal pain.  no headache, no dizziness.   small BM yesterday. not loose.     --------------------------------------------------------------------------------------------  LABS:                        9.6    44.17 )-----------( 34       ( 2023 07:13 )             29.7         137  |  102  |  12  ----------------------------<  64<L>  4.0   |  20<L>  |  0.55    Ca    8.5      2023 07:11    TPro  6.5  /  Alb  3.3  /  TBili  0.6  /  DBili  x   /  AST  20  /  ALT  5<L>  /  AlkPhos  63        CAPILLARY BLOOD GLUCOSE            Urinalysis Basic - ( 2023 07:16 )    Color: Yellow / Appearance: Clear / S.025 / pH: x  Gluc: x / Ketone: Negative  / Bili: Negative / Urobili: Negative   Blood: x / Protein: 100 mg/dl / Nitrite: Negative   Leuk Esterase: Negative / RBC: 6 /hpf / WBC 2 /HPF   Sq Epi: x / Non Sq Epi: 2 /hpf / Bacteria: Negative        RADIOLOGY & ADDITIONAL TESTS:    Imaging Personally Reviewed:  [x] YES  [ ] NO    Consultant(s) Notes Reviewed:  [x] YES  [ ] NO    MEDICATIONS  (STANDING):  budesonide 160 MICROgram(s)/formoterol 4.5 MICROgram(s) Inhaler 2 Puff(s) Inhalation two times a day  chlorhexidine 2% Cloths 1 Application(s) Topical daily  cholecalciferol 2000 Unit(s) Oral daily  citalopram 10 milliGRAM(s) Oral daily  fluticasone propionate 50 MICROgram(s)/spray Nasal Spray 1 Spray(s) Both Nostrils two times a day  folic acid 1 milliGRAM(s) Oral daily  guaiFENesin  milliGRAM(s) Oral every 12 hours  influenza  Vaccine (HIGH DOSE) 0.7 milliLiter(s) IntraMuscular once  ipratropium    for Nebulization 500 MICROGram(s) Nebulizer every 6 hours  metoprolol tartrate 25 milliGRAM(s) Oral two times a day  mirtazapine 7.5 milliGRAM(s) Oral daily  pantoprazole    Tablet 40 milliGRAM(s) Oral before breakfast  polyethylene glycol 3350 17 Gram(s) Oral daily  predniSONE   Tablet 20 milliGRAM(s) Oral daily  senna 1 Tablet(s) Oral daily  sodium bicarbonate 650 milliGRAM(s) Oral three times a day  trimethoprim  160 mG/sulfamethoxazole 800 mG 1 Tablet(s) Oral daily    MEDICATIONS  (PRN):  acetaminophen     Tablet .. 650 milliGRAM(s) Oral every 6 hours PRN Temp greater or equal to 38C (100.4F), Mild Pain (1 - 3)  aluminum hydroxide/magnesium hydroxide/simethicone Suspension 30 milliLiter(s) Oral every 4 hours PRN Dyspepsia  melatonin 3 milliGRAM(s) Oral at bedtime PRN Insomnia  ondansetron Injectable 4 milliGRAM(s) IV Push every 8 hours PRN Nausea and/or Vomiting      Care Discussed with Consultants/Other Providers [x] YES  [ ] NO    Vital Signs Last 24 Hrs  T(C): 36.7 (2023 04:46), Max: 36.9 (2023 11:40)  T(F): 98.1 (2023 04:46), Max: 98.4 (2023 11:40)  HR: 92 (2023 04:46) (81 - 102)  BP: 127/74 (2023 04:46) (121/68 - 127/74)  BP(mean): --  RR: 18 (2023 04:46) (18 - 18)  SpO2: 92% (2023 04:46) (90% - 95%)    Parameters below as of 2023 04:46  Patient On (Oxygen Delivery Method): nasal cannula  O2 Flow (L/min): 2    I&O's Summary    2023 07:01  -  2023 07:00  --------------------------------------------------------  IN: 240 mL / OUT: 0 mL / NET: 240 mL        PHYSICAL EXAM:  GENERAL: NAD, well-developed, comfortable on nasal canula  HEAD:  Atraumatic, Normocephalic  EYES: EOMI, PERRLA, conjunctiva and sclera clear  NECK: Supple, No JVD  CHEST/LUNG: mild decrease breath sounds bilaterally; No wheeze   HEART: Regular rate and rhythm; No murmurs, rubs, or gallops  ABDOMEN: Soft, Nontender, Nondistended; Bowel sounds present  Neuro: AAOx3, no focal weakness, 5/5 b/l extremity strength  EXTREMITIES:  2+ Peripheral Pulses, No clubbing, cyanosis, trace b/l edema  SKIN: No rashes or lesions

## 2023-01-23 NOTE — PROGRESS NOTE ADULT - SUBJECTIVE AND OBJECTIVE BOX
INTERVAL HPI/OVERNIGHT EVENTS:  Patient S&E at bedside. No o/n events. She reports poor appetite, fatigue, and lost weight in the past several months. Denied fever/chills, SOB at rest, cough, N/V/D, abd/back pain.     VITAL SIGNS:  T(F): 98.1 (23 @ 04:46)  HR: 92 (23 @ 04:46)  BP: 127/74 (23 @ 04:46)  RR: 18 (23 @ 04:46)  SpO2: 92% (23 @ 04:46)  Wt(kg): --    PHYSICAL EXAM:    Constitutional: thin/frail, AAOX3 responded verbally well   Eyes: EOMI, sclera non-icteric  Neck: supple  Respiratory: CTAB, no wheezes or crackles   Cardiovascular: RRR  Gastrointestinal: soft, NTND, + BS  Extremities: no cyanosis, clubbing or edema   Neurological: awake and alert      MEDICATIONS  (STANDING):  budesonide 160 MICROgram(s)/formoterol 4.5 MICROgram(s) Inhaler 2 Puff(s) Inhalation two times a day  chlorhexidine 2% Cloths 1 Application(s) Topical daily  cholecalciferol 2000 Unit(s) Oral daily  citalopram 10 milliGRAM(s) Oral daily  fluticasone propionate 50 MICROgram(s)/spray Nasal Spray 1 Spray(s) Both Nostrils two times a day  folic acid 1 milliGRAM(s) Oral daily  guaiFENesin  milliGRAM(s) Oral every 12 hours  influenza  Vaccine (HIGH DOSE) 0.7 milliLiter(s) IntraMuscular once  ipratropium    for Nebulization 500 MICROGram(s) Nebulizer every 6 hours  metoprolol tartrate 25 milliGRAM(s) Oral two times a day  mirtazapine 7.5 milliGRAM(s) Oral daily  pantoprazole    Tablet 40 milliGRAM(s) Oral before breakfast  polyethylene glycol 3350 17 Gram(s) Oral daily  predniSONE   Tablet 20 milliGRAM(s) Oral daily  senna 1 Tablet(s) Oral daily  sodium bicarbonate 650 milliGRAM(s) Oral three times a day  trimethoprim  160 mG/sulfamethoxazole 800 mG 1 Tablet(s) Oral daily    MEDICATIONS  (PRN):  acetaminophen     Tablet .. 650 milliGRAM(s) Oral every 6 hours PRN Temp greater or equal to 38C (100.4F), Mild Pain (1 - 3)  aluminum hydroxide/magnesium hydroxide/simethicone Suspension 30 milliLiter(s) Oral every 4 hours PRN Dyspepsia  melatonin 3 milliGRAM(s) Oral at bedtime PRN Insomnia  ondansetron Injectable 4 milliGRAM(s) IV Push every 8 hours PRN Nausea and/or Vomiting      Allergies    codeine (Nausea)  doxycycline (Nausea)  penicillin (Hives)    Intolerances        LABS:                        9.6    44.17 )-----------( 34       ( 2023 07:13 )             29.7         137  |  102  |  12  ----------------------------<  64<L>  4.0   |  20<L>  |  0.55    Ca    8.5      2023 07:11    TPro  6.5  /  Alb  3.3  /  TBili  0.6  /  DBili  x   /  AST  20  /  ALT  5<L>  /  AlkPhos  63        Urinalysis Basic - ( 2023 07:16 )    Color: Yellow / Appearance: Clear / S.025 / pH: x  Gluc: x / Ketone: Negative  / Bili: Negative / Urobili: Negative   Blood: x / Protein: 100 mg/dl / Nitrite: Negative   Leuk Esterase: Negative / RBC: 6 /hpf / WBC 2 /HPF   Sq Epi: x / Non Sq Epi: 2 /hpf / Bacteria: Negative        RADIOLOGY & ADDITIONAL TESTS:  Studies reviewed.

## 2023-01-23 NOTE — PROGRESS NOTE ADULT - SUBJECTIVE AND OBJECTIVE BOX
[facility]  ANDREW Rodríguez Y. Patel, S. Shah, G. Casimir  [phone]  ([phone] - weekdays after 5pm and weekends)    Name: BETTIE NGUYEN  Age/Gender: 82y Female  MRN: [id number]    Interval History:  Patient seen and examined this morning.   States she feels fine, has not had any BMs here  Denies fever, chills, pain, n/v or any other complaints.  Notes reviewed. No concerning overnight events.  Afebrile.     Allergies: codeine (Nausea)  doxycycline (Nausea)  penicillin (Hives)    Objective:  Vitals:   T(F): 98.1 (23 @ 04:46), Max: 98.4 (23 @ 11:40)  HR: 92 (23 @ 04:46) (81 - 102)  BP: 127/74 (23 @ 04:46) (121/68 - 127/74)  RR: 18 (23 @ 04:46) (18 - 18)  SpO2: 92% (23 @ 04:46) (90% - 95%)  Physical Examination:  General: no acute distress, NC  HEENT: NC/AT, EOMI, anicteric, neck supple  Cardio: S1, S2 present, normal rate  Resp: clear to auscultation bilaterally  Abd: soft, NT, ND, + BS  Neuro: AAOx3, no obvious focal deficits  Ext: no edema, no cyanosis, moving extremities  Skin: warm, dry, no visible rash  Psych: appropriate affect and mood for situation  Lines: L chest port without erythema/TTP    Laboratory Studies:  CBC:                       9.6    44.17 )-----------( 34       ( 2023 07:13 )             29.7     WBC Trend:  44.17 23 @ 07:13  40.74 23 @ 07:25  46.62 23 @ 07:13  42.59 23 @ 11:20  39.93 23 @ 22:47  45.18 23 @ 16:08    CMP:     137  |  102  |  12  ----------------------------<  64<L>  4.0   |  20<L>  |  0.55    Ca    8.5      2023 07:11    TPro  6.5  /  Alb  3.3  /  TBili  0.6  /  DBili  x   /  AST  20  /  ALT  5<L>  /  AlkPhos  63      Creatinine, Serum: 0.55 mg/dL (23 @ 07:11)  Creatinine, Serum: 0.55 mg/dL (23 @ 07:22)  Creatinine, Serum: 0.67 mg/dL (23 @ 07:14)  Creatinine, Serum: 0.57 mg/dL (23 @ 11:20)  Creatinine, Serum: 0.69 mg/dL (23 @ 16:08)    LIVER FUNCTIONS - ( 2023 07:11 )  Alb: 3.3 g/dL / Pro: 6.5 g/dL / ALK PHOS: 63 U/L / ALT: 5 U/L / AST: 20 U/L / GGT: x           Urinalysis Basic - ( 2023 07:16 )  Color: Yellow / Appearance: Clear / S.025 / pH: x  Gluc: x / Ketone: Negative  / Bili: Negative / Urobili: Negative   Blood: x / Protein: 100 mg/dl / Nitrite: Negative   Leuk Esterase: Negative / RBC: x / WBC x   Sq Epi: x / Non Sq Epi: x / Bacteria: x    Microbiology: reviewed   Culture - Blood (collected 23 @ 15:50)  Source: .Blood Blood-Peripheral  Preliminary Report (23 @ 23:02):    No growth to date.    Culture - Blood (collected 23 @ 15:30)  Source: .Blood Blood-Peripheral  Preliminary Report (23 @ 23:02):    No growth to date.    Radiology: reviewed     Medications:  acetaminophen     Tablet .. 650 milliGRAM(s) Oral every 6 hours PRN  aluminum hydroxide/magnesium hydroxide/simethicone Suspension 30 milliLiter(s) Oral every 4 hours PRN  budesonide 160 MICROgram(s)/formoterol 4.5 MICROgram(s) Inhaler 2 Puff(s) Inhalation two times a day  chlorhexidine 2% Cloths 1 Application(s) Topical daily  cholecalciferol 2000 Unit(s) Oral daily  citalopram 10 milliGRAM(s) Oral daily  fluticasone propionate 50 MICROgram(s)/spray Nasal Spray 1 Spray(s) Both Nostrils two times a day  folic acid 1 milliGRAM(s) Oral daily  guaiFENesin  milliGRAM(s) Oral every 12 hours  influenza  Vaccine (HIGH DOSE) 0.7 milliLiter(s) IntraMuscular once  ipratropium    for Nebulization 500 MICROGram(s) Nebulizer every 6 hours  melatonin 3 milliGRAM(s) Oral at bedtime PRN  metoprolol tartrate 25 milliGRAM(s) Oral two times a day  mirtazapine 7.5 milliGRAM(s) Oral daily  ondansetron Injectable 4 milliGRAM(s) IV Push every 8 hours PRN  pantoprazole    Tablet 40 milliGRAM(s) Oral before breakfast  polyethylene glycol 3350 17 Gram(s) Oral daily  predniSONE   Tablet 20 milliGRAM(s) Oral daily  senna 1 Tablet(s) Oral daily  sodium bicarbonate 650 milliGRAM(s) Oral three times a day  trimethoprim  160 mG/sulfamethoxazole 800 mG 1 Tablet(s) Oral daily    Current Antimicrobials:  trimethoprim  160 mG/sulfamethoxazole 800 mG 1 Tablet(s) Oral daily    Prior/Completed Antimicrobials:  cefepime   IVPB

## 2023-01-24 NOTE — PROGRESS NOTE ADULT - SUBJECTIVE AND OBJECTIVE BOX
SUBJECTIVE/ OVERNIGHT EVENTS:  s/p bone marrow bx, results pending.  no cp, no sob, no n/v/d. no abdominal pain.  no headache, no dizziness.   +BM with stool softeners       --------------------------------------------------------------------------------------------  LABS:                        8.6    49.81 )-----------( 37       ( 2023 11:29 )             26.3             CAPILLARY BLOOD GLUCOSE            Urinalysis Basic - ( 2023 07:16 )    Color: Yellow / Appearance: Clear / S.025 / pH: x  Gluc: x / Ketone: Negative  / Bili: Negative / Urobili: Negative   Blood: x / Protein: 100 mg/dl / Nitrite: Negative   Leuk Esterase: Negative / RBC: 6 /hpf / WBC 2 /HPF   Sq Epi: x / Non Sq Epi: 2 /hpf / Bacteria: Negative        RADIOLOGY & ADDITIONAL TESTS:    Imaging Personally Reviewed:  [x] YES  [ ] NO    Consultant(s) Notes Reviewed:  [x] YES  [ ] NO    MEDICATIONS  (STANDING):  budesonide 160 MICROgram(s)/formoterol 4.5 MICROgram(s) Inhaler 2 Puff(s) Inhalation two times a day  chlorhexidine 2% Cloths 1 Application(s) Topical daily  cholecalciferol 2000 Unit(s) Oral daily  citalopram 10 milliGRAM(s) Oral daily  fluticasone propionate 50 MICROgram(s)/spray Nasal Spray 1 Spray(s) Both Nostrils two times a day  folic acid 1 milliGRAM(s) Oral daily  guaiFENesin  milliGRAM(s) Oral every 12 hours  influenza  Vaccine (HIGH DOSE) 0.7 milliLiter(s) IntraMuscular once  ipratropium    for Nebulization 500 MICROGram(s) Nebulizer every 6 hours  metoprolol tartrate 25 milliGRAM(s) Oral two times a day  mirtazapine 7.5 milliGRAM(s) Oral daily  pantoprazole    Tablet 40 milliGRAM(s) Oral before breakfast  polyethylene glycol 3350 17 Gram(s) Oral daily  predniSONE   Tablet 20 milliGRAM(s) Oral daily  senna 1 Tablet(s) Oral daily  sodium bicarbonate 650 milliGRAM(s) Oral three times a day  trimethoprim  160 mG/sulfamethoxazole 800 mG 1 Tablet(s) Oral daily    MEDICATIONS  (PRN):  acetaminophen     Tablet .. 650 milliGRAM(s) Oral every 6 hours PRN Temp greater or equal to 38C (100.4F), Mild Pain (1 - 3)  aluminum hydroxide/magnesium hydroxide/simethicone Suspension 30 milliLiter(s) Oral every 4 hours PRN Dyspepsia  melatonin 3 milliGRAM(s) Oral at bedtime PRN Insomnia  ondansetron Injectable 4 milliGRAM(s) IV Push every 8 hours PRN Nausea and/or Vomiting      Care Discussed with Consultants/Other Providers [x] YES  [ ] NO    Vital Signs Last 24 Hrs  T(C): 36.8 (2023 12:15), Max: 36.8 (2023 12:15)  T(F): 98.2 (2023 12:15), Max: 98.2 (2023 12:15)  HR: 95 (2023 17:00) (90 - 95)  BP: 122/65 (2023 17:00) (103/63 - 144/77)  BP(mean): --  RR: 17 (2023 17:00) (17 - 19)  SpO2: 95% (2023 17:00) (91% - 95%)    Parameters below as of 2023 17:00  Patient On (Oxygen Delivery Method): nasal cannula  O2 Flow (L/min): 2    I&O's Summary    2023 07:01  -  2023 07:00  --------------------------------------------------------  IN: 100 mL / OUT: 0 mL / NET: 100 mL    2023 07:01  -  2023 20:51  --------------------------------------------------------  IN: 0 mL / OUT: 500 mL / NET: -500 mL        PHYSICAL EXAM:  GENERAL: NAD, well-developed, comfortable on nasal canula  HEAD:  Atraumatic, Normocephalic  EYES: EOMI, PERRLA, conjunctiva and sclera clear  NECK: Supple, No JVD  CHEST/LUNG: mild decrease breath sounds bilaterally; No wheeze   HEART: Regular rate and rhythm; No murmurs, rubs, or gallops  ABDOMEN: Soft, Nontender, Nondistended; Bowel sounds present  Neuro: AAOx3, no focal weakness, 5/5 b/l extremity strength  EXTREMITIES:  2+ Peripheral Pulses, No clubbing, cyanosis, trace b/l edema  SKIN: No rashes or lesions

## 2023-01-24 NOTE — PROGRESS NOTE ADULT - SUBJECTIVE AND OBJECTIVE BOX
Date of Service: 23 @ 14:44    Patient is a 82y old  Female who presents with a chief complaint of weakness (2023 10:06)      Any change in ROS:  doing  ok : no sob:  sitting in chair: feels weak     MEDICATIONS  (STANDING):  budesonide 160 MICROgram(s)/formoterol 4.5 MICROgram(s) Inhaler 2 Puff(s) Inhalation two times a day  chlorhexidine 2% Cloths 1 Application(s) Topical daily  cholecalciferol 2000 Unit(s) Oral daily  citalopram 10 milliGRAM(s) Oral daily  fluticasone propionate 50 MICROgram(s)/spray Nasal Spray 1 Spray(s) Both Nostrils two times a day  folic acid 1 milliGRAM(s) Oral daily  guaiFENesin  milliGRAM(s) Oral every 12 hours  influenza  Vaccine (HIGH DOSE) 0.7 milliLiter(s) IntraMuscular once  ipratropium    for Nebulization 500 MICROGram(s) Nebulizer every 6 hours  metoprolol tartrate 25 milliGRAM(s) Oral two times a day  mirtazapine 7.5 milliGRAM(s) Oral daily  pantoprazole    Tablet 40 milliGRAM(s) Oral before breakfast  polyethylene glycol 3350 17 Gram(s) Oral daily  predniSONE   Tablet 20 milliGRAM(s) Oral daily  senna 1 Tablet(s) Oral daily  sodium bicarbonate 650 milliGRAM(s) Oral three times a day  trimethoprim  160 mG/sulfamethoxazole 800 mG 1 Tablet(s) Oral daily    MEDICATIONS  (PRN):  acetaminophen     Tablet .. 650 milliGRAM(s) Oral every 6 hours PRN Temp greater or equal to 38C (100.4F), Mild Pain (1 - 3)  aluminum hydroxide/magnesium hydroxide/simethicone Suspension 30 milliLiter(s) Oral every 4 hours PRN Dyspepsia  melatonin 3 milliGRAM(s) Oral at bedtime PRN Insomnia  ondansetron Injectable 4 milliGRAM(s) IV Push every 8 hours PRN Nausea and/or Vomiting    Vital Signs Last 24 Hrs  T(C): 36.8 (2023 12:15), Max: 36.8 (2023 12:15)  T(F): 98.2 (2023 12:15), Max: 98.2 (2023 12:15)  HR: 93 (2023 12:15) (90 - 101)  BP: 109/68 (2023 12:15) (103/63 - 144/77)  BP(mean): --  RR: 18 (2023 12:15) (18 - 19)  SpO2: 93% (2023 12:15) (91% - 93%)    Parameters below as of 2023 12:15  Patient On (Oxygen Delivery Method): nasal cannula  O2 Flow (L/min): 3      I&O's Summary    2023 07:01  -  2023 07:00  --------------------------------------------------------  IN: 100 mL / OUT: 0 mL / NET: 100 mL    2023 07:01  -  2023 14:44  --------------------------------------------------------  IN: 0 mL / OUT: 500 mL / NET: -500 mL          Physical Exam:   GENERAL: NAD, well-groomed, well-developed  HEENT: RANJIT/   Atraumatic, Normocephalic  ENMT: No tonsillar erythema, exudates, or enlargement; Moist mucous membranes, Good dentition, No lesions  NECK: Supple, No JVD, Normal thyroid  CHEST/LUNG: Clear to auscultaion- o wheezing  CVS: Regular rate and rhythm; No murmurs, rubs, or gallops  GI: : Soft, Nontender, Nondistended; Bowel sounds present  NERVOUS SYSTEM:  Alert & Oriented X3  EXTREMITIES: - edema  LYMPH: No lymphadenopathy noted  SKIN: No rashes or lesions  ENDOCRINOLOGY: No Thyromegaly  PSYCH: Appropriate    Labs:  20, 20                            8.6    49.81 )-----------( 37       ( 2023 11:29 )             26.3                         9.6    44.17 )-----------( 34       ( 2023 07:13 )             29.7                         10.2   40.74 )-----------( 33       ( 2023 07:25 )             30.7         137  |  102  |  12  ----------------------------<  64<L>  4.0   |  20<L>  |  0.55      141  |  104  |  12  ----------------------------<  95  3.4<L>   |  21<L>  |  0.55      TPro  6.5  /  Alb  3.3  /  TBili  0.6  /  DBili  x   /  AST  20  /  ALT  5<L>  /  AlkPhos  63    TPro  5.8<L>  /  Alb  3.2<L>  /  TBili  0.5  /  DBili  0.2  /  AST  16  /  ALT  5<L>  /  AlkPhos  57      CAPILLARY BLOOD GLUCOSE              Urinalysis Basic - ( 2023 07:16 )    Color: Yellow / Appearance: Clear / S.025 / pH: x  Gluc: x / Ketone: Negative  / Bili: Negative / Urobili: Negative   Blood: x / Protein: 100 mg/dl / Nitrite: Negative   Leuk Esterase: Negative / RBC: 6 /hpf / WBC 2 /HPF   Sq Epi: x / Non Sq Epi: 2 /hpf / Bacteria: Negative      Procalcitonin, Serum: 0.16 ng/mL ( @ 07:22)        RECENT CULTURES:   @ 15:50 .Blood Blood-Peripheral                No Growth Final     @ 15:30 .Blood Blood-Peripheral     rad< from: CT Abdomen and Pelvis w/ IV Cont (23 @ 17:19) >  BOWEL: Moderate hiatal hernia. No bowel obstruction. Colonic   diverticulosis without acute diverticulitis. Stool is noted within the   large bowel.  PERITONEUM: No ascites.  RETROPERITONEUM/LYMPH NODES: No lymphadenopathy.  ABDOMINAL WALL: Within normal limits.  BONES: Multilevel degenerative changes of the spine and chronic appearing   compression fractures, most severe at the T12 vertebral body level.    IMPRESSION:  No pulmonary arterial embolism.    Status post wedge resections in both upper lobes. 2 cm nodular opacity   associated with the staple line in the right upper lobe is noted.   Etiology is unclear.    Multiple ill-defined opacities are noted within both lungs, more so in   the right lower lobe. Exact etiology is unclear.    Bilateral pleural effusions, right more than left.      --- End of Report ---      < end of copied text >             No Growth Final          RESPIRATORY CULTURES:          Studies  Chest X-RAY  CT SCAN Chest   Venous Dopplers: LE:   CT Abdomen  Others

## 2023-01-24 NOTE — PROGRESS NOTE ADULT - ATTENDING COMMENTS
Amendments to fellow's A/P as above.  Patient aware of our recommendations and in agreement.  Discussed with primary team  will follow

## 2023-01-24 NOTE — PROGRESS NOTE ADULT - ASSESSMENT
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 She presented here from [facility] for elevated WBC and low hemoglobin, severe weakness. Pt states for the past 2-3 days has been having increased weakness and lethargy, decreased appetite. Reports cough, currently nonproductive, no sob, no difficulty breathing. No abdominal pain, nausea, vomiting, no bloody stools. Has endorsed multiple episodes of loose stools x weeks, currently being treated for c.diff with oral vancomycin.     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  - Pulmonary, Rheumatology and Heme/Onc following    -noted plan for induction therapy to treat vasculitis, s/p BMBx 1/23  Leukocytosis -- unclear etiology - no clear infectious etiology found to date  ?reactive in setting of above and also on steroids   H/o DLBCL   - RVP/COVID negative, has chronic cough-unchanged    - L chest port without sign of infection, no s/o SSTI   - overall exam without focal findings, abd benign   - CT no PE, s/p wedge resections in b/l upper lobes, 2 cm nodular opacity a/w staple line in RUL-unclear etiology; multiple ill-defined opacities in both lungs more in RLL -unclear etiology, b/l effusions R>L, clinically no sign of pneumonia noted    - Prior hx/chart reviewed; cultures were negative at that time    - Bcx here negative x2   Recommendations:  Continue on Bactrim DS 1 tablet daily for PCP ppx while on prednisone  Continue to monitor off abx other than above given pt afebrile, cx negative and no infectious source found  Monitor temps/CBC   - if spikes fever, repeat CXR, blood cultures x2 and can start empiric cefepime       Yevgeniy Malave M.D.  [facility], Division of Infectious Diseases  [phone]  After 5pm on weekdays and all day on weekends - please call [phone]

## 2023-01-24 NOTE — PROGRESS NOTE ADULT - SUBJECTIVE AND OBJECTIVE BOX
BETTIE NGUYEN  [id number]    INTERVAL HPI/OVERNIGHT EVENTS:  Patient remains stable but not able to wean off from 3L of ncO2. Bone marrow bx result still pending. Patient signed the consent and agreed w/RTX infusion.       PMHx/PSHx/FamHx/SocHx reviewed and no significant changes    REVIEW OF SYSTEMS   - reviewed with patient and  negative other than as above or previously documented.     MEDICATIONS  (STANDING):  budesonide 160 MICROgram(s)/formoterol 4.5 MICROgram(s) Inhaler 2 Puff(s) Inhalation two times a day  chlorhexidine 2% Cloths 1 Application(s) Topical daily  cholecalciferol 2000 Unit(s) Oral daily  citalopram 10 milliGRAM(s) Oral daily  fluticasone propionate 50 MICROgram(s)/spray Nasal Spray 1 Spray(s) Both Nostrils two times a day  folic acid 1 milliGRAM(s) Oral daily  guaiFENesin  milliGRAM(s) Oral every 12 hours  influenza  Vaccine (HIGH DOSE) 0.7 milliLiter(s) IntraMuscular once  ipratropium    for Nebulization 500 MICROGram(s) Nebulizer every 6 hours  metoprolol tartrate 25 milliGRAM(s) Oral two times a day  mirtazapine 7.5 milliGRAM(s) Oral daily  pantoprazole    Tablet 40 milliGRAM(s) Oral before breakfast  polyethylene glycol 3350 17 Gram(s) Oral daily  predniSONE   Tablet 20 milliGRAM(s) Oral daily  senna 1 Tablet(s) Oral daily  sodium bicarbonate 650 milliGRAM(s) Oral three times a day  trimethoprim  160 mG/sulfamethoxazole 800 mG 1 Tablet(s) Oral daily    MEDICATIONS  (PRN):  acetaminophen     Tablet .. 650 milliGRAM(s) Oral every 6 hours PRN Temp greater or equal to 38C (100.4F), Mild Pain (1 - 3)  aluminum hydroxide/magnesium hydroxide/simethicone Suspension 30 milliLiter(s) Oral every 4 hours PRN Dyspepsia  melatonin 3 milliGRAM(s) Oral at bedtime PRN Insomnia  ondansetron Injectable 4 milliGRAM(s) IV Push every 8 hours PRN Nausea and/or Vomiting      Allergies    codeine (Nausea)  doxycycline (Nausea)  penicillin (Hives)    Intolerances          Vital Signs Last 24 Hrs  T(C): 36.8 (2023 12:15), Max: 36.8 (2023 12:15)  T(F): 98.2 (2023 12:15), Max: 98.2 (2023 12:15)  HR: 95 (2023 17:00) (90 - 95)  BP: 122/65 (2023 17:00) (103/63 - 144/77)  BP(mean): --  RR: 17 (2023 17:00) (17 - 19)  SpO2: 95% (2023 17:00) (91% - 95%)    Parameters below as of 2023 17:00  Patient On (Oxygen Delivery Method): nasal cannula  O2 Flow (L/min): 2      Physical Exam:  General: No apparent distress  HEENT: EOMI, MMM  CVS: +S1/S2, RRR, no murmurs/rubs/gallops, Chemo-port  Resp: On 3L of ncO2. Hypoventilation in the L>R  GI: Soft, NT/ND +BS  MSK:   MSK: no synovitis, joints NTP    tongue: midline, no deviation   neck flexion/extension: 5/5   deltoid: 5/5 B/L  biceps/triceps: 5/5 B/L  Hand/: 5/5 B/L  Hip flexion/extension: 5/5 B/L  knee flexion/extension: 5/5 B/L    dorsiflexion: 3/5 L, 5/5 B/L  plantar flex: 5/5 B/L  Neuro: AAOx3  Skin: non blanching multiple purpuric lesions in the lower extremities, arms, and chest       LABS:                        8.6    49.81 )-----------( 37       ( 2023 11:29 )             26.3             Urinalysis Basic - ( 2023 07:16 )    Color: Yellow / Appearance: Clear / S.025 / pH: x  Gluc: x / Ketone: Negative  / Bili: Negative / Urobili: Negative   Blood: x / Protein: 100 mg/dl / Nitrite: Negative   Leuk Esterase: Negative / RBC: 6 /hpf / WBC 2 /HPF   Sq Epi: x / Non Sq Epi: 2 /hpf / Bacteria: Negative          RADIOLOGY & ADDITIONAL TESTS:       Take over the counter pain medication

## 2023-01-24 NOTE — PROGRESS NOTE ADULT - ASSESSMENT
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 Patient was sent to Rehab. Subsequently on 1/18 patient is readmitted for productive cough and general weakness w/hb 5. Rheumatology was consulted for further help in management.    #AAV-MPO  -Patient w/hx of mononeuritis multiplex, kidney  and lung involvement,  -Previous treatment with RTX, transitioned to AZA and off any therapy while receiving R-CHOP   -CT chest IN 11/2022 Increased mediastinal lymphadenopathy. A reference low right paratracheal node measures 2.4 x 1.8 cm (2, 39), interval increase in size from prior when it measured 2.0 x 1.2 cm. -11/10/22 R wedge resection showed capillaritis with fibrin, giant cells and inflammation around small and intermediate sized vessels. Elastic stain showed vasculocentricity of the inflammation. Lymph node showed:  Focal giant cells and focal necrosis are seen in the lymph node as well   -CT chest on this admission showed multiple ill-defined opacities are noted within both lungs, more so in the right lower lobe. Exact etiology is unclear. Bilateral pleural effusions, right more than left.  -On exam: weakness of dorsiflexion of left foot, petechial lesions of UE and LE (?secondary to thrombocytopenia)    -On prednisone 20 mg daily   -Concerning for reactivation of AAV-MPO  -Plan for RTX, waiting for Bone marrow bx result (consent was obtained and it is in the chart)    #Bicytopenia   -Admitted w/H/H 5.9/18.2, platelet 68 requiring 1 U PRBC  -Denied hemoptysis, melena, hematochezia, and hematuria  -Etiology is unclear and plan for bone marrow bx per Hem/Onc w/underlying hx of Large B cell lymphoma     -Pending bone marrow bx    #C.diff  Patient endorsed diarrhea stopped     #Leukocytosis  WBC 49 on admission     Plan  -Patient  on 2-3L of ncO2 w/o sign of hemoptysis. W/high risk of infection and plan for bone marrow biopsy, we will hold on pulse of steroid.   -CW 20 mg prednisone  -D/w outpatient Hem/Onc Dr Madrigal and agreed with RTX   -We will discuss with radiologist the CT chest finding   -pending immunoglobulin levels and T cell subsets   -Bone marrow bx result pending  -Repeat QuantiFeron, and hepatitis in anticipation of immunosuppressive therapy   -Recommend PCP prophylaxis and PPI   -ID is not recommending abx       DW Dr. 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 Patient was sent to Rehab. Subsequently on 1/18 patient is readmitted for productive cough and general weakness w/hb 5. Rheumatology was consulted for further help in management.    #AAV-MPO  -Patient w/hx of mononeuritis multiplex, kidney  and lung involvement,  -Previous treatment with RTX, transitioned to AZA and off any therapy while receiving R-CHOP   -CT chest IN 11/2022 Increased mediastinal lymphadenopathy. A reference low right paratracheal node measures 2.4 x 1.8 cm (2, 39), interval increase in size from prior when it measured 2.0 x 1.2 cm. -11/10/22 R wedge resection showed capillaritis with fibrin, giant cells and inflammation around small and intermediate sized vessels. Elastic stain showed vasculocentricity of the inflammation. Lymph node showed:  Focal giant cells and focal necrosis are seen in the lymph node as well   -CT chest on this admission showed multiple ill-defined opacities are noted within both lungs, more so in the right lower lobe. Exact etiology is unclear. Bilateral pleural effusions, right more than left.  -On exam: weakness of dorsiflexion of left foot, petechial lesions of UE and LE (?secondary to thrombocytopenia)    -On prednisone 20 mg daily   -Concerning for reactivation of AAV-MPO  -Plan for RTX, waiting for Bone marrow bx result (consent was obtained and it is in the chart)    #Bicytopenia   -Admitted w/H/H 5.9/18.2, platelet 68 requiring 1 U PRBC  -Denied hemoptysis, melena, hematochezia, and hematuria  -Etiology is unclear and plan for bone marrow bx per Hem/Onc w/underlying hx of Large B cell lymphoma     -Pending bone marrow bx    #C.diff- resolved     #Leukocytosis  WBC 49 on admission     Plan  -Patient  on 2-3L of ncO2 w/o sign of hemoptysis. W/high risk of infection and plan for bone marrow biopsy, we will hold on pulse of steroid.   -CW 20 mg prednisone  -D/w outpatient Hem/Onc Dr Madrigal and agreed with RTX   -pending immunoglobulin levels and T cell subsets   -Bone marrow bx result pending  -Repeat QuantiFeron, and hepatitis in anticipation of immunosuppressive therapy   -Recommend PCP prophylaxis and PPI   -ID is not recommending abx       DW Dr. Aguila Devine MD  PGY-4 Rheumatology Fellow  Pager: [phone]   Available in teams

## 2023-01-24 NOTE — PROGRESS NOTE ADULT - SUBJECTIVE AND OBJECTIVE BOX
[facility] INFECTIOUS DISEASES  ANDREW Rodríguez Y. Patel, S. Shah, G. Casimir  [phone]  ([phone] - weekdays after 5pm and weekends)    Name: BETTIE NGUYEN  Age/Gender: 82y Female  MRN: [id number]    Interval History:  Patient seen and examined this morning.   States she feels better, has no complaints.   Had BM yesterday, no diarrhea, n/v, abd pain.   Denies fever, change in cough, dyspnea, chest pain.  Notes reviewed. Afebrile.     Allergies: codeine (Nausea)  doxycycline (Nausea)  penicillin (Hives)      Objective:  Vitals:   T(F): 98.1 (23 @ 05:06), Max: 98.1 (23 @ 05:06)  HR: 93 (23 @ 05:06) (90 - 101)  BP: 144/77 (23 @ 05:06) (103/63 - 144/77)  RR: 18 (23 @ 05:06) (18 - 19)  SpO2: 93% (23 @ 05:06) (91% - 93%)  Physical Examination:  General: no acute distress  HEENT: NC/AT, EOMI, anicteric, neck supple  Cardio: S1, S2 present, normal rate  Resp: decreased breath sounds b/l  Abd: soft, NT, ND, + BS  Neuro: AAOx3, no obvious focal deficits  Ext: no edema, no cyanosis, moving extremities  Skin: warm, dry, no visible rash  Psych: appropriate affect and mood for situation  Lines: L chest port accessed, no erythema/TTP    Laboratory Studies:  CBC:                       8.6    49.81 )-----------( 37       ( 2023 11:29 )             26.3     WBC Trend:  49.81 23 @ 11:29  44.17 23 @ 07:13  40.74 23 @ 07:25  46.62 23 @ 07:13  42.59 23 @ 11:20  39.93 23 @ 22:47  45.18 23 @ 16:08    CMP:   Creatinine, Serum: 0.55 mg/dL (23 @ 07:11)  Creatinine, Serum: 0.55 mg/dL (23 @ 07:22)  Creatinine, Serum: 0.67 mg/dL (23 @ 07:14)  Creatinine, Serum: 0.57 mg/dL (23 @ 11:20)  Creatinine, Serum: 0.69 mg/dL (23 @ 16:08)    Urinalysis Basic - ( 2023 07:16 )  Color: Yellow / Appearance: Clear / S.025 / pH: x  Gluc: x / Ketone: Negative  / Bili: Negative / Urobili: Negative   Blood: x / Protein: 100 mg/dl / Nitrite: Negative   Leuk Esterase: Negative / RBC: 6 /hpf / WBC 2 /HPF   Sq Epi: x / Non Sq Epi: 2 /hpf / Bacteria: Negative    Microbiology: reviewed   Culture - Blood (collected 23 @ 15:50)  Source: .Blood Blood-Peripheral  Final Report (23 @ 23:01):    No Growth Final    Culture - Blood (collected 23 @ 15:30)  Source: .Blood Blood-Peripheral  Final Report (23 @ 23:01):    No Growth Final    Radiology: reviewed     Medications:  acetaminophen     Tablet .. 650 milliGRAM(s) Oral every 6 hours PRN  aluminum hydroxide/magnesium hydroxide/simethicone Suspension 30 milliLiter(s) Oral every 4 hours PRN  budesonide 160 MICROgram(s)/formoterol 4.5 MICROgram(s) Inhaler 2 Puff(s) Inhalation two times a day  chlorhexidine 2% Cloths 1 Application(s) Topical daily  cholecalciferol 2000 Unit(s) Oral daily  citalopram 10 milliGRAM(s) Oral daily  fluticasone propionate 50 MICROgram(s)/spray Nasal Spray 1 Spray(s) Both Nostrils two times a day  folic acid 1 milliGRAM(s) Oral daily  guaiFENesin  milliGRAM(s) Oral every 12 hours  influenza  Vaccine (HIGH DOSE) 0.7 milliLiter(s) IntraMuscular once  ipratropium    for Nebulization 500 MICROGram(s) Nebulizer every 6 hours  melatonin 3 milliGRAM(s) Oral at bedtime PRN  metoprolol tartrate 25 milliGRAM(s) Oral two times a day  mirtazapine 7.5 milliGRAM(s) Oral daily  ondansetron Injectable 4 milliGRAM(s) IV Push every 8 hours PRN  pantoprazole    Tablet 40 milliGRAM(s) Oral before breakfast  polyethylene glycol 3350 17 Gram(s) Oral daily  predniSONE   Tablet 20 milliGRAM(s) Oral daily  senna 1 Tablet(s) Oral daily  sodium bicarbonate 650 milliGRAM(s) Oral three times a day  trimethoprim  160 mG/sulfamethoxazole 800 mG 1 Tablet(s) Oral daily    Current Antimicrobials:  trimethoprim  160 mG/sulfamethoxazole 800 mG 1 Tablet(s) Oral daily    Prior/Completed Antimicrobials:  cefepime   IVPB

## 2023-01-24 NOTE — PROGRESS NOTE ADULT - ASSESSMENT
82 yr old female with a PMHx of diffuse large B cell lymphoma in remission, non-hodgkin's lymphoma (chemoport), depression, HLD, GERD, PE/DVT (4/2021 no longer on Eliquis), ANCA-vasculitis (20 y/a, no meds), s/p LVATS, LUIS wedge resection (2021), recent direct admit for RVATS, RUL Nodule Biopsy w/ IR marking in [facility], path favoring vasculitis, treated with Decadron, then switched to PO prednisone, went to [facility]. She presented here from [facility] for elevated WBC and low hemoglobin, severe weakness. Pt states for the past 2-3 days has been having increased weakness and lethargy, decreased appetite. +sputum productive cough. + cui, no sob, no difficulty breathing. No abdominal pain, nausea, vomiting, no bloody stools. Has endorsed multiple episodes of loose stools x weeks, currently being treated for c.diff with oral vancomycin.       Fatigue/dyspnea: likely due to severe anemia  - pt s/p total 2 units PRBC   - no melena, no hematochezia. no BRBPR. occult stool neg.   - repeat post transfusion hgb stable.  - likely poor appetite with iron deficiency vs. r/o other hematological issues  - no clear signs of infection, monitor off abx.   - monitor for fluid overload, she tends to require IV Lasix intermittently post transfusion.  - IV Lasix 20 mg x 1 for basilar crackles post transfusion. stable.   - iron studies (though recent transfusion likely affect results), vit b12, D, folic, TSH.   - resume diet. doubt GI bleed   - Physical therapy. Out of bed to chair with assistance.   - House heme/onc consulted for pancytopenia with elevated WBC. s/p bone marrow bx 1/23/23. path pending  - reconsulted Rheum for follow up treatment of vasculitis  (she is chronically on steroids).   - ID consulted to see if she may need PCP ppx regimen on long term steroids. Started Bactrim pcp ppx.    Diarrhea  - elevated WBC  - no documented c.diff PCR, re-ordered.  - s/p Vanco PO a few days (started in rehab)  - diarrhea completely resolved.   - monitor off PO vanco per ID  - now constipated. PRN bowel regimen started. +BM    Pulmonary vasculitis   - Recent TTE on 11/3/22 reviewed: normal LV. outpt card Dr. Ady Cooper  - outpt pulm Mack Tucker   - s/p thoracoscopic biopsy of mediastinal lymph node and Lung wedge resection 11/10/22  - recent pleural effusion s/p 650 cc U/S-guided rt thoracentesis 11/17/22  - exudative but no neutrophilic predominance and initial Gram stain w/o organisms  - cultures neg.   - path results favoring vasculitis: no evidence for lymphoma. Cytology also came back negative.   - comfortable on nasal canula, better post diuretic last admission.   - rheum and heme-onc following previously, will reconsult.   - last admission, she was not started on immunosuppressants such as cellcept given recent treatment for COVID19 at that time  - c/w prednisone 20 mg qdaily. consider rheum consult interms of taper regimen.   - ID started PCP ppx with Bactrim.     hx of Tachycardia    - cont low-dose metoprolol, 50 mg to 25 mg dose reduced in rehab.   - card consult (Dr. Hooker) in the past, if needed    Anxiety and depression  - stable, continue citalopram and Remeron.  - Pt denied SI/HI ideations, denied visual and auditory hallucinations.     GERD (gastroesophageal reflux disease)  - continue PPI    Hyperlipidemia.   - continue home ezetimibe. okay to hold if nonformulary   - Lipid panel    DVT ppx   - holding AC in the setting of anemia, pancytopenia.   - will start Lovenox SQ if pancytopenia/ plts recovers.

## 2023-01-24 NOTE — PROGRESS NOTE ADULT - NSPROGADDITIONALINFOA_GEN_ALL_CORE
d/w pt and NP.    Roge Cooper will be covering for the pt starting 1/25/23. He can be reached at  if needed.     - [facility]  - [phone]

## 2023-01-24 NOTE — PROGRESS NOTE ADULT - ASSESSMENT
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 She presented here from [facility] for elevated WBC and low hemoglobin, severe weakness. Pt states for the past 2-3 days has been having increased weakness and lethargy, decreased appetite. +sputum productive cough. + cui, no sob, no difficulty breathing. No abdominal pain, nausea, vomiting, no bloody stools. Has endorsed multiple episodes of loose stools x weeks, currently being treated for c.diff with oral vancomycin.       Fatigue/dyspnea: likely due to severe anemia :  pt s/p 1 unit PRB  Diarrhea  Pulmonary vasculitis  :  hx of Tachycardia :  Recent TTE on 11/3/22 reviewed: normal LV. outpt card Dr. Ady Cooper  Anxiety and depression  GERD (gastroesophageal reflux disease)  Hyperlipidemia.   DVT ppx     1/20:  Fatigue/dyspnea: likely due to severe anemia :  pt s/p 1 unit PRBC: hb now  > 10  : she is on 2 L of oxygen : no wheezing: no overt signs of bleeding : ct chest is abnormal report noted:  has jean-claude ill defined opacities of unclear etiology  : venous ph is mild acidotic:  no need for Bipap at this time : monitor and trend hb  Diarrhea : symptomatic rx:  workup per primary team  Pulmonary vasculitis  : per rheumatology  : had recent vats surgery : LN biopsy noted:   hx of Tachycardia :  Recent TTE on 11/3/22 reviewed: normal LV. outpt card Dr. Ady Cooper  Anxiety and depression  GERD (gastroesophageal reflux disease) : ppi   Hyperlipidemia.   recent covid  : o n last admission she was treated for covid infection:   DVT ppx   d wteam    1/22:    Fatigue/dyspnea: likely due to severe anemia :  pt s/p 1 unit PRBC: hb now  > 10  : she is on 2 L of oxygen : no wheezing: no overt signs of bleeding : ct chest is abnormal report noted:  has jean-claude ill defined opacities of unclear etiology  : venous ph is mild acidotic:  no need for Bipap at this time : monitor and trend hb: she remained stable o gracie last 1 days:  she is not on any antibtiocs at this time: RVP  and blood cultures are negative: she had ebus biopsy also before: reviewed: ID following  Diarrhea : symptomatic rx:  workup per primary team : seems to have resolved  Pulmonary vasculitis  : per rheumatology  : had recent vats surgery : LN biopsy noted:   Bicytopneia and leucocytosis: ? etiology  : for possible bone marrow biopsy on Monday    hx of Tachycardia :  Recent TTE on 11/3/22 reviewed: normal LV. outpt card Dr. Ady Cooper  Anxiety and depression  GERD (gastroesophageal reflux disease) : ppi   Hyperlipidemia.   recent covid  : on last admission she was treated for covid infection:   DVT ppx   dw team    1/23:    Fatigue/dyspnea: likely due to severe anemia : feels weak  but no bleeding noted:  on 2 L of oxygen : she needs PT OT   Diarrhea :resolved:   Pulmonary vasculitis  : per rheumatology  : had recent vats surgery : LN biopsy noted: : she never received teat ment for vasculitis except low dose steroids:  she is awaiting bone marrow biopsy prior to induction therapy with rituximab: The ct chest done on this admission does not show pneumothorax and has jean-claude effusions:  There are some new opacites in rigth lower lobe which were not therre:  clinically she does not seem to have pneumonia: ID following: could it be a prt of vasculitis  Bicytopneia and leucocytosis: ? etiology  : for possible bone marrow biopsy today  hx of Tachycardia :  Recent TTE on 11/3/22 reviewed: normal LV. outpt card Dr. Ady Cooper  Anxiety and depression  GERD (gastroesophageal reflux disease) : ppi   Hyperlipidemia.   recent covid  : on last admission she was treated for covid infection:   DVT ppx   dw team    1/24:    Fatigue/dyspnea: likely due to severe anemia : feels weak  but no bleeding noted:  on 2 L of oxygen : she needs PT OT   Diarrhea :resolved:   Pulmonary vasculitis  : per rheumatology  : had recent vats surgery : LN biopsy noted: : she never received teat ment for vasculitis except low dose steroids:  she is awaiting bone marrow biopsy prior to induction therapy with rituximab: The ct chest done on this admission does not show pneumothorax and has jean-claude effusions:  There are some new opacites in rigth lower lobe which were not therre:  clinically she does not seem to have pneumonia: ID following: could it be a prt of vasculitis  Bicytopneia and leucocytosis: BM biopsy done: await results for eventual immunosuppressives therapy:   hx of Tachycardia :  Recent TTE on 11/3/22 reviewed: normal LV. outpt card Dr. Ady Cooper  Anxiety and depression  GERD (gastroesophageal reflux disease) : ppi   Hyperlipidemia.   recent covid  : on last admission she was treated for covid infection:   DVT ppx   dw team

## 2023-01-25 NOTE — PROGRESS NOTE ADULT - ATTENDING COMMENTS
Amendments to fellow's A/P as above.  Awaiting BM biopsy prelim results to discuss with hematology next steps in management  Patient aware of our recommendations and in agreement.  Discussed with primary team  will follow

## 2023-01-25 NOTE — PROGRESS NOTE ADULT - ASSESSMENT
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 She presented here from [facility] for elevated WBC and low hemoglobin, severe weakness. Pt states for the past 2-3 days has been having increased weakness and lethargy, decreased appetite. +sputum productive cough. + cui, no sob, no difficulty breathing. No abdominal pain, nausea, vomiting, no bloody stools. Has endorsed multiple episodes of loose stools x weeks, currently being treated for c.diff with oral vancomycin.       Fatigue/dyspnea: likely due to severe anemia :  pt s/p 1 unit PRB  Diarrhea  Pulmonary vasculitis  :  hx of Tachycardia :  Recent TTE on 11/3/22 reviewed: normal LV. outpt card Dr. Ady Cooper  Anxiety and depression  GERD (gastroesophageal reflux disease)  Hyperlipidemia.   DVT ppx     1/20:  Fatigue/dyspnea: likely due to severe anemia :  pt s/p 1 unit PRBC: hb now  > 10  : she is on 2 L of oxygen : no wheezing: no overt signs of bleeding : ct chest is abnormal report noted:  has jean-claude ill defined opacities of unclear etiology  : venous ph is mild acidotic:  no need for Bipap at this time : monitor and trend hb  Diarrhea : symptomatic rx:  workup per primary team  Pulmonary vasculitis  : per rheumatology  : had recent vats surgery : LN biopsy noted:   hx of Tachycardia :  Recent TTE on 11/3/22 reviewed: normal LV. outpt card Dr. Ady Cooper  Anxiety and depression  GERD (gastroesophageal reflux disease) : ppi   Hyperlipidemia.   recent covid  : o n last admission she was treated for covid infection:   DVT ppx   d wteam    1/22:    Fatigue/dyspnea: likely due to severe anemia :  pt s/p 1 unit PRBC: hb now  > 10  : she is on 2 L of oxygen : no wheezing: no overt signs of bleeding : ct chest is abnormal report noted:  has jean-claude ill defined opacities of unclear etiology  : venous ph is mild acidotic:  no need for Bipap at this time : monitor and trend hb: she remained stable o gracie last 1 days:  she is not on any antibtiocs at this time: RVP  and blood cultures are negative: she had ebus biopsy also before: reviewed: ID following  Diarrhea : symptomatic rx:  workup per primary team : seems to have resolved  Pulmonary vasculitis  : per rheumatology  : had recent vats surgery : LN biopsy noted:   Bicytopneia and leucocytosis: ? etiology  : for possible bone marrow biopsy on Monday    hx of Tachycardia :  Recent TTE on 11/3/22 reviewed: normal LV. outpt card Dr. Ady Cooper  Anxiety and depression  GERD (gastroesophageal reflux disease) : ppi   Hyperlipidemia.   recent covid  : on last admission she was treated for covid infection:   DVT ppx   dw team    1/23:    Fatigue/dyspnea: likely due to severe anemia : feels weak  but no bleeding noted:  on 2 L of oxygen : she needs PT OT   Diarrhea :resolved:   Pulmonary vasculitis  : per rheumatology  : had recent vats surgery : LN biopsy noted: : she never received teat ment for vasculitis except low dose steroids:  she is awaiting bone marrow biopsy prior to induction therapy with rituximab: The ct chest done on this admission does not show pneumothorax and has jean-claude effusions:  There are some new opacites in rigth lower lobe which were not therre:  clinically she does not seem to have pneumonia: ID following: could it be a prt of vasculitis  Bicytopneia and leucocytosis: ? etiology  : for possible bone marrow biopsy today  hx of Tachycardia :  Recent TTE on 11/3/22 reviewed: normal LV. outpt card Dr. Ady Cooper  Anxiety and depression  GERD (gastroesophageal reflux disease) : ppi   Hyperlipidemia.   recent covid  : on last admission she was treated for covid infection:   DVT ppx   dw team    1/24:    Fatigue/dyspnea: likely due to severe anemia : feels weak  but no bleeding noted:  on 2 L of oxygen : she needs PT OT   Diarrhea :resolved:   Pulmonary vasculitis  : per rheumatology  : had recent vats surgery : LN biopsy noted: : she never received teat ment for vasculitis except low dose steroids:  she is awaiting bone marrow biopsy prior to induction therapy with rituximab: The ct chest done on this admission does not show pneumothorax and has jean-claude effusions:  There are some new opacites in rigth lower lobe which were not therre:  clinically she does not seem to have pneumonia: ID following: could it be a prt of vasculitis  Bicytopneia and leucocytosis: BM biopsy done: await results for eventual immunosuppressives therapy:   hx of Tachycardia :  Recent TTE on 11/3/22 reviewed: normal LV. outpt card Dr. Ady Cooper  Anxiety and depression  GERD (gastroesophageal reflux disease) : ppi   Hyperlipidemia.   recent covid  : on last admission she was treated for covid infection:   DVT ppx   dw team    1/25:    Fatigue/dyspnea: likely due to severe anemia : feels weak  but no bleeding noted:  on 2 L of oxygen : she needs PT OT   Diarrhea :resolved:   Pulmonary vasculitis  : per rheumatology  : had recent vats surgery : LN biopsy noted: : she never received teat ment for vasculitis except low dose steroids:  she is awaiting bone marrow biopsy prior to induction therapy with rituximab: The ct chest done on this admission does not show pneumothorax and has jean-claude effusions:  There are some new opacites in rigth lower lobe which were not there:  clinically she does not seem to have pneumonia: ID following: could it be a part of vasculitis : her resp status has not changed   Bicytopneia and leucocytosis: BM biopsy done: await results for still pending   hx of Tachycardia :  Recent TTE on 11/3/22 reviewed: normal LV. outpt card Dr. Ady Cooper  Anxiety and depression  GERD (gastroesophageal reflux disease) : ppi   Hyperlipidemia.   recent covid  : on last admission she was treated for covid infection:   DVT ppx   dw team: awaiting decision on immunosuppression

## 2023-01-25 NOTE — PROGRESS NOTE ADULT - ASSESSMENT
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 She presented here from [facility] for elevated WBC and low hemoglobin, severe weakness. Pt states for the past 2-3 days has been having increased weakness and lethargy, decreased appetite. Reports cough, currently nonproductive, no sob, no difficulty breathing. No abdominal pain, nausea, vomiting, no bloody stools. Has endorsed multiple episodes of loose stools x weeks, currently being treated for c.diff with oral vancomycin.     Fatigue/dyspnea likely due to severe anemia s/p transfusions  Diarrhea - recently tried marijuana to help with appetite and noted worsening    - off po vancomycin given no diarrhea, unable to send sample   - diarrhea resolved and became constipated, now having soft-formed BMs   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  Leukocytosis -- unclear etiology - no clear infectious etiology found to date  ?reactive in setting of above and also on steroids   H/o DLBCL, EBV+   - Pulmonary, Rheumatology and Heme/Onc following   - noted patient had similar presentation with bicytopenia and leukocytosis and may need to be treated with rituximab   - s/p BMBx 1/23-path pending   - RVP/COVID negative, has chronic cough-unchanged    - L chest port without sign of infection, no s/o SSTI, no focal findings on exam   - CT - s/p wedge resections in b/l upper lobes, 2 cm nodular opacity a/w staple line in RUL-unclear etiology; multiple ill-defined opacities in both lungs more in RLL -unclear etiology, b/l effusions R>L   - clinically no sign of pneumonia noted   - Prior hx/chart reviewed; cultures were negative at that time    - Bcx here negative x2    Recommendations:  Continue on Bactrim DS 1 tablet daily for PCP ppx while on prednisone  Continue to monitor off abx other than above given pt afebrile, cx negative and no infectious source found  Monitor temps/CBC   - if spikes fever, repeat CXR, blood cultures x2 and can start empiric cefepime       Yevgeniy Malave M.D.  [facility], Division of Infectious Diseases  [phone]  After 5pm on weekdays and all day on weekends - please call [phone]

## 2023-01-25 NOTE — PROGRESS NOTE ADULT - SUBJECTIVE AND OBJECTIVE BOX
[facility] DIVISION OF INFECTIOUS DISEASES  ANDREW Rodríguez Y. Patel, S. Shah, G. Bebeto  [phone]  ([phone] - weekdays after 5pm and weekends)    Name: BETTIE NGUYEN  Age/Gender: 82y Female  MRN: [id number]    Interval History:  Patient seen and examined this morning.   States she feels well, no complaints at this time  Had a BM last night and this am, soft-formed stools.  Notes reviewed. Afebrile.     Allergies: codeine (Nausea)  doxycycline (Nausea)  penicillin (Hives)    Objective:  Vitals:   T(F): 98 (01-25-23 @ 05:24), Max: 98.3 (01-24-23 @ 21:30)  HR: 99 (01-25-23 @ 05:24) (80 - 99)  BP: 131/72 (01-25-23 @ 05:24) (106/60 - 131/72)  RR: 18 (01-25-23 @ 05:24) (17 - 18)  SpO2: 91% (01-25-23 @ 05:24) (91% - 95%)  Physical Examination:  General: no acute distress, nontoxic appearing  HEENT: NC/AT, EOMI, anicteric, neck supple  Cardio: S1, S2 present, normal rate  Resp: clear to auscultation bilaterally  Abd: soft, NT, ND, + BS  Neuro: AAOx3, no obvious focal deficits  Ext: no edema, no cyanosis, moving extremities  Skin: warm, dry, no visible rash  Psych: appropriate affect and mood for situation  Lines: PIV, L chest port accessed, no erythema/TTP    Laboratory Studies:  CBC:                       8.6    48.45 )-----------( 35       ( 25 Jan 2023 07:18 )             26.9     WBC Trend:  48.45 01-25-23 @ 07:18  49.81 01-24-23 @ 11:29  44.17 01-22-23 @ 07:13  40.74 01-21-23 @ 07:25  46.62 01-20-23 @ 07:13  42.59 01-19-23 @ 11:20  39.93 01-18-23 @ 22:47  45.18 01-18-23 @ 16:08    CMP: 01-25    138  |  102  |  15  ----------------------------<  53<LL>  4.6   |  21<L>  |  0.69    Ca    9.0      25 Jan 2023 07:18    TPro  6.3  /  Alb  3.4  /  TBili  0.5  /  DBili  x   /  AST  20  /  ALT  6<L>  /  AlkPhos  51  01-25    Creatinine, Serum: 0.69 mg/dL (01-25-23 @ 07:18)  Creatinine, Serum: 0.55 mg/dL (01-22-23 @ 07:11)  Creatinine, Serum: 0.55 mg/dL (01-21-23 @ 07:22)  Creatinine, Serum: 0.67 mg/dL (01-20-23 @ 07:14)  Creatinine, Serum: 0.57 mg/dL (01-19-23 @ 11:20)  Creatinine, Serum: 0.69 mg/dL (01-18-23 @ 16:08)    LIVER FUNCTIONS - ( 25 Jan 2023 07:18 )  Alb: 3.4 g/dL / Pro: 6.3 g/dL / ALK PHOS: 51 U/L / ALT: 6 U/L / AST: 20 U/L / GGT: x           Microbiology: reviewed   Culture - Blood (collected 01-18-23 @ 15:50)  Source: .Blood Blood-Peripheral  Final Report (01-23-23 @ 23:01):    No Growth Final    Culture - Blood (collected 01-18-23 @ 15:30)  Source: .Blood Blood-Peripheral  Final Report (01-23-23 @ 23:01):    No Growth Final    Radiology: reviewed     Medications:  acetaminophen     Tablet .. 650 milliGRAM(s) Oral every 6 hours PRN  aluminum hydroxide/magnesium hydroxide/simethicone Suspension 30 milliLiter(s) Oral every 4 hours PRN  budesonide 160 MICROgram(s)/formoterol 4.5 MICROgram(s) Inhaler 2 Puff(s) Inhalation two times a day  chlorhexidine 2% Cloths 1 Application(s) Topical daily  cholecalciferol 2000 Unit(s) Oral daily  citalopram 10 milliGRAM(s) Oral daily  fluticasone propionate 50 MICROgram(s)/spray Nasal Spray 1 Spray(s) Both Nostrils two times a day  folic acid 1 milliGRAM(s) Oral daily  guaiFENesin  milliGRAM(s) Oral every 12 hours  influenza  Vaccine (HIGH DOSE) 0.7 milliLiter(s) IntraMuscular once  ipratropium    for Nebulization 500 MICROGram(s) Nebulizer every 6 hours  melatonin 3 milliGRAM(s) Oral at bedtime PRN  metoprolol tartrate 25 milliGRAM(s) Oral two times a day  mirtazapine 7.5 milliGRAM(s) Oral daily  ondansetron Injectable 4 milliGRAM(s) IV Push every 8 hours PRN  pantoprazole    Tablet 40 milliGRAM(s) Oral before breakfast  polyethylene glycol 3350 17 Gram(s) Oral daily PRN  predniSONE   Tablet 20 milliGRAM(s) Oral daily  senna 1 Tablet(s) Oral daily  sodium bicarbonate 650 milliGRAM(s) Oral three times a day  trimethoprim  160 mG/sulfamethoxazole 800 mG 1 Tablet(s) Oral daily    Current Antimicrobials:  trimethoprim  160 mG/sulfamethoxazole 800 mG 1 Tablet(s) Oral daily    Prior/Completed Antimicrobials:  cefepime   IVPB

## 2023-01-25 NOTE — PROGRESS NOTE ADULT - SUBJECTIVE AND OBJECTIVE BOX
Date of Service: 01-25-23 @ 18:48    Patient is a 82y old  Female who presents with a chief complaint of weakness (25 Jan 2023 15:12)      Any change in ROS: Seems OK:  no sob:  no cough:     MEDICATIONS  (STANDING):  budesonide 160 MICROgram(s)/formoterol 4.5 MICROgram(s) Inhaler 2 Puff(s) Inhalation two times a day  chlorhexidine 2% Cloths 1 Application(s) Topical daily  cholecalciferol 2000 Unit(s) Oral daily  citalopram 10 milliGRAM(s) Oral daily  fluticasone propionate 50 MICROgram(s)/spray Nasal Spray 1 Spray(s) Both Nostrils two times a day  folic acid 1 milliGRAM(s) Oral daily  guaiFENesin  milliGRAM(s) Oral every 12 hours  influenza  Vaccine (HIGH DOSE) 0.7 milliLiter(s) IntraMuscular once  ipratropium    for Nebulization 500 MICROGram(s) Nebulizer every 6 hours  metoprolol tartrate 25 milliGRAM(s) Oral two times a day  mirtazapine 7.5 milliGRAM(s) Oral daily  pantoprazole    Tablet 40 milliGRAM(s) Oral before breakfast  predniSONE   Tablet 20 milliGRAM(s) Oral daily  senna 1 Tablet(s) Oral daily  sodium bicarbonate 650 milliGRAM(s) Oral three times a day  trimethoprim  160 mG/sulfamethoxazole 800 mG 1 Tablet(s) Oral daily    MEDICATIONS  (PRN):  acetaminophen     Tablet .. 650 milliGRAM(s) Oral every 6 hours PRN Temp greater or equal to 38C (100.4F), Mild Pain (1 - 3)  aluminum hydroxide/magnesium hydroxide/simethicone Suspension 30 milliLiter(s) Oral every 4 hours PRN Dyspepsia  melatonin 3 milliGRAM(s) Oral at bedtime PRN Insomnia  ondansetron Injectable 4 milliGRAM(s) IV Push every 8 hours PRN Nausea and/or Vomiting  polyethylene glycol 3350 17 Gram(s) Oral daily PRN Constipation    Vital Signs Last 24 Hrs  T(C): 36.6 (25 Jan 2023 11:20), Max: 36.8 (24 Jan 2023 21:30)  T(F): 97.9 (25 Jan 2023 11:20), Max: 98.3 (24 Jan 2023 21:30)  HR: 83 (25 Jan 2023 11:20) (80 - 99)  BP: 106/62 (25 Jan 2023 11:20) (106/60 - 131/72)  BP(mean): --  RR: 18 (25 Jan 2023 11:20) (18 - 18)  SpO2: 92% (25 Jan 2023 11:20) (91% - 94%)    Parameters below as of 25 Jan 2023 11:20  Patient On (Oxygen Delivery Method): nasal cannula  O2 Flow (L/min): 3      I&O's Summary    24 Jan 2023 07:01  -  25 Jan 2023 07:00  --------------------------------------------------------  IN: 0 mL / OUT: 500 mL / NET: -500 mL    25 Jan 2023 07:01  -  25 Jan 2023 18:48  --------------------------------------------------------  IN: 480 mL / OUT: 400 mL / NET: 80 mL          Physical Exam:   GENERAL: thin ill appearing women  HEENT: RANJIT/   Atraumatic, Normocephalic  ENMT: No tonsillar erythema, exudates, or enlargement; Moist mucous membranes, Good dentition, No lesions  NECK: Supple, No JVD, Normal thyroid  CHEST/LUNG: Clear to auscultaion : some scattered cracekls  CVS: Regular rate and rhythm; No murmurs, rubs, or gallops  GI: : Soft, Nontender, Nondistended; Bowel sounds present  NERVOUS SYSTEM:  Alert & Oriented X3  EXTREMITIES: - edema  LYMPH: No lymphadenopathy noted  SKIN: No rashes or lesions  ENDOCRINOLOGY: No Thyromegaly  PSYCH: Appropriate    Labs:  20                            8.6    48.45 )-----------( 35       ( 25 Jan 2023 07:18 )             26.9                         8.6    49.81 )-----------( 37       ( 24 Jan 2023 11:29 )             26.3                         9.6    44.17 )-----------( 34       ( 22 Jan 2023 07:13 )             29.7     01-25    138  |  102  |  15  ----------------------------<  53<LL>  4.6   |  21<L>  |  0.69  01-22    137  |  102  |  12  ----------------------------<  64<L>  4.0   |  20<L>  |  0.55    Ca    9.0      25 Jan 2023 07:18    TPro  6.3  /  Alb  3.4  /  TBili  0.5  /  DBili  x   /  AST  20  /  ALT  6<L>  /  AlkPhos  51  01-25  TPro  6.5  /  Alb  3.3  /  TBili  0.6  /  DBili  x   /  AST  20  /  ALT  5<L>  /  AlkPhos  63  01-22    CAPILLARY BLOOD GLUCOSE      POCT Blood Glucose.: 127 mg/dL (25 Jan 2023 10:29)      LIVER FUNCTIONS - ( 25 Jan 2023 07:18 )  Alb: 3.4 g/dL / Pro: 6.3 g/dL / ALK PHOS: 51 U/L / ALT: 6 U/L / AST: 20 U/L / GGT: x                     RECENT CULTURES:    rad< from: CT Abdomen and Pelvis w/ IV Cont (01.18.23 @ 17:19) >  BOWEL: Moderate hiatal hernia. No bowel obstruction. Colonic   diverticulosis without acute diverticulitis. Stool is noted within the   large bowel.  PERITONEUM: No ascites.  RETROPERITONEUM/LYMPH NODES: No lymphadenopathy.  ABDOMINAL WALL: Within normal limits.  BONES: Multilevel degenerative changes of the spine and chronic appearing   compression fractures, most severe at the T12 vertebral body level.    IMPRESSION:  No pulmonary arterial embolism.    Status post wedge resections in both upper lobes. 2 cm nodular opacity   associated with the staple line in the right upper lobe is noted.   Etiology is unclear.    Multiple ill-defined opacities are noted within both lungs, more so in   the right lower lobe. Exact etiology is unclear.    Bilateral pleural effusions, right more than left.      --- End of Report ---    < end of copied text >      RESPIRATORY CULTURES:          Studies  Chest X-RAY  CT SCAN Chest   Venous Dopplers: LE:   CT Abdomen  Others

## 2023-01-25 NOTE — PROGRESS NOTE ADULT - SUBJECTIVE AND OBJECTIVE BOX
BETTIE WENDY  [id number]    INTERVAL HPI/OVERNIGHT EVENTS:    Patient remains stable on 3 L of ncO2. Bone marrow bx pending.    PMHx/PSHx/FamHx/SocHx reviewed and no significant changes    REVIEW OF SYSTEMS   - reviewed with patient and  negative other than as above or previously documented.     MEDICATIONS  (STANDING):  budesonide 160 MICROgram(s)/formoterol 4.5 MICROgram(s) Inhaler 2 Puff(s) Inhalation two times a day  chlorhexidine 2% Cloths 1 Application(s) Topical daily  cholecalciferol 2000 Unit(s) Oral daily  citalopram 10 milliGRAM(s) Oral daily  fluticasone propionate 50 MICROgram(s)/spray Nasal Spray 1 Spray(s) Both Nostrils two times a day  folic acid 1 milliGRAM(s) Oral daily  guaiFENesin  milliGRAM(s) Oral every 12 hours  influenza  Vaccine (HIGH DOSE) 0.7 milliLiter(s) IntraMuscular once  ipratropium    for Nebulization 500 MICROGram(s) Nebulizer every 6 hours  metoprolol tartrate 25 milliGRAM(s) Oral two times a day  mirtazapine 7.5 milliGRAM(s) Oral daily  pantoprazole    Tablet 40 milliGRAM(s) Oral before breakfast  predniSONE   Tablet 20 milliGRAM(s) Oral daily  senna 1 Tablet(s) Oral daily  sodium bicarbonate 650 milliGRAM(s) Oral three times a day  trimethoprim  160 mG/sulfamethoxazole 800 mG 1 Tablet(s) Oral daily    MEDICATIONS  (PRN):  acetaminophen     Tablet .. 650 milliGRAM(s) Oral every 6 hours PRN Temp greater or equal to 38C (100.4F), Mild Pain (1 - 3)  aluminum hydroxide/magnesium hydroxide/simethicone Suspension 30 milliLiter(s) Oral every 4 hours PRN Dyspepsia  melatonin 3 milliGRAM(s) Oral at bedtime PRN Insomnia  ondansetron Injectable 4 milliGRAM(s) IV Push every 8 hours PRN Nausea and/or Vomiting  polyethylene glycol 3350 17 Gram(s) Oral daily PRN Constipation      Allergies    codeine (Nausea)  doxycycline (Nausea)  penicillin (Hives)    Intolerances          Vital Signs Last 24 Hrs  T(C): 36.6 (2023 11:20), Max: 36.8 (2023 21:30)  T(F): 97.9 (2023 11:20), Max: 98.3 (2023 21:30)  HR: 83 (2023 11:20) (80 - 99)  BP: 106/62 (2023 11:20) (106/60 - 131/72)  BP(mean): --  RR: 18 (2023 11:20) (17 - 18)  SpO2: 92% (2023 11:20) (91% - 95%)    Parameters below as of 2023 11:20  Patient On (Oxygen Delivery Method): nasal cannula  O2 Flow (L/min): 3      Physical Exam:  General: No apparent distress  HEENT: EOMI, MMM  CVS: +S1/S2, RRR, no murmurs/rubs/gallops, Chemo-port  Resp: On 3L of ncO2. Hypoventilation in the L>R  GI: Soft, NT/ND +BS  MSK:   MSK: no synovitis, joints NTP    tongue: midline, no deviation   neck flexion/extension: 5/5   deltoid: 5/5 B/L  biceps/triceps: 5/5 B/L  Hand/: 5/5 B/L  Hip flexion/extension: 5/5 B/L  knee flexion/extension: 5/5 B/L    dorsiflexion: 3/5 L, 5/5 B/L  plantar flex: 5/5 B/L  Neuro: AAOx3  Skin: non blanching multiple purpuric lesions in the lower extremities, arms, and chest         LABS:                        8.6    48.45 )-----------( 35       ( 2023 07:18 )             26.9         138  |  102  |  15  ----------------------------<  53<LL>  4.6   |  21<L>  |  0.69    Ca    9.0      2023 07:18    TPro  6.3  /  Alb  3.4  /  TBili  0.5  /  DBili  x   /  AST  20  /  ALT  6<L>  /  AlkPhos  51          CD19 %: 9 % Immunoglobulins Panel (23 @ 07:18)   Quantitative Ig mg/dL   Quantitative IgA: 135 mg/dL   Quantitative IgM: 72 mg/dL   MAYA Kappa: 4.65 mg/dL   MAYA Lambda: 2.60 mg/dL   Kappa/Lambda Free Light Chain Ratio, Serum: 1.79 Ratio Protein/Creatinine Ratio Calculation: 1.5 Ratio (23 @ 07:17)     Quantitative Ig mg/dL   Quantitative IgA: 135 mg/dL   Quantitative IgM: 72 mg/dL         RADIOLOGY & ADDITIONAL TESTS:

## 2023-01-25 NOTE — PROGRESS NOTE ADULT - ASSESSMENT
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 She presented here from [facility] for elevated WBC and low hemoglobin, severe weakness. Pt states for the past 2-3 days has been having increased weakness and lethargy, decreased appetite. +sputum productive cough. + cui, no sob, no difficulty breathing. No abdominal pain, nausea, vomiting, no bloody stools. Has endorsed multiple episodes of loose stools x weeks, currently being treated for c.diff with oral vancomycin.       Fatigue/dyspnea: likely due to severe anemia  - pt s/p total 2 units PRBC   - no melena, no hematochezia. no BRBPR. occult stool neg.   - repeat post transfusion hgb stable.  - likely poor appetite with iron deficiency vs. r/o other hematological issues  - no clear signs of infection, monitor off abx.   - monitor for fluid overload, she tends to require IV Lasix intermittently post transfusion.  - IV Lasix 20 mg x 1 for basilar crackles post transfusion. stable.   - iron studies (though recent transfusion likely affect results), vit b12, D, folic, TSH.   - resume diet. doubt GI bleed   - Physical therapy. Out of bed to chair with assistance.   - House heme/onc consulted for pancytopenia with elevated WBC. s/p bone marrow bx 1/23/23. path pending  - reconsulted Rheum for follow up treatment of vasculitis  (she is chronically on steroids).   - ID consulted to see if she may need PCP ppx regimen on long term steroids. Started Bactrim pcp ppx.    Diarrhea  - elevated WBC  - no documented c.diff PCR, re-ordered.  - s/p Vanco PO a few days (started in rehab)  - diarrhea completely resolved.   - monitor off PO vanco per ID  - now constipated. PRN bowel regimen started. +BM    Pulmonary vasculitis   - Recent TTE on 11/3/22 reviewed: normal LV. outpt card Dr. Ady Cooper  - outpt pulm Mack Tucker   - s/p thoracoscopic biopsy of mediastinal lymph node and Lung wedge resection 11/10/22  - recent pleural effusion s/p 650 cc U/S-guided rt thoracentesis 11/17/22  - exudative but no neutrophilic predominance and initial Gram stain w/o organisms  - cultures neg.   - path results favoring vasculitis: no evidence for lymphoma. Cytology also came back negative.   - comfortable on nasal canula, better post diuretic last admission.   - rheum and heme-onc following previously, will reconsult.   - last admission, she was not started on immunosuppressants such as cellcept given recent treatment for COVID19 at that time  - c/w prednisone 20 mg qdaily.   - RTX under consideration pending acute hepatitis panel and quantiferon  - ID started PCP ppx with Bactrim.     hx of Tachycardia    - cont low-dose metoprolol, 50 mg to 25 mg dose reduced in rehab.   - card consult (Dr. Hooker) in the past, if needed    Anxiety and depression  - stable, continue citalopram and Remeron.  - Pt denied SI/HI ideations, denied visual and auditory hallucinations.     GERD (gastroesophageal reflux disease)  - continue PPI    Hyperlipidemia.   - continue home ezetimibe. okay to hold if nonformulary   - Lipid panel    DVT ppx   - holding AC in the setting of anemia, pancytopenia.   - will start Lovenox SQ if pancytopenia/ plts recovers.

## 2023-01-25 NOTE — PROGRESS NOTE ADULT - SUBJECTIVE AND OBJECTIVE BOX
Lying in bed  No acute SOB  No acute pain    Vital Signs Last 24 Hrs  T(C): 36.7 (25 Jan 2023 05:24), Max: 36.8 (24 Jan 2023 12:15)  T(F): 98 (25 Jan 2023 05:24), Max: 98.3 (24 Jan 2023 21:30)  HR: 99 (25 Jan 2023 05:24) (80 - 99)  BP: 131/72 (25 Jan 2023 05:24) (106/60 - 131/72)  BP(mean): --  RR: 18 (25 Jan 2023 05:24) (17 - 18)  SpO2: 91% (25 Jan 2023 05:24) (91% - 95%)    I&O's Summary    01-24-23 @ 07:01  -  01-25-23 @ 07:00  --------------------------------------------------------  IN: 0 mL / OUT: 500 mL / NET: -500 mL          PHYSICAL EXAM:  GENERAL: NAD, well-developed, comfortable on nasal canula  HEAD:  Atraumatic, Normocephalic  EYES: EOMI, PERRLA, conjunctiva and sclera clear  NECK: Supple, No JVD  CHEST/LUNG: mild decrease breath sounds bilaterally; No wheeze   HEART: Regular rate and rhythm; No murmurs, rubs, or gallops  ABDOMEN: Soft, Nontender, Nondistended; Bowel sounds present  Neuro: AAOx3, no focal weakness, 5/5 b/l extremity strength  EXTREMITIES:  2+ Peripheral Pulses, No clubbing, cyanosis, trace b/l edema      LABS:                        8.6    48.45 )-----------( 35       ( 25 Jan 2023 07:18 )             26.9     01-25    138  |  102  |  15  ----------------------------<  53<LL>  4.6   |  21<L>  |  0.69    Ca    9.0      25 Jan 2023 07:18    TPro  6.3  /  Alb  3.4  /  TBili  0.5  /  DBili  x   /  AST  20  /  ALT  6<L>  /  AlkPhos  51  01-25      CAPILLARY BLOOD GLUCOSE                RADIOLOGY & ADDITIONAL TESTS:    Imaging Personally Reviewed:  [x] YES  [ ] NO    Will obtain old records:  [ ] YES  [x] NO

## 2023-01-25 NOTE — PROGRESS NOTE ADULT - ASSESSMENT
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 Patient was sent to Rehab. Subsequently on 1/18 patient is readmitted for productive cough and general weakness w/hb 5. Rheumatology was consulted for further help in management.    #AAV-MPO  -Patient w/hx of mononeuritis multiplex, kidney  and lung involvement,  -Previous treatment with RTX, transitioned to AZA and off any therapy while receiving R-CHOP   -CT chest IN 11/2022 Increased mediastinal lymphadenopathy. A reference low right paratracheal node measures 2.4 x 1.8 cm (2, 39), interval increase in size from prior when it measured 2.0 x 1.2 cm. -11/10/22 R wedge resection showed capillaritis with fibrin, giant cells and inflammation around small and intermediate sized vessels. Elastic stain showed vasculocentricity of the inflammation. Lymph node showed:  Focal giant cells and focal necrosis are seen in the lymph node as well   -CT chest on this admission showed multiple ill-defined opacities are noted within both lungs, more so in the right lower lobe. Exact etiology is unclear. Bilateral pleural effusions, right more than left.  -On exam: weakness of dorsiflexion of left foot, petechial lesions of UE and LE (?secondary to thrombocytopenia)    -On prednisone 20 mg daily   -Concerning for reactivation of AAV-MPO  -Immunoglobulin normal  -Plan for RTX, waiting for Bone marrow bx result (consent was obtained and it is in the chart)    #Bicytopenia   -Admitted w/H/H 5.9/18.2, platelet 68 requiring 1 U PRBC  -Denied hemoptysis, melena, hematochezia, and hematuria  -Etiology is unclear and plan for bone marrow bx per Hem/Onc w/underlying hx of Large B cell lymphoma     -Pending bone marrow bx result  -Kappa/Lambda 1.5        #C.diff- resolved     #Leukocytosis  WBC 49 on admission     Plan  -Patient  on 2-3L of ncO2 w/o sign of hemoptysis. W/high risk of infection and plan for bone marrow biopsy, we will hold on pulse of steroid.   -CW 20 mg prednisone  -D/w outpatient Hem/Onc Dr Madrigal and agreed with RTX   -Bone marrow bx result pending  -Repeat QuantiFeron, and hepatitis in anticipation of immunosuppressive therapy   -Recommend PCP prophylaxis and PPI   -ID is not recommending abx       DW Dr. 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 Patient was sent to Rehab. Subsequently on 1/18 patient is readmitted for productive cough and general weakness w/hb 5. Rheumatology was consulted for further help in management.    #AAV-MPO  -Patient w/hx of mononeuritis multiplex, kidney  and lung involvement,  -Previous treatment with RTX, transitioned to AZA and off any therapy while receiving R-CHOP   -CT chest IN 11/2022 Increased mediastinal lymphadenopathy. A reference low right paratracheal node measures 2.4 x 1.8 cm (2, 39), interval increase in size from prior when it measured 2.0 x 1.2 cm. -11/10/22 R wedge resection showed capillaritis with fibrin, giant cells and inflammation around small and intermediate sized vessels. Elastic stain showed vasculocentricity of the inflammation. Lymph node showed:  Focal giant cells and focal necrosis are seen in the lymph node as well   -CT chest on this admission showed multiple ill-defined opacities are noted within both lungs, more so in the right lower lobe. Exact etiology is unclear. Bilateral pleural effusions, right more than left.  -On exam: weakness of dorsiflexion of left foot, petechial lesions of UE and LE (?secondary to thrombocytopenia)    -On prednisone 20 mg daily   -Concerning for reactivation of AAV-MPO  -Immunoglobulin normal  -Plan for RTX, waiting for Bone marrow bx result (consent was obtained and it is in the chart)    #Bicytopenia   -Admitted w/H/H 5.9/18.2, platelet 68 requiring 1 U PRBC  -Denied hemoptysis, melena, hematochezia, and hematuria  -Etiology is unclear and plan for bone marrow bx per Hem/Onc w/underlying hx of Large B cell lymphoma     -Pending bone marrow bx result  -Kappa/Lambda 1.5        #C.diff- resolved     #Leukocytosis  WBC 49 on admission     Plan  -Patient  on 2-3L of ncO2 w/o sign of hemoptysis  -CW 20 mg prednisone  -D/w outpatient Hem/Onc Dr Madrigal and agreed with RTX   -Bone marrow bx result pending  -Repeat QuantiFeron, and hepatitis in anticipation of immunosuppressive therapy   -Recommend PCP prophylaxis and PPI   -ID is not recommending abx       DW Dr. Aguila Devine MD  PGY-4 Rheumatology Fellow  Pager: [phone]   Available in teams

## 2023-01-26 NOTE — DIETITIAN INITIAL EVALUATION ADULT - REASON INDICATOR FOR ASSESSMENT
Length of stay. 82 yr old female with a PMHx of diffuse large B cell lymphoma in remission, non-hodgkin's lymphoma (chemoport), depression, HLD, GERD, PE/DVT , ANCA-vasculitis, s/p LVATS, LUIS wedge resection (2021), recent direct admit for RVATS, RUL Nodule Biopsy w/ IR marking. Presented here from [facility] for elevated WBC and low hemoglobin, severe weakness.

## 2023-01-26 NOTE — DIETITIAN INITIAL EVALUATION ADULT - PERTINENT LABORATORY DATA
01-25    138  |  102  |  15  ----------------------------<  53<LL>  4.6   |  21<L>  |  0.69    Ca    9.0      25 Jan 2023 07:18    TPro  6.3  /  Alb  3.4  /  TBili  0.5  /  DBili  x   /  AST  20  /  ALT  6<L>  /  AlkPhos  51  01-25

## 2023-01-26 NOTE — PROGRESS NOTE ADULT - SUBJECTIVE AND OBJECTIVE BOX
[facility] DIVISION OF INFECTIOUS DISEASES  ANDREW Rodríguez Y. Patel, S. Shah, G. Casimir  [phone]  ([phone] - weekdays after 5pm and weekends)    Name: BETTIE NGUYEN  Age/Gender: 82y Female  MRN: [id number]    Interval History:  Patient seen and examined this morning.   No new complaints  Notes reviewed.   No concerning overnight events.  Afebrile.   Allergies: codeine (Nausea)  doxycycline (Nausea)  penicillin (Hives)      Objective:  Vitals:   T(F): 98 (01-26-23 @ 04:45), Max: 98 (01-26-23 @ 04:45)  HR: 84 (01-26-23 @ 04:45) (78 - 84)  BP: 130/76 (01-26-23 @ 04:45) (106/62 - 130/76)  RR: 18 (01-26-23 @ 04:45) (18 - 18)  SpO2: 92% (01-26-23 @ 04:45) (92% - 95%)  Physical Examination:  General: no acute distress  HEENT: NC/AT, EOMI, anicteric, neck supple  Cardio: S1, S2 present, normal rate, no murmur  Resp: clear to auscultation bilaterally  Abd: soft, NT, ND, + BS  Neuro: AAOx3, no obvious focal deficits  Ext: no edema, no cyanosis, moving extremities  Skin: warm, dry, no visible rash  Psych: appropriate affect and mood for situation  Lines: PIV    Laboratory Studies:  CBC:                       8.6    48.45 )-----------( 35       ( 25 Jan 2023 07:18 )             26.9     WBC Trend:  48.45 01-25-23 @ 07:18  49.81 01-24-23 @ 11:29  44.17 01-22-23 @ 07:13  40.74 01-21-23 @ 07:25  46.62 01-20-23 @ 07:13  42.59 01-19-23 @ 11:20    CMP: 01-25    138  |  102  |  15  ----------------------------<  53<LL>  4.6   |  21<L>  |  0.69    Ca    9.0      25 Jan 2023 07:18    TPro  6.3  /  Alb  3.4  /  TBili  0.5  /  DBili  x   /  AST  20  /  ALT  6<L>  /  AlkPhos  51  01-25    Creatinine, Serum: 0.69 mg/dL (01-25-23 @ 07:18)  Creatinine, Serum: 0.55 mg/dL (01-22-23 @ 07:11)  Creatinine, Serum: 0.55 mg/dL (01-21-23 @ 07:22)  Creatinine, Serum: 0.67 mg/dL (01-20-23 @ 07:14)  Creatinine, Serum: 0.57 mg/dL (01-19-23 @ 11:20)      LIVER FUNCTIONS - ( 25 Jan 2023 07:18 )  Alb: 3.4 g/dL / Pro: 6.3 g/dL / ALK PHOS: 51 U/L / ALT: 6 U/L / AST: 20 U/L / GGT: x               Microbiology: reviewed     Culture - Blood (collected 01-18-23 @ 15:50)  Source: .Blood Blood-Peripheral  Final Report (01-23-23 @ 23:01):    No Growth Final    Culture - Blood (collected 01-18-23 @ 15:30)  Source: .Blood Blood-Peripheral  Final Report (01-23-23 @ 23:01):    No Growth Final    Radiology: reviewed     Medications:  acetaminophen     Tablet .. 650 milliGRAM(s) Oral every 6 hours PRN  aluminum hydroxide/magnesium hydroxide/simethicone Suspension 30 milliLiter(s) Oral every 4 hours PRN  budesonide 160 MICROgram(s)/formoterol 4.5 MICROgram(s) Inhaler 2 Puff(s) Inhalation two times a day  chlorhexidine 2% Cloths 1 Application(s) Topical daily  cholecalciferol 2000 Unit(s) Oral daily  citalopram 10 milliGRAM(s) Oral daily  fluticasone propionate 50 MICROgram(s)/spray Nasal Spray 1 Spray(s) Both Nostrils two times a day  folic acid 1 milliGRAM(s) Oral daily  guaiFENesin  milliGRAM(s) Oral every 12 hours  influenza  Vaccine (HIGH DOSE) 0.7 milliLiter(s) IntraMuscular once  ipratropium    for Nebulization 500 MICROGram(s) Nebulizer every 6 hours  melatonin 3 milliGRAM(s) Oral at bedtime PRN  metoprolol tartrate 25 milliGRAM(s) Oral two times a day  mirtazapine 7.5 milliGRAM(s) Oral daily  ondansetron Injectable 4 milliGRAM(s) IV Push every 8 hours PRN  pantoprazole    Tablet 40 milliGRAM(s) Oral before breakfast  polyethylene glycol 3350 17 Gram(s) Oral daily PRN  predniSONE   Tablet 20 milliGRAM(s) Oral daily  senna 1 Tablet(s) Oral daily  sodium bicarbonate 650 milliGRAM(s) Oral three times a day  trimethoprim  160 mG/sulfamethoxazole 800 mG 1 Tablet(s) Oral daily    Current Antimicrobials:  trimethoprim  160 mG/sulfamethoxazole 800 mG 1 Tablet(s) Oral daily    Prior/Completed Antimicrobials:  cefepime   IVPB

## 2023-01-26 NOTE — DIETITIAN INITIAL EVALUATION ADULT - ADD RECOMMEND
Monitor/encourage PO intake; continue ensure enlive x3 daily; assist with menus/food preferences;  tray prep; monitor weight, skin, labs.  Continue folic acid, bowel regimen; monitor/encourage PO intake; continue ensure enlive x3 daily; assist with menus/food preferences;  tray prep; monitor weight, skin, labs.

## 2023-01-26 NOTE — DIETITIAN INITIAL EVALUATION ADULT - ETIOLOGY
inadequate protein energy intake in setting of increased nutrient needs inadequate protein energy intake in setting of increased nutrient needs

## 2023-01-26 NOTE — PROGRESS NOTE ADULT - ASSESSMENT
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 She presented here from [facility] for elevated WBC and low hemoglobin, severe weakness. Pt states for the past 2-3 days has been having increased weakness and lethargy, decreased appetite. Reports cough, currently nonproductive, no sob, no difficulty breathing. No abdominal pain, nausea, vomiting, no bloody stools. Has endorsed multiple episodes of loose stools x weeks, currently being treated for c.diff with oral vancomycin.     Fatigue/dyspnea likely due to severe anemia s/p transfusions  Diarrhea - recently tried marijuana to help with appetite and noted worsening    - off po vancomycin given no diarrhea, unable to send sample   - diarrhea resolved and became constipated, now having soft-formed BMs   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  Leukocytosis -- unclear etiology - no clear infectious etiology found to date  ?reactive in setting of above and also on steroids   H/o DLBCL, EBV+   - Pulmonary, Rheumatology and Heme/Onc following   - noted patient had similar presentation with bicytopenia and leukocytosis in the past, may need tx with rituximab   - s/p BMBx 1/23-results pending   - RVP/COVID negative, has chronic cough-unchanged    - L chest port without sign of infection, no s/o SSTI, no focal findings on exam   - CT - s/p wedge resections in b/l upper lobes, 2 cm nodular opacity a/w staple line in RUL-unclear etiology; multiple ill-defined opacities in both lungs more in RLL -unclear etiology, b/l effusions R>L   - clinically no sign of pneumonia noted   - Prior hx/chart reviewed; cultures were negative at that time    - Bcx here negative x2    Recommendations:  Continue on Bactrim DS 1 tablet daily for PCP ppx while on prednisone  Continue to monitor off abx other than above given pt afebrile, cx negative and no infectious source found  Monitor temps/CBC   - if spikes fever, repeat CXR, blood cultures x2 and can start empiric cefepime       Yevgeniy Malave M.D.  [facility], Division of Infectious Diseases  [phone]  After 5pm on weekdays and all day on weekends - please call [phone]

## 2023-01-26 NOTE — DIETITIAN INITIAL EVALUATION ADULT - NSFNSADHERENCEPTAFT_GEN_A_CORE
patient follows a regular diet, currently on a fluid restriction  1200ml daily; patient awareness rinforced

## 2023-01-26 NOTE — PROGRESS NOTE ADULT - SUBJECTIVE AND OBJECTIVE BOX
Date of Service: 01-26-23 @ 12:37    Patient is a 82y old  Female who presents with a chief complaint of weakness (26 Jan 2023 09:40)      Any change in ROS: sHe is sitting in chair:  no sob:  on 2 L of oxygen  : feels pretty weak      MEDICATIONS  (STANDING):  budesonide 160 MICROgram(s)/formoterol 4.5 MICROgram(s) Inhaler 2 Puff(s) Inhalation two times a day  chlorhexidine 2% Cloths 1 Application(s) Topical daily  cholecalciferol 2000 Unit(s) Oral daily  citalopram 10 milliGRAM(s) Oral daily  fluticasone propionate 50 MICROgram(s)/spray Nasal Spray 1 Spray(s) Both Nostrils two times a day  folic acid 1 milliGRAM(s) Oral daily  guaiFENesin  milliGRAM(s) Oral every 12 hours  influenza  Vaccine (HIGH DOSE) 0.7 milliLiter(s) IntraMuscular once  ipratropium    for Nebulization 500 MICROGram(s) Nebulizer every 6 hours  metoprolol tartrate 25 milliGRAM(s) Oral two times a day  mirtazapine 7.5 milliGRAM(s) Oral daily  pantoprazole    Tablet 40 milliGRAM(s) Oral before breakfast  predniSONE   Tablet 20 milliGRAM(s) Oral daily  senna 1 Tablet(s) Oral daily  sodium bicarbonate 650 milliGRAM(s) Oral three times a day  trimethoprim  160 mG/sulfamethoxazole 800 mG 1 Tablet(s) Oral daily    MEDICATIONS  (PRN):  acetaminophen     Tablet .. 650 milliGRAM(s) Oral every 6 hours PRN Temp greater or equal to 38C (100.4F), Mild Pain (1 - 3)  aluminum hydroxide/magnesium hydroxide/simethicone Suspension 30 milliLiter(s) Oral every 4 hours PRN Dyspepsia  melatonin 3 milliGRAM(s) Oral at bedtime PRN Insomnia  ondansetron Injectable 4 milliGRAM(s) IV Push every 8 hours PRN Nausea and/or Vomiting  polyethylene glycol 3350 17 Gram(s) Oral daily PRN Constipation    Vital Signs Last 24 Hrs  T(C): 36.7 (26 Jan 2023 04:45), Max: 36.7 (26 Jan 2023 04:45)  T(F): 98 (26 Jan 2023 04:45), Max: 98 (26 Jan 2023 04:45)  HR: 84 (26 Jan 2023 04:45) (78 - 84)  BP: 130/76 (26 Jan 2023 04:45) (111/65 - 130/76)  BP(mean): --  RR: 18 (26 Jan 2023 04:45) (18 - 18)  SpO2: 92% (26 Jan 2023 04:45) (92% - 95%)    Parameters below as of 25 Jan 2023 20:46  Patient On (Oxygen Delivery Method): nasal cannula  O2 Flow (L/min): 3      I&O's Summary    25 Jan 2023 07:01  -  26 Jan 2023 07:00  --------------------------------------------------------  IN: 730 mL / OUT: 400 mL / NET: 330 mL          Physical Exam:   GENERAL: thin cachectic wpomen  HEENT: RANJIT/   Atraumatic, Normocephalic  ENMT: No tonsillar erythema, exudates, or enlargement; Moist mucous membranes, Good dentition, No lesions  NECK: Supple, No JVD, Normal thyroid  CHEST/LUNG: scattered cracekls+  CVS: Regular rate and rhythm; No murmurs, rubs, or gallops  GI: : Soft, Nontender, Nondistended; Bowel sounds present  NERVOUS SYSTEM:  Alert & Oriented X3  EXTREMITIES: -edema  LYMPH: No lymphadenopathy noted  SKIN: No rashes or lesions  ENDOCRINOLOGY: No Thyromegaly  PSYCH: Appropriate    Labs:                              8.6    48.45 )-----------( 35       ( 25 Jan 2023 07:18 )             26.9                         8.6    49.81 )-----------( 37       ( 24 Jan 2023 11:29 )             26.3     01-25    138  |  102  |  15  ----------------------------<  53<LL>  4.6   |  21<L>  |  0.69    Ca    9.0      25 Jan 2023 07:18    TPro  6.3  /  Alb  3.4  /  TBili  0.5  /  DBili  x   /  AST  20  /  ALT  6<L>  /  AlkPhos  51  01-25    CAPILLARY BLOOD GLUCOSE          LIVER FUNCTIONS - ( 25 Jan 2023 07:18 )  Alb: 3.4 g/dL / Pro: 6.3 g/dL / ALK PHOS: 51 U/L / ALT: 6 U/L / AST: 20 U/L / GGT: x               rd< from: CT Angio Chest PE Protocol w/ IV Cont (01.18.23 @ 17:19) >  BOWEL: Moderate hiatal hernia. No bowel obstruction. Colonic   diverticulosis without acute diverticulitis. Stool is noted within the   large bowel.  PERITONEUM: No ascites.  RETROPERITONEUM/LYMPH NODES: No lymphadenopathy.  ABDOMINAL WALL: Within normal limits.  BONES: Multilevel degenerative changes of the spine and chronic appearing   compression fractures, most severe at the T12 vertebral body level.    IMPRESSION:  No pulmonary arterial embolism.    Status post wedge resections in both upper lobes. 2 cm nodular opacity   associated with the staple line in the right upper lobe is noted.   Etiology is unclear.    Multiple ill-defined opacities are noted within both lungs, more so in   the right lower lobe. Exact etiology is unclear.    Bilateral pleural effusions, right more than left.    < end of copied text >        RECENT CULTURES:        RESPIRATORY CULTURES:          Studies  Chest X-RAY  CT SCAN Chest   Venous Dopplers: LE:   CT Abdomen  Others

## 2023-01-26 NOTE — DIETITIAN INITIAL EVALUATION ADULT - COLLABORATION WITH OTHER PROVIDERS
discussed renuka assistance with RN NEEL Tejeda) discussed reorder of Ensure clears x3 daily with NP Red grayson assistance and weight obtained from  RN

## 2023-01-26 NOTE — DIETITIAN INITIAL EVALUATION ADULT - OTHER INFO
patient insisted she was not 115 lbs as indicated in chart header,. Pt reweighed by RN, noted current 96 lbs placed in flowsheet

## 2023-01-26 NOTE — DIETITIAN INITIAL EVALUATION ADULT - PERTINENT MEDS FT
MEDICATIONS  (STANDING):  budesonide 160 MICROgram(s)/formoterol 4.5 MICROgram(s) Inhaler 2 Puff(s) Inhalation two times a day  chlorhexidine 2% Cloths 1 Application(s) Topical daily  cholecalciferol 2000 Unit(s) Oral daily  citalopram 10 milliGRAM(s) Oral daily  fluticasone propionate 50 MICROgram(s)/spray Nasal Spray 1 Spray(s) Both Nostrils two times a day  folic acid 1 milliGRAM(s) Oral daily  guaiFENesin  milliGRAM(s) Oral every 12 hours  influenza  Vaccine (HIGH DOSE) 0.7 milliLiter(s) IntraMuscular once  ipratropium    for Nebulization 500 MICROGram(s) Nebulizer every 6 hours  metoprolol tartrate 25 milliGRAM(s) Oral two times a day  mirtazapine 7.5 milliGRAM(s) Oral daily  pantoprazole    Tablet 40 milliGRAM(s) Oral before breakfast  predniSONE   Tablet 20 milliGRAM(s) Oral daily  senna 1 Tablet(s) Oral daily  sodium bicarbonate 650 milliGRAM(s) Oral three times a day  trimethoprim  160 mG/sulfamethoxazole 800 mG 1 Tablet(s) Oral daily    MEDICATIONS  (PRN):  acetaminophen     Tablet .. 650 milliGRAM(s) Oral every 6 hours PRN Temp greater or equal to 38C (100.4F), Mild Pain (1 - 3)  aluminum hydroxide/magnesium hydroxide/simethicone Suspension 30 milliLiter(s) Oral every 4 hours PRN Dyspepsia  melatonin 3 milliGRAM(s) Oral at bedtime PRN Insomnia  ondansetron Injectable 4 milliGRAM(s) IV Push every 8 hours PRN Nausea and/or Vomiting  polyethylene glycol 3350 17 Gram(s) Oral daily PRN Constipation

## 2023-01-26 NOTE — PROGRESS NOTE ADULT - ASSESSMENT
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 She presented here from [facility] for elevated WBC and low hemoglobin, severe weakness. Pt states for the past 2-3 days has been having increased weakness and lethargy, decreased appetite. +sputum productive cough. + cui, no sob, no difficulty breathing. No abdominal pain, nausea, vomiting, no bloody stools. Has endorsed multiple episodes of loose stools x weeks, currently being treated for c.diff with oral vancomycin.       Fatigue/dyspnea: likely due to severe anemia  - pt s/p total 2 units PRBC   - no melena, no hematochezia. no BRBPR. occult stool neg.   - repeat post transfusion hgb stable.  - likely poor appetite with iron deficiency vs. r/o other hematological issues  - no clear signs of infection, monitor off abx.   - monitor for fluid overload, she tends to require IV Lasix intermittently post transfusion.  - IV Lasix 20 mg x 1 for basilar crackles post transfusion. stable.   - iron studies (though recent transfusion likely affect results), vit b12, D, folic, TSH.   - resume diet. doubt GI bleed   - Physical therapy. Out of bed to chair with assistance.   - House heme/onc consulted for pancytopenia with elevated WBC. s/p bone marrow bx 1/23/23. path pending  - reconsulted Rheum for follow up treatment of vasculitis  (she is chronically on steroids).   - ID consulted to see if she may need PCP ppx regimen on long term steroids. Started Bactrim pcp ppx.    Diarrhea  - elevated WBC  - no documented c.diff PCR, re-ordered.  - s/p Vanco PO a few days (started in rehab)  - diarrhea completely resolved.   - monitor off PO vanco per ID  - now constipated. PRN bowel regimen started. +BM    Pulmonary vasculitis   - Recent TTE on 11/3/22 reviewed: normal LV. outpt card Dr. Ady Cooper  - outpt pulm Mack Tucker   - s/p thoracoscopic biopsy of mediastinal lymph node and Lung wedge resection 11/10/22  - recent pleural effusion s/p 650 cc U/S-guided rt thoracentesis 11/17/22  - exudative but no neutrophilic predominance and initial Gram stain w/o organisms  - cultures neg.   - path results favoring vasculitis: no evidence for lymphoma. Cytology also came back negative.   - comfortable on nasal canula, better post diuretic last admission.   - rheum and heme-onc following previously, will reconsult.   - last admission, she was not started on immunosuppressants such as cellcept given recent treatment for COVID19 at that time  - c/w prednisone 20 mg qdaily.   - RTX under consideration pending acute hepatitis panel and quantiferon  - ID started PCP ppx with Bactrim.     hx of Tachycardia    - cont low-dose metoprolol, 50 mg to 25 mg dose reduced in rehab.   - card consult (Dr. Hooker) in the past, if needed    Anxiety and depression  - stable, continue citalopram and Remeron.  - Pt denied SI/HI ideations, denied visual and auditory hallucinations.     GERD (gastroesophageal reflux disease)  - continue PPI    Hyperlipidemia.   - continue home ezetimibe. okay to hold if nonformulary     DVT ppx   - holding AC in the setting of anemia, pancytopenia.   - will start Lovenox SQ if pancytopenia/ plts recovers.

## 2023-01-26 NOTE — DIETITIAN INITIAL EVALUATION ADULT - SIGNS/SYMPTOMS
weight loss, severe muscle depletion and fat loss weight loss >30% in 3 months, BMI=18.7, severe muscle depletion and fat loss , h/o catabolic illness

## 2023-01-26 NOTE — PROGRESS NOTE ADULT - ASSESSMENT
82 yr old female with a PMHx of diffuse large B cell lymphoma in remission, non-hodgkin's lymphoma (chemoport), depression, HLD, GERD, PE/DVT (4/2021 no longer on Eliquis), ANCA-vasculitis (20 y/a, no meds), s/p LVATS, LUIS wedge resection (2021), recent direct admit for RVATS, RUL Nodule Biopsy w/ IR marking in [facility], path favoring vasculitis, treated with Decadron, then switched to PO prednisone, went to [facility]. She presented here from [facility] for elevated WBC and low hemoglobin, severe weakness. Pt states for the past 2-3 days has been having increased weakness and lethargy, decreased appetite. +sputum productive cough. + cui, no sob, no difficulty breathing. No abdominal pain, nausea, vomiting, no bloody stools. Has endorsed multiple episodes of loose stools x weeks, currently being treated for c.diff with oral vancomycin.       Fatigue/dyspnea: likely due to severe anemia :  pt s/p 1 unit PRB  Diarrhea  Pulmonary vasculitis  :  hx of Tachycardia :  Recent TTE on 11/3/22 reviewed: normal LV. outpt card Dr. Ady Cooper  Anxiety and depression  GERD (gastroesophageal reflux disease)  Hyperlipidemia.   DVT ppx     1/20:  Fatigue/dyspnea: likely due to severe anemia :  pt s/p 1 unit PRBC: hb now  > 10  : she is on 2 L of oxygen : no wheezing: no overt signs of bleeding : ct chest is abnormal report noted:  has jean-claude ill defined opacities of unclear etiology  : venous ph is mild acidotic:  no need for Bipap at this time : monitor and trend hb  Diarrhea : symptomatic rx:  workup per primary team  Pulmonary vasculitis  : per rheumatology  : had recent vats surgery : LN biopsy noted:   hx of Tachycardia :  Recent TTE on 11/3/22 reviewed: normal LV. outpt card Dr. Ady Cooper  Anxiety and depression  GERD (gastroesophageal reflux disease) : ppi   Hyperlipidemia.   recent covid  : o n last admission she was treated for covid infection:   DVT ppx   d wteam    1/22:    Fatigue/dyspnea: likely due to severe anemia :  pt s/p 1 unit PRBC: hb now  > 10  : she is on 2 L of oxygen : no wheezing: no overt signs of bleeding : ct chest is abnormal report noted:  has jean-claude ill defined opacities of unclear etiology  : venous ph is mild acidotic:  no need for Bipap at this time : monitor and trend hb: she remained stable o gracie last 1 days:  she is not on any antibtiocs at this time: RVP  and blood cultures are negative: she had ebus biopsy also before: reviewed: ID following  Diarrhea : symptomatic rx:  workup per primary team : seems to have resolved  Pulmonary vasculitis  : per rheumatology  : had recent vats surgery : LN biopsy noted:   Bicytopneia and leucocytosis: ? etiology  : for possible bone marrow biopsy on Monday    hx of Tachycardia :  Recent TTE on 11/3/22 reviewed: normal LV. outpt card Dr. Ady Cooper  Anxiety and depression  GERD (gastroesophageal reflux disease) : ppi   Hyperlipidemia.   recent covid  : on last admission she was treated for covid infection:   DVT ppx   dw team    1/23:    Fatigue/dyspnea: likely due to severe anemia : feels weak  but no bleeding noted:  on 2 L of oxygen : she needs PT OT   Diarrhea :resolved:   Pulmonary vasculitis  : per rheumatology  : had recent vats surgery : LN biopsy noted: : she never received teat ment for vasculitis except low dose steroids:  she is awaiting bone marrow biopsy prior to induction therapy with rituximab: The ct chest done on this admission does not show pneumothorax and has jean-claude effusions:  There are some new opacites in rigth lower lobe which were not therre:  clinically she does not seem to have pneumonia: ID following: could it be a prt of vasculitis  Bicytopneia and leucocytosis: ? etiology  : for possible bone marrow biopsy today  hx of Tachycardia :  Recent TTE on 11/3/22 reviewed: normal LV. outpt card Dr. Ady Cooper  Anxiety and depression  GERD (gastroesophageal reflux disease) : ppi   Hyperlipidemia.   recent covid  : on last admission she was treated for covid infection:   DVT ppx   dw team    1/24:    Fatigue/dyspnea: likely due to severe anemia : feels weak  but no bleeding noted:  on 2 L of oxygen : she needs PT OT   Diarrhea :resolved:   Pulmonary vasculitis  : per rheumatology  : had recent vats surgery : LN biopsy noted: : she never received teat ment for vasculitis except low dose steroids:  she is awaiting bone marrow biopsy prior to induction therapy with rituximab: The ct chest done on this admission does not show pneumothorax and has jean-claude effusions:  There are some new opacites in rigth lower lobe which were not therre:  clinically she does not seem to have pneumonia: ID following: could it be a prt of vasculitis  Bicytopneia and leucocytosis: BM biopsy done: await results for eventual immunosuppressives therapy:   hx of Tachycardia :  Recent TTE on 11/3/22 reviewed: normal LV. outpt card Dr. Ady Cooper  Anxiety and depression  GERD (gastroesophageal reflux disease) : ppi   Hyperlipidemia.   recent covid  : on last admission she was treated for covid infection:   DVT ppx   dw team    1/25:    Fatigue/dyspnea: likely due to severe anemia : feels weak  but no bleeding noted:  on 2 L of oxygen : she needs PT OT   Diarrhea :resolved:   Pulmonary vasculitis  : per rheumatology  : had recent vats surgery : LN biopsy noted: : she never received teat ment for vasculitis except low dose steroids:  she is awaiting bone marrow biopsy prior to induction therapy with rituximab: The ct chest done on this admission does not show pneumothorax and has jean-claude effusions:  There are some new opacites in rigth lower lobe which were not there:  clinically she does not seem to have pneumonia: ID following: could it be a part of vasculitis : her resp status has not changed   Bicytopneia and leucocytosis: BM biopsy done: await results for still pending   hx of Tachycardia :  Recent TTE on 11/3/22 reviewed: normal LV. outpt card Dr. Ady Cooper  Anxiety and depression  GERD (gastroesophageal reflux disease) : ppi   Hyperlipidemia.   recent covid  : on last admission she was treated for covid infection:   DVT ppx   dw team: awaiting decision on immunosuppression     1/26;      Fatigue/dyspnea: likely due to severe anemia : feels weak  but no bleeding noted:  on 2 L of oxygen : she needs PT OT : got it yesterday    Diarrhea :resolved:   Pulmonary vasculitis  : per rheumatology  : had recent vats surgery : LN biopsy noted: : she never received teat ment for vasculitis except low dose steroids:  she is awaiting bone marrow biopsy prior to induction therapy with rituximab: The ct chest done on this admission does not show pneumothorax and has jean-claude effusions:  There are some new opacities in rigth lower lobe which were not there:  clinically she does not seem to have pneumonia: ID following: could it be a part of vasculitis : her resp status has not changed  : being observed off antibiotics   Bicytopneia and leucocytosis: BM biopsy done: await results for still pending   hx of Tachycardia :  Recent TTE on 11/3/22 reviewed: normal LV. outpt card Dr. Ady Cooper  Anxiety and depression  GERD (gastroesophageal reflux disease) : ppi   Hyperlipidemia.   recent covid  : on last admission she was treated for covid infection:   DVT ppx   dw team: awaiting decision on immunosuppression

## 2023-01-26 NOTE — DIETITIAN INITIAL EVALUATION ADULT - ORAL INTAKE PTA/DIET HISTORY
Patient visited at bedside stated she has lost 40 lbs in past 3 months  due to loss of appetite, interest in eating, loss of  taste, diarrhea, weakness with lethargy,  and illness including covid.  Pt denies nausea, vomiting, diarrhea or constipation at this time. She  had ~50% of breakfast and  stated that was the first "good meal" in a while. Patient dislikes creamy supplements but wishes to continue with Ensure clears. Pt did not eat her meat on entree, discussed alternate protein sources, pt will accept Greek yogurt

## 2023-01-26 NOTE — DIETITIAN NUTRITION RISK NOTIFICATION - TREATMENT: THE FOLLOWING DIET HAS BEEN RECOMMENDED
Diet, Regular:   1200mL Fluid Restriction ([id number])  Supplement Feeding Modality:  Oral  Ensure Clear Cans or Servings Per Day:  1       Frequency:  Three Times a day (01-18-23 @ 21:43) [Active]

## 2023-01-26 NOTE — PROGRESS NOTE ADULT - SUBJECTIVE AND OBJECTIVE BOX
No HA  No N/V  No diarrhea    Vital Signs Last 24 Hrs  T(C): 36.7 (26 Jan 2023 04:45), Max: 36.7 (26 Jan 2023 04:45)  T(F): 98 (26 Jan 2023 04:45), Max: 98 (26 Jan 2023 04:45)  HR: 84 (26 Jan 2023 04:45) (78 - 84)  BP: 130/76 (26 Jan 2023 04:45) (106/62 - 130/76)  BP(mean): --  RR: 18 (26 Jan 2023 04:45) (18 - 18)  SpO2: 92% (26 Jan 2023 04:45) (92% - 95%)    I&O's Summary    01-25-23 @ 07:01  -  01-26-23 @ 07:00  --------------------------------------------------------  IN: 730 mL / OUT: 400 mL / NET: 330 mL        PHYSICAL EXAM:  GENERAL: NAD, well-developed, comfortable on nasal canula  HEAD:  Atraumatic, Normocephalic  EYES: EOMI, PERRLA, conjunctiva and sclera clear  NECK: Supple, No JVD  CHEST/LUNG: mild decrease breath sounds bilaterally; No wheeze   HEART: Regular rate and rhythm; No murmurs, rubs, or gallops  ABDOMEN: Soft, Nontender, Nondistended; Bowel sounds present  Neuro: AAOx3, no focal weakness, 5/5 b/l extremity strength  EXTREMITIES:  2+ Peripheral Pulses, No clubbing, cyanosis, trace b/l edema    LABS:                        8.6    48.45 )-----------( 35       ( 25 Jan 2023 07:18 )             26.9     01-25    138  |  102  |  15  ----------------------------<  53<LL>  4.6   |  21<L>  |  0.69    Ca    9.0      25 Jan 2023 07:18    TPro  6.3  /  Alb  3.4  /  TBili  0.5  /  DBili  x   /  AST  20  /  ALT  6<L>  /  AlkPhos  51  01-25      CAPILLARY BLOOD GLUCOSE      POCT Blood Glucose.: 127 mg/dL (25 Jan 2023 10:29)            RADIOLOGY & ADDITIONAL TESTS:    Imaging Personally Reviewed:  [x] YES  [ ] NO    Will obtain old records:  [ ] YES  [x] NO

## 2023-01-27 NOTE — PROGRESS NOTE ADULT - SUBJECTIVE AND OBJECTIVE BOX
Date of Service: 01-27-23 @ 15:47    Patient is a 82y old  Female who presents with a chief complaint of weakness (27 Jan 2023 11:28)      Any change in ROS: on 2 L of oxygen : seems stable: nosob:     MEDICATIONS  (STANDING):  budesonide 160 MICROgram(s)/formoterol 4.5 MICROgram(s) Inhaler 2 Puff(s) Inhalation two times a day  chlorhexidine 2% Cloths 1 Application(s) Topical daily  cholecalciferol 2000 Unit(s) Oral daily  citalopram 10 milliGRAM(s) Oral daily  fluticasone propionate 50 MICROgram(s)/spray Nasal Spray 1 Spray(s) Both Nostrils two times a day  folic acid 1 milliGRAM(s) Oral daily  guaiFENesin  milliGRAM(s) Oral every 12 hours  influenza  Vaccine (HIGH DOSE) 0.7 milliLiter(s) IntraMuscular once  ipratropium    for Nebulization 500 MICROGram(s) Nebulizer every 6 hours  metoprolol tartrate 25 milliGRAM(s) Oral two times a day  mirtazapine 7.5 milliGRAM(s) Oral daily  pantoprazole    Tablet 40 milliGRAM(s) Oral before breakfast  predniSONE   Tablet 20 milliGRAM(s) Oral daily  senna 1 Tablet(s) Oral daily  sodium bicarbonate 650 milliGRAM(s) Oral three times a day  trimethoprim  160 mG/sulfamethoxazole 800 mG 1 Tablet(s) Oral daily    MEDICATIONS  (PRN):  acetaminophen     Tablet .. 650 milliGRAM(s) Oral every 6 hours PRN Temp greater or equal to 38C (100.4F), Mild Pain (1 - 3)  aluminum hydroxide/magnesium hydroxide/simethicone Suspension 30 milliLiter(s) Oral every 4 hours PRN Dyspepsia  melatonin 3 milliGRAM(s) Oral at bedtime PRN Insomnia  ondansetron Injectable 4 milliGRAM(s) IV Push every 8 hours PRN Nausea and/or Vomiting  polyethylene glycol 3350 17 Gram(s) Oral daily PRN Constipation    Vital Signs Last 24 Hrs  T(C): 36.6 (27 Jan 2023 13:24), Max: 37.1 (27 Jan 2023 04:54)  T(F): 97.8 (27 Jan 2023 13:24), Max: 98.7 (27 Jan 2023 04:54)  HR: 79 (27 Jan 2023 13:24) (79 - 90)  BP: 124/67 (27 Jan 2023 13:24) (122/71 - 131/76)  BP(mean): --  RR: 17 (27 Jan 2023 13:24) (16 - 17)  SpO2: 95% (27 Jan 2023 13:24) (91% - 96%)    Parameters below as of 27 Jan 2023 13:24  Patient On (Oxygen Delivery Method): nasal cannula  O2 Flow (L/min): 3      I&O's Summary    26 Jan 2023 07:01  -  27 Jan 2023 07:00  --------------------------------------------------------  IN: 600 mL / OUT: 200 mL / NET: 400 mL    27 Jan 2023 07:01  -  27 Jan 2023 15:47  --------------------------------------------------------  IN: 500 mL / OUT: 500 mL / NET: 0 mL          Physical Exam:   GENERAL: NAD, well-groomed, well-developed  HEENT: RANJIT/   Atraumatic, Normocephalic  ENMT: No tonsillar erythema, exudates, or enlargement; Moist mucous membranes, Good dentition, No lesions  NECK: Supple, No JVD, Normal thyroid  CHEST/LUNG: scattered cracekls: no wheezing  CVS: Regular rate and rhythm; No murmurs, rubs, or gallops  GI: : Soft, Nontender, Nondistended; Bowel sounds present  NERVOUS SYSTEM:  Alert & Oriented X3  EXTREMITIES:  -edema  LYMPH: No lymphadenopathy noted  SKIN: No rashes or lesions  ENDOCRINOLOGY: No Thyromegaly  PSYCH: Appropriate    Labs:                              8.6    48.45 )-----------( 35       ( 25 Jan 2023 07:18 )             26.9                         8.6    49.81 )-----------( 37       ( 24 Jan 2023 11:29 )             26.3     01-25    138  |  102  |  15  ----------------------------<  53<LL>  4.6   |  21<L>  |  0.69      TPro  6.3  /  Alb  3.4  /  TBili  0.5  /  DBili  x   /  AST  20  /  ALT  6<L>  /  AlkPhos  51  01-25    CAPILLARY BLOOD GLUCOSE                rad< from: CT Abdomen and Pelvis w/ IV Cont (01.18.23 @ 17:19) >  BONES: Multilevel degenerative changes of the spine and chronic appearing   compression fractures, most severe at the T12 vertebral body level.    IMPRESSION:  No pulmonary arterial embolism.    Status post wedge resections in both upper lobes. 2 cm nodular opacity   associated with the staple line in the right upper lobe is noted.   Etiology is unclear.    Multiple ill-defined opacities are noted within both lungs, more so in   the right lower lobe. Exact etiology is unclear.    Bilateral pleural effusions, right more than left.      --- End of Report ---      < end of copied text >        RECENT CULTURES:        RESPIRATORY CULTURES:          Studies  Chest X-RAY  CT SCAN Chest   Venous Dopplers: LE:   CT Abdomen  Others

## 2023-01-27 NOTE — PROGRESS NOTE ADULT - ASSESSMENT
82 yr old female with a PMHx of diffuse large B cell lymphoma in remission, non-hodgkin's lymphoma (chemoport), depression, HLD, GERD, PE/DVT (4/2021 no longer on Eliquis), ANCA-vasculitis (20 y/a, no meds), s/p LVATS, LUIS wedge resection (2021), recent direct admit for RVATS, RUL Nodule Biopsy w/ IR marking in [facility], path favoring vasculitis, treated with Decadron, then switched to PO prednisone, went to [facility]. She presented here from [facility] for elevated WBC and low hemoglobin, severe weakness. Pt states for the past 2-3 days has been having increased weakness and lethargy, decreased appetite. +sputum productive cough. + cui, no sob, no difficulty breathing. No abdominal pain, nausea, vomiting, no bloody stools. Has endorsed multiple episodes of loose stools x weeks, currently being treated for c.diff with oral vancomycin.       Fatigue/dyspnea: likely due to severe anemia  - pt s/p total 2 units PRBC   - no melena, no hematochezia. no BRBPR. occult stool neg.   - repeat post transfusion hgb stable.  - likely poor appetite with iron deficiency vs. r/o other hematological issues  - no clear signs of infection, monitor off abx.   - monitor for fluid overload, she tends to require IV Lasix intermittently post transfusion.  - IV Lasix 20 mg x 1 for basilar crackles post transfusion. stable.   - iron studies (though recent transfusion likely affect results), vit b12, D, folic, TSH.   - resume diet. doubt GI bleed   - Physical therapy. Out of bed to chair with assistance.   - House heme/onc consulted for pancytopenia with elevated WBC. s/p bone marrow bx 1/23/23. path pending  - reconsulted Rheum for follow up treatment of vasculitis  (she is chronically on steroids).   - ID consulted to see if she may need PCP ppx regimen on long term steroids. Started Bactrim pcp ppx.    Diarrhea  - elevated WBC  - no documented c.diff PCR, re-ordered.  - s/p Vanco PO a few days (started in rehab)  - diarrhea completely resolved.   - monitor off PO vanco per ID  - now constipated. PRN bowel regimen started. +BM    Pulmonary vasculitis   - Recent TTE on 11/3/22 reviewed: normal LV. outpt card Dr. Ady Cooper  - outpt pulm Mack Tucker   - s/p thoracoscopic biopsy of mediastinal lymph node and Lung wedge resection 11/10/22  - recent pleural effusion s/p 650 cc U/S-guided rt thoracentesis 11/17/22  - exudative but no neutrophilic predominance and initial Gram stain w/o organisms  - cultures neg.   - path results favoring vasculitis: no evidence for lymphoma. Cytology also came back negative.   - comfortable on nasal canula, better post diuretic last admission.   - rheum and heme-onc following previously, will reconsult.   - last admission, she was not started on immunosuppressants such as cellcept given recent treatment for COVID19 at that time  - c/w prednisone 20 mg qdaily.   - RTX under consideration pending acute hepatitis panel and quantiferon  - ID started PCP ppx with Bactrim.     hx of Tachycardia    - cont low-dose metoprolol, 50 mg to 25 mg dose reduced in rehab.   - card consult (Dr. Hooker) in the past, if needed    Anxiety and depression  - stable, continue citalopram and Remeron.  - Pt denied SI/HI ideations, denied visual and auditory hallucinations.     GERD (gastroesophageal reflux disease)  - continue PPI    Hyperlipidemia.   - continue home ezetimibe. okay to hold if nonformulary     DVT ppx   - holding AC in the setting of anemia, pancytopenia.   - will start Lovenox SQ if pancytopenia/ plts recovers.

## 2023-01-27 NOTE — PROGRESS NOTE ADULT - SUBJECTIVE AND OBJECTIVE BOX
[facility] DIVISION OF INFECTIOUS DISEASES  ANDREW Rodríguez Y. Patel, S. Shah, G. Casimir  [phone]  ([phone] - weekdays after 5pm and weekends)    Name: BETTIE NGUYEN  Age/Gender: 82y Female  MRN: [id number]    Interval History:  Patient seen and examined this morning.   States she feels the same, no new complaints  Notes reviewed.   No concerning overnight events.  Afebrile.     Allergies: codeine (Nausea)  doxycycline (Nausea)  penicillin (Hives)    Objective:  Vitals:   T(F): 98.7 (01-27-23 @ 04:54), Max: 98.7 (01-27-23 @ 04:54)  HR: 90 (01-27-23 @ 04:54) (80 - 94)  BP: 131/76 (01-27-23 @ 04:54) (107/62 - 131/76)  RR: 16 (01-27-23 @ 04:54) (16 - 18)  SpO2: 91% (01-27-23 @ 04:54) (91% - 96%)  Physical Examination:  General: no acute distress, nontoxic appearing  HEENT: NC/AT, EOMI, anicteric, neck supple  Cardio: S1, S2 present, normal rate  Resp: clear to auscultation bilaterally  Abd: soft, NT, ND, + BS  Neuro: AAOx3, no obvious focal deficits  Ext: no edema, no cyanosis, moving extremities  Skin: warm, dry, no visible rash  Psych: appropriate affect and mood for situation  Lines: PIV    Laboratory Studies:  CBC:   WBC Trend:  48.45 01-25-23 @ 07:18  49.81 01-24-23 @ 11:29  44.17 01-22-23 @ 07:13  40.74 01-21-23 @ 07:25    CMP:   Creatinine, Serum: 0.69 mg/dL (01-25-23 @ 07:18)  Creatinine, Serum: 0.55 mg/dL (01-22-23 @ 07:11)  Creatinine, Serum: 0.55 mg/dL (01-21-23 @ 07:22)    Microbiology: reviewed   Culture - Blood (collected 01-18-23 @ 15:50)  Source: .Blood Blood-Peripheral  Final Report (01-23-23 @ 23:01):    No Growth Final    Culture - Blood (collected 01-18-23 @ 15:30)  Source: .Blood Blood-Peripheral  Final Report (01-23-23 @ 23:01):    No Growth Final    Radiology: reviewed     Medications:  acetaminophen     Tablet .. 650 milliGRAM(s) Oral every 6 hours PRN  aluminum hydroxide/magnesium hydroxide/simethicone Suspension 30 milliLiter(s) Oral every 4 hours PRN  budesonide 160 MICROgram(s)/formoterol 4.5 MICROgram(s) Inhaler 2 Puff(s) Inhalation two times a day  chlorhexidine 2% Cloths 1 Application(s) Topical daily  cholecalciferol 2000 Unit(s) Oral daily  citalopram 10 milliGRAM(s) Oral daily  fluticasone propionate 50 MICROgram(s)/spray Nasal Spray 1 Spray(s) Both Nostrils two times a day  folic acid 1 milliGRAM(s) Oral daily  guaiFENesin  milliGRAM(s) Oral every 12 hours  influenza  Vaccine (HIGH DOSE) 0.7 milliLiter(s) IntraMuscular once  ipratropium    for Nebulization 500 MICROGram(s) Nebulizer every 6 hours  melatonin 3 milliGRAM(s) Oral at bedtime PRN  metoprolol tartrate 25 milliGRAM(s) Oral two times a day  mirtazapine 7.5 milliGRAM(s) Oral daily  ondansetron Injectable 4 milliGRAM(s) IV Push every 8 hours PRN  pantoprazole    Tablet 40 milliGRAM(s) Oral before breakfast  polyethylene glycol 3350 17 Gram(s) Oral daily PRN  predniSONE   Tablet 20 milliGRAM(s) Oral daily  senna 1 Tablet(s) Oral daily  sodium bicarbonate 650 milliGRAM(s) Oral three times a day  trimethoprim  160 mG/sulfamethoxazole 800 mG 1 Tablet(s) Oral daily    Current Antimicrobials:  trimethoprim  160 mG/sulfamethoxazole 800 mG 1 Tablet(s) Oral daily    Prior/Completed Antimicrobials:  cefepime   IVPB

## 2023-01-27 NOTE — PROGRESS NOTE ADULT - SUBJECTIVE AND OBJECTIVE BOX
No acute SOB  Calm, not agitated    Vital Signs Last 24 Hrs  T(C): 37.1 (27 Jan 2023 04:54), Max: 37.1 (27 Jan 2023 04:54)  T(F): 98.7 (27 Jan 2023 04:54), Max: 98.7 (27 Jan 2023 04:54)  HR: 90 (27 Jan 2023 04:54) (80 - 94)  BP: 131/76 (27 Jan 2023 04:54) (107/62 - 131/76)  BP(mean): --  RR: 16 (27 Jan 2023 04:54) (16 - 18)  SpO2: 91% (27 Jan 2023 04:54) (91% - 96%)    I&O's Summary    01-26-23 @ 07:01  -  01-27-23 @ 07:00  --------------------------------------------------------  IN: 600 mL / OUT: 200 mL / NET: 400 mL        PHYSICAL EXAM:  GENERAL: NAD, well-developed, comfortable on nasal canula  HEAD:  Atraumatic, Normocephalic  EYES: EOMI, PERRLA, conjunctiva and sclera clear  NECK: Supple, No JVD  CHEST/LUNG: mild decrease breath sounds bilaterally; No wheeze   HEART: Regular rate and rhythm; No murmurs, rubs, or gallops  ABDOMEN: Soft, Nontender, Nondistended; Bowel sounds present  Neuro: AAOx3, no focal weakness, 5/5 b/l extremity strength  EXTREMITIES:  2+ Peripheral Pulses, No clubbing, cyanosis, trace b/l edema    LABS:            CAPILLARY BLOOD GLUCOSE                RADIOLOGY & ADDITIONAL TESTS:    Imaging Personally Reviewed:  [x] YES  [ ] NO    Will obtain old records:  [ ] YES  [x] NO

## 2023-01-27 NOTE — PROGRESS NOTE ADULT - ASSESSMENT
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 She presented here from [facility] for elevated WBC and low hemoglobin, severe weakness. Pt states for the past 2-3 days has been having increased weakness and lethargy, decreased appetite. +sputum productive cough. + cui, no sob, no difficulty breathing. No abdominal pain, nausea, vomiting, no bloody stools. Has endorsed multiple episodes of loose stools x weeks, currently being treated for c.diff with oral vancomycin.       Fatigue/dyspnea: likely due to severe anemia :  pt s/p 1 unit PRB  Diarrhea  Pulmonary vasculitis  :  hx of Tachycardia :  Recent TTE on 11/3/22 reviewed: normal LV. outpt card Dr. Ady Cooper  Anxiety and depression  GERD (gastroesophageal reflux disease)  Hyperlipidemia.   DVT ppx     1/20:  Fatigue/dyspnea: likely due to severe anemia :  pt s/p 1 unit PRBC: hb now  > 10  : she is on 2 L of oxygen : no wheezing: no overt signs of bleeding : ct chest is abnormal report noted:  has jean-claude ill defined opacities of unclear etiology  : venous ph is mild acidotic:  no need for Bipap at this time : monitor and trend hb  Diarrhea : symptomatic rx:  workup per primary team  Pulmonary vasculitis  : per rheumatology  : had recent vats surgery : LN biopsy noted:   hx of Tachycardia :  Recent TTE on 11/3/22 reviewed: normal LV. outpt card Dr. Ady Cooper  Anxiety and depression  GERD (gastroesophageal reflux disease) : ppi   Hyperlipidemia.   recent covid  : o n last admission she was treated for covid infection:   DVT ppx   d wteam    1/22:    Fatigue/dyspnea: likely due to severe anemia :  pt s/p 1 unit PRBC: hb now  > 10  : she is on 2 L of oxygen : no wheezing: no overt signs of bleeding : ct chest is abnormal report noted:  has jean-claude ill defined opacities of unclear etiology  : venous ph is mild acidotic:  no need for Bipap at this time : monitor and trend hb: she remained stable o gracie last 1 days:  she is not on any antibtiocs at this time: RVP  and blood cultures are negative: she had ebus biopsy also before: reviewed: ID following  Diarrhea : symptomatic rx:  workup per primary team : seems to have resolved  Pulmonary vasculitis  : per rheumatology  : had recent vats surgery : LN biopsy noted:   Bicytopneia and leucocytosis: ? etiology  : for possible bone marrow biopsy on Monday    hx of Tachycardia :  Recent TTE on 11/3/22 reviewed: normal LV. outpt card Dr. Ady Cooper  Anxiety and depression  GERD (gastroesophageal reflux disease) : ppi   Hyperlipidemia.   recent covid  : on last admission she was treated for covid infection:   DVT ppx   dw team    1/23:    Fatigue/dyspnea: likely due to severe anemia : feels weak  but no bleeding noted:  on 2 L of oxygen : she needs PT OT   Diarrhea :resolved:   Pulmonary vasculitis  : per rheumatology  : had recent vats surgery : LN biopsy noted: : she never received teat ment for vasculitis except low dose steroids:  she is awaiting bone marrow biopsy prior to induction therapy with rituximab: The ct chest done on this admission does not show pneumothorax and has jean-claude effusions:  There are some new opacites in rigth lower lobe which were not therre:  clinically she does not seem to have pneumonia: ID following: could it be a prt of vasculitis  Bicytopneia and leucocytosis: ? etiology  : for possible bone marrow biopsy today  hx of Tachycardia :  Recent TTE on 11/3/22 reviewed: normal LV. outpt card Dr. Ady Cooper  Anxiety and depression  GERD (gastroesophageal reflux disease) : ppi   Hyperlipidemia.   recent covid  : on last admission she was treated for covid infection:   DVT ppx   dw team    1/24:    Fatigue/dyspnea: likely due to severe anemia : feels weak  but no bleeding noted:  on 2 L of oxygen : she needs PT OT   Diarrhea :resolved:   Pulmonary vasculitis  : per rheumatology  : had recent vats surgery : LN biopsy noted: : she never received teat ment for vasculitis except low dose steroids:  she is awaiting bone marrow biopsy prior to induction therapy with rituximab: The ct chest done on this admission does not show pneumothorax and has jean-claude effusions:  There are some new opacites in rigth lower lobe which were not therre:  clinically she does not seem to have pneumonia: ID following: could it be a prt of vasculitis  Bicytopneia and leucocytosis: BM biopsy done: await results for eventual immunosuppressives therapy:   hx of Tachycardia :  Recent TTE on 11/3/22 reviewed: normal LV. outpt card Dr. Ady Cooper  Anxiety and depression  GERD (gastroesophageal reflux disease) : ppi   Hyperlipidemia.   recent covid  : on last admission she was treated for covid infection:   DVT ppx   dw team    1/25:    Fatigue/dyspnea: likely due to severe anemia : feels weak  but no bleeding noted:  on 2 L of oxygen : she needs PT OT   Diarrhea :resolved:   Pulmonary vasculitis  : per rheumatology  : had recent vats surgery : LN biopsy noted: : she never received teat ment for vasculitis except low dose steroids:  she is awaiting bone marrow biopsy prior to induction therapy with rituximab: The ct chest done on this admission does not show pneumothorax and has jean-claude effusions:  There are some new opacites in rigth lower lobe which were not there:  clinically she does not seem to have pneumonia: ID following: could it be a part of vasculitis : her resp status has not changed   Bicytopneia and leucocytosis: BM biopsy done: await results for still pending   hx of Tachycardia :  Recent TTE on 11/3/22 reviewed: normal LV. outpt card Dr. Ady Cooper  Anxiety and depression  GERD (gastroesophageal reflux disease) : ppi   Hyperlipidemia.   recent covid  : on last admission she was treated for covid infection:   DVT ppx   dw team: awaiting decision on immunosuppression     1/26;      Fatigue/dyspnea: likely due to severe anemia : feels weak  but no bleeding noted:  on 2 L of oxygen : she needs PT OT : got it yesterday    Diarrhea :resolved:   Pulmonary vasculitis  : per rheumatology  : had recent vats surgery : LN biopsy noted: : she never received teat ment for vasculitis except low dose steroids:  she is awaiting bone marrow biopsy prior to induction therapy with rituximab: The ct chest done on this admission does not show pneumothorax and has jean-claude effusions:  There are some new opacities in rigth lower lobe which were not there:  clinically she does not seem to have pneumonia: ID following: could it be a part of vasculitis : her resp status has not changed  : being observed off antibiotics   Bicytopneia and leucocytosis: BM biopsy done: await results for still pending   hx of Tachycardia :  Recent TTE on 11/3/22 reviewed: normal LV. outpt card Dr. Ady Cooper  Anxiety and depression  GERD (gastroesophageal reflux disease) : ppi   Hyperlipidemia.   recent covid  : on last admission she was treated for covid infection:   DVT ppx   dw team: awaiting decision on immunosuppression     1/27:    Fatigue/dyspnea: likely due to severe anemia : feels weak  but no bleeding noted:  on 2 L of oxygen : cont  PT OT :   Diarrhea :resolved:   Pulmonary vasculitis  : per rheumatology  : had recent vats surgery : LN biopsy noted: : she never received teat ment for vasculitis except low dose steroids:  she is awaiting bone marrow biopsy prior to induction therapy with rituximab: The ct chest done on this admission does not show pneumothorax and has jean-claude effusions:  There are some new opacities in rigth lower lobe which were not there:  clinically she does not seem to have pneumonia: ID following: could it be a part of vasculitis : her resp status has not changed  : being observed off antibiotics :  on bactrim prophylaxis as she is on chr steroids   Bicytopneia and leucocytosis: BM biopsy done: await results for still pending   hx of Tachycardia :  Recent TTE on 11/3/22 reviewed: normal LV. outpt card Dr. Ady Cooper  Anxiety and depression  GERD (gastroesophageal reflux disease) : ppi   Hyperlipidemia.   recent covid  : on last admission she was treated for covid infection:   DVT ppx   dw team: awaiting decision on immunosuppression

## 2023-01-27 NOTE — PROGRESS NOTE ADULT - ASSESSMENT
Patient is a 82 year old female with a PMH of diffuse large B cell lymphoma in remission, non-hodgkin's lymphoma (chemoport), depression, HLD, GERD, PE/DVT (4/2021 no longer on Eliquis), ANCA-vasculitis (20 y/a, no meds), s/p LVATS, LUIS wedge resection (2021), recent direct admit for RVATS, RUL Nodule Biopsy w/ IR marking in [facility], path favoring vasculitis, treated with Decadron, then switched to PO prednisone, went to [facility]. She presented here from [facility] for elevated WBC and low hemoglobin, severe weakness. Pt states for the past 2-3 days has been having increased weakness and lethargy, decreased appetite. Reports chronic cough, currently nonproductive - RVP/COVID negative. Has multiple episodes of loose stools x weeks, reported recently trying marijuana for appetite and noted worsening. She was being treated empirically for c.diff with oral vancomycin.     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  Leukocytosis -- unclear etiology - no clear infectious etiology found to date  ?reactive in setting of above and also on steroids   H/o DLBCL, EBV+   - Pulmonary, Rheumatology and Heme/Onc following   - noted patient had similar presentation with bicytopenia and leukocytosis in the past, may need tx with rituximab   - s/p BMBx 1/23   - CT - s/p wedge resections in b/l upper lobes, 2 cm nodular opacity a/w staple line in RUL, ; multiple ill-defined b/l opacities more in RLL -unclear etiology, b/l effusions R>L   - clinically no sign of pneumonia noted, no focal findings on exam   - Prior hx/chart reviewed; cultures were negative at that time    - Bcx here negative x2    Recommendations:  Continue on Bactrim DS 1 tablet daily for PCP ppx while on prednisone  Continue to monitor off abx other than above given pt afebrile, cx negative and no infectious source found  Monitor temps/CBC   - if spikes fever, repeat CXR, blood cultures x2 and can start empiric cefepime       Yevgeniy Malave M.D.  [facility], Division of Infectious Diseases  [phone]  After 5pm on weekdays and all day on weekends - please call [phone]

## 2023-01-28 NOTE — PROGRESS NOTE ADULT - SUBJECTIVE AND OBJECTIVE BOX
No acute SOB  (+)Malaise  No CP    Vital Signs Last 24 Hrs  T(C): 37.9 (28 Jan 2023 05:08), Max: 37.9 (28 Jan 2023 05:08)  T(F): 100.2 (28 Jan 2023 05:08), Max: 100.2 (28 Jan 2023 05:08)  HR: 109 (28 Jan 2023 05:08) (77 - 109)  BP: 132/71 (28 Jan 2023 05:08) (112/63 - 132/71)  BP(mean): --  RR: 18 (28 Jan 2023 05:08) (17 - 18)  SpO2: 95% (28 Jan 2023 05:08) (95% - 95%)    I&O's Summary    01-27-23 @ 07:01  -  01-28-23 @ 07:00  --------------------------------------------------------  IN: 500 mL / OUT: 500 mL / NET: 0 mL        PHYSICAL EXAM:  GENERAL: NAD, well-developed, comfortable on nasal canula  HEAD:  Atraumatic, Normocephalic  EYES: EOMI, PERRLA, conjunctiva and sclera clear  NECK: Supple, No JVD  CHEST/LUNG: mild decrease breath sounds bilaterally; No wheeze   HEART: Regular rate and rhythm; No murmurs, rubs, or gallops  ABDOMEN: Soft, Nontender, Nondistended; Bowel sounds present  Neuro: AAOx3, no focal weakness, 5/5 b/l extremity strength  EXTREMITIES:  2+ Peripheral Pulses, No clubbing, cyanosis, trace b/l edema    LABS:            CAPILLARY BLOOD GLUCOSE                RADIOLOGY & ADDITIONAL TESTS:    Imaging Personally Reviewed:  [x] YES  [ ] NO    Will obtain old records:  [ ] YES  [x] NO

## 2023-01-28 NOTE — PROGRESS NOTE ADULT - ASSESSMENT
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 She presented here from [facility] for elevated WBC and low hemoglobin, severe weakness. Pt states for the past 2-3 days has been having increased weakness and lethargy, decreased appetite. Reports chronic cough, currently nonproductive - RVP/COVID negative. Has multiple episodes of loose stools x weeks, reported recently trying marijuana for appetite and noted worsening. She was being treated empirically for c.diff with oral vancomycin.     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  Leukocytosis -- unclear etiology - no clear infectious etiology found to date  ?reactive in setting of above and also on steroids   H/o DLBCL, EBV+   - Pulmonary, Rheumatology and Heme/Onc following   - noted patient had similar presentation with bicytopenia and leukocytosis in the past, may need tx with rituximab   - s/p BMBx 1/23   - CT - s/p wedge resections in b/l upper lobes, 2 cm nodular opacity a/w staple line in RUL, ; multiple ill-defined b/l opacities more in RLL -unclear etiology, b/l effusions R>L   - clinically no sign of pneumonia noted, no focal findings on exam   - Prior hx/chart reviewed; cultures were negative at that time    - Bcx here negative x2    Recommendations:  Continue on Bactrim DS 1 tablet daily for PCP ppx while on prednisone  Continue to monitor off abx other than above given pt afebrile, cx negative and no infectious source found  Monitor temps/CBC   - if spikes fever, repeat CXR, blood cultures x2 and can start empiric cefepime       Yevgeniy Malave M.D.  [facility], Division of Infectious Diseases  [phone]  After 5pm on weekdays and all day on weekends - please call [phone]

## 2023-01-28 NOTE — PROGRESS NOTE ADULT - ASSESSMENT
82 yr old female with a PMHx of diffuse large B cell lymphoma in remission, non-hodgkin's lymphoma (chemoport), depression, HLD, GERD, PE/DVT (4/2021 no longer on Eliquis), ANCA-vasculitis (20 y/a, no meds), s/p LVATS, LUIS wedge resection (2021), recent direct admit for RVATS, RUL Nodule Biopsy w/ IR marking in [facility], path favoring vasculitis, treated with Decadron, then switched to PO prednisone, went to [facility]. She presented here from [facility] for elevated WBC and low hemoglobin, severe weakness. Pt states for the past 2-3 days has been having increased weakness and lethargy, decreased appetite. +sputum productive cough. + cui, no sob, no difficulty breathing. No abdominal pain, nausea, vomiting, no bloody stools. Has endorsed multiple episodes of loose stools x weeks, currently being treated for c.diff with oral vancomycin.       Fatigue/dyspnea: likely due to severe anemia  - pt s/p total 2 units PRBC   - no melena, no hematochezia. no BRBPR. occult stool neg.   - repeat post transfusion hgb stable.  - likely poor appetite with iron deficiency vs. r/o other hematological issues  - no clear signs of infection, monitor off abx.   - monitor for fluid overload, she tends to require IV Lasix intermittently post transfusion.  - IV Lasix 20 mg x 1 for basilar crackles post transfusion. stable.   - iron studies (though recent transfusion likely affect results), vit b12, D, folic, TSH.   - resume diet. doubt GI bleed   - Physical therapy. Out of bed to chair with assistance.   - House heme/onc consulted for pancytopenia with elevated WBC. s/p bone marrow bx 1/23/23. path pending  - reconsulted Rheum for follow up treatment of vasculitis  (she is chronically on steroids).   - Started Bactrim pcp ppx.    Diarrhea  - elevated WBC  - no documented c.diff PCR, re-ordered.  - s/p Vanco PO a few days (started in rehab)  - diarrhea completely resolved.   - monitor off PO vanco per ID  - now constipated. PRN bowel regimen started. +BM    Pulmonary vasculitis   - Recent TTE on 11/3/22 reviewed: normal LV. outpt card Dr. Ady Cooper  - outpt pulm Mack Tucker   - s/p thoracoscopic biopsy of mediastinal lymph node and Lung wedge resection 11/10/22  - recent pleural effusion s/p 650 cc U/S-guided rt thoracentesis 11/17/22  - exudative but no neutrophilic predominance and initial Gram stain w/o organisms  - cultures neg.   - path results favoring vasculitis: no evidence for lymphoma. Cytology also came back negative.   - comfortable on nasal canula, better post diuretic last admission.   - rheum and heme-onc following previously, will reconsult.   - last admission, she was not started on immunosuppressants such as cellcept given recent treatment for COVID19 at that time  - c/w prednisone 20 mg qdaily.   - RTX under consideration pending acute hepatitis panel and quantiferon  - ID started PCP ppx with Bactrim.     hx of Tachycardia    - cont low-dose metoprolol, 50 mg to 25 mg dose reduced in rehab.   - card consult (Dr. Hooker) in the past, if needed    Anxiety and depression  - stable, continue citalopram and Remeron.  - Pt denied SI/HI ideations, denied visual and auditory hallucinations.     GERD (gastroesophageal reflux disease)  - continue PPI    Hyperlipidemia.   - continue home ezetimibe. okay to hold if nonformulary     DVT ppx   - holding AC in the setting of anemia, pancytopenia.   - will start Lovenox SQ if pancytopenia/ plts recovers.

## 2023-01-28 NOTE — PROGRESS NOTE ADULT - SUBJECTIVE AND OBJECTIVE BOX
[facility] INFECTIOUS DISEASES  ANDREW Rodríguez Y. Patel, S. Shah, G. Casimir  [phone]  ([phone] - weekdays after 5pm and weekends)    Name: BETTIE NGUYEN  Age/Gender: 82y Female  MRN: [id number]    Interval History:  Patient seen and examined this morning.   No new complaints noted.  Notes reviewed  No concerning overnight events  Afebrile   Allergies: codeine (Nausea)  doxycycline (Nausea)  penicillin (Hives)      Objective:  Vitals:   T(F): 97.5 (01-28-23 @ 13:00), Max: 100.2 (01-28-23 @ 05:08)  HR: 84 (01-28-23 @ 13:00) (77 - 109)  BP: 103/61 (01-28-23 @ 13:00) (103/61 - 132/71)  RR: 18 (01-28-23 @ 13:00) (18 - 18)  SpO2: 96% (01-28-23 @ 13:00) (95% - 96%)  Physical Examination:  General: no acute distress  HEENT: NC/AT, anicteric, neck supple  Respiratory: no acc muscle use, breathing comfortably  Cardiovascular: S1 and S2 present  Gastrointestinal: normal appearing, nondistended  Extremities: no edema, no cyanosis  Skin: no visible rash    Laboratory Studies:  CBC:   WBC Trend:  48.45 01-25-23 @ 07:18  49.81 01-24-23 @ 11:29  44.17 01-22-23 @ 07:13    CMP:       Creatinine, Serum: 0.69 mg/dL (01-25-23 @ 07:18)  Creatinine, Serum: 0.55 mg/dL (01-22-23 @ 07:11)    Microbiology: reviewed   Culture - Blood (collected 01-18-23 @ 15:50)  Source: .Blood Blood-Peripheral  Final Report (01-23-23 @ 23:01):    No Growth Final    Culture - Blood (collected 01-18-23 @ 15:30)  Source: .Blood Blood-Peripheral  Final Report (01-23-23 @ 23:01):    No Growth Final    Radiology: reviewed     Medications:  acetaminophen     Tablet .. 650 milliGRAM(s) Oral every 6 hours PRN  aluminum hydroxide/magnesium hydroxide/simethicone Suspension 30 milliLiter(s) Oral every 4 hours PRN  budesonide 160 MICROgram(s)/formoterol 4.5 MICROgram(s) Inhaler 2 Puff(s) Inhalation two times a day  chlorhexidine 2% Cloths 1 Application(s) Topical daily  cholecalciferol 2000 Unit(s) Oral daily  citalopram 10 milliGRAM(s) Oral daily  fluticasone propionate 50 MICROgram(s)/spray Nasal Spray 1 Spray(s) Both Nostrils two times a day  folic acid 1 milliGRAM(s) Oral daily  guaiFENesin  milliGRAM(s) Oral every 12 hours  influenza  Vaccine (HIGH DOSE) 0.7 milliLiter(s) IntraMuscular once  ipratropium    for Nebulization 500 MICROGram(s) Nebulizer every 6 hours  melatonin 3 milliGRAM(s) Oral at bedtime PRN  metoprolol tartrate 25 milliGRAM(s) Oral two times a day  mirtazapine 7.5 milliGRAM(s) Oral daily  ondansetron Injectable 4 milliGRAM(s) IV Push every 8 hours PRN  pantoprazole    Tablet 40 milliGRAM(s) Oral before breakfast  polyethylene glycol 3350 17 Gram(s) Oral daily PRN  predniSONE   Tablet 20 milliGRAM(s) Oral daily  senna 1 Tablet(s) Oral daily  sodium bicarbonate 650 milliGRAM(s) Oral three times a day  trimethoprim  160 mG/sulfamethoxazole 800 mG 1 Tablet(s) Oral daily    Current Antimicrobials:  trimethoprim  160 mG/sulfamethoxazole 800 mG 1 Tablet(s) Oral daily    Prior/Completed Antimicrobials:  cefepime   IVPB

## 2023-01-29 NOTE — PROGRESS NOTE ADULT - ASSESSMENT
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 She presented here from [facility] for elevated WBC and low hemoglobin, severe weakness. Pt states for the past 2-3 days has been having increased weakness and lethargy, decreased appetite. Reports chronic cough, currently nonproductive - RVP/COVID negative. Has multiple episodes of loose stools x weeks, reported recently trying marijuana for appetite and noted worsening. She was being treated empirically for c.diff with oral vancomycin.     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  Leukocytosis -- unclear etiology - no clear infectious etiology found to date  ?reactive in setting of above and also on steroids   H/o DLBCL, EBV+   - Pulmonary, Rheumatology and Heme/Onc following   - noted patient had similar presentation with bicytopenia and leukocytosis in the past, may need tx with rituximab   - s/p BMBx 1/23   - CT - s/p wedge resections in b/l upper lobes, 2 cm nodular opacity a/w staple line in RUL, ; multiple ill-defined b/l opacities more in RLL -unclear etiology, b/l effusions R>L   - clinically no sign of pneumonia noted, no focal findings on exam   - Prior hx/chart reviewed; cultures were negative at that time    - Bcx here negative x2    Recommendations:  Continue on Bactrim DS 1 tablet daily for PCP ppx while on prednisone  Continue to monitor off abx other than above given pt afebrile, cx negative and no infectious source found  Monitor temps/CBC   - if spikes fever, repeat CXR, blood cultures x2 and can start empiric cefepime       Yevgeniy Malave M.D.  [facility], Division of Infectious Diseases  [phone]  After 5pm on weekdays and all day on weekends - please call [phone]

## 2023-01-29 NOTE — PROGRESS NOTE ADULT - SUBJECTIVE AND OBJECTIVE BOX
No fevers overnight  Saturating well on 3LNCO2    Vital Signs Last 24 Hrs  T(C): 36.7 (29 Jan 2023 04:50), Max: 36.7 (29 Jan 2023 04:50)  T(F): 98.1 (29 Jan 2023 04:50), Max: 98.1 (29 Jan 2023 04:50)  HR: 92 (29 Jan 2023 04:50) (84 - 92)  BP: 117/66 (29 Jan 2023 04:50) (103/61 - 117/66)  BP(mean): --  RR: 18 (29 Jan 2023 04:50) (18 - 18)  SpO2: 95% (29 Jan 2023 04:50) (93% - 96%)    I&O's Summary      PHYSICAL EXAM:  GENERAL: NAD, well-developed, comfortable on nasal canula  HEAD:  Atraumatic, Normocephalic  EYES: EOMI, PERRLA, conjunctiva and sclera clear  NECK: Supple, No JVD  CHEST/LUNG: mild decrease breath sounds bilaterally; No wheeze   HEART: Regular rate and rhythm; No murmurs, rubs, or gallops  ABDOMEN: Soft, Nontender, Nondistended; Bowel sounds present  Neuro: AAOx3, no focal weakness, 5/5 b/l extremity strength  EXTREMITIES:  2+ Peripheral Pulses, No clubbing, cyanosis, trace b/l edema    LABS:            CAPILLARY BLOOD GLUCOSE                RADIOLOGY & ADDITIONAL TESTS:    Imaging Personally Reviewed:  [x] YES  [ ] NO    Will obtain old records:  [ ] YES  [x] NO

## 2023-01-29 NOTE — PROGRESS NOTE ADULT - ASSESSMENT
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 She presented here from [facility] for elevated WBC and low hemoglobin, severe weakness. Pt states for the past 2-3 days has been having increased weakness and lethargy, decreased appetite. +sputum productive cough. + cui, no sob, no difficulty breathing. No abdominal pain, nausea, vomiting, no bloody stools. Has endorsed multiple episodes of loose stools x weeks, currently being treated for c.diff with oral vancomycin.       Fatigue/dyspnea: likely due to severe anemia :  pt s/p 1 unit PRB  Diarrhea  Pulmonary vasculitis  :  hx of Tachycardia :  Recent TTE on 11/3/22 reviewed: normal LV. outpt card Dr. Ady Cooper  Anxiety and depression  GERD (gastroesophageal reflux disease)  Hyperlipidemia.   DVT ppx     1/20:  Fatigue/dyspnea: likely due to severe anemia :  pt s/p 1 unit PRBC: hb now  > 10  : she is on 2 L of oxygen : no wheezing: no overt signs of bleeding : ct chest is abnormal report noted:  has jean-claude ill defined opacities of unclear etiology  : venous ph is mild acidotic:  no need for Bipap at this time : monitor and trend hb  Diarrhea : symptomatic rx:  workup per primary team  Pulmonary vasculitis  : per rheumatology  : had recent vats surgery : LN biopsy noted:   hx of Tachycardia :  Recent TTE on 11/3/22 reviewed: normal LV. outpt card Dr. Ady Cooper  Anxiety and depression  GERD (gastroesophageal reflux disease) : ppi   Hyperlipidemia.   recent covid  : o n last admission she was treated for covid infection:   DVT ppx   d wteam    1/22:    Fatigue/dyspnea: likely due to severe anemia :  pt s/p 1 unit PRBC: hb now  > 10  : she is on 2 L of oxygen : no wheezing: no overt signs of bleeding : ct chest is abnormal report noted:  has jean-claude ill defined opacities of unclear etiology  : venous ph is mild acidotic:  no need for Bipap at this time : monitor and trend hb: she remained stable o gracie last 1 days:  she is not on any antibtiocs at this time: RVP  and blood cultures are negative: she had ebus biopsy also before: reviewed: ID following  Diarrhea : symptomatic rx:  workup per primary team : seems to have resolved  Pulmonary vasculitis  : per rheumatology  : had recent vats surgery : LN biopsy noted:   Bicytopneia and leucocytosis: ? etiology  : for possible bone marrow biopsy on Monday    hx of Tachycardia :  Recent TTE on 11/3/22 reviewed: normal LV. outpt card Dr. Ady Cooper  Anxiety and depression  GERD (gastroesophageal reflux disease) : ppi   Hyperlipidemia.   recent covid  : on last admission she was treated for covid infection:   DVT ppx   dw team    1/23:    Fatigue/dyspnea: likely due to severe anemia : feels weak  but no bleeding noted:  on 2 L of oxygen : she needs PT OT   Diarrhea :resolved:   Pulmonary vasculitis  : per rheumatology  : had recent vats surgery : LN biopsy noted: : she never received teat ment for vasculitis except low dose steroids:  she is awaiting bone marrow biopsy prior to induction therapy with rituximab: The ct chest done on this admission does not show pneumothorax and has jean-claude effusions:  There are some new opacites in rigth lower lobe which were not therre:  clinically she does not seem to have pneumonia: ID following: could it be a prt of vasculitis  Bicytopneia and leucocytosis: ? etiology  : for possible bone marrow biopsy today  hx of Tachycardia :  Recent TTE on 11/3/22 reviewed: normal LV. outpt card Dr. Ady Cooper  Anxiety and depression  GERD (gastroesophageal reflux disease) : ppi   Hyperlipidemia.   recent covid  : on last admission she was treated for covid infection:   DVT ppx   dw team    1/24:    Fatigue/dyspnea: likely due to severe anemia : feels weak  but no bleeding noted:  on 2 L of oxygen : she needs PT OT   Diarrhea :resolved:   Pulmonary vasculitis  : per rheumatology  : had recent vats surgery : LN biopsy noted: : she never received teat ment for vasculitis except low dose steroids:  she is awaiting bone marrow biopsy prior to induction therapy with rituximab: The ct chest done on this admission does not show pneumothorax and has jean-claude effusions:  There are some new opacites in rigth lower lobe which were not therre:  clinically she does not seem to have pneumonia: ID following: could it be a prt of vasculitis  Bicytopneia and leucocytosis: BM biopsy done: await results for eventual immunosuppressives therapy:   hx of Tachycardia :  Recent TTE on 11/3/22 reviewed: normal LV. outpt card Dr. Ady Cooper  Anxiety and depression  GERD (gastroesophageal reflux disease) : ppi   Hyperlipidemia.   recent covid  : on last admission she was treated for covid infection:   DVT ppx   dw team    1/25:    Fatigue/dyspnea: likely due to severe anemia : feels weak  but no bleeding noted:  on 2 L of oxygen : she needs PT OT   Diarrhea :resolved:   Pulmonary vasculitis  : per rheumatology  : had recent vats surgery : LN biopsy noted: : she never received teat ment for vasculitis except low dose steroids:  she is awaiting bone marrow biopsy prior to induction therapy with rituximab: The ct chest done on this admission does not show pneumothorax and has jean-claude effusions:  There are some new opacites in rigth lower lobe which were not there:  clinically she does not seem to have pneumonia: ID following: could it be a part of vasculitis : her resp status has not changed   Bicytopneia and leucocytosis: BM biopsy done: await results for still pending   hx of Tachycardia :  Recent TTE on 11/3/22 reviewed: normal LV. outpt card Dr. Ady Cooper  Anxiety and depression  GERD (gastroesophageal reflux disease) : ppi   Hyperlipidemia.   recent covid  : on last admission she was treated for covid infection:   DVT ppx   dw team: awaiting decision on immunosuppression     1/26;      Fatigue/dyspnea: likely due to severe anemia : feels weak  but no bleeding noted:  on 2 L of oxygen : she needs PT OT : got it yesterday    Diarrhea :resolved:   Pulmonary vasculitis  : per rheumatology  : had recent vats surgery : LN biopsy noted: : she never received teat ment for vasculitis except low dose steroids:  she is awaiting bone marrow biopsy prior to induction therapy with rituximab: The ct chest done on this admission does not show pneumothorax and has jean-claude effusions:  There are some new opacities in rigth lower lobe which were not there:  clinically she does not seem to have pneumonia: ID following: could it be a part of vasculitis : her resp status has not changed  : being observed off antibiotics   Bicytopneia and leucocytosis: BM biopsy done: await results for still pending   hx of Tachycardia :  Recent TTE on 11/3/22 reviewed: normal LV. outpt card Dr. Ady Cooper  Anxiety and depression  GERD (gastroesophageal reflux disease) : ppi   Hyperlipidemia.   recent covid  : on last admission she was treated for covid infection:   DVT ppx   dw team: awaiting decision on immunosuppression     1/27:    Fatigue/dyspnea: likely due to severe anemia : feels weak  but no bleeding noted:  on 2 L of oxygen : cont  PT OT :   Diarrhea :resolved:   Pulmonary vasculitis  : per rheumatology  : had recent vats surgery : LN biopsy noted: : she never received teat ment for vasculitis except low dose steroids:  she is awaiting bone marrow biopsy prior to induction therapy with rituximab: The ct chest done on this admission does not show pneumothorax and has jean-claude effusions:  There are some new opacities in rigth lower lobe which were not there:  clinically she does not seem to have pneumonia: ID following: could it be a part of vasculitis : her resp status has not changed  : being observed off antibiotics :  on bactrim prophylaxis as she is on chr steroids   Bicytopneia and leucocytosis: BM biopsy done: await results for still pending   hx of Tachycardia :  Recent TTE on 11/3/22 reviewed: normal LV. outpt card Dr. Ady Cooper  Anxiety and depression  GERD (gastroesophageal reflux disease) : ppi   Hyperlipidemia.   recent covid  : on last admission she was treated for covid infection:   DVT ppx   dw team: awaiting decision on immunosuppression     1/29:    Fatigue/dyspnea: likely due to severe anemia : feels weak  but no bleeding noted:  on 2 L of oxygen : cont  PT OT :   Diarrhea :resolved:   Pulmonary vasculitis  : per rheumatology  : had recent vats surgery : LN biopsy noted: : she never received treat ment for vasculitis except low dose steroids:  she is awaiting bone marrow biopsy prior to induction therapy with rituximab: The ct chest done on this admission does not show pneumothorax and has jean-claude effusions:  There are some new opacities in rigth lower lobe which were not there:  clinically she does not seem to have pneumonia: ID following: could it be a part of vasculitis : her resp status has not changed  : being observed off antibiotics :  on bactrim prophylaxis as she is on chr steroids   Bicytopneia and leucocytosis: BM biopsy: results reviewed defer to [facility]  hx of Tachycardia :  Recent TTE on 11/3/22 reviewed: normal LV.   Anxiety and depression  GERD (gastroesophageal reflux disease) : ppi   Hyperlipidemia.   recent covid  : on last admission she was treated for covid infection:   DVT ppx   dw team: awaiting decision on immunosuppression

## 2023-01-29 NOTE — PROGRESS NOTE ADULT - SUBJECTIVE AND OBJECTIVE BOX
Date of Service: 01-29-23 @ 13:11    Patient is a 82y old  Female who presents with a chief complaint of weakness (29 Jan 2023 09:01)      Any change in ROS:  seems OK: no sob:      MEDICATIONS  (STANDING):  budesonide 160 MICROgram(s)/formoterol 4.5 MICROgram(s) Inhaler 2 Puff(s) Inhalation two times a day  chlorhexidine 2% Cloths 1 Application(s) Topical daily  cholecalciferol 2000 Unit(s) Oral daily  citalopram 10 milliGRAM(s) Oral daily  fluticasone propionate 50 MICROgram(s)/spray Nasal Spray 1 Spray(s) Both Nostrils two times a day  folic acid 1 milliGRAM(s) Oral daily  guaiFENesin  milliGRAM(s) Oral every 12 hours  influenza  Vaccine (HIGH DOSE) 0.7 milliLiter(s) IntraMuscular once  ipratropium    for Nebulization 500 MICROGram(s) Nebulizer every 6 hours  metoprolol tartrate 25 milliGRAM(s) Oral two times a day  mirtazapine 7.5 milliGRAM(s) Oral daily  pantoprazole    Tablet 40 milliGRAM(s) Oral before breakfast  predniSONE   Tablet 20 milliGRAM(s) Oral daily  senna 1 Tablet(s) Oral daily  sodium bicarbonate 650 milliGRAM(s) Oral three times a day  trimethoprim  160 mG/sulfamethoxazole 800 mG 1 Tablet(s) Oral daily    MEDICATIONS  (PRN):  acetaminophen     Tablet .. 650 milliGRAM(s) Oral every 6 hours PRN Temp greater or equal to 38C (100.4F), Mild Pain (1 - 3)  aluminum hydroxide/magnesium hydroxide/simethicone Suspension 30 milliLiter(s) Oral every 4 hours PRN Dyspepsia  melatonin 3 milliGRAM(s) Oral at bedtime PRN Insomnia  ondansetron Injectable 4 milliGRAM(s) IV Push every 8 hours PRN Nausea and/or Vomiting  polyethylene glycol 3350 17 Gram(s) Oral daily PRN Constipation    Vital Signs Last 24 Hrs  T(C): 36.7 (29 Jan 2023 04:50), Max: 36.7 (29 Jan 2023 04:50)  T(F): 98.1 (29 Jan 2023 04:50), Max: 98.1 (29 Jan 2023 04:50)  HR: 92 (29 Jan 2023 04:50) (90 - 92)  BP: 117/66 (29 Jan 2023 04:50) (112/69 - 117/66)  BP(mean): --  RR: 18 (29 Jan 2023 11:58) (18 - 18)  SpO2: 94% (29 Jan 2023 11:58) (93% - 95%)    Parameters below as of 29 Jan 2023 11:58  Patient On (Oxygen Delivery Method): nasal cannula  O2 Flow (L/min): 2      I&O's Summary        Physical Exam:   GENERAL: NAD, well-groomed, well-developed  HEENT: RANJIT/   Atraumatic, Normocephalic  ENMT: No tonsillar erythema, exudates, or enlargement; Moist mucous membranes, Good dentition, No lesions  NECK: Supple, No JVD, Normal thyroid  CHEST/LUNG: Clear to auscultaion- no wheezing  CVS: Regular rate and rhythm; No murmurs, rubs, or gallops  GI: : Soft, Nontender, Nondistended; Bowel sounds present  NERVOUS SYSTEM:  Alert & Oriented X3  EXTREMITIES: -edema  LYMPH: No lymphadenopathy noted  SKIN: No rashes or lesions  ENDOCRINOLOGY: No Thyromegaly  PSYCH: calm     Labs:                CAPILLARY BLOOD GLUCOSE            ra< from: CT Angio Chest PE Protocol w/ IV Cont (01.18.23 @ 17:19) >  compression fractures, most severe at the T12 vertebral body level.    IMPRESSION:  No pulmonary arterial embolism.    Status post wedge resections in both upper lobes. 2 cm nodular opacity   associated with the staple line in the right upper lobe is noted.   Etiology is unclear.    Multiple ill-defined opacities are noted within both lungs, more so in   the right lower lobe. Exact etiology is unclear.    Bilateral pleural effusions, right more than left.      --- End of Report ---    < end of copied text >            RECENT CULTURES:        RESPIRATORY CULTURES:          Studies  Chest X-RAY  CT SCAN Chest   Venous Dopplers: LE:   CT Abdomen  Others

## 2023-01-29 NOTE — PROGRESS NOTE ADULT - SUBJECTIVE AND OBJECTIVE BOX
[facility]EASES  ANDREW Rodríguez Y. Patel, S. Shah, G. Casimir  [phone]  ([phone] - weekdays after 5pm and weekends)    Name: BETTIE NGUYEN  Age/Gender: 82y Female  MRN: [id number]    Interval History:  Patient seen and examined this morning.   No new complaints noted.  Notes reviewed  No concerning overnight events  Afebrile     Allergies: codeine (Nausea)  doxycycline (Nausea)  penicillin (Hives)      Objective:  Vitals:   T(F): 97.8 (01-29-23 @ 13:31), Max: 98.1 (01-29-23 @ 04:50)  HR: 94 (01-29-23 @ 13:31) (90 - 94)  BP: 106/65 (01-29-23 @ 13:31) (106/65 - 117/66)  RR: 18 (01-29-23 @ 13:31) (18 - 20)  SpO2: 97% (01-29-23 @ 13:31) (85% - 97%)  Physical Examination:  General: no acute distress, nontoxic appearing  HEENT: NC/AT, anicteric, neck supple  Respiratory: no acc muscle use, breathing comfortably  Cardiovascular: S1 and S2 present  Gastrointestinal: normal appearing, nondistended  Extremities: no edema, no cyanosis  Skin: no visible rash    Laboratory Studies:  CBC:   WBC Trend:  48.45 01-25-23 @ 07:18  49.81 01-24-23 @ 11:29    CMP:     Creatinine, Serum: 0.69 mg/dL (01-25-23 @ 07:18)    Microbiology: reviewed   Culture - Blood (collected 01-18-23 @ 15:50)  Source: .Blood Blood-Peripheral  Final Report (01-23-23 @ 23:01):    No Growth Final    Culture - Blood (collected 01-18-23 @ 15:30)  Source: .Blood Blood-Peripheral  Final Report (01-23-23 @ 23:01):    No Growth Final    Radiology: reviewed     Medications:  acetaminophen     Tablet .. 650 milliGRAM(s) Oral every 6 hours PRN  aluminum hydroxide/magnesium hydroxide/simethicone Suspension 30 milliLiter(s) Oral every 4 hours PRN  budesonide 160 MICROgram(s)/formoterol 4.5 MICROgram(s) Inhaler 2 Puff(s) Inhalation two times a day  chlorhexidine 2% Cloths 1 Application(s) Topical daily  cholecalciferol 2000 Unit(s) Oral daily  citalopram 10 milliGRAM(s) Oral daily  fluticasone propionate 50 MICROgram(s)/spray Nasal Spray 1 Spray(s) Both Nostrils two times a day  folic acid 1 milliGRAM(s) Oral daily  guaiFENesin  milliGRAM(s) Oral every 12 hours  influenza  Vaccine (HIGH DOSE) 0.7 milliLiter(s) IntraMuscular once  ipratropium    for Nebulization 500 MICROGram(s) Nebulizer every 6 hours  melatonin 3 milliGRAM(s) Oral at bedtime PRN  metoprolol tartrate 25 milliGRAM(s) Oral two times a day  mirtazapine 7.5 milliGRAM(s) Oral daily  ondansetron Injectable 4 milliGRAM(s) IV Push every 8 hours PRN  pantoprazole    Tablet 40 milliGRAM(s) Oral before breakfast  polyethylene glycol 3350 17 Gram(s) Oral daily PRN  predniSONE   Tablet 20 milliGRAM(s) Oral daily  senna 1 Tablet(s) Oral daily  sodium bicarbonate 650 milliGRAM(s) Oral three times a day  trimethoprim  160 mG/sulfamethoxazole 800 mG 1 Tablet(s) Oral daily    Current Antimicrobials:  trimethoprim  160 mG/sulfamethoxazole 800 mG 1 Tablet(s) Oral daily    Prior/Completed Antimicrobials:  cefepime   IVPB

## 2023-01-30 NOTE — PROGRESS NOTE ADULT - ATTENDING COMMENTS
Patient had a prior history of B cell lymphoma treated by Dr Madrigal approximately 2 years ago. Now she presenting with leucocytosis and decreased platelet count; bone marrow biopsy shows myelodysplasia with a blast count greater than 15 %; The peripheral blood smear shows numerous abnormal appearing monocytic cells; Will review the peripheral blood flow cytometry

## 2023-01-30 NOTE — PROGRESS NOTE ADULT - ASSESSMENT
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 She presented here from [facility] for elevated WBC and low hemoglobin, severe weakness. Pt states for the past 2-3 days has been having increased weakness and lethargy, decreased appetite. +sputum productive cough. + cui, no sob, no difficulty breathing. No abdominal pain, nausea, vomiting, no bloody stools. Has endorsed multiple episodes of loose stools x weeks, currently being treated for c.diff with oral vancomycin.       Fatigue/dyspnea: likely due to severe anemia :  pt s/p 1 unit PRB  Diarrhea  Pulmonary vasculitis  :  hx of Tachycardia :  Recent TTE on 11/3/22 reviewed: normal LV. outpt card Dr. Ady Cooper  Anxiety and depression  GERD (gastroesophageal reflux disease)  Hyperlipidemia.   DVT ppx     1/20:  Fatigue/dyspnea: likely due to severe anemia :  pt s/p 1 unit PRBC: hb now  > 10  : she is on 2 L of oxygen : no wheezing: no overt signs of bleeding : ct chest is abnormal report noted:  has jean-claude ill defined opacities of unclear etiology  : venous ph is mild acidotic:  no need for Bipap at this time : monitor and trend hb  Diarrhea : symptomatic rx:  workup per primary team  Pulmonary vasculitis  : per rheumatology  : had recent vats surgery : LN biopsy noted:   hx of Tachycardia :  Recent TTE on 11/3/22 reviewed: normal LV. outpt card Dr. Ady Cooper  Anxiety and depression  GERD (gastroesophageal reflux disease) : ppi   Hyperlipidemia.   recent covid  : o n last admission she was treated for covid infection:   DVT ppx   d wteam    1/22:    Fatigue/dyspnea: likely due to severe anemia :  pt s/p 1 unit PRBC: hb now  > 10  : she is on 2 L of oxygen : no wheezing: no overt signs of bleeding : ct chest is abnormal report noted:  has jean-claude ill defined opacities of unclear etiology  : venous ph is mild acidotic:  no need for Bipap at this time : monitor and trend hb: she remained stable o gracie last 1 days:  she is not on any antibtiocs at this time: RVP  and blood cultures are negative: she had ebus biopsy also before: reviewed: ID following  Diarrhea : symptomatic rx:  workup per primary team : seems to have resolved  Pulmonary vasculitis  : per rheumatology  : had recent vats surgery : LN biopsy noted:   Bicytopneia and leucocytosis: ? etiology  : for possible bone marrow biopsy on Monday    hx of Tachycardia :  Recent TTE on 11/3/22 reviewed: normal LV. outpt card Dr. Ady Cooper  Anxiety and depression  GERD (gastroesophageal reflux disease) : ppi   Hyperlipidemia.   recent covid  : on last admission she was treated for covid infection:   DVT ppx   dw team    1/23:    Fatigue/dyspnea: likely due to severe anemia : feels weak  but no bleeding noted:  on 2 L of oxygen : she needs PT OT   Diarrhea :resolved:   Pulmonary vasculitis  : per rheumatology  : had recent vats surgery : LN biopsy noted: : she never received teat ment for vasculitis except low dose steroids:  she is awaiting bone marrow biopsy prior to induction therapy with rituximab: The ct chest done on this admission does not show pneumothorax and has jean-claude effusions:  There are some new opacites in rigth lower lobe which were not therre:  clinically she does not seem to have pneumonia: ID following: could it be a prt of vasculitis  Bicytopneia and leucocytosis: ? etiology  : for possible bone marrow biopsy today  hx of Tachycardia :  Recent TTE on 11/3/22 reviewed: normal LV. outpt card Dr. Ady Cooper  Anxiety and depression  GERD (gastroesophageal reflux disease) : ppi   Hyperlipidemia.   recent covid  : on last admission she was treated for covid infection:   DVT ppx   dw team    1/24:    Fatigue/dyspnea: likely due to severe anemia : feels weak  but no bleeding noted:  on 2 L of oxygen : she needs PT OT   Diarrhea :resolved:   Pulmonary vasculitis  : per rheumatology  : had recent vats surgery : LN biopsy noted: : she never received teat ment for vasculitis except low dose steroids:  she is awaiting bone marrow biopsy prior to induction therapy with rituximab: The ct chest done on this admission does not show pneumothorax and has jean-claude effusions:  There are some new opacites in rigth lower lobe which were not therre:  clinically she does not seem to have pneumonia: ID following: could it be a prt of vasculitis  Bicytopneia and leucocytosis: BM biopsy done: await results for eventual immunosuppressives therapy:   hx of Tachycardia :  Recent TTE on 11/3/22 reviewed: normal LV. outpt card Dr. Ady Cooper  Anxiety and depression  GERD (gastroesophageal reflux disease) : ppi   Hyperlipidemia.   recent covid  : on last admission she was treated for covid infection:   DVT ppx   dw team    1/25:    Fatigue/dyspnea: likely due to severe anemia : feels weak  but no bleeding noted:  on 2 L of oxygen : she needs PT OT   Diarrhea :resolved:   Pulmonary vasculitis  : per rheumatology  : had recent vats surgery : LN biopsy noted: : she never received teat ment for vasculitis except low dose steroids:  she is awaiting bone marrow biopsy prior to induction therapy with rituximab: The ct chest done on this admission does not show pneumothorax and has jean-claude effusions:  There are some new opacites in rigth lower lobe which were not there:  clinically she does not seem to have pneumonia: ID following: could it be a part of vasculitis : her resp status has not changed   Bicytopneia and leucocytosis: BM biopsy done: await results for still pending   hx of Tachycardia :  Recent TTE on 11/3/22 reviewed: normal LV. outpt card Dr. Ady Cooper  Anxiety and depression  GERD (gastroesophageal reflux disease) : ppi   Hyperlipidemia.   recent covid  : on last admission she was treated for covid infection:   DVT ppx   dw team: awaiting decision on immunosuppression     1/26;      Fatigue/dyspnea: likely due to severe anemia : feels weak  but no bleeding noted:  on 2 L of oxygen : she needs PT OT : got it yesterday    Diarrhea :resolved:   Pulmonary vasculitis  : per rheumatology  : had recent vats surgery : LN biopsy noted: : she never received teat ment for vasculitis except low dose steroids:  she is awaiting bone marrow biopsy prior to induction therapy with rituximab: The ct chest done on this admission does not show pneumothorax and has jean-claude effusions:  There are some new opacities in rigth lower lobe which were not there:  clinically she does not seem to have pneumonia: ID following: could it be a part of vasculitis : her resp status has not changed  : being observed off antibiotics   Bicytopneia and leucocytosis: BM biopsy done: await results for still pending   hx of Tachycardia :  Recent TTE on 11/3/22 reviewed: normal LV. outpt card Dr. Ady Cooper  Anxiety and depression  GERD (gastroesophageal reflux disease) : ppi   Hyperlipidemia.   recent covid  : on last admission she was treated for covid infection:   DVT ppx   dw team: awaiting decision on immunosuppression     1/27:    Fatigue/dyspnea: likely due to severe anemia : feels weak  but no bleeding noted:  on 2 L of oxygen : cont  PT OT :   Diarrhea :resolved:   Pulmonary vasculitis  : per rheumatology  : had recent vats surgery : LN biopsy noted: : she never received teat ment for vasculitis except low dose steroids:  she is awaiting bone marrow biopsy prior to induction therapy with rituximab: The ct chest done on this admission does not show pneumothorax and has jean-claude effusions:  There are some new opacities in rigth lower lobe which were not there:  clinically she does not seem to have pneumonia: ID following: could it be a part of vasculitis : her resp status has not changed  : being observed off antibiotics :  on bactrim prophylaxis as she is on chr steroids   Bicytopneia and leucocytosis: BM biopsy done: await results for still pending   hx of Tachycardia :  Recent TTE on 11/3/22 reviewed: normal LV. outpt card Dr. Ady Cooper  Anxiety and depression  GERD (gastroesophageal reflux disease) : ppi   Hyperlipidemia.   recent covid  : on last admission she was treated for covid infection:   DVT ppx   dw team: awaiting decision on immunosuppression     1/29:    Fatigue/dyspnea: likely due to severe anemia : feels weak  but no bleeding noted:  on 2 L of oxygen : cont  PT OT :   Diarrhea :resolved:   Pulmonary vasculitis  : per rheumatology  : had recent vats surgery : LN biopsy noted: : she never received treat ment for vasculitis except low dose steroids:  she is awaiting bone marrow biopsy prior to induction therapy with rituximab: The ct chest done on this admission does not show pneumothorax and has jean-claude effusions:  There are some new opacities in rigth lower lobe which were not there:  clinically she does not seem to have pneumonia: ID following: could it be a part of vasculitis : her resp status has not changed  : being observed off antibiotics :  on bactrim prophylaxis as she is on chr steroids   Bicytopneia and leucocytosis: BM biopsy: results reviewed defer to [facility]  hx of Tachycardia :  Recent TTE on 11/3/22 reviewed: normal LV.   Anxiety and depression  GERD (gastroesophageal reflux disease) : ppi   Hyperlipidemia.   recent covid  : on last admission she was treated for covid infection:   DVT ppx   dw team: awaiting decision on immunosuppression     1/30:    Fatigue/dyspnea: likely due to severe anemia : feels weak  but no bleeding noted:  on 2 L of oxygen : cont  PT OT : sitting in chair today   Diarrhea :resolved:   Pulmonary vasculitis  : per rheumatology  : had recent vats surgery : LN biopsy noted: : she never received treat ment for vasculitis except low dose steroids:  she is awaiting bone marrow biopsy prior to induction therapy with rituximab: The ct chest done on this admission does not show pneumothorax and has jean-claude effusions:  There are some new opacities in rigth lower lobe which were not there:  clinically she does not seem to have pneumonia: ID following: could it be a part of vasculitis : her resp status has not changed  : being observed off antibiotics :  on bactrim prophylaxis as she is on chr steroids  /l however she had low grade tempt today : rvp is negative : cultures sent: ID follow up   Bicytopneia and leucocytosis: BM biopsy: results reviewed defer to [facility]  hx of Tachycardia :  Recent TTE on 11/3/22 reviewed: normal LV.   Anxiety and depression  GERD (gastroesophageal reflux disease) : ppi   Hyperlipidemia.   recent covid  : on last admission she was treated for covid infection:   DVT ppx   dw team: awaiting decision on immunosuppression

## 2023-01-30 NOTE — PROGRESS NOTE ADULT - ASSESSMENT
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 Hematology consulted for further work up.     # New diagnosis MDS with EB  # Hx of DLBCL EBV+  - Follows with Dr Madrigal, s/p R-mini-chop in 2021   - Recent bone marrow biopsy for new anemia in Nov /2022 did not show any disease recurrence.   - CBC at admission with anemia of 5.9; platelet count of 68, wbc of 45.18. Review of records, patient with anemia since at least october 2022, however thrombocytopenia and leukocytosis is new.   - Received pRBC transfusion and responded appropriately, platelets downtrending ~30k  - Iron panel consistent AOCD; Ferritin elevated at 5768; B12 elevated, folate>20   - Coags elevated, fibrinogen pending  - CT C/A/P with contrast without signs of LAD however, Multiple ill-defined opacities are noted within both lungs, more so in the right lower lobe. Exact etiology is unclear. B/L pleural effusions R>L  - reticulocyte count wnl,  elevated from 200s prior, procal mildly elevated .16  - RVP/covid negative, Bcx ngtd, no UA/Ucx available to review  - peripheral smear reviewed by hematology team during the initial consultation after this admission: no blasts, but immature WBCs noted, no schistocytes, thrombocytopenia noted   - Bone marrow biopsy 1/23/2023; Pathology consistent with MDS- EB 10-15% blasts . Will discuss with outpatient oncologist for plan.   - Supportive transfusions for plts <10 if afebrile, <15 if febrile, 50 if bleeding, hbg <7     # Low grade fever   - 100.9 this am   - started on empiric cefepime   - ID following     # ANCA vasculitis:   - Followed by rheum   - On chronic steroids - 20mg prednisone     Sunita Carson MD   PGY5 heme onc fellow

## 2023-01-30 NOTE — PROGRESS NOTE ADULT - ASSESSMENT
82 yr old female with a PMHx of diffuse large B cell lymphoma in remission, non-hodgkin's lymphoma (chemoport), depression, HLD, GERD, PE/DVT (4/2021 no longer on Eliquis), ANCA-vasculitis (20 y/a, no meds), s/p LVATS, LUIS wedge resection (2021), recent direct admit for RVATS, RUL Nodule Biopsy w/ IR marking in [facility], path favoring vasculitis, treated with Decadron, then switched to PO prednisone, went to [facility]. She presented here from [facility] for elevated WBC and low hemoglobin, severe weakness. Pt states for the past 2-3 days has been having increased weakness and lethargy, decreased appetite. +sputum productive cough. + cui, no sob, no difficulty breathing. No abdominal pain, nausea, vomiting, no bloody stools. Has endorsed multiple episodes of loose stools x weeks, currently being treated for c.diff with oral vancomycin.       Fatigue/dyspnea: likely due to severe anemia  - pt s/p total 2 units PRBC   - no melena, no hematochezia. no BRBPR. occult stool neg.   - repeat post transfusion hgb stable.  - likely poor appetite with iron deficiency vs. r/o other hematological issues  - no clear signs of infection, monitor off abx.   - monitor for fluid overload, she tends to require IV Lasix intermittently post transfusion.  - IV Lasix 20 mg x 1 for basilar crackles post transfusion. stable.   - iron studies (though recent transfusion likely affect results), vit b12, D, folic, TSH.   - resume diet. doubt GI bleed   - Physical therapy. Out of bed to chair with assistance.     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  - s/p Vanco PO a few days (started in rehab)  - diarrhea completely resolved.   - monitor off PO vanco per ID  - now constipated. PRN bowel regimen started. +BM    Pulmonary vasculitis   - Recent TTE on 11/3/22 reviewed: normal LV. outpt card Dr. Ady Cooper  - outpt pulm Mack Tucker   - s/p thoracoscopic biopsy of mediastinal lymph node and Lung wedge resection 11/10/22  - recent pleural effusion s/p 650 cc U/S-guided rt thoracentesis 11/17/22  - exudative but no neutrophilic predominance and initial Gram stain w/o organisms  - cultures neg.   - path results favoring vasculitis: no evidence for lymphoma. Cytology also came back negative.   - comfortable on nasal canula, better post diuretic last admission.   - rheum and heme-onc following previously, will reconsult.   - last admission, she was not started on immunosuppressants such as cellcept given recent treatment for COVID19 at that time  - c/w prednisone 20 mg qdaily.   - RTX under consideration --> acute hepatitis panel (-) and quantiferon indeterminate  - ID started PCP ppx with Bactrim.     hx of Tachycardia    - cont low-dose metoprolol, 50 mg to 25 mg dose reduced in rehab.   - card consult (Dr. Hooker) in the past, if needed    Anxiety and depression  - stable, continue citalopram and Remeron.  - Pt denied SI/HI ideations, denied visual and auditory hallucinations.     GERD (gastroesophageal reflux disease)  - continue PPI    Hyperlipidemia.   - continue home ezetimibe. okay to hold if nonformulary     DVT ppx   - holding AC in the setting of anemia, pancytopenia.   - will start Lovenox SQ if pancytopenia/ plts recovers.

## 2023-01-30 NOTE — PROGRESS NOTE ADULT - ASSESSMENT
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 She presented here from [facility] for elevated WBC and low hemoglobin, severe weakness. Pt states for the past 2-3 days has been having increased weakness and lethargy, decreased appetite. Reports chronic cough, currently nonproductive - RVP/COVID negative. Has multiple episodes of loose stools x weeks, reported recently trying marijuana for appetite and noted worsening. She was being treated empirically for c.diff with oral vancomycin.     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  Leukocytosis -- unclear etiology - no clear infectious etiology found to date  ?reactive in setting of above and also on steroids   H/o DLBCL, EBV+   - Pulmonary, Rheumatology and Heme/Onc following   - noted patient had similar presentation with bicytopenia and leukocytosis in the past, may need tx with rituximab   - s/p BMBx 1/23   - CT - s/p wedge resections in b/l upper lobes, 2 cm nodular opacity a/w staple line in RUL, ; multiple ill-defined b/l opacities more in RLL -unclear etiology, b/l effusions R>L   - clinically no sign of pneumonia noted, no focal findings on exam   - Prior hx/chart reviewed; cultures were negative at that time    - Bcx here negative x2  1/30 -febrile to 100.9F earlier this am, no new sx, no new focal findings on exam   - RVP/COVID negative    - CXR imaging reviewed - appears similar to last on 1/18, states cough unchanged   - started on empiric cefepime; pt refusing blood work but after discussing agrees     Recommendations:  Send blood cultures x2   Follow up CXR official report  Continue on empiric cefepime for now pending above   Continue on Bactrim DS 1 tablet daily for PCP ppx while on prednisone  Monitor temps/CBC      Yevgeniy Malave M.D.  [facility], Division of Infectious Diseases  [phone]  After 5pm on weekdays and all day on weekends - please call [phone]

## 2023-01-30 NOTE — PROGRESS NOTE ADULT - SUBJECTIVE AND OBJECTIVE BOX
100.9 temp @ 5:05 AM   She experienced transient chills but no other new symptoms    Vital Signs Last 24 Hrs  T(C): 36.7 (30 Jan 2023 06:03), Max: 38.3 (30 Jan 2023 05:05)  T(F): 98 (30 Jan 2023 06:03), Max: 100.9 (30 Jan 2023 05:05)  HR: 85 (30 Jan 2023 05:05) (81 - 94)  BP: 110/57 (30 Jan 2023 05:05) (105/65 - 110/57)  BP(mean): --  RR: 18 (30 Jan 2023 05:05) (18 - 20)  SpO2: 92% (30 Jan 2023 05:05) (85% - 97%)    I&O's Summary      PHYSICAL EXAM:  GENERAL: NAD, well-developed, comfortable on nasal canula  HEAD:  Atraumatic, Normocephalic  EYES: EOMI, PERRLA, conjunctiva and sclera clear  NECK: Supple, No JVD  CHEST/LUNG: mild decrease breath sounds bilaterally; No wheeze   HEART: Regular rate and rhythm; No murmurs, rubs, or gallops  ABDOMEN: Soft, Nontender, Nondistended; Bowel sounds present  Neuro: AAOx3, no focal weakness, 5/5 b/l extremity strength  EXTREMITIES:  2+ Peripheral Pulses, No clubbing, cyanosis, trace b/l edema    LABS:            CAPILLARY BLOOD GLUCOSE                RADIOLOGY & ADDITIONAL TESTS:    Imaging Personally Reviewed:  [x] YES  [ ] NO    Will obtain old records:  [ ] YES  [x] NO

## 2023-01-30 NOTE — PROGRESS NOTE ADULT - SUBJECTIVE AND OBJECTIVE BOX
Date of Service: 01-30-23 @ 12:30    Patient is a 82y old  Female who presents with a chief complaint of weakness (30 Jan 2023 12:15)      Any change in ROS: seems OK:  no sob:  no cough       MEDICATIONS  (STANDING):  budesonide 160 MICROgram(s)/formoterol 4.5 MICROgram(s) Inhaler 2 Puff(s) Inhalation two times a day  cefepime   IVPB 2000 milliGRAM(s) IV Intermittent every 12 hours  chlorhexidine 2% Cloths 1 Application(s) Topical daily  cholecalciferol 2000 Unit(s) Oral daily  citalopram 10 milliGRAM(s) Oral daily  fluticasone propionate 50 MICROgram(s)/spray Nasal Spray 1 Spray(s) Both Nostrils two times a day  folic acid 1 milliGRAM(s) Oral daily  guaiFENesin  milliGRAM(s) Oral every 12 hours  influenza  Vaccine (HIGH DOSE) 0.7 milliLiter(s) IntraMuscular once  ipratropium    for Nebulization 500 MICROGram(s) Nebulizer every 6 hours  metoprolol tartrate 25 milliGRAM(s) Oral two times a day  mirtazapine 7.5 milliGRAM(s) Oral daily  pantoprazole    Tablet 40 milliGRAM(s) Oral before breakfast  predniSONE   Tablet 20 milliGRAM(s) Oral daily  senna 1 Tablet(s) Oral daily  sodium bicarbonate 650 milliGRAM(s) Oral three times a day  trimethoprim  160 mG/sulfamethoxazole 800 mG 1 Tablet(s) Oral daily    MEDICATIONS  (PRN):  acetaminophen     Tablet .. 650 milliGRAM(s) Oral every 6 hours PRN Temp greater or equal to 38C (100.4F), Mild Pain (1 - 3)  aluminum hydroxide/magnesium hydroxide/simethicone Suspension 30 milliLiter(s) Oral every 4 hours PRN Dyspepsia  melatonin 3 milliGRAM(s) Oral at bedtime PRN Insomnia  ondansetron Injectable 4 milliGRAM(s) IV Push every 8 hours PRN Nausea and/or Vomiting  polyethylene glycol 3350 17 Gram(s) Oral daily PRN Constipation    Vital Signs Last 24 Hrs  T(C): 36.7 (30 Jan 2023 06:03), Max: 38.3 (30 Jan 2023 05:05)  T(F): 98 (30 Jan 2023 06:03), Max: 100.9 (30 Jan 2023 05:05)  HR: 85 (30 Jan 2023 05:05) (81 - 94)  BP: 110/57 (30 Jan 2023 05:05) (105/65 - 110/57)  BP(mean): --  RR: 18 (30 Jan 2023 05:05) (18 - 20)  SpO2: 92% (30 Jan 2023 05:05) (85% - 97%)    Parameters below as of 30 Jan 2023 05:05  Patient On (Oxygen Delivery Method): nasal cannula  O2 Flow (L/min): 1.5      I&O's Summary        Physical Exam:   GENERAL: NAD, well-groomed, well-developed  HEENT: RANJIT/   Atraumatic, Normocephalic  ENMT: No tonsillar erythema, exudates, or enlargement; Moist mucous membranes, Good dentition, No lesions  NECK: Supple, No JVD, Normal thyroid  CHEST/LUNG: Clear to auscultaion  CVS: Regular rate and rhythm; No murmurs, rubs, or gallops  GI: : Soft, Nontender, Nondistended; Bowel sounds present  NERVOUS SYSTEM:  Alert & Oriented X3  EXTREMITIES: -edema  LYMPH: No lymphadenopathy noted  SKIN: No rashes or lesions  ENDOCRINOLOGY: No Thyromegaly  PSYCH: Appropriate    Labs:                              7.1    73.31 )-----------( 26       ( 30 Jan 2023 12:20 )             21.9           CAPILLARY BLOOD GLUCOSE            rad< from: CT Abdomen and Pelvis w/ IV Cont (01.18.23 @ 17:19) >  BOWEL: Moderate hiatal hernia. No bowel obstruction. Colonic   diverticulosis without acute diverticulitis. Stool is noted within the   large bowel.  PERITONEUM: No ascites.  RETROPERITONEUM/LYMPH NODES: No lymphadenopathy.  ABDOMINAL WALL: Within normal limits.  BONES: Multilevel degenerative changes of the spine and chronic appearing   compression fractures, most severe at the T12 vertebral body level.    IMPRESSION:  No pulmonary arterial embolism.    Status post wedge resections in both upper lobes. 2 cm nodular opacity   associated with the staple line in the right upper lobe is noted.   Etiology is unclear.    Multiple ill-defined opacities are noted within both lungs, more so in   the right lower lobe. Exact etiology is unclear.    Bilateral pleural effusions, right more than left.      --- End of Report ---           NIKA HANDY MD; Resident Radiologist  This document has been electronically signed.  SUSAN HEREDIA MD; Attending Radiologist  This document has been electronically signed. Jan 18 2023  6:09PM    < end of copied text >            RECENT CULTURES:        RESPIRATORY CULTURES:          Studies  Chest X-RAY  CT SCAN Chest   Venous Dopplers: LE:   CT Abdomen  Others

## 2023-01-30 NOTE — CONSULT NOTE ADULT - PROBLEM SELECTOR RECOMMENDATION 9
Detail Level: Detailed she says her major symptoms were that she was not feeling right: she had no significant sob and no sore throat or fever: she has been afebrile here: cta with no pe: also shows: New patchy ground glass opacities since 8/6/2022, likely infectious/inflammatory.  Increased mediastinal lymphadenopathy. Unchanged left upper lobe nodule since the prior study and decreased  since more remote studies.:    start antibiotics: check legionella and strept ag as well ss mrsa:  start ceftriaxone and azithromycin : check all cultures:  she has pND and some sinus issues: start flonase: :  she also has increasing LN ? ebus : ? recurrence: will need pet scan as an outpt

## 2023-01-30 NOTE — PROGRESS NOTE ADULT - SUBJECTIVE AND OBJECTIVE BOX
Hematology Oncology Follow-up    INTERVAL HPI/OVERNIGHT EVENTS:  No o/n events, patient resting comfortably.    VITAL SIGNS:  T(F): 98 (01-30-23 @ 06:03)  HR: 85 (01-30-23 @ 05:05)  BP: 110/57 (01-30-23 @ 05:05)  RR: 18 (01-30-23 @ 05:05)  SpO2: 92% (01-30-23 @ 05:05)  Wt(kg): --      PHYSICAL EXAM:    Constitutional: AAOx3, NAD  Eyes: PERRL, EOMI, sclera non-icteric  Neck: supple, no masses, no JVD, no lymphadenopathy  Respiratory: CTA b/l, no wheezing, rhonchi, rales, with normal respiratory effort  Cardiovascular: RRR, normal S1S2, no M/R/G  Gastrointestinal: soft, NTND, no masses palpable, BS normal in all four quadrants, no HSM  Extremities:  no edema  MSK: no obvious abnormalities, normal ROM, no lymphadenopathy  Neurological: Grossly intact  Skin: Normal temperature, no rash, no echymoses, no petichiae  Psych: normal affect    MEDICATIONS  (STANDING):  budesonide 160 MICROgram(s)/formoterol 4.5 MICROgram(s) Inhaler 2 Puff(s) Inhalation two times a day  cefepime   IVPB 2000 milliGRAM(s) IV Intermittent every 12 hours  chlorhexidine 2% Cloths 1 Application(s) Topical daily  cholecalciferol 2000 Unit(s) Oral daily  citalopram 10 milliGRAM(s) Oral daily  fluticasone propionate 50 MICROgram(s)/spray Nasal Spray 1 Spray(s) Both Nostrils two times a day  folic acid 1 milliGRAM(s) Oral daily  guaiFENesin  milliGRAM(s) Oral every 12 hours  influenza  Vaccine (HIGH DOSE) 0.7 milliLiter(s) IntraMuscular once  ipratropium    for Nebulization 500 MICROGram(s) Nebulizer every 6 hours  metoprolol tartrate 25 milliGRAM(s) Oral two times a day  mirtazapine 7.5 milliGRAM(s) Oral daily  pantoprazole    Tablet 40 milliGRAM(s) Oral before breakfast  predniSONE   Tablet 20 milliGRAM(s) Oral daily  senna 1 Tablet(s) Oral daily  sodium bicarbonate 650 milliGRAM(s) Oral three times a day  trimethoprim  160 mG/sulfamethoxazole 800 mG 1 Tablet(s) Oral daily    MEDICATIONS  (PRN):  acetaminophen     Tablet .. 650 milliGRAM(s) Oral every 6 hours PRN Temp greater or equal to 38C (100.4F), Mild Pain (1 - 3)  aluminum hydroxide/magnesium hydroxide/simethicone Suspension 30 milliLiter(s) Oral every 4 hours PRN Dyspepsia  melatonin 3 milliGRAM(s) Oral at bedtime PRN Insomnia  ondansetron Injectable 4 milliGRAM(s) IV Push every 8 hours PRN Nausea and/or Vomiting  polyethylene glycol 3350 17 Gram(s) Oral daily PRN Constipation      codeine (Nausea)  doxycycline (Nausea)  penicillin (Hives)      LABS:                           RADIOLOGY & ADDITIONAL TESTS:  Studies reviewed.

## 2023-01-30 NOTE — PROGRESS NOTE ADULT - SUBJECTIVE AND OBJECTIVE BOX
[facility]ES  ANDREW Rodríguez Y. Patel, S. Shah, G. Bebeto  [phone]  ([phone] - weekdays after 5pm and weekends)    Name: BETTIE NGUYEN  Age/Gender: 82y Female  MRN: [id number]    Interval History:  Patient seen and examined this morning.  Fever to 100.9F overnight, initially states she had chills then denies  States fever resolved now and that she has no new symptoms  Denies chest pain, dyspnea, change in cough, abd pain, n/v/d, dysuria.   Notes reviewed.     Allergies: codeine (Nausea)  doxycycline (Nausea)  penicillin (Hives)    Objective:  Vitals:   T(F): 98 (01-30-23 @ 06:03), Max: 100.9 (01-30-23 @ 05:05)  HR: 85 (01-30-23 @ 05:05) (81 - 94)  BP: 110/57 (01-30-23 @ 05:05) (105/65 - 110/57)  RR: 18 (01-30-23 @ 05:05) (18 - 20)  SpO2: 92% (01-30-23 @ 05:05) (85% - 97%)  Physical Examination:  General: no acute distress, nontoxic appearing  HEENT: NC/AT, EOMI, anicteric, neck supple  Cardio: S1, S2 present, normal rate  Resp: clear to auscultation bilaterally  Abd: soft, NT, ND, + BS  Neuro: AAOx3, no obvious focal deficits  Ext: no edema, no cyanosis, moving extremities  Skin: warm, dry, no visible rash  Psych: appropriate affect and mood for situation  Lines: L chest port accessed, no erythema/TTP    Laboratory Studies:  CBC:   WBC Trend:  48.45 01-25-23 @ 07:18  49.81 01-24-23 @ 11:29    CMP:   Creatinine, Serum: 0.69 mg/dL (01-25-23 @ 07:18)    Microbiology: reviewed   Respiratory Viral Panel with COVID-19 by DIANA (01.30.23 @ 07:31)    Rapid RVP Result: NotDetec    SARS-CoV-2: NotDetec: This Respiratory Panel uses polymerase chain reaction (PCR) to detect for  adenovirus; coronavirus (HKU1, NL63, 229E, OC43); human metapneumovirus  (hMPV); human enterovirus/rhinovirus (Entero/RV); influenza A; influenza  A/H1; influenza A/H3; influenza A/H1-2009; influenza B; parainfluenza  viruses 1, 2, 3, 4; respiratory syncytial virus; Mycoplasma pneumoniae;  Chlamydophila pneumoniae; and SARS-CoV-2.    Adenovirus (RapRVP): NotDetec    Influenza A (RapRVP): NotDetec    Influenza AH1 2009 (RapRVP): NotDetec    Influenza AH1 (RapRVP): NotDetec    Influenza AH3 (RapRVP): NotDetec    Influenza B (RapRVP): NotDetec    Parainfluenza 1 (RapRVP): NotDetec    Parainfluenza 2 (RapRVP): NotDetec    Parainfluenza 3 (RapRVP): NotDetec    Parainfluenza 4 (RapRVP): NotDetec    Resp Syncytial Virus (RapRVP): NotDetec    Chlamydia pneumoniae (RapRVP): NotDetec    Mycoplasma pneumoniae (RapRVP): NotDetec    Entero/Rhinovirus (RapRVP): NotDetec    hMPV (RapRVP): NotDetec    Coronavirus (229E,HKU1,NL63,OC43): NotDetec    Culture - Blood (collected 01-18-23 @ 15:50)  Source: .Blood Blood-Peripheral  Final Report (01-23-23 @ 23:01):    No Growth Final    Culture - Blood (collected 01-18-23 @ 15:30)  Source: .Blood Blood-Peripheral  Final Report (01-23-23 @ 23:01):    No Growth Final    Radiology: reviewed     Medications:  acetaminophen     Tablet .. 650 milliGRAM(s) Oral every 6 hours PRN  aluminum hydroxide/magnesium hydroxide/simethicone Suspension 30 milliLiter(s) Oral every 4 hours PRN  budesonide 160 MICROgram(s)/formoterol 4.5 MICROgram(s) Inhaler 2 Puff(s) Inhalation two times a day  cefepime   IVPB 2000 milliGRAM(s) IV Intermittent every 12 hours  chlorhexidine 2% Cloths 1 Application(s) Topical daily  cholecalciferol 2000 Unit(s) Oral daily  citalopram 10 milliGRAM(s) Oral daily  fluticasone propionate 50 MICROgram(s)/spray Nasal Spray 1 Spray(s) Both Nostrils two times a day  folic acid 1 milliGRAM(s) Oral daily  guaiFENesin  milliGRAM(s) Oral every 12 hours  influenza  Vaccine (HIGH DOSE) 0.7 milliLiter(s) IntraMuscular once  ipratropium    for Nebulization 500 MICROGram(s) Nebulizer every 6 hours  melatonin 3 milliGRAM(s) Oral at bedtime PRN  metoprolol tartrate 25 milliGRAM(s) Oral two times a day  mirtazapine 7.5 milliGRAM(s) Oral daily  ondansetron Injectable 4 milliGRAM(s) IV Push every 8 hours PRN  pantoprazole    Tablet 40 milliGRAM(s) Oral before breakfast  polyethylene glycol 3350 17 Gram(s) Oral daily PRN  predniSONE   Tablet 20 milliGRAM(s) Oral daily  senna 1 Tablet(s) Oral daily  sodium bicarbonate 650 milliGRAM(s) Oral three times a day  trimethoprim  160 mG/sulfamethoxazole 800 mG 1 Tablet(s) Oral daily    Current Antimicrobials:  cefepime   IVPB 2000 milliGRAM(s) IV Intermittent every 12 hours  trimethoprim  160 mG/sulfamethoxazole 800 mG 1 Tablet(s) Oral daily    Prior/Completed Antimicrobials:  cefepime   IVPB

## 2023-01-31 NOTE — PROGRESS NOTE ADULT - ASSESSMENT
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 She presented here from [facility] for elevated WBC and low hemoglobin, severe weakness. Pt states for the past 2-3 days has been having increased weakness and lethargy, decreased appetite. +sputum productive cough. + cui, no sob, no difficulty breathing. No abdominal pain, nausea, vomiting, no bloody stools. Has endorsed multiple episodes of loose stools x weeks, currently being treated for c.diff with oral vancomycin.       Fatigue/dyspnea: likely due to severe anemia :  pt s/p 1 unit PRB  Diarrhea  Pulmonary vasculitis  :  hx of Tachycardia :  Recent TTE on 11/3/22 reviewed: normal LV. outpt card Dr. Ady Cooper  Anxiety and depression  GERD (gastroesophageal reflux disease)  Hyperlipidemia.   DVT ppx     1/20:  Fatigue/dyspnea: likely due to severe anemia :  pt s/p 1 unit PRBC: hb now  > 10  : she is on 2 L of oxygen : no wheezing: no overt signs of bleeding : ct chest is abnormal report noted:  has jean-claude ill defined opacities of unclear etiology  : venous ph is mild acidotic:  no need for Bipap at this time : monitor and trend hb  Diarrhea : symptomatic rx:  workup per primary team  Pulmonary vasculitis  : per rheumatology  : had recent vats surgery : LN biopsy noted:   hx of Tachycardia :  Recent TTE on 11/3/22 reviewed: normal LV. outpt card Dr. Ady Cooper  Anxiety and depression  GERD (gastroesophageal reflux disease) : ppi   Hyperlipidemia.   recent covid  : o n last admission she was treated for covid infection:   DVT ppx   d wteam    1/22:    Fatigue/dyspnea: likely due to severe anemia :  pt s/p 1 unit PRBC: hb now  > 10  : she is on 2 L of oxygen : no wheezing: no overt signs of bleeding : ct chest is abnormal report noted:  has jean-claude ill defined opacities of unclear etiology  : venous ph is mild acidotic:  no need for Bipap at this time : monitor and trend hb: she remained stable o gracie last 1 days:  she is not on any antibtiocs at this time: RVP  and blood cultures are negative: she had ebus biopsy also before: reviewed: ID following  Diarrhea : symptomatic rx:  workup per primary team : seems to have resolved  Pulmonary vasculitis  : per rheumatology  : had recent vats surgery : LN biopsy noted:   Bicytopneia and leucocytosis: ? etiology  : for possible bone marrow biopsy on Monday    hx of Tachycardia :  Recent TTE on 11/3/22 reviewed: normal LV. outpt card Dr. Ady Cooper  Anxiety and depression  GERD (gastroesophageal reflux disease) : ppi   Hyperlipidemia.   recent covid  : on last admission she was treated for covid infection:   DVT ppx   dw team    1/23:    Fatigue/dyspnea: likely due to severe anemia : feels weak  but no bleeding noted:  on 2 L of oxygen : she needs PT OT   Diarrhea :resolved:   Pulmonary vasculitis  : per rheumatology  : had recent vats surgery : LN biopsy noted: : she never received teat ment for vasculitis except low dose steroids:  she is awaiting bone marrow biopsy prior to induction therapy with rituximab: The ct chest done on this admission does not show pneumothorax and has jean-claude effusions:  There are some new opacites in rigth lower lobe which were not therre:  clinically she does not seem to have pneumonia: ID following: could it be a prt of vasculitis  Bicytopneia and leucocytosis: ? etiology  : for possible bone marrow biopsy today  hx of Tachycardia :  Recent TTE on 11/3/22 reviewed: normal LV. outpt card Dr. Ady Cooper  Anxiety and depression  GERD (gastroesophageal reflux disease) : ppi   Hyperlipidemia.   recent covid  : on last admission she was treated for covid infection:   DVT ppx   dw team    1/24:    Fatigue/dyspnea: likely due to severe anemia : feels weak  but no bleeding noted:  on 2 L of oxygen : she needs PT OT   Diarrhea :resolved:   Pulmonary vasculitis  : per rheumatology  : had recent vats surgery : LN biopsy noted: : she never received teat ment for vasculitis except low dose steroids:  she is awaiting bone marrow biopsy prior to induction therapy with rituximab: The ct chest done on this admission does not show pneumothorax and has jean-claude effusions:  There are some new opacites in rigth lower lobe which were not therre:  clinically she does not seem to have pneumonia: ID following: could it be a prt of vasculitis  Bicytopneia and leucocytosis: BM biopsy done: await results for eventual immunosuppressives therapy:   hx of Tachycardia :  Recent TTE on 11/3/22 reviewed: normal LV. outpt card Dr. Ady Cooper  Anxiety and depression  GERD (gastroesophageal reflux disease) : ppi   Hyperlipidemia.   recent covid  : on last admission she was treated for covid infection:   DVT ppx   dw team    1/25:    Fatigue/dyspnea: likely due to severe anemia : feels weak  but no bleeding noted:  on 2 L of oxygen : she needs PT OT   Diarrhea :resolved:   Pulmonary vasculitis  : per rheumatology  : had recent vats surgery : LN biopsy noted: : she never received teat ment for vasculitis except low dose steroids:  she is awaiting bone marrow biopsy prior to induction therapy with rituximab: The ct chest done on this admission does not show pneumothorax and has jean-claude effusions:  There are some new opacites in rigth lower lobe which were not there:  clinically she does not seem to have pneumonia: ID following: could it be a part of vasculitis : her resp status has not changed   Bicytopneia and leucocytosis: BM biopsy done: await results for still pending   hx of Tachycardia :  Recent TTE on 11/3/22 reviewed: normal LV. outpt card Dr. Ady Cooper  Anxiety and depression  GERD (gastroesophageal reflux disease) : ppi   Hyperlipidemia.   recent covid  : on last admission she was treated for covid infection:   DVT ppx   dw team: awaiting decision on immunosuppression     1/26;      Fatigue/dyspnea: likely due to severe anemia : feels weak  but no bleeding noted:  on 2 L of oxygen : she needs PT OT : got it yesterday    Diarrhea :resolved:   Pulmonary vasculitis  : per rheumatology  : had recent vats surgery : LN biopsy noted: : she never received teat ment for vasculitis except low dose steroids:  she is awaiting bone marrow biopsy prior to induction therapy with rituximab: The ct chest done on this admission does not show pneumothorax and has jean-claude effusions:  There are some new opacities in rigth lower lobe which were not there:  clinically she does not seem to have pneumonia: ID following: could it be a part of vasculitis : her resp status has not changed  : being observed off antibiotics   Bicytopneia and leucocytosis: BM biopsy done: await results for still pending   hx of Tachycardia :  Recent TTE on 11/3/22 reviewed: normal LV. outpt card Dr. Ady Cooper  Anxiety and depression  GERD (gastroesophageal reflux disease) : ppi   Hyperlipidemia.   recent covid  : on last admission she was treated for covid infection:   DVT ppx   dw team: awaiting decision on immunosuppression     1/27:    Fatigue/dyspnea: likely due to severe anemia : feels weak  but no bleeding noted:  on 2 L of oxygen : cont  PT OT :   Diarrhea :resolved:   Pulmonary vasculitis  : per rheumatology  : had recent vats surgery : LN biopsy noted: : she never received teat ment for vasculitis except low dose steroids:  she is awaiting bone marrow biopsy prior to induction therapy with rituximab: The ct chest done on this admission does not show pneumothorax and has jean-claude effusions:  There are some new opacities in rigth lower lobe which were not there:  clinically she does not seem to have pneumonia: ID following: could it be a part of vasculitis : her resp status has not changed  : being observed off antibiotics :  on bactrim prophylaxis as she is on chr steroids   Bicytopneia and leucocytosis: BM biopsy done: await results for still pending   hx of Tachycardia :  Recent TTE on 11/3/22 reviewed: normal LV. outpt card Dr. Ady Cooper  Anxiety and depression  GERD (gastroesophageal reflux disease) : ppi   Hyperlipidemia.   recent covid  : on last admission she was treated for covid infection:   DVT ppx   dw team: awaiting decision on immunosuppression     1/29:    Fatigue/dyspnea: likely due to severe anemia : feels weak  but no bleeding noted:  on 2 L of oxygen : cont  PT OT :   Diarrhea :resolved:   Pulmonary vasculitis  : per rheumatology  : had recent vats surgery : LN biopsy noted: : she never received treat ment for vasculitis except low dose steroids:  she is awaiting bone marrow biopsy prior to induction therapy with rituximab: The ct chest done on this admission does not show pneumothorax and has jean-claude effusions:  There are some new opacities in rigth lower lobe which were not there:  clinically she does not seem to have pneumonia: ID following: could it be a part of vasculitis : her resp status has not changed  : being observed off antibiotics :  on bactrim prophylaxis as she is on chr steroids   Bicytopneia and leucocytosis: BM biopsy: results reviewed defer to [facility]  hx of Tachycardia :  Recent TTE on 11/3/22 reviewed: normal LV.   Anxiety and depression  GERD (gastroesophageal reflux disease) : ppi   Hyperlipidemia.   recent covid  : on last admission she was treated for covid infection:   DVT ppx   dw team: awaiting decision on immunosuppression     1/30:    Fatigue/dyspnea: likely due to severe anemia : feels weak  but no bleeding noted:  on 2 L of oxygen : cont  PT OT : sitting in chair today   Diarrhea :resolved:   Pulmonary vasculitis  : per rheumatology  : had recent vats surgery : LN biopsy noted: : she never received treat ment for vasculitis except low dose steroids:  she is awaiting bone marrow biopsy prior to induction therapy with rituximab: The ct chest done on this admission does not show pneumothorax and has jean-claude effusions:  There are some new opacities in rigth lower lobe which were not there:  clinically she does not seem to have pneumonia: ID following: could it be a part of vasculitis : her resp status has not changed  : being observed off antibiotics :  on bactrim prophylaxis as she is on chr steroids  /l however she had low grade tempt today : rvp is negative : cultures sent: ID follow up   Bicytopneia and leucocytosis: BM biopsy: results reviewed defer to [facility]  hx of Tachycardia :  Recent TTE on 11/3/22 reviewed: normal LV.   Anxiety and depression  GERD (gastroesophageal reflux disease) : ppi   Hyperlipidemia.   recent covid  : on last admission she was treated for covid infection:   DVT ppx   dw team: awaiting decision on immunosuppression     1/31:    Fatigue/dyspnea: likely due to severe anemia : feels weak  but no bleeding noted:  on 2 L of oxygen : cont  PT OT : lying in bed:   Diarrhea :resolved:   Pulmonary vasculitis  : per rheumatology  : had recent vats surgery : LN biopsy noted: : she never received treat ment for vasculitis except low dose steroids:  she is awaiting bone marrow biopsy prior to induction therapy with rituximab: The ct chest done on this admission does not show pneumothorax and has jean-claude effusions:  There are some new opacities in rigth lower lobe which were not there:  clinically she does not seem to have pneumonia: ID following: could it be a part of vasculitis : her resp status has not changed  : being observed off antibiotics :  on bactrim prophylaxis as she is on chr steroids : she seems OK:  no sob:     Bicytopneia and leucocytosis: BM biopsy: results reviewed defer to [facility] ; for eventual chemo   hx of Tachycardia :  Recent TTE on 11/3/22 reviewed: normal LV.   Anxiety and depression  GERD (gastroesophageal reflux disease) : ppi   Hyperlipidemia.   recent covid  : on last admission she was treated for covid infection:   DVT ppx   dw team: awaiting decision on immunosuppression

## 2023-01-31 NOTE — PROGRESS NOTE ADULT - ASSESSMENT
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 [facility] WBC and low hemoglobin, severe weakness. Pt states for the past 2-3 days has been having increased weakness and lethargy, decreased appetite. +sputum productive cough. + cui, no sob, no difficulty breathing. No abdominal pain, nausea, vomiting, no bloody stools. Has endorsed multiple episodes of loose stools x weeks, currently being treated for c.diff with oral vancomycin.       Fatigue/dyspnea: due to severe anemia  - 2' MDS  - transfuse to keep Hgb above 7.0  - no melena, no hematochezia. no BRBPR. occult stool neg.   - she tends to require IV Lasix intermittently post transfusion.  - doubt GI bleed   - Physical therapy. Out of bed to chair with assistance.     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  - s/p Vanco PO a few days (started in rehab)  - diarrhea completely resolved.   - monitor off PO vanco per ID  - now constipated. PRN bowel regimen started. +BM    Fever, not neutropenic, starting 1/30/23 @ 5:05 AM  - RVP 1/30/23 (-)  - monitor blood cxs 1/30/23  - started empirically on cefepime 1/30/23, switched to merrem 1/31/23    Pulmonary vasculitis   - Recent TTE on 11/3/22 reviewed: normal LV. outpt card Dr. Ady Cooper  - outpt pulm Mack Tucker   - s/p thoracoscopic biopsy of mediastinal lymph node and Lung wedge resection 11/10/22  - recent pleural effusion s/p 650 cc U/S-guided rt thoracentesis 11/17/22  - exudative but no neutrophilic predominance and initial Gram stain w/o organisms  - cultures neg.   - path results favoring vasculitis: no evidence for lymphoma. Cytology also came back negative.   - comfortable on nasal canula, better post diuretic last admission.   - rheum and heme-onc following previously, will reconsult.   - last admission, she was not started on immunosuppressants such as cellcept given recent treatment for COVID19 at that time  - c/w prednisone 20 mg qdaily.   - RTX under consideration --> acute hepatitis panel (-) and quantiferon indeterminate  - ID started PCP ppx with Bactrim.     hx of Tachycardia    - cont low-dose metoprolol, 50 mg to 25 mg dose reduced in rehab.   - card consult (Dr. Hooker) in the past, if needed    Anxiety and depression  - stable, continue citalopram and Remeron.  - Pt denied SI/HI ideations, denied visual and auditory hallucinations.     GERD (gastroesophageal reflux disease)  - continue PPI    Hyperlipidemia.   - continue home ezetimibe. okay to hold if nonformulary     DVT ppx   - holding AC in the setting of anemia, pancytopenia.   - sachin noel LEs

## 2023-01-31 NOTE — PROVIDER CONTACT NOTE (CHANGE IN STATUS NOTIFICATION) - ACTION/TREATMENT ORDERED:
Provider made aware. Temperature rechecked resulting as 98.6. No further interventions at this time.
Provider made aware. Will recheck temperature, see vital signs documentation. No further interventions at this time.

## 2023-01-31 NOTE — PROVIDER CONTACT NOTE (CHANGE IN STATUS NOTIFICATION) - RECOMMENDATIONS
Pt given PRN tylenol for temperature. Given cool packs and applied on extremities. Notify provider.
Pt given cool packs and PRN tylenol for fever. Notify provider.

## 2023-01-31 NOTE — PROVIDER CONTACT NOTE (CHANGE IN STATUS NOTIFICATION) - BACKGROUND
81yo female, AOx4 came in for dyspnea likely due to anemia. PMHx: HLD, GERD, and anxiety.
83yo female AOx4 with diagnosis of anemia. PMHx: HLD, GERD, lung mass and anxiety.

## 2023-01-31 NOTE — PROGRESS NOTE ADULT - SUBJECTIVE AND OBJECTIVE BOX
[facility] DIV[facility] INFECTIOUS DISEASES  ANDREW Rodríguez Y. Patel, S. Shah, G. Bebeto  [phone]  ([phone] - weekdays after 5pm and weekends)    Name: BETTIE NGUYEN  Age/Gender: 82y Female  MRN: [id number]    Interval History:  Patient seen and examined this morning.   Patient awake, oriented x1, slow to answer.  Denies chest or abd pain, limited ROS.   Notes reviewed. Febrile last night to 101.4F.      Allergies: codeine (Nausea)  doxycycline (Nausea)  penicillin (Hives)    Objective:  Vitals:   T(F): 97.9 (01-31-23 @ 04:10), Max: 101.4 (01-30-23 @ 21:47)  HR: 98 (01-31-23 @ 04:10) (81 - 107)  BP: 116/70 (01-31-23 @ 04:10) (109/65 - 117/68)  RR: 18 (01-31-23 @ 04:10) (18 - 18)  SpO2: 96% (01-31-23 @ 04:10) (91% - 97%)  Physical Examination:  General: no acute distress, NC  HEENT: NC/AT, EOMI, anicteric, neck supple  Cardio: S1, S2 present, normal rate  Resp: decreased breath sounds b/l  Abd: soft, nontender, nondistended, + BS  Neuro: AAOx1, slow to answer/following   Ext: no edema, no cyanosis, moves extremities  Skin: warm, dry, no visible rash  Lines: PIV, L chest port without erythema/TTP    Laboratory Studies:  CBC:                       6.8    74.20 )-----------( 26       ( 31 Jan 2023 07:05 )             22.1     WBC Trend:  74.20 01-31-23 @ 07:05  73.31 01-30-23 @ 12:20  48.45 01-25-23 @ 07:18  49.81 01-24-23 @ 11:29    CMP: 01-31    134<L>  |  99  |  29<H>  ----------------------------<  48<LL>  4.9   |  19<L>  |  1.20    Ca    9.1      31 Jan 2023 07:05    TPro  6.3  /  Alb  3.5  /  TBili  0.6  /  DBili  x   /  AST  27  /  ALT  5<L>  /  AlkPhos  57  01-31    Creatinine, Serum: 1.20 mg/dL (01-31-23 @ 07:05)  Creatinine, Serum: 1.05 mg/dL (01-30-23 @ 12:20)  Creatinine, Serum: 0.69 mg/dL (01-25-23 @ 07:18)    LIVER FUNCTIONS - ( 31 Jan 2023 07:05 )  Alb: 3.5 g/dL / Pro: 6.3 g/dL / ALK PHOS: 57 U/L / ALT: 5 U/L / AST: 27 U/L / GGT: x           Microbiology: reviewed   Respiratory Viral Panel with COVID-19 by DIANA (01.30.23 @ 07:31)    Rapid RVP Result: NotDete    SARS-CoV-2: NotDetec: This Respiratory Panel uses polymerase chain reaction (PCR) to detect for  adenovirus; coronavirus (HKU1, NL63, 229E, OC43); human metapneumovirus  (hMPV); human enterovirus/rhinovirus (Entero/RV); influenza A; influenza  A/H1; influenza A/H3; influenza A/H1-2009; influenza B; parainfluenza  viruses 1, 2, 3, 4; respiratory syncytial virus; Mycoplasma pneumoniae;  Chlamydophila pneumoniae; and SARS-CoV-2.    Adenovirus (RapRVP): NotDetec    Influenza A (RapRVP): NotDetec    Influenza AH1 2009 (RapRVP): NotDetec    Influenza AH1 (RapRVP): NotDetec    Influenza AH3 (RapRVP): NotDetec    Influenza B (RapRVP): NotDetec    Parainfluenza 1 (RapRVP): NotDetec    Parainfluenza 2 (RapRVP): NotDetec    Parainfluenza 3 (RapRVP): NotDetec    Parainfluenza 4 (RapRVP): NotDetec    Resp Syncytial Virus (RapRVP): NotDetec    Chlamydia pneumoniae (RapRVP): NotDetec    Mycoplasma pneumoniae (RapRVP): NotDetec    Entero/Rhinovirus (RapRVP): [facility]    hMPV (RapRVP): [facility]    Coronavirus (229E,HKU1,NL63,OC43): [facility]    Culture - Blood (collected 01-18-23 @ 15:50)  Source: .Blood Blood-Peripheral  Final Report (01-23-23 @ 23:01):    No Growth Final    Culture - Blood (collected 01-18-23 @ 15:30)  Source: .Blood Blood-Peripheral  Final Report (01-23-23 @ 23:01):    No Growth Final    Radiology: imaging and reprots reviewed   Xray Chest 1 View- PORTABLE-Urgent (Xray Chest 1 View- PORTABLE-Urgent .) (01.30.23 @ 15:38) > IMPRESSION: Unchanged nodular opacities. Increased hazy opacities may represent atelectasis or layering effusion    Xray Chest 1 View- PORTABLE-Urgent (Xray Chest 1 View- PORTABLE-Urgent .) (01.30.23 @ 05:38) >IMPRESSION: No new focal consolidations. Nodular opacities unchanged. Hazy lower lung predominant opacities, better seen on recent CT, which may represent effusions with atelectasis are still visualized on this chest x-ray.    Medications:  acetaminophen     Tablet .. 650 milliGRAM(s) Oral every 6 hours PRN  aluminum hydroxide/magnesium hydroxide/simethicone Suspension 30 milliLiter(s) Oral every 4 hours PRN  budesonide 160 MICROgram(s)/formoterol 4.5 MICROgram(s) Inhaler 2 Puff(s) Inhalation two times a day  chlorhexidine 2% Cloths 1 Application(s) Topical daily  cholecalciferol 2000 Unit(s) Oral daily  citalopram 10 milliGRAM(s) Oral daily  fluticasone propionate 50 MICROgram(s)/spray Nasal Spray 1 Spray(s) Both Nostrils two times a day  folic acid 1 milliGRAM(s) Oral daily  guaiFENesin  milliGRAM(s) Oral every 12 hours  influenza  Vaccine (HIGH DOSE) 0.7 milliLiter(s) IntraMuscular once  ipratropium    for Nebulization 500 MICROGram(s) Nebulizer every 6 hours  melatonin 3 milliGRAM(s) Oral at bedtime PRN  meropenem  IVPB 1000 milliGRAM(s) IV Intermittent every 12 hours  metoprolol tartrate 25 milliGRAM(s) Oral two times a day  mirtazapine 7.5 milliGRAM(s) Oral daily  ondansetron Injectable 4 milliGRAM(s) IV Push every 8 hours PRN  pantoprazole    Tablet 40 milliGRAM(s) Oral before breakfast  polyethylene glycol 3350 17 Gram(s) Oral daily PRN  predniSONE   Tablet 20 milliGRAM(s) Oral daily  senna 1 Tablet(s) Oral daily  sodium bicarbonate 650 milliGRAM(s) Oral three times a day  trimethoprim  160 mG/sulfamethoxazole 800 mG 1 Tablet(s) Oral daily    Current Antimicrobials:  meropenem  IVPB 1000 milliGRAM(s) IV Intermittent every 12 hours  trimethoprim  160 mG/sulfamethoxazole 800 mG 1 Tablet(s) Oral daily    Prior/Completed Antimicrobials:  cefepime   IVPB

## 2023-01-31 NOTE — PROVIDER CONTACT NOTE (MEDICATION) - ACTION/TREATMENT ORDERED:
Provider made aware. Ok to administer ABX cefepime. Provider made aware and ordered to administer ABX cefepime.

## 2023-01-31 NOTE — PROVIDER CONTACT NOTE (CHANGE IN STATUS NOTIFICATION) - ASSESSMENT
VS as noted, pt reported being cold.
Pt assessed for chills and discomfort. Denies discomfort and stress. VS as noted.

## 2023-01-31 NOTE — CHART NOTE - NSCHARTNOTEFT_GEN_A_CORE
1/30/2023; 22:30    Medicine PA Note     BETTIE NGUYEN  MRN-[id number]  Allergies    codeine (Nausea)  doxycycline (Nausea)  penicillin (Hives)    Intolerances        Notified by RN for increased oxygen demand. Pt's O2 saturation went down to 90-91% on 1.5 L NC; RN increased oxygen to 3.5 L NC where she is now satting at 96%. Pt seen at bedside resting comfortably in bed. Pt denies headache, chest pain, sob, n/v/d, weakness, dizziness.     Vital Signs Last 24 Hrs  T(C): 36.6 (01-31-23 @ 04:10), Max: 38.6 (01-30-23 @ 21:47)  T(F): 97.9 (01-31-23 @ 04:10), Max: 101.4 (01-30-23 @ 21:47)  HR: 98 (01-31-23 @ 04:10) (81 - 107)  BP: 116/70 (01-31-23 @ 04:10) (109/65 - 117/68)  BP(mean): --  RR: 18 (01-31-23 @ 04:10) (18 - 18)  SpO2: 96% (01-31-23 @ 04:10) (91% - 97%)                        7.1    73.31 )-----------( 26       ( 30 Jan 2023 12:20 )             21.9     01-30    135  |  100  |  26<H>  ----------------------------<  131<H>  4.9   |  23  |  1.05    Ca    9.2      30 Jan 2023 12:20    TPro  6.3  /  Alb  3.5  /  TBili  0.5  /  DBili  x   /  AST  22  /  ALT  6<L>  /  AlkPhos  54  01-30          PHYSICAL EXAM:  GENERAL: NAD, well-developed, A&Ox3  CHEST/LUNG: Clear to auscultation bilaterally; No wheezes, rhonchi or rales appreciated  HEART: Regular rate and rhythm; No murmurs, rubs, or gallops  EXTREMITIES:  2+ Peripheral Pulses, No clubbing, cyanosis, or edema      Assessment/Plan: HPI:  82 yr old female with a PMHx of diffuse large B cell lymphoma in remission, non-hodgkin's lymphoma (chemoport), depression, HLD, GERD, PE/DVT (4/2021 no longer on Eliquis), 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 She presented here from [facility] for elevated WBC and low hemoglobin, severe weakness. Pt states for the past 2-3 days has been having increased weakness and lethargy, decreased appetite. +sputum productive cough. + cui, no sob, no difficulty breathing. No abdominal pain, nausea, vomiting, no bloody stools. Has endorsed multiple episodes of loose stools x weeks, currently being treated for c.diff with oral vancomycin.  Bone marrow biopsy positive for MDS. Pt getting supportive transfusions for platelets <10 if afebrile, <15 if febrile, 50 if bleeding, hgb <7.     Now p/w increased oxygen requirements.     #?Anemia vs fever  > VS hemodynamically stable   > CXR done 1/30/23 showing unchanged nodular opacities   > Blood cultures drawn 1/30/23; results are pending   > O2 saturation 96% on 3.5 L NC - pt weaned down to 2 L NC, O2 saturation 96%  > No further intervention at this time  > Will continue to monitor and reassess pt overnight   > Will endorse to AM team, attending to follow    Yosef Reyes PA-C,   Department of Medicine    Medicine PA Note

## 2023-01-31 NOTE — PROGRESS NOTE ADULT - SUBJECTIVE AND OBJECTIVE BOX
Date of Service: 01-31-23 @ 13:55    Patient is a 82y old  Female who presents with a chief complaint of weakness (31 Jan 2023 10:25)      Any change in ROS: seems to be doing  k : no sob:  weak:  on 2 L of oxygen      MEDICATIONS  (STANDING):  budesonide 160 MICROgram(s)/formoterol 4.5 MICROgram(s) Inhaler 2 Puff(s) Inhalation two times a day  chlorhexidine 2% Cloths 1 Application(s) Topical daily  cholecalciferol 2000 Unit(s) Oral daily  citalopram 10 milliGRAM(s) Oral daily  fluticasone propionate 50 MICROgram(s)/spray Nasal Spray 1 Spray(s) Both Nostrils two times a day  folic acid 1 milliGRAM(s) Oral daily  guaiFENesin  milliGRAM(s) Oral every 12 hours  influenza  Vaccine (HIGH DOSE) 0.7 milliLiter(s) IntraMuscular once  ipratropium    for Nebulization 500 MICROGram(s) Nebulizer every 6 hours  meropenem  IVPB 1000 milliGRAM(s) IV Intermittent every 12 hours  metoprolol tartrate 25 milliGRAM(s) Oral two times a day  mirtazapine 7.5 milliGRAM(s) Oral daily  pantoprazole    Tablet 40 milliGRAM(s) Oral before breakfast  predniSONE   Tablet 20 milliGRAM(s) Oral daily  senna 1 Tablet(s) Oral daily  sodium bicarbonate 650 milliGRAM(s) Oral three times a day  trimethoprim  160 mG/sulfamethoxazole 800 mG 1 Tablet(s) Oral daily    MEDICATIONS  (PRN):  acetaminophen     Tablet .. 650 milliGRAM(s) Oral every 6 hours PRN Temp greater or equal to 38C (100.4F), Mild Pain (1 - 3)  aluminum hydroxide/magnesium hydroxide/simethicone Suspension 30 milliLiter(s) Oral every 4 hours PRN Dyspepsia  melatonin 3 milliGRAM(s) Oral at bedtime PRN Insomnia  ondansetron Injectable 4 milliGRAM(s) IV Push every 8 hours PRN Nausea and/or Vomiting  polyethylene glycol 3350 17 Gram(s) Oral daily PRN Constipation    Vital Signs Last 24 Hrs  T(C): 36.8 (31 Jan 2023 11:27), Max: 38.6 (30 Jan 2023 21:47)  T(F): 98.2 (31 Jan 2023 11:27), Max: 101.4 (30 Jan 2023 21:47)  HR: 98 (31 Jan 2023 11:27) (93 - 107)  BP: 105/63 (31 Jan 2023 11:27) (105/63 - 117/68)  BP(mean): --  RR: 18 (31 Jan 2023 11:27) (18 - 18)  SpO2: 91% (31 Jan 2023 11:27) (91% - 96%)    Parameters below as of 31 Jan 2023 11:27  Patient On (Oxygen Delivery Method): nasal cannula  O2 Flow (L/min): 2      I&O's Summary        Physical Exam:   GENERAL: NAD, well-groomed, well-developed  HEENT: RANJIT/   Atraumatic, Normocephalic  ENMT: No tonsillar erythema, exudates, or enlargement; Moist mucous membranes, Good dentition, No lesions  NECK: Supple, No JVD, Normal thyroid  CHEST/LUNG: Clear to auscultaion  CVS: Regular rate and rhythm; No murmurs, rubs, or gallops  GI: : Soft, Nontender, Nondistended; Bowel sounds present  NERVOUS SYSTEM:  Alert & Oriented X3  EXTREMITIES: - edema  LYMPH: No lymphadenopathy noted  SKIN: No rashes or lesions  ENDOCRINOLOGY: No Thyromegaly  PSYCH:calm     Labs:                              5.9    73.44 )-----------( 24       ( 31 Jan 2023 10:44 )             18.1                         6.8    74.20 )-----------( 26       ( 31 Jan 2023 07:05 )             22.1                         7.1    73.31 )-----------( 26       ( 30 Jan 2023 12:20 )             21.9     01-31    135  |  101  |  34<H>  ----------------------------<  126<H>  4.7   |  22  |  1.43<H>  01-31    134<L>  |  99  |  29<H>  ----------------------------<  48<LL>  4.9   |  19<L>  |  1.20  01-30    135  |  100  |  26<H>  ----------------------------<  131<H>  4.9   |  23  |  1.05    Ca    8.8      31 Jan 2023 10:44  Ca    9.1      31 Jan 2023 07:05  Ca    9.2      30 Jan 2023 12:20    TPro  6.0  /  Alb  3.4  /  TBili  0.6  /  DBili  x   /  AST  28  /  ALT  5<L>  /  AlkPhos  50  01-31  TPro  6.3  /  Alb  3.5  /  TBili  0.6  /  DBili  x   /  AST  27  /  ALT  5<L>  /  AlkPhos  57  01-31  TPro  6.3  /  Alb  3.5  /  TBili  0.5  /  DBili  x   /  AST  22  /  ALT  6<L>  /  AlkPhos  54  01-30    CAPILLARY BLOOD GLUCOSE      POCT Blood Glucose.: 138 mg/dL (31 Jan 2023 10:01)      LIVER FUNCTIONS - ( 31 Jan 2023 10:44 )  Alb: 3.4 g/dL / Pro: 6.0 g/dL / ALK PHOS: 50 U/L / ALT: 5 U/L / AST: 28 U/L / GGT: x             rad< from: Xray Chest 1 View- PORTABLE-Urgent (Xray Chest 1 View- PORTABLE-Urgent .) (01.30.23 @ 15:38) >  [facility]     PROCEDURE DATE:  01/30/2023          INTERPRETATION:  CLINICAL INDICATION: Fever    TECHNIQUE: Single frontal, portable view of the chest was obtained.    COMPARISON: Chest Radiograph dated 1/30/2023    FINDINGS:    Accessed left-sided chest wall port with tip in the SVC.  Heart size is normal.  Unchanged nodular opacities. Increased hazy opacities. Chain sutures are   seen bilaterally.  The visualized osseous structures demonstrate no acute pathology.      IMPRESSION:  Unchanged nodular opacities. Increased hazy opacities may represent   atelectasis or layering effusion    --- End of Report ---           TERRI TORRE MD; Resident Radiologist  This document has been electronically signed.  LOAN TODD MD; Attending Radiologist  This document has been electronically signed. Jan 31 2023  9:37AM    < end of copied text >          RECENT CULTURES:        RESPIRATORY CULTURES:          Studies  Chest X-RAY  CT SCAN Chest   Venous Dopplers: LE:   CT Abdomen  Others

## 2023-01-31 NOTE — PROGRESS NOTE ADULT - ASSESSMENT
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 She presented here from [facility] for elevated WBC and low hemoglobin, severe weakness. Pt states for the past 2-3 days has been having increased weakness and lethargy, decreased appetite. Reports chronic cough, currently nonproductive - RVP/COVID negative. Has multiple episodes of loose stools x weeks, reported recently trying marijuana for appetite and noted worsening. She was being treated empirically for c.diff with oral vancomycin but then became constipated, s/p bowel regimen and having normal bowel movements.     Sepsis vs SIRS -- unclear etiology  - was being monitor off abx as pt was afebrile, wbc was stable and no infectious source was found   - now having intermittent fevers - Tm 101.4F last night with change in mental status and worsening leukocytosis  - was started on cefepime yesterday and 1 set of Bcx was sent  - 1/30 RVP/COVID negative   - CXRs reviewed -- no new focal consolidation, unchanged nodular opacities, increased hazy opacities - possible atelectasis or layering effusion  - L chest port without sign of infection, confused, no other new findings on exam  - obtain CT chest without contrast if any worsening in oxygen requirements    - blood cultures x2 (1 set can be drawn from port), UA and Ucx ordered   - discontinued cefepime and broadened to meropenem 1g IV Q12h (renally adjusted)  - monitor temps/CBC    Fatigue/dyspnea likely due to severe anemia due to MDS s/p transfusions  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  H/o DLBCL, EBV+   - Pulmonary, Rheumatology and Heme/Onc following   - H/H noted - Hb 6.8 today, transfusions for Hb<7   - noted patient had similar presentation with bicytopenia and leukocytosis in the past, may need tx with rituximab   - s/p BMBx 1/23 - found with myelodysplastic syndrome    - quantiferon indeterminate - pt on steroids which can cause indeterminate results    - CT - s/p wedge resections in b/l upper lobes, 2 cm nodular opacity a/w staple line in RUL, ; multiple ill-defined b/l opacities more in RLL -unclear etiology, b/l effusions R>L   - continue on Bactrim DS 1 tablet daily for PCP ppx while on prednisone      D/w DEEPA Ferguson and JUSTIN Lucas  D/w Dr. Kurt Malave M.D.  [facility], Division of Infectious Diseases  [phone]  After 5pm on weekdays and all day on weekends - please call [phone]

## 2023-01-31 NOTE — PROGRESS NOTE ADULT - SUBJECTIVE AND OBJECTIVE BOX
Now on 2LNCO2, saturating 96%  Subjectively, work of breathing feels the same as yesterday  Hgb 6.8 this morning    Vital Signs Last 24 Hrs  T(C): 36.6 (31 Jan 2023 04:10), Max: 38.6 (30 Jan 2023 21:47)  T(F): 97.9 (31 Jan 2023 04:10), Max: 101.4 (30 Jan 2023 21:47)  HR: 98 (31 Jan 2023 04:10) (81 - 107)  BP: 116/70 (31 Jan 2023 04:10) (109/65 - 117/68)  BP(mean): --  RR: 18 (31 Jan 2023 04:10) (18 - 18)  SpO2: 96% (31 Jan 2023 04:10) (91% - 97%)    I&O's Summary      PHYSICAL EXAM:  GENERAL: NAD, well-developed, comfortable on nasal canula  HEAD:  Atraumatic, Normocephalic  EYES: EOMI, PERRLA, conjunctiva and sclera clear  NECK: Supple, No JVD  CHEST/LUNG: mild decrease breath sounds bilaterally; No wheeze   HEART: Regular rate and rhythm; No murmurs, rubs, or gallops  ABDOMEN: Soft, Nontender, Nondistended; Bowel sounds present  Neuro: AAOx3, no focal weakness, 5/5 b/l extremity strength  EXTREMITIES:  2+ Peripheral Pulses, No clubbing, cyanosis, trace b/l edema    LABS:                        6.8    74.20 )-----------( 26       ( 31 Jan 2023 07:05 )             22.1     01-31    134<L>  |  99  |  29<H>  ----------------------------<  48<LL>  4.9   |  19<L>  |  1.20    Ca    9.1      31 Jan 2023 07:05    TPro  6.3  /  Alb  3.5  /  TBili  0.6  /  DBili  x   /  AST  27  /  ALT  5<L>  /  AlkPhos  57  01-31      CAPILLARY BLOOD GLUCOSE      POCT Blood Glucose.: 138 mg/dL (31 Jan 2023 10:01)            RADIOLOGY & ADDITIONAL TESTS:    Imaging Personally Reviewed:  [x] YES  [ ] NO    Will obtain old records:  [ ] YES  [x] NO

## 2023-02-01 NOTE — CHART NOTE - NSCHARTNOTEFT_GEN_A_CORE
PA Medicine Note               Malaika Dooley PA-C PA Medicine Note     Notified by primary RN for RRT called for change of mental status. Patient is s/p 1U of PRBC. CT Head was ordered, prelim is negative for any acute findings. See RRT note for further details. Left message for overnight [employer] answering service. Patient is hemodynamically stable and remained on 5 [place].     Vital Signs Last 24 Hrs  T(C): 37 (31 Jan 2023 21:38), Max: 37.1 (31 Jan 2023 21:03)  T(F): 98.6 (31 Jan 2023 21:38), Max: 98.7 (31 Jan 2023 21:03)  HR: 82 (31 Jan 2023 21:03) (82 - 100)  BP: 104/62 (31 Jan 2023 21:38) (90/50 - 117/99)  BP(mean): --  RR: 18 (31 Jan 2023 21:38) (18 - 18)  SpO2: 95% (31 Jan 2023 21:38) (91% - 96%)    Parameters below as of 31 Jan 2023 21:38  Patient On (Oxygen Delivery Method): nasal cannula  O2 Flow (L/min): 2    Malaika Dooley PA-C  Department of Medicine PA Medicine Note     Notified by primary RN for RRT called for change of mental status. Patient is s/p 1U of PRBC. CT Head was ordered, prelim read is negative for any acute findings. See RRT note for further details. Left message for overnight [employer] answering service. Patient is hemodynamically stable and remained on 5 [place]. Will endorse overnight events to AM medicine team.     Vital Signs Last 24 Hrs  T(C): 37 (31 Jan 2023 21:38), Max: 37.1 (31 Jan 2023 21:03)  T(F): 98.6 (31 Jan 2023 21:38), Max: 98.7 (31 Jan 2023 21:03)  HR: 82 (31 Jan 2023 21:03) (82 - 100)  BP: 104/62 (31 Jan 2023 21:38) (90/50 - 117/99)  BP(mean): --  RR: 18 (31 Jan 2023 21:38) (18 - 18)  SpO2: 95% (31 Jan 2023 21:38) (91% - 96%)    Parameters below as of 31 Jan 2023 21:38  Patient On (Oxygen Delivery Method): nasal cannula  O2 Flow (L/min): 2    Malaika Dooley PA-C  Department of Medicine

## 2023-02-01 NOTE — PROGRESS NOTE ADULT - SUBJECTIVE AND OBJECTIVE BOX
SUBJECTIVE/ OVERNIGHT EVENTS:  awake alert but slightly more confused  no focal deficit  RRT event reviewed  CT head okay  abx adjusted by ID  no cp, no sob, no n/v/d. no abdominal pain.  no headache, no dizziness.   poor historian.         --------------------------------------------------------------------------------------------  LABS:                        9.9    57.56 )-----------( 12       ( 2023 07:11 )             29.0     -    136  |  102  |  30<H>  ----------------------------<  65<L>  3.8   |  21<L>  |  1.02    Ca    8.7      2023 07:11  Phos  3.6     -  Mg     1.8     -    TPro  6.2  /  Alb  3.5  /  TBili  1.2  /  DBili  x   /  AST  39  /  ALT  8<L>  /  AlkPhos  57  -      CAPILLARY BLOOD GLUCOSE      POCT Blood Glucose.: 95 mg/dL (2023 00:50)        Urinalysis Basic - ( 2023 09:57 )    Color: Yellow / Appearance: Slightly Turbid / S.033 / pH: x  Gluc: x / Ketone: Trace  / Bili: Negative / Urobili: Negative   Blood: x / Protein: 300 mg/dL / Nitrite: Negative   Leuk Esterase: Negative / RBC: 4 /hpf /  /HPF   Sq Epi: x / Non Sq Epi: 10 /hpf / Bacteria: Moderate        RADIOLOGY & ADDITIONAL TESTS:    Imaging Personally Reviewed:  [x] YES  [ ] NO    Consultant(s) Notes Reviewed:  [x] YES  [ ] NO    MEDICATIONS  (STANDING):  budesonide 160 MICROgram(s)/formoterol 4.5 MICROgram(s) Inhaler 2 Puff(s) Inhalation two times a day  chlorhexidine 2% Cloths 1 Application(s) Topical daily  cholecalciferol 2000 Unit(s) Oral daily  citalopram 10 milliGRAM(s) Oral daily  fluticasone propionate 50 MICROgram(s)/spray Nasal Spray 1 Spray(s) Both Nostrils two times a day  folic acid 1 milliGRAM(s) Oral daily  guaiFENesin  milliGRAM(s) Oral every 12 hours  influenza  Vaccine (HIGH DOSE) 0.7 milliLiter(s) IntraMuscular once  ipratropium    for Nebulization 500 MICROGram(s) Nebulizer every 6 hours  meropenem  IVPB 1000 milliGRAM(s) IV Intermittent every 12 hours  metoprolol tartrate 25 milliGRAM(s) Oral two times a day  mirtazapine 7.5 milliGRAM(s) Oral daily  pantoprazole    Tablet 40 milliGRAM(s) Oral before breakfast  predniSONE   Tablet 20 milliGRAM(s) Oral daily  senna 1 Tablet(s) Oral daily  sodium bicarbonate 650 milliGRAM(s) Oral three times a day  trimethoprim  160 mG/sulfamethoxazole 800 mG 1 Tablet(s) Oral daily  vancomycin  IVPB 750 milliGRAM(s) IV Intermittent every 24 hours    MEDICATIONS  (PRN):  acetaminophen     Tablet .. 650 milliGRAM(s) Oral every 6 hours PRN Temp greater or equal to 38C (100.4F), Mild Pain (1 - 3)  aluminum hydroxide/magnesium hydroxide/simethicone Suspension 30 milliLiter(s) Oral every 4 hours PRN Dyspepsia  melatonin 3 milliGRAM(s) Oral at bedtime PRN Insomnia  ondansetron Injectable 4 milliGRAM(s) IV Push every 8 hours PRN Nausea and/or Vomiting  polyethylene glycol 3350 17 Gram(s) Oral daily PRN Constipation      Care Discussed with Consultants/Other Providers [x] YES  [ ] NO    Vital Signs Last 24 Hrs  T(C): 37.3 (2023 10:58), Max: 37.4 (2023 00:40)  T(F): 99.1 (2023 10:58), Max: 99.3 (2023 00:40)  HR: 95 (2023 10:58) (82 - 100)  BP: 106/66 (2023 10:58) (104/62 - 141/66)  BP(mean): --  RR: 18 (2023 10:58) (18 - 18)  SpO2: 98% (2023 10:58) (95% - 98%)    Parameters below as of 2023 10:58  Patient On (Oxygen Delivery Method): nasal cannula  O2 Flow (L/min): 2    I&O's Summary    2023 07:01  -  2023 07:00  --------------------------------------------------------  IN: 100 mL / OUT: 0 mL / NET: 100 mL        PHYSICAL EXAM:  GENERAL: NAD, well-developed, comfortable  HEAD:  Atraumatic, Normocephalic  EYES: EOMI, PERRLA, conjunctiva and sclera clear  NECK: Supple, No JVD  CHEST/LUNG: mild decrease breath sounds bilaterally; No wheeze   HEART: Regular rate and rhythm; No murmurs, rubs, or gallops  ABDOMEN: Soft, Nontender, Nondistended; Bowel sounds present  Neuro: awake alert. slightly confused. no focal weakness  EXTREMITIES:  2+ Peripheral Pulses, No clubbing, cyanosis, trace b/l edema

## 2023-02-01 NOTE — PROGRESS NOTE ADULT - SUBJECTIVE AND OBJECTIVE BOX
INTERVAL HPI/OVERNIGHT EVENTS:    MEDICATIONS  (STANDING):  budesonide 160 MICROgram(s)/formoterol 4.5 MICROgram(s) Inhaler 2 Puff(s) Inhalation two times a day  chlorhexidine 2% Cloths 1 Application(s) Topical daily  cholecalciferol 2000 Unit(s) Oral daily  citalopram 10 milliGRAM(s) Oral daily  fluticasone propionate 50 MICROgram(s)/spray Nasal Spray 1 Spray(s) Both Nostrils two times a day  folic acid 1 milliGRAM(s) Oral daily  guaiFENesin  milliGRAM(s) Oral every 12 hours  influenza  Vaccine (HIGH DOSE) 0.7 milliLiter(s) IntraMuscular once  ipratropium    for Nebulization 500 MICROGram(s) Nebulizer every 6 hours  meropenem  IVPB 1000 milliGRAM(s) IV Intermittent every 12 hours  metoprolol tartrate 25 milliGRAM(s) Oral two times a day  mirtazapine 7.5 milliGRAM(s) Oral daily  pantoprazole    Tablet 40 milliGRAM(s) Oral before breakfast  predniSONE   Tablet 20 milliGRAM(s) Oral daily  senna 1 Tablet(s) Oral daily  sodium bicarbonate 650 milliGRAM(s) Oral three times a day  trimethoprim  160 mG/sulfamethoxazole 800 mG 1 Tablet(s) Oral daily  vancomycin  IVPB 750 milliGRAM(s) IV Intermittent every 24 hours    MEDICATIONS  (PRN):  acetaminophen     Tablet .. 650 milliGRAM(s) Oral every 6 hours PRN Temp greater or equal to 38C (100.4F), Mild Pain (1 - 3)  aluminum hydroxide/magnesium hydroxide/simethicone Suspension 30 milliLiter(s) Oral every 4 hours PRN Dyspepsia  melatonin 3 milliGRAM(s) Oral at bedtime PRN Insomnia  ondansetron Injectable 4 milliGRAM(s) IV Push every 8 hours PRN Nausea and/or Vomiting  polyethylene glycol 3350 17 Gram(s) Oral daily PRN Constipation        Vital Signs Last 24 Hrs  T(C): 37.3 (2023 10:58), Max: 37.4 (2023 00:40)  T(F): 99.1 (2023 10:58), Max: 99.3 (2023 00:40)  HR: 95 (2023 10:58) (82 - 100)  BP: 106/66 (2023 10:58) (104/62 - 141/66)  BP(mean): --  RR: 18 (2023 10:58) (18 - 18)  SpO2: 98% (2023 10:58) (95% - 98%)    Parameters below as of 2023 10:58  Patient On (Oxygen Delivery Method): nasal cannula  O2 Flow (L/min): 2      PHYSICAL EXAMINATION:  Constitutional:  Pulmonary:  Cardio:  Abdomen:  Musculoskeletal:  Skin:  Neurological:  Psychiatric:    LABS:                        9.9    57.56 )-----------( 12       ( 2023 07:11 )             29.0     02-01    136  |  102  |  30<H>  ----------------------------<  65<L>  3.8   |  21<L>  |  1.02    Ca    8.7      2023 07:11  Phos  3.6     02-01  Mg     1.8     02-    TPro  6.2  /  Alb  3.5  /  TBili  1.2  /  DBili  x   /  AST  39  /  ALT  8<L>  /  AlkPhos  57  02-01      Urinalysis Basic - ( 2023 09:57 )    Color: Yellow / Appearance: Slightly Turbid / S.033 / pH: x  Gluc: x / Ketone: Trace  / Bili: Negative / Urobili: Negative   Blood: x / Protein: 300 mg/dL / Nitrite: Negative   Leuk Esterase: Negative / RBC: 4 /hpf /  /HPF   Sq Epi: x / Non Sq Epi: 10 /hpf / Bacteria: Moderate          RADIOLOGY & ADDITIONAL TESTS:   INTERVAL HPI/OVERNIGHT EVENTS:  Patient lying in bed. Oriented to name only. Denies any pain, hemoptysis, shortness of breath. Nosebleed noted. Son at bedside    MEDICATIONS  (STANDING):  budesonide 160 MICROgram(s)/formoterol 4.5 MICROgram(s) Inhaler 2 Puff(s) Inhalation two times a day  chlorhexidine 2% Cloths 1 Application(s) Topical daily  cholecalciferol 2000 Unit(s) Oral daily  citalopram 10 milliGRAM(s) Oral daily  fluticasone propionate 50 MICROgram(s)/spray Nasal Spray 1 Spray(s) Both Nostrils two times a day  folic acid 1 milliGRAM(s) Oral daily  guaiFENesin  milliGRAM(s) Oral every 12 hours  influenza  Vaccine (HIGH DOSE) 0.7 milliLiter(s) IntraMuscular once  ipratropium    for Nebulization 500 MICROGram(s) Nebulizer every 6 hours  meropenem  IVPB 1000 milliGRAM(s) IV Intermittent every 12 hours  metoprolol tartrate 25 milliGRAM(s) Oral two times a day  mirtazapine 7.5 milliGRAM(s) Oral daily  pantoprazole    Tablet 40 milliGRAM(s) Oral before breakfast  predniSONE   Tablet 20 milliGRAM(s) Oral daily  senna 1 Tablet(s) Oral daily  sodium bicarbonate 650 milliGRAM(s) Oral three times a day  trimethoprim  160 mG/sulfamethoxazole 800 mG 1 Tablet(s) Oral daily  vancomycin  IVPB 750 milliGRAM(s) IV Intermittent every 24 hours    MEDICATIONS  (PRN):  acetaminophen     Tablet .. 650 milliGRAM(s) Oral every 6 hours PRN Temp greater or equal to 38C (100.4F), Mild Pain (1 - 3)  aluminum hydroxide/magnesium hydroxide/simethicone Suspension 30 milliLiter(s) Oral every 4 hours PRN Dyspepsia  melatonin 3 milliGRAM(s) Oral at bedtime PRN Insomnia  ondansetron Injectable 4 milliGRAM(s) IV Push every 8 hours PRN Nausea and/or Vomiting  polyethylene glycol 3350 17 Gram(s) Oral daily PRN Constipation        Vital Signs Last 24 Hrs  T(C): 37.3 (2023 10:58), Max: 37.4 (2023 00:40)  T(F): 99.1 (2023 10:58), Max: 99.3 (2023 00:40)  HR: 95 (2023 10:58) (82 - 100)  BP: 106/66 (2023 10:58) (104/62 - 141/66)  BP(mean): --  RR: 18 (2023 10:58) (18 - 18)  SpO2: 98% (2023 10:58) (95% - 98%)    Parameters below as of 2023 10:58  Patient On (Oxygen Delivery Method): nasal cannula  O2 Flow (L/min): 2      PHYSICAL EXAMINATION:  General: Not in acute distress  HEENT: EOMI, MMM  CVS: +S1/S2, RRR, no murmurs/rubs/gallops, Chemo-port  Resp: On 2L of ncO2. Anterior CTA b/l  GI: Soft, NT/ND +BS  MSK: no synovitis, Unable to perform full strength exam as partially following commands  Neuro: AAOx1  Skin: non blanching multiple purpuric lesions in the lower extremities, arms, and chest     LABS:                        9.9    57.56 )-----------( 12       ( 2023 07:11 )             29.0     02-01    136  |  102  |  30<H>  ----------------------------<  65<L>  3.8   |  21<L>  |  1.02    Ca    8.7      2023 07:11  Phos  3.6     02-  Mg     1.8     02-    TPro  6.2  /  Alb  3.5  /  TBili  1.2  /  DBili  x   /  AST  39  /  ALT  8<L>  /  AlkPhos  57  02-01      Urinalysis Basic - ( 2023 09:57 )    Color: Yellow / Appearance: Slightly Turbid / S.033 / pH: x  Gluc: x / Ketone: Trace  / Bili: Negative / Urobili: Negative   Blood: x / Protein: 300 mg/dL / Nitrite: Negative   Leuk Esterase: Negative / RBC: 4 /hpf /  /HPF   Sq Epi: x / Non Sq Epi: 10 /hpf / Bacteria: Moderate          RADIOLOGY & ADDITIONAL TESTS:

## 2023-02-01 NOTE — PROGRESS NOTE ADULT - ASSESSMENT
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 Patient was sent to Rehab. Subsequently on 1/18 patient is readmitted for general weakness w/hb 5. Rheumatology was consulted for further help in management.    #AAV-MPO  -Patient w/hx of mononeuritis multiplex, kidney  and lung involvement,  -Previous treatment with RTX, transitioned to AZA and off any therapy while receiving R-CHOP   -CT chest IN 11/2022 Increased mediastinal lymphadenopathy. A reference low right paratracheal node measures 2.4 x 1.8 cm (2, 39), interval increase in size from prior when it measured 2.0 x 1.2 cm. -11/10/22 R wedge resection showed capillaritis with fibrin, giant cells and inflammation around small and intermediate sized vessels. Elastic stain showed vasculocentricity of the inflammation. Lymph node showed:  Focal giant cells and focal necrosis are seen in the lymph node as well   -CT chest on this admission showed multiple ill-defined opacities are noted within both lungs, more so in the right lower lobe. Exact etiology is unclear. Bilateral pleural effusions, right more than left.  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 faecalis and MRSE  - on Vancomycin and Meropenem      Plan  -Patient  on 2-3L of ncO2; monitor for hemoptysis  -c/w 20 mg prednisone daily  -ID recs appreciated; f/u infectious workup  -Hematology recs appreciated: Myelodysplastic treatment will not involve Rituximab  -given current infection and eventual MDS treatment; will likely consider alternative agent to Rituximab including mono steroid therapy for pulmonary vasculitis  -c/w PCP prophylaxis and PPI ppx     To be discussed with Dr. 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 Patient was sent to Rehab. Subsequently on 1/18 patient is readmitted for general weakness w/hb 5. Rheumatology was consulted for further help in management.    #AAV-MPO  -Patient w/hx of mononeuritis multiplex, kidney  and lung involvement,  -Previous treatment with RTX, transitioned to AZA and off any therapy while receiving R-CHOP   -CT chest IN 11/2022 Increased mediastinal lymphadenopathy. A reference low right paratracheal node measures 2.4 x 1.8 cm (2, 39), interval increase in size from prior when it measured 2.0 x 1.2 cm. -11/10/22 R wedge resection showed capillaritis with fibrin, giant cells and inflammation around small and intermediate sized vessels. Elastic stain showed vasculocentricity of the inflammation. Lymph node showed:  Focal giant cells and focal necrosis are seen in the lymph node as well   -CT chest on this admission showed multiple ill-defined opacities are noted within both lungs, more so in the right lower lobe. Exact etiology is unclear. Bilateral pleural effusions, right more than left.  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 faecalis and MRSE  - on Vancomycin and Meropenem      Plan  -Patient  on 2-3L of ncO2; monitor for hemoptysis  -f/u MPO vs PR3 antibodies  -c/w 20 mg prednisone daily  -ID recs appreciated; f/u infectious workup  -Hematology recs appreciated: Myelodysplastic treatment will not involve Rituximab  -given current infection and eventual MDS treatment; will likely consider alternative agent to Rituximab including continuing mono steroid therapy for pulmonary vasculitis  -c/w PCP prophylaxis and PPI ppx     To be discussed with Dr. 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 Patient was sent to Rehab. Subsequently on 1/18 patient is readmitted for general weakness w/hb 5. Rheumatology was consulted for further help in management.    #AAV-MPO  -Patient w/hx of mononeuritis multiplex, kidney  and lung involvement,  -Previous treatment with RTX, transitioned to AZA and off any therapy while receiving R-CHOP   -CT chest IN 11/2022 Increased mediastinal lymphadenopathy. A reference low right paratracheal node measures 2.4 x 1.8 cm (2, 39), interval increase in size from prior when it measured 2.0 x 1.2 cm. -11/10/22 R wedge resection showed capillaritis with fibrin, giant cells and inflammation around small and intermediate sized vessels. Elastic stain showed vasculocentricity of the inflammation. 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 faecalis and MRSE  - on Vancomycin and Meropenem      Plan  -Patient  on 2-3L of ncO2; monitor for hemoptysis  -f/u MPO vs PR3 antibodies  -c/w 20 mg prednisone daily  -ID recs appreciated; f/u infectious workup  -Hematology recs appreciated: Myelodysplastic treatment will not involve Rituximab  -no further immunosuppression for ANCA vasculitis until infection resolves  -will discuss with family treatment options for ANCA vasculitis including risks and benefits given the severity of her current illness  -c/w PCP prophylaxis and PPI ppx     Discussed with Dr. Cruz, Attending    Jesus Ritter MD  Rheumatology Fellow, PGY-5  Available on TEAMS

## 2023-02-01 NOTE — PROGRESS NOTE ADULT - ASSESSMENT
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 She presented here from [facility] for elevated WBC and low hemoglobin, severe weakness. Pt states for the past 2-3 days has been having increased weakness and lethargy, decreased appetite. +sputum productive cough. + cui, no sob, no difficulty breathing. No abdominal pain, nausea, vomiting, no bloody stools. Has endorsed multiple episodes of loose stools x weeks, currently being treated for c.diff with oral vancomycin.       Fatigue/dyspnea: likely due to severe anemia :  pt s/p 1 unit PRB  Diarrhea  Pulmonary vasculitis  :  hx of Tachycardia :  Recent TTE on 11/3/22 reviewed: normal LV. outpt card Dr. Ady Cooper  Anxiety and depression  GERD (gastroesophageal reflux disease)  Hyperlipidemia.   DVT ppx     1/20:  Fatigue/dyspnea: likely due to severe anemia :  pt s/p 1 unit PRBC: hb now  > 10  : she is on 2 L of oxygen : no wheezing: no overt signs of bleeding : ct chest is abnormal report noted:  has jean-claude ill defined opacities of unclear etiology  : venous ph is mild acidotic:  no need for Bipap at this time : monitor and trend hb  Diarrhea : symptomatic rx:  workup per primary team  Pulmonary vasculitis  : per rheumatology  : had recent vats surgery : LN biopsy noted:   hx of Tachycardia :  Recent TTE on 11/3/22 reviewed: normal LV. outpt card Dr. Ady Cooper  Anxiety and depression  GERD (gastroesophageal reflux disease) : ppi   Hyperlipidemia.   recent covid  : o n last admission she was treated for covid infection:   DVT ppx   d wteam    1/22:    Fatigue/dyspnea: likely due to severe anemia :  pt s/p 1 unit PRBC: hb now  > 10  : she is on 2 L of oxygen : no wheezing: no overt signs of bleeding : ct chest is abnormal report noted:  has jean-claude ill defined opacities of unclear etiology  : venous ph is mild acidotic:  no need for Bipap at this time : monitor and trend hb: she remained stable o gracie last 1 days:  she is not on any antibtiocs at this time: RVP  and blood cultures are negative: she had ebus biopsy also before: reviewed: ID following  Diarrhea : symptomatic rx:  workup per primary team : seems to have resolved  Pulmonary vasculitis  : per rheumatology  : had recent vats surgery : LN biopsy noted:   Bicytopneia and leucocytosis: ? etiology  : for possible bone marrow biopsy on Monday    hx of Tachycardia :  Recent TTE on 11/3/22 reviewed: normal LV. outpt card Dr. Ady Cooper  Anxiety and depression  GERD (gastroesophageal reflux disease) : ppi   Hyperlipidemia.   recent covid  : on last admission she was treated for covid infection:   DVT ppx   dw team    1/23:    Fatigue/dyspnea: likely due to severe anemia : feels weak  but no bleeding noted:  on 2 L of oxygen : she needs PT OT   Diarrhea :resolved:   Pulmonary vasculitis  : per rheumatology  : had recent vats surgery : LN biopsy noted: : she never received teat ment for vasculitis except low dose steroids:  she is awaiting bone marrow biopsy prior to induction therapy with rituximab: The ct chest done on this admission does not show pneumothorax and has jean-claude effusions:  There are some new opacites in rigth lower lobe which were not therre:  clinically she does not seem to have pneumonia: ID following: could it be a prt of vasculitis  Bicytopneia and leucocytosis: ? etiology  : for possible bone marrow biopsy today  hx of Tachycardia :  Recent TTE on 11/3/22 reviewed: normal LV. outpt card Dr. Ady Cooper  Anxiety and depression  GERD (gastroesophageal reflux disease) : ppi   Hyperlipidemia.   recent covid  : on last admission she was treated for covid infection:   DVT ppx   dw team    1/24:    Fatigue/dyspnea: likely due to severe anemia : feels weak  but no bleeding noted:  on 2 L of oxygen : she needs PT OT   Diarrhea :resolved:   Pulmonary vasculitis  : per rheumatology  : had recent vats surgery : LN biopsy noted: : she never received teat ment for vasculitis except low dose steroids:  she is awaiting bone marrow biopsy prior to induction therapy with rituximab: The ct chest done on this admission does not show pneumothorax and has jean-claude effusions:  There are some new opacites in rigth lower lobe which were not therre:  clinically she does not seem to have pneumonia: ID following: could it be a prt of vasculitis  Bicytopneia and leucocytosis: BM biopsy done: await results for eventual immunosuppressives therapy:   hx of Tachycardia :  Recent TTE on 11/3/22 reviewed: normal LV. outpt card Dr. Ady Cooper  Anxiety and depression  GERD (gastroesophageal reflux disease) : ppi   Hyperlipidemia.   recent covid  : on last admission she was treated for covid infection:   DVT ppx   dw team    1/25:    Fatigue/dyspnea: likely due to severe anemia : feels weak  but no bleeding noted:  on 2 L of oxygen : she needs PT OT   Diarrhea :resolved:   Pulmonary vasculitis  : per rheumatology  : had recent vats surgery : LN biopsy noted: : she never received teat ment for vasculitis except low dose steroids:  she is awaiting bone marrow biopsy prior to induction therapy with rituximab: The ct chest done on this admission does not show pneumothorax and has jean-claude effusions:  There are some new opacites in rigth lower lobe which were not there:  clinically she does not seem to have pneumonia: ID following: could it be a part of vasculitis : her resp status has not changed   Bicytopneia and leucocytosis: BM biopsy done: await results for still pending   hx of Tachycardia :  Recent TTE on 11/3/22 reviewed: normal LV. outpt card Dr. Ady Cooper  Anxiety and depression  GERD (gastroesophageal reflux disease) : ppi   Hyperlipidemia.   recent covid  : on last admission she was treated for covid infection:   DVT ppx   dw team: awaiting decision on immunosuppression     1/26;      Fatigue/dyspnea: likely due to severe anemia : feels weak  but no bleeding noted:  on 2 L of oxygen : she needs PT OT : got it yesterday    Diarrhea :resolved:   Pulmonary vasculitis  : per rheumatology  : had recent vats surgery : LN biopsy noted: : she never received teat ment for vasculitis except low dose steroids:  she is awaiting bone marrow biopsy prior to induction therapy with rituximab: The ct chest done on this admission does not show pneumothorax and has jean-claude effusions:  There are some new opacities in rigth lower lobe which were not there:  clinically she does not seem to have pneumonia: ID following: could it be a part of vasculitis : her resp status has not changed  : being observed off antibiotics   Bicytopneia and leucocytosis: BM biopsy done: await results for still pending   hx of Tachycardia :  Recent TTE on 11/3/22 reviewed: normal LV. outpt card Dr. Ady Cooper  Anxiety and depression  GERD (gastroesophageal reflux disease) : ppi   Hyperlipidemia.   recent covid  : on last admission she was treated for covid infection:   DVT ppx   dw team: awaiting decision on immunosuppression     1/27:    Fatigue/dyspnea: likely due to severe anemia : feels weak  but no bleeding noted:  on 2 L of oxygen : cont  PT OT :   Diarrhea :resolved:   Pulmonary vasculitis  : per rheumatology  : had recent vats surgery : LN biopsy noted: : she never received teat ment for vasculitis except low dose steroids:  she is awaiting bone marrow biopsy prior to induction therapy with rituximab: The ct chest done on this admission does not show pneumothorax and has jean-claude effusions:  There are some new opacities in rigth lower lobe which were not there:  clinically she does not seem to have pneumonia: ID following: could it be a part of vasculitis : her resp status has not changed  : being observed off antibiotics :  on bactrim prophylaxis as she is on chr steroids   Bicytopneia and leucocytosis: BM biopsy done: await results for still pending   hx of Tachycardia :  Recent TTE on 11/3/22 reviewed: normal LV. outpt card Dr. Ady Cooper  Anxiety and depression  GERD (gastroesophageal reflux disease) : ppi   Hyperlipidemia.   recent covid  : on last admission she was treated for covid infection:   DVT ppx   dw team: awaiting decision on immunosuppression     1/29:    Fatigue/dyspnea: likely due to severe anemia : feels weak  but no bleeding noted:  on 2 L of oxygen : cont  PT OT :   Diarrhea :resolved:   Pulmonary vasculitis  : per rheumatology  : had recent vats surgery : LN biopsy noted: : she never received treat ment for vasculitis except low dose steroids:  she is awaiting bone marrow biopsy prior to induction therapy with rituximab: The ct chest done on this admission does not show pneumothorax and has jean-claude effusions:  There are some new opacities in rigth lower lobe which were not there:  clinically she does not seem to have pneumonia: ID following: could it be a part of vasculitis : her resp status has not changed  : being observed off antibiotics :  on bactrim prophylaxis as she is on chr steroids   Bicytopneia and leucocytosis: BM biopsy: results reviewed defer to [facility]  hx of Tachycardia :  Recent TTE on 11/3/22 reviewed: normal LV.   Anxiety and depression  GERD (gastroesophageal reflux disease) : ppi   Hyperlipidemia.   recent covid  : on last admission she was treated for covid infection:   DVT ppx   dw team: awaiting decision on immunosuppression     1/30:    Fatigue/dyspnea: likely due to severe anemia : feels weak  but no bleeding noted:  on 2 L of oxygen : cont  PT OT : sitting in chair today   Diarrhea :resolved:   Pulmonary vasculitis  : per rheumatology  : had recent vats surgery : LN biopsy noted: : she never received treat ment for vasculitis except low dose steroids:  she is awaiting bone marrow biopsy prior to induction therapy with rituximab: The ct chest done on this admission does not show pneumothorax and has jean-claude effusions:  There are some new opacities in rigth lower lobe which were not there:  clinically she does not seem to have pneumonia: ID following: could it be a part of vasculitis : her resp status has not changed  : being observed off antibiotics :  on bactrim prophylaxis as she is on chr steroids  /l however she had low grade tempt today : rvp is negative : cultures sent: ID follow up   Bicytopneia and leucocytosis: BM biopsy: results reviewed defer to [facility]  hx of Tachycardia :  Recent TTE on 11/3/22 reviewed: normal LV.   Anxiety and depression  GERD (gastroesophageal reflux disease) : ppi   Hyperlipidemia.   recent covid  : on last admission she was treated for covid infection:   DVT ppx   dw team: awaiting decision on immunosuppression     1/31:    Fatigue/dyspnea: likely due to severe anemia : feels weak  but no bleeding noted:  on 2 L of oxygen : cont  PT OT : lying in bed:   Diarrhea :resolved:   Pulmonary vasculitis  : per rheumatology  : had recent vats surgery : LN biopsy noted: : she never received treat ment for vasculitis except low dose steroids:  she is awaiting bone marrow biopsy prior to induction therapy with rituximab: The ct chest done on this admission does not show pneumothorax and has jean-claude effusions:  There are some new opacities in rigth lower lobe which were not there:  clinically she does not seem to have pneumonia: ID following: could it be a part of vasculitis : her resp status has not changed  : being observed off antibiotics :  on bactrim prophylaxis as she is on chr steroids : she seems OK:  no sob:     Bicytopneia and leucocytosis: BM biopsy: results reviewed defer to [facility] ; for eventual chemo   hx of Tachycardia :  Recent TTE on 11/3/22 reviewed: normal LV.   Anxiety and depression  GERD (gastroesophageal reflux disease) : ppi   Hyperlipidemia.   recent covid  : on last admission she was treated for covid infection:   DVT ppx   dw team: awaiting decision on immunosuppression     2/1:    Fatigue/dyspnea: likely due to severe anemia : feels weak  but no bleeding noted:  on 2 L of oxygen : cont  PT OT : lying in bed:  her blood cultures are contaminant:   Diarrhea :resolved:   Pulmonary vasculitis  : per rheumatology  : had recent vats surgery : LN biopsy noted: : she never received treat ment for vasculitis except low dose steroids:  she is awaiting bone marrow biopsy prior to induction therapy with rituximab: The ct chest done on this admission does not show pneumothorax and has jean-claude effusions:  There are some new opacities in rigth lower lobe which were not there:  clinically she does not seem to have pneumonia: ID following: could it be a part of vasculitis : her resp status has not changed  : being observed off antibiotics :  on bactrim prophylaxis as she is on chr steroids : she seems OK:  no sob:     Bicytopneia and leucocytosis: BM biopsy: results reviewed defer to [facility] ; for eventual chemo   hx of Tachycardia :  Recent TTE on 11/3/22 reviewed: normal LV.   Anxiety and depression  GERD (gastroesophageal reflux disease) : ppi   Hyperlipidemia.   recent covid  : on last admission she was treated for covid infection:   DVT ppx   dw team: awaiting decision on immunosuppression: overall her general condition seems very poor and very weak:

## 2023-02-01 NOTE — RAPID RESPONSE TEAM SUMMARY - NSSITUATIONBACKGROUNDRRT_GEN_ALL_CORE
This is a 82 year old female with a PMH of diffuse large B cell lymphoma in remission, non-hodgkin's lymphoma (chemoport), depression, HLD, GERD, PE/DVT (4/2021 no longer on Eliquis), ANCA-vasculitis (20 y/a, no meds), s/p LVATS, LUIS wedge resection (2021), recent direct admit for RVATS, RUL Nodule Biopsy w/ IR marking in [facility], path favoring vasculitis, treated with Decadron, now presented here from [facility] for elevated WBC and low hemoglobin, severe weakness. RRT called for AMS change. On arrival to RRT, pt in bed, follows commands, equal strength in both b/l upper and lower extremities, b/l pupil 3mm and brisk, but pt only responding to yes/no questions. As per the nurse, pt just finished a unit of PRBC in 3hrs. Prior to that she was slightly lethargic but answered all questions appropriately. Pt's VS - HR=90s, SBP in 140s, o2sat 100% on 3LNC, febrile 99.9 axillary. Gave Tylenol IV x 1, send all labs including lactate. CT head ordered. F/u with labs and CT head. Optimize electrolytes. Mental status change probably secondary to metabolic derangement vs TBA. Will monitor her closely.     This is a 82 year old female with a PMH of diffuse large B cell lymphoma in remission, non-hodgkin's lymphoma (chemoport), depression, HLD, GERD, PE/DVT (4/2021 no longer on Eliquis), ANCA-vasculitis (20 y/a, no meds), s/p LVATS, LUIS wedge resection (2021), recent direct admit for RVATS, RUL Nodule Biopsy w/ IR marking in [facility], path favoring vasculitis, treated with Decadron, now presenting here from [facility] for elevated WBC, low hemoglobin, and severe weakness. RRT called for AMS change. On arrival to RRT, pt in bed, follows commands, equal strength in both b/l upper and lower extremities, b/l pupil 3mm and brisk, but pt only responding to yes/no questions. As per the nurse, pt just finished a unit of PRBC in 3hrs. Prior to that she was slightly lethargic but answered all questions appropriately. Pt's VS - HR=90s, SBP in 140s, o2sat 100% on 3LNC, febrile 99.9 axillary. Gave Tylenol IV x 1, send all labs including lactate. CT head ordered. F/u with labs and CT head. Optimize electrolytes. Mental status change probably secondary to metabolic derangement vs TBA. Will monitor her closely.     This is a 82 year old female with a PMH of diffuse large B cell lymphoma in remission, non-hodgkin's lymphoma (chemoport), depression, HLD, GERD, PE/DVT (4/2021 no longer on Eliquis), ANCA-vasculitis (20 y/a, no meds), s/p LVATS, LUIS wedge resection (2021), recent direct admit for RVATS, RUL Nodule Biopsy w/ IR marking in [facility], path favoring vasculitis, treated with Decadron, now presenting here from [facility] for elevated WBC, low hemoglobin, and severe weakness. RRT called for AMS change. On arrival to RRT, pt in bed, follows commands, equal strength in both b/l upper and lower extremities, b/l pupil 3mm and brisk, no facial droop, but pt only responding to yes/no questions. As per the nurse, pt just finished a unit of PRBC in 3hrs. Prior to that she was slightly lethargic but answered all questions appropriately. Pt's VS - HR=90s, SBP in 140s, o2sat 100% on 3LNC, febrile 99.9 axillary. Gave Tylenol IV x 1, send all labs including lactate. CT head ordered. F/u with labs and CT head. Optimize electrolytes. Mental status change probably secondary to metabolic derangement vs TBA. Will monitor her closely.

## 2023-02-01 NOTE — PROGRESS NOTE ADULT - ASSESSMENT
82 yr old female with a PMHx of diffuse large B cell lymphoma in remission, non-hodgkin's lymphoma (chemoport), depression, HLD, GERD, PE/DVT (4/2021 no longer on Eliquis), ANCA-vasculitis (20 y/a, no meds), s/p LVATS, LUIS wedge resection (2021), recent direct admit for RVATS, RUL Nodule Biopsy w/ IR marking in [facility], path favoring vasculitis, treated with Decadron, then switched to PO prednisone, went to [facility]. She presented here from [facility] for elevated WBC and low hemoglobin, severe weakness. Pt states for the past 2-3 days has been having increased weakness and lethargy, decreased appetite. +sputum productive cough. + cui, no sob, no difficulty breathing. No abdominal pain, nausea, vomiting, no bloody stools. Has endorsed multiple episodes of loose stools x weeks, currently being treated for c.diff with oral vancomycin.       Fatigue/dyspnea: due to severe anemia  - 2' MDS  - transfuse to keep Hgb above 7.0  - no melena, no hematochezia. no BRBPR. occult stool neg.   - she tends to require IV Lasix intermittently post transfusion.  - doubt GI bleed   - Physical therapy. Out of bed to chair with assistance.     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  - s/p Vanco PO a few days (started in rehab)  - diarrhea completely resolved.   - monitor off PO vanco per ID  - now constipated. PRN bowel regimen started. +BM    Fever, not neutropenic, starting 1/30/23 @ 5:05 AM  - RVP 1/30/23 (-)  - monitor blood cxs 1/30/23  - started empirically on cefepime 1/30/23, switched to merrem 1/31/23    AMS  - unclear etiology, likely metabolic encephalopathy  - CT head neg. sugars stable  - vanco IV added per ID.   - close monitoring   - monitor glucose.     Pulmonary vasculitis   - Recent TTE on 11/3/22 reviewed: normal LV. outpt card Dr. Ady Cooper  - outpt pulm Mack Tucker   - s/p thoracoscopic biopsy of mediastinal lymph node and Lung wedge resection 11/10/22  - recent pleural effusion s/p 650 cc U/S-guided rt thoracentesis 11/17/22  - exudative but no neutrophilic predominance and initial Gram stain w/o organisms  - cultures neg.   - path results favoring vasculitis: no evidence for lymphoma. Cytology also came back negative.   - comfortable on nasal canula, better post diuretic last admission.   - rheum and heme-onc following previously, will reconsult.   - last admission, she was not started on immunosuppressants such as cellcept given recent treatment for COVID19 at that time  - c/w prednisone 20 mg qdaily.   - RTX under consideration --> acute hepatitis panel (-) and quantiferon indeterminate  - ID started PCP ppx with Bactrim.     hx of Tachycardia    - cont low-dose metoprolol, 50 mg to 25 mg dose reduced in rehab.   - card consult (Dr. Hooker) in the past, if needed    Anxiety and depression  - stable, continue citalopram and Remeron.  - Pt denied SI/HI ideations, denied visual and auditory hallucinations.     GERD (gastroesophageal reflux disease)  - continue PPI    Hyperlipidemia.   - continue home ezetimibe. okay to hold if nonformulary     DVT ppx   - holding AC in the setting of anemia, pancytopenia.   - sachin noel LEs          82 yr old female with a PMHx of diffuse large B cell lymphoma in remission, non-hodgkin's lymphoma (chemoport), depression, HLD, GERD, PE/DVT (4/2021 no longer on Eliquis), ANCA-vasculitis (20 y/a, no meds), s/p LVATS, LUIS wedge resection (2021), recent direct admit for RVATS, RUL Nodule Biopsy w/ IR marking in [facility], path favoring vasculitis, treated with Decadron, then switched to PO prednisone, went to [facility]. She presented here from [facility] for elevated WBC and low hemoglobin, severe weakness. Pt states for the past 2-3 days has been having increased weakness and lethargy, decreased appetite. +sputum productive cough. + cui, no sob, no difficulty breathing. No abdominal pain, nausea, vomiting, no bloody stools. Has endorsed multiple episodes of loose stools x weeks, currently being treated for c.diff with oral vancomycin.       Fatigue/dyspnea: due to severe anemia  - 2' MDS  - transfuse to keep Hgb above 7.0  - no melena, no hematochezia. no BRBPR. occult stool neg.   - she tends to require IV Lasix intermittently post transfusion.  - doubt GI bleed   - Physical therapy. Out of bed to chair with assistance.     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  - s/p Vanco PO a few days (started in rehab)  - diarrhea completely resolved.   - monitor off PO vanco per ID  - now constipated. PRN bowel regimen started. +BM    Fever, not neutropenic, starting 1/30/23 @ 5:05 AM  - RVP 1/30/23 (-)  - monitor blood cxs 1/30/23  - started empirically on cefepime 1/30/23, switched to merrem 1/31/23    AMS  - unclear etiology, likely metabolic encephalopathy  - CT head neg. sugars stable  - vanco IV added per ID.   - close monitoring   - monitor glucose (glucose 65 on BMP, but 95 on fingersticks).    Pulmonary vasculitis   - Recent TTE on 11/3/22 reviewed: normal LV. outpt card Dr. Ady Cooper  - outpt pulm Mack Tucker   - s/p thoracoscopic biopsy of mediastinal lymph node and Lung wedge resection 11/10/22  - recent pleural effusion s/p 650 cc U/S-guided rt thoracentesis 11/17/22  - exudative but no neutrophilic predominance and initial Gram stain w/o organisms  - cultures neg.   - path results favoring vasculitis: no evidence for lymphoma. Cytology also came back negative.   - comfortable on nasal canula, better post diuretic last admission.   - rheum and heme-onc following previously, will reconsult.   - last admission, she was not started on immunosuppressants such as cellcept given recent treatment for COVID19 at that time  - c/w prednisone 20 mg qdaily.   - RTX under consideration --> acute hepatitis panel (-) and quantiferon indeterminate  - ID started PCP ppx with Bactrim.     hx of Tachycardia    - cont low-dose metoprolol, 50 mg to 25 mg dose reduced in rehab.   - card consult (Dr. Hooker) in the past, if needed    Anxiety and depression  - stable, continue citalopram and Remeron.  - Pt denied SI/HI ideations, denied visual and auditory hallucinations.     GERD (gastroesophageal reflux disease)  - continue PPI    Hyperlipidemia.   - continue home ezetimibe. okay to hold if nonformulary     DVT ppx   - holding AC in the setting of anemia, pancytopenia.   - venodyne boots LEs

## 2023-02-01 NOTE — PROGRESS NOTE ADULT - SUBJECTIVE AND OBJECTIVE BOX
Date of Service: 23 @ 16:25    Patient is a 82y old  Female who presents with a chief complaint of weakness (2023 14:34)      Any change in ROS: feels very tired:  on 2 L :     MEDICATIONS  (STANDING):  budesonide 160 MICROgram(s)/formoterol 4.5 MICROgram(s) Inhaler 2 Puff(s) Inhalation two times a day  chlorhexidine 2% Cloths 1 Application(s) Topical daily  cholecalciferol 2000 Unit(s) Oral daily  citalopram 10 milliGRAM(s) Oral daily  fluticasone propionate 50 MICROgram(s)/spray Nasal Spray 1 Spray(s) Both Nostrils two times a day  folic acid 1 milliGRAM(s) Oral daily  guaiFENesin  milliGRAM(s) Oral every 12 hours  influenza  Vaccine (HIGH DOSE) 0.7 milliLiter(s) IntraMuscular once  ipratropium    for Nebulization 500 MICROGram(s) Nebulizer every 6 hours  meropenem  IVPB 1000 milliGRAM(s) IV Intermittent every 12 hours  metoprolol tartrate 25 milliGRAM(s) Oral two times a day  mirtazapine 7.5 milliGRAM(s) Oral daily  pantoprazole    Tablet 40 milliGRAM(s) Oral before breakfast  predniSONE   Tablet 20 milliGRAM(s) Oral daily  senna 1 Tablet(s) Oral daily  sodium bicarbonate 650 milliGRAM(s) Oral three times a day  trimethoprim  160 mG/sulfamethoxazole 800 mG 1 Tablet(s) Oral daily  vancomycin  IVPB 750 milliGRAM(s) IV Intermittent every 24 hours    MEDICATIONS  (PRN):  acetaminophen     Tablet .. 650 milliGRAM(s) Oral every 6 hours PRN Temp greater or equal to 38C (100.4F), Mild Pain (1 - 3)  aluminum hydroxide/magnesium hydroxide/simethicone Suspension 30 milliLiter(s) Oral every 4 hours PRN Dyspepsia  melatonin 3 milliGRAM(s) Oral at bedtime PRN Insomnia  ondansetron Injectable 4 milliGRAM(s) IV Push every 8 hours PRN Nausea and/or Vomiting  polyethylene glycol 3350 17 Gram(s) Oral daily PRN Constipation    Vital Signs Last 24 Hrs  T(C): 37.3 (2023 10:58), Max: 37.4 (2023 00:40)  T(F): 99.1 (2023 10:58), Max: 99.3 (2023 00:40)  HR: 95 (2023 10:58) (82 - 100)  BP: 106/66 (2023 10:58) (104/62 - 141/66)  BP(mean): --  RR: 18 (2023 10:58) (18 - 18)  SpO2: 98% (2023 10:58) (95% - 98%)    Parameters below as of 2023 10:58  Patient On (Oxygen Delivery Method): nasal cannula  O2 Flow (L/min): 2      I&O's Summary    2023 07:01  -  2023 07:00  --------------------------------------------------------  IN: 100 mL / OUT: 0 mL / NET: 100 mL    2023 07:01  -  2023 16:25  --------------------------------------------------------  IN: 100 mL / OUT: 0 mL / NET: 100 mL          Physical Exam:   GENERAL: cachectic  tired appearing   HEENT: RANJIT/   Atraumatic, Normocephalic  ENMT: No tonsillar erythema, exudates, or enlargement; Moist mucous membranes, Good dentition, No lesions  NECK: Supple, No JVD, Normal thyroid  CHEST/LUNG: Clear to auscultaion, ; No rales, rhonchi, wheezing, or rubs  CVS: Regular rate and rhythm; No murmurs, rubs, or gallops  GI: : Soft, Nontender, Nondistended; Bowel sounds present  NERVOUS SYSTEM:  Alert & Oriented X3  EXTREMITIES:  - edema  LYMPH: No lymphadenopathy noted  SKIN: No rashes or lesions  ENDOCRINOLOGY: No Thyromegaly  PSYCH: Appropriate    Labs:  21                            9.9    57.56 )-----------(        ( 2023 07:11 )             29.0                         10.2   64.59 )-----------(        ( 2023 01:06 )             30.1                         6.1    54.99 )-----------(        ( 2023 18:35 )             18.2                         5.9    73.44 )-----------(        ( 2023 10:44 )             18.1                         6.8    74.20 )-----------(        ( 2023 07:05 )             22.1                         7.1    73.31 )-----------(        ( 2023 12:20 )             21.9     02    136  |  102  |  30<H>  ----------------------------<  65<L>  3.8   |  21<L>  |  1.02      137  |  103  |  29<H>  ----------------------------<  94  4.1   |  21<L>  |  1.05      135  |  101  |  34<H>  ----------------------------<  126<H>  4.7   |  22  |  1.43<H>      134<L>  |  99  |  29<H>  ----------------------------<  48<LL>  4.9   |  19<L>  |  1.20      135  |  100  |  26<H>  ----------------------------<  131<H>  4.9   |  23  |  1.05    Ca    8.7      2023 07:11  Ca    8.9      2023 01:06  Ca    8.8      2023 10:44  Ca    9.1      2023 07:05  Phos  3.6     02-  Mg     1.8     02-    TPro  6.2  /  Alb  3.5  /  TBili  1.2  /  DBili  x   /  AST  39  /  ALT  8<L>  /  AlkPhos  57  -  TPro  6.0  /  Alb  3.4  /  TBili  0.6  /  DBili  x   /  AST  28  /  ALT  5<L>  /  AlkPhos  50    TPro  6.3  /  Alb  3.5  /  TBili  0.6  /  DBili  x   /  AST  27  /  ALT  5<L>  /  AlkPhos  57    TPro  6.3  /  Alb  3.5  /  TBili  0.5  /  DBili  x   /  AST  22  /  ALT  6<L>  /  AlkPhos  54      CAPILLARY BLOOD GLUCOSE      POCT Blood Glucose.: 145 mg/dL (2023 15:56)  POCT Blood Glucose.: 95 mg/dL (2023 00:50)      LIVER FUNCTIONS - ( 2023 01:06 )  Alb: 3.5 g/dL / Pro: 6.2 g/dL / ALK PHOS: 57 U/L / ALT: 8 U/L / AST: 39 U/L / GGT: x             Urinalysis Basic - ( 2023 09:57 )    Color: Yellow / Appearance: Slightly Turbid / S.033 / pH: x  Gluc: x / Ketone: Trace  / Bili: Negative / Urobili: Negative   Blood: x / Protein: 300 mg/dL / Nitrite: Negative   Leuk Esterase: Negative / RBC: 4 /hpf /  /HPF   Sq Epi: x / Non Sq Epi: 10 /hpf / Bacteria: Moderate            RECENT CULTURES:   @ 10:20 .Blood Blood-Peripheral   PCR    Growth in aerobic bottle: Gram positive cocci in pairs    Blood Culture PCR  Blood Culture PCR     Growth in aerobic bottle: Gram positive cocci in pairs  ***Blood Panel PCR results on this specimen are available  approximately 3 hours after the Gram stain result.***  Gram stain, PCR, and/or culture results may not always  correspond due to difference in methodologies.  ************************************************************  This PCR assay was performed by multiplex PCR. This  Assay tests for 66 bacterial and resistance gene targets.  Please refer to the [facility] test directory  at [email or web address] for details.     @ 12:01 .Blood Blood-Peripheral   PCR    Growth in aerobic bottle: Gram Positive Cocci in Clusters    Blood Culture PCR  Blood Culture PCR     Growth in aerobic bottle: Staphylococcus epidermidis  Susceptibility to follow.  ***Blood Panel PCR results on this specimen are available  approximately 3 hours after the Gram stain result.***  Gram stain, PCR, and/or culture results may not always  correspond due to difference in methodologies.  ************************************************************  This PCR assay was performed by multiplex PCR. This  Assay tests for 66 bacterial and resistance gene targets.  Please refer to the [facility] test directory  at [email or web address] for details.          RESPIRATORY CULTURES:      < from: Xray Chest 1 View- PORTABLE-Urgent (Xray Chest 1 View- PORTABLE-Urgent .) (23 @ 11:14) >    ACC: [id number] EXAM:  XR CHEST PORTABLE URGENT 1V   ORDERED BY: ALICIA GEORGE     PROCEDURE DATE:  2023          INTERPRETATION:  EXAMINATION: XR CHEST URGENT    CLINICAL INDICATION: r/o aspiration, confusion    TECHNIQUE: Single frontal,portable view of the chest was obtained.    COMPARISON: Chest x-ray 2023    FINDINGS:    LINES/TUBES: Left chest wall medication port with tip in the right atrium.    LUNGS/PLEURA: Increase in bilateral lower lung hazy opacity. Question   trace right effusion. No left pleural effusion. No pneumothorax.    HEART AND MEDIASTINUM: The heart size is not accurately measured in this   projection.    SKELETON: No acute osseous abnormalities.    IMPRESSION:    Bibasilar hazy opacification. This may represent atelectasis or pneumonia    --- End of Report ---          JAMIE DURAN MD; Resident Radiologist  This document has been electronically signed.  LOAN TODD MD; Attending Radiologist  This document has been electronically signed. 2023  4:03PM    < end of copied text >      Studies  Chest X-RAY  CT SCAN Chest   Venous Dopplers: LE:   CT Abdomen  Others

## 2023-02-01 NOTE — CONSULT NOTE ADULT - PROBLEM SELECTOR RECOMMENDATION 9
- Left epistaxis was controlled with 5.5cm Rapid Rhino with 6cc pressure.   - gram-positive abx coverage for duration of packing placement.   - Will remove packing on 2/4.   - Apply mittens to prevent packing removal.   - Nasal saline, 2 sprays to both nares 4 times a day.  - Strict blood pressure control.  - Avoid nasal trauma; no nose rubbing, blowing or manipulating nasal packing.   - Sneeze with mouth open and pinching nares.  - Avoid bending with head blow the waist.    - No heavy lifting.  - Maintain active T&S.  - Transfusion support prn.   - ENT will follow.   - Call with  questions.    # [phone]  ENT PA. - Left epistaxis was controlled with 5.5cm Rapid Rhino with 6cc pressure.   - gram-positive abx coverage for duration of packing placement.   - Will remove packing on 2/4.   - Apply mittens to prevent packing removal.   - Nasal saline, 2 sprays to both nares 4 times a day.  - Strict blood pressure control.  - Avoid nasal trauma; no nose rubbing, blowing or manipulating nasal packing.   - Sneeze with mouth open and pinching nares.  - Avoid bending with head blow the waist.    - No heavy lifting.  - Maintain active T&S.  - Transfusion support prn.   - ENT will follow.   - Call with  questions.    # [phone]  ENT PA

## 2023-02-01 NOTE — CONSULT NOTE ADULT - ASSESSMENT
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 Per team, pt was noted to be picking nose. Pt not on blood thinners. 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

## 2023-02-01 NOTE — PROGRESS NOTE ADULT - ASSESSMENT
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 She presented here from [facility] for elevated WBC and low hemoglobin, severe weakness. Pt states for the past 2-3 days has been having increased weakness and lethargy, decreased appetite. Reports chronic cough, currently nonproductive - RVP/COVID negative. Has multiple episodes of loose stools x weeks, reported recently trying marijuana for appetite and noted worsening. She was being treated empirically for c.diff with oral vancomycin but then became constipated, s/p bowel regimen and having normal bowel movements.   Patient initially being monitored off antibiotics given she was afebrile, WBC was stable and no infectious source was found. She had a fever 100.9F on 1/30 overnight with worsening leukocytosis and then 101.4F overnight 1/31 and noted with confusion. Repeat RVP/COVID negative.     Sepsis due to E. faecalis and Staph epi bacteremia  Unclear sources, possible skin or port as source for Staph epi  Rule out UTI, ?maybe gu source   H/o PCN allergy with hives   - AMS/confused, fever curve improved - afebrile x24h, WBC downtrending    - possible neurotoxicity vs infectious etiology for confusion --discontinued cefepime 1/31  - CXR - no new focal consolidation, unchanged nodular opacities, increased hazy opacities - possible atelectasis or layering effusion  - L chest port accessed without erythema or TTP - no sign of infection  - 1/30 Bcx (1 set sent) -- aerobic bottle grew Staph epi - MRSE  - 1/31 Bcx (1 set sent) - aerobic bottle grew E. 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  H/o DLBCL, EBV+   - Pulmonary, Rheumatology and Heme/Onc following   - H/H noted - Hb 6.8 today, transfusions for Hb<7   - noted patient had similar presentation with bicytopenia and leukocytosis in the past, may need tx with rituximab   - s/p BMBx 1/23 - found with myelodysplastic syndrome    - quantiferon indeterminate - pt on steroids which can cause indeterminate results    - CT - s/p wedge resections in b/l upper lobes, 2 cm nodular opacity a/w staple line in RUL; multiple ill-defined b/l opacities more in RLL -unclear etiology, b/l effusions R>L   - continue on Bactrim DS 1 tablet daily for PCP ppx while on prednisone    D/w Dr. Hugo Malave M.D.  [facility], Division of Infectious Diseases  [phone]  After 5pm on weekdays and all day on weekends - please call [phone]

## 2023-02-01 NOTE — PROGRESS NOTE ADULT - SUBJECTIVE AND OBJECTIVE BOX
[facility] DIVISION OF INFECTIOUS DISEASES  ANDREW Rodríguez Y. Patel, S. Shah, G. Casimir  [phone]  ([phone] - weekdays after 5pm and weekends)    Name: BETTIE NGUYEN  Age/Gender: 82y Female  MRN: [id number]    Interval History:  Patient seen and examined this morning.   Patient confused, only able to tell first name.  Denies pain, limited ROS.   Notes reviewed, events noted. Tm 99.3F.     Allergies: codeine (Nausea)  doxycycline (Nausea)  penicillin (Hives)    Objective:  Vitals:   T(F): 99.1 (23 @ 10:58), Max: 99.3 (23 @ 00:40)  HR: 95 (23 @ 10:58) (82 - 100)  BP: 106/66 (23 @ 10:58) (90/50 - 141/66)  RR: 18 (23 @ 10:58) (18 - 18)  SpO2: 98% (23 @ 10:58) (92% - 98%)  Physical Examination:  General: no acute distress, NC  HEENT: NC/AT, EOMI, anicteric, neck supple  Cardio: S1, S2 present, normal rate  Resp: decreased breath sounds b/l  Abd: soft, nontender, nondistended, + BS  Neuro: AAOxself, slow to answer/follow  Ext: no edema, no cyanosis, moves extremities  Skin: warm, dry, no visible rash, ecchymosis   Lines: L chest port accessed without erythema/TTP    Laboratory Studies:  CBC:                       9.9    57.56 )-----------(        ( 2023 07:11 )             29.0     WBC Trend:  57.56 23 @ 07:11  64.59 23 @ 01:06  54.99 23 @ 18:35  73.44 23 @ 10:44  74.20 23 @ 07:05  73.31 23 @ 12:20    CMP:     136  |  102  |  30<H>  ----------------------------<  65<L>  3.8   |  21<L>  |  1.02    Ca    8.7      2023 07:11  Phos  3.6       Mg     1.8         TPro  6.2  /  Alb  3.5  /  TBili  1.2  /  DBili  x   /  AST  39  /  ALT  8<L>  /  AlkPhos  57      Creatinine, Serum: 1.02 mg/dL (23 @ 07:11)  Creatinine, Serum: 1.05 mg/dL (23 @ 01:06)  Creatinine, Serum: 1.43 mg/dL (23 @ 10:44)  Creatinine, Serum: 1.20 mg/dL (23 @ 07:05)  Creatinine, Serum: 1.05 mg/dL (23 @ 12:20)    LIVER FUNCTIONS - ( 2023 01:06 )  Alb: 3.5 g/dL / Pro: 6.2 g/dL / ALK PHOS: 57 U/L / ALT: 8 U/L / AST: 39 U/L / GGT: x           Urinalysis Basic - ( 2023 09:57 )  Color: Yellow / Appearance: Slightly Turbid / S.033 / pH: x  Gluc: x / Ketone: Trace  / Bili: Negative / Urobili: Negative   Blood: x / Protein: 300 mg/dL / Nitrite: Negative   Leuk Esterase: Negative / RBC: 4 /hpf /  /HPF   Sq Epi: x / Non Sq Epi: 10 /hpf / Bacteria: Moderate    Microbiology: reviewed   Culture - Blood (collected 23 @ 10:20)  Source: .Blood Blood-Peripheral  Gram Stain (23 @ 06:53):    Growth in aerobic bottle: Gram positive cocci in pairs  Preliminary Report (23 @ 06:54):    Growth in aerobic bottle: Gram positive cocci in pairs    ***Blood Panel PCR results on this specimen are available    approximately 3 hours after the Gram stain result.***    Gram stain, PCR, and/or culture results may not always    correspond due to difference in methodologies.    ************************************************************    This PCR assay was performed by multiplex PCR. This    Assay tests for 66 bacterial and resistance gene targets.    Please refer to the [facility] test directory    at [email or web address] for details.  Organism: Blood Culture PCR (23 @ 08:58)  Organism: Blood Culture PCR (23 @ 08:58)      -  Enterococcus faecalis: Detec      -  Staphylococcus epidermidis, Methicillin resistant: Detec      Method Type: PCR    Culture - Blood (collected 23 @ 12:01)  Source: .Blood Blood-Peripheral  Gram Stain (23 @ 06:52):    Growth in aerobic bottle: Gram Positive Cocci in Clusters  Preliminary Report (23 @ 10:11):    Growth in aerobic bottle: Staphylococcus epidermidis    Susceptibility to follow.    ***Blood Panel PCR results on this specimen are available    approximately 3 hours after the Gram stain result.***    Gram stain, PCR, and/or culture results may not always    correspond due to difference in methodologies.    ************************************************************    This PCR assay was performed by multiplex PCR. This    Assay tests for 66 bacterial and resistance gene targets.    Please refer to the [facility] test directory    at [email or web address] for details.  Organism: Blood Culture PCR (23 @ 08:15)  Organism: Blood Culture PCR (23 @ 08:15)      -  Staphylococcus epidermidis, Methicillin resistant: Detec      Method Type: PCR    Culture - Blood (collected 23 @ 15:50)  Source: .Blood Blood-Peripheral  Final Report (23 @ 23:01):    No Growth Final    Culture - Blood (collected 23 @ 15:30)  Source: .Blood Blood-Peripheral  Final Report (23 @ 23:01):    No Growth Final    Respiratory Viral Panel with COVID-19 by DIANA (23 @ 07:31)    Rapid RVP Result: [facility]    SARS-CoV-2: [facility]    Radiology: reviewed   CT Head No Cont (23 @ 01:43) >IMPRESSION: No acute intracranial hemorrhage or acute territorial infarct.  If symptoms persist, follow-up MRI exam recommended. Preliminary report provided on 2023    Xray Chest 1 View- PORTABLE-Urgent (Xray Chest 1 View- PORTABLE-Urgent .) (23 @ 15:38) >IMPRESSION: Unchanged nodular opacities. Increased hazy opacities may represent atelectasis or layering effusion    Medications:  acetaminophen     Tablet .. 650 milliGRAM(s) Oral every 6 hours PRN  aluminum hydroxide/magnesium hydroxide/simethicone Suspension 30 milliLiter(s) Oral every 4 hours PRN  budesonide 160 MICROgram(s)/formoterol 4.5 MICROgram(s) Inhaler 2 Puff(s) Inhalation two times a day  chlorhexidine 2% Cloths 1 Application(s) Topical daily  cholecalciferol 2000 Unit(s) Oral daily  citalopram 10 milliGRAM(s) Oral daily  fluticasone propionate 50 MICROgram(s)/spray Nasal Spray 1 Spray(s) Both Nostrils two times a day  folic acid 1 milliGRAM(s) Oral daily  guaiFENesin  milliGRAM(s) Oral every 12 hours  influenza  Vaccine (HIGH DOSE) 0.7 milliLiter(s) IntraMuscular once  ipratropium    for Nebulization 500 MICROGram(s) Nebulizer every 6 hours  melatonin 3 milliGRAM(s) Oral at bedtime PRN  meropenem  IVPB 1000 milliGRAM(s) IV Intermittent every 12 hours  metoprolol tartrate 25 milliGRAM(s) Oral two times a day  mirtazapine 7.5 milliGRAM(s) Oral daily  ondansetron Injectable 4 milliGRAM(s) IV Push every 8 hours PRN  pantoprazole    Tablet 40 milliGRAM(s) Oral before breakfast  polyethylene glycol 3350 17 Gram(s) Oral daily PRN  predniSONE   Tablet 20 milliGRAM(s) Oral daily  senna 1 Tablet(s) Oral daily  sodium bicarbonate 650 milliGRAM(s) Oral three times a day  trimethoprim  160 mG/sulfamethoxazole 800 mG 1 Tablet(s) Oral daily  vancomycin  IVPB 750 milliGRAM(s) IV Intermittent every 24 hours    Current Antimicrobials:  meropenem  IVPB 1000 milliGRAM(s) IV Intermittent every 12 hours  trimethoprim  160 mG/sulfamethoxazole 800 mG 1 Tablet(s) Oral daily  vancomycin  IVPB 750 milliGRAM(s) IV Intermittent every 24 hours    Prior/Completed Antimicrobials:  cefepime   IVPB

## 2023-02-01 NOTE — GOALS OF CARE CONVERSATION - ADVANCED CARE PLANNING - CONVERSATION DETAILS
The son brought in pt's living will, which states no ventilator, no CPR, no feeding tube if irreversible disease.  Advance directives discussed at bedside with pt and the son Levon. Introduced the philosophy of palliative care: quality vs. quantity of life. Goals of care discussed. All questions answered.   Pt and the son wishes requests DNR/DNI. No feeding tube.   Okay to continue with abx, IVF and chemotherapy if appropriate.   MOIST form signed.  Advance care planning time: approximately 32 mins spent discussing goals of care, prognosis, diagnosis and treatment plan, and filling out the MOLST form.   d/w NP.

## 2023-02-01 NOTE — CHART NOTE - NSCHARTNOTEFT_GEN_A_CORE
Interval Hx: Informed by RN of critical lab value of CBC as below:               5.9    47.65 )-----------( 24       ( 01 Feb 2023 18:31 )             17.3         Vital Signs Last 24 Hrs  T(C): 36.5 (01 Feb 2023 20:57), Max: 37.4 (01 Feb 2023 00:40)  T(F): 97.7 (01 Feb 2023 20:57), Max: 99.3 (01 Feb 2023 00:40)  HR: 94 (01 Feb 2023 20:57) (86 - 107)  BP: 147/80 (01 Feb 2023 20:57) (90/46 - 147/80)  RR: 18 (01 Feb 2023 20:57) (18 - 18)  SpO2: 99% (01 Feb 2023 20:57) (90% - 100%)    Parameters below as of 01 Feb 2023 20:57  Patient On (Oxygen Delivery Method): mask, nonrebreather      Hgb downtrended from 9.9 from earlier today. Of note, patient had nosebleeds during the day today, which initially self resolved, but later restarted bleeding. At that time, ENT was consulted and nasal packing was placed to stop bleeding. Also of note, secondary to MDS, patient has required intermittent PRBC and platelet transfusions.    Patient seen and evaluated at bedside.  Patient is A+Ox3 Vitals as above. Patient was placed on 15L NRB during the day around the time of nosebleed because they were found to be hypoxic. Patient currently saturating 100% and does not appear to be in any respiratory distress. Would wean to humidified face tent with lower liter setting as able.   No current nosebleed noted on exam, and no other signs of bleeding currently.  Will transfuse 1 unit PRBC and patient pending 1 unit platelets as well. Will assess need for IV diuresis post transfusion.  Anemia likely related to MDS vs recent nosebleed.  Discussed above w/ Dr. Arias.   Will continue to closely assess and follow.    -Jacqueline Parisi PA-C, [id number], Dept of Medicine

## 2023-02-02 NOTE — PROGRESS NOTE ADULT - SUBJECTIVE AND OBJECTIVE BOX
INTERVAL HPI/OVERNIGHT EVENTS:  Patient S&E at bedside. As per note and lab, Hb 5.9 found yesterday afernoon lab and pt received one PRBC but post-transfusion Hb increased to 11.8. Likely lab error. Denied headache, dizziness, N/V/D, changes with urination and BMs.     VITAL SIGNS:  T(F): 96.6 (23 @ 11:54)  HR: 85 (23 @ 11:54)  BP: 146/84 (23 @ 11:54)  RR: 18 (23 @ 11:54)  SpO2: 97% (23 @ 11:54)  Wt(kg): --    PHYSICAL EXAM:    Constitutional: NAD  Eyes: EOMI, sclera non-icteric  Neck: supple  Respiratory: CTAB, no wheezes or crackles   Cardiovascular: RRR  Gastrointestinal: soft, NTND, + BS  Extremities: no cyanosis, clubbing or edema   Neurological: awake and alert      MEDICATIONS  (STANDING):  budesonide 160 MICROgram(s)/formoterol 4.5 MICROgram(s) Inhaler 2 Puff(s) Inhalation two times a day  chlorhexidine 2% Cloths 1 Application(s) Topical daily  cholecalciferol 2000 Unit(s) Oral daily  citalopram 10 milliGRAM(s) Oral daily  fluticasone propionate 50 MICROgram(s)/spray Nasal Spray 1 Spray(s) Both Nostrils two times a day  folic acid 1 milliGRAM(s) Oral daily  guaiFENesin  milliGRAM(s) Oral every 12 hours  influenza  Vaccine (HIGH DOSE) 0.7 milliLiter(s) IntraMuscular once  ipratropium    for Nebulization 500 MICROGram(s) Nebulizer every 6 hours  meropenem  IVPB 1000 milliGRAM(s) IV Intermittent every 12 hours  metoprolol tartrate 25 milliGRAM(s) Oral two times a day  mirtazapine 7.5 milliGRAM(s) Oral daily  pantoprazole    Tablet 40 milliGRAM(s) Oral before breakfast  predniSONE   Tablet 20 milliGRAM(s) Oral daily  senna 1 Tablet(s) Oral daily  sodium bicarbonate 650 milliGRAM(s) Oral three times a day  trimethoprim  160 mG/sulfamethoxazole 800 mG 1 Tablet(s) Oral daily  vancomycin  IVPB 750 milliGRAM(s) IV Intermittent every 24 hours    MEDICATIONS  (PRN):  acetaminophen     Tablet .. 650 milliGRAM(s) Oral every 6 hours PRN Temp greater or equal to 38C (100.4F), Mild Pain (1 - 3)  aluminum hydroxide/magnesium hydroxide/simethicone Suspension 30 milliLiter(s) Oral every 4 hours PRN Dyspepsia  melatonin 3 milliGRAM(s) Oral at bedtime PRN Insomnia  ondansetron Injectable 4 milliGRAM(s) IV Push every 8 hours PRN Nausea and/or Vomiting  polyethylene glycol 3350 17 Gram(s) Oral daily PRN Constipation      Allergies    codeine (Nausea)  doxycycline (Nausea)  penicillin (Hives)    Intolerances        LABS:                        11.8   55.63 )-----------( 18       ( 2023 02:35 )             33.7     02-    136  |  102  |  30<H>  ----------------------------<  65<L>  3.8   |  21<L>  |  1.02    Ca    8.7      2023 07:11  Phos  3.6     02-  Mg     1.8     02-    TPro  6.2  /  Alb  3.5  /  TBili  1.2  /  DBili  x   /  AST  39  /  ALT  8<L>  /  AlkPhos  57  02-      Urinalysis Basic - ( 2023 09:57 )    Color: Yellow / Appearance: Slightly Turbid / S.033 / pH: x  Gluc: x / Ketone: Trace  / Bili: Negative / Urobili: Negative   Blood: x / Protein: 300 mg/dL / Nitrite: Negative   Leuk Esterase: Negative / RBC: 4 /hpf /  /HPF   Sq Epi: x / Non Sq Epi: 10 /hpf / Bacteria: Moderate        RADIOLOGY & ADDITIONAL TESTS:  Studies reviewed.

## 2023-02-02 NOTE — PROGRESS NOTE ADULT - SUBJECTIVE AND OBJECTIVE BOX
[facility]  ANDREW Rodríguze Y. Patel, S. Shah, G. Bebeto  [phone]  ([phone] - weekdays after 5pm and weekends)    Name: BETTIE NGUYEN  Age/Gender: 82y Female  MRN: [id number]    Interval History:  Patient seen and examined this morning.   Patient more awake, orientedx2-3, on NRBM  States she feels fine, denies pain, n/v.   Notes reviewed. Seen by ENT for L epistaxis.   Afebrile.     Allergies: codeine (Nausea)  doxycycline (Nausea)  penicillin (Hives)    Objective:  Vitals:   T(F): 97.3 (23 @ 04:35), Max: 98.9 (23 @ 11:30)  HR: 84 (23 @ 04:35) (84 - 107)  BP: 145/75 (23 @ 04:35) (90/46 - 147/80)  RR: 18 (23 @ 04:35) (18 - 18)  SpO2: 96% (23 @ 04:35) (90% - 100%)  Physical Examination:  General: no acute distress, NRBM  HEENT: NC/AT, EOMI, anicteric, neck supple  Cardio: S1, S2 present, normal rate  Resp: decreased breath sounds b/l  Abd: soft, nontender, nondistended, + BS  Neuro: AAOx2-3, no obvious focal deficits  Ext: no edema, no cyanosis, moving extremities  Skin: warm, dry, no visible rash, ecchymosis  Lines: L chest port accessed, no erythema/TTP    Laboratory Studies:  CBC:                       11.8   55.63 )-----------( 18       ( 2023 02:35 )             33.7     WBC Trend:  55.63 23 @ 02:35  47.65 23 @ 18:31  57.56 23 @ 07:11  64.59 23 @ 01:06  54.99 23 @ 18:35  73.44 23 @ 10:44  74.20 23 @ 07:05  73.31 23 @ 12:20    CMP:     136  |  102  |  30<H>  ----------------------------<  65<L>  3.8   |  21<L>  |  1.02    Ca    8.7      2023 07:11  Phos  3.6       Mg     1.8         TPro  6.2  /  Alb  3.5  /  TBili  1.2  /  DBili  x   /  AST  39  /  ALT  8<L>  /  AlkPhos  57      Creatinine, Serum: 1.02 mg/dL (23 @ 07:11)  Creatinine, Serum: 1.05 mg/dL (23 @ 01:06)  Creatinine, Serum: 1.43 mg/dL (23 @ 10:44)  Creatinine, Serum: 1.20 mg/dL (23 @ 07:05)  Creatinine, Serum: 1.05 mg/dL (23 @ 12:20)    LIVER FUNCTIONS - ( 2023 01:06 )  Alb: 3.5 g/dL / Pro: 6.2 g/dL / ALK PHOS: 57 U/L / ALT: 8 U/L / AST: 39 U/L / GGT: x           Urinalysis Basic - ( 2023 09:57 )  Color: Yellow / Appearance: Slightly Turbid / S.033 / pH: x  Gluc: x / Ketone: Trace  / Bili: Negative / Urobili: Negative   Blood: x / Protein: 300 mg/dL / Nitrite: Negative   Leuk Esterase: Negative / RBC: 4 /hpf /  /HPF   Sq Epi: x / Non Sq Epi: 10 /hpf / Bacteria: Moderate    Microbiology: reviewed   Culture - Blood (collected 23 @ 10:20)  Source: .Blood Blood-Peripheral  Gram Stain (23 @ 06:53):    Growth in aerobic bottle: Gram positive cocci in pairs  Preliminary Report (23 @ 06:54):    Growth in aerobic bottle: Gram positive cocci in pairs    ***Blood Panel PCR results on this specimen are available    approximately 3 hours after the Gram stain result.***    Gram stain, PCR, and/or culture results may not always    correspond due to difference in methodologies.    ************************************************************    This PCR assay was performed by multiplex PCR. This    Assay tests for 66 bacterial and resistance gene targets.    Please refer to the [facility] test directory    at [email or web address] for details.  Organism: Blood Culture PCR (23 @ 08:58)  Organism: Blood Culture PCR (23 @ 08:58)      -  Enterococcus faecalis: Detec      -  Staphylococcus epidermidis, Methicillin resistant: Detec      Method Type: PCR    Culture - Blood (collected 23 @ 12:01)  Source: .Blood Blood-Peripheral  Gram Stain (23 @ 22:58):    Growth in aerobic bottle: Gram Positive Cocci in Clusters    Growth in anaerobic bottle: Gram Positive Cocci in Clusters  Final Report (23 @ 10:29):    Growth in aerobic and anaerobic bottles: Staphylococcus epidermidis    ***Blood Panel PCR results on this specimen are available    approximately 3 hours after the Gram stain result.***    Gram stain, PCR, and/or culture results may not always    correspond due to difference in methodologies.    ************************************************************    This PCR assay was performed by multiplex PCR. This    Assay tests for 66 bacterial and resistance gene targets.    Please refer to the [facility] test directory    at [email or web address] for details.  Organism: Blood Culture PCR  Staphylococcus epidermidis (23 @ 10:29)  Organism: Staphylococcus epidermidis (23 @ 10:)      -  Ampicillin/Sulbactam: R 16/8      -  Cefazolin: R >16      -  Clindamycin: R >4      -  Erythromycin: R >4      -  Gentamicin: S <=1 Should not be used as monotherapy      -  Oxacillin: R >2      -  Penicillin: R >8      -  Rifampin: S <=1 Should not be used as monotherapy      -  Tetracycline: S <=1      -  Trimethoprim/Sulfamethoxazole: R >      -  Vancomycin: S 1      Method Type: JIM  Organism: Blood Culture PCR (23 @ 10:29)      -  Staphylococcus epidermidis, Methicillin resistant: Detec      Method Type: PCR    Radiology: reviewed   Xray Chest 1 View- PORTABLE-Urgent (Xray Chest 1 View- PORTABLE-Urgent .) (23 @ 11:14) >IMPRESSION: Bibasilar hazy opacification. This may represent atelectasis or pneumonia    CT Head No Cont (23 @ 01:43) >IMPRESSION: No acute intracranial hemorrhage or acute territorial infarct.  If symptoms persist, follow-up MRI exam recommended. Preliminary report provided on 2023    Medications:  acetaminophen     Tablet .. 650 milliGRAM(s) Oral every 6 hours PRN  aluminum hydroxide/magnesium hydroxide/simethicone Suspension 30 milliLiter(s) Oral every 4 hours PRN  budesonide 160 MICROgram(s)/formoterol 4.5 MICROgram(s) Inhaler 2 Puff(s) Inhalation two times a day  chlorhexidine 2% Cloths 1 Application(s) Topical daily  cholecalciferol 2000 Unit(s) Oral daily  citalopram 10 milliGRAM(s) Oral daily  fluticasone propionate 50 MICROgram(s)/spray Nasal Spray 1 Spray(s) Both Nostrils two times a day  folic acid 1 milliGRAM(s) Oral daily  guaiFENesin  milliGRAM(s) Oral every 12 hours  influenza  Vaccine (HIGH DOSE) 0.7 milliLiter(s) IntraMuscular once  ipratropium    for Nebulization 500 MICROGram(s) Nebulizer every 6 hours  melatonin 3 milliGRAM(s) Oral at bedtime PRN  meropenem  IVPB 1000 milliGRAM(s) IV Intermittent every 12 hours  metoprolol tartrate 25 milliGRAM(s) Oral two times a day  mirtazapine 7.5 milliGRAM(s) Oral daily  ondansetron Injectable 4 milliGRAM(s) IV Push every 8 hours PRN  pantoprazole    Tablet 40 milliGRAM(s) Oral before breakfast  polyethylene glycol 3350 17 Gram(s) Oral daily PRN  predniSONE   Tablet 20 milliGRAM(s) Oral daily  senna 1 Tablet(s) Oral daily  sodium bicarbonate 650 milliGRAM(s) Oral three times a day  trimethoprim  160 mG/sulfamethoxazole 800 mG 1 Tablet(s) Oral daily  vancomycin  IVPB 750 milliGRAM(s) IV Intermittent every 24 hours    Current Antimicrobials:  meropenem  IVPB 1000 milliGRAM(s) IV Intermittent every 12 hours  trimethoprim  160 mG/sulfamethoxazole 800 mG 1 Tablet(s) Oral daily  vancomycin  IVPB 750 milliGRAM(s) IV Intermittent every 24 hours    Prior/Completed Antimicrobials:  cefepime   IVPB

## 2023-02-02 NOTE — PROGRESS NOTE ADULT - ASSESSMENT
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 She presented here from [facility] for elevated WBC and low hemoglobin, severe weakness. Pt states for the past 2-3 days has been having increased weakness and lethargy, decreased appetite. +sputum productive cough. + cui, no sob, no difficulty breathing. No abdominal pain, nausea, vomiting, no bloody stools. Has endorsed multiple episodes of loose stools x weeks, currently being treated for c.diff with oral vancomycin.     Fatigue/dyspnea: due to severe anemia  - 2' MDS  - transfuse to keep Hgb above 7.0  - no melena, no hematochezia. no BRBPR. occult stool neg.   - she tends to require IV Lasix intermittently post transfusion.  - doubt GI bleed   - Physical therapy. Out of bed to chair with assistance.     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  - s/p Vanco PO a few days (started in rehab)  - diarrhea completely resolved.   - monitor off PO vanco per ID  - now constipated. PRN bowel regimen started. +BM    Fever, not neutropenic, starting 1/30/23 @ 5:05 AM  - RVP 1/30/23 (-)  - monitor blood cxs 1/30/23  - started empirically on cefepime 1/30/23, switched to merrem 1/31/23  - enterrocus bacteremia, abx per ID.     AMS  - unclear etiology, likely metabolic encephalopathy from ?Cefepime? received 3 doses, last dose 1/31/23  - CT head neg. sugars stable  - vanco IV added per ID.   - close monitoring   - monitor glucose (glucose 65 on BMP, but 95 on fingersticks)  - encourage PO intake     Pulmonary vasculitis   - Recent TTE on 11/3/22 reviewed: normal LV. outpt card Dr. Ady Cooper  - outpt pulm Mack Tucker   - s/p thoracoscopic biopsy of mediastinal lymph node and Lung wedge resection 11/10/22  - recent pleural effusion s/p 650 cc U/S-guided rt thoracentesis 11/17/22  - exudative but no neutrophilic predominance and initial Gram stain w/o organisms  - cultures neg.   - path results favoring vasculitis: no evidence for lymphoma. Cytology also came back negative.   - comfortable on nasal canula, better post diuretic last admission.   - rheum and heme-onc following previously, will reconsult.   - last admission, she was not started on immunosuppressants such as cellcept given recent treatment for COVID19 at that time  - c/w prednisone 20 mg qdaily.   - RTX under consideration --> acute hepatitis panel (-) and quantiferon indeterminate  - ID started PCP ppx with Bactrim.     hx of Tachycardia    - cont low-dose metoprolol, 50 mg to 25 mg dose reduced in rehab.   - card consult (Dr. Hooker) in the past, if needed    Anxiety and depression  - stable, continue citalopram and Remeron.  - Pt denied SI/HI ideations, denied visual and auditory hallucinations.     GERD (gastroesophageal reflux disease)  - continue PPI    Hyperlipidemia.   - continue home ezetimibe. okay to hold if nonformulary     DVT ppx   - holding AC in the setting of anemia, pancytopenia.   - venodyne boots LEs

## 2023-02-02 NOTE — PROGRESS NOTE ADULT - ASSESSMENT
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  Pt not on blood thinners. Pt with severe Anemia 2/2 MDS and low platelets.

## 2023-02-02 NOTE — PROGRESS NOTE ADULT - SUBJECTIVE AND OBJECTIVE BOX
Date of Service: 23 @ 15:19    Patient is a 82y old  Female who presents with a chief complaint of weakness (2023 14:36)      Any change in ROS: event nsoted:  nostril is packed:  left one secondary to epistaxis:  currently back  4  of oxygen      MEDICATIONS  (STANDING):  budesonide 160 MICROgram(s)/formoterol 4.5 MICROgram(s) Inhaler 2 Puff(s) Inhalation two times a day  chlorhexidine 2% Cloths 1 Application(s) Topical daily  cholecalciferol 2000 Unit(s) Oral daily  citalopram 10 milliGRAM(s) Oral daily  fluticasone propionate 50 MICROgram(s)/spray Nasal Spray 1 Spray(s) Both Nostrils two times a day  folic acid 1 milliGRAM(s) Oral daily  furosemide   Injectable 20 milliGRAM(s) IV Push daily  guaiFENesin  milliGRAM(s) Oral every 12 hours  influenza  Vaccine (HIGH DOSE) 0.7 milliLiter(s) IntraMuscular once  ipratropium    for Nebulization 500 MICROGram(s) Nebulizer every 6 hours  meropenem  IVPB 1000 milliGRAM(s) IV Intermittent every 12 hours  metoprolol tartrate 25 milliGRAM(s) Oral two times a day  mirtazapine 7.5 milliGRAM(s) Oral daily  pantoprazole    Tablet 40 milliGRAM(s) Oral before breakfast  predniSONE   Tablet 20 milliGRAM(s) Oral daily  senna 1 Tablet(s) Oral daily  sodium bicarbonate 650 milliGRAM(s) Oral three times a day  trimethoprim  160 mG/sulfamethoxazole 800 mG 1 Tablet(s) Oral daily  vancomycin  IVPB 750 milliGRAM(s) IV Intermittent every 24 hours    MEDICATIONS  (PRN):  acetaminophen     Tablet .. 650 milliGRAM(s) Oral every 6 hours PRN Temp greater or equal to 38C (100.4F), Mild Pain (1 - 3)  aluminum hydroxide/magnesium hydroxide/simethicone Suspension 30 milliLiter(s) Oral every 4 hours PRN Dyspepsia  melatonin 3 milliGRAM(s) Oral at bedtime PRN Insomnia  ondansetron Injectable 4 milliGRAM(s) IV Push every 8 hours PRN Nausea and/or Vomiting  polyethylene glycol 3350 17 Gram(s) Oral daily PRN Constipation    Vital Signs Last 24 Hrs  T(C): 35.9 (2023 11:54), Max: 36.9 (2023 16:47)  T(F): 96.6 (2023 11:54), Max: 98.4 (2023 16:47)  HR: 85 (2023 11:54) (84 - 107)  BP: 146/84 (2023 11:54) (121/61 - 147/80)  BP(mean): --  RR: 18 (2023 11:54) (18 - 18)  SpO2: 97% (:54) (90% - 100%)    Parameters below as of 2023 11:54  Patient On (Oxygen Delivery Method): mask, nonrebreather        I&O's Summary    2023 07:01  -  2023 07:00  --------------------------------------------------------  IN: 100 mL / OUT: 0 mL / NET: 100 mL          Physical Exam:   GENERAL: weak , cachectic , tired:    HEENT: RANJIT/   Atraumatic, Normocephalic  ENMT: No tonsillar erythema, exudates, or enlargement; Moist mucous membranes, Good dentition, No lesions  NECK: Supple, No JVD, Normal thyroid  CHEST/LUNG: scattered cracekls+  CVS: Regular rate and rhythm; No murmurs, rubs, or gallops  GI: : Soft, Nontender, Nondistended; Bowel sounds present  NERVOUS SYSTEM:  Alert & Oriented X3  EXTREMITIES:  +edema  LYMPH: No lymphadenopathy noted  SKIN: No rashes or lesions  ENDOCRINOLOGY: No Thyromegaly  PSYCH: Appropriate    Labs:  21                            11.8   55.63 )-----------( 18       ( 2023 02:35 )             33.7                         5.9    47.65 )-----------( 24       ( 2023 18:31 )             17.3                         9.9    57.56 )-----------( 12       ( 2023 07:11 )             29.0                         10.2   64.59 )-----------(        ( 2023 01:06 )             30.1                         6.1    54.99 )-----------(        ( 2023 18:35 )             18.2                         5.9    73.44 )-----------(        ( 2023 10:44 )             18.1                         6.8    74.20 )-----------(        ( 2023 07:05 )             22.1                         7.1    73.31 )-----------(        ( 2023 12:20 )             21.9     02    136  |  102  |  30<H>  ----------------------------<  65<L>  3.8   |  21<L>  |  1.02  -    137  |  103  |  29<H>  ----------------------------<  94  4.1   |  21<L>  |  1.05  -    135  |  101  |  34<H>  ----------------------------<  126<H>  4.7   |  22  |  1.43<H>      134<L>  |  99  |  29<H>  ----------------------------<  48<LL>  4.9   |  19<L>  |  1.20  -    135  |  100  |  26<H>  ----------------------------<  131<H>  4.9   |  23  |  1.05    Ca    8.7      2023 07:11  Ca    8.9      2023 01:06  Phos  3.6     02-01  Mg     1.8     02-    TPro  6.2  /  Alb  3.5  /  TBili  1.2  /  DBili  x   /  AST  39  /  ALT  8<L>  /  AlkPhos  57    TPro  6.0  /  Alb  3.4  /  TBili  0.6  /  DBili  x   /  AST  28  /  ALT  5<L>  /  AlkPhos  50    TPro  6.3  /  Alb  3.5  /  TBili  0.6  /  DBili  x   /  AST  27  /  ALT  5<L>  /  AlkPhos  57    TPro  6.3  /  Alb  3.5  /  TBili  0.5  /  DBili  x   /  AST  22  /  ALT  6<L>  /  AlkPhos  54      CAPILLARY BLOOD GLUCOSE      POCT Blood Glucose.: 145 mg/dL (2023 15:56)      LIVER FUNCTIONS - ( 2023 01:06 )  Alb: 3.5 g/dL / Pro: 6.2 g/dL / ALK PHOS: 57 U/L / ALT: 8 U/L / AST: 39 U/L / GGT: x             Urinalysis Basic - ( 2023 09:57 )    Color: Yellow / Appearance: Slightly Turbid / S.033 / pH: x  Gluc: x / Ketone: Trace  / Bili: Negative / Urobili: Negative   Blood: x / Protein: 300 mg/dL / Nitrite: Negative   Leuk Esterase: Negative / RBC: 4 /hpf /  /HPF   Sq Epi: x / Non Sq Epi: 10 /hpf / Bacteria: Moderate            RECENT CULTURES:   @ 09:35 .Blood Blood-Peripheral         < from: Xray Chest 1 View- PORTABLE-Urgent (Xray Chest 1 View- PORTABLE-Urgent .) (23 @ 11:14) >    INTERPRETATION:  EXAMINATION: XR CHEST URGENT    CLINICAL INDICATION: r/o aspiration, confusion    TECHNIQUE: Single frontal,portable view of the chest was obtained.    COMPARISON: Chest x-ray 2023    FINDINGS:    LINES/TUBES: Left chest wall medication port with tip in the right atrium.    LUNGS/PLEURA: Increase in bilateral lower lung hazy opacity. Question   trace right effusion. No left pleural effusion. No pneumothorax.    HEART AND MEDIASTINUM: The heart size is not accurately measured in this   projection.    SKELETON: No acute osseous abnormalities.    IMPRESSION:    Bibasilar hazy opacification. This may represent atelectasis or pneumonia    --- End of Report ---          JAMIE DURAN MD; Resident Radiologist  This document has been electronically signed.  LOAN TODD MD; Attending Radiologist  This document has been electronically signed. 2023  4:03PM    < end of copied text >         No growth to date.     @ 09:20 .Blood Blood-Peripheral                No growth to date.     @ 10:20 .Blood Blood-Peripheral   PCR    Growth in aerobic bottle: Gram positive cocci in pairs    Blood Culture PCR  Blood Culture PCR     Growth in aerobic bottle: Enterococcus faecalis  ***Blood Panel PCR results on this specimen are available  approximately 3 hours after the Gram stain result.***  Gram stain, PCR, and/or culture results may not always  correspond due to difference in methodologies.  ************************************************************  This PCR assay was performed by multiplex PCR. This  Assay tests for 66 bacterial and resistance gene targets.  Please refer to the [email or web address] test directory  at [email or web address] for details.     @ 12:01 .Blood Blood-Peripheral   PCR    Growth in aerobic bottle: Gram Positive Cocci in Clusters  Growth in anaerobic bottle: Gram Positive Cocci in Clusters    Blood Culture PCR  Staphylococcus epidermidis  Blood Culture PCR     Growth in aerobic and anaerobic bottles: Staphylococcus epidermidis  ***Blood Panel PCR results on this specimen are available  approximately 3 hours after the Gram stain result.***  Gram stain, PCR, and/or culture results may not always  correspond due to difference in methodologies.  ************************************************************  This PCR assay was performed by multiplex PCR. This  Assay tests for 66 bacterial and resistance gene targets.  Please refer to the [facility] test directory  at [email or web address] for details.          RESPIRATORY CULTURES:          Studies  Chest X-RAY  CT SCAN Chest   Venous Dopplers: LE:   CT Abdomen  Others

## 2023-02-02 NOTE — PROGRESS NOTE ADULT - SUBJECTIVE AND OBJECTIVE BOX
CC: Left Epistaxis.    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 She presented here from [facility] for elevated WBC and low hemoglobin, severe weakness. . Has endorsed multiple episodes of loose stools x weeks. Currently being treated for c.diff with oral vancomycin.  .ENT was consulted for evaluation of Left Epistaxis controlled with RR, no further bleeding.  Pt with severe Anemia 2/2 MDS and low platelets.  No COVID swabs on the affected side, constipation, recent URI, recent procedures.. Pt denies chills, N/V, cough, sore throat, HA/ Blurry vision, CP,  PND, recent travel/sick contacts.       PAST MEDICAL & SURGICAL HISTORY:  ANCA-associated vasculitis      Anxiety and depression      Pulmonary embolism  2021      DVT, lower extremity  2021      GERD (gastroesophageal reflux disease)      Hyperlipidemia      Lung mass  s/p left wedge resection      DLBCL (diffuse large B cell lymphoma)      History of appendectomy      Lung nodule  pt had wedge resection in 2021      Non-Hodgkins lymphoma  chemoport inserted in 2021        Allergies    codeine (Nausea)  doxycycline (Nausea)  penicillin (Hives)    Intolerances      MEDICATIONS  (STANDING):  acetaminophen     Tablet .. 650 milliGRAM(s) Oral once  budesonide 160 MICROgram(s)/formoterol 4.5 MICROgram(s) Inhaler 2 Puff(s) Inhalation two times a day  chlorhexidine 2% Cloths 1 Application(s) Topical daily  cholecalciferol 2000 Unit(s) Oral daily  citalopram 10 milliGRAM(s) Oral daily  fluticasone propionate 50 MICROgram(s)/spray Nasal Spray 1 Spray(s) Both Nostrils two times a day  folic acid 1 milliGRAM(s) Oral daily  guaiFENesin  milliGRAM(s) Oral every 12 hours  influenza  Vaccine (HIGH DOSE) 0.7 milliLiter(s) IntraMuscular once  ipratropium    for Nebulization 500 MICROGram(s) Nebulizer every 6 hours  meropenem  IVPB 1000 milliGRAM(s) IV Intermittent every 12 hours  metoprolol tartrate 25 milliGRAM(s) Oral two times a day  mirtazapine 7.5 milliGRAM(s) Oral daily  pantoprazole    Tablet 40 milliGRAM(s) Oral before breakfast  predniSONE   Tablet 20 milliGRAM(s) Oral daily  senna 1 Tablet(s) Oral daily  sodium bicarbonate 650 milliGRAM(s) Oral three times a day  trimethoprim  160 mG/sulfamethoxazole 800 mG 1 Tablet(s) Oral daily  vancomycin  IVPB 750 milliGRAM(s) IV Intermittent every 24 hours    MEDICATIONS  (PRN):  acetaminophen     Tablet .. 650 milliGRAM(s) Oral every 6 hours PRN Temp greater or equal to 38C (100.4F), Mild Pain (1 - 3)  aluminum hydroxide/magnesium hydroxide/simethicone Suspension 30 milliLiter(s) Oral every 4 hours PRN Dyspepsia  melatonin 3 milliGRAM(s) Oral at bedtime PRN Insomnia  ondansetron Injectable 4 milliGRAM(s) IV Push every 8 hours PRN Nausea and/or Vomiting  polyethylene glycol 3350 17 Gram(s) Oral daily PRN Constipation      social history: see consult     family history: see consult     ROS:   ENT: all negative except as noted in HPI   Pulm: denies SOB, cough, hemoptysis  Neuro: denies numbness/tingling, loss of sensation  Endo: denies heat/cold intolerance, excessive sweating      Vital Signs Last 24 Hrs  T(C): 36.3 (02 Feb 2023 04:35), Max: 37.3 (01 Feb 2023 10:58)  T(F): 97.3 (02 Feb 2023 04:35), Max: 99.1 (01 Feb 2023 10:58)  HR: 84 (02 Feb 2023 04:35) (84 - 107)  BP: 145/75 (02 Feb 2023 04:35) (90/46 - 147/80)  BP(mean): --  RR: 18 (02 Feb 2023 04:35) (18 - 18)  SpO2: 96% (02 Feb 2023 04:35) (90% - 100%)    Parameters below as of 02 Feb 2023 04:35  Patient On (Oxygen Delivery Method): mask, nonrebreather                              11.8   55.63 )-----------( 18       ( 02 Feb 2023 02:35 )             33.7    02-01    136  |  102  |  30<H>  ----------------------------<  65<L>  3.8   |  21<L>  |  1.02    Ca    8.7      01 Feb 2023 07:11  Phos  3.6     02-01  Mg     1.8     02-01    TPro  6.2  /  Alb  3.5  /  TBili  1.2  /  DBili  x   /  AST  39  /  ALT  8<L>  /  AlkPhos  57  02-01       PHYSICAL EXAM:  Gen: NAD, On NRB.  Skin: No rashes, bruises, or lesions.  Head: Normocephalic, Atraumatic.  Face: no edema, erythema, or fluctuance. Parotid glands soft without mass.  Eyes: no scleral injection  Nose: Left Nare: Blood and blood clots suctioned out,  no aberrant vessels noted for cauterization. Packed with 5.5cm Rapid Rhino with 6cc pressure.            Right Nare: No bleeding noted.   Mouth: No Stridor / Drooling / Trismus.  Mucosa moist, tongue/uvula midline, oozing in posterior OP controlled after packing placement.    Neck: Flat, supple, no lymphadenopathy, trachea midline, no masses  Lymphatic: No lymphadenopathy  Resp: breathing easily, no stridor  CV: no peripheral edema/cyanosis  GI: nondistended   Peripheral vascular: no JVD or edema  Neuro: facial nerve intact, no facial droop.

## 2023-02-02 NOTE — CONSULT NOTE ADULT - SUBJECTIVE AND OBJECTIVE BOX
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 She presented here from [facility] for elevated WBC and low hemoglobin, severe weakness. Pt states for the past 2-3 days has been having increased weakness and lethargy, decreased appetite. +sputum productive cough. + cui, no sob, no difficulty breathing. No abdominal pain, nausea, vomiting, no bloody stools. Has endorsed multiple episodes of loose stools x weeks, currently being treated for c.diff with oral vancomycin.  (2023 21:52)           *****PAST MEDICAL / Surgical  HISTORY:  PAST MEDICAL & SURGICAL HISTORY:  ANCA-associated vasculitis      Anxiety and depression      Pulmonary embolism        DVT, lower extremity        GERD (gastroesophageal reflux disease)      Hyperlipidemia      Lung mass  s/p left wedge resection      DLBCL (diffuse large B cell lymphoma)      History of appendectomy      Lung nodule  pt had wedge resection in       Non-Hodgkins lymphoma  chemoport inserted in                *****FAMILY HISTORY:  FAMILY HISTORY:  FH: lung cancer (Sibling)    FH: CAD (coronary artery disease) (Sibling)             *****SOCIAL HISTORY:  Alcohol: None  Smoking: None         *****ALLERGIES:   Allergies    codeine (Nausea)  doxycycline (Nausea)  penicillin (Hives)    Intolerances             *****MEDICATIONS: current medication reviewed and documented.   MEDICATIONS  (STANDING):  budesonide 160 MICROgram(s)/formoterol 4.5 MICROgram(s) Inhaler 2 Puff(s) Inhalation two times a day  chlorhexidine 2% Cloths 1 Application(s) Topical daily  cholecalciferol 2000 Unit(s) Oral daily  citalopram 10 milliGRAM(s) Oral daily  fluticasone propionate 50 MICROgram(s)/spray Nasal Spray 1 Spray(s) Both Nostrils two times a day  folic acid 1 milliGRAM(s) Oral daily  furosemide   Injectable 20 milliGRAM(s) IV Push daily  guaiFENesin  milliGRAM(s) Oral every 12 hours  influenza  Vaccine (HIGH DOSE) 0.7 milliLiter(s) IntraMuscular once  ipratropium    for Nebulization 500 MICROGram(s) Nebulizer every 6 hours  magnesium sulfate  IVPB 1 Gram(s) IV Intermittent once  meropenem  IVPB 1000 milliGRAM(s) IV Intermittent every 12 hours  metoprolol tartrate 25 milliGRAM(s) Oral two times a day  mirtazapine 7.5 milliGRAM(s) Oral daily  pantoprazole    Tablet 40 milliGRAM(s) Oral before breakfast  potassium chloride    Tablet ER 20 milliEquivalent(s) Oral once  predniSONE   Tablet 20 milliGRAM(s) Oral daily  senna 1 Tablet(s) Oral daily  sodium bicarbonate 650 milliGRAM(s) Oral three times a day  trimethoprim  160 mG/sulfamethoxazole 800 mG 1 Tablet(s) Oral daily  vancomycin  IVPB 750 milliGRAM(s) IV Intermittent every 24 hours    MEDICATIONS  (PRN):  acetaminophen     Tablet .. 650 milliGRAM(s) Oral every 6 hours PRN Temp greater or equal to 38C (100.4F), Mild Pain (1 - 3)  aluminum hydroxide/magnesium hydroxide/simethicone Suspension 30 milliLiter(s) Oral every 4 hours PRN Dyspepsia  melatonin 3 milliGRAM(s) Oral at bedtime PRN Insomnia  ondansetron Injectable 4 milliGRAM(s) IV Push every 8 hours PRN Nausea and/or Vomiting  polyethylene glycol 3350 17 Gram(s) Oral daily PRN Constipation           *****REVIEW OF SYSTEM:  GEN: no fever, no chills, no pain  RESP: no SOB, no cough, no sputum  CVS: no chest pain, no palpitations, no edema  GI: no abdominal pain, no nausea, no vomiting, no constipation, no diarrhea  : no dysurea, no frequency, no hematurea  Neuro: no headache, no dizziness  PSYCH: no anxiety, no depression  Derm : no itching, no rash         *****VITAL SIGNS:  T(C): 35.9 (23 @ 11:54), Max: 36.5 (23 @ 20:57)  HR: 85 (23 @ 15:34) (84 - 94)  BP: 146/84 (23 @ 11:54) (145/75 - 147/80)  RR: 18 (23 @ 11:54) (18 - 18)  SpO2: 96% (23 @ 15:34) (96% - 99%)  Wt(kg): --     @ 07:01  -   @ 07:00  --------------------------------------------------------  IN: 100 mL / OUT: 0 mL / NET: 100 mL             *****PHYSICAL EXAM:   Alert oriented x 3   Attention comprehension are fair. Able to name, repeat, read without any difficulty.   Able to follow 3 step commands.     EOMI fundi not visualized,  VFF to confrontration  No facial asymmetry   Tongue is midline   Palate elevates symmetrically   Moving all 4 ext symmetrically no pronator drift   Reflexes are symmetric throughout   sensation is grossly symmetric  Gait : not assessed.  B/L down going toes               *****LAB AND IMAGIN.0   49.00 )-----------(        ( 2023 16:52 )             34.1               02-02    136  |  100  |  34<H>  ----------------------------<  132<H>  3.4<L>   |  21<L>  |  0.87    Ca    8.9      2023 16:52  Phos  3.6     02-  Mg     1.7     02-    TPro  6.5  /  Alb  3.5  /  TBili  1.3<H>  /  DBili  x   /  AST  68<H>  /  ALT  14  /  AlkPhos  61  02-02                            Urinalysis Basic - ( 2023 09:57 )    Color: Yellow / Appearance: Slightly Turbid / S.033 / pH: x  Gluc: x / Ketone: Trace  / Bili: Negative / Urobili: Negative   Blood: x / Protein: 300 mg/dL / Nitrite: Negative   Leuk Esterase: Negative / RBC: 4 /hpf /  /HPF   Sq Epi: x / Non Sq Epi: 10 /hpf / Bacteria: Moderate        [All pertinent recent Imaging reports reviewed]         *****A S S E S S M E N T   A N D   P L A N :  Excerpt from H&P,"   82 yr old female with a PMHx of diffuse large B cell lymphoma in remission, non-hodgkin's lymphoma (chemoport), depression, HLD, GERD, PE/DVT (2021 no longer on Eliquis), 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 She presented here from [facility] for elevated WBC and low hemoglobin, severe weakness. Pt states for the past 2-3 days has been having increased weakness and lethargy, decreased appetite. +sputum productive cough. + cui, no sob, no difficulty breathing. No abdominal pain, nausea, vomiting, no bloody stools. Has endorsed multiple episodes of loose stools x weeks, currently being treated for c.diff with oral vancomycin        Problem/Recommendations 1:  mutism   likely metabolic encephalopathy due to sepsis, abx related vs malnutrition worsening underlying cognitive impairment   gut microbiome depletion due to recurrent abx   consider lactobacillus   now improving spontaneously   thiamine iv  encourage po intake   avoid cefepime   sleep wake cycle regulation           Problem/Recommendations 2:    leukocytosis   defer to id/onc follow up cultures   flow cytometry    r/o cdiff    low platelet          ___________________________  Will follow with you.  Thank you,  Alina Wasserman MD  Diplomate of the American Board of Neurology and Psychiatry.  Diplomate of the American Board of Vascular Neurology.   [facility]   Ph: [phone]    Differential diagnosis and plan of care discussed with patient after the evaluation.   Advanced care planning options discussed.   Pain assessed and judicious use of narcotics when appropriate was discussed.  Importance of Fall prevention discussed.  Counseling on Smoking and Alcohol cessation was offered when appropriate.  Counseling on Diet, exercise, and medication compliance was done.   83 minutes spent on the total encounter;  more than 50 % of the visit was spent on counseling  and or coordinating care by the attending physician.    Thank you for allowing me to participate in the care of this ruth patient. Please do not hesitate to call me if you have any questions.     This and subsequent notes  will  inherently be subject to errors including those of syntax and substitutions which may escape proofreading. In such instances original meaning may be extrapolated by contextual derivation.

## 2023-02-02 NOTE — CHART NOTE - NSCHARTNOTEFT_GEN_A_CORE
Nutrition Follow Up Note  Patient seen for: malnutrition follow-up    Chart reviewed, events noted. 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 She presented here from [facility] for elevated WBC and low hemoglobin, severe weakness." Fatigue due to amenia, transferred. Heme/Onc following, new diagnosis of MDS.     Source: [x] Patient       [x] EMR        [] RN        [x] Family at bedside-son at bedside        [] Other:    -If unable to interview patient: [] Trach/Vent/BiPAP  [] Disoriented/confused/inappropriate to interview    Diet Order:   Diet, Regular:   1200mL Fluid Restriction ([id number])  Supplement Feeding Modality:  Oral  Ensure Enlive Cans or Servings Per Day:  3       Frequency:  Daily (01-26-23)    - Is current order appropriate/adequate? [x] Yes  []  No:     - PO intake :   [] >75%  Adequate    [] 50-75%  Fair       [x] <50%  Poor    - Nutrition-related concerns:  -pt noted with variable PO intake during admission, previously consuming % last week, now consuming 0-25% of meals per nursing flowsheet   -pt seen with son at bedside, attempting to feed patient orange. Pt reports didn't consume any breakfast this morning. Son states patient's daughter plans to bring cheeseburger for lunch.   - Discussed with patient importance of PO intake and prioritizing sources of protein to maintain lean body mass. Obtained food preferences, pt agreeable to receiving greek yogurt.    -Pt noted with stockpile of Ensure Plus High Protein shakes at bedside, states she doesn't like the consistency. Pt reports she preference Ensure Clear (mixed berry flavor). Will discuss with provider.   - Patient/family with no nutrition-related questions at this time. Made aware RD remains available as needed.     GI:  Last BM _2/2/23_.   Bowel Regimen? [x] Yes   [] No      Weights:   52.2Kg (01-12)-? accuracy   43.9kg (per previous RD note on 1/26)    Nutritionally Pertinent MEDICATIONS  (STANDING):  cholecalciferol  folic acid  furosemide   Injectable  meropenem  IVPB  metoprolol tartrate  pantoprazole    Tablet  predniSONE   Tablet  senna  sodium bicarbonate  trimethoprim  160 mG/sulfamethoxazole 800 mG  vancomycin  IVPB    Pertinent Labs:     Finger Sticks:  POCT Blood Glucose.: 145 mg/dL (02-01 @ 15:56)      Skin per nursing documentation: free of pressure injuries per nursing flow sheets, IAD   Edema: none     Estimated Needs:   [x] no change since previous assessment  [] recalculated:     Previous Nutrition Diagnosis: Severe malnutrition   Nutrition Diagnosis is: [x] ongoing  [] resolved [] not applicable     New Nutrition Diagnosis: [x] Not applicable    Nutrition Care Plan:  [x] In Progress  [] Achieved  [] Not applicable    Nutrition Interventions:     Education Provided:       [] Yes:  [x] No:        Recommendations:         [x] Continue current regular diet as tolerated. Defer fluids restriction to team      [x] Recommend change supplement to Ensure Clear BID (mixed berry flavor is available) per pt preference     [x] Encourage intake of protein-rich foods, RD to add greek yogurt.      [x] Obtain/honor food preferences as able.      [x] Family encouraged to bring in comfort foods from home (within dietary restrictions) to help encourage PO intake.      [x] RD to remain available and follow-up as medically appropriate.     Monitoring and Evaluation:   Continue to monitor nutritional intake, tolerance to diet prescription, weights, labs, skin integrity      RD remains available upon request and will follow up per protocol  Mayi Lucia RD, CDN, Teams or Pager # [phone]

## 2023-02-02 NOTE — CHART NOTE - NSCHARTNOTEFT_GEN_A_CORE
Discussed with patient and her two sons about her current condition which includes bacteremia, worsening lung parenchymal findings concerning for ANCA Vasculitis relapse and newly diagnosed myelodysplastic syndrome with excess blasts. Patient's sons aware that she is very sick. Patient's own wishes are to be aggressive in treating these conditions. Discussed that Rituximab would best treat her Vasculitis. However, the bacteremia has to be treated first. Patient and family in agreement to treat infection first. Patient to remain on her current Prednisone dose. We will monitor her status with the hope that infection resolves so that she can receive Rituximab after she has completed her course of antibiotics.     Discussed with Dr. Cruz, Attending    Jesus Ritter MD  Rheumatology Fellow, PGY-5  Available on TEAMS

## 2023-02-02 NOTE — PROGRESS NOTE ADULT - ASSESSMENT
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 She presented here from [facility] for elevated WBC and low hemoglobin, severe weakness. Pt states for the past 2-3 days has been having increased weakness and lethargy, decreased appetite. Reports chronic cough, currently nonproductive - RVP/COVID negative. Has multiple episodes of loose stools x weeks, reported recently trying marijuana for appetite and noted worsening. She was being treated empirically for c.diff with oral vancomycin but then became constipated, s/p bowel regimen and having normal bowel movements.   Patient initially being monitored off antibiotics given she was afebrile, WBC was stable and no infectious source was found. She had a fever 100.9F on 1/30 overnight with worsening leukocytosis and then 101.4F overnight 1/31 and noted with confusion. Repeat RVP/COVID negative.     Sepsis due to E. faecalis and Staph epi bacteremia  Unclear sources, possible skin or port as source for Staph epi  Rule out UTI, ?maybe gu source   H/o PCN allergy with hives   - fever curve improved - afebrile x48h, WBC downtrended/stable  - AMS - possible neurotoxicity vs infectious etiology for confusion --discontinued cefepime 1/31   - mental status appears improved today  - L chest port accessed without erythema or TTP - no sign of infection  - repeat CXR with bibasilar hazy opacification - may be atelectasis or pneumonia   - 1/30 Bcx (1 set sent) -- both bottles grew Staph epi - MRSE -- sensitivities noted   - 1/31 Bcx (1 set sent) - aerobic bottle grew E. faecalis and MRSE  - UA with pyuria in both specimens sent 2/1  - follow urine cultures, in process   - follow blood cultures for sensitivities   - follow repeat blood cultures x2 from 2/1  - TTE ordered to rule out vegetations   - continue on vancomycin 750mg IV Q24h (renally dosed)  - continue on meropenem 1g IV Q12h for now  -- d/w Dr. 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  H/o DLBCL, EBV+   - Pulmonary, Rheumatology and Heme/Onc following   - noted patient had similar presentation with bicytopenia and leukocytosis in the past, may need tx with rituximab   - s/p BMBx 1/23 - found with myelodysplastic syndrome    - quantiferon indeterminate - pt on steroids which can cause indeterminate results    - CT - s/p wedge resections in b/l upper lobes, 2 cm nodular opacity a/w staple line in RUL; multiple ill-defined b/l opacities more in RLL -unclear etiology, b/l effusions R>L   - continue on Bactrim DS 1 tablet daily for PCP ppx while on prednisone   - monitor CBC, transfusions as needed per primary team    L epistaxis   - ENT following, packing in place   - patient on vancomycin for GP coverage      D/w Dr. Hugo Malave M.D.  [facility], Division of Infectious Diseases  [phone]  After 5pm on weekdays and all day on weekends - please call [phone]

## 2023-02-02 NOTE — PROGRESS NOTE ADULT - NSPROGADDITIONALINFOA_GEN_ALL_CORE
d/w pt and NP.  d/w family at bedside.   d/w pulm Dr. Miller.     AM Labs pending. check electrolytes on IV Lasix. wean O2 as tolerates.  monitor Hgb.  d/w ID, not ready for chemo yet until stable blood cultures.   heme/onc follow up.     - Dr. MARY Arias ([facility])  - [phone]

## 2023-02-02 NOTE — PROGRESS NOTE ADULT - ASSESSMENT
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 Hematology consulted for further work up.     # New diagnosis MDS with EB  # Hx of DLBCL EBV+  - Follows with Dr Madrigal, s/p R-mini-chop in 2021   - Recent bone marrow biopsy for new anemia in Nov /2022 did not show any disease recurrence.   - CBC at admission with anemia of 5.9; platelet count of 68, wbc of 45.18. Review of records, patient with anemia since at least october 2022, however thrombocytopenia and leukocytosis is new.   - Received pRBC transfusion and responded appropriately, platelets downtrending ~30k  - Iron panel consistent AOCD; Ferritin elevated at 5768; B12 elevated, folate>20   - Coags elevated, fibrinogen pending  - CT C/A/P with contrast without signs of LAD however, Multiple ill-defined opacities are noted within both lungs, more so in the right lower lobe. Exact etiology is unclear. B/L pleural effusions R>L  - reticulocyte count wnl,  elevated from 200s prior, procal mildly elevated .16  - RVP/covid negative, Bcx ngtd, no UA/Ucx available to review  - peripheral smear reviewed by hematology team during the initial consultation after this admission: no blasts, but immature WBCs noted, no schistocytes, thrombocytopenia noted   - Bone marrow biopsy 1/23/2023; Pathology consistent with MDS- EB 10-15% blasts . Hematology team discussed with her hematologist Dr. Madrigal at [facility]: recommend for plan.   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 Hematology consulted for further work up.          # New diagnosis MDS with EB      # Hx of DLBCL EBV+     - Follows with Dr Madrigal, s/p R-mini-chop in 2021; Recent bone marrow biopsy for new anemia in Nov /2022 did not show any disease recurrence.      - CBC at admission with anemia of 5.9; platelet count of 68, wbc of 45.18. Review of records, patient with anemia since at least october 2022, however thrombocytopenia and leukocytosis is new.      - Received pRBC transfusion and responded appropriately, platelets downtrending ~30k     - Iron panel consistent AOCD; Ferritin elevated at 5768; B12 elevated, folate>20      - CT C/A/P with contrast without signs of LAD however, Multiple ill-defined opacities are noted within both lungs, more so in the right lower lobe. Exact etiology is unclear. B/L pleural effusions R>L     - RVP/covid negative,      - peripheral smear reviewed by hematology team during the initial consultation after this admission: no blasts, but immature WBCs noted, no schistocytes, thrombocytopenia noted      - Bone marrow biopsy 1/23/2023; Pathology consistent with MDS- EB 10-15% blasts .     - Hematology team discussed with her hematologist Dr. Madrigal at [facility]: recommend sending peripheral blood for flow cytometry for blast percentage. Considering rituximab by Rheumatology if pt is able to tolerated.  however bacteremia found on 1/31 blood culx (Staph epidermitis with methicillin resistance) currently meropenem and vaco, and will hold off further chemo or rituximab for now.      -left side Epistaxis found and ENT f/u, nostril pack will be removed 2/4 per note ; plt 18 this morning received one u Plt. Supportive transfusions for plts <10 if afebrile, <15 if febrile, 50 if bleeding, hbg <7      -check coagulation panel including PT/PTT/fibrinogen/D-dimer;      -check Hapto and lactate, although Total jean-claude wnl and it does not support hemolysis      -multiple time lab showing inconsistent Hb/Hct (from 5.9 to 11.8 after one u PRBC), please recheck and f/u with lab if necessary to clarify. Transfuse PRBC if Hb<7     -the rest of care per primary team            # ANCA vasculitis:      - Followed by rheum      - On chronic steroids - 20mg prednisone; on bactrim DS 1 tab daily for ppx            Note is not finalized until signed by attending      Sal Nye PGY5 heme onc fellow      P [phone]

## 2023-02-02 NOTE — PROGRESS NOTE ADULT - ASSESSMENT
82 yr old female with a PMHx of diffuse large B cell lymphoma in remission, non-hodgkin's lymphoma (chemoport), depression, HLD, GERD, PE/DVT (4/2021 no longer on Eliquis), ANCA-vasculitis (20 y/a, no meds), s/p LVATS, LUIS wedge resection (2021), recent direct admit for RVATS, RUL Nodule Biopsy w/ IR marking in [facility], path favoring vasculitis, treated with Decadron, then switched to PO prednisone, went to [facility]. She presented here from [facility] for elevated WBC and low hemoglobin, severe weakness. Pt states for the past 2-3 days has been having increased weakness and lethargy, decreased appetite. +sputum productive cough. + cui, no sob, no difficulty breathing. No abdominal pain, nausea, vomiting, no bloody stools. Has endorsed multiple episodes of loose stools x weeks, currently being treated for c.diff with oral vancomycin.       Fatigue/dyspnea: likely due to severe anemia :  pt s/p 1 unit PRB  Diarrhea  Pulmonary vasculitis  :  hx of Tachycardia :  Recent TTE on 11/3/22 reviewed: normal LV. outpt card Dr. Ady Cooper  Anxiety and depression  GERD (gastroesophageal reflux disease)  Hyperlipidemia.   DVT ppx     1/20:  Fatigue/dyspnea: likely due to severe anemia :  pt s/p 1 unit PRBC: hb now  > 10  : she is on 2 L of oxygen : no wheezing: no overt signs of bleeding : ct chest is abnormal report noted:  has jean-claude ill defined opacities of unclear etiology  : venous ph is mild acidotic:  no need for Bipap at this time : monitor and trend hb  Diarrhea : symptomatic rx:  workup per primary team  Pulmonary vasculitis  : per rheumatology  : had recent vats surgery : LN biopsy noted:   hx of Tachycardia :  Recent TTE on 11/3/22 reviewed: normal LV. outpt card Dr. Ady Cooper  Anxiety and depression  GERD (gastroesophageal reflux disease) : ppi   Hyperlipidemia.   recent covid  : o n last admission she was treated for covid infection:   DVT ppx   d wteam    1/22:    Fatigue/dyspnea: likely due to severe anemia :  pt s/p 1 unit PRBC: hb now  > 10  : she is on 2 L of oxygen : no wheezing: no overt signs of bleeding : ct chest is abnormal report noted:  has jean-claude ill defined opacities of unclear etiology  : venous ph is mild acidotic:  no need for Bipap at this time : monitor and trend hb: she remained stable o gracie last 1 days:  she is not on any antibtiocs at this time: RVP  and blood cultures are negative: she had ebus biopsy also before: reviewed: ID following  Diarrhea : symptomatic rx:  workup per primary team : seems to have resolved  Pulmonary vasculitis  : per rheumatology  : had recent vats surgery : LN biopsy noted:   Bicytopneia and leucocytosis: ? etiology  : for possible bone marrow biopsy on Monday    hx of Tachycardia :  Recent TTE on 11/3/22 reviewed: normal LV. outpt card Dr. Ady Cooper  Anxiety and depression  GERD (gastroesophageal reflux disease) : ppi   Hyperlipidemia.   recent covid  : on last admission she was treated for covid infection:   DVT ppx   dw team    1/23:    Fatigue/dyspnea: likely due to severe anemia : feels weak  but no bleeding noted:  on 2 L of oxygen : she needs PT OT   Diarrhea :resolved:   Pulmonary vasculitis  : per rheumatology  : had recent vats surgery : LN biopsy noted: : she never received teat ment for vasculitis except low dose steroids:  she is awaiting bone marrow biopsy prior to induction therapy with rituximab: The ct chest done on this admission does not show pneumothorax and has jean-claude effusions:  There are some new opacites in rigth lower lobe which were not therre:  clinically she does not seem to have pneumonia: ID following: could it be a prt of vasculitis  Bicytopneia and leucocytosis: ? etiology  : for possible bone marrow biopsy today  hx of Tachycardia :  Recent TTE on 11/3/22 reviewed: normal LV. outpt card Dr. Ady Cooper  Anxiety and depression  GERD (gastroesophageal reflux disease) : ppi   Hyperlipidemia.   recent covid  : on last admission she was treated for covid infection:   DVT ppx   dw team    1/24:    Fatigue/dyspnea: likely due to severe anemia : feels weak  but no bleeding noted:  on 2 L of oxygen : she needs PT OT   Diarrhea :resolved:   Pulmonary vasculitis  : per rheumatology  : had recent vats surgery : LN biopsy noted: : she never received teat ment for vasculitis except low dose steroids:  she is awaiting bone marrow biopsy prior to induction therapy with rituximab: The ct chest done on this admission does not show pneumothorax and has jean-claude effusions:  There are some new opacites in rigth lower lobe which were not therre:  clinically she does not seem to have pneumonia: ID following: could it be a prt of vasculitis  Bicytopneia and leucocytosis: BM biopsy done: await results for eventual immunosuppressives therapy:   hx of Tachycardia :  Recent TTE on 11/3/22 reviewed: normal LV. outpt card Dr. Ady Cooper  Anxiety and depression  GERD (gastroesophageal reflux disease) : ppi   Hyperlipidemia.   recent covid  : on last admission she was treated for covid infection:   DVT ppx   dw team    1/25:    Fatigue/dyspnea: likely due to severe anemia : feels weak  but no bleeding noted:  on 2 L of oxygen : she needs PT OT   Diarrhea :resolved:   Pulmonary vasculitis  : per rheumatology  : had recent vats surgery : LN biopsy noted: : she never received teat ment for vasculitis except low dose steroids:  she is awaiting bone marrow biopsy prior to induction therapy with rituximab: The ct chest done on this admission does not show pneumothorax and has jean-claude effusions:  There are some new opacites in rigth lower lobe which were not there:  clinically she does not seem to have pneumonia: ID following: could it be a part of vasculitis : her resp status has not changed   Bicytopneia and leucocytosis: BM biopsy done: await results for still pending   hx of Tachycardia :  Recent TTE on 11/3/22 reviewed: normal LV. outpt card Dr. Ady Cooper  Anxiety and depression  GERD (gastroesophageal reflux disease) : ppi   Hyperlipidemia.   recent covid  : on last admission she was treated for covid infection:   DVT ppx   dw team: awaiting decision on immunosuppression     1/26;      Fatigue/dyspnea: likely due to severe anemia : feels weak  but no bleeding noted:  on 2 L of oxygen : she needs PT OT : got it yesterday    Diarrhea :resolved:   Pulmonary vasculitis  : per rheumatology  : had recent vats surgery : LN biopsy noted: : she never received teat ment for vasculitis except low dose steroids:  she is awaiting bone marrow biopsy prior to induction therapy with rituximab: The ct chest done on this admission does not show pneumothorax and has jean-claude effusions:  There are some new opacities in rigth lower lobe which were not there:  clinically she does not seem to have pneumonia: ID following: could it be a part of vasculitis : her resp status has not changed  : being observed off antibiotics   Bicytopneia and leucocytosis: BM biopsy done: await results for still pending   hx of Tachycardia :  Recent TTE on 11/3/22 reviewed: normal LV. outpt card Dr. Ady Cooper  Anxiety and depression  GERD (gastroesophageal reflux disease) : ppi   Hyperlipidemia.   recent covid  : on last admission she was treated for covid infection:   DVT ppx   dw team: awaiting decision on immunosuppression     1/27:    Fatigue/dyspnea: likely due to severe anemia : feels weak  but no bleeding noted:  on 2 L of oxygen : cont  PT OT :   Diarrhea :resolved:   Pulmonary vasculitis  : per rheumatology  : had recent vats surgery : LN biopsy noted: : she never received teat ment for vasculitis except low dose steroids:  she is awaiting bone marrow biopsy prior to induction therapy with rituximab: The ct chest done on this admission does not show pneumothorax and has jean-claude effusions:  There are some new opacities in rigth lower lobe which were not there:  clinically she does not seem to have pneumonia: ID following: could it be a part of vasculitis : her resp status has not changed  : being observed off antibiotics :  on bactrim prophylaxis as she is on chr steroids   Bicytopneia and leucocytosis: BM biopsy done: await results for still pending   hx of Tachycardia :  Recent TTE on 11/3/22 reviewed: normal LV. outpt card Dr. Ady Cooper  Anxiety and depression  GERD (gastroesophageal reflux disease) : ppi   Hyperlipidemia.   recent covid  : on last admission she was treated for covid infection:   DVT ppx   dw team: awaiting decision on immunosuppression     1/29:    Fatigue/dyspnea: likely due to severe anemia : feels weak  but no bleeding noted:  on 2 L of oxygen : cont  PT OT :   Diarrhea :resolved:   Pulmonary vasculitis  : per rheumatology  : had recent vats surgery : LN biopsy noted: : she never received treat ment for vasculitis except low dose steroids:  she is awaiting bone marrow biopsy prior to induction therapy with rituximab: The ct chest done on this admission does not show pneumothorax and has jean-claude effusions:  There are some new opacities in rigth lower lobe which were not there:  clinically she does not seem to have pneumonia: ID following: could it be a part of vasculitis : her resp status has not changed  : being observed off antibiotics :  on bactrim prophylaxis as she is on chr steroids   Bicytopneia and leucocytosis: BM biopsy: results reviewed defer to [facility]  hx of Tachycardia :  Recent TTE on 11/3/22 reviewed: normal LV.   Anxiety and depression  GERD (gastroesophageal reflux disease) : ppi   Hyperlipidemia.   recent covid  : on last admission she was treated for covid infection:   DVT ppx   dw team: awaiting decision on immunosuppression     1/30:    Fatigue/dyspnea: likely due to severe anemia : feels weak  but no bleeding noted:  on 2 L of oxygen : cont  PT OT : sitting in chair today   Diarrhea :resolved:   Pulmonary vasculitis  : per rheumatology  : had recent vats surgery : LN biopsy noted: : she never received treat ment for vasculitis except low dose steroids:  she is awaiting bone marrow biopsy prior to induction therapy with rituximab: The ct chest done on this admission does not show pneumothorax and has jean-claude effusions:  There are some new opacities in rigth lower lobe which were not there:  clinically she does not seem to have pneumonia: ID following: could it be a part of vasculitis : her resp status has not changed  : being observed off antibiotics :  on bactrim prophylaxis as she is on chr steroids  /l however she had low grade tempt today : rvp is negative : cultures sent: ID follow up   Bicytopneia and leucocytosis: BM biopsy: results reviewed defer to [facility]  hx of Tachycardia :  Recent TTE on 11/3/22 reviewed: normal LV.   Anxiety and depression  GERD (gastroesophageal reflux disease) : ppi   Hyperlipidemia.   recent covid  : on last admission she was treated for covid infection:   DVT ppx   dw team: awaiting decision on immunosuppression     1/31:    Fatigue/dyspnea: likely due to severe anemia : feels weak  but no bleeding noted:  on 2 L of oxygen : cont  PT OT : lying in bed:   Diarrhea :resolved:   Pulmonary vasculitis  : per rheumatology  : had recent vats surgery : LN biopsy noted: : she never received treat ment for vasculitis except low dose steroids:  she is awaiting bone marrow biopsy prior to induction therapy with rituximab: The ct chest done on this admission does not show pneumothorax and has jean-claude effusions:  There are some new opacities in rigth lower lobe which were not there:  clinically she does not seem to have pneumonia: ID following: could it be a part of vasculitis : her resp status has not changed  : being observed off antibiotics :  on bactrim prophylaxis as she is on chr steroids : she seems OK:  no sob:     Bicytopneia and leucocytosis: BM biopsy: results reviewed defer to [facility] ; for eventual chemo   hx of Tachycardia :  Recent TTE on 11/3/22 reviewed: normal LV.   Anxiety and depression  GERD (gastroesophageal reflux disease) : ppi   Hyperlipidemia.   recent covid  : on last admission she was treated for covid infection:   DVT ppx   dw team: awaiting decision on immunosuppression     2/1:    Fatigue/dyspnea: likely due to severe anemia : feels weak  but no bleeding noted:  on 2 L of oxygen : cont  PT OT : lying in bed:  her blood cultures are contaminant:   Diarrhea :resolved:   Pulmonary vasculitis  : per rheumatology  : had recent vats surgery : LN biopsy noted: : she never received treat ment for vasculitis except low dose steroids:  she is awaiting bone marrow biopsy prior to induction therapy with rituximab: The ct chest done on this admission does not show pneumothorax and has jean-claude effusions:  There are some new opacities in rigth lower lobe which were not there:  clinically she does not seem to have pneumonia: ID following: could it be a part of vasculitis : her resp status has not changed  : being observed off antibiotics :  on bactrim prophylaxis as she is on chr steroids : she seems OK:  no sob:     Bicytopneia and leucocytosis: BM biopsy: results reviewed defer to [facility] ; for eventual chemo   hx of Tachycardia :  Recent TTE on 11/3/22 reviewed: normal LV.   Anxiety and depression  GERD (gastroesophageal reflux disease) : ppi   Hyperlipidemia.   recent covid  : on last admission she was treated for covid infection:   DVT ppx   dw team: awaiting decision on immunosuppression: overall her general condition seems very poor and very weak:     2/2:    Fatigue/dyspnea: likely due to severe anemia : feels weak  but no bleeding noted:  on 2 L of oxygen : cont  PT OT : lying in bed:  her blood cultures are positive E FAECALIS :  ON MEROPENEM  Diarrhea :resolved:   Pulmonary vasculitis  : per rheumatology  : had recent vats surgery : LN biopsy noted: : she never received treat ment for vasculitis except low dose steroids:  she is awaiting bone marrow biopsy prior to induction therapy with rituximab: The ct chest done on this admission does not show pneumothorax and has jean-claude effusions:  There are some new opacities in rigth lower lobe which were not there:  clinically she does not seem to have pneumonia: ID following: could it be a part of vasculitis : her resp status has not changed  : being observed off antibiotics :  on bactrim prophylaxis as she is on chr steroids : she seems OK:  no sob:     Bicytopneia and leucocytosis: BM biopsy: results reviewed defer to [facility] ; for eventual chemo   hx of Tachycardia :  Recent TTE on 11/3/22 reviewed: normal LV.   Anxiety and depression  GERD (gastroesophageal reflux disease) : ppi   Hyperlipidemia.   recent covid  : on last admission she was treated for covid infection:   DVT ppx   dw team: awaiting decision on immunosuppression: overall her general condition seems very poor and very weak: ON ANTIBIOTICS

## 2023-02-02 NOTE — PROGRESS NOTE ADULT - ATTENDING COMMENTS
The patient has staph epidermis and enterobacteria blood stream infection and is seen on a bed rest with a face mask oxygen. She has been too ill to be sitting in a chair. Management with antibiotic therapy is to continue as improvement in status hoped for prior to any antineoplastic treatment. ECOG performance status is 3 in bed greater than 50% of awake hours and fully dependant upon nursing staff for all activities of daily living

## 2023-02-02 NOTE — PROGRESS NOTE ADULT - SUBJECTIVE AND OBJECTIVE BOX
SUBJECTIVE/ OVERNIGHT EVENTS:  comfortable  mental status improving  answering questions appropriately  the daughter and the son at bedside.  remain on facemask.   BP stable. IV Lasix 20 mg ordered.   no cp, no sob, no n/v/d. no abdominal pain.  no headache, no dizziness.   nose bleed has stopped. have left nasal packing.       --------------------------------------------------------------------------------------------  LABS:                        11.8   55.63 )-----------( 18       ( 2023 02:35 )             33.7     -    136  |  102  |  30<H>  ----------------------------<  65<L>  3.8   |  21<L>  |  1.02    Ca    8.7      2023 07:11  Phos  3.6     -  Mg     1.8     -    TPro  6.2  /  Alb  3.5  /  TBili  1.2  /  DBili  x   /  AST  39  /  ALT  8<L>  /  AlkPhos  57  -      CAPILLARY BLOOD GLUCOSE      POCT Blood Glucose.: 145 mg/dL (2023 15:56)        Urinalysis Basic - ( 2023 09:57 )    Color: Yellow / Appearance: Slightly Turbid / S.033 / pH: x  Gluc: x / Ketone: Trace  / Bili: Negative / Urobili: Negative   Blood: x / Protein: 300 mg/dL / Nitrite: Negative   Leuk Esterase: Negative / RBC: 4 /hpf /  /HPF   Sq Epi: x / Non Sq Epi: 10 /hpf / Bacteria: Moderate        RADIOLOGY & ADDITIONAL TESTS:    Imaging Personally Reviewed:  [x] YES  [ ] NO    Consultant(s) Notes Reviewed:  [x] YES  [ ] NO    MEDICATIONS  (STANDING):  budesonide 160 MICROgram(s)/formoterol 4.5 MICROgram(s) Inhaler 2 Puff(s) Inhalation two times a day  chlorhexidine 2% Cloths 1 Application(s) Topical daily  cholecalciferol 2000 Unit(s) Oral daily  citalopram 10 milliGRAM(s) Oral daily  fluticasone propionate 50 MICROgram(s)/spray Nasal Spray 1 Spray(s) Both Nostrils two times a day  folic acid 1 milliGRAM(s) Oral daily  furosemide   Injectable 20 milliGRAM(s) IV Push daily  guaiFENesin  milliGRAM(s) Oral every 12 hours  influenza  Vaccine (HIGH DOSE) 0.7 milliLiter(s) IntraMuscular once  ipratropium    for Nebulization 500 MICROGram(s) Nebulizer every 6 hours  meropenem  IVPB 1000 milliGRAM(s) IV Intermittent every 12 hours  metoprolol tartrate 25 milliGRAM(s) Oral two times a day  mirtazapine 7.5 milliGRAM(s) Oral daily  pantoprazole    Tablet 40 milliGRAM(s) Oral before breakfast  predniSONE   Tablet 20 milliGRAM(s) Oral daily  senna 1 Tablet(s) Oral daily  sodium bicarbonate 650 milliGRAM(s) Oral three times a day  trimethoprim  160 mG/sulfamethoxazole 800 mG 1 Tablet(s) Oral daily  vancomycin  IVPB 750 milliGRAM(s) IV Intermittent every 24 hours    MEDICATIONS  (PRN):  acetaminophen     Tablet .. 650 milliGRAM(s) Oral every 6 hours PRN Temp greater or equal to 38C (100.4F), Mild Pain (1 - 3)  aluminum hydroxide/magnesium hydroxide/simethicone Suspension 30 milliLiter(s) Oral every 4 hours PRN Dyspepsia  melatonin 3 milliGRAM(s) Oral at bedtime PRN Insomnia  ondansetron Injectable 4 milliGRAM(s) IV Push every 8 hours PRN Nausea and/or Vomiting  polyethylene glycol 3350 17 Gram(s) Oral daily PRN Constipation      Care Discussed with Consultants/Other Providers [x] YES  [ ] NO    Vital Signs Last 24 Hrs  T(C): 35.9 (2023 11:54), Max: 36.9 (2023 16:47)  T(F): 96.6 (2023 11:54), Max: 98.4 (2023 16:47)  HR: 85 (2023 11:54) (84 - 107)  BP: 146/84 (2023 11:54) (121/61 - 147/80)  BP(mean): --  RR: 18 (2023 11:54) (18 - 18)  SpO2: 97% (2023 11:54) (90% - 100%)    Parameters below as of 2023 11:54  Patient On (Oxygen Delivery Method): mask, nonrebreather      I&O's Summary    2023 07:01  -  2023 07:00  --------------------------------------------------------  IN: 100 mL / OUT: 0 mL / NET: 100 mL      PHYSICAL EXAM:  GENERAL: NAD, well-developed, comfortable on facemask.   HEAD:  Atraumatic, Normocephalic  NOSE: left nostril nasal packing in place  EYES: EOMI, PERRLA, conjunctiva and sclera clear  NECK: Supple, No JVD  CHEST/LUNG: mild decrease breath sounds bilaterally; No wheeze   HEART: Regular rate and rhythm; No murmurs, rubs, or gallops  ABDOMEN: Soft, Nontender, Nondistended; Bowel sounds present  Neuro: awake alert. slightly confused. no focal weakness  EXTREMITIES:  2+ Peripheral Pulses, No clubbing, cyanosis, trace b/l edema

## 2023-02-03 NOTE — PROGRESS NOTE ADULT - SUBJECTIVE AND OBJECTIVE BOX
[facility]      Seen earlier today [Please note that date of entry above  is the actual  DATE OF SERVICE] No new neurological symptoms reported. Remains stable neurologically.   - Today, patient is without complaints.          *****MEDICATIONS: Current medication reviewed and documented.    MEDICATIONS  (STANDING):  budesonide 160 MICROgram(s)/formoterol 4.5 MICROgram(s) Inhaler 2 Puff(s) Inhalation two times a day  chlorhexidine 2% Cloths 1 Application(s) Topical daily  cholecalciferol 2000 Unit(s) Oral daily  citalopram 10 milliGRAM(s) Oral daily  fluticasone propionate 50 MICROgram(s)/spray Nasal Spray 1 Spray(s) Both Nostrils two times a day  folic acid 1 milliGRAM(s) Oral daily  furosemide   Injectable 20 milliGRAM(s) IV Push daily  guaiFENesin  milliGRAM(s) Oral every 12 hours  influenza  Vaccine (HIGH DOSE) 0.7 milliLiter(s) IntraMuscular once  ipratropium    for Nebulization 500 MICROGram(s) Nebulizer every 6 hours  lactobacillus acidophilus 1 Tablet(s) Oral daily  magnesium sulfate  IVPB 1 Gram(s) IV Intermittent once  meropenem  IVPB 1000 milliGRAM(s) IV Intermittent every 12 hours  metoprolol tartrate 25 milliGRAM(s) Oral two times a day  mirtazapine 7.5 milliGRAM(s) Oral daily  pantoprazole    Tablet 40 milliGRAM(s) Oral before breakfast  potassium chloride    Tablet ER 20 milliEquivalent(s) Oral once  predniSONE   Tablet 20 milliGRAM(s) Oral daily  senna 1 Tablet(s) Oral daily  sodium bicarbonate 650 milliGRAM(s) Oral three times a day  thiamine Injectable 100 milliGRAM(s) IV Push three times a day  thiamine IVPB 500 milliGRAM(s) IV Intermittent once  trimethoprim  160 mG/sulfamethoxazole 800 mG 1 Tablet(s) Oral daily  vancomycin  IVPB 750 milliGRAM(s) IV Intermittent every 24 hours    MEDICATIONS  (PRN):  acetaminophen     Tablet .. 650 milliGRAM(s) Oral every 6 hours PRN Temp greater or equal to 38C (100.4F), Mild Pain (1 - 3)  aluminum hydroxide/magnesium hydroxide/simethicone Suspension 30 milliLiter(s) Oral every 4 hours PRN Dyspepsia  melatonin 3 milliGRAM(s) Oral at bedtime PRN Insomnia  ondansetron Injectable 4 milliGRAM(s) IV Push every 8 hours PRN Nausea and/or Vomiting  polyethylene glycol 3350 17 Gram(s) Oral daily PRN Constipation          ***** VITAL SIGNS:   Vital Signs Last 24 Hrs       T(F): 97.4 (23 @ 12:44), Max: 97.4 (23 @ 12:44)  HR: 88 (23 @ 12:44) (85 - 94)  BP: 100/60 (23 @ 12:44) (100/60 - 144/84)  RR: 18 (23 @ 12:44) (18 - 18)  SpO2: 94% (23 @ 12:44) (94% - 100%)  Wt(kg): --  I&O's Summary           *****PHYSICAL EXAM:   alert oriented x 3 attention comprehension are fair but limited   as pt does not fully cooperate with exam   EOmi fundi not visualized   no nystagmus VFF to confrontation  Tongue is midline   Moving all 4 ext spontaneously no drift appreciated    Gait not assessed.            *****LAB AND IMAGIN.0   49.00 )-----------( 26       ( 2023 16:52 )             34.1               -    136  |  100  |  34<H>  ----------------------------<  132<H>  3.4<L>   |  21<L>  |  0.87    Ca    8.9      2023 16:52  Mg     1.7         TPro  6.5  /  Alb  3.5  /  TBili  1.3<H>  /  DBili  x   /  AST  68<H>  /  ALT  14  /  AlkPhos  61                           [All pertinent recent Imaging/Reports reviewed]            *****A S S E S S M E N T   A N D   P L A N :  Excerpt from H&P,"   82 yr old female with a PMHx of diffuse large B cell lymphoma in remission, non-hodgkin's lymphoma (chemoport), depression, HLD, GERD, 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 She presented here from [email or web address] for elevated WBC and low hemoglobin, severe weakness. Pt states for the past 2-3 days has been having increased weakness and lethargy, decreased appetite. +sputum productive cough. + cui, no sob, no difficulty breathing. No abdominal pain, nausea, vomiting, no bloody stools. Has endorsed multiple episodes of loose stools x weeks, currently being treated for c.diff with oral vancomycin        Problem/Recommendations 1:  mutism   likely metabolic encephalopathy due to sepsis, abx related vs malnutrition worsening underlying cognitive impairment   gut microbiome depletion due to recurrent abx   consider lactobacillus   now improving spontaneously   thiamine iv  encourage po intake   avoid cefepime   sleep wake cycle regulation           Problem/Recommendations 2:    leukocytosis   defer to id/onc follow up cultures   flow cytometry    r/o cdiff    low platelet   monitor closely   dic ? ?  further worening leukocytosis s         Thank you for allowing me to participate in the care of this patient. Will continue to follow patient periodically. Please do not hesitate to call me if you have any  questions or if there has been a change in patients neurological status     ________________  Alina Wasserman MD  [facility] ([facility]  [phone]      33 minutes spent on total encounter; more than 50 % of the visit was  spent counseling about plan of care, compliance to diet/exercise and medication regimen and or  coordinating care by the attending physician.      It is advised that stroke patients follow up with JUSTIN Umanzor @ [phone] in 1- 2 weeks.   Others please follow up with Dr. Michael Nissenbaum [phone]

## 2023-02-03 NOTE — PROGRESS NOTE ADULT - ASSESSMENT
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 She presented here from [facility] for elevated WBC and low hemoglobin, severe weakness. Pt states for the past 2-3 days has been having increased weakness and lethargy, decreased appetite. +sputum productive cough. + cui, no sob, no difficulty breathing. No abdominal pain, nausea, vomiting, no bloody stools. Has endorsed multiple episodes of loose stools x weeks, currently being treated for c.diff with oral vancomycin.       Fatigue/dyspnea: likely due to severe anemia :  pt s/p 1 unit PRB  Diarrhea  Pulmonary vasculitis  :  hx of Tachycardia :  Recent TTE on 11/3/22 reviewed: normal LV. outpt card Dr. Ady Cooper  Anxiety and depression  GERD (gastroesophageal reflux disease)  Hyperlipidemia.   DVT ppx     1/20:  Fatigue/dyspnea: likely due to severe anemia :  pt s/p 1 unit PRBC: hb now  > 10  : she is on 2 L of oxygen : no wheezing: no overt signs of bleeding : ct chest is abnormal report noted:  has jena-claude ill defined opacities of unclear etiology  : venous ph is mild acidotic:  no need for Bipap at this time : monitor and trend hb  Diarrhea : symptomatic rx:  workup per primary team  Pulmonary vasculitis  : per rheumatology  : had recent vats surgery : LN biopsy noted:   hx of Tachycardia :  Recent TTE on 11/3/22 reviewed: normal LV. outpt card Dr. Ady Cooper  Anxiety and depression  GERD (gastroesophageal reflux disease) : ppi   Hyperlipidemia.   recent covid  : o n last admission she was treated for covid infection:   DVT ppx   d wteam    1/22:    Fatigue/dyspnea: likely due to severe anemia :  pt s/p 1 unit PRBC: hb now  > 10  : she is on 2 L of oxygen : no wheezing: no overt signs of bleeding : ct chest is abnormal report noted:  has jean-claude ill defined opacities of unclear etiology  : venous ph is mild acidotic:  no need for Bipap at this time : monitor and trend hb: she remained stable o gracie last 1 days:  she is not on any antibtiocs at this time: RVP  and blood cultures are negative: she had ebus biopsy also before: reviewed: ID following  Diarrhea : symptomatic rx:  workup per primary team : seems to have resolved  Pulmonary vasculitis  : per rheumatology  : had recent vats surgery : LN biopsy noted:   Bicytopneia and leucocytosis: ? etiology  : for possible bone marrow biopsy on Monday    hx of Tachycardia :  Recent TTE on 11/3/22 reviewed: normal LV. outpt card Dr. Ady Cooper  Anxiety and depression  GERD (gastroesophageal reflux disease) : ppi   Hyperlipidemia.   recent covid  : on last admission she was treated for covid infection:   DVT ppx   dw team    1/23:    Fatigue/dyspnea: likely due to severe anemia : feels weak  but no bleeding noted:  on 2 L of oxygen : she needs PT OT   Diarrhea :resolved:   Pulmonary vasculitis  : per rheumatology  : had recent vats surgery : LN biopsy noted: : she never received teat ment for vasculitis except low dose steroids:  she is awaiting bone marrow biopsy prior to induction therapy with rituximab: The ct chest done on this admission does not show pneumothorax and has jean-claude effusions:  There are some new opacites in rigth lower lobe which were not therre:  clinically she does not seem to have pneumonia: ID following: could it be a prt of vasculitis  Bicytopneia and leucocytosis: ? etiology  : for possible bone marrow biopsy today  hx of Tachycardia :  Recent TTE on 11/3/22 reviewed: normal LV. outpt card Dr. Ady Cooper  Anxiety and depression  GERD (gastroesophageal reflux disease) : ppi   Hyperlipidemia.   recent covid  : on last admission she was treated for covid infection:   DVT ppx   dw team    1/24:    Fatigue/dyspnea: likely due to severe anemia : feels weak  but no bleeding noted:  on 2 L of oxygen : she needs PT OT   Diarrhea :resolved:   Pulmonary vasculitis  : per rheumatology  : had recent vats surgery : LN biopsy noted: : she never received teat ment for vasculitis except low dose steroids:  she is awaiting bone marrow biopsy prior to induction therapy with rituximab: The ct chest done on this admission does not show pneumothorax and has jean-claude effusions:  There are some new opacites in rigth lower lobe which were not therre:  clinically she does not seem to have pneumonia: ID following: could it be a prt of vasculitis  Bicytopneia and leucocytosis: BM biopsy done: await results for eventual immunosuppressives therapy:   hx of Tachycardia :  Recent TTE on 11/3/22 reviewed: normal LV. outpt card Dr. Ady Cooper  Anxiety and depression  GERD (gastroesophageal reflux disease) : ppi   Hyperlipidemia.   recent covid  : on last admission she was treated for covid infection:   DVT ppx   dw team    1/25:    Fatigue/dyspnea: likely due to severe anemia : feels weak  but no bleeding noted:  on 2 L of oxygen : she needs PT OT   Diarrhea :resolved:   Pulmonary vasculitis  : per rheumatology  : had recent vats surgery : LN biopsy noted: : she never received teat ment for vasculitis except low dose steroids:  she is awaiting bone marrow biopsy prior to induction therapy with rituximab: The ct chest done on this admission does not show pneumothorax and has jean-claude effusions:  There are some new opacites in rigth lower lobe which were not there:  clinically she does not seem to have pneumonia: ID following: could it be a part of vasculitis : her resp status has not changed   Bicytopneia and leucocytosis: BM biopsy done: await results for still pending   hx of Tachycardia :  Recent TTE on 11/3/22 reviewed: normal LV. outpt card Dr. Ady Cooper  Anxiety and depression  GERD (gastroesophageal reflux disease) : ppi   Hyperlipidemia.   recent covid  : on last admission she was treated for covid infection:   DVT ppx   dw team: awaiting decision on immunosuppression     1/26;      Fatigue/dyspnea: likely due to severe anemia : feels weak  but no bleeding noted:  on 2 L of oxygen : she needs PT OT : got it yesterday    Diarrhea :resolved:   Pulmonary vasculitis  : per rheumatology  : had recent vats surgery : LN biopsy noted: : she never received teat ment for vasculitis except low dose steroids:  she is awaiting bone marrow biopsy prior to induction therapy with rituximab: The ct chest done on this admission does not show pneumothorax and has jean-claude effusions:  There are some new opacities in rigth lower lobe which were not there:  clinically she does not seem to have pneumonia: ID following: could it be a part of vasculitis : her resp status has not changed  : being observed off antibiotics   Bicytopneia and leucocytosis: BM biopsy done: await results for still pending   hx of Tachycardia :  Recent TTE on 11/3/22 reviewed: normal LV. outpt card Dr. Ady Cooper  Anxiety and depression  GERD (gastroesophageal reflux disease) : ppi   Hyperlipidemia.   recent covid  : on last admission she was treated for covid infection:   DVT ppx   dw team: awaiting decision on immunosuppression     1/27:    Fatigue/dyspnea: likely due to severe anemia : feels weak  but no bleeding noted:  on 2 L of oxygen : cont  PT OT :   Diarrhea :resolved:   Pulmonary vasculitis  : per rheumatology  : had recent vats surgery : LN biopsy noted: : she never received teat ment for vasculitis except low dose steroids:  she is awaiting bone marrow biopsy prior to induction therapy with rituximab: The ct chest done on this admission does not show pneumothorax and has jean-claude effusions:  There are some new opacities in rigth lower lobe which were not there:  clinically she does not seem to have pneumonia: ID following: could it be a part of vasculitis : her resp status has not changed  : being observed off antibiotics :  on bactrim prophylaxis as she is on chr steroids   Bicytopneia and leucocytosis: BM biopsy done: await results for still pending   hx of Tachycardia :  Recent TTE on 11/3/22 reviewed: normal LV. outpt card Dr. Ady Cooper  Anxiety and depression  GERD (gastroesophageal reflux disease) : ppi   Hyperlipidemia.   recent covid  : on last admission she was treated for covid infection:   DVT ppx   dw team: awaiting decision on immunosuppression     1/29:    Fatigue/dyspnea: likely due to severe anemia : feels weak  but no bleeding noted:  on 2 L of oxygen : cont  PT OT :   Diarrhea :resolved:   Pulmonary vasculitis  : per rheumatology  : had recent vats surgery : LN biopsy noted: : she never received treat ment for vasculitis except low dose steroids:  she is awaiting bone marrow biopsy prior to induction therapy with rituximab: The ct chest done on this admission does not show pneumothorax and has jean-claude effusions:  There are some new opacities in rigth lower lobe which were not there:  clinically she does not seem to have pneumonia: ID following: could it be a part of vasculitis : her resp status has not changed  : being observed off antibiotics :  on bactrim prophylaxis as she is on chr steroids   Bicytopneia and leucocytosis: BM biopsy: results reviewed defer to [facility]  hx of Tachycardia :  Recent TTE on 11/3/22 reviewed: normal LV.   Anxiety and depression  GERD (gastroesophageal reflux disease) : ppi   Hyperlipidemia.   recent covid  : on last admission she was treated for covid infection:   DVT ppx   dw team: awaiting decision on immunosuppression     1/30:    Fatigue/dyspnea: likely due to severe anemia : feels weak  but no bleeding noted:  on 2 L of oxygen : cont  PT OT : sitting in chair today   Diarrhea :resolved:   Pulmonary vasculitis  : per rheumatology  : had recent vats surgery : LN biopsy noted: : she never received treat ment for vasculitis except low dose steroids:  she is awaiting bone marrow biopsy prior to induction therapy with rituximab: The ct chest done on this admission does not show pneumothorax and has jean-claude effusions:  There are some new opacities in rigth lower lobe which were not there:  clinically she does not seem to have pneumonia: ID following: could it be a part of vasculitis : her resp status has not changed  : being observed off antibiotics :  on bactrim prophylaxis as she is on chr steroids  /l however she had low grade tempt today : rvp is negative : cultures sent: ID follow up   Bicytopneia and leucocytosis: BM biopsy: results reviewed defer to [facility]  hx of Tachycardia :  Recent TTE on 11/3/22 reviewed: normal LV.   Anxiety and depression  GERD (gastroesophageal reflux disease) : ppi   Hyperlipidemia.   recent covid  : on last admission she was treated for covid infection:   DVT ppx   dw team: awaiting decision on immunosuppression     1/31:    Fatigue/dyspnea: likely due to severe anemia : feels weak  but no bleeding noted:  on 2 L of oxygen : cont  PT OT : lying in bed:   Diarrhea :resolved:   Pulmonary vasculitis  : per rheumatology  : had recent vats surgery : LN biopsy noted: : she never received treat ment for vasculitis except low dose steroids:  she is awaiting bone marrow biopsy prior to induction therapy with rituximab: The ct chest done on this admission does not show pneumothorax and has jean-claude effusions:  There are some new opacities in rigth lower lobe which were not there:  clinically she does not seem to have pneumonia: ID following: could it be a part of vasculitis : her resp status has not changed  : being observed off antibiotics :  on bactrim prophylaxis as she is on chr steroids : she seems OK:  no sob:     Bicytopneia and leucocytosis: BM biopsy: results reviewed defer to [facility] ; for eventual chemo   hx of Tachycardia :  Recent TTE on 11/3/22 reviewed: normal LV.   Anxiety and depression  GERD (gastroesophageal reflux disease) : ppi   Hyperlipidemia.   recent covid  : on last admission she was treated for covid infection:   DVT ppx   dw team: awaiting decision on immunosuppression     2/1:    Fatigue/dyspnea: likely due to severe anemia : feels weak  but no bleeding noted:  on 2 L of oxygen : cont  PT OT : lying in bed:  her blood cultures are contaminant:   Diarrhea :resolved:   Pulmonary vasculitis  : per rheumatology  : had recent vats surgery : LN biopsy noted: : she never received treat ment for vasculitis except low dose steroids:  she is awaiting bone marrow biopsy prior to induction therapy with rituximab: The ct chest done on this admission does not show pneumothorax and has jean-claude effusions:  There are some new opacities in rigth lower lobe which were not there:  clinically she does not seem to have pneumonia: ID following: could it be a part of vasculitis : her resp status has not changed  : being observed off antibiotics :  on bactrim prophylaxis as she is on chr steroids : she seems OK:  no sob:     Bicytopneia and leucocytosis: BM biopsy: results reviewed defer to [facility] ; for eventual chemo   hx of Tachycardia :  Recent TTE on 11/3/22 reviewed: normal LV.   Anxiety and depression  GERD (gastroesophageal reflux disease) : ppi   Hyperlipidemia.   recent covid  : on last admission she was treated for covid infection:   DVT ppx   dw team: awaiting decision on immunosuppression: overall her general condition seems very poor and very weak:     2/2:    Fatigue/dyspnea: likely due to severe anemia : feels weak  but no bleeding noted:  on 2 L of oxygen : cont  PT OT : lying in bed:  her blood cultures are positive E FAECALIS :  ON MEROPENEM  Diarrhea :resolved:   Pulmonary vasculitis  : per rheumatology  : had recent vats surgery : LN biopsy noted: : she never received treat ment for vasculitis except low dose steroids:  she is awaiting bone marrow biopsy prior to induction therapy with rituximab: The ct chest done on this admission does not show pneumothorax and has jean-claude effusions:  There are some new opacities in rigth lower lobe which were not there:  clinically she does not seem to have pneumonia: ID following: could it be a part of vasculitis : her resp status has not changed  : being observed off antibiotics :  on bactrim prophylaxis as she is on chr steroids : she seems OK:  no sob:     Bicytopneia and leucocytosis: BM biopsy: results reviewed defer to [facility] ; for eventual chemo   hx of Tachycardia :  Recent TTE on 11/3/22 reviewed: normal LV.   Anxiety and depression  GERD (gastroesophageal reflux disease) : ppi   Hyperlipidemia.   recent covid  : on last admission she was treated for covid infection:   DVT ppx   dw team: awaiting decision on immunosuppression: overall her general condition seems very poor and very weak: ON ANTIBIOTICS     2/3   Pulmonary Vasculitis  -Steroids per rheum  -Plan for eventual Rituxan  -Bactrim for PCP ppx  MDS  -Per heme/onc  Bacteremia  -E. Faecalis bacteremia  -ABX per ID  Epistaxis  -Per ENT  -Breathing comfortably on 40% humidified face tent, keep sats >90%

## 2023-02-03 NOTE — PROGRESS NOTE ADULT - ASSESSMENT
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 She presented here from [facility] for elevated WBC and low hemoglobin, severe weakness. Pt states for the past 2-3 days has been having increased weakness and lethargy, decreased appetite. +sputum productive cough. + cui, no sob, no difficulty breathing. No abdominal pain, nausea, vomiting, no bloody stools. Has endorsed multiple episodes of loose stools x weeks, currently being treated for c.diff with oral vancomycin.     Fatigue/dyspnea: due to severe anemia  - 2' MDS  - transfuse to keep Hgb above 7.0  - no melena, no hematochezia. no BRBPR. occult stool neg.   - she tends to require IV Lasix intermittently post transfusion.  - doubt GI bleed   - Physical therapy. Out of bed to chair with assistance.     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  - s/p Vanco PO a few days (started in rehab)  - diarrhea completely resolved.   - monitor off PO vanco per ID  - now constipated. PRN bowel regimen started. +BM    Fever, not neutropenic, starting 1/30/23 @ 5:05 AM  - RVP 1/30/23 (-)  - monitor blood cxs 1/30/23  - started empirically on cefepime 1/30/23, switched to merrem 1/31/23  - enterrocus bacteremia, abx per ID.     AMS  - unclear etiology, likely metabolic encephalopathy from ?Cefepime? received 3 doses, last dose 1/31/23  - CT head neg. sugars stable  - vanco IV added per ID.   - close monitoring   - monitor glucose (glucose 65 on BMP, but 95 on fingersticks)  - encourage PO intake   - mental status improving.     Pulmonary vasculitis   - Recent TTE on 11/3/22 reviewed: normal LV. outpt card Dr. Ady Cooper  - outpt pulm Mack Tucker   - s/p thoracoscopic biopsy of mediastinal lymph node and Lung wedge resection 11/10/22  - recent pleural effusion s/p 650 cc U/S-guided rt thoracentesis 11/17/22  - exudative but no neutrophilic predominance and initial Gram stain w/o organisms  - cultures neg.   - path results favoring vasculitis: no evidence for lymphoma. Cytology also came back negative.   - comfortable on nasal canula, better post diuretic last admission.   - rheum and heme-onc following previously, will reconsult.   - last admission, she was not started on immunosuppressants such as cellcept given recent treatment for COVID19 at that time  - c/w prednisone 20 mg qdaily.   - RTX under consideration --> acute hepatitis panel (-) and quantiferon indeterminate  - ID started PCP ppx with Bactrim.     hx of Tachycardia    - cont low-dose metoprolol, 50 mg to 25 mg dose reduced in rehab.   - card consult (Dr. Hooker) in the past, if needed    Anxiety and depression  - stable, continue citalopram and Remeron.  - Pt denied SI/HI ideations, denied visual and auditory hallucinations.     GERD (gastroesophageal reflux disease)  - continue PPI    Hyperlipidemia.   - continue home ezetimibe. okay to hold if nonformulary     DVT ppx   - holding AC in the setting of anemia, pancytopenia.   - venodyne boots LEs

## 2023-02-03 NOTE — PROGRESS NOTE ADULT - ASSESSMENT
83yo female seen for left epistaxis, controlled with rapid rhino nasal packing. No reported bleeding since packing placed. On exam, no active bleeding from nose or posterior pharynx. H/H stable.

## 2023-02-03 NOTE — PROGRESS NOTE ADULT - ASSESSMENT
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 She presented here from [facility] for elevated WBC and low hemoglobin, severe weakness. Pt states for the past 2-3 days has been having increased weakness and lethargy, decreased appetite. Reports chronic cough, currently nonproductive - RVP/COVID negative. Has multiple episodes of loose stools x weeks, reported recently trying marijuana for appetite and noted worsening. She was being treated empirically for c.diff with oral vancomycin but then became constipated, s/p bowel regimen and having normal bowel movements.   Patient initially being monitored off antibiotics given she was afebrile, WBC was stable and no infectious source was found. She had a fever 100.9F on 1/30 overnight with worsening leukocytosis and then 101.4F overnight 1/31 and noted with confusion. Repeat RVP/COVID negative.     Sepsis due to gram positive  bacteremia   E. faecalis bacteremia likely  source    Staph epi bacteremia ?skin vs port source vs contamination  AMS - neurotoxicity d/t cefepime vs infectious etiology --d/c cefepime 1/31  H/o PCN allergy with hives   - now afebrile >48h, WBC downtrended/stable, mental status improved  - L chest port accessed without erythema or TTP - no sign of infection  - repeat CXR with bibasilar hazy opacification - may be atelectasis or pneumonia   - 1/30 Bcx (1 set sent) -- both bottles grew Staph epi - MRSE -- sensitivities noted   - 1/31 Bcx (1 set sent) - aerobic bottle grew E. faecalis and MRSE - sensitivities pending  - UA with pyuria in both specimens sent 2/1  - follow Ucx for E. faecalis sensitivities (typically amp/vanc sensitive)   - follow Bcx for sensitivities   - follow 2/1 repeat Bcx -- NGTD x2   - TTE ordered to rule out vegetations -pending   - continue on vancomycin 750mg IV Q24h (renally dosed)   -- monitor vancomycin trough prior to 3rd dose, goal 15-20 or -600  - discontinue meropenem   - monitor temps/CBC    Fatigue/dyspnea likely due to severe anemia due to MDS s/p transfusions  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  H/o DLBCL, EBV+   - Pulmonary, Rheumatology and Heme/Onc following   - noted patient had similar presentation with bicytopenia and leukocytosis in the past, may need tx with rituximab   - s/p BMBx 1/23 - found with myelodysplastic syndrome    - quantiferon indeterminate - pt on steroids which can cause indeterminate results    - CT - s/p wedge resections in b/l upper lobes, 2 cm nodular opacity a/w staple line in RUL; multiple ill-defined b/l opacities more in RLL -unclear etiology, b/l effusions R>L   - continue on Bactrim DS 1 tablet daily for PCP ppx while on prednisone   - monitor CBC, transfusions as needed per primary team    L epistaxis   - ENT following, packing in place   - patient on vancomycin for GP coverage    Over the weekend Dr. Gio Tate will be covering for our group.  If you have any questions, concerns or new micro data, please reach out to them at [phone].    D/w Dr. Hugo Malave M.D.  [facility], Division of Infectious Diseases  [phone]  After 5pm on weekdays and all day on weekends - please call [phone]

## 2023-02-03 NOTE — PROGRESS NOTE ADULT - SUBJECTIVE AND OBJECTIVE BOX
Follow-up Pulm Progress Note    Covering Dr. Penn    O2 sats 100% on 40% humidified face tent  Denies SOB/CP    Medications:  MEDICATIONS  (STANDING):  budesonide 160 MICROgram(s)/formoterol 4.5 MICROgram(s) Inhaler 2 Puff(s) Inhalation two times a day  chlorhexidine 2% Cloths 1 Application(s) Topical daily  cholecalciferol 2000 Unit(s) Oral daily  citalopram 10 milliGRAM(s) Oral daily  fluticasone propionate 50 MICROgram(s)/spray Nasal Spray 1 Spray(s) Both Nostrils two times a day  folic acid 1 milliGRAM(s) Oral daily  furosemide   Injectable 20 milliGRAM(s) IV Push daily  guaiFENesin  milliGRAM(s) Oral every 12 hours  influenza  Vaccine (HIGH DOSE) 0.7 milliLiter(s) IntraMuscular once  ipratropium    for Nebulization 500 MICROGram(s) Nebulizer every 6 hours  lactobacillus acidophilus 1 Tablet(s) Oral daily  magnesium sulfate  IVPB 1 Gram(s) IV Intermittent once  meropenem  IVPB 1000 milliGRAM(s) IV Intermittent every 12 hours  metoprolol tartrate 25 milliGRAM(s) Oral two times a day  mirtazapine 7.5 milliGRAM(s) Oral daily  pantoprazole    Tablet 40 milliGRAM(s) Oral before breakfast  potassium chloride    Tablet ER 20 milliEquivalent(s) Oral once  predniSONE   Tablet 20 milliGRAM(s) Oral daily  senna 1 Tablet(s) Oral daily  sodium bicarbonate 650 milliGRAM(s) Oral three times a day  thiamine Injectable 100 milliGRAM(s) IV Push three times a day  thiamine IVPB 500 milliGRAM(s) IV Intermittent once  trimethoprim  160 mG/sulfamethoxazole 800 mG 1 Tablet(s) Oral daily  vancomycin  IVPB 750 milliGRAM(s) IV Intermittent every 24 hours    MEDICATIONS  (PRN):  acetaminophen     Tablet .. 650 milliGRAM(s) Oral every 6 hours PRN Temp greater or equal to 38C (100.4F), Mild Pain (1 - 3)  aluminum hydroxide/magnesium hydroxide/simethicone Suspension 30 milliLiter(s) Oral every 4 hours PRN Dyspepsia  melatonin 3 milliGRAM(s) Oral at bedtime PRN Insomnia  ondansetron Injectable 4 milliGRAM(s) IV Push every 8 hours PRN Nausea and/or Vomiting  polyethylene glycol 3350 17 Gram(s) Oral daily PRN Constipation          Vital Signs Last 24 Hrs  T(C): 36.3 (03 Feb 2023 12:44), Max: 36.3 (03 Feb 2023 04:35)  T(F): 97.4 (03 Feb 2023 12:44), Max: 97.4 (03 Feb 2023 12:44)  HR: 88 (03 Feb 2023 12:44) (85 - 94)  BP: 100/60 (03 Feb 2023 12:44) (100/60 - 144/84)  BP(mean): --  RR: 18 (03 Feb 2023 12:44) (18 - 18)  SpO2: 94% (03 Feb 2023 12:44) (94% - 100%)    Parameters below as of 03 Feb 2023 12:44  Patient On (Oxygen Delivery Method): face tent      LABS:                        12.0   49.00 )-----------( 26       ( 02 Feb 2023 16:52 )             34.1     02-02    136  |  100  |  34<H>  ----------------------------<  132<H>  3.4<L>   |  21<L>  |  0.87    Ca    8.9      02 Feb 2023 16:52  Mg     1.7     02-02    TPro  6.5  /  Alb  3.5  /  TBili  1.3<H>  /  DBili  x   /  AST  68<H>  /  ALT  14  /  AlkPhos  61  02-02          CAPILLARY BLOOD GLUCOSE    POCT Blood Glucose.: 145 mg/dL (01 Feb 2023 15:56)    CULTURES:     Culture - Blood (collected 02-01-23 @ 09:35)  Source: .Blood Blood-Peripheral  Preliminary Report (02-02-23 @ 13:03):    No growth to date.    Culture - Blood (collected 02-01-23 @ 09:20)  Source: .Blood Blood-Peripheral  Preliminary Report (02-02-23 @ 13:03):    No growth to date.    Culture - Blood (collected 01-31-23 @ 10:20)  Source: .Blood Blood-Peripheral  Gram Stain (02-03-23 @ 01:17):    Growth in aerobic bottle: Gram positive cocci in pairs    Growth in anaerobic bottle: Gram positive cocci in pairs  Preliminary Report (02-03-23 @ 01:18):    Growth in aerobic bottle: Enterococcus faecalis    Growth in anaerobic bottle: Gram positive cocci in pairs    ***Blood Panel PCR results on this specimen are available    approximately 3 hours after the Gram stain result.***    Gram stain, PCR, and/or culture results may not always    correspond due to difference in methodologies.    ************************************************************    This PCR assay was performed by multiplex PCR. This    Assay tests for 66 bacterial and resistance gene targets.    Please refer to the [facility] Labs test directory    at [email or web address] for details.  Organism: Blood Culture PCR  Enterococcus faecalis (02-03-23 @ 13:49)  Organism: Enterococcus faecalis (02-03-23 @ 13:49)      -  Ampicillin: S <=2 Predicts results to ampicillin/sulbactam, amoxacillin-clavulanate and  piperacillin-tazobactam.      -  Gentamicin synergy: S <=500      -  Streptomycin synergy: S <=1000      -  Vancomycin: S 2      Method Type: JIM  Organism: Blood Culture PCR (02-01-23 @ 08:58)      -  Enterococcus faecalis: Detec      -  Staphylococcus epidermidis, Methicillin resistant: Detec      Method Type: PCR    Culture - Blood (collected 01-30-23 @ 12:01)  Source: .Blood Blood-Peripheral  Gram Stain (02-01-23 @ 22:58):    Growth in aerobic bottle: Gram Positive Cocci in Clusters    Growth in anaerobic bottle: Gram Positive Cocci in Clusters  Final Report (02-02-23 @ 10:29):    Growth in aerobic and anaerobic bottles: Staphylococcus epidermidis    ***Blood Panel PCR results on this specimen are available    approximately 3 hours after the Gram stain result.***    Gram stain, PCR, and/or culture results may not always    correspond due to difference in methodologies.    ************************************************************    This PCR assay was performed by multiplex PCR. This    Assay tests for 66 bacterial and resistance gene targets.    Please refer to the [facility] Labs test directory    at [email or web address] for details.  Organism: Blood Culture PCR  Staphylococcus epidermidis (02-02-23 @ 10:29)  Organism: Staphylococcus epidermidis (02-02-23 @ 10:29)      -  Ampicillin/Sulbactam: R 16/8      -  Cefazolin: R >16      -  Clindamycin: R >4      -  Erythromycin: R >4      -  Gentamicin: S <=1 Should not be used as monotherapy      -  Oxacillin: R >2      -  Penicillin: R >8      -  Rifampin: S <=1 Should not be used as monotherapy      -  Tetracycline: S <=1      -  Trimethoprim/Sulfamethoxazole: R >2/38      -  Vancomycin: S 1      Method Type: JIM  Organism: Blood Culture PCR (02-02-23 @ 10:29)      -  Staphylococcus epidermidis, Methicillin resistant: Detec      Method Type: PCR        Culture - Urine (collected 02-01-23 @ 09:57)  Source: Clean Catch Clean Catch (Midstream)  Preliminary Report (02-02-23 @ 18:05):    >100,000 CFU/ml Enterococcus species    Culture - Urine (collected 02-01-23 @ 02:04)  Source: Clean Catch Clean Catch (Midstream)  Preliminary Report (02-02-23 @ 15:59):    >100,000 CFU/ml Enterococcus species    Physical Examination:  PULM: Decreased BS  CVS: S1, S2 heard    RADIOLOGY REVIEWED  CXR: bibasilar opacities

## 2023-02-03 NOTE — PROGRESS NOTE ADULT - SUBJECTIVE AND OBJECTIVE BOX
SUBJECTIVE/ OVERNIGHT EVENTS:  comfortable  awake alert  on IV Lasix  breathing better  on face mask  no cp, no sob, no n/v/d. no abdominal pain.  no headache, no dizziness.   no BM. no diarrhea.     --------------------------------------------------------------------------------------------  LABS:                        12.0   49.00 )-----------( 26       ( 02 Feb 2023 16:52 )             34.1     02-02    136  |  100  |  34<H>  ----------------------------<  132<H>  3.4<L>   |  21<L>  |  0.87    Ca    8.9      02 Feb 2023 16:52  Mg     1.7     02-02    TPro  6.5  /  Alb  3.5  /  TBili  1.3<H>  /  DBili  x   /  AST  68<H>  /  ALT  14  /  AlkPhos  61  02-02      CAPILLARY BLOOD GLUCOSE                RADIOLOGY & ADDITIONAL TESTS:    Imaging Personally Reviewed:  [x] YES  [ ] NO    Consultant(s) Notes Reviewed:  [x] YES  [ ] NO    MEDICATIONS  (STANDING):  budesonide 160 MICROgram(s)/formoterol 4.5 MICROgram(s) Inhaler 2 Puff(s) Inhalation two times a day  chlorhexidine 2% Cloths 1 Application(s) Topical daily  cholecalciferol 2000 Unit(s) Oral daily  citalopram 10 milliGRAM(s) Oral daily  fluticasone propionate 50 MICROgram(s)/spray Nasal Spray 1 Spray(s) Both Nostrils two times a day  folic acid 1 milliGRAM(s) Oral daily  furosemide   Injectable 20 milliGRAM(s) IV Push daily  guaiFENesin  milliGRAM(s) Oral every 12 hours  influenza  Vaccine (HIGH DOSE) 0.7 milliLiter(s) IntraMuscular once  ipratropium    for Nebulization 500 MICROGram(s) Nebulizer every 6 hours  lactobacillus acidophilus 1 Tablet(s) Oral daily  magnesium sulfate  IVPB 1 Gram(s) IV Intermittent once  meropenem  IVPB 1000 milliGRAM(s) IV Intermittent every 12 hours  metoprolol tartrate 25 milliGRAM(s) Oral two times a day  mirtazapine 7.5 milliGRAM(s) Oral daily  pantoprazole    Tablet 40 milliGRAM(s) Oral before breakfast  potassium chloride    Tablet ER 20 milliEquivalent(s) Oral once  predniSONE   Tablet 20 milliGRAM(s) Oral daily  senna 1 Tablet(s) Oral daily  sodium bicarbonate 650 milliGRAM(s) Oral three times a day  thiamine Injectable 100 milliGRAM(s) IV Push three times a day  thiamine IVPB 500 milliGRAM(s) IV Intermittent once  trimethoprim  160 mG/sulfamethoxazole 800 mG 1 Tablet(s) Oral daily  vancomycin  IVPB 750 milliGRAM(s) IV Intermittent every 24 hours    MEDICATIONS  (PRN):  acetaminophen     Tablet .. 650 milliGRAM(s) Oral every 6 hours PRN Temp greater or equal to 38C (100.4F), Mild Pain (1 - 3)  aluminum hydroxide/magnesium hydroxide/simethicone Suspension 30 milliLiter(s) Oral every 4 hours PRN Dyspepsia  melatonin 3 milliGRAM(s) Oral at bedtime PRN Insomnia  ondansetron Injectable 4 milliGRAM(s) IV Push every 8 hours PRN Nausea and/or Vomiting  polyethylene glycol 3350 17 Gram(s) Oral daily PRN Constipation      Care Discussed with Consultants/Other Providers [x] YES  [ ] NO    Vital Signs Last 24 Hrs  T(C): 36.3 (03 Feb 2023 04:35), Max: 36.3 (03 Feb 2023 04:35)  T(F): 97.3 (03 Feb 2023 04:35), Max: 97.3 (03 Feb 2023 04:35)  HR: 94 (03 Feb 2023 04:35) (85 - 94)  BP: 144/84 (03 Feb 2023 04:35) (142/81 - 146/84)  BP(mean): --  RR: 18 (03 Feb 2023 04:35) (18 - 18)  SpO2: 100% (03 Feb 2023 04:35) (96% - 100%)    Parameters below as of 03 Feb 2023 04:35  Patient On (Oxygen Delivery Method): face tent      I&O's Summary        PHYSICAL EXAM:  GENERAL: NAD, well-developed, comfortable on facemask    HEAD:  Atraumatic, Normocephalic  NOSE: left nostril nasal packing in place  EYES: EOMI, PERRLA, conjunctiva and sclera clear  NECK: Supple, No JVD  CHEST/LUNG: mild decrease breath sounds bilaterally; No wheeze   HEART: Regular rate and rhythm; No murmurs, rubs, or gallops  ABDOMEN: Soft, Nontender, Nondistended; Bowel sounds present  Neuro: awake alert, back to baseline mental status. no focal weakness  EXTREMITIES:  2+ Peripheral Pulses, No clubbing, cyanosis, trace b/l edema

## 2023-02-03 NOTE — PROGRESS NOTE ADULT - SUBJECTIVE AND OBJECTIVE BOX
[facility]ES  ANDREW Rodríguez Y. Patel, S. Shah, G. Casimir  [phone]  ([phone] - weekdays after 5pm and weekends)    Name: BETTIE NGUYEN  Age/Gender: 82y Female  MRN: [id number]    Interval History:  Patient seen and examined this morning.   Patient awake, remains on face tent.   States she feels fine, has no pain or complaints.   Notes reviewed. Afebrile.     Allergies: codeine (Nausea)  doxycycline (Nausea)  penicillin (Hives)    Objective:  Vitals:   T(F): 97.3 (02-03-23 @ 04:35), Max: 97.3 (02-03-23 @ 04:35)  HR: 94 (02-03-23 @ 04:35) (85 - 94)  BP: 144/84 (02-03-23 @ 04:35) (142/81 - 146/84)  RR: 18 (02-03-23 @ 04:35) (18 - 18)  SpO2: 100% (02-03-23 @ 04:35) (96% - 100%)  Physical Examination:  General: no acute distress, face tent   HEENT: NC/AT, EOMI, anicteric, neck supple  Cardio: S1, S2 present, normal rate  Resp: decreased breath sounds b/l  Abd: soft, nontender, nondistended, + BS  Neuro: AAOx3, no obvious focal deficits  Ext: no edema, no cyanosis, moving extremities  Skin: warm, dry, no visible rash, ecchymosis  Lines: L chest port accessed-no erythema/TTP    Laboratory Studies:  CBC:                       12.0   49.00 )-----------( 26       ( 02 Feb 2023 16:52 )             34.1     WBC Trend:  49.00 02-02-23 @ 16:52  55.63 02-02-23 @ 02:35  47.65 02-01-23 @ 18:31  57.56 02-01-23 @ 07:11  64.59 02-01-23 @ 01:06  54.99 01-31-23 @ 18:35  73.44 01-31-23 @ 10:44  74.20 01-31-23 @ 07:05  73.31 01-30-23 @ 12:20    CMP: 02-02    136  |  100  |  34<H>  ----------------------------<  132<H>  3.4<L>   |  21<L>  |  0.87    Ca    8.9      02 Feb 2023 16:52  Mg     1.7     02-02    TPro  6.5  /  Alb  3.5  /  TBili  1.3<H>  /  DBili  x   /  AST  68<H>  /  ALT  14  /  AlkPhos  61  02-02    Creatinine, Serum: 0.87 mg/dL (02-02-23 @ 16:52)  Creatinine, Serum: 1.02 mg/dL (02-01-23 @ 07:11)  Creatinine, Serum: 1.05 mg/dL (02-01-23 @ 01:06)  Creatinine, Serum: 1.43 mg/dL (01-31-23 @ 10:44)  Creatinine, Serum: 1.20 mg/dL (01-31-23 @ 07:05)  Creatinine, Serum: 1.05 mg/dL (01-30-23 @ 12:20)    LIVER FUNCTIONS - ( 02 Feb 2023 16:52 )  Alb: 3.5 g/dL / Pro: 6.5 g/dL / ALK PHOS: 61 U/L / ALT: 14 U/L / AST: 68 U/L / GGT: x           Microbiology: reviewed     Culture - Urine (collected 02-01-23 @ 09:57)  Source: Clean Catch Clean Catch (Midstream)  Preliminary Report (02-02-23 @ 18:05):    >100,000 CFU/ml Enterococcus species    Culture - Blood (collected 02-01-23 @ 09:35)  Source: .Blood Blood-Peripheral  Preliminary Report (02-02-23 @ 13:03):    No growth to date.    Culture - Blood (collected 02-01-23 @ 09:20)  Source: .Blood Blood-Peripheral  Preliminary Report (02-02-23 @ 13:03):    No growth to date.    Culture - Urine (collected 02-01-23 @ 02:04)  Source: Clean Catch Clean Catch (Midstream)  Preliminary Report (02-02-23 @ 15:59):    >100,000 CFU/ml Enterococcus species    Culture - Blood (collected 01-31-23 @ 10:20)  Source: .Blood Blood-Peripheral  Gram Stain (02-03-23 @ 01:17):    Growth in aerobic bottle: Gram positive cocci in pairs    Growth in anaerobic bottle: Gram positive cocci in pairs  Preliminary Report (02-03-23 @ 01:18):    Growth in aerobic bottle: Enterococcus faecalis    Growth in anaerobic bottle: Gram positive cocci in pairs    ***Blood Panel PCR results on this specimen are available    approximately 3 hours after the Gram stain result.***    Gram stain, PCR, and/or culture results may not always    correspond due to difference in methodologies.    ************************************************************    This PCR assay was performed by multiplex PCR. This    Assay tests for 66 bacterial and resistance gene targets.    Please refer to the [facility] [place] test directory    at [email or web address] for details.  Organism: Blood Culture PCR (02-01-23 @ 08:58)  Organism: Blood Culture PCR (02-01-23 @ 08:58)      -  Enterococcus faecalis: Detec      -  Staphylococcus epidermidis, Methicillin resistant: Detec      Method Type: PCR    Culture - Blood (collected 01-30-23 @ 12:01)  Source: .Blood Blood-Peripheral  Gram Stain (02-01-23 @ 22:58):    Growth in aerobic bottle: Gram Positive Cocci in Clusters    Growth in anaerobic bottle: Gram Positive Cocci in Clusters  Final Report (02-02-23 @ 10:29):    Growth in aerobic and anaerobic bottles: Staphylococcus epidermidis    ***Blood Panel PCR results on this specimen are available    approximately 3 hours after the Gram stain result.***    Gram stain, PCR, and/or culture results may not always    correspond due to difference in methodologies.    ************************************************************    This PCR assay was performed by multiplex PCR. This    Assay tests for 66 bacterial and resistance gene targets.    Please refer to the [facility] Labs test directory    at [email or web address] for details.  Organism: Blood Culture PCR  Staphylococcus epidermidis (02-02-23 @ 10:29)  Organism: Staphylococcus epidermidis (02-02-23 @ 10:29)      -  Ampicillin/Sulbactam: R 16/8      -  Cefazolin: R >16      -  Clindamycin: R >4      -  Erythromycin: R >4      -  Gentamicin: S <=1 Should not be used as monotherapy      -  Oxacillin: R >2      -  Penicillin: R >8      -  Rifampin: S <=1 Should not be used as monotherapy      -  Tetracycline: S <=1      -  Trimethoprim/Sulfamethoxazole: R >2/38      -  Vancomycin: S 1      Method Type: JIM  Organism: Blood Culture PCR (02-02-23 @ 10:29)      -  Staphylococcus epidermidis, Methicillin resistant: Detec      Method Type: PCR    Radiology: reviewed     Medications:  acetaminophen     Tablet .. 650 milliGRAM(s) Oral every 6 hours PRN  aluminum hydroxide/magnesium hydroxide/simethicone Suspension 30 milliLiter(s) Oral every 4 hours PRN  budesonide 160 MICROgram(s)/formoterol 4.5 MICROgram(s) Inhaler 2 Puff(s) Inhalation two times a day  chlorhexidine 2% Cloths 1 Application(s) Topical daily  cholecalciferol 2000 Unit(s) Oral daily  citalopram 10 milliGRAM(s) Oral daily  fluticasone propionate 50 MICROgram(s)/spray Nasal Spray 1 Spray(s) Both Nostrils two times a day  folic acid 1 milliGRAM(s) Oral daily  furosemide   Injectable 20 milliGRAM(s) IV Push daily  guaiFENesin  milliGRAM(s) Oral every 12 hours  influenza  Vaccine (HIGH DOSE) 0.7 milliLiter(s) IntraMuscular once  ipratropium    for Nebulization 500 MICROGram(s) Nebulizer every 6 hours  magnesium sulfate  IVPB 1 Gram(s) IV Intermittent once  melatonin 3 milliGRAM(s) Oral at bedtime PRN  meropenem  IVPB 1000 milliGRAM(s) IV Intermittent every 12 hours  metoprolol tartrate 25 milliGRAM(s) Oral two times a day  mirtazapine 7.5 milliGRAM(s) Oral daily  ondansetron Injectable 4 milliGRAM(s) IV Push every 8 hours PRN  pantoprazole    Tablet 40 milliGRAM(s) Oral before breakfast  polyethylene glycol 3350 17 Gram(s) Oral daily PRN  potassium chloride    Tablet ER 20 milliEquivalent(s) Oral once  predniSONE   Tablet 20 milliGRAM(s) Oral daily  senna 1 Tablet(s) Oral daily  sodium bicarbonate 650 milliGRAM(s) Oral three times a day  thiamine Injectable 100 milliGRAM(s) IV Push three times a day  thiamine IVPB 500 milliGRAM(s) IV Intermittent once  trimethoprim  160 mG/sulfamethoxazole 800 mG 1 Tablet(s) Oral daily  vancomycin  IVPB 750 milliGRAM(s) IV Intermittent every 24 hours    Current Antimicrobials:  meropenem  IVPB 1000 milliGRAM(s) IV Intermittent every 12 hours  trimethoprim  160 mG/sulfamethoxazole 800 mG 1 Tablet(s) Oral daily  vancomycin  IVPB 750 milliGRAM(s) IV Intermittent every 24 hours    Prior/Completed Antimicrobials:  cefepime   IVPB

## 2023-02-03 NOTE — PROGRESS NOTE ADULT - SUBJECTIVE AND OBJECTIVE BOX
CC: Left Epistaxis.    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 ENT was consulted for evaluation of Left Epistaxis controlled with RR, no further bleeding.  Pt with severe Anemia 2/2 MDS and low platelets.    PAST MEDICAL & SURGICAL HISTORY:  ANCA-associated vasculitis      Anxiety and depression      Pulmonary embolism  2021      DVT, lower extremity  2021      GERD (gastroesophageal reflux disease)      Hyperlipidemia      Lung mass  s/p left wedge resection      DLBCL (diffuse large B cell lymphoma)      History of appendectomy      Lung nodule  pt had wedge resection in 2021      Non-Hodgkins lymphoma  chemoport inserted in 2021        Allergies    codeine (Nausea)  doxycycline (Nausea)  penicillin (Hives)    Intolerances      MEDICATIONS  (STANDING):  budesonide 160 MICROgram(s)/formoterol 4.5 MICROgram(s) Inhaler 2 Puff(s) Inhalation two times a day  chlorhexidine 2% Cloths 1 Application(s) Topical daily  cholecalciferol 2000 Unit(s) Oral daily  citalopram 10 milliGRAM(s) Oral daily  fluticasone propionate 50 MICROgram(s)/spray Nasal Spray 1 Spray(s) Both Nostrils two times a day  folic acid 1 milliGRAM(s) Oral daily  furosemide   Injectable 20 milliGRAM(s) IV Push daily  guaiFENesin  milliGRAM(s) Oral every 12 hours  influenza  Vaccine (HIGH DOSE) 0.7 milliLiter(s) IntraMuscular once  ipratropium    for Nebulization 500 MICROGram(s) Nebulizer every 6 hours  magnesium sulfate  IVPB 1 Gram(s) IV Intermittent once  meropenem  IVPB 1000 milliGRAM(s) IV Intermittent every 12 hours  metoprolol tartrate 25 milliGRAM(s) Oral two times a day  mirtazapine 7.5 milliGRAM(s) Oral daily  pantoprazole    Tablet 40 milliGRAM(s) Oral before breakfast  potassium chloride    Tablet ER 20 milliEquivalent(s) Oral once  predniSONE   Tablet 20 milliGRAM(s) Oral daily  senna 1 Tablet(s) Oral daily  sodium bicarbonate 650 milliGRAM(s) Oral three times a day  trimethoprim  160 mG/sulfamethoxazole 800 mG 1 Tablet(s) Oral daily  vancomycin  IVPB 750 milliGRAM(s) IV Intermittent every 24 hours    MEDICATIONS  (PRN):  acetaminophen     Tablet .. 650 milliGRAM(s) Oral every 6 hours PRN Temp greater or equal to 38C (100.4F), Mild Pain (1 - 3)  aluminum hydroxide/magnesium hydroxide/simethicone Suspension 30 milliLiter(s) Oral every 4 hours PRN Dyspepsia  melatonin 3 milliGRAM(s) Oral at bedtime PRN Insomnia  ondansetron Injectable 4 milliGRAM(s) IV Push every 8 hours PRN Nausea and/or Vomiting  polyethylene glycol 3350 17 Gram(s) Oral daily PRN Constipation      Social History: see consult note    Family history: see consult note    ROS:   ENT: all negative except as noted in HPI   Pulm: denies SOB, cough, hemoptysis  Neuro: denies numbness/tingling, loss of sensation  Endo: denies heat/cold intolerance, excessive sweating      Vital Signs Last 24 Hrs  T(C): 36.3 (03 Feb 2023 04:35), Max: 36.3 (03 Feb 2023 04:35)  T(F): 97.3 (03 Feb 2023 04:35), Max: 97.3 (03 Feb 2023 04:35)  HR: 94 (03 Feb 2023 04:35) (85 - 94)  BP: 144/84 (03 Feb 2023 04:35) (142/81 - 146/84)  BP(mean): --  RR: 18 (03 Feb 2023 04:35) (18 - 18)  SpO2: 100% (03 Feb 2023 04:35) (96% - 100%)    Parameters below as of 03 Feb 2023 04:35  Patient On (Oxygen Delivery Method): face tent                              12.0   49.00 )-----------( 26       ( 02 Feb 2023 16:52 )             34.1    02-02    136  |  100  |  34<H>  ----------------------------<  132<H>  3.4<L>   |  21<L>  |  0.87    Ca    8.9      02 Feb 2023 16:52  Mg     1.7     02-02    TPro  6.5  /  Alb  3.5  /  TBili  1.3<H>  /  DBili  x   /  AST  68<H>  /  ALT  14  /  AlkPhos  61  02-02       PHYSICAL EXAM:  Gen: NAD  Skin: No rashes, bruises, or lesions  Head: Normocephalic, Atraumatic  Face: face tent in place, no edema, erythema, or fluctuance. Parotid glands soft without mass  Eyes: no scleral injection  Nose: RR in place in left nare, no active bleeding, Right nare patent  Mouth: No Stridor / Drooling / Trismus.  Mucosa moist, tongue/uvula midline, oropharynx clear, no active bleeding in posterior pharynx  Neck: Flat, supple, no lymphadenopathy, trachea midline, no masses  Lymphatic: No lymphadenopathy  Resp: breathing easily, no stridor  Neuro: facial nerve intact, no facial droop

## 2023-02-04 NOTE — PROGRESS NOTE ADULT - ASSESSMENT
83yo female seen for left epistaxis, controlled with rapid rhino nasal packing. Rapid Rhino removed at bedside today, no bleeding after removal.

## 2023-02-04 NOTE — PROGRESS NOTE ADULT - SUBJECTIVE AND OBJECTIVE BOX
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 ENT was consulted for evaluation of Left Epistaxis controlled with RR, no further bleeding.  Pt with severe Anemia 2/2 MDS and low platelets.        PAST MEDICAL & SURGICAL HISTORY:  ANCA-associated vasculitis      Anxiety and depression      Pulmonary embolism  2021      DVT, lower extremity  2021      GERD (gastroesophageal reflux disease)      Hyperlipidemia      Lung mass  s/p left wedge resection      DLBCL (diffuse large B cell lymphoma)      History of appendectomy      Lung nodule  pt had wedge resection in 2021      Non-Hodgkins lymphoma  chemoport inserted in 2021        Allergies    codeine (Nausea)  doxycycline (Nausea)  penicillin (Hives)    Intolerances      MEDICATIONS  (STANDING):  budesonide 160 MICROgram(s)/formoterol 4.5 MICROgram(s) Inhaler 2 Puff(s) Inhalation two times a day  chlorhexidine 2% Cloths 1 Application(s) Topical daily  cholecalciferol 2000 Unit(s) Oral daily  citalopram 10 milliGRAM(s) Oral daily  fluticasone propionate 50 MICROgram(s)/spray Nasal Spray 1 Spray(s) Both Nostrils two times a day  folic acid 1 milliGRAM(s) Oral daily  guaiFENesin  milliGRAM(s) Oral every 12 hours  influenza  Vaccine (HIGH DOSE) 0.7 milliLiter(s) IntraMuscular once  ipratropium    for Nebulization 500 MICROGram(s) Nebulizer every 6 hours  lactobacillus acidophilus 1 Tablet(s) Oral daily  metoprolol tartrate 25 milliGRAM(s) Oral two times a day  mirtazapine 7.5 milliGRAM(s) Oral daily  nitrofurantoin monohydrate/macrocrystals (MACROBID) 100 milliGRAM(s) Oral two times a day  pantoprazole    Tablet 40 milliGRAM(s) Oral before breakfast  predniSONE   Tablet 20 milliGRAM(s) Oral daily  senna 1 Tablet(s) Oral daily  sodium bicarbonate 650 milliGRAM(s) Oral three times a day  thiamine Injectable 100 milliGRAM(s) IV Push three times a day  trimethoprim  160 mG/sulfamethoxazole 800 mG 1 Tablet(s) Oral daily  vancomycin  IVPB 1000 milliGRAM(s) IV Intermittent every 24 hours    MEDICATIONS  (PRN):  acetaminophen     Tablet .. 650 milliGRAM(s) Oral every 6 hours PRN Temp greater or equal to 38C (100.4F), Mild Pain (1 - 3)  aluminum hydroxide/magnesium hydroxide/simethicone Suspension 30 milliLiter(s) Oral every 4 hours PRN Dyspepsia  melatonin 3 milliGRAM(s) Oral at bedtime PRN Insomnia  ondansetron Injectable 4 milliGRAM(s) IV Push every 8 hours PRN Nausea and/or Vomiting  polyethylene glycol 3350 17 Gram(s) Oral daily PRN Constipation      Social History: see consult note     Family history: see consult note     ROS:   ENT: all negative except as noted in HPI   Pulm: denies SOB, cough, hemoptysis  Neuro: denies numbness/tingling, loss of sensation  Endo: denies heat/cold intolerance, excessive sweating      Vital Signs Last 24 Hrs  T(C): 36.3 (04 Feb 2023 12:10), Max: 36.6 (04 Feb 2023 04:27)  T(F): 97.3 (04 Feb 2023 12:10), Max: 97.9 (04 Feb 2023 04:27)  HR: 67 (04 Feb 2023 12:10) (67 - 84)  BP: 127/63 (04 Feb 2023 12:10) (114/62 - 152/68)  BP(mean): --  RR: 18 (04 Feb 2023 12:10) (18 - 18)  SpO2: 96% (04 Feb 2023 12:10) (94% - 96%)    Parameters below as of 04 Feb 2023 12:10  Patient On (Oxygen Delivery Method): room air                              11.1   33.58 )-----------( 8        ( 04 Feb 2023 07:03 )             32.2    02-04    134<L>  |  99  |  36<H>  ----------------------------<  64<L>  3.6   |  19<L>  |  0.86    Ca    9.0      04 Feb 2023 07:03  Phos  2.4     02-04  Mg     2.3     02-04    TPro  6.5  /  Alb  3.5  /  TBili  1.3<H>  /  DBili  x   /  AST  68<H>  /  ALT  14  /  AlkPhos  61  02-02   PT/INR - ( 04 Feb 2023 07:03 )   PT: 13.8 sec;   INR: 1.20 ratio         PTT - ( 04 Feb 2023 07:03 )  PTT:32.4 sec    PHYSICAL EXAM:  Gen: NAD  Skin: No rashes, bruises, or lesions  Head: Normocephalic, Atraumatic  Face: no edema, erythema, or fluctuance. Parotid glands soft without mass  Eyes: no scleral injection  Nose: dried blood after rapid rhino removal   Mouth: No Stridor / Drooling / Trismus.  Mucosa moist, tongue/uvula midline, oropharynx clear  Neck: Flat, supple, no lymphadenopathy, trachea midline, no masses  Lymphatic: No lymphadenopathy  Resp: breathing easily, no stridor  Neuro: facial nerve intact, no facial droop

## 2023-02-04 NOTE — PROGRESS NOTE ADULT - SUBJECTIVE AND OBJECTIVE BOX
SUBJECTIVE/ OVERNIGHT EVENTS:  Pt seen and examined at bedside.   No overnight event.  Feeling better.   weaned off Oxygen mask  no cp, no sob, no n/v/d.   no BM yet.  last BM 2 days ago per pt  no further epistaxis. Nasal packing in place.     --------------------------------------------------------------------------------------------  LABS:                        12.0   49.00 )-----------( 26       ( 02 Feb 2023 16:52 )             34.1     02-02    136  |  100  |  34<H>  ----------------------------<  132<H>  3.4<L>   |  21<L>  |  0.87    Ca    8.9      02 Feb 2023 16:52  Mg     1.7     02-02    TPro  6.5  /  Alb  3.5  /  TBili  1.3<H>  /  DBili  x   /  AST  68<H>  /  ALT  14  /  AlkPhos  61  02-02      CAPILLARY BLOOD GLUCOSE                RADIOLOGY & ADDITIONAL TESTS:    Imaging Personally Reviewed:  [x] YES  [ ] NO    Consultant(s) Notes Reviewed:  [x] YES  [ ] NO    MEDICATIONS  (STANDING):  budesonide 160 MICROgram(s)/formoterol 4.5 MICROgram(s) Inhaler 2 Puff(s) Inhalation two times a day  chlorhexidine 2% Cloths 1 Application(s) Topical daily  cholecalciferol 2000 Unit(s) Oral daily  citalopram 10 milliGRAM(s) Oral daily  fluticasone propionate 50 MICROgram(s)/spray Nasal Spray 1 Spray(s) Both Nostrils two times a day  folic acid 1 milliGRAM(s) Oral daily  furosemide   Injectable 20 milliGRAM(s) IV Push daily  guaiFENesin  milliGRAM(s) Oral every 12 hours  influenza  Vaccine (HIGH DOSE) 0.7 milliLiter(s) IntraMuscular once  ipratropium    for Nebulization 500 MICROGram(s) Nebulizer every 6 hours  lactobacillus acidophilus 1 Tablet(s) Oral daily  metoprolol tartrate 25 milliGRAM(s) Oral two times a day  mirtazapine 7.5 milliGRAM(s) Oral daily  pantoprazole    Tablet 40 milliGRAM(s) Oral before breakfast  predniSONE   Tablet 20 milliGRAM(s) Oral daily  senna 1 Tablet(s) Oral daily  sodium bicarbonate 650 milliGRAM(s) Oral three times a day  thiamine Injectable 100 milliGRAM(s) IV Push three times a day  trimethoprim  160 mG/sulfamethoxazole 800 mG 1 Tablet(s) Oral daily  vancomycin  IVPB 750 milliGRAM(s) IV Intermittent every 24 hours    MEDICATIONS  (PRN):  acetaminophen     Tablet .. 650 milliGRAM(s) Oral every 6 hours PRN Temp greater or equal to 38C (100.4F), Mild Pain (1 - 3)  aluminum hydroxide/magnesium hydroxide/simethicone Suspension 30 milliLiter(s) Oral every 4 hours PRN Dyspepsia  melatonin 3 milliGRAM(s) Oral at bedtime PRN Insomnia  ondansetron Injectable 4 milliGRAM(s) IV Push every 8 hours PRN Nausea and/or Vomiting  polyethylene glycol 3350 17 Gram(s) Oral daily PRN Constipation      Care Discussed with Consultants/Other Providers [x] YES  [ ] NO    Vital Signs Last 24 Hrs  T(C): 36.6 (04 Feb 2023 04:27), Max: 36.6 (04 Feb 2023 04:27)  T(F): 97.9 (04 Feb 2023 04:27), Max: 97.9 (04 Feb 2023 04:27)  HR: 74 (04 Feb 2023 04:27) (73 - 88)  BP: 152/68 (04 Feb 2023 04:27) (100/60 - 152/68)  BP(mean): --  RR: 18 (04 Feb 2023 04:27) (18 - 18)  SpO2: 95% (04 Feb 2023 04:27) (94% - 95%)    Parameters below as of 04 Feb 2023 04:27  Patient On (Oxygen Delivery Method): room air      I&O's Summary    03 Feb 2023 07:01  -  04 Feb 2023 07:00  --------------------------------------------------------  IN: 600 mL / OUT: 500 mL / NET: 100 mL      PHYSICAL EXAM:  GENERAL: NAD, well-developed, comfortable on face mask    HEAD:  Atraumatic, Normocephalic  NOSE: left nostril nasal packing in place  EYES: EOMI, PERRLA, conjunctiva and sclera clear  NECK: Supple, No JVD  CHEST/LUNG: mild decrease breath sounds bilaterally; No wheeze   HEART: Regular rate and rhythm; No murmurs, rubs, or gallops  ABDOMEN: Soft, Nontender, Nondistended; Bowel sounds present  Neuro: awake alert, back to baseline mental status. no focal weakness  EXTREMITIES:  2+ Peripheral Pulses, No clubbing, cyanosis, trace b/l edema

## 2023-02-04 NOTE — PROGRESS NOTE ADULT - SUBJECTIVE AND OBJECTIVE BOX
[facility], Division of Infectious Diseases  ANDREW Rodríguez Y. Patel, S. Shah, G. Bebeto  [phone]  ([phone] - weekdays after 5pm and weekends)    Name: BETTIE NGUYEN  Age/Gender: 82y Female  MRN: [id number]    Interval History:  Patient seen this morning.   Notes reviewed.   No concerning overnight events.  Afebrile.   states she feels fine today  reports some throat irriatation    Allergies: codeine (Nausea)  doxycycline (Nausea)  penicillin (Hives)      Objective:  Vitals:   T(F): 97.9 (02-04-23 @ 04:27), Max: 97.9 (02-04-23 @ 04:27)  HR: 74 (02-04-23 @ 04:27) (73 - 88)  BP: 152/68 (02-04-23 @ 04:27) (100/60 - 152/68)  RR: 18 (02-04-23 @ 04:27) (18 - 18)  SpO2: 95% (02-04-23 @ 04:27) (94% - 95%)  Physical Examination:  General: no acute distress  HEENT: anicteric, nasal packing  Cardio: S1, S2, normal rate  Resp: clear to auscultation anteriorly  Abd: soft, NT, ND  Ext: no LE edema  Skin: warm, dry  Psych: appropriate affect and mood for situation    Laboratory Studies:  CBC:                       11.1   33.58 )-----------( 8        ( 04 Feb 2023 07:03 )             32.2     WBC Trend:  33.58 02-04-23 @ 07:03  49.00 02-02-23 @ 16:52  55.63 02-02-23 @ 02:35  47.65 02-01-23 @ 18:31  57.56 02-01-23 @ 07:11  64.59 02-01-23 @ 01:06  54.99 01-31-23 @ 18:35  73.44 01-31-23 @ 10:44  74.20 01-31-23 @ 07:05  73.31 01-30-23 @ 12:20    CMP: 02-02    136  |  100  |  34<H>  ----------------------------<  132<H>  3.4<L>   |  21<L>  |  0.87    Ca    8.9      02 Feb 2023 16:52  Mg     1.7     02-02    TPro  6.5  /  Alb  3.5  /  TBili  1.3<H>  /  DBili  x   /  AST  68<H>  /  ALT  14  /  AlkPhos  61  02-02      LIVER FUNCTIONS - ( 02 Feb 2023 16:52 )  Alb: 3.5 g/dL / Pro: 6.5 g/dL / ALK PHOS: 61 U/L / ALT: 14 U/L / AST: 68 U/L / GGT: x           Vancomycin Level, Trough: 9.5 ug/mL (02-03-23 @ 10:28)      Microbiology: reviewed     Culture - Urine (collected 02-01-23 @ 09:57)  Source: Clean Catch Clean Catch (Midstream)  Final Report (02-04-23 @ 07:54):    >100,000 CFU/ml Enterococcus faecium (vancomycin resistant)  Organism: Enterococcus faecium (vancomycin resistant) (02-04-23 @ 07:54)  Organism: Enterococcus faecium (vancomycin resistant) (02-04-23 @ 07:54)      -  Ampicillin: R >8 Predicts results to ampicillin/sulbactam, amoxacillin-clavulanate and  piperacillin-tazobactam.      -  Ciprofloxacin: R >2      -  Daptomycin: N/A >4      -  Levofloxacin: R >4      -  Linezolid: S 2      -  Nitrofurantoin: S <=32 Should not be used to treat pyelonephritis.      -  Tetracycline: R >8      -  Vancomycin: R >16      Method Type: JIM    Culture - Blood (collected 02-01-23 @ 09:35)  Source: .Blood Blood-Peripheral  Preliminary Report (02-02-23 @ 13:03):    No growth to date.    Culture - Blood (collected 02-01-23 @ 09:20)  Source: .Blood Blood-Peripheral  Preliminary Report (02-02-23 @ 13:03):    No growth to date.    Culture - Urine (collected 02-01-23 @ 02:04)  Source: Clean Catch Clean Catch (Midstream)  Final Report (02-03-23 @ 19:05):    >100,000 CFU/ml Enterococcus faecium (vancomycin resistant)  Organism: Enterococcus faecium (vancomycin resistant) (02-03-23 @ 19:05)  Organism: Enterococcus faecium (vancomycin resistant) (02-03-23 @ 19:05)      -  Ampicillin: R >8 Predicts results to ampicillin/sulbactam, amoxacillin-clavulanate and  piperacillin-tazobactam.      -  Ciprofloxacin: R >2      -  Daptomycin: N/A >4      -  Levofloxacin: R >4      -  Linezolid: S 2      -  Nitrofurantoin: S <=32 Should not be used to treat pyelonephritis.      -  Tetracycline: R >8      -  Vancomycin: R >16      Method Type: JIM    Culture - Blood (collected 01-31-23 @ 10:20)  Source: .Blood Blood-Peripheral  Gram Stain (02-03-23 @ 01:17):    Growth in aerobic bottle: Gram positive cocci in pairs    Growth in anaerobic bottle: Gram positive cocci in pairs  Preliminary Report (02-03-23 @ 18:08):    Growth in aerobic bottle: Enterococcus faecalis    Growth in anaerobic bottle: Staphylococcus epidermidis    ***Blood Panel PCR results on this specimen are available    approximately 3 hours after the Gram stain result.***    Gram stain, PCR, and/or culture results may not always    correspond due to difference in methodologies.    ************************************************************    This PCR assay was performed by multiplex PCR. This    Assay tests for 66 bacterial and resistance gene targets.    Please refer to the [facility] test directory    at [email or web address] for details.  Organism: Blood Culture PCR  Enterococcus faecalis (02-03-23 @ 13:49)  Organism: Enterococcus faecalis (02-03-23 @ 13:49)      -  Ampicillin: S <=2 Predicts results to ampicillin/sulbactam, amoxacillin-clavulanate and  piperacillin-tazobactam.      -  Gentamicin synergy: S <=500      -  Streptomycin synergy: S <=1000      -  Vancomycin: S 2      Method Type: JIM  Organism: Blood Culture PCR (02-01-23 @ 08:58)      -  Enterococcus faecalis: Detec      -  Staphylococcus epidermidis, Methicillin resistant: Detec      Method Type: PCR    Culture - Blood (collected 01-30-23 @ 12:01)  Source: .Blood Blood-Peripheral  Gram Stain (02-01-23 @ 22:58):    Growth in aerobic bottle: Gram Positive Cocci in Clusters    Growth in anaerobic bottle: Gram Positive Cocci in Clusters  Final Report (02-02-23 @ 10:29):    Growth in aerobic and anaerobic bottles: Staphylococcus epidermidis    ***Blood Panel PCR results on this specimen are available    approximately 3 hours after the Gram stain result.***    Gram stain, PCR, and/or culture results may not always    correspond due to difference in methodologies.    ************************************************************    This PCR assay was performed by multiplex PCR. This    Assay tests for 66 bacterial and resistance gene targets.    Please refer to the [facility] test directory    at [email or web address] for details.  Organism: Blood Culture PCR  Staphylococcus epidermidis (02-02-23 @ 10:29)  Organism: Staphylococcus epidermidis (02-02-23 @ 10:29)      -  Ampicillin/Sulbactam: R 16/8      -  Cefazolin: R >16      -  Clindamycin: R >4      -  Erythromycin: R >4      -  Gentamicin: S <=1 Should not be used as monotherapy      -  Oxacillin: R >2      -  Penicillin: R >8      -  Rifampin: S <=1 Should not be used as monotherapy      -  Tetracycline: S <=1      -  Trimethoprim/Sulfamethoxazole: R >2/38      -  Vancomycin: S 1      Method Type: JIM  Organism: Blood Culture PCR (02-02-23 @ 10:29)      -  Staphylococcus epidermidis, Methicillin resistant: Detec      Method Type: PCR        Radiology: reviewed     Medications:  acetaminophen     Tablet .. 650 milliGRAM(s) Oral every 6 hours PRN  aluminum hydroxide/magnesium hydroxide/simethicone Suspension 30 milliLiter(s) Oral every 4 hours PRN  budesonide 160 MICROgram(s)/formoterol 4.5 MICROgram(s) Inhaler 2 Puff(s) Inhalation two times a day  chlorhexidine 2% Cloths 1 Application(s) Topical daily  cholecalciferol 2000 Unit(s) Oral daily  citalopram 10 milliGRAM(s) Oral daily  fluticasone propionate 50 MICROgram(s)/spray Nasal Spray 1 Spray(s) Both Nostrils two times a day  folic acid 1 milliGRAM(s) Oral daily  guaiFENesin  milliGRAM(s) Oral every 12 hours  influenza  Vaccine (HIGH DOSE) 0.7 milliLiter(s) IntraMuscular once  ipratropium    for Nebulization 500 MICROGram(s) Nebulizer every 6 hours  lactobacillus acidophilus 1 Tablet(s) Oral daily  melatonin 3 milliGRAM(s) Oral at bedtime PRN  metoprolol tartrate 25 milliGRAM(s) Oral two times a day  mirtazapine 7.5 milliGRAM(s) Oral daily  ondansetron Injectable 4 milliGRAM(s) IV Push every 8 hours PRN  pantoprazole    Tablet 40 milliGRAM(s) Oral before breakfast  polyethylene glycol 3350 17 Gram(s) Oral daily PRN  predniSONE   Tablet 20 milliGRAM(s) Oral daily  senna 1 Tablet(s) Oral daily  sodium bicarbonate 650 milliGRAM(s) Oral three times a day  thiamine Injectable 100 milliGRAM(s) IV Push three times a day  trimethoprim  160 mG/sulfamethoxazole 800 mG 1 Tablet(s) Oral daily  vancomycin  IVPB 750 milliGRAM(s) IV Intermittent every 24 hours    Antimicrobials:  trimethoprim  160 mG/sulfamethoxazole 800 mG 1 Tablet(s) Oral daily  vancomycin  IVPB 750 milliGRAM(s) IV Intermittent every 24 hours

## 2023-02-04 NOTE — CHART NOTE - NSCHARTNOTEFT_GEN_A_CORE
Platelets 8 - 1 unit platelets today per discussion with Dr. Arias.  Follow up CBC in AM  Supportive transfusions for plts <10 if afebrile, <15 if febrile, 50 if bleeding, Tx for hg <7     [id number]

## 2023-02-04 NOTE — PROGRESS NOTE ADULT - NSPROGADDITIONALINFOA_GEN_ALL_CORE
d/w pt and PA.  d/w ID.   AM labs pending.   supplement electrolytes on IV Lasix.     - Dr. MARY Arias ([facility])  - [phone] d/w pt and PA.  d/w ID.   AM labs pending.   supplement electrolytes on IV Lasix.   respiratory status improving (received IV Lasix x 3 days, will temporarily hold and redose PRN).    - Dr. MARY Arias ([facility])  - [phone]

## 2023-02-04 NOTE — PROGRESS NOTE ADULT - ASSESSMENT
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 She presented here from [facility] for elevated WBC and low hemoglobin, severe weakness. Pt states for the past 2-3 days has been having increased weakness and lethargy, decreased appetite. +sputum productive cough. + cui, no sob, no difficulty breathing. No abdominal pain, nausea, vomiting, no bloody stools. Has endorsed multiple episodes of loose stools x weeks, currently being treated for c.diff with oral vancomycin.     Fatigue/dyspnea: due to severe anemia  - 2' MDS  - transfuse to keep Hgb above 7.0  - no melena, no hematochezia. no BRBPR. occult stool neg.   - she tends to require IV Lasix intermittently post transfusion.  - doubt GI bleed   - Physical therapy. Out of bed to chair with assistance.     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  - s/p Vanco PO a few days (started in rehab)  - diarrhea completely resolved.   - monitor off PO vanco per ID  - now constipated. PRN bowel regimen started. +BM    Fever, not neutropenic, starting 1/30/23 @ 5:05 AM  - RVP 1/30/23 (-)  - monitor blood cxs 1/30/23  - started empirically on cefepime 1/30/23, switched to merrem 1/31/23  - enterrocus bacteremia, abx per ID.     AMS  - unclear etiology, likely metabolic encephalopathy from ?Cefepime? received 3 doses, last dose 1/31/23  - CT head neg. sugars stable  - vanco IV added per ID.   - close monitoring   - monitor glucose (glucose 65 on BMP, but 95 on fingersticks)  - encourage PO intake   - mental status improving.     Pulmonary vasculitis   - Recent TTE on 11/3/22 reviewed: normal LV. outpt card Dr. Ady Cooper  - outpt pulm Mack Tucker   - s/p thoracoscopic biopsy of mediastinal lymph node and Lung wedge resection 11/10/22  - recent pleural effusion s/p 650 cc U/S-guided rt thoracentesis 11/17/22  - exudative but no neutrophilic predominance and initial Gram stain w/o organisms  - cultures neg.   - path results favoring vasculitis: no evidence for lymphoma. Cytology also came back negative.   - comfortable on nasal canula, better post diuretic last admission.   - rheum and heme-onc following previously, will reconsult.   - last admission, she was not started on immunosuppressants such as cellcept given recent treatment for COVID19 at that time  - c/w prednisone 20 mg qdaily.   - RTX under consideration --> acute hepatitis panel (-) and quantiferon indeterminate  - ID started PCP ppx with Bactrim.     hx of Tachycardia    - cont low-dose metoprolol, 50 mg to 25 mg dose reduced in rehab.   - card consult (Dr. Hooker) in the past, if needed    Anxiety and depression  - stable, continue citalopram and Remeron.  - Pt denied SI/HI ideations, denied visual and auditory hallucinations.     GERD (gastroesophageal reflux disease)  - continue PPI    Hyperlipidemia.   - continue home ezetimibe. okay to hold if nonformulary     DVT ppx   - holding AC in the setting of anemia, pancytopenia.   - venodyne boots LEs

## 2023-02-04 NOTE — CHART NOTE - NSCHARTNOTEFT_GEN_A_CORE
Reported critical lab result by RN - VRE in urine  Nitrofurantoin added by Dr. Arias  Will continue Vancomycin IV per ID for E. fecalis and Staph epidermidis bacteremia - per sensitivities    [id number]

## 2023-02-04 NOTE — PROGRESS NOTE ADULT - ASSESSMENT
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 She presented here from [facility] for elevated WBC and low hemoglobin, severe weakness. Pt states for the past 2-3 days has been having increased weakness and lethargy, decreased appetite. Reports chronic cough, currently nonproductive - RVP/COVID negative. Has multiple episodes of loose stools x weeks, reported recently trying marijuana for appetite and noted worsening. She was being treated empirically for c.diff with oral vancomycin but then became constipated, s/p bowel regimen and having normal bowel movements.   Patient initially being monitored off antibiotics given she was afebrile, WBC was stable and no infectious source was found. She had a fever 100.9F on 1/30 overnight with worsening leukocytosis and then 101.4F overnight 1/31 and noted with confusion. Repeat RVP/COVID negative.     Sepsis due to gram positive  bacteremia   E. faecalis bacteremia likely  source    Staph epi bacteremia ?skin vs port source vs contamination  AMS - neurotoxicity d/t cefepime vs infectious etiology --d/c cefepime 1/31  H/o PCN allergy with hives   - now afebrile >48h, WBC downtrended/stable, mental status improved  - L chest port accessed without erythema or TTP - no sign of infection  - repeat CXR with bibasilar hazy opacification - may be atelectasis or pneumonia   - 1/30 Bcx (1 set sent) -- both bottles grew Staph epi - MRSE -- sensitivities noted   - 1/31 Bcx (1 set sent) - aerobic bottle grew E. 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  H/o DLBCL, EBV+   - Pulmonary, Rheumatology and Heme/Onc following   - noted patient had similar presentation with bicytopenia and leukocytosis in the past, may need tx with rituximab   - s/p BMBx 1/23 - found with myelodysplastic syndrome    - quantiferon indeterminate - pt on steroids which can cause indeterminate results    - CT - s/p wedge resections in b/l upper lobes, 2 cm nodular opacity a/w staple line in RUL; multiple ill-defined b/l opacities more in RLL -unclear etiology, b/l effusions R>L   - continue on Bactrim DS 1 tablet daily for PCP ppx while on prednisone   - monitor CBC, transfusions as needed per primary team    L epistaxis   - ENT following, packing in place   - patient on vancomycin for GP coverage    Gio Tate M.D.  [facility], Division of Infectious Diseases  [phone]  After 5pm on weekdays and all day on weekends - please call [phone]  Patient is a 82 year old female with a PMH of diffuse large B cell lymphoma in remission, non-hodgkin's lymphoma (chemoport), depression, HLD, GERD, PE/DVT (4/2021 no longer on Eliquis), ANCA-vasculitis (20 y/a, no meds), s/p LVATS, LUIS wedge resection (2021), recent direct admit for RVATS, RUL Nodule Biopsy w/ IR marking in [facility], path favoring vasculitis, treated with Decadron, then switched to PO prednisone, went to [facility]. She presented here from [facility] for elevated WBC and low hemoglobin, severe weakness. Pt states for the past 2-3 days has been having increased weakness and lethargy, decreased appetite. Reports chronic cough, currently nonproductive - RVP/COVID negative. Has multiple episodes of loose stools x weeks, reported recently trying marijuana for appetite and noted worsening. She was being treated empirically for c.diff with oral vancomycin but then became constipated, s/p bowel regimen and having normal bowel movements.   Patient initially being monitored off antibiotics given she was afebrile, WBC was stable and no infectious source was found. She had a fever 100.9F on 1/30 overnight with worsening leukocytosis and then 101.4F overnight 1/31 and noted with confusion. Repeat RVP/COVID negative.     Sepsis due to gram positive  bacteremia   E. faecalis bacteremia likely  source    Staph epi bacteremia ?skin vs port source vs contamination  AMS - neurotoxicity d/t cefepime vs infectious etiology --d/c cefepime 1/31  H/o PCN allergy with hives   - now afebrile >48h, WBC downtrended/stable, mental status improved  - L chest port accessed without erythema or TTP - no sign of infection  - repeat CXR with bibasilar hazy opacification - may be atelectasis or pneumonia   - 1/30 Bcx (1 set sent) -- both bottles grew Staph epi - MRSE -- sensitivities noted   - 1/31 Bcx (1 set sent) - aerobic bottle grew E. 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  H/o DLBCL, EBV+   - Pulmonary, Rheumatology and Heme/Onc following   - noted patient had similar presentation with bicytopenia and leukocytosis in the past, may need tx with rituximab   - s/p BMBx 1/23 - found with myelodysplastic syndrome    - quantiferon indeterminate - pt on steroids which can cause indeterminate results    - CT - s/p wedge resections in b/l upper lobes, 2 cm nodular opacity a/w staple line in RUL; multiple ill-defined b/l opacities more in RLL -unclear etiology, b/l effusions R>L   - continue on Bactrim DS 1 tablet daily for PCP ppx while on prednisone   - monitor CBC, transfusions as needed per primary team    L epistaxis   - ENT following, packing in place   - patient on vancomycin for GP coverage    Gio Tate M.D.  [facility], Division of Infectious Diseases  [phone]  After 5pm on weekdays and all day on weekends - please call [phone]

## 2023-02-05 NOTE — PROGRESS NOTE ADULT - NSPROGADDITIONALINFOA_GEN_ALL_CORE
d/w pt and PA.  d/w ID.   supplement electrolytes on IV Lasix.   respiratory status improving (received IV Lasix x 3 days, will temporarily hold and redose PRN).    - Dr. MARY Arias ([facility])  - [phone]

## 2023-02-05 NOTE — PROGRESS NOTE ADULT - SUBJECTIVE AND OBJECTIVE BOX
SUBJECTIVE/ OVERNIGHT EVENTS:  nasal packing removed.  no cp, no sob, no n/v/d. no abdominal pain.  no headache, no dizziness.   on room air  still constipated.   Macrobid added for VRE.  plts a little better    --------------------------------------------------------------------------------------------  LABS:                        10.1   28.78 )-----------( 26       ( 05 Feb 2023 07:33 )             29.6     02-05    141  |  103  |  32<H>  ----------------------------<  72  3.1<L>   |  26  |  0.68    Ca    9.0      05 Feb 2023 07:30  Phos  2.2     02-05  Mg     2.3     02-04      PT/INR - ( 04 Feb 2023 07:03 )   PT: 13.8 sec;   INR: 1.20 ratio         PTT - ( 04 Feb 2023 07:03 )  PTT:32.4 sec  CAPILLARY BLOOD GLUCOSE                RADIOLOGY & ADDITIONAL TESTS:    Imaging Personally Reviewed:  [x] YES  [ ] NO    Consultant(s) Notes Reviewed:  [x] YES  [ ] NO    MEDICATIONS  (STANDING):  budesonide 160 MICROgram(s)/formoterol 4.5 MICROgram(s) Inhaler 2 Puff(s) Inhalation two times a day  chlorhexidine 2% Cloths 1 Application(s) Topical daily  cholecalciferol 2000 Unit(s) Oral daily  citalopram 10 milliGRAM(s) Oral daily  fluticasone propionate 50 MICROgram(s)/spray Nasal Spray 1 Spray(s) Both Nostrils two times a day  folic acid 1 milliGRAM(s) Oral daily  guaiFENesin  milliGRAM(s) Oral every 12 hours  influenza  Vaccine (HIGH DOSE) 0.7 milliLiter(s) IntraMuscular once  ipratropium    for Nebulization 500 MICROGram(s) Nebulizer every 6 hours  lactobacillus acidophilus 1 Tablet(s) Oral daily  metoprolol tartrate 25 milliGRAM(s) Oral two times a day  mirtazapine 7.5 milliGRAM(s) Oral daily  nitrofurantoin monohydrate/macrocrystals (MACROBID) 100 milliGRAM(s) Oral two times a day  pantoprazole    Tablet 40 milliGRAM(s) Oral before breakfast  potassium chloride   Powder 40 milliEquivalent(s) Oral every 4 hours  predniSONE   Tablet 20 milliGRAM(s) Oral daily  senna 1 Tablet(s) Oral daily  sodium bicarbonate 650 milliGRAM(s) Oral three times a day  sodium chloride 0.65% Nasal 2 Spray(s) Both Nostrils every 6 hours  thiamine Injectable 100 milliGRAM(s) IV Push three times a day  trimethoprim  160 mG/sulfamethoxazole 800 mG 1 Tablet(s) Oral daily  vancomycin  IVPB 1000 milliGRAM(s) IV Intermittent every 24 hours    MEDICATIONS  (PRN):  acetaminophen     Tablet .. 650 milliGRAM(s) Oral every 6 hours PRN Temp greater or equal to 38C (100.4F), Mild Pain (1 - 3)  aluminum hydroxide/magnesium hydroxide/simethicone Suspension 30 milliLiter(s) Oral every 4 hours PRN Dyspepsia  benzocaine/menthol Lozenge 1 Lozenge Oral every 8 hours PRN Sore Throat  melatonin 3 milliGRAM(s) Oral at bedtime PRN Insomnia  ondansetron Injectable 4 milliGRAM(s) IV Push every 8 hours PRN Nausea and/or Vomiting  polyethylene glycol 3350 17 Gram(s) Oral daily PRN Constipation      Care Discussed with Consultants/Other Providers [x] YES  [ ] NO    Vital Signs Last 24 Hrs  T(C): 36.7 (05 Feb 2023 20:30), Max: 36.8 (05 Feb 2023 08:26)  T(F): 98 (05 Feb 2023 20:30), Max: 98.2 (05 Feb 2023 08:26)  HR: 73 (05 Feb 2023 20:30) (68 - 81)  BP: 135/62 (05 Feb 2023 20:30) (120/70 - 159/78)  BP(mean): --  RR: 19 (05 Feb 2023 20:30) (18 - 19)  SpO2: 94% (05 Feb 2023 20:30) (94% - 95%)    Parameters below as of 05 Feb 2023 20:30  Patient On (Oxygen Delivery Method): room air      I&O's Summary    04 Feb 2023 07:01  -  05 Feb 2023 07:00  --------------------------------------------------------  IN: 626 mL / OUT: 800 mL / NET: -174 mL    05 Feb 2023 07:01  -  05 Feb 2023 23:24  --------------------------------------------------------  IN: 250 mL / OUT: 400 mL / NET: -150 mL            PHYSICAL EXAM:  GENERAL: NAD, well-developed, comfortable on room air  HEAD:  Atraumatic, Normocephalic  NOSE: left nostril nasal packing now removed.   EYES: EOMI, PERRLA, conjunctiva and sclera clear  NECK: Supple, No JVD  CHEST/LUNG: mild decrease breath sounds bilaterally; No wheeze   HEART: Regular rate and rhythm; No murmurs, rubs, or gallops  ABDOMEN: Soft, Nontender, Nondistended; Bowel sounds present  Neuro: awake alert, back to baseline mental status. no focal weakness  EXTREMITIES:  2+ Peripheral Pulses, No clubbing, cyanosis, trace b/l edema

## 2023-02-05 NOTE — PROGRESS NOTE ADULT - ASSESSMENT
Patient is a 82 year old female with a PMH of diffuse large B cell lymphoma in remission, non-hodgkin's lymphoma (chemoport), depression, HLD, GERD, PE/DVT (4/2021 no longer on Eliquis), 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 She presented here from [facility] for elevated WBC and low hemoglobin, severe weakness. Pt states for the past 2-3 days has been having increased weakness and lethargy, decreased appetite. Reports chronic cough, currently nonproductive - RVP/COVID negative. Has multiple episodes of loose stools x weeks, reported recently trying marijuana for appetite and noted worsening. She was being treated empirically for c.diff with oral vancomycin but then became constipated, s/p bowel regimen and having normal bowel movements.   Patient initially being monitored off antibiotics given she was afebrile, WBC was stable and no infectious source was found. She had a fever 100.9F on 1/30 overnight with worsening leukocytosis and then 101.4F overnight 1/31 and noted with confusion. Repeat RVP/COVID negative.     Sepsis due to gram positive  bacteremia   E. faecalis bacteremia likely  source    Staph epi bacteremia ?skin vs port source vs contamination  AMS - neurotoxicity d/t cefepime vs infectious etiology --d/c cefepime 1/31  H/o PCN allergy with hives   - now afebrile >48h, WBC downtrended/stable, mental status improved  - L chest port accessed without erythema or TTP - no sign of infection  - repeat CXR with bibasilar hazy opacification - may be atelectasis or pneumonia   - 1/30 Bcx (1 set sent) -- both bottles grew Staph epi - MRSE -- sensitivities noted   - 1/31 Bcx (1 set sent) - aerobic bottle grew E. 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  H/o DLBCL, EBV+   - Pulmonary, Rheumatology and Heme/Onc following   - noted patient had similar presentation with bicytopenia and leukocytosis in the past, may need tx with rituximab   - s/p BMBx 1/23 - found with myelodysplastic syndrome    - quantiferon indeterminate - pt on steroids which can cause indeterminate results    - CT - s/p wedge resections in b/l upper lobes, 2 cm nodular opacity a/w staple line in RUL; multiple ill-defined b/l opacities more in RLL -unclear etiology, b/l effusions R>L   - continue on Bactrim DS 1 tablet daily for PCP ppx while on prednisone   - monitor CBC    L epistaxis   - ENT following, packing in place   - patient on vancomycin for GP coverage    Gio Tate M.D.  [facility], Division of Infectious Diseases  [phone]  After 5pm on weekdays and all day on weekends - please call [phone]

## 2023-02-05 NOTE — PROGRESS NOTE ADULT - ASSESSMENT
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 She presented here from [facility] for elevated WBC and low hemoglobin, severe weakness. Pt states for the past 2-3 days has been having increased weakness and lethargy, decreased appetite. +sputum productive cough. + cui, no sob, no difficulty breathing. No abdominal pain, nausea, vomiting, no bloody stools. Has endorsed multiple episodes of loose stools x weeks, currently being treated for c.diff with oral vancomycin.     Fatigue/dyspnea: due to severe anemia  - 2' MDS  - transfuse to keep Hgb above 7.0  - no melena, no hematochezia. no BRBPR. occult stool neg.   - she tends to require IV Lasix intermittently post transfusion.  - doubt GI bleed   - Physical therapy. Out of bed to chair with assistance.     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  - s/p Vanco PO a few days (started in rehab)  - diarrhea completely resolved.   - monitor off PO vanco per ID  - now constipated. PRN bowel regimen started. +BM    Fever, not neutropenic, starting 1/30/23 @ 5:05 AM  - RVP 1/30/23 (-)  - monitor blood cxs 1/30/23  - started empirically on cefepime 1/30/23, switched to merrem 1/31/23  - enterrocus bacteremia, abx per ID.   - Macrobid added for VRE.    AMS  - unclear etiology, likely metabolic encephalopathy from ?Cefepime? received 3 doses, last dose 1/31/23  - CT head neg. sugars stable  - vanco IV added per ID.   - close monitoring   - monitor glucose (glucose 65 on BMP, but 95 on fingersticks)  - encourage PO intake   - mental status improving.     Pulmonary vasculitis   - Recent TTE on 11/3/22 reviewed: normal LV. outpt card Dr. Ady Cooper  - outpt pulm Mack Tucker   - s/p thoracoscopic biopsy of mediastinal lymph node and Lung wedge resection 11/10/22  - recent pleural effusion s/p 650 cc U/S-guided rt thoracentesis 11/17/22  - exudative but no neutrophilic predominance and initial Gram stain w/o organisms  - cultures neg.   - path results favoring vasculitis: no evidence for lymphoma. Cytology also came back negative.   - comfortable on nasal canula, better post diuretic last admission.   - rheum and heme-onc following previously, will reconsult.   - last admission, she was not started on immunosuppressants such as cellcept given recent treatment for COVID19 at that time  - c/w prednisone 20 mg qdaily.   - RTX under consideration --> acute hepatitis panel (-) and quantiferon indeterminate  - ID started PCP ppx with Bactrim.     hx of Tachycardia    - cont low-dose metoprolol, 50 mg to 25 mg dose reduced in rehab.   - card consult (Dr. Hooker) in the past, if needed    Anxiety and depression  - stable, continue citalopram and Remeron.  - Pt denied SI/HI ideations, denied visual and auditory hallucinations.     GERD (gastroesophageal reflux disease)  - continue PPI    Hyperlipidemia.   - continue home ezetimibe. okay to hold if nonformulary     DVT ppx   - holding AC in the setting of anemia, pancytopenia.   - venodyne boots LEs

## 2023-02-05 NOTE — PROGRESS NOTE ADULT - SUBJECTIVE AND OBJECTIVE BOX
[facility], Division of Infectious Diseases  ANDREW Rodríguez Y. Patel, S. Shah, G. Bebeto  [phone]  ([phone] - weekdays after 5pm and weekends)    Name: BETTIE NGUYEN  Age/Gender: 82y Female  MRN: [id number]    Interval History:  Patient seen this morning.   Notes reviewed.   No concerning overnight events.  Afebrile.   nasal packing removed yesterday  states she feels fine    Allergies: codeine (Nausea)  doxycycline (Nausea)  penicillin (Hives)      Objective:  Vitals:   T(F): 97.8 (02-05-23 @ 04:23), Max: 97.8 (02-05-23 @ 04:23)  HR: 75 (02-05-23 @ 04:23) (67 - 97)  BP: 150/82 (02-05-23 @ 04:23) (127/63 - 150/82)  RR: 18 (02-05-23 @ 04:23) (18 - 18)  SpO2: 94% (02-05-23 @ 04:23) (94% - 98%)  Physical Examination:  General: no acute distress  HEENT: anicteric  Cardio: S1, S2, normal rate  Resp: clear to auscultation anteriorly  Abd: soft, NT, ND  Ext: no LE edema  Skin: warm, dry    Laboratory Studies:  CBC:                       11.1   33.58 )-----------( 8        ( 04 Feb 2023 07:03 )             32.2     WBC Trend:  33.58 02-04-23 @ 07:03  49.00 02-02-23 @ 16:52  55.63 02-02-23 @ 02:35  47.65 02-01-23 @ 18:31  57.56 02-01-23 @ 07:11  64.59 02-01-23 @ 01:06  54.99 01-31-23 @ 18:35  73.44 01-31-23 @ 10:44  74.20 01-31-23 @ 07:05  73.31 01-30-23 @ 12:20    CMP: 02-04    134<L>  |  99  |  36<H>  ----------------------------<  64<L>  3.6   |  19<L>  |  0.86    Ca    9.0      04 Feb 2023 07:03  Phos  2.4     02-04  Mg     2.3     02-04          Vancomycin Level, Trough: 9.5 ug/mL (02-03-23 @ 10:28)      Microbiology: reviewed     Culture - Urine (collected 02-01-23 @ 09:57)  Source: Clean Catch Clean Catch (Midstream)  Final Report (02-04-23 @ 07:54):    >100,000 CFU/ml Enterococcus faecium (vancomycin resistant)  Organism: Enterococcus faecium (vancomycin resistant) (02-04-23 @ 07:54)  Organism: Enterococcus faecium (vancomycin resistant) (02-04-23 @ 07:54)      -  Ampicillin: R >8 Predicts results to ampicillin/sulbactam, amoxacillin-clavulanate and  piperacillin-tazobactam.      -  Ciprofloxacin: R >2      -  Daptomycin: N/A >4      -  Levofloxacin: R >4      -  Linezolid: S 2      -  Nitrofurantoin: S <=32 Should not be used to treat pyelonephritis.      -  Tetracycline: R >8      -  Vancomycin: R >16      Method Type: JIM    Culture - Blood (collected 02-01-23 @ 09:35)  Source: .Blood Blood-Peripheral  Preliminary Report (02-02-23 @ 13:03):    No growth to date.    Culture - Blood (collected 02-01-23 @ 09:20)  Source: .Blood Blood-Peripheral  Preliminary Report (02-02-23 @ 13:03):    No growth to date.    Culture - Urine (collected 02-01-23 @ 02:04)  Source: Clean Catch Clean Catch (Midstream)  Final Report (02-03-23 @ 19:05):    >100,000 CFU/ml Enterococcus faecium (vancomycin resistant)  Organism: Enterococcus faecium (vancomycin resistant) (02-03-23 @ 19:05)  Organism: Enterococcus faecium (vancomycin resistant) (02-03-23 @ 19:05)      -  Ampicillin: R >8 Predicts results to ampicillin/sulbactam, amoxacillin-clavulanate and  piperacillin-tazobactam.      -  Ciprofloxacin: R >2      -  Daptomycin: N/A >4      -  Levofloxacin: R >4      -  Linezolid: S 2      -  Nitrofurantoin: S <=32 Should not be used to treat pyelonephritis.      -  Tetracycline: R >8      -  Vancomycin: R >16      Method Type: JIM    Culture - Blood (collected 01-31-23 @ 10:20)  Source: .Blood Blood-Peripheral  Gram Stain (02-03-23 @ 01:17):    Growth in aerobic bottle: Gram positive cocci in pairs    Growth in anaerobic bottle: Gram positive cocci in pairs  Preliminary Report (02-03-23 @ 18:08):    Growth in aerobic bottle: Enterococcus faecalis    Growth in anaerobic bottle: Staphylococcus epidermidis    ***Blood Panel PCR results on this specimen are available    approximately 3 hours after the Gram stain result.***    Gram stain, PCR, and/or culture results may not always    correspond due to difference in methodologies.    ************************************************************    This PCR assay was performed by multiplex PCR. This    Assay tests for 66 bacterial and resistance gene targets.    Please refer to the [facility] test directory    at [email or web address] for details.  Organism: Blood Culture PCR  Enterococcus faecalis (02-03-23 @ 13:49)  Organism: Enterococcus faecalis (02-03-23 @ 13:49)      -  Ampicillin: S <=2 Predicts results to ampicillin/sulbactam, amoxacillin-clavulanate and  piperacillin-tazobactam.      -  Gentamicin synergy: S <=500      -  Streptomycin synergy: S <=1000      -  Vancomycin: S 2      Method Type: JIM  Organism: Blood Culture PCR (02-01-23 @ 08:58)      -  Enterococcus faecalis: Detec      -  Staphylococcus epidermidis, Methicillin resistant: Detec      Method Type: PCR    Culture - Blood (collected 01-30-23 @ 12:01)  Source: .Blood Blood-Peripheral  Gram Stain (02-01-23 @ 22:58):    Growth in aerobic bottle: Gram Positive Cocci in Clusters    Growth in anaerobic bottle: Gram Positive Cocci in Clusters  Final Report (02-02-23 @ 10:29):    Growth in aerobic and anaerobic bottles: Staphylococcus epidermidis    ***Blood Panel PCR results on this specimen are available    approximately 3 hours after the Gram stain result.***    Gram stain, PCR, and/or culture results may not always    correspond due to difference in methodologies.    ************************************************************    This PCR assay was performed by multiplex PCR. This    Assay tests for 66 bacterial and resistance gene targets.    Please refer to the [facility] test directory    at [email or web address] for details.  Organism: Blood Culture PCR  Staphylococcus epidermidis (02-02-23 @ 10:29)  Organism: Staphylococcus epidermidis (02-02-23 @ 10:29)      -  Ampicillin/Sulbactam: R 16/8      -  Cefazolin: R >16      -  Clindamycin: R >4      -  Erythromycin: R >4      -  Gentamicin: S <=1 Should not be used as monotherapy      -  Oxacillin: R >2      -  Penicillin: R >8      -  Rifampin: S <=1 Should not be used as monotherapy      -  Tetracycline: S <=1      -  Trimethoprim/Sulfamethoxazole: R >2/38      -  Vancomycin: S 1      Method Type: JIM  Organism: Blood Culture PCR (02-02-23 @ 10:29)      -  Staphylococcus epidermidis, Methicillin resistant: Detec      Method Type: PCR        Radiology: reviewed     Medications:  acetaminophen     Tablet .. 650 milliGRAM(s) Oral every 6 hours PRN  aluminum hydroxide/magnesium hydroxide/simethicone Suspension 30 milliLiter(s) Oral every 4 hours PRN  budesonide 160 MICROgram(s)/formoterol 4.5 MICROgram(s) Inhaler 2 Puff(s) Inhalation two times a day  chlorhexidine 2% Cloths 1 Application(s) Topical daily  cholecalciferol 2000 Unit(s) Oral daily  citalopram 10 milliGRAM(s) Oral daily  fluticasone propionate 50 MICROgram(s)/spray Nasal Spray 1 Spray(s) Both Nostrils two times a day  folic acid 1 milliGRAM(s) Oral daily  guaiFENesin  milliGRAM(s) Oral every 12 hours  influenza  Vaccine (HIGH DOSE) 0.7 milliLiter(s) IntraMuscular once  ipratropium    for Nebulization 500 MICROGram(s) Nebulizer every 6 hours  lactobacillus acidophilus 1 Tablet(s) Oral daily  melatonin 3 milliGRAM(s) Oral at bedtime PRN  metoprolol tartrate 25 milliGRAM(s) Oral two times a day  mirtazapine 7.5 milliGRAM(s) Oral daily  nitrofurantoin monohydrate/macrocrystals (MACROBID) 100 milliGRAM(s) Oral two times a day  ondansetron Injectable 4 milliGRAM(s) IV Push every 8 hours PRN  pantoprazole    Tablet 40 milliGRAM(s) Oral before breakfast  polyethylene glycol 3350 17 Gram(s) Oral daily PRN  predniSONE   Tablet 20 milliGRAM(s) Oral daily  senna 1 Tablet(s) Oral daily  sodium bicarbonate 650 milliGRAM(s) Oral three times a day  sodium chloride 0.65% Nasal 2 Spray(s) Both Nostrils every 6 hours  thiamine Injectable 100 milliGRAM(s) IV Push three times a day  trimethoprim  160 mG/sulfamethoxazole 800 mG 1 Tablet(s) Oral daily  vancomycin  IVPB 1000 milliGRAM(s) IV Intermittent every 24 hours    Antimicrobials:  nitrofurantoin monohydrate/macrocrystals (MACROBID) 100 milliGRAM(s) Oral two times a day  trimethoprim  160 mG/sulfamethoxazole 800 mG 1 Tablet(s) Oral daily  vancomycin  IVPB 1000 milliGRAM(s) IV Intermittent every 24 hours

## 2023-02-06 NOTE — PROGRESS NOTE ADULT - SUBJECTIVE AND OBJECTIVE BOX
[facility] DIVISION OF INFECTIOUS DISEASES  ANDREW Rodríguez Y. Patel, S. Shah, G. Casimir  [phone]  ([phone] - weekdays after 5pm and weekends)    Name: BETTIE NGUYEN  Age/Gender: 82y Female  MRN: [id number]    Interval History:  Patient seen and examined this morning.   States she feels good.  Denies pain or any new complaints.  Notes reviewed. Afebrile     Allergies: codeine (Nausea)  doxycycline (Nausea)  penicillin (Hives)      Objective:  Vitals:   T(F): 98.6 (02-06-23 @ 05:11), Max: 98.6 (02-06-23 @ 05:11)  HR: 81 (02-06-23 @ 05:11) (68 - 81)  BP: 137/78 (02-06-23 @ 05:11) (120/70 - 154/81)  RR: 18 (02-06-23 @ 05:11) (18 - 19)  SpO2: 96% (02-06-23 @ 05:11) (94% - 96%)  Physical Examination:  General: no acute distress, RA  HEENT: NC/AT, anicteric, neck supple  Respiratory: no acc muscle use, breathing comfortably  Cardiovascular: S1 and S2 present  Gastrointestinal: normal appearing, nondistended  Extremities: no edema, no cyanosis  Skin: no visible rash, ecchymosis,   L chest port without erythema/TTP    Laboratory Studies:  CBC:                       10.4   28.03 )-----------( 13       ( 06 Feb 2023 06:58 )             31.2     WBC Trend:  28.03 02-06-23 @ 06:58  28.78 02-05-23 @ 07:33  33.58 02-04-23 @ 07:03  49.00 02-02-23 @ 16:52  55.63 02-02-23 @ 02:35  47.65 02-01-23 @ 18:31  57.56 02-01-23 @ 07:11  64.59 02-01-23 @ 01:06  54.99 01-31-23 @ 18:35  73.44 01-31-23 @ 10:44  74.20 01-31-23 @ 07:05  73.31 01-30-23 @ 12:20    CMP: 02-06    139  |  104  |  20  ----------------------------<  74  3.7   |  22  |  0.59    Ca    9.0      06 Feb 2023 06:55  Phos  2.8     02-06      Creatinine, Serum: 0.59 mg/dL (02-06-23 @ 06:55)  Creatinine, Serum: 0.68 mg/dL (02-05-23 @ 07:30)  Creatinine, Serum: 0.86 mg/dL (02-04-23 @ 07:03)  Creatinine, Serum: 0.87 mg/dL (02-02-23 @ 16:52)  Creatinine, Serum: 1.02 mg/dL (02-01-23 @ 07:11)  Creatinine, Serum: 1.05 mg/dL (02-01-23 @ 01:06)  Creatinine, Serum: 1.43 mg/dL (01-31-23 @ 10:44)  Creatinine, Serum: 1.20 mg/dL (01-31-23 @ 07:05)  Creatinine, Serum: 1.05 mg/dL (01-30-23 @ 12:20)    Vancomycin Level, Trough: 9.5 ug/mL (02-03-23 @ 10:28)    Microbiology: reviewed   Culture - Urine (collected 02-01-23 @ 09:57)  Source: Clean Catch Clean Catch (Midstream)  Final Report (02-04-23 @ 07:54):    >100,000 CFU/ml Enterococcus faecium (vancomycin resistant)  Organism: Enterococcus faecium (vancomycin resistant) (02-04-23 @ 07:54)  Organism: Enterococcus faecium (vancomycin resistant) (02-04-23 @ 07:54)      -  Ampicillin: R >8 Predicts results to ampicillin/sulbactam, amoxacillin-clavulanate and  piperacillin-tazobactam.      -  Ciprofloxacin: R >2      -  Daptomycin: N/A >4      -  Levofloxacin: R >4      -  Linezolid: S 2      -  Nitrofurantoin: S <=32 Should not be used to treat pyelonephritis.      -  Tetracycline: R >8      -  Vancomycin: R >16      Method Type: JIM    Culture - Blood (collected 02-01-23 @ 09:35)  Source: .Blood Blood-Peripheral  Preliminary Report (02-02-23 @ 13:03):    No growth to date.    Culture - Blood (collected 02-01-23 @ 09:20)  Source: .Blood Blood-Peripheral  Preliminary Report (02-02-23 @ 13:03):    No growth to date.    Culture - Urine (collected 02-01-23 @ 02:04)  Source: Clean Catch Clean Catch (Midstream)  Final Report (02-03-23 @ 19:05):    >100,000 CFU/ml Enterococcus faecium (vancomycin resistant)  Organism: Enterococcus faecium (vancomycin resistant) (02-03-23 @ 19:05)  Organism: Enterococcus faecium (vancomycin resistant) (02-03-23 @ 19:05)      -  Ampicillin: R >8 Predicts results to ampicillin/sulbactam, amoxacillin-clavulanate and  piperacillin-tazobactam.      -  Ciprofloxacin: R >2      -  Daptomycin: N/A >4      -  Levofloxacin: R >4      -  Linezolid: S 2      -  Nitrofurantoin: S <=32 Should not be used to treat pyelonephritis.      -  Tetracycline: R >8      -  Vancomycin: R >16      Method Type: JIM    Culture - Blood (collected 01-31-23 @ 10:20)  Source: .Blood Blood-Peripheral  Gram Stain (02-03-23 @ 01:17):    Growth in aerobic bottle: Gram positive cocci in pairs    Growth in anaerobic bottle: Gram positive cocci in pairs  Preliminary Report (02-03-23 @ 18:08):    Growth in aerobic bottle: Enterococcus faecalis    Growth in anaerobic bottle: Staphylococcus epidermidis    ***Blood Panel PCR results on this specimen are available    approximately 3 hours after the Gram stain result.***    Gram stain, PCR, and/or culture results may not always    correspond due to difference in methodologies.    ************************************************************    This PCR assay was performed by multiplex PCR. This    Assay tests for 66 bacterial and resistance gene targets.    Please refer to the [facility] test directory    at [email or web address] for details.  Organism: Blood Culture PCR  Enterococcus faecalis (02-03-23 @ 13:49)  Organism: Enterococcus faecalis (02-03-23 @ 13:49)      -  Ampicillin: S <=2 Predicts results to ampicillin/sulbactam, amoxacillin-clavulanate and  piperacillin-tazobactam.      -  Gentamicin synergy: S <=500      -  Streptomycin synergy: S <=1000      -  Vancomycin: S 2      Method Type: JIM  Organism: Blood Culture PCR (02-01-23 @ 08:58)      -  Enterococcus faecalis: Detec      -  Staphylococcus epidermidis, Methicillin resistant: Detec      Method Type: PCR    Culture - Blood (collected 01-30-23 @ 12:01)  Source: .Blood Blood-Peripheral  Gram Stain (02-01-23 @ 22:58):    Growth in aerobic bottle: Gram Positive Cocci in Clusters    Growth in anaerobic bottle: Gram Positive Cocci in Clusters  Final Report (02-02-23 @ 10:29):    Growth in aerobic and anaerobic bottles: Staphylococcus epidermidis    ***Blood Panel PCR results on this specimen are available    approximately 3 hours after the Gram stain result.***    Gram stain, PCR, and/or culture results may not always    correspond due to difference in methodologies.    ************************************************************    This PCR assay was performed by multiplex PCR. This    Assay tests for 66 bacterial and resistance gene targets.    Please refer to the [facility] test directory    at [email or web address] for details.  Organism: Blood Culture PCR  Staphylococcus epidermidis (02-02-23 @ 10:29)  Organism: Staphylococcus epidermidis (02-02-23 @ 10:29)      -  Ampicillin/Sulbactam: R 16/8      -  Cefazolin: R >16      -  Clindamycin: R >4      -  Erythromycin: R >4      -  Gentamicin: S <=1 Should not be used as monotherapy      -  Oxacillin: R >2      -  Penicillin: R >8      -  Rifampin: S <=1 Should not be used as monotherapy      -  Tetracycline: S <=1      -  Trimethoprim/Sulfamethoxazole: R >2/38      -  Vancomycin: S 1      Method Type: JIM  Organism: Blood Culture PCR (02-02-23 @ 10:29)      -  Staphylococcus epidermidis, Methicillin resistant: Detec      Method Type: PCR    Radiology: reviewed   Xray Abdomen 1 View PORTABLE -Routine (Xray Abdomen 1 View PORTABLE -Routine .) (02.03.23 @ 15:26) >IMPRESSION: Nonobstructive bowel gas pattern. Moderate stool burden.    Medications:  acetaminophen     Tablet .. 650 milliGRAM(s) Oral every 6 hours PRN  aluminum hydroxide/magnesium hydroxide/simethicone Suspension 30 milliLiter(s) Oral every 4 hours PRN  benzocaine/menthol Lozenge 1 Lozenge Oral every 8 hours PRN  budesonide 160 MICROgram(s)/formoterol 4.5 MICROgram(s) Inhaler 2 Puff(s) Inhalation two times a day  chlorhexidine 2% Cloths 1 Application(s) Topical daily  cholecalciferol 2000 Unit(s) Oral daily  citalopram 10 milliGRAM(s) Oral daily  fluticasone propionate 50 MICROgram(s)/spray Nasal Spray 1 Spray(s) Both Nostrils two times a day  folic acid 1 milliGRAM(s) Oral daily  guaiFENesin  milliGRAM(s) Oral every 12 hours  influenza  Vaccine (HIGH DOSE) 0.7 milliLiter(s) IntraMuscular once  ipratropium    for Nebulization 500 MICROGram(s) Nebulizer every 6 hours  lactobacillus acidophilus 1 Tablet(s) Oral daily  melatonin 3 milliGRAM(s) Oral at bedtime PRN  metoprolol tartrate 25 milliGRAM(s) Oral two times a day  mirtazapine 7.5 milliGRAM(s) Oral daily  nitrofurantoin monohydrate/macrocrystals (MACROBID) 100 milliGRAM(s) Oral two times a day  ondansetron Injectable 4 milliGRAM(s) IV Push every 8 hours PRN  pantoprazole    Tablet 40 milliGRAM(s) Oral before breakfast  polyethylene glycol 3350 17 Gram(s) Oral daily PRN  predniSONE   Tablet 20 milliGRAM(s) Oral daily  senna 1 Tablet(s) Oral daily  sodium bicarbonate 650 milliGRAM(s) Oral three times a day  sodium chloride 0.65% Nasal 2 Spray(s) Both Nostrils every 6 hours  thiamine Injectable 100 milliGRAM(s) IV Push three times a day  trimethoprim  160 mG/sulfamethoxazole 800 mG 1 Tablet(s) Oral daily  vancomycin  IVPB 1000 milliGRAM(s) IV Intermittent every 24 hours    Current Antimicrobials:  nitrofurantoin monohydrate/macrocrystals (MACROBID) 100 milliGRAM(s) Oral two times a day  trimethoprim  160 mG/sulfamethoxazole 800 mG 1 Tablet(s) Oral daily  vancomycin  IVPB 1000 milliGRAM(s) IV Intermittent every 24 hours    Prior/Completed Antimicrobials:  cefepime   IVPB

## 2023-02-06 NOTE — CONSULT NOTE ADULT - ASSESSMENT
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 She presented here from [facility] for elevated WBC and low hemoglobin, severe weakness. Pt states for the past 2-3 days has been having increased weakness and lethargy, decreased appetite. +sputum productive cough. + cui, no sob, no difficulty breathing. No abdominal pain, nausea, vomiting, no bloody stools. Has endorsed multiple episodes of loose stools x weeks, currently being treated for c.diff with oral vancomycin. Nephrology consulted for Hyponatremia.    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 She presented here from [facility] for elevated WBC and low hemoglobin, severe weakness. Pt states for the past 2-3 days has been having increased weakness and lethargy, decreased appetite. +sputum productive cough. + cui, no sob, no difficulty breathing. No abdominal pain, nausea, vomiting, no bloody stools. Has endorsed multiple episodes of loose stools x weeks, currently being treated for c.diff with oral vancomycin. Nephrology consulted for Hyponatremia.       A/P     HYponatremia   likely 2/2 dehydration / hypotonic solution   got vanco in D5   avoid hypotonic solution   lasix on hold Feb 4  patient is not eating and drinking   clinically dehydrated  suggest to give sodium bicarb 75cc/hr x1L today   suggest to get urine Na, urine osmol   Avoid overcorrection >6-8meq a day   monitor Na closely     Acidosis without anion gap   patient had diarrhea   suggest to give Sodium bicarb 75cc/hr x 1L today   monitor     HTN   stable   monitor BP closely     Anemia   heme/onc on board   PRBC as needed   monitor H/H

## 2023-02-06 NOTE — CONSULT NOTE ADULT - SUBJECTIVE AND OBJECTIVE BOX
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 She presented here from [facility] for elevated WBC and low hemoglobin, severe weakness. Pt states for the past 2-3 days has been having increased weakness and lethargy, decreased appetite. +sputum productive cough. + cui, no sob, no difficulty breathing. No abdominal pain, nausea, vomiting, no bloody stools. Has endorsed multiple episodes of loose stools x weeks, currently being treated for c.diff with oral vancomycin. Nephrology consulted for Hyponatremia.         Allergies:  codeine (Nausea)  doxycycline (Nausea)  penicillin (Hives)      PAST MEDICAL & SURGICAL HISTORY:  ANCA-associated vasculitis      Anxiety and depression      Pulmonary embolism        DVT, lower extremity        GERD (gastroesophageal reflux disease)      Hyperlipidemia      Lung mass  s/p left wedge resection      DLBCL (diffuse large B cell lymphoma)      History of appendectomy      Lung nodule  pt had wedge resection in       Non-Hodgkins lymphoma  chemoport inserted in           Home Medications Reviewed    Hospital Medications:   MEDICATIONS  (STANDING):  budesonide 160 MICROgram(s)/formoterol 4.5 MICROgram(s) Inhaler 2 Puff(s) Inhalation two times a day  chlorhexidine 2% Cloths 1 Application(s) Topical daily  cholecalciferol 2000 Unit(s) Oral daily  citalopram 10 milliGRAM(s) Oral daily  fluticasone propionate 50 MICROgram(s)/spray Nasal Spray 1 Spray(s) Both Nostrils two times a day  folic acid 1 milliGRAM(s) Oral daily  guaiFENesin  milliGRAM(s) Oral every 12 hours  influenza  Vaccine (HIGH DOSE) 0.7 milliLiter(s) IntraMuscular once  ipratropium    for Nebulization 500 MICROGram(s) Nebulizer every 6 hours  lactobacillus acidophilus 1 Tablet(s) Oral daily  metoprolol tartrate 25 milliGRAM(s) Oral two times a day  mirtazapine 7.5 milliGRAM(s) Oral daily  nitrofurantoin monohydrate/macrocrystals (MACROBID) 100 milliGRAM(s) Oral two times a day  pantoprazole    Tablet 40 milliGRAM(s) Oral before breakfast  predniSONE   Tablet 20 milliGRAM(s) Oral daily  senna 1 Tablet(s) Oral daily  sodium bicarbonate 650 milliGRAM(s) Oral three times a day  sodium chloride 0.65% Nasal 2 Spray(s) Both Nostrils every 6 hours  thiamine Injectable 100 milliGRAM(s) IV Push three times a day  trimethoprim  160 mG/sulfamethoxazole 800 mG 1 Tablet(s) Oral daily      SOCIAL HISTORY:  Denies ETOh, Smoking,     FAMILY HISTORY:  FH: lung cancer (Sibling)    FH: CAD (coronary artery disease) (Sibling)        REVIEW OF SYSTEMS:  CONSTITUTIONAL: No weakness, fevers or chills  EYES/ENT: No visual changes;  No vertigo or throat pain   NECK: No pain or stiffness  RESPIRATORY: No cough, wheezing, hemoptysis; No shortness of breath  CARDIOVASCULAR: No chest pain or palpitations.  GASTROINTESTINAL: No abdominal or epigastric pain. No nausea, vomiting, or hematemesis; No diarrhea or constipation. No melena or hematochezia.  GENITOURINARY: No dysuria, frequency, foamy urine, urinary urgency, incontinence or hematuria  NEUROLOGICAL: No numbness or weakness  SKIN: No itching, burning, rashes, or lesions   VASCULAR: No bilateral lower extremity edema.   All other review of systems is negative unless indicated above.    VITALS:  T(F): 98.4 (23 @ 12:45), Max: 98.6 (23 @ 05:11)  HR: 76 (23 @ 12:45)  BP: 111/69 (23 @ 12:45)  RR: 18 (23 @ 12:45)  SpO2: 95% (23 @ 12:45)  Wt(kg): --     @ 07:01  -   @ 07:00  --------------------------------------------------------  IN: 250 mL / OUT: 800 mL / NET: -550 mL          PHYSICAL EXAM:  Constitutional: NAD  HEENT: anicteric sclera, oropharynx clear, MMM  Neck: No JVD  Respiratory: CTAB, no wheezes, rales or rhonchi  Cardiovascular: S1, S2, RRR  Gastrointestinal: BS+, soft, NT/ND  Extremities: No cyanosis or clubbing. No peripheral edema  Neurological: A/O x 3, no focal deficits  Psychiatric: Normal mood, normal affect  : No CVA tenderness. No lopez.   Skin: No rashes  Vascular Access:    LABS:      127<L>  |  95<L>  |  22  ----------------------------<  401<H>  4.5   |  18<L>  |  0.60    Ca    8.4      2023 12:38  Phos  2.8         TPro  6.1  /  Alb  3.4  /  TBili  0.7  /  DBili  0.2  /  AST  24  /  ALT  8<L>  /  AlkPhos  69      Creatinine Trend: 0.60 <--, 0.59 <--, 0.68 <--, 0.86 <--, 0.87 <--, 1.02 <--, 1.05 <--, 1.43 <--, 1.20 <--                        10.4   28.03 )-----------( 13       ( 2023 06:58 )             31.2     Urine Studies:  Urinalysis Basic - ( 2023 09:57 )    Color: Yellow / Appearance: Slightly Turbid / S.033 / pH:   Gluc:  / Ketone: Trace  / Bili: Negative / Urobili: Negative   Blood:  / Protein: 300 mg/dL / Nitrite: Negative   Leuk Esterase: Negative / RBC: 4 /hpf /  /HPF   Sq Epi:  / Non Sq Epi: 10 /hpf / Bacteria: Moderate          RADIOLOGY & ADDITIONAL STUDIES:                 [facility] [place] [facility]   MD RONEL FRIAS MD, DO ANGELA WONG, PA INJUNG KO NP    TEL:  OFFICE: [phone]    From 5pm-7am answering service [phone]    --- INITIAL RENAL CONSULT NOTE ---date of service 23 @ 16:13    HPI:  82 yr old female with a PMHx of diffuse large B cell lymphoma in remission, non-hodgkin's lymphoma (chemoport), depression, HLD, GERD, PE/DVT (2021 no longer on Eliquis), ANCA-vasculitis (20 y/a, no meds), s/p LVATS, LUIS wedge resection (), recent direct admit for RVATS, RUL Nodule Biopsy w/ IR marking in [facility], path favoring vasculitis, treated with Decadron, then switched to PO prednisone, went to [facility]. She presented here from [facility] for elevated WBC and low hemoglobin, severe weakness. Pt states for the past 2-3 days has been having increased weakness and lethargy, decreased appetite. +sputum productive cough. + cui, no sob, no difficulty breathing. No abdominal pain, nausea, vomiting, no bloody stools. Has endorsed multiple episodes of loose stools x weeks, currently being treated for c.diff with oral vancomycin. Nephrology consulted for Hyponatremia.         Allergies:  codeine (Nausea)  doxycycline (Nausea)  penicillin (Hives)      PAST MEDICAL & SURGICAL HISTORY:  ANCA-associated vasculitis      Anxiety and depression      Pulmonary embolism        DVT, lower extremity        GERD (gastroesophageal reflux disease)      Hyperlipidemia      Lung mass  s/p left wedge resection      DLBCL (diffuse large B cell lymphoma)      History of appendectomy      Lung nodule  pt had wedge resection in       Non-Hodgkins lymphoma  chemoport inserted in           Home Medications Reviewed    Hospital Medications:   MEDICATIONS  (STANDING):  budesonide 160 MICROgram(s)/formoterol 4.5 MICROgram(s) Inhaler 2 Puff(s) Inhalation two times a day  chlorhexidine 2% Cloths 1 Application(s) Topical daily  cholecalciferol 2000 Unit(s) Oral daily  citalopram 10 milliGRAM(s) Oral daily  fluticasone propionate 50 MICROgram(s)/spray Nasal Spray 1 Spray(s) Both Nostrils two times a day  folic acid 1 milliGRAM(s) Oral daily  guaiFENesin  milliGRAM(s) Oral every 12 hours  influenza  Vaccine (HIGH DOSE) 0.7 milliLiter(s) IntraMuscular once  ipratropium    for Nebulization 500 MICROGram(s) Nebulizer every 6 hours  lactobacillus acidophilus 1 Tablet(s) Oral daily  metoprolol tartrate 25 milliGRAM(s) Oral two times a day  mirtazapine 7.5 milliGRAM(s) Oral daily  nitrofurantoin monohydrate/macrocrystals (MACROBID) 100 milliGRAM(s) Oral two times a day  pantoprazole    Tablet 40 milliGRAM(s) Oral before breakfast  predniSONE   Tablet 20 milliGRAM(s) Oral daily  senna 1 Tablet(s) Oral daily  sodium bicarbonate 650 milliGRAM(s) Oral three times a day  sodium chloride 0.65% Nasal 2 Spray(s) Both Nostrils every 6 hours  thiamine Injectable 100 milliGRAM(s) IV Push three times a day  trimethoprim  160 mG/sulfamethoxazole 800 mG 1 Tablet(s) Oral daily      SOCIAL HISTORY:  Denies ETOh, Smoking,     FAMILY HISTORY:  FH: lung cancer (Sibling)    FH: CAD (coronary artery disease) (Sibling)        REVIEW OF SYSTEMS:  CONSTITUTIONAL:  weakness, no fevers or chills  EYES/ENT: No visual changes;  No vertigo or throat pain   NECK: No pain or stiffness  RESPIRATORY: No cough, wheezing, hemoptysis; No shortness of breath  CARDIOVASCULAR: No chest pain or palpitations.  GASTROINTESTINAL: loss stool, No abdominal or epigastric pain. No nausea, vomiting, or hematemesis;  No melena or hematochezia.  GENITOURINARY: No dysuria, frequency, foamy urine, urinary urgency, incontinence or hematuria  NEUROLOGICAL: No numbness or weakness  SKIN: No itching, burning, rashes, or lesions   VASCULAR: No bilateral lower extremity edema.   All other review of systems is negative unless indicated above.    VITALS:  T(F): 98.4 (23 @ 12:45), Max: 98.6 (23 @ 05:11)  HR: 76 (23 @ 12:45)  BP: 111/69 (23 @ 12:45)  RR: 18 (23 @ 12:45)  SpO2: 95% (23 @ 12:45)  Wt(kg): --     @ 07:01  -   @ 07:00  --------------------------------------------------------  IN: 250 mL / OUT: 800 mL / NET: -550 mL          PHYSICAL EXAM:  Constitutional: NAD  HEENT: anicteric sclera, oropharynx clear, MMM  Neck: No JVD  Respiratory: CTAB, no wheezes, rales or rhonchi  Cardiovascular: S1, S2, RRR  Gastrointestinal: BS+, soft, NT/ND  Extremities: No cyanosis or clubbing. No peripheral edema  Neurological: A/O x 3, no focal deficits  Psychiatric: Normal mood, normal affect  : No CVA tenderness. No lopez.   Skin: No rashes      LABS:      127<L>  |  95<L>  |  22  ----------------------------<  401<H>  4.5   |  18<L>  |  0.60    Ca    8.4      2023 12:38  Phos  2.8         TPro  6.1  /  Alb  3.4  /  TBili  0.7  /  DBili  0.2  /  AST  24  /  ALT  8<L>  /  AlkPhos  69      Creatinine Trend: 0.60 <--, 0.59 <--, 0.68 <--, 0.86 <--, 0.87 <--, 1.02 <--, 1.05 <--, 1.43 <--, 1.20 <--                        10.4   28.03 )-----------( 13       ( 2023 06:58 )             31.2     Urine Studies:  Urinalysis Basic - ( 2023 09:57 )    Color: Yellow / Appearance: Slightly Turbid / S.033 / pH:   Gluc:  / Ketone: Trace  / Bili: Negative / Urobili: Negative   Blood:  / Protein: 300 mg/dL / Nitrite: Negative   Leuk Esterase: Negative / RBC: 4 /hpf /  /HPF   Sq Epi:  / Non Sq Epi: 10 /hpf / Bacteria: Moderate          RADIOLOGY & ADDITIONAL STUDIES:

## 2023-02-06 NOTE — PROGRESS NOTE ADULT - SUBJECTIVE AND OBJECTIVE BOX
Hematology Oncology Follow-up    INTERVAL HPI/OVERNIGHT EVENTS:  No o/n events, patient resting comfortably. No complaints at this time. More energetic today then last week.  Patient specifically denies fever, chills, dizziness, weakness, CP, palpitations, SOB, cough, N/V/D/C, dysuria, changes in bowel movements, LE edema.        VITAL SIGNS:  T(F): 98.6 (02-06-23 @ 05:11)  HR: 81 (02-06-23 @ 05:11)  BP: 137/78 (02-06-23 @ 05:11)  RR: 18 (02-06-23 @ 05:11)  SpO2: 96% (02-06-23 @ 05:11)  Wt(kg): --    02-05-23 @ 07:01  -  02-06-23 @ 07:00  --------------------------------------------------------  IN: 250 mL / OUT: 800 mL / NET: -550 mL        PHYSICAL EXAM:    Constitutional: AAOx3, NAD  Eyes: PERRL, EOMI, sclera non-icteric  Neck: supple, no masses, no JVD, no lymphadenopathy  Respiratory: CTA b/l, no wheezing, rhonchi, rales, with normal respiratory effort  Cardiovascular: RRR, normal S1S2, no M/R/G  Gastrointestinal: soft, NTND, no masses palpable, BS normal in all four quadrants, no HSM  Extremities:  no edema  MSK: no obvious abnormalities, normal ROM, no lymphadenopathy  Neurological: Grossly intact  Skin: Normal temperature, no rash, no echymoses, no petichiae  Psych: normal affect    MEDICATIONS  (STANDING):  budesonide 160 MICROgram(s)/formoterol 4.5 MICROgram(s) Inhaler 2 Puff(s) Inhalation two times a day  chlorhexidine 2% Cloths 1 Application(s) Topical daily  cholecalciferol 2000 Unit(s) Oral daily  citalopram 10 milliGRAM(s) Oral daily  fluticasone propionate 50 MICROgram(s)/spray Nasal Spray 1 Spray(s) Both Nostrils two times a day  folic acid 1 milliGRAM(s) Oral daily  guaiFENesin  milliGRAM(s) Oral every 12 hours  influenza  Vaccine (HIGH DOSE) 0.7 milliLiter(s) IntraMuscular once  ipratropium    for Nebulization 500 MICROGram(s) Nebulizer every 6 hours  lactobacillus acidophilus 1 Tablet(s) Oral daily  metoprolol tartrate 25 milliGRAM(s) Oral two times a day  mirtazapine 7.5 milliGRAM(s) Oral daily  nitrofurantoin monohydrate/macrocrystals (MACROBID) 100 milliGRAM(s) Oral two times a day  pantoprazole    Tablet 40 milliGRAM(s) Oral before breakfast  predniSONE   Tablet 20 milliGRAM(s) Oral daily  senna 1 Tablet(s) Oral daily  sodium bicarbonate 650 milliGRAM(s) Oral three times a day  sodium chloride 0.65% Nasal 2 Spray(s) Both Nostrils every 6 hours  thiamine Injectable 100 milliGRAM(s) IV Push three times a day  trimethoprim  160 mG/sulfamethoxazole 800 mG 1 Tablet(s) Oral daily    MEDICATIONS  (PRN):  acetaminophen     Tablet .. 650 milliGRAM(s) Oral every 6 hours PRN Temp greater or equal to 38C (100.4F), Mild Pain (1 - 3)  aluminum hydroxide/magnesium hydroxide/simethicone Suspension 30 milliLiter(s) Oral every 4 hours PRN Dyspepsia  benzocaine/menthol Lozenge 1 Lozenge Oral every 8 hours PRN Sore Throat  melatonin 3 milliGRAM(s) Oral at bedtime PRN Insomnia  ondansetron Injectable 4 milliGRAM(s) IV Push every 8 hours PRN Nausea and/or Vomiting  polyethylene glycol 3350 17 Gram(s) Oral daily PRN Constipation      codeine (Nausea)  doxycycline (Nausea)  penicillin (Hives)      LABS:                        10.4   28.03 )-----------( 13       ( 06 Feb 2023 06:58 )             31.2     02-06    139  |  104  |  20  ----------------------------<  74  3.7   |  22  |  0.59    Ca    9.0      06 Feb 2023 06:55  Phos  2.8     02-06                       RADIOLOGY & ADDITIONAL TESTS:  Studies reviewed.

## 2023-02-06 NOTE — PROGRESS NOTE ADULT - ASSESSMENT
82 yr old female with a PMHx of diffuse large B cell lymphoma in remission (pt has a chemoport), depression, HLD, GERD, PE/DVT (4/2021 no longer on Eliquis), ANCA-vasculitis (20 y/a, no meds), s/p LVATS, LUIS wedge resection (2021), recent direct admit for RVATS, RUL Nodule Biopsy w/ IR marking in [facility] sent in from [facility] rehab for 2-3 days of lethargy and weakness with productive cough, found to have anemia, thrombocytopenia and leukocytosis. Hematology consulted for further work up.          # New diagnosis MDS with EB2    - CBC at admission with anemia of 5.9; platelet count of 68, wbc of 45.18. Review of records, patient with anemia since at least october 2022, however thrombocytopenia and leukocytosis is new.    - Received pRBC transfusion and responded appropriately, platelets downtrending ~30k   - Iron panel consistent AOCD; Ferritin elevated at 5768; B12 elevated, folate>20    - CT C/A/P with contrast without signs of LAD however, Multiple ill-defined opacities are noted within both lungs, more so in the right lower lobe. Exact etiology is unclear. B/L pleural effusions R>L   - RVP/covid negative,    - peripheral smear reviewed by hematology team during the initial consultation after this admission: no blasts, but immature WBCs noted, no schistocytes, thrombocytopenia noted    - Bone marrow biopsy 1/23/2023; Pathology consistent with MDS- EB 10-15% blasts .   - Hematology team discussed with her hematologist Dr. Madrigal at [facility]: plan is for single agent HMA for now; once follows up post rehab, can consider add on Dave. Rheumatology still considering rituximab; Plan will be to give Rituximab then HMA (azacytidine vs dacogen) once ID clears.   - bacteremia found on 1/31 blood culx (Staph epidermitis with methicillin resistance)  s/p meropenem and Vanco; now on nitrofurantion until 2/9   -left side Epistaxis found and ENT f/u, nostril pack removed 2/4 per note ;   transfusions for plts <10 if afebrile, <15 if febrile, 50 if bleeding, hbg <7    - Please check daily CBC with DIFF and CMP      # Hx of DLBCL EBV+   - Follows with Dr Madrigal, s/p R-mini-chop in 2021; Recent bone marrow biopsy for new anemia in Nov /2022 did not show any disease recurrence.    - repeat imaging this admission without signs of LAD     # ANCA vasculitis:    - Followed by rheum    - On chronic steroids - 20mg prednisone; on bactrim DS 1 tab daily for ppx

## 2023-02-06 NOTE — PROGRESS NOTE ADULT - SUBJECTIVE AND OBJECTIVE BOX
SUBJECTIVE/ OVERNIGHT EVENTS:  comfortable  awake alert  no cp, no sob, no n/v/d. no abdominal pain.  no headache, no dizziness.   plt 13. heme wanted 1 unit plts given superficial skin hematomas       --------------------------------------------------------------------------------------------  LABS:                        10.4   28.03 )-----------( 13       ( 06 Feb 2023 06:58 )             31.2     02-06    127<L>  |  95<L>  |  22  ----------------------------<  401<H>  4.5   |  18<L>  |  0.60    Ca    8.4      06 Feb 2023 12:38  Phos  2.8     02-06    TPro  6.1  /  Alb  3.4  /  TBili  0.7  /  DBili  0.2  /  AST  24  /  ALT  8<L>  /  AlkPhos  69  02-06      CAPILLARY BLOOD GLUCOSE                RADIOLOGY & ADDITIONAL TESTS:    Imaging Personally Reviewed:  [x] YES  [ ] NO    Consultant(s) Notes Reviewed:  [x] YES  [ ] NO    MEDICATIONS  (STANDING):  budesonide 160 MICROgram(s)/formoterol 4.5 MICROgram(s) Inhaler 2 Puff(s) Inhalation two times a day  chlorhexidine 2% Cloths 1 Application(s) Topical daily  cholecalciferol 2000 Unit(s) Oral daily  citalopram 10 milliGRAM(s) Oral daily  fluticasone propionate 50 MICROgram(s)/spray Nasal Spray 1 Spray(s) Both Nostrils two times a day  folic acid 1 milliGRAM(s) Oral daily  guaiFENesin  milliGRAM(s) Oral every 12 hours  influenza  Vaccine (HIGH DOSE) 0.7 milliLiter(s) IntraMuscular once  ipratropium    for Nebulization 500 MICROGram(s) Nebulizer every 6 hours  lactobacillus acidophilus 1 Tablet(s) Oral daily  metoprolol tartrate 25 milliGRAM(s) Oral two times a day  mirtazapine 7.5 milliGRAM(s) Oral daily  nitrofurantoin monohydrate/macrocrystals (MACROBID) 100 milliGRAM(s) Oral two times a day  pantoprazole    Tablet 40 milliGRAM(s) Oral before breakfast  predniSONE   Tablet 20 milliGRAM(s) Oral daily  senna 1 Tablet(s) Oral daily  sodium bicarbonate 650 milliGRAM(s) Oral three times a day  sodium bicarbonate  Infusion 0.108 mEq/kG/Hr (75 mL/Hr) IV Continuous <Continuous>  sodium chloride 0.65% Nasal 2 Spray(s) Both Nostrils every 6 hours  trimethoprim  160 mG/sulfamethoxazole 800 mG 1 Tablet(s) Oral daily    MEDICATIONS  (PRN):  acetaminophen     Tablet .. 650 milliGRAM(s) Oral every 6 hours PRN Temp greater or equal to 38C (100.4F), Mild Pain (1 - 3)  aluminum hydroxide/magnesium hydroxide/simethicone Suspension 30 milliLiter(s) Oral every 4 hours PRN Dyspepsia  benzocaine/menthol Lozenge 1 Lozenge Oral every 8 hours PRN Sore Throat  melatonin 3 milliGRAM(s) Oral at bedtime PRN Insomnia  ondansetron Injectable 4 milliGRAM(s) IV Push every 8 hours PRN Nausea and/or Vomiting  polyethylene glycol 3350 17 Gram(s) Oral daily PRN Constipation      Care Discussed with Consultants/Other Providers [x] YES  [ ] NO    Vital Signs Last 24 Hrs  T(C): 36.9 (06 Feb 2023 12:45), Max: 37 (06 Feb 2023 05:11)  T(F): 98.4 (06 Feb 2023 12:45), Max: 98.6 (06 Feb 2023 05:11)  HR: 76 (06 Feb 2023 12:45) (73 - 81)  BP: 111/69 (06 Feb 2023 12:45) (111/69 - 154/81)  BP(mean): --  RR: 18 (06 Feb 2023 12:45) (18 - 19)  SpO2: 95% (06 Feb 2023 12:45) (94% - 96%)    Parameters below as of 06 Feb 2023 12:45  Patient On (Oxygen Delivery Method): room air      I&O's Summary    05 Feb 2023 07:01  -  06 Feb 2023 07:00  --------------------------------------------------------  IN: 250 mL / OUT: 800 mL / NET: -550 mL        PHYSICAL EXAM:  GENERAL: NAD, well-developed, comfortable on room air  HEAD:  Atraumatic, Normocephalic  NOSE: left nostril nasal packing now removed.   EYES: EOMI, PERRLA, conjunctiva and sclera clear  NECK: Supple, No JVD  CHEST/LUNG: mild decrease breath sounds bilaterally; No wheeze   HEART: Regular rate and rhythm; No murmurs, rubs, or gallops  ABDOMEN: Soft, Nontender, Nondistended; Bowel sounds present  Neuro: awake alert, back to baseline mental status. no focal weakness  EXTREMITIES:  2+ Peripheral Pulses, No clubbing, cyanosis, trace b/l edema

## 2023-02-06 NOTE — PROGRESS NOTE ADULT - ASSESSMENT
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 She presented here from [facility] for elevated WBC and low hemoglobin, severe weakness. Pt states for the past 2-3 days has been having increased weakness and lethargy, decreased appetite. Reports chronic cough, currently nonproductive - RVP/COVID negative. Has multiple episodes of loose stools x weeks, reported recently trying marijuana for appetite and noted worsening. She was being treated empirically for c.diff with oral vancomycin but then became constipated, s/p bowel regimen and having normal bowel movements. Patient initially being monitored off antibiotics given she was afebrile, WBC was stable and no infectious source was found. She had a fever 100.9F on 1/30 overnight with worsening leukocytosis and then 101.4F overnight 1/31 and noted with confusion.     Sepsis due to gram positive bacteremia    E. faecalis bacteremia - unclear source, Ucx grew E. faecium-VRE   Staph epi bacteremia ?skin vs port source vs contamination  AMS - neurotoxicity d/t cefepime vs infectious etiology    --d/c cefepime 1/31, mental status improved   H/o PCN allergy with hives  - sepsis resolved - afebrile, WBC downtrending/stable  - L chest port without sign of infection  - repeat CXR with bibasilar hazy opacification - may be atelectasis or pneumonia   - 1/30 Bcx (1 set sent) -- both bottles grew Staph epi - MRSE -- sensitivities noted   - 1/31 Bcx (1 set sent) - aerobic bottle grew E. 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  H/o DLBCL, EBV+   - Pulmonary, Rheumatology and Heme/Onc following   - noted patient had similar presentation with bicytopenia and leukocytosis in the past, may need tx with rituximab   - s/p BMBx 1/23 - found with myelodysplastic syndrome    - quantiferon indeterminate - pt on steroids which can cause indeterminate results    - CT - s/p wedge resections in b/l upper lobes, 2 cm nodular opacity a/w staple line in RUL; multiple ill-defined b/l opacities more in RLL -unclear etiology, b/l effusions R>L   - continue on Bactrim DS 1 tablet daily for PCP ppx while on prednisone    L epistaxis   - ENT following, packing removed      Yevgeniy Malave M.D.  [facility], Division of Infectious Diseases  [phone]  After 5pm on weekdays and all day on weekends - please call [phone] Patient is a 82 year old female with a PMH of diffuse large B cell lymphoma in remission, non-hodgkin's lymphoma (chemoport), depression, HLD, GERD, PE/DVT (4/2021 no longer on Eliquis), ANCA-vasculitis (20 y/a, no meds), s/p LVATS, LUIS wedge resection (2021), recent direct admit for RVATS, RUL Nodule Biopsy w/ IR marking in [facility], path favoring vasculitis, treated with Decadron, then switched to PO prednisone, went to [facility]. She presented here from [facility] for elevated WBC and low hemoglobin, severe weakness. Pt states for the past 2-3 days has been having increased weakness and lethargy, decreased appetite. Reports chronic cough, currently nonproductive - RVP/COVID negative. Has multiple episodes of loose stools x weeks, reported recently trying marijuana for appetite and noted worsening. She was being treated empirically for c.diff with oral vancomycin but then became constipated, s/p bowel regimen and having normal bowel movements. Patient initially being monitored off antibiotics given she was afebrile, WBC was stable and no infectious source was found. She had a fever 100.9F on 1/30 overnight with worsening leukocytosis and then 101.4F overnight 1/31 and noted with confusion.     Sepsis due to gram positive bacteremia    E. faecalis bacteremia - unclear source, Ucx grew E. faecium-VRE   Staph epi bacteremia ?skin vs port source vs contamination  AMS - neurotoxicity d/t cefepime vs infectious etiology    --d/c cefepime 1/31, mental status improved   H/o PCN allergy with hives  - sepsis resolved - afebrile, WBC downtrending/stable  - L chest port without sign of infection  - repeat CXR with bibasilar hazy opacification - may be atelectasis or pneumonia   - 1/30 Bcx (1 set sent) -- both bottles grew Staph epi - MRSE -- sensitivities noted   - 1/31 Bcx (1 set sent) - aerobic bottle grew E. 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  H/o DLBCL, EBV+   - Pulmonary, Rheumatology and Heme/Onc following   - noted patient had similar presentation with bicytopenia and leukocytosis in the past, may need tx with rituximab   - s/p BMBx 1/23 - found with myelodysplastic syndrome    - quantiferon indeterminate - pt on steroids which can cause indeterminate results    - CT - s/p wedge resections in b/l upper lobes, 2 cm nodular opacity a/w staple line in RUL; multiple ill-defined b/l opacities more in RLL -unclear etiology, b/l effusions R>L   - continue on Bactrim DS 1 tablet daily for PCP ppx while on prednisone    L epistaxis   - ENT following, packing removed      Yevgeniy Malave M.D.  [facility], Division of Infectious Diseases  [phone]  After 5pm on weekdays and all day on weekends - please call [phone]

## 2023-02-06 NOTE — PROGRESS NOTE ADULT - ATTENDING COMMENTS
She is more coherent is speech today and less fever as infection appears to be controlled by antibiotic including oral nitrofurantoin. Please give one unit of platelets as she has worsening hematoma. The management of the MDS/acute myeloid leukemia may be decitabine based treatment when she is stable

## 2023-02-06 NOTE — PROGRESS NOTE ADULT - ASSESSMENT
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 She presented here from [facility] for elevated WBC and low hemoglobin, severe weakness. Pt states for the past 2-3 days has been having increased weakness and lethargy, decreased appetite. +sputum productive cough. + cui, no sob, no difficulty breathing. No abdominal pain, nausea, vomiting, no bloody stools. Has endorsed multiple episodes of loose stools x weeks, currently being treated for c.diff with oral vancomycin.     Fatigue/dyspnea: due to severe anemia  - 2' MDS  - transfuse to keep Hgb above 7.0  - no melena, no hematochezia. no BRBPR. occult stool neg.   - she tends to require IV Lasix intermittently post transfusion.  - doubt GI bleed   - Physical therapy. Out of bed to chair with assistance.     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  - s/p Vanco PO a few days (started in rehab)  - diarrhea completely resolved.   - monitor off PO vanco per ID  - now constipated. PRN bowel regimen started. +BM    Fever, not neutropenic, starting 1/30/23 @ 5:05 AM  - RVP 1/30/23 (-)  - monitor blood cxs 1/30/23  - started empirically on cefepime 1/30/23, switched to merrem 1/31/23  - enterrocus bacteremia, abx per ID.   - Macrobid x 5 days couse added for VRE in urine  - blood cultures prelimp neg.     AMS  - unclear etiology, likely metabolic encephalopathy from ?Cefepime? received 3 doses, last dose 1/31/23  - CT head neg. sugars stable  - vanco IV added per ID.   - close monitoring   - monitor glucose (glucose 65 on BMP, but 95 on fingersticks)  - encourage PO intake   - mental status improving.     Pulmonary vasculitis   - Recent TTE on 11/3/22 reviewed: normal LV. outpt card Dr. Ady Cooper  - outpt pulm Mack Tucker   - s/p thoracoscopic biopsy of mediastinal lymph node and Lung wedge resection 11/10/22  - recent pleural effusion s/p 650 cc U/S-guided rt thoracentesis 11/17/22  - exudative but no neutrophilic predominance and initial Gram stain w/o organisms  - cultures neg.   - path results favoring vasculitis: no evidence for lymphoma. Cytology also came back negative.   - comfortable on nasal canula, better post diuretic last admission.   - rheum and heme-onc following previously, will reconsult.   - last admission, she was not started on immunosuppressants such as cellcept given recent treatment for COVID19 at that time  - c/w prednisone 20 mg qdaily.   - RTX under consideration --> acute hepatitis panel (-) and quantiferon indeterminate  - ID started PCP ppx with Bactrim.     hx of Tachycardia    - cont low-dose metoprolol, 50 mg to 25 mg dose reduced in rehab.   - card consult (Dr. Hooker) in the past, if needed    Anxiety and depression  - stable, continue citalopram and Remeron.  - Pt denied SI/HI ideations, denied visual and auditory hallucinations.     GERD (gastroesophageal reflux disease)  - continue PPI    Hyperlipidemia.   - continue home ezetimibe. okay to hold if nonformulary     DVT ppx   - holding AC in the setting of anemia, pancytopenia.   - venodyne boots LEs

## 2023-02-06 NOTE — PROGRESS NOTE ADULT - NSPROGADDITIONALINFOA_GEN_ALL_CORE
d/w pt and NP.   d/w ID.   supplement electrolytes on IV Lasix.   respiratory status improving (received IV Lasix x 3 days, will temporarily hold and redose PRN).    hyponatremia: renal consulted. bicarb drip started.   plt 13. heme wanted 1 unit plts given superficial skin hematomas.    - Dr. HERNANDEZ Htet ([facility])  - [phone]

## 2023-02-06 NOTE — PROGRESS NOTE ADULT - ASSESSMENT
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 She presented here from [facility] for elevated WBC and low hemoglobin, severe weakness. Pt states for the past 2-3 days has been having increased weakness and lethargy, decreased appetite. +sputum productive cough. + cui, no sob, no difficulty breathing. No abdominal pain, nausea, vomiting, no bloody stools. Has endorsed multiple episodes of loose stools x weeks, currently being treated for c.diff with oral vancomycin.       Fatigue/dyspnea: likely due to severe anemia :  pt s/p 1 unit PRB  Diarrhea  Pulmonary vasculitis  :  hx of Tachycardia :  Recent TTE on 11/3/22 reviewed: normal LV. outpt card Dr. Ady Cooper  Anxiety and depression  GERD (gastroesophageal reflux disease)  Hyperlipidemia.   DVT ppx     1/20:  Fatigue/dyspnea: likely due to severe anemia :  pt s/p 1 unit PRBC: hb now  > 10  : she is on 2 L of oxygen : no wheezing: no overt signs of bleeding : ct chest is abnormal report noted:  has jean-claude ill defined opacities of unclear etiology  : venous ph is mild acidotic:  no need for Bipap at this time : monitor and trend hb  Diarrhea : symptomatic rx:  workup per primary team  Pulmonary vasculitis  : per rheumatology  : had recent vats surgery : LN biopsy noted:   hx of Tachycardia :  Recent TTE on 11/3/22 reviewed: normal LV. outpt card Dr. Ady Cooper  Anxiety and depression  GERD (gastroesophageal reflux disease) : ppi   Hyperlipidemia.   recent covid  : o n last admission she was treated for covid infection:   DVT ppx   d wteam    1/22:    Fatigue/dyspnea: likely due to severe anemia :  pt s/p 1 unit PRBC: hb now  > 10  : she is on 2 L of oxygen : no wheezing: no overt signs of bleeding : ct chest is abnormal report noted:  has jean-claude ill defined opacities of unclear etiology  : venous ph is mild acidotic:  no need for Bipap at this time : monitor and trend hb: she remained stable o gracie last 1 days:  she is not on any antibtiocs at this time: RVP  and blood cultures are negative: she had ebus biopsy also before: reviewed: ID following  Diarrhea : symptomatic rx:  workup per primary team : seems to have resolved  Pulmonary vasculitis  : per rheumatology  : had recent vats surgery : LN biopsy noted:   Bicytopneia and leucocytosis: ? etiology  : for possible bone marrow biopsy on Monday    hx of Tachycardia :  Recent TTE on 11/3/22 reviewed: normal LV. outpt card Dr. Ady Cooper  Anxiety and depression  GERD (gastroesophageal reflux disease) : ppi   Hyperlipidemia.   recent covid  : on last admission she was treated for covid infection:   DVT ppx   dw team    1/23:    Fatigue/dyspnea: likely due to severe anemia : feels weak  but no bleeding noted:  on 2 L of oxygen : she needs PT OT   Diarrhea :resolved:   Pulmonary vasculitis  : per rheumatology  : had recent vats surgery : LN biopsy noted: : she never received teat ment for vasculitis except low dose steroids:  she is awaiting bone marrow biopsy prior to induction therapy with rituximab: The ct chest done on this admission does not show pneumothorax and has jean-claude effusions:  There are some new opacites in rigth lower lobe which were not therre:  clinically she does not seem to have pneumonia: ID following: could it be a prt of vasculitis  Bicytopneia and leucocytosis: ? etiology  : for possible bone marrow biopsy today  hx of Tachycardia :  Recent TTE on 11/3/22 reviewed: normal LV. outpt card Dr. Ady Cooper  Anxiety and depression  GERD (gastroesophageal reflux disease) : ppi   Hyperlipidemia.   recent covid  : on last admission she was treated for covid infection:   DVT ppx   dw team    1/24:    Fatigue/dyspnea: likely due to severe anemia : feels weak  but no bleeding noted:  on 2 L of oxygen : she needs PT OT   Diarrhea :resolved:   Pulmonary vasculitis  : per rheumatology  : had recent vats surgery : LN biopsy noted: : she never received teat ment for vasculitis except low dose steroids:  she is awaiting bone marrow biopsy prior to induction therapy with rituximab: The ct chest done on this admission does not show pneumothorax and has jean-claude effusions:  There are some new opacites in rigth lower lobe which were not therre:  clinically she does not seem to have pneumonia: ID following: could it be a prt of vasculitis  Bicytopneia and leucocytosis: BM biopsy done: await results for eventual immunosuppressives therapy:   hx of Tachycardia :  Recent TTE on 11/3/22 reviewed: normal LV. outpt card Dr. Ady Cooper  Anxiety and depression  GERD (gastroesophageal reflux disease) : ppi   Hyperlipidemia.   recent covid  : on last admission she was treated for covid infection:   DVT ppx   dw team    1/25:    Fatigue/dyspnea: likely due to severe anemia : feels weak  but no bleeding noted:  on 2 L of oxygen : she needs PT OT   Diarrhea :resolved:   Pulmonary vasculitis  : per rheumatology  : had recent vats surgery : LN biopsy noted: : she never received teat ment for vasculitis except low dose steroids:  she is awaiting bone marrow biopsy prior to induction therapy with rituximab: The ct chest done on this admission does not show pneumothorax and has jean-claude effusions:  There are some new opacites in rigth lower lobe which were not there:  clinically she does not seem to have pneumonia: ID following: could it be a part of vasculitis : her resp status has not changed   Bicytopneia and leucocytosis: BM biopsy done: await results for still pending   hx of Tachycardia :  Recent TTE on 11/3/22 reviewed: normal LV. outpt card Dr. Ady Cooper  Anxiety and depression  GERD (gastroesophageal reflux disease) : ppi   Hyperlipidemia.   recent covid  : on last admission she was treated for covid infection:   DVT ppx   dw team: awaiting decision on immunosuppression     1/26;      Fatigue/dyspnea: likely due to severe anemia : feels weak  but no bleeding noted:  on 2 L of oxygen : she needs PT OT : got it yesterday    Diarrhea :resolved:   Pulmonary vasculitis  : per rheumatology  : had recent vats surgery : LN biopsy noted: : she never received teat ment for vasculitis except low dose steroids:  she is awaiting bone marrow biopsy prior to induction therapy with rituximab: The ct chest done on this admission does not show pneumothorax and has jean-claude effusions:  There are some new opacities in rigth lower lobe which were not there:  clinically she does not seem to have pneumonia: ID following: could it be a part of vasculitis : her resp status has not changed  : being observed off antibiotics   Bicytopneia and leucocytosis: BM biopsy done: await results for still pending   hx of Tachycardia :  Recent TTE on 11/3/22 reviewed: normal LV. outpt card Dr. Ady Cooper  Anxiety and depression  GERD (gastroesophageal reflux disease) : ppi   Hyperlipidemia.   recent covid  : on last admission she was treated for covid infection:   DVT ppx   dw team: awaiting decision on immunosuppression     1/27:    Fatigue/dyspnea: likely due to severe anemia : feels weak  but no bleeding noted:  on 2 L of oxygen : cont  PT OT :   Diarrhea :resolved:   Pulmonary vasculitis  : per rheumatology  : had recent vats surgery : LN biopsy noted: : she never received teat ment for vasculitis except low dose steroids:  she is awaiting bone marrow biopsy prior to induction therapy with rituximab: The ct chest done on this admission does not show pneumothorax and has jean-claude effusions:  There are some new opacities in rigth lower lobe which were not there:  clinically she does not seem to have pneumonia: ID following: could it be a part of vasculitis : her resp status has not changed  : being observed off antibiotics :  on bactrim prophylaxis as she is on chr steroids   Bicytopneia and leucocytosis: BM biopsy done: await results for still pending   hx of Tachycardia :  Recent TTE on 11/3/22 reviewed: normal LV. outpt card Dr. Ady Cooper  Anxiety and depression  GERD (gastroesophageal reflux disease) : ppi   Hyperlipidemia.   recent covid  : on last admission she was treated for covid infection:   DVT ppx   dw team: awaiting decision on immunosuppression     1/29:    Fatigue/dyspnea: likely due to severe anemia : feels weak  but no bleeding noted:  on 2 L of oxygen : cont  PT OT :   Diarrhea :resolved:   Pulmonary vasculitis  : per rheumatology  : had recent vats surgery : LN biopsy noted: : she never received treat ment for vasculitis except low dose steroids:  she is awaiting bone marrow biopsy prior to induction therapy with rituximab: The ct chest done on this admission does not show pneumothorax and has jean-claude effusions:  There are some new opacities in rigth lower lobe which were not there:  clinically she does not seem to have pneumonia: ID following: could it be a part of vasculitis : her resp status has not changed  : being observed off antibiotics :  on bactrim prophylaxis as she is on chr steroids   Bicytopneia and leucocytosis: BM biopsy: results reviewed defer to [facility]  hx of Tachycardia :  Recent TTE on 11/3/22 reviewed: normal LV.   Anxiety and depression  GERD (gastroesophageal reflux disease) : ppi   Hyperlipidemia.   recent covid  : on last admission she was treated for covid infection:   DVT ppx   dw team: awaiting decision on immunosuppression     1/30:    Fatigue/dyspnea: likely due to severe anemia : feels weak  but no bleeding noted:  on 2 L of oxygen : cont  PT OT : sitting in chair today   Diarrhea :resolved:   Pulmonary vasculitis  : per rheumatology  : had recent vats surgery : LN biopsy noted: : she never received treat ment for vasculitis except low dose steroids:  she is awaiting bone marrow biopsy prior to induction therapy with rituximab: The ct chest done on this admission does not show pneumothorax and has jean-claude effusions:  There are some new opacities in rigth lower lobe which were not there:  clinically she does not seem to have pneumonia: ID following: could it be a part of vasculitis : her resp status has not changed  : being observed off antibiotics :  on bactrim prophylaxis as she is on chr steroids  /l however she had low grade tempt today : rvp is negative : cultures sent: ID follow up   Bicytopneia and leucocytosis: BM biopsy: results reviewed defer to [facility]  hx of Tachycardia :  Recent TTE on 11/3/22 reviewed: normal LV.   Anxiety and depression  GERD (gastroesophageal reflux disease) : ppi   Hyperlipidemia.   recent covid  : on last admission she was treated for covid infection:   DVT ppx   dw team: awaiting decision on immunosuppression     1/31:    Fatigue/dyspnea: likely due to severe anemia : feels weak  but no bleeding noted:  on 2 L of oxygen : cont  PT OT : lying in bed:   Diarrhea :resolved:   Pulmonary vasculitis  : per rheumatology  : had recent vats surgery : LN biopsy noted: : she never received treat ment for vasculitis except low dose steroids:  she is awaiting bone marrow biopsy prior to induction therapy with rituximab: The ct chest done on this admission does not show pneumothorax and has jean-claude effusions:  There are some new opacities in rigth lower lobe which were not there:  clinically she does not seem to have pneumonia: ID following: could it be a part of vasculitis : her resp status has not changed  : being observed off antibiotics :  on bactrim prophylaxis as she is on chr steroids : she seems OK:  no sob:     Bicytopneia and leucocytosis: BM biopsy: results reviewed defer to [facility] ; for eventual chemo   hx of Tachycardia :  Recent TTE on 11/3/22 reviewed: normal LV.   Anxiety and depression  GERD (gastroesophageal reflux disease) : ppi   Hyperlipidemia.   recent covid  : on last admission she was treated for covid infection:   DVT ppx   dw team: awaiting decision on immunosuppression     2/1:    Fatigue/dyspnea: likely due to severe anemia : feels weak  but no bleeding noted:  on 2 L of oxygen : cont  PT OT : lying in bed:  her blood cultures are contaminant:   Diarrhea :resolved:   Pulmonary vasculitis  : per rheumatology  : had recent vats surgery : LN biopsy noted: : she never received treat ment for vasculitis except low dose steroids:  she is awaiting bone marrow biopsy prior to induction therapy with rituximab: The ct chest done on this admission does not show pneumothorax and has jean-claude effusions:  There are some new opacities in rigth lower lobe which were not there:  clinically she does not seem to have pneumonia: ID following: could it be a part of vasculitis : her resp status has not changed  : being observed off antibiotics :  on bactrim prophylaxis as she is on chr steroids : she seems OK:  no sob:     Bicytopneia and leucocytosis: BM biopsy: results reviewed defer to [facility] ; for eventual chemo   hx of Tachycardia :  Recent TTE on 11/3/22 reviewed: normal LV.   Anxiety and depression  GERD (gastroesophageal reflux disease) : ppi   Hyperlipidemia.   recent covid  : on last admission she was treated for covid infection:   DVT ppx   dw team: awaiting decision on immunosuppression: overall her general condition seems very poor and very weak:     2/2:    Fatigue/dyspnea: likely due to severe anemia : feels weak  but no bleeding noted:  on 2 L of oxygen : cont  PT OT : lying in bed:  her blood cultures are positive E FAECALIS :  ON MEROPENEM  Diarrhea :resolved:   Pulmonary vasculitis  : per rheumatology  : had recent vats surgery : LN biopsy noted: : she never received treat ment for vasculitis except low dose steroids:  she is awaiting bone marrow biopsy prior to induction therapy with rituximab: The ct chest done on this admission does not show pneumothorax and has jean-claude effusions:  There are some new opacities in rigth lower lobe which were not there:  clinically she does not seem to have pneumonia: ID following: could it be a part of vasculitis : her resp status has not changed  : being observed off antibiotics :  on bactrim prophylaxis as she is on chr steroids : she seems OK:  no sob:     Bicytopneia and leucocytosis: BM biopsy: results reviewed defer to [facility] ; for eventual chemo   hx of Tachycardia :  Recent TTE on 11/3/22 reviewed: normal LV.   Anxiety and depression  GERD (gastroesophageal reflux disease) : ppi   Hyperlipidemia.   recent covid  : on last admission she was treated for covid infection:   DVT ppx   dw team: awaiting decision on immunosuppression: overall her general condition seems very poor and very weak: ON ANTIBIOTICS     2/3   Pulmonary Vasculitis  -Steroids per rheum  -Plan for eventual Rituxan  -Bactrim for PCP ppx  MDS  -Per heme/onc  Bacteremia  -E. Faecalis bacteremia  -ABX per ID  Epistaxis  -Per ENT  -Breathing comfortably on 40% humidified face tent, keep sats >90%    2/6:    2/3   Pulmonary Vasculitis  -Steroids per rheum  -Plan for eventual Rituxan /l she is on room air at this time  -Bactrim for PCP ppx  MDS  -Per heme/onc  Bacteremia  -E. Faecalis bacteremia  -ABX per ID  Epistaxis  -Per ENT  - she is on room air today  : cont to mantain o2 sao2 above 90% all the ti me:    demi acp

## 2023-02-06 NOTE — PROGRESS NOTE ADULT - SUBJECTIVE AND OBJECTIVE BOX
[facility]      Seen earlier today [Please note that date of entry above  is the actual  DATE OF SERVICE] No new neurological symptoms reported. Remains stable neurologically.   - Today, patient is without complaints.          *****MEDICATIONS: Current medication reviewed and documented.    MEDICATIONS  (STANDING):  budesonide 160 MICROgram(s)/formoterol 4.5 MICROgram(s) Inhaler 2 Puff(s) Inhalation two times a day  chlorhexidine 2% Cloths 1 Application(s) Topical daily  cholecalciferol 2000 Unit(s) Oral daily  citalopram 10 milliGRAM(s) Oral daily  fluticasone propionate 50 MICROgram(s)/spray Nasal Spray 1 Spray(s) Both Nostrils two times a day  folic acid 1 milliGRAM(s) Oral daily  guaiFENesin  milliGRAM(s) Oral every 12 hours  influenza  Vaccine (HIGH DOSE) 0.7 milliLiter(s) IntraMuscular once  ipratropium    for Nebulization 500 MICROGram(s) Nebulizer every 6 hours  lactobacillus acidophilus 1 Tablet(s) Oral daily  metoprolol tartrate 25 milliGRAM(s) Oral two times a day  mirtazapine 7.5 milliGRAM(s) Oral daily  nitrofurantoin monohydrate/macrocrystals (MACROBID) 100 milliGRAM(s) Oral two times a day  pantoprazole    Tablet 40 milliGRAM(s) Oral before breakfast  predniSONE   Tablet 20 milliGRAM(s) Oral daily  senna 1 Tablet(s) Oral daily  sodium bicarbonate 650 milliGRAM(s) Oral three times a day  sodium bicarbonate  Infusion 0.108 mEq/kG/Hr (75 mL/Hr) IV Continuous <Continuous>  sodium chloride 0.65% Nasal 2 Spray(s) Both Nostrils every 6 hours  thiamine Injectable 100 milliGRAM(s) IV Push three times a day  trimethoprim  160 mG/sulfamethoxazole 800 mG 1 Tablet(s) Oral daily    MEDICATIONS  (PRN):  acetaminophen     Tablet .. 650 milliGRAM(s) Oral every 6 hours PRN Temp greater or equal to 38C (100.4F), Mild Pain (1 - 3)  aluminum hydroxide/magnesium hydroxide/simethicone Suspension 30 milliLiter(s) Oral every 4 hours PRN Dyspepsia  benzocaine/menthol Lozenge 1 Lozenge Oral every 8 hours PRN Sore Throat  melatonin 3 milliGRAM(s) Oral at bedtime PRN Insomnia  ondansetron Injectable 4 milliGRAM(s) IV Push every 8 hours PRN Nausea and/or Vomiting  polyethylene glycol 3350 17 Gram(s) Oral daily PRN Constipation          ***** VITAL SIGNS:   Vital Signs Last 24 Hrs       T(F): 98.4 (02-06-23 @ 12:45), Max: 98.6 (02-06-23 @ 05:11)  HR: 76 (02-06-23 @ 12:45) (73 - 81)  BP: 111/69 (02-06-23 @ 12:45) (111/69 - 154/81)  RR: 18 (02-06-23 @ 12:45) (18 - 19)  SpO2: 95% (02-06-23 @ 12:45) (94% - 96%)  Wt(kg): --  I&O's Summary    05 Feb 2023 07:01  -  06 Feb 2023 07:00  --------------------------------------------------------  IN: 250 mL / OUT: 800 mL / NET: -550 mL             *****PHYSICAL EXAM:   alert oriented x 3 attention comprehension are fair.  Able to name, repeat.   EOmi fundi not visualized   no nystagmus VFF to confrontation  Tongue is midline  Palate elevates symmetrically   Moving all 4 ext spontaneously no drift appreciated    Gait not assessed.            *****LAB AND IMAGING:                        10.4   28.03 )-----------( 13       ( 06 Feb 2023 06:58 )             31.2               02-06    127<L>  |  95<L>  |  22  ----------------------------<  401<H>  4.5   |  18<L>  |  0.60    Ca    8.4      06 Feb 2023 12:38  Phos  2.8     02-06    TPro  6.1  /  Alb  3.4  /  TBili  0.7  /  DBili  0.2  /  AST  24  /  ALT  8<L>  /  AlkPhos  69  02-06                         [All pertinent recent Imaging/Reports reviewed]            *****A S S E S S M E N T   A N D   P L A N :  82 yr old female with a PMHx of diffuse large B cell lymphoma in remission, non-hodgkin's lymphoma (chemoport), depression, HLD, GERD, PE/DVT (4/2021 no longer on Eliquis), 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 She presented here from [facility] for elevated WBC and low hemoglobin, severe weakness. Pt states for the past 2-3 days has been having increased weakness and lethargy, decreased appetite. +sputum productive cough. + cui, no sob, no difficulty breathing. No abdominal pain, nausea, vomiting, no bloody stools. Has endorsed multiple episodes of loose stools x weeks, currently being treated for c.diff with oral vancomycin        Problem/Recommendations 1:  mutism   likely metabolic encephalopathy due to sepsis, abx related vs malnutrition worsening underlying cognitive impairment   gut microbiome depletion due to recurrent abx   consider lactobacillus   now improving spontaneously   thiamine iv  encourage po intake   avoid cefepime   sleep wake cycle regulation   2/6 mental status back to baseline           Problem/Recommendations 2:    leukocytosis   defer to id/onc follow up cultures   flow cytometry    r/o cdiff    low platelet   monitor closely    prognosis guarded          Thank you for allowing me to participate in the care of this patient. Will continue to follow patient periodically. Please do not hesitate to call me if you have any  questions or if there has been a change in patients neurological status     ________________  Alina Wasserman MD  [facility]  [phone]      33 minutes spent on total encounter; more than 50 % of the visit was  spent counseling about plan of care, compliance to diet/exercise and medication regimen and or  coordinating care by the attending physician.      It is advised that stroke patients follow up with JUSTIN Umanzor @ [phone] in 1- 2 weeks.   Others please follow up with Dr. Michael Nissenbaum [phone]

## 2023-02-06 NOTE — PROGRESS NOTE ADULT - SUBJECTIVE AND OBJECTIVE BOX
Date of Service: 02-06-23 @ 12:58    Patient is a 82y old  Female who presents with a chief complaint of weakness (06 Feb 2023 12:31)      Any change in ROS: on room air;     MEDICATIONS  (STANDING):  budesonide 160 MICROgram(s)/formoterol 4.5 MICROgram(s) Inhaler 2 Puff(s) Inhalation two times a day  chlorhexidine 2% Cloths 1 Application(s) Topical daily  cholecalciferol 2000 Unit(s) Oral daily  citalopram 10 milliGRAM(s) Oral daily  fluticasone propionate 50 MICROgram(s)/spray Nasal Spray 1 Spray(s) Both Nostrils two times a day  folic acid 1 milliGRAM(s) Oral daily  guaiFENesin  milliGRAM(s) Oral every 12 hours  influenza  Vaccine (HIGH DOSE) 0.7 milliLiter(s) IntraMuscular once  ipratropium    for Nebulization 500 MICROGram(s) Nebulizer every 6 hours  lactobacillus acidophilus 1 Tablet(s) Oral daily  metoprolol tartrate 25 milliGRAM(s) Oral two times a day  mirtazapine 7.5 milliGRAM(s) Oral daily  nitrofurantoin monohydrate/macrocrystals (MACROBID) 100 milliGRAM(s) Oral two times a day  pantoprazole    Tablet 40 milliGRAM(s) Oral before breakfast  predniSONE   Tablet 20 milliGRAM(s) Oral daily  senna 1 Tablet(s) Oral daily  sodium bicarbonate 650 milliGRAM(s) Oral three times a day  sodium chloride 0.65% Nasal 2 Spray(s) Both Nostrils every 6 hours  thiamine Injectable 100 milliGRAM(s) IV Push three times a day  trimethoprim  160 mG/sulfamethoxazole 800 mG 1 Tablet(s) Oral daily    MEDICATIONS  (PRN):  acetaminophen     Tablet .. 650 milliGRAM(s) Oral every 6 hours PRN Temp greater or equal to 38C (100.4F), Mild Pain (1 - 3)  aluminum hydroxide/magnesium hydroxide/simethicone Suspension 30 milliLiter(s) Oral every 4 hours PRN Dyspepsia  benzocaine/menthol Lozenge 1 Lozenge Oral every 8 hours PRN Sore Throat  melatonin 3 milliGRAM(s) Oral at bedtime PRN Insomnia  ondansetron Injectable 4 milliGRAM(s) IV Push every 8 hours PRN Nausea and/or Vomiting  polyethylene glycol 3350 17 Gram(s) Oral daily PRN Constipation    Vital Signs Last 24 Hrs  T(C): 36.9 (06 Feb 2023 12:45), Max: 37 (06 Feb 2023 05:11)  T(F): 98.4 (06 Feb 2023 12:45), Max: 98.6 (06 Feb 2023 05:11)  HR: 76 (06 Feb 2023 12:45) (68 - 81)  BP: 111/69 (06 Feb 2023 12:45) (111/69 - 154/81)  BP(mean): --  RR: 18 (06 Feb 2023 12:45) (18 - 19)  SpO2: 95% (06 Feb 2023 12:45) (94% - 96%)    Parameters below as of 06 Feb 2023 12:45  Patient On (Oxygen Delivery Method): room air        I&O's Summary    05 Feb 2023 07:01  -  06 Feb 2023 07:00  --------------------------------------------------------  IN: 250 mL / OUT: 800 mL / NET: -550 mL          Physical Exam:   GENERAL: NAD, well-groomed, well-developed  HEENT: RANJIT/   Atraumatic, Normocephalic  ENMT: No tonsillar erythema, exudates, or enlargement; Moist mucous membranes, Good dentition, No lesions  NECK: Supple, No JVD, Normal thyroid  CHEST/LUNG: Clear to auscultaion  CVS: Regular rate and rhythm; No murmurs, rubs, or gallops  GI: : Soft, Nontender, Nondistended; Bowel sounds present  NERVOUS SYSTEM:  Alert & Oriented X3  EXTREMITIES:  2+ Peripheral Pulses, No clubbing, cyanosis, or edema  LYMPH: No lymphadenopathy noted  SKIN: No rashes or lesions  ENDOCRINOLOGY: No Thyromegaly  PSYCH: Appropriate    Labs:  21                            10.4   28.03 )-----------( 13       ( 06 Feb 2023 06:58 )             31.2                         10.1   28.78 )-----------( 26       ( 05 Feb 2023 07:33 )             29.6                         11.1   33.58 )-----------( 8        ( 04 Feb 2023 07:03 )             32.2                         12.0   49.00 )-----------( 26       ( 02 Feb 2023 16:52 )             34.1     02-06    139  |  104  |  20  ----------------------------<  74  3.7   |  22  |  0.59  02-05    141  |  103  |  32<H>  ----------------------------<  72  3.1<L>   |  26  |  0.68  02-04    134<L>  |  99  |  36<H>  ----------------------------<  64<L>  3.6   |  19<L>  |  0.86  02-02    136  |  100  |  34<H>  ----------------------------<  132<H>  3.4<L>   |  21<L>  |  0.87    Ca    9.0      06 Feb 2023 06:55  Ca    9.0      05 Feb 2023 07:30  Phos  2.8     02-06  Phos  2.2     02-05    TPro  6.5  /  Alb  3.5  /  TBili  1.3<H>  /  DBili  x   /  AST  68<H>  /  ALT  14  /  AlkPhos  61  02-02    CAPILLARY BLOOD GLUCOSE                      RECENT CULTURES:  02-01 @ 09:57 Clean Catch Clean Catch (Midstream)   JIM      Enterococcus faecium (vancomycin resistant)  Enterococcus faecium (vancomycin resistant)     >100,000 CFU/ml Enterococcus faecium (vancomycin resistant)    02-01 @ 09:35 .Blood Blood-Peripheral                No growth to date.    02-01 @ 09:20 .Blood Blood-Peripheral                No growth to date.    02-01 @ 02:04 Clean Catch Clean Catch (Midstream)   JIM      Enterococcus faecium (vancomycin resistant)  Enterococcus faecium (vancomycin resistant)     >100,000 CFU/ml Enterococcus faecium (vancomycin resistant)    01-31 @ 10:20 .Blood Blood-Peripheral   PCR    Growth in aerobic bottle: Gram positive cocci in pairs  Growth in anaerobic bottle: Gram positive cocci in pairs    Blood Culture PCR  Enterococcus faecalis  Blood Culture PCR     Growth in aerobic bottle: Enterococcus faecalis  Growth in anaerobic bottle: Staphylococcus epidermidis  ***Blood Panel PCR results on this specimen are available  approximately 3 hours after the Gram stain result.***  Gram stain, PCR, and/or culture results may not always  correspond due to difference in methodologies.  ************************************************************  This PCR assay was performed by multiplex PCR. This  Assay tests for 66 bacterial and resistance gene targets.  Please refer to the [facility] Labs test directory  at [email or web address] for details.          RESPIRATORY CULTURES:        rad< from: Xray Abdomen 1 View PORTABLE -Routine (Xray Abdomen 1 View PORTABLE -Routine .) (02.03.23 @ 15:26) >    ACC: [id number] EXAM:  XR ABDOMEN PORTABLE ROUTINE 1V   ORDERED BY: LILIA BELLO     PROCEDURE DATE:  02/03/2023          INTERPRETATION:  CLINICAL INFORMATION: Constipation/diarrhea.    EXAM: single frontal view of the abdomen.    COMPARISON: CT abdomen pelvis 1/18/2023    FINDINGS:  Nonobstructive bowel gas pattern. Moderate volume of stool throughout the   colon and rectum..  There is no evidence of intraperitoneal free air on this single supine   radiograph.  The visualized osseous structures demonstrate no acute pathology.   Vertebral body compression deformity and degenerative changes in the   lumbar spine.    IMPRESSION:  Nonobstructive bowel gas pattern. Moderate stool burden.    --- End of Report ---           SHARAD SHEPHERD MD; Resident Radiologist  This document has been electronically signed.  LOAN TODD MD; Attending Radiologist  This document has been electronically signed. Feb  3 2023  3:57PM    < end of copied text >  < from: Xray Chest 1 View- PORTABLE-Urgent (Xray Chest 1 View- PORTABLE-Urgent .) (02.01.23 @ 11:14) >    ACC: [id number] EXAM:  XR CHEST PORTABLE URGENT 1V   ORDERED BY: ALICIA GEORGE     PROCEDURE DATE:  02/01/2023          INTERPRETATION:  EXAMINATION: XR CHEST URGENT    CLINICAL INDICATION: r/o aspiration, confusion    TECHNIQUE: Single frontal,portable view of the chest was obtained.    COMPARISON: Chest x-ray 1/30/2023    FINDINGS:    LINES/TUBES: Left chest wall medication port with tip in the right atrium.    LUNGS/PLEURA: Increase in bilateral lower lung hazy opacity. Question   trace right effusion. No left pleural effusion. No pneumothorax.    HEART AND MEDIASTINUM: The heart size is not accurately measured in this   projection.    SKELETON: No acute osseous abnormalities.    IMPRESSION:    Bibasilar hazy opacification. This may represent atelectasis or pneumonia    --- End of Report ---          JAMIE DURAN MD; Resident Radiologist  This document has been electronically signed.  LOAN TODD MD; Attending Radiologist  This document has been electronically signed. Feb 1 2023  4:03PM    < end of copied text >    Studies  Chest X-RAY  CT SCAN Chest   Venous Dopplers: LE:   CT Abdomen  Others

## 2023-02-07 NOTE — PROGRESS NOTE ADULT - NSPROGADDITIONALINFOA_GEN_ALL_CORE
d/w pt and NP.   d/w ID. d/w the son Levon.     Roge Cooper will be covering for the pt starting 2/8/23. He can be reached at  if needed.     - Dr. MARY Arias ([facility])  - [phone]

## 2023-02-07 NOTE — PROGRESS NOTE ADULT - SUBJECTIVE AND OBJECTIVE BOX
Date of Service: 02-07-23 @ 14:31    Patient is a 82y old  Female who presents with a chief complaint of weakness (07 Feb 2023 14:17)      Any change in ROS:  doing ok: no sob    MEDICATIONS  (STANDING):  budesonide 160 MICROgram(s)/formoterol 4.5 MICROgram(s) Inhaler 2 Puff(s) Inhalation two times a day  chlorhexidine 2% Cloths 1 Application(s) Topical daily  cholecalciferol 2000 Unit(s) Oral daily  citalopram 10 milliGRAM(s) Oral daily  fluticasone propionate 50 MICROgram(s)/spray Nasal Spray 1 Spray(s) Both Nostrils two times a day  folic acid 1 milliGRAM(s) Oral daily  guaiFENesin  milliGRAM(s) Oral every 12 hours  influenza  Vaccine (HIGH DOSE) 0.7 milliLiter(s) IntraMuscular once  ipratropium    for Nebulization 500 MICROGram(s) Nebulizer every 6 hours  lactobacillus acidophilus 1 Tablet(s) Oral daily  metoprolol tartrate 25 milliGRAM(s) Oral two times a day  mirtazapine 7.5 milliGRAM(s) Oral daily  nitrofurantoin monohydrate/macrocrystals (MACROBID) 100 milliGRAM(s) Oral two times a day  pantoprazole    Tablet 40 milliGRAM(s) Oral before breakfast  potassium chloride   Powder 40 milliEquivalent(s) Oral once  potassium phosphate / sodium phosphate Powder (PHOS-NaK) 1 Packet(s) Oral once  predniSONE   Tablet 20 milliGRAM(s) Oral daily  senna 1 Tablet(s) Oral daily  sodium chloride 0.65% Nasal 2 Spray(s) Both Nostrils every 6 hours  trimethoprim  160 mG/sulfamethoxazole 800 mG 1 Tablet(s) Oral daily  vancomycin  IVPB 1000 milliGRAM(s) IV Intermittent every 24 hours    MEDICATIONS  (PRN):  acetaminophen     Tablet .. 650 milliGRAM(s) Oral every 6 hours PRN Temp greater or equal to 38C (100.4F), Mild Pain (1 - 3)  aluminum hydroxide/magnesium hydroxide/simethicone Suspension 30 milliLiter(s) Oral every 4 hours PRN Dyspepsia  benzocaine/menthol Lozenge 1 Lozenge Oral every 8 hours PRN Sore Throat  melatonin 3 milliGRAM(s) Oral at bedtime PRN Insomnia  ondansetron Injectable 4 milliGRAM(s) IV Push every 8 hours PRN Nausea and/or Vomiting  polyethylene glycol 3350 17 Gram(s) Oral daily PRN Constipation    Vital Signs Last 24 Hrs  T(C): 36.7 (07 Feb 2023 11:27), Max: 36.8 (07 Feb 2023 05:15)  T(F): 98.1 (07 Feb 2023 11:27), Max: 98.2 (07 Feb 2023 05:15)  HR: 83 (07 Feb 2023 11:27) (75 - 91)  BP: 109/63 (07 Feb 2023 11:27) (109/63 - 123/68)  BP(mean): --  RR: 17 (07 Feb 2023 11:27) (17 - 18)  SpO2: 94% (07 Feb 2023 11:27) (94% - 97%)    Parameters below as of 07 Feb 2023 11:27  Patient On (Oxygen Delivery Method): room air        I&O's Summary    06 Feb 2023 07:01  -  07 Feb 2023 07:00  --------------------------------------------------------  IN: 0 mL / OUT: 100 mL / NET: -100 mL    07 Feb 2023 07:01  -  07 Feb 2023 14:31  --------------------------------------------------------  IN: 0 mL / OUT: 200 mL / NET: -200 mL          Physical Exam:   GENERAL: NAD, well-groomed, well-developed  HEENT: RANJIT/   Atraumatic, Normocephalic  ENMT: No tonsillar erythema, exudates, or enlargement; Moist mucous membranes, Good dentition, No lesions  NECK: Supple, No JVD, Normal thyroid  CHEST/LUNG: Clear to auscultaion  CVS: Regular rate and rhythm; No murmurs, rubs, or gallops  GI: : Soft, Nontender, Nondistended; Bowel sounds present  NERVOUS SYSTEM:  Alert & Oriented X3  EXTREMITIES:-edema  LYMPH: No lymphadenopathy noted  SKIN: No rashes or lesions  ENDOCRINOLOGY: No Thyromegaly  PSYCH: Appropriate    Labs:  21                            8.9    31.08 )-----------( 32       ( 07 Feb 2023 07:01 )             26.4                         10.4   28.03 )-----------( 13       ( 06 Feb 2023 06:58 )             31.2                         10.1   28.78 )-----------( 26       ( 05 Feb 2023 07:33 )             29.6                         11.1   33.58 )-----------( 8        ( 04 Feb 2023 07:03 )             32.2     02-07    137  |  100  |  17  ----------------------------<  67<L>  3.2<L>   |  27  |  0.61  02-06    127<L>  |  95<L>  |  22  ----------------------------<  401<H>  4.5   |  18<L>  |  0.60  02-06    139  |  104  |  20  ----------------------------<  74  3.7   |  22  |  0.59  02-05    141  |  103  |  32<H>  ----------------------------<  72  3.1<L>   |  26  |  0.68  02-04    134<L>  |  99  |  36<H>  ----------------------------<  64<L>  3.6   |  19<L>  |  0.86    Ca    8.6      07 Feb 2023 07:01  Ca    8.4      06 Feb 2023 12:38  Ca    9.0      06 Feb 2023 06:55  Phos  2.3     02-07  Phos  2.8     02-06  Mg     1.6     02-07    TPro  6.0  /  Alb  3.4  /  TBili  0.7  /  DBili  x   /  AST  19  /  ALT  7<L>  /  AlkPhos  63  02-07  TPro  6.1  /  Alb  3.4  /  TBili  0.7  /  DBili  0.2  /  AST  24  /  ALT  8<L>  /  AlkPhos  69  02-06    CAPILLARY BLOOD GLUCOSE          LIVER FUNCTIONS - ( 07 Feb 2023 07:01 )  Alb: 3.4 g/dL / Pro: 6.0 g/dL / ALK PHOS: 63 U/L / ALT: 7 U/L / AST: 19 U/L / GGT: x                     RECENT CULTURES:  02-01 @ 09:57 Clean Catch Clean Catch (Midstream)   JIM      Enterococcus faecium (vancomycin resistant)  Enterococcus faecium (vancomycin resistant)     >100,000 CFU/ml Enterococcus faecium (vancomycin resistant)    02-01 @ 09:35 .Blood Blood-Peripheral                No Growth Final    02-01 @ 09:20 .Blood Blood-Peripheral       rad< from: Xray Chest 1 View- PORTABLE-Urgent (Xray Chest 1 View- PORTABLE-Urgent .) (02.01.23 @ 11:14) >    ACC: [id number] EXAM:  XR CHEST PORTABLE URGENT 1V   ORDERED BY: ALICIA GEORGE     PROCEDURE DATE:  02/01/2023          INTERPRETATION:  EXAMINATION: XR CHEST URGENT    CLINICAL INDICATION: r/o aspiration, confusion    TECHNIQUE: Single frontal,portable view of the chest was obtained.    COMPARISON: Chest x-ray 1/30/2023    FINDINGS:    LINES/TUBES: Left chest wall medication port with tip in the right atrium.    LUNGS/PLEURA: Increase in bilateral lower lung hazy opacity. Question   trace right effusion. No left pleural effusion. No pneumothorax.    HEART AND MEDIASTINUM: The heart size is not accurately measured in this   projection.    SKELETON: No acute osseous abnormalities.    IMPRESSION:    Bibasilar hazy opacification. This may represent atelectasis or pneumonia    --- End of Report ---          JAMIE DURAN MD; Resident Radiologist  This document has been electronically signed.  LOAN TODD MD; Attending Radiologist  This document has been electronically signed. Feb 1 2023  4:03PM    < end of copied text >           No Growth Final    02-01 @ 02:04 Clean Catch Clean Catch (Midstream)   JIM      Enterococcus faecium (vancomycin resistant)  Enterococcus faecium (vancomycin resistant)     >100,000 CFU/ml Enterococcus faecium (vancomycin resistant)          RESPIRATORY CULTURES:          Studies  Chest X-RAY  CT SCAN Chest   Venous Dopplers: LE:   CT Abdomen  Others

## 2023-02-07 NOTE — PROGRESS NOTE ADULT - SUBJECTIVE AND OBJECTIVE BOX
[facility]   MD RONEL FRIAS MD, DO KARELY DANIELSON NP    TEL:  OFFICE: [phone]    From 5pm-7am Answering Service [phone]    -- RENAL FOLLOW UP NOTE ---Date of Service 02-07-23 @ 12:48    Patient is a 82y old  Female who presents with a chief complaint of weakness (07 Feb 2023 09:21)      Patient seen and examined at bedside. No chest pain/sob    VITALS:  T(F): 98.1 (02-07-23 @ 11:27), Max: 98.2 (02-07-23 @ 05:15)  HR: 83 (02-07-23 @ 11:27)  BP: 109/63 (02-07-23 @ 11:27)  RR: 17 (02-07-23 @ 11:27)  SpO2: 94% (02-07-23 @ 11:27)  Wt(kg): --    02-06 @ 07:01  -  02-07 @ 07:00  --------------------------------------------------------  IN: 0 mL / OUT: 100 mL / NET: -100 mL    02-07 @ 07:01  -  02-07 @ 12:48  --------------------------------------------------------  IN: 0 mL / OUT: 200 mL / NET: -200 mL          PHYSICAL EXAM:  Constitutional: NAD  Neck: No JVD  Respiratory: CTAB, no wheezes, rales or rhonchi  Cardiovascular: S1, S2, RRR  Gastrointestinal: BS+, soft, NT/ND  Extremities: No peripheral edema    Hospital Medications:   MEDICATIONS  (STANDING):  budesonide 160 MICROgram(s)/formoterol 4.5 MICROgram(s) Inhaler 2 Puff(s) Inhalation two times a day  chlorhexidine 2% Cloths 1 Application(s) Topical daily  cholecalciferol 2000 Unit(s) Oral daily  citalopram 10 milliGRAM(s) Oral daily  fluticasone propionate 50 MICROgram(s)/spray Nasal Spray 1 Spray(s) Both Nostrils two times a day  folic acid 1 milliGRAM(s) Oral daily  guaiFENesin  milliGRAM(s) Oral every 12 hours  influenza  Vaccine (HIGH DOSE) 0.7 milliLiter(s) IntraMuscular once  ipratropium    for Nebulization 500 MICROGram(s) Nebulizer every 6 hours  lactobacillus acidophilus 1 Tablet(s) Oral daily  metoprolol tartrate 25 milliGRAM(s) Oral two times a day  mirtazapine 7.5 milliGRAM(s) Oral daily  nitrofurantoin monohydrate/macrocrystals (MACROBID) 100 milliGRAM(s) Oral two times a day  pantoprazole    Tablet 40 milliGRAM(s) Oral before breakfast  potassium chloride   Powder 40 milliEquivalent(s) Oral once  potassium phosphate / sodium phosphate Powder (PHOS-NaK) 1 Packet(s) Oral once  predniSONE   Tablet 20 milliGRAM(s) Oral daily  senna 1 Tablet(s) Oral daily  sodium bicarbonate 650 milliGRAM(s) Oral three times a day  sodium bicarbonate  Infusion 0.108 mEq/kG/Hr (75 mL/Hr) IV Continuous <Continuous>  sodium chloride 0.65% Nasal 2 Spray(s) Both Nostrils every 6 hours  trimethoprim  160 mG/sulfamethoxazole 800 mG 1 Tablet(s) Oral daily  vancomycin  IVPB 1000 milliGRAM(s) IV Intermittent every 24 hours      LABS:  02-07    137  |  100  |  17  ----------------------------<  67<L>  3.2<L>   |  27  |  0.61    Ca    8.6      07 Feb 2023 07:01  Phos  2.3     02-07  Mg     1.6     02-07    TPro  6.0  /  Alb  3.4  /  TBili  0.7  /  DBili      /  AST  19  /  ALT  7<L>  /  AlkPhos  63  02-07    Creatinine Trend: 0.61 <--, 0.60 <--, 0.59 <--, 0.68 <--, 0.86 <--, 0.87 <--, 1.02 <--, 1.05 <--    Phosphorus Level, Serum: 2.3 mg/dL (02-07 @ 07:01)  Albumin, Serum: 3.4 g/dL (02-07 @ 07:01)                              8.9    31.08 )-----------( 32       ( 07 Feb 2023 07:01 )             26.4     Urine Studies:  Urinalysis - [02-01-23 @ 09:57]      Color Yellow / Appearance Slightly Turbid / SG 1.033 / pH 7.0      Gluc Negative / Ketone Trace  / Bili Negative / Urobili Negative       Blood Small / Protein 300 mg/dL / Leuk Est Negative / Nitrite Negative      RBC 4 /  / Hyaline 4 / Gran  / Sq Epi  / Non Sq Epi 10 / Bacteria Moderate    Urine Sodium 169      [02-07-23 @ 07:13]  Urine Osmolality 814      [02-07-23 @ 07:13]    Iron 152, TIBC 180, %sat 84      [01-19-23 @ 11:19]  Ferritin 5768      [01-19-23 @ 11:19]  Vitamin D (25OH) 28.1      [01-19-23 @ 11:19]  Lipid: chol 84, TG 81, HDL 22, LDL --      [10-01-22 @ 07:10]    HBsAg Nonreact      [01-25-23 @ 07:18]  HBcAb Nonreact      [01-25-23 @ 07:18]  HCV 0.15, Nonreact      [01-25-23 @ 07:18]    MPO-ANCA <5.0, interpretation: Negative      [02-02-23 @ 07:22]  PR3-ANCA <5.0, interpretation: Negative      [02-02-23 @ 07:22]  Free Light Chains: kappa 4.65, lambda 2.60, ratio = 1.79      [01-25 @ 07:18]    RADIOLOGY & ADDITIONAL STUDIES:

## 2023-02-07 NOTE — PROGRESS NOTE ADULT - ASSESSMENT
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 She presented here from [facility] for elevated WBC and low hemoglobin, severe weakness. Pt states for the past 2-3 days has been having increased weakness and lethargy, decreased appetite. +sputum productive cough. + cui, no sob, no difficulty breathing. No abdominal pain, nausea, vomiting, no bloody stools. Has endorsed multiple episodes of loose stools x weeks, currently being treated for c.diff with oral vancomycin.       Fatigue/dyspnea: likely due to severe anemia :  pt s/p 1 unit PRB  Diarrhea  Pulmonary vasculitis  :  hx of Tachycardia :  Recent TTE on 11/3/22 reviewed: normal LV. outpt card Dr. Ady Cooper  Anxiety and depression  GERD (gastroesophageal reflux disease)  Hyperlipidemia.   DVT ppx     1/20:  Fatigue/dyspnea: likely due to severe anemia :  pt s/p 1 unit PRBC: hb now  > 10  : she is on 2 L of oxygen : no wheezing: no overt signs of bleeding : ct chest is abnormal report noted:  has jean-claude ill defined opacities of unclear etiology  : venous ph is mild acidotic:  no need for Bipap at this time : monitor and trend hb  Diarrhea : symptomatic rx:  workup per primary team  Pulmonary vasculitis  : per rheumatology  : had recent vats surgery : LN biopsy noted:   hx of Tachycardia :  Recent TTE on 11/3/22 reviewed: normal LV. outpt card Dr. Ady Cooper  Anxiety and depression  GERD (gastroesophageal reflux disease) : ppi   Hyperlipidemia.   recent covid  : o n last admission she was treated for covid infection:   DVT ppx   d wteam    1/22:    Fatigue/dyspnea: likely due to severe anemia :  pt s/p 1 unit PRBC: hb now  > 10  : she is on 2 L of oxygen : no wheezing: no overt signs of bleeding : ct chest is abnormal report noted:  has jean-claude ill defined opacities of unclear etiology  : venous ph is mild acidotic:  no need for Bipap at this time : monitor and trend hb: she remained stable o gracie last 1 days:  she is not on any antibtiocs at this time: RVP  and blood cultures are negative: she had ebus biopsy also before: reviewed: ID following  Diarrhea : symptomatic rx:  workup per primary team : seems to have resolved  Pulmonary vasculitis  : per rheumatology  : had recent vats surgery : LN biopsy noted:   Bicytopneia and leucocytosis: ? etiology  : for possible bone marrow biopsy on Monday    hx of Tachycardia :  Recent TTE on 11/3/22 reviewed: normal LV. outpt card Dr. Ady Cooper  Anxiety and depression  GERD (gastroesophageal reflux disease) : ppi   Hyperlipidemia.   recent covid  : on last admission she was treated for covid infection:   DVT ppx   dw team    1/23:    Fatigue/dyspnea: likely due to severe anemia : feels weak  but no bleeding noted:  on 2 L of oxygen : she needs PT OT   Diarrhea :resolved:   Pulmonary vasculitis  : per rheumatology  : had recent vats surgery : LN biopsy noted: : she never received teat ment for vasculitis except low dose steroids:  she is awaiting bone marrow biopsy prior to induction therapy with rituximab: The ct chest done on this admission does not show pneumothorax and has jean-claude effusions:  There are some new opacites in rigth lower lobe which were not therre:  clinically she does not seem to have pneumonia: ID following: could it be a prt of vasculitis  Bicytopneia and leucocytosis: ? etiology  : for possible bone marrow biopsy today  hx of Tachycardia :  Recent TTE on 11/3/22 reviewed: normal LV. outpt card Dr. Ady Cooper  Anxiety and depression  GERD (gastroesophageal reflux disease) : ppi   Hyperlipidemia.   recent covid  : on last admission she was treated for covid infection:   DVT ppx   dw team    1/24:    Fatigue/dyspnea: likely due to severe anemia : feels weak  but no bleeding noted:  on 2 L of oxygen : she needs PT OT   Diarrhea :resolved:   Pulmonary vasculitis  : per rheumatology  : had recent vats surgery : LN biopsy noted: : she never received teat ment for vasculitis except low dose steroids:  she is awaiting bone marrow biopsy prior to induction therapy with rituximab: The ct chest done on this admission does not show pneumothorax and has jean-claude effusions:  There are some new opacites in rigth lower lobe which were not therre:  clinically she does not seem to have pneumonia: ID following: could it be a prt of vasculitis  Bicytopneia and leucocytosis: BM biopsy done: await results for eventual immunosuppressives therapy:   hx of Tachycardia :  Recent TTE on 11/3/22 reviewed: normal LV. outpt card Dr. Ady Cooper  Anxiety and depression  GERD (gastroesophageal reflux disease) : ppi   Hyperlipidemia.   recent covid  : on last admission she was treated for covid infection:   DVT ppx   dw team    1/25:    Fatigue/dyspnea: likely due to severe anemia : feels weak  but no bleeding noted:  on 2 L of oxygen : she needs PT OT   Diarrhea :resolved:   Pulmonary vasculitis  : per rheumatology  : had recent vats surgery : LN biopsy noted: : she never received teat ment for vasculitis except low dose steroids:  she is awaiting bone marrow biopsy prior to induction therapy with rituximab: The ct chest done on this admission does not show pneumothorax and has jean-claude effusions:  There are some new opacites in rigth lower lobe which were not there:  clinically she does not seem to have pneumonia: ID following: could it be a part of vasculitis : her resp status has not changed   Bicytopneia and leucocytosis: BM biopsy done: await results for still pending   hx of Tachycardia :  Recent TTE on 11/3/22 reviewed: normal LV. outpt card Dr. Ady Cooper  Anxiety and depression  GERD (gastroesophageal reflux disease) : ppi   Hyperlipidemia.   recent covid  : on last admission she was treated for covid infection:   DVT ppx   dw team: awaiting decision on immunosuppression     1/26;      Fatigue/dyspnea: likely due to severe anemia : feels weak  but no bleeding noted:  on 2 L of oxygen : she needs PT OT : got it yesterday    Diarrhea :resolved:   Pulmonary vasculitis  : per rheumatology  : had recent vats surgery : LN biopsy noted: : she never received teat ment for vasculitis except low dose steroids:  she is awaiting bone marrow biopsy prior to induction therapy with rituximab: The ct chest done on this admission does not show pneumothorax and has jean-claude effusions:  There are some new opacities in rigth lower lobe which were not there:  clinically she does not seem to have pneumonia: ID following: could it be a part of vasculitis : her resp status has not changed  : being observed off antibiotics   Bicytopneia and leucocytosis: BM biopsy done: await results for still pending   hx of Tachycardia :  Recent TTE on 11/3/22 reviewed: normal LV. outpt card Dr. Ady Cooper  Anxiety and depression  GERD (gastroesophageal reflux disease) : ppi   Hyperlipidemia.   recent covid  : on last admission she was treated for covid infection:   DVT ppx   dw team: awaiting decision on immunosuppression     1/27:    Fatigue/dyspnea: likely due to severe anemia : feels weak  but no bleeding noted:  on 2 L of oxygen : cont  PT OT :   Diarrhea :resolved:   Pulmonary vasculitis  : per rheumatology  : had recent vats surgery : LN biopsy noted: : she never received teat ment for vasculitis except low dose steroids:  she is awaiting bone marrow biopsy prior to induction therapy with rituximab: The ct chest done on this admission does not show pneumothorax and has jean-claude effusions:  There are some new opacities in rigth lower lobe which were not there:  clinically she does not seem to have pneumonia: ID following: could it be a part of vasculitis : her resp status has not changed  : being observed off antibiotics :  on bactrim prophylaxis as she is on chr steroids   Bicytopneia and leucocytosis: BM biopsy done: await results for still pending   hx of Tachycardia :  Recent TTE on 11/3/22 reviewed: normal LV. outpt card Dr. Ady Cooper  Anxiety and depression  GERD (gastroesophageal reflux disease) : ppi   Hyperlipidemia.   recent covid  : on last admission she was treated for covid infection:   DVT ppx   dw team: awaiting decision on immunosuppression     1/29:    Fatigue/dyspnea: likely due to severe anemia : feels weak  but no bleeding noted:  on 2 L of oxygen : cont  PT OT :   Diarrhea :resolved:   Pulmonary vasculitis  : per rheumatology  : had recent vats surgery : LN biopsy noted: : she never received treat ment for vasculitis except low dose steroids:  she is awaiting bone marrow biopsy prior to induction therapy with rituximab: The ct chest done on this admission does not show pneumothorax and has jean-claude effusions:  There are some new opacities in rigth lower lobe which were not there:  clinically she does not seem to have pneumonia: ID following: could it be a part of vasculitis : her resp status has not changed  : being observed off antibiotics :  on bactrim prophylaxis as she is on chr steroids   Bicytopneia and leucocytosis: BM biopsy: results reviewed defer to [facility]  hx of Tachycardia :  Recent TTE on 11/3/22 reviewed: normal LV.   Anxiety and depression  GERD (gastroesophageal reflux disease) : ppi   Hyperlipidemia.   recent covid  : on last admission she was treated for covid infection:   DVT ppx   dw team: awaiting decision on immunosuppression     1/30:    Fatigue/dyspnea: likely due to severe anemia : feels weak  but no bleeding noted:  on 2 L of oxygen : cont  PT OT : sitting in chair today   Diarrhea :resolved:   Pulmonary vasculitis  : per rheumatology  : had recent vats surgery : LN biopsy noted: : she never received treat ment for vasculitis except low dose steroids:  she is awaiting bone marrow biopsy prior to induction therapy with rituximab: The ct chest done on this admission does not show pneumothorax and has jean-claude effusions:  There are some new opacities in rigth lower lobe which were not there:  clinically she does not seem to have pneumonia: ID following: could it be a part of vasculitis : her resp status has not changed  : being observed off antibiotics :  on bactrim prophylaxis as she is on chr steroids  /l however she had low grade tempt today : rvp is negative : cultures sent: ID follow up   Bicytopneia and leucocytosis: BM biopsy: results reviewed defer to [facility]  hx of Tachycardia :  Recent TTE on 11/3/22 reviewed: normal LV.   Anxiety and depression  GERD (gastroesophageal reflux disease) : ppi   Hyperlipidemia.   recent covid  : on last admission she was treated for covid infection:   DVT ppx   dw team: awaiting decision on immunosuppression     1/31:    Fatigue/dyspnea: likely due to severe anemia : feels weak  but no bleeding noted:  on 2 L of oxygen : cont  PT OT : lying in bed:   Diarrhea :resolved:   Pulmonary vasculitis  : per rheumatology  : had recent vats surgery : LN biopsy noted: : she never received treat ment for vasculitis except low dose steroids:  she is awaiting bone marrow biopsy prior to induction therapy with rituximab: The ct chest done on this admission does not show pneumothorax and has jean-claude effusions:  There are some new opacities in rigth lower lobe which were not there:  clinically she does not seem to have pneumonia: ID following: could it be a part of vasculitis : her resp status has not changed  : being observed off antibiotics :  on bactrim prophylaxis as she is on chr steroids : she seems OK:  no sob:     Bicytopneia and leucocytosis: BM biopsy: results reviewed defer to [facility] ; for eventual chemo   hx of Tachycardia :  Recent TTE on 11/3/22 reviewed: normal LV.   Anxiety and depression  GERD (gastroesophageal reflux disease) : ppi   Hyperlipidemia.   recent covid  : on last admission she was treated for covid infection:   DVT ppx   dw team: awaiting decision on immunosuppression     2/1:    Fatigue/dyspnea: likely due to severe anemia : feels weak  but no bleeding noted:  on 2 L of oxygen : cont  PT OT : lying in bed:  her blood cultures are contaminant:   Diarrhea :resolved:   Pulmonary vasculitis  : per rheumatology  : had recent vats surgery : LN biopsy noted: : she never received treat ment for vasculitis except low dose steroids:  she is awaiting bone marrow biopsy prior to induction therapy with rituximab: The ct chest done on this admission does not show pneumothorax and has jean-claude effusions:  There are some new opacities in rigth lower lobe which were not there:  clinically she does not seem to have pneumonia: ID following: could it be a part of vasculitis : her resp status has not changed  : being observed off antibiotics :  on bactrim prophylaxis as she is on chr steroids : she seems OK:  no sob:     Bicytopneia and leucocytosis: BM biopsy: results reviewed defer to [facility] ; for eventual chemo   hx of Tachycardia :  Recent TTE on 11/3/22 reviewed: normal LV.   Anxiety and depression  GERD (gastroesophageal reflux disease) : ppi   Hyperlipidemia.   recent covid  : on last admission she was treated for covid infection:   DVT ppx   dw team: awaiting decision on immunosuppression: overall her general condition seems very poor and very weak:     2/2:    Fatigue/dyspnea: likely due to severe anemia : feels weak  but no bleeding noted:  on 2 L of oxygen : cont  PT OT : lying in bed:  her blood cultures are positive E FAECALIS :  ON MEROPENEM  Diarrhea :resolved:   Pulmonary vasculitis  : per rheumatology  : had recent vats surgery : LN biopsy noted: : she never received treat ment for vasculitis except low dose steroids:  she is awaiting bone marrow biopsy prior to induction therapy with rituximab: The ct chest done on this admission does not show pneumothorax and has jean-claude effusions:  There are some new opacities in rigth lower lobe which were not there:  clinically she does not seem to have pneumonia: ID following: could it be a part of vasculitis : her resp status has not changed  : being observed off antibiotics :  on bactrim prophylaxis as she is on chr steroids : she seems OK:  no sob:     Bicytopneia and leucocytosis: BM biopsy: results reviewed defer to [facility] ; for eventual chemo   hx of Tachycardia :  Recent TTE on 11/3/22 reviewed: normal LV.   Anxiety and depression  GERD (gastroesophageal reflux disease) : ppi   Hyperlipidemia.   recent covid  : on last admission she was treated for covid infection:   DVT ppx   dw team: awaiting decision on immunosuppression: overall her general condition seems very poor and very weak: ON ANTIBIOTICS     2/3   Pulmonary Vasculitis  -Steroids per rheum  -Plan for eventual Rituxan  -Bactrim for PCP ppx  MDS  -Per heme/onc  Bacteremia  -E. Faecalis bacteremia  -ABX per ID  Epistaxis  -Per ENT  -Breathing comfortably on 40% humidified face tent, keep sats >90%    2/6:    2/6  Pulmonary Vasculitis  -Steroids per rheum  -Plan for eventual Rituxan /l she is on room air at this time  -Bactrim for PCP ppx  MDS  -Per heme/onc  Bacteremia  -E. Faecalis bacteremia  -ABX per ID  Epistaxis  -Per ENT  - she is on room air today  : cont to mantain o2 sao2 above 90% all the ti me:    deim acp      2/7:  Pulmonary Vasculitis  -Steroids per rheum  -Plan for eventual Rituxan /l she is on room air at this time  -Bactrim for PCP ppx  MDS  -Per heme/onc  Bacteremia  -E. Faecalis bacteremia  -ABX per ID : awaiting clearance from id for chemo : last blood cultures have been negative  Epistaxis  -Per ENT  - she is on room air today  : cont to mantain o2 sao2 above 90% all the ti me:    demi acp

## 2023-02-07 NOTE — PROGRESS NOTE ADULT - SUBJECTIVE AND OBJECTIVE BOX
SUBJECTIVE/ OVERNIGHT EVENTS:  feels well  no cp, no sob, no n/v/d. no abdominal pain.  no headache, no dizziness.   the son Levon at bedside.  BM yesterday per pt.  no melena, no hematochezia. no BRBPR.       --------------------------------------------------------------------------------------------  LABS:                        8.9    31.08 )-----------( 32       ( 07 Feb 2023 07:01 )             26.4     02-07    137  |  100  |  17  ----------------------------<  67<L>  3.2<L>   |  27  |  0.61    Ca    8.6      07 Feb 2023 07:01  Phos  2.3     02-07  Mg     1.6     02-07    TPro  6.0  /  Alb  3.4  /  TBili  0.7  /  DBili  x   /  AST  19  /  ALT  7<L>  /  AlkPhos  63  02-07      CAPILLARY BLOOD GLUCOSE                RADIOLOGY & ADDITIONAL TESTS:    Imaging Personally Reviewed:  [x] YES  [ ] NO    Consultant(s) Notes Reviewed:  [x] YES  [ ] NO    MEDICATIONS  (STANDING):  budesonide 160 MICROgram(s)/formoterol 4.5 MICROgram(s) Inhaler 2 Puff(s) Inhalation two times a day  chlorhexidine 2% Cloths 1 Application(s) Topical daily  cholecalciferol 2000 Unit(s) Oral daily  citalopram 10 milliGRAM(s) Oral daily  fluticasone propionate 50 MICROgram(s)/spray Nasal Spray 1 Spray(s) Both Nostrils two times a day  folic acid 1 milliGRAM(s) Oral daily  guaiFENesin  milliGRAM(s) Oral every 12 hours  influenza  Vaccine (HIGH DOSE) 0.7 milliLiter(s) IntraMuscular once  ipratropium    for Nebulization 500 MICROGram(s) Nebulizer every 6 hours  lactobacillus acidophilus 1 Tablet(s) Oral daily  metoprolol tartrate 25 milliGRAM(s) Oral two times a day  mirtazapine 7.5 milliGRAM(s) Oral daily  nitrofurantoin monohydrate/macrocrystals (MACROBID) 100 milliGRAM(s) Oral two times a day  pantoprazole    Tablet 40 milliGRAM(s) Oral before breakfast  potassium chloride   Powder 40 milliEquivalent(s) Oral once  potassium phosphate / sodium phosphate Powder (PHOS-NaK) 1 Packet(s) Oral once  predniSONE   Tablet 20 milliGRAM(s) Oral daily  senna 1 Tablet(s) Oral daily  sodium chloride 0.65% Nasal 2 Spray(s) Both Nostrils every 6 hours  trimethoprim  160 mG/sulfamethoxazole 800 mG 1 Tablet(s) Oral daily  vancomycin  IVPB 1000 milliGRAM(s) IV Intermittent every 24 hours    MEDICATIONS  (PRN):  acetaminophen     Tablet .. 650 milliGRAM(s) Oral every 6 hours PRN Temp greater or equal to 38C (100.4F), Mild Pain (1 - 3)  aluminum hydroxide/magnesium hydroxide/simethicone Suspension 30 milliLiter(s) Oral every 4 hours PRN Dyspepsia  benzocaine/menthol Lozenge 1 Lozenge Oral every 8 hours PRN Sore Throat  melatonin 3 milliGRAM(s) Oral at bedtime PRN Insomnia  ondansetron Injectable 4 milliGRAM(s) IV Push every 8 hours PRN Nausea and/or Vomiting  polyethylene glycol 3350 17 Gram(s) Oral daily PRN Constipation      Care Discussed with Consultants/Other Providers [x] YES  [ ] NO    Vital Signs Last 24 Hrs  T(C): 36.7 (07 Feb 2023 11:27), Max: 36.8 (07 Feb 2023 05:15)  T(F): 98.1 (07 Feb 2023 11:27), Max: 98.2 (07 Feb 2023 05:15)  HR: 83 (07 Feb 2023 11:27) (75 - 91)  BP: 109/63 (07 Feb 2023 11:27) (109/63 - 123/68)  BP(mean): --  RR: 17 (07 Feb 2023 11:27) (17 - 18)  SpO2: 94% (07 Feb 2023 11:27) (94% - 97%)    Parameters below as of 07 Feb 2023 11:27  Patient On (Oxygen Delivery Method): room air      I&O's Summary    06 Feb 2023 07:01  -  07 Feb 2023 07:00  --------------------------------------------------------  IN: 0 mL / OUT: 100 mL / NET: -100 mL    07 Feb 2023 07:01  -  07 Feb 2023 14:17  --------------------------------------------------------  IN: 0 mL / OUT: 200 mL / NET: -200 mL      PHYSICAL EXAM:  GENERAL: NAD, well-developed, comfortable on room air  HEAD:  Atraumatic, Normocephalic  EYES: EOMI, PERRLA, conjunctiva and sclera clear  NECK: Supple, No JVD  CHEST/LUNG: mild decrease breath sounds bilaterally; No wheeze   HEART: Regular rate and rhythm; No murmurs, rubs, or gallops  ABDOMEN: Soft, Nontender, Nondistended; Bowel sounds present  Neuro: awake alert, back to baseline mental status. no focal weakness  EXTREMITIES:  2+ Peripheral Pulses, No clubbing, cyanosis, trace b/l edema  SKIN: superficial ecchymosis. no bleeding.

## 2023-02-07 NOTE — PROGRESS NOTE ADULT - ASSESSMENT
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 She presented here from [facility] for elevated WBC and low hemoglobin, severe weakness. Pt states for the past 2-3 days has been having increased weakness and lethargy, decreased appetite. Reports chronic cough, currently nonproductive - RVP/COVID negative. Has multiple episodes of loose stools x weeks, reported recently trying marijuana for appetite and noted worsening. She was being treated empirically for c.diff with oral vancomycin but then became constipated, s/p bowel regimen and having normal bowel movements. Patient initially being monitored off antibiotics given she was afebrile, WBC was stable and no infectious source was found. She had a fever 100.9F on 1/30 overnight with worsening leukocytosis and then 101.4F overnight 1/31 and noted with confusion.     Sepsis due to gram positive bacteremia    E. faecalis bacteremia - unclear source, ?GI, less likely    Staph epi bacteremia ?skin vs port source vs contamination  AMS - neurotoxicity d/t cefepime vs infectious etiology   --d/c cefepime 1/31, mental status improved   H/o PCN allergy with hives  - sepsis resolved - afebrile, WBC downtrended/stable, Bcx cleared   - L chest port without sign of infection  - 2/1 CXR with bibasilar hazy opacification - may be atelectasis or pneumonia   - 1/30 Bcx (1 set sent) with Staph epi - MRSE -- sensitivities noted   - 1/31 Bcx (1 set sent) - aerobic bottle grew E. faecalis (amp and vanc sensitive) and MRSE  - 2/1 repeat Bcx -- Negative x2   - TTE w/o mention of vegetations  - continue vancomycin 1g IV Q24h -- complete total 10d course from negative Bcx - end 2/10   -- monitor vancomycin trough, goal trough 15-20 or -600  - monitor temps/CBC    UTI with E. faecium-VRE  - Ucx grew VRE-E. faecium, sensitivities reviewed   - continue on macrobid x 5 days - end tomorrow 2/8    Fatigue/dyspnea likely due to severe anemia due to MDS s/p transfusions  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  H/o DLBCL, EBV+   - Pulmonary, Rheumatology and Heme/Onc following   - noted patient had similar presentation with bicytopenia and leukocytosis in the past, may need tx with rituximab   - s/p BMBx 1/23 - found with myelodysplastic syndrome    - quantiferon indeterminate - pt on steroids which can cause indeterminate results    - CT - s/p wedge resections in b/l upper lobes, 2 cm nodular opacity a/w staple line in RUL; multiple ill-defined b/l opacities more in RLL -unclear etiology, b/l effusions R>L   - continue on Bactrim DS 1 tablet daily for PCP ppx while on prednisone    L epistaxis   - ENT following, packing removed      Yevgeniy Malave M.D.  [facility], Division of Infectious Diseases  [phone]  After 5pm on weekdays and all day on weekends - please call [phone] Banner Transposition Flap Text: The defect edges were debeveled with a #15 scalpel blade.  Given the location of the defect and the proximity to free margins a Banner transposition flap was deemed most appropriate.  Using a sterile surgical marker, an appropriate flap drawn around the defect. The area thus outlined was incised deep to adipose tissue with a #15 scalpel blade.  The skin margins were undermined to an appropriate distance in all directions utilizing iris scissors.

## 2023-02-07 NOTE — PROGRESS NOTE ADULT - SUBJECTIVE AND OBJECTIVE BOX
[facility] INFECTIOUS DISEASES  ANDREW Rodríguez Y. Patel, S. Shah, G. Casimir  [phone]  ([phone] - weekdays after 5pm and weekends)    Name: BETTIE NGUYEN  Age/Gender: 82y Female  MRN: [id number]    Interval History:  Patient seen and examined this morning.   Reports intermittent cough, no chest pain or dyspnea.   Denies fever, chills, pain, n/v/d.  Notes reviewed. Afebrile.     Allergies: codeine (Nausea)  doxycycline (Nausea)  penicillin (Hives)    Objective:  Vitals:   T(F): 98.2 (02-07-23 @ 05:15), Max: 98.4 (02-06-23 @ 12:45)  HR: 91 (02-07-23 @ 05:15) (75 - 91)  BP: 123/68 (02-07-23 @ 05:15) (111/69 - 123/68)  RR: 18 (02-07-23 @ 05:15) (18 - 18)  SpO2: 97% (02-07-23 @ 05:15) (95% - 97%)  Physical Examination:  General: no acute distress, on RA  HEENT: NC/AT, anicteric, dried blood on lips, neck supple  Cardio: S1, S2 present, normal rate  Resp: decreased breath sounds b/l  Abd: soft, NT, ND, + BS  Neuro: AAOx3, no obvious focal deficits  Ext: no edema, no cyanosis, moving extremities  Skin: warm, dry, no visible rash, ecchymosis   L chest port without erythema/TTP    Laboratory Studies:  CBC:                       8.9    31.08 )-----------( 32       ( 07 Feb 2023 07:01 )             26.4     WBC Trend:  31.08 02-07-23 @ 07:01  28.03 02-06-23 @ 06:58  28.78 02-05-23 @ 07:33  33.58 02-04-23 @ 07:03  49.00 02-02-23 @ 16:52  55.63 02-02-23 @ 02:35  47.65 02-01-23 @ 18:31  57.56 02-01-23 @ 07:11  64.59 02-01-23 @ 01:06  54.99 01-31-23 @ 18:35  73.44 01-31-23 @ 10:44    CMP: 02-07    137  |  100  |  17  ----------------------------<  67<L>  3.2<L>   |  27  |  0.61    Ca    8.6      07 Feb 2023 07:01  Phos  2.3     02-07  Mg     1.6     02-07    TPro  6.0  /  Alb  3.4  /  TBili  0.7  /  DBili  x   /  AST  19  /  ALT  7<L>  /  AlkPhos  63  02-07    Creatinine, Serum: 0.61 mg/dL (02-07-23 @ 07:01)  Creatinine, Serum: 0.60 mg/dL (02-06-23 @ 12:38)  Creatinine, Serum: 0.59 mg/dL (02-06-23 @ 06:55)  Creatinine, Serum: 0.68 mg/dL (02-05-23 @ 07:30)  Creatinine, Serum: 0.86 mg/dL (02-04-23 @ 07:03)  Creatinine, Serum: 0.87 mg/dL (02-02-23 @ 16:52)  Creatinine, Serum: 1.02 mg/dL (02-01-23 @ 07:11)  Creatinine, Serum: 1.05 mg/dL (02-01-23 @ 01:06)  Creatinine, Serum: 1.43 mg/dL (01-31-23 @ 10:44)    LIVER FUNCTIONS - ( 07 Feb 2023 07:01 )  Alb: 3.4 g/dL / Pro: 6.0 g/dL / ALK PHOS: 63 U/L / ALT: 7 U/L / AST: 19 U/L / GGT: x           Vancomycin Level, Random: 17.9 ug/mL (02-07-23 @ 07:01)  Vancomycin Level, Trough: 17.4 ug/mL (02-06-23 @ 09:40)  Vancomycin Level, Trough: 9.5 ug/mL (02-03-23 @ 10:28)    Microbiology: reviewed   Culture - Urine (collected 02-01-23 @ 09:57)  Source: Clean Catch Clean Catch (Midstream)  Final Report (02-04-23 @ 07:54):    >100,000 CFU/ml Enterococcus faecium (vancomycin resistant)  Organism: Enterococcus faecium (vancomycin resistant) (02-04-23 @ 07:54)  Organism: Enterococcus faecium (vancomycin resistant) (02-04-23 @ 07:54)      -  Ampicillin: R >8 Predicts results to ampicillin/sulbactam, amoxacillin-clavulanate and  piperacillin-tazobactam.      -  Ciprofloxacin: R >2      -  Daptomycin: N/A >4      -  Levofloxacin: R >4      -  Linezolid: S 2      -  Nitrofurantoin: S <=32 Should not be used to treat pyelonephritis.      -  Tetracycline: R >8      -  Vancomycin: R >16      Method Type: JIM    Culture - Blood (collected 02-01-23 @ 09:35)  Source: .Blood Blood-Peripheral  Final Report (02-06-23 @ 13:01):    No Growth Final    Culture - Blood (collected 02-01-23 @ 09:20)  Source: .Blood Blood-Peripheral  Final Report (02-06-23 @ 13:01):    No Growth Final    Culture - Urine (collected 02-01-23 @ 02:04)  Source: Clean Catch Clean Catch (Midstream)  Final Report (02-03-23 @ 19:05):    >100,000 CFU/ml Enterococcus faecium (vancomycin resistant)  Organism: Enterococcus faecium (vancomycin resistant) (02-03-23 @ 19:05)  Organism: Enterococcus faecium (vancomycin resistant) (02-03-23 @ 19:05)      -  Ampicillin: R >8 Predicts results to ampicillin/sulbactam, amoxacillin-clavulanate and  piperacillin-tazobactam.      -  Ciprofloxacin: R >2      -  Daptomycin: N/A >4      -  Levofloxacin: R >4      -  Linezolid: S 2      -  Nitrofurantoin: S <=32 Should not be used to treat pyelonephritis.      -  Tetracycline: R >8      -  Vancomycin: R >16      Method Type: JIM    Culture - Blood (collected 01-31-23 @ 10:20)  Source: .Blood Blood-Peripheral  Gram Stain (02-03-23 @ 01:17):    Growth in aerobic bottle: Gram positive cocci in pairs    Growth in anaerobic bottle: Gram positive cocci in pairs  Final Report (02-06-23 @ 20:20):    Growth in aerobic bottle: Enterococcus faecalis    Growth in aerobic and anaerobic bottles: Staphylococcus epidermidis    ***Blood Panel PCR results on this specimen are available    approximately 3 hours after the Gram stain result.***    Gram stain, PCR,and/or culture results may not always    correspond due to difference in methodologies.    ************************************************************    This PCR assay was performed by multiplex PCR. This    Assay tests for 66 bacterial and resistance genetargets.    Please refer to the [facility] test directory    at [email or web address] for details.  Organism: Blood Culture PCR  Enterococcus faecalis (02-06-23 @ 20:20)  Organism: Enterococcus faecalis (02-06-23 @ 20:20)      -  Ampicillin: S <=2 Predicts results to ampicillin/sulbactam, amoxacillin-clavulanate and  piperacillin-tazobactam.      -  Gentamicin synergy: S <=500      -  Streptomycin synergy: S <=1000      -  Vancomycin: S 2      Method Type: JIM  Organism: Blood Culture PCR (02-06-23 @ 20:20)      -  Enterococcus faecalis: Detec      -  Staphylococcus epidermidis, Methicillin resistant: Detec      Method Type: PCR    Culture - Blood (collected 01-30-23 @ 12:01)  Source: .Blood Blood-Peripheral  Gram Stain (02-01-23 @ 22:58):    Growth in aerobic bottle: Gram Positive Cocci in Clusters    Growth in anaerobic bottle: Gram Positive Cocci in Clusters  Final Report (02-02-23 @ 10:29):    Growth in aerobic and anaerobic bottles: Staphylococcus epidermidis    ***Blood Panel PCR results on this specimen are available    approximately 3 hours after the Gram stain result.***    Gram stain, PCR, and/or culture results may not always    correspond due to difference in methodologies.    ************************************************************    This PCR assay was performed by multiplex PCR. This    Assay tests for 66 bacterial and resistance gene targets.    Please refer to the [facility] test directory    at [email or web address] for details.  Organism: Blood Culture PCR  Staphylococcus epidermidis (02-02-23 @ 10:29)  Organism: Staphylococcus epidermidis (02-02-23 @ 10:29)      -  Ampicillin/Sulbactam: R 16/8      -  Cefazolin: R >16      -  Clindamycin: R >4      -  Erythromycin: R >4      -  Gentamicin: S <=1 Should not be used as monotherapy      -  Oxacillin: R >2      -  Penicillin: R >8      -  Rifampin: S <=1 Should not be used as monotherapy      -  Tetracycline: S <=1      -  Trimethoprim/Sulfamethoxazole: R >2/38      -  Vancomycin: S 1      Method Type: JIM  Organism: Blood Culture PCR (02-02-23 @ 10:29)      -  Staphylococcus epidermidis, Methicillin resistant: Detec      Method Type: PCR    Radiology: reviewed     Medications:  acetaminophen     Tablet .. 650 milliGRAM(s) Oral every 6 hours PRN  aluminum hydroxide/magnesium hydroxide/simethicone Suspension 30 milliLiter(s) Oral every 4 hours PRN  benzocaine/menthol Lozenge 1 Lozenge Oral every 8 hours PRN  budesonide 160 MICROgram(s)/formoterol 4.5 MICROgram(s) Inhaler 2 Puff(s) Inhalation two times a day  chlorhexidine 2% Cloths 1 Application(s) Topical daily  cholecalciferol 2000 Unit(s) Oral daily  citalopram 10 milliGRAM(s) Oral daily  fluticasone propionate 50 MICROgram(s)/spray Nasal Spray 1 Spray(s) Both Nostrils two times a day  folic acid 1 milliGRAM(s) Oral daily  guaiFENesin  milliGRAM(s) Oral every 12 hours  influenza  Vaccine (HIGH DOSE) 0.7 milliLiter(s) IntraMuscular once  ipratropium    for Nebulization 500 MICROGram(s) Nebulizer every 6 hours  lactobacillus acidophilus 1 Tablet(s) Oral daily  melatonin 3 milliGRAM(s) Oral at bedtime PRN  metoprolol tartrate 25 milliGRAM(s) Oral two times a day  mirtazapine 7.5 milliGRAM(s) Oral daily  nitrofurantoin monohydrate/macrocrystals (MACROBID) 100 milliGRAM(s) Oral two times a day  ondansetron Injectable 4 milliGRAM(s) IV Push every 8 hours PRN  pantoprazole    Tablet 40 milliGRAM(s) Oral before breakfast  polyethylene glycol 3350 17 Gram(s) Oral daily PRN  predniSONE   Tablet 20 milliGRAM(s) Oral daily  senna 1 Tablet(s) Oral daily  sodium bicarbonate 650 milliGRAM(s) Oral three times a day  sodium bicarbonate  Infusion 0.108 mEq/kG/Hr IV Continuous <Continuous>  sodium chloride 0.65% Nasal 2 Spray(s) Both Nostrils every 6 hours  trimethoprim  160 mG/sulfamethoxazole 800 mG 1 Tablet(s) Oral daily  vancomycin  IVPB 1000 milliGRAM(s) IV Intermittent every 24 hours    Current Antimicrobials:  nitrofurantoin monohydrate/macrocrystals (MACROBID) 100 milliGRAM(s) Oral two times a day  trimethoprim  160 mG/sulfamethoxazole 800 mG 1 Tablet(s) Oral daily  vancomycin  IVPB 1000 milliGRAM(s) IV Intermittent every 24 hours    Prior/Completed Antimicrobials:  cefepime   IVPB  vancomycin  IVPB

## 2023-02-07 NOTE — PROGRESS NOTE ADULT - ASSESSMENT
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 She presented here from [facility] for elevated WBC and low hemoglobin, severe weakness. Pt states for the past 2-3 days has been having increased weakness and lethargy, decreased appetite. +sputum productive cough. + cui, no sob, no difficulty breathing. No abdominal pain, nausea, vomiting, no bloody stools. Has endorsed multiple episodes of loose stools x weeks, currently being treated for c.diff with oral vancomycin. Nephrology consulted for Hyponatremia.       A/P     HYponatremia   likely 2/2 dehydration / hypotonic solution /hyperglycemia   got vanco in D5   avoid hypotonic solution   lasix on hold Feb 4  patient is not eating and drinking   clinically dehydrated  s/p sodium bicarb 75cc/hr x1L 02/6/23  Na corrected appropriately   Avoid overcorrection >6-8meq a day   monitor Na closely     Acidosis without anion gap   patient had diarrhea   s/p Sodium bicarb 75cc/hr x 1L 02/6/23  stable   monitor     HTN   stable   monitor BP closely     Anemia   heme/onc on board   PRBC as needed   monitor H/H      82 yr old female with a PMHx of diffuse large B cell lymphoma in remission, non-hodgkin's lymphoma (chemoport), depression, HLD, GERD, PE/DVT (4/2021 no longer on Eliquis), ANCA-vasculitis (20 y/a, no meds), s/p LVATS, LUIS wedge resection (2021), recent direct admit for RVATS, RUL Nodule Biopsy w/ IR marking in [facility], path favoring vasculitis, treated with Decadron, then switched to PO prednisone, went to [facility]. She presented here from [facility] for elevated WBC and low hemoglobin, severe weakness. Pt states for the past 2-3 days has been having increased weakness and lethargy, decreased appetite. +sputum productive cough. + cui, no sob, no difficulty breathing. No abdominal pain, nausea, vomiting, no bloody stools. Has endorsed multiple episodes of loose stools x weeks, currently being treated for c.diff with oral vancomycin. Nephrology consulted for Hyponatremia.       A/P     HYponatremia   likely 2/2 dehydration / hypotonic solution /hyperglycemia   got vanco in D5   avoid hypotonic solution   lasix on hold Feb 4  patient is not eating and drinking   clinically dehydrated  s/p sodium bicarb 75cc/hr x1L 02/6/23  Na corrected appropriately   Avoid overcorrection >6-8meq a day   monitor Na closely     Acidosis without anion gap   patient had diarrhea   s/p Sodium bicarb 75cc/hr x 1L 02/6/23  stable   monitor     HTN   stable   monitor BP closely     Anemia   heme/onc on board   PRBC as needed   monitor H/H     hypophosphatemia   suggest to give po4 supplement   monitor    82 yr old female with a PMHx of diffuse large B cell lymphoma in remission, non-hodgkin's lymphoma (chemoport), depression, HLD, GERD, PE/DVT (4/2021 no longer on Eliquis), ANCA-vasculitis (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 She presented here from [facility] for elevated WBC and low hemoglobin, severe weakness. Pt states for the past 2-3 days has been having increased weakness and lethargy, decreased appetite. +sputum productive cough. + cui, no sob, no difficulty breathing. No abdominal pain, nausea, vomiting, no bloody stools. Has endorsed multiple episodes of loose stools x weeks, currently being treated for c.diff with oral vancomycin. Nephrology consulted for Hyponatremia.       A/P     HYponatremia   likely 2/2 dehydration / hypotonic solution /hyperglycemia   corrected Na 132 02/6/23   got vanco in D5   avoid hypotonic solution   lasix on hold Feb 4  patient is not eating and drinking   clinically dehydrated  s/p sodium bicarb 75cc/hr x1L 02/6/23  Na corrected appropriately today   Avoid overcorrection >6-8meq a day   monitor Na closely     Acidosis without anion gap   patient had diarrhea   s/p Sodium bicarb 75cc/hr x 1L 02/6/23  stable   monitor     HTN   stable   monitor BP closely     Anemia   heme/onc on board   PRBC as needed   monitor H/H     hypophosphatemia   suggest to give po4 supplement   monitor

## 2023-02-07 NOTE — PROGRESS NOTE ADULT - ASSESSMENT
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 She presented here from [facility] for elevated WBC and low hemoglobin, severe weakness. Pt states for the past 2-3 days has been having increased weakness and lethargy, decreased appetite. +sputum productive cough. + cui, no sob, no difficulty breathing. No abdominal pain, nausea, vomiting, no bloody stools. Has endorsed multiple episodes of loose stools x weeks, currently being treated for c.diff with oral vancomycin.     Fatigue/dyspnea: due to severe anemia  - 2' MDS  - transfuse to keep Hgb above 7.0  - no melena, no hematochezia. no BRBPR. occult stool neg.   - she tends to require IV Lasix intermittently post transfusion.  - doubt GI bleed   - Physical therapy. Out of bed to chair with assistance.     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     Diarrhea, unspecified  - elevated WBC  - no documented c.diff PCR, re-ordered.  - s/p Vanco PO a few days (started in rehab)  - diarrhea completely resolved.   - monitor off PO vanco per ID  - now constipated. PRN bowel regimen started. +BM    Fever,  resolved.   - RVP 1/30/23 (-)  - monitor blood cxs 1/30/23  - started empirically on cefepime 1/30/23, switched to merrem 1/31/23  - enterrocus bacteremia, abx per ID. on IV Vanco.  - Macrobid x 5 days course added for VRE in urine  - blood cultures now negative     AMS  - unclear etiology, likely metabolic encephalopathy from ?Cefepime? received 3 doses, last dose 1/31/23  - CT head neg. sugars stable  - vanco IV added per ID.   - close monitoring   - monitor glucose (glucose 65 on BMP, but 95 on fingersticks)  - encourage PO intake   - mental status improving.     Pulmonary vasculitis   - Recent TTE on 11/3/22 reviewed: normal LV. outpt card Dr. Ady Cooper  - outpt pulm Mack Tucker   - s/p thoracoscopic biopsy of mediastinal lymph node and Lung wedge resection 11/10/22  - recent pleural effusion s/p 650 cc U/S-guided rt thoracentesis 11/17/22  - exudative but no neutrophilic predominance and initial Gram stain w/o organisms  - cultures neg.   - path results favoring vasculitis: no evidence for lymphoma. Cytology also came back negative.   - comfortable on nasal canula, better post diuretic last admission.   - rheum and heme-onc following previously, will reconsult.   - last admission, she was not started on immunosuppressants such as cellcept given recent treatment for COVID19 at that time  - c/w prednisone 20 mg qdaily.   - RTX under consideration --> acute hepatitis panel (-) and quantiferon indeterminate  - ID started PCP ppx with Bactrim.     hx of Tachycardia    - cont low-dose metoprolol, 50 mg to 25 mg dose reduced in rehab.   - card consult (Dr. Hooker) in the past, if needed    Anxiety and depression  - stable, continue citalopram and Remeron.  - Pt denied SI/HI ideations, denied visual and auditory hallucinations.     GERD (gastroesophageal reflux disease)  - continue PPI    Hyperlipidemia.   - continue home ezetimibe. okay to hold if nonformulary     Hyponatremia 127 (hypovolemic?)  - renal on the case, resolved.   - s/p 3 days of IV lasix, now completed. electrolytes supplemented.  - O2 weaned off from ventimask to nascal canula to room air.     DVT ppx   - holding AC in the setting of anemia, pancytopenia.   - venodyne boots LEs

## 2023-02-08 NOTE — PROGRESS NOTE ADULT - SUBJECTIVE AND OBJECTIVE BOX
[facility]   MD RONEL FRIAS MD, PA KRISTINE SOLTANPOUR, DO INJUNG KO, NP    TEL:  OFFICE: [phone]    From 5pm-7am Answering Service [phone]    -- RENAL FOLLOW UP NOTE ---Date of Service 02-08-23 @ 13:20    Patient is a 82y old  Female who presents with a chief complaint of weakness (08 Feb 2023 12:11)      Patient seen and examined at bedside. No chest pain/sob    VITALS:  T(F): 98 (02-08-23 @ 04:54), Max: 98 (02-07-23 @ 20:21)  HR: 100 (02-08-23 @ 04:54)  BP: 118/70 (02-08-23 @ 04:54)  RR: 18 (02-08-23 @ 04:54)  SpO2: 95% (02-08-23 @ 04:54)  Wt(kg): --    02-07 @ 07:01  -  02-08 @ 07:00  --------------------------------------------------------  IN: 0 mL / OUT: 400 mL / NET: -400 mL          PHYSICAL EXAM:  Constitutional: NAD  Neck: No JVD  Respiratory: CTAB, no wheezes, rales or rhonchi  Cardiovascular: S1, S2, RRR  Gastrointestinal: BS+, soft, NT/ND  Extremities: No peripheral edema    Hospital Medications:   MEDICATIONS  (STANDING):  budesonide 160 MICROgram(s)/formoterol 4.5 MICROgram(s) Inhaler 2 Puff(s) Inhalation two times a day  chlorhexidine 2% Cloths 1 Application(s) Topical daily  cholecalciferol 2000 Unit(s) Oral daily  citalopram 10 milliGRAM(s) Oral daily  fluticasone propionate 50 MICROgram(s)/spray Nasal Spray 1 Spray(s) Both Nostrils two times a day  folic acid 1 milliGRAM(s) Oral daily  guaiFENesin  milliGRAM(s) Oral every 12 hours  influenza  Vaccine (HIGH DOSE) 0.7 milliLiter(s) IntraMuscular once  ipratropium    for Nebulization 500 MICROGram(s) Nebulizer every 6 hours  lactobacillus acidophilus 1 Tablet(s) Oral daily  metoprolol tartrate 25 milliGRAM(s) Oral two times a day  mirtazapine 7.5 milliGRAM(s) Oral daily  nitrofurantoin monohydrate/macrocrystals (MACROBID) 100 milliGRAM(s) Oral two times a day  pantoprazole    Tablet 40 milliGRAM(s) Oral before breakfast  predniSONE   Tablet 20 milliGRAM(s) Oral daily  senna 1 Tablet(s) Oral daily  sodium chloride 0.65% Nasal 2 Spray(s) Both Nostrils every 6 hours  trimethoprim  160 mG/sulfamethoxazole 800 mG 1 Tablet(s) Oral daily  vancomycin  IVPB 1000 milliGRAM(s) IV Intermittent every 24 hours      LABS:  02-08    138  |  102  |  14  ----------------------------<  77  3.3<L>   |  25  |  0.64    Ca    9.0      08 Feb 2023 06:50  Phos  2.5     02-08  Mg     1.7     02-08    TPro  6.0  /  Alb  3.4  /  TBili  0.7  /  DBili      /  AST  19  /  ALT  7<L>  /  AlkPhos  63  02-07    Creatinine Trend: 0.64 <--, 0.61 <--, 0.60 <--, 0.59 <--, 0.68 <--, 0.86 <--, 0.87 <--    Phosphorus Level, Serum: 2.5 mg/dL (02-08 @ 06:50)                              9.4    45.24 )-----------( 14       ( 08 Feb 2023 06:54 )             28.9     Urine Studies:  Urinalysis - [02-01-23 @ 09:57]      Color Yellow / Appearance Slightly Turbid / SG 1.033 / pH 7.0      Gluc Negative / Ketone Trace  / Bili Negative / Urobili Negative       Blood Small / Protein 300 mg/dL / Leuk Est Negative / Nitrite Negative      RBC 4 /  / Hyaline 4 / Gran  / Sq Epi  / Non Sq Epi 10 / Bacteria Moderate    Urine Sodium 169      [02-07-23 @ 07:13]  Urine Osmolality 814      [02-07-23 @ 07:13]    Iron 152, TIBC 180, %sat 84      [01-19-23 @ 11:19]  Ferritin 5768      [01-19-23 @ 11:19]  Vitamin D (25OH) 28.1      [01-19-23 @ 11:19]  Lipid: chol 84, TG 81, HDL 22, LDL --      [10-01-22 @ 07:10]    HBsAg Nonreact      [01-25-23 @ 07:18]  HBcAb Nonreact      [01-25-23 @ 07:18]  HCV 0.15, Nonreact      [01-25-23 @ 07:18]    MPO-ANCA <5.0, interpretation: Negative      [02-02-23 @ 07:22]  PR3-ANCA <5.0, interpretation: Negative      [02-02-23 @ 07:22]  Free Light Chains: kappa 4.65, lambda 2.60, ratio = 1.79      [01-25 @ 07:18]    RADIOLOGY & ADDITIONAL STUDIES:

## 2023-02-08 NOTE — PROGRESS NOTE ADULT - SUBJECTIVE AND OBJECTIVE BOX
[facility] DIVISION OF INFECTIOUS DISEASES  ANDREW Rodríguez Y. Patel, S. Shah, G. Casimir  [phone]  ([phone] - weekdays after 5pm and weekends)    Name: BETTIE NGUYEN  Age/Gender: 82y Female  MRN: [id number]    Interval History:  Patient seen and examined this morning.   Resting comfortably, no new complaints noted.  Notes reviewed. Afebrile     Allergies: codeine (Nausea)  doxycycline (Nausea)  penicillin (Hives)    Objective:  Vitals:   T(F): 98 (02-08-23 @ 04:54), Max: 98.1 (02-07-23 @ 11:27)  HR: 100 (02-08-23 @ 04:54) (78 - 100)  BP: 118/70 (02-08-23 @ 04:54) (109/63 - 170/75)  RR: 18 (02-08-23 @ 04:54) (17 - 18)  SpO2: 95% (02-08-23 @ 04:54) (94% - 99%)  Physical Examination:  General: no acute distress  HEENT: NC/AT, anicteric, neck supple  Respiratory: no acc muscle use, breathing comfortably  Cardiovascular: S1 and S2 present  Gastrointestinal: normal appearing, nondistended  Extremities: no edema, no cyanosis  Skin: no visible rash, ecchymosis   Lines: L chest port without erythema/TTP    Laboratory Studies:  CBC:                       9.4    45.24 )-----------( 14       ( 08 Feb 2023 06:54 )             28.9     WBC Trend:  45.24 02-08-23 @ 06:54  31.08 02-07-23 @ 07:01  28.03 02-06-23 @ 06:58  28.78 02-05-23 @ 07:33  33.58 02-04-23 @ 07:03  49.00 02-02-23 @ 16:52  55.63 02-02-23 @ 02:35  47.65 02-01-23 @ 18:31    CMP: 02-08    138  |  102  |  14  ----------------------------<  77  3.3<L>   |  25  |  0.64    Ca    9.0      08 Feb 2023 06:50  Phos  2.5     02-08  Mg     1.7     02-08    TPro  6.0  /  Alb  3.4  /  TBili  0.7  /  DBili  x   /  AST  19  /  ALT  7<L>  /  AlkPhos  63  02-07    Creatinine, Serum: 0.64 mg/dL (02-08-23 @ 06:50)  Creatinine, Serum: 0.61 mg/dL (02-07-23 @ 07:01)  Creatinine, Serum: 0.60 mg/dL (02-06-23 @ 12:38)  Creatinine, Serum: 0.59 mg/dL (02-06-23 @ 06:55)  Creatinine, Serum: 0.68 mg/dL (02-05-23 @ 07:30)  Creatinine, Serum: 0.86 mg/dL (02-04-23 @ 07:03)  Creatinine, Serum: 0.87 mg/dL (02-02-23 @ 16:52)    LIVER FUNCTIONS - ( 07 Feb 2023 07:01 )  Alb: 3.4 g/dL / Pro: 6.0 g/dL / ALK PHOS: 63 U/L / ALT: 7 U/L / AST: 19 U/L / GGT: x           Vancomycin Level, Random: 17.9 ug/mL (02-07-23 @ 07:01)  Vancomycin Level, Trough: 17.4 ug/mL (02-06-23 @ 09:40)    Microbiology: reviewed   Culture - Urine (collected 02-01-23 @ 09:57)  Source: Clean Catch Clean Catch (Midstream)  Final Report (02-04-23 @ 07:54):    >100,000 CFU/ml Enterococcus faecium (vancomycin resistant)  Organism: Enterococcus faecium (vancomycin resistant) (02-04-23 @ 07:54)  Organism: Enterococcus faecium (vancomycin resistant) (02-04-23 @ 07:54)      -  Ampicillin: R >8 Predicts results to ampicillin/sulbactam, amoxacillin-clavulanate and  piperacillin-tazobactam.      -  Ciprofloxacin: R >2      -  Daptomycin: N/A >4      -  Levofloxacin: R >4      -  Linezolid: S 2      -  Nitrofurantoin: S <=32 Should not be used to treat pyelonephritis.      -  Tetracycline: R >8      -  Vancomycin: R >16      Method Type: JIM    Culture - Blood (collected 02-01-23 @ 09:35)  Source: .Blood Blood-Peripheral  Final Report (02-06-23 @ 13:01):    No Growth Final    Culture - Blood (collected 02-01-23 @ 09:20)  Source: .Blood Blood-Peripheral  Final Report (02-06-23 @ 13:01):    No Growth Final    Culture - Urine (collected 02-01-23 @ 02:04)  Source: Clean Catch Clean Catch (Midstream)  Final Report (02-03-23 @ 19:05):    >100,000 CFU/ml Enterococcus faecium (vancomycin resistant)  Organism: Enterococcus faecium (vancomycin resistant) (02-03-23 @ 19:05)  Organism: Enterococcus faecium (vancomycin resistant) (02-03-23 @ 19:05)      -  Ampicillin: R >8 Predicts results to ampicillin/sulbactam, amoxacillin-clavulanate and  piperacillin-tazobactam.      -  Ciprofloxacin: R >2      -  Daptomycin: N/A >4      -  Levofloxacin: R >4      -  Linezolid: S 2      -  Nitrofurantoin: S <=32 Should not be used to treat pyelonephritis.      -  Tetracycline: R >8      -  Vancomycin: R >16      Method Type: JIM    Culture - Blood (collected 01-31-23 @ 10:20)  Source: .Blood Blood-Peripheral  Gram Stain (02-03-23 @ 01:17):    Growth in aerobic bottle: Gram positive cocci in pairs    Growth in anaerobic bottle: Gram positive cocci in pairs  Final Report (02-06-23 @ 20:20):    Growth in aerobic bottle: Enterococcus faecalis    Growth in aerobic and anaerobic bottles: Staphylococcus epidermidis    ***Blood Panel PCR results on this specimen are available    approximately 3 hours after the Gram stain result.***    Gram stain, PCR,and/or culture results may not always    correspond due to difference in methodologies.    ************************************************************    This PCR assay was performed by multiplex PCR. This    Assay tests for 66 bacterial and resistance genetargets.    Please refer to the [facility] test directory    at [email or web address] for details.  Organism: Blood Culture PCR  Enterococcus faecalis (02-06-23 @ 20:20)  Organism: Enterococcus faecalis (02-06-23 @ 20:20)      -  Ampicillin: S <=2 Predicts results to ampicillin/sulbactam, amoxacillin-clavulanate and  piperacillin-tazobactam.      -  Gentamicin synergy: S <=500      -  Streptomycin synergy: S <=1000      -  Vancomycin: S 2      Method Type: JIM  Organism: Blood Culture PCR (02-06-23 @ 20:20)      -  Enterococcus faecalis: Detec      -  Staphylococcus epidermidis, Methicillin resistant: Detec      Method Type: PCR    Culture - Blood (collected 01-30-23 @ 12:01)  Source: .Blood Blood-Peripheral  Gram Stain (02-01-23 @ 22:58):    Growth in aerobic bottle: Gram Positive Cocci in Clusters    Growth in anaerobic bottle: Gram Positive Cocci in Clusters  Final Report (02-02-23 @ 10:29):    Growth in aerobic and anaerobic bottles: Staphylococcus epidermidis    ***Blood Panel PCR results on this specimen are available    approximately 3 hours after the Gram stain result.***    Gram stain, PCR, and/or culture results may not always    correspond due to difference in methodologies.    ************************************************************    This PCR assay was performed by multiplex PCR. This    Assay tests for 66 bacterial and resistance gene targets.    Please refer to the [facility] test directory    at [email or web address] for details.  Organism: Blood Culture PCR  Staphylococcus epidermidis (02-02-23 @ 10:29)  Organism: Staphylococcus epidermidis (02-02-23 @ 10:29)      -  Ampicillin/Sulbactam: R 16/8      -  Cefazolin: R >16      -  Clindamycin: R >4      -  Erythromycin: R >4      -  Gentamicin: S <=1 Should not be used as monotherapy      -  Oxacillin: R >2      -  Penicillin: R >8      -  Rifampin: S <=1 Should not be used as monotherapy      -  Tetracycline: S <=1      -  Trimethoprim/Sulfamethoxazole: R >2/38      -  Vancomycin: S 1      Method Type: JIM  Organism: Blood Culture PCR (02-02-23 @ 10:29)      -  Staphylococcus epidermidis, Methicillin resistant: Detec      Method Type: PCR    Radiology: reviewed     Medications:  acetaminophen     Tablet .. 650 milliGRAM(s) Oral every 6 hours PRN  aluminum hydroxide/magnesium hydroxide/simethicone Suspension 30 milliLiter(s) Oral every 4 hours PRN  benzocaine/menthol Lozenge 1 Lozenge Oral every 8 hours PRN  budesonide 160 MICROgram(s)/formoterol 4.5 MICROgram(s) Inhaler 2 Puff(s) Inhalation two times a day  chlorhexidine 2% Cloths 1 Application(s) Topical daily  cholecalciferol 2000 Unit(s) Oral daily  citalopram 10 milliGRAM(s) Oral daily  fluticasone propionate 50 MICROgram(s)/spray Nasal Spray 1 Spray(s) Both Nostrils two times a day  folic acid 1 milliGRAM(s) Oral daily  guaiFENesin  milliGRAM(s) Oral every 12 hours  influenza  Vaccine (HIGH DOSE) 0.7 milliLiter(s) IntraMuscular once  ipratropium    for Nebulization 500 MICROGram(s) Nebulizer every 6 hours  lactobacillus acidophilus 1 Tablet(s) Oral daily  melatonin 3 milliGRAM(s) Oral at bedtime PRN  metoprolol tartrate 25 milliGRAM(s) Oral two times a day  mirtazapine 7.5 milliGRAM(s) Oral daily  nitrofurantoin monohydrate/macrocrystals (MACROBID) 100 milliGRAM(s) Oral two times a day  ondansetron Injectable 4 milliGRAM(s) IV Push every 8 hours PRN  pantoprazole    Tablet 40 milliGRAM(s) Oral before breakfast  polyethylene glycol 3350 17 Gram(s) Oral daily PRN  potassium chloride  10 mEq/100 mL IVPB 10 milliEquivalent(s) IV Intermittent every 1 hour  predniSONE   Tablet 20 milliGRAM(s) Oral daily  senna 1 Tablet(s) Oral daily  sodium chloride 0.65% Nasal 2 Spray(s) Both Nostrils every 6 hours  trimethoprim  160 mG/sulfamethoxazole 800 mG 1 Tablet(s) Oral daily  vancomycin  IVPB 1000 milliGRAM(s) IV Intermittent every 24 hours    Current Antimicrobials:  nitrofurantoin monohydrate/macrocrystals (MACROBID) 100 milliGRAM(s) Oral two times a day  trimethoprim  160 mG/sulfamethoxazole 800 mG 1 Tablet(s) Oral daily  vancomycin  IVPB 1000 milliGRAM(s) IV Intermittent every 24 hours    Prior/Completed Antimicrobials:  cefepime   IVPB  vancomycin  IVPB

## 2023-02-08 NOTE — PROGRESS NOTE ADULT - ASSESSMENT
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 She presented here from [facility] for elevated WBC and low hemoglobin, severe weakness. Pt states for the past 2-3 days has been having increased weakness and lethargy, decreased appetite. +sputum productive cough. + cui, no sob, no difficulty breathing. No abdominal pain, nausea, vomiting, no bloody stools. Has endorsed multiple episodes of loose stools x weeks, currently being treated for c.diff with oral vancomycin.     Fatigue/dyspnea: due to severe anemia  - 2' MDS  - transfuse to keep Hgb above 7.0  - no melena, no hematochezia. no BRBPR. occult stool neg.   - she tends to require IV Lasix intermittently post transfusion.  - doubt GI bleed   - Physical therapy. Out of bed to chair with assistance.     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     Diarrhea, unspecified  - elevated WBC  - no documented c.diff PCR, re-ordered.  - s/p Vanco PO a few days (started in rehab)  - diarrhea completely resolved.   - monitor off PO vanco per ID  - now constipated. PRN bowel regimen started. +BM    Fever,  resolved.   - RVP 1/30/23 (-)  - monitor blood cxs 1/30/23  - started empirically on cefepime 1/30/23, switched to merrem 1/31/23 --> 2/3/23  - enterrocus bacteremia, abx per ID. on IV Vanco, last day 2/10/23  - Macrobid x 5 days course added for VRE in urine, last day was 2/8/23  - blood cultures now negative     AMS  - unclear etiology, likely metabolic encephalopathy from ?Cefepime? received 3 doses, last dose 1/31/23  - CT head neg. sugars stable  - vanco IV added per ID.   - close monitoring   - monitor glucose (glucose 65 on BMP, but 95 on fingersticks)  - encourage PO intake   - mental status improving.     Pulmonary vasculitis   - Recent TTE on 11/3/22 reviewed: normal LV. outpt card Dr. Ady Cooper  - outpt pulm Mack Tucker   - s/p thoracoscopic biopsy of mediastinal lymph node and Lung wedge resection 11/10/22  - recent pleural effusion s/p 650 cc U/S-guided rt thoracentesis 11/17/22  - exudative but no neutrophilic predominance and initial Gram stain w/o organisms  - cultures neg.   - path results favoring vasculitis: no evidence for lymphoma. Cytology also came back negative.   - comfortable on nasal canula, better post diuretic last admission.   - rheum and heme-onc following previously, will reconsult.   - last admission, she was not started on immunosuppressants such as cellcept given recent treatment for COVID19 at that time  - c/w prednisone 20 mg qdaily.   - RTX under consideration --> acute hepatitis panel (-) and quantiferon indeterminate  - ID started PCP ppx with Bactrim.     hx of Tachycardia    - cont low-dose metoprolol, 50 mg to 25 mg dose reduced in rehab.   - card consult (Dr. Hooker) in the past, if needed    Anxiety and depression  - stable, continue citalopram and Remeron.  - Pt denied SI/HI ideations, denied visual and auditory hallucinations.     GERD (gastroesophageal reflux disease)  - continue PPI    Hyperlipidemia.   - continue home ezetimibe. okay to hold if nonformulary     Hyponatremia 127 (hypovolemic?)  - renal on the case, resolved.   - s/p 3 days of IV lasix, now completed. electrolytes supplemented.  - O2 weaned off from ventimask to nascal canula to room air.     DVT ppx   - holding AC in the setting of anemia, pancytopenia.   - venodyne boots LEs

## 2023-02-08 NOTE — PROGRESS NOTE ADULT - SUBJECTIVE AND OBJECTIVE BOX
Date of Service: 02-08-23 @ 12:12    Patient is a 82y old  Female who presents with a chief complaint of weakness (08 Feb 2023 10:46)      Any change in ROS: seems  OK:  no sob:  on room air:     MEDICATIONS  (STANDING):  budesonide 160 MICROgram(s)/formoterol 4.5 MICROgram(s) Inhaler 2 Puff(s) Inhalation two times a day  chlorhexidine 2% Cloths 1 Application(s) Topical daily  cholecalciferol 2000 Unit(s) Oral daily  citalopram 10 milliGRAM(s) Oral daily  fluticasone propionate 50 MICROgram(s)/spray Nasal Spray 1 Spray(s) Both Nostrils two times a day  folic acid 1 milliGRAM(s) Oral daily  guaiFENesin  milliGRAM(s) Oral every 12 hours  influenza  Vaccine (HIGH DOSE) 0.7 milliLiter(s) IntraMuscular once  ipratropium    for Nebulization 500 MICROGram(s) Nebulizer every 6 hours  lactobacillus acidophilus 1 Tablet(s) Oral daily  metoprolol tartrate 25 milliGRAM(s) Oral two times a day  mirtazapine 7.5 milliGRAM(s) Oral daily  nitrofurantoin monohydrate/macrocrystals (MACROBID) 100 milliGRAM(s) Oral two times a day  pantoprazole    Tablet 40 milliGRAM(s) Oral before breakfast  predniSONE   Tablet 20 milliGRAM(s) Oral daily  senna 1 Tablet(s) Oral daily  sodium chloride 0.65% Nasal 2 Spray(s) Both Nostrils every 6 hours  trimethoprim  160 mG/sulfamethoxazole 800 mG 1 Tablet(s) Oral daily  vancomycin  IVPB 1000 milliGRAM(s) IV Intermittent every 24 hours    MEDICATIONS  (PRN):  acetaminophen     Tablet .. 650 milliGRAM(s) Oral every 6 hours PRN Temp greater or equal to 38C (100.4F), Mild Pain (1 - 3)  aluminum hydroxide/magnesium hydroxide/simethicone Suspension 30 milliLiter(s) Oral every 4 hours PRN Dyspepsia  benzocaine/menthol Lozenge 1 Lozenge Oral every 8 hours PRN Sore Throat  melatonin 3 milliGRAM(s) Oral at bedtime PRN Insomnia  ondansetron Injectable 4 milliGRAM(s) IV Push every 8 hours PRN Nausea and/or Vomiting  polyethylene glycol 3350 17 Gram(s) Oral daily PRN Constipation    Vital Signs Last 24 Hrs  T(C): 36.7 (08 Feb 2023 04:54), Max: 36.7 (07 Feb 2023 20:21)  T(F): 98 (08 Feb 2023 04:54), Max: 98 (07 Feb 2023 20:21)  HR: 100 (08 Feb 2023 04:54) (78 - 100)  BP: 118/70 (08 Feb 2023 04:54) (118/70 - 170/75)  BP(mean): --  RR: 18 (08 Feb 2023 04:54) (18 - 18)  SpO2: 95% (08 Feb 2023 04:54) (95% - 99%)    Parameters below as of 08 Feb 2023 04:54  Patient On (Oxygen Delivery Method): room air        I&O's Summary    07 Feb 2023 07:01  -  08 Feb 2023 07:00  --------------------------------------------------------  IN: 0 mL / OUT: 400 mL / NET: -400 mL          Physical Exam:   GENERAL: NAD, well-groomed, well-developed  HEENT: RANJIT/   Atraumatic, Normocephalic  ENMT: No tonsillar erythema, exudates, or enlargement; Moist mucous membranes, Good dentition, No lesions  NECK: Supple, No JVD, Normal thyroid  CHEST/LUNG: scattered crackles   CVS: Regular rate and rhythm; No murmurs, rubs, or gallops  GI: : Soft, Nontender, Nondistended; Bowel sounds present  NERVOUS SYSTEM:  Alert & Oriented X3  EXTREMITIES:  - edema  LYMPH: No lymphadenopathy noted  SKIN: No rashes or lesions  ENDOCRINOLOGY: No Thyromegaly  PSYCH: Appropriate    Labs:                              9.4    45.24 )-----------( 14       ( 08 Feb 2023 06:54 )             28.9                         8.9    31.08 )-----------( 32       ( 07 Feb 2023 07:01 )             26.4                         10.4   28.03 )-----------( 13       ( 06 Feb 2023 06:58 )             31.2                         10.1   28.78 )-----------( 26       ( 05 Feb 2023 07:33 )             29.6     02-08    138  |  102  |  14  ----------------------------<  77  3.3<L>   |  25  |  0.64  02-07    137  |  100  |  17  ----------------------------<  67<L>  3.2<L>   |  27  |  0.61  02-06    127<L>  |  95<L>  |  22  ----------------------------<  401<H>  4.5   |  18<L>  |  0.60  02-06    139  |  104  |  20  ----------------------------<  74  3.7   |  22  |  0.59  02-05    141  |  103  |  32<H>  ----------------------------<  72  3.1<L>   |  26  |  0.68    Ca    9.0      08 Feb 2023 06:50  Ca    8.6      07 Feb 2023 07:01  Ca    8.4      06 Feb 2023 12:38  Phos  2.5     02-08  Phos  2.3     02-07  Mg     1.7     02-08  Mg     1.6     02-07    TPro  6.0  /  Alb  3.4  /  TBili  0.7  /  DBili  x   /  AST  19  /  ALT  7<L>  /  AlkPhos  63  02-07  TPro  6.1  /  Alb  3.4  /  TBili  0.7  /  DBili  0.2  /  AST  24  /  ALT  8<L>  /  AlkPhos  69  02-06    CAPILLARY BLOOD GLUCOSE          LIVER FUNCTIONS - ( 07 Feb 2023 07:01 )  Alb: 3.4 g/dL / Pro: 6.0 g/dL / ALK PHOS: 63 U/L / ALT: 7 U/L / AST: 19 U/L / GGT: x               rad< from: Xray Chest 1 View- PORTABLE-Urgent (Xray Chest 1 View- PORTABLE-Urgent .) (02.01.23 @ 11:14) >    PROCEDURE DATE:  02/01/2023          INTERPRETATION:  EXAMINATION: XR CHEST URGENT    CLINICAL INDICATION: r/o aspiration, confusion    TECHNIQUE: Single frontal,portable view of the chest was obtained.    COMPARISON: Chest x-ray 1/30/2023    FINDINGS:    LINES/TUBES: Left chest wall medication port with tip in the right atrium.    LUNGS/PLEURA: Increase in bilateral lower lung hazy opacity. Question   trace right effusion. No left pleural effusion. No pneumothorax.    HEART AND MEDIASTINUM: The heart size is not accurately measured in this   projection.    SKELETON: No acute osseous abnormalities.    IMPRESSION:    Bibasilar hazy opacification. This may represent atelectasis or pneumonia    --- End of Report ---          JAMIE DURAN MD; Resident Radiologist  This document has been electronically signed.  LOAN TODD MD; Attending Radiologist  This document has been electronically signed. Feb 1 2023  4:03PM    < end of copied text >        RECENT CULTURES:        RESPIRATORY CULTURES:          Studies  Chest X-RAY  CT SCAN Chest   Venous Dopplers: LE:   CT Abdomen  Others

## 2023-02-08 NOTE — PROGRESS NOTE ADULT - ASSESSMENT
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 She presented here from [facility] for elevated WBC and low hemoglobin, severe weakness. Pt states for the past 2-3 days has been having increased weakness and lethargy, decreased appetite. Reports chronic cough, currently nonproductive - RVP/COVID negative. Has multiple episodes of loose stools x weeks, reported recently trying marijuana for appetite and noted worsening. She was being treated empirically for c.diff with oral vancomycin but then became constipated, s/p bowel regimen and having normal bowel movements. Patient initially being monitored off antibiotics given she was afebrile, WBC was stable and no infectious source was found. She had a fever 100.9F on 1/30 overnight with worsening leukocytosis and then 101.4F overnight 1/31 and noted with confusion.     Sepsis due to gram positive bacteremia    E. faecalis bacteremia - unclear source, ?GI, less likely    Staph epi bacteremia ?skin vs port source vs contamination  AMS - neurotoxicity d/t cefepime vs infectious etiology   --d/c cefepime 1/31, mental status improved   H/o PCN allergy with hives  - sepsis resolved - afebrile, WBC trend noted, Bcx cleared   - L chest port without sign of infection  - 2/1 CXR with bibasilar hazy opacification - may be atelectasis or pneumonia   - 1/30 Bcx (1 set sent) with Staph epi - MRSE -- sensitivities noted   - 1/31 Bcx (1 set sent) - aerobic bottle grew E. faecalis (amp and vanc sensitive) and MRSE  - 2/1 repeat Bcx -- Negative x2   - TTE w/o mention of vegetations  - continue vancomycin 1g IV Q24h -- complete total 10d course from negative Bcx - end 2/10   -- monitor vancomycin trough, goal trough 15-20 or -600  - monitor temps/CBC    UTI with E. faecium-VRE  - Ucx grew VRE-E. faecium, sensitivities reviewed   - continue on macrobid x 5 days - end today 2/8    Fatigue/dyspnea likely due to severe anemia due to MDS s/p transfusions  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  H/o DLBCL, EBV+   - noted patient had similar presentation with bicytopenia and leukocytosis in the past, may need tx with rituximab   - s/p BMBx 1/23 - found with myelodysplastic syndrome    - quantiferon indeterminate - pt on steroids which can cause indeterminate results    - CT - s/p wedge resections in b/l upper lobes, 2 cm nodular opacity a/w staple line in RUL; multiple ill-defined b/l opacities more in RLL -unclear etiology, b/l effusions R>L   - continue on Bactrim DS 1 tablet daily for PCP ppx while on prednisone   - Rheum and Heme/Onc following - planning for rituximab/chemotherapy     - pt improved, afebrile, completing UTI treatment today, and has cleared bacteremia 2/1 -- no absolute contraindication ID standpoint at this time     L epistaxis   - ENT following, packing removed      D/w Dr. Kurt Malave M.D.  [facility], Division of Infectious Diseases  [phone]  After 5pm on weekdays and all day on weekends - please call [phone]

## 2023-02-08 NOTE — PROGRESS NOTE ADULT - SUBJECTIVE AND OBJECTIVE BOX
[facility]      Seen earlier today Date of service T(F): 98.5 (23 @ 20:45), Max: 98.5 (23 @ 20:45)  HR: 87 (-23 @ 20:45) (87 - 100)  BP: 119/74 (-23 @ 20:45) (118/70 - 119/74)  RR: 19 (-23 @ 20:45) (18 - 19)  SpO2: 95% (-23 @ 20:45) (95% - 95%)  Wt(kg): -- No new neurological symptoms reported. Remains stable neurologically.   - Today, patient is without complaints.    T(F): 98.5 (-08-23 @ 20:45), Max: 98.5 (-08-23 @ 20:45)  HR: 87 (-08-23 @ 20:45) (87 - 100)  BP: 119/74 (-23 @ 20:45) (118/70 - 119/74)  RR: 19 (--23 @ 20:45) (18 - 19)  SpO2: 95% (-08-23 @ 20:45) (95% - 95%)  Wt(kg): --       *****MEDICATIONS: Current medication reviewed and documented.    MEDICATIONS  (STANDING):  budesonide 160 MICROgram(s)/formoterol 4.5 MICROgram(s) Inhaler 2 Puff(s) Inhalation two times a day  chlorhexidine 2% Cloths 1 Application(s) Topical daily  cholecalciferol 2000 Unit(s) Oral daily  citalopram 10 milliGRAM(s) Oral daily  fluticasone propionate 50 MICROgram(s)/spray Nasal Spray 1 Spray(s) Both Nostrils two times a day  folic acid 1 milliGRAM(s) Oral daily  guaiFENesin  milliGRAM(s) Oral every 12 hours  influenza  Vaccine (HIGH DOSE) 0.7 milliLiter(s) IntraMuscular once  ipratropium    for Nebulization 500 MICROGram(s) Nebulizer every 6 hours  lactobacillus acidophilus 1 Tablet(s) Oral daily  metoprolol tartrate 25 milliGRAM(s) Oral two times a day  mirtazapine 7.5 milliGRAM(s) Oral daily  nitrofurantoin monohydrate/macrocrystals (MACROBID) 100 milliGRAM(s) Oral two times a day  pantoprazole    Tablet 40 milliGRAM(s) Oral before breakfast  predniSONE   Tablet 20 milliGRAM(s) Oral daily  senna 1 Tablet(s) Oral daily  sodium chloride 0.65% Nasal 2 Spray(s) Both Nostrils every 6 hours  trimethoprim  160 mG/sulfamethoxazole 800 mG 1 Tablet(s) Oral daily  vancomycin  IVPB 1000 milliGRAM(s) IV Intermittent every 24 hours    MEDICATIONS  (PRN):  acetaminophen     Tablet .. 650 milliGRAM(s) Oral every 6 hours PRN Temp greater or equal to 38C (100.4F), Mild Pain (1 - 3)  aluminum hydroxide/magnesium hydroxide/simethicone Suspension 30 milliLiter(s) Oral every 4 hours PRN Dyspepsia  benzocaine/menthol Lozenge 1 Lozenge Oral every 8 hours PRN Sore Throat  melatonin 3 milliGRAM(s) Oral at bedtime PRN Insomnia  ondansetron Injectable 4 milliGRAM(s) IV Push every 8 hours PRN Nausea and/or Vomiting  polyethylene glycol 3350 17 Gram(s) Oral daily PRN Constipation          ***** VITAL SIGNS:   Vital Signs Last 24 Hrs  T(F): 98.5 (23 @ 20:45), Max: 98.5 (23 @ 20:45)  HR: 87 (23 @ 20:45) (87 - 100)  BP: 119/74 (23 @ 20:45) (118/70 - 119/74)  RR: 19 (23 @ 20:45) (18 - 19)  SpO2: 95% (23 @ 20:45) (95% - 95%)  Wt(kg): --    T(F): 98.5 (23 @ 20:45), Max: 98.5 (23 @ 20:45)  HR: 87 (23 @ 20:45) (87 - 100)  BP: 119/74 (23 @ 20:45) (118/70 - 119/74)  RR: 19 (23 @ 20:45) (18 - 19)  SpO2: 95% (23 @ 20:45) (95% - 95%)  Wt(kg): --  I&O's Summary    2023 07:01  -  2023 07:00  --------------------------------------------------------  IN: 0 mL / OUT: 400 mL / NET: -400 mL    2023 07:01  -  2023 22:27  --------------------------------------------------------  IN: 200 mL / OUT: 0 mL / NET: 200 mL             *****PHYSICAL EXAM:   alert oriented x 3 attention comprehension are fair.  Able to name, repeat.   EOmi fundi not visualized   no nystagmus VFF to confrontation  Tongue is midline  Palate elevates symmetrically   Moving all 4 ext spontaneously no drift appreciated    Gait not assessed.            *****LAB AND IMAGIN.4    45.24 )-----------( 14       ( 2023 06:54 )             28.9               02-08    138  |  102  |  14  ----------------------------<  77  3.3<L>   |  25  |  0.64    Ca    9.0      2023 06:50  Phos  2.5     -  Mg     1.7     -    TPro  6.0  /  Alb  3.4  /  TBili  0.7  /  DBili  x   /  AST  19  /  ALT  7<L>  /  AlkPhos  63  -07                         [All pertinent recent Imaging/Reports reviewed]  T(F): 98.5 (23 @ 20:45), Max: 98.5 (23 @ 20:45)  HR: 87 (23 @ 20:45) (87 - 100)  BP: 119/74 (23 @ 20:45) (118/70 - 119/74)  RR: 19 (23 @ 20:45) (18 - 19)  SpO2: 95% (23 @ 20:45) (95% - 95%)  Wt(kg): --         *****A S S E S S M E N T   A N D   P L A N :      82 yr old female with a PMHx of diffuse large B cell lymphoma in remission, non-hodgkin's lymphoma (chemoport), depression, HLD, GERD, 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 She presented here from [facility] for elevated WBC and low hemoglobin, severe weakness. Pt states for the past 2-3 days has been having increased weakness and lethargy, decreased appetite. +sputum productive cough. + cui, no sob, no difficulty breathing. No abdominal pain, nausea, vomiting, no bloody stools. Has endorsed multiple episodes of loose stools x weeks, currently being treated for c.diff with oral vancomycin        Problem/Recommendations 1:  mutism   likely metabolic encephalopathy due to sepsis, abx related vs malnutrition worsening underlying cognitive impairment   gut microbiome depletion due to recurrent abx   consider lactobacillus   now improving spontaneously   thiamine iv  encourage po intake   avoid cefepime   sleep wake cycle regulation   2/6 mental status back to baseline           Problem/Recommendations 2:    leukocytosis   defer to id/onc follow up cultures   flow cytometry    r/o cdiff    low platelet   monitor closely    prognosis guarded         Thank you for allowing me to participate in the care of this patient. Will continue to follow patient periodically. Please do not hesitate to call me if you have any  questions or if there has been a change in patients neurological status     ________________  Alina Wasserman MD  [facility] ([facility]  [phone]      33 minutes spent on total encounter; more than 50 % of the visit was  spent counseling about plan of care, compliance to diet/exercise and medication regimen and or  coordinating care by the attending physician.      It is advised that stroke patients follow up with JUSTIN Umanzor @ [phone] in 1- 2 weeks.   Others please follow up with Dr. Michael Nissenbaum [phone]

## 2023-02-08 NOTE — PROGRESS NOTE ADULT - SUBJECTIVE AND OBJECTIVE BOX
No overt bleeding  Saturating well on RA  No fevers overnight    Vital Signs Last 24 Hrs  T(C): 36.7 (08 Feb 2023 04:54), Max: 36.7 (07 Feb 2023 11:27)  T(F): 98 (08 Feb 2023 04:54), Max: 98.1 (07 Feb 2023 11:27)  HR: 100 (08 Feb 2023 04:54) (78 - 100)  BP: 118/70 (08 Feb 2023 04:54) (109/63 - 170/75)  BP(mean): --  RR: 18 (08 Feb 2023 04:54) (17 - 18)  SpO2: 95% (08 Feb 2023 04:54) (94% - 99%)    I&O's Summary    02-07-23 @ 07:01  -  02-08-23 @ 07:00  --------------------------------------------------------  IN: 0 mL / OUT: 400 mL / NET: -400 mL        PHYSICAL EXAM:  GENERAL: NAD, well-developed, comfortable on room air  HEAD:  Atraumatic, Normocephalic  EYES: EOMI, PERRLA, conjunctiva and sclera clear  NECK: Supple, No JVD  CHEST/LUNG: mild decrease breath sounds bilaterally; No wheeze   HEART: Regular rate and rhythm; No murmurs, rubs, or gallops  ABDOMEN: Soft, Nontender, Nondistended; Bowel sounds present  Neuro: awake alert, back to baseline mental status. no focal weakness  EXTREMITIES:  2+ Peripheral Pulses, No clubbing, cyanosis, trace b/l edema  SKIN: superficial ecchymosis. no bleeding.     LABS:                        9.4    45.24 )-----------( 14       ( 08 Feb 2023 06:54 )             28.9     02-08    138  |  102  |  14  ----------------------------<  77  3.3<L>   |  25  |  0.64    Ca    9.0      08 Feb 2023 06:50  Phos  2.5     02-08  Mg     1.7     02-08    TPro  6.0  /  Alb  3.4  /  TBili  0.7  /  DBili  x   /  AST  19  /  ALT  7<L>  /  AlkPhos  63  02-07      CAPILLARY BLOOD GLUCOSE                RADIOLOGY & ADDITIONAL TESTS:    Imaging Personally Reviewed:  [x] YES  [ ] NO    Will obtain old records:  [ ] YES  [x] NO

## 2023-02-08 NOTE — PROGRESS NOTE ADULT - ASSESSMENT
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 She presented here from [facility] for elevated WBC and low hemoglobin, severe weakness. Pt states for the past 2-3 days has been having increased weakness and lethargy, decreased appetite. +sputum productive cough. + cui, no sob, no difficulty breathing. No abdominal pain, nausea, vomiting, no bloody stools. Has endorsed multiple episodes of loose stools x weeks, currently being treated for c.diff with oral vancomycin.       Fatigue/dyspnea: likely due to severe anemia :  pt s/p 1 unit PRB  Diarrhea  Pulmonary vasculitis  :  hx of Tachycardia :  Recent TTE on 11/3/22 reviewed: normal LV. outpt card Dr. Ady Cooper  Anxiety and depression  GERD (gastroesophageal reflux disease)  Hyperlipidemia.   DVT ppx     1/20:  Fatigue/dyspnea: likely due to severe anemia :  pt s/p 1 unit PRBC: hb now  > 10  : she is on 2 L of oxygen : no wheezing: no overt signs of bleeding : ct chest is abnormal report noted:  has jean-claude ill defined opacities of unclear etiology  : venous ph is mild acidotic:  no need for Bipap at this time : monitor and trend hb  Diarrhea : symptomatic rx:  workup per primary team  Pulmonary vasculitis  : per rheumatology  : had recent vats surgery : LN biopsy noted:   hx of Tachycardia :  Recent TTE on 11/3/22 reviewed: normal LV. outpt card Dr. Ady Cooper  Anxiety and depression  GERD (gastroesophageal reflux disease) : ppi   Hyperlipidemia.   recent covid  : o n last admission she was treated for covid infection:   DVT ppx   d wteam    1/22:    Fatigue/dyspnea: likely due to severe anemia :  pt s/p 1 unit PRBC: hb now  > 10  : she is on 2 L of oxygen : no wheezing: no overt signs of bleeding : ct chest is abnormal report noted:  has jean-claude ill defined opacities of unclear etiology  : venous ph is mild acidotic:  no need for Bipap at this time : monitor and trend hb: she remained stable o gracie last 1 days:  she is not on any antibtiocs at this time: RVP  and blood cultures are negative: she had ebus biopsy also before: reviewed: ID following  Diarrhea : symptomatic rx:  workup per primary team : seems to have resolved  Pulmonary vasculitis  : per rheumatology  : had recent vats surgery : LN biopsy noted:   Bicytopneia and leucocytosis: ? etiology  : for possible bone marrow biopsy on Monday    hx of Tachycardia :  Recent TTE on 11/3/22 reviewed: normal LV. outpt card Dr. Ady Cooper  Anxiety and depression  GERD (gastroesophageal reflux disease) : ppi   Hyperlipidemia.   recent covid  : on last admission she was treated for covid infection:   DVT ppx   dw team    1/23:    Fatigue/dyspnea: likely due to severe anemia : feels weak  but no bleeding noted:  on 2 L of oxygen : she needs PT OT   Diarrhea :resolved:   Pulmonary vasculitis  : per rheumatology  : had recent vats surgery : LN biopsy noted: : she never received teat ment for vasculitis except low dose steroids:  she is awaiting bone marrow biopsy prior to induction therapy with rituximab: The ct chest done on this admission does not show pneumothorax and has jean-claude effusions:  There are some new opacites in rigth lower lobe which were not therre:  clinically she does not seem to have pneumonia: ID following: could it be a prt of vasculitis  Bicytopneia and leucocytosis: ? etiology  : for possible bone marrow biopsy today  hx of Tachycardia :  Recent TTE on 11/3/22 reviewed: normal LV. outpt card Dr. Ady Cooper  Anxiety and depression  GERD (gastroesophageal reflux disease) : ppi   Hyperlipidemia.   recent covid  : on last admission she was treated for covid infection:   DVT ppx   dw team    1/24:    Fatigue/dyspnea: likely due to severe anemia : feels weak  but no bleeding noted:  on 2 L of oxygen : she needs PT OT   Diarrhea :resolved:   Pulmonary vasculitis  : per rheumatology  : had recent vats surgery : LN biopsy noted: : she never received teat ment for vasculitis except low dose steroids:  she is awaiting bone marrow biopsy prior to induction therapy with rituximab: The ct chest done on this admission does not show pneumothorax and has jean-claude effusions:  There are some new opacites in rigth lower lobe which were not therre:  clinically she does not seem to have pneumonia: ID following: could it be a prt of vasculitis  Bicytopneia and leucocytosis: BM biopsy done: await results for eventual immunosuppressives therapy:   hx of Tachycardia :  Recent TTE on 11/3/22 reviewed: normal LV. outpt card Dr. Ady Cooper  Anxiety and depression  GERD (gastroesophageal reflux disease) : ppi   Hyperlipidemia.   recent covid  : on last admission she was treated for covid infection:   DVT ppx   dw team    1/25:    Fatigue/dyspnea: likely due to severe anemia : feels weak  but no bleeding noted:  on 2 L of oxygen : she needs PT OT   Diarrhea :resolved:   Pulmonary vasculitis  : per rheumatology  : had recent vats surgery : LN biopsy noted: : she never received teat ment for vasculitis except low dose steroids:  she is awaiting bone marrow biopsy prior to induction therapy with rituximab: The ct chest done on this admission does not show pneumothorax and has jean-claude effusions:  There are some new opacites in rigth lower lobe which were not there:  clinically she does not seem to have pneumonia: ID following: could it be a part of vasculitis : her resp status has not changed   Bicytopneia and leucocytosis: BM biopsy done: await results for still pending   hx of Tachycardia :  Recent TTE on 11/3/22 reviewed: normal LV. outpt card Dr. Ady Cooper  Anxiety and depression  GERD (gastroesophageal reflux disease) : ppi   Hyperlipidemia.   recent covid  : on last admission she was treated for covid infection:   DVT ppx   dw team: awaiting decision on immunosuppression     1/26;      Fatigue/dyspnea: likely due to severe anemia : feels weak  but no bleeding noted:  on 2 L of oxygen : she needs PT OT : got it yesterday    Diarrhea :resolved:   Pulmonary vasculitis  : per rheumatology  : had recent vats surgery : LN biopsy noted: : she never received teat ment for vasculitis except low dose steroids:  she is awaiting bone marrow biopsy prior to induction therapy with rituximab: The ct chest done on this admission does not show pneumothorax and has jean-claude effusions:  There are some new opacities in rigth lower lobe which were not there:  clinically she does not seem to have pneumonia: ID following: could it be a part of vasculitis : her resp status has not changed  : being observed off antibiotics   Bicytopneia and leucocytosis: BM biopsy done: await results for still pending   hx of Tachycardia :  Recent TTE on 11/3/22 reviewed: normal LV. outpt card Dr. Ady Cooper  Anxiety and depression  GERD (gastroesophageal reflux disease) : ppi   Hyperlipidemia.   recent covid  : on last admission she was treated for covid infection:   DVT ppx   dw team: awaiting decision on immunosuppression     1/27:    Fatigue/dyspnea: likely due to severe anemia : feels weak  but no bleeding noted:  on 2 L of oxygen : cont  PT OT :   Diarrhea :resolved:   Pulmonary vasculitis  : per rheumatology  : had recent vats surgery : LN biopsy noted: : she never received teat ment for vasculitis except low dose steroids:  she is awaiting bone marrow biopsy prior to induction therapy with rituximab: The ct chest done on this admission does not show pneumothorax and has jean-claude effusions:  There are some new opacities in rigth lower lobe which were not there:  clinically she does not seem to have pneumonia: ID following: could it be a part of vasculitis : her resp status has not changed  : being observed off antibiotics :  on bactrim prophylaxis as she is on chr steroids   Bicytopneia and leucocytosis: BM biopsy done: await results for still pending   hx of Tachycardia :  Recent TTE on 11/3/22 reviewed: normal LV. outpt card Dr. Ady Cooper  Anxiety and depression  GERD (gastroesophageal reflux disease) : ppi   Hyperlipidemia.   recent covid  : on last admission she was treated for covid infection:   DVT ppx   dw team: awaiting decision on immunosuppression     1/29:    Fatigue/dyspnea: likely due to severe anemia : feels weak  but no bleeding noted:  on 2 L of oxygen : cont  PT OT :   Diarrhea :resolved:   Pulmonary vasculitis  : per rheumatology  : had recent vats surgery : LN biopsy noted: : she never received treat ment for vasculitis except low dose steroids:  she is awaiting bone marrow biopsy prior to induction therapy with rituximab: The ct chest done on this admission does not show pneumothorax and has jean-claude effusions:  There are some new opacities in rigth lower lobe which were not there:  clinically she does not seem to have pneumonia: ID following: could it be a part of vasculitis : her resp status has not changed  : being observed off antibiotics :  on bactrim prophylaxis as she is on chr steroids   Bicytopneia and leucocytosis: BM biopsy: results reviewed defer to [facility]  hx of Tachycardia :  Recent TTE on 11/3/22 reviewed: normal LV.   Anxiety and depression  GERD (gastroesophageal reflux disease) : ppi   Hyperlipidemia.   recent covid  : on last admission she was treated for covid infection:   DVT ppx   dw team: awaiting decision on immunosuppression     1/30:    Fatigue/dyspnea: likely due to severe anemia : feels weak  but no bleeding noted:  on 2 L of oxygen : cont  PT OT : sitting in chair today   Diarrhea :resolved:   Pulmonary vasculitis  : per rheumatology  : had recent vats surgery : LN biopsy noted: : she never received treat ment for vasculitis except low dose steroids:  she is awaiting bone marrow biopsy prior to induction therapy with rituximab: The ct chest done on this admission does not show pneumothorax and has jean-claude effusions:  There are some new opacities in rigth lower lobe which were not there:  clinically she does not seem to have pneumonia: ID following: could it be a part of vasculitis : her resp status has not changed  : being observed off antibiotics :  on bactrim prophylaxis as she is on chr steroids  /l however she had low grade tempt today : rvp is negative : cultures sent: ID follow up   Bicytopneia and leucocytosis: BM biopsy: results reviewed defer to [facility]  hx of Tachycardia :  Recent TTE on 11/3/22 reviewed: normal LV.   Anxiety and depression  GERD (gastroesophageal reflux disease) : ppi   Hyperlipidemia.   recent covid  : on last admission she was treated for covid infection:   DVT ppx   dw team: awaiting decision on immunosuppression     1/31:    Fatigue/dyspnea: likely due to severe anemia : feels weak  but no bleeding noted:  on 2 L of oxygen : cont  PT OT : lying in bed:   Diarrhea :resolved:   Pulmonary vasculitis  : per rheumatology  : had recent vats surgery : LN biopsy noted: : she never received treat ment for vasculitis except low dose steroids:  she is awaiting bone marrow biopsy prior to induction therapy with rituximab: The ct chest done on this admission does not show pneumothorax and has jean-claude effusions:  There are some new opacities in rigth lower lobe which were not there:  clinically she does not seem to have pneumonia: ID following: could it be a part of vasculitis : her resp status has not changed  : being observed off antibiotics :  on bactrim prophylaxis as she is on chr steroids : she seems OK:  no sob:     Bicytopneia and leucocytosis: BM biopsy: results reviewed defer to [facility] ; for eventual chemo   hx of Tachycardia :  Recent TTE on 11/3/22 reviewed: normal LV.   Anxiety and depression  GERD (gastroesophageal reflux disease) : ppi   Hyperlipidemia.   recent covid  : on last admission she was treated for covid infection:   DVT ppx   dw team: awaiting decision on immunosuppression     2/1:    Fatigue/dyspnea: likely due to severe anemia : feels weak  but no bleeding noted:  on 2 L of oxygen : cont  PT OT : lying in bed:  her blood cultures are contaminant:   Diarrhea :resolved:   Pulmonary vasculitis  : per rheumatology  : had recent vats surgery : LN biopsy noted: : she never received treat ment for vasculitis except low dose steroids:  she is awaiting bone marrow biopsy prior to induction therapy with rituximab: The ct chest done on this admission does not show pneumothorax and has jean-claude effusions:  There are some new opacities in rigth lower lobe which were not there:  clinically she does not seem to have pneumonia: ID following: could it be a part of vasculitis : her resp status has not changed  : being observed off antibiotics :  on bactrim prophylaxis as she is on chr steroids : she seems OK:  no sob:     Bicytopneia and leucocytosis: BM biopsy: results reviewed defer to [facility] ; for eventual chemo   hx of Tachycardia :  Recent TTE on 11/3/22 reviewed: normal LV.   Anxiety and depression  GERD (gastroesophageal reflux disease) : ppi   Hyperlipidemia.   recent covid  : on last admission she was treated for covid infection:   DVT ppx   dw team: awaiting decision on immunosuppression: overall her general condition seems very poor and very weak:     2/2:    Fatigue/dyspnea: likely due to severe anemia : feels weak  but no bleeding noted:  on 2 L of oxygen : cont  PT OT : lying in bed:  her blood cultures are positive E FAECALIS :  ON MEROPENEM  Diarrhea :resolved:   Pulmonary vasculitis  : per rheumatology  : had recent vats surgery : LN biopsy noted: : she never received treat ment for vasculitis except low dose steroids:  she is awaiting bone marrow biopsy prior to induction therapy with rituximab: The ct chest done on this admission does not show pneumothorax and has jean-claude effusions:  There are some new opacities in rigth lower lobe which were not there:  clinically she does not seem to have pneumonia: ID following: could it be a part of vasculitis : her resp status has not changed  : being observed off antibiotics :  on bactrim prophylaxis as she is on chr steroids : she seems OK:  no sob:     Bicytopneia and leucocytosis: BM biopsy: results reviewed defer to [facility] ; for eventual chemo   hx of Tachycardia :  Recent TTE on 11/3/22 reviewed: normal LV.   Anxiety and depression  GERD (gastroesophageal reflux disease) : ppi   Hyperlipidemia.   recent covid  : on last admission she was treated for covid infection:   DVT ppx   dw team: awaiting decision on immunosuppression: overall her general condition seems very poor and very weak: ON ANTIBIOTICS     2/3   Pulmonary Vasculitis  -Steroids per rheum  -Plan for eventual Rituxan  -Bactrim for PCP ppx  MDS  -Per heme/onc  Bacteremia  -E. Faecalis bacteremia  -ABX per ID  Epistaxis  -Per ENT  -Breathing comfortably on 40% humidified face tent, keep sats >90%    2/6:    2/6  Pulmonary Vasculitis  -Steroids per rheum  -Plan for eventual Rituxan /l she is on room air at this time  -Bactrim for PCP ppx  MDS  -Per heme/onc  Bacteremia  -E. Faecalis bacteremia  -ABX per ID  Epistaxis  -Per ENT  - she is on room air today  : cont to mantain o2 sao2 above 90% all the ti me:    [facility]      2/7:  Pulmonary Vasculitis  -Steroids per rheum  -Plan for eventual Rituxan /l she is on room air at this time  -Bactrim for PCP ppx  MDS  -Per heme/onc  Bacteremia  -E. Faecalis bacteremia  -ABX per ID : awaiting clearance from id for chemo : last blood cultures have been negative  Epistaxis  -Per ENT  - she is on room air today  : cont to mantain o2 sao2 above 90% all the ti me:    [facility]    2/8:  Pulmonary Vasculitis  -Steroids per rheum  -Plan for eventual Rituxan /l she is on room air at this time  -Bactrim for PCP ppx  MDS  -Per heme/onc  Bacteremia  -E. Faecalis bacteremia  -ABX per ID : awaiting clearance from id for chemo : last blood cultures have been negative : finishing antbiotics today  :  Epistaxis  -Per ENT  - she is on room air today  : cont to mantain o2 sao2 above 90% all the ti me:  Thrombocytopenia:  sign today  : 14k: no obvious bleeding: cont to monitorial : transfuse per [facility]    [facility]

## 2023-02-08 NOTE — PROGRESS NOTE ADULT - ASSESSMENT
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 She presented here from [facility] for elevated WBC and low hemoglobin, severe weakness. Pt states for the past 2-3 days has been having increased weakness and lethargy, decreased appetite. +sputum productive cough. + cui, no sob, no difficulty breathing. No abdominal pain, nausea, vomiting, no bloody stools. Has endorsed multiple episodes of loose stools x weeks, currently being treated for c.diff with oral vancomycin. Nephrology consulted for Hyponatremia.       A/P     HYponatremia   likely 2/2 dehydration / hypotonic solution /hyperglycemia   corrected Na 132 02/6/23   got vanco in D5   avoid hypotonic solution   lasix on hold Feb 4  patient is not eating and drinking   clinically dehydrated  s/p sodium bicarb 75cc/hr x1L 02/6/23  Na remains stable   Avoid overcorrection >6-8meq a day   monitor Na closely     Acidosis without anion gap   patient had diarrhea   s/p Sodium bicarb 75cc/hr x 1L 02/6/23  stable   monitor     Hypokalemia   s/p kcl 40meq today   give supplement as needed   monitor K     HTN   stable   monitor BP closely     Anemia   heme/onc on board   PRBC as needed   monitor H/H     hypophosphatemia   suggest to give po4 supplement   monitor

## 2023-02-09 NOTE — PROGRESS NOTE ADULT - ASSESSMENT
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 Patient was sent to Rehab. Subsequently on 1/18 patient is readmitted for general weakness w/hb 5. Rheumatology was consulted for further help in management.    #AAV-MPO  -Patient w/hx of mononeuritis multiplex, kidney  and lung involvement,  -Previous treatment with RTX, transitioned to AZA and off any therapy while receiving R-CHOP   -CT chest IN 11/2022 Increased mediastinal lymphadenopathy. A reference low right paratracheal node measures 2.4 x 1.8 cm (2, 39), interval increase in size from prior when it measured 2.0 x 1.2 cm. -11/10/22 R wedge resection showed capillaritis with fibrin, giant cells and inflammation around small and intermediate sized vessels. Elastic stain showed vasculocentricity of the inflammation. 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 faecalis and MRSE  - finishing abx 2/10      Plan  -c/w 20 mg prednisone daily  -will plan on Rituximab 1 g IV early next week (with subsequent dose two weeks after)  -ID recs appreciated  -Hematology recs appreciated: Myelodysplastic treatment being planned: will coordinate  -transfusions prn  -will need close monitoring of blood counts if being treated concurrently with MDS txt and Rituximab  -c/w PCP prophylaxis and GI ppx   -please ensure patient gets daily PT in bed if she cannot walk    To be discussed with Dr. 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 Patient was sent to Rehab. Subsequently on 1/18 patient is readmitted for general weakness w/hb 5. Rheumatology was consulted for further help in management.    #AAV-MPO  -Patient w/hx of mononeuritis multiplex, kidney  and lung involvement,  -Previous treatment with RTX, transitioned to AZA and off any therapy while receiving R-CHOP   -CT chest IN 11/2022 Increased mediastinal lymphadenopathy. A reference low right paratracheal node measures 2.4 x 1.8 cm (2, 39), interval increase in size from prior when it measured 2.0 x 1.2 cm. -11/10/22 R wedge resection showed capillaritis with fibrin, giant cells and inflammation around small and intermediate sized vessels. Elastic stain showed vasculocentricity of the inflammation. 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 faecalis and MRSE  - finishing abx 2/10      Plan  -c/w 20 mg prednisone daily  -Hematology recs appreciated: Plan for Dacogen x 5 days starting tomorrow  -plan to give Rituximab 1 g IV after Dacogen depending on lab results  -ID recs appreciated  -transfusions prn  -c/w PCP prophylaxis and GI ppx   -please ensure patient gets daily PT in bed if she cannot walk    Discussed with Dr. 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 Patient was sent to Rehab. Subsequently on 1/18 patient is readmitted for general weakness w/hb 5. Rheumatology was consulted for further help in management.    #AAV-MPO  -Patient w/hx of mononeuritis multiplex, kidney  and lung involvement,  -Previous treatment with RTX, transitioned to AZA and off any therapy while receiving R-CHOP   -CT chest IN 11/2022 Increased mediastinal lymphadenopathy. A reference low right paratracheal node measures 2.4 x 1.8 cm (2, 39), interval increase in size from prior when it measured 2.0 x 1.2 cm. -11/10/22 R wedge resection showed capillaritis with fibrin, giant cells and inflammation around small and intermediate sized vessels. Elastic stain showed vasculocentricity of the inflammation. 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 faecalis and MRSE  - finishing abx 2/10      Plan  -c/w 20 mg prednisone daily  -Hematology recs appreciated: Plan for Dacogen x 5 days starting tomorrow  -plan to give Rituximab 1 g IV after 5 days of Dacogen txt and dependent on lab results  -ID recs appreciated  -transfusions prn  -c/w PCP prophylaxis and GI ppx   -please ensure patient gets daily PT in bed if she cannot walk    Discussed with Dr. Garcia, Attending and Hematology    Jesus Ritter MD  Rheumatology Fellow, PGY-5  Available on TEAMS

## 2023-02-09 NOTE — PROGRESS NOTE ADULT - SUBJECTIVE AND OBJECTIVE BOX
INTERVAL HPI/OVERNIGHT EVENTS:  Patient lying in bed. She feels better. Still not able to ambulate. Denies fevers, chills, chest pain, shortness of breath, joint pain. Endorses left foot weakness for weeks. Denies further epistaxis.     MEDICATIONS  (STANDING):  budesonide 160 MICROgram(s)/formoterol 4.5 MICROgram(s) Inhaler 2 Puff(s) Inhalation two times a day  chlorhexidine 2% Cloths 1 Application(s) Topical daily  cholecalciferol 2000 Unit(s) Oral daily  citalopram 10 milliGRAM(s) Oral daily  fluticasone propionate 50 MICROgram(s)/spray Nasal Spray 1 Spray(s) Both Nostrils two times a day  folic acid 1 milliGRAM(s) Oral daily  guaiFENesin  milliGRAM(s) Oral every 12 hours  influenza  Vaccine (HIGH DOSE) 0.7 milliLiter(s) IntraMuscular once  ipratropium    for Nebulization 500 MICROGram(s) Nebulizer every 6 hours  lactobacillus acidophilus 1 Tablet(s) Oral daily  metoprolol tartrate 25 milliGRAM(s) Oral two times a day  mirtazapine 7.5 milliGRAM(s) Oral daily  pantoprazole    Tablet 40 milliGRAM(s) Oral before breakfast  predniSONE   Tablet 20 milliGRAM(s) Oral daily  senna 1 Tablet(s) Oral daily  sodium chloride 0.65% Nasal 2 Spray(s) Both Nostrils every 6 hours  trimethoprim  160 mG/sulfamethoxazole 800 mG 1 Tablet(s) Oral daily  vancomycin  IVPB 1000 milliGRAM(s) IV Intermittent every 24 hours    MEDICATIONS  (PRN):  acetaminophen     Tablet .. 650 milliGRAM(s) Oral every 6 hours PRN Temp greater or equal to 38C (100.4F), Mild Pain (1 - 3)  aluminum hydroxide/magnesium hydroxide/simethicone Suspension 30 milliLiter(s) Oral every 4 hours PRN Dyspepsia  benzocaine/menthol Lozenge 1 Lozenge Oral every 8 hours PRN Sore Throat  melatonin 3 milliGRAM(s) Oral at bedtime PRN Insomnia  ondansetron Injectable 4 milliGRAM(s) IV Push every 8 hours PRN Nausea and/or Vomiting  polyethylene glycol 3350 17 Gram(s) Oral daily PRN Constipation        Vital Signs Last 24 Hrs  T(C): 36.8 (09 Feb 2023 11:31), Max: 37 (09 Feb 2023 04:20)  T(F): 98.3 (09 Feb 2023 11:31), Max: 98.6 (09 Feb 2023 04:20)  HR: 90 (09 Feb 2023 11:31) (87 - 100)  BP: 122/68 (09 Feb 2023 11:31) (109/60 - 136/82)  BP(mean): --  RR: 18 (09 Feb 2023 11:31) (18 - 19)  SpO2: 92% (09 Feb 2023 11:31) (92% - 95%)    Parameters below as of 09 Feb 2023 11:31  Patient On (Oxygen Delivery Method): room air        PHYSICAL EXAMINATION:  General: No apparent distress  HEENT: EOMI,   CVS: +S1/S2, RRR, no murmurs/rubs/gallops, Chemo-port  Resp: On 3L of ncO2. Hypoventilation in the L>R  GI: Soft, NT/ND +BS  MSK:   MSK: no synovitis, joints non-tender; dorsiflexion: 3/5 L  Skin: non blanching multiple purpuric lesions in the lower extremities, arms, and chest       LABS:                        9.0    49.53 )-----------( 10       ( 09 Feb 2023 06:55 )             26.7     02-09    133<L>  |  99  |  15  ----------------------------<  73  3.7   |  22  |  0.72    Ca    8.7      09 Feb 2023 06:55  Phos  2.5     02-08  Mg     1.7     02-08    TPro  6.1  /  Alb  3.3  /  TBili  0.7  /  DBili  x   /  AST  17  /  ALT  6<L>  /  AlkPhos  71  02-09            RADIOLOGY & ADDITIONAL TESTS:

## 2023-02-09 NOTE — PROGRESS NOTE ADULT - SUBJECTIVE AND OBJECTIVE BOX
Date of Service: 02-09-23 @ 11:51    Patient is a 82y old  Female who presents with a chief complaint of weakness (09 Feb 2023 10:13)      Any change in ROS: pulm wise he she looks pretty good:  on room air:     MEDICATIONS  (STANDING):  budesonide 160 MICROgram(s)/formoterol 4.5 MICROgram(s) Inhaler 2 Puff(s) Inhalation two times a day  chlorhexidine 2% Cloths 1 Application(s) Topical daily  cholecalciferol 2000 Unit(s) Oral daily  citalopram 10 milliGRAM(s) Oral daily  fluticasone propionate 50 MICROgram(s)/spray Nasal Spray 1 Spray(s) Both Nostrils two times a day  folic acid 1 milliGRAM(s) Oral daily  guaiFENesin  milliGRAM(s) Oral every 12 hours  influenza  Vaccine (HIGH DOSE) 0.7 milliLiter(s) IntraMuscular once  ipratropium    for Nebulization 500 MICROGram(s) Nebulizer every 6 hours  lactobacillus acidophilus 1 Tablet(s) Oral daily  metoprolol tartrate 25 milliGRAM(s) Oral two times a day  mirtazapine 7.5 milliGRAM(s) Oral daily  pantoprazole    Tablet 40 milliGRAM(s) Oral before breakfast  predniSONE   Tablet 20 milliGRAM(s) Oral daily  senna 1 Tablet(s) Oral daily  sodium chloride 0.65% Nasal 2 Spray(s) Both Nostrils every 6 hours  trimethoprim  160 mG/sulfamethoxazole 800 mG 1 Tablet(s) Oral daily  vancomycin  IVPB 1000 milliGRAM(s) IV Intermittent every 24 hours    MEDICATIONS  (PRN):  acetaminophen     Tablet .. 650 milliGRAM(s) Oral every 6 hours PRN Temp greater or equal to 38C (100.4F), Mild Pain (1 - 3)  aluminum hydroxide/magnesium hydroxide/simethicone Suspension 30 milliLiter(s) Oral every 4 hours PRN Dyspepsia  benzocaine/menthol Lozenge 1 Lozenge Oral every 8 hours PRN Sore Throat  melatonin 3 milliGRAM(s) Oral at bedtime PRN Insomnia  ondansetron Injectable 4 milliGRAM(s) IV Push every 8 hours PRN Nausea and/or Vomiting  polyethylene glycol 3350 17 Gram(s) Oral daily PRN Constipation    Vital Signs Last 24 Hrs  T(C): 36.8 (09 Feb 2023 11:31), Max: 37 (09 Feb 2023 04:20)  T(F): 98.3 (09 Feb 2023 11:31), Max: 98.6 (09 Feb 2023 04:20)  HR: 90 (09 Feb 2023 11:31) (87 - 100)  BP: 122/68 (09 Feb 2023 11:31) (109/60 - 136/82)  BP(mean): --  RR: 18 (09 Feb 2023 11:31) (18 - 19)  SpO2: 92% (09 Feb 2023 11:31) (92% - 95%)    Parameters below as of 09 Feb 2023 11:31  Patient On (Oxygen Delivery Method): room air        I&O's Summary    08 Feb 2023 07:01  -  09 Feb 2023 07:00  --------------------------------------------------------  IN: 200 mL / OUT: 400 mL / NET: -200 mL          Physical Exam:   GENERAL: NAD, well-groomed, well-developed  HEENT: RANJIT/   Atraumatic, Normocephalic  ENMT: No tonsillar erythema, exudates, or enlargement; Moist mucous membranes, Good dentition, No lesions  NECK: Supple, No JVD, Normal thyroid  CHEST/LUNG: Clear to ausculation scattered crackles   CVS: Regular rate and rhythm; No murmurs, rubs, or gallops  GI: : Soft, Nontender, Nondistended; Bowel sounds present  NERVOUS SYSTEM:  Alert & Oriented X3  EXTREMITIES: -edema  LYMPH: No lymphadenopathy noted  SKIN: No rashes or lesions  ENDOCRINOLOGY: No Thyromegaly  PSYCH: calm     Labs:                              9.0    49.53 )-----------( 10       ( 09 Feb 2023 06:55 )             26.7                         9.4    45.24 )-----------( 14       ( 08 Feb 2023 06:54 )             28.9                         8.9    31.08 )-----------( 32       ( 07 Feb 2023 07:01 )             26.4                         10.4   28.03 )-----------( 13       ( 06 Feb 2023 06:58 )             31.2     02-09    133<L>  |  99  |  15  ----------------------------<  73  3.7   |  22  |  0.72  02-08    138  |  102  |  14  ----------------------------<  77  3.3<L>   |  25  |  0.64  02-07    137  |  100  |  17  ----------------------------<  67<L>  3.2<L>   |  27  |  0.61  02-06    127<L>  |  95<L>  |  22  ----------------------------<  401<H>  4.5   |  18<L>  |  0.60  02-06    139  |  104  |  20  ----------------------------<  74  3.7   |  22  |  0.59    Ca    8.7      09 Feb 2023 06:55  Ca    9.0      08 Feb 2023 06:50  Phos  2.5     02-08  Mg     1.7     02-08    TPro  6.1  /  Alb  3.3  /  TBili  0.7  /  DBili  x   /  AST  17  /  ALT  6<L>  /  AlkPhos  71  02-09  TPro  6.0  /  Alb  3.4  /  TBili  0.7  /  DBili  x   /  AST  19  /  ALT  7<L>  /  AlkPhos  63  02-07  TPro  6.1  /  Alb  3.4  /  TBili  0.7  /  DBili  0.2  /  AST  24  /  ALT  8<L>  /  AlkPhos  69  02-06    CAPILLARY BLOOD GLUCOSE          LIVER FUNCTIONS - ( 09 Feb 2023 06:55 )  Alb: 3.3 g/dL / Pro: 6.1 g/dL / ALK PHOS: 71 U/L / ALT: 6 U/L / AST: 17 U/L / GGT: x             rad< from: Xray Chest 1 View- PORTABLE-Urgent (Xray Chest 1 View- PORTABLE-Urgent .) (02.01.23 @ 11:14) >  IBRAGIMOV     PROCEDURE DATE:  02/01/2023          INTERPRETATION:  EXAMINATION: XR CHEST URGENT    CLINICAL INDICATION: r/o aspiration, confusion    TECHNIQUE: Single frontal,portable view of the chest was obtained.    COMPARISON: Chest x-ray 1/30/2023    FINDINGS:    LINES/TUBES: Left chest wall medication port with tip in the right atrium.    LUNGS/PLEURA: Increase in bilateral lower lung hazy opacity. Question   trace right effusion. No left pleural effusion. No pneumothorax.    HEART AND MEDIASTINUM: The heart size is not accurately measured in this   projection.    SKELETON: No acute osseous abnormalities.    IMPRESSION:    Bibasilar hazy opacification. This may represent atelectasis or pneumonia    --- End of Report ---          JAMIE DURAN MD; Resident Radiologist  This document has been electronically signed.  LOAN TODD MD; Attending Radiologist  This document has been electronically signed. Feb 1 2023  4:03PM    < end of copied text >          RECENT CULTURES:        RESPIRATORY CULTURES:          Studies  Chest X-RAY  CT SCAN Chest   Venous Dopplers: LE:   CT Abdomen  Others

## 2023-02-09 NOTE — PROGRESS NOTE ADULT - SUBJECTIVE AND OBJECTIVE BOX
INTERVAL HPI/OVERNIGHT EVENTS:  Patient S&E at bedside. No o/n events, Denied fever/chills, SOB at rest, chest/abd pain, changes with urination and bowel movement.    VITAL SIGNS:  T(F): 98.3 (02-09-23 @ 11:31)  HR: 90 (02-09-23 @ 11:31)  BP: 122/68 (02-09-23 @ 11:31)  RR: 18 (02-09-23 @ 11:31)  SpO2: 92% (02-09-23 @ 11:31)  Wt(kg): --    PHYSICAL EXAM:    Constitutional: NAD  Eyes: EOMI, sclera non-icteric  Neck: supple  Respiratory: CTAB, no wheezes or crackles   Cardiovascular: RRR  Gastrointestinal: soft, NTND, + BS  Extremities: no cyanosis, clubbing or edema   Neurological: awake and alert      MEDICATIONS  (STANDING):  budesonide 160 MICROgram(s)/formoterol 4.5 MICROgram(s) Inhaler 2 Puff(s) Inhalation two times a day  chlorhexidine 2% Cloths 1 Application(s) Topical daily  cholecalciferol 2000 Unit(s) Oral daily  citalopram 10 milliGRAM(s) Oral daily  fluticasone propionate 50 MICROgram(s)/spray Nasal Spray 1 Spray(s) Both Nostrils two times a day  folic acid 1 milliGRAM(s) Oral daily  guaiFENesin  milliGRAM(s) Oral every 12 hours  influenza  Vaccine (HIGH DOSE) 0.7 milliLiter(s) IntraMuscular once  ipratropium    for Nebulization 500 MICROGram(s) Nebulizer every 6 hours  lactobacillus acidophilus 1 Tablet(s) Oral daily  metoprolol tartrate 25 milliGRAM(s) Oral two times a day  mirtazapine 7.5 milliGRAM(s) Oral daily  pantoprazole    Tablet 40 milliGRAM(s) Oral before breakfast  predniSONE   Tablet 20 milliGRAM(s) Oral daily  senna 1 Tablet(s) Oral daily  sodium chloride 0.65% Nasal 2 Spray(s) Both Nostrils every 6 hours  trimethoprim  160 mG/sulfamethoxazole 800 mG 1 Tablet(s) Oral daily  vancomycin  IVPB 1000 milliGRAM(s) IV Intermittent every 24 hours    MEDICATIONS  (PRN):  acetaminophen     Tablet .. 650 milliGRAM(s) Oral every 6 hours PRN Temp greater or equal to 38C (100.4F), Mild Pain (1 - 3)  aluminum hydroxide/magnesium hydroxide/simethicone Suspension 30 milliLiter(s) Oral every 4 hours PRN Dyspepsia  benzocaine/menthol Lozenge 1 Lozenge Oral every 8 hours PRN Sore Throat  melatonin 3 milliGRAM(s) Oral at bedtime PRN Insomnia  ondansetron Injectable 4 milliGRAM(s) IV Push every 8 hours PRN Nausea and/or Vomiting  polyethylene glycol 3350 17 Gram(s) Oral daily PRN Constipation      Allergies    codeine (Nausea)  doxycycline (Nausea)  penicillin (Hives)    Intolerances        LABS:                        9.0    49.53 )-----------( 10       ( 09 Feb 2023 06:55 )             26.7     02-09    133<L>  |  99  |  15  ----------------------------<  73  3.7   |  22  |  0.72    Ca    8.7      09 Feb 2023 06:55  Phos  2.5     02-08  Mg     1.7     02-08    TPro  6.1  /  Alb  3.3  /  TBili  0.7  /  DBili  x   /  AST  17  /  ALT  6<L>  /  AlkPhos  71  02-09          RADIOLOGY & ADDITIONAL TESTS:  Studies reviewed.

## 2023-02-09 NOTE — PROGRESS NOTE ADULT - ASSESSMENT
Patient is a 82 year old female with a PMH of diffuse large B cell lymphoma in remission, non-hodgkin's lymphoma (chemoport), depression, HLD, GERD, PE/DVT (4/2021 no longer on Eliquis), ANCA-vasculitis (20 y/a, no meds), s/p LVATS, LUIS wedge resection (2021), recent direct admit for RVATS, RUL Nodule Biopsy w/ IR marking in [facility], path favoring vasculitis, treated with Decadron, then switched to PO prednisone, went to [facility]. She presented here from [facility] for elevated WBC and low hemoglobin, severe weakness. Pt states for the past 2-3 days has been having increased weakness and lethargy, decreased appetite. Reports chronic cough, currently nonproductive - RVP/COVID negative. Has multiple episodes of loose stools x weeks, reported recently trying marijuana for appetite and noted worsening. She was being treated empirically for c.diff with oral vancomycin but then became constipated, s/p bowel regimen and having normal bowel movements. Patient initially being monitored off antibiotics given she was afebrile, WBC was stable and no infectious source was found. She had a fever 100.9F on 1/30 overnight with worsening leukocytosis and then 101.4F overnight 1/31 and noted with confusion.     Sepsis due to gram positive bacteremia    E. faecalis bacteremia - unclear source, ?GI, less likely    Staph epi bacteremia ?skin vs port source vs contamination  AMS - neurotoxicity d/t cefepime vs infectious etiology   --d/c cefepime 1/31, mental status improved   H/o PCN allergy with hives  - sepsis resolved - afebrile, WBC trend noted, Bcx cleared   - L chest port without sign of infection  - 2/1 CXR with bibasilar hazy opacification - may be atelectasis or pneumonia   - 1/30 Bcx (1 set sent) with Staph epi - MRSE -- sensitivities noted   - 1/31 Bcx (1 set sent) - aerobic bottle grew E. faecalis (amp and vanc sensitive) and MRSE  - 2/1 repeat Bcx -- Negative x2   - TTE w/o mention of vegetations  - continue vancomycin 1g IV Q24h -- complete total 10d course from negative Bcx- end tomorrow 2/10   -- monitor vancomycin trough, goal trough 15-20 or -600  - monitor temps/CBC    UTI with E. faecium-VRE  - Ucx grew VRE-E. 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  H/o DLBCL, EBV+   - noted patient had similar presentation with bicytopenia and leukocytosis in the past, may need tx with rituximab   - s/p BMBx 1/23 - found with myelodysplastic syndrome    - quantiferon indeterminate - pt on steroids which can cause indeterminate results    - CT - s/p wedge resections in b/l upper lobes, 2 cm nodular opacity a/w staple line in RUL; multiple ill-defined b/l opacities more in RLL -unclear etiology, b/l effusions R>L   - continue on Bactrim DS 1 tablet daily for PCP ppx while on prednisone   - Rheum and Heme/Onc following - planning for rituximab/chemotherapy     -- pt afebrile, completed UTI treatment, and has cleared bacteremia 2/1 -- no absolute contraindication ID standpoint at this time     L epistaxis   - ENT following, packing removed      Yevgeniy Malave M.D.  [facility], Division of Infectious Diseases  [phone]  After 5pm on weekdays and all day on weekends - please call [phone]

## 2023-02-09 NOTE — PROGRESS NOTE ADULT - SUBJECTIVE AND OBJECTIVE BOX
[facility] [place] [facility]PHROLOGY PRACTICE   MD RONEL FRIAS MD, PA KRISTINE SOLTANPOUR, DO INJUNG KO, NP    TEL:  OFFICE: [phone]    From 5pm-7am Answering Service [phone]    -- RENAL FOLLOW UP NOTE ---Date of Service 02-09-23 @ 13:22    Patient is a 82y old  Female who presents with a chief complaint of weakness (09 Feb 2023 12:51)      Patient seen and examined at bedside. No chest pain/sob    VITALS:  T(F): 98.3 (02-09-23 @ 11:31), Max: 98.6 (02-09-23 @ 04:20)  HR: 90 (02-09-23 @ 11:31)  BP: 122/68 (02-09-23 @ 11:31)  RR: 18 (02-09-23 @ 11:31)  SpO2: 92% (02-09-23 @ 11:31)  Wt(kg): --    02-08 @ 07:01  -  02-09 @ 07:00  --------------------------------------------------------  IN: 200 mL / OUT: 400 mL / NET: -200 mL          PHYSICAL EXAM:  Constitutional: NAD  Neck: No JVD  Respiratory: CTAB, no wheezes, rales or rhonchi  Cardiovascular: S1, S2, RRR  Gastrointestinal: BS+, soft, NT/ND  Extremities: No peripheral edema    Hospital Medications:   MEDICATIONS  (STANDING):  budesonide 160 MICROgram(s)/formoterol 4.5 MICROgram(s) Inhaler 2 Puff(s) Inhalation two times a day  chlorhexidine 2% Cloths 1 Application(s) Topical daily  cholecalciferol 2000 Unit(s) Oral daily  citalopram 10 milliGRAM(s) Oral daily  fluticasone propionate 50 MICROgram(s)/spray Nasal Spray 1 Spray(s) Both Nostrils two times a day  folic acid 1 milliGRAM(s) Oral daily  guaiFENesin  milliGRAM(s) Oral every 12 hours  influenza  Vaccine (HIGH DOSE) 0.7 milliLiter(s) IntraMuscular once  ipratropium    for Nebulization 500 MICROGram(s) Nebulizer every 6 hours  lactobacillus acidophilus 1 Tablet(s) Oral daily  metoprolol tartrate 25 milliGRAM(s) Oral two times a day  mirtazapine 7.5 milliGRAM(s) Oral daily  pantoprazole    Tablet 40 milliGRAM(s) Oral before breakfast  predniSONE   Tablet 20 milliGRAM(s) Oral daily  senna 1 Tablet(s) Oral daily  sodium chloride 1 Gram(s) Oral three times a day  sodium chloride 0.65% Nasal 2 Spray(s) Both Nostrils every 6 hours  trimethoprim  160 mG/sulfamethoxazole 800 mG 1 Tablet(s) Oral daily  vancomycin  IVPB 1000 milliGRAM(s) IV Intermittent every 24 hours      LABS:  02-09    133<L>  |  99  |  15  ----------------------------<  73  3.7   |  22  |  0.72    Ca    8.7      09 Feb 2023 06:55  Phos  2.5     02-08  Mg     1.7     02-08    TPro  6.1  /  Alb  3.3  /  TBili  0.7  /  DBili      /  AST  17  /  ALT  6<L>  /  AlkPhos  71  02-09    Creatinine Trend: 0.72 <--, 0.64 <--, 0.61 <--, 0.60 <--, 0.59 <--, 0.68 <--, 0.86 <--, 0.87 <--    Albumin, Serum: 3.3 g/dL (02-09 @ 06:55)                              9.0    49.53 )-----------( 10       ( 09 Feb 2023 06:55 )             26.7     Urine Studies:  Urinalysis - [02-01-23 @ 09:57]      Color Yellow / Appearance Slightly Turbid / SG 1.033 / pH 7.0      Gluc Negative / Ketone Trace  / Bili Negative / Urobili Negative       Blood Small / Protein 300 mg/dL / Leuk Est Negative / Nitrite Negative      RBC 4 /  / Hyaline 4 / Gran  / Sq Epi  / Non Sq Epi 10 / Bacteria Moderate    Urine Sodium 169      [02-07-23 @ 07:13]  Urine Osmolality 814      [02-07-23 @ 07:13]    Iron 152, TIBC 180, %sat 84      [01-19-23 @ 11:19]  Ferritin 5768      [01-19-23 @ 11:19]  Vitamin D (25OH) 28.1      [01-19-23 @ 11:19]  Lipid: chol 84, TG 81, HDL 22, LDL --      [10-01-22 @ 07:10]    HBsAg Nonreact      [01-25-23 @ 07:18]  HBcAb Nonreact      [01-25-23 @ 07:18]  HCV 0.15, Nonreact      [01-25-23 @ 07:18]    MPO-ANCA <5.0, interpretation: Negative      [02-02-23 @ 07:22]  PR3-ANCA <5.0, interpretation: Negative      [02-02-23 @ 07:22]  Free Light Chains: kappa 4.65, lambda 2.60, ratio = 1.79      [01-25 @ 07:18]    RADIOLOGY & ADDITIONAL STUDIES:

## 2023-02-09 NOTE — PROGRESS NOTE ADULT - SUBJECTIVE AND OBJECTIVE BOX
Plt count 10K this morning  No epistaxis or gingival bleeding    Vital Signs Last 24 Hrs  T(C): 36.7 (09 Feb 2023 08:28), Max: 37 (09 Feb 2023 04:20)  T(F): 98 (09 Feb 2023 08:28), Max: 98.6 (09 Feb 2023 04:20)  HR: 100 (09 Feb 2023 08:28) (87 - 100)  BP: 109/60 (09 Feb 2023 08:28) (109/60 - 136/82)  BP(mean): --  RR: 18 (09 Feb 2023 08:28) (18 - 19)  SpO2: 94% (09 Feb 2023 08:28) (94% - 95%)    I&O's Summary    02-08-23 @ 07:01  -  02-09-23 @ 07:00  --------------------------------------------------------  IN: 200 mL / OUT: 400 mL / NET: -200 mL        PHYSICAL EXAM:  GENERAL: NAD, well-developed, comfortable on room air  HEAD:  Atraumatic, Normocephalic  EYES: EOMI, PERRLA, conjunctiva and sclera clear  NECK: Supple, No JVD  CHEST/LUNG: mild decrease breath sounds bilaterally; No wheeze   HEART: Regular rate and rhythm; No murmurs, rubs, or gallops  ABDOMEN: Soft, Nontender, Nondistended; Bowel sounds present  Neuro: awake alert, back to baseline mental status. no focal weakness  EXTREMITIES:  2+ Peripheral Pulses, No clubbing, cyanosis, trace b/l edema  SKIN: superficial ecchymosis. no bleeding.     LABS:                        9.0    49.53 )-----------( 10       ( 09 Feb 2023 06:55 )             26.7     02-09    133<L>  |  99  |  15  ----------------------------<  73  3.7   |  22  |  0.72    Ca    8.7      09 Feb 2023 06:55  Phos  2.5     02-08  Mg     1.7     02-08    TPro  6.1  /  Alb  3.3  /  TBili  0.7  /  DBili  x   /  AST  17  /  ALT  6<L>  /  AlkPhos  71  02-09      CAPILLARY BLOOD GLUCOSE                RADIOLOGY & ADDITIONAL TESTS:    Imaging Personally Reviewed:  [x] YES  [ ] NO    Case discussed with NPP:  [X] YES  [ ] NO

## 2023-02-09 NOTE — PROGRESS NOTE ADULT - SUBJECTIVE AND OBJECTIVE BOX
[facility]  ANDREW Rodríguez Y. Patel, S. Shah, G. Casimir  [phone]  ([phone] - weekdays after 5pm and weekends)    Name: BETTIE NGUYEN  Age/Gender: 82y Female  MRN: [id number]    Interval History:  Patient seen and examined this morning.   States she feels fine, has no complaints.   Denies pain, dyspnea, fever, chills, n/v/d.   Notes reviewed. Afebrile.     Allergies: codeine (Nausea)  doxycycline (Nausea)  penicillin (Hives)    Objective:  Vitals:   T(F): 98.6 (02-09-23 @ 04:20), Max: 98.6 (02-09-23 @ 04:20)  HR: 89 (02-09-23 @ 04:20) (87 - 89)  BP: 136/82 (02-09-23 @ 04:20) (119/74 - 136/82)  RR: 18 (02-09-23 @ 04:20) (18 - 19)  SpO2: 94% (02-09-23 @ 04:20) (94% - 95%)  Physical Examination:  General: no acute distress, on RA  HEENT: NC/AT, anicteric, neck supple  Cardio: S1, S2 present, normal rate  Resp: decreased breath sounds b/l  Abd: soft, NT, ND, + BS  Neuro: AAOx3, no obvious focal deficits  Ext: no edema, no cyanosis, moves extremities  Skin: warm, dry, no visible rash, ecchymosis   L chest port without erythema/TTP    Laboratory Studies:  CBC:                       9.0    49.53 )-----------( 10       ( 09 Feb 2023 06:55 )             26.7     WBC Trend:  49.53 02-09-23 @ 06:55  45.24 02-08-23 @ 06:54  31.08 02-07-23 @ 07:01  28.03 02-06-23 @ 06:58  28.78 02-05-23 @ 07:33  33.58 02-04-23 @ 07:03  49.00 02-02-23 @ 16:52    CMP: 02-09    133<L>  |  99  |  15  ----------------------------<  73  3.7   |  22  |  0.72    Ca    8.7      09 Feb 2023 06:55  Phos  2.5     02-08  Mg     1.7     02-08    TPro  6.1  /  Alb  3.3  /  TBili  0.7  /  DBili  x   /  AST  17  /  ALT  6<L>  /  AlkPhos  71  02-09    Creatinine, Serum: 0.72 mg/dL (02-09-23 @ 06:55)  Creatinine, Serum: 0.64 mg/dL (02-08-23 @ 06:50)  Creatinine, Serum: 0.61 mg/dL (02-07-23 @ 07:01)  Creatinine, Serum: 0.60 mg/dL (02-06-23 @ 12:38)  Creatinine, Serum: 0.59 mg/dL (02-06-23 @ 06:55)  Creatinine, Serum: 0.68 mg/dL (02-05-23 @ 07:30)  Creatinine, Serum: 0.86 mg/dL (02-04-23 @ 07:03)  Creatinine, Serum: 0.87 mg/dL (02-02-23 @ 16:52)    LIVER FUNCTIONS - ( 09 Feb 2023 06:55 )  Alb: 3.3 g/dL / Pro: 6.1 g/dL / ALK PHOS: 71 U/L / ALT: 6 U/L / AST: 17 U/L / GGT: x           Vancomycin Level, Random: 17.9 ug/mL (02-07-23 @ 07:01)  Vancomycin Level, Trough: 17.4 ug/mL (02-06-23 @ 09:40)    Microbiology: reviewed   Culture - Urine (collected 02-01-23 @ 09:57)  Source: Clean Catch Clean Catch (Midstream)  Final Report (02-04-23 @ 07:54):    >100,000 CFU/ml Enterococcus faecium (vancomycin resistant)  Organism: Enterococcus faecium (vancomycin resistant) (02-04-23 @ 07:54)  Organism: Enterococcus faecium (vancomycin resistant) (02-04-23 @ 07:54)      -  Ampicillin: R >8 Predicts results to ampicillin/sulbactam, amoxacillin-clavulanate and  piperacillin-tazobactam.      -  Ciprofloxacin: R >2      -  Daptomycin: N/A >4      -  Levofloxacin: R >4      -  Linezolid: S 2      -  Nitrofurantoin: S <=32 Should not be used to treat pyelonephritis.      -  Tetracycline: R >8      -  Vancomycin: R >16      Method Type: JIM    Culture - Blood (collected 02-01-23 @ 09:35)  Source: .Blood Blood-Peripheral  Final Report (02-06-23 @ 13:01):    No Growth Final    Culture - Blood (collected 02-01-23 @ 09:20)  Source: .Blood Blood-Peripheral  Final Report (02-06-23 @ 13:01):    No Growth Final    Culture - Urine (collected 02-01-23 @ 02:04)  Source: Clean Catch Clean Catch (Midstream)  Final Report (02-03-23 @ 19:05):    >100,000 CFU/ml Enterococcus faecium (vancomycin resistant)  Organism: Enterococcus faecium (vancomycin resistant) (02-03-23 @ 19:05)  Organism: Enterococcus faecium (vancomycin resistant) (02-03-23 @ 19:05)      -  Ampicillin: R >8 Predicts results to ampicillin/sulbactam, amoxacillin-clavulanate and  piperacillin-tazobactam.      -  Ciprofloxacin: R >2      -  Daptomycin: N/A >4      -  Levofloxacin: R >4      -  Linezolid: S 2      -  Nitrofurantoin: S <=32 Should not be used to treat pyelonephritis.      -  Tetracycline: R >8      -  Vancomycin: R >16      Method Type: JIM    Culture - Blood (collected 01-31-23 @ 10:20)  Source: .Blood Blood-Peripheral  Gram Stain (02-03-23 @ 01:17):    Growth in aerobic bottle: Gram positive cocci in pairs    Growth in anaerobic bottle: Gram positive cocci in pairs  Final Report (02-06-23 @ 20:20):    Growth in aerobic bottle: Enterococcus faecalis    Growth in aerobic and anaerobic bottles: Staphylococcus epidermidis    ***Blood Panel PCR results on this specimen are available    approximately 3 hours after the Gram stain result.***    Gram stain, PCR,and/or culture results may not always    correspond due to difference in methodologies.    ************************************************************    This PCR assay was performed by multiplex PCR. This    Assay tests for 66 bacterial and resistance genetargets.    Please refer to the [facility] [employer] test directory    at [email or web address] for details.  Organism: Blood Culture PCR  Enterococcus faecalis (02-06-23 @ 20:20)  Organism: Enterococcus faecalis (02-06-23 @ 20:20)      -  Ampicillin: S <=2 Predicts results to ampicillin/sulbactam, amoxacillin-clavulanate and  piperacillin-tazobactam.      -  Gentamicin synergy: S <=500      -  Streptomycin synergy: S <=1000      -  Vancomycin: S 2      Method Type: JIM  Organism: Blood Culture PCR (02-06-23 @ 20:20)      -  Enterococcus faecalis: Detec      -  Staphylococcus epidermidis, Methicillin resistant: Detec      Method Type: PCR    Culture - Blood (collected 01-30-23 @ 12:01)  Source: .Blood Blood-Peripheral  Gram Stain (02-01-23 @ 22:58):    Growth in aerobic bottle: Gram Positive Cocci in Clusters    Growth in anaerobic bottle: Gram Positive Cocci in Clusters  Final Report (02-02-23 @ 10:29):    Growth in aerobic and anaerobic bottles: Staphylococcus epidermidis    ***Blood Panel PCR results on this specimen are available    approximately 3 hours after the Gram stain result.***    Gram stain, PCR, and/or culture results may not always    correspond due to difference in methodologies.    ************************************************************    This PCR assay was performed by multiplex PCR. This    Assay tests for 66 bacterial and resistance gene targets.    Please refer to the [facility] test directory    at [email or web address] for details.  Organism: Blood Culture PCR  Staphylococcus epidermidis (02-02-23 @ 10:29)  Organism: Staphylococcus epidermidis (02-02-23 @ 10:29)      -  Ampicillin/Sulbactam: R 16/8      -  Cefazolin: R >16      -  Clindamycin: R >4      -  Erythromycin: R >4      -  Gentamicin: S <=1 Should not be used as monotherapy      -  Oxacillin: R >2      -  Penicillin: R >8      -  Rifampin: S <=1 Should not be used as monotherapy      -  Tetracycline: S <=1      -  Trimethoprim/Sulfamethoxazole: R >2/38      -  Vancomycin: S 1      Method Type: JIM  Organism: Blood Culture PCR (02-02-23 @ 10:29)      -  Staphylococcus epidermidis, Methicillin resistant: Detec      Method Type: PCR    Radiology: reviewed     Medications:  acetaminophen     Tablet .. 650 milliGRAM(s) Oral every 6 hours PRN  aluminum hydroxide/magnesium hydroxide/simethicone Suspension 30 milliLiter(s) Oral every 4 hours PRN  benzocaine/menthol Lozenge 1 Lozenge Oral every 8 hours PRN  budesonide 160 MICROgram(s)/formoterol 4.5 MICROgram(s) Inhaler 2 Puff(s) Inhalation two times a day  chlorhexidine 2% Cloths 1 Application(s) Topical daily  cholecalciferol 2000 Unit(s) Oral daily  citalopram 10 milliGRAM(s) Oral daily  fluticasone propionate 50 MICROgram(s)/spray Nasal Spray 1 Spray(s) Both Nostrils two times a day  folic acid 1 milliGRAM(s) Oral daily  guaiFENesin  milliGRAM(s) Oral every 12 hours  influenza  Vaccine (HIGH DOSE) 0.7 milliLiter(s) IntraMuscular once  ipratropium    for Nebulization 500 MICROGram(s) Nebulizer every 6 hours  lactobacillus acidophilus 1 Tablet(s) Oral daily  melatonin 3 milliGRAM(s) Oral at bedtime PRN  metoprolol tartrate 25 milliGRAM(s) Oral two times a day  mirtazapine 7.5 milliGRAM(s) Oral daily  ondansetron Injectable 4 milliGRAM(s) IV Push every 8 hours PRN  pantoprazole    Tablet 40 milliGRAM(s) Oral before breakfast  polyethylene glycol 3350 17 Gram(s) Oral daily PRN  predniSONE   Tablet 20 milliGRAM(s) Oral daily  senna 1 Tablet(s) Oral daily  sodium chloride 0.65% Nasal 2 Spray(s) Both Nostrils every 6 hours  trimethoprim  160 mG/sulfamethoxazole 800 mG 1 Tablet(s) Oral daily  vancomycin  IVPB 1000 milliGRAM(s) IV Intermittent every 24 hours    Current Antimicrobials:  trimethoprim  160 mG/sulfamethoxazole 800 mG 1 Tablet(s) Oral daily  vancomycin  IVPB 1000 milliGRAM(s) IV Intermittent every 24 hours    Prior/Completed Antimicrobials:  cefepime   IVPB  nitrofurantoin monohydrate/macrocrystals (MACROBID)  vancomycin  IVPB

## 2023-02-09 NOTE — PROGRESS NOTE ADULT - ASSESSMENT
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 She presented here from [facility] for elevated WBC and low hemoglobin, severe weakness. Pt states for the past 2-3 days has been having increased weakness and lethargy, decreased appetite. +sputum productive cough. + cui, no sob, no difficulty breathing. No abdominal pain, nausea, vomiting, no bloody stools. Has endorsed multiple episodes of loose stools x weeks, currently being treated for c.diff with oral vancomycin.       Fatigue/dyspnea: likely due to severe anemia :  pt s/p 1 unit PRB  Diarrhea  Pulmonary vasculitis  :  hx of Tachycardia :  Recent TTE on 11/3/22 reviewed: normal LV. outpt card Dr. Ady Cooper  Anxiety and depression  GERD (gastroesophageal reflux disease)  Hyperlipidemia.   DVT ppx     1/20:  Fatigue/dyspnea: likely due to severe anemia :  pt s/p 1 unit PRBC: hb now  > 10  : she is on 2 L of oxygen : no wheezing: no overt signs of bleeding : ct chest is abnormal report noted:  has jean-claude ill defined opacities of unclear etiology  : venous ph is mild acidotic:  no need for Bipap at this time : monitor and trend hb  Diarrhea : symptomatic rx:  workup per primary team  Pulmonary vasculitis  : per rheumatology  : had recent vats surgery : LN biopsy noted:   hx of Tachycardia :  Recent TTE on 11/3/22 reviewed: normal LV. outpt card Dr. Ady Cooper  Anxiety and depression  GERD (gastroesophageal reflux disease) : ppi   Hyperlipidemia.   recent covid  : o n last admission she was treated for covid infection:   DVT ppx   d wteam    1/22:    Fatigue/dyspnea: likely due to severe anemia :  pt s/p 1 unit PRBC: hb now  > 10  : she is on 2 L of oxygen : no wheezing: no overt signs of bleeding : ct chest is abnormal report noted:  has jean-claude ill defined opacities of unclear etiology  : venous ph is mild acidotic:  no need for Bipap at this time : monitor and trend hb: she remained stable o gracie last 1 days:  she is not on any antibtiocs at this time: RVP  and blood cultures are negative: she had ebus biopsy also before: reviewed: ID following  Diarrhea : symptomatic rx:  workup per primary team : seems to have resolved  Pulmonary vasculitis  : per rheumatology  : had recent vats surgery : LN biopsy noted:   Bicytopneia and leucocytosis: ? etiology  : for possible bone marrow biopsy on Monday    hx of Tachycardia :  Recent TTE on 11/3/22 reviewed: normal LV. outpt card Dr. Ady Cooper  Anxiety and depression  GERD (gastroesophageal reflux disease) : ppi   Hyperlipidemia.   recent covid  : on last admission she was treated for covid infection:   DVT ppx   dw team    1/23:    Fatigue/dyspnea: likely due to severe anemia : feels weak  but no bleeding noted:  on 2 L of oxygen : she needs PT OT   Diarrhea :resolved:   Pulmonary vasculitis  : per rheumatology  : had recent vats surgery : LN biopsy noted: : she never received teat ment for vasculitis except low dose steroids:  she is awaiting bone marrow biopsy prior to induction therapy with rituximab: The ct chest done on this admission does not show pneumothorax and has jean-claude effusions:  There are some new opacites in rigth lower lobe which were not therre:  clinically she does not seem to have pneumonia: ID following: could it be a prt of vasculitis  Bicytopneia and leucocytosis: ? etiology  : for possible bone marrow biopsy today  hx of Tachycardia :  Recent TTE on 11/3/22 reviewed: normal LV. outpt card Dr. Ady Cooper  Anxiety and depression  GERD (gastroesophageal reflux disease) : ppi   Hyperlipidemia.   recent covid  : on last admission she was treated for covid infection:   DVT ppx   dw team    1/24:    Fatigue/dyspnea: likely due to severe anemia : feels weak  but no bleeding noted:  on 2 L of oxygen : she needs PT OT   Diarrhea :resolved:   Pulmonary vasculitis  : per rheumatology  : had recent vats surgery : LN biopsy noted: : she never received teat ment for vasculitis except low dose steroids:  she is awaiting bone marrow biopsy prior to induction therapy with rituximab: The ct chest done on this admission does not show pneumothorax and has jean-claude effusions:  There are some new opacites in rigth lower lobe which were not therre:  clinically she does not seem to have pneumonia: ID following: could it be a prt of vasculitis  Bicytopneia and leucocytosis: BM biopsy done: await results for eventual immunosuppressives therapy:   hx of Tachycardia :  Recent TTE on 11/3/22 reviewed: normal LV. outpt card Dr. Ady Cooper  Anxiety and depression  GERD (gastroesophageal reflux disease) : ppi   Hyperlipidemia.   recent covid  : on last admission she was treated for covid infection:   DVT ppx   dw team    1/25:    Fatigue/dyspnea: likely due to severe anemia : feels weak  but no bleeding noted:  on 2 L of oxygen : she needs PT OT   Diarrhea :resolved:   Pulmonary vasculitis  : per rheumatology  : had recent vats surgery : LN biopsy noted: : she never received teat ment for vasculitis except low dose steroids:  she is awaiting bone marrow biopsy prior to induction therapy with rituximab: The ct chest done on this admission does not show pneumothorax and has jean-claude effusions:  There are some new opacites in rigth lower lobe which were not there:  clinically she does not seem to have pneumonia: ID following: could it be a part of vasculitis : her resp status has not changed   Bicytopneia and leucocytosis: BM biopsy done: await results for still pending   hx of Tachycardia :  Recent TTE on 11/3/22 reviewed: normal LV. outpt card Dr. Ady Cooper  Anxiety and depression  GERD (gastroesophageal reflux disease) : ppi   Hyperlipidemia.   recent covid  : on last admission she was treated for covid infection:   DVT ppx   dw team: awaiting decision on immunosuppression     1/26;      Fatigue/dyspnea: likely due to severe anemia : feels weak  but no bleeding noted:  on 2 L of oxygen : she needs PT OT : got it yesterday    Diarrhea :resolved:   Pulmonary vasculitis  : per rheumatology  : had recent vats surgery : LN biopsy noted: : she never received teat ment for vasculitis except low dose steroids:  she is awaiting bone marrow biopsy prior to induction therapy with rituximab: The ct chest done on this admission does not show pneumothorax and has jean-claude effusions:  There are some new opacities in rigth lower lobe which were not there:  clinically she does not seem to have pneumonia: ID following: could it be a part of vasculitis : her resp status has not changed  : being observed off antibiotics   Bicytopneia and leucocytosis: BM biopsy done: await results for still pending   hx of Tachycardia :  Recent TTE on 11/3/22 reviewed: normal LV. outpt card Dr. Ady Cooper  Anxiety and depression  GERD (gastroesophageal reflux disease) : ppi   Hyperlipidemia.   recent covid  : on last admission she was treated for covid infection:   DVT ppx   dw team: awaiting decision on immunosuppression     1/27:    Fatigue/dyspnea: likely due to severe anemia : feels weak  but no bleeding noted:  on 2 L of oxygen : cont  PT OT :   Diarrhea :resolved:   Pulmonary vasculitis  : per rheumatology  : had recent vats surgery : LN biopsy noted: : she never received teat ment for vasculitis except low dose steroids:  she is awaiting bone marrow biopsy prior to induction therapy with rituximab: The ct chest done on this admission does not show pneumothorax and has jean-claude effusions:  There are some new opacities in rigth lower lobe which were not there:  clinically she does not seem to have pneumonia: ID following: could it be a part of vasculitis : her resp status has not changed  : being observed off antibiotics :  on bactrim prophylaxis as she is on chr steroids   Bicytopneia and leucocytosis: BM biopsy done: await results for still pending   hx of Tachycardia :  Recent TTE on 11/3/22 reviewed: normal LV. outpt card Dr. Ady Cooper  Anxiety and depression  GERD (gastroesophageal reflux disease) : ppi   Hyperlipidemia.   recent covid  : on last admission she was treated for covid infection:   DVT ppx   dw team: awaiting decision on immunosuppression     1/29:    Fatigue/dyspnea: likely due to severe anemia : feels weak  but no bleeding noted:  on 2 L of oxygen : cont  PT OT :   Diarrhea :resolved:   Pulmonary vasculitis  : per rheumatology  : had recent vats surgery : LN biopsy noted: : she never received treat ment for vasculitis except low dose steroids:  she is awaiting bone marrow biopsy prior to induction therapy with rituximab: The ct chest done on this admission does not show pneumothorax and has jean-claude effusions:  There are some new opacities in rigth lower lobe which were not there:  clinically she does not seem to have pneumonia: ID following: could it be a part of vasculitis : her resp status has not changed  : being observed off antibiotics :  on bactrim prophylaxis as she is on chr steroids   Bicytopneia and leucocytosis: BM biopsy: results reviewed defer to [facility]  hx of Tachycardia :  Recent TTE on 11/3/22 reviewed: normal LV.   Anxiety and depression  GERD (gastroesophageal reflux disease) : ppi   Hyperlipidemia.   recent covid  : on last admission she was treated for covid infection:   DVT ppx   dw team: awaiting decision on immunosuppression     1/30:    Fatigue/dyspnea: likely due to severe anemia : feels weak  but no bleeding noted:  on 2 L of oxygen : cont  PT OT : sitting in chair today   Diarrhea :resolved:   Pulmonary vasculitis  : per rheumatology  : had recent vats surgery : LN biopsy noted: : she never received treat ment for vasculitis except low dose steroids:  she is awaiting bone marrow biopsy prior to induction therapy with rituximab: The ct chest done on this admission does not show pneumothorax and has jean-claude effusions:  There are some new opacities in rigth lower lobe which were not there:  clinically she does not seem to have pneumonia: ID following: could it be a part of vasculitis : her resp status has not changed  : being observed off antibiotics :  on bactrim prophylaxis as she is on chr steroids  /l however she had low grade tempt today : rvp is negative : cultures sent: ID follow up   Bicytopneia and leucocytosis: BM biopsy: results reviewed defer to [facility]  hx of Tachycardia :  Recent TTE on 11/3/22 reviewed: normal LV.   Anxiety and depression  GERD (gastroesophageal reflux disease) : ppi   Hyperlipidemia.   recent covid  : on last admission she was treated for covid infection:   DVT ppx   dw team: awaiting decision on immunosuppression     1/31:    Fatigue/dyspnea: likely due to severe anemia : feels weak  but no bleeding noted:  on 2 L of oxygen : cont  PT OT : lying in bed:   Diarrhea :resolved:   Pulmonary vasculitis  : per rheumatology  : had recent vats surgery : LN biopsy noted: : she never received treat ment for vasculitis except low dose steroids:  she is awaiting bone marrow biopsy prior to induction therapy with rituximab: The ct chest done on this admission does not show pneumothorax and has jean-claude effusions:  There are some new opacities in rigth lower lobe which were not there:  clinically she does not seem to have pneumonia: ID following: could it be a part of vasculitis : her resp status has not changed  : being observed off antibiotics :  on bactrim prophylaxis as she is on chr steroids : she seems OK:  no sob:     Bicytopneia and leucocytosis: BM biopsy: results reviewed defer to [facility] ; for eventual chemo   hx of Tachycardia :  Recent TTE on 11/3/22 reviewed: normal LV.   Anxiety and depression  GERD (gastroesophageal reflux disease) : ppi   Hyperlipidemia.   recent covid  : on last admission she was treated for covid infection:   DVT ppx   dw team: awaiting decision on immunosuppression     2/1:    Fatigue/dyspnea: likely due to severe anemia : feels weak  but no bleeding noted:  on 2 L of oxygen : cont  PT OT : lying in bed:  her blood cultures are contaminant:   Diarrhea :resolved:   Pulmonary vasculitis  : per rheumatology  : had recent vats surgery : LN biopsy noted: : she never received treat ment for vasculitis except low dose steroids:  she is awaiting bone marrow biopsy prior to induction therapy with rituximab: The ct chest done on this admission does not show pneumothorax and has jean-claude effusions:  There are some new opacities in rigth lower lobe which were not there:  clinically she does not seem to have pneumonia: ID following: could it be a part of vasculitis : her resp status has not changed  : being observed off antibiotics :  on bactrim prophylaxis as she is on chr steroids : she seems OK:  no sob:     Bicytopneia and leucocytosis: BM biopsy: results reviewed defer to [facility] ; for eventual chemo   hx of Tachycardia :  Recent TTE on 11/3/22 reviewed: normal LV.   Anxiety and depression  GERD (gastroesophageal reflux disease) : ppi   Hyperlipidemia.   recent covid  : on last admission she was treated for covid infection:   DVT ppx   dw team: awaiting decision on immunosuppression: overall her general condition seems very poor and very weak:     2/2:    Fatigue/dyspnea: likely due to severe anemia : feels weak  but no bleeding noted:  on 2 L of oxygen : cont  PT OT : lying in bed:  her blood cultures are positive E FAECALIS :  ON MEROPENEM  Diarrhea :resolved:   Pulmonary vasculitis  : per rheumatology  : had recent vats surgery : LN biopsy noted: : she never received treat ment for vasculitis except low dose steroids:  she is awaiting bone marrow biopsy prior to induction therapy with rituximab: The ct chest done on this admission does not show pneumothorax and has ejan-claude effusions:  There are some new opacities in rigth lower lobe which were not there:  clinically she does not seem to have pneumonia: ID following: could it be a part of vasculitis : her resp status has not changed  : being observed off antibiotics :  on bactrim prophylaxis as she is on chr steroids : she seems OK:  no sob:     Bicytopneia and leucocytosis: BM biopsy: results reviewed defer to [facility] ; for eventual chemo   hx of Tachycardia :  Recent TTE on 11/3/22 reviewed: normal LV.   Anxiety and depression  GERD (gastroesophageal reflux disease) : ppi   Hyperlipidemia.   recent covid  : on last admission she was treated for covid infection:   DVT ppx   dw team: awaiting decision on immunosuppression: overall her general condition seems very poor and very weak: ON ANTIBIOTICS     2/3   Pulmonary Vasculitis  -Steroids per rheum  -Plan for eventual Rituxan  -Bactrim for PCP ppx  MDS  -Per heme/onc  Bacteremia  -E. Faecalis bacteremia  -ABX per ID  Epistaxis  -Per ENT  -Breathing comfortably on 40% humidified face tent, keep sats >90%    2/6:    2/6  Pulmonary Vasculitis  -Steroids per rheum  -Plan for eventual Rituxan /l she is on room air at this time  -Bactrim for PCP ppx  MDS  -Per heme/onc  Bacteremia  -E. Faecalis bacteremia  -ABX per ID  Epistaxis  -Per ENT  - she is on room air today  : cont to mantain o2 sao2 above 90% all the ti me:     acp      2/7:  Pulmonary Vasculitis  -Steroids per rheum  -Plan for eventual Rituxan /l she is on room air at this time  -Bactrim for PCP ppx  MDS  -Per heme/onc  Bacteremia  -E. Faecalis bacteremia  -ABX per ID : awaiting clearance from id for chemo : last blood cultures have been negative  Epistaxis  -Per ENT  - she is on room air today  : cont to mantain o2 sao2 above 90% all the ti me:     acp    2/8:  Pulmonary Vasculitis  -Steroids per rheum  -Plan for eventual Rituxan /l she is on room air at this time  -Bactrim for PCP ppx  MDS  -Per heme/onc  Bacteremia  -E. Faecalis bacteremia  -ABX per ID : awaiting clearance from id for chemo : last blood cultures have been negative : finishing antbiotics today  :  Epistaxis  -Per ENT  - she is on room air today  : cont to mantain o2 sao2 above 90% all the ti me:  Thrombocytopenia:  sign today  : 14k: no obvious bleeding: cont to monitorial : transfuse per [facility]     acp      2/9:    Pulmonary Vasculitis : Steroids per rheum : Plan for eventual Rituxan /l she is on room air at this time : Bactrim for PCP ppx : on room air: with no change in her resp status  MDS : s'p BM biopsy : has MDS : defer to hemonc  Bacteremia E. Faecalis bacteremia  -ABX per ID : awaiting clearance from id for chemo : last blood cultures have been negative : finished antibiotics   Epistaxis Per ENT : she is on room air today  : cont to mantain o2 sao2 above 90% all the ti me:  Thrombocytopenia:  sign today  : 10k: no obvious bleeding: cont to monitorial : transfuse per [facility] :? for plt transfusion  today      dw acp

## 2023-02-09 NOTE — PROGRESS NOTE ADULT - ATTENDING COMMENTS
PAMELA Mills is a 82 year old female with high grade myelodysplasia recently recovered from infection and now on treatment on 5 tower monitored bed. She has signed consent for treatment with decitabine chemotherapy.  WE will arrange care with our chemotherapy nursing staff. I would avoid use of rituximab as it will further reduce the lymphocyte count

## 2023-02-09 NOTE — PROGRESS NOTE ADULT - ASSESSMENT
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 She presented here from [facility] for elevated WBC and low hemoglobin, severe weakness. Pt states for the past 2-3 days has been having increased weakness and lethargy, decreased appetite. +sputum productive cough. + cui, no sob, no difficulty breathing. No abdominal pain, nausea, vomiting, no bloody stools. Has endorsed multiple episodes of loose stools x weeks, currently being treated for c.diff with oral vancomycin.     Fatigue/dyspnea: due to severe anemia  - 2' MDS  - transfuse to keep Hgb above 7.0  - no melena, no hematochezia. no BRBPR. occult stool neg.   - she tends to require IV Lasix intermittently post transfusion.  - doubt GI bleed   - Physical therapy. Out of bed to chair with assistance.     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  - s/p Vanco PO a few days (started in rehab)  - diarrhea completely resolved.   - monitor off PO vanco per ID  - now constipated. PRN bowel regimen started. +BM    Fever, resolved.   - RVP 1/30/23 (-)  - monitor blood cxs 1/30/23  - started empirically on cefepime 1/30/23, switched to merrem 1/31/23 --> 2/3/23  - enterrocus bacteremia, abx per ID. on IV Vanco, last day 2/10/23  - Macrobid x 5 days course added for VRE in urine, last day was 2/8/23  - blood cultures now negative     AMS  - unclear etiology, likely metabolic encephalopathy from ?Cefepime? received 3 doses, last dose 1/31/23  - CT head neg. sugars stable  - vanco IV added per ID.   - close monitoring   - monitor glucose (glucose 65 on BMP, but 95 on fingersticks)  - encourage PO intake   - mental status improving.     Pulmonary vasculitis   - Recent TTE on 11/3/22 reviewed: normal LV. outpt card Dr. Ady Cooper  - outpt pulm Mack Tcuker   - s/p thoracoscopic biopsy of mediastinal lymph node and Lung wedge resection 11/10/22  - recent pleural effusion s/p 650 cc U/S-guided rt thoracentesis 11/17/22  - exudative but no neutrophilic predominance and initial Gram stain w/o organisms  - cultures neg.   - path results favoring vasculitis: no evidence for lymphoma. Cytology also came back negative.   - comfortable on nasal canula, better post diuretic last admission.   - rheum and heme-onc following previously, will reconsult.   - last admission, she was not started on immunosuppressants such as cellcept given recent treatment for COVID19 at that time  - c/w prednisone 20 mg qdaily.   - RTX under consideration --> acute hepatitis panel (-) and quantiferon indeterminate  - ID started PCP ppx with Bactrim.     hx of Tachycardia    - cont low-dose metoprolol, 50 mg to 25 mg dose reduced in rehab.   - card consult (Dr. Hooker) in the past, if needed    Anxiety and depression  - stable, continue citalopram and Remeron.  - Pt denied SI/HI ideations, denied visual and auditory hallucinations.     GERD (gastroesophageal reflux disease)  - continue PPI    Hyperlipidemia.   - continue home ezetimibe. okay to hold if nonformulary     Hyponatremia 127 (hypovolemic?)  - renal on the case, resolved.   - s/p 3 days of IV lasix, now completed. electrolytes supplemented.  - O2 weaned off from ventimask to nascal canula to room air.     DVT ppx   - holding AC in the setting of anemia, pancytopenia.   - venodyne boots LEs

## 2023-02-09 NOTE — PROGRESS NOTE ADULT - ASSESSMENT
82 yr old female with a PMHx of diffuse large B cell lymphoma in remission (pt has a chemoport), depression, HLD, GERD, PE/DVT (4/2021 no longer on Eliquis), ANCA-vasculitis (20 y/a, no meds), s/p LVATS, LUIS wedge resection (2021), recent direct admit for RVATS, RUL Nodule Biopsy w/ IR marking in [facility] sent in from [facility] rehab for 2-3 days of lethargy and weakness with productive cough, found to have anemia, thrombocytopenia and leukocytosis. Hematology consulted for further work up.          # New diagnosis MDS with EB2    - CBC at admission with anemia of 5.9; platelet count of 68, wbc of 45.18. Review of records, patient with anemia since at least october 2022, however thrombocytopenia and leukocytosis is new.    - Received pRBC transfusion and responded appropriately, platelets downtrending ~30k   - Iron panel consistent AOCD; Ferritin elevated at 5768; B12 elevated, folate>20    - CT C/A/P with contrast without signs of LAD however, Multiple ill-defined opacities are noted within both lungs, more so in the right lower lobe. Exact etiology is unclear. B/L pleural effusions R>L   - RVP/covid negative,    - peripheral smear reviewed by hematology team during the initial consultation after this admission: no blasts, but immature WBCs noted, no schistocytes, thrombocytopenia noted    - Bone marrow biopsy 1/23/2023; Pathology consistent with MDS- EB 10-15% blasts .   - Hematology team discussed with her hematologist Dr. Madrigal at [facility]: plan is for single agent HMA for now; once follows up post rehab, can consider add on Dave. Rheumatology still considering rituximab; the initial Plan was to give Rituximab then HMA (azacytidine vs dacogen) once ID clears.   -hematology team communicated with Dr. Madrigal and recommended starting HMA Dacogen (decitabine) x 5 days; planning to start Day 1 on 2/10/23.  -Chemo consent signed by pt and put in the chart. hematology team called pt's son at [place]  [phone]  - bacteremia found on 1/31 blood culx (Staph epidermitis with methicillin resistance)  s/p meropenem and Vanco; now on nitrofurantion until 2/9   -left side Epistaxis found and ENT f/u, nostril pack removed 2/4 per note ;   transfusions for plts <10 if afebrile, <15 if febrile, 50 if bleeding, hbg <7: will receive 1u Plt today 2/9 in view of platelet 10     - Please check daily CBC with DIFF and CMP      # Hx of DLBCL EBV+   - Follows with Dr Madrigal, s/p R-mini-chop in 2021; Recent bone marrow biopsy for new anemia in Nov /2022 did not show any disease recurrence.    - repeat imaging this admission without signs of LAD     # ANCA vasculitis:    - Followed by rheum    - On chronic steroids - 20mg prednisone; on bactrim DS 1 tab daily for ppx        Note is not finalized until signed by attending   Sal Nye PGY5   hem & onc fellow p [phone] 82 yr old female with a PMHx of diffuse large B cell lymphoma in remission (pt has a chemoport), depression, HLD, GERD, PE/DVT (4/2021 no longer on Eliquis), ANCA-vasculitis (20 y/a, no meds), s/p LVATS, LUIS wedge resection (2021), recent direct admit for RVATS, RUL Nodule Biopsy w/ IR marking in [facility] sent in from [facility] rehab for 2-3 days of lethargy and weakness with productive cough, found to have anemia, thrombocytopenia and leukocytosis. Hematology consulted for further work up.          # New diagnosis MDS with EB2    - CBC at admission with anemia of 5.9; platelet count of 68, wbc of 45.18. Review of records, patient with anemia since at least october 2022, however thrombocytopenia and leukocytosis is new.    - Received pRBC transfusion and responded appropriately, platelets downtrending ~30k   - Iron panel consistent AOCD; Ferritin elevated at 5768; B12 elevated, folate>20    - CT C/A/P with contrast without signs of LAD however, Multiple ill-defined opacities are noted within both lungs, more so in the right lower lobe. Exact etiology is unclear. B/L pleural effusions R>L   - RVP/covid negative,    - peripheral smear reviewed by hematology team during the initial consultation after this admission: no blasts, but immature WBCs noted, no schistocytes, thrombocytopenia noted    - Bone marrow biopsy 1/23/2023; Pathology consistent with MDS- EB 10-15% blasts .   - Hematology team discussed with her hematologist Dr. Madrigal at [facility]: plan is for single agent HMA for now; once follows up post rehab, can consider add on Dave. Rheumatology still considering rituximab; the initial Plan was to give Rituximab then HMA (azacytidine vs dacogen) once ID clears.   -hematology team communicated with Dr. Madrigal and recommended starting HMA Dacogen (decitabine) x 5 days; planning to start Day 1 on 2/10/23.  -Chemo consent signed by pt and put in the chart. hematology team called pt's son at [place]  [phone] to discuss the situation, and her son understands and agrees with the treatment plan.   - bacteremia found on 1/31 blood culx (Staph epidermitis with methicillin resistance)  s/p meropenem and Vanco; now on nitrofurantion until 2/9   -left side Epistaxis found and ENT f/u, nostril pack removed 2/4 per note ; transfusions for plts <10 if afebrile, <15 if febrile, 50 if bleeding, hbg <7: will receive 1u Plt today 2/9 in view of platelet 10     - Please check daily CBC with DIFF and CMP      # Hx of DLBCL EBV+   - Follows with Dr Madrigal, s/p R-mini-chop in 2021; Recent bone marrow biopsy for new anemia in Nov /2022 did not show any disease recurrence.    - repeat imaging this admission without signs of LAD     # ANCA vasculitis:    - Followed by rheum    - On chronic steroids - 20mg prednisone; on bactrim DS 1 tab daily for ppx        Note is not finalized until signed by attending   Sal Nye PGY5   hem & onc fellow p [phone]

## 2023-02-09 NOTE — PROGRESS NOTE ADULT - ATTENDING COMMENTS
I personally interviewed and examined the patient, xrays reviewed. Agree with rheumatology fellow's note with my edits as above Chart reviewed. Agree with rheumatology fellow's note as above I agree with fellow's note as above Patient seen and examined. Agree with assessment and plan as above

## 2023-02-09 NOTE — PROGRESS NOTE ADULT - ASSESSMENT
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 She presented here from [facility] for elevated WBC and low hemoglobin, severe weakness. Pt states for the past 2-3 days has been having increased weakness and lethargy, decreased appetite. +sputum productive cough. + cui, no sob, no difficulty breathing. No abdominal pain, nausea, vomiting, no bloody stools. Has endorsed multiple episodes of loose stools x weeks, currently being treated for c.diff with oral vancomycin. Nephrology consulted for Hyponatremia.       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

## 2023-02-10 NOTE — PROGRESS NOTE ADULT - SUBJECTIVE AND OBJECTIVE BOX
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 She presented here from [facility] for elevated WBC and low hemoglobin, severe weakness. Pt states for the past 2-3 days has been having increased weakness and lethargy, decreased appetite. +sputum productive cough. + cui, no sob, no difficulty breathing. No abdominal pain, nausea, vomiting, no bloody stools. Has endorsed multiple episodes of loose stools x weeks, currently being treated for c.diff with oral vancomycin.  (18 Jan 2023 21:52)      PAST MEDICAL & SURGICAL HISTORY:  ANCA-associated vasculitis      Anxiety and depression      Pulmonary embolism  2021      DVT, lower extremity  2021      GERD (gastroesophageal reflux disease)      Hyperlipidemia      Lung mass  s/p left wedge resection      DLBCL (diffuse large B cell lymphoma)      History of appendectomy      Lung nodule  pt had wedge resection in 2021      Non-Hodgkins lymphoma  chemoport inserted in 2021          FAMILY HISTORY:  FH: lung cancer (Sibling)    FH: CAD (coronary artery disease) (Sibling)        MEDICATIONS  (STANDING):  budesonide 160 MICROgram(s)/formoterol 4.5 MICROgram(s) Inhaler 2 Puff(s) Inhalation two times a day  chlorhexidine 2% Cloths 1 Application(s) Topical daily  cholecalciferol 2000 Unit(s) Oral daily  citalopram 10 milliGRAM(s) Oral daily  fluticasone propionate 50 MICROgram(s)/spray Nasal Spray 1 Spray(s) Both Nostrils two times a day  folic acid 1 milliGRAM(s) Oral daily  guaiFENesin  milliGRAM(s) Oral every 12 hours  influenza  Vaccine (HIGH DOSE) 0.7 milliLiter(s) IntraMuscular once  ipratropium    for Nebulization 500 MICROGram(s) Nebulizer every 6 hours  lactobacillus acidophilus 1 Tablet(s) Oral daily  metoprolol tartrate 25 milliGRAM(s) Oral two times a day  mirtazapine 7.5 milliGRAM(s) Oral daily  pantoprazole    Tablet 40 milliGRAM(s) Oral before breakfast  predniSONE   Tablet 20 milliGRAM(s) Oral daily  senna 1 Tablet(s) Oral daily  sodium chloride 0.65% Nasal 2 Spray(s) Both Nostrils every 6 hours  trimethoprim  160 mG/sulfamethoxazole 800 mG 1 Tablet(s) Oral daily    MEDICATIONS  (PRN):  acetaminophen     Tablet .. 650 milliGRAM(s) Oral every 6 hours PRN Temp greater or equal to 38C (100.4F), Mild Pain (1 - 3)  aluminum hydroxide/magnesium hydroxide/simethicone Suspension 30 milliLiter(s) Oral every 4 hours PRN Dyspepsia  benzocaine/menthol Lozenge 1 Lozenge Oral every 8 hours PRN Sore Throat  melatonin 3 milliGRAM(s) Oral at bedtime PRN Insomnia  ondansetron Injectable 4 milliGRAM(s) IV Push every 8 hours PRN Nausea and/or Vomiting  polyethylene glycol 3350 17 Gram(s) Oral daily PRN Constipation      Allergies    codeine (Nausea)  doxycycline (Nausea)  penicillin (Hives)    Intolerances        SOCIAL HISTORY: No EtOH, no tobacco          T(F): 98.3 (02-10-23 @ 04:26), Max: 98.3 (02-09-23 @ 11:31)  HR: 93 (02-10-23 @ 04:26)  BP: 132/75 (02-10-23 @ 04:26)  RR: 18 (02-10-23 @ 04:26)  SpO2: 93% (02-10-23 @ 04:26)  Wt(kg): --    PHYSICAL EXAM:    Constitutional: NAD  Respiratory: CTA b/l, symmetric chest rise, with normal respiratory effort  Cardiovascular: RRR  Gastrointestinal: soft, NTND  Extremities:  no edema                            7.6    54.66 )-----------( 30       ( 10 Feb 2023 06:50 )             24.1       02-10    136  |  101  |  16  ----------------------------<  66<L>  3.3<L>   |  21<L>  |  0.73    Ca    8.9      10 Feb 2023 06:52    TPro  6.0  /  Alb  3.2<L>  /  TBili  0.7  /  DBili  x   /  AST  17  /  ALT  6<L>  /  AlkPhos  69  02-10

## 2023-02-10 NOTE — PROGRESS NOTE ADULT - SUBJECTIVE AND OBJECTIVE BOX
No overt bleeding overnight  Saturating mid-high 90s on RA    Vital Signs Last 24 Hrs  T(C): 36.8 (10 Feb 2023 04:26), Max: 36.8 (09 Feb 2023 11:31)  T(F): 98.3 (10 Feb 2023 04:26), Max: 98.3 (09 Feb 2023 11:31)  HR: 93 (10 Feb 2023 04:26) (86 - 93)  BP: 132/75 (10 Feb 2023 04:26) (114/79 - 138/76)  BP(mean): --  RR: 18 (10 Feb 2023 04:26) (18 - 18)  SpO2: 93% (10 Feb 2023 04:26) (92% - 96%)    I&O's Summary      PHYSICAL EXAM:  GENERAL: NAD, well-developed, comfortable on room air  HEAD:  Atraumatic, Normocephalic  EYES: EOMI, PERRLA, conjunctiva and sclera clear  NECK: Supple, No JVD  CHEST/LUNG: mild decrease breath sounds bilaterally; No wheeze   HEART: Regular rate and rhythm; No murmurs, rubs, or gallops  ABDOMEN: Soft, Nontender, Nondistended; Bowel sounds present  Neuro: awake alert, back to baseline mental status. no focal weakness  EXTREMITIES:  2+ Peripheral Pulses, No clubbing, cyanosis, trace b/l edema  SKIN: superficial ecchymosis. no bleeding.     LABS:                        7.6    54.66 )-----------( 30       ( 10 Feb 2023 06:50 )             24.1     02-10    136  |  101  |  16  ----------------------------<  66<L>  3.3<L>   |  21<L>  |  0.73    Ca    8.9      10 Feb 2023 06:52    TPro  6.0  /  Alb  3.2<L>  /  TBili  0.7  /  DBili  x   /  AST  17  /  ALT  6<L>  /  AlkPhos  69  02-10      CAPILLARY BLOOD GLUCOSE                RADIOLOGY & ADDITIONAL TESTS:    Imaging Personally Reviewed:  [x] YES  [ ] NO    Case discussed with NPP:  [X] YES  [ ] NO

## 2023-02-10 NOTE — PROGRESS NOTE ADULT - ATTENDING COMMENTS
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 No side effects noted post treatment

## 2023-02-10 NOTE — PROGRESS NOTE ADULT - SUBJECTIVE AND OBJECTIVE BOX
[facility]   MD RONEL FRIAS MD, PA KRISTINE SOLTANPOUR, DO INJUNG KO, NP    TEL:  OFFICE: [phone]    From 5pm-7am Answering Service [phone]    -- RENAL FOLLOW UP NOTE ---Date of Service 02-10-23 @ 13:19    Patient is a 82y old  Female who presents with a chief complaint of weakness (10 Feb 2023 10:45)      Patient seen and examined at bedside. No chest pain/sob    VITALS:  T(F): 98.2 (02-10-23 @ 11:40), Max: 98.3 (02-10-23 @ 04:26)  HR: 84 (02-10-23 @ 11:40)  BP: 105/59 (02-10-23 @ 11:40)  RR: 18 (02-10-23 @ 11:40)  SpO2: 98% (02-10-23 @ 11:40)  Wt(kg): --        PHYSICAL EXAM:  Constitutional: NAD  Neck: No JVD  Respiratory: CTAB, no wheezes, rales or rhonchi  Cardiovascular: S1, S2, RRR  Gastrointestinal: BS+, soft, NT/ND  Extremities: No peripheral edema    Hospital Medications:   MEDICATIONS  (STANDING):  budesonide 160 MICROgram(s)/formoterol 4.5 MICROgram(s) Inhaler 2 Puff(s) Inhalation two times a day  chlorhexidine 2% Cloths 1 Application(s) Topical daily  cholecalciferol 2000 Unit(s) Oral daily  citalopram 10 milliGRAM(s) Oral daily  decitabine IVPB (eMAR) 30 milliGRAM(s) IV Intermittent every 24 hours  fluticasone propionate 50 MICROgram(s)/spray Nasal Spray 1 Spray(s) Both Nostrils two times a day  folic acid 1 milliGRAM(s) Oral daily  guaiFENesin  milliGRAM(s) Oral every 12 hours  influenza  Vaccine (HIGH DOSE) 0.7 milliLiter(s) IntraMuscular once  ipratropium    for Nebulization 500 MICROGram(s) Nebulizer every 6 hours  lactobacillus acidophilus 1 Tablet(s) Oral daily  metoprolol tartrate 25 milliGRAM(s) Oral two times a day  mirtazapine 7.5 milliGRAM(s) Oral daily  ondansetron Injectable 8 milliGRAM(s) IV Push every 24 hours  pantoprazole    Tablet 40 milliGRAM(s) Oral before breakfast  potassium chloride   Powder 40 milliEquivalent(s) Oral once  predniSONE   Tablet 20 milliGRAM(s) Oral daily  senna 1 Tablet(s) Oral daily  sodium chloride 0.65% Nasal 2 Spray(s) Both Nostrils every 6 hours  trimethoprim  160 mG/sulfamethoxazole 800 mG 1 Tablet(s) Oral daily      LABS:  02-10    136  |  101  |  16  ----------------------------<  66<L>  3.3<L>   |  21<L>  |  0.73    Ca    8.9      10 Feb 2023 06:52    TPro  6.0  /  Alb  3.2<L>  /  TBili  0.7  /  DBili      /  AST  17  /  ALT  6<L>  /  AlkPhos  69  02-10    Creatinine Trend: 0.73 <--, 0.72 <--, 0.64 <--, 0.61 <--, 0.60 <--, 0.59 <--, 0.68 <--, 0.86 <--    Albumin, Serum: 3.2 g/dL (02-10 @ 06:52)                              7.6    54.66 )-----------( 30       ( 10 Feb 2023 06:50 )             24.1     Urine Studies:  Urinalysis - [02-01-23 @ 09:57]      Color Yellow / Appearance Slightly Turbid / SG 1.033 / pH 7.0      Gluc Negative / Ketone Trace  / Bili Negative / Urobili Negative       Blood Small / Protein 300 mg/dL / Leuk Est Negative / Nitrite Negative      RBC 4 /  / Hyaline 4 / Gran  / Sq Epi  / Non Sq Epi 10 / Bacteria Moderate    Urine Sodium 169      [02-07-23 @ 07:13]  Urine Osmolality 814      [02-07-23 @ 07:13]    Iron 152, TIBC 180, %sat 84      [01-19-23 @ 11:19]  Ferritin 5768      [01-19-23 @ 11:19]  Vitamin D (25OH) 28.1      [01-19-23 @ 11:19]  Lipid: chol 84, TG 81, HDL 22, LDL --      [10-01-22 @ 07:10]    HBsAg Nonreact      [01-25-23 @ 07:18]  HBcAb Nonreact      [01-25-23 @ 07:18]  HCV 0.15, Nonreact      [01-25-23 @ 07:18]    MPO-ANCA <5.0, interpretation: Negative      [02-02-23 @ 07:22]  PR3-ANCA <5.0, interpretation: Negative      [02-02-23 @ 07:22]  Free Light Chains: kappa 4.65, lambda 2.60, ratio = 1.79      [01-25 @ 07:18]    RADIOLOGY & ADDITIONAL STUDIES:

## 2023-02-10 NOTE — PROGRESS NOTE ADULT - ASSESSMENT
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 She presented here from [facility] for elevated WBC and low hemoglobin, severe weakness. Pt states for the past 2-3 days has been having increased weakness and lethargy, decreased appetite. +sputum productive cough. + cui, no sob, no difficulty breathing. No abdominal pain, nausea, vomiting, no bloody stools. Has endorsed multiple episodes of loose stools x weeks, currently being treated for c.diff with oral vancomycin.       Fatigue/dyspnea: likely due to severe anemia :  pt s/p 1 unit PRB  Diarrhea  Pulmonary vasculitis  :  hx of Tachycardia :  Recent TTE on 11/3/22 reviewed: normal LV. outpt card Dr. Ady Cooper  Anxiety and depression  GERD (gastroesophageal reflux disease)  Hyperlipidemia.   DVT ppx     1/20:  Fatigue/dyspnea: likely due to severe anemia :  pt s/p 1 unit PRBC: hb now  > 10  : she is on 2 L of oxygen : no wheezing: no overt signs of bleeding : ct chest is abnormal report noted:  has jean-claude ill defined opacities of unclear etiology  : venous ph is mild acidotic:  no need for Bipap at this time : monitor and trend hb  Diarrhea : symptomatic rx:  workup per primary team  Pulmonary vasculitis  : per rheumatology  : had recent vats surgery : LN biopsy noted:   hx of Tachycardia :  Recent TTE on 11/3/22 reviewed: normal LV. outpt card Dr. Ady Cooper  Anxiety and depression  GERD (gastroesophageal reflux disease) : ppi   Hyperlipidemia.   recent covid  : o n last admission she was treated for covid infection:   DVT ppx   d wteam    1/22:    Fatigue/dyspnea: likely due to severe anemia :  pt s/p 1 unit PRBC: hb now  > 10  : she is on 2 L of oxygen : no wheezing: no overt signs of bleeding : ct chest is abnormal report noted:  has jean-claude ill defined opacities of unclear etiology  : venous ph is mild acidotic:  no need for Bipap at this time : monitor and trend hb: she remained stable o gracie last 1 days:  she is not on any antibtiocs at this time: RVP  and blood cultures are negative: she had ebus biopsy also before: reviewed: ID following  Diarrhea : symptomatic rx:  workup per primary team : seems to have resolved  Pulmonary vasculitis  : per rheumatology  : had recent vats surgery : LN biopsy noted:   Bicytopneia and leucocytosis: ? etiology  : for possible bone marrow biopsy on Monday    hx of Tachycardia :  Recent TTE on 11/3/22 reviewed: normal LV. outpt card Dr. Ady Cooper  Anxiety and depression  GERD (gastroesophageal reflux disease) : ppi   Hyperlipidemia.   recent covid  : on last admission she was treated for covid infection:   DVT ppx   dw team    1/23:    Fatigue/dyspnea: likely due to severe anemia : feels weak  but no bleeding noted:  on 2 L of oxygen : she needs PT OT   Diarrhea :resolved:   Pulmonary vasculitis  : per rheumatology  : had recent vats surgery : LN biopsy noted: : she never received teat ment for vasculitis except low dose steroids:  she is awaiting bone marrow biopsy prior to induction therapy with rituximab: The ct chest done on this admission does not show pneumothorax and has jean-claude effusions:  There are some new opacites in rigth lower lobe which were not therre:  clinically she does not seem to have pneumonia: ID following: could it be a prt of vasculitis  Bicytopneia and leucocytosis: ? etiology  : for possible bone marrow biopsy today  hx of Tachycardia :  Recent TTE on 11/3/22 reviewed: normal LV. outpt card Dr. Ady Cooper  Anxiety and depression  GERD (gastroesophageal reflux disease) : ppi   Hyperlipidemia.   recent covid  : on last admission she was treated for covid infection:   DVT ppx   dw team    1/24:    Fatigue/dyspnea: likely due to severe anemia : feels weak  but no bleeding noted:  on 2 L of oxygen : she needs PT OT   Diarrhea :resolved:   Pulmonary vasculitis  : per rheumatology  : had recent vats surgery : LN biopsy noted: : she never received teat ment for vasculitis except low dose steroids:  she is awaiting bone marrow biopsy prior to induction therapy with rituximab: The ct chest done on this admission does not show pneumothorax and has jean-claude effusions:  There are some new opacites in rigth lower lobe which were not therre:  clinically she does not seem to have pneumonia: ID following: could it be a prt of vasculitis  Bicytopneia and leucocytosis: BM biopsy done: await results for eventual immunosuppressives therapy:   hx of Tachycardia :  Recent TTE on 11/3/22 reviewed: normal LV. outpt card Dr. Ady Cooper  Anxiety and depression  GERD (gastroesophageal reflux disease) : ppi   Hyperlipidemia.   recent covid  : on last admission she was treated for covid infection:   DVT ppx   dw team    1/25:    Fatigue/dyspnea: likely due to severe anemia : feels weak  but no bleeding noted:  on 2 L of oxygen : she needs PT OT   Diarrhea :resolved:   Pulmonary vasculitis  : per rheumatology  : had recent vats surgery : LN biopsy noted: : she never received teat ment for vasculitis except low dose steroids:  she is awaiting bone marrow biopsy prior to induction therapy with rituximab: The ct chest done on this admission does not show pneumothorax and has jean-claude effusions:  There are some new opacites in rigth lower lobe which were not there:  clinically she does not seem to have pneumonia: ID following: could it be a part of vasculitis : her resp status has not changed   Bicytopneia and leucocytosis: BM biopsy done: await results for still pending   hx of Tachycardia :  Recent TTE on 11/3/22 reviewed: normal LV. outpt card Dr. Ady Cooper  Anxiety and depression  GERD (gastroesophageal reflux disease) : ppi   Hyperlipidemia.   recent covid  : on last admission she was treated for covid infection:   DVT ppx   dw team: awaiting decision on immunosuppression     1/26;      Fatigue/dyspnea: likely due to severe anemia : feels weak  but no bleeding noted:  on 2 L of oxygen : she needs PT OT : got it yesterday    Diarrhea :resolved:   Pulmonary vasculitis  : per rheumatology  : had recent vats surgery : LN biopsy noted: : she never received teat ment for vasculitis except low dose steroids:  she is awaiting bone marrow biopsy prior to induction therapy with rituximab: The ct chest done on this admission does not show pneumothorax and has jean-claude effusions:  There are some new opacities in rigth lower lobe which were not there:  clinically she does not seem to have pneumonia: ID following: could it be a part of vasculitis : her resp status has not changed  : being observed off antibiotics   Bicytopneia and leucocytosis: BM biopsy done: await results for still pending   hx of Tachycardia :  Recent TTE on 11/3/22 reviewed: normal LV. outpt card Dr. Ady Cooper  Anxiety and depression  GERD (gastroesophageal reflux disease) : ppi   Hyperlipidemia.   recent covid  : on last admission she was treated for covid infection:   DVT ppx   dw team: awaiting decision on immunosuppression     1/27:    Fatigue/dyspnea: likely due to severe anemia : feels weak  but no bleeding noted:  on 2 L of oxygen : cont  PT OT :   Diarrhea :resolved:   Pulmonary vasculitis  : per rheumatology  : had recent vats surgery : LN biopsy noted: : she never received teat ment for vasculitis except low dose steroids:  she is awaiting bone marrow biopsy prior to induction therapy with rituximab: The ct chest done on this admission does not show pneumothorax and has jean-claude effusions:  There are some new opacities in rigth lower lobe which were not there:  clinically she does not seem to have pneumonia: ID following: could it be a part of vasculitis : her resp status has not changed  : being observed off antibiotics :  on bactrim prophylaxis as she is on chr steroids   Bicytopneia and leucocytosis: BM biopsy done: await results for still pending   hx of Tachycardia :  Recent TTE on 11/3/22 reviewed: normal LV. outpt card Dr. Ady Cooper  Anxiety and depression  GERD (gastroesophageal reflux disease) : ppi   Hyperlipidemia.   recent covid  : on last admission she was treated for covid infection:   DVT ppx   dw team: awaiting decision on immunosuppression     1/29:    Fatigue/dyspnea: likely due to severe anemia : feels weak  but no bleeding noted:  on 2 L of oxygen : cont  PT OT :   Diarrhea :resolved:   Pulmonary vasculitis  : per rheumatology  : had recent vats surgery : LN biopsy noted: : she never received treat ment for vasculitis except low dose steroids:  she is awaiting bone marrow biopsy prior to induction therapy with rituximab: The ct chest done on this admission does not show pneumothorax and has jean-claude effusions:  There are some new opacities in rigth lower lobe which were not there:  clinically she does not seem to have pneumonia: ID following: could it be a part of vasculitis : her resp status has not changed  : being observed off antibiotics :  on bactrim prophylaxis as she is on chr steroids   Bicytopneia and leucocytosis: BM biopsy: results reviewed defer to [facility]  hx of Tachycardia :  Recent TTE on 11/3/22 reviewed: normal LV.   Anxiety and depression  GERD (gastroesophageal reflux disease) : ppi   Hyperlipidemia.   recent covid  : on last admission she was treated for covid infection:   DVT ppx   dw team: awaiting decision on immunosuppression     1/30:    Fatigue/dyspnea: likely due to severe anemia : feels weak  but no bleeding noted:  on 2 L of oxygen : cont  PT OT : sitting in chair today   Diarrhea :resolved:   Pulmonary vasculitis  : per rheumatology  : had recent vats surgery : LN biopsy noted: : she never received treat ment for vasculitis except low dose steroids:  she is awaiting bone marrow biopsy prior to induction therapy with rituximab: The ct chest done on this admission does not show pneumothorax and has jean-claude effusions:  There are some new opacities in rigth lower lobe which were not there:  clinically she does not seem to have pneumonia: ID following: could it be a part of vasculitis : her resp status has not changed  : being observed off antibiotics :  on bactrim prophylaxis as she is on chr steroids  /l however she had low grade tempt today : rvp is negative : cultures sent: ID follow up   Bicytopneia and leucocytosis: BM biopsy: results reviewed defer to [facility]  hx of Tachycardia :  Recent TTE on 11/3/22 reviewed: normal LV.   Anxiety and depression  GERD (gastroesophageal reflux disease) : ppi   Hyperlipidemia.   recent covid  : on last admission she was treated for covid infection:   DVT ppx   dw team: awaiting decision on immunosuppression     1/31:    Fatigue/dyspnea: likely due to severe anemia : feels weak  but no bleeding noted:  on 2 L of oxygen : cont  PT OT : lying in bed:   Diarrhea :resolved:   Pulmonary vasculitis  : per rheumatology  : had recent vats surgery : LN biopsy noted: : she never received treat ment for vasculitis except low dose steroids:  she is awaiting bone marrow biopsy prior to induction therapy with rituximab: The ct chest done on this admission does not show pneumothorax and has jean-claude effusions:  There are some new opacities in rigth lower lobe which were not there:  clinically she does not seem to have pneumonia: ID following: could it be a part of vasculitis : her resp status has not changed  : being observed off antibiotics :  on bactrim prophylaxis as she is on chr steroids : she seems OK:  no sob:     Bicytopneia and leucocytosis: BM biopsy: results reviewed defer to [facility] ; for eventual chemo   hx of Tachycardia :  Recent TTE on 11/3/22 reviewed: normal LV.   Anxiety and depression  GERD (gastroesophageal reflux disease) : ppi   Hyperlipidemia.   recent covid  : on last admission she was treated for covid infection:   DVT ppx   dw team: awaiting decision on immunosuppression     2/1:    Fatigue/dyspnea: likely due to severe anemia : feels weak  but no bleeding noted:  on 2 L of oxygen : cont  PT OT : lying in bed:  her blood cultures are contaminant:   Diarrhea :resolved:   Pulmonary vasculitis  : per rheumatology  : had recent vats surgery : LN biopsy noted: : she never received treat ment for vasculitis except low dose steroids:  she is awaiting bone marrow biopsy prior to induction therapy with rituximab: The ct chest done on this admission does not show pneumothorax and has jean-claude effusions:  There are some new opacities in rigth lower lobe which were not there:  clinically she does not seem to have pneumonia: ID following: could it be a part of vasculitis : her resp status has not changed  : being observed off antibiotics :  on bactrim prophylaxis as she is on chr steroids : she seems OK:  no sob:     Bicytopneia and leucocytosis: BM biopsy: results reviewed defer to [facility] ; for eventual chemo   hx of Tachycardia :  Recent TTE on 11/3/22 reviewed: normal LV.   Anxiety and depression  GERD (gastroesophageal reflux disease) : ppi   Hyperlipidemia.   recent covid  : on last admission she was treated for covid infection:   DVT ppx   dw team: awaiting decision on immunosuppression: overall her general condition seems very poor and very weak:     2/2:    Fatigue/dyspnea: likely due to severe anemia : feels weak  but no bleeding noted:  on 2 L of oxygen : cont  PT OT : lying in bed:  her blood cultures are positive E FAECALIS :  ON MEROPENEM  Diarrhea :resolved:   Pulmonary vasculitis  : per rheumatology  : had recent vats surgery : LN biopsy noted: : she never received treat ment for vasculitis except low dose steroids:  she is awaiting bone marrow biopsy prior to induction therapy with rituximab: The ct chest done on this admission does not show pneumothorax and has jean-claude effusions:  There are some new opacities in rigth lower lobe which were not there:  clinically she does not seem to have pneumonia: ID following: could it be a part of vasculitis : her resp status has not changed  : being observed off antibiotics :  on bactrim prophylaxis as she is on chr steroids : she seems OK:  no sob:     Bicytopneia and leucocytosis: BM biopsy: results reviewed defer to [facility] ; for eventual chemo   hx of Tachycardia :  Recent TTE on 11/3/22 reviewed: normal LV.   Anxiety and depression  GERD (gastroesophageal reflux disease) : ppi   Hyperlipidemia.   recent covid  : on last admission she was treated for covid infection:   DVT ppx   dw team: awaiting decision on immunosuppression: overall her general condition seems very poor and very weak: ON ANTIBIOTICS     2/3   Pulmonary Vasculitis  -Steroids per rheum  -Plan for eventual Rituxan  -Bactrim for PCP ppx  MDS  -Per heme/onc  Bacteremia  -E. Faecalis bacteremia  -ABX per ID  Epistaxis  -Per ENT  -Breathing comfortably on 40% humidified face tent, keep sats >90%    2/6:    2/6  Pulmonary Vasculitis  -Steroids per rheum  -Plan for eventual Rituxan /l she is on room air at this time  -Bactrim for PCP ppx  MDS  -Per heme/onc  Bacteremia  -E. Faecalis bacteremia  -ABX per ID  Epistaxis  -Per ENT  - she is on room air today  : cont to mantain o2 sao2 above 90% all the ti me:     acp      2/7:  Pulmonary Vasculitis  -Steroids per rheum  -Plan for eventual Rituxan /l she is on room air at this time  -Bactrim for PCP ppx  MDS  -Per heme/onc  Bacteremia  -E. Faecalis bacteremia  -ABX per ID : awaiting clearance from id for chemo : last blood cultures have been negative  Epistaxis  -Per ENT  - she is on room air today  : cont to mantain o2 sao2 above 90% all the ti me:     acp    2/8:  Pulmonary Vasculitis  -Steroids per rheum  -Plan for eventual Rituxan /l she is on room air at this time  -Bactrim for PCP ppx  MDS  -Per heme/onc  Bacteremia  -E. Faecalis bacteremia  -ABX per ID : awaiting clearance from id for chemo : last blood cultures have been negative : finishing antbiotics today  :  Epistaxis  -Per ENT  - she is on room air today  : cont to mantain o2 sao2 above 90% all the ti me:  Thrombocytopenia:  sign today  : 14k: no obvious bleeding: cont to monitorial : transfuse per [facility]mo[facility]     acp      2/9:    Pulmonary Vasculitis : Steroids per rheum : Plan for eventual Rituxan /l she is on room air at this time : Bactrim for PCP ppx : on room air: with no change in her resp status  MDS : s'p BM biopsy : has MDS : defer to hemonc  Bacteremia E. Faecalis bacteremia  -ABX per ID : awaiting clearance from id for chemo : last blood cultures have been negative : finished antibiotics   Epistaxis Per ENT : she is on room air today  : cont to mantain o2 sao2 above 90% all the ti me:  Thrombocytopenia:  sign today  : 10k: no obvious bleeding: cont to monitorial : transfuse per [facility] :? for plt transfusion  today      demi [facility]    2/10:  Pulmonary Vasculitis : Steroids per rheum : Plan for eventual Rituxan /l she is on room air at this time : Bactrim for PCP ppx : on room air: with no change in her resp status : now plan for rituximab aftger 5 days of dacogen   MDS : s'p BM biopsy : has MDS : defer to hemonc  Bacteremia E. Faecalis bacteremia  -ABX per ID : awaiting clearance from id for chemo : last blood cultures have been negative : finished antibiotics   Epistaxis Per ENT : she is on room air today  : cont to mantain o2 sao2 above 90% all the ti me:  Thrombocytopenia:  still low, but much better,  no obvious bleeding: cont to monitorial :  demi acp

## 2023-02-10 NOTE — PROGRESS NOTE ADULT - ASSESSMENT
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 Reports chronic cough, currently nonproductive - RVP/COVID negative. Has multiple episodes of loose stools x weeks, reported recently trying marijuana for appetite and noted worsening. She was given oral vancomycin empirically for C.diff but then became constipated, s/p bowel regimen and having normal bowel movements. Patient initially being monitored off antibiotics given she was afebrile, WBC was stable and no infectious source was found. She had a fever 100.9F on 1/30 overnight with worsening leukocytosis and then 101.4F overnight 1/31 and noted with confusion and found to have sepsis due to GP bacteremia.   H/o PCN allergy with hives    Sepsis due to gram positive bacteremia    E. faecalis bacteremia - unclear source, ?GI, less likely    Staph epi bacteremia ?skin vs port source vs contamination  AMS - neurotoxicity d/t cefepime vs infectious etiology   --d/c cefepime 1/31, mental status improved   - sepsis resolved - afebrile, WBC trend noted, Bcx cleared   - L chest port without sign of infection  - 2/1 CXR with bibasilar hazy opacification - may be atelectasis or pneumonia   - 1/30 Bcx (1 set sent) with Staph epi - MRSE -- sensitivities noted   - 1/31 Bcx (1 set sent) - aerobic bottle grew E. faecalis (amp and vanc sensitive) and MRSE  - 2/1 repeat Bcx -- Negative x2   - TTE w/o mention of vegetations  - vancomycin level supratherapeutic this am -- discontinued vancomycin-completed 10d course 2/10  - monitor temps/CBC    UTI with E. 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  H/o DLBCL, EBV+   - s/p BMBx 1/23 - found with new MDS   - quantiferon indeterminate - pt on steroids which can cause indeterminate results    - CT - s/p wedge resections in b/l upper lobes, 2 cm nodular opacity a/w staple line in RUL; multiple ill-defined b/l opacities more in RLL -unclear etiology, b/l effusions R>L   - continue on Bactrim DS 1 tablet daily for PCP ppx while on prednisone   - Rheum and Heme/Onc following - noted plan to start on chemotherapy today with decitabine x5d     L epistaxis   - ENT following, packing removed    Over the weekend Dr. Victor Manuel Delgado will be covering for our group. If you have any questions, concerns or new micro data, please reach out to them at [phone].    Yevgeniy Malave M.D.  \[facility]\"", Division of Infectious Diseases  [phone]  After 5pm on weekdays and all day on weekends - please call [phone]

## 2023-02-10 NOTE — PROGRESS NOTE ADULT - SUBJECTIVE AND OBJECTIVE BOX
Date of Service: 02-10-23 @ 16:16    Patient is a 82y old  Female who presents with a chief complaint of weakness (10 Feb 2023 13:19)      Any change in ROS:  doing same:  no sob:  on room air:     MEDICATIONS  (STANDING):  budesonide 160 MICROgram(s)/formoterol 4.5 MICROgram(s) Inhaler 2 Puff(s) Inhalation two times a day  chlorhexidine 2% Cloths 1 Application(s) Topical daily  cholecalciferol 2000 Unit(s) Oral daily  citalopram 10 milliGRAM(s) Oral daily  decitabine IVPB (eMAR) 30 milliGRAM(s) IV Intermittent every 24 hours  fluticasone propionate 50 MICROgram(s)/spray Nasal Spray 1 Spray(s) Both Nostrils two times a day  folic acid 1 milliGRAM(s) Oral daily  guaiFENesin  milliGRAM(s) Oral every 12 hours  influenza  Vaccine (HIGH DOSE) 0.7 milliLiter(s) IntraMuscular once  ipratropium    for Nebulization 500 MICROGram(s) Nebulizer every 6 hours  lactobacillus acidophilus 1 Tablet(s) Oral daily  metoprolol tartrate 25 milliGRAM(s) Oral two times a day  mirtazapine 7.5 milliGRAM(s) Oral daily  ondansetron Injectable 8 milliGRAM(s) IV Push every 24 hours  pantoprazole    Tablet 40 milliGRAM(s) Oral before breakfast  potassium chloride   Powder 40 milliEquivalent(s) Oral once  predniSONE   Tablet 20 milliGRAM(s) Oral daily  senna 1 Tablet(s) Oral daily  sodium chloride 0.65% Nasal 2 Spray(s) Both Nostrils every 6 hours  trimethoprim  160 mG/sulfamethoxazole 800 mG 1 Tablet(s) Oral daily    MEDICATIONS  (PRN):  acetaminophen     Tablet .. 650 milliGRAM(s) Oral every 6 hours PRN Temp greater or equal to 38C (100.4F), Mild Pain (1 - 3)  aluminum hydroxide/magnesium hydroxide/simethicone Suspension 30 milliLiter(s) Oral every 4 hours PRN Dyspepsia  benzocaine/menthol Lozenge 1 Lozenge Oral every 8 hours PRN Sore Throat  melatonin 3 milliGRAM(s) Oral at bedtime PRN Insomnia  ondansetron Injectable 4 milliGRAM(s) IV Push every 8 hours PRN Nausea and/or Vomiting  polyethylene glycol 3350 17 Gram(s) Oral daily PRN Constipation    Vital Signs Last 24 Hrs  T(C): 36.8 (10 Feb 2023 11:40), Max: 36.8 (10 Feb 2023 04:26)  T(F): 98.2 (10 Feb 2023 11:40), Max: 98.3 (10 Feb 2023 04:26)  HR: 84 (10 Feb 2023 11:40) (84 - 93)  BP: 105/59 (10 Feb 2023 11:40) (105/59 - 138/76)  BP(mean): --  RR: 18 (10 Feb 2023 11:40) (18 - 18)  SpO2: 98% (10 Feb 2023 11:40) (93% - 98%)    Parameters below as of 10 Feb 2023 11:40  Patient On (Oxygen Delivery Method): room air        I&O's Summary    10 Feb 2023 07:01  -  10 Feb 2023 16:16  --------------------------------------------------------  IN: 56 mL / OUT: 0 mL / NET: 56 mL          Physical Exam:   GENERAL: cacehctic  HEENT: RANJIT/   Atraumatic, Normocephalic  ENMT: No tonsillar erythema, exudates, or enlargement; Moist mucous membranes, Good dentition, No lesions  NECK: Supple, No JVD, Normal thyroid  CHEST/LUNG: Clear to auscultaion, ; No rales, rhonchi, wheezing, or rubs  CVS: Regular rate and rhythm; No murmurs, rubs, or gallops  GI: : Soft, Nontender, Nondistended; Bowel sounds present  NERVOUS SYSTEM:  Alert & Oriented X3  EXTREMITIE- edema  LYMPH: No lymphadenopathy noted  SKIN: No rashes or lesions  ENDOCRINOLOGY: No Thyromegaly  PSYCH: Appropriate    Labs:                              7.6    54.66 )-----------( 30       ( 10 Feb 2023 06:50 )             24.1                         9.0    49.53 )-----------( 10       ( 09 Feb 2023 06:55 )             26.7                         9.4    45.24 )-----------( 14       ( 08 Feb 2023 06:54 )             28.9                         8.9    31.08 )-----------( 32       ( 07 Feb 2023 07:01 )             26.4     02-10    136  |  101  |  16  ----------------------------<  66<L>  3.3<L>   |  21<L>  |  0.73  02-09    133<L>  |  99  |  15  ----------------------------<  73  3.7   |  22  |  0.72  02-08    138  |  102  |  14  ----------------------------<  77  3.3<L>   |  25  |  0.64  02-07    137  |  100  |  17  ----------------------------<  67<L>  3.2<L>   |  27  |  0.61    Ca    8.9      10 Feb 2023 06:52  Ca    8.7      09 Feb 2023 06:55    TPro  6.0  /  Alb  3.2<L>  /  TBili  0.7  /  DBili  x   /  AST  17  /  ALT  6<L>  /  AlkPhos  69  02-10  TPro  6.1  /  Alb  3.3  /  TBili  0.7  /  DBili  x   /  AST  17  /  ALT  6<L>  /  AlkPhos  71  02-09  TPro  6.0  /  Alb  3.4  /  TBili  0.7  /  DBili  x   /  AST  19  /  ALT  7<L>  /  AlkPhos  63  02-07    CAPILLARY BLOOD GLUCOSE          LIVER FUNCTIONS - ( 10 Feb 2023 06:52 )  Alb: 3.2 g/dL / Pro: 6.0 g/dL / ALK PHOS: 69 U/L / ALT: 6 U/L / AST: 17 U/L / GGT: x                     RECENT CULTURES:        RESPIRATORY CULTURES:          Studies  Chest X-RAY  CT SCAN Chest   Venous Dopplers: LE:   CT Abdomen  Others

## 2023-02-10 NOTE — PROGRESS NOTE ADULT - ASSESSMENT
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 She presented here from [facility] for elevated WBC and low hemoglobin, severe weakness. Pt states for the past 2-3 days has been having increased weakness and lethargy, decreased appetite. +sputum productive cough. + cui, no sob, no difficulty breathing. No abdominal pain, nausea, vomiting, no bloody stools. Has endorsed multiple episodes of loose stools x weeks, currently being treated for c.diff with oral vancomycin. Nephrology consulted for Hyponatremia.       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

## 2023-02-10 NOTE — PROGRESS NOTE ADULT - ASSESSMENT
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 She presented here from [facility] for elevated WBC and low hemoglobin, severe weakness. Pt states for the past 2-3 days has been having increased weakness and lethargy, decreased appetite. +sputum productive cough. + cui, no sob, no difficulty breathing. No abdominal pain, nausea, vomiting, no bloody stools. Has endorsed multiple episodes of loose stools x weeks, currently being treated for c.diff with oral vancomycin.     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 no BRBPR. occult stool neg.   - she tends to require IV Lasix intermittently post transfusion.  - doubt GI bleed   - Physical therapy. Out of bed to chair with assistance.     Diarrhea, unspecified  - elevated WBC  - no documented c.diff PCR, re-ordered.  - s/p Vanco PO a few days (started in rehab)  - diarrhea completely resolved.   - monitor off PO vanco per ID  - now constipated. PRN bowel regimen started. +BM    Fever, resolved.   - RVP 1/30/23 (-)  - monitor blood cxs 1/30/23  - started empirically on cefepime 1/30/23, switched to merrem 1/31/23 --> 2/3/23  - enterrocus bacteremia, abx per ID. on IV Vanco, last day 2/10/23  - Macrobid x 5 days course added for VRE in urine, last day was 2/8/23  - blood cultures now negative     AMS  - unclear etiology, likely metabolic encephalopathy from ?Cefepime? received 3 doses, last dose 1/31/23  - CT head neg. sugars stable  - vanco IV added per ID.   - close monitoring   - monitor glucose (glucose 65 on BMP, but 95 on fingersticks)  - encourage PO intake   - mental status improved    Pulmonary vasculitis   - Recent TTE on 11/3/22 reviewed: normal LV. outpt card Dr. Ady Cooper  - outpt pulm Mack Tucker   - s/p thoracoscopic biopsy of mediastinal lymph node and Lung wedge resection 11/10/22  - recent pleural effusion s/p 650 cc U/S-guided rt thoracentesis 11/17/22  - exudative but no neutrophilic predominance and initial Gram stain w/o organisms  - cultures neg.   - path results favoring vasculitis: no evidence for lymphoma. Cytology also came back negative.   - comfortable on nasal canula, better post diuretic last admission.   - rheum and heme-onc following previously, will reconsult.   - last admission, she was not started on immunosuppressants such as cellcept given recent treatment for COVID19 at that time  - c/w prednisone 20 mg qdaily.   - RTX under consideration after administration of dacogen --> acute hepatitis panel (-) and quantiferon indeterminate  - ID started PCP ppx with Bactrim.     hx of Tachycardia    - cont low-dose metoprolol, 50 mg to 25 mg dose reduced in rehab.   - card consult (Dr. Hooker) in the past, if needed    Anxiety and depression  - stable, continue citalopram and Remeron.  - Pt denied SI/HI ideations, denied visual and auditory hallucinations.     GERD (gastroesophageal reflux disease)  - continue PPI    Hyperlipidemia.   - continue home ezetimibe. okay to hold if nonformulary     Hyponatremia 127 (hypovolemic?)  - renal on the case, resolved.   - s/p 3 days of IV lasix, now completed. electrolytes supplemented.  - O2 weaned off from ventimask to nascal canula to room air.     DVT ppx   - holding AC in the setting of anemia, pancytopenia.   - venodyne boots LEs

## 2023-02-10 NOTE — ADVANCED PRACTICE NURSE CONSULT - ASSESSMENT
Pt with day 1/5 tx cycle 1, labs noted in sunrise, height, weight and bsa verified, okay to proceed with tx as per MD Wright/MD Mckeon.  Pt with left chest wall port + blood return noted, no pain, redness or swelling noted at site.  Pt premedicated as noted in sunrise.  Pt administered Decitabine 20 mg/m2 = 30 mg iv over 1 hour via locked pump.  Pt educated on chemo regimen and signs and symptoms to report to area rn and staff, pt verbalized understanding.  Emotional support provided.  Report given to area rn.

## 2023-02-10 NOTE — PROGRESS NOTE ADULT - SUBJECTIVE AND OBJECTIVE BOX
[facility]  ANDREW Rodríguez Y. Patel, S. Shah, G. Casimir  [phone]  ([phone] - weekdays after 5pm and weekends)    Name: BETTIE NGUYEN  Age/Gender: 82y Female  MRN: [id number]    Interval History:  Patient seen and examined this morning.   States she feels fine, has no new complaints.   Notes reviewed. Afebrile.    Allergies: codeine (Nausea)  doxycycline (Nausea)  penicillin (Hives)    Objective:  Vitals:   T(F): 98.3 (02-10-23 @ 04:26), Max: 98.3 (02-09-23 @ 11:31)  HR: 93 (02-10-23 @ 04:26) (86 - 93)  BP: 132/75 (02-10-23 @ 04:26) (114/79 - 138/76)  RR: 18 (02-10-23 @ 04:26) (18 - 18)  SpO2: 93% (02-10-23 @ 04:26) (92% - 96%)  Physical Examination:  General: no acute distress, on RA  HEENT: NC/AT, EOMI, anicteric, neck supple  Cardio: S1, S2 present, normal rate  Resp: decreased breath sounds b/l  Abd: soft, NT, ND, + BS  Neuro: AAOx3, no obvious focal deficits  Ext: no edema, no cyanosis, moving extremities  Skin: warm, dry, no visible rash, ecchymosis   Psych: appropriate affect and mood for situation  Lines: L chest port without erythema/TTP    Laboratory Studies:  CBC:                       7.6    54.66 )-----------( 30       ( 10 Feb 2023 06:50 )             24.1     WBC Trend:  54.66 02-10-23 @ 06:50  49.53 02-09-23 @ 06:55  45.24 02-08-23 @ 06:54  31.08 02-07-23 @ 07:01  28.03 02-06-23 @ 06:58  28.78 02-05-23 @ 07:33  33.58 02-04-23 @ 07:03    CMP: 02-10    136  |  101  |  16  ----------------------------<  66<L>  3.3<L>   |  21<L>  |  0.73    Ca    8.9      10 Feb 2023 06:52    TPro  6.0  /  Alb  3.2<L>  /  TBili  0.7  /  DBili  x   /  AST  17  /  ALT  6<L>  /  AlkPhos  69  02-10    Creatinine, Serum: 0.73 mg/dL (02-10-23 @ 06:52)  Creatinine, Serum: 0.72 mg/dL (02-09-23 @ 06:55)  Creatinine, Serum: 0.64 mg/dL (02-08-23 @ 06:50)  Creatinine, Serum: 0.61 mg/dL (02-07-23 @ 07:01)  Creatinine, Serum: 0.60 mg/dL (02-06-23 @ 12:38)  Creatinine, Serum: 0.59 mg/dL (02-06-23 @ 06:55)  Creatinine, Serum: 0.68 mg/dL (02-05-23 @ 07:30)  Creatinine, Serum: 0.86 mg/dL (02-04-23 @ 07:03)      LIVER FUNCTIONS - ( 10 Feb 2023 06:52 )  Alb: 3.2 g/dL / Pro: 6.0 g/dL / ALK PHOS: 69 U/L / ALT: 6 U/L / AST: 17 U/L / GGT: x           Vancomycin Level, Random: 29.2 ug/mL (02-10-23 @ 06:54)  Vancomycin Level, Random: 17.9 ug/mL (02-07-23 @ 07:01)  Vancomycin Level, Trough: 17.4 ug/mL (02-06-23 @ 09:40)    Microbiology: reviewed   Culture - Urine (collected 02-01-23 @ 09:57)  Source: Clean Catch Clean Catch (Midstream)  Final Report (02-04-23 @ 07:54):    >100,000 CFU/ml Enterococcus faecium (vancomycin resistant)  Organism: Enterococcus faecium (vancomycin resistant) (02-04-23 @ 07:54)  Organism: Enterococcus faecium (vancomycin resistant) (02-04-23 @ 07:54)      -  Ampicillin: R >8 Predicts results to ampicillin/sulbactam, amoxacillin-clavulanate and  piperacillin-tazobactam.      -  Ciprofloxacin: R >2      -  Daptomycin: N/A >4      -  Levofloxacin: R >4      -  Linezolid: S 2      -  Nitrofurantoin: S <=32 Should not be used to treat pyelonephritis.      -  Tetracycline: R >8      -  Vancomycin: R >16      Method Type: JIM    Culture - Blood (collected 02-01-23 @ 09:35)  Source: .Blood Blood-Peripheral  Final Report (02-06-23 @ 13:01):    No Growth Final    Culture - Blood (collected 02-01-23 @ 09:20)  Source: .Blood Blood-Peripheral  Final Report (02-06-23 @ 13:01):    No Growth Final    Culture - Urine (collected 02-01-23 @ 02:04)  Source: Clean Catch Clean Catch (Midstream)  Final Report (02-03-23 @ 19:05):    >100,000 CFU/ml Enterococcus faecium (vancomycin resistant)  Organism: Enterococcus faecium (vancomycin resistant) (02-03-23 @ 19:05)  Organism: Enterococcus faecium (vancomycin resistant) (02-03-23 @ 19:05)      -  Ampicillin: R >8 Predicts results to ampicillin/sulbactam, amoxacillin-clavulanate and  piperacillin-tazobactam.      -  Ciprofloxacin: R >2      -  Daptomycin: N/A >4      -  Levofloxacin: R >4      -  Linezolid: S 2      -  Nitrofurantoin: S <=32 Should not be used to treat pyelonephritis.      -  Tetracycline: R >8      -  Vancomycin: R >16      Method Type: JIM    Culture - Blood (collected 01-31-23 @ 10:20)  Source: .Blood Blood-Peripheral  Gram Stain (02-03-23 @ 01:17):    Growth in aerobic bottle: Gram positive cocci in pairs    Growth in anaerobic bottle: Gram positive cocci in pairs  Final Report (02-06-23 @ 20:20):    Growth in aerobic bottle: Enterococcus faecalis    Growth in aerobic and anaerobic bottles: Staphylococcus epidermidis    ***Blood Panel PCR results on this specimen are available    approximately 3 hours after the Gram stain result.***    Gram stain, PCR,and/or culture results may not always    correspond due to difference in methodologies.    ************************************************************    This PCR assay was performed by multiplex PCR. This    Assay tests for 66 bacterial and resistance genetargets.    Please refer to the [facility] test directory    at [email or web address] for details.  Organism: Blood Culture PCR  Enterococcus faecalis (02-06-23 @ 20:20)  Organism: Enterococcus faecalis (02-06-23 @ 20:20)      -  Ampicillin: S <=2 Predicts results to ampicillin/sulbactam, amoxacillin-clavulanate and  piperacillin-tazobactam.      -  Gentamicin synergy: S <=500      -  Streptomycin synergy: S <=1000      -  Vancomycin: S 2      Method Type: JIM  Organism: Blood Culture PCR (02-06-23 @ 20:20)      -  Enterococcus faecalis: Detec      -  Staphylococcus epidermidis, Methicillin resistant: Detec      Method Type: PCR    Culture - Blood (collected 01-30-23 @ 12:01)  Source: .Blood Blood-Peripheral  Gram Stain (02-01-23 @ 22:58):    Growth in aerobic bottle: Gram Positive Cocci in Clusters    Growth in anaerobic bottle: Gram Positive Cocci in Clusters  Final Report (02-02-23 @ 10:29):    Growth in aerobic and anaerobic bottles: Staphylococcus epidermidis    ***Blood Panel PCR results on this specimen are available    approximately 3 hours after the Gram stain result.***    Gram stain, PCR, and/or culture results may not always    correspond due to difference in methodologies.    ************************************************************    This PCR assay was performed by multiplex PCR. This    Assay tests for 66 bacterial and resistance gene targets.    Please refer to the [facility] test directory    at [email or web address] for details.  Organism: Blood Culture PCR  Staphylococcus epidermidis (02-02-23 @ 10:29)  Organism: Staphylococcus epidermidis (02-02-23 @ 10:29)      -  Ampicillin/Sulbactam: R 16/8      -  Cefazolin: R >16      -  Clindamycin: R >4      -  Erythromycin: R >4      -  Gentamicin: S <=1 Should not be used as monotherapy      -  Oxacillin: R >2      -  Penicillin: R >8      -  Rifampin: S <=1 Should not be used as monotherapy      -  Tetracycline: S <=1      -  Trimethoprim/Sulfamethoxazole: R >2/38      -  Vancomycin: S 1      Method Type: JIM  Organism: Blood Culture PCR (02-02-23 @ 10:29)      -  Staphylococcus epidermidis, Methicillin resistant: Detec      Method Type: PCR        Influenza A (RapRVP): Olga (30 Jan 2023 07:31)    Radiology: reviewed     Medications:  acetaminophen     Tablet .. 650 milliGRAM(s) Oral every 6 hours PRN  aluminum hydroxide/magnesium hydroxide/simethicone Suspension 30 milliLiter(s) Oral every 4 hours PRN  benzocaine/menthol Lozenge 1 Lozenge Oral every 8 hours PRN  budesonide 160 MICROgram(s)/formoterol 4.5 MICROgram(s) Inhaler 2 Puff(s) Inhalation two times a day  chlorhexidine 2% Cloths 1 Application(s) Topical daily  cholecalciferol 2000 Unit(s) Oral daily  citalopram 10 milliGRAM(s) Oral daily  decitabine IVPB (eMAR) 30 milliGRAM(s) IV Intermittent every 24 hours  fluticasone propionate 50 MICROgram(s)/spray Nasal Spray 1 Spray(s) Both Nostrils two times a day  folic acid 1 milliGRAM(s) Oral daily  guaiFENesin  milliGRAM(s) Oral every 12 hours  influenza  Vaccine (HIGH DOSE) 0.7 milliLiter(s) IntraMuscular once  ipratropium    for Nebulization 500 MICROGram(s) Nebulizer every 6 hours  lactobacillus acidophilus 1 Tablet(s) Oral daily  melatonin 3 milliGRAM(s) Oral at bedtime PRN  metoprolol tartrate 25 milliGRAM(s) Oral two times a day  mirtazapine 7.5 milliGRAM(s) Oral daily  ondansetron Injectable 4 milliGRAM(s) IV Push every 8 hours PRN  ondansetron Injectable 8 milliGRAM(s) IV Push every 24 hours  pantoprazole    Tablet 40 milliGRAM(s) Oral before breakfast  polyethylene glycol 3350 17 Gram(s) Oral daily PRN  predniSONE   Tablet 20 milliGRAM(s) Oral daily  senna 1 Tablet(s) Oral daily  sodium chloride 0.65% Nasal 2 Spray(s) Both Nostrils every 6 hours  trimethoprim  160 mG/sulfamethoxazole 800 mG 1 Tablet(s) Oral daily    Current Antimicrobials:  trimethoprim  160 mG/sulfamethoxazole 800 mG 1 Tablet(s) Oral daily    Prior/Completed Antimicrobials:  cefepime   IVPB  nitrofurantoin monohydrate/macrocrystals (MACROBID)  vancomycin  IVPB

## 2023-02-11 NOTE — PROGRESS NOTE ADULT - SUBJECTIVE AND OBJECTIVE BOX
Chief Complaint: MDS    INTERVAL HPI/OVERNIGHT EVENTS: Tolerating Dacogen without issues. Denies any N/V, diarrhea. Poor appetite,.     MEDICATIONS  (STANDING):  Biotene Dry Mouth Oral Rinse 15 milliLiter(s) Swish and Spit four times a day  budesonide 160 MICROgram(s)/formoterol 4.5 MICROgram(s) Inhaler 2 Puff(s) Inhalation two times a day  chlorhexidine 2% Cloths 1 Application(s) Topical daily  cholecalciferol 2000 Unit(s) Oral daily  citalopram 10 milliGRAM(s) Oral daily  decitabine IVPB (eMAR) 30 milliGRAM(s) IV Intermittent every 24 hours  FIRST- Mouthwash  BLM 10 milliLiter(s) Swish and Spit four times a day  fluticasone propionate 50 MICROgram(s)/spray Nasal Spray 1 Spray(s) Both Nostrils two times a day  folic acid 1 milliGRAM(s) Oral daily  guaiFENesin  milliGRAM(s) Oral every 12 hours  influenza  Vaccine (HIGH DOSE) 0.7 milliLiter(s) IntraMuscular once  ipratropium    for Nebulization 500 MICROGram(s) Nebulizer every 6 hours  lactobacillus acidophilus 1 Tablet(s) Oral daily  metoprolol tartrate 25 milliGRAM(s) Oral two times a day  mirtazapine 7.5 milliGRAM(s) Oral daily  ondansetron Injectable 8 milliGRAM(s) IV Push every 24 hours  pantoprazole    Tablet 40 milliGRAM(s) Oral before breakfast  predniSONE   Tablet 20 milliGRAM(s) Oral daily  senna 1 Tablet(s) Oral daily  sodium chloride 0.65% Nasal 2 Spray(s) Both Nostrils every 6 hours  trimethoprim  160 mG/sulfamethoxazole 800 mG 1 Tablet(s) Oral daily    MEDICATIONS  (PRN):  acetaminophen     Tablet .. 650 milliGRAM(s) Oral every 6 hours PRN Temp greater or equal to 38C (100.4F), Mild Pain (1 - 3)  aluminum hydroxide/magnesium hydroxide/simethicone Suspension 30 milliLiter(s) Oral every 4 hours PRN Dyspepsia  benzocaine/menthol Lozenge 1 Lozenge Oral every 8 hours PRN Sore Throat  melatonin 3 milliGRAM(s) Oral at bedtime PRN Insomnia  ondansetron Injectable 4 milliGRAM(s) IV Push every 8 hours PRN Nausea and/or Vomiting  polyethylene glycol 3350 17 Gram(s) Oral daily PRN Constipation      Allergies    codeine (Nausea)  doxycycline (Nausea)  penicillin (Hives)    Intolerances        ROS: as above     Vital Signs Last 24 Hrs  T(C): 36.5 (14 Feb 2023 05:35), Max: 36.6 (13 Feb 2023 10:50)  T(F): 97.7 (14 Feb 2023 05:35), Max: 97.8 (13 Feb 2023 10:50)  HR: 101 (14 Feb 2023 05:35) (89 - 107)  BP: 111/57 (14 Feb 2023 05:35) (103/58 - 133/76)  BP(mean): --  RR: 18 (14 Feb 2023 05:35) (18 - 19)  SpO2: 94% (14 Feb 2023 05:35) (94% - 99%)    Parameters below as of 14 Feb 2023 05:35  Patient On (Oxygen Delivery Method): nasal cannula  O2 Flow (L/min): 2      Physical Exam:   AAO x 3, NAD   RRR S1S2  CTA b/l   soft NTNDBS+  no c/c/e    LABS:                        8.9    53.70 )-----------( 8        ( 14 Feb 2023 06:38 )             26.8     02-14    140  |  106  |  29<H>  ----------------------------<  119<H>  3.5   |  19<L>  |  0.80    Ca    9.0      14 Feb 2023 06:38  Phos  2.8     02-14  Mg     2.0     02-14    TPro  6.4  /  Alb  3.3  /  TBili  1.6<H>  /  DBili  x   /  AST  21  /  ALT  6<L>  /  AlkPhos  103  02-14

## 2023-02-11 NOTE — PROGRESS NOTE ADULT - ASSESSMENT
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 She presented here from [facility] for elevated WBC and low hemoglobin, severe weakness. Pt states for the past 2-3 days has been having increased weakness and lethargy, decreased appetite. +sputum productive cough. + cui, no sob, no difficulty breathing. No abdominal pain, nausea, vomiting, no bloody stools. Has endorsed multiple episodes of loose stools x weeks, currently being treated for c.diff with oral vancomycin. Nephrology consulted for Hyponatremia.       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

## 2023-02-11 NOTE — PROGRESS NOTE ADULT - ASSESSMENT
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 Reports chronic cough, currently nonproductive - RVP/COVID negative. Has multiple episodes of loose stools x weeks, reported recently trying marijuana for appetite and noted worsening. She was given oral vancomycin empirically for C.diff but then became constipated, s/p bowel regimen and having normal bowel movements. Patient initially being monitored off antibiotics given she was afebrile, WBC was stable and no infectious source was found. She had a fever 100.9F on 1/30 overnight with worsening leukocytosis and then 101.4F overnight 1/31 and noted with confusion and found to have sepsis due to GP bacteremia.   H/o PCN allergy with hives    Sepsis due to gram positive bacteremia    E. faecalis bacteremia - unclear source, ?GI, less likely    Staph epi bacteremia ?skin vs port source vs contamination  AMS - neurotoxicity d/t cefepime vs infectious etiology   --d/c'ed cefepime 1/31, mental status improved   - sepsis resolved - afebrile, WBC trend noted, Bcx cleared   - L chest port without sign of infection  - 2/1 CXR with bibasilar hazy opacification - may be atelectasis or pneumonia   - 1/30 Bcx (1 set sent) with Staph epi - MRSE -- sensitivities noted   - 1/31 Bcx (1 set sent) - aerobic bottle grew E. faecalis (amp and vanc sensitive) and MRSE  - 2/1 repeat Bcx -- Negative x2   - TTE w/o mention of vegetations  - vancomycin level supratherapeutic this am -- discontinued vancomycin-completed 10d course 2/10  - monitor temps/CBC    UTI with E. 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    H/o DLBCL, EBV+   - s/p BMBx 1/23 - found with new MDS   - quantiferon indeterminate - pt on steroids which can cause indeterminate results    - CT - s/p wedge resections in b/l upper lobes, 2 cm nodular opacity a/w staple line in RUL; multiple ill-defined b/l opacities more in RLL -unclear etiology, b/l effusions R>L   - continue on Bactrim DS 1 tablet daily for PCP ppx while on prednisone   - Rheum and Heme/Onc following - noted plan to start on chemotherapy today with decitabine x5d     L epistaxis   - ENT following, packing removed    Thank you for consulting us and involving us in the management of this most interesting and challenging case.  We will follow along in the care of this patient. Please call us at [phone] or text me directly on my cell# at [phone] with any concerns.    On Monday Dr. Yevgeniy Malave will be resuming care for our group. If you have any questions, concerns or new micro data please reach out to them at [phone]

## 2023-02-11 NOTE — ADVANCED PRACTICE NURSE CONSULT - ASSESSMENT
received pt in bed sleepy  but arousable able to verbalise her needs v/s stable  Cycle #1 day 2/5 .Height and weight verified. Lab results as per niranjan, dr Richardson  aware of same. pt ordered for 1unit Platelets and given by the area nurse   Vital signs stable prior to the start of chemotherapy, see sunrise.  Pt educated on the importance of saving urine, verbalize understanding of same .Pt education done re chemo regimen, drug effects and potential side effects, , pt states understanding. Pt reports the she has tolerated previous chemotherapy without side effects .Pt with left chest wall mediport  site free from signs and symptoms of infection, positive blood return obtained. Pre-medicated with zofran 8mg ivp as per order pt started on decitibine 20mg/m2=30mg iv intiated at 1430    received pt in bed sleepy  but arousable able to verbalise her needs v/s stable  Cycle #1 day 2/5 .Height and weight verified. Lab results as per dr Dylan ureña  aware of same. pt ordered for 1unit Platelets and given by the area nurse   Vital signs stable prior to the start of chemotherapy, see sunrise.  Pt educated on the importance of saving urine, verbalize understanding of same .Pt education done re chemo regimen, drug effects and potential side effects, , pt states understanding. Pt reports the she has tolerated previous chemotherapy without side effects .Pt with left chest wall mediport  site free from signs and symptoms of infection, positive blood return obtained. Pre-medicated with zofran 8mg ivp as per order pt started on decitibine 20mg/m2=30mg iv intiated at 1430 infuse over 1hr via locked pump   completed with no adverse effects post v/s stable report given to the area nurse

## 2023-02-11 NOTE — PROGRESS NOTE ADULT - ASSESSMENT
82 yr old female with a PMHx of diffuse large B cell lymphoma in remission (pt has a chemoport), depression, HLD, GERD, PE/DVT (4/2021 no longer on Eliquis), ANCA-vasculitis (20 y/a, no meds), s/p LVATS, LUIS wedge resection (2021), recent direct admit for RVATS, RUL Nodule Biopsy w/ IR marking in [facility] sent in from [facility] rehab for 2-3 days of lethargy and weakness with productive cough, found to have anemia, thrombocytopenia and leukocytosis. Hematology consulted for further work up.      Today's lab WBC 54.6, Hb 7.6, PLT 30K  Will plan for Decitabine 20mg/m2 daily for 5 days.       # New diagnosis MDS with EB2    - CBC at admission with anemia of 5.9; platelet count of 68, wbc of 45.18. Review of records, patient with anemia since at least october 2022, however thrombocytopenia and leukocytosis is new.    - Received pRBC transfusion and responded appropriately, platelets downtrending ~30k   - Iron panel consistent AOCD; Ferritin elevated at 5768; B12 elevated, folate>20    - CT C/A/P with contrast without signs of LAD however, Multiple ill-defined opacities are noted within both lungs, more so in the right lower lobe. Exact etiology is unclear. B/L pleural effusions R>L   - RVP/covid negative,    - peripheral smear reviewed by hematology team during the initial consultation after this admission: no blasts, but immature WBCs noted, no schistocytes, thrombocytopenia noted    - Bone marrow biopsy 1/23/2023; Pathology consistent with MDS- EB 10-15% blasts .   - Hematology team discussed with her hematologist Dr. Madrigal at [facility]: plan is for single agent HMA for now; once follows up post rehab, can consider add on Dave. Rheumatology still considering rituximab; the initial Plan was to give Rituximab then HMA (azacytidine vs dacogen) once ID clears.   -hematology team communicated with Dr. Madrigal and recommended starting HMA Dacogen (decitabine) x 5 days; planning to start Day 1 on 2/10/23.  -Chemo consent signed by pt and put in the chart. hematology team called pt's son at [place]  [phone] to discuss the situation, and her son understands and agrees with the treatment plan.   - bacteremia found on 1/31 blood culx (Staph epidermitis with methicillin resistance)  s/p meropenem and Vanco; now on nitrofurantion until 2/9   -left side Epistaxis found and ENT f/u, nostril pack removed 2/4 per note ; transfusions for plts <10 if afebrile, <15 if febrile, 50 if bleeding, hbg <7: will receive 1u Plt today 2/9 in view of platelet 10     - Please check daily CBC with DIFF and CMP      # Hx of DLBCL EBV+   - Follows with Dr Madrigal, s/p R-mini-chop in 2021; Recent bone marrow biopsy for new anemia in Nov /2022 did not show any disease recurrence.    - repeat imaging this admission without signs of LAD   - COnt Decitabine 20mg/m2 (2/10/23) daily for 5 days, today is D2.   -Trend TLS q24hrs (UA, MG, PH, LDH)  -If UA>5 and less 8 can start allopurinol 300mg daily   -If UA>8 can give rasburicase 3mg once +IVF        # ANCA vasculitis:    - Followed by rheum    - On chronic steroids - 20mg prednisone; on bactrim DS 1 tab daily for ppx

## 2023-02-11 NOTE — PROGRESS NOTE ADULT - SUBJECTIVE AND OBJECTIVE BOX
[facility] DIVISION of INFECTIOUS DISEASE  Victor Manuel Delgado MD PhD, Naila Rogers MD, Deonna Mclean MD, Yevgeniy Malave MD, Gio Tate MD  and providing coverage with Violeta Benoit MD  Providing Infectious Disease Consultations at [facility], [facility], [facility]    Office# [phone] to schedule follow up appointments  Answering Service for urgent calls or New Consults [phone]  Cell# to text for urgent issues Victor Manuel Delgado [phone]     infectious diseases progress note:    BETTIE NGUYEN is a 82y y. o. Female patient    Overnight and events of the last 24hrs reviewed    Allergies    codeine (Nausea)  doxycycline (Nausea)  penicillin (Hives)    Intolerances        ANTIBIOTICS/RELEVANT:  antimicrobials  trimethoprim  160 mG/sulfamethoxazole 800 mG 1 Tablet(s) Oral daily    immunologic:  influenza  Vaccine (HIGH DOSE) 0.7 milliLiter(s) IntraMuscular once    OTHER:  acetaminophen     Tablet .. 650 milliGRAM(s) Oral every 6 hours PRN  aluminum hydroxide/magnesium hydroxide/simethicone Suspension 30 milliLiter(s) Oral every 4 hours PRN  benzocaine/menthol Lozenge 1 Lozenge Oral every 8 hours PRN  budesonide 160 MICROgram(s)/formoterol 4.5 MICROgram(s) Inhaler 2 Puff(s) Inhalation two times a day  chlorhexidine 2% Cloths 1 Application(s) Topical daily  cholecalciferol 2000 Unit(s) Oral daily  citalopram 10 milliGRAM(s) Oral daily  decitabine IVPB (eMAR) 30 milliGRAM(s) IV Intermittent every 24 hours  fluticasone propionate 50 MICROgram(s)/spray Nasal Spray 1 Spray(s) Both Nostrils two times a day  folic acid 1 milliGRAM(s) Oral daily  guaiFENesin  milliGRAM(s) Oral every 12 hours  ipratropium    for Nebulization 500 MICROGram(s) Nebulizer every 6 hours  lactobacillus acidophilus 1 Tablet(s) Oral daily  melatonin 3 milliGRAM(s) Oral at bedtime PRN  metoprolol tartrate 25 milliGRAM(s) Oral two times a day  mirtazapine 7.5 milliGRAM(s) Oral daily  ondansetron Injectable 8 milliGRAM(s) IV Push every 24 hours  ondansetron Injectable 4 milliGRAM(s) IV Push every 8 hours PRN  pantoprazole    Tablet 40 milliGRAM(s) Oral before breakfast  polyethylene glycol 3350 17 Gram(s) Oral daily PRN  predniSONE   Tablet 20 milliGRAM(s) Oral daily  senna 1 Tablet(s) Oral daily  sodium chloride 0.65% Nasal 2 Spray(s) Both Nostrils every 6 hours      Objective:  Vital Signs Last 24 Hrs  T(C): 36.6 (11 Feb 2023 13:00), Max: 36.8 (11 Feb 2023 04:21)  T(F): 97.8 (11 Feb 2023 13:00), Max: 98.3 (11 Feb 2023 04:21)  HR: 84 (11 Feb 2023 13:00) (80 - 91)  BP: 97/63 (11 Feb 2023 13:00) (97/63 - 135/66)  BP(mean): --  RR: 18 (11 Feb 2023 13:00) (17 - 18)  SpO2: 95% (11 Feb 2023 13:00) (92% - 95%)    Parameters below as of 11 Feb 2023 12:45  Patient On (Oxygen Delivery Method): room air        T(C): 36.6 (02-11-23 @ 13:00), Max: 36.8 (02-10-23 @ 04:26)  T(C): 36.6 (02-11-23 @ 13:00), Max: 37 (02-09-23 @ 04:20)  T(C): 36.6 (02-11-23 @ 13:00), Max: 37 (02-09-23 @ 04:20)    PHYSICAL EXAM:  HEENT: NC atraumatic  Neck: supple  Respiratory: no accessory muscle use, breathing comfortably  Cardiovascular: distant  Gastrointestinal: normal appearing, nondistended  Extremities: no clubbing, no cyanosis,        LABS:                          7.7    60.71 )-----------( 10       ( 11 Feb 2023 07:05 )             23.6       WBC  60.71 02-11 @ 07:05  54.66 02-10 @ 06:50  49.53 02-09 @ 06:55  45.24 02-08 @ 06:54  31.08 02-07 @ 07:01  28.03 02-06 @ 06:58  28.78 02-05 @ 07:33      02-11    136  |  103  |  17  ----------------------------<  80  3.9   |  18<L>  |  0.81    Ca    8.7      11 Feb 2023 07:05    TPro  6.0  /  Alb  3.1<L>  /  TBili  0.8  /  DBili  x   /  AST  19  /  ALT  5<L>  /  AlkPhos  74  02-11      Creatinine, Serum: 0.81 mg/dL (02-11-23 @ 07:05)  Creatinine, Serum: 0.73 mg/dL (02-10-23 @ 06:52)  Creatinine, Serum: 0.72 mg/dL (02-09-23 @ 06:55)  Creatinine, Serum: 0.64 mg/dL (02-08-23 @ 06:50)  Creatinine, Serum: 0.61 mg/dL (02-07-23 @ 07:01)  Creatinine, Serum: 0.60 mg/dL (02-06-23 @ 12:38)  Creatinine, Serum: 0.59 mg/dL (02-06-23 @ 06:55)  Creatinine, Serum: 0.68 mg/dL (02-05-23 @ 07:30)                INFLAMMATORY MARKERS      MICROBIOLOGY:              RADIOLOGY & ADDITIONAL STUDIES:

## 2023-02-11 NOTE — PROGRESS NOTE ADULT - SUBJECTIVE AND OBJECTIVE BOX
Feels OK  (+)Malaise    Vital Signs Last 24 Hrs  T(C): 36.8 (11 Feb 2023 04:21), Max: 36.8 (10 Feb 2023 11:40)  T(F): 98.3 (11 Feb 2023 04:21), Max: 98.3 (11 Feb 2023 04:21)  HR: 91 (11 Feb 2023 04:21) (84 - 91)  BP: 118/71 (11 Feb 2023 04:21) (105/59 - 135/66)  BP(mean): --  RR: 18 (11 Feb 2023 04:21) (17 - 18)  SpO2: 93% (11 Feb 2023 04:21) (92% - 98%)    I&O's Summary    02-10-23 @ 07:01  -  02-11-23 @ 07:00  --------------------------------------------------------  IN: 476 mL / OUT: 450 mL / NET: 26 mL        PHYSICAL EXAM:  GENERAL: NAD, well-developed, comfortable on room air  HEAD:  Atraumatic, Normocephalic  EYES: EOMI, PERRLA, conjunctiva and sclera clear  NECK: Supple, No JVD  CHEST/LUNG: mild decrease breath sounds bilaterally; No wheeze   HEART: Regular rate and rhythm; No murmurs, rubs, or gallops  ABDOMEN: Soft, Nontender, Nondistended; Bowel sounds present  Neuro: awake alert, back to baseline mental status. no focal weakness  EXTREMITIES:  2+ Peripheral Pulses, No clubbing, cyanosis, trace b/l edema  SKIN: superficial ecchymoses.     LABS:                        7.7    60.71 )-----------( 10       ( 11 Feb 2023 07:05 )             23.6     02-11    136  |  103  |  17  ----------------------------<  80  3.9   |  18<L>  |  0.81    Ca    8.7      11 Feb 2023 07:05    TPro  6.0  /  Alb  3.1<L>  /  TBili  0.8  /  DBili  x   /  AST  19  /  ALT  5<L>  /  AlkPhos  74  02-11      CAPILLARY BLOOD GLUCOSE                RADIOLOGY & ADDITIONAL TESTS:    Imaging Personally Reviewed:  [x] YES  [ ] NO    Case discussed with NPP:  [X] YES  [ ] NO

## 2023-02-11 NOTE — PROGRESS NOTE ADULT - SUBJECTIVE AND OBJECTIVE BOX
Dr. Hester  Office [phone] (9 am to 5 pm)  Service: [phone] (5pm to 9am)  Niurka OSWALD      RENAL PROGRESS NOTE: DATE OF SERVICE 02-11-23 @ 15:02    Patient is a 82y old  Female who presents with a chief complaint of weakness (11 Feb 2023 13:39)      Patient seen and examined at bedside. No chest pain/sob    VITALS:  T(F): 97.5 (02-11-23 @ 14:10), Max: 98.3 (02-11-23 @ 04:21)  HR: 94 (02-11-23 @ 14:10)  BP: 121/63 (02-11-23 @ 14:10)  RR: 18 (02-11-23 @ 14:10)  SpO2: 92% (02-11-23 @ 14:10)  Wt(kg): --    02-10 @ 07:01  -  02-11 @ 07:00  --------------------------------------------------------  IN: 476 mL / OUT: 450 mL / NET: 26 mL          PHYSICAL EXAM:  Constitutional: NAD  Neck: No JVD  Respiratory: CTAB, no wheezes, rales or rhonchi  Cardiovascular: S1, S2, RRR  Gastrointestinal: BS+, soft, NT/ND  Extremities: No peripheral edema    Hospital Medications:   MEDICATIONS  (STANDING):  budesonide 160 MICROgram(s)/formoterol 4.5 MICROgram(s) Inhaler 2 Puff(s) Inhalation two times a day  chlorhexidine 2% Cloths 1 Application(s) Topical daily  cholecalciferol 2000 Unit(s) Oral daily  citalopram 10 milliGRAM(s) Oral daily  decitabine IVPB (eMAR) 30 milliGRAM(s) IV Intermittent every 24 hours  fluticasone propionate 50 MICROgram(s)/spray Nasal Spray 1 Spray(s) Both Nostrils two times a day  folic acid 1 milliGRAM(s) Oral daily  guaiFENesin  milliGRAM(s) Oral every 12 hours  influenza  Vaccine (HIGH DOSE) 0.7 milliLiter(s) IntraMuscular once  ipratropium    for Nebulization 500 MICROGram(s) Nebulizer every 6 hours  lactobacillus acidophilus 1 Tablet(s) Oral daily  metoprolol tartrate 25 milliGRAM(s) Oral two times a day  mirtazapine 7.5 milliGRAM(s) Oral daily  ondansetron Injectable 8 milliGRAM(s) IV Push every 24 hours  pantoprazole    Tablet 40 milliGRAM(s) Oral before breakfast  predniSONE   Tablet 20 milliGRAM(s) Oral daily  senna 1 Tablet(s) Oral daily  sodium chloride 0.65% Nasal 2 Spray(s) Both Nostrils every 6 hours  trimethoprim  160 mG/sulfamethoxazole 800 mG 1 Tablet(s) Oral daily      LABS:  02-11    136  |  103  |  17  ----------------------------<  80  3.9   |  18<L>  |  0.81    Ca    8.7      11 Feb 2023 07:05    TPro  6.0  /  Alb  3.1<L>  /  TBili  0.8  /  DBili      /  AST  19  /  ALT  5<L>  /  AlkPhos  74  02-11    Creatinine Trend: 0.81 <--, 0.73 <--, 0.72 <--, 0.64 <--, 0.61 <--, 0.60 <--, 0.59 <--, 0.68 <--    Albumin, Serum: 3.1 g/dL (02-11 @ 07:05)                              7.7    60.71 )-----------( 10       ( 11 Feb 2023 07:05 )             23.6     Urine Studies:  Urinalysis - [02-01-23 @ 09:57]      Color Yellow / Appearance Slightly Turbid / SG 1.033 / pH 7.0      Gluc Negative / Ketone Trace  / Bili Negative / Urobili Negative       Blood Small / Protein 300 mg/dL / Leuk Est Negative / Nitrite Negative      RBC 4 /  / Hyaline 4 / Gran  / Sq Epi  / Non Sq Epi 10 / Bacteria Moderate    Urine Sodium 169      [02-07-23 @ 07:13]  Urine Osmolality 814      [02-07-23 @ 07:13]    Iron 152, TIBC 180, %sat 84      [01-19-23 @ 11:19]  Ferritin 5768      [01-19-23 @ 11:19]  Vitamin D (25OH) 28.1      [01-19-23 @ 11:19]  Lipid: chol 84, TG 81, HDL 22, LDL --      [10-01-22 @ 07:10]    HBsAg Nonreact      [01-25-23 @ 07:18]  HBcAb Nonreact      [01-25-23 @ 07:18]  HCV 0.15, Nonreact      [01-25-23 @ 07:18]    MPO-ANCA <5.0, interpretation: Negative      [02-02-23 @ 07:22]  PR3-ANCA <5.0, interpretation: Negative      [02-02-23 @ 07:22]  Free Light Chains: kappa 4.65, lambda 2.60, ratio = 1.79      [01-25 @ 07:18]    RADIOLOGY & ADDITIONAL STUDIES:

## 2023-02-11 NOTE — PROGRESS NOTE ADULT - ASSESSMENT
82 yr old female with a PMHx of diffuse large B cell lymphoma in remission, non-hodgkin's lymphoma (chemoport), depression, HLD, GERD, PE/DVT (4/2021 no longer on Eliquis), ANCA-vasculitis (20 y/a, no meds), s/p LVATS, LUIS wedge resection (2021), recent direct admit for RVATS, RUL Nodule Biopsy w/ IR marking in [facility], path favoring vasculitis, treated with Decadron, then switched to PO prednisone, went to [facility]. She presented here from [facility] for elevated WBC and low hemoglobin, severe weakness. Pt states for the past 2-3 days has been having increased weakness and lethargy, decreased appetite. +sputum productive cough. + cui, no sob, no difficulty breathing. No abdominal pain, nausea, vomiting, no bloody stools. Has endorsed multiple episodes of loose stools x weeks, currently being treated for c.diff with oral vancomycin.     MDS with 10-15% blasts  - S/p bone marrow bx 1/23/23  - Transfusion for plts <10K if afebrile, <15K if febrile, <50K if bleeding  - Transfusion for Hgb <7   - Cleared by ID to initiate chemotherapy  - Decitabine x 5 days; started Day 1 on 2/10/23-2/14/23  - Monitor uric acid daily  - Appreciate hematology    Fatigue/dyspnea: due to severe anemia  - 2' MDS  - transfuse to keep Hgb above 7.0  - no melena, no hematochezia. no BRBPR. occult stool neg.   - she tends to require IV Lasix intermittently post transfusion.  - doubt GI bleed   - Physical therapy. Out of bed to chair with assistance.     Diarrhea, unspecified  - elevated WBC  - no documented c.diff PCR, re-ordered.  - s/p Vanco PO a few days (started in rehab)  - diarrhea completely resolved.   - monitor off PO vanco per ID  - now constipated. PRN bowel regimen started. +BM    Fever, resolved.   - RVP 1/30/23 (-)  - monitor blood cxs 1/30/23  - started empirically on cefepime 1/30/23, switched to merrem 1/31/23 --> 2/3/23  - enterrocus bacteremia, abx per ID. on IV Vanco, last day 2/10/23  - Macrobid x 5 days course added for VRE in urine, last day was 2/8/23  - blood cultures now negative     AMS  - unclear etiology, likely metabolic encephalopathy from ?Cefepime? received 3 doses, last dose 1/31/23  - CT head neg. sugars stable  - vanco IV added per ID.   - close monitoring   - monitor glucose (glucose 65 on BMP, but 95 on fingersticks)  - encourage PO intake   - mental status improved    Pulmonary vasculitis   - Recent TTE on 11/3/22 reviewed: normal LV. outpt card Dr. Ady Cooper  - outpt pulm Mack Tucker   - s/p thoracoscopic biopsy of mediastinal lymph node and Lung wedge resection 11/10/22  - recent pleural effusion s/p 650 cc U/S-guided rt thoracentesis 11/17/22  - exudative but no neutrophilic predominance and initial Gram stain w/o organisms  - cultures neg.   - path results favoring vasculitis: no evidence for lymphoma. Cytology also came back negative.   - comfortable on nasal canula, better post diuretic last admission.   - rheum and heme-onc following previously, will reconsult.   - last admission, she was not started on immunosuppressants such as cellcept given recent treatment for COVID19 at that time  - c/w prednisone 20 mg qdaily.   - RTX under consideration after administration of dacogen --> acute hepatitis panel (-) and quantiferon indeterminate  - ID started PCP ppx with Bactrim.     hx of Tachycardia    - cont low-dose metoprolol, 50 mg to 25 mg dose reduced in rehab.   - card consult (Dr. Hooker) in the past, if needed    Anxiety and depression  - stable, continue citalopram and Remeron.  - Pt denied SI/HI ideations, denied visual and auditory hallucinations.     GERD (gastroesophageal reflux disease)  - continue PPI    Hyperlipidemia.   - continue home ezetimibe. okay to hold if nonformulary     Hyponatremia 127 (hypovolemic?)  - renal on the case, resolved.   - s/p 3 days of IV lasix, now completed. electrolytes supplemented.  - O2 weaned off from ventimask to nascal canula to room air.     DVT ppx   - holding AC in the setting of anemia, pancytopenia.   - venodyne boots LEs

## 2023-02-12 NOTE — PROGRESS NOTE ADULT - ASSESSMENT
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 She presented here from [facility] for elevated WBC and low hemoglobin, severe weakness. Pt states for the past 2-3 days has been having increased weakness and lethargy, decreased appetite. +sputum productive cough. + cui, no sob, no difficulty breathing. No abdominal pain, nausea, vomiting, no bloody stools. Has endorsed multiple episodes of loose stools x weeks, currently being treated for c.diff with oral vancomycin.       Fatigue/dyspnea: likely due to severe anemia :  pt s/p 1 unit PRB  Diarrhea  Pulmonary vasculitis  :  hx of Tachycardia :  Recent TTE on 11/3/22 reviewed: normal LV. outpt card Dr. Ady Cooper  Anxiety and depression  GERD (gastroesophageal reflux disease)  Hyperlipidemia.   DVT ppx     1/20:  Fatigue/dyspnea: likely due to severe anemia :  pt s/p 1 unit PRBC: hb now  > 10  : she is on 2 L of oxygen : no wheezing: no overt signs of bleeding : ct chest is abnormal report noted:  has jean-claude ill defined opacities of unclear etiology  : venous ph is mild acidotic:  no need for Bipap at this time : monitor and trend hb  Diarrhea : symptomatic rx:  workup per primary team  Pulmonary vasculitis  : per rheumatology  : had recent vats surgery : LN biopsy noted:   hx of Tachycardia :  Recent TTE on 11/3/22 reviewed: normal LV. outpt card Dr. Ady Cooper  Anxiety and depression  GERD (gastroesophageal reflux disease) : ppi   Hyperlipidemia.   recent covid  : o n last admission she was treated for covid infection:   DVT ppx   d wteam    1/22:    Fatigue/dyspnea: likely due to severe anemia :  pt s/p 1 unit PRBC: hb now  > 10  : she is on 2 L of oxygen : no wheezing: no overt signs of bleeding : ct chest is abnormal report noted:  has jean-claude ill defined opacities of unclear etiology  : venous ph is mild acidotic:  no need for Bipap at this time : monitor and trend hb: she remained stable o gracie last 1 days:  she is not on any antibtiocs at this time: RVP  and blood cultures are negative: she had ebus biopsy also before: reviewed: ID following  Diarrhea : symptomatic rx:  workup per primary team : seems to have resolved  Pulmonary vasculitis  : per rheumatology  : had recent vats surgery : LN biopsy noted:   Bicytopneia and leucocytosis: ? etiology  : for possible bone marrow biopsy on Monday    hx of Tachycardia :  Recent TTE on 11/3/22 reviewed: normal LV. outpt card Dr. Ady Cooper  Anxiety and depression  GERD (gastroesophageal reflux disease) : ppi   Hyperlipidemia.   recent covid  : on last admission she was treated for covid infection:   DVT ppx   dw team    1/23:    Fatigue/dyspnea: likely due to severe anemia : feels weak  but no bleeding noted:  on 2 L of oxygen : she needs PT OT   Diarrhea :resolved:   Pulmonary vasculitis  : per rheumatology  : had recent vats surgery : LN biopsy noted: : she never received teat ment for vasculitis except low dose steroids:  she is awaiting bone marrow biopsy prior to induction therapy with rituximab: The ct chest done on this admission does not show pneumothorax and has jean-claude effusions:  There are some new opacites in rigth lower lobe which were not therre:  clinically she does not seem to have pneumonia: ID following: could it be a prt of vasculitis  Bicytopneia and leucocytosis: ? etiology  : for possible bone marrow biopsy today  hx of Tachycardia :  Recent TTE on 11/3/22 reviewed: normal LV. outpt card Dr. Ady Cooper  Anxiety and depression  GERD (gastroesophageal reflux disease) : ppi   Hyperlipidemia.   recent covid  : on last admission she was treated for covid infection:   DVT ppx   dw team    1/24:    Fatigue/dyspnea: likely due to severe anemia : feels weak  but no bleeding noted:  on 2 L of oxygen : she needs PT OT   Diarrhea :resolved:   Pulmonary vasculitis  : per rheumatology  : had recent vats surgery : LN biopsy noted: : she never received teat ment for vasculitis except low dose steroids:  she is awaiting bone marrow biopsy prior to induction therapy with rituximab: The ct chest done on this admission does not show pneumothorax and has jean-claude effusions:  There are some new opacites in rigth lower lobe which were not therre:  clinically she does not seem to have pneumonia: ID following: could it be a prt of vasculitis  Bicytopneia and leucocytosis: BM biopsy done: await results for eventual immunosuppressives therapy:   hx of Tachycardia :  Recent TTE on 11/3/22 reviewed: normal LV. outpt card Dr. Ady Cooper  Anxiety and depression  GERD (gastroesophageal reflux disease) : ppi   Hyperlipidemia.   recent covid  : on last admission she was treated for covid infection:   DVT ppx   dw team    1/25:    Fatigue/dyspnea: likely due to severe anemia : feels weak  but no bleeding noted:  on 2 L of oxygen : she needs PT OT   Diarrhea :resolved:   Pulmonary vasculitis  : per rheumatology  : had recent vats surgery : LN biopsy noted: : she never received teat ment for vasculitis except low dose steroids:  she is awaiting bone marrow biopsy prior to induction therapy with rituximab: The ct chest done on this admission does not show pneumothorax and has jean-claude effusions:  There are some new opacites in rigth lower lobe which were not there:  clinically she does not seem to have pneumonia: ID following: could it be a part of vasculitis : her resp status has not changed   Bicytopneia and leucocytosis: BM biopsy done: await results for still pending   hx of Tachycardia :  Recent TTE on 11/3/22 reviewed: normal LV. outpt card Dr. Ady Cooper  Anxiety and depression  GERD (gastroesophageal reflux disease) : ppi   Hyperlipidemia.   recent covid  : on last admission she was treated for covid infection:   DVT ppx   dw team: awaiting decision on immunosuppression     1/26;      Fatigue/dyspnea: likely due to severe anemia : feels weak  but no bleeding noted:  on 2 L of oxygen : she needs PT OT : got it yesterday    Diarrhea :resolved:   Pulmonary vasculitis  : per rheumatology  : had recent vats surgery : LN biopsy noted: : she never received teat ment for vasculitis except low dose steroids:  she is awaiting bone marrow biopsy prior to induction therapy with rituximab: The ct chest done on this admission does not show pneumothorax and has jean-claude effusions:  There are some new opacities in rigth lower lobe which were not there:  clinically she does not seem to have pneumonia: ID following: could it be a part of vasculitis : her resp status has not changed  : being observed off antibiotics   Bicytopneia and leucocytosis: BM biopsy done: await results for still pending   hx of Tachycardia :  Recent TTE on 11/3/22 reviewed: normal LV. outpt card Dr. Ady Cooper  Anxiety and depression  GERD (gastroesophageal reflux disease) : ppi   Hyperlipidemia.   recent covid  : on last admission she was treated for covid infection:   DVT ppx   dw team: awaiting decision on immunosuppression     1/27:    Fatigue/dyspnea: likely due to severe anemia : feels weak  but no bleeding noted:  on 2 L of oxygen : cont  PT OT :   Diarrhea :resolved:   Pulmonary vasculitis  : per rheumatology  : had recent vats surgery : LN biopsy noted: : she never received teat ment for vasculitis except low dose steroids:  she is awaiting bone marrow biopsy prior to induction therapy with rituximab: The ct chest done on this admission does not show pneumothorax and has jean-claude effusions:  There are some new opacities in rigth lower lobe which were not there:  clinically she does not seem to have pneumonia: ID following: could it be a part of vasculitis : her resp status has not changed  : being observed off antibiotics :  on bactrim prophylaxis as she is on chr steroids   Bicytopneia and leucocytosis: BM biopsy done: await results for still pending   hx of Tachycardia :  Recent TTE on 11/3/22 reviewed: normal LV. outpt card Dr. Ady Cooper  Anxiety and depression  GERD (gastroesophageal reflux disease) : ppi   Hyperlipidemia.   recent covid  : on last admission she was treated for covid infection:   DVT ppx   dw team: awaiting decision on immunosuppression     1/29:    Fatigue/dyspnea: likely due to severe anemia : feels weak  but no bleeding noted:  on 2 L of oxygen : cont  PT OT :   Diarrhea :resolved:   Pulmonary vasculitis  : per rheumatology  : had recent vats surgery : LN biopsy noted: : she never received treat ment for vasculitis except low dose steroids:  she is awaiting bone marrow biopsy prior to induction therapy with rituximab: The ct chest done on this admission does not show pneumothorax and has jean-claude effusions:  There are some new opacities in rigth lower lobe which were not there:  clinically she does not seem to have pneumonia: ID following: could it be a part of vasculitis : her resp status has not changed  : being observed off antibiotics :  on bactrim prophylaxis as she is on chr steroids   Bicytopneia and leucocytosis: BM biopsy: results reviewed defer to [facility]  hx of Tachycardia :  Recent TTE on 11/3/22 reviewed: normal LV.   Anxiety and depression  GERD (gastroesophageal reflux disease) : ppi   Hyperlipidemia.   recent covid  : on last admission she was treated for covid infection:   DVT ppx   dw team: awaiting decision on immunosuppression     1/30:    Fatigue/dyspnea: likely due to severe anemia : feels weak  but no bleeding noted:  on 2 L of oxygen : cont  PT OT : sitting in chair today   Diarrhea :resolved:   Pulmonary vasculitis  : per rheumatology  : had recent vats surgery : LN biopsy noted: : she never received treat ment for vasculitis except low dose steroids:  she is awaiting bone marrow biopsy prior to induction therapy with rituximab: The ct chest done on this admission does not show pneumothorax and has jean-claude effusions:  There are some new opacities in rigth lower lobe which were not there:  clinically she does not seem to have pneumonia: ID following: could it be a part of vasculitis : her resp status has not changed  : being observed off antibiotics :  on bactrim prophylaxis as she is on chr steroids  /l however she had low grade tempt today : rvp is negative : cultures sent: ID follow up   Bicytopneia and leucocytosis: BM biopsy: results reviewed defer to [facility]  hx of Tachycardia :  Recent TTE on 11/3/22 reviewed: normal LV.   Anxiety and depression  GERD (gastroesophageal reflux disease) : ppi   Hyperlipidemia.   recent covid  : on last admission she was treated for covid infection:   DVT ppx   dw team: awaiting decision on immunosuppression     1/31:    Fatigue/dyspnea: likely due to severe anemia : feels weak  but no bleeding noted:  on 2 L of oxygen : cont  PT OT : lying in bed:   Diarrhea :resolved:   Pulmonary vasculitis  : per rheumatology  : had recent vats surgery : LN biopsy noted: : she never received treat ment for vasculitis except low dose steroids:  she is awaiting bone marrow biopsy prior to induction therapy with rituximab: The ct chest done on this admission does not show pneumothorax and has jean-claude effusions:  There are some new opacities in rigth lower lobe which were not there:  clinically she does not seem to have pneumonia: ID following: could it be a part of vasculitis : her resp status has not changed  : being observed off antibiotics :  on bactrim prophylaxis as she is on chr steroids : she seems OK:  no sob:     Bicytopneia and leucocytosis: BM biopsy: results reviewed defer to [facility] ; for eventual chemo   hx of Tachycardia :  Recent TTE on 11/3/22 reviewed: normal LV.   Anxiety and depression  GERD (gastroesophageal reflux disease) : ppi   Hyperlipidemia.   recent covid  : on last admission she was treated for covid infection:   DVT ppx   dw team: awaiting decision on immunosuppression     2/1:    Fatigue/dyspnea: likely due to severe anemia : feels weak  but no bleeding noted:  on 2 L of oxygen : cont  PT OT : lying in bed:  her blood cultures are contaminant:   Diarrhea :resolved:   Pulmonary vasculitis  : per rheumatology  : had recent vats surgery : LN biopsy noted: : she never received treat ment for vasculitis except low dose steroids:  she is awaiting bone marrow biopsy prior to induction therapy with rituximab: The ct chest done on this admission does not show pneumothorax and has jean-claude effusions:  There are some new opacities in rigth lower lobe which were not there:  clinically she does not seem to have pneumonia: ID following: could it be a part of vasculitis : her resp status has not changed  : being observed off antibiotics :  on bactrim prophylaxis as she is on chr steroids : she seems OK:  no sob:     Bicytopneia and leucocytosis: BM biopsy: results reviewed defer to [facility] ; for eventual chemo   hx of Tachycardia :  Recent TTE on 11/3/22 reviewed: normal LV.   Anxiety and depression  GERD (gastroesophageal reflux disease) : ppi   Hyperlipidemia.   recent covid  : on last admission she was treated for covid infection:   DVT ppx   dw team: awaiting decision on immunosuppression: overall her general condition seems very poor and very weak:     2/2:    Fatigue/dyspnea: likely due to severe anemia : feels weak  but no bleeding noted:  on 2 L of oxygen : cont  PT OT : lying in bed:  her blood cultures are positive E FAECALIS :  ON MEROPENEM  Diarrhea :resolved:   Pulmonary vasculitis  : per rheumatology  : had recent vats surgery : LN biopsy noted: : she never received treat ment for vasculitis except low dose steroids:  she is awaiting bone marrow biopsy prior to induction therapy with rituximab: The ct chest done on this admission does not show pneumothorax and has jean-claude effusions:  There are some new opacities in rigth lower lobe which were not there:  clinically she does not seem to have pneumonia: ID following: could it be a part of vasculitis : her resp status has not changed  : being observed off antibiotics :  on bactrim prophylaxis as she is on chr steroids : she seems OK:  no sob:     Bicytopneia and leucocytosis: BM biopsy: results reviewed defer to [facility] ; for eventual chemo   hx of Tachycardia :  Recent TTE on 11/3/22 reviewed: normal LV.   Anxiety and depression  GERD (gastroesophageal reflux disease) : ppi   Hyperlipidemia.   recent covid  : on last admission she was treated for covid infection:   DVT ppx   dw team: awaiting decision on immunosuppression: overall her general condition seems very poor and very weak: ON ANTIBIOTICS     2/3   Pulmonary Vasculitis  -Steroids per rheum  -Plan for eventual Rituxan  -Bactrim for PCP ppx  MDS  -Per heme/onc  Bacteremia  -E. Faecalis bacteremia  -ABX per ID  Epistaxis  -Per ENT  -Breathing comfortably on 40% humidified face tent, keep sats >90%    2/6:    2/6  Pulmonary Vasculitis  -Steroids per rheum  -Plan for eventual Rituxan /l she is on room air at this time  -Bactrim for PCP ppx  MDS  -Per heme/onc  Bacteremia  -E. Faecalis bacteremia  -ABX per ID  Epistaxis  -Per ENT  - she is on room air today  : cont to mantain o2 sao2 above 90% all the ti me:     acp      2/7:  Pulmonary Vasculitis  -Steroids per rheum  -Plan for eventual Rituxan /l she is on room air at this time  -Bactrim for PCP ppx  MDS  -Per heme/onc  Bacteremia  -E. Faecalis bacteremia  -ABX per ID : awaiting clearance from id for chemo : last blood cultures have been negative  Epistaxis  -Per ENT  - she is on room air today  : cont to mantain o2 sao2 above 90% all the ti me:     acp    2/8:  Pulmonary Vasculitis  -Steroids per rheum  -Plan for eventual Rituxan /l she is on room air at this time  -Bactrim for PCP ppx  MDS  -Per heme/onc  Bacteremia  -E. Faecalis bacteremia  -ABX per ID : awaiting clearance from id for chemo : last blood cultures have been negative : finishing antbiotics today  :  Epistaxis  -Per ENT  - she is on room air today  : cont to mantain o2 sao2 above 90% all the ti me:  Thrombocytopenia:  sign today  : 14k: no obvious bleeding: cont to monitorial : transfuse per [facility]mo[facility]     acp      2/9:    Pulmonary Vasculitis : Steroids per rheum : Plan for eventual Rituxan /l she is on room air at this time : Bactrim for PCP ppx : on room air: with no change in her resp status  MDS : s'p BM biopsy : has MDS : defer to hemonc  Bacteremia E. Faecalis bacteremia  -ABX per ID : awaiting clearance from id for chemo : last blood cultures have been negative : finished antibiotics   Epistaxis Per ENT : she is on room air today  : cont to mantain o2 sao2 above 90% all the ti me:  Thrombocytopenia:  sign today  : 10k: no obvious bleeding: cont to monitorial : transfuse per [facility] :? for plt transfusion  today      dw acp    2/10:  Pulmonary Vasculitis : Steroids per rheum : Plan for eventual Rituxan /l she is on room air at this time : Bactrim for PCP ppx : on room air: with no change in her resp status : now plan for rituximab aftger 5 days of dacogen   MDS : s'p BM biopsy : has MDS : defer to hemonc  Bacteremia E. Faecalis bacteremia  -ABX per ID : awaiting clearance from id for chemo : last blood cultures have been negative : finished antibiotics   Epistaxis Per ENT : she is on room air today  : cont to mantain o2 sao2 above 90% all the ti me:  Thrombocytopenia:  still low, but much better,  no obvious bleeding: cont to monitorial :  dw acp    2/12:  Pulmonary Vasculitis : Steroids per rheum : Plan for eventual Rituxan /l she is on room air at this time : Bactrim for PCP ppx : on room air: with no change in her resp status : now plan for rituximab after 5 days of dacogen : today is day 3 : doing  ok : no resp idstress  MDS : s'p BM biopsy : has MDS : defer to hemonc  Bacteremia E. Faecalis bacteremia  -ABX per ID : awaiting clearance from id for chemo : last blood cultures have been negative : finished antibiotics   Epistaxis Per ENT : she is on room air today  : cont to mantain o2 sao2 above 90% all the ti me:  Thrombocytopenia:  still low, but much better,  no obvious bleeding: cont to monitor :  dw acp

## 2023-02-12 NOTE — PROGRESS NOTE ADULT - ASSESSMENT
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. She presented here from [facility] for elevated WBC and low hemoglobin, severe weakness. Pt states for the past 2-3 days has been having increased weakness and lethargy, decreased appetite. +sputum productive cough. + cui, no sob, no difficulty breathing. No abdominal pain, nausea, vomiting, no bloody stools. Has endorsed multiple episodes of loose stools x weeks, currently being treated for c.diff with oral vancomycin.     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 no BRBPR. occult stool neg.   - she tends to require IV Lasix intermittently post transfusion.  - doubt GI bleed   - Physical therapy. Out of bed to chair with assistance.     Diarrhea, unspecified  - elevated WBC  - no documented c.diff PCR, re-ordered.  - s/p Vanco PO a few days (started in rehab)  - diarrhea completely resolved.   - monitor off PO vanco per ID  - now constipated. PRN bowel regimen started. +BM    Fever, resolved.   - RVP 1/30/23 (-)  - monitor blood cxs 1/30/23  - started empirically on cefepime 1/30/23, switched to merrem 1/31/23 --> 2/3/23  - enterrocus bacteremia, abx per ID. on IV Vanco, last day 2/10/23  - Macrobid x 5 days course added for VRE in urine, last day was 2/8/23  - blood cultures now negative     AMS  - unclear etiology, likely metabolic encephalopathy from ?Cefepime? received 3 doses, last dose 1/31/23  - CT head neg. sugars stable  - vanco IV added per ID.   - close monitoring   - monitor glucose (glucose 65 on BMP, but 95 on fingersticks)  - encourage PO intake   - mental status improved    Pulmonary vasculitis   - Recent TTE on 11/3/22 reviewed: normal LV. outpt card Dr. Ady Cooper  - outpt pulm Mack Tucker   - s/p thoracoscopic biopsy of mediastinal lymph node and Lung wedge resection 11/10/22  - recent pleural effusion s/p 650 cc U/S-guided rt thoracentesis 11/17/22  - exudative but no neutrophilic predominance and initial Gram stain w/o organisms  - cultures neg.   - path results favoring vasculitis: no evidence for lymphoma. Cytology also came back negative.   - comfortable on nasal canula, better post diuretic last admission.   - rheum and heme-onc following previously, will reconsult.   - last admission, she was not started on immunosuppressants such as cellcept given recent treatment for COVID19 at that time  - c/w prednisone 20 mg qdaily.   - RTX under consideration after administration of dacogen --> acute hepatitis panel (-) and quantiferon indeterminate  - ID started PCP ppx with Bactrim.     hx of Tachycardia    - cont low-dose metoprolol, 50 mg to 25 mg dose reduced in rehab.   - card consult (Dr. Hooker) in the past, if needed    Anxiety and depression  - stable, continue citalopram and Remeron.  - Pt denied SI/HI ideations, denied visual and auditory hallucinations.     GERD (gastroesophageal reflux disease)  - continue PPI    Hyperlipidemia.   - continue home ezetimibe. okay to hold if nonformulary     Hyponatremia 127 (hypovolemic?)  - renal on the case, resolved.   - s/p 3 days of IV lasix, now completed. electrolytes supplemented.  - O2 weaned off from ventimask to nascal canula to room air.     DVT ppx   - holding AC in the setting of anemia, pancytopenia.   - venodyne boots LEs

## 2023-02-12 NOTE — PROGRESS NOTE ADULT - SUBJECTIVE AND OBJECTIVE BOX
Date of Service: 02-12-23 @ 14:53    Patient is a 82y old  Female who presents with a chief complaint of weakness (12 Feb 2023 13:50)      Any change in ROS:  doing  ok : no sob:     MEDICATIONS  (STANDING):  budesonide 160 MICROgram(s)/formoterol 4.5 MICROgram(s) Inhaler 2 Puff(s) Inhalation two times a day  chlorhexidine 2% Cloths 1 Application(s) Topical daily  cholecalciferol 2000 Unit(s) Oral daily  citalopram 10 milliGRAM(s) Oral daily  decitabine IVPB (eMAR) 30 milliGRAM(s) IV Intermittent every 24 hours  fluticasone propionate 50 MICROgram(s)/spray Nasal Spray 1 Spray(s) Both Nostrils two times a day  folic acid 1 milliGRAM(s) Oral daily  guaiFENesin  milliGRAM(s) Oral every 12 hours  influenza  Vaccine (HIGH DOSE) 0.7 milliLiter(s) IntraMuscular once  ipratropium    for Nebulization 500 MICROGram(s) Nebulizer every 6 hours  lactobacillus acidophilus 1 Tablet(s) Oral daily  metoprolol tartrate 25 milliGRAM(s) Oral two times a day  mirtazapine 7.5 milliGRAM(s) Oral daily  ondansetron Injectable 8 milliGRAM(s) IV Push every 24 hours  pantoprazole    Tablet 40 milliGRAM(s) Oral before breakfast  potassium chloride   Powder 40 milliEquivalent(s) Oral every 4 hours  predniSONE   Tablet 20 milliGRAM(s) Oral daily  senna 1 Tablet(s) Oral daily  sodium chloride 0.65% Nasal 2 Spray(s) Both Nostrils every 6 hours  trimethoprim  160 mG/sulfamethoxazole 800 mG 1 Tablet(s) Oral daily    MEDICATIONS  (PRN):  acetaminophen     Tablet .. 650 milliGRAM(s) Oral every 6 hours PRN Temp greater or equal to 38C (100.4F), Mild Pain (1 - 3)  aluminum hydroxide/magnesium hydroxide/simethicone Suspension 30 milliLiter(s) Oral every 4 hours PRN Dyspepsia  benzocaine/menthol Lozenge 1 Lozenge Oral every 8 hours PRN Sore Throat  melatonin 3 milliGRAM(s) Oral at bedtime PRN Insomnia  ondansetron Injectable 4 milliGRAM(s) IV Push every 8 hours PRN Nausea and/or Vomiting  polyethylene glycol 3350 17 Gram(s) Oral daily PRN Constipation    Vital Signs Last 24 Hrs  T(C): 38.1 (12 Feb 2023 14:35), Max: 38.1 (12 Feb 2023 14:35)  T(F): 100.6 (12 Feb 2023 14:35), Max: 100.6 (12 Feb 2023 14:35)  HR: 122 (12 Feb 2023 14:35) (86 - 122)  BP: 112/69 (12 Feb 2023 14:35) (107/47 - 129/65)  BP(mean): --  RR: 18 (12 Feb 2023 11:02) (18 - 18)  SpO2: 88% (12 Feb 2023 14:35) (88% - 93%)    Parameters below as of 12 Feb 2023 14:35  Patient On (Oxygen Delivery Method): room air        I&O's Summary    11 Feb 2023 07:01  -  12 Feb 2023 07:00  --------------------------------------------------------  IN: 56 mL / OUT: 0 mL / NET: 56 mL          Physical Exam:   GENERAL: NAD, well-groomed, well-developed  HEENT: RANJIT/   Atraumatic, Normocephalic  ENMT: No tonsillar erythema, exudates, or enlargement; Moist mucous membranes, Good dentition, No lesions  NECK: Supple, No JVD, Normal thyroid  CHEST/LUNG: Clear to auscultaion  CVS: Regular rate and rhythm; No murmurs, rubs, or gallops  GI: : Soft, Nontender, Nondistended; Bowel sounds present  NERVOUS SYSTEM:  Alert & Oriented X3  EXTREMITIES:  2+ Peripheral Pulses, No clubbing, cyanosis, or edema  LYMPH: No lymphadenopathy noted  SKIN: No rashes or lesions  ENDOCRINOLOGY: No Thyromegaly  PSYCH: Appropriate    Labs:                              7.2    62.29 )-----------( 18       ( 12 Feb 2023 07:05 )             21.9                         7.7    60.71 )-----------( 10       ( 11 Feb 2023 07:05 )             23.6                         7.6    54.66 )-----------( 30       ( 10 Feb 2023 06:50 )             24.1                         9.0    49.53 )-----------( 10       ( 09 Feb 2023 06:55 )             26.7     02-12    138  |  102  |  20  ----------------------------<  66<L>  3.3<L>   |  21<L>  |  0.83  02-11    136  |  103  |  17  ----------------------------<  80  3.9   |  18<L>  |  0.81  02-10    136  |  101  |  16  ----------------------------<  66<L>  3.3<L>   |  21<L>  |  0.73  02-09    133<L>  |  99  |  15  ----------------------------<  73  3.7   |  22  |  0.72    Ca    8.8      12 Feb 2023 07:04  Ca    8.7      11 Feb 2023 07:05    TPro  6.1  /  Alb  3.5  /  TBili  0.8  /  DBili  x   /  AST  15  /  ALT  6<L>  /  AlkPhos  78  02-12  TPro  6.0  /  Alb  3.1<L>  /  TBili  0.8  /  DBili  x   /  AST  19  /  ALT  5<L>  /  AlkPhos  74  02-11  TPro  6.0  /  Alb  3.2<L>  /  TBili  0.7  /  DBili  x   /  AST  17  /  ALT  6<L>  /  AlkPhos  69  02-10  TPro  6.1  /  Alb  3.3  /  TBili  0.7  /  DBili  x   /  AST  17  /  ALT  6<L>  /  AlkPhos  71  02-09    CAPILLARY BLOOD GLUCOSE      POCT Blood Glucose.: 109 mg/dL (12 Feb 2023 10:50)      LIVER FUNCTIONS - ( 12 Feb 2023 07:04 )  Alb: 3.5 g/dL / Pro: 6.1 g/dL / ALK PHOS: 78 U/L / ALT: 6 U/L / AST: 15 U/L / GGT: x                     RECENT CULTURES:        RESPIRATORY CULTURES:    rad< from: Xray Chest 1 View- PORTABLE-Urgent (Xray Chest 1 View- PORTABLE-Urgent .) (02.01.23 @ 11:14) >  HEART AND MEDIASTINUM: The heart size is not accurately measured in this   projection.    SKELETON: No acute osseous abnormalities.    IMPRESSION:    Bibasilar hazy opacification. This may represent atelectasis or pneumonia    --- End of Report ---          JAMIE DURAN MD; Resident Radiologist  This document has been electronically signed.  LOAN TODD MD; Attending Radiologist  This document has been electronically signed. Feb 1 2023  4:03PM    < end of copied text >        Studies  Chest X-RAY  CT SCAN Chest   Venous Dopplers: LE:   CT Abdomen  Others

## 2023-02-12 NOTE — CHART NOTE - NSCHARTNOTEFT_GEN_A_CORE
Called by RN For patient with fever of 100.6 axillary and hypoxic to 88% on room air. Patient seen and reports pain in her left shoulder but otherwise without complaint. Denies SOB, Chest pain, subjective fever, dizziness, nausea, vomiting, diarrhea, brbpr, melena, further epistaxis.     Vital signs: 112/69 Temp 100.6F  88% on Room air     General NAD  HEENT - no obvious active bleeding, dark dried blood at corner of lips from previous epistaxis  CVS S1S2 regular and tachy  Lungs: grossly clear though did not give best respiratory effort.   ABd: soft, NT/ND, BS+  Neuro: Awake and alert, grossly non-focal  Vasc: Grossly intact    Will place patient on 2L O2 and monitor saturation, will check blood cultures, RVP, UA with reflex to UCx, Chest - xray and repeat CBC to ensure that PLT > 15 given fever. Discussed with Dr. Hicks and advised currently on bactrim PCP, no further ABx unless patient febrile to 101.5 or greater.  WIll follow up results.

## 2023-02-12 NOTE — ADVANCED PRACTICE NURSE CONSULT - ASSESSMENT
Patient received in bed, alert and oriented x 3, capable of expressing all needs to staff.  Height and weight verified. Consent in chart. Lab results as per sunrise-Dr Alejo/Dr Nye  aware of same- Vital signs taken P/T treatment-temp=38.1 ax-hr 122 sat on r/a=88-NP notified and in to see pt-Dr Nye/Dr alejo aware as well-continue with tx as ordered; Reviewed with pt drugs dosages and possible side effects- pt states understanding of all education- Reports that she has been tolerating chemotherapy well without side effects. Pt with previously accessed left CW mediport- site free from signs and symptoms of infection-positive blood return obtained. Pre- medicated with Zofran 8mg IV x1; Dacogen 20mg/m2=30mg in 50cc NS infused via locked pump over 1 hr.    Patient received in bed, alert and oriented x 3, capable of expressing all needs to staff.  Height and weight verified. Consent in chart. Lab results as per sunrise-Dr Alejo/Dr Nye  aware of same- Vital signs taken P/T treatment-temp=38.1 ax-hr 122 sat on r/a=88-NP notified and in to see pt-Dr Nye/Dr alejo aware as well-continue with tx as ordered-bld and urine cx ordered and port CXR done; Reviewed with pt drugs dosages and possible side effects- pt states understanding of all education- Reports that she has been tolerating chemotherapy well without side effects. Pt with previously accessed left CW mediport- site free from signs and symptoms of infection-positive blood return obtained. Pre- medicated with Zofran 8mg IV x1; Dacogen 20mg/m2=30mg in 50cc NS infused via locked pump over 1 hr. Pt tolerated well-no adverse reaction noted-Phyllis ARENAS given report    Patient received in bed, alert and oriented x 3, capable of expressing all needs to staff.  Height and weight verified. Consent in chart. Lab results as per sunrise-Dr Alejo/Dr Nye  aware of same- Vital signs taken P/T treatment-temp=38.1 ax-hr 122 sat on r/a=88-NP notified and in to see pt-Dr Nye/Dr alejo aware as well-continue with tx as ordered-bld and urine cx ordered and port CXR done; Reviewed with pt drugs dosages and possible side effects- pt states understanding of all education- Reports that she has been tolerating chemotherapy well without side effects. Pt with previously accessed left CW mediport- site free from signs and symptoms of infection-positive blood return obtained. Pre- medicated with Zofran 8mg IV x1; Decitibine[dacogen]  20mg/m2=30mg in 50cc NS infused via locked pump over 1 hr. Pt tolerated well-no adverse reaction noted-Phyllis ARENAS given report    Patient received in bed, alert and oriented x 3-sleepy - capable of expressing all needs to staff.  Height and weight verified. Consent in chart. Lab results as per niranjan, Dr Nye/Dr Alejo  aware of same. Vital signs stable prior to chemotherapy taken and pt noted to have temp 38.1 axillary  and O2 sat on r/a=88-NP and heme/onc team notified and all in to evaluate pt- Blood and urine cx and port CXR ordered-OK to continue with tx as ordered; Reviewed chemotherapy regime drugs doses and possible side effects with pt- pt states understanding of education  Reports that she has been tolerating well without side effects. Pt with previously accessed left chestwall mediport- site free from signs and symptoms of infection-positive brisk blood return obtained. Pre- medicated with Zofran 8mg IV x1; Decitibine [dacogen] 20mg/m2=30mg in 50cc NS started @ 1455 infused via locked pump over 1 hour. Pt tolerated well-no adverse reactions noted post v/s stable [see sunrise] . Area nurse Phyllis given full report

## 2023-02-12 NOTE — PROGRESS NOTE ADULT - ASSESSMENT
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 She presented here from [facility] for elevated WBC and low hemoglobin, severe weakness. Pt states for the past 2-3 days has been having increased weakness and lethargy, decreased appetite. +sputum productive cough. + cui, no sob, no difficulty breathing. No abdominal pain, nausea, vomiting, no bloody stools. Has endorsed multiple episodes of loose stools x weeks, currently being treated for c.diff with oral vancomycin. Nephrology consulted for Hyponatremia.       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

## 2023-02-12 NOTE — PROGRESS NOTE ADULT - ATTENDING COMMENTS
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 Patient is on C1 HMA (Decitabine), D3. COnt treatment. Add magic mouthwash and biotin for mouth care

## 2023-02-12 NOTE — PROGRESS NOTE ADULT - SUBJECTIVE AND OBJECTIVE BOX
Dr. Hester  Office [phone] (9 am to 5 pm)  Service: [phone] (5pm to 9am)  Niurka OSWALD      RENAL PROGRESS NOTE: DATE OF SERVICE 02-12-23 @ 13:24    Patient is a 82y old  Female who presents with a chief complaint of weakness (12 Feb 2023 11:49)      Patient seen and examined at bedside. No chest pain/sob    VITALS:  T(F): 96.8 (02-12-23 @ 11:02), Max: 97.7 (02-11-23 @ 21:05)  HR: 113 (02-12-23 @ 11:02)  BP: 107/47 (02-12-23 @ 11:02)  RR: 18 (02-12-23 @ 11:02)  SpO2: 92% (02-12-23 @ 11:02)  Wt(kg): --    02-11 @ 07:01  -  02-12 @ 07:00  --------------------------------------------------------  IN: 56 mL / OUT: 0 mL / NET: 56 mL          PHYSICAL EXAM:  Constitutional: NAD  Neck: No JVD  Respiratory: CTAB, no wheezes, rales or rhonchi  Cardiovascular: S1, S2, RRR  Gastrointestinal: BS+, soft, NT/ND  Extremities: No peripheral edema    Hospital Medications:   MEDICATIONS  (STANDING):  budesonide 160 MICROgram(s)/formoterol 4.5 MICROgram(s) Inhaler 2 Puff(s) Inhalation two times a day  chlorhexidine 2% Cloths 1 Application(s) Topical daily  cholecalciferol 2000 Unit(s) Oral daily  citalopram 10 milliGRAM(s) Oral daily  decitabine IVPB (eMAR) 30 milliGRAM(s) IV Intermittent every 24 hours  fluticasone propionate 50 MICROgram(s)/spray Nasal Spray 1 Spray(s) Both Nostrils two times a day  folic acid 1 milliGRAM(s) Oral daily  guaiFENesin  milliGRAM(s) Oral every 12 hours  influenza  Vaccine (HIGH DOSE) 0.7 milliLiter(s) IntraMuscular once  ipratropium    for Nebulization 500 MICROGram(s) Nebulizer every 6 hours  lactobacillus acidophilus 1 Tablet(s) Oral daily  metoprolol tartrate 25 milliGRAM(s) Oral two times a day  mirtazapine 7.5 milliGRAM(s) Oral daily  ondansetron Injectable 8 milliGRAM(s) IV Push every 24 hours  pantoprazole    Tablet 40 milliGRAM(s) Oral before breakfast  potassium chloride   Powder 40 milliEquivalent(s) Oral every 4 hours  predniSONE   Tablet 20 milliGRAM(s) Oral daily  senna 1 Tablet(s) Oral daily  sodium chloride 0.65% Nasal 2 Spray(s) Both Nostrils every 6 hours  trimethoprim  160 mG/sulfamethoxazole 800 mG 1 Tablet(s) Oral daily      LABS:  02-12    138  |  102  |  20  ----------------------------<  66<L>  3.3<L>   |  21<L>  |  0.83    Ca    8.8      12 Feb 2023 07:04    TPro  6.1  /  Alb  3.5  /  TBili  0.8  /  DBili      /  AST  15  /  ALT  6<L>  /  AlkPhos  78  02-12    Creatinine Trend: 0.83 <--, 0.81 <--, 0.73 <--, 0.72 <--, 0.64 <--, 0.61 <--, 0.60 <--, 0.59 <--    Albumin, Serum: 3.5 g/dL (02-12 @ 07:04)                              7.2    62.29 )-----------( 18       ( 12 Feb 2023 07:05 )             21.9     Urine Studies:  Urinalysis - [02-01-23 @ 09:57]      Color Yellow / Appearance Slightly Turbid / SG 1.033 / pH 7.0      Gluc Negative / Ketone Trace  / Bili Negative / Urobili Negative       Blood Small / Protein 300 mg/dL / Leuk Est Negative / Nitrite Negative      RBC 4 /  / Hyaline 4 / Gran  / Sq Epi  / Non Sq Epi 10 / Bacteria Moderate    Urine Sodium 169      [02-07-23 @ 07:13]  Urine Osmolality 814      [02-07-23 @ 07:13]    Iron 152, TIBC 180, %sat 84      [01-19-23 @ 11:19]  Ferritin 5768      [01-19-23 @ 11:19]  Vitamin D (25OH) 28.1      [01-19-23 @ 11:19]  Lipid: chol 84, TG 81, HDL 22, LDL --      [10-01-22 @ 07:10]    HBsAg Nonreact      [01-25-23 @ 07:18]  HBcAb Nonreact      [01-25-23 @ 07:18]  HCV 0.15, Nonreact      [01-25-23 @ 07:18]    MPO-ANCA <5.0, interpretation: Negative      [02-02-23 @ 07:22]  PR3-ANCA <5.0, interpretation: Negative      [02-02-23 @ 07:22]  Free Light Chains: kappa 4.65, lambda 2.60, ratio = 1.79      [01-25 @ 07:18]    RADIOLOGY & ADDITIONAL STUDIES:

## 2023-02-12 NOTE — PROGRESS NOTE ADULT - SUBJECTIVE AND OBJECTIVE BOX
Hematology Follow-up    INTERVAL HPI/OVERNIGHT EVENTS:  Patient S&E at bedside. No o/n events, patient resting comfortably. No complaints at this time. Patient denies fever, chills, dizziness, weakness, CP, palpitations, SOB, cough, N/V/D/C, dysuria, changes in bowel movements, LE edema.    VITAL SIGNS:  T(F): 96.8 (02-12-23 @ 11:02)  HR: 113 (02-12-23 @ 11:02)  BP: 107/47 (02-12-23 @ 11:02)  RR: 18 (02-12-23 @ 11:02)  SpO2: 92% (02-12-23 @ 11:02)  Wt(kg): --    PHYSICAL EXAM:  Constitutional: AAOx3, NAD,   Eyes: PERRL, EOMI, sclera non-icteric  Neck: supple, no masses, no JVD  Respiratory: CTA b/l, good air entry b/l, no wheezing, rhonchi, rales, with normal respiratory effort and no intercostal retractions  Cardiovascular: RRR, normal S1S2, no M/R/G  Gastrointestinal: soft, NTND, no masses palpable, BS normal in all four quadrants, no HSM  Extremities:  no c/c/e  Neurological: Grossly intact  Skin: Normal temperature    MEDICATIONS  (STANDING):  budesonide 160 MICROgram(s)/formoterol 4.5 MICROgram(s) Inhaler 2 Puff(s) Inhalation two times a day  chlorhexidine 2% Cloths 1 Application(s) Topical daily  cholecalciferol 2000 Unit(s) Oral daily  citalopram 10 milliGRAM(s) Oral daily  decitabine IVPB (eMAR) 30 milliGRAM(s) IV Intermittent every 24 hours  fluticasone propionate 50 MICROgram(s)/spray Nasal Spray 1 Spray(s) Both Nostrils two times a day  folic acid 1 milliGRAM(s) Oral daily  guaiFENesin  milliGRAM(s) Oral every 12 hours  influenza  Vaccine (HIGH DOSE) 0.7 milliLiter(s) IntraMuscular once  ipratropium    for Nebulization 500 MICROGram(s) Nebulizer every 6 hours  lactobacillus acidophilus 1 Tablet(s) Oral daily  metoprolol tartrate 25 milliGRAM(s) Oral two times a day  mirtazapine 7.5 milliGRAM(s) Oral daily  ondansetron Injectable 8 milliGRAM(s) IV Push every 24 hours  pantoprazole    Tablet 40 milliGRAM(s) Oral before breakfast  potassium chloride   Powder 40 milliEquivalent(s) Oral every 4 hours  predniSONE   Tablet 20 milliGRAM(s) Oral daily  senna 1 Tablet(s) Oral daily  sodium chloride 0.65% Nasal 2 Spray(s) Both Nostrils every 6 hours  trimethoprim  160 mG/sulfamethoxazole 800 mG 1 Tablet(s) Oral daily    MEDICATIONS  (PRN):  acetaminophen     Tablet .. 650 milliGRAM(s) Oral every 6 hours PRN Temp greater or equal to 38C (100.4F), Mild Pain (1 - 3)  aluminum hydroxide/magnesium hydroxide/simethicone Suspension 30 milliLiter(s) Oral every 4 hours PRN Dyspepsia  benzocaine/menthol Lozenge 1 Lozenge Oral every 8 hours PRN Sore Throat  melatonin 3 milliGRAM(s) Oral at bedtime PRN Insomnia  ondansetron Injectable 4 milliGRAM(s) IV Push every 8 hours PRN Nausea and/or Vomiting  polyethylene glycol 3350 17 Gram(s) Oral daily PRN Constipation      codeine (Nausea)  doxycycline (Nausea)  penicillin (Hives)      LABS:                        7.2    62.29 )-----------( 18       ( 12 Feb 2023 07:05 )             21.9     02-12    138  |  102  |  20  ----------------------------<  66<L>  3.3<L>   |  21<L>  |  0.83    Ca    8.8      12 Feb 2023 07:04    TPro  6.1  /  Alb  3.5  /  TBili  0.8  /  DBili  x   /  AST  15  /  ALT  6<L>  /  AlkPhos  78  02-12           RADIOLOGY & ADDITIONAL TESTS:  Studies reviewed.

## 2023-02-13 NOTE — PROGRESS NOTE ADULT - SUBJECTIVE AND OBJECTIVE BOX
[facility]   MD RONEL FRIAS MD, PA KRISTINE SOLTANPOUR, DO INJUNG KO, NP    TEL:  OFFICE: [phone]    From 5pm-7am Answering Service [phone]    -- RENAL FOLLOW UP NOTE ---Date of Service 02-13-23 @ 13:09    Patient is a 82y old  Female who presents with a chief complaint of weakness (13 Feb 2023 12:36)      Patient seen and examined at bedside. No chest pain/sob    VITALS:  T(F): 97.6 (02-13-23 @ 11:20), Max: 100.6 (02-12-23 @ 14:35)  HR: 101 (02-13-23 @ 11:20)  BP: 110/67 (02-13-23 @ 11:20)  RR: 19 (02-13-23 @ 11:20)  SpO2: 99% (02-13-23 @ 11:20)  Wt(kg): --    02-12 @ 07:01  -  02-13 @ 07:00  --------------------------------------------------------  IN: 56 mL / OUT: 0 mL / NET: 56 mL          PHYSICAL EXAM:  Constitutional: NAD  Neck: No JVD  Respiratory: CTAB, no wheezes, rales or rhonchi  Cardiovascular: S1, S2, RRR  Gastrointestinal: BS+, soft, NT/ND  Extremities: No peripheral edema    Hospital Medications:   MEDICATIONS  (STANDING):  Biotene Dry Mouth Oral Rinse 15 milliLiter(s) Swish and Spit four times a day  budesonide 160 MICROgram(s)/formoterol 4.5 MICROgram(s) Inhaler 2 Puff(s) Inhalation two times a day  chlorhexidine 2% Cloths 1 Application(s) Topical daily  cholecalciferol 2000 Unit(s) Oral daily  citalopram 10 milliGRAM(s) Oral daily  decitabine IVPB (eMAR) 30 milliGRAM(s) IV Intermittent every 24 hours  FIRST- Mouthwash  BLM 10 milliLiter(s) Swish and Spit four times a day  fluticasone propionate 50 MICROgram(s)/spray Nasal Spray 1 Spray(s) Both Nostrils two times a day  folic acid 1 milliGRAM(s) Oral daily  guaiFENesin  milliGRAM(s) Oral every 12 hours  influenza  Vaccine (HIGH DOSE) 0.7 milliLiter(s) IntraMuscular once  ipratropium    for Nebulization 500 MICROGram(s) Nebulizer every 6 hours  lactobacillus acidophilus 1 Tablet(s) Oral daily  metoprolol tartrate 25 milliGRAM(s) Oral two times a day  mirtazapine 7.5 milliGRAM(s) Oral daily  ondansetron Injectable 8 milliGRAM(s) IV Push every 24 hours  pantoprazole    Tablet 40 milliGRAM(s) Oral before breakfast  predniSONE   Tablet 20 milliGRAM(s) Oral daily  senna 1 Tablet(s) Oral daily  sodium chloride 0.65% Nasal 2 Spray(s) Both Nostrils every 6 hours  trimethoprim  160 mG/sulfamethoxazole 800 mG 1 Tablet(s) Oral daily      LABS:  02-13    138  |  103  |  32<H>  ----------------------------<  149<H>  4.2   |  21<L>  |  1.04    Ca    8.6      13 Feb 2023 09:22  Phos  3.8     02-13  Mg     1.9     02-13    TPro  5.9<L>  /  Alb  3.2<L>  /  TBili  1.0  /  DBili      /  AST  21  /  ALT  5<L>  /  AlkPhos  94  02-13    Creatinine Trend: 1.04 <--, 0.83 <--, 0.81 <--, 0.73 <--, 0.72 <--, 0.64 <--, 0.61 <--    Albumin, Serum: 3.2 g/dL (02-13 @ 09:22)  Phosphorus Level, Serum: 3.8 mg/dL (02-13 @ 09:22)                              6.4    66.95 )-----------( 7        ( 13 Feb 2023 09:22 )             19.7     Urine Studies:  Urinalysis - [02-01-23 @ 09:57]      Color Yellow / Appearance Slightly Turbid / SG 1.033 / pH 7.0      Gluc Negative / Ketone Trace  / Bili Negative / Urobili Negative       Blood Small / Protein 300 mg/dL / Leuk Est Negative / Nitrite Negative      RBC 4 /  / Hyaline 4 / Gran  / Sq Epi  / Non Sq Epi 10 / Bacteria Moderate    Urine Sodium 169      [02-07-23 @ 07:13]  Urine Osmolality 814      [02-07-23 @ 07:13]    Iron 152, TIBC 180, %sat 84      [01-19-23 @ 11:19]  Ferritin 5768      [01-19-23 @ 11:19]  Vitamin D (25OH) 28.1      [01-19-23 @ 11:19]  Lipid: chol 84, TG 81, HDL 22, LDL --      [10-01-22 @ 07:10]    HBsAg Nonreact      [01-25-23 @ 07:18]  HBcAb Nonreact      [01-25-23 @ 07:18]  HCV 0.15, Nonreact      [01-25-23 @ 07:18]    MPO-ANCA <5.0, interpretation: Negative      [02-02-23 @ 07:22]  PR3-ANCA <5.0, interpretation: Negative      [02-02-23 @ 07:22]  Free Light Chains: kappa 4.65, lambda 2.60, ratio = 1.79      [01-25 @ 07:18]    RADIOLOGY & ADDITIONAL STUDIES:

## 2023-02-13 NOTE — PROGRESS NOTE ADULT - SUBJECTIVE AND OBJECTIVE BOX
[facility] DIVISION OF INFECTIOUS DISEASES  ANDREW Rodríguez Y. Patel, S. Shah, G. Casimir  [phone]  ([phone] - weekdays after 5pm and weekends)    Name: BETTIE NGUYEN  Age/Gender: 82y Female  MRN: [id number]    Interval History:  Patient seen and examined this morning.   States she feels tired, no other complaints.  Fever 100.6F yest afternoon, she denies fever/chills.  Notes reviewed. No further fevers noted.     Allergies: codeine (Nausea)  doxycycline (Nausea)  penicillin (Hives)    Objective:  Vitals:   T(F): 97.7 (02-13-23 @ 10:00), Max: 100.6 (02-12-23 @ 14:35)  HR: 106 (02-13-23 @ 10:00) (96 - 122)  BP: 103/58 (02-13-23 @ 10:00) (99/50 - 112/69)  RR: 18 (02-13-23 @ 10:00) (18 - 18)  SpO2: 97% (02-13-23 @ 10:00) (88% - 97%)  Physical Examination:  General: no acute distress, NC 2L  HEENT: NC/AT, EOMI, anicteric, neck supple  Cardio: S1, S2 present, tachycardia   Resp: decreased breath sounds b/l  Abd: soft, NT, ND, + BS  Neuro: AAOx3, no obvious focal deficits  Ext: no edema, no cyanosis, moving extremities  Skin: warm, dry, no visible rash, ecchymosis   Psych: appropriate affect and mood for situation  Lines: L chest port without erythema/TTP    Laboratory Studies:  CBC:                       6.4    66.95 )-----------( 7        ( 13 Feb 2023 09:22 )             19.7     WBC Trend:  66.95 02-13-23 @ 09:22  70.94 02-12-23 @ 17:26  62.29 02-12-23 @ 07:05  60.71 02-11-23 @ 07:05  54.66 02-10-23 @ 06:50  49.53 02-09-23 @ 06:55  45.24 02-08-23 @ 06:54  31.08 02-07-23 @ 07:01    CMP: 02-13    138  |  103  |  32<H>  ----------------------------<  149<H>  4.2   |  21<L>  |  1.04    Ca    8.6      13 Feb 2023 09:22  Phos  3.8     02-13  Mg     1.9     02-13    TPro  5.9<L>  /  Alb  3.2<L>  /  TBili  1.0  /  DBili  x   /  AST  21  /  ALT  x   /  AlkPhos  94  02-13    Creatinine, Serum: 1.04 mg/dL (02-13-23 @ 09:22)  Creatinine, Serum: 0.83 mg/dL (02-12-23 @ 07:04)  Creatinine, Serum: 0.81 mg/dL (02-11-23 @ 07:05)  Creatinine, Serum: 0.73 mg/dL (02-10-23 @ 06:52)  Creatinine, Serum: 0.72 mg/dL (02-09-23 @ 06:55)  Creatinine, Serum: 0.64 mg/dL (02-08-23 @ 06:50)  Creatinine, Serum: 0.61 mg/dL (02-07-23 @ 07:01)  Creatinine, Serum: 0.60 mg/dL (02-06-23 @ 12:38)    LIVER FUNCTIONS - ( 13 Feb 2023 09:22 )  Alb: 3.2 g/dL / Pro: 5.9 g/dL / ALK PHOS: 94 U/L / ALT: x     / AST: 21 U/L / GGT: x           Vancomycin Level, Random: 29.2 ug/mL (02-10-23 @ 06:54)    Microbiology: reviewed   Respiratory Viral Panel with COVID-19 by DIANA (02.12.23 @ 17:26)    Rapid RVP Result: [facility]    SARS-CoV-2: [facility]: This Respiratory Panel uses polymerase chain reaction (PCR) to detect for  adenovirus; coronavirus (HKU1, NL63, 229E, OC43); human metapneumovirus  (hMPV); human enterovirus/rhinovirus (Entero/RV); influenza A; influenza  A/H1; influenza A/H3; influenza A/H1-2009; influenza B; parainfluenza  viruses 1, 2, 3, 4; respiratory syncytial virus; Mycoplasma pneumoniae;  Chlamydophila pneumoniae; and SARS-CoV-2.    Culture - Urine (collected 02-01-23 @ 09:57)  Source: Clean Catch Clean Catch (Midstream)  Final Report (02-04-23 @ 07:54):    >100,000 CFU/ml Enterococcus faecium (vancomycin resistant)  Organism: Enterococcus faecium (vancomycin resistant) (02-04-23 @ 07:54)  Organism: Enterococcus faecium (vancomycin resistant) (02-04-23 @ 07:54)      -  Ampicillin: R >8 Predicts results to ampicillin/sulbactam, amoxacillin-clavulanate and  piperacillin-tazobactam.      -  Ciprofloxacin: R >2      -  Daptomycin: N/A >4      -  Levofloxacin: R >4      -  Linezolid: S 2      -  Nitrofurantoin: S <=32 Should not be used to treat pyelonephritis.      -  Tetracycline: R >8      -  Vancomycin: R >16      Method Type: JIM    Culture - Blood (collected 02-01-23 @ 09:35)  Source: .Blood Blood-Peripheral  Final Report (02-06-23 @ 13:01):    No Growth Final    Culture - Blood (collected 02-01-23 @ 09:20)  Source: .Blood Blood-Peripheral  Final Report (02-06-23 @ 13:01):    No Growth Final    Culture - Urine (collected 02-01-23 @ 02:04)  Source: Clean Catch Clean Catch (Midstream)  Final Report (02-03-23 @ 19:05):    >100,000 CFU/ml Enterococcus faecium (vancomycin resistant)  Organism: Enterococcus faecium (vancomycin resistant) (02-03-23 @ 19:05)  Organism: Enterococcus faecium (vancomycin resistant) (02-03-23 @ 19:05)      -  Ampicillin: R >8 Predicts results to ampicillin/sulbactam, amoxacillin-clavulanate and  piperacillin-tazobactam.      -  Ciprofloxacin: R >2      -  Daptomycin: N/A >4      -  Levofloxacin: R >4      -  Linezolid: S 2      -  Nitrofurantoin: S <=32 Should not be used to treat pyelonephritis.      -  Tetracycline: R >8      -  Vancomycin: R >16      Method Type: JIM    Culture - Blood (collected 01-31-23 @ 10:20)  Source: .Blood Blood-Peripheral  Gram Stain (02-03-23 @ 01:17):    Growth in aerobic bottle: Gram positive cocci in pairs    Growth in anaerobic bottle: Gram positive cocci in pairs  Final Report (02-06-23 @ 20:20):    Growth in aerobic bottle: Enterococcus faecalis    Growth in aerobic and anaerobic bottles: Staphylococcus epidermidis    ***Blood Panel PCR results on this specimen are available    approximately 3 hours after the Gram stain result.***    Gram stain, PCR,and/or culture results may not always    correspond due to difference in methodologies.    ************************************************************    This PCR assay was performed by multiplex PCR. This    Assay tests for 66 bacterial and resistance genetargets.    Please refer to the [facility] test directory    at [email or web address] for details.  Organism: Blood Culture PCR  Enterococcus faecalis (02-06-23 @ 20:20)  Organism: Enterococcus faecalis (02-06-23 @ 20:20)      -  Ampicillin: S <=2 Predicts results to ampicillin/sulbactam, amoxacillin-clavulanate and  piperacillin-tazobactam.      -  Gentamicin synergy: S <=500      -  Streptomycin synergy: S <=1000      -  Vancomycin: S 2      Method Type: JIM  Organism: Blood Culture PCR (02-06-23 @ 20:20)      -  Enterococcus faecalis: Detec      -  Staphylococcus epidermidis, Methicillin resistant: Detec      Method Type: PCR    Culture - Blood (collected 01-30-23 @ 12:01)  Source: .Blood Blood-Peripheral  Gram Stain (02-01-23 @ 22:58):    Growth in aerobic bottle: Gram Positive Cocci in Clusters    Growth in anaerobic bottle: Gram Positive Cocci in Clusters  Final Report (02-02-23 @ 10:29):    Growth in aerobic and anaerobic bottles: Staphylococcus epidermidis    ***Blood Panel PCR results on this specimen are available    approximately 3 hours after the Gram stain result.***    Gram stain, PCR, and/or culture results may not always    correspond due to difference in methodologies.    ************************************************************    This PCR assay was performed by multiplex PCR. This    Assay tests for 66 bacterial and resistance gene targets.    Please refer to the [facility] test directory    at [email or web address] for details.  Organism: Blood Culture PCR  Staphylococcus epidermidis (02-02-23 @ 10:29)  Organism: Staphylococcus epidermidis (02-02-23 @ 10:29)      -  Ampicillin/Sulbactam: R 16/8      -  Cefazolin: R >16      -  Clindamycin: R >4      -  Erythromycin: R >4      -  Gentamicin: S <=1 Should not be used as monotherapy      -  Oxacillin: R >2      -  Penicillin: R >8      -  Rifampin: S <=1 Should not be used as monotherapy      -  Tetracycline: S <=1      -  Trimethoprim/Sulfamethoxazole: R >2/38      -  Vancomycin: S 1      Method Type: JIM  Organism: Blood Culture PCR (02-02-23 @ 10:29)      -  Staphylococcus epidermidis, Methicillin resistant: Detec      Method Type: PCR    Radiology: reviewed     Medications:  acetaminophen     Tablet .. 650 milliGRAM(s) Oral every 6 hours PRN  aluminum hydroxide/magnesium hydroxide/simethicone Suspension 30 milliLiter(s) Oral every 4 hours PRN  benzocaine/menthol Lozenge 1 Lozenge Oral every 8 hours PRN  Biotene Dry Mouth Oral Rinse 15 milliLiter(s) Swish and Spit four times a day  budesonide 160 MICROgram(s)/formoterol 4.5 MICROgram(s) Inhaler 2 Puff(s) Inhalation two times a day  chlorhexidine 2% Cloths 1 Application(s) Topical daily  cholecalciferol 2000 Unit(s) Oral daily  citalopram 10 milliGRAM(s) Oral daily  decitabine IVPB (eMAR) 30 milliGRAM(s) IV Intermittent every 24 hours  FIRST- Mouthwash  BLM 10 milliLiter(s) Swish and Spit four times a day  fluticasone propionate 50 MICROgram(s)/spray Nasal Spray 1 Spray(s) Both Nostrils two times a day  folic acid 1 milliGRAM(s) Oral daily  guaiFENesin  milliGRAM(s) Oral every 12 hours  influenza  Vaccine (HIGH DOSE) 0.7 milliLiter(s) IntraMuscular once  ipratropium    for Nebulization 500 MICROGram(s) Nebulizer every 6 hours  lactobacillus acidophilus 1 Tablet(s) Oral daily  melatonin 3 milliGRAM(s) Oral at bedtime PRN  metoprolol tartrate 25 milliGRAM(s) Oral two times a day  mirtazapine 7.5 milliGRAM(s) Oral daily  ondansetron Injectable 8 milliGRAM(s) IV Push every 24 hours  ondansetron Injectable 4 milliGRAM(s) IV Push every 8 hours PRN  pantoprazole    Tablet 40 milliGRAM(s) Oral before breakfast  polyethylene glycol 3350 17 Gram(s) Oral daily PRN  predniSONE   Tablet 20 milliGRAM(s) Oral daily  senna 1 Tablet(s) Oral daily  sodium chloride 0.65% Nasal 2 Spray(s) Both Nostrils every 6 hours  trimethoprim  160 mG/sulfamethoxazole 800 mG 1 Tablet(s) Oral daily    Current Antimicrobials:  trimethoprim  160 mG/sulfamethoxazole 800 mG 1 Tablet(s) Oral daily    Prior/Completed Antimicrobials:  cefepime   IVPB  nitrofurantoin monohydrate/macrocrystals (MACROBID)  vancomycin  IVPB

## 2023-02-13 NOTE — PROGRESS NOTE ADULT - ASSESSMENT
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 She presented here from [facility] for elevated WBC and low hemoglobin, severe weakness. Pt states for the past 2-3 days has been having increased weakness and lethargy, decreased appetite. +sputum productive cough. + cui, no sob, no difficulty breathing. No abdominal pain, nausea, vomiting, no bloody stools. Has endorsed multiple episodes of loose stools x weeks, currently being treated for c.diff with oral vancomycin.     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 no BRBPR. occult stool neg.   - she tends to require IV Lasix intermittently post transfusion.  - doubt GI bleed   - Physical therapy. Out of bed to chair with assistance.     Diarrhea, unspecified  - elevated WBC  - no documented c.diff PCR, re-ordered.  - s/p Vanco PO a few days (started in rehab)  - diarrhea completely resolved.   - monitor off PO vanco per ID  - now constipated. PRN bowel regimen started. +BM    Fever, resolved.   - RVP 1/30/23 (-)  - monitor blood cxs 1/30/23  - started empirically on cefepime 1/30/23, switched to merrem 1/31/23 --> 2/3/23  - enterrocus bacteremia, abx per ID. on IV Vanco, last day 2/10/23  - Macrobid x 5 days course added for VRE in urine, last day was 2/8/23  - blood cultures now negative     AMS  - unclear etiology, likely metabolic encephalopathy from ?Cefepime? received 3 doses, last dose 1/31/23  - CT head neg. sugars stable  - vanco IV added per ID.   - close monitoring   - monitor glucose (glucose 65 on BMP, but 95 on fingersticks)  - encourage PO intake   - mental status improved    Pulmonary vasculitis   - Recent TTE on 11/3/22 reviewed: normal LV. outpt card Dr. Ady Cooper  - outpt pulm Mack Tucker   - s/p thoracoscopic biopsy of mediastinal lymph node and Lung wedge resection 11/10/22  - recent pleural effusion s/p 650 cc U/S-guided rt thoracentesis 11/17/22  - exudative but no neutrophilic predominance and initial Gram stain w/o organisms  - cultures neg.   - path results favoring vasculitis: no evidence for lymphoma. Cytology also came back negative.   - comfortable on nasal canula, better post diuretic last admission.   - rheum and heme-onc following previously, will reconsult.   - last admission, she was not started on immunosuppressants such as cellcept given recent treatment for COVID19 at that time  - c/w prednisone 20 mg qdaily.   - RTX under consideration after administration of dacogen --> acute hepatitis panel (-) and quantiferon indeterminate  - ID started PCP ppx with Bactrim.     hx of Tachycardia    - cont low-dose metoprolol, 50 mg to 25 mg dose reduced in rehab.   - card consult (Dr. Hooker) in the past, if needed    Anxiety and depression  - stable, continue citalopram and Remeron.  - Pt denied SI/HI ideations, denied visual and auditory hallucinations.     GERD (gastroesophageal reflux disease)  - continue PPI    Hyperlipidemia.   - continue home ezetimibe. okay to hold if nonformulary     Hyponatremia 127 (hypovolemic?)  - renal on the case, resolved.   - s/p 3 days of IV lasix, now completed. electrolytes supplemented.  - O2 weaned off from ventimask to nascal canula to room air.     DVT ppx   - holding AC in the setting of anemia, pancytopenia.   - venodyne boots LEs

## 2023-02-13 NOTE — PROGRESS NOTE ADULT - ATTENDING COMMENTS
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 Patient is on C1 HMA (Decitabine), D4. Will complete C1 tomorrow. She also has ANCA vasculitis and is planned to have RTXN by Rheumatology. Monitor counts, Abx ppx, supportive transfusion as needed.

## 2023-02-13 NOTE — PROGRESS NOTE ADULT - SUBJECTIVE AND OBJECTIVE BOX
Date of Service: 02-13-23 @ 12:36    Patient is a 82y old  Female who presents with a chief complaint of weakness (13 Feb 2023 12:28)      Any change in ROS: seems to bne very weak  : looks ill:  not in any resp distress:     MEDICATIONS  (STANDING):  Biotene Dry Mouth Oral Rinse 15 milliLiter(s) Swish and Spit four times a day  budesonide 160 MICROgram(s)/formoterol 4.5 MICROgram(s) Inhaler 2 Puff(s) Inhalation two times a day  chlorhexidine 2% Cloths 1 Application(s) Topical daily  cholecalciferol 2000 Unit(s) Oral daily  citalopram 10 milliGRAM(s) Oral daily  decitabine IVPB (eMAR) 30 milliGRAM(s) IV Intermittent every 24 hours  FIRST- Mouthwash  BLM 10 milliLiter(s) Swish and Spit four times a day  fluticasone propionate 50 MICROgram(s)/spray Nasal Spray 1 Spray(s) Both Nostrils two times a day  folic acid 1 milliGRAM(s) Oral daily  guaiFENesin  milliGRAM(s) Oral every 12 hours  influenza  Vaccine (HIGH DOSE) 0.7 milliLiter(s) IntraMuscular once  ipratropium    for Nebulization 500 MICROGram(s) Nebulizer every 6 hours  lactobacillus acidophilus 1 Tablet(s) Oral daily  metoprolol tartrate 25 milliGRAM(s) Oral two times a day  mirtazapine 7.5 milliGRAM(s) Oral daily  ondansetron Injectable 8 milliGRAM(s) IV Push every 24 hours  pantoprazole    Tablet 40 milliGRAM(s) Oral before breakfast  predniSONE   Tablet 20 milliGRAM(s) Oral daily  senna 1 Tablet(s) Oral daily  sodium chloride 0.65% Nasal 2 Spray(s) Both Nostrils every 6 hours  trimethoprim  160 mG/sulfamethoxazole 800 mG 1 Tablet(s) Oral daily    MEDICATIONS  (PRN):  acetaminophen     Tablet .. 650 milliGRAM(s) Oral every 6 hours PRN Temp greater or equal to 38C (100.4F), Mild Pain (1 - 3)  aluminum hydroxide/magnesium hydroxide/simethicone Suspension 30 milliLiter(s) Oral every 4 hours PRN Dyspepsia  benzocaine/menthol Lozenge 1 Lozenge Oral every 8 hours PRN Sore Throat  melatonin 3 milliGRAM(s) Oral at bedtime PRN Insomnia  ondansetron Injectable 4 milliGRAM(s) IV Push every 8 hours PRN Nausea and/or Vomiting  polyethylene glycol 3350 17 Gram(s) Oral daily PRN Constipation    Vital Signs Last 24 Hrs  T(C): 36.4 (13 Feb 2023 11:20), Max: 38.1 (12 Feb 2023 14:35)  T(F): 97.6 (13 Feb 2023 11:20), Max: 100.6 (12 Feb 2023 14:35)  HR: 101 (13 Feb 2023 11:20) (96 - 122)  BP: 110/67 (13 Feb 2023 11:20) (99/50 - 120/73)  BP(mean): --  RR: 19 (13 Feb 2023 11:20) (18 - 19)  SpO2: 99% (13 Feb 2023 11:20) (88% - 99%)    Parameters below as of 13 Feb 2023 11:20  Patient On (Oxygen Delivery Method): nasal cannula  O2 Flow (L/min): 2      I&O's Summary    12 Feb 2023 07:01  -  13 Feb 2023 07:00  --------------------------------------------------------  IN: 56 mL / OUT: 0 mL / NET: 56 mL          Physical Exam:   GENERAL: NAD, well-groomed, well-developed  HEENT: RANJIT/   Atraumatic, Normocephalic  ENMT: No tonsillar erythema, exudates, or enlargement; Moist mucous membranes, Good dentition, No lesions  NECK: Supple, No JVD, Normal thyroid  CHEST/LUNG: Clear to auscultaion  CVS: Regular rate and rhythm; No murmurs, rubs, or gallops  GI: : Soft, Nontender, Nondistended; Bowel sounds present  NERVOUS SYSTEM:  awake:  looks pretty weak    EXTREMITIES:  2+ Peripheral Pulses, No clubbing, cyanosis, or edema  LYMPH: No lymphadenopathy noted  SKIN: No rashes or lesions  ENDOCRINOLOGY: No Thyromegaly  PSYCH: calm     Labs:                              6.4    66.95 )-----------( 7        ( 13 Feb 2023 09:22 )             19.7                         7.0    70.94 )-----------( 11       ( 12 Feb 2023 17:26 )             20.8                         7.2    62.29 )-----------( 18       ( 12 Feb 2023 07:05 )             21.9                         7.7    60.71 )-----------( 10       ( 11 Feb 2023 07:05 )             23.6                         7.6    54.66 )-----------( 30       ( 10 Feb 2023 06:50 )             24.1     02-13    138  |  103  |  32<H>  ----------------------------<  149<H>  4.2   |  21<L>  |  1.04  02-12    138  |  102  |  20  ----------------------------<  66<L>  3.3<L>   |  21<L>  |  0.83  02-11    136  |  103  |  17  ----------------------------<  80  3.9   |  18<L>  |  0.81  02-10    136  |  101  |  16  ----------------------------<  66<L>  3.3<L>   |  21<L>  |  0.73    Ca    8.6      13 Feb 2023 09:22  Ca    8.8      12 Feb 2023 07:04  Phos  3.8     02-13  Mg     1.9     02-13    TPro  5.9<L>  /  Alb  3.2<L>  /  TBili  1.0  /  DBili  x   /  AST  21  /  ALT  5<L>  /  AlkPhos  94  02-13  TPro  6.1  /  Alb  3.5  /  TBili  0.8  /  DBili  x   /  AST  15  /  ALT  6<L>  /  AlkPhos  78  02-12  TPro  6.0  /  Alb  3.1<L>  /  TBili  0.8  /  DBili  x   /  AST  19  /  ALT  5<L>  /  AlkPhos  74  02-11  TPro  6.0  /  Alb  3.2<L>  /  TBili  0.7  /  DBili  x   /  AST  17  /  ALT  6<L>  /  AlkPhos  69  02-10    CAPILLARY BLOOD GLUCOSE          LIVER FUNCTIONS - ( 13 Feb 2023 09:22 )  Alb: 3.2 g/dL / Pro: 5.9 g/dL / ALK PHOS: 94 U/L / ALT: 5 U/L / AST: 21 U/L / GGT: x                 rad< from: Xray Chest 1 View- PORTABLE-Urgent (Xray Chest 1 View- PORTABLE-Urgent .) (02.12.23 @ 15:32) >  CARLOS     PROCEDURE DATE:  02/12/2023          INTERPRETATION:  Chest one view    HISTORY: Fever    COMPARISON STUDY: 2/1/2023    Frontal expiratory view of the chest shows the heart to be normal in   size. Left chest port is present with catheter tip at the SVC right   atrial junction.    The lungs show clearing of the lungs with atelectasis or small residual   infiltrate of the lower right lung and there is no evidenceof   pneumothorax nor pleural effusion.    IMPRESSION:  Resolving infiltrates as noted.        Thank you for the courtesy of this referral.    --- End of Report ---    < end of copied text >      RECENT CULTURES:        RESPIRATORY CULTURES:          Studies  Chest X-RAY  CT SCAN Chest   Venous Dopplers: LE:   CT Abdomen  Others        rad< from: Xray Chest 1 View- PORTABLE-Urgent (Xray Chest 1 View- PORTABLE-Urgent .) (02.12.23 @ 15:32) >  The lungs show clearing of the lungs with atelectasis or small residual   infiltrate of the lower right lung and there is no evidenceof   pneumothorax nor pleural effusion.    IMPRESSION:  Resolving infiltrates as noted.        Thank you for the courtesy of this referral.    --- End of Report ---            ALEXA ZABALA MD; Attending Interventional Radiologist  This document has been electronically signed. Feb 13 2023 12:29PM    < end of copied text >

## 2023-02-13 NOTE — PROGRESS NOTE ADULT - ASSESSMENT
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 Hematology consulted for further work up.        # New diagnosis MDS with EB2    - CBC at admission with anemia of 5.9; platelet count of 68, wbc of 45.18. Review of records, patient with anemia since at least october 2022, however thrombocytopenia and leukocytosis is new.    - Received pRBC transfusion and responded appropriately, platelets downtrending ~30k   - Iron panel consistent AOCD; Ferritin elevated at 5768; B12 elevated, folate>20    - CT C/A/P with contrast without signs of LAD however, Multiple ill-defined opacities are noted within both lungs, more so in the right lower lobe. Exact etiology is unclear. B/L pleural effusions R>L   - RVP/covid negative,    - peripheral smear reviewed by hematology team during the initial consultation after this admission: no blasts, but immature WBCs noted, no schistocytes, thrombocytopenia noted    - Bone marrow biopsy 1/23/2023; Pathology consistent with MDS- EB 10-15% blasts .   - Hematology team discussed with her hematologist Dr. Madrigal at [facility]: plan is for single agent HMA for now; once follows up post rehab, can consider add on Dave. Rheumatology still considering rituximab; the initial Plan was to give Rituximab then HMA (azacytidine vs dacogen) once ID clears.   -hematology team communicated with Dr. Madrigal and recommended starting HMA Dacogen (decitabine) x 5 days; planning to start Day 1 on 2/10/23.  -Chemo consent signed by pt and put in the chart. hematology team called pt's son at [place]  [phone] to discuss the situation, and her son understands and agrees with the treatment plan.   - bacteremia found on 1/31 blood culx (Staph epidermitis with methicillin resistance)  s/p meropenem and Vanco; now on nitrofurantion until 2/9   -left side Epistaxis found and ENT f/u, nostril pack removed 2/4 per note ; transfusions for plts <10 if afebrile, <15 if febrile, 50 if bleeding, hbg <7  -Transfuse today pRBC and plt  - Please monitor CBC with DIFF and TLS labs (CMP, Mg, Phos, LDH, uric acid) daily       # Hx of DLBCL EBV+   - Follows with Dr Madrigal, s/p R-mini-chop in 2021; Recent bone marrow biopsy for new anemia in Nov /2022 did not show any disease recurrence.    - repeat imaging this admission without signs of LAD   - Decitabine 20mg/m2 daily, Day 4/5  - Monitor TLS labs (CMP, Mg, Phos, LDH, uric acid) daily. If UA>5 and less 8 can start allopurinol 300mg daily. If UA>8 can give rasburicase 3mg once +IVF        # ANCA vasculitis:    - Followed by rheum    - On chronic steroids - 20mg prednisone; on bactrim DS 1 tab daily for ppx  -No contraindication to rituximab     Please page with questions or concerns. Will Follow with you.      Stanislav Holland M.D.  Hematology/Oncology Fellow PGY5  Pager [phone]  After 5pm, please contact on-call team.

## 2023-02-13 NOTE — PROGRESS NOTE ADULT - SUBJECTIVE AND OBJECTIVE BOX
No N/V  No diarrhea    Vital Signs Last 24 Hrs  T(C): 36.4 (13 Feb 2023 11:20), Max: 38.1 (12 Feb 2023 14:35)  T(F): 97.6 (13 Feb 2023 11:20), Max: 100.6 (12 Feb 2023 14:35)  HR: 101 (13 Feb 2023 11:20) (96 - 122)  BP: 110/67 (13 Feb 2023 11:20) (99/50 - 120/73)  BP(mean): --  RR: 19 (13 Feb 2023 11:20) (18 - 19)  SpO2: 99% (13 Feb 2023 11:20) (88% - 99%)    I&O's Summary    02-12-23 @ 07:01  -  02-13-23 @ 07:00  --------------------------------------------------------  IN: 56 mL / OUT: 0 mL / NET: 56 mL        PHYSICAL EXAM:  GENERAL: NAD, well-developed, comfortable on room air  HEAD:  Atraumatic, Normocephalic  EYES: EOMI, PERRLA, conjunctiva and sclera clear  NECK: Supple, No JVD  CHEST/LUNG: mild decrease breath sounds bilaterally; No wheeze   HEART: Regular rate and rhythm; No murmurs, rubs, or gallops  ABDOMEN: Soft, Nontender, Nondistended; Bowel sounds present  Neuro: awake alert, back to baseline mental status. no focal weakness  EXTREMITIES:  2+ Peripheral Pulses, No clubbing, cyanosis, trace b/l edema  SKIN: superficial ecchymoses.     LABS:                        6.4    66.95 )-----------( 7        ( 13 Feb 2023 09:22 )             19.7     02-13    138  |  103  |  32<H>  ----------------------------<  149<H>  4.2   |  21<L>  |  1.04    Ca    8.6      13 Feb 2023 09:22  Phos  3.8     02-13  Mg     1.9     02-13    TPro  5.9<L>  /  Alb  3.2<L>  /  TBili  1.0  /  DBili  x   /  AST  21  /  ALT  5<L>  /  AlkPhos  94  02-13      CAPILLARY BLOOD GLUCOSE                RADIOLOGY & ADDITIONAL TESTS:    Imaging Personally Reviewed:  [x] YES  [ ] NO    Case discussed with NPP:  [X] YES  [ ] NO

## 2023-02-13 NOTE — ADVANCED PRACTICE NURSE CONSULT - ASSESSMENT
Pt vital signs stable. Pt on day 4/5 tx cycle 1, labs noted in sunrise, height, weight and bsa verified, okay to proceed with tx as per doctor Popeye. Pt has a left chest wall port with + blood return noted, no pain, redness or swelling noted at site.  Pt premedicated as noted in sunrise.  Pt administered Decitabine 20 mg/m2 = 30 mg iv over 1 hour via locked pump.  Pt re-educated on chemo regimen and signs and symptoms to report to area nurse and staff, pt verbalized understanding. Emotional support provided.  Report given to primary area nurse.

## 2023-02-13 NOTE — PROGRESS NOTE ADULT - ASSESSMENT
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 Reports chronic cough, currently nonproductive - RVP/COVID negative. Has multiple episodes of loose stools x weeks, reported recently trying marijuana for appetite and noted worsening. She was given oral vancomycin empirically for C.diff but then became constipated, s/p bowel regimen and having normal bowel movements. Patient initially being monitored off antibiotics given she was afebrile, WBC was stable and no infectious source was found. She had a fever 100.9F on 1/30 overnight with worsening leukocytosis and then 101.4F overnight 1/31 and noted with confusion and found to have sepsis due to GP bacteremia.   H/o PCN allergy with hives    Fever   - febrile x1 to 100.6F yesterday - pt denies feeling fever/chills   - afebrile overnight, WBC trend noted  - RVP/COVID negative   - CXR imaging reviewed - no focal infiltrate/consolidation seen  - L chest port accessed with no s/o infection  - no new focal findings on exam  - follow blood cultures - in process x2  - can continue to monitor off abx other than bactrim ppx  - monitor temps/CBC    Sepsis due to gram positive bacteremia, cleared and treated    E. faecalis bacteremia - unclear source, ?GI, less likely    Staph epi bacteremia ?skin vs port source vs contamination  AMS - neurotoxicity d/t cefepime vs infectious etiology   --d/c'ed cefepime 1/31, mental status improved   - 1/30 Bcx (1 set sent) with Staph epi - MRSE -- sensitivities noted   - 1/31 Bcx (1 set sent) - aerobic bottle grew E. faecalis (amp/vanc sensitive) and MRSE  - 2/1 repeat Bcx -- Negative x2   - TTE w/o mention of vegetations  - s/p vancomycin-completed 10d course 2/10    UTI with E. 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  [facility], Division of Infectious Diseases  [phone]  After 5pm on weekdays and all day on weekends - please call [phone]

## 2023-02-13 NOTE — PROGRESS NOTE ADULT - ASSESSMENT
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 She presented here from [email or web address] for elevated WBC and low hemoglobin, severe weakness. Pt states for the past 2-3 days has been having increased weakness and lethargy, decreased appetite. +sputum productive cough. + cui, no sob, no difficulty breathing. No abdominal pain, nausea, vomiting, no bloody stools. Has endorsed multiple episodes of loose stools x weeks, currently being treated for c.diff with oral vancomycin.       Fatigue/dyspnea: likely due to severe anemia :  pt s/p 1 unit PRB  Diarrhea  Pulmonary vasculitis  :  hx of Tachycardia :  Recent TTE on 11/3/22 reviewed: normal LV. outpt card Dr. Ady Cooper  Anxiety and depression  GERD (gastroesophageal reflux disease)  Hyperlipidemia.   DVT ppx     1/20:  Fatigue/dyspnea: likely due to severe anemia :  pt s/p 1 unit PRBC: hb now  > 10  : she is on 2 L of oxygen : no wheezing: no overt signs of bleeding : ct chest is abnormal report noted:  has jean-claude ill defined opacities of unclear etiology  : venous ph is mild acidotic:  no need for Bipap at this time : monitor and trend hb  Diarrhea : symptomatic rx:  workup per primary team  Pulmonary vasculitis  : per rheumatology  : had recent vats surgery : LN biopsy noted:   hx of Tachycardia :  Recent TTE on 11/3/22 reviewed: normal LV. outpt card Dr. Ady Cooper  Anxiety and depression  GERD (gastroesophageal reflux disease) : ppi   Hyperlipidemia.   recent covid  : o n last admission she was treated for covid infection:   DVT ppx   d wteam    1/22:    Fatigue/dyspnea: likely due to severe anemia :  pt s/p 1 unit PRBC: hb now  > 10  : she is on 2 L of oxygen : no wheezing: no overt signs of bleeding : ct chest is abnormal report noted:  has jean-claude ill defined opacities of unclear etiology  : venous ph is mild acidotic:  no need for Bipap at this time : monitor and trend hb: she remained stable o gracie last 1 days:  she is not on any antibtiocs at this time: RVP  and blood cultures are negative: she had ebus biopsy also before: reviewed: ID following  Diarrhea : symptomatic rx:  workup per primary team : seems to have resolved  Pulmonary vasculitis  : per rheumatology  : had recent vats surgery : LN biopsy noted:   Bicytopneia and leucocytosis: ? etiology  : for possible bone marrow biopsy on Monday    hx of Tachycardia :  Recent TTE on 11/3/22 reviewed: normal LV. outpt card Dr. Ady Cooper  Anxiety and depression  GERD (gastroesophageal reflux disease) : ppi   Hyperlipidemia.   recent covid  : on last admission she was treated for covid infection:   DVT ppx   dw team    1/23:    Fatigue/dyspnea: likely due to severe anemia : feels weak  but no bleeding noted:  on 2 L of oxygen : she needs PT OT   Diarrhea :resolved:   Pulmonary vasculitis  : per rheumatology  : had recent vats surgery : LN biopsy noted: : she never received teat ment for vasculitis except low dose steroids:  she is awaiting bone marrow biopsy prior to induction therapy with rituximab: The ct chest done on this admission does not show pneumothorax and has jean-claude effusions:  There are some new opacites in rigth lower lobe which were not therre:  clinically she does not seem to have pneumonia: ID following: could it be a prt of vasculitis  Bicytopneia and leucocytosis: ? etiology  : for possible bone marrow biopsy today  hx of Tachycardia :  Recent TTE on 11/3/22 reviewed: normal LV. outpt card Dr. Ady Cooper  Anxiety and depression  GERD (gastroesophageal reflux disease) : ppi   Hyperlipidemia.   recent covid  : on last admission she was treated for covid infection:   DVT ppx   dw team    1/24:    Fatigue/dyspnea: likely due to severe anemia : feels weak  but no bleeding noted:  on 2 L of oxygen : she needs PT OT   Diarrhea :resolved:   Pulmonary vasculitis  : per rheumatology  : had recent vats surgery : LN biopsy noted: : she never received teat ment for vasculitis except low dose steroids:  she is awaiting bone marrow biopsy prior to induction therapy with rituximab: The ct chest done on this admission does not show pneumothorax and has jean-claude effusions:  There are some new opacites in rigth lower lobe which were not therre:  clinically she does not seem to have pneumonia: ID following: could it be a prt of vasculitis  Bicytopneia and leucocytosis: BM biopsy done: await results for eventual immunosuppressives therapy:   hx of Tachycardia :  Recent TTE on 11/3/22 reviewed: normal LV. outpt card Dr. Ady Cooper  Anxiety and depression  GERD (gastroesophageal reflux disease) : ppi   Hyperlipidemia.   recent covid  : on last admission she was treated for covid infection:   DVT ppx   dw team    1/25:    Fatigue/dyspnea: likely due to severe anemia : feels weak  but no bleeding noted:  on 2 L of oxygen : she needs PT OT   Diarrhea :resolved:   Pulmonary vasculitis  : per rheumatology  : had recent vats surgery : LN biopsy noted: : she never received teat ment for vasculitis except low dose steroids:  she is awaiting bone marrow biopsy prior to induction therapy with rituximab: The ct chest done on this admission does not show pneumothorax and has jean-claude effusions:  There are some new opacites in rigth lower lobe which were not there:  clinically she does not seem to have pneumonia: ID following: could it be a part of vasculitis : her resp status has not changed   Bicytopneia and leucocytosis: BM biopsy done: await results for still pending   hx of Tachycardia :  Recent TTE on 11/3/22 reviewed: normal LV. outpt card Dr. Ady Cooper  Anxiety and depression  GERD (gastroesophageal reflux disease) : ppi   Hyperlipidemia.   recent covid  : on last admission she was treated for covid infection:   DVT ppx   dw team: awaiting decision on immunosuppression     1/26;      Fatigue/dyspnea: likely due to severe anemia : feels weak  but no bleeding noted:  on 2 L of oxygen : she needs PT OT : got it yesterday    Diarrhea :resolved:   Pulmonary vasculitis  : per rheumatology  : had recent vats surgery : LN biopsy noted: : she never received teat ment for vasculitis except low dose steroids:  she is awaiting bone marrow biopsy prior to induction therapy with rituximab: The ct chest done on this admission does not show pneumothorax and has jean-claude effusions:  There are some new opacities in rigth lower lobe which were not there:  clinically she does not seem to have pneumonia: ID following: could it be a part of vasculitis : her resp status has not changed  : being observed off antibiotics   Bicytopneia and leucocytosis: BM biopsy done: await results for still pending   hx of Tachycardia :  Recent TTE on 11/3/22 reviewed: normal LV. outpt card Dr. Ady Cooper  Anxiety and depression  GERD (gastroesophageal reflux disease) : ppi   Hyperlipidemia.   recent covid  : on last admission she was treated for covid infection:   DVT ppx   dw team: awaiting decision on immunosuppression     1/27:    Fatigue/dyspnea: likely due to severe anemia : feels weak  but no bleeding noted:  on 2 L of oxygen : cont  PT OT :   Diarrhea :resolved:   Pulmonary vasculitis  : per rheumatology  : had recent vats surgery : LN biopsy noted: : she never received teat ment for vasculitis except low dose steroids:  she is awaiting bone marrow biopsy prior to induction therapy with rituximab: The ct chest done on this admission does not show pneumothorax and has jean-claude effusions:  There are some new opacities in rigth lower lobe which were not there:  clinically she does not seem to have pneumonia: ID following: could it be a part of vasculitis : her resp status has not changed  : being observed off antibiotics :  on bactrim prophylaxis as she is on chr steroids   Bicytopneia and leucocytosis: BM biopsy done: await results for still pending   hx of Tachycardia :  Recent TTE on 11/3/22 reviewed: normal LV. outpt card Dr. Ady Cooper  Anxiety and depression  GERD (gastroesophageal reflux disease) : ppi   Hyperlipidemia.   recent covid  : on last admission she was treated for covid infection:   DVT ppx   dw team: awaiting decision on immunosuppression     1/29:    Fatigue/dyspnea: likely due to severe anemia : feels weak  but no bleeding noted:  on 2 L of oxygen : cont  PT OT :   Diarrhea :resolved:   Pulmonary vasculitis  : per rheumatology  : had recent vats surgery : LN biopsy noted: : she never received treat ment for vasculitis except low dose steroids:  she is awaiting bone marrow biopsy prior to induction therapy with rituximab: The ct chest done on this admission does not show pneumothorax and has jean-claude effusions:  There are some new opacities in rigth lower lobe which were not there:  clinically she does not seem to have pneumonia: ID following: could it be a part of vasculitis : her resp status has not changed  : being observed off antibiotics :  on bactrim prophylaxis as she is on chr steroids   Bicytopneia and leucocytosis: BM biopsy: results reviewed defer to [facility]  hx of Tachycardia :  Recent TTE on 11/3/22 reviewed: normal LV.   Anxiety and depression  GERD (gastroesophageal reflux disease) : ppi   Hyperlipidemia.   recent covid  : on last admission she was treated for covid infection:   DVT ppx   dw team: awaiting decision on immunosuppression     1/30:    Fatigue/dyspnea: likely due to severe anemia : feels weak  but no bleeding noted:  on 2 L of oxygen : cont  PT OT : sitting in chair today   Diarrhea :resolved:   Pulmonary vasculitis  : per rheumatology  : had recent vats surgery : LN biopsy noted: : she never received treat ment for vasculitis except low dose steroids:  she is awaiting bone marrow biopsy prior to induction therapy with rituximab: The ct chest done on this admission does not show pneumothorax and has jean-claude effusions:  There are some new opacities in rigth lower lobe which were not there:  clinically she does not seem to have pneumonia: ID following: could it be a part of vasculitis : her resp status has not changed  : being observed off antibiotics :  on bactrim prophylaxis as she is on chr steroids  /l however she had low grade tempt today : rvp is negative : cultures sent: ID follow up   Bicytopneia and leucocytosis: BM biopsy: results reviewed defer to [facility]  hx of Tachycardia :  Recent TTE on 11/3/22 reviewed: normal LV.   Anxiety and depression  GERD (gastroesophageal reflux disease) : ppi   Hyperlipidemia.   recent covid  : on last admission she was treated for covid infection:   DVT ppx   dw team: awaiting decision on immunosuppression     1/31:    Fatigue/dyspnea: likely due to severe anemia : feels weak  but no bleeding noted:  on 2 L of oxygen : cont  PT OT : lying in bed:   Diarrhea :resolved:   Pulmonary vasculitis  : per rheumatology  : had recent vats surgery : LN biopsy noted: : she never received treat ment for vasculitis except low dose steroids:  she is awaiting bone marrow biopsy prior to induction therapy with rituximab: The ct chest done on this admission does not show pneumothorax and has jean-claude effusions:  There are some new opacities in rigth lower lobe which were not there:  clinically she does not seem to have pneumonia: ID following: could it be a part of vasculitis : her resp status has not changed  : being observed off antibiotics :  on bactrim prophylaxis as she is on chr steroids : she seems OK:  no sob:     Bicytopneia and leucocytosis: BM biopsy: results reviewed defer to [facility] ; for eventual chemo   hx of Tachycardia :  Recent TTE on 11/3/22 reviewed: normal LV.   Anxiety and depression  GERD (gastroesophageal reflux disease) : ppi   Hyperlipidemia.   recent covid  : on last admission she was treated for covid infection:   DVT ppx   dw team: awaiting decision on immunosuppression     2/1:    Fatigue/dyspnea: likely due to severe anemia : feels weak  but no bleeding noted:  on 2 L of oxygen : cont  PT OT : lying in bed:  her blood cultures are contaminant:   Diarrhea :resolved:   Pulmonary vasculitis  : per rheumatology  : had recent vats surgery : LN biopsy noted: : she never received treat ment for vasculitis except low dose steroids:  she is awaiting bone marrow biopsy prior to induction therapy with rituximab: The ct chest done on this admission does not show pneumothorax and has jean-claude effusions:  There are some new opacities in rigth lower lobe which were not there:  clinically she does not seem to have pneumonia: ID following: could it be a part of vasculitis : her resp status has not changed  : being observed off antibiotics :  on bactrim prophylaxis as she is on chr steroids : she seems OK:  no sob:     Bicytopneia and leucocytosis: BM biopsy: results reviewed defer to [facility] ; for eventual chemo   hx of Tachycardia :  Recent TTE on 11/3/22 reviewed: normal LV.   Anxiety and depression  GERD (gastroesophageal reflux disease) : ppi   Hyperlipidemia.   recent covid  : on last admission she was treated for covid infection:   DVT ppx   dw team: awaiting decision on immunosuppression: overall her general condition seems very poor and very weak:     2/2:    Fatigue/dyspnea: likely due to severe anemia : feels weak  but no bleeding noted:  on 2 L of oxygen : cont  PT OT : lying in bed:  her blood cultures are positive E FAECALIS :  ON MEROPENEM  Diarrhea :resolved:   Pulmonary vasculitis  : per rheumatology  : had recent vats surgery : LN biopsy noted: : she never received treat ment for vasculitis except low dose steroids:  she is awaiting bone marrow biopsy prior to induction therapy with rituximab: The ct chest done on this admission does not show pneumothorax and has jean-claude effusions:  There are some new opacities in rigth lower lobe which were not there:  clinically she does not seem to have pneumonia: ID following: could it be a part of vasculitis : her resp status has not changed  : being observed off antibiotics :  on bactrim prophylaxis as she is on chr steroids : she seems OK:  no sob:     Bicytopneia and leucocytosis: BM biopsy: results reviewed defer to [facility] ; for eventual chemo   hx of Tachycardia :  Recent TTE on 11/3/22 reviewed: normal LV.   Anxiety and depression  GERD (gastroesophageal reflux disease) : ppi   Hyperlipidemia.   recent covid  : on last admission she was treated for covid infection:   DVT ppx   dw team: awaiting decision on immunosuppression: overall her general condition seems very poor and very weak: ON ANTIBIOTICS     2/3   Pulmonary Vasculitis  -Steroids per rheum  -Plan for eventual Rituxan  -Bactrim for PCP ppx  MDS  -Per heme/onc  Bacteremia  -E. Faecalis bacteremia  -ABX per ID  Epistaxis  -Per ENT  -Breathing comfortably on 40% humidified face tent, keep sats >90%    2/6:    2/6  Pulmonary Vasculitis  -Steroids per rheum  -Plan for eventual Rituxan /l she is on room air at this time  -Bactrim for PCP ppx  MDS  -Per heme/onc  Bacteremia  -E. Faecalis bacteremia  -ABX per ID  Epistaxis  -Per ENT  - she is on room air today  : cont to mantain o2 sao2 above 90% all the ti me:     acp      2/7:  Pulmonary Vasculitis  -Steroids per rheum  -Plan for eventual Rituxan /l she is on room air at this time  -Bactrim for PCP ppx  MDS  -Per heme/onc  Bacteremia  -E. Faecalis bacteremia  -ABX per ID : awaiting clearance from id for chemo : last blood cultures have been negative  Epistaxis  -Per ENT  - she is on room air today  : cont to mantain o2 sao2 above 90% all the ti me:     acp    2/8:  Pulmonary Vasculitis  -Steroids per rheum  -Plan for eventual Rituxan /l she is on room air at this time  -Bactrim for PCP ppx  MDS  -Per heme/onc  Bacteremia  -E. Faecalis bacteremia  -ABX per ID : awaiting clearance from id for chemo : last blood cultures have been negative : finishing antbiotics today  :  Epistaxis  -Per ENT  - she is on room air today  : cont to mantain o2 sao2 above 90% all the ti me:  Thrombocytopenia:  sign today  : 14k: no obvious bleeding: cont to monitorial : transfuse per [facility]mo[facility]     acp      2/9:    Pulmonary Vasculitis : Steroids per rheum : Plan for eventual Rituxan /l she is on room air at this time : Bactrim for PCP ppx : on room air: with no change in her resp status  MDS : s'p BM biopsy : has MDS : defer to hemonc  Bacteremia E. Faecalis bacteremia  -ABX per ID : awaiting clearance from id for chemo : last blood cultures have been negative : finished antibiotics   Epistaxis Per ENT : she is on room air today  : cont to mantain o2 sao2 above 90% all the ti me:  Thrombocytopenia:  sign today  : 10k: no obvious bleeding: cont to monitorial : transfuse per [facility] :? for plt transfusion  today      dw acp    2/10:  Pulmonary Vasculitis : Steroids per rheum : Plan for eventual Rituxan /l she is on room air at this time : Bactrim for PCP ppx : on room air: with no change in her resp status : now plan for rituximab aftger 5 days of dacogen   MDS : s'p BM biopsy : has MDS : defer to hemon[facility]  Bacteremia E. Faecalis bacteremia  -ABX per ID : awaiting clearance from id for chemo : last blood cultures have been negative : finished antibiotics   Epistaxis Per ENT : she is on room air today  : cont to mantain o2 sao2 above 90% all the ti me:  Thrombocytopenia:  still low, but much better,  no obvious bleeding: cont to monitorial :  dw acp    2/12:  Pulmonary Vasculitis : Steroids per rheum : Plan for eventual Rituxan /l she is on room air at this time : Bactrim for PCP ppx : on room air: with no change in her resp status : now plan for rituximab after 5 days of dacogen : today is day 3 : doing  ok : no resp idstress  MDS : s'p BM biopsy : has MDS : defer to hemonc  Bacteremia E. Faecalis bacteremia  -ABX per ID : awaiting clearance from id for chemo : last blood cultures have been negative : finished antibiotics   Epistaxis Per ENT : she is on room air today  : cont to mantain o2 sao2 above 90% all the ti me:  Thrombocytopenia:  still low, but much better,  no obvious bleeding: cont to monitor :  dw acp      2/13:  Low grade temperature : pancultured:  chest xray with improvement in infiltrates : id following : no antibiotics yet:   Pulmonary Vasculitis : Steroids per rheum : Plan for eventual Rituxan.  she is on room air at this time : Bactrim for PCP ppx : on 2 L of oxygen today : rpt chest xray noted:   MDS : s'p BM biopsy : has MDS : defer to hemonc  Bacteremia E. Faecalis bacteremia  -ABX per ID : awaiting clearance from id for chemo : last blood cultures have been negative : finished antibiotics   Epistaxis Per ENT : resolved  Thrombocytopenia:  for transfusion today    dw acp : pt looks pretty weak and cachectic:

## 2023-02-13 NOTE — PROGRESS NOTE ADULT - ASSESSMENT
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 She presented here from [facility] for elevated WBC and low hemoglobin, severe weakness. Pt states for the past 2-3 days has been having increased weakness and lethargy, decreased appetite. +sputum productive cough. + cui, no sob, no difficulty breathing. No abdominal pain, nausea, vomiting, no bloody stools. Has endorsed multiple episodes of loose stools x weeks, currently being treated for c.diff with oral vancomycin. Nephrology consulted for Hyponatremia.       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

## 2023-02-13 NOTE — PROGRESS NOTE ADULT - SUBJECTIVE AND OBJECTIVE BOX
Hematology/Oncology Follow-up    INTERVAL HPI/OVERNIGHT EVENTS:  Patient S&E at bedside. No o/n events, patient resting comfortably. No complaints at this time. Patient denies fever, chills, dizziness, weakness, CP, palpitations, SOB, cough, N/V/D/C, dysuria, changes in bowel movements, LE edema.    VITAL SIGNS:  T(F): 97.2 (02-13-23 @ 13:22)  HR: 104 (02-13-23 @ 13:22)  BP: 129/70 (02-13-23 @ 13:22)  RR: 18 (02-13-23 @ 13:22)  SpO2: 97% (02-13-23 @ 13:22)  Wt(kg): --    PHYSICAL EXAM:    Constitutional: AAOx3, NAD,   Eyes: PERRL, EOMI, sclera non-icteric  Neck: supple, no masses, no JVD  Respiratory: CTA b/l, good air entry b/l, no wheezing, rhonchi, rales, with normal respiratory effort and no intercostal retractions  Cardiovascular: RRR, normal S1S2, no M/R/G  Gastrointestinal: soft, NTND, no masses palpable, BS normal in all four quadrants, no HSM  Extremities:  no c/c/e  Neurological: Grossly intact  Skin: ecchymosis throughout     MEDICATIONS  (STANDING):  Biotene Dry Mouth Oral Rinse 15 milliLiter(s) Swish and Spit four times a day  budesonide 160 MICROgram(s)/formoterol 4.5 MICROgram(s) Inhaler 2 Puff(s) Inhalation two times a day  chlorhexidine 2% Cloths 1 Application(s) Topical daily  cholecalciferol 2000 Unit(s) Oral daily  citalopram 10 milliGRAM(s) Oral daily  decitabine IVPB (eMAR) 30 milliGRAM(s) IV Intermittent every 24 hours  FIRST- Mouthwash  BLM 10 milliLiter(s) Swish and Spit four times a day  fluticasone propionate 50 MICROgram(s)/spray Nasal Spray 1 Spray(s) Both Nostrils two times a day  folic acid 1 milliGRAM(s) Oral daily  guaiFENesin  milliGRAM(s) Oral every 12 hours  influenza  Vaccine (HIGH DOSE) 0.7 milliLiter(s) IntraMuscular once  ipratropium    for Nebulization 500 MICROGram(s) Nebulizer every 6 hours  lactobacillus acidophilus 1 Tablet(s) Oral daily  metoprolol tartrate 25 milliGRAM(s) Oral two times a day  mirtazapine 7.5 milliGRAM(s) Oral daily  ondansetron Injectable 8 milliGRAM(s) IV Push every 24 hours  pantoprazole    Tablet 40 milliGRAM(s) Oral before breakfast  predniSONE   Tablet 20 milliGRAM(s) Oral daily  senna 1 Tablet(s) Oral daily  sodium chloride 0.65% Nasal 2 Spray(s) Both Nostrils every 6 hours  trimethoprim  160 mG/sulfamethoxazole 800 mG 1 Tablet(s) Oral daily    MEDICATIONS  (PRN):  acetaminophen     Tablet .. 650 milliGRAM(s) Oral every 6 hours PRN Temp greater or equal to 38C (100.4F), Mild Pain (1 - 3)  aluminum hydroxide/magnesium hydroxide/simethicone Suspension 30 milliLiter(s) Oral every 4 hours PRN Dyspepsia  benzocaine/menthol Lozenge 1 Lozenge Oral every 8 hours PRN Sore Throat  melatonin 3 milliGRAM(s) Oral at bedtime PRN Insomnia  ondansetron Injectable 4 milliGRAM(s) IV Push every 8 hours PRN Nausea and/or Vomiting  polyethylene glycol 3350 17 Gram(s) Oral daily PRN Constipation      codeine (Nausea)  doxycycline (Nausea)  penicillin (Hives)      LABS:                        6.4    66.95 )-----------( 7        ( 13 Feb 2023 09:22 )             19.7     02-13    138  |  103  |  32<H>  ----------------------------<  149<H>  4.2   |  21<L>  |  1.04    Ca    8.6      13 Feb 2023 09:22  Phos  3.8     02-13  Mg     1.9     02-13    TPro  5.9<L>  /  Alb  3.2<L>  /  TBili  1.0  /  DBili  x   /  AST  21  /  ALT  5<L>  /  AlkPhos  94  02-13     Lactate Dehydrogenase, Serum: 930 U/L (02-13 @ 09:22)        RADIOLOGY & ADDITIONAL TESTS:  Studies reviewed.

## 2023-02-14 NOTE — PROGRESS NOTE ADULT - ASSESSMENT
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 Reports chronic cough, currently nonproductive - RVP/COVID negative. Has multiple episodes of loose stools x weeks, reported recently trying marijuana for appetite and noted worsening. She was given oral vancomycin empirically for C.diff but then became constipated, s/p bowel regimen and having normal bowel movements. Patient initially being monitored off antibiotics given she was afebrile, WBC was stable and no infectious source was found. She had a fever 100.9F on 1/30 overnight with worsening leukocytosis and then 101.4F overnight 1/31 and noted with confusion and found to have sepsis due to GP bacteremia.   H/o PCN allergy with hives    Fever   - febrile x1 2/12, pt asx, no further fevers, WBC trend noted  - RVP/COVID negative   - CXR with no focal infiltrate/consolidation seen  - L chest port accessed with no s/o infection  - no new focal findings on exam  - blood cultures NGTD x2  - continue to monitor off abx other than bactrim ppx  - monitor temps/CBC    Sepsis due to gram positive bacteremia, cleared and treated    E. faecalis bacteremia - unclear source, ?GI, less likely    Staph epi bacteremia ?skin vs port source vs contamination  AMS - neurotoxicity d/t cefepime vs infectious etiology   --d/c'ed cefepime 1/31, mental status improved   - 1/30 Bcx (1 set sent) with Staph epi - MRSE -- sensitivities noted   - 1/31 Bcx (1 set sent) - aerobic bottle grew E. faecalis (amp/vanc sensitive) and MRSE  - 2/1 repeat Bcx -- Negative x2   - TTE w/o mention of vegetations  - s/p vancomycin-completed 10d course 2/10    UTI with E. 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  [facility], Division of Infectious Diseases  [phone]  After 5pm on weekdays and all day on weekends - please call [phone]

## 2023-02-14 NOTE — PROGRESS NOTE ADULT - ASSESSMENT
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 She presented here from [facility] for elevated WBC and low hemoglobin, severe weakness. Pt states for the past 2-3 days has been having increased weakness and lethargy, decreased appetite. +sputum productive cough. + cui, no sob, no difficulty breathing. No abdominal pain, nausea, vomiting, no bloody stools. Has endorsed multiple episodes of loose stools x weeks, currently being treated for c.diff with oral vancomycin.       Fatigue/dyspnea: likely due to severe anemia :  pt s/p 1 unit PRB  Diarrhea  Pulmonary vasculitis  :  hx of Tachycardia :  Recent TTE on 11/3/22 reviewed: normal LV. outpt card Dr. Ady Cooper  Anxiety and depression  GERD (gastroesophageal reflux disease)  Hyperlipidemia.   DVT ppx     1/20:  Fatigue/dyspnea: likely due to severe anemia :  pt s/p 1 unit PRBC: hb now  > 10  : she is on 2 L of oxygen : no wheezing: no overt signs of bleeding : ct chest is abnormal report noted:  has jean-claude ill defined opacities of unclear etiology  : venous ph is mild acidotic:  no need for Bipap at this time : monitor and trend hb  Diarrhea : symptomatic rx:  workup per primary team  Pulmonary vasculitis  : per rheumatology  : had recent vats surgery : LN biopsy noted:   hx of Tachycardia :  Recent TTE on 11/3/22 reviewed: normal LV. outpt card Dr. Ady Cooper  Anxiety and depression  GERD (gastroesophageal reflux disease) : ppi   Hyperlipidemia.   recent covid  : o n last admission she was treated for covid infection:   DVT ppx   d wteam    1/22:    Fatigue/dyspnea: likely due to severe anemia :  pt s/p 1 unit PRBC: hb now  > 10  : she is on 2 L of oxygen : no wheezing: no overt signs of bleeding : ct chest is abnormal report noted:  has jean-claude ill defined opacities of unclear etiology  : venous ph is mild acidotic:  no need for Bipap at this time : monitor and trend hb: she remained stable o gracie last 1 days:  she is not on any antibtiocs at this time: RVP  and blood cultures are negative: she had ebus biopsy also before: reviewed: ID following  Diarrhea : symptomatic rx:  workup per primary team : seems to have resolved  Pulmonary vasculitis  : per rheumatology  : had recent vats surgery : LN biopsy noted:   Bicytopneia and leucocytosis: ? etiology  : for possible bone marrow biopsy on Monday    hx of Tachycardia :  Recent TTE on 11/3/22 reviewed: normal LV. outpt card Dr. Ady Cooper  Anxiety and depression  GERD (gastroesophageal reflux disease) : ppi   Hyperlipidemia.   recent covid  : on last admission she was treated for covid infection:   DVT ppx   dw team    1/23:    Fatigue/dyspnea: likely due to severe anemia : feels weak  but no bleeding noted:  on 2 L of oxygen : she needs PT OT   Diarrhea :resolved:   Pulmonary vasculitis  : per rheumatology  : had recent vats surgery : LN biopsy noted: : she never received teat ment for vasculitis except low dose steroids:  she is awaiting bone marrow biopsy prior to induction therapy with rituximab: The ct chest done on this admission does not show pneumothorax and has jean-claude effusions:  There are some new opacites in rigth lower lobe which were not therre:  clinically she does not seem to have pneumonia: ID following: could it be a prt of vasculitis  Bicytopneia and leucocytosis: ? etiology  : for possible bone marrow biopsy today  hx of Tachycardia :  Recent TTE on 11/3/22 reviewed: normal LV. outpt card Dr. Ady Cooper  Anxiety and depression  GERD (gastroesophageal reflux disease) : ppi   Hyperlipidemia.   recent covid  : on last admission she was treated for covid infection:   DVT ppx   dw team    1/24:    Fatigue/dyspnea: likely due to severe anemia : feels weak  but no bleeding noted:  on 2 L of oxygen : she needs PT OT   Diarrhea :resolved:   Pulmonary vasculitis  : per rheumatology  : had recent vats surgery : LN biopsy noted: : she never received teat ment for vasculitis except low dose steroids:  she is awaiting bone marrow biopsy prior to induction therapy with rituximab: The ct chest done on this admission does not show pneumothorax and has jean-claude effusions:  There are some new opacites in rigth lower lobe which were not therre:  clinically she does not seem to have pneumonia: ID following: could it be a prt of vasculitis  Bicytopneia and leucocytosis: BM biopsy done: await results for eventual immunosuppressives therapy:   hx of Tachycardia :  Recent TTE on 11/3/22 reviewed: normal LV. outpt card Dr. Ady Cooper  Anxiety and depression  GERD (gastroesophageal reflux disease) : ppi   Hyperlipidemia.   recent covid  : on last admission she was treated for covid infection:   DVT ppx   dw team    1/25:    Fatigue/dyspnea: likely due to severe anemia : feels weak  but no bleeding noted:  on 2 L of oxygen : she needs PT OT   Diarrhea :resolved:   Pulmonary vasculitis  : per rheumatology  : had recent vats surgery : LN biopsy noted: : she never received teat ment for vasculitis except low dose steroids:  she is awaiting bone marrow biopsy prior to induction therapy with rituximab: The ct chest done on this admission does not show pneumothorax and has jean-claude effusions:  There are some new opacites in rigth lower lobe which were not there:  clinically she does not seem to have pneumonia: ID following: could it be a part of vasculitis : her resp status has not changed   Bicytopneia and leucocytosis: BM biopsy done: await results for still pending   hx of Tachycardia :  Recent TTE on 11/3/22 reviewed: normal LV. outpt card Dr. Ady Cooper  Anxiety and depression  GERD (gastroesophageal reflux disease) : ppi   Hyperlipidemia.   recent covid  : on last admission she was treated for covid infection:   DVT ppx   dw team: awaiting decision on immunosuppression     1/26;      Fatigue/dyspnea: likely due to severe anemia : feels weak  but no bleeding noted:  on 2 L of oxygen : she needs PT OT : got it yesterday    Diarrhea :resolved:   Pulmonary vasculitis  : per rheumatology  : had recent vats surgery : LN biopsy noted: : she never received teat ment for vasculitis except low dose steroids:  she is awaiting bone marrow biopsy prior to induction therapy with rituximab: The ct chest done on this admission does not show pneumothorax and has jean-claude effusions:  There are some new opacities in rigth lower lobe which were not there:  clinically she does not seem to have pneumonia: ID following: could it be a part of vasculitis : her resp status has not changed  : being observed off antibiotics   Bicytopneia and leucocytosis: BM biopsy done: await results for still pending   hx of Tachycardia :  Recent TTE on 11/3/22 reviewed: normal LV. outpt card Dr. Ady Cooper  Anxiety and depression  GERD (gastroesophageal reflux disease) : ppi   Hyperlipidemia.   recent covid  : on last admission she was treated for covid infection:   DVT ppx   dw team: awaiting decision on immunosuppression     1/27:    Fatigue/dyspnea: likely due to severe anemia : feels weak  but no bleeding noted:  on 2 L of oxygen : cont  PT OT :   Diarrhea :resolved:   Pulmonary vasculitis  : per rheumatology  : had recent vats surgery : LN biopsy noted: : she never received teat ment for vasculitis except low dose steroids:  she is awaiting bone marrow biopsy prior to induction therapy with rituximab: The ct chest done on this admission does not show pneumothorax and has jean-claude effusions:  There are some new opacities in rigth lower lobe which were not there:  clinically she does not seem to have pneumonia: ID following: could it be a part of vasculitis : her resp status has not changed  : being observed off antibiotics :  on bactrim prophylaxis as she is on chr steroids   Bicytopneia and leucocytosis: BM biopsy done: await results for still pending   hx of Tachycardia :  Recent TTE on 11/3/22 reviewed: normal LV. outpt card Dr. Ady Cooper  Anxiety and depression  GERD (gastroesophageal reflux disease) : ppi   Hyperlipidemia.   recent covid  : on last admission she was treated for covid infection:   DVT ppx   dw team: awaiting decision on immunosuppression     1/29:    Fatigue/dyspnea: likely due to severe anemia : feels weak  but no bleeding noted:  on 2 L of oxygen : cont  PT OT :   Diarrhea :resolved:   Pulmonary vasculitis  : per rheumatology  : had recent vats surgery : LN biopsy noted: : she never received treat ment for vasculitis except low dose steroids:  she is awaiting bone marrow biopsy prior to induction therapy with rituximab: The ct chest done on this admission does not show pneumothorax and has jean-claude effusions:  There are some new opacities in rigth lower lobe which were not there:  clinically she does not seem to have pneumonia: ID following: could it be a part of vasculitis : her resp status has not changed  : being observed off antibiotics :  on bactrim prophylaxis as she is on chr steroids   Bicytopneia and leucocytosis: BM biopsy: results reviewed defer to [facility]  hx of Tachycardia :  Recent TTE on 11/3/22 reviewed: normal LV.   Anxiety and depression  GERD (gastroesophageal reflux disease) : ppi   Hyperlipidemia.   recent covid  : on last admission she was treated for covid infection:   DVT ppx   dw team: awaiting decision on immunosuppression     1/30:    Fatigue/dyspnea: likely due to severe anemia : feels weak  but no bleeding noted:  on 2 L of oxygen : cont  PT OT : sitting in chair today   Diarrhea :resolved:   Pulmonary vasculitis  : per rheumatology  : had recent vats surgery : LN biopsy noted: : she never received treat ment for vasculitis except low dose steroids:  she is awaiting bone marrow biopsy prior to induction therapy with rituximab: The ct chest done on this admission does not show pneumothorax and has jean-claude effusions:  There are some new opacities in rigth lower lobe which were not there:  clinically she does not seem to have pneumonia: ID following: could it be a part of vasculitis : her resp status has not changed  : being observed off antibiotics :  on bactrim prophylaxis as she is on chr steroids  /l however she had low grade tempt today : rvp is negative : cultures sent: ID follow up   Bicytopneia and leucocytosis: BM biopsy: results reviewed defer to [facility]  hx of Tachycardia :  Recent TTE on 11/3/22 reviewed: normal LV.   Anxiety and depression  GERD (gastroesophageal reflux disease) : ppi   Hyperlipidemia.   recent covid  : on last admission she was treated for covid infection:   DVT ppx   dw team: awaiting decision on immunosuppression     1/31:    Fatigue/dyspnea: likely due to severe anemia : feels weak  but no bleeding noted:  on 2 L of oxygen : cont  PT OT : lying in bed:   Diarrhea :resolved:   Pulmonary vasculitis  : per rheumatology  : had recent vats surgery : LN biopsy noted: : she never received treat ment for vasculitis except low dose steroids:  she is awaiting bone marrow biopsy prior to induction therapy with rituximab: The ct chest done on this admission does not show pneumothorax and has jean-claude effusions:  There are some new opacities in rigth lower lobe which were not there:  clinically she does not seem to have pneumonia: ID following: could it be a part of vasculitis : her resp status has not changed  : being observed off antibiotics :  on bactrim prophylaxis as she is on chr steroids : she seems OK:  no sob:     Bicytopneia and leucocytosis: BM biopsy: results reviewed defer to [facility] ; for eventual chemo   hx of Tachycardia :  Recent TTE on 11/3/22 reviewed: normal LV.   Anxiety and depression  GERD (gastroesophageal reflux disease) : ppi   Hyperlipidemia.   recent covid  : on last admission she was treated for covid infection:   DVT ppx   dw team: awaiting decision on immunosuppression     2/1:    Fatigue/dyspnea: likely due to severe anemia : feels weak  but no bleeding noted:  on 2 L of oxygen : cont  PT OT : lying in bed:  her blood cultures are contaminant:   Diarrhea :resolved:   Pulmonary vasculitis  : per rheumatology  : had recent vats surgery : LN biopsy noted: : she never received treat ment for vasculitis except low dose steroids:  she is awaiting bone marrow biopsy prior to induction therapy with rituximab: The ct chest done on this admission does not show pneumothorax and has jean-claude effusions:  There are some new opacities in rigth lower lobe which were not there:  clinically she does not seem to have pneumonia: ID following: could it be a part of vasculitis : her resp status has not changed  : being observed off antibiotics :  on bactrim prophylaxis as she is on chr steroids : she seems OK:  no sob:     Bicytopneia and leucocytosis: BM biopsy: results reviewed defer to [facility] ; for eventual chemo   hx of Tachycardia :  Recent TTE on 11/3/22 reviewed: normal LV.   Anxiety and depression  GERD (gastroesophageal reflux disease) : ppi   Hyperlipidemia.   recent covid  : on last admission she was treated for covid infection:   DVT ppx   dw team: awaiting decision on immunosuppression: overall her general condition seems very poor and very weak:     2/2:    Fatigue/dyspnea: likely due to severe anemia : feels weak  but no bleeding noted:  on 2 L of oxygen : cont  PT OT : lying in bed:  her blood cultures are positive E FAECALIS :  ON MEROPENEM  Diarrhea :resolved:   Pulmonary vasculitis  : per rheumatology  : had recent vats surgery : LN biopsy noted: : she never received treat ment for vasculitis except low dose steroids:  she is awaiting bone marrow biopsy prior to induction therapy with rituximab: The ct chest done on this admission does not show pneumothorax and has jean-claude effusions:  There are some new opacities in rigth lower lobe which were not there:  clinically she does not seem to have pneumonia: ID following: could it be a part of vasculitis : her resp status has not changed  : being observed off antibiotics :  on bactrim prophylaxis as she is on chr steroids : she seems OK:  no sob:     Bicytopneia and leucocytosis: BM biopsy: results reviewed defer to [facility] ; for eventual chemo   hx of Tachycardia :  Recent TTE on 11/3/22 reviewed: normal LV.   Anxiety and depression  GERD (gastroesophageal reflux disease) : ppi   Hyperlipidemia.   recent covid  : on last admission she was treated for covid infection:   DVT ppx   dw team: awaiting decision on immunosuppression: overall her general condition seems very poor and very weak: ON ANTIBIOTICS     2/3   Pulmonary Vasculitis  -Steroids per rheum  -Plan for eventual Rituxan  -Bactrim for PCP ppx  MDS  -Per heme/onc  Bacteremia  -E. Faecalis bacteremia  -ABX per ID  Epistaxis  -Per ENT  -Breathing comfortably on 40% humidified face tent, keep sats >90%    2/6:    2/6  Pulmonary Vasculitis  -Steroids per rheum  -Plan for eventual Rituxan /l she is on room air at this time  -Bactrim for PCP ppx  MDS  -Per heme/onc  Bacteremia  -E. Faecalis bacteremia  -ABX per ID  Epistaxis  -Per ENT  - she is on room air today  : cont to mantain o2 sao2 above 90% all the ti me:     acp      2/7:  Pulmonary Vasculitis  -Steroids per rheum  -Plan for eventual Rituxan /l she is on room air at this time  -Bactrim for PCP ppx  MDS  -Per heme/onc  Bacteremia  -E. Faecalis bacteremia  -ABX per ID : awaiting clearance from id for chemo : last blood cultures have been negative  Epistaxis  -Per ENT  - she is on room air today  : cont to mantain o2 sao2 above 90% all the ti me:     acp    2/8:  Pulmonary Vasculitis  -Steroids per rheum  -Plan for eventual Rituxan /l she is on room air at this time  -Bactrim for PCP ppx  MDS  -Per heme/onc  Bacteremia  -E. Faecalis bacteremia  -ABX per ID : awaiting clearance from id for chemo : last blood cultures have been negative : finishing antbiotics today  :  Epistaxis  -Per ENT  - she is on room air today  : cont to mantain o2 sao2 above 90% all the ti me:  Thrombocytopenia:  sign today  : 14k: no obvious bleeding: cont to monitorial : transfuse per [facility]mo[facility]     acp      2/9:    Pulmonary Vasculitis : Steroids per rheum : Plan for eventual Rituxan /l she is on room air at this time : Bactrim for PCP ppx : on room air: with no change in her resp status  MDS : s'p BM biopsy : has MDS : defer to hemonc  Bacteremia E. Faecalis bacteremia  -ABX per ID : awaiting clearance from id for chemo : last blood cultures have been negative : finished antibiotics   Epistaxis Per ENT : she is on room air today  : cont to mantain o2 sao2 above 90% all the ti me:  Thrombocytopenia:  sign today  : 10k: no obvious bleeding: cont to monitorial : transfuse per [facility] :? for plt transfusion  today      dw acp    2/10:  Pulmonary Vasculitis : Steroids per rheum : Plan for eventual Rituxan /l she is on room air at this time : Bactrim for PCP ppx : on room air: with no change in her resp status : now plan for rituximab aftger 5 days of dacogen   MDS : s'p BM biopsy : has MDS : defer to hemonc  Bacteremia E. Faecalis bacteremia  -ABX per ID : awaiting clearance from id for chemo : last blood cultures have been negative : finished antibiotics   Epistaxis Per ENT : she is on room air today  : cont to mantain o2 sao2 above 90% all the ti me:  Thrombocytopenia:  still low, but much better,  no obvious bleeding: cont to monitorial :  dw acp    2/12:  Pulmonary Vasculitis : Steroids per rheum : Plan for eventual Rituxan /l she is on room air at this time : Bactrim for PCP ppx : on room air: with no change in her resp status : now plan for rituximab after 5 days of dacogen : today is day 3 : doing  ok : no resp idstress  MDS : s'p BM biopsy : has MDS : defer to hemonc  Bacteremia E. Faecalis bacteremia  -ABX per ID : awaiting clearance from id for chemo : last blood cultures have been negative : finished antibiotics   Epistaxis Per ENT : she is on room air today  : cont to mantain o2 sao2 above 90% all the ti me:  Thrombocytopenia:  still low, but much better,  no obvious bleeding: cont to monitor :  dw acp      2/13:  Low grade temperature : pancultured:  chest xray with improvement in infiltrates : id following : no antibiotics yet:   Pulmonary Vasculitis : Steroids per rheum : Plan for eventual Rituxan.  she is on room air at this time : Bactrim for PCP ppx : on 2 L of oxygen today : rpt chest xray noted:   MDS : s'p BM biopsy : has MDS : defer to hemonc  Bacteremia E. Faecalis bacteremia  -ABX per ID : awaiting clearance from id for chemo : last blood cultures have been negative : finished antibiotics   Epistaxis Per ENT : resolved  Thrombocytopenia:  for transfusion today    dw acp : pt looks pretty weak and cachectic:      2/14;  Low grade temperature :resolved  pancultured:  chest xray with improvement in infiltrates : id following : no antibiotics yet:   Pulmonary Vasculitis : Steroids per rheum : Plan for eventual Rituxan.  she is on room air at this time : Bactrim for PCP ppx : on 2 L of oxygen today : rpt chest xray noted:   MDS : s'p BM biopsy : has MDS : defer to [facility]mon[facility]  Bacteremia E. Faecalis bacteremia: resolved:   Epistaxis Per ENT : resolved  Thrombocytopenia:  for transfusion today    dw acp : pt looks pretty weak and cachectic:

## 2023-02-14 NOTE — ADVANCED PRACTICE NURSE CONSULT - ASSESSMENT
Pt with day 5/5 tx cycle 1, labs noted in sunrise, height, weight and bsa verified, okay to proceed with tx as per MD Nye.  Pt s/p 1 unit platelets administered by area rn.  Pt premedicated as noted in sunrise.  Pt administered Decitabine 20 mg/m2 = 30 mg iv over 1 hour via locked pump.  Reinforced with pt and son at bedside chemo regimen and signs and symptoms to report to area rn and staff, both verbalized understanding.  Emotional support provided.  Report given to area rn.

## 2023-02-14 NOTE — PROGRESS NOTE ADULT - SUBJECTIVE AND OBJECTIVE BOX
[facility]   MD RONEL FRIAS MD, DO KARELY DANIELSON NP    TEL:  OFFICE: [phone]    From 5pm-7am Answering Service [phone]    -- RENAL FOLLOW UP NOTE ---Date of Service 02-14-23 @ 13:27    Patient is a 82y old  Female who presents with a chief complaint of weakness (14 Feb 2023 12:22)      Patient seen and examined at bedside. No chest pain/sob    VITALS:  T(F): 97.3 (02-14-23 @ 10:57), Max: 97.8 (02-13-23 @ 20:24)  HR: 97 (02-14-23 @ 10:57)  BP: 98/57 (02-14-23 @ 10:57)  RR: 18 (02-14-23 @ 10:57)  SpO2: 92% (02-14-23 @ 10:57)  Wt(kg): --    02-13 @ 07:01  -  02-14 @ 07:00  --------------------------------------------------------  IN: 176 mL / OUT: 0 mL / NET: 176 mL    02-14 @ 07:01  -  02-14 @ 13:27  --------------------------------------------------------  IN: 240 mL / OUT: 0 mL / NET: 240 mL          PHYSICAL EXAM:  Constitutional: NAD  Neck: No JVD  Respiratory: CTAB, no wheezes, rales or rhonchi  Cardiovascular: S1, S2, RRR  Gastrointestinal: BS+, soft, NT/ND  Extremities: No peripheral edema    Hospital Medications:   MEDICATIONS  (STANDING):  Biotene Dry Mouth Oral Rinse 15 milliLiter(s) Swish and Spit four times a day  budesonide 160 MICROgram(s)/formoterol 4.5 MICROgram(s) Inhaler 2 Puff(s) Inhalation two times a day  chlorhexidine 2% Cloths 1 Application(s) Topical daily  cholecalciferol 2000 Unit(s) Oral daily  citalopram 10 milliGRAM(s) Oral daily  decitabine IVPB (eMAR) 30 milliGRAM(s) IV Intermittent every 24 hours  FIRST- Mouthwash  BLM 10 milliLiter(s) Swish and Spit four times a day  fluticasone propionate 50 MICROgram(s)/spray Nasal Spray 1 Spray(s) Both Nostrils two times a day  folic acid 1 milliGRAM(s) Oral daily  guaiFENesin  milliGRAM(s) Oral every 12 hours  influenza  Vaccine (HIGH DOSE) 0.7 milliLiter(s) IntraMuscular once  ipratropium    for Nebulization 500 MICROGram(s) Nebulizer every 6 hours  lactobacillus acidophilus 1 Tablet(s) Oral daily  metoprolol tartrate 25 milliGRAM(s) Oral two times a day  mirtazapine 7.5 milliGRAM(s) Oral daily  ondansetron Injectable 8 milliGRAM(s) IV Push every 24 hours  pantoprazole    Tablet 40 milliGRAM(s) Oral before breakfast  predniSONE   Tablet 20 milliGRAM(s) Oral daily  senna 1 Tablet(s) Oral daily  sodium bicarbonate 650 milliGRAM(s) Oral three times a day  sodium chloride 0.65% Nasal 2 Spray(s) Both Nostrils every 6 hours  trimethoprim  160 mG/sulfamethoxazole 800 mG 1 Tablet(s) Oral daily      LABS:  02-14    140  |  106  |  29<H>  ----------------------------<  119<H>  3.5   |  19<L>  |  0.80    Ca    9.0      14 Feb 2023 06:38  Phos  2.8     02-14  Mg     2.0     02-14    TPro  6.4  /  Alb  3.3  /  TBili  1.6<H>  /  DBili      /  AST  21  /  ALT  6<L>  /  AlkPhos  103  02-14    Creatinine Trend: 0.80 <--, 1.04 <--, 0.83 <--, 0.81 <--, 0.73 <--, 0.72 <--, 0.64 <--    Albumin, Serum: 3.3 g/dL (02-14 @ 06:38)  Phosphorus Level, Serum: 2.8 mg/dL (02-14 @ 06:38)                              8.9    53.70 )-----------( 8        ( 14 Feb 2023 06:38 )             26.8     Urine Studies:  Urinalysis - [02-01-23 @ 09:57]      Color Yellow / Appearance Slightly Turbid / SG 1.033 / pH 7.0      Gluc Negative / Ketone Trace  / Bili Negative / Urobili Negative       Blood Small / Protein 300 mg/dL / Leuk Est Negative / Nitrite Negative      RBC 4 /  / Hyaline 4 / Gran  / Sq Epi  / Non Sq Epi 10 / Bacteria Moderate      Iron 152, TIBC 180, %sat 84      [01-19-23 @ 11:19]  Ferritin 5768      [01-19-23 @ 11:19]  Vitamin D (25OH) 28.1      [01-19-23 @ 11:19]  Lipid: chol 84, TG 81, HDL 22, LDL --      [10-01-22 @ 07:10]    HBsAg Nonreact      [01-25-23 @ 07:18]  HBcAb Nonreact      [01-25-23 @ 07:18]  HCV 0.15, Nonreact      [01-25-23 @ 07:18]    MPO-ANCA <5.0, interpretation: Negative      [02-02-23 @ 07:22]  PR3-ANCA <5.0, interpretation: Negative      [02-02-23 @ 07:22]  Free Light Chains: kappa 4.65, lambda 2.60, ratio = 1.79      [01-25 @ 07:18]    RADIOLOGY & ADDITIONAL STUDIES:

## 2023-02-14 NOTE — PROGRESS NOTE ADULT - SUBJECTIVE AND OBJECTIVE BOX
Hematology/Oncology Follow-up    INTERVAL HPI/OVERNIGHT EVENTS:  Patient S&E at bedside. No o/n events, patient resting comfortably. No complaints at this time. Patient denies fever, chills, dizziness, weakness, CP, palpitations, SOB, cough, N/V/D/C, dysuria, changes in bowel movements, LE edema.    VITAL SIGNS:  T(F): 97.3 (02-14-23 @ 10:57)  HR: 97 (02-14-23 @ 10:57)  BP: 98/57 (02-14-23 @ 10:57)  RR: 18 (02-14-23 @ 10:57)  SpO2: 92% (02-14-23 @ 10:57)  Wt(kg): --    PHYSICAL EXAM:    Constitutional: AAOx3, NAD,   Eyes: PERRL, EOMI, sclera non-icteric  Neck: supple, no masses, no JVD  Respiratory: CTA b/l, good air entry b/l, no wheezing, rhonchi, rales, with normal respiratory effort and no intercostal retractions  Cardiovascular: RRR, normal S1S2, no M/R/G  Gastrointestinal: soft, NTND, no masses palpable, BS normal in all four quadrants, no HSM  Extremities:  no c/c/e  Neurological: Grossly intact  Skin: No rash or lesion    MEDICATIONS  (STANDING):  Biotene Dry Mouth Oral Rinse 15 milliLiter(s) Swish and Spit four times a day  budesonide 160 MICROgram(s)/formoterol 4.5 MICROgram(s) Inhaler 2 Puff(s) Inhalation two times a day  chlorhexidine 2% Cloths 1 Application(s) Topical daily  cholecalciferol 2000 Unit(s) Oral daily  citalopram 10 milliGRAM(s) Oral daily  decitabine IVPB (eMAR) 30 milliGRAM(s) IV Intermittent every 24 hours  FIRST- Mouthwash  BLM 10 milliLiter(s) Swish and Spit four times a day  fluticasone propionate 50 MICROgram(s)/spray Nasal Spray 1 Spray(s) Both Nostrils two times a day  folic acid 1 milliGRAM(s) Oral daily  guaiFENesin  milliGRAM(s) Oral every 12 hours  influenza  Vaccine (HIGH DOSE) 0.7 milliLiter(s) IntraMuscular once  ipratropium    for Nebulization 500 MICROGram(s) Nebulizer every 6 hours  lactobacillus acidophilus 1 Tablet(s) Oral daily  metoprolol tartrate 25 milliGRAM(s) Oral two times a day  mirtazapine 7.5 milliGRAM(s) Oral daily  ondansetron Injectable 8 milliGRAM(s) IV Push every 24 hours  pantoprazole    Tablet 40 milliGRAM(s) Oral before breakfast  predniSONE   Tablet 20 milliGRAM(s) Oral daily  senna 1 Tablet(s) Oral daily  sodium chloride 0.65% Nasal 2 Spray(s) Both Nostrils every 6 hours  trimethoprim  160 mG/sulfamethoxazole 800 mG 1 Tablet(s) Oral daily    MEDICATIONS  (PRN):  acetaminophen     Tablet .. 650 milliGRAM(s) Oral every 6 hours PRN Temp greater or equal to 38C (100.4F), Mild Pain (1 - 3)  aluminum hydroxide/magnesium hydroxide/simethicone Suspension 30 milliLiter(s) Oral every 4 hours PRN Dyspepsia  benzocaine/menthol Lozenge 1 Lozenge Oral every 8 hours PRN Sore Throat  melatonin 3 milliGRAM(s) Oral at bedtime PRN Insomnia  ondansetron Injectable 4 milliGRAM(s) IV Push every 8 hours PRN Nausea and/or Vomiting  polyethylene glycol 3350 17 Gram(s) Oral daily PRN Constipation      codeine (Nausea)  doxycycline (Nausea)  penicillin (Hives)      LABS:                        8.9    53.70 )-----------( 8        ( 14 Feb 2023 06:38 )             26.8     02-14    140  |  106  |  29<H>  ----------------------------<  119<H>  3.5   |  19<L>  |  0.80    Ca    9.0      14 Feb 2023 06:38  Phos  2.8     02-14  Mg     2.0     02-14    TPro  6.4  /  Alb  3.3  /  TBili  1.6<H>  /  DBili  x   /  AST  21  /  ALT  6<L>  /  AlkPhos  103  02-14     Lactate Dehydrogenase, Serum: 896 U/L (02-14 @ 06:38)        RADIOLOGY & ADDITIONAL TESTS:  Studies reviewed.

## 2023-02-14 NOTE — PROGRESS NOTE ADULT - ASSESSMENT
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 She presented here from [facility] for elevated WBC and low hemoglobin, severe weakness. Pt states for the past 2-3 days has been having increased weakness and lethargy, decreased appetite. +sputum productive cough. + cui, no sob, no difficulty breathing. No abdominal pain, nausea, vomiting, no bloody stools. Has endorsed multiple episodes of loose stools x weeks, currently being treated for c.diff with oral vancomycin.     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 no BRBPR. occult stool neg.   - she tends to require IV Lasix intermittently post transfusion.  - doubt GI bleed   - Physical therapy. Out of bed to chair with assistance.     Diarrhea, unspecified  - elevated WBC  - no documented c.diff PCR, re-ordered.  - s/p Vanco PO a few days (started in rehab)  - diarrhea completely resolved.   - monitor off PO vanco per ID  - now constipated. PRN bowel regimen started. +BM    Fever, resolved.   - RVP 1/30/23 (-)  - monitor blood cxs 1/30/23  - started empirically on cefepime 1/30/23, switched to merrem 1/31/23 --> 2/3/23  - enterrocus bacteremia, abx per ID. on IV Vanco, last day was 2/10/23  - Macrobid x 5 days course added for VRE in urine, last day was 2/8/23  - blood cultures now negative     AMS  - unclear etiology, likely metabolic encephalopathy from ?Cefepime? received 3 doses, last dose 1/31/23  - CT head neg. sugars stable  - close monitoring   - monitor glucose (glucose 65 on BMP, but 95 on fingersticks)  - encourage PO intake   - mental status improved    Pulmonary vasculitis   - Recent TTE on 11/3/22 reviewed: normal LV. outpt card Dr. Ady Cooper  - outpt pulm Mack Tucker   - s/p thoracoscopic biopsy of mediastinal lymph node and Lung wedge resection 11/10/22  - recent pleural effusion s/p 650 cc U/S-guided rt thoracentesis 11/17/22  - exudative but no neutrophilic predominance and initial Gram stain w/o organisms  - cultures neg.   - path results favoring vasculitis: no evidence for lymphoma. Cytology also came back negative.   - comfortable on nasal canula, better post diuretic last admission.   - rheum and heme-onc following previously, will reconsult.   - last admission, she was not started on immunosuppressants such as cellcept given recent treatment for COVID19 at that time  - c/w prednisone 20 mg qdaily.   - RTX under consideration after administration of dacogen --> acute hepatitis panel (-) and quantiferon indeterminate  - ID started PCP ppx with Bactrim.     hx of Tachycardia    - cont low-dose metoprolol, 50 mg to 25 mg dose reduced in rehab.   - card consult (Dr. Hooker) in the past, if needed    Anxiety and depression  - stable, continue citalopram and Remeron.  - Pt denied SI/HI ideations, denied visual and auditory hallucinations.     GERD (gastroesophageal reflux disease)  - continue PPI    Hyperlipidemia.   - continue home ezetimibe. okay to hold if nonformulary     Hyponatremia 127 (hypovolemic?)  - renal on the case, resolved.   - s/p 3 days of IV lasix, now completed. electrolytes supplemented.  - O2 weaned off from ventimask to nascal canula to room air.     DVT ppx   - holding AC in the setting of anemia, pancytopenia.   - venodyne boots LEs

## 2023-02-14 NOTE — ADVANCED PRACTICE NURSE CONSULT - RECOMMEDATIONS
monitor strict i and o, n/v, labs, hazardous drug precautions
monitor strict i and o, n/v, am labs, hazardous precautions
monitor strict i and o, n/v, labs, hazardous drug precautions
Monitor for s/e monitor labs as ordered strict I/O encourage PO hydration
will continue to monitor the labs , N/v , I/O

## 2023-02-14 NOTE — PROGRESS NOTE ADULT - SUBJECTIVE AND OBJECTIVE BOX
[facility]      Seen earlier today Date of service T(F): 97.7 (23 @ 05:35), Max: 97.8 (23 @ 20:24)  HR: 101 (23 @ 05:35) (89 - 107)  BP: 111/57 (23 @ 05:35) (110/67 - 133/76)  RR: 18 (23 @ 05:35) (18 - 19)  SpO2: 94% (23 @ 05:35) (94% - 99%)  Wt(kg): -- No new neurological symptoms reported. Remains stable neurologically.   - Today, patient is without complaints.    T(F): 97.7 (23 @ 05:35), Max: 97.8 (23 @ 20:24)  HR: 101 (23 @ 05:35) (89 - 107)  BP: 111/57 (23 @ 05:35) (110/67 - 133/76)  RR: 18 (23 @ 05:35) (18 - 19)  SpO2: 94% (23 @ 05:35) (94% - 99%)  Wt(kg): --       *****MEDICATIONS: Current medication reviewed and documented.    MEDICATIONS  (STANDING):  Biotene Dry Mouth Oral Rinse 15 milliLiter(s) Swish and Spit four times a day  budesonide 160 MICROgram(s)/formoterol 4.5 MICROgram(s) Inhaler 2 Puff(s) Inhalation two times a day  chlorhexidine 2% Cloths 1 Application(s) Topical daily  cholecalciferol 2000 Unit(s) Oral daily  citalopram 10 milliGRAM(s) Oral daily  decitabine IVPB (eMAR) 30 milliGRAM(s) IV Intermittent every 24 hours  FIRST- Mouthwash  BLM 10 milliLiter(s) Swish and Spit four times a day  fluticasone propionate 50 MICROgram(s)/spray Nasal Spray 1 Spray(s) Both Nostrils two times a day  folic acid 1 milliGRAM(s) Oral daily  guaiFENesin  milliGRAM(s) Oral every 12 hours  influenza  Vaccine (HIGH DOSE) 0.7 milliLiter(s) IntraMuscular once  ipratropium    for Nebulization 500 MICROGram(s) Nebulizer every 6 hours  lactobacillus acidophilus 1 Tablet(s) Oral daily  metoprolol tartrate 25 milliGRAM(s) Oral two times a day  mirtazapine 7.5 milliGRAM(s) Oral daily  ondansetron Injectable 8 milliGRAM(s) IV Push every 24 hours  pantoprazole    Tablet 40 milliGRAM(s) Oral before breakfast  predniSONE   Tablet 20 milliGRAM(s) Oral daily  senna 1 Tablet(s) Oral daily  sodium chloride 0.65% Nasal 2 Spray(s) Both Nostrils every 6 hours  trimethoprim  160 mG/sulfamethoxazole 800 mG 1 Tablet(s) Oral daily    MEDICATIONS  (PRN):  acetaminophen     Tablet .. 650 milliGRAM(s) Oral every 6 hours PRN Temp greater or equal to 38C (100.4F), Mild Pain (1 - 3)  aluminum hydroxide/magnesium hydroxide/simethicone Suspension 30 milliLiter(s) Oral every 4 hours PRN Dyspepsia  benzocaine/menthol Lozenge 1 Lozenge Oral every 8 hours PRN Sore Throat  melatonin 3 milliGRAM(s) Oral at bedtime PRN Insomnia  ondansetron Injectable 4 milliGRAM(s) IV Push every 8 hours PRN Nausea and/or Vomiting  polyethylene glycol 3350 17 Gram(s) Oral daily PRN Constipation          ***** VITAL SIGNS:   Vital Signs Last 24 Hrs  T(F): 97.7 (23 @ 05:35), Max: 97.8 (23 @ 20:24)  HR: 101 (23 @ 05:35) (89 - 107)  BP: 111/57 (23 @ 05:35) (110/67 - 133/76)  RR: 18 (23 @ 05:35) (18 - 19)  SpO2: 94% (23 @ 05:35) (94% - 99%)  Wt(kg): --    T(F): 97.7 (23 @ 05:35), Max: 97.8 (23 @ 20:24)  HR: 101 (23 @ 05:35) (89 - 107)  BP: 111/57 (23 @ 05:35) (110/67 - 133/76)  RR: 18 (23 @ 05:35) (18 - 19)  SpO2: 94% (23 @ 05:35) (94% - 99%)  Wt(kg): --  I&O's Summary    2023 07:01  -  2023 07:00  --------------------------------------------------------  IN: 176 mL / OUT: 0 mL / NET: 176 mL             *****PHYSICAL EXAM:   alert   attention comprehension are fair.  Able to name, repeat.   EOmi fundi not visualized   no nystagmus VFF to confrontation  Tongue is midline  Palate elevates symmetrically   Moving all 4 ext spontaneously no drift appreciated    Gait not assessed.            *****LAB AND IMAGIN.9    53.70 )-----------( 8        ( 2023 06:38 )             26.8                   140  |  106  |  29<H>  ----------------------------<  119<H>  3.5   |  19<L>  |  0.80    Ca    9.0      2023 06:38  Phos  2.8       Mg     2.0         TPro  6.4  /  Alb  3.3  /  TBili  1.6<H>  /  DBili  x   /  AST  21  /  ALT  6<L>  /  AlkPhos  103  14                         [All pertinent recent Imaging/Reports reviewed]  T(F): 97.7 (23 @ 05:35), Max: 97.8 (23 @ 20:24)  HR: 101 (23 @ 05:35) (89 - 107)  BP: 111/57 (23 @ 05:35) (110/67 - 133/76)  RR: 18 (23 @ 05:35) (18 - 19)  SpO2: 94% (23 @ 05:35) (94% - 99%)  Wt(kg): --         *****A S S E S S M E N T   A N D   P L A N :    82 yr old female with a PMHx of diffuse large B cell lymphoma in remission, non-hodgkin's lymphoma (chemoport), depression, HLD, GERD, PE/DVT (2021 no longer on Eliquis), 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. She presented here from [facility] for elevated WBC and low hemoglobin, severe weakness. Pt states for the past 2-3 days has been having increased weakness and lethargy, decreased appetite. +sputum productive cough. + cui, no sob, no difficulty breathing. No abdominal pain, nausea, vomiting, no bloody stools. Has endorsed multiple episodes of loose stools x weeks, currently being treated for c.diff with oral vancomycin        Problem/Recommendations 1:  mutism   likely metabolic encephalopathy due to sepsis, abx related vs malnutrition worsening underlying cognitive impairment   gut microbiome depletion due to recurrent abx   consider lactobacillus   now improving spontaneously   thiamine iv  encourage po intake   avoid cefepime   sleep wake cycle regulation    mental status back to baseline    mental status ok           Problem/Recommendations 2:    leukocytosis   defer to id/onc follow up cultures   flow cytometry    r/o cdiff    low platelet   monitor closely   epistasis today           Problem/Recommendations 3: thrombocytopenia   hematology on board  ? DIC     overall prognosis is guarded   will sign off     Thank you for allowing me to participate in the care of this patient. Will continue to follow patient periodically. Please do not hesitate to call me if you have any  questions or if there has been a change in patients neurological status     ________________  Alina Wasserman MD  [facility] ([facility]  [phone]      33 minutes spent on total encounter; more than 50 % of the visit was  spent counseling about plan of care, compliance to diet/exercise and medication regimen and or  coordinating care by the attending physician.      It is advised that stroke patients follow up with JUSTIN Umanzor @ [phone] in 1- 2 weeks.   Others please follow up with Dr. Michael Nissenbaum [phone]

## 2023-02-14 NOTE — PROGRESS NOTE ADULT - SUBJECTIVE AND OBJECTIVE BOX
Date of Service: 02-14-23 @ 12:22    Patient is a 82y old  Female who presents with a chief complaint of weakness (14 Feb 2023 11:56)      Any change in ROS: seems OK: no sob:  no cough: on 2 L of oxygen : looks pretty weak      MEDICATIONS  (STANDING):  Biotene Dry Mouth Oral Rinse 15 milliLiter(s) Swish and Spit four times a day  budesonide 160 MICROgram(s)/formoterol 4.5 MICROgram(s) Inhaler 2 Puff(s) Inhalation two times a day  chlorhexidine 2% Cloths 1 Application(s) Topical daily  cholecalciferol 2000 Unit(s) Oral daily  citalopram 10 milliGRAM(s) Oral daily  decitabine IVPB (eMAR) 30 milliGRAM(s) IV Intermittent every 24 hours  FIRST- Mouthwash  BLM 10 milliLiter(s) Swish and Spit four times a day  fluticasone propionate 50 MICROgram(s)/spray Nasal Spray 1 Spray(s) Both Nostrils two times a day  folic acid 1 milliGRAM(s) Oral daily  guaiFENesin  milliGRAM(s) Oral every 12 hours  influenza  Vaccine (HIGH DOSE) 0.7 milliLiter(s) IntraMuscular once  ipratropium    for Nebulization 500 MICROGram(s) Nebulizer every 6 hours  lactobacillus acidophilus 1 Tablet(s) Oral daily  metoprolol tartrate 25 milliGRAM(s) Oral two times a day  mirtazapine 7.5 milliGRAM(s) Oral daily  ondansetron Injectable 8 milliGRAM(s) IV Push every 24 hours  pantoprazole    Tablet 40 milliGRAM(s) Oral before breakfast  predniSONE   Tablet 20 milliGRAM(s) Oral daily  senna 1 Tablet(s) Oral daily  sodium chloride 0.65% Nasal 2 Spray(s) Both Nostrils every 6 hours  trimethoprim  160 mG/sulfamethoxazole 800 mG 1 Tablet(s) Oral daily    MEDICATIONS  (PRN):  acetaminophen     Tablet .. 650 milliGRAM(s) Oral every 6 hours PRN Temp greater or equal to 38C (100.4F), Mild Pain (1 - 3)  aluminum hydroxide/magnesium hydroxide/simethicone Suspension 30 milliLiter(s) Oral every 4 hours PRN Dyspepsia  benzocaine/menthol Lozenge 1 Lozenge Oral every 8 hours PRN Sore Throat  melatonin 3 milliGRAM(s) Oral at bedtime PRN Insomnia  ondansetron Injectable 4 milliGRAM(s) IV Push every 8 hours PRN Nausea and/or Vomiting  polyethylene glycol 3350 17 Gram(s) Oral daily PRN Constipation    Vital Signs Last 24 Hrs  T(C): 36.3 (14 Feb 2023 10:57), Max: 36.6 (13 Feb 2023 20:24)  T(F): 97.3 (14 Feb 2023 10:57), Max: 97.8 (13 Feb 2023 20:24)  HR: 97 (14 Feb 2023 10:57) (89 - 107)  BP: 98/57 (14 Feb 2023 10:57) (98/57 - 133/76)  BP(mean): --  RR: 18 (14 Feb 2023 10:57) (18 - 19)  SpO2: 92% (14 Feb 2023 10:57) (92% - 99%)    Parameters below as of 14 Feb 2023 10:57  Patient On (Oxygen Delivery Method): nasal cannula  O2 Flow (L/min): 2      I&O's Summary    13 Feb 2023 07:01  -  14 Feb 2023 07:00  --------------------------------------------------------  IN: 176 mL / OUT: 0 mL / NET: 176 mL          Physical Exam:   GENERAL: weak   HEENT: RANJIT/   Atraumatic, Normocephalic  ENMT: No tonsillar erythema, exudates, or enlargement; Moist mucous membranes, Good dentition, No lesions  NECK: Supple, No JVD, Normal thyroid  CHEST/LUNG: Clear to auscultaion, ; No rales, rhonchi, wheezing, or rubs  CVS: Regular rate and rhythm; No murmurs, rubs, or gallops  GI: : Soft, Nontender, Nondistended; Bowel sounds present  NERVOUS SYSTEM:  Alert & Oriented X3  EXTREMITIES:  2+ Peripheral Pulses, No clubbing, cyanosis, or edema  LYMPH: No lymphadenopathy noted  SKIN: No rashes or lesions  ENDOCRINOLOGY: No Thyromegaly  PSYCH: Appropriate    Labs:                              8.9    53.70 )-----------( 8        ( 14 Feb 2023 06:38 )             26.8                         7.7    61.18 )-----------( 43       ( 13 Feb 2023 16:41 )             23.1                         6.4    66.95 )-----------( 7        ( 13 Feb 2023 09:22 )             19.7                         7.0    70.94 )-----------( 11       ( 12 Feb 2023 17:26 )             20.8                         7.2    62.29 )-----------( 18       ( 12 Feb 2023 07:05 )             21.9                         7.7    60.71 )-----------( 10       ( 11 Feb 2023 07:05 )             23.6     02-14    140  |  106  |  29<H>  ----------------------------<  119<H>  3.5   |  19<L>  |  0.80  02-13    138  |  103  |  32<H>  ----------------------------<  149<H>  4.2   |  21<L>  |  1.04  02-12    138  |  102  |  20  ----------------------------<  66<L>  3.3<L>   |  21<L>  |  0.83  02-11    136  |  103  |  17  ----------------------------<  80  3.9   |  18<L>  |  0.81    Ca    9.0      14 Feb 2023 06:38  Ca    8.6      13 Feb 2023 09:22  Phos  2.8     02-14  Phos  3.8     02-13  Mg     2.0     02-14  Mg     1.9     02-13    TPro  6.4  /  Alb  3.3  /  TBili  1.6<H>  /  DBili  x   /  AST  21  /  ALT  6<L>  /  AlkPhos  103  02-14  TPro  5.9<L>  /  Alb  3.2<L>  /  TBili  1.0  /  DBili  x   /  AST  21  /  ALT  5<L>  /  AlkPhos  94  02-13  TPro  6.1  /  Alb  3.5  /  TBili  0.8  /  DBili  x   /  AST  15  /  ALT  6<L>  /  AlkPhos  78  02-12  TPro  6.0  /  Alb  3.1<L>  /  TBili  0.8  /  DBili  x   /  AST  19  /  ALT  5<L>  /  AlkPhos  74  02-11    CAPILLARY BLOOD GLUCOSE          LIVER FUNCTIONS - ( 14 Feb 2023 06:38 )  Alb: 3.3 g/dL / Pro: 6.4 g/dL / ALK PHOS: 103 U/L / ALT: 6 U/L / AST: 21 U/L / GGT: x                     RECENT CULTURES:  02-12 @ 17:54 .Blood Blood-Peripheral            rad< from: Xray Chest 1 View- PORTABLE-Urgent (Xray Chest 1 View- PORTABLE-Urgent .) (02.12.23 @ 15:32) >      INTERPRETATION:  Chest one view    HISTORY: Fever    COMPARISON STUDY: 2/1/2023    Frontal expiratory view of the chest shows the heart to be normal in   size. Left chest port is present with catheter tip at the SVC right   atrial junction.    The lungs show clearing of the lungs with atelectasis or small residual   infiltrate of the lower right lung and there is no evidenceof   pneumothorax nor pleural effusion.    IMPRESSION:  Resolving infiltrates as noted.        Thank you for the courtesy of this referral.    --- End of Report ---    < end of copied text >  < from: Xray Abdomen 1 View PORTABLE -Routine (Xray Abdomen 1 View PORTABLE -Routine .) (02.03.23 @ 15:26) >  FINDINGS:  Nonobstructive bowel gas pattern. Moderate volume of stool throughout the   colon and rectum..  There is no evidence of intraperitoneal free air on this single supine   radiograph.  The visualized osseous structures demonstrate no acute pathology.   Vertebral body compression deformity and degenerative changes in the   lumbar spine.    IMPRESSION:  Nonobstructive bowel gas pattern. Moderate stool burden.    --- End of Report ---           SHARAD SHEPHERD MD; Resident Radiologist  This document has been electronically signed.  LOAN TODD MD; Attending Radiologist  This document has been electronically signed. Feb  3 2023  3:57PM    < end of copied text >      No growth to date.    02-12 @ 17:26 .Blood Blood-Peripheral                No growth to date.          RESPIRATORY CULTURES:          Studies  Chest X-RAY  CT SCAN Chest   Venous Dopplers: LE:   CT Abdomen  Others

## 2023-02-14 NOTE — PROGRESS NOTE ADULT - SUBJECTIVE AND OBJECTIVE BOX
No N/V  No diarrhea    (-) epistaxis  (-) BRBPR    Vital Signs Last 24 Hrs  T(C): 36.3 (14 Feb 2023 10:57), Max: 36.6 (13 Feb 2023 20:24)  T(F): 97.3 (14 Feb 2023 10:57), Max: 97.8 (13 Feb 2023 20:24)  HR: 97 (14 Feb 2023 10:57) (89 - 107)  BP: 98/57 (14 Feb 2023 10:57) (98/57 - 133/76)  BP(mean): --  RR: 18 (14 Feb 2023 10:57) (18 - 19)  SpO2: 92% (14 Feb 2023 10:57) (92% - 99%)    I&O's Summary    02-13-23 @ 07:01  -  02-14-23 @ 07:00  --------------------------------------------------------  IN: 176 mL / OUT: 0 mL / NET: 176 mL        PHYSICAL EXAM:  GENERAL: NAD, well-developed, comfortable on room air  HEAD:  Atraumatic, Normocephalic  EYES: EOMI, PERRLA, conjunctiva and sclera clear  NECK: Supple, No JVD  CHEST/LUNG: mild decrease breath sounds bilaterally; No wheeze   HEART: Regular rate and rhythm; No murmurs, rubs, or gallops  ABDOMEN: Soft, Nontender, Nondistended; Bowel sounds present  Neuro: awake alert, back to baseline mental status. no focal weakness  EXTREMITIES:  2+ Peripheral Pulses, No clubbing, cyanosis, trace b/l edema  SKIN: superficial ecchymoses.     LABS:                        8.9    53.70 )-----------( 8        ( 14 Feb 2023 06:38 )             26.8     02-14    140  |  106  |  29<H>  ----------------------------<  119<H>  3.5   |  19<L>  |  0.80    Ca    9.0      14 Feb 2023 06:38  Phos  2.8     02-14  Mg     2.0     02-14    TPro  6.4  /  Alb  3.3  /  TBili  1.6<H>  /  DBili  x   /  AST  21  /  ALT  6<L>  /  AlkPhos  103  02-14      CAPILLARY BLOOD GLUCOSE                RADIOLOGY & ADDITIONAL TESTS:    Imaging Personally Reviewed:  [x] YES  [ ] NO    Case discussed with NPP:  [X] YES  [ ] NO

## 2023-02-14 NOTE — ADVANCED PRACTICE NURSE CONSULT - REASON FOR CONSULT
Chemotherapy: Cycle 1, Day 4/5
Chemotherapy
Chemotherapy nurses note:       Cycle 1   Day 3/5
chemotherapy
chemotherapy note

## 2023-02-14 NOTE — PROGRESS NOTE ADULT - ASSESSMENT
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 She presented here from [facility] for elevated WBC and low hemoglobin, severe weakness. Pt states for the past 2-3 days has been having increased weakness and lethargy, decreased appetite. +sputum productive cough. + cui, no sob, no difficulty breathing. No abdominal pain, nausea, vomiting, no bloody stools. Has endorsed multiple episodes of loose stools x weeks, currently being treated for c.diff with oral vancomycin. Nephrology consulted for Hyponatremia.       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

## 2023-02-14 NOTE — PROGRESS NOTE ADULT - SUBJECTIVE AND OBJECTIVE BOX
[facility] DIVISION OF INFECTIOUS DISEASES  ANDREW Rodríguez Y. Patel, S. Shah, G. Casimir  [phone]  ([phone] - weekdays after 5pm and weekends)    Name: BETTIE NGUYEN  Age/Gender: 82y Female  MRN: [id number]    Interval History:  Patient seen and examined this morning.   Denies fever, pain or any complaints.   Notes reviewed. Afebrile   Allergies: codeine (Nausea)  doxycycline (Nausea)  penicillin (Hives)    Objective:  Vitals:   T(F): 97.7 (02-14-23 @ 05:35), Max: 97.8 (02-13-23 @ 10:50)  HR: 101 (02-14-23 @ 05:35) (89 - 107)  BP: 111/57 (02-14-23 @ 05:35) (110/67 - 133/76)  RR: 18 (02-14-23 @ 05:35) (18 - 19)  SpO2: 94% (02-14-23 @ 05:35) (94% - 99%)  Physical Examination:  General: no acute distress, NC  HEENT: NC/AT, anicteric, dried blood on lip, neck supple  Respiratory: no acc muscle use, breathing comfortably  Cardiovascular: S1 and S2 present  Gastrointestinal: soft, nontender, nondistended  Extremities: no edema, no cyanosis  Skin: warm, dry, no visible rash  Lines: L chest port accessed with no TTP/erythema    Laboratory Studies:  CBC:                       8.9    53.70 )-----------( 8        ( 14 Feb 2023 06:38 )             26.8     WBC Trend:  53.70 02-14-23 @ 06:38  61.18 02-13-23 @ 16:41  66.95 02-13-23 @ 09:22  70.94 02-12-23 @ 17:26  62.29 02-12-23 @ 07:05  60.71 02-11-23 @ 07:05  54.66 02-10-23 @ 06:50  49.53 02-09-23 @ 06:55  45.24 02-08-23 @ 06:54    CMP: 02-14    140  |  106  |  29<H>  ----------------------------<  119<H>  3.5   |  19<L>  |  0.80    Ca    9.0      14 Feb 2023 06:38  Phos  2.8     02-14  Mg     2.0     02-14    TPro  6.4  /  Alb  3.3  /  TBili  1.6<H>  /  DBili  x   /  AST  21  /  ALT  6<L>  /  AlkPhos  103  02-14    Creatinine, Serum: 0.80 mg/dL (02-14-23 @ 06:38)  Creatinine, Serum: 1.04 mg/dL (02-13-23 @ 09:22)  Creatinine, Serum: 0.83 mg/dL (02-12-23 @ 07:04)  Creatinine, Serum: 0.81 mg/dL (02-11-23 @ 07:05)  Creatinine, Serum: 0.73 mg/dL (02-10-23 @ 06:52)  Creatinine, Serum: 0.72 mg/dL (02-09-23 @ 06:55)  Creatinine, Serum: 0.64 mg/dL (02-08-23 @ 06:50)    LIVER FUNCTIONS - ( 14 Feb 2023 06:38 )  Alb: 3.3 g/dL / Pro: 6.4 g/dL / ALK PHOS: 103 U/L / ALT: 6 U/L / AST: 21 U/L / GGT: x           Microbiology: reviewed   Culture - Blood (collected 02-12-23 @ 17:54)  Source: .Blood Blood-Peripheral  Preliminary Report (02-13-23 @ 22:02):    No growth to date.    Culture - Blood (collected 02-12-23 @ 17:26)  Source: .Blood Blood-Peripheral  Preliminary Report (02-13-23 @ 22:02):    No growth to date.    Culture - Urine (collected 02-01-23 @ 09:57)  Source: Clean Catch Clean Catch (Midstream)  Final Report (02-04-23 @ 07:54):    >100,000 CFU/ml Enterococcus faecium (vancomycin resistant)  Organism: Enterococcus faecium (vancomycin resistant) (02-04-23 @ 07:54)  Organism: Enterococcus faecium (vancomycin resistant) (02-04-23 @ 07:54)      -  Ampicillin: R >8 Predicts results to ampicillin/sulbactam, amoxacillin-clavulanate and  piperacillin-tazobactam.      -  Ciprofloxacin: R >2      -  Daptomycin: N/A >4      -  Levofloxacin: R >4      -  Linezolid: S 2      -  Nitrofurantoin: S <=32 Should not be used to treat pyelonephritis.      -  Tetracycline: R >8      -  Vancomycin: R >16      Method Type: JIM    Culture - Blood (collected 02-01-23 @ 09:35)  Source: .Blood Blood-Peripheral  Final Report (02-06-23 @ 13:01):    No Growth Final    Culture - Blood (collected 02-01-23 @ 09:20)  Source: .Blood Blood-Peripheral  Final Report (02-06-23 @ 13:01):    No Growth Final    Culture - Urine (collected 02-01-23 @ 02:04)  Source: Clean Catch Clean Catch (Midstream)  Final Report (02-03-23 @ 19:05):    >100,000 CFU/ml Enterococcus faecium (vancomycin resistant)  Organism: Enterococcus faecium (vancomycin resistant) (02-03-23 @ 19:05)  Organism: Enterococcus faecium (vancomycin resistant) (02-03-23 @ 19:05)      -  Ampicillin: R >8 Predicts results to ampicillin/sulbactam, amoxacillin-clavulanate and  piperacillin-tazobactam.      -  Ciprofloxacin: R >2      -  Daptomycin: N/A >4      -  Levofloxacin: R >4      -  Linezolid: S 2      -  Nitrofurantoin: S <=32 Should not be used to treat pyelonephritis.      -  Tetracycline: R >8      -  Vancomycin: R >16      Method Type: JIM    Radiology: reviewed     Medications:  acetaminophen     Tablet .. 650 milliGRAM(s) Oral every 6 hours PRN  aluminum hydroxide/magnesium hydroxide/simethicone Suspension 30 milliLiter(s) Oral every 4 hours PRN  benzocaine/menthol Lozenge 1 Lozenge Oral every 8 hours PRN  Biotene Dry Mouth Oral Rinse 15 milliLiter(s) Swish and Spit four times a day  budesonide 160 MICROgram(s)/formoterol 4.5 MICROgram(s) Inhaler 2 Puff(s) Inhalation two times a day  chlorhexidine 2% Cloths 1 Application(s) Topical daily  cholecalciferol 2000 Unit(s) Oral daily  citalopram 10 milliGRAM(s) Oral daily  decitabine IVPB (eMAR) 30 milliGRAM(s) IV Intermittent every 24 hours  FIRST- Mouthwash  BLM 10 milliLiter(s) Swish and Spit four times a day  fluticasone propionate 50 MICROgram(s)/spray Nasal Spray 1 Spray(s) Both Nostrils two times a day  folic acid 1 milliGRAM(s) Oral daily  guaiFENesin  milliGRAM(s) Oral every 12 hours  influenza  Vaccine (HIGH DOSE) 0.7 milliLiter(s) IntraMuscular once  ipratropium    for Nebulization 500 MICROGram(s) Nebulizer every 6 hours  lactobacillus acidophilus 1 Tablet(s) Oral daily  melatonin 3 milliGRAM(s) Oral at bedtime PRN  metoprolol tartrate 25 milliGRAM(s) Oral two times a day  mirtazapine 7.5 milliGRAM(s) Oral daily  ondansetron Injectable 8 milliGRAM(s) IV Push every 24 hours  ondansetron Injectable 4 milliGRAM(s) IV Push every 8 hours PRN  pantoprazole    Tablet 40 milliGRAM(s) Oral before breakfast  polyethylene glycol 3350 17 Gram(s) Oral daily PRN  predniSONE   Tablet 20 milliGRAM(s) Oral daily  senna 1 Tablet(s) Oral daily  sodium chloride 0.65% Nasal 2 Spray(s) Both Nostrils every 6 hours  trimethoprim  160 mG/sulfamethoxazole 800 mG 1 Tablet(s) Oral daily    Current Antimicrobials:  trimethoprim  160 mG/sulfamethoxazole 800 mG 1 Tablet(s) Oral daily    Prior/Completed Antimicrobials:  cefepime   IVPB  nitrofurantoin monohydrate/macrocrystals (MACROBID)  vancomycin  IVPB

## 2023-02-14 NOTE — PROGRESS NOTE ADULT - ASSESSMENT
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 Hematology consulted for further work up.        # New diagnosis MDS with EB2    - CBC at admission with anemia of 5.9; platelet count of 68, wbc of 45.18. Review of records, patient with anemia since at least october 2022, however thrombocytopenia and leukocytosis is new.    - Received pRBC transfusion and responded appropriately, platelets downtrending ~30k   - Iron panel consistent AOCD; Ferritin elevated at 5768; B12 elevated, folate>20    - CT C/A/P with contrast without signs of LAD however, Multiple ill-defined opacities are noted within both lungs, more so in the right lower lobe. Exact etiology is unclear. B/L pleural effusions R>L   - RVP/covid negative,    - peripheral smear reviewed by hematology team during the initial consultation after this admission: no blasts, but immature WBCs noted, no schistocytes, thrombocytopenia noted    - Bone marrow biopsy 1/23/2023; Pathology consistent with MDS- EB 10-15% blasts .   - Hematology team discussed with her hematologist Dr. Madrigal at [facility]: plan is for single agent HMA for now; once follows up post rehab, can consider add on Dave. Rheumatology still considering rituximab; the initial Plan was to give Rituximab then HMA (azacytidine vs dacogen) once ID clears.   -hematology team communicated with Dr. Madrigal and recommended starting HMA Dacogen (decitabine) x 5 days; planning to start Day 1 on 2/10/23.  -Chemo consent signed by pt and put in the chart. hematology team called pt's son at [facility]  [phone] to discuss the situation, and her son understands and agrees with the treatment plan.   - bacteremia found on 1/31 blood culx (Staph epidermitis with methicillin resistance)  s/p meropenem and Vanco; now on nitrofurantion until 2/9   -left side Epistaxis found and ENT f/u, nostril pack removed 2/4 per note ; transfusions for plts <10 if afebrile, <15 if febrile, 50 if bleeding, hbg <7  -Transfuse today pRBC and plt  - Please monitor CBC with DIFF and TLS labs (CMP, Mg, Phos, LDH, uric acid) daily       # Hx of DLBCL EBV+   - Follows with Dr Madrigal, s/p R-mini-chop in 2021; Recent bone marrow biopsy for new anemia in Nov /2022 did not show any disease recurrence.    - repeat imaging this admission without signs of LAD   - Decitabine 20mg/m2 daily, Day 5/5  - Monitor TLS labs (CMP, Mg, Phos, LDH, uric acid) daily. If UA>5 and less 8 can start allopurinol 300mg daily. If UA>8 can give rasburicase 3mg once +IVF        # ANCA vasculitis:    - Followed by rheum    - On chronic steroids - 20mg prednisone; on bactrim DS 1 tab daily for ppx  -No contraindication to rituximab     Please page with questions or concerns. Will Follow with you.      Stanislav Holland M.D.  Hematology/Oncology Fellow PGY5  Pager [phone]  After 5pm, please contact on-call team.

## 2023-02-14 NOTE — PROGRESS NOTE ADULT - ATTENDING COMMENTS
82-yr-old woman with h/o DLBCL in 2021 treated with mini-RCHOP admitted weakness and lethargy noted to have leukocytosis, anemia and thrombocytopenia found to have MDS-EB2. 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 Patient is on C1 HMA (Decitabine), D4. Will complete C1 tomorrow. Patient can also be a candidate for Lenalidomide. She also has ANCA vasculitis and is planned to have RTXN by Rheumatology. Monitor counts, Abx ppx, supportive transfusion as needed.

## 2023-02-15 NOTE — PROGRESS NOTE ADULT - ASSESSMENT
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 Reports chronic cough, currently nonproductive - RVP/COVID negative. Has multiple episodes of loose stools x weeks, reported recently trying marijuana for appetite and noted worsening. She was given oral vancomycin empirically for C.diff but then became constipated, s/p bowel regimen and having normal bowel movements. Patient initially being monitored off antibiotics given she was afebrile, WBC was stable and no infectious source was found. She had a fever 100.9F on 1/30 overnight with worsening leukocytosis and then 101.4F overnight 1/31 and noted with confusion and found to have sepsis due to GP bacteremia.   H/o PCN allergy with hives    Fever   - febrile x1 2/12, pt asx, no further fevers, WBC trend noted  - RVP/COVID negative   - CXR with no focal infiltrate/consolidation seen  - L chest port accessed with no s/o infection  - no new focal findings on exam  - blood cultures NGTD x2  - continue to monitor off abx other than bactrim ppx  - monitor temps/CBC    Sepsis due to gram positive bacteremia, cleared and treated    E. faecalis bacteremia - unclear source, ?GI, less likely    Staph epi bacteremia ?skin vs port source vs contamination  AMS - neurotoxicity d/t cefepime vs infectious etiology   --d/c'ed cefepime 1/31, mental status improved   - 1/30 Bcx (1 set sent) with Staph epi - MRSE -- sensitivities noted   - 1/31 Bcx (1 set sent) - aerobic bottle grew E. faecalis (amp/vanc sensitive) and MRSE  - 2/1 repeat Bcx -- Negative x2   - TTE w/o mention of vegetations  - s/p vancomycin-completed 10d course 2/10    UTI with E. 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  [facility], Division of Infectious Diseases  [phone]  After 5pm on weekdays and all day on weekends - please call [phone]

## 2023-02-15 NOTE — PROGRESS NOTE ADULT - SUBJECTIVE AND OBJECTIVE BOX
SUBJECTIVE/ OVERNIGHT EVENTS:  stable overall.  plts 4, 1 unit Plts transfused  family at bedside.  awake alert. comfortable.  denied pain.  no n/v/d.   poor appetite      --------------------------------------------------------------------------------------------  LABS:                        8.2    42.40 )-----------( 4        ( 15 Feb 2023 07:42 )             24.8     02-15    137  |  102  |  30<H>  ----------------------------<  149<H>  2.9<LL>   |  21<L>  |  0.65    Ca    8.5      15 Feb 2023 07:42  Phos  3.0     02-15  Mg     1.8     02-15    TPro  6.1  /  Alb  3.4  /  TBili  1.0  /  DBili  x   /  AST  18  /  ALT  6<L>  /  AlkPhos  104  02-15      CAPILLARY BLOOD GLUCOSE                RADIOLOGY & ADDITIONAL TESTS:    Imaging Personally Reviewed:  [x] YES  [ ] NO    Consultant(s) Notes Reviewed:  [x] YES  [ ] NO    MEDICATIONS  (STANDING):  Biotene Dry Mouth Oral Rinse 15 milliLiter(s) Swish and Spit four times a day  budesonide 160 MICROgram(s)/formoterol 4.5 MICROgram(s) Inhaler 2 Puff(s) Inhalation two times a day  chlorhexidine 2% Cloths 1 Application(s) Topical daily  cholecalciferol 2000 Unit(s) Oral daily  citalopram 10 milliGRAM(s) Oral daily  FIRST- Mouthwash  BLM 10 milliLiter(s) Swish and Spit four times a day  fluticasone propionate 50 MICROgram(s)/spray Nasal Spray 1 Spray(s) Both Nostrils two times a day  folic acid 1 milliGRAM(s) Oral daily  guaiFENesin  milliGRAM(s) Oral every 12 hours  influenza  Vaccine (HIGH DOSE) 0.7 milliLiter(s) IntraMuscular once  ipratropium    for Nebulization 500 MICROGram(s) Nebulizer every 6 hours  lactobacillus acidophilus 1 Tablet(s) Oral daily  metoprolol tartrate 25 milliGRAM(s) Oral two times a day  mirtazapine 7.5 milliGRAM(s) Oral daily  pantoprazole    Tablet 40 milliGRAM(s) Oral before breakfast  potassium chloride   Solution 40 milliEquivalent(s) Oral every 4 hours  predniSONE   Tablet 20 milliGRAM(s) Oral daily  senna 1 Tablet(s) Oral daily  sodium bicarbonate 650 milliGRAM(s) Oral three times a day  sodium chloride 0.65% Nasal 2 Spray(s) Both Nostrils every 6 hours  trimethoprim  160 mG/sulfamethoxazole 800 mG 1 Tablet(s) Oral daily    MEDICATIONS  (PRN):  acetaminophen     Tablet .. 650 milliGRAM(s) Oral every 6 hours PRN Temp greater or equal to 38C (100.4F), Mild Pain (1 - 3)  aluminum hydroxide/magnesium hydroxide/simethicone Suspension 30 milliLiter(s) Oral every 4 hours PRN Dyspepsia  benzocaine/menthol Lozenge 1 Lozenge Oral every 8 hours PRN Sore Throat  melatonin 3 milliGRAM(s) Oral at bedtime PRN Insomnia  ondansetron Injectable 4 milliGRAM(s) IV Push every 8 hours PRN Nausea and/or Vomiting  polyethylene glycol 3350 17 Gram(s) Oral daily PRN Constipation      Care Discussed with Consultants/Other Providers [x] YES  [ ] NO    Vital Signs Last 24 Hrs  T(C): 36.8 (15 Feb 2023 11:10), Max: 36.8 (15 Feb 2023 04:49)  T(F): 98.3 (15 Feb 2023 11:10), Max: 98.3 (15 Feb 2023 04:49)  HR: 100 (15 Feb 2023 11:10) (78 - 100)  BP: 107/61 (15 Feb 2023 11:10) (104/61 - 126/72)  BP(mean): --  RR: 18 (15 Feb 2023 11:10) (18 - 18)  SpO2: 94% (15 Feb 2023 11:10) (92% - 97%)    Parameters below as of 15 Feb 2023 11:10  Patient On (Oxygen Delivery Method): nasal cannula  O2 Flow (L/min): 2    I&O's Summary    14 Feb 2023 07:01  -  15 Feb 2023 07:00  --------------------------------------------------------  IN: 756 mL / OUT: 1200 mL / NET: -444 mL        PHYSICAL EXAM:  GENERAL: NAD, well-developed, comfortable on room air  HEAD:  Atraumatic, Normocephalic  EYES: EOMI, PERRLA, conjunctiva and sclera clear  NECK: Supple, No JVD  CHEST/LUNG: mild decrease breath sounds bilaterally; No wheeze   HEART: Regular rate and rhythm; No murmurs, rubs, or gallops  ABDOMEN: Soft, Nontender, Nondistended; Bowel sounds present  Neuro: awake alert, back to baseline mental status. no focal weakness  EXTREMITIES:  2+ Peripheral Pulses, No clubbing, cyanosis, trace b/l edema  SKIN: superficial ecchymoses.  lower lip hematoma, no bleeding

## 2023-02-15 NOTE — PROGRESS NOTE ADULT - SUBJECTIVE AND OBJECTIVE BOX
INTERVAL HPI/OVERNIGHT EVENTS:  Patient lying in bed. Denies fevers, chills, chest pain, shortness of breath, abdominal pain, n/v/d.     MEDICATIONS  (STANDING):  Biotene Dry Mouth Oral Rinse 15 milliLiter(s) Swish and Spit four times a day  budesonide 160 MICROgram(s)/formoterol 4.5 MICROgram(s) Inhaler 2 Puff(s) Inhalation two times a day  chlorhexidine 2% Cloths 1 Application(s) Topical daily  cholecalciferol 2000 Unit(s) Oral daily  citalopram 10 milliGRAM(s) Oral daily  FIRST- Mouthwash  BLM 10 milliLiter(s) Swish and Spit four times a day  fluticasone propionate 50 MICROgram(s)/spray Nasal Spray 1 Spray(s) Both Nostrils two times a day  folic acid 1 milliGRAM(s) Oral daily  guaiFENesin  milliGRAM(s) Oral every 12 hours  influenza  Vaccine (HIGH DOSE) 0.7 milliLiter(s) IntraMuscular once  ipratropium    for Nebulization 500 MICROGram(s) Nebulizer every 6 hours  lactobacillus acidophilus 1 Tablet(s) Oral daily  metoprolol tartrate 25 milliGRAM(s) Oral two times a day  mirtazapine 7.5 milliGRAM(s) Oral daily  pantoprazole    Tablet 40 milliGRAM(s) Oral before breakfast  potassium chloride   Solution 40 milliEquivalent(s) Oral every 4 hours  predniSONE   Tablet 20 milliGRAM(s) Oral daily  senna 1 Tablet(s) Oral daily  sodium bicarbonate 650 milliGRAM(s) Oral three times a day  sodium chloride 0.65% Nasal 2 Spray(s) Both Nostrils every 6 hours  trimethoprim  160 mG/sulfamethoxazole 800 mG 1 Tablet(s) Oral daily    MEDICATIONS  (PRN):  acetaminophen     Tablet .. 650 milliGRAM(s) Oral every 6 hours PRN Temp greater or equal to 38C (100.4F), Mild Pain (1 - 3)  aluminum hydroxide/magnesium hydroxide/simethicone Suspension 30 milliLiter(s) Oral every 4 hours PRN Dyspepsia  benzocaine/menthol Lozenge 1 Lozenge Oral every 8 hours PRN Sore Throat  melatonin 3 milliGRAM(s) Oral at bedtime PRN Insomnia  ondansetron Injectable 4 milliGRAM(s) IV Push every 8 hours PRN Nausea and/or Vomiting  polyethylene glycol 3350 17 Gram(s) Oral daily PRN Constipation        Vital Signs Last 24 Hrs  T(C): 36.8 (15 Feb 2023 11:10), Max: 36.8 (15 Feb 2023 04:49)  T(F): 98.3 (15 Feb 2023 11:10), Max: 98.3 (15 Feb 2023 04:49)  HR: 100 (15 Feb 2023 11:10) (78 - 100)  BP: 107/61 (15 Feb 2023 11:10) (104/61 - 126/72)  BP(mean): --  RR: 18 (15 Feb 2023 11:10) (18 - 18)  SpO2: 94% (15 Feb 2023 11:10) (94% - 97%)    Parameters below as of 15 Feb 2023 11:10  Patient On (Oxygen Delivery Method): nasal cannula  O2 Flow (L/min): 2      PHYSICAL EXAMINATION:  General: No apparent distress, Cachectic   HEENT: EOMI,   CVS: +S1/S2, RRR  Resp: On 2L of ncO2. Crackles b/l  GI: Soft, NT/ND +BS  MSK: no synovitis, joints non-tender; dorsiflexion: 3/5 L  Skin: non blanching multiple purpuric lesions in the lower extremities, arms, and chest; crusted lesions around mouth    LABS:                        8.2    42.40 )-----------( 4        ( 15 Feb 2023 07:42 )             24.8     02-15    137  |  102  |  30<H>  ----------------------------<  149<H>  2.9<LL>   |  21<L>  |  0.65    Ca    8.5      15 Feb 2023 07:42  Phos  3.0     02-15  Mg     1.8     02-15    TPro  6.1  /  Alb  3.4  /  TBili  1.0  /  DBili  x   /  AST  18  /  ALT  6<L>  /  AlkPhos  104  02-15            RADIOLOGY & ADDITIONAL TESTS:

## 2023-02-15 NOTE — PROGRESS NOTE ADULT - ASSESSMENT
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 Patient was sent to Rehab. Subsequently on 1/18 patient is readmitted for general weakness w/hb 5. Rheumatology was consulted for further help in management.    #AAV-MPO  -Patient w/hx of mononeuritis multiplex, kidney  and lung involvement,  -Previous treatment with RTX, transitioned to AZA and off any therapy while receiving R-CHOP   -CT chest IN 11/2022 Increased mediastinal lymphadenopathy. A reference low right paratracheal node measures 2.4 x 1.8 cm (2, 39), interval increase in size from prior when it measured 2.0 x 1.2 cm. -11/10/22 R wedge resection showed capillaritis with fibrin, giant cells and inflammation around small and intermediate sized vessels. Elastic stain showed vasculocentricity of the inflammation. 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 Patient was sent to Rehab. Subsequently on 1/18 patient is readmitted for general weakness w/hb 5. Rheumatology was consulted for further help in management.    #AAV-MPO  -Patient w/hx of mononeuritis multiplex, kidney  and lung involvement,  -Previous treatment with RTX, transitioned to AZA and off any therapy while receiving R-CHOP   -CT chest IN 11/2022 Increased mediastinal lymphadenopathy. A reference low right paratracheal node measures 2.4 x 1.8 cm (2, 39), interval increase in size from prior when it measured 2.0 x 1.2 cm. -11/10/22 R wedge resection showed capillaritis with fibrin, giant cells and inflammation around small and intermediate sized vessels. Elastic stain showed vasculocentricity of the inflammation. 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    #MDS with excess blasts   - completed first round with Dacogen      Plan  -Patient  on 2L of ncO2; monitor respiratory status  -c/w 20 mg prednisone daily  -Hematology recs appreciated: no contraindications to Rituximab  -will not pursue Rituximab at this time due to transfusion dependent thrombocytopenia  patient cachectic and weak  -will check again with family and Hematology regarding severity of her current illness of MDS in the context of further MDS txt coupled with further immunosuppression with Rituximab and resultant increase in infectious risk   -repeat ANCA   would consider repeat CT chest if patient stable enough to assess progression  she has been stable from a respiratory perspective   -c/w PCP prophylaxis and PPI ppx     Will follow    Discussed with Dr. Aguila Barron, Attending    Jesus Ritter MD  Rheumatology Fellow, PGY-5  Available on TEAMS

## 2023-02-15 NOTE — PROGRESS NOTE ADULT - SUBJECTIVE AND OBJECTIVE BOX
[facility]   MD RONEL FRIAS MD, PA KRISTINE SOLTANPOUR, DO INJUNG KO, NP    TEL:  OFFICE: [phone]    From 5pm-7am Answering Service [phone]    -- RENAL FOLLOW UP NOTE ---Date of Service 02-15-23 @ 14:32    Patient is a 82y old  Female who presents with a chief complaint of weakness (15 Feb 2023 10:54)      Patient seen and examined at bedside. No chest pain/sob    VITALS:  T(F): 98.3 (02-15-23 @ 11:10), Max: 98.3 (02-15-23 @ 04:49)  HR: 100 (02-15-23 @ 11:10)  BP: 107/61 (02-15-23 @ 11:10)  RR: 18 (02-15-23 @ 11:10)  SpO2: 94% (02-15-23 @ 11:10)  Wt(kg): --    02-14 @ 07:01  -  02-15 @ 07:00  --------------------------------------------------------  IN: 756 mL / OUT: 1200 mL / NET: -444 mL          PHYSICAL EXAM:  Constitutional: NAD  Neck: No JVD  Respiratory: CTAB, no wheezes, rales or rhonchi  Cardiovascular: S1, S2, RRR  Gastrointestinal: BS+, soft, NT/ND  Extremities: No peripheral edema    Hospital Medications:   MEDICATIONS  (STANDING):  Biotene Dry Mouth Oral Rinse 15 milliLiter(s) Swish and Spit four times a day  budesonide 160 MICROgram(s)/formoterol 4.5 MICROgram(s) Inhaler 2 Puff(s) Inhalation two times a day  chlorhexidine 2% Cloths 1 Application(s) Topical daily  cholecalciferol 2000 Unit(s) Oral daily  citalopram 10 milliGRAM(s) Oral daily  FIRST- Mouthwash  BLM 10 milliLiter(s) Swish and Spit four times a day  fluticasone propionate 50 MICROgram(s)/spray Nasal Spray 1 Spray(s) Both Nostrils two times a day  folic acid 1 milliGRAM(s) Oral daily  guaiFENesin  milliGRAM(s) Oral every 12 hours  influenza  Vaccine (HIGH DOSE) 0.7 milliLiter(s) IntraMuscular once  ipratropium    for Nebulization 500 MICROGram(s) Nebulizer every 6 hours  lactobacillus acidophilus 1 Tablet(s) Oral daily  metoprolol tartrate 25 milliGRAM(s) Oral two times a day  mirtazapine 7.5 milliGRAM(s) Oral daily  pantoprazole    Tablet 40 milliGRAM(s) Oral before breakfast  potassium chloride   Solution 40 milliEquivalent(s) Oral every 4 hours  potassium chloride  10 mEq/100 mL IVPB 10 milliEquivalent(s) IV Intermittent every 1 hour  predniSONE   Tablet 20 milliGRAM(s) Oral daily  senna 1 Tablet(s) Oral daily  sodium bicarbonate 650 milliGRAM(s) Oral three times a day  sodium chloride 0.65% Nasal 2 Spray(s) Both Nostrils every 6 hours  trimethoprim  160 mG/sulfamethoxazole 800 mG 1 Tablet(s) Oral daily      LABS:  02-15    137  |  102  |  30<H>  ----------------------------<  149<H>  2.9<LL>   |  21<L>  |  0.65    Ca    8.5      15 Feb 2023 07:42  Phos  3.0     02-15  Mg     1.8     02-15    TPro  6.1  /  Alb  3.4  /  TBili  1.0  /  DBili      /  AST  18  /  ALT  6<L>  /  AlkPhos  104  02-15    Creatinine Trend: 0.65 <--, 0.80 <--, 1.04 <--, 0.83 <--, 0.81 <--, 0.73 <--, 0.72 <--    Albumin, Serum: 3.4 g/dL (02-15 @ 07:42)  Phosphorus Level, Serum: 3.0 mg/dL (02-15 @ 07:42)                              8.2    42.40 )-----------( 4        ( 15 Feb 2023 07:42 )             24.8     Urine Studies:  Urinalysis - [02-01-23 @ 09:57]      Color Yellow / Appearance Slightly Turbid / SG 1.033 / pH 7.0      Gluc Negative / Ketone Trace  / Bili Negative / Urobili Negative       Blood Small / Protein 300 mg/dL / Leuk Est Negative / Nitrite Negative      RBC 4 /  / Hyaline 4 / Gran  / Sq Epi  / Non Sq Epi 10 / Bacteria Moderate      Iron 152, TIBC 180, %sat 84      [01-19-23 @ 11:19]  Ferritin 5768      [01-19-23 @ 11:19]  Vitamin D (25OH) 28.1      [01-19-23 @ 11:19]  Lipid: chol 84, TG 81, HDL 22, LDL --      [10-01-22 @ 07:10]    HBsAg Nonreact      [01-25-23 @ 07:18]  HBcAb Nonreact      [01-25-23 @ 07:18]  HCV 0.15, Nonreact      [01-25-23 @ 07:18]    MPO-ANCA <5.0, interpretation: Negative      [02-02-23 @ 07:22]  PR3-ANCA <5.0, interpretation: Negative      [02-02-23 @ 07:22]  Free Light Chains: kappa 4.65, lambda 2.60, ratio = 1.79      [01-25 @ 07:18]    RADIOLOGY & ADDITIONAL STUDIES:

## 2023-02-15 NOTE — PROGRESS NOTE ADULT - ASSESSMENT
82 yr old female with a PMHx of diffuse large B cell lymphoma in remission, non-hodgkin's lymphoma (chemoport), depression, HLD, GERD, PE/DVT (4/2021 no longer on Eliquis), ANCA-vasculitis (20 y/a, no meds), s/p LVATS, LUIS wedge resection (2021), recent direct admit for RVATS, RUL Nodule Biopsy w/ IR marking in [facility], path favoring vasculitis, treated with Decadron, then switched to PO prednisone, went to [facility]. She presented here from [facility] for elevated WBC and low hemoglobin, severe weakness. Pt states for the past 2-3 days has been having increased weakness and lethargy, decreased appetite. +sputum productive cough. + cui, no sob, no difficulty breathing. No abdominal pain, nausea, vomiting, no bloody stools. Has endorsed multiple episodes of loose stools x weeks, currently being treated for c.diff with oral vancomycin.     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 no BRBPR. occult stool neg.   - she tends to require IV Lasix intermittently post transfusion.  - doubt GI bleed   - Physical therapy. Out of bed to chair with assistance.     Diarrhea, unspecified  - elevated WBC  - no documented c.diff PCR, re-ordered.  - s/p Vanco PO a few days (started in rehab)  - diarrhea completely resolved.   - monitor off PO vanco per ID  - now constipated. PRN bowel regimen started. +BM    Fever, resolved.   - RVP 1/30/23 (-)  - monitor blood cxs 1/30/23  - started empirically on cefepime 1/30/23, switched to merrem 1/31/23 --> 2/3/23  - enterrocus bacteremia, abx per ID. on IV Vanco, last day was 2/10/23  - Macrobid x 5 days course added for VRE in urine, last day was 2/8/23  - blood cultures now negative     AMS  - unclear etiology, likely metabolic encephalopathy from ?Cefepime? received 3 doses, last dose 1/31/23  - CT head neg. sugars stable  - close monitoring   - monitor glucose (glucose 65 on BMP, but 95 on fingersticks)  - encourage PO intake   - mental status improved    Pulmonary vasculitis   - Recent TTE on 11/3/22 reviewed: normal LV. outpt card Dr. Ady Cooper  - outpt pulm Mack Tucker   - s/p thoracoscopic biopsy of mediastinal lymph node and Lung wedge resection 11/10/22  - recent pleural effusion s/p 650 cc U/S-guided rt thoracentesis 11/17/22  - exudative but no neutrophilic predominance and initial Gram stain w/o organisms  - cultures neg.   - path results favoring vasculitis: no evidence for lymphoma. Cytology also came back negative.   - comfortable on nasal canula, better post diuretic last admission.   - rheum and heme-onc following previously, will reconsult.   - last admission, she was not started on immunosuppressants such as cellcept given recent treatment for COVID19 at that time  - c/w prednisone 20 mg qdaily.   - RTX under consideration after administration of dacogen --> acute hepatitis panel (-) and quantiferon indeterminate  - ID started PCP ppx with Bactrim.     hx of Tachycardia    - cont low-dose metoprolol, 50 mg to 25 mg dose reduced in rehab.   - card consult (Dr. Hooker) in the past, if needed    Anxiety and depression  - stable, continue citalopram and Remeron.  - Pt denied SI/HI ideations, denied visual and auditory hallucinations.     GERD (gastroesophageal reflux disease)  - continue PPI    Hyperlipidemia.   - continue home ezetimibe. okay to hold if nonformulary     Hyponatremia 127 (hypovolemic?)  - renal on the case, resolved.   - s/p 3 days of IV lasix, now completed. electrolytes supplemented.  - O2 weaned off from ventimask to nascal canula to room air.     DVT ppx   - holding AC in the setting of anemia, pancytopenia.   - venodyne boots LEs

## 2023-02-15 NOTE — PROGRESS NOTE ADULT - ASSESSMENT
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 She presented here from [facility] for elevated WBC and low hemoglobin, severe weakness. Pt states for the past 2-3 days has been having increased weakness and lethargy, decreased appetite. +sputum productive cough. + cui, no sob, no difficulty breathing. No abdominal pain, nausea, vomiting, no bloody stools. Has endorsed multiple episodes of loose stools x weeks, currently being treated for c.diff with oral vancomycin.       Fatigue/dyspnea: likely due to severe anemia :  pt s/p 1 unit PRB  Diarrhea  Pulmonary vasculitis  :  hx of Tachycardia :  Recent TTE on 11/3/22 reviewed: normal LV. outpt card Dr. Ady Cooper  Anxiety and depression  GERD (gastroesophageal reflux disease)  Hyperlipidemia.   DVT ppx     1/20:  Fatigue/dyspnea: likely due to severe anemia :  pt s/p 1 unit PRBC: hb now  > 10  : she is on 2 L of oxygen : no wheezing: no overt signs of bleeding : ct chest is abnormal report noted:  has jean-claude ill defined opacities of unclear etiology  : venous ph is mild acidotic:  no need for Bipap at this time : monitor and trend hb  Diarrhea : symptomatic rx:  workup per primary team  Pulmonary vasculitis  : per rheumatology  : had recent vats surgery : LN biopsy noted:   hx of Tachycardia :  Recent TTE on 11/3/22 reviewed: normal LV. outpt card Dr. Ady Cooper  Anxiety and depression  GERD (gastroesophageal reflux disease) : ppi   Hyperlipidemia.   recent covid  : o n last admission she was treated for covid infection:   DVT ppx   d wteam    1/22:    Fatigue/dyspnea: likely due to severe anemia :  pt s/p 1 unit PRBC: hb now  > 10  : she is on 2 L of oxygen : no wheezing: no overt signs of bleeding : ct chest is abnormal report noted:  has jean-claude ill defined opacities of unclear etiology  : venous ph is mild acidotic:  no need for Bipap at this time : monitor and trend hb: she remained stable o gracie last 1 days:  she is not on any antibtiocs at this time: RVP  and blood cultures are negative: she had ebus biopsy also before: reviewed: ID following  Diarrhea : symptomatic rx:  workup per primary team : seems to have resolved  Pulmonary vasculitis  : per rheumatology  : had recent vats surgery : LN biopsy noted:   Bicytopneia and leucocytosis: ? etiology  : for possible bone marrow biopsy on Monday    hx of Tachycardia :  Recent TTE on 11/3/22 reviewed: normal LV. outpt card Dr. Ady Cooper  Anxiety and depression  GERD (gastroesophageal reflux disease) : ppi   Hyperlipidemia.   recent covid  : on last admission she was treated for covid infection:   DVT ppx   dw team    1/23:    Fatigue/dyspnea: likely due to severe anemia : feels weak  but no bleeding noted:  on 2 L of oxygen : she needs PT OT   Diarrhea :resolved:   Pulmonary vasculitis  : per rheumatology  : had recent vats surgery : LN biopsy noted: : she never received teat ment for vasculitis except low dose steroids:  she is awaiting bone marrow biopsy prior to induction therapy with rituximab: The ct chest done on this admission does not show pneumothorax and has jean-claude effusions:  There are some new opacites in rigth lower lobe which were not therre:  clinically she does not seem to have pneumonia: ID following: could it be a prt of vasculitis  Bicytopneia and leucocytosis: ? etiology  : for possible bone marrow biopsy today  hx of Tachycardia :  Recent TTE on 11/3/22 reviewed: normal LV. outpt card Dr. Ady Cooper  Anxiety and depression  GERD (gastroesophageal reflux disease) : ppi   Hyperlipidemia.   recent covid  : on last admission she was treated for covid infection:   DVT ppx   dw team    1/24:    Fatigue/dyspnea: likely due to severe anemia : feels weak  but no bleeding noted:  on 2 L of oxygen : she needs PT OT   Diarrhea :resolved:   Pulmonary vasculitis  : per rheumatology  : had recent vats surgery : LN biopsy noted: : she never received teat ment for vasculitis except low dose steroids:  she is awaiting bone marrow biopsy prior to induction therapy with rituximab: The ct chest done on this admission does not show pneumothorax and has jean-claude effusions:  There are some new opacites in rigth lower lobe which were not therre:  clinically she does not seem to have pneumonia: ID following: could it be a prt of vasculitis  Bicytopneia and leucocytosis: BM biopsy done: await results for eventual immunosuppressives therapy:   hx of Tachycardia :  Recent TTE on 11/3/22 reviewed: normal LV. outpt card Dr. Ady Cooper  Anxiety and depression  GERD (gastroesophageal reflux disease) : ppi   Hyperlipidemia.   recent covid  : on last admission she was treated for covid infection:   DVT ppx   dw team    1/25:    Fatigue/dyspnea: likely due to severe anemia : feels weak  but no bleeding noted:  on 2 L of oxygen : she needs PT OT   Diarrhea :resolved:   Pulmonary vasculitis  : per rheumatology  : had recent vats surgery : LN biopsy noted: : she never received teat ment for vasculitis except low dose steroids:  she is awaiting bone marrow biopsy prior to induction therapy with rituximab: The ct chest done on this admission does not show pneumothorax and has jean-claude effusions:  There are some new opacites in rigth lower lobe which were not there:  clinically she does not seem to have pneumonia: ID following: could it be a part of vasculitis : her resp status has not changed   Bicytopneia and leucocytosis: BM biopsy done: await results for still pending   hx of Tachycardia :  Recent TTE on 11/3/22 reviewed: normal LV. outpt card Dr. Ady Cooper  Anxiety and depression  GERD (gastroesophageal reflux disease) : ppi   Hyperlipidemia.   recent covid  : on last admission she was treated for covid infection:   DVT ppx   dw team: awaiting decision on immunosuppression     1/26;      Fatigue/dyspnea: likely due to severe anemia : feels weak  but no bleeding noted:  on 2 L of oxygen : she needs PT OT : got it yesterday    Diarrhea :resolved:   Pulmonary vasculitis  : per rheumatology  : had recent vats surgery : LN biopsy noted: : she never received teat ment for vasculitis except low dose steroids:  she is awaiting bone marrow biopsy prior to induction therapy with rituximab: The ct chest done on this admission does not show pneumothorax and has jean-claude effusions:  There are some new opacities in rigth lower lobe which were not there:  clinically she does not seem to have pneumonia: ID following: could it be a part of vasculitis : her resp status has not changed  : being observed off antibiotics   Bicytopneia and leucocytosis: BM biopsy done: await results for still pending   hx of Tachycardia :  Recent TTE on 11/3/22 reviewed: normal LV. outpt card Dr. Ady Cooper  Anxiety and depression  GERD (gastroesophageal reflux disease) : ppi   Hyperlipidemia.   recent covid  : on last admission she was treated for covid infection:   DVT ppx   dw team: awaiting decision on immunosuppression     1/27:    Fatigue/dyspnea: likely due to severe anemia : feels weak  but no bleeding noted:  on 2 L of oxygen : cont  PT OT :   Diarrhea :resolved:   Pulmonary vasculitis  : per rheumatology  : had recent vats surgery : LN biopsy noted: : she never received teat ment for vasculitis except low dose steroids:  she is awaiting bone marrow biopsy prior to induction therapy with rituximab: The ct chest done on this admission does not show pneumothorax and has jean-claude effusions:  There are some new opacities in rigth lower lobe which were not there:  clinically she does not seem to have pneumonia: ID following: could it be a part of vasculitis : her resp status has not changed  : being observed off antibiotics :  on bactrim prophylaxis as she is on chr steroids   Bicytopneia and leucocytosis: BM biopsy done: await results for still pending   hx of Tachycardia :  Recent TTE on 11/3/22 reviewed: normal LV. outpt card Dr. Ady Cooper  Anxiety and depression  GERD (gastroesophageal reflux disease) : ppi   Hyperlipidemia.   recent covid  : on last admission she was treated for covid infection:   DVT ppx   dw team: awaiting decision on immunosuppression     1/29:    Fatigue/dyspnea: likely due to severe anemia : feels weak  but no bleeding noted:  on 2 L of oxygen : cont  PT OT :   Diarrhea :resolved:   Pulmonary vasculitis  : per rheumatology  : had recent vats surgery : LN biopsy noted: : she never received treat ment for vasculitis except low dose steroids:  she is awaiting bone marrow biopsy prior to induction therapy with rituximab: The ct chest done on this admission does not show pneumothorax and has jean-claude effusions:  There are some new opacities in rigth lower lobe which were not there:  clinically she does not seem to have pneumonia: ID following: could it be a part of vasculitis : her resp status has not changed  : being observed off antibiotics :  on bactrim prophylaxis as she is on chr steroids   Bicytopneia and leucocytosis: BM biopsy: results reviewed defer to [facility]  hx of Tachycardia :  Recent TTE on 11/3/22 reviewed: normal LV.   Anxiety and depression  GERD (gastroesophageal reflux disease) : ppi   Hyperlipidemia.   recent covid  : on last admission she was treated for covid infection:   DVT ppx   dw team: awaiting decision on immunosuppression     1/30:    Fatigue/dyspnea: likely due to severe anemia : feels weak  but no bleeding noted:  on 2 L of oxygen : cont  PT OT : sitting in chair today   Diarrhea :resolved:   Pulmonary vasculitis  : per rheumatology  : had recent vats surgery : LN biopsy noted: : she never received treat ment for vasculitis except low dose steroids:  she is awaiting bone marrow biopsy prior to induction therapy with rituximab: The ct chest done on this admission does not show pneumothorax and has jean-claude effusions:  There are some new opacities in rigth lower lobe which were not there:  clinically she does not seem to have pneumonia: ID following: could it be a part of vasculitis : her resp status has not changed  : being observed off antibiotics :  on bactrim prophylaxis as she is on chr steroids  /l however she had low grade tempt today : rvp is negative : cultures sent: ID follow up   Bicytopneia and leucocytosis: BM biopsy: results reviewed defer to [facility]  hx of Tachycardia :  Recent TTE on 11/3/22 reviewed: normal LV.   Anxiety and depression  GERD (gastroesophageal reflux disease) : ppi   Hyperlipidemia.   recent covid  : on last admission she was treated for covid infection:   DVT ppx   dw team: awaiting decision on immunosuppression     1/31:    Fatigue/dyspnea: likely due to severe anemia : feels weak  but no bleeding noted:  on 2 L of oxygen : cont  PT OT : lying in bed:   Diarrhea :resolved:   Pulmonary vasculitis  : per rheumatology  : had recent vats surgery : LN biopsy noted: : she never received treat ment for vasculitis except low dose steroids:  she is awaiting bone marrow biopsy prior to induction therapy with rituximab: The ct chest done on this admission does not show pneumothorax and has jean-claude effusions:  There are some new opacities in rigth lower lobe which were not there:  clinically she does not seem to have pneumonia: ID following: could it be a part of vasculitis : her resp status has not changed  : being observed off antibiotics :  on bactrim prophylaxis as she is on chr steroids : she seems OK:  no sob:     Bicytopneia and leucocytosis: BM biopsy: results reviewed defer to [facility] ; for eventual chemo   hx of Tachycardia :  Recent TTE on 11/3/22 reviewed: normal LV.   Anxiety and depression  GERD (gastroesophageal reflux disease) : ppi   Hyperlipidemia.   recent covid  : on last admission she was treated for covid infection:   DVT ppx   dw team: awaiting decision on immunosuppression     2/1:    Fatigue/dyspnea: likely due to severe anemia : feels weak  but no bleeding noted:  on 2 L of oxygen : cont  PT OT : lying in bed:  her blood cultures are contaminant:   Diarrhea :resolved:   Pulmonary vasculitis  : per rheumatology  : had recent vats surgery : LN biopsy noted: : she never received treat ment for vasculitis except low dose steroids:  she is awaiting bone marrow biopsy prior to induction therapy with rituximab: The ct chest done on this admission does not show pneumothorax and has jean-claude effusions:  There are some new opacities in rigth lower lobe which were not there:  clinically she does not seem to have pneumonia: ID following: could it be a part of vasculitis : her resp status has not changed  : being observed off antibiotics :  on bactrim prophylaxis as she is on chr steroids : she seems OK:  no sob:     Bicytopneia and leucocytosis: BM biopsy: results reviewed defer to [facility] ; for eventual chemo   hx of Tachycardia :  Recent TTE on 11/3/22 reviewed: normal LV.   Anxiety and depression  GERD (gastroesophageal reflux disease) : ppi   Hyperlipidemia.   recent covid  : on last admission she was treated for covid infection:   DVT ppx   dw team: awaiting decision on immunosuppression: overall her general condition seems very poor and very weak:     2/2:    Fatigue/dyspnea: likely due to severe anemia : feels weak  but no bleeding noted:  on 2 L of oxygen : cont  PT OT : lying in bed:  her blood cultures are positive E FAECALIS :  ON MEROPENEM  Diarrhea :resolved:   Pulmonary vasculitis  : per rheumatology  : had recent vats surgery : LN biopsy noted: : she never received treat ment for vasculitis except low dose steroids:  she is awaiting bone marrow biopsy prior to induction therapy with rituximab: The ct chest done on this admission does not show pneumothorax and has jean-claude effusions:  There are some new opacities in rigth lower lobe which were not there:  clinically she does not seem to have pneumonia: ID following: could it be a part of vasculitis : her resp status has not changed  : being observed off antibiotics :  on bactrim prophylaxis as she is on chr steroids : she seems OK:  no sob:     Bicytopneia and leucocytosis: BM biopsy: results reviewed defer to [facility] ; for eventual chemo   hx of Tachycardia :  Recent TTE on 11/3/22 reviewed: normal LV.   Anxiety and depression  GERD (gastroesophageal reflux disease) : ppi   Hyperlipidemia.   recent covid  : on last admission she was treated for covid infection:   DVT ppx   dw team: awaiting decision on immunosuppression: overall her general condition seems very poor and very weak: ON ANTIBIOTICS     2/3   Pulmonary Vasculitis  -Steroids per rheum  -Plan for eventual Rituxan  -Bactrim for PCP ppx  MDS  -Per heme/onc  Bacteremia  -E. Faecalis bacteremia  -ABX per ID  Epistaxis  -Per ENT  -Breathing comfortably on 40% humidified face tent, keep sats >90%    2/6:    2/6  Pulmonary Vasculitis  -Steroids per rheum  -Plan for eventual Rituxan /l she is on room air at this time  -Bactrim for PCP ppx  MDS  -Per heme/onc  Bacteremia  -E. Faecalis bacteremia  -ABX per ID  Epistaxis  -Per ENT  - she is on room air today  : cont to mantain o2 sao2 above 90% all the ti me:     acp      2/7:  Pulmonary Vasculitis  -Steroids per rheum  -Plan for eventual Rituxan /l she is on room air at this time  -Bactrim for PCP ppx  MDS  -Per heme/onc  Bacteremia  -E. Faecalis bacteremia  -ABX per ID : awaiting clearance from id for chemo : last blood cultures have been negative  Epistaxis  -Per ENT  - she is on room air today  : cont to mantain o2 sao2 above 90% all the ti me:     acp    2/8:  Pulmonary Vasculitis  -Steroids per rheum  -Plan for eventual Rituxan /l she is on room air at this time  -Bactrim for PCP ppx  MDS  -Per heme/onc  Bacteremia  -E. Faecalis bacteremia  -ABX per ID : awaiting clearance from id for chemo : last blood cultures have been negative : finishing antbiotics today  :  Epistaxis  -Per ENT  - she is on room air today  : cont to mantain o2 sao2 above 90% all the ti me:  Thrombocytopenia:  sign today  : 14k: no obvious bleeding: cont to monitorial : transfuse per [facility]     acp      2/9:    Pulmonary Vasculitis : Steroids per rheum : Plan for eventual Rituxan /l she is on room air at this time : Bactrim for PCP ppx : on room air: with no change in her resp status  MDS : s'p BM biopsy : has MDS : defer to hemon[facility]  Bacteremia E. Faecalis bacteremia  -ABX per ID : awaiting clearance from id for chemo : last blood cultures have been negative : finished antibiotics   Epistaxis Per ENT : she is on room air today  : cont to mantain o2 sao2 above 90% all the ti me:  Thrombocytopenia:  sign today  : 10k: no obvious bleeding: cont to monitorial : transfuse per [facility] :? for plt transfusion  today      dw acp    2/10:  Pulmonary Vasculitis : Steroids per rheum : Plan for eventual Rituxan /l she is on room air at this time : Bactrim for PCP ppx : on room air: with no change in her resp status : now plan for rituximab aftger 5 days of dacogen   MDS : s'p BM biopsy : has MDS : defer to [facility]mon[facility]  Bacteremia E. Faecalis bacteremia  -ABX per ID : awaiting clearance from id for chemo : last blood cultures have been negative : finished antibiotics   Epistaxis Per ENT : she is on room air today  : cont to mantain o2 sao2 above 90% all the ti me:  Thrombocytopenia:  still low, but much better,  no obvious bleeding: cont to monitorial :  dw acp    2/12:  Pulmonary Vasculitis : Steroids per rheum : Plan for eventual Rituxan /l she is on room air at this time : Bactrim for PCP ppx : on room air: with no change in her resp status : now plan for rituximab after 5 days of dacogen : today is day 3 : doing  ok : no resp idstress  MDS : s'p BM biopsy : has MDS : defer to hemonc  Bacteremia E. Faecalis bacteremia  -ABX per ID : awaiting clearance from id for chemo : last blood cultures have been negative : finished antibiotics   Epistaxis Per ENT : she is on room air today  : cont to mantain o2 sao2 above 90% all the ti me:  Thrombocytopenia:  still low, but much better,  no obvious bleeding: cont to monitor :  dw acp      2/13:  Low grade temperature : pancultured:  chest xray with improvement in infiltrates : id following : no antibiotics yet:   Pulmonary Vasculitis : Steroids per rheum : Plan for eventual Rituxan.  she is on room air at this time : Bactrim for PCP ppx : on 2 L of oxygen today : rpt chest xray noted:   MDS : s'p BM biopsy : has MDS : defer to [facility]mon[facility]  Bacteremia E. Faecalis bacteremia  -ABX per ID : awaiting clearance from id for chemo : last blood cultures have been negative : finished antibiotics   Epistaxis Per ENT : resolved  Thrombocytopenia:  for transfusion today    dw acp : pt looks pretty weak and cachectic:      2/14;  Low grade temperature :resolved  pancultured:  chest xray with improvement in infiltrates : id following : no antibiotics yet:   Pulmonary Vasculitis : Steroids per rheum : Plan for eventual Rituxan.  she is on room air at this time : Bactrim for PCP ppx : on 2 L of oxygen today : rpt chest xray noted:   MDS : s'p BM biopsy : has MDS : defer to [facility]mon[facility]  Bacteremia E. Faecalis bacteremia: resolved:   Epistaxis Per ENT : resolved  Thrombocytopenia:  for transfusion today    dw acp : pt looks pretty weak and cachectic:        2/15:    Low grade temperature :resolved  pancultured:  chest xray with improvement in infiltrates : id following : no antibiotics yet: still   Pulmonary Vasculitis : Steroids per rheum : Plan for eventual Rituxan.  she is on room air at this time : Bactrim for PCP ppx : on 2 L of oxygen today : rpt chest xray noted: for rituximab per rheum:   MDS :with increased blasts:  s'p BM biopsy : has MDS : defer to [facility]mon[facility] : gettng chemo   Bacteremia E. Faecalis bacteremia: resolved:   Epistaxis Per ENT : resolved  Thrombocytopenia:  for transfusion today    dw acp : pt looks pretty weak and cachectic:  :  cont current suppotive care: p lats transfusion

## 2023-02-15 NOTE — PROGRESS NOTE ADULT - SUBJECTIVE AND OBJECTIVE BOX
[facility] INFECTIOUS DISEASES  ANDREW Rodríguez Y. Patel, S. Shah, G. Casimir  [phone]  ([phone] - weekdays after 5pm and weekends)    Name: BETTIE NGUYEN  Age/Gender: 82y Female  MRN: [id number]    Interval History:  Patient seen and examined this morning.   No new complaints  Notes reviewed.   No concerning overnight events.  Afebrile.     Allergies: codeine (Nausea)  doxycycline (Nausea)  penicillin (Hives)    Objective:  Vitals:   T(F): 98.3 (02-15-23 @ 10:15), Max: 98.3 (02-15-23 @ 04:49)  HR: 95 (02-15-23 @ 10:15) (78 - 97)  BP: 104/61 (02-15-23 @ 10:15) (98/57 - 126/72)  RR: 18 (02-15-23 @ 10:15) (18 - 18)  SpO2: 94% (02-15-23 @ 10:15) (92% - 98%)  Physical Examination:  General: no acute distress, NC  HEENT: NC/AT, anicteric, neck supple  Respiratory: decreased breath sounds b/l  Cardiovascular: S1 and S2 present, normal rate  Gastrointestinal: soft, nontender, nondistended  Extremities: no edema, no cyanosis  Skin: warm, dry, no visible rash  Lines: L chest port accessed with no TTP/erythema    Laboratory Studies:  CBC:                       8.2    42.40 )-----------( 4        ( 15 Feb 2023 07:42 )             24.8     WBC Trend:  42.40 02-15-23 @ 07:42  53.70 02-14-23 @ 06:38  61.18 02-13-23 @ 16:41  66.95 02-13-23 @ 09:22  70.94 02-12-23 @ 17:26  62.29 02-12-23 @ 07:05  60.71 02-11-23 @ 07:05  54.66 02-10-23 @ 06:50  49.53 02-09-23 @ 06:55    CMP: 02-15    137  |  102  |  30<H>  ----------------------------<  149<H>  2.9<LL>   |  21<L>  |  0.65    Ca    8.5      15 Feb 2023 07:42  Phos  3.0     02-15  Mg     1.8     02-15    TPro  6.1  /  Alb  3.4  /  TBili  1.0  /  DBili  x   /  AST  18  /  ALT  6<L>  /  AlkPhos  104  02-15    Creatinine, Serum: 0.65 mg/dL (02-15-23 @ 07:42)  Creatinine, Serum: 0.80 mg/dL (02-14-23 @ 06:38)  Creatinine, Serum: 1.04 mg/dL (02-13-23 @ 09:22)  Creatinine, Serum: 0.83 mg/dL (02-12-23 @ 07:04)  Creatinine, Serum: 0.81 mg/dL (02-11-23 @ 07:05)  Creatinine, Serum: 0.73 mg/dL (02-10-23 @ 06:52)  Creatinine, Serum: 0.72 mg/dL (02-09-23 @ 06:55)    LIVER FUNCTIONS - ( 15 Feb 2023 07:42 )  Alb: 3.4 g/dL / Pro: 6.1 g/dL / ALK PHOS: 104 U/L / ALT: 6 U/L / AST: 18 U/L / GGT: x           Microbiology: reviewed   Culture - Blood (collected 02-12-23 @ 17:54)  Source: .Blood Blood-Peripheral  Preliminary Report (02-13-23 @ 22:02):    No growth to date.    Culture - Blood (collected 02-12-23 @ 17:26)  Source: .Blood Blood-Peripheral  Preliminary Report (02-13-23 @ 22:02):    No growth to date.    Radiology: reviewed     Medications:  acetaminophen     Tablet .. 650 milliGRAM(s) Oral every 6 hours PRN  aluminum hydroxide/magnesium hydroxide/simethicone Suspension 30 milliLiter(s) Oral every 4 hours PRN  benzocaine/menthol Lozenge 1 Lozenge Oral every 8 hours PRN  Biotene Dry Mouth Oral Rinse 15 milliLiter(s) Swish and Spit four times a day  budesonide 160 MICROgram(s)/formoterol 4.5 MICROgram(s) Inhaler 2 Puff(s) Inhalation two times a day  chlorhexidine 2% Cloths 1 Application(s) Topical daily  cholecalciferol 2000 Unit(s) Oral daily  citalopram 10 milliGRAM(s) Oral daily  FIRST- Mouthwash  BLM 10 milliLiter(s) Swish and Spit four times a day  fluticasone propionate 50 MICROgram(s)/spray Nasal Spray 1 Spray(s) Both Nostrils two times a day  folic acid 1 milliGRAM(s) Oral daily  guaiFENesin  milliGRAM(s) Oral every 12 hours  influenza  Vaccine (HIGH DOSE) 0.7 milliLiter(s) IntraMuscular once  ipratropium    for Nebulization 500 MICROGram(s) Nebulizer every 6 hours  lactobacillus acidophilus 1 Tablet(s) Oral daily  melatonin 3 milliGRAM(s) Oral at bedtime PRN  metoprolol tartrate 25 milliGRAM(s) Oral two times a day  mirtazapine 7.5 milliGRAM(s) Oral daily  ondansetron Injectable 4 milliGRAM(s) IV Push every 8 hours PRN  pantoprazole    Tablet 40 milliGRAM(s) Oral before breakfast  polyethylene glycol 3350 17 Gram(s) Oral daily PRN  potassium chloride    Tablet ER 40 milliEquivalent(s) Oral every 4 hours  potassium chloride  10 mEq/100 mL IVPB 10 milliEquivalent(s) IV Intermittent every 1 hour  predniSONE   Tablet 20 milliGRAM(s) Oral daily  senna 1 Tablet(s) Oral daily  sodium bicarbonate 650 milliGRAM(s) Oral three times a day  sodium chloride 0.65% Nasal 2 Spray(s) Both Nostrils every 6 hours  trimethoprim  160 mG/sulfamethoxazole 800 mG 1 Tablet(s) Oral daily    Current Antimicrobials:  trimethoprim  160 mG/sulfamethoxazole 800 mG 1 Tablet(s) Oral daily    Prior/Completed Antimicrobials:  cefepime   IVPB  nitrofurantoin monohydrate/macrocrystals (MACROBID)  vancomycin  IVPB

## 2023-02-15 NOTE — PROGRESS NOTE ADULT - ATTENDING COMMENTS
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 Patient completed C1 D5 treatment with HMA. Patient can also be a candidate for Lenalidomide as she has 5q deletion. She also has ANCA vasculitis and is planned to have RTXN by Rheumatology. Monitor counts, Abx ppx, supportive transfusion as needed.

## 2023-02-15 NOTE — PROGRESS NOTE ADULT - ASSESSMENT
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 She presented here from [facility] for elevated WBC and low hemoglobin, severe weakness. Pt states for the past 2-3 days has been having increased weakness and lethargy, decreased appetite. +sputum productive cough. + cui, no sob, no difficulty breathing. No abdominal pain, nausea, vomiting, no bloody stools. Has endorsed multiple episodes of loose stools x weeks, currently being treated for c.diff with oral vancomycin. Nephrology consulted for Hyponatremia.       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

## 2023-02-15 NOTE — PROGRESS NOTE ADULT - NSPROGADDITIONALINFOA_GEN_ALL_CORE
Still requiring frequent transfusions.  close monitoring.    d/w pt and NP.  d/w family at bedside.    - Dr. MARY Arias ([facility])  - [phone]

## 2023-02-15 NOTE — PROGRESS NOTE ADULT - ASSESSMENT
82 year old woman with a PMHx of diffuse large B cell lymphoma in remission 2021 (pt has a chemoport), depression, HLD, GERD, PE/DVT (4/2021 no longer on Eliquis), ANCA-vasculitis (20 y/a, no meds), s/p LVATS, LUIS wedge resection (2021), recent direct admit for RVATS, RUL Nodule Biopsy w/ IR marking in [facility] sent in from [facility] rehab for 2-3 days of lethargy and weakness with productive cough, found to have anemia, thrombocytopenia and leukocytosis. Hematology consulted for further work up.        # New diagnosis MDS with EB2    - CBC at admission with anemia of 5.9; platelet count of 68, wbc of 45.18. Review of records, patient with anemia since at least october 2022, however thrombocytopenia and leukocytosis is new.    - Received pRBC transfusion and responded appropriately, platelets downtrending ~30k   - Iron panel consistent AOCD; Ferritin elevated at 5768; B12 elevated, folate>20    - CT C/A/P with contrast without signs of LAD however, Multiple ill-defined opacities are noted within both lungs, more so in the right lower lobe. Exact etiology is unclear. B/L pleural effusions R>L   - RVP/covid negative,    - peripheral smear reviewed by hematology team during the initial consultation after this admission: no blasts, but immature WBCs noted, no schistocytes, thrombocytopenia noted    - Bone marrow biopsy 1/23/2023; Pathology consistent with MDS- EB 10-15% blasts .   - Hematology team discussed with her hematologist Dr. Madrigal at [facility]: plan is for single agent HMA for now; once follows up post rehab, can consider add on Dave. Rheumatology still considering rituximab; the initial Plan was to give Rituximab then HMA (azacytidine vs dacogen) once ID clears.   -hematology team communicated with Dr. Madrigal and recommended starting HMA Dacogen (decitabine) x 5 days; planning to start Day 1 on 2/10/23.  -Chemo consent signed by pt and put in the chart. hematology team called pt's son at [place]  [phone] to discuss the situation, and her son understands and agrees with the treatment plan.   - bacteremia found on 1/31 blood culx (Staph epidermitis with methicillin resistance)  s/p meropenem and Vanco; now on nitrofurantion until 2/9   -left side Epistaxis found and ENT f/u, nostril pack removed 2/4 per note ; transfusions for plts <10 if afebrile, <15 if febrile, 50 if bleeding, hbg <7  -Transfuse today plts  - Please monitor CBC with DIFF and TLS labs (CMP, Mg, Phos, LDH, uric acid) daily       # Hx of DLBCL EBV+   - Follows with Dr Madrigal, s/p R-mini-chop in 2021; Recent bone marrow biopsy for new anemia in Nov /2022 did not show any disease recurrence.    - repeat imaging this admission without signs of LAD   - Decitabine 20mg/m2 daily, Day 5/5  - Monitor TLS labs (CMP, Mg, Phos, LDH, uric acid) daily. If UA>5 and less 8 can start allopurinol 300mg daily. If UA>8 can give rasburicase 3mg once +IVF        # ANCA vasculitis:    - Followed by rheum    - On chronic steroids - 20mg prednisone; on bactrim DS 1 tab daily for ppx  -No contraindication to rituximab     Please page with questions or concerns. Will Follow with you.      Stanislav Holland M.D.  Hematology/Oncology Fellow PGY5  Pager [phone]  After 5pm, please contact on-call team.   82 year old woman with a PMHx of diffuse large B cell lymphoma in remission 2021 (pt has a chemoport), depression, HLD, GERD, PE/DVT (4/2021 no longer on Eliquis), ANCA-vasculitis (20 y/a, no meds), s/p LVATS, LUIS wedge resection (2021), recent direct admit for RVATS, RUL Nodule Biopsy w/ IR marking in [facility] sent in from [facility] rehab for 2-3 days of lethargy and weakness with productive cough, found to have anemia, thrombocytopenia and leukocytosis. Hematology consulted for further work up.        # MDS/MPN with increased blasts  - Iron panel consistent AOCD; Ferritin elevated at 5768; B12 elevated, folate>20    - CT C/A/P with contrast without signs of LAD however, Multiple ill-defined opacities are noted within both lungs, more so in the right lower lobe. Exact etiology is unclear. B/L pleural effusions R>L   - peripheral smear reviewed: monocytes noted; blasts seen. LGLs noted  - Bone marrow biopsy 1/23/2023; Pathology consistent with MDS- EB 10-15% blasts .   - Hematology team discussed with her hematologist Dr. Madrigal at [facility]: plan is for single agent HMA for now; once follows up post rehab, can consider add on Dave. Rheumatology still considering rituximab; the initial Plan was to give Rituximab then HMA (azacytidine vs dacogen) once ID clears.   -hematology team communicated with Dr. Madrigal and recommended starting HMA Dacogen (decitabine) x 5 days; planning to start Day 1 on 2/10/23.  -Chemo consent signed by pt and put in the chart. hematology team called pt's son at [place]  [phone] to discuss the situation, and her son understands and agrees with the treatment plan.   - bacteremia found on 1/31 blood culx (Staph epidermitis with methicillin resistance)  s/p meropenem and Vanco; now on nitrofurantion until 2/9   -left side Epistaxis found and ENT f/u, nostril pack removed 2/4 per note ; transfusions for plts <10 if afebrile, <15 if febrile, 50 if bleeding, hbg <7  -Transfuse today plts  - Please monitor CBC with DIFF and TLS labs (CMP, Mg, Phos, LDH, uric acid) daily       # Hx of DLBCL EBV+   - Follows with Dr Madrigal, s/p R-mini-chop in 2021; Recent bone marrow biopsy for new anemia in Nov /2022 did not show any disease recurrence.    - repeat imaging this admission without signs of LAD   - Decitabine 20mg/m2 daily, Day 5/5  - Monitor TLS labs (CMP, Mg, Phos, LDH, uric acid) daily. If UA>5 and less 8 can start allopurinol 300mg daily. If UA>8 can give rasburicase 3mg once +IVF        # ANCA vasculitis:    - Followed by rheum    - On chronic steroids - 20mg prednisone; on bactrim DS 1 tab daily for ppx  -No contraindication to rituximab     Please page with questions or concerns. Will Follow with you.      Stanislav Holland M.D.  Hematology/Oncology Fellow PGY5  Pager [phone]  After 5pm, please contact on-call team.   82 year old woman with a PMHx of diffuse large B cell lymphoma in remission 2021 (pt has a chemoport), depression, HLD, GERD, PE/DVT (4/2021 no longer on Eliquis), ANCA-vasculitis (20 y/a, no meds), s/p LVATS, LUIS wedge resection (2021), recent direct admit for RVATS, RUL Nodule Biopsy w/ IR marking in [facility] sent in from [facility] rehab for 2-3 days of lethargy and weakness with productive cough, found to have anemia, thrombocytopenia and leukocytosis. Hematology consulted for further work up.        # MDS/MPN with increased blasts  - Iron panel consistent AOCD; Ferritin elevated at 5768; B12 elevated, folate>20    - CT C/A/P with contrast without signs of LAD however, Multiple ill-defined opacities are noted within both lungs, more so in the right lower lobe. Exact etiology is unclear. B/L pleural effusions R>L   - peripheral smear reviewed: monocytes noted; blasts seen. LGLs noted  - Bone marrow biopsy 1/23/2023; Pathology consistent with MDS/MPN with EB 10-15%   - bacteremia found on 1/31 blood culx (Staph epidermitis with methicillin resistance)  s/p meropenem and Vanco; now on nitrofurantion until 2/9   -left side Epistaxis found and ENT f/u, nostril pack removed 2/4 per note ; transfusions for plts <10 if afebrile, <15 if febrile, 50 if bleeding, hbg <7  -Transfuse today plts  - Please monitor CBC with DIFF and TLS labs (CMP, Mg, Phos, LDH, uric acid) daily   - s/p 5 days of Decitabine 20mg/m2 daily   -Supportive measures, oral rinses for oral pain    # Hx of DLBCL EBV+   - Follows with Dr Madrigal, s/p R-mini-chop in 2021; Recent bone marrow biopsy for new anemia in Nov /2022 did not show any disease recurrence.    - repeat imaging this admission without signs of LAD      # ANCA vasculitis:    - Followed by rheum    - On chronic steroids - 20mg prednisone; on bactrim DS 1 tab daily for ppx  -No contraindication to rituximab     Please page with questions or concerns. Will Follow with you.      Stanislav Holland M.D.  Hematology/Oncology Fellow PGY5  Pager [phone]  After 5pm, please contact on-call team.

## 2023-02-15 NOTE — PROGRESS NOTE ADULT - SUBJECTIVE AND OBJECTIVE BOX
Hematology/Oncology Follow-up    INTERVAL HPI/OVERNIGHT EVENTS:  Patient S&E at bedside. No o/n events, patient resting comfortably. No complaints at this time. Patient denies fever, chills, dizziness, weakness, CP, palpitations, SOB, cough, N/V/D/C, dysuria, changes in bowel movements, LE edema.    VITAL SIGNS:  T(F): 98.3 (02-15-23 @ 10:15)  HR: 95 (02-15-23 @ 10:15)  BP: 104/61 (02-15-23 @ 10:15)  RR: 18 (02-15-23 @ 10:15)  SpO2: 94% (02-15-23 @ 10:15)  Wt(kg): --    PHYSICAL EXAM:    Constitutional: AAOx3, NAD,   Eyes: PERRL, EOMI, sclera non-icteric  Neck: supple, no masses, no JVD  Respiratory: CTA b/l, good air entry b/l, no wheezing, rhonchi, rales, with normal respiratory effort and no intercostal retractions  Cardiovascular: RRR, normal S1S2, no M/R/G  Gastrointestinal: soft, NTND, no masses palpable, BS normal in all four quadrants, no HSM  Extremities:  no c/c/e  Neurological: Grossly intact  Skin: No rash or lesion    MEDICATIONS  (STANDING):  Biotene Dry Mouth Oral Rinse 15 milliLiter(s) Swish and Spit four times a day  budesonide 160 MICROgram(s)/formoterol 4.5 MICROgram(s) Inhaler 2 Puff(s) Inhalation two times a day  chlorhexidine 2% Cloths 1 Application(s) Topical daily  cholecalciferol 2000 Unit(s) Oral daily  citalopram 10 milliGRAM(s) Oral daily  FIRST- Mouthwash  BLM 10 milliLiter(s) Swish and Spit four times a day  fluticasone propionate 50 MICROgram(s)/spray Nasal Spray 1 Spray(s) Both Nostrils two times a day  folic acid 1 milliGRAM(s) Oral daily  guaiFENesin  milliGRAM(s) Oral every 12 hours  influenza  Vaccine (HIGH DOSE) 0.7 milliLiter(s) IntraMuscular once  ipratropium    for Nebulization 500 MICROGram(s) Nebulizer every 6 hours  lactobacillus acidophilus 1 Tablet(s) Oral daily  metoprolol tartrate 25 milliGRAM(s) Oral two times a day  mirtazapine 7.5 milliGRAM(s) Oral daily  pantoprazole    Tablet 40 milliGRAM(s) Oral before breakfast  potassium chloride    Tablet ER 40 milliEquivalent(s) Oral every 4 hours  potassium chloride  10 mEq/100 mL IVPB 10 milliEquivalent(s) IV Intermittent every 1 hour  predniSONE   Tablet 20 milliGRAM(s) Oral daily  senna 1 Tablet(s) Oral daily  sodium bicarbonate 650 milliGRAM(s) Oral three times a day  sodium chloride 0.65% Nasal 2 Spray(s) Both Nostrils every 6 hours  trimethoprim  160 mG/sulfamethoxazole 800 mG 1 Tablet(s) Oral daily    MEDICATIONS  (PRN):  acetaminophen     Tablet .. 650 milliGRAM(s) Oral every 6 hours PRN Temp greater or equal to 38C (100.4F), Mild Pain (1 - 3)  aluminum hydroxide/magnesium hydroxide/simethicone Suspension 30 milliLiter(s) Oral every 4 hours PRN Dyspepsia  benzocaine/menthol Lozenge 1 Lozenge Oral every 8 hours PRN Sore Throat  melatonin 3 milliGRAM(s) Oral at bedtime PRN Insomnia  ondansetron Injectable 4 milliGRAM(s) IV Push every 8 hours PRN Nausea and/or Vomiting  polyethylene glycol 3350 17 Gram(s) Oral daily PRN Constipation      codeine (Nausea)  doxycycline (Nausea)  penicillin (Hives)      LABS:                        8.2    42.40 )-----------( 4        ( 15 Feb 2023 07:42 )             24.8     02-15    137  |  102  |  30<H>  ----------------------------<  149<H>  2.9<LL>   |  21<L>  |  0.65    Ca    8.5      15 Feb 2023 07:42  Phos  3.0     02-15  Mg     1.8     02-15    TPro  6.1  /  Alb  3.4  /  TBili  1.0  /  DBili  x   /  AST  18  /  ALT  6<L>  /  AlkPhos  104  02-15     Lactate Dehydrogenase, Serum: 785 U/L (02-15 @ 07:42)        RADIOLOGY & ADDITIONAL TESTS:  Studies reviewed.   Hematology/Oncology Follow-up    INTERVAL HPI/OVERNIGHT EVENTS:  Patient S&E at bedside. No o/n events, patient resting comfortably.  Patient denies fever, chills, dizziness, weakness, CP, palpitations, SOB, cough, N/V/D/C, dysuria, changes in bowel movements, LE edema. She endorses oral pain.    VITAL SIGNS:  T(F): 98.3 (02-15-23 @ 10:15)  HR: 95 (02-15-23 @ 10:15)  BP: 104/61 (02-15-23 @ 10:15)  RR: 18 (02-15-23 @ 10:15)  SpO2: 94% (02-15-23 @ 10:15)  Wt(kg): --    PHYSICAL EXAM:    Constitutional: AAOx3, NAD,   Eyes: PERRL, EOMI, sclera non-icteric  Neck: supple, no masses, no JVD  Respiratory: CTA b/l, good air entry b/l, no wheezing, rhonchi, rales, with normal respiratory effort and no intercostal retractions  Cardiovascular: RRR, normal S1S2, no M/R/G  Gastrointestinal: soft, NTND, no masses palpable, BS normal in all four quadrants, no HSM  Extremities:  no c/c/e  Neurological: Grossly intact  Skin: ecchymosis. crust surround mouth.    MEDICATIONS  (STANDING):  Biotene Dry Mouth Oral Rinse 15 milliLiter(s) Swish and Spit four times a day  budesonide 160 MICROgram(s)/formoterol 4.5 MICROgram(s) Inhaler 2 Puff(s) Inhalation two times a day  chlorhexidine 2% Cloths 1 Application(s) Topical daily  cholecalciferol 2000 Unit(s) Oral daily  citalopram 10 milliGRAM(s) Oral daily  FIRST- Mouthwash  BLM 10 milliLiter(s) Swish and Spit four times a day  fluticasone propionate 50 MICROgram(s)/spray Nasal Spray 1 Spray(s) Both Nostrils two times a day  folic acid 1 milliGRAM(s) Oral daily  guaiFENesin  milliGRAM(s) Oral every 12 hours  influenza  Vaccine (HIGH DOSE) 0.7 milliLiter(s) IntraMuscular once  ipratropium    for Nebulization 500 MICROGram(s) Nebulizer every 6 hours  lactobacillus acidophilus 1 Tablet(s) Oral daily  metoprolol tartrate 25 milliGRAM(s) Oral two times a day  mirtazapine 7.5 milliGRAM(s) Oral daily  pantoprazole    Tablet 40 milliGRAM(s) Oral before breakfast  potassium chloride    Tablet ER 40 milliEquivalent(s) Oral every 4 hours  potassium chloride  10 mEq/100 mL IVPB 10 milliEquivalent(s) IV Intermittent every 1 hour  predniSONE   Tablet 20 milliGRAM(s) Oral daily  senna 1 Tablet(s) Oral daily  sodium bicarbonate 650 milliGRAM(s) Oral three times a day  sodium chloride 0.65% Nasal 2 Spray(s) Both Nostrils every 6 hours  trimethoprim  160 mG/sulfamethoxazole 800 mG 1 Tablet(s) Oral daily    MEDICATIONS  (PRN):  acetaminophen     Tablet .. 650 milliGRAM(s) Oral every 6 hours PRN Temp greater or equal to 38C (100.4F), Mild Pain (1 - 3)  aluminum hydroxide/magnesium hydroxide/simethicone Suspension 30 milliLiter(s) Oral every 4 hours PRN Dyspepsia  benzocaine/menthol Lozenge 1 Lozenge Oral every 8 hours PRN Sore Throat  melatonin 3 milliGRAM(s) Oral at bedtime PRN Insomnia  ondansetron Injectable 4 milliGRAM(s) IV Push every 8 hours PRN Nausea and/or Vomiting  polyethylene glycol 3350 17 Gram(s) Oral daily PRN Constipation      codeine (Nausea)  doxycycline (Nausea)  penicillin (Hives)      LABS:                        8.2    42.40 )-----------( 4        ( 15 Feb 2023 07:42 )             24.8     02-15    137  |  102  |  30<H>  ----------------------------<  149<H>  2.9<LL>   |  21<L>  |  0.65    Ca    8.5      15 Feb 2023 07:42  Phos  3.0     02-15  Mg     1.8     02-15    TPro  6.1  /  Alb  3.4  /  TBili  1.0  /  DBili  x   /  AST  18  /  ALT  6<L>  /  AlkPhos  104  02-15     Lactate Dehydrogenase, Serum: 785 U/L (02-15 @ 07:42)        RADIOLOGY & ADDITIONAL TESTS:  Studies reviewed.

## 2023-02-15 NOTE — CHART NOTE - NSCHARTNOTEFT_GEN_A_CORE
Nutrition Follow Up Note  Patient seen for: nutrition follow-up    Chart reviewed, events noted. 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 Hematology consulted for further work up." Per chart pt started on  HMA Dacogen (decitabine) x 5 days on 2/10, today is day 5.     Source: [x] Patient       [x] EMR        [] RN        [x] Family at bedside       [] Other:    -If unable to interview patient: [] Trach/Vent/BiPAP  [] Disoriented/confused/inappropriate to interview    Diet Order:   Diet, Regular:   1200mL Fluid Restriction ([id number])  Supplement Feeding Modality:  Oral  Ensure Clear Cans or Servings Per Day:  2       Frequency:  Daily (02-02-23)    - Is current order appropriate/adequate? [x] Yes  []  No:     - PO intake :   [] >75%  Adequate    [] 50-75%  Fair       [x] <50%  Poor    - Nutrition-related concerns:  -pt reports decreased appetite in-house but has been trying her best to eat. per nursing flow sheets pt consuming 0-50% of meals  -Has been drinking Ensure Clear shakes, likes the berry flavor. Plans to try to consume 2 daily   -Obtained food preferences, will honor as able. Assisted with meal selection for lunch today.   -encourage intake of protein-rich, nutrient dense foods.     GI:  Last BM _2/10, noted with fecal incontinence _.   Bowel Regimen? [x] Yes   [] No      Weights: no new weight to assess, recommend obtain new standing or zeroed bescale weight   52.2Kg (1/19)      Nutritionally Pertinent MEDICATIONS  (STANDING):  cholecalciferol  folic acid  metoprolol tartrate  pantoprazole    Tablet  potassium chloride   Solution  predniSONE   Tablet  senna  sodium bicarbonate  trimethoprim  160 mG/sulfamethoxazole 800 mG    Pertinent Labs: 02-15 @ 07:42: Na 137, BUN 30<H>, Cr 0.65, <H>, K+ 2.9<LL>, Phos 3.0, Mg 1.8, Alk Phos 104, ALT/SGPT 6<L>, AST/SGOT 18, HbA1c --      Skin per nursing documentation: free of pressure injuries per nursing flow sheets   Edema: none     Estimated Needs:   [x] no change since previous assessment  [] recalculated:     Previous Nutrition Diagnosis: Severe malnutrition   Nutrition Diagnosis is: [x] ongoing  [] resolved [] not applicable      New Nutrition Diagnosis: [x] Not applicable    Nutrition Care Plan:  [x] In Progress  [] Achieved  [] Not applicable    Nutrition Interventions:     Education Provided:       [x] Yes:  [] No: Discussed with patient importance of PO intake and prioritizing sources of protein to maintain lean body mass.  Pt reports understanding with no nutrition related questions at this time. Pt made aware RD remains available as needed.        Recommendations:         [x] Continue current diet order as tolerated.      [x] Family encouraged to continue to bring in food from home as well to optimize preferences     [x] Continue Ensure Clear mixed berry 2x daily      [x] RD to remain available and follow-up as medically appropriate.       Monitoring and Evaluation:   Continue to monitor nutritional intake, tolerance to diet prescription, weights, labs, skin integrity      RD remains available upon request and will follow up per protocol

## 2023-02-16 NOTE — PROGRESS NOTE ADULT - SUBJECTIVE AND OBJECTIVE BOX
[facility]  ANDREW Rodríguez Y. Patel, S. Shah, G. Casimir  [phone]  ([phone] - weekdays after 5pm and weekends)    Name: BETTIE NGUYEN  Age/Gender: 82y Female  MRN: [id number]    Interval History:  Patient seen and examined this morning.   States she feels ok now, denies pain or any new complaints.  Noted had bleeding around port, dressing changed and pressure applied.   Notes reviewed. Afebrile     Allergies: codeine (Nausea)  doxycycline (Nausea)  penicillin (Hives)    Objective:  Vitals:   T(F): 98.7 (02-16-23 @ 07:51), Max: 98.7 (02-16-23 @ 07:51)  HR: 110 (02-16-23 @ 08:34) (103 - 136)  BP: 110/57 (02-16-23 @ 07:51) (110/57 - 127/73)  RR: 18 (02-16-23 @ 08:34) (17 - 22)  SpO2: 94% (02-16-23 @ 08:34) (92% - 98%)  Physical Examination:  General: no acute distress, NC  HEENT: NC/AT, anicteric, dried bloody lesions on lips, neck supple  Respiratory: decreased breath sounds b/l  Cardiovascular: S1 and S2 present, normal rate  Gastrointestinal: soft, nontender, nondistended  Extremities: no edema, no cyanosis  Skin: warm, dry, no visible rash, ecchymosis   Lines: L chest port accessed with no TTP/erythema    Laboratory Studies:  CBC:                       7.4    31.79 )-----------( 52       ( 16 Feb 2023 08:10 )             21.7     WBC Trend:  31.79 02-16-23 @ 08:10  33.45 02-16-23 @ 04:14  42.40 02-15-23 @ 07:42  53.70 02-14-23 @ 06:38  61.18 02-13-23 @ 16:41  66.95 02-13-23 @ 09:22  70.94 02-12-23 @ 17:26  62.29 02-12-23 @ 07:05  60.71 02-11-23 @ 07:05  54.66 02-10-23 @ 06:50    CMP: 02-16    138  |  104  |  26<H>  ----------------------------<  153<H>  3.3<L>   |  16<L>  |  0.53    Ca    8.3<L>      16 Feb 2023 04:09  Phos  1.9     02-16  Mg     1.6     02-16    TPro  5.6<L>  /  Alb  3.1<L>  /  TBili  1.1  /  DBili  x   /  AST  20  /  ALT  5<L>  /  AlkPhos  97  02-16    Creatinine, Serum: 0.53 mg/dL (02-16-23 @ 04:09)  Creatinine, Serum: 0.65 mg/dL (02-15-23 @ 07:42)  Creatinine, Serum: 0.80 mg/dL (02-14-23 @ 06:38)  Creatinine, Serum: 1.04 mg/dL (02-13-23 @ 09:22)  Creatinine, Serum: 0.83 mg/dL (02-12-23 @ 07:04)  Creatinine, Serum: 0.81 mg/dL (02-11-23 @ 07:05)  Creatinine, Serum: 0.73 mg/dL (02-10-23 @ 06:52)    LIVER FUNCTIONS - ( 16 Feb 2023 04:09 )  Alb: 3.1 g/dL / Pro: 5.6 g/dL / ALK PHOS: 97 U/L / ALT: 5 U/L / AST: 20 U/L / GGT: x           Microbiology: reviewed   Culture - Blood (collected 02-12-23 @ 17:54)  Source: .Blood Blood-Peripheral  Preliminary Report (02-13-23 @ 22:02):    No growth to date.    Culture - Blood (collected 02-12-23 @ 17:26)  Source: .Blood Blood-Peripheral  Preliminary Report (02-13-23 @ 22:02):    No growth to date.    Radiology: reviewed     Medications:  acetaminophen     Tablet .. 650 milliGRAM(s) Oral every 6 hours PRN  aluminum hydroxide/magnesium hydroxide/simethicone Suspension 30 milliLiter(s) Oral every 4 hours PRN  benzocaine/menthol Lozenge 1 Lozenge Oral every 8 hours PRN  Biotene Dry Mouth Oral Rinse 15 milliLiter(s) Swish and Spit four times a day  budesonide 160 MICROgram(s)/formoterol 4.5 MICROgram(s) Inhaler 2 Puff(s) Inhalation two times a day  chlorhexidine 2% Cloths 1 Application(s) Topical daily  cholecalciferol 2000 Unit(s) Oral daily  citalopram 10 milliGRAM(s) Oral daily  FIRST- Mouthwash  BLM 10 milliLiter(s) Swish and Spit four times a day  fluticasone propionate 50 MICROgram(s)/spray Nasal Spray 1 Spray(s) Both Nostrils two times a day  folic acid 1 milliGRAM(s) Oral daily  guaiFENesin  milliGRAM(s) Oral every 12 hours  influenza  Vaccine (HIGH DOSE) 0.7 milliLiter(s) IntraMuscular once  ipratropium    for Nebulization 500 MICROGram(s) Nebulizer every 6 hours  lactobacillus acidophilus 1 Tablet(s) Oral daily  melatonin 3 milliGRAM(s) Oral at bedtime PRN  metoprolol tartrate 25 milliGRAM(s) Oral two times a day  mirtazapine 7.5 milliGRAM(s) Oral daily  ondansetron Injectable 4 milliGRAM(s) IV Push every 8 hours PRN  pantoprazole    Tablet 40 milliGRAM(s) Oral before breakfast  polyethylene glycol 3350 17 Gram(s) Oral daily PRN  potassium chloride    Tablet ER 40 milliEquivalent(s) Oral once  predniSONE   Tablet 20 milliGRAM(s) Oral daily  senna 1 Tablet(s) Oral daily  sodium bicarbonate 650 milliGRAM(s) Oral three times a day  sodium chloride 0.65% Nasal 2 Spray(s) Both Nostrils every 6 hours  trimethoprim  160 mG/sulfamethoxazole 800 mG 1 Tablet(s) Oral daily    Current Antimicrobials:  trimethoprim  160 mG/sulfamethoxazole 800 mG 1 Tablet(s) Oral daily    Prior/Completed Antimicrobials:  cefepime   IVPB  nitrofurantoin monohydrate/macrocrystals (MACROBID)  vancomycin  IVPB

## 2023-02-16 NOTE — PROGRESS NOTE ADULT - ASSESSMENT
Please notify the patient of normal results, X ray of ankle and  X ray of the knee are normal, she has mild ostearthritis in her knee, no fractures, continue current management.  Follow-up as planned. 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 She presented here from [facility] for elevated WBC and low hemoglobin, severe weakness. Pt states for the past 2-3 days has been having increased weakness and lethargy, decreased appetite. +sputum productive cough. + cui, no sob, no difficulty breathing. No abdominal pain, nausea, vomiting, no bloody stools. Has endorsed multiple episodes of loose stools x weeks, currently being treated for c.diff with oral vancomycin.       Fatigue/dyspnea: likely due to severe anemia :  pt s/p 1 unit PRB  Diarrhea  Pulmonary vasculitis  :  hx of Tachycardia :  Recent TTE on 11/3/22 reviewed: normal LV. outpt card Dr. Ady Cooper  Anxiety and depression  GERD (gastroesophageal reflux disease)  Hyperlipidemia.   DVT ppx     1/20:  Fatigue/dyspnea: likely due to severe anemia :  pt s/p 1 unit PRBC: hb now  > 10  : she is on 2 L of oxygen : no wheezing: no overt signs of bleeding : ct chest is abnormal report noted:  has jean-claude ill defined opacities of unclear etiology  : venous ph is mild acidotic:  no need for Bipap at this time : monitor and trend hb  Diarrhea : symptomatic rx:  workup per primary team  Pulmonary vasculitis  : per rheumatology  : had recent vats surgery : LN biopsy noted:   hx of Tachycardia :  Recent TTE on 11/3/22 reviewed: normal LV. outpt card Dr. Ady Cooper  Anxiety and depression  GERD (gastroesophageal reflux disease) : ppi   Hyperlipidemia.   recent covid  : o n last admission she was treated for covid infection:   DVT ppx   d wteam    1/22:    Fatigue/dyspnea: likely due to severe anemia :  pt s/p 1 unit PRBC: hb now  > 10  : she is on 2 L of oxygen : no wheezing: no overt signs of bleeding : ct chest is abnormal report noted:  has jean-claude ill defined opacities of unclear etiology  : venous ph is mild acidotic:  no need for Bipap at this time : monitor and trend hb: she remained stable o gracie last 1 days:  she is not on any antibtiocs at this time: RVP  and blood cultures are negative: she had ebus biopsy also before: reviewed: ID following  Diarrhea : symptomatic rx:  workup per primary team : seems to have resolved  Pulmonary vasculitis  : per rheumatology  : had recent vats surgery : LN biopsy noted:   Bicytopneia and leucocytosis: ? etiology  : for possible bone marrow biopsy on Monday    hx of Tachycardia :  Recent TTE on 11/3/22 reviewed: normal LV. outpt card Dr. dAy Cooper  Anxiety and depression  GERD (gastroesophageal reflux disease) : ppi   Hyperlipidemia.   recent covid  : on last admission she was treated for covid infection:   DVT ppx   dw team    1/23:    Fatigue/dyspnea: likely due to severe anemia : feels weak  but no bleeding noted:  on 2 L of oxygen : she needs PT OT   Diarrhea :resolved:   Pulmonary vasculitis  : per rheumatology  : had recent vats surgery : LN biopsy noted: : she never received teat ment for vasculitis except low dose steroids:  she is awaiting bone marrow biopsy prior to induction therapy with rituximab: The ct chest done on this admission does not show pneumothorax and has jean-claude effusions:  There are some new opacites in rigth lower lobe which were not therre:  clinically she does not seem to have pneumonia: ID following: could it be a prt of vasculitis  Bicytopneia and leucocytosis: ? etiology  : for possible bone marrow biopsy today  hx of Tachycardia :  Recent TTE on 11/3/22 reviewed: normal LV. outpt card Dr. Ady Cooper  Anxiety and depression  GERD (gastroesophageal reflux disease) : ppi   Hyperlipidemia.   recent covid  : on last admission she was treated for covid infection:   DVT ppx   dw team    1/24:    Fatigue/dyspnea: likely due to severe anemia : feels weak  but no bleeding noted:  on 2 L of oxygen : she needs PT OT   Diarrhea :resolved:   Pulmonary vasculitis  : per rheumatology  : had recent vats surgery : LN biopsy noted: : she never received teat ment for vasculitis except low dose steroids:  she is awaiting bone marrow biopsy prior to induction therapy with rituximab: The ct chest done on this admission does not show pneumothorax and has jean-claude effusions:  There are some new opacites in rigth lower lobe which were not therre:  clinically she does not seem to have pneumonia: ID following: could it be a prt of vasculitis  Bicytopneia and leucocytosis: BM biopsy done: await results for eventual immunosuppressives therapy:   hx of Tachycardia :  Recent TTE on 11/3/22 reviewed: normal LV. outpt card Dr. Ady Cooper  Anxiety and depression  GERD (gastroesophageal reflux disease) : ppi   Hyperlipidemia.   recent covid  : on last admission she was treated for covid infection:   DVT ppx   dw team    1/25:    Fatigue/dyspnea: likely due to severe anemia : feels weak  but no bleeding noted:  on 2 L of oxygen : she needs PT OT   Diarrhea :resolved:   Pulmonary vasculitis  : per rheumatology  : had recent vats surgery : LN biopsy noted: : she never received teat ment for vasculitis except low dose steroids:  she is awaiting bone marrow biopsy prior to induction therapy with rituximab: The ct chest done on this admission does not show pneumothorax and has jean-claude effusions:  There are some new opacites in rigth lower lobe which were not there:  clinically she does not seem to have pneumonia: ID following: could it be a part of vasculitis : her resp status has not changed   Bicytopneia and leucocytosis: BM biopsy done: await results for still pending   hx of Tachycardia :  Recent TTE on 11/3/22 reviewed: normal LV. outpt card Dr. Ady Cooper  Anxiety and depression  GERD (gastroesophageal reflux disease) : ppi   Hyperlipidemia.   recent covid  : on last admission she was treated for covid infection:   DVT ppx   dw team: awaiting decision on immunosuppression     1/26;      Fatigue/dyspnea: likely due to severe anemia : feels weak  but no bleeding noted:  on 2 L of oxygen : she needs PT OT : got it yesterday    Diarrhea :resolved:   Pulmonary vasculitis  : per rheumatology  : had recent vats surgery : LN biopsy noted: : she never received teat ment for vasculitis except low dose steroids:  she is awaiting bone marrow biopsy prior to induction therapy with rituximab: The ct chest done on this admission does not show pneumothorax and has jean-claude effusions:  There are some new opacities in rigth lower lobe which were not there:  clinically she does not seem to have pneumonia: ID following: could it be a part of vasculitis : her resp status has not changed  : being observed off antibiotics   Bicytopneia and leucocytosis: BM biopsy done: await results for still pending   hx of Tachycardia :  Recent TTE on 11/3/22 reviewed: normal LV. outpt card Dr. Ady Cooper  Anxiety and depression  GERD (gastroesophageal reflux disease) : ppi   Hyperlipidemia.   recent covid  : on last admission she was treated for covid infection:   DVT ppx   dw team: awaiting decision on immunosuppression     1/27:    Fatigue/dyspnea: likely due to severe anemia : feels weak  but no bleeding noted:  on 2 L of oxygen : cont  PT OT :   Diarrhea :resolved:   Pulmonary vasculitis  : per rheumatology  : had recent vats surgery : LN biopsy noted: : she never received teat ment for vasculitis except low dose steroids:  she is awaiting bone marrow biopsy prior to induction therapy with rituximab: The ct chest done on this admission does not show pneumothorax and has jean-claude effusions:  There are some new opacities in rigth lower lobe which were not there:  clinically she does not seem to have pneumonia: ID following: could it be a part of vasculitis : her resp status has not changed  : being observed off antibiotics :  on bactrim prophylaxis as she is on chr steroids   Bicytopneia and leucocytosis: BM biopsy done: await results for still pending   hx of Tachycardia :  Recent TTE on 11/3/22 reviewed: normal LV. outpt card Dr. Ady Cooper  Anxiety and depression  GERD (gastroesophageal reflux disease) : ppi   Hyperlipidemia.   recent covid  : on last admission she was treated for covid infection:   DVT ppx   dw team: awaiting decision on immunosuppression     1/29:    Fatigue/dyspnea: likely due to severe anemia : feels weak  but no bleeding noted:  on 2 L of oxygen : cont  PT OT :   Diarrhea :resolved:   Pulmonary vasculitis  : per rheumatology  : had recent vats surgery : LN biopsy noted: : she never received treat ment for vasculitis except low dose steroids:  she is awaiting bone marrow biopsy prior to induction therapy with rituximab: The ct chest done on this admission does not show pneumothorax and has jean-claude effusions:  There are some new opacities in rigth lower lobe which were not there:  clinically she does not seem to have pneumonia: ID following: could it be a part of vasculitis : her resp status has not changed  : being observed off antibiotics :  on bactrim prophylaxis as she is on chr steroids   Bicytopneia and leucocytosis: BM biopsy: results reviewed defer to [facility]  hx of Tachycardia :  Recent TTE on 11/3/22 reviewed: normal LV.   Anxiety and depression  GERD (gastroesophageal reflux disease) : ppi   Hyperlipidemia.   recent covid  : on last admission she was treated for covid infection:   DVT ppx   dw team: awaiting decision on immunosuppression     1/30:    Fatigue/dyspnea: likely due to severe anemia : feels weak  but no bleeding noted:  on 2 L of oxygen : cont  PT OT : sitting in chair today   Diarrhea :resolved:   Pulmonary vasculitis  : per rheumatology  : had recent vats surgery : LN biopsy noted: : she never received treat ment for vasculitis except low dose steroids:  she is awaiting bone marrow biopsy prior to induction therapy with rituximab: The ct chest done on this admission does not show pneumothorax and has jean-claude effusions:  There are some new opacities in rigth lower lobe which were not there:  clinically she does not seem to have pneumonia: ID following: could it be a part of vasculitis : her resp status has not changed  : being observed off antibiotics :  on bactrim prophylaxis as she is on chr steroids  /l however she had low grade tempt today : rvp is negative : cultures sent: ID follow up   Bicytopneia and leucocytosis: BM biopsy: results reviewed defer to [facility]  hx of Tachycardia :  Recent TTE on 11/3/22 reviewed: normal LV.   Anxiety and depression  GERD (gastroesophageal reflux disease) : ppi   Hyperlipidemia.   recent covid  : on last admission she was treated for covid infection:   DVT ppx   dw team: awaiting decision on immunosuppression     1/31:    Fatigue/dyspnea: likely due to severe anemia : feels weak  but no bleeding noted:  on 2 L of oxygen : cont  PT OT : lying in bed:   Diarrhea :resolved:   Pulmonary vasculitis  : per rheumatology  : had recent vats surgery : LN biopsy noted: : she never received treat ment for vasculitis except low dose steroids:  she is awaiting bone marrow biopsy prior to induction therapy with rituximab: The ct chest done on this admission does not show pneumothorax and has jean-claude effusions:  There are some new opacities in rigth lower lobe which were not there:  clinically she does not seem to have pneumonia: ID following: could it be a part of vasculitis : her resp status has not changed  : being observed off antibiotics :  on bactrim prophylaxis as she is on chr steroids : she seems OK:  no sob:     Bicytopneia and leucocytosis: BM biopsy: results reviewed defer to [facility] ; for eventual chemo   hx of Tachycardia :  Recent TTE on 11/3/22 reviewed: normal LV.   Anxiety and depression  GERD (gastroesophageal reflux disease) : ppi   Hyperlipidemia.   recent covid  : on last admission she was treated for covid infection:   DVT ppx   dw team: awaiting decision on immunosuppression     2/1:    Fatigue/dyspnea: likely due to severe anemia : feels weak  but no bleeding noted:  on 2 L of oxygen : cont  PT OT : lying in bed:  her blood cultures are contaminant:   Diarrhea :resolved:   Pulmonary vasculitis  : per rheumatology  : had recent vats surgery : LN biopsy noted: : she never received treat ment for vasculitis except low dose steroids:  she is awaiting bone marrow biopsy prior to induction therapy with rituximab: The ct chest done on this admission does not show pneumothorax and has jean-claude effusions:  There are some new opacities in rigth lower lobe which were not there:  clinically she does not seem to have pneumonia: ID following: could it be a part of vasculitis : her resp status has not changed  : being observed off antibiotics :  on bactrim prophylaxis as she is on chr steroids : she seems OK:  no sob:     Bicytopneia and leucocytosis: BM biopsy: results reviewed defer to [facility] ; for eventual chemo   hx of Tachycardia :  Recent TTE on 11/3/22 reviewed: normal LV.   Anxiety and depression  GERD (gastroesophageal reflux disease) : ppi   Hyperlipidemia.   recent covid  : on last admission she was treated for covid infection:   DVT ppx   dw team: awaiting decision on immunosuppression: overall her general condition seems very poor and very weak:     2/2:    Fatigue/dyspnea: likely due to severe anemia : feels weak  but no bleeding noted:  on 2 L of oxygen : cont  PT OT : lying in bed:  her blood cultures are positive E FAECALIS :  ON MEROPENEM  Diarrhea :resolved:   Pulmonary vasculitis  : per rheumatology  : had recent vats surgery : LN biopsy noted: : she never received treat ment for vasculitis except low dose steroids:  she is awaiting bone marrow biopsy prior to induction therapy with rituximab: The ct chest done on this admission does not show pneumothorax and has jean-claude effusions:  There are some new opacities in rigth lower lobe which were not there:  clinically she does not seem to have pneumonia: ID following: could it be a part of vasculitis : her resp status has not changed  : being observed off antibiotics :  on bactrim prophylaxis as she is on chr steroids : she seems OK:  no sob:     Bicytopneia and leucocytosis: BM biopsy: results reviewed defer to [facility] ; for eventual chemo   hx of Tachycardia :  Recent TTE on 11/3/22 reviewed: normal LV.   Anxiety and depression  GERD (gastroesophageal reflux disease) : ppi   Hyperlipidemia.   recent covid  : on last admission she was treated for covid infection:   DVT ppx   dw team: awaiting decision on immunosuppression: overall her general condition seems very poor and very weak: ON ANTIBIOTICS     2/3   Pulmonary Vasculitis  -Steroids per rheum  -Plan for eventual Rituxan  -Bactrim for PCP ppx  MDS  -Per heme/onc  Bacteremia  -E. Faecalis bacteremia  -ABX per ID  Epistaxis  -Per ENT  -Breathing comfortably on 40% humidified face tent, keep sats >90%    2/6:    2/6  Pulmonary Vasculitis  -Steroids per rheum  -Plan for eventual Rituxan /l she is on room air at this time  -Bactrim for PCP ppx  MDS  -Per heme/onc  Bacteremia  -E. Faecalis bacteremia  -ABX per ID  Epistaxis  -Per ENT  - she is on room air today  : cont to mantain o2 sao2 above 90% all the ti me:     acp      2/7:  Pulmonary Vasculitis  -Steroids per rheum  -Plan for eventual Rituxan /l she is on room air at this time  -Bactrim for PCP ppx  MDS  -Per heme/onc  Bacteremia  -E. Faecalis bacteremia  -ABX per ID : awaiting clearance from id for chemo : last blood cultures have been negative  Epistaxis  -Per ENT  - she is on room air today  : cont to mantain o2 sao2 above 90% all the ti me:     acp    2/8:  Pulmonary Vasculitis  -Steroids per rheum  -Plan for eventual Rituxan /l she is on room air at this time  -Bactrim for PCP ppx  MDS  -Per heme/onc  Bacteremia  -E. Faecalis bacteremia  -ABX per ID : awaiting clearance from id for chemo : last blood cultures have been negative : finishing antbiotics today  :  Epistaxis  -Per ENT  - she is on room air today  : cont to mantain o2 sao2 above 90% all the ti me:  Thrombocytopenia:  sign today  : 14k: no obvious bleeding: cont to monitorial : transfuse per [facility]     acp      2/9:    Pulmonary Vasculitis : Steroids per rheum : Plan for eventual Rituxan /l she is on room air at this time : Bactrim for PCP ppx : on room air: with no change in her resp status  MDS : s'p BM biopsy : has MDS : defer to [facility]mon[facility]  Bacteremia E. Faecalis bacteremia  -ABX per ID : awaiting clearance from id for chemo : last blood cultures have been negative : finished antibiotics   Epistaxis Per ENT : she is on room air today  : cont to mantain o2 sao2 above 90% all the ti me:  Thrombocytopenia:  sign today  : 10k: no obvious bleeding: cont to monitorial : transfuse per [facility] :? for plt transfusion  today      dw acp    2/10:  Pulmonary Vasculitis : Steroids per rheum : Plan for eventual Rituxan /l she is on room air at this time : Bactrim for PCP ppx : on room air: with no change in her resp status : now plan for rituximab aftger 5 days of dacogen   MDS : s'p BM biopsy : has MDS : defer to [facility]mon[facility]  Bacteremia E. Faecalis bacteremia  -ABX per ID : awaiting clearance from id for chemo : last blood cultures have been negative : finished antibiotics   Epistaxis Per ENT : she is on room air today  : cont to mantain o2 sao2 above 90% all the ti me:  Thrombocytopenia:  still low, but much better,  no obvious bleeding: cont to monitorial :  dw acp    2/12:  Pulmonary Vasculitis : Steroids per rheum : Plan for eventual Rituxan /l she is on room air at this time : Bactrim for PCP ppx : on room air: with no change in her resp status : now plan for rituximab after 5 days of dacogen : today is day 3 : doing  ok : no resp idstress  MDS : s'p BM biopsy : has MDS : defer to hemon[facility]  Bacteremia E. Faecalis bacteremia  -ABX per ID : awaiting clearance from id for chemo : last blood cultures have been negative : finished antibiotics   Epistaxis Per ENT : she is on room air today  : cont to mantain o2 sao2 above 90% all the ti me:  Thrombocytopenia:  still low, but much better,  no obvious bleeding: cont to monitor :  dw acp      2/13:  Low grade temperature : pancultured:  chest xray with improvement in infiltrates : id following : no antibiotics yet:   Pulmonary Vasculitis : Steroids per rheum : Plan for eventual Rituxan.  she is on room air at this time : Bactrim for PCP ppx : on 2 L of oxygen today : rpt chest xray noted:   MDS : s'p BM biopsy : has MDS : defer to [facility]monc  Bacteremia E. Faecalis bacteremia  -ABX per ID : awaiting clearance from id for chemo : last blood cultures have been negative : finished antibiotics   Epistaxis Per ENT : resolved  Thrombocytopenia:  for transfusion today    dw acp : pt looks pretty weak and cachectic:      2/14;  Low grade temperature :resolved  pancultured:  chest xray with improvement in infiltrates : id following : no antibiotics yet:   Pulmonary Vasculitis : Steroids per rheum : Plan for eventual Rituxan.  she is on room air at this time : Bactrim for PCP ppx : on 2 L of oxygen today : rpt chest xray noted:   MDS : s'p BM biopsy : has MDS : defer to [facility]  Bacteremia E. Faecalis bacteremia: resolved:   Epistaxis Per ENT : resolved  Thrombocytopenia:  for transfusion today    dw acp : pt looks pretty weak and cachectic:        2/15:    Low grade temperature :resolved  pancultured:  chest xray with improvement in infiltrates : id following : no antibiotics yet: still   Pulmonary Vasculitis : Steroids per rheum : Plan for eventual Rituxan.  she is on room air at this time : Bactrim for PCP ppx : on 2 L of oxygen today : rpt chest xray noted: for rituximab per rheum:   MDS :with increased blasts:  s'p BM biopsy : has MDS : defer to [facility]monc : gettng chemo   Bacteremia E. Faecalis bacteremia: resolved:   Epistaxis Per ENT : resolved  Thrombocytopenia:  for transfusion today    dw acp : pt looks pretty weak and cachectic:  :  cont current suppotive care: p lats transfusion    2/16:    Hypoxic resp failure:  her o2 requirement has increased today  : chest xray ordered: and ABG orderd:  she also seems confused today :   Low grade temperature :resolved  pancultured:  chest xray with improvement in infiltrates : id following : no antibiotics yet: still   Pulmonary Vasculitis : Steroids per rheum : Plan for eventual Rituxan.  she is on room air at this time : Bactrim for PCP ppx : on 4 L of oxygen today : rpt chest xray orderd: : for rituximab per rheum:   MDS :with increased blasts:  s'p BM biopsy : has MDS : defer to [facility]monc : gettng chemo   Bacteremia E. Faecalis bacteremia: resolved:   Epistaxis Per ENT : resolved  Thrombocytopenia:  for transfusion today     pt looks pretty weak and cachectic:  :  cont current supportive care: p lats transfusion:  today she isnot looking good: more hypoxic: await abg"  dw acp

## 2023-02-16 NOTE — CHART NOTE - NSCHARTNOTEFT_GEN_A_CORE
MEDICINE PA Note    BETTIE NGUYEN    Notified by RN, patient has pooling of blood under chest wall port dressing. Pt seen at bedside, dressing changed and pressure applied, CBC STAT sent. Platelets x1 ordered. Will continue to monitor patient. Will endorse to AM team.                           7.2    33.45 )-----------( 5        ( 16 Feb 2023 04:14 )             21.7     Medicine PA Note  Department of Medicine  Spectra #[id number]

## 2023-02-16 NOTE — PROGRESS NOTE ADULT - NSPROGADDITIONALINFOA_GEN_ALL_CORE
Still requiring frequent transfusions.  close monitoring.  d/w pt and NP.  d/w PICC RN at bedside.    Repeat CXR given mild dyspnea. may need Lasix if overloaded.     - [facility] MARY [facility]as [facility]  - [phone]

## 2023-02-16 NOTE — PROVIDER CONTACT NOTE (MEDICATION) - SITUATION
Pt refusing morning medications; Mucinex, Sodium Bicarb, Metoprolol.
pt declining flomax
Cefepime administration due at 0600. Instructions say for admin after x2 blood cultures. Only one was drawn due to pt refusal of blood draws.
Pt refusing medication

## 2023-02-16 NOTE — CONSULT NOTE ADULT - ASSESSMENT
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 She presented here from [facility] for elevated WBC and low hemoglobin, severe weakness.    #Sinus Tachycardia  -Likely in setting of anemia  -Give IVF  -Continue metoprolol 25mg, can increase back to 50mg if systolic bp >100  -Recent echo with nml lv fxn, mod MS  -Transfusions prn per primary  -Ok to give iv diuresis prn with transfusions    #Anemia  -2/2 MDS  -Transfuse prn  -Mgmt per primary, onc    #MDS  -Actively undergoing chemotherapy  -Mgmt per heme/onc    #Pulmonary Vasculitis  -Pulm following  -Cont steroids per pulm    #Hypokalemia  -Replete per primary      Please call/text me with any questions/concerns between 8am-4pm  [phone]

## 2023-02-16 NOTE — CHART NOTE - NSCHARTNOTEFT_GEN_A_CORE
Telephone call with patient's daughter this morning as per patient's request. Discussed her mother's overall condition and risks and benefits of treating her Vasculitis. Conveyed that there is high amount of risk when starting Vasculitis treatment with Rituximab given her current state. She will discuss with her mother and her siblings how aggressive the patient wants to be in treating her Vasculitis.    Plan  - Please repeat CT Chest when patient able to  - obtain Immunoglobulins, Full T cell subset (ordered)  - Hep B negative status January 2023; Quantferon TB negative status November 2022    Discussed with Dr. Aguila Barron, Attending    Jesus Ritter MD  Rheumatology Fellow, PGY-5  Available on TEAMS

## 2023-02-16 NOTE — PROVIDER CONTACT NOTE (MEDICATION) - BACKGROUND
83yo female with dignosis of anemia. Pt AOx4 with PMHx: GERD. HLD, lung mass and anxiety.
Pt presents for fevers and b/l lower extremity edema. Dx COVID, DVT
83 yo female admitted for sepsis
Pt 81 yo female admitted for anemia and sepsis

## 2023-02-16 NOTE — PROVIDER CONTACT NOTE (MEDICATION) - ASSESSMENT
Pt A&O4, Pt refusing morning medications; Mucinex, Sodium Bicarb, Metoprolol.
Pt refusing mouthwash treatments, albuterol treatments and sodium bicarb medication.
Pt education.
Pt A&O3, confused at times. Pt has family at bedside. Due for PM flomax. Pt and family declining medication stating that pt is urinating without difficulty and do not see the reason for taking the med.

## 2023-02-16 NOTE — CONSULT NOTE ADULT - SUBJECTIVE AND OBJECTIVE BOX
CARDIOLOGY CONSULT - Dr. 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 She presented here from [facility] for elevated WBC and low hemoglobin, severe weakness. Pt states for the past 2-3 days has been having increased weakness and lethargy, decreased appetite. +sputum productive cough. + cui, no sob, no difficulty breathing. No abdominal pain, nausea, vomiting, no bloody stools. Has endorsed multiple episodes of loose stools x weeks, currently being treated for c.diff with oral vancomycin.       PAST MEDICAL & SURGICAL HISTORY:  ANCA-associated vasculitis      Anxiety and depression      Pulmonary embolism  2021      DVT, lower extremity  2021      GERD (gastroesophageal reflux disease)      Hyperlipidemia      Lung mass  s/p left wedge resection      DLBCL (diffuse large B cell lymphoma)      History of appendectomy      Lung nodule  pt had wedge resection in 2021      Non-Hodgkins lymphoma  chemoport inserted in 2021              PREVIOUS DIAGNOSTIC TESTING:    [x] Echocardiogram:  < from: TTE with Doppler (w/Cont) (01.20.23 @ 13:34) >  Conclusions:  1. Mitral annular calcification and calcified mitral  leaflets. Peak mitral valve gradient equals 13 mm Hg, mean  transmitral valve gradient equals 6 mm Hg, consistent with  moderate mitral stenosis (heart rate 93 bpm).  2. Mildly dilated left atrium.  LA volume index = 38 cc/m2.  3. Increased relative wall thickness with normal left  ventricular mass index, consistent with concentric left  ventricular remodeling.  4. Normal left ventricular systolic function. No segmental  wall motion abnormalities.   Endocardial visualization  enhanced with intravenous injection of Ultrasonic Enhancing  Agent (Definity).  5. Normal right ventricular size and function.  6. Normal tricuspid valve. Mild-moderate tricuspid  regurgitation.  7. Estimated pulmonary artery systolic pressure equals 52  mm Hg, assuming right atrial pressure equals 8 mm Hg,  consistent with moderate pulmonary pressures.  8. Small pericardial effusion.   No echocardiographic  evidence of pericardial tamponade.  *** No previous Echo exam.    < end of copied text >    [ ]  Catheterization:  [ ] Stress Test:  	    MEDICATIONS:  Home Medications:  acetaminophen 325 mg oral tablet: 2 tab(s) orally every 6 hours, As needed, Mild Pain (1 - 3) (19 Jan 2023 09:26)  Atrovent HFA 17 mcg/inh inhalation aerosol: 2 puff(s) inhaled every 6 hours, As Needed (19 Jan 2023 09:26)  citalopram 10 mg oral tablet: 1 tab(s) orally once a day (19 Jan 2023 09:26)  enoxaparin: 40 milligram(s) subcutaneous once a day (19 Jan 2023 09:26)  ezetimibe 10 mg oral tablet: 1 tab(s) orally once a day (19 Jan 2023 09:26)  folic acid 1 mg oral tablet: 1 tab(s) orally once a day (19 Jan 2023 09:26)  guaiFENesin 600 mg oral tablet, extended release: 1 tab(s) orally every 12 hours (19 Jan 2023 09:26)  loperamide 2 mg oral capsule: 1 cap(s) orally every 4 hours for 48hrs, As Needed (19 Jan 2023 09:26)  melatonin 3 mg oral tablet: 1 tab(s) orally once a day (at bedtime) (19 Jan 2023 09:26)  metoprolol tartrate 25 mg oral tablet: 1 tab(s) orally 2 times a day (19 Jan 2023 09:26)  mirtazapine 7.5 mg oral tablet: 1 tab(s) orally once a day (at bedtime) (19 Jan 2023 09:26)  pantoprazole 40 mg oral delayed release tablet: 1 tab(s) orally once a day (19 Jan 2023 09:26)  polyethylene glycol 3350 oral powder for reconstitution: 17 gram(s) orally once a day (19 Jan 2023 09:26)  predniSONE 20 mg oral tablet: 1 tab(s) orally once a day (19 Jan 2023 09:26)  Robitussin Cough + Chest Congestion DM: 10 milliliter(s) orally 8 times a day, As Needed (19 Jan 2023 09:26)  sodium bicarbonate 650 mg oral tablet: 1 tab(s) orally 2 times a day (19 Jan 2023 09:26)  vancomycin 250 mg/5 mL oral liquid: 5 milliliter(s) orally every 6 hours for 7 days (19 Jan 2023 09:26)  Xopenex HFA 45 mcg/inh inhalation aerosol: 2 puff(s) inhaled every 6 hours (19 Jan 2023 09:26)      MEDICATIONS  (STANDING):  Biotene Dry Mouth Oral Rinse 15 milliLiter(s) Swish and Spit four times a day  budesonide 160 MICROgram(s)/formoterol 4.5 MICROgram(s) Inhaler 2 Puff(s) Inhalation two times a day  chlorhexidine 2% Cloths 1 Application(s) Topical daily  cholecalciferol 2000 Unit(s) Oral daily  citalopram 10 milliGRAM(s) Oral daily  FIRST- Mouthwash  BLM 10 milliLiter(s) Swish and Spit four times a day  fluticasone propionate 50 MICROgram(s)/spray Nasal Spray 1 Spray(s) Both Nostrils two times a day  folic acid 1 milliGRAM(s) Oral daily  guaiFENesin  milliGRAM(s) Oral every 12 hours  influenza  Vaccine (HIGH DOSE) 0.7 milliLiter(s) IntraMuscular once  ipratropium    for Nebulization 500 MICROGram(s) Nebulizer every 6 hours  lactobacillus acidophilus 1 Tablet(s) Oral daily  metoprolol tartrate 25 milliGRAM(s) Oral two times a day  mirtazapine 7.5 milliGRAM(s) Oral daily  pantoprazole    Tablet 40 milliGRAM(s) Oral before breakfast  potassium chloride  20 mEq/100 mL IVPB 20 milliEquivalent(s) IV Intermittent once  predniSONE   Tablet 20 milliGRAM(s) Oral daily  senna 1 Tablet(s) Oral daily  sodium bicarbonate 650 milliGRAM(s) Oral three times a day  sodium chloride 0.65% Nasal 2 Spray(s) Both Nostrils every 6 hours  trimethoprim  160 mG/sulfamethoxazole 800 mG 1 Tablet(s) Oral daily      FAMILY HISTORY:  FH: lung cancer (Sibling)    FH: CAD (coronary artery disease) (Sibling)        SOCIAL HISTORY:    [x] Non-smoker  [ ] Smoker  [ ] Alcohol    Allergies    codeine (Nausea)  doxycycline (Nausea)  penicillin (Hives)    Intolerances    	    REVIEW OF SYSTEMS:  CONSTITUTIONAL: No fever, weight loss, or fatigue  EYES: No eye pain, visual disturbances, or discharge  ENMT:  No difficulty hearing, tinnitus, vertigo; No sinus or throat pain  NECK: No pain or stiffness  RESPIRATORY: No cough, wheezing, chills or hemoptysis; No Shortness of Breath  CARDIOVASCULAR: No chest pain, palpitations, passing out, dizziness, or leg swelling  GASTROINTESTINAL: No abdominal or epigastric pain. No nausea, vomiting, or hematemesis; No diarrhea or constipation. No melena or hematochezia.  GENITOURINARY: No dysuria, frequency, hematuria, or incontinence  NEUROLOGICAL: No headaches, memory loss, loss of strength, numbness, or tremors  SKIN: No itching, burning, rashes, or lesions   	    [x] All others negative	  [ ] Unable to obtain    PHYSICAL EXAM:  T(C): 37.1 (02-16-23 @ 07:51), Max: 37.1 (02-16-23 @ 07:51)  HR: 110 (02-16-23 @ 08:34) (103 - 136)  BP: 110/57 (02-16-23 @ 07:51) (110/57 - 127/73)  RR: 18 (02-16-23 @ 08:34) (17 - 22)  SpO2: 94% (02-16-23 @ 08:34) (92% - 98%)  Wt(kg): --  I&O's Summary    15 Feb 2023 07:01  -  16 Feb 2023 07:00  --------------------------------------------------------  IN: 0 mL / OUT: 100 mL / NET: -100 mL    16 Feb 2023 07:01  -  16 Feb 2023 12:45  --------------------------------------------------------  IN: 120 mL / OUT: 200 mL / NET: -80 mL        Appearance: Elderly female	  Psychiatry: A & O x 3, Mood & affect appropriate  HEENT:   Normal oral mucosa, PERRL, EOMI	  Lymphatic: No lymphadenopathy  Cardiovascular: Normal S1 S2,RRR, No JVD, No murmurs  Respiratory: Lungs clear to auscultation b/l  Gastrointestinal:  Soft, Non-tender, + BS	  Skin: No rashes, No ecchymoses, No cyanosis	  Neurologic: Non-focal  Extremities: Normal range of motion, No clubbing, cyanosis or edema  Vascular: Peripheral pulses palpable 2+ bilaterally    TELEMETRY: 	    ECG:  	  RADIOLOGY:  < from: Xray Chest 1 View- PORTABLE-Urgent (Xray Chest 1 View- PORTABLE-Urgent .) (02.12.23 @ 15:32) >  IMPRESSION:  Resolving infiltrates as noted.    < end of copied text >    OTHER: 	  	  LABS:	 	    CARDIAC MARKERS:                                  7.4    31.79 )-----------( 52       ( 16 Feb 2023 08:10 )             21.7     02-16    138  |  104  |  26<H>  ----------------------------<  153<H>  3.3<L>   |  16<L>  |  0.53    Ca    8.3<L>      16 Feb 2023 04:09  Phos  1.9     02-16  Mg     1.6     02-16    TPro  5.6<L>  /  Alb  3.1<L>  /  TBili  1.1  /  DBili  x   /  AST  20  /  ALT  5<L>  /  AlkPhos  97  02-16      proBNP:   Lipid Profile:   HgA1c:   TSH:

## 2023-02-16 NOTE — PROGRESS NOTE ADULT - ASSESSMENT
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 She presented here from [facility] for elevated WBC and low hemoglobin, severe weakness. Pt states for the past 2-3 days has been having increased weakness and lethargy, decreased appetite. +sputum productive cough. + cui, no sob, no difficulty breathing. No abdominal pain, nausea, vomiting, no bloody stools. Has endorsed multiple episodes of loose stools x weeks, currently being treated for c.diff with oral vancomycin.     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 no BRBPR. occult stool neg.   - she tends to require IV Lasix intermittently post transfusion.  - doubt GI bleed   - Physical therapy. Out of bed to chair with assistance.     Diarrhea, unspecified  - elevated WBC  - no documented c.diff PCR, re-ordered.  - s/p Vanco PO a few days (started in rehab)  - diarrhea completely resolved.   - monitor off PO vanco per ID  - now constipated. PRN bowel regimen started. +BM    Fever, resolved.   - RVP 1/30/23 (-)  - monitor blood cxs 1/30/23  - started empirically on cefepime 1/30/23, switched to merrem 1/31/23 --> 2/3/23  - enterrocus bacteremia, abx per ID. on IV Vanco, last day was 2/10/23  - Macrobid x 5 days course added for VRE in urine, last day was 2/8/23  - blood cultures now negative     AMS  - unclear etiology, likely metabolic encephalopathy from ?Cefepime? received 3 doses, last dose 1/31/23  - CT head neg. sugars stable  - close monitoring   - monitor glucose (glucose 65 on BMP, but 95 on fingersticks)  - encourage PO intake   - mental status improved    Pulmonary vasculitis   - Recent TTE on 11/3/22 reviewed: normal LV. outpt card Dr. Ady Cooper  - outpt pulm Mack Tucker   - s/p thoracoscopic biopsy of mediastinal lymph node and Lung wedge resection 11/10/22  - recent pleural effusion s/p 650 cc U/S-guided rt thoracentesis 11/17/22  - exudative but no neutrophilic predominance and initial Gram stain w/o organisms  - cultures neg.   - path results favoring vasculitis: no evidence for lymphoma. Cytology also came back negative.   - comfortable on nasal canula, better post diuretic last admission.   - rheum and heme-onc following previously, will reconsult.   - last admission, she was not started on immunosuppressants such as cellcept given recent treatment for COVID19 at that time  - c/w prednisone 20 mg qdaily.   - RTX under consideration after administration of dacogen --> acute hepatitis panel (-) and quantiferon indeterminate  - ID started PCP ppx with Bactrim.     hx of Tachycardia    - cont low-dose metoprolol, 50 mg to 25 mg dose reduced in rehab.   - card Dr. Hooker    Anxiety and depression  - stable, continue citalopram and Remeron.  - Pt denied SI/HI ideations, denied visual and auditory hallucinations.     GERD (gastroesophageal reflux disease)  - continue PPI    Hyperlipidemia.   - continue home ezetimibe. okay to hold if nonformulary     Hyponatremia 127 (hypovolemic?)  - renal on the case, resolved.   - s/p 3 days of IV lasix, now completed. electrolytes supplemented.  - O2 weaned off from ventimask to nascal canula to room air.     DVT ppx   - holding AC in the setting of anemia, pancytopenia.   - venodyne boots LEs

## 2023-02-16 NOTE — PROGRESS NOTE ADULT - SUBJECTIVE AND OBJECTIVE BOX
Date of Service: 02-16-23 @ 12:58    Patient is a 82y old  Female who presents with a chief complaint of weakness (16 Feb 2023 12:45)      Any change in ROS: not doing good today  : seems lightly confused;  today  : pretty weak      MEDICATIONS  (STANDING):  Biotene Dry Mouth Oral Rinse 15 milliLiter(s) Swish and Spit four times a day  budesonide 160 MICROgram(s)/formoterol 4.5 MICROgram(s) Inhaler 2 Puff(s) Inhalation two times a day  chlorhexidine 2% Cloths 1 Application(s) Topical daily  cholecalciferol 2000 Unit(s) Oral daily  citalopram 10 milliGRAM(s) Oral daily  FIRST- Mouthwash  BLM 10 milliLiter(s) Swish and Spit four times a day  fluticasone propionate 50 MICROgram(s)/spray Nasal Spray 1 Spray(s) Both Nostrils two times a day  folic acid 1 milliGRAM(s) Oral daily  guaiFENesin  milliGRAM(s) Oral every 12 hours  influenza  Vaccine (HIGH DOSE) 0.7 milliLiter(s) IntraMuscular once  ipratropium    for Nebulization 500 MICROGram(s) Nebulizer every 6 hours  lactobacillus acidophilus 1 Tablet(s) Oral daily  metoprolol tartrate 25 milliGRAM(s) Oral two times a day  mirtazapine 7.5 milliGRAM(s) Oral daily  pantoprazole    Tablet 40 milliGRAM(s) Oral before breakfast  potassium chloride  20 mEq/100 mL IVPB 20 milliEquivalent(s) IV Intermittent once  predniSONE   Tablet 20 milliGRAM(s) Oral daily  senna 1 Tablet(s) Oral daily  sodium bicarbonate 650 milliGRAM(s) Oral three times a day  sodium chloride 0.65% Nasal 2 Spray(s) Both Nostrils every 6 hours  trimethoprim  160 mG/sulfamethoxazole 800 mG 1 Tablet(s) Oral daily    MEDICATIONS  (PRN):  acetaminophen     Tablet .. 650 milliGRAM(s) Oral every 6 hours PRN Temp greater or equal to 38C (100.4F), Mild Pain (1 - 3)  aluminum hydroxide/magnesium hydroxide/simethicone Suspension 30 milliLiter(s) Oral every 4 hours PRN Dyspepsia  benzocaine/menthol Lozenge 1 Lozenge Oral every 8 hours PRN Sore Throat  melatonin 3 milliGRAM(s) Oral at bedtime PRN Insomnia  ondansetron Injectable 4 milliGRAM(s) IV Push every 8 hours PRN Nausea and/or Vomiting  polyethylene glycol 3350 17 Gram(s) Oral daily PRN Constipation    Vital Signs Last 24 Hrs  T(C): 37.1 (16 Feb 2023 07:51), Max: 37.1 (16 Feb 2023 07:51)  T(F): 98.7 (16 Feb 2023 07:51), Max: 98.7 (16 Feb 2023 07:51)  HR: 110 (16 Feb 2023 08:34) (103 - 136)  BP: 110/57 (16 Feb 2023 07:51) (110/57 - 127/73)  BP(mean): --  RR: 18 (16 Feb 2023 08:34) (17 - 22)  SpO2: 94% (16 Feb 2023 08:34) (92% - 98%)    Parameters below as of 16 Feb 2023 08:34  Patient On (Oxygen Delivery Method): nasal cannula        I&O's Summary    15 Feb 2023 07:01  -  16 Feb 2023 07:00  --------------------------------------------------------  IN: 0 mL / OUT: 100 mL / NET: -100 mL    16 Feb 2023 07:01  -  16 Feb 2023 12:58  --------------------------------------------------------  IN: 120 mL / OUT: 200 mL / NET: -80 mL          Physical Exam:   GENERAL: NAD, well-groomed, well-developed  HEENT: RANJIT/   Atraumatic, Normocephalic  ENMT: No tonsillar erythema, exudates, or enlargement; Moist mucous membranes, Good dentition, No lesions  NECK: Supple, No JVD, Normal thyroid  CHEST/LUNG: Clear to auscultaion- scattered cracekls  CVS: Regular rate and rhythm; No murmurs, rubs, or gallops  GI: : Soft, Nontender, Nondistended; Bowel sounds present  NERVOUS SYSTEM:  Alert & Oriented X2-3   EXTREMITIES:  -edema  LYMPH: No lymphadenopathy noted  SKIN: No rashes or lesions  ENDOCRINOLOGY: No Thyromegaly  PSYCH: calm     Labs:                              7.4    31.79 )-----------( 52       ( 16 Feb 2023 08:10 )             21.7                         7.2    33.45 )-----------( 5        ( 16 Feb 2023 04:14 )             21.7                         8.2    42.40 )-----------( 4        ( 15 Feb 2023 07:42 )             24.8                         8.9    53.70 )-----------( 8        ( 14 Feb 2023 06:38 )             26.8                         7.7    61.18 )-----------( 43       ( 13 Feb 2023 16:41 )             23.1                         6.4    66.95 )-----------( 7        ( 13 Feb 2023 09:22 )             19.7                         7.0    70.94 )-----------( 11       ( 12 Feb 2023 17:26 )             20.8     02-16    138  |  104  |  26<H>  ----------------------------<  153<H>  3.3<L>   |  16<L>  |  0.53  02-15    137  |  102  |  30<H>  ----------------------------<  149<H>  2.9<LL>   |  21<L>  |  0.65  02-14    140  |  106  |  29<H>  ----------------------------<  119<H>  3.5   |  19<L>  |  0.80  02-13    138  |  103  |  32<H>  ----------------------------<  149<H>  4.2   |  21<L>  |  1.04    Ca    8.3<L>      16 Feb 2023 04:09  Ca    8.5      15 Feb 2023 07:42  Phos  1.9     02-16  Phos  3.0     02-15  Mg     1.6     02-16  Mg     1.8     02-15    TPro  5.6<L>  /  Alb  3.1<L>  /  TBili  1.1  /  DBili  x   /  AST  20  /  ALT  5<L>  /  AlkPhos  97  02-16  TPro  6.1  /  Alb  3.4  /  TBili  1.0  /  DBili  x   /  AST  18  /  ALT  6<L>  /  AlkPhos  104  02-15  TPro  6.4  /  Alb  3.3  /  TBili  1.6<H>  /  DBili  x   /  AST  21  /  ALT  6<L>  /  AlkPhos  103  02-14  TPro  5.9<L>  /  Alb  3.2<L>  /  TBili  1.0  /  DBili  x   /  AST  21  /  ALT  5<L>  /  AlkPhos  94  02-13    CAPILLARY BLOOD GLUCOSE          LIVER FUNCTIONS - ( 16 Feb 2023 04:09 )  Alb: 3.1 g/dL / Pro: 5.6 g/dL / ALK PHOS: 97 U/L / ALT: 5 U/L / AST: 20 U/L / GGT: x                     RECENT CULTURES:  02-12 @ 17:54 .Blood Blood-Peripheral            rad< from: Xray Chest 1 View- PORTABLE-Urgent (Xray Chest 1 View- PORTABLE-Urgent .) (02.12.23 @ 15:32) >  HISTORY: Fever    COMPARISON STUDY: 2/1/2023    Frontal expiratory view of the chest shows the heart to be normal in   size. Left chest port is present with catheter tip at the SVC right   atrial junction.    The lungs show clearing of the lungs with atelectasis or small residual   infiltrate of the lower right lung and there is no evidenceof   pneumothorax nor pleural effusion.    IMPRESSION:  Resolving infiltrates as noted.        Thank you for the courtesy of this referral.    --- End of Report ---    < end of copied text >      No growth to date.    02-12 @ 17:26 .Blood Blood-Peripheral                No growth to date.          RESPIRATORY CULTURES:          Studies  Chest X-RAY  CT SCAN Chest   Venous Dopplers: LE:   CT Abdomen  Others

## 2023-02-16 NOTE — PROGRESS NOTE ADULT - SUBJECTIVE AND OBJECTIVE BOX
[facility] [place]   MD RONEL FRIAS MD, DO KARELY DANIELSON NP    TEL:  OFFICE: [phone]    From 5pm-7am Answering Service [phone]    -- RENAL FOLLOW UP NOTE ---Date of Service 02-16-23 @ 13:27    Patient is a 82y old  Female who presents with a chief complaint of weakness (16 Feb 2023 12:58)      Patient seen and examined at bedside. No chest pain/sob    VITALS:  T(F): 98.7 (02-16-23 @ 07:51), Max: 98.7 (02-16-23 @ 07:51)  HR: 110 (02-16-23 @ 08:34)  BP: 110/57 (02-16-23 @ 07:51)  RR: 18 (02-16-23 @ 08:34)  SpO2: 94% (02-16-23 @ 08:34)  Wt(kg): --    02-15 @ 07:01  -  02-16 @ 07:00  --------------------------------------------------------  IN: 0 mL / OUT: 100 mL / NET: -100 mL    02-16 @ 07:01  -  02-16 @ 13:27  --------------------------------------------------------  IN: 120 mL / OUT: 200 mL / NET: -80 mL          PHYSICAL EXAM:  Constitutional: NAD  Neck: No JVD  Respiratory: CTAB, no wheezes, rales or rhonchi  Cardiovascular: S1, S2, RRR  Gastrointestinal: BS+, soft, NT/ND  Extremities: No peripheral edema    Hospital Medications:   MEDICATIONS  (STANDING):  Biotene Dry Mouth Oral Rinse 15 milliLiter(s) Swish and Spit four times a day  budesonide 160 MICROgram(s)/formoterol 4.5 MICROgram(s) Inhaler 2 Puff(s) Inhalation two times a day  chlorhexidine 2% Cloths 1 Application(s) Topical daily  cholecalciferol 2000 Unit(s) Oral daily  citalopram 10 milliGRAM(s) Oral daily  FIRST- Mouthwash  BLM 10 milliLiter(s) Swish and Spit four times a day  fluticasone propionate 50 MICROgram(s)/spray Nasal Spray 1 Spray(s) Both Nostrils two times a day  folic acid 1 milliGRAM(s) Oral daily  guaiFENesin  milliGRAM(s) Oral every 12 hours  influenza  Vaccine (HIGH DOSE) 0.7 milliLiter(s) IntraMuscular once  ipratropium    for Nebulization 500 MICROGram(s) Nebulizer every 6 hours  lactobacillus acidophilus 1 Tablet(s) Oral daily  metoprolol tartrate 25 milliGRAM(s) Oral two times a day  mirtazapine 7.5 milliGRAM(s) Oral daily  pantoprazole    Tablet 40 milliGRAM(s) Oral before breakfast  potassium chloride  20 mEq/100 mL IVPB 20 milliEquivalent(s) IV Intermittent once  predniSONE   Tablet 20 milliGRAM(s) Oral daily  senna 1 Tablet(s) Oral daily  sodium bicarbonate 650 milliGRAM(s) Oral three times a day  sodium bicarbonate  Infusion 0.108 mEq/kG/Hr (75 mL/Hr) IV Continuous <Continuous>  sodium chloride 0.65% Nasal 2 Spray(s) Both Nostrils every 6 hours  trimethoprim  160 mG/sulfamethoxazole 800 mG 1 Tablet(s) Oral daily      LABS:  02-16    138  |  104  |  26<H>  ----------------------------<  153<H>  3.3<L>   |  16<L>  |  0.53    Ca    8.3<L>      16 Feb 2023 04:09  Phos  1.9     02-16  Mg     1.6     02-16    TPro  5.6<L>  /  Alb  3.1<L>  /  TBili  1.1  /  DBili      /  AST  20  /  ALT  5<L>  /  AlkPhos  97  02-16    Creatinine Trend: 0.53 <--, 0.65 <--, 0.80 <--, 1.04 <--, 0.83 <--, 0.81 <--, 0.73 <--    Albumin, Serum: 3.1 g/dL (02-16 @ 04:09)  Phosphorus Level, Serum: 1.9 mg/dL (02-16 @ 04:09)                              7.4    31.79 )-----------( 52       ( 16 Feb 2023 08:10 )             21.7     Urine Studies:  Urinalysis - [02-01-23 @ 09:57]      Color Yellow / Appearance Slightly Turbid / SG 1.033 / pH 7.0      Gluc Negative / Ketone Trace  / Bili Negative / Urobili Negative       Blood Small / Protein 300 mg/dL / Leuk Est Negative / Nitrite Negative      RBC 4 /  / Hyaline 4 / Gran  / Sq Epi  / Non Sq Epi 10 / Bacteria Moderate      Iron 152, TIBC 180, %sat 84      [01-19-23 @ 11:19]  Ferritin 5768      [01-19-23 @ 11:19]  Vitamin D (25OH) 28.1      [01-19-23 @ 11:19]  Lipid: chol 84, TG 81, HDL 22, LDL --      [10-01-22 @ 07:10]    HBsAg Nonreact      [01-25-23 @ 07:18]  HBcAb Nonreact      [01-25-23 @ 07:18]  HCV 0.15, Nonreact      [01-25-23 @ 07:18]    MPO-ANCA <5.0, interpretation: Negative      [02-02-23 @ 07:22]  PR3-ANCA <5.0, interpretation: Negative      [02-02-23 @ 07:22]  Free Light Chains: kappa 4.65, lambda 2.60, ratio = 1.79      [01-25 @ 07:18]    RADIOLOGY & ADDITIONAL STUDIES:

## 2023-02-16 NOTE — PROGRESS NOTE ADULT - SUBJECTIVE AND OBJECTIVE BOX
SUBJECTIVE/ OVERNIGHT EVENTS:  events reviewed  low plts  1 unit plts given  bleeding around port  PICC RN changing dressing   tachycardia, card consulted.   no cp, no sob, no n/v/d. no abdominal pain.  no headache, no dizziness.   no melena, no hematochezia. no BRBPR.     --------------------------------------------------------------------------------------------  LABS:                        7.4    31.79 )-----------( 52       ( 16 Feb 2023 08:10 )             21.7     02-16    138  |  104  |  26<H>  ----------------------------<  153<H>  3.3<L>   |  16<L>  |  0.53    Ca    8.3<L>      16 Feb 2023 04:09  Phos  1.9     02-16  Mg     1.6     02-16    TPro  5.6<L>  /  Alb  3.1<L>  /  TBili  1.1  /  DBili  x   /  AST  20  /  ALT  5<L>  /  AlkPhos  97  02-16      CAPILLARY BLOOD GLUCOSE                RADIOLOGY & ADDITIONAL TESTS:    Imaging Personally Reviewed:  [x] YES  [ ] NO    Consultant(s) Notes Reviewed:  [x] YES  [ ] NO    MEDICATIONS  (STANDING):  Biotene Dry Mouth Oral Rinse 15 milliLiter(s) Swish and Spit four times a day  budesonide 160 MICROgram(s)/formoterol 4.5 MICROgram(s) Inhaler 2 Puff(s) Inhalation two times a day  chlorhexidine 2% Cloths 1 Application(s) Topical daily  cholecalciferol 2000 Unit(s) Oral daily  citalopram 10 milliGRAM(s) Oral daily  FIRST- Mouthwash  BLM 10 milliLiter(s) Swish and Spit four times a day  fluticasone propionate 50 MICROgram(s)/spray Nasal Spray 1 Spray(s) Both Nostrils two times a day  folic acid 1 milliGRAM(s) Oral daily  guaiFENesin  milliGRAM(s) Oral every 12 hours  influenza  Vaccine (HIGH DOSE) 0.7 milliLiter(s) IntraMuscular once  ipratropium    for Nebulization 500 MICROGram(s) Nebulizer every 6 hours  lactobacillus acidophilus 1 Tablet(s) Oral daily  metoprolol tartrate 25 milliGRAM(s) Oral two times a day  mirtazapine 7.5 milliGRAM(s) Oral daily  pantoprazole    Tablet 40 milliGRAM(s) Oral before breakfast  potassium chloride    Tablet ER 40 milliEquivalent(s) Oral once  predniSONE   Tablet 20 milliGRAM(s) Oral daily  senna 1 Tablet(s) Oral daily  sodium bicarbonate 650 milliGRAM(s) Oral three times a day  sodium chloride 0.65% Nasal 2 Spray(s) Both Nostrils every 6 hours  sodium phosphate 15 milliMole(s)/250 mL IVPB 15 milliMole(s) IV Intermittent once  trimethoprim  160 mG/sulfamethoxazole 800 mG 1 Tablet(s) Oral daily    MEDICATIONS  (PRN):  acetaminophen     Tablet .. 650 milliGRAM(s) Oral every 6 hours PRN Temp greater or equal to 38C (100.4F), Mild Pain (1 - 3)  aluminum hydroxide/magnesium hydroxide/simethicone Suspension 30 milliLiter(s) Oral every 4 hours PRN Dyspepsia  benzocaine/menthol Lozenge 1 Lozenge Oral every 8 hours PRN Sore Throat  melatonin 3 milliGRAM(s) Oral at bedtime PRN Insomnia  ondansetron Injectable 4 milliGRAM(s) IV Push every 8 hours PRN Nausea and/or Vomiting  polyethylene glycol 3350 17 Gram(s) Oral daily PRN Constipation      Care Discussed with Consultants/Other Providers [x] YES  [ ] NO    Vital Signs Last 24 Hrs  T(C): 37.1 (16 Feb 2023 07:51), Max: 37.1 (16 Feb 2023 07:51)  T(F): 98.7 (16 Feb 2023 07:51), Max: 98.7 (16 Feb 2023 07:51)  HR: 110 (16 Feb 2023 08:34) (103 - 136)  BP: 110/57 (16 Feb 2023 07:51) (110/57 - 127/73)  BP(mean): --  RR: 18 (16 Feb 2023 08:34) (17 - 22)  SpO2: 94% (16 Feb 2023 08:34) (92% - 98%)    Parameters below as of 16 Feb 2023 08:34  Patient On (Oxygen Delivery Method): nasal cannula      I&O's Summary    15 Feb 2023 07:01  -  16 Feb 2023 07:00  --------------------------------------------------------  IN: 0 mL / OUT: 100 mL / NET: -100 mL    16 Feb 2023 07:01  -  16 Feb 2023 11:17  --------------------------------------------------------  IN: 120 mL / OUT: 0 mL / NET: 120 mL        PHYSICAL EXAM:  GENERAL: NAD, well-developed, comfortable on room air  HEAD:  Atraumatic, Normocephalic  EYES: EOMI, PERRLA, conjunctiva and sclera clear  NECK: Supple, No JVD  CHEST/LUNG: mild decrease breath sounds bilaterally; No wheeze   HEART: Regular rate and rhythm; No murmurs, rubs, or gallops  ABDOMEN: Soft, Nontender, Nondistended; Bowel sounds present  Neuro: awake alert, back to baseline mental status. no focal weakness  EXTREMITIES:  2+ Peripheral Pulses, No clubbing, cyanosis, trace b/l edema  SKIN: superficial ecchymoses.  lower lip hematoma, no bleeding

## 2023-02-16 NOTE — CONSULT NOTE ADULT - TIME BILLING
Agree with above PA note.  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 She presented here from [facility] for elevated WBC and low hemoglobin, severe weakness.    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

## 2023-02-16 NOTE — PROGRESS NOTE ADULT - ASSESSMENT
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 She presented here from [facility] for elevated WBC and low hemoglobin, severe weakness. Pt states for the past 2-3 days has been having increased weakness and lethargy, decreased appetite. +sputum productive cough. + cui, no sob, no difficulty breathing. No abdominal pain, nausea, vomiting, no bloody stools. Has endorsed multiple episodes of loose stools x weeks, currently being treated for c.diff with oral vancomycin. Nephrology consulted for Hyponatremia.       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,   s/p salt tab 1g tid   improved  continue to hold salt tab and monitor   continue fluid restriction <1.2L a day   Avoid overcorrection >6-8meq a day   monitor Na closely     Acidosis with/without anion gap   2/2 GI loss   suggest to give sodium bicarb drip 75cc/hr x1L as ordered   continue give oral bicarb  monitor    Hypokalemia   on KCL 20meq iv   repeat K and give more supplement as needed   monitor K     HTN   stable   monitor BP closely     Anemia   heme/onc on board   PRBC as needed   monitor H/H     hypophosphatemia   s/p GI loss   suggest to give potassium phosphate ivp as ordered   supplement as needed   monitor

## 2023-02-16 NOTE — PROGRESS NOTE ADULT - ASSESSMENT
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 Reports chronic cough, currently nonproductive - RVP/COVID negative. Has multiple episodes of loose stools x weeks, reported recently trying marijuana for appetite and noted worsening. She was given oral vancomycin empirically for C.diff but then became constipated, s/p bowel regimen and having normal bowel movements. Patient initially being monitored off antibiotics given she was afebrile, WBC was stable and no infectious source was found. She had a fever 100.9F on 1/30 overnight with worsening leukocytosis and then 101.4F overnight 1/31 and noted with confusion and found to have sepsis due to GP bacteremia.   H/o PCN allergy with hives    Fever -resolved   - febrile x1 2/12, pt asx, no further fevers, WBC trend noted  - RVP/COVID negative   - CXR with no focal infiltrate/consolidation seen  - L chest port accessed with no s/o infection  - no new focal findings on exam  - blood cultures negative   - continue to monitor off abx other than bactrim ppx  - monitor temps/CBC    Sepsis due to gram positive bacteremia, cleared and treated    E. faecalis bacteremia - unclear source, ?GI, less likely    Staph epi bacteremia ?skin vs port source vs contamination  AMS - neurotoxicity d/t cefepime vs infectious etiology   --d/c'ed cefepime 1/31, mental status improved   - 1/30 Bcx (1 set sent) with Staph epi - MRSE -- sensitivities noted   - 1/31 Bcx (1 set sent) - aerobic bottle grew E. faecalis (amp/vanc sensitive) and MRSE  - 2/1 repeat Bcx -- Negative x2   - TTE w/o mention of vegetations  - s/p vancomycin-completed 10d course 2/10    UTI with E. 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  H/o DLBCL, EBV+   - s/p BMBx 1/23 - found with new MDS   - quantiferon indeterminate - pt on steroids which can cause indeterminate results    - CT - s/p wedge resections in b/l upper lobes, 2 cm nodular opacity a/w staple line in RUL; multiple ill-defined b/l opacities more in RLL -unclear etiology, b/l effusions R>L   - continue on Bactrim DS 1 tablet daily for PCP ppx while on prednisone   - Rheum and Heme/Onc following - started on chemotherapy with decitabine x5d on 2/10   - lip lesions with dried blood, ordered HSV PCR (pt reports h/o cold sores in the past)    L epistaxis   - ENT following, packing removed      Yevgeniy Malave M.D.  [facility], Division of Infectious Diseases  [phone]  After 5pm on weekdays and all day on weekends - please call [phone]

## 2023-02-17 NOTE — PROGRESS NOTE ADULT - SUBJECTIVE AND OBJECTIVE BOX
[facility] DIVISION OF INFECTIOUS DISEASES  ANDREW Rodríguez Y. Patel, S. Shah, G. Casimir  [phone]  ([phone] - weekdays after 5pm and weekends)    Name: BETTIE NGUYEN  Age/Gender: 82y Female  MRN: [id number]    Interval History:  Patient seen and examined this morning.   States she feels unwell but unable to elaborate  Denies fever, chills, chest pain, dyspnea, cough  Denies abd pain, n/v, no BM  Notes reviewed. Afebrile.     Allergies: codeine (Nausea)  doxycycline (Nausea)  penicillin (Hives)    Objective:  Vitals:   T(F): 98.1 (02-17-23 @ 05:43), Max: 98.1 (02-16-23 @ 14:26)  HR: 104 (02-17-23 @ 05:43) (104 - 130)  BP: 114/51 (02-17-23 @ 05:43) (104/62 - 114/51)  RR: 19 (02-17-23 @ 05:43) (19 - 20)  SpO2: 97% (02-17-23 @ 05:43) (97% - 100%)  Physical Examination:  General: chronically ill appearing, NAD, NC  HEENT: NC/AT, anicteric, dried bloody lesion on lips, neck supple  Respiratory: decreased breath sounds b/l  Cardiovascular: S1 and S2 present, tachycardia   Gastrointestinal: soft, nontender, nondistended  Neuro: AAOx2-3, states she is at [facility], answers/follows   Extremities: no edema, no cyanosis  Skin: warm, dry, no visible rash, ecchymosis   Lines: L chest port accessed with no TTP/erythema    Laboratory Studies:  CBC:                       7.4    29.33 )-----------( 10       ( 17 Feb 2023 07:40 )             21.7     WBC Trend:  29.33 02-17-23 @ 07:40  30.15 02-17-23 @ 07:14  35.77 02-16-23 @ 17:54  31.79 02-16-23 @ 08:10  33.45 02-16-23 @ 04:14  42.40 02-15-23 @ 07:42  53.70 02-14-23 @ 06:38  61.18 02-13-23 @ 16:41  66.95 02-13-23 @ 09:22  70.94 02-12-23 @ 17:26  62.29 02-12-23 @ 07:05  60.71 02-11-23 @ 07:05    CMP: 02-17    142  |  105  |  34<H>  ----------------------------<  120<H>  3.3<L>   |  22  |  0.72    Ca    7.7<L>      17 Feb 2023 06:34  Phos  3.6     02-17  Mg     1.6     02-16    TPro  5.4<L>  /  Alb  2.9<L>  /  TBili  2.3<H>  /  DBili  x   /  AST  30  /  ALT  8<L>  /  AlkPhos  106  02-17    Creatinine, Serum: 0.72 mg/dL (02-17-23 @ 06:34)  Creatinine, Serum: 0.53 mg/dL (02-16-23 @ 04:09)  Creatinine, Serum: 0.65 mg/dL (02-15-23 @ 07:42)  Creatinine, Serum: 0.80 mg/dL (02-14-23 @ 06:38)  Creatinine, Serum: 1.04 mg/dL (02-13-23 @ 09:22)  Creatinine, Serum: 0.83 mg/dL (02-12-23 @ 07:04)  Creatinine, Serum: 0.81 mg/dL (02-11-23 @ 07:05)    LIVER FUNCTIONS - ( 17 Feb 2023 06:34 )  Alb: 2.9 g/dL / Pro: 5.4 g/dL / ALK PHOS: 106 U/L / ALT: 8 U/L / AST: 30 U/L / GGT: x           Microbiology: reviewed   Culture - Blood (collected 02-12-23 @ 17:54)  Source: .Blood Blood-Peripheral  Preliminary Report (02-13-23 @ 22:02):    No growth to date.    Culture - Blood (collected 02-12-23 @ 17:26)  Source: .Blood Blood-Peripheral  Preliminary Report (02-13-23 @ 22:02):    No growth to date.    Radiology: reviewed   Xray Chest 1 View- PORTABLE-Urgent (Xray Chest 1 View- PORTABLE-Urgent .) (02.16.23 @ 13:59) >IMPRESSION: Bibasilar hazy opacities, right worse than left, and small left sided  pleural effusion.    VA Duplex Lower Ext Vein Scan, Bilat (02.16.23 @ 17:00) >IMPRESSION: No evidence of deep venous thrombosis in either lower extremity.    Medications:  acetaminophen     Tablet .. 650 milliGRAM(s) Oral every 6 hours PRN  aluminum hydroxide/magnesium hydroxide/simethicone Suspension 30 milliLiter(s) Oral every 4 hours PRN  benzocaine/menthol Lozenge 1 Lozenge Oral every 8 hours PRN  Biotene Dry Mouth Oral Rinse 15 milliLiter(s) Swish and Spit four times a day  budesonide 160 MICROgram(s)/formoterol 4.5 MICROgram(s) Inhaler 2 Puff(s) Inhalation two times a day  chlorhexidine 2% Cloths 1 Application(s) Topical daily  cholecalciferol 2000 Unit(s) Oral daily  citalopram 10 milliGRAM(s) Oral daily  FIRST- Mouthwash  BLM 10 milliLiter(s) Swish and Spit four times a day  fluticasone propionate 50 MICROgram(s)/spray Nasal Spray 1 Spray(s) Both Nostrils two times a day  folic acid 1 milliGRAM(s) Oral daily  guaiFENesin  milliGRAM(s) Oral every 12 hours  influenza  Vaccine (HIGH DOSE) 0.7 milliLiter(s) IntraMuscular once  ipratropium    for Nebulization 500 MICROGram(s) Nebulizer every 6 hours  lactobacillus acidophilus 1 Tablet(s) Oral daily  melatonin 3 milliGRAM(s) Oral at bedtime PRN  metoprolol tartrate 25 milliGRAM(s) Oral two times a day  mirtazapine 7.5 milliGRAM(s) Oral daily  ondansetron Injectable 4 milliGRAM(s) IV Push every 8 hours PRN  pantoprazole    Tablet 40 milliGRAM(s) Oral before breakfast  polyethylene glycol 3350 17 Gram(s) Oral daily PRN  potassium chloride  10 mEq/100 mL IVPB 10 milliEquivalent(s) IV Intermittent every 1 hour  predniSONE   Tablet 20 milliGRAM(s) Oral daily  senna 1 Tablet(s) Oral daily  sodium bicarbonate 650 milliGRAM(s) Oral three times a day  sodium chloride 0.65% Nasal 2 Spray(s) Both Nostrils every 6 hours  trimethoprim  160 mG/sulfamethoxazole 800 mG 1 Tablet(s) Oral daily    Current Antimicrobials:  trimethoprim  160 mG/sulfamethoxazole 800 mG 1 Tablet(s) Oral daily    Prior/Completed Antimicrobials:  cefepime   IVPB  nitrofurantoin monohydrate/macrocrystals (MACROBID)  vancomycin  IVPB

## 2023-02-17 NOTE — PROGRESS NOTE ADULT - SUBJECTIVE AND OBJECTIVE BOX
[facility] [place] [facility]   MD RONEL FRIAS MD, DO KARELY DANIELSON NP    TEL:  OFFICE: [phone]    From 5pm-7am Answering Service [phone]    -- RENAL FOLLOW UP NOTE ---Date of Service 02-17-23 @ 14:31    Patient is a 82y old  Female who presents with a chief complaint of weakness (17 Feb 2023 14:03)      Patient seen and examined at bedside. No chest pain/sob    VITALS:  T(F): 97.2 (02-17-23 @ 13:55), Max: 98.1 (02-17-23 @ 05:43)  HR: 106 (02-17-23 @ 13:55)  BP: 138/81 (02-17-23 @ 13:55)  RR: 18 (02-17-23 @ 13:55)  SpO2: 100% (02-17-23 @ 13:55)  Wt(kg): --    02-16 @ 07:01  -  02-17 @ 07:00  --------------------------------------------------------  IN: 530 mL / OUT: 200 mL / NET: 330 mL    02-17 @ 07:01  -  02-17 @ 14:31  --------------------------------------------------------  IN: 480 mL / OUT: 0 mL / NET: 480 mL          PHYSICAL EXAM:  Constitutional: NAD  Neck: No JVD  Respiratory: CTAB, no wheezes, rales or rhonchi  Cardiovascular: S1, S2, RRR  Gastrointestinal: BS+, soft, NT/ND  Extremities: No peripheral edema    Hospital Medications:   MEDICATIONS  (STANDING):  Biotene Dry Mouth Oral Rinse 15 milliLiter(s) Swish and Spit four times a day  budesonide 160 MICROgram(s)/formoterol 4.5 MICROgram(s) Inhaler 2 Puff(s) Inhalation two times a day  chlorhexidine 2% Cloths 1 Application(s) Topical daily  cholecalciferol 2000 Unit(s) Oral daily  citalopram 10 milliGRAM(s) Oral daily  FIRST- Mouthwash  BLM 10 milliLiter(s) Swish and Spit four times a day  fluticasone propionate 50 MICROgram(s)/spray Nasal Spray 1 Spray(s) Both Nostrils two times a day  folic acid 1 milliGRAM(s) Oral daily  guaiFENesin  milliGRAM(s) Oral every 12 hours  influenza  Vaccine (HIGH DOSE) 0.7 milliLiter(s) IntraMuscular once  ipratropium    for Nebulization 500 MICROGram(s) Nebulizer every 6 hours  lactobacillus acidophilus 1 Tablet(s) Oral daily  metoprolol tartrate 25 milliGRAM(s) Oral two times a day  mirtazapine 7.5 milliGRAM(s) Oral daily  pantoprazole    Tablet 40 milliGRAM(s) Oral before breakfast  predniSONE   Tablet 20 milliGRAM(s) Oral daily  senna 1 Tablet(s) Oral daily  sodium bicarbonate 650 milliGRAM(s) Oral three times a day  sodium chloride 0.65% Nasal 2 Spray(s) Both Nostrils every 6 hours  trimethoprim  160 mG/sulfamethoxazole 800 mG 1 Tablet(s) Oral daily      LABS:  02-17    142  |  105  |  34<H>  ----------------------------<  120<H>  3.3<L>   |  22  |  0.72    Ca    7.7<L>      17 Feb 2023 06:34  Phos  3.6     02-17  Mg     1.6     02-16    TPro  5.4<L>  /  Alb  2.9<L>  /  TBili  2.3<H>  /  DBili      /  AST  30  /  ALT  8<L>  /  AlkPhos  106  02-17    Creatinine Trend: 0.72 <--, 0.53 <--, 0.65 <--, 0.80 <--, 1.04 <--, 0.83 <--, 0.81 <--    Albumin, Serum: 2.9 g/dL (02-17 @ 06:34)  Phosphorus Level, Serum: 3.6 mg/dL (02-17 @ 06:34)                              6.3    28.48 )-----------( 84       ( 17 Feb 2023 12:25 )             18.4     Urine Studies:  Urinalysis - [02-01-23 @ 09:57]      Color Yellow / Appearance Slightly Turbid / SG 1.033 / pH 7.0      Gluc Negative / Ketone Trace  / Bili Negative / Urobili Negative       Blood Small / Protein 300 mg/dL / Leuk Est Negative / Nitrite Negative      RBC 4 /  / Hyaline 4 / Gran  / Sq Epi  / Non Sq Epi 10 / Bacteria Moderate      Iron 152, TIBC 180, %sat 84      [01-19-23 @ 11:19]  Ferritin 5768      [01-19-23 @ 11:19]  Vitamin D (25OH) 28.1      [01-19-23 @ 11:19]  Lipid: chol 84, TG 81, HDL 22, LDL --      [10-01-22 @ 07:10]    HBsAg Nonreact      [01-25-23 @ 07:18]  HBcAb Nonreact      [01-25-23 @ 07:18]  HCV 0.15, Nonreact      [01-25-23 @ 07:18]    MPO-ANCA <5.0, interpretation: Negative      [02-02-23 @ 07:22]  PR3-ANCA <5.0, interpretation: Negative      [02-02-23 @ 07:22]  Free Light Chains: kappa 4.65, lambda 2.60, ratio = 1.79      [01-25 @ 07:18]    RADIOLOGY & ADDITIONAL STUDIES:

## 2023-02-17 NOTE — PROGRESS NOTE ADULT - ASSESSMENT
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 She presented here from [facility] for elevated WBC and low hemoglobin, severe weakness. Pt states for the past 2-3 days has been having increased weakness and lethargy, decreased appetite. +sputum productive cough. + cui, no sob, no difficulty breathing. No abdominal pain, nausea, vomiting, no bloody stools. Has endorsed multiple episodes of loose stools x weeks, currently being treated for c.diff with oral vancomycin. Nephrology consulted for Hyponatremia.       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

## 2023-02-17 NOTE — PROGRESS NOTE ADULT - ASSESSMENT
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 She presented here from [facility] for elevated WBC and low hemoglobin, severe weakness. Pt states for the past 2-3 days has been having increased weakness and lethargy, decreased appetite. +sputum productive cough. + cui, no sob, no difficulty breathing. No abdominal pain, nausea, vomiting, no bloody stools. Has endorsed multiple episodes of loose stools x weeks, currently being treated for c.diff with oral vancomycin.     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 no BRBPR. occult stool neg.   - she tends to require IV Lasix intermittently post transfusion.  - doubt GI bleed   - Physical therapy. Out of bed to chair with assistance.     failure to thrive  - appears weak, fatigue  requiring numerous plts and prbc transfusions  CT chest worsening  ID / pulm/ rheum following  Lasix IV ordered since she received numerous transfusions.    Diarrhea, unspecified: resolved.   - elevated WBC  - no documented c.diff PCR, re-ordered.  - s/p Vanco PO a few days (started in rehab)  - diarrhea completely resolved.   - monitor off PO vanco per ID  - now constipated. PRN bowel regimen started. +BM    Fever, resolved.   - RVP 1/30/23 (-)  - monitor blood cxs 1/30/23  - started empirically on cefepime 1/30/23, switched to merrem 1/31/23 --> 2/3/23  - enterrocus bacteremia, abx per ID. on IV Vanco, last day was 2/10/23  - Macrobid x 5 days course added for VRE in urine, last day was 2/8/23  - blood cultures now negative     AMS  - unclear etiology, likely metabolic encephalopathy from ?Cefepime? received 3 doses, last dose 1/31/23  - CT head neg. sugars stable  - close monitoring   - monitor glucose (glucose 65 on BMP, but 95 on fingersticks)  - encourage PO intake   - mental status improved    Pulmonary vasculitis   - Recent TTE on 11/3/22 reviewed: normal LV. outpt card Dr. Ady Cooper  - outpt pulm Mack Tucker   - s/p thoracoscopic biopsy of mediastinal lymph node and Lung wedge resection 11/10/22  - recent pleural effusion s/p 650 cc U/S-guided rt thoracentesis 11/17/22  - exudative but no neutrophilic predominance and initial Gram stain w/o organisms  - cultures neg.   - path results favoring vasculitis: no evidence for lymphoma. Cytology also came back negative.   - comfortable on nasal canula, better post diuretic last admission.   - rheum and heme-onc following previously, will reconsult.   - last admission, she was not started on immunosuppressants such as cellcept given recent treatment for COVID19 at that time  - c/w prednisone 20 mg qdaily.   - RTX under consideration after administration of dacogen --> acute hepatitis panel (-) and quantiferon indeterminate  - ID started PCP ppx with Bactrim.     hx of Tachycardia    - cont low-dose metoprolol, 50 mg to 25 mg dose reduced in rehab.   - card Dr. Hooker    Anxiety and depression  - stable, continue citalopram and Remeron.  - Pt denied SI/HI ideations, denied visual and auditory hallucinations.     GERD (gastroesophageal reflux disease)  - continue PPI    Hyperlipidemia.   - continue home ezetimibe. okay to hold if nonformulary     Hyponatremia 127 (hypovolemic?)  - renal on the case, resolved.   - s/p 3 days of IV lasix, now completed. electrolytes supplemented.  - O2 weaned off from ventimask to nascal canula to room air.     DVT ppx   - holding AC in the setting of anemia, pancytopenia.   - venodyne boots LEs

## 2023-02-17 NOTE — PROGRESS NOTE ADULT - ASSESSMENT
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 She presented here from [facility] for elevated WBC and low hemoglobin, severe weakness. Pt states for the past 2-3 days has been having increased weakness and lethargy, decreased appetite. +sputum productive cough. + cui, no sob, no difficulty breathing. No abdominal pain, nausea, vomiting, no bloody stools. Has endorsed multiple episodes of loose stools x weeks, currently being treated for c.diff with oral vancomycin.       Fatigue/dyspnea: likely due to severe anemia :  pt s/p 1 unit PRB  Diarrhea  Pulmonary vasculitis  :  hx of Tachycardia :  Recent TTE on 11/3/22 reviewed: normal LV. outpt card Dr. Ady Cooper  Anxiety and depression  GERD (gastroesophageal reflux disease)  Hyperlipidemia.   DVT ppx     1/20:  Fatigue/dyspnea: likely due to severe anemia :  pt s/p 1 unit PRBC: hb now  > 10  : she is on 2 L of oxygen : no wheezing: no overt signs of bleeding : ct chest is abnormal report noted:  has jean-claude ill defined opacities of unclear etiology  : venous ph is mild acidotic:  no need for Bipap at this time : monitor and trend hb  Diarrhea : symptomatic rx:  workup per primary team  Pulmonary vasculitis  : per rheumatology  : had recent vats surgery : LN biopsy noted:   hx of Tachycardia :  Recent TTE on 11/3/22 reviewed: normal LV. outpt card Dr. Ady Cooper  Anxiety and depression  GERD (gastroesophageal reflux disease) : ppi   Hyperlipidemia.   recent covid  : o n last admission she was treated for covid infection:   DVT ppx   d wteam    1/22:    Fatigue/dyspnea: likely due to severe anemia :  pt s/p 1 unit PRBC: hb now  > 10  : she is on 2 L of oxygen : no wheezing: no overt signs of bleeding : ct chest is abnormal report noted:  has jean-claude ill defined opacities of unclear etiology  : venous ph is mild acidotic:  no need for Bipap at this time : monitor and trend hb: she remained stable o gracie last 1 days:  she is not on any antibtiocs at this time: RVP  and blood cultures are negative: she had ebus biopsy also before: reviewed: ID following  Diarrhea : symptomatic rx:  workup per primary team : seems to have resolved  Pulmonary vasculitis  : per rheumatology  : had recent vats surgery : LN biopsy noted:   Bicytopneia and leucocytosis: ? etiology  : for possible bone marrow biopsy on Monday    hx of Tachycardia :  Recent TTE on 11/3/22 reviewed: normal LV. outpt card Dr. Ady Cooper  Anxiety and depression  GERD (gastroesophageal reflux disease) : ppi   Hyperlipidemia.   recent covid  : on last admission she was treated for covid infection:   DVT ppx   dw team    1/23:    Fatigue/dyspnea: likely due to severe anemia : feels weak  but no bleeding noted:  on 2 L of oxygen : she needs PT OT   Diarrhea :resolved:   Pulmonary vasculitis  : per rheumatology  : had recent vats surgery : LN biopsy noted: : she never received teat ment for vasculitis except low dose steroids:  she is awaiting bone marrow biopsy prior to induction therapy with rituximab: The ct chest done on this admission does not show pneumothorax and has jean-claude effusions:  There are some new opacites in rigth lower lobe which were not therre:  clinically she does not seem to have pneumonia: ID following: could it be a prt of vasculitis  Bicytopneia and leucocytosis: ? etiology  : for possible bone marrow biopsy today  hx of Tachycardia :  Recent TTE on 11/3/22 reviewed: normal LV. outpt card Dr. Ady Cooper  Anxiety and depression  GERD (gastroesophageal reflux disease) : ppi   Hyperlipidemia.   recent covid  : on last admission she was treated for covid infection:   DVT ppx   dw team    1/24:    Fatigue/dyspnea: likely due to severe anemia : feels weak  but no bleeding noted:  on 2 L of oxygen : she needs PT OT   Diarrhea :resolved:   Pulmonary vasculitis  : per rheumatology  : had recent vats surgery : LN biopsy noted: : she never received teat ment for vasculitis except low dose steroids:  she is awaiting bone marrow biopsy prior to induction therapy with rituximab: The ct chest done on this admission does not show pneumothorax and has jean-claude effusions:  There are some new opacites in rigth lower lobe which were not therre:  clinically she does not seem to have pneumonia: ID following: could it be a prt of vasculitis  Bicytopneia and leucocytosis: BM biopsy done: await results for eventual immunosuppressives therapy:   hx of Tachycardia :  Recent TTE on 11/3/22 reviewed: normal LV. outpt card Dr. Ady Cooper  Anxiety and depression  GERD (gastroesophageal reflux disease) : ppi   Hyperlipidemia.   recent covid  : on last admission she was treated for covid infection:   DVT ppx   dw team    1/25:    Fatigue/dyspnea: likely due to severe anemia : feels weak  but no bleeding noted:  on 2 L of oxygen : she needs PT OT   Diarrhea :resolved:   Pulmonary vasculitis  : per rheumatology  : had recent vats surgery : LN biopsy noted: : she never received teat ment for vasculitis except low dose steroids:  she is awaiting bone marrow biopsy prior to induction therapy with rituximab: The ct chest done on this admission does not show pneumothorax and has jean-claude effusions:  There are some new opacites in rigth lower lobe which were not there:  clinically she does not seem to have pneumonia: ID following: could it be a part of vasculitis : her resp status has not changed   Bicytopneia and leucocytosis: BM biopsy done: await results for still pending   hx of Tachycardia :  Recent TTE on 11/3/22 reviewed: normal LV. outpt card Dr. Ady Cooper  Anxiety and depression  GERD (gastroesophageal reflux disease) : ppi   Hyperlipidemia.   recent covid  : on last admission she was treated for covid infection:   DVT ppx   dw team: awaiting decision on immunosuppression     1/26;      Fatigue/dyspnea: likely due to severe anemia : feels weak  but no bleeding noted:  on 2 L of oxygen : she needs PT OT : got it yesterday    Diarrhea :resolved:   Pulmonary vasculitis  : per rheumatology  : had recent vats surgery : LN biopsy noted: : she never received teat ment for vasculitis except low dose steroids:  she is awaiting bone marrow biopsy prior to induction therapy with rituximab: The ct chest done on this admission does not show pneumothorax and has jean-claude effusions:  There are some new opacities in rigth lower lobe which were not there:  clinically she does not seem to have pneumonia: ID following: could it be a part of vasculitis : her resp status has not changed  : being observed off antibiotics   Bicytopneia and leucocytosis: BM biopsy done: await results for still pending   hx of Tachycardia :  Recent TTE on 11/3/22 reviewed: normal LV. outpt card Dr. Ady Cooper  Anxiety and depression  GERD (gastroesophageal reflux disease) : ppi   Hyperlipidemia.   recent covid  : on last admission she was treated for covid infection:   DVT ppx   dw team: awaiting decision on immunosuppression     1/27:    Fatigue/dyspnea: likely due to severe anemia : feels weak  but no bleeding noted:  on 2 L of oxygen : cont  PT OT :   Diarrhea :resolved:   Pulmonary vasculitis  : per rheumatology  : had recent vats surgery : LN biopsy noted: : she never received teat ment for vasculitis except low dose steroids:  she is awaiting bone marrow biopsy prior to induction therapy with rituximab: The ct chest done on this admission does not show pneumothorax and has jean-claude effusions:  There are some new opacities in rigth lower lobe which were not there:  clinically she does not seem to have pneumonia: ID following: could it be a part of vasculitis : her resp status has not changed  : being observed off antibiotics :  on bactrim prophylaxis as she is on chr steroids   Bicytopneia and leucocytosis: BM biopsy done: await results for still pending   hx of Tachycardia :  Recent TTE on 11/3/22 reviewed: normal LV. outpt card Dr. Ady Cooper  Anxiety and depression  GERD (gastroesophageal reflux disease) : ppi   Hyperlipidemia.   recent covid  : on last admission she was treated for covid infection:   DVT ppx   dw team: awaiting decision on immunosuppression     1/29:    Fatigue/dyspnea: likely due to severe anemia : feels weak  but no bleeding noted:  on 2 L of oxygen : cont  PT OT :   Diarrhea :resolved:   Pulmonary vasculitis  : per rheumatology  : had recent vats surgery : LN biopsy noted: : she never received treat ment for vasculitis except low dose steroids:  she is awaiting bone marrow biopsy prior to induction therapy with rituximab: The ct chest done on this admission does not show pneumothorax and has jean-claude effusions:  There are some new opacities in rigth lower lobe which were not there:  clinically she does not seem to have pneumonia: ID following: could it be a part of vasculitis : her resp status has not changed  : being observed off antibiotics :  on bactrim prophylaxis as she is on chr steroids   Bicytopneia and leucocytosis: BM biopsy: results reviewed defer to [facility]  hx of Tachycardia :  Recent TTE on 11/3/22 reviewed: normal LV.   Anxiety and depression  GERD (gastroesophageal reflux disease) : ppi   Hyperlipidemia.   recent covid  : on last admission she was treated for covid infection:   DVT ppx   dw team: awaiting decision on immunosuppression     1/30:    Fatigue/dyspnea: likely due to severe anemia : feels weak  but no bleeding noted:  on 2 L of oxygen : cont  PT OT : sitting in chair today   Diarrhea :resolved:   Pulmonary vasculitis  : per rheumatology  : had recent vats surgery : LN biopsy noted: : she never received treat ment for vasculitis except low dose steroids:  she is awaiting bone marrow biopsy prior to induction therapy with rituximab: The ct chest done on this admission does not show pneumothorax and has jean-claude effusions:  There are some new opacities in rigth lower lobe which were not there:  clinically she does not seem to have pneumonia: ID following: could it be a part of vasculitis : her resp status has not changed  : being observed off antibiotics :  on bactrim prophylaxis as she is on chr steroids  /l however she had low grade tempt today : rvp is negative : cultures sent: ID follow up   Bicytopneia and leucocytosis: BM biopsy: results reviewed defer to [facility]  hx of Tachycardia :  Recent TTE on 11/3/22 reviewed: normal LV.   Anxiety and depression  GERD (gastroesophageal reflux disease) : ppi   Hyperlipidemia.   recent covid  : on last admission she was treated for covid infection:   DVT ppx   dw team: awaiting decision on immunosuppression     1/31:    Fatigue/dyspnea: likely due to severe anemia : feels weak  but no bleeding noted:  on 2 L of oxygen : cont  PT OT : lying in bed:   Diarrhea :resolved:   Pulmonary vasculitis  : per rheumatology  : had recent vats surgery : LN biopsy noted: : she never received treat ment for vasculitis except low dose steroids:  she is awaiting bone marrow biopsy prior to induction therapy with rituximab: The ct chest done on this admission does not show pneumothorax and has jean-claude effusions:  There are some new opacities in rigth lower lobe which were not there:  clinically she does not seem to have pneumonia: ID following: could it be a part of vasculitis : her resp status has not changed  : being observed off antibiotics :  on bactrim prophylaxis as she is on chr steroids : she seems OK:  no sob:     Bicytopneia and leucocytosis: BM biopsy: results reviewed defer to [facility] ; for eventual chemo   hx of Tachycardia :  Recent TTE on 11/3/22 reviewed: normal LV.   Anxiety and depression  GERD (gastroesophageal reflux disease) : ppi   Hyperlipidemia.   recent covid  : on last admission she was treated for covid infection:   DVT ppx   dw team: awaiting decision on immunosuppression     2/1:    Fatigue/dyspnea: likely due to severe anemia : feels weak  but no bleeding noted:  on 2 L of oxygen : cont  PT OT : lying in bed:  her blood cultures are contaminant:   Diarrhea :resolved:   Pulmonary vasculitis  : per rheumatology  : had recent vats surgery : LN biopsy noted: : she never received treat ment for vasculitis except low dose steroids:  she is awaiting bone marrow biopsy prior to induction therapy with rituximab: The ct chest done on this admission does not show pneumothorax and has jean-claude effusions:  There are some new opacities in rigth lower lobe which were not there:  clinically she does not seem to have pneumonia: ID following: could it be a part of vasculitis : her resp status has not changed  : being observed off antibiotics :  on bactrim prophylaxis as she is on chr steroids : she seems OK:  no sob:     Bicytopneia and leucocytosis: BM biopsy: results reviewed defer to [facility] ; for eventual chemo   hx of Tachycardia :  Recent TTE on 11/3/22 reviewed: normal LV.   Anxiety and depression  GERD (gastroesophageal reflux disease) : ppi   Hyperlipidemia.   recent covid  : on last admission she was treated for covid infection:   DVT ppx   dw team: awaiting decision on immunosuppression: overall her general condition seems very poor and very weak:     2/2:    Fatigue/dyspnea: likely due to severe anemia : feels weak  but no bleeding noted:  on 2 L of oxygen : cont  PT OT : lying in bed:  her blood cultures are positive E FAECALIS :  ON MEROPENEM  Diarrhea :resolved:   Pulmonary vasculitis  : per rheumatology  : had recent vats surgery : LN biopsy noted: : she never received treat ment for vasculitis except low dose steroids:  she is awaiting bone marrow biopsy prior to induction therapy with rituximab: The ct chest done on this admission does not show pneumothorax and has jean-claude effusions:  There are some new opacities in rigth lower lobe which were not there:  clinically she does not seem to have pneumonia: ID following: could it be a part of vasculitis : her resp status has not changed  : being observed off antibiotics :  on bactrim prophylaxis as she is on chr steroids : she seems OK:  no sob:     Bicytopneia and leucocytosis: BM biopsy: results reviewed defer to [facility] ; for eventual chemo   hx of Tachycardia :  Recent TTE on 11/3/22 reviewed: normal LV.   Anxiety and depression  GERD (gastroesophageal reflux disease) : ppi   Hyperlipidemia.   recent covid  : on last admission she was treated for covid infection:   DVT ppx   dw team: awaiting decision on immunosuppression: overall her general condition seems very poor and very weak: ON ANTIBIOTICS     2/3   Pulmonary Vasculitis  -Steroids per rheum  -Plan for eventual Rituxan  -Bactrim for PCP ppx  MDS  -Per heme/onc  Bacteremia  -E. Faecalis bacteremia  -ABX per ID  Epistaxis  -Per ENT  -Breathing comfortably on 40% humidified face tent, keep sats >90%    2/6:    2/6  Pulmonary Vasculitis  -Steroids per rheum  -Plan for eventual Rituxan /l she is on room air at this time  -Bactrim for PCP ppx  MDS  -Per heme/onc  Bacteremia  -E. Faecalis bacteremia  -ABX per ID  Epistaxis  -Per ENT  - she is on room air today  : cont to mantain o2 sao2 above 90% all the ti me:     acp      2/7:  Pulmonary Vasculitis  -Steroids per rheum  -Plan for eventual Rituxan /l she is on room air at this time  -Bactrim for PCP ppx  MDS  -Per heme/onc  Bacteremia  -E. Faecalis bacteremia  -ABX per ID : awaiting clearance from id for chemo : last blood cultures have been negative  Epistaxis  -Per ENT  - she is on room air today  : cont to mantain o2 sao2 above 90% all the ti me:     acp    2/8:  Pulmonary Vasculitis  -Steroids per rheum  -Plan for eventual Rituxan /l she is on room air at this time  -Bactrim for PCP ppx  MDS  -Per heme/onc  Bacteremia  -E. Faecalis bacteremia  -ABX per ID : awaiting clearance from id for chemo : last blood cultures have been negative : finishing antbiotics today  :  Epistaxis  -Per ENT  - she is on room air today  : cont to mantain o2 sao2 above 90% all the ti me:  Thrombocytopenia:  sign today  : 14k: no obvious bleeding: cont to monitorial : transfuse per [facility]mo[facility]     acp      2/9:    Pulmonary Vasculitis : Steroids per rheum : Plan for eventual Rituxan /l she is on room air at this time : Bactrim for PCP ppx : on room air: with no change in her resp status  MDS : s'p BM biopsy : has MDS : defer to hemonc  Bacteremia E. Faecalis bacteremia  -ABX per ID : awaiting clearance from id for chemo : last blood cultures have been negative : finished antibiotics   Epistaxis Per ENT : she is on room air today  : cont to mantain o2 sao2 above 90% all the ti me:  Thrombocytopenia:  sign today  : 10k: no obvious bleeding: cont to monitorial : transfuse per [facility] :? for plt transfusion  today      dw acp    2/10:  Pulmonary Vasculitis : Steroids per rheum : Plan for eventual Rituxan /l she is on room air at this time : Bactrim for PCP ppx : on room air: with no change in her resp status : now plan for rituximab aftger 5 days of dacogen   MDS : s'p BM biopsy : has MDS : defer to hemonc  Bacteremia E. Faecalis bacteremia  -ABX per ID : awaiting clearance from id for chemo : last blood cultures have been negative : finished antibiotics   Epistaxis Per ENT : she is on room air today  : cont to mantain o2 sao2 above 90% all the ti me:  Thrombocytopenia:  still low, but much better,  no obvious bleeding: cont to monitorial :  dw acp    2/12:  Pulmonary Vasculitis : Steroids per rheum : Plan for eventual Rituxan /l she is on room air at this time : Bactrim for PCP ppx : on room air: with no change in her resp status : now plan for rituximab after 5 days of dacogen : today is day 3 : doing  ok : no resp idstress  MDS : s'p BM biopsy : has MDS : defer to hemonc  Bacteremia E. Faecalis bacteremia  -ABX per ID : awaiting clearance from id for chemo : last blood cultures have been negative : finished antibiotics   Epistaxis Per ENT : she is on room air today  : cont to mantain o2 sao2 above 90% all the ti me:  Thrombocytopenia:  still low, but much better,  no obvious bleeding: cont to monitor :  dw acp      2/13:  Low grade temperature : pancultured:  chest xray with improvement in infiltrates : id following : no antibiotics yet:   Pulmonary Vasculitis : Steroids per rheum : Plan for eventual Rituxan.  she is on room air at this time : Bactrim for PCP ppx : on 2 L of oxygen today : rpt chest xray noted:   MDS : s'p BM biopsy : has MDS : defer to [facility]monc  Bacteremia E. Faecalis bacteremia  -ABX per ID : awaiting clearance from id for chemo : last blood cultures have been negative : finished antibiotics   Epistaxis Per ENT : resolved  Thrombocytopenia:  for transfusion today    dw acp : pt looks pretty weak and cachectic:      2/14;  Low grade temperature :resolved  pancultured:  chest xray with improvement in infiltrates : id following : no antibiotics yet:   Pulmonary Vasculitis : Steroids per rheum : Plan for eventual Rituxan.  she is on room air at this time : Bactrim for PCP ppx : on 2 L of oxygen today : rpt chest xray noted:   MDS : s'p BM biopsy : has MDS : defer to [facility]mon[facility]  Bacteremia E. Faecalis bacteremia: resolved:   Epistaxis Per ENT : resolved  Thrombocytopenia:  for transfusion today    dw acp : pt looks pretty weak and cachectic:        2/15:    Low grade temperature :resolved  pancultured:  chest xray with improvement in infiltrates : id following : no antibiotics yet: still   Pulmonary Vasculitis : Steroids per rheum : Plan for eventual Rituxan.  she is on room air at this time : Bactrim for PCP ppx : on 2 L of oxygen today : rpt chest xray noted: for rituximab per rheum:   MDS :with increased blasts:  s'p BM biopsy : has MDS : defer to [facility]mon[facility] : gettng chemo   Bacteremia E. Faecalis bacteremia: resolved:   Epistaxis Per ENT : resolved  Thrombocytopenia:  for transfusion today    dw acp : pt looks pretty weak and cachectic:  :  cont current suppotive care: p lats transfusion    2/16:    Hypoxic resp failure:  her o2 requirement has increased today  : chest xray ordered: and ABG orderd:  she also seems confused today :   Low grade temperature :resolved  pancultured:  chest xray with improvement in infiltrates : id following : no antibiotics yet: still   Pulmonary Vasculitis : Steroids per rheum : Plan for eventual Rituxan.  she is on room air at this time : Bactrim for PCP ppx : on 4 L of oxygen today : rpt chest xray orderd: : for rituximab per rheum:   MDS :with increased blasts:  s'p BM biopsy : has MDS : defer to [facility]monc : gettng chemo   Bacteremia E. Faecalis bacteremia: resolved:   Epistaxis Per ENT : resolved  Thrombocytopenia:  for transfusion today     pt looks pretty weak and cachectic:  :  cont current supportive care: p lats transfusion:  today she isnot looking good: more hypoxic: await abg"  dw acp    2/17:    Hypoxic resp failure:  her o2 requirement has increased today  : chest xray ordered: and ABG orderd:  she also seems confused today : CT SCAN CHEST REVIEWED:  SHOWED; Multiple bilateral nodular and opacities many of which have increased in  size or are new since prior study on 1/18/2023. Findings are concerning  for worsening infection although superimposed progression of vasculitis  can have a similar appearance. Short interval follow-up CT scan in one  month is recommended. Mild interval improvement of the right lung base  patchy opacities as compared with January 18, 2023. Nodular opacity in the right upper lobe along the suture line mildly  increased in size, an indeterminate finding. Correlation with the biopsy  pathology results is recommended. Slight improvement of the pleural effusions bilaterally. Interlobular  septal thickening suggestive of mild pulmonary edema. SHE HAS WORSENING CT SCAN CHEST AND SHEIS REQUIRING TRANSFUSIONS ALMOST DAILY : WORSENING OF VASCULITIS ? FOR RITUXIMAB : ID FOLLOW UP NEEDS TO BE DONE:  OVERALL SHEIS DOING PRETTY POORLY:   Low grade temperature :resolved  pancultured:  chest xray with improvement in infiltrates : id following : no antibiotics yet: still   Pulmonary Vasculitis : Steroids per rheum : Plan for eventual Rituxan.  she is on room air at this time : Bactrim for PCP ppx : on 4 L of oxygen today :  for rituximab per rheum:   MDS :with increased blasts:  s'p BM biopsy : has MDS : defer to [facility]mon[facility] : gettng chemo   Bacteremia E. Faecalis bacteremia: resolved:   Epistaxis Per ENT : resolved  Thrombocytopenia:  for transfusion today     pt looks pretty weak and cachectic:  :  cont current supportive care: p lats transfusion:  today she is not looking good: TODAY TOO  more hypoxic: abg YETERAY WAS PRETTY DECENT; WITH MILD RESP ALKALOSIS AND ME ACIDOSIS:   demi [facility]

## 2023-02-17 NOTE — PROGRESS NOTE ADULT - SUBJECTIVE AND OBJECTIVE BOX
Date of Service: 02-17-23 @ 14:30    Patient is a 82y old  Female who presents with a chief complaint of weakness (17 Feb 2023 14:03)      Any change in ROS:  doing OK: but very weak     MEDICATIONS  (STANDING):  Biotene Dry Mouth Oral Rinse 15 milliLiter(s) Swish and Spit four times a day  budesonide 160 MICROgram(s)/formoterol 4.5 MICROgram(s) Inhaler 2 Puff(s) Inhalation two times a day  chlorhexidine 2% Cloths 1 Application(s) Topical daily  cholecalciferol 2000 Unit(s) Oral daily  citalopram 10 milliGRAM(s) Oral daily  FIRST- Mouthwash  BLM 10 milliLiter(s) Swish and Spit four times a day  fluticasone propionate 50 MICROgram(s)/spray Nasal Spray 1 Spray(s) Both Nostrils two times a day  folic acid 1 milliGRAM(s) Oral daily  guaiFENesin  milliGRAM(s) Oral every 12 hours  influenza  Vaccine (HIGH DOSE) 0.7 milliLiter(s) IntraMuscular once  ipratropium    for Nebulization 500 MICROGram(s) Nebulizer every 6 hours  lactobacillus acidophilus 1 Tablet(s) Oral daily  metoprolol tartrate 25 milliGRAM(s) Oral two times a day  mirtazapine 7.5 milliGRAM(s) Oral daily  pantoprazole    Tablet 40 milliGRAM(s) Oral before breakfast  predniSONE   Tablet 20 milliGRAM(s) Oral daily  senna 1 Tablet(s) Oral daily  sodium bicarbonate 650 milliGRAM(s) Oral three times a day  sodium chloride 0.65% Nasal 2 Spray(s) Both Nostrils every 6 hours  trimethoprim  160 mG/sulfamethoxazole 800 mG 1 Tablet(s) Oral daily    MEDICATIONS  (PRN):  acetaminophen     Tablet .. 650 milliGRAM(s) Oral every 6 hours PRN Temp greater or equal to 38C (100.4F), Mild Pain (1 - 3)  aluminum hydroxide/magnesium hydroxide/simethicone Suspension 30 milliLiter(s) Oral every 4 hours PRN Dyspepsia  benzocaine/menthol Lozenge 1 Lozenge Oral every 8 hours PRN Sore Throat  melatonin 3 milliGRAM(s) Oral at bedtime PRN Insomnia  ondansetron Injectable 4 milliGRAM(s) IV Push every 8 hours PRN Nausea and/or Vomiting  polyethylene glycol 3350 17 Gram(s) Oral daily PRN Constipation    Vital Signs Last 24 Hrs  T(C): 36.2 (17 Feb 2023 13:55), Max: 36.7 (17 Feb 2023 05:43)  T(F): 97.2 (17 Feb 2023 13:55), Max: 98.1 (17 Feb 2023 05:43)  HR: 106 (17 Feb 2023 13:55) (102 - 112)  BP: 138/81 (17 Feb 2023 13:55) (93/57 - 138/81)  BP(mean): 84 (16 Feb 2023 18:57) (84 - 84)  RR: 18 (17 Feb 2023 13:55) (16 - 20)  SpO2: 100% (17 Feb 2023 13:55) (97% - 100%)    Parameters below as of 17 Feb 2023 13:55  Patient On (Oxygen Delivery Method): nasal cannula  O2 Flow (L/min): 3      I&O's Summary    16 Feb 2023 07:01  -  17 Feb 2023 07:00  --------------------------------------------------------  IN: 530 mL / OUT: 200 mL / NET: 330 mL    17 Feb 2023 07:01  -  17 Feb 2023 14:30  --------------------------------------------------------  IN: 480 mL / OUT: 0 mL / NET: 480 mL          Physical Exam:   GENERAL: NAD, well-groomed, well-developed  HEENT: RANJIT/   Atraumatic, Normocephalic  ENMT: No tonsillar erythema, exudates, or enlargement; Moist mucous membranes, Good dentition, No lesions  NECK: Supple, No JVD, Normal thyroid  CHEST/LUNG: Clear to auscultaion- NO WHEEZING  CVS: Regular rate and rhythm; No murmurs, rubs, or gallops  GI: : Soft, Nontender, Nondistended; Bowel sounds present  NERVOUS SYSTEM:  awake:  on 2 L OF OXYGEN    EXTREMITIES:  - edema  LYMPH: No lymphadenopathy noted  SKIN: No rashes or lesions  ENDOCRINOLOGY: No Thyromegaly  PSYCH: Appropriate    Labs:  ABG - ( 16 Feb 2023 17:49 )  pH, Arterial: 7.44  pH, Blood: x     /  pCO2: 28    /  pO2: 74    / HCO3: 19    / Base Excess: -3.8  /  SaO2: 97.5                                        6.3    28.48 )-----------( 84       ( 17 Feb 2023 12:25 )             18.4                         7.4    29.33 )-----------( 10       ( 17 Feb 2023 07:40 )             21.7                         7.5    30.15 )-----------( 11       ( 17 Feb 2023 07:14 )             22.1                         6.2    35.77 )-----------( 8        ( 16 Feb 2023 17:54 )             18.3                         7.4    31.79 )-----------( 52       ( 16 Feb 2023 08:10 )             21.7                         7.2    33.45 )-----------( 5        ( 16 Feb 2023 04:14 )             21.7                         8.2    42.40 )-----------( 4        ( 15 Feb 2023 07:42 )             24.8                         8.9    53.70 )-----------( 8        ( 14 Feb 2023 06:38 )             26.8     02-17    142  |  105  |  34<H>  ----------------------------<  120<H>  3.3<L>   |  22  |  0.72  02-16    138  |  104  |  26<H>  ----------------------------<  153<H>  3.3<L>   |  16<L>  |  0.53  02-15    137  |  102  |  30<H>  ----------------------------<  149<H>  2.9<LL>   |  21<L>  |  0.65  02-14    140  |  106  |  29<H>  ----------------------------<  119<H>  3.5   |  19<L>  |  0.80    Ca    7.7<L>      17 Feb 2023 06:34  Ca    8.3<L>      16 Feb 2023 04:09  Phos  3.6     02-17  Phos  1.9     02-16  Mg     1.6     02-16    TPro  5.4<L>  /  Alb  2.9<L>  /  TBili  2.3<H>  /  DBili  x   /  AST  30  /  ALT  8<L>  /  AlkPhos  106  02-17  TPro  5.6<L>  /  Alb  3.1<L>  /  TBili  1.1  /  DBili  x   /  AST  20  /  ALT  5<L>  /  AlkPhos  97  02-16  TPro  6.1  /  Alb  3.4  /  TBili  1.0  /  DBili  x   /  AST  18  /  ALT  6<L>  /  AlkPhos  104  02-15  TPro  6.4  /  Alb  3.3  /  TBili  1.6<H>  /  DBili  x   /  AST  21  /  ALT  6<L>  /  AlkPhos  103  02-14    CAPILLARY BLOOD GLUCOSE          LIVER FUNCTIONS - ( 17 Feb 2023 06:34 )  Alb: 2.9 g/dL / Pro: 5.4 g/dL / ALK PHOS: 106 U/L / ALT: 8 U/L / AST: 30 U/L / GGT: x                     RECENT CULTURES:  02-12 @ 17:54 .Blood Blood-Peripheral                No growth to date.    02-12 @ 17:26 .Blood Blood-Peripheral                No growth to date.    RAD< from: CT Chest No Cont (02.17.23 @ 11:12) >  LYMPH NODES: Unchanged mediastinal and hilar lymphadenopathy.    HEART/VASCULATURE: Cardiomegaly. No pericardial effusion. Coronary artery   and aortic calcifications. MediPort catheter tip terminates in the right   atrium.    AIRWAYS/LUNGS/PLEURA: Patent central airways. Bilateral upper lobe wedge   resections. Previously seen nodular opacity along the right upper lobe   wedge resection suture line demonstrate mild interval increase since   prior study on 1/18/2023, currently measuring approximately 2.0 x 1.7 cm   (3-51), previously measuring 1.1 x 1.6 cm. In addition the ovoid shaped   nodular opacity containing punctate calcifications in the left upper lobe   hasalso increased in size currently measuring 1.6 x 1.1 cm (3-44),   previously 1.3 x 0.8 cm. Overall increase or progression of many of the   previously noted bilateral patchy opacities involving all lobes as   compared to prior study on 1/18/2023. Slight interval improvement of the   previously seen right lower lobe patchy opacities at the right lung base   since January 18, 2023. Mild interlobular septal thickening. Small to   moderate partially loculated right and small left pleural effusions, of   which have minimally improved with associated compressive atelectasis. No   pneumothorax. Bibasilar areas of atelectasis.    UPPER ABDOMEN: Large hiatal hernia.    BONES/SOFT TISSUES: Left chest wall MediPort. Chronic T11 vertebral body   compression deformity. Degenerative changes of the spine.    IMPRESSION:    Multiple bilateral nodular and opacities many of which have increased in   size or are new since prior study on 1/18/2023. Findings are concerning   for worsening infection although superimposed progression of vasculitis   can have a similar appearance. Short interval follow-up CT scan in one   month is recommended. Mild interval improvement of the right lung base   patchy opacities as compared with January 18, 2023.    Nodular opacity in the right upper lobe along the suture line mildly   increased in size, an indeterminate finding. Correlation with the biopsy   pathology results is recommended.    Slight improvement of the pleural effusions bilaterally. Interlobular   septalthickening suggestive of mild pulmonary edema.    --- End of Report ---    < end of copied text >        RESPIRATORY CULTURES:          Studies  Chest X-RAY  CT SCAN Chest   Venous Dopplers: LE:   CT Abdomen  Others

## 2023-02-17 NOTE — PROGRESS NOTE ADULT - ASSESSMENT
Echo 1/20/23: Nml lv fxn EF 62%, Mod MS, b/l pleural effusions      A/P  82 yr old female with a PMHx of diffuse large B cell lymphoma in remission, non-hodgkin's lymphoma (chemoport), depression, HLD, GERD, PE/DVT (4/2021 no longer on Eliquis), ANCA-vasculitis (20 y/a, no meds), s/p LVATS, LUIS wedge resection (2021), recent direct admit for RVATS, RUL Nodule Biopsy w/ IR marking in [facility], path favoring vasculitis, treated with Decadron, then switched to PO prednisone, went to [facility]. She presented here from [facility] for elevated WBC and low hemoglobin, severe weakness.    #Sinus Tachycardia  -Likely in setting of anemia  -Give IVF  -Continue metoprolol 25mg, can increase back to 50mg if systolic bp >100  -Recent echo with nml lv fxn, mod MS  -Transfusions prn per primary  -Ok to give iv diuresis prn with transfusions  -Repeat duplex to r/o dvt b/l    #Anemia  -2/2 MDS  -Transfuse prn  -Mgmt per primary, onc    #MDS  -Actively undergoing chemotherapy  -Mgmt per heme/onc    #Pulmonary Vasculitis  -Pulm following  -Cont steroids per pulm    #Hypokalemia  -Replete per primary      Please call/text me with any questions/concerns between 8am-4pm  [phone]

## 2023-02-17 NOTE — PROGRESS NOTE ADULT - ASSESSMENT
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 Reports chronic cough, currently nonproductive - RVP/COVID negative. Has multiple episodes of loose stools x weeks, reported recently trying marijuana for appetite and noted worsening. She was given oral vancomycin empirically for C.diff but then became constipated, s/p bowel regimen and having normal bowel movements. Patient initially being monitored off antibiotics given she was afebrile, WBC was stable and no infectious source was found. She had a fever 100.9F on 1/30 overnight with worsening leukocytosis and then 101.4F overnight 1/31 and noted with confusion and found to have sepsis due to GP bacteremia.   H/o PCN allergy with hives    Fever -resolved   - febrile x1 2/12, pt asx, no further fevers, WBC trend noted  - RVP/COVID negative, Bcx negative   - 2/12 CXR with no focal infiltrate/consolidation seen  - L chest port accessed with no s/o infection  - continue to monitor off abx other than bactrim ppx  - monitor temps/CBC    Sepsis due to gram positive bacteremia, cleared and treated    E. faecalis bacteremia - unclear source, ?GI, less likely    Staph epi bacteremia ?skin vs port source vs contamination  AMS - neurotoxicity d/t cefepime vs infectious etiology   --d/c'ed cefepime 1/31, mental status improved   - 1/30 Bcx (1 set sent) with Staph epi - MRSE -- sensitivities noted   - 1/31 Bcx (1 set sent) - aerobic bottle grew E. faecalis (amp/vanc sensitive) and MRSE  - 2/1 repeat Bcx -- Negative x2   - TTE w/o mention of vegetations  - s/p vancomycin-completed 10d course 2/10    UTI with E. 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   - CT showed s/p wedge resections in b/l upper lobes, 2 cm nodular opacity a/w staple line in RUL; multiple ill-defined b/l opacities more in RLL -unclear etiology, b/l effusions R>L   - continue on Bactrim DS 1 tablet daily for PCP ppx while on prednisone   - quantiferon indeterminate - pt on steroids which can cause indeterminate results; was negative Nov 2022  - noted to be more hypoxic on NC 4L, repeat CXR with bibasilar hazy opacities R>L and small L sided effusion   - Rheumatology following, noted ongoing discussions with family regarding vasculitis treatment   - if patient spikes fever -- send Bcx x2 and start on empiric meropenem/vancomycin (will avoid cefepime, h/o PCN allergy)    Fatigue/dyspnea likely due to severe anemia  Newly diagnosed MDS s/p BMbx 1/23  H/o DLBCL, EBV+  L epistaxis, resolved   Thrombocytopenia    - Heme/Onc following - s/p chemotherapy with decitabine x5d on 2/10-2/14   - remains afebrile, WBC downtrended, not neutropenic    - lip lesions with dried blood, (pt reports h/o cold sores in the past)- re-ordered HSV PCR (was cancelled by lab)    -- if positive, start on valacyclovir       Over the weekend Dr. Naila Rogers will be covering for our group. If you have any questions, concerns or new micro data, please reach out to them at [phone]    Yevgeniy Malave M.D.  [facility], Division of Infectious Diseases  [phone]  After 5pm on weekdays and all day on weekends - please call [phone]   Patient is a 82 year old female with a PMH of diffuse large B cell lymphoma in remission, non-hodgkin's lymphoma (chemoport), depression, HLD, GERD, PE/DVT (4/2021 no longer on Eliquis), ANCA-vasculitis (20 y/a, no meds), s/p LVATS, LUIS wedge resection (2021), recent direct admit for RVATS, RUL Nodule Biopsy w/ IR marking in [facility], path favoring vasculitis, treated with Decadron, then switched to PO prednisone, went to [facility] and now sent with elevated WBC and low hemoglobin, severe weakness and lethargy. Reports chronic cough, currently nonproductive - RVP/COVID negative. Has multiple episodes of loose stools x weeks, reported recently trying marijuana for appetite and noted worsening. She was given oral vancomycin empirically for C.diff but then became constipated, s/p bowel regimen and having normal bowel movements. Patient initially being monitored off antibiotics given she was afebrile, WBC was stable and no infectious source was found. She had a fever 100.9F on 1/30 overnight with worsening leukocytosis and then 101.4F overnight 1/31 and noted with confusion and found to have sepsis due to GP bacteremia.   H/o PCN allergy with hives    Fever -resolved   - febrile x1 2/12, pt asx, no further fevers, WBC trend noted  - RVP/COVID negative, Bcx negative   - 2/12 CXR with no focal infiltrate/consolidation seen  - L chest port accessed with no s/o infection  - continue to monitor off abx other than bactrim ppx  - monitor temps/CBC    Sepsis due to gram positive bacteremia, cleared and treated    E. faecalis bacteremia - unclear source, ?GI, less likely    Staph epi bacteremia ?skin vs port source vs contamination  AMS - neurotoxicity d/t cefepime vs infectious etiology   --d/c'ed cefepime 1/31, mental status improved   - 1/30 Bcx (1 set sent) with Staph epi - MRSE -- sensitivities noted   - 1/31 Bcx (1 set sent) - aerobic bottle grew E. faecalis (amp/vanc sensitive) and MRSE  - 2/1 repeat Bcx -- Negative x2   - TTE w/o mention of vegetations  - s/p vancomycin-completed 10d course 2/10    UTI with E. 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   - CT showed s/p wedge resections in b/l upper lobes, 2 cm nodular opacity a/w staple line in RUL; multiple ill-defined b/l opacities more in RLL -unclear etiology, b/l effusions R>L   - continue on Bactrim DS 1 tablet daily for PCP ppx while on prednisone   - quantiferon indeterminate - pt on steroids which can cause indeterminate results; was negative Nov 2022  - noted to be more hypoxic on NC 4L, repeat CXR with bibasilar hazy opacities R>L and small L sided effusion   - repeat CT chest without contrast noted with Multiple b/l nodular and opacities many of which have increased in size or are new since prior study on 1/18/2023. Findings are concerning  for worsening infection although superimposed progression of vasculitis can have a similar appearance. Short interval follow-up CT scan in one month is recommended. Mild interval improvement of the R lung base patchy opacities as compared with January 18, 2023. Nodular opacity in the RUL along the suture line mildly  increased in size, an indeterminate finding. Correlation with the biopsy  pathology results is recommended. Slight improvement of the pleural effusions bilaterally. Interlobular septal thickening suggestive of mild pulmonary edema.   - remains afebrile, now on NC 3L, WBC downtrending post chemotherapy, not neutropenic, suspect findings may be more likely due to progression of vasculitis at this time then infectious though difficult to definitely say   - d/w Rheumatology - patient later reported having sore throat, check RVP/COVID, follow up HSV PCR as below     - patient has been on bactrim for PCP ppx, but will send fungitell -ordered     - ongoing discussions with family regarding vasculitis treatment and family will likely opt for treatment with rituximab, if consents then plan to give on Monday    - can monitor off antibiotics for now - if patient spikes fever or any worsening -- send Bcx x2 and start on empiric meropenem/vancomycin (will avoid cefepime, h/o PCN allergy)    Fatigue/dyspnea likely due to severe anemia  Newly diagnosed MDS s/p BMbx 1/23  H/o DLBCL, EBV+  L epistaxis, resolved   Thrombocytopenia    - Heme/Onc following - s/p chemotherapy with decitabine x5d on 2/10-2/14   - lip lesions with dried blood, (pt reports h/o cold sores in the past)- re-ordered HSV PCR (was cancelled by lab)    -- if positive, start on valacyclovir 1g IV BID if able to tolerate PO, if not then start on IV acyclovir 5mg/kg Q8h    Over the weekend Dr. Naila Rogers will be covering for our group. If you have any questions, concerns or new micro data, please reach out to them at [phone]    Yevgeniy Malave M.D.  [facility], Division of Infectious Diseases  [phone]  After 5pm on weekdays and all day on weekends - please call [phone] Patient is a 82 year old female with a PMH of diffuse large B cell lymphoma in remission, non-hodgkin's lymphoma (chemoport), depression, HLD, GERD, PE/DVT (4/2021 no longer on Eliquis), ANCA-vasculitis (20 y/a, no meds), s/p LVATS, LUIS wedge resection (2021), recent direct admit for RVATS, RUL Nodule Biopsy w/ IR marking in [facility], path favoring vasculitis, treated with Decadron, then switched to PO prednisone, went to [facility] and now sent with elevated WBC and low hemoglobin, severe weakness and lethargy. Reports chronic cough, currently nonproductive - RVP/COVID negative. Has multiple episodes of loose stools x weeks, reported recently trying marijuana for appetite and noted worsening. She was given oral vancomycin empirically for C.diff but then became constipated, s/p bowel regimen and having normal bowel movements. Patient initially being monitored off antibiotics given she was afebrile, WBC was stable and no infectious source was found. She had a fever 100.9F on 1/30 overnight with worsening leukocytosis and then 101.4F overnight 1/31 and noted with confusion and found to have sepsis due to GP bacteremia.   H/o PCN allergy with hives    Fever -resolved   - febrile x1 2/12, pt asx, no further fevers, WBC trend noted  - RVP/COVID negative, Bcx negative   - 2/12 CXR with no focal infiltrate/consolidation seen  - L chest port accessed with no s/o infection  - monitor temps/CBC    Sepsis due to gram positive bacteremia, cleared and treated    E. faecalis bacteremia - unclear source, ?GI, less likely    Staph epi bacteremia ?skin vs port source vs contamination  AMS - neurotoxicity d/t cefepime vs infectious etiology   --d/c'ed cefepime 1/31, mental status improved   - 1/30 Bcx (1 set sent) with Staph epi - MRSE -- sensitivities noted   - 1/31 Bcx (1 set sent) - aerobic bottle grew E. faecalis (amp/vanc sensitive) and MRSE  - 2/1 repeat Bcx -- Negative x2   - TTE w/o mention of vegetations  - s/p vancomycin-completed 10d course 2/10    UTI with E. 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   - CT showed s/p wedge resections in b/l upper lobes, 2 cm nodular opacity a/w staple line in RUL; multiple ill-defined b/l opacities more in RLL -unclear etiology, b/l effusions R>L   - continue on Bactrim DS 1 tablet daily for PCP ppx while on prednisone   - quantiferon indeterminate - pt on steroids which can cause indeterminate results; was negative Nov 2022  - noted to be more hypoxic on NC 4L, repeat CXR with bibasilar hazy opacities R>L and small L sided effusion   - repeat CT chest without contrast noted with Multiple b/l nodular and opacities many of which have increased in size or are new since prior study on 1/18/2023. Findings are concerning  for worsening infection although superimposed progression of vasculitis can have a similar appearance. Short interval follow-up CT scan in one month is recommended. Mild interval improvement of the R lung base patchy opacities as compared with January 18, 2023. Nodular opacity in the RUL along the suture line mildly  increased in size, an indeterminate finding. Correlation with the biopsy  pathology results is recommended. Slight improvement of the pleural effusions bilaterally. Interlobular septal thickening suggestive of mild pulmonary edema.   - remains afebrile, now on NC 3L, WBC downtrending post chemotherapy, not neutropenic, suspect findings may be more likely due to progression of vasculitis at this time then infectious though difficult to definitely say   - d/w Rheumatology - patient later reported having sore throat, check RVP/COVID, follow up HSV PCR as below     - patient has been on bactrim for PCP ppx, but will send fungitell -ordered     - ongoing discussions with family regarding vasculitis treatment and family will likely opt for treatment with rituximab, if consents then plan to give on Monday    - can monitor off antibiotics for now - if patient spikes fever or any worsening -- send Bcx x2 and start on empiric meropenem/vancomycin (will avoid cefepime, h/o PCN allergy)    Fatigue/dyspnea likely due to severe anemia  Newly diagnosed MDS s/p BMbx 1/23  H/o DLBCL, EBV+  L epistaxis, resolved   Thrombocytopenia    - Heme/Onc following - s/p chemotherapy with decitabine x5d on 2/10-2/14   - lip lesions with dried blood, (pt reports h/o cold sores in the past)- re-ordered HSV PCR (was cancelled by lab)    -- if positive, start on valacyclovir 1g IV BID if able to tolerate PO, if not then start on IV acyclovir 5mg/kg Q8h    Over the weekend Dr. Naila Rogers will be covering for our group. If you have any questions, concerns or new micro data, please reach out to them at [phone]    Yevgeniy Malave M.D.  [facility], Division of Infectious Diseases  [phone]  After 5pm on weekdays and all day on weekends - please call [phone]

## 2023-02-17 NOTE — PROGRESS NOTE ADULT - SUBJECTIVE AND OBJECTIVE BOX
SUBJECTIVE/ OVERNIGHT EVENTS:  seen earlier today  appears weak  plts and hgb low again  transfusion ordered  CT chest worsening  ID / pulm/ rheum following  Lasix IV ordered since she received numerous transfusions.  denied pain  no cp, no sob (comfortable on NC), no n/v/d. no abdominal pain.  no headache, no dizziness.       --------------------------------------------------------------------------------------------  LABS:                        6.3    28.48 )-----------( 84       ( 17 Feb 2023 12:25 )             18.4     02-17    142  |  105  |  34<H>  ----------------------------<  120<H>  3.3<L>   |  22  |  0.72    Ca    7.7<L>      17 Feb 2023 06:34  Phos  3.6     02-17  Mg     1.6     02-16    TPro  5.4<L>  /  Alb  2.9<L>  /  TBili  2.3<H>  /  DBili  x   /  AST  30  /  ALT  8<L>  /  AlkPhos  106  02-17      CAPILLARY BLOOD GLUCOSE                RADIOLOGY & ADDITIONAL TESTS:    Imaging Personally Reviewed:  [x] YES  [ ] NO    Consultant(s) Notes Reviewed:  [x] YES  [ ] NO    MEDICATIONS  (STANDING):  Biotene Dry Mouth Oral Rinse 15 milliLiter(s) Swish and Spit four times a day  budesonide 160 MICROgram(s)/formoterol 4.5 MICROgram(s) Inhaler 2 Puff(s) Inhalation two times a day  chlorhexidine 2% Cloths 1 Application(s) Topical daily  cholecalciferol 2000 Unit(s) Oral daily  citalopram 10 milliGRAM(s) Oral daily  FIRST- Mouthwash  BLM 10 milliLiter(s) Swish and Spit four times a day  fluticasone propionate 50 MICROgram(s)/spray Nasal Spray 1 Spray(s) Both Nostrils two times a day  folic acid 1 milliGRAM(s) Oral daily  furosemide   Injectable 20 milliGRAM(s) IV Push daily  guaiFENesin  milliGRAM(s) Oral every 12 hours  influenza  Vaccine (HIGH DOSE) 0.7 milliLiter(s) IntraMuscular once  ipratropium    for Nebulization 500 MICROGram(s) Nebulizer every 6 hours  lactobacillus acidophilus 1 Tablet(s) Oral daily  metoprolol tartrate 25 milliGRAM(s) Oral two times a day  mirtazapine 7.5 milliGRAM(s) Oral daily  pantoprazole    Tablet 40 milliGRAM(s) Oral before breakfast  predniSONE   Tablet 20 milliGRAM(s) Oral daily  senna 1 Tablet(s) Oral daily  sodium bicarbonate 650 milliGRAM(s) Oral three times a day  sodium chloride 0.65% Nasal 2 Spray(s) Both Nostrils every 6 hours  trimethoprim  160 mG/sulfamethoxazole 800 mG 1 Tablet(s) Oral daily    MEDICATIONS  (PRN):  acetaminophen     Tablet .. 650 milliGRAM(s) Oral every 6 hours PRN Temp greater or equal to 38C (100.4F), Mild Pain (1 - 3)  aluminum hydroxide/magnesium hydroxide/simethicone Suspension 30 milliLiter(s) Oral every 4 hours PRN Dyspepsia  benzocaine/menthol Lozenge 1 Lozenge Oral every 8 hours PRN Sore Throat  melatonin 3 milliGRAM(s) Oral at bedtime PRN Insomnia  ondansetron Injectable 4 milliGRAM(s) IV Push every 8 hours PRN Nausea and/or Vomiting  polyethylene glycol 3350 17 Gram(s) Oral daily PRN Constipation      Care Discussed with Consultants/Other Providers [x] YES  [ ] NO    Vital Signs Last 24 Hrs  T(C): 36.7 (17 Feb 2023 17:00), Max: 36.7 (17 Feb 2023 05:43)  T(F): 98 (17 Feb 2023 17:00), Max: 98.1 (17 Feb 2023 05:43)  HR: 104 (17 Feb 2023 17:00) (102 - 112)  BP: 137/79 (17 Feb 2023 17:00) (93/57 - 139/83)  BP(mean): 84 (16 Feb 2023 18:57) (84 - 84)  RR: 18 (17 Feb 2023 17:00) (16 - 20)  SpO2: 95% (17 Feb 2023 17:00) (94% - 100%)    Parameters below as of 17 Feb 2023 17:00  Patient On (Oxygen Delivery Method): nasal cannula  O2 Flow (L/min): 3    I&O's Summary    16 Feb 2023 07:01  -  17 Feb 2023 07:00  --------------------------------------------------------  IN: 530 mL / OUT: 200 mL / NET: 330 mL    17 Feb 2023 07:01  -  17 Feb 2023 17:49  --------------------------------------------------------  IN: 480 mL / OUT: 0 mL / NET: 480 mL        PHYSICAL EXAM:  GENERAL: NAD, thin pale elderly, fatigue, comfortable on nasal canula, lip lesion, dry blood  HEAD:  Atraumatic, Normocephalic  EYES: EOMI, PERRLA, conjunctiva and sclera clear  NECK: Supple, No JVD  CHEST/LUNG: mild decrease breath sounds bilaterally; No wheeze   HEART: Regular rate and rhythm; No murmurs, rubs, or gallops  ABDOMEN: Soft, Nontender, Nondistended; Bowel sounds present  Neuro: awake alert, back to baseline mental status. no focal weakness  EXTREMITIES:  2+ Peripheral Pulses, No clubbing, cyanosis, trace b/l edema  SKIN: superficial ecchymoses.  lower lip hematoma/lip dry blood, no active bleeding

## 2023-02-17 NOTE — PROGRESS NOTE ADULT - SUBJECTIVE AND OBJECTIVE BOX
CARDIOLOGY FOLLOW UP - Dr. Hooker  DATE OF SERVICE: 2/17/23    CC  No CV complaints    REVIEW OF SYSTEMS:  CONSTITUTIONAL: No fever, weight loss, or fatigue  RESPIRATORY: No cough, wheezing, chills or hemoptysis; No Shortness of Breath  CARDIOVASCULAR: No chest pain, palpitations, passing out, dizziness, or leg swelling  GASTROINTESTINAL: No abdominal or epigastric pain. No nausea, vomiting, or hematemesis; No diarrhea or constipation. No melena or hematochezia.  VASCULAR: No edema     PHYSICAL EXAM:  T(C): 36.2 (02-17-23 @ 13:55), Max: 36.7 (02-17-23 @ 05:43)  HR: 106 (02-17-23 @ 13:55) (102 - 112)  BP: 138/81 (02-17-23 @ 13:55) (93/57 - 138/81)  RR: 18 (02-17-23 @ 13:55) (16 - 20)  SpO2: 100% (02-17-23 @ 13:55) (97% - 100%)  Wt(kg): --  I&O's Summary    16 Feb 2023 07:01  -  17 Feb 2023 07:00  --------------------------------------------------------  IN: 530 mL / OUT: 200 mL / NET: 330 mL    17 Feb 2023 07:01  -  17 Feb 2023 15:54  --------------------------------------------------------  IN: 480 mL / OUT: 0 mL / NET: 480 mL        Appearance: Normal	  Cardiovascular: Normal S1 S2,RRR, No JVD, No murmurs  Respiratory: Lungs clear to auscultation b/l  Gastrointestinal:  Soft, Non-tender, + BS	  Extremities: Normal range of motion, No clubbing, cyanosis or edema      Home Medications:  acetaminophen 325 mg oral tablet: 2 tab(s) orally every 6 hours, As needed, Mild Pain (1 - 3) (19 Jan 2023 09:26)  Atrovent HFA 17 mcg/inh inhalation aerosol: 2 puff(s) inhaled every 6 hours, As Needed (19 Jan 2023 09:26)  citalopram 10 mg oral tablet: 1 tab(s) orally once a day (19 Jan 2023 09:26)  enoxaparin: 40 milligram(s) subcutaneous once a day (19 Jan 2023 09:26)  ezetimibe 10 mg oral tablet: 1 tab(s) orally once a day (19 Jan 2023 09:26)  folic acid 1 mg oral tablet: 1 tab(s) orally once a day (19 Jan 2023 09:26)  guaiFENesin 600 mg oral tablet, extended release: 1 tab(s) orally every 12 hours (19 Jan 2023 09:26)  loperamide 2 mg oral capsule: 1 cap(s) orally every 4 hours for 48hrs, As Needed (19 Jan 2023 09:26)  melatonin 3 mg oral tablet: 1 tab(s) orally once a day (at bedtime) (19 Jan 2023 09:26)  metoprolol tartrate 25 mg oral tablet: 1 tab(s) orally 2 times a day (19 Jan 2023 09:26)  mirtazapine 7.5 mg oral tablet: 1 tab(s) orally once a day (at bedtime) (19 Jan 2023 09:26)  pantoprazole 40 mg oral delayed release tablet: 1 tab(s) orally once a day (19 Jan 2023 09:26)  polyethylene glycol 3350 oral powder for reconstitution: 17 gram(s) orally once a day (19 Jan 2023 09:26)  predniSONE 20 mg oral tablet: 1 tab(s) orally once a day (19 Jan 2023 09:26)  Robitussin Cough + Chest Congestion DM: 10 milliliter(s) orally 8 times a day, As Needed (19 Jan 2023 09:26)  sodium bicarbonate 650 mg oral tablet: 1 tab(s) orally 2 times a day (19 Jan 2023 09:26)  vancomycin 250 mg/5 mL oral liquid: 5 milliliter(s) orally every 6 hours for 7 days (19 Jan 2023 09:26)  Xopenex HFA 45 mcg/inh inhalation aerosol: 2 puff(s) inhaled every 6 hours (19 Jan 2023 09:26)      MEDICATIONS  (STANDING):  Biotene Dry Mouth Oral Rinse 15 milliLiter(s) Swish and Spit four times a day  budesonide 160 MICROgram(s)/formoterol 4.5 MICROgram(s) Inhaler 2 Puff(s) Inhalation two times a day  chlorhexidine 2% Cloths 1 Application(s) Topical daily  cholecalciferol 2000 Unit(s) Oral daily  citalopram 10 milliGRAM(s) Oral daily  FIRST- Mouthwash  BLM 10 milliLiter(s) Swish and Spit four times a day  fluticasone propionate 50 MICROgram(s)/spray Nasal Spray 1 Spray(s) Both Nostrils two times a day  folic acid 1 milliGRAM(s) Oral daily  guaiFENesin  milliGRAM(s) Oral every 12 hours  influenza  Vaccine (HIGH DOSE) 0.7 milliLiter(s) IntraMuscular once  ipratropium    for Nebulization 500 MICROGram(s) Nebulizer every 6 hours  lactobacillus acidophilus 1 Tablet(s) Oral daily  metoprolol tartrate 25 milliGRAM(s) Oral two times a day  mirtazapine 7.5 milliGRAM(s) Oral daily  pantoprazole    Tablet 40 milliGRAM(s) Oral before breakfast  predniSONE   Tablet 20 milliGRAM(s) Oral daily  senna 1 Tablet(s) Oral daily  sodium bicarbonate 650 milliGRAM(s) Oral three times a day  sodium chloride 0.65% Nasal 2 Spray(s) Both Nostrils every 6 hours  trimethoprim  160 mG/sulfamethoxazole 800 mG 1 Tablet(s) Oral daily      TELEMETRY: 	    ECG:  	  RADIOLOGY:   < from: Xray Chest 1 View- PORTABLE-Urgent (Xray Chest 1 View- PORTABLE-Urgent .) (02.16.23 @ 13:59) >  IMPRESSION:  Bibasilar hazy opacities, right worse than left, and small left sided   pleural effusion.    < end of copied text >    DIAGNOSTIC TESTING:  [ ] Echocardiogram:  [ ]  Catheterization:  [ ] Stress Test:    OTHER: 	    LABS:	 	                            6.3    28.48 )-----------( 84       ( 17 Feb 2023 12:25 )             18.4     02-17    142  |  105  |  34<H>  ----------------------------<  120<H>  3.3<L>   |  22  |  0.72    Ca    7.7<L>      17 Feb 2023 06:34  Phos  3.6     02-17  Mg     1.6     02-16    TPro  5.4<L>  /  Alb  2.9<L>  /  TBili  2.3<H>  /  DBili  x   /  AST  30  /  ALT  8<L>  /  AlkPhos  106  02-17

## 2023-02-17 NOTE — PROGRESS NOTE ADULT - ASSESSMENT
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 Patient was sent to Rehab. Subsequently on 1/18 patient is readmitted for general weakness w/hb 5. Rheumatology was consulted for further help in management.    #AAV-MPO  -Patient w/hx of mononeuritis multiplex, kidney  and lung involvement,  -Previous treatment with RTX, transitioned to AZA and off any therapy while receiving R-CHOP   -CT chest IN 11/2022 Increased mediastinal lymphadenopathy. A reference low right paratracheal node measures 2.4 x 1.8 cm (2, 39), interval increase in size from prior when it measured 2.0 x 1.2 cm. -11/10/22 R wedge resection showed capillaritis with fibrin, giant cells and inflammation around small and intermediate sized vessels. Elastic stain showed vasculocentricity of the inflammation. 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  -Repeat CT Chest today with multiple b/l nodular opacities that have increased in size    #MDS with excess blasts   - completed first round with Dacogen  -  today Hgb 6.3; T bili 2.3      Plan  -Patient  on 3L of ncO2; monitor respiratory status  -c/w 20 mg prednisone daily  -transfusions per Hematology  -check T bili, D bili, Haptoglobin and LDH tomorrow (ordered)  -Hematology recs appreciated: no contraindications to Rituximab  -will discuss with patient and family again regarding Rituximab treatment  -c/w PCP prophylaxis and PPI ppx     Prognosis guarded    To be discussed with Dr. 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 Patient was sent to Rehab. Subsequently on 1/18 patient is readmitted for general weakness w/hb 5. Rheumatology was consulted for further help in management.    #AAV-MPO  -Patient w/hx of mononeuritis multiplex, kidney  and lung involvement,  -Previous treatment with RTX, transitioned to AZA and off any therapy while receiving R-CHOP   -CT chest IN 11/2022 Increased mediastinal lymphadenopathy. A reference low right paratracheal node measures 2.4 x 1.8 cm (2, 39), interval increase in size from prior when it measured 2.0 x 1.2 cm. -11/10/22 R wedge resection showed capillaritis with fibrin, giant cells and inflammation around small and intermediate sized vessels. Elastic stain showed vasculocentricity of the inflammation. 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  -Repeat CT Chest today with multiple b/l nodular opacities that have increased in size    #MDS with excess blasts   - completed first round with Dacogen  -  today Hgb 6.3; T bili 2.3    Plan  -Please send Respiratory Viral Panel due to sore throat  -ID recs appreciated; f/u HSV pcr  -Patient on 3L of nc O2; monitor respiratory status  -c/w 20 mg prednisone daily  -transfusions per Hematology  -check T bili, D bili, Haptoglobin and LDH tomorrow (ordered)  -Hematology recs appreciated: no contraindications to Rituximab  -Patient and family to decide tomorrow on pursing Rituximab treatment for Vasculitis; risks and benefits explained at length  -if per patient's wishes; will plan to proceed with Rituximab 500 mg IV on Monday  -c/w PCP prophylaxis and PPI ppx     Prognosis guarded    Discussed with Medicine, ID and Dr. Aguila Barron, Attending    Jesus Ritter MD  Rheumatology Fellow, PGY-5  Available on TEAMS

## 2023-02-17 NOTE — PROGRESS NOTE ADULT - NSPROGADDITIONALINFOA_GEN_ALL_CORE
Still requiring frequent transfusions.  close monitoring.  overall critically ill.  CT chest reviewed.    - Dr. MARY Arias ([facility])  - [phone]

## 2023-02-17 NOTE — PROGRESS NOTE ADULT - SUBJECTIVE AND OBJECTIVE BOX
INTERVAL HPI/OVERNIGHT EVENTS:  Patient lying in bed.  Endorses fatigue, weakness, difficult to speak. Reports that her throat hurts when she coughs.     MEDICATIONS  (STANDING):  Biotene Dry Mouth Oral Rinse 15 milliLiter(s) Swish and Spit four times a day  budesonide 160 MICROgram(s)/formoterol 4.5 MICROgram(s) Inhaler 2 Puff(s) Inhalation two times a day  chlorhexidine 2% Cloths 1 Application(s) Topical daily  cholecalciferol 2000 Unit(s) Oral daily  citalopram 10 milliGRAM(s) Oral daily  FIRST- Mouthwash  BLM 10 milliLiter(s) Swish and Spit four times a day  fluticasone propionate 50 MICROgram(s)/spray Nasal Spray 1 Spray(s) Both Nostrils two times a day  folic acid 1 milliGRAM(s) Oral daily  guaiFENesin  milliGRAM(s) Oral every 12 hours  influenza  Vaccine (HIGH DOSE) 0.7 milliLiter(s) IntraMuscular once  ipratropium    for Nebulization 500 MICROGram(s) Nebulizer every 6 hours  lactobacillus acidophilus 1 Tablet(s) Oral daily  metoprolol tartrate 25 milliGRAM(s) Oral two times a day  mirtazapine 7.5 milliGRAM(s) Oral daily  pantoprazole    Tablet 40 milliGRAM(s) Oral before breakfast  potassium chloride  10 mEq/100 mL IVPB 10 milliEquivalent(s) IV Intermittent every 1 hour  predniSONE   Tablet 20 milliGRAM(s) Oral daily  senna 1 Tablet(s) Oral daily  sodium bicarbonate 650 milliGRAM(s) Oral three times a day  sodium chloride 0.65% Nasal 2 Spray(s) Both Nostrils every 6 hours  trimethoprim  160 mG/sulfamethoxazole 800 mG 1 Tablet(s) Oral daily    MEDICATIONS  (PRN):  acetaminophen     Tablet .. 650 milliGRAM(s) Oral every 6 hours PRN Temp greater or equal to 38C (100.4F), Mild Pain (1 - 3)  aluminum hydroxide/magnesium hydroxide/simethicone Suspension 30 milliLiter(s) Oral every 4 hours PRN Dyspepsia  benzocaine/menthol Lozenge 1 Lozenge Oral every 8 hours PRN Sore Throat  melatonin 3 milliGRAM(s) Oral at bedtime PRN Insomnia  ondansetron Injectable 4 milliGRAM(s) IV Push every 8 hours PRN Nausea and/or Vomiting  polyethylene glycol 3350 17 Gram(s) Oral daily PRN Constipation        Vital Signs Last 24 Hrs  T(C): 36.2 (17 Feb 2023 13:55), Max: 36.7 (16 Feb 2023 14:26)  T(F): 97.2 (17 Feb 2023 13:55), Max: 98.1 (16 Feb 2023 14:26)  HR: 106 (17 Feb 2023 13:55) (102 - 130)  BP: 138/81 (17 Feb 2023 13:55) (93/57 - 138/81)  BP(mean): 84 (16 Feb 2023 18:57) (76 - 84)  RR: 18 (17 Feb 2023 13:55) (16 - 20)  SpO2: 100% (17 Feb 2023 13:55) (97% - 100%)    Parameters below as of 17 Feb 2023 13:55  Patient On (Oxygen Delivery Method): nasal cannula  O2 Flow (L/min): 3      PHYSICAL EXAMINATION:  General: No apparent distress, Cachectic   HEENT: EOMI,   CVS: +S1/S2, RRR  Resp: On 2L of ncO2. Crackles b/l  GI: Soft, NT/ND +BS  MSK: no synovitis, joints non-tender; dorsiflexion: 3/5 L  Skin: non blanching multiple purpuric lesions in the lower extremities, arms, and chest; crusted lesions around mouth      LABS:                        6.3    28.48 )-----------( 84       ( 17 Feb 2023 12:25 )             18.4     02-17    142  |  105  |  34<H>  ----------------------------<  120<H>  3.3<L>   |  22  |  0.72    Ca    7.7<L>      17 Feb 2023 06:34  Phos  3.6     02-17  Mg     1.6     02-16    TPro  5.4<L>  /  Alb  2.9<L>  /  TBili  2.3<H>  /  DBili  x   /  AST  30  /  ALT  8<L>  /  AlkPhos  106  02-17            RADIOLOGY & ADDITIONAL TESTS:  < from: CT Chest No Cont (02.17.23 @ 11:12) >  ACC: [id number] EXAM:  CT CHEST   ORDERED BY: ALICIA GEORGE     PROCEDURE DATE:  02/17/2023          INTERPRETATION:  CLINICAL INFORMATION: Follow-up multiple ill-defined   lung opacities seen on recent CT chest, as well as progression of   vasculitis. History of ANCA vasculitis and large B-cell lymphoma.    COMPARISON: CT chest abdomen pelvis 1/18/2023.    CONTRAST/COMPLICATIONS:  IV Contrast: NONE  Oral Contrast: NONE  Complications: None reported at time of study completion    PROCEDURE:  CT scan of the chest was obtained without intravenous contrast.    FINDINGS:    LYMPH NODES: Unchanged mediastinal and hilar lymphadenopathy.    HEART/VASCULATURE: Cardiomegaly. No pericardial effusion. Coronary artery   and aortic calcifications. MediPort catheter tip terminates in the right   atrium.    AIRWAYS/LUNGS/PLEURA: Patent central airways. Bilateral upper lobe wedge   resections. Previously seen nodular opacity along the right upper lobe   wedge resection suture line demonstrate mild interval increase since   prior study on 1/18/2023, currently measuring approximately 2.0 x 1.7 cm   (3-51), previously measuring 1.1 x 1.6 cm. In addition the ovoid shaped   nodular opacity containing punctate calcifications in the left upper lobe   hasalso increased in size currently measuring 1.6 x 1.1 cm (3-44),   previously 1.3 x 0.8 cm. Overall increase or progression of many of the   previously noted bilateral patchy opacities involving all lobes as   compared to prior study on 1/18/2023. Slight interval improvement of the   previously seen right lower lobe patchy opacities at the right lung base   since January 18, 2023. Mild interlobular septal thickening. Small to   moderate partially loculated right and small left pleural effusions, of   which have minimally improved with associated compressive atelectasis. No   pneumothorax. Bibasilar areas of atelectasis.    UPPER ABDOMEN: Large hiatal hernia.    BONES/SOFT TISSUES: Left chest wall MediPort. Chronic T11 vertebral body   compression deformity. Degenerative changes of the spine.    IMPRESSION:    Multiple bilateral nodular and opacities many of which have increased in   size or are new since prior study on 1/18/2023. Findings are concerning   for worsening infection although superimposed progression of vasculitis   can have a similar appearance. Short interval follow-up CT scan in one   month is recommended. Mild interval improvement of the right lung base   patchy opacities as compared with January 18, 2023.    Nodular opacity in the right upper lobe along the suture line mildly   increased in size, an indeterminate finding. Correlation with the biopsy   pathology results is recommended.    Slight improvement of the pleural effusions bilaterally. Interlobular   septalthickening suggestive of mild pulmonary edema.    --- End of Report ---    < end of copied text >

## 2023-02-17 NOTE — PROGRESS NOTE ADULT - ATTENDING COMMENTS
as above  lengthy discussion with patient's daughter Leeann regarding patient's prognosis and current status of her vasculitis, treatment with RTX ...  she is meeting with the rest of her family tomorrow and will decide but leaning towards not pursuing with RTX  Discussed with ID, to send additional work up

## 2023-02-18 NOTE — PROVIDER CONTACT NOTE (CRITICAL VALUE NOTIFICATION) - BACKGROUND
Pt had diffuse large B cell lymphoma
Pt admitted for anemia
Pt admitted for anemia. PMH DVT, Lung mass, HLD
Pt came in for anemia
Pt had UTI
Pt presents dyspnea, anemia, hx B cell lymphoma
PT.  ADMITTED FOR ANAEMIA
Pt came in for anemia.
Pt admitted for anemia
Pt admitted for anemia
patient admitted with anemia
patient admitted with anemia
Pt admitted for anemia
Pt 81 yo female admitted for sepsis and anemia

## 2023-02-18 NOTE — PROGRESS NOTE ADULT - ASSESSMENT
82 yr old female with a PMHx of diffuse large B cell lymphoma in remission, non-hodgkin's lymphoma (chemoport), depression, HLD, GERD, PE/DVT (4/2021 no longer on Eliquis), ANCA-vasculitis (22 y/a, no meds), s/p LVATS, LUIS wedge resection (2021), recent direct admit for RVATS, RUL Nodule Biopsy w/ IR marking in [facility], path favoring vasculitis, treated with Decadron, then switched to PO prednisone, went to [facility]. She presented here from [facility] for elevated WBC and low hemoglobin, severe weakness. Pt states for the past 2-3 days has been having increased weakness and lethargy, decreased appetite. +sputum productive cough. + cui, no sob, no difficulty breathing. No abdominal pain, nausea, vomiting, no bloody stools. Has endorsed multiple episodes of loose stools x weeks, currently being treated for c.diff with oral vancomycin. Nephrology consulted for Hyponatremia.       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

## 2023-02-18 NOTE — PROGRESS NOTE ADULT - NSPROGADDITIONALINFOA_GEN_ALL_CORE
Still requiring frequent transfusions.  close monitoring.  overall critically ill.  CT chest reviewed.    - Dr. MARY Arias ([facility])  - [phone]

## 2023-02-18 NOTE — PROVIDER CONTACT NOTE (CRITICAL VALUE NOTIFICATION) - TEST AND RESULT REPORTED:
HSV1 POSITIVE
WBC 47.65; hgb: 5.9; Hct: 17.3
Blood culture
WBC 49.81
WBC 70 H&H 7, 28.0 PLT 11
WCT 73.31
Glucose
Platelet 13
WBC 45.24, Plt 14
WBC 62.29
Potassium 2.9  RBC 42.4  Platelets 4
blood culture
Critical Value, Platelet of 5
WBC 40.74
WBC 66.9  H&H 6.4/19.7  PLT 7
0715 PLT 11  1804 PLT
WBC 54.66  VANCO 29.2
Platelet 8
WBC 57.56 PLT 12
WBC 73.4, Hemoglobin 5.9 HCT 18
Wct 49.53  Plt 10
urine culture from 2/1/2023: Enterococcus Faecium (vancomycin resistant); 2nd urine culture from 2/1/2023: Enterococcus Faecium (vancomycin resistant)
Platelet 8 WBC 53.70
WBC 48.45
WBC, Hgb, Hct, and platelet
yellow

## 2023-02-18 NOTE — PROGRESS NOTE ADULT - ASSESSMENT
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 She presented here from [facility] for elevated WBC and low hemoglobin, severe weakness. Pt states for the past 2-3 days has been having increased weakness and lethargy, decreased appetite. +sputum productive cough. + cui, no sob, no difficulty breathing. No abdominal pain, nausea, vomiting, no bloody stools. Has endorsed multiple episodes of loose stools x weeks, currently being treated for c.diff with oral vancomycin.     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 no BRBPR. occult stool neg.   - she tends to require IV Lasix intermittently post transfusion.  - doubt GI bleed   - Physical therapy. Out of bed to chair with assistance.     failure to thrive  - appears weak, fatigue  requiring numerous plts and prbc transfusions  CT chest worsening  ID / pulm/ rheum following  Lasix IV ordered since she received numerous transfusions.  acyclovir started for HSV1    Diarrhea, unspecified: resolved.   - elevated WBC  - no documented c.diff PCR, re-ordered.  - s/p Vanco PO a few days (started in rehab)  - diarrhea completely resolved.   - monitor off PO vanco per ID  - now constipated. PRN bowel regimen started. +BM    Fever, resolved.   - RVP 1/30/23 (-)  - monitor blood cxs 1/30/23  - started empirically on cefepime 1/30/23, switched to merrem 1/31/23 --> 2/3/23  - enterrocus bacteremia, abx per ID. on IV Vanco, last day was 2/10/23  - Macrobid x 5 days course added for VRE in urine, last day was 2/8/23  - blood cultures now negative     AMS  - unclear etiology, likely metabolic encephalopathy from ?Cefepime? received 3 doses, last dose 1/31/23  - CT head neg. sugars stable  - close monitoring   - monitor glucose (glucose 65 on BMP, but 95 on fingersticks)  - encourage PO intake   - mental status improved    Pulmonary vasculitis   - Recent TTE on 11/3/22 reviewed: normal LV. outpt card Dr. Ady Cooper  - outpt pulm Mack Tucker   - s/p thoracoscopic biopsy of mediastinal lymph node and Lung wedge resection 11/10/22  - recent pleural effusion s/p 650 cc U/S-guided rt thoracentesis 11/17/22  - exudative but no neutrophilic predominance and initial Gram stain w/o organisms  - cultures neg.   - path results favoring vasculitis: no evidence for lymphoma. Cytology also came back negative.   - comfortable on nasal canula, better post diuretic last admission.   - rheum and heme-onc following previously, will reconsult.   - last admission, she was not started on immunosuppressants such as cellcept given recent treatment for COVID19 at that time  - c/w prednisone 20 mg qdaily.   - RTX under consideration after administration of dacogen --> acute hepatitis panel (-) and quantiferon indeterminate  - ID started PCP ppx with Bactrim.     hx of Tachycardia    - cont low-dose metoprolol, 50 mg to 25 mg dose reduced in rehab.   - card Dr. Hooker    Anxiety and depression  - stable, continue citalopram and Remeron.  - Pt denied SI/HI ideations, denied visual and auditory hallucinations.     GERD (gastroesophageal reflux disease)  - continue PPI    Hyperlipidemia.   - continue home ezetimibe. okay to hold if nonformulary     Hyponatremia 127 (hypovolemic?)  - renal on the case, resolved.   - s/p 3 days of IV lasix, now completed. electrolytes supplemented.  - O2 weaned off from ventimask to nascal canula to room air.     DVT ppx   - holding AC in the setting of anemia, pancytopenia.   - venodyne boots LEs

## 2023-02-18 NOTE — PROVIDER CONTACT NOTE (CRITICAL VALUE NOTIFICATION) - PERSON GIVING RESULT:
Galina Bruno
Johnny Trujillo
NEDA OROURKE
Maximo
Ron Rivas
Vita Moreland
Vita Moreland from Laboratory
core lab: Adán Mtz
Vita mcpherson
lab
Alexi Cooper
Radha PERRY [facility]
lab
Damion
LAB
Maximo
Venu Bruno
core lab: Binh Avery
Maximo Mixon, Lab Dept.
NING Bruno - DIANA
Tanja Puckett
Rossy
Tanja Wooten
Vita Moreland
RIGOBERTO CAPO

## 2023-02-18 NOTE — PROGRESS NOTE ADULT - SUBJECTIVE AND OBJECTIVE BOX
SUBJECTIVE/ OVERNIGHT EVENTS:  multiple transfusion  feels cold, blankets given  denied pain  no cp, no sob, no n/v/d. no abdominal pain.  no headache, no dizziness.   acyclovir started for HSV1      --------------------------------------------------------------------------------------------  LABS:                        8.3    22.30 )-----------( 79       ( 18 Feb 2023 10:18 )             23.7     02-18    144  |  106  |  33<H>  ----------------------------<  107<H>  3.1<L>   |  23  |  0.58    Ca    7.7<L>      18 Feb 2023 07:06  Phos  2.8     02-18  Mg     1.6     02-18    TPro  5.3<L>  /  Alb  2.9<L>  /  TBili  2.0<H>  /  DBili  0.9<H>  /  AST  27  /  ALT  5<L>  /  AlkPhos  93  02-18      CAPILLARY BLOOD GLUCOSE                RADIOLOGY & ADDITIONAL TESTS:    Imaging Personally Reviewed:  [x] YES  [ ] NO    Consultant(s) Notes Reviewed:  [x] YES  [ ] NO    MEDICATIONS  (STANDING):  acyclovir IVPB 260 milliGRAM(s) IV Intermittent every 8 hours  Biotene Dry Mouth Oral Rinse 15 milliLiter(s) Swish and Spit four times a day  budesonide 160 MICROgram(s)/formoterol 4.5 MICROgram(s) Inhaler 2 Puff(s) Inhalation two times a day  chlorhexidine 2% Cloths 1 Application(s) Topical daily  cholecalciferol 2000 Unit(s) Oral daily  citalopram 10 milliGRAM(s) Oral daily  FIRST- Mouthwash  BLM 10 milliLiter(s) Swish and Spit four times a day  fluticasone propionate 50 MICROgram(s)/spray Nasal Spray 1 Spray(s) Both Nostrils two times a day  folic acid 1 milliGRAM(s) Oral daily  furosemide   Injectable 20 milliGRAM(s) IV Push daily  guaiFENesin  milliGRAM(s) Oral every 12 hours  influenza  Vaccine (HIGH DOSE) 0.7 milliLiter(s) IntraMuscular once  ipratropium    for Nebulization 500 MICROGram(s) Nebulizer every 6 hours  lactobacillus acidophilus 1 Tablet(s) Oral daily  metoprolol tartrate 25 milliGRAM(s) Oral two times a day  mirtazapine 7.5 milliGRAM(s) Oral daily  pantoprazole    Tablet 40 milliGRAM(s) Oral before breakfast  predniSONE   Tablet 20 milliGRAM(s) Oral daily  senna 1 Tablet(s) Oral daily  sodium chloride 0.65% Nasal 2 Spray(s) Both Nostrils every 6 hours  trimethoprim  160 mG/sulfamethoxazole 800 mG 1 Tablet(s) Oral daily    MEDICATIONS  (PRN):  acetaminophen     Tablet .. 650 milliGRAM(s) Oral every 6 hours PRN Temp greater or equal to 38C (100.4F), Mild Pain (1 - 3)  aluminum hydroxide/magnesium hydroxide/simethicone Suspension 30 milliLiter(s) Oral every 4 hours PRN Dyspepsia  benzocaine/menthol Lozenge 1 Lozenge Oral every 8 hours PRN Sore Throat  melatonin 3 milliGRAM(s) Oral at bedtime PRN Insomnia  ondansetron Injectable 4 milliGRAM(s) IV Push every 8 hours PRN Nausea and/or Vomiting  polyethylene glycol 3350 17 Gram(s) Oral daily PRN Constipation      Care Discussed with Consultants/Other Providers [x] YES  [ ] NO    Vital Signs Last 24 Hrs  T(C): 36.5 (18 Feb 2023 09:10), Max: 36.5 (18 Feb 2023 09:10)  T(F): 97.7 (18 Feb 2023 09:10), Max: 97.7 (18 Feb 2023 09:10)  HR: 97 (18 Feb 2023 09:10) (92 - 97)  BP: 123/77 (18 Feb 2023 09:10) (120/73 - 123/77)  BP(mean): --  RR: 18 (18 Feb 2023 09:10) (18 - 18)  SpO2: 99% (18 Feb 2023 09:10) (95% - 99%)    Parameters below as of 18 Feb 2023 09:10  Patient On (Oxygen Delivery Method): nasal cannula  O2 Flow (L/min): 2    I&O's Summary    18 Feb 2023 07:01  -  19 Feb 2023 07:00  --------------------------------------------------------  IN: 0 mL / OUT: 50 mL / NET: -50 mL                PHYSICAL EXAM:  GENERAL: NAD, thin pale elderly, fatigue, comfortable on nasal canula, lip lesion, dry blood  HEAD:  Atraumatic, Normocephalic  EYES: EOMI, PERRLA, conjunctiva and sclera clear  NECK: Supple, No JVD  CHEST/LUNG: mild decrease breath sounds bilaterally; No wheeze   HEART: Regular rate and rhythm; No murmurs, rubs, or gallops  ABDOMEN: Soft, Nontender, Nondistended; Bowel sounds present  Neuro: awake alert, back to baseline mental status. no focal weakness  EXTREMITIES:  2+ Peripheral Pulses, No clubbing, cyanosis, trace b/l edema  SKIN: superficial ecchymoses.  lower lip hematoma/lip dry blood, no active bleeding

## 2023-02-18 NOTE — PROGRESS NOTE ADULT - SUBJECTIVE AND OBJECTIVE BOX
CARDIOLOGY FOLLOW UP NOTE - DR. CRUZ    Patient Name: BETTIE NGUYEN  Date of Service: 02-18-23 @ 12:34    Patient seen and examined  no inc sob    Subjective:    cv: denies chest pain, +dyspnea, palpitations, dizziness  pulmonary: denies cough  GI: denies abdominal pain, nausea, vomiting  vascular/legs: no edema   skin: no rash  ROS: otherwise negative   overnight events:      PHYSICAL EXAM:  T(C): 36.5 (02-18-23 @ 09:10), Max: 36.7 (02-17-23 @ 17:00)  HR: 97 (02-18-23 @ 09:10) (92 - 109)  BP: 123/77 (02-18-23 @ 09:10) (119/76 - 146/77)  RR: 18 (02-18-23 @ 09:10) (16 - 18)  SpO2: 99% (02-18-23 @ 09:10) (93% - 100%)  Wt(kg): --  I&O's Summary    17 Feb 2023 07:01  -  18 Feb 2023 07:00  --------------------------------------------------------  IN: 480 mL / OUT: 800 mL / NET: -320 mL      Daily     Daily     Appearance: Normal	  Cardiovascular: Normal S1 S2,RRR, No JVD, No murmurs  Respiratory: Lungs clear to auscultation	  Gastrointestinal:  Soft, Non-tender, + BS	  Extremities: Normal range of motion, No clubbing, cyanosis or edema      Home Medications:  acetaminophen 325 mg oral tablet: 2 tab(s) orally every 6 hours, As needed, Mild Pain (1 - 3) (19 Jan 2023 09:26)  Atrovent HFA 17 mcg/inh inhalation aerosol: 2 puff(s) inhaled every 6 hours, As Needed (19 Jan 2023 09:26)  citalopram 10 mg oral tablet: 1 tab(s) orally once a day (19 Jan 2023 09:26)  enoxaparin: 40 milligram(s) subcutaneous once a day (19 Jan 2023 09:26)  ezetimibe 10 mg oral tablet: 1 tab(s) orally once a day (19 Jan 2023 09:26)  folic acid 1 mg oral tablet: 1 tab(s) orally once a day (19 Jan 2023 09:26)  guaiFENesin 600 mg oral tablet, extended release: 1 tab(s) orally every 12 hours (19 Jan 2023 09:26)  loperamide 2 mg oral capsule: 1 cap(s) orally every 4 hours for 48hrs, As Needed (19 Jan 2023 09:26)  melatonin 3 mg oral tablet: 1 tab(s) orally once a day (at bedtime) (19 Jan 2023 09:26)  metoprolol tartrate 25 mg oral tablet: 1 tab(s) orally 2 times a day (19 Jan 2023 09:26)  mirtazapine 7.5 mg oral tablet: 1 tab(s) orally once a day (at bedtime) (19 Jan 2023 09:26)  pantoprazole 40 mg oral delayed release tablet: 1 tab(s) orally once a day (19 Jan 2023 09:26)  polyethylene glycol 3350 oral powder for reconstitution: 17 gram(s) orally once a day (19 Jan 2023 09:26)  predniSONE 20 mg oral tablet: 1 tab(s) orally once a day (19 Jan 2023 09:26)  Robitussin Cough + Chest Congestion DM: 10 milliliter(s) orally 8 times a day, As Needed (19 Jan 2023 09:26)  sodium bicarbonate 650 mg oral tablet: 1 tab(s) orally 2 times a day (19 Jan 2023 09:26)  vancomycin 250 mg/5 mL oral liquid: 5 milliliter(s) orally every 6 hours for 7 days (19 Jan 2023 09:26)  Xopenex HFA 45 mcg/inh inhalation aerosol: 2 puff(s) inhaled every 6 hours (19 Jan 2023 09:26)      MEDICATIONS  (STANDING):  acyclovir IVPB 260 milliGRAM(s) IV Intermittent every 8 hours  Biotene Dry Mouth Oral Rinse 15 milliLiter(s) Swish and Spit four times a day  budesonide 160 MICROgram(s)/formoterol 4.5 MICROgram(s) Inhaler 2 Puff(s) Inhalation two times a day  chlorhexidine 2% Cloths 1 Application(s) Topical daily  cholecalciferol 2000 Unit(s) Oral daily  citalopram 10 milliGRAM(s) Oral daily  FIRST- Mouthwash  BLM 10 milliLiter(s) Swish and Spit four times a day  fluticasone propionate 50 MICROgram(s)/spray Nasal Spray 1 Spray(s) Both Nostrils two times a day  folic acid 1 milliGRAM(s) Oral daily  furosemide   Injectable 20 milliGRAM(s) IV Push daily  guaiFENesin  milliGRAM(s) Oral every 12 hours  influenza  Vaccine (HIGH DOSE) 0.7 milliLiter(s) IntraMuscular once  ipratropium    for Nebulization 500 MICROGram(s) Nebulizer every 6 hours  lactobacillus acidophilus 1 Tablet(s) Oral daily  magnesium sulfate  IVPB 2 Gram(s) IV Intermittent once  metoprolol tartrate 25 milliGRAM(s) Oral two times a day  mirtazapine 7.5 milliGRAM(s) Oral daily  pantoprazole    Tablet 40 milliGRAM(s) Oral before breakfast  potassium chloride  10 mEq/100 mL IVPB 10 milliEquivalent(s) IV Intermittent every 1 hour  predniSONE   Tablet 20 milliGRAM(s) Oral daily  senna 1 Tablet(s) Oral daily  sodium chloride 0.65% Nasal 2 Spray(s) Both Nostrils every 6 hours  trimethoprim  160 mG/sulfamethoxazole 800 mG 1 Tablet(s) Oral daily      TELEMETRY: 	    ECG:  	  RADIOLOGY:   DIAGNOSTIC TESTING:  [ ] Echocardiogram:  [ ] Catheterization:  [ ] Stress Test:    OTHER: 	    LABS:	 	    CARDIAC MARKERS:                                      8.3    22.30 )-----------( 79       ( 18 Feb 2023 10:18 )             23.7     02-18    144  |  106  |  33<H>  ----------------------------<  107<H>  3.1<L>   |  23  |  0.58    Ca    7.7<L>      18 Feb 2023 07:06  Phos  2.8     02-18  Mg     1.6     02-18    TPro  5.3<L>  /  Alb  2.9<L>  /  TBili  2.0<H>  /  DBili  0.9<H>  /  AST  27  /  ALT  5<L>  /  AlkPhos  93  02-18    proBNP:     Lipid Profile:   HgA1c:     Creatinine, Serum: 0.58 mg/dL (02-18-23 @ 07:06)  Creatinine, Serum: 0.72 mg/dL (02-17-23 @ 06:34)  Creatinine, Serum: 0.53 mg/dL (02-16-23 @ 04:09)

## 2023-02-18 NOTE — PROGRESS NOTE ADULT - SUBJECTIVE AND OBJECTIVE BOX
Follow-up Pulm Progress Note    No new respiratory events overnight. Denies SOB/CP.     Medications:  MEDICATIONS  (STANDING):  acyclovir IVPB 260 milliGRAM(s) IV Intermittent every 8 hours  Biotene Dry Mouth Oral Rinse 15 milliLiter(s) Swish and Spit four times a day  budesonide 160 MICROgram(s)/formoterol 4.5 MICROgram(s) Inhaler 2 Puff(s) Inhalation two times a day  chlorhexidine 2% Cloths 1 Application(s) Topical daily  cholecalciferol 2000 Unit(s) Oral daily  citalopram 10 milliGRAM(s) Oral daily  FIRST- Mouthwash  BLM 10 milliLiter(s) Swish and Spit four times a day  fluticasone propionate 50 MICROgram(s)/spray Nasal Spray 1 Spray(s) Both Nostrils two times a day  folic acid 1 milliGRAM(s) Oral daily  furosemide   Injectable 20 milliGRAM(s) IV Push daily  guaiFENesin  milliGRAM(s) Oral every 12 hours  influenza  Vaccine (HIGH DOSE) 0.7 milliLiter(s) IntraMuscular once  ipratropium    for Nebulization 500 MICROGram(s) Nebulizer every 6 hours  lactobacillus acidophilus 1 Tablet(s) Oral daily  metoprolol tartrate 25 milliGRAM(s) Oral two times a day  mirtazapine 7.5 milliGRAM(s) Oral daily  pantoprazole    Tablet 40 milliGRAM(s) Oral before breakfast  predniSONE   Tablet 20 milliGRAM(s) Oral daily  senna 1 Tablet(s) Oral daily  sodium chloride 0.65% Nasal 2 Spray(s) Both Nostrils every 6 hours  trimethoprim  160 mG/sulfamethoxazole 800 mG 1 Tablet(s) Oral daily    MEDICATIONS  (PRN):  acetaminophen     Tablet .. 650 milliGRAM(s) Oral every 6 hours PRN Temp greater or equal to 38C (100.4F), Mild Pain (1 - 3)  aluminum hydroxide/magnesium hydroxide/simethicone Suspension 30 milliLiter(s) Oral every 4 hours PRN Dyspepsia  benzocaine/menthol Lozenge 1 Lozenge Oral every 8 hours PRN Sore Throat  melatonin 3 milliGRAM(s) Oral at bedtime PRN Insomnia  ondansetron Injectable 4 milliGRAM(s) IV Push every 8 hours PRN Nausea and/or Vomiting  polyethylene glycol 3350 17 Gram(s) Oral daily PRN Constipation    Vital Signs Last 24 Hrs  T(C): 36.5 (18 Feb 2023 09:10), Max: 36.7 (17 Feb 2023 17:00)  T(F): 97.7 (18 Feb 2023 09:10), Max: 98 (17 Feb 2023 17:00)  HR: 97 (18 Feb 2023 09:10) (92 - 109)  BP: 123/77 (18 Feb 2023 09:10) (119/76 - 146/77)  BP(mean): --  RR: 18 (18 Feb 2023 09:10) (16 - 18)  SpO2: 99% (18 Feb 2023 09:10) (93% - 100%)    Parameters below as of 18 Feb 2023 09:10  Patient On (Oxygen Delivery Method): nasal cannula  O2 Flow (L/min): 2      ABG - ( 16 Feb 2023 17:49 )  pH, Arterial: 7.44  pH, Blood: x     /  pCO2: 28    /  pO2: 74    / HCO3: 19    / Base Excess: -3.8  /  SaO2: 97.5      02-17 @ 07:01  -  02-18 @ 07:00  --------------------------------------------------------  IN: 480 mL / OUT: 800 mL / NET: -320 mL          LABS:                        8.3    22.30 )-----------( 79       ( 18 Feb 2023 10:18 )             23.7     02-18    144  |  106  |  33<H>  ----------------------------<  107<H>  3.1<L>   |  23  |  0.58    Ca    7.7<L>      18 Feb 2023 07:06  Phos  2.8     02-18  Mg     1.6     02-18    TPro  5.3<L>  /  Alb  2.9<L>  /  TBili  2.0<H>  /  DBili  0.9<H>  /  AST  27  /  ALT  5<L>  /  AlkPhos  93  02-18      CAPILLARY BLOOD GLUCOSE    CULTURES: (if applicable)    Culture - Blood (collected 02-12-23 @ 17:54)  Source: .Blood Blood-Peripheral  Final Report (02-17-23 @ 22:00):    No Growth Final    Culture - Blood (collected 02-12-23 @ 17:26)  Source: .Blood Blood-Peripheral  Final Report (02-17-23 @ 22:00):    No Growth Final    Physical Examination:  PULM: Decreased BS at bases  CVS: S1, S2 heard    RADIOLOGY REVIEWED  CXR:     CT chest:    TTE:

## 2023-02-18 NOTE — PROGRESS NOTE ADULT - ASSESSMENT
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 She presented here from [facility] for elevated WBC and low hemoglobin, severe weakness. Pt states for the past 2-3 days has been having increased weakness and lethargy, decreased appetite. +sputum productive cough. + cui, no sob, no difficulty breathing. No abdominal pain, nausea, vomiting, no bloody stools. Has endorsed multiple episodes of loose stools x weeks, currently being treated for c.diff with oral vancomycin.       Fatigue/dyspnea: likely due to severe anemia :  pt s/p 1 unit PRB  Diarrhea  Pulmonary vasculitis  :  hx of Tachycardia :  Recent TTE on 11/3/22 reviewed: normal LV. outpt card Dr. Ady Cooper  Anxiety and depression  GERD (gastroesophageal reflux disease)  Hyperlipidemia.   DVT ppx     1/20:  Fatigue/dyspnea: likely due to severe anemia :  pt s/p 1 unit PRBC: hb now  > 10  : she is on 2 L of oxygen : no wheezing: no overt signs of bleeding : ct chest is abnormal report noted:  has jean-claude ill defined opacities of unclear etiology  : venous ph is mild acidotic:  no need for Bipap at this time : monitor and trend hb  Diarrhea : symptomatic rx:  workup per primary team  Pulmonary vasculitis  : per rheumatology  : had recent vats surgery : LN biopsy noted:   hx of Tachycardia :  Recent TTE on 11/3/22 reviewed: normal LV. outpt card Dr. Ady Cooper  Anxiety and depression  GERD (gastroesophageal reflux disease) : ppi   Hyperlipidemia.   recent covid  : o n last admission she was treated for covid infection:   DVT ppx   d wteam    1/22:    Fatigue/dyspnea: likely due to severe anemia :  pt s/p 1 unit PRBC: hb now  > 10  : she is on 2 L of oxygen : no wheezing: no overt signs of bleeding : ct chest is abnormal report noted:  has jean-claude ill defined opacities of unclear etiology  : venous ph is mild acidotic:  no need for Bipap at this time : monitor and trend hb: she remained stable o gracie last 1 days:  she is not on any antibtiocs at this time: RVP  and blood cultures are negative: she had ebus biopsy also before: reviewed: ID following  Diarrhea : symptomatic rx:  workup per primary team : seems to have resolved  Pulmonary vasculitis  : per rheumatology  : had recent vats surgery : LN biopsy noted:   Bicytopneia and leucocytosis: ? etiology  : for possible bone marrow biopsy on Monday    hx of Tachycardia :  Recent TTE on 11/3/22 reviewed: normal LV. outpt card Dr. Ady Cooper  Anxiety and depression  GERD (gastroesophageal reflux disease) : ppi   Hyperlipidemia.   recent covid  : on last admission she was treated for covid infection:   DVT ppx   dw team    1/23:    Fatigue/dyspnea: likely due to severe anemia : feels weak  but no bleeding noted:  on 2 L of oxygen : she needs PT OT   Diarrhea :resolved:   Pulmonary vasculitis  : per rheumatology  : had recent vats surgery : LN biopsy noted: : she never received teat ment for vasculitis except low dose steroids:  she is awaiting bone marrow biopsy prior to induction therapy with rituximab: The ct chest done on this admission does not show pneumothorax and has jean-claude effusions:  There are some new opacites in rigth lower lobe which were not therre:  clinically she does not seem to have pneumonia: ID following: could it be a prt of vasculitis  Bicytopneia and leucocytosis: ? etiology  : for possible bone marrow biopsy today  hx of Tachycardia :  Recent TTE on 11/3/22 reviewed: normal LV. outpt card Dr. Ady Cooper  Anxiety and depression  GERD (gastroesophageal reflux disease) : ppi   Hyperlipidemia.   recent covid  : on last admission she was treated for covid infection:   DVT ppx   dw team    1/24:    Fatigue/dyspnea: likely due to severe anemia : feels weak  but no bleeding noted:  on 2 L of oxygen : she needs PT OT   Diarrhea :resolved:   Pulmonary vasculitis  : per rheumatology  : had recent vats surgery : LN biopsy noted: : she never received teat ment for vasculitis except low dose steroids:  she is awaiting bone marrow biopsy prior to induction therapy with rituximab: The ct chest done on this admission does not show pneumothorax and has jean-claude effusions:  There are some new opacites in rigth lower lobe which were not therre:  clinically she does not seem to have pneumonia: ID following: could it be a prt of vasculitis  Bicytopneia and leucocytosis: BM biopsy done: await results for eventual immunosuppressives therapy:   hx of Tachycardia :  Recent TTE on 11/3/22 reviewed: normal LV. outpt card Dr. Ady Cooper  Anxiety and depression  GERD (gastroesophageal reflux disease) : ppi   Hyperlipidemia.   recent covid  : on last admission she was treated for covid infection:   DVT ppx   dw team    1/25:    Fatigue/dyspnea: likely due to severe anemia : feels weak  but no bleeding noted:  on 2 L of oxygen : she needs PT OT   Diarrhea :resolved:   Pulmonary vasculitis  : per rheumatology  : had recent vats surgery : LN biopsy noted: : she never received teat ment for vasculitis except low dose steroids:  she is awaiting bone marrow biopsy prior to induction therapy with rituximab: The ct chest done on this admission does not show pneumothorax and has jean-claude effusions:  There are some new opacites in rigth lower lobe which were not there:  clinically she does not seem to have pneumonia: ID following: could it be a part of vasculitis : her resp status has not changed   Bicytopneia and leucocytosis: BM biopsy done: await results for still pending   hx of Tachycardia :  Recent TTE on 11/3/22 reviewed: normal LV. outpt card Dr. Ady Cooper  Anxiety and depression  GERD (gastroesophageal reflux disease) : ppi   Hyperlipidemia.   recent covid  : on last admission she was treated for covid infection:   DVT ppx   dw team: awaiting decision on immunosuppression     1/26;      Fatigue/dyspnea: likely due to severe anemia : feels weak  but no bleeding noted:  on 2 L of oxygen : she needs PT OT : got it yesterday    Diarrhea :resolved:   Pulmonary vasculitis  : per rheumatology  : had recent vats surgery : LN biopsy noted: : she never received teat ment for vasculitis except low dose steroids:  she is awaiting bone marrow biopsy prior to induction therapy with rituximab: The ct chest done on this admission does not show pneumothorax and has jean-claude effusions:  There are some new opacities in rigth lower lobe which were not there:  clinically she does not seem to have pneumonia: ID following: could it be a part of vasculitis : her resp status has not changed  : being observed off antibiotics   Bicytopneia and leucocytosis: BM biopsy done: await results for still pending   hx of Tachycardia :  Recent TTE on 11/3/22 reviewed: normal LV. outpt card Dr. Ady Cooper  Anxiety and depression  GERD (gastroesophageal reflux disease) : ppi   Hyperlipidemia.   recent covid  : on last admission she was treated for covid infection:   DVT ppx   dw team: awaiting decision on immunosuppression     1/27:    Fatigue/dyspnea: likely due to severe anemia : feels weak  but no bleeding noted:  on 2 L of oxygen : cont  PT OT :   Diarrhea :resolved:   Pulmonary vasculitis  : per rheumatology  : had recent vats surgery : LN biopsy noted: : she never received teat ment for vasculitis except low dose steroids:  she is awaiting bone marrow biopsy prior to induction therapy with rituximab: The ct chest done on this admission does not show pneumothorax and has jean-claude effusions:  There are some new opacities in rigth lower lobe which were not there:  clinically she does not seem to have pneumonia: ID following: could it be a part of vasculitis : her resp status has not changed  : being observed off antibiotics :  on bactrim prophylaxis as she is on chr steroids   Bicytopneia and leucocytosis: BM biopsy done: await results for still pending   hx of Tachycardia :  Recent TTE on 11/3/22 reviewed: normal LV. outpt card Dr. Ady Cooper  Anxiety and depression  GERD (gastroesophageal reflux disease) : ppi   Hyperlipidemia.   recent covid  : on last admission she was treated for covid infection:   DVT ppx   dw team: awaiting decision on immunosuppression     1/29:    Fatigue/dyspnea: likely due to severe anemia : feels weak  but no bleeding noted:  on 2 L of oxygen : cont  PT OT :   Diarrhea :resolved:   Pulmonary vasculitis  : per rheumatology  : had recent vats surgery : LN biopsy noted: : she never received treat ment for vasculitis except low dose steroids:  she is awaiting bone marrow biopsy prior to induction therapy with rituximab: The ct chest done on this admission does not show pneumothorax and has jean-claude effusions:  There are some new opacities in rigth lower lobe which were not there:  clinically she does not seem to have pneumonia: ID following: could it be a part of vasculitis : her resp status has not changed  : being observed off antibiotics :  on bactrim prophylaxis as she is on chr steroids   Bicytopneia and leucocytosis: BM biopsy: results reviewed defer to [facility]  hx of Tachycardia :  Recent TTE on 11/3/22 reviewed: normal LV.   Anxiety and depression  GERD (gastroesophageal reflux disease) : ppi   Hyperlipidemia.   recent covid  : on last admission she was treated for covid infection:   DVT ppx   dw team: awaiting decision on immunosuppression     1/30:    Fatigue/dyspnea: likely due to severe anemia : feels weak  but no bleeding noted:  on 2 L of oxygen : cont  PT OT : sitting in chair today   Diarrhea :resolved:   Pulmonary vasculitis  : per rheumatology  : had recent vats surgery : LN biopsy noted: : she never received treat ment for vasculitis except low dose steroids:  she is awaiting bone marrow biopsy prior to induction therapy with rituximab: The ct chest done on this admission does not show pneumothorax and has jean-claude effusions:  There are some new opacities in rigth lower lobe which were not there:  clinically she does not seem to have pneumonia: ID following: could it be a part of vasculitis : her resp status has not changed  : being observed off antibiotics :  on bactrim prophylaxis as she is on chr steroids  /l however she had low grade tempt today : rvp is negative : cultures sent: ID follow up   Bicytopneia and leucocytosis: BM biopsy: results reviewed defer to [facility]  hx of Tachycardia :  Recent TTE on 11/3/22 reviewed: normal LV.   Anxiety and depression  GERD (gastroesophageal reflux disease) : ppi   Hyperlipidemia.   recent covid  : on last admission she was treated for covid infection:   DVT ppx   dw team: awaiting decision on immunosuppression     1/31:    Fatigue/dyspnea: likely due to severe anemia : feels weak  but no bleeding noted:  on 2 L of oxygen : cont  PT OT : lying in bed:   Diarrhea :resolved:   Pulmonary vasculitis  : per rheumatology  : had recent vats surgery : LN biopsy noted: : she never received treat ment for vasculitis except low dose steroids:  she is awaiting bone marrow biopsy prior to induction therapy with rituximab: The ct chest done on this admission does not show pneumothorax and has jean-claude effusions:  There are some new opacities in rigth lower lobe which were not there:  clinically she does not seem to have pneumonia: ID following: could it be a part of vasculitis : her resp status has not changed  : being observed off antibiotics :  on bactrim prophylaxis as she is on chr steroids : she seems OK:  no sob:     Bicytopneia and leucocytosis: BM biopsy: results reviewed defer to [facility] ; for eventual chemo   hx of Tachycardia :  Recent TTE on 11/3/22 reviewed: normal LV.   Anxiety and depression  GERD (gastroesophageal reflux disease) : ppi   Hyperlipidemia.   recent covid  : on last admission she was treated for covid infection:   DVT ppx   dw team: awaiting decision on immunosuppression     2/1:    Fatigue/dyspnea: likely due to severe anemia : feels weak  but no bleeding noted:  on 2 L of oxygen : cont  PT OT : lying in bed:  her blood cultures are contaminant:   Diarrhea :resolved:   Pulmonary vasculitis  : per rheumatology  : had recent vats surgery : LN biopsy noted: : she never received treat ment for vasculitis except low dose steroids:  she is awaiting bone marrow biopsy prior to induction therapy with rituximab: The ct chest done on this admission does not show pneumothorax and has jean-claude effusions:  There are some new opacities in rigth lower lobe which were not there:  clinically she does not seem to have pneumonia: ID following: could it be a part of vasculitis : her resp status has not changed  : being observed off antibiotics :  on bactrim prophylaxis as she is on chr steroids : she seems OK:  no sob:     Bicytopneia and leucocytosis: BM biopsy: results reviewed defer to [facility] ; for eventual chemo   hx of Tachycardia :  Recent TTE on 11/3/22 reviewed: normal LV.   Anxiety and depression  GERD (gastroesophageal reflux disease) : ppi   Hyperlipidemia.   recent covid  : on last admission she was treated for covid infection:   DVT ppx   dw team: awaiting decision on immunosuppression: overall her general condition seems very poor and very weak:     2/2:    Fatigue/dyspnea: likely due to severe anemia : feels weak  but no bleeding noted:  on 2 L of oxygen : cont  PT OT : lying in bed:  her blood cultures are positive E FAECALIS :  ON MEROPENEM  Diarrhea :resolved:   Pulmonary vasculitis  : per rheumatology  : had recent vats surgery : LN biopsy noted: : she never received treat ment for vasculitis except low dose steroids:  she is awaiting bone marrow biopsy prior to induction therapy with rituximab: The ct chest done on this admission does not show pneumothorax and has jean-claude effusions:  There are some new opacities in rigth lower lobe which were not there:  clinically she does not seem to have pneumonia: ID following: could it be a part of vasculitis : her resp status has not changed  : being observed off antibiotics :  on bactrim prophylaxis as she is on chr steroids : she seems OK:  no sob:     Bicytopneia and leucocytosis: BM biopsy: results reviewed defer to [facility] ; for eventual chemo   hx of Tachycardia :  Recent TTE on 11/3/22 reviewed: normal LV.   Anxiety and depression  GERD (gastroesophageal reflux disease) : ppi   Hyperlipidemia.   recent covid  : on last admission she was treated for covid infection:   DVT ppx   dw team: awaiting decision on immunosuppression: overall her general condition seems very poor and very weak: ON ANTIBIOTICS     2/3   Pulmonary Vasculitis  -Steroids per rheum  -Plan for eventual Rituxan  -Bactrim for PCP ppx  MDS  -Per heme/onc  Bacteremia  -E. Faecalis bacteremia  -ABX per ID  Epistaxis  -Per ENT  -Breathing comfortably on 40% humidified face tent, keep sats >90%    2/6:    2/6  Pulmonary Vasculitis  -Steroids per rheum  -Plan for eventual Rituxan /l she is on room air at this time  -Bactrim for PCP ppx  MDS  -Per heme/onc  Bacteremia  -E. Faecalis bacteremia  -ABX per ID  Epistaxis  -Per ENT  - she is on room air today  : cont to mantain o2 sao2 above 90% all the ti me:     acp      2/7:  Pulmonary Vasculitis  -Steroids per rheum  -Plan for eventual Rituxan /l she is on room air at this time  -Bactrim for PCP ppx  MDS  -Per heme/onc  Bacteremia  -E. Faecalis bacteremia  -ABX per ID : awaiting clearance from id for chemo : last blood cultures have been negative  Epistaxis  -Per ENT  - she is on room air today  : cont to mantain o2 sao2 above 90% all the ti me:     acp    2/8:  Pulmonary Vasculitis  -Steroids per rheum  -Plan for eventual Rituxan /l she is on room air at this time  -Bactrim for PCP ppx  MDS  -Per heme/onc  Bacteremia  -E. Faecalis bacteremia  -ABX per ID : awaiting clearance from id for chemo : last blood cultures have been negative : finishing antbiotics today  :  Epistaxis  -Per ENT  - she is on room air today  : cont to mantain o2 sao2 above 90% all the ti me:  Thrombocytopenia:  sign today  : 14k: no obvious bleeding: cont to monitorial : transfuse per [facility]     acp      2/9:    Pulmonary Vasculitis : Steroids per rheum : Plan for eventual Rituxan /l she is on room air at this time : Bactrim for PCP ppx : on room air: with no change in her resp status  MDS : s'p BM biopsy : has MDS : defer to [facility]  Bacteremia E. Faecalis bacteremia  -ABX per ID : awaiting clearance from id for chemo : last blood cultures have been negative : finished antibiotics   Epistaxis Per ENT : she is on room air today  : cont to mantain o2 sao2 above 90% all the ti me:  Thrombocytopenia:  sign today  : 10k: no obvious bleeding: cont to monitorial : transfuse per [facility] :? for plt transfusion  today      dw acp    2/10:  Pulmonary Vasculitis : Steroids per rheum : Plan for eventual Rituxan /l she is on room air at this time : Bactrim for PCP ppx : on room air: with no change in her resp status : now plan for rituximab aftger 5 days of dacogen   MDS : s'p BM biopsy : has MDS : defer to [facility]mon[facility]  Bacteremia E. Faecalis bacteremia  -ABX per ID : awaiting clearance from id for chemo : last blood cultures have been negative : finished antibiotics   Epistaxis Per ENT : she is on room air today  : cont to mantain o2 sao2 above 90% all the ti me:  Thrombocytopenia:  still low, but much better,  no obvious bleeding: cont to monitorial :  dw acp    2/12:  Pulmonary Vasculitis : Steroids per rheum : Plan for eventual Rituxan /l she is on room air at this time : Bactrim for PCP ppx : on room air: with no change in her resp status : now plan for rituximab after 5 days of dacogen : today is day 3 : doing  ok : no resp idstress  MDS : s'p BM biopsy : has MDS : defer to [facility]  Bacteremia E. Faecalis bacteremia  -ABX per ID : awaiting clearance from id for chemo : last blood cultures have been negative : finished antibiotics   Epistaxis Per ENT : she is on room air today  : cont to mantain o2 sao2 above 90% all the ti me:  Thrombocytopenia:  still low, but much better,  no obvious bleeding: cont to monitor :  dw acp      2/13:  Low grade temperature : pancultured:  chest xray with improvement in infiltrates : id following : no antibiotics yet:   Pulmonary Vasculitis : Steroids per rheum : Plan for eventual Rituxan.  she is on room air at this time : Bactrim for PCP ppx : on 2 L of oxygen today : rpt chest xray noted:   MDS : s'p BM biopsy : has MDS : defer to [facility]  Bacteremia E. Faecalis bacteremia  -ABX per ID : awaiting clearance from id for chemo : last blood cultures have been negative : finished antibiotics   Epistaxis Per ENT : resolved  Thrombocytopenia:  for transfusion today    dw acp : pt looks pretty weak and cachectic:      2/14;  Low grade temperature :resolved  pancultured:  chest xray with improvement in infiltrates : id following : no antibiotics yet:   Pulmonary Vasculitis : Steroids per rheum : Plan for eventual Rituxan.  she is on room air at this time : Bactrim for PCP ppx : on 2 L of oxygen today : rpt chest xray noted:   MDS : s'p BM biopsy : has MDS : defer to [facility]  Bacteremia E. Faecalis bacteremia: resolved:   Epistaxis Per ENT : resolved  Thrombocytopenia:  for transfusion today    dw acp : pt looks pretty weak and cachectic:        2/15:    Low grade temperature :resolved  pancultured:  chest xray with improvement in infiltrates : id following : no antibiotics yet: still   Pulmonary Vasculitis : Steroids per rheum : Plan for eventual Rituxan.  she is on room air at this time : Bactrim for PCP ppx : on 2 L of oxygen today : rpt chest xray noted: for rituximab per rheum:   MDS :with increased blasts:  s'p BM biopsy : has MDS : defer to [facility]mon[facility] : gettng chemo   Bacteremia E. Faecalis bacteremia: resolved:   Epistaxis Per ENT : resolved  Thrombocytopenia:  for transfusion today    dw acp : pt looks pretty weak and cachectic:  :  cont current suppotive care: p lats transfusion    2/16:    Hypoxic resp failure:  her o2 requirement has increased today  : chest xray ordered: and ABG orderd:  she also seems confused today :   Low grade temperature :resolved  pancultured:  chest xray with improvement in infiltrates : id following : no antibiotics yet: still   Pulmonary Vasculitis : Steroids per rheum : Plan for eventual Rituxan.  she is on room air at this time : Bactrim for PCP ppx : on 4 L of oxygen today : rpt chest xray orderd: : for rituximab per rheum:   MDS :with increased blasts:  s'p BM biopsy : has MDS : defer to [facility] : gettng chemo   Bacteremia E. Faecalis bacteremia: resolved:   Epistaxis Per ENT : resolved  Thrombocytopenia:  for transfusion today     pt looks pretty weak and cachectic:  :  cont current supportive care: p lats transfusion:  today she isnot looking good: more hypoxic: await abg"  dw acp    2/17:    Hypoxic resp failure:  her o2 requirement has increased today  : chest xray ordered: and ABG orderd:  she also seems confused today : CT SCAN CHEST REVIEWED:  SHOWED; Multiple bilateral nodular and opacities many of which have increased in  size or are new since prior study on 1/18/2023. Findings are concerning  for worsening infection although superimposed progression of vasculitis  can have a similar appearance. Short interval follow-up CT scan in one  month is recommended. Mild interval improvement of the right lung base  patchy opacities as compared with January 18, 2023. Nodular opacity in the right upper lobe along the suture line mildly  increased in size, an indeterminate finding. Correlation with the biopsy  pathology results is recommended. Slight improvement of the pleural effusions bilaterally. Interlobular  septal thickening suggestive of mild pulmonary edema. SHE HAS WORSENING CT SCAN CHEST AND SHEIS REQUIRING TRANSFUSIONS ALMOST DAILY : WORSENING OF VASCULITIS ? FOR RITUXIMAB : ID FOLLOW UP NEEDS TO BE DONE:  OVERALL SHEIS DOING PRETTY POORLY:   Low grade temperature :resolved  pancultured:  chest xray with improvement in infiltrates : id following : no antibiotics yet: still   Pulmonary Vasculitis : Steroids per rheum : Plan for eventual Rituxan.  she is on room air at this time : Bactrim for PCP ppx : on 4 L of oxygen today :  for rituximab per rheum:   MDS :with increased blasts:  s'p BM biopsy : has MDS : defer to [facility]mon[facility] : gettng chemo   Bacteremia E. Faecalis bacteremia: resolved:   Epistaxis Per ENT : resolved  Thrombocytopenia:  for transfusion today     pt looks pretty weak and cachectic:  :  cont current supportive care: p lats transfusion:  today she is not looking good: TODAY TOO  more hypoxic: abg YETERAY WAS PRETTY DECENT; WITH MILD RESP ALKALOSIS AND ME ACIDOSIS:   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 She presented here from [facility] for elevated WBC and low hemoglobin, severe weakness. Pt states for the past 2-3 days has been having increased weakness and lethargy, decreased appetite. +sputum productive cough. + cui, no sob, no difficulty breathing. No abdominal pain, nausea, vomiting, no bloody stools. Has endorsed multiple episodes of loose stools x weeks, currently being treated for c.diff with oral vancomycin.       Fatigue/dyspnea: likely due to severe anemia :  pt s/p 1 unit PRB  Diarrhea  Pulmonary vasculitis  :  hx of Tachycardia :  Recent TTE on 11/3/22 reviewed: normal LV. outpt card Dr. Ady Cooper  Anxiety and depression  GERD (gastroesophageal reflux disease)  Hyperlipidemia.   DVT ppx     1/20:  Fatigue/dyspnea: likely due to severe anemia :  pt s/p 1 unit PRBC: hb now  > 10  : she is on 2 L of oxygen : no wheezing: no overt signs of bleeding : ct chest is abnormal report noted:  has jean-claude ill defined opacities of unclear etiology  : venous ph is mild acidotic:  no need for Bipap at this time : monitor and trend hb  Diarrhea : symptomatic rx:  workup per primary team  Pulmonary vasculitis  : per rheumatology  : had recent vats surgery : LN biopsy noted:   hx of Tachycardia :  Recent TTE on 11/3/22 reviewed: normal LV. outpt card Dr. Ady Cooper  Anxiety and depression  GERD (gastroesophageal reflux disease) : ppi   Hyperlipidemia.   recent covid  : o n last admission she was treated for covid infection:   DVT ppx   d wteam    1/22:    Fatigue/dyspnea: likely due to severe anemia :  pt s/p 1 unit PRBC: hb now  > 10  : she is on 2 L of oxygen : no wheezing: no overt signs of bleeding : ct chest is abnormal report noted:  has jean-claude ill defined opacities of unclear etiology  : venous ph is mild acidotic:  no need for Bipap at this time : monitor and trend hb: she remained stable o gracie last 1 days:  she is not on any antibtiocs at this time: RVP  and blood cultures are negative: she had ebus biopsy also before: reviewed: ID following  Diarrhea : symptomatic rx:  workup per primary team : seems to have resolved  Pulmonary vasculitis  : per rheumatology  : had recent vats surgery : LN biopsy noted:   Bicytopneia and leucocytosis: ? etiology  : for possible bone marrow biopsy on Monday    hx of Tachycardia :  Recent TTE on 11/3/22 reviewed: normal LV. outpt card Dr. Ady Cooper  Anxiety and depression  GERD (gastroesophageal reflux disease) : ppi   Hyperlipidemia.   recent covid  : on last admission she was treated for covid infection:   DVT ppx   dw team    1/23:    Fatigue/dyspnea: likely due to severe anemia : feels weak  but no bleeding noted:  on 2 L of oxygen : she needs PT OT   Diarrhea :resolved:   Pulmonary vasculitis  : per rheumatology  : had recent vats surgery : LN biopsy noted: : she never received teat ment for vasculitis except low dose steroids:  she is awaiting bone marrow biopsy prior to induction therapy with rituximab: The ct chest done on this admission does not show pneumothorax and has jean-claude effusions:  There are some new opacites in rigth lower lobe which were not therre:  clinically she does not seem to have pneumonia: ID following: could it be a prt of vasculitis  Bicytopneia and leucocytosis: ? etiology  : for possible bone marrow biopsy today  hx of Tachycardia :  Recent TTE on 11/3/22 reviewed: normal LV. outpt card Dr. Ady Cooper  Anxiety and depression  GERD (gastroesophageal reflux disease) : ppi   Hyperlipidemia.   recent covid  : on last admission she was treated for covid infection:   DVT ppx   dw team    1/24:    Fatigue/dyspnea: likely due to severe anemia : feels weak  but no bleeding noted:  on 2 L of oxygen : she needs PT OT   Diarrhea :resolved:   Pulmonary vasculitis  : per rheumatology  : had recent vats surgery : LN biopsy noted: : she never received teat ment for vasculitis except low dose steroids:  she is awaiting bone marrow biopsy prior to induction therapy with rituximab: The ct chest done on this admission does not show pneumothorax and has jean-claude effusions:  There are some new opacites in rigth lower lobe which were not therre:  clinically she does not seem to have pneumonia: ID following: could it be a prt of vasculitis  Bicytopneia and leucocytosis: BM biopsy done: await results for eventual immunosuppressives therapy:   hx of Tachycardia :  Recent TTE on 11/3/22 reviewed: normal LV. outpt card Dr. Ady Cooper  Anxiety and depression  GERD (gastroesophageal reflux disease) : ppi   Hyperlipidemia.   recent covid  : on last admission she was treated for covid infection:   DVT ppx   dw team    1/25:    Fatigue/dyspnea: likely due to severe anemia : feels weak  but no bleeding noted:  on 2 L of oxygen : she needs PT OT   Diarrhea :resolved:   Pulmonary vasculitis  : per rheumatology  : had recent vats surgery : LN biopsy noted: : she never received teat ment for vasculitis except low dose steroids:  she is awaiting bone marrow biopsy prior to induction therapy with rituximab: The ct chest done on this admission does not show pneumothorax and has jean-claude effusions:  There are some new opacites in rigth lower lobe which were not there:  clinically she does not seem to have pneumonia: ID following: could it be a part of vasculitis : her resp status has not changed   Bicytopneia and leucocytosis: BM biopsy done: await results for still pending   hx of Tachycardia :  Recent TTE on 11/3/22 reviewed: normal LV. outpt card Dr. Ady Cooper  Anxiety and depression  GERD (gastroesophageal reflux disease) : ppi   Hyperlipidemia.   recent covid  : on last admission she was treated for covid infection:   DVT ppx   dw team: awaiting decision on immunosuppression     1/26;      Fatigue/dyspnea: likely due to severe anemia : feels weak  but no bleeding noted:  on 2 L of oxygen : she needs PT OT : got it yesterday    Diarrhea :resolved:   Pulmonary vasculitis  : per rheumatology  : had recent vats surgery : LN biopsy noted: : she never received teat ment for vasculitis except low dose steroids:  she is awaiting bone marrow biopsy prior to induction therapy with rituximab: The ct chest done on this admission does not show pneumothorax and has jean-claude effusions:  There are some new opacities in rigth lower lobe which were not there:  clinically she does not seem to have pneumonia: ID following: could it be a part of vasculitis : her resp status has not changed  : being observed off antibiotics   Bicytopneia and leucocytosis: BM biopsy done: await results for still pending   hx of Tachycardia :  Recent TTE on 11/3/22 reviewed: normal LV. outpt card Dr. Ady Cooper  Anxiety and depression  GERD (gastroesophageal reflux disease) : ppi   Hyperlipidemia.   recent covid  : on last admission she was treated for covid infection:   DVT ppx   dw team: awaiting decision on immunosuppression     1/27:    Fatigue/dyspnea: likely due to severe anemia : feels weak  but no bleeding noted:  on 2 L of oxygen : cont  PT OT :   Diarrhea :resolved:   Pulmonary vasculitis  : per rheumatology  : had recent vats surgery : LN biopsy noted: : she never received teat ment for vasculitis except low dose steroids:  she is awaiting bone marrow biopsy prior to induction therapy with rituximab: The ct chest done on this admission does not show pneumothorax and has jean-claude effusions:  There are some new opacities in rigth lower lobe which were not there:  clinically she does not seem to have pneumonia: ID following: could it be a part of vasculitis : her resp status has not changed  : being observed off antibiotics :  on bactrim prophylaxis as she is on chr steroids   Bicytopneia and leucocytosis: BM biopsy done: await results for still pending   hx of Tachycardia :  Recent TTE on 11/3/22 reviewed: normal LV. outpt card Dr. Ady Cooper  Anxiety and depression  GERD (gastroesophageal reflux disease) : ppi   Hyperlipidemia.   recent covid  : on last admission she was treated for covid infection:   DVT ppx   dw team: awaiting decision on immunosuppression     1/29:    Fatigue/dyspnea: likely due to severe anemia : feels weak  but no bleeding noted:  on 2 L of oxygen : cont  PT OT :   Diarrhea :resolved:   Pulmonary vasculitis  : per rheumatology  : had recent vats surgery : LN biopsy noted: : she never received treat ment for vasculitis except low dose steroids:  she is awaiting bone marrow biopsy prior to induction therapy with rituximab: The ct chest done on this admission does not show pneumothorax and has jean-claude effusions:  There are some new opacities in rigth lower lobe which were not there:  clinically she does not seem to have pneumonia: ID following: could it be a part of vasculitis : her resp status has not changed  : being observed off antibiotics :  on bactrim prophylaxis as she is on chr steroids   Bicytopneia and leucocytosis: BM biopsy: results reviewed defer to [facility]  hx of Tachycardia :  Recent TTE on 11/3/22 reviewed: normal LV.   Anxiety and depression  GERD (gastroesophageal reflux disease) : ppi   Hyperlipidemia.   recent covid  : on last admission she was treated for covid infection:   DVT ppx   dw team: awaiting decision on immunosuppression     1/30:    Fatigue/dyspnea: likely due to severe anemia : feels weak  but no bleeding noted:  on 2 L of oxygen : cont  PT OT : sitting in chair today   Diarrhea :resolved:   Pulmonary vasculitis  : per rheumatology  : had recent vats surgery : LN biopsy noted: : she never received treat ment for vasculitis except low dose steroids:  she is awaiting bone marrow biopsy prior to induction therapy with rituximab: The ct chest done on this admission does not show pneumothorax and has jean-claude effusions:  There are some new opacities in rigth lower lobe which were not there:  clinically she does not seem to have pneumonia: ID following: could it be a part of vasculitis : her resp status has not changed  : being observed off antibiotics :  on bactrim prophylaxis as she is on chr steroids  /l however she had low grade tempt today : rvp is negative : cultures sent: ID follow up   Bicytopneia and leucocytosis: BM biopsy: results reviewed defer to [facility]  hx of Tachycardia :  Recent TTE on 11/3/22 reviewed: normal LV.   Anxiety and depression  GERD (gastroesophageal reflux disease) : ppi   Hyperlipidemia.   recent covid  : on last admission she was treated for covid infection:   DVT ppx   dw team: awaiting decision on immunosuppression     1/31:    Fatigue/dyspnea: likely due to severe anemia : feels weak  but no bleeding noted:  on 2 L of oxygen : cont  PT OT : lying in bed:   Diarrhea :resolved:   Pulmonary vasculitis  : per rheumatology  : had recent vats surgery : LN biopsy noted: : she never received treat ment for vasculitis except low dose steroids:  she is awaiting bone marrow biopsy prior to induction therapy with rituximab: The ct chest done on this admission does not show pneumothorax and has jean-claude effusions:  There are some new opacities in rigth lower lobe which were not there:  clinically she does not seem to have pneumonia: ID following: could it be a part of vasculitis : her resp status has not changed  : being observed off antibiotics :  on bactrim prophylaxis as she is on chr steroids : she seems OK:  no sob:     Bicytopneia and leucocytosis: BM biopsy: results reviewed defer to [facility] ; for eventual chemo   hx of Tachycardia :  Recent TTE on 11/3/22 reviewed: normal LV.   Anxiety and depression  GERD (gastroesophageal reflux disease) : ppi   Hyperlipidemia.   recent covid  : on last admission she was treated for covid infection:   DVT ppx   dw team: awaiting decision on immunosuppression     2/1:    Fatigue/dyspnea: likely due to severe anemia : feels weak  but no bleeding noted:  on 2 L of oxygen : cont  PT OT : lying in bed:  her blood cultures are contaminant:   Diarrhea :resolved:   Pulmonary vasculitis  : per rheumatology  : had recent vats surgery : LN biopsy noted: : she never received treat ment for vasculitis except low dose steroids:  she is awaiting bone marrow biopsy prior to induction therapy with rituximab: The ct chest done on this admission does not show pneumothorax and has jean-claude effusions:  There are some new opacities in rigth lower lobe which were not there:  clinically she does not seem to have pneumonia: ID following: could it be a part of vasculitis : her resp status has not changed  : being observed off antibiotics :  on bactrim prophylaxis as she is on chr steroids : she seems OK:  no sob:     Bicytopneia and leucocytosis: BM biopsy: results reviewed defer to [facility] ; for eventual chemo   hx of Tachycardia :  Recent TTE on 11/3/22 reviewed: normal LV.   Anxiety and depression  GERD (gastroesophageal reflux disease) : ppi   Hyperlipidemia.   recent covid  : on last admission she was treated for covid infection:   DVT ppx   dw team: awaiting decision on immunosuppression: overall her general condition seems very poor and very weak:     2/2:    Fatigue/dyspnea: likely due to severe anemia : feels weak  but no bleeding noted:  on 2 L of oxygen : cont  PT OT : lying in bed:  her blood cultures are positive E FAECALIS :  ON MEROPENEM  Diarrhea :resolved:   Pulmonary vasculitis  : per rheumatology  : had recent vats surgery : LN biopsy noted: : she never received treat ment for vasculitis except low dose steroids:  she is awaiting bone marrow biopsy prior to induction therapy with rituximab: The ct chest done on this admission does not show pneumothorax and has jean-claude effusions:  There are some new opacities in rigth lower lobe which were not there:  clinically she does not seem to have pneumonia: ID following: could it be a part of vasculitis : her resp status has not changed  : being observed off antibiotics :  on bactrim prophylaxis as she is on chr steroids : she seems OK:  no sob:     Bicytopneia and leucocytosis: BM biopsy: results reviewed defer to [facility] ; for eventual chemo   hx of Tachycardia :  Recent TTE on 11/3/22 reviewed: normal LV.   Anxiety and depression  GERD (gastroesophageal reflux disease) : ppi   Hyperlipidemia.   recent covid  : on last admission she was treated for covid infection:   DVT ppx   dw team: awaiting decision on immunosuppression: overall her general condition seems very poor and very weak: ON ANTIBIOTICS     2/3   Pulmonary Vasculitis  -Steroids per rheum  -Plan for eventual Rituxan  -Bactrim for PCP ppx  MDS  -Per heme/onc  Bacteremia  -E. Faecalis bacteremia  -ABX per ID  Epistaxis  -Per ENT  -Breathing comfortably on 40% humidified face tent, keep sats >90%    2/6:    2/6  Pulmonary Vasculitis  -Steroids per rheum  -Plan for eventual Rituxan /l she is on room air at this time  -Bactrim for PCP ppx  MDS  -Per heme/onc  Bacteremia  -E. Faecalis bacteremia  -ABX per ID  Epistaxis  -Per ENT  - she is on room air today  : cont to mantain o2 sao2 above 90% all the ti me:     acp      2/7:  Pulmonary Vasculitis  -Steroids per rheum  -Plan for eventual Rituxan /l she is on room air at this time  -Bactrim for PCP ppx  MDS  -Per heme/onc  Bacteremia  -E. Faecalis bacteremia  -ABX per ID : awaiting clearance from id for chemo : last blood cultures have been negative  Epistaxis  -Per ENT  - she is on room air today  : cont to mantain o2 sao2 above 90% all the ti me:     acp    2/8:  Pulmonary Vasculitis  -Steroids per rheum  -Plan for eventual Rituxan /l she is on room air at this time  -Bactrim for PCP ppx  MDS  -Per heme/onc  Bacteremia  -E. Faecalis bacteremia  -ABX per ID : awaiting clearance from id for chemo : last blood cultures have been negative : finishing antbiotics today  :  Epistaxis  -Per ENT  - she is on room air today  : cont to mantain o2 sao2 above 90% all the ti me:  Thrombocytopenia:  sign today  : 14k: no obvious bleeding: cont to monitorial : transfuse per [facility]     acp      2/9:    Pulmonary Vasculitis : Steroids per rheum : Plan for eventual Rituxan /l she is on room air at this time : Bactrim for PCP ppx : on room air: with no change in her resp status  MDS : s'p BM biopsy : has MDS : defer to [facility]  Bacteremia E. Faecalis bacteremia  -ABX per ID : awaiting clearance from id for chemo : last blood cultures have been negative : finished antibiotics   Epistaxis Per ENT : she is on room air today  : cont to mantain o2 sao2 above 90% all the ti me:  Thrombocytopenia:  sign today  : 10k: no obvious bleeding: cont to monitorial : transfuse per [facility] :? for plt transfusion  today      dw acp    2/10:  Pulmonary Vasculitis : Steroids per rheum : Plan for eventual Rituxan /l she is on room air at this time : Bactrim for PCP ppx : on room air: with no change in her resp status : now plan for rituximab aftger 5 days of dacogen   MDS : s'p BM biopsy : has MDS : defer to [facility]mon[facility]  Bacteremia E. Faecalis bacteremia  -ABX per ID : awaiting clearance from id for chemo : last blood cultures have been negative : finished antibiotics   Epistaxis Per ENT : she is on room air today  : cont to mantain o2 sao2 above 90% all the ti me:  Thrombocytopenia:  still low, but much better,  no obvious bleeding: cont to monitorial :  dw acp    2/12:  Pulmonary Vasculitis : Steroids per rheum : Plan for eventual Rituxan /l she is on room air at this time : Bactrim for PCP ppx : on room air: with no change in her resp status : now plan for rituximab after 5 days of dacogen : today is day 3 : doing  ok : no resp idstress  MDS : s'p BM biopsy : has MDS : defer to [facility]mon[facility]  Bacteremia E. Faecalis bacteremia  -ABX per ID : awaiting clearance from id for chemo : last blood cultures have been negative : finished antibiotics   Epistaxis Per ENT : she is on room air today  : cont to mantain o2 sao2 above 90% all the ti me:  Thrombocytopenia:  still low, but much better,  no obvious bleeding: cont to monitor :  dw acp      2/13:  Low grade temperature : pancultured:  chest xray with improvement in infiltrates : id following : no antibiotics yet:   Pulmonary Vasculitis : Steroids per rheum : Plan for eventual Rituxan.  she is on room air at this time : Bactrim for PCP ppx : on 2 L of oxygen today : rpt chest xray noted:   MDS : s'p BM biopsy : has MDS : defer to [facility]mon[facility]  Bacteremia E. Faecalis bacteremia  -ABX per ID : awaiting clearance from id for chemo : last blood cultures have been negative : finished antibiotics   Epistaxis Per ENT : resolved  Thrombocytopenia:  for transfusion today    dw acp : pt looks pretty weak and cachectic:      2/14;  Low grade temperature :resolved  pancultured:  chest xray with improvement in infiltrates : id following : no antibiotics yet:   Pulmonary Vasculitis : Steroids per rheum : Plan for eventual Rituxan.  she is on room air at this time : Bactrim for PCP ppx : on 2 L of oxygen today : rpt chest xray noted:   MDS : s'p BM biopsy : has MDS : defer to [facility]  Bacteremia E. Faecalis bacteremia: resolved:   Epistaxis Per ENT : resolved  Thrombocytopenia:  for transfusion today    dw acp : pt looks pretty weak and cachectic:        2/15:    Low grade temperature :resolved  pancultured:  chest xray with improvement in infiltrates : id following : no antibiotics yet: still   Pulmonary Vasculitis : Steroids per rheum : Plan for eventual Rituxan.  she is on room air at this time : Bactrim for PCP ppx : on 2 L of oxygen today : rpt chest xray noted: for rituximab per rheum:   MDS :with increased blasts:  s'p BM biopsy : has MDS : defer to [facility] : gettng chemo   Bacteremia E. Faecalis bacteremia: resolved:   Epistaxis Per ENT : resolved  Thrombocytopenia:  for transfusion today    dw acp : pt looks pretty weak and cachectic:  :  cont current suppotive care: p lats transfusion    2/16:    Hypoxic resp failure:  her o2 requirement has increased today  : chest xray ordered: and ABG orderd:  she also seems confused today :   Low grade temperature :resolved  pancultured:  chest xray with improvement in infiltrates : id following : no antibiotics yet: still   Pulmonary Vasculitis : Steroids per rheum : Plan for eventual Rituxan.  she is on room air at this time : Bactrim for PCP ppx : on 4 L of oxygen today : rpt chest xray orderd: : for rituximab per rheum:   MDS :with increased blasts:  s'p BM biopsy : has MDS : defer to [facility]mon[facility] : gettng chemo   Bacteremia E. Faecalis bacteremia: resolved:   Epistaxis Per ENT : resolved  Thrombocytopenia:  for transfusion today     pt looks pretty weak and cachectic:  :  cont current supportive care: p lats transfusion:  today she isnot looking good: more hypoxic: await abg"  dw acp    2/17:    Hypoxic resp failure:  her o2 requirement has increased today  : chest xray ordered: and ABG orderd:  she also seems confused today : CT SCAN CHEST REVIEWED:  SHOWED; Multiple bilateral nodular and opacities many of which have increased in  size or are new since prior study on 1/18/2023. Findings are concerning  for worsening infection although superimposed progression of vasculitis  can have a similar appearance. Short interval follow-up CT scan in one  month is recommended. Mild interval improvement of the right lung base  patchy opacities as compared with January 18, 2023. Nodular opacity in the right upper lobe along the suture line mildly  increased in size, an indeterminate finding. Correlation with the biopsy  pathology results is recommended. Slight improvement of the pleural effusions bilaterally. Interlobular  septal thickening suggestive of mild pulmonary edema. SHE HAS WORSENING CT SCAN CHEST AND SHEIS REQUIRING TRANSFUSIONS ALMOST DAILY : WORSENING OF VASCULITIS ? FOR RITUXIMAB : ID FOLLOW UP NEEDS TO BE DONE:  OVERALL SHEIS DOING PRETTY POORLY:   Low grade temperature :resolved  pancultured:  chest xray with improvement in infiltrates : id following : no antibiotics yet: still   Pulmonary Vasculitis : Steroids per rheum : Plan for eventual Rituxan.  she is on room air at this time : Bactrim for PCP ppx : on 4 L of oxygen today :  for rituximab per rheum:   MDS :with increased blasts:  s'p BM biopsy : has MDS : defer to [facility]monc : gettng chemo   Bacteremia E. Faecalis bacteremia: resolved:   Epistaxis Per ENT : resolved  Thrombocytopenia:  for transfusion today     pt looks pretty weak and cachectic:  :  cont current supportive care: p lats transfusion:  today she is not looking good: TODAY TOO  more hypoxic: abg YETERAY WAS PRETTY DECENT; WITH MILD RESP ALKALOSIS AND ME ACIDOSIS:   [facility]    2/18 for Mehrishi: comfortable on 2L at present, CT chest reviewed. Cont diuresis. Steroids as per rheum. Decision pending on rituximab.

## 2023-02-18 NOTE — PROGRESS NOTE ADULT - SUBJECTIVE AND OBJECTIVE BOX
[facility]   MD RONEL FRIAS MD RUORU WONG, PA    TEL:  FROM 9 AM to 5 PM ---OFFICE: [phone]    FROM 5 PM - 9 AM PLEASE CALL ANSWERING SERVICE: [phone]    RENAL FOLLOW UP NOTE--Date of Service 02-18-23 @ 08:25  --------------------------------------------------------------------------------  HPI:      Pt seen and examined at bedside.       PAST HISTORY  --------------------------------------------------------------------------------  No significant changes to PMH, PSH, FHx, SHx, unless otherwise noted    ALLERGIES & MEDICATIONS  --------------------------------------------------------------------------------  Allergies    codeine (Nausea)  doxycycline (Nausea)  penicillin (Hives)    Intolerances      Standing Inpatient Medications  acyclovir IVPB 260 milliGRAM(s) IV Intermittent every 8 hours  Biotene Dry Mouth Oral Rinse 15 milliLiter(s) Swish and Spit four times a day  budesonide 160 MICROgram(s)/formoterol 4.5 MICROgram(s) Inhaler 2 Puff(s) Inhalation two times a day  chlorhexidine 2% Cloths 1 Application(s) Topical daily  cholecalciferol 2000 Unit(s) Oral daily  citalopram 10 milliGRAM(s) Oral daily  FIRST- Mouthwash  BLM 10 milliLiter(s) Swish and Spit four times a day  fluticasone propionate 50 MICROgram(s)/spray Nasal Spray 1 Spray(s) Both Nostrils two times a day  folic acid 1 milliGRAM(s) Oral daily  furosemide   Injectable 20 milliGRAM(s) IV Push daily  guaiFENesin  milliGRAM(s) Oral every 12 hours  influenza  Vaccine (HIGH DOSE) 0.7 milliLiter(s) IntraMuscular once  ipratropium    for Nebulization 500 MICROGram(s) Nebulizer every 6 hours  lactobacillus acidophilus 1 Tablet(s) Oral daily  metoprolol tartrate 25 milliGRAM(s) Oral two times a day  mirtazapine 7.5 milliGRAM(s) Oral daily  pantoprazole    Tablet 40 milliGRAM(s) Oral before breakfast  predniSONE   Tablet 20 milliGRAM(s) Oral daily  senna 1 Tablet(s) Oral daily  sodium chloride 0.65% Nasal 2 Spray(s) Both Nostrils every 6 hours  trimethoprim  160 mG/sulfamethoxazole 800 mG 1 Tablet(s) Oral daily    PRN Inpatient Medications  acetaminophen     Tablet .. 650 milliGRAM(s) Oral every 6 hours PRN  aluminum hydroxide/magnesium hydroxide/simethicone Suspension 30 milliLiter(s) Oral every 4 hours PRN  benzocaine/menthol Lozenge 1 Lozenge Oral every 8 hours PRN  melatonin 3 milliGRAM(s) Oral at bedtime PRN  ondansetron Injectable 4 milliGRAM(s) IV Push every 8 hours PRN  polyethylene glycol 3350 17 Gram(s) Oral daily PRN      REVIEW OF SYSTEMS  --------------------------------------------------------------------------------  General: no fever  MSK: no edema     VITALS/PHYSICAL EXAM  --------------------------------------------------------------------------------  T(C): 36.5 (02-18-23 @ 04:37), Max: 36.7 (02-17-23 @ 11:30)  HR: 109 (02-18-23 @ 04:37) (102 - 109)  BP: 123/68 (02-18-23 @ 04:37) (93/57 - 146/77)  RR: 18 (02-18-23 @ 04:37) (16 - 18)  SpO2: 95% (02-18-23 @ 04:37) (93% - 100%)  Wt(kg): --        02-17-23 @ 07:01  -  02-18-23 @ 07:00  --------------------------------------------------------  IN: 480 mL / OUT: 800 mL / NET: -320 mL      Physical Exam:  	Gen: NAD  	HEENT: MMM  	Pulm: CTA B/L  	CV: S1S2  	Abd: Soft, +BS  	Ext: No LE edema B/L                      Neuro: Awake   	Skin: Warm and Dry   	Vascular access: NO HD catheter            no  john  LABS/STUDIES  --------------------------------------------------------------------------------              9.7    19.05 >-----------<  4        [02-18-23 @ 07:09]              27.1     144  |  106  |  33  ----------------------------<  107      [02-18-23 @ 07:06]  3.1   |  23  |  0.58        Ca     7.7     [02-18-23 @ 07:06]      Mg     1.6     [02-18-23 @ 07:06]      Phos  2.8     [02-18-23 @ 07:06]    TPro  5.3  /  Alb  2.9  /  TBili  2.0  /  DBili  0.9  /  AST  27  /  ALT  5   /  AlkPhos  93  [02-18-23 @ 07:06]        Uric acid 5.7      [02-18-23 @ 07:06]  LDH 1014      [02-18-23 @ 07:06]    Creatinine Trend:  SCr 0.58 [02-18 @ 07:06]  SCr 0.72 [02-17 @ 06:34]  SCr 0.53 [02-16 @ 04:09]  SCr 0.65 [02-15 @ 07:42]  SCr 0.80 [02-14 @ 06:38]    Urinalysis - [02-01-23 @ 09:57]      Color Yellow / Appearance Slightly Turbid / SG 1.033 / pH 7.0      Gluc Negative / Ketone Trace  / Bili Negative / Urobili Negative       Blood Small / Protein 300 mg/dL / Leuk Est Negative / Nitrite Negative      RBC 4 /  / Hyaline 4 / Gran  / Sq Epi  / Non Sq Epi 10 / Bacteria Moderate      Iron 152, TIBC 180, %sat 84      [01-19-23 @ 11:19]  Ferritin 5768      [01-19-23 @ 11:19]  Vitamin D (25OH) 28.1      [01-19-23 @ 11:19]  Lipid: chol 84, TG 81, HDL 22, LDL --      [10-01-22 @ 07:10]    HBsAg Nonreact      [01-25-23 @ 07:18]  HBcAb Nonreact      [01-25-23 @ 07:18]  HCV 0.15, Nonreact      [01-25-23 @ 07:18]    MPO-ANCA <5.0, interpretation: Negative      [02-02-23 @ 07:22]  PR3-ANCA <5.0, interpretation: Negative      [02-02-23 @ 07:22]  Free Light Chains: kappa 3.32, lambda 2.61, ratio = 1.27      [02-17 @ 06:35]

## 2023-02-18 NOTE — PROVIDER CONTACT NOTE (CRITICAL VALUE NOTIFICATION) - SITUATION
Pt p/w positive blood culture with gram positive cocci in aerobic bottle of set 1 and gram positive clusters in aerobic bottle of set 2. Pt is A&Ox2, currently receiving meropenem IVPB and bactrim PO.
WBC 40.74
Platelet of 5
NP ZETA Huma Carias
Pt WCT 49.53, Plt 10
Pt p/w WBC 54.99, Hgb 6.1, Hct 8.2, and PLT 14. No obvious signs of bleeding, pt is A&Ox4, no s/s of transfusion reaction from recent 1u PRBC. VSS.
critical WBC value 48.45
blood culture
Pt WBC 49.81
WBC 47.65; hgb: 5.9; Hct: 17.3
HSV1 POSITIVE
Pt WCT 73.31

## 2023-02-18 NOTE — PROGRESS NOTE ADULT - SUBJECTIVE AND OBJECTIVE BOX
[facility], Division of Infectious Diseases  ANDREW Rodríguez Y. Patel, S. Shah, G. Bebeto  [phone]  after hours and weekends [phone]    Name: BETTIE NGUYEN  Age: 82y  Gender: Female  MRN: [id number]    Interval History--  Notes reviewed and discussed with nursing   family at bedside  pt getting her resp treatment  states she needs to go to the bathroom asking to remove her resp treatment      Allergies    codeine (Nausea)  doxycycline (Nausea)  penicillin (Hives)    Intolerances        Medications--  Antibiotics:  acyclovir IVPB 260 milliGRAM(s) IV Intermittent every 8 hours  trimethoprim  160 mG/sulfamethoxazole 800 mG 1 Tablet(s) Oral daily    Immunologic:  influenza  Vaccine (HIGH DOSE) 0.7 milliLiter(s) IntraMuscular once    Other:  acetaminophen     Tablet .. PRN  aluminum hydroxide/magnesium hydroxide/simethicone Suspension PRN  benzocaine/menthol Lozenge PRN  Biotene Dry Mouth Oral Rinse  budesonide 160 MICROgram(s)/formoterol 4.5 MICROgram(s) Inhaler  chlorhexidine 2% Cloths  cholecalciferol  citalopram  FIRST- Mouthwash  BLM  fluticasone propionate 50 MICROgram(s)/spray Nasal Spray  folic acid  furosemide   Injectable  guaiFENesin ER  ipratropium    for Nebulization  lactobacillus acidophilus  melatonin PRN  metoprolol tartrate  mirtazapine  ondansetron Injectable PRN  pantoprazole    Tablet  polyethylene glycol 3350 PRN  predniSONE   Tablet  senna  sodium chloride 0.65% Nasal      Review of Systems--  unable to obtain     Review of systems otherwise negative except as previously noted.    Physical Examination--  Vital Signs: T(F): 97.7 (02-18-23 @ 09:10), Max: 97.7 (02-18-23 @ 04:37)  HR: 97 (02-18-23 @ 09:10)  BP: 123/77 (02-18-23 @ 09:10)  RR: 18 (02-18-23 @ 09:10)  SpO2: 99% (02-18-23 @ 09:10)  Wt(kg): --  General: cachetic in distress   HEENT: AT/NC.eschar and dried blood in mouth  Neck: Not rigid. No sense of mass.  Nodes: None palpable.  Lungs: Clear bilaterally without rales, wheezing or rhonchi  Heart: Regular rate and rhythm.  Abdomen: Bowel sounds present and normoactive. Soft. Nondistended.  Extremities: No cyanosis or clubbing. No edema.   Skin: Warm. Dry. Good turgor. No rash. No vasculitic stigmata.      Laboratory Studies--  CBC                        8.3    22.30 )-----------( 79       ( 18 Feb 2023 10:18 )             23.7       Chemistries  02-18    144  |  106  |  33<H>  ----------------------------<  107<H>  3.1<L>   |  23  |  0.58    Ca    7.7<L>      18 Feb 2023 07:06  Phos  2.8     02-18  Mg     1.6     02-18    TPro  5.3<L>  /  Alb  2.9<L>  /  TBili  2.0<H>  /  DBili  0.9<H>  /  AST  27  /  ALT  5<L>  /  AlkPhos  93  02-18      Culture Data    Culture - Blood (collected 12 Feb 2023 17:54)  Source: .Blood Blood-Peripheral  Final Report (17 Feb 2023 22:00):    No Growth Final    Culture - Blood (collected 12 Feb 2023 17:26)  Source: .Blood Blood-Peripheral  Final Report (17 Feb 2023 22:00):    No Growth Final

## 2023-02-18 NOTE — CHART NOTE - NSCHARTNOTEFT_GEN_A_CORE
discussed with patient's daughter Leeann at bedside  she has discussed her mom's current situation with her other sibling as well as the patient herself and the decision was to hold off on giving her RTX for her vasculitis  they would like her to be comfortable and requesting palliative care  support provided and all questions answered  please call back with any questions or concerns

## 2023-02-18 NOTE — PROGRESS NOTE ADULT - ASSESSMENT
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 She presented here from [facility] for elevated WBC and low hemoglobin, severe weakness.    #Sinus Tachycardia  -Likely in setting of anemia, overall medical condition, mds, vasculitis  -Continue metoprolol 25mg  -Recent echo with nml lv fxn, mod MS  -Transfusions prn per primary  -Repeat duplex to r/o dvt    #Anemia  -2/2 MDS  -Transfuse prn  -Mgmt per primary, onc    #MDS  -Actively undergoing chemotherapy  -Mgmt per heme/onc    #Pulmonary Vasculitis  -Pulm following  -Cont steroids per pulm    #Hypokalemia  -Replete per primary    #Dyspnea  -multifactorial   -iv lasix per primary team   -patient clinically appears intravascularly depleted   -monitor bmp    35 minutes spent on total encounter; more than 50% of the visit was spent counseling and/or coordinating care by the attending physician.

## 2023-02-18 NOTE — PROVIDER CONTACT NOTE (CRITICAL VALUE NOTIFICATION) - ASSESSMENT
blood culture from 01/30  growth in aerobic bottle - staph epi  growth in anaerobic bottle - gram positive cocci clusters
Patient Asymptomatic
Pt A&O2, VSS, 2L NC, no reports of pain of discomfort.
Pt WBC elevated at base. Critical results WBC 48.45. Pt asymptomatic
Pt p/w WBC 54.99, Hgb 6.1, Hct 8.2, and PLT 14. No obvious signs of bleeding, pt is A&Ox4, no s/s of transfusion reaction from recent 1u PRBC. VSS. Pt already receiving meropenem and bactrim antibiotics.
Pt remain afebrile.
PATIENT ASYMPTOMATIC
Pt A+Ox4, denies cp sob at this time
WBC 47.65; hgb: 5.9; Hct: 17.3
NO S/S BLEEDING NOTED OR FEVER.
Pt A&Ox4, no s/s of bleeding.
in no acute distress
Finger stick 133
Pt A&Ox4, vital signs as charted.
Pt received critical of platelets being 5.
PT. A&OX2. NO FACIAL GRIMACE NOTED. PT'S SLEEPING.
Pt A&Ox4, no s/s of bleeding. VSS as charted
VSS. No active bleeding noted
Pt p/w positive blood culture with gram positive cocci in aerobic bottle of set 1 and gram positive clusters in aerobic bottle of set 2. Pt is A&Ox2, currently receiving meropenem IVPB and bactrim PO.

## 2023-02-18 NOTE — PROVIDER CONTACT NOTE (CRITICAL VALUE NOTIFICATION) - NAME OF MD/NP/PA/DO NOTIFIED:
Jacqueline Parisi ACP
ALLISON GONZALEZ
Arthur Lemons
Aline Haley
JUSTIN Lucas
MARGRET VARGAS
Jacqueline Parisi ACP
Shayy,NP
David Reid
MARGRET CARRENO
Erinn bhandari,NP
MARGRET BE
MARIA Parrish
OLEGARIO HAMILTON NP
Pretty Carlisle
ROMELIA MESA
MD Arias
MARGRET Spears
MARIA Gibson
MARIA Campos
MARIA Rush
Malaika Dooley
NP Renate Pascual [id number]
NP ZETA Huma Carias

## 2023-02-18 NOTE — PROVIDER CONTACT NOTE (CRITICAL VALUE NOTIFICATION) - RECOMMENDATIONS
NOTIFY PROVIDER.
transfuse?
notify provider
Pretty Carlisle made aware.
Malaika Dooley made aware.
NP ZETA Huma Carias made aware
Notify NP
notify provider

## 2023-02-18 NOTE — PROVIDER CONTACT NOTE (CRITICAL VALUE NOTIFICATION) - ACTION/TREATMENT ORDERED:
ACP made aware. No new orders @ this time.
NP UPDATED AND AWARE. NO NEW ORDERS GIVEN.
NP updated and aware. No new orders given
PA aware. No new orders at this time.
provider aware  continue with antibiotics  Will continue with current POC and update as needed.
Aline Haley aware. As per provider, 1 unit of Platelets ordered.
NP updated and aware. No new orders given.
PROVIDER AWARE. WILL ORDER ANTI VIRAL.
PA UPDATED AND AWARE. NO NEW ORDERS GIVEN.
provider aware  1u PRBC ordered  Will continue with current POC and update as needed.
ACP made aware.
MARGRET LEÓN UPDATED AND AWARE. NEW ORDER FOR 1 UNIT OF PLT ORDER.
PA UPDATED AND AWARE. NO NEW ORDERS GIVEN
ACP made aware. ACP ordered 1u Plt, Plt given to pt. VSS.
ACP made aware. Transfuse 1 unit of Platelet order noted.
JUSTIN Carias made aware. No further action ordered at this time. Will continue to monitor.
Orders for 1u PRBC transfusion, and no orders to follow for platelet count. Will reassess Hgb and CBC s/p 1u PRBC.
PA UPDATED AND AWARE. NO NEW ORDERS GIVEN.
provider aware. pt baseline elevated WBC. will continue to monitor
No orders to follow.
PA UPDATED AND AWARE. NO NEW ORDERS GIVEN.
NP made aware, continue to monitor AM labs.
No interventions at this time

## 2023-02-18 NOTE — PROGRESS NOTE ADULT - ASSESSMENT
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 Reports chronic cough, currently nonproductive - RVP/COVID negative. Has multiple episodes of loose stools x weeks, reported recently trying marijuana for appetite and noted worsening. She was given oral vancomycin empirically for C.diff but then became constipated, s/p bowel regimen and having normal bowel movements. Patient initially being monitored off antibiotics given she was afebrile, WBC was stable and no infectious source was found. She had a fever 100.9F on 1/30 overnight with worsening leukocytosis and then 101.4F overnight 1/31 and noted with confusion and found to have sepsis due to GP bacteremia.   H/o PCN allergy with hives    Sepsis due to gram positive bacteremia, cleared and treated    E. faecalis bacteremia - unclear source, ?GI, less likely   - 1/30 Bcx (1 set sent) with Staph epi - MRSE -- sensitivities noted   - 1/31 Bcx (1 set sent) - aerobic bottle grew E. faecalis (amp/vanc sensitive) and MRSE  - 2/1 repeat Bcx -- Negative x2   - TTE w/o mention of vegetations  - s/p vancomycin-completed 10d course 2/10    UTI with E. 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   - CT showed s/p wedge resections in b/l upper lobes, 2 cm nodular opacity a/w staple line in RUL; multiple ill-defined b/l opacities more in RLL -unclear etiology, b/l effusions R>L   - continue on Bactrim DS 1 tablet daily for PCP ppx while on prednisone   - quantiferon indeterminate - pt on steroids which can cause indeterminate results; was negative Nov 2022  - rheum note reviewed - family will defer further treatment opt for palliative eval     Fatigue/dyspnea likely due to severe anemia  Newly diagnosed MDS s/p BMbx 1/23  H/o DLBCL, EBV+  L epistaxis, resolved   Thrombocytopenia    - Heme/Onc following - s/p chemotherapy with decitabine x5d on 2/10-2/14)  mucocutaneous hsv  on IV acyclovir 5mg/kg Q8h  supportive care   d/w nursing

## 2023-02-19 NOTE — PROGRESS NOTE ADULT - SUBJECTIVE AND OBJECTIVE BOX
[facility]   MD RONEL FRIAS MD RUORU WONG, PA    TEL:  FROM 9 AM to 5 PM ---OFFICE: [phone]    FROM 5 PM - 9 AM PLEASE CALL ANSWERING SERVICE: [phone]    RENAL FOLLOW UP NOTE--Date of Service 02-19-23 @ 08:02  --------------------------------------------------------------------------------  HPI:      Pt seen and examined at bedside.       PAST HISTORY  --------------------------------------------------------------------------------  No significant changes to PMH, PSH, FHx, SHx, unless otherwise noted    ALLERGIES & MEDICATIONS  --------------------------------------------------------------------------------  Allergies    codeine (Nausea)  doxycycline (Nausea)  penicillin (Hives)    Intolerances      Standing Inpatient Medications  acyclovir IVPB 260 milliGRAM(s) IV Intermittent every 8 hours  Biotene Dry Mouth Oral Rinse 15 milliLiter(s) Swish and Spit four times a day  budesonide 160 MICROgram(s)/formoterol 4.5 MICROgram(s) Inhaler 2 Puff(s) Inhalation two times a day  chlorhexidine 2% Cloths 1 Application(s) Topical daily  cholecalciferol 2000 Unit(s) Oral daily  citalopram 10 milliGRAM(s) Oral daily  FIRST- Mouthwash  BLM 10 milliLiter(s) Swish and Spit four times a day  fluticasone propionate 50 MICROgram(s)/spray Nasal Spray 1 Spray(s) Both Nostrils two times a day  folic acid 1 milliGRAM(s) Oral daily  furosemide   Injectable 20 milliGRAM(s) IV Push daily  guaiFENesin  milliGRAM(s) Oral every 12 hours  influenza  Vaccine (HIGH DOSE) 0.7 milliLiter(s) IntraMuscular once  ipratropium    for Nebulization 500 MICROGram(s) Nebulizer every 6 hours  lactobacillus acidophilus 1 Tablet(s) Oral daily  metoprolol tartrate 25 milliGRAM(s) Oral two times a day  mirtazapine 7.5 milliGRAM(s) Oral daily  pantoprazole    Tablet 40 milliGRAM(s) Oral before breakfast  predniSONE   Tablet 20 milliGRAM(s) Oral daily  senna 1 Tablet(s) Oral daily  sodium chloride 0.65% Nasal 2 Spray(s) Both Nostrils every 6 hours  trimethoprim  160 mG/sulfamethoxazole 800 mG 1 Tablet(s) Oral daily    PRN Inpatient Medications  acetaminophen     Tablet .. 650 milliGRAM(s) Oral every 6 hours PRN  aluminum hydroxide/magnesium hydroxide/simethicone Suspension 30 milliLiter(s) Oral every 4 hours PRN  benzocaine/menthol Lozenge 1 Lozenge Oral every 8 hours PRN  melatonin 3 milliGRAM(s) Oral at bedtime PRN  ondansetron Injectable 4 milliGRAM(s) IV Push every 8 hours PRN  polyethylene glycol 3350 17 Gram(s) Oral daily PRN      REVIEW OF SYSTEMS  --------------------------------------------------------------------------------  General: no fever    MSK: no edema     VITALS/PHYSICAL EXAM  --------------------------------------------------------------------------------  T(C): 36.5 (02-18-23 @ 09:10), Max: 36.5 (02-18-23 @ 09:10)  HR: 97 (02-18-23 @ 09:10) (92 - 97)  BP: 123/77 (02-18-23 @ 09:10) (120/73 - 123/77)  RR: 18 (02-18-23 @ 09:10) (18 - 18)  SpO2: 99% (02-18-23 @ 09:10) (95% - 99%)  Wt(kg): --        02-18-23 @ 07:01  -  02-19-23 @ 07:00  --------------------------------------------------------  IN: 0 mL / OUT: 50 mL / NET: -50 mL      Physical Exam:  	Gen: NAD  	HEENT: MMM  	Pulm: CTA B/L  	CV: S1S2  	Abd: Soft, +BS  	Ext: No LE edema B/L                      Neuro: Awake   	Skin: Warm and Dry   	Vascular access: NO HD catheter            no  john  LABS/STUDIES  --------------------------------------------------------------------------------              8.3    22.30 >-----------<  79       [02-18-23 @ 10:18]              23.7     144  |  106  |  33  ----------------------------<  107      [02-18-23 @ 07:06]  3.1   |  23  |  0.58        Ca     7.7     [02-18-23 @ 07:06]      Mg     1.6     [02-18-23 @ 07:06]      Phos  2.8     [02-18-23 @ 07:06]    TPro  5.3  /  Alb  2.9  /  TBili  2.0  /  DBili  0.9  /  AST  27  /  ALT  5   /  AlkPhos  93  [02-18-23 @ 07:06]        Uric acid 5.7      [02-18-23 @ 07:06]  LDH 1014      [02-18-23 @ 07:06]    Creatinine Trend:  SCr 0.58 [02-18 @ 07:06]  SCr 0.72 [02-17 @ 06:34]  SCr 0.53 [02-16 @ 04:09]  SCr 0.65 [02-15 @ 07:42]  SCr 0.80 [02-14 @ 06:38]    Urinalysis - [02-01-23 @ 09:57]      Color Yellow / Appearance Slightly Turbid / SG 1.033 / pH 7.0      Gluc Negative / Ketone Trace  / Bili Negative / Urobili Negative       Blood Small / Protein 300 mg/dL / Leuk Est Negative / Nitrite Negative      RBC 4 /  / Hyaline 4 / Gran  / Sq Epi  / Non Sq Epi 10 / Bacteria Moderate      Iron 152, TIBC 180, %sat 84      [01-19-23 @ 11:19]  Ferritin 5768      [01-19-23 @ 11:19]  Vitamin D (25OH) 28.1      [01-19-23 @ 11:19]  Lipid: chol 84, TG 81, HDL 22, LDL --      [10-01-22 @ 07:10]    HBsAg Nonreact      [01-25-23 @ 07:18]  HBcAb Nonreact      [01-25-23 @ 07:18]  HCV 0.15, Nonreact      [01-25-23 @ 07:18]    MPO-ANCA <5.0, interpretation: Negative      [02-02-23 @ 07:22]  PR3-ANCA <5.0, interpretation: Negative      [02-02-23 @ 07:22]  Free Light Chains: kappa 3.32, lambda 2.61, ratio = 1.27      [02-17 @ 06:35]

## 2023-02-19 NOTE — PROGRESS NOTE ADULT - SUBJECTIVE AND OBJECTIVE BOX
CARDIOLOGY FOLLOW UP NOTE - DR. CRUZ    Patient Name: BETTIE NGUYEN  Date of Service: 02-19-23 @ 14:13    events noted  fam req comfort care/pall eval       Subjective:    cv: denies chest pain, + dyspnea, palpitations, dizziness  pulmonary: denies cough  GI: denies abdominal pain, nausea, vomiting  vascular/legs: no edema   skin: no rash  ROS: otherwise negative   overnight events:      PHYSICAL EXAM:  T(C): --  HR: --  BP: --  RR: --  SpO2: --  Wt(kg): --  I&O's Summary    18 Feb 2023 07:01  -  19 Feb 2023 07:00  --------------------------------------------------------  IN: 0 mL / OUT: 50 mL / NET: -50 mL      Daily     Daily     Appearance: Normal	  Cardiovascular: Normal S1 S2,RRR, No JVD, No murmurs  Respiratory: Lungs clear to auscultation	  Gastrointestinal:  Soft, Non-tender, + BS	  Extremities: Normal range of motion, No clubbing, cyanosis or edema      Home Medications:  acetaminophen 325 mg oral tablet: 2 tab(s) orally every 6 hours, As needed, Mild Pain (1 - 3) (19 Jan 2023 09:26)  Atrovent HFA 17 mcg/inh inhalation aerosol: 2 puff(s) inhaled every 6 hours, As Needed (19 Jan 2023 09:26)  citalopram 10 mg oral tablet: 1 tab(s) orally once a day (19 Jan 2023 09:26)  enoxaparin: 40 milligram(s) subcutaneous once a day (19 Jan 2023 09:26)  ezetimibe 10 mg oral tablet: 1 tab(s) orally once a day (19 Jan 2023 09:26)  folic acid 1 mg oral tablet: 1 tab(s) orally once a day (19 Jan 2023 09:26)  guaiFENesin 600 mg oral tablet, extended release: 1 tab(s) orally every 12 hours (19 Jan 2023 09:26)  loperamide 2 mg oral capsule: 1 cap(s) orally every 4 hours for 48hrs, As Needed (19 Jan 2023 09:26)  melatonin 3 mg oral tablet: 1 tab(s) orally once a day (at bedtime) (19 Jan 2023 09:26)  metoprolol tartrate 25 mg oral tablet: 1 tab(s) orally 2 times a day (19 Jan 2023 09:26)  mirtazapine 7.5 mg oral tablet: 1 tab(s) orally once a day (at bedtime) (19 Jan 2023 09:26)  pantoprazole 40 mg oral delayed release tablet: 1 tab(s) orally once a day (19 Jan 2023 09:26)  polyethylene glycol 3350 oral powder for reconstitution: 17 gram(s) orally once a day (19 Jan 2023 09:26)  predniSONE 20 mg oral tablet: 1 tab(s) orally once a day (19 Jan 2023 09:26)  Robitussin Cough + Chest Congestion DM: 10 milliliter(s) orally 8 times a day, As Needed (19 Jan 2023 09:26)  sodium bicarbonate 650 mg oral tablet: 1 tab(s) orally 2 times a day (19 Jan 2023 09:26)  vancomycin 250 mg/5 mL oral liquid: 5 milliliter(s) orally every 6 hours for 7 days (19 Jan 2023 09:26)  Xopenex HFA 45 mcg/inh inhalation aerosol: 2 puff(s) inhaled every 6 hours (19 Jan 2023 09:26)      MEDICATIONS  (STANDING):  acyclovir IVPB 260 milliGRAM(s) IV Intermittent every 8 hours  Biotene Dry Mouth Oral Rinse 15 milliLiter(s) Swish and Spit four times a day  budesonide 160 MICROgram(s)/formoterol 4.5 MICROgram(s) Inhaler 2 Puff(s) Inhalation two times a day  chlorhexidine 2% Cloths 1 Application(s) Topical daily  cholecalciferol 2000 Unit(s) Oral daily  citalopram 10 milliGRAM(s) Oral daily  FIRST- Mouthwash  BLM 10 milliLiter(s) Swish and Spit four times a day  fluticasone propionate 50 MICROgram(s)/spray Nasal Spray 1 Spray(s) Both Nostrils two times a day  folic acid 1 milliGRAM(s) Oral daily  guaiFENesin  milliGRAM(s) Oral every 12 hours  influenza  Vaccine (HIGH DOSE) 0.7 milliLiter(s) IntraMuscular once  ipratropium    for Nebulization 500 MICROGram(s) Nebulizer every 6 hours  lactobacillus acidophilus 1 Tablet(s) Oral daily  metoprolol tartrate 25 milliGRAM(s) Oral two times a day  mirtazapine 7.5 milliGRAM(s) Oral daily  pantoprazole    Tablet 40 milliGRAM(s) Oral before breakfast  predniSONE   Tablet 20 milliGRAM(s) Oral daily  senna 1 Tablet(s) Oral daily  sodium chloride 0.65% Nasal 2 Spray(s) Both Nostrils every 6 hours  trimethoprim  160 mG/sulfamethoxazole 800 mG 1 Tablet(s) Oral daily      TELEMETRY: 	    ECG:  	  RADIOLOGY:   DIAGNOSTIC TESTING:  [ ] Echocardiogram:  [ ] Catheterization:  [ ] Stress Test:    OTHER: 	    LABS:	 	    CARDIAC MARKERS:                                      8.3    22.30 )-----------( 79       ( 18 Feb 2023 10:18 )             23.7     02-18    144  |  106  |  33<H>  ----------------------------<  107<H>  3.1<L>   |  23  |  0.58    Ca    7.7<L>      18 Feb 2023 07:06  Phos  2.8     02-18  Mg     1.6     02-18    TPro  5.3<L>  /  Alb  2.9<L>  /  TBili  2.0<H>  /  DBili  0.9<H>  /  AST  27  /  ALT  5<L>  /  AlkPhos  93  02-18    proBNP:     Lipid Profile:   HgA1c:     Creatinine, Serum: 0.58 mg/dL (02-18-23 @ 07:06)  Creatinine, Serum: 0.72 mg/dL (02-17-23 @ 06:34)

## 2023-02-19 NOTE — PROGRESS NOTE ADULT - ASSESSMENT
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 She presented here from [facility] for elevated WBC and low hemoglobin, severe weakness.    #Sinus Tachycardia  -Likely in setting of anemia, overall medical condition, mds, vasculitis  -Continue metoprolol 25mg  -Recent echo with nml lv fxn, mod MS  -Transfusions prn per primary  -Repeat duplex to r/o dvt    #Anemia  -2/2 MDS  -Transfuse prn  -Mgmt per primary, onc    #MDS  -Actively undergoing chemotherapy  -Mgmt per heme/onc    #Pulmonary Vasculitis  -Pulm following  -Cont steroids per pulm    #Hypokalemia  -Replete per primary    #Dyspnea  -multifactorial   -iv lasix per primary team   -patient clinically appears intravascularly depleted   -monitor bmp    35 minutes spent on total encounter; more than 50% of the visit was spent counseling and/or coordinating care by the attending physician.      goc noted  fam req pall/comfort care    please call back with any cardiac questions/concerns

## 2023-02-19 NOTE — PROGRESS NOTE ADULT - ASSESSMENT
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 She presented here from [facility] for elevated WBC and low hemoglobin, severe weakness. Pt states for the past 2-3 days has been having increased weakness and lethargy, decreased appetite. +sputum productive cough. + cui, no sob, no difficulty breathing. No abdominal pain, nausea, vomiting, no bloody stools. Has endorsed multiple episodes of loose stools x weeks, currently being treated for c.diff with oral vancomycin. Nephrology consulted for Hyponatremia.       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

## 2023-02-19 NOTE — PROGRESS NOTE ADULT - ASSESSMENT
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 She presented here from [facility] for elevated WBC and low hemoglobin, severe weakness. Pt states for the past 2-3 days has been having increased weakness and lethargy, decreased appetite. +sputum productive cough. + cui, no sob, no difficulty breathing. No abdominal pain, nausea, vomiting, no bloody stools. Has endorsed multiple episodes of loose stools x weeks, currently being treated for c.diff with oral vancomycin.     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 no BRBPR. occult stool neg.   - she tends to require IV Lasix intermittently post transfusion.  - doubt GI bleed   - Physical therapy. Out of bed to chair with assistance.     failure to thrive  - appears weak, fatigue  requiring numerous plts and prbc transfusions  CT chest worsening  ID / pulm/ rheum following  Lasix IV ordered since she received numerous transfusions.  acyclovir started for HSV1    Diarrhea, unspecified: resolved.   - elevated WBC  - no documented c.diff PCR, re-ordered.  - s/p Vanco PO a few days (started in rehab)  - diarrhea completely resolved.   - monitor off PO vanco per ID  - now constipated. PRN bowel regimen started. +BM    Fever, resolved.   - RVP 1/30/23 (-)  - monitor blood cxs 1/30/23  - started empirically on cefepime 1/30/23, switched to merrem 1/31/23 --> 2/3/23  - enterrocus bacteremia, abx per ID. on IV Vanco, last day was 2/10/23  - Macrobid x 5 days course added for VRE in urine, last day was 2/8/23  - blood cultures now negative     AMS  - unclear etiology, likely metabolic encephalopathy from ?Cefepime? received 3 doses, last dose 1/31/23  - CT head neg. sugars stable  - close monitoring   - monitor glucose (glucose 65 on BMP, but 95 on fingersticks)  - encourage PO intake   - mental status improved    Pulmonary vasculitis   - Recent TTE on 11/3/22 reviewed: normal LV. outpt card Dr. Ady Cooper  - outpt pulm Mack Tucker   - s/p thoracoscopic biopsy of mediastinal lymph node and Lung wedge resection 11/10/22  - recent pleural effusion s/p 650 cc U/S-guided rt thoracentesis 11/17/22  - exudative but no neutrophilic predominance and initial Gram stain w/o organisms  - cultures neg.   - path results favoring vasculitis: no evidence for lymphoma. Cytology also came back negative.   - comfortable on nasal canula, better post diuretic last admission.   - rheum and heme-onc following previously, will reconsult.   - last admission, she was not started on immunosuppressants such as cellcept given recent treatment for COVID19 at that time  - c/w prednisone 20 mg qdaily.   - RTX under consideration after administration of dacogen --> acute hepatitis panel (-) and quantiferon indeterminate  - ID started PCP ppx with Bactrim.     hx of Tachycardia    - cont low-dose metoprolol, 50 mg to 25 mg dose reduced in rehab.   - card Dr. Hooker    Anxiety and depression  - stable, continue citalopram and Remeron.  - Pt denied SI/HI ideations, denied visual and auditory hallucinations.     GERD (gastroesophageal reflux disease)  - continue PPI    Hyperlipidemia.   - continue home ezetimibe. okay to hold if nonformulary     Hyponatremia 127 (hypovolemic?)  - renal on the case, resolved.   - s/p 3 days of IV lasix, now completed. electrolytes supplemented.  - O2 weaned off from ventimask to nascal canula to room air.     DVT ppx   - holding AC in the setting of anemia, pancytopenia.   - venodyne boots LEs

## 2023-02-19 NOTE — PROGRESS NOTE ADULT - NSPROGADDITIONALINFOA_GEN_ALL_CORE
Still requiring frequent transfusions. overall critically ill. Prognosis is poor.    she started having pain overnight  (lips and generalize and in her legs)  tylenol didn't help, IV Diladid helps. ordered  Pt and the two daughters requesting comfort care.  Pt states She can no longer proceed, and want to be at peace.   denied all blood draws and further transfusions.  she is requesting comfort care.  Palliative consult placed.     - Dr. MARY Arias ([facility])  - [phone]

## 2023-02-19 NOTE — PROGRESS NOTE ADULT - SUBJECTIVE AND OBJECTIVE BOX
SUBJECTIVE/ OVERNIGHT EVENTS:  she started having pain overnight  (lips and generalize and in her legs)  tylenol didnt help  IV Diladid helps.  Pt and the two daughters requesting comfort care.  Pt states She can no longer proceed, and want to be at peace.   denied all blood draws and further transfusions.  she is requesting comfort care.      --------------------------------------------------------------------------------------------  LABS:                        8.3    22.30 )-----------( 79       ( 18 Feb 2023 10:18 )             23.7             CAPILLARY BLOOD GLUCOSE                RADIOLOGY & ADDITIONAL TESTS:    Imaging Personally Reviewed:  [x] YES  [ ] NO    Consultant(s) Notes Reviewed:  [x] YES  [ ] NO    MEDICATIONS  (STANDING):  acyclovir IVPB 260 milliGRAM(s) IV Intermittent every 8 hours  Biotene Dry Mouth Oral Rinse 15 milliLiter(s) Swish and Spit four times a day  budesonide 160 MICROgram(s)/formoterol 4.5 MICROgram(s) Inhaler 2 Puff(s) Inhalation two times a day  chlorhexidine 2% Cloths 1 Application(s) Topical daily  cholecalciferol 2000 Unit(s) Oral daily  citalopram 10 milliGRAM(s) Oral daily  FIRST- Mouthwash  BLM 10 milliLiter(s) Swish and Spit four times a day  fluticasone propionate 50 MICROgram(s)/spray Nasal Spray 1 Spray(s) Both Nostrils two times a day  folic acid 1 milliGRAM(s) Oral daily  guaiFENesin  milliGRAM(s) Oral every 12 hours  influenza  Vaccine (HIGH DOSE) 0.7 milliLiter(s) IntraMuscular once  ipratropium    for Nebulization 500 MICROGram(s) Nebulizer every 6 hours  lactobacillus acidophilus 1 Tablet(s) Oral daily  metoprolol tartrate 25 milliGRAM(s) Oral two times a day  mirtazapine 7.5 milliGRAM(s) Oral daily  pantoprazole    Tablet 40 milliGRAM(s) Oral before breakfast  predniSONE   Tablet 20 milliGRAM(s) Oral daily  senna 1 Tablet(s) Oral daily  sodium chloride 0.65% Nasal 2 Spray(s) Both Nostrils every 6 hours  trimethoprim  160 mG/sulfamethoxazole 800 mG 1 Tablet(s) Oral daily    MEDICATIONS  (PRN):  acetaminophen     Tablet .. 650 milliGRAM(s) Oral every 6 hours PRN Temp greater or equal to 38C (100.4F), Mild Pain (1 - 3)  aluminum hydroxide/magnesium hydroxide/simethicone Suspension 30 milliLiter(s) Oral every 4 hours PRN Dyspepsia  benzocaine/menthol Lozenge 1 Lozenge Oral every 8 hours PRN Sore Throat  HYDROmorphone  Injectable 0.5 milliGRAM(s) IV Push every 4 hours PRN Severe Pain (7 - 10)  HYDROmorphone  Injectable 0.25 milliGRAM(s) IV Push every 2 hours PRN Moderate Pain (4 - 6)  melatonin 3 milliGRAM(s) Oral at bedtime PRN Insomnia  ondansetron Injectable 4 milliGRAM(s) IV Push every 8 hours PRN Nausea and/or Vomiting  polyethylene glycol 3350 17 Gram(s) Oral daily PRN Constipation      Care Discussed with Consultants/Other Providers [x] YES  [ ] NO    Vital Signs Last 24 Hrs  T(C): 36.7 (20 Feb 2023 05:22), Max: 36.7 (20 Feb 2023 05:22)  T(F): 98 (20 Feb 2023 05:22), Max: 98 (20 Feb 2023 05:22)  HR: 118 (20 Feb 2023 05:22) (118 - 118)  BP: 129/77 (20 Feb 2023 05:22) (129/77 - 129/77)  BP(mean): --  RR: 18 (20 Feb 2023 05:22) (18 - 18)  SpO2: 98% (20 Feb 2023 05:22) (98% - 98%)    Parameters below as of 20 Feb 2023 05:22  Patient On (Oxygen Delivery Method): nasal cannula  O2 Flow (L/min): 3    I&O's Summary    19 Feb 2023 07:01  -  20 Feb 2023 07:00  --------------------------------------------------------  IN: 100 mL / OUT: 0 mL / NET: 100 mL        PHYSICAL EXAM:  GENERAL: NAD, thin pale elderly, fatigue, comfortable on nasal canula, lip lesion, dry blood  HEAD:  Atraumatic, Normocephalic  EYES: EOMI, PERRLA, conjunctiva and sclera clear  NECK: Supple, No JVD  CHEST/LUNG: mild decrease breath sounds bilaterally; No wheeze   HEART: Regular rate and rhythm; No murmurs, rubs, or gallops  ABDOMEN: Soft, Nontender, Nondistended; Bowel sounds present  Neuro: awake alert, back to baseline mental status. no focal weakness  EXTREMITIES:  2+ Peripheral Pulses, No clubbing, cyanosis, trace b/l edema  SKIN: superficial ecchymoses.  lower lip hematoma/lip dry blood, no active bleeding

## 2023-02-20 NOTE — PROGRESS NOTE ADULT - SUBJECTIVE AND OBJECTIVE BOX
SUBJECTIVE/ OVERNIGHT EVENTS:  seen and examined earlier today  pt awake, comfortable  she answer some, but not all questions.  the two daughters: Kathleen and Leeann at bedside.  Pt's best friend also at bedside.  Palliative evaluating for PCU transfer.  changing PRN dilaudid to ATC.  currently pt denied pain, however, does report generalized and intermittent pain all over her body.      --------------------------------------------------------------------------------------------  LABS:            CAPILLARY BLOOD GLUCOSE                RADIOLOGY & ADDITIONAL TESTS:    Imaging Personally Reviewed:  [x] YES  [ ] NO    Consultant(s) Notes Reviewed:  [x] YES  [ ] NO    MEDICATIONS  (STANDING):  acyclovir IVPB 260 milliGRAM(s) IV Intermittent every 8 hours  Biotene Dry Mouth Oral Rinse 15 milliLiter(s) Swish and Spit four times a day  chlorhexidine 2% Cloths 1 Application(s) Topical daily  FIRST- Mouthwash  BLM 10 milliLiter(s) Swish and Spit four times a day  HYDROmorphone  Injectable 0.5 milliGRAM(s) IV Push every 6 hours  predniSONE   Tablet 20 milliGRAM(s) Oral daily  senna 1 Tablet(s) Oral daily  sodium chloride 0.65% Nasal 2 Spray(s) Both Nostrils every 6 hours    MEDICATIONS  (PRN):  acetaminophen     Tablet .. 650 milliGRAM(s) Oral every 6 hours PRN Temp greater or equal to 38C (100.4F), Mild Pain (1 - 3)  benzocaine/menthol Lozenge 1 Lozenge Oral every 8 hours PRN Sore Throat  HYDROmorphone  Injectable 0.5 milliGRAM(s) IV Push every 1 hour PRN Severe Pain (7 - 10)  HYDROmorphone  Injectable 0.2 milliGRAM(s) IV Push every 1 hour PRN Moderate Pain (4 - 6)  HYDROmorphone  Injectable 0.5 milliGRAM(s) IV Push every 1 hour PRN dyspnea  LORazepam   Injectable 0.25 milliGRAM(s) IV Push every 1 hour PRN Anxiety  nystatin Powder 1 Application(s) Topical two times a day PRN skin irritation  ondansetron Injectable 4 milliGRAM(s) IV Push every 8 hours PRN Nausea and/or Vomiting  polyethylene glycol 3350 17 Gram(s) Oral daily PRN Constipation  zinc oxide 40% Paste 1 Application(s) Topical two times a day PRN skin irritation      Care Discussed with Consultants/Other Providers [x] YES  [ ] NO    Vital Signs Last 24 Hrs  T(C): 36.5 (20 Feb 2023 18:27), Max: 36.7 (20 Feb 2023 05:22)  T(F): 97.7 (20 Feb 2023 18:27), Max: 98 (20 Feb 2023 05:22)  HR: 121 (20 Feb 2023 18:27) (118 - 121)  BP: 121/54 (20 Feb 2023 18:27) (121/54 - 129/77)  BP(mean): --  RR: 18 (20 Feb 2023 18:27) (18 - 18)  SpO2: 99% (20 Feb 2023 18:27) (98% - 99%)    Parameters below as of 20 Feb 2023 18:27  Patient On (Oxygen Delivery Method): nasal cannula  O2 Flow (L/min): 3    I&O's Summary    19 Feb 2023 07:01  -  20 Feb 2023 07:00  --------------------------------------------------------  IN: 100 mL / OUT: 0 mL / NET: 100 mL        PHYSICAL EXAM:  GENERAL: NAD, thin pale elderly, fatigue, comfortable on nasal canula, lip lesion, dry blood  HEAD:  Atraumatic, Normocephalic  EYES: EOMI, PERRLA, conjunctiva and sclera clear  NECK: Supple, No JVD  CHEST/LUNG: mild decrease breath sounds bilaterally; No wheeze   HEART: Regular rate and rhythm; No murmurs, rubs, or gallops  ABDOMEN: Soft, Nontender, Nondistended; Bowel sounds present  Neuro: awake alert, back to baseline mental status. no focal weakness  EXTREMITIES:  2+ Peripheral Pulses, No clubbing, cyanosis, trace b/l edema  SKIN: superficial ecchymoses.  lower lip hematoma/lip dry blood, no active bleeding

## 2023-02-20 NOTE — PROGRESS NOTE ADULT - SUBJECTIVE AND OBJECTIVE BOX
Date of Service: 02-20-23 @ 15:10    Patient is a 82y old  Female who presents with a chief complaint of weakness (20 Feb 2023 14:27)      Any change in ROS:  doing poorly:  now comfort care:      MEDICATIONS  (STANDING):  acyclovir IVPB 260 milliGRAM(s) IV Intermittent every 8 hours  Biotene Dry Mouth Oral Rinse 15 milliLiter(s) Swish and Spit four times a day  chlorhexidine 2% Cloths 1 Application(s) Topical daily  FIRST- Mouthwash  BLM 10 milliLiter(s) Swish and Spit four times a day  HYDROmorphone  Injectable 0.5 milliGRAM(s) IV Push every 6 hours  predniSONE   Tablet 20 milliGRAM(s) Oral daily  senna 1 Tablet(s) Oral daily  sodium chloride 0.65% Nasal 2 Spray(s) Both Nostrils every 6 hours    MEDICATIONS  (PRN):  acetaminophen     Tablet .. 650 milliGRAM(s) Oral every 6 hours PRN Temp greater or equal to 38C (100.4F), Mild Pain (1 - 3)  benzocaine/menthol Lozenge 1 Lozenge Oral every 8 hours PRN Sore Throat  HYDROmorphone  Injectable 0.5 milliGRAM(s) IV Push every 1 hour PRN Severe Pain (7 - 10)  HYDROmorphone  Injectable 0.2 milliGRAM(s) IV Push every 1 hour PRN Moderate Pain (4 - 6)  HYDROmorphone  Injectable 0.5 milliGRAM(s) IV Push every 1 hour PRN dyspnea  LORazepam   Injectable 0.25 milliGRAM(s) IV Push every 1 hour PRN Anxiety  ondansetron Injectable 4 milliGRAM(s) IV Push every 8 hours PRN Nausea and/or Vomiting  polyethylene glycol 3350 17 Gram(s) Oral daily PRN Constipation    Vital Signs Last 24 Hrs  T(C): 36.7 (20 Feb 2023 05:22), Max: 36.7 (20 Feb 2023 05:22)  T(F): 98 (20 Feb 2023 05:22), Max: 98 (20 Feb 2023 05:22)  HR: 118 (20 Feb 2023 05:22) (118 - 118)  BP: 129/77 (20 Feb 2023 05:22) (129/77 - 129/77)  BP(mean): --  RR: 18 (20 Feb 2023 05:22) (18 - 18)  SpO2: 98% (20 Feb 2023 05:22) (98% - 98%)    Parameters below as of 20 Feb 2023 05:22  Patient On (Oxygen Delivery Method): nasal cannula  O2 Flow (L/min): 3      I&O's Summary    19 Feb 2023 07:01  -  20 Feb 2023 07:00  --------------------------------------------------------  IN: 100 mL / OUT: 0 mL / NET: 100 mL          Physical Exam:   GENERAL: NAD, well-groomed, well-developed  HEENT: RANJIT/  herpetic lesions on mouth   ENMT: No tonsillar erythema, exudates, or enlargement; Moist mucous membranes, Good dentition, No lesions  NECK: Supple, No JVD, Normal thyroid  CHEST/LUNG: Clear to auscultaion  CVS: Regular rate and rhythm; No murmurs, rubs, or gallops  GI: : Soft, Nontender, Nondistended; Bowel sounds present  NERVOUS SYSTEM:  Alert & Oriented X3  EXTREMITIES:  2+ Peripheral Pulses, No clubbing, cyanosis, or edema  LYMPH: No lymphadenopathy noted  SKIN: No rashes or lesions  ENDOCRINOLOGY: No Thyromegaly  PSYCH: Appropriate    Labs:                              8.3    22.30 )-----------( 79       ( 18 Feb 2023 10:18 )             23.7                         9.7    19.05 )-----------( 4        ( 18 Feb 2023 07:09 )             27.1                         9.3    28.99 )-----------( 21       ( 17 Feb 2023 17:47 )             26.3                         6.3    28.48 )-----------( 84       ( 17 Feb 2023 12:25 )             18.4                         7.4    29.33 )-----------( 10       ( 17 Feb 2023 07:40 )             21.7                         7.5    30.15 )-----------( 11       ( 17 Feb 2023 07:14 )             22.1                         6.2    35.77 )-----------( 8        ( 16 Feb 2023 17:54 )             18.3     02-18    144  |  106  |  33<H>  ----------------------------<  107<H>  3.1<L>   |  23  |  0.58  02-17    142  |  105  |  34<H>  ----------------------------<  120<H>  3.3<L>   |  22  |  0.72      TPro  5.3<L>  /  Alb  2.9<L>  /  TBili  2.0<H>  /  DBili  0.9<H>  /  AST  27  /  ALT  5<L>  /  AlkPhos  93  02-18  TPro  5.4<L>  /  Alb  2.9<L>  /  TBili  2.3<H>  /  DBili  x   /  AST  30  /  ALT  8<L>  /  AlkPhos  106  02-17    CAPILLARY BLOOD GLUCOSE        < from: CT Chest No Cont (02.17.23 @ 11:12) >  pneumothorax. Bibasilar areas of atelectasis.    UPPER ABDOMEN: Large hiatal hernia.    BONES/SOFT TISSUES: Left chest wall MediPort. Chronic T11 vertebral body   compression deformity. Degenerative changes of the spine.    IMPRESSION:    Multiple bilateral nodular and opacities many of which have increased in   size or are new since prior study on 1/18/2023. Findings are concerning   for worsening infection although superimposed progression of vasculitis   can have a similar appearance. Short interval follow-up CT scan in one   month is recommended. Mild interval improvement of the right lung base   patchy opacities as compared with January 18, 2023.    Nodular opacity in the right upper lobe along the suture line mildly     < end of copied text >                RECENT CULTURES:        RESPIRATORY CULTURES:          Studies  Chest X-RAY  CT SCAN Chest   Venous Dopplers: LE:   CT Abdomen  Others

## 2023-02-20 NOTE — CONSULT NOTE ADULT - CONSULT REQUESTED BY NAME
Dr. Arias
IM
Dr Arias
Dr. Arias
Dr. Arias
Casa Arias (DO)
Dr NEGRITA beaulieu
Medicine.
primary team

## 2023-02-20 NOTE — CONSULT NOTE ADULT - PROBLEM SELECTOR RECOMMENDATION 9
Pt received Dilaudid 0.5mg IV x5 and Dilaudid 0.25mg IV x1 in the last 24 hours  Start Dilaudid 0.5mg IV q6h ATC  Start Dilaudid 0.2-0.5mg IV q1h PRN mod-severe pain  Bowel regimen while on opioids

## 2023-02-20 NOTE — CONSULT NOTE ADULT - PROBLEM SELECTOR RECOMMENDATION 6
- Introduced [facility] team to pt and family who want pt to be comfortable and agreed to transfer to the PCU. Family agreed to discontinuing nonessential meds, [id number], and aware of ongoing discussions to be had with the team for possible transfer to inpatient hospice facility pending Rosalba's clinical status.  Comfort measures:  - Start Ativan 0.25mg IV q1h PRN anxiety

## 2023-02-20 NOTE — PROGRESS NOTE ADULT - ASSESSMENT
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 Reports chronic cough, currently nonproductive - RVP/COVID negative. Has multiple episodes of loose stools x weeks, reported recently trying marijuana for appetite and noted worsening. She was given oral vancomycin empirically for C.diff but then became constipated, s/p bowel regimen and having normal bowel movements. Patient initially being monitored off antibiotics given she was afebrile, WBC was stable and no infectious source was found. She had a fever 100.9F on 1/30 overnight with worsening leukocytosis and then 101.4F overnight 1/31 and noted with confusion and found to have sepsis due to GP bacteremia.   H/o PCN allergy with hives    Sepsis due to gram positive bacteremia, cleared and treated    E. faecalis bacteremia - unclear source, ?GI, less likely   - 1/30 Bcx (1 set sent) with Staph epi - MRSE -- sensitivities noted   - 1/31 Bcx (1 set sent) - aerobic bottle grew E. faecalis (amp/vanc sensitive) and MRSE  - 2/1 repeat Bcx -- Negative x2   - TTE w/o mention of vegetations  - s/p vancomycin-completed 10d course 2/10    UTI with E. 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   - CT showed s/p wedge resections in b/l upper lobes, 2 cm nodular opacity a/w staple line in RUL; multiple ill-defined b/l opacities more in RLL -unclear etiology, b/l effusions R>L   - continue on Bactrim DS 1 tablet daily for PCP ppx while on prednisone   - quantiferon indeterminate - pt on steroids which can cause indeterminate results; was negative Nov 2022  - repeat RVP 2/18 negative   - rheum note reviewed - family and patient defer further treatment and opt for palliative care eval    Fatigue/dyspnea likely due to severe anemia  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  [facility], Division of Infectious Diseases  [phone]  After 5pm on weekdays and all day on weekends - please call [phone]

## 2023-02-20 NOTE — CONSULT NOTE ADULT - PROVIDER SPECIALTY LIST ADULT
Rheumatology
Pulmonology
Cardiology
ENT
Heme/Onc
Nephrology
Neurology
Infectious Disease
Palliative Care

## 2023-02-20 NOTE — CONSULT NOTE ADULT - ATTENDING COMMENTS
Patient with NHL (DLBCL) s/p chemo who has been in remission.    Leukocytosis may be related to infection.    Anemia and thrombocytopenia of unclear etiology:  Check flow cytometry of peripheral blood.  For possible BM Bx on Monday.      Patrick Nice MD (Weekend Coverage)
Amendments to fellow's A/P as above  complex history and current presentation confounded by recent C.Diff, respiratory status with hypoxia and new lung findings on CT, severe leukocytosis and anemia/thrombocytopenia  she had evidence of pulmonary vasculitis on biopsy and now with left foot drop (unclear chronicity), she did not receive treatment for the vasculitis (besides the low dose prednisone), therefore would require induction, would favor RTX in her case. would need to discuss with heme, would hold off until BM bx completed. this degree of cytopenias would not be explained by her pulm vasculitis. also with recent C,diff would be concerned with immediate immunosuppression, will discuss with ID  should be on PCP ppx    DW primary team  will follow closely
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 She presented here from [facility] for elevated WBC and low hemoglobin, severe weakness. Pt states for the past 2-3 days has been having increased weakness and lethargy, decreased appetite. +sputum productive cough. + cui, no sob, no difficulty breathing. Palliative Care Consulted for complex symptom management and decision making in the setting of advanced illness.  Symptoms as above  Goal si comfort and aggressive symptom management  Daughters at bedside  Pt appears comfortable with current regimen  Plan is for pt to be on the list for transfer to PCU once bed is available

## 2023-02-20 NOTE — PROGRESS NOTE ADULT - SUBJECTIVE AND OBJECTIVE BOX
[facility]   MD RONEL FRIAS MD, PA KRISTINE SOLTANPOUR, DO INJUNG KO, NP    TEL:  OFFICE: [phone]    From 5pm-7am Answering Service [phone]    -- RENAL FOLLOW UP NOTE ---Date of Service 02-20-23 @ 14:28    Patient is a 82y old  Female who presents with a chief complaint of weakness (20 Feb 2023 11:18)      Patient seen and examined at bedside. No chest pain/sob    VITALS:  T(F): 98 (02-20-23 @ 05:22), Max: 98 (02-20-23 @ 05:22)  HR: 118 (02-20-23 @ 05:22)  BP: 129/77 (02-20-23 @ 05:22)  RR: 18 (02-20-23 @ 05:22)  SpO2: 98% (02-20-23 @ 05:22)  Wt(kg): --    02-19 @ 07:01  -  02-20 @ 07:00  --------------------------------------------------------  IN: 100 mL / OUT: 0 mL / NET: 100 mL          PHYSICAL EXAM:  Constitutional: NAD  Neck: No JVD  Respiratory: CTAB, no wheezes, rales or rhonchi  Cardiovascular: S1, S2, RRR  Gastrointestinal: BS+, soft, NT/ND  Extremities: No peripheral edema    Hospital Medications:   MEDICATIONS  (STANDING):  acyclovir IVPB 260 milliGRAM(s) IV Intermittent every 8 hours  Biotene Dry Mouth Oral Rinse 15 milliLiter(s) Swish and Spit four times a day  chlorhexidine 2% Cloths 1 Application(s) Topical daily  FIRST- Mouthwash  BLM 10 milliLiter(s) Swish and Spit four times a day  HYDROmorphone  Injectable 0.5 milliGRAM(s) IV Push every 6 hours  predniSONE   Tablet 20 milliGRAM(s) Oral daily  senna 1 Tablet(s) Oral daily  sodium chloride 0.65% Nasal 2 Spray(s) Both Nostrils every 6 hours      LABS:        Creatinine Trend: 0.58 <--, 0.72 <--, 0.53 <--, 0.65 <--, 0.80 <--            Urine Studies:  Urinalysis - [02-01-23 @ 09:57]      Color Yellow / Appearance Slightly Turbid / SG 1.033 / pH 7.0      Gluc Negative / Ketone Trace  / Bili Negative / Urobili Negative       Blood Small / Protein 300 mg/dL / Leuk Est Negative / Nitrite Negative      RBC 4 /  / Hyaline 4 / Gran  / Sq Epi  / Non Sq Epi 10 / Bacteria Moderate      Iron 152, TIBC 180, %sat 84      [01-19-23 @ 11:19]  Ferritin 5768      [01-19-23 @ 11:19]  Vitamin D (25OH) 28.1      [01-19-23 @ 11:19]  Lipid: chol 84, TG 81, HDL 22, LDL --      [10-01-22 @ 07:10]    HBsAg Nonreact      [01-25-23 @ 07:18]  HBcAb Nonreact      [01-25-23 @ 07:18]  HCV 0.15, Nonreact      [01-25-23 @ 07:18]    MPO-ANCA <5.0, interpretation: Negative      [02-02-23 @ 07:22]  PR3-ANCA <5.0, interpretation: Negative      [02-02-23 @ 07:22]  Free Light Chains: kappa 3.32, lambda 2.61, ratio = 1.27      [02-17 @ 06:35]    RADIOLOGY & ADDITIONAL STUDIES:

## 2023-02-20 NOTE — CONSULT NOTE ADULT - CONVERSATION DETAILS
Introduced [facility] team to pt and daughter Leeann at bedside. Leeann expressed she and her 4 other siblings had discussed with Rosalba that they all wanted for the pt to be comfortable. Leeann stated that they wished for no further DMT and she stated that Rosalba expressed she did not want any more treatment herself. We discussed transfer to the PCU for symptom directed care and Leeann agreed that was in Rosalba's best interest and was what all of her siblings wanted as well. We discussed discontinuing nonessential medications and q24h vitals. We also discussed that Rosalba was Judaism and that chaplaincy services would be much appreciated; Rosalba used to live in a convent. Emotional support provided.    Called pt's son Levon to discuss above and he reiterated that all the siblings were on the same page and wished for their mother to be made comfortable. He agreed with PCU transfer as well. Introduced [facility] team to pt and daughter Leeann at bedside. Leeann expressed she and her 4 other siblings had discussed with Rosalba that they all wanted for the pt to be comfortable. Leeann stated that they wished for no further DMT and she stated that Rosalba expressed she did not want any more treatment herself. We discussed transfer to the PCU for symptom directed care and Leeann agreed that was in Rosalba's best interest and was what all of her siblings wanted as well. We discussed discontinuing nonessential medications and q24h vitals. We also discussed that Rosalba was Roman Catholic and that chaplaincy services would be much appreciated; Rosalba used to live in a convent. Emotional support provided.  Called pt's son Levon to discuss above and he reiterated that all the siblings were on the same page and wished for their mother to be made comfortable. He agreed with PCU transfer as well.

## 2023-02-20 NOTE — PROGRESS NOTE ADULT - SUBJECTIVE AND OBJECTIVE BOX
[facility] DIVISION OF INFECTIOUS DISEASES  ANDREW Rodríguez Y. Patel, S. Shah, G. Casimir  [phone]  ([phone] - weekdays after 5pm and weekends)    Name: BETTIE NGUYEN  Age/Gender: 82y Female  MRN: [id number]    Interval History:  Patient seen and examined this morning.   Patient resting, no new complaints at this time.  Family member sleeping at bedside.   Notes reviewed. Afebrile   Allergies: codeine (Nausea)  doxycycline (Nausea)  penicillin (Hives)    Objective:  Vitals:   T(F): 98 (02-20-23 @ 05:22), Max: 98 (02-20-23 @ 05:22)  HR: 118 (02-20-23 @ 05:22) (118 - 118)  BP: 129/77 (02-20-23 @ 05:22) (129/77 - 129/77)  RR: 18 (02-20-23 @ 05:22) (18 - 18)  SpO2: 98% (02-20-23 @ 05:22) (98% - 98%)  Physical Examination:  General: chronically ill appearing, NAD  HEENT: NC/AT, dried blood on lips/mouth, neck supple  Respiratory: decreased breath sounds b/l  Cardiovascular: S1 and S2 present, tachycardia   Gastrointestinal: soft, nontender, nondistended  Neuro: AAOx3, no obvious focal deficits    Extremities: no edema, no cyanosis  Skin: warm, dry, no visible rash, ecchymosis   Lines: L chest port with no TTP/erythema    Laboratory Studies:  CBC:                       8.3    22.30 )-----------( 79       ( 18 Feb 2023 10:18 )             23.7     WBC Trend:  22.30 02-18-23 @ 10:18  19.05 02-18-23 @ 07:09  28.99 02-17-23 @ 17:47  28.48 02-17-23 @ 12:25  29.33 02-17-23 @ 07:40  30.15 02-17-23 @ 07:14  35.77 02-16-23 @ 17:54  31.79 02-16-23 @ 08:10  33.45 02-16-23 @ 04:14  42.40 02-15-23 @ 07:42  53.70 02-14-23 @ 06:38  61.18 02-13-23 @ 16:41  66.95 02-13-23 @ 09:22    CMP:       Creatinine, Serum: 0.58 mg/dL (02-18-23 @ 07:06)  Creatinine, Serum: 0.72 mg/dL (02-17-23 @ 06:34)  Creatinine, Serum: 0.53 mg/dL (02-16-23 @ 04:09)  Creatinine, Serum: 0.65 mg/dL (02-15-23 @ 07:42)  Creatinine, Serum: 0.80 mg/dL (02-14-23 @ 06:38)  Creatinine, Serum: 1.04 mg/dL (02-13-23 @ 09:22)    Microbiology: reviewed   Culture - Blood (collected 02-12-23 @ 17:54)  Source: .Blood Blood-Peripheral  Final Report (02-17-23 @ 22:00):    No Growth Final    Culture - Blood (collected 02-12-23 @ 17:26)  Source: .Blood Blood-Peripheral  Final Report (02-17-23 @ 22:00):    No Growth Final    Radiology: reviewed     Medications:  acetaminophen     Tablet .. 650 milliGRAM(s) Oral every 6 hours PRN  acyclovir IVPB 260 milliGRAM(s) IV Intermittent every 8 hours  aluminum hydroxide/magnesium hydroxide/simethicone Suspension 30 milliLiter(s) Oral every 4 hours PRN  benzocaine/menthol Lozenge 1 Lozenge Oral every 8 hours PRN  Biotene Dry Mouth Oral Rinse 15 milliLiter(s) Swish and Spit four times a day  budesonide 160 MICROgram(s)/formoterol 4.5 MICROgram(s) Inhaler 2 Puff(s) Inhalation two times a day  chlorhexidine 2% Cloths 1 Application(s) Topical daily  cholecalciferol 2000 Unit(s) Oral daily  citalopram 10 milliGRAM(s) Oral daily  FIRST- Mouthwash  BLM 10 milliLiter(s) Swish and Spit four times a day  fluticasone propionate 50 MICROgram(s)/spray Nasal Spray 1 Spray(s) Both Nostrils two times a day  folic acid 1 milliGRAM(s) Oral daily  guaiFENesin  milliGRAM(s) Oral every 12 hours  HYDROmorphone  Injectable 0.5 milliGRAM(s) IV Push every 4 hours PRN  HYDROmorphone  Injectable 0.25 milliGRAM(s) IV Push every 2 hours PRN  influenza  Vaccine (HIGH DOSE) 0.7 milliLiter(s) IntraMuscular once  ipratropium    for Nebulization 500 MICROGram(s) Nebulizer every 6 hours  lactobacillus acidophilus 1 Tablet(s) Oral daily  melatonin 3 milliGRAM(s) Oral at bedtime PRN  metoprolol tartrate 25 milliGRAM(s) Oral two times a day  mirtazapine 7.5 milliGRAM(s) Oral daily  ondansetron Injectable 4 milliGRAM(s) IV Push every 8 hours PRN  pantoprazole    Tablet 40 milliGRAM(s) Oral before breakfast  polyethylene glycol 3350 17 Gram(s) Oral daily PRN  predniSONE   Tablet 20 milliGRAM(s) Oral daily  senna 1 Tablet(s) Oral daily  sodium chloride 0.65% Nasal 2 Spray(s) Both Nostrils every 6 hours  trimethoprim  160 mG/sulfamethoxazole 800 mG 1 Tablet(s) Oral daily    Current Antimicrobials:  acyclovir IVPB 260 milliGRAM(s) IV Intermittent every 8 hours  trimethoprim  160 mG/sulfamethoxazole 800 mG 1 Tablet(s) Oral daily    Prior/Completed Antimicrobials:  cefepime   IVPB  nitrofurantoin monohydrate/macrocrystals (MACROBID)  vancomycin  IVPB

## 2023-02-20 NOTE — CONSULT NOTE ADULT - CONSULT REASON
GOC, inpatient hospice
DLBCL
Left Epistaxis.
weakness
weakness  :
Tachycardia
hyponatremia
ppx, diarrhea
Hx of vasculitis and admitted w/SOB

## 2023-02-20 NOTE — CONSULT NOTE ADULT - PROBLEM SELECTOR RECOMMENDATION 4
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

## 2023-02-20 NOTE — PROGRESS NOTE ADULT - NSPROGADDITIONALINFOA_GEN_ALL_CORE
Still requiring frequent transfusions. overall critically ill. Prognosis is poor given two diagnosis of pulmonary vasculitis as well as new dx of MDS.    she started having pain overnight over the weekend  (lips and generalize and in her legs)  tylenol didn't help, IV Diladid helps. ordered PRN given pt is opioid naieve.   Pt and the two daughters requesting comfort care.  Pt states She can no longer proceed, and want to be at peace.   denied all blood draws and further transfusions.  she is requesting comfort care.  Palliative consult placed.      Met with the two daughters: Kathleen and Leeann at bedside.  Pt's best friend also at bedside.  Palliative evaluating for PCU transfer. Accepted.   changing PRN dilaudid to ATC.  all questions answered.     - Dr. MARY Arias ([facility])  - [phone]

## 2023-02-20 NOTE — CONSULT NOTE ADULT - SUBJECTIVE AND OBJECTIVE BOX
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 She presented here from [facility] for elevated WBC and low hemoglobin, severe weakness. Pt states for the past 2-3 days has been having increased weakness and lethargy, decreased appetite. +sputum productive cough. + cui, no sob, no difficulty breathing. No abdominal pain, nausea, vomiting, no bloody stools. Has endorsed multiple episodes of loose stools x weeks, currently being treated for c.diff with oral vancomycin.      Pt was receiving tx for vascuilitis but pt and family now opt for palliative measures. GAP team was consulted for GOC, inpatient hospice discussion. Pt seen and examined at bedside this morning. Daughter Leeann was at bedside. See GOC discussion below for details. Pt was minimally verbal but reports pain "all over". Chart reviewed. Pt required Dilaudid 0.5mg IV x5 and Dilaudid 0.25mg IV x1 for pain in the last 24 hours.     PERTINENT PM/SXH:   Vasculitis    ANCA-associated vasculitis    Anxiety and depression    Pulmonary embolism    DVT, lower extremity    GERD (gastroesophageal reflux disease)    Hyperlipidemia    Lung mass    DLBCL (diffuse large B cell lymphoma)      No significant past surgical history    History of appendectomy    Lung nodule    Non-Hodgkins lymphoma      FAMILY HISTORY:  FH: lung cancer (Sibling)    FH: CAD (coronary artery disease) (Sibling)      Family Hx substance abuse [ ]yes [ ]no  ITEMS NOT CHECKED ARE NOT PRESENT    SOCIAL HISTORY:   Significant other/partner[ ]  Children[X ]  Pentecostal/Spirituality: Adventism  Substance hx:  [ ]   Tobacco hx:  [ ]   Alcohol hx: [ ]   Home Opioid hx:  [ ] I-Stop Reference No:  Living Situation: [ ]Home  [ ]Long term care  [ X]Rehab - [facility] [ ]Other    ADVANCE DIRECTIVES:    DNR/MOLST  [X ]  Living Will  [ ]   DECISION MAKER(s): herself  [ ] Health Care Proxy(s)  [ ] Surrogate(s)  [ ] Guardian           Name(s): Phone Number(s): Son Levon, Daughter Leeann    BASELINE (I)ADL(s) (prior to admission):  [place]: [ ]Total  [ ] Moderate [X ]Dependent    Allergies    codeine (Nausea)  doxycycline (Nausea)  penicillin (Hives)    Intolerances    MEDICATIONS  (STANDING):  acyclovir IVPB 260 milliGRAM(s) IV Intermittent every 8 hours  Biotene Dry Mouth Oral Rinse 15 milliLiter(s) Swish and Spit four times a day  chlorhexidine 2% Cloths 1 Application(s) Topical daily  FIRST- Mouthwash  BLM 10 milliLiter(s) Swish and Spit four times a day  HYDROmorphone  Injectable 0.5 milliGRAM(s) IV Push every 6 hours  predniSONE   Tablet 20 milliGRAM(s) Oral daily  senna 1 Tablet(s) Oral daily  sodium chloride 0.65% Nasal 2 Spray(s) Both Nostrils every 6 hours  trimethoprim  160 mG/sulfamethoxazole 800 mG 1 Tablet(s) Oral daily    MEDICATIONS  (PRN):  acetaminophen     Tablet .. 650 milliGRAM(s) Oral every 6 hours PRN Temp greater or equal to 38C (100.4F), Mild Pain (1 - 3)  benzocaine/menthol Lozenge 1 Lozenge Oral every 8 hours PRN Sore Throat  HYDROmorphone  Injectable 0.5 milliGRAM(s) IV Push every 1 hour PRN Severe Pain (7 - 10)  HYDROmorphone  Injectable 0.2 milliGRAM(s) IV Push every 1 hour PRN Moderate Pain (4 - 6)  HYDROmorphone  Injectable 0.5 milliGRAM(s) IV Push every 1 hour PRN dyspnea  LORazepam   Injectable 0.25 milliGRAM(s) IV Push every 1 hour PRN Anxiety  ondansetron Injectable 4 milliGRAM(s) IV Push every 8 hours PRN Nausea and/or Vomiting  polyethylene glycol 3350 17 Gram(s) Oral daily PRN Constipation    PRESENT SYMPTOMS: [ ]Unable to self-report  [ ] CPOT [ ] PAINADs [ ] RDOS  Source if other than patient:  [ ]Family   [ ]Team     Pain: [X ]yes [ ]no  QOL impact - severe  Location -      "all over"              Aggravating factors -  Quality - unable to report  Radiation -  Timing-  Severity (0-10 scale): unable to report  Minimal acceptable level (0-10 scale):  unable to report    CPOT:    https://www.[facility].org/getattachment/[email or web address]/Critical-Care-Pain-Observation-Tool-(CPOT)    PAIN AD Score:   http://geriatrictoolkit.[facility]/cog/painad.pdf (press ctrl +  left click to view)    Dyspnea:                           [ ]Mild [ ]Moderate [ ]Severe      RDOS:  0 to 2  minimal or no respiratory distress   3  mild distress  4 to 6 moderate distress  >7 severe distress  https://homecareinformation.net/handouts/hen/Respiratory_Distress_Observation_Scale.pdf (Ctrl +  left click to view)     Anxiety:                             [ ]Mild [ ]Moderate [ ]Severe  Fatigue:                             [ ]Mild [ ]Moderate [X ]Severe  Nausea:                             [ ]Mild [ ]Moderate [ ]Severe  Loss of appetite:              [ ]Mild [ ]Moderate [X ]Severe  Constipation:                    [ ]Mild [ ]Moderate [X ]Severe    PCSSQ[Palliative Care Spiritual Screening Question]   Severity (0-10):  Score of 4 or > indicate consideration of Chaplaincy referral.  Chaplaincy Referral: [ X] yes [ ] refused [ ] following [ ] Deferred     Caregiver [place]? : [X ] yes [ ] no [ ] Deferred [ ] Declined             Social work referral [ ] Patient & Family Centered Care Referral [ ]     Anticipatory Grief present?:  [X ] yes [ ] no  [ ] Deferred                  Social work referral [ ] Chaplaincy Referral[ ]      Other Symptoms:  [ ]All other review of systems negative - pt  minimally verbal/unable to report     Palliative Performance Status Version 2:       30  %    http://npcrc.org/files/news/palliative_performance_scale_ppsv2.pdf  PHYSICAL EXAM:  Vital Signs Last 24 Hrs  T(C): 36.7 (20 Feb 2023 05:22), Max: 36.7 (20 Feb 2023 05:22)  T(F): 98 (20 Feb 2023 05:22), Max: 98 (20 Feb 2023 05:22)  HR: 118 (20 Feb 2023 05:22) (118 - 118)  BP: 129/77 (20 Feb 2023 05:22) (129/77 - 129/77)  BP(mean): --  RR: 18 (20 Feb 2023 05:22) (18 - 18)  SpO2: 98% (20 Feb 2023 05:22) (98% - 98%)    Parameters below as of 20 Feb 2023 05:22  Patient On (Oxygen Delivery Method): nasal cannula  O2 Flow (L/min): 3   I&O's Summary    19 Feb 2023 07:01  -  20 Feb 2023 07:00  --------------------------------------------------------  IN: 100 mL / OUT: 0 mL / NET: 100 mL      GENERAL: [ ]Cachexia    [ ]Alert  [ ]Oriented x   [ X]Lethargic  [ ]Unarousable  [ X]Verbal - minimally [ ]Non-Verbal  Behavioral:   [ ] Anxiety  [X ] Delirium [ ] Agitation [ ] Other  HEENT:  [ ]Normal   [ X]Dry mouth   [ ]ET Tube/Trach  [ X]Oral lesions  PULMONARY:   [X ]Clear [ ]Tachypnea  [ ]Audible excessive secretions   [ ]Rhonchi        [ ]Right [ ]Left [ ]Bilateral  [ ]Crackles        [ ]Right [ ]Left [ ]Bilateral  [ ]Wheezing     [ ]Right [ ]Left [ ]Bilateral  [ ]Diminished breath sounds [ ]right [ ]left [ ]bilateral  CARDIOVASCULAR:    [ ]Regular [ ]Irregular [ X]Tachy  [ ]Jaime [ ]Murmur [ ]Other  GASTROINTESTINAL:  [X ]Soft  [ ]Distended   [ ]+BS  [X ]Non tender [ ]Tender  [ ]Other [ ]PEG [ ]OGT/ NGT  Last BM: 2/8 documented?  GENITOURINARY:  [ ]Normal [ X] Incontinent   [ ]Oliguria/Anuria   [ ]Stephenson  MUSCULOSKELETAL:   [ ]Normal   [ X]Weakness  [X ]Bed/Wheelchair bound [ ]Edema  NEUROLOGIC:   [ ]No focal deficits  [ X]Cognitive impairment  [ ]Dysphagia [ ]Dysarthria [ ]Paresis [ ]Other   SKIN:  see nursing assessments for details  [ ]Normal  [ ]Rash  [ ]Other  [ ]Pressure ulcer(s)       Present on admission [ ]y [ ]n    CRITICAL CARE:  [ ] Shock Present  [ ]Septic [ ]Cardiogenic [ ]Neurologic [ ]Hypovolemic  [ ]  Vasopressors [ ]  Inotropes   [ ]Respiratory failure present [ ]Mechanical ventilation [ ]Non-invasive ventilatory support [ ]High flow    [ ]Acute  [ ]Chronic [ ]Hypoxic  [ ]Hypercarbic [ ]Other  [ ]Other organ failure     LABS: reviewed    RADIOLOGY & ADDITIONAL STUDIES:    PROCEDURE DATE:  02/17/2023          INTERPRETATION:  CLINICAL INFORMATION: Follow-up multiple ill-defined   lung opacities seen on recent CT chest, as well as progression of   vasculitis. History of ANCA vasculitis and large B-cell lymphoma.    COMPARISON: CT chest abdomen pelvis 1/18/2023.    CONTRAST/COMPLICATIONS:  IV Contrast: NONE  Oral Contrast: NONE  Complications: None reported at time of study completion    PROCEDURE:  CT scan of the chest was obtained without intravenous contrast.    FINDINGS:    LYMPH NODES: Unchanged mediastinal and hilar lymphadenopathy.    HEART/VASCULATURE: Cardiomegaly. No pericardial effusion. Coronary artery   and aortic calcifications. MediPort catheter tip terminates in the right   atrium.    AIRWAYS/LUNGS/PLEURA: Patent central airways. Bilateral upper lobe wedge   resections. Previously seen nodular opacity along the right upper lobe   wedge resection suture line demonstrate mild interval increase since   prior study on 1/18/2023, currently measuring approximately 2.0 x 1.7 cm   (3-51), previously measuring 1.1 x 1.6 cm. In addition the ovoid shaped   nodular opacity containing punctate calcifications in the left upper lobe   hasalso increased in size currently measuring 1.6 x 1.1 cm (3-44),   previously 1.3 x 0.8 cm. Overall increase or progression of many of the   previously noted bilateral patchy opacities involving all lobes as   compared to prior study on 1/18/2023. Slight interval improvement of the   previously seen right lower lobe patchy opacities at the right lung base   since January 18, 2023. Mild interlobular septal thickening. Small to   moderate partially loculated right and small left pleural effusions, of   which have minimally improved with associated compressive atelectasis. No   pneumothorax. Bibasilar areas of atelectasis.    UPPER ABDOMEN: Large hiatal hernia.    BONES/SOFT TISSUES: Left chest wall MediPort. Chronic T11 vertebral body   compression deformity. Degenerative changes of the spine.    IMPRESSION:    Multiple bilateral nodular and opacities many of which have increased in   size or are new since prior study on 1/18/2023. Findings are concerning   for worsening infection although superimposed progression of vasculitis   can have a similar appearance. Short interval follow-up CT scan in one   month is recommended. Mild interval improvement of the right lung base   patchy opacities as compared with January 18, 2023.    Nodular opacity in the right upper lobe along the suture line mildly   increased in size, an indeterminate finding. Correlation with the biopsy   pathology results is recommended.    Slight improvement of the pleural effusions bilaterally. Interlobular   septalthickening suggestive of mild pulmonary edema.    --- End of Report ---      PROTEIN CALORIE MALNUTRITION PRESENT: [ ]mild [ ]moderate [ ]severe [ ]underweight [ ]morbid obesity  https://www.andeal.org/vault/2440/web/files/ONC/Table_Clinical%20Characteristics%20to%20Document%20Malnutrition-White%20JV%20et%20al%272757.pdf    Height (cm): 152.4 (01-19-23 @ 13:37), 147.3 (11-10-22 @ 02:06), 154.9 (10-04-22 @ 11:51)  Weight (kg): 52.2 (01-19-23 @ 13:37), 58.1 (11-10-22 @ 02:06), 62.6 (10-04-22 @ 12:30)  BMI (kg/m2): 22.5 (01-19-23 @ 13:37), 26.8 (11-10-22 @ 02:06), 26.1 (10-04-22 @ 12:30)    [X ]PPSV2 < or = to 30% [ ]significant weight loss  [ X]poor nutritional intake  [ ]anasarca[ ]Artificial Nutrition      Other REFERRALS:  [ ]Hospice  [ ]Child Life  [X ]Social Work  [ ]Case management [ ]Holistic Therapy     Goals of Care Document: HAbi Arias (02-01-23 @ 15:27)  Goals of Care Conversation:   Participants:  · Participants  Patient; Family  · Child(shavon)  the tran Salazar    Advance Directives:  · Does patient have Advance Directive  No  · Do you want to complete the HCP and name a [place] Care Agent  Yes  · Name of person who assisted  the son and the daughter  · Caregiver:  yes  · Name  Levon Townsend  · Phone Number  [phone]    Conversation Discussion:  · Conversation Details  The son brought in pt's living will, which states no ventilator, no CPR, no feeding tube if irreversible disease.  Advance directives discussed at bedside with pt and the son Levon. Introduced the philosophy of palliative care: quality vs. quantity of life. Goals of care discussed. All questions answered.   Pt and the son wishes requests DNR/DNI. No feeding tube.   Okay to continue with abx, IVF and chemotherapy if appropriate.   MOIST form signed.  Advance care planning time: approximately 32 mins spent discussing goals of care, prognosis, diagnosis and treatment plan, and filling out the MOLST form.   d/w NP.    Personal Advance Directives Treatment Guidelines:   Treatment Guidelines:  · Decision Maker  Patient  · Treatment Guidelines  DNR Order    Location of Discussion:   Time Spent on Advance Care Planning:  Attending or DAVID Only.     I personally spent 32 minutes on advance care planning services with the patient. This time is separate and distinct from any other care management services provided on this date.    Location of Discussion:  · Location of discussion  Face to face      Electronic Signatures:  Casa Arias)  (Signed 01-Feb-2023 15:28)  	Authored: Goals of Care Conversation, Personal Advance Directives Treatment Guidelines, Location of Discussion      Last Updated: 01-Feb-2023 15:28 by Casa Arias ()     HPI:  82 yr old female with a PMHx of diffuse large B cell lymphoma in remission, non-hodgkin's lymphoma (chemoport), depression, HLD, GERD, PE/DVT (4/2021 no longer on Eliquis), ANCA-vasculitis (20 y/a, no meds), s/p LVATS, LUIS wedge resection (2021), recent direct admit for RVATS, RUL Nodule Biopsy w/ IR marking in [facility], path favoring vasculitis, treated with Decadron, then switched to PO prednisone, went to [facility]. She presented here from [facility] for elevated WBC and low hemoglobin, severe weakness. Pt states for the past 2-3 days has been having increased weakness and lethargy, decreased appetite. +sputum productive cough. + cui, no sob, no difficulty breathing. No abdominal pain, nausea, vomiting, no bloody stools. Has endorsed multiple episodes of loose stools x weeks, currently being treated for c.diff with oral vancomycin.      Pt was receiving tx for vascuilitis but pt and family now opt for palliative measures. GAP team was consulted for GOC, inpatient hospice discussion. Pt seen and examined at bedside this morning. Daughter Leeann was at bedside. See GOC discussion below for details. Pt was minimally verbal but reports pain "all over". Chart reviewed. Pt required Dilaudid 0.5mg IV x5 and Dilaudid 0.25mg IV x1 for pain in the last 24 hours.     PERTINENT PM/SXH:   Vasculitis    ANCA-associated vasculitis    Anxiety and depression    Pulmonary embolism    DVT, lower extremity    GERD (gastroesophageal reflux disease)    Hyperlipidemia    Lung mass    DLBCL (diffuse large B cell lymphoma)    No significant past surgical history    History of appendectomy    Lung nodule    Non-Hodgkins lymphoma    FAMILY HISTORY:  FH: lung cancer (Sibling)    FH: CAD (coronary artery disease) (Sibling)    Family Hx substance abuse [ ]yes [ ]no  ITEMS NOT CHECKED ARE NOT PRESENT    SOCIAL HISTORY:   Significant other/partner[ ]  Children[X ]  Latter day/Spirituality: Pentecostal  Substance hx:  [ ]   Tobacco hx:  [ ]   Alcohol hx: [ ]   Home Opioid hx:  [ ] I-Stop Reference No:  Living Situation: [ ]Home  [ ]Long term care  [ X]Rehab - [facility] [ ]Other    ADVANCE DIRECTIVES:    DNR/MOLST  [X ]  Living Will  [ ]   DECISION MAKER(s): herself  [ ] Health Care Proxy(s)  [x ] Surrogate(s)  [ ] Guardian           Name(s): Phone Number(s): Son Levon, Daughter Leeann    BASELINE (I)ADL(s) (prior to admission):  [place]: [ ]Total  [ ] Moderate [X ]Dependent    Allergies    codeine (Nausea)  doxycycline (Nausea)  penicillin (Hives)    Intolerances    MEDICATIONS  (STANDING):  acyclovir IVPB 260 milliGRAM(s) IV Intermittent every 8 hours  Biotene Dry Mouth Oral Rinse 15 milliLiter(s) Swish and Spit four times a day  chlorhexidine 2% Cloths 1 Application(s) Topical daily  FIRST- Mouthwash  BLM 10 milliLiter(s) Swish and Spit four times a day  HYDROmorphone  Injectable 0.5 milliGRAM(s) IV Push every 6 hours  predniSONE   Tablet 20 milliGRAM(s) Oral daily  senna 1 Tablet(s) Oral daily  sodium chloride 0.65% Nasal 2 Spray(s) Both Nostrils every 6 hours  trimethoprim  160 mG/sulfamethoxazole 800 mG 1 Tablet(s) Oral daily    MEDICATIONS  (PRN):  acetaminophen     Tablet .. 650 milliGRAM(s) Oral every 6 hours PRN Temp greater or equal to 38C (100.4F), Mild Pain (1 - 3)  benzocaine/menthol Lozenge 1 Lozenge Oral every 8 hours PRN Sore Throat  HYDROmorphone  Injectable 0.5 milliGRAM(s) IV Push every 1 hour PRN Severe Pain (7 - 10)  HYDROmorphone  Injectable 0.2 milliGRAM(s) IV Push every 1 hour PRN Moderate Pain (4 - 6)  HYDROmorphone  Injectable 0.5 milliGRAM(s) IV Push every 1 hour PRN dyspnea  LORazepam   Injectable 0.25 milliGRAM(s) IV Push every 1 hour PRN Anxiety  ondansetron Injectable 4 milliGRAM(s) IV Push every 8 hours PRN Nausea and/or Vomiting  polyethylene glycol 3350 17 Gram(s) Oral daily PRN Constipation    PRESENT SYMPTOMS: [ ]Unable to self-report  [ ] CPOT [ ] PAINADs [ ] RDOS  Source if other than patient:  [ ]Family   [ ]Team     Pain: [X ]yes [ ]no  QOL impact - severe  Location -      "all over"              Aggravating factors -  Quality - unable to report  Radiation -  Timing-  Severity (0-10 scale): unable to report  Minimal acceptable level (0-10 scale):  unable to report    CPOT:    https://www.[facility].org/getattachment/[email or web address]/Critical-Care-Pain-Observation-Tool-(CPOT)    PAIN AD Score:   http://geriatrictoolkit.[facility]/cog/painad.pdf (press ctrl +  left click to view)    Dyspnea:                           [ ]Mild [ ]Moderate [ ]Severe      RDOS:  0 to 2  minimal or no respiratory distress   3  mild distress  4 to 6 moderate distress  >7 severe distress  https://homecareinformation.net/handouts/hen/Respiratory_Distress_Observation_Scale.pdf (Ctrl +  left click to view)     Anxiety:                             [ ]Mild [ ]Moderate [ ]Severe  Fatigue:                             [ ]Mild [ ]Moderate [X ]Severe  Nausea:                             [ ]Mild [ ]Moderate [ ]Severe  Loss of appetite:              [ ]Mild [ ]Moderate [X ]Severe  Constipation:                    [ ]Mild [ ]Moderate [X ]Severe    PCSSQ[Palliative Care Spiritual Screening Question]   Severity (0-10): 8  Score of 4 or > indicate consideration of Chaplaincy referral.  Chaplaincy Referral: [ X] yes [ ] refused [ ] following [ ] Deferred     Caregiver [place]? : [X ] yes [ ] no [ ] Deferred [ ] Declined             Social work referral [ ] Patient & Family Centered Care Referral [ ]     Anticipatory Grief present?:  [X ] yes [ ] no  [ ] Deferred                  Social work referral [ ] Chaplaincy Referral[ ]    Other Symptoms:  [ ]All other review of systems negative - pt  minimally verbal/unable to report     Palliative Performance Status Version 2:       30  %    http://npcrc.org/files/news/palliative_performance_scale_ppsv2.pdf    PHYSICAL EXAM:  Vital Signs Last 24 Hrs  T(C): 36.7 (20 Feb 2023 05:22), Max: 36.7 (20 Feb 2023 05:22)  T(F): 98 (20 Feb 2023 05:22), Max: 98 (20 Feb 2023 05:22)  HR: 118 (20 Feb 2023 05:22) (118 - 118)  BP: 129/77 (20 Feb 2023 05:22) (129/77 - 129/77)  BP(mean): --  RR: 18 (20 Feb 2023 05:22) (18 - 18)  SpO2: 98% (20 Feb 2023 05:22) (98% - 98%)    Parameters below as of 20 Feb 2023 05:22  Patient On (Oxygen Delivery Method): nasal cannula  O2 Flow (L/min): 3   I&O's Summary    19 Feb 2023 07:01  -  20 Feb 2023 07:00  --------------------------------------------------------  IN: 100 mL / OUT: 0 mL / NET: 100 mL    GENERAL: [ ]Cachexia    [ ]Alert  [ ]Oriented x   [ X]Lethargic  [ ]Unarousable  [ X]Verbal - minimally [ ]Non-Verbal  Behavioral:   [ ] Anxiety  [X ] Delirium [ ] Agitation [ ] Other  HEENT:  [ ]Normal   [ X]Dry mouth   [ ]ET Tube/Trach  [ X]Oral lesions  PULMONARY:   [X ]Clear [ ]Tachypnea  [ ]Audible excessive secretions   [ ]Rhonchi        [ ]Right [ ]Left [ ]Bilateral  [ ]Crackles        [ ]Right [ ]Left [ ]Bilateral  [ ]Wheezing     [ ]Right [ ]Left [ ]Bilateral  [ ]Diminished breath sounds [ ]right [ ]left [ ]bilateral  CARDIOVASCULAR:    [ ]Regular [ ]Irregular [ X]Tachy  [ ]Jaime [ ]Murmur [ ]Other  GASTROINTESTINAL:  [X ]Soft  [ ]Distended   [ ]+BS  [X ]Non tender [ ]Tender  [ ]Other [ ]PEG [ ]OGT/ NGT  Last BM: 2/8 documented?  GENITOURINARY:  [ ]Normal [ X] Incontinent   [ ]Oliguria/Anuria   [ ]Stephenson  MUSCULOSKELETAL:   [ ]Normal   [ X]Weakness  [X ]Bed/Wheelchair bound [ ]Edema  NEUROLOGIC:   [ ]No focal deficits  [ X]Cognitive impairment  [ ]Dysphagia [ ]Dysarthria [ ]Paresis [ ]Other   SKIN:  see nursing assessments for details  [ ]Normal  [ ]Rash  [ ]Other  [ ]Pressure ulcer(s)       Present on admission [ ]y [ ]n    CRITICAL CARE:  [ ] Shock Present  [ ]Septic [ ]Cardiogenic [ ]Neurologic [ ]Hypovolemic  [ ]  Vasopressors [ ]  Inotropes   [ ]Respiratory failure present [ ]Mechanical ventilation [ ]Non-invasive ventilatory support [ ]High flow    [ ]Acute  [ ]Chronic [ ]Hypoxic  [ ]Hypercarbic [ ]Other  [ ]Other organ failure     LABS: reviewed    RADIOLOGY & ADDITIONAL STUDIES:    PROCEDURE DATE:  02/17/2023      INTERPRETATION:  CLINICAL INFORMATION: Follow-up multiple ill-defined   lung opacities seen on recent CT chest, as well as progression of   vasculitis. History of ANCA vasculitis and large B-cell lymphoma.  COMPARISON: CT chest abdomen pelvis 1/18/2023.  IMPRESSION:  Multiple bilateral nodular and opacities many of which have increased in   size or are new since prior study on 1/18/2023. Findings are concerning   for worsening infection although superimposed progression of vasculitis   can have a similar appearance. Short interval follow-up CT scan in one   month is recommended. Mild interval improvement of the right lung base   patchy opacities as compared with January 18, 2023.  Nodular opacity in the right upper lobe along the suture line mildly   increased in size, an indeterminate finding. Correlation with the biopsy   pathology results is recommended.  Slight improvement of the pleural effusions bilaterally. Interlobular   septal thickening suggestive of mild pulmonary edema.    PROTEIN CALORIE MALNUTRITION PRESENT: [ ]mild [ ]moderate [ ]severe [ ]underweight [ ]morbid obesity  https://www.andeal.org/vault/2440/web/files/ONC/Table_Clinical%20Characteristics%20to%20Document%20Malnutrition-White%20JV%20et%20al%202012.pdf    Height (cm): 152.4 (01-19-23 @ 13:37), 147.3 (11-10-22 @ 02:06), 154.9 (10-04-22 @ 11:51)  Weight (kg): 52.2 (01-19-23 @ 13:37), 58.1 (11-10-22 @ 02:06), 62.6 (10-04-22 @ 12:30)  BMI (kg/m2): 22.5 (01-19-23 @ 13:37), 26.8 (11-10-22 @ 02:06), 26.1 (10-04-22 @ 12:30)    [X ]PPSV2 < or = to 30% [ ]significant weight loss  [ X]poor nutritional intake  [ ]anasarca[ ]Artificial Nutrition      Other REFERRALS:  [ ]Hospice  [ ]Child Life  [X ]Social Work  [ ]Case management [ ]Holistic Therapy     Goals of Care Document: MARY Arias (02-01-23 @ 15:27)  Goals of Care Conversation:   Participants:  · Participants  Patient; Family  · Child(shavon)  the son Martin    Advance Directives:  · Does patient have Advance Directive  No  · Do you want to complete the HCP and name a [place] Care Agent  Yes  · Name of person who assisted  the son and the daughter  · Caregiver:  yes  · Name  Levon Townsend  · Phone Number  [phone]    Conversation Discussion:  · Conversation Details  The son brought in pt's living will, which states no ventilator, no CPR, no feeding tube if irreversible disease.  Advance directives discussed at bedside with pt and the son Levon. Introduced the philosophy of palliative care: quality vs. quantity of life. Goals of care discussed. All questions answered.   Pt and the son wishes requests DNR/DNI. No feeding tube.   Okay to continue with abx, IVF and chemotherapy if appropriate.   MOIST form signed.  Advance care planning time: approximately 32 mins spent discussing goals of care, prognosis, diagnosis and treatment plan, and filling out the MOLST form.   d/w NP.    Personal Advance Directives Treatment Guidelines:   Treatment Guidelines:  · Decision Maker  Patient  · Treatment Guidelines  DNR Order    Location of Discussion:   Time Spent on Advance Care Planning:  Attending or DAVID Only.     I personally spent 32 minutes on advance care planning services with the patient. This time is separate and distinct from any other care management services provided on this date.    Location of Discussion:  · Location of discussion  Face to face      Electronic Signatures:  [place])  (Signed 01-Feb-2023 15:28)  	Authored: Goals of Care Conversation, Personal Advance Directives Treatment Guidelines, Location of Discussion      Last Updated: 01-Feb-2023 15:28 by [place] [facility] ()

## 2023-02-20 NOTE — PROGRESS NOTE ADULT - ASSESSMENT
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 She presented here from [facility] for elevated WBC and low hemoglobin, severe weakness. Pt states for the past 2-3 days has been having increased weakness and lethargy, decreased appetite. +sputum productive cough. + cui, no sob, no difficulty breathing. No abdominal pain, nausea, vomiting, no bloody stools. Has endorsed multiple episodes of loose stools x weeks, currently being treated for c.diff with oral vancomycin. Nephrology consulted for Hyponatremia.       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

## 2023-02-20 NOTE — CONSULT NOTE ADULT - CONSULT REQUESTED DATE/TIME
02-Feb-2023 20:33
16-Feb-2023 12:45
20-Jan-2023 11:34
20-Jan-2023 16:34
21-Jan-2023 10:25
22-Jan-2023 18:11
06-Feb-2023 16:13
01-Feb-2023 21:00
20-Feb-2023 11:18

## 2023-02-20 NOTE — PROGRESS NOTE ADULT - ASSESSMENT
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 She presented here from [facility] for elevated WBC and low hemoglobin, severe weakness. Pt states for the past 2-3 days has been having increased weakness and lethargy, decreased appetite. +sputum productive cough. + cui, no sob, no difficulty breathing. No abdominal pain, nausea, vomiting, no bloody stools. Has endorsed multiple episodes of loose stools x weeks, currently being treated for c.diff with oral vancomycin.     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     Fatigue/dyspnea: due to severe anemia  - 2' MDS  - transfuse to keep Hgb above 7.0  - no melena, no hematochezia. no BRBPR. occult stool neg.   - she tends to require IV Lasix intermittently post transfusion.  - doubt GI bleed   - Physical therapy. Out of bed to chair with assistance.     Failure to thrive  - appears weak, fatigue  requiring numerous plts and prbc transfusions  CT chest worsening  ID / pulm/ rheum following  Lasix IV ordered since she received numerous transfusions. This does help with her nasal canula O2 requirement.   acyclovir started for HSV1 lips  (family requesting to continue Acyclovir for comfort)    Diarrhea, multifactorial  - elevated WBC  - no documented c.diff PCR, re-ordered.  - s/p Vanco PO a few days (started in rehab)  - diarrhea completely resolved.   - monitor off PO vanco per ID  - now constipated. PRN bowel regimen started. +BM    Fever, resolved.   - RVP 1/30/23 (-)  - monitor blood cxs 1/30/23  - started empirically on cefepime 1/30/23, switched to merrem 1/31/23 --> 2/3/23  - enterrocus bacteremia, abx per ID. on IV Vanco, last day was 2/10/23  - Macrobid x 5 days course added for VRE in urine, last day was 2/8/23  - blood cultures now negative   - completed abx    AMS  - unclear etiology, likely metabolic encephalopathy from ?Cefepime? received 3 doses, last dose 1/31/23  - CT head neg. sugars stable  - close monitoring   - monitor glucose (glucose 65 on BMP, but 95 on fingersticks)  - encourage PO intake   - mental status improved and back to baseline    Pulmonary vasculitis   - Recent TTE on 11/3/22 reviewed: normal LV. outpt card Dr. Ady Cooper  - outpt pulm Mack Tucker   - s/p thoracoscopic biopsy of mediastinal lymph node and Lung wedge resection 11/10/22  - recent pleural effusion s/p 650 cc U/S-guided rt thoracentesis 11/17/22  - exudative but no neutrophilic predominance and initial Gram stain w/o organisms  - cultures neg.   - path results favoring vasculitis: no evidence for lymphoma. Cytology also came back negative.   - comfortable on nasal canula, better post diuretic last admission.   - rheum and heme-onc following previously, reconsulted  - last admission, she was not started on immunosuppressants such as cellcept given recent treatment for COVID19 at that time  - c/w prednisone 20 mg qdaily.   - RTX under consideration after administration of dacogen --> acute hepatitis panel (-) and quantiferon indeterminate  - ID started PCP ppx with Bactrim.     hx of Tachycardia    - cont low-dose metoprolol, 50 mg to 25 mg dose reduced in rehab.   - card Dr. Hooker    Anxiety and depression  - stable, continue citalopram and Remeron.  - Pt denied SI/HI ideations, denied visual and auditory hallucinations.     GERD (gastroesophageal reflux disease)  - continue PPI    Hyperlipidemia.   - continue home ezetimibe. okay to hold if nonformulary     Hyponatremia 127 (hypovolemic?)  - renal on the case, resolved.   - s/p 3 days of IV Lasix, now completed. electrolytes supplemented.  - O2 weaned off from ventimask to nascal canula to room air.     DVT ppx   - holding AC in the setting of anemia, pancytopenia.   - venodyne boots LEs

## 2023-02-20 NOTE — PROGRESS NOTE ADULT - ASSESSMENT
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 She presented here from [facility] for elevated WBC and low hemoglobin, severe weakness. Pt states for the past 2-3 days has been having increased weakness and lethargy, decreased appetite. +sputum productive cough. + cui, no sob, no difficulty breathing. No abdominal pain, nausea, vomiting, no bloody stools. Has endorsed multiple episodes of loose stools x weeks, currently being treated for c.diff with oral vancomycin.       Fatigue/dyspnea: likely due to severe anemia :  pt s/p 1 unit PRB  Diarrhea  Pulmonary vasculitis  :  hx of Tachycardia :  Recent TTE on 11/3/22 reviewed: normal LV. outpt card Dr. Ady Cooper  Anxiety and depression  GERD (gastroesophageal reflux disease)  Hyperlipidemia.   DVT ppx     1/20:  Fatigue/dyspnea: likely due to severe anemia :  pt s/p 1 unit PRBC: hb now  > 10  : she is on 2 L of oxygen : no wheezing: no overt signs of bleeding : ct chest is abnormal report noted:  has jean-claude ill defined opacities of unclear etiology  : venous ph is mild acidotic:  no need for Bipap at this time : monitor and trend hb  Diarrhea : symptomatic rx:  workup per primary team  Pulmonary vasculitis  : per rheumatology  : had recent vats surgery : LN biopsy noted:   hx of Tachycardia :  Recent TTE on 11/3/22 reviewed: normal LV. outpt card Dr. Ady Cooper  Anxiety and depression  GERD (gastroesophageal reflux disease) : ppi   Hyperlipidemia.   recent covid  : o n last admission she was treated for covid infection:   DVT ppx   d wteam    1/22:    Fatigue/dyspnea: likely due to severe anemia :  pt s/p 1 unit PRBC: hb now  > 10  : she is on 2 L of oxygen : no wheezing: no overt signs of bleeding : ct chest is abnormal report noted:  has jean-claude ill defined opacities of unclear etiology  : venous ph is mild acidotic:  no need for Bipap at this time : monitor and trend hb: she remained stable o grcaie last 1 days:  she is not on any antibtiocs at this time: RVP  and blood cultures are negative: she had ebus biopsy also before: reviewed: ID following  Diarrhea : symptomatic rx:  workup per primary team : seems to have resolved  Pulmonary vasculitis  : per rheumatology  : had recent vats surgery : LN biopsy noted:   Bicytopneia and leucocytosis: ? etiology  : for possible bone marrow biopsy on Monday    hx of Tachycardia :  Recent TTE on 11/3/22 reviewed: normal LV. outpt card Dr. Ady Cooper  Anxiety and depression  GERD (gastroesophageal reflux disease) : ppi   Hyperlipidemia.   recent covid  : on last admission she was treated for covid infection:   DVT ppx   dw team    1/23:    Fatigue/dyspnea: likely due to severe anemia : feels weak  but no bleeding noted:  on 2 L of oxygen : she needs PT OT   Diarrhea :resolved:   Pulmonary vasculitis  : per rheumatology  : had recent vats surgery : LN biopsy noted: : she never received teat ment for vasculitis except low dose steroids:  she is awaiting bone marrow biopsy prior to induction therapy with rituximab: The ct chest done on this admission does not show pneumothorax and has jean-claude effusions:  There are some new opacites in rigth lower lobe which were not therre:  clinically she does not seem to have pneumonia: ID following: could it be a prt of vasculitis  Bicytopneia and leucocytosis: ? etiology  : for possible bone marrow biopsy today  hx of Tachycardia :  Recent TTE on 11/3/22 reviewed: normal LV. outpt card Dr. Ady Cooper  Anxiety and depression  GERD (gastroesophageal reflux disease) : ppi   Hyperlipidemia.   recent covid  : on last admission she was treated for covid infection:   DVT ppx   dw team    1/24:    Fatigue/dyspnea: likely due to severe anemia : feels weak  but no bleeding noted:  on 2 L of oxygen : she needs PT OT   Diarrhea :resolved:   Pulmonary vasculitis  : per rheumatology  : had recent vats surgery : LN biopsy noted: : she never received teat ment for vasculitis except low dose steroids:  she is awaiting bone marrow biopsy prior to induction therapy with rituximab: The ct chest done on this admission does not show pneumothorax and has jean-claude effusions:  There are some new opacites in rigth lower lobe which were not therre:  clinically she does not seem to have pneumonia: ID following: could it be a prt of vasculitis  Bicytopneia and leucocytosis: BM biopsy done: await results for eventual immunosuppressives therapy:   hx of Tachycardia :  Recent TTE on 11/3/22 reviewed: normal LV. outpt card Dr. Ady Cooper  Anxiety and depression  GERD (gastroesophageal reflux disease) : ppi   Hyperlipidemia.   recent covid  : on last admission she was treated for covid infection:   DVT ppx   dw team    1/25:    Fatigue/dyspnea: likely due to severe anemia : feels weak  but no bleeding noted:  on 2 L of oxygen : she needs PT OT   Diarrhea :resolved:   Pulmonary vasculitis  : per rheumatology  : had recent vats surgery : LN biopsy noted: : she never received teat ment for vasculitis except low dose steroids:  she is awaiting bone marrow biopsy prior to induction therapy with rituximab: The ct chest done on this admission does not show pneumothorax and has jean-claude effusions:  There are some new opacites in rigth lower lobe which were not there:  clinically she does not seem to have pneumonia: ID following: could it be a part of vasculitis : her resp status has not changed   Bicytopneia and leucocytosis: BM biopsy done: await results for still pending   hx of Tachycardia :  Recent TTE on 11/3/22 reviewed: normal LV. outpt card Dr. Ady Cooper  Anxiety and depression  GERD (gastroesophageal reflux disease) : ppi   Hyperlipidemia.   recent covid  : on last admission she was treated for covid infection:   DVT ppx   dw team: awaiting decision on immunosuppression     1/26;      Fatigue/dyspnea: likely due to severe anemia : feels weak  but no bleeding noted:  on 2 L of oxygen : she needs PT OT : got it yesterday    Diarrhea :resolved:   Pulmonary vasculitis  : per rheumatology  : had recent vats surgery : LN biopsy noted: : she never received teat ment for vasculitis except low dose steroids:  she is awaiting bone marrow biopsy prior to induction therapy with rituximab: The ct chest done on this admission does not show pneumothorax and has jean-claude effusions:  There are some new opacities in rigth lower lobe which were not there:  clinically she does not seem to have pneumonia: ID following: could it be a part of vasculitis : her resp status has not changed  : being observed off antibiotics   Bicytopneia and leucocytosis: BM biopsy done: await results for still pending   hx of Tachycardia :  Recent TTE on 11/3/22 reviewed: normal LV. outpt card Dr. Ady Cooper  Anxiety and depression  GERD (gastroesophageal reflux disease) : ppi   Hyperlipidemia.   recent covid  : on last admission she was treated for covid infection:   DVT ppx   dw team: awaiting decision on immunosuppression     1/27:    Fatigue/dyspnea: likely due to severe anemia : feels weak  but no bleeding noted:  on 2 L of oxygen : cont  PT OT :   Diarrhea :resolved:   Pulmonary vasculitis  : per rheumatology  : had recent vats surgery : LN biopsy noted: : she never received teat ment for vasculitis except low dose steroids:  she is awaiting bone marrow biopsy prior to induction therapy with rituximab: The ct chest done on this admission does not show pneumothorax and has jean-claude effusions:  There are some new opacities in rigth lower lobe which were not there:  clinically she does not seem to have pneumonia: ID following: could it be a part of vasculitis : her resp status has not changed  : being observed off antibiotics :  on bactrim prophylaxis as she is on chr steroids   Bicytopneia and leucocytosis: BM biopsy done: await results for still pending   hx of Tachycardia :  Recent TTE on 11/3/22 reviewed: normal LV. outpt card Dr. Ady Cooper  Anxiety and depression  GERD (gastroesophageal reflux disease) : ppi   Hyperlipidemia.   recent covid  : on last admission she was treated for covid infection:   DVT ppx   dw team: awaiting decision on immunosuppression     1/29:    Fatigue/dyspnea: likely due to severe anemia : feels weak  but no bleeding noted:  on 2 L of oxygen : cont  PT OT :   Diarrhea :resolved:   Pulmonary vasculitis  : per rheumatology  : had recent vats surgery : LN biopsy noted: : she never received treat ment for vasculitis except low dose steroids:  she is awaiting bone marrow biopsy prior to induction therapy with rituximab: The ct chest done on this admission does not show pneumothorax and has jean-claude effusions:  There are some new opacities in rigth lower lobe which were not there:  clinically she does not seem to have pneumonia: ID following: could it be a part of vasculitis : her resp status has not changed  : being observed off antibiotics :  on bactrim prophylaxis as she is on chr steroids   Bicytopneia and leucocytosis: BM biopsy: results reviewed defer to [facility]  hx of Tachycardia :  Recent TTE on 11/3/22 reviewed: normal LV.   Anxiety and depression  GERD (gastroesophageal reflux disease) : ppi   Hyperlipidemia.   recent covid  : on last admission she was treated for covid infection:   DVT ppx   dw team: awaiting decision on immunosuppression     1/30:    Fatigue/dyspnea: likely due to severe anemia : feels weak  but no bleeding noted:  on 2 L of oxygen : cont  PT OT : sitting in chair today   Diarrhea :resolved:   Pulmonary vasculitis  : per rheumatology  : had recent vats surgery : LN biopsy noted: : she never received treat ment for vasculitis except low dose steroids:  she is awaiting bone marrow biopsy prior to induction therapy with rituximab: The ct chest done on this admission does not show pneumothorax and has jean-claude effusions:  There are some new opacities in rigth lower lobe which were not there:  clinically she does not seem to have pneumonia: ID following: could it be a part of vasculitis : her resp status has not changed  : being observed off antibiotics :  on bactrim prophylaxis as she is on chr steroids  /l however she had low grade tempt today : rvp is negative : cultures sent: ID follow up   Bicytopneia and leucocytosis: BM biopsy: results reviewed defer to [facility]  hx of Tachycardia :  Recent TTE on 11/3/22 reviewed: normal LV.   Anxiety and depression  GERD (gastroesophageal reflux disease) : ppi   Hyperlipidemia.   recent covid  : on last admission she was treated for covid infection:   DVT ppx   dw team: awaiting decision on immunosuppression     1/31:    Fatigue/dyspnea: likely due to severe anemia : feels weak  but no bleeding noted:  on 2 L of oxygen : cont  PT OT : lying in bed:   Diarrhea :resolved:   Pulmonary vasculitis  : per rheumatology  : had recent vats surgery : LN biopsy noted: : she never received treat ment for vasculitis except low dose steroids:  she is awaiting bone marrow biopsy prior to induction therapy with rituximab: The ct chest done on this admission does not show pneumothorax and has jean-claude effusions:  There are some new opacities in rigth lower lobe which were not there:  clinically she does not seem to have pneumonia: ID following: could it be a part of vasculitis : her resp status has not changed  : being observed off antibiotics :  on bactrim prophylaxis as she is on chr steroids : she seems OK:  no sob:     Bicytopneia and leucocytosis: BM biopsy: results reviewed defer to [facility] ; for eventual chemo   hx of Tachycardia :  Recent TTE on 11/3/22 reviewed: normal LV.   Anxiety and depression  GERD (gastroesophageal reflux disease) : ppi   Hyperlipidemia.   recent covid  : on last admission she was treated for covid infection:   DVT ppx   dw team: awaiting decision on immunosuppression     2/1:    Fatigue/dyspnea: likely due to severe anemia : feels weak  but no bleeding noted:  on 2 L of oxygen : cont  PT OT : lying in bed:  her blood cultures are contaminant:   Diarrhea :resolved:   Pulmonary vasculitis  : per rheumatology  : had recent vats surgery : LN biopsy noted: : she never received treat ment for vasculitis except low dose steroids:  she is awaiting bone marrow biopsy prior to induction therapy with rituximab: The ct chest done on this admission does not show pneumothorax and has jean-claude effusions:  There are some new opacities in rigth lower lobe which were not there:  clinically she does not seem to have pneumonia: ID following: could it be a part of vasculitis : her resp status has not changed  : being observed off antibiotics :  on bactrim prophylaxis as she is on chr steroids : she seems OK:  no sob:     Bicytopneia and leucocytosis: BM biopsy: results reviewed defer to [facility] ; for eventual chemo   hx of Tachycardia :  Recent TTE on 11/3/22 reviewed: normal LV.   Anxiety and depression  GERD (gastroesophageal reflux disease) : ppi   Hyperlipidemia.   recent covid  : on last admission she was treated for covid infection:   DVT ppx   dw team: awaiting decision on immunosuppression: overall her general condition seems very poor and very weak:     2/2:    Fatigue/dyspnea: likely due to severe anemia : feels weak  but no bleeding noted:  on 2 L of oxygen : cont  PT OT : lying in bed:  her blood cultures are positive E FAECALIS :  ON MEROPENEM  Diarrhea :resolved:   Pulmonary vasculitis  : per rheumatology  : had recent vats surgery : LN biopsy noted: : she never received treat ment for vasculitis except low dose steroids:  she is awaiting bone marrow biopsy prior to induction therapy with rituximab: The ct chest done on this admission does not show pneumothorax and has jean-claude effusions:  There are some new opacities in rigth lower lobe which were not there:  clinically she does not seem to have pneumonia: ID following: could it be a part of vasculitis : her resp status has not changed  : being observed off antibiotics :  on bactrim prophylaxis as she is on chr steroids : she seems OK:  no sob:     Bicytopneia and leucocytosis: BM biopsy: results reviewed defer to [facility] ; for eventual chemo   hx of Tachycardia :  Recent TTE on 11/3/22 reviewed: normal LV.   Anxiety and depression  GERD (gastroesophageal reflux disease) : ppi   Hyperlipidemia.   recent covid  : on last admission she was treated for covid infection:   DVT ppx   dw team: awaiting decision on immunosuppression: overall her general condition seems very poor and very weak: ON ANTIBIOTICS     2/3   Pulmonary Vasculitis  -Steroids per rheum  -Plan for eventual Rituxan  -Bactrim for PCP ppx  MDS  -Per heme/onc  Bacteremia  -E. Faecalis bacteremia  -ABX per ID  Epistaxis  -Per ENT  -Breathing comfortably on 40% humidified face tent, keep sats >90%    2/6:    2/6  Pulmonary Vasculitis  -Steroids per rheum  -Plan for eventual Rituxan /l she is on room air at this time  -Bactrim for PCP ppx  MDS  -Per heme/onc  Bacteremia  -E. Faecalis bacteremia  -ABX per ID  Epistaxis  -Per ENT  - she is on room air today  : cont to mantain o2 sao2 above 90% all the ti me:     acp      2/7:  Pulmonary Vasculitis  -Steroids per rheum  -Plan for eventual Rituxan /l she is on room air at this time  -Bactrim for PCP ppx  MDS  -Per heme/onc  Bacteremia  -E. Faecalis bacteremia  -ABX per ID : awaiting clearance from id for chemo : last blood cultures have been negative  Epistaxis  -Per ENT  - she is on room air today  : cont to mantain o2 sao2 above 90% all the ti me:     acp    2/8:  Pulmonary Vasculitis  -Steroids per rheum  -Plan for eventual Rituxan /l she is on room air at this time  -Bactrim for PCP ppx  MDS  -Per heme/onc  Bacteremia  -E. Faecalis bacteremia  -ABX per ID : awaiting clearance from id for chemo : last blood cultures have been negative : finishing antbiotics today  :  Epistaxis  -Per ENT  - she is on room air today  : cont to mantain o2 sao2 above 90% all the ti me:  Thrombocytopenia:  sign today  : 14k: no obvious bleeding: cont to monitorial : transfuse per [facility]mo[facility]     acp      2/9:    Pulmonary Vasculitis : Steroids per rheum : Plan for eventual Rituxan /l she is on room air at this time : Bactrim for PCP ppx : on room air: with no change in her resp status  MDS : s'p BM biopsy : has MDS : defer to hemonc  Bacteremia E. Faecalis bacteremia  -ABX per ID : awaiting clearance from id for chemo : last blood cultures have been negative : finished antibiotics   Epistaxis Per ENT : she is on room air today  : cont to mantain o2 sao2 above 90% all the ti me:  Thrombocytopenia:  sign today  : 10k: no obvious bleeding: cont to monitorial : transfuse per [facility] :? for plt transfusion  today      dw acp    2/10:  Pulmonary Vasculitis : Steroids per rheum : Plan for eventual Rituxan /l she is on room air at this time : Bactrim for PCP ppx : on room air: with no change in her resp status : now plan for rituximab aftger 5 days of dacogen   MDS : s'p BM biopsy : has MDS : defer to hemonc  Bacteremia E. Faecalis bacteremia  -ABX per ID : awaiting clearance from id for chemo : last blood cultures have been negative : finished antibiotics   Epistaxis Per ENT : she is on room air today  : cont to mantain o2 sao2 above 90% all the ti me:  Thrombocytopenia:  still low, but much better,  no obvious bleeding: cont to monitorial :  dw acp    2/12:  Pulmonary Vasculitis : Steroids per rheum : Plan for eventual Rituxan /l she is on room air at this time : Bactrim for PCP ppx : on room air: with no change in her resp status : now plan for rituximab after 5 days of dacogen : today is day 3 : doing  ok : no resp idstress  MDS : s'p BM biopsy : has MDS : defer to hemonc  Bacteremia E. Faecalis bacteremia  -ABX per ID : awaiting clearance from id for chemo : last blood cultures have been negative : finished antibiotics   Epistaxis Per ENT : she is on room air today  : cont to mantain o2 sao2 above 90% all the ti me:  Thrombocytopenia:  still low, but much better,  no obvious bleeding: cont to monitor :  dw acp      2/13:  Low grade temperature : pancultured:  chest xray with improvement in infiltrates : id following : no antibiotics yet:   Pulmonary Vasculitis : Steroids per rheum : Plan for eventual Rituxan.  she is on room air at this time : Bactrim for PCP ppx : on 2 L of oxygen today : rpt chest xray noted:   MDS : s'p BM biopsy : has MDS : defer to hemonc  Bacteremia E. Faecalis bacteremia  -ABX per ID : awaiting clearance from id for chemo : last blood cultures have been negative : finished antibiotics   Epistaxis Per ENT : resolved  Thrombocytopenia:  for transfusion today    dw acp : pt looks pretty weak and cachectic:      2/14;  Low grade temperature :resolved  pancultured:  chest xray with improvement in infiltrates : id following : no antibiotics yet:   Pulmonary Vasculitis : Steroids per rheum : Plan for eventual Rituxan.  she is on room air at this time : Bactrim for PCP ppx : on 2 L of oxygen today : rpt chest xray noted:   MDS : s'p BM biopsy : has MDS : defer to [facility]  Bacteremia E. Faecalis bacteremia: resolved:   Epistaxis Per ENT : resolved  Thrombocytopenia:  for transfusion today    dw acp : pt looks pretty weak and cachectic:        2/15:    Low grade temperature :resolved  pancultured:  chest xray with improvement in infiltrates : id following : no antibiotics yet: still   Pulmonary Vasculitis : Steroids per rheum : Plan for eventual Rituxan.  she is on room air at this time : Bactrim for PCP ppx : on 2 L of oxygen today : rpt chest xray noted: for rituximab per rheum:   MDS :with increased blasts:  s'p BM biopsy : has MDS : defer to [facility]monc : gettng chemo   Bacteremia E. Faecalis bacteremia: resolved:   Epistaxis Per ENT : resolved  Thrombocytopenia:  for transfusion today    dw acp : pt looks pretty weak and cachectic:  :  cont current suppotive care: p lats transfusion    2/16:    Hypoxic resp failure:  her o2 requirement has increased today  : chest xray ordered: and ABG orderd:  she also seems confused today :   Low grade temperature :resolved  pancultured:  chest xray with improvement in infiltrates : id following : no antibiotics yet: still   Pulmonary Vasculitis : Steroids per rheum : Plan for eventual Rituxan.  she is on room air at this time : Bactrim for PCP ppx : on 4 L of oxygen today : rpt chest xray orderd: : for rituximab per rheum:   MDS :with increased blasts:  s'p BM biopsy : has MDS : defer to [facility]monc : gettng chemo   Bacteremia E. Faecalis bacteremia: resolved:   Epistaxis Per ENT : resolved  Thrombocytopenia:  for transfusion today     pt looks pretty weak and cachectic:  :  cont current supportive care: p lats transfusion:  today she isnot looking good: more hypoxic: await abg"  dw acp    2/17:    Hypoxic resp failure:  her o2 requirement has increased today  : chest xray ordered: and ABG orderd:  she also seems confused today : CT SCAN CHEST REVIEWED:  SHOWED; Multiple bilateral nodular and opacities many of which have increased in  size or are new since prior study on 1/18/2023. Findings are concerning  for worsening infection although superimposed progression of vasculitis  can have a similar appearance. Short interval follow-up CT scan in one  month is recommended. Mild interval improvement of the right lung base  patchy opacities as compared with January 18, 2023. Nodular opacity in the right upper lobe along the suture line mildly  increased in size, an indeterminate finding. Correlation with the biopsy  pathology results is recommended. Slight improvement of the pleural effusions bilaterally. Interlobular  septal thickening suggestive of mild pulmonary edema. SHE HAS WORSENING CT SCAN CHEST AND SHEIS REQUIRING TRANSFUSIONS ALMOST DAILY : WORSENING OF VASCULITIS ? FOR RITUXIMAB : ID FOLLOW UP NEEDS TO BE DONE:  OVERALL SHEIS DOING PRETTY POORLY:   Low grade temperature :resolved  pancultured:  chest xray with improvement in infiltrates : id following : no antibiotics yet: still   Pulmonary Vasculitis : Steroids per rheum : Plan for eventual Rituxan.  she is on room air at this time : Bactrim for PCP ppx : on 4 L of oxygen today :  for rituximab per rheum:   MDS :with increased blasts:  s'p BM biopsy : has MDS : defer to [facility]monc : gettng chemo   Bacteremia E. Faecalis bacteremia: resolved:   Epistaxis Per ENT : resolved  Thrombocytopenia:  for transfusion today     pt looks pretty weak and cachectic:  :  cont current supportive care: p lats transfusion:  today she is not looking good: TODAY TOO  more hypoxic: abg YETERAY WAS PRETTY DECENT; WITH MILD RESP ALKALOSIS AND ME ACIDOSIS:   [facility]    2/18 for Mehrishi: comfortable on 2L at present, CT chest reviewed. Cont diuresis. Steroids as per rheum. Decision pending on rituximab.     2/20:    Hypoxic resp failure:  her o2 requirement has increased today  : chest xray ordered: and ABG orderd:  she also seems confused today : CT SCAN CHEST REVIEWED:  SHOWED; Multiple bilateral nodular and opacities many of which have increased in  size or are new since prior study on 1/18/2023. Findings are concerning  for worsening infection although superimposed progression of vasculitis  can have a similar appearance. Short interval follow-up CT scan in one  month is recommended. Mild interval improvement of the right lung base  patchy opacities as compared with January 18, 2023. Nodular opacity in the right upper lobe along the suture line mildly  increased in size, doing very poorly:  now the GOC has changed:  comfort care: on Dilaudid   Low grade temperature :resolved  : comfor care: no blood draws:   Pulmonary Vasculitis :comfort care now:   MDS :supportive rx:  comfort carre  Bacteremia E. Faecalis bacteremia: resolved:   Epistaxis Per ENT : resolved  Thrombocytopenia:  no blood dreaws   pt looks pretty weak and cachectic:  :  now comfort care:     will sign off:     dw team and family

## 2023-02-20 NOTE — CONSULT NOTE ADULT - ASSESSMENT
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 She presented here from [facility] for elevated WBC and low hemoglobin, severe weakness. Pt no longer to receive treatment with rheumatology for her vasculitis and family requested palliative care consult.

## 2023-02-21 NOTE — PROVIDER CONTACT NOTE (OTHER) - ACTION/TREATMENT ORDERED:
Aline Haley aware. As per provider metoprolol to be given. Continue to monitor.
See patient expiration note
ACP made aware. RN awaiting orders from ACP.
MARGRET Parisi aware, no further interventions at this time, pt safety maintained
Provider made aware.
ACP made aware. Continue to monitor BMs, offer at later medication pass.
ACP Sahara chau.
Aline Haley aware, PA  to come up and assess situation.
MARGRET Wei made aware. Put in provider to RN that Okay not to give oral medications
MARGRET Santa made aware; Will continue to monitor pt.

## 2023-02-21 NOTE — PROGRESS NOTE ADULT - NUTRITIONAL ASSESSMENT
This patient has been assessed with a concern for Malnutrition and has been determined to have a diagnosis/diagnoses of Severe protein-calorie malnutrition and Underweight (BMI < 19).    This patient is being managed with:   Diet Regular-  1200mL Fluid Restriction ([id number])  Supplement Feeding Modality:  Oral  Ensure Clear Cans or Servings Per Day:  2       Frequency:  Daily  Entered: Feb 2 2023  3:27PM    
This patient has been assessed with a concern for Malnutrition and has been determined to have a diagnosis/diagnoses of Severe protein-calorie malnutrition and Underweight (BMI < 19).    This patient is being managed with:   Diet Regular-  1200mL Fluid Restriction ([id number])  Supplement Feeding Modality:  Oral  Ensure Clear Cans or Servings Per Day:  2       Frequency:  Daily  Entered: Feb 2 2023  3:27PM    
This patient has been assessed with a concern for Malnutrition and has been determined to have a diagnosis/diagnoses of Severe protein-calorie malnutrition and Underweight (BMI < 19).    This patient is being managed with:   Diet Regular-  1200mL Fluid Restriction ([id number])  Supplement Feeding Modality:  Oral  Ensure Enlive Cans or Servings Per Day:  3       Frequency:  Daily  Entered: Jan 26 2023  4:42PM    
This patient has been assessed with a concern for Malnutrition and has been determined to have a diagnosis/diagnoses of Severe protein-calorie malnutrition and Underweight (BMI < 19).    This patient is being managed with:   Diet Regular-  1200mL Fluid Restriction ([id number])  Supplement Feeding Modality:  Oral  Ensure Clear Cans or Servings Per Day:  2       Frequency:  Daily  Entered: Feb 2 2023  3:27PM    
This patient has been assessed with a concern for Malnutrition and has been determined to have a diagnosis/diagnoses of Severe protein-calorie malnutrition and Underweight (BMI < 19).    This patient is being managed with:   Diet Regular-  1200mL Fluid Restriction ([id number])  Supplement Feeding Modality:  Oral  Ensure Clear Cans or Servings Per Day:  2       Frequency:  Daily  Entered: Feb 2 2023  3:27PM    
This patient has been assessed with a concern for Malnutrition and has been determined to have a diagnosis/diagnoses of Severe protein-calorie malnutrition and Underweight (BMI < 19).    This patient is being managed with:   Diet Regular-  1200mL Fluid Restriction ([id number])  Supplement Feeding Modality:  Oral  Ensure Clear Cans or Servings Per Day:  2       Frequency:  Daily  Entered: Feb 2 2023  3:27PM    
This patient has been assessed with a concern for Malnutrition and has been determined to have a diagnosis/diagnoses of Severe protein-calorie malnutrition and Underweight (BMI < 19).    This patient is being managed with:   Diet Regular-  1200mL Fluid Restriction ([id number])  Supplement Feeding Modality:  Oral  Ensure Enlive Cans or Servings Per Day:  3       Frequency:  Daily  Entered: Jan 26 2023  4:42PM    
This patient has been assessed with a concern for Malnutrition and has been determined to have a diagnosis/diagnoses of Severe protein-calorie malnutrition and Underweight (BMI < 19).    This patient is being managed with:   Diet Regular-  1200mL Fluid Restriction ([id number])  Supplement Feeding Modality:  Oral  Ensure Clear Cans or Servings Per Day:  2       Frequency:  Daily  Entered: Feb 2 2023  3:27PM    
This patient has been assessed with a concern for Malnutrition and has been determined to have a diagnosis/diagnoses of Severe protein-calorie malnutrition and Underweight (BMI < 19).    This patient is being managed with:   Diet Regular-  1200mL Fluid Restriction ([id number])  Supplement Feeding Modality:  Oral  Ensure Clear Cans or Servings Per Day:  2       Frequency:  Daily  Entered: Feb 2 2023  3:27PM    
This patient has been assessed with a concern for Malnutrition and has been determined to have a diagnosis/diagnoses of Severe protein-calorie malnutrition and Underweight (BMI < 19).    This patient is being managed with:   Diet Regular-  1200mL Fluid Restriction ([id number])  Supplement Feeding Modality:  Oral  Ensure Clear Cans or Servings Per Day:  2       Frequency:  Daily  Entered: Feb 2 2023  3:27PM    
This patient has been assessed with a concern for Malnutrition and has been determined to have a diagnosis/diagnoses of Severe protein-calorie malnutrition and Underweight (BMI < 19).    This patient is being managed with:   Diet Regular-  1200mL Fluid Restriction ([id number])  Supplement Feeding Modality:  Oral  Ensure Clear Cans or Servings Per Day:  2       Frequency:  Daily  Entered: Feb 2 2023  3:27PM    
This patient has been assessed with a concern for Malnutrition and has been determined to have a diagnosis/diagnoses of Severe protein-calorie malnutrition and Underweight (BMI < 19).    This patient is being managed with:   Diet Regular-  1200mL Fluid Restriction ([id number])  Supplement Feeding Modality:  Oral  Ensure Clear Cans or Servings Per Day:  2       Frequency:  Daily  Entered: Feb 2 2023  3:27PM    
This patient has been assessed with a concern for Malnutrition and has been determined to have a diagnosis/diagnoses of Severe protein-calorie malnutrition and Underweight (BMI < 19).    This patient is being managed with:   Diet Regular-  1200mL Fluid Restriction ([id number])  Supplement Feeding Modality:  Oral  Ensure Enlive Cans or Servings Per Day:  3       Frequency:  Daily  Entered: Jan 26 2023  4:42PM    
This patient has been assessed with a concern for Malnutrition and has been determined to have a diagnosis/diagnoses of Severe protein-calorie malnutrition and Underweight (BMI < 19).    This patient is being managed with:   Diet Regular-  1200mL Fluid Restriction ([id number])  Supplement Feeding Modality:  Oral  Ensure Enlive Cans or Servings Per Day:  3       Frequency:  Daily  Entered: Jan 26 2023  4:42PM    
This patient has been assessed with a concern for Malnutrition and has been determined to have a diagnosis/diagnoses of Severe protein-calorie malnutrition and Underweight (BMI < 19).    This patient is being managed with:   Diet NPO-  Entered: Feb 21 2023 10:02AM    
This patient has been assessed with a concern for Malnutrition and has been determined to have a diagnosis/diagnoses of Severe protein-calorie malnutrition and Underweight (BMI < 19).    This patient is being managed with:   Diet Regular-  1200mL Fluid Restriction ([id number])  Supplement Feeding Modality:  Oral  Ensure Clear Cans or Servings Per Day:  2       Frequency:  Daily  Entered: Feb 2 2023  3:27PM    
This patient has been assessed with a concern for Malnutrition and has been determined to have a diagnosis/diagnoses of Severe protein-calorie malnutrition and Underweight (BMI < 19).    This patient is being managed with:   Diet Regular-  1200mL Fluid Restriction ([id number])  Supplement Feeding Modality:  Oral  Ensure Clear Cans or Servings Per Day:  2       Frequency:  Daily  Entered: Feb 2 2023  3:27PM    
This patient has been assessed with a concern for Malnutrition and has been determined to have a diagnosis/diagnoses of Severe protein-calorie malnutrition and Underweight (BMI < 19).    This patient is being managed with:   Diet Regular-  1200mL Fluid Restriction ([id number])  Supplement Feeding Modality:  Oral  Ensure Clear Cans or Servings Per Day:  2       Frequency:  Daily  Entered: Feb 2 2023  3:27PM    
This patient has been assessed with a concern for Malnutrition and has been determined to have a diagnosis/diagnoses of Severe protein-calorie malnutrition and Underweight (BMI < 19).    This patient is being managed with:   Diet Regular-  1200mL Fluid Restriction ([id number])  Supplement Feeding Modality:  Oral  Ensure Enlive Cans or Servings Per Day:  3       Frequency:  Daily  Entered: Jan 26 2023  4:42PM    
This patient has been assessed with a concern for Malnutrition and has been determined to have a diagnosis/diagnoses of Severe protein-calorie malnutrition and Underweight (BMI < 19).    This patient is being managed with:   Diet Regular-  1200mL Fluid Restriction ([id number])  Supplement Feeding Modality:  Oral  Ensure Clear Cans or Servings Per Day:  2       Frequency:  Daily  Entered: Feb 2 2023  3:27PM    
This patient has been assessed with a concern for Malnutrition and has been determined to have a diagnosis/diagnoses of Severe protein-calorie malnutrition and Underweight (BMI < 19).    This patient is being managed with:   Diet Regular-  1200mL Fluid Restriction ([id number])  Supplement Feeding Modality:  Oral  Ensure Clear Cans or Servings Per Day:  2       Frequency:  Daily  Entered: Feb 2 2023  3:27PM    
This patient has been assessed with a concern for Malnutrition and has been determined to have a diagnosis/diagnoses of Severe protein-calorie malnutrition and Underweight (BMI < 19).    This patient is being managed with:   Diet Regular-  1200mL Fluid Restriction ([id number])  Supplement Feeding Modality:  Oral  Ensure Clear Cans or Servings Per Day:  2       Frequency:  Daily  Entered: Feb 2 2023  3:27PM    
This patient has been assessed with a concern for Malnutrition and has been determined to have a diagnosis/diagnoses of Severe protein-calorie malnutrition and Underweight (BMI < 19).    This patient is being managed with:   Diet Regular-  1200mL Fluid Restriction ([id number])  Supplement Feeding Modality:  Oral  Ensure Enlive Cans or Servings Per Day:  3       Frequency:  Daily  Entered: Jan 26 2023  4:42PM    
This patient has been assessed with a concern for Malnutrition and has been determined to have a diagnosis/diagnoses of Severe protein-calorie malnutrition and Underweight (BMI < 19).    This patient is being managed with:   Diet Regular-  1200mL Fluid Restriction ([id number])  Supplement Feeding Modality:  Oral  Ensure Clear Cans or Servings Per Day:  2       Frequency:  Daily  Entered: Feb 2 2023  3:27PM    
This patient has been assessed with a concern for Malnutrition and has been determined to have a diagnosis/diagnoses of Severe protein-calorie malnutrition and Underweight (BMI < 19).    This patient is being managed with:   Diet Regular-  1200mL Fluid Restriction ([id number])  Supplement Feeding Modality:  Oral  Ensure Clear Cans or Servings Per Day:  2       Frequency:  Daily  Entered: Feb 2 2023  3:27PM    
This patient has been assessed with a concern for Malnutrition and has been determined to have a diagnosis/diagnoses of Severe protein-calorie malnutrition and Underweight (BMI < 19).    This patient is being managed with:   Diet Regular-  1200mL Fluid Restriction ([id number])  Supplement Feeding Modality:  Oral  Ensure Clear Cans or Servings Per Day:  2       Frequency:  Daily  Entered: Feb 2 2023  3:27PM    
This patient has been assessed with a concern for Malnutrition and has been determined to have a diagnosis/diagnoses of Severe protein-calorie malnutrition and Underweight (BMI < 19).    This patient is being managed with:   Diet Regular-  1200mL Fluid Restriction ([id number])  Supplement Feeding Modality:  Oral  Ensure Enlive Cans or Servings Per Day:  3       Frequency:  Daily  Entered: Jan 26 2023  4:42PM    
This patient has been assessed with a concern for Malnutrition and has been determined to have a diagnosis/diagnoses of Severe protein-calorie malnutrition and Underweight (BMI < 19).    This patient is being managed with:   Diet Regular-  1200mL Fluid Restriction ([id number])  Supplement Feeding Modality:  Oral  Ensure Clear Cans or Servings Per Day:  2       Frequency:  Daily  Entered: Feb 2 2023  3:27PM    
This patient has been assessed with a concern for Malnutrition and has been determined to have a diagnosis/diagnoses of Severe protein-calorie malnutrition and Underweight (BMI < 19).    This patient is being managed with:   Diet NPO-  Entered: Feb 21 2023 10:02AM

## 2023-02-21 NOTE — PROGRESS NOTE ADULT - PROVIDER SPECIALTY LIST ADULT
Infectious Disease
Internal Medicine
Internal Medicine
Nephrology
Neurology
Neurology
Cardiology
Heme/Onc
Infectious Disease
Internal Medicine
Nephrology
Nephrology
Pulmonology
Rheumatology
ENT
Heme/Onc
Infectious Disease
Internal Medicine
Nephrology
Neurology
Neurology
Pulmonology
Rheumatology
Cardiology
Cardiology
ENT
ENT
Heme/Onc
Infectious Disease
Internal Medicine
Nephrology
Pulmonology
Rheumatology
Internal Medicine
Palliative Care

## 2023-02-21 NOTE — PROGRESS NOTE ADULT - PROBLEM SELECTOR PLAN 1
- gram-positive abx coverage for duration of packing placement.   - Will remove packing on 2/4.   - Apply mittens to prevent packing removal.   - Nasal saline, 2 sprays to both nares 4 times a day.  - Strict blood pressure control.  - Avoid nasal trauma; no nose rubbing, blowing or manipulating nasal packing.   - Sneeze with mouth open and pinching nares.  - Avoid bending with head blow the waist.    - No heavy lifting.  - Transfusion support prn.   - ENT will follow.   - Call with  questions.
Packing Removed   Continue nasal saline   Reconsult ENT PRN
- gram-positive abx coverage for duration of packing placement  - plan for packing removal 2/4  - Strict blood pressure control.  - Nasal saline, 2 sprays to both nares 4 times a day  - Avoid nasal trauma; no nose rubbing, blowing or manipulating nasal packing.  Sneeze with mouth open and pinching nares.  - Avoid bending with head blow the waist.    -No heavy lifting  - ENT will continue to follow
PRNs used q24hr (8a-8a): 1x Dilaudid 0.5 mg for severe pain  -c/w Dilaudid 0.5 mg IV q6 ATC  -c/w Dilaudid 0.5 mg q1hr PRN for severe pain  -c/w Dilaudid 0.2 mg q1hr PRN for moderate pain  -Bowel regimen while on opiates

## 2023-02-21 NOTE — PROVIDER CONTACT NOTE (OTHER) - ASSESSMENT
Pt A&Ox4. Pt's vanco trough supposed to be drawn before 3rd dose, RN already administered vanco before realizing that trough is supposed to be drawn
Pt A+Ox4, denies cp sob at this time, two large loose BMs today.
Pt tachycardic at 120's beats per minute. Pt asymptomatic, vitals as charted
Pt AOx2, 3L NC. No signs of chest pain or shortness of breath. Patient wants comfort care.
Pt Axox3, 3LNC. Pt refusing AM blood draw.
Pt A&O3, VSS, Denies chest pain, sob, and dizziness.
Pt is a 81y/o female DNR/DNR A&O2 with c/o 10/10 generalized cancer pain
Pt A&Ox4, pt states that she is all bruised and doesn't want anybody poking her. Pt's IVL site flushing well, no s/s of infection.
Pt left chest wall port leaking and dressing filled with blood. Left chest wall port changed by certified RN prior during shift. Pt A&O4, asymptomatic.
No Response to external Stimuli  No spontaneous respirations  No apical Heart rate  Negative papillary response

## 2023-02-21 NOTE — PROGRESS NOTE ADULT - SUBJECTIVE AND OBJECTIVE BOX
[facility]   MD RONEL FRIAS MD, DO KARELY DANIELSON NP    TEL:  OFFICE: [phone]    From 5pm-7am Answering Service [phone]    -- RENAL FOLLOW UP NOTE ---Date of Service 02-21-23 @ 15:16    Patient is a 82y old  Female who presents with a chief complaint of weakness (21 Feb 2023 10:36)      Patient seen and examined PCU.     VITALS:  T(F): 98.1 (02-21-23 @ 08:05), Max: 98.1 (02-21-23 @ 08:05)  HR: 126 (02-21-23 @ 08:05)  BP: 94/56 (02-21-23 @ 08:05)  RR: 20 (02-21-23 @ 08:05)  SpO2: 81% (02-21-23 @ 08:05)  Wt(kg): --    02-20 @ 07:01  -  02-21 @ 07:00  --------------------------------------------------------  IN: 0 mL / OUT: 300 mL / NET: -300 mL          PHYSICAL EXAM:  Constitutional: NAD  Neck: No JVD  Respiratory: CTAB, no wheezes, rales or rhonchi  Cardiovascular: S1, S2, RRR  Gastrointestinal: BS+, soft, NT/ND  Extremities: No peripheral edema    Hospital Medications:   MEDICATIONS  (STANDING):  acyclovir IVPB 260 milliGRAM(s) IV Intermittent every 8 hours  chlorhexidine 2% Cloths 1 Application(s) Topical daily  HYDROmorphone  Injectable 0.5 milliGRAM(s) IV Push every 6 hours      LABS:        Creatinine Trend: 0.58 <--, 0.72 <--, 0.53 <--, 0.65 <--            Urine Studies:  Urinalysis - [02-01-23 @ 09:57]      Color Yellow / Appearance Slightly Turbid / SG 1.033 / pH 7.0      Gluc Negative / Ketone Trace  / Bili Negative / Urobili Negative       Blood Small / Protein 300 mg/dL / Leuk Est Negative / Nitrite Negative      RBC 4 /  / Hyaline 4 / Gran  / Sq Epi  / Non Sq Epi 10 / Bacteria Moderate      Iron 152, TIBC 180, %sat 84      [01-19-23 @ 11:19]  Ferritin 5768      [01-19-23 @ 11:19]  Vitamin D (25OH) 28.1      [01-19-23 @ 11:19]  Lipid: chol 84, TG 81, HDL 22, LDL --      [10-01-22 @ 07:10]    HBsAg Nonreact      [01-25-23 @ 07:18]  HBcAb Nonreact      [01-25-23 @ 07:18]  HCV 0.15, Nonreact      [01-25-23 @ 07:18]    MPO-ANCA <5.0, interpretation: Negative      [02-02-23 @ 07:22]  PR3-ANCA <5.0, interpretation: Negative      [02-02-23 @ 07:22]  Free Light Chains: kappa 3.32, lambda 2.61, ratio = 1.27      [02-17 @ 06:35]    RADIOLOGY & ADDITIONAL STUDIES:

## 2023-02-21 NOTE — PROGRESS NOTE ADULT - PROBLEM SELECTOR PLAN 5
PPSV2: 10%  -Patient requires total care   -Frequent repositioning for secretions management.  -Good skin care as per floor policy Pulmonary vasculitis -   - CT showed s/p wedge resections in b/l upper lobes, 2 cm nodular opacity a/w staple line in RUL; multiple ill-defined b/l opacities more in RLL -unclear etiology, b/l effusions R>L  - rheum note reviewed - family and patient defer further treatment and opt to defer further tx.

## 2023-02-21 NOTE — PROVIDER CONTACT NOTE (OTHER) - RECOMMENDATIONS
Family focused on comfort measures for pt, I reinforced need for palliative approach with MARGRET Phillips and more rigourous approach of pain medication regime
Notify provider
Patient pronounced dead @ 1754 surrounded by her children
Notify provider.
Notify provider.
Notify ACP
Notify provider

## 2023-02-21 NOTE — PROGRESS NOTE ADULT - ATTENDING SUPERVISION STATEMENT
Fellow

## 2023-02-21 NOTE — PROVIDER CONTACT NOTE (OTHER) - BACKGROUND
Pt diagnosed with anemia. Past medical history of B cell lymphoma
Pt admitted for anemia
Pt admitted for anemia
Admitted for Anemia
Pt 81 yo female admitted for anemia and sepsis
Patient has a DNR
Pt 81 yo female admitted for sepsis and anemia
Pt admitted for anemia
Pt admitted for anemia.
82 yr old female with a PMHx of diffuse large B cell lymphoma in remission, non-hodgkin's lymphoma  depression, HLD, GERD, PE/DVT

## 2023-02-21 NOTE — PROGRESS NOTE ADULT - PROBLEM SELECTOR PLAN 7
SHALOM review and clarified in chart. It was signed by primary team and patient, but due to the fact that patient was unable to sign, patients surrogate Martin signed it for her. Corrections and amendments made to reflect this w/ assistance of Dr. Arias. 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

## 2023-02-21 NOTE — PROGRESS NOTE ADULT - NSPROGADDITIONALINFOA_GEN_ALL_CORE
Still requiring frequent transfusions. overall critically ill. Prognosis is poor given two diagnosis of pulmonary vasculitis as well as new dx of MDS.    she started having pain overnight over the weekend  (lips and generalize and in her legs)  tylenol didn't help, IV Diladid helps. ordered PRN given pt is opioid naive.   Pt and the two daughters requesting comfort care.  Pt states She can no longer proceed, and want to be at peace.   denied all blood draws and further transfusions.  she is requesting comfort care.  Palliative consult placed.      Met with the two daughters: Kathleen and Leeann at bedside.  Pt's best friend also at bedside.  Palliative evaluating for PCU transfer. Accepted.   changing PRN dilaudid to ATC.  all questions answered.     Medicine will sign off. Please call us with any questions or if we could be any assistance.  Palliative team care appreciated.     - Dr. MARY Arias ([facility])  - [phone]

## 2023-02-21 NOTE — DISCHARGE NOTE FOR THE EXPIRED PATIENT - HOSPITAL COURSE
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 Readmitted from [facility] for elevated WBC and low hemoglobin, severe weakness. Pt no longer to receive treatment with rheumatology for her vasculitis and family requested palliative care consult. Patient subsequently transferred to PCU for symptom directed care. The patient  on 23 at 17:54.

## 2023-02-21 NOTE — PROGRESS NOTE ADULT - ASSESSMENT
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 She presented here from [facility] for elevated WBC and low hemoglobin, severe weakness. Pt states for the past 2-3 days has been having increased weakness and lethargy, decreased appetite. +sputum productive cough. + cui, no sob, no difficulty breathing. No abdominal pain, nausea, vomiting, no bloody stools. Has endorsed multiple episodes of loose stools x weeks, currently being treated for c.diff with oral vancomycin. Nephrology consulted for Hyponatremia.       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

## 2023-02-21 NOTE — PROGRESS NOTE ADULT - PROBLEM SELECTOR PLAN 8
Continue to monitor in the PCU for symptom directed care. Family at bedside, updated. SHALOM review and clarified in chart. It was signed by primary team and patient, but due to the fact that patient was unable to sign, patients surrogate Martin signed it for her. Corrections and amendments made to reflect this w/ assistance of Dr. Arias.

## 2023-02-21 NOTE — PROGRESS NOTE ADULT - TIME BILLING
Agree with above PA note.  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 She presented here from [facility] for elevated WBC and low hemoglobin, severe weakness.    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
Total time spent in care of patient and family as noted below:    [ x] Preparation to see the patient  [ ] Obtained and/or reviewed separately obtained history  [x ] Performed a medically appropriate examination and/or evaluation  [x ] Counseled and educated the patient/family/caregiver  [x ] Ordered medications, tests, or procedures  [x ] Referred and communicated with other health care professionals  [ x] Documentation of clinical information in the electronic or other health care record  [x ] Independently interpreted results and communicated results to patient/family/caregiver  [ x] Care coordination

## 2023-02-21 NOTE — PROVIDER CONTACT NOTE (OTHER) - REASON
Patient Chest wall [place] and dressing filled with blood
Pt refused labs to be drawn and refused for her IV to be changed
Pt/ family refusal of morning labs, vitals and medications
Pt refused oral medications and vital signs throughout day
RN missed vanco trough
Pt refusing bowel regimen
Pt tachycardic at 120's beats per minute
Pt refusing AM blood draws
Pt refusing to get blood drawn
Patient expiration note

## 2023-02-21 NOTE — PROVIDER CONTACT NOTE (OTHER) - DATE AND TIME:
20-Feb-2023 04:15
21-Feb-2023 18:00
16-Feb-2023 01:15
24-Jan-2023 14:33
03-Feb-2023 11:36
16-Feb-2023 04:35
09-Feb-2023 18:24
29-Jan-2023 14:11
19-Feb-2023 06:25
19-Feb-2023 12:00

## 2023-02-21 NOTE — PROGRESS NOTE ADULT - PROBLEM SELECTOR PLAN 2
PRNs used q24hr (8a-8a): 1x Dilaudid 0.5 mg for shortness of breath   -c/w Dilaudid 0.5 mg q1hr PRN for dyspnea  -add Robinul 0.4 mg q6hr PRN for secretions   -Bowel regimen while on opiates -add Robinul 0.4 mg q6hr PRN for secretions, scop patch if refractory.  Mouth care.

## 2023-02-21 NOTE — PROVIDER CONTACT NOTE (OTHER) - NAME OF MD/NP/PA/DO NOTIFIED:
MARGRET Santa
MARIA English
NP Renate Pascual
PCU Team
Aline Haley
MARGRET Wei
Aline Haley
MARIA Rush
Ni-Natividad Westmoreland
MARGRET Parisi

## 2023-02-21 NOTE — PROGRESS NOTE ADULT - PROBLEM SELECTOR PLAN 4
Pulmonary vasculitis -   - CT showed s/p wedge resections in b/l upper lobes, 2 cm nodular opacity a/w staple line in RUL; multiple ill-defined b/l opacities more in RLL -unclear etiology, b/l effusions R>L  - rheum note reviewed - family and patient defer further treatment and opt to defer further tx. PRNs used q24hr (8a-8a): 2x Ativan 0.25 mg   -c/w Ativan 0.25 mg IV q1hr PRN for agitation     Monitor for constipation, urinary retention, pain, hunger, thirst, etc.  Promote sleep wake cycle and reorientation as indicated.

## 2023-02-21 NOTE — PROGRESS NOTE ADULT - REASON FOR ADMISSION
weakness

## 2023-02-21 NOTE — PROGRESS NOTE ADULT - PROBLEM SELECTOR PLAN 6
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  -Good skin care as per floor policy

## 2023-02-21 NOTE — PROGRESS NOTE ADULT - ASSESSMENT
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 Readmitted from [facility] for elevated WBC and low hemoglobin, severe weakness. Pt no longer to receive treatment with rheumatology for her vasculitis and family requested palliative care consult. Patient subsequently transferred to PCU for symptom directed care.

## 2023-02-21 NOTE — CHART NOTE - NSCHARTNOTEFT_GEN_A_CORE
Interim Note    Per RN staff, pt inappropriate for nutrition follow up at this time.    RD remains available.  Yaneth Gay MS, RD, CDN, [facility] Pager #[phone]

## 2023-02-21 NOTE — PROGRESS NOTE ADULT - SUBJECTIVE AND OBJECTIVE BOX
GAP TEAM PALLIATIVE CARE UNIT PROGRESS NOTE:      [  ] Patient on hospice program.    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 She presented here from [facility] for elevated WBC and low hemoglobin, severe weakness. Pt no longer to receive treatment with rheumatology for her vasculitis. Patient transferred to PCU for symptom directed care on 2/20.     OVERNIGHT EVENTS: No acute overnight events. PRNs used q24hr (8a-8a): Dilaudid 0.5 mg IV x 1 for severe pain, Dilaudid 0.5 mg IV x 1 for dyspnea, Ativan 0.25 mg IV x 2 for agitation. Patient is hypotensive this morning, tachycardic, O2 sats low.     DNR on chart: Yes  Yes    Allergies    codeine (Nausea)  doxycycline (Nausea)  penicillin (Hives)    Intolerances    MEDICATIONS  (STANDING):  acyclovir IVPB 260 milliGRAM(s) IV Intermittent every 8 hours  chlorhexidine 2% Cloths 1 Application(s) Topical daily  HYDROmorphone  Injectable 0.5 milliGRAM(s) IV Push every 6 hours    MEDICATIONS  (PRN):  acetaminophen   IVPB .. 1000 milliGRAM(s) IV Intermittent once PRN Temp greater or equal to 38C (100.4F)  HYDROmorphone  Injectable 0.5 milliGRAM(s) IV Push every 1 hour PRN Severe Pain (7 - 10)  HYDROmorphone  Injectable 0.2 milliGRAM(s) IV Push every 1 hour PRN Moderate Pain (4 - 6)  HYDROmorphone  Injectable 0.5 milliGRAM(s) IV Push every 1 hour PRN dyspnea  LORazepam   Injectable 0.25 milliGRAM(s) IV Push every 1 hour PRN Anxiety  nystatin Powder 1 Application(s) Topical two times a day PRN skin irritation  ondansetron Injectable 4 milliGRAM(s) IV Push every 8 hours PRN Nausea and/or Vomiting  zinc oxide 40% Paste 1 Application(s) Topical two times a day PRN skin irritation    ITEMS UNCHECKED ARE NOT PRESENT    PRESENT SYMPTOMS: [x]Unable to self-report see PAINAD, RDOS below  Source if other than patient:  [ ]Family   [ ]Team     Pain: [ ] yes [ ] no  QOL impact -   Location -                    Aggravating factors -  Quality -  Radiation -  Timing-  Severity (0-10 scale):  Minimal acceptable level (0-10 scale):     Dyspnea:                           [ ]Mild [ ]Moderate [ ]Severe  Anxiety:                             [ ]Mild [ ]Moderate [ ]Severe  Fatigue:                             [ ]Mild [ ]Moderate [ ]Severe  Nausea:                             [ ]Mild [ ]Moderate [ ]Severe  Loss of appetite:              [ ]Mild [ ]Moderate [ ]Severe  Constipation:                    [ ]Mild [ ]Moderate [ ]Severe    PCSSQ [Palliative Care Spiritual Screening Question]   Severity (0-10):  Score of 4 or > indicate consideration of Chaplaincy referral.  Chaplaincy Referral: [x] yes [ ] refused [ ] following [ ] deferred    Caregiver [place]? : [x] yes [ ] no [ ] deferred:  Social work referral [x] Patient & Family Centered Care Referral [ ]     Anticipatory Grief present?:  [x] yes [ ] no [ ] deferred:  Social work referral [x] Patient & Family Centered Care Referral [ ]  	  Other Symptoms:  [x]All other review of systems negative - unable to assess d/t patient mental status     PHYSICAL EXAM:   Vital Signs Last 24 Hrs  T(C): 36.7 (21 Feb 2023 08:05), Max: 36.7 (21 Feb 2023 08:05)  T(F): 98.1 (21 Feb 2023 08:05), Max: 98.1 (21 Feb 2023 08:05)  HR: 126 (21 Feb 2023 08:05) (121 - 126)  BP: 94/56 (21 Feb 2023 08:05) (94/56 - 121/54)  BP(mean): --  RR: 20 (21 Feb 2023 08:05) (18 - 20)  SpO2: 81% (21 Feb 2023 08:05) (81% - 99%)    Parameters below as of 21 Feb 2023 08:05  Patient On (Oxygen Delivery Method): room air     I&O's Summary    20 Feb 2023 07:01  -  21 Feb 2023 07:00  --------------------------------------------------------  IN: 0 mL / OUT: 300 mL / NET: -300 mL      GENERAL: [x] Cachexia  [ ]Alert  [ ]Oriented x   [x]Lethargic  [ ]Unarousable  [ ]Verbal  [ ]Non-Verbal  Behavioral:   [ ] Anxiety  [ ] Delirium [ ] Agitation [ ] Other  HEENT:  [ ]Normal   [x]Dry mouth   [ ]ET Tube/Trach  [x]Oral lesions - herpetic lesions   PULMONARY:   [ ]Clear [ ]Tachypnea  [ ]Audible excessive secretions   [ ]Rhonchi        [ ]Right [ ]Left [ ]Bilateral  [ ]Crackles        [ ]Right [ ]Left [ ]Bilateral  [ ]Wheezing     [ ]Right [ ]Left [ ]Bilateral  [x]Diminished BS [ ]Right [ ]Left [x]Bilateral    CARDIOVASCULAR:    [ ]Regular [ ]Irregular [x]Tachy  [ ]Jaime [ ]Murmur [ ]Other  GASTROINTESTINAL:  [x]Soft  [ ]Distended   [ ]+BS  [x]Non tender [ ]Tender  [ ]Other [ ]PEG [ ]OGT/ NGT   Last BM:    GENITOURINARY:  [ ]Normal [x] Incontinent   [ ]Oliguria/Anuria   [x]Stephenson  MUSCULOSKELETAL:   [ ]Normal   [ ]Weakness  [x]Bed/Wheelchair bound [ ]Edema  NEUROLOGIC:   [ ]No focal deficits  [x] Cognitive impairment  [ ] Dysphagia [ ]Dysarthria [ ] Paresis [ ]Other   SKIN:   [ ]Normal  [x]Rash  [x]Other - numerous skin tears noted   [ ]Pressure ulcer(s)  [ ]y [ ]n  Present on admission      CRITICAL CARE:  [ ] Shock Present  [ ]Septic [ ]Cardiogenic [ ]Neurologic [ ]Hypovolemic  [ ]  Vasopressors [ ]  Inotropes   [ ] Respiratory failure present [ ] Mechanical Ventilation [ ] Non-invasive ventilatory support [ ] High-Flow  [ ] Acute  [ ] Chronic [ ] Hypoxic  [ ] Hypercarbic [ ] Other  [ ] Other organ failure     LABS: No new labs    RADIOLOGY & ADDITIONAL STUDIES: No new imaging     PROTEIN CALORIE MALNUTRITION: [ ] mild [ ] moderate [x] severe  [x] underweight [ ] morbid obesity    https://www.andeal.org/vault/1730/web/files/ONC/Table_Clinical%20Characteristics%20to%20Document%20Malnutrition-White%20JV%20et%20al%202012.pdf    Height (cm): 152.4 (01-19-23 @ 13:37), 147.3 (11-10-22 @ 02:06), 154.9 (10-04-22 @ 11:51)  Weight (kg): 52.2 (01-19-23 @ 13:37), 58.1 (11-10-22 @ 02:06), 62.6 (10-04-22 @ 12:30)  BMI (kg/m2): 22.5 (01-19-23 @ 13:37), 26.8 (11-10-22 @ 02:06), 26.1 (10-04-22 @ 12:30)    [ ] PPSV2 < or = 30% [ ] significant weight loss [ ] poor nutritional intake [ ] anasarca   Artificial Nutrition [ ]     Other REFERRALS:    [ ] Hospice  [ ]Child Life  [x]Social Work  [ ]Case management [ ]Holistic Therapy [ ] Physical Therapy [ ] Dietary     Progress Notes - Care Coordination [C. Provider] (02-17-23 @ 16:43)      Palliative Performance Scale:  http://npcrc.org/files/news/palliative_performance_scale_ppsv2.pdf  (Ctrl +  left click to view)  Respiratory Distress Observation Tool:  https://homecareinformation.net/handouts/hen/Respiratory_Distress_Observation_Scale.pdf (Ctrl +  left click to view)  PAINAD Score:  http://[email or web address]/cog/painad.pdf (Ctrl +  left click to view)   GAP TEAM PALLIATIVE CARE UNIT PROGRESS NOTE:      [  ] Patient on hospice program.    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 She presented here from [facility] for elevated WBC and low hemoglobin, severe weakness. Pt no longer to receive treatment with rheumatology for her vasculitis. Patient transferred to PCU for symptom directed care on 2/20.     OVERNIGHT EVENTS: No acute overnight events. PRNs used q24hr (8a-8a): Dilaudid 0.5 mg IV x 1 for severe pain, Dilaudid 0.5 mg IV x 1 for dyspnea, Ativan 0.25 mg IV x 2 for agitation. Patient is hypotensive this morning, tachycardic, O2 sats low.     DNR on chart: Yes  Yes    Allergies    codeine (Nausea)  doxycycline (Nausea)  penicillin (Hives)    Intolerances    MEDICATIONS  (STANDING):  acyclovir IVPB 260 milliGRAM(s) IV Intermittent every 8 hours  chlorhexidine 2% Cloths 1 Application(s) Topical daily  HYDROmorphone  Injectable 0.5 milliGRAM(s) IV Push every 6 hours    MEDICATIONS  (PRN):  acetaminophen   IVPB .. 1000 milliGRAM(s) IV Intermittent once PRN Temp greater or equal to 38C (100.4F)  HYDROmorphone  Injectable 0.5 milliGRAM(s) IV Push every 1 hour PRN Severe Pain (7 - 10)  HYDROmorphone  Injectable 0.2 milliGRAM(s) IV Push every 1 hour PRN Moderate Pain (4 - 6)  HYDROmorphone  Injectable 0.5 milliGRAM(s) IV Push every 1 hour PRN dyspnea  LORazepam   Injectable 0.25 milliGRAM(s) IV Push every 1 hour PRN Anxiety  nystatin Powder 1 Application(s) Topical two times a day PRN skin irritation  ondansetron Injectable 4 milliGRAM(s) IV Push every 8 hours PRN Nausea and/or Vomiting  zinc oxide 40% Paste 1 Application(s) Topical two times a day PRN skin irritation    ITEMS UNCHECKED ARE NOT PRESENT    PRESENT SYMPTOMS: [x]Unable to self-report see PAINAD, RDOS below  Source if other than patient:  [ ]Family   [ ]Team     Pain: [ ] yes [ ] no  QOL impact -   Location -                    Aggravating factors -  Quality -  Radiation -  Timing-  Severity (0-10 scale):  Minimal acceptable level (0-10 scale):     Dyspnea:                           [ ]Mild [ ]Moderate [ ]Severe  Anxiety:                             [ ]Mild [ ]Moderate [ ]Severe  Fatigue:                             [ ]Mild [ ]Moderate [ ]Severe  Nausea:                             [ ]Mild [ ]Moderate [ ]Severe  Loss of appetite:              [ ]Mild [ ]Moderate [ ]Severe  Constipation:                    [ ]Mild [ ]Moderate [ ]Severe    PCSSQ [Palliative Care Spiritual Screening Question]   Severity (0-10):  Score of 4 or > indicate consideration of Chaplaincy referral.  Chaplaincy Referral: [x] yes [ ] refused [ ] following [ ] deferred    Caregiver [place]? : [x] yes [ ] no [ ] deferred:  Social work referral [x] Patient & Family Centered Care Referral [ ]     Anticipatory Grief present?:  [x] yes [ ] no [ ] deferred:  Social work referral [x] Patient & Family Centered Care Referral [ ]  	  Other Symptoms:  [x]All other review of systems negative - unable to assess d/t patient mental status     PHYSICAL EXAM:   Vital Signs Last 24 Hrs  T(C): 36.7 (21 Feb 2023 08:05), Max: 36.7 (21 Feb 2023 08:05)  T(F): 98.1 (21 Feb 2023 08:05), Max: 98.1 (21 Feb 2023 08:05)  HR: 126 (21 Feb 2023 08:05) (121 - 126)  BP: 94/56 (21 Feb 2023 08:05) (94/56 - 121/54)  BP(mean): --  RR: 20 (21 Feb 2023 08:05) (18 - 20)  SpO2: 81% (21 Feb 2023 08:05) (81% - 99%)    Parameters below as of 21 Feb 2023 08:05  Patient On (Oxygen Delivery Method): room air     I&O's Summary    20 Feb 2023 07:01  -  21 Feb 2023 07:00  --------------------------------------------------------  IN: 0 mL / OUT: 300 mL / NET: -300 mL      GENERAL: [x] Cachexia  [ ]Alert  [ ]Oriented x   [ ]Lethargic  [x]Unarousable  [ ]Verbal  [ ]Non-Verbal  Behavioral:   [ ] Anxiety  [ ] Delirium [ ] Agitation [x] Other  HEENT:  [ ]Normal   [x]Dry mouth   [ ]ET Tube/Trach  [x]Oral lesions - herpetic lesions   PULMONARY:   [ ]Clear [ ]Tachypnea  [ ]Audible excessive secretions   [ ]Rhonchi        [ ]Right [ ]Left [ ]Bilateral  [ ]Crackles        [ ]Right [ ]Left [ ]Bilateral  [ ]Wheezing     [ ]Right [ ]Left [ ]Bilateral  [x]Diminished BS [ ]Right [ ]Left [x]Bilateral    CARDIOVASCULAR:    [ ]Regular [ ]Irregular [x]Tachy  [ ]Jaime [ ]Murmur [ ]Other  GASTROINTESTINAL:  [x]Soft  [ ]Distended   [ ]+BS  [x]Non tender [ ]Tender  [ ]Other [ ]PEG [ ]OGT/ NGT   Last BM: 2/21   GENITOURINARY:  [ ]Normal [x] Incontinent   [ ]Oliguria/Anuria   [x]Stephenson  MUSCULOSKELETAL:   [ ]Normal   [ ]Weakness  [x]Bed/Wheelchair bound [ ]Edema  NEUROLOGIC:   [ ]No focal deficits  [x] Cognitive impairment  [ ] Dysphagia [ ]Dysarthria [ ] Paresis [ ]Other   SKIN:   [ ]Normal  [x]Rash  [x]Other - numerous skin tears noted   [ ]Pressure ulcer(s)  [ ]y [ ]n  Present on admission      CRITICAL CARE:  [ ] Shock Present  [ ]Septic [ ]Cardiogenic [ ]Neurologic [ ]Hypovolemic  [ ]  Vasopressors [ ]  Inotropes   [ ] Respiratory failure present [ ] Mechanical Ventilation [ ] Non-invasive ventilatory support [ ] High-Flow  [ ] Acute  [ ] Chronic [ ] Hypoxic  [ ] Hypercarbic [ ] Other  [ ] Other organ failure     LABS: No new labs    RADIOLOGY & ADDITIONAL STUDIES: No new imaging     PROTEIN CALORIE MALNUTRITION: [ ] mild [ ] moderate [x] severe  [x] underweight [ ] morbid obesity    https://www.andeal.org/vault/2440/web/files/ONC/Table_Clinical%20Characteristics%20to%20Document%20Malnutrition-White%20JV%20et%20al%202012.pdf    Height (cm): 152.4 (01-19-23 @ 13:37), 147.3 (11-10-22 @ 02:06), 154.9 (10-04-22 @ 11:51)  Weight (kg): 52.2 (01-19-23 @ 13:37), 58.1 (11-10-22 @ 02:06), 62.6 (10-04-22 @ 12:30)  BMI (kg/m2): 22.5 (01-19-23 @ 13:37), 26.8 (11-10-22 @ 02:06), 26.1 (10-04-22 @ 12:30)    [ ] PPSV2 < or = 30% [ ] significant weight loss [ ] poor nutritional intake [ ] anasarca   Artificial Nutrition [ ]     Other REFERRALS:    [ ] Hospice  [ ]Child Life  [x]Social Work  [ ]Case management [ ]Holistic Therapy [ ] Physical Therapy [ ] Dietary     Progress Notes - Care Coordination [C. Provider] (02-17-23 @ 16:43)      Palliative Performance Scale:  http://npcrc.org/files/news/palliative_performance_scale_ppsv2.pdf  (Ctrl +  left click to view)  Respiratory Distress Observation Tool:  https://homecareinformation.net/handouts/hen/Respiratory_Distress_Observation_Scale.pdf (Ctrl +  left click to view)  PAINAD Score:  http://[email or web address].[email or web address]/cog/painad.pdf (Ctrl +  left click to view)   GAP TEAM PALLIATIVE CARE UNIT PROGRESS NOTE:      [  ] Patient on hospice program.    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 She presented here from [facility] for elevated WBC and low hemoglobin, severe weakness. Pt no longer to receive treatment with rheumatology for her vasculitis. Patient transferred to PCU for symptom directed care on 2/20.     OVERNIGHT EVENTS: No acute overnight events. PRNs used q24hr (8a-8a): Dilaudid 0.5 mg IV x 1 for severe pain, Dilaudid 0.5 mg IV x 1 for dyspnea, Ativan 0.25 mg IV x 2 for agitation. Patient is hypotensive this morning, tachycardic, O2 sats low.     DNR on chart: Yes  Yes    Allergies    codeine (Nausea)  doxycycline (Nausea)  penicillin (Hives)    Intolerances    MEDICATIONS  (STANDING):  acyclovir IVPB 260 milliGRAM(s) IV Intermittent every 8 hours  chlorhexidine 2% Cloths 1 Application(s) Topical daily  HYDROmorphone  Injectable 0.5 milliGRAM(s) IV Push every 6 hours    MEDICATIONS  (PRN):  acetaminophen   IVPB .. 1000 milliGRAM(s) IV Intermittent once PRN Temp greater or equal to 38C (100.4F)  HYDROmorphone  Injectable 0.5 milliGRAM(s) IV Push every 1 hour PRN Severe Pain (7 - 10)  HYDROmorphone  Injectable 0.2 milliGRAM(s) IV Push every 1 hour PRN Moderate Pain (4 - 6)  HYDROmorphone  Injectable 0.5 milliGRAM(s) IV Push every 1 hour PRN dyspnea  LORazepam   Injectable 0.25 milliGRAM(s) IV Push every 1 hour PRN Anxiety  nystatin Powder 1 Application(s) Topical two times a day PRN skin irritation  ondansetron Injectable 4 milliGRAM(s) IV Push every 8 hours PRN Nausea and/or Vomiting  zinc oxide 40% Paste 1 Application(s) Topical two times a day PRN skin irritation    ITEMS UNCHECKED ARE NOT PRESENT    PRESENT SYMPTOMS: [x]Unable to self-report see PAINAD, RDOS below  Source if other than patient:  [ ]Family   [ ]Team     Pain: [ ] yes [ ] no  QOL impact -   Location -                    Aggravating factors -  Quality -  Radiation -  Timing-  Severity (0-10 scale):  Minimal acceptable level (0-10 scale):     Dyspnea:                           [ ]Mild [ ]Moderate [ ]Severe  Anxiety:                             [ ]Mild [ ]Moderate [ ]Severe  Fatigue:                             [ ]Mild [ ]Moderate [ ]Severe  Nausea:                             [ ]Mild [ ]Moderate [ ]Severe  Loss of appetite:              [ ]Mild [ ]Moderate [ ]Severe  Constipation:                    [ ]Mild [ ]Moderate [ ]Severe    PCSSQ [Palliative Care Spiritual Screening Question]   Severity (0-10):  Score of 4 or > indicate consideration of Chaplaincy referral.  Chaplaincy Referral: [x] yes [ ] refused [ ] following [ ] deferred    Caregiver [place]? : [x] yes [ ] no [ ] deferred:  Social work referral [x] Patient & Family Centered Care Referral [ ]     Anticipatory Grief present?:  [x] yes [ ] no [ ] deferred:  Social work referral [x] Patient & Family Centered Care Referral [ ]  	  Other Symptoms:  [x]All other review of systems negative - unable to assess d/t patient mental status     PHYSICAL EXAM:   Vital Signs Last 24 Hrs  T(C): 36.7 (21 Feb 2023 08:05), Max: 36.7 (21 Feb 2023 08:05)  T(F): 98.1 (21 Feb 2023 08:05), Max: 98.1 (21 Feb 2023 08:05)  HR: 126 (21 Feb 2023 08:05) (121 - 126)  BP: 94/56 (21 Feb 2023 08:05) (94/56 - 121/54)  BP(mean): --  RR: 20 (21 Feb 2023 08:05) (18 - 20)  SpO2: 81% (21 Feb 2023 08:05) (81% - 99%)    Parameters below as of 21 Feb 2023 08:05  Patient On (Oxygen Delivery Method): room air     I&O's Summary    20 Feb 2023 07:01  -  21 Feb 2023 07:00  --------------------------------------------------------  IN: 0 mL / OUT: 300 mL / NET: -300 mL      GENERAL: [x] Cachexia  [ ]Alert  [ ]Oriented x   [ ]Lethargic  [x]Unarousable  [ ]Verbal  [ ]Non-Verbal  Behavioral:   [ ] Anxiety  [ ] Delirium [ ] Agitation [x] Other  HEENT:  [ ]Normal   [x]Dry mouth   [ ]ET Tube/Trach  [x]Oral lesions - herpetic lesions   PULMONARY:   [ ]Clear [ ]Tachypnea  [ ]Audible excessive secretions   [ ]Rhonchi        [ ]Right [ ]Left [ ]Bilateral  [ ]Crackles        [ ]Right [ ]Left [ ]Bilateral  [ ]Wheezing     [ ]Right [ ]Left [ ]Bilateral  [x]Diminished BS [ ]Right [ ]Left [x]Bilateral    CARDIOVASCULAR:    [ ]Regular [ ]Irregular [x]Tachy  [ ]Jaime [ ]Murmur [ ]Other  GASTROINTESTINAL:  [x]Soft  [ ]Distended   [x ]+BS  [x]Non tender [ ]Tender  [ ]Other [ ]PEG [ ]OGT/ NGT   Last BM: 2/21   GENITOURINARY:  [x ]Normal [x] Incontinent   [ ]Oliguria/Anuria   [x]Stephenson  MUSCULOSKELETAL:   [ ]Normal   [ ]Weakness  [x]Bed/Wheelchair bound [ ]Edema  NEUROLOGIC:   [ ]No focal deficits  [x] Cognitive impairment  [ ] Dysphagia [ ]Dysarthria [ ] Paresis [ ]Other   SKIN:   [ ]Normal  [x]Rash  [x]Other - numerous skin tears noted   [ ]Pressure ulcer(s)  [ ]y [ ]n  Present on admission      CRITICAL CARE:  [ ] Shock Present  [ ]Septic [ ]Cardiogenic [ ]Neurologic [ ]Hypovolemic  [ ]  Vasopressors [ ]  Inotropes   [ ] Respiratory failure present [ ] Mechanical Ventilation [ ] Non-invasive ventilatory support [ ] High-Flow  [ ] Acute  [ ] Chronic [ ] Hypoxic  [ ] Hypercarbic [ ] Other  [ ] Other organ failure     LABS: No new labs     RADIOLOGY & ADDITIONAL STUDIES: I have reviewed all pertinent imaging, laboratory and microbiology studies at this visit as this patient is new to me.     PROTEIN CALORIE MALNUTRITION: [ ] mild [ ] moderate [x] severe  [x] underweight [ ] morbid obesity    https://www.andeal.org/vault/2440/web/files/ONC/Table_Clinical%20Characteristics%20to%20Document%20Malnutrition-White%20JV%20et%20al%731809.pdf    Height (cm): 152.4 (01-19-23 @ 13:37), 147.3 (11-10-22 @ 02:06), 154.9 (10-04-22 @ 11:51)  Weight (kg): 52.2 (01-19-23 @ 13:37), 58.1 (11-10-22 @ 02:06), 62.6 (10-04-22 @ 12:30)  BMI (kg/m2): 22.5 (01-19-23 @ 13:37), 26.8 (11-10-22 @ 02:06), 26.1 (10-04-22 @ 12:30)    [x ] PPSV2 < or = 30% [ ] significant weight loss [x ] poor nutritional intake [ ] anasarca   Artificial Nutrition [ ]     Other REFERRALS:    [ ] Hospice  [ ]Child Life  [x]Social Work  [ ]Case management [ ]Holistic Therapy [ ] Physical Therapy [ ] Dietary     Progress Notes - Care Coordination [C. Provider] (02-17-23 @ 16:43)      Palliative Performance Scale:  http://npcrc.org/files/news/palliative_performance_scale_ppsv2.pdf  (Ctrl +  left click to view)  Respiratory Distress Observation Tool:  https://homecareinformation.net/handouts/hen/Respiratory_Distress_Observation_Scale.pdf (Ctrl +  left click to view)  PAINAD Score:  http://geriatrictoolkit.[email or web address].[facility]/cog/painad.pdf (Ctrl +  left click to view)

## 2023-02-21 NOTE — PROGRESS NOTE ADULT - SUBJECTIVE AND OBJECTIVE BOX
SUBJECTIVE/ OVERNIGHT EVENTS:  comfortable  she is transferred to PCU.  the daughter Leeann at bedside.  on IV Dilaudid.     --------------------------------------------------------------------------------------------  LABS:            CAPILLARY BLOOD GLUCOSE                RADIOLOGY & ADDITIONAL TESTS:    Imaging Personally Reviewed:  [x] YES  [ ] NO    Consultant(s) Notes Reviewed:  [x] YES  [ ] NO    MEDICATIONS  (STANDING):  acyclovir IVPB 260 milliGRAM(s) IV Intermittent every 8 hours  chlorhexidine 2% Cloths 1 Application(s) Topical daily  HYDROmorphone  Injectable 1 milliGRAM(s) IV Push every 6 hours    MEDICATIONS  (PRN):  acetaminophen   IVPB .. 1000 milliGRAM(s) IV Intermittent once PRN Temp greater or equal to 38C (100.4F)  glycopyrrolate Injectable 0.4 milliGRAM(s) IV Push every 6 hours PRN secretions  HYDROmorphone  Injectable 1 milliGRAM(s) IV Push every 1 hour PRN dyspnea  HYDROmorphone  Injectable 1 milliGRAM(s) IV Push every 1 hour PRN Severe Pain (7 - 10)  HYDROmorphone  Injectable 0.5 milliGRAM(s) IV Push every 1 hour PRN Moderate Pain (4 - 6)  LORazepam   Injectable 0.25 milliGRAM(s) IV Push every 1 hour PRN Anxiety  nystatin Powder 1 Application(s) Topical two times a day PRN skin irritation  ondansetron Injectable 4 milliGRAM(s) IV Push every 8 hours PRN Nausea and/or Vomiting  zinc oxide 40% Paste 1 Application(s) Topical two times a day PRN skin irritation      Care Discussed with Consultants/Other Providers [x] YES  [ ] NO    Vital Signs Last 24 Hrs  T(C): 36.7 (21 Feb 2023 08:05), Max: 36.7 (21 Feb 2023 08:05)  T(F): 98.1 (21 Feb 2023 08:05), Max: 98.1 (21 Feb 2023 08:05)  HR: 126 (21 Feb 2023 08:05) (121 - 126)  BP: 94/56 (21 Feb 2023 08:05) (94/56 - 121/54)  BP(mean): --  RR: 20 (21 Feb 2023 08:05) (18 - 20)  SpO2: 81% (21 Feb 2023 08:05) (81% - 99%)    Parameters below as of 21 Feb 2023 08:05  Patient On (Oxygen Delivery Method): room air      I&O's Summary    20 Feb 2023 07:01  -  21 Feb 2023 07:00  --------------------------------------------------------  IN: 0 mL / OUT: 300 mL / NET: -300 mL        PHYSICAL EXAM:  GENERAL: NAD, thin pale elderly, fatigue, comfortable on nasal canula, lip lesion, dry blood  HEAD:  Atraumatic, Normocephalic  EYES: EOMI, PERRLA, conjunctiva and sclera clear  NECK: Supple, No JVD  CHEST/LUNG: mild decrease breath sounds bilaterally; No wheeze   HEART: Regular rate and rhythm; No murmurs, rubs, or gallops  ABDOMEN: Soft, Nontender, Nondistended; Bowel sounds present  Neuro: sleepy, appears comfortable  EXTREMITIES:  2+ Peripheral Pulses, No clubbing, cyanosis, trace b/l edema  SKIN: superficial ecchymoses.  lower lip hematoma/lip dry blood, no active bleeding

## 2023-02-21 NOTE — PROGRESS NOTE ADULT - PROBLEM SELECTOR PLAN 3
PRNs used q24hr (8a-8a): 2x Ativan 0.25 mg   -c/w Ativan 0.25 mg IV q1hr PRN for agitation     Monitor for constipation, urinary retention, pain, hunger, thirst, etc.  Promote sleep wake cycle and reorientation as indicated. PRNs used q24hr (8a-8a): 1x Dilaudid 0.5 mg for shortness of breath   -c/w Dilaudid 0.5 mg q1hr PRN for dyspnea  -Bowel regimen while on opiates

## 2023-02-21 NOTE — PROGRESS NOTE ADULT - ASSESSMENT
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 She presented here from [facility] for elevated WBC and low hemoglobin, severe weakness. Pt states for the past 2-3 days has been having increased weakness and lethargy, decreased appetite. +sputum productive cough. + cui, no sob, no difficulty breathing. No abdominal pain, nausea, vomiting, no bloody stools. Has endorsed multiple episodes of loose stools x weeks, currently being treated for c.diff with oral vancomycin.     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     Fatigue/dyspnea: due to severe anemia  - 2' MDS  - transfuse to keep Hgb above 7.0  - no melena, no hematochezia. no BRBPR. occult stool neg.   - she tends to require IV Lasix intermittently post transfusion.  - doubt GI bleed     Failure to thrive  - appears weak, fatigue  - requiring numerous plts and prbc transfusions  - CT chest worsening, ID / pulm/ rheum following  Lasix IV ordered since she received numerous transfusions. This does help with her nasal canula O2 requirement.   - acyclovir started for HSV1 lips  (family requesting to continue Acyclovir IV for comfort)    Diarrhea, multifactorial  - elevated WBC  - no documented c.diff PCR, re-ordered.  - s/p Vanco PO a few days (started in rehab)  - diarrhea completely resolved.   - monitor off PO vanco per ID  - now constipated. PRN bowel regimen started. +BM    Fever, resolved.   - RVP 1/30/23 (-)  - monitor blood cxs 1/30/23  - started empirically on cefepime 1/30/23, switched to merrem 1/31/23 --> 2/3/23  - enterrocus bacteremia, abx per ID. on IV Vanco, last day was 2/10/23  - Macrobid x 5 days course added for VRE in urine, last day was 2/8/23  - blood cultures now negative   - completed abx    AMS  - unclear etiology, likely metabolic encephalopathy from ?Cefepime? received 3 doses, last dose 1/31/23  - CT head neg. sugars stable  - close monitoring   - monitor glucose (glucose 65 on BMP, but 95 on fingersticks)  - encourage PO intake   - mental status improved and back to baseline    Pulmonary vasculitis   - Recent TTE on 11/3/22 reviewed: normal LV. outpt card Dr. Ady Cooper  - outpt pulm Mack Tucker   - s/p thoracoscopic biopsy of mediastinal lymph node and Lung wedge resection 11/10/22  - recent pleural effusion s/p 650 cc U/S-guided rt thoracentesis 11/17/22  - exudative but no neutrophilic predominance and initial Gram stain w/o organisms  - cultures neg.   - path results favoring vasculitis: no evidence for lymphoma. Cytology also came back negative.   - comfortable on nasal canula, better post diuretic last admission.   - rheum and heme-onc following previously, reconsulted  - last admission, she was not started on immunosuppressants such as cellcept given recent treatment for COVID19 at that time  - c/w prednisone 20 mg qdaily.   - RTX under consideration after administration of dacogen --> acute hepatitis panel (-) and quantiferon indeterminate  - ID started PCP ppx with Bactrim.     hx of Tachycardia    - cont low-dose metoprolol, 50 mg to 25 mg dose reduced in rehab.   - card Dr. Hooker    Anxiety and depression  - stable, continue citalopram and Remeron.  - Pt denied SI/HI ideations, denied visual and auditory hallucinations.     GERD (gastroesophageal reflux disease)  - continue PPI    Hyperlipidemia.   - continue home ezetimibe. okay to hold if nonformulary     Hyponatremia 127 (hypovolemic?)  - renal on the case, resolved.   - s/p 3 days of IV Lasix, now completed. electrolytes supplemented.  - O2 weaned off from ventimask to nascal canula to room air.     DVT ppx   - holding AC in the setting of anemia, pancytopenia.   - venodyne boots LEs

## 2023-02-21 NOTE — CHART NOTE - NSCHARTNOTESELECT_GEN_ALL_CORE
Nutrition Services
RHEUMATOLOGY/Event Note
Event Note
Nutrition Services
Nutrition Services
Platelets 8 today/Event Note
Rheumatology/Event Note
Rheumatology/Event Note
VRE urine/Event Note
hematology

## 2023-02-21 NOTE — PROGRESS NOTE ADULT - ASSESSMENT
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 Reports chronic cough, currently nonproductive - RVP/COVID negative. Has multiple episodes of loose stools x weeks, reported recently trying marijuana for appetite and noted worsening. She was given oral vancomycin empirically for C.diff but then became constipated, s/p bowel regimen and having normal bowel movements. Patient initially being monitored off antibiotics given she was afebrile, WBC was stable and no infectious source was found. She had a fever 100.9F on 1/30 overnight with worsening leukocytosis and then 101.4F overnight 1/31 and noted with confusion and found to have sepsis due to GP bacteremia.   H/o PCN allergy with hives    Sepsis due to gram positive bacteremia, cleared and treated    E. faecalis bacteremia - unclear source, ?GI, less likely   - 1/30 Bcx (1 set sent) with Staph epi - MRSE -- sensitivities noted   - 1/31 Bcx (1 set sent) - aerobic bottle grew E. faecalis (amp/vanc sensitive) and MRSE  - 2/1 repeat Bcx -- Negative x2   - TTE w/o mention of vegetations  - s/p vancomycin-completed 10d course 2/10    UTI with E. 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 5mg/kg Q8h with IVF if c/w GOC  continue supportive care     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   - CT showed s/p wedge resections in b/l upper lobes, 2 cm nodular opacity a/w staple line in RUL; multiple ill-defined b/l opacities more in RLL -unclear etiology, b/l effusions R>L   - continue on Bactrim DS 1 tablet daily for PCP ppx while on prednisone   - quantiferon indeterminate - pt on steroids which can cause indeterminate results; was negative Nov 2022  - repeat RVP 2/18 negative   - rheum note reviewed - family and patient deferred further treatment  Noted patient now transferred to PCU and focus on making patient comfortable      D/w daughter, Kathleen, at bedside  Yevgeniy Malave M.D.  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 Reports chronic cough, currently nonproductive - RVP/COVID negative. Has multiple episodes of loose stools x weeks, reported recently trying marijuana for appetite and noted worsening. She was given oral vancomycin empirically for C.diff but then became constipated, s/p bowel regimen and having normal bowel movements. Patient initially being monitored off antibiotics given she was afebrile, WBC was stable and no infectious source was found. She had a fever 100.9F on 1/30 overnight with worsening leukocytosis and then 101.4F overnight 1/31 and noted with confusion and found to have sepsis due to GP bacteremia.   H/o PCN allergy with hives    Sepsis due to gram positive bacteremia, cleared and treated    E. faecalis bacteremia - unclear source, ?GI, less likely   - 1/30 Bcx (1 set sent) with Staph epi - MRSE -- sensitivities noted   - 1/31 Bcx (1 set sent) - aerobic bottle grew E. faecalis (amp/vanc sensitive) and MRSE  - 2/1 repeat Bcx -- Negative x2   - TTE w/o mention of vegetations  - s/p vancomycin-completed 10d course 2/10    UTI with E. 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 5mg/kg Q8h with IVF if c/w GOC  continue supportive care     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   - CT showed s/p wedge resections in b/l upper lobes, 2 cm nodular opacity a/w staple line in RUL; multiple ill-defined b/l opacities more in RLL -unclear etiology, b/l effusions R>L   - continue on Bactrim DS 1 tablet daily for PCP ppx while on prednisone   - quantiferon indeterminate - pt on steroids which can cause indeterminate results; was negative Nov 2022  - repeat RVP 2/18 negative   - fungitell negative (60)  - rheum note reviewed - family and patient deferred further treatment  Noted patient now transferred to PCU and focus on making patient comfortable      D/w daughter, Kathleen, at bedside  Yevgeniy Malave M.D.  [facility], Division of Infectious Diseases  [phone]  After 5pm on weekdays and all day on weekends - please call [phone]

## 2023-02-21 NOTE — PROVIDER CONTACT NOTE (OTHER) - SITUATION
Pt refusing to get blood drawn.
Pt refused labs to be drawn and refused for her IV to be changed
Pt refusing AM blood draws
Pt/ family refusing morning labs, vitals and medications. Pt and family education on importance of labs, vitals, and medications, pt/family still refusing.
Patient with no respirations or apical pulse x1 minute
Pt refused oral medications and vital signs throughout day
RN missed vanco trough
Pt left chest wall port leaking and dressing filled with blood. Left chest wall port changed by certified RN prior during shift.
Pt refusing bowel regimen
Pt tachycardic at 120's beats per minute

## 2023-02-21 NOTE — PROGRESS NOTE ADULT - ATTENDING COMMENTS
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  Patient admitted from [facility] for [facility]TT.  Family has opted to forgo disease directed therapy in lieu of comfort care given poor quality of life and pt wishes when she was able to state.  Patient transferred to PCU for management of symptoms and end of life care.  In the setting of parenteral controlled substance administration, clinical monitoring required for side effects such as respiratory depression, constipation and opioid induced neurotoxicity.  This patient is at [x ]high [ ] medium [ ] low risk due to multiple co-morbid conditions as above.      [ ] The patient is receiving IV and has required  escalation for uncontrolled symptoms.  [ ] To taper and monitor for emergence of symptoms.  [ x] Symptoms controlled with present regimen.     Hospice transition to be addressed if clinical condition permits.   Patient assessment and plan discussed on interdisciplinary team rounds today.   Family at bedside,  educated as to what to expect.  Questions answered.  Emotional support provided.

## 2023-02-21 NOTE — PROGRESS NOTE ADULT - SUBJECTIVE AND OBJECTIVE BOX
[facility] DIVISION OF INFECTIOUS DISEASES  ANDREW Rodríguez Y. Patel, S. Shah, G. Casimir  [phone]  ([phone] - weekdays after 5pm and weekends)    Name: BETTIE NGUYEN  Age/Gender: 82y Female  MRN: [id number]    Interval History:  Patient seen and examined this morning.   Resting comfortably, unable to obtain ROS.  Transferred to PCU. Notes reviewed  Daughter, Kathleen, at bedside.     Allergies: codeine (Nausea)  doxycycline (Nausea)  penicillin (Hives)    Objective:  Vitals:   T(F): 98.1 (02-21-23 @ 08:05), Max: 98.1 (02-21-23 @ 08:05)  HR: 126 (02-21-23 @ 08:05) (121 - 126)  BP: 94/56 (02-21-23 @ 08:05) (94/56 - 121/54)  RR: 20 (02-21-23 @ 08:05) (18 - 20)  SpO2: 81% (02-21-23 @ 08:05) (81% - 99%)  Physical Examination:  General: no acute distress  HEENT: NC/AT, lip with dried bloody lesions, neck supple  Respiratory: no acc muscle use, breathing comfortably  Cardiovascular: S1 and S2 present  Gastrointestinal: normal appearing, nondistended  Extremities: no edema, no cyanosis  Skin: no visible rash    Laboratory Studies:  CBC:   WBC Trend:  22.30 02-18-23 @ 10:18  19.05 02-18-23 @ 07:09  28.99 02-17-23 @ 17:47  28.48 02-17-23 @ 12:25  29.33 02-17-23 @ 07:40  30.15 02-17-23 @ 07:14  35.77 02-16-23 @ 17:54  31.79 02-16-23 @ 08:10  33.45 02-16-23 @ 04:14  42.40 02-15-23 @ 07:42    CMP:   Creatinine, Serum: 0.58 mg/dL (02-18-23 @ 07:06)  Creatinine, Serum: 0.72 mg/dL (02-17-23 @ 06:34)  Creatinine, Serum: 0.53 mg/dL (02-16-23 @ 04:09)  Creatinine, Serum: 0.65 mg/dL (02-15-23 @ 07:42)    Microbiology: reviewed   Culture - Blood (collected 02-12-23 @ 17:54)  Source: .Blood Blood-Peripheral  Final Report (02-17-23 @ 22:00):    No Growth Final    Culture - Blood (collected 02-12-23 @ 17:26)  Source: .Blood Blood-Peripheral  Final Report (02-17-23 @ 22:00):    No Growth Final    Radiology: reviewed     Medications:  acetaminophen   IVPB .. 1000 milliGRAM(s) IV Intermittent once PRN  acyclovir IVPB 260 milliGRAM(s) IV Intermittent every 8 hours  chlorhexidine 2% Cloths 1 Application(s) Topical daily  HYDROmorphone  Injectable 0.5 milliGRAM(s) IV Push every 1 hour PRN  HYDROmorphone  Injectable 0.2 milliGRAM(s) IV Push every 1 hour PRN  HYDROmorphone  Injectable 0.5 milliGRAM(s) IV Push every 6 hours  HYDROmorphone  Injectable 0.5 milliGRAM(s) IV Push every 1 hour PRN  LORazepam   Injectable 0.25 milliGRAM(s) IV Push every 1 hour PRN  nystatin Powder 1 Application(s) Topical two times a day PRN  ondansetron Injectable 4 milliGRAM(s) IV Push every 8 hours PRN  zinc oxide 40% Paste 1 Application(s) Topical two times a day PRN    Current Antimicrobials:  acyclovir IVPB 260 milliGRAM(s) IV Intermittent every 8 hours    Prior/Completed Antimicrobials:  cefepime   IVPB  nitrofurantoin monohydrate/macrocrystals (MACROBID)  vancomycin  IVPB

## 2023-03-09 NOTE — PROGRESS NOTE ADULT - ASSESSMENT
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 Patient states that recently she has been feeling very fatigued. She states that moving around her house, or even to the bathroom, has been causing her to get very tired and causes her some minor chest pain, which resolves quickly with rest. She states that her breathing has been non-labored, denies dyspnea, shortness of breath, inability to catch her breath. She states that she has also not had much of an appetite over the last month and has been eating mostly soft foods. She admits to about a 5lb weight loss over the last month. She admits to a minor cough that occasionally occurs in fits and makes her feel as if she may vomit. Patient admits to some nausea and aversion to food but denies mechanical issues, inability to eat or feeling of choking. Denies hematemesis, hemoptysis.   (09 Nov 2022 12:31)      Hyponatremia  resolved  Likely SIADH  free water restriction <1L/day  improved  monitor    Hypophosphatemia  supplement as needed  Check phosphorus daily.     Anemia  Defer to hematology               hypocalcemia  sec to low alb  vit d 1,25 ok  Monitor    O-Z Plasty Text: The defect edges were debeveled with a #15 scalpel blade.  Given the location of the defect, shape of the defect and the proximity to free margins an O-Z plasty (double transposition flap) was deemed most appropriate.  Using a sterile surgical marker, the appropriate transposition flaps were drawn incorporating the defect and placing the expected incisions within the relaxed skin tension lines where possible.    The area thus outlined was incised deep to adipose tissue with a #15 scalpel blade.  The skin margins were undermined to an appropriate distance in all directions utilizing iris scissors.  Hemostasis was achieved with electrocautery.  The flaps were then transposed into place, one clockwise and the other counterclockwise, and anchored with interrupted buried subcutaneous sutures.

## 2023-05-02 NOTE — PROGRESS NOTE ADULT - SUBJECTIVE AND OBJECTIVE BOX
Date of Service: 11-29-22 @ 16:13    Patient is a 81y old  Female who presents with a chief complaint of RVATS, RUL Nodule Biopsy w/ IR marking (29 Nov 2022 14:21)      Any change in ROS: he seems OK :no sob:  on 2 L     MEDICATIONS  (STANDING):  acetaminophen   IVPB .. 750 milliGRAM(s) IV Intermittent once  albuterol    90 MICROgram(s) HFA Inhaler 2 Puff(s) Inhalation every 6 hours  citalopram 10 milliGRAM(s) Oral daily  dexAMETHasone  Injectable 6 milliGRAM(s) IV Push daily  enoxaparin Injectable 40 milliGRAM(s) SubCutaneous every 24 hours  fluticasone propionate 50 MICROgram(s)/spray Nasal Spray 1 Spray(s) Both Nostrils two times a day  folic acid 1 milliGRAM(s) Oral daily  furosemide   Injectable 20 milliGRAM(s) IV Push once  guaiFENesin  milliGRAM(s) Oral every 12 hours  ipratropium 17 MICROgram(s) HFA Inhaler 2 Puff(s) Inhalation every 6 hours  levalbuterol 45 MICROgram(s) HFA Inhaler 2 Puff(s) Inhalation every 6 hours  lidocaine   4% Patch 1 Patch Transdermal daily  melatonin 3 milliGRAM(s) Oral at bedtime  metoprolol tartrate 50 milliGRAM(s) Oral two times a day  pantoprazole    Tablet 40 milliGRAM(s) Oral before breakfast  polyethylene glycol 3350 17 Gram(s) Oral daily  senna 1 Tablet(s) Oral daily  sodium bicarbonate 650 milliGRAM(s) Oral three times a day    MEDICATIONS  (PRN):  acetaminophen     Tablet .. 650 milliGRAM(s) Oral every 6 hours PRN Mild Pain (1 - 3)  benzonatate 200 milliGRAM(s) Oral three times a day PRN Cough  naloxone Injectable 0.1 milliGRAM(s) IV Push every 3 minutes PRN For ANY of the following changes in patient status:  A. RR LESS THAN 10 breaths per minute, B. Oxygen saturation LESS THAN 90%, C. Sedation score of 6  ondansetron Injectable 4 milliGRAM(s) IV Push every 6 hours PRN Nausea    Vital Signs Last 24 Hrs  T(C): 36.4 (29 Nov 2022 12:40), Max: 37.2 (28 Nov 2022 21:30)  T(F): 97.5 (29 Nov 2022 12:40), Max: 98.9 (28 Nov 2022 21:30)  HR: 83 (29 Nov 2022 12:40) (73 - 87)  BP: 143/61 (29 Nov 2022 12:40) (123/53 - 151/55)  BP(mean): --  RR: 20 (29 Nov 2022 13:00) (17 - 20)  SpO2: 79% (29 Nov 2022 13:00) (79% - 99%)    Parameters below as of 29 Nov 2022 13:00  Patient On (Oxygen Delivery Method): nasal cannula  O2 Flow (L/min): 1      I&O's Summary        Physical Exam:   GENERAL: NAD, well-groomed, well-developed  HEENT: RANJIT/   Atraumatic, Normocephalic  ENMT: No tonsillar erythema, exudates, or enlargement; Moist mucous membranes, Good dentition, No lesions  NECK: Supple, No JVD, Normal thyroid  CHEST/LUNG:minmal wheezing+   CVS: Regular rate and rhythm; No murmurs, rubs, or gallops  GI: : Soft, Nontender, Nondistended; Bowel sounds present  NERVOUS SYSTEM:  Alert & Oriented X3  EXTREMITIES:  2+ Peripheral Pulses, No clubbing, cyanosis, or edema  LYMPH: No lymphadenopathy noted  SKIN: No rashes or lesions  ENDOCRINOLOGY: No Thyromegaly  PSYCH: Appropriate    Labs:  22                            8.7    10.72 )-----------( 316      ( 29 Nov 2022 06:00 )             29.1                         8.8    9.93  )-----------( 288      ( 28 Nov 2022 06:09 )             28.9                         8.8    5.50  )-----------( 203      ( 27 Nov 2022 06:54 )             28.9                         9.0    8.53  )-----------( 274      ( 26 Nov 2022 06:56 )             28.9     11-29    140  |  104  |  23  ----------------------------<  98  3.9   |  26  |  0.49<L>  11-28    140  |  105  |  23  ----------------------------<  137<H>  4.0   |  24  |  0.52  11-27    144  |  107  |  19  ----------------------------<  166<H>  4.2   |  25  |  0.48<L>  11-26    137  |  105  |  14  ----------------------------<  133<H>  4.1   |  21<L>  |  0.47<L>    Ca    8.3<L>      29 Nov 2022 06:00  Ca    8.4      28 Nov 2022 06:09  Phos  3.0     11-29  Phos  2.8     11-28  Mg     2.10     11-29  Mg     2.10     11-28    TPro  6.0  /  Alb  2.8<L>  /  TBili  0.5  /  DBili  x   /  AST  12  /  ALT  <5<L>  /  AlkPhos  55  11-29  TPro  6.0  /  Alb  2.6<L>  /  TBili  0.5  /  DBili  x   /  AST  8   /  ALT  <5  /  AlkPhos  57  11-28  TPro  5.8<L>  /  Alb  2.5<L>  /  TBili  0.6  /  DBili  x   /  AST  11  /  ALT  <5<L>  /  AlkPhos  55  11-27  TPro  5.9<L>  /  Alb  2.4<L>  /  TBili  0.7  /  DBili  x   /  AST  18  /  ALT  5   /  AlkPhos  64  11-26    CAPILLARY BLOOD GLUCOSE          LIVER FUNCTIONS - ( 29 Nov 2022 06:00 )  Alb: 2.8 g/dL / Pro: 6.0 g/dL / ALK PHOS: 55 U/L / ALT: <5 U/L / AST: 12 U/L / GGT: x               D-Dimer Assay, Quantitative: 1111 ng/mL DDU (11-29 @ 06:00)  D-Dimer Assay, Quantitative: 2028 ng/mL DDU (11-28 @ 06:09)  D-Dimer Assay, Quantitative: 1867 ng/mL DDU (11-27 @ 06:54)  D-Dimer Assay, Quantitative: 2792 ng/mL DDU (11-25 @ 06:00)  D-Dimer Assay, Quantitative: 2286 ng/mL DDU (11-23 @ 06:07)  Procalcitonin, Serum: 0.12 ng/mL (11-28 @ 06:09)        RECENT CULTURES:        RESPIRATORY CULTURES:          Studies  Chest X-RAY  CT SCAN Chest   Venous Dopplers: LE:   CT Abdomen  Others  rad< from: Xray Chest 1 View- PORTABLE-Routine (Xray Chest 1 View- PORTABLE-Routine .) (11.26.22 @ 18:48) >        INTERPRETATION:  Chest one view    HISTORY: Covid-19    COMPARISON STUDY: 11/22/2022    Frontal expiratory view of the chest shows the heart to be similar in   size. Left chest port is again noted.    The lungs show similar patchy infiltrates with small pleural effusions   and there is no evidence of pneumothorax.    IMPRESSION:  Similar patchy infiltrates.        Thank you for the courtesy of this referral.    --- End of Report ---            ALEXA ZABALA MD; Attending Interventional Radiologist  This document has been electronically signed. Nov 27 2022  3:59PM    < end of copied text >               c/w solu-medrol 40mg q8  On home breztri, Spiriva, albuterol  Continue duonebs q6 ATC and albuterol nebulizations q3 PRN  Start symbicort BID  Continue supplemental O2   CT chest reviewed - no PNA  Monitor off antibiotics, f/u cultures, RVP negative  Pulm Dr. Dobson consulted

## 2023-09-02 NOTE — H&P PST ADULT - NEGATIVE RESPIRATORY AND THORAX SYMPTOMS
Please massage the left face and apply heat pads as tolerated  Please start Augmentin prescription tonight  Follow-up with ENT specialist as we discussed, number is provided  Please take Tylenol 500 mg every 6 hours as needed for pain  Patient return if worse or new symptoms no wheezing

## 2023-09-26 NOTE — PHYSICAL THERAPY INITIAL EVALUATION ADULT - TRANSFER SKILLS, REHAB EVAL
LAST OV WAS ON 10/4/22, ASSESSMENT/ PLAN:    Paroxysmal ventricular tachycardia s/p RVO s/p RFA 3/14/2014   HTN   Hyperlipidemia   Hypothyroidism       We reviewed her recent testing including the echocardiogram.  She has not had any palpitations or chest pains.  She remains active with no symptoms otherwise.  She is compliant with her medications.  She reports that her blood pressure at home is typically well controlled.  The blood pressure today in office is slightly high, her blood pressure will be rechecked before she leaves office today.  Of note the echo in October 2021 noted that she has an aneurysmal atrial septum with no evidence of shunting by color Doppler, she already takes aspirin 325 mg daily.     I will follow-up with her in about a year or sooner if there are issues.      INTERVAL PROGRESS SINCE LAST VISIT:  N/A    CARDIAC RELATED HOSPITAL/ ER VISIT SINCE LAST OV:   N/A    REFERRALS TO OTHER SPECIALTIES:  N/A    UPCOMING PROCEDURES:   N/A    ANTICOAGULATION: This patient does not have an active medication from one of the medication groupers.     IMPLANTABLE CARDIAC DEVICES:    N/A    LAST DEVICE INTERROGATION:   N/A    DATA REVIEWED:    10/19/21 ECHO:  1. Moderate left atrial enlargement.  2. There is an atrial septal aneurysm noted. By color Doppler there is no evidence of shunt physiology. A bubble study was not  performed.  3. Normal RV size, thickness, contractility. The RV outflow tract is not well visualized  4. Normal LV size, thickness, and contractility  5. The aortic, mitral, and tricuspid valves are morphologically normal. There are no hemodynamically significant valvular lesions  noted.     1/15/16 Event Monitor- Multiple reported symptoms of palpitations/rapid heart rate that mostly correlated with sinus rhythm with isolated PVCs or PACs and occasionally sinus tachycardia. No other arrhythmias were noted with the symptoms     3/14/14 EP Study, 3D Mapping, Radiofrequency Ablation by   Hai     2/4/14 Event monitor: IMPRESSION:   Frequent premature ventricular contractions, ventricular couplets   and short runs of nonsustained ventricular tachycardia, 3-5 beats   in duration.  These are associated with the patient's symptoms of   dizziness, palpitations and fluttering in her chest.     12/29/13 STRESS- Low risk myocardial perfusion study. Overall LV systolic function is normal with an ejection fraction of 65% and no wall motion abnormalities     12/28/13 ECHO- Technically difficult study. Poor aucoustic windows. Normal left ventricular size and systolic function. LVEF 55%. No regional wall motion abnormalities.Indeterminate diastolic function. Indeterminate diastolic function. Trace to mild tricuspid valve regurgitation. Patient had frequent runs of NSVT     independent

## 2023-10-19 NOTE — BRIEF OPERATIVE NOTE - NSICDXBRIEFPROCEDURE_GEN_ALL_CORE_FT
PROCEDURES:  Flexible bronchoscopy by cardiothoracic surgery 16-Apr-2021 16:37:15  Guillermo Joy  Wedge resection of left lung with video-assisted thoracoscopic surgery (VATS) 16-Apr-2021 16:37:26  Guillermo Joy   Itraconazole Counseling:  I discussed with the patient the risks of itraconazole including but not limited to liver damage, nausea/vomiting, neuropathy, and severe allergy.  The patient understands that this medication is best absorbed when taken with acidic beverages such as non-diet cola or ginger ale.  The patient understands that monitoring is required including baseline LFTs and repeat LFTs at intervals.  The patient understands that they are to contact us or the primary physician if concerning signs are noted.

## 2023-11-06 NOTE — DISCHARGE NOTE PROVIDER - NSDCMRMEDTOKEN_GEN_ALL_CORE_FT
J43.2  [address] [facility] coding opportunities          Chart Reviewed number of suggestions sent to Provider: 1     Patients Insurance     Medicare Insurance: [facility] [facility] citalopram 10 mg oral tablet: 1 tab(s) orally once a day  Eliquis 5 mg oral tablet: 1 tab(s) orally 2 times a day MDD:2 tabs  ezetimibe 10 mg oral tablet: 1 tab(s) orally once a day  omeprazole 20 mg oral delayed release capsule: 1 cap(s) orally once a day   acetaminophen 325 mg oral tablet: 1 tab(s) orally every 6 hours, As needed, Mild Pain (1 - 3)  citalopram 10 mg oral tablet: 1 tab(s) orally once a day  Eliquis 5 mg oral tablet: 1 tab(s) orally 2 times a day MDD:2 tabs  ezetimibe 10 mg oral tablet: 1 tab(s) orally once a day  omeprazole 20 mg oral delayed release capsule: 1 cap(s) orally once a day  Reglan 10 mg oral tablet: 1 tab(s) orally every 8 hours, As Needed for nausea/vomiting  valACYclovir 1 g oral tablet: 1 tab(s) orally 2 times a day

## 2023-11-14 NOTE — PROGRESS NOTE ADULT - ASSESSMENT
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 rehab for 2-3 days of lethargy and weakness with productive cough, found to have anemia, thrombocytopenia and leukocytosis. Hematology consulted for further work up.      Today's lab WBC 54.6, Hb 7.6, PLT 30K  Will plan for Decitabine 20mg/m2 daily for 5 days.       # New diagnosis MDS with EB2    - CBC at admission with anemia of 5.9; platelet count of 68, wbc of 45.18. Review of records, patient with anemia since at least october 2022, however thrombocytopenia and leukocytosis is new.    - Received pRBC transfusion and responded appropriately, platelets downtrending ~30k   - Iron panel consistent AOCD; Ferritin elevated at 5768; B12 elevated, folate>20    - CT C/A/P with contrast without signs of LAD however, Multiple ill-defined opacities are noted within both lungs, more so in the right lower lobe. Exact etiology is unclear. B/L pleural effusions R>L   - RVP/covid negative,    - peripheral smear reviewed by hematology team during the initial consultation after this admission: no blasts, but immature WBCs noted, no schistocytes, thrombocytopenia noted    - Bone marrow biopsy 1/23/2023; Pathology consistent with MDS- EB 10-15% blasts .   - Hematology team discussed with her hematologist Dr. Madrigal at [facility]: plan is for single agent HMA for now; once follows up post rehab, can consider add on Dave. Rheumatology still considering rituximab; the initial Plan was to give Rituximab then HMA (azacytidine vs dacogen) once ID clears.   -hematology team communicated with Dr. Madrigal and recommended starting HMA Dacogen (decitabine) x 5 days; planning to start Day 1 on 2/10/23.  -Chemo consent signed by pt and put in the chart. hematology team called pt's son at [facility]  [phone] to discuss the situation, and her son understands and agrees with the treatment plan.   - bacteremia found on 1/31 blood culx (Staph epidermitis with methicillin resistance)  s/p meropenem and Vanco; now on nitrofurantion until 2/9   -left side Epistaxis found and ENT f/u, nostril pack removed 2/4 per note ; transfusions for plts <10 if afebrile, <15 if febrile, 50 if bleeding, hbg <7: will receive 1u Plt today 2/9 in view of platelet 10     - Please check daily CBC with DIFF and CMP      # Hx of DLBCL EBV+   - Follows with Dr Madrigal, s/p R-mini-chop in 2021; Recent bone marrow biopsy for new anemia in Nov /2022 did not show any disease recurrence.    - repeat imaging this admission without signs of LAD   -Will start Decitabine 20mg/m2 today (2/10/23) daily for 5 days  -Trend TLS q24hrs (UA, MG, PH, LDH)  -If UA>5 and less 8 can start allopurinol 300mg daily   -If UA>8 can give rasburicase 3mg once +IVF        # ANCA vasculitis:    - Followed by rheum    - On chronic steroids - 20mg prednisone; on bactrim DS 1 tab daily for ppx Outreach performed. Left voicemail for patient to call back regarding cosmetic quote.

## 2024-02-22 NOTE — ED PROVIDER NOTE - IV ALTEPLASE INCLUSION HIDDEN
Nerve Block    Date/Time: 2/22/2024 4:20 PM    Performed by: Marialuisa Leo DO  Authorized by: Marialuisa Leo DO    Block Type: femoral adductor canal  Laterality:  Right  Patient Location:  OR  Indication: post-op pain management at surgeon's request    Preanesthetic Checklist Patient Identified (2 criteria), Block Plan Confirmed, Resuscitation Equipment Available, Supplemental O2 (if needed), Allergies Confirmed, Block Site Marked (if applicable), Monitors Applied, Aseptic Technique, Coagulation Status, Necessary Block Equipment Present, Timeout Performed, IV Access Functioning, Consent Verified, Drugs/Solutions Labeled and Sedation Given (if needed)    Patient Position:  Supine  Prep:  Chlorhexidine gluconate (CHG)  Max Sterile Barrier Technique:  Hand washing, cap/mask, sterile gloves and sterile gel  Monitoring:  Blood pressure, continuous pulse oximetry, EKG and heart rate  Injection Technique:  Single-shot  Procedures: ultrasound guided and ultrasound permanent image saved    Needle Type:  Echogenic  Needle Gauge:  20 G  Needle Length:  4 in  Needle Localization:  Ultrasound guidance and anatomical landmarks   in-plane  Physical status during block:   Medications Administered  ropivacaine (NAROPIN) 0.5 % - Infiltration   20 mL - 2/22/2024 4:20:00 PM  Injection Assessment:  Negative aspiration for heme, no paresthesia on injection, incremental injection, local visualized surrounding nerve on ultrasound and no resistance to injection  Patient Condition:  Tolerated well, no immediate complications  Paresthesia Pain:  None  Heart Rate Change: No    Slowly Injected: Yes    Start Time:2/22/2024 4:20 PM  Stop Time: 2/22/2024 4:22 PM       show

## 2024-04-27 NOTE — PHYSICAL THERAPY INITIAL EVALUATION ADULT - PLANNED THERAPY INTERVENTIONS, PT EVAL
Patient left sitting in chair, in NAD, call bell in reach, all lines intact. Adriel RN at bedside./gait training/strengthening/transfer training Yes

## 2024-06-20 NOTE — H&P PST ADULT - NEGATIVE ALLERGY TYPES
Physical Therapy Discharge Summary    Reason for therapy discharge:    Discharged to home with home therapy.    Progress towards therapy goal(s). See goals on Care Plan in [facility] health record for goal details.  Goals not met.  Barriers to achieving goals:   discharge from facility.    Therapy recommendation(s):    Continued therapy is recommended.  Rationale/Recommendations:  TCU recommended but pt elected for home with assist of spouse and HHPT to progress strength and IND mobility.       no outdoor environmental allergies/no indoor environmental allergies
